# Patient Record
Sex: FEMALE | Race: WHITE | NOT HISPANIC OR LATINO | Employment: OTHER | URBAN - METROPOLITAN AREA
[De-identification: names, ages, dates, MRNs, and addresses within clinical notes are randomized per-mention and may not be internally consistent; named-entity substitution may affect disease eponyms.]

---

## 2019-04-10 ENCOUNTER — TELEPHONE (OUTPATIENT)
Dept: SCHEDULING | Facility: CLINIC | Age: 74
End: 2019-04-10

## 2019-04-17 ENCOUNTER — TELEPHONE (OUTPATIENT)
Dept: CARDIOLOGY | Facility: CLINIC | Age: 74
End: 2019-04-17

## 2019-04-17 NOTE — TELEPHONE ENCOUNTER
Left message on both phone numbers to change time for appt on Friday 4/19 from 1pm to 8:30am per . Thank you

## 2019-04-19 ENCOUNTER — OFFICE VISIT (OUTPATIENT)
Dept: CARDIOLOGY | Facility: CLINIC | Age: 74
End: 2019-04-19
Payer: MEDICARE

## 2019-04-19 ENCOUNTER — TELEPHONE (OUTPATIENT)
Dept: SCHEDULING | Facility: CLINIC | Age: 74
End: 2019-04-19

## 2019-04-19 ENCOUNTER — TELEPHONE (OUTPATIENT)
Dept: TRANSFER UNIT | Age: 74
End: 2019-04-19

## 2019-04-19 VITALS
HEIGHT: 67 IN | RESPIRATION RATE: 15 BRPM | SYSTOLIC BLOOD PRESSURE: 130 MMHG | BODY MASS INDEX: 24.48 KG/M2 | DIASTOLIC BLOOD PRESSURE: 80 MMHG | WEIGHT: 156 LBS | HEART RATE: 88 BPM

## 2019-04-19 DIAGNOSIS — E78.00 HYPERCHOLESTEROLEMIA: ICD-10-CM

## 2019-04-19 DIAGNOSIS — R07.89 CHEST TIGHTNESS: Primary | ICD-10-CM

## 2019-04-19 DIAGNOSIS — I73.00 RAYNAUD'S DISEASE WITHOUT GANGRENE: ICD-10-CM

## 2019-04-19 DIAGNOSIS — R03.0 ELEVATED BLOOD PRESSURE READING WITHOUT DIAGNOSIS OF HYPERTENSION: ICD-10-CM

## 2019-04-19 DIAGNOSIS — Z96.641 HISTORY OF HIP REPLACEMENT, TOTAL, RIGHT: ICD-10-CM

## 2019-04-19 DIAGNOSIS — Z86.79 HISTORY OF RHEUMATIC FEVER AS A CHILD: ICD-10-CM

## 2019-04-19 DIAGNOSIS — I71.40 ABDOMINAL AORTIC ANEURYSM (AAA) WITHOUT RUPTURE (CMS/HCC): ICD-10-CM

## 2019-04-19 PROBLEM — H40.9 GLAUCOMA: Status: ACTIVE | Noted: 2019-04-19

## 2019-04-19 PROBLEM — K21.9 GERD (GASTROESOPHAGEAL REFLUX DISEASE): Status: ACTIVE | Noted: 2019-04-19

## 2019-04-19 PROBLEM — G90.50 RSD (REFLEX SYMPATHETIC DYSTROPHY): Status: ACTIVE | Noted: 2019-04-19

## 2019-04-19 PROBLEM — M15.9 OSTEOARTHRITIS OF MULTIPLE JOINTS: Status: ACTIVE | Noted: 2019-04-19

## 2019-04-19 PROBLEM — M48.062 PSEUDOCLAUDICATION SYNDROME: Status: ACTIVE | Noted: 2019-04-19

## 2019-04-19 PROBLEM — E78.5 HYPERLIPIDEMIA: Status: ACTIVE | Noted: 2019-04-19

## 2019-04-19 PROBLEM — I00 RHEUMATIC FEVER: Status: ACTIVE | Noted: 2019-04-19

## 2019-04-19 PROCEDURE — 93000 ELECTROCARDIOGRAM COMPLETE: CPT | Performed by: INTERNAL MEDICINE

## 2019-04-19 PROCEDURE — 99205 OFFICE O/P NEW HI 60 MIN: CPT | Performed by: INTERNAL MEDICINE

## 2019-04-19 RX ORDER — PANTOPRAZOLE SODIUM 40 MG/1
40 TABLET, DELAYED RELEASE ORAL DAILY
COMMUNITY
End: 2023-07-14 | Stop reason: HOSPADM

## 2019-04-19 RX ORDER — NAPROXEN SODIUM 220 MG
440 TABLET ORAL DAILY PRN
COMMUNITY
End: 2019-05-03 | Stop reason: HOSPADM

## 2019-04-19 RX ORDER — AMOXICILLIN 500 MG/1
2000 TABLET, FILM COATED ORAL
Status: ON HOLD | COMMUNITY
Start: 2017-10-16 | End: 2019-05-03 | Stop reason: ENTERED-IN-ERROR

## 2019-04-19 RX ORDER — NITROGLYCERIN 0.4 MG/1
0.4 TABLET SUBLINGUAL EVERY 5 MIN PRN
Qty: 25 TABLET | Refills: 4 | Status: SHIPPED | OUTPATIENT
Start: 2019-04-19 | End: 2023-07-14 | Stop reason: HOSPADM

## 2019-04-19 RX ORDER — METOPROLOL SUCCINATE 50 MG/1
50 TABLET, EXTENDED RELEASE ORAL DAILY
Qty: 90 TABLET | Refills: 3 | Status: ON HOLD | OUTPATIENT
Start: 2019-04-19 | End: 2019-05-03

## 2019-04-19 RX ORDER — ROSUVASTATIN CALCIUM 5 MG/1
5 TABLET, COATED ORAL DAILY
Qty: 90 TABLET | Refills: 4 | Status: ON HOLD | OUTPATIENT
Start: 2019-04-19 | End: 2019-05-03

## 2019-04-19 RX ORDER — ASPIRIN 81 MG/1
81 TABLET ORAL DAILY
Qty: 90 TABLET | Refills: 5 | COMMUNITY
Start: 2019-04-19 | End: 2020-07-10 | Stop reason: ALTCHOICE

## 2019-04-19 ASSESSMENT — ENCOUNTER SYMPTOMS
ADENOPATHY: 1
NUMBNESS: 1
EYE DISCHARGE: 0
NERVOUS/ANXIOUS: 1
EYE PAIN: 0
FLANK PAIN: 0
FEVER: 0
NECK PAIN: 1
BACK PAIN: 1
COUGH: 1
POLYPHAGIA: 0
FACIAL ASYMMETRY: 0
ARTHRALGIAS: 1
FATIGUE: 1
POLYDIPSIA: 0
WEAKNESS: 0
MYALGIAS: 1
APNEA: 0
TROUBLE SWALLOWING: 1
SPEECH DIFFICULTY: 0
LIGHT-HEADEDNESS: 0
DIZZINESS: 1
ABDOMINAL PAIN: 0
PALPITATIONS: 1
ABDOMINAL DISTENTION: 0
BLOOD IN STOOL: 0
SEIZURES: 1
CHILLS: 0
EYE ITCHING: 1
BRUISES/BLEEDS EASILY: 1
SLEEP DISTURBANCE: 1
DIFFICULTY URINATING: 0
WOUND: 0
SHORTNESS OF BREATH: 1
COLOR CHANGE: 0
ANAL BLEEDING: 0
HEMATURIA: 0
WHEEZING: 0

## 2019-04-19 NOTE — LETTER
Tomasz Courtney MD, [facility]    Director, [facility]  [facility]    The [employer] Chair in Innovative Cardiology    Clinical Associate Professor of Medicine  [facility]  [facility]  [facility] [address]  [address]  [place] [address]    TEL  [phone]  FAX: [phone]  EMAIL: [email or web address]         4/19/2019    Anitra Way MD  [address] [place] [address]      Jennifer Sams  1945    Dear Dr. Way,      I saw Jennifer Sams in my office today on 4/19/2019 to provide an opinion as to the assessment and management of exertional chest tightness and shortness of breath.      She is a 74 y.o. woman with a history of osteoarthritis of her hips status post multiple surgical procedures echo experiencing intractable hip pain and a history of hypercholesterolemia with a high HDL cholesterol and multiple drug intolerances.    A few months ago she began to notice the development of intermittent tightness across her upper chest and shortness of breath when climbing stairs, which is currently her most demanding physical activity.  The discomfort shortness of breath improved with resting for a few minutes.  At times she is capable of climbing of stairs without symptoms and then other times with just shortness of breath.  The symptoms have not increased in frequency or intensity since they began.  They are not associated with nausea, vomiting, diaphoresis and do not radiate.    Her most recent lipid profile (3/9/19) revealed the following:  Cholesterol 258, triglyceride 127, HDL 76, , non-.  She is taking red yeast rice but is not on a prescribed statin due to concerns about side effects.  She had developed GI side effects from atorvastatin and other statins but does not recall having been placed on rosuvastatin  at any time.  Her side effects are not associated with myalgias or muscle cramping but rather GI in nature.    She was found to have a mildly dilated abdominal aorta (2.4 x 2.5 cm) noted incidentally when MRI studies were performed to reevaluate her hips.    She is in chronic pain due to her hips which impairs her ability to sleep at night.  She reports being chronically fatigued.    There is no history of stroke or claudication.  She reports a childhood history of rheumatic fever but has never been told that there has been a significant heart murmur.    Social History   Substance Use Topics   • Smoking status: Never Smoker   • Smokeless tobacco: Never Used   • Alcohol use Yes      Comment: VERY RARE        Past Medical History:   Diagnosis Date   • AAA (abdominal aortic aneurysm) (CMS/HCC) ([facility])    • Arthritis    • Elevated blood pressure reading without diagnosis of hypertension 4/19/2019   • GERD (gastroesophageal reflux disease) 4/19/2019   • Glaucoma 4/19/2019   • Heart murmur    • Hiatal hernia    • History of hip replacement, total, right 4/19/2019    Right hip 9/ 2016 and revision 2017    • History of rheumatic fever as a child 4/19/2019   • Hypercholesterolemia 4/19/2019   • Osteoarthritis of multiple joints 4/19/2019   • Osteoporosis    • Raynaud's disease 4/19/2019   • RSD (reflex sympathetic dystrophy) 4/19/2019    Of left foot       Past Surgical History:   Procedure Laterality Date   • CHOLECYSTECTOMY OPEN     • DILATION AND CURETTAGE OF UTERUS     • EYE SURGERY      RIGHT CATARACT   • FOOT SURGERY Left    • JOINT REPLACEMENT Right 09/2016    total hip   • REVISION TOTAL HIP ARTHROPLASTY Right 2017   • TONSILLECTOMY            Family History   Problem Relation Age of Onset   • Congenital heart disease Mother         noted at autopsy   • Heart attack Paternal Grandfather        ALLERGIES:  Allergies   Allergen Reactions   • Atorvastatin      Nausea and can'trecall   • Codeine      Nausea and Vomiting    • Dilaudid [Hydromorphone]      Nausea & vomiting   • Oxycodone      Nausea & vomiting, dizziness   • Trichophyton Mentagrophytes Allergenic Extract      Other reaction(s): Sneezing    • Morphine Sulfate Nausea Only and GI intolerance         Current Outpatient Prescriptions:   •  amoxicillin (AMOXIL) 500 mg tablet, Take 2,000 mg by mouth. TAKE 4 CAPSULES BY MOUTH 1 HOUR PRIOR TO DENTAL PROCEDURE, Disp: , Rfl:   •  cetirizine HCl/pseudoephedrine (ALLERGY COMPLETE-D ORAL), Take 2 tablets by mouth daily., Disp: , Rfl:   •  multivitamin tablet, Take 1 tablet by mouth daily., Disp: , Rfl:   •  naproxen sodium (ALEVE) 220 mg tablet, Take 440 mg by mouth daily as needed for moderate pain.  , Disp: , Rfl:   •  pantoprazole (PROTONIX) 40 mg EC tablet, Take 40 mg by mouth daily., Disp: , Rfl:   •  RED YEAST RICE ORAL, Take 600 mg by mouth daily., Disp: , Rfl:     Review of Systems   Constitutional: Positive for fatigue (exhausted from lackof sleep with pain). Negative for chills and fever.        Weight increased 30 lbs over past 2  years   HENT: Positive for nosebleeds (with dry air - severe), postnasal drip, tinnitus (occasional) and trouble swallowing (only with beef- gets stuck). Negative for hearing loss.    Eyes: Positive for itching (with allergies). Negative for pain and discharge.        Uses glasses   Respiratory: Positive for cough (chronic, with allergies) and shortness of breath (accompanis chest discomfort - lasts longer than chest discomfort and resolves within minutes). Negative for apnea and wheezing.         Occasional snoring    Cardiovascular: Positive for chest pain (occ tightness iin center of chest with exertion such as steps, resolves with rest, no radiation but accompanied by SOB -started in last few months-), palpitations (occasional,fluttering and sometimes pounding/racing with exertion - lasts nomore than a minute) and leg swelling (swerlling as day progresses, resolves overnight).        No  "orthopnea or PND   Gastrointestinal: Negative for abdominal distention, abdominal pain, anal bleeding and blood in stool.   Endocrine: Negative for cold intolerance, heat intolerance, polydipsia, polyphagia and polyuria.   Genitourinary: Negative for difficulty urinating, flank pain and hematuria.        Nocturia X 0-1   Musculoskeletal: Positive for arthralgias (right hippain- severe with radiation down leg, used to, improve with Aleve but not now - severe --alsomultiple other joints), back pain (across lower back), myalgias (calves and instep,wrsening lately, can awaken her from sleep) and neck pain (occasional).   Skin: Negative for color change, pallor, rash and wound.   Allergic/Immunologic: Positive for environmental allergies and food allergies (with onions). Negative for immunocompromised state.   Neurological: Positive for dizziness (chronic, will just occur suddenly, can even occur seated), seizures (as a child) and numbness (right knee to ankleforpast 2 weeks). Negative for syncope, facial asymmetry, speech difficulty, weakness and light-headedness.   Hematological: Positive for adenopathy (occasionally in neck). Bruises/bleeds easily.   Psychiatric/Behavioral: Positive for sleep disturbance (from severe hippain). Negative for self-injury and suicidal ideas. The patient is nervous/anxious.        PHYSICAL EXAM  Vital Signs: BP  142/88 initially then 130/80  Pulse 88   Resp 15   Ht 1.702 m (5' 7\")   Wt 70.8 kg (156 lb)   BMI 24.43 kg/m²    General: well-developed, well-nourished, appears well, no acute distress  HEENT: sclera anicteric, conjunctiva pink, no xanthelasma, neck supple, no thyromegaly  Chest: clear to auscultation, symmetrical expansion, no scoliosis   Heart: Apical impulse normally situated, regular rhythm, normal S1, normal S2, no gallops, no murmur  JVP: normal jugular venous pressure, negative AJR  Vascular: carotid pulses: intact bilaterally, no bruit, peripheral pulses: intact " bilaterally  Abd: soft, non-tender, no hepatosplenomegaly, abdominal aorta not palpable  Ext: no edema, no clubbing, no cyanosis  Skin: warm, dry, no ecchymoses  Neuro: alert, oriented x 3, normal muscle strength  Psychiatric: normal affect, normal judgment, normal insight and normal memory      LABS:  Laboratory studies obtained on 3/9/19 revealed:  Cholesterol 258, triglyceride 127, HDL 76, , non-, comprehensive metabolic profile normal including BUN/creatinine = 18/0.81.   .    ECG:  The electrocardiogram obtained today 4/19/2019 revealed Sinus  Rhythm at a rate of 88.  Within normal limits.  When compared with the most recent ECG of 3/9/19 there was no significant change.      IMPRESSION & PLAN:  Exertional chest tightness and shortness of breath:  The symptoms are certainly consistent with angina pectoris.  She is not capable of exercising on a treadmill and I am not pleased with pharmacologic nuclear imaging studies to accurately define her ischemic burden.  I therefore have ordered a coronary CTA with Heart flow to assess her coronary arteries.  I initiated aspirin 81 mg daily, and began metoprolol XL 50 mg daily.  I provided her with a prescription for sublingual nitroglycerin.  Education was also provided about the symptoms that might be associated with the development of an acute coronary syndrome and the necessity of immediate medical attention should that occur.    Hypercholesterolemia:  Although her HDL cholesterol is high it does not exclude the presence of atherosclerotic CAD especially in the setting of atherosclerosis of her abdominal aorta.  I initiated rosuvastatin 5 mg a day and told her to discontinue red yeast rice.    History of childhood rheumatic fever:  No evidence on her examination to support aortic or mitral valve disease.  Does not require an echocardiogram unless coronary CTA does not reveal obstructive CAD.    The aforementioned changes were made in her regimen today.      Listed below you will find the regimen that she will be requested to continue:      Current Outpatient Prescriptions:   •  amoxicillin (AMOXIL) 500 mg tablet, Take 2,000 mg by mouth. TAKE 4 CAPSULES BY MOUTH 1 HOUR PRIOR TO DENTAL PROCEDURE, Disp: , Rfl:   •  cetirizine HCl/pseudoephedrine (ALLERGY COMPLETE-D ORAL), Take 2 tablets by mouth daily., Disp: , Rfl:   •  multivitamin tablet, Take 1 tablet by mouth daily., Disp: , Rfl:   •  naproxen sodium (ALEVE) 220 mg tablet, Take 440 mg by mouth daily as needed for moderate pain.  , Disp: , Rfl:   •  pantoprazole (PROTONIX) 40 mg EC tablet, Take 40 mg by mouth daily., Disp: , Rfl:   •  aspirin (ASPIR-81) 81 mg enteric coated tablet, Take 1 tablet (81 mg total) by mouth daily., Disp: 90 tablet, Rfl: 5  •  metoprolol succinate XL (TOPROL-XL) 50 mg 24 hr tablet, Take 1 tablet (50 mg total) by mouth daily., Disp: 90 tablet, Rfl: 3  •  nitroglycerin (NITROSTAT) 0.4 mg SL tablet, Place 1 tablet (0.4 mg total) under the tongue every 5 (five) minutes as needed for chest pain., Disp: 25 tablet, Rfl: 4  •  rosuvastatin (CRESTOR) 5 mg tablet, Take 1 tablet (5 mg total) by mouth daily., Disp: 90 tablet, Rfl: 4      If there are no new interval cardiovascular problems, I will see her again in 2 weeks to assess her response to therapy and review her studies with her.    I sincerely appreciate the opportunity to participate in the care of your patients. Please do not hesitate to contact me if any questions or problems arise.     With best wishes and warmest personal regards.      Sincerely,      Tomasz Courtney MD, [facility]    This document was generated utilizing voice recognition technology. A reasonable attempt at proofreading has been made to minimize errors but please excuse any typographical errors which may be present. Please call with any questions.

## 2019-04-19 NOTE — PROGRESS NOTES
Subjective      Patient ID: Jennifer Sams is a 74 y.o. female.  1945      HPI    The following have been reviewed and updated as appropriate in this visit:  Tobacco  Allergies  Meds  Problems  Med Hx  Surg Hx  Fam Hx  Soc Hx        Review of Systems   Constitutional: Positive for fatigue (exhausted from lackof sleep with pain). Negative for chills and fever.        Weight increased 30 lbs over past 2  years   HENT: Positive for nosebleeds (with dry air - severe), postnasal drip, tinnitus (occasional) and trouble swallowing (only with beef- gets stuck). Negative for hearing loss.    Eyes: Positive for itching (with allergies). Negative for pain and discharge.        Uses glasses   Respiratory: Positive for cough (chronic, with allergies) and shortness of breath (accompanis chest discomfort - lasts longer than chest discomfort and resolves within minutes). Negative for apnea and wheezing.         Occasional snoring    Cardiovascular: Positive for chest pain (occ tightness iin center of chest with exertion such as steps, resolves with rest, no radiation but accompanied by SOB -started in last few months-), palpitations (occasional,fluttering and sometimes pounding/racing with exertion - lasts nomore than a minute) and leg swelling (swerlling as day progresses, resolves overnight).        No orthopnea or PND   Gastrointestinal: Negative for abdominal distention, abdominal pain, anal bleeding and blood in stool.   Endocrine: Negative for cold intolerance, heat intolerance, polydipsia, polyphagia and polyuria.   Genitourinary: Negative for difficulty urinating, flank pain and hematuria.        Nocturia X 0-1   Musculoskeletal: Positive for arthralgias (right hippain- severe with radiation down leg, used to, improve with Aleve but not now - severe --alsomultiple other joints), back pain (across lower back), myalgias (calves and instep,wrsening lately, can awaken her from sleep) and neck pain (occasional).  "  Skin: Negative for color change, pallor, rash and wound.   Allergic/Immunologic: Positive for environmental allergies and food allergies (with onions). Negative for immunocompromised state.   Neurological: Positive for dizziness (chronic, will just occur suddenly, can even occur seated), seizures (as a child) and numbness (right knee to ankleforpast 2 weeks). Negative for syncope, facial asymmetry, speech difficulty, weakness and light-headedness.   Hematological: Positive for adenopathy (occasionally in neck). Bruises/bleeds easily.   Psychiatric/Behavioral: Positive for sleep disturbance (from severe hippain). Negative for self-injury and suicidal ideas. The patient is nervous/anxious.        Objective     Vitals:    04/19/19 0813   Pulse: 88   Resp: 15   Weight: 70.8 kg (156 lb)   Height: 1.702 m (5' 7\")     Body mass index is 24.43 kg/m².    Physical Exam    Assessment/Plan   Diagnoses and all orders for this visit:    Chest tightness (Primary)  -     ECG 12 lead        "

## 2019-04-19 NOTE — PROGRESS NOTES
Tomasz Courtney MD, [facility]    Director, Clinical Cardiology-[facility] and  [facility]    The N[employer] in Innovative Cardiology    Clinical Associate Professor of Medicine  [facility]  [facility]  [facility]  [facility] [address]  [address]  [place] [address]    TEL  [phone]  FAX: [phone]  EMAIL: [email or web address]         4/19/2019    Anitra Way MD  [address] [place] [address]      Jennifer Sams  1945    Dear Dr. Way,      I saw Jennifer Sams in my office today on 4/19/2019 to provide an opinion as to the assessment and management of exertional chest tightness and shortness of breath.      She is a 74 y.o. woman with a history of osteoarthritis of her hips status post multiple surgical procedures echo experiencing intractable hip pain and a history of hypercholesterolemia with a high HDL cholesterol and multiple drug intolerances.    A few months ago she began to notice the development of intermittent tightness across her upper chest and shortness of breath when climbing stairs, which is currently her most demanding physical activity.  The discomfort shortness of breath improved with resting for a few minutes.  At times she is capable of climbing of stairs without symptoms and then other times with just shortness of breath.  The symptoms have not increased in frequency or intensity since they began.  They are not associated with nausea, vomiting, diaphoresis and do not radiate.    Her most recent lipid profile (3/9/19) revealed the following:  Cholesterol 258, triglyceride 127, HDL 76, , non-.  She is taking red yeast rice but is not on a prescribed statin due to concerns about side effects.  She had developed GI side effects from atorvastatin and other statins but does not recall having been placed on rosuvastatin  at any time.  Her side effects are not associated with myalgias or muscle cramping but rather GI in nature.    She was found to have a mildly dilated abdominal aorta (2.4 x 2.5 cm) noted incidentally when MRI studies were performed to reevaluate her hips.    She is in chronic pain due to her hips which impairs her ability to sleep at night.  She reports being chronically fatigued.    There is no history of stroke or claudication.  She reports a childhood history of rheumatic fever but has never been told that there has been a significant heart murmur.    Social History   Substance Use Topics   • Smoking status: Never Smoker   • Smokeless tobacco: Never Used   • Alcohol use Yes      Comment: VERY RARE        Past Medical History:   Diagnosis Date   • AAA (abdominal aortic aneurysm) (CMS/HCC) ([facility])    • Arthritis    • Elevated blood pressure reading without diagnosis of hypertension 4/19/2019   • GERD (gastroesophageal reflux disease) 4/19/2019   • Glaucoma 4/19/2019   • Heart murmur    • Hiatal hernia    • History of hip replacement, total, right 4/19/2019    Right hip 9/ 2016 and revision 2017    • History of rheumatic fever as a child 4/19/2019   • Hypercholesterolemia 4/19/2019   • Osteoarthritis of multiple joints 4/19/2019   • Osteoporosis    • Raynaud's disease 4/19/2019   • RSD (reflex sympathetic dystrophy) 4/19/2019    Of left foot       Past Surgical History:   Procedure Laterality Date   • CHOLECYSTECTOMY OPEN     • DILATION AND CURETTAGE OF UTERUS     • EYE SURGERY      RIGHT CATARACT   • FOOT SURGERY Left    • JOINT REPLACEMENT Right 09/2016    total hip   • REVISION TOTAL HIP ARTHROPLASTY Right 2017   • TONSILLECTOMY            Family History   Problem Relation Age of Onset   • Congenital heart disease Mother         noted at autopsy   • Heart attack Paternal Grandfather        ALLERGIES:  Allergies   Allergen Reactions   • Atorvastatin      Nausea and can'trecall   • Codeine      Nausea and Vomiting    • Dilaudid [Hydromorphone]      Nausea & vomiting   • Oxycodone      Nausea & vomiting, dizziness   • Trichophyton Mentagrophytes Allergenic Extract      Other reaction(s): Sneezing    • Morphine Sulfate Nausea Only and GI intolerance         Current Outpatient Prescriptions:   •  amoxicillin (AMOXIL) 500 mg tablet, Take 2,000 mg by mouth. TAKE 4 CAPSULES BY MOUTH 1 HOUR PRIOR TO DENTAL PROCEDURE, Disp: , Rfl:   •  cetirizine HCl/pseudoephedrine (ALLERGY COMPLETE-D ORAL), Take 2 tablets by mouth daily., Disp: , Rfl:   •  multivitamin tablet, Take 1 tablet by mouth daily., Disp: , Rfl:   •  naproxen sodium (ALEVE) 220 mg tablet, Take 440 mg by mouth daily as needed for moderate pain.  , Disp: , Rfl:   •  pantoprazole (PROTONIX) 40 mg EC tablet, Take 40 mg by mouth daily., Disp: , Rfl:   •  RED YEAST RICE ORAL, Take 600 mg by mouth daily., Disp: , Rfl:     Review of Systems   Constitutional: Positive for fatigue (exhausted from lackof sleep with pain). Negative for chills and fever.        Weight increased 30 lbs over past 2  years   HENT: Positive for nosebleeds (with dry air - severe), postnasal drip, tinnitus (occasional) and trouble swallowing (only with beef- gets stuck). Negative for hearing loss.    Eyes: Positive for itching (with allergies). Negative for pain and discharge.        Uses glasses   Respiratory: Positive for cough (chronic, with allergies) and shortness of breath (accompanis chest discomfort - lasts longer than chest discomfort and resolves within minutes). Negative for apnea and wheezing.         Occasional snoring    Cardiovascular: Positive for chest pain (occ tightness iin center of chest with exertion such as steps, resolves with rest, no radiation but accompanied by SOB -started in last few months-), palpitations (occasional,fluttering and sometimes pounding/racing with exertion - lasts nomore than a minute) and leg swelling (swerlling as day progresses, resolves overnight).        No  "orthopnea or PND   Gastrointestinal: Negative for abdominal distention, abdominal pain, anal bleeding and blood in stool.   Endocrine: Negative for cold intolerance, heat intolerance, polydipsia, polyphagia and polyuria.   Genitourinary: Negative for difficulty urinating, flank pain and hematuria.        Nocturia X 0-1   Musculoskeletal: Positive for arthralgias (right hippain- severe with radiation down leg, used to, improve with Aleve but not now - severe --alsomultiple other joints), back pain (across lower back), myalgias (calves and instep,wrsening lately, can awaken her from sleep) and neck pain (occasional).   Skin: Negative for color change, pallor, rash and wound.   Allergic/Immunologic: Positive for environmental allergies and food allergies (with onions). Negative for immunocompromised state.   Neurological: Positive for dizziness (chronic, will just occur suddenly, can even occur seated), seizures (as a child) and numbness (right knee to ankleforpast 2 weeks). Negative for syncope, facial asymmetry, speech difficulty, weakness and light-headedness.   Hematological: Positive for adenopathy (occasionally in neck). Bruises/bleeds easily.   Psychiatric/Behavioral: Positive for sleep disturbance (from severe hippain). Negative for self-injury and suicidal ideas. The patient is nervous/anxious.        PHYSICAL EXAM  Vital Signs: BP  142/88 initially then 130/80  Pulse 88   Resp 15   Ht 1.702 m (5' 7\")   Wt 70.8 kg (156 lb)   BMI 24.43 kg/m²    General: well-developed, well-nourished, appears well, no acute distress  HEENT: sclera anicteric, conjunctiva pink, no xanthelasma, neck supple, no thyromegaly  Chest: clear to auscultation, symmetrical expansion, no scoliosis   Heart: Apical impulse normally situated, regular rhythm, normal S1, normal S2, no gallops, no murmur  JVP: normal jugular venous pressure, negative AJR  Vascular: carotid pulses: intact bilaterally, no bruit, peripheral pulses: intact " bilaterally  Abd: soft, non-tender, no hepatosplenomegaly, abdominal aorta not palpable  Ext: no edema, no clubbing, no cyanosis  Skin: warm, dry, no ecchymoses  Neuro: alert, oriented x 3, normal muscle strength  Psychiatric: normal affect, normal judgment, normal insight and normal memory      LABS:  Laboratory studies obtained on 3/9/19 revealed:  Cholesterol 258, triglyceride 127, HDL 76, , non-, comprehensive metabolic profile normal including BUN/creatinine = 18/0.81.   .    ECG:  The electrocardiogram obtained today 4/19/2019 revealed Sinus  Rhythm at a rate of 88.  Within normal limits.  When compared with the most recent ECG of 3/9/19 there was no significant change.      IMPRESSION & PLAN:  Exertional chest tightness and shortness of breath:  The symptoms are certainly consistent with angina pectoris.  She is not capable of exercising on a treadmill and I am not pleased with pharmacologic nuclear imaging studies to accurately define her ischemic burden.  I therefore have ordered a coronary CTA with Heart flow to assess her coronary arteries.  I initiated aspirin 81 mg daily, and began metoprolol XL 50 mg daily.  I provided her with a prescription for sublingual nitroglycerin.  Education was also provided about the symptoms that might be associated with the development of an acute coronary syndrome and the necessity of immediate medical attention should that occur.    Hypercholesterolemia:  Although her HDL cholesterol is high it does not exclude the presence of atherosclerotic CAD especially in the setting of atherosclerosis of her abdominal aorta.  I initiated rosuvastatin 5 mg a day and told her to discontinue red yeast rice.    History of childhood rheumatic fever:  No evidence on her examination to support aortic or mitral valve disease.  Does not require an echocardiogram unless coronary CTA does not reveal obstructive CAD.    The aforementioned changes were made in her regimen today.      Listed below you will find the regimen that she will be requested to continue:      Current Outpatient Prescriptions:   •  amoxicillin (AMOXIL) 500 mg tablet, Take 2,000 mg by mouth. TAKE 4 CAPSULES BY MOUTH 1 HOUR PRIOR TO DENTAL PROCEDURE, Disp: , Rfl:   •  cetirizine HCl/pseudoephedrine (ALLERGY COMPLETE-D ORAL), Take 2 tablets by mouth daily., Disp: , Rfl:   •  multivitamin tablet, Take 1 tablet by mouth daily., Disp: , Rfl:   •  naproxen sodium (ALEVE) 220 mg tablet, Take 440 mg by mouth daily as needed for moderate pain.  , Disp: , Rfl:   •  pantoprazole (PROTONIX) 40 mg EC tablet, Take 40 mg by mouth daily., Disp: , Rfl:   •  aspirin (ASPIR-81) 81 mg enteric coated tablet, Take 1 tablet (81 mg total) by mouth daily., Disp: 90 tablet, Rfl: 5  •  metoprolol succinate XL (TOPROL-XL) 50 mg 24 hr tablet, Take 1 tablet (50 mg total) by mouth daily., Disp: 90 tablet, Rfl: 3  •  nitroglycerin (NITROSTAT) 0.4 mg SL tablet, Place 1 tablet (0.4 mg total) under the tongue every 5 (five) minutes as needed for chest pain., Disp: 25 tablet, Rfl: 4  •  rosuvastatin (CRESTOR) 5 mg tablet, Take 1 tablet (5 mg total) by mouth daily., Disp: 90 tablet, Rfl: 4      If there are no new interval cardiovascular problems, I will see her again in 2 weeks to assess her response to therapy and review her studies with her.    I sincerely appreciate the opportunity to participate in the care of your patients. Please do not hesitate to contact me if any questions or problems arise.     With best wishes and warmest personal regards.      Sincerely,      Tomasz Courtney MD, [facility]    This document was generated utilizing voice recognition technology. A reasonable attempt at proofreading has been made to minimize errors but please excuse any typographical errors which may be present. Please call with any questions.

## 2019-04-22 ENCOUNTER — TELEPHONE (OUTPATIENT)
Dept: SCHEDULING | Facility: CLINIC | Age: 74
End: 2019-04-22

## 2019-04-22 DIAGNOSIS — E78.5 HYPERLIPIDEMIA, UNSPECIFIED HYPERLIPIDEMIA TYPE: Primary | ICD-10-CM

## 2019-04-22 NOTE — TELEPHONE ENCOUNTER
Trina is calling back, she is still awaiting the lab work  Once ordered please route to [phone]

## 2019-04-22 NOTE — TELEPHONE ENCOUNTER
Pt scheduled for CTA on 4/19. 3/9 labs not sufficient for visit. Pt needs updated labs. Please contact Trina at [facility] at Ph:[phone], Fax: [phone]. Med will contact pt with once she speaks with us.

## 2019-04-26 ENCOUNTER — APPOINTMENT (EMERGENCY)
Dept: RADIOLOGY | Facility: HOSPITAL | Age: 74
DRG: 246 | End: 2019-04-26
Payer: MEDICARE

## 2019-04-26 ENCOUNTER — HOSPITAL ENCOUNTER (INPATIENT)
Facility: HOSPITAL | Age: 74
LOS: 7 days | Discharge: HOME | DRG: 246 | End: 2019-05-03
Attending: EMERGENCY MEDICINE | Admitting: HOSPITALIST
Payer: MEDICARE

## 2019-04-26 DIAGNOSIS — I21.4 NSTEMI (NON-ST ELEVATED MYOCARDIAL INFARCTION) (CMS/HCC): Primary | ICD-10-CM

## 2019-04-26 LAB
ALBUMIN SERPL-MCNC: 4.1 G/DL (ref 3.4–5)
ALP SERPL-CCNC: 79 IU/L (ref 35–126)
ALT SERPL-CCNC: 17 IU/L (ref 11–54)
ANION GAP SERPL CALC-SCNC: 10 MEQ/L (ref 3–15)
APTT PPP: 30 SEC (ref 23–35)
AST SERPL-CCNC: 24 IU/L (ref 15–41)
BASOPHILS # BLD: 0.05 K/UL (ref 0.01–0.1)
BASOPHILS NFR BLD: 0.8 %
BILIRUB SERPL-MCNC: 0.5 MG/DL (ref 0.3–1.2)
BUN SERPL-MCNC: 14 MG/DL (ref 8–20)
BUN SERPL-MCNC: 18 MG/DL (ref 7–25)
CALCIUM SERPL-MCNC: 9.4 MG/DL (ref 8.9–10.3)
CHLORIDE SERPL-SCNC: 105 MEQ/L (ref 98–109)
CO2 SERPL-SCNC: 23 MEQ/L (ref 22–32)
CREAT SERPL-MCNC: 0.75 MG/DL (ref 0.6–0.93)
CREAT SERPL-MCNC: 0.8 MG/DL
DIFFERENTIAL METHOD BLD: NORMAL
EOSINOPHIL # BLD: 0.09 K/UL (ref 0.04–0.36)
EOSINOPHIL NFR BLD: 1.5 %
ERYTHROCYTE [DISTWIDTH] IN BLOOD BY AUTOMATED COUNT: 13 % (ref 11.7–14.4)
GFR SERPL CREATININE-BSD FRML MDRD: >60 ML/MIN/1.73M*2
GLUCOSE SERPL-MCNC: 108 MG/DL (ref 70–99)
HCT VFR BLDCO AUTO: 43 %
HGB BLD-MCNC: 14.5 G/DL
IMM GRANULOCYTES # BLD AUTO: 0.03 K/UL (ref 0–0.08)
IMM GRANULOCYTES NFR BLD AUTO: 0.5 %
INR PPP: 1 INR
LYMPHOCYTES # BLD: 1.71 K/UL (ref 1.2–3.5)
LYMPHOCYTES NFR BLD: 27.8 %
MCH RBC QN AUTO: 30 PG (ref 28–33.2)
MCHC RBC AUTO-ENTMCNC: 33.7 G/DL (ref 32.2–35.5)
MCV RBC AUTO: 88.8 FL (ref 83–98)
MONOCYTES # BLD: 0.53 K/UL (ref 0.28–0.8)
MONOCYTES NFR BLD: 8.6 %
NEUTROPHILS # BLD: 3.74 K/UL (ref 1.7–7)
NEUTS SEG NFR BLD: 60.8 %
NRBC BLD-RTO: 0 %
PDW BLD AUTO: 8.9 FL (ref 9.4–12.3)
PLATELET # BLD AUTO: 223 K/UL
POTASSIUM SERPL-SCNC: 3.7 MEQ/L (ref 3.6–5.1)
PROT SERPL-MCNC: 6.8 G/DL (ref 6–8.2)
PROTHROMBIN TIME: 13.2 SEC (ref 12.2–14.5)
QUEST EGFR NON-AFR. AMERICAN: 79 ML/MIN/1.73M2
RBC # BLD AUTO: 4.84 M/UL (ref 3.93–5.22)
SODIUM SERPL-SCNC: 138 MEQ/L (ref 136–144)
TROPONIN I SERPL-MCNC: 0.12 NG/ML
WBC # BLD AUTO: 6.15 K/UL

## 2019-04-26 PROCEDURE — 21400000 HC ROOM AND CARE CCU/INTERMEDIATE

## 2019-04-26 PROCEDURE — 84484 ASSAY OF TROPONIN QUANT: CPT | Performed by: EMERGENCY MEDICINE

## 2019-04-26 PROCEDURE — 71046 X-RAY EXAM CHEST 2 VIEWS: CPT

## 2019-04-26 PROCEDURE — 80053 COMPREHEN METABOLIC PANEL: CPT

## 2019-04-26 PROCEDURE — 84484 ASSAY OF TROPONIN QUANT: CPT

## 2019-04-26 PROCEDURE — 63600000 HC DRUGS/DETAIL CODE: Performed by: EMERGENCY MEDICINE

## 2019-04-26 PROCEDURE — 85025 COMPLETE CBC W/AUTO DIFF WBC: CPT | Performed by: EMERGENCY MEDICINE

## 2019-04-26 PROCEDURE — 80053 COMPREHEN METABOLIC PANEL: CPT | Performed by: EMERGENCY MEDICINE

## 2019-04-26 PROCEDURE — 99223 1ST HOSP IP/OBS HIGH 75: CPT | Performed by: HOSPITALIST

## 2019-04-26 PROCEDURE — 93005 ELECTROCARDIOGRAM TRACING: CPT | Performed by: EMERGENCY MEDICINE

## 2019-04-26 PROCEDURE — 85025 COMPLETE CBC W/AUTO DIFF WBC: CPT

## 2019-04-26 PROCEDURE — 99285 EMERGENCY DEPT VISIT HI MDM: CPT | Mod: 25

## 2019-04-26 PROCEDURE — 85610 PROTHROMBIN TIME: CPT | Performed by: EMERGENCY MEDICINE

## 2019-04-26 PROCEDURE — 36415 COLL VENOUS BLD VENIPUNCTURE: CPT

## 2019-04-26 PROCEDURE — 63700000 HC SELF-ADMINISTRABLE DRUG: Performed by: EMERGENCY MEDICINE

## 2019-04-26 PROCEDURE — 85730 THROMBOPLASTIN TIME PARTIAL: CPT | Performed by: EMERGENCY MEDICINE

## 2019-04-26 RX ORDER — HEPARIN SODIUM 10000 [USP'U]/100ML
0-4000 INJECTION, SOLUTION INTRAVENOUS
Status: DISCONTINUED | OUTPATIENT
Start: 2019-04-26 | End: 2019-04-30

## 2019-04-26 RX ORDER — NAPROXEN SODIUM 220 MG/1
243 TABLET, FILM COATED ORAL ONCE
Status: COMPLETED | OUTPATIENT
Start: 2019-04-26 | End: 2019-04-26

## 2019-04-26 RX ADMIN — ASPIRIN 243 MG: 81 TABLET, CHEWABLE ORAL at 23:00

## 2019-04-26 RX ADMIN — HEPARIN SODIUM 1000 UNITS/HR: 10000 INJECTION, SOLUTION INTRAVENOUS at 23:01

## 2019-04-26 ASSESSMENT — ENCOUNTER SYMPTOMS
BACK PAIN: 0
WEAKNESS: 0
ABDOMINAL PAIN: 0
TROUBLE SWALLOWING: 0
ALTERED MENTAL STATUS: 0
NEAR-SYNCOPE: 0
ANOREXIA: 0
FEVER: 0
NUMBNESS: 0
HEADACHES: 0
LOWER EXTREMITY EDEMA: 0
SHORTNESS OF BREATH: 1
COUGH: 0
AICD PROBLEM: 0
FATIGUE: 0
PALPITATIONS: 0
VOMITING: 0
NAUSEA: 0
HEARTBURN: 0

## 2019-04-26 NOTE — Clinical Note
The right coronary artery was selectively engaged, injected and visualized. Multiple views of the injected vessel were taken.

## 2019-04-26 NOTE — Clinical Note
Closure device placed for the left femoral artery. Closure device used: Perclose. Hemostasis achieved.

## 2019-04-26 NOTE — Clinical Note
The right groin and left radial was clipped, marked  and prepped with ChloraPrep. The patient was draped in a sterile fashion after allowing for the recommended dry time.

## 2019-04-26 NOTE — Clinical Note
The left coronary artery was selectively engaged, injected and visualized. Multiple views of the injected vessel were taken.

## 2019-04-26 NOTE — Clinical Note
Stent deployed in the distal left anterior descending. Pressure = 14 ana. Inflation time =  3 seconds.

## 2019-04-26 NOTE — Clinical Note
Closure device placed for the left radial artery. Closure device used: radial compression system. Hemostasis achieved.

## 2019-04-26 NOTE — Clinical Note
The right groin and left brachial was clipped  and prepped with ChloraPrep. The patient was draped in a sterile fashion after allowing for the recommended dry time.

## 2019-04-26 NOTE — Clinical Note
Stent deployed in the mid left anterior descending. Pressure = 12 ana. Inflation time =  3 seconds.

## 2019-04-26 NOTE — Clinical Note
Stent deployed in the distal left anterior descending. Pressure = 12 ana. Inflation time =  4 seconds. Resolute Jason 2.0 mm x 30 mm

## 2019-04-26 NOTE — Clinical Note
Patient placed on procedure table in supine position with left arm extended. Positioning devices: arm board under arms.

## 2019-04-27 ENCOUNTER — APPOINTMENT (INPATIENT)
Dept: CARDIOLOGY | Facility: HOSPITAL | Age: 74
DRG: 246 | End: 2019-04-27
Attending: STUDENT IN AN ORGANIZED HEALTH CARE EDUCATION/TRAINING PROGRAM
Payer: MEDICARE

## 2019-04-27 LAB
ANION GAP SERPL CALC-SCNC: 10 MEQ/L (ref 3–15)
ANION GAP SERPL CALC-SCNC: 10 MEQ/L (ref 3–15)
AORTIC ROOT ANNULUS: 3 CM
APTT PPP: 129 SEC (ref 23–35)
APTT PPP: 132 SEC (ref 23–35)
APTT PPP: 85 SEC (ref 23–35)
ASCENDING AORTA: 2.9 CM
ATRIAL RATE: 79
BSA FOR ECHO PROCEDURE: 1.81 M2
BUN SERPL-MCNC: 12 MG/DL (ref 8–20)
BUN SERPL-MCNC: 9 MG/DL (ref 8–20)
CALCIUM SERPL-MCNC: 8.5 MG/DL (ref 8.9–10.3)
CALCIUM SERPL-MCNC: 8.7 MG/DL (ref 8.9–10.3)
CHLORIDE SERPL-SCNC: 107 MEQ/L (ref 98–109)
CHLORIDE SERPL-SCNC: 108 MEQ/L (ref 98–109)
CHOLEST SERPL-MCNC: 239 MG/DL
CO2 SERPL-SCNC: 20 MEQ/L (ref 22–32)
CO2 SERPL-SCNC: 21 MEQ/L (ref 22–32)
CREAT SERPL-MCNC: 0.7 MG/DL
CREAT SERPL-MCNC: 0.7 MG/DL
CUSP SEPARATION: 2 CM
DOP CALC LVOT STROKE VOLUME: 56.08 ML
E WAVE DECELERATION TIME: 173 MS
E/A RATIO: 0.6
E/E' RATIO: 13.1
E/LAT E' RATIO: 9.9
EDV (BP): 48.9 CM3
EF (A4C): 45.2 %
EF A2C: 46.1 %
EJECTION FRACTION: 45.6 %
ERYTHROCYTE [DISTWIDTH] IN BLOOD BY AUTOMATED COUNT: 12.9 % (ref 11.7–14.4)
ESV (BP): 26.6 CM3
FRACTIONAL SHORTENING: 20.2 %
GFR SERPL CREATININE-BSD FRML MDRD: >60 ML/MIN/1.73M*2
GFR SERPL CREATININE-BSD FRML MDRD: >60 ML/MIN/1.73M*2
GLUCOSE SERPL-MCNC: 101 MG/DL (ref 70–99)
GLUCOSE SERPL-MCNC: 106 MG/DL (ref 70–99)
HCT VFR BLDCO AUTO: 38.3 %
HCV AB SER QL: NONREACTIVE
HDLC SERPL-MCNC: 50 MG/DL
HDLC SERPL: 4.8 {RATIO}
HGB BLD-MCNC: 13 G/DL
INTERVENTRICULAR SEPTUM: 0.86 CM
LA ESV (BP): 23.8 CM3
LA ESV INDEX (A2C): 11.66 CM3/M2
LA ESV INDEX (BP): 13.15 CM3/M2
LAAS-AP2: 10.7 CM2
LAAS-AP4: 12.6 CM2
LALD A4C: 4.45 CM
LALD A4C: 4.65 CM
LAV-S: 21.1 CM3
LDLC SERPL CALC-MCNC: 170 MG/DL
LEFT ATRIUM VOLUME INDEX: 14.48 CM3/M2
LEFT ATRIUM VOLUME: 26.2 CM3
LEFT INTERNAL DIMENSION IN SYSTOLE: 3.99 CM (ref 2.51–3.8)
LEFT VENTRICLE DIASTOLIC VOLUME INDEX: 28.01 CM3/M2
LEFT VENTRICLE DIASTOLIC VOLUME: 50.7 CM3
LEFT VENTRICLE SYSTOLIC VOLUME INDEX: 15.36 CM3/M2
LEFT VENTRICLE SYSTOLIC VOLUME: 27.8 CM3
LEFT VENTRICULAR INTERNAL DIMENSION IN DIASTOLE: 5 CM (ref 4.24–5.88)
LEFT VENTRICULAR POSTERIOR WALL IN END DIASTOLE: 0.9 CM (ref 0.56–1.03)
LV DIASTOLIC VOLUME: 47.2 CM3
LV ESV (APICAL 2 CHAMBER): 25.4 CM3
LVAD-AP2: 21.9 CM2
LVAD-AP4: 22.8 CM2
LVAS-AP2: 15.3 CM2
LVAS-AP4: 16.3 CM2
LVEDVI(A2C): 26.08 CM3/M2
LVEDVI(BP): 27.02 CM3/M2
LVESVI(A2C): 14.03 CM3/M2
LVESVI(BP): 14.7 CM3/M2
LVLD-AP2: 8.16 CM
LVLD-AP4: 8.14 CM
LVLS-AP2: 7.73 CM
LVLS-AP4: 7.79 CM
LVOT 2D: 2.1 CM
LVOT A: 3.46 CM2
LVOT MG: 1 MMHG
LVOT MV: 0.55 M/S
LVOT PEAK VELOCITY: 0.75 M/S
LVOT PG: 2 MMHG
LVOT VTI: 16.2 CM
MCH RBC QN AUTO: 30 PG (ref 28–33.2)
MCHC RBC AUTO-ENTMCNC: 33.9 G/DL (ref 32.2–35.5)
MCV RBC AUTO: 88.5 FL (ref 83–98)
MV E'TISSUE VEL-LAT: 0.04 M/S
MV E'TISSUE VEL-MED: 0.03 M/S
MV PEAK A VEL: 0.78 M/S
MV PEAK E VEL: 0.44 M/S
MV VALVE AREA P 1/2 METHOD: 4.31 CM2
NONHDLC SERPL-MCNC: 189 MG/DL
P AXIS: 39
PDW BLD AUTO: 8.8 FL (ref 9.4–12.3)
PLATELET # BLD AUTO: 189 K/UL
POSTERIOR WALL: 0.88 CM
POTASSIUM SERPL-SCNC: 3.7 MEQ/L (ref 3.6–5.1)
POTASSIUM SERPL-SCNC: 6.1 MEQ/L (ref 3.6–5.1)
PR INTERVAL: 156
QRS DURATION: 76
QT INTERVAL: 424
QTC CALCULATION(BAZETT): 486
R AXIS: 15
RBC # BLD AUTO: 4.33 M/UL (ref 3.93–5.22)
RVOT VMAX: 0.63 M/S
RVOT VTI: 13.1 CM
SODIUM SERPL-SCNC: 137 MEQ/L (ref 136–144)
SODIUM SERPL-SCNC: 139 MEQ/L (ref 136–144)
T WAVE AXIS: 42
TR MAX PG: 23 MMHG
TRICUSPID VALVE PEAK REGURGITATION VELOCITY: 2.41 M/S
TRIGL SERPL-MCNC: 96 MG/DL (ref 30–149)
TROPONIN I SERPL-MCNC: 0.33 NG/ML
TROPONIN I SERPL-MCNC: 0.35 NG/ML
TROPONIN I SERPL-MCNC: 0.59 NG/ML
VENTRICULAR RATE: 79
WBC # BLD AUTO: 7.54 K/UL
Z-SCORE OF LEFT VENTRICULAR DIMENSION IN END DIASTOLE: 0.02
Z-SCORE OF LEFT VENTRICULAR DIMENSION IN END SYSTOLE: 2.03
Z-SCORE OF LEFT VENTRICULAR POSTERIOR WALL IN END DIASTOLE: 0.9

## 2019-04-27 PROCEDURE — 63700000 HC SELF-ADMINISTRABLE DRUG: Performed by: STUDENT IN AN ORGANIZED HEALTH CARE EDUCATION/TRAINING PROGRAM

## 2019-04-27 PROCEDURE — 85730 THROMBOPLASTIN TIME PARTIAL: CPT | Performed by: HOSPITALIST

## 2019-04-27 PROCEDURE — 84484 ASSAY OF TROPONIN QUANT: CPT | Performed by: STUDENT IN AN ORGANIZED HEALTH CARE EDUCATION/TRAINING PROGRAM

## 2019-04-27 PROCEDURE — 99233 SBSQ HOSP IP/OBS HIGH 50: CPT | Performed by: HOSPITALIST

## 2019-04-27 PROCEDURE — 85730 THROMBOPLASTIN TIME PARTIAL: CPT | Performed by: STUDENT IN AN ORGANIZED HEALTH CARE EDUCATION/TRAINING PROGRAM

## 2019-04-27 PROCEDURE — 93010 ELECTROCARDIOGRAM REPORT: CPT | Performed by: INTERNAL MEDICINE

## 2019-04-27 PROCEDURE — 85027 COMPLETE CBC AUTOMATED: CPT | Performed by: STUDENT IN AN ORGANIZED HEALTH CARE EDUCATION/TRAINING PROGRAM

## 2019-04-27 PROCEDURE — 93005 ELECTROCARDIOGRAM TRACING: CPT | Performed by: STUDENT IN AN ORGANIZED HEALTH CARE EDUCATION/TRAINING PROGRAM

## 2019-04-27 PROCEDURE — 80048 BASIC METABOLIC PNL TOTAL CA: CPT | Performed by: HOSPITALIST

## 2019-04-27 PROCEDURE — 21400000 HC ROOM AND CARE CCU/INTERMEDIATE

## 2019-04-27 PROCEDURE — 36415 COLL VENOUS BLD VENIPUNCTURE: CPT | Performed by: HOSPITALIST

## 2019-04-27 PROCEDURE — 63600000 HC DRUGS/DETAIL CODE: Performed by: EMERGENCY MEDICINE

## 2019-04-27 PROCEDURE — 93306 TTE W/DOPPLER COMPLETE: CPT

## 2019-04-27 PROCEDURE — 80048 BASIC METABOLIC PNL TOTAL CA: CPT | Performed by: STUDENT IN AN ORGANIZED HEALTH CARE EDUCATION/TRAINING PROGRAM

## 2019-04-27 PROCEDURE — 99222 1ST HOSP IP/OBS MODERATE 55: CPT | Performed by: INTERNAL MEDICINE

## 2019-04-27 PROCEDURE — 80061 LIPID PANEL: CPT | Performed by: STUDENT IN AN ORGANIZED HEALTH CARE EDUCATION/TRAINING PROGRAM

## 2019-04-27 PROCEDURE — 93306 TTE W/DOPPLER COMPLETE: CPT | Mod: 26 | Performed by: INTERNAL MEDICINE

## 2019-04-27 PROCEDURE — 86803 HEPATITIS C AB TEST: CPT | Performed by: HOSPITALIST

## 2019-04-27 RX ORDER — ASPIRIN 81 MG/1
81 TABLET ORAL DAILY
Status: DISCONTINUED | OUTPATIENT
Start: 2019-04-27 | End: 2019-05-03 | Stop reason: HOSPADM

## 2019-04-27 RX ORDER — DEXTROSE 50 % IN WATER (D50W) INTRAVENOUS SYRINGE
25 AS NEEDED
Status: DISCONTINUED | OUTPATIENT
Start: 2019-04-27 | End: 2019-05-03 | Stop reason: HOSPADM

## 2019-04-27 RX ORDER — ROSUVASTATIN CALCIUM 10 MG/1
10 TABLET, COATED ORAL DAILY
Status: DISCONTINUED | OUTPATIENT
Start: 2019-04-27 | End: 2019-04-29

## 2019-04-27 RX ORDER — DEXTROSE 40 %
15-30 GEL (GRAM) ORAL AS NEEDED
Status: DISCONTINUED | OUTPATIENT
Start: 2019-04-27 | End: 2019-05-03 | Stop reason: HOSPADM

## 2019-04-27 RX ORDER — METOPROLOL SUCCINATE 50 MG/1
50 TABLET, EXTENDED RELEASE ORAL DAILY
Status: DISCONTINUED | OUTPATIENT
Start: 2019-04-27 | End: 2019-05-02

## 2019-04-27 RX ORDER — NITROGLYCERIN 0.4 MG/1
0.4 TABLET SUBLINGUAL EVERY 5 MIN PRN
Status: DISCONTINUED | OUTPATIENT
Start: 2019-04-27 | End: 2019-05-03 | Stop reason: HOSPADM

## 2019-04-27 RX ORDER — POTASSIUM CHLORIDE 750 MG/1
40 TABLET, FILM COATED, EXTENDED RELEASE ORAL ONCE
Status: COMPLETED | OUTPATIENT
Start: 2019-04-27 | End: 2019-04-27

## 2019-04-27 RX ORDER — IBUPROFEN 200 MG
16-32 TABLET ORAL AS NEEDED
Status: DISCONTINUED | OUTPATIENT
Start: 2019-04-27 | End: 2019-05-03 | Stop reason: HOSPADM

## 2019-04-27 RX ORDER — PANTOPRAZOLE SODIUM 40 MG/1
40 TABLET, DELAYED RELEASE ORAL DAILY
Status: DISCONTINUED | OUTPATIENT
Start: 2019-04-27 | End: 2019-05-03 | Stop reason: HOSPADM

## 2019-04-27 RX ORDER — ATROPINE SULFATE 0.1 MG/ML
0.5 INJECTION INTRAVENOUS EVERY 5 MIN PRN
Status: DISCONTINUED | OUTPATIENT
Start: 2019-04-27 | End: 2019-05-03 | Stop reason: HOSPADM

## 2019-04-27 RX ADMIN — ROSUVASTATIN CALCIUM 10 MG: 10 TABLET, FILM COATED ORAL at 08:15

## 2019-04-27 RX ADMIN — METOPROLOL SUCCINATE 50 MG: 50 TABLET, EXTENDED RELEASE ORAL at 08:15

## 2019-04-27 RX ADMIN — ASPIRIN 81 MG: 81 TABLET, COATED ORAL at 08:15

## 2019-04-27 RX ADMIN — HEPARIN SODIUM 700 UNITS/HR: 10000 INJECTION, SOLUTION INTRAVENOUS at 18:54

## 2019-04-27 RX ADMIN — PANTOPRAZOLE SODIUM 40 MG: 40 TABLET, DELAYED RELEASE ORAL at 08:06

## 2019-04-27 RX ADMIN — POTASSIUM CHLORIDE 40 MEQ: 750 TABLET, FILM COATED, EXTENDED RELEASE ORAL at 11:05

## 2019-04-27 ASSESSMENT — COGNITIVE AND FUNCTIONAL STATUS - GENERAL
HELP NEEDED FOR PERSONAL GROOMING: 4 - NONE
MOVING TO AND FROM BED TO CHAIR: 4 - NONE
EATING MEALS: 4 - NONE
WALKING IN HOSPITAL ROOM: 4 - NONE
DO YOU HAVE SERIOUS DIFFICULTY WALKING OR CLIMBING STAIRS: AMBULATION DIFFICULTY, DEPENDENT
DRESSING REGULAR UPPER BODY CLOTHING: 4 - NONE
STANDING UP FROM CHAIR USING ARMS: 4 - NONE
CLIMB 3 TO 5 STEPS WITH RAILING: 4 - NONE
HELP NEEDED FOR BATHING: 4 - NONE
TOILETING: 4 - NONE
DRESSING REGULAR LOWER BODY CLOTHING: 4 - NONE

## 2019-04-27 NOTE — H&P
"     Internal Medicine  History & Physical        CHIEF COMPLAINT   Chest pain   HISTORY OF PRESENT ILLNESS      This is a 74 y.o. female with a past medical history of Glaucoma, Hiatal hernia, AAA, HLD and rheumatic fever as a child who presents with chest pain.    History was taken from patient bedside. Patient was recently seen by Dr. Courtney for progressive SOB over the past several months. She was prescribed Rosuvastatin, Metop succinate and Nitro. She was already taking Aspirin. Patient was planned to get CT angiogram coronary.    A few months ago she began to notice the development of intermittent tightness across her upper chest and shortness of breath when climbing stairs, which is currently her most demanding physical activity.  The discomfort shortness of breath improved with resting for a few minutes.  At times she is capable of climbing of stairs without symptoms and then other times with just shortness of breath.      Today at work patient develops chest pain that felt like \"an elephant is sitting on my chest\" with associated b/l tingling of both of her hands. No palpitations or diaphoresis. No abdominal pain, nausea or vomiting. Patient denies recent illness, fevers or chills. Denies h/o DM or HTN. No LE edema, PND or orthopnea. There is no history of stroke or claudication.  She reports a childhood history of rheumatic fever but has never been told that there has been a significant heart murmur.    She is in chronic pain due to her hips which impairs her ability to sleep at night.  She reports being chronically fatigued.     In regards to her FH patient states he mother \"went to sleep and never woke up when she was 66\". Patient is a never smoker/drinker. Does not use drugs. He only medications were Pantoprazole, Aspirin and Red yeast.     Of note: Her most recent lipid profile (3/9/19) revealed the following:  Cholesterol 258, triglyceride 127, HDL 76, , non-.  She is taking red yeast " "rice but is not on a prescribed statin due to concerns about side effects.  She had developed GI side effects from atorvastatin and other statins but does not recall having been placed on rosuvastatin at any time. Her side effects are not associated with myalgias or muscle cramping but rather GI in nature.    She was found to have a mildly dilated abdominal aorta (2.4 x 2.5 cm) noted incidentally when MRI studies were performed to reevaluate her hips.    Vitals  BP: 130/64  Temp: 36.6 °C (97.9 °F)  Temp Source: Oral  Heart Rate: 81  Resp: 18  SpO2: 98 %  Height: 170.2 cm (5' 7\")  Weight: 70.8 kg (156 lb)    ED Medication Administration from 04/26/2019 2017 to 04/27/2019 0039       Date/Time Order Dose Route Action Action by     04/26/2019 2300 aspirin chewable tablet 243 mg 243 mg oral Given NIKOLE Pimentel     04/26/2019 2302 heparin (porcine) bolus from bag 4,000 Units 4,000 Units intravenous Bolus from NIKOLE Leal     04/26/2019 2301 heparin infusion in D5W 100 units/mL 1,000 Units/hr intravenous New Bag NIKOLE Pimentel            PAST MEDICAL AND SURGICAL HISTORY      PMHx:  Past Medical History:   Diagnosis Date   • AAA (abdominal aortic aneurysm) (CMS/HCC) ([facility])    • Arthritis    • Elevated blood pressure reading without diagnosis of hypertension 4/19/2019   • GERD (gastroesophageal reflux disease) 4/19/2019   • Glaucoma 4/19/2019   • Heart murmur    • Hiatal hernia    • History of hip replacement, total, right 4/19/2019    Right hip 9/ 2016 and revision 2017    • History of rheumatic fever as a child 4/19/2019   • Hypercholesterolemia 4/19/2019   • Osteoarthritis of multiple joints 4/19/2019   • Osteoporosis    • Raynaud's disease 4/19/2019   • RSD (reflex sympathetic dystrophy) 4/19/2019    Of left foot       PSHx:  Past Surgical History:   Procedure Laterality Date   • CHOLECYSTECTOMY OPEN     • DILATION AND CURETTAGE OF UTERUS     • EYE SURGERY      RIGHT CATARACT   • FOOT SURGERY Left    • JOINT REPLACEMENT Right 09/2016 "    total hip   • REVISION TOTAL HIP ARTHROPLASTY Right 2017   • TONSILLECTOMY         PCP:   Anitra Way MD    MEDICATIONS      Prior to Admission medications    Medication Sig Start Date End Date Taking? Authorizing Provider   amoxicillin (AMOXIL) 500 mg tablet Take 2,000 mg by mouth. TAKE 4 CAPSULES BY MOUTH 1 HOUR PRIOR TO DENTAL PROCEDURE 10/16/17   Amarilis Lara MD   aspirin (ASPIR-81) 81 mg enteric coated tablet Take 1 tablet (81 mg total) by mouth daily. 4/19/19 10/16/19  Tomasz Courtney MD   cetirizine HCl/pseudoephedrine (ALLERGY COMPLETE-D ORAL) Take 2 tablets by mouth daily.    Amarilis Lara MD   metoprolol succinate XL (TOPROL-XL) 50 mg 24 hr tablet Take 1 tablet (50 mg total) by mouth daily. 4/19/19 4/18/20  Tomasz Courtney MD   multivitamin tablet Take 1 tablet by mouth daily.    Amarilis Lara MD   naproxen sodium (ALEVE) 220 mg tablet Take 440 mg by mouth daily as needed for moderate pain.      Amarilis Lara MD   nitroglycerin (NITROSTAT) 0.4 mg SL tablet Place 1 tablet (0.4 mg total) under the tongue every 5 (five) minutes as needed for chest pain. 4/19/19   Tomasz Courtney MD   pantoprazole (PROTONIX) 40 mg EC tablet Take 40 mg by mouth daily.    Amarilis Lara MD   rosuvastatin (CRESTOR) 5 mg tablet Take 1 tablet (5 mg total) by mouth daily. 4/19/19 10/16/19  Tomasz Courtney MD       Home medications were personally reviewed.    ALLERGIES      Atorvastatin; Codeine; Dilaudid [hydromorphone]; Oxycodone; and Morphine sulfate    FAMILY HISTORY      Family History   Problem Relation Age of Onset   • Congenital heart disease Mother         noted at autopsy   • Heart attack Paternal Grandfather        SOCIAL HISTORY      Social History     Social History   • Marital status: Single     Spouse name: N/A   • Number of children: N/A   • Years of education: N/A     Social History Main Topics   • Smoking status: Never Smoker   • Smokeless tobacco: Never  Used   • Alcohol use Yes      Comment: VERY RARE   • Drug use: Unknown   • Sexual activity: Defer     Other Topics Concern   • Not on file     Social History Narrative   • No narrative on file       REVIEW OF SYSTEMS      Review of Systems  As per HPI  PHYSICAL EXAMINATION      Temp:  [36.6 °C (97.9 °F)] 36.6 °C (97.9 °F)  Heart Rate:  [79-81] 79  Resp:  [16-18] 16  BP: (130-139)/(64-69) 139/69  Body mass index is 23.97 kg/m².    Physical Exam   Constitutional: She is oriented to person, place, and time. She appears well-developed and well-nourished.   HENT:   Head: Normocephalic and atraumatic.   Eyes: Pupils are equal, round, and reactive to light. EOM are normal.   Neck: Normal range of motion. Neck supple. No JVD present.   Cardiovascular: Normal rate, regular rhythm and normal heart sounds.    Pulmonary/Chest: Effort normal and breath sounds normal.   Abdominal: Soft. Bowel sounds are normal. She exhibits no distension. There is no tenderness.   Neurological: She is alert and oriented to person, place, and time.   Skin: Skin is warm and dry. Capillary refill takes less than 2 seconds.   Psychiatric: She has a normal mood and affect. Her behavior is normal.   Extremities: extremities normal, warm and well-perfused; no cyanosis, clubbing, or edema      LABS / IMAGING / EKG        Labs  Lab Results   Component Value Date    WBC 6.15 04/26/2019    HGB 14.5 04/26/2019    HCT 43.0 04/26/2019    MCV 88.8 04/26/2019     04/26/2019     Lab Results   Component Value Date    GLUCOSE 108 (H) 04/26/2019    CALCIUM 9.4 04/26/2019     04/26/2019    K 3.7 04/26/2019    CO2 23 04/26/2019     04/26/2019    BUN 14 04/26/2019    CREATININE 0.8 04/26/2019     Lab Results   Component Value Date    ALT 17 04/26/2019    AST 24 04/26/2019    ALKPHOS 79 04/26/2019    BILITOT 0.5 04/26/2019     Lab Results   Component Value Date    INR 1.0 04/26/2019       Lab Results   Component Value Date    TROPONINI 0.12 (H)  "04/26/2019     Imaging  CXR: No acute disease.     ECG/Telemetry  ECG: NSR, 79 bpm, normal axis, no conduction abn, no signs for acute ischemia or infract. QTc 486 msec    ASSESSMENT AND PLAN           * NSTEMI (non-ST elevated myocardial infarction) (CMS/HCC) ([facility])   Assessment & Plan    - P/w progressive sob and typical chest pain  - CXR wnl, ECG non ischemic  - Troponin 0.12  - Patient is HD stable and afebrile. Saturates well on RA. NAD.    Plan:  - Trend trop to peak  - Heparin gtt   - Continue Metop succinate, Statin and Aspirin  - F/u TTE  - Cardiology consulted, appreciate recs     GERD (gastroesophageal reflux disease)   Assessment & Plan    - Continue PPI     Hypercholesterolemia   Assessment & Plan    - Had been intolerant to many statins before due to GI intolerance and \"muscle problems\"    Plan:  - Will try Rosuva 10 mg qd. If tolrates will increase to 20 mg. If not will need PSCK-9 inhibitor.           VTE Assessment: [place]    Code Status: Full Code  Estimated discharge date: 4/28/2019     ATTENDING DOCUMENTATION  ALSO SEE ATTENDING ATTESTATION SECTION OF NOTE                                     "

## 2019-04-27 NOTE — ASSESSMENT & PLAN NOTE
"- Had been intolerant to many statins before due to GI intolerance and \"muscle problems\"    Plan:  - Will try Rosuva 10 mg qd. If tolrates will increase to 20 mg. If not will need PSCK-9 inhibitor.   "

## 2019-04-27 NOTE — CONSULTS
Cardiology Consult Note  Jennifer Sams is a 74 y.o. female with past medical history of infrarenal abdominal aortic ectasia (2.6x2.8 cm on CT from Feb 2019, HLD difficulty tolerating statins in the past, large hiatal hernia, GERD who is admitted with exertional chest pressure. Pressure started while she was at her [facility] party, coming on predictably with activity, radiating to both of her arms. It was relieved with rest. Pain recurred all afternoon and when patient arrived home, she did not feel well and came to the ED. She took 2 SL nitro at home which helped her pain. She denies any pain now while at rest.     Denies dyspnea, orthopnea, PND, LE edema, palptiations, light headedness or dizziness.       Pertinent radiology results reviewed. Pertinent lab results reviewed..       Past Medical History:   Diagnosis Date   • AAA (abdominal aortic aneurysm) (CMS/HCC) ([facility])    • Arthritis    • Elevated blood pressure reading without diagnosis of hypertension 4/19/2019   • GERD (gastroesophageal reflux disease) 4/19/2019   • Glaucoma 4/19/2019   • Heart murmur    • Hiatal hernia    • History of hip replacement, total, right 4/19/2019    Right hip 9/ 2016 and revision 2017    • History of rheumatic fever as a child 4/19/2019   • Hypercholesterolemia 4/19/2019   • Osteoarthritis of multiple joints 4/19/2019   • Osteoporosis    • Raynaud's disease 4/19/2019   • RSD (reflex sympathetic dystrophy) 4/19/2019    Of left foot          Past Surgical History:   Procedure Laterality Date   • CHOLECYSTECTOMY OPEN     • DILATION AND CURETTAGE OF UTERUS     • EYE SURGERY      RIGHT CATARACT   • FOOT SURGERY Left    • JOINT REPLACEMENT Right 09/2016    total hip   • REVISION TOTAL HIP ARTHROPLASTY Right 2017   • TONSILLECTOMY         Social History     Social History   • Marital status: Single     Spouse name: N/A   • Number of children: N/A   • Years of education: N/A     Occupational History   • Not on file.     Social History  "Main Topics   • Smoking status: Never Smoker   • Smokeless tobacco: Never Used   • Alcohol use Yes      Comment: VERY RARE   • Drug use: Unknown   • Sexual activity: Defer     Other Topics Concern   • Not on file     Social History Narrative   • No narrative on file       Family Medical History:     Family History   Problem Relation Age of Onset   • Congenital heart disease Mother         noted at autopsy   • Heart attack Paternal Grandfather        Allergies:   Atorvastatin; Codeine; Dilaudid [hydromorphone]; Oxycodone; and Morphine sulfate    Outpatient Medications  •  amoxicillin, Take 2,000 mg by mouth. TAKE 4 CAPSULES BY MOUTH 1 HOUR PRIOR TO DENTAL PROCEDURE  •  aspirin, Take 1 tablet (81 mg total) by mouth daily.  •  cetirizine HCl/pseudoephedrine (ALLERGY COMPLETE-D ORAL), Take 2 tablets by mouth daily.  •  metoprolol succinate XL, Take 1 tablet (50 mg total) by mouth daily.  •  multivitamin, Take 1 tablet by mouth daily.  •  naproxen sodium, Take 440 mg by mouth daily as needed for moderate pain.    •  nitroglycerin, Place 1 tablet (0.4 mg total) under the tongue every 5 (five) minutes as needed for chest pain.  •  pantoprazole, Take 40 mg by mouth daily.  •  rosuvastatin, Take 1 tablet (5 mg total) by mouth daily.    Current medications    aspirin 81 mg oral Daily   metoprolol succinate XL 50 mg oral Daily   pantoprazole 40 mg oral Daily   rosuvastatin 10 mg oral Daily       All other systems reviewed and are negative except as per HPI    Objective   BP (!) 165/77 (BP Location: Left upper arm, Patient Position: Lying)   Pulse 77   Temp 36.6 °C (97.8 °F)   Resp 16   Ht 1.702 m (5' 7\")   Wt 69.4 kg (153 lb 1 oz)   SpO2 96%   BMI 23.97 kg/m²   Telemetry:     General: Alert, cooperative, no distress, appears stated age  HEENT: Normocephalic, atraumatic, PERRL, conjunctiva/cornea clear  Neck: Supple, no JVD, no carotid bruit  Lungs: No respiratory distress, clear to auscultation bilaterally  Chest " wall: No tenderness to palpation  Cardiovascular: Regular rate and rhythm, normal S1 and S2, no murmurs, rubs or gallops    Peripheral pulses symmetric and intact  Abdomen: Soft, nontender, nondistended, normal bowel sounds in all four quadrants, no masses or organomegaly  Extremities: Normal, atraumatic, no cyanosis or edema  Musculoskeletal: No injury or deformity  Skin: Skin color, texture and turgor normal     Labs   CMP Results       04/27/19 04/27/19 04/26/19                    0615 0500 2110          137 138         K 3.7 6.1 (HH) 3.7         Cl 108 107 105         CO2 21 (L) 20 (L) 23         Glucose 101 (H) 106 (H) 108 (H)         BUN 9 12 14         Creatinine 0.7 0.7 0.8         Calcium 8.7 (L) 8.5 (L) 9.4         Anion Gap 10 10 10         AST - - 24         ALT - - 17         Albumin - - 4.1         EGFR &gt;60.0 &gt;60.0 &gt;60.0         Comment for K at 0500 on 04/27/19:    GROSS HEMOLYSIS, RESULT MAY BE INCREASED. RECOLLECT RECOMMENDED.    Comment for K at 2110 on 04/26/19:    Results obtained on plasma. Plasma Potassium values may be up to 0.4 mEQ/L less than serum values. The differences may be greater for patients with high platelet or white cell counts.        CBC Results       04/27/19 04/26/19 10/04/16                    0500 2110 0722         WBC 7.54 6.15 11.02 (H)         RBC 4.33 4.84 3.26 (L)         HGB 13.0 14.5 9.9 (L)         HCT 38.3 43.0 30.2 (L)         MCV 88.5 88.8 92.6         MCH 30.0 30.0 30.4         MCHC 33.9 33.7 32.8          223 156                       Imaging  CT report from Feb 2019 reviewed    TTE  No previous    ECG   SR wnl, QTc 486ms    Assessment/Plan    1. NSTEMI  - Troponin 0.12 -> 0.33  - She is currently asymptomatic on heparin ggt. Would like to keep her pain at bay this weekend and plan for cardiac catheterization Monday when there is full hospital staff support. If she has recurrent pain, however, may need to take urgently.   - Continue to  trend troponins to peak  - Continue heparin ggt, aspirin, Metoprolol  -Add Imdur 10 if her BP remains stable throughout the morning  - Keep NPO for 6 additional hours to ensure no recurrent pain and no need for urgent cath at this time; can eat after this.   - TTE ordered    2. Dyslipidemia  - , TG 96, HDL 50,   - Agree with Crestor as started by team, would like to increase dose to 20 and ultimately 40 if she tolerates 10mg. She had a history of myalgias due to hip issues and had some difficulty tolerating statins in the past, but this was some time ago. Denies any rhabdomyolysis or similar complications.   - Goal LDL < 70 in this patient     3. Infrarenal abdominal aortic ectasia   - Stable on recent CT scan performed in Feb 2019.         Geni Erickson MD  4/27/2019

## 2019-04-27 NOTE — ASSESSMENT & PLAN NOTE
- P/w progressive sob and typical chest pain  - CXR wnl, ECG non ischemic  - Troponin 0.12  - Patient is HD stable and afebrile. Saturates well on RA. NAD.    Plan:  - Trend trop to peak  - Heparin gtt   - Continue Metop succinate, Statin and Aspirin  - F/u TTE  - Cardiology consulted, appreciate recs

## 2019-04-27 NOTE — ASSESSMENT & PLAN NOTE
"- Had been intolerant to many statins before due to GI intolerance and \"muscle problems\"    Plan:  - tolerating rosuvastatin 20 mg.   "

## 2019-04-27 NOTE — PLAN OF CARE
Problem: Cardiac: ACS (Acute Coronary Syndrome) (Adult)  Intervention: Optimize Myocardial Oxygenation/Perfusion   04/27/19 0130   Activity   Activity activity adjusted per tolerance     Intervention: Prevent/Manage Thrombi/Emboli   04/27/19 0130   Minimize Embolism Risk   VTE Prevention/Management anticoagulant therapy maintained     Intervention: Support Psychosocial Response to Life-changing Event/Hospitalization   04/27/19 0130   Coping/Psychosocial Interventions   Supportive Measures active listening utilized

## 2019-04-27 NOTE — ED PROVIDER NOTES
HPI     Chief Complaint   Patient presents with   • Chest Pain       74-year-old female with past medical history hypertension, high cholesterol who presents with several weeks of exertional shortness of breath.  She saw a cardiologist for that earlier this week and is scheduled to have a CT coronary on Monday.  They have also prescribed her nitroglycerin.  Today she was doing some activity and she suddenly developed chest heaviness.  It radiated to both of her arms.  She said this is something she is never experienced before.  No nausea vomiting, mild associated shortness of breath.  No abdominal pain, no leg pain or swelling.        Chest Pain   Pain location:  Substernal area  Pain quality: pressure    Pain radiates to:  L arm and R arm  Pain severity:  Moderate  Onset quality:  Sudden  Timing:  Constant  Progression:  Resolved  Chronicity:  New  Relieved by:  Nitroglycerin  Worsened by:  Exertion  Associated symptoms: shortness of breath    Associated symptoms: no abdominal pain, no AICD problem, no altered mental status, no anorexia, no anxiety, no back pain, no cough, no dysphagia, no fatigue, no fever, no headache, no heartburn, no lower extremity edema, no nausea, no near-syncope, no numbness, no palpitations, no vomiting and no weakness    Risk factors: high cholesterol and hypertension         Patient History     Past Medical History:   Diagnosis Date   • AAA (abdominal aortic aneurysm) (CMS/[facility]) ([facility])    • Arthritis    • Elevated blood pressure reading without diagnosis of hypertension 4/19/2019   • GERD (gastroesophageal reflux disease) 4/19/2019   • Glaucoma 4/19/2019   • Heart murmur    • Hiatal hernia    • History of hip replacement, total, right 4/19/2019    Right hip 9/ 2016 and revision 2017    • History of rheumatic fever as a child 4/19/2019   • Hypercholesterolemia 4/19/2019   • Osteoarthritis of multiple joints 4/19/2019   • Osteoporosis    • Raynaud's disease 4/19/2019   • RSD (reflex  "sympathetic dystrophy) 4/19/2019    Of left foot       Past Surgical History:   Procedure Laterality Date   • CHOLECYSTECTOMY OPEN     • DILATION AND CURETTAGE OF UTERUS     • EYE SURGERY      RIGHT CATARACT   • FOOT SURGERY Left    • JOINT REPLACEMENT Right 09/2016    total hip   • REVISION TOTAL HIP ARTHROPLASTY Right 2017   • TONSILLECTOMY         Family History   Problem Relation Age of Onset   • Congenital heart disease Mother         noted at autopsy   • Heart attack Paternal Grandfather        Social History   Substance Use Topics   • Smoking status: Never Smoker   • Smokeless tobacco: Never Used   • Alcohol use Yes      Comment: VERY RARE       Systems Reviewed from Nursing Triage:          Review of Systems     Review of Systems   Constitutional: Negative for fatigue and fever.   HENT: Negative for trouble swallowing.    Respiratory: Positive for shortness of breath. Negative for cough.    Cardiovascular: Positive for chest pain. Negative for palpitations and near-syncope.   Gastrointestinal: Negative for abdominal pain, anorexia, heartburn, nausea and vomiting.   Musculoskeletal: Negative for back pain.   Neurological: Negative for weakness, numbness and headaches.   All other systems reviewed and are negative.       Physical Exam     ED Triage Vitals [04/26/19 2022]   Temp Heart Rate Resp BP SpO2   36.6 °C (97.9 °F) 81 18 130/64 98 %      Temp Source Heart Rate Source Patient Position BP Location FiO2 (%) (Set)   Oral -- Sitting Left upper arm --                     Patient Vitals for the past 24 hrs:   BP Temp Temp src Pulse Resp SpO2 Height Weight   04/26/19 2022 130/64 36.6 °C (97.9 °F) Oral 81 18 98 % - -   04/26/19 2019 - - - - - - 1.702 m (5' 7\") 70.8 kg (156 lb)           Physical Exam   Constitutional: She is oriented to person, place, and time. She appears well-developed and well-nourished.   HENT:   Head: Normocephalic and atraumatic.   Eyes: Pupils are equal, round, and reactive to light. EOM " are normal.   Neck: Normal range of motion. Neck supple.   Cardiovascular: Normal rate, regular rhythm, normal heart sounds and intact distal pulses.    Pulmonary/Chest: Effort normal and breath sounds normal.   Abdominal: Soft. Bowel sounds are normal.   Musculoskeletal: Normal range of motion.   Neurological: She is alert and oriented to person, place, and time.   Skin: Skin is warm and dry. Capillary refill takes less than 2 seconds.   Psychiatric: She has a normal mood and affect.            ECG 12 lead  Date/Time: 4/26/2019 10:23 PM  Performed by: ZOË LEWIS  Authorized by: ZOË LEWIS     ECG reviewed by ED Physician in the absence of a cardiologist: yes    Rate:     ECG rate:  79    ECG rate assessment: normal    Rhythm:     Rhythm: sinus rhythm    Ectopy:     Ectopy: none    QRS:     QRS axis:  Normal    QRS intervals:  Normal  Conduction:     Conduction: normal    ST segments:     ST segments:  Normal  T waves:     T waves: normal      Critical Care  Performed by: ZOË LEWIS  Authorized by: ZOË LEWIS     Critical care provider statement:     Critical care time (minutes):  20    Critical care time was exclusive of:  Separately billable procedures and treating other patients    Critical care was necessary to treat or prevent imminent or life-threatening deterioration of the following conditions:  Cardiac failure    Critical care was time spent personally by me on the following activities:  Development of treatment plan with patient or surrogate, discussions with consultants, evaluation of patient's response to treatment, obtaining history from patient or surrogate, ordering and review of laboratory studies, ordering and review of radiographic studies, pulse oximetry, re-evaluation of patient's condition and review of old charts        ED Course & MDM     Labs Reviewed   COMPREHENSIVE METABOLIC PANEL - Abnormal        Result Value    Sodium 138      Potassium 3.7      Chloride  105      CO2 23      BUN 14      Creatinine 0.8      Glucose 108 (*)     Calcium 9.4      AST (SGOT) 24      ALT (SGPT) 17      Alkaline Phosphatase 79      Total Protein 6.8      Albumin 4.1      Bilirubin, Total 0.5      eGFR >60.0      Anion Gap 10     TROPONIN I - Abnormal     Troponin I 0.12 (*)    CBC - Abnormal     WBC 6.15      RBC 4.84      Hemoglobin 14.5      Hematocrit 43.0      MCV 88.8      MCH 30.0      MCHC 33.7      RDW 13.0      Platelets 223      MPV 8.9 (*)    CBC AND DIFFERENTIAL    Narrative:     The following orders were created for panel order CBC and differential.  Procedure                               Abnormality         Status                     ---------                               -----------         ------                     CBC[[id number]]                           Abnormal            Final result               Diff Count[[id number]]                                        Final result                 Please view results for these tests on the individual orders.   DIFF COUNT    Differential Type Auto      nRBC 0.0      Immature Granulocytes 0.5      Neutrophils 60.8      Lymphocytes 27.8      Monocytes 8.6      Eosinophils 1.5      Basophils 0.8      Immature Granulocytes, Absolute 0.03      Neutrophils, Absolute 3.74      Lymphocytes, Absolute 1.71      Monocytes, Absolute 0.53      Eosinophils, Absolute 0.09      Basophils, Absolute 0.05         ECG 12 lead    (Results Pending)   X-RAY CHEST 2 VIEWS    (Results Pending)               MDM  Number of Diagnoses or Management Options  NSTEMI (non-ST elevated myocardial infarction) (CMS/[facility]) ([facility]):      Amount and/or Complexity of Data Reviewed  Clinical lab tests: reviewed             ED Course as of Apr 26 2325 Fri Apr 26, 2019   2243 Disccussed with cardiology fellow who recommends heparin drip, ASA, NTG for pain and NPO after midnight  [MARGIE]      ED Course User Index  [MARGIE] Kita Mondragon MD         Clinical Impressions as of  Apr 26 2325   NSTEMI (non-ST elevated myocardial infarction) (CMS/HCC) (HCC)        Kita Mondragon MD  04/26/19 0763

## 2019-04-27 NOTE — PROGRESS NOTES
Internal Medicine  Daily Progress Note       SUBJECTIVE   This is a 74 y.o. year-old female admitted on 2019 with NSTEMI (non-ST elevated myocardial infarction) (CMS/HCC) ([facility]) [I21.4].    Interval History: No complaints today. A little anxious ebcause she doesn't know what is going on. Chest heaviness completely resolved in the night. NO CP, palpitations, SOB, N/V, numbness/tingling     OBJECTIVE      Vital signs in last 24 hours:  Temp:  [36.6 °C (97.8 °F)-36.6 °C (97.9 °F)] 36.6 °C (97.8 °F)  Heart Rate:  [61-81] 61  Resp:  [16-18] 18  BP: (130-165)/(64-77) 131/66  Temp (24hrs), Av.6 °C (97.9 °F), Min:36.6 °C (97.8 °F), Max:36.6 °C (97.9 °F)    Intake/Output     None          PHYSICAL EXAMINATION      Physical Exam   Constitutional: She is oriented to person, place, and time. She appears well-developed and well-nourished. No distress.   HENT:   Head: Normocephalic and atraumatic.   Nose: Nose normal.   Eyes: Right eye exhibits no discharge. Left eye exhibits no discharge. No scleral icterus.   Neck: Neck supple.   Cardiovascular: Normal rate, regular rhythm, normal heart sounds and intact distal pulses.  Exam reveals no gallop and no friction rub.    No murmur heard.  Pulmonary/Chest: Effort normal and breath sounds normal. No respiratory distress. She has no wheezes. She has no rales. She exhibits no tenderness.   Abdominal: Soft. Bowel sounds are normal. She exhibits no distension. There is no tenderness.   Musculoskeletal: She exhibits no edema.   Neurological: She is alert and oriented to person, place, and time.   Skin: Skin is warm and dry. Capillary refill takes less than 2 seconds. She is not diaphoretic.   Psychiatric: She has a normal mood and affect. Her behavior is normal. Judgment and thought content normal.   Vitals reviewed.       LINES, CATHETERS, DRAINS, AIRWAYS, AND WOUNDS   Lines, Drains, Airways, Wounds:  Peripheral IV 19 Right Antecubital (Active)   Number of days: 1  "      Comments:      LABS / IMAGING / TELE      Labs    Results from last 7 days  Lab Units 04/27/19  0615  04/26/19  2110   SODIUM mEQ/L 139  < > 138   POTASSIUM mEQ/L 3.7  < > 3.7   CHLORIDE mEQ/L 108  < > 105   CO2 mEQ/L 21*  < > 23   BUN mg/dL 9  < > 14   CREATININE mg/dL 0.7  < > 0.8   CALCIUM mg/dL 8.7*  < > 9.4   ALBUMIN g/dL  --   --  4.1   BILIRUBIN TOTAL mg/dL  --   --  0.5   ALK PHOS IU/L  --   --  79   ALT IU/L  --   --  17   AST IU/L  --   --  24   GLUCOSE mg/dL 101*  < > 108*   < > = values in this interval not displayed.          Results from last 7 days  Lab Units 04/27/19  0500   WBC K/uL 7.54   HEMOGLOBIN g/dL 13.0   HEMATOCRIT % 38.3   PLATELETS K/uL 189     trop - 0.33 - repeat pending    Imaging  I have independently reviewed the pertinent imaging from the last 24 hrs.    ECG/Telemetry  I have independently reviewed the telemetry. No events for the last 24 hours.    ASSESSMENT AND PLAN      * NSTEMI (non-ST elevated myocardial infarction) (CMS/HCC) ([facility])   Assessment & Plan    P/w progressive sob and typical chest pain  ECG non ischemic  Troponin 0.12 --> 0.33    Plan:  - Trend trop to peak  - Heparin gtt   - Continue Metop succinate, Statin and Aspirin  - F/u TTE  - Cardiology consulted, appreciate recs - cath Monday     GERD (gastroesophageal reflux disease)   Assessment & Plan    - Continue PPI     Hypercholesterolemia   Assessment & Plan    - Had been intolerant to many statins before due to GI intolerance and \"muscle problems\"    Plan:  - Will try Rosuva 10 mg qd. If tolrates will increase to 20 mg.          VTE Assessment: [place]    Code Status: Full Code  Estimated discharge date: 4/28/2019     ATTENDING DOCUMENTATION  ALSO SEE ATTENDING ATTESTATION SECTION OF NOTE                          "

## 2019-04-27 NOTE — ASSESSMENT & PLAN NOTE
P/w progressive sob and typical chest pain  ECG non ischemic  Troponin peaked at 0.59  TTE and ECG showing LAD territory  [facility] with diffuse disease, LAD received FRANK as thought to be the culprit lesion. Not amenable to CABG given no touchdown sites.     Plan:  - Serial ECGs  - Heparin gtt off.   - Continue Metop succinate, Statin and Aspirin  - Will try 2.5 mg lisinopril prior to discharge.   - Cardiology consulted, appreciate recs.   - [facility] this am for staged FRANK to RCA x3. Given prolonged procedure will have to do Lcx today at 1 pm.

## 2019-04-27 NOTE — PLAN OF CARE
Problem: Patient Care Overview  Goal: Plan of Care Review  Outcome: Ongoing (interventions implemented as appropriate)   04/27/19 0814   Coping/Psychosocial   Plan Of Care Reviewed With patient   Plan of Care Review   Progress progress toward functional goals as expected       Problem: Cardiac: ACS (Acute Coronary Syndrome) (Adult)  Goal: Signs and Symptoms of Listed Potential Problems Will be Absent, Minimized or Managed (Cardiac: ACS)  Outcome: Ongoing (interventions implemented as appropriate)

## 2019-04-28 LAB
ANION GAP SERPL CALC-SCNC: 12 MEQ/L (ref 3–15)
APTT PPP: 79 SEC (ref 23–35)
APTT PPP: 81 SEC (ref 23–35)
ATRIAL RATE: 63
BUN SERPL-MCNC: 16 MG/DL (ref 8–20)
CALCIUM SERPL-MCNC: 9.4 MG/DL (ref 8.9–10.3)
CHLORIDE SERPL-SCNC: 104 MEQ/L (ref 98–109)
CO2 SERPL-SCNC: 22 MEQ/L (ref 22–32)
CREAT SERPL-MCNC: 1 MG/DL
GFR SERPL CREATININE-BSD FRML MDRD: 54.2 ML/MIN/1.73M*2
GLUCOSE SERPL-MCNC: 146 MG/DL (ref 70–99)
MAGNESIUM SERPL-MCNC: 2 MG/DL (ref 1.8–2.5)
P AXIS: 53
POTASSIUM SERPL-SCNC: 3.8 MEQ/L (ref 3.6–5.1)
PR INTERVAL: 176
QRS DURATION: 82
QT INTERVAL: 494
QTC CALCULATION(BAZETT): 505
R AXIS: 20
SODIUM SERPL-SCNC: 138 MEQ/L (ref 136–144)
T WAVE AXIS: 56
TROPONIN I SERPL-MCNC: 0.5 NG/ML
VENTRICULAR RATE: 63

## 2019-04-28 PROCEDURE — 93005 ELECTROCARDIOGRAM TRACING: CPT | Performed by: STUDENT IN AN ORGANIZED HEALTH CARE EDUCATION/TRAINING PROGRAM

## 2019-04-28 PROCEDURE — 84484 ASSAY OF TROPONIN QUANT: CPT | Performed by: STUDENT IN AN ORGANIZED HEALTH CARE EDUCATION/TRAINING PROGRAM

## 2019-04-28 PROCEDURE — 99233 SBSQ HOSP IP/OBS HIGH 50: CPT | Performed by: INTERNAL MEDICINE

## 2019-04-28 PROCEDURE — 85730 THROMBOPLASTIN TIME PARTIAL: CPT | Performed by: STUDENT IN AN ORGANIZED HEALTH CARE EDUCATION/TRAINING PROGRAM

## 2019-04-28 PROCEDURE — 80048 BASIC METABOLIC PNL TOTAL CA: CPT | Performed by: STUDENT IN AN ORGANIZED HEALTH CARE EDUCATION/TRAINING PROGRAM

## 2019-04-28 PROCEDURE — 63700000 HC SELF-ADMINISTRABLE DRUG: Performed by: STUDENT IN AN ORGANIZED HEALTH CARE EDUCATION/TRAINING PROGRAM

## 2019-04-28 PROCEDURE — 93010 ELECTROCARDIOGRAM REPORT: CPT | Performed by: INTERNAL MEDICINE

## 2019-04-28 PROCEDURE — 99233 SBSQ HOSP IP/OBS HIGH 50: CPT | Performed by: HOSPITALIST

## 2019-04-28 PROCEDURE — 36415 COLL VENOUS BLD VENIPUNCTURE: CPT | Performed by: STUDENT IN AN ORGANIZED HEALTH CARE EDUCATION/TRAINING PROGRAM

## 2019-04-28 PROCEDURE — 21400000 HC ROOM AND CARE CCU/INTERMEDIATE

## 2019-04-28 PROCEDURE — 83735 ASSAY OF MAGNESIUM: CPT | Performed by: STUDENT IN AN ORGANIZED HEALTH CARE EDUCATION/TRAINING PROGRAM

## 2019-04-28 RX ORDER — POTASSIUM CHLORIDE 750 MG/1
20 TABLET, FILM COATED, EXTENDED RELEASE ORAL ONCE
Status: COMPLETED | OUTPATIENT
Start: 2019-04-28 | End: 2019-04-28

## 2019-04-28 RX ADMIN — ASPIRIN 81 MG: 81 TABLET, COATED ORAL at 08:10

## 2019-04-28 RX ADMIN — POTASSIUM CHLORIDE 20 MEQ: 750 TABLET, FILM COATED, EXTENDED RELEASE ORAL at 12:07

## 2019-04-28 RX ADMIN — ROSUVASTATIN CALCIUM 10 MG: 10 TABLET, FILM COATED ORAL at 08:10

## 2019-04-28 RX ADMIN — PANTOPRAZOLE SODIUM 40 MG: 40 TABLET, DELAYED RELEASE ORAL at 08:10

## 2019-04-28 RX ADMIN — METOPROLOL SUCCINATE 50 MG: 50 TABLET, EXTENDED RELEASE ORAL at 08:10

## 2019-04-28 NOTE — PROGRESS NOTES
Internal Medicine  Daily Progress Note       SUBJECTIVE   This is a 74 y.o. year-old female admitted on 2019 with NSTEMI (non-ST elevated myocardial infarction) (CMS/HCC) ([facility]) [I21.4].    Interval History: No events in the night. Trops peaked. ECG this morning with lateral T wave inversions asx     OBJECTIVE      Vital signs in last 24 hours:  Temp:  [36.2 °C (97.1 °F)-36.9 °C (98.5 °F)] 36.2 °C (97.1 °F)  Heart Rate:  [56-74] 74  Resp:  [16-18] 18  BP: (110-120)/(52-63) 111/60  Temp (24hrs), Av.6 °C (97.9 °F), Min:36.2 °C (97.1 °F), Max:36.9 °C (98.5 °F)    Intake/Output     None          PHYSICAL EXAMINATION      Physical Exam   Constitutional: She is oriented to person, place, and time. She appears well-developed and well-nourished. No distress.   HENT:   Head: Normocephalic and atraumatic.   Nose: Nose normal.   Eyes: Right eye exhibits no discharge. Left eye exhibits no discharge. No scleral icterus.   Neck: Neck supple. No JVD present.   Cardiovascular: Normal rate, regular rhythm, normal heart sounds and intact distal pulses.  Exam reveals no gallop and no friction rub.    No murmur heard.  Pulmonary/Chest: Effort normal and breath sounds normal. No respiratory distress. She has no wheezes. She has no rales. She exhibits no tenderness.   Abdominal: Soft. Bowel sounds are normal.   Musculoskeletal: She exhibits no edema.   Neurological: She is alert and oriented to person, place, and time.   Skin: Skin is warm and dry. Capillary refill takes less than 2 seconds. She is not diaphoretic.   Psychiatric: She has a normal mood and affect. Her behavior is normal. Judgment and thought content normal.   Vitals reviewed.         LINES, CATHETERS, DRAINS, AIRWAYS, AND WOUNDS   Lines, Drains, Airways, Wounds:  Peripheral IV 19 Right Antecubital (Active)   Number of days: 2       Comments:      LABS / IMAGING / TELE      Labs    Results from last 7 days  Lab Units 19  0934  19  2121  "  SODIUM mEQ/L 138  < > 138   POTASSIUM mEQ/L 3.8  < > 3.7   CHLORIDE mEQ/L 104  < > 105   CO2 mEQ/L 22  < > 23   BUN mg/dL 16  < > 14   CREATININE mg/dL 1.0  < > 0.8   CALCIUM mg/dL 9.4  < > 9.4   ALBUMIN g/dL  --   --  4.1   BILIRUBIN TOTAL mg/dL  --   --  0.5   ALK PHOS IU/L  --   --  79   ALT IU/L  --   --  17   AST IU/L  --   --  24   GLUCOSE mg/dL 146*  < > 108*   < > = values in this interval not displayed.      Results from last 7 days  Lab Units 04/28/19  0934   MAGNESIUM mg/dL 2.0       Results from last 7 days  Lab Units 04/27/19  0500   WBC K/uL 7.54   HEMOGLOBIN g/dL 13.0   HEMATOCRIT % 38.3   PLATELETS K/uL 189         Imaging  I have independently reviewed the pertinent imaging from the last 24 hrs.    ECG/Telemetry  I have independently reviewed the ECG. Significant findings include anterolateral T wave inversions.    ASSESSMENT AND PLAN      * NSTEMI (non-ST elevated myocardial infarction) (CMS/HCC) (HCC)   Assessment & Plan    P/w progressive sob and typical chest pain  ECG non ischemic  Troponin peaked at 0.59  TTE and ECG showing LAD territory  For such minor trop spill, this is more consistent with takotsubo CM    Plan:  - Serial ECGs  - Heparin gtt   - Continue Metop succinate, Statin and Aspirin  - Cardiology consulted, appreciate recs - cath Monday     GERD (gastroesophageal reflux disease)   Assessment & Plan    - Continue PPI     Hypercholesterolemia   Assessment & Plan    - Had been intolerant to many statins before due to GI intolerance and \"muscle problems\"    Plan:  - tolerating rosuvastatin 10mg it seems - had some loose stools, will monitor          VTE Assessment: Padua    Code Status: Full Code  Estimated discharge date: 4/28/2019     ATTENDING DOCUMENTATION  ALSO SEE ATTENDING ATTESTATION SECTION OF NOTE                          "

## 2019-04-28 NOTE — PLAN OF CARE
Problem: Patient Care Overview  Goal: Plan of Care Review  Outcome: Ongoing (interventions implemented as appropriate)   04/28/19 0840   Coping/Psychosocial   Plan Of Care Reviewed With patient   Plan of Care Review   Progress progress toward functional goals as expected       Problem: Cardiac: ACS (Acute Coronary Syndrome) (Adult)  Goal: Signs and Symptoms of Listed Potential Problems Will be Absent, Minimized or Managed (Cardiac: ACS)  Outcome: Ongoing (interventions implemented as appropriate)

## 2019-04-28 NOTE — PROGRESS NOTES
"Cardiology Progress Note    ASSESSMENT AND PLAN   1. NSTEMI  Asymptomatic. No further cp since admission. Ambulating in hallway without CP.  - Troponin peak 0.53 now down trending. EKG today showed new T wave inversion in anterior leads compared to admission EKG without recurrent chest pain  - Echo moderately decreased LV function, WMA c/w  LAD infarct vs stress cardiomyopathy.   - Overall presentation is atypical for acute LAD infarct. I would expect her to be symptomatic and higher Trop level with this degree of WMA seen on echo if she is presenting with acute LAD infarct.. Likely stress cardiomyopathy.  - cath tomorrow , sooner if she developed cp.   - Continue heparin ggt, aspirin, Metoprolol       2. Dyslipidemia  - , TG 96, HDL 50,   - Agree with Crestor as started by team, would like to increase dose to 20 and ultimately 40 if she tolerates 10mg. She had a history of myalgias due to hip issues and had some difficulty tolerating statins in the past, but this was some time ago. Denies any rhabdomyolysis or similar complications.   - Goal LDL < 70 in this patient     3. Infrarenal abdominal aortic ectasia   - Stable on recent CT scan performed in Feb 2019.          SUBJECTIVE  No acute events overnight.    Patient is doing well. Pt has no complaints: no chest pain/tightness, shortness of breath, palpitations, LE swelling.    Meds:   aspirin 81 mg oral Daily   metoprolol succinate XL 50 mg oral Daily   pantoprazole 40 mg oral Daily   rosuvastatin 10 mg oral Daily       Review of Systems:  10 point review of systems completed and otherwise negative unless noted above in the HPI    Past medical history,active medical problems list and social history were all reviewed and updated as necessary today.    OBJECTIVE:   /60 (BP Location: Left upper arm, Patient Position: Lying)   Pulse 74   Temp 36.2 °C (97.1 °F) (Oral)   Resp 18   Ht 1.702 m (5' 7.01\")   Wt 69.4 kg (153 lb 1 oz)   SpO2 95%   " BMI 23.97 kg/m²   No intake or output data in the 24 hours ending 04/28/19 1237    Physical Exam   Constitutional: Well-developed and well-nourished. No distress.   HENT: Normocephalic and atraumatic. Moist mucous membranes.  Eyes: EOM are normal.    Neck: No JVD present. No carotid bruits.  Cardiovascular: Normal rate and regular rhythm. No murmur.  Pulmonary/Chest: Normal effort and air entry. No wheezing, rales, ronchi.  Abdominal: Normal bowel sounds. Soft, non tender.  Extremities: No lower extremity edema.  Neurological: Alert and oriented to person, place, and time.   Skin: Skin is warm and dry. No rash.  Psychiatric: Normal mood, affect and behavior.    Labs    CBC Results       04/27/19 04/26/19 10/04/16                    0500 2110 0722         WBC 7.54 6.15 11.02 (H)         RBC 4.33 4.84 3.26 (L)         HGB 13.0 14.5 9.9 (L)         HCT 38.3 43.0 30.2 (L)         MCV 88.5 88.8 92.6         MCH 30.0 30.0 30.4         MCHC 33.9 33.7 32.8          223 156                     CMP Results       04/28/19 04/27/19 04/27/19                    0934 0615 0500          139 137         K 3.8 3.7 6.1 (HH)         Cl 104 108 107         CO2 22 21 (L) 20 (L)         Glucose 146 (H) 101 (H) 106 (H)         BUN 16 9 12         Creatinine 1.0 0.7 0.7         Calcium 9.4 8.7 (L) 8.5 (L)         Anion Gap 12 10 10         EGFR 54.2 (L) &gt;60.0 &gt;60.0         Comment for K at 0500 on 04/27/19:    GROSS HEMOLYSIS, RESULT MAY BE INCREASED. RECOLLECT RECOMMENDED.          Results from last 7 days  Lab Units 04/28/19  0057 04/27/19  1913 04/27/19  1220   TROPONIN I ng/mL 0.50* 0.59* 0.35*       Cardiology results    TELEMETRY  SINUS RHYTHM    Cardiac Imaging   TRANSTHORACIC ECHO (TTE) COMPLETE 04/27/2019    Technically difficult study.    Normal left ventricular cavity size.  Normal wall thickness.  The mid and   apical left ventricular segments are akinetic/dyskinetic..  Ejection   fraction is moderately  depressed.  This is likely injury/stunning in the   LAD perfusion territory but cannot exclude stress-induced cardiomyopathy.    No obvious apical thrombus.  Impaired left ventricular relaxation.    Mild mitral valve sclerosis.  Mild left atrial dilatation.  Mild mitral   insufficiency.    Aortic valve sclerosis.   mild aortic insufficiency.    Normal right heart.         Imaging:  Transthoracic Echo (tte) Complete    Result Date: 4/27/2019  Technically difficult study. Normal left ventricular cavity size.  Normal wall thickness.  The mid and apical left ventricular segments are akinetic/dyskinetic..  Ejection fraction is moderately depressed.  This is likely injury/stunning in the LAD perfusion territory but cannot exclude stress-induced cardiomyopathy.  No obvious apical thrombus.  Impaired left ventricular relaxation. Mild mitral valve sclerosis.  Mild left atrial dilatation.  Mild mitral insufficiency. Aortic valve sclerosis.   mild aortic insufficiency. Normal right heart.         Noé Moncada MD  4/28/2019  12:37 PM

## 2019-04-29 LAB
ANION GAP SERPL CALC-SCNC: 11 MEQ/L (ref 3–15)
APTT PPP: 60 SEC (ref 23–35)
APTT PPP: 68 SEC (ref 23–35)
ATRIAL RATE: 51
ATRIAL RATE: 58
BUN SERPL-MCNC: 15 MG/DL (ref 8–20)
CALCIUM SERPL-MCNC: 9.4 MG/DL (ref 8.9–10.3)
CHLORIDE SERPL-SCNC: 105 MEQ/L (ref 98–109)
CO2 SERPL-SCNC: 21 MEQ/L (ref 22–32)
CREAT SERPL-MCNC: 0.8 MG/DL
GFR SERPL CREATININE-BSD FRML MDRD: >60 ML/MIN/1.73M*2
GLUCOSE SERPL-MCNC: 127 MG/DL (ref 70–99)
MAGNESIUM SERPL-MCNC: 2.1 MG/DL (ref 1.8–2.5)
P AXIS: 49
P AXIS: 51
POTASSIUM SERPL-SCNC: 4.4 MEQ/L (ref 3.6–5.1)
PR INTERVAL: 176
PR INTERVAL: 182
QRS DURATION: 72
QRS DURATION: 74
QT INTERVAL: 484
QT INTERVAL: 508
QTC CALCULATION(BAZETT): 468
QTC CALCULATION(BAZETT): 475
R AXIS: 14
R AXIS: 8
SODIUM SERPL-SCNC: 137 MEQ/L (ref 136–144)
T WAVE AXIS: 69
T WAVE AXIS: 86
VENTRICULAR RATE: 51
VENTRICULAR RATE: 58

## 2019-04-29 PROCEDURE — 21400000 HC ROOM AND CARE CCU/INTERMEDIATE

## 2019-04-29 PROCEDURE — 36415 COLL VENOUS BLD VENIPUNCTURE: CPT | Performed by: STUDENT IN AN ORGANIZED HEALTH CARE EDUCATION/TRAINING PROGRAM

## 2019-04-29 PROCEDURE — 25000000 HC PHARMACY GENERAL: Performed by: INTERNAL MEDICINE

## 2019-04-29 PROCEDURE — 27200000 HC STERILE SUPPLY: Performed by: INTERNAL MEDICINE

## 2019-04-29 PROCEDURE — 63700000 HC SELF-ADMINISTRABLE DRUG: Performed by: STUDENT IN AN ORGANIZED HEALTH CARE EDUCATION/TRAINING PROGRAM

## 2019-04-29 PROCEDURE — 99233 SBSQ HOSP IP/OBS HIGH 50: CPT | Performed by: HOSPITALIST

## 2019-04-29 PROCEDURE — C1769 GUIDE WIRE: HCPCS | Performed by: INTERNAL MEDICINE

## 2019-04-29 PROCEDURE — 63600105 HC IODINE BASED CONTRAST: Performed by: INTERNAL MEDICINE

## 2019-04-29 PROCEDURE — C1887 CATHETER, GUIDING: HCPCS | Performed by: INTERNAL MEDICINE

## 2019-04-29 PROCEDURE — B211YZZ FLUOROSCOPY OF MULTIPLE CORONARY ARTERIES USING OTHER CONTRAST: ICD-10-PCS | Performed by: INTERNAL MEDICINE

## 2019-04-29 PROCEDURE — 63700000 HC SELF-ADMINISTRABLE DRUG: Performed by: INTERNAL MEDICINE

## 2019-04-29 PROCEDURE — 63600000 HC DRUGS/DETAIL CODE

## 2019-04-29 PROCEDURE — 027036Z DILATION OF CORONARY ARTERY, ONE ARTERY WITH THREE DRUG-ELUTING INTRALUMINAL DEVICES, PERCUTANEOUS APPROACH: ICD-10-PCS | Performed by: INTERNAL MEDICINE

## 2019-04-29 PROCEDURE — 63600000 HC DRUGS/DETAIL CODE: Performed by: INTERNAL MEDICINE

## 2019-04-29 PROCEDURE — 83735 ASSAY OF MAGNESIUM: CPT | Performed by: STUDENT IN AN ORGANIZED HEALTH CARE EDUCATION/TRAINING PROGRAM

## 2019-04-29 PROCEDURE — 93458 L HRT ARTERY/VENTRICLE ANGIO: CPT | Mod: 26,XU | Performed by: INTERNAL MEDICINE

## 2019-04-29 PROCEDURE — 85730 THROMBOPLASTIN TIME PARTIAL: CPT | Performed by: STUDENT IN AN ORGANIZED HEALTH CARE EDUCATION/TRAINING PROGRAM

## 2019-04-29 PROCEDURE — C1894 INTRO/SHEATH, NON-LASER: HCPCS | Performed by: INTERNAL MEDICINE

## 2019-04-29 PROCEDURE — C1874 STENT, COATED/COV W/DEL SYS: HCPCS | Performed by: INTERNAL MEDICINE

## 2019-04-29 PROCEDURE — 99233 SBSQ HOSP IP/OBS HIGH 50: CPT | Mod: 25 | Performed by: INTERNAL MEDICINE

## 2019-04-29 PROCEDURE — 63600000 HC DRUGS/DETAIL CODE: Performed by: EMERGENCY MEDICINE

## 2019-04-29 PROCEDURE — 93458 L HRT ARTERY/VENTRICLE ANGIO: CPT | Mod: XU | Performed by: INTERNAL MEDICINE

## 2019-04-29 PROCEDURE — 99152 MOD SED SAME PHYS/QHP 5/>YRS: CPT | Performed by: INTERNAL MEDICINE

## 2019-04-29 PROCEDURE — C9600 PERC DRUG-EL COR STENT SING: HCPCS | Mod: LD | Performed by: INTERNAL MEDICINE

## 2019-04-29 PROCEDURE — 80048 BASIC METABOLIC PNL TOTAL CA: CPT | Performed by: STUDENT IN AN ORGANIZED HEALTH CARE EDUCATION/TRAINING PROGRAM

## 2019-04-29 PROCEDURE — 93010 ELECTROCARDIOGRAM REPORT: CPT | Performed by: INTERNAL MEDICINE

## 2019-04-29 PROCEDURE — 4A023N7 MEASUREMENT OF CARDIAC SAMPLING AND PRESSURE, LEFT HEART, PERCUTANEOUS APPROACH: ICD-10-PCS | Performed by: INTERNAL MEDICINE

## 2019-04-29 PROCEDURE — C1725 CATH, TRANSLUMIN NON-LASER: HCPCS | Performed by: INTERNAL MEDICINE

## 2019-04-29 PROCEDURE — 92928 PRQ TCAT PLMT NTRAC ST 1 LES: CPT | Mod: LD | Performed by: INTERNAL MEDICINE

## 2019-04-29 PROCEDURE — 99153 MOD SED SAME PHYS/QHP EA: CPT | Performed by: INTERNAL MEDICINE

## 2019-04-29 PROCEDURE — 93010 ELECTROCARDIOGRAM REPORT: CPT | Mod: 77 | Performed by: INTERNAL MEDICINE

## 2019-04-29 PROCEDURE — 85347 COAGULATION TIME ACTIVATED: CPT | Performed by: INTERNAL MEDICINE

## 2019-04-29 DEVICE — STENT RONYX25018UX RESOLUTE ONYX 2.50X18
Type: IMPLANTABLE DEVICE | Site: HEART | Status: FUNCTIONAL
Brand: RESOLUTE ONYX™

## 2019-04-29 DEVICE — STENT  RESOLUTE ONYX 2.00 X 30MM: Type: IMPLANTABLE DEVICE | Status: FUNCTIONAL

## 2019-04-29 DEVICE — STENT  RESOLUTE ONYX 2.00 X 26MM: Type: IMPLANTABLE DEVICE | Status: FUNCTIONAL

## 2019-04-29 RX ORDER — NICARDIPINE HCL-0.9% SOD CHLOR 1 MG/10 ML
SYRINGE (ML) INTRAVENOUS AS NEEDED
Status: DISCONTINUED | OUTPATIENT
Start: 2019-04-29 | End: 2019-04-29 | Stop reason: HOSPADM

## 2019-04-29 RX ORDER — FUROSEMIDE 10 MG/ML
INJECTION INTRAMUSCULAR; INTRAVENOUS AS NEEDED
Status: DISCONTINUED | OUTPATIENT
Start: 2019-04-29 | End: 2019-04-29 | Stop reason: HOSPADM

## 2019-04-29 RX ORDER — TRIMETHOBENZAMIDE HYDROCHLORIDE 300 MG/1
300 CAPSULE ORAL 3 TIMES DAILY
Status: DISCONTINUED | OUTPATIENT
Start: 2019-04-29 | End: 2019-04-30

## 2019-04-29 RX ORDER — ONDANSETRON 2 MG/ML
INJECTION INTRAMUSCULAR; INTRAVENOUS AS NEEDED
Status: DISCONTINUED | OUTPATIENT
Start: 2019-04-29 | End: 2019-04-29 | Stop reason: HOSPADM

## 2019-04-29 RX ORDER — ALUMINUM HYDROXIDE, MAGNESIUM HYDROXIDE, AND SIMETHICONE 1200; 120; 1200 MG/30ML; MG/30ML; MG/30ML
15 SUSPENSION ORAL EVERY 6 HOURS PRN
Status: DISCONTINUED | OUTPATIENT
Start: 2019-04-29 | End: 2019-05-03 | Stop reason: HOSPADM

## 2019-04-29 RX ORDER — NAPROXEN SODIUM 220 MG/1
243 TABLET, FILM COATED ORAL ONCE
Status: COMPLETED | OUTPATIENT
Start: 2019-04-29 | End: 2019-04-29

## 2019-04-29 RX ORDER — LIDOCAINE HYDROCHLORIDE 10 MG/ML
INJECTION, SOLUTION INFILTRATION; PERINEURAL AS NEEDED
Status: DISCONTINUED | OUTPATIENT
Start: 2019-04-29 | End: 2019-04-29 | Stop reason: HOSPADM

## 2019-04-29 RX ORDER — ROSUVASTATIN CALCIUM 20 MG/1
20 TABLET, COATED ORAL DAILY
Status: DISCONTINUED | OUTPATIENT
Start: 2019-04-30 | End: 2019-05-03 | Stop reason: HOSPADM

## 2019-04-29 RX ORDER — MIDAZOLAM HYDROCHLORIDE 2 MG/2ML
INJECTION, SOLUTION INTRAMUSCULAR; INTRAVENOUS AS NEEDED
Status: DISCONTINUED | OUTPATIENT
Start: 2019-04-29 | End: 2019-04-29 | Stop reason: HOSPADM

## 2019-04-29 RX ORDER — FUROSEMIDE 10 MG/ML
INJECTION INTRAMUSCULAR; INTRAVENOUS
Status: COMPLETED
Start: 2019-04-29 | End: 2019-04-29

## 2019-04-29 RX ORDER — IODIXANOL 320 MG/ML
INJECTION, SOLUTION INTRAVASCULAR AS NEEDED
Status: DISCONTINUED | OUTPATIENT
Start: 2019-04-29 | End: 2019-04-29 | Stop reason: HOSPADM

## 2019-04-29 RX ORDER — HEPARIN SODIUM 1000 [USP'U]/ML
INJECTION, SOLUTION INTRAVENOUS; SUBCUTANEOUS AS NEEDED
Status: DISCONTINUED | OUTPATIENT
Start: 2019-04-29 | End: 2019-04-29 | Stop reason: HOSPADM

## 2019-04-29 RX ORDER — FUROSEMIDE 40 MG/1
40 TABLET ORAL ONCE
Status: DISPENSED | OUTPATIENT
Start: 2019-04-29 | End: 2019-04-30

## 2019-04-29 RX ADMIN — FUROSEMIDE 40 MG: 10 INJECTION, SOLUTION INTRAMUSCULAR; INTRAVENOUS at 16:18

## 2019-04-29 RX ADMIN — METOPROLOL SUCCINATE 50 MG: 50 TABLET, EXTENDED RELEASE ORAL at 07:32

## 2019-04-29 RX ADMIN — Medication 243 MG: at 08:54

## 2019-04-29 RX ADMIN — PANTOPRAZOLE SODIUM 40 MG: 40 TABLET, DELAYED RELEASE ORAL at 07:33

## 2019-04-29 RX ADMIN — ALUMINUM HYDROXIDE, MAGNESIUM HYDROXIDE, AND SIMETHICONE 15 ML: 200; 200; 20 SUSPENSION ORAL at 03:16

## 2019-04-29 RX ADMIN — HEPARIN SODIUM 700 UNITS/HR: 10000 INJECTION, SOLUTION INTRAVENOUS at 16:19

## 2019-04-29 RX ADMIN — HEPARIN SODIUM 700 UNITS/HR: 10000 INJECTION, SOLUTION INTRAVENOUS at 02:29

## 2019-04-29 RX ADMIN — ROSUVASTATIN CALCIUM 10 MG: 10 TABLET, FILM COATED ORAL at 07:33

## 2019-04-29 RX ADMIN — ASPIRIN 81 MG: 81 TABLET, COATED ORAL at 07:33

## 2019-04-29 RX ADMIN — TRIMETHOBENZAMIDE HYDROCHLORIDE 300 MG: 300 CAPSULE ORAL at 11:33

## 2019-04-29 RX ADMIN — TICAGRELOR 90 MG: 90 TABLET ORAL at 22:43

## 2019-04-29 ASSESSMENT — NEW YORK HEART ASSOCIATION (NYHA) CLASSIFICATION: NYHA FUNCTIONAL CLASS: III

## 2019-04-29 NOTE — PROGRESS NOTES
Cardiology Progress Note    ASSESSMENT AND PLAN    1. NSTEMI  -Peak troponin 0 0.53.  Echo with moderate LV dysfunction with regional wall motion abnormality in the LAD distribution  -Cardiac cath today showed critical three-vessel coronary artery disease including 99% mid LAD stenosis and diffuse severe distal LAD atherosclerotic disease.  Poor touch down site for CABG.  -Therefore after discussing the options of CABG versus multivessel stenting, patient agreed to PCI.  -Successful stenting of the LAD mid to distal using drug-eluting stent.  -Patient will return to Cath Lab tomorrow for staged intervention of the right coronary artery and left circumflex artery.  -On aspirin.  Patient received loading dose of Brilinta.  Continue beta-blocker and statin.  -N.p.o. after midnight for staged intervention.    LV dysfunction  Echo showed moderately decreased LV systolic function.  -Continue beta-blocker.  -We will start ACE inhibitors after coronary intervention procedure tomorrow  -Patient's LVEDP was elevated at 30 in the Cath Lab.  Received Lasix 40 mg IV in the Cath Lab.     Dyslipidemia  - , TG 96, HDL 50,   -High-dose statin if she cannot tolerate.  - Goal LDL < 70 in this patient     3. Infrarenal abdominal aortic ectasia   - Stable on recent CT scan performed in Feb 2019.             SUBJECTIVE  No acute events overnight.    Patient is doing well. Pt has no complaints: no chest pain/tightness, shortness of breath, palpitations, LE swelling.    Meds:   aspirin 81 mg oral Daily   metoprolol succinate XL 50 mg oral Daily   pantoprazole 40 mg oral Daily   [START ON 4/30/2019] rosuvastatin 20 mg oral Daily   ticagrelor 90 mg oral BID   trimethobenzamide 300 mg oral TID       Review of Systems:  10 point review of systems completed and otherwise negative unless noted above in the HPI    Past medical history,active medical problems list and social history were all reviewed and updated as necessary  "today.    OBJECTIVE:   /63 (BP Location: Right upper arm, Patient Position: Lying)   Pulse (!) 58   Temp 36.5 °C (97.7 °F) (Oral)   Resp 18   Ht 1.702 m (5' 7.01\")   Wt 69.4 kg (153 lb 1 oz)   SpO2 98%   BMI 23.97 kg/m²     Intake/Output Summary (Last 24 hours) at 04/29/19 1500  Last data filed at 04/29/19 1009   Gross per 24 hour   Intake                0 ml   Output                5 ml   Net               -5 ml       Physical Exam   Constitutional: Well-developed and well-nourished. No distress.   HENT: Normocephalic and atraumatic. Moist mucous membranes.  Eyes: EOM are normal.    Neck: No JVD present. No carotid bruits.  Cardiovascular: Normal rate and regular rhythm. No murmur.  Pulmonary/Chest: Normal effort and air entry. No wheezing, rales, ronchi.  Abdominal: Normal bowel sounds. Soft, non tender.  Extremities: No lower extremity edema.  Neurological: Alert and oriented to person, place, and time.   Skin: Skin is warm and dry. No rash.  Psychiatric: Normal mood, affect and behavior.    Labs    CBC Results       04/27/19 04/26/19 10/04/16                    0500 2110 0722         WBC 7.54 6.15 11.02 (H)         RBC 4.33 4.84 3.26 (L)         HGB 13.0 14.5 9.9 (L)         HCT 38.3 43.0 30.2 (L)         MCV 88.5 88.8 92.6         MCH 30.0 30.0 30.4         MCHC 33.9 33.7 32.8          223 156                     CMP Results       04/29/19 04/28/19 04/27/19                    0650 0934 0615          138 139         K 4.4 3.8 3.7         Cl 105 104 108         CO2 21 (L) 22 21 (L)         Glucose 127 (H) 146 (H) 101 (H)         BUN 15 16 9         Creatinine 0.8 1.0 0.7         Calcium 9.4 9.4 8.7 (L)         Anion Gap 11 12 10         EGFR &gt;60.0 54.2 (L) &gt;60.0                       Results from last 7 days  Lab Units 04/28/19  0057 04/27/19  1913 04/27/19  1220   TROPONIN I ng/mL 0.50* 0.59* 0.35*       Cardiology results    TELEMETRY  Sinus rhythm    Cardiac Imaging "   TRANSTHORACIC ECHO (TTE) COMPLETE 04/27/2019    Narrative Technically difficult study.    Normal left ventricular cavity size.  Normal wall thickness.  The mid and   apical left ventricular segments are akinetic/dyskinetic..  Ejection   fraction is moderately depressed.  This is likely injury/stunning in the   LAD perfusion territory but cannot exclude stress-induced cardiomyopathy.    No obvious apical thrombus.  Impaired left ventricular relaxation.    Mild mitral valve sclerosis.  Mild left atrial dilatation.  Mild mitral   insufficiency.    Aortic valve sclerosis.   mild aortic insufficiency.    Normal right heart.       Noé Moncada MD  4/29/2019  3:00 PM

## 2019-04-29 NOTE — PROGRESS NOTES
Internal Medicine  Daily Progress Note       SUBJECTIVE   This is a 74 y.o. year-old female admitted on 2019 with NSTEMI (non-ST elevated myocardial infarction) (CMS/HCC) ([facility]) [I21.4].    Interval History: No events in the night. Having abd and chest discomfort this am, going to cath shortly. Patient reports pain more c/w heartburn that she has experienced previously. Will follow up cath results.      OBJECTIVE      Vital signs in last 24 hours:  Temp:  [36.2 °C (97.1 °F)-36.9 °C (98.5 °F)] 36.6 °C (97.9 °F)  Heart Rate:  [43-74] 60  Resp:  [14-18] 14  BP: (108-118)/(54-60) 118/58  Temp (24hrs), Av.7 °C (98 °F), Min:36.2 °C (97.1 °F), Max:36.9 °C (98.5 °F)    Intake/Output     None          PHYSICAL EXAMINATION      Physical Exam  Constitutional: She is oriented to person, place, and time. She appears well-developed and well-nourished. No distress.   HENT:   Head: Normocephalic and atraumatic.   Nose: Nose normal.   Eyes: Right eye exhibits no discharge. Left eye exhibits no discharge. No scleral icterus.   Neck: Neck supple. No JVD present.   Cardiovascular: Normal rate, regular rhythm, normal heart sounds and intact distal pulses.  Exam reveals no gallop and no friction rub.    No murmur heard.  Pulmonary/Chest: Effort normal and breath sounds normal. No respiratory distress. She has no wheezes. She has no rales. She exhibits no tenderness.   Abdominal: Soft. Bowel sounds are normal.   Musculoskeletal: She exhibits no edema.   Neurological: She is alert and oriented to person, place, and time.   Skin: Skin is warm and dry. Capillary refill takes less than 2 seconds. She is not diaphoretic.   Psychiatric: She has a normal mood and affect. Her behavior is normal. Judgment and thought content normal.   Vitals reviewed.     LINES, CATHETERS, DRAINS, AIRWAYS, AND WOUNDS   Lines, Drains, Airways, Wounds:  Peripheral IV 19 Right Antecubital (Active)   Number of days: 2       Comments:      LABS /  "IMAGING / TELE      Labs    Results from last 7 days  Lab Units 04/29/19  0650  04/26/19  2110   SODIUM mEQ/L 137  < > 138   POTASSIUM mEQ/L 4.4  < > 3.7   CHLORIDE mEQ/L 105  < > 105   CO2 mEQ/L 21*  < > 23   BUN mg/dL 15  < > 14   CREATININE mg/dL 0.8  < > 0.8   CALCIUM mg/dL 9.4  < > 9.4   ALBUMIN g/dL  --   --  4.1   BILIRUBIN TOTAL mg/dL  --   --  0.5   ALK PHOS IU/L  --   --  79   ALT IU/L  --   --  17   AST IU/L  --   --  24   GLUCOSE mg/dL 127*  < > 108*   < > = values in this interval not displayed.      Results from last 7 days  Lab Units 04/29/19  0650   MAGNESIUM mg/dL 2.1       Results from last 7 days  Lab Units 04/27/19  0500   WBC K/uL 7.54   HEMOGLOBIN g/dL 13.0   HEMATOCRIT % 38.3   PLATELETS K/uL 189         Imaging  I have independently reviewed the pertinent imaging from the last 24 hrs.    ECG/Telemetry  I have independently reviewed the ECG. Significant findings include anterolateral T wave inversions.    ASSESSMENT AND PLAN      * NSTEMI (non-ST elevated myocardial infarction) (CMS/HCC) (HCC)   Assessment & Plan    P/w progressive sob and typical chest pain  ECG non ischemic  Troponin peaked at 0.59  TTE and ECG showing LAD territory  For such minor trop spill, this is more consistent with takotsubo CM    Plan:  - Serial ECGs  - Heparin gtt   - Continue Metop succinate, Statin and Aspirin  - Cardiology consulted, appreciate recs.   - [facility] ths am, will follow up results.      GERD (gastroesophageal reflux disease)   Assessment & Plan    - Continue PPI     Hypercholesterolemia   Assessment & Plan    - Had been intolerant to many statins before due to GI intolerance and \"muscle problems\"    Plan:  - tolerating rosuvastatin 10mg it seems - had some loose stools, ok to uptitrate to 20 mg.           VTE Assessment: Padua    Code Status: Full Code  Estimated discharge date: 4/28/2019     ATTENDING DOCUMENTATION  ALSO SEE ATTENDING ATTESTATION SECTION OF NOTE                            "

## 2019-04-29 NOTE — PLAN OF CARE
Problem: Patient Care Overview  Goal: Plan of Care Review  Outcome: Ongoing (interventions implemented as appropriate)   04/29/19 0742   Coping/Psychosocial   Plan Of Care Reviewed With patient   Plan of Care Review   Progress progress towards functional goals is fair       Problem: Cardiac: ACS (Acute Coronary Syndrome) (Adult)  Goal: Signs and Symptoms of Listed Potential Problems Will be Absent, Minimized or Managed (Cardiac: ACS)  Outcome: Ongoing (interventions implemented as appropriate)

## 2019-04-29 NOTE — NURSING NOTE
Pt returned from cath lab. Pt assited into room. Pt feeling slightly dizzy and off balance. Strongly encouraged to use call bell for all assistance. Bed alarm on. Pts  at bedside. Pt's vitals stable. Continuing to monitor closely. Pt feeling nauseous. Dr. Caal pagejarret for anti-emetic order. Pt's TR band to be decreased starting at 12:15. Heparin gtt to be restarted two hours post TR band removal. Pt resting in bed and generally not feeling well. Emotional support given. Call bell in reach. Continuing to monitor.

## 2019-04-29 NOTE — PLAN OF CARE
Problem: Patient Care Overview  Goal: Individualization & Mutuality   04/28/19 0047   Individualization   Patient Specific Preferences prefers to be called Jennifer   Patient Specific Goals to be discharged   Patient Specific Interventions care clustered, awakenings minimized       Problem: Cardiac: ACS (Acute Coronary Syndrome) (Adult)  Goal: Signs and Symptoms of Listed Potential Problems Will be Absent, Minimized or Managed (Cardiac: ACS)  Outcome: Ongoing (interventions implemented as appropriate)   04/28/19 4202   Cardiac: ACS (Acute Coronary Syndrome)   Problems Assessed (Acute Coronary Syndrome) all   Problems Present (Acute Coronary Syndrome) none

## 2019-04-29 NOTE — PLAN OF CARE
Problem: Patient Care Overview  Goal: Discharge Needs Assessment  Outcome: Ongoing (interventions implemented as appropriate)  Discharge plan discussed at care progression rounds and with patient.  Patient in being treated for a NSTEMI, s/p cath lab.  CLEMENCIA Wednesday, home with no needs.  Patient is independent and lives in a 3 story house alone. PCP and Pharmacy verified. Patient states that her friend Dick will be able to transport at discharge. Will continue to follow for transition of care needs until discharge is completed.    04/29/19 1456   OR Needs Assessment   Concerns To Be Addressed no discharge needs identified   Readmission Within The Last 30 Days no previous admission in last 30 days   Provider Choice List(s) Given no   Anticipated Discharge Disposition home without services   Equipment Needed After Discharge none   Activity/Self Care ROS   Equipment Currently Used at Home none

## 2019-04-29 NOTE — NURSING NOTE
TR band fully removed without any sight of complication. Activity restrictions reinforced. Pt now feeling very very tired and wishing to sleep. Continuing to monitor closely. Remains on 2L but no current complaints of extreme SOB.

## 2019-04-29 NOTE — PRE-SEDATION DOCUMENTATION
Sedation Plan    ASA 3     Mallampati class: II.    Risks, benefits, and alternatives discussed with patient.

## 2019-04-29 NOTE — NURSING NOTE
Pt is having a hard time breathing. Subjective feeling of being unable to catch breath. Sats okay. 98%. Placed on 2L. Dr.Barry dennis.

## 2019-04-29 NOTE — PATIENT CARE CONFERENCE
Care Progression Rounds Note  Date: 4/29/2019  Time: 11:46 AM     Patient Name: Jennifer Sams     Medical Record Number: [id number]   YOB: 1945  Sex: Female      Room/Bed: 0164    Admitting Diagnosis: NSTEMI (non-ST elevated myocardial infarction) (CMS/HCC) ([facility]) [I21.4]   Admit Date/Time: 4/26/2019  9:57 PM    Primary Diagnosis: NSTEMI (non-ST elevated myocardial infarction) (CMS/HCC) ([facility])  Principal Problem: NSTEMI (non-ST elevated myocardial infarction) (CMS/HCC) ([facility])    GMLOS: 1.8  Anticipated Discharge Date: 5/1/2019    AM-PAC  Mobility Score: 24    Discharge Planning:  Concerns To Be Addressed: no discharge needs identified  Anticipated Discharge Disposition: home without services    Barriers to Discharge:  Barriers to Discharge: Medical issues not resolved    Participants:  physician, , social work/services, nursing, physical therapy

## 2019-04-29 NOTE — NURSING NOTE
Pt c/o 8 out 10 chest pain. Obtained EKG. Paged Dr. Ceja. Awaiting response. Will continue to monitor.

## 2019-04-29 NOTE — PRE-PROCEDURE NOTE
Cardiac Cath Lab Pre-procedure Note    - Patient was seen and examined at bedside.  - The patient's chart and all data was reviewed.  - The procedure, treatment alternatives, risks and benefits were explained with specific risks discussed.  - Patient was consented for cardiac cath procedure and possible PCI.  - Patient's case was found appropriate for dual antiplatelet therapy.    Patient's clinical presentation to the cardiac cath lab: NSTEMI.     Patient is at risk for bleeding.       Notable Non-Invasive Cardiac Testing  - Echocardiogram: intermediate risk (LVEF 35-49% not explained by a non-coronary cause)       Total anti-anginal medications: 1.     Patient is presenting today with NYHA class III acute systolic heart failure.

## 2019-04-30 LAB
ANION GAP SERPL CALC-SCNC: 14 MEQ/L (ref 3–15)
APTT PPP: 93 SEC (ref 23–35)
BUN SERPL-MCNC: 13 MG/DL (ref 8–20)
CALCIUM SERPL-MCNC: 9.7 MG/DL (ref 8.9–10.3)
CHLORIDE SERPL-SCNC: 99 MEQ/L (ref 98–109)
CO2 SERPL-SCNC: 22 MEQ/L (ref 22–32)
CREAT SERPL-MCNC: 1.1 MG/DL
ERYTHROCYTE [DISTWIDTH] IN BLOOD BY AUTOMATED COUNT: 13.2 % (ref 11.7–14.4)
GFR SERPL CREATININE-BSD FRML MDRD: 48.6 ML/MIN/1.73M*2
GLUCOSE SERPL-MCNC: 122 MG/DL (ref 70–99)
HCT VFR BLDCO AUTO: 47 %
HGB BLD-MCNC: 16.3 G/DL
MAGNESIUM SERPL-MCNC: 2.1 MG/DL (ref 1.8–2.5)
MCH RBC QN AUTO: 30.5 PG (ref 28–33.2)
MCHC RBC AUTO-ENTMCNC: 34.7 G/DL (ref 32.2–35.5)
MCV RBC AUTO: 87.9 FL (ref 83–98)
PDW BLD AUTO: 9.3 FL (ref 9.4–12.3)
PLATELET # BLD AUTO: 259 K/UL
POCT ACT-LR: 335 SEC (ref 113–149)
POCT TEST: ABNORMAL
POTASSIUM SERPL-SCNC: 3.8 MEQ/L (ref 3.6–5.1)
RBC # BLD AUTO: 5.35 M/UL (ref 3.93–5.22)
SODIUM SERPL-SCNC: 135 MEQ/L (ref 136–144)
WBC # BLD AUTO: 10.64 K/UL

## 2019-04-30 PROCEDURE — C1760 CLOSURE DEV, VASC: HCPCS | Performed by: INTERNAL MEDICINE

## 2019-04-30 PROCEDURE — C1725 CATH, TRANSLUMIN NON-LASER: HCPCS | Performed by: INTERNAL MEDICINE

## 2019-04-30 PROCEDURE — C9600 PERC DRUG-EL COR STENT SING: HCPCS | Mod: RC | Performed by: INTERNAL MEDICINE

## 2019-04-30 PROCEDURE — 85730 THROMBOPLASTIN TIME PARTIAL: CPT | Performed by: STUDENT IN AN ORGANIZED HEALTH CARE EDUCATION/TRAINING PROGRAM

## 2019-04-30 PROCEDURE — 99153 MOD SED SAME PHYS/QHP EA: CPT | Performed by: INTERNAL MEDICINE

## 2019-04-30 PROCEDURE — C1894 INTRO/SHEATH, NON-LASER: HCPCS | Performed by: INTERNAL MEDICINE

## 2019-04-30 PROCEDURE — 027037Z DILATION OF CORONARY ARTERY, ONE ARTERY WITH FOUR OR MORE DRUG-ELUTING INTRALUMINAL DEVICES, PERCUTANEOUS APPROACH: ICD-10-PCS | Performed by: INTERNAL MEDICINE

## 2019-04-30 PROCEDURE — 99233 SBSQ HOSP IP/OBS HIGH 50: CPT | Mod: 25 | Performed by: INTERNAL MEDICINE

## 2019-04-30 PROCEDURE — 21400000 HC ROOM AND CARE CCU/INTERMEDIATE

## 2019-04-30 PROCEDURE — C1874 STENT, COATED/COV W/DEL SYS: HCPCS | Performed by: INTERNAL MEDICINE

## 2019-04-30 PROCEDURE — 83735 ASSAY OF MAGNESIUM: CPT | Performed by: STUDENT IN AN ORGANIZED HEALTH CARE EDUCATION/TRAINING PROGRAM

## 2019-04-30 PROCEDURE — 63600000 HC DRUGS/DETAIL CODE: Performed by: INTERNAL MEDICINE

## 2019-04-30 PROCEDURE — 63700000 HC SELF-ADMINISTRABLE DRUG: Performed by: STUDENT IN AN ORGANIZED HEALTH CARE EDUCATION/TRAINING PROGRAM

## 2019-04-30 PROCEDURE — 99152 MOD SED SAME PHYS/QHP 5/>YRS: CPT | Performed by: INTERNAL MEDICINE

## 2019-04-30 PROCEDURE — 63700000 HC SELF-ADMINISTRABLE DRUG: Performed by: INTERNAL MEDICINE

## 2019-04-30 PROCEDURE — 99232 SBSQ HOSP IP/OBS MODERATE 35: CPT | Performed by: HOSPITALIST

## 2019-04-30 PROCEDURE — 36415 COLL VENOUS BLD VENIPUNCTURE: CPT | Performed by: STUDENT IN AN ORGANIZED HEALTH CARE EDUCATION/TRAINING PROGRAM

## 2019-04-30 PROCEDURE — C1887 CATHETER, GUIDING: HCPCS | Performed by: INTERNAL MEDICINE

## 2019-04-30 PROCEDURE — 92928 PRQ TCAT PLMT NTRAC ST 1 LES: CPT | Mod: RC | Performed by: INTERNAL MEDICINE

## 2019-04-30 PROCEDURE — 85347 COAGULATION TIME ACTIVATED: CPT | Performed by: INTERNAL MEDICINE

## 2019-04-30 PROCEDURE — 85027 COMPLETE CBC AUTOMATED: CPT | Performed by: STUDENT IN AN ORGANIZED HEALTH CARE EDUCATION/TRAINING PROGRAM

## 2019-04-30 PROCEDURE — 63600105 HC IODINE BASED CONTRAST: Mod: JW | Performed by: INTERNAL MEDICINE

## 2019-04-30 PROCEDURE — B211YZZ FLUOROSCOPY OF MULTIPLE CORONARY ARTERIES USING OTHER CONTRAST: ICD-10-PCS | Performed by: INTERNAL MEDICINE

## 2019-04-30 PROCEDURE — 25000000 HC PHARMACY GENERAL: Performed by: INTERNAL MEDICINE

## 2019-04-30 PROCEDURE — 82310 ASSAY OF CALCIUM: CPT | Performed by: STUDENT IN AN ORGANIZED HEALTH CARE EDUCATION/TRAINING PROGRAM

## 2019-04-30 PROCEDURE — 27200000 HC STERILE SUPPLY: Performed by: INTERNAL MEDICINE

## 2019-04-30 PROCEDURE — C1769 GUIDE WIRE: HCPCS | Performed by: INTERNAL MEDICINE

## 2019-04-30 DEVICE — STENT  RESOLUTE ONYX 2.00 X 15MM: Type: IMPLANTABLE DEVICE | Status: FUNCTIONAL

## 2019-04-30 DEVICE — PERCLOSE PROGLIDE™ SUTURE-MEDIATED CLOSURE SYSTEM
Type: IMPLANTABLE DEVICE | Site: GROIN | Status: FUNCTIONAL
Brand: PERCLOSE PROGLIDE™

## 2019-04-30 DEVICE — STENT RONYX30015UX RESOLUTE ONYX 3.00X15
Type: IMPLANTABLE DEVICE | Site: CORONARY | Status: FUNCTIONAL
Brand: RESOLUTE ONYX™

## 2019-04-30 DEVICE — STENT  RESOLUTE ONYX 2.00 X 22MM: Type: IMPLANTABLE DEVICE | Site: CORONARY | Status: FUNCTIONAL

## 2019-04-30 RX ORDER — ONDANSETRON 2 MG/ML
INJECTION INTRAMUSCULAR; INTRAVENOUS AS NEEDED
Status: DISCONTINUED | OUTPATIENT
Start: 2019-04-30 | End: 2019-04-30 | Stop reason: HOSPADM

## 2019-04-30 RX ORDER — ACETAMINOPHEN 325 MG/1
650 TABLET ORAL ONCE AS NEEDED
Status: COMPLETED | OUTPATIENT
Start: 2019-04-30 | End: 2019-05-02

## 2019-04-30 RX ORDER — MIDAZOLAM HYDROCHLORIDE 2 MG/2ML
INJECTION, SOLUTION INTRAMUSCULAR; INTRAVENOUS AS NEEDED
Status: DISCONTINUED | OUTPATIENT
Start: 2019-04-30 | End: 2019-04-30 | Stop reason: HOSPADM

## 2019-04-30 RX ORDER — HEPARIN SODIUM 1000 [USP'U]/ML
INJECTION, SOLUTION INTRAVENOUS; SUBCUTANEOUS AS NEEDED
Status: DISCONTINUED | OUTPATIENT
Start: 2019-04-30 | End: 2019-04-30 | Stop reason: HOSPADM

## 2019-04-30 RX ORDER — LIDOCAINE HYDROCHLORIDE 10 MG/ML
INJECTION, SOLUTION INFILTRATION; PERINEURAL AS NEEDED
Status: DISCONTINUED | OUTPATIENT
Start: 2019-04-30 | End: 2019-04-30 | Stop reason: HOSPADM

## 2019-04-30 RX ORDER — BENZONATATE 100 MG/1
100 CAPSULE ORAL 3 TIMES DAILY PRN
Status: DISCONTINUED | OUTPATIENT
Start: 2019-04-30 | End: 2019-05-03 | Stop reason: HOSPADM

## 2019-04-30 RX ORDER — TRIMETHOBENZAMIDE HYDROCHLORIDE 300 MG/1
300 CAPSULE ORAL 3 TIMES DAILY PRN
Status: DISCONTINUED | OUTPATIENT
Start: 2019-04-30 | End: 2019-05-03 | Stop reason: HOSPADM

## 2019-04-30 RX ORDER — IODIXANOL 320 MG/ML
INJECTION, SOLUTION INTRAVASCULAR AS NEEDED
Status: DISCONTINUED | OUTPATIENT
Start: 2019-04-30 | End: 2019-04-30 | Stop reason: HOSPADM

## 2019-04-30 RX ORDER — OXYMETAZOLINE HCL 0.05 %
2 SPRAY, NON-AEROSOL (ML) NASAL 2 TIMES DAILY PRN
Status: DISCONTINUED | OUTPATIENT
Start: 2019-04-30 | End: 2019-05-03 | Stop reason: HOSPADM

## 2019-04-30 RX ADMIN — ASPIRIN 81 MG: 81 TABLET, COATED ORAL at 08:24

## 2019-04-30 RX ADMIN — PANTOPRAZOLE SODIUM 40 MG: 40 TABLET, DELAYED RELEASE ORAL at 08:26

## 2019-04-30 RX ADMIN — TICAGRELOR 90 MG: 90 TABLET ORAL at 20:20

## 2019-04-30 RX ADMIN — ROSUVASTATIN CALCIUM 20 MG: 20 TABLET, FILM COATED ORAL at 17:48

## 2019-04-30 RX ADMIN — TICAGRELOR 90 MG: 90 TABLET ORAL at 08:24

## 2019-04-30 RX ADMIN — METOPROLOL SUCCINATE 50 MG: 50 TABLET, EXTENDED RELEASE ORAL at 08:23

## 2019-04-30 RX ADMIN — OXYMETAZOLINE HCL 2 SPRAY: 0.05 SPRAY NASAL at 17:45

## 2019-04-30 NOTE — POST-PROCEDURE NOTE
Left groin site clean dry and intact.  Patient readied for transfer to [facility].  Report faxed

## 2019-04-30 NOTE — PROGRESS NOTES
Internal Medicine  Daily Progress Note       SUBJECTIVE   This is a 74 y.o. year-old female admitted on 2019 with NSTEMI (non-ST elevated myocardial infarction) (CMS/HCC) ([facility]) [I21.4].    Interval History: No events in the night. Having abd and chest discomfort this am, going to cath shortly. Will follow up cath results. Likely discharge tomorrow.      OBJECTIVE      Vital signs in last 24 hours:  Temp:  [36.5 °C (97.7 °F)-37 °C (98.6 °F)] 36.6 °C (97.9 °F)  Heart Rate:  [55-92] 74  Resp:  [12-30] 22  BP: ()/(56-72) 118/72  Temp (24hrs), Av.7 °C (98 °F), Min:36.5 °C (97.7 °F), Max:37 °C (98.6 °F)    Intake/Output     None          PHYSICAL EXAMINATION      Physical Exam  Constitutional: She is oriented to person, place, and time. She appears well-developed and well-nourished. No distress.   HENT:   Head: Normocephalic and atraumatic.   Nose: Nose normal.   Eyes: Right eye exhibits no discharge. Left eye exhibits no discharge. No scleral icterus.   Neck: Neck supple. No JVD present.   Cardiovascular: Normal rate, regular rhythm, normal heart sounds and intact distal pulses.  Exam reveals no gallop and no friction rub.    No murmur heard.  Pulmonary/Chest: Effort normal and breath sounds normal. No respiratory distress. She has no wheezes. She has no rales. She exhibits no tenderness.   Abdominal: Soft. Bowel sounds are normal.   Musculoskeletal: She exhibits no edema.   Neurological: She is alert and oriented to person, place, and time.   Skin: Skin is warm and dry. Capillary refill takes less than 2 seconds. She is not diaphoretic.   Psychiatric: She has a normal mood and affect. Her behavior is normal. Judgment and thought content normal.   Vitals reviewed.     LINES, CATHETERS, DRAINS, AIRWAYS, AND WOUNDS   Lines, Drains, Airways, Wounds:  Peripheral IV 19 Right Antecubital (Active)   Number of days: 2       Comments:      LABS / IMAGING / TELE      Labs    Results from last 7 days  Lab  "Units 04/30/19  0816  04/26/19  2110   SODIUM mEQ/L 135*  < > 138   POTASSIUM mEQ/L 3.8  < > 3.7   CHLORIDE mEQ/L 99  < > 105   CO2 mEQ/L 22  < > 23   BUN mg/dL 13  < > 14   CREATININE mg/dL 1.1  < > 0.8   CALCIUM mg/dL 9.7  < > 9.4   ALBUMIN g/dL  --   --  4.1   BILIRUBIN TOTAL mg/dL  --   --  0.5   ALK PHOS IU/L  --   --  79   ALT IU/L  --   --  17   AST IU/L  --   --  24   GLUCOSE mg/dL 122*  < > 108*   < > = values in this interval not displayed.      Results from last 7 days  Lab Units 04/30/19  0816   MAGNESIUM mg/dL 2.1       Results from last 7 days  Lab Units 04/30/19  0816   WBC K/uL 10.64*   HEMOGLOBIN g/dL 16.3*   HEMATOCRIT % 47.0*   PLATELETS K/uL 259         Imaging  I have independently reviewed the pertinent imaging from the last 24 hrs.    ECG/Telemetry  I have independently reviewed the ECG. Significant findings include anterolateral T wave inversions.    ASSESSMENT AND PLAN      * NSTEMI (non-ST elevated myocardial infarction) (CMS/HCC) ([facility])   Assessment & Plan    P/w progressive sob and typical chest pain  ECG non ischemic  Troponin peaked at 0.59  TTE and ECG showing LAD territory  [facility] with diffuse disease, LAD received FRANK as thought to be the culprit lesion. Not amenable to CABG given no touchdown sites.     Plan:  - Serial ECGs  - Heparin gtt, discontinue after cath.   - Continue Metop succinate, Statin and Aspirin  - Cardiology consulted, appreciate recs.   - [facility] this am for staged FRAKN to RCA and LCx.      GERD (gastroesophageal reflux disease)   Assessment & Plan    - Continue PPI     Hypercholesterolemia   Assessment & Plan    - Had been intolerant to many statins before due to GI intolerance and \"muscle problems\"    Plan:  - tolerating rosuvastatin 20 mg.           VTE Assessment: Padua    Code Status: Full Code  Estimated discharge date: 5/1/2019     ATTENDING DOCUMENTATION  ALSO SEE ATTENDING ATTESTATION SECTION OF NOTE                              "

## 2019-04-30 NOTE — NURSING NOTE
Pt OOB to chair.  L groin site CDI with no swelling or hematoma noted.  Pt's nose bleeding worsening.  Dr santosh vela aware.  Afrin ordered and ice pack applied to bridge of nose.  Call bell in reach will continue to monitor

## 2019-04-30 NOTE — DISCHARGE SUMMARY
Internal Medicine  Inpatient Discharge Summary        BRIEF OVERVIEW   Admitting Provider: Laura Mercer DO  Attending Provider: Nahed Solitario MD Attending phys phone: [phone]    PCP: Anitra Way [phone]    Admission Date: 4/26/2019  Discharge Date: 5/3/2019     DISCHARGE DIAGNOSES      Primary Discharge Diagnosis  NSTEMI (non-ST elevated myocardial infarction) (CMS/HCC) ([facility])    Secondary Discharge Diagnoses  Active Hospital Problems    Diagnosis Date Noted   • NSTEMI (non-ST elevated myocardial infarction) (CMS/HCC) ([facility]) 04/26/2019     Priority: High   • Hypercholesterolemia 04/19/2019   • GERD (gastroesophageal reflux disease) 04/19/2019      Resolved Hospital Problems    Diagnosis Date Noted Date Resolved   No resolved problems to display.       Active Problem List on Day of Discharge  Patient Active Problem List   Diagnosis   • Hypercholesterolemia   • Pseudoclaudication syndrome   • History of rheumatic fever as a child   • Glaucoma   • RSD (reflex sympathetic dystrophy)   • Raynaud's disease   • Osteoarthritis of multiple joints   • History of hip replacement, total, right   • GERD (gastroesophageal reflux disease)   • Elevated blood pressure reading without diagnosis of hypertension   • Abdominal aortic aneurysm (AAA) without rupture (CMS/[facility])   • NSTEMI (non-ST elevated myocardial infarction) (CMS/HCC) ([facility])     SUMMARY OF HOSPITALIZATION      Presenting Problem/History of Present Illness  NSTEMI (non-ST elevated myocardial infarction) (CMS/[facility]) ([facility])    This is a 74 y.o. female with a past medical history of Glaucoma, Hiatal hernia, AAA, HLD and rheumatic fever as a child who presents with chest pain.     History was taken from patient bedside. Patient was recently seen by Dr. Courtney for progressive SOB over the past several months. She was prescribed Rosuvastatin, Metop succinate and Nitro. She was already taking Aspirin. Patient was planned to get CT angiogram coronary  however she presented with chest pressure that improved with nitro.     Hospital Course    In the emergency department the patient was hemodynamically stable.  Patient had no further evidence of chest pain and no shortness of breath at rest.  EKG was performed and revealed normal sinus rhythm without any evidence of ischemic changes.  Troponin in the emergency room was 0.12.  She was evaluated by the cardiology team for suspected an STEMI.  She was started on aspirin load and continued with aspirin.  She was also started on statin and metoprolol succinate.  Echocardiogram was performed and revealed normal left ventricular cavity size and normal wall thickness.  The mid and apical left ventricular segments were akinetic.  Ejection fraction was moderately depressed at 45%.  It was thought that this is most likely secondary to the LAD perfusion territory although stress-induced cardiomyopathy could not be excluded.  The patient went cardiac catheterization on 4/29/2019.  The patient had significant triple-vessel disease with 95 to 99% mid LAD stenosis and a long 80% mid to distal LAD stenosis.  Additionally 90% proximal, 90% mid and 90-95% distal RCA lesions were noted.  A 95 to 99% proximal to mid OM 2 stenosis was noted.  Elevated LVEDP pressures at 23 mmHg were seen.  The patient was given 40 mg of IV Lasix to assist with mild respiratory distress that was encountered during catheterization.  2 mid and distal LAD stents were placed during this procedure.  Staging drug-eluting stent procedure was performed again on 4/30/2019.  3 drug-eluting stents were placed at that time.  A 3rd staging drug-eluting stent procedure was performed on 5/2/2019 in which 1 drug-eluting stents were placed within the OM2.  The patient tolerated all of the procedures very well without any complication.  Kidney function remained stable after the procedures and she was able to be discharged the following day on aspirin and Brilinta.  The  patient will continue with Brilinta for 1 month after the procedure and then will be converted to Plavix as an outpatient and Dr. Moncada's office.  Additionally the patient will continue on high intensity statin as well as metoprolol succinate.  Kidney function was stable enough after the procedure to begin lisinopril 2.5 mg at the time of discharge.  She will need repeat echocardiogram 3 months after discharge.    Exam on Day of Discharge  Physical Exam  Constitutional: She is oriented to person, place, and time. She appears well-developed and well-nourished. No distress.   HENT:   Head: Normocephalic and atraumatic.   Nose: Nose normal.   Eyes: Right eye exhibits no discharge. Left eye exhibits no discharge. No scleral icterus.   Neck: Neck supple. No JVD present.   Cardiovascular: Normal rate, regular rhythm, normal heart sounds and intact distal pulses.  Exam reveals no gallop and no friction rub.    No murmur heard.  Pulmonary/Chest: Effort normal and breath sounds normal. No respiratory distress. She has no wheezes. She has no rales. She exhibits no tenderness.   Abdominal: Soft. Bowel sounds are normal.   Musculoskeletal: She exhibits no edema.   Neurological: She is alert and oriented to person, place, and time.   Skin: Skin is warm and dry. Capillary refill takes less than 2 seconds. She is not diaphoretic.   Psychiatric: She has a normal mood and affect. Her behavior is normal. Judgment and thought content normal.   Vitals reviewed.    Consults During Admission  IP CONSULT TO CARDIOLOGY  IP CONSULT TO CARDIAC REHAB  IP CONSULT TO NUTRITION SERVICES    DISCHARGE MEDICATIONS   New, changed, or stopped medications from this admission:       Medication List      START taking these medications    lisinopril 2.5 mg tablet  Commonly known as:  PRINIVIL  Take 1 tablet (2.5 mg total) by mouth daily.  Dose:  2.5 mg     ticagrelor 90 mg tablet  Commonly known as:  BRILINTA  Take 1 tablet (90 mg total) by mouth 2 (two)  times a day.  Dose:  90 mg        CHANGE how you take these medications    metoprolol succinate XL 25 mg 24 hr tablet  Commonly known as:  TOPROL-XL  Take 1 tablet (25 mg total) by mouth daily.  Dose:  25 mg  What changed:  · medication strength  · how much to take     rosuvastatin 20 mg tablet  Commonly known as:  CRESTOR  Take 1 tablet (20 mg total) by mouth daily.  Dose:  20 mg  What changed:  · medication strength  · how much to take        CONTINUE taking these medications    aspirin 81 mg enteric coated tablet  Commonly known as:  ASPIR-81  Take 1 tablet (81 mg total) by mouth daily.  Dose:  81 mg     multivitamin tablet  Take 1 tablet by mouth daily.  Dose:  1 tablet     nitroglycerin 0.4 mg SL tablet  Commonly known as:  NITROSTAT  Place 1 tablet (0.4 mg total) under the tongue every 5 (five) minutes as needed for chest pain.  Dose:  0.4 mg     pantoprazole 40 mg EC tablet  Commonly known as:  PROTONIX  Take 40 mg by mouth daily.  Dose:  40 mg        STOP taking these medications    ALEVE 220 mg tablet  Generic drug:  naproxen sodium     ALLERGY COMPLETE-D ORAL            Non-Medication orders at discharge:   Instructions for after discharge     Call provider for:  difficulty breathing, headache or visual disturbances       Call provider for:  extreme fatigue       Call provider for:  persistent dizziness or light-headedness       Call provider for:  persistent nausea or vomiting       Call provider for:  rash       Call provider for:  redness, tenderness, or signs of infection (pain, swelling, redness, odor or green/yellow discharge around incision site)       Call provider for:  severe uncontrolled pain       Call provider for:  temperature >100.4       Diet       Diet Type:  Cardiac (2gm Sodium/Low Fat)    Fluid restriction       Follow-up with Provider:       Instructions for follow-up:  Please follow up with your cardiologist within 2 weeks of discharge.    Follow-up with Provider:       Instructions  for follow-up:  Please follow up with Dr. Moncada who placed your stents within 1 month of discharge to convert your brilinta to plavix.    Follow-up with primary physician (PCP)       Instructions for follow-up:  Please follow up with your PCP within 2 weeks of your discharge.    Post-Discharge Activity: Normal activity as tolerated.       Normal activity as tolerated.                PROCEDURES / LABS / IMAGING      Operative Procedures  None    Other Procedures  [facility] (4/29/2019)  [facility] (4/30/2019)  [facility] (5/2/2019)    Pertinent Labs    Results from last 7 days  Lab Units 05/03/19  0908 05/02/19  0814 05/01/19  1408  04/26/19  2110   SODIUM mEQ/L 138 137 135*  < > 138   POTASSIUM mEQ/L 4.1 4.2 3.7  < > 3.7   CHLORIDE mEQ/L 104 104 100  < > 105   CO2 mEQ/L 21* 22 22  < > 23   BUN mg/dL 11 11 15  < > 14   CREATININE mg/dL 0.8 1.0 1.0  < > 0.8   CALCIUM mg/dL 8.9 9.2 9.4  < > 9.4   ALBUMIN g/dL  --   --   --   --  4.1   BILIRUBIN TOTAL mg/dL  --   --   --   --  0.5   ALK PHOS IU/L  --   --   --   --  79   ALT IU/L  --   --   --   --  17   AST IU/L  --   --   --   --  24   GLUCOSE mg/dL 103* 109* 163*  < > 108*   < > = values in this interval not displayed.    Results from last 7 days  Lab Units 05/03/19 0908 05/02/19  0814 05/01/19  1408  04/26/19  2110   WBC K/uL 7.21 7.90 9.10  < > 6.15   HEMOGLOBIN g/dL 13.8 14.3 14.7  < > 14.5   HEMATOCRIT % 40.5 41.8 43.2  < > 43.0   PLATELETS K/uL 232 229 223  < > 223   DIFF TYPE   --   --   --   --  Auto   NRBC %  --   --   --   --  0.0   IMM GRANULOCYTES %  --   --   --   --  0.5   NEUTROPHILS %  --   --   --   --  60.8   LYMPHOCYTES %  --   --   --   --  27.8   MONOCYTES %  --   --   --   --  8.6   EOSINOPHILS %  --   --   --   --  1.5   BASOPHILS %  --   --   --   --  0.8   IMM GRANUCOCYTES ABS K/uL  --   --   --   --  0.03   MONO ABS AUTO K/uL  --   --   --   --  0.53   EOS ABS AUTO K/uL  --   --   --   --  0.09   BASO ABS AUTO K/uL  --   --   --   --  0.05   < > = values in this  interval not displayed.    Troponin I Results       04/28/19 04/27/19 04/27/19                    0057 1913 1220         Troponin I 0.50 (HH) 0.59 (HH) 0.35 (H)         Comment for Troponin I at 0057 on 04/28/19:  Consistent with previous results      Comment for Troponin I at 1220 on 04/27/19:  Result requires test to be repeated on new specimen 4-6 hours after the original.          Pertinent Imaging  X-ray Chest 2 Views    Result Date: 4/27/2019  IMPRESSION: No evidence of acute pulmonary disease..  Small hiatal hernia.       OUTPATIENT  FOLLOW-UP / REFERRALS / PENDING TESTS      Outpatient Follow-Up Appointments  Encounter Information     You do not currently have any appointments scheduled.          Referrals  No orders of the defined types were placed in this encounter.      Test Results Pending at Discharge  Unresulted Labs          Important Issues to Address in Follow-Up  Follow up with cards for risk factor modification/ post stent follow up.   Brilinta to plavix after discharge.   DISCHARGE DISPOSITION      Disposition: Home     Code Status At Discharge: Full Code    Physician Order for Life-Sustaining Treatment Document Status      No documents found                 ATTENDING DOCUMENTATION  ALSO SEE ATTENDING ATTESTATION SECTION OF NOTE

## 2019-04-30 NOTE — PRE-PROCEDURE NOTE
Cardiac Cath Lab Pre-procedure Note    - Patient was seen and examined at bedside.  - The patient's chart and all data was reviewed.  - The procedure, treatment alternatives, risks and benefits were explained with specific risks discussed.  - Patient was consented for cardiac cath procedure and possible PCI.  - Patient's case was found appropriate for dual antiplatelet therapy.    Patient's clinical presentation to the cardiac cath lab: staged PCI.     Patient is at risk for acute myocardial infarction.   Patient appears to be managing well.

## 2019-04-30 NOTE — PROGRESS NOTES
Discharge plan discussed at care progression rounds and with patient.  Brilinta was priced for co-pay and it is $400.00. Coupon was give for the free trial for 30 days. Patient does have [employer] medicare part D, unable to use the co-pay card, physicians are aware. Patient assistance program ted supplied.. CLEMENCIA planned for tomorrow.  Will continue to follow for transition of care needs until discharge is completed

## 2019-04-30 NOTE — PLAN OF CARE
Problem: Cardiac: ACS (Acute Coronary Syndrome) (Adult)  Goal: Signs and Symptoms of Listed Potential Problems Will be Absent, Minimized or Managed (Cardiac: ACS)  Outcome: Ongoing (interventions implemented as appropriate)   04/30/19 0028   Cardiac: ACS (Acute Coronary Syndrome)   Problems Assessed (Acute Coronary Syndrome) all   Problems Present (Acute Coronary Syndrome) none

## 2019-04-30 NOTE — NURSING NOTE
Pt assessed in bed. Aaox3, denies  Pain. HR on mon 76 nsr, vss. Offers no complaints. Heparin gtt at 700 units per nomogram. Bed alarm on call bell in reach will cont to mon.

## 2019-04-30 NOTE — PROGRESS NOTES
Cardiology Progress Note    ASSESSMENT AND PLAN     NSTEMI  -Peak troponin 0 0.53.  Echo with moderate LV dysfunction with regional wall motion abnormality in the LAD distribution  -Cardiac cath on 4/29/19 showed critical three-vessel coronary artery disease including 99% mid LAD stenosis and diffuse severe distal LAD atherosclerotic disease.  Poor touch down site for CABG.  -Therefore, after discussing the options of CABG versus multivessel stenting, patient agreed to PCI.  -Successful stenting of the LAD mid to distal using drug-eluting stent on 4/29/19  -Successful complex PCI of the right coronary artery today on April 30 with excellent angiographic result.  -Patient will return to Cath Lab on May 2 for the final staged intervention of the left circumflex artery.  -Continue aspirin and Brilinta for now.  Brilinta can be changed to Plavix after a month if there is issue with insurance coverage.  -continue beta-blocker and statin.     LV dysfunction  Echo showed moderately decreased LV systolic function.  -Continue beta-blocker.  -We will start ACE inhibitors before discharge after completion of staged intervention.  -Patient's LVEDP was elevated at 30 in the Cath Lab on April 29.   -Monitor volume status, may need diuresis if clinically warranted.     Dyslipidemia  - , TG 96, HDL 50,   -High-dose statin if she can tolerate.  - Goal LDL < 70 in this patient     3. Infrarenal abdominal aortic ectasia   - Stable on recent CT scan performed in Feb 2019.        SUBJECTIVE  No acute events overnight.    Patient is doing well. Pt has no complaints: no chest pain/tightness, shortness of breath, palpitations, LE swelling.    Meds:   aspirin 81 mg oral Daily   metoprolol succinate XL 50 mg oral Daily   pantoprazole 40 mg oral Daily   rosuvastatin 20 mg oral Daily   ticagrelor 90 mg oral BID       Review of Systems:  10 point review of systems completed and otherwise negative unless noted above in the  "HPI    Past medical history,active medical problems list and social history were all reviewed and updated as necessary today.    OBJECTIVE:   /74 (BP Location: Right upper arm, Patient Position: Lying)   Pulse 64   Temp 36.3 °C (97.4 °F) (Oral)   Resp 18   Ht 1.702 m (5' 7.01\")   Wt 66.1 kg (145 lb 11.2 oz)   SpO2 97%   BMI 22.81 kg/m²     Intake/Output Summary (Last 24 hours) at 04/30/19 1625  Last data filed at 04/30/19 1151   Gross per 24 hour   Intake                0 ml   Output               10 ml   Net              -10 ml       Physical Exam   Constitutional: Well-developed and well-nourished. No distress.   HENT: Normocephalic and atraumatic. Moist mucous membranes.  Eyes: EOM are normal.    Neck: No JVD present. No carotid bruits.  Cardiovascular: Normal rate and regular rhythm. No murmur.  Pulmonary/Chest: Normal effort and air entry. No wheezing, rales, ronchi.  Abdominal: Normal bowel sounds. Soft, non tender.  Extremities: No lower extremity edema.  Neurological: Alert and oriented to person, place, and time.   Skin: Skin is warm and dry. No rash.  Psychiatric: Normal mood, affect and behavior.    Labs    CBC Results       04/30/19 04/27/19 04/26/19                    0816 0500 2110         WBC 10.64 (H) 7.54 6.15         RBC 5.35 (H) 4.33 4.84         HGB 16.3 (H) 13.0 14.5         HCT 47.0 (H) 38.3 43.0         MCV 87.9 88.5 88.8         MCH 30.5 30.0 30.0         MCHC 34.7 33.9 33.7          189 223         Comment for HGB at 0816 on 04/30/19:  ALL RESULTS HAVE BEEN CHECKED        CMP Results       04/30/19 04/29/19 04/28/19                    0816 0650 0934          (L) 137 138         K 3.8 4.4 3.8         Cl 99 105 104         CO2 22 21 (L) 22         Glucose 122 (H) 127 (H) 146 (H)         BUN 13 15 16         Creatinine 1.1 0.8 1.0         Calcium 9.7 9.4 9.4         Anion Gap 14 11 12         EGFR 48.6 (L) &gt;60.0 54.2 (L)                       Results from last 7 " days  Lab Units 04/28/19  0057 04/27/19  1913 04/27/19  1220   TROPONIN I ng/mL 0.50* 0.59* 0.35*       Cardiology results    TELEMETRY  Sinus rhythm    Cardiac Imaging   TRANSTHORACIC ECHO (TTE) COMPLETE 04/27/2019    Technically difficult study.    Normal left ventricular cavity size.  Normal wall thickness.  The mid and   apical left ventricular segments are akinetic/dyskinetic..  Ejection   fraction is moderately depressed.  This is likely injury/stunning in the   LAD perfusion territory but cannot exclude stress-induced cardiomyopathy.    No obvious apical thrombus.  Impaired left ventricular relaxation.    Mild mitral valve sclerosis.  Mild left atrial dilatation.  Mild mitral   insufficiency.    Aortic valve sclerosis.   mild aortic insufficiency.    Normal right heart.       Noé Moncada MD  4/30/2019  4:25 PM

## 2019-04-30 NOTE — NURSING NOTE
Pt resting in bed with heparin gtt infusing at 850units/ hr.  Initial assessment complete.  L radiat dressing CDI witth positive pulses distal to site.   meds administered per order.  VSS. Transport at bedside. To take pt via stretcher to cath lab.  Will await return.

## 2019-04-30 NOTE — PROGRESS NOTES
Called to see pt secondary to hypotension and dizziness after getting up. IVFS NS given. [facility]SYmptoms resolved. Dr Antwan castelan.

## 2019-04-30 NOTE — NURSING NOTE
Pt returned from cath lab in no acute distress.  heparin gtt discontinued per order.  L groin site CDI with + pulses distal to site.  Pt aware to keep left leg straight.    at bedside.  Call bell in reach will continue to monitor.

## 2019-05-01 LAB
ANION GAP SERPL CALC-SCNC: 13 MEQ/L (ref 3–15)
BUN SERPL-MCNC: 15 MG/DL (ref 8–20)
CALCIUM SERPL-MCNC: 9.4 MG/DL (ref 8.9–10.3)
CHLORIDE SERPL-SCNC: 100 MEQ/L (ref 98–109)
CO2 SERPL-SCNC: 22 MEQ/L (ref 22–32)
CREAT SERPL-MCNC: 1 MG/DL
ERYTHROCYTE [DISTWIDTH] IN BLOOD BY AUTOMATED COUNT: 13 % (ref 11.7–14.4)
GFR SERPL CREATININE-BSD FRML MDRD: 54.2 ML/MIN/1.73M*2
GLUCOSE SERPL-MCNC: 163 MG/DL (ref 70–99)
HCT VFR BLDCO AUTO: 43.2 %
HGB BLD-MCNC: 14.7 G/DL
MAGNESIUM SERPL-MCNC: 2.1 MG/DL (ref 1.8–2.5)
MCH RBC QN AUTO: 30.1 PG (ref 28–33.2)
MCHC RBC AUTO-ENTMCNC: 34 G/DL (ref 32.2–35.5)
MCV RBC AUTO: 88.3 FL (ref 83–98)
PDW BLD AUTO: 9.1 FL (ref 9.4–12.3)
PLATELET # BLD AUTO: 223 K/UL
POTASSIUM SERPL-SCNC: 3.7 MEQ/L (ref 3.6–5.1)
RBC # BLD AUTO: 4.89 M/UL (ref 3.93–5.22)
SODIUM SERPL-SCNC: 135 MEQ/L (ref 136–144)
WBC # BLD AUTO: 9.1 K/UL

## 2019-05-01 PROCEDURE — 63700000 HC SELF-ADMINISTRABLE DRUG: Performed by: STUDENT IN AN ORGANIZED HEALTH CARE EDUCATION/TRAINING PROGRAM

## 2019-05-01 PROCEDURE — 99232 SBSQ HOSP IP/OBS MODERATE 35: CPT | Performed by: HOSPITALIST

## 2019-05-01 PROCEDURE — 85027 COMPLETE CBC AUTOMATED: CPT | Performed by: STUDENT IN AN ORGANIZED HEALTH CARE EDUCATION/TRAINING PROGRAM

## 2019-05-01 PROCEDURE — 21400000 HC ROOM AND CARE CCU/INTERMEDIATE

## 2019-05-01 PROCEDURE — 80048 BASIC METABOLIC PNL TOTAL CA: CPT | Performed by: STUDENT IN AN ORGANIZED HEALTH CARE EDUCATION/TRAINING PROGRAM

## 2019-05-01 PROCEDURE — 83735 ASSAY OF MAGNESIUM: CPT | Performed by: STUDENT IN AN ORGANIZED HEALTH CARE EDUCATION/TRAINING PROGRAM

## 2019-05-01 PROCEDURE — 99232 SBSQ HOSP IP/OBS MODERATE 35: CPT | Performed by: INTERNAL MEDICINE

## 2019-05-01 PROCEDURE — 36415 COLL VENOUS BLD VENIPUNCTURE: CPT | Performed by: STUDENT IN AN ORGANIZED HEALTH CARE EDUCATION/TRAINING PROGRAM

## 2019-05-01 PROCEDURE — 63700000 HC SELF-ADMINISTRABLE DRUG: Performed by: INTERNAL MEDICINE

## 2019-05-01 RX ORDER — POTASSIUM CHLORIDE 750 MG/1
40 TABLET, FILM COATED, EXTENDED RELEASE ORAL ONCE
Status: COMPLETED | OUTPATIENT
Start: 2019-05-01 | End: 2019-05-01

## 2019-05-01 RX ORDER — POTASSIUM CHLORIDE 1.5 G/1.58G
40 POWDER, FOR SOLUTION ORAL ONCE
Status: ACTIVE | OUTPATIENT
Start: 2019-05-01 | End: 2019-05-02

## 2019-05-01 RX ADMIN — ASPIRIN 81 MG: 81 TABLET, COATED ORAL at 09:56

## 2019-05-01 RX ADMIN — PANTOPRAZOLE SODIUM 40 MG: 40 TABLET, DELAYED RELEASE ORAL at 09:54

## 2019-05-01 RX ADMIN — METOPROLOL SUCCINATE 50 MG: 50 TABLET, EXTENDED RELEASE ORAL at 09:56

## 2019-05-01 RX ADMIN — TICAGRELOR 90 MG: 90 TABLET ORAL at 09:54

## 2019-05-01 RX ADMIN — POTASSIUM CHLORIDE 40 MEQ: 750 TABLET, FILM COATED, EXTENDED RELEASE ORAL at 20:23

## 2019-05-01 RX ADMIN — ROSUVASTATIN CALCIUM 20 MG: 20 TABLET, FILM COATED ORAL at 19:20

## 2019-05-01 RX ADMIN — TICAGRELOR 90 MG: 90 TABLET ORAL at 19:20

## 2019-05-01 NOTE — PLAN OF CARE
Problem: Patient Care Overview  Goal: Individualization & Mutuality   04/28/19 0047   Individualization   Patient Specific Preferences prefers to be called Jennifer   Patient Specific Goals to be discharged   Patient Specific Interventions care clustered, awakenings minimized       Problem: Cardiac: ACS (Acute Coronary Syndrome) (Adult)  Goal: Signs and Symptoms of Listed Potential Problems Will be Absent, Minimized or Managed (Cardiac: ACS)  Outcome: Ongoing (interventions implemented as appropriate)   04/30/19 0028   Cardiac: ACS (Acute Coronary Syndrome)   Problems Assessed (Acute Coronary Syndrome) all   Problems Present (Acute Coronary Syndrome) none

## 2019-05-01 NOTE — PATIENT CARE CONFERENCE
Care Progression Rounds Note  Date: 5/1/2019  Time: 12:48 PM     Patient Name: Jennifer Sams     Medical Record Number: [id number]   YOB: 1945  Sex: Female      Room/Bed: 0164    Admitting Diagnosis: NSTEMI (non-ST elevated myocardial infarction) (CMS/HCC) ([facility]) [I21.4]   Admit Date/Time: 4/26/2019  9:57 PM    Primary Diagnosis: NSTEMI (non-ST elevated myocardial infarction) (CMS/HCC) ([facility])  Principal Problem: NSTEMI (non-ST elevated myocardial infarction) (CMS/HCC) ([facility])    GMLOS: 2.2  Anticipated Discharge Date: 5/3/2019    AM-PAC  Mobility Score: 24    Discharge Planning:  Concerns To Be Addressed: no discharge needs identified  Anticipated Discharge Disposition: home without services    Barriers to Discharge:  Barriers to Discharge: Medical issues not resolved, Test pending    Participants:  physical therapy, nursing, , physician, social work/services

## 2019-05-01 NOTE — PROGRESS NOTES
Internal Medicine  Daily Progress Note       SUBJECTIVE   This is a 74 y.o. year-old female admitted on 2019 with NSTEMI (non-ST elevated myocardial infarction) (CMS/HCC) ([facility]) [I21.4].    Interval History: No events in the night. Having no further chest or abd discomfort.  Unfortunately given prolonged cath time yesterday will have to have LHC on Thursday to complete Lcx stenting. Likely discharge Friday. Patient understands the plan.      OBJECTIVE      Vital signs in last 24 hours:  Temp:  [36.3 °C (97.4 °F)-36.7 °C (98.1 °F)] 36.7 °C (98 °F)  Heart Rate:  [64-83] 77  Resp:  [14-22] 16  BP: ()/(57-81) 96/57  Temp (24hrs), Av.5 °C (97.7 °F), Min:36.3 °C (97.4 °F), Max:36.7 °C (98.1 °F)    Intake/Output     None          PHYSICAL EXAMINATION      Physical Exam  Constitutional: She is oriented to person, place, and time. She appears well-developed and well-nourished. No distress.   HENT:   Head: Normocephalic and atraumatic.   Nose: Nose normal.   Eyes: Right eye exhibits no discharge. Left eye exhibits no discharge. No scleral icterus.   Neck: Neck supple. No JVD present.   Cardiovascular: Normal rate, regular rhythm, normal heart sounds and intact distal pulses.  Exam reveals no gallop and no friction rub.    No murmur heard.  Pulmonary/Chest: Effort normal and breath sounds normal. No respiratory distress. She has no wheezes. She has no rales. She exhibits no tenderness.   Abdominal: Soft. Bowel sounds are normal.   Musculoskeletal: She exhibits no edema.   Neurological: She is alert and oriented to person, place, and time.   Skin: Skin is warm and dry. Capillary refill takes less than 2 seconds. She is not diaphoretic.   Psychiatric: She has a normal mood and affect. Her behavior is normal. Judgment and thought content normal.   Vitals reviewed.     LINES, CATHETERS, DRAINS, AIRWAYS, AND WOUNDS   Lines, Drains, Airways, Wounds:  Peripheral IV 19 Right Antecubital (Active)   Number of  "days: 2       Comments:      LABS / IMAGING / TELE      Labs    Results from last 7 days  Lab Units 04/30/19  0816  04/26/19  2110   SODIUM mEQ/L 135*  < > 138   POTASSIUM mEQ/L 3.8  < > 3.7   CHLORIDE mEQ/L 99  < > 105   CO2 mEQ/L 22  < > 23   BUN mg/dL 13  < > 14   CREATININE mg/dL 1.1  < > 0.8   CALCIUM mg/dL 9.7  < > 9.4   ALBUMIN g/dL  --   --  4.1   BILIRUBIN TOTAL mg/dL  --   --  0.5   ALK PHOS IU/L  --   --  79   ALT IU/L  --   --  17   AST IU/L  --   --  24   GLUCOSE mg/dL 122*  < > 108*   < > = values in this interval not displayed.      Results from last 7 days  Lab Units 04/30/19  0816   MAGNESIUM mg/dL 2.1       Results from last 7 days  Lab Units 04/30/19  0816   WBC K/uL 10.64*   HEMOGLOBIN g/dL 16.3*   HEMATOCRIT % 47.0*   PLATELETS K/uL 259         Imaging  I have independently reviewed the pertinent imaging from the last 24 hrs.    ECG/Telemetry  I have independently reviewed the ECG. Significant findings include anterolateral T wave inversions.    ASSESSMENT AND PLAN      * NSTEMI (non-ST elevated myocardial infarction) (CMS/HCC) ([facility])   Assessment & Plan    P/w progressive sob and typical chest pain  ECG non ischemic  Troponin peaked at 0.59  TTE and ECG showing LAD territory  [facility] with diffuse disease, LAD received FRANK as thought to be the culprit lesion. Not amenable to CABG given no touchdown sites.     Plan:  - Serial ECGs  - Heparin gtt off.   - Continue Metop succinate, Statin and Aspirin  - Will try 2.5 mg lisinopril prior to discharge.   - Cardiology consulted, appreciate recs.   - [facility] this am for staged FRANK to RCA x3. Given prolonged procedure will have to do Lcx on Thursday.      GERD (gastroesophageal reflux disease)   Assessment & Plan    - Continue PPI     Hypercholesterolemia   Assessment & Plan    - Had been intolerant to many statins before due to GI intolerance and \"muscle problems\"    Plan:  - tolerating rosuvastatin 20 mg.           VTE Assessment: Padua    Code Status: Full " Code  Estimated discharge date: 5/1/2019     ATTENDING DOCUMENTATION  ALSO SEE ATTENDING ATTESTATION SECTION OF NOTE

## 2019-05-01 NOTE — NURSING NOTE
Assessed pt. Pt reports 10/10 sharp head pain when coughing. Pt breath sounds clear bilaterally. Pt has intermittent nonproductive cough. Paged Dr. Ceja.  Pt L groin site ecchymotic with + pulses distal to site. Will continue to monitor.

## 2019-05-01 NOTE — NURSING NOTE
Pt resting in bed and offers no complaints.   Nutrition options discussed with patient.  Call bell in reach. Will continue to monitor.

## 2019-05-01 NOTE — PROGRESS NOTES
Per RN, pt did not drink potassium packet, ordered 40 meq in pill form.    Loretta Ceja MD  Internal Medicine PGY-1  [id number]

## 2019-05-01 NOTE — PROGRESS NOTES
Cardiology Progress Note    ASSESSMENT AND PLAN     NSTEMI  -Peak troponin 0 0.53.  Echo with moderate LV dysfunction with regional wall motion abnormality in the LAD distribution  -Cardiac cath on 4/29/19 showed critical three-vessel coronary artery disease including 99% mid LAD stenosis and diffuse severe distal LAD atherosclerotic disease.  Poor touch down site for CABG.  -Successful stenting of the LAD mid to distal using drug-eluting stent on 4/29/19  -Successful complex PCI of the right coronary artery today on April 30 with excellent angiographic result.  -Patient will return to Cath Lab on May 2 for the final staged intervention of the left circumflex artery.  -Continue aspirin and Brilinta for now.  Brilinta can be changed to Plavix after a month if there is issue with insurance coverage.  -continue beta-blocker and statin.     LV dysfunction  Echo showed moderately decreased LV systolic function.  -Continue beta-blocker.  -We will start ACE inhibitors before discharge after completion of staged intervention.  -Patient's LVEDP was elevated at 30 in the Cath Lab on April 29.   -Monitor volume status.      Dyslipidemia  - , TG 96, HDL 50,   -High-dose statin if she can tolerate.  - Goal LDL < 70 in this patient     3. Infrarenal abdominal aortic ectasia   - Stable on recent CT scan performed in Feb 2019.      SUBJECTIVE  No acute events overnight.    Patient is doing well. Pt has no complaints: no chest pain/tightness, shortness of breath, palpitations, LE swelling.    Meds:   aspirin 81 mg oral Daily   metoprolol succinate XL 50 mg oral Daily   pantoprazole 40 mg oral Daily   rosuvastatin 20 mg oral Daily   ticagrelor 90 mg oral BID       Review of Systems:  10 point review of systems completed and otherwise negative unless noted above in the HPI    Past medical history,active medical problems list and social history were all reviewed and updated as necessary today.    OBJECTIVE:   /65  "(Patient Position: Lying)   Pulse 83   Temp 36.7 °C (98.1 °F) (Oral)   Resp 16   Ht 1.702 m (5' 7.01\")   Wt 66.1 kg (145 lb 11.2 oz)   SpO2 92%   BMI 22.81 kg/m²     Intake/Output Summary (Last 24 hours) at 05/01/19 0738  Last data filed at 04/30/19 1151   Gross per 24 hour   Intake                0 ml   Output               10 ml   Net              -10 ml       Physical Exam   Constitutional: Well-developed and well-nourished. No distress.   HENT: Normocephalic and atraumatic. Moist mucous membranes.  Eyes: EOM are normal.    Neck: No JVD present. No carotid bruits.  Cardiovascular: Normal rate and regular rhythm. No murmur.  Pulmonary/Chest: Normal effort and air entry. No wheezing, rales, ronchi.  Abdominal: Normal bowel sounds. Soft, non tender.  Extremities: No lower extremity edema.  Neurological: Alert and oriented to person, place, and time.   Skin: Skin is warm and dry. No rash.  Psychiatric: Normal mood, affect and behavior.    Labs    CBC Results       04/30/19 04/27/19 04/26/19                    0816 0500 2110         WBC 10.64 (H) 7.54 6.15         RBC 5.35 (H) 4.33 4.84         HGB 16.3 (H) 13.0 14.5         HCT 47.0 (H) 38.3 43.0         MCV 87.9 88.5 88.8         MCH 30.5 30.0 30.0         MCHC 34.7 33.9 33.7          189 223         Comment for HGB at 0816 on 04/30/19:  ALL RESULTS HAVE BEEN CHECKED        CMP Results       04/30/19 04/29/19 04/28/19                    0816 0650 0934          (L) 137 138         K 3.8 4.4 3.8         Cl 99 105 104         CO2 22 21 (L) 22         Glucose 122 (H) 127 (H) 146 (H)         BUN 13 15 16         Creatinine 1.1 0.8 1.0         Calcium 9.7 9.4 9.4         Anion Gap 14 11 12         EGFR 48.6 (L) &gt;60.0 54.2 (L)                       Results from last 7 days  Lab Units 04/28/19  0057 04/27/19  1913 04/27/19  1220   TROPONIN I ng/mL 0.50* 0.59* 0.35*       Cardiology results    TELEMETRY  Sinus rhythm    Cardiac Imaging   TRANSTHORACIC " ECHO (TTE) COMPLETE 04/27/2019    Technically difficult study.    Normal left ventricular cavity size.  Normal wall thickness.  The mid and   apical left ventricular segments are akinetic/dyskinetic..  Ejection   fraction is moderately depressed.  This is likely injury/stunning in the   LAD perfusion territory but cannot exclude stress-induced cardiomyopathy.    No obvious apical thrombus.  Impaired left ventricular relaxation.    Mild mitral valve sclerosis.  Mild left atrial dilatation.  Mild mitral   insufficiency.    Aortic valve sclerosis.   mild aortic insufficiency.    Normal right heart.         Imaging:  Cardiac Catheterization    Result Date: 5/1/2019  STAGED INTERVENTION: 1. Successful PCI of the proximal RCA lesion with a Resolute Tornado 3.0 x 15mm FRANK, post-dilated with a 3.5 x 12 mm non-compliant balloon to 20 ana, with no residual stenosis and ITALO 3 flow. 2. Successful PCI of the mid RCA lesion with a Resolute Tornado 3.0 x 15mm FRANK, deployed at 16 ana, with no residual stenosis and ITALO 3 flow. 3. Successful PCI of the distal RCA and proximal PDA lesion with over-lapping drug-eluting stents (Resolute Tornado 2.00 x 22mm FRANK and Resolute Tornado 2.00 x 15mm FRANK), post-dilated with a 2.5 x 15 mm non-compliant balloon to 18 ana, with no residual stenosis and ITALO 3 flow. RECOMMENDATIONS:  1. DAPT with aspirin and ticagrelor. 2. Continue with aggressive risk factor modification and medical therapy. 3. Return for PCI to OM 2.          Noé Moncada MD  5/1/2019  7:38 AM

## 2019-05-02 LAB
ANION GAP SERPL CALC-SCNC: 11 MEQ/L (ref 3–15)
BUN SERPL-MCNC: 11 MG/DL (ref 8–20)
CALCIUM SERPL-MCNC: 9.2 MG/DL (ref 8.9–10.3)
CHLORIDE SERPL-SCNC: 104 MEQ/L (ref 98–109)
CO2 SERPL-SCNC: 22 MEQ/L (ref 22–32)
CREAT SERPL-MCNC: 1 MG/DL
ERYTHROCYTE [DISTWIDTH] IN BLOOD BY AUTOMATED COUNT: 12.9 % (ref 11.7–14.4)
GFR SERPL CREATININE-BSD FRML MDRD: 54.2 ML/MIN/1.73M*2
GLUCOSE SERPL-MCNC: 109 MG/DL (ref 70–99)
HCT VFR BLDCO AUTO: 41.8 %
HGB BLD-MCNC: 14.3 G/DL
MAGNESIUM SERPL-MCNC: 2.1 MG/DL (ref 1.8–2.5)
MCH RBC QN AUTO: 30.2 PG (ref 28–33.2)
MCHC RBC AUTO-ENTMCNC: 34.2 G/DL (ref 32.2–35.5)
MCV RBC AUTO: 88.4 FL (ref 83–98)
PDW BLD AUTO: 9.1 FL (ref 9.4–12.3)
PLATELET # BLD AUTO: 229 K/UL
POCT ACT-LR: 397 SEC (ref 113–149)
POCT ACT-LR: 68 SEC (ref 113–149)
POCT TEST: ABNORMAL
POCT TEST: ABNORMAL
POTASSIUM SERPL-SCNC: 4.2 MEQ/L (ref 3.6–5.1)
RBC # BLD AUTO: 4.73 M/UL (ref 3.93–5.22)
SODIUM SERPL-SCNC: 137 MEQ/L (ref 136–144)
WBC # BLD AUTO: 7.9 K/UL

## 2019-05-02 PROCEDURE — C1894 INTRO/SHEATH, NON-LASER: HCPCS | Performed by: INTERNAL MEDICINE

## 2019-05-02 PROCEDURE — 21400000 HC ROOM AND CARE CCU/INTERMEDIATE

## 2019-05-02 PROCEDURE — 63700000 HC SELF-ADMINISTRABLE DRUG: Performed by: STUDENT IN AN ORGANIZED HEALTH CARE EDUCATION/TRAINING PROGRAM

## 2019-05-02 PROCEDURE — C1887 CATHETER, GUIDING: HCPCS | Performed by: INTERNAL MEDICINE

## 2019-05-02 PROCEDURE — 83735 ASSAY OF MAGNESIUM: CPT | Performed by: STUDENT IN AN ORGANIZED HEALTH CARE EDUCATION/TRAINING PROGRAM

## 2019-05-02 PROCEDURE — C9600 PERC DRUG-EL COR STENT SING: HCPCS | Mod: LC | Performed by: INTERNAL MEDICINE

## 2019-05-02 PROCEDURE — 85027 COMPLETE CBC AUTOMATED: CPT | Performed by: STUDENT IN AN ORGANIZED HEALTH CARE EDUCATION/TRAINING PROGRAM

## 2019-05-02 PROCEDURE — C1769 GUIDE WIRE: HCPCS | Performed by: INTERNAL MEDICINE

## 2019-05-02 PROCEDURE — 63600105 HC IODINE BASED CONTRAST: Mod: JW | Performed by: INTERNAL MEDICINE

## 2019-05-02 PROCEDURE — 99233 SBSQ HOSP IP/OBS HIGH 50: CPT | Mod: 25 | Performed by: INTERNAL MEDICINE

## 2019-05-02 PROCEDURE — C1874 STENT, COATED/COV W/DEL SYS: HCPCS | Performed by: INTERNAL MEDICINE

## 2019-05-02 PROCEDURE — 63700000 HC SELF-ADMINISTRABLE DRUG: Performed by: INTERNAL MEDICINE

## 2019-05-02 PROCEDURE — 92928 PRQ TCAT PLMT NTRAC ST 1 LES: CPT | Mod: LC | Performed by: INTERNAL MEDICINE

## 2019-05-02 PROCEDURE — 99232 SBSQ HOSP IP/OBS MODERATE 35: CPT | Performed by: HOSPITALIST

## 2019-05-02 PROCEDURE — 99152 MOD SED SAME PHYS/QHP 5/>YRS: CPT | Performed by: INTERNAL MEDICINE

## 2019-05-02 PROCEDURE — 63600000 HC DRUGS/DETAIL CODE: Performed by: INTERNAL MEDICINE

## 2019-05-02 PROCEDURE — C1725 CATH, TRANSLUMIN NON-LASER: HCPCS | Performed by: INTERNAL MEDICINE

## 2019-05-02 PROCEDURE — 027034Z DILATION OF CORONARY ARTERY, ONE ARTERY WITH DRUG-ELUTING INTRALUMINAL DEVICE, PERCUTANEOUS APPROACH: ICD-10-PCS | Performed by: INTERNAL MEDICINE

## 2019-05-02 PROCEDURE — 99153 MOD SED SAME PHYS/QHP EA: CPT | Performed by: INTERNAL MEDICINE

## 2019-05-02 PROCEDURE — 36415 COLL VENOUS BLD VENIPUNCTURE: CPT | Performed by: STUDENT IN AN ORGANIZED HEALTH CARE EDUCATION/TRAINING PROGRAM

## 2019-05-02 PROCEDURE — 25000000 HC PHARMACY GENERAL: Performed by: INTERNAL MEDICINE

## 2019-05-02 PROCEDURE — B211YZZ FLUOROSCOPY OF MULTIPLE CORONARY ARTERIES USING OTHER CONTRAST: ICD-10-PCS | Performed by: INTERNAL MEDICINE

## 2019-05-02 PROCEDURE — 27200000 HC STERILE SUPPLY: Performed by: INTERNAL MEDICINE

## 2019-05-02 PROCEDURE — 80048 BASIC METABOLIC PNL TOTAL CA: CPT | Performed by: STUDENT IN AN ORGANIZED HEALTH CARE EDUCATION/TRAINING PROGRAM

## 2019-05-02 DEVICE — STENT  RESOLUTE ONYX 2.00 X 30MM: Type: IMPLANTABLE DEVICE | Site: CORONARY | Status: FUNCTIONAL

## 2019-05-02 RX ORDER — METOPROLOL SUCCINATE 25 MG/1
25 TABLET, EXTENDED RELEASE ORAL DAILY
Status: DISCONTINUED | OUTPATIENT
Start: 2019-05-03 | End: 2019-05-03 | Stop reason: HOSPADM

## 2019-05-02 RX ORDER — LIDOCAINE HYDROCHLORIDE 10 MG/ML
INJECTION, SOLUTION INFILTRATION; PERINEURAL AS NEEDED
Status: DISCONTINUED | OUTPATIENT
Start: 2019-05-02 | End: 2019-05-02 | Stop reason: HOSPADM

## 2019-05-02 RX ORDER — IODIXANOL 320 MG/ML
INJECTION, SOLUTION INTRAVASCULAR AS NEEDED
Status: DISCONTINUED | OUTPATIENT
Start: 2019-05-02 | End: 2019-05-02 | Stop reason: HOSPADM

## 2019-05-02 RX ORDER — MIDAZOLAM HYDROCHLORIDE 2 MG/2ML
INJECTION, SOLUTION INTRAMUSCULAR; INTRAVENOUS AS NEEDED
Status: DISCONTINUED | OUTPATIENT
Start: 2019-05-02 | End: 2019-05-02 | Stop reason: HOSPADM

## 2019-05-02 RX ORDER — HEPARIN SODIUM 1000 [USP'U]/ML
INJECTION, SOLUTION INTRAVENOUS; SUBCUTANEOUS AS NEEDED
Status: DISCONTINUED | OUTPATIENT
Start: 2019-05-02 | End: 2019-05-02 | Stop reason: HOSPADM

## 2019-05-02 RX ADMIN — ASPIRIN 81 MG: 81 TABLET, COATED ORAL at 09:14

## 2019-05-02 RX ADMIN — ACETAMINOPHEN 325 MG: 325 TABLET ORAL at 18:32

## 2019-05-02 RX ADMIN — METOPROLOL SUCCINATE 50 MG: 50 TABLET, EXTENDED RELEASE ORAL at 09:14

## 2019-05-02 RX ADMIN — TICAGRELOR 90 MG: 90 TABLET ORAL at 09:14

## 2019-05-02 RX ADMIN — TICAGRELOR 90 MG: 90 TABLET ORAL at 20:27

## 2019-05-02 RX ADMIN — PANTOPRAZOLE SODIUM 40 MG: 40 TABLET, DELAYED RELEASE ORAL at 09:14

## 2019-05-02 NOTE — PROGRESS NOTES
"Cardiology Progress Note    ASSESSMENT AND PLAN    NSTEMI  -Cardiac cath on 4/29/19 showed critical three-vessel coronary artery disease including 99% mid LAD stenosis and diffuse severe distal LAD atherosclerotic disease.  Poor touch down site for CABG.  -Successful stenting of the LAD mid to distal using drug-eluting stent on 4/29/19  -Successful complex PCI of the right coronary artery on April 30 with excellent angiographic result.  -s/p successful PCI of Lcx today.  -Continue aspirin and Brilinta for now.  Brilinta can be changed to Plavix after a month if there is issue with insurance coverage.  -continue beta-blocker and statin.     LV dysfunction  Echo showed moderately decreased LV systolic function.  -Continue beta-blocker.  -We will start ACE inhibitors tomorrow.        Dyslipidemia  - , TG 96, HDL 50,   -High-dose statin if she can tolerate.  - Goal LDL < 70 in this patient     3. Infrarenal abdominal aortic ectasia   - Stable on recent CT scan performed in Feb 2019.        SUBJECTIVE  No acute events overnight.    Patient is doing well. Pt has no complaints: no chest pain/tightness, shortness of breath, palpitations, LE swelling.    Meds:   [MAR Hold] aspirin 81 mg oral Daily   [START ON 5/3/2019] metoprolol succinate XL 25 mg oral Daily   [MAR Hold] pantoprazole 40 mg oral Daily   [MAR Hold] rosuvastatin 20 mg oral Daily   [MAR Hold] ticagrelor 90 mg oral BID       Review of Systems:  10 point review of systems completed and otherwise negative unless noted above in the HPI    Past medical history,active medical problems list and social history were all reviewed and updated as necessary today.    OBJECTIVE:   BP (!) 117/55   Pulse 80   Temp 36.7 °C (98.1 °F) (Oral)   Resp (!) 26   Ht 1.702 m (5' 7.01\")   Wt 66.1 kg (145 lb 11.2 oz)   SpO2 95%   BMI 22.81 kg/m²     Intake/Output Summary (Last 24 hours) at 05/02/19 1502  Last data filed at 05/02/19 1443   Gross per 24 hour   Intake     "            0 ml   Output               10 ml   Net              -10 ml       Physical Exam   Constitutional: Well-developed and well-nourished. No distress.   HENT: Normocephalic and atraumatic. Moist mucous membranes.  Eyes: EOM are normal.    Neck: No JVD present. No carotid bruits.  Cardiovascular: Normal rate and regular rhythm. No murmur.  Pulmonary/Chest: Normal effort and air entry. No wheezing, rales, ronchi.  Abdominal: Normal bowel sounds. Soft, non tender.  Extremities: No lower extremity edema.  Neurological: Alert and oriented to person, place, and time.   Skin: Skin is warm and dry. No rash.  Psychiatric: Normal mood, affect and behavior.    Labs    CBC Results       05/02/19 05/01/19 04/30/19                    0814 1408 0816         WBC 7.90 9.10 10.64 (H)         RBC 4.73 4.89 5.35 (H)         HGB 14.3 14.7 16.3 (H)         HCT 41.8 43.2 47.0 (H)         MCV 88.4 88.3 87.9         MCH 30.2 30.1 30.5         MCHC 34.2 34.0 34.7          223 259         Comment for HGB at 0816 on 04/30/19:  ALL RESULTS HAVE BEEN CHECKED        CMP Results       05/02/19 05/01/19 04/30/19                    0814 1408 0816          135 (L) 135 (L)         K 4.2 3.7 3.8         Cl 104 100 99         CO2 22 22 22         Glucose 109 (H) 163 (H) 122 (H)         BUN 11 15 13         Creatinine 1.0 1.0 1.1         Calcium 9.2 9.4 9.7         Anion Gap 11 13 14         EGFR 54.2 (L) 54.2 (L) 48.6 (L)                       Results from last 7 days  Lab Units 04/28/19  0057 04/27/19  1913 04/27/19  1220   TROPONIN I ng/mL 0.50* 0.59* 0.35*       Cardiology results    TELEMETRY  Sinus rhythm    Cardiac Imaging   TRANSTHORACIC ECHO (TTE) COMPLETE 04/27/2019    Technically difficult study.    Normal left ventricular cavity size.  Normal wall thickness.  The mid and   apical left ventricular segments are akinetic/dyskinetic..  Ejection   fraction is moderately depressed.  This is likely injury/stunning in the   LAD  perfusion territory but cannot exclude stress-induced cardiomyopathy.    No obvious apical thrombus.  Impaired left ventricular relaxation.    Mild mitral valve sclerosis.  Mild left atrial dilatation.  Mild mitral   insufficiency.    Aortic valve sclerosis.   mild aortic insufficiency.    Normal right heart.         Noé Moncada MD  5/2/2019  3:02 PM

## 2019-05-02 NOTE — NURSING NOTE
Arrived in patient's room to begin to remove radial band. Upon inspection, pt's L arm was very swollen, with increased tenderness and bruising. Pt stated that she had good feeling in her hand, and while discolored, pt had +2 radial pulse and was warm to the touch. Cardiology called and they came to assess. Radial dressing was removed, no bleeding occurred. Pressure was held on the swollen site by cardiology and the swelling decreased. Will continue to assess for further bleeding or s/s of hematoma.

## 2019-05-02 NOTE — PRE-PROCEDURE NOTE
Cardiac Cath Lab Pre-procedure Note    - Patient was seen and examined at bedside.  - The patient's chart and all data was reviewed.  - The procedure, treatment alternatives, risks and benefits were explained with specific risks discussed.  - Patient was consented for cardiac cath procedure and possible PCI.  - Patient's case was found appropriate for dual antiplatelet therapy.    Patient's clinical presentation to the cardiac cath lab: staged PCI.     Patient is at risk for acute myocardial infarction.   Patient appears to be managing well.         Total anti-anginal medications: 1.

## 2019-05-02 NOTE — PROGRESS NOTES
Internal Medicine  Daily Progress Note       SUBJECTIVE   This is a 74 y.o. year-old female admitted on 2019 with NSTEMI (non-ST elevated myocardial infarction) (CMS/HCC) ([facility]) [I21.4].    Interval History: No events in the night. Having no further chest or abd discomfort. Answered numerous questions for the patient this am to her full satisfaction. Plan for Lcx stents at 1 pm. Potential discharge tomorrow afternoon.      OBJECTIVE      Vital signs in last 24 hours:  Temp:  [36.4 °C (97.6 °F)-37.1 °C (98.7 °F)] 36.4 °C (97.6 °F)  Heart Rate:  [72-85] 78  Resp:  [16] 16  BP: ()/(56-72) 97/60  Temp (24hrs), Av.8 °C (98.3 °F), Min:36.4 °C (97.6 °F), Max:37.1 °C (98.7 °F)    Intake/Output     None          PHYSICAL EXAMINATION      Physical Exam  Constitutional: She is oriented to person, place, and time. She appears well-developed and well-nourished. No distress.   HENT:   Head: Normocephalic and atraumatic.   Nose: Nose normal.   Eyes: Right eye exhibits no discharge. Left eye exhibits no discharge. No scleral icterus.   Neck: Neck supple. No JVD present.   Cardiovascular: Normal rate, regular rhythm, normal heart sounds and intact distal pulses.  Exam reveals no gallop and no friction rub.    No murmur heard.  Pulmonary/Chest: Effort normal and breath sounds normal. No respiratory distress. She has no wheezes. She has no rales. She exhibits no tenderness.   Abdominal: Soft. Bowel sounds are normal.   Musculoskeletal: She exhibits no edema.   Neurological: She is alert and oriented to person, place, and time.   Skin: Skin is warm and dry. Capillary refill takes less than 2 seconds. She is not diaphoretic.   Psychiatric: She has a normal mood and affect. Her behavior is normal. Judgment and thought content normal.   Vitals reviewed.     LINES, CATHETERS, DRAINS, AIRWAYS, AND WOUNDS   Lines, Drains, Airways, Wounds:  Peripheral IV 19 Right Antecubital (Active)   Number of days: 2  "      Comments:      LABS / IMAGING / TELE      Labs    Results from last 7 days  Lab Units 05/02/19  0814  04/26/19  2110   SODIUM mEQ/L 137  < > 138   POTASSIUM mEQ/L 4.2  < > 3.7   CHLORIDE mEQ/L 104  < > 105   CO2 mEQ/L 22  < > 23   BUN mg/dL 11  < > 14   CREATININE mg/dL 1.0  < > 0.8   CALCIUM mg/dL 9.2  < > 9.4   ALBUMIN g/dL  --   --  4.1   BILIRUBIN TOTAL mg/dL  --   --  0.5   ALK PHOS IU/L  --   --  79   ALT IU/L  --   --  17   AST IU/L  --   --  24   GLUCOSE mg/dL 109*  < > 108*   < > = values in this interval not displayed.      Results from last 7 days  Lab Units 05/02/19  0814   MAGNESIUM mg/dL 2.1       Results from last 7 days  Lab Units 05/02/19  0814   WBC K/uL 7.90   HEMOGLOBIN g/dL 14.3   HEMATOCRIT % 41.8   PLATELETS K/uL 229         Imaging  I have independently reviewed the pertinent imaging from the last 24 hrs.    ECG/Telemetry  I have independently reviewed the ECG. Significant findings include anterolateral T wave inversions.    ASSESSMENT AND PLAN      * NSTEMI (non-ST elevated myocardial infarction) (CMS/HCC) ([facility])   Assessment & Plan    P/w progressive sob and typical chest pain  ECG non ischemic  Troponin peaked at 0.59  TTE and ECG showing LAD territory  [facility] with diffuse disease, LAD received FRANK as thought to be the culprit lesion. Not amenable to CABG given no touchdown sites.     Plan:  - Serial ECGs  - Heparin gtt off.   - Continue Metop succinate, Statin and Aspirin  - Will try 2.5 mg lisinopril prior to discharge.   - Cardiology consulted, appreciate recs.   - [facility] this am for staged FRANK to RCA x3. Given prolonged procedure will have to do Lcx today at 1 pm.      GERD (gastroesophageal reflux disease)   Assessment & Plan    - Continue PPI     Hypercholesterolemia   Assessment & Plan    - Had been intolerant to many statins before due to GI intolerance and \"muscle problems\"    Plan:  - tolerating rosuvastatin 20 mg.           VTE Assessment: Padua    Code Status: Full " Code  Estimated discharge date: 5/3/2019     ATTENDING DOCUMENTATION  ALSO SEE ATTENDING ATTESTATION SECTION OF NOTE

## 2019-05-02 NOTE — PATIENT CARE CONFERENCE
Care Progression Rounds Note  Date: 5/2/2019  Time: 11:35 AM     Patient Name: Jennifer Sams     Medical Record Number: [id number]   YOB: 1945  Sex: Female      Room/Bed: 0164    Admitting Diagnosis: NSTEMI (non-ST elevated myocardial infarction) (CMS/HCC) ([facility]) [I21.4]   Admit Date/Time: 4/26/2019  9:57 PM    Primary Diagnosis: NSTEMI (non-ST elevated myocardial infarction) (CMS/HCC) ([facility])  Principal Problem: NSTEMI (non-ST elevated myocardial infarction) (CMS/HCC) ([facility])    GMLOS: 2.2  Anticipated Discharge Date: 5/3/2019    AM-PAC  Mobility Score: 24    Discharge Planning:  Concerns To Be Addressed: no discharge needs identified  Anticipated Discharge Disposition: home without services    Barriers to Discharge:  Barriers to Discharge: Medical issues not resolved    Participants:  physical therapy, nursing, , physician, social work/services

## 2019-05-03 VITALS
SYSTOLIC BLOOD PRESSURE: 109 MMHG | HEIGHT: 67 IN | TEMPERATURE: 97.8 F | OXYGEN SATURATION: 96 % | HEART RATE: 76 BPM | BODY MASS INDEX: 21.75 KG/M2 | DIASTOLIC BLOOD PRESSURE: 73 MMHG | RESPIRATION RATE: 18 BRPM | WEIGHT: 138.6 LBS

## 2019-05-03 LAB
ANION GAP SERPL CALC-SCNC: 13 MEQ/L (ref 3–15)
BUN SERPL-MCNC: 11 MG/DL (ref 8–20)
CALCIUM SERPL-MCNC: 8.9 MG/DL (ref 8.9–10.3)
CHLORIDE SERPL-SCNC: 104 MEQ/L (ref 98–109)
CO2 SERPL-SCNC: 21 MEQ/L (ref 22–32)
CREAT SERPL-MCNC: 0.8 MG/DL
ERYTHROCYTE [DISTWIDTH] IN BLOOD BY AUTOMATED COUNT: 13 % (ref 11.7–14.4)
GFR SERPL CREATININE-BSD FRML MDRD: >60 ML/MIN/1.73M*2
GLUCOSE SERPL-MCNC: 103 MG/DL (ref 70–99)
HCT VFR BLDCO AUTO: 40.5 %
HGB BLD-MCNC: 13.8 G/DL
MAGNESIUM SERPL-MCNC: 2.1 MG/DL (ref 1.8–2.5)
MCH RBC QN AUTO: 30.5 PG (ref 28–33.2)
MCHC RBC AUTO-ENTMCNC: 34.1 G/DL (ref 32.2–35.5)
MCV RBC AUTO: 89.6 FL (ref 83–98)
PDW BLD AUTO: 9.5 FL (ref 9.4–12.3)
PLATELET # BLD AUTO: 232 K/UL
POTASSIUM SERPL-SCNC: 4.1 MEQ/L (ref 3.6–5.1)
RBC # BLD AUTO: 4.52 M/UL (ref 3.93–5.22)
SODIUM SERPL-SCNC: 138 MEQ/L (ref 136–144)
WBC # BLD AUTO: 7.21 K/UL

## 2019-05-03 PROCEDURE — 200200 PR NO CHARGE: Performed by: HOSPITALIST

## 2019-05-03 PROCEDURE — 63700000 HC SELF-ADMINISTRABLE DRUG: Performed by: INTERNAL MEDICINE

## 2019-05-03 PROCEDURE — 63700000 HC SELF-ADMINISTRABLE DRUG: Performed by: STUDENT IN AN ORGANIZED HEALTH CARE EDUCATION/TRAINING PROGRAM

## 2019-05-03 PROCEDURE — 85027 COMPLETE CBC AUTOMATED: CPT | Performed by: STUDENT IN AN ORGANIZED HEALTH CARE EDUCATION/TRAINING PROGRAM

## 2019-05-03 PROCEDURE — 36415 COLL VENOUS BLD VENIPUNCTURE: CPT | Performed by: STUDENT IN AN ORGANIZED HEALTH CARE EDUCATION/TRAINING PROGRAM

## 2019-05-03 PROCEDURE — 83735 ASSAY OF MAGNESIUM: CPT | Performed by: STUDENT IN AN ORGANIZED HEALTH CARE EDUCATION/TRAINING PROGRAM

## 2019-05-03 PROCEDURE — 99232 SBSQ HOSP IP/OBS MODERATE 35: CPT | Performed by: INTERNAL MEDICINE

## 2019-05-03 PROCEDURE — 80048 BASIC METABOLIC PNL TOTAL CA: CPT | Performed by: STUDENT IN AN ORGANIZED HEALTH CARE EDUCATION/TRAINING PROGRAM

## 2019-05-03 RX ORDER — LISINOPRIL 2.5 MG/1
2.5 TABLET ORAL DAILY
Status: DISCONTINUED | OUTPATIENT
Start: 2019-05-03 | End: 2019-05-03 | Stop reason: HOSPADM

## 2019-05-03 RX ORDER — ROSUVASTATIN CALCIUM 20 MG/1
20 TABLET, COATED ORAL DAILY
Qty: 30 TABLET | Refills: 0 | Status: SHIPPED | OUTPATIENT
Start: 2019-05-03 | End: 2019-05-09 | Stop reason: SDUPTHER

## 2019-05-03 RX ORDER — METOPROLOL SUCCINATE 25 MG/1
25 TABLET, EXTENDED RELEASE ORAL DAILY
Qty: 30 TABLET | Refills: 0 | Status: SHIPPED | OUTPATIENT
Start: 2019-05-03 | End: 2019-05-22 | Stop reason: SINTOL

## 2019-05-03 RX ORDER — LISINOPRIL 2.5 MG/1
2.5 TABLET ORAL DAILY
Qty: 30 TABLET | Refills: 0 | Status: SHIPPED | OUTPATIENT
Start: 2019-05-03 | End: 2019-05-08 | Stop reason: SDUPTHER

## 2019-05-03 RX ADMIN — METOPROLOL SUCCINATE 25 MG: 25 TABLET, EXTENDED RELEASE ORAL at 08:25

## 2019-05-03 RX ADMIN — ROSUVASTATIN CALCIUM 20 MG: 20 TABLET, FILM COATED ORAL at 08:25

## 2019-05-03 RX ADMIN — PANTOPRAZOLE SODIUM 40 MG: 40 TABLET, DELAYED RELEASE ORAL at 08:25

## 2019-05-03 RX ADMIN — TICAGRELOR 90 MG: 90 TABLET ORAL at 08:26

## 2019-05-03 RX ADMIN — ASPIRIN 81 MG: 81 TABLET, COATED ORAL at 08:25

## 2019-05-03 NOTE — PROGRESS NOTES
Cardiology Progress Note    ASSESSMENT AND PLAN     NSTEMI  -Cardiac cath on 4/29/19 showed critical three-vessel coronary artery disease including 99% mid LAD stenosis and diffuse severe distal LAD atherosclerotic disease.  Poor touch down site for CABG.  -Successful stenting of the LAD mid to distal using drug-eluting stent on 4/29/19  -Successful complex PCI of the right coronary artery on April 30 with excellent angiographic result.  -s/p successful PCI of Lcx on 5/2/19.  Ecchymosis and mild hematoma of the left forearm following  second radial procedure on May 2, 2019.  Radial pulse is intact with no compromise to distal blood flow.  -Continue aspirin and Brilinta for now.  Brilinta can be changed to Plavix after a month if there is issue with insurance coverage.  -continue beta-blocker and statin.   -Follow-up with Dr. Courtney in 2 weeks.    LV dysfunction  Echo showed moderately decreased LV systolic function.  -Continue beta-blocker.  - Lisinopril 2.5 mg started today. Will uptitrate in outpatient setting as tolerated by blood pressure.         Dyslipidemia  - , TG 96, HDL 50,   -High-dose statin if she can tolerate.  - Goal LDL < 70 in this patient     3. Infrarenal abdominal aortic ectasia   - Stable on recent CT scan performed in Feb 2019.         SUBJECTIVE  No acute events overnight.    Patient is doing well. Pt has no complaints: no chest pain/tightness, shortness of breath, palpitations, LE swelling.    Meds:   aspirin 81 mg oral Daily   lisinopril 2.5 mg oral Daily   metoprolol succinate XL 25 mg oral Daily   pantoprazole 40 mg oral Daily   rosuvastatin 20 mg oral Daily   ticagrelor 90 mg oral BID       Review of Systems:  10 point review of systems completed and otherwise negative unless noted above in the HPI    Past medical history,active medical problems list and social history were all reviewed and updated as necessary today.    OBJECTIVE:   /73 (BP Location: Right upper arm,  "Patient Position: Lying)   Pulse 76   Temp 36.6 °C (97.8 °F) (Oral)   Resp 18   Ht 1.702 m (5' 7.01\")   Wt 62.9 kg (138 lb 9.6 oz)   SpO2 96%   BMI 21.70 kg/m²     Intake/Output Summary (Last 24 hours) at 05/03/19 1030  Last data filed at 05/02/19 1600   Gross per 24 hour   Intake              360 ml   Output               10 ml   Net              350 ml       Physical Exam   Constitutional: Well-developed and well-nourished. No distress.   HENT: Normocephalic and atraumatic. Moist mucous membranes.  Eyes: EOM are normal.    Neck: No JVD present. No carotid bruits.  Cardiovascular: Normal rate and regular rhythm. No murmur.  Pulmonary/Chest: Normal effort and air entry. No wheezing, rales, ronchi.  Abdominal: Normal bowel sounds. Soft, non tender.  Extremities: No lower extremity edema.  Neurological: Alert and oriented to person, place, and time.   Skin: Skin is warm and dry. No rash.  Psychiatric: Normal mood, affect and behavior.    Labs    CBC Results       05/03/19 05/02/19 05/01/19                    0908 0814 1408         WBC 7.21 7.90 9.10         RBC 4.52 4.73 4.89         HGB 13.8 14.3 14.7         HCT 40.5 41.8 43.2         MCV 89.6 88.4 88.3         MCH 30.5 30.2 30.1         MCHC 34.1 34.2 34.0          229 223                     CMP Results       05/02/19 05/01/19 04/30/19                    0814 1408 0816          135 (L) 135 (L)         K 4.2 3.7 3.8         Cl 104 100 99         CO2 22 22 22         Glucose 109 (H) 163 (H) 122 (H)         BUN 11 15 13         Creatinine 1.0 1.0 1.1         Calcium 9.2 9.4 9.7         Anion Gap 11 13 14         EGFR 54.2 (L) 54.2 (L) 48.6 (L)                       Results from last 7 days  Lab Units 04/28/19  0057 04/27/19  1913 04/27/19  1220   TROPONIN I ng/mL 0.50* 0.59* 0.35*       Cardiology results    TELEMETRY  Sinus rhythm    Cardiac Imaging   TRANSTHORACIC ECHO (TTE) COMPLETE 04/27/2019    Technically difficult study.    Normal left " ventricular cavity size.  Normal wall thickness.  The mid and   apical left ventricular segments are akinetic/dyskinetic..  Ejection   fraction is moderately depressed.  This is likely injury/stunning in the   LAD perfusion territory but cannot exclude stress-induced cardiomyopathy.    No obvious apical thrombus.  Impaired left ventricular relaxation.    Mild mitral valve sclerosis.  Mild left atrial dilatation.  Mild mitral   insufficiency.    Aortic valve sclerosis.   mild aortic insufficiency.    Normal right heart.       Noé Moncada MD  5/3/2019  10:30 AM

## 2019-05-03 NOTE — NURSING NOTE
Patient received in bed awake and alert. Assessed, Monitor HR 73 NSR. Patient denied CP/SOB/Palpitations or lightheadedness. Discharged instructions reviewed with patient inclusive of medication names, purpose, route, doses and administration times and she expressed understanding. About 11:10am approximately, patient discharged to home with all personal items accompanied by spouse.

## 2019-05-03 NOTE — DISCHARGE INSTRUCTIONS
Ms. Sams,     You came into the hospital because you were experiencing chest discomfort that was responsive to nitroglycerin.  When you arrived at the hospital we were concerned that this may be related to a heart attack.  We found proteins in your blood that were consistent with a heart attack so you were admitted for further evaluation.  A heart catheterization was performed for further evaluation and revealed significant disease in all of the vessels of the heart.  You required placement of 6 drug-eluting stents to help open your arteries and increase the blood flow to the heart muscles.  You tolerated this procedure very well.  We have changed some of your medications around to best improve your outcome in the setting of cardiovascular disease. Please review these medications prior to discharge. You will need to followed-up very closely with Dr. Courtney and Dr. Moncada after being discharged from the hospital. Please schedule appointments with them for within 2 weeks of discharge. You must absolutely take aspirin and Brilinta as prescribed every day.  You cannot miss these medications as there is a high probability that you could develop blockages within the stents that could cause a massive heart attack. Doctor Antwan will change you from the expensive Brilinta blood thinner to a much cheaper similar medication called Plavix after 1 month when your Brilinta coupon expires. Please ensure this happens at your follow up appointments.     Please make sure to have a repeat echocardiogram 3 months after discharge to re-evaluate your heart function after receiving the stents and being on your medications.     It was a pleasure taking care of you during your hospitalization at [facility].  We wish you the best health going forward.

## 2019-05-06 LAB
POCT ACT-LR: 330 SEC (ref 113–149)
POCT TEST: ABNORMAL

## 2019-05-08 ENCOUNTER — TELEPHONE (OUTPATIENT)
Dept: CARDIOLOGY | Facility: CLINIC | Age: 74
End: 2019-05-08

## 2019-05-08 RX ORDER — LISINOPRIL 2.5 MG/1
2.5 TABLET ORAL DAILY
Qty: 30 TABLET | Refills: 0
Start: 2019-05-08 | End: 2019-05-09 | Stop reason: SINTOL

## 2019-05-08 NOTE — TELEPHONE ENCOUNTER
Spoke to pt. She has been having GI upset as well as diarrhea at times and SOB.  She believes it is due to all these new medications she is on and her extreme sensitivity to medications.  Has not missed any doses of her meds.  Saw her PCP who did an abdominal ultrasound and was reportedly negative.  Was given dicyclomine and it does help a little bit with her discomfort.    The shortness of breath is not her big concern though it is most likely due to Brilinta.  She does not have any swelling or orthopnea and her weight has not decreased and in fact has actually decreased.  Will see if the shortness of breath persists and if it does will then switch her to Effient.    She has also developed a dry cough recently.  Is on lisinopril 2.5 mg daily so have asked her to hold that medication.    She also noted that she has been taking metoprolol succinate 50 mg daily instead of the 25 she was discharged on.  She had declined to 25 mg tablets because Dr. Courtney had placed on the prescription already though she did not realize the dose had been decreased.  Advised her to cut the 50 mg in half and take only 25 mg of the metoprolol succinate as ordered at discharge.    Told her she can take an extra dose of metoprolol succinate this evening and take the med twice a day.    Also concerned because of the bruising on her left arm, which was initially just at her wrist, now extends to above her elbow and into her hand. Her left hand may be very slightly cooler than her right hand but both hands are cool. She has no significant tenderness at the left wrist puncture site and no drainage or decrease in ROM.    Pt will be seen tomorrow ar 12:40 PM, per Javi Anderson, please place pt on Dr Courtney's schedule for appt on 5/9 at 12:40 PM. Pt is aware of appt. Thank you.

## 2019-05-08 NOTE — TELEPHONE ENCOUNTER
Dr. Courtney and Dr. Moncada patient.    Patient was recently hospitalized with a NSTEMI and underwent stenting. She was started on a number of new medications including ASA, Brilinta, Crestor, Metoprolol and Lisinopril. She has always been sensitive to medications and since beginning the above medications she has developed nausea, stomach pain and SOB. She saw her PCP on Monday and reported the above symptoms. An abdominal ultrasound was performed and according to the patient was unremarkable. Her PCP prescribed Bentyl which she started on Monday, no improvement in her symptoms yet. I told her that her SOB was likely related to her Brilinta. I told her that sometimes the SOB will improve after taking Brilinta for awhile. We also discussed the importance of DAPT at this time.    Patient can be reached at [phone] to discuss your thoughts/recommendations, thanks.

## 2019-05-09 ENCOUNTER — OFFICE VISIT (OUTPATIENT)
Dept: CARDIOLOGY | Facility: CLINIC | Age: 74
End: 2019-05-09
Payer: MEDICARE

## 2019-05-09 VITALS
HEIGHT: 67 IN | BODY MASS INDEX: 23.39 KG/M2 | HEART RATE: 72 BPM | SYSTOLIC BLOOD PRESSURE: 105 MMHG | WEIGHT: 149 LBS | DIASTOLIC BLOOD PRESSURE: 60 MMHG | RESPIRATION RATE: 15 BRPM

## 2019-05-09 DIAGNOSIS — I21.4 NSTEMI (NON-ST ELEVATED MYOCARDIAL INFARCTION) (CMS/HCC): ICD-10-CM

## 2019-05-09 DIAGNOSIS — E78.00 HYPERCHOLESTEREMIA: ICD-10-CM

## 2019-05-09 DIAGNOSIS — I25.5 ISCHEMIC CARDIOMYOPATHY: Primary | ICD-10-CM

## 2019-05-09 DIAGNOSIS — Z95.5 STATUS POST INSERTION OF DRUG ELUTING CORONARY ARTERY STENT: ICD-10-CM

## 2019-05-09 DIAGNOSIS — Z95.5 S/P DRUG ELUTING CORONARY STENT PLACEMENT: ICD-10-CM

## 2019-05-09 PROCEDURE — 93000 ELECTROCARDIOGRAM COMPLETE: CPT | Performed by: INTERNAL MEDICINE

## 2019-05-09 PROCEDURE — 99215 OFFICE O/P EST HI 40 MIN: CPT | Performed by: INTERNAL MEDICINE

## 2019-05-09 RX ORDER — PRASUGREL 10 MG/1
10 TABLET, FILM COATED ORAL DAILY
Qty: 90 TABLET | Refills: 4 | Status: SHIPPED | OUTPATIENT
Start: 2019-05-09 | End: 2020-07-10 | Stop reason: SDUPTHER

## 2019-05-09 RX ORDER — ROSUVASTATIN CALCIUM 20 MG/1
20 TABLET, COATED ORAL DAILY
Qty: 90 TABLET | Refills: 4 | Status: SHIPPED | OUTPATIENT
Start: 2019-05-09 | End: 2020-07-20

## 2019-05-09 RX ORDER — DICYCLOMINE HYDROCHLORIDE 10 MG/1
10 CAPSULE ORAL 4 TIMES DAILY PRN
Qty: 360 CAPSULE | Refills: 1 | COMMUNITY
Start: 2019-05-09 | End: 2019-11-12 | Stop reason: ALTCHOICE

## 2019-05-09 NOTE — PROGRESS NOTES
Tomasz Courtney MD, [facility]    Director, Clinical Cardiology-[facility] and  [facility]    The [employer] Chair in Innovative Cardiology    Clinical Associate Professor of Medicine  [facility]  [facility]  [address]  [address]  [place] [address]    TEL  [phone]  FAX: [phone]  EMAIL: [email or web address]   May 9, 2019    Anitra Way MD  [address] [place] [address]    Noé Moncada MD  Inteventional Cardiology  [facility] [place]      Jennifer Sams  1945      Dear Colleagues,      I saw Jennifer Sams in my office today on May 9, 2019.    She is a 74 y.o. female with a history of exertional angina, hypercholesterolemia with a high HDL cholesterol, and osteoarthritis of her hips who presents today for her posthospitalization follow-up visit after the identification triple-vessel coronary artery disease and multiple percutaneous coronary interventions.    She was hospitalized at [facility] on 4/26/19 after experiencing recurrent unprovoked retrosternal chest discomfort which was relieved by sublingual nitroglycerin prescribed on her last visit.    Her initial electrocardiogram was normal but new T wave inversions in the anterior leads developed and her troponins were elevated at 0.12 on presentation peaking at 0.59 after 24 hours consistent with a non-ST segment elevation myocardial infarction.  Heparin was initiated and rosuvastatin was increased to 20 mg a day.    Echocardiography (4/27/19), which I personally reviewed, was a technically difficult study but revealed akinesis/dyskinesis in the mid and apical segments with a moderately depressed ejection fraction.    Coronary angiography (4/29/19), which I personally reviewed, revealed three-vessel coronary disease with subtotal  obstruction of the mid LAD and a long 80% mid to distal LAD stenosis, multiple subtotal stenoses of her right coronary artery to its proximal mid and distal portions, and a subtotal stenosis in the proximal to mid circumflex marginal.  The left ventricular end-diastolic pressure was elevated as well 23 mmHg.    Her LAD stenoses were reconciled at the time of her diagnostic catheterization with plans to bring her back to the catheterization laboratory to reconcile her other stenoses.    On 4/30/19 all 3 of her right coronary stenoses were reconciled with 4 drug-eluting stents.    She returned to the catheterization laboratory on 5/2/19 and underwent stenting of her left circumflex stenosis.    Dual antiplatelet therapy with aspirin and ticagrelor was prescribed.  She learned that ticagrelor records nearly $500 a month and she wishes to switch to an alternative option.    She developed a cough prior to the initiation of enalapril which was discontinued yesterday.  It has been added to her regimen due to her impaired LV function noted on the echocardiogram performed prior to her revascularization.    She also has been experiencing abdominal distress and episodes of diarrhea interspersed with constipation for which you ordered an ultrasound which was negative.  You prescribed dicyclomine 10 mg 3 times a day which has been helpful in improving her symptoms.    She also noted an enlarging ecchymosis of her left forearm from her radial artery access.    She has noted that she remains short of breath going upstairs but has had no recurrence of the tightness in her chest which triggered her evaluation to see me and her hospitalization.    She has lost 7 pounds since her last visit.    She has been taking her rosuvastatin 20 mg a day regularly without side effects such as myalgias or significant muscle cramping.     ALLERGIES:  Allergies   Allergen Reactions   • Ace Inhibitors Other (see comments)     cough   • Atorvastatin   "    Nausea and can'trecall   • Codeine      Nausea and Vomiting   • Dilaudid [Hydromorphone]      Nausea & vomiting   • Oxycodone      Nausea & vomiting, dizziness   • Morphine Sulfate Nausea Only and GI intolerance        I have reviewed the problem list, allergies, medications and social history, and they are up to date in the electronic record as of the current encounter.     ROS:  The remainder of the review of systems is otherwise negative in detail for significant complaints.    No changes in her medications have been made since the last visit.    PHYSICAL EXAM:  VS: /60   Pulse 72   Resp 15   Ht 1.702 m (5' 7\")   Wt 67.6 kg (149 lb)   BMI 23.34 kg/m²   General: well-developed, well-nourished, appears well, no acute distress  HEENT: sclera anicteric, conjunctiva pink, no xanthelasma, neck supple, no thyromegaly  Chest: clear to auscultation, symmetrical expansion, no scoliosis   Heart: Apical impulse normally situated, regular rhythm, normal S1, normal S2, no gallops, no murmur  JVP: normal jugular venous pressure, negative AJR  Vascular: carotid pulses: intact bilaterally, no bruit, peripheral pulses: intact bilaterally  Abd: soft, non-tender, no hepatosplenomegaly, abdominal aorta not palpable  Ext: Ecchymosis left forearm, radial artery access site normal, normal radial pulse, small ecchymosis left femoral artery access site, no edema, no clubbing, no cyanosis  Skin: warm, dry, no ecchymoses  Neuro: alert, oriented x 3, normal muscle strength  Psychiatric: normal affect, normal judgment, normal insight and normal memory      LABS:     Ref. Range 5/3/2019 09:08   Sodium Latest Ref Range: 136 - 144 mEQ/L 138   Potassium Latest Ref Range: 3.6 - 5.1 mEQ/L 4.1   Chloride Latest Ref Range: 98 - 109 mEQ/L 104   CO2 Latest Ref Range: 22 - 32 mEQ/L 21 (L)   BUN Latest Ref Range: 8 - 20 mg/dL 11   Creatinine Latest Ref Range: 0.6 - 1.1 mg/dL 0.8   Glucose Latest Ref Range: 70 - 99 mg/dL 103 (H)   Calcium " Latest Ref Range: 8.9 - 10.3 mg/dL 8.9   eGFR Latest Ref Range: >=60.0 mL/min/1.73m*2 >60.0   Anion Gap Latest Ref Range: 3 - 15 mEQ/L 13   Magnesium Latest Ref Range: 1.8 - 2.5 mg/dL 2.1   WBC Latest Ref Range: 3.80 - 10.50 K/uL 7.21   RBC Latest Ref Range: 3.93 - 5.22 M/uL 4.52   Hemoglobin Latest Ref Range: 11.8 - 15.7 g/dL 13.8   Hematocrit Latest Ref Range: 35.0 - 45.0 % 40.5   MCV Latest Ref Range: 83.0 - 98.0 fL 89.6   MCH Latest Ref Range: 28.0 - 33.2 pg 30.5   MCHC Latest Ref Range: 32.2 - 35.5 g/dL 34.1   RDW Latest Ref Range: 11.7 - 14.4 % 13.0   Platelets Latest Ref Range: 150 - 369 K/uL 232   MPV Latest Ref Range: 9.4 - 12.3 fL 9.5           ECG:  The electrocardiogram obtained today on 5/9/19 revealed Sinus  Rhythm at a rate of 72.  Extensive T-abnormality  - Anterior/lateral and inferior ischemia.  When compared with the most recent ECG of 4/19/19 t extensive T wave abnormalities now present.    IMPRESSION & PLAN:  Non-STEMI 4/26/19; three-vessel CAD; status post FRANK x2 LAD 4/27/19; DESx3 RCA 4/29/19; FRANK x1 LCX OM2:  She reports no recurrence of her anginal symptoms.  She still experiences exertional dyspnea which may be a consequence of her deconditioning and her elevated LVEDP noted at the time of her catheterization.  I explained the importance of continuing dual antiplatelet therapy for at least a year.  I will replace Brilinta with prasagrel after her Brilinta prescription is completed.  She will continue low-dose aspirin.  I discussed the importance of a healthy diet.  She responded that she is always eaten a certain way and does not wish to change her eating habits.  I have also prescribed a cardiac rehabilitation consultation for her.  She will be told where such a facility is near her home.  She has been using chlorpheniramine and pseudoephedrine for allergic rhinitis.  She was told to avoid pseudephedrine due to its adverse cardiovascular impact.    Ischemic cardiomyopathy:  Reduced LVEF on  preintervention echocardiogram.  Her blood pressure currently precludes the addition of an ARB to replace her low-dose ACE inhibitor which has been discontinued due to ACE inhibitor cough.  This has been added to her list of drug intolerances.  I will continue low-dose metoprolol XL and reassess her LV function on the day of her return visit in a month.     Hypercholesterolemia:  She will continue 20 mg of rosuvastatin daily.     Abdominal pain and diarrhea interspersed with constipation:  I believe the symptoms are functional in nature due to the stress associated with her recent hospitalization.  I recommended she continue dicyclomine for the near term.    The aforementioned changes were made in her regimen today.  Listed below you will find the regimen that she will be requested to continue:      Current Outpatient Prescriptions:   •  aspirin (ASPIR-81) 81 mg enteric coated tablet, Take 1 tablet (81 mg total) by mouth daily., Disp: 90 tablet, Rfl: 5  •  metoprolol succinate XL (TOPROL-XL) 25 mg 24 hr tablet, Take 1 tablet (25 mg total) by mouth daily., Disp: 30 tablet, Rfl: 0  •  multivitamin tablet, Take 1 tablet by mouth daily., Disp: , Rfl:   •  nitroglycerin (NITROSTAT) 0.4 mg SL tablet, Place 1 tablet (0.4 mg total) under the tongue every 5 (five) minutes as needed for chest pain., Disp: 25 tablet, Rfl: 4  •  pantoprazole (PROTONIX) 40 mg EC tablet, Take 40 mg by mouth daily., Disp: , Rfl:   •  rosuvastatin (CRESTOR) 20 mg tablet, Take 1 tablet (20 mg total) by mouth daily., Disp: 90 tablet, Rfl: 4  •  ticagrelor (BRILINTA) 90 mg tablet, Take 1 tablet (90 mg total) by mouth 2 (two) times a day. After completing replace with prasugrel 10 mg daily, Disp: 60 tablet, Rfl: 0  •  dicyclomine (BENTYL) 10 mg capsule, Take 1 capsule (10 mg total) by mouth 4 (four) times a day (before meals and nightly)., Disp: 360 capsule, Rfl: 1  •  prasugrel (EFFIENT) 10 mg tablet, Take 1 tablet (10 mg total) by mouth daily., Disp:  90 tablet, Rfl: 4    If there are no new interval cardiovascular problems, I will see her again in 1 month.    I sincerely appreciate the opportunity to participate in the care of your patients. Please do not hesitate to contact me if any questions or problems arise.     With best wishes and warmest personal regards.      Sincerely,    Tomasz Courtney MD, [facility]    This document was generated utilizing voice recognition technology. A reasonable attempt at proofreading has been made to minimize errors but please excuse any typographical errors which may be present. Please call with any questions.    >40 minutes of encounter time spent with patient.  Greater than 50% of the visit time spent discussing the following topics: test results, management, risk reduction, and risk/benefits of care.

## 2019-05-09 NOTE — LETTER
Tomasz Courtney MD, [facility]    Director, Clinical Cardiology-[facility] and  [facility]    The Neno Canela [employer] Chair in Innovative Cardiology    Clinical Associate Professor of Medicine  [facility]  [facility]  [facility]  [address]  [address]  [place] [address]    TEL  [phone]  FAX: [phone]  EMAIL: [email or web address]   May 9, 2019    Anitra Way MD  [address] [place] [address]    Noé Moncada MD  Inteventional Cardiology  [facility]      Jennifer Sams  1945      Dear Colleagues,      I saw Jennifer Sams in my office today on May 9, 2019.    She is a 74 y.o. female with a history of exertional angina, hypercholesterolemia with a high HDL cholesterol, and osteoarthritis of her hips who presents today for her posthospitalization follow-up visit after the identification triple-vessel coronary artery disease and multiple percutaneous coronary interventions.    She was hospitalized at [facility] on 4/26/19 after experiencing recurrent unprovoked retrosternal chest discomfort which was relieved by sublingual nitroglycerin prescribed on her last visit.    Her initial electrocardiogram was normal but new T wave inversions in the anterior leads developed and her troponins were elevated at 0.12 on presentation peaking at 0.59 after 24 hours consistent with a non-ST segment elevation myocardial infarction.  Heparin was initiated and rosuvastatin was increased to 20 mg a day.    Echocardiography (4/27/19), which I personally reviewed, was a technically difficult study but revealed akinesis/dyskinesis in the mid and apical segments with a moderately depressed ejection fraction.    Coronary angiography (4/29/19), which I personally reviewed, revealed three-vessel coronary disease with subtotal  obstruction of the mid LAD and a long 80% mid to distal LAD stenosis, multiple subtotal stenoses of her right coronary artery to its proximal mid and distal portions, and a subtotal stenosis in the proximal to mid circumflex marginal.  The left ventricular end-diastolic pressure was elevated as well 23 mmHg.    Her LAD stenoses were reconciled at the time of her diagnostic catheterization with plans to bring her back to the catheterization laboratory to reconcile her other stenoses.    On 4/30/19 all 3 of her right coronary stenoses were reconciled with 4 drug-eluting stents.    She returned to the catheterization laboratory on 5/2/19 and underwent stenting of her left circumflex stenosis.    Dual antiplatelet therapy with aspirin and ticagrelor was prescribed.  She learned that ticagrelor records nearly $500 a month and she wishes to switch to an alternative option.    She developed a cough prior to the initiation of enalapril which was discontinued yesterday.  It has been added to her regimen due to her impaired LV function noted on the echocardiogram performed prior to her revascularization.    She also has been experiencing abdominal distress and episodes of diarrhea interspersed with constipation for which you ordered an ultrasound which was negative.  You prescribed dicyclomine 10 mg 3 times a day which has been helpful in improving her symptoms.    She also noted an enlarging ecchymosis of her left forearm from her radial artery access.    She has noted that she remains short of breath going upstairs but has had no recurrence of the tightness in her chest which triggered her evaluation to see me and her hospitalization.    She has lost 7 pounds since her last visit.    She has been taking her rosuvastatin 20 mg a day regularly without side effects such as myalgias or significant muscle cramping.     ALLERGIES:  Allergies   Allergen Reactions   • Ace Inhibitors Other (see comments)     cough   • Atorvastatin   "    Nausea and can'trecall   • Codeine      Nausea and Vomiting   • Dilaudid [Hydromorphone]      Nausea & vomiting   • Oxycodone      Nausea & vomiting, dizziness   • Morphine Sulfate Nausea Only and GI intolerance        I have reviewed the problem list, allergies, medications and social history, and they are up to date in the electronic record as of the current encounter.     ROS:  The remainder of the review of systems is otherwise negative in detail for significant complaints.    No changes in her medications have been made since the last visit.    PHYSICAL EXAM:  VS: /60   Pulse 72   Resp 15   Ht 1.702 m (5' 7\")   Wt 67.6 kg (149 lb)   BMI 23.34 kg/m²   General: well-developed, well-nourished, appears well, no acute distress  HEENT: sclera anicteric, conjunctiva pink, no xanthelasma, neck supple, no thyromegaly  Chest: clear to auscultation, symmetrical expansion, no scoliosis   Heart: Apical impulse normally situated, regular rhythm, normal S1, normal S2, no gallops, no murmur  JVP: normal jugular venous pressure, negative AJR  Vascular: carotid pulses: intact bilaterally, no bruit, peripheral pulses: intact bilaterally  Abd: soft, non-tender, no hepatosplenomegaly, abdominal aorta not palpable  Ext: Ecchymosis left forearm, radial artery access site normal, normal radial pulse, small ecchymosis left femoral artery access site, no edema, no clubbing, no cyanosis  Skin: warm, dry, no ecchymoses  Neuro: alert, oriented x 3, normal muscle strength  Psychiatric: normal affect, normal judgment, normal insight and normal memory      LABS:     Ref. Range 5/3/2019 09:08   Sodium Latest Ref Range: 136 - 144 mEQ/L 138   Potassium Latest Ref Range: 3.6 - 5.1 mEQ/L 4.1   Chloride Latest Ref Range: 98 - 109 mEQ/L 104   CO2 Latest Ref Range: 22 - 32 mEQ/L 21 (L)   BUN Latest Ref Range: 8 - 20 mg/dL 11   Creatinine Latest Ref Range: 0.6 - 1.1 mg/dL 0.8   Glucose Latest Ref Range: 70 - 99 mg/dL 103 (H)   Calcium " Latest Ref Range: 8.9 - 10.3 mg/dL 8.9   eGFR Latest Ref Range: >=60.0 mL/min/1.73m*2 >60.0   Anion Gap Latest Ref Range: 3 - 15 mEQ/L 13   Magnesium Latest Ref Range: 1.8 - 2.5 mg/dL 2.1   WBC Latest Ref Range: 3.80 - 10.50 K/uL 7.21   RBC Latest Ref Range: 3.93 - 5.22 M/uL 4.52   Hemoglobin Latest Ref Range: 11.8 - 15.7 g/dL 13.8   Hematocrit Latest Ref Range: 35.0 - 45.0 % 40.5   MCV Latest Ref Range: 83.0 - 98.0 fL 89.6   MCH Latest Ref Range: 28.0 - 33.2 pg 30.5   MCHC Latest Ref Range: 32.2 - 35.5 g/dL 34.1   RDW Latest Ref Range: 11.7 - 14.4 % 13.0   Platelets Latest Ref Range: 150 - 369 K/uL 232   MPV Latest Ref Range: 9.4 - 12.3 fL 9.5           ECG:  The electrocardiogram obtained today on 5/9/19 revealed Sinus  Rhythm at a rate of 72.  Extensive T-abnormality  - Anterior/lateral and inferior ischemia.  When compared with the most recent ECG of 4/19/19 t extensive T wave abnormalities now present.    IMPRESSION & PLAN:  Non-STEMI 4/26/19; three-vessel CAD; status post FRANK x2 LAD 4/27/19; DESx3 RCA 4/29/19; FRANK x1 LCX OM2:  She reports no recurrence of her anginal symptoms.  She still experiences exertional dyspnea which may be a consequence of her deconditioning and her elevated LVEDP noted at the time of her catheterization.  I explained the importance of continuing dual antiplatelet therapy for at least a year.  I will replace Brilinta with prasagrel after her Brilinta prescription is completed.  She will continue low-dose aspirin.  I discussed the importance of a healthy diet.  She responded that she is always eaten a certain way and does not wish to change her eating habits.  I have also prescribed a cardiac rehabilitation consultation for her.  She will be told where such a facility is near her home.  She has been using chlorpheniramine and pseudoephedrine for allergic rhinitis.  She was told to avoid pseudephedrine due to its adverse cardiovascular impact.    Ischemic cardiomyopathy:  Reduced LVEF on  preintervention echocardiogram.  Her blood pressure currently precludes the addition of an ARB to replace her low-dose ACE inhibitor which has been discontinued due to ACE inhibitor cough.  This has been added to her list of drug intolerances.  I will continue low-dose metoprolol XL and reassess her LV function on the day of her return visit in a month.     Hypercholesterolemia:  She will continue 20 mg of rosuvastatin daily.     Abdominal pain and diarrhea interspersed with constipation:  I believe the symptoms are functional in nature due to the stress associated with her recent hospitalization.  I recommended she continue dicyclomine for the near term.    The aforementioned changes were made in her regimen today.  Listed below you will find the regimen that she will be requested to continue:      Current Outpatient Prescriptions:   •  aspirin (ASPIR-81) 81 mg enteric coated tablet, Take 1 tablet (81 mg total) by mouth daily., Disp: 90 tablet, Rfl: 5  •  metoprolol succinate XL (TOPROL-XL) 25 mg 24 hr tablet, Take 1 tablet (25 mg total) by mouth daily., Disp: 30 tablet, Rfl: 0  •  multivitamin tablet, Take 1 tablet by mouth daily., Disp: , Rfl:   •  nitroglycerin (NITROSTAT) 0.4 mg SL tablet, Place 1 tablet (0.4 mg total) under the tongue every 5 (five) minutes as needed for chest pain., Disp: 25 tablet, Rfl: 4  •  pantoprazole (PROTONIX) 40 mg EC tablet, Take 40 mg by mouth daily., Disp: , Rfl:   •  rosuvastatin (CRESTOR) 20 mg tablet, Take 1 tablet (20 mg total) by mouth daily., Disp: 90 tablet, Rfl: 4  •  ticagrelor (BRILINTA) 90 mg tablet, Take 1 tablet (90 mg total) by mouth 2 (two) times a day. After completing replace with prasugrel 10 mg daily, Disp: 60 tablet, Rfl: 0  •  dicyclomine (BENTYL) 10 mg capsule, Take 1 capsule (10 mg total) by mouth 4 (four) times a day (before meals and nightly)., Disp: 360 capsule, Rfl: 1  •  prasugrel (EFFIENT) 10 mg tablet, Take 1 tablet (10 mg total) by mouth daily., Disp:  90 tablet, Rfl: 4    If there are no new interval cardiovascular problems, I will see her again in 1 month.    I sincerely appreciate the opportunity to participate in the care of your patients. Please do not hesitate to contact me if any questions or problems arise.     With best wishes and warmest personal regards.      Sincerely,    Tomasz Courtney MD, [facility]    This document was generated utilizing voice recognition technology. A reasonable attempt at proofreading has been made to minimize errors but please excuse any typographical errors which may be present. Please call with any questions.    >40 minutes of encounter time spent with patient.  Greater than 50% of the visit time spent discussing the following topics: test results, management, risk reduction, and risk/benefits of care.

## 2019-05-20 ENCOUNTER — TRANSCRIBE ORDERS (OUTPATIENT)
Dept: CARDIAC REHAB | Facility: HOSPITAL | Age: 74
End: 2019-05-20

## 2019-05-22 ENCOUNTER — TELEPHONE (OUTPATIENT)
Dept: SCHEDULING | Facility: CLINIC | Age: 74
End: 2019-05-22

## 2019-05-22 DIAGNOSIS — Z79.02 LONG TERM (CURRENT) USE OF ANTITHROMBOTICS/ANTIPLATELETS: ICD-10-CM

## 2019-05-22 DIAGNOSIS — E78.00 HYPERCHOLESTEREMIA: ICD-10-CM

## 2019-05-22 DIAGNOSIS — R06.09 EXERTIONAL DYSPNEA: Primary | ICD-10-CM

## 2019-05-22 DIAGNOSIS — Z79.82 LONG TERM (CURRENT) USE OF ASPIRIN: ICD-10-CM

## 2019-05-22 DIAGNOSIS — R05.9 COUGHING: ICD-10-CM

## 2019-05-22 DIAGNOSIS — R79.9 ABNORMAL BLOOD CHEMISTRY: ICD-10-CM

## 2019-05-22 DIAGNOSIS — Z95.5 S/P DRUG ELUTING CORONARY STENT PLACEMENT: ICD-10-CM

## 2019-05-22 DIAGNOSIS — Z79.899 ENCOUNTER FOR LONG-TERM (CURRENT) USE OF HIGH-RISK MEDICATION: ICD-10-CM

## 2019-05-22 DIAGNOSIS — R11.10 VOMITING, INTRACTABILITY OF VOMITING NOT SPECIFIED, PRESENCE OF NAUSEA NOT SPECIFIED, UNSPECIFIED VOMITING TYPE: ICD-10-CM

## 2019-05-22 DIAGNOSIS — R03.0 ELEVATED BLOOD-PRESSURE READING WITHOUT DIAGNOSIS OF HYPERTENSION: ICD-10-CM

## 2019-05-22 NOTE — TELEPHONE ENCOUNTER
Spoke to patient about 4 PM.  She reports that she is still coughing and it is getting worse.  The coughing is so bad that she is choking and just about throwing up from the coughing.  There is no blood.      Stopping the lisinopril made absolutely no difference on her cough.  She is also short of breath with any exertion and has to stop when she climbs stairs.  No edema or orthopnea.    She has also noted some spontaneous bruising and is all black and blue on the Brilinta and Effient.  Would normally just have her switch to the Effient and there was 60 mg load and then 10 mg daily since the medication for antiplatelet therapy is being changed.  She has also been having nosebleeds since she was hospitalized.  There are small clots that come out when she blows her nose.  However given the spontaneous bruising and the worsening cough need to assess with the chest x-ray and a CBC with differential before recommending making the change.  We will also order a BNP and a CMP given patient's multiple complaints.    She denies any abdominal pain or discomfort and her appetite is good.  There is no noted bleeding.    Reviewed with Dr. Courtney and will have her stop her metoprolol succinate 25 mg daily (taking one half of a 50 mg tablet) in case it is making her bronchospastic and causing her cough.    Lab and radiology orders are in the system and can be pulled up at outpatient registration at [place] tomorrow.

## 2019-05-22 NOTE — TELEPHONE ENCOUNTER
Pt would like to discuss chronic coughing with Alicia, pt states that the coughing is getting worse to the point she vomits. Pt can be reached at [phone]

## 2019-05-23 ENCOUNTER — TELEPHONE (OUTPATIENT)
Dept: SCHEDULING | Facility: CLINIC | Age: 74
End: 2019-05-23

## 2019-05-23 ENCOUNTER — HOSPITAL ENCOUNTER (OUTPATIENT)
Dept: RADIOLOGY | Age: 74
Discharge: HOME | End: 2019-05-23
Attending: NURSE PRACTITIONER
Payer: MEDICARE

## 2019-05-23 ENCOUNTER — APPOINTMENT (OUTPATIENT)
Dept: LAB | Age: 74
End: 2019-05-23
Attending: NURSE PRACTITIONER
Payer: MEDICARE

## 2019-05-23 DIAGNOSIS — R05.9 COUGHING: ICD-10-CM

## 2019-05-23 DIAGNOSIS — Z95.5 STATUS POST INSERTION OF DRUG ELUTING CORONARY ARTERY STENT: ICD-10-CM

## 2019-05-23 DIAGNOSIS — Z79.82 LONG TERM (CURRENT) USE OF ASPIRIN: ICD-10-CM

## 2019-05-23 DIAGNOSIS — R11.10 VOMITING, INTRACTABILITY OF VOMITING NOT SPECIFIED, PRESENCE OF NAUSEA NOT SPECIFIED, UNSPECIFIED VOMITING TYPE: ICD-10-CM

## 2019-05-23 DIAGNOSIS — Z79.02 LONG TERM (CURRENT) USE OF ANTITHROMBOTICS/ANTIPLATELETS: ICD-10-CM

## 2019-05-23 DIAGNOSIS — R23.3 SPONTANEOUS BRUISING: ICD-10-CM

## 2019-05-23 DIAGNOSIS — R06.09 EXERTIONAL DYSPNEA: ICD-10-CM

## 2019-05-23 DIAGNOSIS — Z95.5 S/P DRUG ELUTING CORONARY STENT PLACEMENT: ICD-10-CM

## 2019-05-23 DIAGNOSIS — Z79.02 ENCOUNTER FOR LONG-TERM USE OF ANTIPLATELETS/ANTITHROMBOTICS: ICD-10-CM

## 2019-05-23 DIAGNOSIS — R03.0 ELEVATED BLOOD-PRESSURE READING WITHOUT DIAGNOSIS OF HYPERTENSION: ICD-10-CM

## 2019-05-23 DIAGNOSIS — Z79.899 ENCOUNTER FOR LONG-TERM (CURRENT) USE OF HIGH-RISK MEDICATION: ICD-10-CM

## 2019-05-23 DIAGNOSIS — I25.5 ISCHEMIC CARDIOMYOPATHY: ICD-10-CM

## 2019-05-23 DIAGNOSIS — R06.09 EXERTIONAL DYSPNEA: Primary | ICD-10-CM

## 2019-05-23 DIAGNOSIS — E78.00 HYPERCHOLESTEREMIA: ICD-10-CM

## 2019-05-23 DIAGNOSIS — R79.9 ABNORMAL BLOOD CHEMISTRY: ICD-10-CM

## 2019-05-23 LAB
ALBUMIN SERPL-MCNC: 3.8 G/DL (ref 3.4–5)
ALP SERPL-CCNC: 71 IU/L (ref 35–126)
ALT SERPL-CCNC: 16 IU/L (ref 11–54)
ANION GAP SERPL CALC-SCNC: 8 MEQ/L (ref 3–15)
AST SERPL-CCNC: 23 IU/L (ref 15–41)
BASOPHILS # BLD: 0.05 K/UL (ref 0.01–0.1)
BASOPHILS NFR BLD: 0.8 %
BILIRUB SERPL-MCNC: 0.8 MG/DL (ref 0.3–1.2)
BNP SERPL-MCNC: 86 PG/ML
BUN SERPL-MCNC: 7 MG/DL (ref 8–20)
CALCIUM SERPL-MCNC: 9.3 MG/DL (ref 8.9–10.3)
CHLORIDE SERPL-SCNC: 109 MEQ/L (ref 98–109)
CO2 SERPL-SCNC: 25 MEQ/L (ref 22–32)
CREAT SERPL-MCNC: 0.8 MG/DL
DIFFERENTIAL METHOD BLD: NORMAL
EOSINOPHIL # BLD: 0.16 K/UL (ref 0.04–0.36)
EOSINOPHIL NFR BLD: 2.5 %
ERYTHROCYTE [DISTWIDTH] IN BLOOD BY AUTOMATED COUNT: 13.3 % (ref 11.7–14.4)
GFR SERPL CREATININE-BSD FRML MDRD: >60 ML/MIN/1.73M*2
GLUCOSE SERPL-MCNC: 102 MG/DL (ref 70–99)
HCT VFR BLDCO AUTO: 41.3 %
HGB BLD-MCNC: 13.7 G/DL
IMM GRANULOCYTES # BLD AUTO: 0.02 K/UL (ref 0–0.08)
IMM GRANULOCYTES NFR BLD AUTO: 0.3 %
LYMPHOCYTES # BLD: 1.33 K/UL (ref 1.2–3.5)
LYMPHOCYTES NFR BLD: 21 %
MCH RBC QN AUTO: 30.6 PG (ref 28–33.2)
MCHC RBC AUTO-ENTMCNC: 33.2 G/DL (ref 32.2–35.5)
MCV RBC AUTO: 92.4 FL (ref 83–98)
MONOCYTES # BLD: 0.58 K/UL (ref 0.28–0.8)
MONOCYTES NFR BLD: 9.2 %
NEUTROPHILS # BLD: 4.19 K/UL (ref 1.7–7)
NEUTS SEG NFR BLD: 66.2 %
NRBC BLD-RTO: 0 %
PDW BLD AUTO: 9 FL (ref 9.4–12.3)
PLATELET # BLD AUTO: 224 K/UL
POTASSIUM SERPL-SCNC: 3.8 MEQ/L (ref 3.6–5.1)
PROT SERPL-MCNC: 6.3 G/DL (ref 6–8.2)
RBC # BLD AUTO: 4.47 M/UL (ref 3.93–5.22)
SODIUM SERPL-SCNC: 142 MEQ/L (ref 136–144)
WBC # BLD AUTO: 6.33 K/UL

## 2019-05-23 PROCEDURE — 80061 LIPID PANEL: CPT | Performed by: NURSE PRACTITIONER

## 2019-05-23 PROCEDURE — 71046 X-RAY EXAM CHEST 2 VIEWS: CPT

## 2019-05-23 PROCEDURE — 83880 ASSAY OF NATRIURETIC PEPTIDE: CPT

## 2019-05-23 PROCEDURE — 85025 COMPLETE CBC W/AUTO DIFF WBC: CPT

## 2019-05-23 PROCEDURE — 36415 COLL VENOUS BLD VENIPUNCTURE: CPT

## 2019-05-23 PROCEDURE — 80053 COMPREHEN METABOLIC PANEL: CPT

## 2019-05-23 NOTE — TELEPHONE ENCOUNTER
Chest xray and labs from today are fine. Spoke to pt and informed her of results. Her chest xray is normal except for a small hiatal hernia. Her labs are fine with normal CBC, BMP WNL except for a mildly elevated fasting glucose of 102.    She reports that she does still have the coughing though maybe not as bad. Has not done much at present to see if the cough and SOB worsen. States the cough is not due to her allergies, because she has not coughed with them before when she did not take her Sudafed, which is the only thing that has worked for her in the past.    She can change from Brilinta to Effient but she wants to wait for another day to see how she does off the metoprolol succinate

## 2019-05-23 NOTE — TELEPHONE ENCOUNTER
Rec'd a call from Chastity who is calling to inform that the office does not perform stat labs and requesting to have a new order for a CMP and also a CBC without it stating stat.      Please send electronically (pts chart)     Any questions, contact Chastity at

## 2019-05-23 NOTE — TELEPHONE ENCOUNTER
Spoke to patient at 8:32 AM and she is going for studies this a.m.  Was coughing last night but it was just getting up to this a.m. so the nausea if she was still coughing.

## 2019-05-23 NOTE — TELEPHONE ENCOUNTER
Called and spoke to Chastity in Registration. Explained that I had been in with patients so I could not send the Rx. The CBC was drawn but not processed - will put in an ordere for a regular CBC with differential, which will be sent to [facility] so results will not be available until this evening.

## 2019-05-23 NOTE — TELEPHONE ENCOUNTER
Chastity calling again to follow up on status of order. Pt's blood has already been drawn. Order is needed for processing only.     Chastity can be reached at [phone]

## 2019-05-24 LAB
CHOLEST SERPL-MCNC: 142 MG/DL
HDLC SERPL-MCNC: 57 MG/DL
HDLC SERPL: 2.5 {RATIO}
LDLC SERPL CALC-MCNC: 64 MG/DL
NONHDLC SERPL-MCNC: 85 MG/DL
TRIGL SERPL-MCNC: 105 MG/DL (ref 30–149)

## 2019-05-24 NOTE — TELEPHONE ENCOUNTER
Call received from the patient. She told me that Alicia DON asked her to call the office again today with an update on her cough. Her cough remains an issue. I asked her if it was better, worse or the same as yesterday and she told me may be slightly better.     She asked whether her lipid panel results were available and I told her that it appeared that this still needed to be collected according to the lab work section of her chart. She told me that she had her lipid panel done with her recent lab work and that Alicia was aware of that.    Call back # [phone].

## 2019-05-25 NOTE — TELEPHONE ENCOUNTER
Spoke to pt. She is still very SOB, sumit with her cat being sick. Her chest xray and labs were fine. Will switch to prasugrel tomorrow with a loading dose of 60 mg (12 hours after last Brilinta dose)then will start 10 mg daily starting on Sunday, 5/25/2019.    Added lipid panel on to her labs so they will be run and results available on Tuesday too her.

## 2019-05-25 NOTE — TELEPHONE ENCOUNTER
Called and added on lipid panel which will be done since there is enough blood from yesterday's phlebotomy when I called specimen processing.  New order placed in the chart for this lipid panel.

## 2019-06-05 ENCOUNTER — TELEPHONE (OUTPATIENT)
Dept: SCHEDULING | Facility: CLINIC | Age: 74
End: 2019-06-05

## 2019-06-05 NOTE — TELEPHONE ENCOUNTER
Pt would like to speak to Alicia re: labs. Pt would like to go over lipid panel. Pt can be reached at [phone].

## 2019-06-05 NOTE — TELEPHONE ENCOUNTER
Called and spoke to patient.  She questioned if she was supposed to have another lipid panel done.  I reviewed with her that she had it done 2 weeks ago and did not need to have it repeated.    Reports that her shortness of breath has improved with changing from Brilinta to Effient.  However her cough persists.  Questionably related to allergies? She is no longer on lisinopril.

## 2019-06-06 NOTE — TELEPHONE ENCOUNTER
Left voicemail message for patient that Dr. Courtney recommends that she check with her internist concerning the ongoing cough.  She had a normal BNP and her x-ray was fine and Dr Courtney does not believe it is due to her meds, especially as she is off the lisinopril also.    She has an appointment on Tuesday with Dr. Courtney.

## 2019-06-06 NOTE — TELEPHONE ENCOUNTER
She should speak to her primary care physician in regard to her cough.  It is not related to the medication that she is receiving.

## 2019-06-11 ENCOUNTER — HOSPITAL ENCOUNTER (OUTPATIENT)
Dept: CARDIOLOGY | Facility: HOSPITAL | Age: 74
Discharge: HOME | End: 2019-06-11
Attending: INTERNAL MEDICINE
Payer: MEDICARE

## 2019-06-11 ENCOUNTER — OFFICE VISIT (OUTPATIENT)
Dept: CARDIOLOGY | Facility: CLINIC | Age: 74
End: 2019-06-11
Payer: MEDICARE

## 2019-06-11 VITALS
BODY MASS INDEX: 23.39 KG/M2 | WEIGHT: 149 LBS | DIASTOLIC BLOOD PRESSURE: 78 MMHG | SYSTOLIC BLOOD PRESSURE: 137 MMHG | HEIGHT: 67 IN

## 2019-06-11 VITALS
RESPIRATION RATE: 15 BRPM | BODY MASS INDEX: 22.44 KG/M2 | SYSTOLIC BLOOD PRESSURE: 140 MMHG | DIASTOLIC BLOOD PRESSURE: 80 MMHG | WEIGHT: 143 LBS | HEIGHT: 67 IN | HEART RATE: 72 BPM

## 2019-06-11 DIAGNOSIS — R03.0 ELEVATED BLOOD PRESSURE READING WITHOUT DIAGNOSIS OF HYPERTENSION: ICD-10-CM

## 2019-06-11 DIAGNOSIS — Z95.5 STATUS POST INSERTION OF DRUG ELUTING CORONARY ARTERY STENT: ICD-10-CM

## 2019-06-11 DIAGNOSIS — I25.5 ISCHEMIC CARDIOMYOPATHY: Primary | ICD-10-CM

## 2019-06-11 DIAGNOSIS — I73.00 RAYNAUD'S DISEASE WITHOUT GANGRENE: ICD-10-CM

## 2019-06-11 DIAGNOSIS — I21.4 NSTEMI (NON-ST ELEVATED MYOCARDIAL INFARCTION) (CMS/HCC): ICD-10-CM

## 2019-06-11 DIAGNOSIS — I25.5 ISCHEMIC CARDIOMYOPATHY: ICD-10-CM

## 2019-06-11 DIAGNOSIS — I71.40 ABDOMINAL AORTIC ANEURYSM (AAA) WITHOUT RUPTURE (CMS/HCC): ICD-10-CM

## 2019-06-11 DIAGNOSIS — E78.00 HYPERCHOLESTEROLEMIA: ICD-10-CM

## 2019-06-11 LAB
AORTIC ROOT ANNULUS - M-MODE: 2.88 CM
AORTIC VALVE AREA: 1.08 SQUARE CENTIMETERS PER SQUARE METER
AV PEAK GRADIENT: 5.22 MMHG
AV PEAK VELOCITY-S: 1.14 METERS PER SECOND
BSA FOR ECHO PROCEDURE: 1.79 M2
CUSP SEPARATION: 1.8 CM
DOP CALC LVOT STROKE VOLUME: 54.6 ML
DOP CALC RVOT VTI: 17.3 CM
E WAVE DECELERATION TIME: 298.36 MS
E/A RATIO: 0.65
E/E' RATIO: 7.31
E/LAT E' RATIO: 5.57
EDV (BP): 43.12 MILLILITER
EF (A4C): 60.18 PERCENT
EF A2C: 73.5 PERCENT
EJECTION FRACTION: 66 PERCENT
ESV (BP): 14.66 MILLILITER
INTERVENTRICULAR SEPTUM: 1 CM
LA ESV (BP): 29.55 MILLILITER
LAAS-AP2: 12.3 SQUARE CENTIMETER
LAAS-AP4: 13.39 SQUARE CENTIMETER
LAD 2D: 3.64 CM
LAV-S: 27.6 MILLILITER
LEFT ATRIUM VOLUME: 29.5 MILLILITER
LEFT INTERNAL DIMENSION IN SYSTOLE: 2.72 CM (ref 2.44–3.69)
LEFT VENTRICLE DIASTOLIC VOLUME: 44.2 MILLILITER
LEFT VENTRICLE SYSTOLIC VOLUME: 17.6 MILLILITER
LEFT VENTRICULAR INTERNAL DIMENSION IN DIASTOLE: 4.55 CM (ref 4.12–5.71)
LEFT VENTRICULAR MASS: 155.1 GRAM
LEFT VENTRICULAR POSTERIOR WALL IN END DIASTOLE: 0.99 CM (ref 0.54–1)
LV DIASTOLIC VOLUME: 43.4 MILLILITER
LV ESV (APICAL 2 CHAMBER): 11.5 MILLILITER
LVCI: 0.92 LITERS PER MINUTE PER SQUARE METER
LVCO: 1.65 LITERS PER MINUTE
LVOT 2D: 1.96 CM
LVOT A: 3 SQUARE CENTIMETER
LVOT MG: 1.22 MMHG
LVOT MV: 0.52 METERS PER SECOND
LVOT PEAK VELOCITY: 0.73 METERS PER SECOND
LVOT PG: 2.15 MMHG
LVOT VTI: 18.11 CM
MV E'TISSUE VEL-LAT: 0.07 METERS PER SECOND
MV E'TISSUE VEL-MED: 0.05 METERS PER SECOND
MV PEAK A VEL: 0.59 METERS PER SECOND
MV PEAK E VEL: 0.38 METERS PER SECOND
MV VALVE AREA P 1/2 METHOD: 2.54 SQUARE CENTIMETER
POSTERIOR WALL: 0.99 CM
RVOT VMAX: 71.52 M/S
RVOT VTI: 17.3 CM
SEPTAL TISSUE DOPPLER FREE WALL LATE DIA VELOCITY (APICAL 4 CHAMBER VIEW): 8.88 M/S
Z-SCORE OF LEFT VENTRICULAR DIMENSION IN END DIASTOLE: -0.64
Z-SCORE OF LEFT VENTRICULAR DIMENSION IN END SYSTOLE: -0.78
Z-SCORE OF LEFT VENTRICULAR POSTERIOR WALL IN END DIASTOLE: 1.55

## 2019-06-11 PROCEDURE — 93000 ELECTROCARDIOGRAM COMPLETE: CPT | Performed by: INTERNAL MEDICINE

## 2019-06-11 PROCEDURE — 93306 TTE W/DOPPLER COMPLETE: CPT | Mod: 26 | Performed by: INTERNAL MEDICINE

## 2019-06-11 PROCEDURE — 93306 TTE W/DOPPLER COMPLETE: CPT

## 2019-06-11 PROCEDURE — 99214 OFFICE O/P EST MOD 30 MIN: CPT | Performed by: INTERNAL MEDICINE

## 2019-06-11 ASSESSMENT — ENCOUNTER SYMPTOMS
BRUISES/BLEEDS EASILY: 1
ANAL BLEEDING: 0
WHEEZING: 0
SHORTNESS OF BREATH: 1
TROUBLE SWALLOWING: 0
FATIGUE: 1
SPEECH DIFFICULTY: 0
SLEEP DISTURBANCE: 1
HEMATURIA: 0
APNEA: 0
ABDOMINAL DISTENTION: 0
LIGHT-HEADEDNESS: 0
FEVER: 0
FACIAL ASYMMETRY: 0
BLOOD IN STOOL: 0
ARTHRALGIAS: 1
COUGH: 1
CHILLS: 0
ABDOMINAL PAIN: 0
DIZZINESS: 0
WEAKNESS: 0
PALPITATIONS: 0

## 2019-06-11 NOTE — PROGRESS NOTES
"Subjective      Patient ID: Jennifer Sams is a 74 y.o. female.  1945      HPI    The following have been reviewed and updated as appropriate in this visit:  Tobacco  Allergies  Meds  Med Hx  Surg Hx  Fam Hx  Soc Hx      Review of Systems   Constitutional: Positive for fatigue (with lack of sleep). Negative for chills and fever.        Weight decreased 6 lbs since her last visit   HENT: Negative for nosebleeds and trouble swallowing.    Respiratory: Positive for cough and shortness of breath (improved significantly with changing Brilinta to Effient). Negative for apnea and wheezing.         No snoring   Cardiovascular: Negative for chest pain, palpitations and leg swelling.        No orthopnea or PND   Gastrointestinal: Negative for abdominal distention, abdominal pain, anal bleeding and blood in stool.   Genitourinary: Negative for hematuria.   Musculoskeletal: Positive for arthralgias (right hip pain - keeps her awake at night).   Neurological: Negative for dizziness, syncope, facial asymmetry, speech difficulty, weakness and light-headedness.   Hematological: Bruises/bleeds easily.   Psychiatric/Behavioral: Positive for sleep disturbance (with cat ill).       Objective     Vitals:    06/11/19 1447   Resp: 15   Weight: 64.9 kg (143 lb)   Height: 1.702 m (5' 7\")     Body mass index is 22.4 kg/m².    Physical Exam    Assessment/Plan   Diagnoses and all orders for this visit:    Ischemic cardiomyopathy (Primary)  -     ECG 12 lead        "

## 2019-06-11 NOTE — LETTER
Tomasz Courtney MD, [facility]    Director, Clinical Cardiology-[facility] and  Main [facility]    The Neno Canela III Chair in Innovative Cardiology    Clinical Associate Professor of Medicine  The [facility] of  [facility] HEART [facility]  [facility]  Der[address] Level  [address]  [place] [address]    TEL  [phone]  FAX: [phone]  EMAIL: [email or web address]   June 11, 2019    Anitra Way MD  [address] [place] [address]    Noé Moncada MD  Inteventional Cardiology  [facility]      Jennifer J Latrell  1945      Dear Colleagues,      I saw Jennifer J Sams in my office today on June 11, 2019.    She is a 74 y.o. female with a history of exertional angina, non-STEMI 4/26/19, three-vessel CAD; status post FARNK x 2 LAD 4/27/19 + FRANK x 4 RCA 4/29/19 + FRANK x 1 LCX OM2 5/2/19, hypercholesterolemia with a high HDL cholesterol, and osteoarthritis of her hips who presents today for her 1 month follow-up visit.      She continues to experience a cough I would like to begin pseudoephedrine-containing preparation for her allergies.  I have told her to not do so in light of her coronary disease.     She denies chest pain or chest pressure or shortness of breath.    She is quite upset today dealing with the likely fatal illness of her newly adopted kitten.     Transthoracic echocardiography performed today on 6/11/19, which I personally reviewed, revealed:  Technically very difficult study.  Mild concentric left ventricular hypertrophy.  Normal cavity size.  Ejection fraction 55 to 60%.  Impaired left ventricular relaxation.  Mitral valve sclerosis.  Normal left atrium.  Aortic valve and root sclerosis with trace aortic insufficiency.  Normal right heart.  Thickened pericardium.  Compared to the study 4/27/2019, systolic function has normalized.    Her GI symptoms  "have resolved.    Her shortness of breath has resolved with replacement of Brilinta with Effient.    She continues to experience easy bruisability on her dual antiplatelet therapy but has had no melena, hematuria, or rectal bleeding.    She has lost 6 pounds since her last visit.    She has been taking her rosuvastatin 20 mg a day regularly without side effects such as myalgias or significant muscle cramping.     ALLERGIES:  Allergies   Allergen Reactions   • Ace Inhibitors Other (see comments)     cough   • Atorvastatin      Nausea and can'trecall   • Brilinta [Ticagrelor]      SOB   • Codeine      Nausea and Vomiting   • Dilaudid [Hydromorphone]      Nausea & vomiting   • Oxycodone      Nausea & vomiting, dizziness   • Morphine Sulfate Nausea Only and GI intolerance        I have reviewed the problem list, allergies, medications and social history, and they are up to date in the electronic record as of the current encounter.     ROS:  The remainder of the review of systems is otherwise negative in detail for significant complaints.    No changes in her medications have been made since the last visit.    PHYSICAL EXAM:  VS: /80   Pulse 72   Resp 15   Ht 1.702 m (5' 7\")   Wt 64.9 kg (143 lb)   BMI 22.40 kg/m²   General: well-developed, well-nourished, appears well, no acute distress  HEENT: sclera anicteric, conjunctiva pink, no xanthelasma, neck supple, no thyromegaly  Chest: clear to auscultation, symmetrical expansion, no scoliosis   Heart: Apical impulse normally situated, regular rhythm, normal S1, normal S2, no gallops, no murmur  JVP: normal jugular venous pressure, negative AJR  Vascular: carotid pulses: intact bilaterally, no bruit, peripheral pulses: intact bilaterally  Abd: soft, non-tender, no hepatosplenomegaly, abdominal aorta not palpable  Ext: Ecchymosis left forearm, radial artery access site normal, normal radial pulse, small ecchymosis left femoral artery access site, no edema, no " clubbing, no cyanosis  Skin: warm, dry, no ecchymoses  Neuro: alert, oriented x 3, normal muscle strength  Psychiatric: normal affect, normal judgment, normal insight and normal memory      LABS:     Ref. Range 5/23/2019 09:53   Sodium Latest Ref Range: 136 - 144 mEQ/L 142   Potassium Latest Ref Range: 3.6 - 5.1 mEQ/L 3.8   Chloride Latest Ref Range: 98 - 109 mEQ/L 109   CO2 Latest Ref Range: 22 - 32 mEQ/L 25   BUN Latest Ref Range: 8 - 20 mg/dL 7 (L)   Creatinine Latest Ref Range: 0.6 - 1.1 mg/dL 0.8   Glucose Latest Ref Range: 70 - 99 mg/dL 102 (H)   Calcium Latest Ref Range: 8.9 - 10.3 mg/dL 9.3   AST (SGOT) Latest Ref Range: 15 - 41 IU/L 23   ALT (SGPT) Latest Ref Range: 11 - 54 IU/L 16   Alkaline Phosphatase Latest Ref Range: 35 - 126 IU/L 71   Total Protein Latest Ref Range: 6.0 - 8.2 g/dL 6.3   Albumin Latest Ref Range: 3.4 - 5.0 g/dL 3.8   Bilirubin, Total Latest Ref Range: 0.3 - 1.2 mg/dL 0.8   eGFR Latest Ref Range: >=60.0 mL/min/1.73m*2 >60.0   Anion Gap Latest Ref Range: 3 - 15 mEQ/L 8   Cholesterol Latest Ref Range: <=200 mg/dL 142   Triglycerides Latest Ref Range: 30 - 149 mg/dL 105   HDL Latest Ref Range: >=55 mg/dL 57   LDL Calculated Latest Ref Range: <=100 mg/dL 64   Non-HDL, Calculated Latest Units: mg/dL 85   RISK Latest Ref Range: <=5.0  2.5   BNP Latest Ref Range: <=100 pg/mL 86       ECG:  The electrocardiogram obtained today 6/11/19 revealed Sinus  Rhythm at a rate of 72. PRWP V1-V2. Diffuse ST-T wave abnormalities improved in comparison to the previous ECG of 5/9/19.     IMPRESSION & PLAN:  Non-STEMI 4/26/19; three-vessel CAD; status post FRANK x2 LAD 4/27/19; DESx3 RCA 4/29/19; FRANK x1 LCX OM2:  She reports no recurrence of her anginal symptoms.  Exertional dyspnea has improved with the discontinuation of Brilinta and its replacement with Effient. She will continue low-dose aspirin.    History of ischemic cardiomyopathy; resolved post revascularization (LVEF =50 to 55% 6/19):  Improvement in  her LVEF suggests significant myocardial hibernation has resolved with revascularization.     Hypercholesterolemia:  She continues to tolerate her lipid lowering medications without significant side effects or hepatotoxicity. Her lipids are at goal. Her liver function studies and CPK are normal.  I will continue her present lipid lowering regimen and reassess her lab studies in 6 months.       No changes were made in her regimen today.  Listed below you will find the regimen that she will be requested to continue:      Current Outpatient Prescriptions:   •  aspirin (ASPIR-81) 81 mg enteric coated tablet, Take 1 tablet (81 mg total) by mouth daily., Disp: 90 tablet, Rfl: 5  •  dicyclomine (BENTYL) 10 mg capsule, Take 10 mg by mouth 4 (four) times a day as needed.  , Disp: 360 capsule, Rfl: 1  •  nitroglycerin (NITROSTAT) 0.4 mg SL tablet, Place 1 tablet (0.4 mg total) under the tongue every 5 (five) minutes as needed for chest pain., Disp: 25 tablet, Rfl: 4  •  pantoprazole (PROTONIX) 40 mg EC tablet, Take 40 mg by mouth daily., Disp: , Rfl:   •  prasugrel (EFFIENT) 10 mg tablet, Take 1 tablet (10 mg total) by mouth daily., Disp: 90 tablet, Rfl: 4  •  rosuvastatin (CRESTOR) 20 mg tablet, Take 1 tablet (20 mg total) by mouth daily., Disp: 90 tablet, Rfl: 4    If there are no new interval cardiovascular problems, I will see her again in 3 months.    I sincerely appreciate the opportunity to participate in the care of your patients. Please do not hesitate to contact me if any questions or problems arise.     With best wishes and warmest personal regards.      Sincerely,    Tomasz Courtney MD, [facility]    This document was generated utilizing voice recognition technology. A reasonable attempt at proofreading has been made to minimize errors but please excuse any typographical errors which may be present. Please call with any questions.

## 2019-06-11 NOTE — PROGRESS NOTES
Tomasz Courtney MD, [facility]    Director, Clinical Cardiology-[facility] and  Main [facility]    The Neno Canela III Chair in Innovative Cardiology    Clinical Associate Professor of Medicine  The [facility] of  [facility] HEART [facility]  [facility]  Der[address] Level  [address]  [place] [address]    TEL  [phone]  FAX: [phone]  EMAIL: [email or web address]   June 11, 2019    Anitra Way MD  [address] [place] [address]    Noé Moncada MD  Inteventional Cardiology  [facility]      Jennifer J Latrell  1945      Dear Colleagues,      I saw Jennifer J Sams in my office today on June 11, 2019.    She is a 74 y.o. female with a history of exertional angina, non-STEMI 4/26/19, three-vessel CAD; status post FRANK x 2 LAD 4/27/19 + FRANK x 4 RCA 4/29/19 + FRANK x 1 LCX OM2 5/2/19, hypercholesterolemia with a high HDL cholesterol, and osteoarthritis of her hips who presents today for her 1 month follow-up visit.      She continues to experience a cough I would like to begin pseudoephedrine-containing preparation for her allergies.  I have told her to not do so in light of her coronary disease.     She denies chest pain or chest pressure or shortness of breath.    She is quite upset today dealing with the likely fatal illness of her newly adopted kitten.     Transthoracic echocardiography performed today on 6/11/19, which I personally reviewed, revealed:  Technically very difficult study.  Mild concentric left ventricular hypertrophy.  Normal cavity size.  Ejection fraction 55 to 60%.  Impaired left ventricular relaxation.  Mitral valve sclerosis.  Normal left atrium.  Aortic valve and root sclerosis with trace aortic insufficiency.  Normal right heart.  Thickened pericardium.  Compared to the study 4/27/2019, systolic function has normalized.    Her GI symptoms  "have resolved.    Her shortness of breath has resolved with replacement of Brilinta with Effient.    She continues to experience easy bruisability on her dual antiplatelet therapy but has had no melena, hematuria, or rectal bleeding.    She has lost 6 pounds since her last visit.    She has been taking her rosuvastatin 20 mg a day regularly without side effects such as myalgias or significant muscle cramping.     ALLERGIES:  Allergies   Allergen Reactions   • Ace Inhibitors Other (see comments)     cough   • Atorvastatin      Nausea and can'trecall   • Brilinta [Ticagrelor]      SOB   • Codeine      Nausea and Vomiting   • Dilaudid [Hydromorphone]      Nausea & vomiting   • Oxycodone      Nausea & vomiting, dizziness   • Morphine Sulfate Nausea Only and GI intolerance        I have reviewed the problem list, allergies, medications and social history, and they are up to date in the electronic record as of the current encounter.     ROS:  The remainder of the review of systems is otherwise negative in detail for significant complaints.    No changes in her medications have been made since the last visit.    PHYSICAL EXAM:  VS: /80   Pulse 72   Resp 15   Ht 1.702 m (5' 7\")   Wt 64.9 kg (143 lb)   BMI 22.40 kg/m²   General: well-developed, well-nourished, appears well, no acute distress  HEENT: sclera anicteric, conjunctiva pink, no xanthelasma, neck supple, no thyromegaly  Chest: clear to auscultation, symmetrical expansion, no scoliosis   Heart: Apical impulse normally situated, regular rhythm, normal S1, normal S2, no gallops, no murmur  JVP: normal jugular venous pressure, negative AJR  Vascular: carotid pulses: intact bilaterally, no bruit, peripheral pulses: intact bilaterally  Abd: soft, non-tender, no hepatosplenomegaly, abdominal aorta not palpable  Ext: Ecchymosis left forearm, radial artery access site normal, normal radial pulse, small ecchymosis left femoral artery access site, no edema, no " clubbing, no cyanosis  Skin: warm, dry, no ecchymoses  Neuro: alert, oriented x 3, normal muscle strength  Psychiatric: normal affect, normal judgment, normal insight and normal memory      LABS:     Ref. Range 5/23/2019 09:53   Sodium Latest Ref Range: 136 - 144 mEQ/L 142   Potassium Latest Ref Range: 3.6 - 5.1 mEQ/L 3.8   Chloride Latest Ref Range: 98 - 109 mEQ/L 109   CO2 Latest Ref Range: 22 - 32 mEQ/L 25   BUN Latest Ref Range: 8 - 20 mg/dL 7 (L)   Creatinine Latest Ref Range: 0.6 - 1.1 mg/dL 0.8   Glucose Latest Ref Range: 70 - 99 mg/dL 102 (H)   Calcium Latest Ref Range: 8.9 - 10.3 mg/dL 9.3   AST (SGOT) Latest Ref Range: 15 - 41 IU/L 23   ALT (SGPT) Latest Ref Range: 11 - 54 IU/L 16   Alkaline Phosphatase Latest Ref Range: 35 - 126 IU/L 71   Total Protein Latest Ref Range: 6.0 - 8.2 g/dL 6.3   Albumin Latest Ref Range: 3.4 - 5.0 g/dL 3.8   Bilirubin, Total Latest Ref Range: 0.3 - 1.2 mg/dL 0.8   eGFR Latest Ref Range: >=60.0 mL/min/1.73m*2 >60.0   Anion Gap Latest Ref Range: 3 - 15 mEQ/L 8   Cholesterol Latest Ref Range: <=200 mg/dL 142   Triglycerides Latest Ref Range: 30 - 149 mg/dL 105   HDL Latest Ref Range: >=55 mg/dL 57   LDL Calculated Latest Ref Range: <=100 mg/dL 64   Non-HDL, Calculated Latest Units: mg/dL 85   RISK Latest Ref Range: <=5.0  2.5   BNP Latest Ref Range: <=100 pg/mL 86       ECG:  The electrocardiogram obtained today 6/11/19 revealed Sinus  Rhythm at a rate of 72. PRWP V1-V2. Diffuse ST-T wave abnormalities improved in comparison to the previous ECG of 5/9/19.     IMPRESSION & PLAN:  Non-STEMI 4/26/19; three-vessel CAD; status post FRANK x2 LAD 4/27/19; DESx3 RCA 4/29/19; FRANK x1 LCX OM2:  She reports no recurrence of her anginal symptoms.  Exertional dyspnea has improved with the discontinuation of Brilinta and its replacement with Effient. She will continue low-dose aspirin.    History of ischemic cardiomyopathy; resolved post revascularization (LVEF =50 to 55% 6/19):  Improvement in  her LVEF suggests significant myocardial hibernation has resolved with revascularization.     Hypercholesterolemia:  She continues to tolerate her lipid lowering medications without significant side effects or hepatotoxicity. Her lipids are at goal. Her liver function studies and CPK are normal.  I will continue her present lipid lowering regimen and reassess her lab studies in 6 months.       No changes were made in her regimen today.  Listed below you will find the regimen that she will be requested to continue:      Current Outpatient Prescriptions:   •  aspirin (ASPIR-81) 81 mg enteric coated tablet, Take 1 tablet (81 mg total) by mouth daily., Disp: 90 tablet, Rfl: 5  •  dicyclomine (BENTYL) 10 mg capsule, Take 10 mg by mouth 4 (four) times a day as needed.  , Disp: 360 capsule, Rfl: 1  •  nitroglycerin (NITROSTAT) 0.4 mg SL tablet, Place 1 tablet (0.4 mg total) under the tongue every 5 (five) minutes as needed for chest pain., Disp: 25 tablet, Rfl: 4  •  pantoprazole (PROTONIX) 40 mg EC tablet, Take 40 mg by mouth daily., Disp: , Rfl:   •  prasugrel (EFFIENT) 10 mg tablet, Take 1 tablet (10 mg total) by mouth daily., Disp: 90 tablet, Rfl: 4  •  rosuvastatin (CRESTOR) 20 mg tablet, Take 1 tablet (20 mg total) by mouth daily., Disp: 90 tablet, Rfl: 4    If there are no new interval cardiovascular problems, I will see her again in 3 months.    I sincerely appreciate the opportunity to participate in the care of your patients. Please do not hesitate to contact me if any questions or problems arise.     With best wishes and warmest personal regards.      Sincerely,    Tomasz Courtney MD, [facility]    This document was generated utilizing voice recognition technology. A reasonable attempt at proofreading has been made to minimize errors but please excuse any typographical errors which may be present. Please call with any questions.

## 2019-09-10 ENCOUNTER — OFFICE VISIT (OUTPATIENT)
Dept: CARDIOLOGY | Facility: CLINIC | Age: 74
End: 2019-09-10
Payer: MEDICARE

## 2019-09-10 VITALS
BODY MASS INDEX: 22.6 KG/M2 | HEART RATE: 67 BPM | HEIGHT: 67 IN | DIASTOLIC BLOOD PRESSURE: 70 MMHG | RESPIRATION RATE: 15 BRPM | WEIGHT: 144 LBS | SYSTOLIC BLOOD PRESSURE: 138 MMHG

## 2019-09-10 DIAGNOSIS — R03.0 ELEVATED BLOOD PRESSURE READING WITHOUT DIAGNOSIS OF HYPERTENSION: ICD-10-CM

## 2019-09-10 DIAGNOSIS — I71.40 ABDOMINAL AORTIC ANEURYSM (AAA) WITHOUT RUPTURE (CMS/HCC): ICD-10-CM

## 2019-09-10 DIAGNOSIS — I21.4 NSTEMI (NON-ST ELEVATED MYOCARDIAL INFARCTION) (CMS/HCC): ICD-10-CM

## 2019-09-10 DIAGNOSIS — E78.00 HYPERCHOLESTEROLEMIA: ICD-10-CM

## 2019-09-10 DIAGNOSIS — Z95.5 STATUS POST INSERTION OF DRUG ELUTING CORONARY ARTERY STENT: ICD-10-CM

## 2019-09-10 DIAGNOSIS — I25.5 ISCHEMIC CARDIOMYOPATHY: Primary | ICD-10-CM

## 2019-09-10 DIAGNOSIS — I73.00 RAYNAUD'S DISEASE WITHOUT GANGRENE: ICD-10-CM

## 2019-09-10 PROCEDURE — 99214 OFFICE O/P EST MOD 30 MIN: CPT | Performed by: INTERNAL MEDICINE

## 2019-09-10 PROCEDURE — 93000 ELECTROCARDIOGRAM COMPLETE: CPT | Performed by: INTERNAL MEDICINE

## 2019-09-10 NOTE — LETTER
Tomasz Courtney MD, [facility]    Director, Clinical Cardiology-[facility] and  Main [facility]    The Neno Canela III Chair in Innovative Cardiology    Clinical Associate Professor of Medicine  The [facility] of  [facility] HEART [facility]  [facility]  [address]  [address]  [place] [address]    TEL  [phone]  FAX: [phone]  EMAIL: [email or web address]   September 10, 2019    Anitra Way MD  [address] [place] [address]    Noé Moncada MD  Inteventional Cardiology  [facility]      Jennifer Sams  1945      Dear Colleagues,      I saw Jennifer Sams in my office today on September 10, 2019.    She is a 74 y.o. female with a history of exertional angina, non-STEMI 4/26/19, three-vessel CAD; status post FRANK x 2 LAD 4/27/19 + FRANK x 4 RCA 4/29/19 + FRANK x 1 LCX OM2 5/2/19, hypercholesterolemia with a high HDL cholesterol, and osteoarthritis of her hips who presents today for her 1 month follow-up visit.    She reports no recurrence of the chest discomfort which she had experienced when she presented with her acute coronary syndrome.    She developed sciatica and is been limited by pain in her right buttocks.  Apparently a pain specialist is willing to inject her buttocks while on dual antiplatelet therapy.  I told her that she could not discontinue prasagrel for at least 6 months after her most recent coronary intervention.    She still is troubled by episodic mild epistaxis which she attributes to her allergies.  She understands need to avoid pseudoephedrine containing medications.    She continues to experience easy bruisability on her dual antiplatelet therapy but has had no melena, hematuria, or rectal bleeding.    She has gained 1 pound since her last visit.    She has been taking her rosuvastatin 20 mg a day regularly without side  "effects such as myalgias or significant muscle cramping.     ALLERGIES:  Allergies   Allergen Reactions   • Ace Inhibitors Other (see comments)     cough   • Atorvastatin      Nausea and can'trecall   • Brilinta [Ticagrelor]      SOB   • Codeine      Nausea and Vomiting   • Dilaudid [Hydromorphone]      Nausea & vomiting   • Oxycodone      Nausea & vomiting, dizziness   • Morphine Sulfate Nausea Only and GI intolerance        I have reviewed the problem list, allergies, medications and social history, and they are up to date in the electronic record as of the current encounter.     ROS:  The remainder of the review of systems is otherwise negative in detail for significant complaints.    No changes in her medications have been made since the last visit.    PHYSICAL EXAM:  VS: /70   Pulse 67   Resp 15   Ht 1.702 m (5' 7\")   Wt 65.3 kg (144 lb)   BMI 22.55 kg/m²   General: well-developed, well-nourished, appears well, no acute distress  HEENT: sclera anicteric, conjunctiva pink, no xanthelasma, neck supple, no thyromegaly  Chest: clear to auscultation, symmetrical expansion, no scoliosis   Heart: Apical impulse normally situated, regular rhythm, normal S1, normal S2, no gallops, no murmur  JVP: normal jugular venous pressure, negative AJR  Vascular: carotid pulses: intact bilaterally, no bruit, peripheral pulses: intact bilaterally  Abd: soft, non-tender, no hepatosplenomegaly, abdominal aorta not palpable  Ext: Small ecchymosis right shin, no edema, no clubbing, no cyanosis  Skin: warm, dry, no ecchymoses  Neuro: alert, oriented x 3, normal muscle strength  Psychiatric: normal affect, normal judgment, normal insight and normal memory      LABS:     Ref. Range 5/23/2019 09:53   Sodium Latest Ref Range: 136 - 144 mEQ/L 142   Potassium Latest Ref Range: 3.6 - 5.1 mEQ/L 3.8   Chloride Latest Ref Range: 98 - 109 mEQ/L 109   CO2 Latest Ref Range: 22 - 32 mEQ/L 25   BUN Latest Ref Range: 8 - 20 mg/dL 7 (L) "   Creatinine Latest Ref Range: 0.6 - 1.1 mg/dL 0.8   Glucose Latest Ref Range: 70 - 99 mg/dL 102 (H)   Calcium Latest Ref Range: 8.9 - 10.3 mg/dL 9.3   AST (SGOT) Latest Ref Range: 15 - 41 IU/L 23   ALT (SGPT) Latest Ref Range: 11 - 54 IU/L 16   Alkaline Phosphatase Latest Ref Range: 35 - 126 IU/L 71   Total Protein Latest Ref Range: 6.0 - 8.2 g/dL 6.3   Albumin Latest Ref Range: 3.4 - 5.0 g/dL 3.8   Bilirubin, Total Latest Ref Range: 0.3 - 1.2 mg/dL 0.8   eGFR Latest Ref Range: >=60.0 mL/min/1.73m*2 >60.0   Anion Gap Latest Ref Range: 3 - 15 mEQ/L 8   Cholesterol Latest Ref Range: <=200 mg/dL 142   Triglycerides Latest Ref Range: 30 - 149 mg/dL 105   HDL Latest Ref Range: >=55 mg/dL 57   LDL Calculated Latest Ref Range: <=100 mg/dL 64   Non-HDL, Calculated Latest Units: mg/dL 85   RISK Latest Ref Range: <=5.0  2.5   BNP Latest Ref Range: <=100 pg/mL 86       ECG:  The electrocardiogram obtained today 9/10/19 revealed Sinus  Rhythm at a rate of 67. Within normal limits. When compared with the most recent ECG of 6/11/19 there was no significant change.    IMPRESSION & PLAN:  Non-STEMI 4/26/19; three-vessel CAD; status post FRANK x2 LAD 4/27/19; DESx3 RCA 4/29/19; FRANK x1 LCX OM2:  She reports no recurrence of her anginal symptoms.  Tolerating dual antiplatelet therapy with anticipated easy bruisability.  As long as she continues dual antiplatelet therapy she will be permitted to undergo a steroid injection of her buttocks    History of ischemic cardiomyopathy; resolved post revascularization (LVEF =50 to 55% 6/19):  Improvement in her LVEF confirmed that significant myocardial hibernation has resolved with revascularization.     Hypercholesterolemia:  She continues to tolerate her lipid lowering medications without significant side effects.  Follow-up labs to be obtained prior to her return visit.    No changes were made in her regimen today.  Listed below you will find the regimen that she will be requested to  continue:      Current Outpatient Prescriptions:   •  aspirin (ASPIR-81) 81 mg enteric coated tablet, Take 1 tablet (81 mg total) by mouth daily., Disp: 90 tablet, Rfl: 5  •  nitroglycerin (NITROSTAT) 0.4 mg SL tablet, Place 1 tablet (0.4 mg total) under the tongue every 5 (five) minutes as needed for chest pain., Disp: 25 tablet, Rfl: 4  •  pantoprazole (PROTONIX) 40 mg EC tablet, Take 40 mg by mouth daily., Disp: , Rfl:   •  prasugrel (EFFIENT) 10 mg tablet, Take 1 tablet (10 mg total) by mouth daily., Disp: 90 tablet, Rfl: 4  •  dicyclomine (BENTYL) 10 mg capsule, Take 10 mg by mouth 4 (four) times a day as needed.  , Disp: 360 capsule, Rfl: 1  •  rosuvastatin (CRESTOR) 20 mg tablet, Take 1 tablet (20 mg total) by mouth daily., Disp: 90 tablet, Rfl: 4    If there are no new interval cardiovascular problems, I will see her again in 4 months.    I sincerely appreciate the opportunity to participate in the care of your patients. Please do not hesitate to contact me if any questions or problems arise.     With best wishes and warmest personal regards.      Sincerely,    Tomasz Courtney MD, [facility]    This document was generated utilizing voice recognition technology. A reasonable attempt at proofreading has been made to minimize errors but please excuse any typographical errors which may be present. Please call with any questions.

## 2019-09-10 NOTE — PROGRESS NOTES
Tomasz Courtney MD, [facility]    Director, Clinical Cardiology-[facility] and  Main [facility]    The N[employer] Chair in Innovative Cardiology    Clinical Associate Professor of Medicine  The General acute hospital of  [facility] HEART [facility]  [facility]  [facility]  [address]  [place] [address]    TEL  [phone]  FAX: [phone]  EMAIL: [email or web address]   September 10, 2019    Anitra Way MD  [address] [place] [address]    Noé Moncada MD  Inteventional Cardiology  [facility]      Jennifer Sams  1945      Dear Colleagues,      I saw Jennifer Sams in my office today on September 10, 2019.    She is a 74 y.o. female with a history of exertional angina, non-STEMI 4/26/19, three-vessel CAD; status post FRANK x 2 LAD 4/27/19 + FRANK x 4 RCA 4/29/19 + FRANK x 1 LCX OM2 5/2/19, hypercholesterolemia with a high HDL cholesterol, and osteoarthritis of her hips who presents today for her 1 month follow-up visit.    She reports no recurrence of the chest discomfort which she had experienced when she presented with her acute coronary syndrome.    She developed sciatica and is been limited by pain in her right buttocks.  Apparently a pain specialist is willing to inject her buttocks while on dual antiplatelet therapy.  I told her that she could not discontinue prasagrel for at least 6 months after her most recent coronary intervention.    She still is troubled by episodic mild epistaxis which she attributes to her allergies.  She understands need to avoid pseudoephedrine containing medications.    She continues to experience easy bruisability on her dual antiplatelet therapy but has had no melena, hematuria, or rectal bleeding.    She has gained 1 pound since her last visit.    She has been taking her rosuvastatin 20 mg a day regularly without side  "effects such as myalgias or significant muscle cramping.     ALLERGIES:  Allergies   Allergen Reactions   • Ace Inhibitors Other (see comments)     cough   • Atorvastatin      Nausea and can'trecall   • Brilinta [Ticagrelor]      SOB   • Codeine      Nausea and Vomiting   • Dilaudid [Hydromorphone]      Nausea & vomiting   • Oxycodone      Nausea & vomiting, dizziness   • Morphine Sulfate Nausea Only and GI intolerance        I have reviewed the problem list, allergies, medications and social history, and they are up to date in the electronic record as of the current encounter.     ROS:  The remainder of the review of systems is otherwise negative in detail for significant complaints.    No changes in her medications have been made since the last visit.    PHYSICAL EXAM:  VS: /70   Pulse 67   Resp 15   Ht 1.702 m (5' 7\")   Wt 65.3 kg (144 lb)   BMI 22.55 kg/m²   General: well-developed, well-nourished, appears well, no acute distress  HEENT: sclera anicteric, conjunctiva pink, no xanthelasma, neck supple, no thyromegaly  Chest: clear to auscultation, symmetrical expansion, no scoliosis   Heart: Apical impulse normally situated, regular rhythm, normal S1, normal S2, no gallops, no murmur  JVP: normal jugular venous pressure, negative AJR  Vascular: carotid pulses: intact bilaterally, no bruit, peripheral pulses: intact bilaterally  Abd: soft, non-tender, no hepatosplenomegaly, abdominal aorta not palpable  Ext: Small ecchymosis right shin, no edema, no clubbing, no cyanosis  Skin: warm, dry, no ecchymoses  Neuro: alert, oriented x 3, normal muscle strength  Psychiatric: normal affect, normal judgment, normal insight and normal memory      LABS:     Ref. Range 5/23/2019 09:53   Sodium Latest Ref Range: 136 - 144 mEQ/L 142   Potassium Latest Ref Range: 3.6 - 5.1 mEQ/L 3.8   Chloride Latest Ref Range: 98 - 109 mEQ/L 109   CO2 Latest Ref Range: 22 - 32 mEQ/L 25   BUN Latest Ref Range: 8 - 20 mg/dL 7 (L) "   Creatinine Latest Ref Range: 0.6 - 1.1 mg/dL 0.8   Glucose Latest Ref Range: 70 - 99 mg/dL 102 (H)   Calcium Latest Ref Range: 8.9 - 10.3 mg/dL 9.3   AST (SGOT) Latest Ref Range: 15 - 41 IU/L 23   ALT (SGPT) Latest Ref Range: 11 - 54 IU/L 16   Alkaline Phosphatase Latest Ref Range: 35 - 126 IU/L 71   Total Protein Latest Ref Range: 6.0 - 8.2 g/dL 6.3   Albumin Latest Ref Range: 3.4 - 5.0 g/dL 3.8   Bilirubin, Total Latest Ref Range: 0.3 - 1.2 mg/dL 0.8   eGFR Latest Ref Range: >=60.0 mL/min/1.73m*2 >60.0   Anion Gap Latest Ref Range: 3 - 15 mEQ/L 8   Cholesterol Latest Ref Range: <=200 mg/dL 142   Triglycerides Latest Ref Range: 30 - 149 mg/dL 105   HDL Latest Ref Range: >=55 mg/dL 57   LDL Calculated Latest Ref Range: <=100 mg/dL 64   Non-HDL, Calculated Latest Units: mg/dL 85   RISK Latest Ref Range: <=5.0  2.5   BNP Latest Ref Range: <=100 pg/mL 86       ECG:  The electrocardiogram obtained today 9/10/19 revealed Sinus  Rhythm at a rate of 67. Within normal limits. When compared with the most recent ECG of 6/11/19 there was no significant change.    IMPRESSION & PLAN:  Non-STEMI 4/26/19; three-vessel CAD; status post FRANK x2 LAD 4/27/19; DESx3 RCA 4/29/19; FRANK x1 LCX OM2:  She reports no recurrence of her anginal symptoms.  Tolerating dual antiplatelet therapy with anticipated easy bruisability.  As long as she continues dual antiplatelet therapy she will be permitted to undergo a steroid injection of her buttocks    History of ischemic cardiomyopathy; resolved post revascularization (LVEF =50 to 55% 6/19):  Improvement in her LVEF confirmed that significant myocardial hibernation has resolved with revascularization.     Hypercholesterolemia:  She continues to tolerate her lipid lowering medications without significant side effects.  Follow-up labs to be obtained prior to her return visit.    No changes were made in her regimen today.  Listed below you will find the regimen that she will be requested to  continue:      Current Outpatient Prescriptions:   •  aspirin (ASPIR-81) 81 mg enteric coated tablet, Take 1 tablet (81 mg total) by mouth daily., Disp: 90 tablet, Rfl: 5  •  nitroglycerin (NITROSTAT) 0.4 mg SL tablet, Place 1 tablet (0.4 mg total) under the tongue every 5 (five) minutes as needed for chest pain., Disp: 25 tablet, Rfl: 4  •  pantoprazole (PROTONIX) 40 mg EC tablet, Take 40 mg by mouth daily., Disp: , Rfl:   •  prasugrel (EFFIENT) 10 mg tablet, Take 1 tablet (10 mg total) by mouth daily., Disp: 90 tablet, Rfl: 4  •  dicyclomine (BENTYL) 10 mg capsule, Take 10 mg by mouth 4 (four) times a day as needed.  , Disp: 360 capsule, Rfl: 1  •  rosuvastatin (CRESTOR) 20 mg tablet, Take 1 tablet (20 mg total) by mouth daily., Disp: 90 tablet, Rfl: 4    If there are no new interval cardiovascular problems, I will see her again in 4 months.    I sincerely appreciate the opportunity to participate in the care of your patients. Please do not hesitate to contact me if any questions or problems arise.     With best wishes and warmest personal regards.      Sincerely,    Tomasz Courtney MD, [facility]    This document was generated utilizing voice recognition technology. A reasonable attempt at proofreading has been made to minimize errors but please excuse any typographical errors which may be present. Please call with any questions.

## 2019-11-12 ENCOUNTER — APPOINTMENT (EMERGENCY)
Dept: RADIOLOGY | Facility: HOSPITAL | Age: 74
DRG: 244 | End: 2019-11-12
Payer: MEDICARE

## 2019-11-12 ENCOUNTER — HOSPITAL ENCOUNTER (INPATIENT)
Facility: HOSPITAL | Age: 74
LOS: 5 days | Discharge: HOME | DRG: 244 | End: 2019-11-19
Attending: EMERGENCY MEDICINE | Admitting: INTERNAL MEDICINE
Payer: MEDICARE

## 2019-11-12 DIAGNOSIS — R07.9 CHEST PAIN, UNSPECIFIED TYPE: ICD-10-CM

## 2019-11-12 DIAGNOSIS — I44.30 AV BLOCK: Primary | ICD-10-CM

## 2019-11-12 DIAGNOSIS — I44.1 MOBITZ TYPE 2 SECOND DEGREE ATRIOVENTRICULAR BLOCK: ICD-10-CM

## 2019-11-12 LAB
ALBUMIN SERPL-MCNC: 3.9 G/DL (ref 3.4–5)
ALP SERPL-CCNC: 68 IU/L (ref 35–126)
ALT SERPL-CCNC: 21 IU/L (ref 11–54)
ANION GAP SERPL CALC-SCNC: 9 MEQ/L (ref 3–15)
AST SERPL-CCNC: 31 IU/L (ref 15–41)
BASOPHILS # BLD: 0.04 K/UL (ref 0.01–0.1)
BASOPHILS NFR BLD: 0.6 %
BILIRUB SERPL-MCNC: 0.7 MG/DL (ref 0.3–1.2)
BNP SERPL-MCNC: 57 PG/ML
BUN SERPL-MCNC: 14 MG/DL (ref 8–20)
CALCIUM SERPL-MCNC: 9.3 MG/DL (ref 8.9–10.3)
CHLORIDE SERPL-SCNC: 107 MEQ/L (ref 98–109)
CO2 SERPL-SCNC: 24 MEQ/L (ref 22–32)
CREAT SERPL-MCNC: 0.9 MG/DL
DIFFERENTIAL METHOD BLD: NORMAL
EOSINOPHIL # BLD: 0.07 K/UL (ref 0.04–0.36)
EOSINOPHIL NFR BLD: 1.1 %
ERYTHROCYTE [DISTWIDTH] IN BLOOD BY AUTOMATED COUNT: 13.6 % (ref 11.7–14.4)
GFR SERPL CREATININE-BSD FRML MDRD: >60 ML/MIN/1.73M*2
GLUCOSE SERPL-MCNC: 107 MG/DL (ref 70–99)
HCT VFR BLDCO AUTO: 42.1 %
HGB BLD-MCNC: 14 G/DL (ref 11.8–15.7)
IMM GRANULOCYTES # BLD AUTO: 0.03 K/UL (ref 0–0.08)
IMM GRANULOCYTES NFR BLD AUTO: 0.5 %
LYMPHOCYTES # BLD: 1.74 K/UL (ref 1.2–3.5)
LYMPHOCYTES NFR BLD: 28.1 %
MCH RBC QN AUTO: 29.9 PG (ref 28–33.2)
MCHC RBC AUTO-ENTMCNC: 33.3 G/DL (ref 32.2–35.5)
MCV RBC AUTO: 89.8 FL (ref 83–98)
MONOCYTES # BLD: 0.56 K/UL (ref 0.28–0.8)
MONOCYTES NFR BLD: 9 %
NEUTROPHILS # BLD: 3.75 K/UL (ref 1.7–7)
NEUTS SEG NFR BLD: 60.7 %
NRBC BLD-RTO: 0 %
PDW BLD AUTO: 9 FL (ref 9.4–12.3)
PLATELET # BLD AUTO: 212 K/UL
POTASSIUM SERPL-SCNC: 3.8 MEQ/L (ref 3.6–5.1)
PROT SERPL-MCNC: 6.6 G/DL (ref 6–8.2)
RBC # BLD AUTO: 4.69 M/UL (ref 3.93–5.22)
SODIUM SERPL-SCNC: 140 MEQ/L (ref 136–144)
TROPONIN I SERPL-MCNC: <0.03 NG/ML
WBC # BLD AUTO: 6.19 K/UL

## 2019-11-12 PROCEDURE — 71046 X-RAY EXAM CHEST 2 VIEWS: CPT

## 2019-11-12 PROCEDURE — 85025 COMPLETE CBC W/AUTO DIFF WBC: CPT | Performed by: EMERGENCY MEDICINE

## 2019-11-12 PROCEDURE — 93005 ELECTROCARDIOGRAM TRACING: CPT | Performed by: EMERGENCY MEDICINE

## 2019-11-12 PROCEDURE — 99285 EMERGENCY DEPT VISIT HI MDM: CPT | Mod: 25

## 2019-11-12 PROCEDURE — 82247 BILIRUBIN TOTAL: CPT | Performed by: EMERGENCY MEDICINE

## 2019-11-12 PROCEDURE — 84484 ASSAY OF TROPONIN QUANT: CPT | Performed by: EMERGENCY MEDICINE

## 2019-11-12 PROCEDURE — 83880 ASSAY OF NATRIURETIC PEPTIDE: CPT | Performed by: EMERGENCY MEDICINE

## 2019-11-12 PROCEDURE — 80061 LIPID PANEL: CPT | Performed by: HOSPITALIST

## 2019-11-12 PROCEDURE — 36415 COLL VENOUS BLD VENIPUNCTURE: CPT | Performed by: EMERGENCY MEDICINE

## 2019-11-12 PROCEDURE — 93005 ELECTROCARDIOGRAM TRACING: CPT

## 2019-11-12 RX ORDER — CHLORPHENIRAMINE MALEATE 4 MG
4 TABLET ORAL EVERY 6 HOURS PRN
COMMUNITY
End: 2019-11-26 | Stop reason: ALTCHOICE

## 2019-11-12 ASSESSMENT — ENCOUNTER SYMPTOMS
BACK PAIN: 0
VOMITING: 0
ALTERED MENTAL STATUS: 0
FATIGUE: 0
PALPITATIONS: 0
NAUSEA: 0
ABDOMINAL PAIN: 0
SYNCOPE: 0
LOWER EXTREMITY EDEMA: 0
COUGH: 1
WEAKNESS: 0
CLAUDICATION: 0
SHORTNESS OF BREATH: 1
DIAPHORESIS: 1
PND: 0
DIZZINESS: 1

## 2019-11-12 NOTE — Clinical Note
The bilateral chest was clipped  and prepped with ChloraPrep. The patient was draped in a sterile fashion after allowing for the recommended dry time.

## 2019-11-12 NOTE — Clinical Note
The Pacemaker [place] Xt Dr Mri device was inserted. The leads were placed into the connector and visually verified to be in correct position.

## 2019-11-13 ENCOUNTER — APPOINTMENT (OUTPATIENT)
Dept: CARDIOLOGY | Facility: HOSPITAL | Age: 74
Setting detail: OBSERVATION
DRG: 244 | End: 2019-11-13
Attending: INTERNAL MEDICINE
Payer: MEDICARE

## 2019-11-13 PROBLEM — I25.119 CORONARY ARTERY DISEASE INVOLVING NATIVE CORONARY ARTERY OF NATIVE HEART WITH ANGINA PECTORIS (CMS/HCC): Status: ACTIVE | Noted: 2019-11-13

## 2019-11-13 LAB
ATRIAL RATE: 58
ATRIAL RATE: 73
CHOLEST SERPL-MCNC: 159 MG/DL
HDLC SERPL-MCNC: 66 MG/DL
HDLC SERPL: 2.4 {RATIO}
LDLC SERPL CALC-MCNC: 79 MG/DL
NONHDLC SERPL-MCNC: 93 MG/DL
P AXIS: 64
P AXIS: 66
PR INTERVAL: 150
PR INTERVAL: 192
QRS DURATION: 80
QRS DURATION: 84
QT INTERVAL: 414
QT INTERVAL: 464
QTC CALCULATION(BAZETT): 455
QTC CALCULATION(BAZETT): 456
R AXIS: 42
R AXIS: 63
STRESS ANGINA INDEX: 0
STRESS BASELINE BP: NORMAL MMHG
STRESS BASELINE HR: 97 BPM
STRESS ECHO POST RECOVERY HR: 75 BPM
STRESS PERCENT HR: 44 %
STRESS POST EXERCISE DUR MIN: 2 MIN
STRESS POST PEAK BP: NORMAL MMHG
STRESS POST PEAK HR: 64 BPM
STRESS TARGET HR: 124 BPM
T WAVE AXIS: 57
T WAVE AXIS: 65
TRIGL SERPL-MCNC: 71 MG/DL (ref 30–149)
TROPONIN I SERPL-MCNC: <0.03 NG/ML
VENTRICULAR RATE: 58
VENTRICULAR RATE: 73

## 2019-11-13 PROCEDURE — 93005 ELECTROCARDIOGRAM TRACING: CPT | Performed by: HOSPITALIST

## 2019-11-13 PROCEDURE — G0378 HOSPITAL OBSERVATION PER HR: HCPCS

## 2019-11-13 PROCEDURE — 93010 ELECTROCARDIOGRAM REPORT: CPT | Performed by: INTERNAL MEDICINE

## 2019-11-13 PROCEDURE — 93010 ELECTROCARDIOGRAM REPORT: CPT | Mod: 76 | Performed by: INTERNAL MEDICINE

## 2019-11-13 PROCEDURE — 99220 PR INITIAL OBSERVATION CARE/DAY 70 MINUTES: CPT | Performed by: HOSPITALIST

## 2019-11-13 PROCEDURE — 200200 PR NO CHARGE: Performed by: HOSPITALIST

## 2019-11-13 PROCEDURE — A9502 TC99M TETROFOSMIN: HCPCS | Performed by: INTERNAL MEDICINE

## 2019-11-13 PROCEDURE — A9502 TC99M TETROFOSMIN: HCPCS | Performed by: HOSPITALIST

## 2019-11-13 PROCEDURE — 99215 OFFICE O/P EST HI 40 MIN: CPT | Performed by: INTERNAL MEDICINE

## 2019-11-13 PROCEDURE — 84484 ASSAY OF TROPONIN QUANT: CPT | Performed by: HOSPITALIST

## 2019-11-13 PROCEDURE — 78452 HT MUSCLE IMAGE SPECT MULT: CPT

## 2019-11-13 PROCEDURE — 36415 COLL VENOUS BLD VENIPUNCTURE: CPT | Performed by: HOSPITALIST

## 2019-11-13 PROCEDURE — 63700000 HC SELF-ADMINISTRABLE DRUG: Performed by: HOSPITALIST

## 2019-11-13 RX ORDER — ENOXAPARIN SODIUM 100 MG/ML
40 INJECTION SUBCUTANEOUS
Status: DISCONTINUED | OUTPATIENT
Start: 2019-11-13 | End: 2019-11-14

## 2019-11-13 RX ORDER — SENNOSIDES 8.6 MG/1
1 TABLET ORAL 2 TIMES DAILY PRN
Status: DISCONTINUED | OUTPATIENT
Start: 2019-11-13 | End: 2019-11-19 | Stop reason: HOSPADM

## 2019-11-13 RX ORDER — ACETAMINOPHEN 325 MG/1
650 TABLET ORAL EVERY 4 HOURS PRN
Status: DISCONTINUED | OUTPATIENT
Start: 2019-11-13 | End: 2019-11-19 | Stop reason: HOSPADM

## 2019-11-13 RX ORDER — DEXTROSE 50 % IN WATER (D50W) INTRAVENOUS SYRINGE
25 AS NEEDED
Status: DISCONTINUED | OUTPATIENT
Start: 2019-11-13 | End: 2019-11-16

## 2019-11-13 RX ORDER — ASPIRIN 81 MG/1
81 TABLET ORAL DAILY
Status: DISCONTINUED | OUTPATIENT
Start: 2019-11-13 | End: 2019-11-19 | Stop reason: HOSPADM

## 2019-11-13 RX ORDER — ROSUVASTATIN CALCIUM 20 MG/1
20 TABLET, COATED ORAL DAILY
Status: DISCONTINUED | OUTPATIENT
Start: 2019-11-13 | End: 2019-11-19 | Stop reason: HOSPADM

## 2019-11-13 RX ORDER — ALUMINUM HYDROXIDE, MAGNESIUM HYDROXIDE, AND SIMETHICONE 1200; 120; 1200 MG/30ML; MG/30ML; MG/30ML
30 SUSPENSION ORAL EVERY 4 HOURS PRN
Status: DISCONTINUED | OUTPATIENT
Start: 2019-11-13 | End: 2019-11-19 | Stop reason: HOSPADM

## 2019-11-13 RX ORDER — POTASSIUM CHLORIDE 14.9 MG/ML
20 INJECTION INTRAVENOUS AS NEEDED
Status: DISCONTINUED | OUTPATIENT
Start: 2019-11-13 | End: 2019-11-19 | Stop reason: HOSPADM

## 2019-11-13 RX ORDER — ONDANSETRON HYDROCHLORIDE 2 MG/ML
4 INJECTION, SOLUTION INTRAVENOUS EVERY 6 HOURS PRN
Status: DISCONTINUED | OUTPATIENT
Start: 2019-11-13 | End: 2019-11-19 | Stop reason: HOSPADM

## 2019-11-13 RX ORDER — PRASUGREL 10 MG/1
10 TABLET, FILM COATED ORAL DAILY
Status: DISCONTINUED | OUTPATIENT
Start: 2019-11-13 | End: 2019-11-19 | Stop reason: HOSPADM

## 2019-11-13 RX ORDER — PANTOPRAZOLE SODIUM 40 MG/1
40 TABLET, DELAYED RELEASE ORAL DAILY
Status: DISCONTINUED | OUTPATIENT
Start: 2019-11-13 | End: 2019-11-19 | Stop reason: HOSPADM

## 2019-11-13 RX ORDER — DEXTROSE 40 %
15-30 GEL (GRAM) ORAL AS NEEDED
Status: DISCONTINUED | OUTPATIENT
Start: 2019-11-13 | End: 2019-11-16

## 2019-11-13 RX ORDER — IBUPROFEN 200 MG
16-32 TABLET ORAL AS NEEDED
Status: DISCONTINUED | OUTPATIENT
Start: 2019-11-13 | End: 2019-11-16

## 2019-11-13 RX ADMIN — ASPIRIN 81 MG: 81 TABLET, COATED ORAL at 09:25

## 2019-11-13 RX ADMIN — PRASUGREL 10 MG: 10 TABLET, FILM COATED ORAL at 09:26

## 2019-11-13 RX ADMIN — TETROFOSMIN 30.1 MILLICURIE: 1.38 INJECTION, POWDER, LYOPHILIZED, FOR SOLUTION INTRAVENOUS at 14:45

## 2019-11-13 RX ADMIN — ROSUVASTATIN CALCIUM 20 MG: 20 TABLET, FILM COATED ORAL at 09:26

## 2019-11-13 RX ADMIN — TETROFOSMIN 11 MILLICURIE: 1.38 INJECTION, POWDER, LYOPHILIZED, FOR SOLUTION INTRAVENOUS at 13:35

## 2019-11-13 RX ADMIN — PANTOPRAZOLE SODIUM 40 MG: 40 TABLET, DELAYED RELEASE ORAL at 09:25

## 2019-11-13 ASSESSMENT — COGNITIVE AND FUNCTIONAL STATUS - GENERAL
TOILETING: 4 - NONE
DRESSING REGULAR LOWER BODY CLOTHING: 4 - NONE
WALKING IN HOSPITAL ROOM: 4 - NONE
DRESSING REGULAR UPPER BODY CLOTHING: 4 - NONE
HELP NEEDED FOR BATHING: 4 - NONE
CLIMB 3 TO 5 STEPS WITH RAILING: 4 - NONE
HELP NEEDED FOR PERSONAL GROOMING: 4 - NONE
STANDING UP FROM CHAIR USING ARMS: 4 - NONE
MOVING TO AND FROM BED TO CHAIR: 4 - NONE
EATING MEALS: 4 - NONE
DO YOU HAVE SERIOUS DIFFICULTY WALKING OR CLIMBING STAIRS: OTHER (SEE COMMENTS)

## 2019-11-13 NOTE — ED PROVIDER NOTES
HPI     Chief Complaint   Patient presents with   • Chest Pain         History provided by:  Patient  Chest Pain   Pain location:  Substernal area  Pain quality: pressure    Pain radiates to:  Does not radiate  Pain severity:  Moderate  Onset quality:  Gradual  Timing:  Constant  Progression:  Resolved  Chronicity:  New  Context: movement    Relieved by:  Rest and nitroglycerin  Worsened by:  Nothing  Associated symptoms: cough (chronically), diaphoresis (with chest discomfort), dizziness (after taking nitro) and shortness of breath (with exertion)    Associated symptoms: no abdominal pain, no altered mental status, no back pain, no claudication, no fatigue, no lower extremity edema, no nausea, no palpitations, no PND, no syncope, no vomiting and no weakness    Risk factors: coronary artery disease         Patient History     Past Medical History:   Diagnosis Date   • AAA (abdominal aortic aneurysm) (CMS/[facility])    • Arthritis    • Elevated blood pressure reading without diagnosis of hypertension 4/19/2019   • GERD (gastroesophageal reflux disease) 4/19/2019   • Glaucoma 4/19/2019   • Heart murmur    • Hiatal hernia    • History of hip replacement, total, right 4/19/2019    Right hip 9/ 2016 and revision 2017    • History of rheumatic fever as a child 4/19/2019   • Hypercholesterolemia 4/19/2019   • Ischemic cardiomyopathy 5/9/2019   • Osteoarthritis of multiple joints 4/19/2019   • Osteoporosis    • Raynaud's disease 4/19/2019   • RSD (reflex sympathetic dystrophy) 4/19/2019    Of left foot   • Status post insertion of drug eluting coronary artery stent 5/9/2019       Past Surgical History:   Procedure Laterality Date   • CHOLECYSTECTOMY OPEN     • CORONARY ANGIOPLASTY WITH STENT PLACEMENT  04/29/2019    of LAD   • CORONARY ANGIOPLASTY WITH STENT PLACEMENT  04/30/2019    of RCA   • DILATION AND CURETTAGE OF UTERUS     • EYE SURGERY      RIGHT CATARACT   • FOOT SURGERY Left    • JOINT REPLACEMENT Right 09/2016    total  hip   • REVISION TOTAL HIP ARTHROPLASTY Right 2017   • TONSILLECTOMY         Family History   Problem Relation Age of Onset   • Congenital heart disease Biological Mother         noted at autopsy   • Heart attack Paternal Grandfather        Social History     Tobacco Use   • Smoking status: Never Smoker   • Smokeless tobacco: Never Used   Substance Use Topics   • Alcohol use: Yes     Comment: VERY RARE   • Drug use: No       Systems Reviewed from Nursing Triage:          Review of Systems     Review of Systems   Constitutional: Positive for diaphoresis (with chest discomfort). Negative for fatigue.   Respiratory: Positive for cough (chronically) and shortness of breath (with exertion).    Cardiovascular: Positive for chest pain. Negative for palpitations, claudication, syncope and PND.   Gastrointestinal: Negative for abdominal pain, nausea and vomiting.   Musculoskeletal: Negative for back pain.   Neurological: Positive for dizziness (after taking nitro). Negative for weakness.   All other systems reviewed and are negative.       Physical Exam     ED Triage Vitals   Temp Heart Rate Resp BP SpO2   11/12/19 2205 11/12/19 2139 11/12/19 2205 11/12/19 2205 11/12/19 2139   36.8 °C (98.3 °F) 79 18 (!) 141/70 100 %      Temp Source Heart Rate Source Patient Position BP Location FiO2 (%) (Set)   11/12/19 2205 11/12/19 2205 11/12/19 2205 11/12/19 2205 --   Oral Monitor Sitting Right upper arm                      Patient Vitals for the past 24 hrs:   BP Temp Temp src Pulse Resp SpO2   11/12/19 2205 (!) 141/70 36.8 °C (98.3 °F) Oral 72 18 98 %   11/12/19 2139 -- -- -- 79 -- 100 %                                       Physical Exam   Constitutional: She appears well-developed and well-nourished. No distress.   HENT:   Head: Normocephalic and atraumatic.   Eyes: Conjunctivae are normal.   Neck: Neck supple.   Cardiovascular: Normal rate and regular rhythm.   No murmur heard.  Pulmonary/Chest: Effort normal and breath sounds  normal. No respiratory distress.   Abdominal: Soft. There is no tenderness.   Musculoskeletal: She exhibits no edema.   Neurological: She is alert.   Skin: Skin is warm and dry.   Psychiatric: She has a normal mood and affect.   Nursing note and vitals reviewed.           Procedures    ED Course & MDM     Labs Reviewed   CBC AND DIFFERENTIAL    Narrative:     The following orders were created for panel order CBC and differential.  Procedure                               Abnormality         Status                     ---------                               -----------         ------                     CBC[[id number]]                                                                         Diff Count[[id number]]                                                                    Please view results for these tests on the individual orders.   COMPREHENSIVE METABOLIC PANEL   TROPONIN I   CBC   DIFF COUNT       ECG 12 lead         X-RAY CHEST 2 VIEWS    (Results Pending)               [facility]         ED Course as of Nov 13 0653 Tue Nov 12, 2019   2242 Patient presents for evaluation of chest discomfort onset this evening while she was grocery shopping.  She describes a substernal pressure with associated diaphoresis.  When patient got home from the grocery store she lie down which improved her pain.  She then took a nitroglycerin and the pain resolved but she became very dizzy.  Patient with significant cardiac history requiring multiple stents placed in April.  At that time patient had similar chest pressure that radiated down both of her arms.  Patient did not have radiation today.  Patient reports medication compliance.  She endorses chronic shortness of breath that is also unchanged.  Will check basic blood, x-ray and reevaluate.  Of note EKG shows a normal sinus rhythm at 73 bpm with a normal axis normal ST segments and normal T waves.    [KR]   2350 Initial troponin negative.  Will admit to hospitalist service for  further evaluation and serial troponins.    [KR]      ED Course User Index  [KR] Krystina Pena MD         Clinical Impressions as of Nov 13 0653   Chest pain, unspecified type        Krystina Pena MD  11/13/19 0638

## 2019-11-13 NOTE — PLAN OF CARE
Problem: Adult Inpatient Plan of Care  Goal: Plan of Care Review  Outcome: Progressing  Flowsheets (Taken 11/13/2019 0414)  Progress: no change  Plan of Care Reviewed With: patient  Outcome Summary: pt. oriented to room and procedures. pt. resting in bed comfortably with call bell within reach. .all needs met at this time.  Goal: Patient-Specific Goal (Individualization)  Outcome: Progressing  Goal: Absence of Hospital-Acquired Illness or Injury  Outcome: Progressing  Goal: Optimal Comfort and Wellbeing  Outcome: Progressing  Goal: Readiness for Transition of Care  Outcome: Progressing  Goal: Rounds/Family Conference  Outcome: Progressing

## 2019-11-13 NOTE — PATIENT CARE CONFERENCE
Care Progression Rounds Note  Date: 11/13/2019  Time: 9:18 AM     Patient Name: Jennifer Sams     Medical Record Number: [id number]   YOB: 1945  Sex: Female      Room/Bed: 0227    Admitting Diagnosis: Chest pain [R07.9]  Chest pain, unspecified type [R07.9]   Admit Date/Time: 11/12/2019 10:17 PM    Primary Diagnosis: Chest pain  Principal Problem: Chest pain    GMLOS: pending  Anticipated Discharge Date: 11/13/2019    AM-PAC  Mobility Score: 24    Discharge Planning:       Barriers to Discharge:  Barriers to Discharge: Medical issues not resolved, Consultant recommendations pending    Participants:  advanced practice provider, social work/services, , nursing, occupational therapy, physical therapy, physician

## 2019-11-13 NOTE — CONSULTS
CARDIOLOGY CONSULT NOTE      Reason for consult:Chest pain  Referred by: Nahed Solitario MD   Cardiologist: Dr. Sherwin PENALOZA    Jennifer Sams is a 74 y.o. female who was admitted for Chest pain [R07.9]  Chest pain, unspecified type [R07.9].     Patient has a PMHx significant for non-STEMI 4/26/19, three-vessel CAD; status post FRANK x 2 LAD 4/27/19 + FRANK x 4 RCA 4/29/19 + FRANK x 1 LCX OM2 5/2/19, hypercholesterolemia with a high HDL cholesterol, and osteoarthritis of her hips who presented for evaluation of exertional left sided chest pressure.   Patient has been doing well since her PCI in April. Yesterday she was in a shopping center/walking when she started experiencing left sided chest pressure. No associated diaphoresis or dyspnea. She went back home and took her nitroglycerin. She cannot really tell whether or not this helped. She called a friend who brought her to [facility].    At the time of my exam patient had no chest pain.. She was switched from tiicagrelor to prasugrel because of a persistent cough. Her lisinopril and metoprolol were also discontinued.    Allergies: Ace inhibitors; Atorvastatin; Brilinta [ticagrelor]; Codeine; Dilaudid [hydromorphone]; Oxycodone; and Morphine sulfate    Home medications  •  aspirin, Take 1 tablet (81 mg total) by mouth daily.  •  chlorpheniramine, Take 4 mg by mouth every 6 (six) hours as needed for allergies.  •  nitroglycerin, Place 1 tablet (0.4 mg total) under the tongue every 5 (five) minutes as needed for chest pain.  •  pantoprazole, Take 40 mg by mouth daily.  •  prasugrel, Take 1 tablet (10 mg total) by mouth daily.  •  rosuvastatin, Take 1 tablet (20 mg total) by mouth daily.    Inpatient medications  •  acetaminophen, 650 mg, oral, q4h PRN  •  alum-mag hydroxide-simeth, 30 mL, oral, q4h PRN  •  aspirin, 81 mg, oral, Daily  •  glucose, 16-32 g of dextrose, oral, PRN **OR** dextrose, 15-30 g of dextrose, oral, PRN **OR** glucagon, 1 mg, intramuscular,  PRN **OR** dextrose in water, 25 mL, intravenous, PRN  •  enoxaparin, 40 mg, subcutaneous, Daily (6p)  •  ondansetron, 4 mg, intravenous, q6h PRN  •  pantoprazole, 40 mg, oral, Daily  •  potassium chloride, 20 mEq, intravenous, PRN  •  potassium chloride, 40 mEq, intravenous, PRN  •  prasugrel, 10 mg, oral, Daily  •  rosuvastatin, 20 mg, oral, Daily  •  senna, 1 tablet, oral, 2x daily PRN    History  Medical History:   Past Medical History:   Diagnosis Date   • AAA (abdominal aortic aneurysm) (CMS/[facility])    • Arthritis    • Elevated blood pressure reading without diagnosis of hypertension 4/19/2019   • GERD (gastroesophageal reflux disease) 4/19/2019   • Glaucoma 4/19/2019   • Heart murmur    • Hiatal hernia    • History of hip replacement, total, right 4/19/2019    Right hip 9/ 2016 and revision 2017    • History of rheumatic fever as a child 4/19/2019   • Hypercholesterolemia 4/19/2019   • Ischemic cardiomyopathy 5/9/2019   • Osteoarthritis of multiple joints 4/19/2019   • Osteoporosis    • Raynaud's disease 4/19/2019   • RSD (reflex sympathetic dystrophy) 4/19/2019    Of left foot   • Status post insertion of drug eluting coronary artery stent 5/9/2019       Surgical History:   Past Surgical History:   Procedure Laterality Date   • CHOLECYSTECTOMY OPEN     • CORONARY ANGIOPLASTY WITH STENT PLACEMENT  04/29/2019    of LAD   • CORONARY ANGIOPLASTY WITH STENT PLACEMENT  04/30/2019    of RCA   • DILATION AND CURETTAGE OF UTERUS     • EYE SURGERY      RIGHT CATARACT   • FOOT SURGERY Left    • JOINT REPLACEMENT Right 09/2016    total hip   • REVISION TOTAL HIP ARTHROPLASTY Right 2017   • TONSILLECTOMY         Social History:   Social History     Socioeconomic History   • Marital status: Single     Spouse name: None   • Number of children: None   • Years of education: None   • Highest education level: None   Occupational History   • None   Social Needs   • Financial resource strain: None   • Food insecurity:     Worry:  "None     Inability: None   • Transportation needs:     Medical: None     Non-medical: None   Tobacco Use   • Smoking status: Never Smoker   • Smokeless tobacco: Never Used   Substance and Sexual Activity   • Alcohol use: Yes     Comment: VERY RARE   • Drug use: No   • Sexual activity: Defer   Lifestyle   • Physical activity:     Days per week: None     Minutes per session: None   • Stress: None   Relationships   • Social connections:     Talks on phone: None     Gets together: None     Attends Jainism service: None     Active member of club or organization: None     Attends meetings of clubs or organizations: None     Relationship status: None   • Intimate partner violence:     Fear of current or ex partner: None     Emotionally abused: None     Physically abused: None     Forced sexual activity: None   Other Topics Concern   • None   Social History Narrative   • None       Family History:   Family History   Problem Relation Age of Onset   • Congenital heart disease Biological Mother         noted at autopsy   • Heart attack Paternal Grandfather          Review of Systems : Otherwise negative.      OBJECTIVE  Visit Vitals  BP (!) 153/75 (BP Location: Right upper arm, Patient Position: Lying)   Pulse 68   Temp 36.6 °C (97.8 °F) (Oral)   Resp 18   Ht 1.702 m (5' 7\")   Wt 63.5 kg (140 lb)   SpO2 95%   BMI 21.93 kg/m²       Physical Exam   Constitutional: Well-developed and well-nourished. No distress.   HENT: Normocephalic and atraumatic. Moist mucous membranes.  Eyes: EOM are normal. Pupils are equal, round, and reactive to light.   Neck: No JVD present. No carotid bruits.  Cardiovascular: Normal rate and regular rhythm. No murmur, rub or gallop. Intact and symmetrical distal pulses.  Pulmonary/Chest: Normal effort and air entry. No wheezing, rales, ronchi.  Abdominal: Normal bowel sounds. Soft, non tender, no distension. No rebound or guarding.   Musculoskeletal: No lower extremity edema or deformity. "   Neurological: Alert and oriented to person, place, and time.   Skin: Skin is warm and dry. No rash.  Psychiatric: Normal mood, affect and behavior.    Labs  Results from last 7 days   Lab Units 11/12/19  2246   SODIUM mEQ/L 140   POTASSIUM mEQ/L 3.8   CHLORIDE mEQ/L 107   CO2 mEQ/L 24   BUN mg/dL 14   CREATININE mg/dL 0.9   CALCIUM mg/dL 9.3   ALBUMIN g/dL 3.9   BILIRUBIN TOTAL mg/dL 0.7   ALK PHOS IU/L 68   ALT IU/L 21   AST IU/L 31   GLUCOSE mg/dL 107*       Results from last 7 days   Lab Units 11/13/19  0452 11/12/19  2246   TROPONIN I ng/mL <0.03 <0.03       Results from last 7 days   Lab Units 11/12/19  2246   WBC K/uL 6.19   HEMOGLOBIN g/dL 14.0   HEMATOCRIT % 42.1   PLATELETS K/uL 212         Cardiology results  ECG :  11/13/2019 - Normal sinus rhythm. Normal ECG.  11/12/2019: Normal sinus rhythm. Normal ECG  Telemetry :   TTE: 6/11/2019  Technically very difficult study.  Mild concentric left ventricular hypertrophy.  Normal cavity size.  Ejection fraction 55 to 60%.  Impaired left ventricular relaxation.  Mitral valve sclerosis.  Normal left atrium.  Aortic valve and root sclerosis with trace aortic insufficiency.  Normal right heart.  Thickened pericardium.  Compared to the study 4/27/2019, systolic function has normalized.    Catheterization: 4/29/2019  FINDINGS:  1. 95-99% mid LAD stenosis and a long 80% mid to distal LAD stenosis.   2. 90% proximal, 90% mid and 90-95% distal RCA lesions.   3. 95-99% proximal to mid OM 2 stenosis.   4. Elevated LVEDP (23 mmHg).     INTERVENTION:  1. Successful PCI of the mid LAD lesion with a  Resolute [place] 2.50 X 18mm FRANK, deployed at 12 ana, with no residual stenosis and ITALO 3 flow.  2. Successful PCI of the mid to distal LAD with over-lapping drug-eluting stents (Resolute Jason 2.0 x 26 and  Resolute [place] 2.0 mm x 30 mm FRANK), post-dilated with the stent balloon up to 14 ana, with no residual stenosis and ITALO 3 flow.     RECOMMENDATIONS:    1. DAPT with aspirin and  ticagrelor.   2. Return for PCI to the RCA and OM lesions.   3. Continue with aggressive risk factor modification and medical therapy    Cath 4/30/2019  STAGED INTERVENTION:  1. Successful PCI of the proximal RCA lesion with a Resolute [place] 3.0 x 15mm FRANK, post-dilated with a 3.5 x 12 mm non-compliant balloon to 20 ana, with no residual stenosis and ITALO 3 flow.  2. Successful PCI of the mid RCA lesion with a Resolute Jason 3.0 x 15mm FRANK, deployed at 16 ana, with no residual stenosis and ITALO 3 flow.  3. Successful PCI of the distal RCA and proximal PDA lesion with over-lapping drug-eluting stents (Resolute [place] 2.00 x 22mm FRANK and Resolute [place] 2.00 x 15mm FRANK), post-dilated with a 2.5 x 15 mm non-compliant balloon to 18 ana, with no residual stenosis and ITALO 3 flow.     RECOMMENDATIONS:    1. DAPT with aspirin and ticagrelor.   2. Continue with aggressive risk factor modification and medical therapy.   3. Return for PCI to OM 2    Cath 5/2/2019  STAGED INTERVENTION:  Successful PCI of the OM lesion with a Resolute [place] 2.00 x 30mm FRANK, post-dilated with an 2.5 x 20 mm non-compliant balloon to 16 ana, with no residual stenosis and ITALO 3 flow.     RECOMMENDATIONS:    1. DAPT with aspirin and ticagrelor.   2. Continue with aggressive risk factor modification and medical therapy.          ASSESSMENT AND PLAN  74 y.o. female, caridology being consulted for being consulted for: Chest pain  * Chest pain  Assessment & Plan  - Patient presents with chest pain. She has significant coronary artery disease but is currently chest pain free.  - She is compliant with her anti-platelets at home.  - It is reasonable to pursue inpatient exercise treadmill testing with MPI.  - She has hip pain but can walk on a treadmill.  - C/w DAPT and high dose statin.  - Consider re-initiation of low dose beta blockers - metoprolol succinate 25mg XL.  - Start anti-anginal ranolazine 500/500.  - Will discuss case with attending  shortly.      Barry Mendosa MD  11/13/2019  10:58 AM

## 2019-11-13 NOTE — PLAN OF CARE
Problem: Adult Inpatient Plan of Care  Goal: Plan of Care Review  Outcome: Progressing  Flowsheets (Taken 11/13/2019 4687)  Progress: no change  Plan of Care Reviewed With: patient  Outcome Summary: Pt unable to complete stress test; pt safety maintained throughout shift

## 2019-11-13 NOTE — ASSESSMENT & PLAN NOTE
- Patient presents with chest pain. She has significant coronary artery disease but is currently chest pain free.  - She is compliant with her anti-platelets at home.  - It is reasonable to pursue inpatient exercise treadmill testing with [facility].  - She has hip pain but can walk on a treadmill.  - C/w DAPT and high dose statin.  - Consider re-initiation of low dose beta blockers - metoprolol succinate 25mg XL.  - Start anti-anginal ranolazine 500/500.  - Will discuss case with attending shortly.

## 2019-11-13 NOTE — ASSESSMENT & PLAN NOTE
- trop negative x 2   - bnp 57; cxr nad   - continue dapt, statin  - exercise stress test terminated due to hypotension, bradycardia and shortness of breath; perfusion was normal at the workload achieved but the test is nondiagnostic due to poor exercise tolerance and inability to achieve target heart rate  - s/p cath with patent stents in the proximal and mid RCA, mid and distal LAD and proximal LCx into large OM1; 50% ostial LCx stenosis, iFR negative (1.0); normal left cardiac filling pressure, LVEDP 1 mmHg  - patient went into 2:1 AV block during cath 11/16  - ep c/s appreciated   - cards c/s appreciated   - PPM placed 11/18  - patient will need to follow up with EP in one week after discharge

## 2019-11-13 NOTE — PROGRESS NOTES
Hospital Medicine Service -  Daily Progress Note       SUBJECTIVE     For full H&P, please see note completed by my colleague earlier today. This is an addendum.       Interval History: Patient seen and evaluated this morning. She denied any further episodes of chest pain or pressure. She went for an exercise stress test with MPI and became hypotensive during exam, yielding the test as inconclusive.      OBJECTIVE      Vital signs in last 24 hours:  Temp:  [36.6 °C (97.8 °F)-36.9 °C (98.5 °F)] 36.6 °C (97.8 °F)  Heart Rate:  [60-97] 64  Resp:  [18-19] 18  BP: ()/(60-80) 80/60  No intake or output data in the 24 hours ending 11/13/19 1626    PHYSICAL EXAMINATION      Physical Exam   Constitutional: She is oriented to person, place, and time. She appears well-nourished. No distress.   HENT:   Head: Normocephalic and atraumatic.   Mouth/Throat: Oropharynx is clear and moist.   Eyes: Pupils are equal, round, and reactive to light. Conjunctivae and EOM are normal.   Neck: Normal range of motion. Neck supple.   Cardiovascular: Normal rate, regular rhythm and normal heart sounds.   Pulmonary/Chest: Effort normal and breath sounds normal. No respiratory distress.   Abdominal: Soft. Bowel sounds are normal. She exhibits no distension.   Musculoskeletal: Normal range of motion. She exhibits no edema.   Neurological: She is alert and oriented to person, place, and time.   Skin: Skin is warm and dry. No erythema.   Psychiatric: She has a normal mood and affect. Her behavior is normal. Thought content normal.   Nursing note and vitals reviewed.     LABS / IMAGING / TELE      Labs  Results from last 7 days   Lab Units 11/12/19  2246   SODIUM mEQ/L 140   POTASSIUM mEQ/L 3.8   CHLORIDE mEQ/L 107   CO2 mEQ/L 24   BUN mg/dL 14   CREATININE mg/dL 0.9   GLUCOSE mg/dL 107*   CALCIUM mg/dL 9.3     Results from last 7 days   Lab Units 11/12/19  2246   WBC K/uL 6.19   HEMOGLOBIN g/dL 14.0   HEMATOCRIT % 42.1   PLATELETS K/uL 212      Results from last 7 days   Lab Units 11/13/19  0452 11/12/19  2246   TROPONIN I ng/mL <0.03 <0.03         Imaging  Chest Xray 11/13/19  IMPRESSION:  No active disease.    ECG/Telemetry  I have independently reviewed the telemetry. No events for the last 24 hours.    ASSESSMENT AND PLAN      * Chest pain  Assessment & Plan  - trop negative x 2   - cards consulted, recs apprec   - BNP 57   - continue telemetry monitoring   - cont DAPT, statin  - ddx includes cards vs. GI vs. msk  - exercise stress test ordered, although inconclusive, as patient became hypotensive during testing   - will follow up cards final recs     Coronary artery disease involving native coronary artery of native heart with angina pectoris (CMS/HCC)  Assessment & Plan  - c/b NSTEMI 4/26/19, three-vessel CAD; status post FRANK x 2 LAD 4/27/19 + FRANK x 4 RCA 4/29/19 + FRANK x 1 LCX OM2 5/2/19 with resolved ischemic cardiomyopathy  - cont DAPT, statin  - cards consulted as patient is known to Dr. Courtney, recs apprec      Elevated blood pressure reading without diagnosis of hypertension  Assessment & Plan  - may be due to anxiety; trend    GERD (gastroesophageal reflux disease)  Assessment & Plan  - cont PPI    Hypercholesterolemia  Assessment & Plan  - cont statin       VTE Assessment: Padua VTE Score: 1  VTE Prophylaxis Plan: ambulation  Code Status: Full Code  Estimated Discharge Date: 11/14/2019  Disposition Planning: Home pending cardiac work up      ARIAN Melvin  11/13/2019

## 2019-11-13 NOTE — H&P
"   Hospital Medicine Service -  History & Physical        CHIEF COMPLAINT   \"I had this pressure in my chest.\"     HISTORY OF PRESENT ILLNESS      Jennifer Sams is a 73 y/o F PMH CAD c/b NSTEMI 4/26/19, three-vessel CAD; status post FRANK x 2 LAD 4/27/19 + FRANK x 4 RCA 4/29/19 + FRANK x 1 LCX OM2 5/2/19, hypercholesterolemia with a high HDL cholesterol, resolved ischemic cardiomyopathy, hiatal hernia c/b GERD, and osteoarthritis of her hips p/w chest pressure. Pt. was not feeling well on Monday but did not think much of it. On Tuesday, she was running errands, and at her second stop, while grocery shopping, she developed substernal chest pressure, nonradiated, without dyspnea or nausea but with diaphoresis. She is unable to quantify it other than \"moderate\". She drove a few blocks home and lay down and took a nitroglycerine - this did not help the pain, but when she stood up, she was severely lightheaded. She lay back down and the feeling passed after a few minutes but she was \"spooked\" and very anxious so presented to the ED. Pt. states this was similar to the pain she had during her NSTEMI in April though was less severe, without associated dyspnea, and lacked radiation down her arm. Pt. has been having severe allergic rhinitis with sinus congestion for the past 2 days and took chlorpheniramine with phenylephrine on Tuesday morning.    PAST MEDICAL AND SURGICAL HISTORY      Past Medical History:   Diagnosis Date   • AAA (abdominal aortic aneurysm) (CMS/[facility])    • Arthritis    • Elevated blood pressure reading without diagnosis of hypertension 4/19/2019   • GERD (gastroesophageal reflux disease) 4/19/2019   • Glaucoma 4/19/2019   • Heart murmur    • Hiatal hernia    • History of hip replacement, total, right 4/19/2019    Right hip 9/ 2016 and revision 2017    • History of rheumatic fever as a child 4/19/2019   • Hypercholesterolemia 4/19/2019   • Ischemic cardiomyopathy 5/9/2019   • Osteoarthritis of multiple joints " 4/19/2019   • Osteoporosis    • Raynaud's disease 4/19/2019   • RSD (reflex sympathetic dystrophy) 4/19/2019    Of left foot   • Status post insertion of drug eluting coronary artery stent 5/9/2019       Past Surgical History:   Procedure Laterality Date   • CHOLECYSTECTOMY OPEN     • CORONARY ANGIOPLASTY WITH STENT PLACEMENT  04/29/2019    of LAD   • CORONARY ANGIOPLASTY WITH STENT PLACEMENT  04/30/2019    of RCA   • DILATION AND CURETTAGE OF UTERUS     • EYE SURGERY      RIGHT CATARACT   • FOOT SURGERY Left    • JOINT REPLACEMENT Right 09/2016    total hip   • REVISION TOTAL HIP ARTHROPLASTY Right 2017   • TONSILLECTOMY         PCP: Anitra Way MD    MEDICATIONS      Prior to Admission medications    Medication Sig Start Date End Date Taking? Authorizing Provider   aspirin (ASPIR-81) 81 mg enteric coated tablet Take 1 tablet (81 mg total) by mouth daily. 4/19/19 11/12/19 Yes Tomasz Courtney MD   chlorpheniramine (CHLOR-TRIMETON) 4 mg tablet Take 4 mg by mouth every 6 (six) hours as needed for allergies.   Yes Provider, MD Amarilis   nitroglycerin (NITROSTAT) 0.4 mg SL tablet Place 1 tablet (0.4 mg total) under the tongue every 5 (five) minutes as needed for chest pain. 4/19/19  Yes Tomasz Courtney MD   pantoprazole (PROTONIX) 40 mg EC tablet Take 40 mg by mouth daily.   Yes Provider, MD Amarilis   prasugrel (EFFIENT) 10 mg tablet Take 1 tablet (10 mg total) by mouth daily. 5/9/19 11/12/19 Yes Tomasz Courtney MD   rosuvastatin (CRESTOR) 20 mg tablet Take 1 tablet (20 mg total) by mouth daily. 5/9/19 11/12/19 Yes Tomasz Courtney MD       ALLERGIES      Ace inhibitors; Atorvastatin; Brilinta [ticagrelor]; Codeine; Dilaudid [hydromorphone]; Oxycodone; and Morphine sulfate    FAMILY HISTORY      Family History   Problem Relation Age of Onset   • Congenital heart disease Biological Mother         noted at autopsy   • Heart attack Paternal Grandfather        SOCIAL HISTORY      Social History      Socioeconomic History   • Marital status: Single     Spouse name: None   • Number of children: None   • Years of education: None   • Highest education level: None   Occupational History   • None   Social Needs   • Financial resource strain: None   • Food insecurity:     Worry: None     Inability: None   • Transportation needs:     Medical: None     Non-medical: None   Tobacco Use   • Smoking status: Never Smoker   • Smokeless tobacco: Never Used   Substance and Sexual Activity   • Alcohol use: Yes     Comment: VERY RARE   • Drug use: No   • Sexual activity: Defer   Lifestyle   • Physical activity:     Days per week: None     Minutes per session: None   • Stress: None   Relationships   • Social connections:     Talks on phone: None     Gets together: None     Attends Religion service: None     Active member of club or organization: None     Attends meetings of clubs or organizations: None     Relationship status: None   • Intimate partner violence:     Fear of current or ex partner: None     Emotionally abused: None     Physically abused: None     Forced sexual activity: None   Other Topics Concern   • None   Social History Narrative   • None       REVIEW OF SYSTEMS      All other systems reviewed and negative except as noted in HPI    PHYSICAL EXAMINATION      Temp:  [36.8 °C (98.3 °F)] 36.8 °C (98.3 °F)  Heart Rate:  [65-79] 68  Resp:  [18-19] 19  BP: (135-160)/(70-79) 147/71  There is no height or weight on file to calculate BMI.    Physical Exam  Gen: WDWN female lying in bed, NAD   Vitals: 98.3, 70, 18, 135/74, 99% on RA   HEENT: NCAT, PERRL, EOMI, shotty anterior cervical lymphadenopathy  CV: RRR, +S1, +S2, no m/r/g  Pulm: CTA b/l   Abd: soft, NT, ND, +bs  Ext: wwp, no c/c/e, 2+ DP pulses   Neuro: AAOx3, nonfocal  Psych: pleasant, cooperative   Derm: no rashes   : no dove, no CVA tenderness      LABS / IMAGING / EKG        Labs  glucose 108, Cr 0.8, trop < 0.03, CBC wnl, BNP 57    Imaging  CXR:  NAD    ECG/Telemetry  NSR, no ST or T wave abnormalities markedly improved from prior    ASSESSMENT AND PLAN           * Chest pain  Assessment & Plan  - given risk factors, will trend trop and EKG and consult cardiology  - cardiac monitoring  - cont DAPT, statin  - ddx includes cards vs. GI vs. msk    Coronary artery disease involving native coronary artery of native heart with angina pectoris (CMS/[facility])  Assessment & Plan  - c/b NSTEMI 4/26/19, three-vessel CAD; status post FRANK x 2 LAD 4/27/19 + FRANK x 4 RCA 4/29/19 + FRANK x 1 LCX OM2 5/2/19 with resolved ischemic cardiomyopathy  - cont DAPT, statin  - Dr. Courtney cardiology consultation      Elevated blood pressure reading without diagnosis of hypertension  Assessment & Plan  - may be due to anxiety; trend    GERD (gastroesophageal reflux disease)  Assessment & Plan  - cont PPI    Hypercholesterolemia  Assessment & Plan  - cont statin         VTE Assessment: Padua    VTE Prophylaxis Plan: Lovenox  Code Status: Full Code  Estimated Discharge Date: 11/13/2019  Disposition Planning: likely home tomorrow     Chanel Bourne MD  11/13/2019

## 2019-11-13 NOTE — ASSESSMENT & PLAN NOTE
- STEMI 4/26/19, three-vessel CAD; status post FRANK x 2 LAD 4/27/19 + FRANK x 4 RCA 4/29/19 + FRANK x 1 LCX OM2 5/2/19 with resolved ischemic cardiomyopathy  - s/p cath with patent stents in the proximal and mid RCA, mid and distal LAD and proximal LCx into large OM1; 50% ostial LCx stenosis, iFR negative (1.0); normal left cardiac filling pressure, LVEDP 1 mmHg  - continue dapt, statin  - cards c/s appreciated

## 2019-11-13 NOTE — PLAN OF CARE
Problem: Adult Inpatient Plan of Care  Goal: Readiness for Transition of Care  Intervention: Mutually Develop Transition Plan  Flowsheets (Taken 11/13/2019 6600)  Anticipated Discharge Disposition: home without services  Equipment Needed After Discharge: none  Equipment Currently Used at Home: none  Anticipated Changes Related to Illness: none  Current Discharge Risk: chronically ill  Readmission Within the Last 30 Days: no previous admission in last 30 days  Patient/Family Anticipated Services at Transition: none  Patient/Family Anticipates Transition to: home  Transportation Anticipated: family or friend will provide  Concerns to be Addressed: no discharge needs identified  Offered/Gave Vendor List: yes     Per medical rounds, pt potentially stable for d/c today to home pending cardiology recommendations.  Pt lives with friend at home where she states she is independent.  Denies use of DME, hx of HC.  Has been to SNF at [facility] and [facility].  Verified pcp/pharm/emerg contact with pt.  Friend to provide d/c transport.  No d/c needs identified at this time.  Care coordination remains available.

## 2019-11-13 NOTE — PROGRESS NOTES
Patient did not tolerate exercise stress test, became hypotensive. Test was stopped and study nondiagnostic. Will need to discuss with cardiology what additional work-up should be done.

## 2019-11-14 PROCEDURE — 21400000 HC ROOM AND CARE CCU/INTERMEDIATE

## 2019-11-14 PROCEDURE — G0378 HOSPITAL OBSERVATION PER HR: HCPCS

## 2019-11-14 PROCEDURE — 99233 SBSQ HOSP IP/OBS HIGH 50: CPT | Performed by: INTERNAL MEDICINE

## 2019-11-14 PROCEDURE — 99231 SBSQ HOSP IP/OBS SF/LOW 25: CPT | Performed by: INTERNAL MEDICINE

## 2019-11-14 PROCEDURE — 63700000 HC SELF-ADMINISTRABLE DRUG: Performed by: HOSPITALIST

## 2019-11-14 RX ADMIN — ROSUVASTATIN CALCIUM 20 MG: 20 TABLET, FILM COATED ORAL at 09:09

## 2019-11-14 RX ADMIN — ASPIRIN 81 MG: 81 TABLET, COATED ORAL at 09:09

## 2019-11-14 RX ADMIN — PRASUGREL 10 MG: 10 TABLET, FILM COATED ORAL at 09:10

## 2019-11-14 RX ADMIN — PANTOPRAZOLE SODIUM 40 MG: 40 TABLET, DELAYED RELEASE ORAL at 09:09

## 2019-11-14 NOTE — PLAN OF CARE
Problem: Adult Inpatient Plan of Care  Goal: Plan of Care Review  Flowsheets (Taken 11/14/2019 0435)  Progress: improving  Plan of Care Reviewed With: patient  Outcome Summary: pt had no c/o chest pain during this shift.

## 2019-11-14 NOTE — PROGRESS NOTES
Hospital Medicine Service -  Daily Progress Note       SUBJECTIVE   Interval History: Patient denies CP or  this morning. Opted out of enoxaparin injections. Awaiting cardiac cath with Dr. Moncada tomorrow.      OBJECTIVE      Vital signs in last 24 hours:  Temp:  [36.5 °C (97.7 °F)-36.8 °C (98.2 °F)] 36.5 °C (97.7 °F)  Heart Rate:  [62-97] 71  Resp:  [] 16  BP: ()/(60-94) 140/67  No intake or output data in the 24 hours ending 19 1012    PHYSICAL EXAMINATION      Physical Exam   Constitutional: She is oriented to person, place, and time. She appears well-developed and well-nourished.   HENT:   Head: Normocephalic and atraumatic.   Mouth/Throat: Oropharynx is clear and moist.   Eyes: Pupils are equal, round, and reactive to light. Conjunctivae and EOM are normal.   Neck: Normal range of motion. Neck supple.   Cardiovascular: Normal rate, regular rhythm and normal heart sounds.   Pulmonary/Chest: Effort normal and breath sounds normal. No respiratory distress.   Abdominal: Soft. Bowel sounds are normal.   Musculoskeletal: Normal range of motion. She exhibits no edema.   Neurological: She is alert and oriented to person, place, and time.   Skin: Skin is warm and dry.   Psychiatric: She has a normal mood and affect. Her behavior is normal. Judgment and thought content normal.   Nursing note and vitals reviewed.     LABS / IMAGING / TELE      Labs  Results from last 7 days   Lab Units 19  2246   SODIUM mEQ/L 140   POTASSIUM mEQ/L 3.8   CHLORIDE mEQ/L 107   CO2 mEQ/L 24   BUN mg/dL 14   CREATININE mg/dL 0.9   GLUCOSE mg/dL 107*   CALCIUM mg/dL 9.3     Results from last 7 days   Lab Units 19  2246   WBC K/uL 6.19   HEMOGLOBIN g/dL 14.0   HEMATOCRIT % 42.1   PLATELETS K/uL 212         Imaging  I have independently reviewed the pertinent imaging from the last 24 hrs.    ECG/Telemetry  I have independently reviewed the telemetry. Significant findings include sinus bradycardia .    ASSESSMENT  AND PLAN      * Chest pain  Assessment & Plan  - trop negative x 2   - cards consulted, recs apprec   - BNP 57   - continue telemetry monitoring   - cont DAPT, statin  - ddx includes cards vs. GI vs. msk  - exercise stress test ordered, although inconclusive, as patient became hypotensive during testing   - cardiac catheterization scheduled with Dr. Moncada Friday 11/15- as per cards, patient cannot be discharged home until cath completed   - cardiac diet for now, NPO at midnight     Coronary artery disease involving native coronary artery of native heart with angina pectoris (CMS/[facility])  Assessment & Plan  - c/b NSTEMI 4/26/19, three-vessel CAD; status post FRANK x 2 LAD 4/27/19 + FRANK x 4 RCA 4/29/19 + FRANK x 1 LCX OM2 5/2/19 with resolved ischemic cardiomyopathy  - cont DAPT, statin  - cards consulted as patient is known to Dr. Courtney, recs apprec  - plan for cardiac cath tomorrow 11/15 with Dr. Moncada     Elevated blood pressure reading without diagnosis of hypertension  Assessment & Plan  - BP stable this AM  - continue to trend     GERD (gastroesophageal reflux disease)  Assessment & Plan  - cont PPI    Hypercholesterolemia  Assessment & Plan  - cont statin         VTE Assessment: Padua VTE Score: 1  VTE Prophylaxis Plan: Ambulation  Code Status: Full Code  Estimated Discharge Date: 11/15/2019  Disposition Planning: home pending cardiac cath tomorrow      ARIAN Melvin  11/14/2019

## 2019-11-14 NOTE — PROGRESS NOTES
Cardiology Daily Progress Note    Subjective/Objective:  No chest pain overnight.  Has agreed to undergo diagnostic catheterization only wants to have this performed by Dr. Moncada    Review of systems: No headaches, no visual disturbances, all other systems reviewed and are noncontributory    Physical exam: Well-developed well-nourished elderly female  Vital signs stable  Lungs clear  Cardiac regular rate, apical S4, soft basal systolic murmur  Abdomen: Soft  Extremities unremarkable  Neuro: No focal motor or sensory deficits mood and affect are appropriate patient is very anxious    Laboratory data: All pertinent lab studies were personally reviewed and discussed with the patient    Assessment/Plan   1.  Recurrent chest pain in a patient with extensive coronary artery disease and multivessel PCI.  I agree the patient needs to undergo diagnostic catheterization.  I have spoken directly to Dr. Moncada who is happy to perform this procedure but cannot do this until Friday, November 15.  I do not feel it safe for the patient to be discharged to home prior to her catheterization.  Patient will need to remain in the hospital pending results of catheterization.  The patient's present medical regimen should be continued.  The risks and benefits of catheterization were discussed in detail with the patient yesterday and again today         Expected Discharge Date:  11/14/2019

## 2019-11-14 NOTE — UM PHYSICIAN REVIEW NOTE
Upgrade to inpatient for expected 2 night hospitalization for chest pain and hypotension during stress test.

## 2019-11-15 LAB
ANION GAP SERPL CALC-SCNC: 10 MEQ/L (ref 3–15)
BUN SERPL-MCNC: 14 MG/DL (ref 8–20)
CALCIUM SERPL-MCNC: 9.3 MG/DL (ref 8.9–10.3)
CHLORIDE SERPL-SCNC: 108 MEQ/L (ref 98–109)
CO2 SERPL-SCNC: 22 MEQ/L (ref 22–32)
CREAT SERPL-MCNC: 1 MG/DL
GFR SERPL CREATININE-BSD FRML MDRD: 54.2 ML/MIN/1.73M*2
GLUCOSE SERPL-MCNC: 106 MG/DL (ref 70–99)
POTASSIUM SERPL-SCNC: 4 MEQ/L (ref 3.6–5.1)
SODIUM SERPL-SCNC: 140 MEQ/L (ref 136–144)

## 2019-11-15 PROCEDURE — 99232 SBSQ HOSP IP/OBS MODERATE 35: CPT | Performed by: INTERNAL MEDICINE

## 2019-11-15 PROCEDURE — 63600000 HC DRUGS/DETAIL CODE: Performed by: INTERNAL MEDICINE

## 2019-11-15 PROCEDURE — 84443 ASSAY THYROID STIM HORMONE: CPT | Performed by: HOSPITALIST

## 2019-11-15 PROCEDURE — 27200000 HC STERILE SUPPLY: Performed by: INTERNAL MEDICINE

## 2019-11-15 PROCEDURE — 93458 L HRT ARTERY/VENTRICLE ANGIO: CPT | Mod: 26 | Performed by: INTERNAL MEDICINE

## 2019-11-15 PROCEDURE — 99152 MOD SED SAME PHYS/QHP 5/>YRS: CPT | Performed by: INTERNAL MEDICINE

## 2019-11-15 PROCEDURE — 63700000 HC SELF-ADMINISTRABLE DRUG: Performed by: HOSPITALIST

## 2019-11-15 PROCEDURE — C1769 GUIDE WIRE: HCPCS | Performed by: INTERNAL MEDICINE

## 2019-11-15 PROCEDURE — 99153 MOD SED SAME PHYS/QHP EA: CPT | Performed by: INTERNAL MEDICINE

## 2019-11-15 PROCEDURE — 93571 IV DOP VEL&/PRESS C FLO 1ST: CPT | Performed by: INTERNAL MEDICINE

## 2019-11-15 PROCEDURE — 63600105 HC IODINE BASED CONTRAST: Mod: JW | Performed by: INTERNAL MEDICINE

## 2019-11-15 PROCEDURE — C1894 INTRO/SHEATH, NON-LASER: HCPCS | Performed by: INTERNAL MEDICINE

## 2019-11-15 PROCEDURE — 99223 1ST HOSP IP/OBS HIGH 75: CPT | Performed by: INTERNAL MEDICINE

## 2019-11-15 PROCEDURE — 80048 BASIC METABOLIC PNL TOTAL CA: CPT | Performed by: NURSE PRACTITIONER

## 2019-11-15 PROCEDURE — B211YZZ FLUOROSCOPY OF MULTIPLE CORONARY ARTERIES USING OTHER CONTRAST: ICD-10-PCS | Performed by: INTERNAL MEDICINE

## 2019-11-15 PROCEDURE — 4A033BC MEASUREMENT OF ARTERIAL PRESSURE, CORONARY, PERCUTANEOUS APPROACH: ICD-10-PCS | Performed by: INTERNAL MEDICINE

## 2019-11-15 PROCEDURE — C1887 CATHETER, GUIDING: HCPCS | Performed by: INTERNAL MEDICINE

## 2019-11-15 PROCEDURE — 93454 CORONARY ARTERY ANGIO S&I: CPT | Performed by: INTERNAL MEDICINE

## 2019-11-15 PROCEDURE — 25000000 HC PHARMACY GENERAL: Performed by: INTERNAL MEDICINE

## 2019-11-15 PROCEDURE — 99232 SBSQ HOSP IP/OBS MODERATE 35: CPT | Mod: 25 | Performed by: INTERNAL MEDICINE

## 2019-11-15 PROCEDURE — 36415 COLL VENOUS BLD VENIPUNCTURE: CPT | Performed by: NURSE PRACTITIONER

## 2019-11-15 PROCEDURE — 21400000 HC ROOM AND CARE CCU/INTERMEDIATE

## 2019-11-15 RX ORDER — HEPARIN SODIUM 1000 [USP'U]/ML
INJECTION, SOLUTION INTRAVENOUS; SUBCUTANEOUS AS NEEDED
Status: DISCONTINUED | OUTPATIENT
Start: 2019-11-15 | End: 2019-11-15 | Stop reason: HOSPADM

## 2019-11-15 RX ORDER — IODIXANOL 320 MG/ML
INJECTION, SOLUTION INTRAVASCULAR AS NEEDED
Status: DISCONTINUED | OUTPATIENT
Start: 2019-11-15 | End: 2019-11-15 | Stop reason: HOSPADM

## 2019-11-15 RX ORDER — LIDOCAINE HYDROCHLORIDE 10 MG/ML
INJECTION, SOLUTION INFILTRATION; PERINEURAL AS NEEDED
Status: DISCONTINUED | OUTPATIENT
Start: 2019-11-15 | End: 2019-11-15 | Stop reason: HOSPADM

## 2019-11-15 RX ORDER — MIDAZOLAM HYDROCHLORIDE 2 MG/2ML
INJECTION, SOLUTION INTRAMUSCULAR; INTRAVENOUS AS NEEDED
Status: DISCONTINUED | OUTPATIENT
Start: 2019-11-15 | End: 2019-11-15 | Stop reason: HOSPADM

## 2019-11-15 RX ADMIN — ROSUVASTATIN CALCIUM 20 MG: 20 TABLET, FILM COATED ORAL at 08:15

## 2019-11-15 RX ADMIN — SALINE NASAL SPRAY 2 SPRAY: 1.5 SOLUTION NASAL at 23:14

## 2019-11-15 RX ADMIN — PRASUGREL 10 MG: 10 TABLET, FILM COATED ORAL at 08:15

## 2019-11-15 RX ADMIN — ASPIRIN 81 MG: 81 TABLET, COATED ORAL at 08:15

## 2019-11-15 RX ADMIN — PANTOPRAZOLE SODIUM 40 MG: 40 TABLET, DELAYED RELEASE ORAL at 08:15

## 2019-11-15 ASSESSMENT — ENCOUNTER SYMPTOMS: ALLERGIC REACTION: 1

## 2019-11-15 NOTE — CONSULTS
ELECTROPHYSIOLOGY CONSULT NOTE      Reason for consult: 2:1 AV Block  Referred by: Juli Thrasher MD      SUBJECTIVE    Jennifer Sams is a 74 y.o. female who was admitted for Chest pain [R07.9]  Chest pain, unspecified type [R07.9]  Chest pain, unspecified type [R07.9].     74F with pmh CAD s/p multiple stents, HLD. Pt presented with CP.  Patient had an STEMI and PCI in April of this year.  Patient was doing well for several months.  For the past couple days patient has been having some chest pressure with exertion.  Patient took nitro which did not really help.  Patient came to the hospital for further evaluation.  Patient was admitted.  Patient had a nuclear exercise stress 2 days ago.  After being on the treadmill for just 2 minutes patient became hypotension to the 80s systolic and bradycardic to the 50s.  Patient had left heart catheterization today which showed patent stents in the RCA, LAD, and OM1, and an IFR negative lesion in the circumflex.  Patient today with noted on telemetry to be in 2-1 AV block.  Patient had been in this rhythm yesterday as well but it was not noticed.  Patient denies any history of syncope or lightheadedness.  Patient does note dyspnea on exertion, however.  EP consulted for further recommendations.      Allergies: Ace inhibitors; Atorvastatin; Brilinta [ticagrelor]; Codeine; Dilaudid [hydromorphone]; Onion; Oxycodone; and Morphine sulfate    Home medications  •  aspirin, Take 1 tablet (81 mg total) by mouth daily.  •  chlorpheniramine, Take 4 mg by mouth every 6 (six) hours as needed for allergies.  •  nitroglycerin, Place 1 tablet (0.4 mg total) under the tongue every 5 (five) minutes as needed for chest pain.  •  pantoprazole, Take 40 mg by mouth daily.  •  prasugrel, Take 1 tablet (10 mg total) by mouth daily.  •  rosuvastatin, Take 1 tablet (20 mg total) by mouth daily.    Inpatient medications  •  acetaminophen, 650 mg, oral, q4h PRN  •  alum-mag hydroxide-simeth, 30  mL, oral, q4h PRN  •  aspirin, 81 mg, oral, Daily  •  glucose, 16-32 g of dextrose, oral, PRN **OR** dextrose, 15-30 g of dextrose, oral, PRN **OR** glucagon, 1 mg, intramuscular, PRN **OR** dextrose in water, 25 mL, intravenous, PRN  •  ondansetron, 4 mg, intravenous, q6h PRN  •  pantoprazole, 40 mg, oral, Daily  •  potassium chloride, 20 mEq, intravenous, PRN  •  potassium chloride, 40 mEq, intravenous, PRN  •  prasugrel, 10 mg, oral, Daily  •  rosuvastatin, 20 mg, oral, Daily  •  senna, 1 tablet, oral, 2x daily PRN    History  Medical History:   Past Medical History:   Diagnosis Date   • AAA (abdominal aortic aneurysm) (CMS/[facility])    • Arthritis    • Elevated blood pressure reading without diagnosis of hypertension 4/19/2019   • GERD (gastroesophageal reflux disease) 4/19/2019   • Glaucoma 4/19/2019   • Heart murmur    • Hiatal hernia    • History of hip replacement, total, right 4/19/2019    Right hip 9/ 2016 and revision 2017    • History of rheumatic fever as a child 4/19/2019   • Hypercholesterolemia 4/19/2019   • Ischemic cardiomyopathy 5/9/2019   • Osteoarthritis of multiple joints 4/19/2019   • Osteoporosis    • Raynaud's disease 4/19/2019   • RSD (reflex sympathetic dystrophy) 4/19/2019    Of left foot   • Status post insertion of drug eluting coronary artery stent 5/9/2019       Surgical History:   Past Surgical History:   Procedure Laterality Date   • CHOLECYSTECTOMY OPEN     • CORONARY ANGIOPLASTY WITH STENT PLACEMENT  04/29/2019    of LAD   • CORONARY ANGIOPLASTY WITH STENT PLACEMENT  04/30/2019    of RCA   • DILATION AND CURETTAGE OF UTERUS     • EYE SURGERY      RIGHT CATARACT   • FOOT SURGERY Left    • JOINT REPLACEMENT Right 09/2016    total hip   • REVISION TOTAL HIP ARTHROPLASTY Right 2017   • TONSILLECTOMY         Social History:   Social History     Socioeconomic History   • Marital status: Single     Spouse name: None   • Number of children: None   • Years of education: None   • Highest  "education level: None   Occupational History   • None   Social Needs   • Financial resource strain: None   • Food insecurity:     Worry: None     Inability: None   • Transportation needs:     Medical: None     Non-medical: None   Tobacco Use   • Smoking status: Never Smoker   • Smokeless tobacco: Never Used   Substance and Sexual Activity   • Alcohol use: Yes     Comment: VERY RARE   • Drug use: No   • Sexual activity: Defer   Lifestyle   • Physical activity:     Days per week: None     Minutes per session: None   • Stress: None   Relationships   • Social connections:     Talks on phone: None     Gets together: None     Attends Jain service: None     Active member of club or organization: None     Attends meetings of clubs or organizations: None     Relationship status: None   • Intimate partner violence:     Fear of current or ex partner: None     Emotionally abused: None     Physically abused: None     Forced sexual activity: None   Other Topics Concern   • None   Social History Narrative   • None       Family History:   Family History   Problem Relation Age of Onset   • Congenital heart disease Biological Mother         noted at autopsy   • Heart attack Paternal Grandfather          Review of Systems : Otherwise negative.      OBJECTIVE  Visit Vitals  /72 (BP Location: Right upper arm, Patient Position: Lying)   Pulse 75   Temp 36.6 °C (97.8 °F) (Oral)   Resp 18   Ht 1.702 m (5' 7\")   Wt 63.5 kg (140 lb)   SpO2 97%   BMI 21.93 kg/m²       Physical Exam   Constitutional: Well-developed and well-nourished. No distress.   HENT: Normocephalic and atraumatic. Moist mucous membranes.  Eyes: EOM are normal. Pupils are equal, round, and reactive to light.   Neck: No JVD present. No carotid bruits.  Cardiovascular: Normal rate and regular rhythm. No murmur, rub or gallop. Intact and symmetrical distal pulses.  Pulmonary/Chest: Normal effort and air entry. No wheezing, rales, ronchi.  Abdominal: Normal bowel " sounds. Soft, non tender, no distension. No rebound or guarding.   Musculoskeletal: No lower extremity edema or deformity.   Neurological: Alert and oriented to person, place, and time.   Skin: Skin is warm and dry. No rash.  Psychiatric: Normal mood, affect and behavior.    Labs  Results from last 7 days   Lab Units 11/15/19  0847 11/12/19  2246   SODIUM mEQ/L 140 140   POTASSIUM mEQ/L 4.0 3.8   CHLORIDE mEQ/L 108 107   CO2 mEQ/L 22 24   BUN mg/dL 14 14   CREATININE mg/dL 1.0 0.9   CALCIUM mg/dL 9.3 9.3   ALBUMIN g/dL  --  3.9   BILIRUBIN TOTAL mg/dL  --  0.7   ALK PHOS IU/L  --  68   ALT IU/L  --  21   AST IU/L  --  31   GLUCOSE mg/dL 106* 107*       Results from last 7 days   Lab Units 11/13/19  0452 11/12/19  2246   TROPONIN I ng/mL <0.03 <0.03       Results from last 7 days   Lab Units 11/12/19  2246   WBC K/uL 6.19   HEMOGLOBIN g/dL 14.0   HEMATOCRIT % 42.1   PLATELETS K/uL 212         Cardiology results  ECG : Normal sinus rhythm   Telemetry : Sinus rhythm with intermittent 2-1 AV block.  2-1 AV block noted when patient walks.  Had patient walk down the tobias and heart rate dropped from 80s to 50s and patient went into 2-1 AV block on telemetry.  TTE:    6/11/19    Technically very difficult study.  Mild concentric left ventricular hypertrophy.  Normal cavity size.  Ejection fraction 55 to 60%.  Impaired left ventricular relaxation.  Mitral valve sclerosis.  Normal left atrium.  Aortic valve and root sclerosis with trace aortic insufficiency.  Normal right heart.  Thickened pericardium.  Compared to the study 4/27/2019, systolic function has normalized.    Catheterization:     Nuclear Exercise Stress 11/13/18    1. Exercise technetium tetrofosmin was terminated at 2 minutes due to hypotension (BP dropped from 110/80 to 80/60 mmHg) and bradycardia (HR dropped from 97 to 57 bpm). Patient was markedly short of breath during exercise.  Perfusion was normal at the workload achieved but the test is nondiagnostic due  to poor exercise tolerance and inability to achieve target heart rate.    2. ECG is non-diagnostic due to not achieving the target heart rate.  3. Gated imaging reveals normal wall motion with hyperdynamic left ventricular systolic function.  4. The results were discussed with the consulting cardiology fellow.       ASSESSMENT AND PLAN  74 y.o. female, EP being consulted for being consulted for: 2:1 AV block    2:1 AV Block  - Brought on by exercise.  Associated with shortness of breath.  No history of syncope.  Patient is hemodynamically stable.  - We will plan on placing permanent pacemaker on Monday 11/18/19.   - Hold AV mata blocking agents for now.    Delmar Gallegos MD  11/15/2019  5:39 PM

## 2019-11-15 NOTE — PROGRESS NOTES
Cardiology Daily Progress Note    Subjective/Objective:  No pain.  No shortness of breath.  The patient is eagerly awaiting diagnostic catheterization    Review of systems: No headaches, no visual disturbances, all other systems reviewed and were noncontributory    Physical exam: Well-developed well-nourished female in no acute distress  Vital signs stable  Lungs clear  Cardiac regular rate tones are of fair quality  Abdomen soft no masses  Extremities unremarkable    Laboratory data: All pertinent lab studies were personally reviewed and discussed with patient    Assessment/Plan   1.  Recurrent chest pain in patient with known extensive CAD status post multiple interventions.  The risks and benefits of catheterization were again reviewed with the patient.  Catheterization will be performed today by Dr.Amid Moncada.  Further therapy will be based on results.         Expected Discharge Date:  11/15/2019       right calf ankle pain

## 2019-11-15 NOTE — PLAN OF CARE
Problem: Adult Inpatient Plan of Care  Goal: Plan of Care Review  Outcome: Progressing  Flowsheets (Taken 11/15/2019 1729)  Progress: improving  Plan of Care Reviewed With: patient  Outcome Summary: pt. will remain free from falls and report no c/o of pain during this shift.  Goal: Patient-Specific Goal (Individualization)  Outcome: Progressing  Goal: Absence of Hospital-Acquired Illness or Injury  Outcome: Progressing  Goal: Optimal Comfort and Wellbeing  Outcome: Progressing  Goal: Readiness for Transition of Care  Outcome: Progressing  Goal: Rounds/Family Conference  Outcome: Progressing     Problem: Chest Pain  Goal: Resolution of Chest Pain Symptoms  Outcome: Progressing

## 2019-11-15 NOTE — PLAN OF CARE
Problem: Adult Inpatient Plan of Care  Goal: Plan of Care Review  Outcome: Progressing  Flowsheets  Taken 11/14/2019 0435 by Jackie Panchal, RN  Progress: improving  Taken 11/15/2019 0309 by Jodi Wilson RN  Plan of Care Reviewed With: patient  Outcome Summary: Pt sleeping, npo for cardiac cath in the am, call light within reach.  Goal: Patient-Specific Goal (Individualization)  Outcome: Progressing  Goal: Absence of Hospital-Acquired Illness or Injury  Outcome: Progressing  Goal: Optimal Comfort and Wellbeing  Outcome: Progressing  Goal: Readiness for Transition of Care  Outcome: Progressing

## 2019-11-15 NOTE — PROGRESS NOTES
Hospital Medicine Service -  Daily Progress Note       SUBJECTIVE   Interval History: Patient denies CP or SOB. Cath today. 2:1 Av block during cath. Awaiting EP consult.      OBJECTIVE      Vital signs in last 24 hours:  Temp:  [36.3 °C (97.4 °F)-36.7 °C (98 °F)] 36.6 °C (97.8 °F)  Heart Rate:  [45-75] 75  Resp:  [18-23] 18  BP: (116-158)/() 132/72    Intake/Output Summary (Last 24 hours) at 11/15/2019 1749  Last data filed at 11/15/2019 1538  Gross per 24 hour   Intake --   Output 5 ml   Net -5 ml       PHYSICAL EXAMINATION      Physical Exam   Constitutional: She is oriented to person, place, and time. She appears well-nourished. No distress.   HENT:   Head: Normocephalic and atraumatic.   Mouth/Throat: Oropharynx is clear and moist.   Eyes: Pupils are equal, round, and reactive to light. Conjunctivae and EOM are normal.   Neck: Normal range of motion. Neck supple.   Cardiovascular: Normal rate, regular rhythm and normal heart sounds.   Pulmonary/Chest: Effort normal and breath sounds normal. No respiratory distress.   Abdominal: Soft. Bowel sounds are normal.   Musculoskeletal: Normal range of motion. She exhibits no edema.   Neurological: She is alert and oriented to person, place, and time.   Skin: Skin is warm and dry.   Psychiatric: Her behavior is normal. Judgment and thought content normal.   Nursing note and vitals reviewed.     LABS / IMAGING / TELE      Labs  Results from last 7 days   Lab Units 11/15/19  0847   SODIUM mEQ/L 140   POTASSIUM mEQ/L 4.0   CHLORIDE mEQ/L 108   CO2 mEQ/L 22   BUN mg/dL 14   CREATININE mg/dL 1.0   GLUCOSE mg/dL 106*   CALCIUM mg/dL 9.3     Results from last 7 days   Lab Units 11/12/19  2246   WBC K/uL 6.19   HEMOGLOBIN g/dL 14.0   HEMATOCRIT % 42.1   PLATELETS K/uL 212         Imaging  I have independently reviewed the pertinent imaging from the last 24 hrs.    ECG/Telemetry  I have independently reviewed the telemetry. No events for the last 24 hours.    ASSESSMENT  AND PLAN      * Chest pain  Assessment & Plan  - trop negative x 2   - cards consulted, recs apprec   - BNP 57   - no events noted on telemetry monitoring   - cont DAPT, statin  - exercise stress test ordered, although inconclusive, as patient became hypotensive during testing   - cardiac catheterization completed today which showed patent stents and 50% ostial LCx stenosis  - patient went into 2:1 AV block   - EP consulted     Coronary artery disease involving native coronary artery of native heart with angina pectoris (CMS/[facility])  Assessment & Plan  - c/b NSTEMI 4/26/19, three-vessel CAD; status post FRANK x 2 LAD 4/27/19 + FRANK x 4 RCA 4/29/19 + FRANK x 1 LCX OM2 5/2/19 with resolved ischemic cardiomyopathy  - cont DAPT, statin  - cards consulted as patient is known to Dr. Courtney, recs apprec  - cardiac cath shows patent stents and 50% ostial LCx stenosis  - continue outpatient cardiology follow up    Elevated blood pressure reading without diagnosis of hypertension  Assessment & Plan  - BP has remained stable     GERD (gastroesophageal reflux disease)  Assessment & Plan  - cont PPI    Hypercholesterolemia  Assessment & Plan  - cont statin         VTE Assessment: Padua VTE Score: 1  VTE Prophylaxis Plan: ambulation  Code Status: Full Code  Estimated Discharge Date: 11/15/2019  Disposition Planning: home pending EP consult     ARIAN Melvin  11/15/2019

## 2019-11-15 NOTE — PLAN OF CARE
Problem: Adult Inpatient Plan of Care  Goal: Plan of Care Review  Outcome: Progressing  Flowsheets (Taken 11/15/2019 0950)  Progress: improving   Per medical rounds, pt for heart cath today.  Likely to d/c after procedure.  No d/c needs identified at this time.

## 2019-11-15 NOTE — DISCHARGE SUMMARY
Hospital Medicine Service -  Inpatient Discharge Summary        BRIEF OVERVIEW   Admitting Provider: Juli Thrasher MD  Attending Provider: Darrel Pisano MD Attending phys phone: [phone]    PCP: Anitra Way [phone]    Admission Date: 11/12/2019  Discharge Date: 11/19/2019     DISCHARGE DIAGNOSES      Primary Discharge Diagnosis  Chest pain    Secondary Discharge Diagnoses  Active Hospital Problems    Diagnosis Date Noted   • Chest pain 11/12/2019     Priority: High   • Mobitz type 2 second degree atrioventricular block 11/16/2019   • Coronary artery disease involving native coronary artery of native heart with angina pectoris (CMS/[facility]) 11/13/2019   • AV block 11/12/2019   • Hypercholesterolemia 04/19/2019   • GERD (gastroesophageal reflux disease) 04/19/2019   • Elevated blood pressure reading without diagnosis of hypertension 04/19/2019      Resolved Hospital Problems   No resolved problems to display.       Problem List on Day of Discharge  * Chest pain  Assessment & Plan  - trop negative x 2   - bnp 57; cxr nad   - continue dapt, statin  - exercise stress test terminated due to hypotension, bradycardia and shortness of breath; perfusion was normal at the workload achieved but the test is nondiagnostic due to poor exercise tolerance and inability to achieve target heart rate  - s/p cath with patent stents in the proximal and mid RCA, mid and distal LAD and proximal LCx into large OM1; 50% ostial LCx stenosis, iFR negative (1.0); normal left cardiac filling pressure, LVEDP 1 mmHg  - patient went into 2:1 AV block during cath 11/16  - ep c/s appreciated   - cards c/s appreciated   - PPM placed 11/18  - patient will need to follow up with EP in one week after discharge       Mobitz type 2 second degree atrioventricular block  Assessment & Plan  - PPM placed 11/18  - avoid av mata agents   - ep c/s appreciated  - follow up with EP in one week after discharge     Coronary artery disease  "involving native coronary artery of native heart with angina pectoris (CMS/[facility])  Assessment & Plan  - STEMI 4/26/19, three-vessel CAD; status post FRANK x 2 LAD 4/27/19 + FRANK x 4 RCA 4/29/19 + FRANK x 1 LCX OM2 5/2/19 with resolved ischemic cardiomyopathy  - s/p cath with patent stents in the proximal and mid RCA, mid and distal LAD and proximal LCx into large OM1; 50% ostial LCx stenosis, iFR negative (1.0); normal left cardiac filling pressure, LVEDP 1 mmHg  - continue dapt, statin  - cards c/s appreciated     Elevated blood pressure reading without diagnosis of hypertension  Assessment & Plan  - has remained stable       GERD (gastroesophageal reflux disease)  Assessment & Plan  - continue ppi     Hypercholesterolemia  Assessment & Plan  - continue statin   - chol 159, tg 71, hdl 66, ldl 79    SUMMARY OF HOSPITALIZATION      Presenting Problem/History of Present Illness  Chest pain, unspecified type    This is a 74 y.o. year-old female admitted on 11/12/2019 with Chest pain [R07.9]  Chest pain, unspecified type [R07.9]  Chest pain, unspecified type [R07.9].    Jennifer Sams is a 74 year old female with a history of triple vessel CAD c/b NSTEMI 4/26/19 s/p FRANK x 2 LAD 4/27/19 + FRANK x 4 RCA 4/29/19 + FRANK x 1 LCX OM2 5/2/19, hypercholesterolemia with a high HDL cholesterol, resolved ischemic cardiomyopathy, hiatal hernia c/b GERD, and osteoarthritis of her hips who presented with chest pressure. She was running errands, and at her second stop, while grocery shopping, she developed substernal chest pressure, nonradiated, without dyspnea or nausea but with diaphoresis. She drove a few blocks home and lay down and took a nitroglycerine - this did not help the pain, but when she stood up, she was severely lightheaded. She lay back down and the feeling passed after a few minutes but she was \"spooked\" and very anxious so presented to the ED.    Hospital Course    # Chest pain  Troponin negative x 2. EKG unchanged from " prior. No significant events noted on telemetry. The patient was evaluated by cardiology. A exercise stress test was attempted on 11/13 but inconclusive as patient had a drop in her BP to 80/60 a few minutes into the test. She underwent cardiac catheterization on 11/15 which showed patent stents in the proximal and mid RCA, mid and distal LAD and OM1 and 50% ostial LCx stenosis.     # Type 2 Mobitz AV block  She had a 2:1 AV block during the catheterization. She was noted to have this during ambulation as well and this apeared to be causing some of her symptoms. EP consulted and a PPM was placed on 11/18 without complication.     The patient denied any further chest pain or pressure. She has remained hemodynamically stable. She will continue to follow up with cardiology as outpatient. She will see EP in one week post discharge for wound check.     Exam on Day of Discharge  Physical Exam   Constitutional: She is oriented to person, place, and time. She appears well-developed and well-nourished.   HENT:   Head: Normocephalic and atraumatic.   Mouth/Throat: Oropharynx is clear and moist.   Eyes: Pupils are equal, round, and reactive to light. Conjunctivae and EOM are normal.   Neck: Normal range of motion. Neck supple.   Cardiovascular: Normal rate, regular rhythm and normal heart sounds.   Pulmonary/Chest: Effort normal and breath sounds normal. No respiratory distress.   Abdominal: Soft. Bowel sounds are normal. She exhibits no distension.   Musculoskeletal: Normal range of motion. She exhibits no edema.   Neurological: She is alert and oriented to person, place, and time.   Skin: Skin is warm and dry.   Psychiatric: Her behavior is normal. Judgment and thought content normal.   Nursing note and vitals reviewed.      Consults During Admission  IP CONSULT TO CARDIOLOGY  IP CONSULT TO ELECTROPHYSIOLOGY  IP CONSULT TO CARDIOLOGY    DISCHARGE MEDICATIONS     PENDING ORDERS (720h ago, onward)         Ordered      meperidine (DEMEROL) injection 12.5 mg  Every 10 min PRN,   Discontinued:  --,   Status:  Canceled      Signed and Held     fentaNYL (PF) (SUBLIMAZE) injection 50 mcg  Every 15 min PRN,   Discontinued:  --,   Status:  Canceled      Signed and Held               Medication List      CONTINUE taking these medications    aspirin 81 mg enteric coated tablet  Commonly known as:  ASPIR-81  Take 1 tablet (81 mg total) by mouth daily.  Dose:  81 mg     chlorpheniramine 4 mg tablet  Commonly known as:  CHLOR-TRIMETON  Take 4 mg by mouth every 6 (six) hours as needed for allergies.  Dose:  4 mg     nitroglycerin 0.4 mg SL tablet  Commonly known as:  NITROSTAT  Place 1 tablet (0.4 mg total) under the tongue every 5 (five) minutes as needed for chest pain.  Dose:  0.4 mg     pantoprazole 40 mg EC tablet  Commonly known as:  PROTONIX  Take 40 mg by mouth daily.  Dose:  40 mg     prasugrel 10 mg tablet  Commonly known as:  EFFIENT  Take 1 tablet (10 mg total) by mouth daily.  Dose:  10 mg     rosuvastatin 20 mg tablet  Commonly known as:  CRESTOR  Take 1 tablet (20 mg total) by mouth daily.  Dose:  20 mg            Instructions for after discharge     Follow-up with primary physician (PCP)      Within one week of discharge    Follow-up with provider      Teena Hernández MD   [phone]    [address] [facility] [address]  [place] [id number]                PROCEDURES / LABS / IMAGING      Operative Procedures  none    Other Procedures  Exercise stress inconclusive 11/13    Cardiac catheterization 11/15: patent stents and 50% ostial LCx stenosis    Pertinent Labs  Results from last 7 days   Lab Units 11/19/19  0404 11/18/19  0531 11/16/19  1843   SODIUM mEQ/L 139 143 139   POTASSIUM mEQ/L 4.6 4.4 3.8   CHLORIDE mEQ/L 111* 112* 107   CO2 mEQ/L 21* 22 22   BUN mg/dL 12 12 11   CREATININE mg/dL 0.9 0.8 1.0   GLUCOSE mg/dL 99 101* 147*   CALCIUM mg/dL 8.5* 9.3 9.6     Results from last 7 days   Lab Units  11/19/19  0404 11/18/19  0531 11/16/19  1843   WBC K/uL 6.23 5.88 7.24   HEMOGLOBIN g/dL 11.7* 13.0 15.3   HEMATOCRIT % 35.8 39.2 46.3*   PLATELETS K/uL 166 194 256     Results from last 7 days   Lab Units 11/13/19  0452 11/12/19  2246   TROPONIN I ng/mL <0.03 <0.03         Pertinent Imaging  X-ray Chest 2 Views    Result Date: 11/13/2019  IMPRESSION: No active disease.     X-ray Chest 1 View    Result Date: 11/18/2019  IMPRESSION: Left chest wall pacemaker.  No pneumothorax.      OUTPATIENT  FOLLOW-UP / REFERRALS / PENDING TESTS        Outpatient Follow-Up Appointments            In 1 month Tomasz Courtney MD [facility] General Cardiology at [facility]          Referrals  No orders of the defined types were placed in this encounter.      Test Results Pending at Discharge  Unresulted Labs (From admission, onward)    None          Important Issues to Address in Follow-Up  Outpatient cardiology follow up     DISCHARGE DISPOSITION      Disposition: Home     Code Status At Discharge: Full Code    Physician Order for Life-Sustaining Treatment Document Status      No documents found

## 2019-11-15 NOTE — PRE-PROCEDURE NOTE
Cardiac Cath Lab Pre-procedure Note    - Patient was seen and examined at bedside.  - The patient's chart and all data was reviewed.  - The procedure, treatment alternatives, risks and benefits were explained with specific risks discussed.  - Patient was consented for cardiac cath procedure and possible PCI.  - Patient's case was found appropriate for dual antiplatelet therapy.    Patient's clinical presentation to the cardiac cath lab: unstable angina.     Patient is at risk for stroke due to extensive atherosclerotic vascular disease; acute myocardial infarction; vascular complications due to the presence of severe peripheral arterial disease in the iliac/femoral vessels increasing the risk of hematoma, retroperitoneal bleeding, pseudo-aneurysms and arteriovenous fistula formation; bleeding; renal failure potentially requiring dialysis due to the presence of pre-procedural abnormal renal function; allergic reaction including anaphylaxis due to known history of allergy to contrast agents containing iodine and emergency cardiac surgery.   Patient appears to be managing well.         Total anti-anginal medications: none.         Pre-sedation assessment  ASA 2   Mallampati class: III - soft palate, base of uvula visible.

## 2019-11-16 PROBLEM — I44.1 MOBITZ TYPE 2 SECOND DEGREE ATRIOVENTRICULAR BLOCK: Status: ACTIVE | Noted: 2019-11-16

## 2019-11-16 LAB
ALBUMIN SERPL-MCNC: 4.4 G/DL (ref 3.4–5)
ALP SERPL-CCNC: 80 IU/L (ref 35–126)
ALT SERPL-CCNC: 18 IU/L (ref 11–54)
ANION GAP SERPL CALC-SCNC: 10 MEQ/L (ref 3–15)
AST SERPL-CCNC: 25 IU/L (ref 15–41)
BILIRUB SERPL-MCNC: 0.8 MG/DL (ref 0.3–1.2)
BUN SERPL-MCNC: 11 MG/DL (ref 8–20)
CALCIUM SERPL-MCNC: 9.6 MG/DL (ref 8.9–10.3)
CHLORIDE SERPL-SCNC: 107 MEQ/L (ref 98–109)
CO2 SERPL-SCNC: 22 MEQ/L (ref 22–32)
CREAT SERPL-MCNC: 1 MG/DL
ERYTHROCYTE [DISTWIDTH] IN BLOOD BY AUTOMATED COUNT: 13.7 % (ref 11.7–14.4)
GFR SERPL CREATININE-BSD FRML MDRD: 54.2 ML/MIN/1.73M*2
GLUCOSE SERPL-MCNC: 147 MG/DL (ref 70–99)
HCT VFR BLDCO AUTO: 46.3 %
HGB BLD-MCNC: 15.3 G/DL (ref 11.8–15.7)
MAGNESIUM SERPL-MCNC: 2.2 MG/DL (ref 1.8–2.5)
MCH RBC QN AUTO: 29.9 PG (ref 28–33.2)
MCHC RBC AUTO-ENTMCNC: 33 G/DL (ref 32.2–35.5)
MCV RBC AUTO: 90.4 FL (ref 83–98)
PDW BLD AUTO: 8.9 FL (ref 9.4–12.3)
PLATELET # BLD AUTO: 256 K/UL
POTASSIUM SERPL-SCNC: 3.8 MEQ/L (ref 3.6–5.1)
PROT SERPL-MCNC: 7.3 G/DL (ref 6–8.2)
RBC # BLD AUTO: 5.12 M/UL (ref 3.93–5.22)
SODIUM SERPL-SCNC: 139 MEQ/L (ref 136–144)
TSH SERPL DL<=0.05 MIU/L-ACNC: 2.75 MIU/L (ref 0.34–5.6)
WBC # BLD AUTO: 7.24 K/UL

## 2019-11-16 PROCEDURE — 63700000 HC SELF-ADMINISTRABLE DRUG: Performed by: HOSPITALIST

## 2019-11-16 PROCEDURE — 80053 COMPREHEN METABOLIC PANEL: CPT | Performed by: HOSPITALIST

## 2019-11-16 PROCEDURE — 99233 SBSQ HOSP IP/OBS HIGH 50: CPT | Performed by: HOSPITALIST

## 2019-11-16 PROCEDURE — 99233 SBSQ HOSP IP/OBS HIGH 50: CPT | Performed by: INTERNAL MEDICINE

## 2019-11-16 PROCEDURE — 99232 SBSQ HOSP IP/OBS MODERATE 35: CPT | Performed by: INTERNAL MEDICINE

## 2019-11-16 PROCEDURE — 21400000 HC ROOM AND CARE CCU/INTERMEDIATE

## 2019-11-16 PROCEDURE — 83735 ASSAY OF MAGNESIUM: CPT | Performed by: HOSPITALIST

## 2019-11-16 PROCEDURE — 85027 COMPLETE CBC AUTOMATED: CPT | Performed by: HOSPITALIST

## 2019-11-16 PROCEDURE — 36415 COLL VENOUS BLD VENIPUNCTURE: CPT | Performed by: HOSPITALIST

## 2019-11-16 RX ADMIN — ROSUVASTATIN CALCIUM 20 MG: 20 TABLET, FILM COATED ORAL at 08:51

## 2019-11-16 RX ADMIN — PANTOPRAZOLE SODIUM 40 MG: 40 TABLET, DELAYED RELEASE ORAL at 08:51

## 2019-11-16 RX ADMIN — PRASUGREL 10 MG: 10 TABLET, FILM COATED ORAL at 08:50

## 2019-11-16 RX ADMIN — ASPIRIN 81 MG: 81 TABLET, COATED ORAL at 08:51

## 2019-11-16 NOTE — PROGRESS NOTES
"Cardiology Daily Progress Note    Subjective/Objective:  Feels some vague awareness in the left side of her chest.  The patient is concerned this may be related to her \"nerves\"    Review of systems: 12 point review of systems was noncontributory    Physical exam: Elderly female in no acute distress  Left radial cath site clean and dry  Vital signs stable  Cardiac regular rate abdomen bowel sounds present    Extremities unremarkable    Cardiac cath performed yesterday showed no significant in-stent issues.  Patient was noted to have 2-1 block and will require permanent pacemaker  2-1 AV block which is intermittent.  Assessment/Plan    1.  High degree block.  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  Patient was advised to remain in the hospital until dual-chamber pacer implantation could be performed.  The risks and benefits were discussed in detail with the patient    2.  Coronary artery disease.  Repeat catheterization yesterday showed no critical lesions.    3.  Dyslipidemia.  Continue present medical regimen.    Patient is aware she needs to remain in the hospital until pacer implantation         Expected Discharge Date:  11/15/2019      "

## 2019-11-16 NOTE — ASSESSMENT & PLAN NOTE
- PPM placed 11/18  - avoid av mata agents   - ep c/s appreciated  - follow up with EP in one week after discharge

## 2019-11-16 NOTE — PROGRESS NOTES
Electrophysiology -  Daily Progress Note         SUBJECTIVE  Symptomatic 2:1 AV block with ambulation, otherwise feels well, no significant pauses or higher garde AV block.    Inpatient medications:  •  acetaminophen, 650 mg, oral, q4h PRN  •  alum-mag hydroxide-simeth, 30 mL, oral, q4h PRN  •  aspirin, 81 mg, oral, Daily  •  glucose, 16-32 g of dextrose, oral, PRN **OR** dextrose, 15-30 g of dextrose, oral, PRN **OR** glucagon, 1 mg, intramuscular, PRN **OR** dextrose in water, 25 mL, intravenous, PRN  •  ondansetron, 4 mg, intravenous, q6h PRN  •  pantoprazole, 40 mg, oral, Daily  •  potassium chloride, 20 mEq, intravenous, PRN  •  potassium chloride, 40 mEq, intravenous, PRN  •  prasugrel, 10 mg, oral, Daily  •  rosuvastatin, 20 mg, oral, Daily  •  senna, 1 tablet, oral, 2x daily PRN  •  sodium chloride, 2 spray, Each Nostril, q2h PRN    OBJECTIVE  Vital signs in last 24 hours:  Temp:  [36.6 °C (97.8 °F)-37.1 °C (98.7 °F)] 37.1 °C (98.7 °F)  Heart Rate:  [45-82] 71  Resp:  [16-23] 16  BP: (128-136)/() 128/68    PHYSICAL EXAM    Physical Exam   Constitutional: She is oriented to person, place, and time. She appears well-developed and well-nourished.   HENT:   Head: Normocephalic and atraumatic.   Neck: No JVD present.   Cardiovascular: Normal rate and regular rhythm.   No murmur heard.  Pulmonary/Chest: Breath sounds normal.   Abdominal: Soft. Bowel sounds are normal. There is no tenderness.   Musculoskeletal: She exhibits no edema.   Neurological: She is alert and oriented to person, place, and time.   Skin: Skin is warm and dry.   Psychiatric: She has a normal mood and affect.       Laboratory results:  Results from last 7 days   Lab Units 11/15/19  0847 11/12/19  2246   SODIUM mEQ/L 140 140   POTASSIUM mEQ/L 4.0 3.8   CHLORIDE mEQ/L 108 107   CO2 mEQ/L 22 24   BUN mg/dL 14 14   CREATININE mg/dL 1.0 0.9   CALCIUM mg/dL 9.3 9.3   ALBUMIN g/dL  --  3.9   BILIRUBIN TOTAL mg/dL  --  0.7   ALK PHOS IU/L  --   68   ALT IU/L  --  21   AST IU/L  --  31   GLUCOSE mg/dL 106* 107*     Results from last 7 days   Lab Units 11/12/19  2246   WBC K/uL 6.19   HEMOGLOBIN g/dL 14.0   HEMATOCRIT % 42.1   PLATELETS K/uL 212               Imaging  Cardiac Imaging   TRANSTHORACIC ECHO (TTE) COMPLETE 06/11/2019    Narrative Technically very difficult study.  Mild concentric left ventricular hypertrophy.  Normal cavity size.    Ejection fraction 55 to 60%.  Impaired left ventricular relaxation.  Mitral valve sclerosis.  Normal left atrium.  Aortic valve and root sclerosis with trace aortic insufficiency.  Normal right heart.  Thickened pericardium.  Compared to the study 4/27/2019, systolic function has normalized.       ECG/Telemetry  Sinus with paroxysmal 2:1 AV block    ASSESSMENT AND PLAN  Mobitz type 2 second degree atrioventricular block  Assessment & Plan  Symptomatic 2:1 AV block consistent with Mobitz 2 block.     Plan pacer 11/18. Discussed procedure and risks. Higher risk of bleeding complications with dual antiplatelet therapy, but risk of stopping given extensive CAD and stents warrants continuation.                 Jermaine Carolina MD  11/16/2019  10:46 AM

## 2019-11-16 NOTE — PLAN OF CARE
Problem: Adult Inpatient Plan of Care  Goal: Plan of Care Review  Outcome: Progressing  Flowsheets (Taken 11/16/2019 0509)  Progress: improving  Plan of Care Reviewed With: patient  Outcome Summary: Patient experienced epistaxis for multiple hours before resolving at 0000. Patient ordered saline nose spray to help with dryness, kept at patient bedside. Removed radial band slowly to aovid excessive bleeding due to history. Patient did not experience any complications after removal. Patient slept comfortably throughout shift. Ambulated independently in hallway.

## 2019-11-16 NOTE — ASSESSMENT & PLAN NOTE
Symptomatic 2:1 AV block consistent with Mobitz 2 block.     Plan pacer 11/18. Discussed procedure and risks. Higher risk of bleeding complications with dual antiplatelet therapy, but risk of stopping given extensive CAD and stents warrants continuation.

## 2019-11-16 NOTE — PLAN OF CARE
Problem: Adult Inpatient Plan of Care  Goal: Plan of Care Review  Outcome: Progressing  Flowsheets (Taken 11/16/2019 1216)  Progress: improving  Plan of Care Reviewed With: patient  Outcome Summary: pt denies pains at this time.     Problem: Adult Inpatient Plan of Care  Goal: Patient-Specific Goal (Individualization)  Outcome: Progressing  Flowsheets (Taken 11/16/2019 1216)  Patient-Specific Goals (Include Timeframe): pt will have less anxiety by end of shift  Individualized Care Needs:  will provide emotional support to pt  Anxieties, Fears or Concerns: pt anxious waiting for pacemaker monday     Problem: Adult Inpatient Plan of Care  Goal: Absence of Hospital-Acquired Illness or Injury  Outcome: Progressing     Problem: Adult Inpatient Plan of Care  Goal: Optimal Comfort and Wellbeing  Outcome: Progressing

## 2019-11-16 NOTE — PROGRESS NOTES
.   Hospital Medicine Service -  Daily Progress Note       SUBJECTIVE   Interval History: pt seen and examined at 0935 on 11/16/19; pt currently offers no c/o - denies cp, sob, abd pain, n/v, palpitations; awaiting ppm on Monday      OBJECTIVE      Vital signs in last 24 hours:  Temp:  [36.7 °C (98 °F)-37.1 °C (98.7 °F)] 37 °C (98.6 °F)  Heart Rate:  [67-82] 72  Resp:  [16-18] 18  BP: (128-132)/(68-76) 132/76  No intake or output data in the 24 hours ending 11/16/19 1717    PHYSICAL EXAMINATION      Physical Exam   Constitutional: She is oriented to person, place, and time. She appears well-developed and well-nourished.   HENT:   Head: Normocephalic and atraumatic.   Eyes: Pupils are equal, round, and reactive to light. EOM are normal.   Neck: Normal range of motion. Neck supple.   Cardiovascular: Normal rate, regular rhythm and normal heart sounds.   Pulmonary/Chest: Effort normal and breath sounds normal. No respiratory distress. She has no wheezes. She has no rales.   Abdominal: Soft. Bowel sounds are normal. She exhibits no distension. There is no tenderness. There is no guarding.   Musculoskeletal: She exhibits no edema or deformity.   Neurological: She is alert and oriented to person, place, and time.   Skin: Skin is warm and dry. No rash noted.   Psychiatric: She has a normal mood and affect.      LINES, CATHETERS, DRAINS, AIRWAYS, AND WOUNDS   Lines, Drains, Airways, Wounds:  Peripheral IV 11/15/19 Right Antecubital (Active)   Number of days: 1       Catheterization Site - Arterial Left Radial 6 Fr. (Active)   Number of days: 1       Comments:      LABS / IMAGING / TELE      Labs  .  Results from last 7 days   Lab Units 11/12/19  2246   WBC K/uL 6.19   HEMOGLOBIN g/dL 14.0   HEMATOCRIT % 42.1   PLATELETS K/uL 212     .  Results from last 7 days   Lab Units 11/15/19  0847 11/12/19  2246   SODIUM mEQ/L 140 140   POTASSIUM mEQ/L 4.0 3.8   CHLORIDE mEQ/L 108 107   CO2 mEQ/L 22 24   BUN mg/dL 14 14   CREATININE  mg/dL 1.0 0.9   CALCIUM mg/dL 9.3 9.3   ALBUMIN g/dL  --  3.9   BILIRUBIN TOTAL mg/dL  --  0.7   ALK PHOS IU/L  --  68   ALT IU/L  --  21   AST IU/L  --  31   GLUCOSE mg/dL 106* 107*       Imaging  I have independently reviewed the pertinent imaging from the last 24 hrs.    ECG/Telemetry  I have independently reviewed the telemetry. Significant findings include 2:1 avb.    ASSESSMENT AND PLAN      Mobitz type 2 second degree atrioventricular block  Assessment & Plan  - current plan is for ppm placement on Monday   - continue to monitor on tele   - avoid av mata agents   - ep c/s appreciated    * Chest pain  Overview  ECHO 6/19:  Mild concentric left ventricular hypertrophy.  Normal cavity size.  Ejection fraction 55 to 60%.  Impaired left ventricular relaxation.  Mitral valve sclerosis. Normal left atrium. Aortic valve and root sclerosis with trace aortic insufficiency. Normal right heart. Thickened pericardium.      Assessment & Plan  - trop negative x 2   - bnp 57; cxr nad   - continue dapt, statin  - exercise stress test terminated due to hypotension, bradycardia and shortness of breath; perfusion was normal at the workload achieved but the test is nondiagnostic due to poor exercise tolerance and inability to achieve target heart rate  - s/p cath with patent stents in the proximal and mid RCA, mid and distal LAD and proximal LCx into large OM1; 50% ostial LCx stenosis, iFR negative (1.0); normal left cardiac filling pressure, LVEDP 1 mmHg  - patient went into 2:1 AV block for which she will need a ppm   - EP c/s appreciated   - cards c/s appreciated     Coronary artery disease involving native coronary artery of native heart with angina pectoris (CMS/[facility])  Assessment & Plan  - STEMI 4/26/19, three-vessel CAD; status post FRANK x 2 LAD 4/27/19 + FRANK x 4 RCA 4/29/19 + FRANK x 1 LCX OM2 5/2/19 with resolved ischemic cardiomyopathy  - continue dapt, statin  - s/p cath with patent stents in the proximal and mid RCA, mid  and distal LAD and proximal LCx into large OM1; 50% ostial LCx stenosis, iFR negative (1.0); normal left cardiac filling pressure, LVEDP 1 mmHg  - cards c/s appreciated     Elevated blood pressure reading without diagnosis of hypertension  Assessment & Plan  - continue to monitor and manage expectantly     Hypercholesterolemia  Assessment & Plan  - continue statin   - chol 159, tg 71, hdl 66, ldl 79    GERD (gastroesophageal reflux disease)  Assessment & Plan  - continue ppi        VTE Assessment: [place] VTE Score: 1  VTE Prophylaxis Plan: ambulation, refusing dvt ppx   Code Status: Full Code  Estimated Discharge Date: 11/19/2019     Ghislaine Andujar,   11/16/2019

## 2019-11-17 PROCEDURE — 21400000 HC ROOM AND CARE CCU/INTERMEDIATE

## 2019-11-17 PROCEDURE — 99233 SBSQ HOSP IP/OBS HIGH 50: CPT | Performed by: HOSPITALIST

## 2019-11-17 PROCEDURE — 99232 SBSQ HOSP IP/OBS MODERATE 35: CPT | Performed by: INTERNAL MEDICINE

## 2019-11-17 PROCEDURE — 63700000 HC SELF-ADMINISTRABLE DRUG: Performed by: HOSPITALIST

## 2019-11-17 RX ADMIN — PANTOPRAZOLE SODIUM 40 MG: 40 TABLET, DELAYED RELEASE ORAL at 10:03

## 2019-11-17 RX ADMIN — ASPIRIN 81 MG: 81 TABLET, COATED ORAL at 10:03

## 2019-11-17 RX ADMIN — PRASUGREL 10 MG: 10 TABLET, FILM COATED ORAL at 10:03

## 2019-11-17 RX ADMIN — ROSUVASTATIN CALCIUM 20 MG: 20 TABLET, FILM COATED ORAL at 10:03

## 2019-11-17 NOTE — PROGRESS NOTES
Cardiology Daily Progress Note    Subjective/Objective:  No angina.  Rhythm has been stable    Review of systems: No headaches no all other systems reviewed and were noncontributory    Physical exam: Elderly female in no acute distress  Vital signs stable  Lungs clear  Cardiac regular rate  Abdomen soft  Extremities unremarkable  Left radial cath site clean and dry  Neuro no focal deficits    Laboratory data.  All pertinent lab studies personally reviewed.    Assessment/Plan   * Chest pain  Overview  ECHO 6/19:  Mild concentric left ventricular hypertrophy.  Normal cavity size.  Ejection fraction 55 to 60%.  Impaired left ventricular relaxation.  Mitral valve sclerosis. Normal left atrium. Aortic valve and root sclerosis with trace aortic insufficiency. Normal right heart. Thickened pericardium.      High degree heart block.  For permanent pacer implantation tomorrow.  Keep n.p.o. except meds after midnight    3.  Coronary artery disease.  Cardiac catheterization November 15 showed no new obstructions or progression of disease    4 dyslipidemia.  Continue present statin regimen.           Expected Discharge Date:  11/19/2019

## 2019-11-17 NOTE — PROGRESS NOTES
.   Hospital Medicine Service -  Daily Progress Note       SUBJECTIVE   Interval History: pt seen and examined at 0915 on 11/17/19; pt resting comfortably in bed, offers no specific c/o this am      OBJECTIVE      Vital signs in last 24 hours:  Temp:  [36.6 °C (97.8 °F)-36.7 °C (98.1 °F)] 36.7 °C (98.1 °F)  Heart Rate:  [72-82] 82  Resp:  [16-18] 18  BP: (113-137)/(58-82) 132/82  No intake or output data in the 24 hours ending 11/17/19 1639    PHYSICAL EXAMINATION      Physical Exam   Constitutional: She is oriented to person, place, and time. She appears well-developed and well-nourished.   HENT:   Head: Normocephalic and atraumatic.   Eyes: Pupils are equal, round, and reactive to light. EOM are normal.   Neck: Normal range of motion. Neck supple.   Cardiovascular: Normal rate, regular rhythm and normal heart sounds.   Pulmonary/Chest: Effort normal and breath sounds normal. No respiratory distress. She has no wheezes. She has no rales.   Abdominal: Soft. Bowel sounds are normal. She exhibits no distension. There is no tenderness. There is no guarding.   Musculoskeletal: She exhibits no edema or deformity.   Neurological: She is alert and oriented to person, place, and time.   Skin: Skin is warm and dry. No rash noted.   Psychiatric: She has a normal mood and affect.    LINES, CATHETERS, DRAINS, AIRWAYS, AND WOUNDS   Lines, Drains, Airways, Wounds:  Peripheral IV 11/15/19 Right Antecubital (Active)   Number of days: 2       Catheterization Site - Arterial Left Radial 6 Fr. (Active)   Number of days: 2       Comments:      LABS / IMAGING / TELE      Labs  .  Results from last 7 days   Lab Units 11/16/19  1843 11/12/19  2246   WBC K/uL 7.24 6.19   HEMOGLOBIN g/dL 15.3 14.0   HEMATOCRIT % 46.3* 42.1   PLATELETS K/uL 256 212     .  Results from last 7 days   Lab Units 11/16/19  1843 11/15/19  0847 11/12/19  2246   SODIUM mEQ/L 139 140 140   POTASSIUM mEQ/L 3.8 4.0 3.8   CHLORIDE mEQ/L 107 108 107   CO2 mEQ/L 22 22 24    BUN mg/dL 11 14 14   CREATININE mg/dL 1.0 1.0 0.9   CALCIUM mg/dL 9.6 9.3 9.3   ALBUMIN g/dL 4.4  --  3.9   BILIRUBIN TOTAL mg/dL 0.8  --  0.7   ALK PHOS IU/L 80  --  68   ALT IU/L 18  --  21   AST IU/L 25  --  31   GLUCOSE mg/dL 147* 106* 107*         Imaging  I have independently reviewed the pertinent imaging from the last 24 hrs.    ECG/Telemetry  I have independently reviewed the telemetry. Significant findings include 2:1 avb.    ASSESSMENT AND PLAN      Mobitz type 2 second degree atrioventricular block  Assessment & Plan  - current plan is for ppm placement on Monday   - continue to monitor on tele   - avoid av mata agents   - ep c/s appreciated    * Chest pain  Assessment & Plan  - trop negative x 2   - bnp 57; cxr nad   - continue dapt, statin  - exercise stress test terminated due to hypotension, bradycardia and shortness of breath; perfusion was normal at the workload achieved but the test is nondiagnostic due to poor exercise tolerance and inability to achieve target heart rate  - s/p cath with patent stents in the proximal and mid RCA, mid and distal LAD and proximal LCx into large OM1; 50% ostial LCx stenosis, iFR negative (1.0); normal left cardiac filling pressure, LVEDP 1 mmHg  - patient went into 2:1 AV block for which she will need a ppm   - ep c/s appreciated   - cards c/s appreciated     Coronary artery disease involving native coronary artery of native heart with angina pectoris (CMS/HCC)  Assessment & Plan  - STEMI 4/26/19, three-vessel CAD; status post FRANK x 2 LAD 4/27/19 + FRANK x 4 RCA 4/29/19 + FRANK x 1 LCX OM2 5/2/19 with resolved ischemic cardiomyopathy  - s/p cath with patent stents in the proximal and mid RCA, mid and distal LAD and proximal LCx into large OM1; 50% ostial LCx stenosis, iFR negative (1.0); normal left cardiac filling pressure, LVEDP 1 mmHg  - continue dapt, statin  - cards c/s appreciated     Elevated blood pressure reading without diagnosis of hypertension  Assessment  & Plan  - continue to monitor and manage expectantly     Hypercholesterolemia  Assessment & Plan  - continue statin   - chol 159, tg 71, hdl 66, ldl 79    GERD (gastroesophageal reflux disease)  Assessment & Plan  - continue ppi        VTE Assessment: Padua VTE Score: 1  VTE Prophylaxis Plan: ambulation; refusing chemical ppx   Code Status: Full Code  Estimated Discharge Date: 11/19/2019     Ghislaine Andujar, DO  11/17/2019

## 2019-11-17 NOTE — PLAN OF CARE
Problem: Adult Inpatient Plan of Care  Goal: Plan of Care Review  Outcome: Progressing  Flowsheets (Taken 11/17/2019 0425)  Progress: improving  Plan of Care Reviewed With: patient  Outcome Summary: pt denies pain throughut this shift. VSS.

## 2019-11-17 NOTE — PLAN OF CARE
Problem: Adult Inpatient Plan of Care  Goal: Plan of Care Review  Outcome: Progressing  Flowsheets (Taken 11/17/2019 9195)  Progress: improving  Plan of Care Reviewed With: patient; spouse  Outcome Summary: pt denies pains this shift     Problem: Adult Inpatient Plan of Care  Goal: Patient-Specific Goal (Individualization)  Outcome: Progressing  Flowsheets (Taken 11/17/2019 3079)  Patient-Specific Goals (Include Timeframe): pt will have less anxiety by end of shift  Individualized Care Needs: rn will provide emotional support  Anxieties, Fears or Concerns: pt with needle anxiety     Problem: Adult Inpatient Plan of Care  Goal: Absence of Hospital-Acquired Illness or Injury  Outcome: Progressing     Problem: Adult Inpatient Plan of Care  Goal: Optimal Comfort and Wellbeing  Outcome: Progressing     Problem: Adult Inpatient Plan of Care  Goal: Readiness for Transition of Care  Outcome: Progressing

## 2019-11-18 ENCOUNTER — APPOINTMENT (INPATIENT)
Dept: RADIOLOGY | Facility: HOSPITAL | Age: 74
DRG: 244 | End: 2019-11-18
Attending: INTERNAL MEDICINE
Payer: MEDICARE

## 2019-11-18 ENCOUNTER — ANESTHESIA (INPATIENT)
Dept: CARDIOLOGY | Facility: HOSPITAL | Age: 74
DRG: 244 | End: 2019-11-18
Payer: MEDICARE

## 2019-11-18 ENCOUNTER — ANESTHESIA EVENT (INPATIENT)
Dept: CARDIOLOGY | Facility: HOSPITAL | Age: 74
DRG: 244 | End: 2019-11-18
Payer: MEDICARE

## 2019-11-18 PROBLEM — I44.30 AV BLOCK: Status: ACTIVE | Noted: 2019-11-12

## 2019-11-18 LAB
ANION GAP SERPL CALC-SCNC: 9 MEQ/L (ref 3–15)
BUN SERPL-MCNC: 12 MG/DL (ref 8–20)
CALCIUM SERPL-MCNC: 9.3 MG/DL (ref 8.9–10.3)
CHLORIDE SERPL-SCNC: 112 MEQ/L (ref 98–109)
CO2 SERPL-SCNC: 22 MEQ/L (ref 22–32)
CREAT SERPL-MCNC: 0.8 MG/DL
ERYTHROCYTE [DISTWIDTH] IN BLOOD BY AUTOMATED COUNT: 13.6 % (ref 11.7–14.4)
GFR SERPL CREATININE-BSD FRML MDRD: >60 ML/MIN/1.73M*2
GLUCOSE SERPL-MCNC: 101 MG/DL (ref 70–99)
HCT VFR BLDCO AUTO: 39.2 %
HGB BLD-MCNC: 13 G/DL (ref 11.8–15.7)
INR PPP: 1 INR
MAGNESIUM SERPL-MCNC: 2 MG/DL (ref 1.8–2.5)
MCH RBC QN AUTO: 29.7 PG (ref 28–33.2)
MCHC RBC AUTO-ENTMCNC: 33.2 G/DL (ref 32.2–35.5)
MCV RBC AUTO: 89.5 FL (ref 83–98)
PDW BLD AUTO: 8.9 FL (ref 9.4–12.3)
PLATELET # BLD AUTO: 194 K/UL
POTASSIUM SERPL-SCNC: 4.4 MEQ/L (ref 3.6–5.1)
PROTHROMBIN TIME: 13.3 SEC (ref 12.2–14.5)
RBC # BLD AUTO: 4.38 M/UL (ref 3.93–5.22)
SODIUM SERPL-SCNC: 143 MEQ/L (ref 136–144)
WBC # BLD AUTO: 5.88 K/UL

## 2019-11-18 PROCEDURE — 63600000 HC DRUGS/DETAIL CODE: Performed by: HOSPITALIST

## 2019-11-18 PROCEDURE — 0JH606Z INSERTION OF PACEMAKER, DUAL CHAMBER INTO CHEST SUBCUTANEOUS TISSUE AND FASCIA, OPEN APPROACH: ICD-10-PCS | Performed by: INTERNAL MEDICINE

## 2019-11-18 PROCEDURE — 25000000 HC PHARMACY GENERAL: Performed by: INTERNAL MEDICINE

## 2019-11-18 PROCEDURE — 25000000 HC PHARMACY GENERAL: Performed by: NURSE ANESTHETIST, CERTIFIED REGISTERED

## 2019-11-18 PROCEDURE — 21400000 HC ROOM AND CARE CCU/INTERMEDIATE

## 2019-11-18 PROCEDURE — 85610 PROTHROMBIN TIME: CPT | Performed by: HOSPITALIST

## 2019-11-18 PROCEDURE — C1898 LEAD, PMKR, OTHER THAN TRANS: HCPCS | Performed by: INTERNAL MEDICINE

## 2019-11-18 PROCEDURE — 85027 COMPLETE CBC AUTOMATED: CPT | Performed by: HOSPITALIST

## 2019-11-18 PROCEDURE — 93005 ELECTROCARDIOGRAM TRACING: CPT | Performed by: HOSPITALIST

## 2019-11-18 PROCEDURE — 33208 INSRT HEART PM ATRIAL & VENT: CPT | Performed by: INTERNAL MEDICINE

## 2019-11-18 PROCEDURE — C1894 INTRO/SHEATH, NON-LASER: HCPCS | Performed by: INTERNAL MEDICINE

## 2019-11-18 PROCEDURE — 02H63JZ INSERTION OF PACEMAKER LEAD INTO RIGHT ATRIUM, PERCUTANEOUS APPROACH: ICD-10-PCS | Performed by: INTERNAL MEDICINE

## 2019-11-18 PROCEDURE — 71000001 HC PACU PHASE 1 INITIAL 30MIN: Performed by: INTERNAL MEDICINE

## 2019-11-18 PROCEDURE — 36415 COLL VENOUS BLD VENIPUNCTURE: CPT | Performed by: HOSPITALIST

## 2019-11-18 PROCEDURE — 71045 X-RAY EXAM CHEST 1 VIEW: CPT

## 2019-11-18 PROCEDURE — 25800000 HC PHARMACY IV SOLUTIONS: Performed by: NURSE ANESTHETIST, CERTIFIED REGISTERED

## 2019-11-18 PROCEDURE — C1785 PMKR, DUAL, RATE-RESP: HCPCS | Performed by: INTERNAL MEDICINE

## 2019-11-18 PROCEDURE — 02HK3JZ INSERTION OF PACEMAKER LEAD INTO RIGHT VENTRICLE, PERCUTANEOUS APPROACH: ICD-10-PCS | Performed by: INTERNAL MEDICINE

## 2019-11-18 PROCEDURE — 99232 SBSQ HOSP IP/OBS MODERATE 35: CPT | Performed by: HOSPITALIST

## 2019-11-18 PROCEDURE — 37000002 HC ANESTHESIA MAC: Performed by: INTERNAL MEDICINE

## 2019-11-18 PROCEDURE — 80048 BASIC METABOLIC PNL TOTAL CA: CPT | Performed by: HOSPITALIST

## 2019-11-18 PROCEDURE — 33208 INSRT HEART PM ATRIAL & VENT: CPT | Mod: KX | Performed by: INTERNAL MEDICINE

## 2019-11-18 PROCEDURE — 63700000 HC SELF-ADMINISTRABLE DRUG: Performed by: HOSPITALIST

## 2019-11-18 PROCEDURE — 83735 ASSAY OF MAGNESIUM: CPT | Performed by: HOSPITALIST

## 2019-11-18 PROCEDURE — 63600000 HC DRUGS/DETAIL CODE: Performed by: NURSE ANESTHETIST, CERTIFIED REGISTERED

## 2019-11-18 PROCEDURE — 63600000 HC DRUGS/DETAIL CODE: Performed by: PHYSICIAN ASSISTANT

## 2019-11-18 PROCEDURE — C1769 GUIDE WIRE: HCPCS | Performed by: INTERNAL MEDICINE

## 2019-11-18 PROCEDURE — 93005 ELECTROCARDIOGRAM TRACING: CPT | Performed by: INTERNAL MEDICINE

## 2019-11-18 DEVICE — IPG W1DR01 AZURE XT DR MRI WL USA
Type: IMPLANTABLE DEVICE | Site: CHEST | Status: FUNCTIONAL
Brand: AZURE™ XT DR MRI SURESCAN™

## 2019-11-18 DEVICE — LEAD 5076-52 MRI US RCMCRD
Type: IMPLANTABLE DEVICE | Site: HEART | Status: FUNCTIONAL
Brand: CAPSUREFIX NOVUS MRI™ SURESCAN®

## 2019-11-18 DEVICE — LEAD 5076-45 MRI US RCMCRD
Type: IMPLANTABLE DEVICE | Site: HEART | Status: FUNCTIONAL
Brand: CAPSUREFIX NOVUS MRI™ SURESCAN®

## 2019-11-18 RX ORDER — SODIUM CHLORIDE 9 MG/ML
INJECTION, SOLUTION INTRAVENOUS CONTINUOUS PRN
Status: DISCONTINUED | OUTPATIENT
Start: 2019-11-18 | End: 2019-11-18 | Stop reason: SURG

## 2019-11-18 RX ORDER — FENTANYL CITRATE 50 UG/ML
50 INJECTION, SOLUTION INTRAMUSCULAR; INTRAVENOUS
Status: CANCELLED | OUTPATIENT
Start: 2019-11-18

## 2019-11-18 RX ORDER — ONDANSETRON HYDROCHLORIDE 2 MG/ML
4 INJECTION, SOLUTION INTRAVENOUS
Status: ACTIVE | OUTPATIENT
Start: 2019-11-18 | End: 2019-11-19

## 2019-11-18 RX ORDER — LIDOCAINE HYDROCHLORIDE 10 MG/ML
INJECTION, SOLUTION INFILTRATION; PERINEURAL AS NEEDED
Status: DISCONTINUED | OUTPATIENT
Start: 2019-11-18 | End: 2019-11-18 | Stop reason: HOSPADM

## 2019-11-18 RX ORDER — MIDAZOLAM HYDROCHLORIDE 2 MG/2ML
INJECTION, SOLUTION INTRAMUSCULAR; INTRAVENOUS AS NEEDED
Status: DISCONTINUED | OUTPATIENT
Start: 2019-11-18 | End: 2019-11-18 | Stop reason: SURG

## 2019-11-18 RX ORDER — LIDOCAINE HYDROCHLORIDE 10 MG/ML
INJECTION, SOLUTION EPIDURAL; INFILTRATION; INTRACAUDAL; PERINEURAL AS NEEDED
Status: DISCONTINUED | OUTPATIENT
Start: 2019-11-18 | End: 2019-11-18 | Stop reason: SURG

## 2019-11-18 RX ORDER — MEPERIDINE HYDROCHLORIDE 50 MG/ML
12.5 INJECTION INTRAMUSCULAR; INTRAVENOUS; SUBCUTANEOUS EVERY 10 MIN PRN
Status: CANCELLED | OUTPATIENT
Start: 2019-11-18

## 2019-11-18 RX ORDER — PROPOFOL 10 MG/ML
INJECTION, EMULSION INTRAVENOUS CONTINUOUS PRN
Status: DISCONTINUED | OUTPATIENT
Start: 2019-11-18 | End: 2019-11-18 | Stop reason: SURG

## 2019-11-18 RX ADMIN — LIDOCAINE HYDROCHLORIDE 5 ML: 10 INJECTION, SOLUTION EPIDURAL; INFILTRATION; INTRACAUDAL; PERINEURAL at 15:58

## 2019-11-18 RX ADMIN — ASPIRIN 81 MG: 81 TABLET, COATED ORAL at 08:59

## 2019-11-18 RX ADMIN — VANCOMYCIN HYDROCHLORIDE 1 G: 1 INJECTION, POWDER, LYOPHILIZED, FOR SOLUTION INTRAVENOUS at 14:32

## 2019-11-18 RX ADMIN — MIDAZOLAM HYDROCHLORIDE 2 MG: 1 INJECTION, SOLUTION INTRAMUSCULAR; INTRAVENOUS at 15:55

## 2019-11-18 RX ADMIN — PANTOPRAZOLE SODIUM 40 MG: 40 TABLET, DELAYED RELEASE ORAL at 08:59

## 2019-11-18 RX ADMIN — ACETAMINOPHEN 650 MG: 325 TABLET, FILM COATED ORAL at 23:05

## 2019-11-18 RX ADMIN — ROSUVASTATIN CALCIUM 20 MG: 20 TABLET, FILM COATED ORAL at 08:59

## 2019-11-18 RX ADMIN — SODIUM CHLORIDE: 9 INJECTION, SOLUTION INTRAVENOUS at 15:50

## 2019-11-18 RX ADMIN — PROPOFOL 80 MCG/KG/MIN: 10 INJECTION, EMULSION INTRAVENOUS at 15:58

## 2019-11-18 RX ADMIN — ONDANSETRON 4 MG: 2 INJECTION INTRAMUSCULAR; INTRAVENOUS at 17:30

## 2019-11-18 ASSESSMENT — ENCOUNTER SYMPTOMS: DYSRHYTHMIAS: 1

## 2019-11-18 NOTE — PLAN OF CARE
Problem: Adult Inpatient Plan of Care  Goal: Plan of Care Review  Outcome: Progressing  Flowsheets (Taken 11/18/2019 3646)  Outcome Summary: patient with no complaints, to cath lab. TOMMY Tomlinson sent with patient

## 2019-11-18 NOTE — ANESTHESIA POSTPROCEDURE EVALUATION
Patient: Jennifer Sams    Procedure Summary     Date:  11/18/19 Room / Location:  [facility] EP LAB 5 / [facility] CATH/EP    Anesthesia Start:  1550 Anesthesia Stop:  1744    Procedure:  PPM - dual chamber new (N/A ) Diagnosis:       AV block      (2:1 AV block)    Provider:  Jermaine Carolina MD Responsible Provider:  Chidi Douglas MD    Anesthesia Type:  MAC ASA Status:  3          Anesthesia Type: MAC  PACU Vitals     No data found in the last 10 encounters.            Anesthesia Post Evaluation    Pain management: adequate  Patient location during evaluation: PACU  Patient participation: complete - patient participated  Level of consciousness: awake and alert  Cardiovascular status: acceptable  Airway Patency: adequate  Respiratory status: acceptable, spontaneous ventilation and nasal cannula  Hydration status: acceptable  Anesthetic complications: no

## 2019-11-18 NOTE — NURSING NOTE
Patient with no complaints  Assessment complete  meds given   prasugrel held verbal from MD Carolina

## 2019-11-18 NOTE — PROGRESS NOTES
Hospital Medicine Service -  Daily Progress Note       SUBJECTIVE   Interval History: Patient scheduled for PPM today. No reports of CP or SOB.      OBJECTIVE      Vital signs in last 24 hours:  Temp:  [36.5 °C (97.7 °F)-37 °C (98.6 °F)] 36.7 °C (98.1 °F)  Heart Rate:  [65-82] 71  Resp:  [16-18] 16  BP: (126-145)/(60-72) 145/68    Intake/Output Summary (Last 24 hours) at 11/18/2019 1635  Last data filed at 11/18/2019 1613  Gross per 24 hour   Intake 100 ml   Output --   Net 100 ml       PHYSICAL EXAMINATION      Physical Exam   Constitutional: She is oriented to person, place, and time. She appears well-nourished. No distress.   HENT:   Head: Normocephalic and atraumatic.   Mouth/Throat: Oropharynx is clear and moist.   Eyes: Pupils are equal, round, and reactive to light. Conjunctivae and EOM are normal.   Neck: Normal range of motion. Neck supple.   Cardiovascular: Normal rate, regular rhythm and normal heart sounds.   Pulmonary/Chest: Effort normal and breath sounds normal.   Abdominal: Soft. Bowel sounds are normal.   Musculoskeletal: Normal range of motion. She exhibits no edema.   Neurological: She is alert and oriented to person, place, and time.   Psychiatric: Her behavior is normal. Judgment and thought content normal.   Nursing note and vitals reviewed.     LABS / IMAGING / TELE      Labs  Results from last 7 days   Lab Units 11/18/19  0531   SODIUM mEQ/L 143   POTASSIUM mEQ/L 4.4   CHLORIDE mEQ/L 112*   CO2 mEQ/L 22   BUN mg/dL 12   CREATININE mg/dL 0.8   GLUCOSE mg/dL 101*   CALCIUM mg/dL 9.3     Results from last 7 days   Lab Units 11/18/19  0531   WBC K/uL 5.88   HEMOGLOBIN g/dL 13.0   HEMATOCRIT % 39.2   PLATELETS K/uL 194         Imaging  I have independently reviewed the pertinent imaging from the last 24 hrs.    ECG/Telemetry  I have independently reviewed the telemetry. Significant findings include bradycardia .    ASSESSMENT AND PLAN      * Chest pain  Assessment & Plan  - trop negative x 2    - bnp 57; cxr nad   - continue dapt, statin  - exercise stress test terminated due to hypotension, bradycardia and shortness of breath; perfusion was normal at the workload achieved but the test is nondiagnostic due to poor exercise tolerance and inability to achieve target heart rate  - s/p cath with patent stents in the proximal and mid RCA, mid and distal LAD and proximal LCx into large OM1; 50% ostial LCx stenosis, iFR negative (1.0); normal left cardiac filling pressure, LVEDP 1 mmHg  - patient went into 2:1 AV block during cath   - ep c/s appreciated   - cards c/s appreciated   - PPM currently being placed, will monitor overnight and hopefully discharge tomorrow     Mobitz type 2 second degree atrioventricular block  Assessment & Plan  - PPM today   - continue to monitor on tele   - avoid av mata agents   - ep c/s appreciated    Coronary artery disease involving native coronary artery of native heart with angina pectoris (CMS/HCC)  Assessment & Plan  - STEMI 4/26/19, three-vessel CAD; status post FRANK x 2 LAD 4/27/19 + FRANK x 4 RCA 4/29/19 + FRANK x 1 LCX OM2 5/2/19 with resolved ischemic cardiomyopathy  - s/p cath with patent stents in the proximal and mid RCA, mid and distal LAD and proximal LCx into large OM1; 50% ostial LCx stenosis, iFR negative (1.0); normal left cardiac filling pressure, LVEDP 1 mmHg  - continue dapt, statin  - cards c/s appreciated     Elevated blood pressure reading without diagnosis of hypertension  Assessment & Plan  - continue to monitor and manage expectantly     GERD (gastroesophageal reflux disease)  Assessment & Plan  - continue ppi     Hypercholesterolemia  Assessment & Plan  - continue statin   - chol 159, tg 71, hdl 66, ldl 79         VTE Assessment: Padua VTE Score: 1  VTE Prophylaxis Plan: ambulation, refusing chemical ppx   Code Status: Full Code  Estimated Discharge Date: 11/19/2019  Disposition Planning: Home      ARIAN Melvin  11/18/2019

## 2019-11-18 NOTE — ANESTHESIA PREPROCEDURE EVALUATION
Relevant Problems   CARDIOVASCULAR   (+) AV block   (+) Coronary artery disease involving native coronary artery of native heart with angina pectoris (CMS/HCC)   (+) Ischemic cardiomyopathy   (+) Mobitz type 2 second degree atrioventricular block   (+) NSTEMI (non-ST elevated myocardial infarction) (CMS/HCC)   (+) Status post insertion of drug eluting coronary artery stent      GASTROINTESTINAL   (+) GERD (gastroesophageal reflux disease)      MUSCULOSKELETAL   (+) Osteoarthritis of multiple joints      NEUROLOGY   (+) History of rheumatic fever as a child       Anesthesia ROS/MED HX      Pulmonary - neg  Neuro/Psych - neg  Cardiovascular   CAD   Angina   dyslipidemia   hypertension  Dysrhythmias   MI (NSTEMI)   cardiomyopathy or myocarditis   Echocardiogram reviewed and ECG reviewed  GI/Hepatic   Hiatal hernia   GERD  Musculoskeletal   Osteoarthritis  Renal Disease- neg  Endo/Other- neg  Body Habitus: Normal  ROS/MED HX Comments:    Anesthesia History: Aware during endoscopy   Neurology/Psychology: Glaucoma   Cardiology: Pseudoclaudication Syndrome  Rheumatic Fever (childhood)  Raynauds Disease  AAA     Patient Active Problem List   Diagnosis   • Hypercholesterolemia   • Pseudoclaudication syndrome   • History of rheumatic fever as a child   • Glaucoma   • RSD (reflex sympathetic dystrophy)   • Raynaud's disease   • Osteoarthritis of multiple joints   • History of hip replacement, total, right   • GERD (gastroesophageal reflux disease)   • Elevated blood pressure reading without diagnosis of hypertension   • Abdominal aortic aneurysm (AAA) without rupture (CMS/HCC)   • NSTEMI (non-ST elevated myocardial infarction) (CMS/HCC)   • Ischemic cardiomyopathy   • Status post insertion of drug eluting coronary artery stent   • Chest pain   • Coronary artery disease involving native coronary artery of native heart with angina pectoris (CMS/HCC)   • Mobitz type 2 second degree atrioventricular block   • AV block       Past  Surgical History:   Procedure Laterality Date   • CHOLECYSTECTOMY OPEN     • CORONARY ANGIOPLASTY WITH STENT PLACEMENT  04/29/2019    of LAD   • CORONARY ANGIOPLASTY WITH STENT PLACEMENT  04/30/2019    of RCA   • DILATION AND CURETTAGE OF UTERUS     • EYE SURGERY      RIGHT CATARACT   • FOOT SURGERY Left    • JOINT REPLACEMENT Right 09/2016    total hip   • REVISION TOTAL HIP ARTHROPLASTY Right 2017   • TONSILLECTOMY         Physical Exam    Airway   Mallampati: II   TM distance: >3 FB   Neck ROM: full  Cardiovascular - normal   Rhythm: regular   Rate: normalPulmonary - normal   clear to auscultation  Dental - normal        Anesthesia Plan    Plan: MAC    Technique: MAC     Lines and Monitors: PIV     Airway: natural airway / supplemental oxygen   ASA 3  Blood Products:   Use of Blood Products Discussed: No   Anesthetic plan and risks discussed with: patient  Postop Plan:   Patient Disposition: inpatient floor planned admission   Pain Management: IV analgesics

## 2019-11-18 NOTE — PLAN OF CARE
Problem: Adult Inpatient Plan of Care  Goal: Plan of Care Review  Outcome: Progressing  Flowsheets (Taken 11/18/2019 0009)  Progress: improving  Plan of Care Reviewed With: patient  Outcome Summary: patient not complaining of any chest pain will continue to monitor

## 2019-11-19 ENCOUNTER — TELEPHONE (OUTPATIENT)
Dept: CARDIOLOGY | Facility: CLINIC | Age: 74
End: 2019-11-19

## 2019-11-19 VITALS
RESPIRATION RATE: 16 BRPM | HEIGHT: 67 IN | SYSTOLIC BLOOD PRESSURE: 147 MMHG | HEART RATE: 65 BPM | DIASTOLIC BLOOD PRESSURE: 66 MMHG | BODY MASS INDEX: 21.97 KG/M2 | WEIGHT: 140 LBS | TEMPERATURE: 98.1 F | OXYGEN SATURATION: 98 %

## 2019-11-19 LAB
ANION GAP SERPL CALC-SCNC: 7 MEQ/L (ref 3–15)
ATRIAL RATE: 67
ATRIAL RATE: 68
ATRIAL RATE: 86
BASOPHILS # BLD: 0.03 K/UL (ref 0.01–0.1)
BASOPHILS NFR BLD: 0.5 %
BUN SERPL-MCNC: 12 MG/DL (ref 8–20)
CALCIUM SERPL-MCNC: 8.5 MG/DL (ref 8.9–10.3)
CHLORIDE SERPL-SCNC: 111 MEQ/L (ref 98–109)
CO2 SERPL-SCNC: 21 MEQ/L (ref 22–32)
CREAT SERPL-MCNC: 0.9 MG/DL
DIFFERENTIAL METHOD BLD: NORMAL
EOSINOPHIL # BLD: 0.12 K/UL (ref 0.04–0.36)
EOSINOPHIL NFR BLD: 1.9 %
ERYTHROCYTE [DISTWIDTH] IN BLOOD BY AUTOMATED COUNT: 13.6 % (ref 11.7–14.4)
GFR SERPL CREATININE-BSD FRML MDRD: >60 ML/MIN/1.73M*2
GLUCOSE SERPL-MCNC: 99 MG/DL (ref 70–99)
HCT VFR BLDCO AUTO: 35.8 %
HGB BLD-MCNC: 11.7 G/DL (ref 11.8–15.7)
IMM GRANULOCYTES # BLD AUTO: 0.03 K/UL (ref 0–0.08)
IMM GRANULOCYTES NFR BLD AUTO: 0.5 %
LYMPHOCYTES # BLD: 1.7 K/UL (ref 1.2–3.5)
LYMPHOCYTES NFR BLD: 27.3 %
MCH RBC QN AUTO: 29.8 PG (ref 28–33.2)
MCHC RBC AUTO-ENTMCNC: 32.7 G/DL (ref 32.2–35.5)
MCV RBC AUTO: 91.3 FL (ref 83–98)
MONOCYTES # BLD: 0.7 K/UL (ref 0.28–0.8)
MONOCYTES NFR BLD: 11.2 %
NEUTROPHILS # BLD: 3.65 K/UL (ref 1.7–7)
NEUTS SEG NFR BLD: 58.6 %
NRBC BLD-RTO: 0 %
P AXIS: -1
P AXIS: 46
P AXIS: 55
PDW BLD AUTO: 9.2 FL (ref 9.4–12.3)
PLATELET # BLD AUTO: 166 K/UL
POTASSIUM SERPL-SCNC: 4.6 MEQ/L (ref 3.6–5.1)
PR INTERVAL: 168
PR INTERVAL: 172
PR INTERVAL: 172
QRS DURATION: 124
QRS DURATION: 82
QRS DURATION: 86
QT INTERVAL: 408
QT INTERVAL: 438
QT INTERVAL: 462
QTC CALCULATION(BAZETT): 431
QTC CALCULATION(BAZETT): 465
QTC CALCULATION(BAZETT): 552
R AXIS: -42
R AXIS: 19
R AXIS: 43
RBC # BLD AUTO: 3.92 M/UL (ref 3.93–5.22)
SODIUM SERPL-SCNC: 139 MEQ/L (ref 136–144)
T WAVE AXIS: 14
T WAVE AXIS: 54
T WAVE AXIS: 83
VENTRICULAR RATE: 67
VENTRICULAR RATE: 68
VENTRICULAR RATE: 86
WBC # BLD AUTO: 6.23 K/UL

## 2019-11-19 PROCEDURE — 63600000 HC DRUGS/DETAIL CODE: Performed by: NURSE PRACTITIONER

## 2019-11-19 PROCEDURE — 93010 ELECTROCARDIOGRAM REPORT: CPT | Mod: 76 | Performed by: INTERNAL MEDICINE

## 2019-11-19 PROCEDURE — 99238 HOSP IP/OBS DSCHRG MGMT 30/<: CPT | Performed by: HOSPITALIST

## 2019-11-19 PROCEDURE — 63700000 HC SELF-ADMINISTRABLE DRUG: Performed by: HOSPITALIST

## 2019-11-19 PROCEDURE — 93005 ELECTROCARDIOGRAM TRACING: CPT | Performed by: HOSPITALIST

## 2019-11-19 PROCEDURE — 36415 COLL VENOUS BLD VENIPUNCTURE: CPT | Performed by: NURSE PRACTITIONER

## 2019-11-19 PROCEDURE — 85025 COMPLETE CBC W/AUTO DIFF WBC: CPT | Performed by: HOSPITALIST

## 2019-11-19 PROCEDURE — 80048 BASIC METABOLIC PNL TOTAL CA: CPT | Performed by: NURSE PRACTITIONER

## 2019-11-19 RX ADMIN — ASPIRIN 81 MG: 81 TABLET, COATED ORAL at 08:16

## 2019-11-19 RX ADMIN — ROSUVASTATIN CALCIUM 20 MG: 20 TABLET, FILM COATED ORAL at 08:16

## 2019-11-19 RX ADMIN — PANTOPRAZOLE SODIUM 40 MG: 40 TABLET, DELAYED RELEASE ORAL at 08:16

## 2019-11-19 RX ADMIN — VANCOMYCIN HYDROCHLORIDE 1 G: 1 INJECTION, POWDER, LYOPHILIZED, FOR SOLUTION INTRAVENOUS at 02:52

## 2019-11-19 RX ADMIN — PRASUGREL 10 MG: 10 TABLET, FILM COATED ORAL at 08:16

## 2019-11-19 NOTE — TELEPHONE ENCOUNTER
MsNatalie Latrell is enrolled with [employer] for remote monitoring. Monitor will be mailed to pt.

## 2019-11-19 NOTE — PROGRESS NOTES
Post op EP Wound Check:     Post op chest xray no pneumothorax  WBC wnl. Tele shows NSR 60-70 with brief ventricular paced beats at 100 bpm correlating with OOB episode.Reviewed tele strips with Dr. Obregon who deemed Sinus tach with pacing briefly tracking.   Explained to patient that pacemaker is functioning as it should.   Left prepectoral wound site with moderate ecchymosis to left of incision and toward axillary area. Old steri strips stained with brown blood so reapplied clean strips. No hematoma, scant bloody ooze noted during steri strip reapplication. Wound closing without sign of dehiscense. No active bleeding present. Taking Tylenol for pain.   Reviewed discharge instructions. Arranged one week f/u with  EP office. Enrolled in remote monitoring of pacemaker. Discussed with ARIAN.     Patient concerned about tele event as her alarm went off this morning so I did device check.   Mdt dual chamber in DDD mode    Battery initializing  Atrial paced 5.1%  RV paced 12.2 %  Atrial lead impedance 665 ohms  RV lead impedance 608 ohms  Atrial threshold 0.375V@0.4ms  RV threshold 0.375V@0.4ms   P wave 2.3MV  R wave 9.9 mV  No events on logbook review  AT/AF 0%  VT 0

## 2019-11-19 NOTE — NURSING NOTE
Tele box removed, IV access removed, d/c instructions given and pt verbalized understanding . Pt walked off the unit escorted by her

## 2019-11-19 NOTE — PLAN OF CARE
Problem: Adult Inpatient Plan of Care  Goal: Plan of Care Review  Outcome: Progressing  Flowsheets (Taken 11/19/2019 0402)  Progress: improving  Plan of Care Reviewed With: patient  Outcome Summary: pts having mild discomfort where pacemaker was placed will continue to assess

## 2019-11-19 NOTE — DISCHARGE INSTRUCTIONS
Ms. Sams,     You were treated for chest discomfort. You were evaluated by the cardiology team. You underwent a stress test last week but unfortunately it was inconclusive as your blood pressure dropped during the test. A cardiac catheterization was completed on Friday 11/16, but your went into an abnormal heart rhythm called 2nd degree AV block. You were evaluated by the EP team and a pacemaker was placed on Monday 11/18. You tolerated the procedure well.     You will need to follow up with EP cardiology in one week after discharge. You can call [phone] to schedule an appointment.     We also recommend that you follow up with your primary care doctor within a week of discharge to discuss your recent hospitalization.      Thank you for choosing [facility]! It has been a pleasure caring for you!    ,,,,,,,,,,,,,,,,,,,,,,,,,,,,  PACEMAKER DISCHARGE INSTRUCTIONS  * Returning to work, driving, and other daily activities should be discussed with your doctor before discharge from the hospital. Generally, driving is safe after 72 hours unless you are taking narcotics for pain relief.    * Notify your doctor immediately if you notice any of the following: fever, chills, warmth, redness, swelling, or drainage at your incision.    There are some restrictions for your arm on the same side as your device:  * Do not reach behind your back or lift your arm above shoulder level for six weeks. After this period, it is usually safe to resume normal physical activities.    * Avoid activities that involve exertional or vigorous movements of the affected arm, such as golf or tennis, until you are cleared at your 6 week follow up visit.    * Avoid lifting any objects greater than ten pounds for six weeks.    * Some pacemaker is MRI compatible. Please discuss this further with your electrophysiologist before scheduling an MRI.    * House-hold electronics (microwaves, cellular phones) are safe for use.    * Place the temporary  identification card in your wallet at time of discharge and replace it with your permanent card once it is mailed to your home in four to eight weeks. Make sure you inform healthcare providers that you have an implanted device, particularly if surgical procedures are scheduled.    * Please read your device 's information booklet for future reference. Write down any questions you may have and bring a list with you to your follow-up appointment.    * You were provided instructions for use of your home monitoring system. This was also demonstrated for you. Please review the information and connect the monitor at your bedside, if applicable. Perform a manual transmission tomorrow morning, as described in the instructions.     * Please keep all appointments to have your device checked. You will be scheduled for a follow-up appointment to assess your incision in 1-2 weeks. You will also be scheduled to have an office visit for device check in 4-6 weeks. After that, your office device interrogations will usually be every 6-12 months.    Wound Care:  * You can remove your dressing and arm sling the morning after the procedure.    * Keep your incision dry for 48 hours. When showering after that, keep the incision out of a direct stream of water for one week, and use soap and water only. Do not scrub the incision. Instead, pat it dry. Do not apply any creams, lotions, or powders to your incision.    * If your incision has steri-strips, do not remove them. They will fall off on their own. You may clip the ends with scissors if they curl up.    * You should avoid submerging the incision under water such as in a hot tub, bath tub, swimming pool, or ocean until your incision is completely healed in 6 weeks.    Things to consider with your pacemaker:  * Any large magnets including industrial equipment, induction furnaces, welding, or large electrical motors.    * Security systems at airports do not interfere with your  device. However, your device is made of metal and will therefore set off metal detectors. Have your device identification card ready for presentation to the . They will search the rest of your body using a handheld wand.    * Some medical procedures use cautery, diathermy, ultrasound therapy, and radiation therapy which may interfere with your device. Notify the performing physician that you have a pacemaker.    * Do not push on the area around your device or twist the device under the skin.

## 2019-11-21 LAB
ATRIAL RATE: 0
QRS DURATION: 0
QT INTERVAL: 0
QTC CALCULATION(BAZETT): 0
R AXIS: 0
T WAVE AXIS: 0
VENTRICULAR RATE: 0

## 2019-11-22 ENCOUNTER — TELEPHONE (OUTPATIENT)
Dept: SCHEDULING | Facility: CLINIC | Age: 74
End: 2019-11-22

## 2019-11-22 NOTE — TELEPHONE ENCOUNTER
No abnormal rhythms. Pacemaker functioning normally with no loss of capture, impedances are normal and sensing is adequate.

## 2019-11-22 NOTE — TELEPHONE ENCOUNTER
"Spoke to pt. She had an interrogation done and there are no arrhythmias noted and there were no alerts reported. The pacemaker appears to be functioning normally.    Reviewed her sxs and she had undergone cardiac cath so not likely to be angina. She does not have any sxs of fluid overload as she denies weight gain, swelling or orthopnea. She is drinking plenty of fluids so sates she is not dehydrated.    Re-reviewed with EP and her HR does increase to 120 bpm at times so she might need the rate responsiveness adjusted but that can be done at her follow-up appt on Tuesday.     Discussed again with Dr Courtney and the onl y other thing he can think of is a possible perforation with the pacemaker placement.    Called and spoke to pt for the 3rd time. She states her sxs have worsened since she has been home. Just went up the stairs and was SOB (she sounded SOB). I told her that she should go to the ER to be evaluated to make sure she does not have a perforation as an echo can be done in the ER and she would not have to be admitted if there was no issue.. She kept on stating \"it's the weekend and hat are they going to do? I'll have to stay .\" I again reiterated that she would not have to stay if there was not an issue. A cardiology consult would be placed and her pacemaker can be interrogated. She stated she would coinsider going to the ER in the AM. I told her that if her sxs continue to worse, even a little bit, she needed to go to the ER ASAP.She again stated that she would go if she is worse but would rather wait until her pacer is checked on Tuesday.   I told her \"you don't want to join Michael\" - her boss of 22 years who just  and had his  today. She stated no but would gp to ER if her sxs worsened.  "

## 2019-11-22 NOTE — TELEPHONE ENCOUNTER
Dr. Courtney patient.  Recent pacemaker implant with Dr. Carolina.    Mrs. Sams contacted the office and asked to be seen in the office now by Dr. Courtney.    Patient hospitalized 11/12/19-11/19/19 for chest pain. Her cardiac enzymes were negative. She had a stress test which was terminated due to hypotension, bradycardia and SOB. Perfusion normal at level of exercise achieved but nondiagnostic due to poor exercise tolerance. She had a cardiac catheterization which revealed patent stents.  Patient developed Mobitz type 2 second degree heart block and had a permanent pacemaker placed.    At present she feels SOB after walking 5 steps, has intermittent dizziness, nausea and heart racing sensation. I recommended ER as she described such significant symptoms but she does not want to consider this. Her hospital CXR revealed NAD and her BNP was 57. Since hospital discharge her BP readings have been 120s-130s/60s-80s with HR range from 67-92.    She currently has a device check on 11/26/19 and an appointment with Dr. Courtney on 1/6/19.    Routing to EP/Interventional provider pools and device team. I asked Mrs. Sams to send a remote transmission for your review.  Patient call back # [phone]. Thanks.

## 2019-11-26 ENCOUNTER — OFFICE VISIT (OUTPATIENT)
Dept: CARDIOLOGY | Facility: CLINIC | Age: 74
End: 2019-11-26
Payer: MEDICARE

## 2019-11-26 VITALS
SYSTOLIC BLOOD PRESSURE: 128 MMHG | WEIGHT: 143.5 LBS | DIASTOLIC BLOOD PRESSURE: 80 MMHG | HEIGHT: 67 IN | BODY MASS INDEX: 22.52 KG/M2 | RESPIRATION RATE: 16 BRPM

## 2019-11-26 DIAGNOSIS — R06.09 DYSPNEA ON EXERTION: Primary | ICD-10-CM

## 2019-11-26 DIAGNOSIS — I31.39 PERICARDIAL EFFUSION: ICD-10-CM

## 2019-11-26 DIAGNOSIS — I25.5 ISCHEMIC CARDIOMYOPATHY: ICD-10-CM

## 2019-11-26 DIAGNOSIS — R07.1 CHEST PAIN ON BREATHING: ICD-10-CM

## 2019-11-26 PROCEDURE — 93000 ELECTROCARDIOGRAM COMPLETE: CPT | Performed by: NURSE PRACTITIONER

## 2019-11-26 PROCEDURE — 99024 POSTOP FOLLOW-UP VISIT: CPT | Performed by: NURSE PRACTITIONER

## 2019-11-26 NOTE — PROGRESS NOTES
Reason for visit: Follow-up Medtronic dual-chamber pacemaker implanted by Dr. Carolina on 11/18/2019 for 2:1 AV block.     HPI   Jennifer Sams is a 74 y.o. female with a history of triple vessel CAD, NSTEMI 4/26/19,  s/p FRANK x 2 LAD 4/27/19 + FRANK x 4 RCA 4/29/19 + FRANK x 1 LCX OM2 5/2/19, hypercholesterolemia, resolved ischemic cardiomyopathy, hiatal hernia, GERD, and osteoarthritis of her hips  who underwent a dual-chamber pacemaker implant for 2:1 AV block on 11/18/2019 by Dr. Carolina.  Patient comes to the office today for follow-up of her pacemaker.  Patient complaints that she has not been feeling well since after the implant.  He has been experiencing severe palpitation, nausea, dizziness, shortness of breath with exertion.  She reports that her palpitation triggers the cough and she turns really pale.  Patient reports that her symptoms are aggravated with exertion and resolved with rest.      Past Medical History:   Diagnosis Date   • AAA (abdominal aortic aneurysm) (CMS/[facility])    • Arthritis    • Elevated blood pressure reading without diagnosis of hypertension 4/19/2019   • GERD (gastroesophageal reflux disease) 4/19/2019   • Glaucoma 4/19/2019   • Heart murmur    • Hiatal hernia    • History of hip replacement, total, right 4/19/2019    Right hip 9/ 2016 and revision 2017    • History of rheumatic fever as a child 4/19/2019   • Hypercholesterolemia 4/19/2019   • Ischemic cardiomyopathy 5/9/2019   • Osteoarthritis of multiple joints 4/19/2019   • Osteoporosis    • Raynaud's disease 4/19/2019   • RSD (reflex sympathetic dystrophy) 4/19/2019    Of left foot   • Status post insertion of drug eluting coronary artery stent 5/9/2019     Past Surgical History:   Procedure Laterality Date   • CHOLECYSTECTOMY OPEN     • CORONARY ANGIOPLASTY WITH STENT PLACEMENT  04/29/2019    of LAD   • CORONARY ANGIOPLASTY WITH STENT PLACEMENT  04/30/2019    of RCA   • DILATION AND CURETTAGE OF UTERUS     • EYE SURGERY      RIGHT  CATARACT   • FOOT SURGERY Left    • JOINT REPLACEMENT Right 09/2016    total hip   • REVISION TOTAL HIP ARTHROPLASTY Right 2017   • TONSILLECTOMY       Social History     Social History Narrative   • Not on file     Family History   Problem Relation Age of Onset   • Congenital heart disease Biological Mother         noted at autopsy   • Heart attack Paternal Grandfather      Ace inhibitors; Atorvastatin; Brilinta [ticagrelor]; Codeine; Dilaudid [hydromorphone]; Onion; Oxycodone; and Morphine sulfate  Current Outpatient Medications   Medication Sig Dispense Refill   • aspirin (ASPIR-81) 81 mg enteric coated tablet Take 1 tablet (81 mg total) by mouth daily. 90 tablet 5   • chlorpheniramine (CHLOR-TRIMETON) 4 mg tablet Take 4 mg by mouth every 6 (six) hours as needed for allergies.     • nitroglycerin (NITROSTAT) 0.4 mg SL tablet Place 1 tablet (0.4 mg total) under the tongue every 5 (five) minutes as needed for chest pain. 25 tablet 4   • pantoprazole (PROTONIX) 40 mg EC tablet Take 40 mg by mouth daily.     • prasugrel (EFFIENT) 10 mg tablet Take 1 tablet (10 mg total) by mouth daily. 90 tablet 4   • rosuvastatin (CRESTOR) 20 mg tablet Take 1 tablet (20 mg total) by mouth daily. 90 tablet 4     No current facility-administered medications for this visit.      11/18/2019 Dual-chamber Medtronic pacemaker implant by Dr. Carolina for paroxysmal Mobitz second-degree AV block    11/15/2019 Cardiac  Catheterization-     FINDINGS:  1.  Patent stents in the proximal and mid RCA, mid and distal LAD and proximal LCx into large OM1.     2.  50% ostial LCx stenosis, iFR negative (1.0)     3.  Normal left cardiac filling pressure, LVEDP 1 mmHg        RECOMMENDATIONS:    Optimal medical therapy and aggressive risk factor modification.     11/13/2019 exercise stress with mild perfusion SPECT  Interpretation Summary     1. Exercise technetium tetrofosmin was terminated at 2 minutes due to hypotension (BP dropped from 110/80 to 80/60  mmHg) and bradycardia (HR dropped from 97 to 57 bpm). Patient was markedly short of breath during exercise.  Perfusion was normal at the workload achieved but the test is nondiagnostic due to poor exercise tolerance and inability to achieve target heart rate.    2. ECG is non-diagnostic due to not achieving the target heart rate.  3. Gated imaging reveals normal wall motion with hyperdynamic left ventricular systolic function.  4. The results were discussed with the consulting cardiology fellow.         6/11/2019 Transthoracic Echo    Interpretation Summary     Technically very difficult study.  Mild concentric left ventricular hypertrophy.  Normal cavity size.  Ejection fraction 55 to 60%.  Impaired left ventricular relaxation.  Mitral valve sclerosis.  Normal left atrium.  Aortic valve and root sclerosis with trace aortic insufficiency.  Normal right heart.  Thickened pericardium.  Compared to the study 4/27/2019, systolic function has normalized.     5/2/19  STAGED INTERVENTION:  Successful PCI of the OM lesion with a Resolute Jason 2.00 x 30mm FRANK, post-dilated with an 2.5 x 20 mm non-compliant balloon to 16 ana, with no residual stenosis and ITALO 3 flow.  4/30/2019 STAGED INTERVENTION:  1. Successful PCI of the proximal RCA lesion with a Resolute [place] 3.0 x 15mm RFANK, post-dilated with a 3.5 x 12 mm non-compliant balloon to 20 ana, with no residual stenosis and ITALO 3 flow.  2. Successful PCI of the mid RCA lesion with a Resolute Jason 3.0 x 15mm FRANK, deployed at 16 ana, with no residual stenosis and ITALO 3 flow.  3. Successful PCI of the distal RCA and proximal PDA lesion with over-lapping drug-eluting stents (Resolute [place] 2.00 x 22mm FRANK and Resolute Jason 2.00 x 15mm FRANK), post-dilated with a 2.5 x 15 mm non-compliant balloon to 18 ana, with no residual stenosis and ITALO 3 flow.    4/29/2019 Left Heart Cath with PCI    FINDINGS:  1. 95-99% mid LAD stenosis and a long 80% mid to distal LAD stenosis.   2. 90%  proximal, 90% mid and 90-95% distal RCA lesions.   3. 95-99% proximal to mid OM 2 stenosis.   4. Elevated LVEDP (23 mmHg).     INTERVENTION:  1. Successful PCI of the mid LAD lesion with a  Resolute [place] 2.50 X 18mm FRANK, deployed at 12 ana, with no residual stenosis and ITALO 3 flow.  2. Successful PCI of the mid to distal LAD with over-lapping drug-eluting stents (Resolute [place] 2.0 x 26 and  Resolute Jason 2.0 mm x 30 mm FRANK), post-dilated with the stent balloon up to 14 ana, with no residual stenosis and ITALO 3 flow.     Objective    Wt Readings from Last 3 Encounters:   11/26/19 65.1 kg (143 lb 8 oz)   11/18/19 63.5 kg (140 lb)   09/10/19 65.3 kg (144 lb)     Temp Readings from Last 3 Encounters:   11/19/19 36.7 °C (98.1 °F) (Oral)   05/03/19 36.6 °C (97.8 °F) (Oral)     BP Readings from Last 3 Encounters:   11/26/19 128/80   11/19/19 (!) 147/66   09/10/19 138/70     Pulse Readings from Last 3 Encounters:   11/19/19 65   09/10/19 67   06/11/19 72     Cardiac Imaging   TRANSTHORACIC ECHO (TTE) COMPLETE 06/11/2019    Narrative Technically very difficult study.  Mild concentric left ventricular hypertrophy.  Normal cavity size.    Ejection fraction 55 to 60%.  Impaired left ventricular relaxation.  Mitral valve sclerosis.  Normal left atrium.  Aortic valve and root sclerosis with trace aortic insufficiency.  Normal right heart.  Thickened pericardium.  Compared to the study 4/27/2019, systolic function has normalized.   Review of system:  Pertinent ROS as per HPI , all other systems negative    Physical Exam   Constitutional: She is oriented to person, place, and time. She appears well-developed and well-nourished.   HENT:   Head: Normocephalic and atraumatic.   Eyes: Conjunctivae are normal.   Neck: Normal range of motion. Neck supple.   Cardiovascular: Normal rate and regular rhythm.   Pulmonary/Chest: Effort normal and breath sounds normal.   Abdominal: Soft. Bowel sounds are normal.   Musculoskeletal: Normal range  of motion. She exhibits no edema.   Neurological: She is alert and oriented to person, place, and time. She has normal reflexes.   Skin: Skin is warm and dry.   Psychiatric: She has a normal mood and affect. Her behavior is normal. Judgment and thought content normal.       Lab Results   Component Value Date    WBC 6.23 2019    HGB 11.7 (L) 2019     2019    CHOL 159 2019    TRIG 71 2019    HDL 66 2019    ALT 18 2019    AST 25 2019     2019    K 4.6 2019    CREATININE 0.9 2019    TSH 2.75 11/15/2019    INR 1.0 2019      ECG   I have personally reviewed the EKG. It shows sinus rhythm with ventricular rate at 62 bpm.    Device interrogation:  Device: Dual Chamber Pacemaker     Model : Emay Softcom DR XT DR MRI W1 DR 01, serial #[id number].     Atrial pacing electrode is a Medtronic, model #5076, the serial #[id number].     Right ventricular pacing electrode is a Medtronic, model #5076, the serial #[id number]    Iaknpiinq66/18/2019 Battery: 13.4 years    Underlying rhythm: sinus rhythm  Presenting rhythm: A sensed V paced  Pacemaker dependent:No      P wave:   1.5 mV;        Threshold: 0.75 V at  0.4 ms            Impedance: 532hms  RV wave: 12.1 mV;      Threshold: 0.75 V at  0.4 ms            Impedance: 513 ohms    Atrial tachyarrhythmia: 0  Ventricular tachyarrhythmia: 0    Atrial pacin.7 %;   Ventricular pacin.7%      Settings  Mode: DDD  Lower Rate Limit: 60 bpm  Upper Tracking Rate: 140 bpm    VT Monitor : >150 bpmbpm  AT/AF >150 bpm    Summary:  Normally functioning dual-chamber pacemaker. The paced AV interval was decreased from 260 ms to 180 ms and sensed AV interval was changed from 260ms to 160 ms.  AT /AF detection monitor rate was set at 350 ms (171 bpm) and VT detection monitor was set at 400 ms (150 bpm). With the revised setting patient ambulated in the hallway without any discomfort.  Post Op wound Check   Left  Pectoral Pacer site- Surgical incision C/D/I , margins have minimal erythema and are well approximated with steri-strips, no dehiscence, no drainage. No signs of hematoma or seroma formation.      Assessment   Mobitz type 2 second degree atrioventricular block  Assessment & Plan  Symptomatic 2:1 AV block consistent with Mobitz 2 block. She underwent implantation of Medtronic dual-chamber pacemaker on 11/18/2019.  Since the pacemaker implant patient has not been feeling well. She complained of palpitation, shortness of breath, nausea and dizziness with minimal exertion.  Her symptoms resolved with decreasing the AV interval in the settings. The paced AV interval was decreased from 260 ms to 180 ms and sensed AV interval was changed from 260ms to 160 ms.  AT /AF detection monitor rate was set at 350 ms (171 bpm) and VT detection monitor was set at 400 ms (150 bpm). With the revised setting patient ambulated in the hallway without any discomfort.  She checks her pacemaker using the remote monitoring system and she should return to the office for follow-up in 2 to 3 weeks. Ms. Sams is enrolled with [employer] and [employer] for remote monitoring. Case discussed with Rosey Iraheta  11/26/2019

## 2019-11-29 ENCOUNTER — HOSPITAL ENCOUNTER (OUTPATIENT)
Dept: CARDIOLOGY | Facility: CLINIC | Age: 74
Discharge: HOME | End: 2019-11-29
Attending: INTERNAL MEDICINE
Payer: MEDICARE

## 2019-11-29 VITALS — WEIGHT: 143 LBS | HEIGHT: 67 IN | BODY MASS INDEX: 22.44 KG/M2

## 2019-11-29 DIAGNOSIS — I31.39 PERICARDIAL EFFUSION: ICD-10-CM

## 2019-11-29 DIAGNOSIS — R06.09 DYSPNEA ON EXERTION: ICD-10-CM

## 2019-11-29 DIAGNOSIS — R07.1 CHEST PAIN ON BREATHING: ICD-10-CM

## 2019-11-29 LAB
BSA FOR ECHO PROCEDURE: 1.75 M2
E WAVE DECELERATION TIME: 190 MS
E/A RATIO: 0.9
FRACTIONAL SHORTENING: 27.8 %
INTERVENTRICULAR SEPTUM: 0.78 CM
LEFT INTERNAL DIMENSION IN SYSTOLE: 3.01 CM (ref 2.44–3.69)
LEFT VENTRICULAR INTERNAL DIMENSION IN DIASTOLE: 4.17 CM (ref 4.12–5.71)
LEFT VENTRICULAR POSTERIOR WALL IN END DIASTOLE: 0.84 CM (ref 0.54–1)
MV PEAK A VEL: 0.6 M/S
MV PEAK E VEL: 0.52 M/S
POSTERIOR WALL: 0.84 CM
Z-SCORE OF LEFT VENTRICULAR DIMENSION IN END DIASTOLE: -1.51
Z-SCORE OF LEFT VENTRICULAR DIMENSION IN END SYSTOLE: 0.03
Z-SCORE OF LEFT VENTRICULAR POSTERIOR WALL IN END DIASTOLE: 0.7

## 2019-11-29 PROCEDURE — 93308 TTE F-UP OR LMTD: CPT | Performed by: INTERNAL MEDICINE

## 2019-12-09 PROBLEM — Z95.0 PACEMAKER: Status: ACTIVE | Noted: 2019-12-09

## 2019-12-09 NOTE — PROGRESS NOTES
Electrophysiology  Outpatient Consult Note         HPI   Jennifer Sams is a 74 y.o. female who is seen today status post dual-chamber pacemaker on November 18, 2019 indicated for 2:1 AV block.  She returned to the office for follow-up of her pacemaker with complaints of continued dyspnea. Minor adjustments were made to optimize AV timing, which resulted in mild improvement. She continues however with not feeling well, experiencing continued dyspnea and shortness of breath with exertion.  An echocardiogram shows no evidence of pericardial effusion.     Past Medical History:   Diagnosis Date   • AAA (abdominal aortic aneurysm) (CMS/HCC)    • Arthritis    • Elevated blood pressure reading without diagnosis of hypertension 4/19/2019   • GERD (gastroesophageal reflux disease) 4/19/2019   • Glaucoma 4/19/2019   • Heart murmur    • Hiatal hernia    • History of hip replacement, total, right 4/19/2019    Right hip 9/ 2016 and revision 2017    • History of rheumatic fever as a child 4/19/2019   • Hypercholesterolemia 4/19/2019   • Ischemic cardiomyopathy 5/9/2019   • Osteoarthritis of multiple joints 4/19/2019   • Osteoporosis    • Pacemaker 12/9/2019   • Raynaud's disease 4/19/2019   • RSD (reflex sympathetic dystrophy) 4/19/2019    Of left foot   • SOB (shortness of breath) 12/10/2019   • Status post insertion of drug eluting coronary artery stent 5/9/2019       Past Surgical History:   Procedure Laterality Date   • CHOLECYSTECTOMY OPEN     • CORONARY ANGIOPLASTY WITH STENT PLACEMENT  04/29/2019    of LAD   • CORONARY ANGIOPLASTY WITH STENT PLACEMENT  04/30/2019    of RCA   • DILATION AND CURETTAGE OF UTERUS     • EYE SURGERY      RIGHT CATARACT   • FOOT SURGERY Left    • JOINT REPLACEMENT Right 09/2016    total hip   • REVISION TOTAL HIP ARTHROPLASTY Right 2017   • TONSILLECTOMY         Allergies: Ace inhibitors; Atorvastatin; Brilinta [ticagrelor]; Codeine; Dilaudid [hydromorphone]; Onion; Oxycodone; and Morphine  sulfate    Current Outpatient Medications   Medication Sig Dispense Refill   • aspirin (ASPIR-81) 81 mg enteric coated tablet Take 1 tablet (81 mg total) by mouth daily. 90 tablet 5   • nitroglycerin (NITROSTAT) 0.4 mg SL tablet Place 1 tablet (0.4 mg total) under the tongue every 5 (five) minutes as needed for chest pain. 25 tablet 4   • pantoprazole (PROTONIX) 40 mg EC tablet Take 40 mg by mouth daily.     • prasugrel (EFFIENT) 10 mg tablet Take 1 tablet (10 mg total) by mouth daily. 90 tablet 4   • rosuvastatin (CRESTOR) 20 mg tablet Take 1 tablet (20 mg total) by mouth daily. 90 tablet 4     No current facility-administered medications for this visit.        Social History     Tobacco Use   • Smoking status: Never Smoker   • Smokeless tobacco: Never Used   Substance Use Topics   • Alcohol use: Yes     Comment: VERY RARE   • Drug use: No       Family History   Problem Relation Age of Onset   • Congenital heart disease Biological Mother         noted at autopsy   • Heart attack Paternal Grandfather        Review of Systems   Constitution: Positive for malaise/fatigue.   HENT: Positive for congestion.    Eyes: Negative.    Cardiovascular: Positive for dyspnea on exertion and palpitations.   Respiratory: Positive for cough and shortness of breath.    Endocrine: Negative.    Hematologic/Lymphatic: Negative for bleeding problem.   Skin: Negative.    Musculoskeletal: Negative.    Gastrointestinal: Negative.    Genitourinary: Negative.    Neurological: Positive for weakness.   Psychiatric/Behavioral: Negative.        Objective     Vitals:    12/10/19 0958   BP: 128/76   Pulse: 68   Resp: 16       Physical Exam   Constitutional: She is oriented to person, place, and time. She appears well-developed and well-nourished.   HENT:   Head: Normocephalic and atraumatic.   Eyes: Conjunctivae are normal.   Neck: Normal range of motion. Neck supple.   Cardiovascular: Normal rate, regular rhythm and normal heart sounds.   No murmur  heard.  Pulmonary/Chest: Effort normal and breath sounds normal.   Abdominal: Soft. Bowel sounds are normal.   Musculoskeletal: Normal range of motion. She exhibits no edema.   Neurological: She is alert and oriented to person, place, and time. She has normal reflexes.   Skin: Skin is warm and dry.   Psychiatric: She has a normal mood and affect. Her behavior is normal. Judgment and thought content normal.        Labs   Lab Results   Component Value Date    WBC 6.23 11/19/2019    HGB 11.7 (L) 11/19/2019    HCT 35.8 11/19/2019     11/19/2019    CHOL 159 11/12/2019    TRIG 71 11/12/2019    HDL 66 11/12/2019    ALT 18 11/16/2019    AST 25 11/16/2019     11/19/2019    K 4.6 11/19/2019     (H) 11/19/2019    CREATININE 0.9 11/19/2019    BUN 12 11/19/2019    CO2 21 (L) 11/19/2019    TSH 2.75 11/15/2019    INR 1.0 11/18/2019     BP Readings from Last 3 Encounters:   12/10/19 128/76   11/26/19 128/80   11/19/19 (!) 147/66     PACEMAKER IMPLANTATION- 11/18/2019  Implanted Hardware:   The pacemaker generator is a [employer], model W1 DR 01, serial #[id number].   The atrial pacing electrode is a Medtronic, model #5076, the serial #[id number].   The right ventricular pacing electrode is a Medtronic, model #5076, the serial #[id number].     Intraoperative Testing:   Right atrium: intrinsic amplitude 2.2 mV, impedance 1025 ohms, pacing threshold 0.8 V @0.5 msec.   Right ventricle: intrinsic amplitude 9.6 mV, impedance 864 ohms, pacing threshold 0.4 V @0.5 msec.    CARDIAC CATH- 11/15/2019  Patent stents in the proximal and mid RCA, mid and distal LAD and proximal LCx into large OM1.  50% ostial LCx stenosis, iFR negative (1.0)   Normal left cardiac filling pressure, LVEDP 1 mmHg    CV NM EXERCISE STRESS 11/13/2019  Exercise technetium tetrofosmin was terminated at 2 minutes due to hypotension (BP dropped from 110/80 to 80/60 mmHg) and bradycardia (HR dropped from 97 to 57 bpm). Patient was markedly short of  breath during exercise. Perfusion was normal at the workload achieved but the test is nondiagnostic due to poor exercise tolerance and inability to achieve target heart rate.    ECG is non-diagnostic due to not achieving the target heart rate.  Gated imaging reveals normal wall motion with hyperdynamic left ventricular systolic function.  The results were discussed with the consulting cardiology fellow.     ECHOCARDIOGRAM 11/29/2019  Technically limited study no gross chamber enlargement  Imaging done just to exclude pericardial effusion Limited echo study  No regional wall motion abnormality preserved ejection fraction 55 to 60%  Aortic sclerosis  Mitral tricuspid valves appear normal  Prominent anterior fat pad no evidence of pericardial effusion or tamponade    ECG Atrial sense and ventricular fusing    Assessment/Plan   Problem List Items Addressed This Visit        Nervous    Coronary artery disease involving native coronary artery of native heart with angina pectoris (CMS/[facility])     CARDIAC CATH- 11/15/2019  Patent stents in the proximal and mid RCA, mid and distal LAD and proximal LCx into large OM1.  50% ostial LCx stenosis, iFR negative (1.0)   Normal left cardiac filling pressure, LVEDP 1 mmHg         Relevant Orders    ECG 12 LEAD-OFFICE PERFORMED (Completed)       Respiratory    SOB (shortness of breath)     Continued SOB mainly with exertion, in particular steps. Increase her,exercise, most likely deconditioning. Encouraged to begin daily mild exrecise. She believes it may be related to her allergies. She has had to stop her allergy meds since April. She will discuss with Dr Courtney at her January visit resuming some of her medications. At that time can also discuss changing Effient to Plavix. Although less common than Brilinta, Effient may also cause some dyspnea.             Circulatory    Mobitz type 2 second degree atrioventricular block - Primary       Symptomatic 2:1 AV block consistent with Mobitz  2 block. She underwent implantation of Medtronic dual-chamber pacemaker on 11/18/2019.  Since the pacemaker implant she complained of palpitation, shortness of breath, nausea and dizziness with minimal exertion.  At her first follow up in the office pacemaker settings were adjusted. Her symptoms resolved with decreasing the AV interval in the settings. The paced AV interval was decreased from 260 ms to 180 ms and sensed AV interval was changed from 260ms to 160 ms. With the revised setting patient ambulated in the hallway without any discomfort. Since then she feels slight improvement in her SOB.          Relevant Orders    ECG 12 LEAD-OFFICE PERFORMED (Completed)       Other    Pacemaker     Medtronic Rach XT DR DDD  bpm. RV 0.25volts @ 0.4ms. Atrial threshold 0.75V @ 0.4ms. Atrial pacing 14.7%. Ventricular pacing 29.7%. Minor adjustments made to the AV delays.          Relevant Orders    ECG 12 LEAD-OFFICE PERFORMED (Completed)          Jermaine Carolina MD  12/16/2019

## 2019-12-09 NOTE — ASSESSMENT & PLAN NOTE
Symptomatic 2:1 AV block consistent with Mobitz 2 block. She underwent implantation of Medtronic dual-chamber pacemaker on 11/18/2019.  Since the pacemaker implant she complained of palpitation, shortness of breath, nausea and dizziness with minimal exertion.  At her first follow up in the office pacemaker settings were adjusted. Her symptoms resolved with decreasing the AV interval in the settings. The paced AV interval was decreased from 260 ms to 180 ms and sensed AV interval was changed from 260ms to 160 ms. With the revised setting patient ambulated in the hallway without any discomfort. Since then she feels slight improvement in her SOB.

## 2019-12-09 NOTE — ASSESSMENT & PLAN NOTE
CARDIAC CATH- 11/15/2019  Patent stents in the proximal and mid RCA, mid and distal LAD and proximal LCx into large OM1.  50% ostial LCx stenosis, iFR negative (1.0)   Normal left cardiac filling pressure, LVEDP 1 mmHg

## 2019-12-10 ENCOUNTER — OFFICE VISIT (OUTPATIENT)
Dept: CARDIOLOGY | Facility: CLINIC | Age: 74
End: 2019-12-10
Payer: MEDICARE

## 2019-12-10 VITALS
DIASTOLIC BLOOD PRESSURE: 76 MMHG | HEIGHT: 67 IN | HEART RATE: 68 BPM | SYSTOLIC BLOOD PRESSURE: 128 MMHG | RESPIRATION RATE: 16 BRPM | WEIGHT: 141.31 LBS | BODY MASS INDEX: 22.18 KG/M2

## 2019-12-10 DIAGNOSIS — R06.02 SOB (SHORTNESS OF BREATH): Primary | ICD-10-CM

## 2019-12-10 DIAGNOSIS — Z95.0 PACEMAKER: ICD-10-CM

## 2019-12-10 DIAGNOSIS — I44.1 MOBITZ TYPE 2 SECOND DEGREE ATRIOVENTRICULAR BLOCK: ICD-10-CM

## 2019-12-10 DIAGNOSIS — I25.119 CORONARY ARTERY DISEASE INVOLVING NATIVE CORONARY ARTERY OF NATIVE HEART WITH ANGINA PECTORIS (CMS/HCC): ICD-10-CM

## 2019-12-10 PROCEDURE — 93000 ELECTROCARDIOGRAM COMPLETE: CPT | Performed by: INTERNAL MEDICINE

## 2019-12-10 PROCEDURE — 99213 OFFICE O/P EST LOW 20 MIN: CPT | Mod: 24 | Performed by: INTERNAL MEDICINE

## 2019-12-10 ASSESSMENT — ENCOUNTER SYMPTOMS
ENDOCRINE NEGATIVE: 1
PALPITATIONS: 1
WEAKNESS: 1
SHORTNESS OF BREATH: 1
EYES NEGATIVE: 1
MUSCULOSKELETAL NEGATIVE: 1
GASTROINTESTINAL NEGATIVE: 1
DYSPNEA ON EXERTION: 1
COUGH: 1
PSYCHIATRIC NEGATIVE: 1

## 2019-12-10 NOTE — ASSESSMENT & PLAN NOTE
ECHOCARDIOGRAM 11/29/2019  Technically limited study no gross chamber enlargement  Imaging done just to exclude pericardial effusion Limited echo study  No regional wall motion abnormality preserved ejection fraction 55 to 60%  Aortic sclerosis  Mitral tricuspid valves appear normal  Prominent anterior fat pad no evidence of pericardial effusion or tamponade

## 2019-12-10 NOTE — ASSESSMENT & PLAN NOTE
Continued SOB mainly with exertion, in particular steps. Increase her,exercise, most likely deconditioning. Encouraged to begin daily mild exrecise. She believes it may be related to her allergies. She has had to stop her allergy meds since April. She will discuss with Dr Courtney at her January visit resuming some of her medications. At that time can also discuss changing Effient to Plavix. Although less common than Brilinta, Effient may also cause some dyspnea.

## 2019-12-10 NOTE — LETTER
December 16, 2019     Anitra Way MD  [address] [facility]  [place] [address]    Patient: Jennifer Sams  YOB: 1945  Date of Visit: 12/10/2019      Dear Dr. Way:    Thank you for referring Jennifer Sams to me for evaluation. Below are my notes for this consultation.    If you have questions, please do not hesitate to call me. I look forward to following your patient along with you.         Sincerely,        Jermaine Carolina MD        CC: MD Noé Marc MD Rothman, Steven A, MD  12/16/2019  8:17 AM  Signed       Electrophysiology  Outpatient Consult Note         HPI   Jennifer Sams is a 74 y.o. female who is seen today status post dual-chamber pacemaker on November 18, 2019 indicated for 2:1 AV block.  She returned to the office for follow-up of her pacemaker  with complaints of continued dyspnea. Minor adjustments were made to optimize AV timing, which resulted in mild improvement. She continues however with not feeling well, experiencing continued dyspnea and shortness of breath with exertion.   An echocardiogram shows no evidence of pericardial effusion.     Past Medical History:   Diagnosis Date   • AAA (abdominal aortic aneurysm) (CMS/[facility])    • Arthritis    • Elevated blood pressure reading without diagnosis of hypertension 4/19/2019   • GERD (gastroesophageal reflux disease) 4/19/2019   • Glaucoma 4/19/2019   • Heart murmur    • Hiatal hernia    • History of hip replacement, total, right 4/19/2019    Right hip 9/ 2016 and revision 2017    • History of rheumatic fever as a child 4/19/2019   • Hypercholesterolemia 4/19/2019   • Ischemic cardiomyopathy 5/9/2019   • Osteoarthritis of multiple joints 4/19/2019   • Osteoporosis    • Pacemaker 12/9/2019   • Raynaud's disease 4/19/2019   • RSD (reflex sympathetic dystrophy) 4/19/2019    Of left foot   • SOB (shortness of breath) 12/10/2019   • Status post insertion of drug eluting coronary artery stent 5/9/2019       Past  Surgical History:   Procedure Laterality Date   • CHOLECYSTECTOMY OPEN     • CORONARY ANGIOPLASTY WITH STENT PLACEMENT  04/29/2019    of LAD   • CORONARY ANGIOPLASTY WITH STENT PLACEMENT  04/30/2019    of RCA   • DILATION AND CURETTAGE OF UTERUS     • EYE SURGERY      RIGHT CATARACT   • FOOT SURGERY Left    • JOINT REPLACEMENT Right 09/2016    total hip   • REVISION TOTAL HIP ARTHROPLASTY Right 2017   • TONSILLECTOMY         Allergies: Ace inhibitors; Atorvastatin; Brilinta [ticagrelor]; Codeine; Dilaudid [hydromorphone]; Onion; Oxycodone; and Morphine sulfate    Current Outpatient Medications   Medication Sig Dispense Refill   • aspirin (ASPIR-81) 81 mg enteric coated tablet Take 1 tablet (81 mg total) by mouth daily. 90 tablet 5   • nitroglycerin (NITROSTAT) 0.4 mg SL tablet Place 1 tablet (0.4 mg total) under the tongue every 5 (five) minutes as needed for chest pain. 25 tablet 4   • pantoprazole (PROTONIX) 40 mg EC tablet Take 40 mg by mouth daily.     • prasugrel (EFFIENT) 10 mg tablet Take 1 tablet (10 mg total) by mouth daily. 90 tablet 4   • rosuvastatin (CRESTOR) 20 mg tablet Take 1 tablet (20 mg total) by mouth daily. 90 tablet 4     No current facility-administered medications for this visit.        Social History     Tobacco Use   • Smoking status: Never Smoker   • Smokeless tobacco: Never Used   Substance Use Topics   • Alcohol use: Yes     Comment: VERY RARE   • Drug use: No       Family History   Problem Relation Age of Onset   • Congenital heart disease Biological Mother         noted at autopsy   • Heart attack Paternal Grandfather        Review of Systems   Constitution: Positive for malaise/fatigue.   HENT: Positive for congestion.    Eyes: Negative.    Cardiovascular: Positive for dyspnea on exertion and palpitations.   Respiratory: Positive for cough and shortness of breath.    Endocrine: Negative.    Hematologic/Lymphatic: Negative for bleeding problem.   Skin: Negative.    Musculoskeletal:  Negative.    Gastrointestinal: Negative.    Genitourinary: Negative.    Neurological: Positive for weakness.   Psychiatric/Behavioral: Negative.        Objective     Vitals:    12/10/19 0958   BP: 128/76   Pulse: 68   Resp: 16       Physical Exam   Constitutional: She is oriented to person, place, and time. She appears well-developed and well-nourished.   HENT:   Head: Normocephalic and atraumatic.   Eyes: Conjunctivae are normal.   Neck: Normal range of motion. Neck supple.   Cardiovascular: Normal rate, regular rhythm and normal heart sounds.   No murmur heard.  Pulmonary/Chest: Effort normal and breath sounds normal.   Abdominal: Soft. Bowel sounds are normal.   Musculoskeletal: Normal range of motion. She exhibits no edema.   Neurological: She is alert and oriented to person, place, and time. She has normal reflexes.   Skin: Skin is warm and dry.   Psychiatric: She has a normal mood and affect. Her behavior is normal. Judgment and thought content normal.        Labs   Lab Results   Component Value Date    WBC 6.23 11/19/2019    HGB 11.7 (L) 11/19/2019    HCT 35.8 11/19/2019     11/19/2019    CHOL 159 11/12/2019    TRIG 71 11/12/2019    HDL 66 11/12/2019    ALT 18 11/16/2019    AST 25 11/16/2019     11/19/2019    K 4.6 11/19/2019     (H) 11/19/2019    CREATININE 0.9 11/19/2019    BUN 12 11/19/2019    CO2 21 (L) 11/19/2019    TSH 2.75 11/15/2019    INR 1.0 11/18/2019     BP Readings from Last 3 Encounters:   12/10/19 128/76   11/26/19 128/80   11/19/19 (!) 147/66     PACEMAKER IMPLANTATION- 11/18/2019  Implanted Hardware:   The pacemaker generator is a MIGSIF, model W1 DR 01, serial #[id number].   The atrial pacing electrode is a Medtronic, model #5076, the serial #[id number].   The right ventricular pacing electrode is a Medtronic, model #5076, the serial #[id number].     Intraoperative Testing:   Right atrium: intrinsic amplitude 2.2 mV, impedance 1025 ohms, pacing threshold 0.8 V @0.5  msec.   Right ventricle: intrinsic amplitude 9.6 mV, impedance 864 ohms, pacing threshold 0.4 V @0.5 msec.    CARDIAC CATH- 11/15/2019  Patent stents in the proximal and mid RCA, mid and distal LAD and proximal LCx into large OM1.  50% ostial LCx stenosis, iFR negative (1.0)   Normal left cardiac filling pressure, LVEDP 1 mmHg    CV NM EXERCISE STRESS 11/13/2019  Exercise technetium tetrofosmin was terminated at 2 minutes due to hypotension (BP dropped from 110/80 to 80/60 mmHg) and bradycardia (HR dropped from 97 to 57 bpm). Patient was markedly short of breath during exercise. Perfusion was normal at the workload achieved but the test is nondiagnostic due to poor exercise tolerance and inability to achieve target heart rate.    ECG is non-diagnostic due to not achieving the target heart rate.  Gated imaging reveals normal wall motion with hyperdynamic left ventricular systolic function.  The results were discussed with the consulting cardiology fellow.     ECHOCARDIOGRAM 11/29/2019  Technically limited study no gross chamber enlargement  Imaging done just to exclude pericardial effusion Limited echo study  No regional wall motion abnormality preserved ejection fraction 55 to 60%  Aortic sclerosis  Mitral tricuspid valves appear normal  Prominent anterior fat pad no evidence of pericardial effusion or tamponade    ECG Atrial sense and ventricular fusing    Assessment/Plan   Problem List Items Addressed This Visit        Nervous    Coronary artery disease involving native coronary artery of native heart with angina pectoris (CMS/HCC)     CARDIAC CATH- 11/15/2019  Patent stents in the proximal and mid RCA, mid and distal LAD and proximal LCx into large OM1.  50% ostial LCx stenosis, iFR negative (1.0)   Normal left cardiac filling pressure, LVEDP 1 mmHg         Relevant Orders    ECG 12 LEAD-OFFICE PERFORMED (Completed)       Respiratory    SOB (shortness of breath)     Continued SOB mainly with exertion, in  particular steps. Increase her,exercise, most likely deconditioning. Encouraged to begin daily mild exrecise. She believes it may be related to her allergies. She has had to stop her allergy meds since April. She will discuss with Dr Courtney at her January visit resuming some of her medications. At that time can also discuss changing Effient to Plavix. Although less common than Brilinta, Effient may also cause some dyspnea.             Circulatory    Mobitz type 2 second degree atrioventricular block - Primary       Symptomatic 2:1 AV block consistent with Mobitz 2 block. She underwent implantation of Medtronic dual-chamber pacemaker on 11/18/2019.  Since the pacemaker implant she complained of palpitation, shortness of breath, nausea and dizziness with minimal exertion.   At her first follow up in the office pacemaker settings were adjusted. Her symptoms resolved with decreasing the AV interval in the settings. The paced AV interval was decreased from 260 ms to 180 ms and sensed AV interval was changed from 260ms to 160 ms. With the revised setting patient ambulated in the hallway without any discomfort. Since then she feels slight improvement in her SOB.          Relevant Orders    ECG 12 LEAD-OFFICE PERFORMED (Completed)       Other    Pacemaker     Medtronic [place] XT DR DDD  bpm. RV 0.25volts @ 0.4ms. Atrial threshold 0.75V @ 0.4ms. Atrial pacing 14.7%. Ventricular pacing 29.7%. Minor adjustments made to the AV delays.          Relevant Orders    ECG 12 LEAD-OFFICE PERFORMED (Completed)          Jermaine Carolina MD  12/16/2019

## 2019-12-10 NOTE — ASSESSMENT & PLAN NOTE
Medtronic [place] XT DR DDD  bpm. RV 0.25volts @ 0.4ms. Atrial threshold 0.75V @ 0.4ms. Atrial pacing 14.7%. Ventricular pacing 29.7%. Minor adjustments made to the AV delays.

## 2019-12-31 LAB
ALBUMIN SERPL-MCNC: 4.2 G/DL (ref 3.6–5.1)
ALBUMIN/GLOB SERPL: 1.8 (CALC) (ref 1–2.5)
ALP SERPL-CCNC: 85 U/L (ref 33–130)
ALT SERPL-CCNC: 10 U/L (ref 6–29)
AST SERPL-CCNC: 19 U/L (ref 10–35)
BILIRUB SERPL-MCNC: 0.6 MG/DL (ref 0.2–1.2)
BUN SERPL-MCNC: 13 MG/DL (ref 7–25)
BUN/CREAT SERPL: ABNORMAL (CALC) (ref 6–22)
CALCIUM SERPL-MCNC: 9.4 MG/DL (ref 8.6–10.4)
CHLORIDE SERPL-SCNC: 108 MMOL/L (ref 98–110)
CHOLEST SERPL-MCNC: 161 MG/DL
CHOLEST/HDLC SERPL: 2.4 (CALC)
CK SERPL-CCNC: 32 U/L (ref 29–143)
CO2 SERPL-SCNC: 23 MMOL/L (ref 20–32)
CREAT SERPL-MCNC: 0.93 MG/DL (ref 0.6–0.93)
GLOBULIN SER CALC-MCNC: 2.4 G/DL (CALC) (ref 1.9–3.7)
GLUCOSE SERPL-MCNC: 110 MG/DL (ref 65–99)
HDLC SERPL-MCNC: 68 MG/DL
LDLC SERPL CALC-MCNC: 71 MG/DL (CALC)
NONHDLC SERPL-MCNC: 93 MG/DL (CALC)
POTASSIUM SERPL-SCNC: 3.8 MMOL/L (ref 3.5–5.3)
PROT SERPL-MCNC: 6.6 G/DL (ref 6.1–8.1)
QUEST EGFR NON-AFR. AMERICAN: 61 ML/MIN/1.73M2
SODIUM SERPL-SCNC: 142 MMOL/L (ref 135–146)
TRIGL SERPL-MCNC: 138 MG/DL

## 2020-01-06 ENCOUNTER — OFFICE VISIT (OUTPATIENT)
Dept: CARDIOLOGY | Facility: CLINIC | Age: 75
End: 2020-01-06
Payer: MEDICARE

## 2020-01-06 VITALS
RESPIRATION RATE: 15 BRPM | HEIGHT: 67 IN | WEIGHT: 143 LBS | HEART RATE: 68 BPM | BODY MASS INDEX: 22.44 KG/M2 | DIASTOLIC BLOOD PRESSURE: 74 MMHG | SYSTOLIC BLOOD PRESSURE: 122 MMHG

## 2020-01-06 DIAGNOSIS — I21.4 NSTEMI (NON-ST ELEVATED MYOCARDIAL INFARCTION) (CMS/HCC): ICD-10-CM

## 2020-01-06 DIAGNOSIS — I25.119 CORONARY ARTERY DISEASE INVOLVING NATIVE CORONARY ARTERY OF NATIVE HEART WITH ANGINA PECTORIS (CMS/HCC): ICD-10-CM

## 2020-01-06 DIAGNOSIS — I25.5 ISCHEMIC CARDIOMYOPATHY: Primary | ICD-10-CM

## 2020-01-06 DIAGNOSIS — Z95.0 PACEMAKER: ICD-10-CM

## 2020-01-06 DIAGNOSIS — I44.1 MOBITZ TYPE 2 SECOND DEGREE ATRIOVENTRICULAR BLOCK: ICD-10-CM

## 2020-01-06 DIAGNOSIS — Z95.5 STATUS POST INSERTION OF DRUG ELUTING CORONARY ARTERY STENT: ICD-10-CM

## 2020-01-06 DIAGNOSIS — R06.02 SOB (SHORTNESS OF BREATH): ICD-10-CM

## 2020-01-06 DIAGNOSIS — E78.00 HYPERCHOLESTEROLEMIA: ICD-10-CM

## 2020-01-06 PROBLEM — R04.0 EPISTAXIS: Status: ACTIVE | Noted: 2020-01-06

## 2020-01-06 PROCEDURE — 93000 ELECTROCARDIOGRAM COMPLETE: CPT | Performed by: INTERNAL MEDICINE

## 2020-01-06 PROCEDURE — 99214 OFFICE O/P EST MOD 30 MIN: CPT | Performed by: INTERNAL MEDICINE

## 2020-01-06 ASSESSMENT — ENCOUNTER SYMPTOMS
HEMATURIA: 0
BRUISES/BLEEDS EASILY: 0
WHEEZING: 0
FATIGUE: 1
ABDOMINAL PAIN: 0
LIGHT-HEADEDNESS: 0
FACIAL ASYMMETRY: 0
ABDOMINAL DISTENTION: 0
ARTHRALGIAS: 1
ANAL BLEEDING: 0
SPEECH DIFFICULTY: 0
COUGH: 1
FEVER: 0
CHILLS: 0
APNEA: 0
BLOOD IN STOOL: 0
PALPITATIONS: 0
MYALGIAS: 0
SHORTNESS OF BREATH: 1
DIZZINESS: 0

## 2020-01-06 NOTE — LETTER
Tomasz Courtney MD, [facility]    Director, Clinical Cardiology-[facility] and  [facility]    The Neno [employer]ela [employer] Chair in Innovative Cardiology    Clinical Associate Professor of Medicine  [facility]  [facility]  [facility]  [address]  [place] [address]    TEL  [phone]  FAX: [phone]  EMAIL: [email or web address]   January 6, 2020      Anitra Way MD  [address] [place] [address]      Noé Moncada MD  Inteventional Cardiology  [facility]    Jermaine Carolina MD  Electrophysiology  [facility] ``D`` Section  [facility]        Jennifer EUGENE Latrell  1945      Dear Colleagues,      I saw Jennifer EUGENE Latrell in my office today on January 6, 2020.    She is a 74 y.o. female with a history of exertional angina, non-STEMI 4/26/19, three-vessel CAD; status post FRANK x 2 LAD 4/27/19 + FRANK x 4 RCA 4/29/19 + FRANK x 1 LCX OM2 5/2/19, hypercholesterolemia with a high HDL cholesterol, 2:1 AV block status post pacemaker implantation (Medtronic [place] XT DR DDD11/18/19), and osteoarthritis of her hips who presents today for her 4 month follow-up visit.    As you recall, she was hospitalized at [facility] from 11/12 through 11/19/19 after presenting with chest pressure and exertional dyspnea. Cardiac biomarkers were negative.  An exercise stress test was attempted on 11/13/19 but was terminated prematurely due to a fall in her blood pressure to 80/60 a few minutes into the study.  Myocardial perfusion imaging was normal on that limited study  She subsequently underwent coronary angiography which revealed patency of the stents in her proximal and mid right coronary artery, mid and distal LAD, and proximal left circumflex into large OM1.  There was a 50% ostial left  circumflex stenosis which was iFR negative (1.0).  Her LVEDP of 1 was consistent with hypovolemia.  She developed 2:1 AV block during telemetric monitoring while ambulating prompting the recommendation that a pacemaker be implanted.  That procedure was performed by Dr. Carolina on 11/18/19.  She had undergone an echocardiogram on 11/29/19 which revealed no evidence of a pericardial effusion.  Her LV systolic function was normal with an LV ejection fraction of 55 to 60%.    She continued to report dyspnea and shortness of breath with exertion when she was seen in follow-up by Dr. Carolina on 12/10/19.  Adjustments were made in her pacemaker on 2 occasions to optimize AV timing which has resulted in improvement in her exercise tolerance.  She is more compromised by pain in her right hip which has been replaced on 2 occasions than she is my breathlessness    Her exercise had consisted of walking for 30 minutes in a mall but has curtailed it since she developed bronchitis and her right hip pain has flared.    Her beta-blocker has been discontinued.    She still is troubled by episodic mild epistaxis which she attributes to her allergies.  She understands need to avoid pseudoephedrine containing medications.    She continues to experience easy bruisability on her dual antiplatelet therapy but has had no melena, hematuria, or rectal bleeding.    She has lost 1 pound since her last visit.    She has been taking her rosuvastatin 20 mg a day regularly without side effects such as myalgias or significant muscle cramping.     ALLERGIES:  Allergies   Allergen Reactions   • Ace Inhibitors Other (see comments)     cough   • Atorvastatin      Nausea and can'trecall   • Brilinta [Ticagrelor]      SOB   • Codeine      Nausea and Vomiting   • Dilaudid [Hydromorphone]      Nausea & vomiting   • Onion      Severe abdominal pain   • Oxycodone      Nausea & vomiting, dizziness   • Morphine Sulfate Nausea Only and GI intolerance        I  "have reviewed the problem list, allergies, medications and social history, and they are up to date in the electronic record as of the current encounter.     ROS: She reports a persistent cough subsequent to her bronchitis as well as due to her allergies.  She also has disordered sleep which may be a consequence of her emotional state following the death of her former employer with whom she was quite close.  The remainder of the review of systems is otherwise negative in detail for significant complaints.    The aforementioned in her medications have been made since the last visit.    PHYSICAL EXAM:  Visit Vitals  /74 (BP Location: Right upper arm, Patient Position: Sitting)   Pulse 68   Resp 15   Ht 1.702 m (5' 7\")   Wt 64.9 kg (143 lb)   BMI 22.40 kg/m²     General: well-developed, well-nourished, appears well, no acute distress  HEENT: sclera anicteric, conjunctiva pink, no xanthelasma, neck supple, no thyromegaly  Chest: Well-healed pacemaker generator incision left upper chest wall, clear to auscultation, symmetrical expansion, no scoliosis   Heart: Apical impulse normally situated, regular rhythm, normal S1, normal S2, no gallops, no murmur  JVP: normal jugular venous pressure, negative AJR  Vascular: carotid pulses: intact bilaterally, no bruit, peripheral pulses: intact bilaterally  Abd: soft, non-tender, no hepatosplenomegaly, abdominal aorta not palpable  Ext: Small ecchymosis right shin, no edema, no clubbing, no cyanosis  Skin: warm, dry, no ecchymoses  Neuro: alert, oriented x 3, normal muscle strength  Psychiatric: normal affect, normal judgment, normal insight and normal memory      LABS:     Ref. Range 12/30/2019 12:59   Sodium Latest Ref Range: 135 - 146 mmol/L 142   Potassium Latest Ref Range: 3.5 - 5.3 mmol/L 3.8   Chloride Latest Ref Range: 98 - 110 mmol/L 108   CO2 Latest Ref Range: 20 - 32 mmol/L 23   BUN Latest Ref Range: 7 - 25 mg/dL 13   Creatinine Latest Ref Range: 0.60 - 0.93 mg/dL " 0.93   Glucose Latest Ref Range: 65 - 99 mg/dL 110 (H)   Calcium Latest Ref Range: 8.6 - 10.4 mg/dL 9.4   AST (SGOT) Latest Ref Range: 10 - 35 U/L 19   ALT (SGPT) Latest Ref Range: 6 - 29 U/L 10   Alkaline Phosphatase Latest Ref Range: 33 - 130 U/L 85   Total Protein Latest Ref Range: 6.1 - 8.1 g/dL 6.6   Albumin Latest Ref Range: 3.6 - 5.1 g/dL 4.2   Bilirubin, Total Latest Ref Range: 0.2 - 1.2 mg/dL 0.6   eGFR Latest Ref Range: > OR = 60 mL/min/1.73m2 61   Bun/Creatinine Ratio Latest Ref Range: 6 - 22 (calc) NOT APPLICABLE   Cholesterol Latest Ref Range: <200 mg/dL 161   Triglycerides Latest Ref Range: <150 mg/dL 138   HDL Latest Ref Range: >50 mg/dL 68   LDL Calculated Latest Units: mg/dL (calc) 71   Non-HDL, Calculated Latest Ref Range: <130 mg/dL (calc) 93   Chol/Hdlc Ratio Latest Ref Range: <5.0 (calc) 2.4   ALBUMIN/GLOBULIN RATIO Latest Ref Range: 1.0 - 2.5 (calc) 1.8   Creatine Kinase, Total Latest Ref Range: 29 - 143 U/L 32   Globulin Latest Ref Range: 1.9 - 3.7 g/dL (calc) 2.4       ECG:      IMPRESSION & PLAN:  Non-STEMI 4/26/19; three-vessel CAD; status post FRANK x2 LAD 4/27/19; DESx3 RCA 4/29/19; FRANK x1 LCX OM2; patent stents and no new lesions 11/19:  She reports no recurrence of her anginal symptoms.  Tolerating dual antiplatelet therapy with anticipated easy bruisability.  She will continue dual antiplatelet therapy at least through May 1, 2020.    2:1 AV block; status post dual-chamber pacemaker 11/18/19:  This was the likely cause of her exertional dyspnea which seems to have improved with adjustment in her pacing parameters.  Her LVEDP was 1 at the time of her catheterization excluding elevated filling pressures as a contributing cause.    History of ischemic cardiomyopathy; resolved post revascularization (LVEF =50 to 55% 6/19):  Improvement in her LVEF confirmed that significant myocardial hibernation has resolved with revascularization.     Hypercholesterolemia:  She continues to tolerate her  lipid lowering medications without significant side effects or hepatotoxicity. Her lipids are at goal. Her liver function studies and CPK are normal.  I will continue her present lipid lowering regimen and reassess her lab studies in 6 months.    No changes were made in her regimen today.  Listed below you will find the regimen that she will be requested to continue:      Current Outpatient Medications:   •  aspirin (ASPIR-81) 81 mg enteric coated tablet, Take 1 tablet (81 mg total) by mouth daily., Disp: 90 tablet, Rfl: 5  •  nitroglycerin (NITROSTAT) 0.4 mg SL tablet, Place 1 tablet (0.4 mg total) under the tongue every 5 (five) minutes as needed for chest pain., Disp: 25 tablet, Rfl: 4  •  pantoprazole (PROTONIX) 40 mg EC tablet, Take 40 mg by mouth daily., Disp: , Rfl:   •  prasugrel (EFFIENT) 10 mg tablet, Take 1 tablet (10 mg total) by mouth daily., Disp: 90 tablet, Rfl: 4  •  rosuvastatin (CRESTOR) 20 mg tablet, Take 1 tablet (20 mg total) by mouth daily., Disp: 90 tablet, Rfl: 4    If there are no new interval cardiovascular problems, I will see her again in 6 months.    I sincerely appreciate the opportunity to participate in the care of your patients. Please do not hesitate to contact me if any questions or problems arise.     With best wishes and warmest personal regards.      Sincerely,    Tomasz Courtney MD, [facility]    This document was generated utilizing voice recognition technology. A reasonable attempt at proofreading has been made to minimize errors but please excuse any typographical errors which may be present. Please call with any questions.

## 2020-01-06 NOTE — PROGRESS NOTES
"Subjective      Patient ID: Jennifer Sams is a 74 y.o. female.  1945      Her exercise consists of walking for 30 minutes in a mall but had to stop it when she developed bronchitis and right hip pain.      The following have been reviewed and updated as appropriate in this visit:  Tobacco  Allergies  Meds  Surg Hx  Fam Hx  Soc Hx      Review of Systems   Constitutional: Positive for fatigue (from difficulty sleeping). Negative for chills and fever.        Weight decreased 1 lb since her last visit   HENT: Positive for nosebleeds (can last 6-8 hours at a time, mostlyfrom right side but occasionally from left nares).    Respiratory: Positive for cough (all the time, since off allergy meds with MI) and shortness of breath (with climbing steps, resolves after a few minutes, has improved with most recent adjustment in pacemaker). Negative for apnea and wheezing.         Told of occasional snoring   Cardiovascular: Negative for chest pain, palpitations and leg swelling.        No orthopnea or PND   Gastrointestinal: Negative for abdominal distention, abdominal pain, anal bleeding and blood in stool.   Genitourinary: Negative for hematuria.   Musculoskeletal: Positive for arthralgias (right hip pain with radiation across lower back, occurs with walking and resolves with rest). Negative for myalgias.   Neurological: Negative for dizziness, syncope, facial asymmetry, speech difficulty and light-headedness.   Hematological: Does not bruise/bleed easily.       Objective     Vitals:    01/06/20 1248   Pulse: 68   Resp: 15   Weight: 64.9 kg (143 lb)   Height: 1.702 m (5' 7\")     Body mass index is 22.4 kg/m².    Physical Exam    Assessment/Plan   Diagnoses and all orders for this visit:    Ischemic cardiomyopathy (Primary)  -     ECG 12 lead        "

## 2020-01-06 NOTE — PROGRESS NOTES
Tomasz Courtney MD, [facility]    Director, Clinical Cardiology-[facility] and  [facility]    The Neno Canela III Chair in Innovative Cardiology    Clinical Associate Professor of Medicine  [facility]  [facility]  [facility]  [facility]  [address]  [place] [address]    TEL  [phone]  FAX: [phone]  EMAIL: [email or web address]   January 6, 2020      Anitra Way MD  [address] [place] [address]      Noé Moncada MD  Inteventional Cardiology  [facility]    Jermaine Carolina MD  Electrophysiology  [facility], ``D`` Section  [facility]        Jennifer EUGENE Latrell  1945      Dear Colleagues,      I saw Jennifer EUGENE Latrell in my office today on January 6, 2020.    She is a 74 y.o. 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    As you recall, she was hospitalized at [facility] from 11/12 through 11/19/19 after presenting with chest pressure and exertional dyspnea. Cardiac biomarkers were negative.  An exercise stress test was attempted on 11/13/19 but was terminated prematurely due to a fall in her blood pressure to 80/60 a few minutes into the study.  Myocardial perfusion imaging was normal on that limited study  She subsequently underwent coronary angiography which revealed patency of the stents in her proximal and mid right coronary artery, mid and distal LAD, and proximal left circumflex into large OM1.  There was a 50% ostial left  circumflex stenosis which was iFR negative (1.0).  Her LVEDP of 1 was consistent with hypovolemia.  She developed 2:1 AV block during telemetric monitoring while ambulating prompting the recommendation that a pacemaker be implanted.  That procedure was performed by Dr. Carolina on 11/18/19.  She had undergone an echocardiogram on 11/29/19 which revealed no evidence of a pericardial effusion.  Her LV systolic function was normal with an LV ejection fraction of 55 to 60%.    She continued to report dyspnea and shortness of breath with exertion when she was seen in follow-up by Dr. Carolina on 12/10/19.  Adjustments were made in her pacemaker on 2 occasions to optimize AV timing which has resulted in improvement in her exercise tolerance.  She is more compromised by pain in her right hip which has been replaced on 2 occasions than she is my breathlessness    Her exercise had consisted of walking for 30 minutes in a mall but has curtailed it since she developed bronchitis and her right hip pain has flared.    Her beta-blocker has been discontinued.    She still is troubled by episodic mild epistaxis which she attributes to her allergies.  She understands need to avoid pseudoephedrine containing medications.    She continues to experience easy bruisability on her dual antiplatelet therapy but has had no melena, hematuria, or rectal bleeding.    She has lost 1 pound since her last visit.    She has been taking her rosuvastatin 20 mg a day regularly without side effects such as myalgias or significant muscle cramping.     ALLERGIES:  Allergies   Allergen Reactions   • Ace Inhibitors Other (see comments)     cough   • Atorvastatin      Nausea and can'trecall   • Brilinta [Ticagrelor]      SOB   • Codeine      Nausea and Vomiting   • Dilaudid [Hydromorphone]      Nausea & vomiting   • Onion      Severe abdominal pain   • Oxycodone      Nausea & vomiting, dizziness   • Morphine Sulfate Nausea Only and GI intolerance        I  "have reviewed the problem list, allergies, medications and social history, and they are up to date in the electronic record as of the current encounter.     ROS: She reports a persistent cough subsequent to her bronchitis as well as due to her allergies.  She also has disordered sleep which may be a consequence of her emotional state following the death of her former employer with whom she was quite close.  The remainder of the review of systems is otherwise negative in detail for significant complaints.    The aforementioned in her medications have been made since the last visit.    PHYSICAL EXAM:  Visit Vitals  /74 (BP Location: Right upper arm, Patient Position: Sitting)   Pulse 68   Resp 15   Ht 1.702 m (5' 7\")   Wt 64.9 kg (143 lb)   BMI 22.40 kg/m²     General: well-developed, well-nourished, appears well, no acute distress  HEENT: sclera anicteric, conjunctiva pink, no xanthelasma, neck supple, no thyromegaly  Chest: Well-healed pacemaker generator incision left upper chest wall, clear to auscultation, symmetrical expansion, no scoliosis   Heart: Apical impulse normally situated, regular rhythm, normal S1, normal S2, no gallops, no murmur  JVP: normal jugular venous pressure, negative AJR  Vascular: carotid pulses: intact bilaterally, no bruit, peripheral pulses: intact bilaterally  Abd: soft, non-tender, no hepatosplenomegaly, abdominal aorta not palpable  Ext: Small ecchymosis right shin, no edema, no clubbing, no cyanosis  Skin: warm, dry, no ecchymoses  Neuro: alert, oriented x 3, normal muscle strength  Psychiatric: normal affect, normal judgment, normal insight and normal memory      LABS:     Ref. Range 12/30/2019 12:59   Sodium Latest Ref Range: 135 - 146 mmol/L 142   Potassium Latest Ref Range: 3.5 - 5.3 mmol/L 3.8   Chloride Latest Ref Range: 98 - 110 mmol/L 108   CO2 Latest Ref Range: 20 - 32 mmol/L 23   BUN Latest Ref Range: 7 - 25 mg/dL 13   Creatinine Latest Ref Range: 0.60 - 0.93 mg/dL " 0.93   Glucose Latest Ref Range: 65 - 99 mg/dL 110 (H)   Calcium Latest Ref Range: 8.6 - 10.4 mg/dL 9.4   AST (SGOT) Latest Ref Range: 10 - 35 U/L 19   ALT (SGPT) Latest Ref Range: 6 - 29 U/L 10   Alkaline Phosphatase Latest Ref Range: 33 - 130 U/L 85   Total Protein Latest Ref Range: 6.1 - 8.1 g/dL 6.6   Albumin Latest Ref Range: 3.6 - 5.1 g/dL 4.2   Bilirubin, Total Latest Ref Range: 0.2 - 1.2 mg/dL 0.6   eGFR Latest Ref Range: > OR = 60 mL/min/1.73m2 61   Bun/Creatinine Ratio Latest Ref Range: 6 - 22 (calc) NOT APPLICABLE   Cholesterol Latest Ref Range: <200 mg/dL 161   Triglycerides Latest Ref Range: <150 mg/dL 138   HDL Latest Ref Range: >50 mg/dL 68   LDL Calculated Latest Units: mg/dL (calc) 71   Non-HDL, Calculated Latest Ref Range: <130 mg/dL (calc) 93   Chol/Hdlc Ratio Latest Ref Range: <5.0 (calc) 2.4   ALBUMIN/GLOBULIN RATIO Latest Ref Range: 1.0 - 2.5 (calc) 1.8   Creatine Kinase, Total Latest Ref Range: 29 - 143 U/L 32   Globulin Latest Ref Range: 1.9 - 3.7 g/dL (calc) 2.4       ECG:      IMPRESSION & PLAN:  Non-STEMI 4/26/19; three-vessel CAD; status post FRANK x2 LAD 4/27/19; DESx3 RCA 4/29/19; FRANK x1 LCX OM2; patent stents and no new lesions 11/19:  She reports no recurrence of her anginal symptoms.  Tolerating dual antiplatelet therapy with anticipated easy bruisability.  She will continue dual antiplatelet therapy at least through May 1, 2020.    2:1 AV block; status post dual-chamber pacemaker 11/18/19:  This was the likely cause of her exertional dyspnea which seems to have improved with adjustment in her pacing parameters.  Her LVEDP was 1 at the time of her catheterization excluding elevated filling pressures as a contributing cause.    History of ischemic cardiomyopathy; resolved post revascularization (LVEF =50 to 55% 6/19):  Improvement in her LVEF confirmed that significant myocardial hibernation has resolved with revascularization.     Hypercholesterolemia:  She continues to tolerate her  lipid lowering medications without significant side effects or hepatotoxicity. Her lipids are at goal. Her liver function studies and CPK are normal.  I will continue her present lipid lowering regimen and reassess her lab studies in 6 months.    No changes were made in her regimen today.  Listed below you will find the regimen that she will be requested to continue:      Current Outpatient Medications:   •  aspirin (ASPIR-81) 81 mg enteric coated tablet, Take 1 tablet (81 mg total) by mouth daily., Disp: 90 tablet, Rfl: 5  •  nitroglycerin (NITROSTAT) 0.4 mg SL tablet, Place 1 tablet (0.4 mg total) under the tongue every 5 (five) minutes as needed for chest pain., Disp: 25 tablet, Rfl: 4  •  pantoprazole (PROTONIX) 40 mg EC tablet, Take 40 mg by mouth daily., Disp: , Rfl:   •  prasugrel (EFFIENT) 10 mg tablet, Take 1 tablet (10 mg total) by mouth daily., Disp: 90 tablet, Rfl: 4  •  rosuvastatin (CRESTOR) 20 mg tablet, Take 1 tablet (20 mg total) by mouth daily., Disp: 90 tablet, Rfl: 4    If there are no new interval cardiovascular problems, I will see her again in 6 months.    I sincerely appreciate the opportunity to participate in the care of your patients. Please do not hesitate to contact me if any questions or problems arise.     With best wishes and warmest personal regards.      Sincerely,    Tomasz Courtney MD, [facility]    This document was generated utilizing voice recognition technology. A reasonable attempt at proofreading has been made to minimize errors but please excuse any typographical errors which may be present. Please call with any questions.

## 2020-01-17 ENCOUNTER — HOSPITAL ENCOUNTER (EMERGENCY)
Facility: HOSPITAL | Age: 75
Discharge: HOME | End: 2020-01-17
Attending: EMERGENCY MEDICINE
Payer: MEDICARE

## 2020-01-17 ENCOUNTER — APPOINTMENT (EMERGENCY)
Dept: RADIOLOGY | Facility: HOSPITAL | Age: 75
End: 2020-01-17
Payer: MEDICARE

## 2020-01-17 VITALS
DIASTOLIC BLOOD PRESSURE: 75 MMHG | OXYGEN SATURATION: 99 % | TEMPERATURE: 97.9 F | HEART RATE: 70 BPM | BODY MASS INDEX: 22.44 KG/M2 | WEIGHT: 143 LBS | SYSTOLIC BLOOD PRESSURE: 146 MMHG | RESPIRATION RATE: 18 BRPM | HEIGHT: 67 IN

## 2020-01-17 DIAGNOSIS — R05.9 COUGH: Primary | ICD-10-CM

## 2020-01-17 LAB
ALBUMIN SERPL-MCNC: 4.4 G/DL (ref 3.4–5)
ALP SERPL-CCNC: 75 IU/L (ref 35–126)
ALT SERPL-CCNC: 18 IU/L (ref 11–54)
ANION GAP SERPL CALC-SCNC: 13 MEQ/L (ref 3–15)
AST SERPL-CCNC: 29 IU/L (ref 15–41)
BASOPHILS # BLD: 0.03 K/UL (ref 0.01–0.1)
BASOPHILS NFR BLD: 0.5 %
BILIRUB SERPL-MCNC: 0.6 MG/DL (ref 0.3–1.2)
BNP SERPL-MCNC: 48 PG/ML
BUN SERPL-MCNC: 15 MG/DL (ref 8–20)
CALCIUM SERPL-MCNC: 9.2 MG/DL (ref 8.9–10.3)
CHLORIDE SERPL-SCNC: 106 MEQ/L (ref 98–109)
CO2 SERPL-SCNC: 21 MEQ/L (ref 22–32)
CREAT SERPL-MCNC: 0.9 MG/DL
DIFFERENTIAL METHOD BLD: ABNORMAL
EOSINOPHIL # BLD: 0.12 K/UL (ref 0.04–0.36)
EOSINOPHIL NFR BLD: 1.8 %
ERYTHROCYTE [DISTWIDTH] IN BLOOD BY AUTOMATED COUNT: 12.6 % (ref 11.7–14.4)
GFR SERPL CREATININE-BSD FRML MDRD: >60 ML/MIN/1.73M*2
GLUCOSE SERPL-MCNC: 105 MG/DL (ref 70–99)
HCT VFR BLDCO AUTO: 42.4 %
HGB BLD-MCNC: 13.3 G/DL (ref 11.8–15.7)
IMM GRANULOCYTES # BLD AUTO: 0.01 K/UL (ref 0–0.08)
IMM GRANULOCYTES NFR BLD AUTO: 0.2 %
LYMPHOCYTES # BLD: 1.61 K/UL (ref 1.2–3.5)
LYMPHOCYTES NFR BLD: 24.4 %
MAGNESIUM SERPL-MCNC: 2.2 MG/DL (ref 1.8–2.5)
MCH RBC QN AUTO: 28.1 PG (ref 28–33.2)
MCHC RBC AUTO-ENTMCNC: 31.4 G/DL (ref 32.2–35.5)
MCV RBC AUTO: 89.6 FL (ref 83–98)
MONOCYTES # BLD: 0.54 K/UL (ref 0.28–0.8)
MONOCYTES NFR BLD: 8.2 %
NEUTROPHILS # BLD: 4.29 K/UL (ref 1.7–7)
NEUTS SEG NFR BLD: 64.9 %
NRBC BLD-RTO: 0 %
PDW BLD AUTO: 8.8 FL (ref 9.4–12.3)
PLATELET # BLD AUTO: 220 K/UL
POTASSIUM SERPL-SCNC: 3.9 MEQ/L (ref 3.6–5.1)
PROT SERPL-MCNC: 7.1 G/DL (ref 6–8.2)
RBC # BLD AUTO: 4.73 M/UL (ref 3.93–5.22)
SODIUM SERPL-SCNC: 140 MEQ/L (ref 136–144)
TROPONIN I SERPL-MCNC: <0.02 NG/ML
TSH SERPL DL<=0.05 MIU/L-ACNC: 1.86 MIU/L (ref 0.34–5.6)
WBC # BLD AUTO: 6.6 K/UL

## 2020-01-17 PROCEDURE — 36415 COLL VENOUS BLD VENIPUNCTURE: CPT | Performed by: PHYSICIAN ASSISTANT

## 2020-01-17 PROCEDURE — 93005 ELECTROCARDIOGRAM TRACING: CPT | Performed by: PHYSICIAN ASSISTANT

## 2020-01-17 PROCEDURE — 84443 ASSAY THYROID STIM HORMONE: CPT | Performed by: PHYSICIAN ASSISTANT

## 2020-01-17 PROCEDURE — 80053 COMPREHEN METABOLIC PANEL: CPT | Performed by: PHYSICIAN ASSISTANT

## 2020-01-17 PROCEDURE — 85025 COMPLETE CBC W/AUTO DIFF WBC: CPT | Performed by: PHYSICIAN ASSISTANT

## 2020-01-17 PROCEDURE — 83735 ASSAY OF MAGNESIUM: CPT | Performed by: PHYSICIAN ASSISTANT

## 2020-01-17 PROCEDURE — 99283 EMERGENCY DEPT VISIT LOW MDM: CPT | Mod: 25

## 2020-01-17 PROCEDURE — 71046 X-RAY EXAM CHEST 2 VIEWS: CPT

## 2020-01-17 PROCEDURE — 84484 ASSAY OF TROPONIN QUANT: CPT | Performed by: PHYSICIAN ASSISTANT

## 2020-01-17 PROCEDURE — 83880 ASSAY OF NATRIURETIC PEPTIDE: CPT | Performed by: PHYSICIAN ASSISTANT

## 2020-01-17 RX ORDER — PROMETHAZINE HYDROCHLORIDE AND DEXTROMETHORPHAN HYDROBROMIDE 6.25; 15 MG/5ML; MG/5ML
5 SYRUP ORAL 4 TIMES DAILY PRN
Qty: 118 ML | Refills: 0 | Status: SHIPPED | OUTPATIENT
Start: 2020-01-17 | End: 2020-01-27

## 2020-01-17 RX ORDER — BENZONATATE 100 MG/1
100 CAPSULE ORAL
Qty: 21 CAPSULE | Refills: 0 | Status: SHIPPED | OUTPATIENT
Start: 2020-01-17 | End: 2020-03-10

## 2020-01-17 RX ORDER — PROMETHAZINE HYDROCHLORIDE AND CODEINE PHOSPHATE 6.25; 1 MG/5ML; MG/5ML
5 SOLUTION ORAL ONCE
Status: DISCONTINUED | OUTPATIENT
Start: 2020-01-17 | End: 2020-01-17 | Stop reason: HOSPADM

## 2020-01-17 ASSESSMENT — ENCOUNTER SYMPTOMS
VOMITING: 0
LIGHT-HEADEDNESS: 0
PALPITATIONS: 1
NAUSEA: 0
COUGH: 1
DIZZINESS: 0
SHORTNESS OF BREATH: 1

## 2020-01-17 NOTE — ED PROVIDER NOTES
HPI     Chief Complaint   Patient presents with   • Palpitations     76 y/o female w/ pmh AAA, on 81mg ASA, effient, presents to the ED c/o palpitations and cough onset yesterday. Pt states that she recently had a pacemaker placed, and has required 2 adjustments of pacer settings since implantation. Pt denies prior symptoms of palpitations.  States that her palpitations/fluttering sensation in her chest makes her feel as if she needs to cough. pt states that she recently had a cold. Pt deniesSOB. Pt states that she did not get a flu shot. Pt denies leg swelling, calf pain, CP, vomiting, dizziness.    Cardio: Agueda,       History provided by:  Patient   used: No    Palpitations   Associated symptoms: cough and shortness of breath    Associated symptoms: no chest pain, no dizziness, no nausea and no vomiting         Patient History     Past Medical History:   Diagnosis Date   • AAA (abdominal aortic aneurysm) (CMS/[facility])    • Arthritis    • Elevated blood pressure reading without diagnosis of hypertension 4/19/2019   • Epistaxis 1/6/2020   • GERD (gastroesophageal reflux disease) 4/19/2019   • Glaucoma 4/19/2019   • Heart murmur    • Hiatal hernia    • History of hip replacement, total, right 4/19/2019    Right hip 9/ 2016 and revision 2017    • History of rheumatic fever as a child 4/19/2019   • Hypercholesterolemia 4/19/2019   • Ischemic cardiomyopathy 5/9/2019   • Osteoarthritis of multiple joints 4/19/2019   • Osteoporosis    • Pacemaker 12/9/2019   • Raynaud's disease 4/19/2019   • RSD (reflex sympathetic dystrophy) 4/19/2019    Of left foot   • SOB (shortness of breath) 12/10/2019   • Status post insertion of drug eluting coronary artery stent 5/9/2019       Past Surgical History:   Procedure Laterality Date   • CHOLECYSTECTOMY OPEN     • CORONARY ANGIOPLASTY WITH STENT PLACEMENT  04/29/2019    of LAD   • CORONARY ANGIOPLASTY WITH STENT PLACEMENT  04/30/2019    of RCA   • DILATION AND  "CURETTAGE OF UTERUS     • EYE SURGERY      RIGHT CATARACT   • FOOT SURGERY Left    • JOINT REPLACEMENT Right 09/2016    total hip   • REVISION TOTAL HIP ARTHROPLASTY Right 2017   • TONSILLECTOMY         Family History   Problem Relation Age of Onset   • Congenital heart disease Biological Mother         noted at autopsy   • Heart attack Paternal Grandfather        Social History     Tobacco Use   • Smoking status: Never Smoker   • Smokeless tobacco: Never Used   Substance Use Topics   • Alcohol use: Not Currently   • Drug use: No       Systems Reviewed from Nursing Triage:          Review of Systems     Review of Systems   Respiratory: Positive for cough and shortness of breath.    Cardiovascular: Positive for palpitations. Negative for chest pain and leg swelling.   Gastrointestinal: Negative for nausea and vomiting.   Neurological: Negative for dizziness and light-headedness.        Physical Exam     ED Triage Vitals [01/17/20 1819]   Temp Heart Rate Resp BP SpO2   36.6 °C (97.9 °F) 75 18 (!) 171/76 97 %      Temp src Heart Rate Source Patient Position BP Location FiO2 (%) (Set)   -- -- Sitting Left upper arm --                     Patient Vitals for the past 24 hrs:   BP Temp Pulse Resp SpO2 Height Weight   01/17/20 1819 (!) 171/76 36.6 °C (97.9 °F) 75 18 97 % 1.702 m (5' 7\") 64.9 kg (143 lb)                                       Physical Exam   Constitutional: She is oriented to person, place, and time. She appears well-developed. No distress.   HENT:   Head: Atraumatic.   Eyes: Conjunctivae are normal.   Neck: Normal range of motion.   Cardiovascular: Normal rate, regular rhythm, normal heart sounds and intact distal pulses.   No murmur heard.  Pulmonary/Chest: Effort normal. No respiratory distress. She has rhonchi in the right lower field and the left lower field.   Pacemaker left chest. Scar well healing.   Abdominal: She exhibits no distension.   Musculoskeletal: Normal range of motion.        Right ankle: " She exhibits no swelling.        Left ankle: She exhibits no swelling.        Right lower leg: She exhibits no swelling.        Left lower leg: She exhibits no swelling.   Postoperative wounds to B/L big toes well healing.   Neurological: She is alert and oriented to person, place, and time.   Skin: Skin is warm and dry.   Psychiatric: She has a normal mood and affect. Her behavior is normal.   Nursing note and vitals reviewed.           ECG 12 lead  Date/Time: 1/17/2020 7:00 PM  Performed by: Kita Serna PA C  Authorized by: Kita Serna PA C     ECG reviewed by ED Physician in the absence of a cardiologist: yes    Interpretation:     Interpretation: abnormal    Rate:     ECG rate:  71    ECG rate assessment: normal    Rhythm:     Rhythm: paced    Pacing:     Capture:  Complete    Type of pacing:  AV        ED Course & MDM     Labs Reviewed - No data to display    No orders to display               MDM         ED Course as of Jan 18 0328   Fri Jan 17, 2020   1852 Impression: palpitations, cough, dyspnea  Plan: labs, CXR, EKG       [KL]   1906 Troponin I: <0.02 [KL]   2008 TSH: 1.86 [KL]   2008 Magnesium: 2.2 [KL]   2008 X-RAY CHEST 2 VIEWS [KL]   2019 Pt declining  Flu/RSV    [KL]   2029 Pt declining promethazine with codeine     [KL]   Sat Jan 18, 2020   0327 Patient given prescriptions for Tessalon Perles and Robitussin as she is refusing codeine and unable to take Sudafed products.  Advised to follow-up promptly with her primary care provider and cardiology.  ED return precautions advised.  Vital signs stable, nontoxic, no acute distress    [KL]      ED Course User Index  [KL] Kita Serna PA C         Clinical Impressions as of Jan 18 0328   Cough        By signing my name below, IAbdoulaye, attest that this documentation has been prepared under the direction and in the presence of TONA Serna PA-C.    1/17/2020 6:22 PM    Kita MURPHY, personally performed the services  described in this documentation, as documented by the scribe in my presence, and it is both accurate and complete.  1/17/2020 7:09 PM         Abdoulaye Winslow  01/17/20 8814       Kita Serna PA C  01/18/20 4778

## 2020-01-18 LAB
ATRIAL RATE: 71
P AXIS: 42
PR INTERVAL: 156
QRS DURATION: 76
QT INTERVAL: 406
QTC CALCULATION(BAZETT): 441
R AXIS: 28
T WAVE AXIS: 2
VENTRICULAR RATE: 71

## 2020-01-18 NOTE — ED ATTESTATION NOTE
I have personally seen and examined the patient.  I reviewed and agree with physician assistant / nurse practitioner’s assessment and plan of care, with the following exceptions: None  My examination, assessment, and plan of care of Jennifer Sams is as follows:    The patient is a 75-year-old female with past medical history of pacemaker, hiatal hernia, ischemic cardiomyopathy, AAA, RSD, who presented to the emergency department for evaluation of cough.  The patient reports she has had intermittent episodes of palpitations that leads to the patient having a nonproductive cough.  The patient states his symptoms began yesterday.  The patient has cough with deep breath.  The patient denies fever, chills, nausea, vomiting, diarrhea, chest pain, abdominal pain, lower extremity pain, edema, or rash.    Physical exam: Vital signs reviewed.  General: Patient appears comfortable sitting up in bed, smiles.  HEENT: NCAT, EOMI, MMM.  Neck: Supple, nontender, full range of motion, no JVD.  Chest: Mild bilateral rhonchi at bases.  No wheezing.  No increased work of breathing.  No retractions.  No respiratory distress.  Heart: Regular rate and rhythm with occasional ectopy.  No murmur.  Abdomen: Soft, nontender, nondistended.  Extremities: No cyanosis, clubbing, edema, or calf tenderness.  Skin: Warm and dry, no rash.    Plan: Check labs, chest x-ray, EKG.    Labs Reviewed   CBC AND DIFF - Abnormal       Result Value    WBC 6.60      RBC 4.73      Hemoglobin 13.3      Hematocrit 42.4      MCV 89.6      MCH 28.1      MCHC 31.4 (*)     RDW 12.6      Platelets 220      MPV 8.8 (*)     Differential Type Auto      nRBC 0.0      Immature Granulocytes 0.2      Neutrophils 64.9      Lymphocytes 24.4      Monocytes 8.2      Eosinophils 1.8      Basophils 0.5      Immature Granulocytes, Absolute 0.01      Neutrophils, Absolute 4.29      Lymphocytes, Absolute 1.61      Monocytes, Absolute 0.54      Eosinophils, Absolute 0.12       Basophils, Absolute 0.03     COMPREHENSIVE METABOLIC PANEL - Abnormal    Sodium 140      Potassium 3.9      Chloride 106      CO2 21 (*)     BUN 15      Creatinine 0.9      Glucose 105 (*)     Calcium 9.2      AST (SGOT) 29      ALT (SGPT) 18      Alkaline Phosphatase 75      Total Protein 7.1      Albumin 4.4      Bilirubin, Total 0.6      eGFR >60.0      Anion Gap 13     TROPONIN I - Normal    Troponin I <0.02     MAGNESIUM - Normal    Magnesium 2.2     TSH 3RD GENERATION W/REFLEX FT4 - Normal    TSH 1.86     B-TYPE NATRIURETIC PEPTIDE - Normal    BNP 48     RSV AND INFLUENZA A/B NUCLEIC ACIDS, PCR   RAINBOW DRAW PANEL    Narrative:     The following orders were created for panel order [facility] Draw Panel.  Procedure                               Abnormality         Status                     ---------                               -----------         ------                     RAINBOW RED[[id number]]                                      In process                 RAINBOW LAVENDER[[id number]]                                 In process                 RAINBOW LT BLUE[[id number]]                                  In process                 RAINBOW GOLD[[id number]]                                     In process                   Please view results for these tests on the individual orders.   RAINBOW RED   RAINBOW LAVENDER   RAINBOW LT BLUE   RAINBOW GOLD           Impression: Acute palpitations.  Acute cough. Bronchitis          I was physically present for the key/critical portions of the following procedures: None       Aston Calzada MD  01/17/20 [id number]

## 2020-01-20 ENCOUNTER — TELEPHONE (OUTPATIENT)
Dept: CARDIOLOGY | Facility: CLINIC | Age: 75
End: 2020-01-20

## 2020-01-20 NOTE — TELEPHONE ENCOUNTER
Patient of Dr Carolina- Patient calling. States she has been coughing and having fluttering in her chest since Thursday 1/19/20. She went to [facility] Friday 1/20/20. States she was given tessalon pearls and cough medicine,promethazine-DM. States she was discharged Friday evening but her pacemaker was unable to be checked.      She would like to be seen today to have pacemaker checked. States she has an ted on her phone to send in transmissions for pacemaker. She was unable to do yesterday.. No bp hr readings available. States continue to get fluttering in her chest and soughing and sob. No chest pain or edema. No med changes other than tessalon pearles and cough syrup. Takes aspirin 81mg and effient    She would like to be seen today to have her pacemaker Checked    HX Pacemaker 11/18 2 adjustments since,  Sob, mobitz type 2 cad     Last Dr Carolina OV 12/10/19 Next OV 3/10/20    Please contact at  thank you

## 2020-01-20 NOTE — TELEPHONE ENCOUNTER
"Dr. Carolina,   Patient feels palps/fluttering, worse with moving around. She has a Mdt dc pacer.   She was seen in [place] [facility] Friday night for this symptom and discharged with script for cough syrup. Chest xray, labs ekg ok. I had her download today. Transmission report is benign. In NSR, no arrhythmias.  She had her AV delays adjusted twice in Nov and Dec due to symptoms such as sob, palps  She feels the heart flutters are causing coughing. She feels her sinus problems are contributing to coughing as well.  She would like to come in for \"pacemaker adjustment\" and prefers to see you. You are booked so I put her on for NP appt tomorrow. Patient is agreeable to this plan. Any further rec? ty julieta, wayne  .   Ralph [place],  Spoke to Sharlene GÓMEZ to add to [facility] schedule 1130am on Tuesday 1/21/20  "

## 2020-01-20 NOTE — TELEPHONE ENCOUNTER
I assisted Ms. Sams with sending in a manual [employer] transmission. The report was given to Naila Forrester for review.

## 2020-01-21 ENCOUNTER — OFFICE VISIT (OUTPATIENT)
Dept: CARDIOLOGY | Facility: CLINIC | Age: 75
End: 2020-01-21
Payer: MEDICARE

## 2020-01-21 VITALS
HEART RATE: 64 BPM | DIASTOLIC BLOOD PRESSURE: 78 MMHG | WEIGHT: 142 LBS | SYSTOLIC BLOOD PRESSURE: 122 MMHG | RESPIRATION RATE: 16 BRPM | HEIGHT: 67 IN | BODY MASS INDEX: 22.29 KG/M2

## 2020-01-21 DIAGNOSIS — I25.5 ISCHEMIC CARDIOMYOPATHY: Primary | ICD-10-CM

## 2020-01-21 DIAGNOSIS — I44.1 MOBITZ TYPE 2 SECOND DEGREE ATRIOVENTRICULAR BLOCK: ICD-10-CM

## 2020-01-21 DIAGNOSIS — I44.30 AV BLOCK: ICD-10-CM

## 2020-01-21 PROCEDURE — 93000 ELECTROCARDIOGRAM COMPLETE: CPT | Performed by: NURSE PRACTITIONER

## 2020-01-21 PROCEDURE — 99215 OFFICE O/P EST HI 40 MIN: CPT | Mod: 25 | Performed by: NURSE PRACTITIONER

## 2020-01-21 NOTE — PROGRESS NOTES
Reason for visit: Follow-up after recent visit to EDwith a complaint of palpitation and cough.  Patient states that she experience fluttering sensation in her chest with exertion which makes her cough.      NEVILLE Sams is a 75 y.o. female with a history of triple vessel CAD, NSTEMI 4/26/19,  s/p FRANK x 2 LAD 4/27/19 + FRANK x 4 RCA 4/29/19 + FRANK x 1 LCX OM2 5/2/19, hypercholesterolemia, resolved ischemic cardiomyopathy, hiatal hernia, GERD, and osteoarthritis of her hips  who underwent a dual-chamber pacemaker implant for 2:1 AV block on 11/18/2019 by Dr. Carolina.    Since the pacemaker implant patient has not been feeling well. She complained of palpitation, shortness of breath, nausea and dizziness with minimal exertion.  Her symptoms resolved with decreasing the AV interval in the settings. The paced AV interval was decreased from 260 ms to 180 ms and sensed AV interval was changed from 260ms to 160 ms. Her symptoms resolved with decreasing the AV interval in the settings. An echocardiogram shows no evidence of pericardial effusion.     Patient reports that she suffered from URI during holidays which was treated with antibiotic and she was doing fine until last week when she developed the fluttering sensation in her chest with exertion which is making her cough.  She was evaluated in ED on 1/17/2019.  Diagnostic testing were all WNL. She was prescribed Tessalon Perles and promethazine for cough and was instructed to follow-up with cardiology and PCP.   Patient is here for follow-up evaluation.  She still presents with the same complaint.  She had not started taking her cough medicine yet as the pharmacist warned her of possible interaction.      Past Medical History:   Diagnosis Date   • AAA (abdominal aortic aneurysm) (CMS/[facility])    • Arthritis    • Elevated blood pressure reading without diagnosis of hypertension 4/19/2019   • Epistaxis 1/6/2020   • GERD (gastroesophageal reflux disease) 4/19/2019   •  Glaucoma 4/19/2019   • Heart murmur    • Hiatal hernia    • History of hip replacement, total, right 4/19/2019    Right hip 9/ 2016 and revision 2017    • History of rheumatic fever as a child 4/19/2019   • Hypercholesterolemia 4/19/2019   • Ischemic cardiomyopathy 5/9/2019   • Osteoarthritis of multiple joints 4/19/2019   • Osteoporosis    • Pacemaker 12/9/2019   • Raynaud's disease 4/19/2019   • RSD (reflex sympathetic dystrophy) 4/19/2019    Of left foot   • SOB (shortness of breath) 12/10/2019   • Status post insertion of drug eluting coronary artery stent 5/9/2019     Past Surgical History:   Procedure Laterality Date   • CHOLECYSTECTOMY OPEN     • CORONARY ANGIOPLASTY WITH STENT PLACEMENT  04/29/2019    of LAD   • CORONARY ANGIOPLASTY WITH STENT PLACEMENT  04/30/2019    of RCA   • DILATION AND CURETTAGE OF UTERUS     • EYE SURGERY      RIGHT CATARACT   • FOOT SURGERY Left    • JOINT REPLACEMENT Right 09/2016    total hip   • REVISION TOTAL HIP ARTHROPLASTY Right 2017   • TONSILLECTOMY       Social History     Social History Narrative   • Not on file     Family History   Problem Relation Age of Onset   • Congenital heart disease Biological Mother         noted at autopsy   • Heart attack Paternal Grandfather      Ace inhibitors; Atorvastatin; Brilinta [ticagrelor]; Codeine; Dilaudid [hydromorphone]; Onion; Oxycodone; and Morphine sulfate     Current Outpatient Medications   Medication Sig Dispense Refill   • aspirin (ASPIR-81) 81 mg enteric coated tablet Take 1 tablet (81 mg total) by mouth daily. 90 tablet 5   • benzonatate (TESSALON) 100 mg capsule Take 1 capsule (100 mg total) by mouth every 8 (eight) hours for 7 days. 21 capsule 0   • nitroglycerin (NITROSTAT) 0.4 mg SL tablet Place 1 tablet (0.4 mg total) under the tongue every 5 (five) minutes as needed for chest pain. 25 tablet 4   • pantoprazole (PROTONIX) 40 mg EC tablet Take 40 mg by mouth daily.     • prasugrel (EFFIENT) 10 mg tablet Take 1 tablet  (10 mg total) by mouth daily. 90 tablet 4   • promethazine-DM (PROMETHAZINE-DM) 6.25-15 mg/5 mL syrup Take 5 mL by mouth 4 (four) times a day as needed for cough for up to 10 days. 118 mL 0   • rosuvastatin (CRESTOR) 20 mg tablet Take 1 tablet (20 mg total) by mouth daily. 90 tablet 4     No current facility-administered medications for this visit.      11/18/2019 Dual-chamber Medtronic pacemaker implant by Dr. Carolina for paroxysmal Mobitz second-degree AV block    11/15/2019 Cardiac  Catheterization-     FINDINGS:  1.  Patent stents in the proximal and mid RCA, mid and distal LAD and proximal LCx into large OM1.     2.  50% ostial LCx stenosis, iFR negative (1.0)     3.  Normal left cardiac filling pressure, LVEDP 1 mmHg        RECOMMENDATIONS:    Optimal medical therapy and aggressive risk factor modification.     11/13/2019 exercise stress with mild perfusion SPECT  Interpretation Summary     1. Exercise technetium tetrofosmin was terminated at 2 minutes due to hypotension (BP dropped from 110/80 to 80/60 mmHg) and bradycardia (HR dropped from 97 to 57 bpm). Patient was markedly short of breath during exercise.  Perfusion was normal at the workload achieved but the test is nondiagnostic due to poor exercise tolerance and inability to achieve target heart rate.    2. ECG is non-diagnostic due to not achieving the target heart rate.  3. Gated imaging reveals normal wall motion with hyperdynamic left ventricular systolic function.  4. The results were discussed with the consulting cardiology fellow.     6/11/2019 Transthoracic Echo    Interpretation Summary   Technically very difficult study.  Mild concentric left ventricular hypertrophy.  Normal cavity size.  Ejection fraction 55 to 60%.  Impaired left ventricular relaxation.  Mitral valve sclerosis.  Normal left atrium.  Aortic valve and root sclerosis with trace aortic insufficiency.  Normal right heart.  Thickened pericardium.  Compared to the study 4/27/2019,  systolic function has normalized.     5/2/19  STAGED INTERVENTION:  Successful PCI of the OM lesion with a Resolute Isabella 2.00 x 30mm FRANK, post-dilated with an 2.5 x 20 mm non-compliant balloon to 16 ana, with no residual stenosis and ITALO 3 flow.  4/30/2019 STAGED INTERVENTION:  1. Successful PCI of the proximal RCA lesion with a Resolute Isabella 3.0 x 15mm FRANK, post-dilated with a 3.5 x 12 mm non-compliant balloon to 20 aan, with no residual stenosis and ITALO 3 flow.  2. Successful PCI of the mid RCA lesion with a Resolute Jason 3.0 x 15mm FRANK, deployed at 16 ana, with no residual stenosis and ITALO 3 flow.  3. Successful PCI of the distal RCA and proximal PDA lesion with over-lapping drug-eluting stents (Resolute Jason 2.00 x 22mm FRANK and Resolute Isabella 2.00 x 15mm FRANK), post-dilated with a 2.5 x 15 mm non-compliant balloon to 18 ana, with no residual stenosis and ITALO 3 flow.    4/29/2019 Left Heart Cath with PCI    FINDINGS:  1. 95-99% mid LAD stenosis and a long 80% mid to distal LAD stenosis.   2. 90% proximal, 90% mid and 90-95% distal RCA lesions.   3. 95-99% proximal to mid OM 2 stenosis.   4. Elevated LVEDP (23 mmHg).     INTERVENTION:  1. Successful PCI of the mid LAD lesion with a  Resolute Jason 2.50 X 18mm FRANK, deployed at 12 ana, with no residual stenosis and ITALO 3 flow.  2. Successful PCI of the mid to distal LAD with over-lapping drug-eluting stents (Resolute Jason 2.0 x 26 and  Resolute Jason 2.0 mm x 30 mm FRANK), post-dilated with the stent balloon up to 14 ana, with no residual stenosis and ITAOL 3 flow.     Objective    Wt Readings from Last 3 Encounters:   01/17/20 64.9 kg (143 lb)   01/06/20 64.9 kg (143 lb)   12/10/19 64.1 kg (141 lb 5 oz)     Temp Readings from Last 3 Encounters:   01/17/20 36.6 °C (97.9 °F)   11/19/19 36.7 °C (98.1 °F) (Oral)   05/03/19 36.6 °C (97.8 °F) (Oral)     BP Readings from Last 3 Encounters:   01/17/20 (!) 146/75   01/06/20 122/74   12/10/19 128/76     Pulse Readings from  Last 3 Encounters:   01/17/20 70   01/06/20 68   12/10/19 68     Cardiac Imaging   TRANSTHORACIC ECHO (TTE) COMPLETE 06/11/2019    Narrative Technically very difficult study.  Mild concentric left ventricular hypertrophy.  Normal cavity size.    Ejection fraction 55 to 60%.  Impaired left ventricular relaxation.  Mitral valve sclerosis.  Normal left atrium.  Aortic valve and root sclerosis with trace aortic insufficiency.  Normal right heart.  Thickened pericardium.  Compared to the study 4/27/2019, systolic function has normalized.   Review of system:  Pertinent ROS as per HPI , all other systems negative.    Physical  Examination :    General Appearance:  Alert, no distress   Head:  Normocephalic, without obvious abnormality, atraumatic   Eyes:  Conjunctiva/corneas clear, EOM's intact   Neck: No thyroid enlargement. No JVD. No bruits    Lungs:   Clear to auscultation bilaterally, respirations unlabored, no rales, no wheezing   Heart:  Regular rhythm, S1 and S2 normal, no murmur, rub or gallop   Abdomen:   Soft, non-tender, no masses, no organomegaly   Vascular: Pulses 2+ and symmetric all extremities, no carotid bruit or jugular vein distention   Musculoskeletal:  Skin: No injury or deformity  Skin color, texture, turgor normal, no rashes or lesions, no cyanosis or edema   Extremities: Extremities normal, atraumatic, pulses normal   Behavior/Emotional: Appropriate, cooperative     Lab Results   Component Value Date    WBC 6.60 01/17/2020    HGB 13.3 01/17/2020     01/17/2020    CHOL 161 12/30/2019    TRIG 138 12/30/2019    HDL 68 12/30/2019    ALT 18 01/17/2020    AST 29 01/17/2020     01/17/2020    K 3.9 01/17/2020    CREATININE 0.9 01/17/2020    TSH 1.86 01/17/2020    INR 1.0 11/18/2019      ECG   I have personally reviewed the EKG dated 1/21/2020. It shows sinus rhythm with ventricular rate at 67 bpm.    Device interrogation:  Device: Dual Chamber Pacemaker     Indication:paroxysmal Mobitz 2 AV  block.    Model : Medtronic  Citronelle XT DR MRI W1 DR 01, serial #[id number].     Atrial pacing electrode is a Medtronic, model #5076, the serial #[id number].     Right ventricular pacing electrode is a Medtronic, model #5076, the serial #[id number]    Woiwypanm32/18/2019 Battery: 11.9 years    Underlying rhythm: sinus rhythm  Presenting rhythm: A sensed V paced  Pacemaker dependent:No      P wave:   2.9mV;        Threshold: 0.625 V at  0.4 ms            Impedance: 646 hms  RV wave: 14.8 mV;      Threshold: 0.5 V at  0.4 ms            Impedance: 532 ohms    Atrial tachyarrhythmia: 0  Ventricular tachyarrhythmia: 0    Atrial pacin.7 %;   Ventricular pacin%      Settings  Mode: DDD  Lower Rate Limit: 60 bpm  Upper Tracking Rate: 150 bpm    VT Monitor : >154 bpm  AT/AF >171 bpm    Summary:  Normally functioning dual-chamber pacemaker. I did not make any changes to her setting in permanent pacing.     Assessment   Mobitz type 2 second degree atrioventricular block  Assessment & Plan  Symptomatic 2:1 AV block consistent with Mobitz 2 block. She underwent implantation of Medtronic dual-chamber pacemaker on 2019.  Since the pacemaker implant patient has not been feeling well. She complained of palpitation, shortness of breath, nausea and dizziness with minimal exertion.  Her symptoms resolved with decreasing the AV interval in the settings. However patient developed fluttering in her chest and cough last week. Her device was interrogated and found that her device is functioning normally, no arrhythmias noted. She was made to ambulate in the hallway for a few minutes and her heart rate was checked.  Her heart rate slightly variated as a normal response to exertion. I believe her symptoms are most likely post viral cough related to recent URI. Patient was evaluated by Dr. Carolina. As no arrhythmia was detected on device interrogation, she was instructed to take the cough medicine and follow-up with her primary  care physician. Will follow.     Rosey Sosa  1/21/2020

## 2020-02-10 ENCOUNTER — TELEPHONE (OUTPATIENT)
Dept: CARDIOLOGY | Facility: CLINIC | Age: 75
End: 2020-02-10

## 2020-03-06 ASSESSMENT — ENCOUNTER SYMPTOMS
ENDOCRINE NEGATIVE: 1
MUSCULOSKELETAL NEGATIVE: 1
PSYCHIATRIC NEGATIVE: 1
GASTROINTESTINAL NEGATIVE: 1
EYES NEGATIVE: 1

## 2020-03-06 NOTE — PROGRESS NOTES
Electrophysiology  Outpatient Note         HPI   Jennifer Sams is a 75 y.o. female who is seen today status post dual-chamber pacemaker on November 18, 2019 indicated for 2:1 AV block.  She initially still had symptoms of dyspnea, but these were improved with optimization of her AV delays. At this time she is doing much better, but still has dyspnea with rapidly climbing stairs.     Past Medical History:   Diagnosis Date   • AAA (abdominal aortic aneurysm) (CMS/[facility])    • Arthritis    • Elevated blood pressure reading without diagnosis of hypertension 4/19/2019   • Epistaxis 1/6/2020   • GERD (gastroesophageal reflux disease) 4/19/2019   • Glaucoma 4/19/2019   • Heart murmur    • Hiatal hernia    • History of hip replacement, total, right 4/19/2019    Right hip 9/ 2016 and revision 2017    • History of rheumatic fever as a child 4/19/2019   • Hypercholesterolemia 4/19/2019   • Ischemic cardiomyopathy 5/9/2019   • Osteoarthritis of multiple joints 4/19/2019   • Osteoporosis    • Pacemaker 12/9/2019   • Raynaud's disease 4/19/2019   • RSD (reflex sympathetic dystrophy) 4/19/2019    Of left foot   • SOB (shortness of breath) 12/10/2019   • Status post insertion of drug eluting coronary artery stent 5/9/2019       Past Surgical History:   Procedure Laterality Date   • CHOLECYSTECTOMY OPEN     • CORONARY ANGIOPLASTY WITH STENT PLACEMENT  04/29/2019    of LAD   • CORONARY ANGIOPLASTY WITH STENT PLACEMENT  04/30/2019    of RCA   • DILATION AND CURETTAGE OF UTERUS     • EYE SURGERY      RIGHT CATARACT   • FOOT SURGERY Left    • JOINT REPLACEMENT Right 09/2016    total hip   • REVISION TOTAL HIP ARTHROPLASTY Right 2017   • TONSILLECTOMY         Allergies: Ace inhibitors; Atorvastatin; Brilinta [ticagrelor]; Codeine; Dilaudid [hydromorphone]; Onion; Oxycodone; and Morphine sulfate    Current Outpatient Medications   Medication Sig Dispense Refill   • aspirin (ASPIR-81) 81 mg enteric coated tablet Take 1 tablet (81 mg  total) by mouth daily. 90 tablet 5   • nitroglycerin (NITROSTAT) 0.4 mg SL tablet Place 1 tablet (0.4 mg total) under the tongue every 5 (five) minutes as needed for chest pain. 25 tablet 4   • pantoprazole (PROTONIX) 40 mg EC tablet Take 40 mg by mouth daily.     • prasugrel (EFFIENT) 10 mg tablet Take 1 tablet (10 mg total) by mouth daily. 90 tablet 4   • rosuvastatin (CRESTOR) 20 mg tablet Take 1 tablet (20 mg total) by mouth daily. 90 tablet 4     No current facility-administered medications for this visit.        Social History     Tobacco Use   • Smoking status: Never Smoker   • Smokeless tobacco: Never Used   Substance Use Topics   • Alcohol use: Not Currently   • Drug use: No       Family History   Problem Relation Age of Onset   • Congenital heart disease Biological Mother         noted at autopsy   • Heart attack Paternal Grandfather        Review of Systems   Constitution: Negative for malaise/fatigue.   HENT: Negative for congestion.    Eyes: Negative.    Cardiovascular: Negative for dyspnea on exertion and palpitations.   Respiratory: Negative for cough and shortness of breath.    Endocrine: Negative.    Hematologic/Lymphatic: Negative for bleeding problem.   Skin: Negative.    Musculoskeletal: Negative.    Gastrointestinal: Negative.    Genitourinary: Negative.    Neurological: Negative for weakness.   Psychiatric/Behavioral: Negative.        Objective     Vitals:    03/10/20 1314   BP: 120/72   Pulse: 69   Resp: 16       Physical Exam   Constitutional: She is oriented to person, place, and time. She appears well-developed and well-nourished.   HENT:   Head: Normocephalic and atraumatic.   Eyes: Conjunctivae are normal.   Neck: Normal range of motion. Neck supple.   Cardiovascular: Normal rate, regular rhythm and normal heart sounds.   No murmur heard.  Pulmonary/Chest: Effort normal and breath sounds normal.   Abdominal: Soft. Bowel sounds are normal.   Musculoskeletal: Normal range of motion. She  exhibits no edema.   Neurological: She is alert and oriented to person, place, and time. She has normal reflexes.   Skin: Skin is warm and dry.   Psychiatric: She has a normal mood and affect. Her behavior is normal. Judgment and thought content normal.        Labs   Lab Results   Component Value Date    WBC 6.60 01/17/2020    HGB 13.3 01/17/2020    HCT 42.4 01/17/2020     01/17/2020    CHOL 161 12/30/2019    TRIG 138 12/30/2019    HDL 68 12/30/2019    ALT 18 01/17/2020    AST 29 01/17/2020     01/17/2020    K 3.9 01/17/2020     01/17/2020    CREATININE 0.9 01/17/2020    BUN 15 01/17/2020    CO2 21 (L) 01/17/2020    TSH 1.86 01/17/2020    INR 1.0 11/18/2019     BP Readings from Last 3 Encounters:   03/10/20 120/72   01/21/20 122/78   01/17/20 (!) 146/75     PACEMAKER IMPLANTATION- 11/18/2019  Implanted Hardware:   The pacemaker generator is a [employer], model W1 DR 01, serial #[id number].   The atrial pacing electrode is a Medtronic, model #5076, the serial #[id number].   The right ventricular pacing electrode is a Medtronic, model #5076, the serial #[id number].     Intraoperative Testing:   Right atrium: intrinsic amplitude 2.2 mV, impedance 1025 ohms, pacing threshold 0.8 V @0.5 msec.   Right ventricle: intrinsic amplitude 9.6 mV, impedance 864 ohms, pacing threshold 0.4 V @0.5 msec.    CARDIAC CATH- 11/15/2019  Patent stents in the proximal and mid RCA, mid and distal LAD and proximal LCx into large OM1.  50% ostial LCx stenosis, iFR negative (1.0)   Normal left cardiac filling pressure, LVEDP 1 mmHg    CV NM EXERCISE STRESS 11/13/2019  Exercise technetium tetrofosmin was terminated at 2 minutes due to hypotension (BP dropped from 110/80 to 80/60 mmHg) and bradycardia (HR dropped from 97 to 57 bpm). Patient was markedly short of breath during exercise. Perfusion was normal at the workload achieved but the test is nondiagnostic due to poor exercise tolerance and inability to achieve target  heart rate.    ECG is non-diagnostic due to not achieving the target heart rate.  Gated imaging reveals normal wall motion with hyperdynamic left ventricular systolic function.  The results were discussed with the consulting cardiology fellow.     ECHOCARDIOGRAM 11/29/2019  Technically limited study no gross chamber enlargement  Imaging done just to exclude pericardial effusion Limited echo study  No regional wall motion abnormality preserved ejection fraction 55 to 60%  Aortic sclerosis  Mitral tricuspid valves appear normal  Prominent anterior fat pad no evidence of pericardial effusion or tamponade    ECG Atrial sense, ventricular pace/fusion    Assessment/Plan   Problem List Items Addressed This Visit        Respiratory    SOB (shortness of breath)     Still with some mild dyspnea. Suspect she has a significant component of deconditioning and have advised her to continue exercising. She has minimal symptoms with normal activities.            Circulatory    Abdominal aortic aneurysm (AAA) without rupture (CMS/[facility])     2.9 cm- 2018         Relevant Orders    ECG 12 LEAD-OFFICE PERFORMED (Completed)    NSTEMI (non-ST elevated myocardial infarction) (CMS/HCC) - Primary     CARDIAC CATH- 11/15/2019  Patent stents in the proximal and mid RCA, mid and distal LAD and proximal LCx into large OM1.  50% ostial LCx stenosis, iFR negative (1.0)   Normal left cardiac filling pressure, LVEDP 1 mmHg         Relevant Orders    ECG 12 LEAD-OFFICE PERFORMED (Completed)    Ischemic cardiomyopathy     Systolic function has  normalized. See echo above         Mobitz type 2 second degree atrioventricular block     Symptomatic 2:1 AV block consistent with Mobitz 2 block. She underwent implantation of Medtronic dual-chamber pacemaker on 11/18/2019.  Her symptoms of SOB post implant resolved with decreasing the AV interval in the settings. The paced AV interval was decreased from 260 ms to 180 ms and sensed AV interval was changed from  260ms to 160 ms.  Since then she feels slight improvement in her SOB.             Endocrine/Metabolic    Hypercholesterolemia     On Crestor, managed by another physician.          Relevant Orders    ECG 12 LEAD-OFFICE PERFORMED (Completed)       Other    Pacemaker     Medtronic Rach XT DR SUMNER.  Remaining battery longevity is 12.0 years.  DDD  bpm.  Paced  ms, sensed  ms.  Upper sense 130 bpm.  RV 0.5volts @ 0.4ms, R wave 13.0 mV and lead impedance 512 ohms. Atrial threshold 0.75V @ 0.4ms, P wave 2.1 mV and lead impedance 533 ohms.. Atrial pacing 30 %. Ventricular pacing 86 %.  No changes were made in the program parameters of the device.         Relevant Orders    ECG 12 LEAD-OFFICE PERFORMED (Completed)      Other Visit Diagnoses     AV block        Relevant Orders    ECG 12 LEAD-OFFICE PERFORMED (Completed)          Jermaine Carolina MD  3/18/2020

## 2020-03-10 ENCOUNTER — OFFICE VISIT (OUTPATIENT)
Dept: CARDIOLOGY | Facility: CLINIC | Age: 75
End: 2020-03-10
Payer: MEDICARE

## 2020-03-10 VITALS
BODY MASS INDEX: 22.46 KG/M2 | SYSTOLIC BLOOD PRESSURE: 120 MMHG | RESPIRATION RATE: 16 BRPM | HEART RATE: 69 BPM | DIASTOLIC BLOOD PRESSURE: 72 MMHG | HEIGHT: 67 IN | WEIGHT: 143.1 LBS

## 2020-03-10 DIAGNOSIS — I44.30 AV BLOCK: ICD-10-CM

## 2020-03-10 DIAGNOSIS — I21.4 NSTEMI (NON-ST ELEVATED MYOCARDIAL INFARCTION) (CMS/HCC): Primary | ICD-10-CM

## 2020-03-10 DIAGNOSIS — I25.5 ISCHEMIC CARDIOMYOPATHY: ICD-10-CM

## 2020-03-10 DIAGNOSIS — Z95.0 PACEMAKER: ICD-10-CM

## 2020-03-10 DIAGNOSIS — I71.40 ABDOMINAL AORTIC ANEURYSM (AAA) WITHOUT RUPTURE (CMS/HCC): ICD-10-CM

## 2020-03-10 DIAGNOSIS — I44.1 MOBITZ TYPE 2 SECOND DEGREE ATRIOVENTRICULAR BLOCK: ICD-10-CM

## 2020-03-10 DIAGNOSIS — E78.00 HYPERCHOLESTEROLEMIA: ICD-10-CM

## 2020-03-10 DIAGNOSIS — R06.02 SOB (SHORTNESS OF BREATH): ICD-10-CM

## 2020-03-10 PROCEDURE — 93000 ELECTROCARDIOGRAM COMPLETE: CPT | Performed by: INTERNAL MEDICINE

## 2020-03-10 PROCEDURE — 99214 OFFICE O/P EST MOD 30 MIN: CPT | Performed by: INTERNAL MEDICINE

## 2020-03-10 NOTE — LETTER
March 18, 2020     Anitra Way MD  [address] [facility]  [place] [address]    Patient: Jennifer Sams  YOB: 1945  Date of Visit: 3/10/2020      Dear Dr. Way:    Thank you for referring Jennifer Sams to me for evaluation. Below are my notes for this consultation.    If you have questions, please do not hesitate to call me. I look forward to following your patient along with you.         Sincerely,        Jermaine Carolina MD        CC: No Recipients  Jermaien Carolina MD  3/18/2020  9:57 AM  Signed       Electrophysiology  Outpatient Note         HPI   Jennifer Sams is a 75 y.o. female who is seen today status post dual-chamber pacemaker on November 18, 2019 indicated for 2:1 AV block.  She initially still had symptoms of dyspnea, but these were improved with optimization of her AV delays. At this time she is doing much better, but still has dyspnea with rapidly climbing stairs.     Past Medical History:   Diagnosis Date   • AAA (abdominal aortic aneurysm) (CMS/[facility])    • Arthritis    • Elevated blood pressure reading without diagnosis of hypertension 4/19/2019   • Epistaxis 1/6/2020   • GERD (gastroesophageal reflux disease) 4/19/2019   • Glaucoma 4/19/2019   • Heart murmur    • Hiatal hernia    • History of hip replacement, total, right 4/19/2019    Right hip 9/ 2016 and revision 2017    • History of rheumatic fever as a child 4/19/2019   • Hypercholesterolemia 4/19/2019   • Ischemic cardiomyopathy 5/9/2019   • Osteoarthritis of multiple joints 4/19/2019   • Osteoporosis    • Pacemaker 12/9/2019   • Raynaud's disease 4/19/2019   • RSD (reflex sympathetic dystrophy) 4/19/2019    Of left foot   • SOB (shortness of breath) 12/10/2019   • Status post insertion of drug eluting coronary artery stent 5/9/2019       Past Surgical History:   Procedure Laterality Date   • CHOLECYSTECTOMY OPEN     • CORONARY ANGIOPLASTY WITH STENT PLACEMENT  04/29/2019    of LAD   • CORONARY ANGIOPLASTY WITH STENT  PLACEMENT  04/30/2019    of RCA   • DILATION AND CURETTAGE OF UTERUS     • EYE SURGERY      RIGHT CATARACT   • FOOT SURGERY Left    • JOINT REPLACEMENT Right 09/2016    total hip   • REVISION TOTAL HIP ARTHROPLASTY Right 2017   • TONSILLECTOMY         Allergies: Ace inhibitors; Atorvastatin; Brilinta [ticagrelor]; Codeine; Dilaudid [hydromorphone]; Onion; Oxycodone; and Morphine sulfate    Current Outpatient Medications   Medication Sig Dispense Refill   • aspirin (ASPIR-81) 81 mg enteric coated tablet Take 1 tablet (81 mg total) by mouth daily. 90 tablet 5   • nitroglycerin (NITROSTAT) 0.4 mg SL tablet Place 1 tablet (0.4 mg total) under the tongue every 5 (five) minutes as needed for chest pain. 25 tablet 4   • pantoprazole (PROTONIX) 40 mg EC tablet Take 40 mg by mouth daily.     • prasugrel (EFFIENT) 10 mg tablet Take 1 tablet (10 mg total) by mouth daily. 90 tablet 4   • rosuvastatin (CRESTOR) 20 mg tablet Take 1 tablet (20 mg total) by mouth daily. 90 tablet 4     No current facility-administered medications for this visit.        Social History     Tobacco Use   • Smoking status: Never Smoker   • Smokeless tobacco: Never Used   Substance Use Topics   • Alcohol use: Not Currently   • Drug use: No       Family History   Problem Relation Age of Onset   • Congenital heart disease Biological Mother         noted at autopsy   • Heart attack Paternal Grandfather        Review of Systems   Constitution: Negative for malaise/fatigue.   HENT: Negative for congestion.    Eyes: Negative.    Cardiovascular: Negative for dyspnea on exertion and palpitations.   Respiratory: Negative for cough and shortness of breath.    Endocrine: Negative.    Hematologic/Lymphatic: Negative for bleeding problem.   Skin: Negative.    Musculoskeletal: Negative.    Gastrointestinal: Negative.    Genitourinary: Negative.    Neurological: Negative for weakness.   Psychiatric/Behavioral: Negative.        Objective     Vitals:    03/10/20 1314    BP: 120/72   Pulse: 69   Resp: 16       Physical Exam   Constitutional: She is oriented to person, place, and time. She appears well-developed and well-nourished.   HENT:   Head: Normocephalic and atraumatic.   Eyes: Conjunctivae are normal.   Neck: Normal range of motion. Neck supple.   Cardiovascular: Normal rate, regular rhythm and normal heart sounds.   No murmur heard.  Pulmonary/Chest: Effort normal and breath sounds normal.   Abdominal: Soft. Bowel sounds are normal.   Musculoskeletal: Normal range of motion. She exhibits no edema.   Neurological: She is alert and oriented to person, place, and time. She has normal reflexes.   Skin: Skin is warm and dry.   Psychiatric: She has a normal mood and affect. Her behavior is normal. Judgment and thought content normal.        Labs   Lab Results   Component Value Date    WBC 6.60 01/17/2020    HGB 13.3 01/17/2020    HCT 42.4 01/17/2020     01/17/2020    CHOL 161 12/30/2019    TRIG 138 12/30/2019    HDL 68 12/30/2019    ALT 18 01/17/2020    AST 29 01/17/2020     01/17/2020    K 3.9 01/17/2020     01/17/2020    CREATININE 0.9 01/17/2020    BUN 15 01/17/2020    CO2 21 (L) 01/17/2020    TSH 1.86 01/17/2020    INR 1.0 11/18/2019     BP Readings from Last 3 Encounters:   03/10/20 120/72   01/21/20 122/78   01/17/20 (!) 146/75     PACEMAKER IMPLANTATION- 11/18/2019  Implanted Hardware:   The pacemaker generator is a Medtronic, model W1 DR 01, serial #[id number].   The atrial pacing electrode is a Medtronic, model #5076, the serial #[id number].   The right ventricular pacing electrode is a Medtronic, model #5076, the serial #[id number].     Intraoperative Testing:   Right atrium: intrinsic amplitude 2.2 mV, impedance 1025 ohms, pacing threshold 0.8 V @0.5 msec.   Right ventricle: intrinsic amplitude 9.6 mV, impedance 864 ohms, pacing threshold 0.4 V @0.5 msec.    CARDIAC CATH- 11/15/2019  Patent stents in the proximal and mid RCA, mid and distal LAD and  proximal LCx into large OM1.  50% ostial LCx stenosis, iFR negative (1.0)   Normal left cardiac filling pressure, LVEDP 1 mmHg    CV NM EXERCISE STRESS 11/13/2019  Exercise technetium tetrofosmin was terminated at 2 minutes due to hypotension (BP dropped from 110/80 to 80/60 mmHg) and bradycardia (HR dropped from 97 to 57 bpm). Patient was markedly short of breath during exercise. Perfusion was normal at the workload achieved but the test is nondiagnostic due to poor exercise tolerance and inability to achieve target heart rate.    ECG is non-diagnostic due to not achieving the target heart rate.  Gated imaging reveals normal wall motion with hyperdynamic left ventricular systolic function.  The results were discussed with the consulting cardiology fellow.     ECHOCARDIOGRAM 11/29/2019  Technically limited study no gross chamber enlargement  Imaging done just to exclude pericardial effusion Limited echo study  No regional wall motion abnormality preserved ejection fraction 55 to 60%  Aortic sclerosis  Mitral tricuspid valves appear normal  Prominent anterior fat pad no evidence of pericardial effusion or tamponade    ECG Atrial sense, ventricular pace/fusion    Assessment/Plan   Problem List Items Addressed This Visit        Respiratory    SOB (shortness of breath)     Still with some mild dyspnea. Suspect she has a significant component of deconditioning and have advised her to continue exercising. She has minimal symptoms with normal activities.            Circulatory    Abdominal aortic aneurysm (AAA) without rupture (CMS/HCC)     2.9 cm- 2018         Relevant Orders    ECG 12 LEAD-OFFICE PERFORMED (Completed)    NSTEMI (non-ST elevated myocardial infarction) (CMS/HCC) - Primary     CARDIAC CATH- 11/15/2019  Patent stents in the proximal and mid RCA, mid and distal LAD and proximal LCx into large OM1.  50% ostial LCx stenosis, iFR negative (1.0)   Normal left cardiac filling pressure, LVEDP 1 mmHg          Relevant Orders    ECG 12 LEAD-OFFICE PERFORMED (Completed)    Ischemic cardiomyopathy     Systolic function has  normalized. See echo above         Mobitz type 2 second degree atrioventricular block     Symptomatic 2:1 AV block consistent with Mobitz 2 block. She underwent implantation of Medtronic dual-chamber pacemaker on 11/18/2019.  Her symptoms of SOB post implant resolved with decreasing the AV interval in the settings. The paced AV interval was decreased from 260 ms to 180 ms and sensed AV interval was changed from 260ms to 160 ms.  Since then she feels slight improvement in her SOB.             Endocrine/Metabolic    Hypercholesterolemia     On Crestor, managed by another physician.          Relevant Orders    ECG 12 LEAD-OFFICE PERFORMED (Completed)       Other    Pacemaker     Medtronic Rach XT DR BURAK.  Remaining battery longevity is 12.0 years.  DDD  bpm.  Paced  ms, sensed  ms.  Upper sense 130 bpm.  RV 0.5volts @ 0.4ms, R wave 13.0 mV and lead impedance 512 ohms. Atrial threshold 0.75V @ 0.4ms, P wave 2.1 mV and lead impedance 533 ohms.. Atrial pacing 30 %. Ventricular pacing 86 %.  No changes were made in the program parameters of the device.         Relevant Orders    ECG 12 LEAD-OFFICE PERFORMED (Completed)      Other Visit Diagnoses     AV block        Relevant Orders    ECG 12 LEAD-OFFICE PERFORMED (Completed)          Jermaine Carolina MD  3/18/2020

## 2020-03-11 NOTE — ASSESSMENT & PLAN NOTE
Symptomatic 2:1 AV block consistent with Mobitz 2 block. She underwent implantation of Medtronic dual-chamber pacemaker on 11/18/2019.  Her symptoms of SOB post implant resolved with decreasing the AV interval in the settings. The paced AV interval was decreased from 260 ms to 180 ms and sensed AV interval was changed from 260ms to 160 ms.  Since then she feels slight improvement in her SOB.

## 2020-03-11 NOTE — ASSESSMENT & PLAN NOTE
Gertrude Stratford XT DR MRI.  Remaining battery longevity is 12.0 years.  DDD  bpm.  Paced  ms, sensed  ms.  Upper sense 130 bpm.  RV 0.5volts @ 0.4ms, R wave 13.0 mV and lead impedance 512 ohms. Atrial threshold 0.75V @ 0.4ms, P wave 2.1 mV and lead impedance 533 ohms.. Atrial pacing 30 %. Ventricular pacing 86 %.  No changes were made in the program parameters of the device.

## 2020-03-16 ASSESSMENT — ENCOUNTER SYMPTOMS
PALPITATIONS: 0
COUGH: 0
DYSPNEA ON EXERTION: 0
SHORTNESS OF BREATH: 0
WEAKNESS: 0

## 2020-03-18 NOTE — ASSESSMENT & PLAN NOTE
Still with some mild dyspnea. Suspect she has a significant component of deconditioning and have advised her to continue exercising. She has minimal symptoms with normal activities.

## 2020-07-03 LAB
ALBUMIN SERPL-MCNC: 4.2 G/DL (ref 3.6–5.1)
ALBUMIN/GLOB SERPL: 1.6 (CALC) (ref 1–2.5)
ALP SERPL-CCNC: 87 U/L (ref 37–153)
ALT SERPL-CCNC: 13 U/L (ref 6–29)
AST SERPL-CCNC: 23 U/L (ref 10–35)
BILIRUB SERPL-MCNC: 0.6 MG/DL (ref 0.2–1.2)
BUN SERPL-MCNC: 12 MG/DL (ref 7–25)
BUN/CREAT SERPL: ABNORMAL (CALC) (ref 6–22)
CALCIUM SERPL-MCNC: 9.2 MG/DL (ref 8.6–10.4)
CHLORIDE SERPL-SCNC: 108 MMOL/L (ref 98–110)
CHOLEST SERPL-MCNC: 162 MG/DL
CHOLEST/HDLC SERPL: 2.2 (CALC)
CK SERPL-CCNC: 35 U/L (ref 29–143)
CO2 SERPL-SCNC: 26 MMOL/L (ref 20–32)
CREAT SERPL-MCNC: 0.85 MG/DL (ref 0.6–0.93)
GLOBULIN SER CALC-MCNC: 2.6 G/DL (CALC) (ref 1.9–3.7)
GLUCOSE SERPL-MCNC: 106 MG/DL (ref 65–99)
HDLC SERPL-MCNC: 74 MG/DL
LDLC SERPL CALC-MCNC: 70 MG/DL (CALC)
NONHDLC SERPL-MCNC: 88 MG/DL (CALC)
POTASSIUM SERPL-SCNC: 4.3 MMOL/L (ref 3.5–5.3)
PROT SERPL-MCNC: 6.8 G/DL (ref 6.1–8.1)
QUEST EGFR NON-AFR. AMERICAN: 67 ML/MIN/1.73M2
SODIUM SERPL-SCNC: 141 MMOL/L (ref 135–146)
TRIGL SERPL-MCNC: 95 MG/DL

## 2020-07-09 ENCOUNTER — TELEPHONE (OUTPATIENT)
Dept: CARDIOLOGY | Facility: CLINIC | Age: 75
End: 2020-07-09

## 2020-07-09 NOTE — PROGRESS NOTES
Tomasz Courtney MD, [facility]    Director, Clinical Cardiology-[facility] and  [facility]    The Neno Canela III Chair in Innovative Cardiology    Clinical Associate Professor of Medicine  [facility]  [facility]  [facility]  [facility]  [address] [facility] [facility]  [place] [address]    TEL  [phone]  FAX: [phone]  EMAIL: [email or web address]   July 10, 2020      Anitra Way MD  [address] [place] [address]      Noé Moncada MD  Inteventional Cardiology  [facility]    Jermaine Carolina MD  Electrophysiology  [facility], ``D`` Section  [facility]        Jennifer Sams  1945      Dear Colleagues,      I saw Jennifer Sams in my office today on July 10, 2020.    She is a 75 y.o. 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    She still experiences exertional dyspnea which has never really resolved after her coronary interventions.  It occurs when climbing stairs especially when carrying heavy objects going up stairs.  It has not occurred at rest.  There was some improvement in her exertional dyspnea when pacemaker adjustments were made in the past.    She has an appointment scheduled to see a pulmonologist at [facility] to see if her exertional dyspnea may be related to her lungs although she has never been a smoker.    She saw Dr. Carolina on 3/10/2020 who interrogated her pacemaker and found it to be functioning normally.  He was pacing atrially 30% of the time  "and ventricularly 86% of the time.     She continues to experience substantial bruisability on her dual antiplatelet therapy but has had no melena, hematuria, or rectal bleeding.  She has completed a course of dual antiplatelet therapy.    She has lost about 5 pounds since her last visit.    She has been taking her rosuvastatin 20 mg a day regularly without side effects such as myalgias or significant muscle cramping.     ALLERGIES:  Allergies   Allergen Reactions   • Ace Inhibitors Other (see comments)     cough   • Atorvastatin      Nausea and can'trecall   • Brilinta [Ticagrelor]      SOB   • Codeine      Nausea and Vomiting   • Dilaudid [Hydromorphone]      Nausea & vomiting   • Morphine Sulfate Nausea Only and GI intolerance   • Onion      Severe abdominal pain   • Oxycodone      Nausea & vomiting, dizziness        I have reviewed the problem list, allergies, medications and social history, and they are up to date in the electronic record as of the current encounter.     ROS: The remainder of the review of systems is otherwise negative in detail for significant complaints.    No changes in her medications have been made since the last visit.    PHYSICAL EXAM:  Visit Vitals  /78 (BP Location: Left upper arm, Patient Position: Sitting)   Pulse 69   Resp 16   Ht 1.702 m (5' 7\")   Wt 62.6 kg (138 lb)   BMI 21.61 kg/m²     General: well-developed, well-nourished, appears well, no acute distress  HEENT: sclera anicteric, conjunctiva pink, no xanthelasma, neck supple, no thyromegaly  Chest: Well-healed pacemaker generator incision left upper chest wall, clear to auscultation, symmetrical expansion, no scoliosis   Heart: Apical impulse normally situated, regular rhythm, normal S1, normal S2, no gallops, no murmur  JVP: normal jugular venous pressure, negative AJR  Vascular: carotid pulses: intact bilaterally, no bruit, peripheral pulses: intact bilaterally  Abd: soft, non-tender, no hepatosplenomegaly, abdominal " aorta not palpable  Ext: Small ecchymosis right shin, no edema, no clubbing, no cyanosis  Skin: warm, dry, no ecchymoses  Neuro: alert, oriented x 3, normal muscle strength  Psychiatric: normal affect, normal judgment, normal insight and normal memory      LABS:   Ref. Range 7/2/2020 12:08   Sodium Latest Ref Range: 135 - 146 mmol/L 141   Potassium Latest Ref Range: 3.5 - 5.3 mmol/L 4.3   Chloride Latest Ref Range: 98 - 110 mmol/L 108   CO2 Latest Ref Range: 20 - 32 mmol/L 26   BUN Latest Ref Range: 7 - 25 mg/dL 12   Creatinine Latest Ref Range: 0.60 - 0.93 mg/dL 0.85   Glucose Latest Ref Range: 65 - 99 mg/dL 106 (H)   Calcium Latest Ref Range: 8.6 - 10.4 mg/dL 9.2   AST (SGOT) Latest Ref Range: 10 - 35 U/L 23   ALT (SGPT) Latest Ref Range: 6 - 29 U/L 13   Alkaline Phosphatase Latest Ref Range: 37 - 153 U/L 87   Total Protein Latest Ref Range: 6.1 - 8.1 g/dL 6.8   Albumin Latest Ref Range: 3.6 - 5.1 g/dL 4.2   Bilirubin, Total Latest Ref Range: 0.2 - 1.2 mg/dL 0.6   eGFR Latest Ref Range: > OR = 60 mL/min/1.73m2 67   Bun/Creatinine Ratio Latest Ref Range: 6 - 22 (calc) NOT APPLICABLE   Globulin Latest Ref Range: 1.9 - 3.7 g/dL (calc) 2.6   Cholesterol Latest Ref Range: <200 mg/dL 162   Triglycerides Latest Ref Range: <150 mg/dL 95   HDL Latest Ref Range: > OR = 50 mg/dL 74   LDL Calculated Latest Units: mg/dL (calc) 70   Non-HDL, Calculated Latest Ref Range: <130 mg/dL (calc) 88   Chol/Hdlc Ratio Latest Ref Range: <5.0 (calc) 2.2   ALBUMIN/GLOBULIN RATIO Latest Ref Range: 1.0 - 2.5 (calc) 1.6   Creatine Kinase, Total Latest Ref Range: 29 - 143 U/L 35        ECG:      IMPRESSION & PLAN:  Non-STEMI 4/26/19; three-vessel CAD; status post FRANK x2 LAD 4/27/19; DESx3 RCA 4/29/19; FRANK x1 LCX OM2; patent stents and no new lesions 11/19:  She reports no recurrence of her anginal symptoms.  I discontinued low-dose aspirin and asked her to continue prasagrel because I believe that aspirin is more likely to produce  gastrointestinal bleeding.  If for easy bruisability continues to be problematic prasagrel can be discontinued and replaced by low-dose aspirin.    2:1 AV block; status post dual-chamber pacemaker 11/18/19:  This has been the likely cause of her worsening exertional dyspnea in the past which seems to have improved with adjustment in her pacing parameters.  Her LVEDP was 1 at the time of her catheterization excluding elevated filling pressures as a contributing cause.    Exertional dyspnea:  I told her that if no obvious explanation is found for her dyspnea perhaps another coronary angiogram should be performed and if that does not reveal restenosis or new lesions perhaps a cardiopulmonary exercise test.    History of ischemic cardiomyopathy; resolved post revascularization (LVEF =50 to 55% 6/19):  Improvement in her LVEF confirmed that significant myocardial hibernation has resolved with revascularization.     Hypercholesterolemia:  She continues to tolerate her lipid lowering medications without significant side effects or hepatotoxicity. Her lipids are at goal. Her liver function studies and CPK are normal.  I will continue her present lipid lowering regimen and reassess her lab studies in 6 months.    The aforementioned changes were made in her regimen today.  Listed below you will find the regimen that she will be requested to continue:      Current Outpatient Medications:   •  nitroglycerin (NITROSTAT) 0.4 mg SL tablet, Place 1 tablet (0.4 mg total) under the tongue every 5 (five) minutes as needed for chest pain., Disp: 25 tablet, Rfl: 4  •  pantoprazole (PROTONIX) 40 mg EC tablet, Take 40 mg by mouth daily., Disp: , Rfl:   •  prasugreL (EFFIENT) 10 mg tablet, Take 1 tablet (10 mg total) by mouth daily., Disp: 90 tablet, Rfl: 4  •  rosuvastatin (CRESTOR) 20 mg tablet, Take 1 tablet (20 mg total) by mouth daily., Disp: 90 tablet, Rfl: 4    I will be retiring at the end of 2020. I have suggested that she  continue her cardiologic care with Dr. Moncada.    I have sincerely appreciated the opportunity to participate in the care of your patients. Please do not hesitate to contact me if any questions or problems arise until my senior care in mid December 2020.          With best wishes and warmest personal regards.      Sincerely,    Tomasz Courtney MD, [facility]    This document was generated utilizing voice recognition technology. A reasonable attempt at proofreading has been made to minimize errors but please excuse any typographical errors which may be present. Please call with any questions.

## 2020-07-09 NOTE — TELEPHONE ENCOUNTER
Message left for Jennifer Sams  to confirm 7.10.2020 in office visit. Asked Jennifer Sams to please call office to confirm appointment, verify demographics and complete travel screening.

## 2020-07-10 ENCOUNTER — OFFICE VISIT (OUTPATIENT)
Dept: CARDIOLOGY | Facility: CLINIC | Age: 75
End: 2020-07-10
Payer: MEDICARE

## 2020-07-10 VITALS
WEIGHT: 138 LBS | HEART RATE: 69 BPM | SYSTOLIC BLOOD PRESSURE: 130 MMHG | DIASTOLIC BLOOD PRESSURE: 78 MMHG | BODY MASS INDEX: 21.66 KG/M2 | RESPIRATION RATE: 16 BRPM | HEIGHT: 67 IN

## 2020-07-10 DIAGNOSIS — I44.1 MOBITZ TYPE 2 SECOND DEGREE ATRIOVENTRICULAR BLOCK: Chronic | ICD-10-CM

## 2020-07-10 DIAGNOSIS — I73.00 RAYNAUD'S DISEASE WITHOUT GANGRENE: Chronic | ICD-10-CM

## 2020-07-10 DIAGNOSIS — E78.00 HYPERCHOLESTEROLEMIA: Chronic | ICD-10-CM

## 2020-07-10 DIAGNOSIS — R06.09 DYSPNEA ON EXERTION: Chronic | ICD-10-CM

## 2020-07-10 DIAGNOSIS — I25.119 CORONARY ARTERY DISEASE INVOLVING NATIVE CORONARY ARTERY OF NATIVE HEART WITH ANGINA PECTORIS (CMS/HCC): Primary | Chronic | ICD-10-CM

## 2020-07-10 DIAGNOSIS — Z95.5 STATUS POST INSERTION OF DRUG ELUTING CORONARY ARTERY STENT: Chronic | ICD-10-CM

## 2020-07-10 DIAGNOSIS — I71.40 ABDOMINAL AORTIC ANEURYSM (AAA) WITHOUT RUPTURE (CMS/HCC): Chronic | ICD-10-CM

## 2020-07-10 PROCEDURE — 99214 OFFICE O/P EST MOD 30 MIN: CPT | Performed by: INTERNAL MEDICINE

## 2020-07-10 PROCEDURE — 93000 ELECTROCARDIOGRAM COMPLETE: CPT | Performed by: INTERNAL MEDICINE

## 2020-07-10 RX ORDER — PRASUGREL 10 MG/1
10 TABLET, FILM COATED ORAL DAILY
Qty: 90 TABLET | Refills: 4 | Status: SHIPPED | OUTPATIENT
Start: 2020-07-10 | End: 2020-07-20

## 2020-07-10 ASSESSMENT — ENCOUNTER SYMPTOMS
MYALGIAS: 0
PALPITATIONS: 1
BRUISES/BLEEDS EASILY: 1
ABDOMINAL PAIN: 0
DIZZINESS: 0
SHORTNESS OF BREATH: 1
COUGH: 1
FACIAL ASYMMETRY: 0
SPEECH DIFFICULTY: 0
BLOOD IN STOOL: 0
ANAL BLEEDING: 0
LIGHT-HEADEDNESS: 1

## 2020-07-10 NOTE — PROGRESS NOTES
"Subjective      Patient ID: Jennifer Sams is a 75 y.o. female.  1945      HPI    The following have been reviewed and updated as appropriate in this visit:  Tobacco  Allergies  Meds  Problems  Med Hx  Surg Hx  Fam Hx  Soc Hx        Review of Systems   HENT: Positive for nosebleeds (daily).    Respiratory: Positive for cough (with allergies) and shortness of breath (with climbing stairs or working outside - worsening).    Cardiovascular: Positive for palpitations (with climbing more than 1 flight of stairs, resolves within 5-10 min). Negative for leg swelling.        No orthopnea or PND   Gastrointestinal: Negative for abdominal pain, anal bleeding and blood in stool.   Musculoskeletal: Negative for myalgias.   Neurological: Positive for light-headedness (with bending over, resolves immediately with standing up). Negative for dizziness, syncope, facial asymmetry and speech difficulty.   Hematological: Bruises/bleeds easily.       Objective     Vitals:    07/10/20 1111   BP: 130/78   BP Location: Left upper arm   Patient Position: Sitting   Pulse: 69   Resp: 16   Weight: 62.6 kg (138 lb)   Height: 1.702 m (5' 7\")     Body mass index is 21.61 kg/m².    Physical Exam    Assessment/Plan   Diagnoses and all orders for this visit:    Coronary artery disease involving native coronary artery of native heart with angina pectoris (CMS/HCC) (Primary)  -     ECG 12 LEAD-OFFICE PERFORMED    Status post insertion of drug eluting coronary artery stent    Hypercholesterolemia    Mobitz type 2 second degree atrioventricular block    Dyspnea on exertion    Raynaud's disease without gangrene    Abdominal aortic aneurysm (AAA) without rupture (CMS/HCC)        "

## 2020-07-10 NOTE — LETTER
Tomasz Courtney MD, [facility]    Director, Clinical Cardiology-[facility] and  [facility]    The Neno Canela III Chair in Innovative Cardiology    Clinical Associate Professor of Medicine  [facility]  [facility]  The [facility] \[facility] [place] [facility]\""ilion  [facility] Level  [address]  [place] [address]    TEL  [phone]  FAX: [phone]  EMAIL: [email or web address]   July 10, 2020      Anitra Way MD  [address] [place] [address]      Noé Moncada MD  Inteventional Cardiology  [facility]    Jermaine Carloina MD  Electrophysiology  [facility], ``D`` Section  [facility]        Jennifer Sams  1945      Dear Colleagues,      I saw Jennifer Sams in my office today on July 10, 2020.    She is a 75 y.o. 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    She still experiences exertional dyspnea which has never really resolved after her coronary interventions.  It occurs when climbing stairs especially when carrying heavy objects going up stairs.  It has not occurred at rest.  There was some improvement in her exertional dyspnea when pacemaker adjustments were made in the past.    She has an appointment scheduled to see a pulmonologist at [facility] to see if her exertional dyspnea may be related to her lungs although she has never been a smoker.    She saw Dr. Carolina on 3/10/2020 who interrogated her pacemaker and found it to be functioning normally.  He was pacing atrially 30% of the time  "and ventricularly 86% of the time.     She continues to experience substantial bruisability on her dual antiplatelet therapy but has had no melena, hematuria, or rectal bleeding.  She has completed a course of dual antiplatelet therapy.    She has lost about 5 pounds since her last visit.    She has been taking her rosuvastatin 20 mg a day regularly without side effects such as myalgias or significant muscle cramping.     ALLERGIES:  Allergies   Allergen Reactions   • Ace Inhibitors Other (see comments)     cough   • Atorvastatin      Nausea and can'trecall   • Brilinta [Ticagrelor]      SOB   • Codeine      Nausea and Vomiting   • Dilaudid [Hydromorphone]      Nausea & vomiting   • Morphine Sulfate Nausea Only and GI intolerance   • Onion      Severe abdominal pain   • Oxycodone      Nausea & vomiting, dizziness        I have reviewed the problem list, allergies, medications and social history, and they are up to date in the electronic record as of the current encounter.     ROS: The remainder of the review of systems is otherwise negative in detail for significant complaints.    No changes in her medications have been made since the last visit.    PHYSICAL EXAM:  Visit Vitals  /78 (BP Location: Left upper arm, Patient Position: Sitting)   Pulse 69   Resp 16   Ht 1.702 m (5' 7\")   Wt 62.6 kg (138 lb)   BMI 21.61 kg/m²     General: well-developed, well-nourished, appears well, no acute distress  HEENT: sclera anicteric, conjunctiva pink, no xanthelasma, neck supple, no thyromegaly  Chest: Well-healed pacemaker generator incision left upper chest wall, clear to auscultation, symmetrical expansion, no scoliosis   Heart: Apical impulse normally situated, regular rhythm, normal S1, normal S2, no gallops, no murmur  JVP: normal jugular venous pressure, negative AJR  Vascular: carotid pulses: intact bilaterally, no bruit, peripheral pulses: intact bilaterally  Abd: soft, non-tender, no hepatosplenomegaly, abdominal " aorta not palpable  Ext: Small ecchymosis right shin, no edema, no clubbing, no cyanosis  Skin: warm, dry, no ecchymoses  Neuro: alert, oriented x 3, normal muscle strength  Psychiatric: normal affect, normal judgment, normal insight and normal memory      LABS:   Ref. Range 7/2/2020 12:08   Sodium Latest Ref Range: 135 - 146 mmol/L 141   Potassium Latest Ref Range: 3.5 - 5.3 mmol/L 4.3   Chloride Latest Ref Range: 98 - 110 mmol/L 108   CO2 Latest Ref Range: 20 - 32 mmol/L 26   BUN Latest Ref Range: 7 - 25 mg/dL 12   Creatinine Latest Ref Range: 0.60 - 0.93 mg/dL 0.85   Glucose Latest Ref Range: 65 - 99 mg/dL 106 (H)   Calcium Latest Ref Range: 8.6 - 10.4 mg/dL 9.2   AST (SGOT) Latest Ref Range: 10 - 35 U/L 23   ALT (SGPT) Latest Ref Range: 6 - 29 U/L 13   Alkaline Phosphatase Latest Ref Range: 37 - 153 U/L 87   Total Protein Latest Ref Range: 6.1 - 8.1 g/dL 6.8   Albumin Latest Ref Range: 3.6 - 5.1 g/dL 4.2   Bilirubin, Total Latest Ref Range: 0.2 - 1.2 mg/dL 0.6   eGFR Latest Ref Range: > OR = 60 mL/min/1.73m2 67   Bun/Creatinine Ratio Latest Ref Range: 6 - 22 (calc) NOT APPLICABLE   Globulin Latest Ref Range: 1.9 - 3.7 g/dL (calc) 2.6   Cholesterol Latest Ref Range: <200 mg/dL 162   Triglycerides Latest Ref Range: <150 mg/dL 95   HDL Latest Ref Range: > OR = 50 mg/dL 74   LDL Calculated Latest Units: mg/dL (calc) 70   Non-HDL, Calculated Latest Ref Range: <130 mg/dL (calc) 88   Chol/Hdlc Ratio Latest Ref Range: <5.0 (calc) 2.2   ALBUMIN/GLOBULIN RATIO Latest Ref Range: 1.0 - 2.5 (calc) 1.6   Creatine Kinase, Total Latest Ref Range: 29 - 143 U/L 35        ECG:      IMPRESSION & PLAN:  Non-STEMI 4/26/19; three-vessel CAD; status post FRANK x2 LAD 4/27/19; DESx3 RCA 4/29/19; FRANK x1 LCX OM2; patent stents and no new lesions 11/19:  She reports no recurrence of her anginal symptoms.  I discontinued low-dose aspirin and asked her to continue prasagrel because I believe that aspirin is more likely to produce  gastrointestinal bleeding.  If for easy bruisability continues to be problematic prasagrel can be discontinued and replaced by low-dose aspirin.    2:1 AV block; status post dual-chamber pacemaker 11/18/19:  This has been the likely cause of her worsening exertional dyspnea in the past which seems to have improved with adjustment in her pacing parameters.  Her LVEDP was 1 at the time of her catheterization excluding elevated filling pressures as a contributing cause.    Exertional dyspnea:  I told her that if no obvious explanation is found for her dyspnea perhaps another coronary angiogram should be performed and if that does not reveal restenosis or new lesions perhaps a cardiopulmonary exercise test.    History of ischemic cardiomyopathy; resolved post revascularization (LVEF =50 to 55% 6/19):  Improvement in her LVEF confirmed that significant myocardial hibernation has resolved with revascularization.     Hypercholesterolemia:  She continues to tolerate her lipid lowering medications without significant side effects or hepatotoxicity. Her lipids are at goal. Her liver function studies and CPK are normal.  I will continue her present lipid lowering regimen and reassess her lab studies in 6 months.    The aforementioned changes were made in her regimen today.  Listed below you will find the regimen that she will be requested to continue:      Current Outpatient Medications:   •  nitroglycerin (NITROSTAT) 0.4 mg SL tablet, Place 1 tablet (0.4 mg total) under the tongue every 5 (five) minutes as needed for chest pain., Disp: 25 tablet, Rfl: 4  •  pantoprazole (PROTONIX) 40 mg EC tablet, Take 40 mg by mouth daily., Disp: , Rfl:   •  prasugreL (EFFIENT) 10 mg tablet, Take 1 tablet (10 mg total) by mouth daily., Disp: 90 tablet, Rfl: 4  •  rosuvastatin (CRESTOR) 20 mg tablet, Take 1 tablet (20 mg total) by mouth daily., Disp: 90 tablet, Rfl: 4    I will be retiring at the end of 2020. I have suggested that she  continue her cardiologic care with Dr. Moncada.    I have sincerely appreciated the opportunity to participate in the care of your patients. Please do not hesitate to contact me if any questions or problems arise until my intermediate in mid December 2020.          With best wishes and warmest personal regards.      Sincerely,    Tomasz Courtney MD, [facility]    This document was generated utilizing voice recognition technology. A reasonable attempt at proofreading has been made to minimize errors but please excuse any typographical errors which may be present. Please call with any questions.

## 2020-07-30 ENCOUNTER — TELEPHONE (OUTPATIENT)
Dept: SCHEDULING | Facility: CLINIC | Age: 75
End: 2020-07-30

## 2020-07-30 NOTE — TELEPHONE ENCOUNTER
Pt is requesting to speak with Alicia. Pt states she saw a Lung Doctor yesterday Dr Rosales with [facility] . Pt states she has a question regarding the medication that Dr Rosales wants to  Prescribe her. Pt can be reached at [phone]

## 2020-07-30 NOTE — TELEPHONE ENCOUNTER
I spoke to the patient at length.  She saw the pulmonologist yesterday at [facility] and I reviewed his note.  She has mild asthma and he advised Singulair.  She read the package insert and went online and she is very nervous about taking Singulair.  I told her that she can certainly try it with her medications to see if it helps.  She does not really know what she is going to do and she'll think about it.

## 2020-09-10 ASSESSMENT — ENCOUNTER SYMPTOMS
GASTROINTESTINAL NEGATIVE: 1
WEAKNESS: 0
DYSPNEA ON EXERTION: 0
EYES NEGATIVE: 1
SHORTNESS OF BREATH: 0
PSYCHIATRIC NEGATIVE: 1
PALPITATIONS: 0
MUSCULOSKELETAL NEGATIVE: 1
COUGH: 0
ENDOCRINE NEGATIVE: 1

## 2020-09-10 NOTE — ASSESSMENT & PLAN NOTE
She recently saw Dr Courtney who recommended follow up with pulmonary at [facility].  She was told that she may have mild asthma, and was prescribed Singulair.  She has not started this as she is concerned about potential cardiac side effects.  She also does not feel that she truly has mild asthma.  She may seek a second opinion.    I did tell the patient that I would not have any cardiac concerns regarding the addition of Singulair at this time.  It certainly is worthwhile to perhaps give this a trial to see if there is any improvement.

## 2020-09-10 NOTE — PROGRESS NOTES
Electrophysiology  Outpatient Note         HPI   Jennifer Sams is a 75 y.o. female who is seen today status post dual-chamber pacemaker on November 18, 2019 indicated for 2:1 AV block.  She initially still had symptoms of dyspnea, but these were improved in the past with with optimization of her AV delays.  Her symptoms of dyspnea continue, at this point do not appear to be arrhythmia related.    Past Medical History:   Diagnosis Date   • AAA (abdominal aortic aneurysm) (CMS/HCC)    • Arthritis    • Elevated blood pressure reading without diagnosis of hypertension 4/19/2019   • Epistaxis 1/6/2020   • GERD (gastroesophageal reflux disease) 4/19/2019   • Glaucoma 4/19/2019   • Heart murmur    • Hiatal hernia    • History of hip replacement, total, right 4/19/2019    Right hip 9/ 2016 and revision 2017    • History of rheumatic fever as a child 4/19/2019   • Hypercholesterolemia 4/19/2019   • Ischemic cardiomyopathy 5/9/2019   • Osteoarthritis of multiple joints 4/19/2019   • Osteoporosis    • Pacemaker 12/9/2019   • Raynaud's disease 4/19/2019   • RSD (reflex sympathetic dystrophy) 4/19/2019    Of left foot   • SOB (shortness of breath) 12/10/2019   • Status post insertion of drug eluting coronary artery stent 5/9/2019       Past Surgical History:   Procedure Laterality Date   • CHOLECYSTECTOMY OPEN     • CORONARY ANGIOPLASTY WITH STENT PLACEMENT  04/29/2019    of LAD   • CORONARY ANGIOPLASTY WITH STENT PLACEMENT  04/30/2019    of RCA   • DILATION AND CURETTAGE OF UTERUS     • EYE SURGERY      RIGHT CATARACT   • FOOT SURGERY Left    • JOINT REPLACEMENT Right 09/2016    total hip   • REVISION TOTAL HIP ARTHROPLASTY Right 2017   • TONSILLECTOMY         Allergies: Ace inhibitors, Atorvastatin, Brilinta [ticagrelor], Codeine, Dilaudid [hydromorphone], Morphine sulfate, Onion, and Oxycodone    Current Outpatient Medications   Medication Sig Dispense Refill   • nitroglycerin (NITROSTAT) 0.4 mg SL tablet Place 1  tablet (0.4 mg total) under the tongue every 5 (five) minutes as needed for chest pain. 25 tablet 4   • pantoprazole (PROTONIX) 40 mg EC tablet Take 40 mg by mouth daily.     • prasugreL (EFFIENT) 10 mg tablet Take 1 tablet (10 mg total) by mouth once daily. 90 tablet 3   • rosuvastatin (CRESTOR) 20 mg tablet Take 1 tablet (20 mg total) by mouth once daily. 90 tablet 3     No current facility-administered medications for this visit.        Social History     Tobacco Use   • Smoking status: Never Smoker   • Smokeless tobacco: Never Used   Substance Use Topics   • Alcohol use: Not Currently   • Drug use: No       Family History   Problem Relation Age of Onset   • Congenital heart disease Biological Mother         noted at autopsy   • Heart attack Paternal Grandfather        Review of Systems   Constitution: Negative for malaise/fatigue.   HENT: Negative for congestion.    Eyes: Negative.    Cardiovascular: Negative for dyspnea on exertion and palpitations.   Respiratory: Negative for cough and shortness of breath.    Endocrine: Negative.    Hematologic/Lymphatic: Negative for bleeding problem.   Skin: Negative.    Musculoskeletal: Negative.    Gastrointestinal: Negative.    Genitourinary: Negative.    Neurological: Negative for weakness.   Psychiatric/Behavioral: Negative.        Objective     Vitals:    09/15/20 1336   BP: 128/76   Pulse: 71   Resp: 18       Physical Exam   Constitutional: She is oriented to person, place, and time. She appears well-developed and well-nourished.   HENT:   Head: Normocephalic and atraumatic.   Eyes: Conjunctivae are normal.   Neck: Normal range of motion. Neck supple.   Cardiovascular: Normal rate, regular rhythm and normal heart sounds.   No murmur heard.  Pulmonary/Chest: Effort normal and breath sounds normal.   Abdominal: Soft. Bowel sounds are normal.   Musculoskeletal: Normal range of motion. She exhibits no edema.   Neurological: She is alert and oriented to person, place, and  time. She has normal reflexes.   Skin: Skin is warm and dry.   Psychiatric: She has a normal mood and affect. Her behavior is normal. Judgment and thought content normal.        Labs   Lab Results   Component Value Date    WBC 6.60 01/17/2020    HGB 13.3 01/17/2020    HCT 42.4 01/17/2020     01/17/2020    CHOL 162 07/02/2020    TRIG 95 07/02/2020    HDL 74 07/02/2020    ALT 13 07/02/2020    AST 23 07/02/2020     07/02/2020    K 4.3 07/02/2020     07/02/2020    CREATININE 0.85 07/02/2020    BUN 12 07/02/2020    CO2 26 07/02/2020    TSH 1.86 01/17/2020    INR 1.0 11/18/2019     BP Readings from Last 3 Encounters:   09/15/20 128/76   07/10/20 130/78   03/10/20 120/72     PACEMAKER IMPLANTATION- 11/18/2019  Implanted Hardware:   The pacemaker generator is a GeneriMed, model W1 DR 01, serial #[id number].   The atrial pacing electrode is a Medtronic, model #5076, the serial #[id number].   The right ventricular pacing electrode is a Medtronic, model #5076, the serial #[id number].     Intraoperative Testing:   Right atrium: intrinsic amplitude 2.2 mV, impedance 1025 ohms, pacing threshold 0.8 V @0.5 msec.   Right ventricle: intrinsic amplitude 9.6 mV, impedance 864 ohms, pacing threshold 0.4 V @0.5 msec.    CARDIAC CATH- 11/15/2019  Patent stents in the proximal and mid RCA, mid and distal LAD and proximal LCx into large OM1.  50% ostial LCx stenosis, iFR negative (1.0)   Normal left cardiac filling pressure, LVEDP 1 mmHg    CV NM EXERCISE STRESS 11/13/2019  Exercise technetium tetrofosmin was terminated at 2 minutes due to hypotension (BP dropped from 110/80 to 80/60 mmHg) and bradycardia (HR dropped from 97 to 57 bpm). Patient was markedly short of breath during exercise. Perfusion was normal at the workload achieved but the test is nondiagnostic due to poor exercise tolerance and inability to achieve target heart rate.    ECG is non-diagnostic due to not achieving the target heart rate.  Gated imaging  reveals normal wall motion with hyperdynamic left ventricular systolic function.  The results were discussed with the consulting cardiology fellow.     ECHOCARDIOGRAM 11/29/2019  Technically limited study no gross chamber enlargement  Imaging done just to exclude pericardial effusion Limited echo study  No regional wall motion abnormality preserved ejection fraction 55 to 60%  Aortic sclerosis  Mitral tricuspid valves appear normal  Prominent anterior fat pad no evidence of pericardial effusion or tamponade    ECG Atrial sense, ventricular pace/fusion    Assessment/Plan   Problem List Items Addressed This Visit        Respiratory    SOB (shortness of breath) - Primary     She recently saw Dr Courtney who recommended follow up with pulmonary at [place].  She was told that she may have mild asthma, and was prescribed Singulair.  She has not started this as she is concerned about potential cardiac side effects.  She also does not feel that she truly has mild asthma.  She may seek a second opinion.    I did tell the patient that I would not have any cardiac concerns regarding the addition of Singulair at this time.  It certainly is worthwhile to perhaps give this a trial to see if there is any improvement.         Relevant Orders    ECG 12 LEAD-OFFICE PERFORMED (Completed)       Circulatory    Mobitz type 2 second degree atrioventricular block     Patient has a history of Mobitz 2 AV block.  This was noted with symptoms of dyspnea on exertion when she developed AV block with increased heart rates.  She is 96% ventricular paced.         Relevant Orders    ECG 12 LEAD-OFFICE PERFORMED (Completed)       Other    Status post insertion of drug eluting coronary artery stent     4/29/19: FRANK x2 LAD; 4/30/19 FRANK x4 RCA; 5/2/19 FRANK left circumflex         Relevant Orders    ECG 12 LEAD-OFFICE PERFORMED (Completed)    Pacemaker     The patient's Medtronic dual-chamber pacemaker was interrogated.  Device is programmed to a DDD mode at   bpm.    By longevity is estimated 12 years.  Atrial pacing threshold 0.5 V at 0.4 ms with a P wave amplitude 2 mV and impedance 494 ohms.  RV threshold 0.875 V at 0.4 ms with an R wave amplitude of 12 mV and impedance 456 ohms.  There have been no significant tacky arrhythmias.         Relevant Orders    ECG 12 LEAD-OFFICE PERFORMED (Completed)          Jermaine Carolina MD  9/26/2020

## 2020-09-15 ENCOUNTER — OFFICE VISIT (OUTPATIENT)
Dept: CARDIOLOGY | Facility: CLINIC | Age: 75
End: 2020-09-15
Payer: MEDICARE

## 2020-09-15 VITALS
SYSTOLIC BLOOD PRESSURE: 128 MMHG | HEART RATE: 71 BPM | WEIGHT: 135 LBS | BODY MASS INDEX: 21.19 KG/M2 | DIASTOLIC BLOOD PRESSURE: 76 MMHG | RESPIRATION RATE: 18 BRPM | HEIGHT: 67 IN

## 2020-09-15 DIAGNOSIS — R06.02 SOB (SHORTNESS OF BREATH): Primary | ICD-10-CM

## 2020-09-15 DIAGNOSIS — I44.1 MOBITZ TYPE 2 SECOND DEGREE ATRIOVENTRICULAR BLOCK: ICD-10-CM

## 2020-09-15 DIAGNOSIS — Z95.0 PACEMAKER: ICD-10-CM

## 2020-09-15 DIAGNOSIS — Z95.5 STATUS POST INSERTION OF DRUG ELUTING CORONARY ARTERY STENT: ICD-10-CM

## 2020-09-15 PROCEDURE — 99214 OFFICE O/P EST MOD 30 MIN: CPT | Performed by: INTERNAL MEDICINE

## 2020-09-15 PROCEDURE — 93000 ELECTROCARDIOGRAM COMPLETE: CPT | Performed by: INTERNAL MEDICINE

## 2020-09-26 NOTE — ASSESSMENT & PLAN NOTE
The patient's Medtronic dual-chamber pacemaker was interrogated.  Device is programmed to a DDD mode at  bpm.    By longevity is estimated 12 years.  Atrial pacing threshold 0.5 V at 0.4 ms with a P wave amplitude 2 mV and impedance 494 ohms.  RV threshold 0.875 V at 0.4 ms with an R wave amplitude of 12 mV and impedance 456 ohms.  There have been no significant tacky arrhythmias.

## 2020-09-26 NOTE — ASSESSMENT & PLAN NOTE
Patient has a history of Mobitz 2 AV block.  This was noted with symptoms of dyspnea on exertion when she developed AV block with increased heart rates.  She is 96% ventricular paced.

## 2020-12-22 ENCOUNTER — TRANSCRIBE ORDERS (OUTPATIENT)
Dept: SCHEDULING | Age: 75
End: 2020-12-22

## 2020-12-22 DIAGNOSIS — R06.00 DYSPNEA, UNSPECIFIED: Primary | ICD-10-CM

## 2020-12-24 ENCOUNTER — HOSPITAL ENCOUNTER (OUTPATIENT)
Dept: PULMONOLOGY | Facility: HOSPITAL | Age: 75
Discharge: HOME | End: 2020-12-24
Attending: INTERNAL MEDICINE
Payer: MEDICARE

## 2020-12-24 DIAGNOSIS — R06.00 DYSPNEA, UNSPECIFIED TYPE: ICD-10-CM

## 2020-12-24 PROCEDURE — 94726 PLETHYSMOGRAPHY LUNG VOLUMES: CPT

## 2020-12-24 PROCEDURE — 63700000 HC SELF-ADMINISTRABLE DRUG: Performed by: INTERNAL MEDICINE

## 2020-12-24 PROCEDURE — 94729 DIFFUSING CAPACITY: CPT

## 2020-12-24 PROCEDURE — 94060 EVALUATION OF WHEEZING: CPT

## 2020-12-24 RX ORDER — ALBUTEROL SULFATE 90 UG/1
4 INHALANT RESPIRATORY (INHALATION) ONCE
Status: COMPLETED | OUTPATIENT
Start: 2020-12-24 | End: 2020-12-24

## 2020-12-24 RX ADMIN — ALBUTEROL SULFATE 4 PUFF: 108 INHALANT RESPIRATORY (INHALATION) at 09:29

## 2021-01-13 ENCOUNTER — OFFICE VISIT (OUTPATIENT)
Dept: CARDIOLOGY | Facility: CLINIC | Age: 76
End: 2021-01-13
Payer: MEDICARE

## 2021-01-13 VITALS
WEIGHT: 143 LBS | HEIGHT: 67 IN | DIASTOLIC BLOOD PRESSURE: 62 MMHG | SYSTOLIC BLOOD PRESSURE: 126 MMHG | RESPIRATION RATE: 16 BRPM | HEART RATE: 67 BPM | OXYGEN SATURATION: 97 % | BODY MASS INDEX: 22.44 KG/M2

## 2021-01-13 DIAGNOSIS — E78.00 HYPERCHOLESTEROLEMIA: ICD-10-CM

## 2021-01-13 DIAGNOSIS — Z95.0 PACEMAKER: ICD-10-CM

## 2021-01-13 DIAGNOSIS — I25.5 ISCHEMIC CARDIOMYOPATHY: ICD-10-CM

## 2021-01-13 DIAGNOSIS — I25.119 CORONARY ARTERY DISEASE INVOLVING NATIVE CORONARY ARTERY OF NATIVE HEART WITH ANGINA PECTORIS (CMS/HCC): Primary | ICD-10-CM

## 2021-01-13 DIAGNOSIS — I25.10 CORONARY ARTERY DISEASE INVOLVING NATIVE HEART, ANGINA PRESENCE UNSPECIFIED, UNSPECIFIED VESSEL OR LESION TYPE: ICD-10-CM

## 2021-01-13 DIAGNOSIS — R06.02 SOB (SHORTNESS OF BREATH): ICD-10-CM

## 2021-01-13 PROCEDURE — 93000 ELECTROCARDIOGRAM COMPLETE: CPT | Performed by: INTERNAL MEDICINE

## 2021-01-13 PROCEDURE — 99214 OFFICE O/P EST MOD 30 MIN: CPT | Performed by: INTERNAL MEDICINE

## 2021-01-13 NOTE — LETTER
Rach Dc MD  [address]  [address]  [facility] [address]    Patient: Jennifer Sams  YOB: 1945  Date of Visit: 1/13/2021      Dear Dr. Dc:    Thank you for referring Jennifer Sams to me for evaluation. Below are my notes for this consultation.    If you have questions, please do not hesitate to call me. I look forward to following your patient along with you.         Sincerely,        Noé Moncada MD        CC: MD Antwan Albert, Noé CHATTERJEE MD  1/25/2021  8:57 AM  Signed    Cardiology Consult/New Patient       Noé Moncada MD  FAC, [facility], MRCP()  D.Card ([facility])  Interventional Cardiology         HPI      Mrs. Sams was seen in the office today for cardiac evaluation.  This is a pleasant 76-year-old lady was under care of Dr. Percy Courtney in the past.  Since his snf she transferred her cardiac care to me.  She has known coronary artery disease and I did multivessel coronary artery stenting in April 2019.  As far as cardiac symptoms she denies any chest pain.  She has exertional shortness of breath of unclear etiology.  Last cardiac catheterization in November 2019 showed patent stent with no new lesions.  Echocardiogram at the same time showed preserved LV systolic function with no regional wall motion abnormality.  Dr. Courtney discussed the options of repeat cardiac catheterization if no obvious etiology of her shortness of breath is identified.  She also saw Dr. Nayely Tidwell for pulmonary evaluation and underwent lung function test.  I do not have the test result and she has a follow-up appointment with Dr. Tidwell later this month.  She has seasonal allergy, cough and shortness of breath which improves with Sudafed.  She is not taking Sudafed regularly due to its known adverse cardiac side effect.  Although she denies any issues with Sudafed.          Medical History: Past medical history significant for coronary disease, status post FRANK x2  LAD 4/27/19; DESx3 RCA 4/29/19; FRANK x1 LCX OM2; patent stents and no new lesions 11/19: Hypertension, dyslipidemia, ischemic cardiomyopathy resolved, AAA (abdominal aortic aneurysm), Epistaxis (1/6/2020), GERD (gastroesophageal reflux disease) Glaucoma  Hiatal hernia, History of hip replacement, total, right , History of rheumatic fever as a child ,Osteoarthritis of multiple joints , Osteoporosis, Pacemaker, Raynaud's disease, RSD (reflex sympathetic dystrophy)      Surgical History:  has a past surgical history that includes Tonsillectomy; Eye surgery; Joint replacement (Right, 09/2016); Revision total hip arthroplasty (Right, 2017); Dilation and curettage of uterus; Foot surgery (Left); Cholecystectomy open;    Social History: reports that she has never smoked. She has never used smokeless tobacco. She reports previous alcohol use. She reports that she does not use drugs.     Family History: No pertinent family history    I am here a       Current Outpatient Medications   Medication Sig   • nitroglycerin (NITROSTAT) 0.4 mg SL tablet Place 1 tablet (0.4 mg total) under the tongue every 5 (five) minutes as needed for chest pain.   • pantoprazole (PROTONIX) 40 mg EC tablet Take 40 mg by mouth daily.   • prasugreL (EFFIENT) 10 mg tablet Take 1 tablet (10 mg total) by mouth once daily.   • rosuvastatin (CRESTOR) 20 mg tablet Take 1 tablet (20 mg total) by mouth once daily.     No current facility-administered medications for this visit.        Allergies:  Ace inhibitors, Atorvastatin, Brilinta [ticagrelor], Codeine, Dilaudid [hydromorphone], Morphine sulfate, Onion, and Oxycodone           Review of Systems  Constitution: Negative for chills, fever, weight gain and weight loss.   HENT: Negative for congestion, hearing loss and nosebleeds.    Eyes: Negative for visual disturbance.   Cardiovascular: Negative for chest pain, orthopnea, PND, palpitation, presyncope, syncope, lower extremity swelling, syncope.  Positive for  "exertional shortness of breath  Respiratory: Negative for cough and positive for shortness of breath.    Hematologic/Lymphatic: Does not bruise/bleed easily.   Skin: Negative for rash.   Musculoskeletal: Negative for muscle weakness and myalgias.   Gastrointestinal: Negative for abdominal pain, nausea, vomiting, anorexia, hematemesis, hematochezia, melena.   Genitourinary: Negative for dysuria, frequency and nocturia.   Neurological: Negative for dizziness, focal weakness, headaches, light-headedness, numbness and paresthesias.          Objective   Vitals:    01/13/21 1255   BP: 126/62   Pulse: 67   Resp: 16   SpO2: 97%   Weight: 64.9 kg (143 lb)   Height: 1.702 m (5' 7\")     Physical Exam  Constitutional: No acute distress.   HENT: Normocephalic and atraumatic. Moist mucous membranes.  Eyes: EOM are normal.   Neck: No JVD present.  Cardiovascular: Normal rate and regular rhythm.  Pulmonary/Chest: Normal effort and air entry. No wheezing, rales, ronchi.  Abdominal: Normal bowel sounds. Soft, non tender.   Musculoskeletal: No bony deformity.  Extremities: No edema.   Neurological: Alert and oriented to person, place, and time.   Skin: Skin is warm and dry. No rash.  Psychiatric: Normal mood, affect and behavior.           EKG    Labs   Lab Results   Component Value Date    WBC 6.60 01/17/2020    HGB 13.3 01/17/2020    HCT 42.4 01/17/2020     01/17/2020    CHOL 162 07/02/2020    TRIG 95 07/02/2020    HDL 74 07/02/2020    LDLCALC 70 07/02/2020    NONHDLCALC 93 11/12/2019    ALT 13 07/02/2020    AST 23 07/02/2020     07/02/2020    K 4.3 07/02/2020     07/02/2020    CREATININE 0.85 07/02/2020    BUN 12 07/02/2020    CO2 26 07/02/2020    TSH 1.86 01/17/2020    INR 1.0 11/18/2019     EKG  Sinus rhythm  Within normal limits    Cardiac Imaging   TRANSTHORACIC ECHO (TTE) LIMITED 11/29/2019    Narrative Technically limited study no gross chamber enlargement  Imaging done just to exclude pericardial effusion " Limited echo study  No regional wall motion abnormality preserved ejection fraction 55 to 60%  Aortic sclerosis  Mitral tricuspid valves appear normal  Prominent anterior fat pad no evidence of pericardial effusion or   tamponade            Assessment/Plan     Non-STEMI 4/26/19; three-vessel CAD; status post FRANK x2 LAD 4/27/19; DESx3 RCA 4/29/19; FRANK x1 LCX OM2; patent stents and no new lesions 11/19:  She reports no recurrence of her anginal symptoms.    She is only on prasagrel as antiplatelet therapy.  Aspirin is discontinued by Dr. Courtney due to recurrent epistaxis.  Continue on a statin.  Risk factors modification is discussed at length     2:1 AV block; status post dual-chamber pacemaker 11/18/19:  This has been the likely cause of her worsening exertional dyspnea in the past which seems to have improved with adjustment in her pacing parameters.  Her LVEDP was 1 at the time of her catheterization excluding elevated filling pressures as a contributing cause.     Exertional dyspnea:  Unclear etiology.  I will schedule her for regadenoson nuclear stress test for ischemic assessment.  This is to rule out shortness of breath as anginal equivalent.  Her last cardiac catheterization in November 2019 showed patent stent with no new hemodynamically significant disease.  LVEDP was also low.  I told her that if no obvious explanation is found for her dyspnea perhaps another coronary angiogram should be performed and if that does not reveal restenosis or new lesions perhaps a cardiopulmonary exercise test.  She also has an appointment with Dr. Nayely Tidwell for pulmonary evaluation.     History of ischemic cardiomyopathy; resolved post revascularization (LVEF =50 to 55% 6/19):  Repeat echo in November 2019 showed ejection fraction of 55 to 60%.     Hypercholesterolemia:  She continues to tolerate her lipid lowering medications without significant side effects or hepatotoxicity. Her lipids are at goal. Her liver function  studies and CPK are normal.  I will continue her present lipid lowering regimen and reassess her lab studies in 6 months     Amid TEE Moncada MD    This document was generated utilizing voice recognition technology. A reasonable attempt at proofreading has been made to minimize errors but please excuse any typographical errors which may be present. Please call with any questions.

## 2021-01-13 NOTE — PROGRESS NOTES
Noé Moncada MD  FACC, FSCAI, MRCP([place])  D.Card ([facility])  Interventional Cardiology       Reason for visit : Follow up  HPI   Jennifer Sams is a 76 y.o. female who presents to the office for cardiovascular follow up.           Problem list:  Coronary artery disease involving native coronary artery of native heart with angina pectoris      Status post insertion of drug eluting coronary artery stent     Hypercholesterolemia     Mobitz type 2 second degree atrioventricular block     Dyspnea on exertion     Raynaud's disease without gangrene     Abdominal aortic aneurysm (AAA) without rupture           Current Outpatient Medications   Medication Sig   • nitroglycerin (NITROSTAT) 0.4 mg SL tablet Place 1 tablet (0.4 mg total) under the tongue every 5 (five) minutes as needed for chest pain.   • pantoprazole (PROTONIX) 40 mg EC tablet Take 40 mg by mouth daily.   • prasugreL (EFFIENT) 10 mg tablet Take 1 tablet (10 mg total) by mouth once daily.   • rosuvastatin (CRESTOR) 20 mg tablet Take 1 tablet (20 mg total) by mouth once daily.     No current facility-administered medications for this visit.         Allergies:  Ace inhibitors, Atorvastatin, Brilinta [ticagrelor], Codeine, Dilaudid [hydromorphone], Morphine sulfate, Onion, and Oxycodone       Review of Systems  Constitution: Negative for chills, fever, weight gain and weight loss.   HENT: Negative for congestion, hearing loss and nosebleeds.    Eyes: Negative for visual disturbance.   Cardiovascular: Negative for chest pain, dyspnea on exertion, orthopnea, PND, palpitation, presyncope, syncope, claudication.    Respiratory: Negative for cough and shortness of breath.    Hematologic/Lymphatic: Does not bruise/bleed easily.   Skin: Negative for rash.   Musculoskeletal: Negative for muscle weakness and myalgias.   Gastrointestinal: Negative for abdominal pain, nausea, vomiting, anorexia, hematemesis, hematochezia, melena.   Genitourinary: Negative for  dysuria, frequency and nocturia.   Neurological: Negative for dizziness, focal weakness, headaches, light-headedness, numbness and paresthesias.        Objective   There were no vitals filed for this visit.  Physical Exam  Constitutional: No acute distress.   HENT: Normocephalic and atraumatic. Moist mucous membranes.  Eyes: EOM are normal.   Neck: No JVD present.  Cardiovascular: Normal rate and regular rhythm.   Pulmonary/Chest: Normal effort and air entry. No wheezing, rales, ronchi.  Abdominal: Normal bowel sounds. Soft, non tender.   Musculoskeletal: No bony deformity.  Extremities: No edema.   Neurological: Alert and oriented to person, place, and time.   Skin: Skin is warm and dry. No rash.  Psychiatric: Normal mood, affect and behavior.        Labs:   Lab Results   Component Value Date    WBC 6.60 01/17/2020    HGB 13.3 01/17/2020    HCT 42.4 01/17/2020     01/17/2020    CHOL 162 07/02/2020    TRIG 95 07/02/2020    HDL 74 07/02/2020    LDLCALC 70 07/02/2020    NONHDLCALC 93 11/12/2019    ALT 13 07/02/2020    AST 23 07/02/2020     07/02/2020    K 4.3 07/02/2020     07/02/2020    CREATININE 0.85 07/02/2020    BUN 12 07/02/2020    CO2 26 07/02/2020    TSH 1.86 01/17/2020    INR 1.0 11/18/2019       ECG :    Cardiac Imaging   TRANSTHORACIC ECHO (TTE) LIMITED 11/29/2019    Narrative Technically limited study no gross chamber enlargement  Imaging done just to exclude pericardial effusion Limited echo study  No regional wall motion abnormality preserved ejection fraction 55 to 60%  Aortic sclerosis  Mitral tricuspid valves appear normal  Prominent anterior fat pad no evidence of pericardial effusion or   tamponade       STRESS TEST :       Assessment/Plan      Non-STEMI 4/26/19; three-vessel CAD; status post FRANK x2 LAD 4/27/19; DESx3 RCA 4/29/19; FRANK x1 LCX OM2; patent stents and no new lesions 11/19:  She reports no recurrence of her anginal symptoms.  I discontinued low-dose aspirin and asked  her to continue prasagrel because I believe that aspirin is more likely to produce gastrointestinal bleeding.  If for easy bruisability continues to be problematic prasagrel can be discontinued and replaced by low-dose aspirin.     2:1 AV block; status post dual-chamber pacemaker 11/18/19:  This has been the likely cause of her worsening exertional dyspnea in the past which seems to have improved with adjustment in her pacing parameters.  Her LVEDP was 1 at the time of her catheterization excluding elevated filling pressures as a contributing cause.     Exertional dyspnea:  I told her that if no obvious explanation is found for her dyspnea perhaps another coronary angiogram should be performed and if that does not reveal restenosis or new lesions perhaps a cardiopulmonary exercise test.     History of ischemic cardiomyopathy; resolved post revascularization (LVEF =50 to 55% 6/19):  Improvement in her LVEF confirmed that significant myocardial hibernation has resolved with revascularization.     Hypercholesterolemia:  She continues to tolerate her lipid lowering medications without significant side effects or hepatotoxicity. Her lipids are at goal. Her liver function studies and CPK are normal.  I will continue her present lipid lowering regimen and reassess her lab studies in 6 months.    Follow up in     I thank you for allowing me to participate in the care of your patient.  If I can furnish you with any further details, please do not hesitate to contact me.     Amid TEE Moncada MD      This document was generated utilizing voice recognition technology. A reasonable attempt at proofreading has been made to minimize errors but please excuse any typographical errors which may be present. Please call with any questions.

## 2021-01-13 NOTE — PROGRESS NOTES
Cardiology Consult/New Patient       Noé Moncada MD  FACC, FSCAI, MRCP([place])  D.Card ([facility])  Interventional Cardiology         HPI      Mrs. Sams was seen in the office today for cardiac evaluation.  This is a pleasant 76-year-old lady was under care of Dr. Percy Courtney in the past.  Since his alf she transferred her cardiac care to me.  She has known coronary artery disease and I did multivessel coronary artery stenting in April 2019.  As far as cardiac symptoms she denies any chest pain.  She has exertional shortness of breath of unclear etiology.  Last cardiac catheterization in November 2019 showed patent stent with no new lesions.  Echocardiogram at the same time showed preserved LV systolic function with no regional wall motion abnormality.  Dr. Courtney discussed the options of repeat cardiac catheterization if no obvious etiology of her shortness of breath is identified.  She also saw Dr. Nayely Tidwell for pulmonary evaluation and underwent lung function test.  I do not have the test result and she has a follow-up appointment with Dr. Tidwell later this month.  She has seasonal allergy, cough and shortness of breath which improves with Sudafed.  She is not taking Sudafed regularly due to its known adverse cardiac side effect.  Although she denies any issues with Sudafed.          Medical History: Past medical history significant for coronary disease, status post FRANK x2 LAD 4/27/19; DESx3 RCA 4/29/19; FRANK x1 LCX OM2; patent stents and no new lesions 11/19: Hypertension, dyslipidemia, ischemic cardiomyopathy resolved, AAA (abdominal aortic aneurysm), Epistaxis (1/6/2020), GERD (gastroesophageal reflux disease) Glaucoma  Hiatal hernia, History of hip replacement, total, right , History of rheumatic fever as a child ,Osteoarthritis of multiple joints , Osteoporosis, Pacemaker, Raynaud's disease, RSD (reflex sympathetic dystrophy)      Surgical History:  has a past surgical history that includes  Tonsillectomy; Eye surgery; Joint replacement (Right, 09/2016); Revision total hip arthroplasty (Right, 2017); Dilation and curettage of uterus; Foot surgery (Left); Cholecystectomy open;    Social History: reports that she has never smoked. She has never used smokeless tobacco. She reports previous alcohol use. She reports that she does not use drugs.     Family History: No pertinent family history    I am here a       Current Outpatient Medications   Medication Sig   • nitroglycerin (NITROSTAT) 0.4 mg SL tablet Place 1 tablet (0.4 mg total) under the tongue every 5 (five) minutes as needed for chest pain.   • pantoprazole (PROTONIX) 40 mg EC tablet Take 40 mg by mouth daily.   • prasugreL (EFFIENT) 10 mg tablet Take 1 tablet (10 mg total) by mouth once daily.   • rosuvastatin (CRESTOR) 20 mg tablet Take 1 tablet (20 mg total) by mouth once daily.     No current facility-administered medications for this visit.        Allergies:  Ace inhibitors, Atorvastatin, Brilinta [ticagrelor], Codeine, Dilaudid [hydromorphone], Morphine sulfate, Onion, and Oxycodone           Review of Systems  Constitution: Negative for chills, fever, weight gain and weight loss.   HENT: Negative for congestion, hearing loss and nosebleeds.    Eyes: Negative for visual disturbance.   Cardiovascular: Negative for chest pain, orthopnea, PND, palpitation, presyncope, syncope, lower extremity swelling, syncope.  Positive for exertional shortness of breath  Respiratory: Negative for cough and positive for shortness of breath.    Hematologic/Lymphatic: Does not bruise/bleed easily.   Skin: Negative for rash.   Musculoskeletal: Negative for muscle weakness and myalgias.   Gastrointestinal: Negative for abdominal pain, nausea, vomiting, anorexia, hematemesis, hematochezia, melena.   Genitourinary: Negative for dysuria, frequency and nocturia.   Neurological: Negative for dizziness, focal weakness, headaches, light-headedness, numbness and  "paresthesias.          Objective   Vitals:    01/13/21 1255   BP: 126/62   Pulse: 67   Resp: 16   SpO2: 97%   Weight: 64.9 kg (143 lb)   Height: 1.702 m (5' 7\")     Physical Exam  Constitutional: No acute distress.   HENT: Normocephalic and atraumatic. Moist mucous membranes.  Eyes: EOM are normal.   Neck: No JVD present.  Cardiovascular: Normal rate and regular rhythm.  Pulmonary/Chest: Normal effort and air entry. No wheezing, rales, ronchi.  Abdominal: Normal bowel sounds. Soft, non tender.   Musculoskeletal: No bony deformity.  Extremities: No edema.   Neurological: Alert and oriented to person, place, and time.   Skin: Skin is warm and dry. No rash.  Psychiatric: Normal mood, affect and behavior.           EKG    Labs   Lab Results   Component Value Date    WBC 6.60 01/17/2020    HGB 13.3 01/17/2020    HCT 42.4 01/17/2020     01/17/2020    CHOL 162 07/02/2020    TRIG 95 07/02/2020    HDL 74 07/02/2020    LDLCALC 70 07/02/2020    NONHDLCALC 93 11/12/2019    ALT 13 07/02/2020    AST 23 07/02/2020     07/02/2020    K 4.3 07/02/2020     07/02/2020    CREATININE 0.85 07/02/2020    BUN 12 07/02/2020    CO2 26 07/02/2020    TSH 1.86 01/17/2020    INR 1.0 11/18/2019     EKG  Sinus rhythm  Within normal limits    Cardiac Imaging   TRANSTHORACIC ECHO (TTE) LIMITED 11/29/2019    Narrative Technically limited study no gross chamber enlargement  Imaging done just to exclude pericardial effusion Limited echo study  No regional wall motion abnormality preserved ejection fraction 55 to 60%  Aortic sclerosis  Mitral tricuspid valves appear normal  Prominent anterior fat pad no evidence of pericardial effusion or   tamponade            Assessment/Plan     Non-STEMI 4/26/19; three-vessel CAD; status post FRANK x2 LAD 4/27/19; DESx3 RCA 4/29/19; FRANK x1 LCX OM2; patent stents and no new lesions 11/19:  She reports no recurrence of her anginal symptoms.    She is only on prasagrel as antiplatelet therapy.  Aspirin " is discontinued by Dr. Courtney due to recurrent epistaxis.  Continue on a statin.  Risk factors modification is discussed at length     2:1 AV block; status post dual-chamber pacemaker 11/18/19:  This has been the likely cause of her worsening exertional dyspnea in the past which seems to have improved with adjustment in her pacing parameters.  Her LVEDP was 1 at the time of her catheterization excluding elevated filling pressures as a contributing cause.     Exertional dyspnea:  Unclear etiology.  I will schedule her for regadenoson nuclear stress test for ischemic assessment.  This is to rule out shortness of breath as anginal equivalent.  Her last cardiac catheterization in November 2019 showed patent stent with no new hemodynamically significant disease.  LVEDP was also low.  I told her that if no obvious explanation is found for her dyspnea perhaps another coronary angiogram should be performed and if that does not reveal restenosis or new lesions perhaps a cardiopulmonary exercise test.  She also has an appointment with Dr. Nayely Tidwell for pulmonary evaluation.     History of ischemic cardiomyopathy; resolved post revascularization (LVEF =50 to 55% 6/19):  Repeat echo in November 2019 showed ejection fraction of 55 to 60%.     Hypercholesterolemia:  She continues to tolerate her lipid lowering medications without significant side effects or hepatotoxicity. Her lipids are at goal. Her liver function studies and CPK are normal.  I will continue her present lipid lowering regimen and reassess her lab studies in 6 months     Amid TEE Moncada MD    This document was generated utilizing voice recognition technology. A reasonable attempt at proofreading has been made to minimize errors but please excuse any typographical errors which may be present. Please call with any questions.

## 2021-01-25 ENCOUNTER — TELEPHONE (OUTPATIENT)
Dept: SCHEDULING | Facility: CLINIC | Age: 76
End: 2021-01-25

## 2021-01-25 DIAGNOSIS — I21.4 NSTEMI (NON-ST ELEVATED MYOCARDIAL INFARCTION) (CMS/HCC): ICD-10-CM

## 2021-01-25 DIAGNOSIS — R07.1 CHEST PAIN ON BREATHING: ICD-10-CM

## 2021-01-25 DIAGNOSIS — I25.119 CORONARY ARTERY DISEASE INVOLVING NATIVE CORONARY ARTERY OF NATIVE HEART WITH ANGINA PECTORIS (CMS/HCC): ICD-10-CM

## 2021-01-25 DIAGNOSIS — Z11.59 SCREENING FOR VIRAL DISEASE: ICD-10-CM

## 2021-01-25 DIAGNOSIS — R06.09 DOE (DYSPNEA ON EXERTION): Primary | ICD-10-CM

## 2021-01-25 NOTE — TELEPHONE ENCOUNTER
I spoke to the patient as well as Dr. Moncada.  I told her that she cannot swab her own nose for the Covid test.  I told her that she does need to wear a mask for catheterization.  She wants to proceed with catheterization.    Melanie can you please cancel the patient stress test per Dr. Moncada as she will be proceeding with catheterization.    Anastasiia or Pretty can you call the patient and set her up for a left and a right heart catheterization.  She has lab slips for a CBC and a BMP.  I will order a Covid test.

## 2021-01-25 NOTE — TELEPHONE ENCOUNTER
Dr Moncada patcalled stating Dr Moncada wanted a cath performed and she was not sure. She has changed her mind.  She thinks she was told a COVID test needs to be performed however she has serious sinus problems and will have other problems. Can she put swab up her own nose?   Does she need to have mask on during the cath? Please call her at [phone] to advise. She does not want to cx studies ordered until she gets questions answered.

## 2021-01-26 PROBLEM — R07.1 CHEST PAIN ON BREATHING: Status: ACTIVE | Noted: 2021-01-26

## 2021-01-26 PROBLEM — R06.09 DOE (DYSPNEA ON EXERTION): Status: ACTIVE | Noted: 2021-01-26

## 2021-02-01 ENCOUNTER — TELEPHONE (OUTPATIENT)
Dept: CARDIOLOGY | Facility: CLINIC | Age: 76
End: 2021-02-01

## 2021-02-01 NOTE — TELEPHONE ENCOUNTER
Patient of Dr Moncada- Patient calling. She is unable to get a ride in weather to get preadmission testing covid testing today due to weather. She is due for Cardiac Cath  2/5/21  Thank you

## 2021-02-01 NOTE — TELEPHONE ENCOUNTER
Jennifer Cordon called and needs to cancel and reschedule her testing for today as she does not have a ride here.  Please call her at [phone].  Thank you

## 2021-02-08 ENCOUNTER — APPOINTMENT (OUTPATIENT)
Dept: LAB | Facility: HOSPITAL | Age: 76
End: 2021-02-08
Attending: INTERNAL MEDICINE
Payer: MEDICARE

## 2021-02-08 DIAGNOSIS — I25.119 CORONARY ARTERY DISEASE INVOLVING NATIVE CORONARY ARTERY OF NATIVE HEART WITH ANGINA PECTORIS (CMS/HCC): ICD-10-CM

## 2021-02-08 DIAGNOSIS — Z11.59 SCREENING FOR VIRAL DISEASE: ICD-10-CM

## 2021-02-08 LAB
ALBUMIN SERPL-MCNC: 3.6 G/DL (ref 3.4–5)
ALP SERPL-CCNC: 73 IU/L (ref 35–126)
ALT SERPL-CCNC: 15 IU/L (ref 11–54)
ANION GAP SERPL CALC-SCNC: 8 MEQ/L (ref 3–15)
AST SERPL-CCNC: 24 IU/L (ref 15–41)
BASOPHILS # BLD: 0.04 K/UL (ref 0.01–0.1)
BASOPHILS NFR BLD: 0.6 %
BILIRUB DIRECT SERPL-MCNC: 0.1 MG/DL
BILIRUB SERPL-MCNC: 0.7 MG/DL (ref 0.3–1.2)
BUN SERPL-MCNC: 14 MG/DL (ref 8–20)
CALCIUM SERPL-MCNC: 9.4 MG/DL (ref 8.9–10.3)
CHLORIDE SERPL-SCNC: 108 MEQ/L (ref 98–109)
CHOLEST SERPL-MCNC: 162 MG/DL
CO2 SERPL-SCNC: 26 MEQ/L (ref 22–32)
CREAT SERPL-MCNC: 1 MG/DL (ref 0.6–1.1)
DIFFERENTIAL METHOD BLD: ABNORMAL
EOSINOPHIL # BLD: 0.12 K/UL (ref 0.04–0.36)
EOSINOPHIL NFR BLD: 1.9 %
ERYTHROCYTE [DISTWIDTH] IN BLOOD BY AUTOMATED COUNT: 14.1 % (ref 11.7–14.4)
GFR SERPL CREATININE-BSD FRML MDRD: 53.9 ML/MIN/1.73M*2
GLUCOSE SERPL-MCNC: 95 MG/DL (ref 70–99)
HCT VFR BLDCO AUTO: 42.5 % (ref 35–45)
HDLC SERPL-MCNC: 68 MG/DL
HDLC SERPL: 2.4 {RATIO}
HGB BLD-MCNC: 13.4 G/DL (ref 11.8–15.7)
IMM GRANULOCYTES # BLD AUTO: 0.01 K/UL (ref 0–0.08)
IMM GRANULOCYTES NFR BLD AUTO: 0.2 %
LDLC SERPL CALC-MCNC: 59 MG/DL
LYMPHOCYTES # BLD: 1.69 K/UL (ref 1.2–3.5)
LYMPHOCYTES NFR BLD: 27 %
MCH RBC QN AUTO: 27.7 PG (ref 28–33.2)
MCHC RBC AUTO-ENTMCNC: 31.5 G/DL (ref 32.2–35.5)
MCV RBC AUTO: 87.8 FL (ref 83–98)
MONOCYTES # BLD: 0.67 K/UL (ref 0.28–0.8)
MONOCYTES NFR BLD: 10.7 %
NEUTROPHILS # BLD: 3.74 K/UL (ref 1.7–7)
NEUTS SEG NFR BLD: 59.6 %
NONHDLC SERPL-MCNC: 94 MG/DL
NRBC BLD-RTO: 0 %
PDW BLD AUTO: 9.1 FL (ref 9.4–12.3)
PLATELET # BLD AUTO: 197 K/UL (ref 150–369)
POTASSIUM SERPL-SCNC: 3.9 MEQ/L (ref 3.6–5.1)
PROT SERPL-MCNC: 5.9 G/DL (ref 6–8.2)
RBC # BLD AUTO: 4.84 M/UL (ref 3.93–5.22)
SODIUM SERPL-SCNC: 142 MEQ/L (ref 136–144)
TRIGL SERPL-MCNC: 177 MG/DL (ref 30–149)
WBC # BLD AUTO: 6.27 K/UL (ref 3.8–10.5)

## 2021-02-08 PROCEDURE — 85025 COMPLETE CBC W/AUTO DIFF WBC: CPT

## 2021-02-08 PROCEDURE — 36415 COLL VENOUS BLD VENIPUNCTURE: CPT

## 2021-02-08 PROCEDURE — C9803 HOPD COVID-19 SPEC COLLECT: HCPCS

## 2021-02-08 PROCEDURE — U0003 INFECTIOUS AGENT DETECTION BY NUCLEIC ACID (DNA OR RNA); SEVERE ACUTE RESPIRATORY SYNDROME CORONAVIRUS 2 (SARS-COV-2) (CORONAVIRUS DISEASE [COVID-19]), AMPLIFIED PROBE TECHNIQUE, MAKING USE OF HIGH THROUGHPUT TECHNOLOGIES AS DESCRIBED BY CMS-2020-01-R: HCPCS

## 2021-02-08 PROCEDURE — 80061 LIPID PANEL: CPT

## 2021-02-08 PROCEDURE — 82248 BILIRUBIN DIRECT: CPT

## 2021-02-09 LAB — SARS-COV-2 RNA RESP QL NAA+PROBE: NOT DETECTED

## 2021-02-12 ENCOUNTER — HOSPITAL ENCOUNTER (OUTPATIENT)
Facility: HOSPITAL | Age: 76
Setting detail: HOSPITAL OUTPATIENT SURGERY
Discharge: HOME | End: 2021-02-12
Attending: INTERNAL MEDICINE | Admitting: INTERNAL MEDICINE
Payer: MEDICARE

## 2021-02-12 VITALS
WEIGHT: 138 LBS | DIASTOLIC BLOOD PRESSURE: 70 MMHG | RESPIRATION RATE: 15 BRPM | OXYGEN SATURATION: 98 % | HEART RATE: 70 BPM | SYSTOLIC BLOOD PRESSURE: 132 MMHG | BODY MASS INDEX: 21.66 KG/M2 | TEMPERATURE: 98.3 F | HEIGHT: 67 IN

## 2021-02-12 DIAGNOSIS — I21.4 NSTEMI (NON-ST ELEVATED MYOCARDIAL INFARCTION) (CMS/HCC): ICD-10-CM

## 2021-02-12 DIAGNOSIS — I25.119 CORONARY ARTERY DISEASE INVOLVING NATIVE CORONARY ARTERY OF NATIVE HEART WITH ANGINA PECTORIS (CMS/HCC): ICD-10-CM

## 2021-02-12 DIAGNOSIS — R07.1 CHEST PAIN ON BREATHING: ICD-10-CM

## 2021-02-12 DIAGNOSIS — R06.09 DOE (DYSPNEA ON EXERTION): ICD-10-CM

## 2021-02-12 LAB
POCT ACT-LR: >400 SEC (ref 113–149)
POCT OXYHGB: 74.1 % (ref 93–98)
POCT TEST: ABNORMAL
POCT TEST: ABNORMAL

## 2021-02-12 PROCEDURE — 63600000 HC DRUGS/DETAIL CODE: Performed by: INTERNAL MEDICINE

## 2021-02-12 PROCEDURE — 85347 COAGULATION TIME ACTIVATED: CPT | Performed by: INTERNAL MEDICINE

## 2021-02-12 PROCEDURE — 71000011 HC PACU PHASE 1 EA ADDL MIN: Performed by: INTERNAL MEDICINE

## 2021-02-12 PROCEDURE — 4A023N6 MEASUREMENT OF CARDIAC SAMPLING AND PRESSURE, RIGHT HEART, PERCUTANEOUS APPROACH: ICD-10-PCS | Performed by: INTERNAL MEDICINE

## 2021-02-12 PROCEDURE — B2111ZZ FLUOROSCOPY OF MULTIPLE CORONARY ARTERIES USING LOW OSMOLAR CONTRAST: ICD-10-PCS | Performed by: INTERNAL MEDICINE

## 2021-02-12 PROCEDURE — C1894 INTRO/SHEATH, NON-LASER: HCPCS | Performed by: INTERNAL MEDICINE

## 2021-02-12 PROCEDURE — 71000001 HC PACU PHASE 1 INITIAL 30MIN: Performed by: INTERNAL MEDICINE

## 2021-02-12 PROCEDURE — 99219 PR INITIAL OBSERVATION CARE/DAY 50 MINUTES: CPT | Mod: 25 | Performed by: INTERNAL MEDICINE

## 2021-02-12 PROCEDURE — 99152 MOD SED SAME PHYS/QHP 5/>YRS: CPT | Performed by: INTERNAL MEDICINE

## 2021-02-12 PROCEDURE — 93460 R&L HRT ART/VENTRICLE ANGIO: CPT | Performed by: INTERNAL MEDICINE

## 2021-02-12 PROCEDURE — 27200000 HC STERILE SUPPLY: Performed by: INTERNAL MEDICINE

## 2021-02-12 PROCEDURE — 93571 IV DOP VEL&/PRESS C FLO 1ST: CPT | Mod: 52 | Performed by: INTERNAL MEDICINE

## 2021-02-12 PROCEDURE — 25000000 HC PHARMACY GENERAL: Performed by: INTERNAL MEDICINE

## 2021-02-12 PROCEDURE — 93456 R HRT CORONARY ARTERY ANGIO: CPT | Performed by: INTERNAL MEDICINE

## 2021-02-12 PROCEDURE — 93456 R HRT CORONARY ARTERY ANGIO: CPT | Mod: 26 | Performed by: INTERNAL MEDICINE

## 2021-02-12 PROCEDURE — 63600105 HC IODINE BASED CONTRAST: Performed by: INTERNAL MEDICINE

## 2021-02-12 PROCEDURE — C1769 GUIDE WIRE: HCPCS | Performed by: INTERNAL MEDICINE

## 2021-02-12 PROCEDURE — 99153 MOD SED SAME PHYS/QHP EA: CPT | Performed by: INTERNAL MEDICINE

## 2021-02-12 PROCEDURE — 4A0335C MEASUREMENT OF ARTERIAL FLOW, CORONARY, PERCUTANEOUS APPROACH: ICD-10-PCS | Performed by: INTERNAL MEDICINE

## 2021-02-12 PROCEDURE — 63700000 HC SELF-ADMINISTRABLE DRUG: Performed by: INTERNAL MEDICINE

## 2021-02-12 PROCEDURE — C1887 CATHETER, GUIDING: HCPCS | Performed by: INTERNAL MEDICINE

## 2021-02-12 PROCEDURE — 4A033BC MEASUREMENT OF ARTERIAL PRESSURE, CORONARY, PERCUTANEOUS APPROACH: ICD-10-PCS | Performed by: INTERNAL MEDICINE

## 2021-02-12 RX ORDER — PRASUGREL 10 MG/1
10 TABLET, FILM COATED ORAL ONCE
Status: COMPLETED | OUTPATIENT
Start: 2021-02-12 | End: 2021-02-12

## 2021-02-12 RX ORDER — HEPARIN SODIUM 1000 [USP'U]/ML
INJECTION, SOLUTION INTRAVENOUS; SUBCUTANEOUS AS NEEDED
Status: DISCONTINUED | OUTPATIENT
Start: 2021-02-12 | End: 2021-02-12 | Stop reason: HOSPADM

## 2021-02-12 RX ORDER — IODIXANOL 320 MG/ML
INJECTION, SOLUTION INTRAVASCULAR AS NEEDED
Status: DISCONTINUED | OUTPATIENT
Start: 2021-02-12 | End: 2021-02-12 | Stop reason: HOSPADM

## 2021-02-12 RX ORDER — LIDOCAINE HYDROCHLORIDE 10 MG/ML
INJECTION, SOLUTION INFILTRATION; PERINEURAL AS NEEDED
Status: DISCONTINUED | OUTPATIENT
Start: 2021-02-12 | End: 2021-02-12 | Stop reason: HOSPADM

## 2021-02-12 RX ORDER — MIDAZOLAM HYDROCHLORIDE 2 MG/2ML
INJECTION, SOLUTION INTRAMUSCULAR; INTRAVENOUS AS NEEDED
Status: DISCONTINUED | OUTPATIENT
Start: 2021-02-12 | End: 2021-02-12 | Stop reason: HOSPADM

## 2021-02-12 RX ORDER — NICARDIPINE HCL-0.9% SOD CHLOR 1 MG/10 ML
SYRINGE (ML) INTRAVENOUS AS NEEDED
Status: DISCONTINUED | OUTPATIENT
Start: 2021-02-12 | End: 2021-02-12 | Stop reason: HOSPADM

## 2021-02-12 RX ORDER — ASPIRIN 325 MG
325 TABLET ORAL ONCE
Status: COMPLETED | OUTPATIENT
Start: 2021-02-12 | End: 2021-02-12

## 2021-02-12 RX ADMIN — ASPIRIN 325 MG: 325 TABLET ORAL at 09:44

## 2021-02-12 RX ADMIN — PRASUGREL HYDROCHLORIDE 10 MG: 10 TABLET, FILM COATED ORAL at 15:45

## 2021-02-12 NOTE — NURSING NOTE
Pt left radial site c/d/i no bleeding or hematoma. Discharge instructions reviewed with patient, IV dc'd. Transported to lobby via wheelchair.

## 2021-02-12 NOTE — NURSING NOTE
Radial band removed, site cleaned, gauze and tegaderm applied. Arm board applied. Education provided to patient on care of wrist at home.

## 2021-02-12 NOTE — NURSING NOTE
2cc of air removed from vasc band, site began to bleed, 2cc of air returned to band, bleeding stopped, site cleaned, will continue to monitor.

## 2021-02-12 NOTE — NURSING NOTE
1cc of air removed from vasc band, radial site bled immediately, air returned to band. Ariana Moncada and Maurice notified of repeated attempts to deflate band, resulting in site bleeding each time. Will continue to monitor

## 2021-02-12 NOTE — DISCHARGE INSTRUCTIONS
Activity Restrictions  - No driving for 24 hours due to sedation received   - On day of discharge, limit your activities  - No heavy lifting ( greater than 5 lbs) for the next 5 days    Post-Procedure Instructions  - Remove dressing next day. Keep site clean and dry. Avoid ointments, lotions, or powders at site for one week. Call if unusual pain, chest pain, fever greater than 101.5, or drainage from site

## 2021-02-12 NOTE — Clinical Note
"A \"Sylvester-Gurwinder\" swan catheter was introduced into the venous sheath and serially advanced through the right heart chambers"

## 2021-02-12 NOTE — Clinical Note
The right groin and left radial was site clipped, marked  and prepped with ChloraPrep. The patient was draped in a sterile fashion after allowing for the recommended dry time.

## 2021-02-12 NOTE — PRE-PROCEDURE NOTE
Cardiac Cath Lab Pre-procedure Note    - Patient was seen and examined at bedside.  - The patient's chart and all data was reviewed.  - The procedure, treatment alternatives, risks and benefits were explained with specific risks discussed.  - Patient was consented for cardiac cath procedure and possible PCI.                          Pre-sedation assessment  ASA 3   Mallampati class: III - soft palate, base of uvula visible.

## 2021-02-12 NOTE — NURSING NOTE
Dr. Richards back to bedside to assess left radial site/band. 1cc of air removed. Discussed with Dr. Richards that patient did not take effient dose this morning. Per Dr. Richards verbal order, 10mg Effient given to patient.

## 2021-02-15 NOTE — H&P
H&P from 1/13/2021 is reviewed.  Patient is seen and examined.  No significant interval change.  Patient is agreeable to proceed with right and left heart catheterization to evaluate her shortness of breath.    Noé Moncada MD    HPI     Mrs. Sams was seen in the office today for cardiac evaluation.  This is a pleasant 76-year-old lady was under care of Dr. Percy Courtney in the past.  Since his correction she transferred her cardiac care to me.  She has known coronary artery disease and I did multivessel coronary artery stenting in April 2019.  As far as cardiac symptoms she denies any chest pain.  She has exertional shortness of breath of unclear etiology.  Last cardiac catheterization in November 2019 showed patent stent with no new lesions.  Echocardiogram at the same time showed preserved LV systolic function with no regional wall motion abnormality.  Dr. Courtney discussed the options of repeat cardiac catheterization if no obvious etiology of her shortness of breath is identified.  She also saw Dr. Nayely Tidwell for pulmonary evaluation and underwent lung function test.  I do not have the test result and she has a follow-up appointment with Dr. Tidwell later this month.  She has seasonal allergy, cough and shortness of breath which improves with Sudafed.  She is not taking Sudafed regularly due to its known adverse cardiac side effect.  Although she denies any issues with Sudafed.            Medical History: Past medical history significant for coronary disease, status post FRANK x2 LAD 4/27/19; DESx3 RCA 4/29/19; FRANK x1 LCX OM2; patent stents and no new lesions 11/19: Hypertension, dyslipidemia, ischemic cardiomyopathy resolved, AAA (abdominal aortic aneurysm), Epistaxis (1/6/2020), GERD (gastroesophageal reflux disease) Glaucoma  Hiatal hernia, History of hip replacement, total, right , History of rheumatic fever as a child ,Osteoarthritis of multiple joints , Osteoporosis, Pacemaker, Raynaud's disease, RSD  (reflex sympathetic dystrophy)      Surgical History:  has a past surgical history that includes Tonsillectomy; Eye surgery; Joint replacement (Right, 09/2016); Revision total hip arthroplasty (Right, 2017); Dilation and curettage of uterus; Foot surgery (Left); Cholecystectomy open;     Social History: reports that she has never smoked. She has never used smokeless tobacco. She reports previous alcohol use. She reports that she does not use drugs.      Family History: No pertinent family history     I am here a         Current Medications        Current Outpatient Medications   Medication Sig   • nitroglycerin (NITROSTAT) 0.4 mg SL tablet Place 1 tablet (0.4 mg total) under the tongue every 5 (five) minutes as needed for chest pain.   • pantoprazole (PROTONIX) 40 mg EC tablet Take 40 mg by mouth daily.   • prasugreL (EFFIENT) 10 mg tablet Take 1 tablet (10 mg total) by mouth once daily.   • rosuvastatin (CRESTOR) 20 mg tablet Take 1 tablet (20 mg total) by mouth once daily.      No current facility-administered medications for this visit.            Allergies:  Ace inhibitors, Atorvastatin, Brilinta [ticagrelor], Codeine, Dilaudid [hydromorphone], Morphine sulfate, Onion, and Oxycodone               Review of Systems  Constitution: Negative for chills, fever, weight gain and weight loss.   HENT: Negative for congestion, hearing loss and nosebleeds.    Eyes: Negative for visual disturbance.   Cardiovascular: Negative for chest pain, orthopnea, PND, palpitation, presyncope, syncope, lower extremity swelling, syncope.  Positive for exertional shortness of breath  Respiratory: Negative for cough and positive for shortness of breath.    Hematologic/Lymphatic: Does not bruise/bleed easily.   Skin: Negative for rash.   Musculoskeletal: Negative for muscle weakness and myalgias.   Gastrointestinal: Negative for abdominal pain, nausea, vomiting, anorexia, hematemesis, hematochezia, melena.   Genitourinary: Negative for  "dysuria, frequency and nocturia.   Neurological: Negative for dizziness, focal weakness, headaches, light-headedness, numbness and paresthesias.                Objective      Vitals       Vitals:     01/13/21 1255   BP: 126/62   Pulse: 67   Resp: 16   SpO2: 97%   Weight: 64.9 kg (143 lb)   Height: 1.702 m (5' 7\")        Physical Exam  Constitutional: No acute distress.   HENT: Normocephalic and atraumatic. Moist mucous membranes.  Eyes: EOM are normal.   Neck: No JVD present.  Cardiovascular: Normal rate and regular rhythm.  Pulmonary/Chest: Normal effort and air entry. No wheezing, rales, ronchi.  Abdominal: Normal bowel sounds. Soft, non tender.   Musculoskeletal: No bony deformity.  Extremities: No edema.   Neurological: Alert and oriented to person, place, and time.   Skin: Skin is warm and dry. No rash.  Psychiatric: Normal mood, affect and behavior.             EKG     Labs         Lab Results   Component Value Date     WBC 6.60 01/17/2020     HGB 13.3 01/17/2020     HCT 42.4 01/17/2020      01/17/2020     CHOL 162 07/02/2020     TRIG 95 07/02/2020     HDL 74 07/02/2020     LDLCALC 70 07/02/2020     NONHDLCALC 93 11/12/2019     ALT 13 07/02/2020     AST 23 07/02/2020      07/02/2020     K 4.3 07/02/2020      07/02/2020     CREATININE 0.85 07/02/2020     BUN 12 07/02/2020     CO2 26 07/02/2020     TSH 1.86 01/17/2020     INR 1.0 11/18/2019      EKG  Sinus rhythm  Within normal limits          Cardiac Imaging   TRANSTHORACIC ECHO (TTE) LIMITED 11/29/2019     Narrative Technically limited study no gross chamber enlargement  Imaging done just to exclude pericardial effusion Limited echo study  No regional wall motion abnormality preserved ejection fraction 55 to 60%  Aortic sclerosis  Mitral tricuspid valves appear normal  Prominent anterior fat pad no evidence of pericardial effusion or   tamponade                   Assessment/Plan         Non-STEMI 4/26/19; three-vessel CAD; status post FRANK x2 " LAD 4/27/19; DESx3 RCA 4/29/19; FRANK x1 LCX OM2; patent stents and no new lesions 11/19:  She reports no recurrence of her anginal symptoms.    She is only on prasagrel as antiplatelet therapy.  Aspirin is discontinued by Dr. Courtney due to recurrent epistaxis.  Continue on a statin.  Risk factors modification is discussed at length     2:1 AV block; status post dual-chamber pacemaker 11/18/19:  This has been the likely cause of her worsening exertional dyspnea in the past which seems to have improved with adjustment in her pacing parameters.  Her LVEDP was 1 at the time of her catheterization excluding elevated filling pressures as a contributing cause.     Exertional dyspnea:  Unclear etiology.  I will schedule her for regadenoson nuclear stress test for ischemic assessment.  This is to rule out shortness of breath as anginal equivalent.  Her last cardiac catheterization in November 2019 showed patent stent with no new hemodynamically significant disease.  LVEDP was also low.  I told her that if no obvious explanation is found for her dyspnea perhaps another coronary angiogram should be performed and if that does not reveal restenosis or new lesions perhaps a cardiopulmonary exercise test.  She also has an appointment with Dr. Nayely Tidwell for pulmonary evaluation.     History of ischemic cardiomyopathy; resolved post revascularization (LVEF =50 to 55% 6/19):  Repeat echo in November 2019 showed ejection fraction of 55 to 60%.     Hypercholesterolemia:  She continues to tolerate her lipid lowering medications without significant side effects or hepatotoxicity. Her lipids are at goal. Her liver function studies and CPK are normal.  I will continue her present lipid lowering regimen and reassess her lab studies in 6 months     Amid TEE Moncada MD

## 2021-02-17 ENCOUNTER — HOSPITAL ENCOUNTER (OUTPATIENT)
Dept: CARDIOLOGY | Facility: HOSPITAL | Age: 76
Discharge: HOME | End: 2021-02-17
Attending: INTERNAL MEDICINE
Payer: MEDICARE

## 2021-02-17 VITALS
HEART RATE: 63 BPM | HEIGHT: 67 IN | SYSTOLIC BLOOD PRESSURE: 140 MMHG | BODY MASS INDEX: 21.61 KG/M2 | DIASTOLIC BLOOD PRESSURE: 65 MMHG

## 2021-02-17 DIAGNOSIS — R06.02 SOB (SHORTNESS OF BREATH): ICD-10-CM

## 2021-02-17 LAB
AORTIC SINUS VALSALVA: 3 CM
AORTIC VALVE AREA: 1.33 SQUARE CENTIMETERS PER SQUARE METER
AORTIC VALVE AT: 49.73 MS
AORTIC VALVE MEAN VELOCITY: 0.99 M/S
AORTIC VALVE VELOCITY TIME INTEGRAL: 25.92 CM
ASCENDING AORTA: 3 CM
AV MEAN GRADIENT: 3 MMHG
AV PEAK GRADIENT: 6.92 MMHG
AV PEAK VELOCITY-S: 1.1 M/S
AV VALVE AREA: 2.59 CM2
BSA FOR ECHO PROCEDURE: 1.73 M2
DOP CALC LVOT STROKE VOLUME: 60.41 ML
DOP CALC RVOT VTI: 13.23 CM
E WAVE DECELERATION TIME: 320.75 MS
E/A RATIO: 0.75
ECHO EF ESTIMATED: 45 %
EDV (BP): 66.62 ML
EF (A4C): 48.25 %
EF A2C: 57.6 %
EJECTION FRACTION: 51.65 %
EST RIGHT VENT SYSTOLIC PRESSURE BY TRICUSPID REGURGITATION JET: 25 MMHG
ESTIMATED PASP: 25 MMHG
ESV (BP): 32.21 ML
INTERVENTRICULAR SEPTUM: 1.1 CM
LAAS-AP4: 10.49 CM2
LAD 2D: 2.5 CM
LEFT ATRIUM VOLUME INDEX: 20.23 ML/M2
LEFT ATRIUM VOLUME: 35 ML
LEFT INTERNAL DIMENSION IN SYSTOLE: 2.8 CM
LEFT VENTRICLE DIASTOLIC VOLUME INDEX: 37.98 ML/M2
LEFT VENTRICLE DIASTOLIC VOLUME: 65.7 ML
LEFT VENTRICLE SYSTOLIC VOLUME INDEX: 19.65 ML/M2
LEFT VENTRICLE SYSTOLIC VOLUME: 34 ML
LEFT VENTRICULAR INTERNAL DIMENSION IN DIASTOLE: 4.2 CM
LV DIASTOLIC VOLUME: 68.4 ML
LV ESV (APICAL 2 CHAMBER): 29 ML
LVCI: 1.17 LITERS PER MINUTE PER SQUARE METER
LVCO: 2.01 LITERS PER MINUTE
LVEDVI(A2C): 39.54 ML/M2
LVEDVI(BP): 38.51 ML/M2
LVESVI(A2C): 16.76 ML/M2
LVESVI(BP): 18.62 ML/M2
LVOT 2D: 1.9 CM
LVOT A: 3.16 CM2
LVOT MG: 2.17 MMHG
LVOT MV: 0.7 M/S
LVOT PEAK VELOCITY: 0.96 M/S
LVOT VTI: 21.32 CM
MV PEAK A VEL: 0.6 M/S
MV PEAK E VEL: 0.45 M/S
MV VALVE AREA P 1/2 METHOD: 2.37 CM2
POSTERIOR WALL: 1.1 CM
PV PEAK D VEL: 0.31 M/S
PV PEAK S VEL: 0.45 M/S
RAP: 3 MMHG
RVOT VMAX: 0.55 M/S
RVOT VTI: 13.2 CM
TR MAX PG: 22.2 MMHG
TRICUSPID VALVE PEAK REGURGITATION VELOCITY: 2.4 M/S

## 2021-02-17 PROCEDURE — 93306 TTE W/DOPPLER COMPLETE: CPT | Mod: 26 | Performed by: INTERNAL MEDICINE

## 2021-02-17 PROCEDURE — 93306 TTE W/DOPPLER COMPLETE: CPT

## 2021-02-24 ENCOUNTER — OFFICE VISIT (OUTPATIENT)
Dept: CARDIOLOGY | Facility: CLINIC | Age: 76
End: 2021-02-24
Payer: MEDICARE

## 2021-02-24 ENCOUNTER — TELEPHONE (OUTPATIENT)
Dept: CARDIOLOGY | Facility: CLINIC | Age: 76
End: 2021-02-24

## 2021-02-24 VITALS
WEIGHT: 140.4 LBS | RESPIRATION RATE: 16 BRPM | DIASTOLIC BLOOD PRESSURE: 80 MMHG | OXYGEN SATURATION: 99 % | SYSTOLIC BLOOD PRESSURE: 130 MMHG | HEIGHT: 67 IN | HEART RATE: 71 BPM | BODY MASS INDEX: 22.03 KG/M2

## 2021-02-24 DIAGNOSIS — Z95.0 STATUS POST PLACEMENT OF CARDIAC PACEMAKER: ICD-10-CM

## 2021-02-24 DIAGNOSIS — R06.09 DOE (DYSPNEA ON EXERTION): ICD-10-CM

## 2021-02-24 DIAGNOSIS — I25.5 ISCHEMIC CARDIOMYOPATHY: ICD-10-CM

## 2021-02-24 DIAGNOSIS — I25.10 CORONARY ARTERY DISEASE INVOLVING NATIVE CORONARY ARTERY OF NATIVE HEART WITHOUT ANGINA PECTORIS: Primary | ICD-10-CM

## 2021-02-24 DIAGNOSIS — E78.00 HYPERCHOLESTEROLEMIA: ICD-10-CM

## 2021-02-24 PROCEDURE — 93000 ELECTROCARDIOGRAM COMPLETE: CPT | Performed by: INTERNAL MEDICINE

## 2021-02-24 PROCEDURE — 99215 OFFICE O/P EST HI 40 MIN: CPT | Performed by: INTERNAL MEDICINE

## 2021-02-24 NOTE — TELEPHONE ENCOUNTER
SD- pt stopped by the desk after appt with Dr. Moncada. Pt states per Dr. Moncada she needs an appt ASAP.

## 2021-02-25 NOTE — TELEPHONE ENCOUNTER
Dr Michelle self. Aramis was to receive a call today with an appointment with  Dr Salter. Patient states Dr Moncada sent her down after his appnt yesterday. Please call aramis at [phone] with an appnt status. ty

## 2021-02-25 NOTE — TELEPHONE ENCOUNTER
Yahaira: Let her know I have been off this week and am otherwise booked until July so will take a few days to find a place to squeeze her in.

## 2021-02-25 NOTE — TELEPHONE ENCOUNTER
SD: Looked in Dr. Moncada's note and did not see mention of immediate need for appt but may be missing something. Please advise if we can squeeze her in somewhere. TY

## 2021-02-25 NOTE — TELEPHONE ENCOUNTER
SD- called and s/w pt, she is aware you are booked until July and that we need a few days to find a spot for her on your schedule.

## 2021-02-26 ENCOUNTER — IMMUNIZATION (OUTPATIENT)
Dept: IMMUNIZATION | Facility: CLINIC | Age: 76
End: 2021-02-26
Attending: FAMILY MEDICINE

## 2021-02-26 RX ORDER — PANTOPRAZOLE SODIUM 40 MG/1
40 TABLET, DELAYED RELEASE ORAL
OUTPATIENT
Start: 2021-02-26

## 2021-03-18 ENCOUNTER — OFFICE VISIT (OUTPATIENT)
Dept: CARDIOLOGY | Facility: CLINIC | Age: 76
End: 2021-03-18
Payer: MEDICARE

## 2021-03-18 ENCOUNTER — TELEPHONE (OUTPATIENT)
Dept: CARDIOLOGY | Facility: CLINIC | Age: 76
End: 2021-03-18

## 2021-03-18 VITALS
SYSTOLIC BLOOD PRESSURE: 130 MMHG | OXYGEN SATURATION: 98 % | DIASTOLIC BLOOD PRESSURE: 60 MMHG | HEART RATE: 75 BPM | BODY MASS INDEX: 22.54 KG/M2 | HEIGHT: 67 IN | WEIGHT: 143.6 LBS

## 2021-03-18 DIAGNOSIS — I50.42 CHRONIC COMBINED SYSTOLIC AND DIASTOLIC CHF (CONGESTIVE HEART FAILURE) (CMS/HCC): ICD-10-CM

## 2021-03-18 DIAGNOSIS — I45.9 HEART BLOCK: ICD-10-CM

## 2021-03-18 DIAGNOSIS — I73.9 PVD (PERIPHERAL VASCULAR DISEASE) (CMS/HCC): ICD-10-CM

## 2021-03-18 DIAGNOSIS — R06.09 DYSPNEA ON EXERTION: Primary | ICD-10-CM

## 2021-03-18 DIAGNOSIS — I71.40 ABDOMINAL AORTIC ANEURYSM (AAA) WITHOUT RUPTURE (CMS/HCC): ICD-10-CM

## 2021-03-18 DIAGNOSIS — I25.10 CORONARY ARTERY DISEASE INVOLVING NATIVE CORONARY ARTERY OF NATIVE HEART WITHOUT ANGINA PECTORIS: ICD-10-CM

## 2021-03-18 DIAGNOSIS — E78.5 DYSLIPIDEMIA: ICD-10-CM

## 2021-03-18 PROCEDURE — 93000 ELECTROCARDIOGRAM COMPLETE: CPT | Performed by: INTERNAL MEDICINE

## 2021-03-18 PROCEDURE — 99205 OFFICE O/P NEW HI 60 MIN: CPT | Performed by: INTERNAL MEDICINE

## 2021-03-18 NOTE — TELEPHONE ENCOUNTER
Can someone check with [employer] to see if this pt can use a TENS unit on her back and shoulders. She is pretty much pacer dependent.   
Ralph Black,    I spoke with [facility] and they do not recommend that Ms. Sams use the TENS unit. I called Ms Sams to let her know.    
Thank you Alyssia  
Detail Level: Zone
Patient notes she had blepharoplasty done by Dr. Samaniego and has an area of extra skin around her left lateral canthus. \\n- Discussed treatment with Botox to crow’s feet and eyebrows to try and lift the skin around this area.\\n- Patient to consider and will return to clinic for Botox after vacation if she would like Botox.
spouse

## 2021-03-18 NOTE — PROGRESS NOTES
Cardiology Note          HPI   Jennifer Sams is a 76 y.o. female who presents for a second opinion on her exertional dyspnea.      She is a 77yo with a history of a mild ischemic cardiomyopathy with a very recent reassuring left and right heart cath, heart block with pacemaker placed 2019 and CAD who has had significant dyspnea on exertion. She was evaluated at the [facility], then here by Dr. Tidwell from a pulmonary standpoint but a pulmonary cause was not found and PFTs are normal. Dr. Carolina previously made pacemaker setting adjustments with regards to AV delay which helped somewhat initially but not completely. She previously followed with Dr. Courtney who retired, and now Dr. Moncada who did a left and right heart cath last month and found low-normal filling pressures and no residual severe CAD. She has a hard time pinning down how long she has been affected, but says generally since her heart attack in April 2019 at her halfway party, and significantly worse the last 3 months. Since her heart attack she has not taken psuedophed on advice of her doctors which she feels makes a big difference with her allergies. She also stopped exercising 1 year ago with COVID-19. She had been walking her cat, working her way up to 1 mile but not lately. In general she is dyspnic going up a flight of stairs. Her  estimates she gets short of breath walking 100-200 feet but she is ok with household distances. She is dyspnic with making her bed or vaccuming. This is a marked change from 2 years ago. She asks if she has Reynauld's disease as she and her  notices her toes often turn purple and at times associated with pain, at least in her big toes.         Past Medical History:   Diagnosis Date   • AAA (abdominal aortic aneurysm) (CMS/[facility])    • Arthritis    • Elevated blood pressure reading without diagnosis of hypertension 4/19/2019   • Epistaxis 1/6/2020   • GERD (gastroesophageal reflux disease)  2019   • Glaucoma 2019   • Heart murmur    • Hiatal hernia    • History of hip replacement, total, right 2019    Right hip 2016 and revision     • History of rheumatic fever as a child 2019   • Hypercholesterolemia 2019   • Ischemic cardiomyopathy 2019   • Osteoarthritis of multiple joints 2019   • Osteoporosis    • Pacemaker 2019   • Raynaud's disease 2019   • RSD (reflex sympathetic dystrophy) 2019    Of left foot   • SOB (shortness of breath) 12/10/2019   • Status post insertion of drug eluting coronary artery stent 2019     Past Surgical History:   Procedure Laterality Date   • CHOLECYSTECTOMY OPEN     • CORONARY ANGIOPLASTY WITH STENT PLACEMENT  2019    of LAD   • CORONARY ANGIOPLASTY WITH STENT PLACEMENT  2019    of RCA   • DILATION AND CURETTAGE OF UTERUS     • EYE SURGERY      RIGHT CATARACT   • FOOT SURGERY Left    • JOINT REPLACEMENT Right 2016    total hip   • REVISION TOTAL HIP ARTHROPLASTY Right 2017   • TONSILLECTOMY         SOCIAL HISTORY   reports that she has never smoked. She has never used smokeless tobacco. She reports previous alcohol use. She reports that she does not use drugs.  Family Status   Relation Name Status   • Bio Mother   at age 66   • Bio Father   at age 77        pulmonary fibrosis   • MGM     • MGF     • PGM   at age 90        natural causes   • PGF          ALLERGIES  Ace inhibitors, Atorvastatin, Brilinta [ticagrelor], Codeine, Dilaudid [hydromorphone], Morphine sulfate, Onion, and Oxycodone      Outpatient Encounter Medications as of 3/18/2021:   •  nitroglycerin (NITROSTAT) 0.4 mg SL tablet, Place 1 tablet (0.4 mg total) under the tongue every 5 (five) minutes as needed for chest pain.  •  pantoprazole (PROTONIX) 40 mg EC tablet, Take 40 mg by mouth daily.  •  prasugreL (EFFIENT) 10 mg tablet, Take 1 tablet (10 mg total) by mouth once daily.  •   "rosuvastatin (CRESTOR) 20 mg tablet, Take 1 tablet (20 mg total) by mouth once daily.    ROS   As per HPI. Insomnia. Slight weight loss despite a good appetite. Also acid reflux, arthritis of right hp, myalgias, back pain. Otherwise all systems were reviewed and are negative.    Objective   Visit Vitals  /60   Pulse 75   Ht 1.702 m (5' 7\")   Wt 65.1 kg (143 lb 9.6 oz)   SpO2 98%   BMI 22.49 kg/m²       Wt Readings from Last 3 Encounters:   03/18/21 65.1 kg (143 lb 9.6 oz)   02/24/21 63.7 kg (140 lb 6.4 oz)   02/12/21 62.6 kg (138 lb)       Physical Exam   Constitutional: She is oriented to person, place, and time. She appears well-developed.   HENT:   Head: Normocephalic.   Eyes: No scleral icterus.   Neck: No JVD present.   Cardiovascular: Normal rate, regular rhythm and normal heart sounds.   2+ right pedal pulse, 1+ left pedal pulse  Purple discoloration of toes, brisk capillary refill   Pulmonary/Chest: Breath sounds normal.   Musculoskeletal:         General: No edema.   Neurological: She is alert and oriented to person, place, and time.   Skin: Skin is warm and dry.   Psychiatric: She has a normal mood and affect. Judgment normal.   Vitals reviewed.      Lab Results   Component Value Date    GLUCOSE 95 02/08/2021    CALCIUM 9.4 02/08/2021     02/08/2021    K 3.9 02/08/2021    CO2 26 02/08/2021     02/08/2021    BUN 14 02/08/2021    CREATININE 1.0 02/08/2021     Lab Results   Component Value Date    ALT 15 02/08/2021    AST 24 02/08/2021    ALKPHOS 73 02/08/2021    BILITOT 0.7 02/08/2021     Lab Results   Component Value Date    WBC 6.27 02/08/2021    HGB 13.4 02/08/2021    HCT 42.5 02/08/2021    MCV 87.8 02/08/2021     02/08/2021     Lab Results   Component Value Date    TSH 1.86 01/17/2020     Lab Results   Component Value Date    CHOL 162 02/08/2021    CHOL 162 07/02/2020    CHOL 161 12/30/2019     Lab Results   Component Value Date    HDL 68 02/08/2021    HDL 74 07/02/2020    HDL 68 " 12/30/2019     Lab Results   Component Value Date    LDLCALC 59 02/08/2021    LDLCALC 70 07/02/2020    LDLCALC 71 12/30/2019     Lab Results   Component Value Date    TRIG 177 (H) 02/08/2021    TRIG 95 07/02/2020    TRIG 138 12/30/2019     No results found for: CHOLHDL  No results found for: HGBA1C     EKG    Performed on 03/18/21 and personally reviewed:  Sinus at 75bpm   Normal tracing    Imaging    Echo 11/29/19:  Technically limited study no gross chamber enlargement  Imaging done just to exclude pericardial effusion Limited echo study  No regional wall motion abnormality preserved ejection fraction 55 to 60%  Aortic sclerosis  Mitral tricuspid valves appear normal  Prominent anterior fat pad no evidence of pericardial effusion or tamponade    CXR 1/17/2020:  No active disease.    PFTs 12/24/20:        LHC/RHC 2/12/21:  1. Normal right and left heart filling pressures (RA 3, PA 20/5 with mean of 11 mmHg, and PCW 6 mm Hg).  2. Patent prior stents in the mid and distal LAD.  3. 50% ostial Cx stenosis, not functionally significant by iFR (0.95). Patent proximal Cx stent.  4. Patent mid and distal RCA stents.    Echo 2/17/21:  · Left Ventricle: Normal ventricle size. Normal wall thickness. Mildly decreased systolic function. Estimated EF 45- 50%. No regional wall motion abnormalities. Grade I diastolic dysfunction. Diastolic inflow pattern consistent with impaired relaxation. Normal left atrial pressure.  · Aortic Valve: Tricuspid valve. Sclerotic leaflets.  · Sinuses of Valsalva normal-sized. Ascending aorta normal-sized.  · Mild mitral annular calcification.  · Left Atrium: Normal-sized atrium. Normal doppler flow of pulmonary veins.  · Right Ventricle: Normal ventricle size. Low normal systolic function. Pacer wire present. Normal wall thickness.  · Right Atrium: Normal-sized atrium. Pacer/ICD lead present.  · Tricuspid Valve: Normal structure. Mild regurgitation. Estimated RVSP = 25 mmHg. The regurgitation jet  is central. Normal hepatic vein systolic flow.  · IVC/SVC: Normal-sized IVC. IVC collapses >50% during inspiration. Normal hepatic vein flow.  · Pericardium: Normal structure.           ASSESSMENT AND PLAN    1. Dyspnea on exertion.  Not a clear singular diagnosis.  Probably multifactorial.  She has been much less active the last year with COVID-19 and certainly deconditioning plays a part.  Her PFTs are normal as well as her pulmonary exam.  She had a thorough cardiac work-up.  I reviewed her recent echocardiogram and actually find it to be more normal than the read.  I estimate her ejection fraction to be 55%.  Her left heart catheterization showed no new severe disease.  Her right heart catheterization actually showed low filling pressures and low pulmonary pressures.  With possible Raynaud's it is important to rule out pulmonary hypertension which was done.  She does describe symptoms that seem out of proportion to just deconditioning.  I therefore recommended a cardiopulmonary stress test to help us know if there is a cardiac or pulmonary limitation exercise as well as to assess how impaired she has.  She believes that despite her hip osteoarthritis she will be able to do the test.  I also wonder if this could possibly be a side effect of Effient though not very likely.  I reviewed old records and it appears Dr. Courtney chose to go with Effient monotherapy over aspirin when she was over a year out from stenting.  By records he felt it would offer a better GI bleeding profile.  Also reasonable choice with 8 stents.  Nonetheless she has excessive nosebleeds and to see if this is an issue I recommended she switch over to aspirin for 2 weeks as an experiment.  I have asked her to overlap Effient and aspirin for a few days.  If she is not improved after 2 weeks she will switch back to Effient.  If she is improved off Effient we could then consider switching aspirin over to Plavix.  We discussed that regardless of  the cause of her degree of impairment, deconditioning is certainly playing a part and that her exercise and respiratory tolerance should improve with regular exercise and thus advised her to start a walking program.  She will go back to walking her cat.    2. Chronic systolic and diastolic CHF  Ejection fraction read is 45 to 50% on recent echocardiogram, though I would estimate to be 55%.  She is very resistant to adding new medications as Dr. Alba suggested.  Nonetheless after personally reviewing the echocardiogram I think her LV function is actually normal and therefore am not pushing for addition of medications.  No extra volume on exam and a right heart cath showed low filling pressures, so diuretics are not indicated.    3. CAD.  non-STEMI 4/26/19, three-vessel CAD; status post FRANK x 2 LAD 4/27/19 + FRANK x 4 RCA 4/29/19 + FRANK x 1 LCX OM2 5/2/19  She will continue antiplatelet therapy as above.  She also will continue statin therapy.  Follows with Dr. Moncada.    4. Dyslipidemia  She should continue with rosuvastatin therapy.    5. Discoloration of toes, concern for peripheral vascular disease  She describes what could be Raynaud's of her feet but does not get it in her hands.  She does have reduced pulses in her left foot.  I therefore have ordered ankle-brachial index and ultrasound to look at her arterial circulation.  If concerning findings then Dr. Moncada could consider angiography.    6.  Abdominal aortic aneurysm.  2.9 cm on imaging in 2018.  This is now [facility] at [facility].  She believes there has been no significant progression on recent imaging.    7.  Mobitz 2 AV block status post pacemaker 2019.  Dyspnea previously improved somewhat with AV optimization.  She follows with Dr. Carolina.  She inquires if her tens unit can be used with her pacemaker and I will find out from Dr. Carolina on her behalf.          I spent 66 minutes on this date of service performing the following activities:  obtaining history, performing examination, entering orders, documenting, preparing for visit, obtaining / reviewing records, providing counseling and education, independently reviewing study/studies, communicating results and coordinating care. Extensive review of [facility] from [facility]. Personally reviewed echocardiogram images and cath waveforms as well as labs. Ordered studies above.    Thank you for allowing me to participate in the care of this patient.  Please do not hesitate to contact me with any questions or concerns.    Sincerely,  Jermaine Salter MD  3/18/2021

## 2021-03-18 NOTE — LETTER
March 18, 2021     Rach Dc MD  [address] [facility]  [address]  [facility] [address]    Patient: Jennifer Sams  YOB: 1945  Date of Visit: 3/18/2021      Dear Dr. Dc:    Thank you for referring Jennifer Sams to me for evaluation. Below are my notes for this consultation.    If you have questions, please do not hesitate to call me. I look forward to following your patient along with you.         Sincerely,        Jermaine Salter MD        CC: MD Noé Marc MD Steven A Rothman, MD Domsky, Steven M, MD  3/18/2021  9:32 PM  Sign when Signing Visit     Cardiology Note          HPI   Jennifer Sams is a 76 y.o. female who presents for a second opinion on her exertional dyspnea.      She is a 77yo with a history of a mild ischemic cardiomyopathy with a very recent reassuring left and right heart cath, heart block with pacemaker placed 2019 and CAD who has had significant dyspnea on exertion. She was evaluated at the [facility], then here by Dr. Tidwell from a pulmonary standpoint but a pulmonary cause was not found and PFTs are normal. Dr. Carolina previously made pacemaker setting adjustments with regards to AV delay which helped somewhat initially but not completely. She previously followed with Dr. Courtney who retired, and now Dr. Moncada who did a left and right heart cath last month and found low-normal filling pressures and no residual severe CAD. She has a hard time pinning down how long she has been affected, but says generally since her heart attack in April 2019 at her assisted party, and significantly worse the last 3 months. Since her heart attack she has not taken psuedophed on advice of her doctors which she feels makes a big difference with her allergies. She also stopped exercising 1 year ago with COVID-19. She had been walking her cat, working her way up to 1 mile but not lately. In general she is dyspnic going up a flight of stairs. Her   estimates she gets short of breath walking 100-200 feet but she is ok with household distances. She is dyspnic with making her bed or vaccuming. This is a marked change from 2 years ago. She asks if she has Reynauld's disease as she and her  notices her toes often turn purple and at times associated with pain, at least in her big toes.         Past Medical History:   Diagnosis Date   • AAA (abdominal aortic aneurysm) (CMS/[facility])    • Arthritis    • Elevated blood pressure reading without diagnosis of hypertension 2019   • Epistaxis 2020   • GERD (gastroesophageal reflux disease) 2019   • Glaucoma 2019   • Heart murmur    • Hiatal hernia    • History of hip replacement, total, right 2019    Right hip 2016 and revision     • History of rheumatic fever as a child 2019   • Hypercholesterolemia 2019   • Ischemic cardiomyopathy 2019   • Osteoarthritis of multiple joints 2019   • Osteoporosis    • Pacemaker 2019   • Raynaud's disease 2019   • RSD (reflex sympathetic dystrophy) 2019    Of left foot   • SOB (shortness of breath) 12/10/2019   • Status post insertion of drug eluting coronary artery stent 2019     Past Surgical History:   Procedure Laterality Date   • CHOLECYSTECTOMY OPEN     • CORONARY ANGIOPLASTY WITH STENT PLACEMENT  2019    of LAD   • CORONARY ANGIOPLASTY WITH STENT PLACEMENT  2019    of RCA   • DILATION AND CURETTAGE OF UTERUS     • EYE SURGERY      RIGHT CATARACT   • FOOT SURGERY Left    • JOINT REPLACEMENT Right 2016    total hip   • REVISION TOTAL HIP ARTHROPLASTY Right    • TONSILLECTOMY         SOCIAL HISTORY   reports that she has never smoked. She has never used smokeless tobacco. She reports previous alcohol use. She reports that she does not use drugs.  Family Status   Relation Name Status   • Bio Mother   at age 66   • Bio Father   at age 77        pulmonary fibrosis   • SIL   "   • MGF     • PGM   at age 90        natural causes   • PGF          ALLERGIES  Ace inhibitors, Atorvastatin, Brilinta [ticagrelor], Codeine, Dilaudid [hydromorphone], Morphine sulfate, Onion, and Oxycodone      Outpatient Encounter Medications as of 3/18/2021:   •  nitroglycerin (NITROSTAT) 0.4 mg SL tablet, Place 1 tablet (0.4 mg total) under the tongue every 5 (five) minutes as needed for chest pain.  •  pantoprazole (PROTONIX) 40 mg EC tablet, Take 40 mg by mouth daily.  •  prasugreL (EFFIENT) 10 mg tablet, Take 1 tablet (10 mg total) by mouth once daily.  •  rosuvastatin (CRESTOR) 20 mg tablet, Take 1 tablet (20 mg total) by mouth once daily.    ROS   As per HPI. Insomnia. Slight weight loss despite a good appetite. Also acid reflux, arthritis of right hp, myalgias, back pain. Otherwise all systems were reviewed and are negative.    Objective   Visit Vitals  /60   Pulse 75   Ht 1.702 m (5' 7\")   Wt 65.1 kg (143 lb 9.6 oz)   SpO2 98%   BMI 22.49 kg/m²       Wt Readings from Last 3 Encounters:   21 65.1 kg (143 lb 9.6 oz)   21 63.7 kg (140 lb 6.4 oz)   21 62.6 kg (138 lb)       Physical Exam   Constitutional: She is oriented to person, place, and time. She appears well-developed.   HENT:   Head: Normocephalic.   Eyes: No scleral icterus.   Neck: No JVD present.   Cardiovascular: Normal rate, regular rhythm and normal heart sounds.   2+ right pedal pulse, 1+ left pedal pulse  Purple discoloration of toes, brisk capillary refill   Pulmonary/Chest: Breath sounds normal.   Musculoskeletal:         General: No edema.   Neurological: She is alert and oriented to person, place, and time.   Skin: Skin is warm and dry.   Psychiatric: She has a normal mood and affect. Judgment normal.   Vitals reviewed.      Lab Results   Component Value Date    GLUCOSE 95 2021    CALCIUM 9.4 2021     2021    K 3.9 2021    CO2 26 2021     " 02/08/2021    BUN 14 02/08/2021    CREATININE 1.0 02/08/2021     Lab Results   Component Value Date    ALT 15 02/08/2021    AST 24 02/08/2021    ALKPHOS 73 02/08/2021    BILITOT 0.7 02/08/2021     Lab Results   Component Value Date    WBC 6.27 02/08/2021    HGB 13.4 02/08/2021    HCT 42.5 02/08/2021    MCV 87.8 02/08/2021     02/08/2021     Lab Results   Component Value Date    TSH 1.86 01/17/2020     Lab Results   Component Value Date    CHOL 162 02/08/2021    CHOL 162 07/02/2020    CHOL 161 12/30/2019     Lab Results   Component Value Date    HDL 68 02/08/2021    HDL 74 07/02/2020    HDL 68 12/30/2019     Lab Results   Component Value Date    LDLCALC 59 02/08/2021    LDLCALC 70 07/02/2020    LDLCALC 71 12/30/2019     Lab Results   Component Value Date    TRIG 177 (H) 02/08/2021    TRIG 95 07/02/2020    TRIG 138 12/30/2019     No results found for: CHOLHDL  No results found for: HGBA1C     EKG    Performed on 03/18/21 and personally reviewed:  Sinus at 75bpm   Normal tracing    Imaging    Echo 11/29/19:  Technically limited study no gross chamber enlargement  Imaging done just to exclude pericardial effusion Limited echo study  No regional wall motion abnormality preserved ejection fraction 55 to 60%  Aortic sclerosis  Mitral tricuspid valves appear normal  Prominent anterior fat pad no evidence of pericardial effusion or tamponade    CXR 1/17/2020:  No active disease.    PFTs 12/24/20:        LHC/RHC 2/12/21:  1. Normal right and left heart filling pressures (RA 3, PA 20/5 with mean of 11 mmHg, and PCW 6 mm Hg).  2. Patent prior stents in the mid and distal LAD.  3. 50% ostial Cx stenosis, not functionally significant by iFR (0.95). Patent proximal Cx stent.  4. Patent mid and distal RCA stents.    Echo 2/17/21:  · Left Ventricle: Normal ventricle size. Normal wall thickness. Mildly decreased systolic function. Estimated EF 45- 50%. No regional wall motion abnormalities. Grade I diastolic dysfunction.  Diastolic inflow pattern consistent with impaired relaxation. Normal left atrial pressure.  · Aortic Valve: Tricuspid valve. Sclerotic leaflets.  · Sinuses of Valsalva normal-sized. Ascending aorta normal-sized.  · Mild mitral annular calcification.  · Left Atrium: Normal-sized atrium. Normal doppler flow of pulmonary veins.  · Right Ventricle: Normal ventricle size. Low normal systolic function. Pacer wire present. Normal wall thickness.  · Right Atrium: Normal-sized atrium. Pacer/ICD lead present.  · Tricuspid Valve: Normal structure. Mild regurgitation. Estimated RVSP = 25 mmHg. The regurgitation jet is central. Normal hepatic vein systolic flow.  · IVC/SVC: Normal-sized IVC. IVC collapses >50% during inspiration. Normal hepatic vein flow.  · Pericardium: Normal structure.           ASSESSMENT AND PLAN    1. Dyspnea on exertion.  Not a clear singular diagnosis.  Probably multifactorial.  She has been much less active the last year with COVID-19 and certainly deconditioning plays a part.  Her PFTs are normal as well as her pulmonary exam.  She had a thorough cardiac work-up.  I reviewed her recent echocardiogram and actually find it to be more normal than the read.  I estimate her ejection fraction to be 55%.  Her left heart catheterization showed no new severe disease.  Her right heart catheterization actually showed low filling pressures and low pulmonary pressures.  With possible Raynaud's it is important to rule out pulmonary hypertension which was done.  She does describe symptoms that seem out of proportion to just deconditioning.  I therefore recommended a cardiopulmonary stress test to help us know if there is a cardiac or pulmonary limitation exercise as well as to assess how impaired she has.  She believes that despite her hip osteoarthritis she will be able to do the test.  I also wonder if this could possibly be a side effect of Effient though not very likely.  I reviewed old records and it appears   [facility] chose to go with Effient monotherapy over aspirin when she was over a year out from stenting.  By records he felt it would offer a better GI bleeding profile.  Also reasonable choice with 8 stents.  Nonetheless she has excessive nosebleeds and to see if this is an issue I recommended she switch over to aspirin for 2 weeks as an experiment.  I have asked her to overlap Effient and aspirin for a few days.  If she is not improved after 2 weeks she will switch back to Effient.  If she is improved off Effient we could then consider switching aspirin over to Plavix.  We discussed that regardless of the cause of her degree of impairment, deconditioning is certainly playing a part and that her exercise and respiratory tolerance should improve with regular exercise and thus advised her to start a walking program.  She will go back to walking her cat.    2. Chronic systolic and diastolic CHF  Ejection fraction read is 45 to 50% on recent echocardiogram, though I would estimate to be 55%.  She is very resistant to adding new medications as Dr. Alba suggested.  Nonetheless after personally reviewing the echocardiogram I think her LV function is actually normal and therefore am not pushing for addition of medications.  No extra volume on exam and a right heart cath showed low filling pressures, so diuretics are not indicated.    3. CAD.  non-STEMI 4/26/19, three-vessel CAD; status post FRANK x 2 LAD 4/27/19 + FRANK x 4 RCA 4/29/19 + FRANK x 1 LCX OM2 5/2/19  She will continue antiplatelet therapy as above.  She also will continue statin therapy.  Follows with Dr. Moncada.    4. Dyslipidemia  She should continue with rosuvastatin therapy.    5. Discoloration of toes, concern for peripheral vascular disease  She describes what could be Raynaud's of her feet but does not get it in her hands.  She does have reduced pulses in her left foot.  I therefore have ordered ankle-brachial index and ultrasound to look at her arterial  circulation.  If concerning findings then Dr. Moncada could consider angiography.    6.  Abdominal aortic aneurysm.  2.9 cm on imaging in 2018.  This is now followed at [facility].  She believes there has been no significant progression on recent imaging.    7.  Mobitz 2 AV block status post pacemaker 2019.  Dyspnea previously improved somewhat with AV optimization.  She follows with Dr. Carolina.  She inquires if her tens unit can be used with her pacemaker and I will find out from Dr. Carolina on her behalf.          I spent 66 minutes on this date of service performing the following activities: obtaining history, performing examination, entering orders, documenting, preparing for visit, obtaining / reviewing records, providing counseling and education, independently reviewing study/studies, communicating results and coordinating care. Extensive review of records from [facility]. Personally reviewed echocardiogram images and cath waveforms as well as labs. Ordered studies above.    Thank you for allowing me to participate in the care of this patient.  Please do not hesitate to contact me with any questions or concerns.    Sincerely,  Jermaine Salter MD  3/18/2021

## 2021-03-18 NOTE — PATIENT INSTRUCTIONS
1. Cardiopulmonary stress test to evaluate shortness of breath    2. HERNY/US of legs to look at circulation    3. Try 2 weeks of switching Efient to aspirin for 2 weeks to see if it relieved your shortness of breath. Overlap by taking both for 2 weeks.     4. Start a regular walking program (with the cat!).

## 2021-03-19 ENCOUNTER — TELEPHONE (OUTPATIENT)
Dept: CARDIOLOGY | Facility: CLINIC | Age: 76
End: 2021-03-19

## 2021-03-19 NOTE — TELEPHONE ENCOUNTER
SD- 1st available for cardiopulmonary test is 5/7. Pt states that is too far out and requested a sooner date. IS 5/7 too far out or is it ok to schedule pt in May.

## 2021-03-19 NOTE — TELEPHONE ENCOUNTER
I spoke to the patient. Pt is scheduled on 3/24for [facility].   Explained to the patient that we are waiting for a response from Dr Salter about whether it is ok for her to wait until  May to have Cardiopulmonary stress test done.    Pt will wait for a return call.

## 2021-03-19 NOTE — TELEPHONE ENCOUNTER
Ruth-we don;t have a choice with cardiopulmonary stress testing because of COVID they can only do 1 per week to change out tubing.

## 2021-03-22 ENCOUNTER — TELEPHONE (OUTPATIENT)
Dept: CARDIOLOGY | Facility: HOSPITAL | Age: 76
End: 2021-03-22

## 2021-03-24 ENCOUNTER — HOSPITAL ENCOUNTER (OUTPATIENT)
Dept: CARDIOLOGY | Facility: HOSPITAL | Age: 76
Discharge: HOME | End: 2021-03-24
Attending: INTERNAL MEDICINE
Payer: MEDICARE

## 2021-03-24 DIAGNOSIS — I73.9 PVD (PERIPHERAL VASCULAR DISEASE) (CMS/HCC): ICD-10-CM

## 2021-03-24 LAB
IMMEDIATE ARM BP: 152 MMHG
IMMEDIATE LEFT ABI: 1.25
IMMEDIATE LEFT TIBIAL: 190 MMHG
IMMEDIATE RIGHT ABI: 1.11
IMMEDIATE RIGHT TIBIAL: 168 MMHG
LEFT 1ST TOE INDEX: 0.38
LEFT 1ST TOE: 55 MMHG
LEFT ABI: 1.27
LEFT ARM BP: 144 MMHG
LEFT DORSALIS PEDIS INDEX: 1.27
LEFT DORSALIS PEDIS: 183 MMHG
LEFT LOW THIGH INDEX: 1.4
LEFT LOW THIGH: 202 MMHG
LEFT POST TIBIAL INDEX: 1.25
LEFT POSTERIOR TIBIAL: 180 MMHG
LEFT PROXIMAL CALF INDEX: 1.23
LEFT PROXIMAL CALF: 177 MMHG
LEFT TBI: 0.38
RIGHT 1ST TOE INDEX: 0.32
RIGHT 1ST TOE: 46 MMHG
RIGHT ABI: 1.16
RIGHT ARM BP: 139 MMHG
RIGHT DORSALIS PEDIS INDEX: 1.11
RIGHT DORSALIS PEDIS: 160 MMHG
RIGHT LOW THIGH INDEX: 1.33
RIGHT LOW THIGH: 191 MMHG
RIGHT POST TIBIAL INDEX: 1.16
RIGHT POSTERIOR TIBIAL: 167 MMHG
RIGHT PROXIMAL CALF INDEX: 1.08
RIGHT PROXIMAL CALF: 155 MMHG
RIGHT TBI: 0.32
TREADMILL TIME: 3 MIN

## 2021-03-24 PROCEDURE — 93923 UPR/LXTR ART STDY 3+ LVLS: CPT

## 2021-03-24 PROCEDURE — 93923 UPR/LXTR ART STDY 3+ LVLS: CPT | Mod: 26 | Performed by: INTERNAL MEDICINE

## 2021-03-25 ENCOUNTER — TELEPHONE (OUTPATIENT)
Dept: SCHEDULING | Facility: CLINIC | Age: 76
End: 2021-03-25

## 2021-03-25 NOTE — TELEPHONE ENCOUNTER
Circulation poor in her toes, but otherwise normal  Nothing to do for it other than keep her feet warm    Thanks G

## 2021-03-25 NOTE — TELEPHONE ENCOUNTER
Called and notified the pt. Recommended keeping her feet warm. She was concerned w/ gangrene / amputation in the future. Recommended she f/u w/ her vascular MD (Dr. Sun [phone]). She reports he didn't address her feet, just the [place] of the     SD: Pt would like a recommendation for a [facility] vascular MD. TY

## 2021-03-25 NOTE — TELEPHONE ENCOUNTER
P)t called stating her test results are in her chart but she is having trouble understanding them.    Pt would like a call to discuss at [phone]

## 2021-04-02 ENCOUNTER — TELEPHONE (OUTPATIENT)
Dept: SCHEDULING | Facility: CLINIC | Age: 76
End: 2021-04-02

## 2021-04-02 ENCOUNTER — TELEPHONE (OUTPATIENT)
Dept: CARDIOLOGY | Facility: CLINIC | Age: 76
End: 2021-04-02

## 2021-04-02 RX ORDER — ASPIRIN 81 MG/1
81 TABLET ORAL DAILY
Qty: 90 TABLET | Refills: 1
Start: 2021-04-02 | End: 2023-07-01 | Stop reason: HOSPADM

## 2021-04-02 NOTE — TELEPHONE ENCOUNTER
"SD--she is \"like a new woman\" She no longer has any dyspnea doing two sets of stairs now, walking her cat for two blocks, and dusting her home. She has not tried vacumming yet    She is also able to sleep through the night now after 2 years of being on this.    She is very very grateful and happy for you.    I let her know you would call her back sometime Monday with your decision on sticking with just aspirin or trying plavix instead.      Thanks!  "

## 2021-04-02 NOTE — TELEPHONE ENCOUNTER
Pt calling to speak with Dr. Salter about blood thinners EFFIENT 10mg; pt has been off of this med for 2wks     Pt can be reached at [phone]

## 2021-04-03 ENCOUNTER — IMMUNIZATION (OUTPATIENT)
Dept: IMMUNIZATION | Facility: CLINIC | Age: 76
End: 2021-04-03
Attending: FAMILY MEDICINE

## 2021-04-05 RX ORDER — CLOPIDOGREL BISULFATE 75 MG/1
75 TABLET ORAL DAILY
Qty: 90 TABLET | Refills: 3 | Status: SHIPPED | OUTPATIENT
Start: 2021-04-05 | End: 2022-02-21

## 2021-04-15 ENCOUNTER — OFFICE VISIT (OUTPATIENT)
Dept: VASCULAR SURGERY | Facility: CLINIC | Age: 76
End: 2021-04-15
Payer: MEDICARE

## 2021-04-15 VITALS — DIASTOLIC BLOOD PRESSURE: 78 MMHG | SYSTOLIC BLOOD PRESSURE: 133 MMHG | OXYGEN SATURATION: 94 % | HEART RATE: 69 BPM

## 2021-04-15 DIAGNOSIS — I71.40 ABDOMINAL AORTIC ANEURYSM (AAA) WITHOUT RUPTURE (CMS/HCC): Primary | ICD-10-CM

## 2021-04-15 DIAGNOSIS — I73.00 RAYNAUD'S DISEASE WITHOUT GANGRENE: ICD-10-CM

## 2021-04-15 PROCEDURE — 99204 OFFICE O/P NEW MOD 45 MIN: CPT | Performed by: SURGERY

## 2021-04-15 NOTE — ASSESSMENT & PLAN NOTE
76-year-old female with Raynaud's disease, we discussed trial of verapamil however she is adamant against taking any medications.  Recommend follow-up on an as-needed basis in this regards.

## 2021-04-15 NOTE — LETTER
April 15, 2021     Sabrina Caruso MD  [address]  [address]  [place] [address]    Patient: Jennifer Sams  YOB: 1945  Date of Visit: 4/15/2021      Dear Dr. Caruso:    Thank you for referring Jennifer Sams to me for evaluation. Below are my notes for this consultation.    If you have questions, please do not hesitate to call me. I look forward to following your patient along with you.         Sincerely,        Eliu García MD FACS        CC: MD Teena Wilcox MD Steven A Rothman, MD [place], Sebastien A, PA C  4/15/2021  3:18 PM  Sign when Signing Visit       [facility]  [facility]   [address]  [place], [address]  (P)[phone] (F) [phone]       DETAILS OF VISIT   Visit Date: 4/15/2021  Peripheral Vascular Disease (NP: Vascular consult for reduced blood circulation)      Jennifer Sams presents to the office today for evaluation of her toes.  She is a 76-year-old female with past medical history including coronary artery disease and 8 coronary stents who has purple toes which can become painful at night, however improves with walking.  She does take aspirin and Plavix, denies any calf or thigh pain with ambulation.  No ulcerations or rest pain.  She does not have family history of heart disease or aneurysmal disease.  She is a non-smoker and nondiabetic.  She is quite adamant about not taking any unnecessary medications.  Her symptoms are not worsened by leg elevation or by the winter weather.  She does have a small abdominal aortic aneurysm which has been monitored for a number of years now.    MEDICATIONS     •  aspirin  •  clopidogreL  •  nitroglycerin  •  pantoprazole  •  rosuvastatin    PAST MEDICAL AND SURGICAL HISTORY     Past Medical History:   Diagnosis Date   • AAA (abdominal aortic aneurysm) (CMS/[facility])    • Arthritis    • Elevated blood pressure reading without diagnosis of hypertension 4/19/2019    • Epistaxis 1/6/2020   • GERD (gastroesophageal reflux disease) 4/19/2019   • Glaucoma 4/19/2019   • Heart murmur    • Hiatal hernia    • History of hip replacement, total, right 4/19/2019    Right hip 9/ 2016 and revision 2017    • History of rheumatic fever as a child 4/19/2019   • Hypercholesterolemia 4/19/2019   • Ischemic cardiomyopathy 5/9/2019   • Osteoarthritis of multiple joints 4/19/2019   • Osteoporosis    • Pacemaker 12/9/2019   • Raynaud's disease 4/19/2019   • RSD (reflex sympathetic dystrophy) 4/19/2019    Of left foot   • SOB (shortness of breath) 12/10/2019   • Status post insertion of drug eluting coronary artery stent 5/9/2019     Past Surgical History:   Procedure Laterality Date   • CHOLECYSTECTOMY OPEN     • CORONARY ANGIOPLASTY WITH STENT PLACEMENT  04/29/2019    of LAD   • CORONARY ANGIOPLASTY WITH STENT PLACEMENT  04/30/2019    of RCA   • DILATION AND CURETTAGE OF UTERUS     • EYE SURGERY      RIGHT CATARACT   • FOOT SURGERY Left    • JOINT REPLACEMENT Right 09/2016    total hip   • REVISION TOTAL HIP ARTHROPLASTY Right 2017   • TONSILLECTOMY       Family History   Problem Relation Age of Onset   • Congenital heart disease Biological Mother         noted at autopsy   • Pulmonary fibrosis Biological Father    • Heart attack Paternal Grandfather        ALLERGIES     Ace inhibitors, Atorvastatin, Brilinta [ticagrelor], Codeine, Dilaudid [hydromorphone], Morphine sulfate, Onion, and Oxycodone    SOCIAL/TOBACCO HISTORY     Social History     Socioeconomic History   • Marital status: Single     Spouse name: None   • Number of children: None   • Years of education: None   • Highest education level: None   Occupational History   • None   Tobacco Use   • Smoking status: Never Smoker   • Smokeless tobacco: Never Used   Vaping Use   • Vaping Use: Never used   Substance and Sexual Activity   • Alcohol use: Not Currently   • Drug use: No   • Sexual activity: Defer   Other Topics Concern   • None    Social History Narrative   • None     Social Determinants of Health     Financial Resource Strain:    • Difficulty of Paying Living Expenses:    Food Insecurity:    • Worried About Running Out of Food in the Last Year:    • Ran Out of Food in the Last Year:    Transportation Needs:    • Lack of Transportation (Medical):    • Lack of Transportation (Non-Medical):    Physical Activity:    • Days of Exercise per Week:    • Minutes of Exercise per Session:    Stress:    • Feeling of Stress :    Social Connections:    • Frequency of Communication with Friends and Family:    • Frequency of Social Gatherings with Friends and Family:    • Attends Shinto Services:    • Active Member of Clubs or Organizations:    • Attends Club or Organization Meetings:    • Marital Status:    Intimate Partner Violence:    • Fear of Current or Ex-Partner:    • Emotionally Abused:    • Physically Abused:    • Sexually Abused:      Social History     Tobacco Use   Smoking Status Never Smoker   Smokeless Tobacco Never Used       REVIEW OF SYSTEMS     Constitutional: No fever, no chills, and no recent weight change. No headache.  HEENT: No neck pain, no neck stiffness, and no lump or swelling in the neck. no hoarseness, no dysphagia.   Cardiovascular: No chest pain or discomfort, no palpitations.  Respiratory: No wheezing. No shortness of breath, no cough, no dyspnea.  Gastrointestinal: Appetite without significant change. No dysphagia, no active abdominal pain, and no melena. No bright red blood per rectum.  Genitourinary: No hematuria, No genital lesion.  No obvious bladder changes.   Endocrine: No polydipsia and no excessive sweating.  Hematologic: No easy bleeding and no tendency for easy bruising.   Integumentary: No obvious masses, No pruritus. No skin lesions and no rash  Musculoskeletal: No new muscle tenderness.  Neurological: No new motor disturbances, or sensory disturbances.   Psychological: Appropriate.    PHYSICAL EXAM      Vitals:   Visit Vitals  /78   Pulse 69   SpO2 94%       Physical Exam   Constitutional well-developed well-nourished thin female in no acute distress is alert and oriented x3  Is atraumatic and normocephalic  Neck is supple without any JVD the trachea is midline there is no carotid bruits appreciated  Heart with regular rate and rhythm without any clicks rubs or murmurs  Lungs good auscultation bilaterally with any wheezes rales or rhonchi  Abdomen is soft and nontender with positive bowel so  Extremities her feet are cool to touch bilaterally with purple toes on both sides, but there is brisk capillary refill bilaterally she has easily palpable DP and PT pulses bilaterally easily palpable popliteal and femoral pulses bilaterally there is no ulcerations on her feet bilaterally  IMAGING     PVR was reviewed and reveals normal resting and exercise ankle-brachial index.    ASSESSMENT/PLAN     Problem List Items Addressed This Visit        Circulatory    Raynaud's disease    Current Assessment & Plan     76-year-old female with Raynaud's disease, we discussed trial of verapamil however she is adamant against taking any medications.  Recommend follow-up on an as-needed basis in this regards.         Abdominal aortic aneurysm (AAA) without rupture (CMS/HCC) - Primary    Overview     2.9 cm- 2018         Current Assessment & Plan     Small 2.9 cm infrarenal abdominal arctic aneurysm, recommend routine surveillance on an annual basis.         Relevant Orders    Ultrasound abdominal aorta            RICHMOND Estes  4/15/2021    Thank you very much for allowing us to participate in the care of your patient.  I will keep you informed ofher care.  Please not hesitate to call or email if there are any questions.  I can be reached at [phone](mobile) or [email or web address].  Sincerely.     Davis García       This document was generated utilizing voice recognition technology. An attempt at proofreading has  been made to minimize errors but topographical errors may be present.

## 2021-04-15 NOTE — PROGRESS NOTES
[facility]  [facility]   [address]  [place], [address]  (P)[phone] (F) [phone]       DETAILS OF VISIT   Visit Date: 4/15/2021  Peripheral Vascular Disease (NP: Vascular consult for reduced blood circulation)      Jennifer Sams presents to the office today for evaluation of her toes.  She is a 76-year-old female with past medical history including coronary artery disease and 8 coronary stents who has purple toes which can become painful at night, however improves with walking.  She does take aspirin and Plavix, denies any calf or thigh pain with ambulation.  No ulcerations or rest pain.  She does not have family history of heart disease or aneurysmal disease.  She is a non-smoker and nondiabetic.  She is quite adamant about not taking any unnecessary medications.  Her symptoms are not worsened by leg elevation or by the winter weather.  She does have a small abdominal aortic aneurysm which has been monitored for a number of years now.    MEDICATIONS     •  aspirin  •  clopidogreL  •  nitroglycerin  •  pantoprazole  •  rosuvastatin    PAST MEDICAL AND SURGICAL HISTORY     Past Medical History:   Diagnosis Date   • AAA (abdominal aortic aneurysm) (CMS/[facility])    • Arthritis    • Elevated blood pressure reading without diagnosis of hypertension 4/19/2019   • Epistaxis 1/6/2020   • GERD (gastroesophageal reflux disease) 4/19/2019   • Glaucoma 4/19/2019   • Heart murmur    • Hiatal hernia    • History of hip replacement, total, right 4/19/2019    Right hip 9/ 2016 and revision 2017    • History of rheumatic fever as a child 4/19/2019   • Hypercholesterolemia 4/19/2019   • Ischemic cardiomyopathy 5/9/2019   • Osteoarthritis of multiple joints 4/19/2019   • Osteoporosis    • Pacemaker 12/9/2019   • Raynaud's disease 4/19/2019   • RSD (reflex sympathetic dystrophy) 4/19/2019    Of left foot   • SOB (shortness of breath) 12/10/2019   • Status post insertion  of drug eluting coronary artery stent 5/9/2019     Past Surgical History:   Procedure Laterality Date   • CHOLECYSTECTOMY OPEN     • CORONARY ANGIOPLASTY WITH STENT PLACEMENT  04/29/2019    of LAD   • CORONARY ANGIOPLASTY WITH STENT PLACEMENT  04/30/2019    of RCA   • DILATION AND CURETTAGE OF UTERUS     • EYE SURGERY      RIGHT CATARACT   • FOOT SURGERY Left    • JOINT REPLACEMENT Right 09/2016    total hip   • REVISION TOTAL HIP ARTHROPLASTY Right 2017   • TONSILLECTOMY       Family History   Problem Relation Age of Onset   • Congenital heart disease Biological Mother         noted at autopsy   • Pulmonary fibrosis Biological Father    • Heart attack Paternal Grandfather        ALLERGIES     Ace inhibitors, Atorvastatin, Brilinta [ticagrelor], Codeine, Dilaudid [hydromorphone], Morphine sulfate, Onion, and Oxycodone    SOCIAL/TOBACCO HISTORY     Social History     Socioeconomic History   • Marital status: Single     Spouse name: None   • Number of children: None   • Years of education: None   • Highest education level: None   Occupational History   • None   Tobacco Use   • Smoking status: Never Smoker   • Smokeless tobacco: Never Used   Vaping Use   • Vaping Use: Never used   Substance and Sexual Activity   • Alcohol use: Not Currently   • Drug use: No   • Sexual activity: Defer   Other Topics Concern   • None   Social History Narrative   • None     Social Determinants of Health     Financial Resource Strain:    • Difficulty of Paying Living Expenses:    Food Insecurity:    • Worried About Running Out of Food in the Last Year:    • Ran Out of Food in the Last Year:    Transportation Needs:    • Lack of Transportation (Medical):    • Lack of Transportation (Non-Medical):    Physical Activity:    • Days of Exercise per Week:    • Minutes of Exercise per Session:    Stress:    • Feeling of Stress :    Social Connections:    • Frequency of Communication with Friends and Family:    • Frequency of Social Gatherings with  Friends and Family:    • Attends Orthodoxy Services:    • Active Member of Clubs or Organizations:    • Attends Club or Organization Meetings:    • Marital Status:    Intimate Partner Violence:    • Fear of Current or Ex-Partner:    • Emotionally Abused:    • Physically Abused:    • Sexually Abused:      Social History     Tobacco Use   Smoking Status Never Smoker   Smokeless Tobacco Never Used       REVIEW OF SYSTEMS     Constitutional: No fever, no chills, and no recent weight change. No headache.  HEENT: No neck pain, no neck stiffness, and no lump or swelling in the neck. no hoarseness, no dysphagia.   Cardiovascular: No chest pain or discomfort, no palpitations.  Respiratory: No wheezing. No shortness of breath, no cough, no dyspnea.  Gastrointestinal: Appetite without significant change. No dysphagia, no active abdominal pain, and no melena. No bright red blood per rectum.  Genitourinary: No hematuria, No genital lesion.  No obvious bladder changes.   Endocrine: No polydipsia and no excessive sweating.  Hematologic: No easy bleeding and no tendency for easy bruising.   Integumentary: No obvious masses, No pruritus. No skin lesions and no rash  Musculoskeletal: No new muscle tenderness.  Neurological: No new motor disturbances, or sensory disturbances.   Psychological: Appropriate.    PHYSICAL EXAM     Vitals:   Visit Vitals  /78   Pulse 69   SpO2 94%       Physical Exam   Constitutional well-developed well-nourished thin female in no acute distress is alert and oriented x3  Is atraumatic and normocephalic  Neck is supple without any JVD the trachea is midline there is no carotid bruits appreciated  Heart with regular rate and rhythm without any clicks rubs or murmurs  Lungs good auscultation bilaterally with any wheezes rales or rhonchi  Abdomen is soft and nontender with positive bowel so  Extremities her feet are cool to touch bilaterally with purple toes on both sides, but there is brisk capillary  refill bilaterally she has easily palpable DP and PT pulses bilaterally easily palpable popliteal and femoral pulses bilaterally there is no ulcerations on her feet bilaterally  IMAGING     PVR was reviewed and reveals normal resting and exercise ankle-brachial index.    ASSESSMENT/PLAN     Problem List Items Addressed This Visit        Circulatory    Raynaud's disease    Current Assessment & Plan     76-year-old female with Raynaud's disease, we discussed trial of verapamil however she is adamant against taking any medications.  Recommend follow-up on an as-needed basis in this regards.         Abdominal aortic aneurysm (AAA) without rupture (CMS/HCC) - Primary    Overview     2.9 cm- 2018         Current Assessment & Plan     Small 2.9 cm infrarenal abdominal arctic aneurysm, recommend routine surveillance on an annual basis.         Relevant Orders    Ultrasound abdominal aorta            RICHMOND Estes  4/15/2021    Thank you very much for allowing us to participate in the care of your patient.  I will keep you informed ofher care.  Please not hesitate to call or email if there are any questions.  I can be reached at [phone](mobile) or [email or web address].  Sincerely.     Davis García       This document was generated utilizing voice recognition technology. An attempt at proofreading has been made to minimize errors but topographical errors may be present.

## 2021-04-15 NOTE — ASSESSMENT & PLAN NOTE
Small 2.9 cm infrarenal abdominal arctic aneurysm, recommend routine surveillance on an annual basis.

## 2021-05-18 ENCOUNTER — TELEPHONE (OUTPATIENT)
Dept: CARDIOLOGY | Facility: HOSPITAL | Age: 76
End: 2021-05-18

## 2021-05-18 RX ORDER — AZELASTINE 1 MG/ML
1 SPRAY, METERED NASAL DAILY
COMMUNITY
Start: 2020-11-20 | End: 2021-05-19 | Stop reason: ALTCHOICE

## 2021-05-18 RX ORDER — MONTELUKAST SODIUM 10 MG/1
10 TABLET ORAL DAILY
COMMUNITY
Start: 2020-12-14 | End: 2021-05-19 | Stop reason: ALTCHOICE

## 2021-05-18 NOTE — PROGRESS NOTES
Noé Moncada MD  FAC, [facility] [place], [place]  D.Card ([facility])  Interventional Cardiology       Reason for visit : Follow up  HPI   Jennifer Sams is a 76 y.o. female who presents to the office for cardiovascular follow up.  She was formally a patient of Dr. Tomasz Courtney.  She was last seen by our office in February.  She is accompanied to today's visit by her .  She did have an evaluation by Dr. Rakan Salter who recommended a cardiopulmonary stress test.  He also switched her Effient to Plavix, she notes decreased shortness of breath since this switch.  She denies cardiac complaints of chest pain at rest with exertion, shortness of breath, orthopnea, PND, palpitations or lightheadedness.  Her biggest complaint is back pain.  She states she was treated for a UTI and underwent an ultrasound of her kidneys to rule out kidney stones.  On that study it was noted she has cysts on her kidneys.  Her primary care doctor recommended physical therapy for her back discomfort.  However, patient feels like they have not come to the root of the problem.  We defer her back to her primary care doctor for her back discomfort.           Problem List:  · Coronary artery disease, status post  PCI of the left anterior descending artery, right coronary and left circumflex artery in April 2019.  Most recent cardiac catheterization in February 2021 showed patent stents.  Ostial left circumflex artery intermediate lesion is IFR negative.    · Ischemic cardiomyopathy.  EF 45 - 50% in February 2021  · Hypertension  · Dyslipidemia  · 2:1 AV block; status post dual-chamber pacemaker 11/18/19:  · AAA (abdominal aortic aneurysm)  · Epistaxis  · GERD (gastroesophageal reflux disease)   · Glaucoma   ·    · Hiatal hernia,   · History of hip replacement.Osteoarthritis of multiple joints  · Osteoporosis  · History of rheumatic fever as a child ,  · Raynaud's disease   · RSD (reflex sympathetic dystrophy)            Current  "Outpatient Medications   Medication Sig   • aspirin (ASPIR-81) 81 mg enteric coated tablet Take 1 tablet (81 mg total) by mouth daily.   • clopidogreL (PLAVIX) 75 mg tablet Take 1 tablet (75 mg total) by mouth daily.   • nitroglycerin (NITROSTAT) 0.4 mg SL tablet Place 1 tablet (0.4 mg total) under the tongue every 5 (five) minutes as needed for chest pain.   • pantoprazole (PROTONIX) 40 mg EC tablet Take 40 mg by mouth daily.   • rosuvastatin (CRESTOR) 20 mg tablet Take 1 tablet (20 mg total) by mouth once daily.          Allergies:  Ace inhibitors, Atorvastatin, Brilinta [ticagrelor], Codeine, Dilaudid [hydromorphone], Morphine sulfate, Onion, and Oxycodone       Review of Systems  Constitution: Negative for chills, fever, weight gain and weight loss.  Appetite is good.  Sleeps well.  HENT: Negative for congestion, hearing loss and nosebleeds.    Eyes: Negative for visual disturbance.   Cardiovascular: Negative for chest pain, dyspnea on exertion, orthopnea, PND, palpitation, presyncope, syncope, claudication.    Respiratory: Negative for cough and shortness of breath.    Hematologic/Lymphatic: Does not bruise/bleed easily.   Skin: Negative for rash.   Musculoskeletal: Negative for muscle weakness and myalgias.  Positive back discomfort.  Gastrointestinal: Negative for abdominal pain, nausea, vomiting, anorexia, hematemesis, hematochezia, melena.   Genitourinary: Negative for dysuria, frequency and nocturia.   Neurological: Negative for dizziness, focal weakness, headaches, light-headedness, numbness and paresthesias.        Objective   Vitals:    05/19/21 1253   BP: 130/72   Pulse: 63   Resp: 16   SpO2: 98%   Weight: 64.4 kg (142 lb)   Height: 1.702 m (5' 7\")       Wt Readings from Last 3 Encounters:   05/19/21 64.4 kg (142 lb)   03/18/21 65.1 kg (143 lb 9.6 oz)   02/24/21 63.7 kg (140 lb 6.4 oz)     Physical Exam  Constitutional: No acute distress.  Well-developed and well-nourished.  HENT: Normocephalic and " atraumatic. Moist mucous membranes.  Eyes: EOM are normal.   Neck: No JVD present.  No carotid bruit.  Cardiovascular: Normal rate and regular rhythm.  No murmur, gallop or rub.  Pulmonary/Chest: Normal effort and air entry. No wheezing, rales, ronchi.  Abdominal: Normal bowel sounds. Soft, non tender.   Musculoskeletal: No bony deformity.  Extremities: No edema.   Neurological: Alert and oriented to person, place, and time.   Skin: Skin is warm and dry. No rash.  Psychiatric: Normal mood, affect and behavior.        Labs:   Lab Results   Component Value Date    WBC 6.27 02/08/2021    HGB 13.4 02/08/2021    HCT 42.5 02/08/2021     02/08/2021    CHOL 162 02/08/2021    TRIG 177 (H) 02/08/2021    HDL 68 02/08/2021    LDLCALC 59 02/08/2021    NONHDLCALC 94 02/08/2021    ALT 15 02/08/2021    AST 24 02/08/2021     02/08/2021    K 3.9 02/08/2021     02/08/2021    CREATININE 1.0 02/08/2021    BUN 14 02/08/2021    CO2 26 02/08/2021    TSH 1.86 01/17/2020    INR 1.0 11/18/2019       ECG :  Sinus rhythm at 63 bpm.        Cardiac Imaging    TRANSTHORACIC ECHO (TTE) COMPLETE 02/17/2021    Interpretation Summary  · Left Ventricle: Normal ventricle size. Normal wall thickness. Mildly decreased systolic function. Estimated EF 45- 50%. No regional wall motion abnormalities. Grade I diastolic dysfunction. Diastolic inflow pattern consistent with impaired relaxation. Normal left atrial pressure.  · Aortic Valve: Tricuspid valve. Sclerotic leaflets.  · Sinuses of Valsalva normal-sized. Ascending aorta normal-sized.  · Mild mitral annular calcification.  · Left Atrium: Normal-sized atrium. Normal doppler flow of pulmonary veins.  · Right Ventricle: Normal ventricle size. Low normal systolic function. Pacer wire present. Normal wall thickness.  · Right Atrium: Normal-sized atrium. Pacer/ICD lead present.  · Tricuspid Valve: Normal structure. Mild regurgitation. Estimated RVSP = 25 mmHg. The regurgitation jet is  central. Normal hepatic vein systolic flow.  · IVC/SVC: Normal-sized IVC. IVC collapses >50% during inspiration. Normal hepatic vein flow.  · Pericardium: Normal structure.    PVR Interpretation Summary    1. Bilateral normal resting and postexercise ankle brachial indices (right: rest 1.16 and post-exercise 1.11)(left: rest 1.27 and post-exercise 1.25).  2. Normal segmental pressures and pulse volume recordings throughout.   3. Markedly abnormal toe brachial indices bilaterally-suggestive of small vessel disease       Study Findings and Details    Study Details Non-invasive testing of the lower extremities including Doppler, pulse volume recordings and segmental limb pressures.   Vascular Findings    PVR Resting Resting Study Conclusions:       Right resting HENRY of 1.16.        Left resting HENRY of 1.27.   Exercise Study Exercise Findings:       Exercise performed: treadmill test (see table)       Symptoms during exercise: Pt marched in place for 3 mins with no complaints and no need to stop.                               Post Exercise Conclusions:            The right HENRY taken immediately post exercise of 1.11.        The left HENRY taken immediately post exercise of 1.25.            Assessment/Plan   Coronary artery disease.   Coronary artery disease, status post  PCI of the left anterior descending artery, right coronary and left circumflex artery in April 2019.  Most recent cardiac catheterization in February 2021 showed patent stents.  Ostial left circumflex artery intermediate lesion is IFR negative.  Her recent cardiac catheterization is reassuring.  Shortness of breath is unlikely due to anginal equivalent.  She was seen by Dr. Jermaine Salter for shortness of breath. He switched her Effient to Plavix.  Since that switch she feels like her dyspnea on exertion has lessened.  He also recommended a cardiopulmonary stress test which she has not completed as of yet.  She remains free of anginal symptoms.  Aspirin  was previously discontinued by Dr. Courtney due to recurrent epistaxis.  I advised her to continue on all other cardiac medications.    Ischemic cardiomyopathy  She has history of ischemic cardiomyopathy; resolved post revascularization (LVEF 55-60% in November 2019). Repeat echocardiogram in February 2021 showed EF is reduced to 45 - 50%.  Her recent right heart catheterization showed normal right and left-sided filling pressure.  She has intolerance to ACE inhibitors in the past.  I discussed the options of starting on Entresto. She should also be on beta-blocker.  She is reluctant to start any medications in anticipation of side effects.     Dyspnea on exertion  Likely multifactorial.  Recent right and left heart catheterization is reassuring as outlined above. Her filling pressures by right heart cath and by echocardiogram are low normal to normal.  Therefore, diuretic is not indicated.  Echo showed mildly decreased LV systolic function, likely from ischemic cardiomyopathy.  She would benefit from ACEI/ARB/ARNI. She is reluctant to consider any medication.  A cardiopulmonary stress test is pending.  Follow-up with Gaetano after cardiopulmonary stress test. She was seen by Dr. Carolina in the past and pacemaker settings were adjusted, with some improvement of her shortness of breath, but was not completely resolved.  Dr. Carolina feels her shortness of breath is not due to arrhythmia or pacemaker related issues.    She was seen by pulmonologist at [facility] in the past, mild asthma was entertained and Singulair was recommended.  She did not take the above due to potential side effect.  She was then seen by Dr. Nayely Tidwell at [facility].  Apparently, pulmonary function tests are normal.    She also has severe back pain, I believe effort intolerance is playing a role in her symptoms.  She is willing to consider a PM&R evaluation.     2:1 AV block; status post dual-chamber pacemaker  11/18/19:  Follows with Dr. Guzman Carolina.      Dyslipidemia  Recent lipid profile is acceptable.  Continue current dose of rosuvastatin.       Follow up in 6 months.  She is aware to contact her office immediately with any change in symptoms.    I thank you for allowing me to participate in the care of your patient.  If I can furnish you with any further details, please do not hesitate to contact me.    I, Blanca Goodman, am scribing for, and in the presence of, Noé Moncada MD.    I, Noé Moncada MD, personally performed the services described in this documentation as scribed by Blanca Goodman in my presence, and it is both accurate and complete.    Noé Moncada MD    I spent 40 minutes on this date of service performing the following activities: obtaining history, performing examination, entering orders, documenting, preparing for visit, obtaining / reviewing records, providing counseling and education, communicating results and coordinating care.

## 2021-05-18 NOTE — TELEPHONE ENCOUNTER
Spoke to patient about CPST on Friday 5/21.  Patient states she feels better after her medication change under Dr. Salter's supervision.  She is questioning if she needs the test, since she feels better.  Patient will discuss this 5/19 at her office visit with cardiology, then decide if she will have 5/21 CPST.

## 2021-05-19 ENCOUNTER — OFFICE VISIT (OUTPATIENT)
Dept: CARDIOLOGY | Facility: CLINIC | Age: 76
End: 2021-05-19
Payer: MEDICARE

## 2021-05-19 VITALS
RESPIRATION RATE: 16 BRPM | BODY MASS INDEX: 22.29 KG/M2 | WEIGHT: 142 LBS | OXYGEN SATURATION: 98 % | SYSTOLIC BLOOD PRESSURE: 130 MMHG | HEART RATE: 63 BPM | HEIGHT: 67 IN | DIASTOLIC BLOOD PRESSURE: 72 MMHG

## 2021-05-19 DIAGNOSIS — Z95.0 PACEMAKER: ICD-10-CM

## 2021-05-19 DIAGNOSIS — I25.119 CORONARY ARTERY DISEASE INVOLVING NATIVE CORONARY ARTERY OF NATIVE HEART WITH ANGINA PECTORIS (CMS/HCC): ICD-10-CM

## 2021-05-19 DIAGNOSIS — R06.02 SOB (SHORTNESS OF BREATH): ICD-10-CM

## 2021-05-19 DIAGNOSIS — I25.5 ISCHEMIC CARDIOMYOPATHY: ICD-10-CM

## 2021-05-19 DIAGNOSIS — I25.10 CORONARY ARTERY DISEASE INVOLVING NATIVE CORONARY ARTERY OF NATIVE HEART WITHOUT ANGINA PECTORIS: Primary | ICD-10-CM

## 2021-05-19 DIAGNOSIS — E78.00 HYPERCHOLESTEROLEMIA: ICD-10-CM

## 2021-05-19 PROCEDURE — 93000 ELECTROCARDIOGRAM COMPLETE: CPT | Performed by: INTERNAL MEDICINE

## 2021-05-19 PROCEDURE — 99215 OFFICE O/P EST HI 40 MIN: CPT | Performed by: INTERNAL MEDICINE

## 2021-05-19 NOTE — LETTER
Sabrina Caruso MD  [address]  [address]  [place] [address]    Patient: Jennifer Sams  YOB: 1945  Date of Visit: 5/19/2021      Dear Dr. Caruso:    Thank you for referring Jennifer Sams to me for evaluation. Below are my notes for this consultation.    If you have questions, please do not hesitate to call me. I look forward to following your patient along with you.         Sincerely,        Noé Moncada MD        CC: No Recipients  Blanca Goodman CRNP  5/19/2021  2:37 PM  Signed       Noé Moncada MD  FACC, FSCAI, MRCP([place])  D.Card ([facility])  Interventional Cardiology       Reason for visit : Follow up  HPI   Jennifer Sams is a 76 y.o. female who presents to the office for cardiovascular follow up.  She was formally a patient of Dr. Tomasz Courtney.  She was last seen by our office in February.  She is accompanied to today's visit by her .  She did have an evaluation by Dr. Rakan Salter who recommended a cardiopulmonary stress test.  He also switched her Effient to Plavix, she notes decreased shortness of breath since this switch.  She denies cardiac complaints of chest pain at rest with exertion, shortness of breath, orthopnea, PND, palpitations or lightheadedness.  Her biggest complaint is back pain.  She states she was treated for a UTI and underwent an ultrasound of her kidneys to rule out kidney stones.  On that study it was noted she has cysts on her kidneys.  Her primary care doctor recommended physical therapy for her back discomfort.  However, patient feels like they have not come to the root of the problem.  We defer her back to her primary care doctor for her back discomfort.           Problem List:  · Coronary artery disease, status post  PCI of the left anterior descending artery, right coronary and left circumflex artery in April 2019.  Most recent cardiac catheterization in February 2021 showed patent stents.  Ostial left circumflex artery intermediate  lesion is IFR negative.    · Ischemic cardiomyopathy.  EF 45 - 50% in February 2021  · Hypertension  · Dyslipidemia  · 2:1 AV block; status post dual-chamber pacemaker 11/18/19:  · AAA (abdominal aortic aneurysm)  · Epistaxis  · GERD (gastroesophageal reflux disease)   · Glaucoma   ·    · Hiatal hernia,   · History of hip replacement.Osteoarthritis of multiple joints  · Osteoporosis  · History of rheumatic fever as a child ,  · Raynaud's disease   · RSD (reflex sympathetic dystrophy)            Current Outpatient Medications   Medication Sig   • aspirin (ASPIR-81) 81 mg enteric coated tablet Take 1 tablet (81 mg total) by mouth daily.   • clopidogreL (PLAVIX) 75 mg tablet Take 1 tablet (75 mg total) by mouth daily.   • nitroglycerin (NITROSTAT) 0.4 mg SL tablet Place 1 tablet (0.4 mg total) under the tongue every 5 (five) minutes as needed for chest pain.   • pantoprazole (PROTONIX) 40 mg EC tablet Take 40 mg by mouth daily.   • rosuvastatin (CRESTOR) 20 mg tablet Take 1 tablet (20 mg total) by mouth once daily.          Allergies:  Ace inhibitors, Atorvastatin, Brilinta [ticagrelor], Codeine, Dilaudid [hydromorphone], Morphine sulfate, Onion, and Oxycodone       Review of Systems  Constitution: Negative for chills, fever, weight gain and weight loss.  Appetite is good.  Sleeps well.  HENT: Negative for congestion, hearing loss and nosebleeds.    Eyes: Negative for visual disturbance.   Cardiovascular: Negative for chest pain, dyspnea on exertion, orthopnea, PND, palpitation, presyncope, syncope, claudication.    Respiratory: Negative for cough and shortness of breath.    Hematologic/Lymphatic: Does not bruise/bleed easily.   Skin: Negative for rash.   Musculoskeletal: Negative for muscle weakness and myalgias.  Positive back discomfort.  Gastrointestinal: Negative for abdominal pain, nausea, vomiting, anorexia, hematemesis, hematochezia, melena.   Genitourinary: Negative for dysuria, frequency and nocturia.  "  Neurological: Negative for dizziness, focal weakness, headaches, light-headedness, numbness and paresthesias.        Objective   Vitals:    05/19/21 1253   BP: 130/72   Pulse: 63   Resp: 16   SpO2: 98%   Weight: 64.4 kg (142 lb)   Height: 1.702 m (5' 7\")       Wt Readings from Last 3 Encounters:   05/19/21 64.4 kg (142 lb)   03/18/21 65.1 kg (143 lb 9.6 oz)   02/24/21 63.7 kg (140 lb 6.4 oz)     Physical Exam  Constitutional: No acute distress.  Well-developed and well-nourished.  HENT: Normocephalic and atraumatic. Moist mucous membranes.  Eyes: EOM are normal.   Neck: No JVD present.  No carotid bruit.  Cardiovascular: Normal rate and regular rhythm.  No murmur, gallop or rub.  Pulmonary/Chest: Normal effort and air entry. No wheezing, rales, ronchi.  Abdominal: Normal bowel sounds. Soft, non tender.   Musculoskeletal: No bony deformity.  Extremities: No edema.   Neurological: Alert and oriented to person, place, and time.   Skin: Skin is warm and dry. No rash.  Psychiatric: Normal mood, affect and behavior.        Labs:   Lab Results   Component Value Date    WBC 6.27 02/08/2021    HGB 13.4 02/08/2021    HCT 42.5 02/08/2021     02/08/2021    CHOL 162 02/08/2021    TRIG 177 (H) 02/08/2021    HDL 68 02/08/2021    LDLCALC 59 02/08/2021    NONHDLCALC 94 02/08/2021    ALT 15 02/08/2021    AST 24 02/08/2021     02/08/2021    K 3.9 02/08/2021     02/08/2021    CREATININE 1.0 02/08/2021    BUN 14 02/08/2021    CO2 26 02/08/2021    TSH 1.86 01/17/2020    INR 1.0 11/18/2019       ECG :  Sinus rhythm at 63 bpm.        Cardiac Imaging    TRANSTHORACIC ECHO (TTE) COMPLETE 02/17/2021    Interpretation Summary  · Left Ventricle: Normal ventricle size. Normal wall thickness. Mildly decreased systolic function. Estimated EF 45- 50%. No regional wall motion abnormalities. Grade I diastolic dysfunction. Diastolic inflow pattern consistent with impaired relaxation. Normal left atrial pressure.  · Aortic Valve: " Tricuspid valve. Sclerotic leaflets.  · Sinuses of Valsalva normal-sized. Ascending aorta normal-sized.  · Mild mitral annular calcification.  · Left Atrium: Normal-sized atrium. Normal doppler flow of pulmonary veins.  · Right Ventricle: Normal ventricle size. Low normal systolic function. Pacer wire present. Normal wall thickness.  · Right Atrium: Normal-sized atrium. Pacer/ICD lead present.  · Tricuspid Valve: Normal structure. Mild regurgitation. Estimated RVSP = 25 mmHg. The regurgitation jet is central. Normal hepatic vein systolic flow.  · IVC/SVC: Normal-sized IVC. IVC collapses >50% during inspiration. Normal hepatic vein flow.  · Pericardium: Normal structure.    PVR Interpretation Summary    1. Bilateral normal resting and postexercise ankle brachial indices (right: rest 1.16 and post-exercise 1.11)(left: rest 1.27 and post-exercise 1.25).  2. Normal segmental pressures and pulse volume recordings throughout.   3. Markedly abnormal toe brachial indices bilaterally-suggestive of small vessel disease       Study Findings and Details    Study Details Non-invasive testing of the lower extremities including Doppler, pulse volume recordings and segmental limb pressures.   Vascular Findings    PVR Resting Resting Study Conclusions:       Right resting HENRY of 1.16.        Left resting HENRY of 1.27.   Exercise Study Exercise Findings:       Exercise performed: treadmill test (see table)       Symptoms during exercise: Pt marched in place for 3 mins with no complaints and no need to stop.                               Post Exercise Conclusions:            The right HENRY taken immediately post exercise of 1.11.        The left HENRY taken immediately post exercise of 1.25.            Assessment/Plan   Coronary artery disease.   Coronary artery disease, status post  PCI of the left anterior descending artery, right coronary and left circumflex artery in April 2019.  Most recent cardiac catheterization in February 2021  showed patent stents.  Ostial left circumflex artery intermediate lesion is IFR negative.  Her recent cardiac catheterization is reassuring.  Shortness of breath is unlikely due to anginal equivalent.  She was seen by Dr. Jermaine Salter for shortness of breath. He switched her Effient to Plavix.  Since that switch she feels like her dyspnea on exertion has lessened.  He also recommended a cardiopulmonary stress test which she has not completed as of yet.  She remains free of anginal symptoms.  Aspirin was previously discontinued by Dr. Courtney due to recurrent epistaxis.  I advised her to continue on all other cardiac medications.    Ischemic cardiomyopathy  She has history of ischemic cardiomyopathy; resolved post revascularization (LVEF 55-60% in November 2019). Repeat echocardiogram in February 2021 showed EF is reduced to 45 - 50%.  Her recent right heart catheterization showed normal right and left-sided filling pressure.  She has intolerance to ACE inhibitors in the past.  I discussed the options of starting on Entresto. She should also be on beta-blocker.  She is reluctant to start any medications in anticipation of side effects.     Dyspnea on exertion  Likely multifactorial.  Recent right and left heart catheterization is reassuring as outlined above. Her filling pressures by right heart cath and by echocardiogram are low normal to normal.  Therefore, diuretic is not indicated.  Echo showed mildly decreased LV systolic function, likely from ischemic cardiomyopathy.  She would benefit from ACEI/ARB/ARNI. She is reluctant to consider any medication.  A cardiopulmonary stress test is pending.  Follow-up with Gaetano after cardiopulmonary stress test. She was seen by Dr. Carolina in the past and pacemaker settings were adjusted, with some improvement of her shortness of breath, but was not completely resolved.  Dr. Carolina feels her shortness of breath is not due to arrhythmia or pacemaker related issues.    She  was seen by pulmonologist at [facility] in the past, mild asthma was entertained and Singulair was recommended.  She did not take the above due to potential side effect.  She was then seen by Dr. Nayely Tidwell at [facility].  Apparently, pulmonary function tests are normal.    She also has severe back pain, I believe effort intolerance is playing a role in her symptoms.  She is willing to consider a PM&R evaluation.     2:1 AV block; status post dual-chamber pacemaker 11/18/19:  Follows with Dr. Guzman Carolina.      Dyslipidemia  Recent lipid profile is acceptable.  Continue current dose of rosuvastatin.       Follow up in 6 months.  She is aware to contact her office immediately with any change in symptoms.    I thank you for allowing me to participate in the care of your patient.  If I can furnish you with any further details, please do not hesitate to contact me.    I, Blanca Goodman, am scribing for, and in the presence of, Noé Moncada MD.    I, Noé Moncada MD, personally performed the services described in this documentation as scribed by Blanca Goodman in my presence, and it is both accurate and complete.    Noé Moncada MD    I spent 40 minutes on this date of service performing the following activities: obtaining history, performing examination, entering orders, documenting, preparing for visit, obtaining / reviewing records, providing counseling and education, communicating results and coordinating care.

## 2021-05-20 ENCOUNTER — TELEPHONE (OUTPATIENT)
Dept: CARDIOLOGY | Facility: HOSPITAL | Age: 76
End: 2021-05-20

## 2021-05-20 NOTE — TELEPHONE ENCOUNTER
Spoke with Dr. Salter and patient. CPST will need to be rescheduled for 5/21 due to patients back pain. Seeing Dr. Tariq on 6/2.

## 2021-05-20 NOTE — TELEPHONE ENCOUNTER
Called and left message to confirm CPST for 5/21 at 1pm. Patient asked to please call back if unable to make appt.

## 2021-05-21 ENCOUNTER — TELEPHONE (OUTPATIENT)
Dept: CARDIOLOGY | Facility: CLINIC | Age: 76
End: 2021-05-21

## 2021-07-12 ENCOUNTER — TRANSCRIBE ORDERS (OUTPATIENT)
Dept: SCHEDULING | Age: 76
End: 2021-07-12

## 2021-07-12 DIAGNOSIS — R10.2 PELVIC AND PERINEAL PAIN: ICD-10-CM

## 2021-07-12 DIAGNOSIS — M25.562 PAIN IN LEFT KNEE: ICD-10-CM

## 2021-07-12 DIAGNOSIS — M54.50 LOW BACK PAIN: Primary | ICD-10-CM

## 2021-07-12 DIAGNOSIS — M25.561 PAIN IN RIGHT KNEE: ICD-10-CM

## 2021-07-15 ENCOUNTER — HOSPITAL ENCOUNTER (OUTPATIENT)
Dept: RADIOLOGY | Facility: HOSPITAL | Age: 76
Discharge: HOME | End: 2021-07-15
Attending: STUDENT IN AN ORGANIZED HEALTH CARE EDUCATION/TRAINING PROGRAM
Payer: MEDICARE

## 2021-07-15 DIAGNOSIS — M54.50 LOW BACK PAIN: ICD-10-CM

## 2021-07-15 DIAGNOSIS — M25.561 PAIN IN RIGHT KNEE: ICD-10-CM

## 2021-07-15 DIAGNOSIS — M25.562 PAIN IN LEFT KNEE: ICD-10-CM

## 2021-07-15 PROCEDURE — 73502 X-RAY EXAM HIP UNI 2-3 VIEWS: CPT | Mod: LT

## 2021-07-15 PROCEDURE — 73564 X-RAY EXAM KNEE 4 OR MORE: CPT | Mod: 50

## 2021-07-21 ENCOUNTER — TELEPHONE (OUTPATIENT)
Dept: CARDIOLOGY | Facility: HOSPITAL | Age: 76
End: 2021-07-21

## 2021-07-21 NOTE — TELEPHONE ENCOUNTER
Spoke with patient with reminder and instructions for CPST scheduled for 7/23.  Questions answered at this time.

## 2021-07-23 ENCOUNTER — TELEPHONE (OUTPATIENT)
Dept: CARDIOLOGY | Facility: CLINIC | Age: 76
End: 2021-07-23

## 2021-07-23 ENCOUNTER — HOSPITAL ENCOUNTER (OUTPATIENT)
Dept: CARDIOLOGY | Facility: HOSPITAL | Age: 76
Discharge: HOME | End: 2021-07-23
Attending: INTERNAL MEDICINE
Payer: MEDICARE

## 2021-07-23 VITALS
SYSTOLIC BLOOD PRESSURE: 138 MMHG | WEIGHT: 139 LBS | DIASTOLIC BLOOD PRESSURE: 80 MMHG | HEIGHT: 67 IN | HEART RATE: 68 BPM | OXYGEN SATURATION: 96 % | BODY MASS INDEX: 21.82 KG/M2

## 2021-07-23 DIAGNOSIS — R06.09 DYSPNEA ON EXERTION: ICD-10-CM

## 2021-07-23 PROCEDURE — G1004 CDSM NDSC: HCPCS

## 2021-07-23 PROCEDURE — 94621 CARDIOPULM EXERCISE TESTING: CPT | Mod: 26 | Performed by: INTERNAL MEDICINE

## 2021-07-23 PROCEDURE — G1004 CDSM NDSC: HCPCS | Performed by: INTERNAL MEDICINE

## 2021-07-23 NOTE — TELEPHONE ENCOUNTER
Ruth-since she just finished the test, no results yet. Please let her know I will call her early next week, probably Monday evening.

## 2021-07-23 NOTE — TELEPHONE ENCOUNTER
SD- pt stopped by the  after test today, she wants to go over the results and experiencing joint pain, Physical Therapy isnt doing much. Pt is requesting a phone and would like to schedule an appt sooner than later.

## 2021-07-27 DIAGNOSIS — M25.50 ARTHRALGIA, UNSPECIFIED JOINT: ICD-10-CM

## 2021-07-27 DIAGNOSIS — M79.10 MYALGIA: Primary | ICD-10-CM

## 2021-07-27 LAB
STRESS BASELINE BP: NORMAL MMHG
STRESS BASELINE HR: 73 BPM
STRESS O2 SAT REST: 95 %
STRESS PERCENT HR: 82 %
STRESS POST ESTIMATED WORKLOAD: 7 METS
STRESS POST EXERCISE DUR MIN: 11 MIN
STRESS POST EXERCISE DUR SEC: 33 SEC
STRESS POST PEAK BP: NORMAL MMHG
STRESS POST PEAK HR: 118 BPM
STRESS TARGET HR: 122 BPM

## 2021-07-27 NOTE — TELEPHONE ENCOUNTER
She also describes progressive short term memory loss over the last 1-2 years that she is very aware of and now friends are gently pointing out to her. Now terrible with names, sometimes forgetting how to get places she did not previously have trouble with. Of note lifestyle changed abruptly 2 years ago with her heart attack where she went from working full time to retired and much less active. With progressive memory loss, I suggest referral to neurology and have given her a name for which she will call his office to schedule.

## 2021-07-27 NOTE — PROGRESS NOTES
Ruth-please mail her the [facility] lab slips I just put in for.    Referral to neurology for progressive memory loss that she is very aware of and friends around her have noticed.

## 2021-07-27 NOTE — TELEPHONE ENCOUNTER
Reviewed with her. We discussed not a pulmonary limitation. We discussed it showed below average for age, which she was disappointed about. Low anaerobic threashold-discussed deconditioning. She stopped her regular walks over a month ago for long-standing heat intolerance. She describes diffuse joint pains, wondered if statin. We discussed statins would be muscle, not joint. Will check CK. Can do statin holiday. Rheum screening labs and rheumatology referral considered.

## 2021-07-27 NOTE — TELEPHONE ENCOUNTER
Pt is following up on call from Dr. Salter to discuss result. Pt also would like to talk about her medications. Pt can be reach at [phone]

## 2021-08-05 LAB
ANA PAT SER IF-IMP: ABNORMAL
ANA PAT SER IF-IMP: ABNORMAL
ANA SER QL IF: POSITIVE
ANA TITR SER IF: ABNORMAL TITER
ANA TITR SER IF: ABNORMAL TITER
CK SERPL-CCNC: 40 U/L (ref 29–143)
RHEUMATOID FACT SERPL-ACNC: <14 IU/ML

## 2021-08-18 ENCOUNTER — TELEPHONE (OUTPATIENT)
Dept: SCHEDULING | Facility: CLINIC | Age: 76
End: 2021-08-18

## 2021-08-18 NOTE — TELEPHONE ENCOUNTER
Test Results     Name of caller: Jennifer Sams     Relationship to patient: self    Name of patient: Jennifer Sams    Name of physician: Jermaine Salter MD    Type of test: Blood work    Best contact number: [phone]

## 2021-08-27 RX ORDER — ROSUVASTATIN CALCIUM 20 MG/1
20 TABLET, COATED ORAL
Qty: 90 TABLET | Refills: 3 | Status: SHIPPED | OUTPATIENT
Start: 2021-08-27 | End: 2022-05-23

## 2021-08-27 NOTE — TELEPHONE ENCOUNTER
Medicine Refill Request    Last Office Visit: Visit date not found  Last Telemedicine Visit: Visit date not found    Next Office Visit: Visit date not found  Next Telemedicine Visit: Visit date not found     rosuvastatin (CRESTOR) 20 mg tablet  Pt will like 90 days supply and refill    Current Outpatient Medications:   •  aspirin (ASPIR-81) 81 mg enteric coated tablet, Take 1 tablet (81 mg total) by mouth daily., Disp: 90 tablet, Rfl: 1  •  clopidogreL (PLAVIX) 75 mg tablet, Take 1 tablet (75 mg total) by mouth daily., Disp: 90 tablet, Rfl: 3  •  nitroglycerin (NITROSTAT) 0.4 mg SL tablet, Place 1 tablet (0.4 mg total) under the tongue every 5 (five) minutes as needed for chest pain., Disp: 25 tablet, Rfl: 4  •  pantoprazole (PROTONIX) 40 mg EC tablet, Take 40 mg by mouth daily., Disp: , Rfl:   •  rosuvastatin (CRESTOR) 20 mg tablet, Take 1 tablet (20 mg total) by mouth once daily., Disp: 90 tablet, Rfl: 3      BP Readings from Last 3 Encounters:   07/23/21 138/80   05/19/21 130/72   04/15/21 133/78       Recent Lab results:  Lab Results   Component Value Date    CHOL 162 02/08/2021   ,   Lab Results   Component Value Date    HDL 68 02/08/2021   ,   Lab Results   Component Value Date    LDLCALC 59 02/08/2021   ,   Lab Results   Component Value Date    TRIG 177 (H) 02/08/2021        Lab Results   Component Value Date    GLUCOSE 95 02/08/2021   , No results found for: HGBA1C      Lab Results   Component Value Date    CREATININE 1.0 02/08/2021       Lab Results   Component Value Date    TSH 1.86 01/17/2020

## 2021-09-14 ENCOUNTER — OFFICE VISIT (OUTPATIENT)
Dept: CARDIOLOGY | Facility: CLINIC | Age: 76
End: 2021-09-14
Payer: MEDICARE

## 2021-09-14 VITALS
HEART RATE: 72 BPM | HEIGHT: 67 IN | DIASTOLIC BLOOD PRESSURE: 72 MMHG | WEIGHT: 140 LBS | RESPIRATION RATE: 16 BRPM | BODY MASS INDEX: 21.97 KG/M2 | SYSTOLIC BLOOD PRESSURE: 112 MMHG

## 2021-09-14 DIAGNOSIS — Z95.0 PACEMAKER: ICD-10-CM

## 2021-09-14 DIAGNOSIS — R06.02 SOB (SHORTNESS OF BREATH): Primary | ICD-10-CM

## 2021-09-14 DIAGNOSIS — I44.1 MOBITZ TYPE 2 SECOND DEGREE ATRIOVENTRICULAR BLOCK: ICD-10-CM

## 2021-09-14 PROCEDURE — 99214 OFFICE O/P EST MOD 30 MIN: CPT | Performed by: INTERNAL MEDICINE

## 2021-09-14 PROCEDURE — 93000 ELECTROCARDIOGRAM COMPLETE: CPT | Performed by: INTERNAL MEDICINE

## 2021-09-14 ASSESSMENT — ENCOUNTER SYMPTOMS
BACK PAIN: 1
ENDOCRINE NEGATIVE: 1
WEAKNESS: 0
SHORTNESS OF BREATH: 0
EYES NEGATIVE: 1
DYSPNEA ON EXERTION: 1
MUSCULOSKELETAL NEGATIVE: 1
DYSPNEA ON EXERTION: 0
PSYCHIATRIC NEGATIVE: 1
GASTROINTESTINAL NEGATIVE: 1
COUGH: 0
PALPITATIONS: 0

## 2021-09-14 NOTE — ASSESSMENT & PLAN NOTE
Patient has a history of Mobitz 2 AV block.  This was noted with symptoms of dyspnea on exertion when she developed AV block with increased heart rates.  She is 90% ventricular paced.

## 2021-09-14 NOTE — ASSESSMENT & PLAN NOTE
The patient's Medtronic dual-chamber pacemaker was interrogated.  Device is programmed to a DDD mode at  bpm.    By longevity is estimated 10.9 years.  Atrial pacing threshold 0.5 V at 0.4 ms with a P wave amplitude 2.4 mV and impedance 494 ohms.  RV threshold 1V at 0.4 ms with an R wave amplitude of 11.3  mV and impedance 475 ohms.  There have been no significant tacky arrhythmias.    AP 34%,  90% since last interrogation 9/2020. HR histograms are appropriate, but may have some benefit with rate response activated given MARIEE. Her underlying rhythm is sinus today. <1% AF/AT burden. Mode changed to DDDR

## 2021-09-14 NOTE — PROGRESS NOTES
Electrophysiology  Outpatient Note         HPI   Jennifer Sams is a 76 y.o. female who is seen today status post dual-chamber pacemaker on November 18, 2019 indicated for 2:1 AV block.  She initially still had symptoms of dyspnea, but these were improved in the past with with optimization of her AV delays.  Her symptoms of dyspnea continue, at this point do not appear to be arrhythmia related.    Past Medical History:   Diagnosis Date   • AAA (abdominal aortic aneurysm) (CMS/HCC)    • Arthritis    • Elevated blood pressure reading without diagnosis of hypertension 4/19/2019   • Epistaxis 1/6/2020   • GERD (gastroesophageal reflux disease) 4/19/2019   • Glaucoma 4/19/2019   • Heart murmur    • Hiatal hernia    • History of hip replacement, total, right 4/19/2019    Right hip 9/ 2016 and revision 2017    • History of rheumatic fever as a child 4/19/2019   • Hypercholesterolemia 4/19/2019   • Ischemic cardiomyopathy 5/9/2019   • Osteoarthritis of multiple joints 4/19/2019   • Osteoporosis    • Pacemaker 12/9/2019   • Raynaud's disease 4/19/2019   • RSD (reflex sympathetic dystrophy) 4/19/2019    Of left foot   • SOB (shortness of breath) 12/10/2019   • Status post insertion of drug eluting coronary artery stent 5/9/2019       Past Surgical History:   Procedure Laterality Date   • CHOLECYSTECTOMY OPEN     • CORONARY ANGIOPLASTY WITH STENT PLACEMENT  04/29/2019    of LAD   • CORONARY ANGIOPLASTY WITH STENT PLACEMENT  04/30/2019    of RCA   • DILATION AND CURETTAGE OF UTERUS     • EYE SURGERY      RIGHT CATARACT   • FOOT SURGERY Left    • JOINT REPLACEMENT Right 09/2016    total hip   • REVISION TOTAL HIP ARTHROPLASTY Right 2017   • TONSILLECTOMY         Allergies: Ace inhibitors, Atorvastatin, Brilinta [ticagrelor], Codeine, Dilaudid [hydromorphone], Morphine sulfate, Onion, and Oxycodone    Current Outpatient Medications   Medication Sig Dispense Refill   • aspirin (ASPIR-81) 81 mg enteric coated tablet Take 1  tablet (81 mg total) by mouth daily. 90 tablet 1   • clopidogreL (PLAVIX) 75 mg tablet Take 1 tablet (75 mg total) by mouth daily. 90 tablet 3   • nitroglycerin (NITROSTAT) 0.4 mg SL tablet Place 1 tablet (0.4 mg total) under the tongue every 5 (five) minutes as needed for chest pain. 25 tablet 4   • pantoprazole (PROTONIX) 40 mg EC tablet Take 40 mg by mouth daily.     • rosuvastatin (CRESTOR) 20 mg tablet Take 1 tablet (20 mg total) by mouth once daily. 90 tablet 3     No current facility-administered medications for this visit.       Social History     Tobacco Use   • Smoking status: Never Smoker   • Smokeless tobacco: Never Used   Vaping Use   • Vaping Use: Never used   Substance Use Topics   • Alcohol use: Not Currently   • Drug use: No       Family History   Problem Relation Age of Onset   • Congenital heart disease Biological Mother         noted at autopsy   • Pulmonary fibrosis Biological Father    • Heart attack Paternal Grandfather        Review of Systems   Constitutional: Negative for malaise/fatigue.   HENT: Negative for congestion.    Eyes: Negative.    Cardiovascular: Negative for dyspnea on exertion and palpitations.   Respiratory: Negative for cough and shortness of breath.    Endocrine: Negative.    Hematologic/Lymphatic: Negative for bleeding problem.   Skin: Negative.    Musculoskeletal: Negative.    Gastrointestinal: Negative.    Genitourinary: Negative.    Neurological: Negative for weakness.   Psychiatric/Behavioral: Negative.        Objective     Vitals:    09/14/21 1329   BP: 112/72   Pulse: 72   Resp: 16       Physical Exam  Constitutional:       Appearance: She is well-developed.   HENT:      Head: Normocephalic and atraumatic.   Eyes:      Conjunctiva/sclera: Conjunctivae normal.   Cardiovascular:      Rate and Rhythm: Normal rate and regular rhythm.      Heart sounds: Normal heart sounds. No murmur heard.     Pulmonary:      Effort: Pulmonary effort is normal.      Breath sounds: Normal  breath sounds.   Abdominal:      General: Bowel sounds are normal.      Palpations: Abdomen is soft.   Musculoskeletal:         General: Normal range of motion.      Cervical back: Normal range of motion and neck supple.   Skin:     General: Skin is warm and dry.   Neurological:      Mental Status: She is alert and oriented to person, place, and time.      Deep Tendon Reflexes: Reflexes are normal and symmetric.   Psychiatric:         Behavior: Behavior normal.         Thought Content: Thought content normal.         Judgment: Judgment normal.          Labs   Lab Results   Component Value Date    WBC 6.27 02/08/2021    HGB 13.4 02/08/2021    HCT 42.5 02/08/2021     02/08/2021    CHOL 162 02/08/2021    TRIG 177 (H) 02/08/2021    HDL 68 02/08/2021    ALT 15 02/08/2021    AST 24 02/08/2021     02/08/2021    K 3.9 02/08/2021     02/08/2021    CREATININE 1.0 02/08/2021    BUN 14 02/08/2021    CO2 26 02/08/2021    TSH 1.86 01/17/2020    INR 1.0 11/18/2019     BP Readings from Last 3 Encounters:   09/14/21 112/72   07/23/21 138/80   05/19/21 130/72     PACEMAKER IMPLANTATION- 11/18/2019  Implanted Hardware:   The pacemaker generator is a Medtronic, model W1 DR 01, serial #[id number].   The atrial pacing electrode is a Medtronic, model #5076, the serial #[id number].   The right ventricular pacing electrode is a Medtronic, model #5076, the serial #[id number].     Intraoperative Testing:   Right atrium: intrinsic amplitude 2.2 mV, impedance 1025 ohms, pacing threshold 0.8 V @0.5 msec.   Right ventricle: intrinsic amplitude 9.6 mV, impedance 864 ohms, pacing threshold 0.4 V @0.5 msec.    CARDIAC CATH- 11/15/2019  Patent stents in the proximal and mid RCA, mid and distal LAD and proximal LCx into large OM1.  50% ostial LCx stenosis, iFR negative (1.0)   Normal left cardiac filling pressure, LVEDP 1 mmHg    CV NM EXERCISE STRESS 11/13/2019  Exercise technetium tetrofosmin was terminated at 2 minutes due to  hypotension (BP dropped from 110/80 to 80/60 mmHg) and bradycardia (HR dropped from 97 to 57 bpm). Patient was markedly short of breath during exercise. Perfusion was normal at the workload achieved but the test is nondiagnostic due to poor exercise tolerance and inability to achieve target heart rate.    ECG is non-diagnostic due to not achieving the target heart rate.  Gated imaging reveals normal wall motion with hyperdynamic left ventricular systolic function.  The results were discussed with the consulting cardiology fellow.     ECHOCARDIOGRAM 11/29/2019  Technically limited study no gross chamber enlargement  Imaging done just to exclude pericardial effusion Limited echo study  No regional wall motion abnormality preserved ejection fraction 55 to 60%  Aortic sclerosis  Mitral tricuspid valves appear normal  Prominent anterior fat pad no evidence of pericardial effusion or tamponade    ECG Atrial sense, ventricular pace/fusion    Assessment/Plan   Problem List Items Addressed This Visit        Other    SOB (shortness of breath) - Primary    Relevant Orders    ECG 12 LEAD-OFFICE PERFORMED (Completed)          Sofia Al RN  9/14/2021

## 2021-09-14 NOTE — PROGRESS NOTES
Electrophysiology  Outpatient Note         HPI   Jennifer Sams is a 76 y.o. female who is seen today or routine device check and follow up. She is status post dual-chamber pacemaker on November 18, 2019 indicated for 2:1 AV block.  She initially still had symptoms of dyspnea, but these were improved in the past with with optimization of her AV delays.     She continues to complain of dyspnea with exertion, but only in hot weather. When the weather is cool, she is able to walk 1 mile. She had a CP stress test 7/23/2021 which indicated her dyspnea was likely a function of deconditioning. She is currently being worked up for a rheumatologic disorder after a positive JUVENTINO titer of 1:320.     She will sometimes get lightheaded with bending over but otherwise denies syncope, CP, palpitations, orthopnea.       Past Medical History:   Diagnosis Date   • AAA (abdominal aortic aneurysm) (CMS/[facility])    • Arthritis    • Elevated blood pressure reading without diagnosis of hypertension 4/19/2019   • Epistaxis 1/6/2020   • GERD (gastroesophageal reflux disease) 4/19/2019   • Glaucoma 4/19/2019   • Heart murmur    • Hiatal hernia    • History of hip replacement, total, right 4/19/2019    Right hip 9/ 2016 and revision 2017    • History of rheumatic fever as a child 4/19/2019   • Hypercholesterolemia 4/19/2019   • Ischemic cardiomyopathy 5/9/2019   • Osteoarthritis of multiple joints 4/19/2019   • Osteoporosis    • Pacemaker 12/9/2019   • Raynaud's disease 4/19/2019   • RSD (reflex sympathetic dystrophy) 4/19/2019    Of left foot   • SOB (shortness of breath) 12/10/2019   • Status post insertion of drug eluting coronary artery stent 5/9/2019       Past Surgical History:   Procedure Laterality Date   • CHOLECYSTECTOMY OPEN     • CORONARY ANGIOPLASTY WITH STENT PLACEMENT  04/29/2019    of LAD   • CORONARY ANGIOPLASTY WITH STENT PLACEMENT  04/30/2019    of RCA   • DILATION AND CURETTAGE OF UTERUS     • EYE SURGERY      RIGHT  CATARACT   • FOOT SURGERY Left    • JOINT REPLACEMENT Right 09/2016    total hip   • REVISION TOTAL HIP ARTHROPLASTY Right 2017   • TONSILLECTOMY         Allergies: Ace inhibitors, Atorvastatin, Brilinta [ticagrelor], Codeine, Dilaudid [hydromorphone], Morphine sulfate, Onion, and Oxycodone    Current Outpatient Medications   Medication Sig Dispense Refill   • aspirin (ASPIR-81) 81 mg enteric coated tablet Take 1 tablet (81 mg total) by mouth daily. 90 tablet 1   • clopidogreL (PLAVIX) 75 mg tablet Take 1 tablet (75 mg total) by mouth daily. 90 tablet 3   • nitroglycerin (NITROSTAT) 0.4 mg SL tablet Place 1 tablet (0.4 mg total) under the tongue every 5 (five) minutes as needed for chest pain. 25 tablet 4   • pantoprazole (PROTONIX) 40 mg EC tablet Take 40 mg by mouth daily.     • rosuvastatin (CRESTOR) 20 mg tablet Take 1 tablet (20 mg total) by mouth once daily. 90 tablet 3     No current facility-administered medications for this visit.       Social History     Tobacco Use   • Smoking status: Never Smoker   • Smokeless tobacco: Never Used   Vaping Use   • Vaping Use: Never used   Substance Use Topics   • Alcohol use: Not Currently   • Drug use: No       Family History   Problem Relation Age of Onset   • Congenital heart disease Biological Mother         noted at autopsy   • Pulmonary fibrosis Biological Father    • Heart attack Paternal Grandfather        Review of Systems   Constitutional: Negative for malaise/fatigue.   HENT: Negative for congestion.    Eyes: Negative.    Cardiovascular: Positive for dyspnea on exertion. Negative for palpitations.   Respiratory: Negative for cough.    Endocrine: Positive for heat intolerance.   Hematologic/Lymphatic: Negative for bleeding problem.   Skin: Negative.    Musculoskeletal: Positive for back pain.   Gastrointestinal: Negative.    Genitourinary: Negative.    Neurological: Negative for weakness.   Psychiatric/Behavioral: Negative.        Objective     Vitals:     09/14/21 1329   BP: 112/72   Pulse: 72   Resp: 16       Physical Exam  Constitutional:       Appearance: She is well-developed.   HENT:      Head: Normocephalic and atraumatic.   Eyes:      Conjunctiva/sclera: Conjunctivae normal.   Cardiovascular:      Rate and Rhythm: Normal rate and regular rhythm.      Heart sounds: Normal heart sounds. No murmur heard.     Pulmonary:      Effort: Pulmonary effort is normal.      Breath sounds: Normal breath sounds.   Abdominal:      General: Bowel sounds are normal.      Palpations: Abdomen is soft.   Musculoskeletal:         General: Normal range of motion.      Cervical back: Normal range of motion and neck supple.   Skin:     General: Skin is warm and dry.   Neurological:      Mental Status: She is alert and oriented to person, place, and time.      Deep Tendon Reflexes: Reflexes are normal and symmetric.   Psychiatric:         Behavior: Behavior normal.         Thought Content: Thought content normal.         Judgment: Judgment normal.          Labs   Lab Results   Component Value Date    WBC 6.27 02/08/2021    HGB 13.4 02/08/2021    HCT 42.5 02/08/2021     02/08/2021    CHOL 162 02/08/2021    TRIG 177 (H) 02/08/2021    HDL 68 02/08/2021    ALT 15 02/08/2021    AST 24 02/08/2021     02/08/2021    K 3.9 02/08/2021     02/08/2021    CREATININE 1.0 02/08/2021    BUN 14 02/08/2021    CO2 26 02/08/2021    TSH 1.86 01/17/2020    INR 1.0 11/18/2019     BP Readings from Last 3 Encounters:   09/14/21 112/72   07/23/21 138/80   05/19/21 130/72     PACEMAKER IMPLANTATION- 11/18/2019  Implanted Hardware:   The pacemaker generator is a Medtronic, model W1 DR 01, serial #[id number].   The atrial pacing electrode is a Medtronic, model #5076, the serial #[id number].   The right ventricular pacing electrode is a Medtronic, model #5076, the serial #[id number].     Intraoperative Testing:   Right atrium: intrinsic amplitude 2.2 mV, impedance 1025 ohms, pacing threshold 0.8  V @0.5 msec.   Right ventricle: intrinsic amplitude 9.6 mV, impedance 864 ohms, pacing threshold 0.4 V @0.5 msec.    CARDIAC CATH- 2/12/2021  FINDINGS   1. Normal right and left heart filling pressures (RA 3, PA 20/5 with mean of 11 mmHg, and PCW 6 mm Hg).  2. Patent prior stents in the mid and distal LAD.  3. 50% ostial Cx stenosis, not functionally significant by iFR (0.95). Patent proximal Cx stent.  4. Patent mid and distal RCA stents.      CV NM EXERCISE STRESS 11/13/2019  Exercise technetium tetrofosmin was terminated at 2 minutes due to hypotension (BP dropped from 110/80 to 80/60 mmHg) and bradycardia (HR dropped from 97 to 57 bpm). Patient was markedly short of breath during exercise. Perfusion was normal at the workload achieved but the test is nondiagnostic due to poor exercise tolerance and inability to achieve target heart rate.    ECG is non-diagnostic due to not achieving the target heart rate.  Gated imaging reveals normal wall motion with hyperdynamic left ventricular systolic function.  The results were discussed with the consulting cardiology fellow.    TTE 2/17/2021  · Left Ventricle: Normal ventricle size. Normal wall thickness. Mildly decreased systolic function. Estimated EF 45- 50%. No regional wall motion abnormalities. Grade I diastolic dysfunction. Diastolic inflow pattern consistent with impaired relaxation. Normal left atrial pressure.  · Aortic Valve: Tricuspid valve. Sclerotic leaflets.  · Sinuses of Valsalva normal-sized. Ascending aorta normal-sized.  · Mild mitral annular calcification.  · Left Atrium: Normal-sized atrium. Normal doppler flow of pulmonary veins.  · Right Ventricle: Normal ventricle size. Low normal systolic function. Pacer wire present. Normal wall thickness.  · Right Atrium: Normal-sized atrium. Pacer/ICD lead present.  · Tricuspid Valve: Normal structure. Mild regurgitation. Estimated RVSP = 25 mmHg. The regurgitation jet is central. Normal hepatic vein systolic  flow.  · IVC/SVC: Normal-sized IVC. IVC collapses >50% during inspiration. Normal hepatic vein flow.  · Pericardium: Normal structure.    Cardiopulmonary Stress test 7/23/2021  CONCLUSIONS:  1. By respiratory exchange ratio, the test was near maximal thus peak VO2 achieved may slightly underestimate her true exercise capacity.  2. Peak VO2 achieved was 14.7 cc/kg/min which is only 62% of predicted.  In combination with a low anaerobic threshold there is likely an element of deconditioning.  3. Her breathing reserve and spirometry suggest a cardiac rather than pulmonary limitation to exercise.  4. Suggest a regular exercise regimen to potentially increase functional capacity.     ECG: sinus rhythm        Assessment/Plan   Problem List Items Addressed This Visit        Respiratory    SOB (shortness of breath) - Primary    Relevant Orders    ECG 12 LEAD-OFFICE PERFORMED (Completed)       Circulatory    Mobitz type 2 second degree atrioventricular block     Patient has a history of Mobitz 2 AV block.  This was noted with symptoms of dyspnea on exertion when she developed AV block with increased heart rates.  She is 90% ventricular paced.            Other    Pacemaker     The patient's Medtronic dual-chamber pacemaker was interrogated.  Device is programmed to a DDD mode at  bpm.    By longevity is estimated 10.9 years.  Atrial pacing threshold 0.5 V at 0.4 ms with a P wave amplitude 2.4 mV and impedance 494 ohms.  RV threshold 1V at 0.4 ms with an R wave amplitude of 11.3  mV and impedance 475 ohms.  There have been no significant tacky arrhythmias.    AP 34%,  90% since last interrogation 9/2020. HR histograms are appropriate, but may have some benefit with rate response activated given MARIEE. Her underlying rhythm is sinus today. <1% AF/AT burden. Mode changed to DDDR               Trav Sen MD  9/14/2021

## 2021-09-14 NOTE — ASSESSMENT & PLAN NOTE
Cardiopulmonary stress test 7/23/2021 demonstrated etiology is likely due to deconditioning. Her MARIEE is mostly with hot weather, whereas when it is cool she can walk 1 mile. There could be a rheumatologic component given her positive JUVENTINO. She is currently being worked up by rheumatology.

## 2021-09-24 ENCOUNTER — TELEPHONE (OUTPATIENT)
Dept: CARDIOLOGY | Facility: CLINIC | Age: 76
End: 2021-09-24

## 2021-09-24 NOTE — TELEPHONE ENCOUNTER
Patient seen by Dr Carolina on 9/14. He adjusted her ppm. She has not felt well since.   She has worse SOB with stairs and ambulation and gets fluttering episodes even at rest . She also has a new cough. C/o chest discomfort all day yesterday like a gas bubble in her chest around the left breast. She woke up today and the discomfort was gone. TNo swelling, dizziness.  She is not anticoagulated.   She would not be able to come til Monday.  No vital signs.     [phone]

## 2021-09-24 NOTE — TELEPHONE ENCOUNTER
Spoke to patient. She will come to the office on Monday @ 11:30 am to see Taya Suggs- please add patient to Arnol's schedule @ 11:30 for device check. Thanks

## 2021-09-27 ENCOUNTER — OFFICE VISIT (OUTPATIENT)
Dept: CARDIOLOGY | Facility: CLINIC | Age: 76
End: 2021-09-27
Payer: MEDICARE

## 2021-09-27 VITALS
WEIGHT: 140 LBS | DIASTOLIC BLOOD PRESSURE: 80 MMHG | RESPIRATION RATE: 16 BRPM | SYSTOLIC BLOOD PRESSURE: 124 MMHG | HEIGHT: 67 IN | BODY MASS INDEX: 21.97 KG/M2 | HEART RATE: 76 BPM

## 2021-09-27 DIAGNOSIS — I44.1 MOBITZ TYPE 2 SECOND DEGREE ATRIOVENTRICULAR BLOCK: Primary | ICD-10-CM

## 2021-09-27 DIAGNOSIS — I25.5 ISCHEMIC CARDIOMYOPATHY: ICD-10-CM

## 2021-09-27 DIAGNOSIS — I25.119 CORONARY ARTERY DISEASE INVOLVING NATIVE CORONARY ARTERY OF NATIVE HEART WITH ANGINA PECTORIS (CMS/HCC): ICD-10-CM

## 2021-09-27 DIAGNOSIS — R06.09 DOE (DYSPNEA ON EXERTION): ICD-10-CM

## 2021-09-27 PROCEDURE — 99213 OFFICE O/P EST LOW 20 MIN: CPT | Performed by: NURSE PRACTITIONER

## 2021-09-27 PROCEDURE — 93000 ELECTROCARDIOGRAM COMPLETE: CPT | Performed by: NURSE PRACTITIONER

## 2021-09-27 ASSESSMENT — ENCOUNTER SYMPTOMS
ALLERGIC/IMMUNOLOGIC NEGATIVE: 1
GASTROINTESTINAL NEGATIVE: 1
PSYCHIATRIC NEGATIVE: 1
PALPITATIONS: 0
RESPIRATORY NEGATIVE: 1
SHORTNESS OF BREATH: 0
MUSCULOSKELETAL NEGATIVE: 1
CARDIOVASCULAR NEGATIVE: 1
CONSTITUTIONAL NEGATIVE: 1
EYES NEGATIVE: 1
HEMATOLOGIC/LYMPHATIC NEGATIVE: 1
COUGH: 0
NEUROLOGICAL NEGATIVE: 1
ENDOCRINE NEGATIVE: 1

## 2021-09-27 NOTE — ASSESSMENT & PLAN NOTE
The patient tells me she has MARIEE that is much worse since having her pacemaker adjusted by Dr. Carolina in the office approximately 2 weeks ago.  Her cardiopulmonary stress test on 7/23/2021 demonstrated etiology is likely due to deconditioning.  She states she cannot tolerate the hot weather and humidity.  There could be a rheumatologic component given her positive JUVENTINO and is currently being worked up by rheumatology.  She states that since her pacemaker was adjusted she is worse.

## 2021-09-27 NOTE — ASSESSMENT & PLAN NOTE
The patient has second-degree heart block that is controlled in the setting of a normally functioning pacemaker.

## 2021-09-27 NOTE — ASSESSMENT & PLAN NOTE
The patient has a normally functioning Medtronic dual-chamber pacemaker.  She is approximately 11 years remaining on battery.  Her pacemaker mode is DDDR 60 to 150 bpm.  She has no atrial or ventricular arrhythmias noted today.  Her atrial lead has an impedance of 513 ohms, threshold of 0.5 V at 0.4 ms, P wave of 2.1 mV.  Her RV lead has an impedance of 494 ohms, threshold 0.75 V at 0.4 ms, R wave 12.6 mV.  Today I programmed the patient to DDD in place of DDDR since she was having increased heart palpitations and felt completely uncomfortable since having this turned on at her last office visit approximately 2 weeks ago.  The patient should follow-up with Dr. Carolina as previously scheduled.  She should also follow-up with Dr. Clayton as previously scheduled.

## 2021-09-27 NOTE — ASSESSMENT & PLAN NOTE
Patient with a history of coronary artery disease requiring stents to her LAD, RCA, and circumflex..  The patient should continue with aspirin 81 mg Plavix 75 mg and statin.  Her primary cardiologist is Dr. Noé Moncada.

## 2021-09-27 NOTE — PROGRESS NOTES
Electrophysiology       Reason for visit: Follow-up  Chief Complaint   Patient presents with   • Device Check      HPI   Jennifer Sams is a 76 y.o. female who comes to the office today for follow-up of her Medtronic dual-chamber pacemaker.  The patient was recently seen in the office by Dr. Carolina on 9/14/2021.  She states that since that office appointment she has felt a racing heart rate, worsening shortness of breath, and cough at times.  During that appointment her rate response mode was turned on her pacemaker.  She states she wants her pacemaker reprogrammed back to the way it was before.  She denies lightheadedness, syncope, or chest pain.  She has had a prior heart attack and states the chest pressure and palpitations she has felt are dissimilar from that.  She is accompanied by her  today.      Past Medical History:   Diagnosis Date   • AAA (abdominal aortic aneurysm) (CMS/[facility])    • Arthritis    • Elevated blood pressure reading without diagnosis of hypertension 4/19/2019   • Epistaxis 1/6/2020   • GERD (gastroesophageal reflux disease) 4/19/2019   • Glaucoma 4/19/2019   • Heart murmur    • Hiatal hernia    • History of hip replacement, total, right 4/19/2019    Right hip 9/ 2016 and revision 2017    • History of rheumatic fever as a child 4/19/2019   • Hypercholesterolemia 4/19/2019   • Ischemic cardiomyopathy 5/9/2019   • Osteoarthritis of multiple joints 4/19/2019   • Osteoporosis    • Pacemaker 12/9/2019   • Raynaud's disease 4/19/2019   • RSD (reflex sympathetic dystrophy) 4/19/2019    Of left foot   • SOB (shortness of breath) 12/10/2019   • Status post insertion of drug eluting coronary artery stent 5/9/2019     Past Surgical History:   Procedure Laterality Date   • CHOLECYSTECTOMY OPEN     • CORONARY ANGIOPLASTY WITH STENT PLACEMENT  04/29/2019    of LAD   • CORONARY ANGIOPLASTY WITH STENT PLACEMENT  04/30/2019    of RCA   • DILATION AND CURETTAGE OF UTERUS     • EYE SURGERY       RIGHT CATARACT   • FOOT SURGERY Left    • JOINT REPLACEMENT Right 09/2016    total hip   • REVISION TOTAL HIP ARTHROPLASTY Right 2017   • TONSILLECTOMY       Allergies:  Ace inhibitors, Atorvastatin, Brilinta [ticagrelor], Codeine, Dilaudid [hydromorphone], Morphine sulfate, Onion, and Oxycodone    Current Outpatient Medications on File Prior to Visit   Medication Sig Dispense Refill   • aspirin (ASPIR-81) 81 mg enteric coated tablet Take 1 tablet (81 mg total) by mouth daily. 90 tablet 1   • clopidogreL (PLAVIX) 75 mg tablet Take 1 tablet (75 mg total) by mouth daily. 90 tablet 3   • nitroglycerin (NITROSTAT) 0.4 mg SL tablet Place 1 tablet (0.4 mg total) under the tongue every 5 (five) minutes as needed for chest pain. 25 tablet 4   • pantoprazole (PROTONIX) 40 mg EC tablet Take 40 mg by mouth daily.     • rosuvastatin (CRESTOR) 20 mg tablet Take 1 tablet (20 mg total) by mouth once daily. 90 tablet 3     No current facility-administered medications on file prior to visit.     Social History     Socioeconomic History   • Marital status: Single     Spouse name: None   • Number of children: None   • Years of education: None   • Highest education level: None   Occupational History   • None   Tobacco Use   • Smoking status: Never Smoker   • Smokeless tobacco: Never Used   Vaping Use   • Vaping Use: Never used   Substance and Sexual Activity   • Alcohol use: Not Currently   • Drug use: No   • Sexual activity: Defer   Other Topics Concern   • None   Social History Narrative   • None     Social Determinants of Health     Financial Resource Strain:    • Difficulty of Paying Living Expenses: Not on file   Food Insecurity:    • Worried About Running Out of Food in the Last Year: Not on file   • Ran Out of Food in the Last Year: Not on file   Transportation Needs:    • Lack of Transportation (Medical): Not on file   • Lack of Transportation (Non-Medical): Not on file   Physical Activity:    • Days of Exercise per Week: Not  on file   • Minutes of Exercise per Session: Not on file   Stress:    • Feeling of Stress : Not on file   Social Connections:    • Frequency of Communication with Friends and Family: Not on file   • Frequency of Social Gatherings with Friends and Family: Not on file   • Attends Christian Services: Not on file   • Active Member of Clubs or Organizations: Not on file   • Attends Club or Organization Meetings: Not on file   • Marital Status: Not on file   Intimate Partner Violence:    • Fear of Current or Ex-Partner: Not on file   • Emotionally Abused: Not on file   • Physically Abused: Not on file   • Sexually Abused: Not on file   Housing Stability:    • Unable to Pay for Housing in the Last Year: Not on file   • Number of Places Lived in the Last Year: Not on file   • Unstable Housing in the Last Year: Not on file     Family History   Problem Relation Age of Onset   • Congenital heart disease Biological Mother         noted at autopsy   • Pulmonary fibrosis Biological Father    • Heart attack Paternal Grandfather      Review of Systems   Constitutional: Negative.    HENT: Negative.    Eyes: Negative.    Respiratory: Negative.  Negative for cough and shortness of breath.    Cardiovascular: Negative.  Negative for chest pain, palpitations and leg swelling.   Gastrointestinal: Negative.    Endocrine: Negative.    Genitourinary: Negative.    Musculoskeletal: Negative.    Skin: Negative.    Allergic/Immunologic: Negative.    Neurological: Negative.    Hematological: Negative.    Psychiatric/Behavioral: Negative.      Vitals:    09/27/21 1135   BP: 124/80   Pulse: 76   Resp: 16     Wt Readings from Last 3 Encounters:   09/27/21 63.5 kg (140 lb)   09/14/21 63.5 kg (140 lb)   07/23/21 63 kg (139 lb)     Physical Exam  Vitals and nursing note reviewed.   Constitutional:       Appearance: She is well-developed.   HENT:      Head: Normocephalic and atraumatic.   Eyes:      Conjunctiva/sclera: Conjunctivae normal.      Pupils:  Pupils are equal, round, and reactive to light.   Cardiovascular:      Rate and Rhythm: Normal rate and regular rhythm.      Pulses: Normal pulses and intact distal pulses.      Heart sounds: Normal heart sounds.   Pulmonary:      Effort: Pulmonary effort is normal.      Breath sounds: Normal breath sounds.   Abdominal:      General: Bowel sounds are normal.      Palpations: Abdomen is soft.   Musculoskeletal:         General: Normal range of motion.      Cervical back: Normal range of motion and neck supple.   Skin:     General: Skin is warm and dry.   Neurological:      Mental Status: She is alert and oriented to person, place, and time.   Psychiatric:         Behavior: Behavior normal.         Thought Content: Thought content normal.         Judgment: Judgment normal.        Lab Results   Component Value Date    WBC 6.27 02/08/2021    HGB 13.4 02/08/2021    HCT 42.5 02/08/2021     02/08/2021    CHOL 162 02/08/2021    TRIG 177 (H) 02/08/2021    HDL 68 02/08/2021    LDLCALC 59 02/08/2021    ALT 15 02/08/2021    AST 24 02/08/2021     02/08/2021    K 3.9 02/08/2021    CREATININE 1.0 02/08/2021    BUN 14 02/08/2021    TSH 1.86 01/17/2020    INR 1.0 11/18/2019       Cardiac Imaging    TRANSTHORACIC ECHO (TTE) COMPLETE 02/17/2021    Interpretation Summary  · Left Ventricle: Normal ventricle size. Normal wall thickness. Mildly decreased systolic function. Estimated EF 45- 50%. No regional wall motion abnormalities. Grade I diastolic dysfunction. Diastolic inflow pattern consistent with impaired relaxation. Normal left atrial pressure.  · Aortic Valve: Tricuspid valve. Sclerotic leaflets.  · Sinuses of Valsalva normal-sized. Ascending aorta normal-sized.  · Mild mitral annular calcification.  · Left Atrium: Normal-sized atrium. Normal doppler flow of pulmonary veins.  · Right Ventricle: Normal ventricle size. Low normal systolic function. Pacer wire present. Normal wall thickness.  · Right Atrium:  Normal-sized atrium. Pacer/ICD lead present.  · Tricuspid Valve: Normal structure. Mild regurgitation. Estimated RVSP = 25 mmHg. The regurgitation jet is central. Normal hepatic vein systolic flow.  · IVC/SVC: Normal-sized IVC. IVC collapses >50% during inspiration. Normal hepatic vein flow.  · Pericardium: Normal structure.    Most recent stress test results:    CV CARDIOPULMONARY STRESS TEST 07/27/2021 (Final) 7/27/2021    Interpretation Summary  CARDIOPULMONARY GAS EXCHANGE:  1. The test was near-maximal as defined by a respiratory exchange ratio of 1.10(normal >1.10).  2. The peak VO2 was 14.7 cc/kg/min which is 62% predicted (normal >85%).  3. The anaerobic threshold was 39% predicted (normal >40%).  4. The peak minute ventilation was 43L/min yielding a normal breathing reserve of 52% (normal >30%).  5. The VE/VCO2 slope was 43(normal <34).  6. Resting spirometry showed an FVC of 98%, FEV1 of 99% consistent with normal spirometry.    CONCLUSIONS:  1. By respiratory exchange ratio, the test was near maximal thus peak VO2 achieved may slightly underestimate her true exercise capacity.  2. Peak VO2 achieved was 14.7 cc/kg/min which is only 62% of predicted.  In combination with a low anaerobic threshold there is likely an element of deconditioning.  3. Her breathing reserve and spirometry suggest a cardiac rather than pulmonary limitation to exercise.  4. Suggest a regular exercise regimen to potentially increase functional capacity.      Jermaine Salter MD  7/27/2021    Most recent cardiac cath results:    RIGHT & LEFT HEART CATH WITH CORONARY ANGIOGRAPHY, FFR - INITIAL VESSEL 02/13/2021 (Final) 2/13/2021    Conclusion  FINDINGS  1. Normal right and left heart filling pressures (RA 3, PA 20/5 with mean of 11 mmHg, and PCW 6 mm Hg).  2. Patent prior stents in the mid and distal LAD.  3. 50% ostial Cx stenosis, not functionally significant by iFR (0.95). Patent proximal Cx stent.  4. Patent mid and distal RCA  stents.    RECOMMENDATIONS:  Optimal medical therapy and risk factor modification.      ECG: Normal sinus rhythm with ventricular pacing    Other Cardiac Studies: None     Coronary artery disease involving native coronary artery of native heart with angina pectoris (CMS/HCC)  Patient with a history of coronary artery disease requiring stents to her LAD, RCA, and circumflex..  The patient should continue with aspirin 81 mg Plavix 75 mg and statin.  Her primary cardiologist is Dr. Noé Moncada.      MARIEE (dyspnea on exertion)  The patient tells me she has MARIEE that is much worse since having her pacemaker adjusted by Dr. Carolina in the office approximately 2 weeks ago.  Her cardiopulmonary stress test on 7/23/2021 demonstrated etiology is likely due to deconditioning.  She states she cannot tolerate the hot weather and humidity.  There could be a rheumatologic component given her positive JUVENTINO and is currently being worked up by rheumatology.  She states that since her pacemaker was adjusted she is worse.    Mobitz type 2 second degree atrioventricular block  The patient has second-degree heart block that is controlled in the setting of a normally functioning pacemaker.    Pacemaker  The patient has a normally functioning Medtronic dual-chamber pacemaker.  She is approximately 11 years remaining on battery.  Her pacemaker mode is DDDR 60 to 150 bpm.  She has no atrial or ventricular arrhythmias noted today.  Her atrial lead has an impedance of 513 ohms, threshold of 0.5 V at 0.4 ms, P wave of 2.1 mV.  Her RV lead has an impedance of 494 ohms, threshold 0.75 V at 0.4 ms, R wave 12.6 mV.  Today I programmed the patient to DDD in place of DDDR since she was having increased heart palpitations and felt completely uncomfortable since having this turned on at her last office visit approximately 2 weeks ago.  The patient should follow-up with Dr. Carolina as previously scheduled.  She should also follow-up with Dr. Clayton as  previously scheduled.      No orders of the defined types were placed in this encounter.    There are no discontinued medications.    This letter was generated using speech recognition software. Please excuse any typographical errors.       ARIAN Florez  9/27/2021

## 2021-09-27 NOTE — PATIENT INSTRUCTIONS
No medication changes today.  Rate response taken off pacemaker.  Follow up with Dr. Carolina and Dr. Moncada as scheduled.

## 2021-10-20 DIAGNOSIS — I71.40 ABDOMINAL AORTIC ANEURYSM (AAA) WITHOUT RUPTURE (CMS/HCC): Primary | ICD-10-CM

## 2021-10-21 ENCOUNTER — HOSPITAL ENCOUNTER (OUTPATIENT)
Dept: CARDIOLOGY | Facility: CLINIC | Age: 76
Discharge: HOME | End: 2021-10-21
Attending: SURGERY
Payer: MEDICARE

## 2021-10-21 ENCOUNTER — OFFICE VISIT (OUTPATIENT)
Dept: VASCULAR SURGERY | Facility: CLINIC | Age: 76
End: 2021-10-21
Payer: MEDICARE

## 2021-10-21 VITALS — OXYGEN SATURATION: 96 % | DIASTOLIC BLOOD PRESSURE: 50 MMHG | HEART RATE: 74 BPM | SYSTOLIC BLOOD PRESSURE: 148 MMHG

## 2021-10-21 VITALS
WEIGHT: 140 LBS | DIASTOLIC BLOOD PRESSURE: 80 MMHG | BODY MASS INDEX: 21.97 KG/M2 | SYSTOLIC BLOOD PRESSURE: 124 MMHG | HEIGHT: 67 IN

## 2021-10-21 DIAGNOSIS — I71.40 ABDOMINAL AORTIC ANEURYSM (AAA) WITHOUT RUPTURE (CMS/HCC): ICD-10-CM

## 2021-10-21 DIAGNOSIS — I71.40 ABDOMINAL AORTIC ANEURYSM (AAA) WITHOUT RUPTURE (CMS/HCC): Primary | ICD-10-CM

## 2021-10-21 LAB
ABDOMINAL DIST AORTA AP: 2.03 CM
ABDOMINAL DIST AORTA TRANS: 1.92 CM
ABDOMINAL DIST AORTA VEL: 49.8 CM/S
ABDOMINAL LT COM ILIAC AP: 1.09 CM
ABDOMINAL LT COM ILIAC TRANS: 1.12 CM
ABDOMINAL LT COM ILIAC VEL: 75.2 CM/S
ABDOMINAL MID AORTA AP: 2.73 CM
ABDOMINAL MID AORTA TRANS: 3.06 CM
ABDOMINAL MID AORTA VEL: 59.4 CM/S
ABDOMINAL PROX AORTA AP: 1.75 CM
ABDOMINAL PROX AORTA TRANS: 1.92 CM
ABDOMINAL PROX AORTA VEL: 49.6 CM/S
ABDOMINAL RT COM ILIAC AP: 0.91 CM
ABDOMINAL RT COM ILIAC TRANS: 1 CM
ABDOMINAL RT COM ILIAC VEL: 63.5 CM/S
BSA FOR ECHO PROCEDURE: 1.73 M2
LEFT COMMON ILIAC RATIO: 1.51
MID AORTA SAG AP: 3.14 CM
RIGHT COMMON ILIAC RATIO: 1.28

## 2021-10-21 PROCEDURE — 93978 VASCULAR STUDY: CPT | Performed by: SURGERY

## 2021-10-21 PROCEDURE — 99214 OFFICE O/P EST MOD 30 MIN: CPT | Performed by: SURGERY

## 2021-10-22 NOTE — PROGRESS NOTES
[facility]  [facility]   [address]  [place], [address]  (P)[phone] (F) [phone]       DETAILS OF VISIT   Visit Date: 10/22/2021  No chief complaint on file.      Jennifer Sams presents to the office today for evaluation of her AAA.  She was seen a year ago for evaluation in regards to her peripheral vascular disease and no known 2.9 cm ectatic AAA.  At her last visit I recommended she follow-up in 1 year for some reason she is here early.  She has no complaints and is feeling well.  She does have some back pain which is unchanged from her prior visit, she is seen PMNR.    MEDICATIONS     •  aspirin  •  clopidogreL  •  nitroglycerin  •  pantoprazole  •  rosuvastatin    PAST MEDICAL AND SURGICAL HISTORY     Past Medical History:   Diagnosis Date   • AAA (abdominal aortic aneurysm) (CMS/[facility])    • Arthritis    • Elevated blood pressure reading without diagnosis of hypertension 4/19/2019   • Epistaxis 1/6/2020   • GERD (gastroesophageal reflux disease) 4/19/2019   • Glaucoma 4/19/2019   • Heart murmur    • Hiatal hernia    • History of hip replacement, total, right 4/19/2019    Right hip 9/ 2016 and revision 2017    • History of rheumatic fever as a child 4/19/2019   • Hypercholesterolemia 4/19/2019   • Ischemic cardiomyopathy 5/9/2019   • Osteoarthritis of multiple joints 4/19/2019   • Osteoporosis    • Pacemaker 12/9/2019   • Raynaud's disease 4/19/2019   • RSD (reflex sympathetic dystrophy) 4/19/2019    Of left foot   • SOB (shortness of breath) 12/10/2019   • Status post insertion of drug eluting coronary artery stent 5/9/2019     Past Surgical History:   Procedure Laterality Date   • CHOLECYSTECTOMY OPEN     • CORONARY ANGIOPLASTY WITH STENT PLACEMENT  04/29/2019    of LAD   • CORONARY ANGIOPLASTY WITH STENT PLACEMENT  04/30/2019    of RCA   • DILATION AND CURETTAGE OF UTERUS     • EYE SURGERY      RIGHT CATARACT   • FOOT SURGERY Left    • JOINT  REPLACEMENT Right 09/2016    total hip   • REVISION TOTAL HIP ARTHROPLASTY Right 2017   • TONSILLECTOMY       Family History   Problem Relation Age of Onset   • Congenital heart disease Biological Mother         noted at autopsy   • Pulmonary fibrosis Biological Father    • Heart attack Paternal Grandfather        ALLERGIES     Ace inhibitors, Atorvastatin, Brilinta [ticagrelor], Codeine, Dilaudid [hydromorphone], Morphine sulfate, Onion, and Oxycodone    SOCIAL/TOBACCO HISTORY     Social History     Socioeconomic History   • Marital status: Single     Spouse name: None   • Number of children: None   • Years of education: None   • Highest education level: None   Occupational History   • None   Tobacco Use   • Smoking status: Never Smoker   • Smokeless tobacco: Never Used   Vaping Use   • Vaping Use: Never used   Substance and Sexual Activity   • Alcohol use: Not Currently   • Drug use: No   • Sexual activity: Defer   Other Topics Concern   • None   Social History Narrative   • None     Social Determinants of Health     Financial Resource Strain:    • Difficulty of Paying Living Expenses: Not on file   Food Insecurity:    • Worried About Running Out of Food in the Last Year: Not on file   • Ran Out of Food in the Last Year: Not on file   Transportation Needs:    • Lack of Transportation (Medical): Not on file   • Lack of Transportation (Non-Medical): Not on file   Physical Activity:    • Days of Exercise per Week: Not on file   • Minutes of Exercise per Session: Not on file   Stress:    • Feeling of Stress : Not on file   Social Connections:    • Frequency of Communication with Friends and Family: Not on file   • Frequency of Social Gatherings with Friends and Family: Not on file   • Attends Confucianism Services: Not on file   • Active Member of Clubs or Organizations: Not on file   • Attends Club or Organization Meetings: Not on file   • Marital Status: Not on file   Intimate Partner Violence:    • Fear of Current or  Ex-Partner: Not on file   • Emotionally Abused: Not on file   • Physically Abused: Not on file   • Sexually Abused: Not on file   Housing Stability:    • Unable to Pay for Housing in the Last Year: Not on file   • Number of Places Lived in the Last Year: Not on file   • Unstable Housing in the Last Year: Not on file     Social History     Tobacco Use   Smoking Status Never Smoker   Smokeless Tobacco Never Used       REVIEW OF SYSTEMS     Constitutional: No fever, no chills, and no recent weight change. No headache.  HEENT: No neck pain, no neck stiffness, and no lump or swelling in the neck. no hoarseness, no dysphagia.   Cardiovascular: No chest pain or discomfort, no palpitations.  Respiratory: No wheezing. No shortness of breath, no cough, no dyspnea.  Gastrointestinal: Appetite without significant change. No dysphagia, no active abdominal pain, and no melena. No bright red blood per rectum.  Genitourinary: No hematuria, No genital lesion.  No obvious bladder changes.   Endocrine: No polydipsia and no excessive sweating.  Hematologic: No easy bleeding and no tendency for easy bruising.   Integumentary: No obvious masses, No pruritus. No skin lesions and no rash  Musculoskeletal: No new muscle tenderness.  Neurological: No new motor disturbances, or sensory disturbances.   Psychological: Appropriate.    PHYSICAL EXAM     Vitals:   Visit Vitals  BP (!) 148/50   Pulse 74   SpO2 96%       Physical Exam   Constitutional well-developed well-nourished thin female in no acute distress is alert and oriented x3  Is atraumatic and normocephalic  Neck is supple without any JVD the trachea is midline there is no carotid bruits appreciated  Heart with regular rate and rhythm without any clicks rubs or murmurs  Lungs good auscultation bilaterally with any wheezes rales or rhonchi  Abdomen is soft and nontender with positive bowel so  Extremities her feet are cool to touch bilaterally with purple toes on both sides, but there is  brisk capillary refill bilaterally she has easily palpable DP and PT pulses bilaterally easily palpable popliteal and femoral pulses bilaterally there is no ulcerations on her feet bilaterally  IMAGING     Abdominal arctic duplex was reviewed reveals a 3.0 cm AAA which is unchanged from her prior study.    ASSESSMENT/PLAN     Problem List Items Addressed This Visit        Circulatory    Abdominal aortic aneurysm (AAA) without rupture (CMS/HCC) - Primary    Overview     2.9 cm- 2018         Current Assessment & Plan     76-year-old female with a small AAA, recommend follow-up in 1 year.         Relevant Orders    Ultrasound abdominal aorta            RICHMOND Estes  10/22/2021    Thank you very much for allowing us to participate in the care of your patient.  I will keep you informed ofher care.  Please not hesitate to call or email if there are any questions.  I can be reached at [phone](mobile) or [email or web address].  Sincerely.     Davis García       This document was generated utilizing voice recognition technology. An attempt at proofreading has been made to minimize errors but topographical errors may be present.

## 2021-11-17 ENCOUNTER — OFFICE VISIT (OUTPATIENT)
Dept: CARDIOLOGY | Facility: CLINIC | Age: 76
End: 2021-11-17
Payer: MEDICARE

## 2021-11-17 VITALS
WEIGHT: 142 LBS | SYSTOLIC BLOOD PRESSURE: 120 MMHG | OXYGEN SATURATION: 97 % | HEART RATE: 66 BPM | DIASTOLIC BLOOD PRESSURE: 72 MMHG | HEIGHT: 67 IN | BODY MASS INDEX: 22.29 KG/M2 | RESPIRATION RATE: 16 BRPM

## 2021-11-17 DIAGNOSIS — I25.5 ISCHEMIC CARDIOMYOPATHY: ICD-10-CM

## 2021-11-17 DIAGNOSIS — I25.119 CORONARY ARTERY DISEASE INVOLVING NATIVE CORONARY ARTERY OF NATIVE HEART WITH ANGINA PECTORIS (CMS/HCC): Primary | ICD-10-CM

## 2021-11-17 DIAGNOSIS — E78.00 HYPERCHOLESTEROLEMIA: ICD-10-CM

## 2021-11-17 DIAGNOSIS — I25.10 CORONARY ARTERY DISEASE INVOLVING NATIVE CORONARY ARTERY OF NATIVE HEART WITHOUT ANGINA PECTORIS: ICD-10-CM

## 2021-11-17 PROCEDURE — 99215 OFFICE O/P EST HI 40 MIN: CPT | Performed by: INTERNAL MEDICINE

## 2021-11-17 PROCEDURE — 93000 ELECTROCARDIOGRAM COMPLETE: CPT | Performed by: INTERNAL MEDICINE

## 2021-11-17 NOTE — PROGRESS NOTES
Noé Moncada MD  FAC, FSCAI, MRCP([place])  D.Card ([facility])  Interventional Cardiology       Reason for visit : Follow up  HPI   Jennifer Sams is a 76 y.o. female who presents to the office for cardiovascular follow up.  She denies any active cardiac symptoms including chest pain, shortness of breath, orthopnea, PND, palpitation or presyncopal symptoms. 2 weeks ago while she was in the kitchen suddenly lost vision in both eyes for seconds followed by blurring of vision for short period of time. She went to [facility] for evaluation. A CTA scan of head and neck did not show any acute pathology. She does have multifocal atherosclerosis present along the aortic arch and origin of the great vessels which are patent. Right internal carotid artery showed 25 to 30% stenosis but no hemodynamically significant disease in both carotid arteries. Posterior communicating arteries are reported as hypoplastic. No acute intracranial hemorrhage or CT evidence of acute transcortical infarction. She was instructed to see an ophthalmologist and neurologist. She was seen by both subspecialty, according to her etiology still remains unrevealing. She has had no further recurrence of visual disturbances. She remains on dual antiplatelet therapy and statin.         Problem list:  · Coronary artery disease, status post  PCI of the left and to descending artery, right coronary and left circumflex artery in April 2019.  Most recent cardiac catheterization in February 2021 showed patent stents.  Ostial left circumflex artery intermediate lesion is IFR negative.    · Ischemic cardiomyopathy.  EF 45 - 50% in February 2021  · Hypertension  · Dyslipidemia  · 2:1 AV block; status post dual-chamber pacemaker 11/18/19:  · AAA (abdominal aortic aneurysm)  · Epistaxis  · GERD (gastroesophageal reflux disease)   · Glaucoma    · Hiatal hernia,   · History of hip replacement.Osteoarthritis of multiple  joints  · Osteoporosis  · History of rheumatic fever as a child ,  · Raynaud's disease   · RSD (reflex sympathetic dystrophy)   · History of sudden transient visual loss in October 2021          Current Outpatient Medications   Medication Sig   • aspirin (ASPIR-81) 81 mg enteric coated tablet Take 1 tablet (81 mg total) by mouth daily.   • clopidogreL (PLAVIX) 75 mg tablet Take 1 tablet (75 mg total) by mouth daily.   • nitroglycerin (NITROSTAT) 0.4 mg SL tablet Place 1 tablet (0.4 mg total) under the tongue every 5 (five) minutes as needed for chest pain.   • pantoprazole (PROTONIX) 40 mg EC tablet Take 40 mg by mouth daily.   • rosuvastatin (CRESTOR) 20 mg tablet Take 1 tablet (20 mg total) by mouth once daily.     No current facility-administered medications for this visit.        Allergies:  Ace inhibitors, Atorvastatin, Brilinta [ticagrelor], Codeine, Dilaudid [hydromorphone], Morphine sulfate, Onion, and Oxycodone       Review of Systems  Constitution: Negative for chills, fever, weight gain and weight loss.   HENT: Negative for congestion, hearing loss and nosebleeds.    Eyes: Negative for visual disturbance.   Cardiovascular: Negative for chest pain, dyspnea on exertion, orthopnea, PND, palpitation, presyncope, syncope, claudication.    Respiratory: Negative for cough and shortness of breath.    Hematologic/Lymphatic: Does not bruise/bleed easily.   Skin: Negative for rash.   Musculoskeletal: Negative for muscle weakness and myalgias.   Gastrointestinal: Negative for abdominal pain, nausea, vomiting, anorexia, hematemesis, hematochezia, melena.   Genitourinary: Negative for dysuria, frequency and nocturia.   Neurological: Negative for dizziness, focal weakness, headaches, light-headedness, numbness and paresthesias. History of transient sudden loss of vision in October 2021       Objective   Vitals:    11/17/21 1418   BP: 120/72   Pulse: 66   Resp: 16   SpO2: 97%   Weight: 64.4 kg (142 lb)   Height: 1.702 m  "(5' 7\")     Physical Exam  Constitutional: No acute distress.   HENT: Normocephalic and atraumatic. Moist mucous membranes.  Eyes: EOM are normal.   Neck: No JVD present.  Cardiovascular: Normal rate and regular rhythm.   Pulmonary/Chest: Normal effort and air entry. No wheezing, rales, ronchi.  Abdominal: Normal bowel sounds. Soft, non tender.   Musculoskeletal: No bony deformity.  Extremities: No edema.   Neurological: Alert and oriented to person, place, and time.   Skin: Skin is warm and dry. No rash.  Psychiatric: Normal mood, affect and behavior.        Labs:   Lab Results   Component Value Date    WBC 6.27 02/08/2021    HGB 13.4 02/08/2021    HCT 42.5 02/08/2021     02/08/2021    CHOL 162 02/08/2021    TRIG 177 (H) 02/08/2021    HDL 68 02/08/2021    LDLCALC 59 02/08/2021    NONHDLCALC 94 02/08/2021    ALT 15 02/08/2021    AST 24 02/08/2021     02/08/2021    K 3.9 02/08/2021     02/08/2021    CREATININE 1.0 02/08/2021    BUN 14 02/08/2021    CO2 26 02/08/2021    TSH 1.86 01/17/2020    INR 1.0 11/18/2019       ECG :  Ventricular paced rhythm    Cardiac Imaging   TRANSTHORACIC ECHO (TTE) COMPLETE 02/17/2021    Narrative · Left Ventricle: Normal ventricle size. Normal wall thickness. Mildly   decreased systolic function. Estimated EF 45- 50%. No regional wall motion   abnormalities. Grade I diastolic dysfunction. Diastolic inflow pattern   consistent with impaired relaxation. Normal left atrial pressure.  · Aortic Valve: Tricuspid valve. Sclerotic leaflets.  · Sinuses of Valsalva normal-sized. Ascending aorta normal-sized.  · Mild mitral annular calcification.  · Left Atrium: Normal-sized atrium. Normal doppler flow of pulmonary   veins.  · Right Ventricle: Normal ventricle size. Low normal systolic function.   Pacer wire present. Normal wall thickness.  · Right Atrium: Normal-sized atrium. Pacer/ICD lead present.  · Tricuspid Valve: Normal structure. Mild regurgitation. Estimated RVSP =   25 " mmHg. The regurgitation jet is central. Normal hepatic vein systolic   flow.  · IVC/SVC: Normal-sized IVC. IVC collapses >50% during inspiration. Normal   hepatic vein flow.  · Pericardium: Normal structure.        Right and left heart catheterization: 2/12/2021    FINDINGS   1. Normal right and left heart filling pressures (RA 3, PA 20/5 with mean of 11 mmHg, and PCW 6 mm Hg).  2. Patent prior stents in the mid and distal LAD.  3. 50% ostial Cx stenosis, not functionally significant by iFR (0.95). Patent proximal Cx stent.  4. Patent mid and distal RCA stents.        Assessment/Plan     Coronary artery disease.  Non-STEMI 4/26/19; status post PCI of LAD, left circumflex and right coronary artery in April 2019.    Cardiac catheterization in November 2019 and February 2021 showed patent stent with no new lesions.  She is doing well with no recurrence of anginal symptoms. I recommend to continue on aspirin, Plavix and a statin. She is not keen to take any other medications including beta-blocker and ACE inhibitors.    Ischemic cardiomyopathy  Echocardiogram in February 2021 showed EF is  45 - 50%.  Her recent right heart catheterization in February 2021 showed normal right and left-sided filling pressure.  I recommended to reinitiate anti-remodeling agent.  She has intolerance to ACE inhibitors in the past.  I discussed the options of starting on Entresto and beta-blocker.  She is concerned about side effects and opted not to go on GDMT.    Recent transient visual loss.  She had one episode of bilateral vision loss for seconds. Unclear etiology. She was evaluated in [facility]. A CTA showed atherosclerotic disease but patency of the lumen is maintained in the arch of the aorta and is major branches.. There was also no acute intracranial abnormalities. Unlikely embolic, bilateral vision loss would be an extreme coincidence from a single source. She remains on aspirin, Plavix and moderate intensity  statin. Her last lipid profile was satisfactory. Last echocardiogram in February 2021 showed EF of 45 to 50% with no significant abnormalities. She was seen by an ophthalmologist and also by neurology Dr. Peraza. Etiology remains unrevealing.    2:1 AV block; status post dual-chamber pacemaker 11/18/19:  Follows with Dr. Guzman Carolina.     Dyslipidemia  Last blood work shows satisfactory lipid profile.   Continue current dose of rosuvastatin.     Follow up in 6 months.    I thank you for allowing me to participate in the care of your patient.  If I can furnish you with any further details, please do not hesitate to contact me.     Amid TEE Moncada MD     I spent 45 minutes on this date of service performing the following activities: obtaining history, performing examination, entering orders, documenting, preparing for visit, obtaining / reviewing records, providing counseling and education including regarding diet and lifestyle changes to address cardiovascular risk, independently reviewing study/studies, communicating results, and coordinating care

## 2021-11-17 NOTE — LETTER
Sabrina Caruso MD  [address]  [address]  [facility] [address]    Patient: Jennifer Sams  YOB: 1945  Date of Visit: 11/17/2021      Dear Dr. Caruso:    Thank you for referring Jennifer Sams to me for evaluation. Below are my notes for this consultation.    If you have questions, please do not hesitate to call me. I look forward to following your patient along with you.         Sincerely,        Noé Moncada MD        CC: No Recipients         Noé Moncada MD  FACC, FSCAI, MRCP()  D.Card ([facility])  Interventional Cardiology       Reason for visit : Follow up  HPI   Jennifer Sams is a 76 y.o. female who presents to the office for cardiovascular follow up.  She denies any active cardiac symptoms including chest pain, shortness of breath, orthopnea, PND, palpitation or presyncopal symptoms. 2 weeks ago while she was in the kitchen suddenly lost vision in both eyes for seconds followed by blurring of vision for short period of time. She went to [facility] for evaluation. A CTA scan of head and neck did not show any acute pathology. She does have multifocal atherosclerosis present along the aortic arch and origin of the great vessels which are patent. Right internal carotid artery showed 25 to 30% stenosis but no hemodynamically significant disease in both carotid arteries. Posterior communicating arteries are reported as hypoplastic. No acute intracranial hemorrhage or CT evidence of acute transcortical infarction. She was instructed to see an ophthalmologist and neurologist. She was seen by both subspecialty, according to her etiology still remains unrevealing. She has had no further recurrence of visual disturbances. She remains on dual antiplatelet therapy and statin.         Problem list:  · Coronary artery disease, status post  PCI of the left and to descending artery, right coronary and left circumflex artery in April 2019.  Most recent cardiac catheterization  in February 2021 showed patent stents.  Ostial left circumflex artery intermediate lesion is IFR negative.    · Ischemic cardiomyopathy.  EF 45 - 50% in February 2021  · Hypertension  · Dyslipidemia  · 2:1 AV block; status post dual-chamber pacemaker 11/18/19:  · AAA (abdominal aortic aneurysm)  · Epistaxis  · GERD (gastroesophageal reflux disease)   · Glaucoma    · Hiatal hernia,   · History of hip replacement.Osteoarthritis of multiple joints  · Osteoporosis  · History of rheumatic fever as a child ,  · Raynaud's disease   · RSD (reflex sympathetic dystrophy)   · History of sudden transient visual loss in October 2021          Current Outpatient Medications   Medication Sig   • aspirin (ASPIR-81) 81 mg enteric coated tablet Take 1 tablet (81 mg total) by mouth daily.   • clopidogreL (PLAVIX) 75 mg tablet Take 1 tablet (75 mg total) by mouth daily.   • nitroglycerin (NITROSTAT) 0.4 mg SL tablet Place 1 tablet (0.4 mg total) under the tongue every 5 (five) minutes as needed for chest pain.   • pantoprazole (PROTONIX) 40 mg EC tablet Take 40 mg by mouth daily.   • rosuvastatin (CRESTOR) 20 mg tablet Take 1 tablet (20 mg total) by mouth once daily.     No current facility-administered medications for this visit.        Allergies:  Ace inhibitors, Atorvastatin, Brilinta [ticagrelor], Codeine, Dilaudid [hydromorphone], Morphine sulfate, Onion, and Oxycodone       Review of Systems  Constitution: Negative for chills, fever, weight gain and weight loss.   HENT: Negative for congestion, hearing loss and nosebleeds.    Eyes: Negative for visual disturbance.   Cardiovascular: Negative for chest pain, dyspnea on exertion, orthopnea, PND, palpitation, presyncope, syncope, claudication.    Respiratory: Negative for cough and shortness of breath.    Hematologic/Lymphatic: Does not bruise/bleed easily.   Skin: Negative for rash.   Musculoskeletal: Negative for muscle weakness and myalgias.   Gastrointestinal: Negative for  "abdominal pain, nausea, vomiting, anorexia, hematemesis, hematochezia, melena.   Genitourinary: Negative for dysuria, frequency and nocturia.   Neurological: Negative for dizziness, focal weakness, headaches, light-headedness, numbness and paresthesias. History of transient sudden loss of vision in October 2021       Objective   Vitals:    11/17/21 1418   BP: 120/72   Pulse: 66   Resp: 16   SpO2: 97%   Weight: 64.4 kg (142 lb)   Height: 1.702 m (5' 7\")     Physical Exam  Constitutional: No acute distress.   HENT: Normocephalic and atraumatic. Moist mucous membranes.  Eyes: EOM are normal.   Neck: No JVD present.  Cardiovascular: Normal rate and regular rhythm.   Pulmonary/Chest: Normal effort and air entry. No wheezing, rales, ronchi.  Abdominal: Normal bowel sounds. Soft, non tender.   Musculoskeletal: No bony deformity.  Extremities: No edema.   Neurological: Alert and oriented to person, place, and time.   Skin: Skin is warm and dry. No rash.  Psychiatric: Normal mood, affect and behavior.        Labs:   Lab Results   Component Value Date    WBC 6.27 02/08/2021    HGB 13.4 02/08/2021    HCT 42.5 02/08/2021     02/08/2021    CHOL 162 02/08/2021    TRIG 177 (H) 02/08/2021    HDL 68 02/08/2021    LDLCALC 59 02/08/2021    NONHDLCALC 94 02/08/2021    ALT 15 02/08/2021    AST 24 02/08/2021     02/08/2021    K 3.9 02/08/2021     02/08/2021    CREATININE 1.0 02/08/2021    BUN 14 02/08/2021    CO2 26 02/08/2021    TSH 1.86 01/17/2020    INR 1.0 11/18/2019       ECG :  Ventricular paced rhythm    Cardiac Imaging   TRANSTHORACIC ECHO (TTE) COMPLETE 02/17/2021    Narrative · Left Ventricle: Normal ventricle size. Normal wall thickness. Mildly   decreased systolic function. Estimated EF 45- 50%. No regional wall motion   abnormalities. Grade I diastolic dysfunction. Diastolic inflow pattern   consistent with impaired relaxation. Normal left atrial pressure.  · Aortic Valve: Tricuspid valve. Sclerotic " leaflets.  · Sinuses of Valsalva normal-sized. Ascending aorta normal-sized.  · Mild mitral annular calcification.  · Left Atrium: Normal-sized atrium. Normal doppler flow of pulmonary   veins.  · Right Ventricle: Normal ventricle size. Low normal systolic function.   Pacer wire present. Normal wall thickness.  · Right Atrium: Normal-sized atrium. Pacer/ICD lead present.  · Tricuspid Valve: Normal structure. Mild regurgitation. Estimated RVSP =   25 mmHg. The regurgitation jet is central. Normal hepatic vein systolic   flow.  · IVC/SVC: Normal-sized IVC. IVC collapses >50% during inspiration. Normal   hepatic vein flow.  · Pericardium: Normal structure.        Right and left heart catheterization: 2/12/2021    FINDINGS   1. Normal right and left heart filling pressures (RA 3, PA 20/5 with mean of 11 mmHg, and PCW 6 mm Hg).  2. Patent prior stents in the mid and distal LAD.  3. 50% ostial Cx stenosis, not functionally significant by iFR (0.95). Patent proximal Cx stent.  4. Patent mid and distal RCA stents.        Assessment/Plan     Coronary artery disease.  Non-STEMI 4/26/19; status post PCI of LAD, left circumflex and right coronary artery in April 2019.    Cardiac catheterization in November 2019 and February 2021 showed patent stent with no new lesions.  She is doing well with no recurrence of anginal symptoms. I recommend to continue on aspirin, Plavix and a statin. She is not keen to take any other medications including beta-blocker and ACE inhibitors.    Ischemic cardiomyopathy  Echocardiogram in February 2021 showed EF is  45 - 50%.  Her recent right heart catheterization in February 2021 showed normal right and left-sided filling pressure.  I recommended to reinitiate anti-remodeling agent.  She has intolerance to ACE inhibitors in the past.  I discussed the options of starting on Entresto and beta-blocker.  She is concerned about side effects and opted not to go on GDMT.    Recent transient visual  loss.  She had one episode of bilateral vision loss for seconds. Unclear etiology. She was evaluated in [facility]. A CTA showed atherosclerotic disease but patency of the lumen is maintained in the arch of the aorta and is major branches.. There was also no acute intracranial abnormalities. Unlikely embolic, bilateral vision loss would be an extreme coincidence from a single source. She remains on aspirin, Plavix and moderate intensity statin. Her last lipid profile was satisfactory. Last echocardiogram in February 2021 showed EF of 45 to 50% with no significant abnormalities. She was seen by an ophthalmologist and also by neurology Dr. Peraza. Etiology remains unrevealing.    2:1 AV block; status post dual-chamber pacemaker 11/18/19:  Follows with Dr. Guzman Carolina.     Dyslipidemia  Last blood work shows satisfactory lipid profile.   Continue current dose of rosuvastatin.     Follow up in 6 months.    I thank you for allowing me to participate in the care of your patient.  If I can furnish you with any further details, please do not hesitate to contact me.     Amid TEE Moncada MD     I spent 45 minutes on this date of service performing the following activities: obtaining history, performing examination, entering orders, documenting, preparing for visit, obtaining / reviewing records, providing counseling and education including regarding diet and lifestyle changes to address cardiovascular risk, independently reviewing study/studies, communicating results, and coordinating care

## 2021-12-03 ENCOUNTER — IMMUNIZATION (OUTPATIENT)
Dept: IMMUNIZATION | Facility: CLINIC | Age: 76
End: 2021-12-03
Payer: MEDICARE

## 2021-12-03 PROCEDURE — 0064A SARS-COV-2 VACCINE BOOSTER MODERNA: CPT | Performed by: FAMILY MEDICINE

## 2021-12-03 PROCEDURE — 91306 SARS-COV-2 VACCINE BOOSTER MODERNA: CPT | Performed by: FAMILY MEDICINE

## 2021-12-11 LAB
ALBUMIN SERPL-MCNC: 4.1 G/DL (ref 3.6–5.1)
ALBUMIN/GLOB SERPL: 1.9 (CALC) (ref 1–2.5)
ALP SERPL-CCNC: 72 U/L (ref 37–153)
ALT SERPL-CCNC: 9 U/L (ref 6–29)
AST SERPL-CCNC: 21 U/L (ref 10–35)
BASOPHILS # BLD AUTO: 30 CELLS/UL (ref 0–200)
BASOPHILS NFR BLD AUTO: 0.5 %
BILIRUB DIRECT SERPL-MCNC: 0.1 MG/DL
BILIRUB INDIRECT SERPL-MCNC: 0.6 MG/DL (CALC) (ref 0.2–1.2)
BILIRUB SERPL-MCNC: 0.7 MG/DL (ref 0.2–1.2)
BUN SERPL-MCNC: 16 MG/DL (ref 7–25)
BUN/CREAT SERPL: NORMAL (CALC) (ref 6–22)
CALCIUM SERPL-MCNC: 9.1 MG/DL (ref 8.6–10.4)
CHLORIDE SERPL-SCNC: 107 MMOL/L (ref 98–110)
CHOLEST SERPL-MCNC: 161 MG/DL
CHOLEST/HDLC SERPL: 2.4 (CALC)
CO2 SERPL-SCNC: 27 MMOL/L (ref 20–32)
CREAT SERPL-MCNC: 0.84 MG/DL (ref 0.6–0.93)
EOSINOPHIL # BLD AUTO: 148 CELLS/UL (ref 15–500)
EOSINOPHIL NFR BLD AUTO: 2.5 %
ERYTHROCYTE [DISTWIDTH] IN BLOOD BY AUTOMATED COUNT: 12.8 % (ref 11–15)
GLOBULIN SER CALC-MCNC: 2.2 G/DL (CALC) (ref 1.9–3.7)
GLUCOSE SERPL-MCNC: 87 MG/DL (ref 65–99)
HCT VFR BLD AUTO: 40.6 % (ref 35–45)
HDLC SERPL-MCNC: 67 MG/DL
HGB BLD-MCNC: 13.6 G/DL (ref 11.7–15.5)
LDLC SERPL CALC-MCNC: 76 MG/DL (CALC)
LYMPHOCYTES # BLD AUTO: 1109 CELLS/UL (ref 850–3900)
LYMPHOCYTES NFR BLD AUTO: 18.8 %
MCH RBC QN AUTO: 29.5 PG (ref 27–33)
MCHC RBC AUTO-ENTMCNC: 33.5 G/DL (ref 32–36)
MCV RBC AUTO: 88.1 FL (ref 80–100)
MONOCYTES # BLD AUTO: 537 CELLS/UL (ref 200–950)
MONOCYTES NFR BLD AUTO: 9.1 %
NEUTROPHILS # BLD AUTO: 4077 CELLS/UL (ref 1500–7800)
NEUTROPHILS NFR BLD AUTO: 69.1 %
NONHDLC SERPL-MCNC: 94 MG/DL (CALC)
PLATELET # BLD AUTO: 211 THOUSAND/UL (ref 140–400)
PMV BLD REES-ECKER: 9.3 FL (ref 7.5–12.5)
POTASSIUM SERPL-SCNC: 3.9 MMOL/L (ref 3.5–5.3)
PROT SERPL-MCNC: 6.3 G/DL (ref 6.1–8.1)
QUEST EGFR AFRICAN AMERICAN: 78 ML/MIN/1.73M2
QUEST EGFR NON-AFR. AMERICAN: 68 ML/MIN/1.73M2
RBC # BLD AUTO: 4.61 MILLION/UL (ref 3.8–5.1)
SODIUM SERPL-SCNC: 140 MMOL/L (ref 135–146)
TRIGL SERPL-MCNC: 93 MG/DL
WBC # BLD AUTO: 5.9 THOUSAND/UL (ref 3.8–10.8)

## 2021-12-13 ENCOUNTER — OFFICE VISIT (OUTPATIENT)
Dept: FAMILY MEDICINE | Facility: CLINIC | Age: 76
End: 2021-12-13
Payer: MEDICARE

## 2021-12-13 VITALS
SYSTOLIC BLOOD PRESSURE: 128 MMHG | HEART RATE: 65 BPM | OXYGEN SATURATION: 99 % | BODY MASS INDEX: 22.29 KG/M2 | TEMPERATURE: 98 F | RESPIRATION RATE: 16 BRPM | WEIGHT: 142 LBS | DIASTOLIC BLOOD PRESSURE: 78 MMHG | HEIGHT: 67 IN

## 2021-12-13 DIAGNOSIS — J30.2 SEASONAL ALLERGIES: ICD-10-CM

## 2021-12-13 DIAGNOSIS — I25.119 CORONARY ARTERY DISEASE INVOLVING NATIVE CORONARY ARTERY OF NATIVE HEART WITH ANGINA PECTORIS (CMS/HCC): Primary | ICD-10-CM

## 2021-12-13 DIAGNOSIS — I71.40 ABDOMINAL AORTIC ANEURYSM (AAA) WITHOUT RUPTURE (CMS/HCC): ICD-10-CM

## 2021-12-13 PROCEDURE — 99204 OFFICE O/P NEW MOD 45 MIN: CPT | Performed by: FAMILY MEDICINE

## 2021-12-13 NOTE — PROGRESS NOTES
"      Subjective      Patient ID: Jennifer Sams is a 76 y.o. female.  1945      Patient reports office to establish care and for follow-up of multiple chronic medical conditions.    Patient reports she is relatively healthy up until you have a right hip replacement [facility].  Patient then experienced an MI in 2019 which required 8 coronary stents to be placed.  Patient had pacemaker placed in October 2019 as well.  Patient follows with cardiology, Dr. Moncada.  Patient is on dual antiplatelet therapy and rosuvastatin.  Patient has declined further treatment in the form of beta-blocker or Entresto as she wants to be on as few medications as possible.    Patient is on PPI for history of hiatal hernia/reflux which is controlled on medication.    Patient also gets recurrent sinusitis especially in the wintertime that occurs with epistaxis.  Patient feels that over the past year this has been less frequent due to recent masking during Covid pandemic.      The following have been reviewed and updated as appropriate in this visit:   Tobacco  Allergies  Meds  Problems  Med Hx  Surg Hx  Fam Hx  Soc   Hx      Review of Systems   All other systems reviewed and are negative.      Objective     Vitals:    12/13/21 1534   BP: 128/78   BP Location: Right upper arm   Patient Position: Sitting   Pulse: 65   Resp: 16   Temp: 36.7 °C (98 °F)   TempSrc: Oral   SpO2: 99%   Weight: 64.4 kg (142 lb)   Height: 1.702 m (5' 7\")     Body mass index is 22.24 kg/m².    Physical Exam  Vitals and nursing note reviewed.   Constitutional:       Appearance: She is well-developed.   Eyes:      Conjunctiva/sclera: Conjunctivae normal.   Cardiovascular:      Rate and Rhythm: Normal rate and regular rhythm.   Pulmonary:      Effort: Pulmonary effort is normal.      Breath sounds: Normal breath sounds.   Musculoskeletal:         General: No swelling.   Skin:     General: Skin is warm and dry.   Neurological:      General: No " focal deficit present.      Mental Status: She is alert. Mental status is at baseline.         Assessment/Plan   Diagnoses and all orders for this visit:    Coronary artery disease involving native coronary artery of native heart with angina pectoris (CMS/HCC) (Primary)  Assessment & Plan:  Stable.  Patient on dual antiplatelet therapy.  Patient is on rosuvastatin 20 mg daily.  Follows with cardiology.      Abdominal aortic aneurysm (AAA) without rupture (CMS/HCC)  Assessment & Plan:  Patient diagnosis small AAA that will be followed yearly by vascular surgery.      Seasonal allergies  Assessment & Plan:  Patient reports that typical antihistamines do not work well for her so she does not take them.  Recommended not to take pseudoephedrine due to coronary disease.      Recent blood work reviewed with patient.

## 2021-12-13 NOTE — PATIENT INSTRUCTIONS
Patient Education     Abdominal Aortic Aneurysm    An aneurysm is a bulge in a blood vessel that carries blood away from the heart (artery). It happens when blood pushes against a weak or damaged place on the wall of the blood vessel. An abdominal aortic aneurysm happens in the main blood vessel that carries blood away from the heart (aorta).  Most aneurysms do not cause problems, but some do cause problems. If an aneurysm grows, it can burst or tear. This causes bleeding inside you. It is an emergency. It can be life-threatening.  What are the causes?  The exact cause of this condition is not known.  What increases the risk?  The following factors may make you more likely to develop this condition:  · Being male and 60 years of age or older.  · Being of [place]  descent.  · Using nicotine or tobacco now or in the past.  · Having a family history of aneurysms.  · Having any of these problems:  ? Hardening of blood vessels that carry blood away from the heart.  ? Irritation and swelling of the walls of blood vessels that carry blood away from the heart.  ? Certain genetic problems.  ? Being very overweight.  ? An infection in the wall of your aorta.  ? High cholesterol.  ? High blood pressure (hypertension).  What are the signs or symptoms?  Symptoms depend on the size of your aneurysm and how fast it is growing. Most aneurysms grow slowly and do not cause symptoms. If symptoms happen, you may:  · Have very bad pain in your belly (abdomen), side, or low back.  · Feel full after eating only a little food.  · Feel a throbbing lump in your belly.  · Have these problems with your feet or toes:  ? Pain.  ? Skin turning blue.  ? Sores.  · Have trouble pooping (constipation).  · Have trouble peeing (urinating).  If your aneurysm bursts, you may:  · Feel sudden, very bad pain in the belly, side, or back.  · Feel like you may vomit.  · Vomit.  · Feel light-headed.  · Faint.  How is this treated?  Treatment for this  condition depends on:  · The size of your aneurysm.  · How fast it is growing.  · Your age.  · Your risk of having the aneurysm burst.  If your aneurysm is smaller than 2 inches (5 cm), your doctor may:  · Check it often to see if it is growing. You may have an imaging test (ultrasound) to check it every 3-6 months, every year, or every few years.  · Give you medicines for:  ? High blood pressure.  ? Pain.  ? Infection.  If your aneurysm is larger than 2 inches (5 cm), you may need surgery to fix it.  Follow these instructions at home:  Eating and drinking    · Eat a heart-healthy diet. Eat a lot of:  ? Fresh fruits and vegetables.  ? Whole grains.  ? Low-fat (lean) protein.  ? Low-fat dairy products.  · Avoid foods that are high in saturated fat and cholesterol. These foods include red meat and some dairy products.  Lifestyle         · Do not use any products that contain nicotine or tobacco, such as cigarettes, e-cigarettes, and chewing tobacco. If you need help quitting, ask your doctor.  · Stay active and get exercise. Ask your doctor how often to exercise and what types of exercise are safe for you.  · Keep a healthy weight.  Alcohol use  · Do not drink alcohol if:  ? Your doctor tells you not to drink.  ? You are pregnant, may be pregnant, or are planning to become pregnant.  · If you drink alcohol:  ? Limit how much you use to:  § 0-1 drink a day for women.  § 0-2 drinks a day for men.  ? Be aware of how much alcohol is in your drink. In the [place] one drink equals one 12 oz bottle of beer (355 mL), one 5 oz glass of wine (148 mL), or one 1½ oz glass of hard liquor (44 mL).  General instructions  · Take over-the-counter and prescription medicines only as told by your doctor.  · Keep your blood pressure in a normal range. Check it at regular times. Ask your doctor what level it should be.  · Have regular checks of your levels of blood sugar (glucose) and cholesterol. Follow steps to keep these levels near  normal.  · Avoid heavy lifting and activities that take a lot of effort. Ask what activities are safe for you.  · If you can, learn your family's health history.  · Keep all follow-up visits as told by your doctor. This is important.  Contact a doctor if:  · Your belly, side, or back hurts.  · Your belly throbs.  · You have a fever.  Get help right away if:  · You have sudden, bad pain in your belly, side, or back.  · You feel like you may vomit or you vomit.  · You feel light-headed or you faint.  · Your heart beats fast when you stand.  · You have sweaty skin that is cold to the touch (clammy).  · You are short of breath.  · You have trouble pooping.  · You have trouble peeing.  These symptoms may be an emergency. Do not wait to see if the symptoms will go away. Get medical help right away. Call your local emergency services (911 in the [place] Do not drive yourself to the hospital.  Summary  · An aneurysm is a bulge in one of the blood vessels that carry blood away from the heart (artery). An abdominal aortic aneurysm happens in the main blood vessel that carries blood away from the heart (aorta).  · This condition can cause bleeding inside the body. It can be life-threatening.  · Risk can rise if you are male, age 60 or older, and of North  descent. Risk can also rise from nicotine or tobacco use or having aneurysms in the family.  · Get help right away if you have symptoms of a burst aneurysm.  This information is not intended to replace advice given to you by your health care provider. Make sure you discuss any questions you have with your health care provider.  Document Revised: 10/02/2020 Document Reviewed: 10/02/2020  Elsevier Patient Education © 2021 [employer]     Patient Education     Abdominal Aortic Aneurysm    An aneurysm is a bulge in a blood vessel that carries blood away from the heart (artery). It happens when blood pushes against a weak or damaged place on the wall of the blood vessel.  An abdominal aortic aneurysm happens in the main blood vessel that carries blood away from the heart (aorta).  Most aneurysms do not cause problems, but some do cause problems. If an aneurysm grows, it can burst or tear. This causes bleeding inside you. It is an emergency. It can be life-threatening.  What are the causes?  The exact cause of this condition is not known.  What increases the risk?  The following factors may make you more likely to develop this condition:  · Being male and 60 years of age or older.  · Being of North  descent.  · Using nicotine or tobacco now or in the past.  · Having a family history of aneurysms.  · Having any of these problems:  ? Hardening of blood vessels that carry blood away from the heart.  ? Irritation and swelling of the walls of blood vessels that carry blood away from the heart.  ? Certain genetic problems.  ? Being very overweight.  ? An infection in the wall of your aorta.  ? High cholesterol.  ? High blood pressure (hypertension).  What are the signs or symptoms?  Symptoms depend on the size of your aneurysm and how fast it is growing. Most aneurysms grow slowly and do not cause symptoms. If symptoms happen, you may:  · Have very bad pain in your belly (abdomen), side, or low back.  · Feel full after eating only a little food.  · Feel a throbbing lump in your belly.  · Have these problems with your feet or toes:  ? Pain.  ? Skin turning blue.  ? Sores.  · Have trouble pooping (constipation).  · Have trouble peeing (urinating).  If your aneurysm bursts, you may:  · Feel sudden, very bad pain in the belly, side, or back.  · Feel like you may vomit.  · Vomit.  · Feel light-headed.  · Faint.  How is this treated?  Treatment for this condition depends on:  · The size of your aneurysm.  · How fast it is growing.  · Your age.  · Your risk of having the aneurysm burst.  If your aneurysm is smaller than 2 inches (5 cm), your doctor may:  · Check it often to see if it is  growing. You may have an imaging test (ultrasound) to check it every 3-6 months, every year, or every few years.  · Give you medicines for:  ? High blood pressure.  ? Pain.  ? Infection.  If your aneurysm is larger than 2 inches (5 cm), you may need surgery to fix it.  Follow these instructions at home:  Eating and drinking    · Eat a heart-healthy diet. Eat a lot of:  ? Fresh fruits and vegetables.  ? Whole grains.  ? Low-fat (lean) protein.  ? Low-fat dairy products.  · Avoid foods that are high in saturated fat and cholesterol. These foods include red meat and some dairy products.  Lifestyle         · Do not use any products that contain nicotine or tobacco, such as cigarettes, e-cigarettes, and chewing tobacco. If you need help quitting, ask your doctor.  · Stay active and get exercise. Ask your doctor how often to exercise and what types of exercise are safe for you.  · Keep a healthy weight.  Alcohol use  · Do not drink alcohol if:  ? Your doctor tells you not to drink.  ? You are pregnant, may be pregnant, or are planning to become pregnant.  · If you drink alcohol:  ? Limit how much you use to:  § 0-1 drink a day for women.  § 0-2 drinks a day for men.  ? Be aware of how much alcohol is in your drink. In the [place] one drink equals one 12 oz bottle of beer (355 mL), one 5 oz glass of wine (148 mL), or one 1½ oz glass of hard liquor (44 mL).  General instructions  · Take over-the-counter and prescription medicines only as told by your doctor.  · Keep your blood pressure in a normal range. Check it at regular times. Ask your doctor what level it should be.  · Have regular checks of your levels of blood sugar (glucose) and cholesterol. Follow steps to keep these levels near normal.  · Avoid heavy lifting and activities that take a lot of effort. Ask what activities are safe for you.  · If you can, learn your family's health history.  · Keep all follow-up visits as told by your doctor. This is important.  Contact  a doctor if:  · Your belly, side, or back hurts.  · Your belly throbs.  · You have a fever.  Get help right away if:  · You have sudden, bad pain in your belly, side, or back.  · You feel like you may vomit or you vomit.  · You feel light-headed or you faint.  · Your heart beats fast when you stand.  · You have sweaty skin that is cold to the touch (clammy).  · You are short of breath.  · You have trouble pooping.  · You have trouble peeing.  These symptoms may be an emergency. Do not wait to see if the symptoms will go away. Get medical help right away. Call your local emergency services (911 in the [place] Do not drive yourself to the hospital.  Summary  · An aneurysm is a bulge in one of the blood vessels that carry blood away from the heart (artery). An abdominal aortic aneurysm happens in the main blood vessel that carries blood away from the heart (aorta).  · This condition can cause bleeding inside the body. It can be life-threatening.  · Risk can rise if you are male, age 60 or older, and of [place]  descent. Risk can also rise from nicotine or tobacco use or having aneurysms in the family.  · Get help right away if you have symptoms of a burst aneurysm.  This information is not intended to replace advice given to you by your health care provider. Make sure you discuss any questions you have with your health care provider.  Document Revised: 10/02/2020 Document Reviewed: 10/02/2020  Elsevier Patient Education © 2021 [employer]

## 2021-12-14 NOTE — ASSESSMENT & PLAN NOTE
Stable.  Patient on dual antiplatelet therapy.  Patient is on rosuvastatin 20 mg daily.  Follows with cardiology.

## 2021-12-14 NOTE — ASSESSMENT & PLAN NOTE
Patient reports that typical antihistamines do not work well for her so she does not take them.  Recommended not to take pseudoephedrine due to coronary disease.      Recent blood work reviewed with patient.

## 2022-01-18 NOTE — PROGRESS NOTES
Noé Moncada MD  FAC, FSCAI, MRCP([place])  D.Card ([facility])  Interventional Cardiology       Reason for visit : Follow up  HPI   Jennifer Sams is a 77 y.o. female who presents to the office for cardiovascular follow up.  She is accompanied to today's visit by her .  She was last seen in November.  She has basically been bedbound since December 31 with bronchitis and sinusitis.  She did see her primary care doctor and was treated with amoxicillin.  She does feel increased dyspnea on exertion with her deconditioning.  As well she has a persistent nonproductive cough.  She denies chest pain at rest or with exertion, shortness of breath, orthopnea, PND, palpitations or lightheadedness.  She did receive 3 doses of the Moderna COVID-19 vaccine.  She does not believe in the flu vaccine.           Problem List:  · Coronary artery disease, status post  PCI of the left and to descending artery, right coronary and left circumflex artery in April 2019.  Most recent cardiac catheterization in February 2021 showed patent stents.  Ostial left circumflex artery intermediate lesion is IFR negative.    · Ischemic cardiomyopathy.  EF 45 - 50% in February 2021  · Hypertension  · Dyslipidemia  · 2:1 AV block; status post dual-chamber pacemaker 11/18/19:  · AAA (abdominal aortic aneurysm)  · Epistaxis  · GERD (gastroesophageal reflux disease)   · Glaucoma    · Hiatal hernia,   · History of hip replacement.Osteoarthritis of multiple joints  · Osteoporosis  · History of rheumatic fever as a child ,  · Raynaud's disease   · RSD (reflex sympathetic dystrophy)   · History of sudden transient visual loss in October 2021           Current Outpatient Medications   Medication Sig   • aspirin (ASPIR-81) 81 mg enteric coated tablet Take 1 tablet (81 mg total) by mouth daily.   • clopidogreL (PLAVIX) 75 mg tablet Take 1 tablet (75 mg total) by mouth daily.   • nitroglycerin (NITROSTAT) 0.4 mg SL tablet Place 1 tablet (0.4 mg  "total) under the tongue every 5 (five) minutes as needed for chest pain.   • pantoprazole (PROTONIX) 40 mg EC tablet Take 40 mg by mouth daily.   • rosuvastatin (CRESTOR) 20 mg tablet Take 1 tablet (20 mg total) by mouth once daily.          Allergies:  Ace inhibitors, Atorvastatin, Brilinta [ticagrelor], Codeine, Dilaudid [hydromorphone], Morphine sulfate, Onion, and Oxycodone       Review of Systems  Constitution: Negative for chills, fever,positive weight loss.  Increased fatigue.  HENT: Negative for congestion, hearing loss and nosebleeds.    Eyes: Negative for visual disturbance.   Cardiovascular: Negative for chest pain, no orthopnea, PND, palpitation, presyncope, syncope, claudication.    Respiratory: Negative for cough and shortness of breath.  Positive nonproductive cough.    Hematologic/Lymphatic: Does not bruise/bleed easily.   Skin: Negative for rash.   Musculoskeletal: Negative for muscle weakness and myalgias.  Positive joint stiffness.  Gastrointestinal: Negative for abdominal pain, nausea, vomiting, anorexia, hematemesis, hematochezia, melena.   Genitourinary: Negative for dysuria, frequency and nocturia.   Neurological: Negative for dizziness, focal weakness, headaches, light-headedness, numbness and paresthesias.        Objective   Vitals:    01/19/22 1253   BP: 118/70   BP Location: Left upper arm   Patient Position: Sitting   Pulse: 65   Resp: 18   SpO2: 98%   Weight: 63 kg (139 lb)   Height: 1.702 m (5' 7\")     Wt Readings from Last 3 Encounters:   01/19/22 63 kg (139 lb)   12/13/21 64.4 kg (142 lb)   11/17/21 64.4 kg (142 lb)     Physical Exam  Constitutional: No acute distress.  Well-developed and well-nourished.  HENT: Normocephalic and atraumatic. Moist mucous membranes.  Eyes: EOM are normal.   Neck: No JVD present.  No carotid bruit.  Cardiovascular: Normal rate and regular rhythm.  No murmur, gallop or rub.  Pulmonary/Chest: Normal effort and air entry. No wheezing, rales, " bernadette.  Abdominal: Normal bowel sounds. Soft, non tender.   Musculoskeletal: No bony deformity.  Extremities: No edema.   Neurological: Alert and oriented to person, place, and time.   Skin: Skin is warm and dry. No rash.  Psychiatric: Normal mood, affect and behavior.        Labs:   Lab Results   Component Value Date    WBC 5.9 12/10/2021    HGB 13.6 12/10/2021        HCT 40.6 12/10/2021     12/10/2021    CHOL 161 12/10/2021    TRIG 93 12/10/2021    HDL 67 12/10/2021    LDLCALC 76 12/10/2021    NONHDLCALC 94 02/08/2021    ALT 9 12/10/2021    AST 21 12/10/2021     12/10/2021    K 3.9 12/10/2021     12/10/2021    CREATININE 0.84 12/10/2021    BUN 16 12/10/2021    CO2 27 12/10/2021    TSH 1.86 01/17/2020    INR 1.0 11/18/2019       ECG :  Ventricular paced rhythm            Cardiac Imaging    TRANSTHORACIC ECHO (TTE) COMPLETE 02/17/2021    Interpretation Summary  · Left Ventricle: Normal ventricle size. Normal wall thickness. Mildly decreased systolic function. Estimated EF 45- 50%. No regional wall motion abnormalities. Grade I diastolic dysfunction. Diastolic inflow pattern consistent with impaired relaxation. Normal left atrial pressure.  · Aortic Valve: Tricuspid valve. Sclerotic leaflets.  · Sinuses of Valsalva normal-sized. Ascending aorta normal-sized.  · Mild mitral annular calcification.  · Left Atrium: Normal-sized atrium. Normal doppler flow of pulmonary veins.  · Right Ventricle: Normal ventricle size. Low normal systolic function. Pacer wire present. Normal wall thickness.  · Right Atrium: Normal-sized atrium. Pacer/ICD lead present.  · Tricuspid Valve: Normal structure. Mild regurgitation. Estimated RVSP = 25 mmHg. The regurgitation jet is central. Normal hepatic vein systolic flow.  · IVC/SVC: Normal-sized IVC. IVC collapses >50% during inspiration. Normal hepatic vein flow.  · Pericardium: Normal structure.             Assessment/Plan   Coronary artery disease.   He has a history of  a non-STEMI in April 2019, status post PCI of the LAD, left for circumflex and right coronary artery in April 2019.  Previous cardiac catheterization in November 2019 and February 2021 showed patent stents with no new lesions.  She remains free of anginal symptoms.  She does have dyspnea on exertion related to her ongoing bronchitis.  She also notes some dizziness with position changes. We recommend continued medical management and aggressive risk factor modifications.  She is allergic to ACE inhibitors.  She is not interested in beta-blocker or Entresto therapy.    Ischemic cardiomyopathy  Echocardiogram in February 2021 that showed EF 45 to 50%.  During that time she underwent a right heart catheterization with normal right and left-sided filling pressures.  Again, beta-blocker and Entresto therapy has been offered, but declined related to fear of side effects.  We would like to repeat an echocardiogram prior to her next scheduled appointment.    Recent transient visual loss.  He has not had any recent episodes.. She was seen by an ophthalmologist and also by neurology Dr. Peraza. Etiology remains unrevealing.     2:1 AV block; status post dual-chamber pacemaker 11/18/19:  Follows with Dr. Guzman Carolina.      Dyslipidemia  Last blood work shows satisfactory lipid profile.   Continue current dose of rosuvastatin.      Follow up in 3 months.  She is aware to contact the office immediately with any change in symptoms.    I thank you for allowing me to participate in the care of your patient.  If I can furnish you with any further details, please do not hesitate to contact me.    I, Blanca Goodman, am scribing for, and in the presence of, Noé Moncada MD.    I, Noé Moncada MD, personally performed the services described in this documentation as scribed by Blanca Goodman in my presence, and it is both accurate and complete.    Noé Moncada MD    This letter was generated using speech recognition software. Please  excuse any typographical errors.

## 2022-01-19 ENCOUNTER — OFFICE VISIT (OUTPATIENT)
Dept: CARDIOLOGY | Facility: CLINIC | Age: 77
End: 2022-01-19
Payer: MEDICARE

## 2022-01-19 VITALS
WEIGHT: 139 LBS | RESPIRATION RATE: 18 BRPM | BODY MASS INDEX: 21.82 KG/M2 | SYSTOLIC BLOOD PRESSURE: 118 MMHG | DIASTOLIC BLOOD PRESSURE: 70 MMHG | HEIGHT: 67 IN | OXYGEN SATURATION: 98 % | HEART RATE: 65 BPM

## 2022-01-19 DIAGNOSIS — R06.09 DOE (DYSPNEA ON EXERTION): ICD-10-CM

## 2022-01-19 DIAGNOSIS — I25.5 ISCHEMIC CARDIOMYOPATHY: ICD-10-CM

## 2022-01-19 DIAGNOSIS — E78.00 HYPERCHOLESTEROLEMIA: ICD-10-CM

## 2022-01-19 DIAGNOSIS — I25.119 CORONARY ARTERY DISEASE INVOLVING NATIVE CORONARY ARTERY OF NATIVE HEART WITH ANGINA PECTORIS (CMS/HCC): Primary | ICD-10-CM

## 2022-01-19 PROCEDURE — 99214 OFFICE O/P EST MOD 30 MIN: CPT | Performed by: INTERNAL MEDICINE

## 2022-01-19 PROCEDURE — 93000 ELECTROCARDIOGRAM COMPLETE: CPT | Performed by: INTERNAL MEDICINE

## 2022-01-19 NOTE — PATIENT INSTRUCTIONS
Patient Education     Heart-Healthy Eating Plan  Heart-healthy meal planning includes:  · Eating less unhealthy fats.  · Eating more healthy fats.  · Making other changes in your diet.  Talk with your doctor or a diet specialist (dietitian) to create an eating plan that is right for you.  What is my plan?  Your doctor may recommend an eating plan that includes:  · Total fat: ______% or less of total calories a day.  · Saturated fat: ______% or less of total calories a day.  · Cholesterol: less than _________mg a day.  What are tips for following this plan?  Cooking  Avoid frying your food. Try to bake, boil, grill, or broil it instead. You can also reduce fat by:  · Removing the skin from poultry.  · Removing all visible fats from meats.  · Steaming vegetables in water or broth.  Meal planning    · At meals, divide your plate into four equal parts:  ? Fill one-half of your plate with vegetables and green salads.  ? Fill one-fourth of your plate with whole grains.  ? Fill one-fourth of your plate with lean protein foods.  · Eat 4-5 servings of vegetables per day. A serving of vegetables is:  ? 1 cup of raw or cooked vegetables.  ? 2 cups of raw leafy greens.  · Eat 4-5 servings of fruit per day. A serving of fruit is:  ? 1 medium whole fruit.  ? ¼ cup of dried fruit.  ? ½ cup of fresh, frozen, or canned fruit.  ? ½ cup of 100% fruit juice.  · Eat more foods that have soluble fiber. These are apples, broccoli, carrots, beans, peas, and barley. Try to get 20-30 g of fiber per day.  · Eat 4-5 servings of nuts, legumes, and seeds per week:  ? 1 serving of dried beans or legumes equals ½ cup after being cooked.  ? 1 serving of nuts is ¼ cup.  ? 1 serving of seeds equals 1 tablespoon.  General information  · Eat more home-cooked food. Eat less restaurant, buffet, and fast food.  · Limit or avoid alcohol.  · Limit foods that are high in starch and sugar.  · Avoid fried foods.  · Lose weight if you are overweight.  · Keep  track of how much salt (sodium) you eat. This is important if you have high blood pressure. Ask your doctor to tell you more about this.  · Try to add vegetarian meals each week.  Fats  · Choose healthy fats. These include olive oil and canola oil, flaxseeds, walnuts, almonds, and seeds.  · Eat more omega-3 fats. These include salmon, mackerel, sardines, tuna, flaxseed oil, and ground flaxseeds. Try to eat fish at least 2 times each week.  · Check food labels. Avoid foods with trans fats or high amounts of saturated fat.  · Limit saturated fats.  ? These are often found in animal products, such as meats, butter, and cream.  ? These are also found in plant foods, such as palm oil, palm kernel oil, and coconut oil.  · Avoid foods with partially hydrogenated oils in them. These have trans fats. Examples are stick margarine, some tub margarines, cookies, crackers, and other baked goods.  What foods can I eat?  Fruits  All fresh, canned (in natural juice), or frozen fruits.  Vegetables  Fresh or frozen vegetables (raw, steamed, roasted, or grilled). Green salads.  Grains  Most grains. Choose whole wheat and whole grains most of the time. Rice and pasta, including brown rice and pastas made with whole wheat.  Meats and other proteins  Lean, well-trimmed beef, veal, pork, and lamb. Chicken and turkey without skin. All fish and shellfish. Wild duck, rabbit, pheasant, and venison. Egg whites or low-cholesterol egg substitutes. Dried beans, peas, lentils, and tofu. Seeds and most nuts.  Dairy  Low-fat or nonfat cheeses, including ricotta and mozzarella. Skim or 1% milk that is liquid, powdered, or evaporated. Buttermilk that is made with low-fat milk. Nonfat or low-fat yogurt.  Fats and oils  Non-hydrogenated (trans-free) margarines. Vegetable oils, including soybean, sesame, sunflower, olive, peanut, safflower, corn, canola, and cottonseed. Salad dressings or mayonnaise made with a vegetable oil.  Beverages  Mineral water.  Coffee and tea. Diet carbonated beverages.  Sweets and desserts  Sherbet, gelatin, and fruit ice. Small amounts of dark chocolate.  Limit all sweets and desserts.  Seasonings and condiments  All seasonings and condiments.  The items listed above may not be a complete list of foods and drinks you can eat. Contact a dietitian for more options.  What foods should I avoid?  Fruits  Canned fruit in heavy syrup. Fruit in cream or butter sauce. Fried fruit. Limit coconut.  Vegetables  Vegetables cooked in cheese, cream, or butter sauce. Fried vegetables.  Grains  Breads that are made with saturated or trans fats, oils, or whole milk. Croissants. Sweet rolls. Donuts. High-fat crackers, such as cheese crackers.  Meats and other proteins  Fatty meats, such as hot dogs, ribs, sausage, fay, rib-eye roast or steak. High-fat deli meats, such as salami and bologna. Caviar. Domestic duck and goose. Organ meats, such as liver.  Dairy  Cream, sour cream, cream cheese, and creamed cottage cheese. Whole-milk cheeses. Whole or 2% milk that is liquid, evaporated, or condensed. Whole buttermilk. Cream sauce or high-fat cheese sauce. Yogurt that is made from whole milk.  Fats and oils  Meat fat, or shortening. Cocoa butter, hydrogenated oils, palm oil, coconut oil, palm kernel oil. Solid fats and shortenings, including fay fat, salt pork, lard, and butter. Nondairy cream substitutes. Salad dressings with cheese or sour cream.  Beverages  Regular sodas and juice drinks with added sugar.  Sweets and desserts  Frosting. Pudding. Cookies. Cakes. Pies. Milk chocolate or white chocolate. Buttered syrups. Full-fat ice cream or ice cream drinks.  The items listed above may not be a complete list of foods and drinks to avoid. Contact a dietitian for more information.  Summary  · Heart-healthy meal planning includes eating less unhealthy fats, eating more healthy fats, and making other changes in your diet.  · Eat a balanced diet. This includes  fruits and vegetables, low-fat or nonfat dairy, lean protein, nuts and legumes, whole grains, and heart-healthy oils and fats.  This information is not intended to replace advice given to you by your health care provider. Make sure you discuss any questions you have with your health care provider.  Document Revised: 02/21/2019 Document Reviewed: 01/25/2019  [employer] Patient Education © 2021 [employer]

## 2022-01-19 NOTE — LETTER
Eliu Negrete MD  [address] [address]    Patient: Jennifer Sams  YOB: 1945  Date of Visit: 1/19/2022      Dear Dr. Negrete:    Thank you for referring Jennifer Sams to me for evaluation. Below are my notes for this consultation.    If you have questions, please do not hesitate to call me. I look forward to following your patient along with you.         Sincerely,        Noé Moncada MD        CC: No Recipients  Blanca Goodman CRNP  1/19/2022  1:59 PM  Signed       Noé Moncada MD  FACC, FSCAI, MRCP([place])  D.Card ([facility])  Interventional Cardiology       Reason for visit : Follow up  HPI   Jennifer Sams is a 77 y.o. female who presents to the office for cardiovascular follow up.  She is accompanied to today's visit by her .  She was last seen in November.  She has basically been bedbound since December 31 with bronchitis and sinusitis.  She did see her primary care doctor and was treated with amoxicillin.  She does feel increased dyspnea on exertion with her deconditioning.  As well she has a persistent nonproductive cough.  She denies chest pain at rest or with exertion, shortness of breath, orthopnea, PND, palpitations or lightheadedness.  She did receive 3 doses of the Moderna COVID-19 vaccine.  She does not believe in the flu vaccine.           Problem List:  · Coronary artery disease, status post  PCI of the left and to descending artery, right coronary and left circumflex artery in April 2019.  Most recent cardiac catheterization in February 2021 showed patent stents.  Ostial left circumflex artery intermediate lesion is IFR negative.    · Ischemic cardiomyopathy.  EF 45 - 50% in February 2021  · Hypertension  · Dyslipidemia  · 2:1 AV block; status post dual-chamber pacemaker 11/18/19:  · AAA (abdominal aortic aneurysm)  · Epistaxis  · GERD (gastroesophageal reflux disease)   · Glaucoma    · Hiatal hernia,   · History of hip  replacement.Osteoarthritis of multiple joints  · Osteoporosis  · History of rheumatic fever as a child ,  · Raynaud's disease   · RSD (reflex sympathetic dystrophy)   · History of sudden transient visual loss in October 2021           Current Outpatient Medications   Medication Sig   • aspirin (ASPIR-81) 81 mg enteric coated tablet Take 1 tablet (81 mg total) by mouth daily.   • clopidogreL (PLAVIX) 75 mg tablet Take 1 tablet (75 mg total) by mouth daily.   • nitroglycerin (NITROSTAT) 0.4 mg SL tablet Place 1 tablet (0.4 mg total) under the tongue every 5 (five) minutes as needed for chest pain.   • pantoprazole (PROTONIX) 40 mg EC tablet Take 40 mg by mouth daily.   • rosuvastatin (CRESTOR) 20 mg tablet Take 1 tablet (20 mg total) by mouth once daily.          Allergies:  Ace inhibitors, Atorvastatin, Brilinta [ticagrelor], Codeine, Dilaudid [hydromorphone], Morphine sulfate, Onion, and Oxycodone       Review of Systems  Constitution: Negative for chills, fever,positive weight loss.  Increased fatigue.  HENT: Negative for congestion, hearing loss and nosebleeds.    Eyes: Negative for visual disturbance.   Cardiovascular: Negative for chest pain, no orthopnea, PND, palpitation, presyncope, syncope, claudication.    Respiratory: Negative for cough and shortness of breath.  Positive nonproductive cough.    Hematologic/Lymphatic: Does not bruise/bleed easily.   Skin: Negative for rash.   Musculoskeletal: Negative for muscle weakness and myalgias.  Positive joint stiffness.  Gastrointestinal: Negative for abdominal pain, nausea, vomiting, anorexia, hematemesis, hematochezia, melena.   Genitourinary: Negative for dysuria, frequency and nocturia.   Neurological: Negative for dizziness, focal weakness, headaches, light-headedness, numbness and paresthesias.        Objective   Vitals:    01/19/22 1253   BP: 118/70   BP Location: Left upper arm   Patient Position: Sitting   Pulse: 65   Resp: 18   SpO2: 98%   Weight: 63 kg  "(139 lb)   Height: 1.702 m (5' 7\")     Wt Readings from Last 3 Encounters:   01/19/22 63 kg (139 lb)   12/13/21 64.4 kg (142 lb)   11/17/21 64.4 kg (142 lb)     Physical Exam  Constitutional: No acute distress.  Well-developed and well-nourished.  HENT: Normocephalic and atraumatic. Moist mucous membranes.  Eyes: EOM are normal.   Neck: No JVD present.  No carotid bruit.  Cardiovascular: Normal rate and regular rhythm.  No murmur, gallop or rub.  Pulmonary/Chest: Normal effort and air entry. No wheezing, rales, ronchi.  Abdominal: Normal bowel sounds. Soft, non tender.   Musculoskeletal: No bony deformity.  Extremities: No edema.   Neurological: Alert and oriented to person, place, and time.   Skin: Skin is warm and dry. No rash.  Psychiatric: Normal mood, affect and behavior.        Labs:   Lab Results   Component Value Date    WBC 5.9 12/10/2021    HGB 13.6 12/10/2021        HCT 40.6 12/10/2021     12/10/2021    CHOL 161 12/10/2021    TRIG 93 12/10/2021    HDL 67 12/10/2021    LDLCALC 76 12/10/2021    NONHDLCALC 94 02/08/2021    ALT 9 12/10/2021    AST 21 12/10/2021     12/10/2021    K 3.9 12/10/2021     12/10/2021    CREATININE 0.84 12/10/2021    BUN 16 12/10/2021    CO2 27 12/10/2021    TSH 1.86 01/17/2020    INR 1.0 11/18/2019       ECG :  Ventricular paced rhythm            Cardiac Imaging    TRANSTHORACIC ECHO (TTE) COMPLETE 02/17/2021    Interpretation Summary  · Left Ventricle: Normal ventricle size. Normal wall thickness. Mildly decreased systolic function. Estimated EF 45- 50%. No regional wall motion abnormalities. Grade I diastolic dysfunction. Diastolic inflow pattern consistent with impaired relaxation. Normal left atrial pressure.  · Aortic Valve: Tricuspid valve. Sclerotic leaflets.  · Sinuses of Valsalva normal-sized. Ascending aorta normal-sized.  · Mild mitral annular calcification.  · Left Atrium: Normal-sized atrium. Normal doppler flow of pulmonary veins.  · Right Ventricle: " Normal ventricle size. Low normal systolic function. Pacer wire present. Normal wall thickness.  · Right Atrium: Normal-sized atrium. Pacer/ICD lead present.  · Tricuspid Valve: Normal structure. Mild regurgitation. Estimated RVSP = 25 mmHg. The regurgitation jet is central. Normal hepatic vein systolic flow.  · IVC/SVC: Normal-sized IVC. IVC collapses >50% during inspiration. Normal hepatic vein flow.  · Pericardium: Normal structure.             Assessment/Plan   Coronary artery disease.   He has a history of a non-STEMI in April 2019, status post PCI of the LAD, left for circumflex and right coronary artery in April 2019.  Previous cardiac catheterization in November 2019 and February 2021 showed patent stents with no new lesions.  She remains free of anginal symptoms.  She does have dyspnea on exertion related to her ongoing bronchitis.  She also notes some dizziness with position changes. We recommend continued medical management and aggressive risk factor modifications.  She is allergic to ACE inhibitors.  She is not interested in beta-blocker or Entresto therapy.    Ischemic cardiomyopathy  Echocardiogram in February 2021 that showed EF 45 to 50%.  During that time she underwent a right heart catheterization with normal right and left-sided filling pressures.  Again, beta-blocker and Entresto therapy has been offered, but declined related to fear of side effects.  We would like to repeat an echocardiogram prior to her next scheduled appointment.    Recent transient visual loss.  He has not had any recent episodes.. She was seen by an ophthalmologist and also by neurology Dr. Peraza. Etiology remains unrevealing.     2:1 AV block; status post dual-chamber pacemaker 11/18/19:  Follows with Dr. Guzman Carolina.      Dyslipidemia  Last blood work shows satisfactory lipid profile.   Continue current dose of rosuvastatin.      Follow up in 3 months.  She is aware to contact the office immediately with any change in  symptoms.    I thank you for allowing me to participate in the care of your patient.  If I can furnish you with any further details, please do not hesitate to contact me.    I, Blanca Goodman, am scribing for, and in the presence of, Noé Moncada MD.    I, Noé Moncada MD, personally performed the services described in this documentation as scribed by Blanca Goodman in my presence, and it is both accurate and complete.    Noé Moncada MD    This letter was generated using speech recognition software. Please excuse any typographical errors.

## 2022-02-05 LAB
ALBUMIN SERPL-MCNC: 4.2 G/DL (ref 3.6–5.1)
ALBUMIN/GLOB SERPL: 1.9 (CALC) (ref 1–2.5)
ALP SERPL-CCNC: 72 U/L (ref 37–153)
ALT SERPL-CCNC: 13 U/L (ref 6–29)
AST SERPL-CCNC: 22 U/L (ref 10–35)
BILIRUB DIRECT SERPL-MCNC: 0.1 MG/DL
BILIRUB INDIRECT SERPL-MCNC: 0.5 MG/DL (CALC) (ref 0.2–1.2)
BILIRUB SERPL-MCNC: 0.6 MG/DL (ref 0.2–1.2)
BUN SERPL-MCNC: 14 MG/DL (ref 7–25)
BUN/CREAT SERPL: NORMAL (CALC) (ref 6–22)
CALCIUM SERPL-MCNC: 9.1 MG/DL (ref 8.6–10.4)
CHLORIDE SERPL-SCNC: 107 MMOL/L (ref 98–110)
CHOLEST SERPL-MCNC: 156 MG/DL
CHOLEST/HDLC SERPL: 2.2 (CALC)
CO2 SERPL-SCNC: 28 MMOL/L (ref 20–32)
CREAT SERPL-MCNC: 0.89 MG/DL (ref 0.6–0.93)
ERYTHROCYTE [DISTWIDTH] IN BLOOD BY AUTOMATED COUNT: 12.8 % (ref 11–15)
GLOBULIN SER CALC-MCNC: 2.2 G/DL (CALC) (ref 1.9–3.7)
GLUCOSE SERPL-MCNC: 92 MG/DL (ref 65–99)
HCT VFR BLD AUTO: 40.5 % (ref 35–45)
HDLC SERPL-MCNC: 71 MG/DL
HGB BLD-MCNC: 13 G/DL (ref 11.7–15.5)
LDLC SERPL CALC-MCNC: 66 MG/DL (CALC)
MCH RBC QN AUTO: 28.6 PG (ref 27–33)
MCHC RBC AUTO-ENTMCNC: 32.1 G/DL (ref 32–36)
MCV RBC AUTO: 89.2 FL (ref 80–100)
NONHDLC SERPL-MCNC: 85 MG/DL (CALC)
PLATELET # BLD AUTO: 195 THOUSAND/UL (ref 140–400)
PMV BLD REES-ECKER: 9.8 FL (ref 7.5–12.5)
POTASSIUM SERPL-SCNC: 4 MMOL/L (ref 3.5–5.3)
PROT SERPL-MCNC: 6.4 G/DL (ref 6.1–8.1)
QUEST EGFR AFRICAN AMERICAN: 72 ML/MIN/1.73M2
QUEST EGFR NON-AFR. AMERICAN: 63 ML/MIN/1.73M2
RBC # BLD AUTO: 4.54 MILLION/UL (ref 3.8–5.1)
SODIUM SERPL-SCNC: 140 MMOL/L (ref 135–146)
TRIGL SERPL-MCNC: 107 MG/DL
WBC # BLD AUTO: 5.8 THOUSAND/UL (ref 3.8–10.8)

## 2022-02-09 ENCOUNTER — HOSPITAL ENCOUNTER (OUTPATIENT)
Dept: CARDIOLOGY | Facility: HOSPITAL | Age: 77
Discharge: HOME | End: 2022-02-09
Attending: INTERNAL MEDICINE
Payer: MEDICARE

## 2022-02-09 VITALS
DIASTOLIC BLOOD PRESSURE: 67 MMHG | HEIGHT: 67 IN | SYSTOLIC BLOOD PRESSURE: 149 MMHG | BODY MASS INDEX: 21.8 KG/M2 | WEIGHT: 138.89 LBS | HEART RATE: 63 BPM

## 2022-02-09 DIAGNOSIS — I25.5 ISCHEMIC CARDIOMYOPATHY: ICD-10-CM

## 2022-02-09 PROCEDURE — 93306 TTE W/DOPPLER COMPLETE: CPT | Mod: 26 | Performed by: INTERNAL MEDICINE

## 2022-02-09 PROCEDURE — 93306 TTE W/DOPPLER COMPLETE: CPT

## 2022-02-10 LAB
AORTIC SINUS VALSALVA: 2.9 CM
AORTIC VALVE AREA: 1.8 SQUARE CENTIMETERS PER SQUARE METER
AORTIC VALVE VELOCITY TIME INTEGRAL: 14 CM
AV MEAN GRADIENT: 1 MMHG
AV PEAK GRADIENT: 2.15 MMHG
AV PEAK VELOCITY-S: 0.73 M/S
BSA FOR ECHO PROCEDURE: 1.73 M2
CUSP SEPARATION: 1.2 CM
DOP CALC LVOT STROKE VOLUME: 48.47 ML
DOP CALC RVOT VTI: 13.46 CM
E WAVE DECELERATION TIME: 385.87 MS
E/A RATIO: 0.61
ECHO EF ESTIMATED: 45 %
EJECTION FRACTION: 45 %
EST RIGHT VENT SYSTOLIC PRESSURE BY TRICUSPID REGURGITATION JET: 27 MMHG
ESTIMATED PASP: 27 MMHG
INTERVENTRICULAR SEPTUM: 1.3 CM
LAD 2D: 3.4 CM
LEFT ATRIUM VOLUME INDEX: 21.97 ML/M2
LEFT ATRIUM VOLUME: 38 ML
LEFT INTERNAL DIMENSION IN SYSTOLE: 2.9 CM (ref 2.41–3.65)
LEFT VENTRICULAR INTERNAL DIMENSION IN DIASTOLE: 4 CM (ref 4.07–5.65)
LVOT 2D: 2.1 CM
LVOT A: 3.28 CM2
LVOT MG: 1.07 MMHG
LVOT MV: 0.49 M/S
LVOT PEAK VELOCITY: 0.7 M/S
LVOT PG: 0.7 MMHG
LVOT STROKE VOLUME INDEX: 28 ML/M2
LVOT VTI: 14 CM
MV PEAK A VEL: 0.61 M/S
MV PEAK E VEL: 0.37 M/S
MV VALVE AREA P 1/2 METHOD: 1.97 CM2
POSTERIOR WALL: 1.3 CM
RAP: 8 MMHG
RVOT VMAX: 0.66 M/S
RVOT VTI: 1.35 CM
SINUS: 2.74 CM
TR MAX PG: 19 MMHG
TRICUSPID VALVE PEAK REGURGITATION VELOCITY: 2.2 M/S
Z-SCORE OF LEFT VENTRICULAR DIMENSION IN END DIASTOLE: -1.81
Z-SCORE OF LEFT VENTRICULAR DIMENSION IN END SYSTOLE: -0.18

## 2022-02-11 ENCOUNTER — TELEPHONE (OUTPATIENT)
Dept: SCHEDULING | Facility: CLINIC | Age: 77
End: 2022-02-11
Payer: MEDICARE

## 2022-02-11 NOTE — TELEPHONE ENCOUNTER
Jennifer called requesting to speak with Blanca, she received her echo results via the portal and she would like to discuss further   She can be reached @ [phone]

## 2022-02-14 ENCOUNTER — TELEPHONE (OUTPATIENT)
Dept: CARDIOLOGY | Facility: CLINIC | Age: 77
End: 2022-02-14
Payer: MEDICARE

## 2022-02-14 DIAGNOSIS — R93.1 ABNORMAL ECHOCARDIOGRAM: ICD-10-CM

## 2022-02-14 DIAGNOSIS — I25.10 CORONARY ARTERY DISEASE INVOLVING NATIVE CORONARY ARTERY OF NATIVE HEART, UNSPECIFIED WHETHER ANGINA PRESENT: Primary | ICD-10-CM

## 2022-02-14 NOTE — TELEPHONE ENCOUNTER
Spoke with the patient regarding result of recent echocardiogram.  Ejection fraction is 45%, no significant change.  There is wall motion abnormality noted in recent echocardiogram.  Patient has increasing dyspnea on exertion.  Therefore I recommend regadenoson nuclear stress test for further evaluation.  I will see the patient in the office after her nuclear stress test.  I recommended her to start on guideline directed medical therapy for HFrEF including beta-blocker and ACEI/ARB/ARN I.  Patient was reluctant to do that.  I will discuss with her once again once she returns to the office.  Thanks    AK

## 2022-02-14 NOTE — TELEPHONE ENCOUNTER
Pt  requesting to speak with Blanca, she received her echo results via the portal and she would like to discuss further   She can be reached @ [phone]

## 2022-02-14 NOTE — TELEPHONE ENCOUNTER
Spoke with Dr. Moncada to review the echo, he will get in touch with the patient regarding possible nuke stress.

## 2022-02-17 ENCOUNTER — OFFICE VISIT (OUTPATIENT)
Dept: FAMILY MEDICINE | Facility: CLINIC | Age: 77
End: 2022-02-17
Payer: MEDICARE

## 2022-02-17 VITALS
SYSTOLIC BLOOD PRESSURE: 110 MMHG | WEIGHT: 141 LBS | BODY MASS INDEX: 22.13 KG/M2 | DIASTOLIC BLOOD PRESSURE: 68 MMHG | OXYGEN SATURATION: 99 % | RESPIRATION RATE: 16 BRPM | TEMPERATURE: 97.8 F | HEART RATE: 89 BPM | HEIGHT: 67 IN

## 2022-02-17 DIAGNOSIS — G44.89 OTHER HEADACHE SYNDROME: ICD-10-CM

## 2022-02-17 DIAGNOSIS — I50.42 CHRONIC COMBINED SYSTOLIC AND DIASTOLIC CHF (CONGESTIVE HEART FAILURE) (CMS/HCC): ICD-10-CM

## 2022-02-17 DIAGNOSIS — G90.50 RSD (REFLEX SYMPATHETIC DYSTROPHY): Primary | ICD-10-CM

## 2022-02-17 DIAGNOSIS — I71.40 ABDOMINAL AORTIC ANEURYSM (AAA) WITHOUT RUPTURE (CMS/HCC): ICD-10-CM

## 2022-02-17 PROBLEM — I73.00 RAYNAUD'S PHENOMENON: Status: ACTIVE | Noted: 2021-04-16

## 2022-02-17 PROCEDURE — 99214 OFFICE O/P EST MOD 30 MIN: CPT | Performed by: FAMILY MEDICINE

## 2022-02-17 NOTE — PROGRESS NOTES
"      Subjective      Patient ID: Jennifer Sams is a 77 y.o. female.  1945      Patient reports office today for headache and bilateral foot pain.    Patient has been having headaches for several months and has even undergone evaluation with CTA of the head and neck due to an episode where she had sudden vision loss that improved over several hours.  Patient was diagnosed with a TIA and followed up with neurology.  Patient continues with headaches that persist throughout the day.  Typically posterior and radiate from her upper cervical area.    Patient also complains of discomfort in her bilateral feet.  Patient has been diagnosed with reflex sympathetic dystrophy in her feet but also follows with Dr. García for surveillance of AAA and peripheral vascular disease.  Patient has been diagnosed with Raynaud's and even had ABIs performed last year that were indicative of small vessel disease.  Patient continues on aspirin, Plavix, rosuvastatin.    Patient follows with Dr. Castillo, cardiology, for coronary artery disease and CHF.  Patient has refused treatment with beta-blocker or Entresto but has been compliant with her aspirin, Plavix, rosuvastatin.      The following have been reviewed and updated as appropriate in this visit:   Tobacco  Allergies  Meds  Problems  Med Hx  Surg Hx  Fam Hx  Soc   Hx      Review of Systems   All other systems reviewed and are negative.      Objective     Vitals:    02/17/22 0902   BP: 110/68   BP Location: Right upper arm   Patient Position: Sitting   Pulse: 89   Resp: 16   Temp: 36.6 °C (97.8 °F)   TempSrc: Oral   SpO2: 99%   Weight: 64 kg (141 lb)   Height: 1.702 m (5' 7\")     Body mass index is 22.08 kg/m².    Physical Exam  Vitals and nursing note reviewed.   Constitutional:       Appearance: She is well-developed.   Cardiovascular:      Rate and Rhythm: Normal rate.      Pulses:           Dorsalis pedis pulses are 2+ on the right side and 2+ on the left side.        " Posterior tibial pulses are 2+ on the right side and 2+ on the left side.   Pulmonary:      Effort: Pulmonary effort is normal.   Feet:      Comments: Left great toe with small excoriation, no ulceration present.    Distal bilateral feet cool to touch with purple discoloration at baseline.  Skin:     General: Skin is warm and dry.   Neurological:      General: No focal deficit present.      Mental Status: She is alert. Mental status is at baseline.         Assessment/Plan   Diagnoses and all orders for this visit:    RSD (reflex sympathetic dystrophy) (Primary)  Assessment & Plan:  Patient has followed up with pain management in the past, Dr. Aguilar.  Patient will follow up for pain in bilateral feet which is likely due to small vessel disease as indicated by vascular work-up.  Offered patient gabapentin for discomfort and patient declines.  Patient has used CBD oil on her feet with improvement and will discuss this further with pain management.      Chronic combined systolic and diastolic CHF (congestive heart failure) (CMS/[facility])  Assessment & Plan:  Following with cardiology, Dr. Castillo.  Patient is on aspirin Plavix and rosuvastatin.  Patient has discussed beta-blocker therapy as well as Entresto but patient has been resistant to start medications.      Abdominal aortic aneurysm (AAA) without rupture (CMS/[facility])  Assessment & Plan:  Follows with Dr. García yearly for surveillance.      Other headache syndrome  Assessment & Plan:  Patient patient with headaches  ongoing for several months.  Seem to be tension type headaches that occur throughout the day and are frequent.  Patient does follow with neurology but would like a second opinion.  Gave list of neurologists and patient will let us know if she needs a referral.

## 2022-02-17 NOTE — ASSESSMENT & PLAN NOTE
Patient patient with headaches  ongoing for several months.  Seem to be tension type headaches that occur throughout the day and are frequent.  Patient does follow with neurology but would like a second opinion.  Gave list of neurologists and patient will let us know if she needs a referral.

## 2022-02-17 NOTE — PATIENT INSTRUCTIONS
Patient Education     Peripheral Vascular Disease    Peripheral vascular disease (PVD) is a disease of the blood vessels. PVD may also be called peripheral artery disease (PAD) or poor circulation. In most cases, PVD narrows the blood vessels that carry blood from the heart to the rest of the body. This can reduce the supply of blood to the arms, legs, and internal organs, such as the stomach or kidneys. However, PVD most often affects a person's lower legs and feet. Without treatment, PVD tends to get worse.  PVD can lead to acute limb ischemia. This happens when an arm or leg cannot get enough blood all of a sudden. This is a medical emergency.  What are the causes?  Each type of PVD has different causes. The most common cause is a buildup of a fatty substance (plaque) inside your arteries. Small amounts of plaque can break off from the walls of the arteries. The plaque may get stuck in a smaller artery. This blocks blood flow and can cause acute limb ischemia.  Other common causes of PVD include:  · Blood clots that form inside the blood vessels.  · Injuries to blood vessels.  · Diseases that cause irritation and swelling (inflammation) of blood vessels.  · Diseases that cause blood vessel to tighten (spasms).  What increases the risk?  The following factors may make you more likely to develop this condition:  · A family history of PVD.  · Common medical conditions, including:  ? High cholesterol.  ? Diabetes.  ? High blood pressure (hypertension).  ? Heart disease.  ? Past problems with blood clots.  ? Past injury, such as burns or a broken bone.  · Other medical conditions, such as:  ? Buerger's disease. This is caused by swollen or irritated blood vessels in your hands and feet.  ? Some forms of arthritis.  ? Birth defects that affect the arteries in your legs.  ? Kidney disease.  · Using tobacco or nicotine products.  · Not getting enough exercise.  · Being very overweight (obese).  · Being 50 years old or  older.  What are the signs or symptoms?  This condition may cause different symptoms. Your symptoms depend on what part of your body is not getting enough blood. Some common signs and symptoms include:  · Cramps in your lower legs.  · Pain and weakness in your legs when you are active that goes away when you rest.  · Leg pain when at rest.  · Leg numbness, tingling, or weakness.  · Coldness in a leg or foot, especially when compared with the other leg or foot.  · Skin or hair changes. These can include:  ? Hair loss.  ? Shiny skin.  ? Pale or bluish skin.  ? Thick toenails.  · Being unable to get or keep an erection.  · Tiredness (fatigue).  People with PVD are more likely to get open wounds and sores on their toes, feet, or legs. The wounds or sores may take longer than normal to heal.  How is this treated?  Treatment for PVD depends on what caused it, how bad your symptoms are, and your age. Underlying causes need to be treated and controlled. These include long-term (chronic) conditions, such as diabetes, high cholesterol, and high blood pressure. Treatment may include:  · Lifestyle changes, such as:  ? Quitting tobacco use.  ? Getting regular exercise.  ? Having a diet low in fat and cholesterol.  ? Not drinking alcohol.  ? Taking medicines, such as:  § Blood thinners to prevent blood clots.  § Medicines to improve blood flow.  § Medicines to improve your blood cholesterol levels.  · Procedures, such as:  ? Angioplasty. This uses a filled balloon to open a blocked artery and improve blood flow.  ? Stent implant. This inserts a small mesh tube to keep a blocked artery open.  ? Peripheral bypass surgery. This reroutes blood flow around a blocked artery.  ? Surgery to take dead tissue off an infected wound on the affected limb.  ? Amputation. This is taking away the affected limb through surgery. It may be needed if acute limb ischemia occurs and other treatments have not helped.  Follow these instructions at  "home:  Medicines  · Take over-the-counter and prescription medicines only as told by your doctor.  · If you are taking blood thinners:  ? Talk with your doctor before you take any medicines that have aspirin, or NSAIDs, such as ibuprofen, in them.  ? Take your medicine exactly as told by your doctor. Take it at the same time each day.  ? Avoid activities that could hurt or bruise you. Follow instructions about how to prevent falls.  ? Wear a bracelet that says you are taking blood thinners. Or, carry a card that lists what medicines you take.  Lifestyle         · Do not use any products that contain nicotine or tobacco, such as cigarettes, e-cigarettes, and chewing tobacco. If you need help quitting, ask your doctor.  · Talk with your doctor about keeping a healthy weight. If needed, ask about losing weight.  · Eat a diet that is low in fat and cholesterol. If you need help, talk with your doctor.  · Do not drink alcohol.  · Get regular exercise. Ask your doctor about how to stay active.  General instructions  · Take good care of your feet. To do this:  ? Wear shoes that fit well and feel good.  ? Check your feet often for any cuts or sores.  · Keep all follow-up visits as told by your doctor. This is important.  Where to find more information  · Society for Vascular Surgery: vascular.org  · American Heart Association: heart.org  · National Heart, Lung, and Blood [place]: nhlbi.nih.gov  Contact a doctor if:  · You have cramps in your legs when you walk.  · You have leg pain when you rest.  · Your leg or foot feels cold.  · Your skin changes.  · You cannot get or keep an erection.  · You have cuts or sores on your feet that do not heal.  Get help right away if:  · You have sudden changes in the color and feeling of your arms or legs, such as:  ? Your arm or leg turns cold, numb, and blue.  ? Your arm or leg become red, warm, swollen, painful, or numb.  · You have any signs of a stroke. \"BE FAST\" is an easy way to " remember the main warning signs:  ? B - Balance. Signs are dizziness, sudden trouble walking, or loss of balance.  ? E - Eyes. Signs are trouble seeing or a change in how you see.  ? F - Face. Signs are sudden weakness or loss of feeling of the face, or the face or eyelid drooping on one side.  ? A - Arms. Signs are weakness or loss of feeling in an arm. This happens suddenly and usually on one side of the body.  ? S - Speech. Signs are sudden trouble speaking, slurred speech, or trouble understanding what people say.  ? T - Time. Time to call emergency services. Write down what time symptoms started.  · You have other signs of a stroke, such as:  ? A sudden, very bad headache with no known cause.  ? Feeling like you may vomit (nausea).  ? Vomiting.  ? A seizure.  · You have chest pain or trouble breathing.  These symptoms may be an emergency. Do not wait to see if the symptoms will go away. Get medical help right away. Call your local emergency services (911 in the [place] Do not drive yourself to the hospital.  Summary  · Peripheral vascular disease (PVD) is a disease of the blood vessels.  · In most cases, PVD narrows the blood vessels that carry blood from your heart to the rest of your body.  · PVD may cause different symptoms. Your symptoms depend on what part of your body is not getting enough blood.  · Treatment depends on what caused it, how bad your symptoms are, and your age.  This information is not intended to replace advice given to you by your health care provider. Make sure you discuss any questions you have with your health care provider.  Document Revised: 09/28/2020 Document Reviewed: 09/29/2020  Elsevier Patient Education © 2021 [employer] Inc.

## 2022-02-17 NOTE — ASSESSMENT & PLAN NOTE
Following with cardiology, Dr. Castillo.  Patient is on aspirin Plavix and rosuvastatin.  Patient has discussed beta-blocker therapy as well as Entresto but patient has been resistant to start medications.

## 2022-02-17 NOTE — ASSESSMENT & PLAN NOTE
Patient has followed up with pain management in the past, Dr. Aguilar.  Patient will follow up for pain in bilateral feet which is likely due to small vessel disease as indicated by vascular work-up.  Offered patient gabapentin for discomfort and patient declines.  Patient has used CBD oil on her feet with improvement and will discuss this further with pain management.

## 2022-02-21 RX ORDER — CLOPIDOGREL BISULFATE 75 MG/1
TABLET ORAL
Qty: 90 TABLET | Refills: 3 | Status: SHIPPED | OUTPATIENT
Start: 2022-02-21 | End: 2022-03-08 | Stop reason: SINTOL

## 2022-02-21 NOTE — TELEPHONE ENCOUNTER
A refill request was received from [facility] for Clopidogrel 75 mg daily.     Last OV w/ Dr. Salter was on 3/18/21 w/ instructions to return PRN. No upcoming appts scheduled.     Dr. Moncada follows pt's CAD.     Interventional team: Are you able to refill this pt's Plavix if appropriate pls? Pt will only be following up w/ Dr. Salter PRN now. TY!

## 2022-03-03 ENCOUNTER — TELEPHONE (OUTPATIENT)
Dept: SCHEDULING | Facility: CLINIC | Age: 77
End: 2022-03-03
Payer: MEDICARE

## 2022-03-03 NOTE — TELEPHONE ENCOUNTER
I called and spoke with patient, she has been dealing with awful nosebleeds for the last few months.  She has been cauterized by ENT a few times has been seen in the ER because the blood is so heavy.  She has thrown up clots because of it and is just very distressed about the bleeding.  I advised that she can stop her Plavix for up to 5 days, I will talk with Dr. Moncada for long-term plan.  She was cathed 2/2021, no interventions.  Last FRANK was 2019.  Will discuss with Dr. Moncada and call patient back.

## 2022-03-03 NOTE — TELEPHONE ENCOUNTER
Pt was in Er at [place] [facility] with massive nosebleeds.  Needs to speak with Dr bronson or Blanca about coming off blood thinner immediatley and stay on aspirin for 7 days  per ENT dr Dr Malick Lunsford at [place] [phone]    Pt can be reached at [phone]

## 2022-03-07 ENCOUNTER — TELEPHONE (OUTPATIENT)
Dept: CARDIOLOGY | Facility: CLINIC | Age: 77
End: 2022-03-07
Payer: MEDICARE

## 2022-03-07 NOTE — TELEPHONE ENCOUNTER
I called and spoke with patient, she has not continued bleeding.  She would like to discuss with Dr. Antwan Teran moving forward, does she need to continue long term?

## 2022-03-09 ENCOUNTER — TELEPHONE (OUTPATIENT)
Dept: CARDIOLOGY | Facility: HOSPITAL | Age: 77
End: 2022-03-09
Payer: MEDICARE

## 2022-03-11 ENCOUNTER — HOSPITAL ENCOUNTER (OUTPATIENT)
Dept: CARDIOLOGY | Facility: HOSPITAL | Age: 77
Setting detail: NUCLEAR MEDICINE
Discharge: HOME | End: 2022-03-11
Attending: INTERNAL MEDICINE
Payer: MEDICARE

## 2022-03-11 VITALS
WEIGHT: 138 LBS | HEIGHT: 67 IN | HEART RATE: 65 BPM | SYSTOLIC BLOOD PRESSURE: 148 MMHG | BODY MASS INDEX: 21.66 KG/M2 | OXYGEN SATURATION: 99 % | DIASTOLIC BLOOD PRESSURE: 70 MMHG

## 2022-03-11 VITALS — HEART RATE: 74 BPM | SYSTOLIC BLOOD PRESSURE: 137 MMHG | DIASTOLIC BLOOD PRESSURE: 66 MMHG

## 2022-03-11 DIAGNOSIS — I25.10 CORONARY ARTERY DISEASE INVOLVING NATIVE CORONARY ARTERY OF NATIVE HEART, UNSPECIFIED WHETHER ANGINA PRESENT: ICD-10-CM

## 2022-03-11 DIAGNOSIS — R93.1 ABNORMAL ECHOCARDIOGRAM: ICD-10-CM

## 2022-03-11 LAB
CV NM TETROFOSMIN REST DOSE: 9.6 MCI
CV NM TETROFOSMIN STRESS DOSE: 32.3 MCI
STRESS BASELINE BP: NORMAL MMHG
STRESS BASELINE HR: 61 BPM
STRESS O2 SAT REST: 99 %
STRESS PERCENT HR: 66 %
STRESS POST PEAK BP: NORMAL MMHG
STRESS POST PEAK HR: 94 BPM
STRESS TARGET HR: 122 BPM

## 2022-03-11 PROCEDURE — 93016 CV STRESS TEST SUPVJ ONLY: CPT | Performed by: INTERNAL MEDICINE

## 2022-03-11 PROCEDURE — 78452 HT MUSCLE IMAGE SPECT MULT: CPT

## 2022-03-11 PROCEDURE — A9502 TC99M TETROFOSMIN: HCPCS | Performed by: INTERNAL MEDICINE

## 2022-03-11 PROCEDURE — 78452 HT MUSCLE IMAGE SPECT MULT: CPT | Mod: 26 | Performed by: INTERNAL MEDICINE

## 2022-03-11 PROCEDURE — 93018 CV STRESS TEST I&R ONLY: CPT | Performed by: INTERNAL MEDICINE

## 2022-03-11 PROCEDURE — 63600000 HC DRUGS/DETAIL CODE: Performed by: INTERNAL MEDICINE

## 2022-03-11 RX ORDER — REGADENOSON 0.08 MG/ML
0.4 INJECTION, SOLUTION INTRAVENOUS ONCE
Status: COMPLETED | OUTPATIENT
Start: 2022-03-11 | End: 2022-03-11

## 2022-03-11 RX ADMIN — TETROFOSMIN 9.6 MILLICURIE: 1.38 INJECTION, POWDER, LYOPHILIZED, FOR SOLUTION INTRAVENOUS at 13:05

## 2022-03-11 RX ADMIN — REGADENOSON 0.4 MG: 0.08 INJECTION, SOLUTION INTRAVENOUS at 14:54

## 2022-03-11 RX ADMIN — TETROFOSMIN 32.3 MILLICURIE: 1.38 INJECTION, POWDER, LYOPHILIZED, FOR SOLUTION INTRAVENOUS at 14:55

## 2022-04-04 ENCOUNTER — TELEPHONE (OUTPATIENT)
Dept: CARDIOLOGY | Facility: CLINIC | Age: 77
End: 2022-04-04
Payer: MEDICARE

## 2022-04-04 NOTE — TELEPHONE ENCOUNTER
Thank you Devendra    I discussed with Dr Moncada. I called and spoke with patient, she will come in at 430 to be seen on Wednesday.

## 2022-04-04 NOTE — TELEPHONE ENCOUNTER
Pt of Dr. Araseli LARKIN on Wednesday     Calling to report that she has been having increased SOB and dizziness for last few weeks. Has been worsening. Had near syncopal episode on Wednesday while out to eat. Had blurry vision, unable to hear/understand dinner , lasted several minutes in the middle of dinner. She then needed assistance to bathroom where she had diarrhea. States she usual has episode of diarrhea after significant dizziness.     Reports increased MARIEE, she was walking 3 blocks daily prior to bronchitis in Jan, now does not walk alone and struggles with 1 block. Dizziness occurs randomly, but always with activity, most notably with bending. Resolves with rest.     Reports she is well hydrated drinks 4 or 5 20 oz bryson daily. No etoh. Rare caffeine, states a few sips of a coke helps dizziness.     BP usually 120/70s.     +HA daily up left side of neck and into head. Was suggested it is related to sleeping position.     Plavix stopped 3/3 d/t nosebleed.     Pt had recent stress test. Concerned lead placement interrupted her pacemaker.     Recent labs in [facility] [employer].     If possible to pull data from device last near syncope episode around 6:15-6:30 this past Wed.     Please advise if other work up needed prior to appointment.     Pt can be reached at [phone]

## 2022-04-04 NOTE — TELEPHONE ENCOUNTER
[employer] PM      Called and spoke to pt.  Walked her through a manual DL which was just received.      Review of data shows no AHR or VHR.      AF [place] is 0%.      Pt is waiting for a return call.          Clinical Status:            Current EGM:

## 2022-04-04 NOTE — TELEPHONE ENCOUNTER
"I called and spoke with patient.  She has not been feeling well for few weeks.  She has been feeling fluttering in her chest, and on and off dizzy spells with movement.  She went out to dinner on Wednesday, around 530.  She admits she was not feeling the greatest before going to dinner, but thought she could still enjoy it.  She was sitting down, and felt a change in her vision and noticed the lights started to look different.  She was sitting in a daniels, she leaned against the wall and elevated her legs on the bench seat.  She did not lose consciousness, but did have an episode of diarrhea after the near syncopal feeling had passed.  She did not go to the ER, \"I did not think I needed to.\"      She drinks approximately 80 to 100 ounces of water a day.  She has refused beta-blocker treatment in the past.  She was diagnosed in January with bronchitis, which lasted about 4 weeks.  She feels she has not felt the same since this bronchitis diagnosis.  I asked her how she followed up with her primary care about this, she states that she has seen him in office since that diagnosis, but is looking for a different PCP.    Patient follows with Dr. Carolina, last seen in September 2021.  We will forward to EP and device team, can you please look for any arrhythmias last Wednesday March 30th after 530.  "

## 2022-04-06 ENCOUNTER — OFFICE VISIT (OUTPATIENT)
Dept: CARDIOLOGY | Facility: CLINIC | Age: 77
End: 2022-04-06
Payer: MEDICARE

## 2022-04-06 VITALS
HEART RATE: 72 BPM | OXYGEN SATURATION: 98 % | HEIGHT: 67 IN | RESPIRATION RATE: 18 BRPM | SYSTOLIC BLOOD PRESSURE: 122 MMHG | WEIGHT: 141.4 LBS | DIASTOLIC BLOOD PRESSURE: 68 MMHG | BODY MASS INDEX: 22.19 KG/M2

## 2022-04-06 DIAGNOSIS — R06.09 DOE (DYSPNEA ON EXERTION): ICD-10-CM

## 2022-04-06 DIAGNOSIS — I25.10 CORONARY ARTERY DISEASE INVOLVING NATIVE CORONARY ARTERY OF NATIVE HEART WITHOUT ANGINA PECTORIS: ICD-10-CM

## 2022-04-06 DIAGNOSIS — Z95.0 PACEMAKER: ICD-10-CM

## 2022-04-06 DIAGNOSIS — I25.10 CORONARY ARTERY DISEASE INVOLVING NATIVE CORONARY ARTERY OF NATIVE HEART, UNSPECIFIED WHETHER ANGINA PRESENT: Primary | ICD-10-CM

## 2022-04-06 DIAGNOSIS — I25.5 ISCHEMIC CARDIOMYOPATHY: ICD-10-CM

## 2022-04-06 PROCEDURE — 93000 ELECTROCARDIOGRAM COMPLETE: CPT | Performed by: INTERNAL MEDICINE

## 2022-04-06 PROCEDURE — 99214 OFFICE O/P EST MOD 30 MIN: CPT | Performed by: INTERNAL MEDICINE

## 2022-04-06 NOTE — PROGRESS NOTES
Noé Moncada MD  [place] [facility] [facility]  D.Card ([facility])  Interventional Cardiology       Reason for visit : Follow up  HPI   Jennifer Sams is a 77 y.o. female who presents to the office for cardiovascular follow up.  She is accompanied to today's visit by her .   She continues to have exertional shortness of breath.  She had cough and productive sputum over the last couple of months and was treated with amoxicillin by one of her primary care physician.  Symptoms of cough and sputum improved but shortness of breath has not returned to baseline.  She denies any chest pain.  No orthopnea, PND, palpitation or syncopal symptoms.  She had 1 episode of lightheadedness in the recent past.  She was in the restaurant having her dinner and felt lightheaded so that she had to lay flat, this was followed by profuse diarrhea.  He did not have any associated chest pain, shortness of breath, palpitation or diaphoresis at that time.  However, she gets intermittent palpitation at times which is short-lived and not associated with presyncope.  She is going to schedule an appointment with Dr. Guzman Carolina for follow-up and pacemaker interrogation.  She had a regadenoson nuclear stress test on 11 March 2022 to which was negative for ischemia.  She is a schedule for cataract surgery later this month and needs cardiac clearance.         Problem List:  · Coronary artery disease, status post  PCI of the left anterior descending artery, right coronary and left circumflex artery in April 2019.  Cardiac catheterization in February 2021 showed patent stents.  Ostial left circumflex artery intermediate lesion is IFR negative.  Regadenoson nuclear stress test on 3/11/2022 was negative for ischemia  · Ischemic cardiomyopathy.  EF 40 to 45% in February 2022  · Hypertension  · Dyslipidemia  · 2:1 AV block; status post dual-chamber pacemaker 11/18/19:  · AAA (abdominal aortic aneurysm)  · Epistaxis  · GERD (gastroesophageal  reflux disease)   · Glaucoma    · Hiatal hernia,   · History of hip replacement.Osteoarthritis of multiple joints  · Osteoporosis  · History of rheumatic fever as a child ,  · Raynaud's disease   · RSD (reflex sympathetic dystrophy)        Current Outpatient Medications   Medication Sig   • aspirin (ASPIR-81) 81 mg enteric coated tablet Take 1 tablet (81 mg total) by mouth daily.   • nitroglycerin (NITROSTAT) 0.4 mg SL tablet Place 1 tablet (0.4 mg total) under the tongue every 5 (five) minutes as needed for chest pain.   • pantoprazole (PROTONIX) 40 mg EC tablet Take 40 mg by mouth daily.   • rosuvastatin (CRESTOR) 20 mg tablet Take 1 tablet (20 mg total) by mouth once daily.     Allergies:  Ace inhibitors, Atorvastatin, Brilinta [ticagrelor], Codeine, Dilaudid [hydromorphone], Morphine sulfate, Onion, and Oxycodone       Review of Systems  Constitution: Negative for chills, fever,.  HENT: Negative for congestion, hearing loss and nosebleeds.    Eyes: Negative for visual disturbance.   Cardiovascular: Negative for chest pain, no orthopnea, PND, palpitation, presyncope, syncope, claudication.  Positive for shortness of breath  Respiratory: Positive for cough and shortness of breath  Hematologic/Lymphatic: Does not bruise/bleed easily.   Skin: Negative for rash.   Musculoskeletal: Negative for muscle weakness and myalgias.  Positive joint stiffness.  Gastrointestinal: Negative for abdominal pain, nausea, vomiting, anorexia, hematemesis, hematochezia, melena.   Genitourinary: Negative for dysuria, frequency and nocturia.   Neurological: Negative for focal weakness, headaches, light-headedness, numbness and paresthesias.  Positive for dizziness       Objective   Vitals:    04/06/22 1626   BP: 122/68   Pulse: 72   Resp: 18   SpO2: 98%       Wt Readings from Last 3 Encounters:   01/19/22 63 kg (139 lb)   12/13/21 64.4 kg (142 lb)   11/17/21 64.4 kg (142 lb)     Physical Exam  Constitutional: No acute distress. .  HENT:  Normocephalic and atraumatic. Moist mucous membranes.  Eyes: EOM are normal.   Neck: No JVD present.  No carotid bruit.  Cardiovascular: Normal rate and regular rhythm.  No murmur, gallop or rub.  Pulmonary/Chest: Normal effort and air entry. No wheezing, rales, ronchi.  Abdominal: Normal bowel sounds. Soft, non tender.   Musculoskeletal: No bony deformity.  Extremities: No edema.   Neurological: Alert and oriented to person, place, and time.   Skin: Skin is warm and dry. No rash.  Psychiatric: Normal mood, affect and behavior.        Labs:   Lab Results   Component Value Date    WBC 5.9 12/10/2021    HGB 13.6 12/10/2021    HCT 40.6 12/10/2021     12/10/2021    CHOL 161 12/10/2021    TRIG 93 12/10/2021    HDL 67 12/10/2021    LDLCALC 76 12/10/2021    NONHDLCALC 94 02/08/2021    ALT 9 12/10/2021    AST 21 12/10/2021     12/10/2021    K 3.9 12/10/2021     12/10/2021    CREATININE 0.84 12/10/2021    BUN 16 12/10/2021    CO2 27 12/10/2021    TSH 1.86 01/17/2020    INR 1.0 11/18/2019       ECG :  Atrial triggered ventricular paced rhythm    Cardiac Imaging    TRANSTHORACIC ECHO: 2/9/2022  · Left Ventricle: Normal ventricle size. Mild concentric left ventricular hypertrophy. Mild-to-moderately decreased systolic function. Estimated EF 40- 45%. LV SVI low at 28 mL/m² per beat. Basal and mid inferolateral akinesis, mid and apical anteroseptal akinesisGrade I diastolic dysfunction. Diastolic inflow pattern consistent with impaired relaxation. Normal left atrial pressure.  · Tricuspid aortic valve. Sclerotic aortic valve leaflets.  · Aorta: Sinuses of Valsalva normal-sized.  · Normal leaflet structure of the mitral valve.  · Normal-sized LA. Doppler flow of pulmonary veins not obtained.  · Normal-sized RV. Mildly reduced RV systolic function. Pacemaker/ICD lead present in the RV. Normal RV wall thickness. Mid free wall akinetic.  · Normal-sized RA. Pacemaker wire present in the RA.  · Tricuspid Valve:  Normal structure. Trace regurgitation. Estimated RVSP = 27 mmHg.  · Pulmonic valve not well visualized.  · IVC collapses <50% during inspiration.  · Evidence of a prominent pericardial fat pad.     Limited study.  Tissue velocities not obtained.  Apical 2 chamber view not available.  Patient declined Definity.             Assessment/Plan   Coronary artery disease.   He has a history of a non-STEMI in April 2019, status post PCI of the LAD, left for circumflex and right coronary artery in April 2019.  Previous cardiac catheterization in November 2019 and February 2021 showed patent stents with no new lesions.  Most recent regadenoson stress test on 3/11/2022 was negative for ischemia.    She has no recurrence of anginal symptoms.  Her recent ischemic work-up is reassuring.  I I recommend continued medical management and aggressive risk factor modifications.  .    Ischemic cardiomyopathy  Echocardiogram in February 2022 that showed EF 40-45%.  She continues to have exertional shortness of breath.  She had a recent cough and productive sputum treated with antibiotic by one of her primary care physician.  She continues to have chronic cough.  It is unclear whether her shortness of breath is related to her ischemic cardiomyopathy. Right heart catheterization with normal right and left-sided filling pressures in February 2021 when evaluated for shortness of breath.   I discussed and recommended guideline directed medical therapy for HFrEF in multiple locations including during this office visit.  Beta-blocker and ARB/Entresto therapy has been offered, but declined related to fear of side effects.  She would make an appointment with Dr. Nayely Tidwell for her ongoing chronic cough with sputum and shortness of breath.  Apparently she has hiatal hernia and concern for aspiration.  For ischemic cardiomyopathy she will follow with Dr. Jermaine Salter.    Recent transient visual loss.  She has not had any recurrent episodes.. She  was seen by an ophthalmologist and also by neurology Dr. Peraza. Etiology remains unrevealing.     2:1 AV block; status post dual-chamber pacemaker 11/18/19:  Follows with Dr. Guzman Carolina.      Dyslipidemia  Last blood work shows satisfactory lipid profile.   Continue current dose of rosuvastatin.    Cardiac clearance  She is scheduled for cataract surgery later this month.  She is at acceptable cardiac risk for cataract surgery.    Follow up with Jermaine Salter.  Follow-up with me as needed.      I thank you for allowing me to participate in the care of your patient.  If I can furnish you with any further details, please do not hesitate to contact me.      Noé Moncada MD    This letter was generated using speech recognition software. Please excuse any typographical errors.

## 2022-04-06 NOTE — LETTER
Eliu Negrete MD  [address] [address]    Patient: Jennifer Sams  YOB: 1945  Date of Visit: 4/6/2022      Dear Dr. Negrete:    Thank you for referring Jennifer Sams to me for evaluation. Below are my notes for this consultation.    If you have questions, please do not hesitate to call me. I look forward to following your patient along with you.         Sincerely,        Noé Moncada MD        CC: MD Noé Gallagher MD  [facility], [facility] [place], [facility]  D.Card ([facility])  Interventional Cardiology       Reason for visit : Follow up  HPI   Jennifer Sams is a 77 y.o. female who presents to the office for cardiovascular follow up.  She is accompanied to today's visit by her .   She continues to have exertional shortness of breath.  She had cough and productive sputum over the last couple of months and was treated with amoxicillin by one of her primary care physician.  Symptoms of cough and sputum improved but shortness of breath has not returned to baseline.  She denies any chest pain.  No orthopnea, PND, palpitation or syncopal symptoms.  She had 1 episode of lightheadedness in the recent past.  She was in the restaurant having her dinner and felt lightheaded so that she had to lay flat, this was followed by profuse diarrhea.  He did not have any associated chest pain, shortness of breath, palpitation or diaphoresis at that time.  However, she gets intermittent palpitation at times which is short-lived and not associated with presyncope.  She is going to schedule an appointment with Dr. Guzman Carolina for follow-up and pacemaker interrogation.  She had a regadenoson nuclear stress test on 11 March 2022 to which was negative for ischemia.  She is a schedule for cataract surgery later this month and needs cardiac clearance.         Problem List:  · Coronary artery disease, status post  PCI of the left anterior descending artery, right  coronary and left circumflex artery in April 2019.  Cardiac catheterization in February 2021 showed patent stents.  Ostial left circumflex artery intermediate lesion is IFR negative.  Regadenoson nuclear stress test on 3/11/2022 was negative for ischemia  · Ischemic cardiomyopathy.  EF 40 to 45% in February 2022  · Hypertension  · Dyslipidemia  · 2:1 AV block; status post dual-chamber pacemaker 11/18/19:  · AAA (abdominal aortic aneurysm)  · Epistaxis  · GERD (gastroesophageal reflux disease)   · Glaucoma    · Hiatal hernia,   · History of hip replacement.Osteoarthritis of multiple joints  · Osteoporosis  · History of rheumatic fever as a child ,  · Raynaud's disease   · RSD (reflex sympathetic dystrophy)        Current Outpatient Medications   Medication Sig   • aspirin (ASPIR-81) 81 mg enteric coated tablet Take 1 tablet (81 mg total) by mouth daily.   • nitroglycerin (NITROSTAT) 0.4 mg SL tablet Place 1 tablet (0.4 mg total) under the tongue every 5 (five) minutes as needed for chest pain.   • pantoprazole (PROTONIX) 40 mg EC tablet Take 40 mg by mouth daily.   • rosuvastatin (CRESTOR) 20 mg tablet Take 1 tablet (20 mg total) by mouth once daily.     Allergies:  Ace inhibitors, Atorvastatin, Brilinta [ticagrelor], Codeine, Dilaudid [hydromorphone], Morphine sulfate, Onion, and Oxycodone       Review of Systems  Constitution: Negative for chills, fever,.  HENT: Negative for congestion, hearing loss and nosebleeds.    Eyes: Negative for visual disturbance.   Cardiovascular: Negative for chest pain, no orthopnea, PND, palpitation, presyncope, syncope, claudication.  Positive for shortness of breath  Respiratory: Positive for cough and shortness of breath  Hematologic/Lymphatic: Does not bruise/bleed easily.   Skin: Negative for rash.   Musculoskeletal: Negative for muscle weakness and myalgias.  Positive joint stiffness.  Gastrointestinal: Negative for abdominal pain, nausea, vomiting, anorexia, hematemesis,  hematochezia, melena.   Genitourinary: Negative for dysuria, frequency and nocturia.   Neurological: Negative for focal weakness, headaches, light-headedness, numbness and paresthesias.  Positive for dizziness       Objective   Vitals:    04/06/22 1626   BP: 122/68   Pulse: 72   Resp: 18   SpO2: 98%       Wt Readings from Last 3 Encounters:   01/19/22 63 kg (139 lb)   12/13/21 64.4 kg (142 lb)   11/17/21 64.4 kg (142 lb)     Physical Exam  Constitutional: No acute distress. .  HENT: Normocephalic and atraumatic. Moist mucous membranes.  Eyes: EOM are normal.   Neck: No JVD present.  No carotid bruit.  Cardiovascular: Normal rate and regular rhythm.  No murmur, gallop or rub.  Pulmonary/Chest: Normal effort and air entry. No wheezing, rales, ronchi.  Abdominal: Normal bowel sounds. Soft, non tender.   Musculoskeletal: No bony deformity.  Extremities: No edema.   Neurological: Alert and oriented to person, place, and time.   Skin: Skin is warm and dry. No rash.  Psychiatric: Normal mood, affect and behavior.        Labs:   Lab Results   Component Value Date    WBC 5.9 12/10/2021    HGB 13.6 12/10/2021    HCT 40.6 12/10/2021     12/10/2021    CHOL 161 12/10/2021    TRIG 93 12/10/2021    HDL 67 12/10/2021    LDLCALC 76 12/10/2021    NONHDLCALC 94 02/08/2021    ALT 9 12/10/2021    AST 21 12/10/2021     12/10/2021    K 3.9 12/10/2021     12/10/2021    CREATININE 0.84 12/10/2021    BUN 16 12/10/2021    CO2 27 12/10/2021    TSH 1.86 01/17/2020    INR 1.0 11/18/2019       ECG :  Atrial triggered ventricular paced rhythm    Cardiac Imaging    TRANSTHORACIC ECHO: 2/9/2022  · Left Ventricle: Normal ventricle size. Mild concentric left ventricular hypertrophy. Mild-to-moderately decreased systolic function. Estimated EF 40- 45%. LV SVI low at 28 mL/m² per beat. Basal and mid inferolateral akinesis, mid and apical anteroseptal akinesisGrade I diastolic dysfunction. Diastolic inflow pattern consistent with  impaired relaxation. Normal left atrial pressure.  · Tricuspid aortic valve. Sclerotic aortic valve leaflets.  · Aorta: Sinuses of Valsalva normal-sized.  · Normal leaflet structure of the mitral valve.  · Normal-sized LA. Doppler flow of pulmonary veins not obtained.  · Normal-sized RV. Mildly reduced RV systolic function. Pacemaker/ICD lead present in the RV. Normal RV wall thickness. Mid free wall akinetic.  · Normal-sized RA. Pacemaker wire present in the RA.  · Tricuspid Valve: Normal structure. Trace regurgitation. Estimated RVSP = 27 mmHg.  · Pulmonic valve not well visualized.  · IVC collapses <50% during inspiration.  · Evidence of a prominent pericardial fat pad.     Limited study.  Tissue velocities not obtained.  Apical 2 chamber view not available.  Patient declined Definity.             Assessment/Plan   Coronary artery disease.   He has a history of a non-STEMI in April 2019, status post PCI of the LAD, left for circumflex and right coronary artery in April 2019.  Previous cardiac catheterization in November 2019 and February 2021 showed patent stents with no new lesions.  Most recent regadenoson stress test on 3/11/2022 was negative for ischemia.    She has no recurrence of anginal symptoms.  Her recent ischemic work-up is reassuring.  I I recommend continued medical management and aggressive risk factor modifications.  .    Ischemic cardiomyopathy  Echocardiogram in February 2022 that showed EF 40-45%.  She continues to have exertional shortness of breath.  She had a recent cough and productive sputum treated with antibiotic by one of her primary care physician.  She continues to have chronic cough.  It is unclear whether her shortness of breath is related to her ischemic cardiomyopathy. Right heart catheterization with normal right and left-sided filling pressures in February 2021 when evaluated for shortness of breath.   I discussed and recommended guideline directed medical therapy for HFrEF in  multiple locations including during this office visit.  Beta-blocker and ARB/Entresto therapy has been offered, but declined related to fear of side effects.  She would make an appointment with Dr. Nayely Tidwell for her ongoing chronic cough with sputum and shortness of breath.  Apparently she has hiatal hernia and concern for aspiration.  For ischemic cardiomyopathy she will follow with Dr. Jermaine Salter.    Recent transient visual loss.  She has not had any recurrent episodes.. She was seen by an ophthalmologist and also by neurology Dr. Peraza. Etiology remains unrevealing.     2:1 AV block; status post dual-chamber pacemaker 11/18/19:  Follows with Dr. Guzman Carolina.      Dyslipidemia  Last blood work shows satisfactory lipid profile.   Continue current dose of rosuvastatin.    Cardiac clearance  She is scheduled for cataract surgery later this month.  She is at acceptable cardiac risk for cataract surgery.    Follow up with Jermaine Salter.  Follow-up with me as needed.      I thank you for allowing me to participate in the care of your patient.  If I can furnish you with any further details, please do not hesitate to contact me.      Noé Moncada MD    This letter was generated using speech recognition software. Please excuse any typographical errors.

## 2022-04-07 ENCOUNTER — TELEPHONE (OUTPATIENT)
Dept: CARDIOLOGY | Facility: CLINIC | Age: 77
End: 2022-04-07
Payer: MEDICARE

## 2022-04-07 NOTE — TELEPHONE ENCOUNTER
Pt called to schedule ov .    No opening until mid may with Dr Carolina.    Pt also needs an appt with dr. mobley .    No appts to offer for dr. mobley    Pt can be reached at [phone]

## 2022-04-07 NOTE — TELEPHONE ENCOUNTER
[facility] [place] call back and schedule ov appt with Dr. Craolina, he currently has slots at [facility] on 04-26-22 or pt can see NP at [facility].

## 2022-04-09 PROBLEM — I25.10 CORONARY ARTERY DISEASE INVOLVING NATIVE CORONARY ARTERY OF NATIVE HEART WITHOUT ANGINA PECTORIS: Status: RESOLVED | Noted: 2021-02-24 | Resolved: 2022-04-09

## 2022-04-09 PROBLEM — I25.10 CORONARY ARTERY DISEASE INVOLVING NATIVE CORONARY ARTERY OF NATIVE HEART WITHOUT ANGINA PECTORIS: Status: ACTIVE | Noted: 2022-04-09

## 2022-04-11 ENCOUNTER — TELEPHONE (OUTPATIENT)
Dept: CARDIOLOGY | Facility: CLINIC | Age: 77
End: 2022-04-11

## 2022-04-11 NOTE — TELEPHONE ENCOUNTER
I agree, she has not been seen in a long time. I did mention SD was off, Patients should always be told that--Eugenio Miller I know you tell patients this, so thank you.    Lets move her off and put her on waiting list and let her know we will get her in as soon as we can.

## 2022-04-11 NOTE — TELEPHONE ENCOUNTER
Angela can you move her to 4-21 at 4 pm. No need to call her.    If someone drops off that day we can move her up. All follow-ups that day, works out fine, thanks!

## 2022-04-12 ENCOUNTER — TELEPHONE (OUTPATIENT)
Dept: SCHEDULING | Facility: CLINIC | Age: 77
End: 2022-04-12
Payer: MEDICARE

## 2022-04-12 NOTE — TELEPHONE ENCOUNTER
Dominic, I spoke with Ruth at Dr. Perez's office.  Can you please fax Dr. Moncada's office note from 4-4.  It was sent to Dr. Perez directly.  Ruth requests a copy be faxed to the office as well at [phone].    Thank you. I spoke with patient, I let her know we faxed paperwork

## 2022-04-12 NOTE — TELEPHONE ENCOUNTER
Patient is calling to inquire about paperwork she left with Dr. Moncada to be completed  for upcoming surgery.  She says she has waited 7 months for the surgery and if they don't receive the surgical clearance today they will cancel - she says they need the paperwork completed she's unsure if they will accept a CCV letter.    Surgery date: Monday, 4/18  Surgery: cataract surgery L  Surgeon: Rakan Perez MD  Phone: [phone]  Fax: [phone]    Patient's OV was 4/6 with Dr. Moncada  - patient would like a call back at: [phone]

## 2022-04-13 ENCOUNTER — TELEPHONE (OUTPATIENT)
Dept: FAMILY MEDICINE | Facility: CLINIC | Age: 77
End: 2022-04-13
Payer: MEDICARE

## 2022-04-13 NOTE — TELEPHONE ENCOUNTER
"Calling to get pt's pre-op clearance, most recent labs and EKG. Pt's procedure is Monday 4/18    Stated the clearance form needs to specifically say \"Patient is cleared\" or it won't be accepted.    Fax to [phone]  "

## 2022-04-14 ENCOUNTER — TELEPHONE (OUTPATIENT)
Dept: SCHEDULING | Facility: CLINIC | Age: 77
End: 2022-04-14
Payer: MEDICARE

## 2022-04-14 NOTE — TELEPHONE ENCOUNTER
Medical Records Request     Name of caller: chad      Who’s requesting copy of medical records? [facility]    Records being requested: ekg [id number]    Fax number: [phone]    Best contact number:[phone]

## 2022-04-21 ENCOUNTER — OFFICE VISIT (OUTPATIENT)
Dept: CARDIOLOGY | Facility: CLINIC | Age: 77
End: 2022-04-21
Payer: MEDICARE

## 2022-04-21 VITALS
BODY MASS INDEX: 22.52 KG/M2 | OXYGEN SATURATION: 98 % | DIASTOLIC BLOOD PRESSURE: 72 MMHG | SYSTOLIC BLOOD PRESSURE: 118 MMHG | HEIGHT: 67 IN | HEART RATE: 66 BPM | WEIGHT: 143.5 LBS

## 2022-04-21 DIAGNOSIS — I25.10 CORONARY ARTERY DISEASE INVOLVING NATIVE HEART WITHOUT ANGINA PECTORIS, UNSPECIFIED VESSEL OR LESION TYPE: ICD-10-CM

## 2022-04-21 DIAGNOSIS — Z95.0 PACEMAKER: Primary | ICD-10-CM

## 2022-04-21 DIAGNOSIS — I50.42 CHRONIC COMBINED SYSTOLIC AND DIASTOLIC HEART FAILURE (CMS/HCC): ICD-10-CM

## 2022-04-21 DIAGNOSIS — I71.40 ABDOMINAL AORTIC ANEURYSM (AAA) WITHOUT RUPTURE (CMS/HCC): ICD-10-CM

## 2022-04-21 DIAGNOSIS — I44.1 MOBITZ TYPE 2 SECOND DEGREE ATRIOVENTRICULAR BLOCK: ICD-10-CM

## 2022-04-21 DIAGNOSIS — R06.09 DYSPNEA ON EXERTION: Primary | ICD-10-CM

## 2022-04-21 DIAGNOSIS — E78.2 MIXED HYPERLIPIDEMIA: ICD-10-CM

## 2022-04-21 DIAGNOSIS — R42 DIZZINESS: ICD-10-CM

## 2022-04-21 DIAGNOSIS — I25.10 CORONARY ARTERY DISEASE INVOLVING NATIVE CORONARY ARTERY OF NATIVE HEART WITHOUT ANGINA PECTORIS: ICD-10-CM

## 2022-04-21 PROCEDURE — 93000 ELECTROCARDIOGRAM COMPLETE: CPT | Performed by: INTERNAL MEDICINE

## 2022-04-21 PROCEDURE — 99213 OFFICE O/P EST LOW 20 MIN: CPT | Mod: 25

## 2022-04-21 PROCEDURE — 99215 OFFICE O/P EST HI 40 MIN: CPT | Performed by: INTERNAL MEDICINE

## 2022-04-21 RX ORDER — KETOROLAC TROMETHAMINE 5 MG/ML
1 SOLUTION OPHTHALMIC 4 TIMES DAILY
COMMUNITY
Start: 2022-04-05 | End: 2023-03-14

## 2022-04-21 RX ORDER — VALSARTAN 40 MG/1
40 TABLET ORAL DAILY
Qty: 90 TABLET | Refills: 3 | Status: SHIPPED | OUTPATIENT
Start: 2022-04-21 | End: 2023-03-14

## 2022-04-21 RX ORDER — PREDNISOLONE ACETATE 10 MG/ML
1 SUSPENSION/ DROPS OPHTHALMIC 4 TIMES DAILY
COMMUNITY
Start: 2022-04-05 | End: 2023-03-14

## 2022-04-21 ASSESSMENT — ENCOUNTER SYMPTOMS
PSYCHIATRIC NEGATIVE: 1
ENDOCRINE NEGATIVE: 1
GASTROINTESTINAL NEGATIVE: 1
EYES NEGATIVE: 1
NEUROLOGICAL NEGATIVE: 1
CARDIOVASCULAR NEGATIVE: 1
HEMATOLOGIC/LYMPHATIC NEGATIVE: 1
MUSCULOSKELETAL NEGATIVE: 1
ALLERGIC/IMMUNOLOGIC NEGATIVE: 1
CONSTITUTIONAL NEGATIVE: 1
RESPIRATORY NEGATIVE: 1

## 2022-04-21 NOTE — LETTER
April 21, 2022     Eliu Negrete MD  [address] [address]    Patient: Jennifer Sams  YOB: 1945  Date of Visit: 4/21/2022      Dear Dr. Negrete:    Thank you for referring Jennifer Sams to me for evaluation. Below are my notes for this consultation.    If you have questions, please do not hesitate to call me. I look forward to following your patient along with you.         Sincerely,        Jermaine Salter MD        CC: MD Jermaine Wilcox MD Susan A Gregory, MD Domsky, Steven M, MD  4/21/2022  6:31 PM  Sign when Signing Visit     Cardiology Note          HPI   Jennifer Sams is a 77 y.o. woman who presents for follow up for dilated cardiomyopathy, today, 4/21/2022.    As you know, she has a history of a mild ischemic cardiomyopathy with a very recent reassuring left and right heart cath, heart block with pacemaker placed 2019 and CAD who has had significant dyspnea on exertion. She was evaluated at the [facility], then here by Dr. Tidwell from a pulmonary standpoint but a pulmonary cause was not found and PFTs are normal. Dr. Carolina previously made pacemaker setting adjustments with regards to AV delay which helped somewhat initially but not completely. She previously followed with Dr. Courtney who retired, and now Dr. Moncada who did a left and right heart cath in February of 2021 found low-normal filling pressures and no residual severe CAD.     Since her last visit in March of 2021, she was feeling a bit better in terms of dyspnea up until she developed bronchitis in January of this year. She has regressed some and is now dyspneic with walking 1 flight of stairs. She had been doing better in terms of walking distances. She is trying to get back into a walking routine now that she is feeling a bit better however she has been very dizzy over the past couple of weeks. She gets dizzy when she turns her head. She had a period of total eye  sight loss back in February and was extensively worked up by opthalmology. She had an episode of syncope in a restaurant 2 weeks ago. She was sitting down eating and lost consciousness. She had an episode of diarrhea after the syncopal event. She denies chest pain, dyspnea at rest, orthopnea, PND, edema or abdominal bloating. She reports heart fluttering.    We had EP come down to interrogate her pacemaker in light of a syncopal episode, luckily she had no dysrhythmias.       Past Medical History:   Diagnosis Date   • AAA (abdominal aortic aneurysm) (CMS/Formerly [facility])    • Arthritis    • Elevated blood pressure reading without diagnosis of hypertension 4/19/2019   • Epistaxis 1/6/2020   • GERD (gastroesophageal reflux disease) 4/19/2019   • Glaucoma 4/19/2019   • Heart murmur    • Hiatal hernia    • History of hip replacement, total, right 4/19/2019    Right hip 9/ 2016 and revision 2017    • History of rheumatic fever as a child 4/19/2019   • Hypercholesterolemia 4/19/2019   • Ischemic cardiomyopathy 5/9/2019   • Kidney cysts    • Osteoarthritis of multiple joints 4/19/2019   • Osteoporosis    • Pacemaker 12/9/2019   • Raynaud's disease 4/19/2019   • RSD (reflex sympathetic dystrophy) 4/19/2019    Of left foot   • SOB (shortness of breath) 12/10/2019   • Status post insertion of drug eluting coronary artery stent 5/9/2019     Past Surgical History:   Procedure Laterality Date   • CHOLECYSTECTOMY OPEN     • CORONARY ANGIOPLASTY WITH STENT PLACEMENT  04/29/2019    of LAD   • CORONARY ANGIOPLASTY WITH STENT PLACEMENT  04/30/2019    of RCA   • DILATION AND CURETTAGE OF UTERUS     • EYE SURGERY      RIGHT CATARACT   • FOOT SURGERY Left    • JOINT REPLACEMENT Right 09/2016    total hip   • REVISION TOTAL HIP ARTHROPLASTY Right 2017   • TONSILLECTOMY         SOCIAL HISTORY  Social History     Tobacco Use   • Smoking status: Never Smoker   • Smokeless tobacco: Never Used   Vaping Use   • Vaping Use: Never used   Substance Use  "Topics   • Alcohol use: Not Currently   • Drug use: No     Family Status   Relation Name Status   • Bio Mother   at age 66   • Bio Father   at age 77        pulmonary fibrosis   • MGM     • MGF     • PGM   at age 90        natural causes   • PGF          ALLERGIES  Ace inhibitors, Atorvastatin, Brilinta [ticagrelor], Codeine, Dilaudid [hydromorphone], Morphine sulfate, Onion, and Oxycodone      Outpatient Encounter Medications as of 2022:   •  aspirin (ASPIR-81) 81 mg enteric coated tablet, Take 1 tablet (81 mg total) by mouth daily.  •  ketorolac (ACULAR) 0.5 % ophthalmic solution, Administer 1 drop into the left eye 4 (four) times a day.  •  nitroglycerin (NITROSTAT) 0.4 mg SL tablet, Place 1 tablet (0.4 mg total) under the tongue every 5 (five) minutes as needed for chest pain.  •  pantoprazole (PROTONIX) 40 mg EC tablet, Take 40 mg by mouth daily.  •  prednisoLONE acetate (PRED FORTE) 1 % ophthalmic suspension, Administer 1 drop into the left eye 4 (four) times a day.  •  rosuvastatin (CRESTOR) 20 mg tablet, Take 1 tablet (20 mg total) by mouth once daily.  •  valsartan (DIOVAN) 40 mg tablet, Take 1 tablet (40 mg total) by mouth daily. At bedtime    ROS   As per HPI.     Objective   Visit Vitals  /72   Pulse 66   Ht 1.702 m (5' 7\")   Wt 65.1 kg (143 lb 8 oz)   SpO2 98%   BMI 22.48 kg/m²   Not orthostatic, same BP with standing    Wt Readings from Last 3 Encounters:   22 65.1 kg (143 lb 8 oz)   22 64.1 kg (141 lb 6.4 oz)   22 62.6 kg (138 lb)       Physical Exam  Vitals reviewed.   Constitutional:       Appearance: She is well-developed.   HENT:      Head: Normocephalic.   Eyes:      General: No scleral icterus.  Neck:      Vascular: No JVD.   Cardiovascular:      Rate and Rhythm: Normal rate and regular rhythm.      Heart sounds: Normal heart sounds.   Pulmonary:      Breath sounds: Normal breath sounds.   Skin:     General: Skin is " warm and dry.   Neurological:      Mental Status: She is alert and oriented to person, place, and time.   Psychiatric:         Judgment: Judgment normal.         Lab Results   Component Value Date    GLUCOSE 92 02/04/2022    CALCIUM 9.1 02/04/2022     02/04/2022    K 4.0 02/04/2022    CO2 28 02/04/2022     02/04/2022    BUN 14 02/04/2022    CREATININE 0.89 02/04/2022     Lab Results   Component Value Date    ALT 13 02/04/2022    AST 22 02/04/2022    ALKPHOS 72 02/04/2022    BILITOT 0.6 02/04/2022     Lab Results   Component Value Date    WBC 5.8 02/04/2022    HGB 13.0 02/04/2022    HCT 40.5 02/04/2022    MCV 89.2 02/04/2022     02/04/2022     Lab Results   Component Value Date    TSH 1.86 01/17/2020     Lab Results   Component Value Date    CHOL 156 02/04/2022    CHOL 161 12/10/2021    CHOL 162 02/08/2021     Lab Results   Component Value Date    HDL 71 02/04/2022    HDL 67 12/10/2021    HDL 68 02/08/2021     Lab Results   Component Value Date    LDLCALC 66 02/04/2022    LDLCALC 76 12/10/2021    LDLCALC 59 02/08/2021     Lab Results   Component Value Date    TRIG 107 02/04/2022    TRIG 93 12/10/2021    TRIG 177 (H) 02/08/2021     No results found for: CHOLHDL  No results found for: HGBA1C     EKG    Performed on 4-21-22 and personally reviewed:  Sinus at 66 bpm   V-paced    Imaging    Echo 11/29/19:  Technically limited study no gross chamber enlargement  Imaging done just to exclude pericardial effusion Limited echo study  No regional wall motion abnormality preserved ejection fraction 55 to 60%  Aortic sclerosis  Mitral tricuspid valves appear normal  Prominent anterior fat pad no evidence of pericardial effusion or tamponade    CXR 1/17/2020:  No active disease.    PFTs 12/24/20:        LHC/RHC 2/12/21:  1. Normal right and left heart filling pressures (RA 3, PA 20/5 with mean of 11 mmHg, and PCW 6 mm Hg).  2. Patent prior stents in the mid and distal LAD.  3. 50% ostial Cx stenosis, not  functionally significant by iFR (0.95). Patent proximal Cx stent.  4. Patent mid and distal RCA stents.    Echo 2/17/21:  · Left Ventricle: Normal ventricle size. Normal wall thickness. Mildly decreased systolic function. Estimated EF 45- 50%. No regional wall motion abnormalities. Grade I diastolic dysfunction. Diastolic inflow pattern consistent with impaired relaxation. Normal left atrial pressure.  · Aortic Valve: Tricuspid valve. Sclerotic leaflets.  · Sinuses of Valsalva normal-sized. Ascending aorta normal-sized.  · Mild mitral annular calcification.  · Left Atrium: Normal-sized atrium. Normal doppler flow of pulmonary veins.  · Right Ventricle: Normal ventricle size. Low normal systolic function. Pacer wire present. Normal wall thickness.  · Right Atrium: Normal-sized atrium. Pacer/ICD lead present.  · Tricuspid Valve: Normal structure. Mild regurgitation. Estimated RVSP = 25 mmHg. The regurgitation jet is central. Normal hepatic vein systolic flow.  · IVC/SVC: Normal-sized IVC. IVC collapses >50% during inspiration. Normal hepatic vein flow.  · Pericardium: Normal structure.     CPT July 2021  CARDIOPULMONARY GAS EXCHANGE:  1. The test was near-maximal as defined by a respiratory exchange ratio of 1.10(normal >1.10).  2. The peak VO2 was 14.7 cc/kg/min which is 62% predicted (normal >85%).  3. The anaerobic threshold was 39% predicted (normal >40%).  4. The peak minute ventilation was 43L/min yielding a normal breathing reserve of 52% (normal >30%).  5. The VE/VCO2 slope was 43(normal <34).  6. Resting spirometry showed an FVC of 98%, FEV1 of 99% consistent with normal spirometry.     CONCLUSIONS:  1. By respiratory exchange ratio, the test was near maximal thus peak VO2 achieved may slightly underestimate her true exercise capacity.  2. Peak VO2 achieved was 14.7 cc/kg/min which is only 62% of predicted.  In combination with a low anaerobic threshold there is likely an element of deconditioning.  3. Her  breathing reserve and spirometry suggest a cardiac rather than pulmonary limitation to exercise.  4. Suggest a regular exercise regimen to potentially increase functional capacity.     TTE 2-9-22  · Left Ventricle: Normal ventricle size. Mild concentric left ventricular hypertrophy. Mild-to-moderately decreased systolic function. Estimated EF 40- 45%. LV SVI low at 28 mL/m² per beat. Basal and mid inferolateral akinesis, mid and apical anteroseptal akinesisGrade I diastolic dysfunction. Diastolic inflow pattern consistent with impaired relaxation. Normal left atrial pressure.  · Tricuspid aortic valve. Sclerotic aortic valve leaflets.  · Aorta: Sinuses of Valsalva normal-sized.  · Normal leaflet structure of the mitral valve.  · Normal-sized LA. Doppler flow of pulmonary veins not obtained.  · Normal-sized RV. Mildly reduced RV systolic function. Pacemaker/ICD lead present in the RV. Normal RV wall thickness. Mid free wall akinetic.  · Normal-sized RA. Pacemaker wire present in the RA.  · Tricuspid Valve: Normal structure. Trace regurgitation. Estimated RVSP = 27 mmHg.  · Pulmonic valve not well visualized.  · IVC collapses <50% during inspiration.  · Evidence of a prominent pericardial fat pad.    [employer] 3-11-22  1. This is a very technically difficult study.  Patient showed excessive motion during image acquisition, imaged with arms at sides, and diaphragmatic and GI interference.   2. Regadenoson technetium tetrofosmin shows a small, fixed defect in the apical to mid inferior wall, suggestive of tissue attenuation, although scar cannot be excluded. There is no myocardium at risk. U  3. EKG is non-diagnostic due to ventricular pacing.  4. Gated SPECT imaging was unable to be obtained.   5. No prior study for comparison. Prior nuclear imaging was non-diagnostic, exercise testing.       ASSESSMENT AND PLAN  1. Dyspnea on exertion  Not a clear singular diagnosis. Probably multifactorial.  She has been much less  active in 2020 with COVID-19 and certainly deconditioning plays a part.  This improved in 2021.  She feels her worsening is generally set off with bronchitis and a very delayed recovery.  Her PFTs are normal as well as her pulmonary exam.  She had a thorough cardiac work-up. Her left heart catheterization showed no new severe disease.  Her right heart catheterization actually showed low filling pressures and low pulmonary pressures. With possible Raynaud's it is important to rule out pulmonary hypertension which was done.  She does describe symptoms that seem out of proportion to just deconditioning, therefore at the last visit we ordered a cardiopulmonary stress test which confirmed deconditioning. We discussed that deconditioning is certainly playing a part and that her exercise and respiratory tolerance should improve with regular exercise and thus advised her to start a walking program.  She also has a progressing ischemic cardiomyopathy and has not yet been on medications despite Dr. Moncada's attempts to start goal-directed medical therapy.  Her echocardiogram from February showed her EF is down some from prior, now 40-45%. She has been resistant to adding new medications for her heart failure. She is even more resistant now with dizziness.    2. Chronic systolic and diastolic CHF, ACC Stage C, NYHA class 3  Her EF dropped a bit as outlined above this year. We discussed the importance of starting GDMT for HF. She is agreeable to Valsartan 40 mg daily at bedtime. We will start Toprol XL 25 mg at bedtime next visit if able. No extra volume on exam and a right heart cath showed low filling pressures, so diuretics are not indicated.  She is concerned about potential cost of Entresto and given the dizziness described I will avoid anything with a diuretic effect for now thus the decision to go with valsartan to start with.    3. CAD.  non-STEMI 4/26/19, three-vessel CAD; status post FRANK x 2 LAD 4/27/19 + FRANK x 4 RCA  4/29/19 + FRANK x 1 LCX OM2 5/2/19  She will continue aspirin. She also will continue statin therapy.      4. Dyslipidemia  She should continue with rosuvastatin therapy.    5. Discoloration of toes, concern for peripheral vascular disease  She describes what could be Raynaud's of her feet but does not get it in her hands.  She does have reduced pulses in her left foot.  I therefore have ordered ankle-brachial index and ultrasound to look at her arterial circulation. She was found to have small vessel disease.     6.  Abdominal aortic aneurysm.  2.9 cm on imaging in 2018.  This is being [facility] at [facility].      7.  Mobitz 2 AV block status post pacemaker 2019.  She follows with Dr. Carolina. No dysrhythmias found on interrogation today.    8. Dizziness  We will rule out carotid stenosis given sudden loss of sight, dizziness with turning head. She is not orthostatic today. If carotid ultrasound is unremarkable, she may benefit from a neurologic evaluation. She had an ENT evaluation which she reports no findings. We ordered a complete set of blood work-CBC, CMP, Mag, Thyroid function, and iron studies.          By signing my name below, I, Loretta Long, attest that this documentation has been prepared under the direction and in the presence of Jermaine Salter MD    Sincerely,  ARIAN Cadet  4/21/2022     I spent 45 minutes on this date of service performing the following activities: obtaining history, performing examination, entering orders, documenting, preparing for visit, obtaining / reviewing records, providing counseling and education, independently reviewing study/studies and communicating results.    Patient seen and examined.  I agree with the findings and recommendations of the scribed note above with my modifications. We reviewed notes from Dr. Carolina, Dr. Moncada, and Dr Perez. We reviewed CPT, echo and lexiscan results. We ordered a carotid ultrasound and valsartan 40 mg daily. We  ordered a set of labs-CBC, CMP, Iron studies, thyroid function, and Magnesium.  I will see her back in a few months or sooner should the need arise.  Please not hesitate to contact me with any questions or concerns.    Sincerely,    Jermaine Salter MD  4/21/2022

## 2022-04-21 NOTE — ASSESSMENT & PLAN NOTE
Patient with a history of coronary artery disease requiring stents to her LAD, RCA, and circumflex.  She is maintained on aspirin.  She had to stop Plavix due to significant nosebleeds.  She follows with Dr. Noé Moncada.

## 2022-04-21 NOTE — PROGRESS NOTES
Electrophysiology       Reason for visit: Follow-up   HPI   Jennifer Sams is a 77 y.o. female who comes to the office today for follow-up of her device and related arrhythmia issues.  She is status post dual-chamber pacemaker secondary to 2-1 AV block.  Her past medical history is significant for CAD requiring 8 stents including stents to her LAD, RCA, and circumflex and mild ischemic cardiomyopathy.    She comes to the visit today and reports that she had a syncopal episode on March 30 while she was out to dinner. She noted she had blurry vision that lasted a few minutes and then she needed assistance to the bathroom where she had diarrhea.  She is inquiring about any apneas on her device that could contribute to this.  She also saw Dr. Salter today to evaluate her chronic shortness of breath.     Today she denies chest pain, shortness of breath, palpitations, lightheadedness, dizziness, or syncope.      Past Medical History:   Diagnosis Date   • AAA (abdominal aortic aneurysm) (CMS/[facility])    • Arthritis    • Elevated blood pressure reading without diagnosis of hypertension 4/19/2019   • Epistaxis 1/6/2020   • GERD (gastroesophageal reflux disease) 4/19/2019   • Glaucoma 4/19/2019   • Heart murmur    • Hiatal hernia    • History of hip replacement, total, right 4/19/2019    Right hip 9/ 2016 and revision 2017    • History of rheumatic fever as a child 4/19/2019   • Hypercholesterolemia 4/19/2019   • Ischemic cardiomyopathy 5/9/2019   • Kidney cysts    • Osteoarthritis of multiple joints 4/19/2019   • Osteoporosis    • Pacemaker 12/9/2019   • Raynaud's disease 4/19/2019   • RSD (reflex sympathetic dystrophy) 4/19/2019    Of left foot   • SOB (shortness of breath) 12/10/2019   • Status post insertion of drug eluting coronary artery stent 5/9/2019     Past Surgical History:   Procedure Laterality Date   • CHOLECYSTECTOMY OPEN     • CORONARY ANGIOPLASTY WITH STENT PLACEMENT  04/29/2019    of LAD   • CORONARY  ANGIOPLASTY WITH STENT PLACEMENT  04/30/2019    of RCA   • DILATION AND CURETTAGE OF UTERUS     • EYE SURGERY      RIGHT CATARACT   • FOOT SURGERY Left    • JOINT REPLACEMENT Right 09/2016    total hip   • REVISION TOTAL HIP ARTHROPLASTY Right 2017   • TONSILLECTOMY       Allergies:  Ace inhibitors, Atorvastatin, Brilinta [ticagrelor], Codeine, Dilaudid [hydromorphone], Morphine sulfate, Onion, and Oxycodone    Current Outpatient Medications on File Prior to Visit   Medication Sig Dispense Refill   • aspirin (ASPIR-81) 81 mg enteric coated tablet Take 1 tablet (81 mg total) by mouth daily. 90 tablet 1   • ketorolac (ACULAR) 0.5 % ophthalmic solution Administer 1 drop into the left eye 4 (four) times a day.     • nitroglycerin (NITROSTAT) 0.4 mg SL tablet Place 1 tablet (0.4 mg total) under the tongue every 5 (five) minutes as needed for chest pain. 25 tablet 4   • pantoprazole (PROTONIX) 40 mg EC tablet Take 40 mg by mouth daily.     • prednisoLONE acetate (PRED FORTE) 1 % ophthalmic suspension Administer 1 drop into the left eye 4 (four) times a day.     • rosuvastatin (CRESTOR) 20 mg tablet Take 1 tablet (20 mg total) by mouth once daily. 90 tablet 3   • valsartan (DIOVAN) 40 mg tablet Take 1 tablet (40 mg total) by mouth daily. At bedtime 90 tablet 3     No current facility-administered medications on file prior to visit.     Social History     Socioeconomic History   • Marital status: Single   Tobacco Use   • Smoking status: Never Smoker   • Smokeless tobacco: Never Used   Vaping Use   • Vaping Use: Never used   Substance and Sexual Activity   • Alcohol use: Not Currently   • Drug use: No   • Sexual activity: Defer     Family History   Problem Relation Age of Onset   • Congenital heart disease Biological Mother         noted at autopsy   • Pulmonary fibrosis Biological Father    • Heart attack Paternal Grandfather      Review of Systems   Constitutional: Negative.    HENT: Negative.    Eyes: Negative.     Respiratory: Negative.    Cardiovascular: Negative.    Gastrointestinal: Negative.    Endocrine: Negative.    Genitourinary: Negative.    Musculoskeletal: Negative.    Skin: Negative.    Allergic/Immunologic: Negative.    Neurological: Negative.    Hematological: Negative.    Psychiatric/Behavioral: Negative.      There were no vitals filed for this visit.  Wt Readings from Last 3 Encounters:   04/21/22 65.1 kg (143 lb 8 oz)   04/06/22 64.1 kg (141 lb 6.4 oz)   03/11/22 62.6 kg (138 lb)     Physical Exam  Constitutional:       Appearance: Normal appearance. She is normal weight.   HENT:      Head: Normocephalic and atraumatic.   Cardiovascular:      Rate and Rhythm: Normal rate and regular rhythm.      Pulses: Normal pulses.      Heart sounds: Normal heart sounds.   Pulmonary:      Effort: Pulmonary effort is normal.      Breath sounds: Normal breath sounds.   Skin:     General: Skin is warm and dry.   Neurological:      Mental Status: She is alert and oriented to person, place, and time.        Lab Results   Component Value Date    WBC 5.8 02/04/2022    HGB 13.0 02/04/2022    HCT 40.5 02/04/2022     02/04/2022    CHOL 156 02/04/2022    TRIG 107 02/04/2022    HDL 71 02/04/2022    LDLCALC 66 02/04/2022    ALT 13 02/04/2022    AST 22 02/04/2022     02/04/2022    K 4.0 02/04/2022    CREATININE 0.89 02/04/2022    BUN 14 02/04/2022    TSH 1.86 01/17/2020    INR 1.0 11/18/2019       Cardiac Imaging    TRANSTHORACIC ECHO (TTE) COMPLETE 02/09/2022    Interpretation Summary  · Left Ventricle: Normal ventricle size. Mild concentric left ventricular hypertrophy. Mild-to-moderately decreased systolic function. Estimated EF 40- 45%. LV SVI low at 28 mL/m² per beat. Basal and mid inferolateral akinesis, mid and apical anteroseptal akinesisGrade I diastolic dysfunction. Diastolic inflow pattern consistent with impaired relaxation. Normal left atrial pressure.  · Tricuspid aortic valve. Sclerotic aortic valve  leaflets.  · Aorta: Sinuses of Valsalva normal-sized.  · Normal leaflet structure of the mitral valve.  · Normal-sized LA. Doppler flow of pulmonary veins not obtained.  · Normal-sized RV. Mildly reduced RV systolic function. Pacemaker/ICD lead present in the RV. Normal RV wall thickness. Mid free wall akinetic.  · Normal-sized RA. Pacemaker wire present in the RA.  · Tricuspid Valve: Normal structure. Trace regurgitation. Estimated RVSP = 27 mmHg.  · Pulmonic valve not well visualized.  · IVC collapses <50% during inspiration.  · Evidence of a prominent pericardial fat pad.    Limited study.  Tissue velocities not obtained.  Apical 2 chamber view not available.  Patient declined Definity.    Most recent stress test results:    CV NM STRESS REGADENOSON W OMA PERF SPECT MULTI 03/11/2022 (Final) 3/11/2022    Interpretation Summary  · Left ventricular perfusion probably normal.    1. This is a very technically difficult study.  Patient showed excessive motion during image acquisition, imaged with arms at sides, and diaphragmatic and GI interference.  2. Regadenoson technetium tetrofosmin shows a small, fixed defect in the apical to mid inferior wall, suggestive of tissue attenuation, although scar cannot be excluded. There is no myocardium at risk. U  3. EKG is non-diagnostic due to ventricular pacing.  4. Gated SPECT imaging was unable to be obtained.  5. No prior study for comparison. Prior nuclear imaging was non-diagnostic, exercise testing.        ECG: Sinus rhythm with ventricular pacing    Other Cardiac Studies: As above     Mobitz type 2 second degree atrioventricular block  She is asymptomatic in the setting of a normally functioning dual-chamber pacemaker.    Pacemaker  The patient has a normally functioning Medtronic dual-chamber pacemaker that was placed on 11/18/2019.  The device has an estimated battery life of 10 years until RRT.  It is set to mode of DDD with a lower rate of 60 and an upper tracking  rate of 150.  She atrial paces 35% of the time and ventricularly paces 98% of the time.  There were no events noted on interrogation.  The atrial lead has a measured P wave of 2.3 mV, a lead impedance of 532 ohms, and a pacing threshold of 0.5 V at 0.4 ms.  The ventricular lead has a lead impedance of 494 ohms and a pacing threshold of 0.75 V at 0.4 ms.  I did not make any changes to the permanent programming of this device.    There were no events noted on the device interrogation to suggest they were related to the syncopal episode on March 30th.    Coronary artery disease involving native coronary artery of native heart with angina pectoris (CMS/HCC)  Patient with a history of coronary artery disease requiring stents to her LAD, RCA, and circumflex.  She is maintained on aspirin.  She had to stop Plavix due to significant nosebleeds.  She follows with Dr. Noé Moncada.          No orders of the defined types were placed in this encounter.    There are no discontinued medications.    This letter was generated using speech recognition software. Please excuse any typographical errors.       ARIAN Cadet  4/21/2022

## 2022-04-21 NOTE — ASSESSMENT & PLAN NOTE
The patient has a normally functioning Medtronic dual-chamber pacemaker that was placed on 11/18/2019.  The device has an estimated battery life of 10 years until RRT.  It is set to mode of DDD with a lower rate of 60 and an upper tracking rate of 150.  She atrial paces 35% of the time and ventricularly paces 98% of the time.  There were no events noted on interrogation.  The atrial lead has a measured P wave of 2.3 mV, a lead impedance of 532 ohms, and a pacing threshold of 0.5 V at 0.4 ms.  The ventricular lead has a lead impedance of 494 ohms and a pacing threshold of 0.75 V at 0.4 ms.  I did not make any changes to the permanent programming of this device.    There were no events noted on the device interrogation to suggest they were related to the syncopal episode on March 30th.

## 2022-04-28 LAB
ALBUMIN SERPL-MCNC: 4.3 G/DL (ref 3.6–5.1)
ALBUMIN/GLOB SERPL: 1.9 (CALC) (ref 1–2.5)
ALP SERPL-CCNC: 75 U/L (ref 37–153)
ALT SERPL-CCNC: 12 U/L (ref 6–29)
AST SERPL-CCNC: 20 U/L (ref 10–35)
BILIRUB SERPL-MCNC: 0.7 MG/DL (ref 0.2–1.2)
BUN SERPL-MCNC: 15 MG/DL (ref 7–25)
BUN/CREAT SERPL: NORMAL (CALC) (ref 6–22)
CALCIUM SERPL-MCNC: 9.4 MG/DL (ref 8.6–10.4)
CHLORIDE SERPL-SCNC: 107 MMOL/L (ref 98–110)
CO2 SERPL-SCNC: 27 MMOL/L (ref 20–32)
CREAT SERPL-MCNC: 0.83 MG/DL (ref 0.6–0.93)
ERYTHROCYTE [DISTWIDTH] IN BLOOD BY AUTOMATED COUNT: 13.3 % (ref 11–15)
FERRITIN SERPL-MCNC: 13 NG/ML (ref 16–288)
GLOBULIN SER CALC-MCNC: 2.3 G/DL (CALC) (ref 1.9–3.7)
GLUCOSE SERPL-MCNC: 73 MG/DL (ref 65–139)
HCT VFR BLD AUTO: 39.6 % (ref 35–45)
HGB BLD-MCNC: 12.7 G/DL (ref 11.7–15.5)
IRON SATN MFR SERPL: 14 % (CALC) (ref 16–45)
IRON SERPL-MCNC: 65 MCG/DL (ref 45–160)
MAGNESIUM SERPL-MCNC: 2.2 MG/DL (ref 1.5–2.5)
MCH RBC QN AUTO: 26.7 PG (ref 27–33)
MCHC RBC AUTO-ENTMCNC: 32.1 G/DL (ref 32–36)
MCV RBC AUTO: 83.2 FL (ref 80–100)
PLATELET # BLD AUTO: 223 THOUSAND/UL (ref 140–400)
PMV BLD REES-ECKER: 9.8 FL (ref 7.5–12.5)
POTASSIUM SERPL-SCNC: 3.7 MMOL/L (ref 3.5–5.3)
PROT SERPL-MCNC: 6.6 G/DL (ref 6.1–8.1)
QUEST EGFR AFRICAN AMERICAN: 79 ML/MIN/1.73M2
QUEST EGFR NON-AFR. AMERICAN: 68 ML/MIN/1.73M2
RBC # BLD AUTO: 4.76 MILLION/UL (ref 3.8–5.1)
SODIUM SERPL-SCNC: 142 MMOL/L (ref 135–146)
T3FREE SERPL-MCNC: 3.3 PG/ML (ref 2.3–4.2)
T4 FREE SERPL-MCNC: 1.3 NG/DL (ref 0.8–1.8)
TIBC SERPL-MCNC: 466 MCG/DL (CALC) (ref 250–450)
TSH SERPL-ACNC: 2.94 MIU/L (ref 0.4–4.5)
WBC # BLD AUTO: 5.1 THOUSAND/UL (ref 3.8–10.8)

## 2022-05-10 ENCOUNTER — HOSPITAL ENCOUNTER (OUTPATIENT)
Dept: CARDIOLOGY | Facility: HOSPITAL | Age: 77
Discharge: HOME | End: 2022-05-10
Attending: NURSE PRACTITIONER
Payer: MEDICARE

## 2022-05-10 DIAGNOSIS — R42 DIZZINESS: ICD-10-CM

## 2022-05-10 DIAGNOSIS — I71.40 ABDOMINAL AORTIC ANEURYSM (AAA) WITHOUT RUPTURE (CMS/HCC): ICD-10-CM

## 2022-05-10 PROCEDURE — 93880 EXTRACRANIAL BILAT STUDY: CPT | Mod: 26 | Performed by: INTERNAL MEDICINE

## 2022-05-10 PROCEDURE — 93880 EXTRACRANIAL BILAT STUDY: CPT

## 2022-05-12 LAB
LEFT CCA DIST DIAS: 19.19 CM/S
LEFT CCA DIST SYS: 68.87 CM/S
LEFT CCA MID DIAS: 15.05 CM/S
LEFT CCA MID SYS: 74.52 CM/S
LEFT CCA PROX DIAS: 13.58 CM/S
LEFT CCA PROX SYS: 73.49 CM/S
LEFT ICA DIST DIAS: 19.71 CM/S
LEFT ICA DIST SYS: 62.44 CM/S
LEFT ICA MID DIAS: 19.11 CM/S
LEFT ICA MID SYS: 44.27 CM/S
LEFT ICA PROX DIAS: 21.73 CM/S
LEFT ICA PROX SYS: 59.27 CM/S
LEFT ICA/CCA SYS: 0.91
LT BULB EDV: 10.73 CM/S
LT BULB PSV: 46.57 CM/S
LT ECA PROX EDV: 14.3 CM/S
LT ECA PROX PSV: 91.08 CM/S
LT VERTEBRAL MID EDV: 15.81 CM/S
LT VERTEBRAL MID PSV: 51.56 CM/S
RIGHT CCA DIST DIAS: 11.74 CM/S
RIGHT CCA DIST SYS: 55.32 CM/S
RIGHT CCA MID DIAS: 15.81 CM/S
RIGHT CCA MID SYS: 74.52 CM/S
RIGHT CCA PROX DIAS: 13.55 CM/S
RIGHT CCA PROX SYS: 64.35 CM/S
RIGHT ECA SYS: 89.19 CM/S
RIGHT ICA DIST DIAS: 28.98 CM/S
RIGHT ICA DIST SYS: 85.81 CM/S
RIGHT ICA MID DIAS: 25.97 CM/S
RIGHT ICA MID SYS: 87.31 CM/S
RIGHT ICA PROX DIAS: 11.67 CM/S
RIGHT ICA PROX SYS: 37.45 CM/S
RIGHT ICA/CCA SYS: 1.58
RT BULB EDV: 7.34 CM/S
RT BULB PSV: 26.11 CM/S
RT ECA PROC EDV: 10.54 CM/S
RT VERTEBRAL MID EDV: 10.44 CM/S
RT VERTEBRAL MID PSV: 33.02 CM/S

## 2022-05-30 NOTE — ASSESSMENT & PLAN NOTE
She is asymptomatic in the setting of a normally functioning dual-chamber pacemaker.   I have reviewed and confirmed nurses' notes...

## 2022-08-12 ENCOUNTER — TELEPHONE (OUTPATIENT)
Dept: SCHEDULING | Facility: CLINIC | Age: 77
End: 2022-08-12
Payer: MEDICARE

## 2022-08-12 NOTE — TELEPHONE ENCOUNTER
Annia , friend called stating pt's   suddenly yesterday. She wants to rs  her appointment and accompany her as well.    Annia will be at [facility] on  and 10/5.     Can Dr Salter see Jennifer either day in the afternoon?   Please call Annia at [phone] (w) or

## 2022-08-23 NOTE — TELEPHONE ENCOUNTER
Just called pt and left a msg, told her that once we have a date and time I will call her back. Thank you

## 2022-08-26 NOTE — TELEPHONE ENCOUNTER
I see she has appt with Dr. Carolina 9/13 at 140pm, so I could see her 230. (Then use other open slot for NPV)

## 2022-09-13 ENCOUNTER — OFFICE VISIT (OUTPATIENT)
Dept: CARDIOLOGY | Facility: CLINIC | Age: 77
End: 2022-09-13
Payer: MEDICARE

## 2022-09-13 VITALS
OXYGEN SATURATION: 98 % | HEART RATE: 76 BPM | BODY MASS INDEX: 22.05 KG/M2 | DIASTOLIC BLOOD PRESSURE: 78 MMHG | SYSTOLIC BLOOD PRESSURE: 128 MMHG | WEIGHT: 140.5 LBS | HEIGHT: 67 IN

## 2022-09-13 VITALS
BODY MASS INDEX: 22.07 KG/M2 | SYSTOLIC BLOOD PRESSURE: 112 MMHG | WEIGHT: 140.6 LBS | HEIGHT: 67 IN | DIASTOLIC BLOOD PRESSURE: 70 MMHG | RESPIRATION RATE: 16 BRPM | HEART RATE: 77 BPM

## 2022-09-13 DIAGNOSIS — I44.1 MOBITZ TYPE 2 SECOND DEGREE ATRIOVENTRICULAR BLOCK: ICD-10-CM

## 2022-09-13 DIAGNOSIS — E78.2 MIXED HYPERLIPIDEMIA: ICD-10-CM

## 2022-09-13 DIAGNOSIS — Z95.0 PACEMAKER: ICD-10-CM

## 2022-09-13 DIAGNOSIS — I25.10 CORONARY ARTERY DISEASE INVOLVING NATIVE CORONARY ARTERY OF NATIVE HEART WITHOUT ANGINA PECTORIS: ICD-10-CM

## 2022-09-13 DIAGNOSIS — I50.42 CHRONIC COMBINED SYSTOLIC AND DIASTOLIC HEART FAILURE (CMS/HCC): Primary | ICD-10-CM

## 2022-09-13 DIAGNOSIS — I71.40 ABDOMINAL AORTIC ANEURYSM (AAA) WITHOUT RUPTURE (CMS/HCC): ICD-10-CM

## 2022-09-13 DIAGNOSIS — I25.119 CORONARY ARTERY DISEASE INVOLVING NATIVE CORONARY ARTERY OF NATIVE HEART WITH ANGINA PECTORIS (CMS/HCC): ICD-10-CM

## 2022-09-13 DIAGNOSIS — R42 DIZZINESS: ICD-10-CM

## 2022-09-13 DIAGNOSIS — I25.5 ISCHEMIC CARDIOMYOPATHY: ICD-10-CM

## 2022-09-13 DIAGNOSIS — R06.02 SOB (SHORTNESS OF BREATH): Primary | ICD-10-CM

## 2022-09-13 PROCEDURE — 99214 OFFICE O/P EST MOD 30 MIN: CPT | Performed by: INTERNAL MEDICINE

## 2022-09-13 PROCEDURE — 93000 ELECTROCARDIOGRAM COMPLETE: CPT | Performed by: INTERNAL MEDICINE

## 2022-09-13 RX ORDER — CYCLOSPORINE 0.5 MG/ML
1 EMULSION OPHTHALMIC 2 TIMES DAILY
COMMUNITY
End: 2023-07-14 | Stop reason: HOSPADM

## 2022-09-13 RX ORDER — AA/PROT/LYSINE/METHIO/VIT C/B6 50-12.5 MG
10 TABLET ORAL DAILY
Qty: 90 CAPSULE | Refills: 3 | Status: SHIPPED | OUTPATIENT
Start: 2022-09-13 | End: 2022-09-13

## 2022-09-13 RX ORDER — AMOXICILLIN 500 MG/1
2000 CAPSULE ORAL AS NEEDED
Qty: 12 CAPSULE | Refills: 3 | Status: SHIPPED | OUTPATIENT
Start: 2022-09-13 | End: 2023-07-01 | Stop reason: HOSPADM

## 2022-09-13 RX ORDER — UBIDECARENONE 100 MG
100 CAPSULE ORAL DAILY
Qty: 90 CAPSULE | Refills: 3 | Status: SHIPPED | OUTPATIENT
Start: 2022-09-13 | End: 2023-03-14

## 2022-09-13 ASSESSMENT — ENCOUNTER SYMPTOMS
EYES NEGATIVE: 1
NEUROLOGICAL NEGATIVE: 1
RESPIRATORY NEGATIVE: 1
ENDOCRINE NEGATIVE: 1
MUSCULOSKELETAL NEGATIVE: 1
ALLERGIC/IMMUNOLOGIC NEGATIVE: 1
CARDIOVASCULAR NEGATIVE: 1
HEMATOLOGIC/LYMPHATIC NEGATIVE: 1
GASTROINTESTINAL NEGATIVE: 1
CONSTITUTIONAL NEGATIVE: 1
PSYCHIATRIC NEGATIVE: 1

## 2022-09-13 NOTE — PROGRESS NOTES
Electrophysiology       Reason for visit: Follow-up   HPI   Jennifer Sams is a 77 y.o. female who comes to the office today for follow-up of her device and related arrhythmia issues.      She is status post dual-chamber pacemaker secondary to 2-1 AV block.  Her past medical history is significant for CAD requiring 8 stents including stents to her LAD, RCA, and circumflex and mild ischemic cardiomyopathy.    Today she presents visit and has no complaints.  She denies chest pain, shortness of breath, palpitations, lightheadedness, dizziness, or syncope.  Unfortunately, her significant other 40 years suddenly passed away.       Past Medical History:   Diagnosis Date    AAA (abdominal aortic aneurysm) (CMS/[facility])     Arthritis     Elevated blood pressure reading without diagnosis of hypertension 4/19/2019    Epistaxis 1/6/2020    GERD (gastroesophageal reflux disease) 4/19/2019    Glaucoma 4/19/2019    Heart murmur     Hiatal hernia     History of hip replacement, total, right 4/19/2019    Right hip 9/ 2016 and revision 2017     History of rheumatic fever as a child 4/19/2019    Hypercholesterolemia 4/19/2019    Ischemic cardiomyopathy 5/9/2019    Kidney cysts     Osteoarthritis of multiple joints 4/19/2019    Osteoporosis     Pacemaker 12/9/2019    Raynaud's disease 4/19/2019    RSD (reflex sympathetic dystrophy) 4/19/2019    Of left foot    SOB (shortness of breath) 12/10/2019    Status post insertion of drug eluting coronary artery stent 5/9/2019     Past Surgical History:   Procedure Laterality Date    CHOLECYSTECTOMY OPEN      CORONARY ANGIOPLASTY WITH STENT PLACEMENT  04/29/2019    of LAD    CORONARY ANGIOPLASTY WITH STENT PLACEMENT  04/30/2019    of RCA    DILATION AND CURETTAGE OF UTERUS      EYE SURGERY      RIGHT CATARACT    FOOT SURGERY Left     JOINT REPLACEMENT Right 09/2016    total hip    REVISION TOTAL HIP ARTHROPLASTY Right 2017    TONSILLECTOMY       Allergies:  Ace  inhibitors, Atorvastatin, Brilinta [ticagrelor], Codeine, Dilaudid [hydromorphone], Morphine sulfate, Onion, and Oxycodone    Current Outpatient Medications on File Prior to Visit   Medication Sig Dispense Refill    aspirin (ASPIR-81) 81 mg enteric coated tablet Take 1 tablet (81 mg total) by mouth daily. 90 tablet 1    nitroglycerin (NITROSTAT) 0.4 mg SL tablet Place 1 tablet (0.4 mg total) under the tongue every 5 (five) minutes as needed for chest pain. 25 tablet 4    pantoprazole (PROTONIX) 40 mg EC tablet Take 40 mg by mouth daily.      rosuvastatin (CRESTOR) 20 mg tablet TAKE 1 TABLET BY MOUTH EVERY DAY 90 tablet 3    ketorolac (ACULAR) 0.5 % ophthalmic solution Administer 1 drop into the left eye 4 (four) times a day.      prednisoLONE acetate (PRED FORTE) 1 % ophthalmic suspension Administer 1 drop into the left eye 4 (four) times a day.      valsartan (DIOVAN) 40 mg tablet Take 1 tablet (40 mg total) by mouth daily. At bedtime (Patient not taking: Reported on 9/13/2022) 90 tablet 3     No current facility-administered medications on file prior to visit.     Social History     Socioeconomic History    Marital status: Single     Spouse name: None    Number of children: None    Years of education: None    Highest education level: None   Tobacco Use    Smoking status: Never Smoker    Smokeless tobacco: Never Used   Vaping Use    Vaping Use: Never used   Substance and Sexual Activity    Alcohol use: Not Currently    Drug use: No    Sexual activity: Defer     Family History   Problem Relation Age of Onset    Congenital heart disease Biological Mother         noted at autopsy    Pulmonary fibrosis Biological Father     Heart attack Paternal Grandfather      Review of Systems   Constitutional: Negative.    HENT: Negative.    Eyes: Negative.    Respiratory: Negative.    Cardiovascular: Negative.    Gastrointestinal: Negative.    Endocrine: Negative.    Genitourinary: Negative.    Musculoskeletal:  Negative.    Skin: Negative.    Allergic/Immunologic: Negative.    Neurological: Negative.    Hematological: Negative.    Psychiatric/Behavioral: Negative.      Vitals:    09/13/22 1344   BP: 112/70   Pulse: 77   Resp: 16     Wt Readings from Last 3 Encounters:   09/13/22 63.7 kg (140 lb 8 oz)   09/13/22 63.8 kg (140 lb 9.6 oz)   04/21/22 65.1 kg (143 lb 8 oz)     Physical Exam  Constitutional:       Appearance: Normal appearance. She is normal weight.   HENT:      Head: Normocephalic and atraumatic.   Cardiovascular:      Rate and Rhythm: Normal rate and regular rhythm.      Pulses: Normal pulses.      Heart sounds: Normal heart sounds.   Pulmonary:      Effort: Pulmonary effort is normal.      Breath sounds: Normal breath sounds.   Skin:     General: Skin is warm and dry.   Neurological:      Mental Status: She is alert and oriented to person, place, and time.        Lab Results   Component Value Date    WBC 5.1 04/27/2022    HGB 12.7 04/27/2022    HCT 39.6 04/27/2022     04/27/2022    CHOL 156 02/04/2022    TRIG 107 02/04/2022    HDL 71 02/04/2022    LDLCALC 66 02/04/2022    ALT 12 04/27/2022    AST 20 04/27/2022     04/27/2022    K 3.7 04/27/2022    CREATININE 0.83 04/27/2022    BUN 15 04/27/2022    TSH 2.94 04/27/2022    INR 1.0 11/18/2019       Cardiac Imaging    TRANSTHORACIC ECHO (TTE) COMPLETE 02/09/2022    Interpretation Summary  · Left Ventricle: Normal ventricle size. Mild concentric left ventricular hypertrophy. Mild-to-moderately decreased systolic function. Estimated EF 40- 45%. LV SVI low at 28 mL/m² per beat. Basal and mid inferolateral akinesis, mid and apical anteroseptal akinesisGrade I diastolic dysfunction. Diastolic inflow pattern consistent with impaired relaxation. Normal left atrial pressure.  · Tricuspid aortic valve. Sclerotic aortic valve leaflets.  · Aorta: Sinuses of Valsalva normal-sized.  · Normal leaflet structure of the mitral valve.  · Normal-sized LA. Doppler flow of  pulmonary veins not obtained.  · Normal-sized RV. Mildly reduced RV systolic function. Pacemaker/ICD lead present in the RV. Normal RV wall thickness. Mid free wall akinetic.  · Normal-sized RA. Pacemaker wire present in the RA.  · Tricuspid Valve: Normal structure. Trace regurgitation. Estimated RVSP = 27 mmHg.  · Pulmonic valve not well visualized.  · IVC collapses <50% during inspiration.  · Evidence of a prominent pericardial fat pad.    Limited study.  Tissue velocities not obtained.  Apical 2 chamber view not available.  Patient declined Definity.    Most recent stress test results:    CV NM STRESS REGADENOSON W OMA PERF SPECT MULTI 03/11/2022 (Final) 3/11/2022    Interpretation Summary  · Left ventricular perfusion probably normal.    1. This is a very technically difficult study.  Patient showed excessive motion during image acquisition, imaged with arms at sides, and diaphragmatic and GI interference.  2. Regadenoson technetium tetrofosmin shows a small, fixed defect in the apical to mid inferior wall, suggestive of tissue attenuation, although scar cannot be excluded. There is no myocardium at risk. U  3. EKG is non-diagnostic due to ventricular pacing.  4. Gated SPECT imaging was unable to be obtained.  5. No prior study for comparison. Prior nuclear imaging was non-diagnostic, exercise testing.        ECG: Sinus rhythm with ventricular pacing    Other Cardiac Studies: As above     Mobitz type 2 second degree atrioventricular block  She has a normally functioning dual-chamber pacemaker.    Ischemic cardiomyopathy  Her most recent echocardiogram in February 2022 demonstrated an EF of 40 to 45%.  She is euvolemic on exam today. She follows closely with Dr. Salter.     Pacemaker  She is a normally functioning Medtronic dual-chamber pacemaker implanted on 11/18/2019.  Battery life estimated at 9 years until RRT.  There were no events on interrogation today.  There is atrial pacing 40% of the time and V pacing  100% of the time.  Mode is DDD with a lower rate of 60 upper tracking rate of 150.  The atrial lead measures a P wave of 3.1 mV, pacing threshold of 0.5 V at 0.4 ms, and a lead impedance of 494 ohms.  The RV lead has a measured R wave of 11.1 mV, pacing threshold of 1.0 V at 0.4 ms, and a lead impedance of 456 ohms.  There were no changes made to the permanent programming of this device.    Coronary artery disease involving native coronary artery of native heart with angina pectoris (CMS/[facility])  She has a history of coronary artery disease with non-STEMI in April 2019 and is status post drug-eluting stent x2 to the LAD, drug-eluting stent x4 to the RCA, drug-eluting stent x1 to the left circumflex.  She is maintained on aspirin and statin therapy.  Plavix had to be stopped in the setting of severe nosebleeds.  She has no symptoms of angina.          Jermaine Carolina MD  9/21/2022

## 2022-09-13 NOTE — ASSESSMENT & PLAN NOTE
She is a normally functioning Medtronic dual-chamber pacemaker implanted on 11/18/2019.  Battery life estimated at 9 years until RRT.  There were no events on interrogation today.  There is atrial pacing 40% of the time and V pacing 100% of the time.  Mode is DDD with a lower rate of 60 upper tracking rate of 150.  The atrial lead measures a P wave of 3.1 mV, pacing threshold of 0.5 V at 0.4 ms, and a lead impedance of 494 ohms.  The RV lead has a measured R wave of 11.1 mV, pacing threshold of 1.0 V at 0.4 ms, and a lead impedance of 456 ohms.  There were no changes made to the permanent programming of this device.

## 2022-09-13 NOTE — LETTER
September 13, 2022     Sabrina Caruso MD  [address] [facility] [address]    Patient: Jennifer Sams  YOB: 1945  Date of Visit: 9/13/2022      Dear Dr. Caruso:    Thank you for referring Jennifer Sams to me for evaluation. Below are my notes for this consultation.    If you have questions, please do not hesitate to call me. I look forward to following your patient along with you.         Sincerely,        Jermaine Salter MD        CC: MD Nayely Allen MD Domsky, Steven M, MD  9/13/2022  7:03 PM  Sign when Signing Visit     Cardiology Note          HPI   Jennifer Sams is a 77 y.o. woman who presents for follow up for dilated cardiomyopathy, today, 9/13/2022.    As you know, she has a history of a mild ischemic cardiomyopathy with a very recent reassuring left and right heart cath, heart block with pacemaker placed 2019 and CAD who has had significant dyspnea on exertion. She was evaluated at the [facility], then here by Dr. Tidwell from a pulmonary standpoint but a pulmonary cause was not found and PFTs are normal. Dr. Carolina previously made pacemaker setting adjustments with regards to AV delay which helped somewhat initially but not completely. She previously followed with Dr. Courtney who retired, and now Dr. Moncada who did a left and right heart cath in February of 2021 found low-normal filling pressures and no residual severe CAD.     Since her last visit in April, she unfortunately has been having eye pain and blurry vision since her eye surgery. She continues with dizziness on occasion. She denies chest pain, dyspnea at rest, orthopnea, PND, edema or abdominal bloating. She has chronic back pain. No further syncopal episodes.      Past Medical History:   Diagnosis Date    AAA (abdominal aortic aneurysm) (CMS/[facility])     Arthritis     Elevated blood pressure reading without diagnosis of hypertension 4/19/2019    Epistaxis 1/6/2020    GERD  (gastroesophageal reflux disease) 2019    Glaucoma 2019    Heart murmur     Hiatal hernia     History of hip replacement, total, right 2019    Right hip 2016 and revision 2017     History of rheumatic fever as a child 2019    Hypercholesterolemia 2019    Ischemic cardiomyopathy 2019    Kidney cysts     Osteoarthritis of multiple joints 2019    Osteoporosis     Pacemaker 2019    Raynaud's disease 2019    RSD (reflex sympathetic dystrophy) 2019    Of left foot    SOB (shortness of breath) 12/10/2019    Status post insertion of drug eluting coronary artery stent 2019     Past Surgical History:   Procedure Laterality Date    CHOLECYSTECTOMY OPEN      CORONARY ANGIOPLASTY WITH STENT PLACEMENT  2019    of LAD    CORONARY ANGIOPLASTY WITH STENT PLACEMENT  2019    of RCA    DILATION AND CURETTAGE OF UTERUS      EYE SURGERY      RIGHT CATARACT    FOOT SURGERY Left     JOINT REPLACEMENT Right 2016    total hip    REVISION TOTAL HIP ARTHROPLASTY Right 2017    TONSILLECTOMY         SOCIAL HISTORY  Social History     Tobacco Use    Smoking status: Never Smoker    Smokeless tobacco: Never Used   Vaping Use    Vaping Use: Never used   Substance Use Topics    Alcohol use: Not Currently    Drug use: No     Family Status   Relation Name Status    Bio Mother   at age 66    Bio Father   at age 77        pulmonary fibrosis    MGM      MGF      PGM   at age 90        natural causes    PGF          ALLERGIES  Ace inhibitors, Atorvastatin, Brilinta [ticagrelor], Codeine, Dilaudid [hydromorphone], Morphine sulfate, Onion, and Oxycodone      Outpatient Encounter Medications as of 2022:     amoxicillin (AMOXIL) 500 mg capsule, Take 4 capsules (2,000 mg total) by mouth as needed (one hour prior to dental cleaning).    aspirin (ASPIR-81) 81 mg enteric coated tablet, Take 1 tablet (81 mg  "total) by mouth daily.    coenzyme Q10 (COQ10) 100 mg capsule, Take 1 capsule (100 mg total) by mouth daily.    cycloSPORINE (RESTASIS) 0.05 % ophthalmic emulsion, Administer 1 drop into both eyes 2 (two) times a day.    nitroglycerin (NITROSTAT) 0.4 mg SL tablet, Place 1 tablet (0.4 mg total) under the tongue every 5 (five) minutes as needed for chest pain.    pantoprazole (PROTONIX) 40 mg EC tablet, Take 40 mg by mouth daily.    rosuvastatin (CRESTOR) 20 mg tablet, TAKE 1 TABLET BY MOUTH EVERY DAY    ketorolac (ACULAR) 0.5 % ophthalmic solution, Administer 1 drop into the left eye 4 (four) times a day.    prednisoLONE acetate (PRED FORTE) 1 % ophthalmic suspension, Administer 1 drop into the left eye 4 (four) times a day.    valsartan (DIOVAN) 40 mg tablet, Take 1 tablet (40 mg total) by mouth daily. At bedtime (Patient not taking: Reported on 9/13/2022)    [DISCONTINUED] coenzyme Q10 (COQ10) 10 mg capsule, Take 1 capsule (10 mg total) by mouth daily.    ROS   As per HPI and otherwise negative.    Objective   Visit Vitals  /78   Pulse 76   Ht 1.702 m (5' 7\")   Wt 63.7 kg (140 lb 8 oz)   SpO2 98%   BMI 22.01 kg/m²       Wt Readings from Last 3 Encounters:   09/13/22 63.7 kg (140 lb 8 oz)   09/13/22 63.8 kg (140 lb 9.6 oz)   04/21/22 65.1 kg (143 lb 8 oz)       Physical Exam  Vitals reviewed.   Constitutional:       Appearance: She is well-developed.   HENT:      Head: Normocephalic.   Eyes:      General: No scleral icterus.  Neck:      Vascular: No JVD.   Cardiovascular:      Rate and Rhythm: Normal rate and regular rhythm.      Heart sounds: Normal heart sounds.   Pulmonary:      Breath sounds: Normal breath sounds.   Skin:     General: Skin is warm and dry.   Neurological:      Mental Status: She is alert and oriented to person, place, and time.   Psychiatric:         Judgment: Judgment normal.         Lab Results   Component Value Date    GLUCOSE 73 04/27/2022    CALCIUM 9.4 04/27/2022     " 04/27/2022    K 3.7 04/27/2022    CO2 27 04/27/2022     04/27/2022    BUN 15 04/27/2022    CREATININE 0.83 04/27/2022     Lab Results   Component Value Date    ALT 12 04/27/2022    AST 20 04/27/2022    ALKPHOS 75 04/27/2022    BILITOT 0.7 04/27/2022     Lab Results   Component Value Date    WBC 5.1 04/27/2022    HGB 12.7 04/27/2022    HCT 39.6 04/27/2022    MCV 83.2 04/27/2022     04/27/2022     Lab Results   Component Value Date    TSH 2.94 04/27/2022     Lab Results   Component Value Date    CHOL 156 02/04/2022    CHOL 161 12/10/2021    CHOL 162 02/08/2021     Lab Results   Component Value Date    HDL 71 02/04/2022    HDL 67 12/10/2021    HDL 68 02/08/2021     Lab Results   Component Value Date    LDLCALC 66 02/04/2022    LDLCALC 76 12/10/2021    LDLCALC 59 02/08/2021     Lab Results   Component Value Date    TRIG 107 02/04/2022    TRIG 93 12/10/2021    TRIG 177 (H) 02/08/2021     No results found for: CHOLHDL  No results found for: HGBA1C     EKG    Performed on 9/13/2022 and personally reviewed:  Sinus at 77 bpm   V-paced    Imaging    Echo 11/29/19:  Technically limited study no gross chamber enlargement  Imaging done just to exclude pericardial effusion Limited echo study  No regional wall motion abnormality preserved ejection fraction 55 to 60%  Aortic sclerosis  Mitral tricuspid valves appear normal  Prominent anterior fat pad no evidence of pericardial effusion or tamponade    CXR 1/17/2020:  No active disease.    PFTs 12/24/20:        LHC/RHC 2/12/21:  1. Normal right and left heart filling pressures (RA 3, PA 20/5 with mean of 11 mmHg, and PCW 6 mm Hg).  2. Patent prior stents in the mid and distal LAD.  3. 50% ostial Cx stenosis, not functionally significant by iFR (0.95). Patent proximal Cx stent.  4. Patent mid and distal RCA stents.    Echo 2/17/21:  · Left Ventricle: Normal ventricle size. Normal wall thickness. Mildly decreased systolic function. Estimated EF 45- 50%. No regional wall  motion abnormalities. Grade I diastolic dysfunction. Diastolic inflow pattern consistent with impaired relaxation. Normal left atrial pressure.  · Aortic Valve: Tricuspid valve. Sclerotic leaflets.  · Sinuses of Valsalva normal-sized. Ascending aorta normal-sized.  · Mild mitral annular calcification.  · Left Atrium: Normal-sized atrium. Normal doppler flow of pulmonary veins.  · Right Ventricle: Normal ventricle size. Low normal systolic function. Pacer wire present. Normal wall thickness.  · Right Atrium: Normal-sized atrium. Pacer/ICD lead present.  · Tricuspid Valve: Normal structure. Mild regurgitation. Estimated RVSP = 25 mmHg. The regurgitation jet is central. Normal hepatic vein systolic flow.  · IVC/SVC: Normal-sized IVC. IVC collapses >50% during inspiration. Normal hepatic vein flow.  · Pericardium: Normal structure.     CPT July 2021  CARDIOPULMONARY GAS EXCHANGE:  1. The test was near-maximal as defined by a respiratory exchange ratio of 1.10(normal >1.10).  2. The peak VO2 was 14.7 cc/kg/min which is 62% predicted (normal >85%).  3. The anaerobic threshold was 39% predicted (normal >40%).  4. The peak minute ventilation was 43L/min yielding a normal breathing reserve of 52% (normal >30%).  5. The VE/VCO2 slope was 43(normal <34).  6. Resting spirometry showed an FVC of 98%, FEV1 of 99% consistent with normal spirometry.     CONCLUSIONS:  1. By respiratory exchange ratio, the test was near maximal thus peak VO2 achieved may slightly underestimate her true exercise capacity.  2. Peak VO2 achieved was 14.7 cc/kg/min which is only 62% of predicted.  In combination with a low anaerobic threshold there is likely an element of deconditioning.  3. Her breathing reserve and spirometry suggest a cardiac rather than pulmonary limitation to exercise.  4. Suggest a regular exercise regimen to potentially increase functional capacity.     TTE 2-9-22  · Left Ventricle: Normal ventricle size. Mild concentric left  ventricular hypertrophy. Mild-to-moderately decreased systolic function. Estimated EF 40- 45%. LV SVI low at 28 mL/m² per beat. Basal and mid inferolateral akinesis, mid and apical anteroseptal akinesisGrade I diastolic dysfunction. Diastolic inflow pattern consistent with impaired relaxation. Normal left atrial pressure.  · Tricuspid aortic valve. Sclerotic aortic valve leaflets.  · Aorta: Sinuses of Valsalva normal-sized.  · Normal leaflet structure of the mitral valve.  · Normal-sized LA. Doppler flow of pulmonary veins not obtained.  · Normal-sized RV. Mildly reduced RV systolic function. Pacemaker/ICD lead present in the RV. Normal RV wall thickness. Mid free wall akinetic.  · Normal-sized RA. Pacemaker wire present in the RA.  · Tricuspid Valve: Normal structure. Trace regurgitation. Estimated RVSP = 27 mmHg.  · Pulmonic valve not well visualized.  · IVC collapses <50% during inspiration.  · Evidence of a prominent pericardial fat pad.    [place] 3-11-22  1. This is a very technically difficult study.  Patient showed excessive motion during image acquisition, imaged with arms at sides, and diaphragmatic and GI interference.   2. Regadenoson technetium tetrofosmin shows a small, fixed defect in the apical to mid inferior wall, suggestive of tissue attenuation, although scar cannot be excluded. There is no myocardium at risk.   3. EKG is non-diagnostic due to ventricular pacing.  4. Gated SPECT imaging was unable to be obtained.   5. No prior study for comparison. Prior nuclear imaging was non-diagnostic, exercise testing.       ASSESSMENT AND PLAN    1. Chronic systolic and diastolic CHF, ACC Stage C, NYHA class 3  No extra volume on exam and a right heart cath showed low filling pressures, so diuretics are not indicated.  She is concerned about potential cost of Entresto and given the dizziness described I will avoid anything with a diuretic effect for now thus the decision to go with valsartan to start with  as discussed at her last visit. She held of on starting this with dizziness concerns. Her blood pressure has been stable. We will recheck an echocardiogram at next visit.    2. CAD.  non-STEMI 4/26/19, three-vessel CAD; status post FRANK x 2 LAD 4/27/19 + FRANK x 4 RCA 4/29/19 + FRANK x 1 LCX OM2 5/2/19  She will continue aspirin. She also will continue statin therapy.  Dr. Moncada attempted to keep her on dual antiplatelet therapy given her numerous stents but ultimately could not be tolerated with incessant nosebleeds.  Thankfully this is resolved with switching to monotherapy.    3. Dyslipidemia  She should continue with rosuvastatin therapy.    4. Discoloration of toes, concern for peripheral vascular disease  She describes what could be Raynaud's of her feet but does not get it in her hands.  She does have reduced pulses in her left foot.  I therefore have ordered ankle-brachial index and ultrasound to look at her arterial circulation. She was found to have small vessel disease.     5.  Abdominal aortic aneurysm.  2.9 cm on imaging in 2018.  [facility].      6.  Mobitz 2 AV block status post pacemaker 2019.  She follows with Dr. Carolina. No dysrhythmias found on interrogation today.    7. Dizziness  Likely related to her visual issues with blurry vision and eye pain. Carotid ultrasound without significant stenosis. Blood work has been unremarkable.          By signing my name below, I, Loretta Long, attest that this documentation has been prepared under the direction and in the presence of Jermaine Salter MD    Sincerely,  ARIAN Cadet  9/13/2022     Patient seen and examined.  I agree with the findings and recommendations of the scribed note above with my modifications. We reviewed notes ophthalmology and EP. We reviewed last labs. We ordered an echocardiogram to be done at next visit. We prescribed CO-Q-10 with her statin therapy.  Please not hesitate to contact me with  any questions or concerns.    Sincerely,    Jermaine Salter MD  9/13/2022

## 2022-09-13 NOTE — ASSESSMENT & PLAN NOTE
Her most recent echocardiogram in February 2022 demonstrated an EF of 40 to 45%.  She is euvolemic on exam today. She follows closely with Dr. Salter.

## 2022-09-13 NOTE — PROGRESS NOTES
Cardiology Note          HPI   Jennifer Sams is a 77 y.o. woman who presents for follow up for dilated cardiomyopathy, today, 9/13/2022.    As you know, she has a history of a mild ischemic cardiomyopathy with a very recent reassuring left and right heart cath, heart block with pacemaker placed 2019 and CAD who has had significant dyspnea on exertion. She was evaluated at the [facility], then here by Dr. Tidwell from a pulmonary standpoint but a pulmonary cause was not found and PFTs are normal. Dr. Carolina previously made pacemaker setting adjustments with regards to AV delay which helped somewhat initially but not completely. She previously followed with Dr. Courtney who retired, and now Dr. Moncada who did a left and right heart cath in February of 2021 found low-normal filling pressures and no residual severe CAD.     Since her last visit in April, she unfortunately has been having eye pain and blurry vision since her eye surgery. She continues with dizziness on occasion. She denies chest pain, dyspnea at rest, orthopnea, PND, edema or abdominal bloating. She has chronic back pain. No further syncopal episodes.      Past Medical History:   Diagnosis Date    AAA (abdominal aortic aneurysm) (CMS/[facility])     Arthritis     Elevated blood pressure reading without diagnosis of hypertension 4/19/2019    Epistaxis 1/6/2020    GERD (gastroesophageal reflux disease) 4/19/2019    Glaucoma 4/19/2019    Heart murmur     Hiatal hernia     History of hip replacement, total, right 4/19/2019    Right hip 9/ 2016 and revision 2017     History of rheumatic fever as a child 4/19/2019    Hypercholesterolemia 4/19/2019    Ischemic cardiomyopathy 5/9/2019    Kidney cysts     Osteoarthritis of multiple joints 4/19/2019    Osteoporosis     Pacemaker 12/9/2019    Raynaud's disease 4/19/2019    RSD (reflex sympathetic dystrophy) 4/19/2019    Of left foot    SOB (shortness of breath) 12/10/2019    Status post  insertion of drug eluting coronary artery stent 2019     Past Surgical History:   Procedure Laterality Date    CHOLECYSTECTOMY OPEN      CORONARY ANGIOPLASTY WITH STENT PLACEMENT  2019    of LAD    CORONARY ANGIOPLASTY WITH STENT PLACEMENT  2019    of RCA    DILATION AND CURETTAGE OF UTERUS      EYE SURGERY      RIGHT CATARACT    FOOT SURGERY Left     JOINT REPLACEMENT Right 2016    total hip    REVISION TOTAL HIP ARTHROPLASTY Right 2017    TONSILLECTOMY         SOCIAL HISTORY  Social History     Tobacco Use    Smoking status: Never Smoker    Smokeless tobacco: Never Used   Vaping Use    Vaping Use: Never used   Substance Use Topics    Alcohol use: Not Currently    Drug use: No     Family Status   Relation Name Status    Bio Mother   at age 66    Bio Father   at age 77        pulmonary fibrosis    MGM      MGF      PGM   at age 90        natural causes    PGF          ALLERGIES  Ace inhibitors, Atorvastatin, Brilinta [ticagrelor], Codeine, Dilaudid [hydromorphone], Morphine sulfate, Onion, and Oxycodone      Outpatient Encounter Medications as of 2022:     amoxicillin (AMOXIL) 500 mg capsule, Take 4 capsules (2,000 mg total) by mouth as needed (one hour prior to dental cleaning).    aspirin (ASPIR-81) 81 mg enteric coated tablet, Take 1 tablet (81 mg total) by mouth daily.    coenzyme Q10 (COQ10) 100 mg capsule, Take 1 capsule (100 mg total) by mouth daily.    cycloSPORINE (RESTASIS) 0.05 % ophthalmic emulsion, Administer 1 drop into both eyes 2 (two) times a day.    nitroglycerin (NITROSTAT) 0.4 mg SL tablet, Place 1 tablet (0.4 mg total) under the tongue every 5 (five) minutes as needed for chest pain.    pantoprazole (PROTONIX) 40 mg EC tablet, Take 40 mg by mouth daily.    rosuvastatin (CRESTOR) 20 mg tablet, TAKE 1 TABLET BY MOUTH EVERY DAY    ketorolac (ACULAR) 0.5 % ophthalmic solution, Administer 1 drop into  "the left eye 4 (four) times a day.    prednisoLONE acetate (PRED FORTE) 1 % ophthalmic suspension, Administer 1 drop into the left eye 4 (four) times a day.    valsartan (DIOVAN) 40 mg tablet, Take 1 tablet (40 mg total) by mouth daily. At bedtime (Patient not taking: Reported on 9/13/2022)    [DISCONTINUED] coenzyme Q10 (COQ10) 10 mg capsule, Take 1 capsule (10 mg total) by mouth daily.    ROS   As per HPI and otherwise negative.    Objective   Visit Vitals  /78   Pulse 76   Ht 1.702 m (5' 7\")   Wt 63.7 kg (140 lb 8 oz)   SpO2 98%   BMI 22.01 kg/m²       Wt Readings from Last 3 Encounters:   09/13/22 63.7 kg (140 lb 8 oz)   09/13/22 63.8 kg (140 lb 9.6 oz)   04/21/22 65.1 kg (143 lb 8 oz)       Physical Exam  Vitals reviewed.   Constitutional:       Appearance: She is well-developed.   HENT:      Head: Normocephalic.   Eyes:      General: No scleral icterus.  Neck:      Vascular: No JVD.   Cardiovascular:      Rate and Rhythm: Normal rate and regular rhythm.      Heart sounds: Normal heart sounds.   Pulmonary:      Breath sounds: Normal breath sounds.   Skin:     General: Skin is warm and dry.   Neurological:      Mental Status: She is alert and oriented to person, place, and time.   Psychiatric:         Judgment: Judgment normal.         Lab Results   Component Value Date    GLUCOSE 73 04/27/2022    CALCIUM 9.4 04/27/2022     04/27/2022    K 3.7 04/27/2022    CO2 27 04/27/2022     04/27/2022    BUN 15 04/27/2022    CREATININE 0.83 04/27/2022     Lab Results   Component Value Date    ALT 12 04/27/2022    AST 20 04/27/2022    ALKPHOS 75 04/27/2022    BILITOT 0.7 04/27/2022     Lab Results   Component Value Date    WBC 5.1 04/27/2022    HGB 12.7 04/27/2022    HCT 39.6 04/27/2022    MCV 83.2 04/27/2022     04/27/2022     Lab Results   Component Value Date    TSH 2.94 04/27/2022     Lab Results   Component Value Date    CHOL 156 02/04/2022    CHOL 161 12/10/2021    CHOL 162 02/08/2021 "     Lab Results   Component Value Date    HDL 71 02/04/2022    HDL 67 12/10/2021    HDL 68 02/08/2021     Lab Results   Component Value Date    LDLCALC 66 02/04/2022    LDLCALC 76 12/10/2021    LDLCALC 59 02/08/2021     Lab Results   Component Value Date    TRIG 107 02/04/2022    TRIG 93 12/10/2021    TRIG 177 (H) 02/08/2021     No results found for: CHOLHDL  No results found for: HGBA1C     EKG    Performed on 9/13/2022 and personally reviewed:  Sinus at 77 bpm   V-paced    Imaging    Echo 11/29/19:  Technically limited study no gross chamber enlargement  Imaging done just to exclude pericardial effusion Limited echo study  No regional wall motion abnormality preserved ejection fraction 55 to 60%  Aortic sclerosis  Mitral tricuspid valves appear normal  Prominent anterior fat pad no evidence of pericardial effusion or tamponade    CXR 1/17/2020:  No active disease.    PFTs 12/24/20:        LHC/RHC 2/12/21:  1. Normal right and left heart filling pressures (RA 3, PA 20/5 with mean of 11 mmHg, and PCW 6 mm Hg).  2. Patent prior stents in the mid and distal LAD.  3. 50% ostial Cx stenosis, not functionally significant by iFR (0.95). Patent proximal Cx stent.  4. Patent mid and distal RCA stents.    Echo 2/17/21:  · Left Ventricle: Normal ventricle size. Normal wall thickness. Mildly decreased systolic function. Estimated EF 45- 50%. No regional wall motion abnormalities. Grade I diastolic dysfunction. Diastolic inflow pattern consistent with impaired relaxation. Normal left atrial pressure.  · Aortic Valve: Tricuspid valve. Sclerotic leaflets.  · Sinuses of Valsalva normal-sized. Ascending aorta normal-sized.  · Mild mitral annular calcification.  · Left Atrium: Normal-sized atrium. Normal doppler flow of pulmonary veins.  · Right Ventricle: Normal ventricle size. Low normal systolic function. Pacer wire present. Normal wall thickness.  · Right Atrium: Normal-sized atrium. Pacer/ICD lead present.  · Tricuspid Valve:  Normal structure. Mild regurgitation. Estimated RVSP = 25 mmHg. The regurgitation jet is central. Normal hepatic vein systolic flow.  · IVC/SVC: Normal-sized IVC. IVC collapses >50% during inspiration. Normal hepatic vein flow.  · Pericardium: Normal structure.     CPT July 2021  CARDIOPULMONARY GAS EXCHANGE:  1. The test was near-maximal as defined by a respiratory exchange ratio of 1.10(normal >1.10).  2. The peak VO2 was 14.7 cc/kg/min which is 62% predicted (normal >85%).  3. The anaerobic threshold was 39% predicted (normal >40%).  4. The peak minute ventilation was 43L/min yielding a normal breathing reserve of 52% (normal >30%).  5. The VE/VCO2 slope was 43(normal <34).  6. Resting spirometry showed an FVC of 98%, FEV1 of 99% consistent with normal spirometry.     CONCLUSIONS:  1. By respiratory exchange ratio, the test was near maximal thus peak VO2 achieved may slightly underestimate her true exercise capacity.  2. Peak VO2 achieved was 14.7 cc/kg/min which is only 62% of predicted.  In combination with a low anaerobic threshold there is likely an element of deconditioning.  3. Her breathing reserve and spirometry suggest a cardiac rather than pulmonary limitation to exercise.  4. Suggest a regular exercise regimen to potentially increase functional capacity.     TTE 2-9-22  · Left Ventricle: Normal ventricle size. Mild concentric left ventricular hypertrophy. Mild-to-moderately decreased systolic function. Estimated EF 40- 45%. LV SVI low at 28 mL/m² per beat. Basal and mid inferolateral akinesis, mid and apical anteroseptal akinesisGrade I diastolic dysfunction. Diastolic inflow pattern consistent with impaired relaxation. Normal left atrial pressure.  · Tricuspid aortic valve. Sclerotic aortic valve leaflets.  · Aorta: Sinuses of Valsalva normal-sized.  · Normal leaflet structure of the mitral valve.  · Normal-sized LA. Doppler flow of pulmonary veins not obtained.  · Normal-sized RV. Mildly reduced RV  systolic function. Pacemaker/ICD lead present in the RV. Normal RV wall thickness. Mid free wall akinetic.  · Normal-sized RA. Pacemaker wire present in the RA.  · Tricuspid Valve: Normal structure. Trace regurgitation. Estimated RVSP = 27 mmHg.  · Pulmonic valve not well visualized.  · IVC collapses <50% during inspiration.  · Evidence of a prominent pericardial fat pad.    [employer] 3-11-22  1. This is a very technically difficult study.  Patient showed excessive motion during image acquisition, imaged with arms at sides, and diaphragmatic and GI interference.   2. Regadenoson technetium tetrofosmin shows a small, fixed defect in the apical to mid inferior wall, suggestive of tissue attenuation, although scar cannot be excluded. There is no myocardium at risk.   3. EKG is non-diagnostic due to ventricular pacing.  4. Gated SPECT imaging was unable to be obtained.   5. No prior study for comparison. Prior nuclear imaging was non-diagnostic, exercise testing.       ASSESSMENT AND PLAN    1. Chronic systolic and diastolic CHF, ACC Stage C, NYHA class 3  No extra volume on exam and a right heart cath showed low filling pressures, so diuretics are not indicated.  She is concerned about potential cost of Entresto and given the dizziness described I will avoid anything with a diuretic effect for now thus the decision to go with valsartan to start with as discussed at her last visit. She held of on starting this with dizziness concerns. Her blood pressure has been stable. We will recheck an echocardiogram at next visit.    2. CAD.  non-STEMI 4/26/19, three-vessel CAD; status post FRANK x 2 LAD 4/27/19 + FRANK x 4 RCA 4/29/19 + FRANK x 1 LCX OM2 5/2/19  She will continue aspirin. She also will continue statin therapy.  Dr. Moncada attempted to keep her on dual antiplatelet therapy given her numerous stents but ultimately could not be tolerated with incessant nosebleeds.  Thankfully this is resolved with switching to  monotherapy.    3. Dyslipidemia  She should continue with rosuvastatin therapy.    4. Discoloration of toes, concern for peripheral vascular disease  She describes what could be Raynaud's of her feet but does not get it in her hands.  She does have reduced pulses in her left foot.  I therefore have ordered ankle-brachial index and ultrasound to look at her arterial circulation. She was found to have small vessel disease.     5.  Abdominal aortic aneurysm.  2.9 cm on imaging in 2018.  This is being [facility] at [facility].      6.  Mobitz 2 AV block status post pacemaker 2019.  She follows with Dr. Carolina. No dysrhythmias found on interrogation today.    7. Dizziness  Likely related to her visual issues with blurry vision and eye pain. Carotid ultrasound without significant stenosis. Blood work has been unremarkable.          By signing my name below, I, Loretta Long, attest that this documentation has been prepared under the direction and in the presence of Jermaine Salter MD    Sincerely,  ARIAN Cadet  9/13/2022     Patient seen and examined.  I agree with the findings and recommendations of the scribed note above with my modifications. We reviewed notes ophthalmology and EP. We reviewed last labs. We ordered an echocardiogram to be done at next visit. We prescribed CO-Q-10 with her statin therapy.  Please not hesitate to contact me with any questions or concerns.    Sincerely,    Jermaine Salter MD  9/13/2022

## 2022-09-13 NOTE — ASSESSMENT & PLAN NOTE
She has a history of coronary artery disease with non-STEMI in April 2019 and is status post drug-eluting stent x2 to the LAD, drug-eluting stent x4 to the RCA, drug-eluting stent x1 to the left circumflex.  She is maintained on aspirin and statin therapy.  Plavix had to be stopped in the setting of severe nosebleeds.  She has no symptoms of angina.

## 2022-10-18 ENCOUNTER — HOSPITAL ENCOUNTER (OUTPATIENT)
Dept: CARDIOLOGY | Facility: CLINIC | Age: 77
Discharge: HOME | End: 2022-10-18
Attending: SURGERY
Payer: MEDICARE

## 2022-10-18 ENCOUNTER — OFFICE VISIT (OUTPATIENT)
Dept: VASCULAR SURGERY | Facility: CLINIC | Age: 77
End: 2022-10-18
Payer: MEDICARE

## 2022-10-18 VITALS
BODY MASS INDEX: 21.97 KG/M2 | SYSTOLIC BLOOD PRESSURE: 128 MMHG | WEIGHT: 140 LBS | HEIGHT: 67 IN | DIASTOLIC BLOOD PRESSURE: 78 MMHG

## 2022-10-18 VITALS — OXYGEN SATURATION: 97 % | DIASTOLIC BLOOD PRESSURE: 66 MMHG | HEART RATE: 60 BPM | SYSTOLIC BLOOD PRESSURE: 136 MMHG

## 2022-10-18 DIAGNOSIS — I71.40 ABDOMINAL AORTIC ANEURYSM (AAA) WITHOUT RUPTURE (CMS/HCC): ICD-10-CM

## 2022-10-18 DIAGNOSIS — I71.43 INFRARENAL ABDOMINAL AORTIC ANEURYSM (AAA) WITHOUT RUPTURE (CMS/HCC): Primary | ICD-10-CM

## 2022-10-18 LAB
ABDOMINAL DIST AORTA AP: 2.01 CM
ABDOMINAL DIST AORTA TRANS: 2.01 CM
ABDOMINAL DIST AORTA VEL: 32.9 CM/S
ABDOMINAL LT COM ILIAC AP: 1.11 CM
ABDOMINAL LT COM ILIAC TRANS: 1.17 CM
ABDOMINAL LT COM ILIAC VEL: 96.3 CM/S
ABDOMINAL MID AORTA AP: 2.64 CM
ABDOMINAL MID AORTA TRANS: 2.34 CM
ABDOMINAL MID AORTA VEL: 64.2 CM/S
ABDOMINAL PROX AORTA AP: 2.78 CM
ABDOMINAL PROX AORTA TRANS: 2.75 CM
ABDOMINAL PROX AORTA VEL: 53.5 CM/S
ABDOMINAL PROX/MID AORTA AP: 2.08 CM
ABDOMINAL PROX/MID AORTA PSV: 44.2 CM/S
ABDOMINAL PROX/MID AORTA TRANS: 2.03 CM
ABDOMINAL RT COM ILIAC AP: 1.13 CM
ABDOMINAL RT COM ILIAC TRANS: 1.14 CM
ABDOMINAL RT COM ILIAC VEL: 63.7 CM/S
BSA FOR ECHO PROCEDURE: 1.73 M2
LEFT COMMON ILIAC RATIO: 2.93
LT COM ILIAC DISTAL PSV: 84.3 CM/S
RIGHT COMMON ILIAC RATIO: 1.94
RT COM ILIAC DISTAL PSV: 78.6 CM/S

## 2022-10-18 PROCEDURE — 99214 OFFICE O/P EST MOD 30 MIN: CPT | Performed by: SURGERY

## 2022-10-18 PROCEDURE — 93978 VASCULAR STUDY: CPT | Performed by: SURGERY

## 2022-10-18 NOTE — PROGRESS NOTES
[facility]   [address] [place] [address] [place] [id number]  P: [phone]     F: [phone]       Vascular Surgery Follow up Visit    Subjective     Patient is a 77 y.o. female who has a past history of known small infrarenal abdominal aortic aneurysm.  Jennifer Sams was last seen on 10/21/2021.  Jennifer Sams presents at this time for follow-up evaluation.  Since her last visit to this office she has lost her partner.  She continues to be depressed with regards to this.  She is also been very wary of [employer] and [employer] violence.  She also complaining of numbness tingling and discomfort in her feet.    Review of Systems  Systemic: No fever, no chills, and no recent weight change. No headache.  Neck: No neck pain, no neck stiffness, and no lump or swelling in the neck.  Otolaryngeal: no hoarseness, no dysphagia.   Cardiovascular: No chest pain or discomfort, no palpitations.  Respiratory: No wheezing.  Gastrointestinal: Appetite without significant change. No dysphagia, no active abdominal pain, and no melena. No bright red blood per rectum.  Genitourinary: No hematuria, No genital lesion.  No obvious bladder changes.   Endocrine: No polydipsia and no excessive sweating.  Hematologic: No easy bleeding and no tendency for easy bruising.   Integumentary: No obvious masses  Musculoskeletal: Severe lower back discomforts.  Pain numbness and discoloration in both feet  Neurological: No new motor disturbances, or sensory disturbances.   Psychological: Appropriate.  Skin: No pruritus. No skin lesions and no rash  Blueness in the toes  Objective     Medical History:   Past Medical History:   Diagnosis Date    AAA (abdominal aortic aneurysm)     Arthritis     Elevated blood pressure reading without diagnosis of hypertension 4/19/2019    Epistaxis 1/6/2020    GERD (gastroesophageal reflux disease) 4/19/2019    Glaucoma 4/19/2019    Heart murmur     Hiatal hernia     History of hip  replacement, total, right 4/19/2019    Right hip 9/ 2016 and revision 2017     History of rheumatic fever as a child 4/19/2019    Hypercholesterolemia 4/19/2019    Ischemic cardiomyopathy 5/9/2019    Kidney cysts     Osteoarthritis of multiple joints 4/19/2019    Osteoporosis     Pacemaker 12/9/2019    Raynaud's disease 4/19/2019    RSD (reflex sympathetic dystrophy) 4/19/2019    Of left foot    SOB (shortness of breath) 12/10/2019    Status post insertion of drug eluting coronary artery stent 5/9/2019       Surgical History:   Past Surgical History:   Procedure Laterality Date    CHOLECYSTECTOMY OPEN      CORONARY ANGIOPLASTY WITH STENT PLACEMENT  04/29/2019    of LAD    CORONARY ANGIOPLASTY WITH STENT PLACEMENT  04/30/2019    of RCA    DILATION AND CURETTAGE OF UTERUS      EYE SURGERY      RIGHT CATARACT    FOOT SURGERY Left     JOINT REPLACEMENT Right 09/2016    total hip    REVISION TOTAL HIP ARTHROPLASTY Right 2017    TONSILLECTOMY         Social History:   Social History     Social History Narrative    Not on file       Family History:   Family History   Problem Relation Age of Onset    Congenital heart disease Biological Mother         noted at autopsy    Pulmonary fibrosis Biological Father     Heart attack Paternal Grandfather        Allergies: Ace inhibitors, Atorvastatin, Brilinta [ticagrelor], Codeine, Dilaudid [hydromorphone], Morphine sulfate, Onion, and Oxycodone    Current Medications:    amoxicillin    aspirin    coenzyme Q10    cycloSPORINE    ketorolac    nitroglycerin    pantoprazole    prednisoLONE acetate    rosuvastatin    valsartan    Vital Signs:  10/18/2022    Physical Exam     General appearance: alert, appears stated age and cooperative  Head: normocephalic, without obvious abnormality, atraumatic  Neck: supple, symmetrical, trachea midline.  There is no JVD.  Carotid arteries have good upstroke without carotid bruits.  There is no  lymphadenopathy.  Heart: Regular and rhythmic without murmur gallop.  Lungs: clear to auscultation bilaterally  Chest wall: no tenderness  Back: symmetric, no curvature. ROM normal. No CVA tenderness.  Complaining of pain with any mobilization  Abdomen: soft, non-tender; bowel sounds normal; no masses, no organomegaly.  No hernias.  No intra-abdominal bruits.  Extremities: extremities warm, distal cyanosis bilaterally, clubbing, or edema  Pulses: 2+ and symmetric.  Triphasic Doppler waveforms left posterior tibial and right dorsalis pedis  Skin: Skin color, texture, turgor normal. No rashes or lesions  Neurologic: Grossly normal    Labs/Imaging     Labs  No new labs.    Imaging  10/18/2022: Aortic duplex scan:  Stable 3.6 cm infrarenal abdominal aortic aneurysm      Assessment and Plan       Problem List Items Addressed This Visit        Circulatory    Abdominal aortic aneurysm (AAA) without rupture - Primary    Overview     2.9 cm- 2018         Current Assessment & Plan     77-year-old female with stable 3.6 cm infrarenal abdominal aortic aneurysm.    Her greatest complaint is her low back issues.  Recommendation: Follow-up evaluation of aneurysm in 2 years.  She is given information for evaluation by Dr. Mauro García MD, FACS  Chief, Division of Vascular Surgery  [facility]      Thank you very much for allowing us to participate in the care of your patient.  I will keep you informed of Jennifer Sams's care.  Please not hesitate to call or email if there are any questions.  I can be reached at [phone](mobile) or [email or web address].    Sincerely.    Davis García

## 2022-10-18 NOTE — LETTER
October 18, 2022     Sabrina Caruso MD  [address] [facility] [place] [address]    Patient: Jennifer Sams  YOB: 1945  Date of Visit: 10/18/2022      Dear Dr. Caruso:    Thank you for referring Jennifer Sams to me for evaluation. Below are my notes for this consultation.    If you have questions, please do not hesitate to call me. I look forward to following your patient along with you.         Sincerely,        Eliu García MD FACS        CC: MD Jermaine Wilcox MD Penny Vanderbeek, CRNP Stephen E Orlin, MD Susan A Gregory, MD Scott A Rushton, MD Uribe, Alexander, MD FACS  10/18/2022 10:40 AM  Sign when Signing Visit     [facility]   [address] [place] [address]  [place] [id number]  P: [phone]     F: [phone]       Vascular Surgery Follow up Visit    Subjective     Patient is a 77 y.o. female who has a past history of known small infrarenal abdominal aortic aneurysm.  Jennifer Sams was last seen on 10/21/2021.  Jennifer Sams presents at this time for follow-up evaluation.  Since her last visit to this office she has lost her partner.  She continues to be depressed with regards to this.  She is also been very wary of [place] violence.  She also complaining of numbness tingling and discomfort in her feet.    Review of Systems  Systemic: No fever, no chills, and no recent weight change. No headache.  Neck: No neck pain, no neck stiffness, and no lump or swelling in the neck.  Otolaryngeal: no hoarseness, no dysphagia.   Cardiovascular: No chest pain or discomfort, no palpitations.  Respiratory: No wheezing.  Gastrointestinal: Appetite without significant change. No dysphagia, no active abdominal pain, and no melena. No bright red blood per rectum.  Genitourinary: No hematuria, No genital lesion.  No obvious bladder changes.   Endocrine: No polydipsia and no excessive sweating.  Hematologic: No easy bleeding and no tendency for easy  bruising.   Integumentary: No obvious masses  Musculoskeletal: Severe lower back discomforts.  Pain numbness and discoloration in both feet  Neurological: No new motor disturbances, or sensory disturbances.   Psychological: Appropriate.  Skin: No pruritus. No skin lesions and no rash  Blueness in the toes  Objective     Medical History:   Past Medical History:   Diagnosis Date    AAA (abdominal aortic aneurysm)     Arthritis     Elevated blood pressure reading without diagnosis of hypertension 4/19/2019    Epistaxis 1/6/2020    GERD (gastroesophageal reflux disease) 4/19/2019    Glaucoma 4/19/2019    Heart murmur     Hiatal hernia     History of hip replacement, total, right 4/19/2019    Right hip 9/ 2016 and revision 2017     History of rheumatic fever as a child 4/19/2019    Hypercholesterolemia 4/19/2019    Ischemic cardiomyopathy 5/9/2019    Kidney cysts     Osteoarthritis of multiple joints 4/19/2019    Osteoporosis     Pacemaker 12/9/2019    Raynaud's disease 4/19/2019    RSD (reflex sympathetic dystrophy) 4/19/2019    Of left foot    SOB (shortness of breath) 12/10/2019    Status post insertion of drug eluting coronary artery stent 5/9/2019       Surgical History:   Past Surgical History:   Procedure Laterality Date    CHOLECYSTECTOMY OPEN      CORONARY ANGIOPLASTY WITH STENT PLACEMENT  04/29/2019    of LAD    CORONARY ANGIOPLASTY WITH STENT PLACEMENT  04/30/2019    of RCA    DILATION AND CURETTAGE OF UTERUS      EYE SURGERY      RIGHT CATARACT    FOOT SURGERY Left     JOINT REPLACEMENT Right 09/2016    total hip    REVISION TOTAL HIP ARTHROPLASTY Right 2017    TONSILLECTOMY         Social History:   Social History     Social History Narrative    Not on file       Family History:   Family History   Problem Relation Age of Onset    Congenital heart disease Biological Mother         noted at autopsy    Pulmonary fibrosis Biological Father     Heart attack Paternal Grandfather         Allergies: Ace inhibitors, Atorvastatin, Brilinta [ticagrelor], Codeine, Dilaudid [hydromorphone], Morphine sulfate, Onion, and Oxycodone    Current Medications:    amoxicillin    aspirin    coenzyme Q10    cycloSPORINE    ketorolac    nitroglycerin    pantoprazole    prednisoLONE acetate    rosuvastatin    valsartan    Vital Signs:  10/18/2022    Physical Exam     General appearance: alert, appears stated age and cooperative  Head: normocephalic, without obvious abnormality, atraumatic  Neck: supple, symmetrical, trachea midline.  There is no JVD.  Carotid arteries have good upstroke without carotid bruits.  There is no lymphadenopathy.  Heart: Regular and rhythmic without murmur gallop.  Lungs: clear to auscultation bilaterally  Chest wall: no tenderness  Back: symmetric, no curvature. ROM normal. No CVA tenderness.  Complaining of pain with any mobilization  Abdomen: soft, non-tender; bowel sounds normal; no masses, no organomegaly.  No hernias.  No intra-abdominal bruits.  Extremities: extremities warm, distal cyanosis bilaterally, clubbing, or edema  Pulses: 2+ and symmetric.  Triphasic Doppler waveforms left posterior tibial and right dorsalis pedis  Skin: Skin color, texture, turgor normal. No rashes or lesions  Neurologic: Grossly normal    Labs/Imaging     Labs  No new labs.    Imaging  10/18/2022: Aortic duplex scan:  Stable 3.6 cm infrarenal abdominal aortic aneurysm      Assessment and Plan       Problem List Items Addressed This Visit        Circulatory    Abdominal aortic aneurysm (AAA) without rupture - Primary    Overview     2.9 cm- 2018         Current Assessment & Plan     77-year-old female with stable 3.6 cm infrarenal abdominal aortic aneurysm.    Her greatest complaint is her low back issues.  Recommendation: Follow-up evaluation of aneurysm in 2 years.  She is given information for evaluation by Dr. Mauro García MD, FACS  Chief, Division of  Vascular Surgery  [facility]      Thank you very much for allowing us to participate in the care of your patient.  I will keep you informed of Jennifer Sams's care.  Please not hesitate to call or email if there are any questions.  I can be reached at [phone](mobile) or [email or web address].    Sincerely.    Davis García

## 2022-10-18 NOTE — ASSESSMENT & PLAN NOTE
77-year-old female with stable 3.6 cm infrarenal abdominal aortic aneurysm.    Her greatest complaint is her low back issues.  Recommendation: Follow-up evaluation of aneurysm in 2 years.  She is given information for evaluation by Dr. Mauro Levine

## 2022-10-20 ENCOUNTER — OFFICE VISIT (OUTPATIENT)
Dept: ORTHOPEDICS | Facility: CLINIC | Age: 77
End: 2022-10-20
Payer: MEDICARE

## 2022-10-20 ENCOUNTER — HOSPITAL ENCOUNTER (OUTPATIENT)
Dept: RADIOLOGY | Facility: HOSPITAL | Age: 77
Discharge: HOME | End: 2022-10-20
Attending: ORTHOPAEDIC SURGERY
Payer: MEDICARE

## 2022-10-20 VITALS — BODY MASS INDEX: 21.97 KG/M2 | HEIGHT: 67 IN | WEIGHT: 140 LBS

## 2022-10-20 DIAGNOSIS — M54.16 LUMBAR RADICULOPATHY: ICD-10-CM

## 2022-10-20 DIAGNOSIS — M51.369 LUMBAR DEGENERATIVE DISC DISEASE: Primary | ICD-10-CM

## 2022-10-20 DIAGNOSIS — M54.16 LUMBAR RADICULOPATHY: Primary | ICD-10-CM

## 2022-10-20 DIAGNOSIS — M47.816 LUMBAR FACET ARTHROPATHY: ICD-10-CM

## 2022-10-20 PROCEDURE — 99204 OFFICE O/P NEW MOD 45 MIN: CPT | Performed by: ORTHOPAEDIC SURGERY

## 2022-10-20 PROCEDURE — 72110 X-RAY EXAM L-2 SPINE 4/>VWS: CPT

## 2022-10-20 NOTE — PROGRESS NOTES
ORTHOSPINE H&P  Admitting Diagnosis:  No admission diagnoses are documented for this encounter.      CHIEF COMPLAINT : 1.  Low back pain    HPI :     Patient is a 77 y.o. female who presents with reports diffuse pain in the low back fairly constant.  As result of her response to pain she has become markedly deconditioned and inactive.    Currently denies any radicular pain has no neuropathic symptoms bowel or bladder complaints weakness or bilaterality.    Reports a aneurysm that is reportedly stable and requires no operative intervention.     Medical History:   Past Medical History:   Diagnosis Date    AAA (abdominal aortic aneurysm)     Arthritis     Elevated blood pressure reading without diagnosis of hypertension 4/19/2019    Epistaxis 1/6/2020    GERD (gastroesophageal reflux disease) 4/19/2019    Glaucoma 4/19/2019    Heart murmur     Hiatal hernia     History of hip replacement, total, right 4/19/2019    Right hip 9/ 2016 and revision 2017     History of rheumatic fever as a child 4/19/2019    Hypercholesterolemia 4/19/2019    Ischemic cardiomyopathy 5/9/2019    Kidney cysts     Osteoarthritis of multiple joints 4/19/2019    Osteoporosis     Pacemaker 12/9/2019    Raynaud's disease 4/19/2019    RSD (reflex sympathetic dystrophy) 4/19/2019    Of left foot    SOB (shortness of breath) 12/10/2019    Status post insertion of drug eluting coronary artery stent 5/9/2019       Surgical History:   Past Surgical History:   Procedure Laterality Date    CHOLECYSTECTOMY OPEN      CORONARY ANGIOPLASTY WITH STENT PLACEMENT  04/29/2019    of LAD    CORONARY ANGIOPLASTY WITH STENT PLACEMENT  04/30/2019    of RCA    DILATION AND CURETTAGE OF UTERUS      EYE SURGERY      RIGHT CATARACT    FOOT SURGERY Left     JOINT REPLACEMENT Right 09/2016    total hip    REVISION TOTAL HIP ARTHROPLASTY Right 2017    TONSILLECTOMY         Social History:   Social History     Social History Narrative    Not on  file       Family History:   Family History   Problem Relation Age of Onset    Congenital heart disease Biological Mother         noted at autopsy    Pulmonary fibrosis Biological Father     Heart attack Paternal Grandfather        Allergies: Ace inhibitors, Atorvastatin, Brilinta [ticagrelor], Codeine, Dilaudid [hydromorphone], Morphine sulfate, Onion, and Oxycodone       Medication List          Accurate as of October 20, 2022  2:53 PM. If you have any questions, ask your nurse or doctor.            CONTINUE taking these medications    amoxicillin 500 mg capsule  Commonly known as: AMOXIL  Take 4 capsules (2,000 mg total) by mouth as needed (one hour prior to dental cleaning).  Dose: 2,000 mg     aspirin 81 mg enteric coated tablet  Commonly known as: ASPIR-81  Take 1 tablet (81 mg total) by mouth daily.  Dose: 81 mg     coenzyme Q10 100 mg capsule  Commonly known as: COQ10  Take 1 capsule (100 mg total) by mouth daily.  Dose: 100 mg     cycloSPORINE 0.05 % ophthalmic emulsion  Commonly known as: RESTASIS  Administer 1 drop into both eyes 2 (two) times a day.  Dose: 1 drop     ketorolac 0.5 % ophthalmic solution  Commonly known as: ACULAR  Administer 1 drop into the left eye 4 (four) times a day.  Dose: 1 drop     nitroglycerin 0.4 mg SL tablet  Commonly known as: NITROSTAT  Place 1 tablet (0.4 mg total) under the tongue every 5 (five) minutes as needed for chest pain.  Dose: 0.4 mg     pantoprazole 40 mg EC tablet  Commonly known as: PROTONIX  Take 40 mg by mouth daily.  Dose: 40 mg     prednisoLONE acetate 1 % ophthalmic suspension  Commonly known as: PRED FORTE  Administer 1 drop into the left eye 4 (four) times a day.  Dose: 1 drop     rosuvastatin 20 mg tablet  Commonly known as: CRESTOR  TAKE 1 TABLET BY MOUTH EVERY DAY     valsartan 40 mg tablet  Commonly known as: DIOVAN  Take 1 tablet (40 mg total) by mouth daily. At bedtime  Dose: 40 mg          Review of Systems  All other systems reviewed and  "negative except as noted in the HPI.    Objective       Physical Exam :   Visit Vitals  Ht 1.702 m (5' 7\")   Wt 63.5 kg (140 lb)   BMI 21.93 kg/m²     Gait is nonantalgic no assistive ice penalize for ambulation.  Neurologic exam is nonfocal      Imaging : Personal review CT scan lumbar spine notes lumbar spondylosis with lumbar disc disease and lumbar facet arthropathy.  Diffuse osteopenia is noted with poor cortical margins along the vertebral bodies consistent with likely significant osteoporosis.    Multilevel lumbar facet arthropathy is noted no evidence of critical canal narrowing is appreciated.    CT scan report reviewed    IMPRESSION : 1.  Lumbar spondylosis with axial pain complaints    PLAN : Treatment options reviewed at this point I discussed that she is not a good candidate for spine surgery.  In fact, I have advised against spinal surgical intervention and have discussed conditioning, lower extremity strengthening, core strengthening exercises.    There is no indication based on her clinical complaints or imaging findings to suggest or support her surgical candidacy        Mauro Levine MD  10/20/2022  2:53 PM  "

## 2022-12-13 ENCOUNTER — TRANSCRIBE ORDERS (OUTPATIENT)
Dept: SCHEDULING | Age: 77
End: 2022-12-13

## 2022-12-13 DIAGNOSIS — R10.9 UNSPECIFIED ABDOMINAL PAIN: Primary | ICD-10-CM

## 2022-12-20 ENCOUNTER — TRANSCRIBE ORDERS (OUTPATIENT)
Dept: RADIOLOGY | Age: 77
End: 2022-12-20

## 2022-12-20 ENCOUNTER — HOSPITAL ENCOUNTER (OUTPATIENT)
Dept: RADIOLOGY | Age: 77
Discharge: HOME | End: 2022-12-20
Attending: UROLOGY
Payer: MEDICARE

## 2022-12-20 DIAGNOSIS — R10.9 UNSPECIFIED ABDOMINAL PAIN: ICD-10-CM

## 2022-12-20 DIAGNOSIS — R10.9 UNSPECIFIED ABDOMINAL PAIN: Primary | ICD-10-CM

## 2022-12-20 PROCEDURE — 74176 CT ABD & PELVIS W/O CONTRAST: CPT

## 2023-03-13 ASSESSMENT — ENCOUNTER SYMPTOMS
PSYCHIATRIC NEGATIVE: 1
CONSTITUTIONAL NEGATIVE: 1
GASTROINTESTINAL NEGATIVE: 1
NEUROLOGICAL NEGATIVE: 1
RESPIRATORY NEGATIVE: 1
ENDOCRINE NEGATIVE: 1
CARDIOVASCULAR NEGATIVE: 1
ALLERGIC/IMMUNOLOGIC NEGATIVE: 1
HEMATOLOGIC/LYMPHATIC NEGATIVE: 1
EYES NEGATIVE: 1

## 2023-03-13 NOTE — PROGRESS NOTES
Electrophysiology       Reason for visit: Follow-up   HPI   Jennifer Sams is a 78 y.o. female who comes to the office today for follow-up of her device and related arrhythmia issues.      She is status post dual-chamber pacemaker secondary to 2-1 AV block.  Her past medical history is significant for CAD requiring 8 stents including stents to her LAD, RCA, and circumflex and mild ischemic cardiomyopathy.    She has been feeling well and denies chest pain, shortness of breath, palpitations, lightness, dizziness, or syncope.  She reports ongoing worsening back pain and is interested in undergoing an MRI.      Past Medical History:   Diagnosis Date   • AAA (abdominal aortic aneurysm)    • Arthritis    • Elevated blood pressure reading without diagnosis of hypertension 4/19/2019   • Epistaxis 1/6/2020   • GERD (gastroesophageal reflux disease) 4/19/2019   • Glaucoma 4/19/2019   • Heart murmur    • Hiatal hernia    • History of hip replacement, total, right 4/19/2019    Right hip 9/ 2016 and revision 2017    • History of rheumatic fever as a child 4/19/2019   • Hypercholesterolemia 4/19/2019   • Ischemic cardiomyopathy 5/9/2019   • Kidney cysts    • Osteoarthritis of multiple joints 4/19/2019   • Osteoporosis    • Pacemaker 12/9/2019   • Raynaud's disease 4/19/2019   • RSD (reflex sympathetic dystrophy) 4/19/2019    Of left foot   • SOB (shortness of breath) 12/10/2019   • Status post insertion of drug eluting coronary artery stent 5/9/2019     Past Surgical History:   Procedure Laterality Date   • CHOLECYSTECTOMY OPEN     • CORONARY ANGIOPLASTY WITH STENT PLACEMENT  04/29/2019    of LAD   • CORONARY ANGIOPLASTY WITH STENT PLACEMENT  04/30/2019    of RCA   • DILATION AND CURETTAGE OF UTERUS     • EYE SURGERY      RIGHT CATARACT   • FOOT SURGERY Left    • JOINT REPLACEMENT Right 09/2016    total hip   • REVISION TOTAL HIP ARTHROPLASTY Right 2017   • TONSILLECTOMY       Allergies:  Ace inhibitors, Atorvastatin,  Brilinta [ticagrelor], Codeine, Dilaudid [hydromorphone], Morphine sulfate, Onion, and Oxycodone    Current Outpatient Medications on File Prior to Visit   Medication Sig Dispense Refill   • amoxicillin (AMOXIL) 500 mg capsule Take 4 capsules (2,000 mg total) by mouth as needed (one hour prior to dental cleaning). 12 capsule 3   • aspirin (ASPIR-81) 81 mg enteric coated tablet Take 1 tablet (81 mg total) by mouth daily. 90 tablet 1   • cycloSPORINE (RESTASIS) 0.05 % ophthalmic emulsion Administer 1 drop into both eyes 2 (two) times a day.     • nitroglycerin (NITROSTAT) 0.4 mg SL tablet Place 1 tablet (0.4 mg total) under the tongue every 5 (five) minutes as needed for chest pain. 25 tablet 4   • pantoprazole (PROTONIX) 40 mg EC tablet Take 40 mg by mouth daily.     • rosuvastatin (CRESTOR) 20 mg tablet TAKE 1 TABLET BY MOUTH EVERY DAY 90 tablet 3     No current facility-administered medications on file prior to visit.     Social History     Socioeconomic History   • Marital status: Single     Spouse name: None   • Number of children: None   • Years of education: None   • Highest education level: None   Tobacco Use   • Smoking status: Never   • Smokeless tobacco: Never   Vaping Use   • Vaping status: Never Used   Substance and Sexual Activity   • Alcohol use: Not Currently   • Drug use: No   • Sexual activity: Defer     Family History   Problem Relation Age of Onset   • Congenital heart disease Biological Mother         noted at autopsy   • Pulmonary fibrosis Biological Father    • Heart attack Paternal Grandfather      Review of Systems   Constitutional: Negative.    HENT: Negative.    Eyes: Negative.    Respiratory: Negative.    Cardiovascular: Negative.    Gastrointestinal: Negative.    Endocrine: Negative.    Genitourinary: Negative.    Musculoskeletal: Positive for back pain.   Skin: Negative.    Allergic/Immunologic: Negative.    Neurological: Negative.    Hematological: Negative.    Psychiatric/Behavioral:  Negative.      Vitals:    03/14/23 1351   BP: 130/82   Pulse: 66   Resp: 16     Wt Readings from Last 3 Encounters:   03/28/23 63.5 kg (140 lb)   03/28/23 64.2 kg (141 lb 8 oz)   03/14/23 63.5 kg (140 lb)     Physical Exam  Constitutional:       Appearance: Normal appearance. She is normal weight.   HENT:      Head: Normocephalic and atraumatic.   Cardiovascular:      Rate and Rhythm: Normal rate and regular rhythm.      Pulses: Normal pulses.      Heart sounds: Normal heart sounds.   Pulmonary:      Effort: Pulmonary effort is normal.      Breath sounds: Normal breath sounds.   Skin:     General: Skin is warm and dry.   Neurological:      Mental Status: She is alert and oriented to person, place, and time.        Lab Results   Component Value Date    WBC 5.1 04/27/2022    HGB 12.7 04/27/2022    HCT 39.6 04/27/2022     04/27/2022    CHOL 163 03/31/2023    TRIG 87 03/31/2023    HDL 80 03/31/2023    LDLCALC 66 03/31/2023    ALT 14 03/31/2023    AST 24 03/31/2023     03/31/2023    K 4.0 03/31/2023    CREATININE 0.83 03/31/2023    BUN 17 03/31/2023    TSH 2.94 04/27/2022    INR 1.0 11/18/2019       Cardiac Imaging    TRANSTHORACIC ECHO (TTE) COMPLETE 02/09/2022    Interpretation Summary  · Left Ventricle: Normal ventricle size. Mild concentric left ventricular hypertrophy. Mild-to-moderately decreased systolic function. Estimated EF 40- 45%. LV SVI low at 28 mL/m² per beat. Basal and mid inferolateral akinesis, mid and apical anteroseptal akinesisGrade I diastolic dysfunction. Diastolic inflow pattern consistent with impaired relaxation. Normal left atrial pressure.  · Tricuspid aortic valve. Sclerotic aortic valve leaflets.  · Aorta: Sinuses of Valsalva normal-sized.  · Normal leaflet structure of the mitral valve.  · Normal-sized LA. Doppler flow of pulmonary veins not obtained.  · Normal-sized RV. Mildly reduced RV systolic function. Pacemaker/ICD lead present in the RV. Normal RV wall thickness. Mid  free wall akinetic.  · Normal-sized RA. Pacemaker wire present in the RA.  · Tricuspid Valve: Normal structure. Trace regurgitation. Estimated RVSP = 27 mmHg.  · Pulmonic valve not well visualized.  · IVC collapses <50% during inspiration.  · Evidence of a prominent pericardial fat pad.    Limited study.  Tissue velocities not obtained.  Apical 2 chamber view not available.  Patient declined Definity.            ECG: Sinus rhythm with ventricular pacing    Other Cardiac Studies: As above     Ischemic cardiomyopathy  Her most recent echocardiogram in February 2022 demonstrated an EF of 40 to 45%.  She is euvolemic on exam today. She follows closely with Dr. Salter.     Pacemaker  Medtronic Rach XT DR MRI [id number] implanted on 11/18/2019.  Battery longevity estimated at 9.3 years until RRT.  Mode is DDD .  There is atrial pacing 39% of the time and V pacing 100% of the time there were no events on interrogation.  Atrial lead has a measured P wave of 2.1 mV, pacing threshold of 0.5 V at 0.4 ms, and a lead impedance of 551 ohms.  The RV lead is measured R wave of 11.1 mV, pacing threshold of 1.0 V 0.4 ms, and a lead impedance of 456 ohms.    Her device is MRI compatible.  If necessary, she can undergo an MRI.  There were no changes made to the permanent programming of her device.    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    Coronary artery disease involving native coronary artery of native heart with angina pectoris (CMS/HCC)  She has a history of coronary artery disease with non-STEMI in April 2019 and is status post drug-eluting stent x2 to the LAD, drug-eluting stent x4 to the RCA, drug-eluting stent x1 to the left circumflex.  She is maintained on aspirin and statin therapy.  Plavix had to be stopped in the setting of severe nosebleeds.  She has no symptoms of angina.          Jermaine Carolina MD  03/14/2023

## 2023-03-14 ENCOUNTER — OFFICE VISIT (OUTPATIENT)
Dept: CARDIOLOGY | Facility: CLINIC | Age: 78
End: 2023-03-14
Payer: MEDICARE

## 2023-03-14 VITALS
DIASTOLIC BLOOD PRESSURE: 82 MMHG | HEIGHT: 67 IN | RESPIRATION RATE: 16 BRPM | BODY MASS INDEX: 21.97 KG/M2 | SYSTOLIC BLOOD PRESSURE: 130 MMHG | HEART RATE: 66 BPM | WEIGHT: 140 LBS

## 2023-03-14 DIAGNOSIS — I25.119 CORONARY ARTERY DISEASE INVOLVING NATIVE CORONARY ARTERY OF NATIVE HEART WITH ANGINA PECTORIS (CMS/HCC): ICD-10-CM

## 2023-03-14 DIAGNOSIS — I25.5 ISCHEMIC CARDIOMYOPATHY: ICD-10-CM

## 2023-03-14 DIAGNOSIS — Z95.0 PACEMAKER: ICD-10-CM

## 2023-03-14 DIAGNOSIS — I44.1 MOBITZ TYPE 2 SECOND DEGREE ATRIOVENTRICULAR BLOCK: ICD-10-CM

## 2023-03-14 DIAGNOSIS — I25.10 CORONARY ARTERY DISEASE INVOLVING NATIVE CORONARY ARTERY OF NATIVE HEART WITHOUT ANGINA PECTORIS: Primary | ICD-10-CM

## 2023-03-14 PROCEDURE — 93000 ELECTROCARDIOGRAM COMPLETE: CPT | Performed by: INTERNAL MEDICINE

## 2023-03-14 PROCEDURE — 99214 OFFICE O/P EST MOD 30 MIN: CPT | Performed by: INTERNAL MEDICINE

## 2023-03-14 ASSESSMENT — ENCOUNTER SYMPTOMS: BACK PAIN: 1

## 2023-03-14 NOTE — ASSESSMENT & PLAN NOTE
Medtronic Roxton XT DR MRI [id number] implanted on 11/18/2019.  Battery longevity estimated at 9.3 years until RRT.  Mode is DDD .  There is atrial pacing 39% of the time and V pacing 100% of the time there were no events on interrogation.  Atrial lead has a measured P wave of 2.1 mV, pacing threshold of 0.5 V at 0.4 ms, and a lead impedance of 551 ohms.  The RV lead is measured R wave of 11.1 mV, pacing threshold of 1.0 V 0.4 ms, and a lead impedance of 456 ohms.    Her device is MRI compatible.  If necessary, she can undergo an MRI.  There were no changes made to the permanent programming of her device.

## 2023-03-24 ENCOUNTER — TELEPHONE (OUTPATIENT)
Dept: CARDIOLOGY | Facility: HOSPITAL | Age: 78
End: 2023-03-24
Payer: MEDICARE

## 2023-03-28 ENCOUNTER — HOSPITAL ENCOUNTER (OUTPATIENT)
Dept: CARDIOLOGY | Facility: HOSPITAL | Age: 78
Discharge: HOME | End: 2023-03-28
Attending: INTERNAL MEDICINE
Payer: MEDICARE

## 2023-03-28 ENCOUNTER — OFFICE VISIT (OUTPATIENT)
Dept: CARDIOLOGY | Facility: CLINIC | Age: 78
End: 2023-03-28
Payer: MEDICARE

## 2023-03-28 VITALS
HEIGHT: 67 IN | BODY MASS INDEX: 21.97 KG/M2 | DIASTOLIC BLOOD PRESSURE: 77 MMHG | SYSTOLIC BLOOD PRESSURE: 174 MMHG | WEIGHT: 140 LBS

## 2023-03-28 VITALS
SYSTOLIC BLOOD PRESSURE: 124 MMHG | HEIGHT: 67 IN | HEART RATE: 63 BPM | OXYGEN SATURATION: 99 % | DIASTOLIC BLOOD PRESSURE: 78 MMHG | BODY MASS INDEX: 22.21 KG/M2 | WEIGHT: 141.5 LBS

## 2023-03-28 DIAGNOSIS — I25.10 CORONARY ARTERY DISEASE INVOLVING NATIVE CORONARY ARTERY OF NATIVE HEART WITHOUT ANGINA PECTORIS: ICD-10-CM

## 2023-03-28 DIAGNOSIS — E78.2 MIXED HYPERLIPIDEMIA: ICD-10-CM

## 2023-03-28 DIAGNOSIS — I50.42 CHRONIC COMBINED SYSTOLIC AND DIASTOLIC HEART FAILURE (CMS/HCC): ICD-10-CM

## 2023-03-28 DIAGNOSIS — I44.1 MOBITZ TYPE 2 SECOND DEGREE ATRIOVENTRICULAR BLOCK: ICD-10-CM

## 2023-03-28 DIAGNOSIS — I50.42 CHRONIC COMBINED SYSTOLIC AND DIASTOLIC CHF (CONGESTIVE HEART FAILURE) (CMS/HCC): Primary | ICD-10-CM

## 2023-03-28 DIAGNOSIS — R73.03 PREDIABETES: ICD-10-CM

## 2023-03-28 DIAGNOSIS — I71.43 INFRARENAL ABDOMINAL AORTIC ANEURYSM (AAA) WITHOUT RUPTURE (CMS/HCC): ICD-10-CM

## 2023-03-28 LAB
AV PEAK GRADIENT: 9 MMHG
AV PEAK VELOCITY-S: 1.47 M/S
AV VALVE AREA: 1.18 CM2
BSA FOR ECHO PROCEDURE: 1.73 M2
DOP CALC LVOT STROKE VOLUME: 49.01 CM3
E WAVE DECELERATION TIME: 250 MS
E/A RATIO: 0.6
EDV (BP): 82 CM3
EF (A4C): 43.9 %
EF A2C: 59 %
EJECTION FRACTION: 50.2 %
EST RIGHT VENT SYSTOLIC PRESSURE BY TRICUSPID REGURGITATION JET: 26 MMHG
ESV (BP): 40.8 CM3
HEART RATE: 60 BPM
LAAS-AP4: 13.1 CM2
LAD 2D: 4.4 CM
LALD A4C: 5.22 CM
LEFT ATRIUM VOLUME INDEX: 15.14 CM3/M2
LEFT ATRIUM VOLUME: 26.2 CM3
LEFT VENTRICLE DIASTOLIC VOLUME INDEX: 49.25 CM3/M2
LEFT VENTRICLE DIASTOLIC VOLUME: 85.2 CM3
LEFT VENTRICLE SYSTOLIC VOLUME INDEX: 27.57 CM3/M2
LEFT VENTRICLE SYSTOLIC VOLUME: 47.7 CM3
LV DIASTOLIC VOLUME: 75.9 CM3
LV ESV (APICAL 2 CHAMBER): 31.1 CM3
LVAD-AP2: 29.4 CM2
LVAD-AP4: 30.6 CM2
LVAS-AP2: 16.6 CM2
LVAS-AP4: 22.2 CM2
LVEDVI(A2C): 43.87 CM3/M2
LVEDVI(BP): 47.4 CM3/M2
LVESVI(A2C): 17.98 CM3/M2
LVESVI(BP): 23.58 CM3/M2
LVLD-AP2: 9.12 CM
LVLD-AP4: 8.72 CM
LVLS-AP2: 7.09 CM
LVLS-AP4: 8.21 CM
LVOT 2D: 2.2 CM
LVOT A: 3.8 CM2
LVOT MG: 1 MMHG
LVOT MV: 0.42 M/S
LVOT PEAK VELOCITY: 0.58 M/S
LVOT PG: 1 MMHG
LVOT STROKE VOLUME INDEX: 28.33 ML/M2
LVOT VTI: 12.9 CM
MLH CV ECHO AVA INDEX VELOCITY RATIO: 0.7
MV PEAK A VEL: 0.74 M/S
MV PEAK E VEL: 0.44 M/S
RAP: 3 MMHG
TR MAX PG: 23.04 MMHG
TRICUSPID VALVE PEAK REGURGITATION VELOCITY: 2.4 M/S

## 2023-03-28 PROCEDURE — 99214 OFFICE O/P EST MOD 30 MIN: CPT | Performed by: INTERNAL MEDICINE

## 2023-03-28 PROCEDURE — 93306 TTE W/DOPPLER COMPLETE: CPT | Mod: 26 | Performed by: INTERNAL MEDICINE

## 2023-03-28 PROCEDURE — 93306 TTE W/DOPPLER COMPLETE: CPT

## 2023-03-28 NOTE — PROGRESS NOTES
Cardiology Note     5/3/2023 ADDENDUM: Preoperative Cardiovascular Risk Assessment for Lumbar Facet Block with MAC    Attention: Dr. Doshi    Acceptable cardiovascular risk for above mentioned procedure. She should not get propofol with her cardiomyopathy. She should continue on asiprin if able given CAD. She should continue on statin therapy throughout perioperative course. She does not require further cardiac testing.    Any questions or concerns, please contact our office.    Sincerely,     ARIAN Cadet        ----------------------------------------------------------------------------------------------------------------           HPI   Jennifer Sams is a 78 y.o. woman who presents for follow up for dilated cardiomyopathy, today, 3/28/2023.    As you know, she has a history of a mild ischemic cardiomyopathy with a very recent reassuring left and right heart cath, heart block with pacemaker placed 2019 and CAD who has had significant dyspnea on exertion. She was evaluated at the [facility], then here by Dr. Tidwell from a pulmonary standpoint but a pulmonary cause was not found and PFTs are normal. Dr. Carolina previously made pacemaker setting adjustments with regards to AV delay which helped somewhat initially but not completely. She previously followed with Dr. Courtney who retired, and now Dr. Moncada who did a left and right heart cath in February of 2021 found low-normal filling pressures and no residual severe CAD.     Since her last visit in September she reports reasonable cardiovascular stability.  However, she has been having a lot of back pain.  This limits her a lot more than her breathing.  She feels she gets short of breath slightly easier with walking but believes it secondary to back pain.  She also has a pulling sensation on the left side of her chest when she leans in certain directions on her bed but not otherwise with exertion.  She admits to a terrible diet and has no plans  to change that. She denies exertional chest pain, dyspnea at rest, orthopnea, PND, edema or abdominal bloating. She has chronic back pain. No further syncopal episodes.      Past Medical History:   Diagnosis Date   • AAA (abdominal aortic aneurysm)    • Arthritis    • Elevated blood pressure reading without diagnosis of hypertension 2019   • Epistaxis 2020   • GERD (gastroesophageal reflux disease) 2019   • Glaucoma 2019   • Heart murmur    • Hiatal hernia    • History of hip replacement, total, right 2019    Right hip 2016 and revision     • History of rheumatic fever as a child 2019   • Hypercholesterolemia 2019   • Ischemic cardiomyopathy 2019   • Kidney cysts    • Osteoarthritis of multiple joints 2019   • Osteoporosis    • Pacemaker 2019   • Raynaud's disease 2019   • RSD (reflex sympathetic dystrophy) 2019    Of left foot   • SOB (shortness of breath) 12/10/2019   • Status post insertion of drug eluting coronary artery stent 2019     Past Surgical History:   Procedure Laterality Date   • CHOLECYSTECTOMY OPEN     • CORONARY ANGIOPLASTY WITH STENT PLACEMENT  2019    of LAD   • CORONARY ANGIOPLASTY WITH STENT PLACEMENT  2019    of RCA   • DILATION AND CURETTAGE OF UTERUS     • EYE SURGERY      RIGHT CATARACT   • FOOT SURGERY Left    • JOINT REPLACEMENT Right 2016    total hip   • REVISION TOTAL HIP ARTHROPLASTY Right 2017   • TONSILLECTOMY         SOCIAL HISTORY  Social History     Tobacco Use   • Smoking status: Never   • Smokeless tobacco: Never   Vaping Use   • Vaping status: Never Used   Substance Use Topics   • Alcohol use: Not Currently   • Drug use: No     Family Status   Relation Name Status   • Bio Mother   at age 66   • Bio Father   at age 77        pulmonary fibrosis   • MGM     • MGF     • PGM   at age 90        natural causes   • PGF          ALLERGIES  Ace inhibitors,  "Atorvastatin, Brilinta [ticagrelor], Codeine, Dilaudid [hydromorphone], Morphine sulfate, Onion, and Oxycodone      Outpatient Encounter Medications as of 3/28/2023:   •  amoxicillin (AMOXIL) 500 mg capsule, Take 4 capsules (2,000 mg total) by mouth as needed (one hour prior to dental cleaning).  •  aspirin (ASPIR-81) 81 mg enteric coated tablet, Take 1 tablet (81 mg total) by mouth daily.  •  nitroglycerin (NITROSTAT) 0.4 mg SL tablet, Place 1 tablet (0.4 mg total) under the tongue every 5 (five) minutes as needed for chest pain.  •  pantoprazole (PROTONIX) 40 mg EC tablet, Take 40 mg by mouth daily.  •  rosuvastatin (CRESTOR) 20 mg tablet, TAKE 1 TABLET BY MOUTH EVERY DAY  •  cycloSPORINE (RESTASIS) 0.05 % ophthalmic emulsion, Administer 1 drop into both eyes 2 (two) times a day.    ROS   As per HPI and otherwise negative.  Additionally, cough, memory loss, arthritis and back pain.  Otherwise all relevant systems are reviewed and are negative.    Objective   Visit Vitals  /78   Pulse 63   Ht 1.702 m (5' 7\")   Wt 64.2 kg (141 lb 8 oz)   SpO2 99%   BMI 22.16 kg/m²       Wt Readings from Last 3 Encounters:   03/28/23 64.2 kg (141 lb 8 oz)   03/28/23 63.5 kg (140 lb)   03/14/23 63.5 kg (140 lb)       Physical Exam  Vitals reviewed.   Constitutional:       Appearance: She is well-developed.   HENT:      Head: Normocephalic.   Eyes:      General: No scleral icterus.  Neck:      Vascular: No JVD.   Cardiovascular:      Rate and Rhythm: Normal rate and regular rhythm.      Heart sounds: Normal heart sounds.   Pulmonary:      Breath sounds: Normal breath sounds.   Skin:     General: Skin is warm and dry.   Neurological:      Mental Status: She is alert and oriented to person, place, and time.   Psychiatric:         Judgment: Judgment normal.         Lab Results   Component Value Date    GLUCOSE 73 04/27/2022    CALCIUM 9.4 04/27/2022     04/27/2022    K 3.7 04/27/2022    CO2 27 04/27/2022     04/27/2022 "    BUN 15 04/27/2022    CREATININE 0.83 04/27/2022     Lab Results   Component Value Date    ALT 12 04/27/2022    AST 20 04/27/2022    ALKPHOS 75 04/27/2022    BILITOT 0.7 04/27/2022     Lab Results   Component Value Date    WBC 5.1 04/27/2022    HGB 12.7 04/27/2022    HCT 39.6 04/27/2022    MCV 83.2 04/27/2022     04/27/2022     Lab Results   Component Value Date    TSH 2.94 04/27/2022     Lab Results   Component Value Date    CHOL 156 02/04/2022    CHOL 161 12/10/2021    CHOL 162 02/08/2021     Lab Results   Component Value Date    HDL 71 02/04/2022    HDL 67 12/10/2021    HDL 68 02/08/2021     Lab Results   Component Value Date    LDLCALC 66 02/04/2022    LDLCALC 76 12/10/2021    LDLCALC 59 02/08/2021     Lab Results   Component Value Date    TRIG 107 02/04/2022    TRIG 93 12/10/2021    TRIG 177 (H) 02/08/2021     No results found for: CHOLHDL  No results found for: HGBA1C     EKG    Performed on 3/28/2023 and personally reviewed:  Sinus at 77 bpm   V-paced    Imaging    Echo 11/29/19:  Technically limited study no gross chamber enlargement  Imaging done just to exclude pericardial effusion Limited echo study  No regional wall motion abnormality preserved ejection fraction 55 to 60%  Aortic sclerosis  Mitral tricuspid valves appear normal  Prominent anterior fat pad no evidence of pericardial effusion or tamponade    CXR 1/17/2020:  No active disease.    PFTs 12/24/20:        LHC/RHC 2/12/21:  1. Normal right and left heart filling pressures (RA 3, PA 20/5 with mean of 11 mmHg, and PCW 6 mm Hg).  2. Patent prior stents in the mid and distal LAD.  3. 50% ostial Cx stenosis, not functionally significant by iFR (0.95). Patent proximal Cx stent.  4. Patent mid and distal RCA stents.    Echo 2/17/21:  · Left Ventricle: Normal ventricle size. Normal wall thickness. Mildly decreased systolic function. Estimated EF 45- 50%. No regional wall motion abnormalities. Grade I diastolic dysfunction. Diastolic inflow  pattern consistent with impaired relaxation. Normal left atrial pressure.  · Aortic Valve: Tricuspid valve. Sclerotic leaflets.  · Sinuses of Valsalva normal-sized. Ascending aorta normal-sized.  · Mild mitral annular calcification.  · Left Atrium: Normal-sized atrium. Normal doppler flow of pulmonary veins.  · Right Ventricle: Normal ventricle size. Low normal systolic function. Pacer wire present. Normal wall thickness.  · Right Atrium: Normal-sized atrium. Pacer/ICD lead present.  · Tricuspid Valve: Normal structure. Mild regurgitation. Estimated RVSP = 25 mmHg. The regurgitation jet is central. Normal hepatic vein systolic flow.  · IVC/SVC: Normal-sized IVC. IVC collapses >50% during inspiration. Normal hepatic vein flow.  · Pericardium: Normal structure.     CPT July 2021  CARDIOPULMONARY GAS EXCHANGE:  1. The test was near-maximal as defined by a respiratory exchange ratio of 1.10(normal >1.10).  2. The peak VO2 was 14.7 cc/kg/min which is 62% predicted (normal >85%).  3. The anaerobic threshold was 39% predicted (normal >40%).  4. The peak minute ventilation was 43L/min yielding a normal breathing reserve of 52% (normal >30%).  5. The VE/VCO2 slope was 43(normal <34).  6. Resting spirometry showed an FVC of 98%, FEV1 of 99% consistent with normal spirometry.     CONCLUSIONS:  1. By respiratory exchange ratio, the test was near maximal thus peak VO2 achieved may slightly underestimate her true exercise capacity.  2. Peak VO2 achieved was 14.7 cc/kg/min which is only 62% of predicted.  In combination with a low anaerobic threshold there is likely an element of deconditioning.  3. Her breathing reserve and spirometry suggest a cardiac rather than pulmonary limitation to exercise.  4. Suggest a regular exercise regimen to potentially increase functional capacity.     TTE 2-9-22  · Left Ventricle: Normal ventricle size. Mild concentric left ventricular hypertrophy. Mild-to-moderately decreased systolic function.  Estimated EF 40- 45%. LV SVI low at 28 mL/m² per beat. Basal and mid inferolateral akinesis, mid and apical anteroseptal akinesisGrade I diastolic dysfunction. Diastolic inflow pattern consistent with impaired relaxation. Normal left atrial pressure.  · Tricuspid aortic valve. Sclerotic aortic valve leaflets.  · Aorta: Sinuses of Valsalva normal-sized.  · Normal leaflet structure of the mitral valve.  · Normal-sized LA. Doppler flow of pulmonary veins not obtained.  · Normal-sized RV. Mildly reduced RV systolic function. Pacemaker/ICD lead present in the RV. Normal RV wall thickness. Mid free wall akinetic.  · Normal-sized RA. Pacemaker wire present in the RA.  · Tricuspid Valve: Normal structure. Trace regurgitation. Estimated RVSP = 27 mmHg.  · Pulmonic valve not well visualized.  · IVC collapses <50% during inspiration.  · Evidence of a prominent pericardial fat pad.    Timeline Labs / TLL 3-11-22  1. This is a very technically difficult study.  Patient showed excessive motion during image acquisition, imaged with arms at sides, and diaphragmatic and GI interference.   2. Regadenoson technetium tetrofosmin shows a small, fixed defect in the apical to mid inferior wall, suggestive of tissue attenuation, although scar cannot be excluded. There is no myocardium at risk.   3. EKG is non-diagnostic due to ventricular pacing.  4. Gated SPECT imaging was unable to be obtained.   5. No prior study for comparison. Prior nuclear imaging was non-diagnostic, exercise testing.     Echo 9/13/2022:  •  Left Ventricle: Normal ventricle size. Normal wall thickness. Mildly decreased systolic function. Estimated EF 45-50%. Basal and mid inferolateral akinesis, mid and apical anteroseptal/septal akinesis.  •  Aortic Valve: Valve not well seen but likely trileaflet.  Sclerotic leaflets. No regurgitation. No stenosis.  •  Aorta: Aortic root normal.  •  Right Ventricle: Ventricle not well visualized but probably normal size and at most mildly  dilated. Catheter present. Normal systolic function.  •  Tricuspid Valve: Mild regurgitation. Estimated RVSP = 26 mmHg.  •  Pericardium: Small anterior pericardial effusion  •  Left Atrium: Normal sized atrium.  •  Right Atrium: Normal sized atrium.  •  Mitral Valve: No regurgitation.  •  Pulmonic Valve: Valve not well visualized. No regurgitation.  •  IVC/SVC: Normal sized inferior vena cava. Inferior vena cava collapses >50% during inspiration.  •  Compared with February 2022, no significant change.  •  I personally reviewed results with her in the office today.     Abdominal US 10/18/2022:  • Aortic ectasia noted, measuring about 2.8 cm in maximal diameter    ASSESSMENT AND PLAN    1. Chronic systolic and diastolic CHF, ACC Stage C, NYHA class 3  No extra volume on exam and a right heart cath showed low filling pressures, so diuretics are not indicated.  She is concerned about potential cost of Entresto and given the dizziness described I will avoid anything with a diuretic effect for now thus the decision to go with valsartan to start. She held of on starting this with dizziness concerns. Her blood pressure has been stable.  Her echocardiogram is stable today.    2. CAD.  non-STEMI 4/26/19, three-vessel CAD; status post FRANK x 2 LAD 4/27/19 + FRANK x 4 RCA 4/29/19 + FRANK x 1 LCX OM2 5/2/19  She will continue aspirin. She also will continue statin therapy.  Dr. Moncada attempted to keep her on dual antiplatelet therapy given her numerous stents but ultimately could not be tolerated with incessant nosebleeds.  Thankfully this is resolved with switching to monotherapy.    3. Dyslipidemia  She should continue with rosuvastatin therapy.  She admits to a terrible diet as well as tells me she is not going to change that.  Fortunately her lipids are well controlled on rosuvastatin.    4. Discoloration of toes, concern for peripheral vascular disease  She describes what could be Raynaud's of her feet but does not get it in her  hands.  She does have reduced pulses in her left foot.  I therefore have ordered ankle-brachial index and ultrasound to look at her arterial circulation. She was found to have small vessel disease.     5.  Abdominal aortic aneurysm.  2.9 cm on imaging in 2018.  A follow-up ultrasound in the fall showed no significant growth.    6.  Mobitz 2 AV block status post pacemaker 2019.  She follows with Dr. Carolina. No dysrhythmias found on interrogation today.    7. Dizziness  Likely related to her visual issues with blurry vision and eye pain. Carotid ultrasound without significant stenosis. Blood work has been unremarkable.     Thank you for allowing me to participate in the care of this patient.  I reviewed interim records from her primary physician as well as electrophysiologist.  I reviewed her abdominal ultrasound.  I ordered labs.  We discussed her echocardiogram.  She declines HFrEF medical therapy for now.  I will plan to see her back in 6 months.  Please not hesitate to contact me with any questions or concerns.     Sincerely,    Jermaine Salter MD  3/28/2023

## 2023-03-28 NOTE — LETTER
March 28, 2023     Sabrina Caruso MD  [address] [facility] [address]    Patient: Jennifer Sams  YOB: 1945  Date of Visit: 3/28/2023      Dear Dr. Caruso:    Thank you for referring Jennifer Sams to me for evaluation. Below are my notes for this consultation.    If you have questions, please do not hesitate to call me. I look forward to following your patient along with you.         Sincerely,        Jermaine Salter MD        CC: MD Nayely Allen MD Domsky, Steven M, MD  3/28/2023  4:40 PM  Sign when Signing Visit     Cardiology Note          HPI   Jennifer Sams is a 78 y.o. woman who presents for follow up for dilated cardiomyopathy, today, 3/28/2023.    As you know, she has a history of a mild ischemic cardiomyopathy with a very recent reassuring left and right heart cath, heart block with pacemaker placed 2019 and CAD who has had significant dyspnea on exertion. She was evaluated at the [facility], then here by Dr. Tidwell from a pulmonary standpoint but a pulmonary cause was not found and PFTs are normal. Dr. Carolina previously made pacemaker setting adjustments with regards to AV delay which helped somewhat initially but not completely. She previously followed with Dr. Courtney who retired, and now Dr. Moncada who did a left and right heart cath in February of 2021 found low-normal filling pressures and no residual severe CAD.     Since her last visit in September she reports reasonable cardiovascular stability.  However, she has been having a lot of back pain.  This limits her a lot more than her breathing.  She feels she gets short of breath slightly easier with walking but believes it secondary to back pain.  She also has a pulling sensation on the left side of her chest when she leans in certain directions on her bed but not otherwise with exertion.  She admits to a terrible diet and has no plans to change that. She denies exertional chest pain,  dyspnea at rest, orthopnea, PND, edema or abdominal bloating. She has chronic back pain. No further syncopal episodes.      Past Medical History:   Diagnosis Date   • AAA (abdominal aortic aneurysm)    • Arthritis    • Elevated blood pressure reading without diagnosis of hypertension 2019   • Epistaxis 2020   • GERD (gastroesophageal reflux disease) 2019   • Glaucoma 2019   • Heart murmur    • Hiatal hernia    • History of hip replacement, total, right 2019    Right hip 2016 and revision 2017    • History of rheumatic fever as a child 2019   • Hypercholesterolemia 2019   • Ischemic cardiomyopathy 2019   • Kidney cysts    • Osteoarthritis of multiple joints 2019   • Osteoporosis    • Pacemaker 2019   • Raynaud's disease 2019   • RSD (reflex sympathetic dystrophy) 2019    Of left foot   • SOB (shortness of breath) 12/10/2019   • Status post insertion of drug eluting coronary artery stent 2019     Past Surgical History:   Procedure Laterality Date   • CHOLECYSTECTOMY OPEN     • CORONARY ANGIOPLASTY WITH STENT PLACEMENT  2019    of LAD   • CORONARY ANGIOPLASTY WITH STENT PLACEMENT  2019    of RCA   • DILATION AND CURETTAGE OF UTERUS     • EYE SURGERY      RIGHT CATARACT   • FOOT SURGERY Left    • JOINT REPLACEMENT Right 2016    total hip   • REVISION TOTAL HIP ARTHROPLASTY Right 2017   • TONSILLECTOMY         SOCIAL HISTORY  Social History     Tobacco Use   • Smoking status: Never   • Smokeless tobacco: Never   Vaping Use   • Vaping status: Never Used   Substance Use Topics   • Alcohol use: Not Currently   • Drug use: No     Family Status   Relation Name Status   • Bio Mother   at age 66   • Bio Father   at age 77        pulmonary fibrosis   • MGM     • MGF     • PGM   at age 90        natural causes   • PGF          ALLERGIES  Ace inhibitors, Atorvastatin, Brilinta [ticagrelor], Codeine,  "Dilaudid [hydromorphone], Morphine sulfate, Onion, and Oxycodone      Outpatient Encounter Medications as of 3/28/2023:   •  amoxicillin (AMOXIL) 500 mg capsule, Take 4 capsules (2,000 mg total) by mouth as needed (one hour prior to dental cleaning).  •  aspirin (ASPIR-81) 81 mg enteric coated tablet, Take 1 tablet (81 mg total) by mouth daily.  •  nitroglycerin (NITROSTAT) 0.4 mg SL tablet, Place 1 tablet (0.4 mg total) under the tongue every 5 (five) minutes as needed for chest pain.  •  pantoprazole (PROTONIX) 40 mg EC tablet, Take 40 mg by mouth daily.  •  rosuvastatin (CRESTOR) 20 mg tablet, TAKE 1 TABLET BY MOUTH EVERY DAY  •  cycloSPORINE (RESTASIS) 0.05 % ophthalmic emulsion, Administer 1 drop into both eyes 2 (two) times a day.    ROS   As per HPI and otherwise negative.  Additionally, cough, memory loss, arthritis and back pain.  Otherwise all relevant systems are reviewed and are negative.    Objective   Visit Vitals  /78   Pulse 63   Ht 1.702 m (5' 7\")   Wt 64.2 kg (141 lb 8 oz)   SpO2 99%   BMI 22.16 kg/m²       Wt Readings from Last 3 Encounters:   03/28/23 64.2 kg (141 lb 8 oz)   03/28/23 63.5 kg (140 lb)   03/14/23 63.5 kg (140 lb)       Physical Exam  Vitals reviewed.   Constitutional:       Appearance: She is well-developed.   HENT:      Head: Normocephalic.   Eyes:      General: No scleral icterus.  Neck:      Vascular: No JVD.   Cardiovascular:      Rate and Rhythm: Normal rate and regular rhythm.      Heart sounds: Normal heart sounds.   Pulmonary:      Breath sounds: Normal breath sounds.   Skin:     General: Skin is warm and dry.   Neurological:      Mental Status: She is alert and oriented to person, place, and time.   Psychiatric:         Judgment: Judgment normal.         Lab Results   Component Value Date    GLUCOSE 73 04/27/2022    CALCIUM 9.4 04/27/2022     04/27/2022    K 3.7 04/27/2022    CO2 27 04/27/2022     04/27/2022    BUN 15 04/27/2022    CREATININE 0.83 " 04/27/2022     Lab Results   Component Value Date    ALT 12 04/27/2022    AST 20 04/27/2022    ALKPHOS 75 04/27/2022    BILITOT 0.7 04/27/2022     Lab Results   Component Value Date    WBC 5.1 04/27/2022    HGB 12.7 04/27/2022    HCT 39.6 04/27/2022    MCV 83.2 04/27/2022     04/27/2022     Lab Results   Component Value Date    TSH 2.94 04/27/2022     Lab Results   Component Value Date    CHOL 156 02/04/2022    CHOL 161 12/10/2021    CHOL 162 02/08/2021     Lab Results   Component Value Date    HDL 71 02/04/2022    HDL 67 12/10/2021    HDL 68 02/08/2021     Lab Results   Component Value Date    LDLCALC 66 02/04/2022    LDLCALC 76 12/10/2021    LDLCALC 59 02/08/2021     Lab Results   Component Value Date    TRIG 107 02/04/2022    TRIG 93 12/10/2021    TRIG 177 (H) 02/08/2021     No results found for: CHOLHDL  No results found for: HGBA1C     EKG    Performed on 3/28/2023 and personally reviewed:  Sinus at 77 bpm   V-paced    Imaging    Echo 11/29/19:  Technically limited study no gross chamber enlargement  Imaging done just to exclude pericardial effusion Limited echo study  No regional wall motion abnormality preserved ejection fraction 55 to 60%  Aortic sclerosis  Mitral tricuspid valves appear normal  Prominent anterior fat pad no evidence of pericardial effusion or tamponade    CXR 1/17/2020:  No active disease.    PFTs 12/24/20:        LHC/RHC 2/12/21:  1. Normal right and left heart filling pressures (RA 3, PA 20/5 with mean of 11 mmHg, and PCW 6 mm Hg).  2. Patent prior stents in the mid and distal LAD.  3. 50% ostial Cx stenosis, not functionally significant by iFR (0.95). Patent proximal Cx stent.  4. Patent mid and distal RCA stents.    Echo 2/17/21:  Left Ventricle: Normal ventricle size. Normal wall thickness. Mildly decreased systolic function. Estimated EF 45- 50%. No regional wall motion abnormalities. Grade I diastolic dysfunction. Diastolic inflow pattern consistent with impaired relaxation.  Normal left atrial pressure.  Aortic Valve: Tricuspid valve. Sclerotic leaflets.  Sinuses of Valsalva normal-sized. Ascending aorta normal-sized.  Mild mitral annular calcification.  Left Atrium: Normal-sized atrium. Normal doppler flow of pulmonary veins.  Right Ventricle: Normal ventricle size. Low normal systolic function. Pacer wire present. Normal wall thickness.  Right Atrium: Normal-sized atrium. Pacer/ICD lead present.  Tricuspid Valve: Normal structure. Mild regurgitation. Estimated RVSP = 25 mmHg. The regurgitation jet is central. Normal hepatic vein systolic flow.  IVC/SVC: Normal-sized IVC. IVC collapses >50% during inspiration. Normal hepatic vein flow.  Pericardium: Normal structure.     CPT July 2021  CARDIOPULMONARY GAS EXCHANGE:  The test was near-maximal as defined by a respiratory exchange ratio of 1.10(normal >1.10).  The peak VO2 was 14.7 cc/kg/min which is 62% predicted (normal >85%).  The anaerobic threshold was 39% predicted (normal >40%).  The peak minute ventilation was 43L/min yielding a normal breathing reserve of 52% (normal >30%).  The VE/VCO2 slope was 43(normal <34).  Resting spirometry showed an FVC of 98%, FEV1 of 99% consistent with normal spirometry.     CONCLUSIONS:  By respiratory exchange ratio, the test was near maximal thus peak VO2 achieved may slightly underestimate her true exercise capacity.  Peak VO2 achieved was 14.7 cc/kg/min which is only 62% of predicted.  In combination with a low anaerobic threshold there is likely an element of deconditioning.  Her breathing reserve and spirometry suggest a cardiac rather than pulmonary limitation to exercise.  Suggest a regular exercise regimen to potentially increase functional capacity.     TTE 2-9-22  Left Ventricle: Normal ventricle size. Mild concentric left ventricular hypertrophy. Mild-to-moderately decreased systolic function. Estimated EF 40- 45%. LV SVI low at 28 mL/m² per beat. Basal and mid inferolateral akinesis, mid  and apical anteroseptal akinesisGrade I diastolic dysfunction. Diastolic inflow pattern consistent with impaired relaxation. Normal left atrial pressure.  Tricuspid aortic valve. Sclerotic aortic valve leaflets.  Aorta: Sinuses of Valsalva normal-sized.  Normal leaflet structure of the mitral valve.  Normal-sized LA. Doppler flow of pulmonary veins not obtained.  Normal-sized RV. Mildly reduced RV systolic function. Pacemaker/ICD lead present in the RV. Normal RV wall thickness. Mid free wall akinetic.  Normal-sized RA. Pacemaker wire present in the RA.  Tricuspid Valve: Normal structure. Trace regurgitation. Estimated RVSP = 27 mmHg.  Pulmonic valve not well visualized.  IVC collapses <50% during inspiration.  Evidence of a prominent pericardial fat pad.    Qwikwire 3-11-22  1. This is a very technically difficult study.  Patient showed excessive motion during image acquisition, imaged with arms at sides, and diaphragmatic and GI interference.   2. Regadenoson technetium tetrofosmin shows a small, fixed defect in the apical to mid inferior wall, suggestive of tissue attenuation, although scar cannot be excluded. There is no myocardium at risk.   3. EKG is non-diagnostic due to ventricular pacing.  4. Gated SPECT imaging was unable to be obtained.   5. No prior study for comparison. Prior nuclear imaging was non-diagnostic, exercise testing.     Echo 9/13/2022:  •  Left Ventricle: Normal ventricle size. Normal wall thickness. Mildly decreased systolic function. Estimated EF 45-50%. Basal and mid inferolateral akinesis, mid and apical anteroseptal/septal akinesis.  •  Aortic Valve: Valve not well seen but likely trileaflet.  Sclerotic leaflets. No regurgitation. No stenosis.  •  Aorta: Aortic root normal.  •  Right Ventricle: Ventricle not well visualized but probably normal size and at most mildly dilated. Catheter present. Normal systolic function.  •  Tricuspid Valve: Mild regurgitation. Estimated RVSP = 26  mmHg.  •  Pericardium: Small anterior pericardial effusion  •  Left Atrium: Normal sized atrium.  •  Right Atrium: Normal sized atrium.  •  Mitral Valve: No regurgitation.  •  Pulmonic Valve: Valve not well visualized. No regurgitation.  •  IVC/SVC: Normal sized inferior vena cava. Inferior vena cava collapses >50% during inspiration.  •  Compared with February 2022, no significant change.  •  I personally reviewed results with her in the office today.     Abdominal US 10/18/2022:  Aortic ectasia noted, measuring about 2.8 cm in maximal diameter    ASSESSMENT AND PLAN    1. Chronic systolic and diastolic CHF, ACC Stage C, NYHA class 3  No extra volume on exam and a right heart cath showed low filling pressures, so diuretics are not indicated.  She is concerned about potential cost of Entresto and given the dizziness described I will avoid anything with a diuretic effect for now thus the decision to go with valsartan to start. She held of on starting this with dizziness concerns. Her blood pressure has been stable.  Her echocardiogram is stable today.    2. CAD.  non-STEMI 4/26/19, three-vessel CAD; status post FRANK x 2 LAD 4/27/19 + FRANK x 4 RCA 4/29/19 + FRANK x 1 LCX OM2 5/2/19  She will continue aspirin. She also will continue statin therapy.  Dr. Moncada attempted to keep her on dual antiplatelet therapy given her numerous stents but ultimately could not be tolerated with incessant nosebleeds.  Thankfully this is resolved with switching to monotherapy.    3. Dyslipidemia  She should continue with rosuvastatin therapy.  She admits to a terrible diet as well as tells me she is not going to change that.  Fortunately her lipids are well controlled on rosuvastatin.    4. Discoloration of toes, concern for peripheral vascular disease  She describes what could be Raynaud's of her feet but does not get it in her hands.  She does have reduced pulses in her left foot.  I therefore have ordered ankle-brachial index and ultrasound  to look at her arterial circulation. She was found to have small vessel disease.     5.  Abdominal aortic aneurysm.  2.9 cm on imaging in 2018.  A follow-up ultrasound in the fall showed no significant growth.    6.  Mobitz 2 AV block status post pacemaker 2019.  She follows with Dr. Carolina. No dysrhythmias found on interrogation today.    7. Dizziness  Likely related to her visual issues with blurry vision and eye pain. Carotid ultrasound without significant stenosis. Blood work has been unremarkable.     Thank you for allowing me to participate in the care of this patient.  I reviewed interim records from her primary physician as well as electrophysiologist.  I reviewed her abdominal ultrasound.  I ordered labs.  We discussed her echocardiogram.  She declines HFrEF medical therapy for now.  I will plan to see her back in 6 months.  Please not hesitate to contact me with any questions or concerns.     Sincerely,    Jermaine Salter MD  3/28/2023

## 2023-04-01 LAB
ALBUMIN SERPL-MCNC: 4.1 G/DL (ref 3.6–5.1)
ALBUMIN/GLOB SERPL: 1.7 (CALC) (ref 1–2.5)
ALP SERPL-CCNC: 73 U/L (ref 37–153)
ALT SERPL-CCNC: 14 U/L (ref 6–29)
AST SERPL-CCNC: 24 U/L (ref 10–35)
BILIRUB SERPL-MCNC: 0.7 MG/DL (ref 0.2–1.2)
BUN SERPL-MCNC: 17 MG/DL (ref 7–25)
BUN/CREAT SERPL: NORMAL (CALC) (ref 6–22)
CALCIUM SERPL-MCNC: 9.2 MG/DL (ref 8.6–10.4)
CHLORIDE SERPL-SCNC: 108 MMOL/L (ref 98–110)
CHOLEST SERPL-MCNC: 163 MG/DL
CHOLEST/HDLC SERPL: 2 (CALC)
CO2 SERPL-SCNC: 27 MMOL/L (ref 20–32)
CREAT SERPL-MCNC: 0.83 MG/DL (ref 0.6–1)
EGFRCR SERPLBLD CKD-EPI 2021: 72 ML/MIN/1.73M2
GLOBULIN SER CALC-MCNC: 2.4 G/DL (CALC) (ref 1.9–3.7)
GLUCOSE SERPL-MCNC: 97 MG/DL (ref 65–99)
HBA1C MFR BLD: 5.5 % OF TOTAL HGB
HDLC SERPL-MCNC: 80 MG/DL
LDLC SERPL CALC-MCNC: 66 MG/DL (CALC)
MAGNESIUM SERPL-MCNC: 2.2 MG/DL (ref 1.5–2.5)
NONHDLC SERPL-MCNC: 83 MG/DL (CALC)
POTASSIUM SERPL-SCNC: 4 MMOL/L (ref 3.5–5.3)
PROT SERPL-MCNC: 6.5 G/DL (ref 6.1–8.1)
SODIUM SERPL-SCNC: 142 MMOL/L (ref 135–146)
TRIGL SERPL-MCNC: 87 MG/DL

## 2023-05-01 ENCOUNTER — TELEPHONE (OUTPATIENT)
Dept: SCHEDULING | Facility: CLINIC | Age: 78
End: 2023-05-01
Payer: MEDICARE

## 2023-05-01 NOTE — TELEPHONE ENCOUNTER
Please see below, I called tony and asked for a date for surgery, she said that they cannot schedule surgery without a clearance.

## 2023-05-01 NOTE — TELEPHONE ENCOUNTER
Nyla - Dr. Doshi's Office called states she sent a clearance requests and states requests was sent back stating date of the surgery is needed, Nyla states she is unable to schedule the surgery due to pt needing clearance first.     Nyla can be reached at [phone]

## 2023-06-26 ENCOUNTER — APPOINTMENT (EMERGENCY)
Dept: RADIOLOGY | Facility: HOSPITAL | Age: 78
DRG: 064 | End: 2023-06-26
Payer: MEDICARE

## 2023-06-26 ENCOUNTER — APPOINTMENT (EMERGENCY)
Dept: RADIOLOGY | Facility: HOSPITAL | Age: 78
DRG: 064 | End: 2023-06-26
Attending: INTERNAL MEDICINE
Payer: MEDICARE

## 2023-06-26 ENCOUNTER — APPOINTMENT (INPATIENT)
Dept: RADIOLOGY | Facility: HOSPITAL | Age: 78
DRG: 064 | End: 2023-06-26
Attending: HOSPITALIST
Payer: MEDICARE

## 2023-06-26 ENCOUNTER — HOSPITAL ENCOUNTER (INPATIENT)
Facility: HOSPITAL | Age: 78
LOS: 5 days | DRG: 064 | End: 2023-07-01
Attending: EMERGENCY MEDICINE | Admitting: HOSPITALIST
Payer: MEDICARE

## 2023-06-26 DIAGNOSIS — I63.9 CEREBROVASCULAR ACCIDENT (CVA), UNSPECIFIED MECHANISM (CMS/HCC): Primary | ICD-10-CM

## 2023-06-26 DIAGNOSIS — I25.119 CORONARY ARTERY DISEASE INVOLVING NATIVE CORONARY ARTERY OF NATIVE HEART WITH ANGINA PECTORIS (CMS/HCC): ICD-10-CM

## 2023-06-26 DIAGNOSIS — I21.4 NSTEMI (NON-ST ELEVATED MYOCARDIAL INFARCTION) (CMS/HCC): ICD-10-CM

## 2023-06-26 PROBLEM — I10 ELEVATED BLOOD PRESSURE READING WITH DIAGNOSIS OF HYPERTENSION: Status: ACTIVE | Noted: 2023-06-26

## 2023-06-26 LAB
ALBUMIN SERPL-MCNC: 4.6 G/DL (ref 3.4–5)
ALP SERPL-CCNC: 68 IU/L (ref 35–126)
ALT SERPL-CCNC: 16 IU/L (ref 11–54)
ANION GAP SERPL CALC-SCNC: 10 MEQ/L (ref 3–15)
APTT PPP: 25 SEC (ref 23–35)
AST SERPL-CCNC: 28 IU/L (ref 15–41)
BACTERIA URNS QL MICRO: ABNORMAL /HPF
BASOPHILS # BLD: 0.02 K/UL (ref 0.01–0.1)
BASOPHILS NFR BLD: 0.2 %
BILIRUB SERPL-MCNC: 1.6 MG/DL (ref 0.3–1.2)
BILIRUB UR QL STRIP.AUTO: NEGATIVE MG/DL
BUN SERPL-MCNC: 14 MG/DL (ref 8–20)
CALCIUM SERPL-MCNC: 9.7 MG/DL (ref 8.9–10.3)
CHLORIDE SERPL-SCNC: 111 MEQ/L (ref 98–109)
CHOLEST SERPL-MCNC: 178 MG/DL
CLARITY UR REFRACT.AUTO: CLEAR
CO2 SERPL-SCNC: 21 MEQ/L (ref 22–32)
COLOR UR AUTO: YELLOW
CREAT SERPL-MCNC: 0.8 MG/DL (ref 0.6–1.1)
DIFFERENTIAL METHOD BLD: ABNORMAL
EOSINOPHIL # BLD: 0.02 K/UL (ref 0.04–0.36)
EOSINOPHIL NFR BLD: 0.2 %
ERYTHROCYTE [DISTWIDTH] IN BLOOD BY AUTOMATED COUNT: 12.9 % (ref 11.7–14.4)
GFR SERPL CREATININE-BSD FRML MDRD: >60 ML/MIN/1.73M*2
GLUCOSE SERPL-MCNC: 117 MG/DL (ref 70–99)
GLUCOSE UR STRIP.AUTO-MCNC: NEGATIVE MG/DL
HCT VFR BLDCO AUTO: 47.3 % (ref 35–45)
HDLC SERPL-MCNC: 71 MG/DL
HDLC SERPL: 2.5 {RATIO}
HGB BLD-MCNC: 15.8 G/DL (ref 11.8–15.7)
HGB UR QL STRIP.AUTO: NEGATIVE
HYALINE CASTS #/AREA URNS LPF: ABNORMAL /LPF
IMM GRANULOCYTES # BLD AUTO: 0.03 K/UL (ref 0–0.08)
IMM GRANULOCYTES NFR BLD AUTO: 0.3 %
INR PPP: 1
KETONES UR STRIP.AUTO-MCNC: 2 MG/DL
LDLC SERPL CALC-MCNC: 88 MG/DL
LEUKOCYTE ESTERASE UR QL STRIP.AUTO: 1
LYMPHOCYTES # BLD: 1.56 K/UL (ref 1.2–3.5)
LYMPHOCYTES NFR BLD: 16.5 %
MAGNESIUM SERPL-MCNC: 2.1 MG/DL (ref 1.8–2.5)
MCH RBC QN AUTO: 30.6 PG (ref 28–33.2)
MCHC RBC AUTO-ENTMCNC: 33.4 G/DL (ref 32.2–35.5)
MCV RBC AUTO: 91.7 FL (ref 83–98)
MONOCYTES # BLD: 0.79 K/UL (ref 0.28–0.8)
MONOCYTES NFR BLD: 8.4 %
MUCOUS THREADS URNS QL MICRO: ABNORMAL /LPF
NEUTROPHILS # BLD: 7.02 K/UL (ref 1.7–7)
NEUTS SEG NFR BLD: 74.4 %
NITRITE UR QL STRIP.AUTO: NEGATIVE
NONHDLC SERPL-MCNC: 107 MG/DL
NRBC BLD-RTO: 0 %
PDW BLD AUTO: 8.7 FL (ref 9.4–12.3)
PH UR STRIP.AUTO: 6 [PH]
PLATELET # BLD AUTO: 177 K/UL (ref 150–369)
POTASSIUM SERPL-SCNC: 4.1 MEQ/L (ref 3.6–5.1)
PROT SERPL-MCNC: 7.9 G/DL (ref 6–8.2)
PROT UR QL STRIP.AUTO: 1
PROTHROMBIN TIME: 13.3 SEC (ref 12.2–14.5)
RBC # BLD AUTO: 5.16 M/UL (ref 3.93–5.22)
RBC #/AREA URNS HPF: ABNORMAL /HPF
SODIUM SERPL-SCNC: 142 MEQ/L (ref 136–144)
SP GR UR REFRACT.AUTO: >1.035
SQUAMOUS URNS QL MICRO: ABNORMAL /HPF
TRIGL SERPL-MCNC: 93 MG/DL (ref 30–149)
TROPONIN I SERPL HS-MCNC: 6.9 PG/ML
UROBILINOGEN UR STRIP-ACNC: 0.2 EU/DL
WBC # BLD AUTO: 9.44 K/UL (ref 3.8–10.5)
WBC #/AREA URNS HPF: ABNORMAL /HPF

## 2023-06-26 PROCEDURE — 4A03X5D MEASUREMENT OF ARTERIAL FLOW, INTRACRANIAL, EXTERNAL APPROACH: ICD-10-PCS | Performed by: RADIOLOGY

## 2023-06-26 PROCEDURE — 99285 EMERGENCY DEPT VISIT HI MDM: CPT | Mod: 25

## 2023-06-26 PROCEDURE — 63600000 HC DRUGS/DETAIL CODE: Mod: JZ | Performed by: INTERNAL MEDICINE

## 2023-06-26 PROCEDURE — 81001 URINALYSIS AUTO W/SCOPE: CPT

## 2023-06-26 PROCEDURE — G1004 CDSM NDSC: HCPCS

## 2023-06-26 PROCEDURE — 63700000 HC SELF-ADMINISTRABLE DRUG

## 2023-06-26 PROCEDURE — 93005 ELECTROCARDIOGRAM TRACING: CPT | Performed by: EMERGENCY MEDICINE

## 2023-06-26 PROCEDURE — 63600000 HC DRUGS/DETAIL CODE: Mod: JZ | Performed by: HOSPITALIST

## 2023-06-26 PROCEDURE — 71045 X-RAY EXAM CHEST 1 VIEW: CPT

## 2023-06-26 PROCEDURE — 70498 CT ANGIOGRAPHY NECK: CPT | Mod: ME

## 2023-06-26 PROCEDURE — 83036 HEMOGLOBIN GLYCOSYLATED A1C: CPT | Performed by: INTERNAL MEDICINE

## 2023-06-26 PROCEDURE — 70496 CT ANGIOGRAPHY HEAD: CPT | Mod: ME

## 2023-06-26 PROCEDURE — 85025 COMPLETE CBC W/AUTO DIFF WBC: CPT | Performed by: EMERGENCY MEDICINE

## 2023-06-26 PROCEDURE — 70450 CT HEAD/BRAIN W/O DYE: CPT | Mod: MG

## 2023-06-26 PROCEDURE — 85730 THROMBOPLASTIN TIME PARTIAL: CPT

## 2023-06-26 PROCEDURE — 96374 THER/PROPH/DIAG INJ IV PUSH: CPT | Mod: 59

## 2023-06-26 PROCEDURE — 25000000 HC PHARMACY GENERAL: Performed by: EMERGENCY MEDICINE

## 2023-06-26 PROCEDURE — 99291 CRITICAL CARE FIRST HOUR: CPT | Performed by: INTERNAL MEDICINE

## 2023-06-26 PROCEDURE — 99223 1ST HOSP IP/OBS HIGH 75: CPT | Performed by: HOSPITALIST

## 2023-06-26 PROCEDURE — 84484 ASSAY OF TROPONIN QUANT: CPT | Performed by: EMERGENCY MEDICINE

## 2023-06-26 PROCEDURE — 36415 COLL VENOUS BLD VENIPUNCTURE: CPT | Performed by: EMERGENCY MEDICINE

## 2023-06-26 PROCEDURE — 85610 PROTHROMBIN TIME: CPT

## 2023-06-26 PROCEDURE — 63600105 HC IODINE BASED CONTRAST: Mod: JZ | Performed by: INTERNAL MEDICINE

## 2023-06-26 PROCEDURE — 20600000 HC ROOM AND CARE INTERMEDIATE/TELEMETRY

## 2023-06-26 PROCEDURE — 80061 LIPID PANEL: CPT | Performed by: INTERNAL MEDICINE

## 2023-06-26 PROCEDURE — 80053 COMPREHEN METABOLIC PANEL: CPT | Performed by: EMERGENCY MEDICINE

## 2023-06-26 PROCEDURE — 83735 ASSAY OF MAGNESIUM: CPT | Performed by: EMERGENCY MEDICINE

## 2023-06-26 PROCEDURE — 25800000 HC PHARMACY IV SOLUTIONS: Performed by: HOSPITALIST

## 2023-06-26 RX ORDER — SENNOSIDES 8.6 MG/1
1 TABLET ORAL 2 TIMES DAILY PRN
Status: DISCONTINUED | OUTPATIENT
Start: 2023-06-26 | End: 2023-07-01 | Stop reason: HOSPADM

## 2023-06-26 RX ORDER — LORAZEPAM 2 MG/ML
1 INJECTION INTRAMUSCULAR ONCE
Status: COMPLETED | OUTPATIENT
Start: 2023-06-26 | End: 2023-06-26

## 2023-06-26 RX ORDER — ASPIRIN 300 MG/1
300 SUPPOSITORY RECTAL ONCE
Status: COMPLETED | OUTPATIENT
Start: 2023-06-26 | End: 2023-06-26

## 2023-06-26 RX ORDER — POTASSIUM CHLORIDE 14.9 MG/ML
20 INJECTION INTRAVENOUS AS NEEDED
Status: DISCONTINUED | OUTPATIENT
Start: 2023-06-26 | End: 2023-07-01 | Stop reason: HOSPADM

## 2023-06-26 RX ORDER — DEXTROSE 40 %
15-30 GEL (GRAM) ORAL AS NEEDED
Status: DISCONTINUED | OUTPATIENT
Start: 2023-06-26 | End: 2023-06-26

## 2023-06-26 RX ORDER — POTASSIUM CHLORIDE 750 MG/1
40 TABLET, EXTENDED RELEASE ORAL AS NEEDED
Status: DISCONTINUED | OUTPATIENT
Start: 2023-06-26 | End: 2023-07-01 | Stop reason: HOSPADM

## 2023-06-26 RX ORDER — ACETAMINOPHEN 325 MG/1
650 TABLET ORAL EVERY 6 HOURS PRN
Status: DISCONTINUED | OUTPATIENT
Start: 2023-06-26 | End: 2023-07-01 | Stop reason: HOSPADM

## 2023-06-26 RX ORDER — ONDANSETRON HYDROCHLORIDE 2 MG/ML
4 INJECTION, SOLUTION INTRAVENOUS EVERY 8 HOURS PRN
Status: DISCONTINUED | OUTPATIENT
Start: 2023-06-26 | End: 2023-06-26

## 2023-06-26 RX ORDER — DEXTROSE 50 % IN WATER (D50W) INTRAVENOUS SYRINGE
25 AS NEEDED
Status: DISCONTINUED | OUTPATIENT
Start: 2023-06-26 | End: 2023-06-26

## 2023-06-26 RX ORDER — ASPIRIN 81 MG/1
81 TABLET ORAL DAILY
Status: DISCONTINUED | OUTPATIENT
Start: 2023-06-26 | End: 2023-06-26

## 2023-06-26 RX ORDER — ASPIRIN 81 MG/1
81 TABLET ORAL DAILY
Status: DISCONTINUED | OUTPATIENT
Start: 2023-06-27 | End: 2023-06-30

## 2023-06-26 RX ORDER — ASPIRIN 300 MG/1
SUPPOSITORY RECTAL
Status: COMPLETED
Start: 2023-06-26 | End: 2023-06-26

## 2023-06-26 RX ORDER — SODIUM CHLORIDE 9 MG/ML
INJECTION, SOLUTION INTRAVENOUS CONTINUOUS
Status: DISCONTINUED | OUTPATIENT
Start: 2023-06-26 | End: 2023-06-30

## 2023-06-26 RX ORDER — DEXTROSE 50 % IN WATER (D50W) INTRAVENOUS SYRINGE
25 AS NEEDED
Status: DISCONTINUED | OUTPATIENT
Start: 2023-06-26 | End: 2023-07-01 | Stop reason: HOSPADM

## 2023-06-26 RX ORDER — DEXTROSE 40 %
15-30 GEL (GRAM) ORAL AS NEEDED
Status: DISCONTINUED | OUTPATIENT
Start: 2023-06-26 | End: 2023-07-01 | Stop reason: HOSPADM

## 2023-06-26 RX ORDER — LABETALOL HCL 20 MG/4 ML
10 SYRINGE (ML) INTRAVENOUS ONCE
Status: COMPLETED | OUTPATIENT
Start: 2023-06-26 | End: 2023-06-26

## 2023-06-26 RX ORDER — ONDANSETRON HYDROCHLORIDE 2 MG/ML
4 INJECTION, SOLUTION INTRAVENOUS EVERY 8 HOURS PRN
Status: DISCONTINUED | OUTPATIENT
Start: 2023-06-26 | End: 2023-07-01 | Stop reason: HOSPADM

## 2023-06-26 RX ORDER — POTASSIUM CHLORIDE 750 MG/1
20 TABLET, EXTENDED RELEASE ORAL AS NEEDED
Status: DISCONTINUED | OUTPATIENT
Start: 2023-06-26 | End: 2023-07-01 | Stop reason: HOSPADM

## 2023-06-26 RX ORDER — SENNOSIDES 8.6 MG/1
1 TABLET ORAL 2 TIMES DAILY PRN
Status: DISCONTINUED | OUTPATIENT
Start: 2023-06-26 | End: 2023-06-26

## 2023-06-26 RX ORDER — HYDRALAZINE HYDROCHLORIDE 20 MG/ML
10 INJECTION INTRAMUSCULAR; INTRAVENOUS EVERY 4 HOURS PRN
Status: DISCONTINUED | OUTPATIENT
Start: 2023-06-26 | End: 2023-07-01 | Stop reason: HOSPADM

## 2023-06-26 RX ORDER — IBUPROFEN 200 MG
16-32 TABLET ORAL AS NEEDED
Status: DISCONTINUED | OUTPATIENT
Start: 2023-06-26 | End: 2023-07-01 | Stop reason: HOSPADM

## 2023-06-26 RX ORDER — IBUPROFEN 200 MG
16-32 TABLET ORAL AS NEEDED
Status: DISCONTINUED | OUTPATIENT
Start: 2023-06-26 | End: 2023-06-26

## 2023-06-26 RX ORDER — LORAZEPAM 2 MG/ML
1 INJECTION INTRAMUSCULAR
Status: DISCONTINUED | OUTPATIENT
Start: 2023-06-26 | End: 2023-07-01 | Stop reason: HOSPADM

## 2023-06-26 RX ADMIN — LORAZEPAM 1 MG: 2 INJECTION INTRAMUSCULAR; INTRAVENOUS at 15:41

## 2023-06-26 RX ADMIN — ASPIRIN 300 MG: 300 SUPPOSITORY RECTAL at 17:06

## 2023-06-26 RX ADMIN — SODIUM CHLORIDE: 9 INJECTION, SOLUTION INTRAVENOUS at 17:09

## 2023-06-26 RX ADMIN — LABETALOL HYDROCHLORIDE 10 MG: 5 INJECTION, SOLUTION INTRAVENOUS at 17:06

## 2023-06-26 RX ADMIN — LORAZEPAM 1 MG: 2 INJECTION INTRAMUSCULAR; INTRAVENOUS at 15:35

## 2023-06-26 RX ADMIN — IOHEXOL 130 ML: 350 INJECTION, SOLUTION INTRAVENOUS at 15:42

## 2023-06-26 RX ADMIN — HYDRALAZINE HYDROCHLORIDE 10 MG: 20 INJECTION INTRAMUSCULAR; INTRAVENOUS at 19:22

## 2023-06-26 ASSESSMENT — ENCOUNTER SYMPTOMS
FACIAL ASYMMETRY: 1
WEAKNESS: 1
HEADACHES: 0
SHORTNESS OF BREATH: 0
VOMITING: 0
CONFUSION: 1

## 2023-06-26 ASSESSMENT — COGNITIVE AND FUNCTIONAL STATUS - GENERAL
MOVING TO AND FROM BED TO CHAIR: 3 - A LITTLE
STANDING UP FROM CHAIR USING ARMS: 3 - A LITTLE
WALKING IN HOSPITAL ROOM: 3 - A LITTLE
CLIMB 3 TO 5 STEPS WITH RAILING: 3 - A LITTLE

## 2023-06-26 NOTE — ASSESSMENT & PLAN NOTE
Monitor on telemetry  N.p.o. except medication  Trend troponin  Repeat ECG  Received 325 mg ASA, will continue 81 mg daily  Cardiology consult

## 2023-06-26 NOTE — ED PROVIDER NOTES
Emergency Medicine Note  HPI   HISTORY OF PRESENT ILLNESS     Patient is a 78-year-old female with past medical history of aortic aneurysm, arthritis, epistaxis, GERD, glaucoma, heart murmur, hiatal hernia, hypercholesteremia, ischemic cardiomyopathy, kidney cyst, arthritis, osteoporosis, Raynaud's disease, and reflex sympathetic dystrophy who arrives to the emergency department for right-sided facial weakness and right upper extremity weakness.  Last known well was Saturday morning.  Patient brought in by EMS.  Per EMS patient is frequently checked on by a friend who states she has not seen or heard from patient since Saturday morning.  Per EMS and friend patient was not acting appropriate on arrival.  Per EMS patient was running around the house and had difficulty following directions.  Patient is without on exam.  On exam patient has right upper extremity weakness but full motor to bilateral lower extremities.  Patient has a positive right facial droop.  Patient states she can understand this but is unable to communicate however patient was not able to identify images via pointing to objects during NIH scale exam.  History and physical limited due to patient's condition and nonverbal status.        History provided by:  EMS personnel  History limited by:  Patient nonverbal, mental status change and acuity of condition  Stroke  Presenting symptoms: confusion and weakness (Right upper extremity weakness)    Presenting symptoms: no headaches    Associated symptoms: no chest pain and no vomiting          Patient History   PAST HISTORY     Reviewed from Nursing Triage:       Past Medical History:   Diagnosis Date   • AAA (abdominal aortic aneurysm)    • Arthritis    • Elevated blood pressure reading without diagnosis of hypertension 4/19/2019   • Epistaxis 1/6/2020   • GERD (gastroesophageal reflux disease) 4/19/2019   • Glaucoma 4/19/2019   • Heart murmur    • Hiatal hernia    • History of hip replacement, total,  right 4/19/2019    Right hip 9/ 2016 and revision 2017    • History of rheumatic fever as a child 4/19/2019   • Hypercholesterolemia 4/19/2019   • Ischemic cardiomyopathy 5/9/2019   • Kidney cysts    • Osteoarthritis of multiple joints 4/19/2019   • Osteoporosis    • Pacemaker 12/9/2019   • Raynaud's disease 4/19/2019   • RSD (reflex sympathetic dystrophy) 4/19/2019    Of left foot   • SOB (shortness of breath) 12/10/2019   • Status post insertion of drug eluting coronary artery stent 5/9/2019       Past Surgical History:   Procedure Laterality Date   • CHOLECYSTECTOMY OPEN     • CORONARY ANGIOPLASTY WITH STENT PLACEMENT  04/29/2019    of LAD   • CORONARY ANGIOPLASTY WITH STENT PLACEMENT  04/30/2019    of RCA   • DILATION AND CURETTAGE OF UTERUS     • EYE SURGERY      RIGHT CATARACT   • FOOT SURGERY Left    • JOINT REPLACEMENT Right 09/2016    total hip   • REVISION TOTAL HIP ARTHROPLASTY Right 2017   • TONSILLECTOMY         Family History   Problem Relation Age of Onset   • Congenital heart disease Biological Mother         noted at autopsy   • Pulmonary fibrosis Biological Father    • Heart attack Paternal Grandfather        Social History     Tobacco Use   • Smoking status: Never   • Smokeless tobacco: Never   Vaping Use   • Vaping Use: Never used   Substance Use Topics   • Alcohol use: Not Currently   • Drug use: No         Review of Systems   REVIEW OF SYSTEMS     Review of Systems   Respiratory: Negative for shortness of breath.    Cardiovascular: Negative for chest pain.   Gastrointestinal: Negative for vomiting.   Musculoskeletal: Negative for gait problem.   Neurological: Positive for facial asymmetry (Right-sided facial weakness) and weakness (Right upper extremity weakness). Negative for headaches.   Psychiatric/Behavioral: Positive for confusion.         VITALS     ED Vitals    Date/Time Temp Pulse Resp BP SpO2 [facility]   06/26/23 1519 36.8 °C (98.3 °F) 85 18 177/98 95 % ESH        Pulse Ox %: 95 % (06/26/23  1529)  Pulse Ox Interpretation: Normal (06/26/23 1529)  Heart Rate: 85 (06/26/23 1529)  Rhythm Strip Interpretation: Normal Sinus Rhythm (06/26/23 1529)     Physical Exam   PHYSICAL EXAM     Physical Exam  HENT:      Head: Normocephalic and atraumatic.   Cardiovascular:      Rate and Rhythm: Normal rate and regular rhythm.      Pulses: Normal pulses.      Heart sounds: Normal heart sounds.   Pulmonary:      Effort: Pulmonary effort is normal.      Breath sounds: Normal breath sounds.   Musculoskeletal:      Cervical back: Normal range of motion and neck supple.   Skin:     General: Skin is warm and dry.   Neurological:      Mental Status: She is alert.      Cranial Nerves: Dysarthria and facial asymmetry (Right-sided facial weakness) present.      Motor: Weakness (Right-sided facial and right upper extremity weakness) present.      Gait: Gait normal.           PROCEDURES     Procedures     DATA     Results     None          Imaging Results    None         ECG 12 lead    (Results Pending)       Scoring tools                                  ED Course & MDM   MDM / ED COURSE / CLINICAL IMPRESSION / DISPO     Medical Decision Making  Impression: Patient is alert and anxious on exam.  Patient presents to the ED for right-sided facial weakness and right upper extremity weakness.  Last known well was Saturday morning.  Patient brought in by EMS.  Per EMS patient is frequently checked on by a friend who states she has not seen or heard from patient since Saturday morning.  Per EMS and friend patient was not acting appropriate on arrival.  Per EMS patient was running around the house and had difficulty following directions.  Patient is without on exam.  On exam patient has right upper extremity weakness but full motor to bilateral lower extremities.  Patient has a positive right facial droop.  Patient states she can understand this but is unable to communicate however patient was not able to identify images via pointing to  objects during NIH scale exam.  History and physical limited due to patient's condition.  NIH scale 13.  PERRLA.  +2 radial and distal pulses.  Less than 2-second cap refill.  Lungs are clear in all fields.  Bowel sounds present all 4 quadrants.  Patient is afebrile on exam.  VSS with SPO2 of 95% on room air.  Troponin 6.9.  Magnesium 2.1.  Chloride 111, CO2 21, glucose 117, bilirubin 1.6.  Hemoglobin 15.8, hematocrit 47.3.  CT head IMPRESSION:  1. Findings highly concerning for an acute infarct in the posterior aspect of  the left MCA territory with an associated thrombosed vessel.  2. Chronic microangiopathy and involutional changes.  Pain: CBC, CMP, troponin, CXR, point-of-care glucose, PT/INR, UA, CT head, neuro consult, reassess  [place] [facility] [place]      Amount and/or Complexity of Data Reviewed  Labs: ordered. Decision-making details documented in ED Course.  Radiology: ordered.  ECG/medicine tests: ordered.      Risk  Prescription drug management.  Decision regarding hospitalization.          ED Course as of 06/26/23 1554   Mon Jun 26, 2023   1517 Patient evaluated for right-sided facial weakness and right upper extremity weakness.  Last known well was Saturday morning.  Patient brought in by EMS.  Per EMS patient is frequently checked on by a friend who states she has not seen or heard from patient since Saturday morning.  Per EMS and friend patient was not acting appropriate on arrival.  Per EMS patient was running around the house and had difficulty following directions.  Patient is without on exam.  On exam patient has right upper extremity weakness but full motor to bilateral lower extremities.  Patient has a positive right facial droop.  Patient states she can understand this but is unable to communicate however patient was not able to identify images via pointing to objects during NIH scale exam. [JG]   1539 Hemoglobin(!): 15.8 [JG]   1539 MPV(!): 8.7 [JG]   1540 Hematocrit(!): 47.3 [JG]   1553 Head  CT shows completed left MCA infarct with a small amount of hemorrhage.  Patient was seen by Dr. Aparicio.  He is recommending admission to the PCU.  [facility] was paged. [SS]   1080 Patient is not a tPA candidate as her last known well was 48 hours ago and she has some hemorrhage. [SS]      ED Course User Index  [JG] Devendra Washington CRNP  [SS] Concha Jordan MD     Clinical Impression      None               Devendra Washington CRNP  06/26/23 1208

## 2023-06-26 NOTE — ASSESSMENT & PLAN NOTE
Brain CT x3: Left MCA territory Ischemia. No Hemorrhage    Monitor on PCU  Aphasic and right-sided weakness  Neurochecks/NIH  No MRI brain, MRA head/neck ordered Due to PPM  Obtain ECHO  Trend troponin  Stabilizing BP  Check Lipid panel, A1c  Received 325 mg ASA, will continue 81 mg daily  Continue home regimen   IV Hydralazine SBP > 180  Neurology consult Dr. Aparicio saw her , appreciated.

## 2023-06-26 NOTE — ASSESSMENT & PLAN NOTE
hold home meds  Fall precautions  Aspiration precautions  Consult pt/ot  Consult case mgt  bp parameters for hypotension  dvt prophylaxis with  scd  Pt is full code  Spent 55 minutes assessing planning for patient care    6/27  Stable today

## 2023-06-26 NOTE — CONSULTS
Neurology Inpatient Consult    PATIENT NAME:  Jennifer Sams  MRN:  [id number]  DATE OF SERVICE:  6/26/2023    REASON FOR CONSULTATION/CHIEF COMPLAINT: Right-sided weakness    HISTORY OF PRESENT ILLNESS    78-year-old female with past medical history of ischemic cardiomyopathy, CAD, heart block s/p PPM 2019 brought to the hospital for right-sided weakness.    History obtained from chart review and EMS.  Patient unable to provide history due to aphasia.  Per EMS, patient lives alone and was last seen normal by her friend about 3 days ago.  No one heard from the patient for the last 2 to 3 days , so friend did a well check.  Patient appeared confused and combative so EMS was called.  Patient continued to be combative for EMS and needed a bearhug to be brought to the hospital.  On arrival, the patient continued to have right-sided weakness and aphasia.      REVIEW OF SYSTEMS:  Except as mentioned in the HPI, review of systems is essentially negative for the 10 major systems.     PAST MEDICAL HISTORY:    Past Medical History:   Diagnosis Date   • AAA (abdominal aortic aneurysm)    • Arthritis    • Elevated blood pressure reading without diagnosis of hypertension 4/19/2019   • Epistaxis 1/6/2020   • GERD (gastroesophageal reflux disease) 4/19/2019   • Glaucoma 4/19/2019   • Heart murmur    • Hiatal hernia    • History of hip replacement, total, right 4/19/2019    Right hip 9/ 2016 and revision 2017    • History of rheumatic fever as a child 4/19/2019   • Hypercholesterolemia 4/19/2019   • Ischemic cardiomyopathy 5/9/2019   • Kidney cysts    • Osteoarthritis of multiple joints 4/19/2019   • Osteoporosis    • Pacemaker 12/9/2019   • Raynaud's disease 4/19/2019   • RSD (reflex sympathetic dystrophy) 4/19/2019    Of left foot   • SOB (shortness of breath) 12/10/2019   • Status post insertion of drug eluting coronary artery stent 5/9/2019       PAST SURGICAL HISTORY:    Past Surgical History:   Procedure Laterality Date    • CHOLECYSTECTOMY OPEN     • CORONARY ANGIOPLASTY WITH STENT PLACEMENT  04/29/2019    of LAD   • CORONARY ANGIOPLASTY WITH STENT PLACEMENT  04/30/2019    of RCA   • DILATION AND CURETTAGE OF UTERUS     • EYE SURGERY      RIGHT CATARACT   • FOOT SURGERY Left    • JOINT REPLACEMENT Right 09/2016    total hip   • REVISION TOTAL HIP ARTHROPLASTY Right 2017   • TONSILLECTOMY         ALLERGIES:    Allergies   Allergen Reactions   • Ace Inhibitors Other (see comments)     cough   • Atorvastatin      Nausea and can'trecall   • Brilinta [Ticagrelor]      SOB   • Codeine      Nausea and Vomiting   • Dilaudid [Hydromorphone]      Nausea & vomiting   • Morphine Sulfate Nausea Only and GI intolerance   • Onion      Severe abdominal pain   • Oxycodone      Nausea & vomiting, dizziness       CURRENT MEDICATIONS:   FAMILY HISTORY:    Family History   Problem Relation Age of Onset   • Congenital heart disease Biological Mother         noted at autopsy   • Pulmonary fibrosis Biological Father    • Heart attack Paternal Grandfather        SOCIAL HISTORY:    Social History     Tobacco Use   • Smoking status: Never   • Smokeless tobacco: Never   Vaping Use   • Vaping Use: Never used   Substance Use Topics   • Alcohol use: Not Currently   • Drug use: No        PHYSICAL EXAMS:  Temp:  [36.8 °C (98.3 °F)] 36.8 °C (98.3 °F)  Heart Rate:  [85] 85  Resp:  [18] 18  BP: (177)/(98) 177/98        Neurological Examination  Patient is awake alert   Mute    Cranial nerves :  Pupils are equal, round, and reactive to light and accommodation bilaterally.    Eye movements are full without nystagmus.   Visual fields are full.    Facial sensation is intact bilaterally    Facial strength is right lower facial droop.           Motor:   Right upper greater than right lower extremity weakness  Left upper and lower extremity strength appears intact     Sensory exam:  Sensation appears to be intact to light touch B/L     Cerebellar:  Unable to assess reliably  due to aphasia    NIH Stroke Scale    Interval: Baseline  Time:   Person Administering Scale: Ney Echeverriaraman     1a  Level of consciousness: 0=alert; keenly responsive   1b. LOC questions:  2=Performs neither task correctly   1c. LOC commands: 2=Performs neither task correctly   2.  Best Gaze: 0=normal   3. Visual: 0=No visual loss   4. Facial Palsy: 2=Partial paralysis (total or near total paralysis of the lower face)   5a.  Motor left arm: 0=No drift, limb holds 90 (or 45) degrees for full 10 seconds   5b.  Motor right arm: 2=Some effort against gravity, limb cannot get to or maintain (if cured) 90 (or 45) degrees, drifts down to bed, but has some effort against gravity   6a. motor left le=No drift, limb holds 90 (or 45) degrees for full 10 seconds   6b  Motor right le=Drift, limb holds 90 (or 45) degrees but drifts down before full 10 seconds: does not hit bed   7. Limb Ataxia: 0=Absent   8.  Sensory: 0=Normal; no sensory loss   9. Best Language:  3=Mute, global aphasia; no usable speech or auditory comprehension   10. Dysarthria: 2=Severe; patient speech is so slurred as to be unintelligible in the absence of or our of proportion to any dysphagia, or is mute/anarthric   11. Extinction and Inattention: 0=No abnormality    Total:   14       PERTINENT DIAGNOSTIC TESTS:   Head CT without contrast  Currently left MCA ischemic stroke with mild hemorrhagic conversion      CT head and neck no obvious LVO on my review  I have personally reviewed the above imaging, procedure and lab studies.        ASSESSMENT:    78-year-old female with past medical history stated above brought to the hospital for right-sided weakness.    Unclear last known well so not a candidate for IV thrombolytics    CT head and neck and CT perfusion was done as some patients with large ischemic core benefited from mechanical intervention.  CT head and neck no obvious LVO on my review    Problem list  Acute left MCA ischemic stroke with  hemorrhagic conversion.      RECOMMENDATIONS:     Etiology of stroke unclear  Close neuro monitoring in PCU for at least 24 hours  Telemetry/stroke order set   Recommend to obtain follow-up head CT in about 6 to 8 hours.  Check LDL, hemoglobin A1c, transthoracic echo,MRI brain noncontrast.  Holding antiplatelets until imaging stability of the hemorrhagic conversion  Goal blood pressure normotension as last known well unclear and already has hemorrhagic conversion.  High blood pressure would worsen hemorrhagic conversion.  Goal LDL < 70 and normoglycemia  DVT prophylaxis SCDs only for now  PT/OT/SLP eval and treat    Will follow up      The case was discussed with Concah Bennett MD.     Discussed with Neuro IR team and Neuroradiology teams.    I have personally seen this patient, reviewed the history and all the available data, performed the physical examination as documented above, and formulated the plan of care. Also discussed with patient about the side effects of the medications. All questions the patient and her/his family have are answered.    Total critical care time: Approximately 54 minutes  Due to a high probability of clinically significant, life threatening deterioration, the patient required my highest level of preparedness to intervene emergently and I personally spent this critical care time directly and personally managing the patient. This critical care time included obtaining a history; examining the patient; pulse oximetry; ordering and review of studies; arranging urgent treatment with development of a management plan; evaluation of patient's response to treatment; frequent reassessment; and, discussions with other providers.    Thank you for allowing me to participate in the care of your patient.  If you have any further questions, please do not hesitate to contact me.    This encounter was completed utilizing the Direct Speech Voice Recognition Software. Grammatical errors, random word  insertions, pronoun errors, and incomplete sentences are occasional consequences of the system due to software limitation, ambient noise, and hardware issues. These may be missed by proof reading prior to affixing electronic signature. Any questions or concerns about the content, text, or information contained within the body of this dictation should be directly addressed to the physician for clarification. If you have any questions or concerns please do not hesitate to call me directly.       Ney Aparicio MD

## 2023-06-27 ENCOUNTER — BMR PREADMISSION ASSESSMENT (INPATIENT)
Dept: ADMISSIONS | Facility: REHABILITATION | Age: 78
DRG: 057 | End: 2023-06-27
Payer: MEDICARE

## 2023-06-27 ENCOUNTER — APPOINTMENT (INPATIENT)
Dept: CARDIOLOGY | Facility: HOSPITAL | Age: 78
DRG: 064 | End: 2023-06-27
Attending: HOSPITALIST
Payer: MEDICARE

## 2023-06-27 ENCOUNTER — APPOINTMENT (INPATIENT)
Dept: RADIOLOGY | Facility: HOSPITAL | Age: 78
DRG: 064 | End: 2023-06-27
Attending: HOSPITALIST
Payer: MEDICARE

## 2023-06-27 LAB
ANION GAP SERPL CALC-SCNC: 7 MEQ/L (ref 3–15)
AORTIC ROOT ANNULUS: 3 CM
AORTIC VALVE MEAN VELOCITY: 1.29 M/S
AORTIC VALVE VELOCITY TIME INTEGRAL: 40.9 CM
ATRIAL RATE: 91
AV MEAN GRADIENT: 6.66 MMHG
AV PEAK GRADIENT: 12.67 MMHG
AV PEAK VELOCITY-S: 1.78 M/S
AV VALVE AREA INDEX: 0.66
AV VALVE AREA: 1.07 CM2
AV VELOCITY RATIO: 0.33
AVA (VTI): 1.14 CM2
BSA FOR ECHO PROCEDURE: 1.74 M2
BUN SERPL-MCNC: 13 MG/DL (ref 8–20)
CALCIUM SERPL-MCNC: 9 MG/DL (ref 8.9–10.3)
CHLORIDE SERPL-SCNC: 111 MEQ/L (ref 98–109)
CO2 SERPL-SCNC: 23 MEQ/L (ref 22–32)
CREAT SERPL-MCNC: 0.8 MG/DL (ref 0.6–1.1)
DOP CALC LVOT STROKE VOLUME: 46.73 CM3
E WAVE DECELERATION TIME: 71 MS
E/A RATIO: 0.5
E/E' RATIO: 12.5
E/LAT E' RATIO: 5.9
EDV (BP): 96 CM3
EF (A4C): 41.8 %
EF A2C: 43.8 %
EJECTION FRACTION: 42.6 %
ERYTHROCYTE [DISTWIDTH] IN BLOOD BY AUTOMATED COUNT: 13.1 % (ref 11.7–14.4)
EST. AVERAGE GLUCOSE BLD GHB EST-MCNC: 108 MG/DL
ESV (BP): 55.1 CM3
GFR SERPL CREATININE-BSD FRML MDRD: >60 ML/MIN/1.73M*2
GLUCOSE SERPL-MCNC: 107 MG/DL (ref 70–99)
HBA1C MFR BLD: 5.4 %
HCT VFR BLDCO AUTO: 43 % (ref 35–45)
HEART RATE: 80 BPM
HGB BLD-MCNC: 14.3 G/DL (ref 11.8–15.7)
INR PPP: 1.1
LA ESV (BP): 24.8 CM3
LA ESV INDEX (A2C): 16.61 CM3/M2
LA ESV INDEX (BP): 14.25 CM3/M2
LAAS-AP2: 13.1 CM2
LAAS-AP4: 11.6 CM2
LALD A4C: 4.73 CM
LALD A4C: 4.87 CM
LAV-S: 28.9 CM3
LEFT ATRIUM VOLUME INDEX: 12.07 CM3/M2
LEFT ATRIUM VOLUME: 21 CM3
LEFT VENTRICLE DIASTOLIC VOLUME INDEX: 59.77 CM3/M2
LEFT VENTRICLE DIASTOLIC VOLUME: 104 CM3
LEFT VENTRICLE SYSTOLIC VOLUME INDEX: 34.77 CM3/M2
LEFT VENTRICLE SYSTOLIC VOLUME: 60.5 CM3
LV DIASTOLIC VOLUME: 86.8 CM3
LV ESV (APICAL 2 CHAMBER): 48.8 CM3
LVAD-AP2: 30.3 CM2
LVAD-AP4: 33.5 CM2
LVAS-AP2: 21.8 CM2
LVAS-AP4: 24.9 CM2
LVEDVI(A2C): 49.89 CM3/M2
LVEDVI(BP): 55.17 CM3/M2
LVESVI(A2C): 28.05 CM3/M2
LVESVI(BP): 31.67 CM3/M2
LVLD-AP2: 8.8 CM
LVLD-AP4: 8.58 CM
LVLS-AP2: 8.03 CM
LVLS-AP4: 8.39 CM
LVOT 2D: 2.1 CM
LVOT A: 3.46 CM2
LVOT MG: 1 MMHG
LVOT MV: 0.53 M/S
LVOT PEAK VELOCITY: 0.7 M/S
LVOT PG: 2 MMHG
LVOT STROKE VOLUME INDEX: 26.86 ML/M2
LVOT VTI: 13.5 CM
MCH RBC QN AUTO: 31.2 PG (ref 28–33.2)
MCHC RBC AUTO-ENTMCNC: 33.3 G/DL (ref 32.2–35.5)
MCV RBC AUTO: 93.9 FL (ref 83–98)
MLH CV ECHO AVA INDEX VELOCITY RATIO: 0.6
MV E'TISSUE VEL-LAT: 0.06 M/S
MV E'TISSUE VEL-MED: 0.03 M/S
MV PEAK A VEL: 0.81 M/S
MV PEAK E VEL: 0.38 M/S
P AXIS: 109
PDW BLD AUTO: 9 FL (ref 9.4–12.3)
PLATELET # BLD AUTO: 163 K/UL (ref 150–369)
POTASSIUM SERPL-SCNC: 3.4 MEQ/L (ref 3.6–5.1)
PR INTERVAL: 210
PROTHROMBIN TIME: 14.2 SEC (ref 12.2–14.5)
QRS DURATION: 148
QT INTERVAL: 420
QTC CALCULATION(BAZETT): 516
R AXIS: 228
RBC # BLD AUTO: 4.58 M/UL (ref 3.93–5.22)
SODIUM SERPL-SCNC: 141 MEQ/L (ref 136–144)
T WAVE AXIS: 69
TAPSE: 1.84 CM
TR MAX PG: 22.85 MMHG
TRICUSPID VALVE PEAK REGURGITATION VELOCITY: 2.39 M/S
VENTRICULAR RATE: 91
WBC # BLD AUTO: 8.34 K/UL (ref 3.8–10.5)

## 2023-06-27 PROCEDURE — 97167 OT EVAL HIGH COMPLEX 60 MIN: CPT | Mod: GO

## 2023-06-27 PROCEDURE — 63600000 HC DRUGS/DETAIL CODE: Mod: JZ | Performed by: HOSPITALIST

## 2023-06-27 PROCEDURE — 36415 COLL VENOUS BLD VENIPUNCTURE: CPT | Performed by: HOSPITALIST

## 2023-06-27 PROCEDURE — 25800000 HC PHARMACY IV SOLUTIONS: Performed by: HOSPITALIST

## 2023-06-27 PROCEDURE — 25500000 HC DRUGS/INCIDENT RAD: Mod: JZ | Performed by: HOSPITALIST

## 2023-06-27 PROCEDURE — 93306 TTE W/DOPPLER COMPLETE: CPT

## 2023-06-27 PROCEDURE — 99233 SBSQ HOSP IP/OBS HIGH 50: CPT | Performed by: HOSPITALIST

## 2023-06-27 PROCEDURE — 80048 BASIC METABOLIC PNL TOTAL CA: CPT | Performed by: HOSPITALIST

## 2023-06-27 PROCEDURE — 63700000 HC SELF-ADMINISTRABLE DRUG: Performed by: HOSPITALIST

## 2023-06-27 PROCEDURE — 93306 TTE W/DOPPLER COMPLETE: CPT | Mod: 26 | Performed by: INTERNAL MEDICINE

## 2023-06-27 PROCEDURE — 99223 1ST HOSP IP/OBS HIGH 75: CPT | Performed by: INTERNAL MEDICINE

## 2023-06-27 PROCEDURE — 85027 COMPLETE CBC AUTOMATED: CPT | Performed by: HOSPITALIST

## 2023-06-27 PROCEDURE — 92610 EVALUATE SWALLOWING FUNCTION: CPT | Mod: GN

## 2023-06-27 PROCEDURE — G1004 CDSM NDSC: HCPCS

## 2023-06-27 PROCEDURE — 25000000 HC PHARMACY GENERAL: Performed by: HOSPITALIST

## 2023-06-27 PROCEDURE — 97163 PT EVAL HIGH COMPLEX 45 MIN: CPT | Mod: GP

## 2023-06-27 PROCEDURE — 99233 SBSQ HOSP IP/OBS HIGH 50: CPT | Performed by: INTERNAL MEDICINE

## 2023-06-27 PROCEDURE — 20600000 HC ROOM AND CARE INTERMEDIATE/TELEMETRY

## 2023-06-27 PROCEDURE — 85610 PROTHROMBIN TIME: CPT | Performed by: HOSPITALIST

## 2023-06-27 RX ORDER — ENOXAPARIN SODIUM 100 MG/ML
40 INJECTION SUBCUTANEOUS
Status: DISCONTINUED | OUTPATIENT
Start: 2023-06-27 | End: 2023-06-28

## 2023-06-27 RX ORDER — ASPIRIN 300 MG/1
300 SUPPOSITORY RECTAL DAILY
Status: DISCONTINUED | OUTPATIENT
Start: 2023-06-27 | End: 2023-06-28

## 2023-06-27 RX ADMIN — HYDRALAZINE HYDROCHLORIDE 10 MG: 20 INJECTION INTRAMUSCULAR; INTRAVENOUS at 21:44

## 2023-06-27 RX ADMIN — ENOXAPARIN SODIUM 40 MG: 40 INJECTION SUBCUTANEOUS at 18:10

## 2023-06-27 RX ADMIN — SODIUM CHLORIDE: 9 INJECTION, SOLUTION INTRAVENOUS at 08:59

## 2023-06-27 RX ADMIN — HYDRALAZINE HYDROCHLORIDE 10 MG: 20 INJECTION INTRAMUSCULAR; INTRAVENOUS at 16:25

## 2023-06-27 RX ADMIN — SODIUM CHLORIDE: 9 INJECTION INTRAMUSCULAR; INTRAVENOUS; SUBCUTANEOUS at 09:49

## 2023-06-27 RX ADMIN — SODIUM CHLORIDE: 9 INJECTION, SOLUTION INTRAVENOUS at 21:19

## 2023-06-27 RX ADMIN — ASPIRIN 300 MG: 300 SUPPOSITORY RECTAL at 09:20

## 2023-06-27 RX ADMIN — HYDRALAZINE HYDROCHLORIDE 10 MG: 20 INJECTION INTRAMUSCULAR; INTRAVENOUS at 09:37

## 2023-06-27 ASSESSMENT — COGNITIVE AND FUNCTIONAL STATUS - GENERAL
STANDING UP FROM CHAIR USING ARMS: 2 - A LOT
AFFECT: FLAT/BLUNTED AFFECT
MOVING TO AND FROM BED TO CHAIR: 3 - A LITTLE
CLIMB 3 TO 5 STEPS WITH RAILING: 2 - A LOT
TOILETING: 1 - TOTAL
FOLLOWS FAMILIAR CONVERSATION: 1 - UNABLE
STANDING UP FROM CHAIR USING ARMS: 3 - A LITTLE
HELP NEEDED FOR BATHING: 1 - TOTAL
DRESSING REGULAR LOWER BODY CLOTHING: 1 - TOTAL
MOVING TO AND FROM BED TO CHAIR: 3 - A LITTLE
AFFECT: FLAT/BLUNTED AFFECT
HELP NEEDED FOR PERSONAL GROOMING: 2 - A LOT
REMEMBERING 5 ERRANDS WITH NO LIST: 1 - UNABLE
AFFECT: FLAT/BLUNTED AFFECT
WALKING IN HOSPITAL ROOM: 2 - A LOT
CLIMB 3 TO 5 STEPS WITH RAILING: 2 - A LOT
UNDERSTANDING 10 TO 15 MIN SPEECH: 1 - UNABLE
REMEMBERING WHERE THINGS ARE: 1 - UNABLE
REMEMBERING TO TAKE MEDICATION: 1 - UNABLE
DRESSING REGULAR UPPER BODY CLOTHING: 2 - A LOT
EATING MEALS: 1 - TOTAL
WALKING IN HOSPITAL ROOM: 3 - A LITTLE
STANDING UP FROM CHAIR USING ARMS: 3 - A LITTLE
TAKING CARE OF COMPLICATED TASKS: 1 - UNABLE
WALKING IN HOSPITAL ROOM: 3 - A LITTLE
MOVING TO AND FROM BED TO CHAIR: 3 - A LITTLE
CLIMB 3 TO 5 STEPS WITH RAILING: 3 - A LITTLE

## 2023-06-27 NOTE — PROGRESS NOTES
Hospital Medicine Service -  Daily Progress Note       SUBJECTIVE     Interval History: No acute events overnight. Awake, Aphasia, Following, Understanding. One of her friend a t bedside.   Right facial droop, R. Hemiparesis  NPO, waiting for SLP assesment  Monitor: Paced rhythm   IVF  V/S stable.  transferring to Tele bed     OBJECTIVE        Vital signs in last 24 hours:  Temp:  [36.7 °C (98 °F)-36.9 °C (98.5 °F)] 36.9 °C (98.5 °F)  Heart Rate:  [66-96] 72  Resp:  [16-20] 18  BP: (111-190)/(61-98) 142/63  I/O last 3 completed shifts:  In: 1028 [I.V.:1028]  Out: 425 [Urine:425]    PHYSICAL EXAMINATION        GEN: well-developed and well-nourished; not in acute distress  HEENT: normocephalic; atraumatic  NECK: no JVD; no bruits  CARDIO: regular rate and rhythm; no murmurs or rubs  RESP: clear to auscultation bilaterally; no rales, rhonchi, or wheezes  ABD: soft, non-distended, non-tender, normal bowel sounds  EXT: no cyanosis, clubbing, or edema  SKIN: clean, dry, warm, and intact  MUSCULOSKELETAL: no injury or deformity  NEURO: alert and Aphasia, unable to answer, following, Right facial droop, Right side Motor 4/5, unable to walk.  BEHAVIOR/EMOTIONAL: appropriate; cooperative     LABS / IMAGING / TELE        Labs  Lab Results   Component Value Date    WBC 8.34 06/27/2023    HGB 14.3 06/27/2023    HCT 43.0 06/27/2023    MCV 93.9 06/27/2023     06/27/2023     Lab Results   Component Value Date    GLUCOSE 107 (H) 06/27/2023    CALCIUM 9.0 06/27/2023     06/27/2023    K 3.4 (L) 06/27/2023    CO2 23 06/27/2023     (H) 06/27/2023    BUN 13 06/27/2023    CREATININE 0.8 06/27/2023     Lab Results   Component Value Date    INR 1.1 06/27/2023    INR 1.0 06/26/2023    INR 1.0 11/18/2019       Imaging  CT HEAD WITHOUT IV CONTRAST    Result Date: 6/27/2023  IMPRESSION: Acute/subacute left MCA distribution infarct with 3 mm midline shift.. No definite hemorrhage COMMENT: Technique: Computed tomography of  the brain was performed utilizing contiguous 2.5 mm transaxial sections without intravenous contrast administration. CT DOSE:  One or more dose reduction techniques (e.g. automated exposure control, adjustment of the mA and/or kV according to patient size, use of iterative reconstruction technique) utilized for this examination. Comparison studies: Head CT dated June 26, 2023. Postsurgical change: None. Brain parenchyma: There is a large area of poor gray-white matter differentiation in the left frontal parietal lobe and subinsular region with 3 mm of midline shift.. No definite hemorrhage. There is intracranial atherosclerotic disease. White matter changes: Normal for age Ventricles, cisterns, and sulci: Normal in size and configuration. Sella and cerebellar tonsils: Normal in appearance. Calvarium and extra cranial soft tissues: Normal. Paranasal sinuses: Clear bilaterally. Mastoid air cell: Normal Orbits: Normal     CT HEAD WITHOUT IV CONTRAST    Result Date: 6/26/2023  IMPRESSION: Evolving left MCA territory infarct.  Small hyperdense foci within the infarct may reflect contrast staining from interval angiography versus punctate hemorrhage.  Short interval follow-up recommended.    CT HEAD STROKE ALERT WITHOUT IV CONTRAST    Result Date: 6/26/2023  IMPRESSION: 1. Findings highly concerning for an acute infarct in the posterior aspect of the left MCA territory with an associated thrombosed vessel. 2. Chronic microangiopathy and involutional changes. Finding:    Stroke alert   Acuity: Critical  Status:  CLOSED Critical read back was performed and results were read back by ARIAN Washington on 6/26/2023 at 3:39 PM.     CT ANGIOGRAPHY HEAD/NECK WITH AND WITHOUT IV CONTRAST    Result Date: 6/26/2023  IMPRESSION: 1. Atherosclerosis at the carotid bifurcations with mild luminal narrowing measuring approximately 25% on the right and 20% on the left using NASCET criteria. Patent cervical carotid and vertebral arteries. 2.  Termination of flow in an M3 branch of the left middle cerebral artery. 3. Intracranial atherosclerosis with significant narrowing involving the left V4 segment after the takeoff of the posterior inferior cerebellar artery. Mild irregularity of the basilar artery. 4. Completed infarct in the posterior aspect of the left MCA territory. No evidence of ischemic penumbra. rCBF <30% : 0 cc TMax > 6s: 2 cc Mismatch volume: 2 cc Mismatch Ratio: NA 5. Additional chronic and incidental findings, as above. In order to accelerate response time, other vascular pathology including occlusions of more distal arteries are not completely evaluated on this examination. Finding:    Stroke alert   Acuity: Critical  Status:  CLOSED Critical read back was performed and results were read back by Dr. Jordan, on 6/26/2023 at 4:03 PM. Critical read back was also performed by Dr. Aparicio and the results were critically read back at 4:07 PM.     CT BRAIN PERFUSION WITH IV CONTRAST    Result Date: 6/26/2023  IMPRESSION: 1. Atherosclerosis at the carotid bifurcations with mild luminal narrowing measuring approximately 25% on the right and 20% on the left using NASCET criteria. Patent cervical carotid and vertebral arteries. 2. Termination of flow in an M3 branch of the left middle cerebral artery. 3. Intracranial atherosclerosis with significant narrowing involving the left V4 segment after the takeoff of the posterior inferior cerebellar artery. Mild irregularity of the basilar artery. 4. Completed infarct in the posterior aspect of the left MCA territory. No evidence of ischemic penumbra. rCBF <30% : 0 cc TMax > 6s: 2 cc Mismatch volume: 2 cc Mismatch Ratio: NA 5. Additional chronic and incidental findings, as above. In order to accelerate response time, other vascular pathology including occlusions of more distal arteries are not completely evaluated on this examination. Finding:    Stroke alert   Acuity: Critical  Status:  CLOSED Critical  read back was performed and results were read back by Dr. Jordan, on 6/26/2023 at 4:03 PM. Critical read back was also performed by Dr. Aparicio and the results were critically read back at 4:07 PM.     X-RAY CHEST 1 VIEW    Result Date: 6/26/2023  IMPRESSION: No active disease in the chest.       ECG/Telemetry  I have independently reviewed the telemetry. No events for the last 24 hours.    ASSESSMENT AND PLAN      Elevated blood pressure reading with diagnosis of hypertension  Assessment & Plan  hold home meds  Fall precautions  Aspiration precautions  Consult pt/ot  Consult case mgt  bp parameters for hypotension  dvt prophylaxis with  scd  Pt is full code  Spent 55 minutes assessing planning for patient care    6/27  Stable today     Cardiac pacemaker  Assessment & Plan  Normal Function on monitor    Ischemic cardiomyopathy  Assessment & Plan  Monitor on telemetry  N.p.o. except medication  Trend troponin  Repeat ECG  Received 325 mg ASA, will continue 81 mg daily  Cardiology consult    * Cerebrovascular accident (CVA), unspecified mechanism (CMS/HCC)  Assessment & Plan  Brain CT x3: Left MCA territory Ischemia. No Hemorrhage    Monitor on PCU  Aphasic and right-sided weakness  Neurochecks/NIH  No MRI brain, MRA head/neck ordered Due to PPM  Obtain ECHO  Trend troponin  Stabilizing BP  Check Lipid panel, A1c  Received 325 mg ASA, will continue 81 mg daily  Continue home regimen   IV Hydralazine SBP > 180  Neurology consult Dr. Aparicio saw her , appreciated.           VTE Assessment: [place]    Code Status: Full Code  Estimated discharge date: 6/29/2023     Richard Pandya MD  6/27/2023  10:47 AM

## 2023-06-27 NOTE — ED ATTESTATION NOTE
I have personally seen and examined Jennifer Sams.  I was involved in the care and medical decision making for this patient.    I reviewed and agree with physician assistant / nurse practitioner’s assessment and plan of care; any exceptions are documented below.      My focused history, examination, assessment and plan of care of Jennifer Sams is as follows:    Brief History:  HPI  78-year-old female who lives at home alone was brought in by EMS with aphasia and right-sided weakness.  Patient was last seen by her friends on Saturday.  They have been unable to reach her since then.  They found her today aphasic with right upper extremity weakness.  The medics reported the patient was able to ambulate without difficulties and was trying to run away from them.  Patient is not able to provide any history due to her aphasia.    Focused Physical Exam:  Physical Exam  Patient is awake and alert.  Patient has a right facial droop.  She has weakness to her right upper extremity.  She is having difficulty following commands.    Assessment / Plan / MDM:  [facility]  Stroke alert was activated.  Patient is not a tPA candidate as her last known well was 48 hours.  Dr. Aparicio evaluated the patient at the bedside.  Head CT shows a completed left MCA infarct.  Patient is not a neuro intervention candidate.  Patient was admitted to the PCU.  Critical care time spent taking care of the patient was 50 minutes.               Concha Jordan MD  06/26/23 2023

## 2023-06-27 NOTE — PLAN OF CARE
Problem: Adult Inpatient Plan of Care  Goal: Plan of Care Review  Outcome: Not progressing  Flowsheets (Taken 6/27/2023 [id number])  Progress: no change  Outcome Evaluation: Speech evaluation was completed.  Plan of Care Reviewed With: patient

## 2023-06-27 NOTE — PROGRESS NOTES
NEUROLOGY PROGRESS NOTE    Subjective     Interval History:    Patient continues to have aphasia        Current Facility-Administered Medications   Medication Dose Route Frequency Provider Last Rate Last Admin   • acetaminophen (TYLENOL) tablet 650 mg  650 mg oral q6h PRN Kelby Martinez MD       • [Provider Managed Hold] aspirin enteric coated tablet 81 mg  81 mg oral Daily Kelby Martinez MD       • aspirin suppository 300 mg  300 mg rectal Daily Richard Pandya MD   300 mg at 06/27/23 0920   • glucose chewable tablet 16-32 g of dextrose  16-32 g of dextrose oral PRN Kelby Martinez MD        Or   • dextrose 40 % oral gel 15-30 g of dextrose  15-30 g of dextrose oral PRN Kelby Martinez MD        Or   • glucagon (GLUCAGEN) injection 1 mg  1 mg intramuscular PRN Kelby Martinez MD        Or   • dextrose 50 % in water (D50) injection 12.5 g  25 mL intravenous PRN Kelby Martinez MD       • enoxaparin (LOVENOX) syringe 40 mg  40 mg subcutaneous Daily (6p) Richard Pandya MD       • hydrALAZINE (APRESOLINE) injection 10 mg  10 mg intravenous q4h PRN Kelby Martinez MD   10 mg at 06/26/23 1922   • LORazepam (ATIVAN) injection 1 mg  1 mg intravenous Once in imaging Kelby Martinez MD       • magnesium sulfate IVPB 2g in 50 mL NSS/D5W/SWFI  2 g intravenous PRN Kelby Martinez MD       • ondansetron (ZOFRAN) injection 4 mg  4 mg intravenous q8h PRN Kelby Martinez MD       • potassium chloride (KLOR-CON M) ER tablet (particles/crystals) 20 mEq  20 mEq oral PRN Kelby Martinez MD       • potassium chloride (KLOR-CON M) ER tablet (particles/crystals) 40 mEq  40 mEq oral PRN Kelby Martinez MD       • potassium chloride 20 mEq in 100 mL IVPB  (premix)  20 mEq intravenous PRN Kelby Martinez MD       • potassium chloride 20 mEq in 100 mL IVPB  (premix)  20 mEq intravenous PRN Kelby Martinez MD        And   • potassium chloride 20 mEq in 100 mL IVPB  (premix)  20 mEq intravenous PRN Kelby Martinez MD        • senna (SENOKOT) tablet 1 tablet  1 tablet oral 2x daily PRN Kelby Martinez MD       • sodium chloride 0.9 % infusion   intravenous Continuous Kelby Martinez MD 80 mL/hr at 06/27/23 0859 New Bag at 06/27/23 0859       Ace inhibitors, Atorvastatin, Brilinta [ticagrelor], Codeine, Dilaudid [hydromorphone], Morphine sulfate, Onion, and Oxycodone       Objective     Vital signs in last 24 hours:  Temp:  [36.7 °C (98 °F)-36.9 °C (98.5 °F)] 36.9 °C (98.5 °F)  Heart Rate:  [66-96] 80  Resp:  [16-20] 16  BP: (111-190)/(61-98) 148/69    Physical Exam:    General appearance: Lying in the bed and appears comfortable  Eyes: anicteric sclerae, moist conjunctivae; no lid-lag;   HENT: Atraumatic; oropharynx clear with moist mucous membranes and no mucosal ulcerations; normal hard and soft palate  Neck: Trachea midline; , supple.  Lungs: CTA, with normal respiratory effort and no intercostal retractions  CV: RRR, no MRGs  Abdomen: Soft, non-tender; no masses or HSM  Extremities: No peripheral edema. Pulses are intact  Skin: no rash, ulcers           Neurological Examination  Awake and alert  Right lowe upper and lower facial  Severe aphasia: Comprehension appears to be intact but unable to form words.  Unable to repeat, unable to name  Right upper extremity: 1/5 at the wrist extension, 2/5 elbow extension able to hold antigravity for 2 to 3 seconds but pronator drift  Right lower extremity 3/5    Left upper and lower extremities 5/5    LABS:  I have reviewed the patient's lab results, and noted pertinent findings. I have reviewed the labs as noted below:    LDL 88  HbA1c 5.4        IMAGING:  I have personally reviewed the images for the studies listed below.     CTA head and neck 6/26/2023  IMPRESSION:  1. Atherosclerosis at the carotid bifurcations with mild luminal narrowing  measuring approximately 25% on the right and 20% on the left using NASCET  criteria. Patent cervical carotid and vertebral arteries.  2.  Termination of flow in an M3 branch of the left middle cerebral artery.  3. Intracranial atherosclerosis with significant narrowing involving the left V4  segment after the takeoff of the posterior inferior cerebellar artery. Mild  irregularity of the basilar artery.  4. Completed infarct in the posterior aspect of the left MCA territory. No  evidence of ischemic penumbra.  rCBF <30% : 0 cc  TMax > 6s: 2 cc  Mismatch volume: 2 cc  Mismatch Ratio: NA  5. Additional chronic and incidental findings, as above.     CT head w/o contrast 6/27/2023  IMPRESSION: Acute/subacute left MCA distribution infarct with 3 mm midline  shift.. No definite hemorrhage       OTHER RESULTS:  TTE   IMPRESSION:  1. Atherosclerosis at the carotid bifurcations with mild luminal narrowing  measuring approximately 25% on the right and 20% on the left using NASCET  criteria. Patent cervical carotid and vertebral arteries.  2. Termination of flow in an M3 branch of the left middle cerebral artery.  3. Intracranial atherosclerosis with significant narrowing involving the left V4  segment after the takeoff of the posterior inferior cerebellar artery. Mild  irregularity of the basilar artery.  4. Completed infarct in the posterior aspect of the left MCA territory. No  evidence of ischemic penumbra.  rCBF <30% : 0 cc  TMax > 6s: 2 cc  Mismatch volume: 2 cc  Mismatch Ratio: NA          ASSESSMENT AND PLAN:    78-year-old female with past medical history stated above brought to the hospital for right-sided weakness.     Unclear last known well so not a candidate for IV thrombolytics.CT head and neck and CT perfusion was done as some patients with large ischemic core benefited from mechanical intervention.  CT head and neck no obvious LVO on my review     Problem list  Acute left MCA ischemic stroke with hemorrhagic conversion.  Mass effect 3 mm left-to-right midline shift.      6/27/2023:  Even though the head CT shows radiological evidence of mass  effect, the patient at bedside appears clinically stable without any worsening of examination.  So we will hold off hypertonic saline.     RECOMMENDATIONS:      Etiology of stroke unclear  Telemetry/stroke order set  Transthoracic echo results pending  If TTE is unremarkable, patient will need outpatient implantable cardiac monitor  Goal blood pressure normotension.  Goal LDL < 70 and normoglycemia  DVT prophylaxis SCDs only for now  PT/OT/SLP eval and treat  Continue close neuro monitoring in PCU.  If exam worsens stat head and contact neurology stat.    We will follow-up    D/W Dr. Mumtaz MURPHY have personally seen this patient, reviewed the history and all the available data, performed the physical examination as documented above, and formulated the plan of care. Also discussed with patient about the side effects of the medications. All questions the patient and her/his family have are answered.    Thank you for allowing me to participate in the care of your patient.  If you have any further questions, please do not hesitate to contact me.    This encounter was completed utilizing the Direct Speech Voice Recognition Software. Grammatical errors, random word insertions, pronoun errors, and incomplete sentences are occasional consequences of the system due to software limitation, ambient noise, and hardware issues. These may be missed by proof reading prior to affixing electronic signature. Any questions or concerns about the content, text, or information contained within the body of this dictation should be directly addressed to the physician for clarification. If you have any questions or concerns please do not hesitate to call me directly.       Ney Aparicio MD  Neurology

## 2023-06-27 NOTE — PLAN OF CARE
Problem: Adult Inpatient Plan of Care  Goal: Plan of Care Review  Outcome: Progressing  Flowsheets (Taken 6/27/2023 2516)  Outcome Evaluation: OT evaluation completed. Patient currently CS bed mobility, Mod A transfers and steps at bedside, Dep grooming and dressing. Recommend acute inpatient rehab at d/c. OT to follow.  Plan of Care Reviewed With: patient

## 2023-06-27 NOTE — PLAN OF CARE
Problem: Adult Inpatient Plan of Care  Goal: Plan of Care Review  Outcome: Progressing  Flowsheets (Taken 6/27/2023 1645)  Progress: improving  Outcome Evaluation:   PT evaluation complete   Pt required Supervision for bed mobility and ModA for transfers, and ambulation with hand held assit   D/C rec Acute Inpatient Rehab  Plan of Care Reviewed With:   patient   family  Goal: Patient-Specific Goal (Individualized)  Outcome: Progressing  Flowsheets (Taken 6/27/2023 1645)  Patient/Family-Specific Goals (Include Timeframe): None stated     Problem: Mobility Impairment  Goal: Optimal Mobility  Outcome: Progressing

## 2023-06-27 NOTE — PROGRESS NOTES
Patient:  Jennifer Sams  Location:  [facility]  MRN:  [id number]  Today's date:  6/27/2023    SLP Diagnosis  Profound expressive and receptive aphasia. Suspect severe oral dysphagia and suspected pharyngeal dysphagia based on limited assessment.    Swallowing Recommendations  Diet Consistency NPO     Medication Administration non-oral route   Supervision Level     Feeding Recommendations     Posture Recommendations     Swallowing Strategies     Instrumental Assessment Recommendations  .     Comments Frequent oral care needed.     Summary/Handoff  Speech evaluation was completed. Pt was seen at bedside awake. Pt presents with profound expressive and receptive aphasia. Pt largely non-verbal, moaning observed 1x. Pt unable to follow commands or answer simple yes/no questions. Attempted trials of ice chips. Pt with absent acceptance/oral opening. Pt turning head away to trials. Limited awareness to thermal stimulation. At this time, recommend STRICT NPO. Ongoing assessment needed to improve functional communication and further assess swallow function and safety.    Active Diet Orders (From admission, onward)     Start     Ordered    06/26/23 1653  NPO Diet  Diet effective now        Comments: If patient passes dysphagia screen, then order diet.   Question Answer Comment   NPO except: Ice chips    NPO except: Sips with meds        06/26/23 1652                Jennifer is a 78 y.o. female admitted on 6/26/2023 with NSTEMI (non-ST elevated myocardial infarction) (CMS/HCC) [I21.4]  Coronary artery disease involving native coronary artery of native heart with angina pectoris (CMS/HCC) [I25.119]  Cerebrovascular accident (CVA), unspecified mechanism (CMS/HCC) [I63.9]. Principal problem is Cerebrovascular accident (CVA), unspecified mechanism (CMS/HCC).    Past Medical History  Jennifer has a past medical history of AAA (abdominal aortic aneurysm), Arthritis, Elevated blood pressure reading  without diagnosis of hypertension (4/19/2019), Epistaxis (1/6/2020), GERD (gastroesophageal reflux disease) (4/19/2019), Glaucoma (4/19/2019), Heart murmur, Hiatal hernia, History of hip replacement, total, right (4/19/2019), History of rheumatic fever as a child (4/19/2019), Hypercholesterolemia (4/19/2019), Ischemic cardiomyopathy (5/9/2019), Kidney cysts, Osteoarthritis of multiple joints (4/19/2019), Osteoporosis, Pacemaker (12/9/2019), Raynaud's disease (4/19/2019), RSD (reflex sympathetic dystrophy) (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    History of Present Illness   78 y.o. female with a past medical history of AAA GERD CAD with a pacemaker. Lives at alone asked patient does still his own ADLs.  Patient has no family members except for friends who are basically are power of .  According to friend patient has not been in contact for the past 2 to 3 days and decided to visit patient.  Patient confused combative standing but on a leaning to the right side.  Patient needed to have EMS arrived as patient became combative needed to be bearhug throughout the trip.  Patient here in the ED was aphasic with right-sided weakness patient's blood pressure was 190/84 temperature was 98.3 BUN was 14 glucose 117 hemoglobin was 15.8    CT brain perfusion: 1. Atherosclerosis at the carotid bifurcations with mild luminal narrowing measuring approximately 25% on the right and 20% on the left using NASCET criteria. Patent cervical carotid and vertebral arteries. 2. Termination of flow in an M3 branch of the left middle cerebral artery. 3. Intracranial atherosclerosis with significant narrowing involving the left V4 segment after the takeoff of the posterior inferior cerebellar artery. Mild irregularity of the basilar artery. 4. Completed infarct in the posterior aspect of the left MCA territory. No evidence of ischemic penumbra.  rCBF <30% : 0 cc TMax > 6s: 2 cc Mismatch volume: 2 cc Mismatch Ratio: NA 5. Additional chronic and incidental findings, as above.    CT head (6/26): Evolving left MCA territory infarct.  Small hyperdense foci within the infarct may reflect contrast staining from interval angiography versus punctate hemorrhage.  Short interval follow-up recommended.    CT head (6/27): Acute/subacute left MCA distribution infarct with 3 mm midline shift.. No definite hemorrhage      SLP Pain    Date/Time Pain Type Acceptable Pain Level CNPI: Nonverbal Movement CNPI: Nonverbal Rest CNPI: Facial Movement CNPI: Facial Rest CNPI: Bracing Movement CNPI: Bracing Rest CNPI: Restlessness Movement CNPI: Restlessness Rest CNPI: Rubbing Movement CNPI: Rubbing Rest CNPI: Vocal Movement [facility]   06/27/23 0915 Pain Assessment 0 0 0 0 0 0 0 0 0 0 0 0 MEB            Prior Level of Function    Flowsheet Row Most Recent Value   Communication understands/communicates without difficulty   Swallowing swallows foods/liquids without difficulty   Baseline Diet/Method of Nutritional Intake thin liquids, regular   Assistive Device Currently Used at Home No known           SLP Evaluation and Treatment - 06/27/23 0940        SLP Time Calculation    Start Time 0913     Stop Time 0921     Time Calculation (min) 8 min        General Information    Document Type Initial Evaluation     Mode of Treatment individual therapy;speech language pathology     Position at Start of Session upright;in bed     Status at Start of Session no issues or concerns identified by nurse prior to session     General Observations of Patient Pt was seen at bedside awake. Non-verbal and not following commands.        Precautions/Limitations/Impairments    Existing Precautions/Restrictions fall;NPO;aspiration     Limitations/Impairments swallowing;safety/cognitive        Cognition/Psychosocial    Affect/Mental Status flat/blunted affect     Orientation Status unable/difficult to assess      Follows Commands does not follow one-step commands     Comment, Cognition Pt non-verbal and not following commands.        Dentition (Oral Motor)    Dentition (Oral Motor) adequate dentition        Facial Symmetry (Oral Motor)    Facial Symmetry (Oral Motor) right side impairment     Right Side Facial Asymmetry minimal impairment;moderate impairment        Lip Function (Oral Motor)    Lip Range of Motion (Oral Motor) unable/difficult to assess        Tongue Function (Oral Motor)    Tongue ROM (Oral Motor) unable/difficult to assess        Jaw Function (Oral Motor)    Jaw Function (Oral Motor) unable/difficult to assess        Cough/Swallow/Gag Reflex (Oral Motor)    Volitional Throat Clear/Cough (Oral Motor) unable/difficult to assess     Volitional Swallow (Oral Motor) unable to initiate        Vocal Quality/Secretion Management (Oral Motor)    Vocal Quality (Oral Motor) unable/difficult to assess        GRBAS    Grade 0-->none     Roughness 0-->none     Breathiness 0-->none     Asthenia 0-->none     Strain 0-->none        Motor Speech    Speech Intelligibility (Motor Speech) unable/difficult to assess        Auditory Comprehension    Follows Commands (Auditory Comprehension) unable/difficult to assess     Yes/No Questions (Auditory Comprehension) unable/difficult to assess     Object Identification (Auditory Comprehension) unable/difficult to assess     Comment, Assessment (Auditory Comprehension) Pt unable to follow commands or answer simple yes/no questions        Verbal Expression    Automatic Speech (Verbal Expression) unable/difficult to assess     Confrontational Naming (Verbal Expression) unable/difficult to assess     Comment, Assessment (Verbal Expression) Pt largely non-verbal        Functional Communication Measures    FCM: Spoken Language, Comprehension 1-->Level 1     FCM: Spoken Language, Expression 1-->Level 1     FCM: Swallowing 1-->Level 1        General Swallowing Observations    Current  Diet/Method of Nutritional Intake NPO     Signs/Symptoms of Aspiration (Current Diet) none     Respiratory Support (General Swallowing Observations) none        Food and Liquid Trials (NIS)    Patient Positioning HOB elevated (specify degrees)     Oral Intake/Feeding Performance oral trials administered by therapist     Liquid Consistencies Evaluated thin liquids     Thin Liquids impaired;use of teaspoon     Comment, Thin Liquids Attempted ice chips     Oral Preparatory Phase of Swallow severe impairment;mouth opening ROM decreased     Comment, Oral Phase Attempted ice chip trials. Pt with absent acceptance. Absent oral opening. Oral defensive behaviors demonstrated (pt turning head away).        Swallowing Recommendations    Diet Consistency Recommendations NPO     Medication Administration non-oral route     Comment, Swallowing Recommendations Frequent oral care needed.        AM-PAC™ - Cognition (Current Function)    Following/understanding a 10-15 minute speech or presentation? 1 - Unable     Understanding familiar people during ordinary conversations? 1 - Unable     Remembering to take medications at the appropriate time? 1 - Unable     Remembering where things were placed or put away? 1 - Unable     Remembering a list of 3 or 4 errands without writing it down? 1 - Unable     Taking care of complicated tasks? 1 - Unable     AM-PAC™ Cognition Score 6        SLP Goals    Swallowing Goal Selection pharyngeal phase of swallow, SLP goal 1        Session Outcome    Position at End of Session upright;in bed     Status at End of Session bed alarm on        Plan    Rehab Potential fair, will monitor progress closely     Therapy Frequency 5 times/wk     Planned Therapy Interventions dysphagia therapy;speech therapy;language therapy                        Education Documentation  Treatment Plan, taught by Edda Joseph CCC-SLP at 6/27/2023  9:55 AM.  Learner: Patient  Readiness: Nonacceptance  Method:  Explanation  Response: No Evidence of Learning  Comment: Re: aspiration risk and precautions    TIA (Transient Ischemic Attack), taught by Edda Joseph CCC-SLP at 6/27/2023  9:55 AM.  Learner: Patient  Readiness: Nonacceptance  Method: Explanation  Response: No Evidence of Learning  Comment: Re: aspiration risk and precautions    Signs/Symptoms, taught by Edda Joseph CCC-SLP at 6/27/2023  9:55 AM.  Learner: Patient  Readiness: Nonacceptance  Method: Explanation  Response: No Evidence of Learning  Comment: Re: aspiration risk and precautions    Description, taught by Edda Joseph CCC-SLP at 6/27/2023  9:55 AM.  Learner: Patient  Readiness: Nonacceptance  Method: Explanation  Response: No Evidence of Learning  Comment: Re: aspiration risk and precautions    Causes, taught by Edda Joseph CCC-SLP at 6/27/2023  9:55 AM.  Learner: Patient  Readiness: Nonacceptance  Method: Explanation  Response: No Evidence of Learning  Comment: Re: aspiration risk and precautions          SLP Goals    Flowsheet Row Most Recent Value   Pharyngeal Swallow Goal 1    Activity safely tolerate, thin liquids, pureed (PU4) at 06/27/2023 0940   [place] independently at 06/27/2023 0940   Time Frame by discharge at 06/27/2023 0940   Progress/Outcome goal ongoing at 06/27/2023 0940

## 2023-06-27 NOTE — PLAN OF CARE
Problem: Adult Inpatient Plan of Care  Goal: Plan of Care Review  Outcome: Progressing  Goal: Patient-Specific Goal (Individualized)  Outcome: Progressing  Goal: Absence of Hospital-Acquired Illness or Injury  Outcome: Progressing  Goal: Optimal Comfort and Wellbeing  Outcome: Progressing  Goal: Readiness for Transition of Care  Outcome: Progressing     Problem: Adult Inpatient Plan of Care  Goal: Patient-Specific Goal (Individualized)  Outcome: Progressing     Problem: Adult Inpatient Plan of Care  Goal: Plan of Care Review  Outcome: Progressing     Problem: Adult Inpatient Plan of Care  Goal: Plan of Care Review  Outcome: Progressing

## 2023-06-27 NOTE — PROGRESS NOTES
Occupational Therapy -  Initial Evaluation     Patient: Jennifer Sams  Location: [facility] Care Unit 3230  MRN: [id number]  Today's date: 6/27/2023    HISTORY OF PRESENT ILLNESS     Jennifer is a 78 y.o. female admitted on 6/26/2023 with NSTEMI (non-ST elevated myocardial infarction) (CMS/[facility]) [I21.4]  Coronary artery disease involving native coronary artery of native heart with angina pectoris (CMS/[facility]) [I25.119]  Cerebrovascular accident (CVA), unspecified mechanism (CMS/HCC) [I63.9]. Principal problem is Cerebrovascular accident (CVA), unspecified mechanism (CMS/HCC).    Past Medical History  Jennifer has a past medical history of AAA (abdominal aortic aneurysm), Arthritis, Elevated blood pressure reading without diagnosis of hypertension (4/19/2019), Epistaxis (1/6/2020), GERD (gastroesophageal reflux disease) (4/19/2019), Glaucoma (4/19/2019), Heart murmur, Hiatal hernia, History of hip replacement, total, right (4/19/2019), History of rheumatic fever as a child (4/19/2019), Hypercholesterolemia (4/19/2019), Ischemic cardiomyopathy (5/9/2019), Kidney cysts, Osteoarthritis of multiple joints (4/19/2019), Osteoporosis, Pacemaker (12/9/2019), Raynaud's disease (4/19/2019), RSD (reflex sympathetic dystrophy) (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    History of Present Illness   78 y.o. female with a past medical history of AAA GERD CAD with a pacemaker. Lives at alone asked patient does still his own ADLs.  Patient has no family members except for friends who are basically are power of .  According to friend patient has not been in contact for the past 2 to 3 days and decided to visit patient.  Patient confused combative standing but on a leaning to the right side.  Patient needed to have EMS arrived as patient became combative needed to be bearhug throughout the trip.  Patient here in the ED  was aphasic with right-sided weakness patient's blood pressure was 190/84 temperature was 98.3 BUN was 14 glucose 117 hemoglobin was 15.8    CT brain perfusion: 1. Atherosclerosis at the carotid bifurcations with mild luminal narrowing measuring approximately 25% on the right and 20% on the left using NASCET criteria. Patent cervical carotid and vertebral arteries. 2. Termination of flow in an M3 branch of the left middle cerebral artery. 3. Intracranial atherosclerosis with significant narrowing involving the left V4 segment after the takeoff of the posterior inferior cerebellar artery. Mild irregularity of the basilar artery. 4. Completed infarct in the posterior aspect of the left MCA territory. No evidence of ischemic penumbra. rCBF <30% : 0 cc TMax > 6s: 2 cc Mismatch volume: 2 cc Mismatch Ratio: NA 5. Additional chronic and incidental findings, as above.    CT head (6/26): Evolving left MCA territory infarct.  Small hyperdense foci within the infarct may reflect contrast staining from interval angiography versus punctate hemorrhage.  Short interval follow-up recommended.    CT head (6/27): Acute/subacute left MCA distribution infarct with 3 mm midline shift.. No definite hemorrhage    PRIOR LEVEL OF FUNCTION AND LIVING ENVIRONMENT     Prior Level of Function    Flowsheet Row Most Recent Value   Ambulation independent   Transferring independent   Toileting independent   Bathing independent   Dressing independent   Eating independent   IADLs independent   Communication understands/communicates without difficulty   Swallowing swallows foods/liquids without difficulty   Baseline Diet/Method of Nutritional Intake thin liquids, regular   Assistive Device Currently Used at Home No known   Prior Level of Function Comment PLOF provided by patient's friend, Samara, due to patient with severe aphasia   Assistive Device/Animal Currently Used at Home none        Prior Living Environment    Flowsheet Row Most Recent Value    People in Home alone   Current Living Arrangements home   Living Environment Comment Per patient's friend, Samara, patient lives alone in a 2SH        Occupational Profile    Flowsheet Row Most Recent Value   Successful Occupations Indep baseline   Performance Patterns Has a cat, Loan   Environmental Supports and Barriers Friends nearby, lives alone        VITALS AND PAIN     OT Vitals    Date/Time Pulse SpO2 BP MAP BP Location BP Method Pt Position [facility]   06/27/23 1405 85 95 % 141/63 90 mmHg Right upper arm Automatic Lying KJB   06/27/23 1420 89 96 % 144/68 98 mmHg Right upper arm Automatic Sitting KJB      OT Pain    Date/Time Pain Type CNPI: Nonverbal Movement CNPI: Nonverbal Rest CNPI: Facial Movement CNPI: Facial Rest CNPI: Bracing Movement CNPI: Bracing Rest CNPI: Restlessness Movement CNPI: Restlessness Rest CNPI: Rubbing Movement CNPI: Rubbing Rest CNPI: Vocal Movement CNPI: Vocal Rest CNPI: Score [facility]   06/27/23 1405 Pain Assessment 0 0 0 0 0 0 0 0 0 0 0 0 0 KJB   06/27/23 1420 Post Activity 0 0 0 0 0 0 0 0 0 0 0 0 0 KJB        Objective   OBJECTIVE     Start time:  1403  End time:  1423  Session Length: 20 min  Mode of Treatment: occupational therapy, co-treatment (patient tolerance, OT assessing ADLs and cognition)    General Observations  Patient received supine, in bed. She was agreeable to therapy, no issues or concerns identified by nurse prior to session.      Precautions: fall, NPO, aspiration       Limitations/Impairments: swallowing, safety/cognitive      OT Eval and Treat - 06/27/23 1403        Cognition    Orientation Status unable/difficult to assess;oriented to;person     Affect/Mental Status flat/blunted affect     Follows Commands follows one-step commands;50-74% accuracy;physical/tactile prompts required;repetition of directions required     Cognitive Function attention deficit;executive function deficit;safety deficit     Attention Deficit moderate deficit;focused/sustained attention      Executive Function Deficit severe deficit;insight/awareness of deficits;judgment;organization/sequencing;problem-solving/reasoning;self-monitoring/self-correction     Safety Deficit severe deficit;awareness of need for assistance;insight into deficits/self-awareness;judgment;problem-solving;impulsivity        Sensory Assessment    Sensory Assessment unable/difficult to assess        Upper Extremity Assessment    UE Assessment ROM and Strength WFL except     General Observations Formal assessment limited by cognition and command following. Visually b/l shoulders and elbows at least 3/5. L wrist/hand at least 3/5        Upper Extremity Range of Motion    Wrist, Right (ROM) 0/5, no active movement observed     Hand, Right (ROM) 0/5, no active movement observed        Motor Skills    Coordination severe impairment;right;upper extremity;fine motor deficit        Bed Mobility    Bed Mobility Activities supine to sit     [place] close supervision     Safety/Cues verbal cues;impulsivity     Assistive Device head of bed elevated     Comment Patient inpulsively attempting to sit EOB without regard for lines. cues for safety and waiting for therapists instructions        Mobility Belt    Mobility Belt Used for All Out of Bed Activity no     Reason Mobility Belt Not Used patient refused        Sit/Stand Transfer    Surface edge of bed;chair with arm rests     [place] moderate assist (50-74% patient effort);1 person assist;additional person to manage equipment     Safety/Cues verbal cues;impulsivity;technique     Assistive Device none     Transfer Comments HHA        Functional Mobility    Distance few steps at bedside     Functional Mobility [place] moderate assist (50-74% patient effort);1 person assist;additional person to manage equipment     Safety/Cues verbal cues;technique;impulsivity     Assistive Device none     Functional Mobility Comments HHA        Lower Body Dressing    Tasks don;socks      [place] dependent (less than 25% patient effort)     Position edge of bed sitting     Adaptive Equipment none        Grooming    Tasks brushes/davis hair     [place] dependent (less than 25% patient effort)     Position supported sitting     Adaptive Equipment none     Comment Patient unable to utilize brush for hair despite cues. Patient waving brush in air at OT and PT.        Balance    Static Sitting Balance WFL     Dynamic Sitting Balance mild impairment     Sit to Stand Dynamic Balance mild impairment     Static Standing Balance mild impairment;supported     Dynamic Standing Balance mild impairment;supported     Comment, Balance HHA        Impairments/Safety Issues    Impairments Affecting Function balance;cognition;coordination;strength;motor control     Cognitive Impairments, Safety/Performance awareness, need for assistance;insight into deficits/self-awareness;judgment;problem-solving/reasoning;impulsivity                               Education Documentation  Rehabilitation Therapy, taught by Kita Cochran OT at 6/27/2023  4:42 PM.  Learner: Patient  Readiness: Acceptance  Method: Explanation, Demonstration  Response: Needs Reinforcement  Comment: Role of OT, POC, bed mobility, functional transfers, safety with mobility, BADL, call bell use, falls prevention, d/c recommendations        Session Outcome  Patient reclined, in chair, leg(s) elevated at end of session, chair alarm on, personal items in reach, call light in reach, all needs met. Nursing notified about patient's performance, patient's position, and patient's response to therapy/activity.    AM-PAC™ - ADL (Current Function)     Putting on/taking off regular lower body clothing 1 - Total   Bathing 1 - Total   Toileting 1 - Total   Putting on/taking off regular upper body clothing 2 - A Lot   Help for taking care of personal grooming 2 - A Lot   Eating meals 1 - Total (NPO)   AM-PAC™ ADL Score 8      ASSESSMENT AND PLAN      Progress Summary  OT evaluation completed for 79 yo adm with acute CVA. At baseline, patient lives alone and is independent with ADLs, IADLs, and mobility. Patient presents with functional impairments due to deficits in cognition, balance, coordination, strength, motor control, severe aphasia. Patient currently CS bed mobility, Mod A transfers and steps at bedside, Dep grooming and dressing. Patient will benefit from continued OT services in order to return to [facility]. Recommend acute inpatient rehab at d/c. OT to follow.    Patient/Family Therapy Goal Statement: unable to state due to aphasia    OT Plan    Flowsheet Row Most Recent Value   Rehab Potential good, to achieve stated therapy goals at 06/27/2023 1403   Therapy Frequency 5 times/wk at 06/27/2023 1403   Planned Therapy Interventions BADL retraining, functional balance retraining, occupation/activity based interventions, patient/caregiver education/training, neuromuscular control/coordination retraining, transfer/mobility retraining, ROM/therapeutic exercise, strengthening exercise at 06/27/2023 1403          OT Discharge Recommendations    Flowsheet Row Most Recent Value   OT Recommended Discharge Disposition acute rehab/Inpatient Rehab Facility at 06/27/2023 1403   Anticipated Equipment Needs At Discharge (OT) other (see comments)  [TBD next LOC] at 06/27/2023 1403               OT Goals    Flowsheet Row Most Recent Value   Bed Mobility Goal 1    Activity/Assistive Device bed mobility activities, all at 06/27/2023 1403   [place] modified independence at 06/27/2023 1403   Time Frame by discharge at 06/27/2023 1403   Progress/Outcome goal ongoing at 06/27/2023 1403   Transfer Goal 1    Activity/Assistive Device sit-to-stand/stand-to-sit, bed-to-chair/chair-to-bed, toilet at 06/27/2023 1403   [place] minimum assist (75% or more patient effort) at 06/27/2023 1403   Time Frame by discharge at 06/27/2023 1403   Progress/Outcome goal ongoing at  06/27/2023 1403   Dressing Goal 1    Activity/Adaptive Equipment dressing skills, all at 06/27/2023 1403   [place] minimum assist (75% or more patient effort) at 06/27/2023 1403   Time Frame by discharge at 06/27/2023 1403   Progress/Outcome goal ongoing at 06/27/2023 1403   Toileting Goal 1    Activity/Assistive Device toileting skills, all at 06/27/2023 1403   [place] minimum assist (75% or more patient effort) at 06/27/2023 1403   Time Frame by discharge at 06/27/2023 1403   Progress/Outcome goal ongoing at 06/27/2023 1403   Grooming Goal 1    Activity/Assistive Device grooming skills, all at 06/27/2023 1403   [place] minimum assist (75% or more patient effort) at 06/27/2023 1403   Time Frame by discharge at 06/27/2023 1403   Progress/Outcome goal ongoing at 06/27/2023 1403

## 2023-06-27 NOTE — PROGRESS NOTES
Physical Therapy -  Initial Evaluation     Patient: Jennifer Sams  Location: [facility] Care Unit 3230  MRN: [id number]  Today's date: 6/27/2023    HISTORY OF PRESENT ILLNESS     Jennifer is a 78 y.o. female admitted on 6/26/2023 with NSTEMI (non-ST elevated myocardial infarction) (CMS/[facility]) [I21.4]  Coronary artery disease involving native coronary artery of native heart with angina pectoris (CMS/[facility]) [I25.119]  Cerebrovascular accident (CVA), unspecified mechanism (CMS/HCC) [I63.9]. Principal problem is Cerebrovascular accident (CVA), unspecified mechanism (CMS/HCC).    Past Medical History  Jennifer has a past medical history of AAA (abdominal aortic aneurysm), Arthritis, Elevated blood pressure reading without diagnosis of hypertension (4/19/2019), Epistaxis (1/6/2020), GERD (gastroesophageal reflux disease) (4/19/2019), Glaucoma (4/19/2019), Heart murmur, Hiatal hernia, History of hip replacement, total, right (4/19/2019), History of rheumatic fever as a child (4/19/2019), Hypercholesterolemia (4/19/2019), Ischemic cardiomyopathy (5/9/2019), Kidney cysts, Osteoarthritis of multiple joints (4/19/2019), Osteoporosis, Pacemaker (12/9/2019), Raynaud's disease (4/19/2019), RSD (reflex sympathetic dystrophy) (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    History of Present Illness   78 y.o. female with a past medical history of AAA GERD CAD with a pacemaker. Lives at alone asked patient does still his own ADLs.  Patient has no family members except for friends who are basically are power of .  According to friend patient has not been in contact for the past 2 to 3 days and decided to visit patient.  Patient confused combative standing but on a leaning to the right side.  Patient needed to have EMS arrived as patient became combative needed to be bearhug throughout the trip.  Patient here in the ED was  aphasic with right-sided weakness patient's blood pressure was 190/84 temperature was 98.3 BUN was 14 glucose 117 hemoglobin was 15.8    CT brain perfusion: 1. Atherosclerosis at the carotid bifurcations with mild luminal narrowing measuring approximately 25% on the right and 20% on the left using NASCET criteria. Patent cervical carotid and vertebral arteries. 2. Termination of flow in an M3 branch of the left middle cerebral artery. 3. Intracranial atherosclerosis with significant narrowing involving the left V4 segment after the takeoff of the posterior inferior cerebellar artery. Mild irregularity of the basilar artery. 4. Completed infarct in the posterior aspect of the left MCA territory. No evidence of ischemic penumbra. rCBF <30% : 0 cc TMax > 6s: 2 cc Mismatch volume: 2 cc Mismatch Ratio: NA 5. Additional chronic and incidental findings, as above.    CT head (6/26): Evolving left MCA territory infarct.  Small hyperdense foci within the infarct may reflect contrast staining from interval angiography versus punctate hemorrhage.  Short interval follow-up recommended.    CT head (6/27): Acute/subacute left MCA distribution infarct with 3 mm midline shift.. No definite hemorrhage    PRIOR LEVEL OF FUNCTION AND LIVING ENVIRONMENT     Prior Level of Function    Flowsheet Row Most Recent Value   Ambulation independent   Transferring independent   Toileting independent   Bathing independent   Dressing independent   Eating independent   IADLs independent   Communication understands/communicates without difficulty   Swallowing swallows foods/liquids without difficulty   Baseline Diet/Method of Nutritional Intake thin liquids, regular   Assistive Device Currently Used at Home No known   Prior Level of Function Comment PLOF provided by patient's friend, Samara, due to patient with severe aphasia   Assistive Device/Animal Currently Used at Home none        Prior Living Environment    Flowsheet Row Most Recent Value    People in Home alone   Current Living Arrangements home   Living Environment Comment Per patient's friend, Samara, patient lives alone in a 2SH        VITALS AND PAIN     PT Vitals    Date/Time Pulse SpO2 BP MAP BP Location BP Method Pt Position [facility]   06/27/23 1405 85 95 % 141/63 90 mmHg Right upper arm Automatic Lying KJB   06/27/23 1420 89 96 % 144/68 98 mmHg Right upper arm Automatic Sitting KJB      PT Pain    Date/Time Pain Type CNPI: Nonverbal Movement CNPI: Nonverbal Rest CNPI: Facial Movement CNPI: Facial Rest CNPI: Bracing Movement CNPI: Bracing Rest CNPI: Restlessness Movement CNPI: Restlessness Rest CNPI: Rubbing Movement CNPI: Rubbing Rest CNPI: Vocal Movement CNPI: Vocal Rest CNPI: Score [facility]   06/27/23 1405 Pain Assessment 0 0 0 0 0 0 0 0 0 0 0 0 0 KJB   06/27/23 1420 Post Activity 0 0 0 0 0 0 0 0 0 0 0 0 0 KJB        Objective   OBJECTIVE     Start time:  1404  End time:  1424  Session Length: 20 min  Mode of Treatment: co-treatment, physical therapy (PT assessed balance and gait; OT assessed ADLs. Pt with poor activity tolerance)    General Observations  Patient received supine, in bed. She was agreeable to therapy, no issues or concerns identified by nurse prior to session. +wall monitoring, +IV, NAD    Precautions: fall, NPO, aspiration       Limitations/Impairments: swallowing, safety/cognitive   Services  Do You Speak a Language Other Than English at Home?: no      PT Eval and Treat - 06/27/23 1405        Cognition    Orientation Status unable/difficult to assess;oriented to;person     Affect/Mental Status flat/blunted affect     Follows Commands follows one-step commands;50-74% accuracy;verbal cues/prompting required     Cognitive Function attention deficit;executive function deficit;safety deficit     Executive Function Deficit severe deficit;judgment;problem-solving/reasoning;organization/sequencing;insight/awareness of deficits     Comment, Cognition Pt non verbal and limited  following commands-often with tactile cuing/physical assistance        Vision Assessment/Intervention    Vision Assessment unable/difficult to assess        Sensory Assessment    Sensory Assessment unable/difficult to assess        Lower Extremity Assessment    LE Assessment ROM and Strength WFL        Bed Mobility    Bed Mobility Activities left;supine to sit     [place] supervision     Safety/Cues verbal cues;sequencing;technique;impulsivity     Assistive Device head of bed elevated     Comment Pt impulsively attempting to sit at EOB with poor safety awareness or awareness for IV lines.        Mobility Belt    Mobility Belt Used for All Out of Bed Activity no     Reason Mobility Belt Not Used patient refused        Sit/Stand Transfer    Surface edge of bed;chair with arm rests     [place] moderate assist (50-74% patient effort);additional person to manage equipment     Safety/Cues verbal cues;impulsivity     Assistive Device none     Transfer Comments HHA        Gait Training    [place], Gait moderate assist (50-74% patient effort)     Safety/Cues increased time to complete;impulsivity;sequencing;technique     Distance in Feet 4 feet     Pattern step-through     Deviations/Abnormal Patterns base of support, wide;weight shifting decreased;stride length decreased     Comment (Gait/Stairs) poor command following        Stairs Training    Comment TBA as tolerated        Balance    Static Sitting Balance WFL     Dynamic Sitting Balance mild impairment;sitting, edge of bed     Sit to Stand Dynamic Balance mild impairment     Static Standing Balance mild impairment     Dynamic Standing Balance mild impairment     Comment, Balance HHA        Impairments/Safety Issues    Impairments Affecting Function balance;cognition;coordination;endurance/activity tolerance;range of motion (ROM);postural/trunk control;pain;strength     Cognitive Impairments, Safety/Performance impulsivity;insight into  deficits/self-awareness     Functional Endurance Fair-                               Education Documentation  Treatment Plan, taught by Lidia Ramos, PT at 6/27/2023  4:45 PM.  Learner: Patient  Readiness: Acceptance  Method: Explanation  Response: Needs Reinforcement  Comment: Pt was educated regarding the role of PT in acute care, energy conservation, self-awareness, and POC        Session Outcome  Patient reclined, in chair, leg(s) elevated at end of session, chair alarm on, all needs met, call light in reach, personal items in reach. Nursing notified about change in vital signs, patient's performance, patient's position, and patient's response to therapy/activity.    AM-PAC™ - Mobility (Current Function)     Turning form your back to your side while in flat bed without using bedrails 3 - A Little   Moving from lying on your back to sitting on the side of a flat bed without using bedrails 3 - A Little   Moving to and from a bed to a chair 3 - A Little   Standing up from a chair using your arms 2 - A Lot   To walk in a hospital room 2 - A Lot   Climbing 3-5 steps with a railing 2 - A Lot   AM-PAC™ Mobility Score 15      ASSESSMENT AND PLAN     Progress Summary  [facility] 15/24 Pt presents with requiring supervision for bed mobility, and ModA for transfers, and short steps for ambulation. Pt continues to be limited in functional mobility 2/2  decreased generalized strength, impaired standing dynamic balance, decreased activity tolerance indicating need for skilled PT in Acute inpatient Rehab.    Patient/Family Therapy Goals Statement: none stated    PT Plan    Flowsheet Row Most Recent Value   Rehab Potential good, to achieve stated therapy goals at 06/27/2023 1405   Therapy Frequency 5 times/wk at 06/27/2023 1405   Planned Therapy Interventions balance training, neuromuscular re-education, bed mobility training, ROM (range of motion), stair training, transfer training, patient/family education, gait training, home  exercise program, postural re-education, strengthening at 06/27/2023 1405          PT Discharge Recommendations    Flowsheet Row Most Recent Value   PT Recommended Discharge Disposition acute rehab/Inpatient Rehab Facility at 06/27/2023 1405   Anticipated Equipment Needs at Discharge (PT) none  [TBD] at 06/27/2023 1405   Patient/Family Therapy Goals Statement none stated at 06/27/2023 1405               PT Goals    Flowsheet Row Most Recent Value   Bed Mobility Goal 1    Activity/Assistive Device bed mobility activities, all at 06/27/2023 1405   [place] modified independence at 06/27/2023 1405   Time Frame by discharge at 06/27/2023 1405   Progress/Outcome goal ongoing at 06/27/2023 1405   Transfer Goal 1    Activity/Assistive Device all transfers at 06/27/2023 1405   [place] modified independence at 06/27/2023 1405   Time Frame by discharge at 06/27/2023 1405   Progress/Outcome goal ongoing at 06/27/2023 1405   Gait Training Goal 1    Activity/Assistive Device gait (walking locomotion) at 06/27/2023 1405   [place] modified independence at 06/27/2023 1405   Distance 125 at 06/27/2023 1405   Time Frame by discharge at 06/27/2023 1405   Progress/Outcome goal ongoing at 06/27/2023 1405   Stairs Goal 1    Activity/Assistive Device stairs, all skills at 06/27/2023 1405   [place] modified independence at 06/27/2023 1405   Number of Stairs 10 at 06/27/2023 1405   Time Frame by discharge at 06/27/2023 1405   Progress/Outcome goal ongoing at 06/27/2023 1405

## 2023-06-27 NOTE — ASSESSMENT & PLAN NOTE
non-STEMI 4/26/19, three-vessel CAD; status post FRANK x 2 LAD 4/27/19 + FRANK x 4 RCA 4/29/19 + FRANK x 1 LCX OM2 5/2/19    Continue ASA and Statin.  Plavix was discontinued due to epistaxis.

## 2023-06-27 NOTE — CONSULTS
Cardiology Consult Note          Reason for consult: Acute Stroke     Subjective     Jennifer Sams is a 78 y.o. female with PMHx of Ischemic Cardiomyopathy, Heart block s/p Pacemaker ([employer]tronic 11/18/2019), CAD, NSTEMI (4/2019) with FRANK X 2 to LAD, FRANK X 4 to RCA and FRANK X1 to left circumflex (on ASA only, plavix stopped due to epistaxis), AAA, GERD who was admitted for altered mental status. Patient lives alone and was found by her friend to be confused, combative and standing leaning to the right. In ED, She was noted to be aphasic with right sided weakness and hypertensive. CT of head showed acute/subacyte Infract in the left MCA with 3mm midline shift, no definite hemorrhage on repeat CT scan. Initial CT scan with possible punctate hemorrhage.   Patient is awake and alert and was nodding intermittently to questions but not the best historian given her acute CVA.     Medical History:   Past Medical History:   Diagnosis Date   • AAA (abdominal aortic aneurysm)    • Arthritis    • Elevated blood pressure reading without diagnosis of hypertension 4/19/2019   • Epistaxis 1/6/2020   • GERD (gastroesophageal reflux disease) 4/19/2019   • Glaucoma 4/19/2019   • Heart murmur    • Hiatal hernia    • History of hip replacement, total, right 4/19/2019    Right hip 9/ 2016 and revision 2017    • History of rheumatic fever as a child 4/19/2019   • Hypercholesterolemia 4/19/2019   • Ischemic cardiomyopathy 5/9/2019   • Kidney cysts    • Osteoarthritis of multiple joints 4/19/2019   • Osteoporosis    • Pacemaker 12/9/2019   • Raynaud's disease 4/19/2019   • RSD (reflex sympathetic dystrophy) 4/19/2019    Of left foot   • SOB (shortness of breath) 12/10/2019   • Status post insertion of drug eluting coronary artery stent 5/9/2019       Surgical History:   Past Surgical History:   Procedure Laterality Date   • CHOLECYSTECTOMY OPEN     • CORONARY ANGIOPLASTY WITH STENT PLACEMENT  04/29/2019     of LAD   • CORONARY ANGIOPLASTY WITH STENT PLACEMENT  04/30/2019    of RCA   • DILATION AND CURETTAGE OF UTERUS     • EYE SURGERY      RIGHT CATARACT   • FOOT SURGERY Left    • JOINT REPLACEMENT Right 09/2016    total hip   • REVISION TOTAL HIP ARTHROPLASTY Right 2017   • TONSILLECTOMY         Social History:   Social History     Social History Narrative   • Not on file      Social History     Tobacco Use   Smoking Status Never   Smokeless Tobacco Never       Family History:.  Family History   Problem Relation Age of Onset   • Congenital heart disease Biological Mother         noted at autopsy   • Pulmonary fibrosis Biological Father    • Heart attack Paternal Grandfather          Allergies: .  Allergies   Allergen Reactions   • Ace Inhibitors Other (see comments)     cough   • Atorvastatin      Nausea and can'trecall   • Brilinta [Ticagrelor]      SOB   • Codeine      Nausea and Vomiting   • Dilaudid [Hydromorphone]      Nausea & vomiting   • Morphine Sulfate Nausea Only and GI intolerance   • Onion      Severe abdominal pain   • Oxycodone      Nausea & vomiting, dizziness         Current Facility-Administered Medications   Medication Dose Route Frequency Provider Last Rate Last Admin   • acetaminophen (TYLENOL) tablet 650 mg  650 mg oral q6h PRN Kelby Martinez MD       • [Provider Managed Hold] aspirin enteric coated tablet 81 mg  81 mg oral Daily Kelby Martinez MD       • aspirin suppository 300 mg  300 mg rectal Daily Richard Pandya MD   300 mg at 06/27/23 0920   • glucose chewable tablet 16-32 g of dextrose  16-32 g of dextrose oral PRN Kelby Martinez MD        Or   • dextrose 40 % oral gel 15-30 g of dextrose  15-30 g of dextrose oral PRN Kelby Martinez MD        Or   • glucagon (GLUCAGEN) injection 1 mg  1 mg intramuscular PRN Kelby Martinez MD        Or   • dextrose 50 % in water (D50) injection 12.5 g  25 mL intravenous PRN Kelby Martinez MD       • enoxaparin (LOVENOX) syringe 40 mg  40 mg  "subcutaneous Daily (6p) Richard Pandya MD       • hydrALAZINE (APRESOLINE) injection 10 mg  10 mg intravenous q4h PRN Kelby Martinez MD   10 mg at 06/27/23 0937   • LORazepam (ATIVAN) injection 1 mg  1 mg intravenous Once in imaging Kelby Martinez MD       • magnesium sulfate IVPB 2g in 50 mL NSS/D5W/SWFI  2 g intravenous PRN Kelby Martinez MD       • ondansetron (ZOFRAN) injection 4 mg  4 mg intravenous q8h PRN Kelby Martinez MD       • potassium chloride (KLOR-CON M) ER tablet (particles/crystals) 20 mEq  20 mEq oral PRN Kelby Martinez MD       • potassium chloride (KLOR-CON M) ER tablet (particles/crystals) 40 mEq  40 mEq oral PRN Kelby Martinez MD       • potassium chloride 20 mEq in 100 mL IVPB  (premix)  20 mEq intravenous PRN Kelby Martinez MD       • potassium chloride 20 mEq in 100 mL IVPB  (premix)  20 mEq intravenous PRN Kelby Martinez MD        And   • potassium chloride 20 mEq in 100 mL IVPB  (premix)  20 mEq intravenous PRN Kelby Martinez MD       • senna (SENOKOT) tablet 1 tablet  1 tablet oral 2x daily PRN Kelby Martinez MD       • sodium chloride 0.9 % infusion   intravenous Continuous Kelby Martinez MD 80 mL/hr at 06/27/23 0859 New Bag at 06/27/23 0859         Review of Systems   Reason unable to perform ROS: Patient with expressive and receptive aphasia.         Objective       Visit Vitals  BP (!) 142/63 (BP Location: Right upper arm, Patient Position: Lying)   Pulse 72   Temp 36.9 °C (98.5 °F)   Resp 18   Ht 1.702 m (5' 7\")   Wt 64 kg (141 lb)   SpO2 96%   BMI 22.08 kg/m²       Physical Exam  Constitutional:       Appearance: Normal appearance.      Comments: She was nodding to questions but inappropriately at times.    HENT:      Head: Normocephalic and atraumatic.   Cardiovascular:      Rate and Rhythm: Normal rate and regular rhythm.      Pulses: Normal pulses.      Heart sounds: Normal heart sounds.   Pulmonary:      Effort: Pulmonary effort is normal.      " Breath sounds: Normal breath sounds.   Abdominal:      Palpations: Abdomen is soft.   Musculoskeletal:      Right lower leg: No edema.      Left lower leg: No edema.   Skin:     General: Skin is warm and dry.   Neurological:      Mental Status: She is alert.      Comments: She has right sided facial droop with right sided weakness. She has expressive and receptive aphasia.    Psychiatric:      Comments: Not agitated.           Labs   Lab Results   Component Value Date    WBC 8.34 06/27/2023    HGB 14.3 06/27/2023    HCT 43.0 06/27/2023     06/27/2023    CHOL 178 06/26/2023    TRIG 93 06/26/2023    HDL 71 06/26/2023    LDLCALC 88 06/26/2023    ALT 16 06/26/2023    AST 28 06/26/2023     06/27/2023    K 3.4 (L) 06/27/2023     (H) 06/27/2023    CREATININE 0.8 06/27/2023    BUN 13 06/27/2023    CO2 23 06/27/2023    TSH 2.94 04/27/2022    INR 1.1 06/27/2023    GLUCOSE 107 (H) 06/27/2023    HGBA1C 5.4 06/26/2023       Imaging  CT head (6/27/2023)    IMPRESSION: Acute/subacute left MCA distribution infarct with 3 mm midline  shift.. No definite hemorrhage     Cardiac Imaging    TRANSTHORACIC ECHO (TTE) COMPLETE 03/28/2023    Interpretation Summary  •  Left Ventricle: Normal ventricle size. Normal wall thickness. Mildly decreased systolic function. Estimated EF 45-50%. Basal and mid inferolateral akinesis, mid and apical anteroseptal/septal akinesis.  •  Aortic Valve: Valve not well seen but likely trileaflet.  Sclerotic leaflets. No regurgitation. No stenosis.  •  Aorta: Aortic root normal.  •  Right Ventricle: Ventricle not well visualized but probably normal size and at most mildly dilated. Catheter present. Normal systolic function.  •  Tricuspid Valve: Mild regurgitation. Estimated RVSP = 26 mmHg.  •  Pericardium: Small anterior pericardial effusion  •  Left Atrium: Normal sized atrium.  •  Right Atrium: Normal sized atrium.  •  Mitral Valve: No regurgitation.  •  Pulmonic Valve: Valve not well  visualized. No regurgitation.  •  IVC/SVC: Normal sized inferior vena cava. Inferior vena cava collapses >50% during inspiration.  •  Compared with February 2022, no significant change.  •  I personally reviewed results with her in the office today.    [facility] 2/12/21:  1. Normal right and left heart filling pressures (RA 3, PA 20/5 with mean of 11 mmHg, and PCW 6 mm Hg).  2. Patent prior stents in the mid and distal LAD.  3. 50% ostial Cx stenosis, not functionally significant by iFR (0.95). Patent proximal Cx stent.  4. Patent mid and distal RCA stents.       ECG   6/26/2023- Ventricular paced with prolonged AV conduction     Echo (6/27/2023)- Pending     Telemetry  V paced with underlying sinus rhythm     Assessment & Plan    Cardiac pacemaker  Assessment & Plan  Hx of heart block. S/p medtronic pacemaker.   V paced on telemetry   Follows with Dr. Carolina     No changes to management     Coronary artery disease involving native coronary artery of native heart with angina pectoris (CMS/[facility])  Assessment & Plan  non-STEMI 4/26/19, three-vessel CAD; status post FRANK x 2 LAD 4/27/19 + FRANK x 4 RCA 4/29/19 + FRANK x 1 LCX OM2 5/2/19    Continue ASA and Statin.  Plavix was discontinued due to epistaxis.     Ischemic cardiomyopathy  Assessment & Plan  EF 45-50%, follows with Dr. Salter. In past recommended Entresto ( was cost prohibitive) and then Valsartan but not started due to concerns of dizziness.     Reassess EF on repeat echocardiogram. Once outside the permissive hypertensive window and NPO status, would start Valsartan       * Cerebrovascular accident (CVA), unspecified mechanism (CMS/[facility])  Assessment & Plan  Patient with acute CVA in Left MCA territory.  There was a concern for possible bleed on initial CT scan but repeat with no hemorrhage but 3mm midline shift    Cont ASA and Statin.  Management per Neurology   No evidence of atrial fibrillation on telemetry, monitor  echocardiogram          This patient note has  been dictated using speech recognition software. Inadvertent speech recognition errors should be disregarded. Please do not hesitate to call my office for clarifications.    ARIAN Rodas  6/27/2023  11:21 AM

## 2023-06-27 NOTE — ASSESSMENT & PLAN NOTE
Echo 6/27/23 stable EF 45-50%, follows with Dr. Salter. In past recommended Entresto ( was cost prohibitive) and then Valsartan but not started due to concerns of dizziness.      Once outside the permissive hypertensive window and NPO status, would start Valsartan

## 2023-06-27 NOTE — ASSESSMENT & PLAN NOTE
Hx of heart block. S/p medtronic pacemaker.   V paced on telemetry   Follows with Dr. Carolina     No changes to management

## 2023-06-27 NOTE — H&P
Hospital Medicine Service -  History & Physical        CHIEF COMPLAINT     Chief Complaint   Patient presents with   • Stroke        HISTORY OF PRESENT ILLNESS      78 y.o. female with a past medical history of AAA GERD CAD with a pacemaker. Lives at alone asked patient does still his own ADLs.  Patient has no family members except for friends who are basically are power of .  According to friend patient has not been in contact for the past 2 to 3 days and decided to visit patient.  Patient confused combative standing but on a leaning to the right side.  Patient needed to have EMS arrived as patient became combative needed to be bearhug throughout the trip.  Patient here in the ED was aphasic with right-sided weakness patient's blood pressure was 190/84 temperature was 98.3 BUN was 14 glucose 117 hemoglobin was 15.8 CT of the head showed an acute infarct in the posterior aspect of the left seen MCA with mild bleeding via CTA angiogram of the brain.  ED physician contacted neurology and recommends patient to be admitted to PCU with a repeat CT in the head for further evaluation treatment    PAST MEDICAL AND SURGICAL HISTORY      Past Medical History:   Diagnosis Date   • AAA (abdominal aortic aneurysm)    • Arthritis    • Elevated blood pressure reading without diagnosis of hypertension 4/19/2019   • Epistaxis 1/6/2020   • GERD (gastroesophageal reflux disease) 4/19/2019   • Glaucoma 4/19/2019   • Heart murmur    • Hiatal hernia    • History of hip replacement, total, right 4/19/2019    Right hip 9/ 2016 and revision 2017    • History of rheumatic fever as a child 4/19/2019   • Hypercholesterolemia 4/19/2019   • Ischemic cardiomyopathy 5/9/2019   • Kidney cysts    • Osteoarthritis of multiple joints 4/19/2019   • Osteoporosis    • Pacemaker 12/9/2019   • Raynaud's disease 4/19/2019   • RSD (reflex sympathetic dystrophy) 4/19/2019    Of left foot   • SOB (shortness of breath) 12/10/2019   • Status post  insertion of drug eluting coronary artery stent 5/9/2019       Past Surgical History:   Procedure Laterality Date   • CHOLECYSTECTOMY OPEN     • CORONARY ANGIOPLASTY WITH STENT PLACEMENT  04/29/2019    of LAD   • CORONARY ANGIOPLASTY WITH STENT PLACEMENT  04/30/2019    of RCA   • DILATION AND CURETTAGE OF UTERUS     • EYE SURGERY      RIGHT CATARACT   • FOOT SURGERY Left    • JOINT REPLACEMENT Right 09/2016    total hip   • REVISION TOTAL HIP ARTHROPLASTY Right 2017   • TONSILLECTOMY         MEDICATIONS      Prior to Admission medications    Medication Sig Start Date End Date Taking? Authorizing Provider   amoxicillin (AMOXIL) 500 mg capsule Take 4 capsules (2,000 mg total) by mouth as needed (one hour prior to dental cleaning). 9/13/22   Loretta Long CRNP   aspirin (ASPIR-81) 81 mg enteric coated tablet Take 1 tablet (81 mg total) by mouth daily. 4/2/21   Loretta Long CRNP   cycloSPORINE (RESTASIS) 0.05 % ophthalmic emulsion Administer 1 drop into both eyes 2 (two) times a day.    Provider, MD Amarilis   nitroglycerin (NITROSTAT) 0.4 mg SL tablet Place 1 tablet (0.4 mg total) under the tongue every 5 (five) minutes as needed for chest pain. 4/19/19   Tomasz Courtney MD   pantoprazole (PROTONIX) 40 mg EC tablet Take 40 mg by mouth daily.    Provider, MD Amarilis   rosuvastatin (CRESTOR) 20 mg tablet TAKE 1 TABLET BY MOUTH EVERY DAY 6/14/23   Jermaine Salter MD       ALLERGIES      Ace inhibitors, Atorvastatin, Brilinta [ticagrelor], Codeine, Dilaudid [hydromorphone], Morphine sulfate, Onion, and Oxycodone    FAMILY HISTORY      Family History   Problem Relation Age of Onset   • Congenital heart disease Biological Mother         noted at autopsy   • Pulmonary fibrosis Biological Father    • Heart attack Paternal Grandfather        SOCIAL HISTORY      Social History     Socioeconomic History   • Marital status: Single   Tobacco Use   • Smoking status: Never   • Smokeless tobacco: Never   Vaping Use    • Vaping Use: Never used   Substance and Sexual Activity   • Alcohol use: Not Currently   • Drug use: No   • Sexual activity: Defer       REVIEW OF SYSTEMS        Review of Systems  Unable to obtain patient is aphasic agitated crying        PHYSICAL EXAMINATION      Temp:  [36.8 °C (98.3 °F)] 36.8 °C (98.3 °F)  Heart Rate:  [76-90] 76  Resp:  [18-20] 18  BP: (170-190)/(84-98) 170/96  There is no height or weight on file to calculate BMI.    General exam : appears age stated, well nourished, not in distress  Head: atraumatic, normocephalic  Eyes : PERRLA, EOMI, no pallor, no icterus left facial droop  ENT: no lesions, oropharynx pink, mucous membranes moist   Neck: supple, no Lymph nodes, no Thyromegaly, no JVD   CVS : normal rate, normal rhythm, S1 and S2 heard, 2/6 SE murmurs, rubs or gallops  Resp:normal accessory muscle usage, clear to auscultation Bilaterally  Abdomen : soft, Nt, BS +, no organomegaly   Extremities : no edema, no cyanosis   MSK: no DJD, no joint swellings, no joint tenderness   Skin: intact, warm, no rash  Neuro: AAO x1, unable to obtain as patient was tearful combative agitated      LABS / IMAGING / EKG        Labs  CBC Results       06/26/23 04/27/22 02/04/22     1524 1322 1149    WBC 9.44 5.1 5.8    RBC 5.16 4.76 4.54    HGB 15.8 12.7 13.0    HCT 47.3 39.6 40.5    MCV 91.7 83.2 89.2    MCH 30.6 26.7 28.6    MCHC 33.4 32.1 32.1     223 195        CMP Results       06/26/23 03/31/23 04/27/22     1524 1150 1322     142 142    K 4.1 4.0 3.7    Cl 111 108 107    CO2 21 27 27    Glucose 117 97 73    BUN 14 17 15    Creatinine 0.8 0.83 0.83    Calcium 9.7 9.2 9.4    Anion Gap 10 -- --    AST 28 24 20    ALT 16 14 12    Albumin 4.6 4.1 4.3    EGFR >60.0 72 68       79         Comment for K at 1524 on 06/26/23: Results obtained on plasma. Plasma Potassium values may be up to 0.4 mEQ/L less than serum values. The differences may be greater for patients with high platelet or white cell  counts.    Comment for Glucose at 1150 on 03/31/23:               Fasting reference interval         Comment for Glucose at 1322 on 04/27/22:           Non-fasting reference interval         Comment for Creatinine at 1322 on 04/27/22: For patients >49 years of age, the reference limit  for Creatinine is approximately 13% higher for people  identified as -American.         Comment for EGFR at 1150 on 03/31/23: The eGFR is based on the CKD-EPI 2021 equation. To calculate   the new eGFR from a previous Creatinine or Cystatin C  result, go to https://www.kidney.org/professionals/  kdoqi/gfr%5Fcalculator            Troponin I Results       06/26/23 01/17/20 11/13/19     1524 1836 0452    Troponin I -- <0.02 <0.03    HS Troponin I 6.9 -- --        Microbiology Results     ** No results found for the last 720 hours. **        UA Results       06/26/23     1936    Color Yellow    Clarity Clear    Glucose Negative    Bilirubin Negative    Ketones +2    Sp Grav >1.035    Blood Negative    Ph 6.0    Protein +1    Urobilinogen 0.2    Nitrite Negative    Leuk Est +1    WBC 0 TO 3    RBC 0 TO 4    Bacteria None Seen         Comment for Ketones at 1936 on 06/26/23: Free sulfhydryl drugs such as Mesna, Capoten, and Acetylcysteine (Mucomyst) may cause false positive ketonuria.    Comment for Blood at 1936 on 06/26/23: The sensitivity of the occult blood test is equivalent to approximately 4 intact RBC/HPF.          Imaging  ECG 12 lead         X-RAY CHEST 1 VIEW   Final Result   IMPRESSION:   No active disease in the chest.         CT ANGIOGRAPHY HEAD/NECK WITH AND WITHOUT IV CONTRAST   Final Result   IMPRESSION:   1. Atherosclerosis at the carotid bifurcations with mild luminal narrowing   measuring approximately 25% on the right and 20% on the left using NASCET   criteria. Patent cervical carotid and vertebral arteries.   2. Termination of flow in an M3 branch of the left middle cerebral artery.   3. Intracranial  atherosclerosis with significant narrowing involving the left V4   segment after the takeoff of the posterior inferior cerebellar artery. Mild   irregularity of the basilar artery.   4. Completed infarct in the posterior aspect of the left MCA territory. No   evidence of ischemic penumbra.   rCBF <30% : 0 cc   TMax > 6s: 2 cc   Mismatch volume: 2 cc   Mismatch Ratio: NA   5. Additional chronic and incidental findings, as above.      In order to accelerate response time, other vascular pathology including   occlusions of more distal arteries are not completely evaluated on this   examination.         Finding:    Stroke alert   Acuity: Critical  Status:  CLOSED      Critical read back was performed and results were read back by Dr. Jordan, on   6/26/2023 at 4:03 PM.      Critical read back was also performed by Dr. Aparicio and the results were   critically read back at 4:07 PM.            CT BRAIN PERFUSION WITH IV CONTRAST   Final Result   IMPRESSION:   1. Atherosclerosis at the carotid bifurcations with mild luminal narrowing   measuring approximately 25% on the right and 20% on the left using NASCET   criteria. Patent cervical carotid and vertebral arteries.   2. Termination of flow in an M3 branch of the left middle cerebral artery.   3. Intracranial atherosclerosis with significant narrowing involving the left V4   segment after the takeoff of the posterior inferior cerebellar artery. Mild   irregularity of the basilar artery.   4. Completed infarct in the posterior aspect of the left MCA territory. No   evidence of ischemic penumbra.   rCBF <30% : 0 cc   TMax > 6s: 2 cc   Mismatch volume: 2 cc   Mismatch Ratio: NA   5. Additional chronic and incidental findings, as above.      In order to accelerate response time, other vascular pathology including   occlusions of more distal arteries are not completely evaluated on this   examination.         Finding:    Stroke alert   Acuity: Critical  Status:  CLOSED       Critical read back was performed and results were read back by Dr. Jordan, on   6/26/2023 at 4:03 PM.      Critical read back was also performed by Dr. Aparicio and the results were   critically read back at 4:07 PM.            CT HEAD STROKE ALERT WITHOUT IV CONTRAST   Final Result   IMPRESSION:   1. Findings highly concerning for an acute infarct in the posterior aspect of   the left MCA territory with an associated thrombosed vessel.   2. Chronic microangiopathy and involutional changes.         Finding:    Stroke alert   Acuity: Critical  Status:  CLOSED      Critical read back was performed and results were read back by ARIAN Washington, on   6/26/2023 at 3:39 PM.         Transthoracic echo (TTE) complete    (Results Pending)   CT HEAD WITHOUT IV CONTRAST    (Results Pending)         ECG/Telemetry  reviewed by me    ASSESSMENT AND PLAN           * Cerebrovascular accident (CVA), unspecified mechanism (CMS/HCC)  Assessment & Plan  Monitor on PCU  Aphasic and right-sided weakness  Neurochecks/NIH  Obtain MRI brain, MRA head/neck  Obtain ECHO  Trend troponin  Check Lipid panel, A1c  Received 325 mg ASA, will continue 81 mg daily  Continue home regimen   IV Hydralazine SBP > 180  Neurology consult    Ischemic cardiomyopathy  Assessment & Plan  Monitor on telemetry  N.p.o. except medication  Trend troponin  Repeat ECG  Received 325 mg ASA, will continue 81 mg daily  Cardiology consult    Elevated blood pressure reading with diagnosis of hypertension  Assessment & Plan  hold home meds  Fall precautions  Aspiration precautions  Consult pt/ot  Consult case mgt  bp parameters for hypotension  dvt prophylaxis with  scd  Pt is full code  Spent 55 minutes assessing planning for patient care        VTE Assessment: Padua    VTE Prophylaxis Plan:   Code Status: Full Code       Kelby Martinez MD  6/26/2023

## 2023-06-27 NOTE — ASSESSMENT & PLAN NOTE
Patient with acute CVA in Left MCA territory.  There was a concern for possible bleed on initial CT scan but repeat with no hemorrhage but 3mm midline shift    -Management per Neurology   -No evidence of atrial fibrillation on telemetry, monitor  -ECHO 6/27/23: LVEF 43%, localized apical aneurysm containing thrombus, possible mild AS, mild MR, normal RV, PASP 34.   -Discontinue heparin infusion.  -Start eliquis 5 mg BID.  -Discontinue aspirin.  -Continue statin therapy.

## 2023-06-27 NOTE — HOSPITAL COURSE
Jennifer is a 78 y.o. female admitted on 6/26/2023 with NSTEMI (non-ST elevated myocardial infarction) (CMS/HCC) [I21.4]  Coronary artery disease involving native coronary artery of native heart with angina pectoris (CMS/HCC) [I25.119]  Cerebrovascular accident (CVA), unspecified mechanism (CMS/HCC) [I63.9]. Principal problem is Cerebrovascular accident (CVA), unspecified mechanism (CMS/HCC).    Past Medical History  Jennifer has a past medical history of AAA (abdominal aortic aneurysm), Arthritis, Elevated blood pressure reading without diagnosis of hypertension (4/19/2019), Epistaxis (1/6/2020), GERD (gastroesophageal reflux disease) (4/19/2019), Glaucoma (4/19/2019), Heart murmur, Hiatal hernia, History of hip replacement, total, right (4/19/2019), History of rheumatic fever as a child (4/19/2019), Hypercholesterolemia (4/19/2019), Ischemic cardiomyopathy (5/9/2019), Kidney cysts, Osteoarthritis of multiple joints (4/19/2019), Osteoporosis, Pacemaker (12/9/2019), Raynaud's disease (4/19/2019), RSD (reflex sympathetic dystrophy) (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    History of Present Illness   78 y.o. female with a past medical history of AAA GERD CAD with a pacemaker. Lives at alone asked patient does still his own ADLs.  Patient has no family members except for friends who are basically are power of .  According to friend patient has not been in contact for the past 2 to 3 days and decided to visit patient.  Patient confused combative standing but on a leaning to the right side.  Patient needed to have EMS arrived as patient became combative needed to be bearhug throughout the trip.  Patient here in the ED was aphasic with right-sided weakness patient's blood pressure was 190/84 temperature was 98.3 BUN was 14 glucose 117 hemoglobin was 15.8    CT brain perfusion: 1. Atherosclerosis at the carotid bifurcations  with mild luminal narrowing measuring approximately 25% on the right and 20% on the left using NASCET criteria. Patent cervical carotid and vertebral arteries. 2. Termination of flow in an M3 branch of the left middle cerebral artery. 3. Intracranial atherosclerosis with significant narrowing involving the left V4 segment after the takeoff of the posterior inferior cerebellar artery. Mild irregularity of the basilar artery. 4. Completed infarct in the posterior aspect of the left MCA territory. No evidence of ischemic penumbra. rCBF <30% : 0 cc TMax > 6s: 2 cc Mismatch volume: 2 cc Mismatch Ratio: NA 5. Additional chronic and incidental findings, as above.    CT head (6/26): Evolving left MCA territory infarct.  Small hyperdense foci within the infarct may reflect contrast staining from interval angiography versus punctate hemorrhage.  Short interval follow-up recommended.    CT head (6/27): Acute/subacute left MCA distribution infarct with 3 mm midline shift.. No definite hemorrhage

## 2023-06-28 PROBLEM — I51.3 LEFT VENTRICULAR APICAL THROMBUS: Status: ACTIVE | Noted: 2023-06-28

## 2023-06-28 LAB
ANION GAP SERPL CALC-SCNC: 11 MEQ/L (ref 3–15)
APTT PPP: 33 SEC (ref 23–35)
BASOPHILS # BLD: 0.03 K/UL (ref 0.01–0.1)
BASOPHILS NFR BLD: 0.4 %
BUN SERPL-MCNC: 12 MG/DL (ref 7–25)
CALCIUM SERPL-MCNC: 8.8 MG/DL (ref 8.6–10.3)
CHLORIDE SERPL-SCNC: 112 MEQ/L (ref 98–107)
CO2 SERPL-SCNC: 19 MEQ/L (ref 21–31)
CREAT SERPL-MCNC: 0.7 MG/DL (ref 0.6–1.2)
DIFFERENTIAL METHOD BLD: ABNORMAL
EOSINOPHIL # BLD: 0.07 K/UL (ref 0.04–0.36)
EOSINOPHIL NFR BLD: 0.9 %
ERYTHROCYTE [DISTWIDTH] IN BLOOD BY AUTOMATED COUNT: 13.2 % (ref 11.7–14.4)
GFR SERPL CREATININE-BSD FRML MDRD: >60 ML/MIN/1.73M*2
GLUCOSE SERPL-MCNC: 104 MG/DL (ref 70–99)
HCT VFR BLDCO AUTO: 42.5 % (ref 35–45)
HGB BLD-MCNC: 13.9 G/DL (ref 11.8–15.7)
IMM GRANULOCYTES # BLD AUTO: 0.03 K/UL (ref 0–0.08)
IMM GRANULOCYTES NFR BLD AUTO: 0.4 %
INR PPP: 1.1
LYMPHOCYTES # BLD: 1.12 K/UL (ref 1.2–3.5)
LYMPHOCYTES NFR BLD: 14.7 %
MAGNESIUM SERPL-MCNC: 1.9 MG/DL (ref 1.9–2.7)
MCH RBC QN AUTO: 30.8 PG (ref 28–33.2)
MCHC RBC AUTO-ENTMCNC: 32.7 G/DL (ref 32.2–35.5)
MCV RBC AUTO: 94 FL (ref 83–98)
MONOCYTES # BLD: 0.93 K/UL (ref 0.28–0.8)
MONOCYTES NFR BLD: 12.2 %
NEUTROPHILS # BLD: 5.46 K/UL (ref 1.7–7)
NEUTS SEG NFR BLD: 71.4 %
NRBC BLD-RTO: 0 %
PDW BLD AUTO: 8.9 FL (ref 9.4–12.3)
PLATELET # BLD AUTO: 164 K/UL (ref 150–369)
POTASSIUM SERPL-SCNC: 3.5 MEQ/L (ref 3.5–5.1)
PROTHROMBIN TIME: 14.5 SEC (ref 12.2–14.5)
RBC # BLD AUTO: 4.52 M/UL (ref 3.93–5.22)
SODIUM SERPL-SCNC: 142 MEQ/L (ref 136–145)
WBC # BLD AUTO: 7.64 K/UL (ref 3.8–10.5)

## 2023-06-28 PROCEDURE — 97535 SELF CARE MNGMENT TRAINING: CPT | Mod: GO

## 2023-06-28 PROCEDURE — 80048 BASIC METABOLIC PNL TOTAL CA: CPT | Performed by: HOSPITALIST

## 2023-06-28 PROCEDURE — 36415 COLL VENOUS BLD VENIPUNCTURE: CPT | Performed by: HOSPITALIST

## 2023-06-28 PROCEDURE — 63700000 HC SELF-ADMINISTRABLE DRUG: Performed by: HOSPITALIST

## 2023-06-28 PROCEDURE — 99233 SBSQ HOSP IP/OBS HIGH 50: CPT | Performed by: INTERNAL MEDICINE

## 2023-06-28 PROCEDURE — 63600000 HC DRUGS/DETAIL CODE: Mod: JZ | Performed by: HOSPITALIST

## 2023-06-28 PROCEDURE — 83735 ASSAY OF MAGNESIUM: CPT | Performed by: HOSPITALIST

## 2023-06-28 PROCEDURE — 85025 COMPLETE CBC W/AUTO DIFF WBC: CPT | Performed by: HOSPITALIST

## 2023-06-28 PROCEDURE — 97116 GAIT TRAINING THERAPY: CPT | Mod: GP

## 2023-06-28 PROCEDURE — 85730 THROMBOPLASTIN TIME PARTIAL: CPT | Performed by: HOSPITALIST

## 2023-06-28 PROCEDURE — 85610 PROTHROMBIN TIME: CPT | Performed by: HOSPITALIST

## 2023-06-28 PROCEDURE — 99232 SBSQ HOSP IP/OBS MODERATE 35: CPT | Performed by: INTERNAL MEDICINE

## 2023-06-28 PROCEDURE — 92523 SPEECH SOUND LANG COMPREHEN: CPT | Mod: GN

## 2023-06-28 PROCEDURE — 20600000 HC ROOM AND CARE INTERMEDIATE/TELEMETRY

## 2023-06-28 PROCEDURE — 25800000 HC PHARMACY IV SOLUTIONS: Performed by: HOSPITALIST

## 2023-06-28 PROCEDURE — 99233 SBSQ HOSP IP/OBS HIGH 50: CPT | Performed by: HOSPITALIST

## 2023-06-28 RX ORDER — HEPARIN SODIUM 10000 [USP'U]/100ML
100-4000 INJECTION, SOLUTION INTRAVENOUS
Status: DISCONTINUED | OUTPATIENT
Start: 2023-06-28 | End: 2023-06-30

## 2023-06-28 RX ORDER — CYCLOSPORINE 0.5 MG/ML
1 EMULSION OPHTHALMIC EVERY 12 HOURS
Status: DISCONTINUED | OUTPATIENT
Start: 2023-06-28 | End: 2023-07-01 | Stop reason: HOSPADM

## 2023-06-28 RX ORDER — ROSUVASTATIN CALCIUM 20 MG/1
20 TABLET, COATED ORAL NIGHTLY
Status: DISCONTINUED | OUTPATIENT
Start: 2023-06-28 | End: 2023-07-01 | Stop reason: HOSPADM

## 2023-06-28 RX ORDER — PANTOPRAZOLE SODIUM 40 MG/1
40 TABLET, DELAYED RELEASE ORAL DAILY
Status: DISCONTINUED | OUTPATIENT
Start: 2023-06-28 | End: 2023-07-01 | Stop reason: HOSPADM

## 2023-06-28 RX ADMIN — PANTOPRAZOLE SODIUM 40 MG: 40 TABLET, DELAYED RELEASE ORAL at 14:31

## 2023-06-28 RX ADMIN — SODIUM CHLORIDE: 9 INJECTION, SOLUTION INTRAVENOUS at 08:20

## 2023-06-28 RX ADMIN — CYCLOSPORINE 1 DROP: 0.5 EMULSION OPHTHALMIC at 21:32

## 2023-06-28 RX ADMIN — ROSUVASTATIN CALCIUM 20 MG: 20 TABLET, FILM COATED ORAL at 21:32

## 2023-06-28 RX ADMIN — ASPIRIN 81 MG: 81 TABLET, COATED ORAL at 14:33

## 2023-06-28 RX ADMIN — HYDRALAZINE HYDROCHLORIDE 10 MG: 20 INJECTION INTRAMUSCULAR; INTRAVENOUS at 22:49

## 2023-06-28 RX ADMIN — HEPARIN SODIUM AND DEXTROSE 750 UNITS/HR: 10000; 5 INJECTION INTRAVENOUS at 18:26

## 2023-06-28 ASSESSMENT — COGNITIVE AND FUNCTIONAL STATUS - GENERAL
WALKING IN HOSPITAL ROOM: 2 - A LOT
HELP NEEDED FOR PERSONAL GROOMING: 2 - A LOT
AFFECT: ANXIOUS
REMEMBERING 5 ERRANDS WITH NO LIST: 1 - UNABLE
TAKING CARE OF COMPLICATED TASKS: 1 - UNABLE
DRESSING REGULAR UPPER BODY CLOTHING: 2 - A LOT
MOVING TO AND FROM BED TO CHAIR: 3 - A LITTLE
EATING MEALS: 2 - A LOT
DRESSING REGULAR LOWER BODY CLOTHING: 2 - A LOT
FOLLOWS FAMILIAR CONVERSATION: 2 - A LOT
TOILETING: 3 - A LITTLE
REMEMBERING TO TAKE MEDICATION: 1 - UNABLE
AFFECT: ANXIOUS
REMEMBERING WHERE THINGS ARE: 2 - A LOT
UNDERSTANDING 10 TO 15 MIN SPEECH: 1 - UNABLE
HELP NEEDED FOR BATHING: 2 - A LOT
STANDING UP FROM CHAIR USING ARMS: 2 - A LOT
CLIMB 3 TO 5 STEPS WITH RAILING: 2 - A LOT
AFFECT: ANXIOUS

## 2023-06-28 NOTE — ASSESSMENT & PLAN NOTE
Per TTE report on 6/19  Waiting for Dr. Aparicio's recommendation: AC    6/21  Heparin drip started on 6/20

## 2023-06-28 NOTE — ASSESSMENT & PLAN NOTE
Monitor on telemetry  N.p.o. except medication  Trend troponin  Repeat ECG  Received 325 mg ASA, will continue 81 mg daily  Dr. Lorenzo's consult appreciated.  TTE: EF 43%, Left ventricular apical thrombus  On daily ASA 81 mg  Hep Drip to NOAC

## 2023-06-28 NOTE — PLAN OF CARE
Care Coordination Admission Assessment Note    General Information:  Readmission Within the last 30 days: no previous admission in last 30 days  Does patient have a :    Patient-Specific Goals (include timeframe): patient is unable to state - aphasic    Living Arrangements:  Arrived From: home  Current Living Arrangements: home  People in Home: alone  Home Accessibility: stairs to enter home (Group)  Living Arrangement Comments: patient lives alone in a 2sh home with one step    Housing Stability and Financial Resources (SDOH):  In the last 12 months, was there a time when you were not able to pay the mortgage or rent on time?: No  In the last 12 months, how many places have you lived?:    In the last 12 months, was there a time when you did not have a steady place to sleep or slept in a shelter (including now)?: No  How hard is it for you to pay for the very basics like food, housing, medical care, and heating?: Not hard at all    Functional Status Prior to Admission:   Assistive Device/Animal Currently Used at Home: walker, front-wheeled  Functional Status Comments: needs assistance  IADL Comments: needs assistance     Supports and Services:  Current Outpatient/Agency/Support Group: none  Type of Current Home Care Services:    History of home care episode or rehab stay:      Discharge Needs Assessment:   Concerns to be Addressed: discharge planning  Current Discharge Risk:    Anticipated Changes Related to Illness: inability to care for someone else, inability to care for self    Patient/Family Anticipated Discharge Plan:  Patient/Family Anticipates Transition To:    Patient/Family Anticipated Services at Transition:      Connection to Community       Patient Choice:   Offered/Gave Vendor List: no  Patient's Choice of Community Agency(s):         Anticipated Discharge Plan:  Met with patient. Provided education and contact information for Care Coordination services.: yes  Anticipated Discharge  Disposition: acute rehab/Inpatient Rehab Facility     Transportation Needs (SDOH):  Transportation Concerns: none  Transportation Anticipated: car, drives self, family or friend will provide  Is Out of Hospital DNR needed at discharge?: no    In the past 12 months, has lack of transportation kept you from medical appointments or from getting medications?: No  In the past 12 months, has lack of transportation kept you from meetings, work, or from getting things needed for daily living?: No    Concerns - comments: PM&R following and diet has been ordered.

## 2023-06-28 NOTE — ASSESSMENT & PLAN NOTE
Monitor on telemetry  N.p.o. except medication  Trend troponin  Repeat ECG  Received 325 mg ASA, will continue 81 mg daily  Dr. Lorenzo's consult appreciated.  TTE: EF 43%, Left ventricular apical thrombus  On daily ASA 81 mg

## 2023-06-28 NOTE — ASSESSMENT & PLAN NOTE
Brain CT x3: Left MCA territory Ischemia. No Hemorrhage    Monitor on PCU  Aphasic and right-sided weakness  Neurochecks/NIH  No MRI brain, MRA head/neck ordered Due to PPM  Obtain ECHO  Trend troponin  Stabilizing BP  Check Lipid panel, A1c  Received 325 mg ASA, will continue 81 mg daily  Continue home regimen   IV Hydralazine SBP > 180  Neurology consult Dr. Aparicio saw her , appreciated.    6/28  Neuro Status stable.  SLP recommended Puree Diet with mild Thick  On Daily ASA 81 mg  TTE reported Left ventricular apical thrombus   D/w Dr. Aparicio regarding the AC Tx     PT/OT evaluation  PM&R for [facility]

## 2023-06-28 NOTE — ASSESSMENT & PLAN NOTE
hold home meds  Fall precautions  Aspiration precautions  Consult pt/ot  Consult case mgt  bp parameters for hypotension  dvt prophylaxis with  scd  Pt is full code  Spent 55 minutes assessing planning for patient care    6/27~  Stable today

## 2023-06-28 NOTE — PLAN OF CARE
Problem: Mobility Impairment  Goal: Optimal Mobility  Outcome: Progressing     Problem: Adult Inpatient Plan of Care  Goal: Plan of Care Review  Outcome: Progressing    Pt OOB twice this am with alarm sounding.  Pt exhibits expressive and receptive aphasia although follows some commands.  Virtual Meir ordered - report given to Areli  with goal of safety and to maintain IV access.  Will direct stroke teaching to both pt and family when they arrive today

## 2023-06-28 NOTE — PROGRESS NOTES
Patient:  Jennifer Sams  Location:  [facility] 3A 3031  MRN:  [id number]  Today's date:  6/28/2023    SLP Diagnosis  Severe expressive>receptive language deficits; apraxia; dysarthia; oral/pharyngeal dysphagia    Swallowing Recommendations  Diet Consistency pureed (PU4), mildly thick liquids (MT2)     Medication Administration crushed with pureed   Supervision Level 1:1 supervision needed   Feeding Recommendations allow patient to feed self if maintaining safety, allow extra time for meals, single cup sips only, small bites/sips, stop meal if showing signs of aspiration or fatigue (e.g., coughing, wet voice)   Posture Recommendations fully upright in chair/chair mode of bed   Swallowing Strategies alternate food and liquid intake   Instrumental Assessment Recommendations reassess via non-instrumental clinical swallow evaluation.     Comments Frequent oral care needed.     Summary/Handoff  Pt seen for follow up. Alert and cooperative for session. Appeared frustrated due to communication deficits. With encouragement, occ tried to express needs verbally. Non-fluent, anomic. Unable to imitate. Singing with no real words but good melodic line. Suspect verbal and ideo-motor apraxia impacting communication. Poor ability to follow one step commands even with verbal and tactile cues; supsect apraxia impacts performance. Responded to simple yes/no questions with 75% accy. Mild-mod dysarthria. Demo mod oral stage deficits chino by reduced lip seal, R sided leakage with no awareness indicating reduced sensation, reduced oral control with supsected posterior bolus loss with liquids, reduced mastication and reduced ability to lateralize and transfer solids. Suspect mild-mod pharyngeal deficits chino by delayed and dyscoordianted swallow impacted by impulsive intake and oral stage deficits leading to cough after swallow with SB6 solids and thin liquids. Rec puree diet and mildly thick liquids at this time. Hopeful for  diet advance over the next few sessions if pt able to incorportate strategies. May benefit from further objective swallowing assessment if deficits persist.    Active Diet Orders (From admission, onward)     Start     Ordered    06/28/23 1300  Adult Diet Pureed PU4; Mildly Thick MT2; Cardiac (Low Sodium/Low Fat); RD/LDN may adjust order  Diet effective now        Question Answer Comment   Diet Texture Pureed PU4    Fluid Consistency: Mildly Thick MT2    Other Restriction(s): Cardiac (Low Sodium/Low Fat)    Delegation of Authority. Diet orders written by PA/CRLyssa may not be adjusted by RD/LDNs. RD/LDN may adjust order        06/28/23 1259                Jennifer is a 78 y.o. female admitted on 6/26/2023 with NSTEMI (non-ST elevated myocardial infarction) (CMS/formerly [facility]) [I21.4]  Coronary artery disease involving native coronary artery of native heart with angina pectoris (CMS/HCC) [I25.119]  Cerebrovascular accident (CVA), unspecified mechanism (CMS/HCC) [I63.9]. Principal problem is Cerebrovascular accident (CVA), unspecified mechanism (CMS/HCC).    Past Medical History  Jennifer has a past medical history of AAA (abdominal aortic aneurysm), Arthritis, Elevated blood pressure reading without diagnosis of hypertension (4/19/2019), Epistaxis (1/6/2020), GERD (gastroesophageal reflux disease) (4/19/2019), Glaucoma (4/19/2019), Heart murmur, Hiatal hernia, History of hip replacement, total, right (4/19/2019), History of rheumatic fever as a child (4/19/2019), Hypercholesterolemia (4/19/2019), Ischemic cardiomyopathy (5/9/2019), Kidney cysts, Osteoarthritis of multiple joints (4/19/2019), Osteoporosis, Pacemaker (12/9/2019), Raynaud's disease (4/19/2019), RSD (reflex sympathetic dystrophy) (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    History of Present Illness   78 y.o. female with a past medical history of AAA GERD CAD with a pacemaker.  Lives at alone asked patient does still his own ADLs.  Patient has no family members except for friends who are basically are power of .  According to friend patient has not been in contact for the past 2 to 3 days and decided to visit patient.  Patient confused combative standing but on a leaning to the right side.  Patient needed to have EMS arrived as patient became combative needed to be bearhug throughout the trip.  Patient here in the ED was aphasic with right-sided weakness patient's blood pressure was 190/84 temperature was 98.3 BUN was 14 glucose 117 hemoglobin was 15.8    CT brain perfusion: 1. Atherosclerosis at the carotid bifurcations with mild luminal narrowing measuring approximately 25% on the right and 20% on the left using NASCET criteria. Patent cervical carotid and vertebral arteries. 2. Termination of flow in an M3 branch of the left middle cerebral artery. 3. Intracranial atherosclerosis with significant narrowing involving the left V4 segment after the takeoff of the posterior inferior cerebellar artery. Mild irregularity of the basilar artery. 4. Completed infarct in the posterior aspect of the left MCA territory. No evidence of ischemic penumbra. rCBF <30% : 0 cc TMax > 6s: 2 cc Mismatch volume: 2 cc Mismatch Ratio: NA 5. Additional chronic and incidental findings, as above.    CT head (6/26): Evolving left MCA territory infarct.  Small hyperdense foci within the infarct may reflect contrast staining from interval angiography versus punctate hemorrhage.  Short interval follow-up recommended.    CT head (6/27): Acute/subacute left MCA distribution infarct with 3 mm midline shift.. No definite hemorrhage      SLP Vitals    Date/Time Pulse O2 Therapy Who   06/28/23 1200 75 None (Room air) MAD      SLP Pain    Date/Time Pain Type CNPI: Nonverbal Movement CNPI: Nonverbal Rest CNPI: Facial Movement CNPI: Facial Rest CNPI: Bracing Movement CNPI: Bracing Rest CNPI: Restlessness Movement  CNPI: Restlessness Rest CNPI: Rubbing Movement CNPI: Rubbing Rest CNPI: Vocal Movement CNPI: Vocal Rest CNPI: Score Who   06/28/23 1138 Pain Assessment 0 0 0 0 0 0 0 0 0 0 0 0 0 ASC   06/28/23 1200 -- 0 0 0 0 0 0 0 0 0 0 0 0 0 MAD          Prior Living Environment    Flowsheet Row Most Recent Value   People in Home alone   Current Living Arrangements home   Home Accessibility stairs to enter home (Group)   Living Environment Comment Per patient's friend, Samara, patient lives alone in a 2SH        Prior Level of Function    Flowsheet Row Most Recent Value   Ambulation independent   Transferring independent   Toileting independent   Bathing independent   Dressing independent   Eating independent   IADLs independent   Communication understands/communicates without difficulty   Swallowing swallows foods/liquids without difficulty   Baseline Diet/Method of Nutritional Intake thin liquids, regular   Assistive Device Currently Used at Home No known   Prior Level of Function Comment PLOF provided by patient's friend, Samara, due to patient with severe aphasia   Assistive Device/Animal Currently Used at Home walker, front-wheeled           SLP Evaluation and Treatment - 06/28/23 1138        SLP Time Calculation    Start Time 1138     Stop Time 1203     Time Calculation (min) 25 min        General Information    Document Type Daily Treatment/Progress Note     Mode of Treatment speech language pathology     Position at Start of Session supine;in bed     Status at Start of Session agreeable to therapy     General Observations of Patient Virtual Meir in room        Precautions/Limitations/Impairments    Existing Precautions/Restrictions fall;NPO;aspiration;other (see comments)   aphasia    Limitations/Impairments safety/cognitive;swallowing        Cognition/Psychosocial    Affect/Mental Status anxious     Orientation Status unable/difficult to assess     Follows Commands follows one-step commands;25-49% accuracy        Lip  Function (Oral Motor)    Lip Range of Motion (Oral Motor) protrusion impairment;retraction impairment     Protrusion, Lip Range of Motion right side     Retraction, Lip Range of Motion right side        Tongue Function (Oral Motor)    Tongue ROM (Oral Motor) lateralization is impaired     Lateralization, Tongue ROM Impairment (Oral Motor) right side        Jaw Function (Oral Motor)    Jaw Function (Oral Motor) WNL        Cough/Swallow/Gag Reflex (Oral Motor)    Soft Palate/Velum (Oral Motor) unable/difficult to assess     Volitional Throat Clear/Cough (Oral Motor) reduced strength     Volitional Swallow (Oral Motor) mildly delayed        Vocal Quality/Secretion Management (Oral Motor)    Vocal Quality (Oral Motor) WFL     Secretion Management (Oral Motor) WNL        GRBAS    Grade 0-->none     Roughness 0-->none     Breathiness 0-->none     Asthenia 0-->none     Strain 0-->none        Motor Speech    Speech Intelligibility (Motor Speech) word level;phrase/sentence level     Word Level, Speech Intelligibility (Motor Speech) impaired;moderate impairment     Phrase/Sentence Level, Speech Intelligibility (Motor Speech) impaired;moderate impairment     Articulation (Motor Speech) imprecise articulation        Auditory Comprehension    Follows Commands (Auditory Comprehension) 1-step command     1 Step, Follows Commands (Auditory Comprehension) 25-49% accuracy     Yes/No Questions (Auditory Comprehension) biographical/personal questions     Biographical/Personal Questions (Auditory Comprehension) 75-90% accuracy        Verbal Expression    Comment, Assessment (Verbal Expression) Non-fluent, anomic; can't imitate; no real words with singing but good melodic line; frustrated     Comment, Intervention (Verbal Expression) Ideo-motor apraxia        Functional Communication Measures    FCM: Spoken Language, Comprehension 4-->Level 4     FCM: Spoken Language, Expression 2-->Level 2     FCM: Swallowing 4-->Level 4        General  Swallowing Observations    Current Diet/Method of Nutritional Intake NPO     Signs/Symptoms of Aspiration (Current Diet) none     Respiratory Support (General Swallowing Observations) none        Food and Liquid Trials (NIS)    Patient Positioning HOB elevated (specify degrees)     Oral Intake/Feeding Performance oral trials administered by therapist;set-up of food/liquid needed     Liquid Consistencies Evaluated thin liquids;mildly thick liquids (MT2)     Thin Liquids patient-controlled amounts;single cup sips;multiple consecutive cup sips     Mildly Thick Liquids (MT2) patient-controlled amounts;multiple consecutive cup sips;single cup sips;sips from cup     Food Consistencies Evaluated pureed (PU4);soft and bite-sized (SB6)     Pureed (PU4) use of teaspoon/fork     Soft and Bite-Sized (SB6) patient controlled amounts;cued single bites/sips     Oral Preparatory Phase of Swallow moderate impairment;mouth closure around utensil/cup decreased;lip seal impaired;bolus cohesion decreased;loss of bolus/oral leakage, right side;mastication, ineffective;oral retention, right lateral sulci;suspect posterior bolus leak     Oral Phase of Swallow moderate impairment;absent anterior-posterior transit;delayed anterior-posterior transit;oral residue, incomplete swallow;suspect posterior bolus leak     Pharyngeal Phase of Swallow delayed swallow reflex initiation;coughing after swallow;uncoordinated swallow     Esophageal Phase of Swallow no clinical symptoms        Swallowing Recommendations    Diet Consistency Recommendations pureed (PU4);mildly thick liquids (MT2)     Medication Administration crushed with pureed     Supervision Level for Intake 1:1 supervision needed     Feeding/Delivery Recommendations allow patient to feed self if maintaining safety;allow extra time for meals;single cup sips only;small bites/sips;stop meal if showing signs of aspiration or fatigue (e.g., coughing, wet voice)     Posture Recommendations fully  upright in chair/chair mode of bed     Swallowing Strategies alternate food and liquid intake     Instrumental Assessment Recommendations reassess via non-instrumental clinical swallow evaluation        Swallowing Intervention    Dysphagia/Swallowing Interventions monitor tolerance of;current diet without evidence of aspiration;advanced diet/liquid texture trials;caregiver training;compensatory swallowing strategies;oral sensory stimulation program;pharyngeal therapeutic exercise program        Coping    Observed Emotional State anxious;cooperative     Trust Relationship/Rapport care explained;reassurance provided        AM-PAC™ - Cognition (Current Function)    Following/understanding a 10-15 minute speech or presentation? 1 - Unable     Understanding familiar people during ordinary conversations? 2 - A lot     Remembering to take medications at the appropriate time? 1 - Unable     Remembering where things were placed or put away? 2 - A lot     Remembering a list of 3 or 4 errands without writing it down? 1 - Unable     Taking care of complicated tasks? 1 - Unable     AM-PAC™ Cognition Score 8        SLP Goals    Swallowing Goal Selection oral nutrition/hydration, SLP Goal 1        Auditory Comprehension Goal 1    Auditory Comprehension Goal 1 Pt will follow 1 step commands with >60% accy with min cues     Time Frame short-term goal (STG);by discharge        Session Outcome    Position at End of Session in bed;reclined     Status at End of Session bed alarm on;all needs met;call light in reach;personal items in reach     Nursing Notified patient's performance        Plan    Rehab Potential good, to achieve stated therapy goals     Therapy Frequency 5 times/wk     Planned Therapy Interventions dysphagia therapy;instrumental swallow assessment;language therapy;patient/family education;speech therapy               SLP Discharge Recommendations    Flowsheet Row Most Recent Value   SLP Recommended Discharge Disposition  acute rehab/Inpatient Rehab Facility at 06/28/2023 1138                    SLP Goals    Flowsheet Row Most Recent Value   Communication Goal 1    Communication Goal 1 Pt will imitate sounds and words with max multi-modal cues >50% of the time at 06/28/2023 1138   Time Frame short-term goal (STG), by discharge at 06/28/2023 1138   Auditory Comprehension Goal 1    Auditory Comprehension Goal 1 Pt will follow 1 step commands with >60% accy with min cues at 06/28/2023 1138   Time Frame short-term goal (STG), by discharge at 06/28/2023 1138   Oral Nutrition/Hydration Goal 1    Activity effective/safe, management of texture/viscosity, with caregiver assist at 06/28/2023 1138   Time Frame short-term goal (STG), by discharge at 06/28/2023 1138   Pharyngeal Swallow Goal 1    Activity safely tolerate, thin liquids, pureed (PU4) at 06/27/2023 0940   [place] independently at 06/27/2023 0940   Time Frame by discharge at 06/27/2023 0940   Progress/Outcome goal ongoing at 06/28/2023 1138

## 2023-06-28 NOTE — PROGRESS NOTES
Occupational Therapy -  Daily Treatment/Progress Note     Patient: Jennifer Sams  Location: [address] [address]  MRN: [id number]  Today's date: 6/28/2023    HISTORY OF PRESENT ILLNESS     Jennifer is a 78 y.o. female admitted on 6/26/2023 with NSTEMI (non-ST elevated myocardial infarction) (CMS/[facility]) [I21.4]  Coronary artery disease involving native coronary artery of native heart with angina pectoris (CMS/HCC) [I25.119]  Cerebrovascular accident (CVA), unspecified mechanism (CMS/HCC) [I63.9]. Principal problem is Cerebrovascular accident (CVA), unspecified mechanism (CMS/HCC).    Past Medical History  Jennifer has a past medical history of AAA (abdominal aortic aneurysm), Arthritis, Elevated blood pressure reading without diagnosis of hypertension (4/19/2019), Epistaxis (1/6/2020), GERD (gastroesophageal reflux disease) (4/19/2019), Glaucoma (4/19/2019), Heart murmur, Hiatal hernia, History of hip replacement, total, right (4/19/2019), History of rheumatic fever as a child (4/19/2019), Hypercholesterolemia (4/19/2019), Ischemic cardiomyopathy (5/9/2019), Kidney cysts, Osteoarthritis of multiple joints (4/19/2019), Osteoporosis, Pacemaker (12/9/2019), Raynaud's disease (4/19/2019), RSD (reflex sympathetic dystrophy) (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    History of Present Illness   78 y.o. female with a past medical history of AAA GERD CAD with a pacemaker. Lives at alone asked patient does still his own ADLs.  Patient has no family members except for friends who are basically are power of .  According to friend patient has not been in contact for the past 2 to 3 days and decided to visit patient.  Patient confused combative standing but on a leaning to the right side.  Patient needed to have EMS arrived as patient became combative needed to be bearhug throughout the trip.  Patient here in the ED was  aphasic with right-sided weakness patient's blood pressure was 190/84 temperature was 98.3 BUN was 14 glucose 117 hemoglobin was 15.8    CT brain perfusion: 1. Atherosclerosis at the carotid bifurcations with mild luminal narrowing measuring approximately 25% on the right and 20% on the left using NASCET criteria. Patent cervical carotid and vertebral arteries. 2. Termination of flow in an M3 branch of the left middle cerebral artery. 3. Intracranial atherosclerosis with significant narrowing involving the left V4 segment after the takeoff of the posterior inferior cerebellar artery. Mild irregularity of the basilar artery. 4. Completed infarct in the posterior aspect of the left MCA territory. No evidence of ischemic penumbra. rCBF <30% : 0 cc TMax > 6s: 2 cc Mismatch volume: 2 cc Mismatch Ratio: NA 5. Additional chronic and incidental findings, as above.    CT head (6/26): Evolving left MCA territory infarct.  Small hyperdense foci within the infarct may reflect contrast staining from interval angiography versus punctate hemorrhage.  Short interval follow-up recommended.    CT head (6/27): Acute/subacute left MCA distribution infarct with 3 mm midline shift.. No definite hemorrhage    PRIOR LEVEL OF FUNCTION AND LIVING ENVIRONMENT     Prior Level of Function    Flowsheet Row Most Recent Value   Ambulation independent   Transferring independent   Toileting independent   Bathing independent   Dressing independent   Eating independent   IADLs independent   Communication understands/communicates without difficulty   Swallowing swallows foods/liquids without difficulty   Baseline Diet/Method of Nutritional Intake thin liquids, regular   Past History of Dysphagia No known   Prior Level of Function Comment PLOF provided by patient's friend, Samara, due to patient with severe aphasia   Assistive Device Currently Used at Home walker, front-wheeled        Prior Living Environment    Flowsheet Row Most Recent Value   People  in Home alone   Current Living Arrangements home   Home Accessibility stairs to enter home (Group)   Living Environment Comment Per patient's friend, Samara, patient lives alone in a 2SH        Occupational Profile    Flowsheet Row Most Recent Value   Successful Occupations Indep baseline   Performance Patterns Has a cat, Loan   Environmental Supports and Barriers Friends nearby, lives alone        VITALS AND PAIN     OT Vitals    Date/Time Pulse HR Source SpO2 Pt Activity O2 Therapy BP BP Location BP Method Pt Position [facility]   06/28/23 1520 86 Monitor 97 % At rest None (Room air) 140/81 Left upper arm Automatic Lying CMU      OT Pain    Date/Time Pain Type CNPI: Nonverbal Movement CNPI: Nonverbal Rest CNPI: Facial Movement CNPI: Facial Rest CNPI: Bracing Movement CNPI: Bracing Rest CNPI: Restlessness Movement CNPI: Restlessness Rest CNPI: Rubbing Movement CNPI: Rubbing Rest CNPI: Vocal Movement CNPI: Vocal Rest CNPI: Score [facility]   06/28/23 1520 Pain Assessment 0 0 0 0 0 0 0 0 0 0 0 0 0 CMU        Objective   OBJECTIVE     Start time:  1513  End time:  1528  Session Length: 15 min  Mode of Treatment: occupational therapy, co-treatment (OT assessed ADLs, PT assessed mobility)    General Observations  Patient received supine, in bed. She was agreeable to therapy, no issues or concerns identified by nurse prior to session. virtual shivani in place, visitors present throughout session, impulsively attempting to stand to use bathroom    Precautions: fall, NPO, aspiration, other (see comments)       Limitations/Impairments: safety/cognitive, swallowing (aphasia)   Services  Do You Speak a Language Other Than English at Home?: no      OT Eval and Treat - 06/28/23 1513        Cognition    Orientation Status unable/difficult to assess   ID confirmed on hospital bracelet    Affect/Mental Status anxious     Follows Commands follows one-step commands;0-24% accuracy     Cognitive Function attention deficit;executive  function deficit;safety deficit     Attention Deficit moderate deficit;concentration;focused/sustained attention     Executive Function Deficit severe deficit;impulse control;insight/awareness of deficits;judgment;problem-solving/reasoning;planning/decision-making;organization/sequencing;self-monitoring/self-correction     Safety Deficit severe deficit;ability to follow commands;awareness of need for assistance;impulsivity;insight into deficits/self-awareness;judgment;problem-solving;safety precautions awareness;safety precautions follow-through/compliance     Comment, Cognition Pt with limited command following, demonstrates frustation with therapist assisting with mobility/tasks        Bed Mobility    Bed Mobility Activities left;supine to sit;sit to supine     [place] close supervision     Safety/Cues verbal cues;impulsivity     Assistive Device head of bed elevated     Comment impulsively standing from bed to use bathroom upon arrival        Mobility Belt    Mobility Belt Used for All Out of Bed Activity no     Reason Mobility Belt Not Used other (see comments)     Reason Mobility Belt Not Used impulsive throughout session        Sit/Stand Transfer    Surface edge of bed     [place] moderate assist (50-74% patient effort)     Safety/Cues moderate;verbal cues;impulsivity     Assistive Device none     Transfer Comments HHA        Toilet Transfer    Transfer Technique sit/stand     [place] minimum assist (75% or more patient effort)     Safety/Cues moderate;verbal cues;hand placement;impulsivity;technique     Assistive Device grab bars/safety frame        Functional Mobility    Distance in room/bathroom;hallway     Functional Mobility [place] moderate assist (50-74% patient effort)     Safety/Cues verbal cues;technique;impulsivity     Assistive Device none     Functional Mobility Comments HHA, assistance varied from Min-Mod A with ambulation, increased assistance required for turning, see PT  note for further assessment        Lower Body Dressing    Tasks don;socks     [place] moderate assist (50-74% patient effort)     Safety/Cues verbal cues;impulsivity;problem-solving     Position unsupported standing     Adaptive Equipment none     Comment impulsively attempting to lift leg to adjust socks, requiring Mod A for stability/safety/balance        Grooming    Tasks brushes/[place] hair;washes, rinses and dries hands     [place] minimum assist (75% or more patient effort)     Safety/Cues verbal cues;impulsivity     Position unsupported standing;sitting up in bed     Setup Assistance obtain supplies     Adaptive Equipment none     Comment able to properly use brush this session compared to previous with [place] Min A for balance and safety at sink for handing hygiene        Toileting    [place] minimum assist (75% or more patient effort)     Safety/Cues impulsivity;termination     Position unsupported sitting     Adaptive Equipment none     Comment cues to terminate tasks        Balance    Static Sitting Balance WFL     Dynamic Sitting Balance mild impairment     Sit to Stand Dynamic Balance mild impairment     Static Standing Balance mild impairment     Dynamic Standing Balance moderate impairment     Comment, Balance HHA        Impairments/Safety Issues    Impairments Affecting Function balance;cognition;coordination;endurance/activity tolerance;pain;strength     Cognitive Impairments, Safety/Performance impulsivity;insight into deficits/self-awareness;judgment;awareness, need for assistance;problem-solving/reasoning;attention;safety precaution awareness;safety precaution follow-through;sequencing abilities                                 Session Outcome  Patient upright, in bed (visitors present in room) at end of session, bed alarm on, all needs met, call light in reach, personal items in reach. Nursing notified about patient's performance and patient's position.    AM-PAC™ - ADL (Current  Function)     Putting on/taking off regular lower body clothing 2 - A Lot   Bathing 2 - A Lot   Toileting 3 - A Little   Putting on/taking off regular upper body clothing 2 - A Lot   Help for taking care of personal grooming 2 - A Lot   Eating meals 2 - A Lot   AM-PAC™ ADL Score 13      ASSESSMENT AND PLAN     Progress Summary    OT treat complete, ADL AM-PAC score of 13. Patient demonstrates close supervision supine<>sit, Mod A sit<>stand, Mod A ambulation, Mod A LB dressing, Min A grooming tasks, Min A toilet tasks. Session limited by decreased cognition, impulsivity, decreased insight into deficits/need for assistance, balance, aphasia. Patient demonstrates progress towards goals. Patient will continue to benefit from skilled OT services to maximize independence with self care and functional mobility. Recommend acute/inpatient rehab once medically stable.      Patient/Family Therapy Goal Statement: unable to express    OT Plan    Flowsheet Row Most Recent Value   Rehab Potential good, to achieve stated therapy goals at 06/27/2023 1403   Therapy Frequency 5 times/wk at 06/27/2023 1403   Planned Therapy Interventions BADL retraining, functional balance retraining, occupation/activity based interventions, patient/caregiver education/training, neuromuscular control/coordination retraining, transfer/mobility retraining, ROM/therapeutic exercise, strengthening exercise at 06/27/2023 1403          OT Discharge Recommendations    Flowsheet Row Most Recent Value   OT Recommended Discharge Disposition acute rehab/Inpatient Rehab Facility at 06/27/2023 1403   Anticipated Equipment Needs At Discharge (OT) other (see comments)  [TBD next LOC] at 06/27/2023 1403               OT Goals    Flowsheet Row Most Recent Value   Bed Mobility Goal 1    Activity/Assistive Device bed mobility activities, all at 06/27/2023 1403   [place] modified independence at 06/27/2023 1403   Time Frame by discharge at 06/27/2023 1403    Progress/Outcome goal ongoing at 06/27/2023 1403   Transfer Goal 1    Activity/Assistive Device sit-to-stand/stand-to-sit, bed-to-chair/chair-to-bed, toilet at 06/27/2023 1403   [place] minimum assist (75% or more patient effort) at 06/27/2023 1403   Time Frame by discharge at 06/27/2023 1403   Progress/Outcome goal ongoing at 06/27/2023 1403   Dressing Goal 1    Activity/Adaptive Equipment dressing skills, all at 06/27/2023 1403   [place] minimum assist (75% or more patient effort) at 06/27/2023 1403   Time Frame by discharge at 06/27/2023 1403   Progress/Outcome goal ongoing at 06/27/2023 1403   Toileting Goal 1    Activity/Assistive Device toileting skills, all at 06/27/2023 1403   [place] minimum assist (75% or more patient effort) at 06/27/2023 1403   Time Frame by discharge at 06/27/2023 1403   Progress/Outcome goal ongoing at 06/27/2023 1403   Grooming Goal 1    Activity/Assistive Device grooming skills, all at 06/27/2023 1403   [place] minimum assist (75% or more patient effort) at 06/27/2023 1403   Time Frame by discharge at 06/27/2023 1403   Progress/Outcome goal ongoing at 06/27/2023 1403

## 2023-06-28 NOTE — PROGRESS NOTES
Physical Therapy -  Daily Treatment/Progress Note     Patient: Jennifer Sams  Location: [address] [address]  MRN: [id number]  Today's date: 6/28/2023    HISTORY OF PRESENT ILLNESS     Jennifer is a 78 y.o. female admitted on 6/26/2023 with NSTEMI (non-ST elevated myocardial infarction) (CMS/[facility]) [I21.4]  Coronary artery disease involving native coronary artery of native heart with angina pectoris (CMS/HCC) [I25.119]  Cerebrovascular accident (CVA), unspecified mechanism (CMS/HCC) [I63.9]. Principal problem is Cerebrovascular accident (CVA), unspecified mechanism (CMS/HCC).    Past Medical History  Jennifer has a past medical history of AAA (abdominal aortic aneurysm), Arthritis, Elevated blood pressure reading without diagnosis of hypertension (4/19/2019), Epistaxis (1/6/2020), GERD (gastroesophageal reflux disease) (4/19/2019), Glaucoma (4/19/2019), Heart murmur, Hiatal hernia, History of hip replacement, total, right (4/19/2019), History of rheumatic fever as a child (4/19/2019), Hypercholesterolemia (4/19/2019), Ischemic cardiomyopathy (5/9/2019), Kidney cysts, Osteoarthritis of multiple joints (4/19/2019), Osteoporosis, Pacemaker (12/9/2019), Raynaud's disease (4/19/2019), RSD (reflex sympathetic dystrophy) (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    History of Present Illness   78 y.o. female with a past medical history of AAA GERD CAD with a pacemaker. Lives at alone asked patient does still his own ADLs.  Patient has no family members except for friends who are basically are power of .  According to friend patient has not been in contact for the past 2 to 3 days and decided to visit patient.  Patient confused combative standing but on a leaning to the right side.  Patient needed to have EMS arrived as patient became combative needed to be bearhug throughout the trip.  Patient here in the ED was aphasic  with right-sided weakness patient's blood pressure was 190/84 temperature was 98.3 BUN was 14 glucose 117 hemoglobin was 15.8    CT brain perfusion: 1. Atherosclerosis at the carotid bifurcations with mild luminal narrowing measuring approximately 25% on the right and 20% on the left using NASCET criteria. Patent cervical carotid and vertebral arteries. 2. Termination of flow in an M3 branch of the left middle cerebral artery. 3. Intracranial atherosclerosis with significant narrowing involving the left V4 segment after the takeoff of the posterior inferior cerebellar artery. Mild irregularity of the basilar artery. 4. Completed infarct in the posterior aspect of the left MCA territory. No evidence of ischemic penumbra. rCBF <30% : 0 cc TMax > 6s: 2 cc Mismatch volume: 2 cc Mismatch Ratio: NA 5. Additional chronic and incidental findings, as above.    CT head (6/26): Evolving left MCA territory infarct.  Small hyperdense foci within the infarct may reflect contrast staining from interval angiography versus punctate hemorrhage.  Short interval follow-up recommended.    CT head (6/27): Acute/subacute left MCA distribution infarct with 3 mm midline shift.. No definite hemorrhage    PRIOR LEVEL OF FUNCTION AND LIVING ENVIRONMENT     Prior Level of Function    Flowsheet Row Most Recent Value   Ambulation independent   Transferring independent   Toileting independent   Bathing independent   Dressing independent   Eating independent   IADLs independent   Communication understands/communicates without difficulty   Swallowing swallows foods/liquids without difficulty   Baseline Diet/Method of Nutritional Intake thin liquids, regular   Past History of Dysphagia No known   Prior Level of Function Comment PLOF provided by patient's friend, Samara, due to patient with severe aphasia   Assistive Device Currently Used at Home walker, front-wheeled        Prior Living Environment    Flowsheet Row Most Recent Value   People in Home  alone   Current Living Arrangements home   Home Accessibility stairs to enter home (Group)   Living Environment Comment Per patient's friend, Samara, patient lives alone in a 2SH        VITALS AND PAIN     PT Vitals    Date/Time Pulse HR Source SpO2 Pt Activity O2 Therapy BP BP Location BP Method Pt Position [facility]   06/28/23 1520 86 Monitor 97 % At rest None (Room air) 140/81 Left upper arm Automatic Lying CMU      PT Pain    Date/Time Pain Type CNPI: Nonverbal Movement CNPI: Nonverbal Rest CNPI: Facial Movement CNPI: Facial Rest CNPI: Bracing Movement CNPI: Bracing Rest CNPI: Restlessness Movement CNPI: Restlessness Rest CNPI: Rubbing Movement CNPI: Rubbing Rest CNPI: Vocal Movement CNPI: Vocal Rest CNPI: Score [facility]   06/28/23 1520 Pain Assessment 0 0 0 0 0 0 0 0 0 0 0 0 0 CMU        Objective   OBJECTIVE     Start time:  1514  End time:  1525  Session Length: 11 min  Mode of Treatment: co-treatment, physical therapy (pt impulsive, per rounds pt priority for auth for tomorrow)    General Observations  Patient received supine, in bed. She was agreeable to therapy, no issues or concerns identified by nurse prior to session. +virtual shivani, +friends/family total 3 present in room, received with bed alarm triggered 2/2 wanting to go to bathroom, NAD    Precautions: fall, NPO, aspiration (impulsive/unsafe)       Limitations/Impairments: safety/cognitive, swallowing   Services  Do You Speak a Language Other Than English at Home?: no      PT Eval and Treat - 06/28/23 1514        Cognition    Orientation Status unable/difficult to assess   ID confirmed on bracelet on ankle    Affect/Mental Status anxious     Follows Commands follows one-step commands;0-24% accuracy     Cognitive Function attention deficit;executive function deficit;safety deficit     Attention Deficit moderate deficit;concentration;focused/sustained attention        Bed Mobility    Bed Mobility Activities left;supine to sit;sit to supine      [place] supervision     Safety/Cues verbal cues;impulsivity     Assistive Device head of bed elevated        Mobility Belt    Mobility Belt Used for All Out of Bed Activity no     Reason Mobility Belt Not Used other (see comments)     Reason Mobility Belt Not Used Impulsive t/o session        Sit/Stand Transfer    Surface edge of bed     [place] moderate assist (50-74% patient effort)     Safety/Cues moderate;impulsivity     Assistive Device none     Transfer Comments Hand held assist        Gait Training    [place], Gait moderate assist (50-74% patient effort)     Safety/Cues increased time to complete;impulsivity;sequencing;technique     Assistive Device none     Distance in Feet 31 feet     Pattern step-through;step-to     Deviations/Abnormal Patterns base of support, narrow;argenis decreased;festinating/shuffling;ataxic     Comment (Gait/Stairs) impulsive t/o session, with variable assistance Min/Mod; increased assistance during change in direction and intermittently dragging her RLE neglect>decreased hip flexion        Stairs Training    Comment TBA as tolerated        Balance    Static Sitting Balance WFL     Dynamic Sitting Balance mild impairment     Sit to Stand Dynamic Balance mild impairment;supported     Static Standing Balance mild impairment;supported     Dynamic Standing Balance mild impairment;supported     Comment, Balance HHA        Impairments/Safety Issues    Impairments Affecting Function balance;cognition;coordination;endurance/activity tolerance;motor planning;strength     Cognitive Impairments, Safety/Performance impulsivity;insight into deficits/self-awareness;judgment;sequencing abilities;safety precaution awareness     Functional Endurance Fair-                         Session Outcome  Patient upright, in bed at end of session, bed alarm on, all needs met, call light in reach, personal items in reach (visitors present). Nursing notified about patient's position, patient's  performance, and patient's response to therapy/activity.    AM-PAC™ - Mobility (Current Function)     Turning form your back to your side while in flat bed without using bedrails 3 - A Little   Moving from lying on your back to sitting on the side of a flat bed without using bedrails 3 - A Little   Moving to and from a bed to a chair 3 - A Little   Standing up from a chair using your arms 2 - A Lot   To walk in a hospital room 2 - A Lot   Climbing 3-5 steps with a railing 2 - A Lot   AM-PAC™ Mobility Score 15      ASSESSMENT AND PLAN     Progress Summary  [facility] 15/24 Pt presents with requiring supervision for bed mobility, and ModA for transfers, and for ambulation. Pt continues to be limited in functional mobility 2/2 decreased generalized strength Rt>Lt, impaired standing dynamic balance, decreased activity tolerance indicating need for skilled PT in Acute inpatient Rehab.    Patient/Family Therapy Goals Statement: none stated    PT Plan    Flowsheet Row Most Recent Value   Rehab Potential good, to achieve stated therapy goals at 06/27/2023 1405   Therapy Frequency 5 times/wk at 06/27/2023 1405   Planned Therapy Interventions balance training, neuromuscular re-education, bed mobility training, ROM (range of motion), stair training, transfer training, patient/family education, gait training, home exercise program, postural re-education, strengthening at 06/27/2023 1405          PT Discharge Recommendations    Flowsheet Row Most Recent Value   PT Recommended Discharge Disposition acute rehab/Inpatient Rehab Facility at 06/28/2023 1514   Anticipated Equipment Needs at Discharge (PT) none  [TBD] at 06/28/2023 1514               PT Goals    Flowsheet Row Most Recent Value   Bed Mobility Goal 1    Activity/Assistive Device bed mobility activities, all at 06/27/2023 1405   [place] modified independence at 06/27/2023 1405   Time Frame by discharge at 06/27/2023 1405   Progress/Outcome goal ongoing at 06/27/2023 1405    Transfer Goal 1    Activity/Assistive Device all transfers at 06/27/2023 1405   [place] modified independence at 06/27/2023 1405   Time Frame by discharge at 06/27/2023 1405   Progress/Outcome goal ongoing at 06/27/2023 1405   Gait Training Goal 1    Activity/Assistive Device gait (walking locomotion) at 06/27/2023 1405   [place] modified independence at 06/27/2023 1405   Distance 125 at 06/27/2023 1405   Time Frame by discharge at 06/27/2023 1405   Progress/Outcome goal ongoing at 06/27/2023 1405   Stairs Goal 1    Activity/Assistive Device stairs, all skills at 06/27/2023 1405   [place] modified independence at 06/27/2023 1405   Number of Stairs 10 at 06/27/2023 1405   Time Frame by discharge at 06/27/2023 1405   Progress/Outcome goal ongoing at 06/27/2023 1405

## 2023-06-28 NOTE — ASSESSMENT & PLAN NOTE
Brain CT x3: Left MCA territory Ischemia. No Hemorrhage    Monitor on PCU  Aphasic and right-sided weakness  Neurochecks/NIH  No MRI brain, MRA head/neck ordered Due to PPM  Obtain ECHO  Trend troponin  Stabilizing BP  Check Lipid panel, A1c  Received 325 mg ASA, will continue 81 mg daily  Continue home regimen   IV Hydralazine SBP > 180  Neurology consult Dr. Aparicio saw her , appreciated.    6/28  Neuro Status stable.  SLP recommended Puree Diet with mild Thick  On Daily ASA 81 mg  TTE reported Left ventricular apical thrombus   D/w Dr. Aparicio regarding the AC Tx     PT/OT evaluation  PM&R for [facility]    6/21  The hep Drip started after d/w Dr. Aparicio.  So far tolerated well.  May changing to oral AC when OK with Neurology service  To [facility] after hospital care

## 2023-06-28 NOTE — PROGRESS NOTES
Cardiology Daily Progress Note       Subjective    Jennifer Sams is a 78 y.o. female.    Overview:  Jennifer Sams is a 78 y.o. female with PMHx of Ischemic Cardiomyopathy, Heart block s/p Pacemaker (Medtronic 11/18/2019), CAD, NSTEMI (4/2019) with FRANK X 2 to LAD, FRANK X 4 to RCA and FRANK X1 to left circumflex (on ASA only, plavix stopped due to epistaxis), AAA, GERD who was admitted for altered mental status.     Patient lives alone and was found by her friend to be confused, combative and standing leaning to the right. In ED, She was noted to be aphasic with right sided weakness and hypertensive. CT of head showed acute/subacyte Infract in the left MCA with 3mm midline shift, no definite hemorrhage on repeat CT scan. Initial CT scan with possible punctate hemorrhage.   Patient is awake and alert and was nodding intermittently to questions but not the best historian given her acute CVA.     Interval history:  Pt resting in bed. She has expressive and receptive aphasia. ECHO 6/27/23: LVEF 43%, localized apical aneurysm containing thrombus, possible mild AS, mild MR, normal RV, PASP 34.         Ace inhibitors, Atorvastatin, Brilinta [ticagrelor], Codeine, Dilaudid [hydromorphone], Morphine sulfate, Onion, and Oxycodone  Current Facility-Administered Medications   Medication Dose Route Frequency Provider Last Rate Last Admin   • acetaminophen (TYLENOL) tablet 650 mg  650 mg oral q6h PRN Kelby Martinez MD       • aspirin enteric coated tablet 81 mg  81 mg oral Daily Kelby Martinez MD       • cycloSPORINE (RESTASIS) 0.05 % ophthalmic emulsion 1 drop  1 drop Both Eyes q12h [facility] Richard Pandya MD       • glucose chewable tablet 16-32 g of dextrose  16-32 g of dextrose oral PRN Kelby Martinez MD        Or   • dextrose 40 % oral gel 15-30 g of dextrose  15-30 g of dextrose oral PRN Kelby Martinez MD        Or   • glucagon (GLUCAGEN) injection 1 mg  1 mg intramuscular PRN Kelby Martinez MD        Or   •  "dextrose 50 % in water (D50) injection 12.5 g  25 mL intravenous PRN Kelby Martinez MD       • enoxaparin (LOVENOX) syringe 40 mg  40 mg subcutaneous Daily (6p) Richard Pandya MD   40 mg at 06/27/23 1810   • hydrALAZINE (APRESOLINE) injection 10 mg  10 mg intravenous q4h PRN Kelby Martinez MD   10 mg at 06/27/23 2144   • LORazepam (ATIVAN) injection 1 mg  1 mg intravenous Once in imaging Kelby Martinez MD       • magnesium sulfate IVPB 2g in 50 mL NSS/D5W/SWFI  2 g intravenous PRN Kelby Martinez MD       • ondansetron (ZOFRAN) injection 4 mg  4 mg intravenous q8h PRN Kelby Martinez MD       • pantoprazole (PROTONIX) tablet,delayed release (DR/EC) 40 mg  40 mg oral Daily Richard Pandya MD       • potassium chloride (KLOR-CON M) ER tablet (particles/crystals) 20 mEq  20 mEq oral PRN Kelby Martinez MD       • potassium chloride (KLOR-CON M) ER tablet (particles/crystals) 40 mEq  40 mEq oral PRN Kelby Martinez MD       • potassium chloride 20 mEq in 100 mL IVPB  (premix)  20 mEq intravenous PRN Kelby Martinez MD       • potassium chloride 20 mEq in 100 mL IVPB  (premix)  20 mEq intravenous PRN Kelby Martinez MD        And   • potassium chloride 20 mEq in 100 mL IVPB  (premix)  20 mEq intravenous PRN Kelby Martinez MD       • rosuvastatin (CRESTOR) tablet 20 mg  20 mg oral Nightly Richard Pandya MD       • senna (SENOKOT) tablet 1 tablet  1 tablet oral 2x daily PRN Kelby Martinez MD       • sodium chloride 0.9 % infusion   intravenous Continuous Kelby Martinez MD 80 mL/hr at 06/28/23 0820 New Bag at 06/28/23 0820       Objective   Visit Vitals  BP (!) 113/58 (BP Location: Left upper arm, Patient Position: Lying)   Pulse 75   Temp 37.1 °C (98.8 °F) (Axillary)   Resp 18   Ht 1.702 m (5' 7\")   Wt 63.8 kg (140 lb 9.6 oz)   SpO2 94%   BMI 22.02 kg/m²         Intake/Output Summary (Last 24 hours) at 6/28/2023 1345  Last data filed at 6/28/2023 0820  Gross per 24 hour   Intake 1626.67 ml   Output " 150 ml   Net 1476.67 ml       Physical Exam  Constitutional:       General: She is not in acute distress.     Appearance: She is well-developed. She is not diaphoretic.   HENT:      Head: Normocephalic.   Pulmonary:      Effort: No respiratory distress.      Breath sounds: Normal breath sounds. No wheezing.   Chest:      Chest wall: No tenderness.   Abdominal:      General: There is no distension.      Palpations: Abdomen is soft.      Tenderness: There is no abdominal tenderness.   Musculoskeletal:         General: Normal range of motion.      Cervical back: Normal range of motion.   Skin:     General: Skin is warm and dry.   Neurological:      Mental Status: She is alert.      Comments: Expressive and receptive aphasia         Labs   Lab Results   Component Value Date    WBC 7.64 06/28/2023    HGB 13.9 06/28/2023    HCT 42.5 06/28/2023     06/28/2023    CHOL 178 06/26/2023    TRIG 93 06/26/2023    HDL 71 06/26/2023    LDLCALC 88 06/26/2023    ALT 16 06/26/2023    AST 28 06/26/2023     06/28/2023    K 3.5 06/28/2023     (H) 06/28/2023    CREATININE 0.7 06/28/2023    BUN 12 06/28/2023    CO2 19 (L) 06/28/2023    TSH 2.94 04/27/2022    INR 1.1 06/27/2023    GLUCOSE 104 (H) 06/28/2023    HGBA1C 5.4 06/26/2023    TROPONINI <0.02 01/17/2020       Imaging  I have personally reviewed all imaging.    Cardiac Imaging    TRANSTHORACIC ECHO (TTE) COMPLETE 06/27/2023    Interpretation Summary  Left ventricle with normal dimensions and regional contraction abnormalities consistent with CAD in the LAD distribution: Localized apical infarction with aneurysm formation.  Paradoxical septal movement is consistent with ventricular pacing.  There is moderate left ventricular systolic and mild diastolic dysfunction.  LVEF 43%  Left ventricular apical thrombus  Left atrial pressure 14 mmHg  There is a visual suggestion of mild aortic stenosis. (Doppler assessment of the aortic valve is suboptimal)  Mild mitral  regurgitation  Normal right ventricle dimensions and systolic function: TAPSE 1.84 cm  Mild tricuspid regurgitation  PASP 34 mmHg  Normal left atrial volume index  No pericardial effusion or vegetations  AS-V pacing  Technically difficult study: No parasternal imaging obtained  This study was performed with Definity contrast to optimize evaluation of regional and global left ventricular function  No significant change from 3/28/2023 except for current demonstration of left ventricular apical thrombus      Telemetry  V-paced 80s    Assessment & Plan    * Cerebrovascular accident (CVA), unspecified mechanism (CMS/[facility])  Assessment & Plan  Patient with acute CVA in Left MCA territory.  There was a concern for possible bleed on initial CT scan but repeat with no hemorrhage but 3mm midline shift    -Cont ASA and Statin.  -Management per Neurology   -No evidence of atrial fibrillation on telemetry, monitor  -ECHO 6/27/23: LVEF 43%, localized apical aneurysm containing thrombus, possible mild AS, mild MR, normal RV, PASP 34.   -Recommend eliquis 5 mg BID for apical aneurysm thrombus, but would delay initiation in setting of acute stroke. Appreciate neurology recommendations.     Cardiac pacemaker  Assessment & Plan  Hx of heart block. S/p medtronic pacemaker.   V paced on telemetry   Follows with Dr. Carolina     No changes to management     Coronary artery disease involving native coronary artery of native heart with angina pectoris (CMS/[facility])  Assessment & Plan  non-STEMI 4/26/19, three-vessel CAD; status post FRANK x 2 LAD 4/27/19 + FRANK x 4 RCA 4/29/19 + FRANK x 1 LCX OM2 5/2/19    Continue ASA and Statin.  Plavix was discontinued due to epistaxis.     Ischemic cardiomyopathy  Assessment & Plan  Echo 6/27/23 stable EF 45-50%, follows with Dr. Salter. In past recommended Entresto ( was cost prohibitive) and then Valsartan but not started due to concerns of dizziness.      Once outside the permissive hypertensive window and NPO  status, would start LesrtARIAN Ventura  6/28/2023  1:45 PM     This patient note has been dictated using speech recognition software. Inadvertent speech recognition errors should be disregarded. Please do not hesitate to call my office for clarifications.

## 2023-06-28 NOTE — PROGRESS NOTES
Hospital Medicine Service -  Daily Progress Note       SUBJECTIVE     Interval History: No acute events overnight. Awake, still aphasia, Following, Understanding.   Less Right facial droop, R. Hemiparesis improved.   PO diet ordered per SLP's recommendation: puree + Mild thick.  Monitor: Paced rhythm   IVF  V/S stable.     OBJECTIVE        Vital signs in last 24 hours:  Temp:  [36.7 °C (98 °F)-37.1 °C (98.8 °F)] 37.1 °C (98.8 °F)  Heart Rate:  [] 75  Resp:  [18] 18  BP: (106-177)/(51-90) 113/58  I/O last 3 completed shifts:  In: 2948 [I.V.:2948]  Out: 775 [Urine:775]    PHYSICAL EXAMINATION        GEN: well-developed and well-nourished; not in acute distress  HEENT: normocephalic; atraumatic  NECK: no JVD; no bruits  CARDIO: regular rate and rhythm; no murmurs or rubs  RESP: clear to auscultation bilaterally; no rales, rhonchi, or wheezes  ABD: soft, non-distended, non-tender, normal bowel sounds  EXT: no cyanosis, clubbing, or edema  SKIN: clean, dry, warm, and intact  MUSCULOSKELETAL: no injury or deformity  NEURO: alert and Aphasia, unable to answer, following, Right facial droop, Right side Motor 4~5/5, unable to walk.  BEHAVIOR/EMOTIONAL: appropriate; cooperative     LABS / IMAGING / TELE        Labs  Lab Results   Component Value Date    WBC 7.64 06/28/2023    HGB 13.9 06/28/2023    HCT 42.5 06/28/2023    MCV 94.0 06/28/2023     06/28/2023     Lab Results   Component Value Date    GLUCOSE 104 (H) 06/28/2023    CALCIUM 8.8 06/28/2023     06/28/2023    K 3.5 06/28/2023    CO2 19 (L) 06/28/2023     (H) 06/28/2023    BUN 12 06/28/2023    CREATININE 0.7 06/28/2023     Lab Results   Component Value Date    INR 1.1 06/27/2023    INR 1.0 06/26/2023    INR 1.0 11/18/2019       Imaging  Transthoracic echo (TTE) complete    Addendum Date: 6/27/2023    Left ventricle with normal dimensions and regional contraction abnormalities consistent with CAD in the LAD distribution: Localized apical  infarction with aneurysm formation.  Paradoxical septal movement is consistent with ventricular pacing.  There is moderate left ventricular systolic and mild diastolic dysfunction.  LVEF 43% Left ventricular apical thrombus Left atrial pressure 14 mmHg There is a visual suggestion of mild aortic stenosis. (Doppler assessment of the aortic valve is suboptimal) Mild mitral regurgitation Normal right ventricle dimensions and systolic function: TAPSE 1.84 cm Mild tricuspid regurgitation PASP 34 mmHg Normal left atrial volume index No pericardial effusion or vegetations AS-V pacing Technically difficult study: No parasternal imaging obtained This study was performed with Definity contrast to optimize evaluation of regional and global left ventricular function No significant change from 3/28/2023 except for current demonstration of left ventricular apical thrombus    CT HEAD WITHOUT IV CONTRAST    Result Date: 6/27/2023  IMPRESSION: Acute/subacute left MCA distribution infarct with 3 mm midline shift.. No definite hemorrhage COMMENT: Technique: Computed tomography of the brain was performed utilizing contiguous 2.5 mm transaxial sections without intravenous contrast administration. CT DOSE:  One or more dose reduction techniques (e.g. automated exposure control, adjustment of the mA and/or kV according to patient size, use of iterative reconstruction technique) utilized for this examination. Comparison studies: Head CT dated June 26, 2023. Postsurgical change: None. Brain parenchyma: There is a large area of poor gray-white matter differentiation in the left frontal parietal lobe and subinsular region with 3 mm of midline shift.. No definite hemorrhage. There is intracranial atherosclerotic disease. White matter changes: Normal for age Ventricles, cisterns, and sulci: Normal in size and configuration. Sella and cerebellar tonsils: Normal in appearance. Calvarium and extra cranial soft tissues: Normal. Paranasal sinuses:  Clear bilaterally. Mastoid air cell: Normal Orbits: Normal     CT HEAD WITHOUT IV CONTRAST    Result Date: 6/26/2023  IMPRESSION: Evolving left MCA territory infarct.  Small hyperdense foci within the infarct may reflect contrast staining from interval angiography versus punctate hemorrhage.  Short interval follow-up recommended.    CT HEAD STROKE ALERT WITHOUT IV CONTRAST    Result Date: 6/26/2023  IMPRESSION: 1. Findings highly concerning for an acute infarct in the posterior aspect of the left MCA territory with an associated thrombosed vessel. 2. Chronic microangiopathy and involutional changes. Finding:    Stroke alert   Acuity: Critical  Status:  CLOSED Critical read back was performed and results were read back by ARIAN Washington, on 6/26/2023 at 3:39 PM.     CT ANGIOGRAPHY HEAD/NECK WITH AND WITHOUT IV CONTRAST    Result Date: 6/26/2023  IMPRESSION: 1. Atherosclerosis at the carotid bifurcations with mild luminal narrowing measuring approximately 25% on the right and 20% on the left using NASCET criteria. Patent cervical carotid and vertebral arteries. 2. Termination of flow in an M3 branch of the left middle cerebral artery. 3. Intracranial atherosclerosis with significant narrowing involving the left V4 segment after the takeoff of the posterior inferior cerebellar artery. Mild irregularity of the basilar artery. 4. Completed infarct in the posterior aspect of the left MCA territory. No evidence of ischemic penumbra. rCBF <30% : 0 cc TMax > 6s: 2 cc Mismatch volume: 2 cc Mismatch Ratio: NA 5. Additional chronic and incidental findings, as above. In order to accelerate response time, other vascular pathology including occlusions of more distal arteries are not completely evaluated on this examination. Finding:    Stroke alert   Acuity: Critical  Status:  CLOSED Critical read back was performed and results were read back by Dr. Jordan, on 6/26/2023 at 4:03 PM. Critical read back was also performed by Dr. Aparicio  and the results were critically read back at 4:07 PM.     CT BRAIN PERFUSION WITH IV CONTRAST    Result Date: 6/26/2023  IMPRESSION: 1. Atherosclerosis at the carotid bifurcations with mild luminal narrowing measuring approximately 25% on the right and 20% on the left using NASCET criteria. Patent cervical carotid and vertebral arteries. 2. Termination of flow in an M3 branch of the left middle cerebral artery. 3. Intracranial atherosclerosis with significant narrowing involving the left V4 segment after the takeoff of the posterior inferior cerebellar artery. Mild irregularity of the basilar artery. 4. Completed infarct in the posterior aspect of the left MCA territory. No evidence of ischemic penumbra. rCBF <30% : 0 cc TMax > 6s: 2 cc Mismatch volume: 2 cc Mismatch Ratio: NA 5. Additional chronic and incidental findings, as above. In order to accelerate response time, other vascular pathology including occlusions of more distal arteries are not completely evaluated on this examination. Finding:    Stroke alert   Acuity: Critical  Status:  CLOSED Critical read back was performed and results were read back by Dr. Jordan, on 6/26/2023 at 4:03 PM. Critical read back was also performed by Dr. Aparicio and the results were critically read back at 4:07 PM.     X-RAY CHEST 1 VIEW    Result Date: 6/26/2023  IMPRESSION: No active disease in the chest.       ECG/Telemetry  I have independently reviewed the telemetry. No events for the last 24 hours.    ASSESSMENT AND PLAN      Left ventricular apical thrombus  Assessment & Plan  Per TTE report on 6/19  Waiting for Dr. Aparicio's recommendation: AC    Elevated blood pressure reading with diagnosis of hypertension  Assessment & Plan  hold home meds  Fall precautions  Aspiration precautions  Consult pt/ot  Consult case mgt  bp parameters for hypotension  dvt prophylaxis with  scd  Pt is full code  Spent 55 minutes assessing planning for patient care    6/27~  Stable today      Cardiac pacemaker  Assessment & Plan  Normal Function on monitor    Ischemic cardiomyopathy  Assessment & Plan  Monitor on telemetry  N.p.o. except medication  Trend troponin  Repeat ECG  Received 325 mg ASA, will continue 81 mg daily  Dr. Lorenzo's consult appreciated.  TTE: EF 43%, Left ventricular apical thrombus  On daily ASA 81 mg    * Cerebrovascular accident (CVA), unspecified mechanism (CMS/HCC)  Assessment & Plan  Brain CT x3: Left MCA territory Ischemia. No Hemorrhage    Monitor on PCU  Aphasic and right-sided weakness  Neurochecks/NIH  No MRI brain, MRA head/neck ordered Due to PPM  Obtain ECHO  Trend troponin  Stabilizing BP  Check Lipid panel, A1c  Received 325 mg ASA, will continue 81 mg daily  Continue home regimen   IV Hydralazine SBP > 180  Neurology consult Dr. Aparicio saw her , appreciated.    6/28  Neuro Status stable.  SLP recommended Puree Diet with mild Thick  On Daily ASA 81 mg  TTE reported Left ventricular apical thrombus   D/w Dr. Aparicio regarding the AC Tx     PT/OT evaluation  PM&R for [facility]         VTE Assessment: [place]    Code Status: Full Code  Estimated discharge date: 6/29/2023     Richard Pandya MD  6/28/2023  1:30 PM

## 2023-06-28 NOTE — PROGRESS NOTES
NEUROLOGY PROGRESS NOTE    Subjective     Interval History:    Patient continues to have aphasia  POA at bedside        Current Facility-Administered Medications   Medication Dose Route Frequency Provider Last Rate Last Admin   • acetaminophen (TYLENOL) tablet 650 mg  650 mg oral q6h PRN Kelby Martinez MD       • aspirin enteric coated tablet 81 mg  81 mg oral Daily Kelby Martinez MD   81 mg at 06/28/23 1433   • cycloSPORINE (RESTASIS) 0.05 % ophthalmic emulsion 1 drop  1 drop Both Eyes q12h [facility] Richard Pandya MD       • glucose chewable tablet 16-32 g of dextrose  16-32 g of dextrose oral PRN Kelby Martinez MD        Or   • dextrose 40 % oral gel 15-30 g of dextrose  15-30 g of dextrose oral PRN Kelby Martinez MD        Or   • glucagon (GLUCAGEN) injection 1 mg  1 mg intramuscular PRN Kelby Martinez MD        Or   • dextrose 50 % in water (D50) injection 12.5 g  25 mL intravenous PRN Kelby Martinez MD       • heparin infusion in D5W 100 units/mL  100-4,000 Units/hr intravenous Titrated Richard Pandya MD       • hydrALAZINE (APRESOLINE) injection 10 mg  10 mg intravenous q4h PRN Kelby Martinez MD   10 mg at 06/27/23 2144   • LORazepam (ATIVAN) injection 1 mg  1 mg intravenous Once in imaging Kelby Martinez MD       • magnesium sulfate IVPB 2g in 50 mL NSS/D5W/SWFI  2 g intravenous PRN Kelby Martinez MD       • ondansetron (ZOFRAN) injection 4 mg  4 mg intravenous q8h PRN Kelby Martinez MD       • pantoprazole (PROTONIX) tablet,delayed release (DR/EC) 40 mg  40 mg oral Daily Richard Pandya MD   40 mg at 06/28/23 1431   • potassium chloride (KLOR-CON M) ER tablet (particles/crystals) 20 mEq  20 mEq oral PRN Kelby Martinez MD       • potassium chloride (KLOR-CON M) ER tablet (particles/crystals) 40 mEq  40 mEq oral PRN Kelby Martinez MD       • potassium chloride 20 mEq in 100 mL IVPB  (premix)  20 mEq intravenous PRN Kelby Martinez MD       • potassium chloride 20 mEq in 100 mL IVPB   (premix)  20 mEq intravenous PRN Kelby Martinez MD        And   • potassium chloride 20 mEq in 100 mL IVPB  (premix)  20 mEq intravenous PRN Kelby Martinez MD       • rosuvastatin (CRESTOR) tablet 20 mg  20 mg oral Nightly Richard Pandya MD       • senna (SENOKOT) tablet 1 tablet  1 tablet oral 2x daily PRN Kelby Martinez MD       • sodium chloride 0.9 % infusion   intravenous Continuous Kelby Martinez MD 80 mL/hr at 06/28/23 0820 New Bag at 06/28/23 0820       Ace inhibitors, Atorvastatin, Brilinta [ticagrelor], Codeine, Dilaudid [hydromorphone], Morphine sulfate, Onion, and Oxycodone       Objective     Vital signs in last 24 hours:  Temp:  [36.7 °C (98 °F)-37.1 °C (98.8 °F)] 37.1 °C (98.8 °F)  Heart Rate:  [] 75  Resp:  [18] 18  BP: (106-177)/(51-90) 113/58    Physical Exam:    General appearance: Lying in the bed and appears comfortable  Eyes: anicteric sclerae, moist conjunctivae; no lid-lag;   HENT: Atraumatic; oropharynx clear with moist mucous membranes and no mucosal ulcerations; normal hard and soft palate  Neck: Trachea midline; , supple.  Lungs: CTA, with normal respiratory effort and no intercostal retractions  CV: RRR, no MRGs  Abdomen: Soft, non-tender; no masses or HSM  Extremities: No peripheral edema. Pulses are intact  Skin: no rash, ulcers           Neurological Examination  Awake and alert  Right lowe upper and lower facial  Severe aphasia: Comprehension appears to be intact but unable to form words.  Unable to repeat, unable to name  Right upper extremity: 1/5 at the wrist extension, 2/5 elbow extension able to hold antigravity for 2 to 3 seconds but pronator drift  Right lower extremity 3/5    Left upper and lower extremities 5/5    LABS:  I have reviewed the patient's lab results, and noted pertinent findings. I have reviewed the labs as noted below:    LDL 88  HbA1c 5.4        IMAGING:  I have personally reviewed the images for the studies listed below.     CTA head and neck  6/26/2023  IMPRESSION:  1. Atherosclerosis at the carotid bifurcations with mild luminal narrowing  measuring approximately 25% on the right and 20% on the left using NASCET  criteria. Patent cervical carotid and vertebral arteries.  2. Termination of flow in an M3 branch of the left middle cerebral artery.  3. Intracranial atherosclerosis with significant narrowing involving the left V4  segment after the takeoff of the posterior inferior cerebellar artery. Mild  irregularity of the basilar artery.  4. Completed infarct in the posterior aspect of the left MCA territory. No  evidence of ischemic penumbra.  rCBF <30% : 0 cc  TMax > 6s: 2 cc  Mismatch volume: 2 cc  Mismatch Ratio: NA  5. Additional chronic and incidental findings, as above.     CT head w/o contrast 6/27/2023  IMPRESSION: Acute/subacute left MCA distribution infarct with 3 mm midline  shift.. No definite hemorrhage       OTHER RESULTS:  TTE   • Left ventricle with normal dimensions and regional contraction abnormalities consistent with CAD in the LAD distribution: Localized apical infarction with aneurysm formation.  Paradoxical septal movement is consistent with ventricular pacing.  There is moderate left ventricular systolic and mild diastolic dysfunction.  LVEF 43%  • Left ventricular apical thrombus  • Left atrial pressure 14 mmHg  • There is a visual suggestion of mild aortic stenosis. (Doppler assessment of the aortic valve is suboptimal)  • Mild mitral regurgitation  • Normal right ventricle dimensions and systolic function: TAPSE 1.84 cm  • Mild tricuspid regurgitation  • PASP 34 mmHg  • Normal left atrial volume index  • No pericardial effusion or vegetations  • AS-V pacing  • Technically difficult study: No parasternal imaging obtained  • This study was performed with Definity contrast to optimize evaluation of regional and global left ventricular function  • No significant change from 3/28/2023 except for current demonstration of left  ventricular apical thrombus            ASSESSMENT AND PLAN:    78-year-old female with past medical history stated above brought to the hospital for right-sided weakness.     Unclear last known well so not a candidate for IV thrombolytics.CT head and neck and CT perfusion was done as some patients with large ischemic core benefited from mechanical intervention.  CT head and neck no obvious LVO on my review     Problem list  Acute left MCA ischemic stroke with hemorrhagic conversion.  Mass effect 3 mm left-to-right midline shift.  Left ventricular apical thrombus    6/27/2023:  Even though the head CT shows radiological evidence of mass effect, the patient at bedside appears clinically stable without any worsening of examination.  So we will hold off hypertonic saline.    6/28/2023  Transthoracic echo showed left ventricular apical thrombus.  Discussed with POA at bedside about risks and benefits of heparin.  Decision was made to start heparin.      RECOMMENDATIONS:      Etiology of stroke  cardioembolic  Telemetry/stroke order set  Start heparin drip without a bolus.  Close neuro monitoring.  Stat head CT if there is exam change.  Goal blood pressure normotension.  Goal LDL < 70 and normoglycemia  DVT prophylaxis SCDs only for now  PT/OT/SLP eval and treat   We will follow-up    D/W Dr. Mumtaz MURPHY have personally seen this patient, reviewed the history and all the available data, performed the physical examination as documented above, and formulated the plan of care. Also discussed with patient about the side effects of the medications. All questions the patient and her/his family have are answered.    Total time spent 52 minutes on this encounter which includes face-to-face with the patient, independent review of the test, obtaining history, performing medical examination, and care coordination.  More than half of the time was spent on counseling and coordination of care.    Thank you for allowing me  to participate in the care of your patient.  If you have any further questions, please do not hesitate to contact me.    This encounter was completed utilizing the Direct Speech Voice Recognition Software. Grammatical errors, random word insertions, pronoun errors, and incomplete sentences are occasional consequences of the system due to software limitation, ambient noise, and hardware issues. These may be missed by proof reading prior to affixing electronic signature. Any questions or concerns about the content, text, or information contained within the body of this dictation should be directly addressed to the physician for clarification. If you have any questions or concerns please do not hesitate to call me directly.       Ney Aparicio MD  Neurology

## 2023-06-28 NOTE — PROGRESS NOTES
PMR    Chart reviewed.  78-year-old female status post CVA, right hemiparesis, aphasia and dysphagia.  PT and OT eval was completed and requires moderate assistance for mobility.  Seen by speech currently NPO.  Awaiting outcome of speech assessment for appropriate diet  Anticipate will need video swallow.  We will continue to monitor  Full consult to follow

## 2023-06-29 LAB
APTT PPP: 137 SEC (ref 23–35)
APTT PPP: 31 SEC (ref 23–35)
APTT PPP: 63 SEC (ref 23–35)
APTT PPP: 70 SEC (ref 23–35)
ERYTHROCYTE [DISTWIDTH] IN BLOOD BY AUTOMATED COUNT: 13 % (ref 11.7–14.4)
HCT VFR BLDCO AUTO: 36.8 % (ref 35–45)
HGB BLD-MCNC: 12.4 G/DL (ref 11.8–15.7)
MCH RBC QN AUTO: 31.2 PG (ref 28–33.2)
MCHC RBC AUTO-ENTMCNC: 33.7 G/DL (ref 32.2–35.5)
MCV RBC AUTO: 92.7 FL (ref 83–98)
PDW BLD AUTO: 9 FL (ref 9.4–12.3)
PLATELET # BLD AUTO: 142 K/UL (ref 150–369)
RBC # BLD AUTO: 3.97 M/UL (ref 3.93–5.22)
TROPONIN I SERPL HS-MCNC: 8.2 PG/ML
WBC # BLD AUTO: 5.36 K/UL (ref 3.8–10.5)

## 2023-06-29 PROCEDURE — 36415 COLL VENOUS BLD VENIPUNCTURE: CPT | Performed by: INTERNAL MEDICINE

## 2023-06-29 PROCEDURE — 92526 ORAL FUNCTION THERAPY: CPT | Mod: GN

## 2023-06-29 PROCEDURE — 85730 THROMBOPLASTIN TIME PARTIAL: CPT | Performed by: INTERNAL MEDICINE

## 2023-06-29 PROCEDURE — 20600000 HC ROOM AND CARE INTERMEDIATE/TELEMETRY

## 2023-06-29 PROCEDURE — 84484 ASSAY OF TROPONIN QUANT: CPT | Performed by: EMERGENCY MEDICINE

## 2023-06-29 PROCEDURE — 99232 SBSQ HOSP IP/OBS MODERATE 35: CPT | Performed by: INTERNAL MEDICINE

## 2023-06-29 PROCEDURE — 63600000 HC DRUGS/DETAIL CODE: Mod: JZ | Performed by: INTERNAL MEDICINE

## 2023-06-29 PROCEDURE — 85730 THROMBOPLASTIN TIME PARTIAL: CPT | Performed by: HOSPITALIST

## 2023-06-29 PROCEDURE — 63700000 HC SELF-ADMINISTRABLE DRUG: Performed by: HOSPITALIST

## 2023-06-29 PROCEDURE — 85027 COMPLETE CBC AUTOMATED: CPT | Performed by: HOSPITALIST

## 2023-06-29 PROCEDURE — 99233 SBSQ HOSP IP/OBS HIGH 50: CPT | Performed by: HOSPITALIST

## 2023-06-29 RX ORDER — LORAZEPAM 2 MG/ML
0.5 INJECTION INTRAMUSCULAR ONCE
Status: COMPLETED | OUTPATIENT
Start: 2023-06-29 | End: 2023-06-29

## 2023-06-29 RX ADMIN — PANTOPRAZOLE SODIUM 40 MG: 40 TABLET, DELAYED RELEASE ORAL at 08:26

## 2023-06-29 RX ADMIN — SODIUM CHLORIDE: 9 INJECTION, SOLUTION INTRAVENOUS at 19:59

## 2023-06-29 RX ADMIN — ROSUVASTATIN CALCIUM 20 MG: 20 TABLET, FILM COATED ORAL at 20:46

## 2023-06-29 RX ADMIN — LORAZEPAM 0.5 MG: 2 INJECTION INTRAMUSCULAR; INTRAVENOUS at 00:10

## 2023-06-29 RX ADMIN — ASPIRIN 81 MG: 81 TABLET, COATED ORAL at 08:26

## 2023-06-29 RX ADMIN — CYCLOSPORINE 1 DROP: 0.5 EMULSION OPHTHALMIC at 20:46

## 2023-06-29 ASSESSMENT — COGNITIVE AND FUNCTIONAL STATUS - GENERAL
STANDING UP FROM CHAIR USING ARMS: 2 - A LOT
REMEMBERING 5 ERRANDS WITH NO LIST: 1 - UNABLE
WALKING IN HOSPITAL ROOM: 2 - A LOT
MOVING TO AND FROM BED TO CHAIR: 3 - A LITTLE
MOVING TO AND FROM BED TO CHAIR: 3 - A LITTLE
CLIMB 3 TO 5 STEPS WITH RAILING: 2 - A LOT
FOLLOWS FAMILIAR CONVERSATION: 2 - A LOT
WALKING IN HOSPITAL ROOM: 2 - A LOT
REMEMBERING WHERE THINGS ARE: 2 - A LOT
CLIMB 3 TO 5 STEPS WITH RAILING: 2 - A LOT
STANDING UP FROM CHAIR USING ARMS: 2 - A LOT
UNDERSTANDING 10 TO 15 MIN SPEECH: 1 - UNABLE
AFFECT: ANXIOUS
REMEMBERING TO TAKE MEDICATION: 1 - UNABLE
TAKING CARE OF COMPLICATED TASKS: 1 - UNABLE

## 2023-06-29 NOTE — CONSULTS
Physical Medicine and Rehabilitation Consult Note    Subjective     Jennifer Sams is a 78 y.o. female who was admitted for NSTEMI (non-ST elevated myocardial infarction) (CMS/formerly [facility]) [I21.4]  Coronary artery disease involving native coronary artery of native heart with angina pectoris (CMS/formerly [facility]) [I25.119]  Cerebrovascular accident (CVA), unspecified mechanism (CMS/formerly [facility]) [I63.9]. Patient was referred by Dr. Donald for evaluate rehab needs. Patient is a 78-year-old female with a history of AAA, glaucoma, hyperlipidemia, CAD admitted 6/26 when found to be confused and combative.  Upon presentation to the ED she was aphasic with right-sided weakness.  She was hypertensive.  CT of the head showed an acute infarct in the left MCA region with mild bleeding.  TTE showed an EF of 43%.  Bubble study was negative.  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  She is currently on a heparin drip.  Neurology is following to determine initiation of DOAC  Speech is clear for puréed and mildly thick liquid diet.  That should note patient is essentially nonverbal and I am unable to obtain any history directly from her.  She does not appear to be in apparent distress.  She was seen by PT and OT needing moderate assistance for mobility.  Pertinent radiology results reviewed. Pertinent lab results reviewed..    Medical History:   Past Medical History:   Diagnosis Date   • AAA (abdominal aortic aneurysm)    • Arthritis    • Elevated blood pressure reading without diagnosis of hypertension 4/19/2019   • Epistaxis 1/6/2020   • GERD (gastroesophageal reflux disease) 4/19/2019   • Glaucoma 4/19/2019   • Heart murmur    • Hiatal hernia    • History of hip replacement, total, right 4/19/2019    Right hip 9/ 2016 and revision 2017    • History of rheumatic fever as a child 4/19/2019   • Hypercholesterolemia 4/19/2019   • Ischemic cardiomyopathy 5/9/2019   • Kidney cysts    • Osteoarthritis of multiple joints 4/19/2019   •  Osteoporosis    • Pacemaker 12/9/2019   • Raynaud's disease 4/19/2019   • RSD (reflex sympathetic dystrophy) 4/19/2019    Of left foot   • SOB (shortness of breath) 12/10/2019   • Status post insertion of drug eluting coronary artery stent 5/9/2019       Surgical History:   Past Surgical History:   Procedure Laterality Date   • CHOLECYSTECTOMY OPEN     • CORONARY ANGIOPLASTY WITH STENT PLACEMENT  04/29/2019    of LAD   • CORONARY ANGIOPLASTY WITH STENT PLACEMENT  04/30/2019    of RCA   • DILATION AND CURETTAGE OF UTERUS     • EYE SURGERY      RIGHT CATARACT   • FOOT SURGERY Left    • JOINT REPLACEMENT Right 09/2016    total hip   • REVISION TOTAL HIP ARTHROPLASTY Right 2017   • TONSILLECTOMY         Social History:   Social History   She was living alone in a two-story home apparently was completely independent.  Social History Narrative   • Not on file     Lives with:    Prior Function Level: Prior Level of Function  Ambulation: independent  Transferring: independent  Toileting: independent  Bathing: independent  Dressing: independent  Eating: independent  IADLs: independent  Communication: understands/communicates without difficulty  Swallowing: swallows foods/liquids without difficulty  Baseline Diet/Method of Nutritional Intake: thin liquids, regular  Past History of Dysphagia: No known  Prior Level of Function Comment: PLOF provided by patient's friend, Samara, due to patient with severe aphasia  Family History:   Family History   Problem Relation Age of Onset   • Congenital heart disease Biological Mother         noted at autopsy   • Pulmonary fibrosis Biological Father    • Heart attack Paternal Grandfather      History also provided by:   Allergies: Ace inhibitors, Atorvastatin, Brilinta [ticagrelor], Codeine, Dilaudid [hydromorphone], Morphine sulfate, Onion, and Oxycodone    Current Inpatient Medications   Medication Dose Route Frequency Provider Last Rate Last Admin   • acetaminophen (TYLENOL) tablet 650  mg  650 mg oral q6h PRN Kelby Martinez MD       • aspirin enteric coated tablet 81 mg  81 mg oral Daily Kelby Martinez MD   81 mg at 06/29/23 0826   • cycloSPORINE (RESTASIS) 0.05 % ophthalmic emulsion 1 drop  1 drop Both Eyes q12h [facility] Richard Pandya MD   1 drop at 06/28/23 2132   • glucose chewable tablet 16-32 g of dextrose  16-32 g of dextrose oral PRN Kelby Martinez MD        Or   • dextrose 40 % oral gel 15-30 g of dextrose  15-30 g of dextrose oral PRN Kelby Martinez MD        Or   • glucagon (GLUCAGEN) injection 1 mg  1 mg intramuscular PRN Kelby Martinez MD        Or   • dextrose 50 % in water (D50) injection 12.5 g  25 mL intravenous PRN Kelby Martinez MD       • heparin infusion in D5W 100 units/mL  100-4,000 Units/hr intravenous Titrated Richard Pandya MD   Stopped at 06/29/23 0723   • hydrALAZINE (APRESOLINE) injection 10 mg  10 mg intravenous q4h PRN Kelby Matrinez MD   10 mg at 06/28/23 2249   • LORazepam (ATIVAN) injection 1 mg  1 mg intravenous Once in imaging Kelby Martinez MD       • magnesium sulfate IVPB 2g in 50 mL NSS/D5W/SWFI  2 g intravenous PRN Kelby Martinez MD       • ondansetron (ZOFRAN) injection 4 mg  4 mg intravenous q8h PRN Kelby Martinez MD       • pantoprazole (PROTONIX) tablet,delayed release (DR/EC) 40 mg  40 mg oral Daily Richard Pandya MD   40 mg at 06/29/23 0826   • potassium chloride (KLOR-CON M) ER tablet (particles/crystals) 20 mEq  20 mEq oral PRN Kelby Martinez MD       • potassium chloride (KLOR-CON M) ER tablet (particles/crystals) 40 mEq  40 mEq oral PRN Kelby Martinez MD       • potassium chloride 20 mEq in 100 mL IVPB  (premix)  20 mEq intravenous PRN Kelby Martinez MD       • potassium chloride 20 mEq in 100 mL IVPB  (premix)  20 mEq intravenous PRN Kelby Martinez MD        And   • potassium chloride 20 mEq in 100 mL IVPB  (premix)  20 mEq intravenous PRN Kelby Martinez MD       • rosuvastatin (CRESTOR) tablet 20 mg  20 mg oral  "Nightly Richard Pandya MD   20 mg at 06/28/23 2132   • senna (SENOKOT) tablet 1 tablet  1 tablet oral 2x daily PRN Kelby Martinez MD       • sodium chloride 0.9 % infusion   intravenous Continuous Kelby Martinez MD 80 mL/hr at 06/28/23 0820 New Bag at 06/28/23 0820        Medication List      ASK your doctor about these medications    amoxicillin 500 mg capsule  Commonly known as: AMOXIL  Take 4 capsules (2,000 mg total) by mouth as needed (one hour prior to dental cleaning).  Dose: 2,000 mg     aspirin 81 mg enteric coated tablet  Commonly known as: ASPIR-81  Take 1 tablet (81 mg total) by mouth daily.  Dose: 81 mg     cycloSPORINE 0.05 % ophthalmic emulsion  Commonly known as: RESTASIS  Administer 1 drop into both eyes 2 (two) times a day.  Dose: 1 drop     nitroglycerin 0.4 mg SL tablet  Commonly known as: NITROSTAT  Place 1 tablet (0.4 mg total) under the tongue every 5 (five) minutes as needed for chest pain.  Dose: 0.4 mg     pantoprazole 40 mg EC tablet  Commonly known as: PROTONIX  Take 40 mg by mouth daily.  Dose: 40 mg     rosuvastatin 20 mg tablet  Commonly known as: CRESTOR  TAKE 1 TABLET BY MOUTH EVERY DAY          Review of Systems  Review of systems not obtained due to patient factors.    Objective   Labs  I have reviewed the patient's labs.  Current labs are within normal limits.    Imaging  I have reviewed the Imaging from the last 24 hrs.    Telemetry:   I have independently reviewed the patient's telemtry. All telemetry are within normal limits.    Physical Exam  Visit Vitals  /61 (BP Location: Right upper arm, Patient Position: Lying)   Pulse 72   Temp 36.7 °C (98.1 °F) (Oral)   Resp 18   Ht 1.702 m (5' 7\")   Wt 63.8 kg (140 lb 9.6 oz)   SpO2 96%   BMI 22.02 kg/m²     General appearance: well-developed, well-nourished and alert  Eyes: negative findings: conjunctivae and sclerae normal and pupils equal, round, reactive to light and accomodation  Neck: supple, no carotid bruit and no " adenopathy  Lungs: clear to auscultation bilaterally  Heart: regular rate and rhythm, S1, S2 normal, no murmur, click, rub or gallop  Abdomen: soft, non-tender, bowel sounds normal and no masses/no organomegaly  Extremities: extremities normal, warm and well-perfused; no cyanosis, clubbing, or edema  Neurologic: Motor Exam: Expressive and receptive aphasia.  Right facial droop.  Synergistic movement of the right upper extremity.  She has isolated movements in the right lower extremity.  There is right Babinski reflex elicited.        Assessment   78 y.o. female being consulted for evaluate rehab needs  Plan of care was discussed with Case management           Plan     Ms. Sams is a 78-year-old female with an ADL and ambulatory dysfunction speech and swallow dysfunction status post left MCA CVA with right hemiparesis, aphasia and dysphagia.  Apparently was completely independent prior to this event.  Given her current functional deficits and high degree of medical complexity recommendation once stable is referral to acute level inpatient rehabilitation for comprehensive rehab program with 3 hours daily physical, occupational and speech therapies with medical management and oversight by physiatry.  Goals are to work on improvement in functional mobility, neuromuscular reeducation, assess ADLs and need for adaptive equipment, assess speech and swallow function and upgrade diet, caregiver training and education and establishing a safe discharge plan.  Her estimated length of stay is 21 days.  Case management assisting with discharge planning.

## 2023-06-29 NOTE — PROGRESS NOTES
NEUROLOGY PROGRESS NOTE    Subjective     Interval History:    Patient continues to have aphasia  No acute events overnight        Current Facility-Administered Medications   Medication Dose Route Frequency Provider Last Rate Last Admin   • acetaminophen (TYLENOL) tablet 650 mg  650 mg oral q6h PRN Kelby Martinez MD       • aspirin enteric coated tablet 81 mg  81 mg oral Daily Kelby Martinez MD   81 mg at 06/29/23 0826   • cycloSPORINE (RESTASIS) 0.05 % ophthalmic emulsion 1 drop  1 drop Both Eyes q12h ROBERTO Richard Pandya MD   1 drop at 06/28/23 2132   • glucose chewable tablet 16-32 g of dextrose  16-32 g of dextrose oral PRN Kelby Martinez MD        Or   • dextrose 40 % oral gel 15-30 g of dextrose  15-30 g of dextrose oral PRN Kelby Martinez MD        Or   • glucagon (GLUCAGEN) injection 1 mg  1 mg intramuscular PRN Kelby Martinez MD        Or   • dextrose 50 % in water (D50) injection 12.5 g  25 mL intravenous PRN Kelby Martinez MD       • heparin infusion in D5W 100 units/mL  100-4,000 Units/hr intravenous Titrated Richard Pandya MD   Stopped at 06/29/23 0723   • hydrALAZINE (APRESOLINE) injection 10 mg  10 mg intravenous q4h PRN Kelby Martinez MD   10 mg at 06/28/23 2249   • LORazepam (ATIVAN) injection 1 mg  1 mg intravenous Once in imaging Kelby Martinez MD       • magnesium sulfate IVPB 2g in 50 mL NSS/D5W/SWFI  2 g intravenous PRN Kelby Martinez MD       • ondansetron (ZOFRAN) injection 4 mg  4 mg intravenous q8h PRN Kelby Martinez MD       • pantoprazole (PROTONIX) tablet,delayed release (DR/EC) 40 mg  40 mg oral Daily Richard Pandya MD   40 mg at 06/29/23 0826   • potassium chloride (KLOR-CON M) ER tablet (particles/crystals) 20 mEq  20 mEq oral PRN Kelby Martinez MD       • potassium chloride (KLOR-CON M) ER tablet (particles/crystals) 40 mEq  40 mEq oral PRN Kelby Martinez MD       • potassium chloride 20 mEq in 100 mL IVPB  (premix)  20 mEq intravenous PRN Kelby Martinez  MD WAYNE       • potassium chloride 20 mEq in 100 mL IVPB  (premix)  20 mEq intravenous PRN Kelby Martinez MD        And   • potassium chloride 20 mEq in 100 mL IVPB  (premix)  20 mEq intravenous PRN Kelby Martinez MD       • rosuvastatin (CRESTOR) tablet 20 mg  20 mg oral Nightly Richard Pandya MD   20 mg at 06/28/23 2132   • senna (SENOKOT) tablet 1 tablet  1 tablet oral 2x daily PRN Kelby Martinez MD       • sodium chloride 0.9 % infusion   intravenous Continuous Kelby Martinez MD 80 mL/hr at 06/28/23 0820 New Bag at 06/28/23 0820       Ace inhibitors, Atorvastatin, Brilinta [ticagrelor], Codeine, Dilaudid [hydromorphone], Morphine sulfate, Onion, and Oxycodone       Objective     Vital signs in last 24 hours:  Temp:  [36.7 °C (98 °F)-37.1 °C (98.8 °F)] 36.7 °C (98.1 °F)  Heart Rate:  [69-86] 69  Resp:  [18] 18  BP: (101-171)/(54-81) 131/61    Physical Exam:    General appearance: Lying in the bed and appears comfortable  Eyes: anicteric sclerae, moist conjunctivae; no lid-lag;   HENT: Atraumatic; oropharynx clear with moist mucous membranes and no mucosal ulcerations; normal hard and soft palate  Neck: Trachea midline; , supple.  Lungs: CTA, with normal respiratory effort and no intercostal retractions  CV: RRR, no MRGs  Abdomen: Soft, non-tender; no masses or HSM  Extremities: No peripheral edema. Pulses are intact  Skin: no rash, ulcers           Neurological Examination  Awake and alert  Right lowe upper and lower facial  Severe aphasia: Comprehension appears to be intact but unable to form words.  Unable to repeat, unable to name  Right upper extremity: 1/5 at the wrist extension, 2/5 elbow extension able to hold antigravity for 2 to 3 seconds but pronator drift  Right lower extremity 3/5    Left upper and lower extremities 5/5    LABS:  I have reviewed the patient's lab results, and noted pertinent findings. I have reviewed the labs as noted below:    LDL 88  HbA1c 5.4        IMAGING:  I have  personally reviewed the images for the studies listed below.     CTA head and neck 6/26/2023  IMPRESSION:  1. Atherosclerosis at the carotid bifurcations with mild luminal narrowing  measuring approximately 25% on the right and 20% on the left using NASCET  criteria. Patent cervical carotid and vertebral arteries.  2. Termination of flow in an M3 branch of the left middle cerebral artery.  3. Intracranial atherosclerosis with significant narrowing involving the left V4  segment after the takeoff of the posterior inferior cerebellar artery. Mild  irregularity of the basilar artery.  4. Completed infarct in the posterior aspect of the left MCA territory. No  evidence of ischemic penumbra.  rCBF <30% : 0 cc  TMax > 6s: 2 cc  Mismatch volume: 2 cc  Mismatch Ratio: NA  5. Additional chronic and incidental findings, as above.     CT head w/o contrast 6/27/2023  IMPRESSION: Acute/subacute left MCA distribution infarct with 3 mm midline  shift.. No definite hemorrhage       OTHER RESULTS:  TTE 6/27/2023  • Left ventricle with normal dimensions and regional contraction abnormalities consistent with CAD in the LAD distribution: Localized apical infarction with aneurysm formation.  Paradoxical septal movement is consistent with ventricular pacing.  There is moderate left ventricular systolic and mild diastolic dysfunction.  LVEF 43%  • Left ventricular apical thrombus  • Left atrial pressure 14 mmHg  • There is a visual suggestion of mild aortic stenosis. (Doppler assessment of the aortic valve is suboptimal)  • Mild mitral regurgitation  • Normal right ventricle dimensions and systolic function: TAPSE 1.84 cm  • Mild tricuspid regurgitation  • PASP 34 mmHg  • Normal left atrial volume index  • No pericardial effusion or vegetations  • AS-V pacing  • Technically difficult study: No parasternal imaging obtained  • This study was performed with Definity contrast to optimize evaluation of regional and global left ventricular  function  • No significant change from 3/28/2023 except for current demonstration of left ventricular apical thrombus            ASSESSMENT AND PLAN:    78-year-old female with past medical history stated above brought to the hospital for right-sided weakness.     Unclear last known well so not a candidate for IV thrombolytics.CT head and neck and CT perfusion was done as some patients with large ischemic core benefited from mechanical intervention.  CT head and neck no obvious LVO on my review     Problem list  Acute left MCA ischemic stroke with hemorrhagic conversion.  Mass effect 3 mm left-to-right midline shift.  Left ventricular apical thrombus    6/27/2023:  Even though the head CT shows radiological evidence of mass effect, the patient at bedside appears clinically stable without any worsening of examination.  So we will hold off hypertonic saline.    6/28/2023  Transthoracic echo showed left ventricular apical thrombus.  Discussed with POA at bedside about risks and benefits of heparin.  Decision was made to start heparin.      RECOMMENDATIONS:      Etiology of stroke  cardioembolic  Telemetry/stroke order set  Continue heparin drip with Neuro protocol.   Close neuro monitoring.  Stat head CT if there is exam change.  Goal blood pressure normotension.  Goal LDL < 70 and normoglycemia  DVT prophylaxis SCDs only for now  PT/OT/SLP eval and treat   We will follow-up    D/W Dr. Mumtaz MURPHY have personally seen this patient, reviewed the history and all the available data, performed the physical examination as documented above, and formulated the plan of care. Also discussed with patient about the side effects of the medications. All questions the patient and her/his family have are answered.    Total time spent 36 minutes on this encounter which includes face-to-face with the patient, independent review of the test, obtaining history, performing medical examination, and care coordination.  More  than half of the time was spent on counseling and coordination of care.    Thank you for allowing me to participate in the care of your patient.  If you have any further questions, please do not hesitate to contact me.    This encounter was completed utilizing the Direct Speech Voice Recognition Software. Grammatical errors, random word insertions, pronoun errors, and incomplete sentences are occasional consequences of the system due to software limitation, ambient noise, and hardware issues. These may be missed by proof reading prior to affixing electronic signature. Any questions or concerns about the content, text, or information contained within the body of this dictation should be directly addressed to the physician for clarification. If you have any questions or concerns please do not hesitate to call me directly.       Ney Aparicio MD  Neurology

## 2023-06-29 NOTE — PROGRESS NOTES
"Patient:  Jennifer Sams  Location:  [facility] 3A 3031  MRN:  [id number]  Today's date:  6/29/2023    SLP Diagnosis  Severe expressive>receptive language deficits; apraxia; dysarthia; oral/pharyngeal dysphagia    Swallowing Recommendations  Diet Consistency pureed (PU4), soft and bite-sized (SB6)     Medication Administration crushed with pureed   Supervision Level 1:1 supervision needed   Feeding Recommendations allow patient to feed self if maintaining safety, allow extra time for meals, single cup sips only, small bites/sips, stop meal if showing signs of aspiration or fatigue (e.g., coughing, wet voice)   Posture Recommendations fully upright in chair/chair mode of bed   Swallowing Strategies alternate food and liquid intake   Instrumental Assessment Recommendations reassess via non-instrumental clinical swallow evaluation.     Comments Frequent oral care needed.     Summary/Handoff  Pt seen for follow up. Awake and alert. Nurse reports limited intake with meals and appears not to enjoy modified diet; no s/s distress. Friend Arnol at bedside reported pt is a picky eater. Pt alert and cooperative. Required cues to maintain attention to task once friend came to visit. Using gestures to comm, but not always clear. Non-verbal unless cued. Able to imitate vowels 3/5; imitated \"hi\" but no other words even with max cues. Singing with preserved melodic line but no words (humming, not jargon). Improved dysarthria noted today. Required cues for simple commands. Bio yes/no questions 100%; simple yes/no (non-bio) 60%. Jennifer puree and mildly thick liquids well, with no s/s distress. No anterior leak with mildly thick today despite impulsive intake. Thin trials with mod R sided anterior leak but slightly improved awareness of same, although unable to eliminate. Despite max cues (verbal, visual, tactile), cont with rapid impulsive intake leading to supsected posterior bolus loss with cough after swallow suspicious for " aspiration. Tx trials soft/bite sized solids presented. Pt needed max encouragement to accept trials due to indicated dislike of testing material. With small amounts accepted, she demo functional mastication and oral clearance, with no overt s/s distress. Appears appropriate to advance to soft/bite sized solids and to cont mildly thick liquids. May benefit from objective assessment of swallowing fxn if continues with s/s distress with thin liquids. D/w pt, friend, nursing; messaged  HMS with recommendations. Will follow.    Active Diet Orders (From admission, onward)     Start     Ordered    06/29/23 1114  Adult Diet Soft and Bite Sized SB6; Mildly Thick MT2; Cardiac (Low Sodium/Low Fat); RD/LDN may adjust order  Diet effective now        Question Answer Comment   Diet Texture Soft and Bite Sized SB6    Fluid Consistency: Mildly Thick MT2    Other Restriction(s): Cardiac (Low Sodium/Low Fat)    Delegation of Authority. Diet orders written by PA/CRLyssa may not be adjusted by RD/LDNs. RD/LDN may adjust order        06/29/23 1113                Jennifer is a 78 y.o. female admitted on 6/26/2023 with NSTEMI (non-ST elevated myocardial infarction) (CMS/HCC) [I21.4]  Coronary artery disease involving native coronary artery of native heart with angina pectoris (CMS/HCC) [I25.119]  Cerebrovascular accident (CVA), unspecified mechanism (CMS/HCC) [I63.9]. Principal problem is Cerebrovascular accident (CVA), unspecified mechanism (CMS/HCC).    Past Medical History  Jennifer has a past medical history of AAA (abdominal aortic aneurysm), Arthritis, Elevated blood pressure reading without diagnosis of hypertension (4/19/2019), Epistaxis (1/6/2020), GERD (gastroesophageal reflux disease) (4/19/2019), Glaucoma (4/19/2019), Heart murmur, Hiatal hernia, History of hip replacement, total, right (4/19/2019), History of rheumatic fever as a child (4/19/2019), Hypercholesterolemia (4/19/2019), Ischemic cardiomyopathy (5/9/2019), Kidney  cysts, Osteoarthritis of multiple joints (4/19/2019), Osteoporosis, Pacemaker (12/9/2019), Raynaud's disease (4/19/2019), RSD (reflex sympathetic dystrophy) (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    History of Present Illness   78 y.o. female with a past medical history of AAA GERD CAD with a pacemaker. Lives at alone asked patient does still his own ADLs.  Patient has no family members except for friends who are basically are power of .  According to friend patient has not been in contact for the past 2 to 3 days and decided to visit patient.  Patient confused combative standing but on a leaning to the right side.  Patient needed to have EMS arrived as patient became combative needed to be bearhug throughout the trip.  Patient here in the ED was aphasic with right-sided weakness patient's blood pressure was 190/84 temperature was 98.3 BUN was 14 glucose 117 hemoglobin was 15.8    CT brain perfusion: 1. Atherosclerosis at the carotid bifurcations with mild luminal narrowing measuring approximately 25% on the right and 20% on the left using NASCET criteria. Patent cervical carotid and vertebral arteries. 2. Termination of flow in an M3 branch of the left middle cerebral artery. 3. Intracranial atherosclerosis with significant narrowing involving the left V4 segment after the takeoff of the posterior inferior cerebellar artery. Mild irregularity of the basilar artery. 4. Completed infarct in the posterior aspect of the left MCA territory. No evidence of ischemic penumbra. rCBF <30% : 0 cc TMax > 6s: 2 cc Mismatch volume: 2 cc Mismatch Ratio: NA 5. Additional chronic and incidental findings, as above.    CT head (6/26): Evolving left MCA territory infarct.  Small hyperdense foci within the infarct may reflect contrast staining from interval angiography versus punctate hemorrhage.  Short interval follow-up  recommended.    CT head (6/27): Acute/subacute left MCA distribution infarct with 3 mm midline shift.. No definite hemorrhage      SLP Pain    Date/Time Pain Type Rating: Rest Rating: Activity [facility]   06/29/23 1046 Pain Assessment 0 - no pain 0 - no pain ASC          Prior Living Environment    Flowsheet Row Most Recent Value   People in Home alone   Current Living Arrangements home   Home Accessibility stairs to enter home (Group)   Living Environment Comment Per patient's friend, Samara, patient lives alone in a 2SH        Prior Level of Function    Flowsheet Row Most Recent Value   Ambulation independent   Transferring independent   Toileting independent   Bathing independent   Dressing independent   Eating independent   IADLs independent   Communication understands/communicates without difficulty   Swallowing swallows foods/liquids without difficulty   Baseline Diet/Method of Nutritional Intake thin liquids, regular   Past History of Dysphagia No known   Prior Level of Function Comment PLOF provided by patient's friend, Samara, due to patient with severe aphasia   Assistive Device Currently Used at Home walker, front-wheeled           SLP Evaluation and Treatment - 06/29/23 1046        SLP Time Calculation    Start Time 1046     Stop Time 1114     Time Calculation (min) 28 min        General Information    Document Type Daily Treatment/Progress Note     Mode of Treatment speech language pathology     Position at Start of Session reclined;in bed     Status at Start of Session agreeable to therapy     General Observations of Patient virtual shivani        Precautions/Limitations/Impairments    Existing Precautions/Restrictions aspiration;fall;modified diet     Limitations/Impairments safety/cognitive;swallowing        Cognition/Psychosocial    Affect/Mental Status anxious     Orientation Status unable/difficult to assess     Follows Commands follows one-step commands;physical/tactile prompts required;verbal  cues/prompting required;25-49% accuracy     Cognitive Function attention deficit     Attention Deficit moderate deficit;focused/sustained attention;other (see comments)   R inattention    Executive Function Deficit moderate deficit;severe deficit;impulse control;information processing;insight/awareness of deficits;self-monitoring/self-correction     Communication Enhancement Strategies call light answered in person        Vocal Quality/Secretion Management (Oral Motor)    Vocal Quality (Oral Motor) WFL     Secretion Management (Oral Motor) WNL        GRBAS    Grade 0-->none     Roughness 0-->none     Breathiness 0-->none     Asthenia 0-->none     Strain 0-->none        Motor Speech    Speech Intelligibility (Motor Speech) word level;phrase/sentence level     Word Level, Speech Intelligibility (Motor Speech) impaired;minimal impairment     Phrase/Sentence Level, Speech Intelligibility (Motor Speech) impaired;minimal impairment     Articulation (Motor Speech) imprecise articulation     Comment, Motor Speech Intervention Verbal and oral apraxia        Auditory Comprehension    Follows Commands (Auditory Comprehension) 1-step command     1 Step, Follows Commands (Auditory Comprehension) 25-49% accuracy;achieved with cues and model     Yes/No Questions (Auditory Comprehension) biographical/personal questions     Biographical/Personal Questions (Auditory Comprehension) 75-90% accuracy        Verbal Expression    Comment, Assessment (Verbal Expression) Non-fluent, anomic; can't imitate; no real words with singing but good melodic line; frustrated     Comment, Intervention (Verbal Expression) Verbal, oral, and ideo-motor apraxia        Functional Communication Measures    FCM: Spoken Language, Comprehension 4-->Level 4     FCM: Spoken Language, Expression 2-->Level 2     FCM: Swallowing 4-->Level 4        General Swallowing Observations    Current Diet/Method of Nutritional Intake mildly thick liquids (MT2);pureed (PU4)      Signs/Symptoms of Aspiration (Current Diet) cough     Respiratory Support (General Swallowing Observations) none        Food and Liquid Trials (NIS)    Patient Positioning HOB elevated (specify degrees)     Oral Intake/Feeding Performance set-up of food/liquid needed     Liquid Consistencies Evaluated thin liquids;mildly thick liquids (MT2)     Thin Liquids patient-controlled amounts;multiple consecutive cup sips;single cup sips;straw sips;sips from cup     Mildly Thick Liquids (MT2) patient-controlled amounts;multiple consecutive cup sips;single cup sips;straw sips;sips from cup     Food Consistencies Evaluated pureed (PU4);soft and bite-sized (SB6)     Pureed (PU4) WFL     Soft and Bite-Sized (SB6) patient controlled amounts     Oral Preparatory Phase of Swallow mild impairment;moderate impairment;mouth closure around utensil/cup decreased;lip seal impaired;bolus cohesion decreased;loss of bolus/oral leakage, right side;mastication, ineffective;oral retention, right lateral sulci;suspect posterior bolus leak     Oral Phase of Swallow mild impairment;moderate impairment;absent anterior-posterior transit;delayed anterior-posterior transit;oral residue, incomplete swallow;suspect posterior bolus leak     Pharyngeal Phase of Swallow delayed swallow reflex initiation;coughing after swallow;uncoordinated swallow     Esophageal Phase of Swallow no clinical symptoms        Swallowing Recommendations    Diet Consistency Recommendations pureed (PU4);soft and bite-sized (SB6)        Swallowing Intervention    Dysphagia/Swallowing Interventions monitor tolerance of;current diet without evidence of aspiration;advanced diet/liquid texture trials;caregiver training;compensatory swallowing strategies        Coping    Observed Emotional State calm;cooperative     Trust Relationship/Rapport care explained;reassurance provided     Family/Support Persons other (see comments)   friend Arnol at bedside       AM-PAC™ - Cognition  (Current Function)    Following/understanding a 10-15 minute speech or presentation? 1 - Unable     Understanding familiar people during ordinary conversations? 2 - A lot     Remembering to take medications at the appropriate time? 1 - Unable     Remembering where things were placed or put away? 2 - A lot     Remembering a list of 3 or 4 errands without writing it down? 1 - Unable     Taking care of complicated tasks? 1 - Unable     AM-PAC™ Cognition Score 8        Auditory Comprehension Goal 1    Progress/Outcome goal ongoing        Session Outcome    Position at End of Session upright;in bed     Status at End of Session bed alarm on;all needs met;call light in reach;personal items in reach     Nursing Notified patient's performance        Plan    Rehab Potential good, to achieve stated therapy goals     Therapy Frequency 5 times/wk     Planned Therapy Interventions dysphagia therapy;instrumental swallow assessment;patient/family education;language therapy               SLP Discharge Recommendations    Flowsheet Row Most Recent Value   SLP Recommended Discharge Disposition acute rehab/Inpatient Rehab Facility at 06/29/2023 1046        Involvement in Care  Family/Support Persons: other (see comments) (friend Arnol at bedside)           SLP Goals    Flowsheet Row Most Recent Value   Communication Goal 1    Communication Goal 1 Pt will imitate sounds and words with max multi-modal cues >50% of the time at 06/28/2023 1138   Time Frame short-term goal (STG), by discharge at 06/28/2023 1138   Progress/Outcome goal ongoing at 06/29/2023 1046   Auditory Comprehension Goal 1    Auditory Comprehension Goal 1 Pt will follow 1 step commands with >60% accy with min cues at 06/28/2023 1138   Time Frame short-term goal (STG), by discharge at 06/28/2023 1138   Progress/Outcome goal ongoing at 06/29/2023 1046   Oral Nutrition/Hydration Goal 1    Activity effective/safe, management of texture/viscosity, with caregiver assist at  06/28/2023 1138   Time Frame short-term goal (STG), by discharge at 06/28/2023 1138   Progress/Outcome goal ongoing at 06/29/2023 1046   Pharyngeal Swallow Goal 1    Activity safely tolerate, thin liquids, pureed (PU4) at 06/27/2023 0940   [place] independently at 06/27/2023 0940   Time Frame by discharge at 06/27/2023 0940   Progress/Outcome goal ongoing at 06/29/2023 1046

## 2023-06-29 NOTE — PLAN OF CARE
Plan of Care Review  Plan of Care Reviewed With: patient  Progress: improving  Outcome Evaluation: NIH 13. PTT was elevated, heparin was paused. Pt refused to allow us to draw blood, but eventually allowed it. Heparin was restarted. No c/o pain. Plan for d/c to [facility] tomorrow.

## 2023-06-29 NOTE — PROGRESS NOTES
Hospital Medicine Service -  Daily Progress Note       SUBJECTIVE     Interval History: No acute events overnight. Awake, still aphasia, Following, Understanding.   Less Right facial droop, R. Hemiparesis improved.   PO diet ordered per SLP's recommendation: Soft + Mild thick.  Hep Drip started yesterday  Monitor: Paced rhythm   IVF  V/S stable.    Going to [facility] after hospital care     OBJECTIVE        Vital signs in last 24 hours:  Temp:  [36.7 °C (98 °F)-37.1 °C (98.8 °F)] 36.9 °C (98.4 °F)  Heart Rate:  [67-86] 67  Resp:  [18] 18  BP: (101-171)/(54-81) 141/67  I/O last 3 completed shifts:  In: 1146.7 [I.V.:1146.7]  Out: -     PHYSICAL EXAMINATION        GEN: well-developed and well-nourished; not in acute distress  HEENT: normocephalic; atraumatic  NECK: no JVD; no bruits  CARDIO: regular rate and rhythm; no murmurs or rubs  RESP: clear to auscultation bilaterally; no rales, rhonchi, or wheezes  ABD: soft, non-distended, non-tender, normal bowel sounds  EXT: no cyanosis, clubbing, or edema  SKIN: clean, dry, warm, and intact  MUSCULOSKELETAL: no injury or deformity  NEURO: alert and Aphasia, unable to answer, following, Right facial droop, Right side Motor 4~5/5, unable to walk.  BEHAVIOR/EMOTIONAL: appropriate; cooperative     LABS / IMAGING / TELE        Labs  Lab Results   Component Value Date    WBC 5.36 06/29/2023    HGB 12.4 06/29/2023    HCT 36.8 06/29/2023    MCV 92.7 06/29/2023     (L) 06/29/2023     Lab Results   Component Value Date    GLUCOSE 104 (H) 06/28/2023    CALCIUM 8.8 06/28/2023     06/28/2023    K 3.5 06/28/2023    CO2 19 (L) 06/28/2023     (H) 06/28/2023    BUN 12 06/28/2023    CREATININE 0.7 06/28/2023     Lab Results   Component Value Date    INR 1.1 06/28/2023    INR 1.1 06/27/2023    INR 1.0 06/26/2023       Imaging  Transthoracic echo (TTE) complete    Addendum Date: 6/27/2023    Left ventricle with normal dimensions and regional contraction abnormalities  consistent with CAD in the LAD distribution: Localized apical infarction with aneurysm formation.  Paradoxical septal movement is consistent with ventricular pacing.  There is moderate left ventricular systolic and mild diastolic dysfunction.  LVEF 43% Left ventricular apical thrombus Left atrial pressure 14 mmHg There is a visual suggestion of mild aortic stenosis. (Doppler assessment of the aortic valve is suboptimal) Mild mitral regurgitation Normal right ventricle dimensions and systolic function: TAPSE 1.84 cm Mild tricuspid regurgitation PASP 34 mmHg Normal left atrial volume index No pericardial effusion or vegetations AS-V pacing Technically difficult study: No parasternal imaging obtained This study was performed with Definity contrast to optimize evaluation of regional and global left ventricular function No significant change from 3/28/2023 except for current demonstration of left ventricular apical thrombus    CT HEAD WITHOUT IV CONTRAST    Result Date: 6/27/2023  IMPRESSION: Acute/subacute left MCA distribution infarct with 3 mm midline shift.. No definite hemorrhage COMMENT: Technique: Computed tomography of the brain was performed utilizing contiguous 2.5 mm transaxial sections without intravenous contrast administration. CT DOSE:  One or more dose reduction techniques (e.g. automated exposure control, adjustment of the mA and/or kV according to patient size, use of iterative reconstruction technique) utilized for this examination. Comparison studies: Head CT dated June 26, 2023. Postsurgical change: None. Brain parenchyma: There is a large area of poor gray-white matter differentiation in the left frontal parietal lobe and subinsular region with 3 mm of midline shift.. No definite hemorrhage. There is intracranial atherosclerotic disease. White matter changes: Normal for age Ventricles, cisterns, and sulci: Normal in size and configuration. Sella and cerebellar tonsils: Normal in appearance.  Calvarium and extra cranial soft tissues: Normal. Paranasal sinuses: Clear bilaterally. Mastoid air cell: Normal Orbits: Normal     CT HEAD WITHOUT IV CONTRAST    Result Date: 6/26/2023  IMPRESSION: Evolving left MCA territory infarct.  Small hyperdense foci within the infarct may reflect contrast staining from interval angiography versus punctate hemorrhage.  Short interval follow-up recommended.    CT HEAD STROKE ALERT WITHOUT IV CONTRAST    Result Date: 6/26/2023  IMPRESSION: 1. Findings highly concerning for an acute infarct in the posterior aspect of the left MCA territory with an associated thrombosed vessel. 2. Chronic microangiopathy and involutional changes. Finding:    Stroke alert   Acuity: Critical  Status:  CLOSED Critical read back was performed and results were read back by ARIAN Washington on 6/26/2023 at 3:39 PM.     CT ANGIOGRAPHY HEAD/NECK WITH AND WITHOUT IV CONTRAST    Result Date: 6/26/2023  IMPRESSION: 1. Atherosclerosis at the carotid bifurcations with mild luminal narrowing measuring approximately 25% on the right and 20% on the left using NASCET criteria. Patent cervical carotid and vertebral arteries. 2. Termination of flow in an M3 branch of the left middle cerebral artery. 3. Intracranial atherosclerosis with significant narrowing involving the left V4 segment after the takeoff of the posterior inferior cerebellar artery. Mild irregularity of the basilar artery. 4. Completed infarct in the posterior aspect of the left MCA territory. No evidence of ischemic penumbra. rCBF <30% : 0 cc TMax > 6s: 2 cc Mismatch volume: 2 cc Mismatch Ratio: NA 5. Additional chronic and incidental findings, as above. In order to accelerate response time, other vascular pathology including occlusions of more distal arteries are not completely evaluated on this examination. Finding:    Stroke alert   Acuity: Critical  Status:  CLOSED Critical read back was performed and results were read back by Dr. Jordan, on  6/26/2023 at 4:03 PM. Critical read back was also performed by Dr. Aparicio and the results were critically read back at 4:07 PM.     CT BRAIN PERFUSION WITH IV CONTRAST    Result Date: 6/26/2023  IMPRESSION: 1. Atherosclerosis at the carotid bifurcations with mild luminal narrowing measuring approximately 25% on the right and 20% on the left using NASCET criteria. Patent cervical carotid and vertebral arteries. 2. Termination of flow in an M3 branch of the left middle cerebral artery. 3. Intracranial atherosclerosis with significant narrowing involving the left V4 segment after the takeoff of the posterior inferior cerebellar artery. Mild irregularity of the basilar artery. 4. Completed infarct in the posterior aspect of the left MCA territory. No evidence of ischemic penumbra. rCBF <30% : 0 cc TMax > 6s: 2 cc Mismatch volume: 2 cc Mismatch Ratio: NA 5. Additional chronic and incidental findings, as above. In order to accelerate response time, other vascular pathology including occlusions of more distal arteries are not completely evaluated on this examination. Finding:    Stroke alert   Acuity: Critical  Status:  CLOSED Critical read back was performed and results were read back by Dr. Jordan, on 6/26/2023 at 4:03 PM. Critical read back was also performed by Dr. Aparicio and the results were critically read back at 4:07 PM.     X-RAY CHEST 1 VIEW    Result Date: 6/26/2023  IMPRESSION: No active disease in the chest.       ECG/Telemetry  I have independently reviewed the telemetry. No events for the last 24 hours.    ASSESSMENT AND PLAN      Left ventricular apical thrombus  Assessment & Plan  Per TTE report on 6/19  Waiting for Dr. pAaricio's recommendation: AC    6/21  Heparin drip started on 6/20    Elevated blood pressure reading with diagnosis of hypertension  Assessment & Plan  hold home meds  Fall precautions  Aspiration precautions  Consult pt/ot  Consult case mgt  bp parameters for hypotension  dvt  prophylaxis with  scd  Pt is full code  Spent 55 minutes assessing planning for patient care    6/27~  Stable today     Cardiac pacemaker  Assessment & Plan  Normal Function on monitor    Ischemic cardiomyopathy  Assessment & Plan  Monitor on telemetry  N.p.o. except medication  Trend troponin  Repeat ECG  Received 325 mg ASA, will continue 81 mg daily  Dr. Lorenzo's consult appreciated.  TTE: EF 43%, Left ventricular apical thrombus  On daily ASA 81 mg  Hep Drip to NOAC     * Cerebrovascular accident (CVA), unspecified mechanism (CMS/HCC)  Assessment & Plan  Brain CT x3: Left MCA territory Ischemia. No Hemorrhage    Monitor on PCU  Aphasic and right-sided weakness  Neurochecks/NIH  No MRI brain, MRA head/neck ordered Due to PPM  Obtain ECHO  Trend troponin  Stabilizing BP  Check Lipid panel, A1c  Received 325 mg ASA, will continue 81 mg daily  Continue home regimen   IV Hydralazine SBP > 180  Neurology consult Dr. Aparicio saw her , appreciated.    6/28  Neuro Status stable.  SLP recommended Puree Diet with mild Thick  On Daily ASA 81 mg  TTE reported Left ventricular apical thrombus   D/w Dr. Aparicio regarding the AC Tx     PT/OT evaluation  PM&R for [facility]    6/21  The hep Drip started after d/w Dr. Aparicio.  So far tolerated well.  May changing to oral AC when OK with Neurology service  To [facility] after hospital care    Encephalopathy 2/2 CVA       VTE Assessment: Padua    Code Status: Full Code  Estimated discharge date: 7/3/2023     Richard Pandya MD  6/29/2023  11:21 AM         Encephalopathy 2/2 CVA

## 2023-06-29 NOTE — PROGRESS NOTES
Patient: Jennifer Sams  Location: [facility] 3A 3031  MRN:  [id number]  Today's date:  6/29/2023    Attempted to see patient for therapy. Unable due to medical hold (left ventricular apicla thrombus found on TTE, heparin IV ordered 6/28 at 1445. PT deferred for 24 hours to allow appropriate anticoagulation prior to further mobilization w/ therapy. Will continue to follow.).

## 2023-06-29 NOTE — PLAN OF CARE
Plan of Care Review  Plan of Care Reviewed With: patient  Progress: improving  Outcome Evaluation: Patient alert, NIH-13 and unchanged from previous shift. VSS, PRN medication given per BP parameters. Heparin gtt running per orders, PTT ordered and drawn per protocol. Virtual shivani in place, bed alarm on and call bell within reach.

## 2023-06-30 LAB
APTT PPP: 113 SEC (ref 23–35)
APTT PPP: 115 SEC (ref 23–35)
ERYTHROCYTE [DISTWIDTH] IN BLOOD BY AUTOMATED COUNT: 13 % (ref 11.7–14.4)
HCT VFR BLDCO AUTO: 34.3 % (ref 35–45)
HGB BLD-MCNC: 11.3 G/DL (ref 11.8–15.7)
MCH RBC QN AUTO: 30.8 PG (ref 28–33.2)
MCHC RBC AUTO-ENTMCNC: 32.9 G/DL (ref 32.2–35.5)
MCV RBC AUTO: 93.5 FL (ref 83–98)
PDW BLD AUTO: 9.2 FL (ref 9.4–12.3)
PLATELET # BLD AUTO: 127 K/UL (ref 150–369)
RBC # BLD AUTO: 3.67 M/UL (ref 3.93–5.22)
WBC # BLD AUTO: 4.88 K/UL (ref 3.8–10.5)

## 2023-06-30 PROCEDURE — 36415 COLL VENOUS BLD VENIPUNCTURE: CPT | Performed by: HOSPITALIST

## 2023-06-30 PROCEDURE — 99232 SBSQ HOSP IP/OBS MODERATE 35: CPT | Performed by: INTERNAL MEDICINE

## 2023-06-30 PROCEDURE — 85730 THROMBOPLASTIN TIME PARTIAL: CPT | Performed by: HOSPITALIST

## 2023-06-30 PROCEDURE — 99239 HOSP IP/OBS DSCHRG MGMT >30: CPT | Performed by: HOSPITALIST

## 2023-06-30 PROCEDURE — 97530 THERAPEUTIC ACTIVITIES: CPT | Mod: GP,CQ

## 2023-06-30 PROCEDURE — 25800000 HC PHARMACY IV SOLUTIONS: Performed by: HOSPITALIST

## 2023-06-30 PROCEDURE — 20600000 HC ROOM AND CARE INTERMEDIATE/TELEMETRY

## 2023-06-30 PROCEDURE — 63600000 HC DRUGS/DETAIL CODE: Mod: JZ | Performed by: HOSPITALIST

## 2023-06-30 PROCEDURE — 63700000 HC SELF-ADMINISTRABLE DRUG: Performed by: HOSPITALIST

## 2023-06-30 PROCEDURE — 85027 COMPLETE CBC AUTOMATED: CPT | Performed by: HOSPITALIST

## 2023-06-30 PROCEDURE — 63700000 HC SELF-ADMINISTRABLE DRUG: Performed by: NURSE PRACTITIONER

## 2023-06-30 PROCEDURE — 97535 SELF CARE MNGMENT TRAINING: CPT | Mod: GO

## 2023-06-30 RX ORDER — ACETAMINOPHEN 325 MG/1
650 TABLET ORAL EVERY 6 HOURS PRN
Start: 2023-06-30 | End: 2023-07-14 | Stop reason: HOSPADM

## 2023-06-30 RX ORDER — SENNOSIDES 8.6 MG/1
1 TABLET ORAL 2 TIMES DAILY PRN
Start: 2023-06-30 | End: 2023-07-14 | Stop reason: HOSPADM

## 2023-06-30 RX ADMIN — APIXABAN 5 MG: 5 TABLET, FILM COATED ORAL at 17:59

## 2023-06-30 RX ADMIN — ASPIRIN 81 MG: 81 TABLET, COATED ORAL at 09:11

## 2023-06-30 RX ADMIN — SODIUM CHLORIDE: 9 INJECTION, SOLUTION INTRAVENOUS at 09:28

## 2023-06-30 RX ADMIN — PANTOPRAZOLE SODIUM 40 MG: 40 TABLET, DELAYED RELEASE ORAL at 09:11

## 2023-06-30 RX ADMIN — HEPARIN SODIUM AND DEXTROSE 700 UNITS/HR: 10000; 5 INJECTION INTRAVENOUS at 04:03

## 2023-06-30 RX ADMIN — ROSUVASTATIN CALCIUM 20 MG: 20 TABLET, FILM COATED ORAL at 20:02

## 2023-06-30 RX ADMIN — CYCLOSPORINE 1 DROP: 0.5 EMULSION OPHTHALMIC at 20:02

## 2023-06-30 ASSESSMENT — COGNITIVE AND FUNCTIONAL STATUS - GENERAL
MOVING TO AND FROM BED TO CHAIR: 3 - A LITTLE
WALKING IN HOSPITAL ROOM: 3 - A LITTLE
CLIMB 3 TO 5 STEPS WITH RAILING: 3 - A LITTLE
STANDING UP FROM CHAIR USING ARMS: 3 - A LITTLE
TOILETING: 3 - A LITTLE
STANDING UP FROM CHAIR USING ARMS: 3 - A LITTLE
EATING MEALS: 3 - A LITTLE
STANDING UP FROM CHAIR USING ARMS: 3 - A LITTLE
DRESSING REGULAR UPPER BODY CLOTHING: 3 - A LITTLE
WALKING IN HOSPITAL ROOM: 3 - A LITTLE
HELP NEEDED FOR BATHING: 2 - A LOT
WALKING IN HOSPITAL ROOM: 3 - A LITTLE
MOVING TO AND FROM BED TO CHAIR: 3 - A LITTLE
CLIMB 3 TO 5 STEPS WITH RAILING: 3 - A LITTLE
AFFECT: ANXIOUS
DRESSING REGULAR LOWER BODY CLOTHING: 2 - A LOT
CLIMB 3 TO 5 STEPS WITH RAILING: 2 - A LOT
HELP NEEDED FOR PERSONAL GROOMING: 2 - A LOT
MOVING TO AND FROM BED TO CHAIR: 3 - A LITTLE

## 2023-06-30 NOTE — PROGRESS NOTES
Physical Therapy -  Daily Treatment/Progress Note     Patient: Jennifer Sams  Location: [address]  MRN: [id number]  Today's date: 6/30/2023    HISTORY OF PRESENT ILLNESS     Jennifer is a 78 y.o. female admitted on 6/26/2023 with NSTEMI (non-ST elevated myocardial infarction) (CMS/[facility]) [I21.4]  Coronary artery disease involving native coronary artery of native heart with angina pectoris (CMS/HCC) [I25.119]  Cerebrovascular accident (CVA), unspecified mechanism (CMS/HCC) [I63.9]. Principal problem is Cerebrovascular accident (CVA), unspecified mechanism (CMS/HCC).    Past Medical History  Jennifer has a past medical history of AAA (abdominal aortic aneurysm), Arthritis, Elevated blood pressure reading without diagnosis of hypertension (4/19/2019), Epistaxis (1/6/2020), GERD (gastroesophageal reflux disease) (4/19/2019), Glaucoma (4/19/2019), Heart murmur, Hiatal hernia, History of hip replacement, total, right (4/19/2019), History of rheumatic fever as a child (4/19/2019), Hypercholesterolemia (4/19/2019), Ischemic cardiomyopathy (5/9/2019), Kidney cysts, Osteoarthritis of multiple joints (4/19/2019), Osteoporosis, Pacemaker (12/9/2019), Raynaud's disease (4/19/2019), RSD (reflex sympathetic dystrophy) (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    History of Present Illness   78 y.o. female with a past medical history of AAA GERD CAD with a pacemaker. Lives at alone asked patient does still his own ADLs.  Patient has no family members except for friends who are basically are power of .  According to friend patient has not been in contact for the past 2 to 3 days and decided to visit patient.  Patient confused combative standing but on a leaning to the right side.  Patient needed to have EMS arrived as patient became combative needed to be bearhug throughout the trip.  Patient here in the ED was aphasic  with right-sided weakness patient's blood pressure was 190/84 temperature was 98.3 BUN was 14 glucose 117 hemoglobin was 15.8    CT brain perfusion: 1. Atherosclerosis at the carotid bifurcations with mild luminal narrowing measuring approximately 25% on the right and 20% on the left using NASCET criteria. Patent cervical carotid and vertebral arteries. 2. Termination of flow in an M3 branch of the left middle cerebral artery. 3. Intracranial atherosclerosis with significant narrowing involving the left V4 segment after the takeoff of the posterior inferior cerebellar artery. Mild irregularity of the basilar artery. 4. Completed infarct in the posterior aspect of the left MCA territory. No evidence of ischemic penumbra. rCBF <30% : 0 cc TMax > 6s: 2 cc Mismatch volume: 2 cc Mismatch Ratio: NA 5. Additional chronic and incidental findings, as above.    CT head (6/26): Evolving left MCA territory infarct.  Small hyperdense foci within the infarct may reflect contrast staining from interval angiography versus punctate hemorrhage.  Short interval follow-up recommended.    CT head (6/27): Acute/subacute left MCA distribution infarct with 3 mm midline shift.. No definite hemorrhage    PRIOR LEVEL OF FUNCTION AND LIVING ENVIRONMENT     Prior Level of Function    Flowsheet Row Most Recent Value   Ambulation independent   Transferring independent   Toileting independent   Bathing independent   Dressing independent   Eating independent   IADLs independent   Communication understands/communicates without difficulty   Swallowing swallows foods/liquids without difficulty   Baseline Diet/Method of Nutritional Intake thin liquids, regular   Past History of Dysphagia No known   Prior Level of Function Comment PLOF provided by patient's friend, Samara, due to patient with severe aphasia   Assistive Device Currently Used at Home walker, front-wheeled        Prior Living Environment    Flowsheet Row Most Recent Value   People in Home  alone   Current Living Arrangements home   Home Accessibility stairs to enter home (Group)   Living Environment Comment Per patient's friend, Samara, patient lives alone in a 2SH        VITALS AND PAIN     PT Vitals    Date/Time Pulse SpO2 Pt Activity O2 Therapy BP BP Location BP Method Pt Position [facility]   06/30/23 0900 60 -- -- -- 101/50 112/53 -- Automatic -- YUDELKA   06/30/23 0903 60 93 % At rest None (Room air) 101/50 Left upper arm Automatic Sitting KK   06/30/23 0921 56 -- -- -- 112/53 Left upper arm Automatic Lying KK      PT Pain    Date/Time Pain Type Rating: Rest Rating: Activity [facility]   06/30/23 0900 Pain Assessment 0 0 YUDELKA   06/30/23 0903 Pain Assessment 0 0 KK        Objective   OBJECTIVE     Start time:  0900  End time:  0924  Session Length: 24 min  Mode of Treatment: co-treatment, physical therapy (For Auth)    General Observations  Patient received in bed, supine. She was agreeable to therapy, no issues or concerns identified by nurse prior to session.      Precautions: aspiration, fall, modified diet       Limitations/Impairments: safety/cognitive, swallowing      PT Eval and Treat - 06/30/23 0900        Cognition    Orientation Status other (see comments)   Aphasic not able to speak at this point in the day    Follows Commands follows one-step commands   inconsistant can be redirected       Bed Mobility    Bed Mobility Activities left     Basile minimum assist (75% or more patient effort);1 person assist     Safety/Cues maximal;impulsivity     Assistive Device head of bed elevated   30'    Comment sitting up long sitting in bed but can be redirected.        Mobility Belt    Mobility Belt Used for All Out of Bed Activity no     Reason Mobility Belt Not Used patient refused        Sit/Stand Transfer    Surface edge of bed     Basile minimum assist (75% or more patient effort)     Safety/Cues moderate     Transfer Comments standing then advancing gait when asked to stay standing still.  redirected with 2 more attempts to follow cueing.        Gait Training    [place]askell, Gait minimum assist (75% or more patient effort);1 person assist     Distance in Feet 44 feet     Pattern step-through     Comment (Gait/Stairs) Impulsive, Latteral sway min A for support and balance.        Stairs Training    Safety/Cues moderate        Balance    Static Sitting Balance moderate impairment     Dynamic Sitting Balance moderate impairment     Sit to Stand Dynamic Balance moderate impairment     Static Standing Balance moderate impairment     Dynamic Standing Balance moderate impairment     Comment, Balance Implsive        Impairments/Safety Issues    Impairments Affecting Function balance;cognition;coordination;strength;postural/trunk control     Cognitive Impairments, Safety/Performance attention                               Education Documentation  Signs/Symptoms, taught by Juna Miranda PTA at 6/30/2023  9:36 AM.  Learner: Patient  Readiness: Acceptance  Method: Explanation  Response: Verbalizes Understanding  Comment: Transfers GAit, following 1 step cueing        Session Outcome  Patient in bed, reclined (HOB 30') at end of session, call light in reach, personal items in reach, bed alarm on. Nursing notified about patient's response to therapy/activity, patient's performance, and patient's position.    AM-PAC™ - Mobility (Current Function)     Turning form your back to your side while in flat bed without using bedrails 3 - A Little   Moving from lying on your back to sitting on the side of a flat bed without using bedrails 3 - A Little   Moving to and from a bed to a chair 3 - A Little   Standing up from a chair using your arms 3 - A Little   To walk in a hospital room 3 - A Little   Climbing 3-5 steps with a railing 3 - A Little   AM-PAC™ Mobility Score 18      ASSESSMENT AND PLAN     Progress Summary  [facility] 18 Pt. impulsive safety and balance issues Mod amount of recueing to redirect. Min A all mobiity.  Will benefit from cont, PT    Patient/Family Therapy Goals Statement: none stated    PT Plan    Flowsheet Row Most Recent Value   Rehab Potential good, to achieve stated therapy goals at 06/30/2023 0900   Therapy Frequency 5 times/wk at 06/30/2023 0900   Planned Therapy Interventions bed mobility training, gait training, ROM (range of motion), stair training, strengthening, postural re-education, balance training at 06/30/2023 0900          PT Discharge Recommendations    Flowsheet Row Most Recent Value   PT Recommended Discharge Disposition acute rehab/Inpatient Rehab Facility at 06/28/2023 1514   Anticipated Equipment Needs at Discharge (PT) none  [TBD] at 06/28/2023 1514               PT Goals    Flowsheet Row Most Recent Value   Bed Mobility Goal 1    Activity/Assistive Device bed mobility activities, all at 06/27/2023 1405   [place] modified independence at 06/27/2023 1405   Time Frame by discharge at 06/27/2023 1405   Progress/Outcome goal ongoing at 06/27/2023 1405   Transfer Goal 1    Activity/Assistive Device all transfers at 06/27/2023 1405   [place] modified independence at 06/27/2023 1405   Time Frame by discharge at 06/27/2023 1405   Progress/Outcome goal ongoing at 06/27/2023 1405   Gait Training Goal 1    Activity/Assistive Device gait (walking locomotion) at 06/27/2023 1405   [place] modified independence at 06/27/2023 1405   Distance 125 at 06/27/2023 1405   Time Frame by discharge at 06/27/2023 1405   Progress/Outcome goal ongoing at 06/27/2023 1405   Stairs Goal 1    Activity/Assistive Device stairs, all skills at 06/27/2023 1405   [place] modified independence at 06/27/2023 1405   Number of Stairs 10 at 06/27/2023 1405   Time Frame by discharge at 06/27/2023 1405   Progress/Outcome goal ongoing at 06/27/2023 1405

## 2023-06-30 NOTE — HOSPITAL COURSE
78 y.o. female, I PTA with PMH of AAA, glaucoma, hyperlipidemia, CAD admitted 6/26 when found to be confused and combative. Upon presentation to the ED she was aphasic with right-sided weakness.  She was hypertensive.  CT of the head showed an acute infarct in the left MCA region with mild bleeding. TTE showed an EF of 43%. Bubble study was negative. 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     Cerebrovascular accident (CVA), unspecified mechanism (CMS/[facility])  Assessment & Plan  Patient with acute CVA in Left MCA territory.  There was a concern for possible bleed on initial CT scan but repeat with no hemorrhage but 3mm midline shift     -Management per Neurology   -No evidence of atrial fibrillation on telemetry, monitor  -ECHO 6/27/23: LVEF 43%, localized apical aneurysm containing thrombus, possible mild AS, mild MR, normal RV, PASP 34.   -Discontinue heparin infusion.  -Start eliquis 5 mg BID.  -Discontinue aspirin.  -Continue statin therapy.     Cardiac pacemaker  Assessment & Plan  Hx of heart block. S/p medtronic pacemaker.   V paced on telemetry   Follows with Dr. Carolina      No changes to management      Coronary artery disease involving native coronary artery of native heart with angina pectoris (CMS/[facility])  Assessment & Plan  non-STEMI 4/26/19, three-vessel CAD; status post FRANK x 2 LAD 4/27/19 + FRANK x 4 RCA 4/29/19 + FRANK x 1 LCX OM2 5/2/19     Continue ASA and Statin.  Plavix was discontinued due to epistaxis.      Ischemic cardiomyopathy  Assessment & Plan  Echo 6/27/23 stable EF 45-50%, follows with Dr. Salter. In past recommended Entresto ( was cost prohibitive) and then Valsartan but not started due to concerns of dizziness.       Once outside the permissive hypertensive window and NPO status, would start Valsartan     Pt is AA&O to person, knows she's in hospital. Difficult to assess d/t exp and rec aphasia. Pt uses gestures, head nods for yes/no. Per SLP for bio/personal  questions, pt 75-90% accurate. 1 step commands 25-49% accuracy. Will require virtual shivani monitoring. Tolerating pureed with mildly thick liquids diet. Heparin drip stopped, starting Eliquis today. Call in to friend Samara regarding dispo, await call back. Participating in therapies with rec's for acute rehab- see note below.        Seen by PM&R:  ADL and ambulatory dysfunction speech and swallow dysfunction status post left MCA CVA with right hemiparesis, aphasia and dysphagia.  Apparently was completely independent prior to this event.  Given her current functional deficits and high degree of medical complexity recommendation once stable is referral to acute level inpatient rehabilitation for comprehensive rehab program with 3 hours daily physical, occupational and speech therapies with medical management and oversight by physiatry.  Goals are to work on improvement in functional mobility, neuromuscular reeducation, assess ADLs and need for adaptive equipment, assess speech and swallow function and upgrade diet, caregiver training and education and establishing a safe discharge plan.  Her estimated length of stay is 21 days.  Case management assisting with discharge planning.

## 2023-06-30 NOTE — BMR PREADMISSION NOTE
[facility]  Preadmission Assessment    Patient Name:  Jennifer Sams  YOB: 1945    Referral Date:  06/27/23  Evaluation Date:  06/30/23  Referring Facility Admission Date: 06/26/23  Referring Facility: [facility]   Referring Provider:  Dr. Pandya    Reason for Referral: Jennifer Sams is a 78 y.o. female whose primary indication for inpatient rehabilitation is Stroke.     Pertinent History of Current Functional Problem:  78 y.o. female, I PTA with PMH of AAA, glaucoma, hyperlipidemia, CAD admitted 6/26 when found to be confused and combative. Upon presentation to the ED she was aphasic with right-sided weakness.  She was hypertensive.  CT of the head showed an acute infarct in the left MCA region with mild bleeding. TTE showed an EF of 43%. Bubble study was negative. 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     Cerebrovascular accident (CVA), unspecified mechanism (CMS/[facility])  Assessment & Plan  Patient with acute CVA in Left MCA territory.  There was a concern for possible bleed on initial CT scan but repeat with no hemorrhage but 3mm midline shift     -Management per Neurology   -No evidence of atrial fibrillation on telemetry, monitor  -ECHO 6/27/23: LVEF 43%, localized apical aneurysm containing thrombus, possible mild AS, mild MR, normal RV, PASP 34.   -Discontinue heparin infusion.  -Start eliquis 5 mg BID.  -Discontinue aspirin.  -Continue statin therapy.     Cardiac pacemaker  Assessment & Plan  Hx of heart block. S/p medtronic pacemaker.   V paced on telemetry   Follows with Dr. Carolina      No changes to management      Coronary artery disease involving native coronary artery of native heart with angina pectoris (CMS/[facility])  Assessment & Plan  non-STEMI 4/26/19, three-vessel CAD; status post FRANK x 2 LAD 4/27/19 + FRANK x 4 RCA 4/29/19 + FRANK x 1 LCX OM2 5/2/19     Continue ASA and Statin.  Plavix was discontinued due to epistaxis.      Ischemic  cardiomyopathy  Assessment & Plan  Echo 6/27/23 stable EF 45-50%, follows with Dr. Salter. In past recommended Entresto ( was cost prohibitive) and then Valsartan but not started due to concerns of dizziness.       Once outside the permissive hypertensive window and NPO status, would start Valsartan     Pt is AA&O to person, knows she's in hospital. Difficult to assess d/t exp and rec aphasia. Pt uses gestures, head nods for yes/no. Per SLP for bio/personal questions, pt 75-90% accurate. 1 step commands 25-49% accuracy. Will require virtual shivani monitoring. Tolerating pureed with mildly thick liquids diet. Heparin drip stopped, starting Eliquis today. Call in to friend Samara regarding dispo, await call back. Participating in therapies with rec's for acute rehab- see note below.        Seen by PM&R:  ADL and ambulatory dysfunction speech and swallow dysfunction status post left MCA CVA with right hemiparesis, aphasia and dysphagia.  Apparently was completely independent prior to this event.  Given her current functional deficits and high degree of medical complexity recommendation once stable is referral to acute level inpatient rehabilitation for comprehensive rehab program with 3 hours daily physical, occupational and speech therapies with medical management and oversight by physiatry.  Goals are to work on improvement in functional mobility, neuromuscular reeducation, assess ADLs and need for adaptive equipment, assess speech and swallow function and upgrade diet, caregiver training and education and establishing a safe discharge plan.  Her estimated length of stay is 21 days.  Case management assisting with discharge planning.      Current DVT Prophylaxis:  Risk for DVT is high.  Current DVT Prophylaxis: apixaban (Eliquis)  Current DVT Prophylaxis Dose/Frequency/Route: Eliquis 5mg PO BID    Active Medical Conditions:  Patient Active Problem List   Diagnosis   • Hypercholesterolemia   • Pseudoclaudication  syndrome   • History of rheumatic fever as a child   • Glaucoma   • RSD (reflex sympathetic dystrophy)   • Raynaud's disease   • Osteoarthritis of multiple joints   • History of hip replacement, total, right   • GERD (gastroesophageal reflux disease)   • Elevated blood pressure reading without diagnosis of hypertension   • Abdominal aortic aneurysm (AAA) without rupture (CMS/[facility])   • NSTEMI (non-ST elevated myocardial infarction) (CMS/[facility])   • Ischemic cardiomyopathy   • Status post insertion of drug eluting coronary artery stent   • Chest pain   • Coronary artery disease involving native coronary artery of native heart with angina pectoris (CMS/[facility])   • Mobitz type 2 second degree atrioventricular block   • Cardiac pacemaker   • SOB (shortness of breath)   • Epistaxis   • Dyspnea on exertion   • Chest pain on breathing   • Seasonal allergies   • Raynaud's phenomenon   • Chronic combined systolic and diastolic heart failure (CMS/[facility])   • Other headache syndrome   • Coronary artery disease involving native heart without angina pectoris   • Mixed hyperlipidemia   • Dizziness   • Lumbar degenerative disc disease   • Lumbar facet arthropathy   • Cerebrovascular accident (CVA), unspecified mechanism (CMS/[facility])   • Elevated blood pressure reading with diagnosis of hypertension   • Left ventricular apical thrombus       Active Medications:  Facility-Administered Medications Ordered in Other Visits   Medication Dose Route Frequency Provider Last Rate Last Admin   • acetaminophen (TYLENOL) tablet 650 mg  650 mg oral q6h PRN Kelby Martinez MD       • apixaban (ELIQUIS) tablet 5 mg  5 mg oral BID Juliette Bonilla CRNP       • cycloSPORINE (RESTASIS) 0.05 % ophthalmic emulsion 1 drop  1 drop Both Eyes q12h [facility] Richard Pandya MD   1 drop at 06/29/23 2046   • glucose chewable tablet 16-32 g of dextrose  16-32 g of dextrose oral PRN Kelby Martinez MD        Or   • dextrose 40 % oral gel 15-30 g of dextrose  15-30 g of  dextrose oral PRN Kelby Martinez MD        Or   • glucagon (GLUCAGEN) injection 1 mg  1 mg intramuscular PRN Kelby Martinez MD        Or   • dextrose 50 % in water (D50) injection 12.5 g  25 mL intravenous PRN Kelby Martinez MD       • hydrALAZINE (APRESOLINE) injection 10 mg  10 mg intravenous q4h PRN Kelby Martinez MD   10 mg at 06/28/23 2249   • LORazepam (ATIVAN) injection 1 mg  1 mg intravenous Once in imaging Kelby Martinez MD       • magnesium sulfate IVPB 2g in 50 mL NSS/D5W/SWFI  2 g intravenous PRN Kelby Martinez MD       • ondansetron (ZOFRAN) injection 4 mg  4 mg intravenous q8h PRN Kelby Martinez MD       • pantoprazole (PROTONIX) tablet,delayed release (DR/EC) 40 mg  40 mg oral Daily Richard Pandya MD   40 mg at 06/30/23 0911   • potassium chloride (KLOR-CON M) ER tablet (particles/crystals) 20 mEq  20 mEq oral PRN Kelby Martinez MD       • potassium chloride (KLOR-CON M) ER tablet (particles/crystals) 40 mEq  40 mEq oral PRN Kelby Martinez MD       • potassium chloride 20 mEq in 100 mL IVPB  (premix)  20 mEq intravenous PRN Kelby Martinez MD       • potassium chloride 20 mEq in 100 mL IVPB  (premix)  20 mEq intravenous PRN Kelby Martinez MD        And   • potassium chloride 20 mEq in 100 mL IVPB  (premix)  20 mEq intravenous PRN Kelby Martinez MD       • rosuvastatin (CRESTOR) tablet 20 mg  20 mg oral Nightly Richard Pandya MD   20 mg at 06/29/23 2046   • senna (SENOKOT) tablet 1 tablet  1 tablet oral 2x daily PRN Kelby Martinez MD       • sodium chloride 0.9 % infusion   intravenous Continuous Kelby Martinez MD 80 mL/hr at 06/30/23 0928 New Bag at 06/30/23 0928   No medication comments found.    HISTORY:    Past Medical History:   Diagnosis Date   • AAA (abdominal aortic aneurysm)    • Arthritis    • Elevated blood pressure reading without diagnosis of hypertension 4/19/2019   • Epistaxis 1/6/2020   • GERD (gastroesophageal reflux disease) 4/19/2019   • Glaucoma  4/19/2019   • Heart murmur    • Hiatal hernia    • History of hip replacement, total, right 4/19/2019    Right hip 9/ 2016 and revision 2017    • History of rheumatic fever as a child 4/19/2019   • Hypercholesterolemia 4/19/2019   • Ischemic cardiomyopathy 5/9/2019   • Kidney cysts    • Osteoarthritis of multiple joints 4/19/2019   • Osteoporosis    • Pacemaker 12/9/2019   • Raynaud's disease 4/19/2019   • RSD (reflex sympathetic dystrophy) 4/19/2019    Of left foot   • SOB (shortness of breath) 12/10/2019   • Status post insertion of drug eluting coronary artery stent 5/9/2019     Past Surgical History:   Procedure Laterality Date   • CHOLECYSTECTOMY OPEN     • CORONARY ANGIOPLASTY WITH STENT PLACEMENT  04/29/2019    of LAD   • CORONARY ANGIOPLASTY WITH STENT PLACEMENT  04/30/2019    of RCA   • DILATION AND CURETTAGE OF UTERUS     • EYE SURGERY      RIGHT CATARACT   • FOOT SURGERY Left    • JOINT REPLACEMENT Right 09/2016    total hip   • REVISION TOTAL HIP ARTHROPLASTY Right 2017   • TONSILLECTOMY       Tobacco Use as of 6/27/2023     Smoking Status Smoking Start Date Quit Date Smoking Frequency    Never -- --     Smokeless Status Smokeless Type Smokeless Quit Date    Never -- --    Source    --            Alcohol Use as of 6/27/2023     Alcohol Use Drinks/Week Alcohol/Week Comments Source    Not Currently   -- -- Provider            Drug Use as of 6/27/2023     Drug Use Types Frequency Comments Source    No -- -- -- Provider            Sexual Activity as of 6/27/2023     Sexually Active Birth Control Partners Comments Source    Defer -- -- -- Provider            Socioeconomic as of 6/27/2023     Marital Status Spouse Name Number of Children Years Education Education Level Preferred Language Ethnicity Race Source    Single -- -- -- -- English Not , /a, or [place] origin White Provider        Allergies  Allergies   Allergen Reactions   • Ace Inhibitors Other (see comments)     cough   • Atorvastatin       Nausea and can'trecall   • Brilinta [Ticagrelor]      SOB   • Codeine      Nausea and Vomiting   • Dilaudid [Hydromorphone]      Nausea & vomiting   • Morphine Sulfate Nausea Only and GI intolerance   • Onion      Severe abdominal pain   • Oxycodone      Nausea & vomiting, dizziness       Prior Level of Function    Flowsheet Row Most Recent Value   Ambulation independent   Transferring independent   Toileting independent   Bathing independent   Dressing independent   Eating independent   IADLs independent   Communication understands/communicates without difficulty   Swallowing swallows foods/liquids without difficulty   Baseline Diet/Method of Nutritional Intake no diet restrictions   Past History of Dysphagia No   Prior Level of Function Comment I PTA   Assistive Device Currently Used at Home walker, front-wheeled        Prior Living Environment    Flowsheet Row Most Recent Value   People in Home alone   Current Living Arrangements home   Living Environment Comment 2 SH, 1 DARÍO          TEST RESULTS:  Chemistry (Up to last 3 results from the past 720 hours)      06/26 1524 06/27 0624 06/28 0606    Sodium       142            141            142         Potassium       4.1  Comment: Results obtained on plasma. Plasma Potassium values may be up to 0.4 mEQ/L less than serum values. The differences may be greater for patients with high platelet or white cell counts.            3.4            3.5         BUN       14            13            12         Creatinine       0.8            0.8            0.7         Glucose       117            107            104         CO2       21            23            19         Chloride       111            111            112           Hepatic (Up to last 3 results from the past 720 hours)      06/26 1524    ALT (SGPT)       16         AST (SGOT)       28         Alkaline Phosphatase       68         Bilirubin, Total       1.6           Metabolic (Up to last 3 results from the past  720 hours)      06/26 1524    Hemoglobin A1C       5.4         Cholesterol       178         Triglycerides       93         HDL       71  Comment: 2001 NCEP GUIDELINES<40 mg/dL Low>=60 mg/dL High         LDL Calculated       88  Comment: ATP III GUIDELINESOptimal: <100 mg/dLNear/Above Optimal: 100-129 mg/dLBorderline High: 130-159 mg/dLHigh: 160-189 mg/dLVery High: >190 mg/dL           Hematologic (Up to last 3 results from the past 720 hours)      06/28 1748 06/29 0626 06/30 0235    WBC       --            5.36            4.88         Hemoglobin       --            12.4            11.3         Hematocrit       --            36.8            34.3         Platelets       --            142            127         Protime       14.5            --            --         INR       1.1  Comment: INR has no defined significance when PT is within Reference Range.            --            --           06/26 1525 06/27 0624 06/28 0606    WBC       --            --            7.64         Hemoglobin       --            --            13.9         Hematocrit       --            --            42.5         Platelets       --            --            164         Protime       13.3            14.2            --         INR       1.0  Comment: INR has no defined significance when PT is within Reference Range.            1.1  Comment: INR has no defined significance when PT is within Reference Range.            --           Other (Up to last 3 results from the past 720 hours)    None             ASSESSMENT:  Vitals:  Temp:  [36.7 °C (98 °F)-37.2 °C (98.9 °F)] 36.7 °C (98.1 °F)  Heart Rate:  [56-83] 63  Resp:  [18] 18  BP: (101-174)/(50-76) 133/60    Lines/Drains/Airways:       Risk for Clinical Complications:  Constipation: Moderate  Falls: Moderate  Infection: Moderate    Precautions:  Existing Precautions/Restrictions: aspiration, fall    Current Diet:   Diet: pureed, mlidly thick/nectar thick liquids    Current Functional Status:    Current Function     Row Name 06/28/23 1138       Cognition    Orientation Status unable/difficult to assess    Affect/Mental Status anxious    Follows Commands follows one-step commands;25-49% accuracy    Row Name 06/28/23 1513       Cognition    Orientation Status unable/difficult to assess  ID confirmed on hospital bracelet    Affect/Mental Status anxious    Follows Commands follows one-step commands;0-24% accuracy    Cognitive Function attention deficit;executive function deficit;safety deficit    Attention Deficit moderate deficit;concentration;focused/sustained attention    Executive Function Deficit severe deficit;impulse control;insight/awareness of deficits;judgment;problem-solving/reasoning;planning/decision-making;organization/sequencing;self-monitoring/self-correction    Safety Deficit severe deficit;ability to follow commands;awareness of need for assistance;impulsivity;insight into deficits/self-awareness;judgment;problem-solving;safety precautions awareness;safety precautions follow-through/compliance    Comment, Cognition Pt with limited command following, demonstrates frustation with therapist assisting with mobility/tasks       Bed Mobility    Bed Mobility Activities left;supine to sit;sit to supine    [place] close supervision    Safety/Cues verbal cues;impulsivity    Assistive Device head of bed elevated    Comment impulsively standing from bed to use bathroom upon arrival       Sit/Stand Transfer    Surface edge of bed    [place] moderate assist (50-74% patient effort)    Safety/Cues moderate;verbal cues;impulsivity    Assistive Device none    Transfer Comments HHA       Toilet Transfer    Transfer Technique sit/stand    [place] minimum assist (75% or more patient effort)    Safety/Cues moderate;verbal cues;hand placement;impulsivity;technique    Assistive Device grab bars/safety frame       Lower Body Dressing    Tasks don;socks    [place] moderate assist (50-74% patient  effort)    Safety/Cues verbal cues;impulsivity;problem-solving    Position unsupported standing    Adaptive Equipment none    Comment impulsively attempting to lift leg to adjust socks, requiring Mod A for stability/safety/balance       Grooming    Tasks brushes/davis hair;washes, rinses and dries hands    [place] minimum assist (75% or more patient effort)    Safety/Cues verbal cues;impulsivity    Position unsupported standing;sitting up in bed    Setup Assistance obtain supplies    Adaptive Equipment none    Comment able to properly use brush this session compared to previous with Kayden MCLEAN A for balance and safety at sink for handing hygiene       Toileting    [place] minimum assist (75% or more patient effort)    Safety/Cues impulsivity;termination    Position unsupported sitting    Adaptive Equipment none    Comment cues to terminate tasks       Balance    Static Sitting Balance WFL    Dynamic Sitting Balance mild impairment    Sit to Stand Dynamic Balance mild impairment    Static Standing Balance mild impairment    Dynamic Standing Balance moderate impairment    Comment, Balance HHA       Impairments/Safety Issues    Impairments Affecting Function balance;cognition;coordination;endurance/activity tolerance;pain;strength    Cognitive Impairments, Safety/Performance impulsivity;insight into deficits/self-awareness;judgment;awareness, need for assistance;problem-solving/reasoning;attention;safety precaution awareness;safety precaution follow-through;sequencing abilities    Row Name 06/28/23 1514       Cognition    Orientation Status unable/difficult to assess  ID confirmed on bracelet on ankle    Affect/Mental Status anxious    Follows Commands follows one-step commands;0-24% accuracy    Cognitive Function attention deficit;executive function deficit;safety deficit    Attention Deficit moderate deficit;concentration;focused/sustained attention       Motor Skills    Functional Endurance Fair-       Bed  Mobility    Bed Mobility Activities left;supine to sit;sit to supine    [place] supervision    Safety/Cues verbal cues;impulsivity    Assistive Device head of bed elevated       Sit/Stand Transfer    Surface edge of bed    [place] moderate assist (50-74% patient effort)    Safety/Cues moderate;impulsivity    Assistive Device none    Transfer Comments Hand held assist       Gait Training    [place], Gait moderate assist (50-74% patient effort)    Safety/Cues increased time to complete;impulsivity;sequencing;technique    Assistive Device none    Distance in Feet 31 feet    Pattern step-through;step-to    Deviations/Abnormal Patterns base of support, narrow;argenis decreased;festinating/shuffling;ataxic    Comment (Gait/Stairs) impulsive t/o session, with variable assistance Min/Mod; increased assistance during change in direction and intermittently dragging her RLE neglect>decreased hip flexion       Stairs Training    Comment TBA as tolerated       Balance    Static Sitting Balance WFL    Dynamic Sitting Balance mild impairment    Sit to Stand Dynamic Balance mild impairment;supported    Static Standing Balance mild impairment;supported    Dynamic Standing Balance mild impairment;supported    Comment, Balance HHA       Impairments/Safety Issues    Impairments Affecting Function balance;cognition;coordination;endurance/activity tolerance;motor planning;strength    Cognitive Impairments, Safety/Performance impulsivity;insight into deficits/self-awareness;judgment;sequencing abilities;safety precaution awareness    Row Name 06/29/23 0800       Bathing    Bathing/Skin Care patient refused  non verbal    Row Name 06/29/23 1046       Cognition    Orientation Status unable/difficult to assess    Affect/Mental Status anxious    Follows Commands follows one-step commands;physical/tactile prompts required;verbal cues/prompting required;25-49% accuracy    Cognitive Function attention deficit    Attention Deficit  moderate deficit;focused/sustained attention;other (see comments)  R inattention    Executive Function Deficit moderate deficit;severe deficit;impulse control;information processing;insight/awareness of deficits;self-monitoring/self-correction    Row Name 06/29/23 2000       Bathing    Bathing/Skin Care linen changed    Row Name 06/30/23 0900       Cognition    Orientation Status other (see comments)  Aphasic not able to speak at this point in the day    Follows Commands follows one-step commands  inconsistant can be redirected       Bed Mobility    Bed Mobility Activities left    [place] minimum assist (75% or more patient effort);1 person assist    Safety/Cues maximal;impulsivity    Assistive Device head of bed elevated  30'    Comment sitting up long sitting in bed but can be redirected.       Sit/Stand Transfer    Surface edge of bed    [place] minimum assist (75% or more patient effort)    Safety/Cues moderate    Transfer Comments standing then advancing gait when asked to stay standing still. redirected with 2 more attempts to follow cueing.       Gait Training    [place], Gait minimum assist (75% or more patient effort);1 person assist    Distance in Feet 44 feet    Pattern step-through    Comment (Gait/Stairs) Impulsive, [place] sway min A for support and balance.       Stairs Training    Safety/Cues moderate       Balance    Static Sitting Balance moderate impairment    Dynamic Sitting Balance moderate impairment    Sit to Stand Dynamic Balance moderate impairment    Static Standing Balance moderate impairment    Dynamic Standing Balance moderate impairment    Comment, Balance Implsive       Impairments/Safety Issues    Impairments Affecting Function balance;cognition;coordination;strength;postural/trunk control    Cognitive Impairments, Safety/Performance attention    Row Name 06/30/23 0901       Cognition    Orientation Status unable/difficult to assess  aphasia present, responds to name     Affect/Mental Status anxious    Behavioral Issues overwhelmed easily    Follows Commands follows one-step commands;50-74% accuracy;delayed response/completion;increased processing time needed;initiation impaired;repetition of directions required;verbal cues/prompting required    Cognitive Function attention deficit;executive function deficit;safety deficit    Attention Deficit moderate deficit;focused/sustained attention;perseverates on recent task;distractible in noisy environment;alternating attention;concentration    Executive Function Deficit severe deficit;judgment;planning/decision-making;self-monitoring/self-correction;impulse control;organization/sequencing    Safety Deficit severe deficit;ability to follow commands;awareness of need for assistance;impulsivity;insight into deficits/self-awareness;judgment;problem-solving    Comment, Cognition frustrated easily       Vision Assessment/Intervention    Vision Assessment --  decreaed attention to R       Upper Extremity Assessment    General Observations L UE ROM and strength WFL       Right Shoulder    Shoulder, Right (ROM) WFL    Shoulder, Right (Strength) WFL       Right Elbow    Elbow, Right (ROM) WFL    Elbow, Right (Strength) WFL       Right Wrist    Wrist, Right (ROM) 0    Wrist, Right (Strength) 0       Right Hand    Hand, Right (ROM) 0    Hand, Right (Strength) 0       Motor Skills    Coordination fine motor deficit;right;severe impairment       Bed Mobility    Bed Mobility Activities left;supine to sit;sit to supine    [place] minimum assist (75% or more patient effort)    Safety/Cues maximal;impulsivity;sequencing    Assistive Device bed rails;head of bed elevated       Sit/Stand Transfer    Surface edge of bed    [place] minimum assist (75% or more patient effort)    Safety/Cues maximal;impulsivity;verbal cues    Assistive Device none       Toilet Transfer    Transfer Technique sit/stand    [place] minimum assist (75% or more  patient effort)    Safety/Cues impulsivity;maximal    Assistive Device none       Upper Body Dressing    Tasks doff;don;hospital gown    Start moderate assist (50-74% patient effort)    Safety/Cues moderate;sequencing;attention;compensatory techniques    Position supine       Lower Body Dressing    Tasks don;socks    Start minimum assist (75% or more patient effort)    Safety/Cues moderate;attention    Position supine       Grooming    Tasks washes, rinses and dries hands    Start moderate assist (50-74% patient effort)    Safety/Cues maximal;attention    Position sink side;unsupported standing    Comment hands soiled, assist to ensure clean       Toileting    Tasks adjust/manage clothing;perform bladder hygiene;perform bowel hygiene    Start minimum assist (75% or more patient effort)    Safety/Cues malaika/one-handed technique;impulsivity;compensatory techniques;problem-solving;sequencing    Position supported sitting;unsupported standing       Balance    Static Sitting Balance WFL;unsupported;sitting, edge of bed    Dynamic Sitting Balance mild impairment;unsupported;sitting, edge of bed    Sit to Stand Dynamic Balance mild impairment;supported    Static Standing Balance mild impairment;supported    Dynamic Standing Balance mild impairment;supported    Balance Interventions occupation based/functional task  grooming in stance    Comment, Balance no AD       Impairments/Safety Issues    Impairments Affecting Function balance;cognition;coordination;endurance/activity tolerance;grasp;visual/perceptual    Cognitive Impairments, Safety/Performance attention;awareness, need for assistance;impulsivity;insight into deficits/self-awareness;judgment;problem-solving/reasoning;safety precaution awareness;sequencing abilities    Safety Issues Affecting Function ability to follow commands;awareness of need for assistance;impulsivity;insight into deficits/self-awareness;judgment;problem-solving;safety  precaution awareness;sequencing abilities    Row Name 06/30/23 1000       Bathing    Bathing/Skin Care linen changed;bath, partial                Support System:  Designated Primary Caregiver: Friend Samara [phone]  Availability, Primary Caregiver: available part-time, coverage needed  Caregiver Engagement: advocates for the patient      Patient/Family Goals:  Patient's Goals For Discharge: return to all previous roles/activities      Educational Background:      RECOMMENDATIONS / PLAN:  Special Needs:  Is an  Needed/Used?: N  Spiritual, Cultural Beliefs, Shinto Practices, Values that Affect Care: no  DNR is current code status at referring facility?: No    Plan:  Identified Referral Needs: speech language pathology, physical therapy, occupational therapy, medical consultative services, neuropsychologist, dysphagia management  OT Frequency: 5-7 times per week  OT Intensity: 1 hour  PT Frequency: 5-7 times per week  PT Intensity: 1 hour  SLP Frequency: 5-7 times per week  SLP Intensity: 1 hour    Therapy Intensity: Requires, can tolerate and will benefit from 3 hours of therapy at least 5 days per week  Projected Length of Stay (days): 21 days  Patient is willing to participate in rehab program: yes    Impairments to be addressed: cognitive function, communication, mobility, motor dysfunction, safety, self-care, other (see comments) (swallow)  Medical Necessity Admission Criteria: abnormal labs, orthostasis/unstable blood pressure, active cardiac conditions    Expected Level of Function at Discharge:  Self-Care: Setup or clean-up assistance  Transfers: Setup or clean-up assistance  Locomotion: Setup or clean-up assistance  Communication: Setup or clean-up assistance  Social Cognition: Independent      Post-Discharge Needs:  Concerns to be Addressed: care coordination/care conferences, caregiver training, discharge planning  Anticipated Discharge Disposition: home with assistance, home with home  health  Type of Home Care Services: home PT, home OT, home SLP, nursing  Equipment Needed After Discharge: other (see comments) (TBD)  Current Discharge Risk: lives alone, physical impairment, other (see comments) (aphasia)

## 2023-06-30 NOTE — PROGRESS NOTES
Cardiology Daily Progress Note       Subjective    Jennifer Sams is a 78 y.o. female.    Overview:  Jennifer Sams is a 78 y.o. female with PMHx of Ischemic Cardiomyopathy, Heart block s/p Pacemaker (Medtronic 11/18/2019), CAD, NSTEMI (4/2019) with FRANK X 2 to LAD, FRANK X 4 to RCA and FRANK X1 to left circumflex (on ASA only, plavix stopped due to epistaxis), AAA, GERD who was admitted for altered mental status.     Patient lives alone and was found by her friend to be confused, combative and standing leaning to the right. In ED, She was noted to be aphasic with right sided weakness and hypertensive. CT of head showed acute/subacyte Infract in the left MCA with 3mm midline shift, no definite hemorrhage on repeat CT scan. Initial CT scan with possible punctate hemorrhage.   Patient is awake and alert and was nodding intermittently to questions but not the best historian given her acute CVA.     Interval history:  Pt resting in bed. She has expressive and receptive aphasia. Currently on heparin infusion for left apical thrombus. Plan to transition to oral eliquis today and transfer to rehab.      ECHO 6/27/23: LVEF 43%, localized apical aneurysm containing thrombus, possible mild AS, mild MR, normal RV, PASP 34.         Ace inhibitors, Atorvastatin, Brilinta [ticagrelor], Codeine, Dilaudid [hydromorphone], Morphine sulfate, Onion, and Oxycodone  Current Facility-Administered Medications   Medication Dose Route Frequency Provider Last Rate Last Admin   • acetaminophen (TYLENOL) tablet 650 mg  650 mg oral q6h PRN Kelby Martinez MD       • apixaban (ELIQUIS) tablet 5 mg  5 mg oral BID Juliette Bonilla CRNP       • cycloSPORINE (RESTASIS) 0.05 % ophthalmic emulsion 1 drop  1 drop Both Eyes q12h [facility] Richard Pandya MD   1 drop at 06/29/23 2046   • glucose chewable tablet 16-32 g of dextrose  16-32 g of dextrose oral PRN Kelby Martinez MD        Or   • dextrose 40 % oral gel 15-30 g of dextrose  15-30 g of  "dextrose oral PRN Kelby Martinez MD        Or   • glucagon (GLUCAGEN) injection 1 mg  1 mg intramuscular PRN Kelby Martinez MD        Or   • dextrose 50 % in water (D50) injection 12.5 g  25 mL intravenous PRN Kelby Martinez MD       • hydrALAZINE (APRESOLINE) injection 10 mg  10 mg intravenous q4h PRN Kelby Martinez MD   10 mg at 06/28/23 2249   • LORazepam (ATIVAN) injection 1 mg  1 mg intravenous Once in imaging Kelby Martinez MD       • magnesium sulfate IVPB 2g in 50 mL NSS/D5W/SWFI  2 g intravenous PRN Kelby Martinez MD       • ondansetron (ZOFRAN) injection 4 mg  4 mg intravenous q8h PRN Kelby Martinez MD       • pantoprazole (PROTONIX) tablet,delayed release (DR/EC) 40 mg  40 mg oral Daily Richard Pandya MD   40 mg at 06/30/23 0911   • potassium chloride (KLOR-CON M) ER tablet (particles/crystals) 20 mEq  20 mEq oral PRN Kelby Martinez MD       • potassium chloride (KLOR-CON M) ER tablet (particles/crystals) 40 mEq  40 mEq oral PRN Kelby Martinez MD       • potassium chloride 20 mEq in 100 mL IVPB  (premix)  20 mEq intravenous PRN Kelby Martinez MD       • potassium chloride 20 mEq in 100 mL IVPB  (premix)  20 mEq intravenous PRN Kelby Martinez MD        And   • potassium chloride 20 mEq in 100 mL IVPB  (premix)  20 mEq intravenous PRN Kelby Martinez MD       • rosuvastatin (CRESTOR) tablet 20 mg  20 mg oral Nightly Richard Pandya MD   20 mg at 06/29/23 2046   • senna (SENOKOT) tablet 1 tablet  1 tablet oral 2x daily PRN Kelby Martinez MD       • sodium chloride 0.9 % infusion   intravenous Continuous Kelby Martinez MD 80 mL/hr at 06/30/23 0928 New Bag at 06/30/23 0928       Objective   Visit Vitals  BP (!) 112/53 (BP Location: Left upper arm, Patient Position: Lying)   Pulse (!) 56   Temp 37.2 °C (98.9 °F) (Oral)   Resp 18   Ht 1.702 m (5' 7\")   Wt 64 kg (141 lb)   SpO2 93%   BMI 22.08 kg/m²       No intake or output data in the 24 hours ending 06/30/23 1345    Physical " Exam  Constitutional:       General: She is not in acute distress.     Appearance: She is well-developed. She is not diaphoretic.   HENT:      Head: Normocephalic.   Pulmonary:      Effort: No respiratory distress.      Breath sounds: Normal breath sounds. No wheezing.   Chest:      Chest wall: No tenderness.   Abdominal:      General: There is no distension.      Palpations: Abdomen is soft.      Tenderness: There is no abdominal tenderness.   Musculoskeletal:         General: Normal range of motion.      Cervical back: Normal range of motion.   Skin:     General: Skin is warm and dry.   Neurological:      Mental Status: She is alert.      Comments: Expressive and receptive aphasia         Labs   Lab Results   Component Value Date    WBC 4.88 06/30/2023    HGB 11.3 (L) 06/30/2023    HCT 34.3 (L) 06/30/2023     (L) 06/30/2023    CHOL 178 06/26/2023    TRIG 93 06/26/2023    HDL 71 06/26/2023    LDLCALC 88 06/26/2023    ALT 16 06/26/2023    AST 28 06/26/2023     06/28/2023    K 3.5 06/28/2023     (H) 06/28/2023    CREATININE 0.7 06/28/2023    BUN 12 06/28/2023    CO2 19 (L) 06/28/2023    TSH 2.94 04/27/2022    INR 1.1 06/28/2023    GLUCOSE 104 (H) 06/28/2023    HGBA1C 5.4 06/26/2023    TROPONINI <0.02 01/17/2020       Imaging  I have personally reviewed all imaging.    Cardiac Imaging    TRANSTHORACIC ECHO (TTE) COMPLETE 06/27/2023    Interpretation Summary  Left ventricle with normal dimensions and regional contraction abnormalities consistent with CAD in the LAD distribution: Localized apical infarction with aneurysm formation.  Paradoxical septal movement is consistent with ventricular pacing.  There is moderate left ventricular systolic and mild diastolic dysfunction.  LVEF 43%  Left ventricular apical thrombus  Left atrial pressure 14 mmHg  There is a visual suggestion of mild aortic stenosis. (Doppler assessment of the aortic valve is suboptimal)  Mild mitral regurgitation  Normal right  ventricle dimensions and systolic function: TAPSE 1.84 cm  Mild tricuspid regurgitation  PASP 34 mmHg  Normal left atrial volume index  No pericardial effusion or vegetations  AS-V pacing  Technically difficult study: No parasternal imaging obtained  This study was performed with Definity contrast to optimize evaluation of regional and global left ventricular function  No significant change from 3/28/2023 except for current demonstration of left ventricular apical thrombus      Telemetry  V-paced 60s    Assessment & Plan    * Cerebrovascular accident (CVA), unspecified mechanism (CMS/[facility])  Assessment & Plan  Patient with acute CVA in Left MCA territory.  There was a concern for possible bleed on initial CT scan but repeat with no hemorrhage but 3mm midline shift    -Management per Neurology   -No evidence of atrial fibrillation on telemetry, monitor  -ECHO 6/27/23: LVEF 43%, localized apical aneurysm containing thrombus, possible mild AS, mild MR, normal RV, PASP 34.   -Discontinue heparin infusion.  -Start eliquis 5 mg BID.  -Discontinue aspirin.  -Continue statin therapy.    Cardiac pacemaker  Assessment & Plan  Hx of heart block. S/p medtronic pacemaker.   V paced on telemetry   Follows with Dr. Carolina     No changes to management     Coronary artery disease involving native coronary artery of native heart with angina pectoris (CMS/[facility])  Assessment & Plan  non-STEMI 4/26/19, three-vessel CAD; status post FRANK x 2 LAD 4/27/19 + FRANK x 4 RCA 4/29/19 + FRANK x 1 LCX OM2 5/2/19    Continue ASA and Statin.  Plavix was discontinued due to epistaxis.     Ischemic cardiomyopathy  Assessment & Plan  Echo 6/27/23 stable EF 45-50%, follows with Dr. Salter. In past recommended Entresto ( was cost prohibitive) and then Valsartan but not started due to concerns of dizziness.      Once outside the permissive hypertensive window and NPO status, would start Valsartan            ARIAN Pabon  6/30/2023  1:45 PM      This patient note has been dictated using speech recognition software. Inadvertent speech recognition errors should be disregarded. Please do not hesitate to call my office for clarifications.

## 2023-06-30 NOTE — DISCHARGE SUMMARY
Hospital Medicine Service -  Inpatient Discharge Summary        BRIEF OVERVIEW   Admitting Provider: Kelby Martinez MD  Attending Provider: Richard Pandya MD Attending phys phone: [phone]    PCP: Chris Wang [phone]    Admission Date: 6/26/2023  Discharge Date: 7/1/2023     DISCHARGE DIAGNOSES      Primary Discharge Diagnosis  Cerebrovascular accident (CVA), unspecified mechanism (CMS/HCC)    Secondary Discharge Diagnoses  Active Hospital Problems    Diagnosis Date Noted   • Left ventricular apical thrombus 06/28/2023   • Cerebrovascular accident (CVA), unspecified mechanism (CMS/HCC) 06/26/2023   • Elevated blood pressure reading with diagnosis of hypertension 06/26/2023   • Cardiac pacemaker 12/09/2019   • Coronary artery disease involving native coronary artery of native heart with angina pectoris (CMS/HCC) 11/13/2019   • Ischemic cardiomyopathy 05/09/2019      Resolved Hospital Problems   No resolved problems to display.     SUMMARY OF HOSPITALIZATION      Presenting Problem/History of Present Illness  NSTEMI (non-ST elevated myocardial infarction) (CMS/HCC) [I21.4]  Coronary artery disease involving native coronary artery of native heart with angina pectoris (CMS/HCC) [I25.119]  Cerebrovascular accident (CVA), unspecified mechanism (CMS/HCC) [I63.9]    This is a 78 y.o. year-old female admitted on 6/26/2023 with NSTEMI (non-ST elevated myocardial infarction) (CMS/HCC) [I21.4]  Coronary artery disease involving native coronary artery of native heart with angina pectoris (CMS/HCC) [I25.119]  Cerebrovascular accident (CVA), unspecified mechanism (CMS/HCC) [I63.9].      Hospital Course  Patient was evaluated by neurologist Dr. Aparicio and the cardiologist Dr. Lorenzo.  Since with the pacer patient only received head CT and CTA study.  The last CT reported no hemorrhage.  Patient started with aspirin and heparin drip.  Patient's acute CVA in left MCA territory with right hemiparesis has been  evaluated and followed by PT OT.  Her dysphagia also improved and speech recommended soft diet with mild thick liquid.  Since the echocardiogram on June 27, 2023 reported left ventricle EF 43% and a localized apical aneurysm containing thrombus which most likely caused acute CVA.  Patient started with anticoagulation initially heparin drip and today changed to Eliquis.  No need for aspirin.  Patient's Crestor continued.  Patient will be transferred to [facility] for rehabilitation when bed is available.    Patient should be followed by cardiologist Dr. Lorenzo for her cardiac function and anticoagulation treatment.    Dc time: 34 mins    Exam on Day of Discharge  Physical Exam    Consults During Admission  IP CONSULT TO NEUROLOGY  IP CONSULT TO CASE MANAGEMENT  IP CONSULT TO CASE MANAGEMENT  IP CONSULT TO NEUROLOGY  IP CONSULT TO CARDIOLOGY  IP CONSULT TO PHYSICAL MEDICINE REHAB    PROCEDURES / LABS / IMAGING      Operative Procedures    Pertinent Labs  Lab Results   Component Value Date    GLUCOSE 104 (H) 06/28/2023    CALCIUM 8.8 06/28/2023     06/28/2023    K 3.5 06/28/2023    CO2 19 (L) 06/28/2023     (H) 06/28/2023    BUN 12 06/28/2023    CREATININE 0.7 06/28/2023     Lab Results   Component Value Date    WBC 4.88 06/30/2023    HGB 11.3 (L) 06/30/2023    HCT 34.3 (L) 06/30/2023    MCV 93.5 06/30/2023     (L) 06/30/2023       Pertinent Imaging  Transthoracic echo (TTE) complete    Addendum Date: 6/27/2023    Left ventricle with normal dimensions and regional contraction abnormalities consistent with CAD in the LAD distribution: Localized apical infarction with aneurysm formation.  Paradoxical septal movement is consistent with ventricular pacing.  There is moderate left ventricular systolic and mild diastolic dysfunction.  LVEF 43% Left ventricular apical thrombus Left atrial pressure 14 mmHg There is a visual suggestion of mild aortic stenosis. (Doppler assessment of the aortic  valve is suboptimal) Mild mitral regurgitation Normal right ventricle dimensions and systolic function: TAPSE 1.84 cm Mild tricuspid regurgitation PASP 34 mmHg Normal left atrial volume index No pericardial effusion or vegetations AS-V pacing Technically difficult study: No parasternal imaging obtained This study was performed with Definity contrast to optimize evaluation of regional and global left ventricular function No significant change from 3/28/2023 except for current demonstration of left ventricular apical thrombus    CT HEAD WITHOUT IV CONTRAST    Result Date: 6/27/2023  IMPRESSION: Acute/subacute left MCA distribution infarct with 3 mm midline shift.. No definite hemorrhage COMMENT: Technique: Computed tomography of the brain was performed utilizing contiguous 2.5 mm transaxial sections without intravenous contrast administration. CT DOSE:  One or more dose reduction techniques (e.g. automated exposure control, adjustment of the mA and/or kV according to patient size, use of iterative reconstruction technique) utilized for this examination. Comparison studies: Head CT dated June 26, 2023. Postsurgical change: None. Brain parenchyma: There is a large area of poor gray-white matter differentiation in the left frontal parietal lobe and subinsular region with 3 mm of midline shift.. No definite hemorrhage. There is intracranial atherosclerotic disease. White matter changes: Normal for age Ventricles, cisterns, and sulci: Normal in size and configuration. Sella and cerebellar tonsils: Normal in appearance. Calvarium and extra cranial soft tissues: Normal. Paranasal sinuses: Clear bilaterally. Mastoid air cell: Normal Orbits: Normal     CT HEAD WITHOUT IV CONTRAST    Result Date: 6/26/2023  IMPRESSION: Evolving left MCA territory infarct.  Small hyperdense foci within the infarct may reflect contrast staining from interval angiography versus punctate hemorrhage.  Short interval follow-up recommended.    CT HEAD  STROKE ALERT WITHOUT IV CONTRAST    Result Date: 6/26/2023  IMPRESSION: 1. Findings highly concerning for an acute infarct in the posterior aspect of the left MCA territory with an associated thrombosed vessel. 2. Chronic microangiopathy and involutional changes. Finding:    Stroke alert   Acuity: Critical  Status:  CLOSED Critical read back was performed and results were read back by ARIAN Washington on 6/26/2023 at 3:39 PM.     CT ANGIOGRAPHY HEAD/NECK WITH AND WITHOUT IV CONTRAST    Result Date: 6/26/2023  IMPRESSION: 1. Atherosclerosis at the carotid bifurcations with mild luminal narrowing measuring approximately 25% on the right and 20% on the left using NASCET criteria. Patent cervical carotid and vertebral arteries. 2. Termination of flow in an M3 branch of the left middle cerebral artery. 3. Intracranial atherosclerosis with significant narrowing involving the left V4 segment after the takeoff of the posterior inferior cerebellar artery. Mild irregularity of the basilar artery. 4. Completed infarct in the posterior aspect of the left MCA territory. No evidence of ischemic penumbra. rCBF <30% : 0 cc TMax > 6s: 2 cc Mismatch volume: 2 cc Mismatch Ratio: NA 5. Additional chronic and incidental findings, as above. In order to accelerate response time, other vascular pathology including occlusions of more distal arteries are not completely evaluated on this examination. Finding:    Stroke alert   Acuity: Critical  Status:  CLOSED Critical read back was performed and results were read back by Dr. Jordan, on 6/26/2023 at 4:03 PM. Critical read back was also performed by Dr. Aparicio and the results were critically read back at 4:07 PM.     CT BRAIN PERFUSION WITH IV CONTRAST    Result Date: 6/26/2023  IMPRESSION: 1. Atherosclerosis at the carotid bifurcations with mild luminal narrowing measuring approximately 25% on the right and 20% on the left using NASCET criteria. Patent cervical carotid and vertebral arteries. 2.  Termination of flow in an M3 branch of the left middle cerebral artery. 3. Intracranial atherosclerosis with significant narrowing involving the left V4 segment after the takeoff of the posterior inferior cerebellar artery. Mild irregularity of the basilar artery. 4. Completed infarct in the posterior aspect of the left MCA territory. No evidence of ischemic penumbra. rCBF <30% : 0 cc TMax > 6s: 2 cc Mismatch volume: 2 cc Mismatch Ratio: NA 5. Additional chronic and incidental findings, as above. In order to accelerate response time, other vascular pathology including occlusions of more distal arteries are not completely evaluated on this examination. Finding:    Stroke alert   Acuity: Critical  Status:  CLOSED Critical read back was performed and results were read back by Dr. Jordan, on 6/26/2023 at 4:03 PM. Critical read back was also performed by Dr. Aparicio and the results were critically read back at 4:07 PM.     X-RAY CHEST 1 VIEW    Result Date: 6/26/2023  IMPRESSION: No active disease in the chest.       OUTPATIENT  FOLLOW-UP / REFERRALS / PENDING TESTS        Outpatient Follow-Up Appointments            In 2 months Jermaine Carolina MD [facility] Electrophys[facility] at [facility]    In 2 months Jermaine Salter MD [facility] at [facility]          Referrals  No orders of the defined types were placed in this encounter.      Test Results Pending at Discharge  Unresulted Labs (From admission, onward)     Start     Ordered    06/30/23 1720  PTT  Once        Question:  Release to patient  Answer:  Immediate    06/30/23 1123    06/29/23 0600  CBC  Daily      Question:  Release to patient  Answer:  Immediate   Start Status   07/01/23 0600 Scheduled   07/02/23 0600 Scheduled   07/03/23 0600 Scheduled   07/04/23 0600 Scheduled   07/05/23 0600 Scheduled       06/28/23 1355    06/26/23 1512  [facility] Draw Panel  STAT        Question Answer Comment   Red  Top 1 Label    Gold Top 1 Label    Light Blue 1 Label    Lavender Top No Labels    Pink Top No Labels    Yellow - Urine Tall No Labels    Urine Culture Tube No Labels    Blood Culture No Labels        06/26/23 1512                Important Issues to Address in Follow-Up      DISCHARGE DISPOSITION      Disposition: Acute Care Facility - [facility]    Code Status At Discharge: Full Code    Physician Order for Life-Sustaining Treatment Document Status      No documents found

## 2023-06-30 NOTE — PLAN OF CARE
Plan of Care Review  Plan of Care Reviewed With: patient  Progress: no change  Outcome Evaluation: Patient aphasic but attempting to make her needs known. Denies pain, VSS. NIH 14, right facial droop; right hand weakness. Ambulates to the bathroom with assist. Heparin gtt stopped and will recieve Eliquis this evening. Plan for D/C to [facility] on 7/1/23 @1000  Problem: Adult Inpatient Plan of Care  Goal: Plan of Care Review  Outcome: Not progressing  Flowsheets (Taken 6/30/2023 1705)  Progress: no change  Outcome Evaluation:   Patient aphasic but attempting to make her needs known. Denies pain, VSS. NIH 14, right facial droop   right hand weakness. Ambulates to the bathroom with assist. Heparin gtt stopped and will recieve Eliquis this evening. Plan for D/C to [facility] on 7/1/23 @1000  Plan of Care Reviewed With: patient

## 2023-06-30 NOTE — PROGRESS NOTES
Occupational Therapy -  Daily Treatment/Progress Note     Patient: Jennifer Sams  Location: [address]  MRN: [id number]  Today's date: 6/30/2023    HISTORY OF PRESENT ILLNESS     Jennifer is a 78 y.o. female admitted on 6/26/2023 with NSTEMI (non-ST elevated myocardial infarction) (CMS/[facility]) [I21.4]  Coronary artery disease involving native coronary artery of native heart with angina pectoris (CMS/HCC) [I25.119]  Cerebrovascular accident (CVA), unspecified mechanism (CMS/HCC) [I63.9]. Principal problem is Cerebrovascular accident (CVA), unspecified mechanism (CMS/HCC).    Past Medical History  Jennifer has a past medical history of AAA (abdominal aortic aneurysm), Arthritis, Elevated blood pressure reading without diagnosis of hypertension (4/19/2019), Epistaxis (1/6/2020), GERD (gastroesophageal reflux disease) (4/19/2019), Glaucoma (4/19/2019), Heart murmur, Hiatal hernia, History of hip replacement, total, right (4/19/2019), History of rheumatic fever as a child (4/19/2019), Hypercholesterolemia (4/19/2019), Ischemic cardiomyopathy (5/9/2019), Kidney cysts, Osteoarthritis of multiple joints (4/19/2019), Osteoporosis, Pacemaker (12/9/2019), Raynaud's disease (4/19/2019), RSD (reflex sympathetic dystrophy) (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    History of Present Illness   78 y.o. female with a past medical history of AAA GERD CAD with a pacemaker. Lives at alone asked patient does still his own ADLs.  Patient has no family members except for friends who are basically are power of .  According to friend patient has not been in contact for the past 2 to 3 days and decided to visit patient.  Patient confused combative standing but on a leaning to the right side.  Patient needed to have EMS arrived as patient became combative needed to be bearhug throughout the trip.  Patient here in the ED was  aphasic with right-sided weakness patient's blood pressure was 190/84 temperature was 98.3 BUN was 14 glucose 117 hemoglobin was 15.8    CT brain perfusion: 1. Atherosclerosis at the carotid bifurcations with mild luminal narrowing measuring approximately 25% on the right and 20% on the left using NASCET criteria. Patent cervical carotid and vertebral arteries. 2. Termination of flow in an M3 branch of the left middle cerebral artery. 3. Intracranial atherosclerosis with significant narrowing involving the left V4 segment after the takeoff of the posterior inferior cerebellar artery. Mild irregularity of the basilar artery. 4. Completed infarct in the posterior aspect of the left MCA territory. No evidence of ischemic penumbra. rCBF <30% : 0 cc TMax > 6s: 2 cc Mismatch volume: 2 cc Mismatch Ratio: NA 5. Additional chronic and incidental findings, as above.    CT head (6/26): Evolving left MCA territory infarct.  Small hyperdense foci within the infarct may reflect contrast staining from interval angiography versus punctate hemorrhage.  Short interval follow-up recommended.    CT head (6/27): Acute/subacute left MCA distribution infarct with 3 mm midline shift.. No definite hemorrhage    PRIOR LEVEL OF FUNCTION AND LIVING ENVIRONMENT     Prior Level of Function    Flowsheet Row Most Recent Value   Ambulation independent   Transferring independent   Toileting independent   Bathing independent   Dressing independent   Eating independent   IADLs independent   Communication understands/communicates without difficulty   Swallowing swallows foods/liquids without difficulty   Baseline Diet/Method of Nutritional Intake thin liquids, regular   Past History of Dysphagia No known   Prior Level of Function Comment PLOF provided by patient's friend, Samara, due to patient with severe aphasia   Assistive Device Currently Used at Home walker, front-wheeled        Prior Living Environment    Flowsheet Row Most Recent Value   People  in Home alone   Current Living Arrangements home   Home Accessibility stairs to enter home (Group)   Living Environment Comment Per patient's friend, Samara, patient lives alone in a 2SH        Occupational Profile    Flowsheet Row Most Recent Value   Successful Occupations Indep baseline   Performance Patterns Has a cat, Loan   Environmental Supports and Barriers Friends nearby, lives alone        VITALS AND PAIN     OT Vitals    Date/Time Pulse SpO2 Pt Activity O2 Therapy BP BP Location BP Method Pt Position [facility]   06/30/23 0903 60 93 % At rest None (Room air) 101/50 Left upper arm Automatic Sitting KK   06/30/23 0921 56 -- -- -- 112/53 Left upper arm Automatic Lying KK      OT Pain    Date/Time Pain Type Rating: Rest Rating: Activity [facility]   06/30/23 0903 Pain Assessment 0 0 KK        Objective   OBJECTIVE     Start time:  0901  End time:  0925  Session Length: 24 min  Mode of Treatment: co-treatment, occupational therapy (cognition/safety/priority     ADLS)    General Observations  Patient received supine, in bed. She was no issues or concerns identified by nurse prior to session, agreeable to therapy. virtual shivani    Precautions: aspiration, fall, modified diet              OT Eval and Treat - 06/30/23 0901        Cognition    Orientation Status unable/difficult to assess   aphasia present, responds to name    Affect/Mental Status anxious     Behavioral Issues overwhelmed easily     Follows Commands follows one-step commands;50-74% accuracy;delayed response/completion;increased processing time needed;initiation impaired;repetition of directions required;verbal cues/prompting required     Cognitive Function attention deficit;executive function deficit;safety deficit     Attention Deficit moderate deficit;focused/sustained attention;perseverates on recent task;distractible in noisy environment;alternating attention;concentration     Executive Function Deficit severe  deficit;judgment;planning/decision-making;self-monitoring/self-correction;impulse control;organization/sequencing     Safety Deficit severe deficit;ability to follow commands;awareness of need for assistance;impulsivity;insight into deficits/self-awareness;judgment;problem-solving     Comment, Cognition frustrated easily        Vision Assessment/Intervention    Vision Assessment --   decreaed attention to R       Upper Extremity Assessment    General Observations L UE ROM and strength WFL        Upper Extremity Range of Motion    Shoulder, Right (ROM) WFL     Elbow, Right (ROM) WFL     Wrist, Right (ROM) 0     Hand, Right (ROM) 0        Upper Extremity Strength    Shoulder, Right (Strength) WFL     Elbow, Right (Strength) WFL     Wrist, Right (Strength) 0     Hand, Right (Strength) 0        Motor Skills    Coordination fine motor deficit;right;severe impairment        Bed Mobility    Bed Mobility Activities left;supine to sit;sit to supine     [place] minimum assist (75% or more patient effort)     Safety/Cues maximal;impulsivity;sequencing     Assistive Device bed rails;head of bed elevated        Mobility Belt    Mobility Belt Used for All Out of Bed Activity no     Reason Mobility Belt Not Used patient refused     Reason Mobility Belt Not Used Pt agitated by belt, removed        Sit/Stand Transfer    Surface edge of bed     [place] minimum assist (75% or more patient effort)     Safety/Cues maximal;impulsivity;verbal cues     Assistive Device none        Toilet Transfer    Transfer Technique sit/stand     [place] minimum assist (75% or more patient effort)     Safety/Cues impulsivity;maximal     Assistive Device none        Upper Body Dressing    Tasks doff;don;hospital gown     [place] moderate assist (50-74% patient effort)     Safety/Cues moderate;sequencing;attention;compensatory techniques     Position supine        Lower Body Dressing    Tasks don;socks     [place] minimum assist  (75% or more patient effort)     Safety/Cues moderate;attention     Position supine        Grooming    Tasks washes, rinses and dries hands     [place] moderate assist (50-74% patient effort)     Safety/Cues maximal;attention     Position sink side;unsupported standing     Comment hands soiled, assist to ensure clean        Toileting    Tasks adjust/manage clothing;perform bladder hygiene;perform bowel hygiene     [place] minimum assist (75% or more patient effort)     Safety/Cues malaika/one-handed technique;impulsivity;compensatory techniques;problem-solving;sequencing     Position supported sitting;unsupported standing        Balance    Static Sitting Balance WFL;unsupported;sitting, edge of bed     Dynamic Sitting Balance mild impairment;unsupported;sitting, edge of bed     Sit to Stand Dynamic Balance mild impairment;supported     Static Standing Balance mild impairment;supported     Dynamic Standing Balance mild impairment;supported     Balance Interventions occupation based/functional task   grooming in stance    Comment, Balance no AD        Impairments/Safety Issues    Impairments Affecting Function balance;cognition;coordination;endurance/activity tolerance;grasp;visual/perceptual     Cognitive Impairments, Safety/Performance attention;awareness, need for assistance;impulsivity;insight into deficits/self-awareness;judgment;problem-solving/reasoning;safety precaution awareness;sequencing abilities     Safety Issues Affecting Function ability to follow commands;awareness of need for assistance;impulsivity;insight into deficits/self-awareness;judgment;problem-solving;safety precaution awareness;sequencing abilities                               Education Documentation  Rehabilitation Therapy, taught by Lydia Mcdowell OT at 6/30/2023  9:37 AM.  Learner: Patient  Readiness: Acceptance  Method: Explanation  Response: Needs Reinforcement  Comment: falls pervention, safety, safe and effective ADLs, call  courtney use        Session Outcome  Patient supine, in bed at end of session, bed alarm on, all needs met, call light in reach, personal items in reach (RN and virtual shivani present). Nursing notified about patient's performance, patient's position, and patient's response to therapy/activity.    AM-PAC™ - ADL (Current Function)     Putting on/taking off regular lower body clothing 2 - A Lot   Bathing 2 - A Lot   Toileting 3 - A Little   Putting on/taking off regular upper body clothing 3 - A Little   Help for taking care of personal grooming 2 - A Lot   Eating meals 3 - A Little   AM-PAC™ ADL Score 15      ASSESSMENT AND PLAN     Progress Summary  [facility] 15, MIN A functional transfers with no AD, MIN/MOD A ADLs, cognitive deficits present with Pt higly impulsive, R UE with distal weakness, decreased attention to R, ADLs not at baseline, Rec continued OT and acute rehab    Patient/Family Therapy Goal Statement: unable to express      OT Plan    Flowsheet Row Most Recent Value   Rehab Potential good, to achieve stated therapy goals at 06/27/2023 1403   Therapy Frequency 4 times/wk at 06/30/2023 0901   Planned Therapy Interventions BADL retraining, functional balance retraining, occupation/activity based interventions, patient/caregiver education/training, neuromuscular control/coordination retraining, transfer/mobility retraining, ROM/therapeutic exercise, strengthening exercise at 06/30/2023 0901          OT Discharge Recommendations    Flowsheet Row Most Recent Value   OT Recommended Discharge Disposition acute rehab/Inpatient Rehab Facility at 06/30/2023 0901   Anticipated Equipment Needs At Discharge (OT) none at 06/30/2023 0901               OT Goals    Flowsheet Row Most Recent Value   Bed Mobility Goal 1    Activity/Assistive Device bed mobility activities, all at 06/27/2023 1403   [place] modified independence at 06/27/2023 1403   Time Frame by discharge at 06/27/2023 1403   Progress/Outcome goal ongoing at  06/27/2023 1403   Transfer Goal 1    Activity/Assistive Device sit-to-stand/stand-to-sit, bed-to-chair/chair-to-bed, toilet at 06/27/2023 1403   [place] minimum assist (75% or more patient effort) at 06/27/2023 1403   Time Frame by discharge at 06/27/2023 1403   Progress/Outcome goal ongoing at 06/27/2023 1403   Dressing Goal 1    Activity/Adaptive Equipment dressing skills, all at 06/27/2023 1403   [place] minimum assist (75% or more patient effort) at 06/27/2023 1403   Time Frame by discharge at 06/27/2023 1403   Progress/Outcome goal ongoing at 06/27/2023 1403   Toileting Goal 1    Activity/Assistive Device toileting skills, all at 06/27/2023 1403   [place] minimum assist (75% or more patient effort) at 06/27/2023 1403   Time Frame by discharge at 06/27/2023 1403   Progress/Outcome goal ongoing at 06/27/2023 1403   Grooming Goal 1    Activity/Assistive Device grooming skills, all at 06/27/2023 1403   [place] minimum assist (75% or more patient effort) at 06/27/2023 1403   Time Frame by discharge at 06/27/2023 1403   Progress/Outcome goal ongoing at 06/27/2023 1403

## 2023-06-30 NOTE — PLAN OF CARE
Care Coordination Discharge Plan Note     Discharge Needs Assessment  Concerns to be Addressed: discharge planning  Current Discharge Risk:      Anticipated Discharge Plan  Anticipated Discharge Disposition: acute rehab/Inpatient Rehab Facility       Patient Choice  Offered/Gave Vendor List: no  Patient's Choice of Community Agency(s):           Discharge Barriers   Barriers to Discharge:    ---------------------------------------------------------------------------------------------------------------------    Interdisciplinary Discharge Plan Review:  Participants:     Concerns Comments: PM&R following and diet has been ordered.    Discharge Plan:   Disposition/Destination: Acute Care Facility - [facility] Hospital (Acute Care Facility)  Discharge Facility:   Destination - Admitted Since 6/26/2023     Service Provider Selected Services Address Phone Fax Patient Preferred Last Updated    [facility] [address] [place] [address] [phone] -- Tanya Ortiz RN 6/30/2023 1438       Internal Comment last updated by Tanya Ortiz, RN 6/30/2023 1438    Call main number [phone]                   Community Resources:      Discharge Transportation:  Is Out of Hospital DNR needed at Discharge: no  Does patient need discharge transport? Yes  Discharge Transportation Vendor: other (see comment)  Type of Transportation: basic life support  What day is the transport expected?: 07/01/23  What time is the transport expected?: 1000    [facility] was unable to accept patient today, patient's transportation was scheduled too late and also just transitioning off hep gtt.  Patient's POA has been updated.  RNCC will continue to follow and support dc. Trip [id number]

## 2023-06-30 NOTE — PLAN OF CARE
Problem: Adult Inpatient Plan of Care  Goal: Plan of Care Review  Outcome: Progressing  Flowsheets (Taken 6/30/2023 6001)  Progress: no change  Outcome Evaluation: PT aphasic attempting to voice needs unable to verbalize, using gestures, denied any pain continue on heparin drip, PTT q 6 hrs, NIH 14 plan for discharge to [facility], safety maintained, call light within reach.  Plan of Care Reviewed With: patient

## 2023-06-30 NOTE — PLAN OF CARE
Care Coordination Discharge Plan Summary    Admission Assessment Summary    General Information  Readmission Within the last 30 days: no previous admission in last 30 days  Does patient have a :    Patient-Specific Goals (include timeframe): patient is unable to state - aphasic    Living Arrangements  Arrived From: home  Current Living Arrangements: home  People in Home: alone  Home Accessibility: stairs to enter home (Group)  Living Arrangement Comments: patient lives alone in a 2sh home with one step    Social Determinants of Health - Screenings  Housing Stability  In the last 12 months, was there a time when you were not able to pay the mortgage or rent on time?: No  In the last 12 months, was there a time when you did not have a steady place to sleep or slept in a shelter (including now)?: No  Financial Resource Strain  How hard is it for you to pay for the very basics like food, housing, medical care, and heating?: Not hard at all  Transportation Needs  In the past 12 months, has lack of transportation kept you from medical appointments or from getting medications?: No  In the past 12 months, has lack of transportation kept you from meetings, work, or from getting things needed for daily living?: No    Functional Status Prior to Admission  Assistive Device/Animal Currently Used at Home: walker, front-wheeled  Functional Status Comments: needs assistance  IADL Comments: needs assistance    Discharge Needs Assessment    Concerns to be Addressed: discharge planning  Current Discharge Risk:    Anticipated Changes Related to Illness: inability to care for self, inability to care for someone else    Discharge Plan Summary    Patient Choice  Offered/Gave Vendor List: no       Discharge Plan:  Disposition/Destination: Acute Care Facility - [facility] / Hospital (Acute Care Facility)       Connection to Community     Community Resources:      Discharge Transportation:  Is Out of Hospital DNR needed at  Discharge: no  Does patient need discharge transport? Yes       Per Dr. Pandya, patient is medically stable for d/c today to [facility].  Patient will be transitioned to Eliquis and hep gtt will be d/c'd. .  RNCC notified Layne at [facility].          Add:12:22 RNCC called POSHENA Samara  And reviewed dispo plan for today.  RNCC explained IMM letter and Samara is in agreement by verbally signing  the IMM letter.  Family also requested that they tell th patient that she is discharged today, they are afraid that the patient will be upset.  RNCC did attempt to tell the patient prior to the conversation with the POA/Thelma, however, she was sound asleep.  RNCC will continue to follow and support dc.     Add:13:45 Patient's d/c has been cancelled.  Pt's POA has been updated on new dispo plan.  RNCC will continue to follow and support dc.

## 2023-07-01 ENCOUNTER — HOSPITAL ENCOUNTER (INPATIENT)
Facility: REHABILITATION | Age: 78
LOS: 13 days | Discharge: HOME | DRG: 057 | End: 2023-07-14
Attending: PHYSICAL MEDICINE & REHABILITATION | Admitting: PHYSICAL MEDICINE & REHABILITATION
Payer: MEDICARE

## 2023-07-01 VITALS
RESPIRATION RATE: 16 BRPM | OXYGEN SATURATION: 93 % | SYSTOLIC BLOOD PRESSURE: 134 MMHG | HEIGHT: 67 IN | BODY MASS INDEX: 22.13 KG/M2 | WEIGHT: 141 LBS | DIASTOLIC BLOOD PRESSURE: 60 MMHG | HEART RATE: 60 BPM | TEMPERATURE: 98.2 F

## 2023-07-01 DIAGNOSIS — I63.512 ACUTE ISCHEMIC LEFT MCA STROKE (CMS/HCC): Primary | ICD-10-CM

## 2023-07-01 DIAGNOSIS — F43.23 ADJUSTMENT DISORDER WITH MIXED ANXIETY AND DEPRESSED MOOD: ICD-10-CM

## 2023-07-01 PROBLEM — M21.331 RIGHT WRIST DROP: Status: ACTIVE | Noted: 2023-07-01

## 2023-07-01 PROBLEM — Z09 FOLLOW-UP EXAM: Status: ACTIVE | Noted: 2023-07-01

## 2023-07-01 LAB
ERYTHROCYTE [DISTWIDTH] IN BLOOD BY AUTOMATED COUNT: 12.7 % (ref 11.7–14.4)
HCT VFR BLDCO AUTO: 33.4 % (ref 35–45)
HGB BLD-MCNC: 11.2 G/DL (ref 11.8–15.7)
MCH RBC QN AUTO: 30.9 PG (ref 28–33.2)
MCHC RBC AUTO-ENTMCNC: 33.5 G/DL (ref 32.2–35.5)
MCV RBC AUTO: 92.3 FL (ref 83–98)
PDW BLD AUTO: 8.8 FL (ref 9.4–12.3)
PLATELET # BLD AUTO: 125 K/UL (ref 150–369)
RBC # BLD AUTO: 3.62 M/UL (ref 3.93–5.22)
WBC # BLD AUTO: 4.74 K/UL (ref 3.8–10.5)

## 2023-07-01 PROCEDURE — 63700000 HC SELF-ADMINISTRABLE DRUG: Performed by: HOSPITALIST

## 2023-07-01 PROCEDURE — 12800000 HC ROOM AND CARE SEMIPRIVATE REHAB

## 2023-07-01 PROCEDURE — 63700000 HC SELF-ADMINISTRABLE DRUG: Performed by: NURSE PRACTITIONER

## 2023-07-01 PROCEDURE — 85027 COMPLETE CBC AUTOMATED: CPT | Performed by: HOSPITALIST

## 2023-07-01 PROCEDURE — 63700000 HC SELF-ADMINISTRABLE DRUG: Performed by: STUDENT IN AN ORGANIZED HEALTH CARE EDUCATION/TRAINING PROGRAM

## 2023-07-01 PROCEDURE — 36415 COLL VENOUS BLD VENIPUNCTURE: CPT | Performed by: HOSPITALIST

## 2023-07-01 RX ORDER — SENNOSIDES 8.6 MG/1
1 TABLET ORAL 2 TIMES DAILY PRN
Status: DISCONTINUED | OUTPATIENT
Start: 2023-07-01 | End: 2023-07-14 | Stop reason: HOSPADM

## 2023-07-01 RX ORDER — ROSUVASTATIN CALCIUM 20 MG/1
20 TABLET, COATED ORAL NIGHTLY
Status: DISCONTINUED | OUTPATIENT
Start: 2023-07-01 | End: 2023-07-14 | Stop reason: HOSPADM

## 2023-07-01 RX ORDER — ACETAMINOPHEN 325 MG/1
650 TABLET ORAL EVERY 6 HOURS PRN
Status: DISCONTINUED | OUTPATIENT
Start: 2023-07-01 | End: 2023-07-14 | Stop reason: HOSPADM

## 2023-07-01 RX ORDER — PANTOPRAZOLE SODIUM 40 MG/1
40 TABLET, DELAYED RELEASE ORAL DAILY
Status: DISCONTINUED | OUTPATIENT
Start: 2023-07-02 | End: 2023-07-06

## 2023-07-01 RX ORDER — CYCLOSPORINE 0.5 MG/ML
1 EMULSION OPHTHALMIC EVERY 12 HOURS
Status: DISCONTINUED | OUTPATIENT
Start: 2023-07-01 | End: 2023-07-14 | Stop reason: HOSPADM

## 2023-07-01 RX ORDER — NITROGLYCERIN 0.4 MG/1
0.4 TABLET SUBLINGUAL EVERY 5 MIN PRN
Status: DISCONTINUED | OUTPATIENT
Start: 2023-07-01 | End: 2023-07-03

## 2023-07-01 RX ORDER — IBUPROFEN/PSEUDOEPHEDRINE HCL 200MG-30MG
3 TABLET ORAL NIGHTLY
Status: DISCONTINUED | OUTPATIENT
Start: 2023-07-01 | End: 2023-07-14 | Stop reason: HOSPADM

## 2023-07-01 RX ADMIN — Medication 3 MG: at 20:41

## 2023-07-01 RX ADMIN — PANTOPRAZOLE SODIUM 40 MG: 40 TABLET, DELAYED RELEASE ORAL at 10:13

## 2023-07-01 RX ADMIN — APIXABAN 5 MG: 5 TABLET, FILM COATED ORAL at 20:41

## 2023-07-01 RX ADMIN — CYCLOSPORINE 1 DROP: 0.5 EMULSION OPHTHALMIC at 20:44

## 2023-07-01 RX ADMIN — ROSUVASTATIN CALCIUM 20 MG: 20 TABLET, FILM COATED ORAL at 20:41

## 2023-07-01 RX ADMIN — CYCLOSPORINE 1 DROP: 0.5 EMULSION OPHTHALMIC at 10:13

## 2023-07-01 RX ADMIN — TRAZODONE HYDROCHLORIDE 25 MG: 50 TABLET, FILM COATED ORAL at 20:46

## 2023-07-01 RX ADMIN — APIXABAN 5 MG: 5 TABLET, FILM COATED ORAL at 10:13

## 2023-07-01 NOTE — PLAN OF CARE
Problem: Adult Inpatient Plan of Care  Goal: Plan of Care Review  Outcome: Progressing  Flowsheets (Taken 7/1/2023 0319)  Progress: no change  Outcome Evaluation: PT scheduled for discharge today to acute rehab, denied any pain fluid and heparin drips discontinue, NIH 14, started on eliquis, safety maintained, vss, call light within reach.  Plan of Care Reviewed With: patient

## 2023-07-01 NOTE — NURSING NOTE
D/c plan reviewed with patient and friend at bedside.  VSS.  Transfer to [facility] today for therapy.  Report attempted this morning; will reattempt.

## 2023-07-01 NOTE — NURSING NOTE
Nursing report given to Shwetha RAMIREZ at [facility].  All questions answered.

## 2023-07-01 NOTE — ASSESSMENT & PLAN NOTE
[address] [facility]  ROUTINE H AND PS    Name:  Joanne John  MR#:  [id number]  :  1947  Account #:  [de-identified]  Date of Adm:  10/02/2017      PRIMARY CARE DOCTOR: Does not have one. CHIEF COMPLAINT: Left-sided weakness. HISTORY OF PRESENT ILLNESS: This is a 80-year-old   female with no significant medical problems, comes to the emergency  room with left-sided weakness. The patient says she woke up this  morning and was trying to go downstairs and felt weak on her left side  and she could not walk well, so she came back to bed and called the  family. The patient's family came in. They noticed some facial droop  and also slurred speech, which lasted for 2-4 hours and then started  resolving. When I saw the patient, her symptoms were completely  resolved. She states she feels completely back to normal. Her speech  was back and does not have any facial droop or weakness either. The  patient denies any headache, blurring of vision. Denies any weakness  or numbness over the body now. Denies any chest pain, chest  tightness. The patient does admit that she takes aspirin every day and  has been taking regularly. She denies any previous strokes or denies  any chest pain or chest tightness. PAST MEDICAL HISTORY: None other than some neck pain. PAST SURGICAL HISTORY: Hysterectomy. FAMILY HISTORY: Father  of cancer and also had heart attack. No history of strokes in the family. SOCIAL HISTORY: Denies any smoking, alcohol or illicit drug use. ALLERGIES: NO KNOWN DRUG ALLERGIES. HOME MEDICATIONS: The patient takes aspirin 81 mg 2 tablets daily. PHYSICAL EXAMINATION  GENERAL: The patient is moderately built, moderately nourished,   female who is alert, awake, oriented x3, pleasant and  communicative, not in acute distress at this moment.   VITAL SIGNS: Temperature 98.6, pulse around 70, blood pressure  when I saw the patient was 160/66, respiratory rate 20, Outreach attempt was made to schedule a Medicare Wellness Visit. This was the first attempt. Contact was made, MWV appointment scheduled.Patient had a visit on 09/12/2022   The patient is a 78-year-old  female with a history of AAA, heart block s/p cardiac pacemaker, glaucoma, hyperlipidemia, coronary artery disease, NSTEMI, who presented to [facility] on June 26 after she has not been seen by her friends for a few days.   In the emergency department she was aphasic with right-sided weakness and hypertensive to the 190 systolic.  CT scan of the head revealed an acute infarct in left MCA with mild bleeding.  Echo showed an EF of 43% and a bubble study was negative.  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  Later this was transitioned to apixaban.        -Full program PT/OT/SLP/rehabilitation RN/psychology. Consult internal medicine for comorbidities.  -Continue apixaban  -Continue statin  -Sleep - melatonin and trazodone  -Safety - one-to-one              saturating 97%  on room air. HEENT: PERRLA, EOMI. Oral mucosa moist. No nasal discharge. Throat clear. NECK: Supple without JVD, trachea midline, no thyromegaly or carotid  bruit heard. LYMPHATICS: No supraclavicular, cervical lymph nodes seen. RESPIRATORY: Bilateral clear on auscultation. No wheezing, no  rhonchi heard. CARDIOVASCULAR: S1, S2 heard, regular rate and rhythm. No  murmurs heard. ABDOMEN: Bowel sounds present, soft, nontender, nondistended. NEUROLOGIC: The patient alert, awake, oriented x3. Speech clear. No facial droop noted. Motor strength 5/5 both upper and lower  extremities. Cranial nerves 2 through 12 grossly intact. EXTREMITIES: No clubbing, no cyanosis, no pedal edema. SKIN: No rashes. LABORATORY DATA: WBC 12.0, hemoglobin 13.3, hematocrit 40.9,  platelets are 456. INR 1.0. Sodium 141, potassium 4.0, chloride 108,  bicarbonate 25, glucose 122, BUN 15, creatinine 0.71, total bilirubin  0.4, ALT 25, AST 20. CT head is no acute intracranial hemorrhage or  mass effect or midline shift. Two lesions of lower noted. Chest x-ray, no acute cardiopulmonary process. EKG: Normal sinus rhythm, no previous EKG to compare. ASSESSMENT AND PLAN: This is a 79-year-old  female  who presents to the emergency room with left-sided weakness which  has resolved now. This patient will be admitted to the hospital under  observation with the below impression. 1. Transient ischemic attack, rule out cerebrovascular accident. We will  get MRI brain and also MRA neck. Further plan according to the MRIs. Discussed with Neurology, Dr. Terri Mao, recommends to continue  aspirin alone for now and if the patient's MRI, further plan of adding  Plavix. Will also start her on statins and a lipid profile will be obtained. 2. Possible new onset hypertension versus permissive hypertension. Will keep her on labetalol p.r.n.  3. Advanced directives. The patient wants FULL CODE.     The patient alert, awake, oriented x3, along with her daughter and son  at the bedside. I discussed with all of them about the hospital  admission and planned care in the hospital. All of them completely  understood and agreed with the plan. I also answered all their  questions and concerns at the bedside appropriately.         Ana Mcintyre MD    BT / DC  D:  10/02/2017   17:59  T:  10/02/2017   18:50  Job #:  [id number]

## 2023-07-01 NOTE — PLAN OF CARE
Plan of Care Review  Plan of Care Reviewed With: patient  Progress: improving  Outcome Evaluation: Admit to [facility], AA to self, global aphasia, difficult to redirect, initially resistant to gait belt and getting in bed, took 20 minutes with assistance from transport personel to assist Sharlene to bed for assessment. Attempting to get OOB to use BR, throwing legs over side rails with this nurse and POA present, unable to redirect. Unable to orient to call bell, poor appetite, refusing all but PO fluids and small sips of tomoto soup, numerous foods attempted. 1:1 initiated at 3pm, Sharlene's anxiety has decreased slightly s/p transfer but remains impulsive and frustrated with communication efforts. Continent of bladder, asist x 2 for safety, hannah care provided.

## 2023-07-01 NOTE — H&P
Physical Medicine and Rehabilitation History and Physical       Admitting Diagnosis: Acute ischemic left MCA stroke (CMS/[facility]) [I63.512]    HPI     Jennifer Sams is a 78 y.o. female whose primary indication for inpatient rehabilitation is: Stroke:  01.2  Right Body Involvement (Left Brain).     History limited by aphasia.  Collateral provided by friend at bedside.    The patient is a 78-year-old  female with a history of AAA, heart block s/p cardiac pacemaker, glaucoma, hyperlipidemia, coronary artery disease, NSTEMI, who presented to [facility] on June 26 after she has not been seen by her friends for a few days.  Patient lives alone and when she was found by EMS she was confused and combative.  In the emergency department she was aphasic with right-sided weakness and hypertensive to the 190 systolic.  CT scan of the head revealed an acute infarct in left MCA with mild bleeding.  Echo showed an EF of 43% and a bubble study was negative.  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  Later this was transitioned to apixaban.  She was continued on statin for secondary prevention.  She required virtual shivani while in the hospital but this was not effective.  She was evaluated by speech therapy and cleared for a soft and bite-size diet with moderately thick liquids.    The patient lives alone with no immediate family and her 2 friends are her power of .      Past Medical History:   Diagnosis Date   • AAA (abdominal aortic aneurysm)    • Arthritis    • Elevated blood pressure reading without diagnosis of hypertension 4/19/2019   • Epistaxis 1/6/2020   • GERD (gastroesophageal reflux disease) 4/19/2019   • Glaucoma 4/19/2019   • Heart murmur    • Hiatal hernia    • History of hip replacement, total, right 4/19/2019    Right hip 9/ 2016 and revision 2017    • History of rheumatic fever as a child 4/19/2019   •  Hypercholesterolemia 4/19/2019   • Ischemic cardiomyopathy 5/9/2019   • Kidney cysts    • Osteoarthritis of multiple joints 4/19/2019   • Osteoporosis    • Pacemaker 12/9/2019   • Raynaud's disease 4/19/2019   • RSD (reflex sympathetic dystrophy) 4/19/2019    Of left foot   • SOB (shortness of breath) 12/10/2019   • Status post insertion of drug eluting coronary artery stent 5/9/2019     Past Surgical History:   Procedure Laterality Date   • CHOLECYSTECTOMY OPEN     • CORONARY ANGIOPLASTY WITH STENT PLACEMENT  04/29/2019    of LAD   • CORONARY ANGIOPLASTY WITH STENT PLACEMENT  04/30/2019    of RCA   • DILATION AND CURETTAGE OF UTERUS     • EYE SURGERY      RIGHT CATARACT   • FOOT SURGERY Left    • JOINT REPLACEMENT Right 09/2016    total hip   • REVISION TOTAL HIP ARTHROPLASTY Right 2017   • TONSILLECTOMY         Social History     Socioeconomic History   • Marital status: Single   Tobacco Use   • Smoking status: Never   • Smokeless tobacco: Never   Vaping Use   • Vaping Use: Never used   Substance and Sexual Activity   • Alcohol use: Not Currently   • Drug use: No   • Sexual activity: Defer     Social Determinants of Health     Financial Resource Strain: Low Risk  (6/28/2023)    Overall Financial Resource Strain (CARDIA)    • Difficulty of Paying Living Expenses: Not hard at all   Food Insecurity: No Food Insecurity (6/27/2023)    Hunger Vital Sign    • Worried About Running Out of Food in the Last Year: Never true    • Ran Out of Food in the Last Year: Never true   Transportation Needs: No Transportation Needs (6/28/2023)    PRAPARE - Transportation    • Lack of Transportation (Medical): No    • Lack of Transportation (Non-Medical): No   Housing Stability: Unknown (6/28/2023)    Housing Stability Vital Sign    • Unable to Pay for Housing in the Last Year: No    • Unstable Housing in the Last Year: No       Prior Living Arrangements: People in Home: alone  Current Living Arrangements: home  Home Accessibility:  stairs to enter home (Group)  Living Environment Comment: 2 SH, 1 DARÍO      Prior Function Level: Ambulation: independent  Transferring: independent  Toileting: independent  Bathing: independent  Dressing: independent  Eating: independent  IADLs: independent  Communication: understands/communicates without difficulty  Swallowing: swallows foods/liquids without difficulty  Baseline Diet/Method of Nutritional Intake: no diet restrictions  Past History of Dysphagia: No  Assistive Device/Animal Currently Used at Home: walker, front-wheeled  Prior Level of Function Comment: I PTA      Family History   Problem Relation Age of Onset   • Congenital heart disease Biological Mother         noted at autopsy   • Pulmonary fibrosis Biological Father    • Heart attack Paternal Grandfather        Allergies: Ace inhibitors, Atorvastatin, Brilinta [ticagrelor], Codeine, Dilaudid [hydromorphone], Morphine sulfate, Onion, and Oxycodone    • apixaban  5 mg oral BID   • cycloSPORINE  1 drop Both Eyes q12h ROBERTO   • melatonin  3 mg oral Nightly   • [START ON 7/2/2023] pantoprazole  40 mg oral Daily   • rosuvastatin  20 mg oral Nightly   • trazodone  25 mg oral Nightly       •  acetaminophen  •  nitroglycerin  •  senna      Review of Systems  Review of systems not obtained due to patient factors.  Aphasia    Objective   Vital Signs for the last 24 hours:  Temp:  [36.6 °C (97.9 °F)-36.9 °C (98.4 °F)] 36.6 °C (97.9 °F)  Heart Rate:  [59-93] 60  Resp:  [16-20] 16  BP: (129-163)/(57-71) 138/61    Physical Exam  General Appearance:    Alert, no distress, appears stated age   Head:   Atraumatic normocephalic   Eyes:    PERRL, conjunctiva/corneas clear      Neck:   Trachea midline    Lungs:    Clear bilaterally   Chest wall:    No tenderness or deformity   Heart:    Regular rate and rhythm, S1 and S2 normal   Abdomen:     Soft, non-tender, bowel sounds active      Extremities:    Musculoskeletal:  No peripheral edema    Wrist drop on the right      Skin:  Ecchymosis to right upper extremity   Neurologic:                            Behavior/  Emotional:  Exam limited by aphasia  Speech: Severe receptive and expressive aphasia.  Intermittently follows one-step commands.  Cannot follow two-step or embedded commands.  Unable to name a pen.  Cannot state her name.  Unable to repeat.  Unable to produce language with melodic intonation  MMT: Full strength in the bilateral lower extremities and left upper extremity.  Grossly 4 out of 5 strength at the shoulder and elbow flexors and extensors on the right.  Wrist extension, grasp, and finger extension all less than antigravity  Cranial Nerves: Full cranial nerve testing limited by aphasia.  Right facial droop         Impulsive     Labs  Results from last 7 days   Lab Units 06/28/23  0606 06/27/23  0624 06/26/23  1524   SODIUM mEQ/L 142 141 142   POTASSIUM mEQ/L 3.5 3.4* 4.1   CHLORIDE mEQ/L 112* 111* 111*   CO2 mEQ/L 19* 23 21*   BUN mg/dL 12 13 14   CREATININE mg/dL 0.7 0.8 0.8   CALCIUM mg/dL 8.8 9.0 9.7   ALBUMIN g/dL  --   --  4.6   BILIRUBIN TOTAL mg/dL  --   --  1.6*   ALK PHOS IU/L  --   --  68   ALT IU/L  --   --  16   AST IU/L  --   --  28   GLUCOSE mg/dL 104* 107* 117*       Results from last 7 days   Lab Units 07/01/23  0357 06/30/23  0235 06/29/23  0626   WBC K/uL 4.74 4.88 5.36   HEMOGLOBIN g/dL 11.2* 11.3* 12.4   HEMATOCRIT % 33.4* 34.3* 36.8   PLATELETS K/uL 125* 127* 142*         Imaging  CT Head stroke alert 6/26/23      IMPRESSION:  1. Findings highly concerning for an acute infarct in the posterior aspect of  the left MCA territory with an associated thrombosed vessel.  2. Chronic microangiopathy and involutional changes.    CT Head 6/27/23  IMPRESSION: Acute/subacute left MCA distribution infarct with 3 mm midline  shift.. No definite hemorrhage      Assessment/Plan     Plan of care was discussed with patient and One of her POA at bedside  Patient is being admitted to [facility] for comprehensive  inpatient rehabilitation to address functional deficits in mobility, transfers, ADL's and safety.   Patient will receive the following services: physical therapy, occupational therapy, speech therapy and nursing.  Physiatry oversight & Internal Medicine coverage will be provided for the following comorbidities: Comorbid Conditions in addition to those listed above heart block, glaucoma.  I have reviewed the pre-admission screening and there are no relevant changes.  Expected length of stay: 21 day(s)      Code Status: Full Code    Right wrist drop  Assessment & Plan  Splinting, contracture prevention    Follow-up exam  Assessment & Plan  PCP  Cardiology Dr. Lorenzo for her cardiac function and anticoagulation treatment  Neurology     Left ventricular apical thrombus  Assessment & Plan  Continue apixaban    Coronary artery disease involving native heart without angina pectoris  Assessment & Plan  Continue apixaban, PRN nitroglycerin    Glaucoma  Assessment & Plan  Continue eye drops    * Acute ischemic left MCA stroke (CMS/HCC)  Assessment & Plan  The patient is a 78-year-old  female with a history of AAA, heart block s/p cardiac pacemaker, glaucoma, hyperlipidemia, coronary artery disease, NSTEMI, who presented to [facility] on June 26 after she has not been seen by her friends for a few days.  Patient lives alone and when she was found by EMS she was confused and combative.  In the emergency department she was aphasic with right-sided weakness and hypertensive to the 190 systolic.  CT scan of the head revealed an acute infarct in left MCA with mild bleeding.  Echo showed an EF of 43% and a bubble study was negative.  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  Later this was transitioned to apixaban.  She was continued on statin for secondary prevention.  She required virtual shivani while in the hospital but this was not  effective.  She was evaluated by speech therapy and cleared for a soft and bite-size diet with moderately thick liquids.    The patient lives alone with no immediate family and her 2 friends are her power of .  They do not have the advance directive documents with them.    Physical and occupational therapies ordered for increase strength endurance and mobility, assess ADLs and need for adaptive equipment, family training.  SLP for cognition  Internal medicine consult to manage medical comorbidities.  24-hour rehab nursing for skin care, bowel and bladder management, functional mobility and education.  Case management organized team conferences and assist with discharge planning.  Precautions: Falls    Continue apixaban  Continue statin    Sleep - melatonin and trazodone  Safety - one-to-one continuous observation, low bed, lapbelt.  Wean restraints as able    Disposition -anticipate patient will need 24/7 supervision for safe discharge    Need POA to bring in advance directives regarding code status - full code for now            Ulises Chowdhury MD  7/1/2023    Post Admission Physician Evaluation    Jennifer Sams is admitted to [facility] for comprehensive inpatient rehabilitation for Stroke with functional deficits in cognitive function; communication; mobility; motor dysfunction; safety; self-care; other (see comments) (swallow). Patient is receiving the following services: speech language pathology; physical therapy; occupational therapy; medical consultative services; neuropsychologist; dysphagia management.    Active medical management is required for   Patient Active Problem List   Diagnosis   • Hypercholesterolemia   • Pseudoclaudication syndrome   • History of rheumatic fever as a child   • Glaucoma   • RSD (reflex sympathetic dystrophy)   • Raynaud's disease   • Osteoarthritis of multiple joints   • History of hip replacement, total, right   • GERD (gastroesophageal reflux  disease)   • Elevated blood pressure reading without diagnosis of hypertension   • Abdominal aortic aneurysm (AAA) without rupture (CMS/[facility])   • NSTEMI (non-ST elevated myocardial infarction) (CMS/[facility])   • Ischemic cardiomyopathy   • Status post insertion of drug eluting coronary artery stent   • Chest pain   • Coronary artery disease involving native coronary artery of native heart with angina pectoris (CMS/[facility])   • Mobitz type 2 second degree atrioventricular block   • Cardiac pacemaker   • SOB (shortness of breath)   • Epistaxis   • Dyspnea on exertion   • Chest pain on breathing   • Seasonal allergies   • Raynaud's phenomenon   • Chronic combined systolic and diastolic heart failure (CMS/[facility])   • Other headache syndrome   • Coronary artery disease involving native heart without angina pectoris   • Mixed hyperlipidemia   • Dizziness   • Lumbar degenerative disc disease   • Lumbar facet arthropathy   • Cerebrovascular accident (CVA), unspecified mechanism (CMS/[facility])   • Elevated blood pressure reading with diagnosis of hypertension   • Left ventricular apical thrombus   • Acute ischemic left MCA stroke (CMS/[facility])   • Follow-up exam   • Right wrist drop       Premorbid Function  Ambulation: independent  Transferring: independent  Toileting: independent  Bathing: independent  Dressing: independent  Eating: independent  IADLs: independent  Communication: understands/communicates without difficulty  Swallowing: swallows foods/liquids without difficulty  Baseline Diet/Method of Nutritional Intake: no diet restrictions  Past History of Dysphagia: No  Assistive Device/Animal Currently Used at Home: walker, front-wheeled  Prior Level of Function Comment: I PTA      Current Function  Mobility  Gait  Gerry, Gait: minimum assist (75% or more patient effort); 1 person assist  Assistive Device: none  Comment (Gait/Stairs): Impulsive, Latteral sway min A for support and balance.    Stairs  Comment: TBA as  tolerated    Wheelchair     Transfers  Bed Mobility  Bed Mobility Activities: left; supine to sit; sit to supine  Safety/Cues: maximal; impulsivity; sequencing  Assistive Device: bed rails; head of bed elevated  Comment: sitting up long sitting in bed but can be redirected.      Toilet Transfer  Transfer Technique: sit/stand  [place]: minimum assist (75% or more patient effort)  Safety/Cues: impulsivity; maximal  Assistive Device: none        Self Care  Bathing     Upper Body Dressing  Tasks: doff; don; hospital gown  [place]: moderate assist (50-74% patient effort)  Safety/Cues: moderate; sequencing; attention; compensatory techniques    Lower Body Dressing  Tasks: don; socks  [place]: minimum assist (75% or more patient effort)  Safety/Cues: moderate; attention  Adaptive Equipment: none  Comment: impulsively attempting to lift leg to adjust socks, requiring Mod A for stability/safety/balance    Grooming  Tasks: washes, rinses and dries hands  [place]: moderate assist (50-74% patient effort)  Safety/Cues: maximal; attention  Setup Assistance: obtain supplies  Adaptive Equipment: none  Comment: hands soiled, assist to ensure clean    Toileting  Tasks: adjust/manage clothing; perform bladder hygiene; perform bowel hygiene  [place]: minimum assist (75% or more patient effort)  Safety/Cues: [place]/one-handed technique; impulsivity; compensatory techniques; problem-solving; sequencing  Adaptive Equipment: none  Comment: cues to terminate tasks    Self-Feeding     Cognition  Affect/Mental Status: anxious  Behavioral Issues: overwhelmed easily  Orientation Status: unable/difficult to assess (aphasia present, responds to name)  Follows Commands: follows one-step commands; 50-74% accuracy; delayed response/completion; increased processing time needed; initiation impaired; repetition of directions required; verbal cues/prompting required  Cognitive Function: attention deficit; executive function  deficit; safety deficit  Attention Deficit: moderate deficit; focused/sustained attention; perseverates on recent task; distractible in noisy environment; alternating attention; concentration  Executive Function Deficit: severe deficit; judgment; planning/decision-making; self-monitoring/self-correction; impulse control; organization/sequencing  Safety Deficit: severe deficit; ability to follow commands; awareness of need for assistance; impulsivity; insight into deficits/self-awareness; judgment; problem-solving  Comment, Cognition: frustrated easily    Communication  Speech Intelligibility (Motor Speech): word level; phrase/sentence level  Word Level, Speech Intelligibility (Motor Speech): impaired; minimal impairment  Phrase/Sentence Level, Speech Intelligibility (Motor Speech): impaired; minimal impairment  Articulation (Motor Speech): imprecise articulation  Comment, Motor Speech Intervention: Verbal and oral apraxia  Follows Commands (Auditory Comprehension): 1-step command  Yes/No Questions (Auditory Comprehension): biographical/personal questions  Biographical/Personal Questions (Auditory Comprehension): 75-90% accuracy  Comment, Assessment (Auditory Comprehension): Pt unable to follow commands or answer simple yes/no questions      Risk for Complications  Constipation: Moderate  Falls: Moderate  Infection: Moderate      Expected Level of Function  Self-Care: Setup or clean-up assistance  Transfers: Setup or clean-up assistance  Locomotion: Setup or clean-up assistance  Communication: Setup or clean-up assistance  Social Cognition: Independent      Anticipated Discharge Plan  home with assistance;home with home health    I have reviewed the pre-admission screening and there are no relevant changes.    Expected length of stay: 21 days

## 2023-07-01 NOTE — PROGRESS NOTES
Room# 206W      Nutrition Note    Clinical Course: Patient is a 78 y.o. female who was admitted on 7/1/2023 with a diagnosis of Acute ischemic left MCA stroke (CMS/HCC) [I63.512].     Nutrition Interventions/ Recommendations:   1. Continue with SB6, MT2; Cardiac diet order as tolerated/textures per SLP  2. Provided with SB6 diet alternate menu for more choices       - pt refused lunch meal; RD ordered different meal choices that are picked with pt from the alternate menu  3. Recommend ONS if </50% PO (Boost pudding is out of stock, pt doesn't like Gelatein; no liquid ONS due to MT2 consistency)  4. Has onion allergy; preferences are eggs, beef, cranberry, tomato juices, sweets, chocolate flavored puddings  5. Please update PO intake% TID  and update weekly weights on nsg flowsheet  6. Monitor weight changes, skin, PO intake%, labs/lytes (replete prn)    Nutrition Risk Level 2    Past Medical History:   Diagnosis Date   • AAA (abdominal aortic aneurysm)    • Arthritis    • Elevated blood pressure reading without diagnosis of hypertension 4/19/2019   • Epistaxis 1/6/2020   • GERD (gastroesophageal reflux disease) 4/19/2019   • Glaucoma 4/19/2019   • Heart murmur    • Hiatal hernia    • History of hip replacement, total, right 4/19/2019    Right hip 9/ 2016 and revision 2017    • History of rheumatic fever as a child 4/19/2019   • Hypercholesterolemia 4/19/2019   • Ischemic cardiomyopathy 5/9/2019   • Kidney cysts    • Osteoarthritis of multiple joints 4/19/2019   • Osteoporosis    • Pacemaker 12/9/2019   • Raynaud's disease 4/19/2019   • RSD (reflex sympathetic dystrophy) 4/19/2019    Of left foot   • SOB (shortness of breath) 12/10/2019   • Status post insertion of drug eluting coronary artery stent 5/9/2019     Past Surgical History:   Procedure Laterality Date   • CHOLECYSTECTOMY OPEN     • CORONARY ANGIOPLASTY WITH STENT PLACEMENT  04/29/2019    of LAD   • CORONARY ANGIOPLASTY WITH STENT PLACEMENT  04/30/2019     of RCA   • DILATION AND CURETTAGE OF UTERUS     • EYE SURGERY      RIGHT CATARACT   • FOOT SURGERY Left    • JOINT REPLACEMENT Right 09/2016    total hip   • REVISION TOTAL HIP ARTHROPLASTY Right 2017   • TONSILLECTOMY              Dietary Orders   (From admission, onward)             Start     Ordered    07/01/23 1102  Adult Diet Soft and Bite Sized SB6; Mildly Thick MT2; Cardiac (Low Sodium/Low Fat); RD/LDN may adjust order  Diet effective now        References:    IDDSI Diet reference   Question Answer Comment   Diet Texture Soft and Bite Sized SB6    Fluid Consistency: Mildly Thick MT2    Other Restriction(s): Cardiac (Low Sodium/Low Fat)    Delegation of Authority. Diet orders written by PA/CRNPs may not be adjusted by RD/LDNs. RD/LDN may adjust order        07/01/23 1101                Reason for Assessment  Reason For Assessment: nurse/nurse practitioner consult (MST)  Diagnosis:  (stroke)    [facility] Nutrition Screen Tool  Has patient lost weight without trying?: 2-->Unsure  Has patient been eating poorly due to decreased appetite?: 0-->No  [facility] Nutrition Screen Score: 2    Nutrition/Diet History  Typical Food/Fluid Intake: PTA: 2 meals; cooks own meals  Diet Prior to Admission: regular  Food Preferences: alelrgy to onions, doesnt like yogurt; loves chocolate, tamia pudding, cranberry, tomato juice; like eggs and beef  Meal/Snack Patterns: will be served 3 meals here  Supplemental Drinks/Foods/Additives: none  Vitamin/Mineral/Herbal Supplements: none  Food Allergies: other (see comments) (onion allergy)  Factors Affecting Nutritional Intake:  (stroke s/s; dyspagia, textured diet)    Physical Findings  Overall Physical Appearance:  (normally nourished)  Gastrointestinal:  (WDL)  Skin:  (catherization site)    RETS18 Physical Appearance  Overall Physical Appearance:  (normally nourished)  Gastrointestinal:  (WDL)  Skin:  (catherization site)    Nutrition Order  Nutrition Order: meets nutritional  "requirements  Nutrition Order Comments: SB6, MT2, Cardiac    Anthropometrics  Height: 172.7 cm (5' 8\")    Wt Readings from Last 3 Encounters:   07/01/23 63.6 kg (140 lb 5 oz)   07/01/23 64 kg (141 lb)   03/28/23 63.5 kg (140 lb)       Weights (last 7 days)     Date/Time Weight    07/01/23 1102 63.6 kg (140 lb 5 oz)        Current Weight  Weight Method: Bed scale  Weight: 63.6 kg (140 lb 5 oz)    Ideal Body Weight (IBW)  Ideal Body Weight (IBW) (kg): 64.15  % Ideal Body Weight: 99.22    Usual Body Weight (UBW)  Usual Body Weight: 63.5 kg (140 lb)    Body Mass Index (BMI)  BMI (Calculated): 21.3  BMI Assessment: BMI 18.5-24.9: normal  Nutritional Status/Malnutrition: Does not meet criteria for malnutrition    Labs/Procedures/Meds  Lab Results Reviewed: reviewed  Lab Results Comments: 7/1: Cl 112H, Glucose 104H, RBC 3.62L, Hgb 11.2L, Hct 33.4L    CMP Results       06/28/23 06/27/23 06/26/23     0606 0624 1524     141 142    K 3.5 3.4 4.1    Cl 112 111 111    CO2 19 23 21    Glucose 104 107 117    BUN 12 13 14    Creatinine 0.7 0.8 0.8    Calcium 8.8 9.0 9.7    Anion Gap 11 7 10    AST -- -- 28    ALT -- -- 16    Albumin -- -- 4.6    EGFR >60.0 >60.0 >60.0         Comment for K at 1524 on 06/26/23: Results obtained on plasma. Plasma Potassium values may be up to 0.4 mEQ/L less than serum values. The differences may be greater for patients with high platelet or white cell counts.        Lab Results   Component Value Date    ALT 16 06/26/2023    AST 28 06/26/2023    ALKPHOS 68 06/26/2023    BILITOT 1.6 (H) 06/26/2023     No results found for: [id number]  Lab Results   Component Value Date    CALCIUM 8.8 06/28/2023     Lab Results   Component Value Date    WBC 4.74 07/01/2023    HGB 11.2 (L) 07/01/2023    HCT 33.4 (L) 07/01/2023    MCV 92.3 07/01/2023     (L) 07/01/2023     No results found for: IRON, TIBC, FERRITIN  Lab Results   Component Value Date    CHOL 178 06/26/2023    CHOL 163 03/31/2023    CHOL 156 " 02/04/2022     Lab Results   Component Value Date    HDL 71 06/26/2023    HDL 80 03/31/2023    HDL 71 02/04/2022     Lab Results   Component Value Date    LDLCALC 88 06/26/2023    LDLCALC 66 03/31/2023    LDLCALC 66 02/04/2022     Lab Results   Component Value Date    TRIG 93 06/26/2023    TRIG 87 03/31/2023    TRIG 107 02/04/2022     No results found for: CHOLHDL  Glucose Results       06/26/23     1524    HBG A1C 5.4    EST AVG GLUCOSE 108         Comment for EST AVG GLUCOSE at 1524 on 06/26/23: Estimate of average glucose concentration continuously over 24 hours for previous 2 to 3 months(Per ADA Recommendation).            • apixaban  5 mg oral BID   • cycloSPORINE  1 drop Both Eyes q12h ROBERTO   • [START ON 7/2/2023] pantoprazole  40 mg oral Daily   • rosuvastatin  20 mg oral Nightly       Diagnostic Tests/Procedures  Diagnostic Test/Procedure Reviewed: reviewed  Diagnostic Test/Procedures Comments: echocardiogram on June 27, 2023 reported left ventricle EF 43% and a localized apical aneurysm containing thrombus which most likely caused acute CVA    Medications  Pertinent Medications Reviewed: reviewed  Pertinent Medications Comments: Eliquis, Protonix, Crestor         Calorie Requirements  Estimated kCal Needs: Actual Body Weight  Estimated Calorie Need Method: kcal/kg  Calorie/kg Recommended: 25-30  Calorie Recommendations: 9015-4112      [place]-St. Jeor Equation  RMR ([place]-St. Jeor Equation): 1164.95       Protein Requirements  Recommended Dosing Weight (Estimated Protein Needs): Actual Body Weight  Est Protein Requirement Amount (gms/kg): 1.2-->1.2 gm protein (1.1 -1.2)  Protein Recommendations: 70-76    Fluid Requirements  Fluid Recommendation (mL): 25-30ml  Recommended Fluid Needs Dosing Weight: Actual Body Weight  Fluid Requirements (mL/day): 4344-8928  Aleksandr-Alayna Method (over 20 kg): 2772.9      PES  Statement: PES Statement  Nutrition Diagnosis: Swallowing Difficulty  Related To:: stroke  As  "Evidenced By:: dysphagia(s/s of stroke); textured diet EC7, MT2 diet order  Nutritional Needs Met?: Other (comment) (monitoring)       Clinical Comments:   Pt is visited for MST. Just arrived at [facility]. Transferred from [facility]. Was admitted with altered mental status. Lives alone. Has  dysphagia/stroke complications. Per RN: \"pt is unable to answer, global aphasia.\"  Has a friend at bedside. Nods to questions, not good historian. RN and RD was in the room and pt refused to eat her lunch. RD offered other choices from the SB6 alternate menu and pt seemed to agree to some choices. RD let the kitchen know, they will send pt a new tray. PO is not known yet, ONS can be introduced again considering texture in diet/thickness in liquids. Boost pudding is out of stock, pt doesn't like Gelatein; no liquid ONS due to MT2 consistency. Has onion allergy; preferences are eggs, beef, cranberry, tomato juices, sweets, chocolate flavored puddings. Came in weighing 140 lbs. Reported UBW is 140 lbs. Pt's wt has been stable with  140 lbs +/- since 2019. No visible wasting.       Goals:   1. To consume and tolerate >/75% of meals  2. Labs/lytes remain WDL      Monitor and Evaluation: Weight changes, skin, PO intake%, labs/lytes (replete prn)      Discussed with: Patient, friend of Pt, RN        Date: 07/01/23  Signature: Kenia Newman RD  "

## 2023-07-01 NOTE — PLAN OF CARE
Care Coordination Discharge Plan Note     Discharge Needs Assessment  Concerns to be Addressed: discharge planning  Current Discharge Risk:      Anticipated Discharge Plan  Anticipated Discharge Disposition: acute rehab/Inpatient Rehab Facility       Patient Choice  Offered/Gave Vendor List: no  Patient's Choice of Community Agency(s):           Discharge Barriers   Barriers to Discharge:    ---------------------------------------------------------------------------------------------------------------------    Interdisciplinary Discharge Plan Review:  Participants:     Concerns Comments:      Discharge Plan:   Disposition/Destination: Acute Care Facility - [facility] Hospital (Acute Care Facility)  Discharge Facility:   Destination - Admitted Since 6/26/2023     Service Provider Selected Services Address Phone Fax Patient Preferred Last Updated    [facility] [address] [place] [facility] [place] [address] [phone] -- Tanya Ortiz RN 6/30/2023 1438       Internal Comment last updated by Tanya Ortiz RN 6/30/2023 1438    Call main number [phone]                   Community Resources:      Discharge Transportation:  Is Out of Hospital DNR needed at Discharge: no  Does patient need discharge transport? Yes  Discharge Transportation Vendor: other (see comment)  Type of Transportation: basic life support  What day is the transport expected?: 07/01/23  What time is the transport expected?: 1000       SW notified RN of plan for d/c today at 10am.  SW notified admissions at [facility] of confirmed discharge and transport time of 10am.

## 2023-07-01 NOTE — CONSULTS
Internal Medicine Consult Note      Patient interviewed and examined  Attestation Notes: Face to face encounter completed      Jennifer Sams is a 78 y.o. female who was admitted for CVA   [facility].    Patient was referred by Dr. Haywood For Medical Evaluation and Management    Outside records reviewed. Pertinent radiology results reviewed. Pertinent lab results reviewed.      HPI  Ms Sams is a 78-year-old female with ischemic cardiomyopathy, heart block s/p PPM, CAD, AAA, hypertension, GERD who was admitted to [facility] 6/26 with altered mental status.  She was noted to be aphasic with right-sided weakness  Initial CT scan concerning for acute infarct in the posterior aspect of the left MCA territory with associated thrombosed vessel  Follow-up CT head revealed acute/subacute infarct in the left MCA with 3 mm midline shift, no definite hemorrhage  She was started on aspirin and heparin drip  Noted dysphagia, speech therapy recommends soft and bite-size diet with mildly thick liquids  Echocardiogram 6/27 reported LVEF 43%, localized apical aneurysm containing thrombus which most likely caused her acute CVA.  She was transitioned to Eliquis, her aspirin was discontinued  She continues on Crestor for dyslipidemia  She is now referred to [facility] for acute inpatient rehabilitation    Subjective   Ms Sams is awake, alert, in no acute distress  Seated in wheelchair, with her belongings on her lap, friend is at her bedside  Per her friend, she has been indicating that she does not want to be here, does not feel she needs to be here, wants to go home  Ms. Sams is aphasic and unable to meaningfully provide history, is inconsistently responding to questions  She does have apparent right facial weakness, right hemiparesis  She has no indication of pain or discomfort    Medications were reviewed with Ms. Sams, she was provided copy of list    Review of Systems  All other systems reviewed and  negative except as noted in the HPI.    Medical History:   Past Medical History:   Diagnosis Date   • AAA (abdominal aortic aneurysm)    • Arthritis    • Elevated blood pressure reading without diagnosis of hypertension 4/19/2019   • Epistaxis 1/6/2020   • GERD (gastroesophageal reflux disease) 4/19/2019   • Glaucoma 4/19/2019   • Heart murmur    • Hiatal hernia    • History of hip replacement, total, right 4/19/2019    Right hip 9/ 2016 and revision 2017    • History of rheumatic fever as a child 4/19/2019   • Hypercholesterolemia 4/19/2019   • Ischemic cardiomyopathy 5/9/2019   • Kidney cysts    • Osteoarthritis of multiple joints 4/19/2019   • Osteoporosis    • Pacemaker 12/9/2019   • Raynaud's disease 4/19/2019   • RSD (reflex sympathetic dystrophy) 4/19/2019    Of left foot   • SOB (shortness of breath) 12/10/2019   • Status post insertion of drug eluting coronary artery stent 5/9/2019       Surgical History:   Past Surgical History:   Procedure Laterality Date   • CHOLECYSTECTOMY OPEN     • CORONARY ANGIOPLASTY WITH STENT PLACEMENT  04/29/2019    of LAD   • CORONARY ANGIOPLASTY WITH STENT PLACEMENT  04/30/2019    of RCA   • DILATION AND CURETTAGE OF UTERUS     • EYE SURGERY      RIGHT CATARACT   • FOOT SURGERY Left    • JOINT REPLACEMENT Right 09/2016    total hip   • REVISION TOTAL HIP ARTHROPLASTY Right 2017   • TONSILLECTOMY         Allergies: Ace inhibitors, Atorvastatin, Brilinta [ticagrelor], Codeine, Dilaudid [hydromorphone], Morphine sulfate, Onion, and Oxycodone    Current Medications:  No current facility-administered medications for this encounter.       Social History:   Social History     Socioeconomic History   • Marital status: Single   Tobacco Use   • Smoking status: Never   • Smokeless tobacco: Never   Vaping Use   • Vaping Use: Never used   Substance and Sexual Activity   • Alcohol use: Not Currently   • Drug use: No   • Sexual activity: Defer     Social Determinants of Health     Financial  Resource Strain: Low Risk  (6/28/2023)    Overall Financial Resource Strain (CARDIA)    • Difficulty of Paying Living Expenses: Not hard at all   Food Insecurity: No Food Insecurity (6/27/2023)    Hunger Vital Sign    • Worried About Running Out of Food in the Last Year: Never true    • Ran Out of Food in the Last Year: Never true   Transportation Needs: No Transportation Needs (6/28/2023)    PRAPARE - Transportation    • Lack of Transportation (Medical): No    • Lack of Transportation (Non-Medical): No   Housing Stability: Unknown (6/28/2023)    Housing Stability Vital Sign    • Unable to Pay for Housing in the Last Year: No    • Unstable Housing in the Last Year: No       Family History:   Family History   Problem Relation Age of Onset   • Congenital heart disease Biological Mother         noted at autopsy   • Pulmonary fibrosis Biological Father    • Heart attack Paternal Grandfather        Objective     Vital signs in last 24 hours:  Temp:  [36.7 °C (98.1 °F)-36.9 °C (98.4 °F)] 36.8 °C (98.2 °F)  Heart Rate:  [59-93] 60  Resp:  [16-20] 16  BP: (129-163)/(57-71) 134/60    Physical Exam  Vitals and nursing note reviewed.   Constitutional:       General: She is not in acute distress.     Appearance: She is not ill-appearing.   HENT:      Head: Normocephalic and atraumatic.      Right Ear: External ear normal.      Left Ear: External ear normal.      Nose: Nose normal.      Mouth/Throat:      Mouth: Mucous membranes are moist.      Pharynx: Oropharynx is clear.   Eyes:      General: No scleral icterus.     Extraocular Movements: Extraocular movements intact.      Conjunctiva/sclera: Conjunctivae normal.      Pupils: Pupils are equal, round, and reactive to light.   Cardiovascular:      Rate and Rhythm: Normal rate and regular rhythm.   Pulmonary:      Effort: Pulmonary effort is normal. No respiratory distress.      Breath sounds: Normal breath sounds. No wheezing or rhonchi.   Abdominal:      General: Bowel sounds  are normal.      Palpations: Abdomen is soft.   Musculoskeletal:      Cervical back: Normal range of motion.      Right lower leg: No edema.      Left lower leg: No edema.   Skin:     General: Skin is warm and dry.   Neurological:      Mental Status: She is alert.      Comments: Aphasic, right facial weakness, right hemiparesis   Psychiatric:         Mood and Affect: Mood normal.         Labs   Lab results reviewed, discussed with patient  Lab Results   Component Value Date    WBC 4.74 07/01/2023    HGB 11.2 (L) 07/01/2023    HCT 33.4 (L) 07/01/2023     (L) 07/01/2023     06/28/2023    K 3.5 06/28/2023     (H) 06/28/2023    CREATININE 0.7 06/28/2023    BUN 12 06/28/2023    CO2 19 (L) 06/28/2023    MG 1.9 06/28/2023    CHOL 178 06/26/2023    TRIG 93 06/26/2023    HDL 71 06/26/2023    ALT 16 06/26/2023    AST 28 06/26/2023    TSH 2.94 04/27/2022    INR 1.1 06/28/2023    HGBA1C 5.4 06/26/2023         Imaging    Chest x-ray 6/26:  Impression:  No active disease in the chest    CT head stroke alert 6/26:  IMPRESSION:   1. Findings highly concerning for an acute infarct in the posterior aspect of the left MCA territory with an associated thrombosed vessel.   2. Chronic microangiopathy and involutional changes.    CTA head/neck 6/26, CT brain perfusion:  IMPRESSION:   1. Atherosclerosis at the carotid bifurcations with mild luminal narrowing measuring approximately 25% on the right and 20% on the left using NASCET criteria. Patent cervical carotid and vertebral arteries.   2. Termination of flow in an M3 branch of the left middle cerebral artery.   3. Intracranial atherosclerosis with significant narrowing involving the left V4 segment after the takeoff of the posterior inferior cerebellar artery. Mild irregularity of the basilar artery.   4. Completed infarct in the posterior aspect of the left MCA territory. No evidence of ischemic penumbra. rCBF <30% : 0 cc TMax > 6s: 2 cc Mismatch volume: 2 cc  Mismatch Ratio: NA   5. Additional chronic and incidental findings, as above. In order to accelerate response time, other vascular pathology including occlusions of more distal arteries are not completely evaluated on this examination.    CT head 6/26:  IMPRESSION:   Evolving left MCA territory infarct.  Small hyperdense foci within the infarct may reflect contrast staining from interval angiography versus punctate hemorrhage.  Short interval follow-up recommended.    CT head 6/27:  IMPRESSION:   Acute/subacute left MCA distribution infarct with 3 mm midline shift.. No definite hemorrhage      ECG/Telemetry    Transthoracic echo 6/27:  Left ventricle with normal dimensions and regional contraction abnormalities consistent with CAD in the LAD distribution: Localized apical infarction with aneurysm formation.  Paradoxical septal movement is consistent with ventricular pacing.  There is moderate left ventricular systolic and mild diastolic dysfunction.  LVEF 43% Left ventricular apical thrombus Left atrial pressure 14 mmHg There is a visual suggestion of mild aortic stenosis. (Doppler assessment of the aortic valve is suboptimal) Mild mitral regurgitation Normal right ventricle dimensions and systolic function: TAPSE 1.84 cm Mild tricuspid regurgitation PASP 34 mmHg Normal left atrial volume index No pericardial effusion or vegetations AS-V pacing Technically difficult study: No parasternal imaging obtained This study was performed with Definity contrast to optimize evaluation of regional and global left ventricular function No significant change from 3/28/2023 except for current demonstration of left ventricular apical thrombus        Assessment/Plan   78 y.o. female being consulted for medical evaluation and management    Patient Active Problem List    Diagnosis Date Noted   • Left ventricular apical thrombus 06/28/2023   • Cerebrovascular accident (CVA), unspecified mechanism (CMS/HCC) 06/26/2023   • Elevated blood  pressure reading with diagnosis of hypertension 06/26/2023   • Lumbar degenerative disc disease 10/20/2022   • Lumbar facet arthropathy 10/20/2022   • Mixed hyperlipidemia 04/21/2022   • Dizziness 04/21/2022   • Coronary artery disease involving native heart without angina pectoris 04/09/2022   • Chronic combined systolic and diastolic heart failure (CMS/[facility]) 02/17/2022   • Other headache syndrome 02/17/2022   • Seasonal allergies 12/13/2021   • Raynaud's phenomenon 04/16/2021   • Dyspnea on exertion 01/26/2021   • Chest pain on breathing 01/26/2021   • Epistaxis 01/06/2020   • SOB (shortness of breath) 12/10/2019   • Cardiac pacemaker 12/09/2019   • Mobitz type 2 second degree atrioventricular block 11/16/2019   • Coronary artery disease involving native coronary artery of native heart with angina pectoris (CMS/[facility]) 11/13/2019   • Chest pain 11/12/2019   • Ischemic cardiomyopathy 05/09/2019   • Status post insertion of drug eluting coronary artery stent 05/09/2019   • NSTEMI (non-ST elevated myocardial infarction) (CMS/[facility]) 04/26/2019   • Hypercholesterolemia 04/19/2019   • Pseudoclaudication syndrome 04/19/2019   • History of rheumatic fever as a child 04/19/2019   • Glaucoma 04/19/2019   • RSD (reflex sympathetic dystrophy) 04/19/2019   • Raynaud's disease 04/19/2019   • Osteoarthritis of multiple joints 04/19/2019   • History of hip replacement, total, right 04/19/2019   • GERD (gastroesophageal reflux disease) 04/19/2019   • Elevated blood pressure reading without diagnosis of hypertension 04/19/2019   • Abdominal aortic aneurysm (AAA) without rupture (CMS/[facility]) 04/19/2019       Neuro:  Left MCA CVA  -Initial CT with concern for possible bleed, repeat scan without hemorrhage but 3 mm midline shift  -Continue Eliquis, statin, blood pressure management for secondary prevention  -Consult neurology    Cardiovascular:  Echo 6/27 showing LVEF 43%, localized apical aneurysm containing thrombus, possible mild AAS, mild  "MR, normal RV, PASP 34  -Has been started on Eliquis, aspirin discontinued  Dysrhythmia, history of heart block, s/p [employer] pacemaker  -Follows with Dr. Carolina as outpatient  CAD  -NSTEMI 4/26/19, 3vCAD, s/p FRANK x2 LAD, FRANK x4 RCA, FRANK x1LCx OM2  Ischemic cardiomyopathy  -Previously recommended Entresto, but was cost prohibitive; valsartan also recommended but not started due to concern for dizziness    Pulmonary:  -Increased risk of aspiration due to dysphagia  -Maintain aspiration precautions  -Encourage use of incentive spirometry for prevention of atelectasis    Endocrine:  Dyslipidemia  -Continue Crestor, low-fat diet    Renal:  BUN/Cr 12/0.7 with GFR> 60  -Continue to monitor renal function, volume status    GI:  Dysphagia  -Soft and bite sized, mildly thick diet level  -Consult speech therapy  -Maintain aspiration precautions  KELLI/GI prophylaxis  -continue PPI therapy  Constipation  -Bowel program available as needed    :  Increased risk of urinary retention, UTI due to CVA  -Monitor for symptom complaint    Heme:  Anemia, normochromic, normocytic with normal RDW  -Monitor trend hemoglobin on apixaban  Thrombocytopenia  -Continue to monitor trend platelet count    F/E/N:  Soft and bite-size, mildly thick, cardiac diet  -Monitor electrolytes, hydration, nutritional status  -Increased risk of electrolyte abnormalities, dehydration due to altered diet  -Consult nutrition    Prophylaxis:  -Apixaban DVT prophylaxis  -PPI GI prophylaxis  -Spirometry for atelectasis  -Maintain fall, cardiac, aspiration precautions    Disposition  -Expected length of stay 2 weeks    I have queried the state Prescription Drug Monitoring Program [PDMP] and found \"No patient matching the search criteria submitted was identified\"    Adela Eugene MD  7/1/2023    "

## 2023-07-01 NOTE — PLAN OF CARE
Nutrition Note     Clinical Course: Patient is a 78 y.o. female who was admitted on 7/1/2023 with a diagnosis of Acute ischemic left MCA stroke (CMS/HCC) [I63.512].      Nutrition Interventions/ Recommendations:   1. Continue with SB6, MT2; Cardiac diet order as tolerated/textures per SLP  2. Provided with SB6 diet alternate menu for more choices       - pt refused lunch meal; RD ordered different meal choices that are picked with pt from the alternate menu  3. Recommend ONS if </50% PO (Boost pudding is out of stock, pt doesn't like Gelatein; no liquid ONS due to MT2 consistency)  4. Has onion allergy; preferences are eggs, beef, cranberry, tomato juices, sweets, chocolate flavored puddings  5. Please update PO intake% TID  and update weekly weights on nsg flowsheet  6. Monitor weight changes, skin, PO intake%, labs/lytes (replete prn)     Nutrition Risk Level 2

## 2023-07-01 NOTE — ASSESSMENT & PLAN NOTE
Dispo: 7/14 home with assist, home with HC before transition to OP      PCP  Cardiology Dr. Lorenzo for her cardiac function and anticoagulation treatment  Neurology   PCP

## 2023-07-02 ENCOUNTER — APPOINTMENT (INPATIENT)
Dept: PHYSICAL THERAPY | Facility: REHABILITATION | Age: 78
DRG: 057 | End: 2023-07-02
Payer: MEDICARE

## 2023-07-02 ENCOUNTER — APPOINTMENT (INPATIENT)
Dept: OCCUPATIONAL THERAPY | Facility: REHABILITATION | Age: 78
DRG: 057 | End: 2023-07-02
Payer: MEDICARE

## 2023-07-02 ENCOUNTER — APPOINTMENT (INPATIENT)
Dept: SPEECH THERAPY | Facility: REHABILITATION | Age: 78
DRG: 057 | End: 2023-07-02
Payer: MEDICARE

## 2023-07-02 LAB
ALBUMIN SERPL-MCNC: 3.4 G/DL (ref 3.5–5.7)
ALP SERPL-CCNC: 51 IU/L (ref 34–104)
ALT SERPL-CCNC: 9 IU/L (ref 7–52)
ANION GAP SERPL CALC-SCNC: 8 MEQ/L (ref 3–15)
AST SERPL-CCNC: 22 IU/L (ref 13–39)
BILIRUB SERPL-MCNC: 0.8 MG/DL (ref 0.3–1)
BUN SERPL-MCNC: 6 MG/DL (ref 7–25)
CALCIUM SERPL-MCNC: 8.1 MG/DL (ref 8.6–10.3)
CHLORIDE SERPL-SCNC: 109 MEQ/L (ref 98–107)
CO2 SERPL-SCNC: 24 MEQ/L (ref 21–31)
CREAT SERPL-MCNC: 0.6 MG/DL (ref 0.6–1.2)
ERYTHROCYTE [DISTWIDTH] IN BLOOD BY AUTOMATED COUNT: 12.8 % (ref 11.7–14.4)
FOLATE SERPL-MCNC: >20 NG/ML
GFR SERPL CREATININE-BSD FRML MDRD: >60 ML/MIN/1.73M*2
GLUCOSE SERPL-MCNC: 87 MG/DL (ref 70–99)
HCT VFR BLDCO AUTO: 34.6 % (ref 35–45)
HGB BLD-MCNC: 11.4 G/DL (ref 11.8–15.7)
MAGNESIUM SERPL-MCNC: 1.8 MG/DL (ref 1.9–2.7)
MCH RBC QN AUTO: 30.3 PG (ref 28–33.2)
MCHC RBC AUTO-ENTMCNC: 32.9 G/DL (ref 32.2–35.5)
MCV RBC AUTO: 92 FL (ref 83–98)
PDW BLD AUTO: 9.3 FL (ref 9.4–12.3)
PLATELET # BLD AUTO: 140 K/UL (ref 150–369)
POTASSIUM SERPL-SCNC: 3 MEQ/L (ref 3.5–5.1)
PROT SERPL-MCNC: 5.1 G/DL (ref 6.4–8.9)
RBC # BLD AUTO: 3.76 M/UL (ref 3.93–5.22)
SODIUM SERPL-SCNC: 141 MEQ/L (ref 136–145)
VIT B12 SERPL-MCNC: 182 PG/ML (ref 180–914)
WBC # BLD AUTO: 4.83 K/UL (ref 3.8–10.5)

## 2023-07-02 PROCEDURE — 97535 SELF CARE MNGMENT TRAINING: CPT | Mod: GO

## 2023-07-02 PROCEDURE — 80053 COMPREHEN METABOLIC PANEL: CPT | Performed by: HOSPITALIST

## 2023-07-02 PROCEDURE — 36415 COLL VENOUS BLD VENIPUNCTURE: CPT | Performed by: HOSPITALIST

## 2023-07-02 PROCEDURE — 85027 COMPLETE CBC AUTOMATED: CPT | Performed by: HOSPITALIST

## 2023-07-02 PROCEDURE — 63700000 HC SELF-ADMINISTRABLE DRUG: Performed by: HOSPITALIST

## 2023-07-02 PROCEDURE — 97167 OT EVAL HIGH COMPLEX 60 MIN: CPT | Mod: GO

## 2023-07-02 PROCEDURE — 63700000 HC SELF-ADMINISTRABLE DRUG: Performed by: STUDENT IN AN ORGANIZED HEALTH CARE EDUCATION/TRAINING PROGRAM

## 2023-07-02 PROCEDURE — 92523 SPEECH SOUND LANG COMPREHEN: CPT | Mod: GN

## 2023-07-02 PROCEDURE — 82607 VITAMIN B-12: CPT | Performed by: HOSPITALIST

## 2023-07-02 PROCEDURE — 83735 ASSAY OF MAGNESIUM: CPT | Performed by: HOSPITALIST

## 2023-07-02 PROCEDURE — 97163 PT EVAL HIGH COMPLEX 45 MIN: CPT | Mod: GP

## 2023-07-02 PROCEDURE — 12800000 HC ROOM AND CARE SEMIPRIVATE REHAB

## 2023-07-02 PROCEDURE — 82746 ASSAY OF FOLIC ACID SERUM: CPT | Performed by: HOSPITALIST

## 2023-07-02 RX ORDER — POTASSIUM CHLORIDE 750 MG/1
40 TABLET, EXTENDED RELEASE ORAL 2 TIMES DAILY
Status: COMPLETED | OUTPATIENT
Start: 2023-07-02 | End: 2023-07-02

## 2023-07-02 RX ADMIN — PANTOPRAZOLE SODIUM 40 MG: 40 TABLET, DELAYED RELEASE ORAL at 08:44

## 2023-07-02 RX ADMIN — APIXABAN 5 MG: 5 TABLET, FILM COATED ORAL at 20:40

## 2023-07-02 RX ADMIN — CYCLOSPORINE 1 DROP: 0.5 EMULSION OPHTHALMIC at 20:40

## 2023-07-02 RX ADMIN — TRAZODONE HYDROCHLORIDE 25 MG: 50 TABLET, FILM COATED ORAL at 20:40

## 2023-07-02 RX ADMIN — POTASSIUM CHLORIDE 40 MEQ: 750 TABLET, EXTENDED RELEASE ORAL at 08:45

## 2023-07-02 RX ADMIN — CYCLOSPORINE 1 DROP: 0.5 EMULSION OPHTHALMIC at 08:44

## 2023-07-02 RX ADMIN — POTASSIUM CHLORIDE 40 MEQ: 750 TABLET, EXTENDED RELEASE ORAL at 20:40

## 2023-07-02 RX ADMIN — ROSUVASTATIN CALCIUM 20 MG: 20 TABLET, FILM COATED ORAL at 20:40

## 2023-07-02 RX ADMIN — Medication 3 MG: at 20:40

## 2023-07-02 RX ADMIN — APIXABAN 5 MG: 5 TABLET, FILM COATED ORAL at 08:44

## 2023-07-02 NOTE — PLAN OF CARE
Plan of Care Review  Plan of Care Reviewed With: patient  Progress: improving  Outcome Evaluation: Patient is awake alert and aphasic. Patient denies pain/discomfort. Patient tolerated  medications without difficulty. Patient slept well during the night. 1:1 at  bedside, safety precautions maintained.

## 2023-07-02 NOTE — PLAN OF CARE
Problem: Rehabilitation (IRF) Plan of Care  Goal: Plan of Care Review  Flowsheets (Taken 7/2/2023 1253)  Outcome Evaluation: PT IE completed  Goal: Patient-Specific Goal (Individualized)  Flowsheets (Taken 7/2/2023 1253)  Patient/Family-Specific Goals (Include Timeframe): Pt unable to state current goals at this time  Individualized Care Needs: Pt loves animals, particularly cats, and would benefit from additional therapies incuding rec, art, music, pet groun, hort center etc.

## 2023-07-02 NOTE — PLAN OF CARE
Problem: Rehabilitation (IRF) Plan of Care  Goal: Plan of Care Review  7/2/2023 1515 by Daniela Corrales OT  Outcome: Progressing  7/2/2023 1515 by Daniela Corrales OT  Outcome: Progressing  Flowsheets (Taken 7/2/2023 1515)  Progress: improving  Plan of Care Reviewed With: patient  Outcome Evaluation: OT eval completed, POC established.  Goal: Patient-Specific Goal (Individualized)  Outcome: Progressing  Flowsheets (Taken 7/2/2023 1515)  Patient/Family-Specific Goals (Include Timeframe): Pt unable to state current goals at this time.

## 2023-07-02 NOTE — HOSPITAL COURSE
History of Present Illness  Jennifer is a 78 y.o. female admitted on 7/1/2023 with Acute ischemic left MCA stroke (CMS/HCC) [I63.512]. Principal problem is Acute ischemic left MCA stroke (CMS/HCC).    The patient is a 78-year-old  female with a history of AAA, heart block s/p cardiac pacemaker, glaucoma, hyperlipidemia, coronary artery disease, NSTEMI, who presented to [facility] on June 26 after she has not been seen by her friends for a few days.  Patient lives alone and when she was found by EMS she was confused and combative.  In the emergency department she was aphasic with right-sided weakness and hypertensive to the 190 systolic.  CT scan of the head revealed an acute infarct in left MCA with mild bleeding.  Echo showed an EF of 43% and a bubble study was negative.  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  Later this was transitioned to apixaban.  She was continued on statin for secondary prevention.  She was evaluated by speech therapy and cleared for a soft and bite-size diet with moderately thick liquids.    7/2/2023:  Diet SB6 with mildly thick liquids (MT2)  7/6/2023: Upgraded to EC7 with thin liquids    Past Medical History  Jennifer has a past medical history of AAA (abdominal aortic aneurysm), Arthritis, Elevated blood pressure reading without diagnosis of hypertension (4/19/2019), Epistaxis (1/6/2020), GERD (gastroesophageal reflux disease) (4/19/2019), Glaucoma (4/19/2019), Heart murmur, Hiatal hernia, History of hip replacement, total, right (4/19/2019), History of rheumatic fever as a child (4/19/2019), Hypercholesterolemia (4/19/2019), Ischemic cardiomyopathy (5/9/2019), Kidney cysts, Osteoarthritis of multiple joints (4/19/2019), Osteoporosis, Pacemaker (12/9/2019), Raynaud's disease (4/19/2019), RSD (reflex sympathetic dystrophy) (4/19/2019), SOB (shortness of breath) (12/10/2019), and Status  post insertion of drug eluting coronary artery stent (5/9/2019).

## 2023-07-02 NOTE — PLAN OF CARE
Problem: Rehabilitation (IRF) Plan of Care  Goal: Plan of Care Review  Outcome: Progressing  Flowsheets (Taken 7/2/2023 1548)  Plan of Care Reviewed With: patient  Outcome Evaluation:   Pt presents with severe expressive and receptive aphasia and apraxia characterized by anomia, non fluent speech, unable to repeat single words   difficulty following 1 step commands   responding to y/n questions consistently   Pt with very limited intelligible verbal output   Pt's overall receptive language is better than expressive langauge.  [place] [employer] score 18/67 (below cut score 52).  Pt with mild-mod oropharyngeal dysphagia  Diet SB6 with MT2     Problem: Rehabilitation (IRF) Plan of Care  Goal: Patient-Specific Goal (Individualized)  Flowsheets (Taken 7/2/2023 8295)  Patient/Family-Specific Goals (Include Timeframe): Pt unable to state current goals at this time due to language deficits

## 2023-07-02 NOTE — PROGRESS NOTES
Patient: Jennifer Sams  Location: [facility] [address]  MRN: [id number]  Today's date: 7/2/2023    History of Present Illness  Jennifer is a 78 y.o. female admitted on 7/1/2023 with Acute ischemic left MCA stroke (CMS/HCC) [I63.512]. Principal problem is Acute ischemic left MCA stroke (CMS/HCC).    The patient is a 78-year-old  female with a history of AAA, heart block s/p cardiac pacemaker, glaucoma, hyperlipidemia, coronary artery disease, NSTEMI, who presented to [facility] on June 26 after she has not been seen by her friends for a few days.  Patient lives alone and when she was found by EMS she was confused and combative.  In the emergency department she was aphasic with right-sided weakness and hypertensive to the 190 systolic.  CT scan of the head revealed an acute infarct in left MCA with mild bleeding.  Echo showed an EF of 43% and a bubble study was negative.  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  Later this was transitioned to apixaban.  She was continued on statin for secondary prevention.  She was evaluated by speech therapy and cleared for a soft and bite-size diet with moderately thick liquids.    7/2/2023:  Diet SB6 with mildly thick liquids (MT2)    Past Medical History  Jennifer has a past medical history of AAA (abdominal aortic aneurysm), Arthritis, Elevated blood pressure reading without diagnosis of hypertension (4/19/2019), Epistaxis (1/6/2020), GERD (gastroesophageal reflux disease) (4/19/2019), Glaucoma (4/19/2019), Heart murmur, Hiatal hernia, History of hip replacement, total, right (4/19/2019), History of rheumatic fever as a child (4/19/2019), Hypercholesterolemia (4/19/2019), Ischemic cardiomyopathy (5/9/2019), Kidney cysts, Osteoarthritis of multiple joints (4/19/2019), Osteoporosis, Pacemaker (12/9/2019), Raynaud's disease (4/19/2019), 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        SLP Pain    Date/Time Pain Type Rating: Rest Rating: Activity [facility]   07/02/23 1001 Pain Assessment 0 - no pain 0 - no pain AFL   07/02/23 1056 Pain Reassessment 0 - no pain 0 - no pain AFL          Prior Living Environment    Flowsheet Row Most Recent Value   People in Home alone   Current Living Arrangements home   Home Accessibility stairs to enter home (Group), stairs within home (Group)   Living Environment Comment Per chart review, pt lives alone in a 2SH with 1STE   Number of Stairs, Main Entrance 1   Number of Stairs, Within Home, Primary 12  [Full fight to second floor]          Prior Level of Function    Flowsheet Row Most Recent Value   Dominant Hand --  [Unable to determine 2/2 cognitive/communication deficits]   Ambulation independent   Transferring independent   Toileting independent   Bathing independent   Dressing independent   Eating independent   IADLs independent   Communication understands/communicates without difficulty   Swallowing swallows foods/liquids without difficulty   Baseline Diet/Method of Nutritional Intake no diet restrictions   Past History of Dysphagia no   Prior Level of Function Comment Per chart review: PMH SB6,  MT2,  No VFSS in EMR.  Pt seen by SLP at [place].   Assistive Device Currently Used at Home --  [Per chart review pt has RW at home - unclear if pt was using AD and pt unable to state PLOF at this time]           formerly [facility] SLP Evaluation and Treatment - 07/02/23 1001        SLP Time Calculation    Start Time 1000     Stop Time 1100     Time Calculation (min) 60 min        Session Details    Document Type Initial Evaluation     Mode of Treatment individual therapy;speech language pathology        General Information    Patient Profile Reviewed yes     General Observations of Patient Pt recieved from 1:1 upright in  wheelchair.     Existing Precautions/Restrictions fall;aspiration;modified diet;restraints     Limitations/Impairments safety/cognitive;swallowing        Cognition/Psychosocial    Behavioral Issues overwhelmed easily     Orientation Status unable/difficult to assess;other (see comments)   unable to assess due to language deficits    Attention Deficit minimal deficit     Comment, Attention Pt attended to 60 min session in quiet environment with mild redirection.     Safety Deficit impulsivity        Orientation Log    Comment unable to complete due to severity of language deficits        Hearing Assessment    Hearing Status WFL        Dentition (Oral Motor)    Dentition (Oral Motor) adequate dentition        Facial Symmetry (Oral Motor)    Facial Symmetry (Oral Motor) right side impairment     Right Side Facial Asymmetry minimal impairment;moderate impairment        Lip Function (Oral Motor)    Protrusion, Lip Range of Motion right side;minimal impairment     Retraction, Lip Range of Motion right side;minimal impairment;moderate impairment        Tongue Function (Oral Motor)    Lateralization, Tongue ROM Impairment (Oral Motor) right side;minimal impairment     Tongue Coordination/Speed (Oral Motor) reduced rate        Jaw Function (Oral Motor)    Jaw Function (Oral Motor) WNL        Cough/Swallow/Gag Reflex (Oral Motor)    Soft Palate/Velum (Oral Motor) unable/difficult to assess     Volitional Throat Clear/Cough (Oral Motor) unable to produce     Volitional Swallow (Oral Motor) unable to initiate        Vocal Quality/Secretion Management (Oral Motor)    Vocal Quality (Oral Motor) WFL     Secretion Management (Oral Motor) WNL     Comment, Vocal Quality/Secretion Management (Oral Motor) noted inconsistent anterior leakage with thins; ice chips        GRBAS    Grade 0-->none     Roughness 0-->none     Breathiness 0-->none     Asthenia 0-->none     Strain 0-->none        Motor Speech    Speech Intelligibility (Motor  Speech) word level     Word Level, Speech Intelligibility (Motor Speech) minimal impairment     Articulation (Motor Speech) imprecise articulation     Comment, Motor Speech Intervention Verbal and oral apaxia noted        Auditory Comprehension    Follows Commands (Auditory Comprehension) 1-step command     1 Step, Follows Commands (Auditory Comprehension) 25-49% accuracy     Yes/No Questions (Auditory Comprehension) biographical/personal questions     Biographical/Personal Questions (Auditory Comprehension) 75-90% accuracy     Object Identification (Auditory Comprehension) field of 2     Field of 2, Object Identification (Auditory Comprehension) impaired;0-24% accuracy     Picture Identification (Auditory Comprehension) field of 2 pictures     Field of 2, Picture Identification (Auditory Comprehension) impaired     Comment, Assessment (Auditory Comprehension) [facility]: Aud comprehension 16/24 (simple y/n 8/12; simple commands 2/4, id pictures by description 6/6; id object function 0/2)        Verbal Expression    Automatic Speech (Verbal Expression) counting     Counting, Automatic Speech (Verbal Expression) impaired;unsuccessful with cues     Confrontational Naming (Verbal Expression) objects     Objects, Confrontational Naming (Verbal Expression) impaired;unsuccessful with cues     Responsive Naming (Verbal Expression) sentence completion     Sentence Completion, Responsive Naming (Verbal Expression) impaired;unsuccessful with cues     Word Finding Skills (Verbal Expression) unable/difficult to assess     Repetition Skills (Verbal Expression) words     Words, Repetition Skills (Verbal Expression) impaired;unsuccessful with cues     Conversational Speech (Verbal Expression) connected speech     Connected Speech, Conversational (Verbal Expression) severe impairment     Comment, Assessment (Verbal Expression) Pt non fluent with noted anomia; unable to repeat words; however pt can hum melodic lines ie  happy birthday;  Apraxia noted.[employer] Verbal expression 0/19        Reading Comprehension    Comment, Assessment (Reading Comprehension) [employer] Reading subtests:0/4 (object ot word matching/single word reading)        Written Language    Comment, Assessment (Written Language) [facility] Writing subtest 0/4 (drawing completion, simple copying, first name).  Pt was however able ot trace simple shapes 3/3; grossly trace first name; trace a single letter accurately        Communication Enhancement    Supportive Environment (Communication Strategies) minimize frustration;calm/relaxed environment        General Swallowing Observations    Current Diet/Method of Nutritional Intake mildly thick liquids (MT2);thin liquids;pureed (PU4);soft and bite-sized (SB6)     Signs/Symptoms of Aspiration (Current Diet) cough     Respiratory Support (General Swallowing Observations) none        Food and Liquid Trials (NIS)    Patient Positioning upright in wheelchair     Oral Intake/Feeding Performance set-up of food/liquid needed;control for amount/rate of intake needed     Liquid Consistencies Evaluated mildly thick liquids (MT2);thin liquids     Thin Liquids impaired;multiple consecutive cup sips;single cup sips;other (see comments)     Comment, Thin Liquids ice chip x 2; cup sips with cues for small sip     Mildly Thick Liquids (MT2) patient-controlled amounts     Comment, Mildly Thick Liquids (MT2) no overt s/s of aspiration x 4 sips; cued for small sips however pt can be impulsive at times and take larger sips     Pureed (PU4) WFL     Soft and Bite-Sized (SB6) cued single bites/sips     Oral Preparatory Phase of Swallow mild impairment;mouth closure around utensil/cup decreased;lip seal impaired;bolus cohesion decreased;loss of bolus/oral leakage, right side     Oral Phase of Swallow mild impairment     Comment, Oral Phase Attempted small piece of olman cracker with peanut butter; licked peanut butter off  cracker and refused to eat cracker; canned peaches in bite sized amounts with liquid drained from spoon; slow yet effective mastication of solid; anterior oral loss x 1     Pharyngeal Phase of Swallow coughing after swallow     Comment, Pharyngeal Phase on overt s/s of aspiration with MT2, puree and SB6; noted delayed cough following sip of thin liquid.     Esophageal Phase of Swallow other (see comments)   pt with history of GERD    Comment Pt requires cues for small bites and sips; pt with limited PO intake per EMR; Encourage pt with SB6 and alternate choices.        Swallowing Recommendations    Diet Consistency Recommendations soft and bite-sized (SB6);mildly thick liquids (MT2)     Medication Administration crushed with pureed     Supervision Level for Intake 1:1 supervision needed     Feeding/Delivery Recommendations allow patient to feed self if maintaining safety;check mouth frequently for oral residue/pocketing;eliminate all distractions;single cup sips only;small bites/sips;stop meal if showing signs of aspiration or fatigue (e.g., coughing, wet voice)     Posture Recommendations fully upright in chair/chair mode of bed     Swallowing Strategies alternate food and liquid intake     Instrumental Assessment Recommendations --   Discuss with primary need for FEES in coming 1-2 weeks    Comment, Swallowing Recommendations Frequent oral care;        Functional Communication Measures    FCM: Reading 2-->Level 2     FCM: Spoken Language, Comprehension 4-->Level 4     FCM: Spoken Language, Expression 2-->Level 2     FCM: Swallowing 4-->Level 4     FCM: Writing 2-->Level 2        IRF SLP Goals    Exercise Goal Selection oral exercise, SLP goal 1;oral exercise, SLP goal 2        Patient/Family Goals (SLP)    Patient's Goals For Discharge other (see comments)   pt unable to state goals due to language deficits       Therapy Assessment/Plan (SLP)    Functional Level at Time of Evaluation (SLP) Severe  expressive>receptive aphasia; apraxia, mild-mod oral-pharyngeal dysphagia     SLP Diagnosis Pt presents with 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     Swallowing Diagnosis mild;moderate;oropharyngeal phase dysphagia     Dysphagia Characteristics delayed coughing observed with thin liquids; mild anterior loss with both solids and liquids; mildly prolonged mastication of solids.     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  [employer] score 18/67 (below cut score 52).  Pt with mild-mod oropharyngeal dysphagia        Daily Progress Summary (SLP)    Daily Outcome Statement Pt presents with 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  [employer] score 18/67 (below cut score 52).  Pt with  mild-mod oropharyngeal dysphagia     Symptoms Noted During/After Treatment none                 Education Documentation  Treatment Plan, taught by Jemima Miramontes CCC-SLP at 7/2/2023  3:47 PM.  Learner: Patient  Readiness: Acceptance  Method: Explanation  Response: Needs Reinforcement  Comment: Discussed speech therapy and frequency as well as language and swallowing goals with patient    Diet Modification, taught by Jemima Miramontes CCC-SLP at 7/2/2023  3:46 PM.  Learner: Patient  Readiness: Acceptance  Method: Explanation  Response: Needs Reinforcement  Comment: Discussed MT2 SB6 diet level with patient.  Explained reason why diet level is modified.    Diet Modification, taught by Jemima Miramontes CCC-SLP at 7/2/2023  3:45 PM.  Learner: Patient  Readiness: Eager  Method: Explanation  Response: Needs Reinforcement  Comment: Discussed current diet level with patient SB6 and MT2.  Discussed reasons for modified diet;          IRF SLP Goals    Flowsheet Row Most Recent Value   Auditory Comprehension Goal 1    Auditory Comprehension Goal 1 pt will complete basic Aud comprehension tasks (simple yes/no 75%),  1 step commands 50% with max cues,  match word heard to picture f/o 2 50% max cues at 07/02/2023 1001   Time Frame short-term goal (STG), 1 week at 07/02/2023 1001   Auditory Comprehension Goal 2    Auditory Comprehension Goal 2 Pt will understand mod-complex level y/n questions 90% min cues,  2 step commands 75% min-mod cues,  at 07/02/2023 1001   Time Frame long-term goal (LTG), 4 weeks at 07/02/2023 1001   Verbal Expression Goal 1    Verbal Expression Goal 1 Pt will complete basic VE tasks (name. automatics, simple opposites, naming) with 20% accuracy and max cues. at 07/02/2023 1001   Time Frame short-term goal (STG), 1 week at 07/02/2023 1001   Verbal Expression Goal 2    Verbal Expression Goal 2 Pt will complete Verbal expression tasks (basic-mod) with 50-75% accuracy and min-mod cues.  Pt  will imitate vowels 50% max cues,  at 07/02/2023 1001   Time Frame long-term goal (LTG), 4 weeks at 07/02/2023 1001   Reading Comprehension Goal 1    Reading Comprehension Goal 1 Pt will match word to picture field of 2 with 50% accuracy and mod-max cues,  Pt will read single functional words with 20% accuracy max cues at 07/02/2023 1001   Time Frame short-term goal (STG), 1 week at 07/02/2023 1001   Reading Comprehension Goal 2    Reading Comprehension Goal 2 Pt will read simple sentences and comprehend simple sentences with 75% accuracy min-mod cues at 07/02/2023 1001   Time Frame long-term goal (LTG) at 07/02/2023 1001   Written Expression Goal 1    Written Expression Goal 1 Pt will copy first name, shapes, letters and 3-4 letter words with 80% min cues,  pt will copy letters, name shapes with 50% accuracy and mod cues. at 07/02/2023 1001   Time Frame short-term goal (STG), 1 week at 07/02/2023 1001   Written Expression Goal 2    Written Expression Goal 2 Pt will write first and last name,  single words 90% accuracy at 07/02/2023 1001   Oral Nutrition/Hydration Goal 1    Activity effective/safe, use of swallowing techniques, management of texture/viscosity, other (see comments)  [SB6,  MT2.  Trials of thins and various textures by speech with min s/s of aspiration] at 07/02/2023 1001   Time Frame short-term goal (STG), 14 days or less at 07/02/2023 1001   Oral Nutrition/Hydration Goal 2    Activity effective/safe, oral nutrition/hydration, use of swallowing techniques, management of texture/viscosity, other (see comments)  [regular and thins] at 07/02/2023 1001   Time Frame long-term goal (LTG), 4 weeks at 07/02/2023 1001   Oral Motor Exercise Goal 1    Activity perform oral motor strengthening exercises, facial/cheek, lip, tongue, strength/ROM, mastication/bolus manipulation, 5-10 times per session, other (see comments)  [use mirror/visual model] at 07/02/2023 1001   Ware with 1:1 supervision/constant  cues at 07/02/2023 1001   Time Frame short-term goal (STG), 14 days or less at 07/02/2023 1001   Oral Motor Exercise Goal 2    Activity perform oral motor strengthening exercises, facial/cheek, lip, tongue, strength/ROM, mastication/bolus manipulation, other (see comments)  [2-3 times a day sets of 10-15] at 07/02/2023 1001   [place] with minimal cues (75-90% accuracy) at 07/02/2023 1001   Time Frame long-term goal (LTG), 4 weeks at 07/02/2023 1001

## 2023-07-02 NOTE — PROGRESS NOTES
Patient: Jennifer Sams  Location: [facility] [address]  MRN: [id number]  Today's date: 7/2/2023    History of Present Illness  Jennifer is a 78 y.o. female admitted on 7/1/2023 with Acute ischemic left MCA stroke (CMS/HCC) [I63.512]. Principal problem is Acute ischemic left MCA stroke (CMS/HCC).    The patient is a 78-year-old  female with a history of AAA, heart block s/p cardiac pacemaker, glaucoma, hyperlipidemia, coronary artery disease, NSTEMI, who presented to [facility] on June 26 after she has not been seen by her friends for a few days.  Patient lives alone and when she was found by EMS she was confused and combative.  In the emergency department she was aphasic with right-sided weakness and hypertensive to the 190 systolic.  CT scan of the head revealed an acute infarct in left MCA with mild bleeding.  Echo showed an EF of 43% and a bubble study was negative.  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  Later this was transitioned to apixaban.  She was continued on statin for secondary prevention.  She was evaluated by speech therapy and cleared for a soft and bite-size diet with moderately thick liquids.    Past Medical History  Jennifer has a past medical history of AAA (abdominal aortic aneurysm), Arthritis, Elevated blood pressure reading without diagnosis of hypertension (4/19/2019), Epistaxis (1/6/2020), GERD (gastroesophageal reflux disease) (4/19/2019), Glaucoma (4/19/2019), Heart murmur, Hiatal hernia, History of hip replacement, total, right (4/19/2019), History of rheumatic fever as a child (4/19/2019), Hypercholesterolemia (4/19/2019), Ischemic cardiomyopathy (5/9/2019), Kidney cysts, Osteoarthritis of multiple joints (4/19/2019), Osteoporosis, Pacemaker (12/9/2019), Raynaud's disease (4/19/2019), RSD (reflex sympathetic dystrophy) (4/19/2019), SOB (shortness of breath)  (12/10/2019), and Status post insertion of drug eluting coronary artery stent (5/9/2019).      OT Vitals    Date/Time Pulse HR Source BP BP Location BP Method Pt Position [facility]   07/02/23 0737 60 Monitor 170/71 Left upper arm Automatic Lying [place]   07/02/23 0740 -- -- 147/70 Left upper arm Manual Sitting [place]   07/02/23 0828 61 Monitor 155/70 Left upper arm Manual Sitting [place]      OT Pain    Date/Time Pain Type Rating: Rest Rating: Activity [facility]   07/02/23 0737 Pain Assessment 0 - no pain 0 - no pain [place]   07/02/23 0828 Pain Reassessment 0 - no pain 0 - no pain AK          Prior Living Environment    Flowsheet Row Most Recent Value   People in Home alone   Current Living Arrangements home   Home Accessibility stairs to enter home (Group), stairs within home (Group)   Living Environment Comment Per chart review, pt lives alone in a 2SH with 1STE   Number of Stairs, Main Entrance 1   Number of Stairs, Within Home, Primary 12  [Full fight to second floor]          Prior Level of Function    Flowsheet Row Most Recent Value   Dominant Hand --  [Unable to determine 2/2 cognitive/communication deficits]   Ambulation independent   Transferring independent   Toileting independent   Bathing independent   Dressing independent   Eating independent   IADLs independent   Communication understands/communicates without difficulty   Swallowing swallows foods/liquids without difficulty   Baseline Diet/Method of Nutritional Intake no diet restrictions   Past History of Dysphagia no   Prior Level of Function Comment Per chart review: PMH SB6,  MT2,  No VFSS in EMR.  Pt seen by SLP at [place].   Assistive Device Currently Used at Home --  [Per chart review pt has RW at home - unclear if pt was using AD and pt unable to state PLOF at this time]          Occupational Profile    Flowsheet Row Most Recent Value   Occupational History/Life Experiences Will need to clarify PLOF/driving status. Pt unable to state 2/2 cognitive/communication deficits.  "  Environmental Supports and Barriers Per chart review, pt lives alone but has supportive friends.   Patient Goals Pt unable to state goal 2/2 communication deficits.           IRF OT Evaluation and Treatment - 07/02/23 0711        OT Time Calculation    Start Time 0730     Stop Time 0815     Time Calculation (min) 45 min        Session Details    Document Type Initial Evaluation     Mode of Treatment occupational therapy;individual therapy        General Information    General Observations of Patient Pt received lying supine in bed, asleep. Direct handoff pre/post session to 1:1.     Existing Precautions/Restrictions fall;aspiration;modified diet;restraints   mildly thick/soft & bite. Lap belt, 1:1       Mobility Belt    Mobility Belt Used for All Out of Bed Activity yes        Prior Level of Function    IADLs independent        Living Environment    Primary Care Provided by self        Cognition/Psychosocial    Safety Deficit impulsivity     Comment, Cognition Pt verbalizing \"hi\" and occasional \"yes\" t/o OT eval. Communication significantly impaired 2/2 global aphasia. Pt able to follow one step commands ~50% of the time. Pt demo some understanding 2/2 making appropriate facial expressions and nonverbal gestures t/o.        Vision Assessment/Intervention    Vision Assessment --   Pt c glasses in pt room. Unsure of need for distance/reading/full-time. Vision screen to be completed as able 2/2 communication/command follow deficits.    Isabel Bergego Scale (CBS) Right Neglect        Isabel Bergego Scale (CBS): Right Neglect    Grooming 2 - moderate neglect     Dressing 1 - mild neglect     Mouth Cleaning 3 - severe neglect     Limb Knowledge 1 - mild neglect     Gaze Orientation 1 - mild neglect     Auditory Attention 1 - mild neglect     Adjusted Score 15        Sensory Assessment    Left UE Sensory Assessment unable/difficult to assess   difficult to assess 2/2 cognitive/communication deficits    Right UE " Sensory Assessment unable/difficult to assess   difficult to assess 2/2 cognitive/communication deficits       Range of Motion (ROM)    Left Upper Extremity (ROM) left UE ROM is WFL     Right Upper Extremity (ROM) right UE ROM is WFL except;wrist;hand     Wrist, Right (ROM) 0     Hand, Right (ROM) 0     Comment: Range of Motion R UE ROM WFL proximally and impaired distally - no AROM at wrist/hand/digits        Strength (Manual Muscle Testing)    Left Upper Extremity Strength left UE strength is WFL   Appears to be WFL - difficulty to formally assess 2/2 cognitive/communication deficits.    Right Upper Extremity Strength right UE strength is WFL except;wrist;hand   Appears to be WFL proximally. Impaired distally at wrist/hand. No AROM at wrist/hand/digits. Difficulty to formally assess 2/2 cognitive/communication deficits.    Wrist, Right (Strength) 0     Hand, Right (Strength) 0        Bed Mobility    Comment OT: Supine to sit EOB c Cl S.        Sit to Stand Transfer    [place], Sit to Stand Transfer touching/steadying assist     Safety/Cues impulsivity     Assistive Device none     Comment From EOB, w/c and DME surfaces.        Stand to Sit Transfer    [place], Stand to Sit Transfer touching/steadying assist     Safety/Cues impulsivity     Assistive Device none     Comment To EOB, w/c and DME surfaces.        Stand Pivot Transfer    [place], Stand Pivot/Stand Step Transfer touching/steadying assist     Safety/Cues impulsivity;technique     Assistive Device none     Comment Via amb approach s AD c Steadying A 2/2 unsteadiness and impulsivity/decreased safety awareness.        Toilet Transfer    Transfer Technique stand pivot     [place] touching/steadying assist     Safety/Cues impulsivity;technique;hand placement     Assistive Device grab bars/safety frame     Comment Via amb approach to accessible height toilet c Steadying A and use of grab bars.        Shower Transfer    Comment Unsafe to  complete without OT intervention, however likely Steadying A s AD via amb approach onto shower chair in barrier free shower stall.        Wheelchair Mobility/Management    Comment, Wheelchair Mobility OT ordered R UE padded lap tray for w/c arm rest to support R UE and increase R UE awareness/attention.        Impairments/Safety Issues    Cognitive Impairments, Safety/Performance impulsivity;insight into deficits/self-awareness;problem-solving/reasoning     Comment, Safety Issues/Impairments OT: Short HHM completed within pt room to/from bathroom c Steadying A/Min A s AD.        Balance    Comment, Balance OT: Steadying A/Min A for supported/unsupported standing balance within context of ADL.        Motor Skills    Coordination right;upper extremity;severe impairment;fine motor deficit        Bathing    Shower Provided? Yes     Tasks chest;right arm;abdomen;front perineal area;buttocks;left upper leg;right upper leg;left lower leg, including foot;right lower leg, including foot     [place] minimum assist (75% or more patient effort)     Safety/Cues maximal;verbal cues;tactile cues;impulsivity;compensatory techniques;attention;malaika/one-handed technique;sequencing     Position unsupported sitting;unsupported standing     Setup Assistance adaptive equipment setup;adjust water temperature/flow;obtain supplies;open containers     Adaptive Equipment shower chair;grab bar/tub rail;hand-held shower spray hose     Comment Pt required Max multimodal cues to follow command to sit on shower chair during shower to optimize safety. Pt completed shower while seated on shower chair and standing c Steadying A/Min A for balance. Assist required to bathe/dry L UE. Max VCs for impulsivity, safety awraeness and sequencing.        Upper Body Dressing    Tasks don;pull over garment     [place] minimum assist (75% or more patient effort)     Safety/Cues impulsivity;malaika/one-handed technique;attention;sequencing     Position edge  of bed sitting     Adaptive Equipment none     Comment Completed while seated EOB, Min A overall - assist c threading R UE.        Lower Body Dressing    Tasks don;pants/bottoms;underwear;socks     [place] minimum assist (75% or more patient effort)     Safety/Cues impulsivity;malaika/one-handed technique;sequencing     Position edge of bed sitting;unsupported standing     Adaptive Equipment none     [place], Footwear minimum assist (75% or more patient effort)     Comment Pt able to thread B LE into underwear/pants while seated EOB. Pt then stands to manage over hips c Steadying A/Min A for balance and Min A to assist minimally managing up on R side. Pt able to doff socks on own via cross-legged technique. Pt able to don L sock, assist required to don R sock. Min A overall footwear.        Grooming    Tasks washes, rinses and dries face;brushes/[place]is hair     [place] touching/steadying assist     Safety/Cues impulsivity;attention     Position supported standing;unsupported standing     Setup Assistance obtain supplies     Adaptive Equipment none     Comment Pt stood at sink to wash face and comb hair c Steadying A. Unsafe to complete oral hygiene prior to SLP recommendation 2/2 modified diet.        Toileting    Tasks adjust/manage clothing;perform bladder hygiene     [place] minimum assist (75% or more patient effort)     Safety/Cues impulsivity;sequencing;malaika/one-handed technique     Position unsupported sitting;unsupported standing     Adaptive Equipment none     Comment Continent of urine while seated on toilet. Steadying A/Min A for balance while in stance for clothing management.        Therapy Assessment/Plan (OT)    Functional Level at Time of Evaluation (OT) ~Steadying A amb level transfers s AD and Min A for ADL completion     Rehab Potential/Prognosis (OT) good, to achieve stated therapy goals     Frequency of Treatment (OT) 5-7 times per week;60-90 minutes per day     Estimated  Duration of Therapy (OT) 2 weeks     Planned Therapy Interventions adaptive equipment training;BADL retraining;cognitive retraining;functional balance retraining;IADL retraining;neuromuscular control/coordination retraining;manual therapy/joint mobilization;occupation/activity based interventions;orthotic fabrication/fitting/training;passive ROM/stretching;patient/caregiver education/training;ROM/therapeutic exercise;strengthening exercise;transfer/mobility retraining;visual perception retraining     Comment, Therapy Assessment/Plan (OT) Pt is a 77 y/o female admitted s/p L MCA CVA. Per chart review, PTA pt was previously I with all ADL/IADLs and was living alone in a 2SH. Per chart review, pt does not have any immediate family but has supportive friends. Will need further clarification on PLOF, driving status and home setup. Upon OT eval, pt completes ADLs c ~Min A and amb level transfers c ~Steadying A s AD. Pt is limited by cognitive and communication deficits (global aphasia). Pt presents c decreased safety awareness, impulsivity, distractibility, decreased command following and R inattention. Pt c no active movement at R wrist/hand/digitis, however appears WFL proximally at elbow/shoulder. Recommend inpt OT services to maximize functional independence for safe d/c.                      Education Documentation  Orientation to Care Setting, Routine, taught by Daniela Corrales OT at 7/2/2023  3:16 PM.  Learner: Patient  Readiness: Acceptance  Method: Explanation  Response: Needs Reinforcement  Comment: Pt edu on purpose of OT, inpt rehab setting, daily therapy schedule and call bell use.    Admission, Transition of Care, taught by Daniela Corrales OT at 7/2/2023  3:16 PM.  Learner: Patient  Readiness: Acceptance  Method: Explanation  Response: Needs Reinforcement  Comment: Pt edu on purpose of OT, inpt rehab setting, daily therapy schedule and call bell use.          IRF OT Goals    Flowsheet Row Most Recent  Value   Transfer Goal 1    Activity/Assistive Device toilet at 07/02/2023 0711   [place] --  [Cl S] at 07/02/2023 0711   Time Frame short-term goal (STG), 5 - 7 days at 07/02/2023 0711   Transfer Goal 2    Activity/Assistive Device toilet at 07/02/2023 0711   [place] supervision required at 07/02/2023 0711   Time Frame long-term goal (LTG), 14 days or less at 07/02/2023 0711   Transfer Goal 3    Activity/Assistive Device shower at 07/02/2023 0711   [place] --  [Cl S] at 07/02/2023 0711   Time Frame short-term goal (STG), 5 - 7 days at 07/02/2023 0711   Transfer Goal 4    Activity/Assistive Device shower at 07/02/2023 0711   [place] supervision required at 07/02/2023 0711   Time Frame long-term goal (LTG), 14 days or less at 07/02/2023 0711   Bathing Goal 1    [place] --  [Steadying A] at 07/02/2023 0711   Time Frame short-term goal (STG), 5 - 7 days at 07/02/2023 0711   Bathing Goal 2    [place] supervision required at 07/02/2023 0711   Time Frame long-term goal (LTG), 14 days or less at 07/02/2023 0711   UB Dressing Goal 1    [place] --  [Steadying A] at 07/02/2023 0711   Time Frame short-term goal (STG), 5 - 7 days at 07/02/2023 0711   UB Dressing Goal 2    [place] set-up required at 07/02/2023 0711   Time Frame long-term goal (LTG), 14 days or less at 07/02/2023 0711   LB Dressing Goal 1    [place] --  [Steadying A] at 07/02/2023 0711   Time Frame short-term goal (STG), 5 - 7 days at 07/02/2023 0711   LB Dressing Goal 2    [place] supervision required at 07/02/2023 0711   Time Frame long-term goal (LTG), 14 days or less at 07/02/2023 0711   Grooming Goal 1    [place] --  [TBA] at 07/02/2023 0711   Grooming Goal 2    [place] set-up required at 07/02/2023 0711   Time Frame long-term goal (LTG), 14 days or less at 07/02/2023 0711   Toileting Goal 1    [place] --  [Steadying A] at 07/02/2023 0711   Time Frame short-term goal (STG), 5 - 7 days at  07/02/2023 0711   Toileting Goal 2    [place] supervision required at 07/02/2023 0711   Time Frame long-term goal (LTG), 14 days or less at 07/02/2023 0711

## 2023-07-02 NOTE — PROGRESS NOTES
Patient: Jennifer Sams  Location: [facility] [address]  MRN: [id number]  Today's date: 7/2/2023    History of Present Illness  Jennifer is a 78 y.o. female admitted on 7/1/2023 with Acute ischemic left MCA stroke (CMS/HCC) [I63.512]. Principal problem is Acute ischemic left MCA stroke (CMS/HCC).    The patient is a 78-year-old  female with a history of AAA, heart block s/p cardiac pacemaker, glaucoma, hyperlipidemia, coronary artery disease, NSTEMI, who presented to [facility] on June 26 after she has not been seen by her friends for a few days.  Patient lives alone and when she was found by EMS she was confused and combative.  In the emergency department she was aphasic with right-sided weakness and hypertensive to the 190 systolic.  CT scan of the head revealed an acute infarct in left MCA with mild bleeding.  Echo showed an EF of 43% and a bubble study was negative.  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  Later this was transitioned to apixaban.  She was continued on statin for secondary prevention.  She was evaluated by speech therapy and cleared for a soft and bite-size diet with moderately thick liquids.    Past Medical History  Jennifer has a past medical history of AAA (abdominal aortic aneurysm), Arthritis, Elevated blood pressure reading without diagnosis of hypertension (4/19/2019), Epistaxis (1/6/2020), GERD (gastroesophageal reflux disease) (4/19/2019), Glaucoma (4/19/2019), Heart murmur, Hiatal hernia, History of hip replacement, total, right (4/19/2019), History of rheumatic fever as a child (4/19/2019), Hypercholesterolemia (4/19/2019), Ischemic cardiomyopathy (5/9/2019), Kidney cysts, Osteoarthritis of multiple joints (4/19/2019), Osteoporosis, Pacemaker (12/9/2019), Raynaud's disease (4/19/2019), RSD (reflex sympathetic dystrophy) (4/19/2019), SOB (shortness of breath)  (12/10/2019), and Status post insertion of drug eluting coronary artery stent (5/9/2019).      OT Vitals    Date/Time Pulse HR Source BP BP Location BP Method Pt Position [facility]   07/02/23 0828 61 Monitor 155/70 Left upper arm Manual Sitting [place]      OT Pain    Date/Time Pain Type Rating: Rest Rating: Activity [facility]   07/02/23 0828 Pain Reassessment 0 - no pain 0 - no pain AK          Prior Living Environment    Flowsheet Row Most Recent Value   People in Home alone   Current Living Arrangements home   Home Accessibility stairs to enter home (Group), stairs within home (Group)   Living Environment Comment Per chart review, pt lives alone in a 2SH with 1STE   Number of Stairs, Main Entrance 1   Number of Stairs, Within Home, Primary 12  [Full fight to second floor]          Prior Level of Function    Flowsheet Row Most Recent Value   Dominant Hand --  [Unable to determine 2/2 cognitive/communication deficits]   Ambulation independent   Transferring independent   Toileting independent   Bathing independent   Dressing independent   Eating independent   IADLs independent   Communication understands/communicates without difficulty   Swallowing swallows foods/liquids without difficulty   Baseline Diet/Method of Nutritional Intake no diet restrictions   Past History of Dysphagia no   Prior Level of Function Comment Per chart review: PMH SB6,  MT2,  No VFSS in EMR.  Pt seen by SLP at [place].   Assistive Device Currently Used at Home --  [Per chart review pt has RW at home - unclear if pt was using AD and pt unable to state PLOF at this time]          Occupational Profile    Flowsheet Row Most Recent Value   Occupational History/Life Experiences Will need to clarify PLOF/driving status. Pt unable to state 2/2 cognitive/communication deficits.   Environmental Supports and Barriers Per chart review, pt lives alone but has supportive friends.   Patient Goals Pt unable to state goal 2/2 communication deficits.           IRF OT  Evaluation and Treatment - 07/02/23 0815        OT Time Calculation    Start Time 0815     Stop Time 0830     Time Calculation (min) 15 min        Session Details    Document Type Daily Treatment/Progress Note     Mode of Treatment occupational therapy;individual therapy        General Information    General Observations of Patient OT intervention/DME introduction necessary for safe shower transfer.        Mobility Belt    Mobility Belt Used for All Out of Bed Activity yes        Shower Transfer    Transfer Technique stand pivot     [place] touching/steadying assist     Safety/Cues impulsivity;technique;hand placement     Assistive Device shower chair;grab bars/tub rail     Comment Via amb approach c Steadying A s AD onto shower chair in barrier free shower stall.        Daily Progress Summary (OT)    Daily Outcome Statement OT intervention/DME introduction necessary for safe shower transfer.                           IRF OT Goals    Flowsheet Row Most Recent Value   Transfer Goal 1    Activity/Assistive Device toilet at 07/02/2023 0711   [place] --  [Cl S] at 07/02/2023 0711   Time Frame short-term goal (STG), 5 - 7 days at 07/02/2023 0711   Transfer Goal 2    Activity/Assistive Device toilet at 07/02/2023 0711   [place] supervision required at 07/02/2023 0711   Time Frame long-term goal (LTG), 14 days or less at 07/02/2023 0711   Transfer Goal 3    Activity/Assistive Device shower at 07/02/2023 0711   [place] --  [Cl S] at 07/02/2023 0711   Time Frame short-term goal (STG), 5 - 7 days at 07/02/2023 0711   Transfer Goal 4    Activity/Assistive Device shower at 07/02/2023 0711   [place] supervision required at 07/02/2023 0711   Time Frame long-term goal (LTG), 14 days or less at 07/02/2023 0711   Bathing Goal 1    [place] --  [Steadying A] at 07/02/2023 0711   Time Frame short-term goal (STG), 5 - 7 days at 07/02/2023 0711   Bathing Goal 2    [place] supervision required at  07/02/2023 0711   Time Frame long-term goal (LTG), 14 days or less at 07/02/2023 0711   UB Dressing Goal 1    [place] --  [Steadying A] at 07/02/2023 0711   Time Frame short-term goal (STG), 5 - 7 days at 07/02/2023 0711   UB Dressing Goal 2    [place] set-up required at 07/02/2023 0711   Time Frame long-term goal (LTG), 14 days or less at 07/02/2023 0711   LB Dressing Goal 1    [place] --  [Steadying A] at 07/02/2023 0711   Time Frame short-term goal (STG), 5 - 7 days at 07/02/2023 0711   LB Dressing Goal 2    [place] supervision required at 07/02/2023 0711   Time Frame long-term goal (LTG), 14 days or less at 07/02/2023 0711   Grooming Goal 1    [place] --  [TBA] at 07/02/2023 0711   Grooming Goal 2    [place] set-up required at 07/02/2023 0711   Time Frame long-term goal (LTG), 14 days or less at 07/02/2023 0711   Toileting Goal 1    [place] --  [Steadying A] at 07/02/2023 0711   Time Frame short-term goal (STG), 5 - 7 days at 07/02/2023 0711   Toileting Goal 2    [place] supervision required at 07/02/2023 0711   Time Frame long-term goal (LTG), 14 days or less at 07/02/2023 0711

## 2023-07-02 NOTE — PLAN OF CARE
Plan of Care Review  Plan of Care Reviewed With: patient  Progress: improving  Outcome Evaluation: Alert, aphasic and impulsive particularly around transfers. Mood has improved from admission with a decrease in general frustration. Poor appetite, numerous food choices offered, minimal success with beef options, and sweet potato. Continent of bowel and bladder, denies discomfort, and participating in therapy as scheduled. Shower provided by OT this AM.

## 2023-07-02 NOTE — PROGRESS NOTES
Patient: Jennifer Sams  Location: [facility] [address]  MRN: [id number]  Today's date: 7/2/2023    History of Present Illness  Jennifer is a 78 y.o. female admitted on 7/1/2023 with Acute ischemic left MCA stroke (CMS/HCC) [I63.512]. Principal problem is Acute ischemic left MCA stroke (CMS/HCC).    The patient is a 78-year-old  female with a history of AAA, heart block s/p cardiac pacemaker, glaucoma, hyperlipidemia, coronary artery disease, NSTEMI, who presented to [facility] on June 26 after she has not been seen by her friends for a few days.  Patient lives alone and when she was found by EMS she was confused and combative.  In the emergency department she was aphasic with right-sided weakness and hypertensive to the 190 systolic.  CT scan of the head revealed an acute infarct in left MCA with mild bleeding.  Echo showed an EF of 43% and a bubble study was negative.  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  Later this was transitioned to apixaban.  She was continued on statin for secondary prevention.  She was evaluated by speech therapy and cleared for a soft and bite-size diet with moderately thick liquids.    Past Medical History  Jennifer has a past medical history of AAA (abdominal aortic aneurysm), Arthritis, Elevated blood pressure reading without diagnosis of hypertension (4/19/2019), Epistaxis (1/6/2020), GERD (gastroesophageal reflux disease) (4/19/2019), Glaucoma (4/19/2019), Heart murmur, Hiatal hernia, History of hip replacement, total, right (4/19/2019), History of rheumatic fever as a child (4/19/2019), Hypercholesterolemia (4/19/2019), Ischemic cardiomyopathy (5/9/2019), Kidney cysts, Osteoarthritis of multiple joints (4/19/2019), Osteoporosis, Pacemaker (12/9/2019), Raynaud's disease (4/19/2019), RSD (reflex sympathetic dystrophy) (4/19/2019), SOB (shortness of breath)  (12/10/2019), and Status post insertion of drug eluting coronary artery stent (5/9/2019).      PT Vitals    Date/Time Pulse HR Source BP BP Location BP Method Pt [facility]   07/02/23 1121 66 Left Radial 148/78 Left upper arm Manual Sitting [facility]   07/02/23 1158 -- -- 154/74 Left upper arm Manual Sitting [facility]      PT Pain    Date/Time Pain Type Rating: Rest Rating: [facility]   07/02/23 1121 Pain Assessment 0 - no pain -- [facility]   07/02/23 1158 Pain Assessment 0 - no pain 0 - no pain [facility]          Prior Living Environment    Flowsheet Row Most Recent Value   People in Home alone   Current Living Arrangements home   Home Accessibility stairs to enter home (Group), stairs within home (Group)   Living Environment Comment Per chart review, pt lives alone in a 2SH with 1STE   Number of Stairs, Main Entrance 1   Number of Stairs, Within Home, Primary 12  [Full fight to second floor]          Prior Level of Function    Flowsheet Row Most Recent Value   Ambulation independent   Transferring independent   Toileting independent   Bathing independent   Dressing independent   Eating independent   IADLs independent   Communication understands/communicates without difficulty   Swallowing swallows foods/liquids without difficulty   Baseline Diet/Method of Nutritional Intake no diet restrictions   Past History of Dysphagia no   Prior Level of Function Comment Per chart review: PMH SB6,  MT2,  No VFSS on file   Assistive Device Currently Used at Home --  [Per chart review pt has RW at home - unclear if pt was using AD and pt unable to state PLOF at this time]           IRF PT Evaluation and Treatment - 07/02/23 0830        PT Time Calculation    Start Time 1100     Stop Time 1200     Time Calculation (min) 60 min        Session Details    Document Type Initial Evaluation     Mode of Treatment physical therapy;individual therapy        General Information    Patient Profile Reviewed yes     General Observations of Patient Pt received seated  "upright in w/c in room - direct hand off from 1:1     Existing Precautions/Restrictions fall;aspiration;modified diet;restraints   Mildly thick/soft + bite sized; Lap belt; 1:1       Mobility Belt    Mobility Belt Used for All Out of Bed Activity yes        Prior Level of Function    IADLs independent        Living Environment    Primary Care Provided by self        Cognition/Psychosocial    Orientation Status unable/difficult to assess   Pt only using \"yes/no\" as her responses with variable accuracy. Difficult to direct during activity but able to demonstrate some understanding when PT talking about animals, while reading signs around the hallways, or via facial expressions       Sensory Assessment (Somatosensory)    Left LE Sensory Assessment unable/difficult to assess   Unable to formally assess due to cognition and communication deficits    Right LE Sensory Assessment unable/difficult to assess   Unable to formally assess due to cognition and communication deficits       Range of Motion (ROM)    Left Lower Extremity (ROM) left LE ROM is WFL     Right Lower Extremity (ROM) right LE ROM is WFL        Strength (Manual Muscle Testing)    Left Lower Extremity Strength left LE strength is WFL   Appears WFL in a functional context - unable to formally assess due to cognition and communication deficits    Right Lower Extremity Strength right LE strength is WFL   Appears WFL in a functional context - unable to formally assess due to cognition and communication deficits       Bed Mobility    [place], Roll Left close supervision     [place], Roll Right close supervision     [place], Supine to Sit close supervision     [place], Sit to Supine close supervision     Comment Performed in pt's hospital bed; CSup due to impulsivity and decreased safety awareness        Transfers    Transfers stand pivot transfer     Comment Mobilty belt donned t/o duration of session        Bed to Chair Transfer    " [place], Bed to Chair touching/steadying assist     Safety/Cues impulsivity;technique     Assistive Device --   none    Comment SPT between EOB and w/c; steadying assist for balance        Chair to Bed Transfer    [place], Chair to Bed touching/steadying assist     Safety/Cues impulsivity;technique     Assistive Device none     Comment SPT between w/c and EOB; steadying assist for balance        Sit to Stand Transfer    [place], Sit to Stand Transfer touching/steadying assist     Safety/Cues impulsivity     Assistive Device none     Comment From w/c and EOB; steadying assist due to unsteadiness, impulsivity, and decreased safety awareness        Stand to Sit Transfer    [place], Stand to Sit Transfer touching/steadying assist     Safety/Cues impulsivity;increased time to complete     Assistive Device none     Comment To w/c; steadying assist due to unsteadiness and decreased safety awareness; pt frequently not wanting to return to chair and requires increaed time- and cueing to return to a seated position        Stand Pivot Transfer    [place], Stand Pivot/Stand Step Transfer touching/steadying assist     Safety/Cues impulsivity;technique     Assistive Device none     Comment Via ambulatory approach; steadying assist due to unsteadiness, impulsivity, and decreased safety awareness        Car Transfer    [facility] [place] unable to assess     Comment Unable to assess due to environmental limitations - due to impaired safey awareness, distractability, impulsivity, and difficulty following commands, pt stayed on unit t/o session        Gait Training    [place], Gait touching/steadying assist     Safety/Cues impulsivity;technique     Assistive Device none     Distance in Feet 200 feet     Pattern step-through     Deviations/Abnormal Patterns base of support, narrow;gait speed decreased;step length decreased;bilateral deviations     Bilateral Gait Deviations --   Trunk sway; intermittent  "variability in foot placement at IC    [place], Picking Up Object touching/steadying assist     Comment (Gait/Stairs) 2x200' without AD; PT steadying assist via gait belt, holding pt's hand, or hooking arms together; mild lateral trunk sway and variability in foot placement B/L. Overall unsteadiness noted; impulsive at times with decreased safety awareness; cueing and guidance to assist with environmental navigation. Pt ambulating around hallways with frequent stops to look at/explore the paintings, signs, wave to staff etc.        Curb Negotiation    [place] touching/steadying assist     Assistive Device none     Curb Height 6 inches     Comment Up/down 6+2\" curb without AD; TA demo to assist with pt completing task        Rough/Uneven Surface Gait Skills    [place] touching/steadying assist     Assistive Device none     Distance in Feet 10 feet     Comment Up/down 5' indoor ramp; TA demo to assist with pt completing task        Stairs Training    [place], Stairs dependent (less than 25% patient effort)     Safety/Cues impulsivity;technique     Assistive Device railing     Handrail Location (Stairs) left side (ascending);left side (descending)     Number of Stairs 16     Stair Height 7 inches     Ascending Stairs Technique step-over-step     Descending Stairs Technique step-over-step     Comment Navigation of 16 7\" stairs with U/L HR assist; steadying assist x1 with VCs for safety; impuslivity noted + 2nd person to help manage entering/leaving stairs due to distractability        Wheelchair Mobility/Management    Method of Locomotion bipedal (lower extremity) propulsion;malaika propulsion, left sided     Wheelchair Type manual, lightweight     [place], Forward Propulsion moderate assist (50-74% patient effort)     [place], Steering Activities moderate assist (50-74% patient effort)     [place], Turning Activities moderate assist (50-74% patient effort)     Safety/Cues technique   " demo    Distance Propelled in Feet 15 feet     Comment, Wheelchair Mobility Pt attempting to self propel w/c with BLE and LUE - chair height too high to propel with BLE; PT ModA while pt attempting to self-propel. Jorge-height chair order to be placed to allow pt to self-propel w/c with 1:1 to assist with high activity level, affect, and mood        Impairments/Safety Issues    Cognitive Impairments, Safety/Performance impulsivity;insight into deficits/self-awareness;problem-solving/reasoning     Functional Endurance Good        Balance    Static Sitting Balance WFL;unsupported;sitting, edge of bed     Dynamic Sitting Balance WFL;unsupported;sitting, edge of bed     Static Standing Balance mild impairment;unsupported;standing     Dynamic Standing Balance mild impairment;unsupported;standing     Comment, Balance No AD use; overall mild unsteadiness noted in stance. Pt self-initiating ball toss - TA assist to toss ball back/forth, PT steadying assist        Motor Skills    Coordination other (see comments)   Unable to formally assess due to impaired cognition and command following; mild RLE impairement noted in a functional context    Muscle Tone WNL;bilateral;lower extremity(s)        Postural Deviations    Postural Deviations head and neck     Head and Neck left rotation;forward head   Not rigid - likely due to decreased R attention       Gait/Walking Locomotion Goal 1    Distance 250 feet        Gait/Walking Locomotion Goal 2    Distance 500 feet        Wheelchair Locomotion Goal 1    Assistive Device manual, lightweight     Distance 50 feet        Wheelchair Locomotion Goal 2    Assistive Device manual, lightweight     Distance 100 feet        Patient/Family Goals    Patient's Goals For Discharge other (see comments)   Pt unable to state goals at this time       Therapy Assessment/Plan (PT)    Rehab Potential/Prognosis (PT) good, to achieve stated therapy goals     Frequency of Treatment (PT) 5-7 times per  "week;60-90 minutes per day     Estimated Duration of Therapy (PT) 2 weeks     Problem List (PT) problems related to;balance;cognition;coordination;postural control;strength;communication;inability to direct their own care     Activity Limitations Related to Problem List unable to ambulate safely;unable to transfer safely     Planned Therapy Interventions balance training;bed mobility training;gait training;home exercise program;neuromuscular re-education;patient/family education;stair training;strengthening;stretching;transfer training;wheelchair management/propulsion training     Comment, Therapy Assessment/Plan (PT) \"Sharlene\" is a 78 y.o F with PMHx significant for AAA, heart block s/p cardiac pacemaker, glaucoma, HLD, CAD, and NSTEMI who presents with L MCA ischemic stroke. Pt sent to [facility] ED after not being seen by her friends for a few days - pt found by EMS to be confused and combative within her home. Upon arrival to ED pt aphasic with R sided weakness - CT scan revealed acute infarct in L MCA with mild bleeding.  Echo showed EF of 43% Etiology of stroke was a localized apical aneurysm containing thrombus. Per chart review, pt was (I) PTA and lives alone but has supportive friends. Pt now [facility] to [facility] and upon PT IE, she requires CSup for bed mobility, steadying assist for STS transfers and SPTs, steadying assist for ambulation up to 200' without an AD, and steadying assist for elevations. Second person intermittently utilized to assist with environmental navigation and demo of tasks due to decreased safety awareness, impulsivity, and distractability with limited command following. She is limited by cognition and communication at this time and is only able to follow one-step commands intermittently. Pt primarily responds with \"yes/no\" but intermittenly demonstrates understanding via facial expressions and/or pointing. R sided neglect noted during mobility, see OT eval for further details, and PT " cueing/guidance utilized to assist wtih navigation. Full impairment testing (MMT, sensation/propioception, etc) unable to be assessed 2/2 cognition and communication deficits. Pt is limited by impaired standing balance, decreased safety awareness, poor insight, impulsivity and coordination. Pt would benefit from IP rehab to address above deficits and improve functional mobility with anticipated need for assistance and supervision post d/c due to cognitive deficits. Lap belt donned as ordered at end of session and direct hand off to 1:1 completed        Daily Progress Summary (PT)    Recommendations (PT) Jorge-height chair ordered - assess fit/seating, and provide instruction on how to self-propel                      Education Documentation  Mobility, taught by Ashleigh Rdz, PT at 7/2/2023 12:55 PM.  Learner: Patient  Readiness: Acceptance  Method: Explanation, Demonstration  Response: Needs Reinforcement  Comment: Pt educated on role of PT, 3-hour therapy rull, safety with mobiltiy, and falls prevention          IRF PT Goals    Flowsheet Row Most Recent Value   Bed Mobility Goal 1    Activity/Assistive Device bed mobility activities, all at 07/02/2023 0830   [place] supervision required at 07/02/2023 0830   Time Frame short-term goal (STG), 5 - 7 days at 07/02/2023 0830   Bed Mobility Goal 2    Activity/Assistive Device bed mobility activities, all at 07/02/2023 0830   [place] modified independence at 07/02/2023 0830   Time Frame long-term goal (LTG), 14 days or less at 07/02/2023 0830   Transfer Goal 1    Activity/Assistive Device sit-to-stand/stand-to-sit, bed-to-chair/chair-to-bed, stand pivot at 07/02/2023 0830   [place] supervision required at 07/02/2023 0830   Time Frame short-term goal (STG), 5 - 7 days at 07/02/2023 0830   Transfer Goal 2    Activity/Assistive Device sit-to-stand/stand-to-sit, bed-to-chair/chair-to-bed, stand pivot at 07/02/2023 0830   [place] modified independence at  07/02/2023 0830   Time Frame long-term goal (LTG), 14 days or less at 07/02/2023 0830   Gait/Walking Locomotion Goal 1    Activity/Assistive Device gait (walking locomotion) at 07/02/2023 0830   Distance 250 feet at 07/02/2023 0830   [place] supervision required at 07/02/2023 0830   Time Frame short-term goal (STG), 5 - 7 days at 07/02/2023 0830   Gait/Walking Locomotion Goal 2    Activity/Assistive Device gait (walking locomotion) at 07/02/2023 0830   Distance 500 feet at 07/02/2023 0830   [place] supervision required at 07/02/2023 0830   Time Frame long-term goal (LTG), 14 days or less at 07/02/2023 0830   Wheelchair Locomotion Goal 1    Activity wheelchair mobility skills, all at 07/02/2023 0830   Assistive Device manual, lightweight at 07/02/2023 0830   Distance 50 feet at 07/02/2023 0830   [place] minimum assist (75% or more patient effort) at 07/02/2023 0830   Time Frame short-term goal (STG), 5 - 7 days at 07/02/2023 0830   Wheelchair Locomotion Goal 2    Activity wheelchair mobility skills, all at 07/02/2023 0830   Assistive Device manual, lightweight at 07/02/2023 0830   Distance 100 feet at 07/02/2023 0830   [place] supervision required at 07/02/2023 0830   Time Frame long-term goal (LTG), 14 days or less at 07/02/2023 0830   Stairs Goal 1    Activity/Assistive Device stairs, all skills at 07/02/2023 0830   Number of Stairs 16 at 07/02/2023 0830   [place] supervision required at 07/02/2023 0830   Time Frame short-term goal (STG), 5 - 7 days at 07/02/2023 0830   Stairs Goal 2    Activity/Assistive Device stairs, all skills at 07/02/2023 0830   Number of Stairs 24 at 07/02/2023 0830   [place] supervision required at 07/02/2023 0830   Time Frame long-term goal (LTG), 14 days or less at 07/02/2023 0830

## 2023-07-03 ENCOUNTER — APPOINTMENT (INPATIENT)
Dept: SPEECH THERAPY | Facility: REHABILITATION | Age: 78
DRG: 057 | End: 2023-07-03
Payer: MEDICARE

## 2023-07-03 ENCOUNTER — APPOINTMENT (INPATIENT)
Dept: OCCUPATIONAL THERAPY | Facility: REHABILITATION | Age: 78
DRG: 057 | End: 2023-07-03
Payer: MEDICARE

## 2023-07-03 ENCOUNTER — APPOINTMENT (INPATIENT)
Dept: PHYSICAL THERAPY | Facility: REHABILITATION | Age: 78
DRG: 057 | End: 2023-07-03
Payer: MEDICARE

## 2023-07-03 ENCOUNTER — APPOINTMENT (OUTPATIENT)
Dept: PSYCHOLOGY | Facility: CLINIC | Age: 78
End: 2023-07-03
Payer: MEDICARE

## 2023-07-03 LAB
ANION GAP SERPL CALC-SCNC: 7 MEQ/L (ref 3–15)
BUN SERPL-MCNC: 6 MG/DL (ref 7–25)
CALCIUM SERPL-MCNC: 8.4 MG/DL (ref 8.6–10.3)
CHLORIDE SERPL-SCNC: 108 MEQ/L (ref 98–107)
CO2 SERPL-SCNC: 25 MEQ/L (ref 21–31)
CREAT SERPL-MCNC: 0.6 MG/DL (ref 0.6–1.2)
GFR SERPL CREATININE-BSD FRML MDRD: >60 ML/MIN/1.73M*2
GLUCOSE SERPL-MCNC: 93 MG/DL (ref 70–99)
MAGNESIUM SERPL-MCNC: 1.9 MG/DL (ref 1.9–2.7)
POTASSIUM SERPL-SCNC: 3.2 MEQ/L (ref 3.5–5.1)
SODIUM SERPL-SCNC: 140 MEQ/L (ref 136–145)

## 2023-07-03 PROCEDURE — 83735 ASSAY OF MAGNESIUM: CPT | Performed by: HOSPITALIST

## 2023-07-03 PROCEDURE — 97530 THERAPEUTIC ACTIVITIES: CPT | Mod: GO

## 2023-07-03 PROCEDURE — 97530 THERAPEUTIC ACTIVITIES: CPT | Mod: GP,59

## 2023-07-03 PROCEDURE — 12800000 HC ROOM AND CARE SEMIPRIVATE REHAB

## 2023-07-03 PROCEDURE — 63700000 HC SELF-ADMINISTRABLE DRUG: Performed by: HOSPITALIST

## 2023-07-03 PROCEDURE — 90791 PSYCH DIAGNOSTIC EVALUATION: CPT

## 2023-07-03 PROCEDURE — 63700000 HC SELF-ADMINISTRABLE DRUG: Performed by: STUDENT IN AN ORGANIZED HEALTH CARE EDUCATION/TRAINING PROGRAM

## 2023-07-03 PROCEDURE — 92526 ORAL FUNCTION THERAPY: CPT | Mod: GN

## 2023-07-03 PROCEDURE — 97116 GAIT TRAINING THERAPY: CPT | Mod: GP

## 2023-07-03 PROCEDURE — 36415 COLL VENOUS BLD VENIPUNCTURE: CPT | Performed by: HOSPITALIST

## 2023-07-03 PROCEDURE — 92507 TX SP LANG VOICE COMM INDIV: CPT | Mod: GN

## 2023-07-03 PROCEDURE — 97535 SELF CARE MNGMENT TRAINING: CPT | Mod: GO

## 2023-07-03 PROCEDURE — 80048 BASIC METABOLIC PNL TOTAL CA: CPT | Performed by: HOSPITALIST

## 2023-07-03 RX ORDER — POTASSIUM CHLORIDE 750 MG/1
40 TABLET, EXTENDED RELEASE ORAL 2 TIMES DAILY
Status: DISCONTINUED | OUTPATIENT
Start: 2023-07-03 | End: 2023-07-04

## 2023-07-03 RX ORDER — POTASSIUM CHLORIDE 750 MG/1
40 TABLET, EXTENDED RELEASE ORAL 2 TIMES DAILY
Status: DISCONTINUED | OUTPATIENT
Start: 2023-07-03 | End: 2023-07-03

## 2023-07-03 RX ORDER — LANOLIN ALCOHOL/MO/W.PET/CERES
400 CREAM (GRAM) TOPICAL 2 TIMES DAILY
Status: DISCONTINUED | OUTPATIENT
Start: 2023-07-03 | End: 2023-07-10

## 2023-07-03 RX ADMIN — PANTOPRAZOLE SODIUM 40 MG: 40 TABLET, DELAYED RELEASE ORAL at 09:32

## 2023-07-03 RX ADMIN — TRAZODONE HYDROCHLORIDE 25 MG: 50 TABLET, FILM COATED ORAL at 21:02

## 2023-07-03 RX ADMIN — APIXABAN 5 MG: 5 TABLET, FILM COATED ORAL at 21:02

## 2023-07-03 RX ADMIN — CYCLOSPORINE 1 DROP: 0.5 EMULSION OPHTHALMIC at 21:02

## 2023-07-03 RX ADMIN — ROSUVASTATIN CALCIUM 20 MG: 20 TABLET, FILM COATED ORAL at 21:02

## 2023-07-03 RX ADMIN — APIXABAN 5 MG: 5 TABLET, FILM COATED ORAL at 09:32

## 2023-07-03 RX ADMIN — CYCLOSPORINE 1 DROP: 0.5 EMULSION OPHTHALMIC at 09:33

## 2023-07-03 RX ADMIN — Medication 3 MG: at 21:02

## 2023-07-03 RX ADMIN — POTASSIUM CHLORIDE 40 MEQ: 750 TABLET, EXTENDED RELEASE ORAL at 21:10

## 2023-07-03 RX ADMIN — Medication 400 MG: at 21:10

## 2023-07-03 SDOH — ECONOMIC STABILITY: FOOD INSECURITY: WITHIN THE PAST 12 MONTHS, YOU WORRIED THAT YOUR FOOD WOULD RUN OUT BEFORE YOU GOT THE MONEY TO BUY MORE.: NEVER TRUE

## 2023-07-03 SDOH — ECONOMIC STABILITY: HOUSING INSECURITY
IN THE LAST 12 MONTHS, WAS THERE A TIME WHEN YOU DID NOT HAVE A STEADY PLACE TO SLEEP OR SLEPT IN A SHELTER (INCLUDING NOW)?: NO

## 2023-07-03 SDOH — ECONOMIC STABILITY: FOOD INSECURITY: HOW HARD IS IT FOR YOU TO PAY FOR THE VERY BASICS LIKE FOOD, HOUSING, MEDICAL CARE, AND HEATING?: NOT HARD AT ALL

## 2023-07-03 SDOH — ECONOMIC STABILITY: HOUSING INSECURITY: IN THE LAST 12 MONTHS, WAS THERE A TIME WHEN YOU WERE NOT ABLE TO PAY THE MORTGAGE OR RENT ON TIME?: NO

## 2023-07-03 SDOH — ECONOMIC STABILITY: FOOD INSECURITY: WITHIN THE PAST 12 MONTHS, THE FOOD YOU BOUGHT JUST DIDN'T LAST AND YOU DIDN'T HAVE MONEY TO GET MORE.: NEVER TRUE

## 2023-07-03 SDOH — ECONOMIC STABILITY: TRANSPORTATION INSECURITY: IN THE PAST 12 MONTHS, HAS LACK OF TRANSPORTATION KEPT YOU FROM MEDICAL APPOINTMENTS OR FROM GETTING MEDICATIONS?: NO

## 2023-07-03 SDOH — ECONOMIC STABILITY: HOUSING INSECURITY: IN THE LAST 12 MONTHS, HOW MANY PLACES HAVE YOU LIVED?: 1

## 2023-07-03 ASSESSMENT — COGNITIVE AND FUNCTIONAL STATUS - GENERAL
PSYCHOMOTOR FUNCTIONING: WNL
APPEARANCE: WELL GROOMED
THOUGHT_PROCESS: UNABLE TO ASSESS
ATTENTION: UNABLE TO ASSESS
PERCEPTUAL FUNCTION: VISUAL IMPAIRMENT
ORIENTATION: ORIENTED TO PERSON
RECENT MEMORY: UNABLE TO ASSESS
SLEEP_WAKE_CYCLE: DECREASED
AFFECT: FULL RANGE;TEARFUL
SPEECH: APHASIC
CONCENTRATION: UNABLE TO ASSESS
IMPULSE CONTROL: IMPAIRED, MODERATELY
LIBIDO: NON-CONTRIBUTORY
INSIGHT: IMPAIRED, MODERATELY
THOUGHT_CONTENT: UNABLE TO ASSESS
APPETITE: DECREASED
EYE_CONTACT: WNL
REMOTE MEMORY: UNABLE TO ASSESS
AROUSAL LEVEL: ALERT
EST. PREMORBID INTELLIGENCE: AVERAGE
MOOD: DEPRESSED;ANXIOUS;MOTIVATED

## 2023-07-03 ASSESSMENT — ACTIVITIES OF DAILY LIVING (ADL): LACK_OF_TRANSPORTATION: NO

## 2023-07-03 ASSESSMENT — BALANCE ASSESSMENTS: TOTAL SCORE: 33

## 2023-07-03 NOTE — PROGRESS NOTES
Patient: Jennifer Sams  Location: [facility] [address]  MRN: [id number]  Today's date: 7/3/2023    History of Present Illness  Jennifer is a 78 y.o. female admitted on 7/1/2023 with Acute ischemic left MCA stroke (CMS/HCC) [I63.512]. Principal problem is Acute ischemic left MCA stroke (CMS/HCC).    The patient is a 78-year-old  female with a history of AAA, heart block s/p cardiac pacemaker, glaucoma, hyperlipidemia, coronary artery disease, NSTEMI, who presented to [facility] on June 26 after she has not been seen by her friends for a few days.  Patient lives alone and when she was found by EMS she was confused and combative.  In the emergency department she was aphasic with right-sided weakness and hypertensive to the 190 systolic.  CT scan of the head revealed an acute infarct in left MCA with mild bleeding.  Echo showed an EF of 43% and a bubble study was negative.  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  Later this was transitioned to apixaban.  She was continued on statin for secondary prevention.  She was evaluated by speech therapy and cleared for a soft and bite-size diet with moderately thick liquids.    7/2/2023:  Diet SB6 with mildly thick liquids (MT2)    Past Medical History  Jennifer has a past medical history of AAA (abdominal aortic aneurysm), Arthritis, Elevated blood pressure reading without diagnosis of hypertension (4/19/2019), Epistaxis (1/6/2020), GERD (gastroesophageal reflux disease) (4/19/2019), Glaucoma (4/19/2019), Heart murmur, Hiatal hernia, History of hip replacement, total, right (4/19/2019), History of rheumatic fever as a child (4/19/2019), Hypercholesterolemia (4/19/2019), Ischemic cardiomyopathy (5/9/2019), Kidney cysts, Osteoarthritis of multiple joints (4/19/2019), Osteoporosis, Pacemaker (12/9/2019), Raynaud's disease (4/19/2019), 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         07/03/23 1005 07/03/23 1058   Pain/Comfort/Sleep   Pain Charting Type Pain Assessment Pain Reassessment   Preferred Pain Scale word (verbal rating pain scale) word (verbal rating pain scale)   Word Pain Rating: Rest 0 - no pain 0 - no pain   Word Pain Rating: Activity 0 - no pain 0 - no pain   Vitals   Heart Rate 64  --    Heart Rate Source Left Radial  --    BP (!) 147/75  --    BP Location Left upper arm  --    BP Method Manual  --    Patient Position Sitting  --          Prior Living Environment    Flowsheet Row Most Recent Value   People in Home alone   Current Living Arrangements home   Home Accessibility --  [2 SH with 1 DARÍO]   Living Environment Comment Per chart review, pt lives alone in a 2SH with 1STE   Number of Stairs, Main Entrance 1   Number of Stairs, Within Home, Primary 12  [Full fight to second floor]          Prior Level of Function    Flowsheet Row Most Recent Value   Dominant Hand --  [Unable to determine 2/2 cognitive/communication deficits]   Ambulation independent   Transferring independent   Toileting independent   Bathing independent   Dressing independent   Eating independent   IADLs independent   Communication understands/communicates without difficulty   Swallowing swallows foods/liquids without difficulty   Baseline Diet/Method of Nutritional Intake no diet restrictions   Past History of Dysphagia no   Prior Level of Function Comment Per chart review: PMH SB6,  MT2,  No VFSS in EMR.  Pt seen by SLP at [place].   Assistive Device Currently Used at Home --  [Per chart review pt has RW at home - unclear if pt was using AD and pt unable to state PLOF at this time]          Occupational Profile    Flowsheet Row Most Recent Value   Occupational History/Life Experiences Will need to clarify PLOF/driving status. Pt unable to state 2/2  cognitive/communication deficits.   Environmental Supports and Barriers Per chart review, pt lives alone but has supportive friends.   Patient Goals Pt unable to state goal 2/2 communication deficits.             07/03/23 1009   OT Time Calculation   Start Time 1000   Stop Time 1100   Time Calculation (min) 60 min   Session Details   Document Type Daily Treatment/Progress Note   Mode of Treatment occupational therapy;individual therapy   General Information   General Observations of Patient Pt received sitting upright in w/c. Direct handoff pre session from 1:1 and post session to PT.   Mobility Belt   Mobility Belt Used for All Out of Bed Activity no   Reason Mobility Belt Not Used patient refused   Cognition/Psychosocial   Comment, Cognitive Interventions Pt participated in the following non-linguistic cogntiive tasks: (1) Pt tasked with matching 12 large shapes to their matching location on the shape board. Pt able to complete c 100% accuracy c increased time, Noted pt utilizing trial and error to find correct location for majority of shapes. (2) Task then upgraded to matching a deck of cards to a visual card display. Pt required increased time matching 15 cards within time constraints of session - however matching 15/15 c 100% accuracy. Min VCs initially during task for R attention. Noted pt demo logical scanning t/o, scanning up/down each row from L > R.   Strength (Manual Muscle Testing)   Shoulder, Right (Strength) 4-/5   Elbow, Right (Strength) 4-/5   Wrist, Right (Strength) 0/5   Hand, Right (Strength) 0/5   Sit to Stand Transfer   [place], Sit to Stand Transfer touching/steadying assist   Safety/Cues impulsivity   Assistive Device none   Comment From w/c.   Stand to Sit Transfer   [place], Stand to Sit Transfer touching/steadying assist   Safety/Cues impulsivity   Assistive Device none   Comment To w/c.   Stand Pivot Transfer   [place], Stand Pivot/Stand Step Transfer touching/steadying  assist   Safety/Cues impulsivity;technique   Assistive Device none   Comment Via amb approach.   Toilet Transfer   Transfer Technique stand pivot   [place] touching/steadying assist   Safety/Cues impulsivity   Assistive Device grab bars/safety frame   Comment Via amb approach onto accessible height toilet.   Impairments/Safety Issues   Comment, Safety Issues/Impairments OT: Medium distance HHM completed within therapy gym and from therapy gym <> pt room c Steadying A s AD.   Balance   Comment, Balance Unsupported standing on blue airex foam during unilateral UE table top cognitive tasks c Steadying A for balance.   Grooming   Tasks washes, rinses and dries hands   [place] touching/steadying assist   Safety/Cues impulsivity;attention;sequencing   Position supported standing;unsupported standing   Comment Pt stood at sink to wash hands after toileting c Steadying A for balance. Multimodal cues to utilize soap, locate paper towels/soap and sequence task.   Toileting   Tasks adjust/manage clothing;perform bowel hygiene   [place] minimum assist (75% or more patient effort)   Safety/Cues impulsivity;sequencing   Position unsupported sitting;unsupported standing   Adaptive Equipment none   Comment Continent of small BM and urine while seated on toilet. Steayding A for balance during clothing management and Min A to manage clothing up on R side.   Daily Progress Summary (OT)   Daily Outcome Statement Pt tolerated session well c focus on dynamic balance, functional transfers/mobility and non-linguistic cognition. Continue OT POC.               IRF OT Goals    Flowsheet Row Most Recent Value   Transfer Goal 1    Activity/Assistive Device toilet at 07/02/2023 0711   [place] --  [Cl S] at 07/02/2023 0711   Time Frame short-term goal (STG), 5 - 7 days at 07/02/2023 0711   Transfer Goal 2    Activity/Assistive Device toilet at 07/02/2023 0711   [place] supervision required at 07/02/2023 0711   Time Frame  long-term goal (LTG), 14 days or less at 07/02/2023 0711   Transfer Goal 3    Activity/Assistive Device shower at 07/02/2023 0711   [place] --  [Cl S] at 07/02/2023 0711   Time Frame short-term goal (STG), 5 - 7 days at 07/02/2023 0711   Transfer Goal 4    Activity/Assistive Device shower at 07/02/2023 0711   [place] supervision required at 07/02/2023 0711   Time Frame long-term goal (LTG), 14 days or less at 07/02/2023 0711   Bathing Goal 1    [place] --  [Steadying A] at 07/02/2023 0711   Time Frame short-term goal (STG), 5 - 7 days at 07/02/2023 0711   Bathing Goal 2    [place] supervision required at 07/02/2023 0711   Time Frame long-term goal (LTG), 14 days or less at 07/02/2023 0711   UB Dressing Goal 1    [place] --  [Steadying A] at 07/02/2023 0711   Time Frame short-term goal (STG), 5 - 7 days at 07/02/2023 0711   UB Dressing Goal 2    [place] set-up required at 07/02/2023 0711   Time Frame long-term goal (LTG), 14 days or less at 07/02/2023 0711   LB Dressing Goal 1    [place] --  [Steadying A] at 07/02/2023 0711   Time Frame short-term goal (STG), 5 - 7 days at 07/02/2023 0711   LB Dressing Goal 2    [place] supervision required at 07/02/2023 0711   Time Frame long-term goal (LTG), 14 days or less at 07/02/2023 0711   Grooming Goal 1    [place] --  [TBA] at 07/02/2023 0711   Grooming Goal 2    [place] set-up required at 07/02/2023 0711   Time Frame long-term goal (LTG), 14 days or less at 07/02/2023 0711   Toileting Goal 1    [place] --  [Steadying A] at 07/02/2023 0711   Time Frame short-term goal (STG), 5 - 7 days at 07/02/2023 0711   Toileting Goal 2    [place] supervision required at 07/02/2023 0711   Time Frame long-term goal (LTG), 14 days or less at 07/02/2023 0711

## 2023-07-03 NOTE — PLAN OF CARE
Problem: Rehabilitation (IRF) Plan of Care  Goal: Plan of Care Review  Outcome: Progressing  Flowsheets (Taken 7/3/2023 9755)  Progress: improving  Plan of Care Reviewed With: patient  Outcome Evaluation: alert, aphasic, cont bowel and bladder, ted poor, 1:1 maintained for safety floor bed, lap belt, some resistance using gait belt and being in w/c, amb to br   Plan of Care Review  Plan of Care Reviewed With: patient  Progress: improving  Outcome Evaluation: alert, aphasic, cont bowel and bladder, ted poor, 1:1 maintained for safety floor bed, lap belt, some resistance using gait belt and being in w/c, amb to br

## 2023-07-03 NOTE — PROGRESS NOTES
Patient: Jennifer Sams  Location: [facility] [address]  MRN: [id number]  Today's date: 7/3/2023    History of Present Illness  Jennifer is a 78 y.o. female admitted on 7/1/2023 with Acute ischemic left MCA stroke (CMS/HCC) [I63.512]. Principal problem is Acute ischemic left MCA stroke (CMS/HCC).    The patient is a 78-year-old  female with a history of AAA, heart block s/p cardiac pacemaker, glaucoma, hyperlipidemia, coronary artery disease, NSTEMI, who presented to [facility] on June 26 after she has not been seen by her friends for a few days.  Patient lives alone and when she was found by EMS she was confused and combative.  In the emergency department she was aphasic with right-sided weakness and hypertensive to the 190 systolic.  CT scan of the head revealed an acute infarct in left MCA with mild bleeding.  Echo showed an EF of 43% and a bubble study was negative.  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  Later this was transitioned to apixaban.  She was continued on statin for secondary prevention.  She was evaluated by speech therapy and cleared for a soft and bite-size diet with moderately thick liquids.    7/2/2023:  Diet SB6 with mildly thick liquids (MT2)    Past Medical History  Jennifer has a past medical history of AAA (abdominal aortic aneurysm), Arthritis, Elevated blood pressure reading without diagnosis of hypertension (4/19/2019), Epistaxis (1/6/2020), GERD (gastroesophageal reflux disease) (4/19/2019), Glaucoma (4/19/2019), Heart murmur, Hiatal hernia, History of hip replacement, total, right (4/19/2019), History of rheumatic fever as a child (4/19/2019), Hypercholesterolemia (4/19/2019), Ischemic cardiomyopathy (5/9/2019), Kidney cysts, Osteoarthritis of multiple joints (4/19/2019), Osteoporosis, Pacemaker (12/9/2019), Raynaud's disease (4/19/2019), 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        OT Vitals    Date/Time Pulse HR Source BP BP Location BP Method Pt Position [facility]   07/03/23 1402 72 Monitor 136/65 Left upper arm Automatic Sitting KM      OT Pain    Date/Time Pain Type Rating: Rest Rating: Activity [facility]   07/03/23 1402 Pain Assessment 0-->no hurt 0-->no hurt KM   07/03/23 1428 Pain Reassessment 0-->no hurt 0-->no hurt KM          Prior Living Environment    Flowsheet Row Most Recent Value   People in Home alone   Current Living Arrangements home   Home Accessibility --  [2 SH with 1 DARÍO]   Living Environment Comment Per chart review, pt lives alone in a 2SH with 1STE   Number of Stairs, Main Entrance 1   Number of Stairs, Within Home, Primary 12  [Full fight to second floor]          Prior Level of Function    Flowsheet Row Most Recent Value   Dominant Hand --  [Unable to determine 2/2 cognitive/communication deficits]   Ambulation independent   Transferring independent   Toileting independent   Bathing independent   Dressing independent   Eating independent   IADLs independent   Communication understands/communicates without difficulty   Swallowing swallows foods/liquids without difficulty   Baseline Diet/Method of Nutritional Intake no diet restrictions   Past History of Dysphagia no   Prior Level of Function Comment Per chart review: PMH SB6,  MT2,  No VFSS in EMR.  Pt seen by SLP at [place].   Assistive Device Currently Used at Home --  [Per chart review pt has RW at home - unclear if pt was using AD and pt unable to state PLOF at this time]          Occupational Profile    Flowsheet Row Most Recent Value   Occupational History/Life Experiences Will need to clarify PLOF/driving status. Pt unable to state 2/2 cognitive/communication deficits.   Environmental Supports and Barriers Per chart review, pt lives alone but has supportive friends.    Patient Goals Pt unable to state goal 2/2 communication deficits.           IRF OT Evaluation and Treatment - 07/03/23 1402        OT Time Calculation    Start Time 1400     Stop Time 1430     Time Calculation (min) 30 min        Session Details    Document Type Daily Treatment/Progress Note     Mode of Treatment occupational therapy;individual therapy        General Information    General Observations of Patient Pt received for session in room direct hand off from 1:1. Pt c small cut on R posterior forearm, observed c potential bleeding but c further assessment appears cut to be old/no active bleeding. RN assessed, pt declines band aid. Direct hand off to 1:1 at end of session.        Mobility Belt    Mobility Belt Used for All Out of Bed Activity no     Reason Mobility Belt Not Used patient refused        Cognition/Psychosocial    Comment, Cognitive Interventions Non-linguistic cognition- Pt completing large DLM pegboard c L UE c random color placement of pegs in organized manner x5 in each column/row c equal spacing. Then, progressed to attempted trial sort by color. Initially max verbal & visual cues needed c placement of appropriate color in pt’s hand for first of x5 colors. Pt able to carryover c self-correction of errors.        Vision Assessment/Intervention    Impact of Vision Impairment on Function Noted R inattention during tasks        Sit to Stand Transfer    [place], Sit to Stand Transfer close supervision     Safety/Cues impulsivity     Assistive Device none     Comment EOB and w/c to stance        Stand to Sit Transfer    [place], Stand to Sit Transfer close supervision     Safety/Cues impulsivity     Assistive Device none     Comment stance to w/c        Stand Pivot Transfer    [place], Stand Pivot/Stand Step Transfer touching/steadying assist     Safety/Cues impulsivity     Assistive Device none     Comment via ambulatory approach from EOB, to/from w/c        Impairments/Safety  Issues    Comment, Safety Issues/Impairments OT: HHM s AD in pt's room & throughout gym/hospital hallway environment. Steadying A-Cl S for safety.        Balance    Comment, Balance Supported/unsupported standing at table x6 min (x2 trials) while engaging in tabletop activities. Steadying A-Cl S for safety.        Motor Skills    Comment, Motor Skills Facilitation of R UE palmar weight bearing on tabletop while pt in stance & engaging in L UE reach task for item placement outside LJ anteriorly.        Upper Extremity (Therapeutic Exercise)    Reps and Sets x10-15 reps     Comment Seated in w/c, R PROM wrist flex/ext        Hand (Therapeutic Exercise)    Reps and Sets x10-15 reps     Comment Seated in w/c, R PROM digit flex/ext        Daily Progress Summary (OT)    Daily Outcome Statement Session focused on standing balance/tolerance, R UE NMRE, and non-linguistic cognition. Steadying A-Cl S required for balance in stance & functional mobility s AD, no LOB. Initiated attempted R UE palmar weight bearing during tabletop task c L UE reach. Direct hand off to 1:1 at end of session c lap belt in place as ordered, though 1:1 removing to retrieve pt's standing daily weight. Continue OT to address functional deficits.                           IRF OT Goals    Flowsheet Row Most Recent Value   Transfer Goal 1    Activity/Assistive Device toilet at 07/02/2023 0711   [place] --  [Cl S] at 07/02/2023 0711   Time Frame short-term goal (STG), 5 - 7 days at 07/02/2023 0711   Transfer Goal 2    Activity/Assistive Device toilet at 07/02/2023 0711   [place] supervision required at 07/02/2023 0711   Time Frame long-term goal (LTG), 14 days or less at 07/02/2023 0711   Transfer Goal 3    Activity/Assistive Device shower at 07/02/2023 0711   [place] --  [Cl S] at 07/02/2023 0711   Time Frame short-term goal (STG), 5 - 7 days at 07/02/2023 0711   Transfer Goal 4    Activity/Assistive Device shower at 07/02/2023 0711    [place] supervision required at 07/02/2023 0711   Time Frame long-term goal (LTG), 14 days or less at 07/02/2023 0711   Bathing Goal 1    [place] --  [Steadying A] at 07/02/2023 0711   Time Frame short-term goal (STG), 5 - 7 days at 07/02/2023 0711   Bathing Goal 2    [place] supervision required at 07/02/2023 0711   Time Frame long-term goal (LTG), 14 days or less at 07/02/2023 0711   UB Dressing Goal 1    [place] --  [Steadying A] at 07/02/2023 0711   Time Frame short-term goal (STG), 5 - 7 days at 07/02/2023 0711   UB Dressing Goal 2    [place] set-up required at 07/02/2023 0711   Time Frame long-term goal (LTG), 14 days or less at 07/02/2023 0711   LB Dressing Goal 1    [place] --  [Steadying A] at 07/02/2023 0711   Time Frame short-term goal (STG), 5 - 7 days at 07/02/2023 0711   LB Dressing Goal 2    [place] supervision required at 07/02/2023 0711   Time Frame long-term goal (LTG), 14 days or less at 07/02/2023 0711   Grooming Goal 1    [place] --  [TBA] at 07/02/2023 0711   Grooming Goal 2    [place] set-up required at 07/02/2023 0711   Time Frame long-term goal (LTG), 14 days or less at 07/02/2023 0711   Toileting Goal 1    [place] --  [Steadying A] at 07/02/2023 0711   Time Frame short-term goal (STG), 5 - 7 days at 07/02/2023 0711   Toileting Goal 2    [place] supervision required at 07/02/2023 0711   Time Frame long-term goal (LTG), 14 days or less at 07/02/2023 0711

## 2023-07-03 NOTE — PROGRESS NOTES
Patient: Jennifer Sams  Location: [facility] [address]  MRN: [id number]  Today's date: 7/3/2023    History of Present Illness  Jennifer is a 78 y.o. female admitted on 7/1/2023 with Acute ischemic left MCA stroke (CMS/HCC) [I63.512]. Principal problem is Acute ischemic left MCA stroke (CMS/HCC).    The patient is a 78-year-old  female with a history of AAA, heart block s/p cardiac pacemaker, glaucoma, hyperlipidemia, coronary artery disease, NSTEMI, who presented to [facility] on June 26 after she has not been seen by her friends for a few days.  Patient lives alone and when she was found by EMS she was confused and combative.  In the emergency department she was aphasic with right-sided weakness and hypertensive to the 190 systolic.  CT scan of the head revealed an acute infarct in left MCA with mild bleeding.  Echo showed an EF of 43% and a bubble study was negative.  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  Later this was transitioned to apixaban.  She was continued on statin for secondary prevention.  She was evaluated by speech therapy and cleared for a soft and bite-size diet with moderately thick liquids.    7/2/2023:  Diet SB6 with mildly thick liquids (MT2)    Past Medical History  Jennifer has a past medical history of AAA (abdominal aortic aneurysm), Arthritis, Elevated blood pressure reading without diagnosis of hypertension (4/19/2019), Epistaxis (1/6/2020), GERD (gastroesophageal reflux disease) (4/19/2019), Glaucoma (4/19/2019), Heart murmur, Hiatal hernia, History of hip replacement, total, right (4/19/2019), History of rheumatic fever as a child (4/19/2019), Hypercholesterolemia (4/19/2019), Ischemic cardiomyopathy (5/9/2019), Kidney cysts, Osteoarthritis of multiple joints (4/19/2019), Osteoporosis, Pacemaker (12/9/2019), Raynaud's disease (4/19/2019), 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        SLP Pain    Date/Time Pain Type Rating: Rest Who   07/03/23 1305 Pain Assessment 0 - no pain CWB   07/03/23 1355 Pain Reassessment 0 - no pain CWB          Prior Living Environment    Flowsheet Row Most Recent Value   People in Home alone   Current Living Arrangements home   Home Accessibility --  [2 SH with 1 DARÍO]   Living Environment Comment Per chart review, pt lives alone in a 2SH with 1STE   Number of Stairs, Main Entrance 1   Number of Stairs, Within Home, Primary 12  [Full fight to second floor]          Prior Level of Function    Flowsheet Row Most Recent Value   Dominant Hand --  [Unable to determine 2/2 cognitive/communication deficits]   Ambulation independent   Transferring independent   Toileting independent   Bathing independent   Dressing independent   Eating independent   IADLs independent   Communication understands/communicates without difficulty   Swallowing swallows foods/liquids without difficulty   Baseline Diet/Method of Nutritional Intake no diet restrictions   Past History of Dysphagia no   Prior Level of Function Comment Per chart review: PMH SB6,  MT2,  No VFSS in EMR.  Pt seen by SLP at [place].   Assistive Device Currently Used at Home --  [Per chart review pt has RW at home - unclear if pt was using AD and pt unable to state PLOF at this time]           IRF SLP Evaluation and Treatment - 07/03/23 1305        SLP Time Calculation    Start Time 1300     Stop Time 1400     Time Calculation (min) 60 min        Session Details    Document Type Daily Treatment/Progress Note     Mode of Treatment speech language pathology;individual therapy        General Information    Patient Profile Reviewed yes     General Observations of Patient pt received and returned to 1:1 at beginning and end of session        Cognition/Psychosocial    Comment,  Attention Pt restless and mildly agitated in beginning of session, almost hitting 1:1. 1:1 reported that pt easily redirected through most of day but becomes agitated when being told to do something she does not want to do (ex. wearing hospital socks versus her own)  Pt calmed down with low demands and once rapport established, pt attended throughout with min-mod cues. Visual distractibility noted with pt frequently touching therapist earrings, refolding washcloth etc.        Motor Speech    Comment, Motor Speech Intervention Using bathroom mirror for feedback, pt imitated different mouth positions/OMEs and imitated vowels x 5/5 and functional CV words (no, yeah, me, hi, bye) x 5/5 with overall mod cues.        Auditory Comprehension    Comment, Intervention (Auditory Comprehension) bio yes/no ?s for paired opposites was relaible x 100% with slow, structured presentation; followed simple motor commands x 0/5 I, 3/5 with model, 5/5 following [place] assist. Did not benefit from written word directions. Followed commands in context of toothbrusheing routine x @75%        Verbal Expression    Comment, Intervention (Verbal Expression) pt using gestures effectively to indicate basic needs (ex. to use bathroom, to get therpist to hand her a towel, to show therapist that cup was dirty). Occasional appropriate verbalizations of yes/no in context. Unable to name objects with max cues;        Food and Liquid Trials (NIS)    Patient Positioning upright in wheelchair     Oral Intake/Feeding Performance set-up of food/liquid needed     Thin Liquids single cup sips;multiple consecutive cup sips;patient-controlled amounts     Comment, Thin Liquids 4oz apple juice; 2 oz water     Food Consistencies Evaluated easy to chew (EC7);regular     Easy to Chew (EC7) patient controlled amounts     Comment, Easy to Chew (EC7) cookie x 3 bites     Regular patient controlled amounts     Comment, Regular pizza x 1 bite     Oral Preparatory Phase of  Swallow moderate impairment;mastication, excessive;mastication, slow but effective;oral retention/residue, bilateral;loss of bolus/oral leakage, left side     Oral Phase of Swallow moderate impairment;delayed anterior-posterior transit;oral residue, incomplete swallow;suspect posterior bolus leak     Comment, Oral Phase mild oral leakage with thins; overall incoordinated bolus retrieval; prolonged oral prep and effective posterior transfer of cookie; uncoordinated bolus formation and ineffective posterior transfer of pizza; unable to clear with multiple attemtps and liquid wash; needed to expectorate     Pharyngeal Phase of Swallow uncoordinated swallow     Comment, Pharyngeal Phase Pt with delayed mild cough following sips of thins x 2 (less than 10% of aggressive trials); Pt with cough x 1 during oral prep of pizza; suspect mild sxs of aspiration related to posterior lingual leakage related to incoordination resulting from oral apraxia     Comment 1:1 reports pt ate only one bite of oatmeal at breakfast and refused all solids at lunch; Pt's friend reports pt likes pizza, stromboli, chicken; will continue to monitor intake; nutrtion consult recommended/ Pt assessed for use of regular versus suction toothbrush; pt required cues for motor planning to use toothbrush appropriately; rinsed and spit with water [place] and effectively; no sxs of aspiration; cleared for regular toothbrush use        Daily Progress Summary (SLP)    Daily Outcome Statement Pt with prolonged oral prep and decreased oral clearing of EC7 and Regular trials today; Pt with incoordinated bolus retrieval and decreased control for small sip size but minimal overt sxs of aspiration with thins today; Will continue to monitor in tx; Pt with improving imiation of simple vowels and CV words; pt with improving use of gestures to communicate basic needs; Cont POC                      IRF SLP Goals    Flowsheet Row Most Recent Value   Auditory Comprehension  Goal 1    Auditory Comprehension Goal 1 pt will complete basic Aud comprehension tasks (simple yes/no 75%),  1 step commands 50% with max cues,  match word heard to picture f/o 2 50% max cues at 07/02/2023 1001   Time Frame short-term goal (STG), 1 week at 07/02/2023 1001   Auditory Comprehension Goal 2    Auditory Comprehension Goal 2 Pt will understand mod-complex level y/n questions 90% min cues,  2 step commands 75% min-mod cues,  at 07/02/2023 1001   Time Frame long-term goal (LTG), 4 weeks at 07/02/2023 1001   Verbal Expression Goal 1    Verbal Expression Goal 1 Pt will complete basic VE tasks (name. automatics, simple opposites, naming) with 20% accuracy and max cues. at 07/02/2023 1001   Time Frame short-term goal (STG), 1 week at 07/02/2023 1001   Verbal Expression Goal 2    Verbal Expression Goal 2 Pt will complete Verbal expression tasks (basic-mod) with 50-75% accuracy and min-mod cues.  Pt will imitate vowels 50% max cues,  at 07/02/2023 1001   Time Frame long-term goal (LTG), 4 weeks at 07/02/2023 1001   Reading Comprehension Goal 1    Reading Comprehension Goal 1 Pt will match word to picture field of 2 with 50% accuracy and mod-max cues,  Pt will read single functional words with 20% accuracy max cues at 07/02/2023 1001   Time Frame short-term goal (STG), 1 week at 07/02/2023 1001   Reading Comprehension Goal 2    Reading Comprehension Goal 2 Pt will read simple sentences and comprehend simple sentences with 75% accuracy min-mod cues at 07/02/2023 1001   Time Frame long-term goal (LTG) at 07/02/2023 1001   Written Expression Goal 1    Written Expression Goal 1 Pt will copy first name, shapes, letters and 3-4 letter words with 80% min cues,  pt will copy letters, name shapes with 50% accuracy and mod cues. at 07/02/2023 1001   Time Frame short-term goal (STG), 1 week at 07/02/2023 1001   Written Expression Goal 2    Written Expression Goal 2 Pt will write first and last name,  single words 90%  accuracy at 07/02/2023 1001   Oral Nutrition/Hydration Goal 1    Activity effective/safe, use of swallowing techniques, management of texture/viscosity, other (see comments)  [SB6,  MT2.  Trials of thins and various textures by speech with min s/s of aspiration] at 07/02/2023 1001   Time Frame short-term goal (STG), 14 days or less at 07/02/2023 1001   Oral Nutrition/Hydration Goal 2    Activity effective/safe, oral nutrition/hydration, use of swallowing techniques, management of texture/viscosity, other (see comments)  [regular and thins] at 07/02/2023 1001   Time Frame long-term goal (LTG), 4 weeks at 07/02/2023 1001   Oral Motor Exercise Goal 1    Activity perform oral motor strengthening exercises, facial/cheek, lip, tongue, strength/ROM, mastication/bolus manipulation, 5-10 times per session, other (see comments)  [use mirror/visual model] at 07/02/2023 1001   [place] with 1:1 supervision/constant cues at 07/02/2023 1001   Time Frame short-term goal (STG), 14 days or less at 07/02/2023 1001   Oral Motor Exercise Goal 2    Activity perform oral motor strengthening exercises, facial/cheek, lip, tongue, strength/ROM, mastication/bolus manipulation, other (see comments)  [2-3 times a day sets of 10-15] at 07/02/2023 1001   [place] with minimal cues (75-90% accuracy) at 07/02/2023 1001   Time Frame long-term goal (LTG), 4 weeks at 07/02/2023 1001

## 2023-07-03 NOTE — PROGRESS NOTES
Patient: Jennifer Sams  Location: [facility] [address]  MRN: [id number]  Today's date: 7/3/2023    History of Present Illness  Jennifer is a 78 y.o. female admitted on 7/1/2023 with Acute ischemic left MCA stroke (CMS/HCC) [I63.512]. Principal problem is Acute ischemic left MCA stroke (CMS/HCC).    The patient is a 78-year-old  female with a history of AAA, heart block s/p cardiac pacemaker, glaucoma, hyperlipidemia, coronary artery disease, NSTEMI, who presented to [facility] on June 26 after she has not been seen by her friends for a few days.  Patient lives alone and when she was found by EMS she was confused and combative.  In the emergency department she was aphasic with right-sided weakness and hypertensive to the 190 systolic.  CT scan of the head revealed an acute infarct in left MCA with mild bleeding.  Echo showed an EF of 43% and a bubble study was negative.  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  Later this was transitioned to apixaban.  She was continued on statin for secondary prevention.  She was evaluated by speech therapy and cleared for a soft and bite-size diet with moderately thick liquids.    7/2/2023:  Diet SB6 with mildly thick liquids (MT2)    Past Medical History  Jennifer has a past medical history of AAA (abdominal aortic aneurysm), Arthritis, Elevated blood pressure reading without diagnosis of hypertension (4/19/2019), Epistaxis (1/6/2020), GERD (gastroesophageal reflux disease) (4/19/2019), Glaucoma (4/19/2019), Heart murmur, Hiatal hernia, History of hip replacement, total, right (4/19/2019), History of rheumatic fever as a child (4/19/2019), Hypercholesterolemia (4/19/2019), Ischemic cardiomyopathy (5/9/2019), Kidney cysts, Osteoarthritis of multiple joints (4/19/2019), Osteoporosis, Pacemaker (12/9/2019), Raynaud's disease (4/19/2019), 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        PT Vitals    Date/Time Pulse BP BP Location BP Method Pt Position [facility]   07/03/23 1106 68 124/61 Left upper arm Automatic Sitting LBR      PT Pain    Date/Time Pain Type Rating: Rest Rating: Activity [facility]   07/03/23 1106 Pain Assessment 0 - no pain 0 - no pain LBR   07/03/23 1134 Post Activity 0 - no pain 0 - no pain LBR          Prior Living Environment    Flowsheet Row Most Recent Value   People in Home alone   Current Living Arrangements home   Home Accessibility stairs to enter home (Group), stairs within home (Group)   Living Environment Comment Per chart review, pt lives alone in a 2SH with 1STE   Number of Stairs, Main Entrance 1   Number of Stairs, Within Home, Primary 12  [Full fight to second floor]          Prior Level of Function    Flowsheet Row Most Recent Value   Dominant Hand --  [Unable to determine 2/2 cognitive/communication deficits]   Ambulation independent   Transferring independent   Toileting independent   Bathing independent   Dressing independent   Eating independent   IADLs independent   Communication understands/communicates without difficulty   Swallowing swallows foods/liquids without difficulty   Baseline Diet/Method of Nutritional Intake no diet restrictions   Past History of Dysphagia no   Prior Level of Function Comment Per chart review: PMH SB6,  MT2,  No VFSS in EMR.  Pt seen by SLP at [place].   Assistive Device Currently Used at Home --  [Per chart review pt has RW at home - unclear if pt was using AD and pt unable to state PLOF at this time]           IRF PT Evaluation and Treatment - 07/03/23 1115        PT Time Calculation    Start Time 1100     Stop Time 1200     Time Calculation (min) 60 min        Session Details    Document Type Daily Treatment/Progress Note     Mode of Treatment physical therapy;individual therapy         Mobility Belt    Reason Mobility Belt Not Used patient refused        Sit to Stand Transfer    [place], Sit to Stand Transfer close supervision     Safety/Cues impulsivity     Assistive Device none     Comment from w/c, mat        Stand to Sit Transfer    [place], Stand to Sit Transfer close supervision     Safety/Cues impulsivity     Assistive Device none     Comment to w/c, mat        Stand Pivot Transfer    [place], Stand Pivot/Stand Step Transfer touching/steadying assist     Safety/Cues impulsivity     Assistive Device none     Comment amb approach        Gait Training    [place], Gait touching/steadying assist     Safety/Cues impulsivity     Assistive Device none     Distance in Feet 300 feet     Pattern step-through     Deviations/Abnormal Patterns base of support, narrow;scissoring   variable lateral sway    Bilateral Gait Deviations heel strike decreased     Advanced Gait Activity sloped surfaces     Comment (Gait/Stairs) 2 gait trials as noted above: 300'x2 with steadying assist for balance.  Decreased R visual scanning noted.        Curb Negotiation    [place] touching/steadying assist     Assistive Device none     Curb Height 6 inches     Comment assist for balance        Sloped Surface Gait Skills    [place] touching/steadying assist     Assistive Device none     Distance in Feet 5 feet     Comment indoor ramp; assist for balance        Stairs Training    [place], Stairs touching/steadying assist     Safety/Cues impulsivity     Assistive Device railing     Handrail Location (Stairs) left side (ascending);left side (descending)     Number of Stairs 16     Stair Height 7 inches     Ascending Stairs Technique step-over-step     Descending Stairs Technique step-over-step     Comment steadying assist for balance        Balance    Balance Test Results Gutierrez Balance Scale        Gutierrez Balance Scale    Sitting to Standing Able to stand independently using hands     Standing  Unsupported Able to stand 2 minutes with supervision     Sitting with Back Unsupported but Feet Supported on Floor or on a Stool Able to sit safely and securely for 2 minutes     Standing to Sitting Controls descent by using hands     Transfers Able to transfer with verbal cueing and/or supervision     Standing Unsupported with Eyes Closed Able to stand 10 seconds with supervision     Standing Unsupported with Feet Together Able to place feet together independently but unable to hold for 30 seconds     Reach Forward with Outstretched Arm While Standing Can reach forward 5 cm (2 inches)      Object from Floor from a Standing Position Able to  slipper but needs supervision     Turning to Look Behind Over Left and Right Shoulders While Standing Turns sideways only but maintains balance     Turn 360 Degrees Needs close supervision or verbal cueing     Place Alternate Foot on Step or Stool While Standing Unsupported Able to complete 4 steps without aid with supervision     Standing Unsupported One Foot in Front Able to take small step independently and hold 30 seconds     Standing on One Leg Tries to lift leg unable to hold 3 seconds but remains standing independently     Mcgrath Balance Score 33        Daily Progress Summary (PT)    Daily Outcome Statement Pt refused use of mobility belt.  MCGRATH completed with MAX visual demonstration and occasional tactile cueing required.  Pt scored a 33/56 which places pt at increased risk of falls.  Pt is impulsive requiring manual cueing for navigation and safety t/o session.  Pt returned to room and positioned in bed (per pt request), 1:1 bedside.                           IRF PT Goals    Flowsheet Row Most Recent Value   Bed Mobility Goal 1    Activity/Assistive Device bed mobility activities, all at 07/02/2023 0830   [place] supervision required at 07/02/2023 0830   Time Frame short-term goal (STG), 5 - 7 days at 07/02/2023 0830   Bed Mobility Goal 2     Activity/Assistive Device bed mobility activities, all at 07/02/2023 0830   [place] modified independence at 07/02/2023 0830   Time Frame long-term goal (LTG), 14 days or less at 07/02/2023 0830   Transfer Goal 1    Activity/Assistive Device sit-to-stand/stand-to-sit, bed-to-chair/chair-to-bed, stand pivot at 07/02/2023 0830   [place] supervision required at 07/02/2023 0830   Time Frame short-term goal (STG), 5 - 7 days at 07/02/2023 0830   Transfer Goal 2    Activity/Assistive Device sit-to-stand/stand-to-sit, bed-to-chair/chair-to-bed, stand pivot at 07/02/2023 0830   [place] modified independence at 07/02/2023 0830   Time Frame long-term goal (LTG), 14 days or less at 07/02/2023 0830   Gait/Walking Locomotion Goal 1    Activity/Assistive Device gait (walking locomotion) at 07/02/2023 0830   Distance 250 feet at 07/02/2023 0830   [place] supervision required at 07/02/2023 0830   Time Frame short-term goal (STG), 5 - 7 days at 07/02/2023 0830   Gait/Walking Locomotion Goal 2    Activity/Assistive Device gait (walking locomotion) at 07/02/2023 0830   Distance 500 feet at 07/02/2023 0830   [place] supervision required at 07/02/2023 0830   Time Frame long-term goal (LTG), 14 days or less at 07/02/2023 0830   Wheelchair Locomotion Goal 1    Activity wheelchair mobility skills, all at 07/02/2023 0830   Assistive Device manual, lightweight at 07/02/2023 0830   Distance 50 feet at 07/02/2023 0830   [place] minimum assist (75% or more patient effort) at 07/02/2023 0830   Time Frame short-term goal (STG), 5 - 7 days at 07/02/2023 0830   Wheelchair Locomotion Goal 2    Activity wheelchair mobility skills, all at 07/02/2023 0830   Assistive Device manual, lightweight at 07/02/2023 0830   Distance 100 feet at 07/02/2023 0830   [place] supervision required at 07/02/2023 0830   Time Frame long-term goal (LTG), 14 days or less at 07/02/2023 0830   Stairs Goal 1    Activity/Assistive Device  stairs, all skills at 07/02/2023 0830   Number of Stairs 16 at 07/02/2023 0830   [place] supervision required at 07/02/2023 0830   Time Frame short-term goal (STG), 5 - 7 days at 07/02/2023 0830   Stairs Goal 2    Activity/Assistive Device stairs, all skills at 07/02/2023 0830   Number of Stairs 24 at 07/02/2023 0830   [place] supervision required at 07/02/2023 0830   Time Frame long-term goal (LTG), 14 days or less at 07/02/2023 0830

## 2023-07-03 NOTE — PLAN OF CARE
Plan of Care Review  Plan of Care Reviewed With: patient  Progress: improving  Outcome Evaluation: Pt is sleeping in floor bed wih 1:1 ATC. Cont of B&B. Barrier cream to sacrum.  Denied pain/discomfort. Pt has global aphasia. She appears to understand but is very particular about the way she likes things especially taking her meds. Had to redirect and coax her to take her Potassium pills. She likes her privacy and does not like alot of stimulation around her. Likes her pills 1 at a time on a spoon with mildly thickened juice like iced tea. Moves around in floor bed during the night setting of alarm but has not been impulsive in bed tonight. Aspiration and Fall precautions maintained. Call bell within reach.

## 2023-07-03 NOTE — PROGRESS NOTES
[facility] Internal Medicine Progress Note          Patient was seen and examined.     Ms Sams is a 78-year-old female with ischemic cardiomyopathy, heart block s/p PPM, CAD, AAA, hypertension, GERD who was admitted to [facility] 6/26 with altered mental status.  She was noted to be aphasic with right-sided weakness  Initial CT scan concerning for acute infarct in the posterior aspect of the left MCA territory with associated thrombosed vessel  Follow-up CT head revealed acute/subacute infarct in the left MCA with 3 mm midline shift, no definite hemorrhage  She was started on aspirin and heparin drip  Noted dysphagia, speech therapy recommends soft and bite-size diet with mildly thick liquids  Echocardiogram 6/27 reported LVEF 43%, localized apical aneurysm containing thrombus which most likely caused her acute CVA.  She was transitioned to Eliquis, her aspirin was discontinued  She continues on Crestor for dyslipidemia  She is now referred to [facility] for acute inpatient rehabilitation      Subjective      Patient without nursing issues overnight.  Alert this morning.  Expressive aphasia.  Appears comfortable.  Blood pressures are well controlled.  Potassium continues to trend low- replete.  Patient remains interactive with therapies and continues to progress.    Objective     Vital signs in last 24 hours:  Temp:  [36.8 °C (98.2 °F)-36.9 °C (98.5 °F)] 36.8 °C (98.2 °F)  Heart Rate:  [60-72] 72  Resp:  [16-18] 18  BP: (124-172)/(60-75) 136/65     Allergies   Allergen Reactions   • Ace Inhibitors Other (see comments)     cough   • Atorvastatin      Nausea and can'trecall   • Brilinta [Ticagrelor]      SOB   • Codeine      Nausea and Vomiting   • Dilaudid [Hydromorphone]      Nausea & vomiting   • Morphine Sulfate Nausea Only and GI intolerance   • Onion      Severe abdominal pain   • Oxycodone      Nausea & vomiting, dizziness     Medications:      • apixaban  5 mg oral BID   •  cycloSPORINE  1 drop Both Eyes q12h ROBERTO   • melatonin  3 mg oral Nightly   • pantoprazole  40 mg oral Daily   • rosuvastatin  20 mg oral Nightly   • trazodone  25 mg oral Nightly         Objective      Full Code    Physical Exam  HEENT: PERRLA, EOMI, sclera anicteric, mucous membranes moist  Neck: Supple, JVP less than 5 cm  Heart: Regular rhythm, no murmurs, rubs or gallops  Lungs: Clear to auscultation  Abdomen: Normal bowel sounds, soft, nontender, no rebound or guarding  Extremities: No clubbing, cyanosis or edema  Neuro: Patient alert, expressive aphasia, following simple commands with right-sided weakness    Lab Results   Component Value Date    GLUCOSE 93 07/03/2023    CALCIUM 8.4 (L) 07/03/2023     07/03/2023    K 3.2 (L) 07/03/2023    CO2 25 07/03/2023     (H) 07/03/2023    BUN 6 (L) 07/03/2023    CREATININE 0.6 07/03/2023       Lab Results   Component Value Date    WBC 4.83 07/02/2023    HGB 11.4 (L) 07/02/2023    HCT 34.6 (L) 07/02/2023    MCV 92.0 07/02/2023     (L) 07/02/2023       Chest x-ray 6/26:  Impression:  No active disease in the chest     CT head stroke alert 6/26:  IMPRESSION:   1. Findings highly concerning for an acute infarct in the posterior aspect of the left MCA territory with an associated thrombosed vessel.   2. Chronic microangiopathy and involutional changes.     CTA head/neck 6/26, CT brain perfusion:  IMPRESSION:   1. Atherosclerosis at the carotid bifurcations with mild luminal narrowing measuring approximately 25% on the right and 20% on the left using NASCET criteria. Patent cervical carotid and vertebral arteries.   2. Termination of flow in an M3 branch of the left middle cerebral artery.   3. Intracranial atherosclerosis with significant narrowing involving the left V4 segment after the takeoff of the posterior inferior cerebellar artery. Mild irregularity of the basilar artery.   4. Completed infarct in the posterior aspect of the left MCA territory. No  evidence of ischemic penumbra. rCBF <30% : 0 cc TMax > 6s: 2 cc Mismatch volume: 2 cc Mismatch Ratio: NA   5. Additional chronic and incidental findings, as above. In order to accelerate response time, other vascular pathology including occlusions of more distal arteries are not completely evaluated on this examination.     CT head 6/26:  IMPRESSION:   Evolving left MCA territory infarct.  Small hyperdense foci within the infarct may reflect contrast staining from interval angiography versus punctate hemorrhage.  Short interval follow-up recommended.     CT head 6/27:  IMPRESSION:   Acute/subacute left MCA distribution infarct with 3 mm midline shift.. No definite hemorrhage    Transthoracic echo 6/27:  Left ventricle with normal dimensions and regional contraction abnormalities consistent with CAD in the LAD distribution: Localized apical infarction with aneurysm formation.  Paradoxical septal movement is consistent with ventricular pacing.  There is moderate left ventricular systolic and mild diastolic dysfunction.  LVEF 43% Left ventricular apical thrombus Left atrial pressure 14 mmHg There is a visual suggestion of mild aortic stenosis. (Doppler assessment of the aortic valve is suboptimal) Mild mitral regurgitation Normal right ventricle dimensions and systolic function: TAPSE 1.84 cm Mild tricuspid regurgitation PASP 34 mmHg Normal left atrial volume index No pericardial effusion or vegetations AS-V pacing Technically difficult study: No parasternal imaging obtained This study was performed with Definity contrast to optimize evaluation of regional and global left ventricular function No significant change from 3/28/2023 except for current demonstration of left ventricular apical thrombus    Assessment     Neuro:  Left MCA CVA  -Initial CT with concern for possible bleed, repeat scan without hemorrhage but 3 mm midline shift  -Continue Eliquis, statin, blood pressure management for secondary  prevention  -Consult neurology     Cardiovascular:  Echo 6/27 showing LVEF 43%, localized apical aneurysm containing thrombus, possible mild AAS, mild MR, normal RV, PASP 34  -Has been started on Eliquis, aspirin discontinued  Dysrhythmia, history of heart block, s/p [employer] pacemaker  -Follows with Dr. Carolina as outpatient  CAD  -NSTEMI 4/26/19, 3vCAD, s/p FRANK x2 LAD, FRANK x4 RCA, FRANK x1LCx OM2  Ischemic cardiomyopathy  -Previously recommended Entresto, but was cost prohibitive; valsartan also recommended but not started due to concern for dizziness     Pulmonary:  -Increased risk of aspiration due to dysphagia  -Maintain aspiration precautions  -Encourage use of incentive spirometry for prevention of atelectasis     Endocrine:  Dyslipidemia  -Continue Crestor, low-fat diet     Renal:  BUN/Cr 12/0.7 with GFR> 60  -Continue to monitor renal function, volume status     GI:  Dysphagia  -Soft and bite sized, mildly thick diet level  -Consult speech therapy  -Maintain aspiration precautions  KELLI/GI prophylaxis  -continue PPI therapy  Constipation  -Bowel program available as needed     :  Increased risk of urinary retention, UTI due to CVA  -Monitor for symptom complaint     Heme:  Anemia, normochromic, normocytic with normal RDW  -Monitor trend hemoglobin on apixaban  Thrombocytopenia  -Continue to monitor trend platelet count     F/E/N:  Soft and bite-size, mildly thick, cardiac diet  -Monitor electrolytes, hydration, nutritional status  -Increased risk of electrolyte abnormalities, dehydration due to altered diet  -Consult nutrition     Prophylaxis:  -Apixaban DVT prophylaxis  -PPI GI prophylaxis  -Spirometry for atelectasis  -Maintain fall, cardiac, aspiration precautions        Darrel Ramey MD  7/3/2023

## 2023-07-03 NOTE — PROGRESS NOTES
07/03/23 1200   General Information   Preferred Language eng   Referring Facility Type acute care facility   Advance Directives (for Healthcare)   Does patient have advance directive? Yes   Patient has Advance Directive Advance Directive in EMR   Living Environment   Current Living Arrangements home   Home Accessibility   (2 SH with 1 DARÍO)   People in Home alone   Primary Care Provided by self   Living Environment   Able to Return to Prior Arrangements yes   Employment/   Employment Status retired   Current or Previous Occupation   (customer service)   Discharge Needs Assessment   Concerns to be Addressed care coordination/care conferences;caregiver training;discharge planning   Patient/Family Anticipates Transition to home;home with help/services   Patient/Family Anticipated Services at Transition home health care   Transportation Anticipated family or friend will provide   Anticipated Discharge Disposition home with home health   Current Discharge Risk lives alone   Discharge Planning   Type of Home Care Services home OT;home PT;home SLP;nursing   Discharge Transportation   Does the patient need discharge transport arranged? No   Plan   Plan Pt was ind at home prior to this event.  She was driving.  She has very supportive friends who have POA.  CM following for d/c planning and will coordinate services as needed upon d/c.   Patient/Family in Agreement with Plan yes   Team Conference   Next Team Conference Date 07/05/23

## 2023-07-03 NOTE — PROGRESS NOTES
Inpatient Psychology Initial Intake    Duration:  60 minutes    Jennifer Sams, : 1945, a 78 y.o. female, for initial evaluation visit to discuss Adjustment to Disability and Cognitive/Language Dysfunction.    HPI: Jennifer is a 78 y.o. female admitted on 2023 with Acute ischemic left MCA stroke (CMS/HCC) [I63.512]. Principal problem is Acute ischemic left MCA stroke (CMS/HCC).     The patient is a 78-year-old  female with a history of AAA, heart block s/p cardiac pacemaker, glaucoma, hyperlipidemia, coronary artery disease, NSTEMI, who presented to [facility] on  after she has not been seen by her friends for a few days.  Patient lives alone and when she was found by EMS she was confused and combative.  In the emergency department she was aphasic with right-sided weakness and hypertensive to the 190 systolic.  CT scan of the head revealed an acute infarct in left MCA with mild bleeding.  Echo showed an EF of 43% and a bubble study was negative.  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  Later this was transitioned to apixaban.  She was continued on statin for secondary prevention.  She was evaluated by speech therapy and cleared for a soft and bite-size diet with moderately thick liquids.     2023:  Diet SB6 with mildly thick liquids (MT2)     Past Medical History  Jennifer has a past medical history of AAA (abdominal aortic aneurysm), Arthritis, Elevated blood pressure reading without diagnosis of hypertension (2019), Epistaxis (2020), GERD (gastroesophageal reflux disease) (2019), Glaucoma (2019), Heart murmur, Hiatal hernia, History of hip replacement, total, right (2019), History of rheumatic fever as a child (2019), Hypercholesterolemia (2019), Ischemic cardiomyopathy (2019), Kidney cysts, Osteoarthritis of multiple joints (2019), Osteoporosis,  Pacemaker (12/9/2019), Raynaud's disease (4/19/2019), RSD (reflex sympathetic dystrophy) (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       Past Medical History:   Diagnosis Date   • AAA (abdominal aortic aneurysm) (CMS/[facility])    • Arthritis    • Elevated blood pressure reading without diagnosis of hypertension 4/19/2019   • Epistaxis 1/6/2020   • GERD (gastroesophageal reflux disease) 4/19/2019   • Glaucoma 4/19/2019   • Heart murmur    • Hiatal hernia    • History of hip replacement, total, right 4/19/2019    Right hip 9/ 2016 and revision 2017    • History of rheumatic fever as a child 4/19/2019   • Hypercholesterolemia 4/19/2019   • Ischemic cardiomyopathy 5/9/2019   • Kidney cysts    • Osteoarthritis of multiple joints 4/19/2019   • Osteoporosis    • Pacemaker 12/9/2019   • Raynaud's disease 4/19/2019   • RSD (reflex sympathetic dystrophy) 4/19/2019    Of left foot   • SOB (shortness of breath) 12/10/2019   • Status post insertion of drug eluting coronary artery stent 5/9/2019     Past Surgical History:   Procedure Laterality Date   • CHOLECYSTECTOMY OPEN     • CORONARY ANGIOPLASTY WITH STENT PLACEMENT  04/29/2019    of LAD   • CORONARY ANGIOPLASTY WITH STENT PLACEMENT  04/30/2019    of RCA   • DILATION AND CURETTAGE OF UTERUS     • EYE SURGERY      RIGHT CATARACT   • FOOT SURGERY Left    • JOINT REPLACEMENT Right 09/2016    total hip   • REVISION TOTAL HIP ARTHROPLASTY Right 2017   • TONSILLECTOMY       Family History   Problem Relation Age of Onset   • Congenital heart disease Biological Mother         noted at autopsy   • Pulmonary fibrosis Biological Father    • Heart attack Paternal Grandfather      Social History     Socioeconomic History   • Marital status: Single     Spouse name: None   • Number of children: None   • Years of education: None   • Highest education level: None   Tobacco Use   • Smoking status: Never   •  Smokeless tobacco: Never   Vaping Use   • Vaping Use: Never used   Substance and Sexual Activity   • Alcohol use: Not Currently   • Drug use: No   • Sexual activity: Defer     Social Determinants of Health     Financial Resource Strain: Low Risk  (7/3/2023)    Overall Financial Resource Strain (CARDIA)    • Difficulty of Paying Living Expenses: Not hard at all   Food Insecurity: No Food Insecurity (7/3/2023)    Hunger Vital Sign    • Worried About Running Out of Food in the Last Year: Never true    • Ran Out of Food in the Last Year: Never true   Transportation Needs: No Transportation Needs (7/3/2023)    PRAPARE - Transportation    • Lack of Transportation (Medical): No    • Lack of Transportation (Non-Medical): No   Housing Stability: Low Risk  (7/3/2023)    Housing Stability Vital Sign    • Unable to Pay for Housing in the Last Year: No    • Number of Places Lived in the Last Year: 1    • Unstable Housing in the Last Year: No       Current Legal Status:       Number of Arrests:      Previous Mental Health History:   Previous Mental Health Treatment:  N/A  Previous Suicidal Behavior:  N/A  Previous Self-Injurious Behavior:  N/A  Previous Homicidal Behavior:  N/A  Previous Substance Abuse Treatment: N/A    Current Facility-Administered Medications   Medication Dose Route Frequency Provider Last Rate Last Admin   • acetaminophen (TYLENOL) tablet 650 mg  650 mg oral q6h PRN Adlea Eugene MD       • apixaban (ELIQUIS) tablet 5 mg  5 mg oral BID Adela Eugene MD   5 mg at 07/03/23 0932   • cycloSPORINE (RESTASIS) 0.05 % ophthalmic emulsion 1 drop  1 drop Both Eyes q12h ROBERTO Adela Eugene MD   1 drop at 07/03/23 0933   • magnesium oxide (MAG-OX) tablet 400 mg  400 mg oral BID Darrel Ramey MD       • melatonin ODT 3 mg  3 mg oral Nightly Ulises Chowdhury MD   3 mg at 07/02/23 2040   • pantoprazole (PROTONIX) tablet,delayed release (DR/EC) 40 mg  40 mg oral Daily Adela Eugene MD   40 mg at 07/03/23 0932   •  potassium chloride (KLOR-CON M) ER tablet (particles/crystals) 40 mEq  40 mEq oral BID Darrel Ramey MD       • rosuvastatin (CRESTOR) tablet 20 mg  20 mg oral Nightly Adela Eugene MD   20 mg at 07/02/23 2040   • senna (SENOKOT) tablet 1 tablet  1 tablet oral 2x daily PRN Adela Eugene MD       • trazodone (DESYREL) pre-halved tablet 25 mg  25 mg oral Nightly Ulises Chowdhury MD   25 mg at 07/02/23 2040       Current Evaluation:   Mental Status Exam:  Arousal Level: Alert  Appearance: Well Groomed  Speech: Aphasic  Psychomotor Functioning: WNL  Eye Contact: WNL  Est. Premorbid Intelligence: Average  Orientation: Oriented to person  Attention: Unable to Assess  Concentration: Unable to Assess  Recent Memory: Unable to assess  Remote Memory: Unable to Assess  Thought Content: Unable to Assess  Thought Process: Unable to Assess  Insight: Impaired, moderately  Perceptual Function: Visual impairment  Sleeping: Decreased  Appetite: Decreased  Libido: Non-Contributory  Affect: Full Range, Tearful  Mood: Depressed, Anxious, Motivated    Assessments done this visit:     [place] Suicide Severity Rating Scale:  Not indicated       [place] Suicide Severity Rating Scale  1. Within the past month, have you wished you were dead or wished you could go to sleep and not wake up?: No (unable to answer, gloal aphasia)  2. Within the past month, have you actually had any thoughts of killing yourself?: No (unable to answer, gloal aphasia)  6. Have you ever done anything, started to do anything, or prepared to do anything to end your life?: No (unable to answer, gloal aphasia)    Safe-T Assessment:  Not indicated        Comments:     Risk Assessment:   Suicidal Ideation: Suicide Risk Assessment Not Applicable for this patient  Self Injurious Behavior:  Not Present  Irritability:  Not Present  Homicidal Behavior: Not Present  Estimate of Risk:  None  If risk identified have suicide precautions been implemented? No     Goals  Addressed                 This Visit's Progress    • Increase adjustment to rehabilitation facility.       • Maximize adjustment and acceptance of limitations       • Reduce anxiety/depression             Interventions  Acceptance & Adjustment  Anxiety Reduction Techniques  Communication Skills development  Insight Development  Monitoring of Symptoms  Psychoeducation    Psychoeducation provided on:  CVA and Recovery and Depression Anxiety     Recommendations:      Individual Therapy  30 minutesweekly      Visit Diagnosis:     1. Adjustment disorder with mixed anxiety and depressed mood        Diagnostic Impression:   Weekly Outcome Statement: Pt is alert; presents with non-fluent global aphasia evidenced by reduced verbal output, inconsistent yes/no response, intermittent 1 step command following, anomia, and reduced repetition.  Temporal and spatial orientation as well as autobio information was difficult to obtain secondary to receptive and expressive aphasia.  Insight, judgment, and safety are deemed reduced.  Affect was WNR; pt denies distress or pain. She endorsed feelings of depression and anxiety related to her stroke (yes head nod on 2 separate occasions).  Pt endorses decreased sleep and appetite via thumbs up/down choices. Pt did not appear overwhelmed or impulsive during today's evaluation. She responds positively to calm and relaxed environment with encouragement and validation of feelings.  Will continue to establish functional communication system, conduct serial cognitive ass't, and monitor mood and behavioral sequelae during her inpatient admission.        LEEANN Ronquillo.MAUREEN @ 4:19 PM

## 2023-07-03 NOTE — PLAN OF CARE
Individualized Plan of Care    Jennifer Sams is admitted to [facility] for comprehensive inpatient rehabilitation for Stroke with functional deficits in cognitive function; communication; mobility; motor dysfunction; safety; self-care; other (see comments) (swallow). Patient is receiving the following services: speech language pathology; physical therapy; occupational therapy; medical consultative services; neuropsychologist; dysphagia management.    Frequency of Treatment (OT): 5-7 times per week, 60-90 minutes per day    Frequency of Treatment (PT): 5-7 times per week, 60-90 minutes per day    Frequency of Treatment (SLP): 5-7 times per week, 60 minutes per day      Active medical management is required for  Patient Active Problem List   Diagnosis   • Hypercholesterolemia   • Pseudoclaudication syndrome   • History of rheumatic fever as a child   • Glaucoma   • RSD (reflex sympathetic dystrophy)   • Raynaud's disease   • Osteoarthritis of multiple joints   • History of hip replacement, total, right   • GERD (gastroesophageal reflux disease)   • Elevated blood pressure reading without diagnosis of hypertension   • Abdominal aortic aneurysm (AAA) without rupture (CMS/HCC)   • NSTEMI (non-ST elevated myocardial infarction) (CMS/HCC)   • Ischemic cardiomyopathy   • Status post insertion of drug eluting coronary artery stent   • Chest pain   • Coronary artery disease involving native coronary artery of native heart with angina pectoris (CMS/HCC)   • Mobitz type 2 second degree atrioventricular block   • Cardiac pacemaker   • SOB (shortness of breath)   • Epistaxis   • Dyspnea on exertion   • Chest pain on breathing   • Seasonal allergies   • Raynaud's phenomenon   • Chronic combined systolic and diastolic heart failure (CMS/HCC)   • Other headache syndrome   • Coronary artery disease involving native heart without angina pectoris   • Mixed hyperlipidemia   • Dizziness   • Lumbar degenerative  disc disease   • Lumbar facet arthropathy   • Cerebrovascular accident (CVA), unspecified mechanism (CMS/HCC)   • Elevated blood pressure reading with diagnosis of hypertension   • Left ventricular apical thrombus   • Acute ischemic left MCA stroke (CMS/HCC)   • Follow-up exam   • Right wrist drop       Risk for Complications  Constipation: Moderate  Falls: Moderate  Infection: Moderate      The patient's medical prognosis is fair to achieve the stated goals below.    Expected Level of Function  Self-Care: Setup or clean-up assistance  Transfers: Setup or clean-up assistance  Locomotion: Setup or clean-up assistance  Communication: Setup or clean-up assistance  Social Cognition: Independent      Goals  IRF PT Goals    Flowsheet Row Most Recent Value   Bed Mobility Goal 1    Activity/Assistive Device bed mobility activities, all at 07/02/2023 0830   [place] supervision required at 07/02/2023 0830   Time Frame short-term goal (STG), 5 - 7 days at 07/02/2023 0830   Bed Mobility Goal 2    Activity/Assistive Device bed mobility activities, all at 07/02/2023 0830   [place] modified independence at 07/02/2023 0830   Time Frame long-term goal (LTG), 14 days or less at 07/02/2023 0830   Transfer Goal 1    Activity/Assistive Device sit-to-stand/stand-to-sit, bed-to-chair/chair-to-bed, stand pivot at 07/02/2023 0830   [place] supervision required at 07/02/2023 0830   Time Frame short-term goal (STG), 5 - 7 days at 07/02/2023 0830   Transfer Goal 2    Activity/Assistive Device sit-to-stand/stand-to-sit, bed-to-chair/chair-to-bed, stand pivot at 07/02/2023 0830   [place] modified independence at 07/02/2023 0830   Time Frame long-term goal (LTG), 14 days or less at 07/02/2023 0830   Gait/Walking Locomotion Goal 1    Activity/Assistive Device gait (walking locomotion) at 07/02/2023 0830   Distance 250 feet at 07/02/2023 0830   [place] supervision required at 07/02/2023 0830   Time Frame short-term goal  (STG), 5 - 7 days at 07/02/2023 0830   Gait/Walking Locomotion Goal 2    Activity/Assistive Device gait (walking locomotion) at 07/02/2023 0830   Distance 500 feet at 07/02/2023 0830   [place] supervision required at 07/02/2023 0830   Time Frame long-term goal (LTG), 14 days or less at 07/02/2023 0830   Wheelchair Locomotion Goal 1    Activity wheelchair mobility skills, all at 07/02/2023 0830   Assistive Device manual, lightweight at 07/02/2023 0830   Distance 50 feet at 07/02/2023 0830   [place] minimum assist (75% or more patient effort) at 07/02/2023 0830   Time Frame short-term goal (STG), 5 - 7 days at 07/02/2023 0830   Wheelchair Locomotion Goal 2    Activity wheelchair mobility skills, all at 07/02/2023 0830   Assistive Device manual, lightweight at 07/02/2023 0830   Distance 100 feet at 07/02/2023 0830   [place] supervision required at 07/02/2023 0830   Time Frame long-term goal (LTG), 14 days or less at 07/02/2023 0830   Stairs Goal 1    Activity/Assistive Device stairs, all skills at 07/02/2023 0830   Number of Stairs 16 at 07/02/2023 0830   [place] supervision required at 07/02/2023 0830   Time Frame short-term goal (STG), 5 - 7 days at 07/02/2023 0830   Stairs Goal 2    Activity/Assistive Device stairs, all skills at 07/02/2023 0830   Number of Stairs 24 at 07/02/2023 0830   [place] supervision required at 07/02/2023 0830   Time Frame long-term goal (LTG), 14 days or less at 07/02/2023 0830        IRF OT Goals    Flowsheet Row Most Recent Value   Transfer Goal 1    Activity/Assistive Device toilet at 07/02/2023 0711   [place] --  [Cl S] at 07/02/2023 0711   Time Frame short-term goal (STG), 5 - 7 days at 07/02/2023 0711   Transfer Goal 2    Activity/Assistive Device toilet at 07/02/2023 0711   [place] supervision required at 07/02/2023 0711   Time Frame long-term goal (LTG), 14 days or less at 07/02/2023 0711   Transfer Goal 3    Activity/Assistive Device shower at  07/02/2023 0711   [place] --  [Cl S] at 07/02/2023 0711   Time Frame short-term goal (STG), 5 - 7 days at 07/02/2023 0711   Transfer Goal 4    Activity/Assistive Device shower at 07/02/2023 0711   [place] supervision required at 07/02/2023 0711   Time Frame long-term goal (LTG), 14 days or less at 07/02/2023 0711   Bathing Goal 1    [place] --  [Steadying A] at 07/02/2023 0711   Time Frame short-term goal (STG), 5 - 7 days at 07/02/2023 0711   Bathing Goal 2    [place] supervision required at 07/02/2023 0711   Time Frame long-term goal (LTG), 14 days or less at 07/02/2023 0711   UB Dressing Goal 1    [place] --  [Steadying A] at 07/02/2023 0711   Time Frame short-term goal (STG), 5 - 7 days at 07/02/2023 0711   UB Dressing Goal 2    [place] set-up required at 07/02/2023 0711   Time Frame long-term goal (LTG), 14 days or less at 07/02/2023 0711   LB Dressing Goal 1    [place] --  [Steadying A] at 07/02/2023 0711   Time Frame short-term goal (STG), 5 - 7 days at 07/02/2023 0711   LB Dressing Goal 2    [place] supervision required at 07/02/2023 0711   Time Frame long-term goal (LTG), 14 days or less at 07/02/2023 0711   Grooming Goal 1    [place] --  [TBA] at 07/02/2023 0711   Grooming Goal 2    [place] set-up required at 07/02/2023 0711   Time Frame long-term goal (LTG), 14 days or less at 07/02/2023 0711   Toileting Goal 1    [place] --  [Steadying A] at 07/02/2023 0711   Time Frame short-term goal (STG), 5 - 7 days at 07/02/2023 0711   Toileting Goal 2    [place] supervision required at 07/02/2023 0711   Time Frame long-term goal (LTG), 14 days or less at 07/02/2023 0711        IRF SLP Goals    Flowsheet Row Most Recent Value   Auditory Comprehension Goal 1    Auditory Comprehension Goal 1 pt will complete basic Aud comprehension tasks (simple yes/no 75%),  1 step commands 50% with max cues,  match word heard to picture f/o 2 50% max cues at 07/02/2023 1001    Time Frame short-term goal (STG), 1 week at 07/02/2023 1001   Auditory Comprehension Goal 2    Auditory Comprehension Goal 2 Pt will understand mod-complex level y/n questions 90% min cues,  2 step commands 75% min-mod cues,  at 07/02/2023 1001   Time Frame long-term goal (LTG), 4 weeks at 07/02/2023 1001   Verbal Expression Goal 1    Verbal Expression Goal 1 Pt will complete basic VE tasks (name. automatics, simple opposites, naming) with 20% accuracy and max cues. at 07/02/2023 1001   Time Frame short-term goal (STG), 1 week at 07/02/2023 1001   Verbal Expression Goal 2    Verbal Expression Goal 2 Pt will complete Verbal expression tasks (basic-mod) with 50-75% accuracy and min-mod cues.  Pt will imitate vowels 50% max cues,  at 07/02/2023 1001   Time Frame long-term goal (LTG), 4 weeks at 07/02/2023 1001   Reading Comprehension Goal 1    Reading Comprehension Goal 1 Pt will match word to picture field of 2 with 50% accuracy and mod-max cues,  Pt will read single functional words with 20% accuracy max cues at 07/02/2023 1001   Time Frame short-term goal (STG), 1 week at 07/02/2023 1001   Reading Comprehension Goal 2    Reading Comprehension Goal 2 Pt will read simple sentences and comprehend simple sentences with 75% accuracy min-mod cues at 07/02/2023 1001   Time Frame long-term goal (LTG) at 07/02/2023 1001   Written Expression Goal 1    Written Expression Goal 1 Pt will copy first name, shapes, letters and 3-4 letter words with 80% min cues,  pt will copy letters, name shapes with 50% accuracy and mod cues. at 07/02/2023 1001   Time Frame short-term goal (STG), 1 week at 07/02/2023 1001   Written Expression Goal 2    Written Expression Goal 2 Pt will write first and last name,  single words 90% accuracy at 07/02/2023 1001   Oral Nutrition/Hydration Goal 1    Activity effective/safe, use of swallowing techniques, management of texture/viscosity, other (see comments)  [SB6,  MT2.  Trials of thins and various  textures by speech with min s/s of aspiration] at 07/02/2023 1001   Time Frame short-term goal (STG), 14 days or less at 07/02/2023 1001   Oral Nutrition/Hydration Goal 2    Activity effective/safe, oral nutrition/hydration, use of swallowing techniques, management of texture/viscosity, other (see comments)  [regular and thins] at 07/02/2023 1001   Time Frame long-term goal (LTG), 4 weeks at 07/02/2023 1001   Oral Motor Exercise Goal 1    Activity perform oral motor strengthening exercises, facial/cheek, lip, tongue, strength/ROM, mastication/bolus manipulation, 5-10 times per session, other (see comments)  [use mirror/visual model] at 07/02/2023 1001   [place] with 1:1 supervision/constant cues at 07/02/2023 1001   Time Frame short-term goal (STG), 14 days or less at 07/02/2023 1001   Oral Motor Exercise Goal 2    Activity perform oral motor strengthening exercises, facial/cheek, lip, tongue, strength/ROM, mastication/bolus manipulation, other (see comments)  [2-3 times a day sets of 10-15] at 07/02/2023 1001   [place] with minimal cues (75-90% accuracy) at 07/02/2023 1001   Time Frame long-term goal (LTG), 4 weeks at 07/02/2023 1001          Anticipated Discharge Plan  home with assistance;home with home health    Expected length of stay: 21 days

## 2023-07-03 NOTE — PROGRESS NOTES
Physical Medicine and Rehabilitation Progress Note          Patient was seen and examined.   Attestation Notes: Face to face encounter completed    Subjective     No acute events overnight.  Patient seen and examined this morning in room.  History limited by cog/communication impairment.  She appears comfortable.    SLP noting severe expressive more than receptive aphasia, apraxia, mild to moderate oropharyngeal dysphagia. Doing well from mobility standpoint, touching/steadying assist for transfers, touching/standing assist for gait 200 feet    Review of Systems:  Negative except as per HPI    Objective     Vital signs in last 24 hours:  Temp:  [36.8 °C (98.2 °F)-36.9 °C (98.5 °F)] 36.8 °C (98.2 °F)  Heart Rate:  [60-66] 64  Resp:  [16-18] 18  BP: (138-172)/(60-78) 147/75    Physical Exam      General Appearance:        Alert, cooperative, no distress   Head:    Normocephalic,atraumatic   Eyes:    Conjunctiva clear      Lungs:     Clear to auscultation bilaterally, respirations unlabored   Heart:    Regular rate and rhythm, no murmur   Abdomen:     Soft, non-tender, bowel sounds active   Extremities:     Extremities normal, atraumatic, no cyanosis or edema           Skin:   No rashes or lesions   Neurologic:          Behavior/  Emotional:   Awake, alert, aphasic, verbal output limited to incoherent sounds.  She is able to follow simple commands with increased time.  Yes/no seems reliable for basic questions-able to correctly choose first name and location from field of 2.  0/5 with wrist extension, , finger abduction on right.      Appropriate, cooperative                  Results from last 7 days   Lab Units 07/02/23  0603 07/01/23  0357 06/30/23  0235 06/29/23  0626 06/28/23  0606 06/27/23  0624 06/26/23  1524   WBC K/uL 4.83 4.74 4.88 5.36 7.64 8.34 9.44   RBC M/uL 3.76* 3.62* 3.67* 3.97 4.52 4.58 5.16   HEMOGLOBIN g/dL 11.4* 11.2* 11.3* 12.4 13.9 14.3 15.8*   HEMATOCRIT % 34.6* 33.4* 34.3* 36.8 42.5 43.0  47.3*   PLATELETS K/uL 140* 125* 127* 142* 164 163 177   MCV fL 92.0 92.3 93.5 92.7 94.0 93.9 91.7   RDW % 12.8 12.7 13.0 13.0 13.2 13.1 12.9   EOS ABS AUTO K/uL  --   --   --   --  0.07  --  0.02*       Results from last 7 days   Lab Units 07/03/23  0542 07/02/23  0603 06/28/23  0606 06/27/23  0624 06/26/23  1524   SODIUM mEQ/L 140 141 142 141 142   POTASSIUM mEQ/L 3.2* 3.0* 3.5 3.4* 4.1   CHLORIDE mEQ/L 108* 109* 112* 111* 111*   CO2 mEQ/L 25 24 19* 23 21*   BUN mg/dL 6* 6* 12 13 14   CREATININE mg/dL 0.6 0.6 0.7 0.8 0.8   EGFR mL/min/1.73m*2 >60.0 >60.0 >60.0 >60.0 >60.0   ANION GAP mEQ/L 7 8 11 7 10       Current Facility-Administered Medications   Medication Dose Route Frequency   • acetaminophen  650 mg oral q6h PRN   • apixaban  5 mg oral BID   • cycloSPORINE  1 drop Both Eyes q12h ROBERTO   • melatonin  3 mg oral Nightly   • pantoprazole  40 mg oral Daily   • rosuvastatin  20 mg oral Nightly   • senna  1 tablet oral 2x daily PRN   • trazodone  25 mg oral Nightly       Plan of care was discussed with patient                    * Acute ischemic left MCA stroke (CMS/[facility])  Assessment & Plan  The patient is a 78-year-old  female with a history of AAA, heart block s/p cardiac pacemaker, glaucoma, hyperlipidemia, coronary artery disease, NSTEMI, who presented to [facility] on June 26 after she has not been seen by her friends for a few days.   In the emergency department she was aphasic with right-sided weakness and hypertensive to the 190 systolic.  CT scan of the head revealed an acute infarct in left MCA with mild bleeding.  Echo showed an EF of 43% and a bubble study was negative.  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  Later this was transitioned to apixaban.        -Full program PT/OT/SLP/rehabilitation RN/psychology. Consult internal medicine for comorbidities.  -Continue apixaban  -Continue statin  -Sleep -  melatonin and trazodone  -Safety - one-to-one continuous observation, low bed, lapbelt.  Wean restraints as able    Disposition -anticipate patient will need 24/7 supervision for safe discharge          Right wrist drop  Assessment & Plan  Splinting, contracture prevention    Left ventricular apical thrombus  Assessment & Plan  Continue apixaban    Coronary artery disease involving native heart without angina pectoris  Assessment & Plan  Continue apixaban    Glaucoma  Assessment & Plan  Continue eye drops    Follow-up exam  Assessment & Plan  PCP  Cardiology Dr. Lorenzo for her cardiac function and anticoagulation treatment  Neurology

## 2023-07-04 ENCOUNTER — APPOINTMENT (INPATIENT)
Dept: OTHER | Facility: REHABILITATION | Age: 78
DRG: 057 | End: 2023-07-04
Payer: MEDICARE

## 2023-07-04 ENCOUNTER — APPOINTMENT (INPATIENT)
Dept: SPEECH THERAPY | Facility: REHABILITATION | Age: 78
DRG: 057 | End: 2023-07-04
Payer: MEDICARE

## 2023-07-04 ENCOUNTER — APPOINTMENT (INPATIENT)
Dept: OCCUPATIONAL THERAPY | Facility: REHABILITATION | Age: 78
DRG: 057 | End: 2023-07-04
Payer: MEDICARE

## 2023-07-04 LAB
ANION GAP SERPL CALC-SCNC: 8 MEQ/L (ref 3–15)
BUN SERPL-MCNC: 7 MG/DL (ref 7–25)
CALCIUM SERPL-MCNC: 8.3 MG/DL (ref 8.6–10.3)
CHLORIDE SERPL-SCNC: 107 MEQ/L (ref 98–107)
CO2 SERPL-SCNC: 26 MEQ/L (ref 21–31)
CREAT SERPL-MCNC: 0.6 MG/DL (ref 0.6–1.2)
GFR SERPL CREATININE-BSD FRML MDRD: >60 ML/MIN/1.73M*2
GLUCOSE SERPL-MCNC: 90 MG/DL (ref 70–99)
POTASSIUM SERPL-SCNC: 3.2 MEQ/L (ref 3.5–5.1)
SODIUM SERPL-SCNC: 141 MEQ/L (ref 136–145)

## 2023-07-04 PROCEDURE — 63700000 HC SELF-ADMINISTRABLE DRUG: Performed by: STUDENT IN AN ORGANIZED HEALTH CARE EDUCATION/TRAINING PROGRAM

## 2023-07-04 PROCEDURE — 92526 ORAL FUNCTION THERAPY: CPT | Mod: GN

## 2023-07-04 PROCEDURE — 12800000 HC ROOM AND CARE SEMIPRIVATE REHAB

## 2023-07-04 PROCEDURE — 36415 COLL VENOUS BLD VENIPUNCTURE: CPT | Performed by: HOSPITALIST

## 2023-07-04 PROCEDURE — 63700000 HC SELF-ADMINISTRABLE DRUG: Performed by: HOSPITALIST

## 2023-07-04 PROCEDURE — 97530 THERAPEUTIC ACTIVITIES: CPT | Mod: GO,59

## 2023-07-04 PROCEDURE — 80048 BASIC METABOLIC PNL TOTAL CA: CPT | Performed by: HOSPITALIST

## 2023-07-04 RX ORDER — POTASSIUM CHLORIDE 750 MG/1
40 TABLET, EXTENDED RELEASE ORAL 3 TIMES DAILY
Status: DISCONTINUED | OUTPATIENT
Start: 2023-07-05 | End: 2023-07-10

## 2023-07-04 RX ADMIN — Medication 3 MG: at 20:08

## 2023-07-04 RX ADMIN — POTASSIUM CHLORIDE 40 MEQ: 750 TABLET, EXTENDED RELEASE ORAL at 20:11

## 2023-07-04 RX ADMIN — CYCLOSPORINE 1 DROP: 0.5 EMULSION OPHTHALMIC at 08:43

## 2023-07-04 RX ADMIN — APIXABAN 5 MG: 5 TABLET, FILM COATED ORAL at 08:43

## 2023-07-04 RX ADMIN — APIXABAN 5 MG: 5 TABLET, FILM COATED ORAL at 20:08

## 2023-07-04 RX ADMIN — Medication 400 MG: at 08:43

## 2023-07-04 RX ADMIN — ROSUVASTATIN CALCIUM 20 MG: 20 TABLET, FILM COATED ORAL at 20:11

## 2023-07-04 RX ADMIN — CYCLOSPORINE 1 DROP: 0.5 EMULSION OPHTHALMIC at 20:08

## 2023-07-04 RX ADMIN — Medication 400 MG: at 20:11

## 2023-07-04 RX ADMIN — TRAZODONE HYDROCHLORIDE 25 MG: 50 TABLET, FILM COATED ORAL at 20:08

## 2023-07-04 RX ADMIN — ACETAMINOPHEN 650 MG: 325 TABLET, FILM COATED ORAL at 20:10

## 2023-07-04 RX ADMIN — PANTOPRAZOLE SODIUM 40 MG: 40 TABLET, DELAYED RELEASE ORAL at 08:43

## 2023-07-04 NOTE — PROGRESS NOTES
Patient: Jennifer Sams  Location: [facility] [address]  MRN: [id number]  Today's date: 7/4/2023    History of Present Illness  Jennifer is a 78 y.o. female admitted on 7/1/2023 with Acute ischemic left MCA stroke (CMS/HCC) [I63.512]. Principal problem is Acute ischemic left MCA stroke (CMS/HCC).    The patient is a 78-year-old  female with a history of AAA, heart block s/p cardiac pacemaker, glaucoma, hyperlipidemia, coronary artery disease, NSTEMI, who presented to [facility] on June 26 after she has not been seen by her friends for a few days.  Patient lives alone and when she was found by EMS she was confused and combative.  In the emergency department she was aphasic with right-sided weakness and hypertensive to the 190 systolic.  CT scan of the head revealed an acute infarct in left MCA with mild bleeding.  Echo showed an EF of 43% and a bubble study was negative.  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  Later this was transitioned to apixaban.  She was continued on statin for secondary prevention.  She was evaluated by speech therapy and cleared for a soft and bite-size diet with moderately thick liquids.    7/2/2023:  Diet SB6 with mildly thick liquids (MT2)    Past Medical History  Jennifer has a past medical history of AAA (abdominal aortic aneurysm), Arthritis, Elevated blood pressure reading without diagnosis of hypertension (4/19/2019), Epistaxis (1/6/2020), GERD (gastroesophageal reflux disease) (4/19/2019), Glaucoma (4/19/2019), Heart murmur, Hiatal hernia, History of hip replacement, total, right (4/19/2019), History of rheumatic fever as a child (4/19/2019), Hypercholesterolemia (4/19/2019), Ischemic cardiomyopathy (5/9/2019), Kidney cysts, Osteoarthritis of multiple joints (4/19/2019), Osteoporosis, Pacemaker (12/9/2019), Raynaud's disease (4/19/2019), 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        SLP Pain    Date/Time Pain Type Rating: Rest Who   07/04/23 1135 Pain Assessment 0 - no pain CWB   07/04/23 1155 Pain Reassessment 0 - no pain CWB          Prior Living Environment    Flowsheet Row Most Recent Value   People in Home alone   Current Living Arrangements home   Home Accessibility --  [2 SH with 1 DARÍO]   Living Environment Comment Per chart review, pt lives alone in a 2SH with 1STE   Number of Stairs, Main Entrance 1   Number of Stairs, Within Home, Primary 12  [Full fight to second floor]          Prior Level of Function    Flowsheet Row Most Recent Value   Dominant Hand --  [Unable to determine 2/2 cognitive/communication deficits]   Ambulation independent   Transferring independent   Toileting independent   Bathing independent   Dressing independent   Eating independent   IADLs independent   Communication understands/communicates without difficulty   Swallowing swallows foods/liquids without difficulty   Baseline Diet/Method of Nutritional Intake no diet restrictions   Past History of Dysphagia no   Prior Level of Function Comment Per chart review: PMH SB6,  MT2,  No VFSS in EMR.  Pt seen by SLP at [place].   Assistive Device Currently Used at Home --  [Per chart review pt has RW at home - unclear if pt was using AD and pt unable to state PLOF at this time]           07/04/23 1135   SLP Time Calculation   Start Time 1130   Stop Time 1200   Time Calculation (min) 30 min   Session Details   Document Type Daily Treatment/Progress Note   Mode of Treatment speech language pathology;individual therapy   General Information   Patient Profile Reviewed yes   General Observations of Patient pt with flatter affect compared with yesterday; just waking up from nap   Food and Liquid Trials (NIS)   Patient Positioning upright in wheelchair   Oral Intake/Feeding  "Performance oral trials administered by therapist;slow/difficult self-feeding;physical limitations;cognitive limitations   Thin Liquids patient-controlled amounts;multiple consecutive cup sips;sips from cup   Comment, Thin Liquids 2 oz of apple juice; pt with impulsive rate control but appeared to be taking only small sip sizes   Food Consistencies Evaluated pureed (PU4);soft and bite-sized (SB6);easy to chew (EC7);regular   Comment, Pureed (PU4) refused mashed potatoes appearing to gag when presented; difficulty using spoon to scoop \"magic cup\" ice cream; when this was placed on spoon for patient, pt then able to place spoon in mouth;   Comment, Soft and Bite-Sized (SB6) incoordinated bolus retrieval; pt sucked on cubed meat an then refused   Comment, Easy to Chew (EC7) grilled cheese; pt sucked on handheld sandwich and non-reponsive to cues to \"bite\"; when provided with bite sized piece of sandwich, pt orally held and sucked on bolus and then spit it out   Comment, Regular pt refused bites of fresh fruit   Comment, Oral Phase decreased motor planning for bolus retrieval and oral prep/chewing of limited trials of SBS6 and EC7 items; pt spitting these items out;   Pharyngeal Phase of Swallow uncoordinated swallow   Comment, Pharyngeal Phase 1 delayed cough following sips of thin liquids (sxs of aspiration less than 10%)   Comment 1:1 reports pt ate nothing at breakfast; today, pt ate only 3/4 serving of magic cup; Nursing and MDs notified to consider order for calorie counts   Daily Progress Summary (SLP)   Daily Outcome Statement Pt with poor coordination for mastication/clearing of SBS6, EC7 trials today and continues with poor appetite/low intake. Recommend close monitoring of nutrition/hydration and 1:1 Assist to help pt use utensils to eat. Pt does best with pureed items and these should be included on all meal trays in addition to SBS6. Pt with minimal sxs of aspiration with aggressive trials of thins in tx " despite rapid/incoordinated self feeding with these; If pt continues to exhibit minimal sxs of aspriaton with thins in upcoming sessions, recommend liquid advanxement to this level per primary SLP; Recommend to facilitate increased oral awareness/coordination via activities such as toothbrushing; lollipop exploration and continued trials of high preference advanced textured foods with mirror feedback for improved masticiation.            IRF SLP Goals    Flowsheet Row Most Recent Value   Auditory Comprehension Goal 1    Auditory Comprehension Goal 1 pt will complete basic Aud comprehension tasks (simple yes/no 75%),  1 step commands 50% with max cues,  match word heard to picture f/o 2 50% max cues at 07/02/2023 1001   Time Frame short-term goal (STG), 1 week at 07/02/2023 1001   Progress/Outcome goal ongoing at 07/04/2023 1510   Auditory Comprehension Goal 2    Auditory Comprehension Goal 2 Pt will understand mod-complex level y/n questions 90% min cues,  2 step commands 75% min-mod cues,  at 07/02/2023 1001   Time Frame long-term goal (LTG), 4 weeks at 07/02/2023 1001   Progress/Outcome goal ongoing at 07/04/2023 1510   Verbal Expression Goal 1    Verbal Expression Goal 1 Pt will complete basic VE tasks (name. automatics, simple opposites, naming) with 20% accuracy and max cues. at 07/02/2023 1001   Time Frame short-term goal (STG), 1 week at 07/02/2023 1001   Progress/Outcome goal ongoing at 07/04/2023 1510   Verbal Expression Goal 2    Verbal Expression Goal 2 Pt will complete Verbal expression tasks (basic-mod) with 50-75% accuracy and min-mod cues.  Pt will imitate vowels 50% max cues,  at 07/02/2023 1001   Time Frame long-term goal (LTG), 4 weeks at 07/02/2023 1001   Progress/Outcome goal ongoing at 07/04/2023 1510   Motor Speech/Voice Goal 1    Motor Speech/Voice Goal 1 Repetition of vowels, CV/VC and CVC targets with max cues and models for 60% acc. at 07/04/2023 1510   Time Frame short-term goal (STG), 2  weeks at 07/04/2023 1510   Progress/Outcome goal ongoing at 07/04/2023 1510   Motor Speech/Voice Goal 2    Motor Speech/Voice Goal 2 Repetition of vowels, CV/VC and CVC targets with min cues and models for 90% acc. at 07/04/2023 1510   Time Frame long-term goal (LTG), 4 weeks at 07/04/2023 1510   Progress/Outcome goal ongoing at 07/04/2023 1510   Reading Comprehension Goal 1    Reading Comprehension Goal 1 Pt will match word to picture field of 2 with 50% accuracy and mod-max cues,  Pt will read single functional words with 20% accuracy max cues at 07/02/2023 1001   Time Frame short-term goal (STG), 1 week at 07/02/2023 1001   Progress/Outcome goal no longer appropriate at 07/04/2023 1510   Reading Comprehension Goal 2    Reading Comprehension Goal 2 Pt will read simple sentences and comprehend simple sentences with 75% accuracy min-mod cues at 07/02/2023 1001   Time Frame long-term goal (LTG) at 07/02/2023 1001   Progress/Outcome goal no longer appropriate at 07/04/2023 1510   Written Expression Goal 1    Written Expression Goal 1 Pt will copy first name, shapes, letters and 3-4 letter words with 80% min cues,  pt will copy letters, name shapes with 50% accuracy and mod cues. at 07/02/2023 1001   Time Frame short-term goal (STG), 1 week at 07/02/2023 1001   Progress/Outcome goal no longer appropriate at 07/04/2023 1510   Written Expression Goal 2    Written Expression Goal 2 Pt will write first and last name,  single words 90% accuracy at 07/02/2023 1001   Progress/Outcome goal no longer appropriate at 07/04/2023 1510   Oral Nutrition/Hydration Goal 1    Activity effective/safe, use of swallowing techniques, management of texture/viscosity, other (see comments)  [SB6,  MT2.  Trials of thins and various textures by speech with min s/s of aspiration] at 07/02/2023 1001   Time Frame short-term goal (STG), 14 days or less at 07/02/2023 1001   Progress/Outcome goal ongoing at 07/04/2023 1510   Oral Nutrition/Hydration  Goal 2    Activity effective/safe, oral nutrition/hydration, use of swallowing techniques, management of texture/viscosity, other (see comments)  [regular and thins] at 07/02/2023 1001   Time Frame long-term goal (LTG), 4 weeks at 07/02/2023 1001   Progress/Outcome goal ongoing at 07/04/2023 1510   Oral Motor Exercise Goal 1    Activity perform oral motor strengthening exercises, facial/cheek, lip, tongue, strength/ROM, mastication/bolus manipulation, 5-10 times per session, other (see comments)  [use mirror/visual model] at 07/02/2023 1001   [place] with 1:1 supervision/constant cues at 07/02/2023 1001   Time Frame short-term goal (STG), 14 days or less at 07/02/2023 1001   Progress/Outcome goal ongoing at 07/04/2023 1510   Oral Motor Exercise Goal 2    Activity perform oral motor strengthening exercises, facial/cheek, lip, tongue, strength/ROM, mastication/bolus manipulation, other (see comments)  [2-3 times a day sets of 10-15] at 07/02/2023 1001   [place] with minimal cues (75-90% accuracy) at 07/02/2023 1001   Time Frame long-term goal (LTG), 4 weeks at 07/02/2023 1001   Progress/Outcome goal ongoing at 07/04/2023 1510

## 2023-07-04 NOTE — PLAN OF CARE
Plan of Care Review  Plan of Care Reviewed With: patient  Progress: improving  Outcome Evaluation: Alert and able to make needs known. Denies discomfort, impulsive with transfers and occasionally unsteady, cooperative with gait belt and took several naps inbetween therapy. Patient indicated that she didn't sleep well last night D/T frequent inturruptions from 1:1. Patient's affect improved after getting rest, minimal intake for breakfast and lunch, consumed abt 25% of dinner.

## 2023-07-04 NOTE — PLAN OF CARE
Pt refused to take both Potassium and Magnesium. Pt shook head no adamantly and make a gagging motion. Pt took all her other pills without difficulty. I broke them in half, offered to put them in pudding or applesauce, but pt still refused. I crushed the meds and pt took a small bit from the spoon and refused to take the rest.

## 2023-07-04 NOTE — PLAN OF CARE
Problem: Rehabilitation (IRF) Plan of Care  Goal: Plan of Care Review  Flowsheets (Taken 7/4/2023 3519)  Plan of Care Reviewed With: patient  Outcome Evaluation: Plan of care established for music therapy services. Therapist will continue to see pt 1-2x a week for 30-60min sessions.

## 2023-07-04 NOTE — PROGRESS NOTES
CREATIVE ARTS SESSION - MUSIC THERAPY        Patient:  Jennifer Sams  Location:  [facility] [address]  MRN:  [id number]  Today's date:  7/4/2023      Admitting Diagnosis: Acute ischemic left MCA stroke (CMS/HCC) [I63.512]       Session Details:   Session Type: Initial   Co-treatment: No  Reason for Referral:  Non-verbal    Start time: 900    End time:  930   Total Time:  30'       General Observations of Patient:   Start: Pt received seated in wheelchair; patient was agreeable to therapy  End: Pt seated in wheelchair at end of session; all needs met      OBJECTIVE     Session Goals:   Improve mood  Improve quality of life  Increase stress reduction / relaxation   Provide patient education regarding music therapy services     Affect/Mood:      [x] Calm  [x] Anxious  [] Sad   [] Hopeful  [] Hopeless  [] Tearful   [] Bright  [] Agitated  [] Other (comment):        Music Preferences / Music Experiences:  50-60s, rock, disco, classical motown. Ain't no mountain high enough, Sheldon Alejo    Interventions provided during session:   Live music listening, Music-assisted relaxation , Presentation of familiar music, Verbal processing      Patient Response to Interventions:   Physical response:  Nodded head, Smiled        ASSESSMENT AND PLAN       Progress Summary:   Therapist met pt in room with 1:1 staff. Therap[facility] with [facility]1 staff member present for support. Pt expressed interest in music therapy. Therapist asked questions to process use of music therapy in pt’s treatment and ongoing care. Pt is receiving treatment for stroke. Pt reported music preferences and pointing with a visual aid and some music background playing guitar. Pt tried to sing and hum at times. Pt and MT set goals for music listening to improve quality of life, mood, relaxation and reduce stress. Pt was agreeable to music listening for a time moving to the beat at times but  ended the session early due to discomfort.  Pt was brought back to room by 1:1 staff.      Follow Up:  Plan of care established for music therapy services. Therapist will continue to see pt 1-2x a week for 30-60min sessions.    Kristi Nettles M.A., MT-BC

## 2023-07-04 NOTE — PROGRESS NOTES
Patient: Jennifer Sams  Location: [facility] [address]  MRN: [id number]  Today's date: 7/4/2023    History of Present Illness  Jennifer is a 78 y.o. female admitted on 7/1/2023 with Acute ischemic left MCA stroke (CMS/HCC) [I63.512]. Principal problem is Acute ischemic left MCA stroke (CMS/HCC).    The patient is a 78-year-old  female with a history of AAA, heart block s/p cardiac pacemaker, glaucoma, hyperlipidemia, coronary artery disease, NSTEMI, who presented to [facility] on June 26 after she has not been seen by her friends for a few days.  Patient lives alone and when she was found by EMS she was confused and combative.  In the emergency department she was aphasic with right-sided weakness and hypertensive to the 190 systolic.  CT scan of the head revealed an acute infarct in left MCA with mild bleeding.  Echo showed an EF of 43% and a bubble study was negative.  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  Later this was transitioned to apixaban.  She was continued on statin for secondary prevention.  She was evaluated by speech therapy and cleared for a soft and bite-size diet with moderately thick liquids.    7/2/2023:  Diet SB6 with mildly thick liquids (MT2)    Past Medical History  Jennifer has a past medical history of AAA (abdominal aortic aneurysm), Arthritis, Elevated blood pressure reading without diagnosis of hypertension (4/19/2019), Epistaxis (1/6/2020), GERD (gastroesophageal reflux disease) (4/19/2019), Glaucoma (4/19/2019), Heart murmur, Hiatal hernia, History of hip replacement, total, right (4/19/2019), History of rheumatic fever as a child (4/19/2019), Hypercholesterolemia (4/19/2019), Ischemic cardiomyopathy (5/9/2019), Kidney cysts, Osteoarthritis of multiple joints (4/19/2019), Osteoporosis, Pacemaker (12/9/2019), Raynaud's disease (4/19/2019), 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        OT Vitals    Date/Time Pulse HR Source BP BP Location BP Method Pt Position [facility]   07/04/23 1305 71 Monitor 132/63 Left upper arm Automatic Sitting DT      OT Pain    Date/Time Pain Type Rating: Rest Rating: Activity [facility]   07/04/23 1305 Pain Assessment 0-->no hurt 0-->no hurt DT   07/04/23 1328 Post Activity 0-->no hurt 0-->no hurt DT          Prior Living Environment    Flowsheet Row Most Recent Value   People in Home alone   Current Living Arrangements home   Home Accessibility --  [2 SH with 1 DARÍO]   Living Environment Comment Per chart review, pt lives alone in a 2SH with 1STE   Number of Stairs, Main Entrance 1   Number of Stairs, Within Home, Primary 12  [Full fight to second floor]          Prior Level of Function    Flowsheet Row Most Recent Value   Dominant Hand --  [Unable to determine 2/2 cognitive/communication deficits]   Ambulation independent   Transferring independent   Toileting independent   Bathing independent   Dressing independent   Eating independent   IADLs independent   Communication understands/communicates without difficulty   Swallowing swallows foods/liquids without difficulty   Baseline Diet/Method of Nutritional Intake no diet restrictions   Past History of Dysphagia no   Prior Level of Function Comment Per chart review: PMH SB6,  MT2,  No VFSS in EMR.  Pt seen by SLP at [place].   Assistive Device Currently Used at Home --  [Per chart review pt has RW at home - unclear if pt was using AD and pt unable to state PLOF at this time]          Occupational Profile    Flowsheet Row Most Recent Value   Occupational History/Life Experiences Will need to clarify PLOF/driving status. Pt unable to state 2/2 cognitive/communication deficits.   Environmental Supports and Barriers Per chart review, pt lives alone but has supportive friends.    Patient Goals Pt unable to state goal 2/2 communication deficits.           IRF OT Evaluation and Treatment - 07/04/23 1305        OT Time Calculation    Start Time 1300     Stop Time 1330     Time Calculation (min) 30 min        Session Details    Document Type Daily Treatment/Progress Note     Mode of Treatment individual therapy;occupational therapy        Mobility Belt    Mobility Belt Used for All Out of Bed Activity no     Reason Mobility Belt Not Used patient refused        Cognition/Psychosocial    Comment, Cognition pt expressing through gentures difficultiies with minimial movement of R wrist/hand, therapist provided support. Difficulties attending to AAROM exercises due to language impairments. Noted pt dancing in her chair to music playing in the gym.        Vision Assessment/Intervention    Impact of Vision Impairment on Function while ambulating, pt required MAX cues to faciliate R gaze/turn R while ambulatign on unit.        Prism Adaptation (Neglect)    Prism Goggle Base Direction with 20 Prism Diopter Bilaterally Base Right     Achieved 90 Targeted Motor Movements? no     Reasons --   pt refusing- shaking head no/waving hand no at therapist       Sit to Stand Transfer    [place], Sit to Stand Transfer touching/steadying assist     Safety/Cues impulsivity     Assistive Device none        Stand to Sit Transfer    [place], Stand to Sit Transfer touching/steadying assist     Safety/Cues impulsivity     Assistive Device none        Stand Pivot Transfer    [place], Stand Pivot/Stand Step Transfer touching/steadying assist     Safety/Cues impulsivity     Assistive Device none     Comment ambulatory approach to and from w/c and standard chair        Impairments/Safety Issues    Comment, Safety Issues/Impairments steadying assist ambulating s AD on unit- required MAX verbal/tactile cues to intiaite R turns.        Grooming    Tasks washes, rinses and dries hands     [place]  touching/steadying assist     Safety/Cues impulsivity     Position unsupported standing;sink side     Adaptive Equipment none     Comment [place] to initiate washing R hand with L        Daily Progress Summary (OT)    Daily Outcome Statement Attempted to initaited prism adaptation- however pt declined. Briana focused on HHM and improving R attention. Recommending R hand orthotic for night time wear. Lap belt donned and handoff to 1:1 at end of session.                           IRF OT Goals    Flowsheet Row Most Recent Value   Transfer Goal 1    Activity/Assistive Device toilet at 07/02/2023 0711   [place] --  [Cl S] at 07/02/2023 0711   Time Frame short-term goal (STG), 5 - 7 days at 07/02/2023 0711   Transfer Goal 2    Activity/Assistive Device toilet at 07/02/2023 0711   [place] supervision required at 07/02/2023 0711   Time Frame long-term goal (LTG), 14 days or less at 07/02/2023 0711   Transfer Goal 3    Activity/Assistive Device shower at 07/02/2023 0711   [place] --  [Cl S] at 07/02/2023 0711   Time Frame short-term goal (STG), 5 - 7 days at 07/02/2023 0711   Transfer Goal 4    Activity/Assistive Device shower at 07/02/2023 0711   [place] supervision required at 07/02/2023 0711   Time Frame long-term goal (LTG), 14 days or less at 07/02/2023 0711   Bathing Goal 1    [place] --  [Steadying A] at 07/02/2023 0711   Time Frame short-term goal (STG), 5 - 7 days at 07/02/2023 0711   Bathing Goal 2    [place] supervision required at 07/02/2023 0711   Time Frame long-term goal (LTG), 14 days or less at 07/02/2023 0711   UB Dressing Goal 1    [place] --  [Steadying A] at 07/02/2023 0711   Time Frame short-term goal (STG), 5 - 7 days at 07/02/2023 0711   UB Dressing Goal 2    [place] set-up required at 07/02/2023 0711   Time Frame long-term goal (LTG), 14 days or less at 07/02/2023 0711   LB Dressing Goal 1    [place] --  [Steadying A] at 07/02/2023 0711   Time Frame short-term  goal (STG), 5 - 7 days at 07/02/2023 0711   LB Dressing Goal 2    [place] supervision required at 07/02/2023 0711   Time Frame long-term goal (LTG), 14 days or less at 07/02/2023 0711   Grooming Goal 1    [place] --  [TBA] at 07/02/2023 0711   Grooming Goal 2    [place] set-up required at 07/02/2023 0711   Time Frame long-term goal (LTG), 14 days or less at 07/02/2023 0711   Toileting Goal 1    [place] --  [Steadying A] at 07/02/2023 0711   Time Frame short-term goal (STG), 5 - 7 days at 07/02/2023 0711   Toileting Goal 2    [place] supervision required at 07/02/2023 0711   Time Frame long-term goal (LTG), 14 days or less at 07/02/2023 0711         [Follow-Up] : a follow-up visit  [Spouse] : spouse [FreeTextEntry3] : DDRTx [FreeTextEntry5] : 3/9/2021

## 2023-07-04 NOTE — NURSING NOTE
Pt refusing to allow staff to lower floor bed to the ground. Nursing supervisor notified and saw patient but she continued to refuse bed to be lowered to the floor and refused to change to our traditional bed. 1:1 sitter instructed to sit within arms reach of pt to maintain safety.

## 2023-07-04 NOTE — PLAN OF CARE
Plan of Care Review  Plan of Care Reviewed With: patient  Progress: improving  Outcome Evaluation: Alert but has severe expressive aphagia. Pt refused meds for evening RN and refused to allow 1:1 to lower her bed to the floor overnight and resists wearing her gait belt. BMP drawn this AM to recheck K and Mg. Continent of bowel and bladder.

## 2023-07-05 ENCOUNTER — APPOINTMENT (INPATIENT)
Dept: PHYSICAL THERAPY | Facility: REHABILITATION | Age: 78
DRG: 057 | End: 2023-07-05
Payer: MEDICARE

## 2023-07-05 ENCOUNTER — APPOINTMENT (INPATIENT)
Dept: OCCUPATIONAL THERAPY | Facility: REHABILITATION | Age: 78
DRG: 057 | End: 2023-07-05
Payer: MEDICARE

## 2023-07-05 ENCOUNTER — APPOINTMENT (INPATIENT)
Dept: SPEECH THERAPY | Facility: REHABILITATION | Age: 78
DRG: 057 | End: 2023-07-05
Payer: MEDICARE

## 2023-07-05 PROCEDURE — 63700000 HC SELF-ADMINISTRABLE DRUG: Performed by: HOSPITALIST

## 2023-07-05 PROCEDURE — 63700000 HC SELF-ADMINISTRABLE DRUG: Performed by: STUDENT IN AN ORGANIZED HEALTH CARE EDUCATION/TRAINING PROGRAM

## 2023-07-05 PROCEDURE — 97116 GAIT TRAINING THERAPY: CPT | Mod: GP

## 2023-07-05 PROCEDURE — 92507 TX SP LANG VOICE COMM INDIV: CPT | Mod: GN

## 2023-07-05 PROCEDURE — 12800000 HC ROOM AND CARE SEMIPRIVATE REHAB

## 2023-07-05 PROCEDURE — 92526 ORAL FUNCTION THERAPY: CPT | Mod: GN

## 2023-07-05 PROCEDURE — 97535 SELF CARE MNGMENT TRAINING: CPT | Mod: GO

## 2023-07-05 PROCEDURE — 97530 THERAPEUTIC ACTIVITIES: CPT | Mod: GP,59

## 2023-07-05 RX ADMIN — PANTOPRAZOLE SODIUM 40 MG: 40 TABLET, DELAYED RELEASE ORAL at 09:03

## 2023-07-05 RX ADMIN — POTASSIUM CHLORIDE 40 MEQ: 750 TABLET, EXTENDED RELEASE ORAL at 20:06

## 2023-07-05 RX ADMIN — Medication 3 MG: at 20:06

## 2023-07-05 RX ADMIN — POTASSIUM CHLORIDE 40 MEQ: 750 TABLET, EXTENDED RELEASE ORAL at 09:03

## 2023-07-05 RX ADMIN — ROSUVASTATIN CALCIUM 20 MG: 20 TABLET, FILM COATED ORAL at 20:06

## 2023-07-05 RX ADMIN — Medication 400 MG: at 09:03

## 2023-07-05 RX ADMIN — POTASSIUM CHLORIDE 40 MEQ: 750 TABLET, EXTENDED RELEASE ORAL at 16:07

## 2023-07-05 RX ADMIN — APIXABAN 5 MG: 5 TABLET, FILM COATED ORAL at 20:06

## 2023-07-05 RX ADMIN — CYCLOSPORINE 1 DROP: 0.5 EMULSION OPHTHALMIC at 20:06

## 2023-07-05 RX ADMIN — APIXABAN 5 MG: 5 TABLET, FILM COATED ORAL at 09:03

## 2023-07-05 RX ADMIN — CYCLOSPORINE 1 DROP: 0.5 EMULSION OPHTHALMIC at 09:03

## 2023-07-05 RX ADMIN — Medication 400 MG: at 20:06

## 2023-07-05 RX ADMIN — TRAZODONE HYDROCHLORIDE 25 MG: 50 TABLET, FILM COATED ORAL at 20:06

## 2023-07-05 NOTE — PATIENT CARE CONFERENCE
Inpatient Rehabilitation Team Conference Note  Date: 7/5/2023  Time: 9:18 AM       Patient Name: Jennifer Sams     Medical Record Number: [id number]   YOB: 1945  Sex: Female      Room/Bed: 206/206W  Payor Info: Payor: MEDICARE / Plan: MEDICARE PART A & B / Product Type: Medicare /     Admitting Diagnosis: Acute ischemic left MCA stroke (CMS/[facility]) [I63.512]   Admit Date/Time: 7/1/2023 10:47 AM    Principal Problem: Acute ischemic left MCA stroke (CMS/[facility])    Patient Active Problem List    Diagnosis Date Noted   • Acute ischemic left MCA stroke (CMS/[facility]) 07/01/2023   • Follow-up exam 07/01/2023   • Right wrist drop 07/01/2023   • Left ventricular apical thrombus 06/28/2023   • Cerebrovascular accident (CVA), unspecified mechanism (CMS/[facility]) 06/26/2023   • Elevated blood pressure reading with diagnosis of hypertension 06/26/2023   • Lumbar degenerative disc disease 10/20/2022   • Lumbar facet arthropathy 10/20/2022   • Mixed hyperlipidemia 04/21/2022   • Dizziness 04/21/2022   • Coronary artery disease involving native heart without angina pectoris 04/09/2022   • Chronic combined systolic and diastolic heart failure (CMS/[facility]) 02/17/2022   • Other headache syndrome 02/17/2022   • Seasonal allergies 12/13/2021   • Raynaud's phenomenon 04/16/2021   • Dyspnea on exertion 01/26/2021   • Chest pain on breathing 01/26/2021   • Epistaxis 01/06/2020   • SOB (shortness of breath) 12/10/2019   • Cardiac pacemaker 12/09/2019   • Mobitz type 2 second degree atrioventricular block 11/16/2019   • Coronary artery disease involving native coronary artery of native heart with angina pectoris (CMS/[facility]) 11/13/2019   • Chest pain 11/12/2019   • Ischemic cardiomyopathy 05/09/2019   • Status post insertion of drug eluting coronary artery stent 05/09/2019   • NSTEMI (non-ST elevated myocardial infarction) (CMS/[facility]) 04/26/2019   • Hypercholesterolemia 04/19/2019   • Pseudoclaudication syndrome 04/19/2019   • History of  rheumatic fever as a child 04/19/2019   • Glaucoma 04/19/2019   • RSD (reflex sympathetic dystrophy) 04/19/2019   • Raynaud's disease 04/19/2019   • Osteoarthritis of multiple joints 04/19/2019   • History of hip replacement, total, right 04/19/2019   • GERD (gastroesophageal reflux disease) 04/19/2019   • Elevated blood pressure reading without diagnosis of hypertension 04/19/2019   • Abdominal aortic aneurysm (AAA) without rupture (CMS/[facility]) 04/19/2019       Premorbid Status:  Dominant Hand: -- (Unable to determine 2/2 cognitive/communication deficits)  Ambulation: independent  Transferring: independent  Toileting: independent  Bathing: independent  Dressing: independent  Eating: independent  IADLs: independent  Communication: understands/communicates without difficulty  Swallowing: swallows foods/liquids without difficulty  Baseline Diet/Method of Nutritional Intake: no diet restrictions  Past History of Dysphagia: no  Assistive Device/Animal Currently Used at Home: -- (Per chart review pt has RW at home - unclear if pt was using AD and pt unable to state PLOF at this time)  Prior Level of Function Comment: Per chart review: PMH SB6; MT2; No VFSS in EMR.  Pt seen by SLP at [place].      Current Functional Status:  Mobility  Gait  [place], Gait: touching/steadying assist  Assistive Device: none  Comment (Gait/Stairs): 2 gait trials as noted above: 300'x2 with steadying assist for balance.  Decreased R visual scanning noted.    Stairs  [place], Stairs: touching/steadying assist  Assistive Device: railing  Handrail Location (Stairs): left side (ascending); left side (descending)  Number of Stairs: 16  Stair Height: 7 inches  Comment: steadying assist for balance    Wheelchair  Method of Locomotion: bipedal (lower extremity) propulsion; [place] propulsion, left sided  [place], Forward Propulsion: moderate assist (50-74% patient effort)  [place], Steering Activities: moderate assist (50-74% patient  effort)  [place], Turning Activities: moderate assist (50-74% patient effort)  Distance Propelled in Feet: 15 feet  Comment, Wheelchair Mobility: Pt attempting to self propel w/c with BLE and LUE - chair height too high to propel with BLE; PT ModA while pt attempting to self-propel. [place]-height chair order to be placed to allow pt to self-propel w/c with 1:1 to assist with high activity level, affect, and mood    Transfers  Bed Mobility  Bed Mobility Activities: left; supine to sit; sit to supine  [place], Roll Left: close supervision  [place], Roll Right: close supervision  [place], Supine to Sit: close supervision  [place], Sit to Supine: close supervision  Safety/Cues: maximal; impulsivity; sequencing  Assistive Device: bed rails; head of bed elevated  Comment: Performed in pt's hospital bed; CSup due to impulsivity and decreased safety awareness    Bed to Chair Transfer  [place], Bed to Chair: touching/steadying assist  Safety/Cues: impulsivity; technique  Assistive Device: -- (none)  Comment: SPT between EOB and w/c; steadying assist for balance    Chair to Bed Transfer  [place], Chair to Bed: touching/steadying assist  Safety/Cues: impulsivity; technique  Assistive Device: none  Comment: SPT between w/c and EOB; steadying assist for balance    Sit to Stand Transfer  [place], Sit to Stand Transfer: touching/steadying assist  Safety/Cues: impulsivity  Assistive Device: none  Comment: From EOB and DME surfaces.    Stand to Sit Transfer  [place], Stand to Sit Transfer: touching/steadying assist  Safety/Cues: impulsivity  Assistive Device: none  Comment: To EOB and DME surfaces.    Stand Pivot Transfer  [place], Stand Pivot/Stand Step Transfer: touching/steadying assist  Safety/Cues: impulsivity  Assistive Device: none  Comment: Via amb approach.    Car Transfer  [place]: unable to assess  Comment: Unable to assess due to environmental limitations - due to  impaired safey awareness, distractability, impulsivity, and difficulty following commands, pt stayed on unit t/o session      Toilet Transfer  Transfer Technique: stand pivot  [place]: touching/steadying assist  Safety/Cues: impulsivity  Assistive Device: grab bars/safety frame  Comment: Via amb approach.    Shower Transfer  Transfer Technique: stand pivot  [place]: touching/steadying assist  Safety/Cues: impulsivity; technique  Assistive Device: shower chair; grab bars/tub rail  Comment: Via amb approach.      Self Care  Bathing  Tasks: chest; right arm; abdomen; front perineal area; buttocks; left upper leg; right upper leg; left lower leg, including foot; right lower leg, including foot  [place]: minimum assist (75% or more patient effort)  Safety/Cues: maximal; verbal cues; tactile cues; impulsivity; compensatory techniques; attention; [place]/one-handed technique; sequencing  Setup Assistance: adaptive equipment setup; adjust water temperature/flow; obtain supplies; open containers  Adaptive Equipment: shower chair; grab bar/tub rail; hand-held shower spray hose  Comment: Pt required Max multimodal cues to follow command to sit on shower chair during shower to optimize safety. Pt completed shower while seated on shower chair and standing c Steadying A/Min A for balance. Assist required to bathe/dry L UE. Max VCs for impulsivity, safety awraeness and sequencing.    Upper Body Dressing  Tasks: don; pull over garment  [place]: touching/steadying assist  Safety/Cues: impulsivity  Adaptive Equipment: none  Comment: Completed while seated EOB, Min A overall - assist c threading R UE.    Lower Body Dressing  Tasks: don; pants/bottoms; underwear; socks  [place]: touching/steadying assist  Safety/Cues: impulsivity  Adaptive Equipment: none  Comment: Pt able to thread B LE into underwear/pants while seated EOB. Pt then stands to manage over hips c Steadying A/Min A for balance and Min A to assist  minimally managing up on R side. Pt able to doff socks on own via cross-legged technique. Pt able to don L sock, assist required to don R sock. Min A overall footwear.    Grooming  Tasks: washes, rinses and dries hands  [place]: minimum assist (75% or more patient effort)  Safety/Cues: impulsivity  Setup Assistance: obtain supplies  Adaptive Equipment: none  Comment: [place] to initiate washing R hand with L    Toileting  Tasks: adjust/manage clothing; perform bowel hygiene  [place]: touching/steadying assist  Safety/Cues: impulsivity  Adaptive Equipment: none  Comment: Continent of small BM and urine while seated on toilet. [place] A for balance during clothing management and Min A to manage clothing up on R side.    Self-Feeding     Cognition  Affect/Mental Status: anxious  Behavioral Issues: overwhelmed easily  Orientation Status: unable/difficult to assess; other (see comments) (unable to assess due to language deficits)  Follows Commands: follows one-step commands; 50-74% accuracy; delayed response/completion; increased processing time needed; initiation impaired; repetition of directions required; verbal cues/prompting required  Cognitive Function: attention deficit; executive function deficit; safety deficit  Attention Deficit: minimal deficit  Comment, Attention: Pt restless and mildly agitated in beginning of session, almost hitting 1:1. 1:1 reported that pt easily redirected through most of day but becomes agitated when being told to do something she does not want to do (ex. wearing hospital socks versus her own)  Pt calmed down with low demands and once rapport established, pt attended throughout with min-mod cues. Visual distractibility noted with pt frequently touching therapist earrings, refolding washcloth etc.  Executive Function Deficit: severe deficit; judgment; planning/decision-making; self-monitoring/self-correction; impulse control; organization/sequencing  Safety Deficit:  impulsivity  Comment, Cognition: pt expressing through gentures difficultiies with minimial movement of R wrist/hand, therapist provided support. Difficulties attending to AAROM exercises due to language impairments. Noted pt dancing in her chair to music playing in the gym.  Comment: unable to complete due to severity of language deficits    Communication  Speech Intelligibility (Motor Speech): word level  Word Level, Speech Intelligibility (Motor Speech): minimal impairment  Phrase/Sentence Level, Speech Intelligibility (Motor Speech): impaired; minimal impairment  Articulation (Motor Speech): imprecise articulation  Comment, Motor Speech Intervention: Using bathroom mirror for feedback, pt imitated different mouth positions/OMEs and imitated vowels x 5/5 and functional CV words (no, yeah, me, hi, bye) x 5/5 with overall mod cues.  Follows Commands (Auditory Comprehension): 1-step command  Yes/No Questions (Auditory Comprehension): biographical/personal questions  Biographical/Personal Questions (Auditory Comprehension): 75-90% accuracy  Comment, Assessment (Auditory Comprehension): Lynn Cesars: Aud comprehension 16/24 (simple y/n 8/12; simple commands 2/4, id pictures by description 6/6; id object function 0/2)      Frequency of Treatment (OT): 5-7 times per week, 60-90 minutes per day  Frequency of Treatment (PT): 5-7 times per week, 60-90 minutes per day  Frequency of Treatment (SLP): 5-7 times per week, 60 minutes per day       Weekly Outcome Summaries:  Weekly Progress Summary (OT)  Progress Toward Functional Goals (OT): progressing toward functional goals as expected  Weekly Outcome Statement: Plan to fabricate R resitng hand splint tomorrow.  Barriers to Overall Progress (OT): Pt is limited by communication/cognitive deficits, impulsivity/decreased safety awareness, balance deficits and R UE distal weakness/R wrist drop and limited social support.  Recommendations: Continue IP OT 5-7 days/week for  60-90 min/day.    Weekly Progress Summary (SLP)  Progress Toward Functional Goals (SLP): progressing toward functional goals as expected  Weekly Outcome Statement: Pt with aphasia (expressive > receptive) and severe apraxia.  Pt benefits from social cues and context for receptive language tasks.  Pt able to produce vowels and common CV, VC targets (Hi, bye, oh, etc) with max cues and mirror feedback. Pt with poor object recognition (brushing cheek with toothbrush) and task recognition (especially in meals such as bolus retrieval for biting versus sucking).  Pt on SB6 and MT2 but requires MAX encouragement to eat and max verbal cues for oral phase sequences (retrieval, chewing, oral clearance) even for food preferences (pizza).  Aggressively targeting thins with pt demo’ing delayed mild cough following sips of thins in less than 10% of aggressive trials. Continuing to target apraxia, dysphagia, and language.  Barriers to Overall Progress (SLP): severity of aphasia/apraxia, poor object and task recognition, resistance to hands on assistance, poor PO intake  Impairments Continuing to Limit Function: impaired cognition; impaired communication; impaired motor control; impaired swallowing; impaired safety awareness        Problem Resolution:  Team Plan to Resolve Problems: One to one for impulsivity.  Meds crushed.  Tylenol for back pain.  Responds well to a calm environment.  Team Weekly Outcome Statement: Medically stable.  Poor appetite.  One to one.  Needs encouragement to take meds.  Cont b/b.  PT says cog/comm and safety are very impaired.  MCGRATH 33.  OT says safety is very impaired.  R resting hand splint.  Frustrated with communication.  ST says expressive deficits are greater than receptive.  Hoping to advance to thin liquids.  Psych says reduced verbal output.  Inc yes/no.  Denies distress or pain.         Expected Level of Function  Self-Care: Supervision or touching assistance  Sphincter Control:  Independent  Transfers: Supervision or touching assistance  Locomotion: Supervision or touching assistance  Communication: Partial/moderate assistance  Social Cognition: Partial/moderate assistance      Goals/Support System:  Patient/Family Goals  Patient's Goals For Discharge: other (see comments) (pt unable to state goals due to language deficits)  Family/Support System  Caregiver Engagement: supports patient in making care decisions, takes an active role with patient  Family/Support System Care: support provided, self-care encouraged    IRF PT Goals    Flowsheet Row Most Recent Value   Bed Mobility Goal 1    Activity/Assistive Device bed mobility activities, all at 07/02/2023 0830   [place] supervision required at 07/02/2023 0830   Time Frame 1 week, short-term goal (STG) at 07/05/2023 0835   Strategies/Barriers not reassessed at 07/05/2023 0835   Progress/Outcome goal ongoing at 07/05/2023 0835   Bed Mobility Goal 2    Activity/Assistive Device bed mobility activities, all at 07/02/2023 0830   [place] modified independence at 07/02/2023 0830   Time Frame 2 weeks, long-term goal (LTG) at 07/05/2023 0835   Progress/Outcome goal ongoing at 07/05/2023 0835   Transfer Goal 1    Activity/Assistive Device sit-to-stand/stand-to-sit, bed-to-chair/chair-to-bed, stand pivot at 07/02/2023 0830   [place] supervision required at 07/02/2023 0830   Time Frame 1 week, short-term goal (STG) at 07/05/2023 0835   Strategies/Barriers steadying assist for safety at 07/05/2023 0835   Progress/Outcome goal ongoing at 07/05/2023 0835   Transfer Goal 2    Activity/Assistive Device sit-to-stand/stand-to-sit, bed-to-chair/chair-to-bed, stand pivot at 07/02/2023 0830   [place] modified independence at 07/02/2023 0830   Time Frame 2 weeks, long-term goal (LTG) at 07/05/2023 0835   Progress/Outcome goal ongoing at 07/05/2023 0835   Gait/Walking Locomotion Goal 1    Activity/Assistive Device gait (walking locomotion) at  07/02/2023 0830   Distance 250 feet at 07/02/2023 0830   [place] supervision required at 07/02/2023 0830   Time Frame 1 week, short-term goal (STG) at 07/05/2023 0835   Strategies/Barriers touching assist at 07/05/2023 0835   Progress/Outcome goal ongoing at 07/05/2023 0835   Gait/Walking Locomotion Goal 2    Activity/Assistive Device gait (walking locomotion) at 07/02/2023 0830   Distance 500 feet at 07/02/2023 0830   [place] supervision required at 07/02/2023 0830   Time Frame 2 weeks, long-term goal (LTG) at 07/05/2023 0835   Progress/Outcome goal ongoing at 07/05/2023 0835   Wheelchair Locomotion Goal 1    Activity wheelchair mobility skills, all at 07/02/2023 0830   Assistive Device manual, lightweight at 07/02/2023 0830   Distance 50 feet at 07/02/2023 0830   [place] minimum assist (75% or more patient effort) at 07/02/2023 0830   Time Frame 1 week, short-term goal (STG) at 07/05/2023 0835   Strategies/Barriers lower w/c ordered at 07/05/2023 0835   Progress/Outcome goal no longer appropriate at 07/05/2023 0835   Wheelchair Locomotion Goal 2    Activity wheelchair mobility skills, all at 07/02/2023 0830   Assistive Device manual, lightweight at 07/02/2023 0830   Distance 200 feet at 07/05/2023 0835   [place] modified independence at 07/05/2023 0835   Time Frame 2 weeks, long-term goal (LTG) at 07/05/2023 0835   Progress/Outcome goal revised this date at 07/05/2023 0835   Stairs Goal 1    Activity/Assistive Device stairs, all skills at 07/02/2023 0830   Number of Stairs 16 at 07/02/2023 0830   [place] supervision required at 07/02/2023 0830   Time Frame 1 week, short-term goal (STG) at 07/05/2023 0835   Strategies/Barriers touching assist at 07/05/2023 0835   Progress/Outcome goal ongoing at 07/05/2023 0835   Stairs Goal 2    Activity/Assistive Device stairs, all skills at 07/02/2023 0830   Number of Stairs 24 at 07/02/2023 0830   [place] supervision required at 07/02/2023 0830    Time Frame 2 weeks, long-term goal (LTG) at 07/05/2023 0835   Progress/Outcome goal ongoing at 07/05/2023 0835        IRF OT Goals    Flowsheet Row Most Recent Value   Transfer Goal 1    Activity/Assistive Device toilet at 07/02/2023 0711   [place] --  [Cl S] at 07/02/2023 0711   Time Frame short-term goal (STG), 5 - 7 days at 07/05/2023 0702   Progress/Outcome goal ongoing, goal revised this date at 07/05/2023 0702   Transfer Goal 2    Activity/Assistive Device toilet at 07/02/2023 0711   [place] supervision required at 07/02/2023 0711   Time Frame long-term goal (LTG), 14 days or less at 07/02/2023 0711   Progress/Outcome goal ongoing at 07/05/2023 0702   Transfer Goal 3    Activity/Assistive Device shower at 07/02/2023 0711   [place] --  [Cl S] at 07/02/2023 0711   Time Frame short-term goal (STG), 5 - 7 days at 07/02/2023 0711   Progress/Outcome goal ongoing, goal revised this date at 07/05/2023 0702   Transfer Goal 4    Activity/Assistive Device shower at 07/02/2023 0711   [place] supervision required at 07/02/2023 0711   Time Frame long-term goal (LTG), 14 days or less at 07/02/2023 0711   Progress/Outcome goal ongoing at 07/05/2023 0702   Bathing Goal 1    [place] --  [Steadying A] at 07/02/2023 0711   Time Frame short-term goal (STG), 5 - 7 days at 07/05/2023 0702   Progress/Outcome goal ongoing, goal revised this date at 07/05/2023 0702   Bathing Goal 2    [place] supervision required at 07/02/2023 0711   Time Frame long-term goal (LTG), 14 days or less at 07/02/2023 0711   Progress/Outcome goal ongoing at 07/05/2023 0702   UB Dressing Goal 1    [place] --  [Cl S] at 07/05/2023 0702   Time Frame short-term goal (STG), 5 - 7 days at 07/05/2023 0702   UB Dressing Goal 2    [place] set-up required at 07/02/2023 0711   Time Frame long-term goal (LTG), 14 days or less at 07/02/2023 0711   Progress/Outcome goal ongoing at 07/05/2023 0702   LB Dressing Goal 1     [place] --  [Cl S] at 07/05/2023 0702   Time Frame short-term goal (STG), 5 - 7 days at 07/05/2023 0702   LB Dressing Goal 2    [place] supervision required at 07/02/2023 0711   Time Frame long-term goal (LTG), 14 days or less at 07/02/2023 0711   Progress/Outcome goal ongoing at 07/05/2023 0702   Grooming Goal 1    [place] --  [Steadying A] at 07/05/2023 0702   Time Frame short-term goal (STG), 5 - 7 days at 07/05/2023 0702   Progress/Outcome goal revised this date at 07/05/2023 0702   Grooming Goal 2    [place] set-up required at 07/02/2023 0711   Time Frame long-term goal (LTG), 14 days or less at 07/02/2023 0711   Progress/Outcome goal ongoing at 07/05/2023 0702   Toileting Goal 1    [place] --  [Cl S] at 07/05/2023 0702   Time Frame short-term goal (STG), 5 - 7 days at 07/05/2023 0702   Progress/Outcome goal met, goal revised this date at 07/05/2023 0702   Toileting Goal 2    [place] supervision required at 07/02/2023 0711   Time Frame long-term goal (LTG), 14 days or less at 07/02/2023 0711   Progress/Outcome goal ongoing at 07/05/2023 0702        IRF SLP Goals    Flowsheet Row Most Recent Value   Auditory Comprehension Goal 1    Auditory Comprehension Goal 1 pt will complete basic Aud comprehension tasks (simple yes/no 75%),  1 step commands 50% with max cues,  match word heard to picture f/o 2 50% max cues at 07/02/2023 1001   Time Frame short-term goal (STG), 1 week at 07/02/2023 1001   Progress/Outcome goal ongoing at 07/04/2023 1510   Auditory Comprehension Goal 2    Auditory Comprehension Goal 2 Pt will understand mod-complex level y/n questions 90% min cues,  2 step commands 75% min-mod cues,  at 07/02/2023 1001   Time Frame long-term goal (LTG), 4 weeks at 07/02/2023 1001   Progress/Outcome goal ongoing at 07/04/2023 1510   Verbal Expression Goal 1    Verbal Expression Goal 1 Pt will complete basic VE tasks (name. automatics, simple opposites, naming) with 20% accuracy  and max cues. at 07/02/2023 1001   Time Frame short-term goal (STG), 1 week at 07/02/2023 1001   Progress/Outcome goal ongoing at 07/04/2023 1510   Verbal Expression Goal 2    Verbal Expression Goal 2 Pt will complete Verbal expression tasks (basic-mod) with 50-75% accuracy and min-mod cues.  Pt will imitate vowels 50% max cues,  at 07/02/2023 1001   Time Frame long-term goal (LTG), 4 weeks at 07/02/2023 1001   Progress/Outcome goal ongoing at 07/04/2023 1510   Motor Speech/Voice Goal 1    Motor Speech/Voice Goal 1 Repetition of vowels, CV/VC and CVC targets with max cues and models for 60% acc. at 07/04/2023 1510   Time Frame short-term goal (STG), 2 weeks at 07/04/2023 1510   Progress/Outcome goal ongoing at 07/04/2023 1510   Motor Speech/Voice Goal 2    Motor Speech/Voice Goal 2 Repetition of vowels, CV/VC and CVC targets with min cues and models for 90% acc. at 07/04/2023 1510   Time Frame long-term goal (LTG), 4 weeks at 07/04/2023 1510   Progress/Outcome goal ongoing at 07/04/2023 1510   Reading Comprehension Goal 1    Reading Comprehension Goal 1 Pt will match word to picture field of 2 with 50% accuracy and mod-max cues,  Pt will read single functional words with 20% accuracy max cues at 07/02/2023 1001   Time Frame short-term goal (STG), 1 week at 07/02/2023 1001   Progress/Outcome goal no longer appropriate at 07/04/2023 1510   Reading Comprehension Goal 2    Reading Comprehension Goal 2 Pt will read simple sentences and comprehend simple sentences with 75% accuracy min-mod cues at 07/02/2023 1001   Time Frame long-term goal (LTG) at 07/02/2023 1001   Progress/Outcome goal no longer appropriate at 07/04/2023 1510   Written Expression Goal 1    Written Expression Goal 1 Pt will copy first name, shapes, letters and 3-4 letter words with 80% min cues,  pt will copy letters, name shapes with 50% accuracy and mod cues. at 07/02/2023 1001   Time Frame short-term goal (STG), 1 week at 07/02/2023 1003    Progress/Outcome goal no longer appropriate at 07/04/2023 1510   Written Expression Goal 2    Written Expression Goal 2 Pt will write first and last name,  single words 90% accuracy at 07/02/2023 1001   Progress/Outcome goal no longer appropriate at 07/04/2023 1510   Oral Nutrition/Hydration Goal 1    Activity effective/safe, use of swallowing techniques, management of texture/viscosity, other (see comments)  [SB6,  MT2.  Trials of thins and various textures by speech with min s/s of aspiration] at 07/02/2023 1001   Time Frame short-term goal (STG), 14 days or less at 07/02/2023 1001   Progress/Outcome goal ongoing at 07/04/2023 1510   Oral Nutrition/Hydration Goal 2    Activity effective/safe, oral nutrition/hydration, use of swallowing techniques, management of texture/viscosity, other (see comments)  [regular and thins] at 07/02/2023 1001   Time Frame long-term goal (LTG), 4 weeks at 07/02/2023 1001   Progress/Outcome goal ongoing at 07/04/2023 1510   Oral Motor Exercise Goal 1    Activity perform oral motor strengthening exercises, facial/cheek, lip, tongue, strength/ROM, mastication/bolus manipulation, 5-10 times per session, other (see comments)  [use mirror/visual model] at 07/02/2023 1001   [place] with 1:1 supervision/constant cues at 07/02/2023 1001   Time Frame short-term goal (STG), 14 days or less at 07/02/2023 1001   Progress/Outcome goal ongoing at 07/04/2023 1510   Oral Motor Exercise Goal 2    Activity perform oral motor strengthening exercises, facial/cheek, lip, tongue, strength/ROM, mastication/bolus manipulation, other (see comments)  [2-3 times a day sets of 10-15] at 07/02/2023 1001   [place] with minimal cues (75-90% accuracy) at 07/02/2023 1001   Time Frame long-term goal (LTG), 4 weeks at 07/02/2023 1001   Progress/Outcome goal ongoing at 07/04/2023 1510          Discharge Planning:  Anticipated Discharge Disposition: home with home health  Type of Home Care Services: home  SLP;home OT;nursing;home PT  Equipment/Device Needs at Discharge  Assistive Device/Animal Currently Used at Home:  (Per chart review pt has RW at home - unclear if pt was using AD and pt unable to state PLOF at this time)  Discharge Planning  Does the patient need discharge transport arranged?: No    Anticipated Discharge Date: 7/14/2023    Needs Identified:       Team Members Present:     Rehab Attending Present:  Liv Haywood MD    Care Coordinator Present:  Naila Verma LSW    Nurse Present:  Erica Belcher RN    OT Present:  Daniela Corrales OT    PT Present:  Melba Hill PT    SLP Present:  Isabel Orantes CF-SLP    Psychologist Present:  Madyson Chanel PSY.D        Next Team Conference Date: 07/11/23

## 2023-07-05 NOTE — PROGRESS NOTES
Patient: Jennifer Sams  Location: [facility] [address]  MRN: [id number]  Today's date: 7/5/2023    History of Present Illness  Jnenifer is a 78 y.o. female admitted on 7/1/2023 with Acute ischemic left MCA stroke (CMS/HCC) [I63.512]. Principal problem is Acute ischemic left MCA stroke (CMS/HCC).    The patient is a 78-year-old  female with a history of AAA, heart block s/p cardiac pacemaker, glaucoma, hyperlipidemia, coronary artery disease, NSTEMI, who presented to [facility] on June 26 after she has not been seen by her friends for a few days.  Patient lives alone and when she was found by EMS she was confused and combative.  In the emergency department she was aphasic with right-sided weakness and hypertensive to the 190 systolic.  CT scan of the head revealed an acute infarct in left MCA with mild bleeding.  Echo showed an EF of 43% and a bubble study was negative.  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  Later this was transitioned to apixaban.  She was continued on statin for secondary prevention.  She was evaluated by speech therapy and cleared for a soft and bite-size diet with moderately thick liquids.    7/2/2023:  Diet SB6 with mildly thick liquids (MT2)    Past Medical History  Jennifer has a past medical history of AAA (abdominal aortic aneurysm), Arthritis, Elevated blood pressure reading without diagnosis of hypertension (4/19/2019), Epistaxis (1/6/2020), GERD (gastroesophageal reflux disease) (4/19/2019), Glaucoma (4/19/2019), Heart murmur, Hiatal hernia, History of hip replacement, total, right (4/19/2019), History of rheumatic fever as a child (4/19/2019), Hypercholesterolemia (4/19/2019), Ischemic cardiomyopathy (5/9/2019), Kidney cysts, Osteoarthritis of multiple joints (4/19/2019), Osteoporosis, Pacemaker (12/9/2019), Raynaud's disease (4/19/2019), 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        PT Vitals    Date/Time Pulse BP BP Location BP Method Pt Position [facility]   07/05/23 1404 62 155/70 Left upper arm Automatic Sitting [facility]   07/05/23 1443 90 133/65 Left upper arm Automatic Sitting LBR      PT Pain    Date/Time Pain Type Rating: Rest Rating: Activity [facility]   07/05/23 1404 Pain Assessment 0 - no pain 0 - no pain LBR   07/05/23 1443 Post Activity 0 - no pain 0 - no pain LBR          Prior Living Environment    Flowsheet Row Most Recent Value   People in Home alone   Current Living Arrangements home   Home Accessibility --  [2 SH with 1 DARÍO]   Living Environment Comment Per chart review, pt lives alone in a 2SH with 1STE   Number of Stairs, Main Entrance 1   Number of Stairs, Within Home, Primary 12  [Full fight to second floor]          Prior Level of Function    Flowsheet Row Most Recent Value   Dominant Hand --  [Unable to determine 2/2 cognitive/communication deficits]   Ambulation independent   Transferring independent   Toileting independent   Bathing independent   Dressing independent   Eating independent   IADLs independent   Communication understands/communicates without difficulty   Swallowing swallows foods/liquids without difficulty   Baseline Diet/Method of Nutritional Intake no diet restrictions   Past History of Dysphagia no   Prior Level of Function Comment Per chart review: PMH SB6,  MT2,  No VFSS in EMR.  Pt seen by SLP at [place].   Assistive Device Currently Used at Home --  [Per chart review pt has RW at home - unclear if pt was using AD and pt unable to state PLOF at this time]           IRF PT Evaluation and Treatment - 07/05/23 1406        PT Time Calculation    Start Time 1400     Stop Time 1500     Time Calculation (min) 60 min        Session Details    Document Type Daily Treatment/Progress Note     Mode of Treatment physical  therapy;individual therapy        Mobility Belt    Mobility Belt Used for All Out of Bed Activity yes        Sit to Stand Transfer    [place], Sit to Stand Transfer close supervision     Safety/Cues impulsivity     Assistive Device none     Comment from w/c        Stand to Sit Transfer    [place], Stand to Sit Transfer close supervision     Safety/Cues impulsivity     Assistive Device none     Comment to w/c        Stand Pivot Transfer    [place], Stand Pivot/Stand Step Transfer close supervision     Safety/Cues impulsivity     Assistive Device none     Comment amb approach        Car Transfer    Transfer Technique stand pivot     [place] close supervision     Safety/Cues technique     Assistive Device none     Comment amb approach        Gait Training    [place], Gait touching/steadying assist     Safety/Cues impulsivity     Assistive Device none     Distance in Feet 300 feet     Pattern step-through     Deviations/Abnormal Patterns base of support, narrow     Bilateral Gait Deviations heel strike decreased     Comment (Gait/Stairs) Pt mostly cl S - but requires intermittent steadying assist for navigation and safety.  Pt tolerated >500'        Stairs Training    [place], Stairs touching/steadying assist     Safety/Cues impulsivity     Assistive Device railing     Handrail Location (Stairs) left side (ascending);left side (descending)     Number of Stairs 16     Stair Height 7 inches     Ascending Stairs Technique step-over-step     Descending Stairs Technique step-over-step     Comment steadying assist for balance.        Wheelchair Mobility/Management    Method of Locomotion bipedal (lower extremity) propulsion     Wheelchair Type manual, lightweight     [place], Forward Propulsion close supervision     [place], Steering Activities close supervision     [place], Turning Activities close supervision     Safety/Cues technique     Distance Propelled in Feet 50 feet      Comment, Wheelchair Mobility Pt repositioned into a hemiheight w/c and propulsion initiated as above with max cueing for safety.        Balance    Static Standing Balance mild impairment;unsupported     Dynamic Standing Balance mild impairment;unsupported     Comment, Balance Pt stood on blue foam with reaching and horseshoe toss: steadying assist for balance.  Picking horseshoes off the floor with steadying assist for balance.        Daily Progress Summary (PT)    Daily Outcome Statement Pt tolerated >25 mins continuous standing and ambulation without issue.  Pt engaged and interactive navigating the gift shop and looking at artwork. Improving receptive language noted.  All restraints in place as ordered at end of session.  Pt directly handed off to 1:1 at end of session.                           IRF PT Goals    Flowsheet Row Most Recent Value   Bed Mobility Goal 1    Activity/Assistive Device bed mobility activities, all at 07/02/2023 0830   [place] supervision required at 07/02/2023 0830   Time Frame 1 week, short-term goal (STG) at 07/05/2023 0835   Strategies/Barriers not reassessed at 07/05/2023 0835   Progress/Outcome goal ongoing at 07/05/2023 0835   Bed Mobility Goal 2    Activity/Assistive Device bed mobility activities, all at 07/02/2023 0830   [place] modified independence at 07/02/2023 0830   Time Frame 2 weeks, long-term goal (LTG) at 07/05/2023 0835   Progress/Outcome goal ongoing at 07/05/2023 0835   Transfer Goal 1    Activity/Assistive Device sit-to-stand/stand-to-sit, bed-to-chair/chair-to-bed, stand pivot at 07/02/2023 0830   [place] supervision required at 07/02/2023 0830   Time Frame 1 week, short-term goal (STG) at 07/05/2023 0835   Strategies/Barriers steadying assist for safety at 07/05/2023 0835   Progress/Outcome goal ongoing at 07/05/2023 0835   Transfer Goal 2    Activity/Assistive Device sit-to-stand/stand-to-sit, bed-to-chair/chair-to-bed, stand pivot at 07/02/2023 0830    [place] modified independence at 07/02/2023 0830   Time Frame 2 weeks, long-term goal (LTG) at 07/05/2023 0835   Progress/Outcome goal ongoing at 07/05/2023 0835   Gait/Walking Locomotion Goal 1    Activity/Assistive Device gait (walking locomotion) at 07/02/2023 0830   Distance 250 feet at 07/02/2023 0830   [place] supervision required at 07/02/2023 0830   Time Frame 1 week, short-term goal (STG) at 07/05/2023 0835   Strategies/Barriers touching assist at 07/05/2023 0835   Progress/Outcome goal ongoing at 07/05/2023 0835   Gait/Walking Locomotion Goal 2    Activity/Assistive Device gait (walking locomotion) at 07/02/2023 0830   Distance 500 feet at 07/02/2023 0830   [place] supervision required at 07/02/2023 0830   Time Frame 2 weeks, long-term goal (LTG) at 07/05/2023 0835   Progress/Outcome goal ongoing at 07/05/2023 0835   Wheelchair Locomotion Goal 1    Activity wheelchair mobility skills, all at 07/02/2023 0830   Assistive Device manual, lightweight at 07/02/2023 0830   Distance 50 feet at 07/02/2023 0830   [place] minimum assist (75% or more patient effort) at 07/02/2023 0830   Time Frame 1 week, short-term goal (STG) at 07/05/2023 0835   Strategies/Barriers lower w/c ordered at 07/05/2023 0835   Progress/Outcome goal no longer appropriate at 07/05/2023 0835   Wheelchair Locomotion Goal 2    Activity wheelchair mobility skills, all at 07/02/2023 0830   Assistive Device manual, lightweight at 07/02/2023 0830   Distance 200 feet at 07/05/2023 0835   [place] modified independence at 07/05/2023 0835   Time Frame 2 weeks, long-term goal (LTG) at 07/05/2023 0835   Progress/Outcome goal revised this date at 07/05/2023 0835   Stairs Goal 1    Activity/Assistive Device stairs, all skills at 07/02/2023 0830   Number of Stairs 16 at 07/02/2023 0830   [place] supervision required at 07/02/2023 0830   Time Frame 1 week, short-term goal (STG) at 07/05/2023 0835   Strategies/Barriers touching  assist at 07/05/2023 0835   Progress/Outcome goal ongoing at 07/05/2023 0835   Stairs Goal 2    Activity/Assistive Device stairs, all skills at 07/02/2023 0830   Number of Stairs 24 at 07/02/2023 0830   [place] supervision required at 07/02/2023 0830   Time Frame 2 weeks, long-term goal (LTG) at 07/05/2023 0835   Progress/Outcome goal ongoing at 07/05/2023 0835

## 2023-07-05 NOTE — PLAN OF CARE
CM update pt, POA and friend following the team meeting.  Discussed with POA/friend that she will need 24 hour supervision upon d/c.  She is aware of [facility] 7/14.  They are currently considering AL or having a friend care for her at home.  Her POA, remigio, is scheduled for FTR on 7/13 so that she can plan and be aware of her care needs.  Following for d/c planning.

## 2023-07-05 NOTE — PROGRESS NOTES
Patient: Jennifer Sams  Location: [facility] [address]  MRN: [id number]  Today's date: 7/5/2023    History of Present Illness  Jennifer is a 78 y.o. female admitted on 7/1/2023 with Acute ischemic left MCA stroke (CMS/HCC) [I63.512]. Principal problem is Acute ischemic left MCA stroke (CMS/HCC).    The patient is a 78-year-old  female with a history of AAA, heart block s/p cardiac pacemaker, glaucoma, hyperlipidemia, coronary artery disease, NSTEMI, who presented to [facility] on June 26 after she has not been seen by her friends for a few days.  Patient lives alone and when she was found by EMS she was confused and combative.  In the emergency department she was aphasic with right-sided weakness and hypertensive to the 190 systolic.  CT scan of the head revealed an acute infarct in left MCA with mild bleeding.  Echo showed an EF of 43% and a bubble study was negative.  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  Later this was transitioned to apixaban.  She was continued on statin for secondary prevention.  She was evaluated by speech therapy and cleared for a soft and bite-size diet with moderately thick liquids.    7/2/2023:  Diet SB6 with mildly thick liquids (MT2)    Past Medical History  Jennifer has a past medical history of AAA (abdominal aortic aneurysm), Arthritis, Elevated blood pressure reading without diagnosis of hypertension (4/19/2019), Epistaxis (1/6/2020), GERD (gastroesophageal reflux disease) (4/19/2019), Glaucoma (4/19/2019), Heart murmur, Hiatal hernia, History of hip replacement, total, right (4/19/2019), History of rheumatic fever as a child (4/19/2019), Hypercholesterolemia (4/19/2019), Ischemic cardiomyopathy (5/9/2019), Kidney cysts, Osteoarthritis of multiple joints (4/19/2019), Osteoporosis, Pacemaker (12/9/2019), Raynaud's disease (4/19/2019), 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        OT Vitals    Date/Time Pulse HR Source BP BP Location BP Method Pt Position [facility]   07/05/23 0702 60 Left Radial 155/85 Left upper arm Manual Sitting [place]      OT Pain    Date/Time Pain Type Rating: Rest Rating: Activity [facility]   07/05/23 0702 Pain Assessment 0 - no pain 0 - no pain [place]   07/05/23 0758 Pain Reassessment 0 - no pain 0 - no pain AK          Prior Living Environment    Flowsheet Row Most Recent Value   People in Home alone   Current Living Arrangements home   Home Accessibility --  [2 SH with 1 DARÍO]   Living Environment Comment Per chart review, pt lives alone in a 2SH with 1STE   Number of Stairs, Main Entrance 1   Number of Stairs, Within Home, Primary 12  [Full fight to second floor]          Prior Level of Function    Flowsheet Row Most Recent Value   Dominant Hand --  [Unable to determine 2/2 cognitive/communication deficits]   Ambulation independent   Transferring independent   Toileting independent   Bathing independent   Dressing independent   Eating independent   IADLs independent   Communication understands/communicates without difficulty   Swallowing swallows foods/liquids without difficulty   Baseline Diet/Method of Nutritional Intake no diet restrictions   Past History of Dysphagia no   Prior Level of Function Comment Per chart review: PMH SB6,  MT2,  No VFSS in EMR.  Pt seen by SLP at [place].   Assistive Device Currently Used at Home --  [Per chart review pt has RW at home - unclear if pt was using AD and pt unable to state PLOF at this time]          Occupational Profile    Flowsheet Row Most Recent Value   Occupational History/Life Experiences Will need to clarify PLOF/driving status. Pt unable to state 2/2 cognitive/communication deficits.   Environmental Supports and Barriers Per chart review, pt lives alone but has supportive friends.    Patient Goals Pt unable to state goal 2/2 communication deficits.           IRF OT Evaluation and Treatment - 07/05/23 0702        OT Time Calculation    Start Time 0700     Stop Time 0800     Time Calculation (min) 60 min        Session Details    Document Type Daily Treatment/Progress Note     Mode of Treatment occupational therapy;individual therapy        General Information    General Observations of Patient OT Weekly Update - Direct handoff pre/post session to 1:1.        Mobility Belt    Mobility Belt Used for All Out of Bed Activity yes        Cognition/Psychosocial    Comment, Cognition Pt demo increased frustration via gestures and facial expressions c difficulty communicating 2/2 communication impairments.        Bed Mobility    Comment OT: Supine to sit EOB c Cl S.        Sit to Stand Transfer    [place], Sit to Stand Transfer touching/steadying assist     Safety/Cues impulsivity     Assistive Device none     Comment From EOB and DME surfaces.        Stand to Sit Transfer    [place], Stand to Sit Transfer touching/steadying assist     Safety/Cues impulsivity     Assistive Device none     Comment To EOB and DME surfaces.        Stand Pivot Transfer    [place], Stand Pivot/Stand Step Transfer touching/steadying assist     Safety/Cues impulsivity     Assistive Device none     Comment Via amb approach.        Toilet Transfer    Transfer Technique stand pivot     [place] touching/steadying assist     Safety/Cues impulsivity     Assistive Device grab bars/safety frame     Comment Via amb approach.        Shower Transfer    Transfer Technique stand pivot     [place] touching/steadying assist     Safety/Cues impulsivity;technique     Assistive Device shower chair;grab bars/tub rail     Comment Via amb approach onto shower chair in barrier free shower stall.        Impairments/Safety Issues    Comment, Safety Issues/Impairments OT: Short HHM completed within pt room to/from bathroom c  Steadying A s AD.        Bathing    Shower Provided? Yes     [place] minimum assist (75% or more patient effort)     Safety/Cues impulsivity;attention;malaika/one-handed technique;sequencing;compensatory techniques     Position unsupported sitting;unsupported standing     Setup Assistance adaptive equipment setup;adjust water temperature/flow;obtain supplies;open containers     Adaptive Equipment shower chair;grab bar/tub rail;hand-held shower spray hose     Comment Noted pt required less cues to remain seated on shower chair in comparison to last shower, however still requires cues for safety and to sit on shower chair t/o 2/2 impulsivity and decreased safety awareness. Assist to bathe/dry LUE. Steadying A for balance while in stance.        Upper Body Dressing    Tasks don;pull over garment     [place] touching/steadying assist     Safety/Cues impulsivity     Position edge of bed sitting     Adaptive Equipment none     Comment Pt completed amb level clothing retrieval prior to dressing c Steadying A s AD. Then sat EOB to don shirt c Touching A to adjust on R side.        Lower Body Dressing    Tasks don;pants/bottoms;underwear;socks     [place] touching/steadying assist     Safety/Cues impulsivity     Position edge of bed sitting;unsupported standing     Adaptive Equipment none     Comment Pt completed amb level clothing retrieval prior to dressing c Steadying A s AD. Pt able to thread B LE into underwear/pants while seated EOB, then stands to manage over hips c Steadying A to adjust minimally on R side.        Grooming    Tasks oral care (brushing teeth, cleaning dentures)     [place] minimum assist (75% or more patient effort)     Safety/Cues impulsivity;sequencing;attention     Position supported standing;unsupported standing     Setup Assistance obtain supplies;open containers     Adaptive Equipment none     [place], Oral Hygiene minimum assist (75% or more patient effort)     Comment Pt  stood at sink to complete grooming tasks c Steadying A/Cl S. Max multimodal cues to motor plan/sequence task of brushing teeth. Attempting to brush teeth with finger and inproperly using grooming tools 2/2 apraxia. OT also providing Min A to incorporate R UE into task as stabilizer and place on counter for feedback during grooming.        Toileting    Tasks adjust/manage clothing;perform bladder hygiene     [place] touching/steadying assist     Safety/Cues impulsivity     Position unsupported sitting;unsupported standing     Adaptive Equipment none     Comment Continent of bladder while seated on toilet. Steadying A while in stance for clothing management.        Weekly Progress Summary (OT)    Progress Toward Functional Goals (OT) progressing toward functional goals as expected     Weekly Outcome Statement Plan to fabricate R resitng hand splint tomorrow.     Barriers to Overall Progress (OT) Pt is limited by communication/cognitive deficits, impulsivity/decreased safety awareness, balance deficits and R UE distal weakness/R wrist drop and limited social support.     Recommendations Continue IP OT 5-7 days/week for 60-90 min/day.                           IRF OT Goals    Flowsheet Row Most Recent Value   Transfer Goal 1    Activity/Assistive Device toilet at 07/02/2023 0711   [place] --  [Cl S] at 07/02/2023 0711   Time Frame short-term goal (STG), 5 - 7 days at 07/05/2023 0702   Progress/Outcome goal ongoing, goal revised this date at 07/05/2023 0702   Transfer Goal 2    Activity/Assistive Device toilet at 07/02/2023 0711   [place] supervision required at 07/02/2023 0711   Time Frame long-term goal (LTG), 14 days or less at 07/02/2023 0711   Progress/Outcome goal ongoing at 07/05/2023 0702   Transfer Goal 3    Activity/Assistive Device shower at 07/02/2023 0711   [place] --  [Cl S] at 07/02/2023 0711   Time Frame short-term goal (STG), 5 - 7 days at 07/02/2023 0711   Progress/Outcome goal  ongoing, goal revised this date at 07/05/2023 0702   Transfer Goal 4    Activity/Assistive Device shower at 07/02/2023 0711   [place] supervision required at 07/02/2023 0711   Time Frame long-term goal (LTG), 14 days or less at 07/02/2023 0711   Progress/Outcome goal ongoing at 07/05/2023 0702   Bathing Goal 1    [place] --  [Steadying A] at 07/02/2023 0711   Time Frame short-term goal (STG), 5 - 7 days at 07/05/2023 0702   Progress/Outcome goal ongoing, goal revised this date at 07/05/2023 0702   Bathing Goal 2    [place] supervision required at 07/02/2023 0711   Time Frame long-term goal (LTG), 14 days or less at 07/02/2023 0711   Progress/Outcome goal ongoing at 07/05/2023 0702   UB Dressing Goal 1    [place] --  [Cl S] at 07/05/2023 0702   Time Frame short-term goal (STG), 5 - 7 days at 07/05/2023 0702   UB Dressing Goal 2    [place] set-up required at 07/02/2023 0711   Time Frame long-term goal (LTG), 14 days or less at 07/02/2023 0711   Progress/Outcome goal ongoing at 07/05/2023 0702   LB Dressing Goal 1    [place] --  [Cl S] at 07/05/2023 0702   Time Frame short-term goal (STG), 5 - 7 days at 07/05/2023 0702   LB Dressing Goal 2    [place] supervision required at 07/02/2023 0711   Time Frame long-term goal (LTG), 14 days or less at 07/02/2023 0711   Progress/Outcome goal ongoing at 07/05/2023 0702   Grooming Goal 1    [place] --  [Steadying A] at 07/05/2023 0702   Time Frame short-term goal (STG), 5 - 7 days at 07/05/2023 0702   Progress/Outcome goal revised this date at 07/05/2023 0702   Grooming Goal 2    [place] set-up required at 07/02/2023 0711   Time Frame long-term goal (LTG), 14 days or less at 07/02/2023 0711   Progress/Outcome goal ongoing at 07/05/2023 0702   Toileting Goal 1    [place] --  [Cl S] at 07/05/2023 0702   Time Frame short-term goal (STG), 5 - 7 days at 07/05/2023 0702   Progress/Outcome goal met, goal revised this date at 07/05/2023  0702   Toileting Goal 2    [place] supervision required at 07/02/2023 0711   Time Frame long-term goal (LTG), 14 days or less at 07/02/2023 0711   Progress/Outcome goal ongoing at 07/05/2023 0702

## 2023-07-05 NOTE — PROGRESS NOTES
Patient: Jennifer Sams  Location: [facility] [address]  MRN: [id number]  Today's date: 7/5/2023    History of Present Illness  Jennifer is a 78 y.o. female admitted on 7/1/2023 with Acute ischemic left MCA stroke (CMS/HCC) [I63.512]. Principal problem is Acute ischemic left MCA stroke (CMS/HCC).    The patient is a 78-year-old  female with a history of AAA, heart block s/p cardiac pacemaker, glaucoma, hyperlipidemia, coronary artery disease, NSTEMI, who presented to [facility] on June 26 after she has not been seen by her friends for a few days.  Patient lives alone and when she was found by EMS she was confused and combative.  In the emergency department she was aphasic with right-sided weakness and hypertensive to the 190 systolic.  CT scan of the head revealed an acute infarct in left MCA with mild bleeding.  Echo showed an EF of 43% and a bubble study was negative.  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  Later this was transitioned to apixaban.  She was continued on statin for secondary prevention.  She was evaluated by speech therapy and cleared for a soft and bite-size diet with moderately thick liquids.    7/2/2023:  Diet SB6 with mildly thick liquids (MT2)    Past Medical History  Jennifer has a past medical history of AAA (abdominal aortic aneurysm), Arthritis, Elevated blood pressure reading without diagnosis of hypertension (4/19/2019), Epistaxis (1/6/2020), GERD (gastroesophageal reflux disease) (4/19/2019), Glaucoma (4/19/2019), Heart murmur, Hiatal hernia, History of hip replacement, total, right (4/19/2019), History of rheumatic fever as a child (4/19/2019), Hypercholesterolemia (4/19/2019), Ischemic cardiomyopathy (5/9/2019), Kidney cysts, Osteoarthritis of multiple joints (4/19/2019), Osteoporosis, Pacemaker (12/9/2019), Raynaud's disease (4/19/2019), 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        PT Vitals    Date/Time Pulse BP BP Location BP Method Pt Position [facility]   07/05/23 1036 65 148/73 Left upper arm Automatic Sitting [facility]      PT Pain    Date/Time Pain Type Rating: Rest Rating: Activity [facility]   07/05/23 1036 Pain Assessment 0 - no pain 0 - no pain [facility]   07/05/23 1055 Post Activity 0 - no pain 0 - no pain LBR          Prior Living Environment    Flowsheet Row Most Recent Value   People in Home alone   Current Living Arrangements home   Home Accessibility --  [2 SH with 1 DARÍO]   Living Environment Comment Per chart review, pt lives alone in a 2SH with 1STE   Number of Stairs, Main Entrance 1   Number of Stairs, Within Home, Primary 12  [Full fight to second floor]          Prior Level of Function    Flowsheet Row Most Recent Value   Dominant Hand --  [Unable to determine 2/2 cognitive/communication deficits]   Ambulation independent   Transferring independent   Toileting independent   Bathing independent   Dressing independent   Eating independent   IADLs independent   Communication understands/communicates without difficulty   Swallowing swallows foods/liquids without difficulty   Baseline Diet/Method of Nutritional Intake no diet restrictions   Past History of Dysphagia no   Prior Level of Function Comment Per chart review: PMH SB6,  MT2,  No VFSS in EMR.  Pt seen by SLP at [place].   Assistive Device Currently Used at Home --  [Per chart review pt has RW at home - unclear if pt was using AD and pt unable to state PLOF at this time]           IRF PT Evaluation and Treatment - 07/05/23 1036        PT Time Calculation    Start Time 1030     Stop Time 1100     Time Calculation (min) 30 min        Session Details    Document Type Daily Treatment/Progress Note     Mode of Treatment physical therapy;individual therapy        Mobility Belt    Mobility Belt  Used for All Out of Bed Activity yes        Sit to Stand Transfer    [place], Sit to Stand Transfer close supervision     Safety/Cues impulsivity     Assistive Device none     Comment from w/c        Stand to Sit Transfer    [place], Stand to Sit Transfer close supervision     Safety/Cues impulsivity     Assistive Device none     Comment to w/c        Stand Pivot Transfer    [place], Stand Pivot/Stand Step Transfer touching/steadying assist     Safety/Cues impulsivity     Assistive Device none     Comment amb approach; steadying assist for balance.        Gait Training    [place], Gait touching/steadying assist     Safety/Cues impulsivity     Assistive Device none     Distance in Feet 300 feet     Pattern step-through     Deviations/Abnormal Patterns base of support, narrow;gait speed decreased     Bilateral Gait Deviations heel strike decreased     Comment (Gait/Stairs) 2 gait trials as noted above: 300'x1, 200'x1 with steadying assist for balance/safety.        Balance    Static Standing Balance mild impairment;unsupported     Dynamic Standing Balance mild impairment;unsupported     Comment, Balance Standing on blue foam with beach ball taps: steadying assist for balance x 20 reps.  Standing ball kicks x 20 reps B: steadying assist for balance.        Daily Progress Summary (PT)    Daily Outcome Statement Pt with improved cooperation with the gait belt today.  Pt participated in dynamic balance tasks as noted above.  Pt required max manual cueing to initiate each activity, but once started, pt with good sustained performance. All restraints in place as ordered at end of session.  Pt directly handed off to 1:1.                           IRF PT Goals    Flowsheet Row Most Recent Value   Bed Mobility Goal 1    Activity/Assistive Device bed mobility activities, all at 07/02/2023 0830   [place] supervision required at 07/02/2023 0830   Time Frame 1 week, short-term goal (STG) at 07/05/2023 0835    Strategies/Barriers not reassessed at 07/05/2023 0835   Progress/Outcome goal ongoing at 07/05/2023 0835   Bed Mobility Goal 2    Activity/Assistive Device bed mobility activities, all at 07/02/2023 0830   [place] modified independence at 07/02/2023 0830   Time Frame 2 weeks, long-term goal (LTG) at 07/05/2023 0835   Progress/Outcome goal ongoing at 07/05/2023 0835   Transfer Goal 1    Activity/Assistive Device sit-to-stand/stand-to-sit, bed-to-chair/chair-to-bed, stand pivot at 07/02/2023 0830   [place] supervision required at 07/02/2023 0830   Time Frame 1 week, short-term goal (STG) at 07/05/2023 0835   Strategies/Barriers steadying assist for safety at 07/05/2023 0835   Progress/Outcome goal ongoing at 07/05/2023 0835   Transfer Goal 2    Activity/Assistive Device sit-to-stand/stand-to-sit, bed-to-chair/chair-to-bed, stand pivot at 07/02/2023 0830   [place] modified independence at 07/02/2023 0830   Time Frame 2 weeks, long-term goal (LTG) at 07/05/2023 0835   Progress/Outcome goal ongoing at 07/05/2023 0835   Gait/Walking Locomotion Goal 1    Activity/Assistive Device gait (walking locomotion) at 07/02/2023 0830   Distance 250 feet at 07/02/2023 0830   [place] supervision required at 07/02/2023 0830   Time Frame 1 week, short-term goal (STG) at 07/05/2023 0835   Strategies/Barriers touching assist at 07/05/2023 0835   Progress/Outcome goal ongoing at 07/05/2023 0835   Gait/Walking Locomotion Goal 2    Activity/Assistive Device gait (walking locomotion) at 07/02/2023 0830   Distance 500 feet at 07/02/2023 0830   [place] supervision required at 07/02/2023 0830   Time Frame 2 weeks, long-term goal (LTG) at 07/05/2023 0835   Progress/Outcome goal ongoing at 07/05/2023 0835   Wheelchair Locomotion Goal 1    Activity wheelchair mobility skills, all at 07/02/2023 0830   Assistive Device manual, lightweight at 07/02/2023 0830   Distance 50 feet at 07/02/2023 0830   [place] minimum assist  (75% or more patient effort) at 07/02/2023 0830   Time Frame 1 week, short-term goal (STG) at 07/05/2023 0835   Strategies/Barriers lower w/c ordered at 07/05/2023 0835   Progress/Outcome goal no longer appropriate at 07/05/2023 0835   Wheelchair Locomotion Goal 2    Activity wheelchair mobility skills, all at 07/02/2023 0830   Assistive Device manual, lightweight at 07/02/2023 0830   Distance 200 feet at 07/05/2023 0835   [place] modified independence at 07/05/2023 0835   Time Frame 2 weeks, long-term goal (LTG) at 07/05/2023 0835   Progress/Outcome goal revised this date at 07/05/2023 0835   Stairs Goal 1    Activity/Assistive Device stairs, all skills at 07/02/2023 0830   Number of Stairs 16 at 07/02/2023 0830   [place] supervision required at 07/02/2023 0830   Time Frame 1 week, short-term goal (STG) at 07/05/2023 0835   Strategies/Barriers touching assist at 07/05/2023 0835   Progress/Outcome goal ongoing at 07/05/2023 0835   Stairs Goal 2    Activity/Assistive Device stairs, all skills at 07/02/2023 0830   Number of Stairs 24 at 07/02/2023 0830   [place] supervision required at 07/02/2023 0830   Time Frame 2 weeks, long-term goal (LTG) at 07/05/2023 0835   Progress/Outcome goal ongoing at 07/05/2023 0835

## 2023-07-05 NOTE — PROGRESS NOTES
Physical Medicine and Rehabilitation Progress Note          Patient was seen and examined.   Attestation Notes: Face to face encounter completed    Subjective     No acute events overnight.  Patient seen and examined this morning in room.  History limited by cog/communication impairment.      Poor po intake per nursing.  Patient seems to gesture that she does not like the food here        Review of Systems:  Negative except as per HPI    Objective     Vital signs in last 24 hours:  Temp:  [36.8 °C (98.3 °F)] 36.8 °C (98.3 °F)  Heart Rate:  [60-80] 60  Resp:  [16] 16  BP: (132-155)/(63-85) 155/85    Physical Exam      General Appearance:        Alert, cooperative, no distress   Head:    Normocephalic,atraumatic   Eyes:    Conjunctiva clear      Lungs:     Clear to auscultation bilaterally, respirations unlabored   Heart:    Regular rate and rhythm, no murmur   Abdomen:     Soft, non-tender, bowel sounds active   Extremities:     Extremities normal, atraumatic, no cyanosis or edema           Skin:   No rashes or lesions   Neurologic:          Behavior/  Emotional:   Awake, alert, aphasic, verbal output limited to incoherent sounds.  She is able to follow simple commands with increased time.  Yes/no seems reliable for basic questions-able to correctly choose first name, location and month from field of 2.  0/5 with wrist extension, , finger abduction on right.      Appropriate, cooperative                  Results from last 7 days   Lab Units 07/02/23  0603 07/01/23  0357 06/30/23  0235 06/29/23  0626   WBC K/uL 4.83 4.74 4.88 5.36   RBC M/uL 3.76* 3.62* 3.67* 3.97   HEMOGLOBIN g/dL 11.4* 11.2* 11.3* 12.4   HEMATOCRIT % 34.6* 33.4* 34.3* 36.8   PLATELETS K/uL 140* 125* 127* 142*   MCV fL 92.0 92.3 93.5 92.7   RDW % 12.8 12.7 13.0 13.0       Results from last 7 days   Lab Units 07/04/23  0519 07/03/23  0542 07/02/23  0603   SODIUM mEQ/L 141 140 141   POTASSIUM mEQ/L 3.2* 3.2* 3.0*   CHLORIDE mEQ/L 107 108* 109*    CO2 mEQ/L 26 25 24   BUN mg/dL 7 6* 6*   CREATININE mg/dL 0.6 0.6 0.6   EGFR mL/min/1.73m*2 >60.0 >60.0 >60.0   ANION GAP mEQ/L 8 7 8       Current Facility-Administered Medications   Medication Dose Route Frequency   • acetaminophen  650 mg oral q6h PRN   • apixaban  5 mg oral BID   • cycloSPORINE  1 drop Both Eyes q12h ROBERTO   • magnesium oxide  400 mg oral BID   • melatonin  3 mg oral Nightly   • pantoprazole  40 mg oral Daily   • potassium chloride  40 mEq oral TID   • rosuvastatin  20 mg oral Nightly   • senna  1 tablet oral 2x daily PRN   • trazodone  25 mg oral Nightly       Plan of care was discussed with patient                    * Acute ischemic left MCA stroke (CMS/HCC)  Assessment & Plan  The patient is a 78-year-old  female with a history of AAA, heart block s/p cardiac pacemaker, glaucoma, hyperlipidemia, coronary artery disease, NSTEMI, who presented to [facility] on June 26 after she has not been seen by her friends for a few days.   In the emergency department she was aphasic with right-sided weakness and hypertensive to the 190 systolic.  CT scan of the head revealed an acute infarct in left MCA with mild bleeding.  Echo showed an EF of 43% and a bubble study was negative.  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  Later this was transitioned to apixaban.        -Full program PT/OT/SLP/rehabilitation RN/psychology. Consult internal medicine for comorbidities.  -Continue apixaban  -Continue statin  -Sleep - melatonin and trazodone  -Safety - one-to-one continuous observation, low bed, lapbelt.  Wean restraints as able    Disposition -anticipate patient will need 24/7 supervision for safe discharge          Right wrist drop  Assessment & Plan  Splinting, contracture prevention    Left ventricular apical thrombus  Assessment & Plan  Continue apixaban    Coronary artery disease involving native heart  without angina pectoris  Assessment & Plan  Continue apixaban    Glaucoma  Assessment & Plan  Continue eye drops    Follow-up exam  Assessment & Plan  PCP  Cardiology Dr. Lorenzo for her cardiac function and anticoagulation treatment  Neurology

## 2023-07-05 NOTE — PLAN OF CARE
Plan of Care Review  Plan of Care Reviewed With: patient  Progress: improving  Outcome Evaluation: Patient is alert very aphasic and anxious but able to follow simple commands. PRN tylenol adminstered for backache with effctiveness. Around a clock 1:1 for safety. Continent of urine, ambulate to bathroom with min. assistance. Medications adminstered with puddings. Encouraged fluids. Slept well.

## 2023-07-05 NOTE — PLAN OF CARE
Problem: Rehabilitation (IRF) Plan of Care  Goal: Plan of Care Review  Outcome: Progressing  Flowsheets (Taken 7/5/2023 5537)  Progress: improving  Plan of Care Reviewed With: patient  Outcome Evaluation: Pt continent of bladder. Continues to have 1:1 at bedside. Has a very poor appetite and didnt eat breakfast or lunch. Many options were offered but pt didnt like the sound of any. Medications crushed in pudding. Doesnt always like taking her medication, but is eventually agreeable. Call bell within reach at all times.   Plan of Care Review  Plan of Care Reviewed With: patient  Progress: improving  Outcome Evaluation: Pt continent of bladder. Continues to have 1:1 at bedside. Has a very poor appetite and didnt eat breakfast or lunch. Many options were offered but pt didnt like the sound of any. Medications crushed in pudding. Doesnt always like taking her medication, but is eventually agreeable. Call bell within reach at all times.

## 2023-07-05 NOTE — PROGRESS NOTES
Patient: Jennifer Sams  Location: [facility] [address]  MRN: [id number]  Today's date: 7/5/2023    History of Present Illness  Jennifer is a 78 y.o. female admitted on 7/1/2023 with Acute ischemic left MCA stroke (CMS/HCC) [I63.512]. Principal problem is Acute ischemic left MCA stroke (CMS/HCC).    The patient is a 78-year-old  female with a history of AAA, heart block s/p cardiac pacemaker, glaucoma, hyperlipidemia, coronary artery disease, NSTEMI, who presented to [facility] on June 26 after she has not been seen by her friends for a few days.  Patient lives alone and when she was found by EMS she was confused and combative.  In the emergency department she was aphasic with right-sided weakness and hypertensive to the 190 systolic.  CT scan of the head revealed an acute infarct in left MCA with mild bleeding.  Echo showed an EF of 43% and a bubble study was negative.  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  Later this was transitioned to apixaban.  She was continued on statin for secondary prevention.  She was evaluated by speech therapy and cleared for a soft and bite-size diet with moderately thick liquids.    7/2/2023:  Diet SB6 with mildly thick liquids (MT2)    Past Medical History  Jennifer has a past medical history of AAA (abdominal aortic aneurysm), Arthritis, Elevated blood pressure reading without diagnosis of hypertension (4/19/2019), Epistaxis (1/6/2020), GERD (gastroesophageal reflux disease) (4/19/2019), Glaucoma (4/19/2019), Heart murmur, Hiatal hernia, History of hip replacement, total, right (4/19/2019), History of rheumatic fever as a child (4/19/2019), Hypercholesterolemia (4/19/2019), Ischemic cardiomyopathy (5/9/2019), Kidney cysts, Osteoarthritis of multiple joints (4/19/2019), Osteoporosis, Pacemaker (12/9/2019), Raynaud's disease (4/19/2019), 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        SLP Pain    Date/Time Pain Type Rating: Rest Rating: Activity [facility]   07/05/23 1133 Pain Assessment 0 - no pain 0 - no pain CMP   07/05/23 1238 Pain Reassessment 0 - no pain 0 - no pain CMP          Prior Living Environment    Flowsheet Row Most Recent Value   People in Home alone   Current Living Arrangements home   Home Accessibility --  [2 SH with 1 DARÍO]   Living Environment Comment Per chart review, pt lives alone in a 2SH with 1STE   Number of Stairs, Main Entrance 1   Number of Stairs, Within Home, Primary 12  [Full fight to second floor]          Prior Level of Function    Flowsheet Row Most Recent Value   Dominant Hand --  [Unable to determine 2/2 cognitive/communication deficits]   Ambulation independent   Transferring independent   Toileting independent   Bathing independent   Dressing independent   Eating independent   IADLs independent   Communication understands/communicates without difficulty   Swallowing swallows foods/liquids without difficulty   Baseline Diet/Method of Nutritional Intake no diet restrictions   Past History of Dysphagia no   Prior Level of Function Comment Per chart review: PMH SB6,  MT2,  No VFSS in EMR.  Pt seen by SLP at [place].   Assistive Device Currently Used at Home --  [Per chart review pt has RW at home - unclear if pt was using AD and pt unable to state PLOF at this time]           IRF SLP Evaluation and Treatment - 07/05/23 1133        SLP Time Calculation    Start Time 1130     Stop Time 1240     Time Calculation (min) 70 min        Session Details    Document Type Daily Treatment/Progress Note     Mode of Treatment speech language pathology;individual therapy        General Information    General Observations of Patient Pt received in her room, upright in w/c. Direct handoff from/to 1:1 at beginning and end of  "session. Pleasant and cooperative.        Cognition/Psychosocial    Comment, Cognition Pt perseverative on raising the heighter of her bed and finding missing clothes from closet during session. Demonstrated frustration d/t expressive deficits by banging her fist against w/c and tray table throughout.        Motor Speech    Comment, Motor Speech Intervention Using mirror for visual biofeedback, pt completed OMEs to target R-sided facial weakness as follows: x5 labial retraction, x5 labial protrusion, x5 puffing up cheeks with air for 3 second hold. Pt repeated single syllable CV words:  \"bye\": 2/10 trials (improved in fxl context of SLP walking away), \"hi\": 4/10 trials, \"yes\": 5/5 trials, \"no\": 5/5 trials, \"Sharlene\": approximated on 1/5 trials given max cues. Inconsistent vowel productions noted throughout.        Auditory Comprehension    Auditory Comprehension Intervention comprehension of questions/sentences     Comment, Intervention (Auditory Comprehension) Pt answered bio y/n questions with 80% acc. given slow presentation of each question. Did not benefit from single written words to aid comprehension. Given single pictures of food chocies, pt provided y/n responses to indicate preferred items to improve overall intake.        Verbal Expression    Verbal Expression Intervention word retrieval activities     Comment, Intervention (Verbal Expression) Pt utilized verbal y/n with head shake/nod to answer questions throughout session. Automatics: in attempt to count from 1-5 in unison with SLP, pt pserverating on \"yes\" across all trials. Pt with excellent IND use of gestures to aid communicative effectiveness (e.g., pointing to the bed and moving hand in upward direction for raising bed).        Food and Liquid Trials (NIS)    Comment Pt refused all food items on lunch tray and options provided for other items (RN made aware). Per RN and PCT, pt did not consume any items on breakfast tray this date. Pt consumed 4 oz " of ice water from cup edge during session with single delayed cough observed. Unable to observe pt with thins in context of a meal.        Daily Progress Summary (SLP)    Daily Outcome Statement Pt refused all food items presented on lunch tray; consumed cup of water with single cough. Difficulty with automatic speech tasks and benefited from max cues for repetition of CV words. Benefits from use of gestures to improve communciation.ST to continue targeting communication and swallowing goals per POC.                      IRF SLP Goals    Flowsheet Row Most Recent Value   Auditory Comprehension Goal 1    Auditory Comprehension Goal 1 pt will complete basic Aud comprehension tasks (simple yes/no 75%),  1 step commands 50% with max cues,  match word heard to picture f/o 2 50% max cues at 07/02/2023 1001   Time Frame short-term goal (STG), 1 week at 07/02/2023 1001   Progress/Outcome goal ongoing at 07/04/2023 1510   Auditory Comprehension Goal 2    Auditory Comprehension Goal 2 Pt will understand mod-complex level y/n questions 90% min cues,  2 step commands 75% min-mod cues,  at 07/02/2023 1001   Time Frame long-term goal (LTG), 4 weeks at 07/02/2023 1001   Progress/Outcome goal ongoing at 07/04/2023 1510   Verbal Expression Goal 1    Verbal Expression Goal 1 Pt will complete basic VE tasks (name. automatics, simple opposites, naming) with 20% accuracy and max cues. at 07/02/2023 1001   Time Frame short-term goal (STG), 1 week at 07/02/2023 1001   Progress/Outcome goal ongoing at 07/04/2023 1510   Verbal Expression Goal 2    Verbal Expression Goal 2 Pt will complete Verbal expression tasks (basic-mod) with 50-75% accuracy and min-mod cues.  Pt will imitate vowels 50% max cues,  at 07/02/2023 1001   Time Frame long-term goal (LTG), 4 weeks at 07/02/2023 1001   Progress/Outcome goal ongoing at 07/04/2023 1510   Motor Speech/Voice Goal 1    Motor Speech/Voice Goal 1 Repetition of vowels, CV/VC and CVC targets with max  cues and models for 60% acc. at 07/04/2023 1510   Time Frame short-term goal (STG), 2 weeks at 07/04/2023 1510   Progress/Outcome goal ongoing at 07/04/2023 1510   Motor Speech/Voice Goal 2    Motor Speech/Voice Goal 2 Repetition of vowels, CV/VC and CVC targets with min cues and models for 90% acc. at 07/04/2023 1510   Time Frame long-term goal (LTG), 4 weeks at 07/04/2023 1510   Progress/Outcome goal ongoing at 07/04/2023 1510   Reading Comprehension Goal 1    Reading Comprehension Goal 1 Pt will match word to picture field of 2 with 50% accuracy and mod-max cues,  Pt will read single functional words with 20% accuracy max cues at 07/02/2023 1001   Time Frame short-term goal (STG), 1 week at 07/02/2023 1001   Progress/Outcome goal no longer appropriate at 07/04/2023 1510   Reading Comprehension Goal 2    Reading Comprehension Goal 2 Pt will read simple sentences and comprehend simple sentences with 75% accuracy min-mod cues at 07/02/2023 1001   Time Frame long-term goal (LTG) at 07/02/2023 1001   Progress/Outcome goal no longer appropriate at 07/04/2023 1510   Written Expression Goal 1    Written Expression Goal 1 Pt will copy first name, shapes, letters and 3-4 letter words with 80% min cues,  pt will copy letters, name shapes with 50% accuracy and mod cues. at 07/02/2023 1001   Time Frame short-term goal (STG), 1 week at 07/02/2023 1001   Progress/Outcome goal no longer appropriate at 07/04/2023 1510   Written Expression Goal 2    Written Expression Goal 2 Pt will write first and last name,  single words 90% accuracy at 07/02/2023 1001   Progress/Outcome goal no longer appropriate at 07/04/2023 1510   Oral Nutrition/Hydration Goal 1    Activity effective/safe, use of swallowing techniques, management of texture/viscosity, other (see comments)  [SB6,  MT2.  Trials of thins and various textures by speech with min s/s of aspiration] at 07/02/2023 1001   Time Frame short-term goal (STG), 14 days or less at  07/02/2023 1001   Progress/Outcome goal ongoing at 07/04/2023 1510   Oral Nutrition/Hydration Goal 2    Activity effective/safe, oral nutrition/hydration, use of swallowing techniques, management of texture/viscosity, other (see comments)  [regular and thins] at 07/02/2023 1001   Time Frame long-term goal (LTG), 4 weeks at 07/02/2023 1001   Progress/Outcome goal ongoing at 07/04/2023 1510   Oral Motor Exercise Goal 1    Activity perform oral motor strengthening exercises, facial/cheek, lip, tongue, strength/ROM, mastication/bolus manipulation, 5-10 times per session, other (see comments)  [use mirror/visual model] at 07/02/2023 1001   [place] with 1:1 supervision/constant cues at 07/02/2023 1001   Time Frame short-term goal (STG), 14 days or less at 07/02/2023 1001   Progress/Outcome goal ongoing at 07/04/2023 1510   Oral Motor Exercise Goal 2    Activity perform oral motor strengthening exercises, facial/cheek, lip, tongue, strength/ROM, mastication/bolus manipulation, other (see comments)  [2-3 times a day sets of 10-15] at 07/02/2023 1001   [place] with minimal cues (75-90% accuracy) at 07/02/2023 1001   Time Frame long-term goal (LTG), 4 weeks at 07/02/2023 1001   Progress/Outcome goal ongoing at 07/04/2023 1510

## 2023-07-06 ENCOUNTER — APPOINTMENT (INPATIENT)
Dept: SPEECH THERAPY | Facility: REHABILITATION | Age: 78
DRG: 057 | End: 2023-07-06
Payer: MEDICARE

## 2023-07-06 ENCOUNTER — APPOINTMENT (INPATIENT)
Dept: OTHER | Facility: REHABILITATION | Age: 78
DRG: 057 | End: 2023-07-06
Payer: MEDICARE

## 2023-07-06 ENCOUNTER — APPOINTMENT (INPATIENT)
Dept: OCCUPATIONAL THERAPY | Facility: REHABILITATION | Age: 78
DRG: 057 | End: 2023-07-06
Payer: MEDICARE

## 2023-07-06 ENCOUNTER — TELEPHONE (OUTPATIENT)
Dept: NEUROLOGY | Facility: CLINIC | Age: 78
End: 2023-07-06
Payer: MEDICARE

## 2023-07-06 ENCOUNTER — APPOINTMENT (INPATIENT)
Dept: PHYSICAL THERAPY | Facility: REHABILITATION | Age: 78
DRG: 057 | End: 2023-07-06
Payer: MEDICARE

## 2023-07-06 LAB
ANION GAP SERPL CALC-SCNC: 6 MEQ/L (ref 3–15)
BUN SERPL-MCNC: 7 MG/DL (ref 7–25)
CALCIUM SERPL-MCNC: 9.4 MG/DL (ref 8.6–10.3)
CHLORIDE SERPL-SCNC: 107 MEQ/L (ref 98–107)
CO2 SERPL-SCNC: 28 MEQ/L (ref 21–31)
CREAT SERPL-MCNC: 0.8 MG/DL (ref 0.6–1.2)
GFR SERPL CREATININE-BSD FRML MDRD: >60 ML/MIN/1.73M*2
GLUCOSE SERPL-MCNC: 105 MG/DL (ref 70–99)
POTASSIUM SERPL-SCNC: 4.5 MEQ/L (ref 3.5–5.1)
SODIUM SERPL-SCNC: 141 MEQ/L (ref 136–145)

## 2023-07-06 PROCEDURE — 80048 BASIC METABOLIC PNL TOTAL CA: CPT | Performed by: HOSPITALIST

## 2023-07-06 PROCEDURE — 97535 SELF CARE MNGMENT TRAINING: CPT | Mod: GO

## 2023-07-06 PROCEDURE — 92526 ORAL FUNCTION THERAPY: CPT | Mod: GN

## 2023-07-06 PROCEDURE — 97799 UNLISTED PHYSCL MED/REHAB PX: CPT

## 2023-07-06 PROCEDURE — 63700000 HC SELF-ADMINISTRABLE DRUG: Performed by: HOSPITALIST

## 2023-07-06 PROCEDURE — 97530 THERAPEUTIC ACTIVITIES: CPT | Mod: GP,59

## 2023-07-06 PROCEDURE — 12800000 HC ROOM AND CARE SEMIPRIVATE REHAB

## 2023-07-06 PROCEDURE — 63700000 HC SELF-ADMINISTRABLE DRUG: Performed by: STUDENT IN AN ORGANIZED HEALTH CARE EDUCATION/TRAINING PROGRAM

## 2023-07-06 PROCEDURE — 92507 TX SP LANG VOICE COMM INDIV: CPT | Mod: GN

## 2023-07-06 PROCEDURE — 97530 THERAPEUTIC ACTIVITIES: CPT | Mod: GO,59

## 2023-07-06 PROCEDURE — 36415 COLL VENOUS BLD VENIPUNCTURE: CPT | Performed by: HOSPITALIST

## 2023-07-06 RX ADMIN — Medication 3 MG: at 20:14

## 2023-07-06 RX ADMIN — POTASSIUM CHLORIDE 40 MEQ: 750 TABLET, EXTENDED RELEASE ORAL at 08:13

## 2023-07-06 RX ADMIN — CYCLOSPORINE 1 DROP: 0.5 EMULSION OPHTHALMIC at 08:10

## 2023-07-06 RX ADMIN — Medication 400 MG: at 08:09

## 2023-07-06 RX ADMIN — APIXABAN 5 MG: 5 TABLET, FILM COATED ORAL at 20:14

## 2023-07-06 RX ADMIN — Medication 400 MG: at 20:14

## 2023-07-06 RX ADMIN — TRAZODONE HYDROCHLORIDE 25 MG: 50 TABLET, FILM COATED ORAL at 20:14

## 2023-07-06 RX ADMIN — ROSUVASTATIN CALCIUM 20 MG: 20 TABLET, FILM COATED ORAL at 20:14

## 2023-07-06 RX ADMIN — LANSOPRAZOLE 30 MG: KIT at 08:49

## 2023-07-06 RX ADMIN — CYCLOSPORINE 1 DROP: 0.5 EMULSION OPHTHALMIC at 20:14

## 2023-07-06 RX ADMIN — APIXABAN 5 MG: 5 TABLET, FILM COATED ORAL at 08:14

## 2023-07-06 NOTE — PLAN OF CARE
Problem: Rehabilitation (IRF) Plan of Care  Goal: Plan of Care Review  Outcome: Progressing  Flowsheets (Taken 7/6/2023 1431)  Progress: improving  Plan of Care Reviewed With: patient  Outcome Evaluation: Pt continent of bladder. Refusing to eat breakfast. Ate half of her grilled cheese sandwich with speech at lunch. Refused labs this morning for night shift. Had a hard time giving her her meds, but she finally took them after education. She was very tearful and upset throughout the day. Floor bed and lap belt in place with 1:1 at bedside with call bell within reach at all times.   Plan of Care Review  Plan of Care Reviewed With: patient  Progress: improving  Outcome Evaluation: Pt continent of bladder. Refusing to eat breakfast. Ate half of her grilled cheese sandwich with speech at lunch. Refused labs this morning for night shift. Had a hard time giving her her meds, but she finally took them after education. She was very tearful and upset throughout the day. Floor bed and lap belt in place with 1:1 at bedside with call bell within reach at all times.

## 2023-07-06 NOTE — PROGRESS NOTES
Physical Medicine and Rehabilitation Progress Note          Patient was seen and examined.   Attestation Notes: Face to face encounter completed    Subjective     No acute events overnight.  Patient seen and examined this morning in room.  History limited by cog/communication impairment.      Refused labs yesterday and this AM.    Review of Systems:  Negative except as per HPI    Objective     Vital signs in last 24 hours:  Temp:  [36.6 °C (97.9 °F)] 36.6 °C (97.9 °F)  Heart Rate:  [59-90] 59  Resp:  [16-17] 16  BP: (133-168)/(65-85) 154/85    Physical Exam      General Appearance:        Alert, cooperative, no distress   Head:    Normocephalic,atraumatic   Eyes:    Conjunctiva clear      Lungs:     Clear to auscultation bilaterally, respirations unlabored   Heart:    Regular rate and rhythm, no murmur   Abdomen:     Soft, non-tender, bowel sounds active   Extremities:     Extremities normal, atraumatic, no cyanosis or edema           Skin:   No rashes or lesions   Neurologic:          Behavior/  Emotional:   Awake, alert, aphasic, verbal output limited to incoherent sounds.  She is able to follow simple commands with increased time.  Yes/no seems reliable for basic questions-able to correctly choose first name, type of location and month from field of 2.  0/5 with wrist extension, , finger abduction on right.      Appropriate, cooperative                  Results from last 7 days   Lab Units 07/02/23  0603 07/01/23  0357 06/30/23  0235   WBC K/uL 4.83 4.74 4.88   RBC M/uL 3.76* 3.62* 3.67*   HEMOGLOBIN g/dL 11.4* 11.2* 11.3*   HEMATOCRIT % 34.6* 33.4* 34.3*   PLATELETS K/uL 140* 125* 127*   MCV fL 92.0 92.3 93.5   RDW % 12.8 12.7 13.0       Results from last 7 days   Lab Units 07/04/23  0519 07/03/23  0542 07/02/23  0603   SODIUM mEQ/L 141 140 141   POTASSIUM mEQ/L 3.2* 3.2* 3.0*   CHLORIDE mEQ/L 107 108* 109*   CO2 mEQ/L 26 25 24   BUN mg/dL 7 6* 6*   CREATININE mg/dL 0.6 0.6 0.6   EGFR mL/min/1.73m*2 >60.0  >60.0 >60.0   ANION GAP mEQ/L 8 7 8       Current Facility-Administered Medications   Medication Dose Route Frequency   • acetaminophen  650 mg oral q6h PRN   • apixaban  5 mg oral BID   • cycloSPORINE  1 drop Both Eyes q12h ROBERTO   • lansoprazole  30 mg oral Daily before breakfast   • magnesium oxide  400 mg oral BID   • melatonin  3 mg oral Nightly   • potassium chloride  40 mEq oral TID   • rosuvastatin  20 mg oral Nightly   • senna  1 tablet oral 2x daily PRN   • trazodone  25 mg oral Nightly       Plan of care was discussed with patient                    * Acute ischemic left MCA stroke (CMS/HCC)  Assessment & Plan  The patient is a 78-year-old  female with a history of AAA, heart block s/p cardiac pacemaker, glaucoma, hyperlipidemia, coronary artery disease, NSTEMI, who presented to [facility] on June 26 after she has not been seen by her friends for a few days.   In the emergency department she was aphasic with right-sided weakness and hypertensive to the 190 systolic.  CT scan of the head revealed an acute infarct in left MCA with mild bleeding.  Echo showed an EF of 43% and a bubble study was negative.  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  Later this was transitioned to apixaban.        -Full program PT/OT/SLP/rehabilitation RN/psychology. Consult internal medicine for comorbidities.  -Continue apixaban  -Continue statin  -Sleep - melatonin and trazodone  -Safety - one-to-one continuous observation, low bed, lapbelt.  Wean restraints as able    Disposition -anticipate patient will need 24/7 supervision for safe discharge          Right wrist drop  Assessment & Plan  Splinting, contracture prevention    Left ventricular apical thrombus  Assessment & Plan  Continue apixaban    Coronary artery disease involving native heart without angina pectoris  Assessment & Plan  Continue apixaban    Glaucoma  Assessment  & Plan  Continue eye drops    Follow-up exam  Assessment & Plan  Dispo: 7/14 will need at leas supervision at time of DC from IRF      PCP  Cardiology Dr. Lorenzo for her cardiac function and anticoagulation treatment  Neurology

## 2023-07-06 NOTE — PROGRESS NOTES
Patient: Jennifer Sams  Location: [facility] [address]  MRN: [id number]  Today's date: 7/6/2023    History of Present Illness  Jennifer is a 78 y.o. female admitted on 7/1/2023 with Acute ischemic left MCA stroke (CMS/HCC) [I63.512]. Principal problem is Acute ischemic left MCA stroke (CMS/HCC).    The patient is a 78-year-old  female with a history of AAA, heart block s/p cardiac pacemaker, glaucoma, hyperlipidemia, coronary artery disease, NSTEMI, who presented to [facility] on June 26 after she has not been seen by her friends for a few days.  Patient lives alone and when she was found by EMS she was confused and combative.  In the emergency department she was aphasic with right-sided weakness and hypertensive to the 190 systolic.  CT scan of the head revealed an acute infarct in left MCA with mild bleeding.  Echo showed an EF of 43% and a bubble study was negative.  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  Later this was transitioned to apixaban.  She was continued on statin for secondary prevention.  She was evaluated by speech therapy and cleared for a soft and bite-size diet with moderately thick liquids.    7/2/2023:  Diet SB6 with mildly thick liquids (MT2)    Past Medical History  Jennifer has a past medical history of AAA (abdominal aortic aneurysm), Arthritis, Elevated blood pressure reading without diagnosis of hypertension (4/19/2019), Epistaxis (1/6/2020), GERD (gastroesophageal reflux disease) (4/19/2019), Glaucoma (4/19/2019), Heart murmur, Hiatal hernia, History of hip replacement, total, right (4/19/2019), History of rheumatic fever as a child (4/19/2019), Hypercholesterolemia (4/19/2019), Ischemic cardiomyopathy (5/9/2019), Kidney cysts, Osteoarthritis of multiple joints (4/19/2019), Osteoporosis, Pacemaker (12/9/2019), Raynaud's disease (4/19/2019), 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        SLP Pain    Date/Time Pain Type Rating: Rest Rating: Activity [facility]   07/06/23 0901 Pain Assessment 0 - no pain 0 - no pain CMP   07/06/23 0928 Pain Reassessment 0 - no pain 0 - no pain CMP          Prior Living Environment    Flowsheet Row Most Recent Value   People in Home alone   Current Living Arrangements home   Home Accessibility --  [2 SH with 1 DARÍO]   Living Environment Comment Per chart review, pt lives alone in a 2SH with 1STE   Number of Stairs, Main Entrance 1   Number of Stairs, Within Home, Primary 12  [Full fight to second floor]          Prior Level of Function    Flowsheet Row Most Recent Value   Dominant Hand --  [Unable to determine 2/2 cognitive/communication deficits]   Ambulation independent   Transferring independent   Toileting independent   Bathing independent   Dressing independent   Eating independent   IADLs independent   Communication understands/communicates without difficulty   Swallowing swallows foods/liquids without difficulty   Baseline Diet/Method of Nutritional Intake no diet restrictions   Past History of Dysphagia no   Prior Level of Function Comment Per chart review: PMH SB6,  MT2,  No VFSS in EMR.  Pt seen by SLP at [place].   Assistive Device Currently Used at Home --  [Per chart review pt has RW at home - unclear if pt was using AD and pt unable to state PLOF at this time]           IRF SLP Evaluation and Treatment - 07/06/23 0901        SLP Time Calculation    Start Time 0900     Stop Time 0930     Time Calculation (min) 30 min        Session Details    Document Type Daily Treatment/Progress Note     Mode of Treatment speech language pathology;individual therapy        General Information    General Observations of Patient Pt received in [facility], upright in w/c. Handoff from/to 1:1 pre/post session. Cooperative  "throughout.        Cognition/Psychosocial    Comment, Cognition Pt labile throughout, crying at the beginning of session. Supportive listening provided throughout. Pt expressing frustration at hospitalization, discomfort in w/c, and dislike for food.        Motor Speech    Comment, Motor Speech Intervention Given max verbal/visual cues, pt appropximated \"hi\" with productions of \"I\" on 8/10 productions. Pt able to produce \"h\" given max cues for production. Perseverations on \"yes\" observed, pt aware of error and became increasingly frustrated. Pt unable to achieve articulatory placement for \"b\" for productions of \"bye\", produced \"I\" on 5/5 trials.        Auditory Comprehension    Auditory Comprehension Intervention comprehension of single words     Comment, Intervention (Auditory Comprehension) Pt completed object identification task given FO2 (objects from LARK box) with 10/10 acc. I'ly. Prior to receiving prompt, pt immediately grabbed/touched items presented on the L-side but then corrected following prompt.        Verbal Expression    Verbal Expression Intervention word retrieval activities     Comment, Intervention (Verbal Expression) Spontaneous productions consisted of single words \"yes, no, me you, I\". Pt hummed \"Happy Birthday\" I'ly with 100% acc. Pt attempted to sing along to \"My Girl\" and \"Sugar Pie, [place]\" on 4 occasions. Each time, pt did not produce the words to the song but produced \"yes\" with correct intonation. Pt utilized gestures to communicate needs with SLP throughout session (e.g., bathroom needs).        Food and Liquid Trials (NIS)    Comment Pt consumed 4 oz of ice water from cup edge. No anterior bolus loss observed, no overt s/sx of aspiration. Will attempt to see pt with thins in context of a meal at lunch. If pt further refuses thins during meal, will initiate FWP.        Daily Progress Summary (SLP)    Daily Outcome Statement Pt labile this date, expressing frustration with " hospitalization. Completed object identification in FO2 with 100% acc. I'ly. Utilized gestures to aid communication; pt consistently producing 5 single words. Mod-max difficulty with repetition of CV words. Consumed thin liquids without s/sx of aspiration. ST to continue targeting communication and swallowing goals.                      IRF SLP Goals    Flowsheet Row Most Recent Value   Auditory Comprehension Goal 1    Auditory Comprehension Goal 1 pt will complete basic Aud comprehension tasks (simple yes/no 75%),  1 step commands 50% with max cues,  match word heard to picture f/o 2 50% max cues at 07/02/2023 1001   Time Frame short-term goal (STG), 1 week at 07/02/2023 1001   Progress/Outcome goal ongoing at 07/04/2023 1510   Auditory Comprehension Goal 2    Auditory Comprehension Goal 2 Pt will understand mod-complex level y/n questions 90% min cues,  2 step commands 75% min-mod cues,  at 07/02/2023 1001   Time Frame long-term goal (LTG), 4 weeks at 07/02/2023 1001   Progress/Outcome goal ongoing at 07/04/2023 1510   Verbal Expression Goal 1    Verbal Expression Goal 1 Pt will complete basic VE tasks (name. automatics, simple opposites, naming) with 20% accuracy and max cues. at 07/02/2023 1001   Time Frame short-term goal (STG), 1 week at 07/02/2023 1001   Progress/Outcome goal ongoing at 07/04/2023 1510   Verbal Expression Goal 2    Verbal Expression Goal 2 Pt will complete Verbal expression tasks (basic-mod) with 50-75% accuracy and min-mod cues.  Pt will imitate vowels 50% max cues,  at 07/02/2023 1001   Time Frame long-term goal (LTG), 4 weeks at 07/02/2023 1001   Progress/Outcome goal ongoing at 07/04/2023 1510   Motor Speech/Voice Goal 1    Motor Speech/Voice Goal 1 Repetition of vowels, CV/VC and CVC targets with max cues and models for 60% acc. at 07/04/2023 1510   Time Frame short-term goal (STG), 2 weeks at 07/04/2023 1510   Progress/Outcome goal ongoing at 07/04/2023 1510   Motor Speech/Voice Goal 2     Motor Speech/Voice Goal 2 Repetition of vowels, CV/VC and CVC targets with min cues and models for 90% acc. at 07/04/2023 1510   Time Frame long-term goal (LTG), 4 weeks at 07/04/2023 1510   Progress/Outcome goal ongoing at 07/04/2023 1510   Reading Comprehension Goal 1    Reading Comprehension Goal 1 Pt will match word to picture field of 2 with 50% accuracy and mod-max cues,  Pt will read single functional words with 20% accuracy max cues at 07/02/2023 1001   Time Frame short-term goal (STG), 1 week at 07/02/2023 1001   Progress/Outcome goal no longer appropriate at 07/04/2023 1510   Reading Comprehension Goal 2    Reading Comprehension Goal 2 Pt will read simple sentences and comprehend simple sentences with 75% accuracy min-mod cues at 07/02/2023 1001   Time Frame long-term goal (LTG) at 07/02/2023 1001   Progress/Outcome goal no longer appropriate at 07/04/2023 1510   Written Expression Goal 1    Written Expression Goal 1 Pt will copy first name, shapes, letters and 3-4 letter words with 80% min cues,  pt will copy letters, name shapes with 50% accuracy and mod cues. at 07/02/2023 1001   Time Frame short-term goal (STG), 1 week at 07/02/2023 1001   Progress/Outcome goal no longer appropriate at 07/04/2023 1510   Written Expression Goal 2    Written Expression Goal 2 Pt will write first and last name,  single words 90% accuracy at 07/02/2023 1001   Progress/Outcome goal no longer appropriate at 07/04/2023 1510   Oral Nutrition/Hydration Goal 1    Activity effective/safe, use of swallowing techniques, management of texture/viscosity, other (see comments)  [SB6,  MT2.  Trials of thins and various textures by speech with min s/s of aspiration] at 07/02/2023 1001   Time Frame short-term goal (STG), 14 days or less at 07/02/2023 1001   Progress/Outcome goal ongoing at 07/04/2023 1510   Oral Nutrition/Hydration Goal 2    Activity effective/safe, oral nutrition/hydration, use of swallowing techniques, management of  texture/viscosity, other (see comments)  [regular and thins] at 07/02/2023 1001   Time Frame long-term goal (LTG), 4 weeks at 07/02/2023 1001   Progress/Outcome goal ongoing at 07/04/2023 1510   Oral Motor Exercise Goal 1    Activity perform oral motor strengthening exercises, facial/cheek, lip, tongue, strength/ROM, mastication/bolus manipulation, 5-10 times per session, other (see comments)  [use mirror/visual model] at 07/02/2023 1001   [place] with 1:1 supervision/constant cues at 07/02/2023 1001   Time Frame short-term goal (STG), 14 days or less at 07/02/2023 1001   Progress/Outcome goal ongoing at 07/04/2023 1510   Oral Motor Exercise Goal 2    Activity perform oral motor strengthening exercises, facial/cheek, lip, tongue, strength/ROM, mastication/bolus manipulation, other (see comments)  [2-3 times a day sets of 10-15] at 07/02/2023 1001   [place] with minimal cues (75-90% accuracy) at 07/02/2023 1001   Time Frame long-term goal (LTG), 4 weeks at 07/02/2023 1001   Progress/Outcome goal ongoing at 07/04/2023 1510

## 2023-07-06 NOTE — PROGRESS NOTES
Patient: Jennifer Sams  Location: [facility] [address]  MRN: [id number]  Today's date: 7/6/2023    Hospital Course  History of Present Illness  Jennifer is a 78 y.o. female admitted on 7/1/2023 with Acute ischemic left MCA stroke (CMS/HCC) [I63.512]. Principal problem is Acute ischemic left MCA stroke (CMS/HCC).    The patient is a 78-year-old  female with a history of AAA, heart block s/p cardiac pacemaker, glaucoma, hyperlipidemia, coronary artery disease, NSTEMI, who presented to [facility] on June 26 after she has not been seen by her friends for a few days.  Patient lives alone and when she was found by EMS she was confused and combative.  In the emergency department she was aphasic with right-sided weakness and hypertensive to the 190 systolic.  CT scan of the head revealed an acute infarct in left MCA with mild bleeding.  Echo showed an EF of 43% and a bubble study was negative.  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  Later this was transitioned to apixaban.  She was continued on statin for secondary prevention.  She was evaluated by speech therapy and cleared for a soft and bite-size diet with moderately thick liquids.    7/2/2023:  Diet SB6 with mildly thick liquids (MT2)    Past Medical History  Jennifer has a past medical history of AAA (abdominal aortic aneurysm), Arthritis, Elevated blood pressure reading without diagnosis of hypertension (4/19/2019), Epistaxis (1/6/2020), GERD (gastroesophageal reflux disease) (4/19/2019), Glaucoma (4/19/2019), Heart murmur, Hiatal hernia, History of hip replacement, total, right (4/19/2019), History of rheumatic fever as a child (4/19/2019), Hypercholesterolemia (4/19/2019), Ischemic cardiomyopathy (5/9/2019), Kidney cysts, Osteoarthritis of multiple joints (4/19/2019), Osteoporosis, Pacemaker (12/9/2019), Raynaud's disease (4/19/2019), RSD  (reflex sympathetic dystrophy) (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            Prior Living Environment    Flowsheet Row Most Recent Value   People in Home alone   Current Living Arrangements home   Home Accessibility --  [2 SH with 1 DARÍO]   Living Environment Comment Per chart review, pt lives alone in a 2SH with 1STE   Number of Stairs, Main Entrance 1   Number of Stairs, Within Home, Primary 12  [Full fight to second floor]          Prior Level of Function    Flowsheet Row Most Recent Value   Dominant Hand --  [Unable to determine 2/2 cognitive/communication deficits]   Ambulation independent   Transferring independent   Toileting independent   Bathing independent   Dressing independent   Eating independent   IADLs independent   Communication understands/communicates without difficulty   Swallowing swallows foods/liquids without difficulty   Baseline Diet/Method of Nutritional Intake no diet restrictions   Past History of Dysphagia no   Prior Level of Function Comment Per chart review: PMH SB6,  MT2,  No VFSS in EMR.  Pt seen by SLP at [place].   Assistive Device Currently Used at Home --  [Per chart review pt has RW at home - unclear if pt was using AD and pt unable to state PLOF at this time]           Recreational Therapy Evaluation and Treatment - 07/06/23 0931        Session Details    Document Type Initial Evaluation     Mode of Treatment individual therapy;recreational therapy        Rec Time Calculation    Start Time 0930     Stop Time 1030     Time Calculation (min) 60 min        General Information    Patient/Family/Caregiver Comments/Observations direct hand off from ST     General Observations of Patient received in room;agreeable to tx        Lifestyle/Recreational History/Interest    Current Living Situation alone        Recreational History and Interests    How Important is Recreation to You? high importance      Satisfied With Leisure Lifestyle? yes     Participate Regularly in Leisure Activity? yes     Are You a Social Person? other (see comments)     Do You Prefer To Be Alone? other (see comments)   sometimes    Do You Like New Experiences? yes     Current Hobbies/Interests arts/crafts;interaction with pets;care of plants;exercise/fitness;gardening;music;outdoor activities;group/social activities;social media/computer activities;spectator events;television/movies/videos   has a 3 yo cat,vegetable/flower gardening    Art/Crafts other (see comments)   art appreciation    Exercise/Fitness walking/running   stretching    Music singing;listening to music   Oldies,[employer] Tunes    Outdoor Activities picnics/cookouts     Group/Social Activities dining out;shopping;parties/seasonal programs     Spectator Events movies at [employer]       Coping/Community Reintegration    Observed Emotional State calm        Coping Strategies    Trust Relationship/Rapport care explained;choices provided;emotional support provided;empathic listening provided;questions answered;questions encouraged;thoughts/feelings acknowledged;reassurance provided     Quality of Life Promotion (Coping Strategies) balance between activity/rest encouraged;community involvement promoted;expression of thoughts about present/future encouraged;independence in all possible areas promoted;social interaction/involvement encouraged;wellness behaviors promoted     Comment Pt identifies c being c her cat as form of relaxation        Community Reintegration (Rec Therapy)    Facilitators integration into community life     Barriers type of disability     Recreational Activities movies;outdoor activities;shopping;restaurants;visiting friends/family        Recreational Therapy Assessment/Plan    Patient/Family Goal (Rec Therapy) Pt would like to be bale to get out again     Recreational Therapy Goals/Objectives improve;balance;adjustment to  disability/illness;cognitive skills;communication skills;coping through leisure/recreation interventions;coping skills/strategies;fine motor skills;gross motor skills;functional leisure skills;leisure participation;observable affect;physical skills;problem-solving;relaxation response;social skill and interaction     Orientation to Recreational Therapy patient;received explanation of evening recreation programs;completed therapeutic recreation assessment     Recreational Therapy Plan community reintegration;community activities;independent leisure participation;structured leisure participation;leisure exploration        Rec Therapy Clinical Impression    Patient's Goals for Discharge take care of myself at home;return to all previous roles/activities     Therapy Plan Review (Rec Therapy) Recreational Therapy plan for care discussed with patient;patient feedback incorporated in Recreational Therapy plan for care;patient voices agreement with Recreational Therapy plan for care     Functional Limitations in Following Categories self-care;home management;community/leisure     Rehab Potential/Prognosis good, to achieve stated therapy goals     Frequency of Treatment (Rec Therapy) 30 minutes per session;60-90 minutes per session;3-5 times per week     Activity Limitations Related to Problem List functional mobility not performed adequately or safely for community activity     Daily Outcome Statement RT evaluation completed                      Recreational Therapy Goals    Flowsheet Row Most Recent Value   Community Goal 2    Activity: Community Participate in community reintegration c min A at 07/06/2023 0931   [place] minimum assist (75% or more patient effort) at 07/06/2023 0931   Time Frame long-term goal (LTG), 14 days or less at 07/06/2023 0931   Leisure Goal 1    Activity: Leisure Participate in RT tasks c focus on amb c min A 150' on even surfaces at 07/06/2023 0931   [place] minimum assist (75% or more  patient effort) at 07/06/2023 0931   Time Frame short-term goal (STG), 3 - 5 days at 07/06/2023 0931   Leisure Goal 2    Activity: Leisure Participate in RT tasks c focus on amb c min A 150' on uneven surfaces at 07/06/2023 0931   [place] minimum assist (75% or more patient effort) at 07/06/2023 0931   Time Frame long-term goal (LTG), 14 days or less at 07/06/2023 0931   Additional Goal 1    Activity: Additional Goal Participate in RT tasks c focus on integration of RUE into BUE task at 07/06/2023 0931   [place] supervision required at 07/06/2023 0931   Time Frame short-term goal (STG), 3 - 5 days at 07/06/2023 0931   Additional Goal 2    Activity: Additional Goal Participate in RT tasks c focus on GMC of RUE at 07/06/2023 0931   [place] supervision required at 07/06/2023 0931   Time Frame long-term goal (LTG), 14 days or less at 07/06/2023 0931

## 2023-07-06 NOTE — PLAN OF CARE
Plan of Care Review  Plan of Care Reviewed With: patient  Progress: improving  Outcome Evaluation: Patient is alert, anxious, continue on 1:1 sitter for safety. Denies any discomfort.Continent of urine, ambulate to bathroom with min. assistance. Patient remains has poor intake, consumed pudding with encouragement,fluid encouraged. Slept well w/o any distress.

## 2023-07-06 NOTE — PROGRESS NOTES
Patient: Jennifer Sams  Location: [facility] [address]  MRN: [id number]  Today's date: 7/6/2023    History of Present Illness  Jennifer is a 78 y.o. female admitted on 7/1/2023 with Acute ischemic left MCA stroke (CMS/HCC) [I63.512]. Principal problem is Acute ischemic left MCA stroke (CMS/HCC).    The patient is a 78-year-old  female with a history of AAA, heart block s/p cardiac pacemaker, glaucoma, hyperlipidemia, coronary artery disease, NSTEMI, who presented to [facility] on June 26 after she has not been seen by her friends for a few days.  Patient lives alone and when she was found by EMS she was confused and combative.  In the emergency department she was aphasic with right-sided weakness and hypertensive to the 190 systolic.  CT scan of the head revealed an acute infarct in left MCA with mild bleeding.  Echo showed an EF of 43% and a bubble study was negative.  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  Later this was transitioned to apixaban.  She was continued on statin for secondary prevention.  She was evaluated by speech therapy and cleared for a soft and bite-size diet with moderately thick liquids.    7/2/2023:  Diet SB6 with mildly thick liquids (MT2)  7/6/2023: Upgraded to EC7 with thin liquids    Past Medical History  Jennifer has a past medical history of AAA (abdominal aortic aneurysm), Arthritis, Elevated blood pressure reading without diagnosis of hypertension (4/19/2019), Epistaxis (1/6/2020), GERD (gastroesophageal reflux disease) (4/19/2019), Glaucoma (4/19/2019), Heart murmur, Hiatal hernia, History of hip replacement, total, right (4/19/2019), History of rheumatic fever as a child (4/19/2019), Hypercholesterolemia (4/19/2019), Ischemic cardiomyopathy (5/9/2019), Kidney cysts, Osteoarthritis of multiple joints (4/19/2019), Osteoporosis, Pacemaker (12/9/2019),  Raynaud's disease (4/19/2019), RSD (reflex sympathetic dystrophy) (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        SLP Pain    Date/Time Pain Type Rating: Rest Rating: Activity [facility]   07/06/23 1202 Pain Assessment 0 - no pain 0 - no pain CMP   07/06/23 1228 Pain Reassessment 0 - no pain 0 - no pain CMP          Prior Living Environment    Flowsheet Row Most Recent Value   People in Home alone   Current Living Arrangements home   Home Accessibility --  [2 SH with 1 DARÍO]   Living Environment Comment Per chart review, pt lives alone in a 2SH with 1STE   Number of Stairs, Main Entrance 1   Number of Stairs, Within Home, Primary 12  [Full fight to second floor]          Prior Level of Function    Flowsheet Row Most Recent Value   Dominant Hand --  [Unable to determine 2/2 cognitive/communication deficits]   Ambulation independent   Transferring independent   Toileting independent   Bathing independent   Dressing independent   Eating independent   IADLs independent   Communication understands/communicates without difficulty   Swallowing swallows foods/liquids without difficulty   Baseline Diet/Method of Nutritional Intake no diet restrictions   Past History of Dysphagia no   Prior Level of Function Comment Per chart review: PMH SB6,  MT2,  No VFSS in EMR.  Pt seen by SLP at [place].   Assistive Device Currently Used at Home --  [Per chart review pt has RW at home - unclear if pt was using AD and pt unable to state PLOF at this time]           IRF SLP Evaluation and Treatment - 07/06/23 1202        SLP Time Calculation    Start Time 1200     Stop Time 1230     Time Calculation (min) 30 min        Session Details    Document Type Daily Treatment/Progress Note     Mode of Treatment speech language pathology;individual therapy        General Information    Patient/Family/Caregiver Comments/Observations Pt's friend Annia present for family  training     General Observations of Patient Pt received in handoff from OT at beginning of session and handed off to 1:1 at the end. Cooperative throughout.        Food and Liquid Trials (NIS)    Patient Positioning upright in wheelchair     Oral Intake/Feeding Performance set-up of food/liquid needed     Liquid Consistencies Evaluated thin liquids     Thin Liquids patient-controlled amounts;single cup sips     Comment, Thin Liquids 4 oz ice water in context of meal     Food Consistencies Evaluated easy to chew (EC7)     Easy to Chew (EC7) use of teaspoon/fork     Comment, Easy to Chew (EC7) grilled cheese (cut up by SLP), french fries     Oral Preparatory Phase of Swallow mild impairment;mastication, slow but effective;oral retention, right lateral sulci     Oral Phase of Swallow oral residue, incomplete swallow     Comment, Oral Phase Pt with improved acceptance and mastication of EC7 items. Pt with mildly prolonged mastication and mild R-sided oral residue. When made aware of residue, pt utilized finger sweep to clear.     Pharyngeal Phase of Swallow no clinical symptoms     Comment, Pharyngeal Phase No overt s/sx of aspiration throughout meal     Esophageal Phase of Swallow no clinical symptoms     Comment Pt observed with EC7/thin trial lunch. Pt consumed thin liquids in context of a meal with no s/sx of aspiration. I'ly took small sips of liquids with no anterior bolus loss. Given small bites of EC7 solids, pt managed with mildly prolonged mastication. Mild amounts of oral residue visualized in R-buccal cavity without pt awareness. Given cue, pt cleared residue with finger sweep and then liquid wash. Recommended continued 1:1 supervision during meals for safety with advanced solids.        Swallowing Recommendations    Diet Consistency Recommendations easy to chew (EC7);thin liquids     Medication Administration crushed with pureed     Supervision Level for Intake 1:1 supervision needed     Feeding/Delivery  Recommendations allow patient to feed self if maintaining safety;check mouth frequently for oral residue/pocketing;stop meal if showing signs of aspiration or fatigue (e.g., coughing, wet voice);small bites/sips     Posture Recommendations fully upright in chair/chair mode of bed     Swallowing Strategies alternate food and liquid intake;other (see comments)   R lingual sweep       Daily Progress Summary (SLP)    Daily Outcome Statement Pt observed with EC7/thin trial lunch. Pt consumed thin liquids in context of a meal with no s/sx of aspiration. I'ly took small sips of liquids with no anterior bolus loss. Given small bites of EC7 solids, pt managed with mildly prolonged mastication. Mild amounts of oral residue visualized in R-buccal cavity without pt awareness, cleared given single cue. Recommend upgrade to EC7/thins with continued 1:1 supervision during meals and plan for diet tolerance monitoring.ST to continue targeting communication and swallowing goals.                 Education Documentation  Diet Modification, taught by Isabel Orantes, CF-SLP at 7/6/2023  1:04 PM.  Learner: Patient  Readiness: Acceptance  Method: Explanation  Response: Needs Reinforcement  Comment: Education on diet upgrade to EC7/thins          IRF SLP Goals    Flowsheet Row Most Recent Value   Auditory Comprehension Goal 1    Auditory Comprehension Goal 1 pt will complete basic Aud comprehension tasks (simple yes/no 75%),  1 step commands 50% with max cues,  match word heard to picture f/o 2 50% max cues at 07/02/2023 1001   Time Frame short-term goal (STG), 1 week at 07/02/2023 1001   Progress/Outcome goal ongoing at 07/04/2023 1510   Auditory Comprehension Goal 2    Auditory Comprehension Goal 2 Pt will understand mod-complex level y/n questions 90% min cues,  2 step commands 75% min-mod cues,  at 07/02/2023 1001   Time Frame long-term goal (LTG), 4 weeks at 07/02/2023 1001   Progress/Outcome goal ongoing at 07/04/2023 1510    Verbal Expression Goal 1    Verbal Expression Goal 1 Pt will complete basic VE tasks (name. automatics, simple opposites, naming) with 20% accuracy and max cues. at 07/02/2023 1001   Time Frame short-term goal (STG), 1 week at 07/02/2023 1001   Progress/Outcome goal ongoing at 07/04/2023 1510   Verbal Expression Goal 2    Verbal Expression Goal 2 Pt will complete Verbal expression tasks (basic-mod) with 50-75% accuracy and min-mod cues.  Pt will imitate vowels 50% max cues,  at 07/02/2023 1001   Time Frame long-term goal (LTG), 4 weeks at 07/02/2023 1001   Progress/Outcome goal ongoing at 07/04/2023 1510   Motor Speech/Voice Goal 1    Motor Speech/Voice Goal 1 Repetition of vowels, CV/VC and CVC targets with max cues and models for 60% acc. at 07/04/2023 1510   Time Frame short-term goal (STG), 2 weeks at 07/04/2023 1510   Progress/Outcome goal ongoing at 07/04/2023 1510   Motor Speech/Voice Goal 2    Motor Speech/Voice Goal 2 Repetition of vowels, CV/VC and CVC targets with min cues and models for 90% acc. at 07/04/2023 1510   Time Frame long-term goal (LTG), 4 weeks at 07/04/2023 1510   Progress/Outcome goal ongoing at 07/04/2023 1510   Reading Comprehension Goal 1    Reading Comprehension Goal 1 Pt will match word to picture field of 2 with 50% accuracy and mod-max cues,  Pt will read single functional words with 20% accuracy max cues at 07/02/2023 1001   Time Frame short-term goal (STG), 1 week at 07/02/2023 1001   Progress/Outcome goal no longer appropriate at 07/04/2023 1510   Reading Comprehension Goal 2    Reading Comprehension Goal 2 Pt will read simple sentences and comprehend simple sentences with 75% accuracy min-mod cues at 07/02/2023 1001   Time Frame long-term goal (LTG) at 07/02/2023 1001   Progress/Outcome goal no longer appropriate at 07/04/2023 1510   Written Expression Goal 1    Written Expression Goal 1 Pt will copy first name, shapes, letters and 3-4 letter words with 80% min cues,  pt will  copy letters, name shapes with 50% accuracy and mod cues. at 07/02/2023 1001   Time Frame short-term goal (STG), 1 week at 07/02/2023 1001   Progress/Outcome goal no longer appropriate at 07/04/2023 1510   Written Expression Goal 2    Written Expression Goal 2 Pt will write first and last name,  single words 90% accuracy at 07/02/2023 1001   Progress/Outcome goal no longer appropriate at 07/04/2023 1510   Oral Nutrition/Hydration Goal 1    Activity effective/safe, use of swallowing techniques, management of texture/viscosity, other (see comments)  [SB6,  MT2.  Trials of thins and various textures by speech with min s/s of aspiration] at 07/02/2023 1001   Time Frame short-term goal (STG), 14 days or less at 07/02/2023 1001   Progress/Outcome goal ongoing at 07/04/2023 1510   Oral Nutrition/Hydration Goal 2    Activity effective/safe, oral nutrition/hydration, use of swallowing techniques, management of texture/viscosity, other (see comments)  [regular and thins] at 07/02/2023 1001   Time Frame long-term goal (LTG), 4 weeks at 07/02/2023 1001   Progress/Outcome goal ongoing at 07/04/2023 1510   Oral Motor Exercise Goal 1    Activity perform oral motor strengthening exercises, facial/cheek, lip, tongue, strength/ROM, mastication/bolus manipulation, 5-10 times per session, other (see comments)  [use mirror/visual model] at 07/02/2023 1001   Prentice with 1:1 supervision/constant cues at 07/02/2023 1001   Time Frame short-term goal (STG), 14 days or less at 07/02/2023 1001   Progress/Outcome goal ongoing at 07/04/2023 1510   Oral Motor Exercise Goal 2    Activity perform oral motor strengthening exercises, facial/cheek, lip, tongue, strength/ROM, mastication/bolus manipulation, other (see comments)  [2-3 times a day sets of 10-15] at 07/02/2023 1001   Prentice with minimal cues (75-90% accuracy) at 07/02/2023 1001   Time Frame long-term goal (LTG), 4 weeks at 07/02/2023 1001   Progress/Outcome goal ongoing at  07/04/2023 1510

## 2023-07-06 NOTE — PROGRESS NOTES
Patient: Jennifer Sams  Location: [facility] [address]  MRN: [id number]  Today's date: 7/6/2023    History of Present Illness  Jennifer is a 78 y.o. female admitted on 7/1/2023 with Acute ischemic left MCA stroke (CMS/HCC) [I63.512]. Principal problem is Acute ischemic left MCA stroke (CMS/HCC).    The patient is a 78-year-old  female with a history of AAA, heart block s/p cardiac pacemaker, glaucoma, hyperlipidemia, coronary artery disease, NSTEMI, who presented to [facility] on June 26 after she has not been seen by her friends for a few days.  Patient lives alone and when she was found by EMS she was confused and combative.  In the emergency department she was aphasic with right-sided weakness and hypertensive to the 190 systolic.  CT scan of the head revealed an acute infarct in left MCA with mild bleeding.  Echo showed an EF of 43% and a bubble study was negative.  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  Later this was transitioned to apixaban.  She was continued on statin for secondary prevention.  She was evaluated by speech therapy and cleared for a soft and bite-size diet with moderately thick liquids.    7/2/2023:  Diet SB6 with mildly thick liquids (MT2)    Past Medical History  Jennifer has a past medical history of AAA (abdominal aortic aneurysm), Arthritis, Elevated blood pressure reading without diagnosis of hypertension (4/19/2019), Epistaxis (1/6/2020), GERD (gastroesophageal reflux disease) (4/19/2019), Glaucoma (4/19/2019), Heart murmur, Hiatal hernia, History of hip replacement, total, right (4/19/2019), History of rheumatic fever as a child (4/19/2019), Hypercholesterolemia (4/19/2019), Ischemic cardiomyopathy (5/9/2019), Kidney cysts, Osteoarthritis of multiple joints (4/19/2019), Osteoporosis, Pacemaker (12/9/2019), Raynaud's disease (4/19/2019), 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        OT Vitals    Date/Time Pulse HR Source BP BP Location BP Method Pt Position [facility]   07/06/23 0702 59 Left Radial 154/85 Left upper arm Manual Sitting AK      OT Pain    Date/Time Pain Type Rating: Rest Rating: Activity [facility]   07/06/23 0702 Pain Assessment 0 - no pain 0 - no pain AK   07/06/23 0727 Pain Reassessment 0 - no pain 0 - no pain AK          Prior Living Environment    Flowsheet Row Most Recent Value   People in Home alone   Current Living Arrangements home   Home Accessibility --  [2 SH with 1 DARÍO]   Living Environment Comment Per chart review, pt lives alone in a 2SH with 1STE   Number of Stairs, Main Entrance 1   Number of Stairs, Within Home, Primary 12  [Full fight to second floor]          Prior Level of Function    Flowsheet Row Most Recent Value   Dominant Hand --  [Unable to determine 2/2 cognitive/communication deficits]   Ambulation independent   Transferring independent   Toileting independent   Bathing independent   Dressing independent   Eating independent   IADLs independent   Communication understands/communicates without difficulty   Swallowing swallows foods/liquids without difficulty   Baseline Diet/Method of Nutritional Intake no diet restrictions   Past History of Dysphagia no   Prior Level of Function Comment Per chart review: PMH SB6,  MT2,  No VFSS in EMR.  Pt seen by SLP at [place].   Assistive Device Currently Used at Home --  [Per chart review pt has RW at home - unclear if pt was using AD and pt unable to state PLOF at this time]          Occupational Profile    Flowsheet Row Most Recent Value   Occupational History/Life Experiences Will need to clarify PLOF/driving status. Pt unable to state 2/2 cognitive/communication deficits.   Environmental Supports and Barriers Per chart review, pt lives alone but has supportive friends.    Patient Goals Pt unable to state goal 2/2 communication deficits.           [facility] OT Evaluation and Treatment - 07/06/23 0702        OT Time Calculation    Start Time 0700     Stop Time 0730     Time Calculation (min) 30 min        Session Details    Document Type Daily Treatment/Progress Note     Mode of Treatment occupational therapy;individual therapy        General Information    General Observations of Patient Pt received lying supine in bed, direct handoff pre/post session to 1:1.        Mobility Belt    Mobility Belt Used for All Out of Bed Activity no     Reason Mobility Belt Not Used patient refused        Bed Mobility    Comment OT: Supine to sit EOB c Cl S.        Sit to Stand Transfer    [place], Sit to Stand Transfer close supervision     Safety/Cues impulsivity     Assistive Device none     Comment From EOB c Cl S for balance and decreased safety awareness.        Stand to Sit Transfer    [place], Stand to Sit Transfer close supervision     Safety/Cues impulsivity     Assistive Device none     Comment To EOB c Cl S for balance and decreased safety awareness.        Stand Pivot Transfer    [place], Stand Pivot/Stand Step Transfer close supervision     Safety/Cues impulsivity     Assistive Device none     Comment Via amb approach c Cl S for balance and decreased safety awareness.        Impairments/Safety Issues    Comment, Safety Issues/Impairments OT: Short HHM within pt room to/from bathroom c Steadying A s AD.        Grooming    Tasks oral care (brushing teeth, cleaning dentures);washes, rinses and dries face;washes, rinses and dries hands     [place] minimum assist (75% or more patient effort)     Safety/Cues impulsivity;sequencing;attention;initiation     Position supported standing;unsupported standing     Setup Assistance obtain supplies;open containers     Adaptive Equipment none     [place], Oral Hygiene minimum assist (75% or more patient effort)     Comment Pt stood  at sink to complete grooming tasks c Cl S for balaance. [place] assist to initiate washing R hand, however pt then resistant to [place] assist and able to wash B/L hands on own. Min A to incoroporate R UE into grooming tasks and place on anterior counter for feedback during grooming. MAX multimodal cues for sequencing/attention/initiation of grooming tasks 2/2 apraxia. Increased cues for R attention.        Daily Progress Summary (OT)    Daily Outcome Statement Pt tolerated session well c focus on short HHM within pt room and grooming tasks standing at sink. Pt continues to express frustration c communication deficits. Continue OT POC.                           IRF OT Goals    Flowsheet Row Most Recent Value   Transfer Goal 1    Activity/Assistive Device toilet at 07/02/2023 0711   Bard --  [Cl S] at 07/02/2023 0711   Time Frame short-term goal (STG), 5 - 7 days at 07/05/2023 0702   Progress/Outcome goal ongoing, goal revised this date at 07/05/2023 0702   Transfer Goal 2    Activity/Assistive Device toilet at 07/02/2023 0711   Bard supervision required at 07/02/2023 0711   Time Frame long-term goal (LTG), 14 days or less at 07/02/2023 0711   Progress/Outcome goal ongoing at 07/05/2023 0702   Transfer Goal 3    Activity/Assistive Device shower at 07/02/2023 0711   Bard --  [Cl S] at 07/02/2023 0711   Time Frame short-term goal (STG), 5 - 7 days at 07/02/2023 0711   Progress/Outcome goal ongoing, goal revised this date at 07/05/2023 0702   Transfer Goal 4    Activity/Assistive Device shower at 07/02/2023 0711   Bard supervision required at 07/02/2023 0711   Time Frame long-term goal (LTG), 14 days or less at 07/02/2023 0711   Progress/Outcome goal ongoing at 07/05/2023 0702   Bathing Goal 1    Bard --  [Steadying A] at 07/02/2023 0711   Time Frame short-term goal (STG), 5 - 7 days at 07/05/2023 0702   Progress/Outcome goal ongoing, goal revised this date at 07/05/2023 0702   Bathing  Goal 2    [place] supervision required at 07/02/2023 0711   Time Frame long-term goal (LTG), 14 days or less at 07/02/2023 0711   Progress/Outcome goal ongoing at 07/05/2023 0702   UB Dressing Goal 1    Archer --  [Cl S] at 07/05/2023 0702   Time Frame short-term goal (STG), 5 - 7 days at 07/05/2023 0702   UB Dressing Goal 2    Archer set-up required at 07/02/2023 0711   Time Frame long-term goal (LTG), 14 days or less at 07/02/2023 0711   Progress/Outcome goal ongoing at 07/05/2023 0702   LB Dressing Goal 1    Archer --  [Cl S] at 07/05/2023 0702   Time Frame short-term goal (STG), 5 - 7 days at 07/05/2023 0702   LB Dressing Goal 2    Archer supervision required at 07/02/2023 0711   Time Frame long-term goal (LTG), 14 days or less at 07/02/2023 0711   Progress/Outcome goal ongoing at 07/05/2023 0702   Grooming Goal 1    Archer --  [Steadying A] at 07/05/2023 0702   Time Frame short-term goal (STG), 5 - 7 days at 07/05/2023 0702   Progress/Outcome goal revised this date at 07/05/2023 0702   Grooming Goal 2    Archer set-up required at 07/02/2023 0711   Time Frame long-term goal (LTG), 14 days or less at 07/02/2023 0711   Progress/Outcome goal ongoing at 07/05/2023 0702   Toileting Goal 1    [place]cher --  [Cl S] at 07/05/2023 0702   Time Frame short-term goal (STG), 5 - 7 days at 07/05/2023 0702   Progress/Outcome goal met, goal revised this date at 07/05/2023 0702   Toileting Goal 2    [place] supervision required at 07/02/2023 0711   Time Frame long-term goal (LTG), 14 days or less at 07/02/2023 0711   Progress/Outcome goal ongoing at 07/05/2023 0702

## 2023-07-06 NOTE — PROGRESS NOTES
CREATIVE ARTS SESSION - MUSIC THERAPY        Patient:  Jennifer Sams  Location:  [facility] [address]  MRN:  [id number]  Today's date:  7/6/2023      Admitting Diagnosis: Acute ischemic left MCA stroke (CMS/HCC) [I63.512]       Session Details:   Session Type: Individual  Co-treatment: No  Reason for Referral:  Non-verbal    Start time: 1030    End time:  1100   Total Time:  30'       General Observations of Patient:   Start: [facility] in [facility]; patient was agreeable to therapy  End: Pt supine in bed at end of session; all needs met and direct hand-off to 1:1      OBJECTIVE     Session Goals:   Develop/ enhance overall coping skills  Improve mood  Improve quality of life  Increase stress reduction / relaxation       Pain (Adria):      Pre - Activity Post - Activity  Intervention Provided    0 0  Verbal processing     Affect/Mood:      [x] Calm  [] Anxious  [] Sad   [] Hopeful  [] Hopeless  [] Tearful   [] Bright  [] Agitated  [] Other (comment):        Music Preferences / Music Experiences:  Guitar led meditation      Interventions provided during session:   Live music listening, Music-assisted relaxation , Music-assisted breathing exercises, Verbal processing      Patient Response to Interventions:   Physical response:  Closed eyes, Nodded head        ASSESSMENT AND PLAN       Progress Summary:   Pt was met in room direct hand off from [facility]S. Pt was laying in bed and gestured with hands a sleeping motion. Pt was agreeable to live guitar music for relaxation. Pt was guided through a breathing exercise and fell asleep as evidenced by snoring, and closed eyes. Pt awoke when 1:1 returned to room to prepare pt for next session. Pt had relaxed calm affect and mood and expressed thankfulness for session using hand gestures and smiling.      Follow Up:  Therapist will continue to see pt 1-2x a week for 30-60min sessions.       Kristi Nettles M.A., MT-BC

## 2023-07-06 NOTE — PROGRESS NOTES
Patient: Jennifer Sams  Location: [facility] [address]  MRN: [id number]  Today's date: 7/6/2023    History of Present Illness  Jennifer is a 78 y.o. female admitted on 7/1/2023 with Acute ischemic left MCA stroke (CMS/HCC) [I63.512]. Principal problem is Acute ischemic left MCA stroke (CMS/HCC).    The patient is a 78-year-old  female with a history of AAA, heart block s/p cardiac pacemaker, glaucoma, hyperlipidemia, coronary artery disease, NSTEMI, who presented to [facility] on June 26 after she has not been seen by her friends for a few days.  Patient lives alone and when she was found by EMS she was confused and combative.  In the emergency department she was aphasic with right-sided weakness and hypertensive to the 190 systolic.  CT scan of the head revealed an acute infarct in left MCA with mild bleeding.  Echo showed an EF of 43% and a bubble study was negative.  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  Later this was transitioned to apixaban.  She was continued on statin for secondary prevention.  She was evaluated by speech therapy and cleared for a soft and bite-size diet with moderately thick liquids.    7/2/2023:  Diet SB6 with mildly thick liquids (MT2)  7/6/2023: Upgraded to EC7 with thin liquids    Past Medical History  Jennifer has a past medical history of AAA (abdominal aortic aneurysm), Arthritis, Elevated blood pressure reading without diagnosis of hypertension (4/19/2019), Epistaxis (1/6/2020), GERD (gastroesophageal reflux disease) (4/19/2019), Glaucoma (4/19/2019), Heart murmur, Hiatal hernia, History of hip replacement, total, right (4/19/2019), History of rheumatic fever as a child (4/19/2019), Hypercholesterolemia (4/19/2019), Ischemic cardiomyopathy (5/9/2019), Kidney cysts, Osteoarthritis of multiple joints (4/19/2019), Osteoporosis, Pacemaker (12/9/2019),  Raynaud's disease (4/19/2019), RSD (reflex sympathetic dystrophy) (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        PT Pain    Date/Time Pain Type Rating: Rest Rating: Activity [facility]   07/06/23 1313 Pain Assessment 0 - no pain 0 - no pain LBR   07/06/23 1342 Post Activity 0 - no pain 0 - no pain LBR          Prior Living Environment    Flowsheet Row Most Recent Value   People in Home alone   Current Living Arrangements home   Home Accessibility --  [2 SH with 1 DARÍO]   Living Environment Comment Per chart review, pt lives alone in a 2SH with 1STE   Number of Stairs, Main Entrance 1   Number of Stairs, Within Home, Primary 12  [Full fight to second floor]          Prior Level of Function    Flowsheet Row Most Recent Value   Dominant Hand --  [Unable to determine 2/2 cognitive/communication deficits]   Ambulation independent   Transferring independent   Toileting independent   Bathing independent   Dressing independent   Eating independent   IADLs independent   Communication understands/communicates without difficulty   Swallowing swallows foods/liquids without difficulty   Baseline Diet/Method of Nutritional Intake no diet restrictions   Past History of Dysphagia no   Prior Level of Function Comment Per chart review: PMH SB6,  MT2,  No VFSS in EMR.  Pt seen by SLP at [facility].   Assistive Device Currently Used at Home --  [Per chart review pt has RW at home - unclear if pt was using AD and pt unable to state PLOF at this time]           IRF PT Evaluation and Treatment - 07/06/23 1342        PT Time Calculation    Start Time 1300     Stop Time 1400     Time Calculation (min) 60 min        Session Details    Document Type Daily Treatment/Progress Note     Mode of Treatment physical therapy;individual therapy        Mobility Belt    Reason Mobility Belt Not Used patient refused        Sit to Stand Transfer    [place], Sit to Stand  Transfer close supervision     Safety/Cues impulsivity     Assistive Device none     Comment from mat, bed        Stand to Sit Transfer    [place], Stand to Sit Transfer close supervision     Safety/Cues impulsivity     Assistive Device none     Comment mat, bed        Stand Pivot Transfer    [place], Stand Pivot/Stand Step Transfer close supervision     Safety/Cues impulsivity     Assistive Device none     Comment amb approach        Gait Training    [place], Gait close supervision     Safety/Cues impulsivity     Assistive Device none     Distance in Feet 300 feet     Pattern step-through     Deviations/Abnormal Patterns base of support, narrow;gait speed decreased     Bilateral Gait Deviations heel strike decreased     Comment (Gait/Stairs) 2 gait trials as noted above: cl S for safety, see assessment        Balance    Static Standing Balance mild impairment;unsupported     Dynamic Standing Balance mild impairment;unsupported     Comment, Balance Standing on blue foam: at anterior table, steadying assist for balance.  Matching word/picture with <25% accuracy (pt would trial multiple puzzle pieces prior to finding the right one).        Daily Progress Summary (PT)    Daily Outcome Statement Pt received standing in her room with 1:1 present.  Pt extremely upset and perservative on getting her point across.  With max gesuring and increased time, pt was able to indicate that the floor bed and w/c are upsetting to her.  Pt's care team was informed and white board was updated to allow pt to ambulate around the unit with cl S for safety.  Pt with marked improvement in affect and overall mood once issue was adequately discussed.  Pt directly handed off to 1:1 at end of session.                           IRF PT Goals    Flowsheet Row Most Recent Value   Bed Mobility Goal 1    Activity/Assistive Device bed mobility activities, all at 07/02/2023 0830   [place] supervision required at 07/02/2023 0830    Time Frame 1 week, short-term goal (STG) at 07/05/2023 0835   Strategies/Barriers not reassessed at 07/05/2023 0835   Progress/Outcome goal ongoing at 07/05/2023 0835   Bed Mobility Goal 2    Activity/Assistive Device bed mobility activities, all at 07/02/2023 0830   [place] modified independence at 07/02/2023 0830   Time Frame 2 weeks, long-term goal (LTG) at 07/05/2023 0835   Progress/Outcome goal ongoing at 07/05/2023 0835   Transfer Goal 1    Activity/Assistive Device sit-to-stand/stand-to-sit, bed-to-chair/chair-to-bed, stand pivot at 07/02/2023 0830   [place] supervision required at 07/02/2023 0830   Time Frame 1 week, short-term goal (STG) at 07/05/2023 0835   Strategies/Barriers steadying assist for safety at 07/05/2023 0835   Progress/Outcome goal ongoing at 07/05/2023 0835   Transfer Goal 2    Activity/Assistive Device sit-to-stand/stand-to-sit, bed-to-chair/chair-to-bed, stand pivot at 07/02/2023 0830   [place] modified independence at 07/02/2023 0830   Time Frame 2 weeks, long-term goal (LTG) at 07/05/2023 0835   Progress/Outcome goal ongoing at 07/05/2023 0835   Gait/Walking Locomotion Goal 1    Activity/Assistive Device gait (walking locomotion) at 07/02/2023 0830   Distance 250 feet at 07/02/2023 0830   [place] supervision required at 07/02/2023 0830   Time Frame 1 week, short-term goal (STG) at 07/05/2023 0835   Strategies/Barriers touching assist at 07/05/2023 0835   Progress/Outcome goal ongoing at 07/05/2023 0835   Gait/Walking Locomotion Goal 2    Activity/Assistive Device gait (walking locomotion) at 07/02/2023 0830   Distance 500 feet at 07/02/2023 0830   [place] supervision required at 07/02/2023 0830   Time Frame 2 weeks, long-term goal (LTG) at 07/05/2023 0835   Progress/Outcome goal ongoing at 07/05/2023 0835   Wheelchair Locomotion Goal 1    Activity wheelchair mobility skills, all at 07/02/2023 0830   Assistive Device manual, lightweight at 07/02/2023 0830    Distance 50 feet at 07/02/2023 0830   [place] minimum assist (75% or more patient effort) at 07/02/2023 0830   Time Frame 1 week, short-term goal (STG) at 07/05/2023 0835   Strategies/Barriers lower w/c ordered at 07/05/2023 0835   Progress/Outcome goal no longer appropriate at 07/05/2023 0835   Wheelchair Locomotion Goal 2    Activity wheelchair mobility skills, all at 07/02/2023 0830   Assistive Device manual, lightweight at 07/02/2023 0830   Distance 200 feet at 07/05/2023 0835   [place] modified independence at 07/05/2023 0835   Time Frame 2 weeks, long-term goal (LTG) at 07/05/2023 0835   Progress/Outcome goal revised this date at 07/05/2023 0835   Stairs Goal 1    Activity/Assistive Device stairs, all skills at 07/02/2023 0830   Number of Stairs 16 at 07/02/2023 0830   [place] supervision required at 07/02/2023 0830   Time Frame 1 week, short-term goal (STG) at 07/05/2023 0835   Strategies/Barriers touching assist at 07/05/2023 0835   Progress/Outcome goal ongoing at 07/05/2023 0835   Stairs Goal 2    Activity/Assistive Device stairs, all skills at 07/02/2023 0830   Number of Stairs 24 at 07/02/2023 0830   [place] supervision required at 07/02/2023 0830   Time Frame 2 weeks, long-term goal (LTG) at 07/05/2023 0835   Progress/Outcome goal ongoing at 07/05/2023 0835

## 2023-07-06 NOTE — PLAN OF CARE
Problem: Rehabilitation (IRF) Plan of Care  Goal: Plan of Care Review  Outcome: Progressing  Flowsheets (Taken 7/6/2023 1010)  Plan of Care Reviewed With: patient  Outcome Evaluation: RT evaluation completed

## 2023-07-06 NOTE — PROGRESS NOTES
Patient: Jennifer Sams  Location: [facility] [address]  MRN: [id number]  Today's date: 7/6/2023    History of Present Illness  Jennifer is a 78 y.o. female admitted on 7/1/2023 with Acute ischemic left MCA stroke (CMS/HCC) [I63.512]. Principal problem is Acute ischemic left MCA stroke (CMS/HCC).    The patient is a 78-year-old  female with a history of AAA, heart block s/p cardiac pacemaker, glaucoma, hyperlipidemia, coronary artery disease, NSTEMI, who presented to [facility] on June 26 after she has not been seen by her friends for a few days.  Patient lives alone and when she was found by EMS she was confused and combative.  In the emergency department she was aphasic with right-sided weakness and hypertensive to the 190 systolic.  CT scan of the head revealed an acute infarct in left MCA with mild bleeding.  Echo showed an EF of 43% and a bubble study was negative.  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  Later this was transitioned to apixaban.  She was continued on statin for secondary prevention.  She was evaluated by speech therapy and cleared for a soft and bite-size diet with moderately thick liquids.    7/2/2023:  Diet SB6 with mildly thick liquids (MT2)  7/6/2023: Upgraded to EC7 with thin liquids    Past Medical History  Jennifer has a past medical history of AAA (abdominal aortic aneurysm), Arthritis, Elevated blood pressure reading without diagnosis of hypertension (4/19/2019), Epistaxis (1/6/2020), GERD (gastroesophageal reflux disease) (4/19/2019), Glaucoma (4/19/2019), Heart murmur, Hiatal hernia, History of hip replacement, total, right (4/19/2019), History of rheumatic fever as a child (4/19/2019), Hypercholesterolemia (4/19/2019), Ischemic cardiomyopathy (5/9/2019), Kidney cysts, Osteoarthritis of multiple joints (4/19/2019), Osteoporosis, Pacemaker (12/9/2019),  Raynaud's disease (4/19/2019), RSD (reflex sympathetic dystrophy) (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        OT Pain    Date/Time Pain Type Rating: Rest Rating: Activity [facility]   07/06/23 1105 Pain Assessment 0 - no pain 0 - no pain AK   07/06/23 1158 Pain Reassessment 0 - no pain 0 - no pain AK          Prior Living Environment    Flowsheet Row Most Recent Value   People in Home alone   Current Living Arrangements home   Home Accessibility --  [2 SH with 1 DARÍO]   Living Environment Comment Per chart review, pt lives alone in a 2SH with 1STE   Number of Stairs, Main Entrance 1   Number of Stairs, Within Home, Primary 12  [Full fight to second floor]          Prior Level of Function    Flowsheet Row Most Recent Value   Dominant Hand --  [Unable to determine 2/2 cognitive/communication deficits]   Ambulation independent   Transferring independent   Toileting independent   Bathing independent   Dressing independent   Eating independent   IADLs independent   Communication understands/communicates without difficulty   Swallowing swallows foods/liquids without difficulty   Baseline Diet/Method of Nutritional Intake no diet restrictions   Past History of Dysphagia no   Prior Level of Function Comment Per chart review: PMH SB6,  MT2,  No VFSS in EMR.  Pt seen by SLP at [place].   Assistive Device Currently Used at Home --  [Per chart review pt has RW at home - unclear if pt was using AD and pt unable to state PLOF at this time]          Occupational Profile    Flowsheet Row Most Recent Value   Occupational History/Life Experiences Will need to clarify PLOF/driving status. Pt unable to state 2/2 cognitive/communication deficits.   Environmental Supports and Barriers Per chart review, pt lives alone but has supportive friends.   Patient Goals Pt unable to state goal 2/2 communication deficits.           IRF OT Evaluation and Treatment -  07/06/23 1104        OT Time Calculation    Start Time 1100     Stop Time 1200     Time Calculation (min) 60 min        Session Details    Document Type Daily Treatment/Progress Note     Mode of Treatment occupational therapy;individual therapy        General Information    General Observations of Patient Pt received lying supine in bed, direct handoff pre/post session to 1:1.        Mobility Belt    Mobility Belt Used for All Out of Bed Activity no     Reason Mobility Belt Not Used patient refused        Orthotic Management    Orthosis Location wrist hand orthosis        Wrist/Hand Orthosis Management    Type (Wrist/Hand Orthosis) right;custom fabricated;resting wrist/hand functional position orthosis;wrist orthosis, cock-up     Position (Wrist/Hand Orthosis) wrist position, slight extension;MCP joint in flexion, 65 degrees;DIP joints in full extension;PIP joints in full extension     Fabrication Comment (Wrist/Hand Orthosis) EZ form     Wearing Schedule (Wrist/Hand Orthosis) --   Will trial for tolerance prior to initiating overnight wearing schedule    Skin Assessment (Wrist/Hand Orthosis) (1) R WHO modified - forearm piece shortened per pt preference 2/2 inreased agitation with WHO in attempts to increase pt wearing tolerance. Was unable to add straps 2/2 time constraints of session - will need further fabrication. (2) Pt also issued a R pre-velia wrist cock-up splint this session to support R wrist.        Cognition/Psychosocial    Comment, Cognition Pt emotionally labile and frustrated t/o session. Pt initially refusing transfer OOB 2/2 increased fatigue, however c encouragement, pt agreeable to participate in therapy. Pt then expressing frustration (via gestures) c w/c. Pt resisitive and frustrated c R WHO, expressing that she did not want it. With increased encouragement, pt agreeable and OT began fabrication process. During fabrication, pt began to walk out of splinting room 2/2 increased agitation, depsite  OT providing therapuetic listening and encouragement t/o. MD present during session to further educate pt on R WHO and pt agreed on trialing orthosis that did not go far up her forearm and only supported wrist/hand. OT modifying resting hand splint for pt and providing alternative (R pre-velia wrist-cock up splint) to trial. At end of session, pt visited with therapy dog, which initially pt was happy/excited while petting dog and and then became tearful after.        Sit to Stand Transfer    [place], Sit to Stand Transfer close supervision     Safety/Cues impulsivity     Assistive Device none     Comment From EOB and chair with arms.        Stand to Sit Transfer    [place], Stand to Sit Transfer close supervision     Safety/Cues impulsivity     Assistive Device none     Comment To EOB and chair with arms.        Stand Pivot Transfer    [place], Stand Pivot/Stand Step Transfer close supervision     Safety/Cues impulsivity     Assistive Device none     Comment Via amb approach c Cl S for balance and decreased safety awareness.        Impairments/Safety Issues    Comment, Safety Issues/Impairments OT: Longer distance HHM completed from pt room <> splinting room via elevator c Steadying A/Cl S s AD.        Lower Body Dressing    [place], Footwear close supervision     Comment Pt donned bilateral slip on shoes while seated EOB c Cl S.        Daily Progress Summary (OT)    Daily Outcome Statement Pt c increased frustration about R WHO, OT modified resting hand splint and issued a trial R wrist cock-up splint. Nursing edu on q2 skin checks and wear schedule. Continue OT POC.                           IRF OT Goals    Flowsheet Row Most Recent Value   Transfer Goal 1    Activity/Assistive Device toilet at 07/02/2023 0711   [place] --  [Cl S] at 07/02/2023 0711   Time Frame short-term goal (STG), 5 - 7 days at 07/05/2023 0702   Progress/Outcome goal ongoing, goal revised this date at 07/05/2023 0702    Transfer Goal 2    Activity/Assistive Device toilet at 07/02/2023 0711   [place] supervision required at 07/02/2023 0711   Time Frame long-term goal (LTG), 14 days or less at 07/02/2023 0711   Progress/Outcome goal ongoing at 07/05/2023 0702   Transfer Goal 3    Activity/Assistive Device shower at 07/02/2023 0711   [place] --  [Cl S] at 07/02/2023 0711   Time Frame short-term goal (STG), 5 - 7 days at 07/02/2023 0711   Progress/Outcome goal ongoing, goal revised this date at 07/05/2023 0702   Transfer Goal 4    Activity/Assistive Device shower at 07/02/2023 0711   [place] supervision required at 07/02/2023 0711   Time Frame long-term goal (LTG), 14 days or less at 07/02/2023 0711   Progress/Outcome goal ongoing at 07/05/2023 0702   Bathing Goal 1    [place] --  [Steadying A] at 07/02/2023 0711   Time Frame short-term goal (STG), 5 - 7 days at 07/05/2023 0702   Progress/Outcome goal ongoing, goal revised this date at 07/05/2023 0702   Bathing Goal 2    [place] supervision required at 07/02/2023 0711   Time Frame long-term goal (LTG), 14 days or less at 07/02/2023 0711   Progress/Outcome goal ongoing at 07/05/2023 0702   UB Dressing Goal 1    [place] --  [Cl S] at 07/05/2023 0702   Time Frame short-term goal (STG), 5 - 7 days at 07/05/2023 0702   UB Dressing Goal 2    [place] set-up required at 07/02/2023 0711   Time Frame long-term goal (LTG), 14 days or less at 07/02/2023 0711   Progress/Outcome goal ongoing at 07/05/2023 0702   LB Dressing Goal 1    [place] --  [Cl S] at 07/05/2023 0702   Time Frame short-term goal (STG), 5 - 7 days at 07/05/2023 0702   LB Dressing Goal 2    [place] supervision required at 07/02/2023 0711   Time Frame long-term goal (LTG), 14 days or less at 07/02/2023 0711   Progress/Outcome goal ongoing at 07/05/2023 0702   Grooming Goal 1    [place] --  [Steadying A] at 07/05/2023 0702   Time Frame short-term goal (STG), 5 - 7 days at  07/05/2023 0702   Progress/Outcome goal revised this date at 07/05/2023 0702   Grooming Goal 2    Hand set-up required at 07/02/2023 0711   Time Frame long-term goal (LTG), 14 days or less at 07/02/2023 0711   Progress/Outcome goal ongoing at 07/05/2023 0702   Toileting Goal 1    Hand --  [Cl S] at 07/05/2023 0702   Time Frame short-term goal (STG), 5 - 7 days at 07/05/2023 0702   Progress/Outcome goal met, goal revised this date at 07/05/2023 0702   Toileting Goal 2    Hand supervision required at 07/02/2023 0711   Time Frame long-term goal (LTG), 14 days or less at 07/02/2023 0711   Progress/Outcome goal ongoing at 07/05/2023 0702

## 2023-07-06 NOTE — PROGRESS NOTES
Patient: Jennifer Sams  Location: [facility] [address]  MRN: [id number]  Today's date: 7/6/2023    Hospital Course  History of Present Illness  Jennifer is a 78 y.o. female admitted on 7/1/2023 with Acute ischemic left MCA stroke (CMS/HCC) [I63.512]. Principal problem is Acute ischemic left MCA stroke (CMS/HCC).    The patient is a 78-year-old  female with a history of AAA, heart block s/p cardiac pacemaker, glaucoma, hyperlipidemia, coronary artery disease, NSTEMI, who presented to [facility] on June 26 after she has not been seen by her friends for a few days.  Patient lives alone and when she was found by EMS she was confused and combative.  In the emergency department she was aphasic with right-sided weakness and hypertensive to the 190 systolic.  CT scan of the head revealed an acute infarct in left MCA with mild bleeding.  Echo showed an EF of 43% and a bubble study was negative.  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  Later this was transitioned to apixaban.  She was continued on statin for secondary prevention.  She was evaluated by speech therapy and cleared for a soft and bite-size diet with moderately thick liquids.    7/2/2023:  Diet SB6 with mildly thick liquids (MT2)    Past Medical History  Jennifer has a past medical history of AAA (abdominal aortic aneurysm), Arthritis, Elevated blood pressure reading without diagnosis of hypertension (4/19/2019), Epistaxis (1/6/2020), GERD (gastroesophageal reflux disease) (4/19/2019), Glaucoma (4/19/2019), Heart murmur, Hiatal hernia, History of hip replacement, total, right (4/19/2019), History of rheumatic fever as a child (4/19/2019), Hypercholesterolemia (4/19/2019), Ischemic cardiomyopathy (5/9/2019), Kidney cysts, Osteoarthritis of multiple joints (4/19/2019), Osteoporosis, Pacemaker (12/9/2019), Raynaud's disease (4/19/2019), RSD  (reflex sympathetic dystrophy) (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            Prior Living Environment    Flowsheet Row Most Recent Value   People in Home alone   Current Living Arrangements home   Home Accessibility --  [2 SH with 1 DARÍO]   Living Environment Comment Per chart review, pt lives alone in a 2SH with 1STE   Number of Stairs, Main Entrance 1   Number of Stairs, Within Home, Primary 12  [Full fight to second floor]          Prior Level of Function    Flowsheet Row Most Recent Value   Dominant Hand --  [Unable to determine 2/2 cognitive/communication deficits]   Ambulation independent   Transferring independent   Toileting independent   Bathing independent   Dressing independent   Eating independent   IADLs independent   Communication understands/communicates without difficulty   Swallowing swallows foods/liquids without difficulty   Baseline Diet/Method of Nutritional Intake no diet restrictions   Past History of Dysphagia no   Prior Level of Function Comment Per chart review: PMH SB6,  MT2,  No VFSS in EMR.  Pt seen by SLP at [facility].   Assistive Device Currently Used at Home --  [Per chart review pt has RW at home - unclear if pt was using AD and pt unable to state PLOF at this time]           Recreational Therapy Evaluation and Treatment - 07/06/23 0931        Session Details    Document Type Initial Evaluation     Mode of Treatment individual therapy;recreational therapy        Rec Time Calculation    Start Time 0930     Stop Time 1030     Time Calculation (min) 60 min        General Information    Patient/Family/Caregiver Comments/Observations direct hand off from ST     General Observations of Patient received in room;agreeable to tx        Lifestyle/Recreational History/Interest    Current Living Situation alone        Recreational History and Interests    How Important is Recreation to You? high importance      Satisfied With Leisure Lifestyle? yes     Participate Regularly in Leisure Activity? yes     Are You a Social Person? other (see comments)     Do You Prefer To Be Alone? other (see comments)   sometimes    Do You Like New Experiences? yes     Current Hobbies/Interests arts/crafts;interaction with pets;care of plants;exercise/fitness;gardening;music;outdoor activities;group/social activities;social media/computer activities;spectator events;television/movies/videos   has a 5 yo cat,vegetable/flower gardening    Art/Crafts other (see comments)   art appreciation    Exercise/Fitness walking/running   stretching    Music singing;listening to music   Oldies,[employer]unes    Outdoor Activities picnics/cookouts     Group/Social Activities dining out;shopping;parties/seasonal programs     Spectator Events movies at [employer]       Coping/Community Reintegration    Observed Emotional State calm        Coping Strategies    Trust Relationship/Rapport care explained;choices provided;emotional support provided;empathic listening provided;questions answered;questions encouraged;thoughts/feelings acknowledged;reassurance provided     Quality of Life Promotion (Coping Strategies) balance between activity/rest encouraged;community involvement promoted;expression of thoughts about present/future encouraged;independence in all possible areas promoted;social interaction/involvement encouraged;wellness behaviors promoted     Comment Pt identifies c being c her cat as form of relaxation        Community Reintegration (Rec Therapy)    Facilitators integration into community life     Barriers type of disability     Recreational Activities movies;outdoor activities;shopping;restaurants;visiting friends/family        Recreational Therapy Assessment/Plan    Patient/Family Goal (Rec Therapy) Pt would like to be bale to get out again     Recreational Therapy Goals/Objectives improve;balance;adjustment to  disability/illness;cognitive skills;communication skills;coping through leisure/recreation interventions;coping skills/strategies;fine motor skills;gross motor skills;functional leisure skills;leisure participation;observable affect;physical skills;problem-solving;relaxation response;social skill and interaction     Orientation to Recreational Therapy patient;received explanation of evening recreation programs;completed therapeutic recreation assessment     Recreational Therapy Plan community reintegration;community activities;independent leisure participation;structured leisure participation;leisure exploration        Rec Therapy Clinical Impression    Patient's Goals for Discharge take care of myself at home;return to all previous roles/activities     Therapy Plan Review (Rec Therapy) Recreational Therapy plan for care discussed with patient;patient feedback incorporated in Recreational Therapy plan for care;patient voices agreement with Recreational Therapy plan for care     Functional Limitations in Following Categories self-care;home management;community/leisure     Rehab Potential/Prognosis good, to achieve stated therapy goals     Frequency of Treatment (Rec Therapy) 30 minutes per session;60-90 minutes per session;3-5 times per week     Activity Limitations Related to Problem List functional mobility not performed adequately or safely for community activity     Daily Outcome Statement RT evaluation completed                      Recreational Therapy Goals    Flowsheet Row Most Recent Value   Community Goal 2    Activity: Community Participate in community reintegration c min A at 07/06/2023 0931   [place] minimum assist (75% or more patient effort) at 07/06/2023 0931   Time Frame long-term goal (LTG), 14 days or less at 07/06/2023 0931   Leisure Goal 1    Activity: Leisure Participate in RT tasks c focus on amb c min A 150' on even surfaces at 07/06/2023 0931   [place] minimum assist (75% or more  patient effort) at 07/06/2023 0931   Time Frame short-term goal (STG), 3 - 5 days at 07/06/2023 0931   Leisure Goal 2    Activity: Leisure Participate in RT tasks c focus on amb c min A 150' on uneven surfaces at 07/06/2023 0931   [place] minimum assist (75% or more patient effort) at 07/06/2023 0931   Time Frame long-term goal (LTG), 14 days or less at 07/06/2023 0931   Additional Goal 1    Activity: Additional Goal Participate in RT tasks c focus on integration of RUE into BUE task at 07/06/2023 0931   [place] supervision required at 07/06/2023 0931   Time Frame short-term goal (STG), 3 - 5 days at 07/06/2023 0931   Additional Goal 2    Activity: Additional Goal Participate in RT tasks c focus on GMC of RUE at 07/06/2023 0931   [place] supervision required at 07/06/2023 0931   Time Frame long-term goal (LTG), 14 days or less at 07/06/2023 0931

## 2023-07-06 NOTE — TELEPHONE ENCOUNTER
Left voice mail to schedule a [facility] with Dr. Aparicio.    Please transfer patient to [facility] [phone].

## 2023-07-07 ENCOUNTER — APPOINTMENT (INPATIENT)
Dept: PHYSICAL THERAPY | Facility: REHABILITATION | Age: 78
DRG: 057 | End: 2023-07-07
Payer: MEDICARE

## 2023-07-07 ENCOUNTER — APPOINTMENT (INPATIENT)
Dept: OCCUPATIONAL THERAPY | Facility: REHABILITATION | Age: 78
DRG: 057 | End: 2023-07-07
Payer: MEDICARE

## 2023-07-07 ENCOUNTER — APPOINTMENT (INPATIENT)
Dept: SPEECH THERAPY | Facility: REHABILITATION | Age: 78
DRG: 057 | End: 2023-07-07
Payer: MEDICARE

## 2023-07-07 LAB
ANION GAP SERPL CALC-SCNC: 9 MEQ/L (ref 3–15)
BUN SERPL-MCNC: 9 MG/DL (ref 7–25)
CALCIUM SERPL-MCNC: 9.4 MG/DL (ref 8.6–10.3)
CHLORIDE SERPL-SCNC: 104 MEQ/L (ref 98–107)
CO2 SERPL-SCNC: 26 MEQ/L (ref 21–31)
CREAT SERPL-MCNC: 0.7 MG/DL (ref 0.6–1.2)
GFR SERPL CREATININE-BSD FRML MDRD: >60 ML/MIN/1.73M*2
GLUCOSE SERPL-MCNC: 90 MG/DL (ref 70–99)
POTASSIUM SERPL-SCNC: 4.4 MEQ/L (ref 3.5–5.1)
SODIUM SERPL-SCNC: 139 MEQ/L (ref 136–145)

## 2023-07-07 PROCEDURE — 63700000 HC SELF-ADMINISTRABLE DRUG: Performed by: HOSPITALIST

## 2023-07-07 PROCEDURE — 97110 THERAPEUTIC EXERCISES: CPT | Mod: GO,CO,59

## 2023-07-07 PROCEDURE — 92526 ORAL FUNCTION THERAPY: CPT | Mod: GN

## 2023-07-07 PROCEDURE — 92507 TX SP LANG VOICE COMM INDIV: CPT | Mod: GN

## 2023-07-07 PROCEDURE — 97530 THERAPEUTIC ACTIVITIES: CPT | Mod: GP,59

## 2023-07-07 PROCEDURE — 80048 BASIC METABOLIC PNL TOTAL CA: CPT | Performed by: PHYSICAL MEDICINE & REHABILITATION

## 2023-07-07 PROCEDURE — 97110 THERAPEUTIC EXERCISES: CPT | Mod: GP,59

## 2023-07-07 PROCEDURE — 36415 COLL VENOUS BLD VENIPUNCTURE: CPT | Performed by: PHYSICAL MEDICINE & REHABILITATION

## 2023-07-07 PROCEDURE — 12800000 HC ROOM AND CARE SEMIPRIVATE REHAB

## 2023-07-07 PROCEDURE — 97530 THERAPEUTIC ACTIVITIES: CPT | Mod: GO,CO,59

## 2023-07-07 PROCEDURE — 97116 GAIT TRAINING THERAPY: CPT | Mod: GP

## 2023-07-07 PROCEDURE — 63700000 HC SELF-ADMINISTRABLE DRUG: Performed by: STUDENT IN AN ORGANIZED HEALTH CARE EDUCATION/TRAINING PROGRAM

## 2023-07-07 RX ADMIN — CYCLOSPORINE 1 DROP: 0.5 EMULSION OPHTHALMIC at 09:26

## 2023-07-07 RX ADMIN — ROSUVASTATIN CALCIUM 20 MG: 20 TABLET, FILM COATED ORAL at 20:57

## 2023-07-07 RX ADMIN — TRAZODONE HYDROCHLORIDE 25 MG: 50 TABLET, FILM COATED ORAL at 20:57

## 2023-07-07 RX ADMIN — Medication 3 MG: at 20:57

## 2023-07-07 RX ADMIN — APIXABAN 5 MG: 5 TABLET, FILM COATED ORAL at 20:57

## 2023-07-07 RX ADMIN — APIXABAN 5 MG: 5 TABLET, FILM COATED ORAL at 09:19

## 2023-07-07 RX ADMIN — Medication 400 MG: at 09:21

## 2023-07-07 RX ADMIN — CYCLOSPORINE 1 DROP: 0.5 EMULSION OPHTHALMIC at 20:57

## 2023-07-07 NOTE — PLAN OF CARE
Plan of Care Review  Plan of Care Reviewed With: patient  Progress: improving  Outcome Evaluation: Pt is AOX2 w/expressive aphasia; pt is continent of bowel and bladder; pt has poor eating habits does not like hospital food;  pt is 1:1 for safety due to impulsiveness; pt is currently in bed w/1:1 at bedside.

## 2023-07-07 NOTE — PROGRESS NOTES
Patient: Jennifer Sams  Location: [facility] [address]  MRN: [id number]  Today's date: 7/7/2023    History of Present Illness  Jennifer is a 78 y.o. female admitted on 7/1/2023 with Acute ischemic left MCA stroke (CMS/HCC) [I63.512]. Principal problem is Acute ischemic left MCA stroke (CMS/HCC).    The patient is a 78-year-old  female with a history of AAA, heart block s/p cardiac pacemaker, glaucoma, hyperlipidemia, coronary artery disease, NSTEMI, who presented to [facility] on June 26 after she has not been seen by her friends for a few days.  Patient lives alone and when she was found by EMS she was confused and combative.  In the emergency department she was aphasic with right-sided weakness and hypertensive to the 190 systolic.  CT scan of the head revealed an acute infarct in left MCA with mild bleeding.  Echo showed an EF of 43% and a bubble study was negative.  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  Later this was transitioned to apixaban.  She was continued on statin for secondary prevention.  She was evaluated by speech therapy and cleared for a soft and bite-size diet with moderately thick liquids.    7/2/2023:  Diet SB6 with mildly thick liquids (MT2)  7/6/2023: Upgraded to EC7 with thin liquids    Past Medical History  Jennifer has a past medical history of AAA (abdominal aortic aneurysm), Arthritis, Elevated blood pressure reading without diagnosis of hypertension (4/19/2019), Epistaxis (1/6/2020), GERD (gastroesophageal reflux disease) (4/19/2019), Glaucoma (4/19/2019), Heart murmur, Hiatal hernia, History of hip replacement, total, right (4/19/2019), History of rheumatic fever as a child (4/19/2019), Hypercholesterolemia (4/19/2019), Ischemic cardiomyopathy (5/9/2019), Kidney cysts, Osteoarthritis of multiple joints (4/19/2019), Osteoporosis, Pacemaker (12/9/2019),  Raynaud's disease (4/19/2019), RSD (reflex sympathetic dystrophy) (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        SLP Pain    Date/Time Pain Type Rating: Rest Rating: Activity [facility]   07/07/23 0833 Pain Assessment 0 - no pain 0 - no pain CMP   07/07/23 0929 Pain Reassessment 0 - no pain 0 - no pain CMP          Prior Living Environment    Flowsheet Row Most Recent Value   People in Home alone   Current Living Arrangements home   Home Accessibility --  [2 SH with 1 DARÍO]   Living Environment Comment Per chart review, pt lives alone in a 2SH with 1STE   Number of Stairs, Main Entrance 1   Number of Stairs, Within Home, Primary 12  [Full fight to second floor]          Prior Level of Function    Flowsheet Row Most Recent Value   Dominant Hand --  [Unable to determine 2/2 cognitive/communication deficits]   Ambulation independent   Transferring independent   Toileting independent   Bathing independent   Dressing independent   Eating independent   IADLs independent   Communication understands/communicates without difficulty   Swallowing swallows foods/liquids without difficulty   Baseline Diet/Method of Nutritional Intake no diet restrictions   Past History of Dysphagia no   Prior Level of Function Comment Per chart review: PMH SB6,  MT2,  No VFSS in EMR.  Pt seen by SLP at [facility].   Assistive Device Currently Used at Home --  [Per chart review pt has RW at home - unclear if pt was using AD and pt unable to state PLOF at this time]           IRF SLP Evaluation and Treatment - 07/07/23 0833        SLP Time Calculation    Start Time 0830     Stop Time 0930     Time Calculation (min) 60 min        Session Details    Document Type Daily Treatment/Progress Note     Mode of Treatment speech language pathology;individual therapy        General Information    General Observations of Patient Pt received in her room, asleep in bed. Handoff from/to  1:1 at beginning and end of session.        Cognition/Psychosocial    Comment, Cognition Pt noted to be impulsive during toileting this morning and resistive to assistance from SLP.        Auditory Comprehension    Auditory Comprehension Intervention comprehension of single words;following directions     Comment, Intervention (Auditory Comprehension) In context of toileting and brushing teeth, pt followed 1 step directions with 75% acc. on initial attempt, improving to 100% given repetition and min visual cues. Began trialing low tech AAC board with pt this date. Given FO6 pictures, pt identified symbols given single words from SLP with 6/6 acc. Given 4x4 symbol board (with written words), pt matched symbols (e.g., TV, pillow, tissues), to objects in room with 14/14 acc. I'ly. Pt indicated she wanted to continue auditory comprehension task. Using Snapverse therapy ted, pt identified pictures in a FO6 given verbal prompt with 25/25 acc. given x2 repetition of prompt as pt became slightly distracted by images of animals in the field.        Verbal Expression    Verbal Expression Intervention word retrieval activities     Comment, Intervention (Verbal Expression) Pt relied on nonverbal communication via gestures to communicate for majority of session despite encouragement for repetition of single words from SLP. Pt utilized gestures to indicates dislike of items on food tray. Pt able to communicate mildly advanced ideas (e.g,, not eating the food because she does not like it, wanting iced tea to be poured into a cup) given mild-mod assistance from SLP.        Food and Liquid Trials (NIS)    Patient Positioning upright in wheelchair     Oral Intake/Feeding Performance set-up of food/liquid needed     Liquid Consistencies Evaluated thin liquids     Thin Liquids patient-controlled amounts;single cup sips     Comment, Thin Liquids iced tea     Food Consistencies Evaluated easy to chew (EC7)     Easy to Chew (EC7) use of  teaspoon/fork     Comment, Easy to Chew (EC7) Vietnamese toast     Oral Preparatory Phase of Swallow mastication, slow but effective     Comment, Oral Phase Pt with minimal intake of solids during meal, no oral residue visualized in oral cavity following bites were approximately 1-2cm in size.     Pharyngeal Phase of Swallow no clinical symptoms     Comment, Pharyngeal Phase No overt s/sx of aspiration with single sips of thins and minimal bites of EC7 Vietnamese toast     Esophageal Phase of Swallow no clinical symptoms     Comment Pt with minimal intake of EC7 Vietnamese toast during breakfast. Prolonged mastication. Pt is now aware of possibility of pocketing and expressed some anxiety regarding pocketing with solid items. Discussed use of lingual sweep and liquid wash to clear residue. No s/sx of aspiration with thin liquids.        Daily Progress Summary (SLP)    Daily Outcome Statement Good participation. Session focus on aud. comprehension, diet tolerance, and verbal expression. Pt demonstrated great use of a 4x4 communication board with 100% acc. identifying symbols. Pt relied on gestures for expressive communication this session. No s/sx of aspiration with thin liquids; minimal intake of EC7 solids with breakfast. ST to continue targeting communication and swallowing goals per POC.                      IRF SLP Goals    Flowsheet Row Most Recent Value   Auditory Comprehension Goal 1    Auditory Comprehension Goal 1 pt will complete basic Aud comprehension tasks (simple yes/no 75%),  1 step commands 50% with max cues,  match word heard to picture f/o 2 50% max cues at 07/02/2023 1001   Time Frame short-term goal (STG), 1 week at 07/02/2023 1001   Progress/Outcome goal ongoing at 07/04/2023 1510   Auditory Comprehension Goal 2    Auditory Comprehension Goal 2 Pt will understand mod-complex level y/n questions 90% min cues,  2 step commands 75% min-mod cues,  at 07/02/2023 1001   Time Frame long-term goal (LTG), 4 weeks at  07/02/2023 1001   Progress/Outcome goal ongoing at 07/04/2023 1510   Verbal Expression Goal 1    Verbal Expression Goal 1 Pt will complete basic VE tasks (name. automatics, simple opposites, naming) with 20% accuracy and max cues. at 07/02/2023 1001   Time Frame short-term goal (STG), 1 week at 07/02/2023 1001   Progress/Outcome goal ongoing at 07/04/2023 1510   Verbal Expression Goal 2    Verbal Expression Goal 2 Pt will complete Verbal expression tasks (basic-mod) with 50-75% accuracy and min-mod cues.  Pt will imitate vowels 50% max cues,  at 07/02/2023 1001   Time Frame long-term goal (LTG), 4 weeks at 07/02/2023 1001   Progress/Outcome goal ongoing at 07/04/2023 1510   Motor Speech/Voice Goal 1    Motor Speech/Voice Goal 1 Repetition of vowels, CV/VC and CVC targets with max cues and models for 60% acc. at 07/04/2023 1510   Time Frame short-term goal (STG), 2 weeks at 07/04/2023 1510   Progress/Outcome goal ongoing at 07/04/2023 1510   Motor Speech/Voice Goal 2    Motor Speech/Voice Goal 2 Repetition of vowels, CV/VC and CVC targets with min cues and models for 90% acc. at 07/04/2023 1510   Time Frame long-term goal (LTG), 4 weeks at 07/04/2023 1510   Progress/Outcome goal ongoing at 07/04/2023 1510   Reading Comprehension Goal 1    Reading Comprehension Goal 1 Pt will match word to picture field of 2 with 50% accuracy and mod-max cues,  Pt will read single functional words with 20% accuracy max cues at 07/02/2023 1001   Time Frame short-term goal (STG), 1 week at 07/02/2023 1001   Progress/Outcome goal no longer appropriate at 07/04/2023 1510   Reading Comprehension Goal 2    Reading Comprehension Goal 2 Pt will read simple sentences and comprehend simple sentences with 75% accuracy min-mod cues at 07/02/2023 1001   Time Frame long-term goal (LTG) at 07/02/2023 1001   Progress/Outcome goal no longer appropriate at 07/04/2023 1510   Written Expression Goal 1    Written Expression Goal 1 Pt will copy first name,  shapes, letters and 3-4 letter words with 80% min cues,  pt will copy letters, name shapes with 50% accuracy and mod cues. at 07/02/2023 1001   Time Frame short-term goal (STG), 1 week at 07/02/2023 1001   Progress/Outcome goal no longer appropriate at 07/04/2023 1510   Written Expression Goal 2    Written Expression Goal 2 Pt will write first and last name,  single words 90% accuracy at 07/02/2023 1001   Progress/Outcome goal no longer appropriate at 07/04/2023 1510   Oral Nutrition/Hydration Goal 1    Activity effective/safe, use of swallowing techniques, management of texture/viscosity, other (see comments)  [SB6,  MT2.  Trials of thins and various textures by speech with min s/s of aspiration] at 07/02/2023 1001   Time Frame short-term goal (STG), 14 days or less at 07/02/2023 1001   Progress/Outcome goal ongoing at 07/04/2023 1510   Oral Nutrition/Hydration Goal 2    Activity effective/safe, oral nutrition/hydration, use of swallowing techniques, management of texture/viscosity, other (see comments)  [regular and thins] at 07/02/2023 1001   Time Frame long-term goal (LTG), 4 weeks at 07/02/2023 1001   Progress/Outcome goal ongoing at 07/04/2023 1510   Oral Motor Exercise Goal 1    Activity perform oral motor strengthening exercises, facial/cheek, lip, tongue, strength/ROM, mastication/bolus manipulation, 5-10 times per session, other (see comments)  [use mirror/visual model] at 07/02/2023 1001   [place] with 1:1 supervision/constant cues at 07/02/2023 1001   Time Frame short-term goal (STG), 14 days or less at 07/02/2023 1001   Progress/Outcome goal ongoing at 07/04/2023 1510   Oral Motor Exercise Goal 2    Activity perform oral motor strengthening exercises, facial/cheek, lip, tongue, strength/ROM, mastication/bolus manipulation, other (see comments)  [2-3 times a day sets of 10-15] at 07/02/2023 1001   [place] with minimal cues (75-90% accuracy) at 07/02/2023 1001   Time Frame long-term goal (LTG),  4 weeks at 07/02/2023 1001   Progress/Outcome goal ongoing at 07/04/2023 9995

## 2023-07-07 NOTE — PLAN OF CARE
"  Problem: Oral Intake Inadequate  Goal: Improved Oral Intake  Intervention: Promote and Optimize Oral Intake  Flowsheets (Taken 7/7/2023 1330)  Oral Nutrition Promotion: other (see comments)   Nutrition Interventions/ Recommendations:   1. Diet level per ST, (EC7, thin)  2.Chatted MD to discontinue \"cardiac\" diet restriction as well as consider calorie count.     3. Some Food preferences noted from discussion with friend, Thelma   4. Will change oral supplement and check acceptance (carnation inst breakfast, van fresh milk shake BID)  5. Monitor PO, weight, labs, skin  "

## 2023-07-07 NOTE — PROGRESS NOTES
Physical Medicine and Rehabilitation Progress Note          Patient was seen and examined.   Attestation Notes: Face to face encounter completed    Subjective     No acute events overnight.  Patient seen and examined this morning in room.  History limited by cog/communication impairment.      Review of Systems:  Negative except as per HPI    Objective     Vital signs in last 24 hours:  Heart Rate:  [69-89] 89  BP: (126-139)/(58-77) 139/77    Physical Exam      General Appearance:        Alert, cooperative, no distress   Head:    Normocephalic,atraumatic   Eyes:    Conjunctiva clear      Lungs:     Clear to auscultation bilaterally, respirations unlabored   Heart:    Regular rate and rhythm, no murmur   Abdomen:     Soft, non-tender, bowel sounds active   Extremities:     Extremities normal, atraumatic, no cyanosis or edema           Skin:   No rashes or lesions   Neurologic:          Behavior/  Emotional:   Awake, alert, aphasic, verbal output limited to incoherent sounds.  She is able to follow simple commands with increased time.  0/5 with wrist extension, , finger abduction on right.      Appropriate, cooperative                  Results from last 7 days   Lab Units 07/02/23  0603 07/01/23  0357   WBC K/uL 4.83 4.74   RBC M/uL 3.76* 3.62*   HEMOGLOBIN g/dL 11.4* 11.2*   HEMATOCRIT % 34.6* 33.4*   PLATELETS K/uL 140* 125*   MCV fL 92.0 92.3   RDW % 12.8 12.7       Results from last 7 days   Lab Units 07/07/23  1205 07/06/23  1453 07/04/23  0519 07/03/23  0542 07/02/23  0603   SODIUM mEQ/L 139 141 141 140 141   POTASSIUM mEQ/L 4.4 4.5 3.2* 3.2* 3.0*   CHLORIDE mEQ/L 104 107 107 108* 109*   CO2 mEQ/L 26 28 26 25 24   BUN mg/dL 9 7 7 6* 6*   CREATININE mg/dL 0.7 0.8 0.6 0.6 0.6   EGFR mL/min/1.73m*2 >60.0 >60.0 >60.0 >60.0 >60.0   ANION GAP mEQ/L 9 6 8 7 8       Current Facility-Administered Medications   Medication Dose Route Frequency   • acetaminophen  650 mg oral q6h PRN   • apixaban  5 mg oral BID   •  cycloSPORINE  1 drop Both Eyes q12h ROBERTO   • lansoprazole  30 mg oral Daily before breakfast   • [Provider Managed Hold] magnesium oxide  400 mg oral BID   • melatonin  3 mg oral Nightly   • [Provider Managed Hold] potassium chloride  40 mEq oral TID   • rosuvastatin  20 mg oral Nightly   • senna  1 tablet oral 2x daily PRN   • trazodone  25 mg oral Nightly       Plan of care was discussed with patient                    * Acute ischemic left MCA stroke (CMS/HCC)  Assessment & Plan  The patient is a 78-year-old  female with a history of AAA, heart block s/p cardiac pacemaker, glaucoma, hyperlipidemia, coronary artery disease, NSTEMI, who presented to [facility] on June 26 after she has not been seen by her friends for a few days.   In the emergency department she was aphasic with right-sided weakness and hypertensive to the 190 systolic.  CT scan of the head revealed an acute infarct in left MCA with mild bleeding.  Echo showed an EF of 43% and a bubble study was negative.  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  Later this was transitioned to apixaban.        -Full program PT/OT/SLP/rehabilitation RN/psychology. Consult internal medicine for comorbidities.  -Continue apixaban  -Continue statin  -Sleep - melatonin and trazodone  -Safety - one-to-one continuous observation, low bed, lapbelt.  Wean restraints as able    Disposition -anticipate patient will need 24/7 supervision for safe discharge          Right wrist drop  Assessment & Plan  Splinting, contracture prevention    Left ventricular apical thrombus  Assessment & Plan  Continue apixaban    Coronary artery disease involving native heart without angina pectoris  Assessment & Plan  Continue apixaban    Glaucoma  Assessment & Plan  Continue eye drops    Follow-up exam  Assessment & Plan  Dispo: 7/14 will need at [facility] supervision at time of DC from  IRF      PCP  Cardiology Dr. Lorenzo for her cardiac function and anticoagulation treatment  Neurology

## 2023-07-07 NOTE — PLAN OF CARE
Plan of Care Review  Plan of Care Reviewed With: patient  Progress: improving  Outcome Evaluation: Refused am lab draw.

## 2023-07-07 NOTE — PROGRESS NOTES
Patient: Jennifer Sams  Location: [facility] [address]  MRN: [id number]  Today's date: 7/7/2023    History of Present Illness  Jennifer is a 78 y.o. female admitted on 7/1/2023 with Acute ischemic left MCA stroke (CMS/HCC) [I63.512]. Principal problem is Acute ischemic left MCA stroke (CMS/HCC).    The patient is a 78-year-old  female with a history of AAA, heart block s/p cardiac pacemaker, glaucoma, hyperlipidemia, coronary artery disease, NSTEMI, who presented to [facility] on June 26 after she has not been seen by her friends for a few days.  Patient lives alone and when she was found by EMS she was confused and combative.  In the emergency department she was aphasic with right-sided weakness and hypertensive to the 190 systolic.  CT scan of the head revealed an acute infarct in left MCA with mild bleeding.  Echo showed an EF of 43% and a bubble study was negative.  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  Later this was transitioned to apixaban.  She was continued on statin for secondary prevention.  She was evaluated by speech therapy and cleared for a soft and bite-size diet with moderately thick liquids.    7/2/2023:  Diet SB6 with mildly thick liquids (MT2)  7/6/2023: Upgraded to EC7 with thin liquids    Past Medical History  Jennifer has a past medical history of AAA (abdominal aortic aneurysm), Arthritis, Elevated blood pressure reading without diagnosis of hypertension (4/19/2019), Epistaxis (1/6/2020), GERD (gastroesophageal reflux disease) (4/19/2019), Glaucoma (4/19/2019), Heart murmur, Hiatal hernia, History of hip replacement, total, right (4/19/2019), History of rheumatic fever as a child (4/19/2019), Hypercholesterolemia (4/19/2019), Ischemic cardiomyopathy (5/9/2019), Kidney cysts, Osteoarthritis of multiple joints (4/19/2019), Osteoporosis, Pacemaker (12/9/2019),  Raynaud's disease (4/19/2019), RSD (reflex sympathetic dystrophy) (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        OT Vitals    Date/Time Pulse HR Source BP BP Location BP Method Pt Position [facility]   07/07/23 1115 72 Monitor 126/58 Left upper arm Automatic Lying [place]      OT Pain    Date/Time Pain Type Rating: Rest Rating: Activity [facility]   07/07/23 1115 Pain Assessment 0 - no pain -- [place]   07/07/23 1155 Pain Reassessment -- 0 - no pain [place]          Prior Living Environment    Flowsheet Row Most Recent Value   People in Home alone   Current Living Arrangements home   Home Accessibility --  [2 SH with 1 DARÍO]   Living Environment Comment Per chart review, pt lives alone in a 2SH with 1STE   Number of Stairs, Main Entrance 1   Number of Stairs, Within Home, Primary 12  [Full fight to second floor]          Prior Level of Function    Flowsheet Row Most Recent Value   Dominant Hand --  [Unable to determine 2/2 cognitive/communication deficits]   Ambulation independent   Transferring independent   Toileting independent   Bathing independent   Dressing independent   Eating independent   IADLs independent   Communication understands/communicates without difficulty   Swallowing swallows foods/liquids without difficulty   Baseline Diet/Method of Nutritional Intake no diet restrictions   Past History of Dysphagia no   Prior Level of Function Comment Per chart review: PMH SB6,  MT2,  No VFSS in EMR.  Pt seen by SLP at [place].   Assistive Device Currently Used at Home --  [Per chart review pt has RW at home - unclear if pt was using AD and pt unable to state PLOF at this time]          Occupational Profile    Flowsheet Row Most Recent Value   Occupational History/Life Experiences Will need to clarify PLOF/driving status. Pt unable to state 2/2 cognitive/communication deficits.   Environmental Supports and Barriers Per chart review, pt lives alone but has  supportive friends.   Patient Goals Pt unable to state goal 2/2 communication deficits.           IRF OT Evaluation and Treatment - 07/07/23 1114        OT Time Calculation    Start Time 1100     Stop Time 1200     Time Calculation (min) 60 min        Session Details    Document Type Daily Treatment/Progress Note     Mode of Treatment occupational therapy;individual therapy        General Information    General Observations of Patient pleasant and cooperative, rec'd in bed reporting upset stomach. Agreed to session at bed level to remain clost to bathroom.        Mobility Belt    Mobility Belt Used for All Out of Bed Activity no     Reason Mobility Belt Not Used patient refused     Reason Mobility Belt Not Used Pt choosing to remain in bed for duration of session d/t feelings of nausea.        Wrist/Hand Orthosis Management    Type (Wrist/Hand Orthosis) right     Wearing Schedule (Wrist/Hand Orthosis) --   R cockup splint donned throughout session, removed for gentle ROM only       Cognition/Psychosocial    Comment, Cognition Pt expressing self via hand gestures and yes/no verbilizations. Improved carryover with therapist asking yes/no questions. Pt reports nausea this day and declines therapy in any location outside of room to remain close to bathroom.     Cognitive Interventions/Strategies reasoning/problem-solving interventions     Comment, Cognitive Interventions 1) pt initially expressing excitement to participate in puzzle matching of animals with printed puzzle layered underneath however pt declines participation after matching 4-5 pieces independently in extended time. Therapist down grading task to use only limited pieces at boarder of puzzle only. Pt matches an additional x2 pieces with min VC, then self terminating task. 2) Pt engaged in destination card sorting task making x3 piles for places she has visited, places she would like to visit and places she would not care to visit. Pt initially sorting  based on 3 piles and signals places she has been to by firt hugging the picture card. Pt later begins making multiples of piles ending up with x5 undetermined piles then pt combining to make x2 piles towards end of act. Pt able to express additional details about trip via hand gestures however builds with frustration quickly when therapist unable to determine what pt is attempting to communicate.        Upper Extremity (Therapeutic Exercise)    Exercise Position/Type seated;resistive exercises     General Exercise left     Range of Motion Exercises elbow flexion/extension;shoulder external/internal rotation;shoulder flexion/extension     Reps and Sets 2x15     Comment Using pink theraband pt completes lat pulls and unilateral chest press with therapist holding opposite end of band. unable to grasp at R hand        Hand (Therapeutic Exercise)    Exercise Position/Type seated;PROM (passive range of motion)     General Exercise right     Reps and Sets 2x10     Comment seated at EOB therapist completes gentle stretch at R wrist with R cockup splint removed during stretch and re-donned following        Daily Progress Summary (OT)    Daily Outcome Statement Pt tolerates session seated at EOB, declining any OOB therapy this day d/t nausea. Pt expresses concerns at start of session wanting to be close to the bathroom followed by gestural cues at stomach and vomiting. Pt engaged in seated table top tasks followed by BUE strengthening/stretching. Handoff to 1:1 pre and post therapy session. Cont OT POC                           IRF OT Goals    Flowsheet Row Most Recent Value   Transfer Goal 1    Activity/Assistive Device toilet at 07/02/2023 0711   [place] --  [Cl S] at 07/02/2023 0711   Time Frame short-term goal (STG), 5 - 7 days at 07/05/2023 0702   Progress/Outcome goal ongoing, goal revised this date at 07/05/2023 0702   Transfer Goal 2    Activity/Assistive Device toilet at 07/02/2023 0711   [place]  supervision required at 07/02/2023 0711   Time Frame long-term goal (LTG), 14 days or less at 07/02/2023 0711   Progress/Outcome goal ongoing at 07/05/2023 0702   Transfer Goal 3    Activity/Assistive Device shower at 07/02/2023 0711   [place] --  [Cl S] at 07/02/2023 0711   Time Frame short-term goal (STG), 5 - 7 days at 07/02/2023 0711   Progress/Outcome goal ongoing, goal revised this date at 07/05/2023 0702   Transfer Goal 4    Activity/Assistive Device shower at 07/02/2023 0711   [place] supervision required at 07/02/2023 0711   Time Frame long-term goal (LTG), 14 days or less at 07/02/2023 0711   Progress/Outcome goal ongoing at 07/05/2023 0702   Bathing Goal 1    [place] --  [Steadying A] at 07/02/2023 0711   Time Frame short-term goal (STG), 5 - 7 days at 07/05/2023 0702   Progress/Outcome goal ongoing, goal revised this date at 07/05/2023 0702   Bathing Goal 2    [place] supervision required at 07/02/2023 0711   Time Frame long-term goal (LTG), 14 days or less at 07/02/2023 0711   Progress/Outcome goal ongoing at 07/05/2023 0702   UB Dressing Goal 1    [place] --  [Cl S] at 07/05/2023 0702   Time Frame short-term goal (STG), 5 - 7 days at 07/05/2023 0702   UB Dressing Goal 2    [place] set-up required at 07/02/2023 0711   Time Frame long-term goal (LTG), 14 days or less at 07/02/2023 0711   Progress/Outcome goal ongoing at 07/05/2023 0702   LB Dressing Goal 1    [place] --  [Cl S] at 07/05/2023 0702   Time Frame short-term goal (STG), 5 - 7 days at 07/05/2023 0702   LB Dressing Goal 2    [place] supervision required at 07/02/2023 0711   Time Frame long-term goal (LTG), 14 days or less at 07/02/2023 0711   Progress/Outcome goal ongoing at 07/05/2023 0702   Grooming Goal 1    [place] --  [Steadying A] at 07/05/2023 0702   Time Frame short-term goal (STG), 5 - 7 days at 07/05/2023 0702   Progress/Outcome goal revised this date at 07/05/2023 0702   Grooming Goal 2     [place] set-up required at 07/02/2023 0711   Time Frame long-term goal (LTG), 14 days or less at 07/02/2023 0711   Progress/Outcome goal ongoing at 07/05/2023 0702   Toileting Goal 1    [place] --  [Cl S] at 07/05/2023 0702   Time Frame short-term goal (STG), 5 - 7 days at 07/05/2023 0702   Progress/Outcome goal met, goal revised this date at 07/05/2023 0702   Toileting Goal 2    [place] supervision required at 07/02/2023 0711   Time Frame long-term goal (LTG), 14 days or less at 07/02/2023 0711   Progress/Outcome goal ongoing at 07/05/2023 0702

## 2023-07-07 NOTE — PROGRESS NOTES
Patient: Jennifer Sams  Location: [facility] [address]  MRN: [id number]  Today's date: 7/7/2023    History of Present Illness  Jennifer is a 78 y.o. female admitted on 7/1/2023 with Acute ischemic left MCA stroke (CMS/HCC) [I63.512]. Principal problem is Acute ischemic left MCA stroke (CMS/HCC).    The patient is a 78-year-old  female with a history of AAA, heart block s/p cardiac pacemaker, glaucoma, hyperlipidemia, coronary artery disease, NSTEMI, who presented to [facility] on June 26 after she has not been seen by her friends for a few days.  Patient lives alone and when she was found by EMS she was confused and combative.  In the emergency department she was aphasic with right-sided weakness and hypertensive to the 190 systolic.  CT scan of the head revealed an acute infarct in left MCA with mild bleeding.  Echo showed an EF of 43% and a bubble study was negative.  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  Later this was transitioned to apixaban.  She was continued on statin for secondary prevention.  She was evaluated by speech therapy and cleared for a soft and bite-size diet with moderately thick liquids.    7/2/2023:  Diet SB6 with mildly thick liquids (MT2)  7/6/2023: Upgraded to EC7 with thin liquids    Past Medical History  Jennifer has a past medical history of AAA (abdominal aortic aneurysm), Arthritis, Elevated blood pressure reading without diagnosis of hypertension (4/19/2019), Epistaxis (1/6/2020), GERD (gastroesophageal reflux disease) (4/19/2019), Glaucoma (4/19/2019), Heart murmur, Hiatal hernia, History of hip replacement, total, right (4/19/2019), History of rheumatic fever as a child (4/19/2019), Hypercholesterolemia (4/19/2019), Ischemic cardiomyopathy (5/9/2019), Kidney cysts, Osteoarthritis of multiple joints (4/19/2019), Osteoporosis, Pacemaker (12/9/2019),  Raynaud's disease (4/19/2019), RSD (reflex sympathetic dystrophy) (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        PT Vitals    Date/Time Pulse BP BP Location BP Method Pt Position [facility]   07/07/23 1429 82 130/64 Right upper arm Automatic Sitting LBR   07/07/23 1446 89 139/77 Right upper arm Automatic Sitting LBR      PT Pain    Date/Time Pain Type Rating: Rest Rating: Activity [facility]   07/07/23 1429 Pain Assessment 0 - no pain 0 - no pain LBR   07/07/23 1455 Post Activity 0 - no pain 0 - no pain LBR          Prior Living Environment    Flowsheet Row Most Recent Value   People in Home alone   Current Living Arrangements home   Home Accessibility --  [2 SH with 1 DARÍO]   Living Environment Comment Per chart review, pt lives alone in a 2SH with 1STE   Number of Stairs, Main Entrance 1   Number of Stairs, Within Home, Primary 12  [Full fight to second floor]          Prior Level of Function    Flowsheet Row Most Recent Value   Dominant Hand --  [Unable to determine 2/2 cognitive/communication deficits]   Ambulation independent   Transferring independent   Toileting independent   Bathing independent   Dressing independent   Eating independent   IADLs independent   Communication understands/communicates without difficulty   Swallowing swallows foods/liquids without difficulty   Baseline Diet/Method of Nutritional Intake no diet restrictions   Past History of Dysphagia no   Prior Level of Function Comment Per chart review: PMH SB6,  MT2,  No VFSS in EMR.  Pt seen by SLP at [facility].   Assistive Device Currently Used at Home --  [Per chart review pt has RW at home - unclear if pt was using AD and pt unable to state PLOF at this time]           IRF PT Evaluation and Treatment - 07/07/23 1429        PT Time Calculation    Start Time 1400     Stop Time 1500     Time Calculation (min) 60 min        Session Details    Document Type Daily  Treatment/Progress Note     Mode of Treatment physical therapy;individual therapy        Mobility Belt    Mobility Belt Used for All Out of Bed Activity yes        Bed Mobility    [place], Supine to Sit supervision     [place], Sit to Supine supervision        Sit to Stand Transfer    [place], Sit to Stand Transfer close supervision     Safety/Cues impulsivity     Assistive Device none     Comment from bed, mat        Stand to Sit Transfer    [place], Stand to Sit Transfer close supervision     Safety/Cues impulsivity     Assistive Device none     Comment to bed, mat        Stand Pivot Transfer    [place], Stand Pivot/Stand Step Transfer close supervision     Safety/Cues impulsivity     Assistive Device none     Comment amb approach        Toilet Transfer    Transfer Technique stand pivot     [place] close supervision     Safety/Cues impulsivity     Assistive Device grab bars/safety frame     Comment amb approach        Gait Training    [place], Gait close supervision     Safety/Cues impulsivity     Assistive Device none     Distance in Feet 300 feet     Pattern step-through     Deviations/Abnormal Patterns base of support, narrow;gait speed decreased;step length decreased;weight shifting decreased     Bilateral Gait Deviations heel strike decreased     Advanced Gait Activity step over obstacle     [place], Picking Up Object touching/steadying assist   picking hurdles off the floor with steadying assist for balance/safety    Comment (Gait/Stairs) 3 gait trials as noted above: >=300'x3 with cl S for safety (ambulating around the 3rd floor)        Curb Negotiation    [place] touching/steadying assist     Assistive Device none     Curb Height 6 inches     Comment assist for balance        Sloped Surface Gait Skills    [place] touching/steadying assist     Assistive Device none     Distance in Feet 5 feet     Comment indoor ramp; assist for balance        Step Over  Obstacle    [place] touching/steadying assist     Assistive Device none     Comment forward fabiola negotiation: 6+6 hurdles performed 2x via a reciprical stepping pattern with steadying assist for balance.        Stairs Training    [place], Stairs close supervision     Safety/Cues impulsivity     Assistive Device railing     Handrail Location (Stairs) left side (ascending);left side (descending)     Number of Stairs 24     Stair Height 7 inches     Ascending Stairs Technique step-over-step     Descending Stairs Technique step-over-step     Comment min cueing for safety        Balance    Static Standing Balance mild impairment;unsupported     Dynamic Standing Balance mild impairment;unsupported        Daily Progress Summary (PT)    Daily Outcome Statement Pt received in bed, agreeable to session with min cueing re: POC and importance of mobility training.  Pt is able to make her needs known via yes/no's and gesturing.  She can be perservative and hard to redirect at times, but was cooperative with mobility training as noted above.  Pt directly handed off to 1:1 at end of session.                           IRF PT Goals    Flowsheet Row Most Recent Value   Bed Mobility Goal 1    Activity/Assistive Device bed mobility activities, all at 07/02/2023 0830   [place] supervision required at 07/02/2023 0830   Time Frame 1 week, short-term goal (STG) at 07/05/2023 0835   Strategies/Barriers not reassessed at 07/05/2023 0835   Progress/Outcome goal ongoing at 07/05/2023 0835   Bed Mobility Goal 2    Activity/Assistive Device bed mobility activities, all at 07/02/2023 0830   [place] modified independence at 07/02/2023 0830   Time Frame 2 weeks, long-term goal (LTG) at 07/05/2023 0835   Progress/Outcome goal ongoing at 07/05/2023 0835   Transfer Goal 1    Activity/Assistive Device sit-to-stand/stand-to-sit, bed-to-chair/chair-to-bed, stand pivot at 07/02/2023 0830   [place] supervision required at  07/02/2023 0830   Time Frame 1 week, short-term goal (STG) at 07/05/2023 0835   Strategies/Barriers steadying assist for safety at 07/05/2023 0835   Progress/Outcome goal ongoing at 07/05/2023 0835   Transfer Goal 2    Activity/Assistive Device sit-to-stand/stand-to-sit, bed-to-chair/chair-to-bed, stand pivot at 07/02/2023 0830   [place] modified independence at 07/02/2023 0830   Time Frame 2 weeks, long-term goal (LTG) at 07/05/2023 0835   Progress/Outcome goal ongoing at 07/05/2023 0835   Gait/Walking Locomotion Goal 1    Activity/Assistive Device gait (walking locomotion) at 07/02/2023 0830   Distance 250 feet at 07/02/2023 0830   [place] supervision required at 07/02/2023 0830   Time Frame 1 week, short-term goal (STG) at 07/05/2023 0835   Strategies/Barriers touching assist at 07/05/2023 0835   Progress/Outcome goal ongoing at 07/05/2023 0835   Gait/Walking Locomotion Goal 2    Activity/Assistive Device gait (walking locomotion) at 07/02/2023 0830   Distance 500 feet at 07/02/2023 0830   [place] supervision required at 07/02/2023 0830   Time Frame 2 weeks, long-term goal (LTG) at 07/05/2023 0835   Progress/Outcome goal ongoing at 07/05/2023 0835   Wheelchair Locomotion Goal 1    Activity wheelchair mobility skills, all at 07/02/2023 0830   Assistive Device manual, lightweight at 07/02/2023 0830   Distance 50 feet at 07/02/2023 0830   [place] minimum assist (75% or more patient effort) at 07/02/2023 0830   Time Frame 1 week, short-term goal (STG) at 07/05/2023 0835   Strategies/Barriers lower w/c ordered at 07/05/2023 0835   Progress/Outcome goal no longer appropriate at 07/05/2023 0835   Wheelchair Locomotion Goal 2    Activity wheelchair mobility skills, all at 07/02/2023 0830   Assistive Device manual, lightweight at 07/02/2023 0830   Distance 200 feet at 07/05/2023 0835   [place] modified independence at 07/05/2023 0835   Time Frame 2 weeks, long-term goal (LTG) at 07/05/2023 0835    Progress/Outcome goal revised this date at 07/05/2023 0835   Stairs Goal 1    Activity/Assistive Device stairs, all skills at 07/02/2023 0830   Number of Stairs 16 at 07/02/2023 0830   [place] supervision required at 07/02/2023 0830   Time Frame 1 week, short-term goal (STG) at 07/05/2023 0835   Strategies/Barriers touching assist at 07/05/2023 0835   Progress/Outcome goal ongoing at 07/05/2023 0835   Stairs Goal 2    Activity/Assistive Device stairs, all skills at 07/02/2023 0830   Number of Stairs 24 at 07/02/2023 0830   [place] supervision required at 07/02/2023 0830   Time Frame 2 weeks, long-term goal (LTG) at 07/05/2023 0835   Progress/Outcome goal ongoing at 07/05/2023 0835

## 2023-07-07 NOTE — PROGRESS NOTES
Patient: Jennifer Sams  Location: [facility] [address]  MRN: [id number]  Today's date: 7/7/2023    History of Present Illness  Jennifer is a 78 y.o. female admitted on 7/1/2023 with Acute ischemic left MCA stroke (CMS/HCC) [I63.512]. Principal problem is Acute ischemic left MCA stroke (CMS/HCC).    The patient is a 78-year-old  female with a history of AAA, heart block s/p cardiac pacemaker, glaucoma, hyperlipidemia, coronary artery disease, NSTEMI, who presented to [facility] on June 26 after she has not been seen by her friends for a few days.  Patient lives alone and when she was found by EMS she was confused and combative.  In the emergency department she was aphasic with right-sided weakness and hypertensive to the 190 systolic.  CT scan of the head revealed an acute infarct in left MCA with mild bleeding.  Echo showed an EF of 43% and a bubble study was negative.  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  Later this was transitioned to apixaban.  She was continued on statin for secondary prevention.  She was evaluated by speech therapy and cleared for a soft and bite-size diet with moderately thick liquids.    7/2/2023:  Diet SB6 with mildly thick liquids (MT2)  7/6/2023: Upgraded to EC7 with thin liquids    Past Medical History  Jennifer has a past medical history of AAA (abdominal aortic aneurysm), Arthritis, Elevated blood pressure reading without diagnosis of hypertension (4/19/2019), Epistaxis (1/6/2020), GERD (gastroesophageal reflux disease) (4/19/2019), Glaucoma (4/19/2019), Heart murmur, Hiatal hernia, History of hip replacement, total, right (4/19/2019), History of rheumatic fever as a child (4/19/2019), Hypercholesterolemia (4/19/2019), Ischemic cardiomyopathy (5/9/2019), Kidney cysts, Osteoarthritis of multiple joints (4/19/2019), Osteoporosis, Pacemaker (12/9/2019),  Raynaud's disease (4/19/2019), RSD (reflex sympathetic dystrophy) (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        PT Vitals    Date/Time Pulse BP BP Location BP Method Pt Position [facility]   07/07/23 1209 69 130/60 Right upper arm Automatic Sitting LBR      PT Pain    Date/Time Pain Type Rating: Rest Rating: Activity [facility]   07/07/23 1209 Pain Assessment 0 - no pain 0 - no pain LBR   07/07/23 1229 Post Activity 0 - no pain 0 - no pain LBR          Prior Living Environment    Flowsheet Row Most Recent Value   People in Home alone   Current Living Arrangements home   Home Accessibility --  [2 SH with 1 DARÍO]   Living Environment Comment Per chart review, pt lives alone in a 2SH with 1STE   Number of Stairs, Main Entrance 1   Number of Stairs, Within Home, Primary 12  [Full fight to second floor]          Prior Level of Function    Flowsheet Row Most Recent Value   Dominant Hand --  [Unable to determine 2/2 cognitive/communication deficits]   Ambulation independent   Transferring independent   Toileting independent   Bathing independent   Dressing independent   Eating independent   IADLs independent   Communication understands/communicates without difficulty   Swallowing swallows foods/liquids without difficulty   Baseline Diet/Method of Nutritional Intake no diet restrictions   Past History of Dysphagia no   Prior Level of Function Comment Per chart review: PMH SB6,  MT2,  No VFSS in EMR.  Pt seen by SLP at [place].   Assistive Device Currently Used at Home --  [Per chart review pt has RW at home - unclear if pt was using AD and pt unable to state PLOF at this time]           IRF PT Evaluation and Treatment - 07/07/23 1210        PT Time Calculation    Start Time 1200     Stop Time 1230     Time Calculation (min) 30 min        Session Details    Document Type Daily Treatment/Progress Note     Mode of Treatment physical therapy;individual  therapy        Mobility Belt    Mobility Belt Used for All Out of Bed Activity no     Reason Mobility Belt Not Used patient refused        Bed Mobility    [place], Supine to Sit supervision     [place], Sit to Supine supervision        Sit to Stand Transfer    [place], Sit to Stand Transfer close supervision     Safety/Cues impulsivity     Assistive Device none     Comment from bed        Stand to Sit Transfer    [place], Stand to Sit Transfer close supervision     Safety/Cues impulsivity     Assistive Device none     Comment to bed        Stand Pivot Transfer    [place], Stand Pivot/Stand Step Transfer close supervision     Safety/Cues impulsivity     Assistive Device none     Comment amb approach        Gait Training    [place], Gait close supervision     Safety/Cues impulsivity     Assistive Device none     Distance in Feet 300 feet     Pattern step-through     Deviations/Abnormal Patterns base of support, narrow;gait speed decreased;step length decreased     Bilateral Gait Deviations heel strike decreased     Comment (Gait/Stairs) 1 gait trial as noted above: 300'x1 with cl S for balance/safety.  Pt frequently likes to link arms while ambulating.        Lower Extremity (Therapeutic Exercise)    Exercise Position/Type standing     General Exercise bilateral;marching while standing;heel raises;hip aBduction;partial squats     Reps and Sets x 10 reps B     Comment steadying assist for balance without UE support        Daily Progress Summary (PT)    Daily Outcome Statement Pt received in bed, initially refusing session (indicating stomach discomfort and fatigue).  With max encouragement, pt agreeable to session as above.  Pt directly handed off to 1:1 at end of session.                           IRF PT Goals    Flowsheet Row Most Recent Value   Bed Mobility Goal 1    Activity/Assistive Device bed mobility activities, all at 07/02/2023 0830   [place] supervision required at  07/02/2023 0830   Time Frame 1 week, short-term goal (STG) at 07/05/2023 0835   Strategies/Barriers not reassessed at 07/05/2023 0835   Progress/Outcome goal ongoing at 07/05/2023 0835   Bed Mobility Goal 2    Activity/Assistive Device bed mobility activities, all at 07/02/2023 0830   [place] modified independence at 07/02/2023 0830   Time Frame 2 weeks, long-term goal (LTG) at 07/05/2023 0835   Progress/Outcome goal ongoing at 07/05/2023 0835   Transfer Goal 1    Activity/Assistive Device sit-to-stand/stand-to-sit, bed-to-chair/chair-to-bed, stand pivot at 07/02/2023 0830   [place] supervision required at 07/02/2023 0830   Time Frame 1 week, short-term goal (STG) at 07/05/2023 0835   Strategies/Barriers steadying assist for safety at 07/05/2023 0835   Progress/Outcome goal ongoing at 07/05/2023 0835   Transfer Goal 2    Activity/Assistive Device sit-to-stand/stand-to-sit, bed-to-chair/chair-to-bed, stand pivot at 07/02/2023 0830   [place] modified independence at 07/02/2023 0830   Time Frame 2 weeks, long-term goal (LTG) at 07/05/2023 0835   Progress/Outcome goal ongoing at 07/05/2023 0835   Gait/Walking Locomotion Goal 1    Activity/Assistive Device gait (walking locomotion) at 07/02/2023 0830   Distance 250 feet at 07/02/2023 0830   [place] supervision required at 07/02/2023 0830   Time Frame 1 week, short-term goal (STG) at 07/05/2023 0835   Strategies/Barriers touching assist at 07/05/2023 0835   Progress/Outcome goal ongoing at 07/05/2023 0835   Gait/Walking Locomotion Goal 2    Activity/Assistive Device gait (walking locomotion) at 07/02/2023 0830   Distance 500 feet at 07/02/2023 0830   [place] supervision required at 07/02/2023 0830   Time Frame 2 weeks, long-term goal (LTG) at 07/05/2023 0835   Progress/Outcome goal ongoing at 07/05/2023 0835   Wheelchair Locomotion Goal 1    Activity wheelchair mobility skills, all at 07/02/2023 0830   Assistive Device manual, lightweight at  07/02/2023 0830   Distance 50 feet at 07/02/2023 0830   [place] minimum assist (75% or more patient effort) at 07/02/2023 0830   Time Frame 1 week, short-term goal (STG) at 07/05/2023 0835   Strategies/Barriers lower w/c ordered at 07/05/2023 0835   Progress/Outcome goal no longer appropriate at 07/05/2023 0835   Wheelchair Locomotion Goal 2    Activity wheelchair mobility skills, all at 07/02/2023 0830   Assistive Device manual, lightweight at 07/02/2023 0830   Distance 200 feet at 07/05/2023 0835   [place] modified independence at 07/05/2023 0835   Time Frame 2 weeks, long-term goal (LTG) at 07/05/2023 0835   Progress/Outcome goal revised this date at 07/05/2023 0835   Stairs Goal 1    Activity/Assistive Device stairs, all skills at 07/02/2023 0830   Number of Stairs 16 at 07/02/2023 0830   [place] supervision required at 07/02/2023 0830   Time Frame 1 week, short-term goal (STG) at 07/05/2023 0835   Strategies/Barriers touching assist at 07/05/2023 0835   Progress/Outcome goal ongoing at 07/05/2023 0835   Stairs Goal 2    Activity/Assistive Device stairs, all skills at 07/02/2023 0830   Number of Stairs 24 at 07/02/2023 0830   [place] supervision required at 07/02/2023 0830   Time Frame 2 weeks, long-term goal (LTG) at 07/05/2023 0835   Progress/Outcome goal ongoing at 07/05/2023 0835

## 2023-07-07 NOTE — PLAN OF CARE
Plan of Care Review  Plan of Care Reviewed With: patient  Progress: improving  Outcome Evaluation: 7/6 2100 Took all evening meds after much encouragement,taking sips of water infrequently,one to one at bedside,pt impulsive and gets easily frustrated when unable to make needs known,support  provided ,side rails up and call bell in reach. 0100 asleep on rounds.

## 2023-07-07 NOTE — PROGRESS NOTES
"Nutrition Note         Clinical Course: Patient is a 78 y.o. female who was admitted on 7/1/2023 with a diagnosis of Acute ischemic left MCA stroke (CMS/HCC) [I63.512].     Nutrition Interventions/ Recommendations:   1. Diet level per ST, (EC7, thin)  2.Chatted MD to discontinue \"cardiac\" diet restriction as well as consider calorie count.     3. Some Food preferences noted from discussion with friend, Thelma   4. Will change oral supplement and check acceptance (carnation inst breakfast, van fresh milk shake BID)  5. Monitor PO, weight, labs, skin    Nutrition risk level 2-3    Past Medical History:   Diagnosis Date   • AAA (abdominal aortic aneurysm) (CMS/[facility])    • Arthritis    • Elevated blood pressure reading without diagnosis of hypertension 4/19/2019   • Epistaxis 1/6/2020   • GERD (gastroesophageal reflux disease) 4/19/2019   • Glaucoma 4/19/2019   • Heart murmur    • Hiatal hernia    • History of hip replacement, total, right 4/19/2019    Right hip 9/ 2016 and revision 2017    • History of rheumatic fever as a child 4/19/2019   • Hypercholesterolemia 4/19/2019   • Ischemic cardiomyopathy 5/9/2019   • Kidney cysts    • Osteoarthritis of multiple joints 4/19/2019   • Osteoporosis    • Pacemaker 12/9/2019   • Raynaud's disease 4/19/2019   • RSD (reflex sympathetic dystrophy) 4/19/2019    Of left foot   • SOB (shortness of breath) 12/10/2019   • Status post insertion of drug eluting coronary artery stent 5/9/2019     Past Surgical History:   Procedure Laterality Date   • CHOLECYSTECTOMY OPEN     • CORONARY ANGIOPLASTY WITH STENT PLACEMENT  04/29/2019    of LAD   • CORONARY ANGIOPLASTY WITH STENT PLACEMENT  04/30/2019    of RCA   • DILATION AND CURETTAGE OF UTERUS     • EYE SURGERY      RIGHT CATARACT   • FOOT SURGERY Left    • JOINT REPLACEMENT Right 09/2016    total hip   • REVISION TOTAL HIP ARTHROPLASTY Right 2017   • TONSILLECTOMY              Dietary Orders   (From admission, onward)             Start     " "Ordered    07/07/23 1044  Adult Diet Easy to Chew EC7; Thin Liquids; RD/LDN may adjust order  Diet effective now        References:    IDDSI Diet reference   Question Answer Comment   Diet Texture Easy to Chew EC7    Fluid Consistency: Thin Liquids    Delegation of Authority. Diet orders written by PA/CRNPs may not be adjusted by RD/LDNs. RD/LDN may adjust order        07/07/23 1043                Reason for Assessment  Reason For Assessment: per organizational policy  Diagnosis:  (stroke)    [facility] Nutrition Screen Tool  Has patient lost weight without trying?: 2-->Unsure  Has patient been eating poorly due to decreased appetite?: 0-->No  MST Nutrition Screen Score: 2    Nutrition/Diet History  Typical Food/Fluid Intake: PTA: 2 meals; cooks own meals  Diet Prior to Admission: regular  Intake (%):  (0-25%)  Food Preferences: pt aphasic, RD spoke with friend  Meal/Snack Patterns: will be served 3 meals here  Supplemental Drinks/Foods/Additives: try carnation inst breakfast, fresh milk shake lunch and dinner (dislikes magic cups)  Vitamin/Mineral/Herbal Supplements: magox and KCL on hold  Food Allergies:  (onion allergy)  Factors Affecting Nutritional Intake: impaired cognitive status/motor control, difficulty/impaired swallowing (altered mood, frustration)    Physical Findings  Overall Physical Appearance:  (normally nourished)  Gastrointestinal:  (WDL)  Last Bowel Movement: 07/07/23  Skin:  (rash perineum)    Nutrition Order  Nutrition Order: meets nutritional requirements  Nutrition Order Comments: EC7 thin (request cardiac diet restriction be removed)    Anthropometrics  Height: 172.7 cm (5' 8\")    Wt Readings from Last 3 Encounters:   07/07/23 60.8 kg (134 lb)   07/01/23 64 kg (141 lb)   03/28/23 63.5 kg (140 lb)       Weights (last 7 days)     Date/Time Weight    07/07/23 0600 60.8 kg (134 lb)    07/06/23 0600 61.1 kg (134 lb 9.6 oz)    07/05/23 0600 61.3 kg (135 lb 3.2 oz)    07/03/23 1200 61.2 kg (135 lb)    " 07/01/23 1102 63.6 kg (140 lb 5 oz)               Current Weight  Weight Method: Bed scale  Weight: 60.8 kg (134 lb)    Ideal Body Weight (IBW)  Ideal Body Weight (IBW) (kg): 64.15  % Ideal Body Weight: 99.22    Usual Body Weight (UBW)  Usual Body Weight: 63.5 kg (140 lb)  Weight Loss:  (6# X 1 week (if accurate))    Body Mass Index (BMI)  BMI (Calculated): 20.4  BMI Assessment: BMI less than 22: patient > 65 years old  Nutritional Status/Malnutrition: Does not meet criteria for malnutrition       Labs/Procedures/Meds  Lab Results Reviewed: reviewed  Lab Results Comments: 7/1: Cl 112H, Glucose 104H, RBC 3.62L, Hgb 11.2L, Hct 33.4L    CMP Results       07/06/23 07/04/23 07/03/23     1453 0519 0542     141 140    K 4.5 3.2 3.2    Cl 107 107 108    CO2 28 26 25    Glucose 105 90 93    BUN 7 7 6    Creatinine 0.8 0.6 0.6    Calcium 9.4 8.3 8.4    Anion Gap 6 8 7    EGFR >60.0 >60.0 >60.0        Lab Results   Component Value Date    ALT 9 07/02/2023    AST 22 07/02/2023    ALKPHOS 51 07/02/2023    BILITOT 0.8 07/02/2023     Lab Results   Component Value Date    ZDRTOEDD81 182 07/02/2023     Lab Results   Component Value Date    CALCIUM 9.4 07/06/2023     Lab Results   Component Value Date    WBC 4.83 07/02/2023    HGB 11.4 (L) 07/02/2023    HCT 34.6 (L) 07/02/2023    MCV 92.0 07/02/2023     (L) 07/02/2023     No results found for: IRON, TIBC, FERRITIN  Lab Results   Component Value Date    CHOL 178 06/26/2023    CHOL 163 03/31/2023    CHOL 156 02/04/2022     Lab Results   Component Value Date    HDL 71 06/26/2023    HDL 80 03/31/2023    HDL 71 02/04/2022     Lab Results   Component Value Date    LDLCALC 88 06/26/2023    LDLCALC 66 03/31/2023    LDLCALC 66 02/04/2022     Lab Results   Component Value Date    TRIG 93 06/26/2023    TRIG 87 03/31/2023    TRIG 107 02/04/2022     No results found for: CHOLHDL  Glucose Results       06/26/23     1524    HBG A1C 5.4    EST AVG GLUCOSE 108         Comment for EST AVG  GLUCOSE at 1524 on 06/26/23: Estimate of average glucose concentration continuously over 24 hours for previous 2 to 3 months(Per ADA Recommendation).            • apixaban  5 mg oral BID   • cycloSPORINE  1 drop Both Eyes q12h ROBERTO   • lansoprazole  30 mg oral Daily before breakfast   • [Provider Managed Hold] magnesium oxide  400 mg oral BID   • melatonin  3 mg oral Nightly   • [Provider Managed Hold] potassium chloride  40 mEq oral TID   • rosuvastatin  20 mg oral Nightly   • trazodone  25 mg oral Nightly           Diagnostic Tests/Procedures  Diagnostic Test/Procedure Reviewed: reviewed  Diagnostic Test/Procedures Comments: echocardiogram on June 27, 2023 reported left ventricle EF 43% and a localized apical aneurysm containing thrombus which most likely caused acute CVA    Medications  Pertinent Medications Reviewed: reviewed  Pertinent Medications Comments: prevacid, crestor         Calorie Requirements  Estimated kCal Needs: Actual Body Weight  Estimated Calorie Need Method: kcal/kg  Calorie/kg Recommended: 25-30  Calorie Recommendations: 4412-1845    Protein Requirements  Recommended Dosing Weight (Estimated Protein Needs): Actual Body Weight  Est Protein Requirement Amount (gms/kg): 1.2-->1.2 gm protein (1.1 -1.2)  Protein Recommendations: 70-76    Fluid Requirements  Fluid Recommendation (mL): 25-30ml  Recommended Fluid Needs Dosing Weight: Actual Body Weight  Fluid Requirements (mL/day): 0345-5242    PES  Statement: PES Statement  Nutrition Diagnosis: Inadequate Protein-Energy Intake  Related To:: Decreased ability to consume sufficient energy  As Evidenced By:: PO 0-25%, weight loss  Nutritional Needs Met?: No                                   Clinical Comments:     Pt seen at bedside, aphasic, communication limited, pt frustrated about trying to  Do menu.  RD contacted pt friend Thelma and discussed food preferences. Also Thelma states  Friends can bring in some soft foods that pt may like.   (might eat,  "grilled cheese, mac and cheese, cheeseburger, chicken salad)  Likes (but not on diet level, cheese steaks, cold cereal,) does not like vegetables, yogurt, gelatin.  Refused the magic cups.  If weight correct, 6# down from admission/at risk for malnutrition with poor intake and  Weight loss.  Chatted MD to discontinue \"cardiac\" diet restriction as well as consider calorie count.       Goals:  Take PO to meet > 75% nutrition needs  Prevent further weight loss (< 134#)        Date: 07/07/23  Signature: LETA Sousa    "

## 2023-07-08 ENCOUNTER — APPOINTMENT (INPATIENT)
Dept: PHYSICAL THERAPY | Facility: REHABILITATION | Age: 78
DRG: 057 | End: 2023-07-08
Payer: MEDICARE

## 2023-07-08 ENCOUNTER — APPOINTMENT (INPATIENT)
Dept: SPEECH THERAPY | Facility: REHABILITATION | Age: 78
DRG: 057 | End: 2023-07-08
Payer: MEDICARE

## 2023-07-08 PROCEDURE — 63700000 HC SELF-ADMINISTRABLE DRUG: Performed by: STUDENT IN AN ORGANIZED HEALTH CARE EDUCATION/TRAINING PROGRAM

## 2023-07-08 PROCEDURE — 97530 THERAPEUTIC ACTIVITIES: CPT | Mod: GP,59

## 2023-07-08 PROCEDURE — 97112 NEUROMUSCULAR REEDUCATION: CPT | Mod: GP,59

## 2023-07-08 PROCEDURE — 12800000 HC ROOM AND CARE SEMIPRIVATE REHAB

## 2023-07-08 PROCEDURE — 63700000 HC SELF-ADMINISTRABLE DRUG: Performed by: HOSPITALIST

## 2023-07-08 PROCEDURE — 92507 TX SP LANG VOICE COMM INDIV: CPT | Mod: GN

## 2023-07-08 RX ADMIN — APIXABAN 5 MG: 5 TABLET, FILM COATED ORAL at 08:37

## 2023-07-08 RX ADMIN — APIXABAN 5 MG: 5 TABLET, FILM COATED ORAL at 20:13

## 2023-07-08 RX ADMIN — LANSOPRAZOLE 30 MG: KIT at 08:39

## 2023-07-08 RX ADMIN — CYCLOSPORINE 1 DROP: 0.5 EMULSION OPHTHALMIC at 08:38

## 2023-07-08 RX ADMIN — CYCLOSPORINE 1 DROP: 0.5 EMULSION OPHTHALMIC at 20:13

## 2023-07-08 RX ADMIN — TRAZODONE HYDROCHLORIDE 25 MG: 50 TABLET, FILM COATED ORAL at 20:12

## 2023-07-08 RX ADMIN — Medication 3 MG: at 20:13

## 2023-07-08 RX ADMIN — ROSUVASTATIN CALCIUM 20 MG: 20 TABLET, FILM COATED ORAL at 20:13

## 2023-07-08 NOTE — PLAN OF CARE
Problem: Rehabilitation (IRF) Plan of Care  Goal: Plan of Care Review  Outcome: Progressing  Flowsheets (Taken 7/8/2023 1418)  Progress: improving  Plan of Care Reviewed With: patient  Outcome Evaluation: Pt continent of bladder. Shower completed, pt refused to wash her hair. 1:1 at bedside. Pt continues to not want to eat meals. Ate about 5% of breakfast and lunch. Pt took medications with no issues. Call bell within reach at all times.   Plan of Care Review  Plan of Care Reviewed With: patient  Progress: improving  Outcome Evaluation: Pt continent of bladder. Shower completed, pt refused to wash her hair. 1:1 at bedside. Pt continues to not want to eat meals. Ate about 5% of breakfast and lunch. Pt took medications with no issues. Call bell within reach at all times.

## 2023-07-08 NOTE — PROGRESS NOTES
Patient: Jennifer Sams  Location: [facility] [address]  MRN: [id number]  Today's date: 7/8/2023    History of Present Illness  Jennifer is a 78 y.o. female admitted on 7/1/2023 with Acute ischemic left MCA stroke (CMS/HCC) [I63.512]. Principal problem is Acute ischemic left MCA stroke (CMS/HCC).    The patient is a 78-year-old  female with a history of AAA, heart block s/p cardiac pacemaker, glaucoma, hyperlipidemia, coronary artery disease, NSTEMI, who presented to [facility] on June 26 after she has not been seen by her friends for a few days.  Patient lives alone and when she was found by EMS she was confused and combative.  In the emergency department she was aphasic with right-sided weakness and hypertensive to the 190 systolic.  CT scan of the head revealed an acute infarct in left MCA with mild bleeding.  Echo showed an EF of 43% and a bubble study was negative.  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  Later this was transitioned to apixaban.  She was continued on statin for secondary prevention.  She was evaluated by speech therapy and cleared for a soft and bite-size diet with moderately thick liquids.    7/2/2023:  Diet SB6 with mildly thick liquids (MT2)  7/6/2023: Upgraded to EC7 with thin liquids    Past Medical History  Jennifer has a past medical history of AAA (abdominal aortic aneurysm), Arthritis, Elevated blood pressure reading without diagnosis of hypertension (4/19/2019), Epistaxis (1/6/2020), GERD (gastroesophageal reflux disease) (4/19/2019), Glaucoma (4/19/2019), Heart murmur, Hiatal hernia, History of hip replacement, total, right (4/19/2019), History of rheumatic fever as a child (4/19/2019), Hypercholesterolemia (4/19/2019), Ischemic cardiomyopathy (5/9/2019), Kidney cysts, Osteoarthritis of multiple joints (4/19/2019), Osteoporosis, Pacemaker (12/9/2019),  Raynaud's disease (4/19/2019), RSD (reflex sympathetic dystrophy) (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        PT Vitals    Date/Time Pulse HR Source SpO2 Pt Activity O2 Therapy BP BP Location BP Method Pt Position Observations [facility]   07/08/23 1300 68 Monitor 96 % At rest None (Room air) 118/65 Left upper arm Automatic Sitting Pre PT: no pain or discomfort AMZ   07/08/23 1330 72 Monitor 97 % Walking None (Room air) 120/70 Left upper arm Automatic Sitting after activity Post PT: visible fatigue noted AMZ      PT Pain    Date/Time Pain Type Rating: Rest Rating: Activity Rating: Rest Rating: Activity [facility]   07/08/23 1300 Pain Assessment 0 0 0 - no pain 0 - no pain AMZ   07/08/23 1330 Pain Assessment 0 0 0 - no pain 0 - no pain AMZ          Prior Living Environment    Flowsheet Row Most Recent Value   People in Home alone   Current Living Arrangements home   Home Accessibility --  [2 SH with 1 DARÍO]   Living Environment Comment Per chart review, pt lives alone in a 2SH with 1STE   Number of Stairs, Main Entrance 1   Number of Stairs, Within Home, Primary 12  [Full fight to second floor]          Prior Level of Function    Flowsheet Row Most Recent Value   Dominant Hand --  [Unable to determine 2/2 cognitive/communication deficits]   Ambulation independent   Transferring independent   Toileting independent   Bathing independent   Dressing independent   Eating independent   IADLs independent   Communication understands/communicates without difficulty   Swallowing swallows foods/liquids without difficulty   Baseline Diet/Method of Nutritional Intake no diet restrictions   Past History of Dysphagia no   Prior Level of Function Comment Per chart review: PMH SB6,  MT2,  No VFSS in EMR.  Pt seen by SLP at [place].   Assistive Device Currently Used at Home --  [Per chart review pt has RW at home - unclear if pt was using AD and pt unable to  state PLOF at this time]           IRF PT Evaluation and Treatment - 07/08/23 1300        PT Time Calculation    Start Time 1300     Stop Time 1330     Time Calculation (min) 30 min        Session Details    Document Type Daily Treatment/Progress Note     Mode of Treatment individual therapy;physical therapy        General Information    General Observations of Patient Pt received directly from 1:1 seated on mat in gym. Pt pleasant, cooperative, motivated to participate.        Mobility Belt    Mobility Belt Used for All Out of Bed Activity no     Reason Mobility Belt Not Used patient refused        Sit to Stand Transfer    Faxon, Sit to Stand Transfer close supervision     Safety/Cues impulsivity     Assistive Device none     Comment from mat        Stand to Sit Transfer    Faxon, Stand to Sit Transfer close supervision     Safety/Cues impulsivity     Assistive Device none     Comment to mat        Stand Pivot Transfer    Faxon, Stand Pivot/Stand Step Transfer close supervision     Safety/Cues impulsivity     Assistive Device none     Comment via ambulatory SPTs        Gait Training    Faxon, Gait close supervision     Safety/Cues impulsivity     Assistive Device none     Distance in Feet 300 feet   x2 trials, 100'x1    Pattern step-through     Deviations/Abnormal Patterns base of support, narrow;argenis decreased;gait speed decreased;step length decreased;stride length decreased;weight shifting decreased     Bilateral Gait Deviations heel strike decreased     [place]axon, Picking Up Object touching/steadying assist   picking hurdles off the floor with steadying assist for balance/safety    Comment (Gait/Stairs) CS amb 300'x2 trials, 100'x1 without AD over level surfaces; inconsistent varied gait speeds, dec bilateral reciprocal arm swing, narrowed LJ, dec bilateral step length        Step Over Obstacle    Faxon touching/steadying assist     Assistive Device none     Comment  "steadying assistance at pelvis to perform fwd ambulation via reciprocal pattern over 6 hurdles x6 trials, +impulsivity but no LOB        Balance    Comment, Balance steadying assistance at pelvis for upright posture/balance to perform alternating unilateral LE dynamic tap ups onto 6\" block in static standing without BUE support, 2x10 reps each, increased retropulsion noted but no LOB        Daily Progress Summary (PT)    Symptoms Noted During/After Treatment fatigue     Progress Toward Functional Goals (PT) progressing toward functional goals as expected     Daily Outcome Statement Pt presents to PT session in no apparent pain or distress. Vitals stable t/o session. Pt following directional commands accurately for way finding activities during amb trials 50% of the time with min prompting and increased time/effort. Pt with no overt episodes of LOB this session. Con't with PT POC and progress as tolerated with emphasis on dynamic balance, coordination, community reintegration tasks to maximize overall functional mobility to decrease falls risk prior to DC. Pt left with 1:1 in direct handoff.     Barriers to Overall Progress (PT) dec cog, aphasia, dec dynamic balance     Recommendations (PT) NMRE, way finding activities, coordination, endurance                      Education Documentation  Positioning, taught by Layne Bueno, PT at 7/8/2023  3:10 PM.  Learner: Patient  Readiness: Acceptance  Method: Explanation, Demonstration  Response: Verbalizes Understanding, Demonstrated Understanding, Needs Reinforcement  Comment: reorientation, pacing, fall precautions, energy conservation techniques    Mobility, taught by Layne Bueno, PT at 7/8/2023  3:10 PM.  Learner: Patient  Readiness: Acceptance  Method: Explanation, Demonstration  Response: Verbalizes Understanding, Demonstrated Understanding, Needs Reinforcement  Comment: reorientation, pacing, fall precautions, energy conservation techniques          IRF PT " Goals    Flowsheet Row Most Recent Value   Bed Mobility Goal 1    Activity/Assistive Device bed mobility activities, all at 07/02/2023 0830   [place] supervision required at 07/02/2023 0830   Time Frame 1 week, short-term goal (STG) at 07/05/2023 0835   Strategies/Barriers not reassessed at 07/05/2023 0835   Progress/Outcome goal ongoing at 07/05/2023 0835   Bed Mobility Goal 2    Activity/Assistive Device bed mobility activities, all at 07/02/2023 0830   [place] modified independence at 07/02/2023 0830   Time Frame 2 weeks, long-term goal (LTG) at 07/05/2023 0835   Progress/Outcome goal ongoing at 07/05/2023 0835   Transfer Goal 1    Activity/Assistive Device sit-to-stand/stand-to-sit, bed-to-chair/chair-to-bed, stand pivot at 07/02/2023 0830   [place] supervision required at 07/02/2023 0830   Time Frame 1 week, short-term goal (STG) at 07/05/2023 0835   Strategies/Barriers steadying assist for safety at 07/05/2023 0835   Progress/Outcome goal ongoing at 07/05/2023 0835   Transfer Goal 2    Activity/Assistive Device sit-to-stand/stand-to-sit, bed-to-chair/chair-to-bed, stand pivot at 07/02/2023 0830   [place] modified independence at 07/02/2023 0830   Time Frame 2 weeks, long-term goal (LTG) at 07/05/2023 0835   Progress/Outcome goal ongoing at 07/05/2023 0835   Gait/Walking Locomotion Goal 1    Activity/Assistive Device gait (walking locomotion) at 07/02/2023 0830   Distance 250 feet at 07/02/2023 0830   [place] supervision required at 07/02/2023 0830   Time Frame 1 week, short-term goal (STG) at 07/05/2023 0835   Strategies/Barriers touching assist at 07/05/2023 0835   Progress/Outcome goal ongoing at 07/05/2023 0835   Gait/Walking Locomotion Goal 2    Activity/Assistive Device gait (walking locomotion) at 07/02/2023 0830   Distance 500 feet at 07/02/2023 0830   [place] supervision required at 07/02/2023 0830   Time Frame 2 weeks, long-term goal (LTG) at 07/05/2023 0862    Progress/Outcome goal ongoing at 07/05/2023 0835   Wheelchair Locomotion Goal 1    Activity wheelchair mobility skills, all at 07/02/2023 0830   Assistive Device manual, lightweight at 07/02/2023 0830   Distance 50 feet at 07/02/2023 0830   [place] minimum assist (75% or more patient effort) at 07/02/2023 0830   Time Frame 1 week, short-term goal (STG) at 07/05/2023 0835   Strategies/Barriers lower w/c ordered at 07/05/2023 0835   Progress/Outcome goal no longer appropriate at 07/05/2023 0835   Wheelchair Locomotion Goal 2    Activity wheelchair mobility skills, all at 07/02/2023 0830   Assistive Device manual, lightweight at 07/02/2023 0830   Distance 200 feet at 07/05/2023 0835   [place] modified independence at 07/05/2023 0835   Time Frame 2 weeks, long-term goal (LTG) at 07/05/2023 0835   Progress/Outcome goal revised this date at 07/05/2023 0835   Stairs Goal 1    Activity/Assistive Device stairs, all skills at 07/02/2023 0830   Number of Stairs 16 at 07/02/2023 0830   [place] supervision required at 07/02/2023 0830   Time Frame 1 week, short-term goal (STG) at 07/05/2023 0835   Strategies/Barriers touching assist at 07/05/2023 0835   Progress/Outcome goal ongoing at 07/05/2023 0835   Stairs Goal 2    Activity/Assistive Device stairs, all skills at 07/02/2023 0830   Number of Stairs 24 at 07/02/2023 0830   [place] supervision required at 07/02/2023 0830   Time Frame 2 weeks, long-term goal (LTG) at 07/05/2023 0835   Progress/Outcome goal ongoing at 07/05/2023 0835

## 2023-07-08 NOTE — PROGRESS NOTES
Patient: Jennifer Sams  Location: [facility] [address]  MRN: [id number]  Today's date: 7/8/2023    History of Present Illness  Jennifer is a 78 y.o. female admitted on 7/1/2023 with Acute ischemic left MCA stroke (CMS/HCC) [I63.512]. Principal problem is Acute ischemic left MCA stroke (CMS/HCC).    The patient is a 78-year-old  female with a history of AAA, heart block s/p cardiac pacemaker, glaucoma, hyperlipidemia, coronary artery disease, NSTEMI, who presented to [facility] on June 26 after she has not been seen by her friends for a few days.  Patient lives alone and when she was found by EMS she was confused and combative.  In the emergency department she was aphasic with right-sided weakness and hypertensive to the 190 systolic.  CT scan of the head revealed an acute infarct in left MCA with mild bleeding.  Echo showed an EF of 43% and a bubble study was negative.  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  Later this was transitioned to apixaban.  She was continued on statin for secondary prevention.  She was evaluated by speech therapy and cleared for a soft and bite-size diet with moderately thick liquids.    7/2/2023:  Diet SB6 with mildly thick liquids (MT2)  7/6/2023: Upgraded to EC7 with thin liquids    Past Medical History  Jennifer has a past medical history of AAA (abdominal aortic aneurysm), Arthritis, Elevated blood pressure reading without diagnosis of hypertension (4/19/2019), Epistaxis (1/6/2020), GERD (gastroesophageal reflux disease) (4/19/2019), Glaucoma (4/19/2019), Heart murmur, Hiatal hernia, History of hip replacement, total, right (4/19/2019), History of rheumatic fever as a child (4/19/2019), Hypercholesterolemia (4/19/2019), Ischemic cardiomyopathy (5/9/2019), Kidney cysts, Osteoarthritis of multiple joints (4/19/2019), Osteoporosis, Pacemaker (12/9/2019),  Raynaud's disease (4/19/2019), RSD (reflex sympathetic dystrophy) (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        SLP Vitals    Date/Time Pulse HR Source SpO2 Pt Activity O2 Therapy BP BP Location BP Method Pt Position Observations [facility]   07/08/23 1330 72 Monitor 97 % Walking None (Room air) 120/70 Left upper arm Automatic Sitting after activity Post PT: visible fatigue noted AMZ      SLP Pain    Date/Time Pain Type Rating: Rest Rating: Activity Rating: Rest Rating: Activity [facility]   07/08/23 1330 Pain Assessment 0 0 0 - no pain 0 - no pain AMZ          Prior Living Environment    Flowsheet Row Most Recent Value   People in Home alone   Current Living Arrangements home   Home Accessibility --  [2 SH with 1 DARÍO]   Living Environment Comment Per chart review, pt lives alone in a 2SH with 1STE   Number of Stairs, Main Entrance 1   Number of Stairs, Within Home, Primary 12  [Full fight to second floor]          Prior Level of Function    Flowsheet Row Most Recent Value   Dominant Hand --  [Unable to determine 2/2 cognitive/communication deficits]   Ambulation independent   Transferring independent   Toileting independent   Bathing independent   Dressing independent   Eating independent   IADLs independent   Communication understands/communicates without difficulty   Swallowing swallows foods/liquids without difficulty   Baseline Diet/Method of Nutritional Intake no diet restrictions   Past History of Dysphagia no   Prior Level of Function Comment Per chart review: [facility] SB6,  MT2,  No VFSS in EMR.  Pt seen by SLP at [place] [facility].   Assistive Device Currently Used at Home --  [Per chart review pt has RW at home - unclear if pt was using AD and pt unable to state PLOF at this time]           IRF SLP Evaluation and Treatment - 07/08/23 1335        SLP Time Calculation    Start Time 1330     Stop Time 1400     Time Calculation (min) 30 min         "Session Details    Document Type Daily Treatment/Progress Note     Mode of Treatment speech language pathology;individual therapy        General Information    Patient Profile Reviewed yes     General Observations of Patient Pt received in her room [facility] 1:1 present the session. Pt sat up in her bed and refused to keep her side rails up. Through gesture, pt expressed that she was very tired and that she did not want participate in therapy. With short breaks and change of tasks, pt participated with mod prompts and cues. Pt gestured throughout the session vs. using oral speech.        Auditory Comprehension    Auditory Comprehension Intervention simple;following directions;comprehension of questions/sentences;comprehension of single words     Comment, Intervention (Auditory Comprehension) Following 1 step directions: 2/5 correct (wave to me,  your glasses) with mod reps and cues; 3/5 were perseverations from previous directions. Pt cont to shake her head and said \"no\" throughout the task. Answering simple/paired bio y/n ?s: 2/8 correct with max reps and gestural cue. A/C for pointing to pictures of common objects with f=4: 6/8 correct with 1-2 reps and min cues. Pt needed instructions to wait before pointing to hear all of the information. Atempted to use a 6 picture paper communication board but pt kept speaking and pointing to the same picture.Pt continued to shake her head and reject the task. She covered her ears, pulled her bed covers up and rejected the task. Oral reading of concrete object picture labels: 6/10 correct.        Verbal Expression    Verbal Expression Intervention automatic speech;word retrieval activities;word level     Comment, Intervention (Verbal Expression) Attempted serial speech tasks and pt refused and covered her mouth. Simple opposites: same response. social speech sentence completions: pt refused. Perseverative, empty speech produced throughout the session.        Daily Progress " Summary (SLP)    Daily Outcome Statement Pt cont to demonstrate difficulty/refusal to attempt verbal expression task and uses gestures instead. R/C for simple words continues to exceed A/C. Cont witih POC of primary SLP.                      IRF SLP Goals    Flowsheet Row Most Recent Value   Auditory Comprehension Goal 1    Auditory Comprehension Goal 1 pt will complete basic Aud comprehension tasks (simple yes/no 75%),  1 step commands 50% with max cues,  match word heard to picture f/o 2 50% max cues at 07/02/2023 1001   Time Frame short-term goal (STG), 1 week at 07/02/2023 1001   Progress/Outcome goal ongoing at 07/04/2023 1510   Auditory Comprehension Goal 2    Auditory Comprehension Goal 2 Pt will understand mod-complex level y/n questions 90% min cues,  2 step commands 75% min-mod cues,  at 07/02/2023 1001   Time Frame long-term goal (LTG), 4 weeks at 07/02/2023 1001   Progress/Outcome goal ongoing at 07/04/2023 1510   Verbal Expression Goal 1    Verbal Expression Goal 1 Pt will complete basic VE tasks (name. automatics, simple opposites, naming) with 20% accuracy and max cues. at 07/02/2023 1001   Time Frame short-term goal (STG), 1 week at 07/02/2023 1001   Progress/Outcome goal ongoing at 07/04/2023 1510   Verbal Expression Goal 2    Verbal Expression Goal 2 Pt will complete Verbal expression tasks (basic-mod) with 50-75% accuracy and min-mod cues.  Pt will imitate vowels 50% max cues,  at 07/02/2023 1001   Time Frame long-term goal (LTG), 4 weeks at 07/02/2023 1001   Progress/Outcome goal ongoing at 07/04/2023 1510   Motor Speech/Voice Goal 1    Motor Speech/Voice Goal 1 Repetition of vowels, CV/VC and CVC targets with max cues and models for 60% acc. at 07/04/2023 1510   Time Frame short-term goal (STG), 2 weeks at 07/04/2023 1510   Progress/Outcome goal ongoing at 07/04/2023 1510   Motor Speech/Voice Goal 2    Motor Speech/Voice Goal 2 Repetition of vowels, CV/VC and CVC targets with min cues and models  for 90% acc. at 07/04/2023 1510   Time Frame long-term goal (LTG), 4 weeks at 07/04/2023 1510   Progress/Outcome goal ongoing at 07/04/2023 1510   Reading Comprehension Goal 1    Reading Comprehension Goal 1 Pt will match word to picture field of 2 with 50% accuracy and mod-max cues,  Pt will read single functional words with 20% accuracy max cues at 07/02/2023 1001   Time Frame short-term goal (STG), 1 week at 07/02/2023 1001   Progress/Outcome goal no longer appropriate at 07/04/2023 1510   Reading Comprehension Goal 2    Reading Comprehension Goal 2 Pt will read simple sentences and comprehend simple sentences with 75% accuracy min-mod cues at 07/02/2023 1001   Time Frame long-term goal (LTG) at 07/02/2023 1001   Progress/Outcome goal no longer appropriate at 07/04/2023 1510   Written Expression Goal 1    Written Expression Goal 1 Pt will copy first name, shapes, letters and 3-4 letter words with 80% min cues,  pt will copy letters, name shapes with 50% accuracy and mod cues. at 07/02/2023 1001   Time Frame short-term goal (STG), 1 week at 07/02/2023 1001   Progress/Outcome goal no longer appropriate at 07/04/2023 1510   Written Expression Goal 2    Written Expression Goal 2 Pt will write first and last name,  single words 90% accuracy at 07/02/2023 1001   Progress/Outcome goal no longer appropriate at 07/04/2023 1510   Oral Nutrition/Hydration Goal 1    Activity effective/safe, use of swallowing techniques, management of texture/viscosity, other (see comments)  [SB6,  MT2.  Trials of thins and various textures by speech with min s/s of aspiration] at 07/02/2023 1001   Time Frame short-term goal (STG), 14 days or less at 07/02/2023 1001   Progress/Outcome goal ongoing at 07/04/2023 1510   Oral Nutrition/Hydration Goal 2    Activity effective/safe, oral nutrition/hydration, use of swallowing techniques, management of texture/viscosity, other (see comments)  [regular and thins] at 07/02/2023 1001   Time Frame  long-term goal (LTG), 4 weeks at 07/02/2023 1001   Progress/Outcome goal ongoing at 07/04/2023 1510   Oral Motor Exercise Goal 1    Activity perform oral motor strengthening exercises, facial/cheek, lip, tongue, strength/ROM, mastication/bolus manipulation, 5-10 times per session, other (see comments)  [use mirror/visual model] at 07/02/2023 1001   [place] with 1:1 supervision/constant cues at 07/02/2023 1001   Time Frame short-term goal (STG), 14 days or less at 07/02/2023 1001   Progress/Outcome goal ongoing at 07/04/2023 1510   Oral Motor Exercise Goal 2    Activity perform oral motor strengthening exercises, facial/cheek, lip, tongue, strength/ROM, mastication/bolus manipulation, other (see comments)  [2-3 times a day sets of 10-15] at 07/02/2023 1001   [place] with minimal cues (75-90% accuracy) at 07/02/2023 1001   Time Frame long-term goal (LTG), 4 weeks at 07/02/2023 1001   Progress/Outcome goal ongoing at 07/04/2023 1510

## 2023-07-08 NOTE — PROGRESS NOTES
Patient: Jennifer Sams  Location: [facility] [address]  MRN: [id number]  Today's date: 7/8/2023    History of Present Illness  Jennifer is a 78 y.o. female admitted on 7/1/2023 with Acute ischemic left MCA stroke (CMS/HCC) [I63.512]. Principal problem is Acute ischemic left MCA stroke (CMS/HCC).    The patient is a 78-year-old  female with a history of AAA, heart block s/p cardiac pacemaker, glaucoma, hyperlipidemia, coronary artery disease, NSTEMI, who presented to [facility] on June 26 after she has not been seen by her friends for a few days.  Patient lives alone and when she was found by EMS she was confused and combative.  In the emergency department she was aphasic with right-sided weakness and hypertensive to the 190 systolic.  CT scan of the head revealed an acute infarct in left MCA with mild bleeding.  Echo showed an EF of 43% and a bubble study was negative.  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  Later this was transitioned to apixaban.  She was continued on statin for secondary prevention.  She was evaluated by speech therapy and cleared for a soft and bite-size diet with moderately thick liquids.    7/2/2023:  Diet SB6 with mildly thick liquids (MT2)  7/6/2023: Upgraded to EC7 with thin liquids    Past Medical History  Jennifer has a past medical history of AAA (abdominal aortic aneurysm), Arthritis, Elevated blood pressure reading without diagnosis of hypertension (4/19/2019), Epistaxis (1/6/2020), GERD (gastroesophageal reflux disease) (4/19/2019), Glaucoma (4/19/2019), Heart murmur, Hiatal hernia, History of hip replacement, total, right (4/19/2019), History of rheumatic fever as a child (4/19/2019), Hypercholesterolemia (4/19/2019), Ischemic cardiomyopathy (5/9/2019), Kidney cysts, Osteoarthritis of multiple joints (4/19/2019), Osteoporosis, Pacemaker (12/9/2019),  Raynaud's disease (4/19/2019), RSD (reflex sympathetic dystrophy) (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        SLP Pain    Date/Time Rating: Rest Who   07/08/23 0940 0 KM   07/08/23 0958 0 KM          Prior Living Environment    Flowsheet Row Most Recent Value   People in Home alone   Current Living Arrangements home   Home Accessibility --  [2 SH with 1 DARÍO]   Living Environment Comment Per chart review, pt lives alone in a 2SH with 1STE   Number of Stairs, Main Entrance 1   Number of Stairs, Within Home, Primary 12  [Full fight to second floor]          Prior Level of Function    Flowsheet Row Most Recent Value   Dominant Hand --  [Unable to determine 2/2 cognitive/communication deficits]   Ambulation independent   Transferring independent   Toileting independent   Bathing independent   Dressing independent   Eating independent   IADLs independent   Communication understands/communicates without difficulty   Swallowing swallows foods/liquids without difficulty   Baseline Diet/Method of Nutritional Intake no diet restrictions   Past History of Dysphagia no   Prior Level of Function Comment Per chart review: PMH SB6,  MT2,  No VFSS in EMR.  Pt seen by SLP at [place].   Assistive Device Currently Used at Home --  [Per chart review pt has RW at home - unclear if pt was using AD and pt unable to state PLOF at this time]           IRF SLP Evaluation and Treatment - 07/08/23 0942        SLP Time Calculation    Start Time 0930     Stop Time 1000     Time Calculation (min) 30 min        Session Details    Document Type Daily Treatment/Progress Note     Mode of Treatment individual therapy;speech language pathology        General Information    Patient Profile Reviewed yes     General Observations of Patient pleasant and cooperative, pt seated in bed, agreed to therapy if in bed, not her w/c        Auditory Comprehension    Follows  "Commands (Auditory Comprehension) 1-step command     1 Step, Follows Commands (Auditory Comprehension) 25-49% accuracy     Auditory Comprehension Intervention comprehension of single words;following directions;simple     Comment, Intervention (Auditory Comprehension) Following single commands-3/8 [place]. Increased to 5/8 with mod cues. PT did want to use low tech AAC board. F2- pic cards, 5/10 accuracy,Indly, increased to 8/10 with mod cues.        Verbal Expression    Comment, Intervention (Verbal Expression) Pt uses nonverbal communication via gestures to communicate for majority of session despite encouragement for repetiton of single words. Pt pointed to pic of w/c on communication board and shook her head \"no\" and waved her arms. No sponataneous verbal approximations.        Daily Progress Summary (SLP)    Daily Outcome Statement Good participation with use of non-verbal communication.Session focused on basic functional auditory comprehension and verbal expression. Continue to target use of communication board for simple comprehension and verbal expression, attention, and speech approximation per POC primary SLP.     Symptoms Noted During/After Treatment none                      IRF SLP Goals    Flowsheet Row Most Recent Value   Auditory Comprehension Goal 1    Auditory Comprehension Goal 1 pt will complete basic Aud comprehension tasks (simple yes/no 75%),  1 step commands 50% with max cues,  match word heard to picture f/o 2 50% max cues at 07/02/2023 1001   Time Frame short-term goal (STG), 1 week at 07/02/2023 1001   Progress/Outcome goal ongoing at 07/04/2023 1510   Auditory Comprehension Goal 2    Auditory Comprehension Goal 2 Pt will understand mod-complex level y/n questions 90% min cues,  2 step commands 75% min-mod cues,  at 07/02/2023 1001   Time Frame long-term goal (LTG), 4 weeks at 07/02/2023 1001   Progress/Outcome goal ongoing at 07/04/2023 1510   Verbal Expression Goal 1    Verbal Expression " Goal 1 Pt will complete basic VE tasks (name. automatics, simple opposites, naming) with 20% accuracy and max cues. at 07/02/2023 1001   Time Frame short-term goal (STG), 1 week at 07/02/2023 1001   Progress/Outcome goal ongoing at 07/04/2023 1510   Verbal Expression Goal 2    Verbal Expression Goal 2 Pt will complete Verbal expression tasks (basic-mod) with 50-75% accuracy and min-mod cues.  Pt will imitate vowels 50% max cues,  at 07/02/2023 1001   Time Frame long-term goal (LTG), 4 weeks at 07/02/2023 1001   Progress/Outcome goal ongoing at 07/04/2023 1510   Motor Speech/Voice Goal 1    Motor Speech/Voice Goal 1 Repetition of vowels, CV/VC and CVC targets with max cues and models for 60% acc. at 07/04/2023 1510   Time Frame short-term goal (STG), 2 weeks at 07/04/2023 1510   Progress/Outcome goal ongoing at 07/04/2023 1510   Motor Speech/Voice Goal 2    Motor Speech/Voice Goal 2 Repetition of vowels, CV/VC and CVC targets with min cues and models for 90% acc. at 07/04/2023 1510   Time Frame long-term goal (LTG), 4 weeks at 07/04/2023 1510   Progress/Outcome goal ongoing at 07/04/2023 1510   Reading Comprehension Goal 1    Reading Comprehension Goal 1 Pt will match word to picture field of 2 with 50% accuracy and mod-max cues,  Pt will read single functional words with 20% accuracy max cues at 07/02/2023 1001   Time Frame short-term goal (STG), 1 week at 07/02/2023 1001   Progress/Outcome goal no longer appropriate at 07/04/2023 1510   Reading Comprehension Goal 2    Reading Comprehension Goal 2 Pt will read simple sentences and comprehend simple sentences with 75% accuracy min-mod cues at 07/02/2023 1001   Time Frame long-term goal (LTG) at 07/02/2023 1001   Progress/Outcome goal no longer appropriate at 07/04/2023 1510   Written Expression Goal 1    Written Expression Goal 1 Pt will copy first name, shapes, letters and 3-4 letter words with 80% min cues,  pt will copy letters, name shapes with 50% accuracy and  mod cues. at 07/02/2023 1001   Time Frame short-term goal (STG), 1 week at 07/02/2023 1001   Progress/Outcome goal no longer appropriate at 07/04/2023 1510   Written Expression Goal 2    Written Expression Goal 2 Pt will write first and last name,  single words 90% accuracy at 07/02/2023 1001   Progress/Outcome goal no longer appropriate at 07/04/2023 1510   Oral Nutrition/Hydration Goal 1    Activity effective/safe, use of swallowing techniques, management of texture/viscosity, other (see comments)  [SB6,  MT2.  Trials of thins and various textures by speech with min s/s of aspiration] at 07/02/2023 1001   Time Frame short-term goal (STG), 14 days or less at 07/02/2023 1001   Progress/Outcome goal ongoing at 07/04/2023 1510   Oral Nutrition/Hydration Goal 2    Activity effective/safe, oral nutrition/hydration, use of swallowing techniques, management of texture/viscosity, other (see comments)  [regular and thins] at 07/02/2023 1001   Time Frame long-term goal (LTG), 4 weeks at 07/02/2023 1001   Progress/Outcome goal ongoing at 07/04/2023 1510   Oral Motor Exercise Goal 1    Activity perform oral motor strengthening exercises, facial/cheek, lip, tongue, strength/ROM, mastication/bolus manipulation, 5-10 times per session, other (see comments)  [use mirror/visual model] at 07/02/2023 1001   [place] with 1:1 supervision/constant cues at 07/02/2023 1001   Time Frame short-term goal (STG), 14 days or less at 07/02/2023 1001   Progress/Outcome goal ongoing at 07/04/2023 1510   Oral Motor Exercise Goal 2    Activity perform oral motor strengthening exercises, facial/cheek, lip, tongue, strength/ROM, mastication/bolus manipulation, other (see comments)  [2-3 times a day sets of 10-15] at 07/02/2023 1001   [place] with minimal cues (75-90% accuracy) at 07/02/2023 1001   Time Frame long-term goal (LTG), 4 weeks at 07/02/2023 1001   Progress/Outcome goal ongoing at 07/04/2023 1510

## 2023-07-08 NOTE — PLAN OF CARE
Problem: Rehabilitation (IRF) Plan of Care  Goal: Plan of Care Review  Flowsheets (Taken 7/8/2023 0217)  Progress: improving  Plan of Care Reviewed With: patient  Outcome Evaluation: Pt with expressive aphasia, bladder continent, ambulated with gait belt, denied pain, sleeping well, 1:1 with patient.   Plan of Care Review  Plan of Care Reviewed With: patient  Progress: improving  Outcome Evaluation: Pt with expressive aphasia, bladder continent, ambulated with gait belt, denied pain, sleeping well, 1:1 with patient.

## 2023-07-09 ENCOUNTER — APPOINTMENT (INPATIENT)
Dept: OCCUPATIONAL THERAPY | Facility: REHABILITATION | Age: 78
DRG: 057 | End: 2023-07-09
Payer: MEDICARE

## 2023-07-09 ENCOUNTER — APPOINTMENT (INPATIENT)
Dept: OTHER | Facility: REHABILITATION | Age: 78
DRG: 057 | End: 2023-07-09
Payer: MEDICARE

## 2023-07-09 ENCOUNTER — APPOINTMENT (INPATIENT)
Dept: SPEECH THERAPY | Facility: REHABILITATION | Age: 78
DRG: 057 | End: 2023-07-09
Payer: MEDICARE

## 2023-07-09 PROCEDURE — 63700000 HC SELF-ADMINISTRABLE DRUG: Performed by: HOSPITALIST

## 2023-07-09 PROCEDURE — 97129 THER IVNTJ 1ST 15 MIN: CPT | Mod: GO

## 2023-07-09 PROCEDURE — 12800000 HC ROOM AND CARE SEMIPRIVATE REHAB

## 2023-07-09 PROCEDURE — 97530 THERAPEUTIC ACTIVITIES: CPT

## 2023-07-09 PROCEDURE — 63700000 HC SELF-ADMINISTRABLE DRUG: Performed by: STUDENT IN AN ORGANIZED HEALTH CARE EDUCATION/TRAINING PROGRAM

## 2023-07-09 PROCEDURE — 97535 SELF CARE MNGMENT TRAINING: CPT | Mod: GO

## 2023-07-09 PROCEDURE — 92507 TX SP LANG VOICE COMM INDIV: CPT | Mod: GN

## 2023-07-09 RX ADMIN — LANSOPRAZOLE 30 MG: KIT at 08:43

## 2023-07-09 RX ADMIN — ROSUVASTATIN CALCIUM 20 MG: 20 TABLET, FILM COATED ORAL at 21:27

## 2023-07-09 RX ADMIN — CYCLOSPORINE 1 DROP: 0.5 EMULSION OPHTHALMIC at 21:27

## 2023-07-09 RX ADMIN — APIXABAN 5 MG: 5 TABLET, FILM COATED ORAL at 21:27

## 2023-07-09 RX ADMIN — CYCLOSPORINE 1 DROP: 0.5 EMULSION OPHTHALMIC at 08:43

## 2023-07-09 RX ADMIN — Medication 3 MG: at 21:27

## 2023-07-09 RX ADMIN — TRAZODONE HYDROCHLORIDE 25 MG: 50 TABLET, FILM COATED ORAL at 21:27

## 2023-07-09 RX ADMIN — APIXABAN 5 MG: 5 TABLET, FILM COATED ORAL at 08:43

## 2023-07-09 NOTE — PROGRESS NOTES
Patient: Jennifer Sams  Location: [facility] [address]  MRN: [id number]  Today's date: 7/9/2023    History of Present Illness  Jennifer is a 78 y.o. female admitted on 7/1/2023 with Acute ischemic left MCA stroke (CMS/HCC) [I63.512]. Principal problem is Acute ischemic left MCA stroke (CMS/HCC).    The patient is a 78-year-old  female with a history of AAA, heart block s/p cardiac pacemaker, glaucoma, hyperlipidemia, coronary artery disease, NSTEMI, who presented to [facility] on June 26 after she has not been seen by her friends for a few days.  Patient lives alone and when she was found by EMS she was confused and combative.  In the emergency department she was aphasic with right-sided weakness and hypertensive to the 190 systolic.  CT scan of the head revealed an acute infarct in left MCA with mild bleeding.  Echo showed an EF of 43% and a bubble study was negative.  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  Later this was transitioned to apixaban.  She was continued on statin for secondary prevention.  She was evaluated by speech therapy and cleared for a soft and bite-size diet with moderately thick liquids.    7/2/2023:  Diet SB6 with mildly thick liquids (MT2)  7/6/2023: Upgraded to EC7 with thin liquids    Past Medical History  Jennifer has a past medical history of AAA (abdominal aortic aneurysm), Arthritis, Elevated blood pressure reading without diagnosis of hypertension (4/19/2019), Epistaxis (1/6/2020), GERD (gastroesophageal reflux disease) (4/19/2019), Glaucoma (4/19/2019), Heart murmur, Hiatal hernia, History of hip replacement, total, right (4/19/2019), History of rheumatic fever as a child (4/19/2019), Hypercholesterolemia (4/19/2019), Ischemic cardiomyopathy (5/9/2019), Kidney cysts, Osteoarthritis of multiple joints (4/19/2019), Osteoporosis, Pacemaker (12/9/2019),  Raynaud's disease (4/19/2019), RSD (reflex sympathetic dystrophy) (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        OT Vitals    Date/Time Pulse SpO2 O2 Therapy BP BP Location BP Method Pt Position [facility]   07/09/23 0903 75 94 % None (Room air) 130/73 Left upper arm Automatic Lying LM      OT Pain    Date/Time Pain Type CNPI: Nonverbal Movement CNPI: Nonverbal Rest CNPI: Facial Movement CNPI: Facial Rest CNPI: Bracing Movement CNPI: Bracing Rest CNPI: Restlessness Movement CNPI: Restlessness Rest CNPI: Rubbing Movement CNPI: Rubbing Rest CNPI: Vocal Movement CNPI: Vocal Rest CNPI: Score [facility]   07/09/23 0903 Pain Assessment 0 0 0 0 0 0 0 0 0 0 0 0 0 LM   07/09/23 0927 Pain Reassessment 0 0 0 0 0 0 0 0 0 0 0 0 0 LM          Prior Living Environment    Flowsheet Row Most Recent Value   People in Home alone   Current Living Arrangements home   Home Accessibility --  [2 SH with 1 DARÍO]   Living Environment Comment Per chart review, pt lives alone in a 2SH with 1STE   Number of Stairs, Main Entrance 1   Number of Stairs, Within Home, Primary 12  [Full fight to second floor]          Prior Level of Function    Flowsheet Row Most Recent Value   Dominant Hand --  [Unable to determine 2/2 cognitive/communication deficits]   Ambulation independent   Transferring independent   Toileting independent   Bathing independent   Dressing independent   Eating independent   IADLs independent   Communication understands/communicates without difficulty   Swallowing swallows foods/liquids without difficulty   Baseline Diet/Method of Nutritional Intake no diet restrictions   Past History of Dysphagia no   Prior Level of Function Comment Per chart review: PMH SB6,  MT2,  No VFSS in EMR.  Pt seen by SLP at [place].   Assistive Device Currently Used at Home --  [Per chart review pt has RW at home - unclear if pt was using AD and pt unable to state PLOF at this time]  "         Occupational Profile    Flowsheet Row Most Recent Value   Occupational History/Life Experiences Will need to clarify PLOF/driving status. Pt unable to state 2/2 cognitive/communication deficits.   Environmental Supports and Barriers Per chart review, pt lives alone but has supportive friends.   Patient Goals Pt unable to state goal 2/2 communication deficits.           IRF OT Evaluation and Treatment - 07/09/23 0850        OT Time Calculation    Start Time 0900     Stop Time 0930     Time Calculation (min) 30 min        Session Details    Document Type Daily Treatment/Progress Note     Mode of Treatment occupational therapy;individual therapy        General Information    General Observations of Patient Pt received sitting in wc in room with 1:1 present.        Mobility Belt    Mobility Belt Used for All Out of Bed Activity no     Reason Mobility Belt Not Used patient refused        Wrist/Hand Orthosis Management    Type (Wrist/Hand Orthosis) right     Fabrication Comment (Wrist/Hand Orthosis) R cockup splint donned throughout session, removed for gentle ROM only.        Cognition/Psychosocial    Comment, Cognition Towards end of session, pt expressing herself through hand gesteres and body language. Pt able to respond \"yes\" and \"no\" accurately. Pt with positive affect when observing pictures of OT's dog and cats. Pt able to show 4 fingers when reporting how many dogs she had (clarified by 1:1 sitter). Pt held phone to chest to \"hug\" animal.        Transfers    Transfers stand pivot transfer        Sit to Stand Transfer    [place], Sit to Stand Transfer close supervision     Safety/Cues impulsivity     Assistive Device none     Comment From EOB>stance.        Stand to Sit Transfer    [place], Stand to Sit Transfer close supervision     Safety/Cues impulsivity     Assistive Device none     Comment from stance>bed.        Stand Pivot Transfer    [place], Stand Pivot/Stand Step Transfer close " supervision     Safety/Cues impulsivity     Assistive Device none     Comment via amb approach with Cl S for safety to and from bed.        Balance    Comment, Balance After noting to pt dirty tissues piled up on bed and sliding table and presenting pt with trash can, pt able to retrieve trash, reaching outside LJ with Cl S-steadying A for safety and throw in trash can. Min cues for pt to sequence and recall where trash can was  after demonstrating difficulty with sequencing/STM.        Hand (Therapeutic Exercise)    Exercise Position/Type seated;PROM (passive range of motion)     General Exercise right     Reps and Sets 2x10     Comment seated at EOB, OT completes gentle ROM/stretch at R wrist/digits with R cockup splint removed then redonned following.        Grooming    Tasks oral care (brushing teeth, cleaning dentures)     Safety/Cues problem-solving;sequencing;termination;visual/perceptual;malaika/one-handed technique;initiation     Position supported standing     Setup Assistance obtain supplies     Adaptive Equipment none     [place], Oral Hygiene minimum assist (75% or more patient effort)     Comment Pt stood at sink to complete oral care with Cl S for safety, no AD and [place] assist to initiate using malaika technique when opening toothpaste and using toothbrush. Pt however resistant and insisted on using L hand to complete. Max multimodal cues required for sequencing, R side attention, intiation/termination of oral care (pt attempted to squirt toothpaste in mouth directly, rinse mouth before brushing, etc.)        Daily Progress Summary (OT)    Daily Outcome Statement Pt calm and fairly coopertive during session, focusing on cognition, R wrist mobility, grooming tasks, balance, and item retrieval. Pt continues to demonstrate apraxia with grooming tasks, benefitting from multimodal cues and [place] to facilitate RUE involvement. Pt showed positive affect and mood when therapist used therapuetic use of self and  showed pictures of cats/puppy. Pt used gestures and intermittently correct verbal responses to express her love for her cat. Pt with mild-mod fustration with ongoing aphasia, but encouragement provided. 1:1 sitter remained present for session. Continue OT per POC.                           IRF OT Goals    Flowsheet Row Most Recent Value   Transfer Goal 1    Activity/Assistive Device toilet at 07/02/2023 0711   [place] --  [Cl S] at 07/02/2023 0711   Time Frame short-term goal (STG), 5 - 7 days at 07/05/2023 0702   Progress/Outcome goal ongoing, goal revised this date at 07/05/2023 0702   Transfer Goal 2    Activity/Assistive Device toilet at 07/02/2023 0711   [place] supervision required at 07/02/2023 0711   Time Frame long-term goal (LTG), 14 days or less at 07/02/2023 0711   Progress/Outcome goal ongoing at 07/05/2023 0702   Transfer Goal 3    Activity/Assistive Device shower at 07/02/2023 0711   [place] --  [Cl S] at 07/02/2023 0711   Time Frame short-term goal (STG), 5 - 7 days at 07/02/2023 0711   Progress/Outcome goal ongoing, goal revised this date at 07/05/2023 0702   Transfer Goal 4    Activity/Assistive Device shower at 07/02/2023 0711   [place] supervision required at 07/02/2023 0711   Time Frame long-term goal (LTG), 14 days or less at 07/02/2023 0711   Progress/Outcome goal ongoing at 07/05/2023 0702   Bathing Goal 1    [place] --  [Steadying A] at 07/02/2023 0711   Time Frame short-term goal (STG), 5 - 7 days at 07/05/2023 0702   Progress/Outcome goal ongoing, goal revised this date at 07/05/2023 0702   Bathing Goal 2    [place] supervision required at 07/02/2023 0711   Time Frame long-term goal (LTG), 14 days or less at 07/02/2023 0711   Progress/Outcome goal ongoing at 07/05/2023 0702   UB Dressing Goal 1    [place] --  [Cl S] at 07/05/2023 0702   Time Frame short-term goal (STG), 5 - 7 days at 07/05/2023 0702   UB Dressing Goal 2    [place] set-up required at  07/02/2023 0711   Time Frame long-term goal (LTG), 14 days or less at 07/02/2023 0711   Progress/Outcome goal ongoing at 07/05/2023 0702   LB Dressing Goal 1    [place] --  [Cl S] at 07/05/2023 0702   Time Frame short-term goal (STG), 5 - 7 days at 07/05/2023 0702   LB Dressing Goal 2    [place] supervision required at 07/02/2023 0711   Time Frame long-term goal (LTG), 14 days or less at 07/02/2023 0711   Progress/Outcome goal ongoing at 07/05/2023 0702   Grooming Goal 1    [place] --  [Steadying A] at 07/05/2023 0702   Time Frame short-term goal (STG), 5 - 7 days at 07/05/2023 0702   Progress/Outcome goal revised this date at 07/05/2023 0702   Grooming Goal 2    [place] set-up required at 07/02/2023 0711   Time Frame long-term goal (LTG), 14 days or less at 07/02/2023 0711   Progress/Outcome goal ongoing at 07/05/2023 0702   Toileting Goal 1    [place] --  [Cl S] at 07/05/2023 0702   Time Frame short-term goal (STG), 5 - 7 days at 07/05/2023 0702   Progress/Outcome goal met, goal revised this date at 07/05/2023 0702   Toileting Goal 2    [place] supervision required at 07/02/2023 0711   Time Frame long-term goal (LTG), 14 days or less at 07/02/2023 0711   Progress/Outcome goal ongoing at 07/05/2023 0702

## 2023-07-09 NOTE — PROGRESS NOTES
Patient: Jennifer Sams  Location: [facility] [address]  MRN: [id number]  Today's date: 7/9/2023    Hospital Course  History of Present Illness  Jennifer is a 78 y.o. female admitted on 7/1/2023 with Acute ischemic left MCA stroke (CMS/HCC) [I63.512]. Principal problem is Acute ischemic left MCA stroke (CMS/HCC).    The patient is a 78-year-old  female with a history of AAA, heart block s/p cardiac pacemaker, glaucoma, hyperlipidemia, coronary artery disease, NSTEMI, who presented to [facility] on June 26 after she has not been seen by her friends for a few days.  Patient lives alone and when she was found by EMS she was confused and combative.  In the emergency department she was aphasic with right-sided weakness and hypertensive to the 190 systolic.  CT scan of the head revealed an acute infarct in left MCA with mild bleeding.  Echo showed an EF of 43% and a bubble study was negative.  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  Later this was transitioned to apixaban.  She was continued on statin for secondary prevention.  She was evaluated by speech therapy and cleared for a soft and bite-size diet with moderately thick liquids.    7/2/2023:  Diet SB6 with mildly thick liquids (MT2)  7/6/2023: Upgraded to EC7 with thin liquids    Past Medical History  Jennifre has a past medical history of AAA (abdominal aortic aneurysm), Arthritis, Elevated blood pressure reading without diagnosis of hypertension (4/19/2019), Epistaxis (1/6/2020), GERD (gastroesophageal reflux disease) (4/19/2019), Glaucoma (4/19/2019), Heart murmur, Hiatal hernia, History of hip replacement, total, right (4/19/2019), History of rheumatic fever as a child (4/19/2019), Hypercholesterolemia (4/19/2019), Ischemic cardiomyopathy (5/9/2019), Kidney cysts, Osteoarthritis of multiple joints (4/19/2019), Osteoporosis, Pacemaker  (12/9/2019), Raynaud's disease (4/19/2019), RSD (reflex sympathetic dystrophy) (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            Prior Living Environment    Flowsheet Row Most Recent Value   People in Home alone   Current Living Arrangements home   Home Accessibility --  [2 SH with 1 DARÍO]   Living Environment Comment Per chart review, pt lives alone in a 2SH with 1STE   Number of Stairs, Main Entrance 1   Number of Stairs, Within Home, Primary 12  [Full fight to second floor]          Prior Level of Function    Flowsheet Row Most Recent Value   Dominant Hand --  [Unable to determine 2/2 cognitive/communication deficits]   Ambulation independent   Transferring independent   Toileting independent   Bathing independent   Dressing independent   Eating independent   IADLs independent   Communication understands/communicates without difficulty   Swallowing swallows foods/liquids without difficulty   Baseline Diet/Method of Nutritional Intake no diet restrictions   Past History of Dysphagia no   Prior Level of Function Comment Per chart review: PMH SB6,  MT2,  No VFSS in EMR.  Pt seen by SLP at [place].   Assistive Device Currently Used at Home --  [Per chart review pt has RW at home - unclear if pt was using AD and pt unable to state PLOF at this time]           Recreational Therapy Evaluation and Treatment - 07/09/23 1101        Session Details    Document Type Daily Treatment/Progress Note     Mode of Treatment individual therapy;recreational therapy        Rec Time Calculation    Start Time 1100     Stop Time 1200     Time Calculation (min) 60 min        General Information    General Observations of Patient received in room;agreeable to tx        Coping/Community Reintegration    Observed Emotional State cooperative        Coping Strategies    Trust Relationship/Rapport care explained;choices provided;questions  answered;thoughts/feelings acknowledged;reassurance provided     Quality of Life Promotion (Coping Strategies) balance between activity/rest encouraged;independence in all possible areas promoted;social interaction/involvement encouraged     Comment Pt seen for session c focus on promoting sense of self+autonomy        Recreational Therapy Participation    Recreational Therapy Participation exercise/fitness;music     Exercise/Fitness walking/running   walk    Music listening to music;singing     Comment, Recreation Participation Pt amb c HHA on unit greater than 150'. Pt then listened to music. Pt provided c verbal choices of artist interests. Pt attempting to sing some words of familiar songs. Pt c improved observbale affect        Rec Therapy Clinical Impression    Daily Outcome Statement Pt seen for 60 RT session c focus on promoting sense of self+autonomy,amb and communication                      Recreational Therapy Goals    Flowsheet Row Most Recent Value   Community Goal 2    Activity: Community Participate in community reintegration c min A at 07/06/2023 0931   [place] minimum assist (75% or more patient effort) at 07/06/2023 0931   Time Frame long-term goal (LTG), 14 days or less at 07/06/2023 0931   Leisure Goal 1    Activity: Leisure Participate in RT tasks c focus on amb c min A 150' on even surfaces at 07/06/2023 0931   [place] minimum assist (75% or more patient effort) at 07/06/2023 0931   Time Frame short-term goal (STG), 3 - 5 days at 07/06/2023 0931   Leisure Goal 2    Activity: Leisure Participate in RT tasks c focus on amb c min A 150' on uneven surfaces at 07/06/2023 0931   [place] minimum assist (75% or more patient effort) at 07/06/2023 0931   Time Frame long-term goal (LTG), 14 days or less at 07/06/2023 0931   Additional Goal 1    Activity: Additional Goal Participate in RT tasks c focus on integration of RUE into BUE task at 07/06/2023 0931   [place] supervision  required at 07/06/2023 0931   Time Frame short-term goal (STG), 3 - 5 days at 07/06/2023 0931   Additional Goal 2    Activity: Additional Goal Participate in RT tasks c focus on GMC of RUE at 07/06/2023 0931   [place] supervision required at 07/06/2023 0931   Time Frame long-term goal (LTG), 14 days or less at 07/06/2023 0931

## 2023-07-09 NOTE — PROGRESS NOTES
Patient: Jennifer Sams  Location: [facility] [address]  MRN: [id number]  Today's date: 7/9/2023    History of Present Illness  Jennifer is a 78 y.o. female admitted on 7/1/2023 with Acute ischemic left MCA stroke (CMS/HCC) [I63.512]. Principal problem is Acute ischemic left MCA stroke (CMS/HCC).    The patient is a 78-year-old  female with a history of AAA, heart block s/p cardiac pacemaker, glaucoma, hyperlipidemia, coronary artery disease, NSTEMI, who presented to [facility] on June 26 after she has not been seen by her friends for a few days.  Patient lives alone and when she was found by EMS she was confused and combative.  In the emergency department she was aphasic with right-sided weakness and hypertensive to the 190 systolic.  CT scan of the head revealed an acute infarct in left MCA with mild bleeding.  Echo showed an EF of 43% and a bubble study was negative.  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  Later this was transitioned to apixaban.  She was continued on statin for secondary prevention.  She was evaluated by speech therapy and cleared for a soft and bite-size diet with moderately thick liquids.    7/2/2023:  Diet SB6 with mildly thick liquids (MT2)  7/6/2023: Upgraded to EC7 with thin liquids    Past Medical History  Jennifer has a past medical history of AAA (abdominal aortic aneurysm), Arthritis, Elevated blood pressure reading without diagnosis of hypertension (4/19/2019), Epistaxis (1/6/2020), GERD (gastroesophageal reflux disease) (4/19/2019), Glaucoma (4/19/2019), Heart murmur, Hiatal hernia, History of hip replacement, total, right (4/19/2019), History of rheumatic fever as a child (4/19/2019), Hypercholesterolemia (4/19/2019), Ischemic cardiomyopathy (5/9/2019), Kidney cysts, Osteoarthritis of multiple joints (4/19/2019), Osteoporosis, Pacemaker (12/9/2019),  Raynaud's disease (4/19/2019), RSD (reflex sympathetic dystrophy) (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        SLP Pain    Date/Time Pain Type Rating: Rest [facility]   07/09/23 1333 Pain Assessment 0 - no pain JLW   07/09/23 1359 Pain Reassessment 0 - no pain JLW          Prior Living Environment    Flowsheet Row Most Recent Value   People in Home alone   Current Living Arrangements home   Home Accessibility --  [2 SH with 1 DARÍO]   Living Environment Comment Per chart review, pt lives alone in a 2SH with 1STE   Number of Stairs, Main Entrance 1   Number of Stairs, Within Home, Primary 12  [Full fight to second floor]          Prior Level of Function    Flowsheet Row Most Recent Value   Dominant Hand --  [Unable to determine 2/2 cognitive/communication deficits]   Ambulation independent   Transferring independent   Toileting independent   Bathing independent   Dressing independent   Eating independent   IADLs independent   Communication understands/communicates without difficulty   Swallowing swallows foods/liquids without difficulty   Baseline Diet/Method of Nutritional Intake no diet restrictions   Past History of Dysphagia no   Prior Level of Function Comment Per chart review: PMH SB6,  MT2,  No VFSS in EMR.  Pt seen by SLP at [place].   Assistive Device Currently Used at Home --  [Per chart review pt has RW at home - unclear if pt was using AD and pt unable to state PLOF at this time]           IRF SLP Evaluation and Treatment - 07/09/23 1333        SLP Time Calculation    Start Time 1330     Stop Time 1400     Time Calculation (min) 30 min        Session Details    Document Type Daily Treatment/Progress Note     Mode of Treatment speech language pathology;individual therapy        General Information    General Observations of Patient received in bed, but amenable to going to ST office with prompting        Verbal Expression     "Comment, Intervention (Verbal Expression) Pt with several appropriate verbalizations:  Asked \"where?\" when ST came in to take her to therapy, asked \"who?\" when asked if she may have any visitors today. She commented \"nice\" when looking at ST's rings.  Targeted verbal expression via music.  My girl:  Pt produced approximations of Day and Say.  Accurately produced  \"way\" and \"my\" in unison ST and given particular cues for initial phoneme.   Do Re Mi--Pt produced approximaton of \"deer\" (ear).  Over time, pt became visibly frustrated and began to shut down.  Endorsed that \"yes\" she was feel sad.        Daily Progress Summary (SLP)    Daily Outcome Statement Pt with excellent use of gestural communication to augment verbal communication.  Pt required initial prompting to participate, but put forth good efffort once she agreed to session.  Endorses feeling \"down\" when asked and suspect mood may be reducing pt's frustration tolerance and is potentionally hindering her progress.  Cont POC per primary SLP.                     IRF SLP Goals    Flowsheet Row Most Recent Value   Auditory Comprehension Goal 1    Auditory Comprehension Goal 1 pt will complete basic Aud comprehension tasks (simple yes/no 75%),  1 step commands 50% with max cues,  match word heard to picture f/o 2 50% max cues at 07/02/2023 1001   Time Frame short-term goal (STG), 1 week at 07/02/2023 1001   Progress/Outcome goal ongoing at 07/04/2023 1510   Auditory Comprehension Goal 2    Auditory Comprehension Goal 2 Pt will understand mod-complex level y/n questions 90% min cues,  2 step commands 75% min-mod cues,  at 07/02/2023 1001   Time Frame long-term goal (LTG), 4 weeks at 07/02/2023 1001   Progress/Outcome goal ongoing at 07/04/2023 1510   Verbal Expression Goal 1    Verbal Expression Goal 1 Pt will complete basic VE tasks (name. automatics, simple opposites, naming) with 20% accuracy and max cues. at 07/02/2023 1001   Time Frame short-term goal (STG), 1 " week at 07/02/2023 1001   Progress/Outcome goal ongoing at 07/04/2023 1510   Verbal Expression Goal 2    Verbal Expression Goal 2 Pt will complete Verbal expression tasks (basic-mod) with 50-75% accuracy and min-mod cues.  Pt will imitate vowels 50% max cues,  at 07/02/2023 1001   Time Frame long-term goal (LTG), 4 weeks at 07/02/2023 1001   Progress/Outcome goal ongoing at 07/04/2023 1510   Motor Speech/Voice Goal 1    Motor Speech/Voice Goal 1 Repetition of vowels, CV/VC and CVC targets with max cues and models for 60% acc. at 07/04/2023 1510   Time Frame short-term goal (STG), 2 weeks at 07/04/2023 1510   Progress/Outcome goal ongoing at 07/04/2023 1510   Motor Speech/Voice Goal 2    Motor Speech/Voice Goal 2 Repetition of vowels, CV/VC and CVC targets with min cues and models for 90% acc. at 07/04/2023 1510   Time Frame long-term goal (LTG), 4 weeks at 07/04/2023 1510   Progress/Outcome goal ongoing at 07/04/2023 1510   Reading Comprehension Goal 1    Reading Comprehension Goal 1 Pt will match word to picture field of 2 with 50% accuracy and mod-max cues,  Pt will read single functional words with 20% accuracy max cues at 07/02/2023 1001   Time Frame short-term goal (STG), 1 week at 07/02/2023 1001   Progress/Outcome goal no longer appropriate at 07/04/2023 1510   Reading Comprehension Goal 2    Reading Comprehension Goal 2 Pt will read simple sentences and comprehend simple sentences with 75% accuracy min-mod cues at 07/02/2023 1001   Time Frame long-term goal (LTG) at 07/02/2023 1001   Progress/Outcome goal no longer appropriate at 07/04/2023 1510   Written Expression Goal 1    Written Expression Goal 1 Pt will copy first name, shapes, letters and 3-4 letter words with 80% min cues,  pt will copy letters, name shapes with 50% accuracy and mod cues. at 07/02/2023 1001   Time Frame short-term goal (STG), 1 week at 07/02/2023 1001   Progress/Outcome goal no longer appropriate at 07/04/2023 1510   Written  Expression Goal 2    Written Expression Goal 2 Pt will write first and last name,  single words 90% accuracy at 07/02/2023 1001   Progress/Outcome goal no longer appropriate at 07/04/2023 1510   Oral Nutrition/Hydration Goal 1    Activity effective/safe, use of swallowing techniques, management of texture/viscosity, other (see comments)  [SB6,  MT2.  Trials of thins and various textures by speech with min s/s of aspiration] at 07/02/2023 1001   Time Frame short-term goal (STG), 14 days or less at 07/02/2023 1001   Progress/Outcome goal ongoing at 07/04/2023 1510   Oral Nutrition/Hydration Goal 2    Activity effective/safe, oral nutrition/hydration, use of swallowing techniques, management of texture/viscosity, other (see comments)  [regular and thins] at 07/02/2023 1001   Time Frame long-term goal (LTG), 4 weeks at 07/02/2023 1001   Progress/Outcome goal ongoing at 07/04/2023 1510   Oral Motor Exercise Goal 1    Activity perform oral motor strengthening exercises, facial/cheek, lip, tongue, strength/ROM, mastication/bolus manipulation, 5-10 times per session, other (see comments)  [use mirror/visual model] at 07/02/2023 1001   [place] with 1:1 supervision/constant cues at 07/02/2023 1001   Time Frame short-term goal (STG), 14 days or less at 07/02/2023 1001   Progress/Outcome goal ongoing at 07/04/2023 1510   Oral Motor Exercise Goal 2    Activity perform oral motor strengthening exercises, facial/cheek, lip, tongue, strength/ROM, mastication/bolus manipulation, other (see comments)  [2-3 times a day sets of 10-15] at 07/02/2023 1001   [place] with minimal cues (75-90% accuracy) at 07/02/2023 1001   Time Frame long-term goal (LTG), 4 weeks at 07/02/2023 1001   Progress/Outcome goal ongoing at 07/04/2023 1510

## 2023-07-09 NOTE — PLAN OF CARE
"Plan of Care Review  Plan of Care Reviewed With: patient  Progress: improving  Outcome Evaluation: Pt alert and awake, pleasant. 1:1 Sitter around the clock. Pt ambulating in room and around unit with therapy and/or staff. Poor appetite for breakfast and lunch. Moderate po fluid intake with encouragement. Discussed food options with  as he was inquiring about bringing in favorite food options from home or pts favorite restaurant. Encouraged him to clear food with nursing staff or speech prior to offering it to his wife as she is on an \"Easy to chew\" diet. Continent of urine.  "

## 2023-07-09 NOTE — PLAN OF CARE
Plan of Care Review  Plan of Care Reviewed With: patient  Progress: improving  Outcome Evaluation: Pt alert but irritable. Continent of bladder. Continue with 1:1 sitter. All fall and safety precaution maintained.

## 2023-07-10 ENCOUNTER — APPOINTMENT (INPATIENT)
Dept: SPEECH THERAPY | Facility: REHABILITATION | Age: 78
DRG: 057 | End: 2023-07-10
Payer: MEDICARE

## 2023-07-10 ENCOUNTER — APPOINTMENT (INPATIENT)
Dept: OCCUPATIONAL THERAPY | Facility: REHABILITATION | Age: 78
DRG: 057 | End: 2023-07-10
Payer: MEDICARE

## 2023-07-10 ENCOUNTER — APPOINTMENT (INPATIENT)
Dept: PHYSICAL THERAPY | Facility: REHABILITATION | Age: 78
DRG: 057 | End: 2023-07-10
Payer: MEDICARE

## 2023-07-10 ENCOUNTER — APPOINTMENT (OUTPATIENT)
Dept: PSYCHOLOGY | Facility: CLINIC | Age: 78
End: 2023-07-10
Payer: MEDICARE

## 2023-07-10 LAB
ANION GAP SERPL CALC-SCNC: 8 MEQ/L (ref 3–15)
BASOPHILS # BLD: 0.03 K/UL (ref 0.01–0.1)
BASOPHILS NFR BLD: 0.6 %
BUN SERPL-MCNC: 11 MG/DL (ref 7–25)
CALCIUM SERPL-MCNC: 8.9 MG/DL (ref 8.6–10.3)
CHLORIDE SERPL-SCNC: 105 MEQ/L (ref 98–107)
CO2 SERPL-SCNC: 27 MEQ/L (ref 21–31)
CREAT SERPL-MCNC: 0.8 MG/DL (ref 0.6–1.2)
DIFFERENTIAL METHOD BLD: NORMAL
EOSINOPHIL # BLD: 0.11 K/UL (ref 0.04–0.36)
EOSINOPHIL NFR BLD: 2.1 %
ERYTHROCYTE [DISTWIDTH] IN BLOOD BY AUTOMATED COUNT: 13.2 % (ref 11.7–14.4)
GFR SERPL CREATININE-BSD FRML MDRD: >60 ML/MIN/1.73M*2
GLUCOSE SERPL-MCNC: 90 MG/DL (ref 70–99)
HCT VFR BLDCO AUTO: 45 % (ref 35–45)
HGB BLD-MCNC: 15 G/DL (ref 11.8–15.7)
IMM GRANULOCYTES # BLD AUTO: 0.01 K/UL (ref 0–0.08)
IMM GRANULOCYTES NFR BLD AUTO: 0.2 %
LYMPHOCYTES # BLD: 1.28 K/UL (ref 1.2–3.5)
LYMPHOCYTES NFR BLD: 25 %
MCH RBC QN AUTO: 31.3 PG (ref 28–33.2)
MCHC RBC AUTO-ENTMCNC: 33.3 G/DL (ref 32.2–35.5)
MCV RBC AUTO: 93.8 FL (ref 83–98)
MONOCYTES # BLD: 0.59 K/UL (ref 0.28–0.8)
MONOCYTES NFR BLD: 11.5 %
NEUTROPHILS # BLD: 3.1 K/UL (ref 1.7–7)
NEUTS SEG NFR BLD: 60.6 %
NRBC BLD-RTO: 0 %
PDW BLD AUTO: 9.4 FL (ref 9.4–12.3)
PLATELET # BLD AUTO: 204 K/UL (ref 150–369)
POTASSIUM SERPL-SCNC: 4.2 MEQ/L (ref 3.5–5.1)
RBC # BLD AUTO: 4.8 M/UL (ref 3.93–5.22)
SODIUM SERPL-SCNC: 140 MEQ/L (ref 136–145)
WBC # BLD AUTO: 5.12 K/UL (ref 3.8–10.5)

## 2023-07-10 PROCEDURE — 97116 GAIT TRAINING THERAPY: CPT | Mod: GP

## 2023-07-10 PROCEDURE — 36415 COLL VENOUS BLD VENIPUNCTURE: CPT | Performed by: PHYSICAL MEDICINE & REHABILITATION

## 2023-07-10 PROCEDURE — 97110 THERAPEUTIC EXERCISES: CPT | Mod: GO,59

## 2023-07-10 PROCEDURE — 63700000 HC SELF-ADMINISTRABLE DRUG: Performed by: STUDENT IN AN ORGANIZED HEALTH CARE EDUCATION/TRAINING PROGRAM

## 2023-07-10 PROCEDURE — 80048 BASIC METABOLIC PNL TOTAL CA: CPT | Performed by: PHYSICAL MEDICINE & REHABILITATION

## 2023-07-10 PROCEDURE — 97530 THERAPEUTIC ACTIVITIES: CPT | Mod: GP,59

## 2023-07-10 PROCEDURE — 12800000 HC ROOM AND CARE SEMIPRIVATE REHAB

## 2023-07-10 PROCEDURE — 63700000 HC SELF-ADMINISTRABLE DRUG: Performed by: HOSPITALIST

## 2023-07-10 PROCEDURE — 90832 PSYTX W PT 30 MINUTES: CPT

## 2023-07-10 PROCEDURE — 92507 TX SP LANG VOICE COMM INDIV: CPT | Mod: GN

## 2023-07-10 PROCEDURE — 97530 THERAPEUTIC ACTIVITIES: CPT | Mod: GO,59

## 2023-07-10 PROCEDURE — 97110 THERAPEUTIC EXERCISES: CPT | Mod: GP,59

## 2023-07-10 PROCEDURE — 85025 COMPLETE CBC W/AUTO DIFF WBC: CPT | Performed by: PHYSICAL MEDICINE & REHABILITATION

## 2023-07-10 RX ADMIN — APIXABAN 5 MG: 5 TABLET, FILM COATED ORAL at 09:35

## 2023-07-10 RX ADMIN — Medication 3 MG: at 20:24

## 2023-07-10 RX ADMIN — CYCLOSPORINE 1 DROP: 0.5 EMULSION OPHTHALMIC at 20:23

## 2023-07-10 RX ADMIN — ROSUVASTATIN CALCIUM 20 MG: 20 TABLET, FILM COATED ORAL at 20:24

## 2023-07-10 RX ADMIN — TRAZODONE HYDROCHLORIDE 25 MG: 50 TABLET, FILM COATED ORAL at 20:24

## 2023-07-10 RX ADMIN — CYCLOSPORINE 1 DROP: 0.5 EMULSION OPHTHALMIC at 09:35

## 2023-07-10 RX ADMIN — APIXABAN 5 MG: 5 TABLET, FILM COATED ORAL at 20:24

## 2023-07-10 ASSESSMENT — COGNITIVE AND FUNCTIONAL STATUS - GENERAL
REMOTE MEMORY: UNABLE TO ASSESS
AROUSAL LEVEL: ALERT
SPEECH: APHASIC
LIBIDO: NON-CONTRIBUTORY
AFFECT: FULL RANGE
THOUGHT_PROCESS: UNABLE TO ASSESS
PERCEPTUAL FUNCTION: VISUAL IMPAIRMENT
EST. PREMORBID INTELLIGENCE: AVERAGE
MOOD: DEPRESSED;ANXIOUS;MOTIVATED
SLEEP_WAKE_CYCLE: DECREASED
THOUGHT_CONTENT: UNABLE TO ASSESS
INSIGHT: IMPAIRED, MODERATELY
APPETITE: DECREASED
EYE_CONTACT: WNL
PSYCHOMOTOR FUNCTIONING: WNL
IMPULSE CONTROL: IMPAIRED, MODERATELY
RECENT MEMORY: UNABLE TO ASSESS
APPEARANCE: WELL GROOMED
ORIENTATION: ORIENTED TO PERSON
ATTENTION: UNABLE TO ASSESS
CONCENTRATION: UNABLE TO ASSESS

## 2023-07-10 NOTE — PROGRESS NOTES
Patient: Jennifer Sams  Location: [facility] [address]  MRN: [id number]  Today's date: 7/10/2023    History of Present Illness  Jennifer is a 78 y.o. female admitted on 7/1/2023 with Acute ischemic left MCA stroke (CMS/HCC) [I63.512]. Principal problem is Acute ischemic left MCA stroke (CMS/HCC).    The patient is a 78-year-old  female with a history of AAA, heart block s/p cardiac pacemaker, glaucoma, hyperlipidemia, coronary artery disease, NSTEMI, who presented to [facility] on June 26 after she has not been seen by her friends for a few days.  Patient lives alone and when she was found by EMS she was confused and combative.  In the emergency department she was aphasic with right-sided weakness and hypertensive to the 190 systolic.  CT scan of the head revealed an acute infarct in left MCA with mild bleeding.  Echo showed an EF of 43% and a bubble study was negative.  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  Later this was transitioned to apixaban.  She was continued on statin for secondary prevention.  She was evaluated by speech therapy and cleared for a soft and bite-size diet with moderately thick liquids.    7/2/2023:  Diet SB6 with mildly thick liquids (MT2)  7/6/2023: Upgraded to EC7 with thin liquids    Past Medical History  Jennifer has a past medical history of AAA (abdominal aortic aneurysm), Arthritis, Elevated blood pressure reading without diagnosis of hypertension (4/19/2019), Epistaxis (1/6/2020), GERD (gastroesophageal reflux disease) (4/19/2019), Glaucoma (4/19/2019), Heart murmur, Hiatal hernia, History of hip replacement, total, right (4/19/2019), History of rheumatic fever as a child (4/19/2019), Hypercholesterolemia (4/19/2019), Ischemic cardiomyopathy (5/9/2019), Kidney cysts, Osteoarthritis of multiple joints (4/19/2019), Osteoporosis, Pacemaker (12/9/2019),  Raynaud's disease (4/19/2019), RSD (reflex sympathetic dystrophy) (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        OT Vitals    Date/Time Pulse HR Source BP BP Location BP Method Pt Position [facility]   07/10/23 1105 80 Left Radial 140/80 Left upper arm Manual Lying AK      OT Pain    Date/Time Pain Type Rating: Rest Rating: Activity [facility]   07/10/23 1105 Pain Assessment 0 - no pain 0 - no pain [place]   07/10/23 1155 Pain Reassessment 0 - no pain 0 - no pain AK          Prior Living Environment    Flowsheet Row Most Recent Value   People in Home alone   Current Living Arrangements home   Home Accessibility --  [2 SH with 1 DARÍO]   Living Environment Comment Per chart review, pt lives alone in a 2SH with 1STE   Number of Stairs, Main Entrance 1   Number of Stairs, Within Home, Primary 12  [Full fight to second floor]          Prior Level of Function    Flowsheet Row Most Recent Value   Dominant Hand --  [Unable to determine 2/2 cognitive/communication deficits]   Ambulation independent   Transferring independent   Toileting independent   Bathing independent   Dressing independent   Eating independent   IADLs independent   Communication understands/communicates without difficulty   Swallowing swallows foods/liquids without difficulty   Baseline Diet/Method of Nutritional Intake no diet restrictions   Past History of Dysphagia no   Prior Level of Function Comment Per chart review: PMH SB6,  MT2,  No VFSS in EMR.  Pt seen by SLP at [place].   Assistive Device Currently Used at Home --  [Per chart review pt has RW at home - unclear if pt was using AD and pt unable to state PLOF at this time]          Occupational Profile    Flowsheet Row Most Recent Value   Occupational History/Life Experiences Will need to clarify PLOF/driving status. Pt unable to state 2/2 cognitive/communication deficits.   Environmental Supports and Barriers Per chart review, pt  lives alone but has supportive friends.   Patient Goals Pt unable to state goal 2/2 communication deficits.           IRF OT Evaluation and Treatment - 07/10/23 1104        OT Time Calculation    Start Time 1100     Stop Time 1200     Time Calculation (min) 60 min        Session Details    Document Type Daily Treatment/Progress Note     Mode of Treatment occupational therapy;individual therapy        General Information    General Observations of Patient Pt received lying supine in bed, awake. Direct handoff pre/post session to 1:1.        Mobility Belt    Mobility Belt Used for All Out of Bed Activity no     Reason Mobility Belt Not Used patient refused        Wrist/Hand Orthosis Management    Fabrication Comment (Wrist/Hand Orthosis) R pre-velia wrist cock-up slint donned t/o.        Cognition/Psychosocial    Comment, Cognition Pt c increased frustration and overall decreased motivation/participation in therapy this date. Pt requiring Max enouragement to transfer OOB and participate, eventually agreeable to amb within unit hallways to get orange juice. Pt emotionally labile t/o, tearful 2/2 increased frustration and then more cheeful when saying hello to other staff members in the hallway. OT providing therapeutic listening and empathetic use of self t/o.        Bed Mobility    Comment OT: Supine <> sit EOB c S.        Sit to Stand Transfer    [place], Sit to Stand Transfer close supervision     Safety/Cues impulsivity     Assistive Device none     Comment From EOB and chair with arms.        Stand to Sit Transfer    [place], Stand to Sit Transfer close supervision     Safety/Cues impulsivity     Assistive Device none     Comment To EOB and chair with arms.        Stand Pivot Transfer    [place], Stand Pivot/Stand Step Transfer close supervision     Safety/Cues impulsivity     Assistive Device none     Comment Via amb approach c Cl S for balance and decreased safety awareness.         Impairments/Safety Issues    Comment, Safety Issues/Impairments OT: Medium HHD completed within unit hallways c Steadying A/Cl S s AD.        Self-Feeding    [place] set up;supervision     Position unsupported sitting     Setup Assistance prepare tray/open items     Adaptive Equipment none     Comment Pt drank 75% of an orange juice during session. OT encouraging fluids/intake t/o. Pt able to bring cup to mouth c L UE without spillage. Pt required assist to open container 2/2 R UE decreaed distal ROM.        Daily Progress Summary (OT)    Daily Outcome Statement Pt c decreased tolerance to therapy this date in comparision to previous sessions. Pt presenting c increased frustration and tearfulness at start of session. With increased encouragement, pt willing to participate in medium distance HHM/functional transfers. At end of session, pt returned to lying in bed with direct handoff to 1:1. Continue OT POC.                           IRF OT Goals    Flowsheet Row Most Recent Value   Transfer Goal 1    Activity/Assistive Device toilet at 07/02/2023 0711   [place] --  [Cl S] at 07/02/2023 0711   Time Frame short-term goal (STG), 5 - 7 days at 07/05/2023 0702   Progress/Outcome goal ongoing, goal revised this date at 07/05/2023 0702   Transfer Goal 2    Activity/Assistive Device toilet at 07/02/2023 0711   [place] supervision required at 07/02/2023 0711   Time Frame long-term goal (LTG), 14 days or less at 07/02/2023 0711   Progress/Outcome goal ongoing at 07/05/2023 0702   Transfer Goal 3    Activity/Assistive Device shower at 07/02/2023 0711   [place] --  [Cl S] at 07/02/2023 0711   Time Frame short-term goal (STG), 5 - 7 days at 07/02/2023 0711   Progress/Outcome goal ongoing, goal revised this date at 07/05/2023 0702   Transfer Goal 4    Activity/Assistive Device shower at 07/02/2023 0711   [place] supervision required at 07/02/2023 0711   Time Frame long-term goal (LTG), 14 days or less at  07/02/2023 0711   Progress/Outcome goal ongoing at 07/05/2023 0702   Bathing Goal 1    [place] --  [Steadying A] at 07/02/2023 0711   Time Frame short-term goal (STG), 5 - 7 days at 07/05/2023 0702   Progress/Outcome goal ongoing, goal revised this date at 07/05/2023 0702   Bathing Goal 2    [place] supervision required at 07/02/2023 0711   Time Frame long-term goal (LTG), 14 days or less at 07/02/2023 0711   Progress/Outcome goal ongoing at 07/05/2023 0702   UB Dressing Goal 1    [place] --  [Cl S] at 07/05/2023 0702   Time Frame short-term goal (STG), 5 - 7 days at 07/05/2023 0702   UB Dressing Goal 2    [place] set-up required at 07/02/2023 0711   Time Frame long-term goal (LTG), 14 days or less at 07/02/2023 0711   Progress/Outcome goal ongoing at 07/05/2023 0702   LB Dressing Goal 1    [place] --  [Cl S] at 07/05/2023 0702   Time Frame short-term goal (STG), 5 - 7 days at 07/05/2023 0702   LB Dressing Goal 2    [place] supervision required at 07/02/2023 0711   Time Frame long-term goal (LTG), 14 days or less at 07/02/2023 0711   Progress/Outcome goal ongoing at 07/05/2023 0702   Grooming Goal 1    [place] --  [Steadying A] at 07/05/2023 0702   Time Frame short-term goal (STG), 5 - 7 days at 07/05/2023 0702   Progress/Outcome goal revised this date at 07/05/2023 0702   Grooming Goal 2    [place] set-up required at 07/02/2023 0711   Time Frame long-term goal (LTG), 14 days or less at 07/02/2023 0711   Progress/Outcome goal ongoing at 07/05/2023 0702   Toileting Goal 1    [place] --  [Cl S] at 07/05/2023 0702   Time Frame short-term goal (STG), 5 - 7 days at 07/05/2023 0702   Progress/Outcome goal met, goal revised this date at 07/05/2023 0702   Toileting Goal 2    [place] supervision required at 07/02/2023 0711   Time Frame long-term goal (LTG), 14 days or less at 07/02/2023 0711   Progress/Outcome goal ongoing at 07/05/2023 0702

## 2023-07-10 NOTE — PROGRESS NOTES
Patient: Jennifer Sams  Location: [facility] [address]  MRN: [id number]  Today's date: 7/10/2023    History of Present Illness  Jennifer is a 78 y.o. female admitted on 7/1/2023 with Acute ischemic left MCA stroke (CMS/HCC) [I63.512]. Principal problem is Acute ischemic left MCA stroke (CMS/HCC).    The patient is a 78-year-old  female with a history of AAA, heart block s/p cardiac pacemaker, glaucoma, hyperlipidemia, coronary artery disease, NSTEMI, who presented to [facility] on June 26 after she has not been seen by her friends for a few days.  Patient lives alone and when she was found by EMS she was confused and combative.  In the emergency department she was aphasic with right-sided weakness and hypertensive to the 190 systolic.  CT scan of the head revealed an acute infarct in left MCA with mild bleeding.  Echo showed an EF of 43% and a bubble study was negative.  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  Later this was transitioned to apixaban.  She was continued on statin for secondary prevention.  She was evaluated by speech therapy and cleared for a soft and bite-size diet with moderately thick liquids.    7/2/2023:  Diet SB6 with mildly thick liquids (MT2)  7/6/2023: Upgraded to EC7 with thin liquids    Past Medical History  Jennifer has a past medical history of AAA (abdominal aortic aneurysm), Arthritis, Elevated blood pressure reading without diagnosis of hypertension (4/19/2019), Epistaxis (1/6/2020), GERD (gastroesophageal reflux disease) (4/19/2019), Glaucoma (4/19/2019), Heart murmur, Hiatal hernia, History of hip replacement, total, right (4/19/2019), History of rheumatic fever as a child (4/19/2019), Hypercholesterolemia (4/19/2019), Ischemic cardiomyopathy (5/9/2019), Kidney cysts, Osteoarthritis of multiple joints (4/19/2019), Osteoporosis, Pacemaker (12/9/2019),  Raynaud's disease (4/19/2019), RSD (reflex sympathetic dystrophy) (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        PT Vitals    Date/Time Pulse HR Source BP BP Location BP Method Pt Position [facility]   07/10/23 1408 69 Left Radial 114/74 Left upper arm Manual Sitting BS   07/10/23 1436 -- -- 136/82 Left upper arm Manual Lying BS   07/10/23 1454 87 Left Radial 138/82 Left upper arm Manual Lying BS      PT Pain    Date/Time Pain Type Rating: Rest Rating: Activity [facility]   07/10/23 1408 Pain Assessment 0 - no pain --    07/10/23 1454 Pain Reassessment 0 - no pain 0 - no pain BS          Prior Living Environment    Flowsheet Row Most Recent Value   People in Home alone   Current Living Arrangements home   Home Accessibility --  [2 SH with 1 DARÍO]   Living Environment Comment Per chart review, pt lives alone in a 2SH with 1STE   Number of Stairs, Main Entrance 1   Number of Stairs, Within Home, Primary 12  [Full fight to second floor]          Prior Level of Function    Flowsheet Row Most Recent Value   Dominant Hand --  [Unable to determine 2/2 cognitive/communication deficits]   Ambulation independent   Transferring independent   Toileting independent   Bathing independent   Dressing independent   Eating independent   IADLs independent   Communication understands/communicates without difficulty   Swallowing swallows foods/liquids without difficulty   Baseline Diet/Method of Nutritional Intake no diet restrictions   Past History of Dysphagia no   Prior Level of Function Comment Per chart review: PMH SB6,  MT2,  No VFSS in EMR.  Pt seen by SLP at [place].   Assistive Device Currently Used at Home --  [Per chart review pt has RW at home - unclear if pt was using AD and pt unable to state PLOF at this time]           IRF PT Evaluation and Treatment - 07/10/23 1410        PT Time Calculation    Start Time 1400     Stop Time 1500     Time  Calculation (min) 60 min        Session Details    Document Type Daily Treatment/Progress Note     Mode of Treatment physical therapy;individual therapy        General Information    General Observations of Patient pt received in bed with 1:1 at bedside, pt appears fatigued but agreeable to OOB tx        Mobility Belt    Mobility Belt Used for All Out of Bed Activity no     Reason Mobility Belt Not Used patient refused        Wrist/Hand Orthosis Management    Fabrication Comment (Wrist/Hand Orthosis) R pre-velia wrist cock-up slint donned t/o.        Bed Mobility    [place], Supine to Sit supervision     [place], Sit to Supine supervision     Comment in hospital bed, multiple times throughout session        Sit to Stand Transfer    [place], Sit to Stand Transfer close supervision     Safety/Cues impulsivity     Assistive Device none     Comment from hospital bed and EOM        Stand to Sit Transfer    [place], Stand to Sit Transfer close supervision     Safety/Cues impulsivity     Assistive Device none     Comment to wc/mat        Stand Pivot Transfer    [place], Stand Pivot/Stand Step Transfer close supervision     Safety/Cues impulsivity     Assistive Device none     Comment via amb approach        Gait Training    [place], Gait close supervision     Safety/Cues impulsivity     Assistive Device none     Distance in Feet 500 feet     Pattern step-through     Deviations/Abnormal Patterns gait speed decreased;step length decreased;argenis decreased     Comment (Gait/Stairs) varying gait speeds and pt easily distracted by staff or looking around unit while ambulation but no LOB. Pt at times interlocking UEs with PT however pt not providing any assistance for balance. Attempted second walk post rest break however declining after feeling tired after walking to doorway in room.        Curb Negotiation    [place] touching/steadying assist     Assistive Device none     Curb Height 6 inches   "   Comment up 2\" + 6\" curb step, cl S-steadying A at pelvis for balance with excessive postural sway noted. Pt deferred going down post steps due to fatigue        Rough/Uneven Surface Gait Skills    [place] touching/steadying assist     Assistive Device none     Distance in Feet 5 feet     Comment down 5' indoor ramp with steadying A at pelvis for balance. Pt deferred going up post steps due to increased fatigue        Stairs Training    [place], Stairs close supervision     Safety/Cues impulsivity     Assistive Device railing     Handrail Location (Stairs) left side (ascending);left side (descending)     Number of Stairs 16     Stair Height 7 inches     Ascending Stairs Technique step-over-step     Descending Stairs Technique step-over-step     Comment 2x8 steps only due to increased fatigue        Balance    Comment, Balance pt attempts to don shoes in standing with mild LOB to right corrected with steadying A and pt holding onto PT's arm. Pt at times bending forward while standing in hallways at cl S level        Lower Extremity (Therapeutic Exercise)    Comment attempted structured LE/core exercises while on mat however pt fatigued quickly and unable to complete >5 reps of SLR and bridging        Aerobic Exercise    Type recumbent elliptical      Time Performed 5     Comment nustep BLEs only, multiple self selected rest breaks. Avg SPM 65        Therapeutic Interventions    Comment, Therapeutic Intervention therapeutic supine rest break in bed provided during session for fatigue management, x 10mins        Daily Progress Summary (PT)    Daily Outcome Statement Session limited by fatigue. Pt able to complete mobility at mostly cl S level with steadying A for curb step only. Pt benefited from supine rest breaks throughout session, BP stable throughout. Direct handoff to 1:1 at end of session                           IRF PT Goals    Flowsheet Row Most Recent Value   Bed Mobility Goal 1  "   Activity/Assistive Device bed mobility activities, all at 07/02/2023 0830   [place] supervision required at 07/02/2023 0830   Time Frame 1 week, short-term goal (STG) at 07/05/2023 0835   Strategies/Barriers not reassessed at 07/05/2023 0835   Progress/Outcome goal ongoing at 07/05/2023 0835   Bed Mobility Goal 2    Activity/Assistive Device bed mobility activities, all at 07/02/2023 0830   [place] modified independence at 07/02/2023 0830   Time Frame 2 weeks, long-term goal (LTG) at 07/05/2023 0835   Progress/Outcome goal ongoing at 07/05/2023 0835   Transfer Goal 1    Activity/Assistive Device sit-to-stand/stand-to-sit, bed-to-chair/chair-to-bed, stand pivot at 07/02/2023 0830   [place] supervision required at 07/02/2023 0830   Time Frame 1 week, short-term goal (STG) at 07/05/2023 0835   Strategies/Barriers steadying assist for safety at 07/05/2023 0835   Progress/Outcome goal ongoing at 07/05/2023 0835   Transfer Goal 2    Activity/Assistive Device sit-to-stand/stand-to-sit, bed-to-chair/chair-to-bed, stand pivot at 07/02/2023 0830   [place] modified independence at 07/02/2023 0830   Time Frame 2 weeks, long-term goal (LTG) at 07/05/2023 0835   Progress/Outcome goal ongoing at 07/05/2023 0835   Gait/Walking Locomotion Goal 1    Activity/Assistive Device gait (walking locomotion) at 07/02/2023 0830   Distance 250 feet at 07/02/2023 0830   [place] supervision required at 07/02/2023 0830   Time Frame 1 week, short-term goal (STG) at 07/05/2023 0835   Strategies/Barriers touching assist at 07/05/2023 0835   Progress/Outcome goal ongoing at 07/05/2023 0835   Gait/Walking Locomotion Goal 2    Activity/Assistive Device gait (walking locomotion) at 07/02/2023 0830   Distance 500 feet at 07/02/2023 0830   [place] supervision required at 07/02/2023 0830   Time Frame 2 weeks, long-term goal (LTG) at 07/05/2023 0835   Progress/Outcome goal ongoing at 07/05/2023 0835   Wheelchair Locomotion  Goal 1    Activity wheelchair mobility skills, all at 07/02/2023 0830   Assistive Device manual, lightweight at 07/02/2023 0830   Distance 50 feet at 07/02/2023 0830   [place] minimum assist (75% or more patient effort) at 07/02/2023 0830   Time Frame 1 week, short-term goal (STG) at 07/05/2023 0835   Strategies/Barriers lower w/c ordered at 07/05/2023 0835   Progress/Outcome goal no longer appropriate at 07/05/2023 0835   Wheelchair Locomotion Goal 2    Activity wheelchair mobility skills, all at 07/02/2023 0830   Assistive Device manual, lightweight at 07/02/2023 0830   Distance 200 feet at 07/05/2023 0835   [place] modified independence at 07/05/2023 0835   Time Frame 2 weeks, long-term goal (LTG) at 07/05/2023 0835   Progress/Outcome goal revised this date at 07/05/2023 0835   Stairs Goal 1    Activity/Assistive Device stairs, all skills at 07/02/2023 0830   Number of Stairs 16 at 07/02/2023 0830   [place] supervision required at 07/02/2023 0830   Time Frame 1 week, short-term goal (STG) at 07/05/2023 0835   Strategies/Barriers touching assist at 07/05/2023 0835   Progress/Outcome goal ongoing at 07/05/2023 0835   Stairs Goal 2    Activity/Assistive Device stairs, all skills at 07/02/2023 0830   Number of Stairs 24 at 07/02/2023 0830   [place] supervision required at 07/02/2023 0830   Time Frame 2 weeks, long-term goal (LTG) at 07/05/2023 0835   Progress/Outcome goal ongoing at 07/05/2023 0835

## 2023-07-10 NOTE — PROGRESS NOTES
Patient: Jennifer Sams  Location: [facility] [address]  MRN: [id number]  Today's date: 7/10/2023    History of Present Illness  Jennifer is a 78 y.o. female admitted on 7/1/2023 with Acute ischemic left MCA stroke (CMS/HCC) [I63.512]. Principal problem is Acute ischemic left MCA stroke (CMS/HCC).    The patient is a 78-year-old  female with a history of AAA, heart block s/p cardiac pacemaker, glaucoma, hyperlipidemia, coronary artery disease, NSTEMI, who presented to [facility] on June 26 after she has not been seen by her friends for a few days.  Patient lives alone and when she was found by EMS she was confused and combative.  In the emergency department she was aphasic with right-sided weakness and hypertensive to the 190 systolic.  CT scan of the head revealed an acute infarct in left MCA with mild bleeding.  Echo showed an EF of 43% and a bubble study was negative.  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  Later this was transitioned to apixaban.  She was continued on statin for secondary prevention.  She was evaluated by speech therapy and cleared for a soft and bite-size diet with moderately thick liquids.    7/2/2023:  Diet SB6 with mildly thick liquids (MT2)  7/6/2023: Upgraded to EC7 with thin liquids    Past Medical History  Jennifer has a past medical history of AAA (abdominal aortic aneurysm), Arthritis, Elevated blood pressure reading without diagnosis of hypertension (4/19/2019), Epistaxis (1/6/2020), GERD (gastroesophageal reflux disease) (4/19/2019), Glaucoma (4/19/2019), Heart murmur, Hiatal hernia, History of hip replacement, total, right (4/19/2019), History of rheumatic fever as a child (4/19/2019), Hypercholesterolemia (4/19/2019), Ischemic cardiomyopathy (5/9/2019), Kidney cysts, Osteoarthritis of multiple joints (4/19/2019), Osteoporosis, Pacemaker (12/9/2019),  Raynaud's disease (4/19/2019), RSD (reflex sympathetic dystrophy) (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        OT Vitals    Date/Time Pulse HR Source BP BP Location BP Method Pt Position [facility]   07/10/23 1105 80 Left Radial 140/80 Left upper arm Manual Lying [place]      OT Pain    Date/Time Pain Type Rating: Rest Rating: Activity [facility]   07/10/23 1105 Pain Assessment 0 - no pain 0 - no pain [place]   07/10/23 1155 Pain Reassessment 0 - no pain 0 - no pain AK          Prior Living Environment    Flowsheet Row Most Recent Value   People in Home alone   Current Living Arrangements home   Home Accessibility --  [2 SH with 1 DARÍO]   Living Environment Comment Per chart review, pt lives alone in a 2SH with 1STE   Number of Stairs, Main Entrance 1   Number of Stairs, Within Home, Primary 12  [Full fight to second floor]          Prior Level of Function    Flowsheet Row Most Recent Value   Dominant Hand --  [Unable to determine 2/2 cognitive/communication deficits]   Ambulation independent   Transferring independent   Toileting independent   Bathing independent   Dressing independent   Eating independent   IADLs independent   Communication understands/communicates without difficulty   Swallowing swallows foods/liquids without difficulty   Baseline Diet/Method of Nutritional Intake no diet restrictions   Past History of Dysphagia no   Prior Level of Function Comment Per chart review: PMH SB6,  MT2,  No VFSS in EMR.  Pt seen by SLP at [place].   Assistive Device Currently Used at Home --  [Per chart review pt has RW at home - unclear if pt was using AD and pt unable to state PLOF at this time]          Occupational Profile    Flowsheet Row Most Recent Value   Occupational History/Life Experiences Will need to clarify PLOF/driving status. Pt unable to state 2/2 cognitive/communication deficits.   Environmental Supports and Barriers Per chart review, pt  lives alone but has supportive friends.   Patient Goals Pt unable to state goal 2/2 communication deficits.           IRF OT Evaluation and Treatment - 07/10/23 1104        OT Time Calculation    Start Time 1100     Stop Time 1200     Time Calculation (min) 60 min        Session Details    Document Type Daily Treatment/Progress Note     Mode of Treatment occupational therapy;individual therapy        General Information    General Observations of Patient Pt received lying supine in bed, awake. Direct handoff pre/post session to 1:1.        Mobility Belt    Mobility Belt Used for All Out of Bed Activity no     Reason Mobility Belt Not Used patient refused        Wrist/Hand Orthosis Management    Fabrication Comment (Wrist/Hand Orthosis) R pre-velia wrist cock-up slint donned t/o.        Cognition/Psychosocial    Comment, Cognition Pt c increased frustration and overall decreased motivation/participation in therapy this date. Pt requiring Max enouragement to transfer OOB and participate, eventually agreeable to amb within unit hallways to get orange juice. Pt emotionally labile t/o, tearful 2/2 increased frustration and then more cheeful when saying hello to other staff members in the hallway. OT providing therapeutic listening and empathetic use of self t/o.        Bed Mobility    Comment OT: Supine <> sit EOB c S.        Sit to Stand Transfer    [place], Sit to Stand Transfer close supervision     Safety/Cues impulsivity     Assistive Device none     Comment From EOB and chair with arms.        Stand to Sit Transfer    [place], Stand to Sit Transfer close supervision     Safety/Cues impulsivity     Assistive Device none     Comment To EOB and chair with arms.        Stand Pivot Transfer    [place], Stand Pivot/Stand Step Transfer close supervision     Safety/Cues impulsivity     Assistive Device none     Comment Via amb approach c Cl S for balance and decreased safety awareness.         Impairments/Safety Issues    Comment, Safety Issues/Impairments OT: Medium HHD completed within unit hallways c Steadying A/Cl S s AD.        Upper Extremity (Therapeutic Exercise)    Exercise Position/Type seated;PROM (passive range of motion)     General Exercise right     Range of Motion Exercises wrist flexion/extension     Reps and Sets 2x10     Comment [place] ROM completed while seated EOB.        Hand (Therapeutic Exercise)    Exercise Position/Type seated;PROM (passive range of motion)     General Exercise right     Reps and Sets 2x10        Self-Feeding    [place] set up;supervision     Position unsupported sitting     Setup Assistance prepare tray/open items     Adaptive Equipment none     Comment Pt drank 75% of an orange juice during session. OT encouraging fluids/intake t/o. Pt able to bring cup to mouth c L UE without spillage. Pt required assist to open container 2/2 R UE decreaed distal ROM.        Daily Progress Summary (OT)    Daily Outcome Statement Pt c decreased tolerance to therapy this date in comparision to previous sessions. Pt presenting c increased frustration and tearfulness at start of session. With increased encouragement, pt willing to participate in medium distance HHM/functional transfers. At end of session, pt returned to lying in bed with direct handoff to 1:1. Continue OT POC.                           IRF OT Goals    Flowsheet Row Most Recent Value   Transfer Goal 1    Activity/Assistive Device toilet at 07/02/2023 0711   [place] --  [Cl S] at 07/02/2023 0711   Time Frame short-term goal (STG), 5 - 7 days at 07/05/2023 0702   Progress/Outcome goal ongoing, goal revised this date at 07/05/2023 0702   Transfer Goal 2    Activity/Assistive Device toilet at 07/02/2023 0711   [place] supervision required at 07/02/2023 0711   Time Frame long-term goal (LTG), 14 days or less at 07/02/2023 0711   Progress/Outcome goal ongoing at 07/05/2023 0702   Transfer Goal 3     Activity/Assistive Device shower at 07/02/2023 0711   [place] --  [Cl S] at 07/02/2023 0711   Time Frame short-term goal (STG), 5 - 7 days at 07/02/2023 0711   Progress/Outcome goal ongoing, goal revised this date at 07/05/2023 0702   Transfer Goal 4    Activity/Assistive Device shower at 07/02/2023 0711   [place] supervision required at 07/02/2023 0711   Time Frame long-term goal (LTG), 14 days or less at 07/02/2023 0711   Progress/Outcome goal ongoing at 07/05/2023 0702   Bathing Goal 1    [place] --  [Steadying A] at 07/02/2023 0711   Time Frame short-term goal (STG), 5 - 7 days at 07/05/2023 0702   Progress/Outcome goal ongoing, goal revised this date at 07/05/2023 0702   Bathing Goal 2    [place] supervision required at 07/02/2023 0711   Time Frame long-term goal (LTG), 14 days or less at 07/02/2023 0711   Progress/Outcome goal ongoing at 07/05/2023 0702   UB Dressing Goal 1    [place] --  [Cl S] at 07/05/2023 0702   Time Frame short-term goal (STG), 5 - 7 days at 07/05/2023 0702   UB Dressing Goal 2    [place] set-up required at 07/02/2023 0711   Time Frame long-term goal (LTG), 14 days or less at 07/02/2023 0711   Progress/Outcome goal ongoing at 07/05/2023 0702   LB Dressing Goal 1    [place] --  [Cl S] at 07/05/2023 0702   Time Frame short-term goal (STG), 5 - 7 days at 07/05/2023 0702   LB Dressing Goal 2    [place] supervision required at 07/02/2023 0711   Time Frame long-term goal (LTG), 14 days or less at 07/02/2023 0711   Progress/Outcome goal ongoing at 07/05/2023 0702   Grooming Goal 1    [place] --  [Steadying A] at 07/05/2023 0702   Time Frame short-term goal (STG), 5 - 7 days at 07/05/2023 0702   Progress/Outcome goal revised this date at 07/05/2023 0702   Grooming Goal 2    [place] set-up required at 07/02/2023 0711   Time Frame long-term goal (LTG), 14 days or less at 07/02/2023 0711   Progress/Outcome goal ongoing at 07/05/2023 0702    Toileting Goal 1    [place] --  [Cl S] at 07/05/2023 0702   Time Frame short-term goal (STG), 5 - 7 days at 07/05/2023 0702   Progress/Outcome goal met, goal revised this date at 07/05/2023 0702   Toileting Goal 2    [place] supervision required at 07/02/2023 0711   Time Frame long-term goal (LTG), 14 days or less at 07/02/2023 0711   Progress/Outcome goal ongoing at 07/05/2023 0702

## 2023-07-10 NOTE — PROGRESS NOTES
Patient: Jennifer Sams  Location: [facility] [address]  MRN: [id number]  Today's date: 7/10/2023    History of Present Illness  Jennifer is a 78 y.o. female admitted on 7/1/2023 with Acute ischemic left MCA stroke (CMS/HCC) [I63.512]. Principal problem is Acute ischemic left MCA stroke (CMS/HCC).    The patient is a 78-year-old  female with a history of AAA, heart block s/p cardiac pacemaker, glaucoma, hyperlipidemia, coronary artery disease, NSTEMI, who presented to [facility] on June 26 after she has not been seen by her friends for a few days.  Patient lives alone and when she was found by EMS she was confused and combative.  In the emergency department she was aphasic with right-sided weakness and hypertensive to the 190 systolic.  CT scan of the head revealed an acute infarct in left MCA with mild bleeding.  Echo showed an EF of 43% and a bubble study was negative.  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  Later this was transitioned to apixaban.  She was continued on statin for secondary prevention.  She was evaluated by speech therapy and cleared for a soft and bite-size diet with moderately thick liquids.    7/2/2023:  Diet SB6 with mildly thick liquids (MT2)  7/6/2023: Upgraded to EC7 with thin liquids    Past Medical History  Jennifer has a past medical history of AAA (abdominal aortic aneurysm), Arthritis, Elevated blood pressure reading without diagnosis of hypertension (4/19/2019), Epistaxis (1/6/2020), GERD (gastroesophageal reflux disease) (4/19/2019), Glaucoma (4/19/2019), Heart murmur, Hiatal hernia, History of hip replacement, total, right (4/19/2019), History of rheumatic fever as a child (4/19/2019), Hypercholesterolemia (4/19/2019), Ischemic cardiomyopathy (5/9/2019), Kidney cysts, Osteoarthritis of multiple joints (4/19/2019), Osteoporosis, Pacemaker (12/9/2019),  Raynaud's disease (4/19/2019), RSD (reflex sympathetic dystrophy) (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           07/10/23 0732 07/10/23 0748   Pain/Comfort/Sleep   Pain Charting Type Pain Assessment Pain Reassessment   Preferred Pain Scale word (verbal rating pain scale) word (verbal rating pain scale)   Word Pain Rating: Rest 0 - no pain 0 - no pain   Word Pain Rating: Activity 0 - no pain 0 - no pain   Vitals   Heart Rate 60  --    Heart Rate Source Left Radial  --    /70  --    BP Location Left upper arm  --    BP Method Manual  --    Patient Position Lying  --        Prior Living Environment    Flowsheet Row Most Recent Value   People in Home alone   Current Living Arrangements home   Home Accessibility --  [2 SH with 1 DARÍO]   Living Environment Comment Per chart review, pt lives alone in a 2SH with 1STE   Number of Stairs, Main Entrance 1   Number of Stairs, Within Home, Primary 12  [Full fight to second floor]          Prior Level of Function    Flowsheet Row Most Recent Value   Dominant Hand --  [Unable to determine 2/2 cognitive/communication deficits]   Ambulation independent   Transferring independent   Toileting independent   Bathing independent   Dressing independent   Eating independent   IADLs independent   Communication understands/communicates without difficulty   Swallowing swallows foods/liquids without difficulty   Baseline Diet/Method of Nutritional Intake no diet restrictions   Past History of Dysphagia no   Prior Level of Function Comment Per chart review: PMH SB6,  MT2,  No VFSS in EMR.  Pt seen by SLP at [place].   Assistive Device Currently Used at Home --  [Per chart review pt has RW at home - unclear if pt was using AD and pt unable to state PLOF at this time]          Occupational Profile    Flowsheet Row Most Recent Value   Occupational History/Life Experiences Will need to clarify  "PLOF/driving status. Pt unable to state 2/2 cognitive/communication deficits.   Environmental Supports and Barriers Per chart review, pt lives alone but has supportive friends.   Patient Goals Pt unable to state goal 2/2 communication deficits.               07/10/23 0732   OT Time Calculation   Start Time 0730   Stop Time 0750   Time Calculation (min) 20 min   Session Details   Document Type Daily Treatment/Progress Note   Mode of Treatment occupational therapy;individual therapy   General Information   General Observations of Patient Pt received lying supine in bed, asleep. Direct handoff pre/post session to 1:1.   Mobility Belt   Mobility Belt Used for All Out of Bed Activity no   Reason Mobility Belt Not Used Pt did not complete any transfers OOB this session.   Wrist/Hand Orthosis Management   Fabrication Comment (Wrist/Hand Orthosis) Pt received c R wrist cock up splint donned, OT doffing 2/2 only to wear t/o day and pt choosing to remain sleeping in bed. Skin unremarkable.   Daily Progress Summary (OT)   Daily Outcome Statement Pt initially scheduled for a shower/ADL this AM. Pt ultimately refusing participation in session, adamantly stating \"no\" and gesturing that she wants to continue to sleep. OT present for 20 min to assess vitals, doff R wrist cock-up splint and increased time required 2/2 communication deficits. OT spent increased time providing various therapeutic options and encouragement, however pt refsuing all options. Therapist rearranged schedule to attempt  to see pt later this day. Continue OT POC.               IRF OT Goals    Flowsheet Row Most Recent Value   Transfer Goal 1    Activity/Assistive Device toilet at 07/02/2023 0711   [place] --  [Cl S] at 07/02/2023 0711   Time Frame short-term goal (STG), 5 - 7 days at 07/05/2023 0702   Progress/Outcome goal ongoing, goal revised this date at 07/05/2023 0702   Transfer Goal 2    Activity/Assistive Device toilet at 07/02/2023 0711 "   [place] supervision required at 07/02/2023 0711   Time Frame long-term goal (LTG), 14 days or less at 07/02/2023 0711   Progress/Outcome goal ongoing at 07/05/2023 0702   Transfer Goal 3    Activity/Assistive Device shower at 07/02/2023 0711   [place] --  [Cl S] at 07/02/2023 0711   Time Frame short-term goal (STG), 5 - 7 days at 07/02/2023 0711   Progress/Outcome goal ongoing, goal revised this date at 07/05/2023 0702   Transfer Goal 4    Activity/Assistive Device shower at 07/02/2023 0711   [place] supervision required at 07/02/2023 0711   Time Frame long-term goal (LTG), 14 days or less at 07/02/2023 0711   Progress/Outcome goal ongoing at 07/05/2023 0702   Bathing Goal 1    [place] --  [Steadying A] at 07/02/2023 0711   Time Frame short-term goal (STG), 5 - 7 days at 07/05/2023 0702   Progress/Outcome goal ongoing, goal revised this date at 07/05/2023 0702   Bathing Goal 2    [place] supervision required at 07/02/2023 0711   Time Frame long-term goal (LTG), 14 days or less at 07/02/2023 0711   Progress/Outcome goal ongoing at 07/05/2023 0702   UB Dressing Goal 1    [place] --  [Cl S] at 07/05/2023 0702   Time Frame short-term goal (STG), 5 - 7 days at 07/05/2023 0702   UB Dressing Goal 2    [place] set-up required at 07/02/2023 0711   Time Frame long-term goal (LTG), 14 days or less at 07/02/2023 0711   Progress/Outcome goal ongoing at 07/05/2023 0702   LB Dressing Goal 1    [place] --  [Cl S] at 07/05/2023 0702   Time Frame short-term goal (STG), 5 - 7 days at 07/05/2023 0702   LB Dressing Goal 2    [place] supervision required at 07/02/2023 0711   Time Frame long-term goal (LTG), 14 days or less at 07/02/2023 0711   Progress/Outcome goal ongoing at 07/05/2023 0702   Grooming Goal 1    [place] --  [Steadying A] at 07/05/2023 0702   Time Frame short-term goal (STG), 5 - 7 days at 07/05/2023 0702   Progress/Outcome goal revised this date at 07/05/2023 0702    Grooming Goal 2    [place] set-up required at 07/02/2023 0711   Time Frame long-term goal (LTG), 14 days or less at 07/02/2023 0711   Progress/Outcome goal ongoing at 07/05/2023 0702   Toileting Goal 1    [place] --  [Cl S] at 07/05/2023 0702   Time Frame short-term goal (STG), 5 - 7 days at 07/05/2023 0702   Progress/Outcome goal met, goal revised this date at 07/05/2023 0702   Toileting Goal 2    [place] supervision required at 07/02/2023 0711   Time Frame long-term goal (LTG), 14 days or less at 07/02/2023 0711   Progress/Outcome goal ongoing at 07/05/2023 0702

## 2023-07-10 NOTE — PROGRESS NOTES
Inpatient Psychology Progress Note    Duration: 30 minutes  Jennifer Sams : 1945, a 78 y.o. female, for follow up visit.  Reason:  She has been experiencing adjustment to stroke and cognitive/language dysfunction.    HPI: No chief complaint on file.      Current Evaluation:     Mental Status Evaluation:  Arousal Level: Alert  Appearance: Well Groomed  Speech: Aphasic  Psychomotor Functioning: WNL  Eye Contact: WNL  Est. Premorbid Intelligence: Average  Orientation: Oriented to person  Attention: Unable to Assess  Concentration: Unable to Assess  Recent Memory: Unable to assess  Remote Memory: Unable to Assess  Thought Content: Unable to Assess  Thought Process: Unable to Assess  Insight: Impaired, moderately  Perceptual Function: Visual impairment  Sleeping: Decreased  Appetite: Decreased  Libido: Non-Contributory  Affect: Full Range  Mood: Depressed, Anxious, Motivated    Comments:      Additional Assessments done this visit:  Agitated Behavior Scale Total: 16  [place] Suicide Severity Rating Scale:  Done today        [place] Suicide Severity Rating Scale  1. Within the past month, have you wished you were dead or wished you could go to sleep and not wake up?: No (unable to answer, gloal aphasia)  2. Within the past month, have you actually had any thoughts of killing yourself?: No (unable to answer, gloal aphasia)  6. Have you ever done anything, started to do anything, or prepared to do anything to end your life?: No (unable to answer, gloal aphasia)    Safe-T Assessment:  Not indicated        Session Summary:   See progress summary below.      Goals Addressed                 This Visit's Progress    • Increase adjustment to rehabilitation facility.   On track    • Maximize adjustment and acceptance of limitations   On track    • Reduce anxiety/depression   On track          Interventions  Acceptance & Adjustment  Anxiety Reduction Techniques  Communication Skills development  Insight  Development  Monitoring of Symptoms  Psychoeducation    Psychoeducation provided on:  CVA and Recovery and Depression Anxiety     Recommendations:      Individual Therapy  30 minutesweekly      Visit Diagnosis:     1. Acute ischemic left MCA stroke (CMS/HCC)    2. Adjustment disorder with mixed anxiety and depressed mood        Diagnostic Impression:   Weekly Progress Summary: progressing toward goals slower than expected  Weekly Outcome Statement: Pt is alert; continues to present with non-fluent global aphasia characterized by inconsistent yes/no responses and 1 step command following, anomia, and receptive and expressive language deficits.  Temporal and spatial orientation as well as autobio information remains difficult to obtain secondary to aphasia.  Insight, judgment, and safety are deemed impaired.  Affect was WNR; pt denies distress or pain. She endorsed feelings of depression and anxiety related to her stroke (thumbs up).  Appetite remains decreased as well as interest in food intake.  Attempted to review pt's daily food choices prior to the stroke.  Pt denies SI or passive/active death wish.  She endorsed feeling tired and per 1:1, she slept in this morning but participated in her therapies.  Reinforced importance of participating in her rehab to optimize her recovery.  Pt responded positively to support and encouragement.  Continue to monitor mood, affective functioning, and language/communication.    Psychological condition is generally showing no change       Yaneth Reyes PSY.D @ 2:41 PM

## 2023-07-10 NOTE — PROGRESS NOTES
Patient: Jennifer Sams  Location: [facility] [address]  MRN: [id number]  Today's date: 7/10/2023    History of Present Illness  Jennifer is a 78 y.o. female admitted on 7/1/2023 with Acute ischemic left MCA stroke (CMS/HCC) [I63.512]. Principal problem is Acute ischemic left MCA stroke (CMS/HCC).    The patient is a 78-year-old  female with a history of AAA, heart block s/p cardiac pacemaker, glaucoma, hyperlipidemia, coronary artery disease, NSTEMI, who presented to [facility] on June 26 after she has not been seen by her friends for a few days.  Patient lives alone and when she was found by EMS she was confused and combative.  In the emergency department she was aphasic with right-sided weakness and hypertensive to the 190 systolic.  CT scan of the head revealed an acute infarct in left MCA with mild bleeding.  Echo showed an EF of 43% and a bubble study was negative.  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  Later this was transitioned to apixaban.  She was continued on statin for secondary prevention.  She was evaluated by speech therapy and cleared for a soft and bite-size diet with moderately thick liquids.    7/2/2023:  Diet SB6 with mildly thick liquids (MT2)  7/6/2023: Upgraded to EC7 with thin liquids    Past Medical History  Jennifer has a past medical history of AAA (abdominal aortic aneurysm), Arthritis, Elevated blood pressure reading without diagnosis of hypertension (4/19/2019), Epistaxis (1/6/2020), GERD (gastroesophageal reflux disease) (4/19/2019), Glaucoma (4/19/2019), Heart murmur, Hiatal hernia, History of hip replacement, total, right (4/19/2019), History of rheumatic fever as a child (4/19/2019), Hypercholesterolemia (4/19/2019), Ischemic cardiomyopathy (5/9/2019), Kidney cysts, Osteoarthritis of multiple joints (4/19/2019), Osteoporosis, Pacemaker (12/9/2019),  Raynaud's disease (4/19/2019), RSD (reflex sympathetic dystrophy) (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        SLP Pain    Date/Time Pain Type CNPI: Nonverbal Movement CNPI: Nonverbal Rest CNPI: Facial Movement CNPI: Facial Rest CNPI: Bracing Movement CNPI: Bracing Rest CNPI: Restlessness Movement CNPI: Restlessness Rest CNPI: Rubbing Movement CNPI: Rubbing Rest CNPI: Vocal Movement CNPI: Vocal Rest CNPI: Score Who   07/10/23 1333 Pain Assessment 0 0 0 0 0 0 0 0 0 0 0 0 0 CARLA   07/10/23 1358 Pain Reassessment 0 0 0 0 0 0 0 0 0 0 0 0 0 CARLA          Prior Living Environment    Flowsheet Row Most Recent Value   People in Home alone   Current Living Arrangements home   Home Accessibility --  [2 SH with 1 DARÍO]   Living Environment Comment Per chart review, pt lives alone in a 2SH with 1STE   Number of Stairs, Main Entrance 1   Number of Stairs, Within Home, Primary 12  [Full fight to second floor]          Prior Level of Function    Flowsheet Row Most Recent Value   Dominant Hand --  [Unable to determine 2/2 cognitive/communication deficits]   Ambulation independent   Transferring independent   Toileting independent   Bathing independent   Dressing independent   Eating independent   IADLs independent   Communication understands/communicates without difficulty   Swallowing swallows foods/liquids without difficulty   Baseline Diet/Method of Nutritional Intake no diet restrictions   Past History of Dysphagia no   Prior Level of Function Comment Per chart review: PMH SB6,  MT2,  No VFSS in EMR.  Pt seen by SLP at [facility].   Assistive Device Currently Used at Home --  [Per chart review pt has RW at home - unclear if pt was using AD and pt unable to state PLOF at this time]           IRF SLP Evaluation and Treatment - 07/10/23 1333        SLP Time Calculation    Start Time 1330     Stop Time 1400     Time Calculation (min) 30 min         "Session Details    Document Type Daily Treatment/Progress Note     Mode of Treatment individual therapy;speech language pathology        General Information    General Observations of Patient pt seen in room, 1:1 handoff with sitter at the beginning and end of the session        Auditory Comprehension    Comment, Intervention (Auditory Comprehension) Pt traced shapes and symbols (i.e., flower, heart, sun, [place], square, guerline, peace sign, smiley face, letters of her name), though did not utilize crayon for copying independently.        Verbal Expression    Comment, Intervention (Verbal Expression) Pt waving hello and providing thumbs up for communication. Pt with use of facial expression, gesture, and verbalizing \"aw\" when looking at personal pictures of this SLP. There was a significant communication breakdown x1 during the session, with pt consistently holding up four fingers. Unable to determine context of the number four despite use of multi-modalities. Attempted to obtain personal fact of what music pt liked with use of pictures/written output. Pt pointed to the band ABBA, though difficult to confirm if this was a favorite of the pt's. Frequent use of \"I\" noted.        Daily Progress Summary (SLP)    Daily Outcome Statement Session focused on language. Pt utilized gesture and facial expression for primary means of communication with this novel communication partner. There was x1 significant communication breakdown which was unable to be alleviated. Pt traced various shapes/symbols. Continue POC towards primary SLP goals.                      IRF SLP Goals    Flowsheet Row Most Recent Value   Auditory Comprehension Goal 1    Auditory Comprehension Goal 1 pt will complete basic Aud comprehension tasks (simple yes/no 75%),  1 step commands 50% with max cues,  match word heard to picture f/o 2 50% max cues at 07/02/2023 1001   Time Frame short-term goal (STG), 1 week at 07/02/2023 1001   Progress/Outcome goal " ongoing at 07/04/2023 1510   Auditory Comprehension Goal 2    Auditory Comprehension Goal 2 Pt will understand mod-complex level y/n questions 90% min cues,  2 step commands 75% min-mod cues,  at 07/02/2023 1001   Time Frame long-term goal (LTG), 4 weeks at 07/02/2023 1001   Progress/Outcome goal ongoing at 07/04/2023 1510   Verbal Expression Goal 1    Verbal Expression Goal 1 Pt will complete basic VE tasks (name. automatics, simple opposites, naming) with 20% accuracy and max cues. at 07/02/2023 1001   Time Frame short-term goal (STG), 1 week at 07/02/2023 1001   Progress/Outcome goal ongoing at 07/04/2023 1510   Verbal Expression Goal 2    Verbal Expression Goal 2 Pt will complete Verbal expression tasks (basic-mod) with 50-75% accuracy and min-mod cues.  Pt will imitate vowels 50% max cues,  at 07/02/2023 1001   Time Frame long-term goal (LTG), 4 weeks at 07/02/2023 1001   Progress/Outcome goal ongoing at 07/04/2023 1510   Motor Speech/Voice Goal 1    Motor Speech/Voice Goal 1 Repetition of vowels, CV/VC and CVC targets with max cues and models for 60% acc. at 07/04/2023 1510   Time Frame short-term goal (STG), 2 weeks at 07/04/2023 1510   Progress/Outcome goal ongoing at 07/04/2023 1510   Motor Speech/Voice Goal 2    Motor Speech/Voice Goal 2 Repetition of vowels, CV/VC and CVC targets with min cues and models for 90% acc. at 07/04/2023 1510   Time Frame long-term goal (LTG), 4 weeks at 07/04/2023 1510   Progress/Outcome goal ongoing at 07/04/2023 1510   Reading Comprehension Goal 1    Reading Comprehension Goal 1 Pt will match word to picture field of 2 with 50% accuracy and mod-max cues,  Pt will read single functional words with 20% accuracy max cues at 07/02/2023 1001   Time Frame short-term goal (STG), 1 week at 07/02/2023 1001   Progress/Outcome goal no longer appropriate at 07/04/2023 1510   Reading Comprehension Goal 2    Reading Comprehension Goal 2 Pt will read simple sentences and comprehend simple  sentences with 75% accuracy min-mod cues at 07/02/2023 1001   Time Frame long-term goal (LTG) at 07/02/2023 1001   Progress/Outcome goal no longer appropriate at 07/04/2023 1510   Written Expression Goal 1    Written Expression Goal 1 Pt will copy first name, shapes, letters and 3-4 letter words with 80% min cues,  pt will copy letters, name shapes with 50% accuracy and mod cues. at 07/02/2023 1001   Time Frame short-term goal (STG), 1 week at 07/02/2023 1001   Progress/Outcome goal no longer appropriate at 07/04/2023 1510   Written Expression Goal 2    Written Expression Goal 2 Pt will write first and last name,  single words 90% accuracy at 07/02/2023 1001   Progress/Outcome goal no longer appropriate at 07/04/2023 1510   Oral Nutrition/Hydration Goal 1    Activity effective/safe, use of swallowing techniques, management of texture/viscosity, other (see comments)  [SB6,  MT2.  Trials of thins and various textures by speech with min s/s of aspiration] at 07/02/2023 1001   Time Frame short-term goal (STG), 14 days or less at 07/02/2023 1001   Progress/Outcome goal ongoing at 07/04/2023 1510   Oral Nutrition/Hydration Goal 2    Activity effective/safe, oral nutrition/hydration, use of swallowing techniques, management of texture/viscosity, other (see comments)  [regular and thins] at 07/02/2023 1001   Time Frame long-term goal (LTG), 4 weeks at 07/02/2023 1001   Progress/Outcome goal ongoing at 07/04/2023 1510   Oral Motor Exercise Goal 1    Activity perform oral motor strengthening exercises, facial/cheek, lip, tongue, strength/ROM, mastication/bolus manipulation, 5-10 times per session, other (see comments)  [use mirror/visual model] at 07/02/2023 1001   [place] with 1:1 supervision/constant cues at 07/02/2023 1001   Time Frame short-term goal (STG), 14 days or less at 07/02/2023 1001   Progress/Outcome goal ongoing at 07/04/2023 1510   Oral Motor Exercise Goal 2    Activity perform oral motor strengthening  exercises, facial/cheek, lip, tongue, strength/ROM, mastication/bolus manipulation, other (see comments)  [2-3 times a day sets of 10-15] at 07/02/2023 1001   [place] with minimal cues (75-90% accuracy) at 07/02/2023 1001   Time Frame long-term goal (LTG), 4 weeks at 07/02/2023 1001   Progress/Outcome goal ongoing at 07/04/2023 8350

## 2023-07-10 NOTE — PLAN OF CARE
Plan of Care Review  Plan of Care Reviewed With: patient  Progress: improving  Outcome Evaluation: Pt is AOX2 w/expressive aphasia; pt is 1:1 for safety; pt is continent of bowel and bladder; pt denied any pain; pt is able to make her nees known; pt is currently in bed w/alarm in place & call bell w/in reach.

## 2023-07-10 NOTE — PLAN OF CARE
Plan of Care Review  Plan of Care Reviewed With: patient  Progress: improving  Outcome Evaluation: Patient is alert, expressive aphasia. 1:1 sitter maintained around the clock. Patient reports no pain. Sleeping well throughout the night. Fall and safety precautios maintained.

## 2023-07-10 NOTE — PROGRESS NOTES
Patient: Jennifer Sams  Location: [facility] [address]  MRN: [id number]  Today's date: 7/10/2023    History of Present Illness  Jennifer is a 78 y.o. female admitted on 7/1/2023 with Acute ischemic left MCA stroke (CMS/HCC) [I63.512]. Principal problem is Acute ischemic left MCA stroke (CMS/HCC).    The patient is a 78-year-old  female with a history of AAA, heart block s/p cardiac pacemaker, glaucoma, hyperlipidemia, coronary artery disease, NSTEMI, who presented to [facility] on June 26 after she has not been seen by her friends for a few days.  Patient lives alone and when she was found by EMS she was confused and combative.  In the emergency department she was aphasic with right-sided weakness and hypertensive to the 190 systolic.  CT scan of the head revealed an acute infarct in left MCA with mild bleeding.  Echo showed an EF of 43% and a bubble study was negative.  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  Later this was transitioned to apixaban.  She was continued on statin for secondary prevention.  She was evaluated by speech therapy and cleared for a soft and bite-size diet with moderately thick liquids.    7/2/2023:  Diet SB6 with mildly thick liquids (MT2)  7/6/2023: Upgraded to EC7 with thin liquids    Past Medical History  Jennifer has a past medical history of AAA (abdominal aortic aneurysm), Arthritis, Elevated blood pressure reading without diagnosis of hypertension (4/19/2019), Epistaxis (1/6/2020), GERD (gastroesophageal reflux disease) (4/19/2019), Glaucoma (4/19/2019), Heart murmur, Hiatal hernia, History of hip replacement, total, right (4/19/2019), History of rheumatic fever as a child (4/19/2019), Hypercholesterolemia (4/19/2019), Ischemic cardiomyopathy (5/9/2019), Kidney cysts, Osteoarthritis of multiple joints (4/19/2019), Osteoporosis, Pacemaker (12/9/2019),  Raynaud's disease (4/19/2019), RSD (reflex sympathetic dystrophy) (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        SLP Pain    Date/Time Pain Type Rating: Rest Rating: Activity [facility]   07/10/23 0931 Pain Assessment 0 - no pain 0 - no pain AFL   07/10/23 0959 Pain Reassessment 0 - no pain 0 - no pain AFL          Prior Living Environment    Flowsheet Row Most Recent Value   People in Home alone   Current Living Arrangements home   Home Accessibility --  [2 SH with 1 DARÍO]   Living Environment Comment Per chart review, pt lives alone in a 2SH with 1STE   Number of Stairs, Main Entrance 1   Number of Stairs, Within Home, Primary 12  [Full fight to second floor]          Prior Level of Function    Flowsheet Row Most Recent Value   Dominant Hand --  [Unable to determine 2/2 cognitive/communication deficits]   Ambulation independent   Transferring independent   Toileting independent   Bathing independent   Dressing independent   Eating independent   IADLs independent   Communication understands/communicates without difficulty   Swallowing swallows foods/liquids without difficulty   Baseline Diet/Method of Nutritional Intake no diet restrictions   Past History of Dysphagia no   Prior Level of Function Comment Per chart review: PMH SB6,  MT2,  No VFSS in EMR.  Pt seen by SLP at [place].   Assistive Device Currently Used at Home --  [Per chart review pt has RW at home - unclear if pt was using AD and pt unable to state PLOF at this time]           IRF SLP Evaluation and Treatment - 07/10/23 0931        SLP Time Calculation    Start Time 0930     Stop Time 1000     Time Calculation (min) 30 min        Session Details    Document Type Daily Treatment/Progress Note     Mode of Treatment individual therapy;speech language pathology        General Information    General Observations of Patient Pt received in room. Handoff from 1:1; nursing in room  "administering meds.  Denies pain.  Using gestures at times to communivate.        Cognition/Psychosocial    Comment, Attention Pt initially resistant to taking meds with nursing; but was able to be complete task.  Pt became frustrated when therapist did not understand her and shut down.  Pt provided with a few minute break; pt put on her socks [place] during that time.        Orientation Log    Comment unable to compelte due to severity of lanaguage deficits.        Motor Speech    Comment, Motor Speech Intervention Pt refused any motor speech tasks despite max cues.  Pt frequently verbalizing and gesturing no.        Auditory Comprehension    Comment, Intervention (Auditory Comprehension) Pt rejected task y/n task.  Pt met with MD during session; pt listened to MD and appeared to have a general understanding on upcoming discharge; gestured with hands she wanted to leave hospital.        Verbal Expression    Comment, Intervention (Verbal Expression) speaking with     Pt attempting to use gestures.  Pt gesturing to go home; pt frequently responding \"no\" during attempts of structured and unstructured speech tasks; Asked if pt was down about her speech, she shook her head \"yes\".  Discussed need to participate in ST more.        Swallowing Intervention    Comment, Swallowing Interventions Pt consumed 4 oz of Orange juice; required cues for smaller sips.        Daily Progress Summary (SLP)    Daily Outcome Statement Pt continues to use gestural communication to augment verbal communication.  Pt easily frustrated with lack of communication and then will \"shut down\".  Pt verbalized \"thank you\" with close approx x 1; uses gestures frequently; LImited participation on structured tasks. COntinue with POC.                     IRF SLP Goals    Flowsheet Row Most Recent Value   Auditory Comprehension Goal 1    Auditory Comprehension Goal 1 pt will complete basic Aud comprehension tasks (simple yes/no 75%),  1 step commands 50% " with max cues,  match word heard to picture f/o 2 50% max cues at 07/02/2023 1001   Time Frame short-term goal (STG), 1 week at 07/02/2023 1001   Progress/Outcome goal ongoing at 07/04/2023 1510   Auditory Comprehension Goal 2    Auditory Comprehension Goal 2 Pt will understand mod-complex level y/n questions 90% min cues,  2 step commands 75% min-mod cues,  at 07/02/2023 1001   Time Frame long-term goal (LTG), 4 weeks at 07/02/2023 1001   Progress/Outcome goal ongoing at 07/04/2023 1510   Verbal Expression Goal 1    Verbal Expression Goal 1 Pt will complete basic VE tasks (name. automatics, simple opposites, naming) with 20% accuracy and max cues. at 07/02/2023 1001   Time Frame short-term goal (STG), 1 week at 07/02/2023 1001   Progress/Outcome goal ongoing at 07/04/2023 1510   Verbal Expression Goal 2    Verbal Expression Goal 2 Pt will complete Verbal expression tasks (basic-mod) with 50-75% accuracy and min-mod cues.  Pt will imitate vowels 50% max cues,  at 07/02/2023 1001   Time Frame long-term goal (LTG), 4 weeks at 07/02/2023 1001   Progress/Outcome goal ongoing at 07/04/2023 1510   Motor Speech/Voice Goal 1    Motor Speech/Voice Goal 1 Repetition of vowels, CV/VC and CVC targets with max cues and models for 60% acc. at 07/04/2023 1510   Time Frame short-term goal (STG), 2 weeks at 07/04/2023 1510   Progress/Outcome goal ongoing at 07/04/2023 1510   Motor Speech/Voice Goal 2    Motor Speech/Voice Goal 2 Repetition of vowels, CV/VC and CVC targets with min cues and models for 90% acc. at 07/04/2023 1510   Time Frame long-term goal (LTG), 4 weeks at 07/04/2023 1510   Progress/Outcome goal ongoing at 07/04/2023 1510   Reading Comprehension Goal 1    Reading Comprehension Goal 1 Pt will match word to picture field of 2 with 50% accuracy and mod-max cues,  Pt will read single functional words with 20% accuracy max cues at 07/02/2023 1001   Time Frame short-term goal (STG), 1 week at 07/02/2023 1001    Progress/Outcome goal no longer appropriate at 07/04/2023 1510   Reading Comprehension Goal 2    Reading Comprehension Goal 2 Pt will read simple sentences and comprehend simple sentences with 75% accuracy min-mod cues at 07/02/2023 1001   Time Frame long-term goal (LTG) at 07/02/2023 1001   Progress/Outcome goal no longer appropriate at 07/04/2023 1510   Written Expression Goal 1    Written Expression Goal 1 Pt will copy first name, shapes, letters and 3-4 letter words with 80% min cues,  pt will copy letters, name shapes with 50% accuracy and mod cues. at 07/02/2023 1001   Time Frame short-term goal (STG), 1 week at 07/02/2023 1001   Progress/Outcome goal no longer appropriate at 07/04/2023 1510   Written Expression Goal 2    Written Expression Goal 2 Pt will write first and last name,  single words 90% accuracy at 07/02/2023 1001   Progress/Outcome goal no longer appropriate at 07/04/2023 1510   Oral Nutrition/Hydration Goal 1    Activity effective/safe, use of swallowing techniques, management of texture/viscosity, other (see comments)  [SB6,  MT2.  Trials of thins and various textures by speech with min s/s of aspiration] at 07/02/2023 1001   Time Frame short-term goal (STG), 14 days or less at 07/02/2023 1001   Progress/Outcome goal ongoing at 07/04/2023 1510   Oral Nutrition/Hydration Goal 2    Activity effective/safe, oral nutrition/hydration, use of swallowing techniques, management of texture/viscosity, other (see comments)  [regular and thins] at 07/02/2023 1001   Time Frame long-term goal (LTG), 4 weeks at 07/02/2023 1001   Progress/Outcome goal ongoing at 07/04/2023 1510   Oral Motor Exercise Goal 1    Activity perform oral motor strengthening exercises, facial/cheek, lip, tongue, strength/ROM, mastication/bolus manipulation, 5-10 times per session, other (see comments)  [use mirror/visual model] at 07/02/2023 1001   [place] with 1:1 supervision/constant cues at 07/02/2023 1001   Time Frame  short-term goal (STG), 14 days or less at 07/02/2023 1001   Progress/Outcome goal ongoing at 07/04/2023 1510   Oral Motor Exercise Goal 2    Activity perform oral motor strengthening exercises, facial/cheek, lip, tongue, strength/ROM, mastication/bolus manipulation, other (see comments)  [2-3 times a day sets of 10-15] at 07/02/2023 1001   [place] with minimal cues (75-90% accuracy) at 07/02/2023 1001   Time Frame long-term goal (LTG), 4 weeks at 07/02/2023 1001   Progress/Outcome goal ongoing at 07/04/2023 1510

## 2023-07-10 NOTE — PROGRESS NOTES
Physical Medicine and Rehabilitation Progress Note          Patient was seen and examined.   Attestation Notes: Face to face encounter completed    Subjective     No acute events overnight.  Patient seen and examined this morning in room.  History limited by cog/communication impairment.      Review of Systems:  Negative except as per HPI    Objective     Vital signs in last 24 hours:  Temp:  [36.4 °C (97.6 °F)-36.7 °C (98.1 °F)] 36.4 °C (97.6 °F)  Heart Rate:  [60-80] 80  Resp:  [16] 16  BP: (115-140)/(62-80) 140/80    Physical Exam      General Appearance:        Alert, cooperative, no distress   Head:    Normocephalic,atraumatic   Eyes:    Conjunctiva clear      Lungs:     Clear to auscultation bilaterally, respirations unlabored   Heart:    Regular rate and rhythm, no murmur   Abdomen:     Soft, non-tender, bowel sounds active   Extremities:     Extremities normal, atraumatic, no cyanosis or edema           Skin:   No rashes or lesions   Neurologic:          Behavior/  Emotional:   Awake, alert, aphasic, verbal output limited to incoherent sounds.  She is able to follow simple commands with increased time.  0/5 with wrist extension, , finger abduction on right.      Appropriate, cooperative                  Results from last 7 days   Lab Units 07/10/23  0514   WBC K/uL 5.12   RBC M/uL 4.80   HEMOGLOBIN g/dL 15.0   HEMATOCRIT % 45.0   PLATELETS K/uL 204   MCV fL 93.8   RDW % 13.2   EOS ABS AUTO K/uL 0.11       Results from last 7 days   Lab Units 07/10/23  0514 07/07/23  1205 07/06/23  1453 07/04/23  0519   SODIUM mEQ/L 140 139 141 141   POTASSIUM mEQ/L 4.2 4.4 4.5 3.2*   CHLORIDE mEQ/L 105 104 107 107   CO2 mEQ/L 27 26 28 26   BUN mg/dL 11 9 7 7   CREATININE mg/dL 0.8 0.7 0.8 0.6   EGFR mL/min/1.73m*2 >60.0 >60.0 >60.0 >60.0   ANION GAP mEQ/L 8 9 6 8       Current Facility-Administered Medications   Medication Dose Route Frequency   • acetaminophen  650 mg oral q6h PRN   • apixaban  5 mg oral BID   •  cycloSPORINE  1 drop Both Eyes q12h ROBERTO   • lansoprazole  30 mg oral Daily before breakfast   • melatonin  3 mg oral Nightly   • rosuvastatin  20 mg oral Nightly   • senna  1 tablet oral 2x daily PRN   • trazodone  25 mg oral Nightly       Plan of care was discussed with patient                    * Acute ischemic left MCA stroke (CMS/HCC)  Assessment & Plan  The patient is a 78-year-old  female with a history of AAA, heart block s/p cardiac pacemaker, glaucoma, hyperlipidemia, coronary artery disease, NSTEMI, who presented to [facility] on June 26 after she has not been seen by her friends for a few days.   In the emergency department she was aphasic with right-sided weakness and hypertensive to the 190 systolic.  CT scan of the head revealed an acute infarct in left MCA with mild bleeding.  Echo showed an EF of 43% and a bubble study was negative.  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  Later this was transitioned to apixaban.        -Full program PT/OT/SLP/rehabilitation RN/psychology. Consult internal medicine for comorbidities.  -Continue apixaban  -Continue statin  -Sleep - melatonin and trazodone  -Safety - one-to-one continuous observation, low bed, lapbelt.  Wean restraints as able    Disposition -anticipate patient will need 24/7 supervision for safe discharge          Right wrist drop  Assessment & Plan  Splinting, contracture prevention    Left ventricular apical thrombus  Assessment & Plan  Continue apixaban    Coronary artery disease involving native heart without angina pectoris  Assessment & Plan  Continue apixaban    Glaucoma  Assessment & Plan  Continue eye drops    Follow-up exam  Assessment & Plan  Dispo: 7/14 will need at [facility] supervision at time of DC from IRF      PCP  Cardiology Dr. Lorenzo for her cardiac function and anticoagulation treatment  Neurology

## 2023-07-11 ENCOUNTER — APPOINTMENT (INPATIENT)
Dept: OCCUPATIONAL THERAPY | Facility: REHABILITATION | Age: 78
DRG: 057 | End: 2023-07-11
Payer: MEDICARE

## 2023-07-11 ENCOUNTER — APPOINTMENT (INPATIENT)
Dept: SPEECH THERAPY | Facility: REHABILITATION | Age: 78
DRG: 057 | End: 2023-07-11
Payer: MEDICARE

## 2023-07-11 ENCOUNTER — APPOINTMENT (INPATIENT)
Dept: PHYSICAL THERAPY | Facility: REHABILITATION | Age: 78
DRG: 057 | End: 2023-07-11
Payer: MEDICARE

## 2023-07-11 PROCEDURE — 97112 NEUROMUSCULAR REEDUCATION: CPT | Mod: GO,59

## 2023-07-11 PROCEDURE — 97530 THERAPEUTIC ACTIVITIES: CPT | Mod: GO

## 2023-07-11 PROCEDURE — 97129 THER IVNTJ 1ST 15 MIN: CPT | Mod: GO

## 2023-07-11 PROCEDURE — 92507 TX SP LANG VOICE COMM INDIV: CPT | Mod: GN

## 2023-07-11 PROCEDURE — 63700000 HC SELF-ADMINISTRABLE DRUG: Performed by: HOSPITALIST

## 2023-07-11 PROCEDURE — 97116 GAIT TRAINING THERAPY: CPT | Mod: GP

## 2023-07-11 PROCEDURE — 12800000 HC ROOM AND CARE SEMIPRIVATE REHAB

## 2023-07-11 PROCEDURE — 97535 SELF CARE MNGMENT TRAINING: CPT | Mod: GO

## 2023-07-11 PROCEDURE — 97530 THERAPEUTIC ACTIVITIES: CPT | Mod: GP,59

## 2023-07-11 PROCEDURE — 92526 ORAL FUNCTION THERAPY: CPT | Mod: GN

## 2023-07-11 PROCEDURE — 63700000 HC SELF-ADMINISTRABLE DRUG: Performed by: STUDENT IN AN ORGANIZED HEALTH CARE EDUCATION/TRAINING PROGRAM

## 2023-07-11 RX ADMIN — Medication 3 MG: at 20:19

## 2023-07-11 RX ADMIN — CYCLOSPORINE 1 DROP: 0.5 EMULSION OPHTHALMIC at 09:36

## 2023-07-11 RX ADMIN — ROSUVASTATIN CALCIUM 20 MG: 20 TABLET, FILM COATED ORAL at 20:20

## 2023-07-11 RX ADMIN — APIXABAN 5 MG: 5 TABLET, FILM COATED ORAL at 09:36

## 2023-07-11 RX ADMIN — APIXABAN 5 MG: 5 TABLET, FILM COATED ORAL at 20:19

## 2023-07-11 RX ADMIN — CYCLOSPORINE 1 DROP: 0.5 EMULSION OPHTHALMIC at 20:19

## 2023-07-11 RX ADMIN — TRAZODONE HYDROCHLORIDE 25 MG: 50 TABLET, FILM COATED ORAL at 20:19

## 2023-07-11 ASSESSMENT — BALANCE ASSESSMENTS: TOTAL SCORE: 37

## 2023-07-11 NOTE — PROGRESS NOTES
Patient: Jennifer Sams  Location: [facility] [address]  MRN: [id number]  Today's date: 7/11/2023    History of Present Illness  Jennifer is a 78 y.o. female admitted on 7/1/2023 with Acute ischemic left MCA stroke (CMS/HCC) [I63.512]. Principal problem is Acute ischemic left MCA stroke (CMS/HCC).    The patient is a 78-year-old  female with a history of AAA, heart block s/p cardiac pacemaker, glaucoma, hyperlipidemia, coronary artery disease, NSTEMI, who presented to [facility] on June 26 after she has not been seen by her friends for a few days.  Patient lives alone and when she was found by EMS she was confused and combative.  In the emergency department she was aphasic with right-sided weakness and hypertensive to the 190 systolic.  CT scan of the head revealed an acute infarct in left MCA with mild bleeding.  Echo showed an EF of 43% and a bubble study was negative.  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  Later this was transitioned to apixaban.  She was continued on statin for secondary prevention.  She was evaluated by speech therapy and cleared for a soft and bite-size diet with moderately thick liquids.    7/2/2023:  Diet SB6 with mildly thick liquids (MT2)  7/6/2023: Upgraded to EC7 with thin liquids    Past Medical History  Jennifer has a past medical history of AAA (abdominal aortic aneurysm), Arthritis, Elevated blood pressure reading without diagnosis of hypertension (4/19/2019), Epistaxis (1/6/2020), GERD (gastroesophageal reflux disease) (4/19/2019), Glaucoma (4/19/2019), Heart murmur, Hiatal hernia, History of hip replacement, total, right (4/19/2019), History of rheumatic fever as a child (4/19/2019), Hypercholesterolemia (4/19/2019), Ischemic cardiomyopathy (5/9/2019), Kidney cysts, Osteoarthritis of multiple joints (4/19/2019), Osteoporosis, Pacemaker (12/9/2019),  Raynaud's disease (4/19/2019), RSD (reflex sympathetic dystrophy) (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        SLP Pain    Date/Time Pain Type Rating: Rest Rating: Activity [facility]   07/11/23 1334 Pain Assessment 0 - no pain 0 - no pain CMP   07/11/23 1358 Pain Reassessment 0 - no pain 0 - no pain CMP          Prior Living Environment    Flowsheet Row Most Recent Value   People in Home alone   Current Living Arrangements home   Home Accessibility --  [2 SH with 1 DARÍO]   Living Environment Comment Per chart review, pt lives alone in a 2SH with 1STE   Number of Stairs, Main Entrance 1   Number of Stairs, Within Home, Primary 12  [Full fight to second floor]          Prior Level of Function    Flowsheet Row Most Recent Value   Dominant Hand --  [Unable to determine 2/2 cognitive/communication deficits]   Ambulation independent   Transferring independent   Toileting independent   Bathing independent   Dressing independent   Eating independent   IADLs independent   Communication understands/communicates without difficulty   Swallowing swallows foods/liquids without difficulty   Baseline Diet/Method of Nutritional Intake no diet restrictions   Past History of Dysphagia no   Prior Level of Function Comment Per chart review: PMH SB6,  MT2,  No VFSS in EMR.  Pt seen by SLP at [place].   Assistive Device Currently Used at Home --  [Per chart review pt has RW at home - unclear if pt was using AD and pt unable to state PLOF at this time]             07/11/23 1334   SLP Time Calculation   Start Time 1330   Stop Time 1400   Time Calculation (min) 30 min   Session Details   Document Type Daily Treatment/Progress Note   Mode of Treatment speech language pathology;individual therapy   General Information   General Observations of Patient Pt received in her room, friend Annia was leaving. Handoff from/to 1:1 at beginning and end of session.   Motor  "Speech   Motor Speech Intervention articulation/speech intelligibility   Comment, Motor Speech Intervention Pt repeated single phonemes /a/, /i/, /u/, /m/ with 80% acc. given max cues and clinician models. Pt frustrated with inconsistency of vowel production and difficulty with articulatory placement. Pt produced single words throughout session (e.g., I, you, me, always, no, yes).   Auditory Comprehension   Auditory Comprehension Intervention comprehension of questions/sentences   Comment, Intervention (Auditory Comprehension) Pt engaged in conversation regarding preferred hobbies and food items. SLP provided visual stimuli along with verbal prompt (e.g., do you like The Beatles?\"). Following presentation of visual stimuli, pt changed response on approximately 25% of trials.   Reading Comprehension   Reading Comprehension Intervention oral reading skills   Comment, Intervention (Reading Comprehension) Given FO2 words (single syllable), pt identified correct words given verbal prompt on 3/3 occasions. Given FO3, breakdown observed with task and pt identified correct word on 1/4 occasions. Pt visibly frustrated with task.   Therapy Assessment/Plan (SLP)   Frequency of Treatment (SLP) 5-7 times per week   Intensity of Treatment (SLP) 60 minutes per day   Daily Progress Summary (SLP)   Daily Outcome Statement Fair participation. Session focus on language skills. Pt with difficulty matching spoken words to written words in FO3. Required max cues and repetition for imitation of single phonemes. Pt continues to become frustrated with communication breakdowns. ST to continue targeting communication and swallowing goals per POC.          IRF SLP Goals    Flowsheet Row Most Recent Value   Auditory Comprehension Goal 1    Auditory Comprehension Goal 1 pt will complete basic Aud comprehension tasks (simple yes/no 75%),  1 step commands 50% with max cues,  match word heard to picture f/o 2 50% max cues at 07/02/2023 1001   Time " Frame short-term goal (STG), 1 week at 07/02/2023 1001   Progress/Outcome goal met at 07/11/2023 0813   Auditory Comprehension Goal 2    Auditory Comprehension Goal 2 Pt will understand mod-complex level y/n questions 90% min cues,  2 step commands 75% min-mod cues,  at 07/02/2023 1001   Time Frame long-term goal (LTG), 4 weeks at 07/02/2023 1001   Progress/Outcome goal ongoing at 07/11/2023 0813   Verbal Expression Goal 1    Verbal Expression Goal 1 Pt will complete basic VE tasks (name. automatics, simple opposites, naming) with 20% accuracy and max cues. at 07/02/2023 1001   Time Frame short-term goal (STG), 1 week at 07/02/2023 1001   Progress/Outcome progress slower than expected at 07/11/2023 0813   Verbal Expression Goal 2    Verbal Expression Goal 2 Pt will complete Verbal expression tasks (basic-mod) with 50-75% accuracy and min-mod cues.  Pt will imitate vowels 50% max cues,  at 07/02/2023 1001   Time Frame long-term goal (LTG), 4 weeks at 07/02/2023 1001   Progress/Outcome goal ongoing at 07/11/2023 0813   Motor Speech/Voice Goal 1    Motor Speech/Voice Goal 1 Repetition of vowels, CV/VC and CVC targets with max cues and models for 60% acc. at 07/04/2023 1510   Time Frame short-term goal (STG), 2 weeks at 07/04/2023 1510   Progress/Outcome progress slower than expected at 07/11/2023 0813   Motor Speech/Voice Goal 2    Motor Speech/Voice Goal 2 Repetition of vowels, CV/VC and CVC targets with min cues and models for 90% acc. at 07/04/2023 1510   Time Frame long-term goal (LTG), 4 weeks at 07/04/2023 1510   Progress/Outcome goal ongoing at 07/11/2023 0813   Reading Comprehension Goal 1    Reading Comprehension Goal 1 Pt will match word to picture field of 2 with 50% accuracy and mod-max cues,  Pt will read single functional words with 20% accuracy max cues at 07/02/2023 1001   Time Frame short-term goal (STG), 1 week at 07/02/2023 1001   Progress/Outcome goal no longer appropriate at 07/04/2023 1510    Reading Comprehension Goal 2    Reading Comprehension Goal 2 Pt will read simple sentences and comprehend simple sentences with 75% accuracy min-mod cues at 07/02/2023 1001   Time Frame long-term goal (LTG) at 07/02/2023 1001   Progress/Outcome goal no longer appropriate at 07/04/2023 1510   Written Expression Goal 1    Written Expression Goal 1 Pt will copy first name, shapes, letters and 3-4 letter words with 80% min cues,  pt will copy letters, name shapes with 50% accuracy and mod cues. at 07/02/2023 1001   Time Frame short-term goal (STG), 1 week at 07/02/2023 1001   Progress/Outcome goal no longer appropriate at 07/04/2023 1510   Written Expression Goal 2    Written Expression Goal 2 Pt will write first and last name,  single words 90% accuracy at 07/02/2023 1001   Progress/Outcome goal no longer appropriate at 07/04/2023 1510   Oral Nutrition/Hydration Goal 1    Activity effective/safe, use of swallowing techniques, management of texture/viscosity, other (see comments)  [SB6,  MT2.  Trials of thins and various textures by speech with min s/s of aspiration] at 07/02/2023 1001   Time Frame short-term goal (STG), 14 days or less at 07/02/2023 1001   Progress/Outcome goal met at 07/11/2023 0813   Oral Nutrition/Hydration Goal 2    Activity effective/safe, oral nutrition/hydration, use of swallowing techniques, management of texture/viscosity, other (see comments)  [regular and thins] at 07/02/2023 1001   Time Frame long-term goal (LTG), 4 weeks at 07/02/2023 1001   Progress/Outcome continuing progress toward goal at 07/11/2023 0813   Oral Motor Exercise Goal 1    Activity perform oral motor strengthening exercises, facial/cheek, lip, tongue, strength/ROM, mastication/bolus manipulation, 5-10 times per session, other (see comments)  [use mirror/visual model] at 07/02/2023 1001   [place] with 1:1 supervision/constant cues at 07/02/2023 1001   Time Frame short-term goal (STG), 14 days or less at 07/02/2023  1001   Progress/Outcome goal met at 07/11/2023 0813   Oral Motor Exercise Goal 2    Activity perform oral motor strengthening exercises, facial/cheek, lip, tongue, strength/ROM, mastication/bolus manipulation, other (see comments)  [2-3 times a day sets of 10-15] at 07/02/2023 1001   [place] with minimal cues (75-90% accuracy) at 07/02/2023 1001   Time Frame long-term goal (LTG), 4 weeks at 07/02/2023 1001   Progress/Outcome goal ongoing at 07/11/2023 0813

## 2023-07-11 NOTE — PROGRESS NOTES
Patient: Jennifer Sams  Location: [facility] [address]  MRN: [id number]  Today's date: 7/11/2023    History of Present Illness  Jennifer is a 78 y.o. female admitted on 7/1/2023 with Acute ischemic left MCA stroke (CMS/HCC) [I63.512]. Principal problem is Acute ischemic left MCA stroke (CMS/HCC).    The patient is a 78-year-old  female with a history of AAA, heart block s/p cardiac pacemaker, glaucoma, hyperlipidemia, coronary artery disease, NSTEMI, who presented to [facility] on June 26 after she has not been seen by her friends for a few days.  Patient lives alone and when she was found by EMS she was confused and combative.  In the emergency department she was aphasic with right-sided weakness and hypertensive to the 190 systolic.  CT scan of the head revealed an acute infarct in left MCA with mild bleeding.  Echo showed an EF of 43% and a bubble study was negative.  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  Later this was transitioned to apixaban.  She was continued on statin for secondary prevention.  She was evaluated by speech therapy and cleared for a soft and bite-size diet with moderately thick liquids.    7/2/2023:  Diet SB6 with mildly thick liquids (MT2)  7/6/2023: Upgraded to EC7 with thin liquids    Past Medical History  Jennifer has a past medical history of AAA (abdominal aortic aneurysm), Arthritis, Elevated blood pressure reading without diagnosis of hypertension (4/19/2019), Epistaxis (1/6/2020), GERD (gastroesophageal reflux disease) (4/19/2019), Glaucoma (4/19/2019), Heart murmur, Hiatal hernia, History of hip replacement, total, right (4/19/2019), History of rheumatic fever as a child (4/19/2019), Hypercholesterolemia (4/19/2019), Ischemic cardiomyopathy (5/9/2019), Kidney cysts, Osteoarthritis of multiple joints (4/19/2019), Osteoporosis, Pacemaker (12/9/2019),  Raynaud's disease (4/19/2019), RSD (reflex sympathetic dystrophy) (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        PT Vitals    Date/Time Pulse BP BP Location BP Method Pt Position [facility]   07/11/23 1407 96 111/76 Left upper arm Automatic Sitting LBR   07/11/23 1420 103 110/57 Left upper arm Automatic Sitting LBR      PT Pain    Date/Time Pain Type Rating: Rest Rating: Activity [facility]   07/11/23 1407 Pain Assessment 0 - no pain 0 - no pain LBR   07/11/23 1420 Post Activity 0 - no pain 0 - no pain LBR          Prior Living Environment    Flowsheet Row Most Recent Value   People in Home alone   Current Living Arrangements home   Home Accessibility --  [2 SH with 1 DARÍO]   Living Environment Comment Per chart review, pt lives alone in a 2SH with 1STE   Number of Stairs, Main Entrance 1   Number of Stairs, Within Home, Primary 12  [Full fight to second floor]          Prior Level of Function    Flowsheet Row Most Recent Value   Dominant Hand --  [Unable to determine 2/2 cognitive/communication deficits]   Ambulation independent   Transferring independent   Toileting independent   Bathing independent   Dressing independent   Eating independent   IADLs independent   Communication understands/communicates without difficulty   Swallowing swallows foods/liquids without difficulty   Baseline Diet/Method of Nutritional Intake no diet restrictions   Past History of Dysphagia no   Prior Level of Function Comment Per chart review: PMH SB6,  MT2,  No VFSS in EMR.  Pt seen by SLP at [place].   Assistive Device Currently Used at Home --  [Per chart review pt has RW at home - unclear if pt was using AD and pt unable to state PLOF at this time]           IRF PT Evaluation and Treatment - 07/11/23 1407        PT Time Calculation    Start Time 1400     Stop Time 1430     Time Calculation (min) 30 min        Session Details    Document Type Daily  "Treatment/Progress Note     Mode of Treatment physical therapy;individual therapy        Mobility Belt    Mobility Belt Used for All Out of Bed Activity no     Reason Mobility Belt Not Used patient refused        Sit to Stand Transfer    [place], Sit to Stand Transfer supervision     Safety/Cues impulsivity     Assistive Device none     Comment from mat        Stand to Sit Transfer    [place], Stand to Sit Transfer supervision     Safety/Cues impulsivity     Assistive Device none     Comment to mat        Stand Pivot Transfer    [place], Stand Pivot/Stand Step Transfer close supervision     Safety/Cues impulsivity     Assistive Device none     Comment amb approach        Gait Training    [place], Gait close supervision     Safety/Cues impulsivity     Assistive Device none     Distance in Feet 300 feet     Pattern step-through     Deviations/Abnormal Patterns gait speed decreased;scissoring     Bilateral Gait Deviations heel strike decreased     Comment (Gait/Stairs) 1 gait trial as noted above: cl S for safety        Curb Negotiation    [place] close supervision     Assistive Device none     Curb Height 6 inches     Comment min cueing for safety        Sloped Surface Gait Skills    [place] close supervision     Assistive Device none     Distance in Feet 5 feet     Comment indoor ramp        Stairs Training    [place], Stairs close supervision     Safety/Cues impulsivity     Assistive Device railing     Handrail Location (Stairs) left side (ascending);left side (descending)     Number of Stairs 16     Stair Height 7 inches     Ascending Stairs Technique step-over-step     Descending Stairs Technique step-over-step        Balance    Static Standing Balance unsupported;mild impairment     Dynamic Standing Balance mild impairment;unsupported     Comment, Balance Standing on blue foam: steadying assist for balance with ball toss.  Standing on blue foam: toe taps to an 8\" block x 10 reps " B: steadying assist for balance with LHHA.        Daily Progress Summary (PT)    Daily Outcome Statement Pt continued to require encouragement for participation in her session, but was more cooperative with balance and mobility tasks as noted above.  Pt directly handed off to 1:1 at end of session.                           IRF PT Goals    Flowsheet Row Most Recent Value   Bed Mobility Goal 1    Activity/Assistive Device bed mobility activities, all at 07/02/2023 0830   [place] modified independence at 07/11/2023 0827   Time Frame 1 week, short-term goal (STG) at 07/11/2023 0827   Strategies/Barriers not reassessed at 07/05/2023 0835   Progress/Outcome goal revised this date at 07/11/2023 0827   Bed Mobility Goal 2    Activity/Assistive Device bed mobility activities, all at 07/02/2023 0830   [place] modified independence at 07/02/2023 0830   Time Frame 1 week, short-term goal (STG) at 07/11/2023 0827   Strategies/Barriers STG=LTG at 07/11/2023 0827   Progress/Outcome goal ongoing at 07/11/2023 0827   Transfer Goal 1    Activity/Assistive Device sit-to-stand/stand-to-sit, bed-to-chair/chair-to-bed, stand pivot at 07/02/2023 0830   [place] supervision required at 07/02/2023 0830   Time Frame 3 - 5 days, short-term goal (STG) at 07/11/2023 0827   Strategies/Barriers steadying assist for safety at 07/05/2023 0835   Progress/Outcome goal ongoing at 07/11/2023 0827   Transfer Goal 2    Activity/Assistive Device sit-to-stand/stand-to-sit, bed-to-chair/chair-to-bed, stand pivot at 07/02/2023 0830   [place] modified independence at 07/02/2023 0830   Time Frame 1 week, long-term goal (LTG) at 07/11/2023 0827   Progress/Outcome goal ongoing at 07/11/2023 0827   Gait/Walking Locomotion Goal 1    Activity/Assistive Device gait (walking locomotion) at 07/02/2023 0830   Distance 250 feet at 07/02/2023 0830   [place] supervision required at 07/02/2023 0830   Time Frame 3 - 5 days, short-term goal (STG) at  07/11/2023 0827   Strategies/Barriers cl S at 07/11/2023 0827   Progress/Outcome goal ongoing at 07/11/2023 0827   Gait/Walking Locomotion Goal 2    Activity/Assistive Device gait (walking locomotion) at 07/02/2023 0830   Distance 500 feet at 07/02/2023 0830   [place] supervision required at 07/02/2023 0830   Time Frame 1 week, long-term goal (LTG) at 07/11/2023 0827   Progress/Outcome goal ongoing at 07/11/2023 0827   Wheelchair Locomotion Goal 1    Activity wheelchair mobility skills, all at 07/02/2023 0830   Assistive Device manual, lightweight at 07/02/2023 0830   Distance 50 feet at 07/02/2023 0830   [place] minimum assist (75% or more patient effort) at 07/02/2023 0830   Time Frame 1 week, short-term goal (STG) at 07/05/2023 0835   Strategies/Barriers pt refuses to sit in w/c at 07/11/2023 0827   Progress/Outcome goal no longer appropriate at 07/11/2023 0827   Wheelchair Locomotion Goal 2    Activity wheelchair mobility skills, all at 07/02/2023 0830   Assistive Device manual, lightweight at 07/02/2023 0830   Distance 200 feet at 07/05/2023 0835   [place] modified independence at 07/05/2023 0835   Time Frame 2 weeks, long-term goal (LTG) at 07/05/2023 0835   Progress/Outcome goal no longer appropriate at 07/11/2023 0827   Stairs Goal 1    Activity/Assistive Device stairs, all skills at 07/02/2023 0830   Number of Stairs 16 at 07/02/2023 0830   [place] supervision required at 07/02/2023 0830   Time Frame 3 - 5 days, short-term goal (STG) at 07/11/2023 0827   Strategies/Barriers cl S at 07/11/2023 0827   Progress/Outcome goal ongoing at 07/11/2023 0827   Stairs Goal 2    Activity/Assistive Device stairs, all skills at 07/02/2023 0830   Number of Stairs 24 at 07/02/2023 0830   [place] supervision required at 07/02/2023 0830   Time Frame 1 week, long-term goal (LTG) at 07/11/2023 0827   Progress/Outcome goal ongoing at 07/11/2023 0827

## 2023-07-11 NOTE — PROGRESS NOTES
CREATIVE ARTS SESSION - MUSIC THERAPY        Patient:  Jennifer Sams  Location:  [facility] [address]  MRN:  [id number]  Today's date:  7/11/2023      Admitting Diagnosis: Acute ischemic left MCA stroke (CMS/HCC) [I63.512]       Session Details:   Session Type: Individual  Co-treatment: No  Reason for Referral:  Non-verbal    Start time: 1300    End time:  1305   Total Time:  5'       General Observations of Patient:   Start: Pt received supine in bed; patient declined services.  End: Pt supine in bed at end of session; all needs met and 1:1 staff present and guest present      OBJECTIVE     Session Goals:   Support the processing of thoughts and feelings     Affect/Mood:      [x] Calm  [] Anxious  [] Sad   [] Hopeful  [] Hopeless  [] Tearful   [x] Bright  [] Agitated  [] Other (comment):        Music Preferences / Music Experiences:  N/A      Interventions provided during session:   Verbal processing      Patient Response to Interventions:   Physical response:  Nodded head, Smiled        ASSESSMENT AND PLAN       Progress Summary:   Pt was met in room and was provided choice of receiving services.  Pt declined making hand gestures of gratitude. Pt was provided summary of services and was left with 1:1 staff and guest.      Follow Up:  Plan of care discontinued for music therapy services. Therapist will no longer see pt as they are discharging this week. Therapist believes they would be a good candidate for music therapy in the future.       Kristi Nettles M.A., MT-BC

## 2023-07-11 NOTE — PATIENT CARE CONFERENCE
Inpatient Rehabilitation Team Conference Note  Date: 7/11/2023  Time: 9:14 AM       Patient Name: Jennifer Sams     Medical Record Number: [id number]   YOB: 1945  Sex: Female      Room/Bed: 206/206W  Payor Info: Payor: MEDICARE / Plan: MEDICARE PART A & B / Product Type: Medicare /     Admitting Diagnosis: Acute ischemic left MCA stroke (CMS/[facility]) [I63.512]   Admit Date/Time: 7/1/2023 10:47 AM    Principal Problem: Acute ischemic left MCA stroke (CMS/[facility])    Patient Active Problem List    Diagnosis Date Noted   • Acute ischemic left MCA stroke (CMS/[facility]) 07/01/2023   • Follow-up exam 07/01/2023   • Right wrist drop 07/01/2023   • Left ventricular apical thrombus 06/28/2023   • Cerebrovascular accident (CVA), unspecified mechanism (CMS/[facility]) 06/26/2023   • Elevated blood pressure reading with diagnosis of hypertension 06/26/2023   • Lumbar degenerative disc disease 10/20/2022   • Lumbar facet arthropathy 10/20/2022   • Mixed hyperlipidemia 04/21/2022   • Dizziness 04/21/2022   • Coronary artery disease involving native heart without angina pectoris 04/09/2022   • Chronic combined systolic and diastolic heart failure (CMS/[facility]) 02/17/2022   • Other headache syndrome 02/17/2022   • Seasonal allergies 12/13/2021   • Raynaud's phenomenon 04/16/2021   • Dyspnea on exertion 01/26/2021   • Chest pain on breathing 01/26/2021   • Epistaxis 01/06/2020   • SOB (shortness of breath) 12/10/2019   • Cardiac pacemaker 12/09/2019   • Mobitz type 2 second degree atrioventricular block 11/16/2019   • Coronary artery disease involving native coronary artery of native heart with angina pectoris (CMS/[facility]) 11/13/2019   • Chest pain 11/12/2019   • Ischemic cardiomyopathy 05/09/2019   • Status post insertion of drug eluting coronary artery stent 05/09/2019   • NSTEMI (non-ST elevated myocardial infarction) (CMS/[facility]) 04/26/2019   • Hypercholesterolemia 04/19/2019   • Pseudoclaudication syndrome 04/19/2019   • History of  rheumatic fever as a child 04/19/2019   • Glaucoma 04/19/2019   • RSD (reflex sympathetic dystrophy) 04/19/2019   • Raynaud's disease 04/19/2019   • Osteoarthritis of multiple joints 04/19/2019   • History of hip replacement, total, right 04/19/2019   • GERD (gastroesophageal reflux disease) 04/19/2019   • Elevated blood pressure reading without diagnosis of hypertension 04/19/2019   • Abdominal aortic aneurysm (AAA) without rupture (CMS/[facility]) 04/19/2019       Premorbid Status:  Dominant Hand: -- (Unable to determine 2/2 cognitive/communication deficits)  Ambulation: independent  Transferring: independent  Toileting: independent  Bathing: independent  Dressing: independent  Eating: independent  IADLs: independent  Communication: understands/communicates without difficulty  Swallowing: swallows foods/liquids without difficulty  Baseline Diet/Method of Nutritional Intake: no diet restrictions  Past History of Dysphagia: no  Assistive Device/Animal Currently Used at Home: -- (Per chart review pt has RW at home - unclear if pt was using AD and pt unable to state PLOF at this time)  Prior Level of Function Comment: Per chart review: PMH SB6; MT2; No VFSS in EMR.  Pt seen by SLP at [facility].      Current Functional Status:  Mobility  Gait  [place], Gait: close supervision  Assistive Device: none  Comment (Gait/Stairs): varying gait speeds and pt easily distracted by staff or looking around unit while ambulation but no LOB. Pt at times interlocking UEs with PT however pt not providing any assistance for balance. Attempted second walk post rest break however declining after feeling tired after walking to doorway in room.    Stairs  [place], Stairs: close supervision  Assistive Device: railing  Handrail Location (Stairs): left side (ascending); left side (descending)  Number of Stairs: 16  Stair Height: 7 inches  Comment: 2x8 steps only due to increased fatigue    Wheelchair  Method of Locomotion: bipedal (lower  extremity) propulsion  [place], Forward Propulsion: close supervision  [place], Steering Activities: close supervision  [place], Turning Activities: close supervision  Distance Propelled in Feet: 50 feet  Comment, Wheelchair Mobility: Pt repositioned into a hemiheight w/c and propulsion initiated as above with max cueing for safety.    Transfers  Bed Mobility  Bed Mobility Activities: left; supine to sit; sit to supine  [place], Roll Left: close supervision  [place], Roll Right: close supervision  [place], Supine to Sit: supervision  [place], Sit to Supine: supervision  Safety/Cues: maximal; impulsivity; sequencing  Assistive Device: bed rails; head of bed elevated  Comment: in hospital bed, multiple times throughout session    Bed to Chair Transfer  [place], Bed to Chair: touching/steadying assist  Safety/Cues: impulsivity; technique  Assistive Device: -- (none)  Comment: SPT between EOB and w/c; steadying assist for balance    Chair to Bed Transfer  [place], Chair to Bed: touching/steadying assist  Safety/Cues: impulsivity; technique  Assistive Device: none  Comment: SPT between w/c and EOB; steadying assist for balance    Sit to Stand Transfer  [place], Sit to Stand Transfer: close supervision  Safety/Cues: impulsivity  Assistive Device: none  Comment: from hospital bed and EOM    Stand to Sit Transfer  [place], Stand to Sit Transfer: close supervision  Safety/Cues: impulsivity  Assistive Device: none  Comment: to wc/mat    Stand Pivot Transfer  [place], Stand Pivot/Stand Step Transfer: close supervision  Safety/Cues: impulsivity  Assistive Device: none  Comment: via amb approach    Car Transfer  Transfer Technique: stand pivot  [place]: close supervision  Safety/Cues: technique  Assistive Device: none  Comment: amb approach      Toilet Transfer  Transfer Technique: stand pivot  [place]: close supervision  Safety/Cues: impulsivity  Assistive  Device: grab bars/safety frame  Comment: amb approach    Shower Transfer  Transfer Technique: stand pivot  [place]: touching/steadying assist  Safety/Cues: impulsivity; technique  Assistive Device: shower chair; grab bars/tub rail  Comment: Via amb approach onto shower chair in barrier free shower stall.      Self Care  Bathing  Tasks: chest; right arm; abdomen; front perineal area; buttocks; left upper leg; right upper leg; left lower leg, including foot; right lower leg, including foot  [place]: minimum assist (75% or more patient effort)  Safety/Cues: impulsivity; attention; malaika/one-handed technique; sequencing; compensatory techniques  Setup Assistance: adaptive equipment setup; adjust water temperature/flow; obtain supplies; open containers  Adaptive Equipment: shower chair; grab bar/tub rail; hand-held shower spray hose  Comment: Noted pt required less cues to remain seated on shower chair in comparison to last shower, however still requires cues for safety and to sit on shower chair t/o 2/2 impulsivity and decreased safety awareness. Assist to bathe/dry LUE. Steadying A for balance while in stance.    Upper Body Dressing  Tasks: don; pull over garment  [place]: touching/steadying assist  Safety/Cues: impulsivity  Adaptive Equipment: none  Comment: Pt completed amb level clothing retrieval prior to dressing c Steadying A s AD. Then sat EOB to don shirt c Touching A to adjust on R side.    Lower Body Dressing  Tasks: don; pants/bottoms; underwear; socks  [place]: touching/steadying assist  Safety/Cues: impulsivity  Adaptive Equipment: none  Comment: Pt donned bilateral slip on shoes while seated EOB c Cl S.    Grooming  Tasks: oral care (brushing teeth, cleaning dentures)  [place]: minimum assist (75% or more patient effort)  Safety/Cues: problem-solving; sequencing; termination; visual/perceptual; malaika/one-handed technique; initiation  Setup Assistance: obtain supplies  Adaptive  Equipment: none  Comment: Pt stood at sink to complete oral care with Cl S for safety, no AD and [place] assist to initiate using malaika technique when opening toothpaste and using toothbrush. Pt however resistant and insisted on using L hand to complete. Max multimodal cues required for sequencing, R side attention, intiation/termination of oral care (pt attempted to squirt toothpaste in mouth directly, rinse mouth before brushing, etc.)    Toileting  Tasks: adjust/manage clothing; perform bladder hygiene  [place]: touching/steadying assist  Safety/Cues: impulsivity  Adaptive Equipment: none  Comment: Continent of bladder while seated on toilet. Steadying A while in stance for clothing management.    Self-Feeding  [place]: set up; supervision  Setup Assistance: prepare tray/open items  Adaptive Equipment: none  Comment: Pt drank 75% of an orange juice during session. OT encouraging fluids/intake t/o. Pt able to bring cup to mouth c L UE without spillage. Pt required assist to open container 2/2 R UE decreaed distal ROM.    Cognition  Affect/Mental Status: anxious  Behavioral Issues: overwhelmed easily  Orientation Status: unable/difficult to assess; other (see comments) (unable to assess due to language deficits)  Follows Commands: follows one-step commands; 50-74% accuracy; delayed response/completion; increased processing time needed; initiation impaired; repetition of directions required; verbal cues/prompting required  Cognitive Function: attention deficit; executive function deficit; safety deficit  Attention Deficit: minimal deficit  Comment, Attention: Pt initially resistant to taking meds with nursing; but was able to be complete task.  Pt became frustrated when therapist did not understand her and shut down.  Pt provided with a few minute break; pt put on her socks [place] during that time.  Executive Function Deficit: severe deficit; judgment; planning/decision-making; self-monitoring/self-correction;  impulse control; organization/sequencing  Safety Deficit: impulsivity  Comment, Cognition: Pt c increased frustration and overall decreased motivation/participation in therapy this date. Pt requiring Max enouragement to transfer OOB and participate, eventually agreeable to amb within unit hallways to get orange juice. Pt emotionally labile t/o, tearful 2/2 increased frustration and then more cheeful when saying hello to other staff members in the hallway. OT providing therapeutic listening and empathetic use of self t/o.  Comment: unable to compelte due to severity of lanaguage deficits.    Communication  Speech Intelligibility (Motor Speech): word level  Word Level, Speech Intelligibility (Motor Speech): minimal impairment  Phrase/Sentence Level, Speech Intelligibility (Motor Speech): impaired; minimal impairment  Articulation (Motor Speech): imprecise articulation  Comment, Motor Speech Intervention: Pt refused any motor speech tasks despite max cues.  Pt frequently verbalizing and gesturing no.  Follows Commands (Auditory Comprehension): 1-step command  Yes/No Questions (Auditory Comprehension): biographical/personal questions  Biographical/Personal Questions (Auditory Comprehension): 75-90% accuracy  Comment, Assessment (Auditory Comprehension): Lynn Cesars: Aud comprehension 16/24 (simple y/n 8/12; simple commands 2/4, id pictures by description 6/6; id object function 0/2)      OT Frequency: 5-7 times per week  OT Intensity: 1 hour  PT Frequency: 5-7 times per week  PT Intensity: 1 hour  SLP Frequency: 5-7 times per week  SLP Intensity: 1 hour         Weekly Outcome Summaries:  Weekly Progress Summary (PT)  Progress Toward Functional Goals (PT): progressing toward functional goals as expected  Weekly Outcome Statement: Pt is making slow steady progress in therapy currently functioning at a cl S/steadying level with all her upright mobility without an AD.  Pt's MCGRATH was assessed at 33/56 which places pt  at increased risk of falls.  Additionally, she is limited by impaired cognition/communication with poor safety awareness and impaired judgment, with a 1:1 required ATC. Pt demonstrates a flat affect and frequently requires encouragement for participation, with pt endorsing increased fatigue on 7/10.   Anticipate the need for S at d/c due to her impaired cogntion.  Education with pt's POA scheduled for 7/13.    Weekly Progress Summary (SLP)  Weekly Outcome Statement: ST focused on motor speech, language, and dysphagia impairments. Verbal output limited by severity of apraxia. Pt with improved comprehension of y/n questions and word/picture matching. Upgraded to EC7/thins diet, required min cues for swallowing strategies to clear residue. Limited by poor intake.  Barriers to Overall Progress (SLP): severity of aphasia/apraxia, poor safety awareness, poor PO intake  Impairments Continuing to Limit Function: impaired cognition; impaired communication; impaired safety awareness; impaired swallowing  Recommendations (SLP): Pt will benefit from a course of skilled ST to improve cognitive-communication to a level of partial mod.    Weekly Outcome Summary (Interdisciplinary)  Weekly Progress Summary: progressing toward goals slower than expected  Weekly Outcome Statement: Pt is alert; continues to present with non-fluent global aphasia characterized by inconsistent yes/no responses and 1 step command following, anomia, and receptive and expressive language deficits.  Temporal and spatial orientation as well as autobio information remains difficult to obtain secondary to aphasia.  Insight, judgment, and safety are deemed impaired.  Affect was WNR; pt denies distress or pain. She endorsed feelings of depression and anxiety related to her stroke (thumbs up).  Appetite remains decreased as well as interest in food intake.  Attempted to review pt's daily food choices prior to the stroke.  Pt denies SI or passive/active death  wish.  She endorsed feeling tired and per 1:1, she slept in this morning but participated in her therapies.  Reinforced importance of participating in her rehab to optimize her recovery.  Pt responded positively to support and encouragement.  Continue to monitor mood, affective functioning, and language/communication.        Problem Resolution:  Team Plan to Resolve Problems: ST spoke to family about food items they could bring in for her to encourage appetite.  PT/OT providing encouragement and support.  Psych providing support and encouragement for therapy participation.  Team Weekly Outcome Statement: Medically she is stable.  Limited by frustration and restlessness.  Nursing says agitated at times.  Poor appetite.  PT says mood and safety are barriers.  OT says mood declining and hard to encourage.  ST says barriers are severity of apraxia and aphasia.  Psych says consulted for mood difficulty.         Expected Level of Function  Self-Care: Supervision or touching assistance  Sphincter Control: Independent  Transfers: Supervision or touching assistance  Locomotion: Supervision or touching assistance  Communication: Partial/moderate assistance  Social Cognition: Partial/moderate assistance      Goals/Support System:  Patient/Family Goals  Patient's Goals For Discharge: other (see comments) (pt unable to state goals due to language deficits)  Family/Support System  Caregiver Engagement: advocates for the patient  Family/Support System Care: support provided, self-care encouraged    IRF PT Goals    Flowsheet Row Most Recent Value   Bed Mobility Goal 1    Activity/Assistive Device bed mobility activities, all at 07/02/2023 0830   [place] modified independence at 07/11/2023 0827   Time Frame 1 week, short-term goal (STG) at 07/11/2023 0827   Strategies/Barriers not reassessed at 07/05/2023 0835   Progress/Outcome goal revised this date at 07/11/2023 0827   Bed Mobility Goal 2    Activity/Assistive Device bed  mobility activities, all at 07/02/2023 0830   [place] modified independence at 07/02/2023 0830   Time Frame 1 week, short-term goal (STG) at 07/11/2023 0827   Strategies/Barriers STG=LTG at 07/11/2023 0827   Progress/Outcome goal ongoing at 07/11/2023 0827   Transfer Goal 1    Activity/Assistive Device sit-to-stand/stand-to-sit, bed-to-chair/chair-to-bed, stand pivot at 07/02/2023 0830   [place] supervision required at 07/02/2023 0830   Time Frame 3 - 5 days, short-term goal (STG) at 07/11/2023 0827   Strategies/Barriers steadying assist for safety at 07/05/2023 0835   Progress/Outcome goal ongoing at 07/11/2023 0827   Transfer Goal 2    Activity/Assistive Device sit-to-stand/stand-to-sit, bed-to-chair/chair-to-bed, stand pivot at 07/02/2023 0830   [place] modified independence at 07/02/2023 0830   Time Frame 1 week, long-term goal (LTG) at 07/11/2023 0827   Progress/Outcome goal ongoing at 07/11/2023 0827   Gait/Walking Locomotion Goal 1    Activity/Assistive Device gait (walking locomotion) at 07/02/2023 0830   Distance 250 feet at 07/02/2023 0830   [place] supervision required at 07/02/2023 0830   Time Frame 3 - 5 days, short-term goal (STG) at 07/11/2023 0827   Strategies/Barriers cl S at 07/11/2023 0827   Progress/Outcome goal ongoing at 07/11/2023 0827   Gait/Walking Locomotion Goal 2    Activity/Assistive Device gait (walking locomotion) at 07/02/2023 0830   Distance 500 feet at 07/02/2023 0830   [place] supervision required at 07/02/2023 0830   Time Frame 1 week, long-term goal (LTG) at 07/11/2023 0827   Progress/Outcome goal ongoing at 07/11/2023 0827   Wheelchair Locomotion Goal 1    Activity wheelchair mobility skills, all at 07/02/2023 0830   Assistive Device manual, lightweight at 07/02/2023 0830   Distance 50 feet at 07/02/2023 0830   [place] minimum assist (75% or more patient effort) at 07/02/2023 0830   Time Frame 1 week, short-term goal (STG) at 07/05/2023 0835    Strategies/Barriers pt refuses to sit in w/c at 07/11/2023 0827   Progress/Outcome goal no longer appropriate at 07/11/2023 0827   Wheelchair Locomotion Goal 2    Activity wheelchair mobility skills, all at 07/02/2023 0830   Assistive Device manual, lightweight at 07/02/2023 0830   Distance 200 feet at 07/05/2023 0835   [place] modified independence at 07/05/2023 0835   Time Frame 2 weeks, long-term goal (LTG) at 07/05/2023 0835   Progress/Outcome goal no longer appropriate at 07/11/2023 0827   Stairs Goal 1    Activity/Assistive Device stairs, all skills at 07/02/2023 0830   Number of Stairs 16 at 07/02/2023 0830   [place] supervision required at 07/02/2023 0830   Time Frame 3 - 5 days, short-term goal (STG) at 07/11/2023 0827   Strategies/Barriers cl S at 07/11/2023 0827   Progress/Outcome goal ongoing at 07/11/2023 0827   Stairs Goal 2    Activity/Assistive Device stairs, all skills at 07/02/2023 0830   Number of Stairs 24 at 07/02/2023 0830   [place] supervision required at 07/02/2023 0830   Time Frame 1 week, long-term goal (LTG) at 07/11/2023 0827   Progress/Outcome goal ongoing at 07/11/2023 0827        IRF OT Goals    Flowsheet Row Most Recent Value   Transfer Goal 1    Activity/Assistive Device toilet at 07/02/2023 0711   [place] --  [Cl S] at 07/02/2023 0711   Time Frame short-term goal (STG), 5 - 7 days at 07/05/2023 0702   Progress/Outcome goal ongoing, goal revised this date at 07/05/2023 0702   Transfer Goal 2    Activity/Assistive Device toilet at 07/02/2023 0711   [place] supervision required at 07/02/2023 0711   Time Frame long-term goal (LTG), 14 days or less at 07/02/2023 0711   Progress/Outcome goal ongoing at 07/05/2023 0702   Transfer Goal 3    Activity/Assistive Device shower at 07/02/2023 0711   [place] --  [Cl S] at 07/02/2023 0711   Time Frame short-term goal (STG), 5 - 7 days at 07/02/2023 0711   Progress/Outcome goal ongoing, goal revised this date at  07/05/2023 0702   Transfer Goal 4    Activity/Assistive Device shower at 07/02/2023 0711   [place] supervision required at 07/02/2023 0711   Time Frame long-term goal (LTG), 14 days or less at 07/02/2023 0711   Progress/Outcome goal ongoing at 07/05/2023 0702   Bathing Goal 1    [place] --  [Steadying A] at 07/02/2023 0711   Time Frame short-term goal (STG), 5 - 7 days at 07/05/2023 0702   Progress/Outcome goal ongoing, goal revised this date at 07/05/2023 0702   Bathing Goal 2    [place] supervision required at 07/02/2023 0711   Time Frame long-term goal (LTG), 14 days or less at 07/02/2023 0711   Progress/Outcome goal ongoing at 07/05/2023 0702   UB Dressing Goal 1    [place] --  [Cl S] at 07/05/2023 0702   Time Frame short-term goal (STG), 5 - 7 days at 07/05/2023 0702   UB Dressing Goal 2    [place] set-up required at 07/02/2023 0711   Time Frame long-term goal (LTG), 14 days or less at 07/02/2023 0711   Progress/Outcome goal ongoing at 07/05/2023 0702   LB Dressing Goal 1    [place] --  [Cl S] at 07/05/2023 0702   Time Frame short-term goal (STG), 5 - 7 days at 07/05/2023 0702   LB Dressing Goal 2    [place] supervision required at 07/02/2023 0711   Time Frame long-term goal (LTG), 14 days or less at 07/02/2023 0711   Progress/Outcome goal ongoing at 07/05/2023 0702   Grooming Goal 1    [place] --  [Steadying A] at 07/05/2023 0702   Time Frame short-term goal (STG), 5 - 7 days at 07/05/2023 0702   Progress/Outcome goal revised this date at 07/05/2023 0702   Grooming Goal 2    [place] set-up required at 07/02/2023 0711   Time Frame long-term goal (LTG), 14 days or less at 07/02/2023 0711   Progress/Outcome goal ongoing at 07/05/2023 0702   Toileting Goal 1    [place] --  [Cl S] at 07/05/2023 0702   Time Frame short-term goal (STG), 5 - 7 days at 07/05/2023 0702   Progress/Outcome goal met, goal revised this date at 07/05/2023 0702   Toileting Goal 2     [place] supervision required at 07/02/2023 0711   Time Frame long-term goal (LTG), 14 days or less at 07/02/2023 0711   Progress/Outcome goal ongoing at 07/05/2023 0702        IRF SLP Goals    Flowsheet Row Most Recent Value   Auditory Comprehension Goal 1    Auditory Comprehension Goal 1 pt will complete basic Aud comprehension tasks (simple yes/no 75%),  1 step commands 50% with max cues,  match word heard to picture f/o 2 50% max cues at 07/02/2023 1001   Time Frame short-term goal (STG), 1 week at 07/02/2023 1001   Progress/Outcome goal met at 07/11/2023 0813   Auditory Comprehension Goal 2    Auditory Comprehension Goal 2 Pt will understand mod-complex level y/n questions 90% min cues,  2 step commands 75% min-mod cues,  at 07/02/2023 1001   Time Frame long-term goal (LTG), 4 weeks at 07/02/2023 1001   Progress/Outcome goal ongoing at 07/11/2023 0813   Verbal Expression Goal 1    Verbal Expression Goal 1 Pt will complete basic VE tasks (name. automatics, simple opposites, naming) with 20% accuracy and max cues. at 07/02/2023 1001   Time Frame short-term goal (STG), 1 week at 07/02/2023 1001   Progress/Outcome progress slower than expected at 07/11/2023 0813   Verbal Expression Goal 2    Verbal Expression Goal 2 Pt will complete Verbal expression tasks (basic-mod) with 50-75% accuracy and min-mod cues.  Pt will imitate vowels 50% max cues,  at 07/02/2023 1001   Time Frame long-term goal (LTG), 4 weeks at 07/02/2023 1001   Progress/Outcome goal ongoing at 07/11/2023 0813   Motor Speech/Voice Goal 1    Motor Speech/Voice Goal 1 Repetition of vowels, CV/VC and CVC targets with max cues and models for 60% acc. at 07/04/2023 1510   Time Frame short-term goal (STG), 2 weeks at 07/04/2023 1510   Progress/Outcome progress slower than expected at 07/11/2023 0813   Motor Speech/Voice Goal 2    Motor Speech/Voice Goal 2 Repetition of vowels, CV/VC and CVC targets with min cues and models for 90% acc. at 07/04/2023  1510   Time Frame long-term goal (LTG), 4 weeks at 07/04/2023 1510   Progress/Outcome goal ongoing at 07/11/2023 0813   Reading Comprehension Goal 1    Reading Comprehension Goal 1 Pt will match word to picture field of 2 with 50% accuracy and mod-max cues,  Pt will read single functional words with 20% accuracy max cues at 07/02/2023 1001   Time Frame short-term goal (STG), 1 week at 07/02/2023 1001   Progress/Outcome goal no longer appropriate at 07/04/2023 1510   Reading Comprehension Goal 2    Reading Comprehension Goal 2 Pt will read simple sentences and comprehend simple sentences with 75% accuracy min-mod cues at 07/02/2023 1001   Time Frame long-term goal (LTG) at 07/02/2023 1001   Progress/Outcome goal no longer appropriate at 07/04/2023 1510   Written Expression Goal 1    Written Expression Goal 1 Pt will copy first name, shapes, letters and 3-4 letter words with 80% min cues,  pt will copy letters, name shapes with 50% accuracy and mod cues. at 07/02/2023 1001   Time Frame short-term goal (STG), 1 week at 07/02/2023 1001   Progress/Outcome goal no longer appropriate at 07/04/2023 1510   Written Expression Goal 2    Written Expression Goal 2 Pt will write first and last name,  single words 90% accuracy at 07/02/2023 1001   Progress/Outcome goal no longer appropriate at 07/04/2023 1510   Oral Nutrition/Hydration Goal 1    Activity effective/safe, use of swallowing techniques, management of texture/viscosity, other (see comments)  [SB6,  MT2.  Trials of thins and various textures by speech with min s/s of aspiration] at 07/02/2023 1001   Time Frame short-term goal (STG), 14 days or less at 07/02/2023 1001   Progress/Outcome goal met at 07/11/2023 0813   Oral Nutrition/Hydration Goal 2    Activity effective/safe, oral nutrition/hydration, use of swallowing techniques, management of texture/viscosity, other (see comments)  [regular and thins] at 07/02/2023 1001   Time Frame long-term goal (LTG), 4 weeks at  07/02/2023 1001   Progress/Outcome continuing progress toward goal at 07/11/2023 0813   Oral Motor Exercise Goal 1    Activity perform oral motor strengthening exercises, facial/cheek, lip, tongue, strength/ROM, mastication/bolus manipulation, 5-10 times per session, other (see comments)  [use mirror/visual model] at 07/02/2023 1001   Emmons with 1:1 supervision/constant cues at 07/02/2023 1001   Time Frame short-term goal (STG), 14 days or less at 07/02/2023 1001   Progress/Outcome goal met at 07/11/2023 0813   Oral Motor Exercise Goal 2    Activity perform oral motor strengthening exercises, facial/cheek, lip, tongue, strength/ROM, mastication/bolus manipulation, other (see comments)  [2-3 times a day sets of 10-15] at 07/02/2023 1001   Emmons with minimal cues (75-90% accuracy) at 07/02/2023 1001   Time Frame long-term goal (LTG), 4 weeks at 07/02/2023 1001   Progress/Outcome goal ongoing at 07/11/2023 0813          Discharge Planning:  Anticipated Discharge Disposition: home with home health, home with outpatient services  Type of Home Care Services: home OT;home SLP;home PT;nursing  Equipment/Device Needs at Discharge  Assistive Device/Animal Currently Used at Home:  (Per chart review pt has RW at home - unclear if pt was using AD and pt unable to state PLOF at this time)  Discharge Planning  Does the patient need discharge transport arranged?: No    Anticipated Discharge Date: 7/14/2023    Needs Identified:  Community Reintegration  Facilitators: integration into community life  Barriers: type of disability    Team Members Present:     Rehab Attending Present:  Liv Haywood MD    Care Coordinator Present:  Naila Verma LSW    Nurse Present:  Radha White RN    OT Present:  Daniela Corrales OT    PT Present:  Melba Hill PT    SLP Present:  Isabel Orantes CF-SLP    Psychologist Present:  Madyson Chanel PSY.D Next Team Conference Date: 07/18/23

## 2023-07-11 NOTE — PROGRESS NOTES
[facility] Internal Medicine Progress Note          Patient was seen and examined.     Ms Sams is a 78-year-old female with ischemic cardiomyopathy, heart block s/p PPM, CAD, AAA, hypertension, GERD who was admitted to [facility] 6/26 with altered mental status.  She was noted to be aphasic with right-sided weakness  Initial CT scan concerning for acute infarct in the posterior aspect of the left MCA territory with associated thrombosed vessel  Follow-up CT head revealed acute/subacute infarct in the left MCA with 3 mm midline shift, no definite hemorrhage  She was started on aspirin and heparin drip  Noted dysphagia, speech therapy recommends soft and bite-size diet with mildly thick liquids  Echocardiogram 6/27 reported LVEF 43%, localized apical aneurysm containing thrombus which most likely caused her acute CVA.  She was transitioned to Eliquis, her aspirin was discontinued  She continues on Crestor for dyslipidemia  She is now [facility] to [facility] for acute inpatient rehabilitation      Subjective      Patient without nursing issues overnight.  Able to make needs known generally with hand gestures.  Remains eager to return home.  Awaiting to mobilize friends for assistance.  Blood pressures are well controlled.  Labs trending stable.  Patient remains interactive with therapies and continues to progress.    Objective     Vital signs in last 24 hours:  Temp:  [36.4 °C (97.5 °F)-36.8 °C (98.3 °F)] 36.8 °C (98.3 °F)  Heart Rate:  [69-89] 82  Resp:  [16-17] 17  BP: (114-146)/(69-82) 146/72     Allergies   Allergen Reactions   • Ace Inhibitors Other (see comments)     cough   • Atorvastatin      Nausea and can'trecall   • Brilinta [Ticagrelor]      SOB   • Codeine      Nausea and Vomiting   • Dilaudid [Hydromorphone]      Nausea & vomiting   • Morphine Sulfate Nausea Only and GI intolerance   • Onion      Severe abdominal pain   • Oxycodone      Nausea & vomiting, dizziness      Medications:      • apixaban  5 mg oral BID   • cycloSPORINE  1 drop Both Eyes q12h ROBERTO   • lansoprazole  30 mg oral Daily before breakfast   • melatonin  3 mg oral Nightly   • rosuvastatin  20 mg oral Nightly   • trazodone  25 mg oral Nightly         Objective      Full Code    Physical Exam  HEENT: PERRLA, EOMI, sclera anicteric, mucous membranes moist  Neck: Supple, JVP less than 5 cm  Heart: Regular rhythm, no murmurs, rubs or gallops  Lungs: Clear to auscultation  Abdomen: Normal bowel sounds, soft, nontender, no rebound or guarding  Extremities: No clubbing, cyanosis or edema  Neuro: Patient alert, expressive aphasia, following simple commands with right-sided weakness    Lab Results   Component Value Date    GLUCOSE 90 07/10/2023    CALCIUM 8.9 07/10/2023     07/10/2023    K 4.2 07/10/2023    CO2 27 07/10/2023     07/10/2023    BUN 11 07/10/2023    CREATININE 0.8 07/10/2023       Lab Results   Component Value Date    WBC 5.12 07/10/2023    HGB 15.0 07/10/2023    HCT 45.0 07/10/2023    MCV 93.8 07/10/2023     07/10/2023       Chest x-ray 6/26:  Impression:  No active disease in the chest     CT head stroke alert 6/26:  IMPRESSION:   1. Findings highly concerning for an acute infarct in the posterior aspect of the left MCA territory with an associated thrombosed vessel.   2. Chronic microangiopathy and involutional changes.     CTA head/neck 6/26, CT brain perfusion:  IMPRESSION:   1. Atherosclerosis at the carotid bifurcations with mild luminal narrowing measuring approximately 25% on the right and 20% on the left using NASCET criteria. Patent cervical carotid and vertebral arteries.   2. Termination of flow in an M3 branch of the left middle cerebral artery.   3. Intracranial atherosclerosis with significant narrowing involving the left V4 segment after the takeoff of the posterior inferior cerebellar artery. Mild irregularity of the basilar artery.   4. Completed infarct in the  posterior aspect of the left MCA territory. No evidence of ischemic penumbra. rCBF <30% : 0 cc TMax > 6s: 2 cc Mismatch volume: 2 cc Mismatch Ratio: NA   5. Additional chronic and incidental findings, as above. In order to accelerate response time, other vascular pathology including occlusions of more distal arteries are not completely evaluated on this examination.     CT head 6/26:  IMPRESSION:   Evolving left MCA territory infarct.  Small hyperdense foci within the infarct may reflect contrast staining from interval angiography versus punctate hemorrhage.  Short interval follow-up recommended.     CT head 6/27:  IMPRESSION:   Acute/subacute left MCA distribution infarct with 3 mm midline shift.. No definite hemorrhage    Transthoracic echo 6/27:  Left ventricle with normal dimensions and regional contraction abnormalities consistent with CAD in the LAD distribution: Localized apical infarction with aneurysm formation.  Paradoxical septal movement is consistent with ventricular pacing.  There is moderate left ventricular systolic and mild diastolic dysfunction.  LVEF 43% Left ventricular apical thrombus Left atrial pressure 14 mmHg There is a visual suggestion of mild aortic stenosis. (Doppler assessment of the aortic valve is suboptimal) Mild mitral regurgitation Normal right ventricle dimensions and systolic function: TAPSE 1.84 cm Mild tricuspid regurgitation PASP 34 mmHg Normal left atrial volume index No pericardial effusion or vegetations AS-V pacing Technically difficult study: No parasternal imaging obtained This study was performed with Definity contrast to optimize evaluation of regional and global left ventricular function No significant change from 3/28/2023 except for current demonstration of left ventricular apical thrombus    Assessment     Neuro:  Left MCA CVA  -Initial CT with concern for possible bleed, repeat scan without hemorrhage but 3 mm midline shift  -Continue Eliquis, statin, blood  pressure management for secondary prevention  -Consult neurology     Cardiovascular:  Echo 6/27 showing LVEF 43%, localized apical aneurysm containing thrombus, possible mild AAS, mild MR, normal RV, PASP 34  -Has been started on Eliquis, aspirin discontinued  Dysrhythmia, history of heart block, s/p [employer] pacemaker  -Follows with Dr. Carolina as outpatient  CAD  -NSTEMI 4/26/19, 3vCAD, s/p FRANK x2 LAD, FRANK x4 RCA, FRANK x1LCx OM2  Ischemic cardiomyopathy  -Previously recommended Entresto, but was cost prohibitive; valsartan also recommended but not started due to concern for dizziness     Pulmonary:  -Increased risk of aspiration due to dysphagia  -Maintain aspiration precautions  -Encourage use of incentive spirometry for prevention of atelectasis     Endocrine:  Dyslipidemia  -Continue Crestor, low-fat diet     Renal:  BUN/Cr 12/0.7 with GFR> 60  -Continue to monitor renal function, volume status     GI:  Dysphagia  -Soft and bite sized, mildly thick diet level  -Consult speech therapy  -Maintain aspiration precautions  KELLI/GI prophylaxis  -continue PPI therapy  Constipation  -Bowel program available as needed     :  Increased risk of urinary retention, UTI due to CVA  -Monitor for symptom complaint     Heme:  Anemia, normochromic, normocytic with normal RDW  -Monitor trend hemoglobin on apixaban  Thrombocytopenia  -Continue to monitor trend platelet count     F/E/N:  Soft and bite-size, mildly thick, cardiac diet  -Monitor electrolytes, hydration, nutritional status  -Increased risk of electrolyte abnormalities, dehydration due to altered diet  -Consult nutrition     Prophylaxis:  -Apixaban DVT prophylaxis  -PPI GI prophylaxis  -Spirometry for atelectasis  -Maintain fall, cardiac, aspiration precautions        Darrel Ramey MD  7/11/2023

## 2023-07-11 NOTE — PLAN OF CARE
Problem: Rehabilitation ([facility]) Plan of Care  Goal: Plan of Care Review  Flowsheets (Taken 7/11/2023 1323)  Plan of Care Reviewed With: patient  Outcome Evaluation: Plan of care discontinued for music therapy services. Therapist will no longer see pt as they are discharging this week. Therapist believes they would be a good candidate for music therapy in the future.

## 2023-07-11 NOTE — PLAN OF CARE
Problem: Swallowing Impairment  Goal: Optimal Eating and Swallowing Without Aspiration  Outcome: Progressing     Problem: Communication Impairment  Goal: Effective Communication Skills  Outcome: Progressing

## 2023-07-11 NOTE — PROGRESS NOTES
Physical Medicine and Rehabilitation Progress Note          Patient was seen and examined.   Attestation Notes: Face to face encounter completed    Subjective     No acute events overnight.  Patient seen and examined this morning in room.  History limited by cog/communication impairment.      Close supervision for transfers, close supervision 500 ft, close supervision 16 stairs  Eats more when friends are here.    ABS scores within normal limits        Review of Systems:  Negative except as per HPI    Objective     Vital signs in last 24 hours:  Temp:  [36.4 °C (97.5 °F)-36.8 °C (98.3 °F)] 36.8 °C (98.3 °F)  Heart Rate:  [69-87] 80  Resp:  [16-17] 17  BP: (114-140)/(69-82) 131/69    Physical Exam      General Appearance:        Alert, cooperative, no distress   Head:    Normocephalic,atraumatic   Eyes:    Conjunctiva clear      Lungs:     Clear to auscultation bilaterally, respirations unlabored   Heart:    Regular rate and rhythm, no murmur   Abdomen:     Soft, non-tender, bowel sounds active   Extremities:     Extremities normal, atraumatic, no cyanosis or edema           Skin:   No rashes or lesions   Neurologic:          Behavior/  Emotional:   Awake, alert, aphasic, verbal output limited to incoherent sounds.  She is able to follow simple commands with increased time.  R wrist and hand weakness, wearing splint      Appropriate, cooperative                  Results from last 7 days   Lab Units 07/10/23  0514   WBC K/uL 5.12   RBC M/uL 4.80   HEMOGLOBIN g/dL 15.0   HEMATOCRIT % 45.0   PLATELETS K/uL 204   MCV fL 93.8   RDW % 13.2   EOS ABS AUTO K/uL 0.11       Results from last 7 days   Lab Units 07/10/23  0514 07/07/23  1205 07/06/23  1453   SODIUM mEQ/L 140 139 141   POTASSIUM mEQ/L 4.2 4.4 4.5   CHLORIDE mEQ/L 105 104 107   CO2 mEQ/L 27 26 28   BUN mg/dL 11 9 7   CREATININE mg/dL 0.8 0.7 0.8   EGFR mL/min/1.73m*2 >60.0 >60.0 >60.0   ANION GAP mEQ/L 8 9 6       Current Facility-Administered Medications    Medication Dose Route Frequency   • acetaminophen  650 mg oral q6h PRN   • apixaban  5 mg oral BID   • cycloSPORINE  1 drop Both Eyes q12h ROBERTO   • lansoprazole  30 mg oral Daily before breakfast   • melatonin  3 mg oral Nightly   • rosuvastatin  20 mg oral Nightly   • senna  1 tablet oral 2x daily PRN   • trazodone  25 mg oral Nightly       Plan of care was discussed with patient                    * Acute ischemic left MCA stroke (CMS/HCC)  Assessment & Plan  The patient is a 78-year-old  female with a history of AAA, heart block s/p cardiac pacemaker, glaucoma, hyperlipidemia, coronary artery disease, NSTEMI, who presented to [facility] on June 26 after she has not been seen by her friends for a few days.   In the emergency department she was aphasic with right-sided weakness and hypertensive to the 190 systolic.  CT scan of the head revealed an acute infarct in left MCA with mild bleeding.  Echo showed an EF of 43% and a bubble study was negative.  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  Later this was transitioned to apixaban.        -Full program PT/OT/SLP/rehabilitation RN/psychology. Consult internal medicine for comorbidities.  -Continue apixaban  -Continue statin  -Sleep - melatonin and trazodone  -Safety - one-to-one               Right wrist drop  Assessment & Plan  Splinting, contracture prevention    Left ventricular apical thrombus  Assessment & Plan  Continue apixaban    Coronary artery disease involving native heart without angina pectoris  Assessment & Plan  Continue apixaban    Glaucoma  Assessment & Plan  Continue eye drops    Follow-up exam  Assessment & Plan  Dispo: 7/14 home with assist, home with HC before transition to OP      PCP  Cardiology Dr. Lorenzo for her cardiac function and anticoagulation treatment  Neurology   PCP

## 2023-07-11 NOTE — PLAN OF CARE
Plan of Care Review  Plan of Care Reviewed With: patient  Progress: improving  Outcome Evaluation: Pt alert with expressive aphasia. Able to make basic needs known. Denies having pain. Pt remains continent. Ambulates to bathroom. 1:1 ATC for safety and impulsivity. Took meds this evening. Pt did become very agitated when asked to put socks on to ambulate to bathroom. Difficult to redirect. Pt did comply and now resting comfortably.   Yes

## 2023-07-11 NOTE — PROGRESS NOTES
Patient: Jennifer Sams  Location: [facility] [address]  MRN: [id number]  Today's date: 7/11/2023    History of Present Illness  Jennifer is a 78 y.o. female admitted on 7/1/2023 with Acute ischemic left MCA stroke (CMS/HCC) [I63.512]. Principal problem is Acute ischemic left MCA stroke (CMS/HCC).    The patient is a 78-year-old  female with a history of AAA, heart block s/p cardiac pacemaker, glaucoma, hyperlipidemia, coronary artery disease, NSTEMI, who presented to [facility] on June 26 after she has not been seen by her friends for a few days.  Patient lives alone and when she was found by EMS she was confused and combative.  In the emergency department she was aphasic with right-sided weakness and hypertensive to the 190 systolic.  CT scan of the head revealed an acute infarct in left MCA with mild bleeding.  Echo showed an EF of 43% and a bubble study was negative.  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  Later this was transitioned to apixaban.  She was continued on statin for secondary prevention.  She was evaluated by speech therapy and cleared for a soft and bite-size diet with moderately thick liquids.    7/2/2023:  Diet SB6 with mildly thick liquids (MT2)  7/6/2023: Upgraded to EC7 with thin liquids    Past Medical History  Jennifer has a past medical history of AAA (abdominal aortic aneurysm), Arthritis, Elevated blood pressure reading without diagnosis of hypertension (4/19/2019), Epistaxis (1/6/2020), GERD (gastroesophageal reflux disease) (4/19/2019), Glaucoma (4/19/2019), Heart murmur, Hiatal hernia, History of hip replacement, total, right (4/19/2019), History of rheumatic fever as a child (4/19/2019), Hypercholesterolemia (4/19/2019), Ischemic cardiomyopathy (5/9/2019), Kidney cysts, Osteoarthritis of multiple joints (4/19/2019), Osteoporosis, Pacemaker (12/9/2019),  Raynaud's disease (4/19/2019), RSD (reflex sympathetic dystrophy) (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        PT Vitals    Date/Time Pulse BP BP Location BP Method Pt Position [facility]   07/11/23 1145 82 146/72 Left upper arm Automatic Sitting LBR      PT Pain    Date/Time Pain Type Rating: Rest Rating: Activity [facility]   07/11/23 1109 Pain Assessment 0 - no pain 0 - no pain LBR   07/11/23 1145 Post Activity 0 - no pain 0 - no pain LBR          Prior Living Environment    Flowsheet Row Most Recent Value   People in Home alone   Current Living Arrangements home   Home Accessibility --  [2 SH with 1 DARÍO]   Living Environment Comment Per chart review, pt lives alone in a 2SH with 1STE   Number of Stairs, Main Entrance 1   Number of Stairs, Within Home, Primary 12  [Full fight to second floor]          Prior Level of Function    Flowsheet Row Most Recent Value   Dominant Hand --  [Unable to determine 2/2 cognitive/communication deficits]   Ambulation independent   Transferring independent   Toileting independent   Bathing independent   Dressing independent   Eating independent   IADLs independent   Communication understands/communicates without difficulty   Swallowing swallows foods/liquids without difficulty   Baseline Diet/Method of Nutritional Intake no diet restrictions   Past History of Dysphagia no   Prior Level of Function Comment Per chart review: PMH SB6,  MT2,  No VFSS in EMR.  Pt seen by SLP at [place].   Assistive Device Currently Used at Home --  [Per chart review pt has RW at home - unclear if pt was using AD and pt unable to state PLOF at this time]           IRF PT Evaluation and Treatment - 07/11/23 1111        PT Time Calculation    Start Time 1100     Stop Time 1200     Time Calculation (min) 60 min        Session Details    Document Type Daily Treatment/Progress Note     Mode of Treatment physical therapy;individual  therapy        Mobility Belt    Mobility Belt Used for All Out of Bed Activity yes        Bed Mobility    Pyrites, Roll Left modified independence     Pyrites, Roll Right modified independence     Pyrites, Supine to Sit modified independence     Pyrites, Sit to Supine modified independence        Sit to Stand Transfer    Pyrites, Sit to Stand Transfer close supervision     Safety/Cues impulsivity     Assistive Device none        Stand to Sit Transfer    [place]rites, Stand to Sit Transfer close supervision     Safety/Cues impulsivity     Assistive Device none        Stand Pivot Transfer    [place], Stand Pivot/Stand Step Transfer close supervision     Safety/Cues impulsivity     Assistive Device none     Comment amb approach        Gait Training    Pyrites, Gait close supervision     Safety/Cues impulsivity     Assistive Device none     Distance in Feet 300 feet     Pattern step-through     Deviations/Abnormal Patterns gait speed decreased     Bilateral Gait Deviations heel strike decreased     Pyrites, Picking Up Object close supervision     Comment (Gait/Stairs) 1 gait trial around the unit with cl S for safety.  Multiple additional short distance trials completed around the room only, with S for safety (pt refused to leave the room again due to indigestion pain and fatigue).        Gutierrez Balance Scale    Sitting to Standing Able to stand without using hands and stabilize independently     Standing Unsupported Able to stand safely for 2 minutes     Sitting with Back Unsupported but Feet Supported on Floor or on a Stool Able to sit safely and securely for 2 minutes     Standing to Sitting Sits safely with minimal use of hands     Transfers Able to transfer with verbal cueing and/or supervision     Standing Unsupported with Eyes Closed Able to stand 10 seconds safely     Standing Unsupported with Feet Together Needs help to attain position but able to stand 15 seconds feet  together     Reach Forward with Outstretched Arm While Standing Can reach forward 5 cm (2 inches)      Object from Floor from a Standing Position Able to  slipper but needs supervision     Turning to Look Behind Over Left and Right Shoulders While Standing Turns sideways only but maintains balance     Turn 360 Degrees Able to turn 360 degrees safely but slowly     Place Alternate Foot on Step or Stool While Standing Unsupported Able to complete 4 steps without aid with supervision     Standing Unsupported One Foot in Front Able to take small step independently and hold 30 seconds     Standing on One Leg Tries to lift leg unable to hold 3 seconds but remains standing independently     Mcgrath Balance Score 37        Lower Extremity (Therapeutic Exercise)    Exercise Position/Type standing     General Exercise heel raises     Reps and Sets x 10 reps     Comment cl S for balance.  performed without UE support        Therapy Assessment/Plan (PT)    Frequency of Treatment (PT) 5-7 times per week     Intensity of Treatment (PT) 60-90 minutes per day        Daily Progress Summary (PT)    Daily Outcome Statement MCGRATH reassessed with mild improvement in score.  Pt remains at increased risk of falls with MCGRATH now 37/56 (from 33/56 previously).  MCGRATH completed with max demonstration.  Pt c/o indigestion pain requiring max encouragement for participation.  Pt directly handed off to 1:1 at end of session.                           IRF PT Goals    Flowsheet Row Most Recent Value   Bed Mobility Goal 1    Activity/Assistive Device bed mobility activities, all at 07/02/2023 0830   [place] modified independence at 07/11/2023 0827   Time Frame 1 week, short-term goal (STG) at 07/11/2023 0827   Strategies/Barriers not reassessed at 07/05/2023 0835   Progress/Outcome goal revised this date at 07/11/2023 0827   Bed Mobility Goal 2    Activity/Assistive Device bed mobility activities, all at 07/02/2023 0830   [place]  modified independence at 07/02/2023 0830   Time Frame 1 week, short-term goal (STG) at 07/11/2023 0827   Strategies/Barriers STG=LTG at 07/11/2023 0827   Progress/Outcome goal ongoing at 07/11/2023 0827   Transfer Goal 1    Activity/Assistive Device sit-to-stand/stand-to-sit, bed-to-chair/chair-to-bed, stand pivot at 07/02/2023 0830   Cross supervision required at 07/02/2023 0830   Time Frame 3 - 5 days, short-term goal (STG) at 07/11/2023 0827   Strategies/Barriers steadying assist for safety at 07/05/2023 0835   Progress/Outcome goal ongoing at 07/11/2023 0827   Transfer Goal 2    Activity/Assistive Device sit-to-stand/stand-to-sit, bed-to-chair/chair-to-bed, stand pivot at 07/02/2023 0830   [place] modified independence at 07/02/2023 0830   Time Frame 1 week, long-term goal (LTG) at 07/11/2023 0827   Progress/Outcome goal ongoing at 07/11/2023 0827   Gait/Walking Locomotion Goal 1    Activity/Assistive Device gait (walking locomotion) at 07/02/2023 0830   Distance 250 feet at 07/02/2023 0830   Cross supervision required at 07/02/2023 0830   Time Frame 3 - 5 days, short-term goal (STG) at 07/11/2023 0827   Strategies/Barriers cl S at 07/11/2023 0827   Progress/Outcome goal ongoing at 07/11/2023 0827   Gait/Walking Locomotion Goal 2    Activity/Assistive Device gait (walking locomotion) at 07/02/2023 0830   Distance 500 feet at 07/02/2023 0830   Cross supervision required at 07/02/2023 0830   Time Frame 1 week, long-term goal (LTG) at 07/11/2023 0827   Progress/Outcome goal ongoing at 07/11/2023 0827   Wheelchair Locomotion Goal 1    Activity wheelchair mobility skills, all at 07/02/2023 0830   Assistive Device manual, lightweight at 07/02/2023 0830   Distance 50 feet at 07/02/2023 0830   Cross minimum assist (75% or more patient effort) at 07/02/2023 0830   Time Frame 1 week, short-term goal (STG) at 07/05/2023 0835   Strategies/Barriers pt refuses to sit in w/c at 07/11/2023 0809    Progress/Outcome goal no longer appropriate at 07/11/2023 0827   Wheelchair Locomotion Goal 2    Activity wheelchair mobility skills, all at 07/02/2023 0830   Assistive Device manual, lightweight at 07/02/2023 0830   Distance 200 feet at 07/05/2023 0835   [place] modified independence at 07/05/2023 0835   Time Frame 2 weeks, long-term goal (LTG) at 07/05/2023 0835   Progress/Outcome goal no longer appropriate at 07/11/2023 0827   Stairs Goal 1    Activity/Assistive Device stairs, all skills at 07/02/2023 0830   Number of Stairs 16 at 07/02/2023 0830   [place] supervision required at 07/02/2023 0830   Time Frame 3 - 5 days, short-term goal (STG) at 07/11/2023 0827   Strategies/Barriers cl S at 07/11/2023 0827   Progress/Outcome goal ongoing at 07/11/2023 0827   Stairs Goal 2    Activity/Assistive Device stairs, all skills at 07/02/2023 0830   Number of Stairs 24 at 07/02/2023 0830   [place] supervision required at 07/02/2023 0830   Time Frame 1 week, long-term goal (LTG) at 07/11/2023 0827   Progress/Outcome goal ongoing at 07/11/2023 0827

## 2023-07-11 NOTE — NURSING NOTE
Attempted to educate pt regarding the reason for gait belt use when ambulating to assist w/harm prevention and safety. Pt refused to put on; will continue to try and educate pt regarding the reason for gait belt use.

## 2023-07-11 NOTE — PLAN OF CARE
Plan of Care Review  Plan of Care Reviewed With: patient  Progress: no change  Outcome Evaluation: Pt is aphasic; pt is continent of bowel and bladder; last BM 7/7/23; MD's are aware; pt has poor appetite; pt is 1:1 for safety; pt is currenty in bed w/1:1 at bedside.

## 2023-07-11 NOTE — PROGRESS NOTES
Patient: Jennifer Sams  Location: [facility] [address]  MRN: [id number]  Today's date: 7/11/2023    History of Present Illness  Jennifer is a 78 y.o. female admitted on 7/1/2023 with Acute ischemic left MCA stroke (CMS/HCC) [I63.512]. Principal problem is Acute ischemic left MCA stroke (CMS/HCC).    The patient is a 78-year-old  female with a history of AAA, heart block s/p cardiac pacemaker, glaucoma, hyperlipidemia, coronary artery disease, NSTEMI, who presented to [facility] on June 26 after she has not been seen by her friends for a few days.  Patient lives alone and when she was found by EMS she was confused and combative.  In the emergency department she was aphasic with right-sided weakness and hypertensive to the 190 systolic.  CT scan of the head revealed an acute infarct in left MCA with mild bleeding.  Echo showed an EF of 43% and a bubble study was negative.  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  Later this was transitioned to apixaban.  She was continued on statin for secondary prevention.  She was evaluated by speech therapy and cleared for a soft and bite-size diet with moderately thick liquids.    7/2/2023:  Diet SB6 with mildly thick liquids (MT2)  7/6/2023: Upgraded to EC7 with thin liquids    Past Medical History  Jennifer has a past medical history of AAA (abdominal aortic aneurysm), Arthritis, Elevated blood pressure reading without diagnosis of hypertension (4/19/2019), Epistaxis (1/6/2020), GERD (gastroesophageal reflux disease) (4/19/2019), Glaucoma (4/19/2019), Heart murmur, Hiatal hernia, History of hip replacement, total, right (4/19/2019), History of rheumatic fever as a child (4/19/2019), Hypercholesterolemia (4/19/2019), Ischemic cardiomyopathy (5/9/2019), Kidney cysts, Osteoarthritis of multiple joints (4/19/2019), Osteoporosis, Pacemaker (12/9/2019),  Raynaud's disease (4/19/2019), RSD (reflex sympathetic dystrophy) (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        SLP Pain    Date/Time Pain Type Rating: Rest Rating: Activity [facility]   07/11/23 1202 Pain Assessment 0 - no pain 0 - no pain CMP   07/11/23 1227 Pain Reassessment 0 - no pain 0 - no pain CMP          Prior Living Environment    Flowsheet Row Most Recent Value   People in Home alone   Current Living Arrangements home   Home Accessibility --  [2 SH with 1 DARÍO]   Living Environment Comment Per chart review, pt lives alone in a 2SH with 1STE   Number of Stairs, Main Entrance 1   Number of Stairs, Within Home, Primary 12  [Full fight to second floor]          Prior Level of Function    Flowsheet Row Most Recent Value   Dominant Hand --  [Unable to determine 2/2 cognitive/communication deficits]   Ambulation independent   Transferring independent   Toileting independent   Bathing independent   Dressing independent   Eating independent   IADLs independent   Communication understands/communicates without difficulty   Swallowing swallows foods/liquids without difficulty   Baseline Diet/Method of Nutritional Intake no diet restrictions   Past History of Dysphagia no   Prior Level of Function Comment Per chart review: PMH SB6,  MT2,  No VFSS in EMR.  Pt seen by SLP at [place].   Assistive Device Currently Used at Home --  [Per chart review pt has RW at home - unclear if pt was using AD and pt unable to state PLOF at this time]           IRF SLP Evaluation and Treatment - 07/11/23 1202        SLP Time Calculation    Start Time 1200     Stop Time 1230     Time Calculation (min) 30 min        Session Details    Document Type Daily Treatment/Progress Note     Mode of Treatment speech language pathology;individual therapy        General Information    Patient/Family/Caregiver Comments/Observations Pt's friend Annia morocho     General  Observations of Patient Pt received in bed, direct handoff from 1:1 at the beginning and end of session.        Cognition/Psychosocial    Comment, Cognition Pt presenting with more depressed mood in comparison to last week. Decreased motivation to engage with SLP and pt's friend during session. Pt expressing frustration with situation/environment via gestures. Active listening provided throughout.        Auditory Comprehension    Comment, Intervention (Auditory Comprehension) Given social and y/n questions, pt often benefits from repetition of question d/t decreased attention. Pt engaged in conversation with SLP and her [place]. Pt more animated during conversation, demonstrated comprehension of different restaurants that pt's friend mentioned.        Verbal Expression    Comment, Intervention (Verbal Expression) Pt utilized gestures to communicate dislike of items provided on lunch tray and hesitation to consume preferred items brought in by pt's friend.        Food and Liquid Trials (NIS)    Patient Positioning other (see comments)   sitting at edge of bed    Oral Intake/Feeding Performance set-up of food/liquid needed     Liquid Consistencies Evaluated thin liquids     Thin Liquids patient-controlled amounts;sips from cup     Comment, Thin Liquids water, milkshake via spoon     Food Consistencies Evaluated regular     Regular use of teaspoon/fork     Comment, Regular single small bite of chicken nugget     Oral Preparatory Phase of Swallow mastication, slow but effective;oral retention, right lateral sulci     Oral Phase of Swallow oral residue, incomplete swallow     Comment, Oral Phase Improved oral control of liquids from cup edge; no anterior bolus loss observed with sips of water. Moderately prolonged mastication of very small bite of chicken, pt I'ly used R-sided finger sweep to clear residue. Benefited from liquid wash.     Pharyngeal Phase of Swallow no clinical symptoms     Esophageal  Phase of Swallow no clinical symptoms     Comment Despite max encouragement from SLP, pt's friend, and 1:1, pt with minimal intake of preferred food items brought in by pt's friend (e.g., [employer] a chicken, fries, milkshake). Required use of liquid wash and R sided finger sweep following very small bites of chicken. No overt s/sx of aspiration observed.        Daily Progress Summary (SLP)    Daily Outcome Statement Fair participation. Session focus on diet tolerance/advancement. Despite max encouragement pt with minimal intake of preferred food items brought in by pt's friend. Required use of liquid wash and R sided finger sweep following very small bites of chicken. No overt s/sx of aspiration observed. Utilized gestures and changes in pitch to communicate with SLP and friend. ST to continue targeting communication and swallowing goals per POC.                      IRF SLP Goals    Flowsheet Row Most Recent Value   Auditory Comprehension Goal 1    Auditory Comprehension Goal 1 pt will complete basic Aud comprehension tasks (simple yes/no 75%),  1 step commands 50% with max cues,  match word heard to picture f/o 2 50% max cues at 07/02/2023 1001   Time Frame short-term goal (STG), 1 week at 07/02/2023 1001   Progress/Outcome goal met at 07/11/2023 0813   Auditory Comprehension Goal 2    Auditory Comprehension Goal 2 Pt will understand mod-complex level y/n questions 90% min cues,  2 step commands 75% min-mod cues,  at 07/02/2023 1001   Time Frame long-term goal (LTG), 4 weeks at 07/02/2023 1001   Progress/Outcome goal ongoing at 07/11/2023 0813   Verbal Expression Goal 1    Verbal Expression Goal 1 Pt will complete basic VE tasks (name. automatics, simple opposites, naming) with 20% accuracy and max cues. at 07/02/2023 1001   Time Frame short-term goal (STG), 1 week at 07/02/2023 1001   Progress/Outcome progress slower than expected at 07/11/2023 0813   Verbal Expression Goal 2    Verbal Expression Goal 2 Pt will  complete Verbal expression tasks (basic-mod) with 50-75% accuracy and min-mod cues.  Pt will imitate vowels 50% max cues,  at 07/02/2023 1001   Time Frame long-term goal (LTG), 4 weeks at 07/02/2023 1001   Progress/Outcome goal ongoing at 07/11/2023 0813   Motor Speech/Voice Goal 1    Motor Speech/Voice Goal 1 Repetition of vowels, CV/VC and CVC targets with max cues and models for 60% acc. at 07/04/2023 1510   Time Frame short-term goal (STG), 2 weeks at 07/04/2023 1510   Progress/Outcome progress slower than expected at 07/11/2023 0813   Motor Speech/Voice Goal 2    Motor Speech/Voice Goal 2 Repetition of vowels, CV/VC and CVC targets with min cues and models for 90% acc. at 07/04/2023 1510   Time Frame long-term goal (LTG), 4 weeks at 07/04/2023 1510   Progress/Outcome goal ongoing at 07/11/2023 0813   Reading Comprehension Goal 1    Reading Comprehension Goal 1 Pt will match word to picture field of 2 with 50% accuracy and mod-max cues,  Pt will read single functional words with 20% accuracy max cues at 07/02/2023 1001   Time Frame short-term goal (STG), 1 week at 07/02/2023 1001   Progress/Outcome goal no longer appropriate at 07/04/2023 1510   Reading Comprehension Goal 2    Reading Comprehension Goal 2 Pt will read simple sentences and comprehend simple sentences with 75% accuracy min-mod cues at 07/02/2023 1001   Time Frame long-term goal (LTG) at 07/02/2023 1001   Progress/Outcome goal no longer appropriate at 07/04/2023 1510   Written Expression Goal 1    Written Expression Goal 1 Pt will copy first name, shapes, letters and 3-4 letter words with 80% min cues,  pt will copy letters, name shapes with 50% accuracy and mod cues. at 07/02/2023 1001   Time Frame short-term goal (STG), 1 week at 07/02/2023 1001   Progress/Outcome goal no longer appropriate at 07/04/2023 1510   Written Expression Goal 2    Written Expression Goal 2 Pt will write first and last name,  single words 90% accuracy at 07/02/2023 1001    Progress/Outcome goal no longer appropriate at 07/04/2023 1510   Oral Nutrition/Hydration Goal 1    Activity effective/safe, use of swallowing techniques, management of texture/viscosity, other (see comments)  [SB6,  MT2.  Trials of thins and various textures by speech with min s/s of aspiration] at 07/02/2023 1001   Time Frame short-term goal (STG), 14 days or less at 07/02/2023 1001   Progress/Outcome goal met at 07/11/2023 0813   Oral Nutrition/Hydration Goal 2    Activity effective/safe, oral nutrition/hydration, use of swallowing techniques, management of texture/viscosity, other (see comments)  [regular and thins] at 07/02/2023 1001   Time Frame long-term goal (LTG), 4 weeks at 07/02/2023 1001   Progress/Outcome continuing progress toward goal at 07/11/2023 0813   Oral Motor Exercise Goal 1    Activity perform oral motor strengthening exercises, facial/cheek, lip, tongue, strength/ROM, mastication/bolus manipulation, 5-10 times per session, other (see comments)  [use mirror/visual model] at 07/02/2023 1001   [place] with 1:1 supervision/constant cues at 07/02/2023 1001   Time Frame short-term goal (STG), 14 days or less at 07/02/2023 1001   Progress/Outcome goal met at 07/11/2023 0813   Oral Motor Exercise Goal 2    Activity perform oral motor strengthening exercises, facial/cheek, lip, tongue, strength/ROM, mastication/bolus manipulation, other (see comments)  [2-3 times a day sets of 10-15] at 07/02/2023 1001   [place] with minimal cues (75-90% accuracy) at 07/02/2023 1001   Time Frame long-term goal (LTG), 4 weeks at 07/02/2023 1001   Progress/Outcome goal ongoing at 07/11/2023 0813

## 2023-07-11 NOTE — PROGRESS NOTES
Patient: Jennifer Sams  Location: [facility] [address]  MRN: [id number]  Today's date: 7/11/2023    History of Present Illness  Jennifer is a 78 y.o. female admitted on 7/1/2023 with Acute ischemic left MCA stroke (CMS/HCC) [I63.512]. Principal problem is Acute ischemic left MCA stroke (CMS/HCC).    The patient is a 78-year-old  female with a history of AAA, heart block s/p cardiac pacemaker, glaucoma, hyperlipidemia, coronary artery disease, NSTEMI, who presented to [facility] on June 26 after she has not been seen by her friends for a few days.  Patient lives alone and when she was found by EMS she was confused and combative.  In the emergency department she was aphasic with right-sided weakness and hypertensive to the 190 systolic.  CT scan of the head revealed an acute infarct in left MCA with mild bleeding.  Echo showed an EF of 43% and a bubble study was negative.  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  Later this was transitioned to apixaban.  She was continued on statin for secondary prevention.  She was evaluated by speech therapy and cleared for a soft and bite-size diet with moderately thick liquids.    7/2/2023:  Diet SB6 with mildly thick liquids (MT2)  7/6/2023: Upgraded to EC7 with thin liquids    Past Medical History  Jennifer has a past medical history of AAA (abdominal aortic aneurysm), Arthritis, Elevated blood pressure reading without diagnosis of hypertension (4/19/2019), Epistaxis (1/6/2020), GERD (gastroesophageal reflux disease) (4/19/2019), Glaucoma (4/19/2019), Heart murmur, Hiatal hernia, History of hip replacement, total, right (4/19/2019), History of rheumatic fever as a child (4/19/2019), Hypercholesterolemia (4/19/2019), Ischemic cardiomyopathy (5/9/2019), Kidney cysts, Osteoarthritis of multiple joints (4/19/2019), Osteoporosis, Pacemaker (12/9/2019),  Raynaud's disease (4/19/2019), RSD (reflex sympathetic dystrophy) (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        OT Vitals    Date/Time Pulse HR Source BP BP Location BP Method Pt Position [facility]   07/11/23 1011 89 Monitor 140/75 Left upper arm Automatic Sitting [place]   07/11/23 1058 81 Monitor 140/71 Left upper arm Automatic Sitting AK      OT Pain    Date/Time Pain Type Rating: Rest Rating: Activity [facility]   07/11/23 1011 Pain Assessment 0 - no pain 0 - no pain AK   07/11/23 1058 Pain Reassessment 0 - no pain 0 - no pain AK          Prior Living Environment    Flowsheet Row Most Recent Value   People in Home alone   Current Living Arrangements home   Home Accessibility --  [2 SH with 1 DARÍO]   Living Environment Comment Per chart review, pt lives alone in a 2SH with 1STE   Number of Stairs, Main Entrance 1   Number of Stairs, Within Home, Primary 12  [Full fight to second floor]          Prior Level of Function    Flowsheet Row Most Recent Value   Dominant Hand --  [Unable to determine 2/2 cognitive/communication deficits]   Ambulation independent   Transferring independent   Toileting independent   Bathing independent   Dressing independent   Eating independent   IADLs independent   Communication understands/communicates without difficulty   Swallowing swallows foods/liquids without difficulty   Baseline Diet/Method of Nutritional Intake no diet restrictions   Past History of Dysphagia no   Prior Level of Function Comment Per chart review: PMH SB6,  MT2,  No VFSS in EMR.  Pt seen by SLP at [place].   Assistive Device Currently Used at Home --  [Per chart review pt has RW at home - unclear if pt was using AD and pt unable to state PLOF at this time]          Occupational Profile    Flowsheet Row Most Recent Value   Occupational History/Life Experiences Will need to clarify PLOF/driving status. Pt unable to state 2/2  cognitive/communication deficits.   Environmental Supports and Barriers Per chart review, pt lives alone but has supportive friends.   Patient Goals Pt unable to state goal 2/2 communication deficits.           IRF OT Evaluation and Treatment - 07/11/23 1010        OT Time Calculation    Start Time 1000     Stop Time 1100     Time Calculation (min) 60 min        Session Details    Document Type Daily Treatment/Progress Note     Mode of Treatment occupational therapy;individual therapy        General Information    General Observations of Patient Pt received sitting EOB, direct handoff pre/post session to 1:1.        Mobility Belt    Mobility Belt Used for All Out of Bed Activity no     Reason Mobility Belt Not Used patient refused        Wrist/Hand Orthosis Management    Fabrication Comment (Wrist/Hand Orthosis) R pre-velia wrist cock up splint donned t/o, doffed for ROM/NMRE and re-donned at end of session.        Cognition/Psychosocial    Comment, Cognition Pt participates in multimatrix cube activity, tasked with matching ~25 shapes to their matching locations on the visual card display. Pt able to complete c increased time and Min VCs for attention to detail. Noted that pt matched all shapes on L side of board prior to matching shapes on R side, however no VCs required to look towards R side.        Bed Mobility    Comment OT: Sit EOB to supine c S.        Sit to Stand Transfer    [place], Sit to Stand Transfer close supervision     Safety/Cues impulsivity     Assistive Device none     Comment From EOM and EOB.        Stand to Sit Transfer    [place], Stand to Sit Transfer close supervision     Safety/Cues impulsivity     Assistive Device none     Comment To EOM and EOB.        Stand Pivot Transfer    [place], Stand Pivot/Stand Step Transfer close supervision     Safety/Cues impulsivity     Assistive Device none     Comment Via amb approach c Cl S 2/2 mild instability and decreased safety awareness.         Toilet Transfer    Transfer Technique stand pivot     [place] close supervision     Safety/Cues impulsivity     Assistive Device grab bars/safety frame     Comment Via amb approach c Cl S 2/2 mild instability and decreased safety awareness.        Impairments/Safety Issues    Comment, Safety Issues/Impairments OT: Medium HHD completed within unit hallways/therapy gym c Cl S s AD.        Balance    Comment, Balance Sitting EOM during L UE dynamic reaching and cognitive task on anterior cut out table c Cl S/S.        Motor Skills    Motor Control/Coordination Interventions neuro-muscular re-education        Neuromuscular Re-education    Interventions weight bearing;vibration     Positioning sitting     Equipment Used vibration wand     Comment WEIGHT BEARING: R UE palmar WB EOM during L UE superior/lateral reach to place/remove x40 graded clothespins on vertical dowel. Pt standing from EOM to limit reaching, requiring multimodal cues to remain sitting 2/2 communication deficits. Additional R UE palmar WB completed on anterior cut out table during cognitive task. VIBRATION: Vibration wand applied at wrist extensors/flexors during R UE PROM. No active movement noted at wrist or digits.        Upper Extremity (Therapeutic Exercise)    Exercise Position/Type seated;PROM (passive range of motion)     General Exercise right     Range of Motion Exercises wrist flexion/extension     Reps and Sets 2x10     Comment Gentle ROM completed while seated EOM.        Hand (Therapeutic Exercise)    Exercise Position/Type seated;PROM (passive range of motion)     General Exercise right;finger flexion/extension;thumb opposition     Reps and Sets 2x10        Toileting    Tasks adjust/manage clothing;perform bladder hygiene     [place] close supervision     Safety/Cues impulsivity     Position unsupported sitting     Adaptive Equipment grab bar/safety frame     Comment Continent of bladder while seated on toilet. Cl S  while in stance to manage clothing.        Daily Progress Summary (OT)    Daily Outcome Statement Pt tolerated session well c focus on R UE PROM/NMRE, non-linguistic cognition, R attention and functional mobility/transfers. At end of session, pt gesturing to OT that she had indigestion. OT notifed nursing and obtained fresh water for pt. Continue OT POC.                           IRF OT Goals    Flowsheet Row Most Recent Value   Transfer Goal 1    Activity/Assistive Device toilet at 07/02/2023 0711   [place] --  [Cl S] at 07/02/2023 0711   Time Frame short-term goal (STG), 5 - 7 days at 07/05/2023 0702   Progress/Outcome goal ongoing, goal revised this date at 07/05/2023 0702   Transfer Goal 2    Activity/Assistive Device toilet at 07/02/2023 0711   [place] supervision required at 07/02/2023 0711   Time Frame long-term goal (LTG), 14 days or less at 07/02/2023 0711   Progress/Outcome goal ongoing at 07/05/2023 0702   Transfer Goal 3    Activity/Assistive Device shower at 07/02/2023 0711   [place] --  [Cl S] at 07/02/2023 0711   Time Frame short-term goal (STG), 5 - 7 days at 07/02/2023 0711   Progress/Outcome goal ongoing, goal revised this date at 07/05/2023 0702   Transfer Goal 4    Activity/Assistive Device shower at 07/02/2023 0711   [place] supervision required at 07/02/2023 0711   Time Frame long-term goal (LTG), 14 days or less at 07/02/2023 0711   Progress/Outcome goal ongoing at 07/05/2023 0702   Bathing Goal 1    [place] --  [Steadying A] at 07/02/2023 0711   Time Frame short-term goal (STG), 5 - 7 days at 07/05/2023 0702   Progress/Outcome goal ongoing, goal revised this date at 07/05/2023 0702   Bathing Goal 2    [place] supervision required at 07/02/2023 0711   Time Frame long-term goal (LTG), 14 days or less at 07/02/2023 0711   Progress/Outcome goal ongoing at 07/05/2023 0702   UB Dressing Goal 1    [place] --  [Cl S] at 07/05/2023 0702   Time Frame short-term goal  (STG), 5 - 7 days at 07/05/2023 0702   UB Dressing Goal 2    [place] set-up required at 07/02/2023 0711   Time Frame long-term goal (LTG), 14 days or less at 07/02/2023 0711   Progress/Outcome goal ongoing at 07/05/2023 0702   LB Dressing Goal 1    [place] --  [Cl S] at 07/05/2023 0702   Time Frame short-term goal (STG), 5 - 7 days at 07/05/2023 0702   LB Dressing Goal 2    [place] supervision required at 07/02/2023 0711   Time Frame long-term goal (LTG), 14 days or less at 07/02/2023 0711   Progress/Outcome goal ongoing at 07/05/2023 0702   Grooming Goal 1    [place] --  [Steadying A] at 07/05/2023 0702   Time Frame short-term goal (STG), 5 - 7 days at 07/05/2023 0702   Progress/Outcome goal revised this date at 07/05/2023 0702   Grooming Goal 2    [place] set-up required at 07/02/2023 0711   Time Frame long-term goal (LTG), 14 days or less at 07/02/2023 0711   Progress/Outcome goal ongoing at 07/05/2023 0702   Toileting Goal 1    [place] --  [Cl S] at 07/05/2023 0702   Time Frame short-term goal (STG), 5 - 7 days at 07/05/2023 0702   Progress/Outcome goal met, goal revised this date at 07/05/2023 0702   Toileting Goal 2    [place] supervision required at 07/02/2023 0711   Time Frame long-term goal (LTG), 14 days or less at 07/02/2023 0711   Progress/Outcome goal ongoing at 07/05/2023 0702

## 2023-07-12 ENCOUNTER — APPOINTMENT (INPATIENT)
Dept: OCCUPATIONAL THERAPY | Facility: REHABILITATION | Age: 78
DRG: 057 | End: 2023-07-12
Payer: MEDICARE

## 2023-07-12 ENCOUNTER — APPOINTMENT (INPATIENT)
Dept: SPEECH THERAPY | Facility: REHABILITATION | Age: 78
DRG: 057 | End: 2023-07-12
Payer: MEDICARE

## 2023-07-12 ENCOUNTER — APPOINTMENT (INPATIENT)
Dept: OTHER | Facility: REHABILITATION | Age: 78
DRG: 057 | End: 2023-07-12
Payer: MEDICARE

## 2023-07-12 ENCOUNTER — APPOINTMENT (INPATIENT)
Dept: PHYSICAL THERAPY | Facility: REHABILITATION | Age: 78
DRG: 057 | End: 2023-07-12
Payer: MEDICARE

## 2023-07-12 PROCEDURE — 97116 GAIT TRAINING THERAPY: CPT | Mod: GP

## 2023-07-12 PROCEDURE — 63700000 HC SELF-ADMINISTRABLE DRUG: Performed by: HOSPITALIST

## 2023-07-12 PROCEDURE — 97530 THERAPEUTIC ACTIVITIES: CPT | Mod: 59

## 2023-07-12 PROCEDURE — 97110 THERAPEUTIC EXERCISES: CPT | Mod: GO,59

## 2023-07-12 PROCEDURE — 12800000 HC ROOM AND CARE SEMIPRIVATE REHAB

## 2023-07-12 PROCEDURE — 97112 NEUROMUSCULAR REEDUCATION: CPT | Mod: GO,59

## 2023-07-12 PROCEDURE — 97530 THERAPEUTIC ACTIVITIES: CPT | Mod: GO,59

## 2023-07-12 PROCEDURE — 63700000 HC SELF-ADMINISTRABLE DRUG: Performed by: STUDENT IN AN ORGANIZED HEALTH CARE EDUCATION/TRAINING PROGRAM

## 2023-07-12 PROCEDURE — 97530 THERAPEUTIC ACTIVITIES: CPT | Mod: GP,59

## 2023-07-12 PROCEDURE — 92507 TX SP LANG VOICE COMM INDIV: CPT | Mod: GN

## 2023-07-12 RX ADMIN — APIXABAN 5 MG: 5 TABLET, FILM COATED ORAL at 07:59

## 2023-07-12 RX ADMIN — TRAZODONE HYDROCHLORIDE 25 MG: 50 TABLET, FILM COATED ORAL at 20:19

## 2023-07-12 RX ADMIN — ROSUVASTATIN CALCIUM 20 MG: 20 TABLET, FILM COATED ORAL at 20:21

## 2023-07-12 RX ADMIN — CYCLOSPORINE 1 DROP: 0.5 EMULSION OPHTHALMIC at 08:06

## 2023-07-12 RX ADMIN — APIXABAN 5 MG: 5 TABLET, FILM COATED ORAL at 20:19

## 2023-07-12 RX ADMIN — LANSOPRAZOLE 30 MG: KIT at 07:59

## 2023-07-12 RX ADMIN — CYCLOSPORINE 1 DROP: 0.5 EMULSION OPHTHALMIC at 20:18

## 2023-07-12 RX ADMIN — Medication 3 MG: at 20:19

## 2023-07-12 NOTE — PROGRESS NOTES
Patient: Jennifer Sams  Location: [facility] [address]  MRN: [id number]  Today's date: 7/12/2023    History of Present Illness  Jennifer is a 78 y.o. female admitted on 7/1/2023 with Acute ischemic left MCA stroke (CMS/HCC) [I63.512]. Principal problem is Acute ischemic left MCA stroke (CMS/HCC).    The patient is a 78-year-old  female with a history of AAA, heart block s/p cardiac pacemaker, glaucoma, hyperlipidemia, coronary artery disease, NSTEMI, who presented to [facility] on June 26 after she has not been seen by her friends for a few days.  Patient lives alone and when she was found by EMS she was confused and combative.  In the emergency department she was aphasic with right-sided weakness and hypertensive to the 190 systolic.  CT scan of the head revealed an acute infarct in left MCA with mild bleeding.  Echo showed an EF of 43% and a bubble study was negative.  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  Later this was transitioned to apixaban.  She was continued on statin for secondary prevention.  She was evaluated by speech therapy and cleared for a soft and bite-size diet with moderately thick liquids.    7/2/2023:  Diet SB6 with mildly thick liquids (MT2)  7/6/2023: Upgraded to EC7 with thin liquids    Past Medical History  Jennifer has a past medical history of AAA (abdominal aortic aneurysm), Arthritis, Elevated blood pressure reading without diagnosis of hypertension (4/19/2019), Epistaxis (1/6/2020), GERD (gastroesophageal reflux disease) (4/19/2019), Glaucoma (4/19/2019), Heart murmur, Hiatal hernia, History of hip replacement, total, right (4/19/2019), History of rheumatic fever as a child (4/19/2019), Hypercholesterolemia (4/19/2019), Ischemic cardiomyopathy (5/9/2019), Kidney cysts, Osteoarthritis of multiple joints (4/19/2019), Osteoporosis, Pacemaker (12/9/2019),  Raynaud's disease (4/19/2019), RSD (reflex sympathetic dystrophy) (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        OT Pain    Date/Time Pain Type Rating: Rest Rating: Activity [facility]   07/12/23 1403 Pain Assessment 0 - no pain 0 - no pain AK   07/12/23 1437 Pain Reassessment 0 - no pain 0 - no pain AK          Prior Living Environment    Flowsheet Row Most Recent Value   People in Home alone   Current Living Arrangements home   Home Accessibility --  [2 SH with 1 DARÍO]   Living Environment Comment Per chart review, pt lives alone in a 2SH with 1STE   Number of Stairs, Main Entrance 1   Number of Stairs, Within Home, Primary 12  [Full fight to second floor]          Prior Level of Function    Flowsheet Row Most Recent Value   Dominant Hand --  [Unable to determine 2/2 cognitive/communication deficits]   Ambulation independent   Transferring independent   Toileting independent   Bathing independent   Dressing independent   Eating independent   IADLs independent   Communication understands/communicates without difficulty   Swallowing swallows foods/liquids without difficulty   Baseline Diet/Method of Nutritional Intake no diet restrictions   Past History of Dysphagia no   Prior Level of Function Comment Per chart review: PMH SB6,  MT2,  No VFSS in EMR.  Pt seen by SLP at [place].   Assistive Device Currently Used at Home --  [Per chart review pt has RW at home - unclear if pt was using AD and pt unable to state PLOF at this time]          Occupational Profile    Flowsheet Row Most Recent Value   Occupational History/Life Experiences Will need to clarify PLOF/driving status. Pt unable to state 2/2 cognitive/communication deficits.   Environmental Supports and Barriers Per chart review, pt lives alone but has supportive friends.   Patient Goals Pt unable to state goal 2/2 communication deficits.           IRF OT Evaluation and Treatment -  07/12/23 1405        OT Time Calculation    Start Time 1400     Stop Time 1440     Time Calculation (min) 40 min        Session Details    Document Type Daily Treatment/Progress Note     Mode of Treatment occupational therapy;individual therapy        General Information    General Observations of Patient Pt received sitting up in bed. Direct handoff pre/post session to 1:1.        Mobility Belt    Mobility Belt Used for All Out of Bed Activity yes        Wrist/Hand Orthosis Management    Fabrication Comment (Wrist/Hand Orthosis) OT adding straps to modified R WHO and utilizing heat gun to smoothen out bump on palmar surface this session.        Sit to Stand Transfer    [place], Sit to Stand Transfer supervision     Safety/Cues impulsivity     Assistive Device none     Comment From chair without arms and bed.        Stand to Sit Transfer    [place], Stand to Sit Transfer supervision     Safety/Cues impulsivity     Assistive Device none     Comment To chair without arms.        Stand Pivot Transfer    [place], Stand Pivot/Stand Step Transfer supervision     Safety/Cues impulsivity     Assistive Device none     Comment Via amb approach c S for mild instability and decreased safety awareness.        Impairments/Safety Issues    Comment, Safety Issues/Impairments OT: Long distance HHM completed within hospital c Cl S s AD. Per pt preference, pt linking arms with OT, however OT providing no assistance for balance during ambulation.        Daily Progress Summary (OT)    Daily Outcome Statement Pt tolerated session fairly well. Pt continues to demonstrate increased frustration and slight agitation c R WHO. OT spent increased time this session providing education/encouragement, however pt still expressing frustration c R WHO. OT completing fabrication of R WHO c plan to defer wearing schedule and future modifications to HH or OP OT 2/2 upcoming discharge. Continue OT POC.                           IRF OT  Goals    Flowsheet Row Most Recent Value   Transfer Goal 1    Activity/Assistive Device toilet at 07/02/2023 0711   [place] supervision required at 07/11/2023 0730   Time Frame short-term goal (STG), 5 - 7 days at 07/11/2023 0730   Progress/Outcome goal met, goal revised this date at 07/11/2023 0730   Transfer Goal 2    Activity/Assistive Device toilet at 07/02/2023 0711   [place] supervision required at 07/02/2023 0711   Time Frame long-term goal (LTG), 14 days or less at 07/02/2023 0711   Progress/Outcome goal ongoing at 07/11/2023 0730   Transfer Goal 3    Activity/Assistive Device shower at 07/02/2023 0711   [place] --  [Cl S] at 07/02/2023 0711   Time Frame short-term goal (STG), 5 - 7 days at 07/11/2023 0730   Progress/Outcome goal ongoing at 07/11/2023 0730   Transfer Goal 4    Activity/Assistive Device shower at 07/02/2023 0711   [place] supervision required at 07/02/2023 0711   Time Frame long-term goal (LTG), 14 days or less at 07/02/2023 0711   Progress/Outcome goal ongoing at 07/11/2023 0730   Bathing Goal 1    [place] --  [Steadying A] at 07/02/2023 0711   Time Frame short-term goal (STG), 5 - 7 days at 07/11/2023 0730   Progress/Outcome goal ongoing at 07/11/2023 0730   Bathing Goal 2    [place] supervision required at 07/02/2023 0711   Time Frame long-term goal (LTG), 14 days or less at 07/02/2023 0711   Progress/Outcome goal ongoing at 07/11/2023 0730   UB Dressing Goal 1    [place] --  [Cl S] at 07/05/2023 0702   Time Frame short-term goal (STG), 5 - 7 days at 07/11/2023 0730   Progress/Outcome goal ongoing at 07/11/2023 0730   UB Dressing Goal 2    [place] set-up required at 07/02/2023 0711   Time Frame long-term goal (LTG), 14 days or less at 07/02/2023 0711   Progress/Outcome goal ongoing at 07/11/2023 0730   LB Dressing Goal 1    [place] --  [Cl S] at 07/05/2023 0702   Time Frame short-term goal (STG), 5 - 7 days at 07/11/2023 0730   Progress/Outcome  goal ongoing at 07/11/2023 0730   LB Dressing Goal 2    [place] supervision required at 07/02/2023 0711   Time Frame long-term goal (LTG), 14 days or less at 07/02/2023 0711   Progress/Outcome goal ongoing at 07/11/2023 0730   Grooming Goal 1    [place] supervision required at 07/11/2023 0730   Time Frame short-term goal (STG), 5 - 7 days at 07/11/2023 0730   Progress/Outcome goal met, goal revised this date at 07/11/2023 0730   Grooming Goal 2    [place] set-up required at 07/02/2023 0711   Time Frame long-term goal (LTG), 14 days or less at 07/02/2023 0711   Progress/Outcome goal ongoing at 07/11/2023 0730   Toileting Goal 1    [place] supervision required at 07/11/2023 0730   Time Frame short-term goal (STG), 5 - 7 days at 07/11/2023 0730   Progress/Outcome goal met, goal revised this date at 07/11/2023 0730   Toileting Goal 2    [place] supervision required at 07/02/2023 0711   Time Frame long-term goal (LTG), 14 days or less at 07/02/2023 0711   Progress/Outcome goal ongoing at 07/11/2023 0730

## 2023-07-12 NOTE — PLAN OF CARE
Plan of Care Review  Plan of Care Reviewed With: patient  Progress: improving  Outcome Evaluation: Assumed care of pt at 2300. Pt sleeping. 1:1 atc for impulsivity and safety. Pt remains continent and able to ambulate to the bathroom.

## 2023-07-12 NOTE — PROGRESS NOTES
07/12/23 1100   Discharge Needs Assessment   Patient/Family Anticipates Transition to home   Patient/Family Anticipated Services at Transition home health care;outpatient care   Transportation Anticipated family or friend will provide   Anticipated Changes Related to Illness inability to care for self   Anticipated Discharge Disposition home with home health;home with outpatient services   Offered/Gave Vendor List yes   Patient's Choice of Community Agency(s) [facility]   Current Discharge Risk cognitively impaired   Discharge Planning   Type of Home Care Services home OT;home PT;home SLP;nursing   Type of Outpatient Services occupational therapy;physical therapy;speech language pathology   Plan   Plan POA/friends Thelma will be staying with her upon d/c and then a friend from out of state will come to care for her.  She will have [facility] initially and will then go to [facility] for OP services at the [place] location.  This CM not here on 7/13 so flowsheet completed today.   Patient/Family in Agreement with Plan yes

## 2023-07-12 NOTE — PROGRESS NOTES
Patient: Jennifer Sams  Location: [facility] [address]  MRN: [id number]  Today's date: 7/12/2023    History of Present Illness  Jennifer is a 78 y.o. female admitted on 7/1/2023 with Acute ischemic left MCA stroke (CMS/HCC) [I63.512]. Principal problem is Acute ischemic left MCA stroke (CMS/HCC).    The patient is a 78-year-old  female with a history of AAA, heart block s/p cardiac pacemaker, glaucoma, hyperlipidemia, coronary artery disease, NSTEMI, who presented to [facility] on June 26 after she has not been seen by her friends for a few days.  Patient lives alone and when she was found by EMS she was confused and combative.  In the emergency department she was aphasic with right-sided weakness and hypertensive to the 190 systolic.  CT scan of the head revealed an acute infarct in left MCA with mild bleeding.  Echo showed an EF of 43% and a bubble study was negative.  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  Later this was transitioned to apixaban.  She was continued on statin for secondary prevention.  She was evaluated by speech therapy and cleared for a soft and bite-size diet with moderately thick liquids.    7/2/2023:  Diet SB6 with mildly thick liquids (MT2)  7/6/2023: Upgraded to EC7 with thin liquids    Past Medical History  Jennifer has a past medical history of AAA (abdominal aortic aneurysm), Arthritis, Elevated blood pressure reading without diagnosis of hypertension (4/19/2019), Epistaxis (1/6/2020), GERD (gastroesophageal reflux disease) (4/19/2019), Glaucoma (4/19/2019), Heart murmur, Hiatal hernia, History of hip replacement, total, right (4/19/2019), History of rheumatic fever as a child (4/19/2019), Hypercholesterolemia (4/19/2019), Ischemic cardiomyopathy (5/9/2019), Kidney cysts, Osteoarthritis of multiple joints (4/19/2019), Osteoporosis, Pacemaker (12/9/2019),  Raynaud's disease (4/19/2019), RSD (reflex sympathetic dystrophy) (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        SLP Pain    Date/Time Pain Type Rating: Rest Rating: Activity [facility]   07/12/23 0934 Pain Assessment 0 - no pain 0 - no pain CMP   07/12/23 0958 Pain Reassessment 0 - no pain 0 - no pain CMP          Prior Living Environment    Flowsheet Row Most Recent Value   People in Home alone   Current Living Arrangements home   Home Accessibility --  [2 SH with 1 DARÍO]   Living Environment Comment Per chart review, pt lives alone in a 2SH with 1STE   Number of Stairs, Main Entrance 1   Number of Stairs, Within Home, Primary 12  [Full fight to second floor]          Prior Level of Function    Flowsheet Row Most Recent Value   Dominant Hand --  [Unable to determine 2/2 cognitive/communication deficits]   Ambulation independent   Transferring independent   Toileting independent   Bathing independent   Dressing independent   Eating independent   IADLs independent   Communication understands/communicates without difficulty   Swallowing swallows foods/liquids without difficulty   Baseline Diet/Method of Nutritional Intake no diet restrictions   Past History of Dysphagia no   Prior Level of Function Comment Per chart review: PMH SB6,  MT2,  No VFSS in EMR.  Pt seen by SLP at [facility].   Assistive Device Currently Used at Home --  [Per chart review pt has RW at home - unclear if pt was using AD and pt unable to state PLOF at this time]           IRF SLP Evaluation and Treatment - 07/12/23 0935        SLP Time Calculation    Start Time 0930     Stop Time 1000     Time Calculation (min) 30 min        Session Details    Document Type Daily Treatment/Progress Note     Mode of Treatment speech language pathology;individual therapy        General Information    General Observations of Patient Pt received in Map Dining Room, walking with gait  "belt. Handoff from/to 1:1 at beginning and end of session.        Auditory Comprehension    Auditory Comprehension Intervention comprehension of single words;comprehension of questions/sentences     Comment, Intervention (Auditory Comprehension) Pt engaged in structured regarding her cat, Loan. Pt benefited from intermittent repetition of questions and use of gestures to improve comprehension. Did not benefit from single written words to aid in verbal expression. Pt identified object in FO3 given object function with 9/10 acc. I'ly improving to 10/10 given visual cue. Pt identifed 23/24 pictured objects of the LARK box in a fo4 given verbal function of object. Accuracy increased given paired verbal function and gesture.        Verbal Expression    Comment, Intervention (Verbal Expression) Communication breakdown occurred with pt frequently using a gesure of tapping a table several times down a line. Given 5 min of attempting to determine context of message, session ended and SLP and pt discussed returning to conversation at later time. Spontaneous output consisted of \"I am, no, yes, you, me, woah\").        Daily Progress Summary (SLP)    Daily Outcome Statement Good participation. Pt with great performance (9/10 and 23/24) on object and picture identification tasks given object function. Limited verbal output, pt perseverative on \"I and you\" with accompanying gestures. ST to continue targeting communication and swallowing goals per POC.                      IRF SLP Goals    Flowsheet Row Most Recent Value   Auditory Comprehension Goal 1    Auditory Comprehension Goal 1 pt will complete basic Aud comprehension tasks (simple yes/no 75%),  1 step commands 50% with max cues,  match word heard to picture f/o 2 50% max cues at 07/02/2023 1001   Time Frame short-term goal (STG), 1 week at 07/02/2023 1001   Progress/Outcome goal met at 07/11/2023 0813   Auditory Comprehension Goal 2    Auditory Comprehension Goal 2 Pt " will understand mod-complex level y/n questions 90% min cues,  2 step commands 75% min-mod cues,  at 07/02/2023 1001   Time Frame long-term goal (LTG), 4 weeks at 07/02/2023 1001   Progress/Outcome goal ongoing at 07/11/2023 0813   Verbal Expression Goal 1    Verbal Expression Goal 1 Pt will complete basic VE tasks (name. automatics, simple opposites, naming) with 20% accuracy and max cues. at 07/02/2023 1001   Time Frame short-term goal (STG), 1 week at 07/02/2023 1001   Progress/Outcome progress slower than expected at 07/11/2023 0813   Verbal Expression Goal 2    Verbal Expression Goal 2 Pt will complete Verbal expression tasks (basic-mod) with 50-75% accuracy and min-mod cues.  Pt will imitate vowels 50% max cues,  at 07/02/2023 1001   Time Frame long-term goal (LTG), 4 weeks at 07/02/2023 1001   Progress/Outcome goal ongoing at 07/11/2023 0813   Motor Speech/Voice Goal 1    Motor Speech/Voice Goal 1 Repetition of vowels, CV/VC and CVC targets with max cues and models for 60% acc. at 07/04/2023 1510   Time Frame short-term goal (STG), 2 weeks at 07/04/2023 1510   Progress/Outcome progress slower than expected at 07/11/2023 0813   Motor Speech/Voice Goal 2    Motor Speech/Voice Goal 2 Repetition of vowels, CV/VC and CVC targets with min cues and models for 90% acc. at 07/04/2023 1510   Time Frame long-term goal (LTG), 4 weeks at 07/04/2023 1510   Progress/Outcome goal ongoing at 07/11/2023 0813   Reading Comprehension Goal 1    Reading Comprehension Goal 1 Pt will match word to picture field of 2 with 50% accuracy and mod-max cues,  Pt will read single functional words with 20% accuracy max cues at 07/02/2023 1001   Time Frame short-term goal (STG), 1 week at 07/02/2023 1001   Progress/Outcome goal no longer appropriate at 07/04/2023 1510   Reading Comprehension Goal 2    Reading Comprehension Goal 2 Pt will read simple sentences and comprehend simple sentences with 75% accuracy min-mod cues at 07/02/2023 1001    Time Frame long-term goal (LTG) at 07/02/2023 1001   Progress/Outcome goal no longer appropriate at 07/04/2023 1510   Written Expression Goal 1    Written Expression Goal 1 Pt will copy first name, shapes, letters and 3-4 letter words with 80% min cues,  pt will copy letters, name shapes with 50% accuracy and mod cues. at 07/02/2023 1001   Time Frame short-term goal (STG), 1 week at 07/02/2023 1001   Progress/Outcome goal no longer appropriate at 07/04/2023 1510   Written Expression Goal 2    Written Expression Goal 2 Pt will write first and last name,  single words 90% accuracy at 07/02/2023 1001   Progress/Outcome goal no longer appropriate at 07/04/2023 1510   Oral Nutrition/Hydration Goal 1    Activity effective/safe, use of swallowing techniques, management of texture/viscosity, other (see comments)  [SB6,  MT2.  Trials of thins and various textures by speech with min s/s of aspiration] at 07/02/2023 1001   Time Frame short-term goal (STG), 14 days or less at 07/02/2023 1001   Progress/Outcome goal met at 07/11/2023 0813   Oral Nutrition/Hydration Goal 2    Activity effective/safe, oral nutrition/hydration, use of swallowing techniques, management of texture/viscosity, other (see comments)  [regular and thins] at 07/02/2023 1001   Time Frame long-term goal (LTG), 4 weeks at 07/02/2023 1001   Progress/Outcome continuing progress toward goal at 07/11/2023 0813   Oral Motor Exercise Goal 1    Activity perform oral motor strengthening exercises, facial/cheek, lip, tongue, strength/ROM, mastication/bolus manipulation, 5-10 times per session, other (see comments)  [use mirror/visual model] at 07/02/2023 1001   [place] with 1:1 supervision/constant cues at 07/02/2023 1001   Time Frame short-term goal (STG), 14 days or less at 07/02/2023 1001   Progress/Outcome goal met at 07/11/2023 0813   Oral Motor Exercise Goal 2    Activity perform oral motor strengthening exercises, facial/cheek, lip, tongue, strength/ROM,  mastication/bolus manipulation, other (see comments)  [2-3 times a day sets of 10-15] at 07/02/2023 1001   [place] with minimal cues (75-90% accuracy) at 07/02/2023 1001   Time Frame long-term goal (LTG), 4 weeks at 07/02/2023 1001   Progress/Outcome goal ongoing at 07/11/2023 0813

## 2023-07-12 NOTE — PLAN OF CARE
CM spoke to pt and POA following the team mtg.  Both are prepared for d/c to home on 7/14.  POA/Friend thelma will be staying with her initially upon d/c and then a friedn form out of state will come to stay.  Thelma is coming for FTR on 7/13.  [facility] to follow upon d/c.  A list of eligible providers was presented and reviewed with the patient and/or patient guardian/advocate in written and verbal form. The list delineates providers in the patient's desired geographic area who are participating in the Medicare program and/or providers contracted with the patient's primary insurance. The Medicare list and quality ratings were obtained from the Medicare.gov [medicare.gov] website. She will attend future appts at [facility] for OP care.  CM following for any further d/c planning needs.

## 2023-07-12 NOTE — PROGRESS NOTES
Patient: Jennifer Sams  Location: [facility] [address]  MRN: [id number]  Today's date: 7/12/2023    History of Present Illness  Jennifer is a 78 y.o. female admitted on 7/1/2023 with Acute ischemic left MCA stroke (CMS/HCC) [I63.512]. Principal problem is Acute ischemic left MCA stroke (CMS/HCC).    The patient is a 78-year-old  female with a history of AAA, heart block s/p cardiac pacemaker, glaucoma, hyperlipidemia, coronary artery disease, NSTEMI, who presented to [facility] on June 26 after she has not been seen by her friends for a few days.  Patient lives alone and when she was found by EMS she was confused and combative.  In the emergency department she was aphasic with right-sided weakness and hypertensive to the 190 systolic.  CT scan of the head revealed an acute infarct in left MCA with mild bleeding.  Echo showed an EF of 43% and a bubble study was negative.  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  Later this was transitioned to apixaban.  She was continued on statin for secondary prevention.  She was evaluated by speech therapy and cleared for a soft and bite-size diet with moderately thick liquids.    7/2/2023:  Diet SB6 with mildly thick liquids (MT2)  7/6/2023: Upgraded to EC7 with thin liquids    Past Medical History  Jennifer has a past medical history of AAA (abdominal aortic aneurysm), Arthritis, Elevated blood pressure reading without diagnosis of hypertension (4/19/2019), Epistaxis (1/6/2020), GERD (gastroesophageal reflux disease) (4/19/2019), Glaucoma (4/19/2019), Heart murmur, Hiatal hernia, History of hip replacement, total, right (4/19/2019), History of rheumatic fever as a child (4/19/2019), Hypercholesterolemia (4/19/2019), Ischemic cardiomyopathy (5/9/2019), Kidney cysts, Osteoarthritis of multiple joints (4/19/2019), Osteoporosis, Pacemaker (12/9/2019),  Raynaud's disease (4/19/2019), RSD (reflex sympathetic dystrophy) (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        OT Vitals    Date/Time Pulse HR Source BP BP Location BP Method Pt Position [facility]   07/12/23 1034 79 Monitor 144/65 Left upper arm Automatic Sitting [place]      OT Pain    Date/Time Pain Type Rating: Rest Rating: Activity [facility]   07/12/23 1034 Pain Assessment 0 - no pain 0 - no pain [place]   07/12/23 1128 Pain Reassessment 0 - no pain 0 - no pain AK          Prior Living Environment    Flowsheet Row Most Recent Value   People in Home alone   Current Living Arrangements home   Home Accessibility --  [2 SH with 1 DARÍO]   Living Environment Comment Per chart review, pt lives alone in a 2SH with 1STE   Number of Stairs, Main Entrance 1   Number of Stairs, Within Home, Primary 12  [Full fight to second floor]          Prior Level of Function    Flowsheet Row Most Recent Value   Dominant Hand --  [Unable to determine 2/2 cognitive/communication deficits]   Ambulation independent   Transferring independent   Toileting independent   Bathing independent   Dressing independent   Eating independent   IADLs independent   Communication understands/communicates without difficulty   Swallowing swallows foods/liquids without difficulty   Baseline Diet/Method of Nutritional Intake no diet restrictions   Past History of Dysphagia no   Prior Level of Function Comment Per chart review: PMH SB6,  MT2,  No VFSS in EMR.  Pt seen by SLP at [facility].   Assistive Device Currently Used at Home --  [Per chart review pt has RW at home - unclear if pt was using AD and pt unable to state PLOF at this time]          Occupational Profile    Flowsheet Row Most Recent Value   Occupational History/Life Experiences Will need to clarify PLOF/driving status. Pt unable to state 2/2 cognitive/communication deficits.   Environmental Supports and Barriers Per chart review, pt  lives alone but has supportive friends.   Patient Goals Pt unable to state goal 2/2 communication deficits.           IRF OT Evaluation and Treatment - 07/12/23 1034        OT Time Calculation    Start Time 1030     Stop Time 1130     Time Calculation (min) 60 min        Session Details    Document Type Daily Treatment/Progress Note     Mode of Treatment occupational therapy;individual therapy        General Information    General Observations of Patient Pt received walking to [employer] c 1:1. Direct handoff pre/post session to 1:1.        Mobility Belt    Mobility Belt Used for All Out of Bed Activity no     Reason Mobility Belt Not Used patient refused        Wrist/Hand Orthosis Management    Fabrication Comment (Wrist/Hand Orthosis) Pt received c pre-velia wrist cock up splint donned. OT doffed for R UE ROM. Remained doffed upon end of session for skin intergrity and 2/2 noted active wrist movement.        Bed Mobility    Comment OT: Sit EOB > supine c Mod I.        Sit to Stand Transfer    [place], Sit to Stand Transfer supervision     Safety/Cues impulsivity     Assistive Device none     Comment From EOM        Stand to Sit Transfer    [place], Stand to Sit Transfer supervision     Safety/Cues impulsivity     Assistive Device none     Comment To EOM and EOB.        Stand Pivot Transfer    [place], Stand Pivot/Stand Step Transfer close supervision     Safety/Cues impulsivity     Assistive Device none     Comment Via amb approach c Cl S 2/2 mild instability and decreased safety awareness.        Impairments/Safety Issues    Comment, Safety Issues/Impairments OT: Medium distance HHM completed within therapy gym/unit hallways c Cl S s AD.        Neuromuscular Re-education    Interventions vibration;pattern movements;closed chain;weight bearing     Positioning sitting     Equipment Used vibration wand     Comment (1) Vibration applied at wrist extensors. AAROM closed chain wrist flexion/extension  repeated mass reps c noted active wrist extension. (2) R UE palmar WB on anterior table while standing and EOM while sitting EOM while reaching superiorlaterally to L c L UE x 40 reps total. (3) Pattern movement towel slides shoulder flexion/extension on anterior malaika bench while seated EOM c L UE over  R UE to facilitate R UE WB, ROM and attention.Completed 3x10.        Upper Extremity (Therapeutic Exercise)    Exercise Position/Type seated;AAROM (active assistive range of motion)     General Exercise right     Range of Motion Exercises wrist flexion/extension     Comment Max tactile cues to isolate wrist movement and limit compensatory movements 2/2 communication/cognitive deficits. OT limiting compensatory movements at forearm and stabilizing pt's R UE on OT's leg. AAROM wrist flex/extension mass reps completed c active wrist extension noted.        Hand (Therapeutic Exercise)    Exercise Position/Type seated;PROM (passive range of motion)     General Exercise right;finger flexion/extension;thumb opposition     Comment Gentle PROM completed while seated EOM, no active movement noted.        Daily Progress Summary (OT)    Daily Outcome Statement Pt tolerated session well c focus on R UE NMRE/ROM and functional transfers/mobility. Noted pt demo active wrist extension this session. OT discontinuing R pre-velia wrist cock up splint 2/2 return of active movement in wrist and no noted active movement at digits. Continue OT POC.                           IRF OT Goals    Flowsheet Row Most Recent Value   Transfer Goal 1    Activity/Assistive Device toilet at 07/02/2023 0711   [place] supervision required at 07/11/2023 0730   Time Frame short-term goal (STG), 5 - 7 days at 07/11/2023 0730   Progress/Outcome goal met, goal revised this date at 07/11/2023 0730   Transfer Goal 2    Activity/Assistive Device toilet at 07/02/2023 0711   [place] supervision required at 07/02/2023 0711   Time Frame long-term goal (LTG),  14 days or less at 07/02/2023 0711   Progress/Outcome goal ongoing at 07/11/2023 0730   Transfer Goal 3    Activity/Assistive Device shower at 07/02/2023 0711   [place] --  [Cl S] at 07/02/2023 0711   Time Frame short-term goal (STG), 5 - 7 days at 07/11/2023 0730   Progress/Outcome goal ongoing at 07/11/2023 0730   Transfer Goal 4    Activity/Assistive Device shower at 07/02/2023 0711   [place] supervision required at 07/02/2023 0711   Time Frame long-term goal (LTG), 14 days or less at 07/02/2023 0711   Progress/Outcome goal ongoing at 07/11/2023 0730   Bathing Goal 1    [place] --  [Steadying A] at 07/02/2023 0711   Time Frame short-term goal (STG), 5 - 7 days at 07/11/2023 0730   Progress/Outcome goal ongoing at 07/11/2023 0730   Bathing Goal 2    [place] supervision required at 07/02/2023 0711   Time Frame long-term goal (LTG), 14 days or less at 07/02/2023 0711   Progress/Outcome goal ongoing at 07/11/2023 0730   UB Dressing Goal 1    [place] --  [Cl S] at 07/05/2023 0702   Time Frame short-term goal (STG), 5 - 7 days at 07/11/2023 0730   Progress/Outcome goal ongoing at 07/11/2023 0730   UB Dressing Goal 2    [place] set-up required at 07/02/2023 0711   Time Frame long-term goal (LTG), 14 days or less at 07/02/2023 0711   Progress/Outcome goal ongoing at 07/11/2023 0730   LB Dressing Goal 1    [place] --  [Cl S] at 07/05/2023 0702   Time Frame short-term goal (STG), 5 - 7 days at 07/11/2023 0730   Progress/Outcome goal ongoing at 07/11/2023 0730   LB Dressing Goal 2    [place] supervision required at 07/02/2023 0711   Time Frame long-term goal (LTG), 14 days or less at 07/02/2023 0711   Progress/Outcome goal ongoing at 07/11/2023 0730   Grooming Goal 1    [place] supervision required at 07/11/2023 0730   Time Frame short-term goal (STG), 5 - 7 days at 07/11/2023 0730   Progress/Outcome goal met, goal revised this date at 07/11/2023 0730   Grooming Goal 2     [place] set-up required at 07/02/2023 0711   Time Frame long-term goal (LTG), 14 days or less at 07/02/2023 0711   Progress/Outcome goal ongoing at 07/11/2023 0730   Toileting Goal 1    [place] supervision required at 07/11/2023 0730   Time Frame short-term goal (STG), 5 - 7 days at 07/11/2023 0730   Progress/Outcome goal met, goal revised this date at 07/11/2023 0730   Toileting Goal 2    [place] supervision required at 07/02/2023 0711   Time Frame long-term goal (LTG), 14 days or less at 07/02/2023 0711   Progress/Outcome goal ongoing at 07/11/2023 0730

## 2023-07-12 NOTE — PLAN OF CARE
Problem: Oral Intake Inadequate  Goal: Improved Oral Intake  Intervention: Promote and Optimize Oral Intake  Flowsheets (Taken 7/12/2023 1151)  Oral Nutrition Promotion: other (see comments)   Nutrition Interventions/ Recommendations:   1. Diet level per ST, (EC7, thin)  2. Some Food preferences noted from discussion with friend, Thelma (family /friends can bring in soft foods)  4. Will change oral supplement and check acceptance (carnation inst breakfast, van fresh milk shake BID)  5. Monitor PO, weight, labs, skin  6. Calorie count recommended 7/7  7. Add MVI/mineral     Nutritional Status/Malnutrition: Acute illness or Injury - Severe Malnutrition  Based on 8% weight loss X 11 days, PO intake < 50% for greater than 5 days.

## 2023-07-12 NOTE — PROGRESS NOTES
Patient: Jennifer Sams  Location: [facility] [address]  MRN: [id number]  Today's date: 7/12/2023    History of Present Illness  Jennifer is a 78 y.o. female admitted on 7/1/2023 with Acute ischemic left MCA stroke (CMS/HCC) [I63.512]. Principal problem is Acute ischemic left MCA stroke (CMS/HCC).    The patient is a 78-year-old  female with a history of AAA, heart block s/p cardiac pacemaker, glaucoma, hyperlipidemia, coronary artery disease, NSTEMI, who presented to [facility] on June 26 after she has not been seen by her friends for a few days.  Patient lives alone and when she was found by EMS she was confused and combative.  In the emergency department she was aphasic with right-sided weakness and hypertensive to the 190 systolic.  CT scan of the head revealed an acute infarct in left MCA with mild bleeding.  Echo showed an EF of 43% and a bubble study was negative.  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  Later this was transitioned to apixaban.  She was continued on statin for secondary prevention.  She was evaluated by speech therapy and cleared for a soft and bite-size diet with moderately thick liquids.    7/2/2023:  Diet SB6 with mildly thick liquids (MT2)  7/6/2023: Upgraded to EC7 with thin liquids    Past Medical History  Jennifer has a past medical history of AAA (abdominal aortic aneurysm), Arthritis, Elevated blood pressure reading without diagnosis of hypertension (4/19/2019), Epistaxis (1/6/2020), GERD (gastroesophageal reflux disease) (4/19/2019), Glaucoma (4/19/2019), Heart murmur, Hiatal hernia, History of hip replacement, total, right (4/19/2019), History of rheumatic fever as a child (4/19/2019), Hypercholesterolemia (4/19/2019), Ischemic cardiomyopathy (5/9/2019), Kidney cysts, Osteoarthritis of multiple joints (4/19/2019), Osteoporosis, Pacemaker (12/9/2019),  Raynaud's disease (4/19/2019), RSD (reflex sympathetic dystrophy) (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        SLP Pain    Date/Time Pain Type Rating: Rest Rating: Activity [facility]   07/12/23 1445 Pain Assessment 0 - no pain 0 - no pain CMP   07/12/23 1458 Pain Reassessment 0 - no pain 0 - no pain CMP          Prior Living Environment    Flowsheet Row Most Recent Value   People in Home alone   Current Living Arrangements home   Home Accessibility --  [2 SH with 1 DARÍO]   Living Environment Comment Per chart review, pt lives alone in a 2SH with 1STE   Number of Stairs, Main Entrance 1   Number of Stairs, Within Home, Primary 12  [Full fight to second floor]          Prior Level of Function    Flowsheet Row Most Recent Value   Dominant Hand --  [Unable to determine 2/2 cognitive/communication deficits]   Ambulation independent   Transferring independent   Toileting independent   Bathing independent   Dressing independent   Eating independent   IADLs independent   Communication understands/communicates without difficulty   Swallowing swallows foods/liquids without difficulty   Baseline Diet/Method of Nutritional Intake no diet restrictions   Past History of Dysphagia no   Prior Level of Function Comment Per chart review: PMH SB6,  MT2,  No VFSS in EMR.  Pt seen by SLP at [facility].   Assistive Device Currently Used at Home --  [Per chart review pt has RW at home - unclear if pt was using AD and pt unable to state PLOF at this time]           IRF SLP Evaluation and Treatment - 07/12/23 1443        SLP Time Calculation    Start Time 1440     Stop Time 1500     Time Calculation (min) 20 min        Session Details    Document Type Daily Treatment/Progress Note     Mode of Treatment speech language pathology;individual therapy        General Information    General Observations of Patient Pt received in direct handoff from 1:1 and OT, handoff  "to 1:1 at end of session. Pleasant and cooperative.        Verbal Expression    Verbal Expression Intervention word retrieval activities     Comment, Intervention (Verbal Expression) At the beginning of session, pt utilized intonation and gestures to communicate dislike of hand splint provided by OT. Pt conveying that she would not use the splint at night, but was slighly considering the option to wear it while watching television.Using elements of VICTORIA, pt tapped along with L hand to three beat tone while SLP sang \"how are you\". Pt hummed along to beat on 10 repetitions. In unison with SLP while tapping hand, pt produced \"how\" and \"you\" on 10/15 opportunities. Increased difficulty producing \"are\", pt perseverating on \"you\". SLP explained that producing \"how you\" can be functional in conversation context with friends, but pt was adamant that she was too frustrated over not producing \"are\" to use in conversation.        Written Language    Written Language Intervention word level     Comment, Intervention (Written Language) Given alphabet tiles, pt copied the spelling for 3 letter words with 5/5 acc. given increased time. Pt also copied the spelling for her full name given the correct letter tiles.        Daily Progress Summary (SLP)    Daily Outcome Statement Good participation. Session focus on langauge. Pt copied the spelling for 3 letter words using letter tiles with 100% acc. Improved imitation of single words using elements of melodic intonantion therapy this date. ST to continue targeting communication and swallowing goals per POC.                      IRF SLP Goals    Flowsheet Row Most Recent Value   Auditory Comprehension Goal 1    Auditory Comprehension Goal 1 pt will complete basic Aud comprehension tasks (simple yes/no 75%),  1 step commands 50% with max cues,  match word heard to picture f/o 2 50% max cues at 07/02/2023 1001   Time Frame short-term goal (STG), 1 week at 07/02/2023 1001   Progress/Outcome " goal met at 07/11/2023 0813   Auditory Comprehension Goal 2    Auditory Comprehension Goal 2 Pt will understand mod-complex level y/n questions 90% min cues,  2 step commands 75% min-mod cues,  at 07/02/2023 1001   Time Frame long-term goal (LTG), 4 weeks at 07/02/2023 1001   Progress/Outcome goal ongoing at 07/11/2023 0813   Verbal Expression Goal 1    Verbal Expression Goal 1 Pt will complete basic VE tasks (name. automatics, simple opposites, naming) with 20% accuracy and max cues. at 07/02/2023 1001   Time Frame short-term goal (STG), 1 week at 07/02/2023 1001   Progress/Outcome progress slower than expected at 07/11/2023 0813   Verbal Expression Goal 2    Verbal Expression Goal 2 Pt will complete Verbal expression tasks (basic-mod) with 50-75% accuracy and min-mod cues.  Pt will imitate vowels 50% max cues,  at 07/02/2023 1001   Time Frame long-term goal (LTG), 4 weeks at 07/02/2023 1001   Progress/Outcome goal ongoing at 07/11/2023 0813   Motor Speech/Voice Goal 1    Motor Speech/Voice Goal 1 Repetition of vowels, CV/VC and CVC targets with max cues and models for 60% acc. at 07/04/2023 1510   Time Frame short-term goal (STG), 2 weeks at 07/04/2023 1510   Progress/Outcome progress slower than expected at 07/11/2023 0813   Motor Speech/Voice Goal 2    Motor Speech/Voice Goal 2 Repetition of vowels, CV/VC and CVC targets with min cues and models for 90% acc. at 07/04/2023 1510   Time Frame long-term goal (LTG), 4 weeks at 07/04/2023 1510   Progress/Outcome goal ongoing at 07/11/2023 0813   Reading Comprehension Goal 1    Reading Comprehension Goal 1 Pt will match word to picture field of 2 with 50% accuracy and mod-max cues,  Pt will read single functional words with 20% accuracy max cues at 07/02/2023 1001   Time Frame short-term goal (STG), 1 week at 07/02/2023 1001   Progress/Outcome goal no longer appropriate at 07/04/2023 1510   Reading Comprehension Goal 2    Reading Comprehension Goal 2 Pt will read  simple sentences and comprehend simple sentences with 75% accuracy min-mod cues at 07/02/2023 1001   Time Frame long-term goal (LTG) at 07/02/2023 1001   Progress/Outcome goal no longer appropriate at 07/04/2023 1510   Written Expression Goal 1    Written Expression Goal 1 Pt will copy first name, shapes, letters and 3-4 letter words with 80% min cues,  pt will copy letters, name shapes with 50% accuracy and mod cues. at 07/02/2023 1001   Time Frame short-term goal (STG), 1 week at 07/02/2023 1001   Progress/Outcome goal no longer appropriate at 07/04/2023 1510   Written Expression Goal 2    Written Expression Goal 2 Pt will write first and last name,  single words 90% accuracy at 07/02/2023 1001   Progress/Outcome goal no longer appropriate at 07/04/2023 1510   Oral Nutrition/Hydration Goal 1    Activity effective/safe, use of swallowing techniques, management of texture/viscosity, other (see comments)  [SB6,  MT2.  Trials of thins and various textures by speech with min s/s of aspiration] at 07/02/2023 1001   Time Frame short-term goal (STG), 14 days or less at 07/02/2023 1001   Progress/Outcome goal met at 07/11/2023 0813   Oral Nutrition/Hydration Goal 2    Activity effective/safe, oral nutrition/hydration, use of swallowing techniques, management of texture/viscosity, other (see comments)  [regular and thins] at 07/02/2023 1001   Time Frame long-term goal (LTG), 4 weeks at 07/02/2023 1001   Progress/Outcome continuing progress toward goal at 07/11/2023 0813   Oral Motor Exercise Goal 1    Activity perform oral motor strengthening exercises, facial/cheek, lip, tongue, strength/ROM, mastication/bolus manipulation, 5-10 times per session, other (see comments)  [use mirror/visual model] at 07/02/2023 1001   [place] with 1:1 supervision/constant cues at 07/02/2023 1001   Time Frame short-term goal (STG), 14 days or less at 07/02/2023 1001   Progress/Outcome goal met at 07/11/2023 0813   Oral Motor Exercise Goal  2    Activity perform oral motor strengthening exercises, facial/cheek, lip, tongue, strength/ROM, mastication/bolus manipulation, other (see comments)  [2-3 times a day sets of 10-15] at 07/02/2023 1001   [place] with minimal cues (75-90% accuracy) at 07/02/2023 1001   Time Frame long-term goal (LTG), 4 weeks at 07/02/2023 1001   Progress/Outcome goal ongoing at 07/11/2023 0813

## 2023-07-12 NOTE — PROGRESS NOTES
[facility] Internal Medicine Progress Note          Patient was seen and examined.     Ms Sams is a 78-year-old female with ischemic cardiomyopathy, heart block s/p PPM, CAD, AAA, hypertension, GERD who was admitted to [facility] 6/26 with altered mental status.  She was noted to be aphasic with right-sided weakness  Initial CT scan concerning for acute infarct in the posterior aspect of the left MCA territory with associated thrombosed vessel  Follow-up CT head revealed acute/subacute infarct in the left MCA with 3 mm midline shift, no definite hemorrhage  She was started on aspirin and heparin drip  Noted dysphagia, speech therapy recommends soft and bite-size diet with mildly thick liquids  Echocardiogram 6/27 reported LVEF 43%, localized apical aneurysm containing thrombus which most likely caused her acute CVA.  She was transitioned to Eliquis, her aspirin was discontinued  She continues on Crestor for dyslipidemia  She is now referred to [facility] for acute inpatient rehabilitation      Subjective      Patient without nursing issues overnight.  Continues with one-to-one sitter for impulsivity and safety.  Patient comfortable this morning.  Continues with expressive aphasia.  Able to make needs known.  Blood pressures well controlled.  Patient remains interactive with therapies and continues to progress.    Objective     Vital signs in last 24 hours:  Temp:  [36.6 °C (97.8 °F)] 36.6 °C (97.8 °F)  Heart Rate:  [] 108  Resp:  [18] 18  BP: (122-144)/(60-91) 125/91     Allergies   Allergen Reactions   • Ace Inhibitors Other (see comments)     cough   • Atorvastatin      Nausea and can'trecall   • Brilinta [Ticagrelor]      SOB   • Codeine      Nausea and Vomiting   • Dilaudid [Hydromorphone]      Nausea & vomiting   • Morphine Sulfate Nausea Only and GI intolerance   • Onion      Severe abdominal pain   • Oxycodone      Nausea & vomiting, dizziness     Medications:      • apixaban   5 mg oral BID   • cycloSPORINE  1 drop Both Eyes q12h ROBERTO   • lansoprazole  30 mg oral Daily before breakfast   • melatonin  3 mg oral Nightly   • rosuvastatin  20 mg oral Nightly   • trazodone  25 mg oral Nightly         Objective      Full Code    Physical Exam  HEENT: PERRLA, EOMI, sclera anicteric, mucous membranes moist  Neck: Supple, JVP less than 5 cm  Heart: Regular rhythm, no murmurs, rubs or gallops  Lungs: Clear to auscultation  Abdomen: Normal bowel sounds, soft, nontender, no rebound or guarding  Extremities: No clubbing, cyanosis or edema  Neuro: Patient alert, expressive aphasia, following simple commands with right-sided weakness    Lab Results   Component Value Date    GLUCOSE 90 07/10/2023    CALCIUM 8.9 07/10/2023     07/10/2023    K 4.2 07/10/2023    CO2 27 07/10/2023     07/10/2023    BUN 11 07/10/2023    CREATININE 0.8 07/10/2023       Lab Results   Component Value Date    WBC 5.12 07/10/2023    HGB 15.0 07/10/2023    HCT 45.0 07/10/2023    MCV 93.8 07/10/2023     07/10/2023       Chest x-ray 6/26:  Impression:  No active disease in the chest     CT head stroke alert 6/26:  IMPRESSION:   1. Findings highly concerning for an acute infarct in the posterior aspect of the left MCA territory with an associated thrombosed vessel.   2. Chronic microangiopathy and involutional changes.     CTA head/neck 6/26, CT brain perfusion:  IMPRESSION:   1. Atherosclerosis at the carotid bifurcations with mild luminal narrowing measuring approximately 25% on the right and 20% on the left using NASCET criteria. Patent cervical carotid and vertebral arteries.   2. Termination of flow in an M3 branch of the left middle cerebral artery.   3. Intracranial atherosclerosis with significant narrowing involving the left V4 segment after the takeoff of the posterior inferior cerebellar artery. Mild irregularity of the basilar artery.   4. Completed infarct in the posterior aspect of the left MCA  territory. No evidence of ischemic penumbra. rCBF <30% : 0 cc TMax > 6s: 2 cc Mismatch volume: 2 cc Mismatch Ratio: NA   5. Additional chronic and incidental findings, as above. In order to accelerate response time, other vascular pathology including occlusions of more distal arteries are not completely evaluated on this examination.     CT head 6/26:  IMPRESSION:   Evolving left MCA territory infarct.  Small hyperdense foci within the infarct may reflect contrast staining from interval angiography versus punctate hemorrhage.  Short interval follow-up recommended.     CT head 6/27:  IMPRESSION:   Acute/subacute left MCA distribution infarct with 3 mm midline shift.. No definite hemorrhage    Transthoracic echo 6/27:  Left ventricle with normal dimensions and regional contraction abnormalities consistent with CAD in the LAD distribution: Localized apical infarction with aneurysm formation.  Paradoxical septal movement is consistent with ventricular pacing.  There is moderate left ventricular systolic and mild diastolic dysfunction.  LVEF 43% Left ventricular apical thrombus Left atrial pressure 14 mmHg There is a visual suggestion of mild aortic stenosis. (Doppler assessment of the aortic valve is suboptimal) Mild mitral regurgitation Normal right ventricle dimensions and systolic function: TAPSE 1.84 cm Mild tricuspid regurgitation PASP 34 mmHg Normal left atrial volume index No pericardial effusion or vegetations AS-V pacing Technically difficult study: No parasternal imaging obtained This study was performed with Definity contrast to optimize evaluation of regional and global left ventricular function No significant change from 3/28/2023 except for current demonstration of left ventricular apical thrombus    Assessment     Neuro:  Left MCA CVA  -Initial CT with concern for possible bleed, repeat scan without hemorrhage but 3 mm midline shift  -Continue Eliquis, statin, blood pressure management for secondary  prevention  -Consult neurology     Cardiovascular:  Echo 6/27 showing LVEF 43%, localized apical aneurysm containing thrombus, possible mild AAS, mild MR, normal RV, PASP 34  -Has been started on Eliquis, aspirin discontinued  Dysrhythmia, history of heart block, s/p [employer] pacemaker  -Follows with Dr. Carolina as outpatient  CAD  -NSTEMI 4/26/19, 3vCAD, s/p FRANK x2 LAD, FRANK x4 RCA, FRANK x1LCx OM2  Ischemic cardiomyopathy  -Previously recommended Entresto, but was cost prohibitive; valsartan also recommended but not started due to concern for dizziness     Pulmonary:  -Increased risk of aspiration due to dysphagia  -Maintain aspiration precautions  -Encourage use of incentive spirometry for prevention of atelectasis     Endocrine:  Dyslipidemia  -Continue Crestor, low-fat diet     Renal:  BUN/Cr 12/0.7 with GFR> 60  -Continue to monitor renal function, volume status     GI:  Dysphagia  -Soft and bite sized, mildly thick diet level  -Consult speech therapy  -Maintain aspiration precautions  KELLI/GI prophylaxis  -continue PPI therapy  Constipation  -Bowel program available as needed     :  Increased risk of urinary retention, UTI due to CVA  -Monitor for symptom complaint     Heme:  Anemia, normochromic, normocytic with normal RDW  -Monitor trend hemoglobin on apixaban  Thrombocytopenia  -Continue to monitor trend platelet count     F/E/N:  Soft and bite-size, mildly thick, cardiac diet  -Monitor electrolytes, hydration, nutritional status  -Increased risk of electrolyte abnormalities, dehydration due to altered diet  -Consult nutrition     Prophylaxis:  -Apixaban DVT prophylaxis  -PPI GI prophylaxis  -Spirometry for atelectasis  -Maintain fall, cardiac, aspiration precautions        Darrel Ramey MD  7/12/2023

## 2023-07-12 NOTE — PROGRESS NOTES
Nutrition Note         Clinical Course: Patient is a 78 y.o. female who was admitted on 7/1/2023 with a diagnosis of Acute ischemic left MCA stroke (CMS/[facility]) [I63.512].     Nutrition Interventions/ Recommendations:   1. Diet level per ST, (EC7, thin)  2. Some Food preferences noted from discussion with friend, Thelma (family /friends can bring in soft foods)  4. Will change oral supplement and check acceptance (carnation inst breakfast, van fresh milk shake BID)  5. Monitor PO, weight, labs, skin  6. Calorie count recommended 7/7  7. Add MVI/mineral    Nutritional Status/Malnutrition: Acute illness or Injury - Severe Malnutrition  Based on 8% weight loss X 11 days, PO intake < 50% for greater than 5 days.        Nutrition risk level 3    Past Medical History:   Diagnosis Date   • AAA (abdominal aortic aneurysm) (CMS/[facility])    • Arthritis    • Elevated blood pressure reading without diagnosis of hypertension 4/19/2019   • Epistaxis 1/6/2020   • GERD (gastroesophageal reflux disease) 4/19/2019   • Glaucoma 4/19/2019   • Heart murmur    • Hiatal hernia    • History of hip replacement, total, right 4/19/2019    Right hip 9/ 2016 and revision 2017    • History of rheumatic fever as a child 4/19/2019   • Hypercholesterolemia 4/19/2019   • Ischemic cardiomyopathy 5/9/2019   • Kidney cysts    • Osteoarthritis of multiple joints 4/19/2019   • Osteoporosis    • Pacemaker 12/9/2019   • Raynaud's disease 4/19/2019   • RSD (reflex sympathetic dystrophy) 4/19/2019    Of left foot   • SOB (shortness of breath) 12/10/2019   • Status post insertion of drug eluting coronary artery stent 5/9/2019     Past Surgical History:   Procedure Laterality Date   • CHOLECYSTECTOMY OPEN     • CORONARY ANGIOPLASTY WITH STENT PLACEMENT  04/29/2019    of LAD   • CORONARY ANGIOPLASTY WITH STENT PLACEMENT  04/30/2019    of RCA   • DILATION AND CURETTAGE OF UTERUS     • EYE SURGERY      RIGHT CATARACT   • FOOT SURGERY Left    • JOINT REPLACEMENT Right  "09/2016    total hip   • REVISION TOTAL HIP ARTHROPLASTY Right 2017   • TONSILLECTOMY              Dietary Orders   (From admission, onward)             Start     Ordered    07/07/23 1044  Adult Diet Easy to Chew EC7; Thin Liquids; RD/LDN may adjust order  Diet effective now        References:    IDDSI Diet reference   Question Answer Comment   Diet Texture Easy to Chew EC7    Fluid Consistency: Thin Liquids    Delegation of Authority. Diet orders written by PA/CRNPs may not be adjusted by RD/LDNs. RD/LDN may adjust order        07/07/23 1043                Reason for Assessment  Reason For Assessment: per organizational policy  Diagnosis:  (stroke)    [facility] Nutrition Screen Tool  Has patient lost weight without trying?: 2-->Unsure  Has patient been eating poorly due to decreased appetite?: 0-->No  [facility] Nutrition Screen Score: 2    Nutrition/Diet History  Typical Food/Fluid Intake: PTA: 2 meals; cooks own meals  Diet Prior to Admission: regular  Intake (%):  (0-25%)  Food Preferences: per friend Thelma  Meal/Snack Patterns: will be served 3 meals here  Supplemental Drinks/Foods/Additives: try carnation inst breakfast, van fresh shake BID  Vitamin/Mineral/Herbal Supplements: magox and KCL on hold  Food Allergies:  (onion allergy)  Factors Affecting Nutritional Intake: difficulty/impaired swallowing, impaired cognitive status/motor control    Physical Findings  Overall Physical Appearance: generalized wasting  Gastrointestinal:  (WDL)  Last Bowel Movement: 07/12/23  Skin:  (no pressure injury)    Nutrition Order  Nutrition Order: meets nutritional requirements  Nutrition Order Comments: EC7, thin    Anthropometrics  Height: 172.7 cm (5' 8\")    Wt Readings from Last 3 Encounters:   07/12/23 57.9 kg (127 lb 11.2 oz)   07/01/23 64 kg (141 lb)   03/28/23 63.5 kg (140 lb)       Weights (last 7 days)     Date/Time Weight    07/12/23 1036 57.9 kg (127 lb 11.2 oz)    07/11/23 1100 58.9 kg (129 lb 14.4 oz)    07/10/23 1626 59 kg " (130 lb)    07/09/23 0903 59 kg (130 lb 1.6 oz)    07/08/23 0600 59.1 kg (130 lb 6.4 oz)    07/07/23 0600 60.8 kg (134 lb)    07/06/23 0600 61.1 kg (134 lb 9.6 oz)    07/05/23 0600 61.3 kg (135 lb 3.2 oz)               Current Weight  Weight Method: Bed scale  Weight: 57.9 kg (127 lb 11.2 oz)    Ideal Body Weight (IBW)  Ideal Body Weight (IBW) (kg): 64.15  % Ideal Body Weight: 99.22    Usual Body Weight (UBW)  Usual Body Weight: 63.5 kg (140 lb)  Weight Loss: unintentional (12# loss since admission (11 days))  Time Frame: Other (comments) (8% weight loss since admit (if accurate) = severe weight loss)    Body Mass Index (BMI)  BMI (Calculated): 19.4  BMI Assessment: BMI less than 22: patient > 65 years old  Nutritional Status/Malnutrition: Acute illness or Injury - Severe Malnutrition  Based on 8% weight loss X 11 days, PO intake < 50% for greater than 5 days.        Labs/Procedures/Meds  Lab Results Reviewed: reviewed  Lab Results Comments: 7/1: Cl 112H, Glucose 104H, RBC 3.62L, Hgb 11.2L, Hct 33.4L    CMP Results       07/10/23 07/07/23 07/06/23     0514 1205 1453     139 141    K 4.2 4.4 4.5    Cl 105 104 107    CO2 27 26 28    Glucose 90 90 105    BUN 11 9 7    Creatinine 0.8 0.7 0.8    Calcium 8.9 9.4 9.4    Anion Gap 8 9 6    EGFR >60.0 >60.0 >60.0        Lab Results   Component Value Date    ALT 9 07/02/2023    AST 22 07/02/2023    ALKPHOS 51 07/02/2023    BILITOT 0.8 07/02/2023     Lab Results   Component Value Date    PXIHTMCS26 182 07/02/2023     Lab Results   Component Value Date    CALCIUM 8.9 07/10/2023     Lab Results   Component Value Date    WBC 5.12 07/10/2023    HGB 15.0 07/10/2023    HCT 45.0 07/10/2023    MCV 93.8 07/10/2023     07/10/2023     No results found for: IRON, TIBC, FERRITIN  Lab Results   Component Value Date    CHOL 178 06/26/2023    CHOL 163 03/31/2023    CHOL 156 02/04/2022     Lab Results   Component Value Date    HDL 71 06/26/2023    HDL 80 03/31/2023    HDL 71  02/04/2022     Lab Results   Component Value Date    LDLCALC 88 06/26/2023    LDLCALC 66 03/31/2023    LDLCALC 66 02/04/2022     Lab Results   Component Value Date    TRIG 93 06/26/2023    TRIG 87 03/31/2023    TRIG 107 02/04/2022     No results found for: CHOLHDL  Glucose Results       06/26/23     1524    HBG A1C 5.4    EST AVG GLUCOSE 108         Comment for EST AVG GLUCOSE at 1524 on 06/26/23: Estimate of average glucose concentration continuously over 24 hours for previous 2 to 3 months(Per ADA Recommendation).            • apixaban  5 mg oral BID   • cycloSPORINE  1 drop Both Eyes q12h ROBERTO   • lansoprazole  30 mg oral Daily before breakfast   • melatonin  3 mg oral Nightly   • rosuvastatin  20 mg oral Nightly   • trazodone  25 mg oral Nightly           Diagnostic Tests/Procedures  Diagnostic Test/Procedure Reviewed: reviewed  Diagnostic Test/Procedures Comments: echocardiogram on June 27, 2023 reported left ventricle EF 43% and a localized apical aneurysm containing thrombus which most likely caused acute CVA    Medications  Pertinent Medications Reviewed: reviewed  Pertinent Medications Comments: prevacid, crestor         Calorie Requirements  Estimated kCal Needs: Actual Body Weight  Estimated Calorie Need Method: kcal/kg  Calorie/kg Recommended: 25-30  Calorie Recommendations: [id number]      Protein Requirements  Recommended Dosing Weight (Estimated Protein Needs): Actual Body Weight  Est Protein Requirement Amount (gms/kg): 1.2-->1.2 gm protein (1.1 -1.2)  Protein Recommendations: 70-76    Fluid Requirements  Fluid Recommendation (mL): 25-30ml  Recommended Fluid Needs Dosing Weight: Actual Body Weight  Fluid Requirements (mL/day): [id number]      PES  Statement: PES Statement  Nutrition Diagnosis: Inadequate Protein-Energy Intake  Related To:: stroke  Related To:: Decreased ability to consume sufficient energy  As Evidenced By:: PO 0-25%, weight loss  Nutritional Needs Met?: No                                "    Clinical Comments:     Pt seen at bedside, remains aphasic, continues to \"push away\" meal tray.  RD contacted diet office about shakes that were not showing up on trays.  Discussed with tech, that fresh shake will be sent lunch and dinner as  Well as carnation inst breakfast at morning.  Now qualifies for malnutrition diagnosis.  Pt will be going home, friend, Thelma, aware of pt picky appetite and what pt likes.   Hopefully family and friends to cater to food preferences better at home.          Goals:  Take PO to meet > 75% nutrition needs        Date: 07/12/23  Signature: LETA Sousa    "

## 2023-07-12 NOTE — PLAN OF CARE
Problem: Rehabilitation (IRF) Plan of Care  Goal: Plan of Care Review  Outcome: Progressing  Flowsheets (Taken 7/12/2023 1619)  Progress: improving  Plan of Care Reviewed With: patient  Outcome Evaluation: Patient impulsive and agitated. Appetite is poor. 1:1 at the bedside. Continent of bladder this shift.

## 2023-07-12 NOTE — PROGRESS NOTES
Patient: Jennifer Sams  Location: [facility] [address]  MRN: [id number]  Today's date: 7/12/2023    History of Present Illness  Jennifer is a 78 y.o. female admitted on 7/1/2023 with Acute ischemic left MCA stroke (CMS/HCC) [I63.512]. Principal problem is Acute ischemic left MCA stroke (CMS/HCC).    The patient is a 78-year-old  female with a history of AAA, heart block s/p cardiac pacemaker, glaucoma, hyperlipidemia, coronary artery disease, NSTEMI, who presented to [facility] on June 26 after she has not been seen by her friends for a few days.  Patient lives alone and when she was found by EMS she was confused and combative.  In the emergency department she was aphasic with right-sided weakness and hypertensive to the 190 systolic.  CT scan of the head revealed an acute infarct in left MCA with mild bleeding.  Echo showed an EF of 43% and a bubble study was negative.  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  Later this was transitioned to apixaban.  She was continued on statin for secondary prevention.  She was evaluated by speech therapy and cleared for a soft and bite-size diet with moderately thick liquids.    7/2/2023:  Diet SB6 with mildly thick liquids (MT2)  7/6/2023: Upgraded to EC7 with thin liquids    Past Medical History  Jennifer has a past medical history of AAA (abdominal aortic aneurysm), Arthritis, Elevated blood pressure reading without diagnosis of hypertension (4/19/2019), Epistaxis (1/6/2020), GERD (gastroesophageal reflux disease) (4/19/2019), Glaucoma (4/19/2019), Heart murmur, Hiatal hernia, History of hip replacement, total, right (4/19/2019), History of rheumatic fever as a child (4/19/2019), Hypercholesterolemia (4/19/2019), Ischemic cardiomyopathy (5/9/2019), Kidney cysts, Osteoarthritis of multiple joints (4/19/2019), Osteoporosis, Pacemaker (12/9/2019),  Raynaud's disease (4/19/2019), RSD (reflex sympathetic dystrophy) (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        PT Vitals    Date/Time Pulse BP BP Location BP Method Pt Position [facility]   07/12/23 1307 81 128/60 Left upper arm Automatic Sitting [facility]   07/12/23 1339 108 125/91 Left upper arm Automatic Sitting [facility]      PT Pain    Date/Time Pain Type Rating: Rest Rating: Activity [facility]   07/12/23 1307 Pain Assessment 0 - no pain 0 - no pain [facility]   07/12/23 1339 Post Activity 0 - no pain 0 - no pain [facility]          Prior Living Environment    Flowsheet Row Most Recent Value   People in Home alone   Current Living Arrangements home   Home Accessibility --  [2 SH with 1 DARÍO]   Living Environment Comment Per chart review, pt lives alone in a 2SH with 1STE   Number of Stairs, Main Entrance 1   Number of Stairs, Within Home, Primary 12  [Full fight to second floor]          Prior Level of Function    Flowsheet Row Most Recent Value   Dominant Hand --  [Unable to determine 2/2 cognitive/communication deficits]   Ambulation independent   Transferring independent   Toileting independent   Bathing independent   Dressing independent   Eating independent   IADLs independent   Communication understands/communicates without difficulty   Swallowing swallows foods/liquids without difficulty   Baseline Diet/Method of Nutritional Intake no diet restrictions   Past History of Dysphagia no   Prior Level of Function Comment Per chart review: PMH SB6,  MT2,  No VFSS in EMR.  Pt seen by SLP at [place].   Assistive Device Currently Used at Home --  [Per chart review pt has RW at home - unclear if pt was using AD and pt unable to state PLOF at this time]           IRF PT Evaluation and Treatment - 07/12/23 1308        PT Time Calculation    Start Time 1300     Stop Time 1400     Time Calculation (min) 60 min        Session Details    Document Type Daily  Treatment/Progress Note     Mode of Treatment physical therapy;individual therapy        Mobility Belt    Mobility Belt Used for All Out of Bed Activity yes        Sit to Stand Transfer    [place], Sit to Stand Transfer supervision     Safety/Cues impulsivity     Assistive Device none     Comment from mat, bed        Stand to Sit Transfer    [place], Stand to Sit Transfer supervision     Safety/Cues impulsivity     Assistive Device none     Comment to mat, bed        Stand Pivot Transfer    [place], Stand Pivot/Stand Step Transfer supervision     Safety/Cues impulsivity     Assistive Device none     Comment amb approach        Car Transfer    Transfer Technique stand pivot     [place] supervision     Safety/Cues technique     Assistive Device none     Comment amb approach; min cueing for technique        Gait Training    [place], Gait supervision     Safety/Cues impulsivity     Assistive Device none     Distance in Feet 300 feet     Pattern step-through     Deviations/Abnormal Patterns gait speed decreased;step length decreased     Bilateral Gait Deviations heel strike decreased     Comment (Gait/Stairs) 3 gait trials as noted above: >=300'x3 with S for safety        Rough/Uneven Surface Gait Skills    [place] close supervision     Assistive Device none     Distance in Feet 200 feet     Comment outdoor terrain: cl S for safety        Stairs Training    [place], Stairs supervision     Safety/Cues technique     Assistive Device railing     Handrail Location (Stairs) left side (ascending);left side (descending)     Number of Stairs 12     Stair Height 7 inches     Ascending Stairs Technique step-over-step     Descending Stairs Technique step-over-step        Balance    Static Standing Balance mild impairment;unsupported     Dynamic Standing Balance mild impairment;unsupported     Comment, Balance Standing on blue foam at anterior table: S for balance, sorting cards according to suits  (with 100% accuracy).  Standing tolerance 5 mins.        Daily Progress Summary (PT)    Daily Outcome Statement Pt easily agreeable to session today.  Outdoor ambulation assessed as above.  Pt with improved safety with her functional mobility this session with no impulsivity noted.  Pt consistently S t/o.  Plan for family training with pt's POA tomorrow. Pt directly handed off to 1:1 at end of session.                           IRF PT Goals    Flowsheet Row Most Recent Value   Bed Mobility Goal 1    Activity/Assistive Device bed mobility activities, all at 07/02/2023 0830   [place] modified independence at 07/11/2023 0827   Time Frame 1 week, short-term goal (STG) at 07/11/2023 0827   Strategies/Barriers not reassessed at 07/05/2023 0835   Progress/Outcome goal revised this date at 07/11/2023 0827   Bed Mobility Goal 2    Activity/Assistive Device bed mobility activities, all at 07/02/2023 0830   [place] modified independence at 07/02/2023 0830   Time Frame 1 week, short-term goal (STG) at 07/11/2023 0827   Strategies/Barriers STG=LTG at 07/11/2023 0827   Progress/Outcome goal ongoing at 07/11/2023 0827   Transfer Goal 1    Activity/Assistive Device sit-to-stand/stand-to-sit, bed-to-chair/chair-to-bed, stand pivot at 07/02/2023 0830   [place] supervision required at 07/02/2023 0830   Time Frame 3 - 5 days, short-term goal (STG) at 07/11/2023 0827   Strategies/Barriers steadying assist for safety at 07/05/2023 0835   Progress/Outcome goal ongoing at 07/11/2023 0827   Transfer Goal 2    Activity/Assistive Device sit-to-stand/stand-to-sit, bed-to-chair/chair-to-bed, stand pivot at 07/02/2023 0830   [place] modified independence at 07/02/2023 0830   Time Frame 1 week, long-term goal (LTG) at 07/11/2023 0827   Progress/Outcome goal ongoing at 07/11/2023 0827   Gait/Walking Locomotion Goal 1    Activity/Assistive Device gait (walking locomotion) at 07/02/2023 0830   Distance 250 feet at 07/02/2023 0830    [place] supervision required at 07/02/2023 0830   Time Frame 3 - 5 days, short-term goal (STG) at 07/11/2023 0827   Strategies/Barriers cl S at 07/11/2023 0827   Progress/Outcome goal ongoing at 07/11/2023 0827   Gait/Walking Locomotion Goal 2    Activity/Assistive Device gait (walking locomotion) at 07/02/2023 0830   Distance 500 feet at 07/02/2023 0830   [place] supervision required at 07/02/2023 0830   Time Frame 1 week, long-term goal (LTG) at 07/11/2023 0827   Progress/Outcome goal ongoing at 07/11/2023 0827   Wheelchair Locomotion Goal 1    Activity wheelchair mobility skills, all at 07/02/2023 0830   Assistive Device manual, lightweight at 07/02/2023 0830   Distance 50 feet at 07/02/2023 0830   [place] minimum assist (75% or more patient effort) at 07/02/2023 0830   Time Frame 1 week, short-term goal (STG) at 07/05/2023 0835   Strategies/Barriers pt refuses to sit in w/c at 07/11/2023 0827   Progress/Outcome goal no longer appropriate at 07/11/2023 0827   Wheelchair Locomotion Goal 2    Activity wheelchair mobility skills, all at 07/02/2023 0830   Assistive Device manual, lightweight at 07/02/2023 0830   Distance 200 feet at 07/05/2023 0835   [place] modified independence at 07/05/2023 0835   Time Frame 2 weeks, long-term goal (LTG) at 07/05/2023 0835   Progress/Outcome goal no longer appropriate at 07/11/2023 0827   Stairs Goal 1    Activity/Assistive Device stairs, all skills at 07/02/2023 0830   Number of Stairs 16 at 07/02/2023 0830   [place] supervision required at 07/02/2023 0830   Time Frame 3 - 5 days, short-term goal (STG) at 07/11/2023 0827   Strategies/Barriers cl S at 07/11/2023 0827   Progress/Outcome goal ongoing at 07/11/2023 0827   Stairs Goal 2    Activity/Assistive Device stairs, all skills at 07/02/2023 0830   Number of Stairs 24 at 07/02/2023 0830   [place] supervision required at 07/02/2023 0830   Time Frame 1 week, long-term goal (LTG) at 07/11/2023 0827    Progress/Outcome goal ongoing at 07/11/2023 [id number]

## 2023-07-12 NOTE — PROGRESS NOTES
Physical Medicine and Rehabilitation Progress Note          Patient was seen and examined.   Attestation Notes: Face to face encounter completed    Subjective     No acute events overnight.  Patient seen and examined this morning in room.  History limited by cog/communication impairment.      Seen and examined this morning with OT.  More engaged and participatory in therapies.      Review of Systems:  Negative except as per HPI    Objective     Vital signs in last 24 hours:  Temp:  [36.6 °C (97.8 °F)-37.1 °C (98.7 °F)] 36.6 °C (97.8 °F)  Heart Rate:  [] 79  Resp:  [17-18] 18  BP: (110-160)/(57-76) 144/65    Physical Exam      General Appearance:        Alert, cooperative, no distress   Head:    Normocephalic,atraumatic   Eyes:    Conjunctiva clear      Lungs:     Clear to auscultation bilaterally, respirations unlabored   Heart:    Regular rate and rhythm, no murmur   Abdomen:     Soft, non-tender, bowel sounds active   Extremities:     Extremities normal, atraumatic, no cyanosis or edema           Skin:   No rashes or lesions   Neurologic:          Behavior/  Emotional:   Awake, alert, aphasic, verbal output limited to incoherent sounds.  She is able to follow simple commands with increased time.  R wrist and hand weakness, wearing splint      Appropriate, cooperative                  Results from last 7 days   Lab Units 07/10/23  0514   WBC K/uL 5.12   RBC M/uL 4.80   HEMOGLOBIN g/dL 15.0   HEMATOCRIT % 45.0   PLATELETS K/uL 204   MCV fL 93.8   RDW % 13.2   EOS ABS AUTO K/uL 0.11       Results from last 7 days   Lab Units 07/10/23  0514 07/07/23  1205 07/06/23  1453   SODIUM mEQ/L 140 139 141   POTASSIUM mEQ/L 4.2 4.4 4.5   CHLORIDE mEQ/L 105 104 107   CO2 mEQ/L 27 26 28   BUN mg/dL 11 9 7   CREATININE mg/dL 0.8 0.7 0.8   EGFR mL/min/1.73m*2 >60.0 >60.0 >60.0   ANION GAP mEQ/L 8 9 6       Current Facility-Administered Medications   Medication Dose Route Frequency   • acetaminophen  650 mg oral q6h PRN   •  apixaban  5 mg oral BID   • cycloSPORINE  1 drop Both Eyes q12h ROBERTO   • lansoprazole  30 mg oral Daily before breakfast   • melatonin  3 mg oral Nightly   • rosuvastatin  20 mg oral Nightly   • senna  1 tablet oral 2x daily PRN   • trazodone  25 mg oral Nightly       Plan of care was discussed with patient                    * Acute ischemic left MCA stroke (CMS/HCC)  Assessment & Plan  The patient is a 78-year-old  female with a history of AAA, heart block s/p cardiac pacemaker, glaucoma, hyperlipidemia, coronary artery disease, NSTEMI, who presented to [facility] on June 26 after she has not been seen by her friends for a few days.   In the emergency department she was aphasic with right-sided weakness and hypertensive to the 190 systolic.  CT scan of the head revealed an acute infarct in left MCA with mild bleeding.  Echo showed an EF of 43% and a bubble study was negative.  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  Later this was transitioned to apixaban.        -Full program PT/OT/SLP/rehabilitation RN/psychology. Consult internal medicine for comorbidities.  -Continue apixaban  -Continue statin  -Sleep - melatonin and trazodone  -Safety - one-to-one               Right wrist drop  Assessment & Plan  Splinting, contracture prevention    Left ventricular apical thrombus  Assessment & Plan  Continue apixaban    Coronary artery disease involving native heart without angina pectoris  Assessment & Plan  Continue apixaban    Glaucoma  Assessment & Plan  Continue eye drops    Follow-up exam  Assessment & Plan  Dispo: 7/14 home with assist, home with HC before transition to OP      PCP  Cardiology Dr. Lorenzo for her cardiac function and anticoagulation treatment  Neurology   PCP

## 2023-07-13 ENCOUNTER — APPOINTMENT (INPATIENT)
Dept: PHYSICAL THERAPY | Facility: REHABILITATION | Age: 78
DRG: 057 | End: 2023-07-13
Payer: MEDICARE

## 2023-07-13 ENCOUNTER — APPOINTMENT (INPATIENT)
Dept: SPEECH THERAPY | Facility: REHABILITATION | Age: 78
DRG: 057 | End: 2023-07-13
Payer: MEDICARE

## 2023-07-13 ENCOUNTER — APPOINTMENT (INPATIENT)
Dept: OCCUPATIONAL THERAPY | Facility: REHABILITATION | Age: 78
DRG: 057 | End: 2023-07-13
Payer: MEDICARE

## 2023-07-13 PROCEDURE — 97535 SELF CARE MNGMENT TRAINING: CPT | Mod: GO

## 2023-07-13 PROCEDURE — 97530 THERAPEUTIC ACTIVITIES: CPT | Mod: GO,59

## 2023-07-13 PROCEDURE — 63700000 HC SELF-ADMINISTRABLE DRUG: Performed by: HOSPITALIST

## 2023-07-13 PROCEDURE — 12800000 HC ROOM AND CARE SEMIPRIVATE REHAB

## 2023-07-13 PROCEDURE — 63700000 HC SELF-ADMINISTRABLE DRUG: Performed by: STUDENT IN AN ORGANIZED HEALTH CARE EDUCATION/TRAINING PROGRAM

## 2023-07-13 PROCEDURE — 92507 TX SP LANG VOICE COMM INDIV: CPT | Mod: GN

## 2023-07-13 PROCEDURE — 97530 THERAPEUTIC ACTIVITIES: CPT | Mod: GP,59

## 2023-07-13 PROCEDURE — 92526 ORAL FUNCTION THERAPY: CPT | Mod: GN

## 2023-07-13 RX ORDER — CYCLOSPORINE 0.5 MG/ML
1 EMULSION OPHTHALMIC EVERY 12 HOURS
Qty: 3 EACH | Refills: 0 | Status: SHIPPED | OUTPATIENT
Start: 2023-07-13 | End: 2024-02-12

## 2023-07-13 RX ORDER — TRAZODONE HYDROCHLORIDE 50 MG/1
25 TABLET ORAL NIGHTLY
Qty: 15 TABLET | Refills: 0 | Status: SHIPPED | OUTPATIENT
Start: 2023-07-13 | End: 2024-03-14 | Stop reason: ALTCHOICE

## 2023-07-13 RX ORDER — ROSUVASTATIN CALCIUM 20 MG/1
20 TABLET, COATED ORAL NIGHTLY
Qty: 30 TABLET | Refills: 0 | Status: SHIPPED | OUTPATIENT
Start: 2023-07-13 | End: 2023-09-15 | Stop reason: SDUPTHER

## 2023-07-13 RX ADMIN — CYCLOSPORINE 1 DROP: 0.5 EMULSION OPHTHALMIC at 20:08

## 2023-07-13 RX ADMIN — CYCLOSPORINE 1 DROP: 0.5 EMULSION OPHTHALMIC at 08:08

## 2023-07-13 RX ADMIN — ROSUVASTATIN CALCIUM 20 MG: 20 TABLET, FILM COATED ORAL at 20:09

## 2023-07-13 RX ADMIN — Medication 3 MG: at 20:08

## 2023-07-13 RX ADMIN — TRAZODONE HYDROCHLORIDE 25 MG: 50 TABLET, FILM COATED ORAL at 20:08

## 2023-07-13 RX ADMIN — APIXABAN 5 MG: 5 TABLET, FILM COATED ORAL at 08:12

## 2023-07-13 RX ADMIN — LANSOPRAZOLE 30 MG: KIT at 08:08

## 2023-07-13 RX ADMIN — APIXABAN 5 MG: 5 TABLET, FILM COATED ORAL at 20:14

## 2023-07-13 ASSESSMENT — PATIENT HEALTH QUESTIONNAIRE - PHQ9: SUM OF ALL RESPONSES TO PHQ9 QUESTIONS 1 & 2: 1

## 2023-07-13 NOTE — PROGRESS NOTES
Patient: Jennifer Sams  Location: [facility] [address]  MRN: [id number]  Today's date: 7/13/2023    History of Present Illness  Jennifer is a 78 y.o. female admitted on 7/1/2023 with Acute ischemic left MCA stroke (CMS/HCC) [I63.512]. Principal problem is Acute ischemic left MCA stroke (CMS/HCC).    The patient is a 78-year-old  female with a history of AAA, heart block s/p cardiac pacemaker, glaucoma, hyperlipidemia, coronary artery disease, NSTEMI, who presented to [facility] on June 26 after she has not been seen by her friends for a few days.  Patient lives alone and when she was found by EMS she was confused and combative.  In the emergency department she was aphasic with right-sided weakness and hypertensive to the 190 systolic.  CT scan of the head revealed an acute infarct in left MCA with mild bleeding.  Echo showed an EF of 43% and a bubble study was negative.  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  Later this was transitioned to apixaban.  She was continued on statin for secondary prevention.  She was evaluated by speech therapy and cleared for a soft and bite-size diet with moderately thick liquids.    7/2/2023:  Diet SB6 with mildly thick liquids (MT2)  7/6/2023: Upgraded to EC7 with thin liquids    Past Medical History  Jennifer has a past medical history of AAA (abdominal aortic aneurysm), Arthritis, Elevated blood pressure reading without diagnosis of hypertension (4/19/2019), Epistaxis (1/6/2020), GERD (gastroesophageal reflux disease) (4/19/2019), Glaucoma (4/19/2019), Heart murmur, Hiatal hernia, History of hip replacement, total, right (4/19/2019), History of rheumatic fever as a child (4/19/2019), Hypercholesterolemia (4/19/2019), Ischemic cardiomyopathy (5/9/2019), Kidney cysts, Osteoarthritis of multiple joints (4/19/2019), Osteoporosis, Pacemaker (12/9/2019),  Raynaud's disease (4/19/2019), RSD (reflex sympathetic dystrophy) (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        PT Vitals    Date/Time Pulse BP BP Location BP Method Pt Position [facility]   07/13/23 1342 85 112/71 Left upper arm Automatic Sitting LBR      PT Pain    Date/Time Pain Type Rating: Rest Rating: Activity [facility]   07/13/23 1342 Pain Assessment 0 - no pain 0 - no pain LBR   07/13/23 1355 Post Activity 0 - no pain 0 - no pain LBR          Prior Living Environment    Flowsheet Row Most Recent Value   People in Home alone   Current Living Arrangements home   Home Accessibility --  [2 SH with 1 DARÍO]   Living Environment Comment Per chart review, pt lives alone in a 2SH with 1STE   Number of Stairs, Main Entrance 1   Number of Stairs, Within Home, Primary 12  [Full fight to second floor]          Prior Level of Function    Flowsheet Row Most Recent Value   Dominant Hand --  [Unable to determine 2/2 cognitive/communication deficits]   Ambulation independent   Transferring independent   Toileting independent   Bathing independent   Dressing independent   Eating independent   IADLs independent   Communication understands/communicates without difficulty   Swallowing swallows foods/liquids without difficulty   Baseline Diet/Method of Nutritional Intake no diet restrictions   Past History of Dysphagia no   Prior Level of Function Comment Per chart review: PMH SB6,  MT2,  No VFSS in EMR.  Pt seen by SLP at [place].   Assistive Device Currently Used at Home --  [Per chart review pt has RW at home - unclear if pt was using AD and pt unable to state PLOF at this time]           IRF PT Evaluation and Treatment - 07/13/23 1343        PT Time Calculation    Start Time 1330     Stop Time 1400     Time Calculation (min) 30 min        Session Details    Document Type Discharge Evaluation     Mode of Treatment physical therapy;individual therapy         Mobility Belt    Mobility Belt Used for All Out of Bed Activity yes        Cognition/Psychosocial    Comment, Cognition expressive aphasia, apraxia, impulsivity.  Makes needs known via Y/N's and gesturing.        Sensory Assessment (Somatosensory)    Left LE Sensory Assessment unable/difficult to assess   due to pt's cognitive/communication deficits    Right LE Sensory Assessment unable/difficult to assess   due to pt's cognitive/communication deficits       Range of Motion (ROM)    Left Lower Extremity (ROM) left LE ROM is WFL     Right Lower Extremity (ROM) right LE ROM is WFL        Strength (Manual Muscle Testing)    Left Lower Extremity Strength left LE strength is WFL     Right Lower Extremity Strength right LE strength is WFL        Bed Mobility    [place], Roll Left modified independence     [place], Roll Right modified independence     [place], Supine to Sit modified independence     [place], Sit to Supine modified independence        Chair to Bed Transfer    [place], Chair to Bed supervision     Safety/Cues impulsivity     Assistive Device none     Comment SPT; S for safety due to impulsivity and impaired balance        Sit to Stand Transfer    [place], Sit to Stand Transfer modified independence     Safety/Cues impulsivity     Assistive Device none     Comment no LOB        Stand to Sit Transfer    [place], Stand to Sit Transfer modified independence     Safety/Cues impulsivity     Assistive Device none     Comment no LOB        Stand Pivot Transfer    [place], Stand Pivot/Stand Step Transfer supervision     Safety/Cues impulsivity     Assistive Device none     Comment amb approach, S due to impaired balance, safety        Gait Training    [place], Gait supervision     Safety/Cues impulsivity     Assistive Device none     Distance in Feet 300 feet     Pattern step-through     Deviations/Abnormal Patterns gait speed decreased;step length decreased      Bilateral Gait Deviations heel strike decreased     Comment (Gait/Stairs) 1 gait trial as noted above: S for safety due to impaired balance, impulsivity        Curb Negotiation    [place] close supervision     Assistive Device none     Curb Height 6 inches     Comment min cueing for safety        Sloped Surface Gait Skills    [place] close supervision     Assistive Device none     Distance in Feet 5 feet     Comment indoor ramp; min cueing for safety        Stairs Training    Comment see am note        Wheelchair Mobility/Management    Comment, Wheelchair Mobility w/c discharged.  not a goal.  pt does not sit in a w/c        Impairments/Safety Issues    Functional Endurance fair +        Balance    Static Sitting Balance WFL     Dynamic Sitting Balance WFL     Static Standing Balance WFL     Dynamic Standing Balance unsupported;mild impairment        Motor Skills    Coordination gross motor deficit;right;lower extremity;minimal impairment     Muscle Tone WNL;bilateral;lower extremity(s)        Postural Deviations    Postural Deviations head and neck;shoulder;upper back     Head and Neck forward head     Upper Back kyphosis        Lower Extremity (Therapeutic Exercise)    Comment Recommend f/u in homecare PT for HEP        Discharge Summary (PT)    Outcomes Achieved/Progress Made Upon Discharge (PT) goals partially achieved prior to discharge;progress was made toward goals     Transfer to Another Level of Care or Facility (PT) recommend continued therapy following discharge;recommend therapy via home health     Discharge Summary Statement (PT) Pt made slow steady progress in therapy progressing to a S  level with all her upright mobility without an AD.  Pt's MCGRATH was reassessed at 37/56 with pt remaining at increased risk of falls.  Additionally, the pt was limited by impaired cognition/communication with poor safety awareness, impaired judgment and +impulsivity. Pt required max encouragement for  participation and frequently endorsed increased fatigue.  Pt requires S at d/c due to her impaired cognition and decreased balance.  Education was completed with pt's POA who verbalized competency with pt's S needs.  Pt would benefit from ongoing homecare PT, with transition to outpatient as able, to maximize her functional I and safety.                           IRF PT Goals    Flowsheet Row Most Recent Value   Bed Mobility Goal 1    Activity/Assistive Device bed mobility activities, all at 07/02/2023 0830   [place] modified independence at 07/11/2023 0827   Time Frame 1 week, short-term goal (STG) at 07/11/2023 0827   Strategies/Barriers not reassessed at 07/05/2023 0835   Progress/Outcome goal met at 07/13/2023 1343   Bed Mobility Goal 2    Activity/Assistive Device bed mobility activities, all at 07/02/2023 0830   [place] modified independence at 07/02/2023 0830   Time Frame 1 week, short-term goal (STG) at 07/11/2023 0827   Strategies/Barriers STG=LTG at 07/11/2023 0827   Progress/Outcome goal met at 07/13/2023 1343   Transfer Goal 1    Activity/Assistive Device sit-to-stand/stand-to-sit, bed-to-chair/chair-to-bed, stand pivot at 07/02/2023 0830   [place] supervision required at 07/02/2023 0830   Time Frame 3 - 5 days, short-term goal (STG) at 07/11/2023 0827   Strategies/Barriers S for sit-stand,  mod I SPT at 07/13/2023 1343   Progress/Outcome goal partially met at 07/13/2023 1343   Transfer Goal 2    Activity/Assistive Device sit-to-stand/stand-to-sit, bed-to-chair/chair-to-bed, stand pivot at 07/02/2023 0830   [place] modified independence at 07/02/2023 0830   Time Frame 1 week, long-term goal (LTG) at 07/11/2023 0827   Strategies/Barriers S for safety at 07/13/2023 1343   Progress/Outcome goal not met at 07/13/2023 1343   Gait/Walking Locomotion Goal 1    Activity/Assistive Device gait (walking locomotion) at 07/02/2023 0830   Distance 250 feet at 07/02/2023 0830   [place]  supervision required at 07/02/2023 0830   Time Frame 3 - 5 days, short-term goal (STG) at 07/11/2023 0827   Strategies/Barriers cl S at 07/11/2023 0827   Progress/Outcome goal met at 07/13/2023 1343   Gait/Walking Locomotion Goal 2    Activity/Assistive Device gait (walking locomotion) at 07/02/2023 0830   Distance 500 feet at 07/02/2023 0830   [place] supervision required at 07/02/2023 0830   Time Frame 1 week, long-term goal (LTG) at 07/11/2023 0827   Progress/Outcome goal met at 07/13/2023 1343   Wheelchair Locomotion Goal 1    Activity wheelchair mobility skills, all at 07/02/2023 0830   Assistive Device manual, lightweight at 07/02/2023 0830   Distance 50 feet at 07/02/2023 0830   [place] minimum assist (75% or more patient effort) at 07/02/2023 0830   Time Frame 1 week, short-term goal (STG) at 07/05/2023 0835   Strategies/Barriers pt refuses to sit in w/c at 07/11/2023 0827   Progress/Outcome goal no longer appropriate at 07/13/2023 1343   Wheelchair Locomotion Goal 2    Activity wheelchair mobility skills, all at 07/02/2023 0830   Assistive Device manual, lightweight at 07/02/2023 0830   Distance 200 feet at 07/05/2023 0835   [place] modified independence at 07/05/2023 0835   Time Frame 2 weeks, long-term goal (LTG) at 07/05/2023 0835   Progress/Outcome goal no longer appropriate at 07/13/2023 1343   Stairs Goal 1    Activity/Assistive Device stairs, all skills at 07/02/2023 0830   Number of Stairs 16 at 07/02/2023 0830   [place] supervision required at 07/02/2023 0830   Time Frame 3 - 5 days, short-term goal (STG) at 07/11/2023 0827   Strategies/Barriers cl S at 07/11/2023 0827   Progress/Outcome goal met at 07/13/2023 1343   Stairs Goal 2    Activity/Assistive Device stairs, all skills at 07/02/2023 0830   Number of Stairs 24 at 07/02/2023 0830   [place] supervision required at 07/02/2023 0830   Time Frame 1 week, long-term goal (LTG) at 07/11/2023 0827   Strategies/Barriers 16  only at 07/13/2023 1343   Progress/Outcome goal not met at 07/13/2023 1349

## 2023-07-13 NOTE — PROGRESS NOTES
[facility] Internal Medicine Progress Note          Patient was seen and examined.     Ms Sams is a 78-year-old female with ischemic cardiomyopathy, heart block s/p PPM, CAD, AAA, hypertension, GERD who was admitted to [facility] 6/26 with altered mental status.  She was noted to be aphasic with right-sided weakness  Initial CT scan concerning for acute infarct in the posterior aspect of the left MCA territory with associated thrombosed vessel  Follow-up CT head revealed acute/subacute infarct in the left MCA with 3 mm midline shift, no definite hemorrhage  She was started on aspirin and heparin drip  Noted dysphagia, speech therapy recommends soft and bite-size diet with mildly thick liquids  Echocardiogram 6/27 reported LVEF 43%, localized apical aneurysm containing thrombus which most likely caused her acute CVA.  She was transitioned to Eliquis, her aspirin was discontinued  She continues on Crestor for dyslipidemia  She is now referred to [facility] for acute inpatient rehabilitation      Subjective      Patient without nursing issues overnight.  Slept well per nursing.  Remains animated and interactive with therapies.  Continues with expressive aphasia.  Patient's friend visiting today.  Patient remains eager to return home.  Blood pressures are well controlled.  Medications have been reconciled and electronically prescribed to [facility] pharmacy.  Patient remains interactive with therapies and continues to progress.   Will follow-up as outpatient.      Objective     Vital signs in last 24 hours:  Temp:  [36.6 °C (97.9 °F)-36.8 °C (98.2 °F)] 36.8 °C (98.2 °F)  Heart Rate:  [63-85] 68  Resp:  [16] 16  BP: (112-134)/(57-82) 112/57     Allergies   Allergen Reactions   • Ace Inhibitors Other (see comments)     cough   • Atorvastatin      Nausea and can'trecall   • Brilinta [Ticagrelor]      SOB   • Codeine      Nausea and Vomiting   • Dilaudid [Hydromorphone]      Nausea & vomiting   •  Morphine Sulfate Nausea Only and GI intolerance   • Onion      Severe abdominal pain   • Oxycodone      Nausea & vomiting, dizziness     Medications:      • apixaban  5 mg oral BID   • cycloSPORINE  1 drop Both Eyes q12h ROBERTO   • lansoprazole  30 mg oral Daily before breakfast   • melatonin  3 mg oral Nightly   • rosuvastatin  20 mg oral Nightly   • trazodone  25 mg oral Nightly         Objective      Full Code    Physical Exam  HEENT: PERRLA, EOMI, sclera anicteric, mucous membranes moist  Neck: Supple, JVP less than 5 cm  Heart: Regular rhythm, no murmurs, rubs or gallops  Lungs: Clear to auscultation  Abdomen: Normal bowel sounds, soft, nontender, no rebound or guarding  Extremities: No clubbing, cyanosis or edema  Neuro: Patient bright and alert, expressive aphasia, following commands with improved right-sided weakness    Lab Results   Component Value Date    GLUCOSE 90 07/10/2023    CALCIUM 8.9 07/10/2023     07/10/2023    K 4.2 07/10/2023    CO2 27 07/10/2023     07/10/2023    BUN 11 07/10/2023    CREATININE 0.8 07/10/2023       Lab Results   Component Value Date    WBC 5.12 07/10/2023    HGB 15.0 07/10/2023    HCT 45.0 07/10/2023    MCV 93.8 07/10/2023     07/10/2023       Chest x-ray 6/26:  Impression:  No active disease in the chest     CT head stroke alert 6/26:  IMPRESSION:   1. Findings highly concerning for an acute infarct in the posterior aspect of the left MCA territory with an associated thrombosed vessel.   2. Chronic microangiopathy and involutional changes.     CTA head/neck 6/26, CT brain perfusion:  IMPRESSION:   1. Atherosclerosis at the carotid bifurcations with mild luminal narrowing measuring approximately 25% on the right and 20% on the left using NASCET criteria. Patent cervical carotid and vertebral arteries.   2. Termination of flow in an M3 branch of the left middle cerebral artery.   3. Intracranial atherosclerosis with significant narrowing involving the left V4  segment after the takeoff of the posterior inferior cerebellar artery. Mild irregularity of the basilar artery.   4. Completed infarct in the posterior aspect of the left MCA territory. No evidence of ischemic penumbra. rCBF <30% : 0 cc TMax > 6s: 2 cc Mismatch volume: 2 cc Mismatch Ratio: NA   5. Additional chronic and incidental findings, as above. In order to accelerate response time, other vascular pathology including occlusions of more distal arteries are not completely evaluated on this examination.     CT head 6/26:  IMPRESSION:   Evolving left MCA territory infarct.  Small hyperdense foci within the infarct may reflect contrast staining from interval angiography versus punctate hemorrhage.  Short interval follow-up recommended.     CT head 6/27:  IMPRESSION:   Acute/subacute left MCA distribution infarct with 3 mm midline shift.. No definite hemorrhage    Transthoracic echo 6/27:  Left ventricle with normal dimensions and regional contraction abnormalities consistent with CAD in the LAD distribution: Localized apical infarction with aneurysm formation.  Paradoxical septal movement is consistent with ventricular pacing.  There is moderate left ventricular systolic and mild diastolic dysfunction.  LVEF 43% Left ventricular apical thrombus Left atrial pressure 14 mmHg There is a visual suggestion of mild aortic stenosis. (Doppler assessment of the aortic valve is suboptimal) Mild mitral regurgitation Normal right ventricle dimensions and systolic function: TAPSE 1.84 cm Mild tricuspid regurgitation PASP 34 mmHg Normal left atrial volume index No pericardial effusion or vegetations AS-V pacing Technically difficult study: No parasternal imaging obtained This study was performed with Definity contrast to optimize evaluation of regional and global left ventricular function No significant change from 3/28/2023 except for current demonstration of left ventricular apical thrombus    Assessment     Neuro:  Left MCA  CVA  -Initial CT with concern for possible bleed, repeat scan without hemorrhage but 3 mm midline shift  -Continue Eliquis, statin, blood pressure management for secondary prevention  -Consult neurology     Cardiovascular:  Echo 6/27 showing LVEF 43%, localized apical aneurysm containing thrombus, possible mild AAS, mild MR, normal RV, PASP 34  -Has been started on Eliquis, aspirin discontinued  Dysrhythmia, history of heart block, s/p [employer] pacemaker  -Follows with Dr. Carolina as outpatient  CAD  -NSTEMI 4/26/19, 3vCAD, s/p FRANK x2 LAD, FRANK x4 RCA, FRANK x1LCx OM2  Ischemic cardiomyopathy  -Previously recommended Entresto, but was cost prohibitive; valsartan also recommended but not started due to concern for dizziness     Pulmonary:  -Increased risk of aspiration due to dysphagia  -Maintain aspiration precautions  -Encourage use of incentive spirometry for prevention of atelectasis     Endocrine:  Dyslipidemia  -Continue Crestor, low-fat diet     Renal:  BUN/Cr 12/0.7 with GFR> 60  -Continue to monitor renal function, volume status     GI:  Dysphagia  -Advanced to regular diet    KELLI/GI prophylaxis  -continue PPI therapy  Constipation  -Bowel program available as needed     :  Increased risk of urinary retention, UTI due to CVA  -Monitor for symptom complaint     Heme:  Anemia, normochromic, normocytic with normal RDW  -Monitor trend hemoglobin on apixaban  Thrombocytopenia  -Continue to monitor trend platelet count     Prophylaxis:  -Apixaban DVT prophylaxis  -PPI GI prophylaxis  -Spirometry for atelectasis  -Maintain fall, cardiac, aspiration precautions        Darrel Ramey MD  7/13/2023

## 2023-07-13 NOTE — PROGRESS NOTES
Patient: Jennifer Sams  Location: [facility] [address]  MRN: [id number]  Today's date: 7/13/2023    History of Present Illness  Jennifer is a 78 y.o. female admitted on 7/1/2023 with Acute ischemic left MCA stroke (CMS/HCC) [I63.512]. Principal problem is Acute ischemic left MCA stroke (CMS/HCC).    The patient is a 78-year-old  female with a history of AAA, heart block s/p cardiac pacemaker, glaucoma, hyperlipidemia, coronary artery disease, NSTEMI, who presented to [facility] on June 26 after she has not been seen by her friends for a few days.  Patient lives alone and when she was found by EMS she was confused and combative.  In the emergency department she was aphasic with right-sided weakness and hypertensive to the 190 systolic.  CT scan of the head revealed an acute infarct in left MCA with mild bleeding.  Echo showed an EF of 43% and a bubble study was negative.  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  Later this was transitioned to apixaban.  She was continued on statin for secondary prevention.  She was evaluated by speech therapy and cleared for a soft and bite-size diet with moderately thick liquids.    7/2/2023:  Diet SB6 with mildly thick liquids (MT2)  7/6/2023: Upgraded to EC7 with thin liquids    Past Medical History  Jennifer has a past medical history of AAA (abdominal aortic aneurysm), Arthritis, Elevated blood pressure reading without diagnosis of hypertension (4/19/2019), Epistaxis (1/6/2020), GERD (gastroesophageal reflux disease) (4/19/2019), Glaucoma (4/19/2019), Heart murmur, Hiatal hernia, History of hip replacement, total, right (4/19/2019), History of rheumatic fever as a child (4/19/2019), Hypercholesterolemia (4/19/2019), Ischemic cardiomyopathy (5/9/2019), Kidney cysts, Osteoarthritis of multiple joints (4/19/2019), Osteoporosis, Pacemaker (12/9/2019),  Raynaud's disease (4/19/2019), RSD (reflex sympathetic dystrophy) (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        PT Vitals    Date/Time Pulse BP BP Location BP Method Pt Position [facility]   07/13/23 1042 70 127/64 Left upper arm Automatic Sitting LBR      PT Pain    Date/Time Pain Type Rating: Rest Rating: Activity [facility]   07/13/23 1042 Pain Assessment 0 - no pain 0 - no pain LBR   07/13/23 1055 Post Activity 0 - no pain 0 - no pain LBR          Prior Living Environment    Flowsheet Row Most Recent Value   People in Home alone   Current Living Arrangements home   Home Accessibility --  [2 SH with 1 DARÍO]   Living Environment Comment Per chart review, pt lives alone in a 2SH with 1STE   Number of Stairs, Main Entrance 1   Number of Stairs, Within Home, Primary 12  [Full fight to second floor]          Prior Level of Function    Flowsheet Row Most Recent Value   Dominant Hand --  [Unable to determine 2/2 cognitive/communication deficits]   Ambulation independent   Transferring independent   Toileting independent   Bathing independent   Dressing independent   Eating independent   IADLs independent   Communication understands/communicates without difficulty   Swallowing swallows foods/liquids without difficulty   Baseline Diet/Method of Nutritional Intake no diet restrictions   Past History of Dysphagia no   Prior Level of Function Comment Per chart review: PMH SB6,  MT2,  No VFSS in EMR.  Pt seen by SLP at [place].   Assistive Device Currently Used at Home --  [Per chart review pt has RW at home - unclear if pt was using AD and pt unable to state PLOF at this time]           IRF PT Evaluation and Treatment - 07/13/23 1043        PT Time Calculation    Start Time 1030     Stop Time 1100     Time Calculation (min) 30 min        Session Details    Document Type Daily Treatment/Progress Note     Mode of Treatment physical therapy;individual  therapy        General Information    Patient/Family/Caregiver Comments/Observations Pt's Friend/POA Thelma present for education        Mobility Belt    Mobility Belt Used for All Out of Bed Activity yes   instructed Thelma to use mobility belt upon d/c.       Sit to Stand Transfer    [place], Sit to Stand Transfer supervision     Safety/Cues impulsivity     Assistive Device none        Stand to Sit Transfer    [place], Stand to Sit Transfer supervision     Safety/Cues impulsivity     Assistive Device none        Stand Pivot Transfer    [place], Stand Pivot/Stand Step Transfer supervision     Safety/Cues impulsivity     Assistive Device none     Comment amb approach        Car Transfer    Comment see note dated 7/12        Gait Training    [place], Gait supervision     Safety/Cues impulsivity     Assistive Device none     Distance in Feet 300 feet     Pattern step-through     Deviations/Abnormal Patterns gait speed decreased     Bilateral Gait Deviations heel strike decreased     [place], Picking Up Object close supervision     Comment (Gait/Stairs) 1 gait trial as noted above with pt's friend observing.  S for safety.        Stairs Training    [place], Stairs supervision     Safety/Cues technique     Assistive Device railing     Handrail Location (Stairs) left side (descending);left side (ascending)     Number of Stairs 16     Stair Height 7 inches     Ascending Stairs Technique step-over-step     Descending Stairs Technique step-over-step     Comment pt's friend observed.        Wheelchair Mobility/Management    Comment, Wheelchair Mobility w/c discharged.        Daily Progress Summary (PT)    Daily Outcome Statement Pt's friend Thelma present for education.  Reviewed pt's current level of functioning.  Reinforced pt's need for cl S/S  for safety.  Discussed pt's increased falls risk and frequent impulsivity which impacts the pt's safe functional mobility.  Thelma was engaged t/o session and  verbalized understanding with pt's care needs.  No further education required.                      Education Documentation  Additional Training/Education, taught by Melba Hill, PT at 7/13/2023 12:16 PM.  Learner: Caregiver  Readiness: Acceptance  Method: Explanation  Response: Verbalizes Understanding  Comment: Pt's friend Thelma present for education. Thelma observed functionals as noted above.  Pt's friend with good understanding of pt's care needs, including the safety concerns.  Thelma without questions re: d/c to home tomorrow.          IRF PT Goals    Flowsheet Row Most Recent Value   Bed Mobility Goal 1    Activity/Assistive Device bed mobility activities, all at 07/02/2023 0830   [place] modified independence at 07/11/2023 0827   Time Frame 1 week, short-term goal (STG) at 07/11/2023 0827   Strategies/Barriers not reassessed at 07/05/2023 0835   Progress/Outcome goal revised this date at 07/11/2023 0827   Bed Mobility Goal 2    Activity/Assistive Device bed mobility activities, all at 07/02/2023 0830   [place] modified independence at 07/02/2023 0830   Time Frame 1 week, short-term goal (STG) at 07/11/2023 0827   Strategies/Barriers STG=LTG at 07/11/2023 0827   Progress/Outcome goal ongoing at 07/11/2023 0827   Transfer Goal 1    Activity/Assistive Device sit-to-stand/stand-to-sit, bed-to-chair/chair-to-bed, stand pivot at 07/02/2023 0830   [place] supervision required at 07/02/2023 0830   Time Frame 3 - 5 days, short-term goal (STG) at 07/11/2023 0827   Strategies/Barriers steadying assist for safety at 07/05/2023 0835   Progress/Outcome goal ongoing at 07/11/2023 0827   Transfer Goal 2    Activity/Assistive Device sit-to-stand/stand-to-sit, bed-to-chair/chair-to-bed, stand pivot at 07/02/2023 0830   [place] modified independence at 07/02/2023 0830   Time Frame 1 week, long-term goal (LTG) at 07/11/2023 0827   Progress/Outcome goal ongoing at 07/11/2023 0827   Gait/Walking Locomotion  Goal 1    Activity/Assistive Device gait (walking locomotion) at 07/02/2023 0830   Distance 250 feet at 07/02/2023 0830   [place] supervision required at 07/02/2023 0830   Time Frame 3 - 5 days, short-term goal (STG) at 07/11/2023 0827   Strategies/Barriers cl S at 07/11/2023 0827   Progress/Outcome goal ongoing at 07/11/2023 0827   Gait/Walking Locomotion Goal 2    Activity/Assistive Device gait (walking locomotion) at 07/02/2023 0830   Distance 500 feet at 07/02/2023 0830   [place] supervision required at 07/02/2023 0830   Time Frame 1 week, long-term goal (LTG) at 07/11/2023 0827   Progress/Outcome goal ongoing at 07/11/2023 0827   Wheelchair Locomotion Goal 1    Activity wheelchair mobility skills, all at 07/02/2023 0830   Assistive Device manual, lightweight at 07/02/2023 0830   Distance 50 feet at 07/02/2023 0830   [place] minimum assist (75% or more patient effort) at 07/02/2023 0830   Time Frame 1 week, short-term goal (STG) at 07/05/2023 0835   Strategies/Barriers pt refuses to sit in w/c at 07/11/2023 0827   Progress/Outcome goal no longer appropriate at 07/11/2023 0827   Wheelchair Locomotion Goal 2    Activity wheelchair mobility skills, all at 07/02/2023 0830   Assistive Device manual, lightweight at 07/02/2023 0830   Distance 200 feet at 07/05/2023 0835   [place] modified independence at 07/05/2023 0835   Time Frame 2 weeks, long-term goal (LTG) at 07/05/2023 0835   Progress/Outcome goal no longer appropriate at 07/11/2023 0827   Stairs Goal 1    Activity/Assistive Device stairs, all skills at 07/02/2023 0830   Number of Stairs 16 at 07/02/2023 0830   [place] supervision required at 07/02/2023 0830   Time Frame 3 - 5 days, short-term goal (STG) at 07/11/2023 0827   Strategies/Barriers cl S at 07/11/2023 0827   Progress/Outcome goal ongoing at 07/11/2023 0827   Stairs Goal 2    Activity/Assistive Device stairs, all skills at 07/02/2023 0830   Number of Stairs 24 at 07/02/2023  0830   [place] supervision required at 07/02/2023 0830   Time Frame 1 week, long-term goal (LTG) at 07/11/2023 0827   Progress/Outcome goal ongoing at 07/11/2023 0832

## 2023-07-13 NOTE — PROGRESS NOTES
Patient: Jennifer Sams  Location: [facility] [address]  MRN: [id number]  Today's date: 7/13/2023    History of Present Illness  Jennifer is a 78 y.o. female admitted on 7/1/2023 with Acute ischemic left MCA stroke (CMS/HCC) [I63.512]. Principal problem is Acute ischemic left MCA stroke (CMS/HCC).    The patient is a 78-year-old  female with a history of AAA, heart block s/p cardiac pacemaker, glaucoma, hyperlipidemia, coronary artery disease, NSTEMI, who presented to [facility] on June 26 after she has not been seen by her friends for a few days.  Patient lives alone and when she was found by EMS she was confused and combative.  In the emergency department she was aphasic with right-sided weakness and hypertensive to the 190 systolic.  CT scan of the head revealed an acute infarct in left MCA with mild bleeding.  Echo showed an EF of 43% and a bubble study was negative.  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  Later this was transitioned to apixaban.  She was continued on statin for secondary prevention.  She was evaluated by speech therapy and cleared for a soft and bite-size diet with moderately thick liquids.    7/2/2023:  Diet SB6 with mildly thick liquids (MT2)  7/6/2023: Upgraded to EC7 with thin liquids    Past Medical History  Jennifer has a past medical history of AAA (abdominal aortic aneurysm), Arthritis, Elevated blood pressure reading without diagnosis of hypertension (4/19/2019), Epistaxis (1/6/2020), GERD (gastroesophageal reflux disease) (4/19/2019), Glaucoma (4/19/2019), Heart murmur, Hiatal hernia, History of hip replacement, total, right (4/19/2019), History of rheumatic fever as a child (4/19/2019), Hypercholesterolemia (4/19/2019), Ischemic cardiomyopathy (5/9/2019), Kidney cysts, Osteoarthritis of multiple joints (4/19/2019), Osteoporosis, Pacemaker (12/9/2019),  Raynaud's disease (4/19/2019), RSD (reflex sympathetic dystrophy) (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        OT Vitals    Date/Time Pulse HR Source BP BP Location BP Method Pt Position [facility]   07/13/23 1158 74 Monitor 134/79 Left upper arm Automatic Lying [place]      OT Pain    Date/Time Pain Type Rating: Rest Rating: Activity [facility]   07/13/23 1131 Pain Assessment 0 - no pain 0 - no pain AK   07/13/23 1158 Pain Reassessment 0 - no pain 0 - no pain AK          Prior Living Environment    Flowsheet Row Most Recent Value   People in Home alone   Current Living Arrangements home   Home Accessibility --  [2 SH with 1 DARÍO]   Living Environment Comment Per chart review, pt lives alone in a 2SH with 1STE   Number of Stairs, Main Entrance 1   Number of Stairs, Within Home, Primary 12  [Full fight to second floor]          Prior Level of Function    Flowsheet Row Most Recent Value   Dominant Hand --  [Unable to determine 2/2 cognitive/communication deficits]   Ambulation independent   Transferring independent   Toileting independent   Bathing independent   Dressing independent   Eating independent   IADLs independent   Communication understands/communicates without difficulty   Swallowing swallows foods/liquids without difficulty   Baseline Diet/Method of Nutritional Intake no diet restrictions   Past History of Dysphagia no   Prior Level of Function Comment Per chart review: PMH SB6,  MT2,  No VFSS in EMR.  Pt seen by SLP at [place].   Assistive Device Currently Used at Home --  [Per chart review pt has RW at home - unclear if pt was using AD and pt unable to state PLOF at this time]          Occupational Profile    Flowsheet Row Most Recent Value   Occupational History/Life Experiences Will need to clarify PLOF/driving status. Pt unable to state 2/2 cognitive/communication deficits.   Environmental Supports and Barriers Per chart review, pt  lives alone but has supportive friends.   Patient Goals Pt unable to state goal 2/2 communication deficits.           IRF OT Evaluation and Treatment - 07/13/23 1131        OT Time Calculation    Start Time 1130     Stop Time 1200     Time Calculation (min) 30 min        Session Details    Document Type Daily Treatment/Progress Note     Mode of Treatment occupational therapy;individual therapy        General Information    Patient/Family/Caregiver Comments/Observations Pt's friend/Thelma BECERRIL, present for family training session.     General Observations of Patient Pt received sitting up in bed, pleasant and cooperative t/o.        Mobility Belt    Mobility Belt Used for All Out of Bed Activity no     Reason Mobility Belt Not Used patient refused        Wrist/Hand Orthosis Management    Skin Assessment (Wrist/Hand Orthosis) Pt's friend/Thelma BECERRIL, educated on R resting hand splint. OT providing friend with custom fabricated R WHO to take home but instructing not to wear at all without supervision/instruction from HH/OP therapy so they can complete wear trials, monitor skin and make adjustments as needed.        Caregiver Training    Caregiver(s) to be Trained other (see comments)     Caregiver(s) Participating Pt's friend/POA Thelma     Caregiver Training Plan safe ADL techniques;transfer training;home exercise program     Comment, Caregiver Training Plan Pt's friend/Thelma BECERRIL, present for family training session. Pt's friend educated on pt's current functional level, recommended assistance upon d/c, safe ADL techniques and curent deficits. OT recommending supervision at all times and during all ADL tasks/mobility 2/2 decreased safety awareness/impulsivity, cognitive deficits and balance deficits. Pt's friend understanding and reporting that she will be staying with pt for one week and then another friend is flying in from [place] to stay with her for at least a month. Pt's friend confirming that pt owns a shower chair  and has tub/shower combo. OT recommending utilizing current shower chair and to remain sitting during bathing. OT also recommending installing grab bars as able to increase safety of tub transfers. OT providing education on R UE ROM HEP and weight bearing exercises to continue at home. Edu provided on driving letter process, handout provided. Pt's friend with no further questions at this time.        Transfers    Transfers tub transfer        Sit to Stand Transfer    [place], Sit to Stand Transfer supervision     Safety/Cues impulsivity     Assistive Device none     Comment From EOB and shower chair.        Stand to Sit Transfer    [place], Stand to Sit Transfer supervision     Safety/Cues impulsivity     Assistive Device none     Comment To EOB and shower chair.        Stand Pivot Transfer    [place], Stand Pivot/Stand Step Transfer supervision     Safety/Cues impulsivity     Assistive Device none     Comment Via amb approach c S 2/2 mild instability and decreased safety awareness.        Tub Transfer    Transfer Technique step over, right entry     [place], Tub Transfer close supervision     Safety/Cues impulsivity;hand placement;technique     Assistive Device grab bars/tub rail;shower chair     Comment Completed in ILU to simulate home environment c Cl S.        Impairments/Safety Issues    Comment, Safety Issues/Impairments OT: Medium distance HHM completed c S s AD.        Daily Progress Summary (OT)    Daily Outcome Statement Pt's friend, Thelma, present for family training session. Pt's friend was engaged t/o and in agreement with all recommendations. Pt and pt's friend had no further questions at this time. No further training required. Goals resolved as pt is set to d/c tomorrow.                      Education Documentation  Resource Manual, taught by Daniela Corrales OT at 7/13/2023  3:18 PM.  Learner: Other, Patient  Readiness: Acceptance  Method: Explanation, Demonstration,  Handout  Response: Verbalizes Understanding, Demonstrated Understanding  Comment: Pt's friend/Thelma BECERRIL, present for family training session. Edu on pt's functional status, reccommended assistance upon d/c, safe ADL techniques, driving letter process, R WHO, safe mobility and DME reccomendations. Resource manual provided.    Safe ADL Techniques, taught by Daniela Corrales OT at 7/13/2023  3:18 PM.  Learner: Other, Patient  Readiness: Acceptance  Method: Explanation, Demonstration, Handout  Response: Verbalizes Understanding, Demonstrated Understanding  Comment: Pt's friend/Thelma BECERRIL, present for family training session. Edu on pt's functional status, reccommended assistance upon d/c, safe ADL techniques, driving letter process, R WHO, safe mobility and DME reccomendations. Resource manual provided.    Driving, taught by Daniela Corrales, PATRICIA at 7/13/2023  3:18 PM.  Learner: Other, Patient  Readiness: Acceptance  Method: Explanation, Demonstration, Handout  Response: Verbalizes Understanding, Demonstrated Understanding  Comment: Pt's friend/Thelma BECERRIL, present for family training session. Edu on pt's functional status, reccommended assistance upon d/c, safe ADL techniques, driving letter process, R WHO, safe mobility and DME reccomendations. Resource manual provided.          IRF OT Goals    Flowsheet Row Most Recent Value   Transfer Goal 1    Activity/Assistive Device toilet at 07/02/2023 0711   [place] supervision required at 07/11/2023 0730   Time Frame short-term goal (STG), 5 - 7 days at 07/11/2023 0730   Progress/Outcome goal met at 07/13/2023 1131   Transfer Goal 2    Activity/Assistive Device toilet at 07/02/2023 0711   [place] supervision required at 07/02/2023 0711   Time Frame long-term goal (LTG), 14 days or less at 07/02/2023 0711   Progress/Outcome goal met at 07/13/2023 1131   Transfer Goal 3    Activity/Assistive Device shower at 07/02/2023 0711   [place] --  [Cl S] at 07/02/2023  0711   Time Frame short-term goal (STG), 5 - 7 days at 07/11/2023 0730   Progress/Outcome goal met at 07/13/2023 1131   Transfer Goal 4    Activity/Assistive Device shower at 07/02/2023 0711   [place] supervision required at 07/02/2023 0711   Time Frame long-term goal (LTG), 14 days or less at 07/02/2023 0711   Progress/Outcome goal met at 07/13/2023 1131   Bathing Goal 1    [place] --  [Steadying A] at 07/02/2023 0711   Time Frame short-term goal (STG), 5 - 7 days at 07/11/2023 0730   Progress/Outcome goal met at 07/13/2023 1131   Bathing Goal 2    [place] supervision required at 07/02/2023 0711   Time Frame long-term goal (LTG), 14 days or less at 07/02/2023 0711   Progress/Outcome goal met at 07/13/2023 1131   UB Dressing Goal 1    [place] --  [Cl S] at 07/05/2023 0702   Time Frame short-term goal (STG), 5 - 7 days at 07/11/2023 0730   Progress/Outcome goal met at 07/13/2023 1131   UB Dressing Goal 2    [place] supervision required at 07/13/2023 0645   Time Frame long-term goal (LTG), 14 days or less at 07/02/2023 0711   Progress/Outcome goal met at 07/13/2023 1131   LB Dressing Goal 1    [place] --  [Cl S] at 07/05/2023 0702   Time Frame short-term goal (STG), 5 - 7 days at 07/11/2023 0730   Progress/Outcome goal met at 07/13/2023 1131   LB Dressing Goal 2    [place] supervision required at 07/02/2023 0711   Time Frame long-term goal (LTG), 14 days or less at 07/02/2023 0711   Progress/Outcome goal met at 07/13/2023 1131   Grooming Goal 1    [place] supervision required at 07/11/2023 0730   Time Frame short-term goal (STG), 5 - 7 days at 07/11/2023 0730   Progress/Outcome goal met at 07/13/2023 1131   Grooming Goal 2    [place] supervision required at 07/13/2023 0645   Time Frame long-term goal (LTG), 14 days or less at 07/02/2023 0711   Progress/Outcome goal met at 07/13/2023 1131   Toileting Goal 1    [place] supervision required at 07/11/2023 0730   Time Frame  short-term goal (STG), 5 - 7 days at 07/11/2023 0730   Progress/Outcome goal met at 07/13/2023 1131   Toileting Goal 2    [place] supervision required at 07/02/2023 0711   Time Frame long-term goal (LTG), 14 days or less at 07/02/2023 0711   Progress/Outcome goal met at 07/13/2023 1131

## 2023-07-13 NOTE — PROGRESS NOTES
Physical Medicine and Rehabilitation Progress Note          Patient was seen and examined.   Attestation Notes: Face to face encounter completed    Subjective     Interval History: No acute overnight events reported. Patient seen and examined. Patient seen ambulating in hallways with therapist and friend. Patients continues to have aphasia and limited communication but is excited about being discharged tomorrow. Patient engaged in and tolerating therapy. Medically stable for discharge tomorrow.     Medications e-prescribed to pharmacy.     Current functional status:   Bed mobility: mod I  Sit to stand: supervision   Gait: 300 feet with close supervision       Review of Systems:  Negative except as per HPI    Objective     Vital signs in last 24 hours:  Temp:  [36.6 °C (97.9 °F)] 36.6 °C (97.9 °F)  Heart Rate:  [] 74  Resp:  [16] 16  BP: (125-134)/(60-91) 134/79    Physical Exam      General Appearance:        Alert, cooperative, no distress   Head:    Normocephalic,atraumatic   Eyes:    Conjunctiva clear      Lungs:     Clear to auscultation bilaterally, respirations unlabored   Heart:    Regular rate and rhythm, no murmur   Abdomen:     Soft, non-tender, bowel sounds active   Extremities:     Extremities normal, atraumatic, no cyanosis or edema           Skin:   No rashes or lesions   Neurologic:    Behavior/  Emotional:   Awake, alert, aphasic. Verbal output limited to incoherent sounds. Able to follow simple commands. + splint      Appropriate, cooperative                  Results from last 7 days   Lab Units 07/10/23  0514   WBC K/uL 5.12   RBC M/uL 4.80   HEMOGLOBIN g/dL 15.0   HEMATOCRIT % 45.0   PLATELETS K/uL 204   MCV fL 93.8   RDW % 13.2   EOS ABS AUTO K/uL 0.11       Results from last 7 days   Lab Units 07/10/23  0514 07/07/23  1205 07/06/23  1453   SODIUM mEQ/L 140 139 141   POTASSIUM mEQ/L 4.2 4.4 4.5   CHLORIDE mEQ/L 105 104 107   CO2 mEQ/L 27 26 28   BUN mg/dL 11 9 7   CREATININE mg/dL 0.8  0.7 0.8   EGFR mL/min/1.73m*2 >60.0 >60.0 >60.0   ANION GAP mEQ/L 8 9 6       Current Facility-Administered Medications   Medication Dose Route Frequency   • acetaminophen  650 mg oral q6h PRN   • apixaban  5 mg oral BID   • cycloSPORINE  1 drop Both Eyes q12h ROBERTO   • lansoprazole  30 mg oral Daily before breakfast   • melatonin  3 mg oral Nightly   • rosuvastatin  20 mg oral Nightly   • senna  1 tablet oral 2x daily PRN   • trazodone  25 mg oral Nightly     Acute ischemic left MCA stroke (CMS/HCC)  The patient is a 78-year-old  female with a history of AAA, heart block s/p cardiac pacemaker, glaucoma, hyperlipidemia, coronary artery disease, NSTEMI, who presented to [facility] on June 26 after she has not been seen by her friends for a few days.   In the emergency department she was aphasic with right-sided weakness and hypertensive to the 190 systolic.  CT scan of the head revealed an acute infarct in left MCA with mild bleeding.  Echo showed an EF of 43% and a bubble study was negative.  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  Later this was transitioned to apixaban.        -Full program PT/OT/SLP/rehabilitation RN/psychology. Consult internal medicine for comorbidities.  -Continue apixaban  -Continue statin  -Sleep - melatonin and trazodone  -Safety - one-to-one               Follow-up exam  Dispo: 7/14 home with assist, home with HC before transition to OP      PCP  Cardiology Dr. Lorenzo for her cardiac function and anticoagulation treatment  Neurology   PCP    Coronary artery disease involving native heart without angina pectoris  Continue apixaban    Glaucoma  Continue eye drops    Left ventricular apical thrombus  Continue apixaban    Right wrist drop  Splinting, contracture prevention        Plan of care was discussed with patient

## 2023-07-13 NOTE — PROGRESS NOTES
Patient: Jennifer Sams  Location: [facility] [address]  MRN: [id number]  Today's date: 7/13/2023    History of Present Illness  Jennifer is a 78 y.o. female admitted on 7/1/2023 with Acute ischemic left MCA stroke (CMS/HCC) [I63.512]. Principal problem is Acute ischemic left MCA stroke (CMS/HCC).    The patient is a 78-year-old  female with a history of AAA, heart block s/p cardiac pacemaker, glaucoma, hyperlipidemia, coronary artery disease, NSTEMI, who presented to [facility] on June 26 after she has not been seen by her friends for a few days.  Patient lives alone and when she was found by EMS she was confused and combative.  In the emergency department she was aphasic with right-sided weakness and hypertensive to the 190 systolic.  CT scan of the head revealed an acute infarct in left MCA with mild bleeding.  Echo showed an EF of 43% and a bubble study was negative.  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  Later this was transitioned to apixaban.  She was continued on statin for secondary prevention.  She was evaluated by speech therapy and cleared for a soft and bite-size diet with moderately thick liquids.    7/2/2023:  Diet SB6 with mildly thick liquids (MT2)  7/6/2023: Upgraded to EC7 with thin liquids    Past Medical History  Jennifer has a past medical history of AAA (abdominal aortic aneurysm), Arthritis, Elevated blood pressure reading without diagnosis of hypertension (4/19/2019), Epistaxis (1/6/2020), GERD (gastroesophageal reflux disease) (4/19/2019), Glaucoma (4/19/2019), Heart murmur, Hiatal hernia, History of hip replacement, total, right (4/19/2019), History of rheumatic fever as a child (4/19/2019), Hypercholesterolemia (4/19/2019), Ischemic cardiomyopathy (5/9/2019), Kidney cysts, Osteoarthritis of multiple joints (4/19/2019), Osteoporosis, Pacemaker (12/9/2019),  Raynaud's disease (4/19/2019), RSD (reflex sympathetic dystrophy) (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        SLP Pain    Date/Time Pain Type Rating: Rest Rating: Activity [facility]   07/13/23 0833 Pain Assessment 0 - no pain 0 - no pain CMP   07/13/23 0927 Pain Reassessment 0 - no pain 0 - no pain CMP          Prior Living Environment    Flowsheet Row Most Recent Value   People in Home alone   Current Living Arrangements home   Home Accessibility --  [2 SH with 1 DARÍO]   Living Environment Comment Per chart review, pt lives alone in a 2SH with 1STE   Number of Stairs, Main Entrance 1   Number of Stairs, Within Home, Primary 12  [Full fight to second floor]          Prior Level of Function    Flowsheet Row Most Recent Value   Dominant Hand --  [Unable to determine 2/2 cognitive/communication deficits]   Ambulation independent   Transferring independent   Toileting independent   Bathing independent   Dressing independent   Eating independent   IADLs independent   Communication understands/communicates without difficulty   Swallowing swallows foods/liquids without difficulty   Baseline Diet/Method of Nutritional Intake no diet restrictions   Past History of Dysphagia no   Prior Level of Function Comment Per chart review: PMH SB6,  MT2,  No VFSS in EMR.  Pt seen by SLP at [place].   Assistive Device Currently Used at Home --  [Per chart review pt has RW at home - unclear if pt was using AD and pt unable to state PLOF at this time]           IRF SLP Evaluation and Treatment - 07/13/23 0833        SLP Time Calculation    Start Time 0830     Stop Time 0930     Time Calculation (min) 60 min        Session Details    Document Type Discharge Evaluation     Mode of Treatment speech language pathology;individual therapy        General Information    Patient/Family/Caregiver Comments/Observations Pt's friend Thelma present for second half of  "session for family training     General Observations of Patient Pt received in bed, handoff from 1:1 at the beginning and end of session. Pleasant and cooperative.        Caregiver Training    Caregiver(s) to be Trained other (see comments)   POA/friend, Thelma    Caregiver(s) Participating Pt's friend Thelma     Caregiver Training Plan communication compensatory strategies;compensatory swallowing strategies;dysphagia diet consistency     Comment, Caregiver Training Plan Pt's friend Thelma present for scheduled family training session this date. This SLP and Thelma had recently spoken on the phone regarding dysphagia diet. Pt currently tolerating EC7/thins diet with use of R sided finger sweep and consistent use of liquid wash. Handouts provided on modified diet and approved items. Discussed aphasia vs apraxia and impact on verbal expression. SLP and pt demonstrated use of Melodic Intonation Therapy to target verbal expression. Discussed compensatory strategies to aid auditory comprehension (simplification of language, use of visual cues/stimuli, increased processing time). Handouts provided on apraxia, aphasia, and communication tips for friends/loved ones. Thelma is aware of recommendation for 24/7 supervision in the home environment d/t impulsivity. Discussed introduction of copying task using letter tiles for 3 letter words and how to implement at home. Pt and friend aware of recommendation of continued therapy via home health with eventual transition to outpatient. All questions/concerns answered at this time.        Motor Speech    Motor Speech Intervention articulation/speech intelligibility     Comment, Motor Speech Intervention Pt repeated single phonemes /a/, /i/, /u/, /m/ with 90% acc. given clinician models. Pt with max difficulty with attempts to imitiate /p/ in isolation. Given cue to repeat \"pop\" in context of popping a balloon, pt produced \"op\" on 10/10 attempts. Continues with limited volitional articulatory " "placement d/t severity of apraxia.        Verbal Expression    Verbal Expression Intervention word retrieval activities     Comment, Intervention (Verbal Expression) Similar to previous session, SLP used elements of VICTORIA to target verbal expression. Pt tapped along with L hand to three beat tone while SLP sang \"how are you\". Pt hummed along to beat on 10 repetitions. In unison with SLP while tapping hand, pt produced \"how\" and \"you\" on 8/15 opportunities. Pt unable to produce \"how\" out of unison with SLP. Continued difficulty producing \"are\", made approximation on 2/15 trials.        Food and Liquid Trials (NIS)    Patient Positioning other (see comments)   edge of bed seating    Oral Intake/Feeding Performance set-up of food/liquid needed     Liquid Consistencies Evaluated thin liquids     Thin Liquids patient-controlled amounts;sips from cup     Comment, Thin Liquids water, orange juice     Food Consistencies Evaluated easy to chew (EC7)     Easy to Chew (EC7) use of teaspoon/fork     Comment, Easy to Chew (EC7) cheese omelette     Oral Preparatory Phase of Swallow mastication, slow but effective     Oral Phase of Swallow oral residue, incomplete swallow     Comment, Oral Phase Scattered residue with bites of cheese omelette, cleared with several liquid washes     Pharyngeal Phase of Swallow no clinical symptoms     Comment, Pharyngeal Phase No overt s/sx of aspiration with EC7/thins tray     Esophageal Phase of Swallow no clinical symptoms     Comment Pt continues to tolerate EC7/thins tray with min cues for safe swallowing strategies (R sided lingual sweep and liquid wash) to clear scattered residue. No overt s/sx of aspiration        Functional Communication Measures    FCM: Reading 2-->Level 2     FCM: Spoken Language, Comprehension 4-->Level 4     FCM: Spoken Language, Expression 2-->Level 2     FCM: Swallowing 6-->Level 6     FCM: Writing 2-->Level 2        Therapy Plan Review/Discharge Plan (SLP)    SLP " "Recommended Discharge Disposition home with home health;home with assistance        Discharge Summary (SLP)    Outcomes Achieved Upon Discharge (SLP) goals partially achieved prior to discharge     Discharge Summary Statement (SLP) Pt is a 78 y.o. female who receiving comprehensive rehabilitation services following stroke. Upon discharge, pt has made some progress towards goals relating to auditory comprehension, verbal expression, and motor speech skills. Pt continues with severe apraxia of speech and mod-severe aphasia with receptive skills > expressive skills. Pt continues with mild oral phase dysphagia characterized by disorganized mastication with residue in R-side buccal cavity and scattered on lingual surface. Pt tolerating EC7/thins diet with supervision during meals. Pt requires 24/7 SPV d/t impulsivity. Pt produces single words \"you, me, I, no, yes\" consistently, but generalizes productions for other meanings. Requires clinician models and mod-max cues for repetition of single phonemes. Pt has responded positively to implementation of Melodic Intonantion Therapy to target verbal expression. Auditory comprehension is a relative strength, but pt benefits from repetitions, increased processing time, and visual cues for mod level information. Pt will continue to benefit from skilled ST via home health upon d/c to the home.     Transfer to Another Level of Care or Facility (SLP) recommend therapy via home health                 Education Documentation  Communication, taught by Isabel Orantes CF-SLP at 7/13/2023  9:34 AM.  Learner: Designated Lay Caregiver, Patient  Readiness: Acceptance  Method: Explanation  Response: Verbalizes Understanding  Comment: Education on modified diet, compensatory communication strategies, and cueing techniques provided to pt and POA Thelma.    Diet Modification, taught by Isabel Orantes CF-SLP at 7/13/2023  9:34 AM.  Learner: Designated Lay Caregiver, " Patient  Readiness: Acceptance  Method: Explanation  Response: Verbalizes Understanding  Comment: Education on modified diet, compensatory communication strategies, and cueing techniques provided to pt and JONE Werner          IRF SLP Goals    Flowsheet Row Most Recent Value   Auditory Comprehension Goal 1    Auditory Comprehension Goal 1 pt will complete basic Aud comprehension tasks (simple yes/no 75%),  1 step commands 50% with max cues,  match word heard to picture f/o 2 50% max cues at 07/02/2023 1001   Time Frame short-term goal (STG), 1 week at 07/02/2023 1001   Progress/Outcome goal met at 07/11/2023 0813   Auditory Comprehension Goal 2    Auditory Comprehension Goal 2 Pt will understand mod-complex level y/n questions 90% min cues,  2 step commands 75% min-mod cues,  at 07/02/2023 1001   Time Frame long-term goal (LTG), 4 weeks at 07/02/2023 1001   Progress/Outcome goal met at 07/13/2023 0833   Verbal Expression Goal 1    Verbal Expression Goal 1 Pt will complete basic VE tasks (name. automatics, simple opposites, naming) with 20% accuracy and max cues. at 07/02/2023 1001   Time Frame short-term goal (STG), 1 week at 07/02/2023 1001   Progress/Outcome goal not met at 07/13/2023 0833   Verbal Expression Goal 2    Verbal Expression Goal 2 Pt will complete Verbal expression tasks (basic-mod) with 50-75% accuracy and min-mod cues.  Pt will imitate vowels 50% max cues,  at 07/02/2023 1001   Time Frame long-term goal (LTG), 4 weeks at 07/02/2023 1001   Progress/Outcome goal not met at 07/13/2023 0833   Motor Speech/Voice Goal 1    Motor Speech/Voice Goal 1 Repetition of vowels, CV/VC and CVC targets with max cues and models for 60% acc. at 07/04/2023 1510   Time Frame short-term goal (STG), 2 weeks at 07/04/2023 1510   Progress/Outcome goal met at 07/13/2023 0833   Motor Speech/Voice Goal 2    Motor Speech/Voice Goal 2 Repetition of vowels, CV/VC and CVC targets with min cues and models for 90% acc. at 07/04/2023  1510   Time Frame long-term goal (LTG), 4 weeks at 07/04/2023 1510   Progress/Outcome goal not met at 07/13/2023 0833   Reading Comprehension Goal 1    Reading Comprehension Goal 1 Pt will match word to picture field of 2 with 50% accuracy and mod-max cues,  Pt will read single functional words with 20% accuracy max cues at 07/02/2023 1001   Time Frame short-term goal (STG), 1 week at 07/02/2023 1001   Progress/Outcome goal no longer appropriate at 07/04/2023 1510   Reading Comprehension Goal 2    Reading Comprehension Goal 2 Pt will read simple sentences and comprehend simple sentences with 75% accuracy min-mod cues at 07/02/2023 1001   Time Frame long-term goal (LTG) at 07/02/2023 1001   Progress/Outcome goal no longer appropriate at 07/04/2023 1510   Written Expression Goal 1    Written Expression Goal 1 Pt will copy first name, shapes, letters and 3-4 letter words with 80% min cues,  pt will copy letters, name shapes with 50% accuracy and mod cues. at 07/02/2023 1001   Time Frame short-term goal (STG), 1 week at 07/02/2023 1001   Progress/Outcome goal no longer appropriate at 07/04/2023 1510   Written Expression Goal 2    Written Expression Goal 2 Pt will write first and last name,  single words 90% accuracy at 07/02/2023 1001   Progress/Outcome goal no longer appropriate at 07/04/2023 1510   Oral Nutrition/Hydration Goal 1    Activity effective/safe, use of swallowing techniques, management of texture/viscosity, other (see comments)  [SB6,  MT2.  Trials of thins and various textures by speech with min s/s of aspiration] at 07/02/2023 1001   Time Frame short-term goal (STG), 14 days or less at 07/02/2023 1001   Progress/Outcome goal met at 07/11/2023 0813   Oral Nutrition/Hydration Goal 2    Activity effective/safe, oral nutrition/hydration, use of swallowing techniques, management of texture/viscosity, other (see comments)  [regular and thins] at 07/02/2023 1001   Time Frame long-term goal (LTG), 4 weeks at  07/02/2023 1001   Progress/Outcome goal partially met at 07/13/2023 0833   Oral Motor Exercise Goal 1    Activity perform oral motor strengthening exercises, facial/cheek, lip, tongue, strength/ROM, mastication/bolus manipulation, 5-10 times per session, other (see comments)  [use mirror/visual model] at 07/02/2023 1001   [place] with 1:1 supervision/constant cues at 07/02/2023 1001   Time Frame short-term goal (STG), 14 days or less at 07/02/2023 1001   Progress/Outcome goal met at 07/11/2023 0813   Oral Motor Exercise Goal 2    Activity perform oral motor strengthening exercises, facial/cheek, lip, tongue, strength/ROM, mastication/bolus manipulation, other (see comments)  [2-3 times a day sets of 10-15] at 07/02/2023 1001   [place] with minimal cues (75-90% accuracy) at 07/02/2023 1001   Time Frame long-term goal (LTG), 4 weeks at 07/02/2023 1001   Progress/Outcome goal ongoing at 07/11/2023 0813

## 2023-07-13 NOTE — PLAN OF CARE
Problem: Rehabilitation (IRF) Plan of Care  Goal: Plan of Care Review  Outcome: Progressing  Flowsheets (Taken 7/13/2023 1949)  Progress: improving  Plan of Care Reviewed With: patient  Outcome Evaluation: Pt safe to d/c home tomorrow with S of pt's friend/POA.  No PT equipment required.  Recommend ongoing PT via outpatient services as able.

## 2023-07-13 NOTE — DISCHARGE INSTRUCTIONS
Follow ups:    - Cardiology Dr. Lorenzo for her cardiac function and anticoagulation treatment  - Neurology   - PCP    Do not drive until cleared by a physician.

## 2023-07-13 NOTE — DISCHARGE INSTR - ACTIVITY
"Occupational Therapy:    Toilet Transfers: Sharlene completes toilet transfers with close supervision.    Shower/Tub Transfers: Sharlene completes tub transfers with steadying assist/close supervision.     Upper Body Dressing: Sharlene completes upper body dressing with supervision while seated.    Lower Body Dressing: Sharlene completes lower body dressing with supervision while seated to thread legs into pants and standing to manage up over hips.    Bathing: Sharlene completes bathing with close supervision, recommend sitting on shower chair for bathing.    Toileting: Sharlene completes toileting with supervision.    Grooming: Sharlene completes grooming with supervision and requires assistance/cues to sequence brushing teeth.    Household Mobility/Household Activity: Sharlene completes household mobility with close supervision without an assistive device.    Driving: Driving is unsafe and not recommended at this time. Consult your physician for approval before resuming driving. Driving letter submitted.     Upper Extremity Management: Continue wrist and finger exercises per provided handouts/home exercise program. Do not wear right resting hand splint without supervision/instruction from home health or outpatient occupational therapist.     Entered by: EMERALD Fleming on: 7/13/2023      Physical Therapy: Sharlene should wear her mobility belt whenever she is on her feet for added safety.     Bed mobility: Sharlene can transition from lying down to sitting up without assistance     Transfers: Sit to stand and stand pivot transfers without an assistive device with supervision for safety     Ambulation: Sharlene can ambulate >300' without an assistive device with close supervision (within arm's reach) for safety     Elevations: Close supervision (within arms reach) to complete a full flight with 1 rail; close supervision ramp/6\" curb without an assistive device.  Sharlene frequently prefers to hold onto someone's arm to aid her stability on uneven " "terrain.      Entered by: Melba Hill PT on: 7/13/23       Additional:     Weight-Bearing Status: as tolerated bilateral lower extremities     Precautions: Sharlene's balance remains impaired.  Additionally, she can be impulsive at times with poor awareness of her surrounding.  Recommend someone be within arm's reach of her at all times     Durable Medical Equipment:No physical therapy equipment required    Home Exercise Program: Recommend follow up in homecare PT - complete OT wrist/finger exercises per handouts      SPEECH THERAPY:     Swallowing:  Please continue your Easy to Chew (Level 7) and Thin Liquids (Level 0) and take your medication whole in a spoonful of puree food (like applesauce) . Continue to use your swallowing strategies: sit as upright as possible, alternate food and liquid, decrease bite/drink size, and check mouth for residue after meals.     Communication:  You currently have aphasia and apraxia. You communicate the best when you use the following strategies: sound cues (such as \"ba\" for \"ball\"), gestures, simple directions, and yes/no questions. You also do better when you are given more time to think and speak or understand. Continue to encourage repetition of single vowels and phonemes and use of gestures to aid communication.    Entered by Isabel Orantes, CF-SLP on 7/13/2023              "

## 2023-07-13 NOTE — PLAN OF CARE
Problem: Rehabilitation (IRF) Plan of Care  Goal: Plan of Care Review  Outcome: Progressing  Flowsheets (Taken 7/13/2023 1282)  Progress: improving  Plan of Care Reviewed With: patient  Outcome Evaluation: Pt continent of bladder. 1:1 at bedside for impulsivity. Family education performed with friend Thelma. Still continues to have a poor appetite. Call bell within reach at all times.   Plan of Care Review  Plan of Care Reviewed With: patient  Progress: improving  Outcome Evaluation: Pt continent of bladder. 1:1 at bedside for impulsivity. Family education performed with friend Thelma. Still continues to have a poor appetite. Call bell within reach at all times.

## 2023-07-13 NOTE — PROGRESS NOTES
Patient: Jennifer Sams  Location: [facility] [address]  MRN: [id number]  Today's date: 7/13/2023    History of Present Illness  Jennifer is a 78 y.o. female admitted on 7/1/2023 with Acute ischemic left MCA stroke (CMS/HCC) [I63.512]. Principal problem is Acute ischemic left MCA stroke (CMS/HCC).    The patient is a 78-year-old  female with a history of AAA, heart block s/p cardiac pacemaker, glaucoma, hyperlipidemia, coronary artery disease, NSTEMI, who presented to [facility] on June 26 after she has not been seen by her friends for a few days.  Patient lives alone and when she was found by EMS she was confused and combative.  In the emergency department she was aphasic with right-sided weakness and hypertensive to the 190 systolic.  CT scan of the head revealed an acute infarct in left MCA with mild bleeding.  Echo showed an EF of 43% and a bubble study was negative.  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  Later this was transitioned to apixaban.  She was continued on statin for secondary prevention.  She was evaluated by speech therapy and cleared for a soft and bite-size diet with moderately thick liquids.    7/2/2023:  Diet SB6 with mildly thick liquids (MT2)  7/6/2023: Upgraded to EC7 with thin liquids    Past Medical History  Jennifer has a past medical history of AAA (abdominal aortic aneurysm), Arthritis, Elevated blood pressure reading without diagnosis of hypertension (4/19/2019), Epistaxis (1/6/2020), GERD (gastroesophageal reflux disease) (4/19/2019), Glaucoma (4/19/2019), Heart murmur, Hiatal hernia, History of hip replacement, total, right (4/19/2019), History of rheumatic fever as a child (4/19/2019), Hypercholesterolemia (4/19/2019), Ischemic cardiomyopathy (5/9/2019), Kidney cysts, Osteoarthritis of multiple joints (4/19/2019), Osteoporosis, Pacemaker (12/9/2019),  Raynaud's disease (4/19/2019), RSD (reflex sympathetic dystrophy) (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         07/13/23 0701 07/13/23 0757   Pain/Comfort/Sleep   Pain Charting Type Pain Assessment Pain Reassessment   Preferred Pain Scale word (verbal rating pain scale) word (verbal rating pain scale)   Word Pain Rating: Rest 0 - no pain 0 - no pain   Word Pain Rating: Activity 0 - no pain 0 - no pain   Vitals   Heart Rate  --  63   Heart Rate Source  --  Left Radial   BP  --  127/82   BP Location  --  Left upper arm   BP Method  --  Manual   Patient Position  --  Lying         Prior Living Environment    Flowsheet Row Most Recent Value   People in Home alone   Current Living Arrangements home   Home Accessibility --  [2 SH with 1 DARÍO]   Living Environment Comment Per chart review, pt lives alone in a 2SH with 1STE   Number of Stairs, Main Entrance 1   Number of Stairs, Within Home, Primary 12  [Full fight to second floor]          Prior Level of Function    Flowsheet Row Most Recent Value   Dominant Hand --  [Unable to determine 2/2 cognitive/communication deficits]   Ambulation independent   Transferring independent   Toileting independent   Bathing independent   Dressing independent   Eating independent   IADLs independent   Communication understands/communicates without difficulty   Swallowing swallows foods/liquids without difficulty   Baseline Diet/Method of Nutritional Intake no diet restrictions   Past History of Dysphagia no   Prior Level of Function Comment Per chart review: PMH SB6,  MT2,  No VFSS in EMR.  Pt seen by SLP at [place].   Assistive Device Currently Used at Home --  [Per chart review pt has RW at home - unclear if pt was using AD and pt unable to state PLOF at this time]          Occupational Profile    Flowsheet Row Most Recent Value   Occupational History/Life Experiences Will need to clarify  PLOF/driving status. Pt unable to state 2/2 cognitive/communication deficits.   Environmental Supports and Barriers Per chart review, pt lives alone but has supportive friends.   Patient Goals Pt unable to state goal 2/2 communication deficits.             07/13/23 0701   OT Time Calculation   Start Time 0700   Stop Time 0800   Time Calculation (min) 60 min   Session Details   Document Type Discharge Evaluation   Mode of Treatment occupational therapy;individual therapy   General Information   General Observations of Patient Pt received lyring supine in bed, asleep.   Mobility Belt   Mobility Belt Used for All Out of Bed Activity no   Reason Mobility Belt Not Used patient refused   Bed Mobility   Comment OT: Supine to sit EOB c Mod I.   Sit to Stand Transfer   [place], Sit to Stand Transfer supervision   Safety/Cues impulsivity   Assistive Device none   Comment From EOB and DME surfaces.   Stand to Sit Transfer   [place], Stand to Sit Transfer supervision   Safety/Cues impulsivity   Assistive Device none   Comment To EOB and DME surfaces.   Stand Pivot Transfer   [place], Stand Pivot/Stand Step Transfer supervision   Safety/Cues impulsivity   Assistive Device none   Comment Via amb approach c S 2/2 mild instability and decreased safety awareness.   Toilet Transfer   Transfer Technique stand pivot   [place] supervision   Safety/Cues impulsivity   Assistive Device none   Comment Via amb approach c S 2/2 mild instability and decreased safety awareness.   Shower Transfer   Transfer Technique stand pivot   [place] supervision   Safety/Cues impulsivity;technique   Assistive Device shower chair;grab bars/tub rail   Comment Via amb approach onto shower chair in barrier free shower stall c S 2/2 mild instability and decreased safety awareness.   Impairments/Safety Issues   Comment, Safety Issues/Impairments OT: Short HHM completed within pt room to/from bathroom c S s AD.   Upper Extremity  (Therapeutic Exercise)   Exercise Position/Type seated;AAROM (active assistive range of motion);AROM (active range of motion)   General Exercise right   Range of Motion Exercises wrist flexion/extension   Reps and Sets 2x10   Comment AROM/AAROM wrist flexion/extension completed c OT limiting compensatory movements at pt's forearm. Mass reps completed.   Hand (Therapeutic Exercise)   Exercise Position/Type seated;PROM (passive range of motion)   General Exercise right;finger flexion/extension   Reps and Sets 2x10   Comment Gentle PROM completed while seated EOM, no active movement noted.   Bathing   Shower Provided? Yes   Tasks chest;left arm;right arm;abdomen;front perineal area;buttocks;left upper leg;right upper leg;left lower leg, including foot;right lower leg, including foot   [place] close supervision   Safety/Cues impulsivity;sequencing;compensatory techniques;attention   Position unsupported sitting;unsupported standing   Setup Assistance adaptive equipment setup;adjust water temperature/flow;obtain supplies;open containers   Adaptive Equipment shower chair;grab bar/tub rail;hand-held shower spray hose   Comment Full wet shower completed whlie pt sat on shower chair for majority of shower. Noted significant decrease in cues required to remain seated for shower, however pt does still require Min VCs to remain sitting/safety awareness, sequencing and compensatory techniques. Pt with improved ability to bathe L UE on own. Cl S for balance while in stance to wash/dry anterior hannah area/buttocks.   Upper Body Dressing   Tasks doff;don;pull over garment   [place] supervision   Safety/Cues impulsivity;malaika/one-handed technique   Position edge of bed sitting   Adaptive Equipment none   Comment Pt donned overhead shirt while seated EOB c S. Min VCs for technique.   Lower Body Dressing   Tasks doff;don;pants/bottoms   [place] supervision   Safety/Cues impulsivity   Position edge of bed  sitting;unsupported standing   Adaptive Equipment none   [place], Footwear supervision   Comment Pt able to thread BLE into underwear/pants while seated and then stand to manage over hips c S. Min VCs to manage all the way up over R hip.   Grooming   Tasks oral care (brushing teeth, cleaning dentures)   [place] supervision   Safety/Cues problem-solving;sequencing   Position unsupported standing   Adaptive Equipment none   [place], Oral Hygiene supervision   Comment At best performance, pt able to complete c L UE only c S for VCs for sequencing and problem-solving 2/2 apraxia. However does require Min A to incorporate R UE.   Toileting   Tasks adjust/manage clothing;perform bladder hygiene   [place] supervision   Safety/Cues impulsivity   Position unsupported sitting;unsupported standing   Adaptive Equipment accessible height toilet   Comment Pt continent of bladder while seated on toilet. pt completes anterior hannah hygiene while seated and stands to manage clothing c S.   Discharge Summary (OT)   Outcomes Achieved Upon Discharge (OT) all goals met within established timeframes   Discharge Summary Statement (OT) Pt is a 77 y/o female admitted to [facility] on 7/1 s/p L MCA CVA. Pt has achieved all OT functional goals, currently completing ADLs and amb level transfers s AD c Cl S/S. Pt has made gains in balance, coordination and R distal ROM, c noted active wrist extension. Pt continues to be limited by cognitive/communication deficits, decreased safety awareness/impulsivity, apraxia, R inattention and distal R UE weakness. Pt c plan to d/c home with friend providing 24/7 supervision 2/2 decreased safety awareness and cognitive deficits. OT recommending a shower chair to complete bathing while seated, which pt's friend confirmed she already owns. Driving letter submitted and pt and pt’s friend educated on driving letter process. Pt fabricated a custom resting hand orthosis, however unable to trial wear  schedule 2/2 behavioral issues, defer wearing trials, skin monitoring and further orthosis adjustments to HH/OP therapy. Pt should not wear R WHO unless supervised by HH/OP therapy. Recommend continued OT services via HH and then transition to OP to continue to address above named deficits. Goals to be resolved after family training session later this date.   Transfer to Another Level of Care or Facility (OT) recommend continued therapy following discharge               IRF OT Goals    Flowsheet Row Most Recent Value   Transfer Goal 1    Activity/Assistive Device toilet at 07/02/2023 0711   [place] supervision required at 07/11/2023 0730   Time Frame short-term goal (STG), 5 - 7 days at 07/11/2023 0730   Progress/Outcome goal met, goal revised this date at 07/11/2023 0730   Transfer Goal 2    Activity/Assistive Device toilet at 07/02/2023 0711   [place] supervision required at 07/02/2023 0711   Time Frame long-term goal (LTG), 14 days or less at 07/02/2023 0711   Progress/Outcome goal ongoing at 07/11/2023 0730   Transfer Goal 3    Activity/Assistive Device shower at 07/02/2023 0711   [place] --  [Cl S] at 07/02/2023 0711   Time Frame short-term goal (STG), 5 - 7 days at 07/11/2023 0730   Progress/Outcome goal ongoing at 07/11/2023 0730   Transfer Goal 4    Activity/Assistive Device shower at 07/02/2023 0711   [place] supervision required at 07/02/2023 0711   Time Frame long-term goal (LTG), 14 days or less at 07/02/2023 0711   Progress/Outcome goal ongoing at 07/11/2023 0730   Bathing Goal 1    [place] --  [Steadying A] at 07/02/2023 0711   Time Frame short-term goal (STG), 5 - 7 days at 07/11/2023 0730   Progress/Outcome goal ongoing at 07/11/2023 0730   Bathing Goal 2    [place] supervision required at 07/02/2023 0711   Time Frame long-term goal (LTG), 14 days or less at 07/02/2023 0711   Progress/Outcome goal ongoing at 07/11/2023 0730   UB Dressing Goal 1    [place] --  [Cl S]  at 07/05/2023 0702   Time Frame short-term goal (STG), 5 - 7 days at 07/11/2023 0730   Progress/Outcome goal ongoing at 07/11/2023 0730   UB Dressing Goal 2    [place] supervision required at 07/13/2023 0645   Time Frame long-term goal (LTG), 14 days or less at 07/02/2023 0711   Progress/Outcome goal revised this date at 07/13/2023 0645   LB Dressing Goal 1    [place] --  [Cl S] at 07/05/2023 0702   Time Frame short-term goal (STG), 5 - 7 days at 07/11/2023 0730   Progress/Outcome goal ongoing at 07/11/2023 0730   LB Dressing Goal 2    [place] supervision required at 07/02/2023 0711   Time Frame long-term goal (LTG), 14 days or less at 07/02/2023 0711   Progress/Outcome goal ongoing at 07/11/2023 0730   Grooming Goal 1    [place] supervision required at 07/11/2023 0730   Time Frame short-term goal (STG), 5 - 7 days at 07/11/2023 0730   Progress/Outcome goal met, goal revised this date at 07/11/2023 0730   Grooming Goal 2    [place] set-up required at 07/02/2023 0711   Time Frame long-term goal (LTG), 14 days or less at 07/02/2023 0711   Progress/Outcome goal ongoing at 07/11/2023 0730   Toileting Goal 1    [place] supervision required at 07/11/2023 0730   Time Frame short-term goal (STG), 5 - 7 days at 07/11/2023 0730   Progress/Outcome goal met, goal revised this date at 07/11/2023 0730   Toileting Goal 2    [place] supervision required at 07/02/2023 0711   Time Frame long-term goal (LTG), 14 days or less at 07/02/2023 0711   Progress/Outcome goal ongoing at 07/11/2023 0730

## 2023-07-13 NOTE — PLAN OF CARE
Plan of Care Review  Plan of Care Reviewed With: patient  Progress: improving  Outcome Evaluation: Patient continue 1:1, cooperative. Denies any discomfort. Poor intake, emcouagd fluids and snacks. fall risk r/t unsteady gait. Continent of bowel and bladder. Slept well w/o any distress.

## 2023-07-14 VITALS
TEMPERATURE: 97.9 F | SYSTOLIC BLOOD PRESSURE: 116 MMHG | HEART RATE: 68 BPM | HEIGHT: 68 IN | WEIGHT: 127.5 LBS | OXYGEN SATURATION: 93 % | BODY MASS INDEX: 19.32 KG/M2 | DIASTOLIC BLOOD PRESSURE: 58 MMHG | RESPIRATION RATE: 18 BRPM

## 2023-07-14 PROCEDURE — 63700000 HC SELF-ADMINISTRABLE DRUG: Performed by: HOSPITALIST

## 2023-07-14 RX ADMIN — APIXABAN 5 MG: 5 TABLET, FILM COATED ORAL at 08:09

## 2023-07-14 RX ADMIN — CYCLOSPORINE 1 DROP: 0.5 EMULSION OPHTHALMIC at 08:08

## 2023-07-14 RX ADMIN — LANSOPRAZOLE 30 MG: KIT at 08:11

## 2023-07-14 NOTE — PLAN OF CARE
Plan of Care Review  Plan of Care Reviewed With: patient  Progress: improving  Outcome Evaluation: Patient alert, no s/s pain or discomfort observed, remains on 1:1 round a clock. Well tolerated medications with applesauce. Continent of bowel and bladder. No distress observed. Slept well throughout the night. Safety measures in place.

## 2023-07-14 NOTE — DISCHARGE SUMMARY
Rehab Discharge Summary  BRIEF OVERVIEW        Admitting Provider: Liv Haywood MD  Discharge Provider: Liv Haywood MD  Primary Care Physician at Discharge: Chris Wang [phone]     Admission Date: 7/1/2023     Discharge Date: 7/14/2023      Primary Discharge Diagnosis  Acute ischemic left MCA stroke (CMS/HCC)    Secondary Discharge Diagnosis  Glaucoma  AAA  Cardiac pacemaker   Right wrist drop    Discharge Disposition  Home   Code Status at Discharge: Full Code    Discharge Medications     Medication List      START taking these medications    lansoprazole 3 mg/mL suspension oral suspension  Commonly known as: PREVACID  Take 10 mL (30 mg total) by mouth daily before breakfast Indications: gastroesophageal reflux disease, heartburn, stress ulcer prevention.  Dose: 30 mg     traZODone 50 mg tablet  Commonly known as: DESYREL  Take 0.5 tablets (25 mg total) by mouth nightly.  Dose: 25 mg        CHANGE how you take these medications    cycloSPORINE 0.05 % ophthalmic emulsion  Commonly known as: RESTASIS  Administer 1 drop into both eyes every 12 (twelve) hours.  Dose: 1 drop  What changed: when to take this     rosuvastatin 20 mg tablet  Commonly known as: CRESTOR  Take 1 tablet (20 mg total) by mouth nightly.  Dose: 20 mg  What changed: when to take this        CONTINUE taking these medications    apixaban 5 mg tablet  Commonly known as: ELIQUIS  Take 1 tablet (5 mg total) by mouth 2 (two) times a day Indications: treatment to prevent blood clots in chronic atrial fibrillation.  Dose: 5 mg        STOP taking these medications    acetaminophen 325 mg tablet  Commonly known as: TYLENOL     nitroglycerin 0.4 mg SL tablet  Commonly known as: NITROSTAT     pantoprazole 40 mg EC tablet  Commonly known as: PROTONIX     senna 8.6 mg tablet  Commonly known as: SENOKOT            Active Issues Requiring Follow-up  Issue: N/a    Follow-up Appointments Arranged: No     Outpatient Follow-Up             In 1 month Ana Calderon, PT [facility] - Physical Therapy    In 1 month Barry Rosas OT [facility] - Occupational Therapy    In 1 month Magnolia Lundy, CCC-SLP [facility] - Speech Language Pathology    In 2 months Jermaine Carolina MD [facility] Electrophysiology at [facility]    In 2 months Jermaine Salter MD [facility] General Cardiology at [facility]            Test Results Pending at Discharge  Unresulted Labs (From admission, onward)     Start     Ordered    07/10/23 0600  Basic metabolic panel  Every Monday 6AM      Question:  Release to patient  Answer:  Immediate   Start Status   07/17/23 0600 Scheduled   07/24/23 0600 Scheduled   07/31/23 0600 Scheduled       07/06/23 1648    07/10/23 0600  CBC and Differential  Every Monday 6AM      Question:  Release to patient  Answer:  Immediate   Start Status   07/17/23 0600 Scheduled   07/24/23 0600 Scheduled   07/31/23 0600 Scheduled       07/06/23 1648               DETAILS OF HOSPITAL STAY   Presenting Problem/History of Present Illness  Acute ischemic left MCA stroke (CMS/HCC) [I63.512]  Per admission HPI, 'Jennifer Sams is a 78 y.o. female whose primary indication for inpatient rehabilitation is: Stroke:  01.2  Right Body Involvement (Left Brain).      History limited by aphasia.  Collateral provided by friend at bedside.     The patient is a 78-year-old  female with a history of AAA, heart block s/p cardiac pacemaker, glaucoma, hyperlipidemia, coronary artery disease, NSTEMI, who presented to [facility] on June 26 after she has not been seen by her friends for a few days.  Patient lives alone and when she was found by EMS she was confused and combative.  In the emergency department she was aphasic with right-sided weakness and hypertensive to the 190 systolic.  CT scan of the head revealed an acute infarct in left  MCA with mild bleeding.  Echo showed an EF of 43% and a bubble study was negative.  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  Later this was transitioned to apixaban.  She was continued on statin for secondary prevention.  She required virtual shivani while in the hospital but this was not effective.  She was evaluated by speech therapy and cleared for a soft and bite-size diet with moderately thick liquids.'    Hospital Course  The patient participated in a comprehensive rehabilitation program including physical therapy, occupational therapy, speech therapy, psychology, rehabilitation nursing, while under physiatric supervision and made good functional progress.    Functional status at discharge:   Bed mobility: mod I  Transfers: Mod I  Gait: supervision   Balance: within functional limits    Right wrist drop  Assessment & Plan  Splinting, contracture prevention    Follow-up exam  Assessment & Plan  Dispo: 7/14 home with assist, home with HC before transition to OP      PCP  Cardiology Dr. Lorenzo for her cardiac function and anticoagulation treatment  Neurology   PCP    Left ventricular apical thrombus  Assessment & Plan  Continue apixaban    Coronary artery disease involving native heart without angina pectoris  Assessment & Plan  Continue apixaban    Glaucoma  Assessment & Plan  Continue eye drops    * Acute ischemic left MCA stroke (CMS/HCC)  Assessment & Plan  The patient is a 78-year-old  female with a history of AAA, heart block s/p cardiac pacemaker, glaucoma, hyperlipidemia, coronary artery disease, NSTEMI, who presented to [facility] on June 26 after she has not been seen by her friends for a few days.   In the emergency department she was aphasic with right-sided weakness and hypertensive to the 190 systolic.  CT scan of the head revealed an acute infarct in left MCA with mild bleeding.  Echo showed an EF of  43% and a bubble study was negative.  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  Later this was transitioned to apixaban.        -Full program PT/OT/SLP/rehabilitation RN/psychology. Consult internal medicine for comorbidities.  -Continue apixaban  -Continue statin  -Sleep - melatonin and trazodone  -Safety - one-to-one                 The patient was discharged on 7/14/23. Jennifer will transition to outpatient therapies. Will need to follow up with PCP, Cardiology Dr. Lorenzo, neurology.      Day of Discharge    Physical Exam on day of discharge:  Physical Exam        General Appearance:          Alert, cooperative, no distress   Head:    Normocephalic,atraumatic   Eyes:    Conjunctiva clear      Lungs:     Clear to auscultation bilaterally, respirations unlabored   Heart:    Regular rate and rhythm, no murmur   Abdomen:     Soft, non-tender, bowel sounds active   Extremities:      Extremities normal, atraumatic, no cyanosis or edema             Skin:   No rashes or lesions   Neurologic:     Behavior/  Emotional:   Awake, alert, aphasic. Verbal output limited to incoherent sounds. Able to follow simple commands. + splint       Appropriate, cooperative         >30 minutes spent on discharge services.      Dayday Little MD  7/14/2023  10:53 AM

## 2023-07-14 NOTE — PLAN OF CARE
Problem: Rehabilitation (IRF) Plan of Care  Goal: Plan of Care Review  Outcome: Progressing  Flowsheets (Taken 7/14/2023 7525)  Progress: improving  Plan of Care Reviewed With: patient  Outcome Evaluation: Pt continent of bladder. 1:1 remains at bedside. All belongings packed for discharge to home with friend. Took her pills how in applesauce and still doesnt like the taste of the prevacid. Call bell within reach at all times.   Plan of Care Review  Plan of Care Reviewed With: patient  Progress: improving  Outcome Evaluation: Pt continent of bladder. 1:1 remains at bedside. All belongings packed for discharge to home with friend. Took her pills how in applesauce and still doesnt like the taste of the prevacid. Call bell within reach at all times.

## 2023-07-30 ENCOUNTER — HOSPITAL ENCOUNTER (EMERGENCY)
Facility: HOSPITAL | Age: 78
Discharge: HOME | End: 2023-07-30
Attending: EMERGENCY MEDICINE
Payer: MEDICARE

## 2023-07-30 ENCOUNTER — APPOINTMENT (EMERGENCY)
Dept: RADIOLOGY | Facility: HOSPITAL | Age: 78
End: 2023-07-30
Payer: MEDICARE

## 2023-07-30 VITALS
DIASTOLIC BLOOD PRESSURE: 59 MMHG | TEMPERATURE: 99 F | HEART RATE: 71 BPM | RESPIRATION RATE: 16 BRPM | OXYGEN SATURATION: 98 % | SYSTOLIC BLOOD PRESSURE: 121 MMHG

## 2023-07-30 DIAGNOSIS — R22.31 LOCALIZED SWELLING ON RIGHT HAND: Primary | ICD-10-CM

## 2023-07-30 PROCEDURE — 99283 EMERGENCY DEPT VISIT LOW MDM: CPT | Mod: 25

## 2023-07-30 PROCEDURE — 73130 X-RAY EXAM OF HAND: CPT | Mod: RT

## 2023-07-30 ASSESSMENT — ENCOUNTER SYMPTOMS: COLOR CHANGE: 0

## 2023-07-30 NOTE — ED PROVIDER NOTES
Emergency Medicine Note  HPI   HISTORY OF PRESENT ILLNESS     78-year-old female with past medical history of AAA, arthritis, hypertension, GERD, high cholesterol, ischemic cardiomyopathy, pacemaker, Raynaud's, RSD, CVA on Eliquis who presents with right hand swelling.  Patient had a stroke about a month ago with residual right-sided weakness.  Has worked with occupational therapy for the right arm.  Today she took a nap and when she woke up she noticed her right hand was heavier and more swollen than the left.  Denies any trauma.  Denies numbness, tingling, increasing weakness.            Patient History   PAST HISTORY     Reviewed from Nursing Triage:       Past Medical History:   Diagnosis Date   • AAA (abdominal aortic aneurysm) (CMS/[facility])    • Arthritis    • Elevated blood pressure reading without diagnosis of hypertension 4/19/2019   • Epistaxis 1/6/2020   • GERD (gastroesophageal reflux disease) 4/19/2019   • Glaucoma 4/19/2019   • Heart murmur    • Hiatal hernia    • History of hip replacement, total, right 4/19/2019    Right hip 9/ 2016 and revision 2017    • History of rheumatic fever as a child 4/19/2019   • Hypercholesterolemia 4/19/2019   • Ischemic cardiomyopathy 5/9/2019   • Kidney cysts    • Osteoarthritis of multiple joints 4/19/2019   • Osteoporosis    • Pacemaker 12/9/2019   • Raynaud's disease 4/19/2019   • RSD (reflex sympathetic dystrophy) 4/19/2019    Of left foot   • SOB (shortness of breath) 12/10/2019   • Status post insertion of drug eluting coronary artery stent 5/9/2019       Past Surgical History:   Procedure Laterality Date   • CHOLECYSTECTOMY OPEN     • CORONARY ANGIOPLASTY WITH STENT PLACEMENT  04/29/2019    of LAD   • CORONARY ANGIOPLASTY WITH STENT PLACEMENT  04/30/2019    of RCA   • DILATION AND CURETTAGE OF UTERUS     • EYE SURGERY      RIGHT CATARACT   • FOOT SURGERY Left    • JOINT REPLACEMENT Right 09/2016    total hip   • REVISION TOTAL HIP ARTHROPLASTY Right 2017   •  TONSILLECTOMY         Family History   Problem Relation Age of Onset   • Congenital heart disease Biological Mother         noted at autopsy   • Pulmonary fibrosis Biological Father    • Heart attack Paternal Grandfather        Social History     Tobacco Use   • Smoking status: Never   • Smokeless tobacco: Never   Vaping Use   • Vaping Use: Never used   Substance Use Topics   • Alcohol use: Not Currently   • Drug use: No         Review of Systems   REVIEW OF SYSTEMS     Review of Systems   Musculoskeletal:        Right hand swelling   Skin: Negative for color change.         VITALS     ED Vitals    Date/Time Temp Pulse Resp BP SpO2 [facility]   07/30/23 1834 37.2 °C (99 °F) 71 16 121/59 98 % W[facility]                       Physical Exam   PHYSICAL EXAM     Physical Exam  Vitals and nursing note reviewed.   Constitutional:       General: She is not in acute distress.  Eyes:      Conjunctiva/sclera: Conjunctivae normal.   Musculoskeletal:         General: Swelling present. No tenderness.      Comments: Fingers of the right hand are slightly more swollen than the left.  No tenderness palpation.  Normal range of motion.  Patient has some residual weakness on the right hand  compared to the left however has full range of motion actively.  Palpable radial pulse.   Neurological:      Mental Status: She is alert. Mental status is at baseline.   Psychiatric:         Behavior: Behavior normal.           PROCEDURES     Procedures     DATA     Results     None          Imaging Results          X-RAY HAND RIGHT 3+ VIEWS (Preliminary result)  Result time 07/30/23 19:24:36    Preliminary Interpretation    No obvious fracture or dislocation                              No orders to display       Scoring tools                                  ED Course & MDM   MDM / ED COURSE / CLINICAL IMPRESSION / DISPO     Medical Decision Making  Localized swelling on right hand: acute illness or injury  Amount and/or Complexity of Data  Reviewed  Radiology: ordered and independent interpretation performed.             Clinical Impression      Localized swelling on right hand     _________________     ED Disposition   Discharge                   Fina Coelho PA C  07/30/23 3053

## 2023-07-30 NOTE — DISCHARGE INSTRUCTIONS
Your x-ray shows no fracture and we are not concerned for infection   This is most likely dependent edema from positioning  Keep your arm elevated above your heart  Follow-up with your primary care doctor for further evaluation  Return to the ED at any time for worsening symptoms.

## 2023-07-31 NOTE — ED ATTESTATION NOTE
The patient was evaluated and managed by the physician assistant / nurse practitioner.  I agree.  I discussed the case with the PA who saw and evaluated the patient.       Aston Calzada MD  07/30/23 7117

## 2023-08-16 ENCOUNTER — HOSPITAL ENCOUNTER (OUTPATIENT)
Dept: PHYSICAL THERAPY | Age: 78
Setting detail: THERAPIES SERIES
Discharge: HOME | End: 2023-08-16
Attending: PHYSICAL MEDICINE & REHABILITATION
Payer: MEDICARE

## 2023-08-16 ENCOUNTER — HOSPITAL ENCOUNTER (OUTPATIENT)
Dept: OCCUPATIONAL THERAPY | Age: 78
Setting detail: THERAPIES SERIES
Discharge: HOME | End: 2023-08-16
Attending: PHYSICAL MEDICINE & REHABILITATION
Payer: MEDICARE

## 2023-08-16 ENCOUNTER — HOSPITAL ENCOUNTER (OUTPATIENT)
Dept: SPEECH THERAPY | Age: 78
Setting detail: THERAPIES SERIES
Discharge: HOME | End: 2023-08-16
Attending: PHYSICAL MEDICINE & REHABILITATION
Payer: MEDICARE

## 2023-08-16 DIAGNOSIS — I63.9 CEREBROVASCULAR ACCIDENT (CVA), UNSPECIFIED MECHANISM (CMS/HCC): ICD-10-CM

## 2023-08-16 DIAGNOSIS — I63.512 ACUTE ISCHEMIC LEFT MCA STROKE (CMS/HCC): ICD-10-CM

## 2023-08-16 DIAGNOSIS — I63.512 ACUTE ISCHEMIC LEFT MCA STROKE (CMS/HCC): Primary | ICD-10-CM

## 2023-08-16 DIAGNOSIS — R26.9 NEUROLOGIC GAIT DYSFUNCTION: Primary | ICD-10-CM

## 2023-08-16 PROCEDURE — 97163 PT EVAL HIGH COMPLEX 45 MIN: CPT | Mod: GP,KX

## 2023-08-16 PROCEDURE — 92523 SPEECH SOUND LANG COMPREHEN: CPT | Mod: GN

## 2023-08-16 PROCEDURE — 97112 NEUROMUSCULAR REEDUCATION: CPT | Mod: GO

## 2023-08-16 PROCEDURE — 97530 THERAPEUTIC ACTIVITIES: CPT | Mod: GP,KX

## 2023-08-16 PROCEDURE — 97166 OT EVAL MOD COMPLEX 45 MIN: CPT | Mod: GO

## 2023-08-16 RX ORDER — PANTOPRAZOLE SODIUM 40 MG/1
40 TABLET, DELAYED RELEASE ORAL DAILY
COMMUNITY
End: 2023-09-15 | Stop reason: SDUPTHER

## 2023-08-16 NOTE — PROGRESS NOTES
Physical Therapy Evaluation    [facility] OP Therapy Fax: [phone]    PT EVALUATION FOR OUTPATIENT THERAPY    Patient: Jennifer Sams    MRN: [id number]  : 1945 78 y.o.     Referring Physician: Liv Haywood, *  Date of Visit: 2023      Certification Dates:  23 through 23         Recommended Frequency & Duration:  2 times/week for up to 3 months     Diagnosis:   1. Neurologic gait dysfunction    2. Acute ischemic left MCA stroke (CMS/[facility])        Chief Complaints:   Chief Complaint   Patient presents with   • Other     See HPI       Precautions: limb restrictions, fall  Precautions additional comments: R body and spatial inattn,impulsivity/cognitive,  BLEEDING RISK-on elaquis; POA states no food restrictions    Past Medical History:   Past Medical History:   Diagnosis Date   • AAA (abdominal aortic aneurysm) (CMS/[facility])    • Arthritis    • Elevated blood pressure reading without diagnosis of hypertension 2019   • Epistaxis 2020   • GERD (gastroesophageal reflux disease) 2019   • Glaucoma 2019   • Heart murmur    • Hiatal hernia    • History of hip replacement, total, right 2019    Right hip 2016 and revision     • History of rheumatic fever as a child 2019   • Hypercholesterolemia 2019   • Ischemic cardiomyopathy 2019   • Kidney cysts    • MI (myocardial infarction) (CMS/HCC)    • Osteoarthritis of multiple joints 2019   • Osteoporosis    • Pacemaker 2019   • Raynaud's disease 2019   • RSD (reflex sympathetic dystrophy) 2019    Of left foot   • SOB (shortness of breath) 12/10/2019   • Status post insertion of drug eluting coronary artery stent 2019       Past Surgical History:   Past Surgical History:   Procedure Laterality Date   • CHOLECYSTECTOMY OPEN     • CORONARY ANGIOPLASTY WITH STENT PLACEMENT  2019    of LAD   • CORONARY ANGIOPLASTY WITH STENT PLACEMENT  2019    of RCA   • DILATION  AND CURETTAGE OF UTERUS     • EYE SURGERY      RIGHT CATARACT   • FOOT SURGERY Left    • JOINT REPLACEMENT Right 09/2016    total hip   • REVISION TOTAL HIP ARTHROPLASTY Right 2017   • TONSILLECTOMY           LEARNING ASSESSMENT    Assessment completed:  Yes    Learner name:  Jennifer    Learner: Patient    Learning Barriers:  Learning barriers: Visual and Cognitive    Preferred Language: English     Needed: No    Education Provided:   Method: Discussion and Demonstration  Readiness: acceptance  Response: Demonstrated understanding, Needs reinforcement and Verbalizes understanding      CO-LEARNER ASSESSMENT:    Completed: Yes:   Co-Learner name:  POA    Learner: Co-learner    Learning Barriers:  Learning barriers: No Barriers    Preferred Language: English     Needed: No    Education Provided:   Method:  Discussion and Demonstration  Readiness:   acceptance  Response:   Verbalizes understanding  Comments:           Welcome letter discussed: Yes Patient provided with Welcome Letter, which includes attendance policy. Provided education regarding cancellation and no-show policy. Education regarding the importance of participation and regular attendance to maximize goal attainment.         OBJECTIVE MEASUREMENTS/DATA:    Time In Session:  Start Time: 1313 (arrived late 2o poa just left work)  Stop Time: 1406  Time Calculation (min): 53 min   Assessment and Plan - 08/16/23 1723        Assessment    Plan of Care reviewed and patient/family in agreement Yes     Comments and POA     System Pathology/Pathophysiology Noted neuromuscular;musculoskeletal     Functional Limitations in Following Categories (PT Eval) self-care;home management;community/leisure     Rehab Potential/Prognosis good, to achieve stated therapy goals     Problem List decreased endurance;decreased strength;impaired cognition;impaired motor control;inability to direct their own care;impaired sensation;impaired coordination;impaired  balance;hemiparesis/hemiplegia;impaired communication;impaired postural control     Actions taken CM-no longer with 24/7 supervision and qualifies as a fall risk     Clinical Assessment Ms Sams presents to OPPT with gross motor coordination deficits RLE, UMN signs including hyperreflexia at R patella, no asymmetries in tone per MAS, reporting weakness and cold perception of sensation in RUE/RLE, decreased motor control RUE>RLE, symmetrical strength per MMT via demo cues 2o apraxia, unable to assess subjective surveys 2o aphasia, balance deficits qualifying pt as a fall risk per FGA.  Pt demonstrating cognitive deficits that affect pt's safety/insight/judgement c/w R body and spatial inattn and no longer has 24/7 S for safety and no medalert at home.  Pt would benefit from S for all mobiltiy/transfers and OPPT at 2x/wk xup to 90 d course of care (anticipating 8wks) to optimize functional potential and reduce fall risk.     Plan and Recommendations NV-start nustep for CV conditioning vs 6MWT, 10MWT, BBS     Planned Services CPT 72272 Gait training;CPT 72716 Manual therapy;CPT 30577 Neuromuscular Reeducation;CPT 92863 Orthotics training (Initial encounter);CPT 08274 Therapeutic activities;CPT 11005 Therapeutic exercises;CPT 70899 Electrical stimulation UNATTENDED;CPT 94270 Hot/Cold Packs therapy                General Information - 08/16/23 1316        Session Details    Document Type initial evaluation     Mode of Treatment individual therapy        General Information    Onset of Illness/Injury or Date of Surgery 07/10/23     Referring Physician Dr Haywood     History of present illness/functional impairment 78y.o RHD female was referred by PMR after DC home from rehab 7/14.  Pt was admitted to [facility] Monday 6/26/23 after she has not been seen by her friends for a few days.  POA states pt was clearly speaking with another friend on Saturday night.  No contact until Monday afternoon when a friend found pt  upstairs walking around, agitated, confused, combative per EMSwith RUE/RLE hemiplegia, expressive aphasia.  EMS transported to  with aphasic with right-sided weakness and hypertensive to the 190 systolic.   CT Head (+) acute infarct in left MCA infarct w/3mm midline shift with mild bleeding.  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  Pt was transferred to [facility] 7/1->7/14 then transitioned home with home care and now transitioning to OP services.  Upon DC from [facility], was DC home with 24/7 S via friend from [place] but person flew back home Wed 8/9, now with intermittent S via POA, looking into home health assistance for intermittent S.  Pt doesn't have a medalert system but POA looking into a phone with faces to help pt call for emergencies.  Pt's POA states home health care mgmt is helping POA with [employer] but pt is non amenable to having someone in home.     Limitations/Impairments safety/cognitive;insensate body part;sensory     Patient/Family/Caregiver Comments/Observations [facility] who also attended session to assist with history and communication 2o aphasia.     Existing Precautions/Restrictions limb restrictions;fall     Precautions comments R body and spatial inattn,impulsivity/cognitive,  BLEEDING RISK-on elaquis; POA states no food restrictions                Pain/Vitals - 08/16/23 1316        Pain Assessment    Currently in pain No/Denies        Pre Activity Vital Signs    Pulse 64     /64     BP Location Left upper arm     BP Method Manual     Patient Position Sitting                Falls/Food Screening - 08/16/23 1316        Initial Falls Assessment    One or more falls in the last year No        Food Insecurity    Within the past 12 months, you worried that your food would run out before you got the money to buy more. Never true     Within the past 12 months, the  food you bought just didn't last and you didn't have money to get more. Never true                PT - 08/16/23 1316        Physical Therapy    Physical Therapy Specialty Traditional Neuro PT        PT Plan    Frequency of treatment 2 times/week     PT Duration 3 months     PT Cert From 08/16/23     PT Cert To 11/14/23     Date PT POC was sent to provider 08/16/23     Signed PT Plan of Care received?  No                   Living Environment    Living Environment - 08/16/23 1316        Living Environment    People in Home alone   with a cat, loan    Living Arrangements house     Living Environment Comment 2SH, B/B 2nd flr-tub shower with shower chair and grabbars in shower, FF stairs to 2nd flr w/L ascending rail, no OK.  no DARÍO.     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  Driving.  Retired 5yr ago after MI on day of correction.  Loves her cat, Loan.  Enjoys going out to eat, visit with friends and neighbors.               DTR    Deep Tendon Reflexes - 08/16/23 1316        Deep Tendon Reflexes    Left Patellar Reflex 2-->average, normal     Right Patellar Reflex 3-->brisker than average               ROM     MMT     Transfers    Transfers - 08/16/23 1316        Bed Mobility    Bed Mobility Activities sit to supine;supine to sit;rolling     Comment pt refused rolling in bed 2o apprhension of R THR.  supine<->sit via log roll transitions mod I        Sit to Stand Transfer    [place], Sit to Stand Transfer modified independence        Stand to Sit Transfer    [place], Stand to Sit Transfer modified independence        Stand Pivot Transfer    [place], Stand Pivot/Stand Step Transfer modified independence     Safety/Cues impulsivity               Gait and Mobility     Neuromuscular/Motor    Neuromuscular/Motor - 08/16/23 1316        Motor Skills    Motor  Skills coordination;muscle tone     Comment: Coordination disdiadochokinesia BHARATH at ankles.  apraxia limiting ankle cirles and heel-shin     Comment: Muscle Tone MAS: 0 B/L add/HS/QS/PFors, no clonus               Outcome Measures    PT Outcome Measures - 08/16/23 1723        Objective Outcome Measures    FGA Score (out of 30 total) 18                  Goals     •  Mutually agreed upon pain goal (pt-stated)       Mutually agreed upon pain goal: to remain pain-free      •  Patient specific goal-PT       Patient specific goal (confirmed via POA) to return to optimal safety to be alone w/cat in 2SH, to get stronger, to get the right side to work      •  PT-LMCA infarct       Short Term Goals Time Frame Result Comment/Progress   Patient will be free of falls. 4wks     Patient will perform functional transfers independently.      Patient will perform stair negotiation safely w/rail 4wks     Pt will achieve 5xSTS within 15s with maintaining proper /neutral hip alignmt w/o R adduction. 4wks     Pt will negotiate FF stairs w/1 rail with S via reciprocal pattern, cues for safety. 4wks     Pt will demonstrate improved static standing balance via Gutierrez >/= 7 pts for MDC. 4wks     Pt will demonstrate improved dynamic balance and decreased risk for falls via FGA >/= 21/30. 4wks     Progress functional endurance via 2MWT to >/= TBD / MDC of 69ft, w/o LOB 4wks     Progress comfortable gait speed by 0.10m/s to achieve MDC. 4wks     Pt will be mod I with updated HEP 4wks     Pt will achieve WNL on SOT to demonstrate no balance system asymmetries. 4wks     Pt will achieve gain to 5/5 MMT BLE to improve ease of functional mobility. 4wks       Long Term Goals Time Frame Result Comment/Progress   Patient will be free of falls. 8-12wks     Patient will be independent for functional mobility w/in the community. 8-12wks     Patient will perform functional and floor transfers independently. 8-12wks     Pt will achieve 5xSTS within 11s with  maintaining proper /neutral hip alignmt w/o R adduction. 8-12wks     Pt will negotiate FF stairs w/1 rail with mod I via reciprocal pattern, cues for safety. 8-12wks     Pt will demonstrate improved static standing balance via Gutierrez >/= 52 to lessen fall risk. 8-12wks     Pt will demonstrate improved dynamic balance and decreased risk for falls via FGA >/= 25/30. 8-12wks     Progress functional endurance to be able to achieve age norm for 6MWT of 1---ft. 8-12wks     Progress comfortable gait speed to 1.--m/s to achieve age norm. 8-12wks     Pt will be mod I with updated HEP 8-12wks     Pt will achieve WNL on SOT to demonstrate no balance system asymmetries. 8-12wks     Pt will achieve gain to 5/5 MMT BLE to improve ease of functional mobility. 8-12wks                    TREATMENT PLAN:    Neuro Flowsheet    Diagnosis  Acute L MCA infarct    Precautions  BLEEDING RISK-on elaquis  FALLS  apraxia  R body/spatial inattn  impulsivity    PT Initial Evaluation  8/16/2023    Progress Note      POC Rx  2x/wk x90d    Ins Limitations      PT Neuro Exercises  Current Session Time   THER ACT   59764 y/n TOTAL TIME FOR SESSION 8-22 Minutes   Subjective OUTCOME MEASURES     ABC     PSFS     Objective OMs     impairmt based testing y 8/16 IE: see assessment- GMC, MAS, DTR, MMT - via demo cues for motor task perfomance   5xSTS     TUG     10MWT      6MWT     Neuro Oms:     miniBEST     mCTSIB / SOT     BBS     FGA y 8/16 IE: 18/30 =fall risk   VS, falls review, medication changes y    Patient Education  y IE: role of OPPT to address post-infarct deficits including inattn, impairment-based deficits, functional deficits, explanation of spasticity and purpose of extremity synergies, goal to isolate movt.      Attendance policy provided including recommendations for prescribed dosage of care to achieve pt goals to resume baseline function.  Pt verbalized understanding.   HEP LOG y NV   Functional Transfers  Bed Mobility   Roll Left Pt  declined   Roll Right Pt declined 2o fear of injury to R hip   Supine to Sit Mod I via log roll   Sit to Supine Mod I via log roll   Comment (Bed Mobility)    Transfers   Sit to Stand Transfer Mod I w/o AD   Assistive Device    Comment    Stand to Sit Transfer Mod I w/o AD   Assistive Device    Comment    Stand Pivot/Stand Step Transfer Mod I w/o AD   Assistive Device    Comment    Gait Training   Level of A, cues S 2o R inattn, decreased visual scanning of environmt, impulsivity   Assistive Device W/o AD   Distance in Feet 100   Pattern (Gait) No gross asymmetries, persistent downward gaze   Comment (Gait/Stairs)    Stairs Training   Level of A, cues CS    Assistive Device L rail to ascend/L rail to descend-pt unable to motor plan to utilize rail similar to home 2o apraxia   Handrail Location (Stairs)    Number of Stairs 4   Ascending Stairs Technique reciprocally   Descending Stairs Technique reciporocally   Comment    Mobility Belt   Mobility Belt Used for All Out of Bed Activity y      Floor Transfers     THER EX  79614  TOTAL TIME FOR SESSION Not performed   CARDIOVASCULAR     nustep     Stationary bike     treadmill          STRETCHING          STRENGTHENING     supine     sitting     standing               NEURO RE-ED  98843  TOTAL TIME FOR SESSION Not performed   Neuro [place]ms (provides tx in addition to IE)     Developmental posturing          BALANCE TRAINING      SEATED     STATIC STANDING balance               DYNAMIC STANDING balance               GAIT TRAINING  05445  TOTAL TIME FOR SESSION Not performed   Gait training     Curb negotiation     Stairs     MANUAL  11753  TOTAL TIME FOR SESSION Not performed   MODALITIES  CP/MHP 94012 / Estim 28154)  TOTAL TIME FOR SESSION Not performed           ASSESSMENT:    This 78 y.o. year old female presents to PT with above stated diagnosis. Physical Therapy evaluation reveals decreased endurance, decreased strength, impaired cognition, impaired motor control,  inability to direct their own care, impaired sensation, impaired coordination, impaired balance, hemiparesis/hemiplegia, impaired communication, impaired postural control resulting in self-care, home management, community/leisure limitations. Jennifer Sams will benefit from skilled PT services to address limitation, work towards rehab and patient goals and maximize PLOF of chosen ADLs.     Planned Services: The patient's treatment will include CPT 31714 Gait training, CPT 70241 Manual therapy, CPT 79923 Neuromuscular Reeducation, CPT 73130 Orthotics training (Initial encounter), CPT 76015 Therapeutic activities, CPT 41082 Therapeutic exercises, CPT 57758 Electrical stimulation UNATTENDED, CPT 83807 Hot/Cold Packs therapy, .     Ana Calderon, PT

## 2023-08-16 NOTE — OP PT TREATMENT LOG
Neuro Flowsheet    Diagnosis  Acute L MCA infarct    Precautions  BLEEDING RISK-on elaquis  FALLS  apraxia  R body/spatial inattn  impulsivity    PT Initial Evaluation  8/16/2023    Progress Note      POC Rx  2x/wk x90d    Ins Limitations      PT Neuro Exercises  Current Session Time   THER ACT   98721 y/n TOTAL TIME FOR SESSION 8-22 Minutes   Subjective OUTCOME MEASURES     ABC     PSFS     Objective OMs     impairmt based testing y 8/16 IE: see assessment- GMC, MAS, DTR, MMT - via demo cues for motor task perfomance   5xSTS     TUG     10MWT      6MWT     Neuro Oms:     miniBEST     mCTSIB / SOT     BBS     FGA y 8/16 IE: 18/30 =fall risk   VS, falls review, medication changes y    Patient Education  y IE: role of OPPT to address post-infarct deficits including inattn, impairment-based deficits, functional deficits, explanation of spasticity and purpose of extremity synergies, goal to isolate movt.      Attendance policy provided including recommendations for prescribed dosage of care to achieve pt goals to resume baseline function.  Pt verbalized understanding.   HEP LOG y NV   Functional Transfers  Bed Mobility   Roll Left Pt declined   Roll Right Pt declined 2o fear of injury to R hip   Supine to Sit Mod I via log roll   Sit to Supine Mod I via log roll   Comment (Bed Mobility)    Transfers   Sit to Stand Transfer Mod I w/o AD   Assistive Device    Comment    Stand to Sit Transfer Mod I w/o AD   Assistive Device    Comment    Stand Pivot/Stand Step Transfer Mod I w/o AD   Assistive Device    Comment    Gait Training   Level of A, cues S 2o R inattn, decreased visual scanning of environmt, impulsivity   Assistive Device W/o AD   Distance in Feet 100   Pattern (Gait) No gross asymmetries, persistent downward gaze   Comment (Gait/Stairs)    Stairs Training   Level of A, cues CS    Assistive Device L rail to ascend/L rail to descend-pt unable to motor plan to utilize rail similar to home 2o apraxia   Handrail  Location (Stairs)    Number of Stairs 4   Ascending Stairs Technique reciprocally   Descending Stairs Technique reciporocally   Comment    Mobility Belt   Mobility Belt Used for All Out of Bed Activity y      Floor Transfers     THER EX  82880  TOTAL TIME FOR SESSION Not performed   CARDIOVASCULAR     nustep     Stationary bike     treadmill          STRETCHING          STRENGTHENING     supine     sitting     standing               NEURO RE-ED  30749  TOTAL TIME FOR SESSION Not performed   Neuro Oms (provides tx in addition to IE)     Developmental posturing          BALANCE TRAINING      SEATED     STATIC STANDING balance               DYNAMIC STANDING balance               GAIT TRAINING  62677  TOTAL TIME FOR SESSION Not performed   Gait training     Curb negotiation     Stairs     MANUAL  05430  TOTAL TIME FOR SESSION Not performed   MODALITIES  CP/MHP 71412 / Estim 28424)  TOTAL TIME FOR SESSION Not performed

## 2023-08-16 NOTE — LETTER
Dear DR. Haywood,    Thank you for this referral. Please review the attached notes and plan of care for your approval.  Please contact our department with any questions.     Sincerely,     Magnolia Lundy, CCC-SLP  [address] [place] [address]     By co-signing this Plan of Care (POC) you agree to the following:  I have reviewed the the Plan of Care established by the therapist within this document and certify that the services are skilled and medically necessary. I have reviewed the plan and recommend that these services continue to meet the goals stated in this document.    PHYSICIAN SIGNATURE: __________________________________     DATE: ___________________  TIME: _____________           Speech Therapy Plan of Care 23   Effective from: 2023  Effective to: 2023    Plan ID: [id number]                Participants as of 2023      Name Type Comments Contact Info    Liv Haywood MD Referring Provider  [phone]    Magnolia Lundy CCC-SLP Speech Language Pathologist             Last Progress Notes Note       Author: Magnolia Lundy CCC-SLP Status: Signed Last edited: 2023  3:00 PM         Speech-Language Pathology Evaluation    [facility] Fax: [phone]    SLP EVALUATION FOR OUTPATIENT THERAPY    Patient: Jennifer Sams     MRN: [id number]  : 1945 78 y.o.    Referring Physician: Liv Haywood, *  Date of Visit: 2023    Certification Dates:  23 through 23    Recommended Frequency & Duration:  2 times/week for up to 3 months   PN: 2023    Diagnosis:   1. Acute ischemic left MCA stroke (CMS/HCC)    2. Cerebrovascular accident (CVA), unspecified mechanism (CMS/HCC)        Chief Complaints:   Chief Complaint   Patient presents with   • Speech Problem     Apraxia, Aphasia, Dysphagia, reading       Precautions:      Past Medical History:   Past Medical History:   Diagnosis Date   • AAA (abdominal aortic  aneurysm) (CMS/[facility])    • Arthritis    • Elevated blood pressure reading without diagnosis of hypertension 04/19/2019   • Epistaxis 01/06/2020   • GERD (gastroesophageal reflux disease) 04/19/2019   • Glaucoma 04/19/2019   • Heart murmur    • Hiatal hernia    • History of hip replacement, total, right 04/19/2019    Right hip 9/ 2016 and revision 2017    • History of rheumatic fever as a child 04/19/2019   • Hypercholesterolemia 04/19/2019   • Ischemic cardiomyopathy 05/09/2019   • Kidney cysts    • MI (myocardial infarction) (CMS/[facility])    • Osteoarthritis of multiple joints 04/19/2019   • Osteoporosis    • Pacemaker 12/09/2019   • Raynaud's disease 04/19/2019   • RSD (reflex sympathetic dystrophy) 04/19/2019    Of left foot   • SOB (shortness of breath) 12/10/2019   • Status post insertion of drug eluting coronary artery stent 05/09/2019       Past Surgical History:   Past Surgical History:   Procedure Laterality Date   • CHOLECYSTECTOMY OPEN     • CORONARY ANGIOPLASTY WITH STENT PLACEMENT  04/29/2019    of LAD   • CORONARY ANGIOPLASTY WITH STENT PLACEMENT  04/30/2019    of RCA   • DILATION AND CURETTAGE OF UTERUS     • EYE SURGERY      RIGHT CATARACT   • FOOT SURGERY Left    • JOINT REPLACEMENT Right 09/2016    total hip   • REVISION TOTAL HIP ARTHROPLASTY Right 2017   • TONSILLECTOMY         LEARNING ASSESSMENT    Assessment completed: Yes    Learner name:  Thelma    Learner: Other friend    Learning Barriers:  Learning barriers: No Barriers    Preferred Language: English     Needed: No    Education Provided:   Method: Discussion and Demonstration  Readiness: acceptance  Response: Verbalizes understanding      CO-LEARNER ASSESSMENT:    Completed: Yes:   Co-Learner name:  Jennifer Sams    Learner: Patient    Learning Barriers:  Learning barriers: Language    Preferred Language: English     Needed: No    Education Provided:   Method:  Discussion and Demonstration  Readiness:    acceptance  Response:   Needs reinforcement  Comments: Aphasia,Apraxia    Welcome letter discussed: Yes Patient provided with Welcome Letter, which includes attendance policy. Provided education regarding cancellation and no-show policy. Education regarding the importance of participation and regular attendance to maximize goal attainment.       OBJECTIVE MEASUREMENTS/DATA:    Session Time:  Start Time: 1500  Stop Time: 1600  Time Calculation (min): 60 min   Assessment - 08/16/23 2100          Assessment    Plan of Care reviewed and patient/family in agreement Yes     Comments Discussed plan of care for 3 months with monthly progress reports to target motor speech with consideration of reading and auditory stimuli     Rehab Potential/Prognosis (SLP) adequate, monitor progress closely     Problem List Impaired communication;Motor control impaired     Clinical Assessment Ms. Sams a 79 yo female presents [facility] for an evaluation of present levels of communication as a result of CVA on June 26, 2023.  Ms Sams exhibits severe communcation impairments characterized by severe apraxia of speech, audiitory comprehension, reading and writing deficits.  Auditory and reading comprehension will be further assessed at first follow up.  Ms Wells can be observed with limited intelligible speech noted with intact prosodic variation, islands of intelligible words and phrases, severely impaired imitative and self directed speech patterns.  Repetiton was limited to tense vowls and minimal bilabials.  Ms. Sams will benefit from speech therapy for motor speech production at the phoneme, syllable and simple word and phrase level with consistent stimuli for auditory comprehension and reading.     Plan and Recommendations Complete select auditory comprehension subtests and reading subtests.  Initiate bialbial and vowel production. Establish and practice bombardment of functional word, phrase list.     Planned Services  CPT 97822 Speech Lang Voice Comm and/or Auditory Proc (Treatment of speech, language, voice, communication and/or hearing processing disorder)                    General Information - 08/16/23 1505          Session Details    Document Type initial evaluation     Mode of Treatment individual therapy        General Information    Onset of Illness/Injury or Date of Surgery 06/26/23     Referring Physician Dr. Haywood     History of present illness/functional impairment Jennifer is a 78 y.o. female who presented to [facility] on June 26 after she has not been seen by her friends for a few days.  Patient lives alone and when she was found by EMS she was confused and combative.  In the emergency department she was aphasic with right-sided weakness and hypertensive to the 190 systolic.  CT scan of the head revealed an acute infarct in left MCA with mild bleeding.  Echo showed an EF of 43% and a bubble study was negative.  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 Pt transferred to  [facility] on 7/1/2023 Principal problem  Acute ischemic left MCA stroke (CMS/HCC). PMH AAA, heart block s/p cardiac pacemaker, glaucoma, hyperlipidemia, coronary artery disease, She was evaluated by speech therapy and cleared for a soft and bite-size diet with moderately thick liquids.  7/2/2023:  Diet SB6 with mildly thick liquids (MT2)  7/6/2023: Upgraded to EC7 with thin liquids.  Pt discharged home with 24 hr care and home health services recieving speech therapy for motor speech and aphasia.     Limitations/Impairments other (see comments)     Interventions communication.  Explore written, nonverbal and low tech AAC to aid in communication.     Patient/Family/Caregiver Comments/Observations Pt friend/ex-coworker present for therapy session to assist in history intake.                    Pain and Vitals - 08/16/23 1505          Pain Assessment    Currently in pain No/Denies                    SLP -  "08/16/23 1505          Speech Therapy    Speech Therapy Specialty Traditional Neuro Program ST        SLP Frequency and Duration    Frequency of treatment 2 times/week     Speech Duration 3 months     SLP Custom Frequency and Duration PN: 9/16/2023     SLP Cert From 08/16/23     SLP Cert To 11/14/23     Date SLP POC was sent to provider 08/16/23     Signed SLP Plan of Care received?  No                    Falls Assessment/Food Screening - 08/16/23 1505          Initial Falls Assessment    One or more falls in the last year No        Food Insecurity    Within the past 12 months, you worried that your food would run out before you got the money to buy more. Never true     Within the past 12 months, the food you bought just didn't last and you didn't have money to get more. Never true                     Living Environment    Living Environment - 08/16/23 1505          Living Environment    People in Home alone   [employer] [place]    Prior Level of Function - 08/16/23 1505          OTHER    Previous level of function Independent ADL/IADLs, driving, lived alone entire life, finances, retired  to [employer]                   Language Assessment     Language Assessment - 08/18/23 1142          Verbal Expression    Automatic Speech (Verbal Expression) unable/difficult to assess     Confrontational Naming (Verbal Expression) unable/difficult to assess     Repetition Skills (Verbal Expression) unable/difficult to assess;other (see comments)   see motor speech section for phoneme imitation    Narrative Speech (Verbal Expression) unable/difficult to assess     Conversational Speech (Verbal Expression) phrase level;word level;other (see comments)   small \"islands\" of words and phrases appear, but unreliable for effective commuication.    Comment, Assessment (Verbal Expression) small \"islands\" of words and phrases appear, but unreliable for effective commuication.     Verbal Expression Intervention " "automatic speech;confrontational naming;word level     Comment, Intervention (Verbal Expression) Pair motor speech tasks with word retrieval and reading.        Auditory Comprehension    Follows Commands (Auditory Comprehension) not tested;other (see comments)   TBA    Yes/No Questions (Auditory Comprehension) not tested   TBA    Paragraph Comprehension (Auditory Comprehension) not tested   TBA    Picture Identification (Auditory Comprehension) field of 2 pictures;field of 4 pictures     Comment, Assessment (Auditory Comprehension) BDAE: Basic word discrimination F2 and F4: 29/37, 20%tile.  Noted difficulty in areas of body parts, letter and digit identification.     Comment, Intervention (Auditory Comprehension) SLP to complete Assessment of Auditory comprehension at first follow up: Following commands and Complex Ideational material  with Y/N to sentences and paragraphs.                   Reading and Writing    Reading and Writing - 08/18/23 1142          Written Language    Comment, Assessment (Written Language) SLP to defer to OT evaluation and treatment. Per OT word level moderate impairment. Pt able to copy words with non-dominant (left) hand.  Pt spontaneously wrote name \"Sharlene\".  Attempt to write on X2 additional occasions noted with letter form and recognition, however word was unidentifiable. (VIKY Luque)        Reading Comprehension    Oral Reading Ability (Reading Comprehension) word level     Word Level, Oral Reading Ability (Reading Comprehension) minimal impairment;other (see comments)   Supervision to manage impulsivity.    Comprehension Level (Reading Comprehension) word level tasks     Word, Comprehension Level (Reading Comprehension) minimal impairment     Functional Reading Tasks (Reading Comprehension) not tested        Reading and Writing Assessment    Reading and Writing Comments: SLP to address reading to support motor speech home practice and cues.                   Motor Speech    Motor " "Speech - 08/18/23 1142          Motor Speech Characteristics    Speech Intelligibility words with less than 25% intelligibility        Motor Speech    Speech Intelligibility (Motor Speech) word level;phrase/sentence level     Word Level, Speech Intelligibility (Motor Speech) severe impairment;unable/difficult to assess     Phrase/Sentence Level, Speech Intelligibility (Motor Speech) severe impairment;unable/difficult to assess;other (see comments)   small \"islands\" of words and phrases appear, but unreliable for effective commuication.    Speech Fluency (Motor Speech) sound/syllable repetitions;unable/difficult to assess   Provided direct model with visual PROMPT cues pt was able to produce in isolation,/m,w/ and 5/5 tense vowels.  Pt was unable to produce /p, b, f, v/and 0/5 lax vowels.    Vocal Loudness (Motor Speech) WNL     Breath Support (Motor Speech) intact     Rate/Prosody (Motor Speech) rapid rate     Resonance (Motor Speech) WNL     Comment, Motor Speech Assessment Severe motor speech (Acquired Apraxia of Speech) noted with poor imitation at the single phoneme level with direct and mult modal modeling.  Pt noted with intelligible islands of speech appropriate to topic.  Examples of spontaneous speech this date. \"Oh Thank you, No,  What? nothing, when, where, Thelma, picky, shut up,yesterday\"     Comment, Motor Speech Intervention Therapy will target phonemes in isolation anterior to posterior, tense vowels, VC,CV, CVCV syllable combinations with supported written stimulus with consideration of reading. Auditory comprehension for following commands for motor speech to be further assessed.                   OM: FCM/Voice/Neuro    Outcome Measures - 08/16/23 2972          Functional Communication Measures    FCM: Motor Speech 2-->Level 2   Goal L4    FCM: Reading 3-->Level 3   Goal: maintain 3, indirect therapy, improve to 4.    FCM: Spoken Language, Comprehension --   TBD, suspect L2, goal, indirect therapy-4 "    FCM: Spoken Language, Expression 2-->Level 2                     TREATMENT PLAN:        BDAE  Eval: 8/18/2023      Aphasia Severity Rating Scale 1/5    CONVERSATIONAL and EXPOSITORY SPEECH       Simple Social Responses Raw Score:   Percentiles:    Complexity Index  Raw Score:    Ratio:   Percentiles:     Narrative Discourse  Short form Raw Score:   Ratio:   Percentiles:     AUDITORY COMPREHEN     Basic Word Discrimination    Commands  Standard Form     Complex Ideational Material  Standard Form             Raw Score: 29/37  Percentiles:20%     Raw Score:   Percentiles:      Raw Score:   Percentiles:                             ARTICULATION  Agility              ORAL EXPRESSION  Recitation, Yolis, Rhythm              Automatized Sequences  Standard Form   Recitation: 0  Percentiles: 30%tile  Yolis: 1  Percentiles: 30%tile  Rhythm: 1  Percentiles: 60%tile     Raw Score: 0/8  Percentiles: 0%tile       REPETITION  Words  Standard Form     Repetition of Nonsense words     Sentences  Standard Form    Raw Score: 0  Percentiles:0 %tile     Raw Score:  Percentiles:      Raw Score:  Percentiles:                          NAMING  Responsive Naming  Standard Form     [place] Naming Test  Standard Form    Raw Score:   Percentiles:      Raw Score:   Stim cue:   PC:   MC:   Percentiles:   Mean:                 READING  Match case and scripts    Number matching    Picture-Word Matching    Oral Word Reading    Oral Sentence Reading    Oral Sentence Comp    Sentence/Paragraph    Comprehension   Raw Score: 7  Percentiles: 40%tile  Raw Score:   Percentiles:   Raw Score: 10  Percentiles: 100%tile  Raw Score:   Percentiles:   Raw Score:   Percentiles:   Raw Score:   Percentiles:   Raw Score:   Percentiles:   Raw Score:   Percentiles:      WRITING  Form    Letter Choice    Motor Facility    Primer Words    Regular Phonics    Common Irregular Words    Written Picture Naming    Narrative Writing    Raw Score:   Percentiles:   Raw Score:    Percentiles:   Raw Score:   Percentiles:   Raw Score:   Percentiles:   Raw Score:   Percentiles:   Raw Score:   Percentiles:   Raw Score:   Percentiles:   Raw Score:   Percentiles:           IE: 2023   Rating Scale Profile of Speech Characteristics - scale   Articulatory Agility-phoneme and syllable level    Phrase Length: longest word runs    Grammatical Form: Variety and use of morphemes    Melodic Line (Prosody)    Paraphasia in Running Speech     Word Finding Relative to Fluency    Sentence Repetition    Auditory Comprehension tbd      Aphasia Severity Ratin/5-         GOALS:     Goals       • SLP Motor Goals         Time Frame  Result  Comment/Progress    Will improve FCM in the following:  MOTOR SPEECH  Current:  Goal:   LTG- 12 w     Demonstrate effective speech intelligibility to communicate at single word and functional phrase level with (min A) use of compensatory strategies with familiar listeners  LTG- 12 w     Pt will produce bilabials /p, b, m, w/, tip alveolars /t, d, n/ and tense vowels /a, e, I, o,u/ in isolation provided mod A, 80% of trials.  STG-4w        Pt will produce VC and CV syllables provided the above phonemes, Mod A, 80% of trials STG- 6-8 w        Pt will produce single and bisyllablic words consisting of CVCV, VCV and CVC, Mod A, 80% of presentations.  STG 8-10        Pt will produce simple automatics provided mod A:  1-10  A-G  Yolis and identifiable syllable combinations for MALLY  STG- 8-10       Pt will produce small set of functional words/phrases in unison or in imitation with SLP, then produce 3  successive attempts with 80% accuracy  STG 8-10        Auditory Comprehension and reading to be further assessed for determination of goals to support speech production                    FCM: MOTOR SPEECH:  L3:  The communication partner must assume primary responsibility for interpreting the communication exchange, however the individual is able to  produce short consonant-vowel combinations or automatic words intelligibly. With consistent MODERATE cueing, the individual can produce simple words and phrases intelligibly, although accuracy may vary.                   EVALUATION AND ASSESSMENT:     Assessment - 08/16/23 2100          Assessment    Plan of Care reviewed and patient/family in agreement Yes     Comments Discussed plan of care for 3 months with monthly progress reports to target motor speech with consideration of reading and auditory stimuli     Rehab Potential/Prognosis (SLP) adequate, monitor progress closely     Problem List Impaired communication;Motor control impaired     Clinical Assessment Ms. Sams a 77 yo female presents [facility] for an evaluation of present levels of communication as a result of CVA on June 26, 2023.  Ms Sams exhibits severe communcation impairments characterized by severe apraxia of speech, audiitory comprehension, reading and writing deficits.  Auditory and reading comprehension will be further assessed at first follow up.  Ms Wells can be observed with limited intelligible speech noted with intact prosodic variation, islands of intelligible words and phrases, severely impaired imitative and self directed speech patterns.  Repetiton was limited to tense vowls and minimal bilabials.  Ms. Sams will benefit from speech therapy for motor speech production at the phoneme, syllable and simple word and phrase level with consistent stimuli for auditory comprehension and reading.     Plan and Recommendations Complete select auditory comprehension subtests and reading subtests.  Initiate bialbial and vowel production. Establish and practice bombardment of functional word, phrase list.     Planned Services CPT 19385 Speech Lang Voice Comm and/or Auditory Proc (Treatment of speech, language, voice, communication and/or hearing processing disorder)                 Magnolia Lundy, CCC-SLP                      Current Participants as of 8/18/2023      Name Type Comments Contact Info    Liv Haywood MD Referring Provider  [phone]    Signature pending    aMgnolia Lundy CCC-SLP Speech Language Pathologist      Electronically signed by Magnolia Lundy CCC-SLP at 8/18/2023 0937 EDT

## 2023-08-17 NOTE — PROGRESS NOTES
Occupational Therapy Evaluation    [facility] [place] [facility] Fax: [phone]    OT EVALUATION FOR OUTPATIENT THERAPY    Patient: Jennifer Sams   MRN: [id number]  : 1945 78 y.o.    Referring Physician: Liv Haywood, *  Date of Visit: 2023    Certification Dates:  23 through 23    Recommended Frequency & Duration:  2 times/week for up to 3 months        Diagnosis:   1. Acute ischemic left MCA stroke (CMS/[facility])        History of Present Illness:  See below    Chief Complaints: No chief complaint on file.      Precautions:      Past Medical History:   Past Medical History:   Diagnosis Date   • AAA (abdominal aortic aneurysm) (CMS/[facility])    • Arthritis    • Elevated blood pressure reading without diagnosis of hypertension 2019   • Epistaxis 2020   • GERD (gastroesophageal reflux disease) 2019   • Glaucoma 2019   • Heart murmur    • Hiatal hernia    • History of hip replacement, total, right 2019    Right hip 2016 and revision     • History of rheumatic fever as a child 2019   • Hypercholesterolemia 2019   • Ischemic cardiomyopathy 2019   • Kidney cysts    • MI (myocardial infarction) (CMS/[facility])    • Osteoarthritis of multiple joints 2019   • Osteoporosis    • Pacemaker 2019   • Raynaud's disease 2019   • RSD (reflex sympathetic dystrophy) 2019    Of left foot   • SOB (shortness of breath) 12/10/2019   • Status post insertion of drug eluting coronary artery stent 2019       Past Surgical History:   Past Surgical History:   Procedure Laterality Date   • CHOLECYSTECTOMY OPEN     • CORONARY ANGIOPLASTY WITH STENT PLACEMENT  2019    of LAD   • CORONARY ANGIOPLASTY WITH STENT PLACEMENT  2019    of RCA   • DILATION AND CURETTAGE OF UTERUS     • EYE SURGERY      RIGHT CATARACT   • FOOT SURGERY Left    • JOINT REPLACEMENT Right 2016    total hip   • REVISION TOTAL HIP ARTHROPLASTY Right 2017   •  "TONSILLECTOMY         LEARNING ASSESSMENT    Assessment completed: Yes    Learner name:  Jennifer \"Sharlene\" Latrell     Learner: Patient    Learning Barriers:  Learning barriers: Reading and Cognitive    Preferred Language: English     Needed: No    Education Provided:   Method: Discussion, Handout and Demonstration  Readiness: acceptance and eager  Response: Demonstrated understanding and Verbalizes understanding      CO-LEARNER ASSESSMENT:    Completed: Yes:   Co-Learner name:  Thelma    Learner: Co-learner    Learning Barriers:  Learning barriers: No Barriers    Preferred Language: English     Needed: No    Education Provided:   Method:  Discussion, Handout and Demonstration  Readiness:   acceptance and eager  Response:   Demonstrated understanding and Verbalizes understanding  Comments: This is a friend of the patient who has been assisting in her care          Welcome letter discussed: Yes Patient provided with Welcome Letter, which includes attendance policy. Provided education regarding cancellation and no-show policy. Education regarding the importance of participation and regular attendance to maximize goal attainment.       OBJECTIVE MEASUREMENTS/DATA:    Time In Session:  Start Time: 1405  Stop Time: 1500  Time Calculation (min): 55 min   Assessment - 08/17/23 0927        Assessment    Plan of Care reviewed and patient/family in agreement Yes     Comments Patient and caregiver are in agreement     System Pathology/Pathophysiology Noted neuromuscular     Functional Limitations in Following Categories self-care;home management;community/leisure     Problem List: Occupational Therapy motor control impaired;sensation decreased;decreased flexibility;impaired swallowing;sensory feedback impaired;impaired cognition;inability to direct their own care;pain;ROM decreased;strength decreased     Rehab Potential/Prognosis: Occupational Therapy good, to achieve stated therapy goals     Clinical " Assessment Patient seen in OP OT for initial evaluation with referral from Dr. Haywood s/p CVA and arrives with friend Thelma who has been assisting in her care. Patient now exhibiting aphasia with limited ability to express herself and Thelma provided information regarding baseline functioning and current ADL/IADL performance. Patient now with right side neglect and high frustration with lack of functional use at RUE. She exhibits AROM WFL at bilateral shoulders and elbows, and able to perform movements with high accuracy when cued by therapist. ROM screening indicates deficits in right wrist ROM and limited right hand ROM, with discomfort at SF with flexion. Right hand fingers also appear enlarged compared to left hand, and Thelma notes previous bruising and possible injury at time of CVA. Patient demonstrates severe deficits in right hand dexterity per Box and Block test (4 blocks as compared to 30 at left hand) and unable to register reading on  strength assessment at right hand. Screening of sensation indicates partial deficit with deep pressure sensation at upper right arm, and complete loss of deep pressure sensation at right hand. Patient can benefit from twice weekly OT for 3 months to facilitate functional recovery with use of RUE including with dexterity, strength, and awareness, to promote increased engagement and performance with ADLs and IADLs.     Plan and Recommendations Incorporate rehabilitative and compensatory strategies for RUE sensory and motor recovery     Planned Services CPT 67050 Neuromuscular Reeducation;CPT 11115 Self-care/Home management training;CPT 46459 Therapeutic activities;CPT 31431 Electrical stimulation ATTENDED                General Information - 08/16/23 0781        Session Details    Document Type initial evaluation        General Information    Onset of Illness/Injury or Date of Surgery 06/26/23     Referring Physician Dr. Haywood     History of present  illness/functional impairment The patient is a 78-year-old  female with a history of AAA, heart block s/p cardiac pacemaker, glaucoma, hyperlipidemia, coronary artery disease, NSTEMI, who presented to [facility] on June 26 after she has not been seen by her friends for a few days.  Patient lives alone and when she was found by EMS she was confused and combative.  In the emergency department she was aphasic with right-sided weakness and hypertensive to the 190 systolic.  CT scan of the head revealed an acute infarct in left MCA with mild bleeding.  Echo showed an EF of 43% and a bubble study was negative.  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  Later this was transitioned to apixaban.  She was continued on statin for secondary prevention.  She required virtual shivani while in the hospital but this was not effective.  She was evaluated by speech therapy and cleared for a soft and bite-size diet with moderately thick liquids.'     Patient/Family/Caregiver Comments/Observations Patient arrives with friend Thelma who has been assisting in her care                Pain/Vitals - 08/16/23 1403        Pain Assessment    Currently in pain No/Denies                OT - 08/16/23 1458        OT Frequency and Duration    Frequency of treatment 2 times/week     OT Duration 3 months     OT Cert From 08/16/23     OT Cert To 11/14/23     Date OT POC was sent to provider 08/16/23                Falls/Food Screening - 08/17/23 0985        Initial Falls Assessment    One or more falls in the last year No        Food Insecurity    Within the past 12 months, you worried that your food would run out before you got the money to buy more. Never true     Within the past 12 months, the food you bought just didn't last and you didn't have money to get more. 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 Driving. Retired 5yr ago after MI on day of [facility]. Loves her cat, Loan. Enjoys going out to eat, visit with friends and neighbors.               Living Environment    Living Environment - 08/17/23 0927        Living Environment    People in Home alone   with a cat, loan    Living Environment Comment 2SH, B/B 2nd flr-tub shower with shower chair and grabbars in shower, FF stairs to 2nd flr w/L ascending rail, no MD.  no DARÍO.               Reading and Writing     Reading and Writing - 08/16/23 1403        Reading and Writing Assessment    Reading (comments) unable to read        Written Language    Written Expression (Written Language) word level     Word Level, Written Expression (Written Language) moderate impairment     Comment, Assessment (Written Language) Patient able to copy words with use of left hand but unable to write with right hand               Skin and Sensation    Skin and Sensation - 08/16/23 1403        OTHER    Skin Assessment/Comments partial deep pressure at upper right arm, no intact deep pressure at hand/wrist               Range of Motion     PROM/AROM - 08/16/23 1500        RIGHT: Upper Extremity AROM Assessment    Right hand AROM:  Unable to form full fist with 6.5 cm from MF to DPC               BADL/IADL    BADL/IADL - 08/16/23 1403        BADL Interventions Assessment    Upper Body Dressing Modified independence     Lower Body Dressing Modified independence     Bathing Minimum assist (75% patient effort)     Bathing comments Some difficulties with washing hair and bathing     Toileting Independent     Grooming comments Difficulty with brushing teeth     Eating Minimum assist (75% patient effort)     Eating comments diet is soft food only; requires assist with cutting into pieces               Work and School     Work and School Assessment - 08/17/23 0927        Work/School/Leisure Assessment    Job  Performance (comments) Retired     Leisure / Social Participation (comments) Previously enjoyed socialized and animal lover               Gross and Fine Motor     Gross and Fine Motor - 08/17/23 0927        Hand  Strength Testing    Left Hand, Setting 2 30 lbs     Right Hand, Setting 2 0 lbs               Outcome Measures:    Outcome Measures - 08/16/23 1403        Objective Outcome Measures    RIGHT hand: Box and Blocks Assessment 4     LEFT hand: Box and Blocks Assessment 50                 GOALS:     Goals     • OT Neuro/Deconditioning Goals        Short Term Goals Time Frame Result Comment/Progress   Assess gross motor control via Box and Block Assessment  4 weeks Ongoing     Assess fine motor control via 9 hole peg test 4 weeks Ongoing     Improved automatic fine motor use at right hand with routine tasks (e.g. eating, writing, brushing teeth) to 25% of baseline  4 weeks Ongoing     Explore adaptive visual strategies to encourage looking to right side 4 weeks Ongoing    Incorporate right hand in at least 2 self-care activities 4 weeks Ongoing    Carry over HEP at least 3 times per week  4 weeks Ongoing        Long Term Goals Time Frame Result Comment/Progress   Improve gross motor control as evidenced by at least 10 block improved at RUE with Box and Block Assessment for improved ability to perform ADLs and IADLs  12 weeks Ongoing     Patient will complete 9 hole peg test in under 3 minutes 12 weeks Ongoing    Decrease distance from Right MF to DPC to 2 cm for improved functional grasp at right hand 12 weeks Ongoing     Improved automatic fine motor use at right hand with routine tasks (e.g. eating, writing, brushing teeth) to 75% of baseline 12 weeks Ongoing                    TREATMENT PLAN:    OT NEURO FLOW SHEET     EXERCISES CURRENT SESSION TIME   Initial evaluation 8/16/23 40 minutes   NEURO RE-ED  CPT 67556 TOTAL TIME FOR SESSION   15 Minutes   AAROM/AROM and PROM Assessed     Spasticity management        Strengthening Asssessed      Strength       Gross Motor Control       Fine Motor Control  Assessed    Patient participated in L and R side handwriting activity    Patient participated in gross grasp with soft ball and bilateral transferring between hands     Sitting Jennifer/Balance       Standing Jennifer/Balance       WT Bearing       Graded motor imagery       THERE ACT  CPT 17516 TOTAL TIME FOR SESSION 0-7 Minutes   Pain, Vitals, Meds, Etc. Reviewed, issued attendance policy handout     Transfers       THERE EX  CPT 19751 TOTAL TIME FOR SESSION 0-7 Minutes   PROM       Activity Tolerance       SELF-CARE  CPT 03091 TOTAL TIME FOR SESSION  Minutes   Symptom Management       Pt Education/Safety          Transfer Training       ADL Training      IADL Training      MODALITIES  CPT 95541 TOTAL TIME FOR SESSION Not performed   Ice/Heat       Attended E-Stim       SPLINTING  CPT 83079 TOTAL TIME FOR SESSION Not performed   Fabrication/Check Out       Fit       Training                  This 78 y.o. year old female presents to OT with above stated diagnosis. Occupational Therapy evaluation reveals motor control impaired, sensation decreased, decreased flexibility, impaired swallowing, sensory feedback impaired, impaired cognition, inability to direct their own care, pain, ROM decreased, strength decreased resulting in self-care, home management, community/leisure limitations. Jennifer Sams will benefit from skilled OT services to address limitation, work towards rehab and patient goals and maximize PLOF of chosen ADLs.     Planned Services: The patient’s treatment will include CPT 98044 Neuromuscular Reeducation, CPT 99471 Self-care/Home management training, CPT 86669 Therapeutic activities, CPT 64737 Electrical stimulation ATTENDED, .    Barry Rosas, OT

## 2023-08-17 NOTE — OP OT TREATMENT LOG
OT NEURO FLOW SHEET     EXERCISES CURRENT SESSION TIME   Initial evaluation 8/16/23 40 minutes   NEURO RE-ED  CPT 64886 TOTAL TIME FOR SESSION   15 Minutes   AAROM/AROM and PROM Assessed     Spasticity management       Strengthening Asssessed      Strength       Gross Motor Control       Fine Motor Control  Assessed    Patient participated in L and R side handwriting activity    Patient participated in gross grasp with soft ball and bilateral transferring between hands     Sitting Jennifer/Balance       Standing Jennifer/Balance       WT Bearing       Graded motor imagery       THERE ACT  CPT 99689 TOTAL TIME FOR SESSION 0-7 Minutes   Pain, Vitals, Meds, Etc. Reviewed, issued attendance policy handout     Transfers       THERE EX  CPT 90772 TOTAL TIME FOR SESSION 0-7 Minutes   PROM       Activity Tolerance       SELF-CARE  CPT 92464 TOTAL TIME FOR SESSION  Minutes   Symptom Management       Pt Education/Safety          Transfer Training       ADL Training      IADL Training      MODALITIES  CPT 37359 TOTAL TIME FOR SESSION Not performed   Ice/Heat       Attended E-Stim       SPLINTING  CPT 07691 TOTAL TIME FOR SESSION Not performed   Fabrication/Check Out       Fit       Training

## 2023-08-18 NOTE — OP SLP TREATMENT LOG
BDAE  Eval: 8/18/2023      Aphasia Severity Rating Scale 1/5    CONVERSATIONAL and EXPOSITORY SPEECH       Simple Social Responses Raw Score:   Percentiles:    Complexity Index  Raw Score:    Ratio:   Percentiles:     Narrative Discourse  Short form Raw Score:   Ratio:   Percentiles:     AUDITORY COMPREHEN     Basic Word Discrimination    Commands  Standard Form     Complex Ideational Material  Standard Form             Raw Score: 29/37  Percentiles:20%     Raw Score:   Percentiles:      Raw Score:   Percentiles:                             ARTICULATION  Agility              ORAL EXPRESSION  Recitation, Yolis, Rhythm              Automatized Sequences  Standard Form   Recitation: 0  Percentiles: 30%tile  Yolis: 1  Percentiles: 30%tile  Rhythm: 1  Percentiles: 60%tile     Raw Score: 0/8  Percentiles: 0%tile       REPETITION  Words  Standard Form     Repetition of Nonsense words     Sentences  Standard Form    Raw Score: 0  Percentiles:0 %tile     Raw Score:  Percentiles:      Raw Score:  Percentiles:                          NAMING  Responsive Naming  Standard Form     [employer]prise Naming Test  Standard Form    Raw Score:   Percentiles:      Raw Score:   Stim cue:   PC:   MC:   Percentiles:   Mean:                 READING  Match case and scripts    Number matching    Picture-Word Matching    Oral Word Reading    Oral Sentence Reading    Oral Sentence Comp    Sentence/Paragraph    Comprehension   Raw Score: 7  Percentiles: 40%tile  Raw Score:   Percentiles:   Raw Score: 10  Percentiles: 100%tile  Raw Score:   Percentiles:   Raw Score:   Percentiles:   Raw Score:   Percentiles:   Raw Score:   Percentiles:   Raw Score:   Percentiles:      WRITING  Form    Letter Choice    Motor Facility    Primer Words    Regular Phonics    Common Irregular Words    Written Picture Naming    Narrative Writing    Raw Score:   Percentiles:   Raw Score:   Percentiles:   Raw Score:   Percentiles:   Raw Score:   Percentiles:   Raw Score:    Percentiles:   Raw Score:   Percentiles:   Raw Score:   Percentiles:   Raw Score:   Percentiles:           IE: 2023   Rating Scale Profile of Speech Characteristics 1-7 scale   Articulatory Agility-phoneme and syllable level    Phrase Length: longest word runs /   Grammatical Form: Variety and use of morphemes 7   Melodic Line (Prosody)    Paraphasia in Running Speech     Word Finding Relative to Fluency    Sentence Repetition    Auditory Comprehension tbd/      Aphasia Severity Ratin/5-

## 2023-08-19 NOTE — PROGRESS NOTES
Speech-Language Pathology Evaluation    \[facility]\"" OP Therapy Fax: [phone]    SLP EVALUATION FOR OUTPATIENT THERAPY    Patient: Jennifer Sams     MRN: [id number]  : 1945 78 y.o.    Referring Physician: Liv Haywood, *  Date of Visit: 2023    Certification Dates:  23 through 23    Recommended Frequency & Duration:  2 times/week for up to 3 months   PN: 2023    Diagnosis:   1. Acute ischemic left MCA stroke (CMS/HCC)    2. Cerebrovascular accident (CVA), unspecified mechanism (CMS/HCC)        Chief Complaints:   Chief Complaint   Patient presents with   • Speech Problem     Apraxia, Aphasia, Dysphagia, reading       Precautions:      Past Medical History:   Past Medical History:   Diagnosis Date   • AAA (abdominal aortic aneurysm) (CMS/HCC)    • Arthritis    • Elevated blood pressure reading without diagnosis of hypertension 2019   • Epistaxis 2020   • GERD (gastroesophageal reflux disease) 2019   • Glaucoma 2019   • Heart murmur    • Hiatal hernia    • History of hip replacement, total, right 2019    Right hip 2016 and revision     • History of rheumatic fever as a child 2019   • Hypercholesterolemia 2019   • Ischemic cardiomyopathy 2019   • Kidney cysts    • MI (myocardial infarction) (CMS/HCC)    • Osteoarthritis of multiple joints 2019   • Osteoporosis    • Pacemaker 2019   • Raynaud's disease 2019   • RSD (reflex sympathetic dystrophy) 2019    Of left foot   • SOB (shortness of breath) 12/10/2019   • Status post insertion of drug eluting coronary artery stent 2019       Past Surgical History:   Past Surgical History:   Procedure Laterality Date   • CHOLECYSTECTOMY OPEN     • CORONARY ANGIOPLASTY WITH STENT PLACEMENT  2019    of LAD   • CORONARY ANGIOPLASTY WITH STENT PLACEMENT  2019    of RCA   • DILATION AND CURETTAGE OF UTERUS     • EYE SURGERY      RIGHT CATARACT   • FOOT  SURGERY Left    • JOINT REPLACEMENT Right 09/2016    total hip   • REVISION TOTAL HIP ARTHROPLASTY Right 2017   • TONSILLECTOMY         LEARNING ASSESSMENT    Assessment completed: Yes    Learner name:  Thelma    Learner: Other friend    Learning Barriers:  Learning barriers: No Barriers    Preferred Language: English     Needed: No    Education Provided:   Method: Discussion and Demonstration  Readiness: acceptance  Response: Verbalizes understanding      CO-LEARNER ASSESSMENT:    Completed: Yes:   Co-Learner name:  Jennifer Sams    Learner: Patient    Learning Barriers:  Learning barriers: Language    Preferred Language: English     Needed: No    Education Provided:   Method:  Discussion and Demonstration  Readiness:   acceptance  Response:   Needs reinforcement  Comments: Aphasia,Apraxia    Welcome letter discussed: Yes Patient provided with Welcome Letter, which includes attendance policy. Provided education regarding cancellation and no-show policy. Education regarding the importance of participation and regular attendance to maximize goal attainment.       OBJECTIVE MEASUREMENTS/DATA:    Session Time:  Start Time: 1500  Stop Time: 1600  Time Calculation (min): 60 min   Assessment - 08/16/23 2100        Assessment    Plan of Care reviewed and patient/family in agreement Yes     Comments Discussed plan of care for 3 months with monthly progress reports to target motor speech with consideration of reading and auditory stimuli     Rehab Potential/Prognosis (SLP) adequate, monitor progress closely     Problem List Impaired communication;Motor control impaired     Clinical Assessment Ms. Sams a 79 yo female presents to [facility] for an evaluation of present levels of communication as a result of CVA on June 26, 2023.  Ms Sams exhibits severe communcation impairments characterized by severe apraxia of speech, audiitory comprehension, reading and writing deficits.  Auditory and  reading comprehension will be further assessed at first follow up.  Ms Wells can be observed with limited intelligible speech noted with intact prosodic variation, islands of intelligible words and phrases, severely impaired imitative and self directed speech patterns.  Repetiton was limited to tense vowls and minimal bilabials.  Ms. Sams will benefit from speech therapy for motor speech production at the phoneme, syllable and simple word and phrase level with consistent stimuli for auditory comprehension and reading.     Plan and Recommendations Complete select auditory comprehension subtests and reading subtests.  Initiate bialbial and vowel production. Establish and practice bombardment of functional word, phrase list.     [employer] CPT 87082 Speech Lang Voice Comm and/or Auditory Proc (Treatment of speech, language, voice, communication and/or hearing processing disorder)                General Information - 08/16/23 1505        Session Details    Document Type initial evaluation     Mode of Treatment individual therapy        General Information    Onset of Illness/Injury or Date of Surgery 06/26/23     Referring Physician Dr. Haywood     History of present illness/functional impairment Jennifer is a 78 y.o. female who presented to [facility] on June 26 after she has not been seen by her friends for a few days.  Patient lives alone and when she was found by EMS she was confused and combative.  In the emergency department she was aphasic with right-sided weakness and hypertensive to the 190 systolic.  CT scan of the head revealed an acute infarct in left MCA with mild bleeding.  Echo showed an EF of 43% and a bubble study was negative.  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 Pt transferred to  [facility] on 7/1/2023 Principal problem  Acute ischemic left MCA stroke (CMS/HCC). PMH AAA, heart block s/p cardiac pacemaker, glaucoma, hyperlipidemia, coronary artery  disease, She was evaluated by speech therapy and cleared for a soft and bite-size diet with moderately thick liquids.  7/2/2023:  Diet SB6 with mildly thick liquids (MT2)  7/6/2023: Upgraded to EC7 with thin liquids.  Pt discharged home with 24 hr care and home health services recieving speech therapy for motor speech and aphasia.     Limitations/Impairments other (see comments)     Interventions communication.  Explore written, nonverbal and low tech AAC to aid in communication.     Patient/Family/Caregiver Comments/Observations Pt friend/ex-coworker present for therapy session to assist in history intake.                Pain and Vitals - 08/16/23 1505        Pain Assessment    Currently in pain No/Denies                SLP - 08/16/23 1505        Speech Therapy    Speech Therapy Specialty Traditional Neuro Program ST        SLP Frequency and Duration    Frequency of treatment 2 times/week     Speech Duration 3 months     SLP Custom Frequency and Duration PN: 9/16/2023     SLP Cert From 08/16/23     SLP Cert To 11/14/23     Date SLP POC was sent to provider 08/16/23     Signed SLP Plan of Care received?  No                Falls Assessment/Food Screening - 08/16/23 1505        Initial Falls Assessment    One or more falls in the last year No        Food Insecurity    Within the past 12 months, you worried that your food would run out before you got the money to buy more. Never true     Within the past 12 months, the food you bought just didn't last and you didn't have money to get more. Never true                 Living Environment    Living Environment - 08/16/23 1505        Living Environment    People in Home alone   [employer] Loan              PLOF    Prior Level of Function - 08/16/23 1505        OTHER    Previous level of function Independent ADL/IADLs, driving, lived alone entire life, finances, retired  to [employer]               Language Assessment     Language Assessment - 08/18/23 1142         "Verbal Expression    Automatic Speech (Verbal Expression) unable/difficult to assess     Confrontational Naming (Verbal Expression) unable/difficult to assess     Repetition Skills (Verbal Expression) unable/difficult to assess;other (see comments)   see motor speech section for phoneme imitation    Narrative Speech (Verbal Expression) unable/difficult to assess     Conversational Speech (Verbal Expression) phrase level;word level;other (see comments)   small \"islands\" of words and phrases appear, but unreliable for effective commuication.    Comment, Assessment (Verbal Expression) small \"islands\" of words and phrases appear, but unreliable for effective commuication.     Verbal Expression Intervention automatic speech;confrontational naming;word level     Comment, Intervention (Verbal Expression) Pair motor speech tasks with word retrieval and reading.        Auditory Comprehension    Follows Commands (Auditory Comprehension) not tested;other (see comments)   TBA    Yes/No Questions (Auditory Comprehension) not tested   TBA    Paragraph Comprehension (Auditory Comprehension) not tested   TBA    Picture Identification (Auditory Comprehension) field of 2 pictures;field of 4 pictures     Comment, Assessment (Auditory Comprehension) BDAE: Basic word discrimination F2 and F4: 29/37, 20%tile.  Noted difficulty in areas of body parts, letter and digit identification.     Comment, Intervention (Auditory Comprehension) SLP to complete Assessment of Auditory comprehension at first follow up: Following commands and Complex Ideational material  with Y/N to sentences and paragraphs.               Reading and Writing    Reading and Writing - 08/18/23 1142        Written Language    Comment, Assessment (Written Language) SLP to defer to OT evaluation and treatment. Per OT word level moderate impairment. Pt able to copy words with non-dominant (left) hand.  Pt spontaneously wrote name \"Sharlene\".  Attempt to write on X2 additional " "occasions noted with letter form and recognition, however word was unidentifiable. (VIKY Luque)        Reading Comprehension    Oral Reading Ability (Reading Comprehension) word level     Word Level, Oral Reading Ability (Reading Comprehension) minimal impairment;other (see comments)   Supervision to manage impulsivity.    Comprehension Level (Reading Comprehension) word level tasks     Word, Comprehension Level (Reading Comprehension) minimal impairment     Functional Reading Tasks (Reading Comprehension) not tested        Reading and Writing Assessment    Reading and Writing Comments: SLP to address reading to support motor speech home practice and cues.               Motor Speech    Motor Speech - 08/18/23 1142        Motor Speech Characteristics    Speech Intelligibility words with less than 25% intelligibility        Motor Speech    Speech Intelligibility (Motor Speech) word level;phrase/sentence level     Word Level, Speech Intelligibility (Motor Speech) severe impairment;unable/difficult to assess     Phrase/Sentence Level, Speech Intelligibility (Motor Speech) severe impairment;unable/difficult to assess;other (see comments)   small \"islands\" of words and phrases appear, but unreliable for effective commuication.    Speech Fluency (Motor Speech) sound/syllable repetitions;unable/difficult to assess   Provided direct model with visual PROMPT cues pt was able to produce in isolation,/m,w/ and 5/5 tense vowels.  Pt was unable to produce /p, b, f, v/and 0/5 lax vowels.    Vocal Loudness (Motor Speech) WNL     Breath Support (Motor Speech) intact     Rate/Prosody (Motor Speech) rapid rate     Resonance (Motor Speech) WNL     Comment, Motor Speech Assessment Severe motor speech (Acquired Apraxia of Speech) noted with poor imitation at the single phoneme level with direct and mult modal modeling.  Pt noted with intelligible islands of speech appropriate to topic.  Examples of spontaneous speech this date. \"Oh Thank " "you, No,  What? nothing, when, where, Thelma, picky, shut up,yesterday\"     Comment, Motor Speech Intervention Therapy will target phonemes in isolation anterior to posterior, tense vowels, VC,CV, CVCV syllable combinations with supported written stimulus with consideration of reading. Auditory comprehension for following commands for motor speech to be further assessed.               OM: FCM/Voice/Neuro    Outcome Measures - 08/16/23 1742        Functional Communication Measures    FCM: Motor Speech 2-->Level 2   Goal L4    FCM: Reading 3-->Level 3   Goal: maintain 3, indirect therapy, improve to 4.    FCM: Spoken Language, Comprehension --   TBD, suspect L2, goal, indirect therapy-4    FCM: Spoken Language, Expression 2-->Level 2                 TREATMENT PLAN:        BDAE  Eval: 8/18/2023      Aphasia Severity Rating Scale 1/5    CONVERSATIONAL and EXPOSITORY SPEECH       Simple Social Responses Raw Score:   Percentiles:    Complexity Index  Raw Score:    Ratio:   Percentiles:     Narrative Discourse  Short form Raw Score:   Ratio:   Percentiles:     AUDITORY COMPREHEN     Basic Word Discrimination    Commands  Standard Form     Complex Ideational Material  Standard Form             Raw Score: 29/37  Percentiles:20%     Raw Score:   Percentiles:      Raw Score:   Percentiles:                             ARTICULATION  Agility              ORAL EXPRESSION  Recitation, Yolis, Rhythm              Automatized Sequences  Standard Form   Recitation: 0  Percentiles: 30%tile  Yolis: 1  Percentiles: 30%tile  Rhythm: 1  Percentiles: 60%tile     Raw Score: 0/8  Percentiles: 0%tile       REPETITION  Words  Standard Form     Repetition of Nonsense words     Sentences  Standard Form    Raw Score: 0  Percentiles:0 %tile     Raw Score:  Percentiles:      Raw Score:  Percentiles:                          NAMING  Responsive Naming  Standard Form     [place] Naming Test  Standard Form    Raw Score:   Percentiles:      Raw Score: "   Stim cue:   PC:   MC:   Percentiles:   Mean:                 READING  Match case and scripts    Number matching    Picture-Word Matching    Oral Word Reading    Oral Sentence Reading    Oral Sentence Comp    Sentence/Paragraph    Comprehension   Raw Score: 7  Percentiles: 40%tile  Raw Score:   Percentiles:   Raw Score: 10  Percentiles: 100%tile  Raw Score:   Percentiles:   Raw Score:   Percentiles:   Raw Score:   Percentiles:   Raw Score:   Percentiles:   Raw Score:   Percentiles:      WRITING  Form    Letter Choice    Motor Facility    Primer Words    Regular Phonics    Common Irregular Words    Written Picture Naming    Narrative Writing    Raw Score:   Percentiles:   Raw Score:   Percentiles:   Raw Score:   Percentiles:   Raw Score:   Percentiles:   Raw Score:   Percentiles:   Raw Score:   Percentiles:   Raw Score:   Percentiles:   Raw Score:   Percentiles:           IE: 2023   Rating Scale Profile of Speech Characteristics 1- scale   Articulatory Agility-phoneme and syllable level    Phrase Length: longest word runs    Grammatical Form: Variety and use of morphemes    Melodic Line (Prosody)    Paraphasia in Running Speech     Word Finding Relative to Fluency    Sentence Repetition    Auditory Comprehension tbd      Aphasia Severity Ratin/5-         GOALS:     Goals     • SLP Motor Goals         Time Frame  Result  Comment/Progress    Will improve FCM in the following:  MOTOR SPEECH  Current:  Goal:   LTG- 12 w     Demonstrate effective speech intelligibility to communicate at single word and functional phrase level with (min A) use of compensatory strategies with familiar listeners  LTG- 12 w     Pt will produce bilabials /p, b, m, w/, tip alveolars /t, d, n/ and tense vowels /a, e, I, o,u/ in isolation provided mod A, 80% of trials.  STG-4w        Pt will produce VC and CV syllables provided the above phonemes, Mod A, 80% of trials STG- 6-8 w        Pt will produce single and  bisyllablic words consisting of CVCV, VCV and CVC, Mod A, 80% of presentations.  STG 8-10        Pt will produce simple automatics provided mod A:  1-10  A-G  Yolis and identifiable syllable combinations for MALLY  STG- 8-10       Pt will produce small set of functional words/phrases in unison or in imitation with SLP, then produce 3  successive attempts with 80% accuracy  STG 8-10        Auditory Comprehension and reading to be further assessed for determination of goals to support speech production                    FCM: MOTOR SPEECH:  L3:  The communication partner must assume primary responsibility for interpreting the communication exchange, however the individual is able to produce short consonant-vowel combinations or automatic words intelligibly. With consistent MODERATE cueing, the individual can produce simple words and phrases intelligibly, although accuracy may vary.                 EVALUATION AND ASSESSMENT:     Assessment - 08/16/23 2100        Assessment    Plan of Care reviewed and patient/family in agreement Yes     Comments Discussed plan of care for 3 months with monthly progress reports to target motor speech with consideration of reading and auditory stimuli     Rehab Potential/Prognosis (SLP) adequate, monitor progress closely     Problem List Impaired communication;Motor control impaired     Clinical Assessment Ms. Sams a 77 yo female [facility] for an evaluation of present levels of communication as a result of CVA on June 26, 2023.  Ms Sams exhibits severe communcation impairments characterized by severe apraxia of speech, audiitory comprehension, reading and writing deficits.  Auditory and reading comprehension will be further assessed at first follow up.  Ms Wells can be observed with limited intelligible speech noted with intact prosodic variation, islands of intelligible words and phrases, severely impaired imitative and self directed speech patterns.  Paulina  was limited to tense vowls and minimal bilabials.  Ms. Sams will benefit from speech therapy for motor speech production at the phoneme, syllable and simple word and phrase level with consistent stimuli for auditory comprehension and reading.     Plan and Recommendations Complete select auditory comprehension subtests and reading subtests.  Initiate bialbial and vowel production. Establish and practice bombardment of functional word, phrase list.     [employer] CPT 17319 Speech Lang Voice Comm and/or Auditory Proc (Treatment of speech, language, voice, communication and/or hearing processing disorder)             Magnolia Lundy, CCC-SLP

## 2023-08-25 ENCOUNTER — HOSPITAL ENCOUNTER (OUTPATIENT)
Dept: SPEECH THERAPY | Age: 78
Setting detail: THERAPIES SERIES
Discharge: HOME | End: 2023-08-25
Attending: PHYSICAL MEDICINE & REHABILITATION
Payer: MEDICARE

## 2023-08-25 ENCOUNTER — HOSPITAL ENCOUNTER (OUTPATIENT)
Dept: PHYSICAL THERAPY | Age: 78
Setting detail: THERAPIES SERIES
Discharge: HOME | End: 2023-08-25
Attending: PHYSICAL MEDICINE & REHABILITATION
Payer: MEDICARE

## 2023-08-25 DIAGNOSIS — I63.512 ACUTE ISCHEMIC LEFT MCA STROKE (CMS/HCC): Primary | ICD-10-CM

## 2023-08-25 DIAGNOSIS — R26.9 NEUROLOGIC GAIT DYSFUNCTION: ICD-10-CM

## 2023-08-25 DIAGNOSIS — I63.9 CEREBROVASCULAR ACCIDENT (CVA), UNSPECIFIED MECHANISM (CMS/HCC): ICD-10-CM

## 2023-08-25 PROCEDURE — 97110 THERAPEUTIC EXERCISES: CPT | Mod: GP,KX,59

## 2023-08-25 PROCEDURE — 97530 THERAPEUTIC ACTIVITIES: CPT | Mod: GP,KX,59

## 2023-08-25 PROCEDURE — 92507 TX SP LANG VOICE COMM INDIV: CPT | Mod: GN,KX

## 2023-08-25 ASSESSMENT — BALANCE ASSESSMENTS: TOTAL SCORE: 50

## 2023-08-25 NOTE — OP SLP TREATMENT LOG
"MOTOR/PRODUCTION SLP FLOW SHEET    SKILL AREA CURRENT SESSION   SPEECH PRODUCTION (MOTOR)  CPT 20955    Pt will produce bilabials /p, b, m, w/, tip alveolars /t, d, n/ and tense vowels /a, e, I, o,u/ in isolation provided mod A, 80% of trials.  2023  p- unable  b max A,   m- mod A, frequently substitutes /n/   w- Min A,  /a, e, I, o,u/- min to mod A   Pt will produce VC and CV syllables provided the above phonemes, Mod A, 80% of trials 2023  /w/ + vowels: Mod to max A   Pt will produce single and bisyllablic words consisting of CVCV, VCV and CVC, Mod A, 80% of presentations.    Pt will produce simple automatics provided mod A:  1-10  A-G  Yolis and identifiable syllable combinations for MALLY    Pt will produce small set of functional words/phrases in unison or in imitation with SLP, then produce 3  successive attempts with 80% accuracy  2023  Established list of functional words, names. See below.  Pt stimulable for .  Approximations for remainder.   Noted with difficulty with velars, blends in particular.     Auditory Comprehension and reading to be further assessed for determination of goals to support speech production    GOAL: 2023  Given direct model, repetition and NV reinforcement, Pt will follow 1-step commands for motor speech production up to 1-2 syllable words, 80% of presentations.  2023  Auditory comp,severe impairment:  Aphasia Severity Ratin/5-   All Communication is through fragmentary expression; great need for inference, questioning, and guessing by the listener.  The range of information that can be exchanged is limited, and the listener carries the burden of communication.    Provided pictures/objects, and/or body parts, pt will demonstrate  understanding of \"point to___\" and \" ___\" 85% of trials.     Education/Strategy Training    Other 2023  POA reports suspect decline in cognition recently.  Encouraged to report to MD and encourage improving sleep " and nutrition routines.      Functional words and names: 8.25.2023  Dick Pace  Arnoljay Peterson      BDAE  Eval: 8/18/2023      Aphasia Severity Rating Scale 1/5    CONVERSATIONAL and EXPOSITORY SPEECH       Simple Social Responses Raw Score:   Percentiles:    Complexity Index  Raw Score:    Ratio:   Percentiles:     Narrative Discourse  Short form Raw Score:   Ratio:   Percentiles:     AUDITORY COMPREHEN     Basic Word Discrimination    Commands  Standard Form  8/25/2023     Complex Ideational Material  Short Form  8/25/2023             Raw Score: 29/37  Percentiles:20%     Raw Score: 3/15  Percentiles: 0-10%       Raw Score: 0/4  Percentiles: 0%                            ARTICULATION  Agility              ORAL EXPRESSION  Recitation, Yolis, Rhythm              Automatized Sequences  Standard Form   Recitation: 0  Percentiles: 30%tile  Yolis: 1  Percentiles: 30%tile  Rhythm: 1  Percentiles: 60%tile     Raw Score: 0/8  Percentiles: 0%tile       REPETITION  Words  Standard Form     Repetition of Nonsense words     Sentences  Standard Form    Raw Score: 0  Percentiles:0 %tile     Raw Score:  Percentiles:      Raw Score:  Percentiles:                          NAMING  Responsive Naming  Standard Form     [place] Naming Test  Standard Form    Raw Score:   Percentiles:      Raw Score:   Stim cue:   PC:   MC:   Percentiles:   Mean:                 READING  Match case and scripts    Number matching    Picture-Word Matching    Oral Word Reading    Oral Sentence Reading    Oral Sentence Comp    Sentence/Paragraph    Comprehension   Raw Score: 7  Percentiles: 40%tile  Raw Score:   Percentiles:   Raw Score: 10  Percentiles: 100%tile  Raw Score:   Percentiles:   Raw Score:   Percentiles:   Raw Score:   Percentiles:   Raw Score:   Percentiles:   Raw Score:   Percentiles:      WRITING  Form    Letter Choice    Motor Facility    Primer Words    Regular  Phonics    Common Irregular Words    Written Picture Naming    Narrative Writing    Raw Score:   Percentiles:   Raw Score:   Percentiles:   Raw Score:   Percentiles:   Raw Score:   Percentiles:   Raw Score:   Percentiles:   Raw Score:   Percentiles:   Raw Score:   Percentiles:   Raw Score:   Percentiles:           IE: 2023   Rating Scale Profile of Speech Characteristics 1-7 scale   Articulatory Agility-phoneme and syllable level    Phrase Length: longest word runs    Grammatical Form: Variety and use of morphemes    Melodic Line (Prosody)    Paraphasia in Running Speech     Word Finding Relative to Fluency    Sentence Repetition    Auditory Comprehension       Aphasia Severity Ratin/5-   All Communication is through fragmentary expression; great need for inference, questioning, and guessing by the listener.  The range of information that can be exchanged is limited, and the listener carries the burden of communication.

## 2023-08-25 NOTE — OP PT TREATMENT LOG
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    PT Initial Evaluation  8/16/2023    Progress Note      POC Rx  2x/wk x90d    Ins Limitations      PT Neuro Exercises  Current Session Time   THER ACT   52451 y/n TOTAL TIME FOR SESSION 38-52 Minutes   Subjective OUTCOME MEASURES     ABC     PSFS y 8/25: unable to quantify 2o expressive aphasia   Objective OMs     impairmt based testing  8/16 IE: see assessment- GMC, MAS, DTR, MMT - via demo cues for motor task perfomance   5xSTS y 8/25: 5x = 15.65s / 30s = 8 ((AGE NORM = 13)   TUG     10MWT y 8/25: 8.89s = 1.12m/s   6MWT y 8/25:   2MWT = 402ft  6MWT = 1193ft  AGE NORM = 1545ft  Reporting LBP at [id number]   Neuro Oms:     miniBEST     mCTSIB / SOT     BBS y 8/25: 50/56 =low fall risk - impaired tap ups / SLS/SR EC   FGA  8/16 IE: 18/30 =fall risk   VS, falls review, medication changes y    Patient and caregiver (POA -remigio)  Education  y IE: role of OPPT to address post-infarct deficits including inattn, impairment-based deficits, functional deficits, explanation of spasticity and purpose of extremity synergies, goal to isolate movt.    Attendance policy provided including recommendations for prescribed dosage of care to achieve pt goals to resume baseline function.  Pt verbalized understanding.  8/25 : ongoing reinforcement for need to participate in balance and strengthening exercises to resolve RLE weakness and soreness - pt adamantly declining.  Pt declining use of RUE on R rail- self-selected learned nonuse.  Discussed if lack of active performance in PT, may DC.  POA in agreement.   HEP LOG y NV   Functional Transfers  Bed Mobility   Roll Left Pt declined   Roll Right Pt declined 2o fear of injury to R hip   Supine to Sit Mod I via log roll  "  Sit to Supine Mod I via log roll   Comment (Bed Mobility)    Transfers   Sit to Stand Transfer Mod I w/o AD   Assistive Device    Comment    Stand to Sit Transfer Mod I w/o AD   Assistive Device    Comment Floor transfer: declined 2o R hip apprehension   Stand Pivot/Stand Step Transfer Mod I w/o AD   Assistive Device    Comment    Gait Training   Level of A, cues S 2o R inattn, decreased visual scanning of environmt, impulsivity   Assistive Device W/o AD   Distance in Feet 100   Pattern (Gait) No gross asymmetries, persistent downward gaze   Comment (Gait/Stairs)    Stairs Training   Level of A, cues CS    Assistive Device L rail to ascend/L rail to descend-pt unable to motor plan to utilize rail similar to home 2o apraxia   Handrail Location (Stairs)    Number of Stairs 4 - refuses to use R hand on R rail via handoverhand   Ascending Stairs Technique reciprocally   Descending Stairs Technique reciporocally   Comment    Mobility Belt   Mobility Belt Used for All Out of Bed Activity y      Floor Transfers     THER EX  21219  TOTAL TIME FOR SESSION 8-22 Minutes   CARDIOVASCULAR     nustep     Stationary bike     treadmill          STRETCHING          STRENGTHENING     supine     sitting     standing y STS x10  FSU 6\"->12\" with max multimodal cues (best with demo cues) for RLE focus-pt declining using RUE on rail             NEURO RE-ED  71919  TOTAL TIME FOR SESSION Not performed   Neuro Oms (provides tx in addition to IE)     Developmental posturing          BALANCE TRAINING      SEATED     STATIC STANDING balance               DYNAMIC STANDING balance               GAIT TRAINING  44156  TOTAL TIME FOR SESSION Not performed   Gait training     Curb negotiation     Stairs     MANUAL  17591  TOTAL TIME FOR SESSION Not performed   MODALITIES  CP/MHP 59559 / Estim [id number])  TOTAL TIME FOR SESSION Not performed       "

## 2023-08-25 NOTE — PROGRESS NOTES
Physical Therapy Visit    PT DAILY NOTE FOR OUTPATIENT THERAPY    Patient: Jennifer Sams MRN: [id number]  : 1945 78 y.o.  Referring Physician: Liv Haywood, Debra  Date of Visit: 2023    Certification Dates: 23 through 23    Diagnosis:   1. Acute ischemic left MCA stroke (CMS/HCC)    2. Neurologic gait dysfunction        Chief Complaints:       Precautions:   Existing Precautions/Restrictions: limb restrictions, fall  Precautions comments: R body and spatial inattn,impulsivity/cognitive,  BLEEDING RISK-on elaquis; POA states no food restrictions      TODAY'S VISIT    Time In Session:  Start Time: 1300  Stop Time: 1400  Time Calculation (min): 60 min   History/Vitals/Pain/Encounter Info - 23 1307        Injury History/Precautions/Daily Required Info    Document Type daily treatment     Primary Therapist Aan Calderon, PT, NCS     Onset of Illness/Injury or Date of Surgery 07/10/23     Referring Physician Dr Haywood     Existing Precautions/Restrictions limb restrictions;fall     Precautions comments R body and spatial inattn,impulsivity/cognitive,  BLEEDING RISK-on elaquis; POA states no food restrictions     History of present illness/functional impairment 78y.o RHD female was referred by PMR after DC home from rehab .  Pt was admitted to [facility] 23 after she has not been seen by her friends for a few days.  POA states pt was clearly speaking with another friend on Saturday night.  No contact until Monday afternoon when a friend found pt upstairs walking around, agitated, confused, combative per EMSwith RUE/RLE hemiplegia, expressive aphasia.  EMS transported to  with aphasic with right-sided weakness and hypertensive to the 190 systolic.   CT Head (+) acute infarct in left MCA infarct w/3mm midline shift with mild bleeding.  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   Pt was transferred to [facility] 7/1->7/14 then transitioned home with home care and now transitioning to OP services.  Upon DC from [facility], was DC home with 24/7 S via friend from [place] but person flew back home Wed 8/9, now with intermittent S via POA, looking into home health assistance for intermittent S.  Pt doesn't have a medalert system but POA looking into a phone with faces to help pt call for emergencies.  Pt's POA states home health care mgmt is helping POA with [employer] but pt is non amenable to having someone in home.     Patient/Family/Caregiver Comments/Observations pt arrived to session from [facility].  POA states pt performed well but pt voicing frustration via 'no'.     Patient reported fall since last visit No        Pain Assessment    Currently in pain No/Denies        Pre Activity Vital Signs    /72     BP Location Left upper arm     BP Method Manual     Patient Position Sitting                Daily Treatment Assessment and Plan - 08/25/23 1307        Daily Treatment Assessment and Plan    Progress toward goals Slower than expected     Daily Outcome Summary Pt remains non-receptive to attempts at variable functional challenges including floor transfers, RLE exercise to facilitate strengthening, STS with focus on improving eccentric control to sit.  Required review of FGA responses to reinfrorce why pt is a fall risk with dynamic mobility.     Plan and Recommendations nustep CV conditioning x10min, static and dynamic balance challenges - possible DC if pt remains self-limiting.  POA in agreement.                     OBJECTIVE DATA TAKEN TODAY:    None taken  Outcome Measures    PT Outcome Measures - 08/25/23 1317        Objective Outcome Measures    6 Minute Walk Test 1193ft (reporting onset of LBP at 3min 48s)     2 Minute Walk Test 402ft     10 Meter Walk Test 1.12 meters/sec     30 Second Sit to Stand Test 8 repetitions     Five Times to Sit to Stand (FTSTS) 15.65        Gutierrez Balance Scale     Sitting to Standing Able to stand without using hands and stabilize independently     Standing Unsupported Able to stand safely for 2 minutes     Sitting with Back Unsupported but Feet Supported on Floor or on a Stool Able to sit safely and securely for 2 minutes     Standing to Sitting Sits safely with minimal use of hands   R inattn noted    Transfers Able to transfer safely definite need of hands   R inattn, tendancy to use L arm    Standing Unsupported with Eyes Closed Able to stand 10 seconds safely     Standing Unsupported with Feet Together Able to place feet together independently and stand 1 minute safely     Reach Forward with Outstretched Arm While Standing Can reach forward confidently 25 cm (10 inches)      Object from Floor from a Standing Position Able to  slipper safely and easily     Turning to Look Behind Over Left and Right Shoulders While Standing Looks behind from both sides and weight shifts well     Turn 360 Degrees Able to turn 360 degrees safely in 4 seconds or less   mild delay to initate to the right    Place Alternate Foot on Step or Stool While Standing Unsupported Able to complete 4 steps without aid with supervision   2o impulsivity    Standing Unsupported One Foot in Front Able to place foot tandem independently and hold 30 seconds     Standing on One Leg Tries to lift leg unable to hold 3 seconds but remains standing independently     Gutierrez Balance Score 50                 Today's Treatment:    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    PT Initial Evaluation  8/16/2023    Progress Note      POC Rx  2x/wk x90d    Ins Limitations      PT Neuro Exercises  Current Session Time   THER ACT   56417 y/n TOTAL TIME FOR SESSION 38-52 Minutes    Subjective OUTCOME MEASURES     ABC     PSFS y 8/25: unable to quantify 2o expressive aphasia   Objective OMs     impairmt based testing  8/16 IE: see assessment- GMC, MAS, DTR, MMT - via demo cues for motor task perfomance   5xSTS y 8/25: 5x = 15.65s / 30s = 8 ((AGE NORM = 13)   TUG     10MWT y 8/25: 8.89s = 1.12m/s   6MWT y 8/25:   2MWT = 402ft  6MWT = 1193ft  AGE NORM = 1545ft  Reporting LBP at [id number]   Neuro Oms:     miniBEST     mCTSIB / SOT     BBS y 8/25: 50/56 =low fall risk - impaired tap ups / SLS/SR EC   FGA  8/16 IE: 18/30 =fall risk   VS, falls review, medication changes y    Patient and caregiver (POA -remigio)  Education  y IE: role of OPPT to address post-infarct deficits including inattn, impairment-based deficits, functional deficits, explanation of spasticity and purpose of extremity synergies, goal to isolate movt.    Attendance policy provided including recommendations for prescribed dosage of care to achieve pt goals to resume baseline function.  Pt verbalized understanding.  8/25 : ongoing reinforcement for need to participate in balance and strengthening exercises to resolve RLE weakness and soreness - pt adamantly declining.  Pt declining use of RUE on R rail- self-selected learned nonuse.  Discussed if lack of active performance in PT, may DC.  POA in agreement.   HEP LOG y NV   Functional Transfers  Bed Mobility   Roll Left Pt declined   Roll Right Pt declined 2o fear of injury to R hip   Supine to Sit Mod I via log roll   Sit to Supine Mod I via log roll   Comment (Bed Mobility)    Transfers   Sit to Stand Transfer Mod I w/o AD   Assistive Device    Comment    Stand to Sit Transfer Mod I w/o AD   Assistive Device    Comment Floor transfer: declined 2o R hip apprehension   Stand Pivot/Stand Step Transfer Mod I w/o AD   Assistive Device    Comment    Gait Training   Level of A, cues S 2o R inattn, decreased visual scanning of environmt, impulsivity   Assistive Device W/o AD   Distance in  "Feet 100   Pattern (Gait) No gross asymmetries, persistent downward gaze   Comment (Gait/Stairs)    Stairs Training   Level of A, cues CS    Assistive Device L rail to ascend/L rail to descend-pt unable to motor plan to utilize rail similar to home 2o apraxia   Handrail Location (Stairs)    Number of Stairs 4 - refuses to use R hand on R rail via handoverhand   Ascending Stairs Technique reciprocally   Descending Stairs Technique reciporocally   Comment    Mobility Belt   Mobility Belt Used for All Out of Bed Activity y      Floor Transfers     THER EX  29877  TOTAL TIME FOR SESSION 8-22 Minutes   CARDIOVASCULAR     nustep     Stationary bike     treadmill          STRETCHING          STRENGTHENING     supine     sitting     standing y STS x10  FSU 6\"->12\" with max multimodal cues (best with demo cues) for RLE focus-pt declining using RUE on rail             NEURO RE-ED  17728  TOTAL TIME FOR SESSION Not performed   Neuro Oms (provides tx in addition to IE)     Developmental posturing          BALANCE TRAINING      SEATED     STATIC STANDING balance               DYNAMIC STANDING balance               GAIT TRAINING  07511  TOTAL TIME FOR SESSION Not performed   Gait training     Curb negotiation     Stairs     MANUAL  01350  TOTAL TIME FOR SESSION Not performed   MODALITIES  CP/MHP 00312 / Estim [id number])  TOTAL TIME FOR SESSION Not performed                              "

## 2023-08-27 NOTE — PROGRESS NOTES
Speech-Language Pathology Visit      SLP DAILY NOTE FOR OUTPATIENT THERAPY    Patient: Jennifer Sams   MRN: [id number]  : 1945 78 y.o.  Referring Physician: Liv Haywood, Debra  Date of Visit: 2023      Certification Dates: 23 through 23    Diagnosis:   1. Acute ischemic left MCA stroke (CMS/HCC)    2. Cerebrovascular accident (CVA), unspecified mechanism (CMS/HCC)        Chief Complaints:  Speech    Precautions: communication, cognition, safety      TODAY'S VISIT:    Session Time:  Start Time: 1210  Stop Time: 1300  Time Calculation (min): 50 min   History/Vitals/Pain/Encounter Info - 23 1213        Injury History/Precautions/Daily Required Info    Primary Therapist Magnolia Lundy MS,CCC/SLP     Chief Complaint/Reason for Visit  Speech     Onset of Illness/Injury or Date of Surgery 23     Referring Physician Dr. Haywood     Limitations/Impairments other (see comments)     Document Type daily treatment     Patient/Family/Caregiver Comments/Observations Per Thelma BECERRIL, pt reportedly would like to say 'Dick', 'Loan'. Pt was stimulable this date.     Patient reported fall since last visit No        Pain Assessment    Currently in pain No/Denies                Daily Treatment Assessment and Plan - 23 1213        Daily Treatment Assessment and Plan    Progress toward goals Progressing     Daily Outcome Summary Pt established functional phrases and stimulable for production of approximately one-third of them.  Pt noted with difficulty with understanding of direct model and repeating stimulus.  Pt benefited from NV praise and reinforcement as well as functional speech targets. Results of completion of AC subtests suggests severe AC deficits.     Plan and Recommendations Continue with reinforcement through success of tense vowels and pairing with /w/. Continue to stimulate for remainder of bilabials in isolation. Review Functional phrases.  Establish body parts for  bombardment.  Automatics for bombardment.                  OBJECTIVE MEASUREMENTS TAKEN TODAY:    OM: FCM/Voice/Neuro    Outcome Measures - 23 1657        Functional Communication Measures    FCM: Spoken Language, Comprehension 2-->Level 2                 Today's Treatment:    MOTOR/PRODUCTION SLP FLOW SHEET    SKILL AREA CURRENT SESSION   SPEECH PRODUCTION (MOTOR)  CPT 52486    Pt will produce bilabials /p, b, m, w/, tip alveolars /t, d, n/ and tense vowels /a, e, I, o,u/ in isolation provided mod A, 80% of trials.  2023  p- unable  b max A,   m- mod A, frequently substitutes /n/   w- Min A,  /a, e, I, o,u/- min to mod A   Pt will produce VC and CV syllables provided the above phonemes, Mod A, 80% of trials 2023  /w/ + vowels: Mod to max A   Pt will produce single and bisyllablic words consisting of CVCV, VCV and CVC, Mod A, 80% of presentations.    Pt will produce simple automatics provided mod A:  1-10  A-G  Yolis and identifiable syllable combinations for MALLY    Pt will produce small set of functional words/phrases in unison or in imitation with SLP, then produce 3  successive attempts with 80% accuracy  2023  Established list of functional words, names. See below.  Pt stimulable for .  Approximations for remainder.   Noted with difficulty with velars, blends in particular.     Auditory Comprehension and reading to be further assessed for determination of goals to support speech production    GOAL: 2023  Given direct model, repetition and NV reinforcement, Pt will follow 1-step commands for motor speech production up to 1-2 syllable words, 80% of presentations.  2023  Auditory comp,severe impairment:  Aphasia Severity Ratin/5-   All Communication is through fragmentary expression; great need for inference, questioning, and guessing by the listener.  The range of information that can be exchanged is limited, and the listener carries the burden of communication.    Provided  "pictures/objects, and/or body parts, pt will demonstrate  understanding of \"point to___\" and \" ___\" 85% of trials.     Education/Strategy Training    Other 8/25/2023  POA reports suspect decline in cognition recently.  Encouraged to report to MD and encourage improving sleep and nutrition routines.      Functional words and names: 8.25.2023  Dick Montes  Annia  Water      BDAE  Eval: 8/18/2023      Aphasia Severity Rating Scale 1/5    CONVERSATIONAL and EXPOSITORY SPEECH       Simple Social Responses Raw Score:   Percentiles:    Complexity Index  Raw Score:    Ratio:   Percentiles:     Narrative Discourse  Short form Raw Score:   Ratio:   Percentiles:     AUDITORY COMPREHEN     Basic Word Discrimination    Commands  Standard Form  8/25/2023     Complex Ideational Material  Short Form  8/25/2023             Raw Score: 29/37  Percentiles:20%     Raw Score: 3/15  Percentiles: 0-10%       Raw Score: 0/4  Percentiles: 0%                            ARTICULATION  Agility              ORAL EXPRESSION  Recitation, Yolis, Rhythm              Automatized Sequences  Standard Form   Recitation: 0  Percentiles: 30%tile  Yolis: 1  Percentiles: 30%tile  Rhythm: 1  Percentiles: 60%tile     Raw Score: 0/8  Percentiles: 0%tile       REPETITION  Words  Standard Form     Repetition of Nonsense words     Sentences  Standard Form    Raw Score: 0  Percentiles:0 %tile     Raw Score:  Percentiles:      Raw Score:  Percentiles:                          NAMING  Responsive Naming  Standard Form     Dufur Naming Test  Standard Form    Raw Score:   Percentiles:      Raw Score:   Stim cue:   PC:   MC:   Percentiles:   Mean:                 READING  Match case and scripts    Number matching    Picture-Word Matching    Oral Word Reading    Oral Sentence Reading    Oral Sentence Comp    Sentence/Paragraph    Comprehension   Raw Score: 7  Percentiles: 40%tile  Raw " Score:   Percentiles:   Raw Score: 10  Percentiles: 100%tile  Raw Score:   Percentiles:   Raw Score:   Percentiles:   Raw Score:   Percentiles:   Raw Score:   Percentiles:   Raw Score:   Percentiles:      WRITING  Form    Letter Choice    Motor Facility    Primer Words    Regular Phonics    Common Irregular Words    Written Picture Naming    Narrative Writing    Raw Score:   Percentiles:   Raw Score:   Percentiles:   Raw Score:   Percentiles:   Raw Score:   Percentiles:   Raw Score:   Percentiles:   Raw Score:   Percentiles:   Raw Score:   Percentiles:   Raw Score:   Percentiles:           IE: 2023   Rating Scale Profile of Speech Characteristics 1-7 scale   Articulatory Agility-phoneme and syllable level    Phrase Length: longest word runs    Grammatical Form: Variety and use of morphemes    Melodic Line (Prosody)    Paraphasia in Running Speech     Word Finding Relative to Fluency    Sentence Repetition    Auditory Comprehension       Aphasia Severity Ratin/5-   All Communication is through fragmentary expression; great need for inference, questioning, and guessing by the listener.  The range of information that can be exchanged is limited, and the listener carries the burden of communication.

## 2023-08-28 ENCOUNTER — OFFICE VISIT (OUTPATIENT)
Dept: NEUROLOGY | Facility: CLINIC | Age: 78
End: 2023-08-28
Payer: MEDICARE

## 2023-08-28 VITALS
SYSTOLIC BLOOD PRESSURE: 112 MMHG | WEIGHT: 121 LBS | TEMPERATURE: 96 F | OXYGEN SATURATION: 96 % | BODY MASS INDEX: 18.34 KG/M2 | HEIGHT: 68 IN | RESPIRATION RATE: 16 BRPM | DIASTOLIC BLOOD PRESSURE: 71 MMHG | HEART RATE: 60 BPM

## 2023-08-28 DIAGNOSIS — I51.3 LEFT VENTRICULAR APICAL THROMBUS: Primary | ICD-10-CM

## 2023-08-28 PROBLEM — I63.512 ACUTE ISCHEMIC LEFT MCA STROKE (CMS/HCC): Status: RESOLVED | Noted: 2023-07-01 | Resolved: 2023-08-28

## 2023-08-28 PROBLEM — I63.9 CEREBROVASCULAR ACCIDENT (CVA), UNSPECIFIED MECHANISM (CMS/HCC): Status: RESOLVED | Noted: 2023-06-26 | Resolved: 2023-08-28

## 2023-08-28 PROBLEM — Z86.73 HISTORY OF CARDIOEMBOLIC STROKE: Status: ACTIVE | Noted: 2023-08-28

## 2023-08-28 PROCEDURE — G8752 SYS BP LESS 140: HCPCS | Performed by: INTERNAL MEDICINE

## 2023-08-28 PROCEDURE — G8754 DIAS BP LESS 90: HCPCS | Performed by: INTERNAL MEDICINE

## 2023-08-28 PROCEDURE — 99215 OFFICE O/P EST HI 40 MIN: CPT | Performed by: INTERNAL MEDICINE

## 2023-08-28 NOTE — PROGRESS NOTES
Neurology Outpatient Consult    PATIENT NAME:  Jennifer Sams  MRN:  [id number]  DATE OF SERVICE:  8/28/2023    REASON FOR CONSULTATION/CHIEF COMPLAINT: Stroke Follow up     HISTORY OF PRESENT ILLNESS: This is a 78 y.o. right-handed female with history of abdominal aortic aneurysm, recent left MCA ischemic stroke June 2023 comes to the outpatient Vascular Neurology clinic for a follow-up.    Patient is accompanied to the visit by her friend.      Stroke Hx:  Patient was seen in the hospital in June 2023 for acute right hemiparesis and speech difficulty.  The patient was last seen normal 1 to 2 days prior to arrival and was found on the floor.  Imaging revealed a large left MCA completed infarct with mass effect.  Patient did not require any surgical intervention.  Further work-up revealed apical thrombus and the patient was not on anticoagulation.    Clinic visit  8/28/2023  Continues to have moderate to severe aphasia but according to friend, has made significant improvement in motor strength.  Patient is able to ambulate without assistant  Continues to have right upper extremity weakness, mild right lower extremity weakness and aphasia.  Getting outpatient speech and physical therapy.      REVIEW OF SYSTEMS:  Except as mentioned in the HPI, review of systems is essentially negative for the 10 major systems.     PAST MEDICAL HISTORY:    Past Medical History:   Diagnosis Date   • AAA (abdominal aortic aneurysm) (CMS/[facility])    • Arthritis    • Elevated blood pressure reading without diagnosis of hypertension 04/19/2019   • Epistaxis 01/06/2020   • GERD (gastroesophageal reflux disease) 04/19/2019   • Glaucoma 04/19/2019   • Heart murmur    • Hiatal hernia    • History of hip replacement, total, right 04/19/2019    Right hip 9/ 2016 and revision 2017    • History of rheumatic fever as a child 04/19/2019   • Hypercholesterolemia 04/19/2019   • Ischemic cardiomyopathy 05/09/2019   • Kidney cysts    • MI (myocardial  infarction) (CMS/[facility])    • Osteoarthritis of multiple joints 04/19/2019   • Osteoporosis    • Pacemaker 12/09/2019   • Raynaud's disease 04/19/2019   • RSD (reflex sympathetic dystrophy) 04/19/2019    Of left foot   • SOB (shortness of breath) 12/10/2019   • Status post insertion of drug eluting coronary artery stent 05/09/2019   • Stroke (CMS/[facility])        PAST SURGICAL HISTORY:    Past Surgical History:   Procedure Laterality Date   • CHOLECYSTECTOMY OPEN     • CORONARY ANGIOPLASTY WITH STENT PLACEMENT  04/29/2019    of LAD   • CORONARY ANGIOPLASTY WITH STENT PLACEMENT  04/30/2019    of RCA   • DILATION AND CURETTAGE OF UTERUS     • EYE SURGERY      RIGHT CATARACT   • FOOT SURGERY Left    • JOINT REPLACEMENT Right 09/2016    total hip   • REVISION TOTAL HIP ARTHROPLASTY Right 2017   • TONSILLECTOMY         ALLERGIES:    Allergies   Allergen Reactions   • Ace Inhibitors Other (see comments)     cough   • Atorvastatin      Nausea and can'trecall   • Brilinta [Ticagrelor]      SOB   • Codeine      Nausea and Vomiting   • Dilaudid [Hydromorphone]      Nausea & vomiting   • Morphine Sulfate Nausea Only and GI intolerance   • Onion      Severe abdominal pain   • Oxycodone      Nausea & vomiting, dizziness       FAMILY HISTORY:    Family History   Problem Relation Age of Onset   • Congenital heart disease Biological Mother         noted at autopsy   • Pulmonary fibrosis Biological Father    • Heart attack Paternal Grandfather        SOCIAL HISTORY:    Social History     Tobacco Use   • Smoking status: Never   • Smokeless tobacco: Never   Vaping Use   • Vaping Use: Never used   Substance Use Topics   • Alcohol use: Not Currently   • Drug use: No        PHYSICAL EXAMS:  Temp:  [35.6 °C (96 °F)] 35.6 °C (96 °F)  Heart Rate:  [60] 60  Resp:  [16] 16  BP: (112)/(71) 112/71    General appearance: NAD, conversant  Eyes: anicteric sclerae, moist conjunctivae; no lid-lag;   HENT: Atraumatic; oropharynx clear with moist mucous  membranes and no mucosal ulcerations; normal hard and soft palate  Neck: Trachea midline; , supple.  Lungs: CTA, with normal respiratory effort and no intercostal retractions  CV: RRR, no MRGs    Neurological Examination  Patient is awake and alert  Severe aphasia: Frequent paraphasic errors and speech perseveration. unable to name  Moderate dysarthria    Cranial nerves :  Pupils are equal, round, and reactive to light and accommodation bilaterally.    Eye movements are full without nystagmus.   Visual fields are full.    Facial sensation is intact bilaterally    Facial strength right lower facial droop  The palate was midline.    Hearing is intact.     Shoulder shrug is normal.  The tongue is midline.       Motor:   Right upper extremity: Unable to extend at the wrist, hand  1/5.  Rest of right upper extremity 3/5  Right lower extremity 4-/5  Left upper and lower extremities 5/5    Sensory exam:  Sensation is intact to light touch on the upper and lower extremities.     Cerebellar:  Finger to Nose intact on the left side.  Unable to perform on the right side due to weakness    NIH Stroke Scale    Interval: 2023  Time:   Person Administering Scale: Ney Aparicio     1a  Level of consciousness: 0=alert; keenly responsive   1b. LOC questions:  2=Performs neither task correctly   1c. LOC commands: 0=Performs both tasks correctly   2.  Best Gaze: 0=normal   3. Visual: 0=No visual loss   4. Facial Palsy: 2=Partial paralysis (total or near total paralysis of the lower face)   5a.  Motor left arm: 0=No drift, limb holds 90 (or 45) degrees for full 10 seconds   5b.  Motor right arm: 2=Some effort against gravity, limb cannot get to or maintain (if cured) 90 (or 45) degrees, drifts down to bed, but has some effort against gravity   6a. motor left le=No drift, limb holds 90 (or 45) degrees for full 10 seconds   6b  Motor right le=Drift, limb holds 90 (or 45) degrees but drifts down before full 10 seconds:  does not hit bed   7. Limb Ataxia: 0=Absent   8.  Sensory: 0=Normal; no sensory loss   9. Best Language:  2=Moderate aphasia   10. Dysarthria: 1=Mild to moderate, patient slurs at least some words and at worst, can be understood with some difficulty   11. Extinction and Inattention: 0=No abnormality    Total:   10       PERTINENT DIAGNOSTIC TESTS:      IMAGING:  I have personally reviewed the images for the studies listed below.      CTA head and neck 6/26/2023  IMPRESSION:  1. Atherosclerosis at the carotid bifurcations with mild luminal narrowing  measuring approximately 25% on the right and 20% on the left using NASCET  criteria. Patent cervical carotid and vertebral arteries.  2. Termination of flow in an M3 branch of the left middle cerebral artery.  3. Intracranial atherosclerosis with significant narrowing involving the left V4  segment after the takeoff of the posterior inferior cerebellar artery. Mild  irregularity of the basilar artery.  4. Completed infarct in the posterior aspect of the left MCA territory. No  evidence of ischemic penumbra.  rCBF <30% : 0 cc  TMax > 6s: 2 cc  Mismatch volume: 2 cc  Mismatch Ratio: NA  5. Additional chronic and incidental findings, as above.     CT head w/o contrast 6/27/2023  IMPRESSION: Acute/subacute left MCA distribution infarct with 3 mm midline  shift.. No definite hemorrhage    LDL 88  HbA1c 5.4     I have personally reviewed the above imaging, procedure and lab studies.     OTHER RESULTS:  TTE 6/27/2023  • Left ventricle with normal dimensions and regional contraction abnormalities consistent with CAD in the LAD distribution: Localized apical infarction with aneurysm formation.  Paradoxical septal movement is consistent with ventricular pacing.  There is moderate left ventricular systolic and mild diastolic dysfunction.  LVEF 43%  • Left ventricular apical thrombus  • Left atrial pressure 14 mmHg  • There is a visual suggestion of mild aortic stenosis. (Doppler  assessment of the aortic valve is suboptimal)  • Mild mitral regurgitation  • Normal right ventricle dimensions and systolic function: TAPSE 1.84 cm  • Mild tricuspid regurgitation  • PASP 34 mmHg  • Normal left atrial volume index  • No pericardial effusion or vegetations  • AS-V pacing  • Technically difficult study: No parasternal imaging obtained  • This study was performed with Definity contrast to optimize evaluation of regional and global left ventricular function  • No significant change from 3/28/2023 except for current demonstration of left ventricular apical thrombus           ASSESSMENT:  78 y.o. right-handed female with history of abdominal aortic aneurysm, recent left MCA ischemic stroke June 2023 comes to the outpatient Vascular Neurology clinic for a follow-up.    Patient was recently admitted to the hospital for acute left MCA ischemic stroke.   Unclear last known well so not a candidate for IV thrombolytics.  Patient remained clinically stable and did not require any surgical intervention.  She was discharged on apixaban after transthoracic echo showed LV apical thrombus.    Problem list  Recent left MCA ischemic stroke with hemorrhagic conversion complicated by mass effect and  Left ventricular apical thrombus     NIHSS 10  mRS 2        RECOMMENDATIONS:      Etiology of stroke  cardioembolic  Continue apixaban 5 mg twice daily  Continue rosuvastatin 20 mg daily  Goal blood pressure normotension,Goal LDL < 70 and normoglycemia  continue outpatient PT/OT/SLP                       We had a lengthy discussion regarding the importance of identification of stroke symptoms, including weakness dysarthria, sensory loss, change in vision, language problems and other new acute neurological symptoms. We reviewed the importance of seeking immediate medical attention should these symptoms occur.     Follow up in 6 months       Total time spent 52 minutes on this encounter which includes face-to-face with the  patient, independent review of the test, obtaining history, performing medical examination, and care coordination.  More than half of the time was spent on counseling and coordination of care.       I have personally seen this patient, reviewed the history and all the available data, performed the physical examination as documented above, and formulated the plan of care. Also discussed with patient about the side effects of the medications. All questions the patient and her/his family have are answered.    Thank you for allowing me to participate in the care of your patient.  If you have any further questions, please do not hesitate to contact me.    This encounter was completed utilizing the Direct Speech Voice Recognition Software. Grammatical errors, random word insertions, pronoun errors, and incomplete sentences are occasional consequences of the system due to software limitation, ambient noise, and hardware issues. These may be missed by proof reading prior to affixing electronic signature. Any questions or concerns about the content, text, or information contained within the body of this dictation should be directly addressed to the physician for clarification. If you have any questions or concerns please do not hesitate to call me directly.       Ney Aparicio MD  Vascular Neurology

## 2023-08-29 ENCOUNTER — HOSPITAL ENCOUNTER (OUTPATIENT)
Dept: OCCUPATIONAL THERAPY | Age: 78
Setting detail: THERAPIES SERIES
Discharge: HOME | End: 2023-08-29
Attending: PHYSICAL MEDICINE & REHABILITATION
Payer: MEDICARE

## 2023-08-29 DIAGNOSIS — I63.512 ACUTE ISCHEMIC LEFT MCA STROKE (CMS/HCC): Primary | ICD-10-CM

## 2023-08-29 DIAGNOSIS — M62.81 MUSCLE WEAKNESS (GENERALIZED): ICD-10-CM

## 2023-08-29 PROCEDURE — 97112 NEUROMUSCULAR REEDUCATION: CPT | Mod: GO

## 2023-08-29 NOTE — OP OT TREATMENT LOG
OT NEURO FLOW SHEET     EXERCISES CURRENT SESSION TIME   Initial evaluation 8/16/23    NEURO RE-ED  CPT 87743 TOTAL TIME FOR SESSION   55 Minutes   AAROM/AROM and PROM      Spasticity management      Strengthening       Strength Performed right hand gross grasp of small balls and released to roll across table top x 20 repetitions with maximal cues for right hand use.      Gross Motor Control Dynavision, Mode B, LUE  -38/38, 1.56 sec  -47/47, 1.27 sec    Standing level wall balls x 15 repetitions x 3 trials. Then used bilateral hands to grasp flex bar and hit light weight ball using chest press movement x 8 repetitions before losing grasp with right hand x 3 trials.      Fine Motor Control Performed RUE reaching through 100 degrees to retrieve balls with 1 inch to 3 inch diameter and place in target area with 75% accuracy.     RUE reach to midline and grasp cards and then stack with 80% accuracy.     Patient attempting to verbalize discomfort in right hand SM. Observed triggering on SM. Discussed trigger finger and benefit of tear drop splint. Plan to fabricate as supplies become available.      Sensory re-education Provided deep pressure and stroking movements for edema management, intrinsic stretching, interosseous stretching and sensory re-education to right forearm and hand x 5 minutes.     Sitting Jennifer/Balance       Standing Jennifer/Balance       WT Bearing       Graded motor imagery       THERE ACT  CPT 81142 TOTAL TIME FOR SESSION 0-7 Minutes   Pain, Vitals, Meds, Etc. Reviewed     Transfers       THERE EX  CPT 13266 TOTAL TIME FOR SESSION 0-7 Minutes   PROM       Activity Tolerance       SELF-CARE  CPT 30596 TOTAL TIME FOR SESSION  Minutes   Symptom Management       Pt Education/Safety          Transfer Training       ADL Training      IADL Training      MODALITIES  CPT 57046 TOTAL TIME FOR SESSION Not performed   Ice/Heat       Attended E-Stim       SPLINTING  CPT 86313 TOTAL TIME FOR SESSION Not performed    Fabrication/Check Out       Fit       Training

## 2023-08-29 NOTE — PROGRESS NOTES
Occupational Therapy Visit    OT DAILY NOTE FOR OUTPATIENT THERAPY    Patient: Jennifer Sams   MRN: [id number]  : 1945 78 y.o.  Referring Physician: Liv Haywood, Debra  Date of Visit: 2023      Certification Dates: 23 through 23    Diagnosis:   1. Acute ischemic left MCA stroke (CMS/HCC)    2. Muscle weakness (generalized)        Chief Complaints:   Brain Injury    Precautions:       TODAY'S VISIT    Time In Session:  Start Time: 1805  Stop Time: 1900  Time Calculation (min): 55 min   History/Vitals/Pain/Encounter Info - 23 180        Injury History/Precautions/Daily Required Info    Document Type daily treatment     Chief Complaint/Reason for Visit  Brain Injury     Onset of Illness/Injury or Date of Surgery 23     Referring Physician Dr. Haywood     History of present illness/functional impairment The patient is a 78-year-old  female with a history of AAA, heart block s/p cardiac pacemaker, glaucoma, hyperlipidemia, coronary artery disease, NSTEMI, who presented to [facility] on  after she has not been seen by her friends for a few days.  Patient lives alone and when she was found by EMS she was confused and combative.  In the emergency department she was aphasic with right-sided weakness and hypertensive to the 190 systolic.  CT scan of the head revealed an acute infarct in left MCA with mild bleeding.  Echo showed an EF of 43% and a bubble study was negative.  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  Later this was transitioned to apixaban.  She was continued on statin for secondary prevention.  She required virtual shivani while in the hospital but this was not effective.  She was evaluated by speech therapy and cleared for a soft and bite-size diet with moderately thick liquids.'     Patient/Family/Caregiver Comments/Observations Patient arrives with  friend/JONE Renee.     Patient reported fall since last visit No        Pain Assessment    Currently in pain No/Denies                Daily Treatment Assessment and Plan - 08/29/23 1806        Daily Treatment Assessment and Plan    Progress toward goals Progressing     Daily Outcome Summary Patient arrives with JONE and Thelma kramer today for OT. Patient frequently attempting to communication but limited by aphasia. She appears frustrated and frequently trying to ask questions about sensory changes and pain at right hand SM. Noted trigger finger and would benefit from tear drop splinting. Educated on apple pickers for edema management and AROM. Session addressed right hand reaching and grasping, visual scanning to right side, object identification with attempts at naming verbally. Benefits from visual demonstration and clear exit criteria for participation.     Plan and Recommendations Incorporate rehabilitative and compensatory strategies for RUE sensory and motor recovery                     OBJECTIVE DATA TAKEN TODAY    None Taken    Today's Treatment:    OT NEURO FLOW SHEET     EXERCISES CURRENT SESSION TIME   Initial evaluation 8/16/23    NEURO RE-ED  CPT 70326 TOTAL TIME FOR SESSION   55 Minutes   AAROM/AROM and PROM      Spasticity management      Strengthening       Strength Performed right hand gross grasp of small balls and released to roll across table top x 20 repetitions with maximal cues for right hand use.      Gross Motor Control Dynavision, Mode B, LUE  -38/38, 1.56 sec  -47/47, 1.27 sec    Standing level wall balls x 15 repetitions x 3 trials. Then used bilateral hands to grasp flex bar and hit light weight ball using chest press movement x 8 repetitions before losing grasp with right hand x 3 trials.      Fine Motor Control Performed RUE reaching through 100 degrees to retrieve balls with 1 inch to 3 inch diameter and place in target area with 75% accuracy.     RUE reach to midline and grasp  cards and then stack with 80% accuracy.     Patient attempting to verbalize discomfort in right hand SM. Observed triggering on SM. Discussed trigger finger and benefit of tear drop splint. Plan to fabricate as supplies become available.      Sensory re-education Provided deep pressure and stroking movements for edema management, intrinsic stretching, interosseous stretching and sensory re-education to right forearm and hand x 5 minutes.     Sitting Jennifer/Balance       Standing Jennifer/Balance       WT Bearing       Graded motor imagery       THERE ACT  CPT 69774 TOTAL TIME FOR SESSION 0-7 Minutes   Pain, Vitals, Meds, Etc. Reviewed     Transfers       THERE EX  CPT 54718 TOTAL TIME FOR SESSION 0-7 Minutes   PROM       Activity Tolerance       SELF-CARE  CPT 35288 TOTAL TIME FOR SESSION  Minutes   Symptom Management       Pt Education/Safety          Transfer Training       ADL Training      IADL Training      MODALITIES  CPT 78528 TOTAL TIME FOR SESSION Not performed   Ice/Heat       Attended E-Stim       SPLINTING  CPT 53764 TOTAL TIME FOR SESSION Not performed   Fabrication/Check Out       Fit       Training

## 2023-09-06 ENCOUNTER — DOCUMENTATION (OUTPATIENT)
Dept: PHYSICAL THERAPY | Age: 78
End: 2023-09-06
Payer: MEDICARE

## 2023-09-06 ENCOUNTER — HOSPITAL ENCOUNTER (OUTPATIENT)
Dept: SPEECH THERAPY | Age: 78
Setting detail: THERAPIES SERIES
Discharge: HOME | End: 2023-09-06
Attending: PHYSICAL MEDICINE & REHABILITATION
Payer: MEDICARE

## 2023-09-06 DIAGNOSIS — I63.9 CEREBROVASCULAR ACCIDENT (CVA), UNSPECIFIED MECHANISM (CMS/HCC): ICD-10-CM

## 2023-09-06 DIAGNOSIS — I63.512 ACUTE ISCHEMIC LEFT MCA STROKE (CMS/HCC): Primary | ICD-10-CM

## 2023-09-06 PROCEDURE — 92507 TX SP LANG VOICE COMM INDIV: CPT | Mod: GN

## 2023-09-06 NOTE — PROGRESS NOTES
Physical Therapy Discharge      PT DISCHARGE NOTE FOR OUTPATIENT THERAPY    Patient: Jennifer Sams MRN: [id number]  : 1945 78 y.o.  Referring Physician: No ref. provider found  Date of Visit: 2023      Certification Dates: 23 through 23    Total Visit Count: 2    Chief Complaints:  Chief Complaint   Patient presents with    PT Discharge     Spoke with pt and POA in clinic lobby inquiring about no scheduled appts within 4wk timeframe w/ pt missing 4 weeks since last and next visit.  POA stating 'it's too much with PT/OT/SLP'.  PT in agreement that OT and SLP more of a priority and to obtain new Rx for PT when appropriate.  POA and pt in agreement for DC at present time.       Precautions:         TODAY'S VISIT:    Time In Session:            PT - 23 1443        Physical Therapy    Physical Therapy Specialty Traditional Neuro PT        PT Plan    Frequency of treatment 2 times/week     PT Duration 3 months     PT Cert From 23     PT Cert To 23     Date PT POC was sent to provider 23     Signed PT Plan of Care received?  Yes                   OBJECTIVE MEASUREMENTS/DATA:    None taken    ROM and MMT        2023    17:23   PT LE MMT   Right Hip Flexion (4+/5) good plus   Left Hip Flexion (5/5) normal   Right Hip ABD (2+/5) poor plus   Left Hip ABD (2+/5) poor plus   Right Hip ADD (2+/5) poor plus   Left Hip ADD (2+/5) poor plus   Right Knee Flexion (5/5) normal   Left Knee Flexion (5/5) normal   Right Knee Extension (5/5) normal   Left Knee Extension (5/5) normal   Right Ankle DF (5/5) normal   Left Ankle DF (5/5) normal   Right Ankle PF (5/5) normal   Left Ankle PF (5/5) normal     Outcome Measures        2023    17:23 2023    13:07 2023    13:17   PT OBJECTIVE Outcome Measures   6 Minute Walk Test   1193ft (reporting onset of LBP at 3min 48s)   2 Minute Walk Test   402ft   10 Meter Walk Test   1.12 meters/sec   Five Times Sit to Stand   15.65    30 Second Sit to Stand  8 repetitions 8 repetitions   Gutierrez Balance Score   50   FGA 18         Today's Treatment:    Education provided:  None/NA         Goals Addressed                 This Visit's Progress     COMPLETED: Patient specific goal-PT        Patient specific goal (confirmed via POA) to return to optimal safety to be alone w/cat in 2SH, to get stronger, to get the right side to work       COMPLETED: PT-LMCA infarct        3 therapies too much per POA at present time.  POA and pt in agreement for DC at present time.    Short Term Goals Time Frame Result Comment/Progress   Patient will be free of falls. 4wks     Patient will perform functional transfers independently.      Patient will perform stair negotiation safely w/rail 4wks     Pt will achieve 5xSTS within 15s with maintaining proper /neutral hip alignmt w/o R adduction. 4wks     Pt will negotiate FF stairs w/1 rail with S via reciprocal pattern, cues for safety. 4wks     Pt will demonstrate improved static standing balance via Gutierrez >/= 7 pts for MDC. 4wks     Pt will demonstrate improved dynamic balance and decreased risk for falls via FGA >/= 21/30. 4wks     Progress functional endurance via 2MWT to >/= TBD / MDC of 69ft, w/o LOB 4wks     Progress comfortable gait speed by 0.10m/s to achieve MDC. 4wks     Pt will be mod I with updated HEP 4wks     Pt will achieve WNL on SOT to demonstrate no balance system asymmetries. 4wks     Pt will achieve gain to 5/5 MMT BLE to improve ease of functional mobility. 4wks       Long Term Goals Time Frame Result Comment/Progress   Patient will be free of falls. 8-12wks     Patient will be independent for functional mobility w/in the community. 8-12wks     Patient will perform functional and floor transfers independently. 8-12wks     Pt will achieve 5xSTS within 11s with maintaining proper /neutral hip alignmt w/o R adduction. 8-12wks     Pt will negotiate FF stairs w/1 rail with mod I via reciprocal pattern,  cues for safety. 8-12wks     Pt will demonstrate improved static standing balance via Gutierrez >/= 52 to lessen fall risk. 8-12wks     Pt will demonstrate improved dynamic balance and decreased risk for falls via FGA >/= 25/30. 8-12wks     Progress functional endurance to be able to achieve age norm for 6MWT of 1---ft. 8-12wks     Progress comfortable gait speed to 1.--m/s to achieve age norm. 8-12wks     Pt will be mod I with updated HEP 8-12wks     Pt will achieve WNL on SOT to demonstrate no balance system asymmetries. 8-12wks     Pt will achieve gain to 5/5 MMT BLE to improve ease of functional mobility. 8-12wks                  Discharge information for [facility]:    Reason for D/C: Patient has not returned  Was care interrupted for medical reason?: No  Did the patient demonstrate increased independence: Yes  Has the patient returned to productive activity?: Yes  Skin integrity: Unable to assess

## 2023-09-06 NOTE — OP SLP TREATMENT LOG
MOTOR/PRODUCTION SLP FLOW SHEET    SKILL AREA CURRENT SESSION   SPEECH PRODUCTION (MOTOR)  CPT 41235    Pt will produce bilabials /p, b, m, w/, tip alveolars /t, d, n/ and tense vowels /a, e, I, o,u/ in isolation provided mod A, 80% of trials.  2023  I can't, wait, are you kidding me?, please, bye  /b/: isolation- with tactile cues  /m/:isolation- with tactile cues  /w/: isolation- with tactile cues.   /p/: isolation following auditory bombardment.   /n/: isolation: with placement cues.  Vowels: 100% with decreased rate and DM. Rapid 80%, 60%    2023  p- unable  b max A,   m- mod A, frequently substitutes /n/   w- Min A,  /a, e, I, o,u/- min to mod A   Pt will produce VC and CV syllables provided the above phonemes, Mod A, 80% of trials 2023  /w/ + vowels    2023  /w/ + vowels: Mod to max A   Pt will produce single and bisyllablic words consisting of CVCV, VCV and CVC, Mod A, 80% of presentations.    Pt will produce simple automatics provided mod A:  1-10  A-G  Yolis and identifiable syllable combinations for MALLY 2023  Approximated for 70% of 1-10   Pt will produce small set of functional words/phrases in unison or in imitation with SLP, then produce 3  successive attempts with 80% accuracy  2023  Produced 8/14 functional words producing all phonemes.     2023  Established list of functional words, names. See below.  Pt stimulable for 16.  Approximations for remainder.   Noted with difficulty with velars, blends in particular.     Auditory Comprehension and reading to be further assessed for determination of goals to support speech production    GOAL: 2023  Given direct model, repetition and NV reinforcement, Pt will follow 1-step commands for motor speech production up to 1-2 syllable words, 80% of presentations.  2023  Auditory comp,severe impairment:  Aphasia Severity Ratin/5-   All Communication is through fragmentary expression; great need for inference,  "questioning, and guessing by the listener.  The range of information that can be exchanged is limited, and the listener carries the burden of communication.    Provided pictures/objects, and/or body parts, pt will demonstrate  understanding of \"point to___\" and \" ___\" 85% of trials.  9/6/2023  Requires: direct model, repetition NV feedback to place tongue depressor in position for labial proprioception.    Education/Strategy Training    Other 8/25/2023  POA reports suspect decline in cognition recently.  Encouraged to report to MD and encourage improving sleep and nutrition routines.      Functional words and names: 8.25.2023  Dick Milner  Water      BDAE  Eval: 8/18/2023      Aphasia Severity Rating Scale 1/5    CONVERSATIONAL and EXPOSITORY SPEECH       Simple Social Responses Raw Score:   Percentiles:    Complexity Index  Raw Score:    Ratio:   Percentiles:     Narrative Discourse  Short form Raw Score:   Ratio:   Percentiles:     AUDITORY COMPREHEN     Basic Word Discrimination    Commands  Standard Form  8/25/2023     Complex Ideational Material  Short Form  8/25/2023             Raw Score: 29/37  Percentiles:20%     Raw Score: 3/15  Percentiles: 0-10%       Raw Score: 0/4  Percentiles: 0%                            ARTICULATION  Agility              ORAL EXPRESSION  Recitation, Yolis, Rhythm              Automatized Sequences  Standard Form   Recitation: 0  Percentiles: 30%tile  Yolis: 1  Percentiles: 30%tile  Rhythm: 1  Percentiles: 60%tile     Raw Score: 0/8  Percentiles: 0%tile       REPETITION  Words  Standard Form     Repetition of Nonsense words     Sentences  Standard Form    Raw Score: 0  Percentiles:0 %tile     Raw Score:  Percentiles:      Raw Score:  Percentiles:                          NAMING  Responsive Naming  Standard Form     Abernathy Naming Test  Standard Form    Raw Score:   Percentiles:      Raw Score: "   Stim cue:   PC:   MC:   Percentiles:   Mean:                 READING  Match case and scripts    Number matching    Picture-Word Matching    Oral Word Reading    Oral Sentence Reading    Oral Sentence Comp    Sentence/Paragraph    Comprehension   Raw Score: 7  Percentiles: 40%tile  Raw Score:   Percentiles:   Raw Score: 10  Percentiles: 100%tile  Raw Score:   Percentiles:   Raw Score:   Percentiles:   Raw Score:   Percentiles:   Raw Score:   Percentiles:   Raw Score:   Percentiles:      WRITING  Form    Letter Choice    Motor Facility    Primer Words    Regular Phonics    Common Irregular Words    Written Picture Naming    Narrative Writing    Raw Score:   Percentiles:   Raw Score:   Percentiles:   Raw Score:   Percentiles:   Raw Score:   Percentiles:   Raw Score:   Percentiles:   Raw Score:   Percentiles:   Raw Score:   Percentiles:   Raw Score:   Percentiles:           IE: 2023   Rating Scale Profile of Speech Characteristics 1-7 scale   Articulatory Agility-phoneme and syllable level 1/7   Phrase Length: longest word runs 2/7   Grammatical Form: Variety and use of morphemes 2/7   Melodic Line (Prosody) 1/   Paraphasia in Running Speech 1/7    Word Finding Relative to Fluency 1/7   Sentence Repetition 1/7   Auditory Comprehension /      Aphasia Severity Ratin/5-   All Communication is through fragmentary expression; great need for inference, questioning, and guessing by the listener.  The range of information that can be exchanged is limited, and the listener carries the burden of communication.

## 2023-09-06 NOTE — PROGRESS NOTES
"Speech-Language Pathology Visit      SLP DAILY NOTE FOR OUTPATIENT THERAPY    Patient: Jennifer Sams   MRN: [id number]  : 1945 78 y.o.  Referring Physician: Liv Haywood, Debra  Date of Visit: 2023      Certification Dates: 23 through 23    Diagnosis:   1. Acute ischemic left MCA stroke (CMS/HCC)    2. Cerebrovascular accident (CVA), unspecified mechanism (CMS/HCC)        Chief Complaints:  Speech    Precautions:      TODAY'S VISIT:    Session Time:  Start Time: 1500  Stop Time: 1600  Time Calculation (min): 60 min   History/Vitals/Pain/Encounter Info - 23 1503        Injury History/Precautions/Daily Required Info    Primary Therapist Magnolia Lundy MS,CCC/SLP     Chief Complaint/Reason for Visit  Speech     Onset of Illness/Injury or Date of Surgery 23     Referring Physician Dr. Haywood     Document Type daily treatment     Patient/Family/Caregiver Comments/Observations Thelma BECERRIL, reported the word 'basically\".     Patient reported fall since last visit No        Pain Assessment    Currently in pain No/Denies                Daily Treatment Assessment and Plan - 23 1503        Daily Treatment Assessment and Plan    Progress toward goals Progressing     Daily Outcome Summary Pt requires direct model, repetition, NV feedback, auditory bombardment to maximize productions.     Plan and Recommendations auditory bombardment of bilabials, bilabial proprioception.  Blend vowels as able, Functional words.  Automatics- 1-10                  OBJECTIVE MEASUREMENTS TAKEN TODAY:    None Taken    Today's Treatment:    MOTOR/PRODUCTION SLP FLOW SHEET    SKILL AREA CURRENT SESSION   SPEECH PRODUCTION (MOTOR)  CPT 45595    Pt will produce bilabials /p, b, m, w/, tip alveolars /t, d, n/ and tense vowels /a, e, I, o,u/ in isolation provided mod A, 80% of trials.  2023  I can't, wait, are you kidding me?, please, bye  /b/: isolation- with tactile cues  /m/:isolation- with " "tactile cues  /w/: isolation- with tactile cues.   /p/: isolation following auditory bombardment.   /n/: isolation: with placement cues.  Vowels: 100% with decreased rate and DM. Rapid 80%, 60%    2023  p- unable  b max A,   m- mod A, frequently substitutes /n/   w- Min A,  /a, e, I, o,u/- min to mod A   Pt will produce VC and CV syllables provided the above phonemes, Mod A, 80% of trials 2023  /w/ + vowels    2023  /w/ + vowels: Mod to max A   Pt will produce single and bisyllablic words consisting of CVCV, VCV and CVC, Mod A, 80% of presentations.    Pt will produce simple automatics provided mod A:  1-10  A-G  Yolis and identifiable syllable combinations for MALLY 2023  Approximated for 70% of 1-10   Pt will produce small set of functional words/phrases in unison or in imitation with SLP, then produce 3  successive attempts with 80% accuracy  2023  Produced 8/14 functional words producing all phonemes.     2023  Established list of functional words, names. See below.  Pt stimulable for .  Approximations for remainder.   Noted with difficulty with velars, blends in particular.     Auditory Comprehension and reading to be further assessed for determination of goals to support speech production    GOAL: 2023  Given direct model, repetition and NV reinforcement, Pt will follow 1-step commands for motor speech production up to 1-2 syllable words, 80% of presentations.  2023  Auditory comp,severe impairment:  Aphasia Severity Ratin/5-   All Communication is through fragmentary expression; great need for inference, questioning, and guessing by the listener.  The range of information that can be exchanged is limited, and the listener carries the burden of communication.    Provided pictures/objects, and/or body parts, pt will demonstrate  understanding of \"point to___\" and \" ___\" 85% of trials.  2023  Requires: direct model, repetition NV feedback to place tongue " depressor in position for labial proprioception.    Education/Strategy Training    Other 8/25/2023  POA reports suspect decline in cognition recently.  Encouraged to report to MD and encourage improving sleep and nutrition routines.      Functional words and names: 8.25.2023  Dick Chorafal  Echo  Ryan Peterson      BDAE  Eval: 8/18/2023      Aphasia Severity Rating Scale 1/5    CONVERSATIONAL and EXPOSITORY SPEECH       Simple Social Responses Raw Score:   Percentiles:    Complexity Index  Raw Score:    Ratio:   Percentiles:     Narrative Discourse  Short form Raw Score:   Ratio:   Percentiles:     AUDITORY COMPREHEN     Basic Word Discrimination    Commands  Standard Form  8/25/2023     Complex Ideational Material  Short Form  8/25/2023             Raw Score: 29/37  Percentiles:20%     Raw Score: 3/15  Percentiles: 0-10%       Raw Score: 0/4  Percentiles: 0%                            ARTICULATION  Agility              ORAL EXPRESSION  Recitation, Yolis, Rhythm              Automatized Sequences  Standard Form   Recitation: 0  Percentiles: 30%tile  Yolis: 1  Percentiles: 30%tile  Rhythm: 1  Percentiles: 60%tile     Raw Score: 0/8  Percentiles: 0%tile       REPETITION  Words  Standard Form     Repetition of Nonsense words     Sentences  Standard Form    Raw Score: 0  Percentiles:0 %tile     Raw Score:  Percentiles:      Raw Score:  Percentiles:                          NAMING  Responsive Naming  Standard Form     [place] Naming Test  Standard Form    Raw Score:   Percentiles:      Raw Score:   Stim cue:   PC:   MC:   Percentiles:   Mean:                 READING  Match case and scripts    Number matching    Picture-Word Matching    Oral Word Reading    Oral Sentence Reading    Oral Sentence Comp    Sentence/Paragraph    Comprehension   Raw Score: 7  Percentiles: 40%tile  Raw Score:   Percentiles:   Raw Score: 10  Percentiles: 100%tile  Raw Score:    Percentiles:   Raw Score:   Percentiles:   Raw Score:   Percentiles:   Raw Score:   Percentiles:   Raw Score:   Percentiles:      WRITING  Form    Letter Choice    Motor Facility    Primer Words    Regular Phonics    Common Irregular Words    Written Picture Naming    Narrative Writing    Raw Score:   Percentiles:   Raw Score:   Percentiles:   Raw Score:   Percentiles:   Raw Score:   Percentiles:   Raw Score:   Percentiles:   Raw Score:   Percentiles:   Raw Score:   Percentiles:   Raw Score:   Percentiles:           IE: 2023   Rating Scale Profile of Speech Characteristics 1-7 scale   Articulatory Agility-phoneme and syllable level    Phrase Length: longest word runs    Grammatical Form: Variety and use of morphemes 7   Melodic Line (Prosody)    Paraphasia in Running Speech     Word Finding Relative to Fluency    Sentence Repetition    Auditory Comprehension       Aphasia Severity Ratin/5-   All Communication is through fragmentary expression; great need for inference, questioning, and guessing by the listener.  The range of information that can be exchanged is limited, and the listener carries the burden of communication.

## 2023-09-07 ENCOUNTER — HOSPITAL ENCOUNTER (OUTPATIENT)
Dept: OCCUPATIONAL THERAPY | Age: 78
Setting detail: THERAPIES SERIES
Discharge: HOME | End: 2023-09-07
Attending: PHYSICAL MEDICINE & REHABILITATION
Payer: MEDICARE

## 2023-09-07 DIAGNOSIS — I63.512 ACUTE ISCHEMIC LEFT MCA STROKE (CMS/HCC): Primary | ICD-10-CM

## 2023-09-07 DIAGNOSIS — M62.81 MUSCLE WEAKNESS (GENERALIZED): ICD-10-CM

## 2023-09-07 PROCEDURE — 97530 THERAPEUTIC ACTIVITIES: CPT | Mod: GO

## 2023-09-07 PROCEDURE — 97112 NEUROMUSCULAR REEDUCATION: CPT | Mod: GO

## 2023-09-07 NOTE — OP OT TREATMENT LOG
OT NEURO FLOW SHEET     EXERCISES CURRENT SESSION TIME   Initial evaluation 8/16/23    NEURO RE-ED  CPT 89126 TOTAL TIME FOR SESSION   45 Minutes   AAROM/AROM and PROM Fist open/close in elevation for improved ROM at right hand and edema management     Spasticity management      Strengthening       Strength Patient performed PNF movements in standing with light ball and resistance ball; she required mod verbal and physical cueing     Gross Motor Control  BITS program for functional, goal-directed reach and tap with use of RUE x 3 rounds    Grasp and reach activity with soft balls  - Exercises graded up with bounce and toss to therapist      Fine Motor Control      Sensory re-education Provided deep pressure and massage at right hand for sensory re-educataion    Sitting Jennifer/Balance       Standing Jennifer/Balance       WT Bearing       Graded motor imagery       THERE ACT  CPT 05500 TOTAL TIME FOR SESSION  10 Minutes   Pain, Vitals, Meds, Etc. Vitals taken, pain assessed and monitored     Transfers       THERE EX  CPT 77235 TOTAL TIME FOR SESSION 0-7 Minutes   PROM       Activity Tolerance       SELF-CARE  CPT 03046 TOTAL TIME FOR SESSION  Minutes   Symptom Management       Pt Education/Safety          Transfer Training       ADL Training      IADL Training      MODALITIES  CPT 70249 TOTAL TIME FOR SESSION Not performed   Ice/Heat       Attended E-Stim       SPLINTING  CPT 40628 TOTAL TIME FOR SESSION Not performed   Fabrication/Check Out       Fit       Training

## 2023-09-07 NOTE — PROGRESS NOTES
Occupational Therapy Visit    OT DAILY NOTE FOR OUTPATIENT THERAPY    Patient: Jennifer Sams   MRN: [id number]  : 1945 78 y.o.  Referring Physician: Liv Haywood, Debra  Date of Visit: 2023      Certification Dates: 23 through 23    Diagnosis:   1. Acute ischemic left MCA stroke (CMS/HCC)    2. Muscle weakness (generalized)        Chief Complaints:   Brain Injury    Precautions:       TODAY'S VISIT    Time In Session:  Start Time: 1805  Stop Time: 1900  Time Calculation (min): 55 min   History/Vitals/Pain/Encounter Info - 23 180        Injury History/Precautions/Daily Required Info    Document Type daily treatment     Chief Complaint/Reason for Visit  Brain Injury     Onset of Illness/Injury or Date of Surgery 23     Referring Physician Dr. Haywood     History of present illness/functional impairment The patient is a 78-year-old  female with a history of AAA, heart block s/p cardiac pacemaker, glaucoma, hyperlipidemia, coronary artery disease, NSTEMI, who presented to [facility] on  after she has not been seen by her friends for a few days.  Patient lives alone and when she was found by EMS she was confused and combative.  In the emergency department she was aphasic with right-sided weakness and hypertensive to the 190 systolic.  CT scan of the head revealed an acute infarct in left MCA with mild bleeding.  Echo showed an EF of 43% and a bubble study was negative.  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  Later this was transitioned to apixaban.  She was continued on statin for secondary prevention.  She required virtual shivani while in the hospital but this was not effective.  She was evaluated by speech therapy and cleared for a soft and bite-size diet with moderately thick liquids.'     Patient/Family/Caregiver Comments/Observations Patient arrives with  caregiver Thelma, she reports plan to have assistance for 2 days per week next week     Patient reported fall since last visit No        Pain Assessment    Currently in pain No/Denies                Daily Treatment Assessment and Plan - 09/07/23 1917        Daily Treatment Assessment and Plan    Progress toward goals Progressing     Daily Outcome Summary Patient arrives to OP OT session with friend Thelma. Deloris Hendricks OTR/L participated in today's session with this writer. Patient demonstrates continued stiffiness and swelling in right hand limited AROM. She demonstrates triggering at SF with forceful gripping. Patient demonstrates adequate gross motor coordination to grasp rolling ball and reach to tap target on right side; she was challenged by return throw to writer but improved with practice, demonstrates improved motor planning with repetition. Patient unable to grasp light stylus pen and use functionally with BITS so she tapped visual targets with right hand fingers; she performed activity with fingers in flexion as she was unable to extend fingers to use functionally. Patient performed PNF movements with right hand only and both hands together while in standing. She was challenged by sustaining grasp on objects using right hand but improved with practice. Patient is progressing towards her goals at this time.     Plan and Recommendations Incorporate rehabilitative and compensatory strategies for RUE sensory and motor recovery                     OBJECTIVE DATA TAKEN TODAY    None Taken    Today's Treatment:    OT NEURO FLOW SHEET     EXERCISES CURRENT SESSION TIME   Initial evaluation 8/16/23    NEURO RE-ED  CPT 15603 TOTAL TIME FOR SESSION   45 Minutes   AAROM/AROM and PROM Fist open/close in elevation for improved ROM at right hand and edema management     Spasticity management      Strengthening       Strength Patient performed PNF movements in standing with light ball and resistance ball; she required  mod verbal and physical cueing     Gross Motor Control  BITS program for functional, goal-directed reach and tap with use of RUE x 3 rounds    Grasp and reach activity with soft balls  - Exercises graded up with bounce and toss to therapist      Fine Motor Control      Sensory re-education Provided deep pressure and massage at right hand for sensory re-educataion    Sitting Jennifer/Balance       Standing Jennifer/Balance       WT Bearing       Graded motor imagery       THERE ACT  CPT 02893 TOTAL TIME FOR SESSION  10 Minutes   Pain, Vitals, Meds, Etc. Vitals taken, pain assessed and monitored     Transfers       THERE EX  CPT 03837 TOTAL TIME FOR SESSION 0-7 Minutes   PROM       Activity Tolerance       SELF-CARE  CPT 66656 TOTAL TIME FOR SESSION  Minutes   Symptom Management       Pt Education/Safety          Transfer Training       ADL Training      IADL Training      MODALITIES  CPT 23009 TOTAL TIME FOR SESSION Not performed   Ice/Heat       Attended E-Stim       SPLINTING  CPT 35613 TOTAL TIME FOR SESSION Not performed   Fabrication/Check Out       Fit       Training

## 2023-09-12 ENCOUNTER — TELEPHONE (OUTPATIENT)
Dept: SCHEDULING | Facility: CLINIC | Age: 78
End: 2023-09-12
Payer: MEDICARE

## 2023-09-12 NOTE — TELEPHONE ENCOUNTER
Pts JONE Renee mistakenly cancelled appts for Dr Salter and Dr Carolina 09/15 and needs to r/s both appts together if possible.    Brigido can be reached at [phone]

## 2023-09-13 ASSESSMENT — ENCOUNTER SYMPTOMS
PSYCHIATRIC NEGATIVE: 1
CONSTITUTIONAL NEGATIVE: 1
ALLERGIC/IMMUNOLOGIC NEGATIVE: 1
GASTROINTESTINAL NEGATIVE: 1
HEMATOLOGIC/LYMPHATIC NEGATIVE: 1
EYES NEGATIVE: 1
CARDIOVASCULAR NEGATIVE: 1
ENDOCRINE NEGATIVE: 1
RESPIRATORY NEGATIVE: 1

## 2023-09-13 NOTE — PROGRESS NOTES
Electrophysiology       Reason for visit: Follow-up   HPI   Jennifer Sams is a 78 y.o. female who comes to the office today for follow-up of her device and related arrhythmia issues.  She is status post dual-chamber pacemaker secondary to 2-1 AV block.  Her past medical history is significant for CAD requiring 8 stents including stents to her LAD, RCA, and circumflex and mild ischemic cardiomyopathy.    Since her last visit she presented to [facility] on June 26 after she was found confused and combative.  In the emergency department she was aphasic with right-sided weakness and hypertensive to the 190 systolic.  CT scan of the head revealed an acute infarct in left MCA with mild bleeding.  Echo showed an EF of 43% and localized apical aneurysm containing thrombus. A bubble study was negative. Etiology of her stroke was thought to be due to the localized apical aneurysm containing thrombus and she was started on anticoagulation with heparin and was later transitioned to Eliquis. Her telemetry during the admission shows atrial sensing with ventricular pacing. No atrial fibrillation was seen on monitor or her pacemaker.    Since that time has been doing well from cardiac standpoint, but remains very frustrated with her neurologic symptoms.  She continues to have significant aphasia, but according to her friend this is markedly improved since her stroke.  Her friend also notes marked improvement in her right sided weakness.        Past Medical History:   Diagnosis Date    AAA (abdominal aortic aneurysm) (CMS/[facility])     Arthritis     Elevated blood pressure reading without diagnosis of hypertension 04/19/2019    Epistaxis 01/06/2020    GERD (gastroesophageal reflux disease) 04/19/2019    Glaucoma 04/19/2019    Heart murmur     Hiatal hernia     History of hip replacement, total, right 04/19/2019    Right hip 9/ 2016 and revision 2017     History of rheumatic fever as a child 04/19/2019     Hypercholesterolemia 04/19/2019    Ischemic cardiomyopathy 05/09/2019    Kidney cysts     MI (myocardial infarction) (CMS/[facility])     Osteoarthritis of multiple joints 04/19/2019    Osteoporosis     Pacemaker 12/09/2019    Raynaud's disease 04/19/2019    RSD (reflex sympathetic dystrophy) 04/19/2019    Of left foot    SOB (shortness of breath) 12/10/2019    Status post insertion of drug eluting coronary artery stent 05/09/2019    Stroke (CMS/[facility])      Past Surgical History:   Procedure Laterality Date    CHOLECYSTECTOMY OPEN      CORONARY ANGIOPLASTY WITH STENT PLACEMENT  04/29/2019    of LAD    CORONARY ANGIOPLASTY WITH STENT PLACEMENT  04/30/2019    of RCA    DILATION AND CURETTAGE OF UTERUS      EYE SURGERY      RIGHT CATARACT    FOOT SURGERY Left     JOINT REPLACEMENT Right 09/2016    total hip    REVISION TOTAL HIP ARTHROPLASTY Right 2017    TONSILLECTOMY       Allergies:  Ace inhibitors, Atorvastatin, Brilinta [ticagrelor], Codeine, Dilaudid [hydromorphone], Morphine sulfate, Onion, and Oxycodone    Current Outpatient Medications on File Prior to Visit   Medication Sig Dispense Refill    apixaban (ELIQUIS) 5 mg tablet Take 1 tablet (5 mg total) by mouth 2 (two) times a day Indications: treatment to prevent blood clots in chronic atrial fibrillation. 180 tablet 3    cycloSPORINE (RESTASIS) 0.05 % ophthalmic emulsion Administer 1 drop into both eyes every 12 (twelve) hours. 3 each 0    irbesartan (AVAPRO) 75 mg tablet Take 1 tablet (75 mg total) by mouth daily. 90 tablet 3    pantoprazole (PROTONIX) 40 mg EC tablet Take 1 tablet (40 mg total) by mouth daily. 90 tablet 3    rosuvastatin (CRESTOR) 20 mg tablet Take 1 tablet (20 mg total) by mouth nightly. 90 tablet 3    trazodone (DESYREL) 50 mg tablet Take 0.5 tablets (25 mg total) by mouth nightly. 15 tablet 0     No current facility-administered medications on file prior to visit.     Social History     Socioeconomic History    Marital  status: Single     Spouse name: None    Number of children: None    Years of education: None    Highest education level: None   Tobacco Use    Smoking status: Never    Smokeless tobacco: Never   Vaping Use    Vaping Use: Never used   Substance and Sexual Activity    Alcohol use: Not Currently    Drug use: No    Sexual activity: Not Currently     Social Determinants of Health     Financial Resource Strain: Low Risk  (7/3/2023)    Overall Financial Resource Strain (CARDIA)     Difficulty of Paying Living Expenses: Not hard at all   Food Insecurity: No Food Insecurity (9/14/2023)    Hunger Vital Sign     Worried About Running Out of Food in the Last Year: Never true     Ran Out of Food in the Last Year: Never true   Transportation Needs: No Transportation Needs (7/3/2023)    PRAPARE - Transportation     Lack of Transportation (Medical): No     Lack of Transportation (Non-Medical): No   Housing Stability: Low Risk  (7/3/2023)    Housing Stability Vital Sign     Unable to Pay for Housing in the Last Year: No     Number of Places Lived in the Last Year: 1     Unstable Housing in the Last Year: No     Family History   Problem Relation Age of Onset    Congenital heart disease Biological Mother         noted at autopsy    Pulmonary fibrosis Biological Father     Heart attack Paternal Grandfather      Review of Systems   Constitutional: Negative.    HENT: Negative.    Eyes: Negative.    Respiratory: Negative.    Cardiovascular: Negative.    Gastrointestinal: Negative.    Endocrine: Negative.    Genitourinary: Negative.    Skin: Negative.    Allergic/Immunologic: Negative.    Neurological: Positive for speech difficulty and weakness.   Hematological: Negative.    Psychiatric/Behavioral: Negative.      Vitals:    09/15/23 1318   BP: (!) 150/92   Pulse: 62   Resp: 16     Wt Readings from Last 3 Encounters:   09/15/23 55.3 kg (122 lb)   09/15/23 55.3 kg (122 lb)   08/28/23 54.9 kg (121 lb)     Physical  Exam  Constitutional:       Appearance: Normal appearance. She is normal weight.   HENT:      Head: Normocephalic and atraumatic.   Cardiovascular:      Rate and Rhythm: Normal rate and regular rhythm.      Pulses: Normal pulses.      Heart sounds: Normal heart sounds.   Pulmonary:      Effort: Pulmonary effort is normal.      Breath sounds: Normal breath sounds.   Skin:     General: Skin is warm and dry.   Neurological:      Mental Status: She is alert and oriented to person, place, and time.      Motor: Weakness present.      Comments: She has right-sided extremity weakness, and dysarthria/aphasia.        Lab Results   Component Value Date    WBC 5.12 07/10/2023    HGB 15.0 07/10/2023    HCT 45.0 07/10/2023     07/10/2023    CHOL 178 06/26/2023    TRIG 93 06/26/2023    HDL 71 06/26/2023    LDLCALC 88 06/26/2023    ALT 9 07/02/2023    AST 22 07/02/2023     07/10/2023    K 4.2 07/10/2023    CREATININE 0.8 07/10/2023    BUN 11 07/10/2023    TSH 2.94 04/27/2022    INR 1.1 06/28/2023       Cardiac Imaging    TRANSTHORACIC ECHO (TTE) COMPLETE 02/09/2022  · Left Ventricle: Normal ventricle size. Mild concentric left ventricular hypertrophy. Mild-to-moderately decreased systolic function. Estimated EF 40- 45%. LV SVI low at 28 mL/m² per beat. Basal and mid inferolateral akinesis, mid and apical anteroseptal akinesisGrade I diastolic dysfunction. Diastolic inflow pattern consistent with impaired relaxation. Normal left atrial pressure.  · Tricuspid aortic valve. Sclerotic aortic valve leaflets.  · Aorta: Sinuses of Valsalva normal-sized.  · Normal leaflet structure of the mitral valve.  · Normal-sized LA. Doppler flow of pulmonary veins not obtained.  · Normal-sized RV. Mildly reduced RV systolic function. Pacemaker/ICD lead present in the RV. Normal RV wall thickness. Mid free wall akinetic.  · Normal-sized RA. Pacemaker wire present in the RA.  · Tricuspid Valve: Normal structure. Trace regurgitation.  Estimated RVSP = 27 mmHg.  · Pulmonic valve not well visualized.  · IVC collapses <50% during inspiration.  · Evidence of a prominent pericardial fat pad.  Limited study.  Tissue velocities not obtained.  Apical 2 chamber view not available.  Patient declined Definity.    Echo 6/27/2023   Left ventricle with normal dimensions and regional contraction abnormalities consistent with CAD in the LAD distribution: Localized apical infarction with aneurysm formation.  Paradoxical septal movement is consistent with ventricular pacing.  There is moderate left ventricular systolic and mild diastolic dysfunction.  LVEF 43%   Left ventricular apical thrombus   Left atrial pressure 14 mmHg   There is a visual suggestion of mild aortic stenosis. (Doppler assessment of the aortic valve is suboptimal)   Mild mitral regurgitation   Normal right ventricle dimensions and systolic function: TAPSE 1.84 cm   Mild tricuspid regurgitation   PASP 34 mmHg   Normal left atrial volume index   No pericardial effusion or vegetations   AS-V pacing   Technically difficult study: No parasternal imaging obtained   This study was performed with Definity contrast to optimize evaluation of regional and global left ventricular function   No significant change from 3/28/2023 except for current demonstration of left ventricular apical thrombus          ECG sinus rhythm with ventricular pacing.     Cardiac pacemaker  Medtronic Graton XT DR MRI [id number] implanted on 11/18/2019.  Battery longevity estimated at 8.6 years until RRT.  Mode is DDD .  There is atrial pacing 44% of the time and V pacing 99.7% of the time there were no events on interrogation.  Atrial lead has a measured P wave of 3.1 mV, pacing threshold of 0.5 V at 0.4 ms, and a lead impedance of 627 ohms.  The RV lead is measured R wave of 9.6 mV, pacing threshold of 1.0 V 0.4 ms, and a lead impedance of 437 ohms.    Her device is MRI compatible.  If necessary, she can undergo  an MRI.  There were no changes made to the permanent programming of her device.    Ischemic cardiomyopathy  Her most recent echocardiogram demonstrated an EF of 40 to 45%; with apical aneurysm small thrombus.  She is euvolemic on exam today. She follows closely with Dr. Salter.     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    Coronary artery disease involving native coronary artery of native heart with angina pectoris (CMS/HCC)  She has a history of coronary artery disease with non-STEMI in April 2019 and is status post drug-eluting stent x2 to the LAD, drug-eluting stent x4 to the RCA, drug-eluting stent x1 to the left circumflex.  She is maintained on aspirin and statin therapy.  Plavix had to be stopped in the setting of severe nosebleeds.  She has no symptoms of angina.     Acute ischemic left MCA stroke (CMS/HCC)  She presented to [place] ED on 6/2 where she was found to be confused and aphasic with right sided weakness. CT revealed acute infarct in left MCA. Echo showed an EF of 43% and localized apical aneurysm containing thrombus. She was started on Eliquis 5mg BID.     With discussion of her friend, it appears her symptoms have improved remarkably over the past couple months.  Though she remains frustrated, she was encouraged to continue with her rehab and therapy.         Jermaine Carolina MD  09/15/2023

## 2023-09-14 ENCOUNTER — HOSPITAL ENCOUNTER (OUTPATIENT)
Dept: OCCUPATIONAL THERAPY | Age: 78
Setting detail: THERAPIES SERIES
Discharge: HOME | End: 2023-09-14
Attending: PHYSICAL MEDICINE & REHABILITATION
Payer: MEDICARE

## 2023-09-14 ENCOUNTER — HOSPITAL ENCOUNTER (OUTPATIENT)
Dept: SPEECH THERAPY | Age: 78
Setting detail: THERAPIES SERIES
Discharge: HOME | End: 2023-09-14
Attending: PHYSICAL MEDICINE & REHABILITATION
Payer: MEDICARE

## 2023-09-14 DIAGNOSIS — R27.9 LACK OF COORDINATION: ICD-10-CM

## 2023-09-14 DIAGNOSIS — I63.512 ACUTE ISCHEMIC LEFT MCA STROKE (CMS/HCC): Primary | ICD-10-CM

## 2023-09-14 DIAGNOSIS — I63.9 CEREBROVASCULAR ACCIDENT (CVA), UNSPECIFIED MECHANISM (CMS/HCC): Primary | ICD-10-CM

## 2023-09-14 DIAGNOSIS — G81.90: ICD-10-CM

## 2023-09-14 DIAGNOSIS — M62.81 MUSCLE WEAKNESS (GENERALIZED): ICD-10-CM

## 2023-09-14 PROCEDURE — 97530 THERAPEUTIC ACTIVITIES: CPT | Mod: GO,59

## 2023-09-14 PROCEDURE — 92507 TX SP LANG VOICE COMM INDIV: CPT | Mod: GN,KX

## 2023-09-14 PROCEDURE — 97112 NEUROMUSCULAR REEDUCATION: CPT | Mod: GO,59

## 2023-09-14 ASSESSMENT — 9 HOLE PEG TEST: TESTTIME_SECONDS: 24.55

## 2023-09-14 NOTE — OP OT TREATMENT LOG
OT NEURO FLOW SHEET    EXERCISES  Initial Evaluation  Progress Note 1 CURRENT SESSION  8/16/2023 9/14/2023 TIME   NEURO RE-ED  CPT 52623 TOTAL TIME FOR SESSION 8-22 Minutes   AAROM/AROM     Strengthening      Strength     Gross Motor Control Dynavision trials   -LUE: 36/37, 1.52 sec   -RUE: 28/29, 1.88 sec (used fisted hand)    Fine Motor Control     Sitting Jennifer/Balance     Standing Jennifer/Balance      Retraining Required total assist to locate letters on keyboard to type first name.     Engaged in visual process training using field of 4-5 letters to construct word given visual key with minimal assistance x 3.     Able to sort colored blocks in accordance with written color x 4 with 100% accuracy.     THERE ACT  CPT 24475 TOTAL TIME FOR SESSION 38-52 Minutes   Pain, Vitals, Meds, Etc. Pain monitored    Assessment Patient with 100% participation in progress note testing and completed the following objective measures: Dynavision, /pinch strength, nine hole peg, box and blocks. Please refer to attached note for objective and subjective findings.     HEP Right hand stretching    Functional Mobility     Transfers     THERE EX  CPT 03795 TOTAL TIME FOR SESSION 0-7 Minutes   PROM Educated on right hand stretching due to flexor tightness.     Activity Tolerance     SELF-CARE  CPT 54968 TOTAL TIME FOR SESSION Not performed   Pt Education/Safety     ADL Training     IADL Training     MODALITIES  CPT 63962 TOTAL TIME FOR SESSION Not performed   Ice/Heat     ATTENDED E-STIM  CPT 54705 TOTAL TIME FOR SESSION Not performed   Attended E-Stim     SPLINTING  CPT 09302 TOTAL TIME FOR SESSION Not performed   Fabrication/Check Out     Fit     Training     GROUP  CPT 89472 TOTAL TIME FOR SESSION Not performed             Normative Range for Female 75+ Years of Age       Right Hand   Left Hand      Initial Evaluation Progress Note 1 Norm Initial Evaluation Progress Note 1 Norm    Strength 0 lbs 0 lbs 42.6 lbs 30 lbs 30 lbs  37.6 lbs   Lateral Pinch N/T 2 lbs 12.6 lbs N/T 14 lbs 11.4 lbs   Three Jaw Yonatan N/T 2 lbs (assist for position) 12 lbs N/T 11 lbs 11.5 lbs   Tip Pinch N/T N/T 9.6 lbs N/T 9 lbs 9.3 lbs   Box and Block 4 blocks 22 blocks 65 blocks 50 blocks 56 blocks 63.6 blocks   Nine Hole Peg (71+ age) Unable to complete Unable to complete 22.49 seconds N/T 24.55 seconds 24.11 seconds

## 2023-09-14 NOTE — OP SLP TREATMENT LOG
MOTOR/PRODUCTION SLP FLOW SHEET    SKILL AREA CURRENT SESSION   SPEECH PRODUCTION (MOTOR)  CPT 09470    Pt will produce bilabials /p, b, m, w/, tip alveolars /t, d, n/ and tense vowels /a, e, I, o,u/ in isolation provided mod A, 80% of trials.  2023  I can't, wait, are you kidding me?, please, bye  /b/: isolation- with tactile cues  /m/:isolation- with tactile cues  /w/: isolation- with tactile cues.   /p/: isolation following auditory bombardment.   /n/: isolation: with placement cues.  Vowels: 100% with decreased rate and DM. Rapid 80%, 60%    2023  p- unable  b max A,   m- mod A, frequently substitutes /n/   w- Min A,  /a, e, I, o,u/- min to mod A   Pt will produce VC and CV syllables provided the above phonemes, Mod A, 80% of trials 2023  /w/ + vowels    2023  /w/ + vowels: Mod to max A   Pt will produce single and bisyllablic words consisting of CVCV, VCV and CVC, Mod A, 80% of presentations.    Pt will produce simple automatics provided mod A:  1-10  A-G  Yolis and identifiable syllable combinations for MALLY 2023  Approximated for 70% of 1-10   Pt will produce small set of functional words/phrases in unison or in imitation with SLP, then produce 3  successive attempts with 80% accuracy  2023  Produced 8/14 functional words producing all phonemes.     2023  Established list of functional words, names. See below.  Pt stimulable for 16.  Approximations for remainder.   Noted with difficulty with velars, blends in particular.     Auditory Comprehension and reading to be further assessed for determination of goals to support speech production    GOAL: 2023  Given direct model, repetition and NV reinforcement, Pt will follow 1-step commands for motor speech production up to 1-2 syllable words, 80% of presentations.  2023  Auditory comp,severe impairment:  Aphasia Severity Ratin/5-   All Communication is through fragmentary expression; great need for inference,  "questioning, and guessing by the listener.  The range of information that can be exchanged is limited, and the listener carries the burden of communication.    Provided pictures/objects, and/or body parts, pt will demonstrate  understanding of \"point to___\" and \" ___\" 85% of trials.  9/6/2023  Requires: direct model, repetition NV feedback to place tongue depressor in position for labial proprioception.    Education/Strategy Training    Other 8/25/2023  POA reports suspect decline in cognition recently.  Encouraged to report to MD and encourage improving sleep and nutrition routines.      Functional words and names: 8.25.2023  Dick Milner  Water    I'm thirsty  I'm fine  Arm hurts  Legs hurts  Call me  I'm tired  Feed Loan.      BDAE  Eval: 8/18/2023      Aphasia Severity Rating Scale 1/5    CONVERSATIONAL and EXPOSITORY SPEECH       Simple Social Responses Raw Score:   Percentiles:    Complexity Index  Raw Score:    Ratio:   Percentiles:     Narrative Discourse  Short form Raw Score:   Ratio:   Percentiles:     AUDITORY COMPREHEN     Basic Word Discrimination    Commands  Standard Form  8/25/2023     Complex Ideational Material  Short Form  8/25/2023             Raw Score: 29/37  Percentiles:20%     Raw Score: 3/15  Percentiles: 0-10%       Raw Score: 0/4  Percentiles: 0%                            ARTICULATION  Agility              ORAL EXPRESSION  Recitation, Yolis, Rhythm              Automatized Sequences  Standard Form   Recitation: 0  Percentiles: 30%tile  Yolis: 1  Percentiles: 30%tile  Rhythm: 1  Percentiles: 60%tile     Raw Score: 0/8  Percentiles: 0%tile       REPETITION  Words  Standard Form     Repetition of Nonsense words     Sentences  Standard Form    Raw Score: 0  Percentiles:0 %tile     Raw Score:  Percentiles:      Raw Score:  Percentiles:                          NAMING  Responsive Naming  Standard Form   "   [place] Naming Test  Standard Form    Raw Score:   Percentiles:      Raw Score:   Stim cue:   PC:   MC:   Percentiles:   Mean:                 READING  Match case and scripts    Number matching    Picture-Word Matching    Oral Word Reading    Oral Sentence Reading    Oral Sentence Comp    Sentence/Paragraph    Comprehension   Raw Score: 7  Percentiles: 40%tile  Raw Score:   Percentiles:   Raw Score: 10  Percentiles: 100%tile  Raw Score:   Percentiles:   Raw Score:   Percentiles:   Raw Score:   Percentiles:   Raw Score:   Percentiles:   Raw Score:   Percentiles:      WRITING  Form    Letter Choice    Motor Facility    Primer Words    Regular Phonics    Common Irregular Words    Written Picture Naming    Narrative Writing    Raw Score:   Percentiles:   Raw Score:   Percentiles:   Raw Score:   Percentiles:   Raw Score:   Percentiles:   Raw Score:   Percentiles:   Raw Score:   Percentiles:   Raw Score:   Percentiles:   Raw Score:   Percentiles:           IE: 2023   Rating Scale Profile of Speech Characteristics 1-7 scale   Articulatory Agility-phoneme and syllable level 1/7   Phrase Length: longest word runs 2/7   Grammatical Form: Variety and use of morphemes 2/7   Melodic Line ([place]) 1/7   Paraphasia in Running Speech 1/7    Word Finding Relative to Fluency 1/7   Sentence Repetition 1/7   Auditory Comprehension 1/7      Aphasia Severity Ratin/5-   All Communication is through fragmentary expression; great need for inference, questioning, and guessing by the listener.  The range of information that can be exchanged is limited, and the listener carries the burden of communication.

## 2023-09-14 NOTE — PROGRESS NOTES
Occupational Therapy Progress Note    OT PROGRESS NOTE FOR OUTPATIENT THERAPY    Patient: Jennifer Sams MRN: [id number]  : 1945 78 y.o.  Referring Physician: Liv Haywood, Debra  Date of Visit: 2023      Certification Dates:   23 through 23    Recommended Frequency & Duration:  2 times/week for up to 3 months        Diagnosis:   1. Acute ischemic left MCA stroke (CMS/HCC)    2. Muscle weakness (generalized)    3. Lack of coordination    4. Hemiplegia, dominant side (CMS/HCC)        Chief Complaints:  Chief Complaint   Patient presents with    Dec Coordination    Dec Strength    Visual Deficits    Decreased recreational/play activity     Decreased Community Integration    Speech Problem       Precautions:       TODAY'S VISIT:    Time In Session:  Start Time:   Stop Time:   Time Calculation (min): 60 min   General Information - 23        Session Details    Document Type progress note     Mode of Treatment individual therapy        General Information    Onset of Illness/Injury or Date of Surgery 23     Referring Physician Dr. Haywood     History of present illness/functional impairment The patient is a 78-year-old  female with a history of AAA, heart block s/p cardiac pacemaker, glaucoma, hyperlipidemia, coronary artery disease, NSTEMI, who presented to [facility] on  after she has not been seen by her friends for a few days.  Patient lives alone and when she was found by EMS she was confused and combative.  In the emergency department she was aphasic with right-sided weakness and hypertensive to the 190 systolic.  CT scan of the head revealed an acute infarct in left MCA with mild bleeding.  Echo showed an EF of 43% and a bubble study was negative.  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  Later this was transitioned to apixaban.  She was  "continued on statin for secondary prevention.  She required virtual shivani while in the hospital but this was not effective.  She was evaluated by speech therapy and cleared for a soft and bite-size diet with moderately thick liquids.'     Limitations/Impairments safety/cognitive     Patient/Family/Caregiver Comments/Observations Patient arrives with Thelma BECERRIL. Thelma notes that patient walked a lot on Monday and has been complaining about RLE since then.         Services    Do You Speak a Language Other Than English at Home? no                Daily Falls Screen - 09/14/23 1807        Daily Falls Assessment    Patient reported fall since last visit No                Pain/Vitals - 09/14/23 1807        Pain Assessment    Currently in pain No/Denies     Pain Side/Orientation right     Pain: Body location Leg   \"heavy\" sensation               OT - 09/14/23 1807        Occupational Therapy Encounter Type Details    Occupational Therapy Specialty Traditional Neuro Program OT        OT Frequency and Duration    Frequency of treatment 2 times/week     OT Duration 3 months     OT Cert From 08/16/23     OT Cert To 11/14/23     Date OT POC was sent to provider 08/16/23     Signed OT Plan of Care received?  Yes                Assessment - 09/14/23 1807        Assessment    Plan of Care reviewed and patient/family in agreement Yes     Comments Patient and POA in agreement with plan of care.     System Pathology/Pathophysiology Noted neuromuscular     Functional Limitations in Following Categories self-care;home management;community/leisure     Problem List: Occupational Therapy motor control impaired;sensation decreased;decreased flexibility;impaired swallowing;sensory feedback impaired;impaired cognition;inability to direct their own care;pain;ROM decreased;strength decreased     Rehab Potential/Prognosis: Occupational Therapy good, to achieve stated therapy goals     Clinical Assessment Patient participated in initial " Occupational Therapy evaluation on 8/16/2023 and has participated in 4 Occupational Therapy visits, including this session. She arrives with her POAThelma, who has been assisting with communication. Patient exhibits expressive and receptive aphasia, visual processing deficits, reading/writing with significant impairment, RUE hemiparesis, right SM trigger finger, sensory deficits, right hand with hypersensitivity, decreased functional use of RUE during ADLs and IADLs. She has made great gains toward OT treatment goals in therapy. She improved RUE box and blocks score from 4 to 22 blocks and is now able to use RUE to complete dynavision reaction speed trials indicating improved gross motor coordination. She is very near age-level norms with LUE nine hole peg assessment. She is now able to grasp small mosaic tiles and tennis ball sized objects and place in target area using RUE. Her visual processing of one word phrases has also improved. She is able to sort colored blocks given written words in field of 4 and able to organize written letters to spell her name with 100% accuracy. She was unable to following short 4 word phrases to perform motor actions. She also continues to be limited by RUE motor and sensory deficits and language impairments. Recommend continuation of skilled Occupational Therapy services.     Plan and Recommendations Incorporate rehabilitative and compensatory strategies for RUE sensory and motor recovery                 OBJECTIVE MEASUREMENTS/DATA:    Living Environment    Living Environment - 09/14/23 1807        Living Environment    People in Home alone   with a cat, daniel    Living Environment Comment 2SH, B/B 2nd flr-tub shower with shower chair and grabbars in shower, FF stairs to 2nd flr w/L ascending rail, no [place].  no DARÍO.               PLOF     Reading and Writing     Reading and Writing - 09/14/23 1807        Reading and Writing Assessment    Reading (comments) Able to place correct  "colored blocks on written word with 100% accuracy. Unable to read and perform 3-4 word phrases, such as, \"clap your hands\". Recognizes word \"CAT\" which is her favorite animal.               Skin and Sensation    Skin and Sensation - 09/14/23 1807        OTHER    Skin Assessment/Comments RUE impaired proprioception and light touch sensation. Somewhat hypersensitive to deep pressure through right hand               MMT     Manual Muscle Tests - 09/14/23 1807        RIGHT: Upper Extremity Manual Muscle Test Assessment    Shoulder Flexion gross movement (4+/5) good plus     Shoulder Extension gross movement (4+/5) good plus     Shoulder Adduction gross movement (4+/5) good plus     Shoulder Abduction gross movement (4+/5) good plus     Shoulder Internal Rotation gross movement (3+/5) fair plus     Shoulder External Rotation gross movement (4/5) good     Elbow Flex gross movement (4+/5) good plus     Elbow Extension gross movement (4+/5) good plus     Forearm Supination gross movement (4/5) good     Forearm Pronation gross movement (4/5) good     Wrist Flexion gross movement (3+/5) fair plus     Wrist Extension gross movement (3+/5) fair plus     Wrist Ulnar Deviation gross movement (2/5) poor     Wrist Radial Deviation gross movement (2/5) poor        LEFT: Upper Extremity Manual Muscle Test Assessment    Shoulder Flexion gross movement (4+/5) good plus     Shoulder Extension gross movement (4+/5) good plus     Shoulder Adduction gross movement (4+/5) good plus     Shoulder Abduction gross movement (4+/5) good plus     Shoulder Internal Rotation gross movement (4+/5) good plus     Shoulder External Rotation gross movement (4+/5) good plus     Elbow Flexion gross movement (4+/5) good plus     Elbow Extension gross movement (4+/5) good plus     Forearm Supination gross movement (4+/5) good plus     Forearm Pronation gross movement (4+/5) good plus     Wrist Flexion gross movement (4/5) good     Wrist Extension gross " movement (4/5) good     Wrist Ulnar Deviation gross movement (4/5) good     Wrist Radial Deviation gross movement (4/5) good               BADL/IADL    BADL/IADL - 09/14/23 1807        BADL Interventions Assessment    Upper Body Dressing Modified independence     Lower Body Dressing Modified independence     Bathing Minimum assist (75% patient effort)     Bathing comments Some difficulties with washing hair and bathing     Toileting Independent     Grooming comments Difficulty with brushing teeth     Eating Minimum assist (75% patient effort)     Eating comments diet is soft food only; requires assist with cutting into pieces        IADL/Home Interventions Assessment    Driving and community mobility Dependent (less than 25% patient effort)               Work and School     Work and School Assessment - 09/14/23 1807        Work/School/Leisure Assessment    Job Performance (comments) Retired     Leisure / Social Participation (comments) Previously enjoyed socialized and animal lover               Gross and Fine Motor     Gross and Fine Motor - 09/14/23 1807        Gross Motor Control Assessment    Comment, Gross Motor Coordination RUE able to perform gross grasp of tennis ball sized objects while viewing object due to sensory deficits. Difficulty with sustained grasp. Able to perform lateral pinch but unable to perform three jaw or tip pinch without assistance.        Motor Skills    Motor Skills coordination;neuro-muscular function     Coordination fine motor deficit;gross motor deficit;right;upper extremity;severe impairment;bimanual skills;dysdiadochokinesia;dysmetria;finger to nose;opposition;9 Hole Peg Test of Fine Motor Coordination Results     Motor Control/Coordination Interventions bilateral/bimanual training;fine motor manipulation/dexterity activities;functional task specific training;graded motor imagery/mirror therapy;gross motor coordination activities;motor pattern re-training;neuro-muscular  re-education;occupation/activity based treatment;repetitive task training;therapeutic exercise/ROM        Hand  Strength Testing    Left Hand, Setting 2 30 lbs     Right Hand, Setting 2 0 lbs     Left Hand: Tip (Pincer) Pinch Strength 9 lbs     Left Hand: Lateral (Key) Pinch Strength 14 lbs     Left Hand: Three Point (Yonatan) Pinch Strength 11 lbs     Right Hand: Lateral (Key) Pinch Strength 2 lbs     Right Hand: Three Point (Yonatan) Pinch Strength 2 lbs with assistance for hand positioning               Outcome Measures    Outcome Measures - 09/14/23 1807        Objective Outcome Measures    RIGHT hand: Box and Blocks Assessment 22     LEFT hand: Box and Blocks Assessment 56     9 Hole Peg Test - RIGHT HAND TIME --   Unable to complete after 45 seconds    9 Hole Peg Test - LEFT HAND TIME 24.55     9 Hole Peg Test - COMMENTS Able to use gross grasp to  5 small mosaic glass beads and place in target area in 38.85 seconds               Vision     Vision - 09/14/23 1807        Eye Teaming    Convergence Intact     Smooth Pursuits Intact        Visual Reaction Timing    Dynavision -LUE: 36/37, 1.52 sec  -RUE: 28/29, 1.88 sec (used fisted hand)     Dynavision Score Overall Impaired                 ROM and MMT        9/14/2023    18:07   OT UE MMT   Right Shoulder Flexion (4+/5) good plus   Left Shoulder Flexion (4+/5) good plus   Right Shoulder Extension (4+/5) good plus   Left Shoulder Extension (4+/5) good plus   Right Shoulder ADD (4+/5) good plus   Left Shoulder ADD (4+/5) good plus   Right Shoulder ABD (4+/5) good plus   Left Shoulder ABD (4+/5) good plus   Right Shoulder IR (3+/5) fair plus   Left Shoulder IR (4+/5) good plus   Right Shoulder ER (4/5) good   Left Shoulder ER (4+/5) good plus   Right Elbow Flexion (4+/5) good plus   Left Elbow Flexion (4+/5) good plus   Right Elbow Extension (4+/5) good plus   Left Elbow Extension (4+/5) good plus   Right Forearm Supination (4/5) good   Left Forearm  Supination (4+/5) good plus   Right Forearm Pronation (4/5) good   Left Forearm Pronation (4+/5) good plus   Right Wrist Flexion (3+/5) fair plus   Left Wrist Flexion (4/5) good   Right Wrist Extension (3+/5) fair plus   Left Wrist Extension (4/5) good   Right Wrist UD (2/5) poor   Left Wrist UD (4/5) good   Right Wrist RD (2/5) poor   Left Wrist RD (4/5) good     Outcome Measures        8/16/2023    14:03 9/14/2023    18:07   OT OBJECTIVE Outcome Measures   9 Hole Peg Test - Right Hand  --        Unable to complete after 45 seconds   9 Hole Peg Test - Left Hand  24.55   9 Hole Peg Test - Comments  Able to use gross grasp to  5 small mosaic glass beads and place in target area in 38.85 seconds   Right Hand Box and Blocks 4 22   Left Hand Box and Blocks 50 56   Dynavision - Impression  Impaired       Today's Treatment::    Education provided:  Yes: See treatment log for details of education provided      OT NEURO FLOW SHEET    EXERCISES  Initial Evaluation  Progress Note 1 CURRENT SESSION  8/16/2023 9/14/2023 TIME   NEURO RE-ED  CPT 86474 TOTAL TIME FOR SESSION 8-22 Minutes   AAROM/AROM     Strengthening      Strength     Gross Motor Control Dynavision trials   -LUE: 36/37, 1.52 sec   -RUE: 28/29, 1.88 sec (used fisted hand)    Fine Motor Control     Sitting Jennifer/Balance     Standing Jennifer/Balance      Retraining Required total assist to locate letters on keyboard to type first name.     Engaged in visual process training using field of 4-5 letters to construct word given visual key with minimal assistance x 3.     Able to sort colored blocks in accordance with written color x 4 with 100% accuracy.     THERE ACT  CPT 62418 TOTAL TIME FOR SESSION 38-52 Minutes   Pain, Vitals, Meds, Etc. Pain monitored    Assessment Patient with 100% participation in progress note testing and completed the following objective measures: Dynavision, /pinch strength, nine hole peg, box and blocks. Please refer to attached  note for objective and subjective findings.     HEP Right hand stretching    Functional Mobility     Transfers     THERE EX  CPT 07048 TOTAL TIME FOR SESSION 0-7 Minutes   PROM Educated on right hand stretching due to flexor tightness.     Activity Tolerance     SELF-CARE  CPT 16511 TOTAL TIME FOR SESSION Not performed   Pt Education/Safety     ADL Training     IADL Training     MODALITIES  CPT 79708 TOTAL TIME FOR SESSION Not performed   Ice/Heat     ATTENDED E-STIM  CPT 09167 TOTAL TIME FOR SESSION Not performed   Attended E-Stim     SPLINTING  CPT 81352 TOTAL TIME FOR SESSION Not performed   Fabrication/Check Out     Fit     Training     GROUP  CPT 17440 TOTAL TIME FOR SESSION Not performed             Normative Range for Female 75+ Years of Age       Right Hand   Left Hand      Initial Evaluation Progress Note 1 Norm Initial Evaluation Progress Note 1 Norm    Strength 0 lbs 0 lbs 42.6 lbs 30 lbs 30 lbs 37.6 lbs   Lateral Pinch N/T 2 lbs 12.6 lbs N/T 14 lbs 11.4 lbs   Three Jaw Yonatan N/T 2 lbs (assist for position) 12 lbs N/T 11 lbs 11.5 lbs   Tip Pinch N/T N/T 9.6 lbs N/T 9 lbs 9.3 lbs   Box and Block 4 blocks 22 blocks 65 blocks 50 blocks 56 blocks 63.6 blocks   Nine Hole Peg (71+ age) Unable to complete Unable to complete 22.49 seconds N/T 24.55 seconds 24.11 seconds            Goals Addressed                 This Visit's Progress     OT Neuro/Deconditioning Goals          Short Term Goals Time Frame Result Comment/Progress   Assess gross motor control via Box and Block Assessment  4 weeks Ongoing 9/14: R: 22; L: 56   Assess fine motor control via 9 hole peg test 4 weeks Ongoing  9/14: R: Unable; L: 24.55   Improved automatic fine motor use at right hand with routine tasks (e.g. eating, writing, brushing teeth) to 25% of baseline  4 weeks Ongoing     Explore adaptive visual strategies to encourage looking to right side 4 weeks Ongoing    Incorporate right hand in at least 2 self-care activities 4 weeks  Ongoing    Carry over HEP at least 3 times per week  4 weeks Ongoing        Long Term Goals Time Frame Result Comment/Progress   Improve gross motor control as evidenced by at least 10 block improved at RUE with Box and Block Assessment for improved ability to perform ADLs and IADLs  12 weeks Ongoing     Patient will complete 9 hole peg test in under 3 minutes 12 weeks Ongoing    Decrease distance from Right MF to DPC to 2 cm for improved functional grasp at right hand 12 weeks Ongoing     Improved automatic fine motor use at right hand with routine tasks (e.g. eating, writing, brushing teeth) to 75% of baseline 12 weeks Ongoing

## 2023-09-15 ENCOUNTER — OFFICE VISIT (OUTPATIENT)
Dept: CARDIOLOGY | Facility: CLINIC | Age: 78
End: 2023-09-15
Payer: MEDICARE

## 2023-09-15 VITALS
HEART RATE: 62 BPM | SYSTOLIC BLOOD PRESSURE: 150 MMHG | DIASTOLIC BLOOD PRESSURE: 92 MMHG | RESPIRATION RATE: 16 BRPM | WEIGHT: 122 LBS | HEIGHT: 68 IN | BODY MASS INDEX: 18.49 KG/M2

## 2023-09-15 VITALS
BODY MASS INDEX: 18.49 KG/M2 | HEIGHT: 68 IN | HEART RATE: 63 BPM | DIASTOLIC BLOOD PRESSURE: 60 MMHG | SYSTOLIC BLOOD PRESSURE: 130 MMHG | OXYGEN SATURATION: 99 % | WEIGHT: 122 LBS

## 2023-09-15 DIAGNOSIS — I25.119 CORONARY ARTERY DISEASE INVOLVING NATIVE CORONARY ARTERY OF NATIVE HEART WITH ANGINA PECTORIS (CMS/HCC): ICD-10-CM

## 2023-09-15 DIAGNOSIS — Z95.0 CARDIAC PACEMAKER: ICD-10-CM

## 2023-09-15 DIAGNOSIS — I63.512 ACUTE ISCHEMIC LEFT MCA STROKE (CMS/HCC): ICD-10-CM

## 2023-09-15 DIAGNOSIS — I50.42 CHRONIC COMBINED SYSTOLIC AND DIASTOLIC CHF (CONGESTIVE HEART FAILURE) (CMS/HCC): ICD-10-CM

## 2023-09-15 DIAGNOSIS — I63.9 CEREBROVASCULAR ACCIDENT (CVA), UNSPECIFIED MECHANISM (CMS/HCC): ICD-10-CM

## 2023-09-15 DIAGNOSIS — I25.5 ISCHEMIC CARDIOMYOPATHY: ICD-10-CM

## 2023-09-15 DIAGNOSIS — E78.5 DYSLIPIDEMIA: ICD-10-CM

## 2023-09-15 DIAGNOSIS — I51.3 LV (LEFT VENTRICULAR) MURAL THROMBUS: Primary | ICD-10-CM

## 2023-09-15 DIAGNOSIS — I25.10 CORONARY ARTERY DISEASE INVOLVING NATIVE CORONARY ARTERY OF NATIVE HEART WITHOUT ANGINA PECTORIS: Primary | ICD-10-CM

## 2023-09-15 DIAGNOSIS — I73.9 PVD (PERIPHERAL VASCULAR DISEASE) (CMS/HCC): ICD-10-CM

## 2023-09-15 DIAGNOSIS — I44.1 MOBITZ TYPE 2 SECOND DEGREE ATRIOVENTRICULAR BLOCK: ICD-10-CM

## 2023-09-15 DIAGNOSIS — I25.10 CORONARY ARTERY DISEASE INVOLVING NATIVE HEART WITHOUT ANGINA PECTORIS, UNSPECIFIED VESSEL OR LESION TYPE: ICD-10-CM

## 2023-09-15 PROCEDURE — 99214 OFFICE O/P EST MOD 30 MIN: CPT | Performed by: INTERNAL MEDICINE

## 2023-09-15 PROCEDURE — G8752 SYS BP LESS 140: HCPCS | Performed by: INTERNAL MEDICINE

## 2023-09-15 PROCEDURE — 93000 ELECTROCARDIOGRAM COMPLETE: CPT | Performed by: INTERNAL MEDICINE

## 2023-09-15 PROCEDURE — G8755 DIAS BP > OR = 90: HCPCS | Performed by: INTERNAL MEDICINE

## 2023-09-15 PROCEDURE — G8753 SYS BP > OR = 140: HCPCS | Performed by: INTERNAL MEDICINE

## 2023-09-15 PROCEDURE — G8754 DIAS BP LESS 90: HCPCS | Performed by: INTERNAL MEDICINE

## 2023-09-15 RX ORDER — PANTOPRAZOLE SODIUM 40 MG/1
40 TABLET, DELAYED RELEASE ORAL DAILY
Qty: 90 TABLET | Refills: 3 | Status: SHIPPED | OUTPATIENT
Start: 2023-09-15 | End: 2024-10-25 | Stop reason: SDUPTHER

## 2023-09-15 RX ORDER — IRBESARTAN 75 MG/1
75 TABLET ORAL DAILY
Qty: 90 TABLET | Refills: 3 | Status: SHIPPED | OUTPATIENT
Start: 2023-09-15 | End: 2024-08-12 | Stop reason: ENTERED-IN-ERROR

## 2023-09-15 RX ORDER — ROSUVASTATIN CALCIUM 20 MG/1
20 TABLET, COATED ORAL NIGHTLY
Qty: 90 TABLET | Refills: 3 | Status: SHIPPED | OUTPATIENT
Start: 2023-09-15 | End: 2024-06-18

## 2023-09-15 NOTE — ASSESSMENT & PLAN NOTE
[employer] [place] XT DR MRI [id number] implanted on 11/18/2019.  Battery longevity estimated at 8.6 years until RRT.  Mode is DDD .  There is atrial pacing 44% of the time and V pacing 99.7% of the time there were no events on interrogation.  Atrial lead has a measured P wave of 3.1 mV, pacing threshold of 0.5 V at 0.4 ms, and a lead impedance of 627 ohms.  The RV lead is measured R wave of 9.6 mV, pacing threshold of 1.0 V 0.4 ms, and a lead impedance of 437 ohms.    Her device is MRI compatible.  If necessary, she can undergo an MRI.  There were no changes made to the permanent programming of her device.

## 2023-09-15 NOTE — PATIENT INSTRUCTIONS
Echo with definity during day of visit in 6 months  Start Irbesartan 75mg daily  Need to continue Eliquis life long

## 2023-09-15 NOTE — PROGRESS NOTES
Cardiology Note     9/16/2023            HPI   Jennifer Sams is a 78 y.o. woman who presents for follow up for dilated cardiomyopathy, today, 9/15/2023.    As you know, she has a history of a mild ischemic cardiomyopathy with a very recent reassuring left and right heart cath, heart block with pacemaker placed 2019 and CAD who has had significant dyspnea on exertion. She was evaluated at the [facility], then here by Dr. Tidwell from a pulmonary standpoint but a pulmonary cause was not found and PFTs are normal. Dr. Carolina previously made pacemaker setting adjustments with regards to AV delay which helped somewhat initially but not completely. She previously followed with Dr. Courtney who retired, and now Dr. Moncada who did a left and right heart cath in February of 2021 found low-normal filling pressures and no residual severe CAD.     Since her last visit she unfortunately suffered a large stroke in late June. An echo was unchanged from prior except showed an apical thrombus. She is now on systemic anticoagulation. She had aphasia and right sided weakness but is now doing outpatient rehab and has made good progress. She still has difficulty expressing herself despite telling me she knows what she wants to say. She has not been eating very well and lost 20 pounds.  She admits to a terrible diet and has no plans to change that. She denies exertional chest pain, dyspnea at rest, orthopnea, PND, edema or abdominal bloating. She has chronic back pain. No further syncopal episodes.      Past Medical History:   Diagnosis Date    AAA (abdominal aortic aneurysm) (CMS/[facility])     Arthritis     Elevated blood pressure reading without diagnosis of hypertension 04/19/2019    Epistaxis 01/06/2020    GERD (gastroesophageal reflux disease) 04/19/2019    Glaucoma 04/19/2019    Heart murmur     Hiatal hernia     History of hip replacement, total, right 04/19/2019    Right hip 9/ 2016 and revision 2017     History of  rheumatic fever as a child 2019    Hypercholesterolemia 2019    Ischemic cardiomyopathy 2019    Kidney cysts     MI (myocardial infarction) (CMS/[facility])     Osteoarthritis of multiple joints 2019    Osteoporosis     Pacemaker 2019    Raynaud's disease 2019    RSD (reflex sympathetic dystrophy) 2019    Of left foot    SOB (shortness of breath) 12/10/2019    Status post insertion of drug eluting coronary artery stent 2019    Stroke (CMS/[facility])      Past Surgical History:   Procedure Laterality Date    CHOLECYSTECTOMY OPEN      CORONARY ANGIOPLASTY WITH STENT PLACEMENT  2019    of LAD    CORONARY ANGIOPLASTY WITH STENT PLACEMENT  2019    of RCA    DILATION AND CURETTAGE OF UTERUS      EYE SURGERY      RIGHT CATARACT    FOOT SURGERY Left     JOINT REPLACEMENT Right 2016    total hip    REVISION TOTAL HIP ARTHROPLASTY Right 2017    TONSILLECTOMY         SOCIAL HISTORY  Social History     Tobacco Use    Smoking status: Never    Smokeless tobacco: Never   Vaping Use    Vaping Use: Never used   Substance Use Topics    Alcohol use: Not Currently    Drug use: No     Family Status   Relation Name Status    Bio Mother   at age 66    Bio Father   at age 77        pulmonary fibrosis    MGM      MGF      PGM   at age 90        natural causes    PGF          ALLERGIES  Ace inhibitors, Atorvastatin, Brilinta [ticagrelor], Codeine, Dilaudid [hydromorphone], Morphine sulfate, Onion, and Oxycodone      Outpatient Encounter Medications as of 9/15/2023:     apixaban (ELIQUIS) 5 mg tablet, Take 1 tablet (5 mg total) by mouth 2 (two) times a day Indications: treatment to prevent blood clots in chronic atrial fibrillation.    cycloSPORINE (RESTASIS) 0.05 % ophthalmic emulsion, Administer 1 drop into both eyes every 12 (twelve) hours.    irbesartan (AVAPRO) 75 mg tablet, Take 1 tablet (75 mg total) by  "mouth daily.    pantoprazole (PROTONIX) 40 mg EC tablet, Take 1 tablet (40 mg total) by mouth daily.    rosuvastatin (CRESTOR) 20 mg tablet, Take 1 tablet (20 mg total) by mouth nightly.    trazodone (DESYREL) 50 mg tablet, Take 0.5 tablets (25 mg total) by mouth nightly.    [DISCONTINUED] apixaban (ELIQUIS) 5 mg tablet, Take 1 tablet (5 mg total) by mouth 2 (two) times a day Indications: treatment to prevent blood clots in chronic atrial fibrillation.    [DISCONTINUED] lansoprazole (PREVACID) 3 mg/mL suspension oral suspension, Take 10 mL (30 mg total) by mouth daily before breakfast Indications: gastroesophageal reflux disease, heartburn, stress ulcer prevention. (Patient taking differently: Take 30 mg by mouth daily before breakfast.)    [DISCONTINUED] pantoprazole (PROTONIX) 40 mg EC tablet, Take 40 mg by mouth daily.    [DISCONTINUED] rosuvastatin (CRESTOR) 20 mg tablet, Take 1 tablet (20 mg total) by mouth nightly.    ROS   As per HPI and otherwise negative.  Additionally, weight loss, memory loss, arthritis and back pain.  Otherwise all relevant systems are reviewed and are negative.    Objective   Visit Vitals  /60   Pulse 63   Ht 1.727 m (5' 8\")   Wt 55.3 kg (122 lb)   SpO2 99%   BMI 18.55 kg/m²       Wt Readings from Last 3 Encounters:   09/15/23 55.3 kg (122 lb)   09/15/23 55.3 kg (122 lb)   08/28/23 54.9 kg (121 lb)       Physical Exam  Vitals reviewed.   Constitutional:       Appearance: She is well-developed.   HENT:      Head: Normocephalic.   Eyes:      General: No scleral icterus.  Neck:      Vascular: No JVD.   Cardiovascular:      Rate and Rhythm: Normal rate and regular rhythm.      Heart sounds: Normal heart sounds.   Pulmonary:      Breath sounds: Normal breath sounds.   Skin:     General: Skin is warm and dry.   Neurological:      Mental Status: She is alert and oriented to person, place, and time.      Comments: Right sided weakness. Moderate expressive aphasia   Psychiatric:    "      Judgment: Judgment normal.         Lab Results   Component Value Date    GLUCOSE 90 07/10/2023    CALCIUM 8.9 07/10/2023     07/10/2023    K 4.2 07/10/2023    CO2 27 07/10/2023     07/10/2023    BUN 11 07/10/2023    CREATININE 0.8 07/10/2023     Lab Results   Component Value Date    ALT 9 07/02/2023    AST 22 07/02/2023    ALKPHOS 51 07/02/2023    BILITOT 0.8 07/02/2023     Lab Results   Component Value Date    WBC 5.12 07/10/2023    HGB 15.0 07/10/2023    HCT 45.0 07/10/2023    MCV 93.8 07/10/2023     07/10/2023     Lab Results   Component Value Date    TSH 2.94 04/27/2022     Lab Results   Component Value Date    CHOL 178 06/26/2023    CHOL 163 03/31/2023    CHOL 156 02/04/2022     Lab Results   Component Value Date    HDL 71 06/26/2023    HDL 80 03/31/2023    HDL 71 02/04/2022     Lab Results   Component Value Date    LDLCALC 88 06/26/2023    LDLCALC 66 03/31/2023    LDLCALC 66 02/04/2022     Lab Results   Component Value Date    TRIG 93 06/26/2023    TRIG 87 03/31/2023    TRIG 107 02/04/2022     No results found for: CHOLHDL  Lab Results   Component Value Date    HGBA1C 5.4 06/26/2023        EKG    Performed on 9/15/2023 and personally reviewed:  Sinus at 62 bpm   V-paced    Imaging    Echo 11/29/19:  Technically limited study no gross chamber enlargement  Imaging done just to exclude pericardial effusion Limited echo study  No regional wall motion abnormality preserved ejection fraction 55 to 60%  Aortic sclerosis  Mitral tricuspid valves appear normal  Prominent anterior fat pad no evidence of pericardial effusion or tamponade    CXR 1/17/2020:  No active disease.    PFTs 12/24/20:        LHC/RHC 2/12/21:  1. Normal right and left heart filling pressures (RA 3, PA 20/5 with mean of 11 mmHg, and PCW 6 mm Hg).  2. Patent prior stents in the mid and distal LAD.  3. 50% ostial Cx stenosis, not functionally significant by iFR (0.95). Patent proximal Cx stent.  4. Patent mid and distal RCA  stents.    Echo 2/17/21:  · Left Ventricle: Normal ventricle size. Normal wall thickness. Mildly decreased systolic function. Estimated EF 45- 50%. No regional wall motion abnormalities. Grade I diastolic dysfunction. Diastolic inflow pattern consistent with impaired relaxation. Normal left atrial pressure.  · Aortic Valve: Tricuspid valve. Sclerotic leaflets.  · Sinuses of Valsalva normal-sized. Ascending aorta normal-sized.  · Mild mitral annular calcification.  · Left Atrium: Normal-sized atrium. Normal doppler flow of pulmonary veins.  · Right Ventricle: Normal ventricle size. Low normal systolic function. Pacer wire present. Normal wall thickness.  · Right Atrium: Normal-sized atrium. Pacer/ICD lead present.  · Tricuspid Valve: Normal structure. Mild regurgitation. Estimated RVSP = 25 mmHg. The regurgitation jet is central. Normal hepatic vein systolic flow.  · IVC/SVC: Normal-sized IVC. IVC collapses >50% during inspiration. Normal hepatic vein flow.  · Pericardium: Normal structure.     CPT July 2021  CARDIOPULMONARY GAS EXCHANGE:  1. The test was near-maximal as defined by a respiratory exchange ratio of 1.10(normal >1.10).  2. The peak VO2 was 14.7 cc/kg/min which is 62% predicted (normal >85%).  3. The anaerobic threshold was 39% predicted (normal >40%).  4. The peak minute ventilation was 43L/min yielding a normal breathing reserve of 52% (normal >30%).  5. The VE/VCO2 slope was 43(normal <34).  6. Resting spirometry showed an FVC of 98%, FEV1 of 99% consistent with normal spirometry.     CONCLUSIONS:  1. By respiratory exchange ratio, the test was near maximal thus peak VO2 achieved may slightly underestimate her true exercise capacity.  2. Peak VO2 achieved was 14.7 cc/kg/min which is only 62% of predicted.  In combination with a low anaerobic threshold there is likely an element of deconditioning.  3. Her breathing reserve and spirometry suggest a cardiac rather than pulmonary limitation to  exercise.  4. Suggest a regular exercise regimen to potentially increase functional capacity.     TTE 2-9-22  · Left Ventricle: Normal ventricle size. Mild concentric left ventricular hypertrophy. Mild-to-moderately decreased systolic function. Estimated EF 40- 45%. LV SVI low at 28 mL/m² per beat. Basal and mid inferolateral akinesis, mid and apical anteroseptal akinesisGrade I diastolic dysfunction. Diastolic inflow pattern consistent with impaired relaxation. Normal left atrial pressure.  · Tricuspid aortic valve. Sclerotic aortic valve leaflets.  · Aorta: Sinuses of Valsalva normal-sized.  · Normal leaflet structure of the mitral valve.  · Normal-sized LA. Doppler flow of pulmonary veins not obtained.  · Normal-sized RV. Mildly reduced RV systolic function. Pacemaker/ICD lead present in the RV. Normal RV wall thickness. Mid free wall akinetic.  · Normal-sized RA. Pacemaker wire present in the RA.  · Tricuspid Valve: Normal structure. Trace regurgitation. Estimated RVSP = 27 mmHg.  · Pulmonic valve not well visualized.  · IVC collapses <50% during inspiration.  · Evidence of a prominent pericardial fat pad.    [employer] 3-11-22  1. This is a very technically difficult study.  Patient showed excessive motion during image acquisition, imaged with arms at sides, and diaphragmatic and GI interference.   2. Regadenoson technetium tetrofosmin shows a small, fixed defect in the apical to mid inferior wall, suggestive of tissue attenuation, although scar cannot be excluded. There is no myocardium at risk.   3. EKG is non-diagnostic due to ventricular pacing.  4. Gated SPECT imaging was unable to be obtained.   5. No prior study for comparison. Prior nuclear imaging was non-diagnostic, exercise testing.     Echo 9/13/2022:    Left Ventricle: Normal ventricle size. Normal wall thickness. Mildly decreased systolic function. Estimated EF 45-50%. Basal and mid inferolateral akinesis, mid and apical anteroseptal/septal  akinesis.    Aortic Valve: Valve not well seen but likely trileaflet.  Sclerotic leaflets. No regurgitation. No stenosis.    Aorta: Aortic root normal.    Right Ventricle: Ventricle not well visualized but probably normal size and at most mildly dilated. Catheter present. Normal systolic function.    Tricuspid Valve: Mild regurgitation. Estimated RVSP = 26 mmHg.    Pericardium: Small anterior pericardial effusion    Left Atrium: Normal sized atrium.    Right Atrium: Normal sized atrium.    Mitral Valve: No regurgitation.    Pulmonic Valve: Valve not well visualized. No regurgitation.    IVC/SVC: Normal sized inferior vena cava. Inferior vena cava collapses >50% during inspiration.    Compared with February 2022, no significant change.    I personally reviewed results with her in the office today.     Abdominal US 10/18/2022:   Aortic ectasia noted, measuring about 2.8 cm in maximal diameter    Echo 3/28/2023:    Left Ventricle: Normal ventricle size. Normal wall thickness. Mildly decreased systolic function. Estimated EF 45-50%. Basal and mid inferolateral akinesis, mid and apical anteroseptal/septal akinesis.    Aortic Valve: Valve not well seen but likely trileaflet.  Sclerotic leaflets. No regurgitation. No stenosis.    Aorta: Aortic root normal.    Right Ventricle: Ventricle not well visualized but probably normal size and at most mildly dilated. Catheter present. Normal systolic function.    Tricuspid Valve: Mild regurgitation. Estimated RVSP = 26 mmHg.    Pericardium: Small anterior pericardial effusion    Left Atrium: Normal sized atrium.    Right Atrium: Normal sized atrium.    Mitral Valve: No regurgitation.    Pulmonic Valve: Valve not well visualized. No regurgitation.    IVC/SVC: Normal sized inferior vena cava. Inferior vena cava collapses >50% during inspiration.    Compared with February 2022, no significant change.    I personally reviewed results with her in the office  today.    Echo 6/27/2023   Left ventricle with normal dimensions and regional contraction abnormalities consistent with CAD in the LAD distribution: Localized apical infarction with aneurysm formation.  Paradoxical septal movement is consistent with ventricular pacing.  There is moderate left ventricular systolic and mild diastolic dysfunction.  LVEF 43%   Left ventricular apical thrombus   Left atrial pressure 14 mmHg   There is a visual suggestion of mild aortic stenosis. (Doppler assessment of the aortic valve is suboptimal)   Mild mitral regurgitation   Normal right ventricle dimensions and systolic function: TAPSE 1.84 cm   Mild tricuspid regurgitation   PASP 34 mmHg   Normal left atrial volume index   No pericardial effusion or vegetations   AS-V pacing   Technically difficult study: No parasternal imaging obtained   This study was performed with Definity contrast to optimize evaluation of regional and global left ventricular function   No significant change from 3/28/2023 except for current demonstration of left ventricular apical thrombus      ASSESSMENT AND PLAN    1. Chronic systolic and diastolic CHF, ACC Stage C, NYHA class 3  No extra volume on exam and a right heart cath showed low filling pressures, so diuretics are not indicated.  She is concerned about potential cost of Entresto and given the dizziness described I will avoid anything with a diuretic effect for now thus the decision to go with valsartan to start. She held of on starting this with dizziness concerns but now willing, switched to Irbesartan due to cost. Her blood pressure has been stable.  Her echocardiogram is stable today.    2. CAD.  non-STEMI 4/26/19, three-vessel CAD; status post FRANK x 2 LAD 4/27/19 + FRANK x 4 RCA 4/29/19 + FRANK x 1 LCX OM2 5/2/19  She will continue aspirin. She also will continue statin therapy.  Dr. Moncada attempted to keep her on dual antiplatelet therapy given her numerous stents but ultimately could  not be tolerated with incessant nosebleeds.  Thankfully this is resolved with switching to monotherapy.    3. Dyslipidemia  She should continue with rosuvastatin therapy.  She admits to a terrible diet as well as tells me she is not going to change that.  Fortunately her lipids are well controlled on rosuvastatin.    4. Discoloration of toes, concern for peripheral vascular disease  She describes what could be Raynaud's of her feet but does not get it in her hands.  She does have reduced pulses in her left foot.  I therefore have ordered ankle-brachial index and ultrasound to look at her arterial circulation. She was found to have small vessel disease.     5. LV apical thrombus  Found when CVA occurred June 2023. Should stay on lifelong anticoagulation, continue Eliquis,.     6.  Abdominal aortic aneurysm.  2.9 cm on imaging in 2018.  A follow-up ultrasound in the fall showed no significant growth.    7.  Mobitz 2 AV block status post pacemaker 2019.  She follows with Dr. Carolina. No dysrhythmias found on interrogation today by her friend's report.    8. CVA  Found to have LV apical thrombus, so likely embolic. Should stay on Eliquis.     Thank you for allowing me to participate in the care of this patient.  I reviewed interim records from her primary physician as well as electrophysiologist.  I reviewed her labs, echo and hospitalization records.  I added Irbesartan.  I will plan to see her back in 6 months with an echocardiogram.  Please not hesitate to contact me with any questions or concerns.     Sincerely,    Jermaine Salter MD  9/15/2023

## 2023-09-15 NOTE — ASSESSMENT & PLAN NOTE
Her most recent echocardiogram demonstrated an EF of 40 to 45%; with apical aneurysm small thrombus.  She is euvolemic on exam today. She follows closely with Dr. Salter.

## 2023-09-15 NOTE — LETTER
September 16, 2023     Chris Wang MD  [address]  [address]  [facility] [address]    Patient: Jennifer Sams  YOB: 1945  Date of Visit: 9/15/2023      Dear Dr. Wang:    Thank you for referring Jennifer Sams to me for evaluation. Below are my notes for this consultation.    If you have questions, please do not hesitate to call me. I look forward to following your patient along with you.         Sincerely,        Jermaine Salter MD        CC: MD Jermaine Wilcox MD Domsky, Steven M, MD  9/16/2023 11:58 PM  Sign when Signing Visit     Cardiology Note     9/16/2023            HPI   Jennifer Sams is a 78 y.o. woman who presents for follow up for dilated cardiomyopathy, today, 9/15/2023.    As you know, she has a history of a mild ischemic cardiomyopathy with a very recent reassuring left and right heart cath, heart block with pacemaker placed 2019 and CAD who has had significant dyspnea on exertion. She was evaluated at the [facility], then here by Dr. Tidwell from a pulmonary standpoint but a pulmonary cause was not found and PFTs are normal. Dr. Carolina previously made pacemaker setting adjustments with regards to AV delay which helped somewhat initially but not completely. She previously followed with Dr. Courtney who retired, and now Dr. Moncada who did a left and right heart cath in February of 2021 found low-normal filling pressures and no residual severe CAD.     Since her last visit she unfortunately suffered a large stroke in late June. An echo was unchanged from prior except showed an apical thrombus. She is now on systemic anticoagulation. She had aphasia and right sided weakness but is now doing outpatient rehab and has made good progress. She still has difficulty expressing herself despite telling me she knows what she wants to say. She has not been eating very well and lost 20 pounds.  She admits to a terrible diet and has no plans to change that. She  denies exertional chest pain, dyspnea at rest, orthopnea, PND, edema or abdominal bloating. She has chronic back pain. No further syncopal episodes.      Past Medical History:   Diagnosis Date    AAA (abdominal aortic aneurysm) (CMS/[facility])     Arthritis     Elevated blood pressure reading without diagnosis of hypertension 2019    Epistaxis 2020    GERD (gastroesophageal reflux disease) 2019    Glaucoma 2019    Heart murmur     Hiatal hernia     History of hip replacement, total, right 2019    Right hip 2016 and revision 2017     History of rheumatic fever as a child 2019    Hypercholesterolemia 2019    Ischemic cardiomyopathy 2019    Kidney cysts     MI (myocardial infarction) (CMS/[facility])     Osteoarthritis of multiple joints 2019    Osteoporosis     Pacemaker 2019    Raynaud's disease 2019    RSD (reflex sympathetic dystrophy) 2019    Of left foot    SOB (shortness of breath) 12/10/2019    Status post insertion of drug eluting coronary artery stent 2019    Stroke (CMS/[facility])      Past Surgical History:   Procedure Laterality Date    CHOLECYSTECTOMY OPEN      CORONARY ANGIOPLASTY WITH STENT PLACEMENT  2019    of LAD    CORONARY ANGIOPLASTY WITH STENT PLACEMENT  2019    of RCA    DILATION AND CURETTAGE OF UTERUS      EYE SURGERY      RIGHT CATARACT    FOOT SURGERY Left     JOINT REPLACEMENT Right 2016    total hip    REVISION TOTAL HIP ARTHROPLASTY Right 2017    TONSILLECTOMY         SOCIAL HISTORY  Social History     Tobacco Use    Smoking status: Never    Smokeless tobacco: Never   Vaping Use    Vaping Use: Never used   Substance Use Topics    Alcohol use: Not Currently    Drug use: No     Family Status   Relation Name Status    Bio Mother   at age 66    Bio Father   at age 77        pulmonary fibrosis    MGM      MGF      PGM   at age 90         "natural causes    PGF          ALLERGIES  Ace inhibitors, Atorvastatin, Brilinta [ticagrelor], Codeine, Dilaudid [hydromorphone], Morphine sulfate, Onion, and Oxycodone      Outpatient Encounter Medications as of 9/15/2023:     apixaban (ELIQUIS) 5 mg tablet, Take 1 tablet (5 mg total) by mouth 2 (two) times a day Indications: treatment to prevent blood clots in chronic atrial fibrillation.    cycloSPORINE (RESTASIS) 0.05 % ophthalmic emulsion, Administer 1 drop into both eyes every 12 (twelve) hours.    irbesartan (AVAPRO) 75 mg tablet, Take 1 tablet (75 mg total) by mouth daily.    pantoprazole (PROTONIX) 40 mg EC tablet, Take 1 tablet (40 mg total) by mouth daily.    rosuvastatin (CRESTOR) 20 mg tablet, Take 1 tablet (20 mg total) by mouth nightly.    trazodone (DESYREL) 50 mg tablet, Take 0.5 tablets (25 mg total) by mouth nightly.    [DISCONTINUED] apixaban (ELIQUIS) 5 mg tablet, Take 1 tablet (5 mg total) by mouth 2 (two) times a day Indications: treatment to prevent blood clots in chronic atrial fibrillation.    [DISCONTINUED] lansoprazole (PREVACID) 3 mg/mL suspension oral suspension, Take 10 mL (30 mg total) by mouth daily before breakfast Indications: gastroesophageal reflux disease, heartburn, stress ulcer prevention. (Patient taking differently: Take 30 mg by mouth daily before breakfast.)    [DISCONTINUED] pantoprazole (PROTONIX) 40 mg EC tablet, Take 40 mg by mouth daily.    [DISCONTINUED] rosuvastatin (CRESTOR) 20 mg tablet, Take 1 tablet (20 mg total) by mouth nightly.    ROS   As per HPI and otherwise negative.  Additionally, weight loss, memory loss, arthritis and back pain.  Otherwise all relevant systems are reviewed and are negative.    Objective   Visit Vitals  /60   Pulse 63   Ht 1.727 m (5' 8\")   Wt 55.3 kg (122 lb)   SpO2 99%   BMI 18.55 kg/m²       Wt Readings from Last 3 Encounters:   09/15/23 55.3 kg (122 lb)   09/15/23 55.3 kg (122 lb)   23 54.9 kg (121 lb) "       Physical Exam  Vitals reviewed.   Constitutional:       Appearance: She is well-developed.   HENT:      Head: Normocephalic.   Eyes:      General: No scleral icterus.  Neck:      Vascular: No JVD.   Cardiovascular:      Rate and Rhythm: Normal rate and regular rhythm.      Heart sounds: Normal heart sounds.   Pulmonary:      Breath sounds: Normal breath sounds.   Skin:     General: Skin is warm and dry.   Neurological:      Mental Status: She is alert and oriented to person, place, and time.      Comments: Right sided weakness. Moderate expressive aphasia   Psychiatric:         Judgment: Judgment normal.         Lab Results   Component Value Date    GLUCOSE 90 07/10/2023    CALCIUM 8.9 07/10/2023     07/10/2023    K 4.2 07/10/2023    CO2 27 07/10/2023     07/10/2023    BUN 11 07/10/2023    CREATININE 0.8 07/10/2023     Lab Results   Component Value Date    ALT 9 07/02/2023    AST 22 07/02/2023    ALKPHOS 51 07/02/2023    BILITOT 0.8 07/02/2023     Lab Results   Component Value Date    WBC 5.12 07/10/2023    HGB 15.0 07/10/2023    HCT 45.0 07/10/2023    MCV 93.8 07/10/2023     07/10/2023     Lab Results   Component Value Date    TSH 2.94 04/27/2022     Lab Results   Component Value Date    CHOL 178 06/26/2023    CHOL 163 03/31/2023    CHOL 156 02/04/2022     Lab Results   Component Value Date    HDL 71 06/26/2023    HDL 80 03/31/2023    HDL 71 02/04/2022     Lab Results   Component Value Date    LDLCALC 88 06/26/2023    LDLCALC 66 03/31/2023    LDLCALC 66 02/04/2022     Lab Results   Component Value Date    TRIG 93 06/26/2023    TRIG 87 03/31/2023    TRIG 107 02/04/2022     No results found for: CHOLHDL  Lab Results   Component Value Date    HGBA1C 5.4 06/26/2023        EKG    Performed on 9/15/2023 and personally reviewed:  Sinus at 62 bpm   V-paced    Imaging    Echo 11/29/19:  Technically limited study no gross chamber enlargement  Imaging done just to exclude pericardial effusion Limited  echo study  No regional wall motion abnormality preserved ejection fraction 55 to 60%  Aortic sclerosis  Mitral tricuspid valves appear normal  Prominent anterior fat pad no evidence of pericardial effusion or tamponade    CXR 1/17/2020:  No active disease.    PFTs 12/24/20:        LHC/RHC 2/12/21:  1. Normal right and left heart filling pressures (RA 3, PA 20/5 with mean of 11 mmHg, and PCW 6 mm Hg).  2. Patent prior stents in the mid and distal LAD.  3. 50% ostial Cx stenosis, not functionally significant by iFR (0.95). Patent proximal Cx stent.  4. Patent mid and distal RCA stents.    Echo 2/17/21:  Left Ventricle: Normal ventricle size. Normal wall thickness. Mildly decreased systolic function. Estimated EF 45- 50%. No regional wall motion abnormalities. Grade I diastolic dysfunction. Diastolic inflow pattern consistent with impaired relaxation. Normal left atrial pressure.  Aortic Valve: Tricuspid valve. Sclerotic leaflets.  Sinuses of Valsalva normal-sized. Ascending aorta normal-sized.  Mild mitral annular calcification.  Left Atrium: Normal-sized atrium. Normal doppler flow of pulmonary veins.  Right Ventricle: Normal ventricle size. Low normal systolic function. Pacer wire present. Normal wall thickness.  Right Atrium: Normal-sized atrium. Pacer/ICD lead present.  Tricuspid Valve: Normal structure. Mild regurgitation. Estimated RVSP = 25 mmHg. The regurgitation jet is central. Normal hepatic vein systolic flow.  IVC/SVC: Normal-sized IVC. IVC collapses >50% during inspiration. Normal hepatic vein flow.  Pericardium: Normal structure.     CPT July 2021  CARDIOPULMONARY GAS EXCHANGE:  The test was near-maximal as defined by a respiratory exchange ratio of 1.10(normal >1.10).  The peak VO2 was 14.7 cc/kg/min which is 62% predicted (normal >85%).  The anaerobic threshold was 39% predicted (normal >40%).  The peak minute ventilation was 43L/min yielding a normal breathing reserve of 52% (normal >30%).  The  VE/VCO2 slope was 43(normal <34).  Resting spirometry showed an FVC of 98%, FEV1 of 99% consistent with normal spirometry.     CONCLUSIONS:  By respiratory exchange ratio, the test was near maximal thus peak VO2 achieved may slightly underestimate her true exercise capacity.  Peak VO2 achieved was 14.7 cc/kg/min which is only 62% of predicted.  In combination with a low anaerobic threshold there is likely an element of deconditioning.  Her breathing reserve and spirometry suggest a cardiac rather than pulmonary limitation to exercise.  Suggest a regular exercise regimen to potentially increase functional capacity.     TTE 2-9-22  Left Ventricle: Normal ventricle size. Mild concentric left ventricular hypertrophy. Mild-to-moderately decreased systolic function. Estimated EF 40- 45%. LV SVI low at 28 mL/m² per beat. Basal and mid inferolateral akinesis, mid and apical anteroseptal akinesisGrade I diastolic dysfunction. Diastolic inflow pattern consistent with impaired relaxation. Normal left atrial pressure.  Tricuspid aortic valve. Sclerotic aortic valve leaflets.  Aorta: Sinuses of Valsalva normal-sized.  Normal leaflet structure of the mitral valve.  Normal-sized LA. Doppler flow of pulmonary veins not obtained.  Normal-sized RV. Mildly reduced RV systolic function. Pacemaker/ICD lead present in the RV. Normal RV wall thickness. Mid free wall akinetic.  Normal-sized RA. Pacemaker wire present in the RA.  Tricuspid Valve: Normal structure. Trace regurgitation. Estimated RVSP = 27 mmHg.  Pulmonic valve not well visualized.  IVC collapses <50% during inspiration.  Evidence of a prominent pericardial fat pad.    [employer] 3-11-22  1. This is a very technically difficult study.  Patient showed excessive motion during image acquisition, imaged with arms at sides, and diaphragmatic and GI interference.   2. Regadenoson technetium tetrofosmin shows a small, fixed defect in the apical to mid inferior wall, suggestive of  tissue attenuation, although scar cannot be excluded. There is no myocardium at risk.   3. EKG is non-diagnostic due to ventricular pacing.  4. Gated SPECT imaging was unable to be obtained.   5. No prior study for comparison. Prior nuclear imaging was non-diagnostic, exercise testing.     Echo 9/13/2022:    Left Ventricle: Normal ventricle size. Normal wall thickness. Mildly decreased systolic function. Estimated EF 45-50%. Basal and mid inferolateral akinesis, mid and apical anteroseptal/septal akinesis.    Aortic Valve: Valve not well seen but likely trileaflet.  Sclerotic leaflets. No regurgitation. No stenosis.    Aorta: Aortic root normal.    Right Ventricle: Ventricle not well visualized but probably normal size and at most mildly dilated. Catheter present. Normal systolic function.    Tricuspid Valve: Mild regurgitation. Estimated RVSP = 26 mmHg.    Pericardium: Small anterior pericardial effusion    Left Atrium: Normal sized atrium.    Right Atrium: Normal sized atrium.    Mitral Valve: No regurgitation.    Pulmonic Valve: Valve not well visualized. No regurgitation.    IVC/SVC: Normal sized inferior vena cava. Inferior vena cava collapses >50% during inspiration.    Compared with February 2022, no significant change.    I personally reviewed results with her in the office today.     Abdominal US 10/18/2022:  Aortic ectasia noted, measuring about 2.8 cm in maximal diameter    Echo 3/28/2023:    Left Ventricle: Normal ventricle size. Normal wall thickness. Mildly decreased systolic function. Estimated EF 45-50%. Basal and mid inferolateral akinesis, mid and apical anteroseptal/septal akinesis.    Aortic Valve: Valve not well seen but likely trileaflet.  Sclerotic leaflets. No regurgitation. No stenosis.    Aorta: Aortic root normal.    Right Ventricle: Ventricle not well visualized but probably normal size and at most mildly dilated. Catheter present. Normal systolic function.     Tricuspid Valve: Mild regurgitation. Estimated RVSP = 26 mmHg.    Pericardium: Small anterior pericardial effusion    Left Atrium: Normal sized atrium.    Right Atrium: Normal sized atrium.    Mitral Valve: No regurgitation.    Pulmonic Valve: Valve not well visualized. No regurgitation.    IVC/SVC: Normal sized inferior vena cava. Inferior vena cava collapses >50% during inspiration.    Compared with February 2022, no significant change.    I personally reviewed results with her in the office today.    Echo 6/27/2023  Left ventricle with normal dimensions and regional contraction abnormalities consistent with CAD in the LAD distribution: Localized apical infarction with aneurysm formation.  Paradoxical septal movement is consistent with ventricular pacing.  There is moderate left ventricular systolic and mild diastolic dysfunction.  LVEF 43%  Left ventricular apical thrombus  Left atrial pressure 14 mmHg  There is a visual suggestion of mild aortic stenosis. (Doppler assessment of the aortic valve is suboptimal)  Mild mitral regurgitation  Normal right ventricle dimensions and systolic function: TAPSE 1.84 cm  Mild tricuspid regurgitation  PASP 34 mmHg  Normal left atrial volume index  No pericardial effusion or vegetations  AS-V pacing  Technically difficult study: No parasternal imaging obtained  This study was performed with Definity contrast to optimize evaluation of regional and global left ventricular function  No significant change from 3/28/2023 except for current demonstration of left ventricular apical thrombus      ASSESSMENT AND PLAN    1. Chronic systolic and diastolic CHF, ACC Stage C, NYHA class 3  No extra volume on exam and a right heart cath showed low filling pressures, so diuretics are not indicated.  She is concerned about potential cost of Entresto and given the dizziness described I will avoid anything with a diuretic effect for now thus the decision to go with valsartan to start. She  held of on starting this with dizziness concerns but now willing, switched to Irbesartan due to cost. Her blood pressure has been stable.  Her echocardiogram is stable today.    2. CAD.  non-STEMI 4/26/19, three-vessel CAD; status post FRANK x 2 LAD 4/27/19 + FRANK x 4 RCA 4/29/19 + FRANK x 1 LCX OM2 5/2/19  She will continue aspirin. She also will continue statin therapy.  Dr. Moncada attempted to keep her on dual antiplatelet therapy given her numerous stents but ultimately could not be tolerated with incessant nosebleeds.  Thankfully this is resolved with switching to monotherapy.    3. Dyslipidemia  She should continue with rosuvastatin therapy.  She admits to a terrible diet as well as tells me she is not going to change that.  Fortunately her lipids are well controlled on rosuvastatin.    4. Discoloration of toes, concern for peripheral vascular disease  She describes what could be Raynaud's of her feet but does not get it in her hands.  She does have reduced pulses in her left foot.  I therefore have ordered ankle-brachial index and ultrasound to look at her arterial circulation. She was found to have small vessel disease.     5. LV apical thrombus  Found when CVA occurred June 2023. Should stay on lifelong anticoagulation, continue Eliquis,.     6.  Abdominal aortic aneurysm.  2.9 cm on imaging in 2018.  A follow-up ultrasound in the fall showed no significant growth.    7.  Mobitz 2 AV block status post pacemaker 2019.  She follows with Dr. Carolina. No dysrhythmias found on interrogation today by her friend's report.    8. CVA  Found to have LV apical thrombus, so likely embolic. Should stay on Eliquis.     Thank you for allowing me to participate in the care of this patient.  I reviewed interim records from her primary physician as well as electrophysiologist.  I reviewed her labs, echo and hospitalization records.  I added Irbesartan.  I will plan to see her back in 6 months with an echocardiogram.  Please not  hesitate to contact me with any questions or concerns.     Sincerely,    Jermaine Salter MD  9/15/2023

## 2023-09-15 NOTE — ASSESSMENT & PLAN NOTE
She presented to [facility] on 6/2 where she was found to be confused and aphasic with right sided weakness. CT revealed acute infarct in left MCA. Echo showed an EF of 43% and localized apical aneurysm containing thrombus. She was started on Eliquis 5mg BID.     With discussion of her friend, it appears her symptoms have improved remarkably over the past couple months.  Though she remains frustrated, she was encouraged to continue with her rehab and therapy.

## 2023-09-15 NOTE — LETTER
September 16, 2023     Chris Wang MD  [address]  [address]  [facility] [address]    Patient: Jennifer Sams  YOB: 1945  Date of Visit: 9/15/2023      Dear Dr. Wang:    Thank you for referring Jennifer Sams to me for evaluation. Below are my notes for this consultation.    If you have questions, please do not hesitate to call me. I look forward to following your patient along with you.         Sincerely,        Jermaine Carolina MD        CC: No Recipients    Jermaine Carolina MD  9/16/2023 12:16 PM  Signed       Electrophysiology       Reason for visit: Follow-up   HPI   Jennifer Sams is a 78 y.o. female who comes to the office today for follow-up of her device and related arrhythmia issues.  She is status post dual-chamber pacemaker secondary to 2-1 AV block.  Her past medical history is significant for CAD requiring 8 stents including stents to her LAD, RCA, and circumflex and mild ischemic cardiomyopathy.    Since her last visit she presented to [facility] on June 26 after she was found confused and combative.  In the emergency department she was aphasic with right-sided weakness and hypertensive to the 190 systolic.  CT scan of the head revealed an acute infarct in left MCA with mild bleeding.  Echo showed an EF of 43% and localized apical aneurysm containing thrombus. A bubble study was negative. Etiology of her stroke was thought to be due to the localized apical aneurysm containing thrombus and she was started on anticoagulation with heparin and was later transitioned to Eliquis. Her telemetry during the admission shows atrial sensing with ventricular pacing. No atrial fibrillation was seen on monitor or her pacemaker.    Since that time has been doing well from cardiac standpoint, but remains very frustrated with her neurologic symptoms.  She continues to have significant aphasia, but according to her friend this is markedly improved since her stroke.  Her friend  also notes marked improvement in her right sided weakness.        Past Medical History:   Diagnosis Date    AAA (abdominal aortic aneurysm) (CMS/[facility])     Arthritis     Elevated blood pressure reading without diagnosis of hypertension 04/19/2019    Epistaxis 01/06/2020    GERD (gastroesophageal reflux disease) 04/19/2019    Glaucoma 04/19/2019    Heart murmur     Hiatal hernia     History of hip replacement, total, right 04/19/2019    Right hip 9/ 2016 and revision 2017     History of rheumatic fever as a child 04/19/2019    Hypercholesterolemia 04/19/2019    Ischemic cardiomyopathy 05/09/2019    Kidney cysts     MI (myocardial infarction) (CMS/[facility])     Osteoarthritis of multiple joints 04/19/2019    Osteoporosis     Pacemaker 12/09/2019    Raynaud's disease 04/19/2019    RSD (reflex sympathetic dystrophy) 04/19/2019    Of left foot    SOB (shortness of breath) 12/10/2019    Status post insertion of drug eluting coronary artery stent 05/09/2019    Stroke (CMS/[facility])      Past Surgical History:   Procedure Laterality Date    CHOLECYSTECTOMY OPEN      CORONARY ANGIOPLASTY WITH STENT PLACEMENT  04/29/2019    of LAD    CORONARY ANGIOPLASTY WITH STENT PLACEMENT  04/30/2019    of RCA    DILATION AND CURETTAGE OF UTERUS      EYE SURGERY      RIGHT CATARACT    FOOT SURGERY Left     JOINT REPLACEMENT Right 09/2016    total hip    REVISION TOTAL HIP ARTHROPLASTY Right 2017    TONSILLECTOMY       Allergies:  Ace inhibitors, Atorvastatin, Brilinta [ticagrelor], Codeine, Dilaudid [hydromorphone], Morphine sulfate, Onion, and Oxycodone    Current Outpatient Medications on File Prior to Visit   Medication Sig Dispense Refill    apixaban (ELIQUIS) 5 mg tablet Take 1 tablet (5 mg total) by mouth 2 (two) times a day Indications: treatment to prevent blood clots in chronic atrial fibrillation. 180 tablet 3    cycloSPORINE (RESTASIS) 0.05 % ophthalmic emulsion Administer 1 drop into both eyes every 12  (twelve) hours. 3 each 0    irbesartan (AVAPRO) 75 mg tablet Take 1 tablet (75 mg total) by mouth daily. 90 tablet 3    pantoprazole (PROTONIX) 40 mg EC tablet Take 1 tablet (40 mg total) by mouth daily. 90 tablet 3    rosuvastatin (CRESTOR) 20 mg tablet Take 1 tablet (20 mg total) by mouth nightly. 90 tablet 3    trazodone (DESYREL) 50 mg tablet Take 0.5 tablets (25 mg total) by mouth nightly. 15 tablet 0     No current facility-administered medications on file prior to visit.     Social History     Socioeconomic History    Marital status: Single     Spouse name: None    Number of children: None    Years of education: None    Highest education level: None   Tobacco Use    Smoking status: Never    Smokeless tobacco: Never   Vaping Use    Vaping Use: Never used   Substance and Sexual Activity    Alcohol use: Not Currently    Drug use: No    Sexual activity: Not Currently     Social Determinants of Health     Financial Resource Strain: Low Risk  (7/3/2023)    Overall Financial Resource Strain (CARDIA)     Difficulty of Paying Living Expenses: Not hard at all   Food Insecurity: No Food Insecurity (9/14/2023)    Hunger Vital Sign     Worried About Running Out of Food in the Last Year: Never true     Ran Out of Food in the Last Year: Never true   Transportation Needs: No Transportation Needs (7/3/2023)    PRAPARE - Transportation     Lack of Transportation (Medical): No     Lack of Transportation (Non-Medical): No   Housing Stability: Low Risk  (7/3/2023)    Housing Stability Vital Sign     Unable to Pay for Housing in the Last Year: No     Number of Places Lived in the Last Year: 1     Unstable Housing in the Last Year: No     Family History   Problem Relation Age of Onset    Congenital heart disease Biological Mother         noted at autopsy    Pulmonary fibrosis Biological Father     Heart attack Paternal Grandfather      Review of Systems   Constitutional: Negative.    HENT: Negative.     Eyes: Negative.    Respiratory: Negative.    Cardiovascular: Negative.    Gastrointestinal: Negative.    Endocrine: Negative.    Genitourinary: Negative.    Skin: Negative.    Allergic/Immunologic: Negative.    Neurological: Positive for speech difficulty and weakness.   Hematological: Negative.    Psychiatric/Behavioral: Negative.      Vitals:    09/15/23 1318   BP: (!) 150/92   Pulse: 62   Resp: 16     Wt Readings from Last 3 Encounters:   09/15/23 55.3 kg (122 lb)   09/15/23 55.3 kg (122 lb)   08/28/23 54.9 kg (121 lb)     Physical Exam  Constitutional:       Appearance: Normal appearance. She is normal weight.   HENT:      Head: Normocephalic and atraumatic.   Cardiovascular:      Rate and Rhythm: Normal rate and regular rhythm.      Pulses: Normal pulses.      Heart sounds: Normal heart sounds.   Pulmonary:      Effort: Pulmonary effort is normal.      Breath sounds: Normal breath sounds.   Skin:     General: Skin is warm and dry.   Neurological:      Mental Status: She is alert and oriented to person, place, and time.      Motor: Weakness present.      Comments: She has right-sided extremity weakness, and dysarthria/aphasia.        Lab Results   Component Value Date    WBC 5.12 07/10/2023    HGB 15.0 07/10/2023    HCT 45.0 07/10/2023     07/10/2023    CHOL 178 06/26/2023    TRIG 93 06/26/2023    HDL 71 06/26/2023    LDLCALC 88 06/26/2023    ALT 9 07/02/2023    AST 22 07/02/2023     07/10/2023    K 4.2 07/10/2023    CREATININE 0.8 07/10/2023    BUN 11 07/10/2023    TSH 2.94 04/27/2022    INR 1.1 06/28/2023       Cardiac Imaging    TRANSTHORACIC ECHO (TTE) COMPLETE 02/09/2022  · Left Ventricle: Normal ventricle size. Mild concentric left ventricular hypertrophy. Mild-to-moderately decreased systolic function. Estimated EF 40- 45%. LV SVI low at 28 mL/m² per beat. Basal and mid inferolateral akinesis, mid and apical anteroseptal akinesisGrade I diastolic dysfunction. Diastolic inflow pattern  consistent with impaired relaxation. Normal left atrial pressure.  · Tricuspid aortic valve. Sclerotic aortic valve leaflets.  · Aorta: Sinuses of Valsalva normal-sized.  · Normal leaflet structure of the mitral valve.  · Normal-sized LA. Doppler flow of pulmonary veins not obtained.  · Normal-sized RV. Mildly reduced RV systolic function. Pacemaker/ICD lead present in the RV. Normal RV wall thickness. Mid free wall akinetic.  · Normal-sized RA. Pacemaker wire present in the RA.  · Tricuspid Valve: Normal structure. Trace regurgitation. Estimated RVSP = 27 mmHg.  · Pulmonic valve not well visualized.  · IVC collapses <50% during inspiration.  · Evidence of a prominent pericardial fat pad.  Limited study.  Tissue velocities not obtained.  Apical 2 chamber view not available.  Patient declined Definity.    Echo 6/27/2023   Left ventricle with normal dimensions and regional contraction abnormalities consistent with CAD in the LAD distribution: Localized apical infarction with aneurysm formation.  Paradoxical septal movement is consistent with ventricular pacing.  There is moderate left ventricular systolic and mild diastolic dysfunction.  LVEF 43%   Left ventricular apical thrombus   Left atrial pressure 14 mmHg   There is a visual suggestion of mild aortic stenosis. (Doppler assessment of the aortic valve is suboptimal)   Mild mitral regurgitation   Normal right ventricle dimensions and systolic function: TAPSE 1.84 cm   Mild tricuspid regurgitation   PASP 34 mmHg   Normal left atrial volume index   No pericardial effusion or vegetations   AS-V pacing   Technically difficult study: No parasternal imaging obtained   This study was performed with Definity contrast to optimize evaluation of regional and global left ventricular function   No significant change from 3/28/2023 except for current demonstration of left ventricular apical thrombus          ECG sinus rhythm with ventricular pacing.     Cardiac  pacemaker  Medtronic [place] XT DR MRI [id number] implanted on 11/18/2019.  Battery longevity estimated at 8.6 years until RRT.  Mode is DDD .  There is atrial pacing 44% of the time and V pacing 99.7% of the time there were no events on interrogation.  Atrial lead has a measured P wave of 3.1 mV, pacing threshold of 0.5 V at 0.4 ms, and a lead impedance of 627 ohms.  The RV lead is measured R wave of 9.6 mV, pacing threshold of 1.0 V 0.4 ms, and a lead impedance of 437 ohms.    Her device is MRI compatible.  If necessary, she can undergo an MRI.  There were no changes made to the permanent programming of her device.    Ischemic cardiomyopathy  Her most recent echocardiogram demonstrated an EF of 40 to 45%; with apical aneurysm small thrombus.  She is euvolemic on exam today. She follows closely with Dr. Salter.     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    Coronary artery disease involving native coronary artery of native heart with angina pectoris (CMS/HCC)  She has a history of coronary artery disease with non-STEMI in April 2019 and is status post drug-eluting stent x2 to the LAD, drug-eluting stent x4 to the RCA, drug-eluting stent x1 to the left circumflex.  She is maintained on aspirin and statin therapy.  Plavix had to be stopped in the setting of severe nosebleeds.  She has no symptoms of angina.     Acute ischemic left MCA stroke (CMS/HCC)  She presented to [facility] ED on 6/2 where she was found to be confused and aphasic with right sided weakness. CT revealed acute infarct in left MCA. Echo showed an EF of 43% and localized apical aneurysm containing thrombus. She was started on Eliquis 5mg BID.     With discussion of her friend, it appears her symptoms have improved remarkably over the past couple months.  Though she remains frustrated, she was encouraged to continue with her rehab and therapy.         Jermaine Carolina MD  09/15/2023

## 2023-09-16 ASSESSMENT — ENCOUNTER SYMPTOMS
WEAKNESS: 1
SPEECH DIFFICULTY: 1

## 2023-09-18 ENCOUNTER — APPOINTMENT (EMERGENCY)
Dept: RADIOLOGY | Facility: HOSPITAL | Age: 78
End: 2023-09-18
Payer: MEDICARE

## 2023-09-18 ENCOUNTER — HOSPITAL ENCOUNTER (EMERGENCY)
Facility: HOSPITAL | Age: 78
Discharge: HOME | End: 2023-09-18
Attending: EMERGENCY MEDICINE | Admitting: EMERGENCY MEDICINE
Payer: MEDICARE

## 2023-09-18 VITALS — DIASTOLIC BLOOD PRESSURE: 65 MMHG | HEART RATE: 70 BPM | SYSTOLIC BLOOD PRESSURE: 143 MMHG | OXYGEN SATURATION: 96 %

## 2023-09-18 DIAGNOSIS — M79.89 SWELLING OF RIGHT HAND: Primary | ICD-10-CM

## 2023-09-18 PROCEDURE — 73130 X-RAY EXAM OF HAND: CPT | Mod: RT

## 2023-09-18 PROCEDURE — 99283 EMERGENCY DEPT VISIT LOW MDM: CPT

## 2023-09-18 NOTE — PROGRESS NOTES
Speech-Language Pathology Progress Note      SLP PROGRESS NOTE FOR OUTPATIENT THERAPY    Patient: Jennifer Sams MRN: [id number]  : 1945 78 y.o.  Referring Physician: Liv Haywood, Debra  Date of Visit: 2023      Certification Dates: 23 through 23    Recommended Frequency & Duration:  2 times/week for up to 3 months   PN: 2023    Diagnosis:   1. Cerebrovascular accident (CVA), unspecified mechanism (CMS/HCC)        Chief Complaints:  Chief Complaint   Patient presents with    Speech Problem     apraxia       Precautions:  Aphasia, acceptance       TODAY'S VISIT:    Session Time:  Start Time: 1715  Stop Time: 1800  Time Calculation (min): 45 min   General Information - 23        Session Details    Document Type progress note     Mode of Treatment individual therapy        General Information    Onset of Illness/Injury or Date of Surgery 23     Referring Physician Dr. Haywood     Patient/Family/Caregiver Comments/Observations Caregiver reported saying longer sentences                Daily Falls Screen - 23 1723        Daily Falls Assessment    Patient reported fall since last visit No                Pain and Vitals - 23        Pain Assessment    Currently in pain Yes     Preferred Pain Scale FACES (Field-Ware FACES Pain Rating Scale)     Pain Side/Orientation right     Pain: Body location Leg;Arm;Toe   on entire side of right side of body    Word Pain Rating: Activity 4 - moderate pain     Pain Rating (word): Post Activity 4 - moderate pain     FACES Pain Rating: Rest 4-->hurts little more        Pain Interventions    Intervention Will see doctor tomorrow.     Post Intervention Comments none                SLP - 23 172        Speech Therapy    Speech Therapy Specialty Traditional Neuro Program ST        SLP Frequency and Duration    Frequency of treatment 2 times/week     Speech Duration 3 months     SLP Custom Frequency and Duration  PN: 9/16/2023     SLP Cert From 08/16/23     SLP Cert To 11/14/23     Date SLP POC was sent to provider 08/16/23     Signed SLP Plan of Care received?  Yes                Assessment - 09/14/23 4877        Assessment    Plan of Care reviewed and patient/family in agreement Yes     Clinical Assessment Ms. Sams has attended speech therapy for 4 visits, accompanied by her POAThelma, who reports practice over the week of target practice items following weekly sessions. Ms. Sams is increasing rate of accuracy with bilabial phonemes in isolation, intial position of single and repeated syllables (CV, CVCV).  Bilabial carryover in functional words and phrases practiced daily is emerging.  Ms Sams's auditory comprehension and decreased insight to benefit of therapy impedes progress and will continue to be monitored and stimulated at tolerated levels.  Ms. Sams would benefit from continued motor speech therapy and aphasia therapy at a frequency of 2 times a week or as tolerated.     Plan and Recommendations auditory bombardment of bilabials, bilabial proprioception.  Blend vowels as able, Functional words.  Automatics- 1-10     Planned Services [facility] [id number] Speech Lang Voice Comm and/or Auditory Proc (Treatment of speech, language, voice, communication and/or hearing processing disorder)                 OBJECTIVE MEASUREMENTS/DATA:    None Taken    Outcome Measures        8/16/2023    17:42 8/25/2023    16:57   SLP Outcome Measures   FCM: Motor Speech 2-->Level 2       Goal L4    FCM: Reading 3-->Level 3       Goal: maintain 3, indirect therapy, improve to 4.    FCM: Spoken Language, Comprehension --        TBD, suspect L2, goal, indirect therapy-4 2-->Level 2   FCM: Spoken Language, Expression 2-->Level 2        Today's Treatment::     Education provided:  Yes: See treatment log for details of education provided  Methods: Discussion, Handout and Demonstration  Readiness: POA demonstrates understanding  Response: POA  demonstrates understanding.  Pt's language deficits impede awareness and understanding.     MOTOR/PRODUCTION SLP FLOW SHEET    SKILL AREA CURRENT SESSION   SPEECH PRODUCTION (MOTOR)  CPT 14881    Pt will produce bilabials /p, b, m, w/, tip alveolars /t, d, n/ and tense vowels /a, e, I, o,u/ in isolation provided mod A, 80% of trials.  9/6/2023  I can't, wait, are you kidding me?, please, bye  /b/: isolation- with tactile cues  /m/:isolation- with tactile cues  /w/: isolation- with tactile cues.   /p/: isolation following auditory bombardment.   /n/: isolation: with placement cues.  Vowels: 100% with decreased rate and DM. Rapid 80%, 60%    8/25/2023  p- unable  b max A,   m- mod A, frequently substitutes /n/   w- Min A,  /a, e, I, o,u/- min to mod A   Pt will produce VC and CV syllables provided the above phonemes, Mod A, 80% of trials 9/6/2023  /w/ + vowels    8/25/2023  /w/ + vowels: Mod to max A   Pt will produce single and bisyllablic words consisting of CVCV, VCV and CVC, Mod A, 80% of presentations.    Pt will produce simple automatics provided mod A:  1-10  A-G  Yolis and identifiable syllable combinations for MALLY 9/6/2023  Approximated for 70% of 1-10   Pt will produce small set of functional words/phrases in unison or in imitation with SLP, then produce 3  successive attempts with 80% accuracy  9/6/2023  Produced 8/14 functional words producing all phonemes.     8/25/2023  Established list of functional words, names. See below.  Pt stimulable for 5/16.  Approximations for remainder.   Noted with difficulty with velars, blends in particular.     Auditory Comprehension and reading to be further assessed for determination of goals to support speech production    GOAL: 8/25/2023  Given direct model, repetition and NV reinforcement, Pt will follow 1-step commands for motor speech production up to 1-2 syllable words, 80% of presentations.  8/25/2023  Auditory comp,severe impairment:  Aphasia Severity Rating:  "1/5-   All Communication is through fragmentary expression; great need for inference, questioning, and guessing by the listener.  The range of information that can be exchanged is limited, and the listener carries the burden of communication.    Provided pictures/objects, and/or body parts, pt will demonstrate  understanding of \"point to___\" and \" ___\" 85% of trials.  9/6/2023  Requires: direct model, repetition NV feedback to place tongue depressor in position for labial proprioception.    Education/Strategy Training    Other 8/25/2023  POA reports suspect decline in cognition recently.  Encouraged to report to MD and encourage improving sleep and nutrition routines.      Functional words and names: 8.25.2023  Dick Montes  Annia  Water    I'm thirsty  I'm fine  Arm hurts  Legs hurts  Call me  I'm tired  Feed Loan.      BDAE  Eval: 8/18/2023      Aphasia Severity Rating Scale 1/5    CONVERSATIONAL and EXPOSITORY SPEECH       Simple Social Responses Raw Score:   Percentiles:    Complexity Index  Raw Score:    Ratio:   Percentiles:     Narrative Discourse  Short form Raw Score:   Ratio:   Percentiles:     AUDITORY COMPREHEN     Basic Word Discrimination    Commands  Standard Form  8/25/2023     Complex Ideational Material  Short Form  8/25/2023             Raw Score: 29/37  Percentiles:20%     Raw Score: 3/15  Percentiles: 0-10%       Raw Score: 0/4  Percentiles: 0%                            ARTICULATION  Agility              ORAL EXPRESSION  Recitation, Yolis, Rhythm              Automatized Sequences  Standard Form   Recitation: 0  Percentiles: 30%tile  Yolis: 1  Percentiles: 30%tile  Rhythm: 1  Percentiles: 60%tile     Raw Score: 0/8  Percentiles: 0%tile       REPETITION  Words  Standard Form     Repetition of Nonsense words     Sentences  Standard Form    Raw Score: 0  Percentiles:0 %tile     Raw Score:  Percentiles:      Raw " Score:  Percentiles:                          NAMING  Responsive Naming  Standard Form     [place] Naming Test  Standard Form    Raw Score:   Percentiles:      Raw Score:   Stim cue:   PC:   MC:   Percentiles:   Mean:                 READING  Match case and scripts    Number matching    Picture-Word Matching    Oral Word Reading    Oral Sentence Reading    Oral Sentence Comp    Sentence/Paragraph    Comprehension   Raw Score: 7  Percentiles: 40%tile  Raw Score:   Percentiles:   Raw Score: 10  Percentiles: 100%tile  Raw Score:   Percentiles:   Raw Score:   Percentiles:   Raw Score:   Percentiles:   Raw Score:   Percentiles:   Raw Score:   Percentiles:      WRITING  Form    Letter Choice    Motor Facility    Primer Words    Regular Phonics    Common Irregular Words    Written Picture Naming    Narrative Writing    Raw Score:   Percentiles:   Raw Score:   Percentiles:   Raw Score:   Percentiles:   Raw Score:   Percentiles:   Raw Score:   Percentiles:   Raw Score:   Percentiles:   Raw Score:   Percentiles:   Raw Score:   Percentiles:           IE: 2023   Rating Scale Profile of Speech Characteristics 1-7 scale   Articulatory Agility-phoneme and syllable level 1/7   Phrase Length: longest word runs 2/7   Grammatical Form: Variety and use of morphemes 2/7   Melodic Line (Prosody) 1/7   Paraphasia in Running Speech 1/7    Word Finding Relative to Fluency 1/7   Sentence Repetition 1/7   Auditory Comprehension 1/7      Aphasia Severity Ratin/5-   All Communication is through fragmentary expression; great need for inference, questioning, and guessing by the listener.  The range of information that can be exchanged is limited, and the listener carries the burden of communication.             Goals Addressed                 This Visit's Progress     SLP Motor Goals           Time Frame  Result  Comment/Progress    Will improve FCM in the following:  MOTOR SPEECH  Current:  Goal:   LTG- 12 w     Demonstrate effective  "speech intelligibility to communicate at single word and functional phrase level with (min A) use of compensatory strategies with familiar listeners  LTG- 12 w     Pt will produce bilabials /p, b, m, w/, tip alveolars /t, d, n/ and tense vowels /a, e, I, o,u/ in isolation provided mod A, 80% of trials.  STG-4w   continue  PN: 9/14/2023  /b,m,w/ min cues  /p,n/ Mod cues  /t,d/ unable  Vowels(tense): min A   Pt will produce VC and CV syllables provided the above phonemes, Mod A, 80% of trials STG- 6-8 w   continue  PN: 9/14/2023  /w/+tense vowels-DM, Min A   Pt will produce single and bisyllablic words consisting of CVCV, VCV and CVC, Mod A, 80% of presentations.  STG 8-10   continue  PN: 9/14/2023  CVCV repeated, DM, Min A   Pt will produce simple automatics provided mod A:  1-10  A-G  Yolis and identifiable syllable combinations for MALLY  STG- 8-10  continue  PN: 9/14/2023  1-10, 70% mod A   Pt will produce small set of functional words/phrases in unison or in imitation with SLP, then produce 3  successive attempts with 80% accuracy  STG 8-10   continue  PN: 9/14/2023  57% provided DM   Auditory Comprehension and reading to be further assessed for determination of goals to support speech production    GOAL: 8/25/2023  Given direct model, repetition and NV reinforcement, Pt will follow 1-step commands for motor speech production up to 1-2 syllable words, 80% of presentations.                 4-6 w  MET     OAL: 8/25/2023  Provided pictures/objects, and/or body parts, pt will demonstrate  understanding of \"point to___\" and \" ___\" 85% of trials.   8 w         FCM: MOTOR SPEECH:  L3:  The communication partner must assume primary responsibility for interpreting the communication exchange, however the individual is able to produce short consonant-vowel combinations or automatic words intelligibly. With consistent MODERATE cueing, the individual can produce simple words and phrases intelligibly, although accuracy " may vary.       FCM: Spoken Language Comprehension  L2: With consistent, maximal cues, the individual is able to follow simple directions, respond to simple yes/no, questions in context, and respond to simple words or phrases related to personal needs.

## 2023-09-18 NOTE — ED ATTESTATION NOTE
The patient was evaluated and managed by the PA/NP/resident.  I have discussed the case with the PA/NP/resident and I have reviewed the patients x-ray imaging.  I am in agreement with the ED workup and treatment plan.  I will continue to be available for consultation as needed.     Godshall, Duane K, MD  09/18/23 3055

## 2023-09-18 NOTE — ED PROVIDER NOTES
Emergency Medicine Note  HPI   HISTORY OF PRESENT ILLNESS     78-year-old female with history of arthritis, Raynaud's, GERD, MI, AAA, CVA, hypercholesterolemia, CAD, presents the emergency department for swelling of the fingers on her right hand.  Patient has issues with aphasia, friend/POA is at bedside, assisting with the history.  Patient reportedly banged her right hand yesterday and noticed some swelling in her fingers today.  She came in to make sure that nothing was broken.  Patient denies any pain, but would not feel pain since having the stroke.  Patient does have a trigger finger of her right fifth finger at baseline.            Patient History   PAST HISTORY     Reviewed from Nursing Triage:       Past Medical History:   Diagnosis Date    AAA (abdominal aortic aneurysm) (CMS/[facility])     Arthritis     Elevated blood pressure reading without diagnosis of hypertension 04/19/2019    Epistaxis 01/06/2020    GERD (gastroesophageal reflux disease) 04/19/2019    Glaucoma 04/19/2019    Heart murmur     Hiatal hernia     History of hip replacement, total, right 04/19/2019    Right hip 9/ 2016 and revision 2017     History of rheumatic fever as a child 04/19/2019    Hypercholesterolemia 04/19/2019    Ischemic cardiomyopathy 05/09/2019    Kidney cysts     MI (myocardial infarction) (CMS/[facility])     Osteoarthritis of multiple joints 04/19/2019    Osteoporosis     Pacemaker 12/09/2019    Raynaud's disease 04/19/2019    RSD (reflex sympathetic dystrophy) 04/19/2019    Of left foot    SOB (shortness of breath) 12/10/2019    Status post insertion of drug eluting coronary artery stent 05/09/2019    Stroke (CMS/[facility])        Past Surgical History:   Procedure Laterality Date    CHOLECYSTECTOMY OPEN      CORONARY ANGIOPLASTY WITH STENT PLACEMENT  04/29/2019    of LAD    CORONARY ANGIOPLASTY WITH STENT PLACEMENT  04/30/2019    of RCA    DILATION AND CURETTAGE OF UTERUS      EYE SURGERY      RIGHT CATARACT     FOOT SURGERY Left     JOINT REPLACEMENT Right 09/2016    total hip    REVISION TOTAL HIP ARTHROPLASTY Right 2017    TONSILLECTOMY         Family History   Problem Relation Age of Onset    Congenital heart disease Biological Mother         noted at autopsy    Pulmonary fibrosis Biological Father     Heart attack Paternal Grandfather        Social History     Tobacco Use    Smoking status: Never    Smokeless tobacco: Never   Vaping Use    Vaping Use: Never used   Substance Use Topics    Alcohol use: Not Currently    Drug use: No         Review of Systems   REVIEW OF SYSTEMS     Review of Systems   Skin:        Swelling of fingers of right hand         VITALS     ED Vitals    Date/Time Temp Pulse Resp BP SpO2 [facility]   09/18/23 1652 -- 70 -- 143/65 96 % HC                       Physical Exam   PHYSICAL EXAM     Physical Exam  Vitals reviewed.   Constitutional:       General: She is not in acute distress.     Appearance: She is not ill-appearing, toxic-appearing or diaphoretic.   HENT:      Head: Normocephalic and atraumatic.   Pulmonary:      Effort: Pulmonary effort is normal. No respiratory distress.   Musculoskeletal:      Right hand: Swelling (Mild, first through third fingers) present. No deformity or lacerations. Normal range of motion. Decreased sensation (At baseline). Normal capillary refill. Normal pulse.      Comments: Full active range of motion of all fingers of the right hand  NV intact  Cap refill <2     Skin:     General: Skin is warm and dry.   Neurological:      Mental Status: She is alert.           PROCEDURES     Procedures     DATA     Results     None          Imaging Results          X-RAY HAND RIGHT 3+ VIEWS (In process)  Result time 09/18/23 18:41:27                No orders to display       Scoring tools                                  ED Course & MDM   MDM / ED COURSE / CLINICAL IMPRESSION / DISPO     Medical Decision Making  Swelling of right hand: acute illness or injury  Amount  and/or Complexity of Data Reviewed  Radiology: ordered and independent interpretation performed.          ED Course as of 09/18/23 1929   Mon Sep 18, 2023   1929 Patient seen for concern for fracture in the right hand.  X-ray negative.  Will discharge patient home.  Case d/w Dr Cha [MG]      ED Course User Index  [MG] Arely Amador PA C     Clinical Impression      None               Arely Amador PA C  09/18/23 1931

## 2023-09-21 ENCOUNTER — HOSPITAL ENCOUNTER (OUTPATIENT)
Dept: SPEECH THERAPY | Age: 78
Setting detail: THERAPIES SERIES
Discharge: HOME | End: 2023-09-21
Attending: PHYSICAL MEDICINE & REHABILITATION
Payer: MEDICARE

## 2023-09-21 ENCOUNTER — HOSPITAL ENCOUNTER (OUTPATIENT)
Dept: OCCUPATIONAL THERAPY | Age: 78
Setting detail: THERAPIES SERIES
Discharge: HOME | End: 2023-09-21
Attending: PHYSICAL MEDICINE & REHABILITATION
Payer: MEDICARE

## 2023-09-21 DIAGNOSIS — I63.9 CEREBROVASCULAR ACCIDENT (CVA), UNSPECIFIED MECHANISM (CMS/HCC): Primary | ICD-10-CM

## 2023-09-21 DIAGNOSIS — I63.512 ACUTE ISCHEMIC LEFT MCA STROKE (CMS/HCC): Primary | ICD-10-CM

## 2023-09-21 DIAGNOSIS — M62.81 MUSCLE WEAKNESS (GENERALIZED): ICD-10-CM

## 2023-09-21 DIAGNOSIS — R27.9 LACK OF COORDINATION: ICD-10-CM

## 2023-09-21 DIAGNOSIS — I63.512 ACUTE ISCHEMIC LEFT MCA STROKE (CMS/HCC): ICD-10-CM

## 2023-09-21 DIAGNOSIS — G81.90: ICD-10-CM

## 2023-09-21 PROCEDURE — 92507 TX SP LANG VOICE COMM INDIV: CPT | Mod: GN

## 2023-09-21 PROCEDURE — 97112 NEUROMUSCULAR REEDUCATION: CPT | Mod: GO,59

## 2023-09-21 PROCEDURE — 97530 THERAPEUTIC ACTIVITIES: CPT | Mod: GO,59

## 2023-09-21 NOTE — OP SLP TREATMENT LOG
MOTOR/PRODUCTION SLP FLOW SHEET    SKILL AREA CURRENT SESSION   SPEECH PRODUCTION (MOTOR)  CPT 66211    Pt will produce bilabials /p, b, m, w/, tip alveolars /t, d, n/ and tense vowels /a, e, I, o,u/ in isolation provided mod A, 80% of trials.  9/21/2023  /p/+++-+ +++++  /b/ ++--- ----+  /m/ +++++ +++++  /w/ ++--+ +++++    9/6/2023  I can't, wait, are you kidding me?, please, bye  /b/: isolation- with tactile cues  /m/:isolation- with tactile cues  /w/: isolation- with tactile cues.   /p/: isolation following auditory bombardment.   /n/: isolation: with placement cues.  Vowels: 100% with decreased rate and DM. Rapid 80%, 60%    8/25/2023  p- unable  b max A,   m- mod A, frequently substitutes /n/   w- Min A,  /a, e, I, o,u/- min to mod A   Pt will produce VC and CV syllables provided the above phonemes, Mod A, 80% of trials 9/21/023  Mat/map, mop/mop, poop/poop, beam/beam, boom, peep, pa/pom    9/6/2023  /w/ + vowels    8/25/2023  /w/ + vowels: Mod to max A   Pt will produce single and bisyllablic words consisting of CVCV, VCV and CVC, Mod A, 80% of presentations. 9/21/2023  CVCV  puppy   Pt will produce simple automatics provided mod A:  1-10  A-G  Yolis and identifiable syllable combinations for MALLY 9/21/2023  1-11, missed 5&6  1-10, missed 5&8  A-D,   A-E    9/6/2023  Approximated for 70% of 1-10   Pt will produce small set of functional words/phrases in unison or in imitation with SLP, then produce 3  successive attempts with 80% accuracy  9/6/2023  Produced 8/14 functional words producing all phonemes.     8/25/2023  Established list of functional words, names. See below.  Pt stimulable for 5/16.  Approximations for remainder.   Noted with difficulty with velars, blends in particular.     Auditory Comprehension and reading to be further assessed for determination of goals to support speech production    GOAL: 8/25/2023  Given direct model, repetition and NV reinforcement, Pt will follow 1-step commands for  "motor speech production up to 1-2 syllable words, 80% of presentations.  2023  Auditory comp,severe impairment:  Aphasia Severity Ratin/5-   All Communication is through fragmentary expression; great need for inference, questioning, and guessing by the listener.  The range of information that can be exchanged is limited, and the listener carries the burden of communication.    Provided pictures/objects, and/or body parts, pt will demonstrate  understanding of \"point to___\" and \" ___\" 85% of trials.  2023  Requires: direct model, repetition NV feedback to place tongue depressor in position for labial proprioception.    Education/Strategy Training  ++----++-+++++    h   entire, part of rest.     Other 2023  Pt was able to participate in conversation about Thelma's horses and pt was compliant with practicing target syllables with target phonemes throughout conversation and added to functional word list.  2023  POA reports suspect decline in cognition recently.  Encouraged to report to MD and encourage improving sleep and nutrition routines.      Functional words and names: 2023  Dick Milner  Water    I'm thirsty  I'm fine  Arm hurts  Legs hurts  Call me  I'm tired  Feed Loan.      BDAE  Eval: 2023      Aphasia Severity Rating Scale 1/5    CONVERSATIONAL and EXPOSITORY SPEECH       Simple Social Responses Raw Score:   Percentiles:    Complexity Index  Raw Score:    Ratio:   Percentiles:     Narrative Discourse  Short form Raw Score:   Ratio:   Percentiles:     AUDITORY COMPREHEN     Basic Word Discrimination    Commands  Standard Form  2023     Complex Ideational Material  Short Form  2023             Raw Score: 29/37  Percentiles:20%     Raw Score: 3/15  Percentiles: 0-10%       Raw Score: 0/4  Percentiles: 0%                            ARTICULATION  Agility              ORAL " EXPRESSION  Recitation, Yolis, Rhythm              Automatized Sequences  Standard Form   Recitation: 0  Percentiles: 30%tile  Yolis: 1  Percentiles: 30%tile  Rhythm: 1  Percentiles: 60%tile     Raw Score: 0/8  Percentiles: 0%tile       REPETITION  Words  Standard Form     Repetition of Nonsense words     Sentences  Standard Form    Raw Score: 0  Percentiles:0 %tile     Raw Score:  Percentiles:      Raw Score:  Percentiles:                          NAMING  Responsive Naming  Standard Form     Purling Naming Test  Standard Form    Raw Score:   Percentiles:      Raw Score:   Stim cue:   PC:   MC:   Percentiles:   Mean:                 READING  Match case and scripts    Number matching    Picture-Word Matching    Oral Word Reading    Oral Sentence Reading    Oral Sentence Comp    Sentence/Paragraph    Comprehension   Raw Score: 7  Percentiles: 40%tile  Raw Score:   Percentiles:   Raw Score: 10  Percentiles: 100%tile  Raw Score:   Percentiles:   Raw Score:   Percentiles:   Raw Score:   Percentiles:   Raw Score:   Percentiles:   Raw Score:   Percentiles:      WRITING  Form    Letter Choice    Motor Facility    Primer Words    Regular Phonics    Common Irregular Words    Written Picture Naming    Narrative Writing    Raw Score:   Percentiles:   Raw Score:   Percentiles:   Raw Score:   Percentiles:   Raw Score:   Percentiles:   Raw Score:   Percentiles:   Raw Score:   Percentiles:   Raw Score:   Percentiles:   Raw Score:   Percentiles:           IE: 2023   Rating Scale Profile of Speech Characteristics 1-7 scale   Articulatory Agility-phoneme and syllable level 1/   Phrase Length: longest word runs 2/7   Grammatical Form: Variety and use of morphemes 2/7   Melodic Line (Prosody)    Paraphasia in Running Speech 7    Word Finding Relative to Fluency 1/7   Sentence Repetition 1/7   Auditory Comprehension       Aphasia Severity Ratin/5-   All Communication is through fragmentary expression; great need for  inference, questioning, and guessing by the listener.  The range of information that can be exchanged is limited, and the listener carries the burden of communication.

## 2023-09-21 NOTE — PROGRESS NOTES
Occupational Therapy Visit    OT DAILY NOTE FOR OUTPATIENT THERAPY    Patient: Jennifer Sams   MRN: [id number]  : 1945 78 y.o.  Referring Physician: Liv Haywood, Debra  Date of Visit: 2023      Certification Dates: 23 through 23    Diagnosis:   1. Acute ischemic left MCA stroke (CMS/HCC)    2. Muscle weakness (generalized)    3. Lack of coordination    4. Hemiplegia, dominant side (CMS/HCC)        Chief Complaints:   Brain Injury    Precautions:       TODAY'S VISIT    Time In Session:  Start Time: 1501  Stop Time: 1600  Time Calculation (min): 59 min   History/Vitals/Pain/Encounter Info - 23 1503        Injury History/Precautions/Daily Required Info    Document Type daily treatment     Chief Complaint/Reason for Visit  Brain Injury     Onset of Illness/Injury or Date of Surgery 23     Referring Physician Dr. Haywood     History of present illness/functional impairment The patient is a 78-year-old  female with a history of AAA, heart block s/p cardiac pacemaker, glaucoma, hyperlipidemia, coronary artery disease, NSTEMI, who presented to [facility] on  after she has not been seen by her friends for a few days.  Patient lives alone and when she was found by EMS she was confused and combative.  In the emergency department she was aphasic with right-sided weakness and hypertensive to the 190 systolic.  CT scan of the head revealed an acute infarct in left MCA with mild bleeding.  Echo showed an EF of 43% and a bubble study was negative.  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  Later this was transitioned to apixaban.  She was continued on statin for secondary prevention.  She required virtual shivani while in the hospital but this was not effective.  She was evaluated by speech therapy and cleared for a soft and bite-size diet with moderately thick liquids.'      "Patient/Family/Caregiver Comments/Observations Patient arrives with JONE Renee. Patient went to ED due to hitting right hand and having increased pain. Xray negative.     Patient reported fall since last visit No        Pain Assessment    Currently in pain Other (see comments)   Unable to rate due to aphasia. Touches RUE and says \"hurt and cold\"        Services    Do You Speak a Language Other Than English at Home? no                Daily Treatment Assessment and Plan - 09/21/23 1623        Daily Treatment Assessment and Plan    Progress toward goals Progressing     Daily Outcome Summary Session focused on right hand edema management and sensory re-education. Patient with excellent tolerance for deep pressure to right hand today compared to previous sessions. Sensory modalities tried: deep pressure, compression garment, light touch, deep pressure, rough texture and vibration. Patient unable to feel all modalities.     Plan and Recommendations Incorporate rehabilitative and compensatory strategies for RUE sensory and motor recovery                     OBJECTIVE DATA TAKEN TODAY    Outcome Measures    Outcome Measures - 09/21/23 1629        Other Outcome Measures Used/Comments    Outcome measure used: Right hand edema     Outcome Measures General Comments Thumb 1st metacarpal: 6 cm; IF metacarpal: 5.5 cm; MF MC: 5.5 cm; RF: 5 cm; SM MC: 4.4 cm                 Today's Treatment:      OT NEURO FLOW SHEET    EXERCISES  Initial Evaluation  Progress Note 1 CURRENT SESSION  8/16/2023 9/14/2023 TIME   NEURO RE-ED  CPT 36634 TOTAL TIME FOR SESSION 38-52 Minutes   AAROM/AROM     Strengthening      Strength RUE SM trigger finger beginning to appear- tolerated manual massage x 3 minutes well.     Gross Motor Control RUE gross grasp of small vibration device for continuous sensory input: performed functional reaching through punch pattern x 30 repetitions. 1 drop.     Fine Motor Control Performed towel squeezes x 20 " with maximal assist for extension. Performed gross grasp and circular movements using 2 kg medicine ball with moderate assistance. Sustained grasp on small vibration tool x 40 seconds x 3 trials.     Sitting Jennifer/Balance     Standing Jennifer/Balance     Sensory re-education Soft tissue massage with deep pressure to all areas distal elbow RUE x 10 minutes. Patient tolerated deep pressure extremely well compared to previous trials. Applied deep pressure with rough texture with towel. Vibration applied throughout RUE. Patient unable to feel all modalities trialed.     Edema management to right hand with gentle retrograde massage to digits and palm.      Retraining     THERE ACT  CPT 26297 TOTAL TIME FOR SESSION 8-22 Minutes   Pain, Vitals, Meds, Etc. Pain monitored    Assessment Measured right hand digit circumferential size for baseline measure following edema management.     HEP Right hand stretching    9/21/2023:   -Educated on use of rice/bean sensory bin, massage, sensory re-education with deep pressure and textures. Issued tubigrip for RUE sensory re-education and advised to wear 1x per day. Continued to discuss edema management.     Functional Mobility     Transfers     THERE EX  CPT 43332 TOTAL TIME FOR SESSION 0-7 Minutes   PROM Educated on right hand stretching due to flexor tightness with prayer pose.     Activity Tolerance     SELF-CARE  CPT 26981 TOTAL TIME FOR SESSION Not performed   Pt Education/Safety     ADL Training     IADL Training     MODALITIES  CPT 11235 TOTAL TIME FOR SESSION Not performed   Ice/Heat     ATTENDED E-STIM  CPT 79771 TOTAL TIME FOR SESSION Not performed   Attended E-Stim     SPLINTING  CPT 03572 TOTAL TIME FOR SESSION Not performed   Fabrication/Check Out     Fit     Training     GROUP  CPT 54371 TOTAL TIME FOR SESSION Not performed             Normative Range for Female 75+ Years of Age       Right Hand   Left Hand      Initial Evaluation Progress Note 1 Norm Initial Evaluation  Progress Note 1 Norm    Strength 0 lbs 0 lbs 42.6 lbs 30 lbs 30 lbs 37.6 lbs   Lateral Pinch N/T 2 lbs 12.6 lbs N/T 14 lbs 11.4 lbs   Three Jaw Yonatan N/T 2 lbs (assist for position) 12 lbs N/T 11 lbs 11.5 lbs   Tip Pinch N/T N/T 9.6 lbs N/T 9 lbs 9.3 lbs   Box and Block 4 blocks 22 blocks 65 blocks 50 blocks 56 blocks 63.6 blocks   Nine Hole Peg (71+ age) Unable to complete Unable to complete 22.49 seconds N/T 24.55 seconds 24.11 seconds

## 2023-09-21 NOTE — OP OT TREATMENT LOG
OT NEURO FLOW SHEET    EXERCISES  Initial Evaluation  Progress Note 1 CURRENT SESSION  8/16/2023 9/14/2023 TIME   NEURO RE-ED  CPT 22061 TOTAL TIME FOR SESSION 38-52 Minutes   AAROM/AROM     Strengthening      Strength RUE SM trigger finger beginning to appear- tolerated manual massage x 3 minutes well.     Gross Motor Control RUE gross grasp of small vibration device for continuous sensory input: performed functional reaching through punch pattern x 30 repetitions. 1 drop.     Fine Motor Control Performed towel squeezes x 20 with maximal assist for extension. Performed gross grasp and circular movements using 2 kg medicine ball with moderate assistance. Sustained grasp on small vibration tool x 40 seconds x 3 trials.     Sitting Jennifer/Balance     Standing Jennifer/Balance     Sensory re-education Soft tissue massage with deep pressure to all areas distal elbow RUE x 10 minutes. Patient tolerated deep pressure extremely well compared to previous trials. Applied deep pressure with rough texture with towel. Vibration applied throughout RUE. Patient unable to feel all modalities trialed.     Edema management to right hand with gentle retrograde massage to digits and palm.      Retraining     THERE ACT  CPT 01318 TOTAL TIME FOR SESSION 8-22 Minutes   Pain, Vitals, Meds, Etc. Pain monitored    Assessment Measured right hand digit circumferential size for baseline measure following edema management.     HEP Right hand stretching    9/21/2023:   -Educated on use of rice/bean sensory bin, massage, sensory re-education with deep pressure and textures. Issued tubigrip for RUE sensory re-education and advised to wear 1x per day. Continued to discuss edema management.     Functional Mobility     Transfers     THERE EX  CPT 24934 TOTAL TIME FOR SESSION 0-7 Minutes   PROM Educated on right hand stretching due to flexor tightness with prayer pose.     Activity Tolerance     SELF-CARE  CPT 33202 TOTAL TIME FOR SESSION Not  performed   Pt Education/Safety     ADL Training     IADL Training     MODALITIES  CPT 45164 TOTAL TIME FOR SESSION Not performed   Ice/Heat     ATTENDED E-STIM  CPT 23306 TOTAL TIME FOR SESSION Not performed   Attended E-Stim     SPLINTING  CPT 97829 TOTAL TIME FOR SESSION Not performed   Fabrication/Check Out     Fit     Training     GROUP  CPT 87684 TOTAL TIME FOR SESSION Not performed             Normative Range for Female 75+ Years of Age       Right Hand   Left Hand      Initial Evaluation Progress Note 1 Norm Initial Evaluation Progress Note 1 Norm    Strength 0 lbs 0 lbs 42.6 lbs 30 lbs 30 lbs 37.6 lbs   Lateral Pinch N/T 2 lbs 12.6 lbs N/T 14 lbs 11.4 lbs   Three Jaw Yonatan N/T 2 lbs (assist for position) 12 lbs N/T 11 lbs 11.5 lbs   Tip Pinch N/T N/T 9.6 lbs N/T 9 lbs 9.3 lbs   Box and Block 4 blocks 22 blocks 65 blocks 50 blocks 56 blocks 63.6 blocks   Nine Hole Peg (71+ age) Unable to complete Unable to complete 22.49 seconds N/T 24.55 seconds 24.11 seconds

## 2023-09-21 NOTE — PROGRESS NOTES
Speech-Language Pathology Visit      SLP DAILY NOTE FOR OUTPATIENT THERAPY    Patient: Jennifer Sams   MRN: [id number]  : 1945 78 y.o.  Referring Physician: Liv Haywood, Debra  Date of Visit: 2023      Certification Dates: 23 through 23    Diagnosis:   1. Cerebrovascular accident (CVA), unspecified mechanism (CMS/HCC)    2. Acute ischemic left MCA stroke (CMS/HCC)        Chief Complaints:  Speech    Precautions:      TODAY'S VISIT:    Session Time:  Start Time: 1400  Stop Time: 1500  Time Calculation (min): 60 min   History/Vitals/Pain/Encounter Info - 23 1409        Injury History/Precautions/Daily Required Info    Primary Therapist Magnolia Lundy MS,CCC/SLP     Chief Complaint/Reason for Visit  Speech     Onset of Illness/Injury or Date of Surgery 23     Referring Physician Dr. Haywood     Document Type daily treatment     Patient/Family/Caregiver Comments/Observations Caregiver, Thelma present     Patient reported fall since last visit No        Pain Assessment    Currently in pain Yes     Preferred Pain Scale FACES (Field-Ware FACES Pain Rating Scale)     Word Pain Rating: Activity 2 - mild pain     Pain Rating (word): Post Activity 2 - mild pain        Pain Interventions    Intervention none     Post Intervention Comments none                Daily Treatment Assessment and Plan - 23 1409        Daily Treatment Assessment and Plan    Progress toward goals Progressing     Daily Outcome Summary Improved consistancy of bilabial productions.  Improved CV, CVC, CVCV production.  Pt tolerated imitation of target phoneme/syllables in conversation about animals.     Plan and Recommendations Therapist will make list of target words into flash cards for visual bombardment to pair visual recognition. Continue with tip alveolars isolation CV, CVC, CVCV and VCV                  OBJECTIVE MEASUREMENTS TAKEN TODAY:    None Taken    Today's Treatment:    MOTOR/PRODUCTION  SLP FLOW SHEET    SKILL AREA CURRENT SESSION   SPEECH PRODUCTION (MOTOR)  CPT 49053    Pt will produce bilabials /p, b, m, w/, tip alveolars /t, d, n/ and tense vowels /a, e, I, o,u/ in isolation provided mod A, 80% of trials.  9/21/2023  /p/+++-+ +++++  /b/ ++--- ----+  /m/ +++++ +++++  /w/ ++--+ +++++    9/6/2023  I can't, wait, are you kidding me?, please, bye  /b/: isolation- with tactile cues  /m/:isolation- with tactile cues  /w/: isolation- with tactile cues.   /p/: isolation following auditory bombardment.   /n/: isolation: with placement cues.  Vowels: 100% with decreased rate and DM. Rapid 80%, 60%    8/25/2023  p- unable  b max A,   m- mod A, frequently substitutes /n/   w- Min A,  /a, e, I, o,u/- min to mod A   Pt will produce VC and CV syllables provided the above phonemes, Mod A, 80% of trials 9/21/023  Mat/map, mop/mop, poop/poop, beam/beam, boom, peep, pa/pom    9/6/2023  /w/ + vowels    8/25/2023  /w/ + vowels: Mod to max A   Pt will produce single and bisyllablic words consisting of CVCV, VCV and CVC, Mod A, 80% of presentations. 9/21/2023  CVCV  puppy   Pt will produce simple automatics provided mod A:  1-10  A-G  Yolis and identifiable syllable combinations for MALLY 9/21/2023  1-11, missed 5&6  1-10, missed 5&8  A-D,   A-E    9/6/2023  Approximated for 70% of 1-10   Pt will produce small set of functional words/phrases in unison or in imitation with SLP, then produce 3  successive attempts with 80% accuracy  9/6/2023  Produced 8/14 functional words producing all phonemes.     8/25/2023  Established list of functional words, names. See below.  Pt stimulable for 5/16.  Approximations for remainder.   Noted with difficulty with velars, blends in particular.     Auditory Comprehension and reading to be further assessed for determination of goals to support speech production    GOAL: 8/25/2023  Given direct model, repetition and NV reinforcement, Pt will follow 1-step commands for motor speech  "production up to 1-2 syllable words, 80% of presentations.  2023  Auditory comp,severe impairment:  Aphasia Severity Ratin/5-   All Communication is through fragmentary expression; great need for inference, questioning, and guessing by the listener.  The range of information that can be exchanged is limited, and the listener carries the burden of communication.    Provided pictures/objects, and/or body parts, pt will demonstrate  understanding of \"point to___\" and \" ___\" 85% of trials.  2023  Requires: direct model, repetition NV feedback to place tongue depressor in position for labial proprioception.    Education/Strategy Training  ++----++-+++++    h   entire, part of rest.     Other 2023  Pt was able to participate in conversation about Thelma's horses and pt was compliant with practicing target syllables with target phonemes throughout conversation and added to functional word list.  2023  POA reports suspect decline in cognition recently.  Encouraged to report to MD and encourage improving sleep and nutrition routines.      Functional words and names: 2023  Dick Montes  Annia  Water    I'm thirsty  I'm fine  Arm hurts  Legs hurts  Call me  I'm tired  Feed Loan.      BDAE  Eval: 2023      Aphasia Severity Rating Scale 1/5    CONVERSATIONAL and EXPOSITORY SPEECH       Simple Social Responses Raw Score:   Percentiles:    Complexity Index  Raw Score:    Ratio:   Percentiles:     Narrative Discourse  Short form Raw Score:   Ratio:   Percentiles:     AUDITORY COMPREHEN     Basic Word Discrimination    Commands  Standard Form  2023     Complex Ideational Material  Short Form  2023             Raw Score: 29/37  Percentiles:20%     Raw Score: 3/15  Percentiles: 0-10%       Raw Score: 0/4  Percentiles: 0%                            ARTICULATION  Agility              ORAL EXPRESSION  Recitation, " Yolis, Rhythm              Automatized Sequences  Standard Form   Recitation: 0  Percentiles: 30%tile  Yolis: 1  Percentiles: 30%tile  Rhythm: 1  Percentiles: 60%tile     Raw Score: 0/8  Percentiles: 0%tile       REPETITION  Words  Standard Form     Repetition of Nonsense words     Sentences  Standard Form    Raw Score: 0  Percentiles:0 %tile     Raw Score:  Percentiles:      Raw Score:  Percentiles:                          NAMING  Responsive Naming  Standard Form     Upton Naming Test  Standard Form    Raw Score:   Percentiles:      Raw Score:   Stim cue:   PC:   MC:   Percentiles:   Mean:                 READING  Match case and scripts    Number matching    Picture-Word Matching    Oral Word Reading    Oral Sentence Reading    Oral Sentence Comp    Sentence/Paragraph    Comprehension   Raw Score: 7  Percentiles: 40%tile  Raw Score:   Percentiles:   Raw Score: 10  Percentiles: 100%tile  Raw Score:   Percentiles:   Raw Score:   Percentiles:   Raw Score:   Percentiles:   Raw Score:   Percentiles:   Raw Score:   Percentiles:      WRITING  Form    Letter Choice    Motor Facility    Primer Words    Regular Phonics    Common Irregular Words    Written Picture Naming    Narrative Writing    Raw Score:   Percentiles:   Raw Score:   Percentiles:   Raw Score:   Percentiles:   Raw Score:   Percentiles:   Raw Score:   Percentiles:   Raw Score:   Percentiles:   Raw Score:   Percentiles:   Raw Score:   Percentiles:           IE: 2023   Rating Scale Profile of Speech Characteristics 1-7 scale   Articulatory Agility-phoneme and syllable level 1/   Phrase Length: longest word runs 2/7   Grammatical Form: Variety and use of morphemes 2/7   Melodic Line (Prosody)    Paraphasia in Running Speech 17    Word Finding Relative to Fluency 1/7   Sentence Repetition 1/7   Auditory Comprehension       Aphasia Severity Ratin/5-   All Communication is through fragmentary expression; great need for inference, questioning,  and guessing by the listener.  The range of information that can be exchanged is limited, and the listener carries the burden of communication.

## 2023-09-22 ENCOUNTER — HOSPITAL ENCOUNTER (OUTPATIENT)
Dept: OCCUPATIONAL THERAPY | Age: 78
Setting detail: THERAPIES SERIES
Discharge: HOME | End: 2023-09-22
Attending: PHYSICAL MEDICINE & REHABILITATION
Payer: MEDICARE

## 2023-09-22 ENCOUNTER — HOSPITAL ENCOUNTER (OUTPATIENT)
Dept: SPEECH THERAPY | Age: 78
Setting detail: THERAPIES SERIES
Discharge: HOME | End: 2023-09-22
Attending: PHYSICAL MEDICINE & REHABILITATION
Payer: MEDICARE

## 2023-09-22 DIAGNOSIS — I63.512 ACUTE ISCHEMIC LEFT MCA STROKE (CMS/HCC): ICD-10-CM

## 2023-09-22 DIAGNOSIS — M62.81 MUSCLE WEAKNESS (GENERALIZED): ICD-10-CM

## 2023-09-22 DIAGNOSIS — I63.512 ACUTE ISCHEMIC LEFT MCA STROKE (CMS/HCC): Primary | ICD-10-CM

## 2023-09-22 PROCEDURE — 97530 THERAPEUTIC ACTIVITIES: CPT | Mod: GO,59

## 2023-09-22 PROCEDURE — 92507 TX SP LANG VOICE COMM INDIV: CPT | Mod: GN,KX

## 2023-09-22 PROCEDURE — 97112 NEUROMUSCULAR REEDUCATION: CPT | Mod: GO,59

## 2023-09-22 NOTE — OP OT TREATMENT LOG
OT NEURO FLOW SHEET    EXERCISES  Initial Evaluation  Progress Note 1 CURRENT SESSION  8/16/2023 9/14/2023 TIME   NEURO RE-ED  CPT 55357 TOTAL TIME FOR SESSION 38-52 Minutes   AAROM/AROM     Strengthening      Strength  squeezes with tan theraputty (soft)  Tip pinch on tan and pink theraputty (medium)  Patient tolerated well; therapist supported right SF to prevent pain from trigger finger    Gross Motor Control Capture and roll exercise with light therapy ball; patient with 50% use of RUE to receive ball and roll back, and required max verbal cueing    Towel passing between hands for bilateral coordination      Fine Motor Control Card game for increased fine motor control at right hand for flipping card; patient able to flip cards with right hand with mod to max verbal cueing    Sitting Jennifer/Balance     Standing Jennifer/Balance     Sensory re-education      Retraining     THERE ACT  CPT 91930 TOTAL TIME FOR SESSION 8-22 Minutes   Pain, Vitals, Meds, Etc. Vitals taken,Pain monitored    Assessment     HEP Right hand stretching    9/21/2023:   -Educated on use of rice/bean sensory bin, massage, sensory re-education with deep pressure and textures. Issued tubigrip for RUE sensory re-education and advised to wear 1x per day. Continued to discuss edema management.     Functional Mobility     Transfers     THERE EX  CPT 29870 TOTAL TIME FOR SESSION 0-7 Minutes   PROM     Activity Tolerance     SELF-CARE  CPT 87747 TOTAL TIME FOR SESSION Not performed   Pt Education/Safety     ADL Training     IADL Training     MODALITIES  CPT 12467 TOTAL TIME FOR SESSION Not performed   Ice/Heat     ATTENDED E-STIM  CPT 56181 TOTAL TIME FOR SESSION Not performed   Attended E-Stim     SPLINTING  CPT 04801 TOTAL TIME FOR SESSION Not performed   Fabrication/Check Out     Fit     Training     GROUP  CPT 52133 TOTAL TIME FOR SESSION Not performed             Normative Range for Female 75+ Years of Age       Right Hand   Left Hand       Initial Evaluation Progress Note 1 Norm Initial Evaluation Progress Note 1 Norm    Strength 0 lbs 0 lbs 42.6 lbs 30 lbs 30 lbs 37.6 lbs   Lateral Pinch N/T 2 lbs 12.6 lbs N/T 14 lbs 11.4 lbs   Three Jaw Yonatan N/T 2 lbs (assist for position) 12 lbs N/T 11 lbs 11.5 lbs   Tip Pinch N/T N/T 9.6 lbs N/T 9 lbs 9.3 lbs   Box and Block 4 blocks 22 blocks 65 blocks 50 blocks 56 blocks 63.6 blocks   Nine Hole Peg (71+ age) Unable to complete Unable to complete 22.49 seconds N/T 24.55 seconds 24.11 seconds

## 2023-09-22 NOTE — PROGRESS NOTES
Occupational Therapy Visit    OT DAILY NOTE FOR OUTPATIENT THERAPY    Patient: Jennifer Sams   MRN: [id number]  : 1945 78 y.o.  Referring Physician: Liv Haywood, Debra  Date of Visit: 2023      Certification Dates: 23 through 23    Diagnosis:   1. Acute ischemic left MCA stroke (CMS/HCC)    2. Muscle weakness (generalized)        Chief Complaints:   Brain Injury    Precautions:       TODAY'S VISIT    Time In Session:  Start Time: 1405  Stop Time: 1500  Time Calculation (min): 55 min   History/Vitals/Pain/Encounter Info - 23 1407        Injury History/Precautions/Daily Required Info    Document Type daily treatment     Chief Complaint/Reason for Visit  Brain Injury     Onset of Illness/Injury or Date of Surgery 23     Referring Physician Dr. Haywood     History of present illness/functional impairment The patient is a 78-year-old  female with a history of AAA, heart block s/p cardiac pacemaker, glaucoma, hyperlipidemia, coronary artery disease, NSTEMI, who presented to [facility] on  after she has not been seen by her friends for a few days.  Patient lives alone and when she was found by EMS she was confused and combative.  In the emergency department she was aphasic with right-sided weakness and hypertensive to the 190 systolic.  CT scan of the head revealed an acute infarct in left MCA with mild bleeding.  Echo showed an EF of 43% and a bubble study was negative.  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  Later this was transitioned to apixaban.  She was continued on statin for secondary prevention.  She required virtual shivani while in the hospital but this was not effective.  She was evaluated by speech therapy and cleared for a soft and bite-size diet with moderately thick liquids.'     Patient/Family/Caregiver Comments/Observations Patient arrives with  caregiver Thelma     Patient reported fall since last visit No        Pain Assessment    Currently in pain No/Denies        Pre Activity Vital Signs    /80     BP Location Left upper arm         Services    Do You Speak a Language Other Than English at Home? no                Daily Treatment Assessment and Plan - 09/22/23 1534        Daily Treatment Assessment and Plan    Progress toward goals Progressing     Daily Outcome Summary Patient seen in OP OT and reluctant to use RUE for activities today, requiring mod to max cueing. Patient demonstrates able to hold and flip playing cards with right hand, with 1 drop today. She demonstrates 50% incorporation of right hand with ball rolling exercise, requiring max cueing today. Patient able to pass towel to and from right hand with vision occluded (behind head and behind back), with 2-3 drops today. Patient demonstrates improving fine and gross motor control at right hand but still hesistant to use with routine tasks.     Plan and Recommendations Incorporate rehabilitative and compensatory strategies for RUE sensory and motor recovery                     OBJECTIVE DATA TAKEN TODAY    None Taken    Today's Treatment:      OT NEURO FLOW SHEET    EXERCISES  Initial Evaluation  Progress Note 1 CURRENT SESSION  8/16/2023 9/14/2023 TIME   NEURO RE-ED  CPT 98948 TOTAL TIME FOR SESSION 38-52 Minutes   AAROM/AROM     Strengthening      Strength  squeezes with tan theraputty (soft)  Tip pinch on tan and pink theraputty (medium)  Patient tolerated well; therapist supported right SF to prevent pain from trigger finger    Gross Motor Control Capture and roll exercise with light therapy ball; patient with 50% use of RUE to receive ball and roll back, and required max verbal cueing    Towel passing between hands for bilateral coordination      Fine Motor Control Card game for increased fine motor control at right hand for flipping card; patient able to flip cards  with right hand with mod to max verbal cueing    Sitting Jennifer/Balance     Standing Jennifer/Balance     Sensory re-education      Retraining     THERE ACT  CPT 09859 TOTAL TIME FOR SESSION 8-22 Minutes   Pain, Vitals, Meds, Etc. Vitals taken,Pain monitored    Assessment     HEP Right hand stretching    9/21/2023:   -Educated on use of rice/bean sensory bin, massage, sensory re-education with deep pressure and textures. Issued tubigrip for RUE sensory re-education and advised to wear 1x per day. Continued to discuss edema management.     Functional Mobility     Transfers     THERE EX  CPT 93588 TOTAL TIME FOR SESSION 0-7 Minutes   PROM     Activity Tolerance     SELF-CARE  CPT 50328 TOTAL TIME FOR SESSION Not performed   Pt Education/Safety     ADL Training     IADL Training     MODALITIES  CPT 94737 TOTAL TIME FOR SESSION Not performed   Ice/Heat     ATTENDED E-STIM  CPT 82529 TOTAL TIME FOR SESSION Not performed   Attended E-Stim     SPLINTING  CPT 46917 TOTAL TIME FOR SESSION Not performed   Fabrication/Check Out     Fit     Training     GROUP  CPT 04166 TOTAL TIME FOR SESSION Not performed             Normative Range for Female 75+ Years of Age       Right Hand   Left Hand      Initial Evaluation Progress Note 1 Norm Initial Evaluation Progress Note 1 Norm    Strength 0 lbs 0 lbs 42.6 lbs 30 lbs 30 lbs 37.6 lbs   Lateral Pinch N/T 2 lbs 12.6 lbs N/T 14 lbs 11.4 lbs   Three Jaw Yonatan N/T 2 lbs (assist for position) 12 lbs N/T 11 lbs 11.5 lbs   Tip Pinch N/T N/T 9.6 lbs N/T 9 lbs 9.3 lbs   Box and Block 4 blocks 22 blocks 65 blocks 50 blocks 56 blocks 63.6 blocks   Nine Hole Peg (71+ age) Unable to complete Unable to complete 22.49 seconds N/T 24.55 seconds 24.11 seconds

## 2023-09-22 NOTE — OP SLP TREATMENT LOG
MOTOR/PRODUCTION SLP FLOW SHEET    SKILL AREA CURRENT SESSION   SPEECH PRODUCTION (MOTOR)  CPT 37096    Pt will produce bilabials /p, b, m, w/, tip alveolars /t, d, n/ and tense vowels /a, e, I, o,u/ in isolation provided mod A, 80% of trials.      9/21/2023  /p/+++-+ +++++  /b/ ++--- ----+  /m/ +++++ +++++  /w/ ++--+ +++++    9/6/2023  I can't, wait, are you kidding me?, please, bye  /b/: isolation- with tactile cues  /m/:isolation- with tactile cues  /w/: isolation- with tactile cues.   /p/: isolation following auditory bombardment.   /n/: isolation: with placement cues.  Vowels: 100% with decreased rate and DM. Rapid 80%, 60%    8/25/2023  p- unable  b max A,   m- mod A, frequently substitutes /n/   w- Min A,  /a, e, I, o,u/- min to mod A   Pt will produce VC and CV syllables provided the above phonemes, Mod A, 80% of trials 9/21/023  Mat/map, mop/mop, poop/poop, beam/beam, boom, peep, pa/pom    9/6/2023  /w/ + vowels    8/25/2023  /w/ + vowels: Mod to max A   Pt will produce single and bisyllablic words consisting of CVCV, VCV and CVC, Mod A, 80% of presentations. 9/21/2023  CVCV  puppy   Pt will produce simple automatics provided mod A:  1-10  A-G  Yolis and identifiable syllable combinations for MALLY 9/21/2023  1-11, missed 5&6  1-10, missed 5&8  A-D,   A-E    9/6/2023  Approximated for 70% of 1-10   Pt will produce small set of functional words/phrases in unison or in imitation with SLP, then produce 3  successive attempts with 80% accuracy  9/22/23: Produced 9/14 functional words producing all phonemes. Frustrated throughout.    9/6/2023  Produced 8/14 functional words producing all phonemes.       8/25/2023  Established list of functional words, names. See below.  Pt stimulable for 5/16.  Approximations for remainder.   Noted with difficulty with velars, blends in particular.     Auditory Comprehension and reading to be further assessed for determination of goals to support speech production    GOAL:  "2023  Given direct model, repetition and NV reinforcement, Pt will follow 1-step commands for motor speech production up to 1-2 syllable words, 80% of presentations.  2023  Auditory comp,severe impairment:  Aphasia Severity Ratin/5-   All Communication is through fragmentary expression; great need for inference, questioning, and guessing by the listener.  The range of information that can be exchanged is limited, and the listener carries the burden of communication.    Provided pictures/objects, and/or body parts, pt will demonstrate  understanding of \"point to___\" and \" ___\" 85% of trials.  2023  Requires: direct model, repetition NV feedback to place tongue depressor in position for labial proprioception.     23 Pt was able to complete matching and ID words and pictures with 100% accuracy.    Education/Strategy Training  ++----++-+++++ 9/14   h   entire, part of rest.     Other 2023  Pt was able to participate in conversation about Thelma's horses and pt was compliant with practicing target syllables with target phonemes throughout conversation and added to functional word list.  2023  POA reports suspect decline in cognition recently.  Encouraged to report to MD and encourage improving sleep and nutrition routines.      Functional words and names: 2023  Dick Montes  Annia  Water    I'm thirsty  I'm fine  Arm hurts  Legs hurts  Call me  I'm tired  Feed Loan.      BDAE  Eval: 2023      Aphasia Severity Rating Scale 1/5    CONVERSATIONAL and EXPOSITORY SPEECH       Simple Social Responses Raw Score:   Percentiles:    Complexity Index  Raw Score:    Ratio:   Percentiles:     Narrative Discourse  Short form Raw Score:   Ratio:   Percentiles:     AUDITORY COMPREHEN     Basic Word Discrimination    Commands  Standard Form  2023     Complex Ideational Material  Short Form  2023           "   Raw Score: 29/37  Percentiles:20%     Raw Score: 3/15  Percentiles: 0-10%       Raw Score: 0/4  Percentiles: 0%                            ARTICULATION  Agility              ORAL EXPRESSION  Recitation, Yolis, Rhythm              Automatized Sequences  Standard Form   Recitation: 0  Percentiles: 30%tile  Yolis: 1  Percentiles: 30%tile  Rhythm: 1  Percentiles: 60%tile     Raw Score: 0/8  Percentiles: 0%tile       REPETITION  Words  Standard Form     Repetition of Nonsense words     Sentences  Standard Form    Raw Score: 0  Percentiles:0 %tile     Raw Score:  Percentiles:      Raw Score:  Percentiles:                          NAMING  Responsive Naming  Standard Form     Ellis Naming Test  Standard Form    Raw Score:   Percentiles:      Raw Score:   Stim cue:   PC:   MC:   Percentiles:   Mean:                 READING  Match case and scripts    Number matching    Picture-Word Matching    Oral Word Reading    Oral Sentence Reading    Oral Sentence Comp    Sentence/Paragraph    Comprehension   Raw Score: 7  Percentiles: 40%tile  Raw Score:   Percentiles:   Raw Score: 10  Percentiles: 100%tile  Raw Score:   Percentiles:   Raw Score:   Percentiles:   Raw Score:   Percentiles:   Raw Score:   Percentiles:   Raw Score:   Percentiles:      WRITING  Form    Letter Choice    Motor Facility    Primer Words    Regular Phonics    Common Irregular Words    Written Picture Naming    Narrative Writing    Raw Score:   Percentiles:   Raw Score:   Percentiles:   Raw Score:   Percentiles:   Raw Score:   Percentiles:   Raw Score:   Percentiles:   Raw Score:   Percentiles:   Raw Score:   Percentiles:   Raw Score:   Percentiles:           IE: 8/16/2023   Rating Scale Profile of Speech Characteristics 1-7 scale   Articulatory Agility-phoneme and syllable level 1/7   Phrase Length: longest word runs 2/7   Grammatical Form: Variety and use of morphemes 2/7   Melodic Line (Prosody) 1/7   Paraphasia in Running Speech 1/7    Word Finding  Relative to Fluency 1/7   Sentence Repetition /7   Auditory Comprehension /7      Aphasia Severity Ratin/5-   All Communication is through fragmentary expression; great need for inference, questioning, and guessing by the listener.  The range of information that can be exchanged is limited, and the listener carries the burden of communication.

## 2023-09-22 NOTE — PROGRESS NOTES
Speech-Language Pathology Visit      SLP DAILY NOTE FOR OUTPATIENT THERAPY    Patient: Jennifer Sams   MRN: [id number]  : 1945 78 y.o.  Referring Physician: Liv Haywood, Debra  Date of Visit: 2023      Certification Dates: 23 through 23    Diagnosis:   1. Acute ischemic left MCA stroke (CMS/HCC)        Chief Complaints:  Speech    Precautions:          TODAY'S VISIT:    Session Time:  Start Time: 1300  Stop Time: 1400  Time Calculation (min): 60 min   History/Vitals/Pain/Encounter Info - 23 1308        Injury History/Precautions/Daily Required Info    Primary Therapist Edda Joseph M.S.CC-SLP covering for Magnolia Lundy MS,CCC/SLP     Chief Complaint/Reason for Visit  Speech     Onset of Illness/Injury or Date of Surgery 23     Referring Physician Dr. Haywood     Limitations/Impairments safety/cognitive     Document Type daily treatment     Patient/Family/Caregiver Comments/Observations Caregiver Thelma present. Pt pleasant and cooperative throughout session.     Patient reported fall since last visit No        Pain Assessment    Currently in pain Yes     Preferred Pain Scale FACES (Field-Ware FACES Pain Rating Scale)     Pain Side/Orientation right     Pain: Body location Arm     Word Pain Rating: Activity 2 - mild pain        Pain Interventions    Intervention none     Post Intervention Comments none                Daily Treatment Assessment and Plan - 23 1308        Daily Treatment Assessment and Plan    Progress toward goals Progressing     Daily Outcome Summary Pt did well on receptive tasks.Improved CV, CVC, and CVCV productive. Frustrated throughout session, but good awareness. Appropriate spontanous speech. Pt may benefit from an AAC evaluation.     Plan and Recommendations Therapist will make list of target words into flash cards for visual bombardment to pair visual recognition. Continue with tip alveolars isolation CV, CVC, CVCV and VCV                   OBJECTIVE MEASUREMENTS TAKEN TODAY:    None Taken    Today's Treatment:    MOTOR/PRODUCTION SLP FLOW SHEET    SKILL AREA CURRENT SESSION   SPEECH PRODUCTION (MOTOR)  CPT 53531    Pt will produce bilabials /p, b, m, w/, tip alveolars /t, d, n/ and tense vowels /a, e, I, o,u/ in isolation provided mod A, 80% of trials.      9/21/2023  /p/+++-+ +++++  /b/ ++--- ----+  /m/ +++++ +++++  /w/ ++--+ +++++    9/6/2023  I can't, wait, are you kidding me?, please, bye  /b/: isolation- with tactile cues  /m/:isolation- with tactile cues  /w/: isolation- with tactile cues.   /p/: isolation following auditory bombardment.   /n/: isolation: with placement cues.  Vowels: 100% with decreased rate and DM. Rapid 80%, 60%    8/25/2023  p- unable  b max A,   m- mod A, frequently substitutes /n/   w- Min A,  /a, e, I, o,u/- min to mod A   Pt will produce VC and CV syllables provided the above phonemes, Mod A, 80% of trials 9/21/023  Mat/map, mop/mop, poop/poop, beam/beam, boom, peep, pa/pom    9/6/2023  /w/ + vowels    8/25/2023  /w/ + vowels: Mod to max A   Pt will produce single and bisyllablic words consisting of CVCV, VCV and CVC, Mod A, 80% of presentations. 9/21/2023  CVCV  puppy   Pt will produce simple automatics provided mod A:  1-10  A-G  Yolis and identifiable syllable combinations for MALLY 9/21/2023  1-11, missed 5&6  1-10, missed 5&8  A-D,   A-E    9/6/2023  Approximated for 70% of 1-10   Pt will produce small set of functional words/phrases in unison or in imitation with SLP, then produce 3  successive attempts with 80% accuracy  9/22/23: Produced 9/14 functional words producing all phonemes. Frustrated throughout.    9/6/2023  Produced 8/14 functional words producing all phonemes.       8/25/2023  Established list of functional words, names. See below.  Pt stimulable for 5/16.  Approximations for remainder.   Noted with difficulty with velars, blends in particular.     Auditory Comprehension and reading to be  "further assessed for determination of goals to support speech production    GOAL: 2023  Given direct model, repetition and NV reinforcement, Pt will follow 1-step commands for motor speech production up to 1-2 syllable words, 80% of presentations.  2023  Auditory comp,severe impairment:  Aphasia Severity Ratin/5-   All Communication is through fragmentary expression; great need for inference, questioning, and guessing by the listener.  The range of information that can be exchanged is limited, and the listener carries the burden of communication.    Provided pictures/objects, and/or body parts, pt will demonstrate  understanding of \"point to___\" and \" ___\" 85% of trials.  2023  Requires: direct model, repetition NV feedback to place tongue depressor in position for labial proprioception.     23 Pt was able to complete matching and ID words and pictures with 100% accuracy.    Education/Strategy Training  ++----++-+++++ 9/14   h   entire, part of rest.     Other 2023  Pt was able to participate in conversation about Thelma's horses and pt was compliant with practicing target syllables with target phonemes throughout conversation and added to functional word list.  2023  POA reports suspect decline in cognition recently.  Encouraged to report to MD and encourage improving sleep and nutrition routines.      Functional words and names: 2023  Dick Montes  Annia  Water    I'm thirsty  I'm fine  Arm hurts  Legs hurts  Call me  I'm tired  Feed Loan.      BDAE  Eval: 2023      Aphasia Severity Rating Scale 1/5    CONVERSATIONAL and EXPOSITORY SPEECH       Simple Social Responses Raw Score:   Percentiles:    Complexity Index  Raw Score:    Ratio:   Percentiles:     Narrative Discourse  Short form Raw Score:   Ratio:   Percentiles:     AUDITORY COMPREHEN     Basic Word Discrimination    Commands  Standard " Form  8/25/2023     Complex Ideational Material  Short Form  8/25/2023             Raw Score: 29/37  Percentiles:20%     Raw Score: 3/15  Percentiles: 0-10%       Raw Score: 0/4  Percentiles: 0%                            ARTICULATION  Agility              ORAL EXPRESSION  Recitation, Yolis, Rhythm              Automatized Sequences  Standard Form   Recitation: 0  Percentiles: 30%tile  Yolis: 1  Percentiles: 30%tile  Rhythm: 1  Percentiles: 60%tile     Raw Score: 0/8  Percentiles: 0%tile       REPETITION  Words  Standard Form     Repetition of Nonsense words     Sentences  Standard Form    Raw Score: 0  Percentiles:0 %tile     Raw Score:  Percentiles:      Raw Score:  Percentiles:                          NAMING  Responsive Naming  Standard Form     [place] Naming Test  Standard Form    Raw Score:   Percentiles:      Raw Score:   Stim cue:   PC:   MC:   Percentiles:   Mean:                 READING  Match case and scripts    Number matching    Picture-Word Matching    Oral Word Reading    Oral Sentence Reading    Oral Sentence Comp    Sentence/Paragraph    Comprehension   Raw Score: 7  Percentiles: 40%tile  Raw Score:   Percentiles:   Raw Score: 10  Percentiles: 100%tile  Raw Score:   Percentiles:   Raw Score:   Percentiles:   Raw Score:   Percentiles:   Raw Score:   Percentiles:   Raw Score:   Percentiles:      WRITING  Form    Letter Choice    Motor Facility    Primer Words    Regular Phonics    Common Irregular Words    Written Picture Naming    Narrative Writing    Raw Score:   Percentiles:   Raw Score:   Percentiles:   Raw Score:   Percentiles:   Raw Score:   Percentiles:   Raw Score:   Percentiles:   Raw Score:   Percentiles:   Raw Score:   Percentiles:   Raw Score:   Percentiles:           IE: 8/16/2023   Rating Scale Profile of Speech Characteristics 1-7 scale   Articulatory Agility-phoneme and syllable level 1/7   Phrase Length: longest word runs 2/7   Grammatical Form: Variety and use of morphemes 2/7    Melodic Line (Prosody)    Paraphasia in Running Speech     Word Finding Relative to Fluency    Sentence Repetition    Auditory Comprehension       Aphasia Severity Ratin/5-   All Communication is through fragmentary expression; great need for inference, questioning, and guessing by the listener.  The range of information that can be exchanged is limited, and the listener carries the burden of communication.

## 2023-09-26 ENCOUNTER — HOSPITAL ENCOUNTER (OUTPATIENT)
Dept: SPEECH THERAPY | Age: 78
Setting detail: THERAPIES SERIES
Discharge: HOME | End: 2023-09-26
Attending: PHYSICAL MEDICINE & REHABILITATION
Payer: MEDICARE

## 2023-09-26 ENCOUNTER — HOSPITAL ENCOUNTER (OUTPATIENT)
Dept: OCCUPATIONAL THERAPY | Age: 78
Setting detail: THERAPIES SERIES
Discharge: HOME | End: 2023-09-26
Attending: PHYSICAL MEDICINE & REHABILITATION
Payer: MEDICARE

## 2023-09-26 DIAGNOSIS — I63.512 ACUTE ISCHEMIC LEFT MCA STROKE (CMS/HCC): Primary | ICD-10-CM

## 2023-09-26 DIAGNOSIS — M62.81 MUSCLE WEAKNESS (GENERALIZED): ICD-10-CM

## 2023-09-26 DIAGNOSIS — I63.9 CEREBROVASCULAR ACCIDENT (CVA), UNSPECIFIED MECHANISM (CMS/HCC): ICD-10-CM

## 2023-09-26 PROCEDURE — 92507 TX SP LANG VOICE COMM INDIV: CPT | Mod: GN

## 2023-09-26 PROCEDURE — 97112 NEUROMUSCULAR REEDUCATION: CPT | Mod: GO,59

## 2023-09-26 PROCEDURE — 97530 THERAPEUTIC ACTIVITIES: CPT | Mod: GO,59

## 2023-09-26 NOTE — PROGRESS NOTES
Speech-Language Pathology Visit      SLP DAILY NOTE FOR OUTPATIENT THERAPY    Patient: Jennifer Sams   MRN: [id number]  : 1945 78 y.o.  Referring Physician: Liv Haywood, Debra  Date of Visit: 2023      Certification Dates: 23 through 23    Diagnosis:   1. Acute ischemic left MCA stroke (CMS/HCC)    2. Cerebrovascular accident (CVA), unspecified mechanism (CMS/HCC)        Chief Complaints:  apraxia    Precautions:        TODAY'S VISIT:    Session Time:  Start Time: 1500  Stop Time: 1600  Time Calculation (min): 60 min   History/Vitals/Pain/Encounter Info - 23 1502        Injury History/Precautions/Daily Required Info    Primary Therapist Magnolia Lundy MS,CCC/SLP     Chief Complaint/Reason for Visit  apraxia     Onset of Illness/Injury or Date of Surgery 23     Referring Physician Dr. Haywood     Limitations/Impairments safety/cognitive     Document Type daily treatment     Patient/Family/Caregiver Comments/Observations Pt expressed not feeling well due to not eating. Provided a soda and crackers prior to starting session     Patient reported fall since last visit No        Pain Assessment    Currently in pain No/Denies     Preferred Pain Scale FACES (Field-Ware FACES Pain Rating Scale)        Pain Interventions    Intervention --     Post Intervention Comments --                Daily Treatment Assessment and Plan - 23 1502        Daily Treatment Assessment and Plan    Progress toward goals Progressing     Daily Outcome Summary Pt motivated with use of iPad this date.  POA expressed interest in an AAC device.  Pt and POA watched short video on [employer], expressed interest in consideration. Pt trialed Tactus for AC, naming and apraxia.     Plan and Recommendations Therapist will make list of target words into flash cards for visual bombardment to pair visual recognition. Continue with tip alveolars isolation CV, CVC, CVCV and VCV. REturn to  trialling [employer].                  OBJECTIVE MEASUREMENTS TAKEN TODAY:    None Taken    Today's Treatment:    MOTOR/PRODUCTION SLP FLOW SHEET    SKILL AREA CURRENT SESSION   SPEECH PRODUCTION (MOTOR)  CPT 83516    Pt will produce bilabials /p, b, m, w/, tip alveolars /t, d, n/ and tense vowels /a, e, I, o,u/ in isolation provided mod A, 80% of trials.  9/26/2023  /p/+++-+ +++++  /b/ ++--- ----+  /m/ +++++ +++++  /w/ ++--+ +++++    9/21/2023  /p/+++-+ +++++  /b/ ++--- ----+  /m/ +++++ +++++  /w/ ++--+ +++++    9/6/2023  I can't, wait, are you kidding me?, please, bye  /b/: isolation- with tactile cues  /m/:isolation- with tactile cues  /w/: isolation- with tactile cues.   /p/: isolation following auditory bombardment.   /n/: isolation: with placement cues.  Vowels: 100% with decreased rate and DM. Rapid 80%, 60%    8/25/2023  p- unable  b max A,   m- mod A, frequently substitutes /n/   w- Min A,  /a, e, I, o,u/- min to mod A   Pt will produce VC and CV syllables provided the above phonemes, Mod A, 80% of trials 9/21/023  Mat/map, mop/mop, poop/poop, beam/beam, boom, peep, pa/pom    9/6/2023  /w/ + vowels    8/25/2023  /w/ + vowels: Mod to max A   Pt will produce single and bisyllablic words consisting of CVCV, VCV and CVC, Mod A, 80% of presentations. 9/21/2023  CVCV  puppy   Pt will produce simple automatics provided mod A:  1-10  A-G  Yolis and identifiable syllable combinations for MALLY 9/21/2023  1-11, missed 5&6  1-10, missed 5&8  A-D,   A-E    9/6/2023  Approximated for 70% of 1-10   Pt will produce small set of functional words/phrases in unison or in imitation with SLP, then produce 3  successive attempts with 80% accuracy  9/22/23: Produced 9/14 functional words producing all phonemes. Frustrated throughout.    9/6/2023  Produced 8/14 functional words producing all phonemes.       8/25/2023  Established list of functional words, names. See below.  Pt stimulable for 5/16.  Approximations for remainder.  "  Noted with difficulty with velars, blends in particular.     Auditory Comprehension and reading to be further assessed for determination of goals to support speech production    GOAL: 2023  Given direct model, repetition and NV reinforcement, Pt will follow 1-step commands for motor speech production up to 1-2 syllable words, 80% of presentations.  2023  F6 Aud comp moderate: 10/10  F5 hard: 8/10    Readin word phrases- F4 to picture: 6/10  Reading: phrase completion F2. Provided read word 8/10    Naming: Pt benefited from cumulative hints and approximated naming item provided Phrase completion with first phoneme.     2023  Auditory comp,severe impairment:  Aphasia Severity Ratin/5-   All Communication is through fragmentary expression; great need for inference, questioning, and guessing by the listener.  The range of information that can be exchanged is limited, and the listener carries the burden of communication.    Provided pictures/objects, and/or body parts, pt will demonstrate  understanding of \"point to___\" and \" ___\" 85% of trials.  2023  Requires: direct model, repetition NV feedback to place tongue depressor in position for labial proprioception.     23 Pt was able to complete matching and ID words and pictures with 100% accuracy.    Education/Strategy Training  ++----++-+++++ 9/14   h   entire, part of rest.     Other 2023  Pt was able to participate in conversation about Thelma's horses and pt was compliant with practicing target syllables with target phonemes throughout conversation and added to functional word list.  2023  POA reports suspect decline in cognition recently.  Encouraged to report to MD and encourage improving sleep and nutrition routines.      Functional words and names: 2023  Dick Milner  Water    I'm thirsty  I'm fine  Arm hurts  Legs hurts  Call me  I'm " tired  Feed Loan.

## 2023-09-26 NOTE — OP SLP TREATMENT LOG
MOTOR/PRODUCTION SLP FLOW SHEET    SKILL AREA CURRENT SESSION   SPEECH PRODUCTION (MOTOR)  CPT 84375    Pt will produce bilabials /p, b, m, w/, tip alveolars /t, d, n/ and tense vowels /a, e, I, o,u/ in isolation provided mod A, 80% of trials.  9/26/2023  /p/+++-+ +++++  /b/ ++--- ----+  /m/ +++++ +++++  /w/ ++--+ +++++    9/21/2023  /p/+++-+ +++++  /b/ ++--- ----+  /m/ +++++ +++++  /w/ ++--+ +++++    9/6/2023  I can't, wait, are you kidding me?, please, bye  /b/: isolation- with tactile cues  /m/:isolation- with tactile cues  /w/: isolation- with tactile cues.   /p/: isolation following auditory bombardment.   /n/: isolation: with placement cues.  Vowels: 100% with decreased rate and DM. Rapid 80%, 60%    8/25/2023  p- unable  b max A,   m- mod A, frequently substitutes /n/   w- Min A,  /a, e, I, o,u/- min to mod A   Pt will produce VC and CV syllables provided the above phonemes, Mod A, 80% of trials 9/21/023  Mat/map, mop/mop, poop/poop, beam/beam, boom, peep, pa/pom    9/6/2023  /w/ + vowels    8/25/2023  /w/ + vowels: Mod to max A   Pt will produce single and bisyllablic words consisting of CVCV, VCV and CVC, Mod A, 80% of presentations. 9/21/2023  CVCV  puppy   Pt will produce simple automatics provided mod A:  1-10  A-G  Yolis and identifiable syllable combinations for MALLY 9/21/2023  1-11, missed 5&6  1-10, missed 5&8  A-D,   A-E    9/6/2023  Approximated for 70% of 1-10   Pt will produce small set of functional words/phrases in unison or in imitation with SLP, then produce 3  successive attempts with 80% accuracy  9/22/23: Produced 9/14 functional words producing all phonemes. Frustrated throughout.    9/6/2023  Produced 8/14 functional words producing all phonemes.       8/25/2023  Established list of functional words, names. See below.  Pt stimulable for 5/16.  Approximations for remainder.   Noted with difficulty with velars, blends in particular.     Auditory Comprehension and reading to be further  "assessed for determination of goals to support speech production    GOAL: 2023  Given direct model, repetition and NV reinforcement, Pt will follow 1-step commands for motor speech production up to 1-2 syllable words, 80% of presentations.  2023  F6 Aud comp moderate: 10/10  F5 hard: 8/10    Readin word phrases- F4 to picture: 6/10  Reading: phrase completion F2. Provided read word 8/10    Naming: Pt benefited from cumulative hints and approximated naming item provided Phrase completion with first phoneme.     2023  Auditory comp,severe impairment:  Aphasia Severity Ratin/5-   All Communication is through fragmentary expression; great need for inference, questioning, and guessing by the listener.  The range of information that can be exchanged is limited, and the listener carries the burden of communication.    Provided pictures/objects, and/or body parts, pt will demonstrate  understanding of \"point to___\" and \" ___\" 85% of trials.  2023  Requires: direct model, repetition NV feedback to place tongue depressor in position for labial proprioception.     23 Pt was able to complete matching and ID words and pictures with 100% accuracy.    Education/Strategy Training  ++----++-+++++ 9/14   h   entire, part of rest.     Other 2023  Pt was able to participate in conversation about Thelma's horses and pt was compliant with practicing target syllables with target phonemes throughout conversation and added to functional word list.  2023  POA reports suspect decline in cognition recently.  Encouraged to report to MD and encourage improving sleep and nutrition routines.      Functional words and names: 2023  Dick Davis  [place]  Annia  Water    I'm thirsty  I'm fine  Arm hurts  Legs hurts  Call me  I'm tired  Feed Loan.      "

## 2023-09-27 NOTE — PROGRESS NOTES
Occupational Therapy Visit    OT DAILY NOTE FOR OUTPATIENT THERAPY    Patient: Jennifer Sams   MRN: [id number]  : 1945 78 y.o.  Referring Physician: Liv Haywood, Debra  Date of Visit: 2023      Certification Dates: 23 through 23    Diagnosis:   1. Acute ischemic left MCA stroke (CMS/HCC)    2. Muscle weakness (generalized)        Chief Complaints:   Brain Injury    Precautions:       TODAY'S VISIT    Time In Session:  Start Time: 1605  Stop Time: 1700  Time Calculation (min): 55 min   History/Vitals/Pain/Encounter Info - 23        Injury History/Precautions/Daily Required Info    Document Type daily treatment     Chief Complaint/Reason for Visit  Brain Injury     Onset of Illness/Injury or Date of Surgery 23     Referring Physician Dr. Haywood     History of present illness/functional impairment The patient is a 78-year-old  female with a history of AAA, heart block s/p cardiac pacemaker, glaucoma, hyperlipidemia, coronary artery disease, NSTEMI, who presented to [facility] on  after she has not been seen by her friends for a few days.  Patient lives alone and when she was found by EMS she was confused and combative.  In the emergency department she was aphasic with right-sided weakness and hypertensive to the 190 systolic.  CT scan of the head revealed an acute infarct in left MCA with mild bleeding.  Echo showed an EF of 43% and a bubble study was negative.  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  Later this was transitioned to apixaban.  She was continued on statin for secondary prevention.  She required virtual shivani while in the hospital but this was not effective.  She was evaluated by speech therapy and cleared for a soft and bite-size diet with moderately thick liquids.'     Patient/Family/Caregiver Comments/Observations Patient arrives today  w/o friend and caregiver Thelma     Patient reported fall since last visit No        Pain Assessment    Currently in pain No/Denies         Services    Do You Speak a Language Other Than English at Home? no                Daily Treatment Assessment and Plan - 09/26/23 2116        Daily Treatment Assessment and Plan    Progress toward goals Progressing     Daily Outcome Summary Patient was seen in OP OT and focus of today was right handed grasp. Patient challenged by coloring exercise today with frequent drops; patient appeared to benefit from physical assist to stabilize proximal movement as evidenced by improved control of pencil but became frustrated during activity and unable to continue. Patient challenged by grasp and release exercise into container and required frequent cueing to slow down and increase focus on movement at right hand. Patient able to wt bear through RUE today to press into green theraputty.     Plan and Recommendations Incorporate rehabilitative and compensatory strategies for RUE sensory and motor recovery; frustration management                     OBJECTIVE DATA TAKEN TODAY    None Taken    Today's Treatment:      OT NEURO FLOW SHEET    EXERCISES  Initial Evaluation  Progress Note 1 CURRENT SESSION  8/16/2023 9/14/2023 TIME   NEURO RE-ED  CPT 11974 TOTAL TIME FOR SESSION 38-52 Minutes   AAROM/AROM     Strengthening      Strength Wt bearing through RUE with palm press into green (firm) theraputty     Finger press thumb/IF/MF/RF into green theraputty    Gross Motor Control       Fine Motor Control Coloring exercise with use of red foam handle for modified tripod grasp at right hand; patient challenged by activity and required mod physical assist    Grasp/release with pencils into target container  - Incorporated everyday items  - Incorporated soft/small items with theraputty      Sitting Jennifer/Balance     Standing Jennifer/Balance     Sensory re-education Tactile stim with massage and  towel input; patient tolerated well     Retraining     THERE ACT  CPT 14770 TOTAL TIME FOR SESSION 8-22 Minutes   Pain, Vitals, Meds, Etc. Pain assessed, rest breaks provided for frustration management    Assessment     HEP Right hand stretching    9/21/2023:   -Educated on use of rice/bean sensory bin, massage, sensory re-education with deep pressure and textures. Issued tubigrip for RUE sensory re-education and advised to wear 1x per day. Continued to discuss edema management.     Functional Mobility     Transfers     THERE EX  CPT 31003 TOTAL TIME FOR SESSION 0-7 Minutes   PROM     Activity Tolerance     SELF-CARE  CPT 49141 TOTAL TIME FOR SESSION Not performed   Pt Education/Safety     ADL Training     IADL Training     MODALITIES  CPT 83119 TOTAL TIME FOR SESSION Not performed   Ice/Heat     ATTENDED E-STIM  CPT 08106 TOTAL TIME FOR SESSION Not performed   Attended E-Stim     SPLINTING  CPT 70377 TOTAL TIME FOR SESSION Not performed   Fabrication/Check Out     Fit     Training     GROUP  CPT 38416 TOTAL TIME FOR SESSION Not performed             Normative Range for Female 75+ Years of Age       Right Hand   Left Hand      Initial Evaluation Progress Note 1 Norm Initial Evaluation Progress Note 1 Norm    Strength 0 lbs 0 lbs 42.6 lbs 30 lbs 30 lbs 37.6 lbs   Lateral Pinch N/T 2 lbs 12.6 lbs N/T 14 lbs 11.4 lbs   Three Jaw Yonatan N/T 2 lbs (assist for position) 12 lbs N/T 11 lbs 11.5 lbs   Tip Pinch N/T N/T 9.6 lbs N/T 9 lbs 9.3 lbs   Box and Block 4 blocks 22 blocks 65 blocks 50 blocks 56 blocks 63.6 blocks   Nine Hole Peg (71+ age) Unable to complete Unable to complete 22.49 seconds N/T 24.55 seconds 24.11 seconds

## 2023-09-27 NOTE — OP OT TREATMENT LOG
OT NEURO FLOW SHEET    EXERCISES  Initial Evaluation  Progress Note 1 CURRENT SESSION  8/16/2023 9/14/2023 TIME   NEURO RE-ED  CPT 92745 TOTAL TIME FOR SESSION 38-52 Minutes   AAROM/AROM     Strengthening      Strength Wt bearing through RUE with palm press into green (firm) theraputty     Finger press thumb/IF/MF/RF into green theraputty    Gross Motor Control       Fine Motor Control Coloring exercise with use of red foam handle for modified tripod grasp at right hand; patient challenged by activity and required mod physical assist    Grasp/release with pencils into target container  - Incorporated everyday items  - Incorporated soft/small items with theraputty      Sitting Jennifer/Balance     Standing Jennifer/Balance     Sensory re-education Tactile stim with massage and towel input; patient tolerated well     Retraining     THERE ACT  CPT 47509 TOTAL TIME FOR SESSION 8-22 Minutes   Pain, Vitals, Meds, Etc. Pain assessed, rest breaks provided for frustration management    Assessment     HEP Right hand stretching    9/21/2023:   -Educated on use of rice/bean sensory bin, massage, sensory re-education with deep pressure and textures. Issued tubigrip for RUE sensory re-education and advised to wear 1x per day. Continued to discuss edema management.     Functional Mobility     Transfers     THERE EX  CPT 07253 TOTAL TIME FOR SESSION 0-7 Minutes   PROM     Activity Tolerance     SELF-CARE  CPT 64773 TOTAL TIME FOR SESSION Not performed   Pt Education/Safety     ADL Training     IADL Training     MODALITIES  CPT 64150 TOTAL TIME FOR SESSION Not performed   Ice/Heat     ATTENDED E-STIM  CPT 09270 TOTAL TIME FOR SESSION Not performed   Attended E-Stim     SPLINTING  CPT 33706 TOTAL TIME FOR SESSION Not performed   Fabrication/Check Out     Fit     Training     GROUP  CPT 88595 TOTAL TIME FOR SESSION Not performed             Normative Range for Female 75+ Years of Age       Right Hand   Left Hand      Initial Evaluation  Progress Note 1 Norm Initial Evaluation Progress Note 1 Norm    Strength 0 lbs 0 lbs 42.6 lbs 30 lbs 30 lbs 37.6 lbs   Lateral Pinch N/T 2 lbs 12.6 lbs N/T 14 lbs 11.4 lbs   Three Jaw Yonatan N/T 2 lbs (assist for position) 12 lbs N/T 11 lbs 11.5 lbs   Tip Pinch N/T N/T 9.6 lbs N/T 9 lbs 9.3 lbs   Box and Block 4 blocks 22 blocks 65 blocks 50 blocks 56 blocks 63.6 blocks   Nine Hole Peg (71+ age) Unable to complete Unable to complete 22.49 seconds N/T 24.55 seconds 24.11 seconds

## 2023-09-28 ENCOUNTER — HOSPITAL ENCOUNTER (OUTPATIENT)
Dept: SPEECH THERAPY | Age: 78
Setting detail: THERAPIES SERIES
Discharge: HOME | End: 2023-09-28
Attending: PHYSICAL MEDICINE & REHABILITATION
Payer: MEDICARE

## 2023-09-28 ENCOUNTER — HOSPITAL ENCOUNTER (OUTPATIENT)
Dept: OCCUPATIONAL THERAPY | Age: 78
Setting detail: THERAPIES SERIES
Discharge: HOME | End: 2023-09-28
Attending: PHYSICAL MEDICINE & REHABILITATION
Payer: MEDICARE

## 2023-09-28 DIAGNOSIS — I63.512 ACUTE ISCHEMIC LEFT MCA STROKE (CMS/HCC): Primary | ICD-10-CM

## 2023-09-28 DIAGNOSIS — I63.9 CEREBROVASCULAR ACCIDENT (CVA), UNSPECIFIED MECHANISM (CMS/HCC): ICD-10-CM

## 2023-09-28 DIAGNOSIS — M62.81 MUSCLE WEAKNESS (GENERALIZED): ICD-10-CM

## 2023-09-28 PROCEDURE — 92507 TX SP LANG VOICE COMM INDIV: CPT | Mod: GN

## 2023-09-28 PROCEDURE — 97112 NEUROMUSCULAR REEDUCATION: CPT | Mod: GO,59

## 2023-09-28 PROCEDURE — 97530 THERAPEUTIC ACTIVITIES: CPT | Mod: GO,59

## 2023-09-28 NOTE — PROGRESS NOTES
Occupational Therapy Visit    OT DAILY NOTE FOR OUTPATIENT THERAPY    Patient: Jennifer Sams   MRN: [id number]  : 1945 78 y.o.  Referring Physician: Liv Haywood, Debra  Date of Visit: 2023      Certification Dates: 23 through 23    Diagnosis:   1. Acute ischemic left MCA stroke (CMS/HCC)    2. Muscle weakness (generalized)        Chief Complaints:   Brain Injury    Precautions:       TODAY'S VISIT    Time In Session:  Start Time: 1605  Stop Time: 1700  Time Calculation (min): 55 min   History/Vitals/Pain/Encounter Info - 23 1611        Injury History/Precautions/Daily Required Info    Document Type daily treatment     Chief Complaint/Reason for Visit  Brain Injury     Onset of Illness/Injury or Date of Surgery 23     Referring Physician Dr. Haywood     History of present illness/functional impairment The patient is a 78-year-old  female with a history of AAA, heart block s/p cardiac pacemaker, glaucoma, hyperlipidemia, coronary artery disease, NSTEMI, who presented to [facility] on  after she has not been seen by her friends for a few days.  Patient lives alone and when she was found by EMS she was confused and combative.  In the emergency department she was aphasic with right-sided weakness and hypertensive to the 190 systolic.  CT scan of the head revealed an acute infarct in left MCA with mild bleeding.  Echo showed an EF of 43% and a bubble study was negative.  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  Later this was transitioned to apixaban.  She was continued on statin for secondary prevention.  She required virtual shivani while in the hospital but this was not effective.  She was evaluated by speech therapy and cleared for a soft and bite-size diet with moderately thick liquids.'     Patient/Family/Caregiver Comments/Observations Patient is tired today  secondary to poor sleep last night     Patient reported fall since last visit No        Pain Assessment    Currently in pain No/Denies        Pre Activity Vital Signs    /76     BP Location Left upper arm     BP Method Manual     Patient Position Sitting         Services    Do You Speak a Language Other Than English at Home? no                Daily Treatment Assessment and Plan - 09/28/23 1822        Daily Treatment Assessment and Plan    Progress toward goals Progressing     Daily Outcome Summary Patient was seen in OP OT and challenged by laterality exercise demonstrating difficulty with discriminating right vs left; patient cued visually and verbally today, with more success from verbal cueing but slow processing. Exercise for improved laterality added to HEP. Patient able to complete fine motor tasks with right hand only today including gross grasp/release, pinch, and sliding cards on table. She continued to become frustrate easily and relies on LUE even when prompted to only use right hand. Patient with frequent dropping of ball out of right hand with dual tasking and can benefit from further practice for improved automaticity.     Plan and Recommendations Incorporate rehabilitative and compensatory strategies for RUE sensory and motor recovery; frustration management                     OBJECTIVE DATA TAKEN TODAY    None Taken    Today's Treatment:      OT NEURO FLOW SHEET    EXERCISES  Initial Evaluation  Progress Note 1 CURRENT SESSION  8/16/2023 9/14/2023 TIME   NEURO RE-ED  CPT 97178 TOTAL TIME FOR SESSION 38-52 Minutes   AAROM/AROM     Strengthening      Strength     Gross Motor Control       Fine Motor Control BUE fine motor control with the following tasks:  - sort cards  - grasp/release blocks over barrier  - remove post-its from wall  - wipe up water on tabletop  Patient tolerated well    Right hand grasping exercise concurrent with left hand fine motor control exercise and  patient with frequent drops today      Sitting Jennifer/Balance     Standing Jennifer/Balance     Sensory re-education Tactile stim with massage and towel input; patient tolerated well     Retraining Laterality exercise with raising RUE/LUE or both from therapist prompting. Images on computer screen incorporated for additional repetitions and patient with difficulty discriminating L from R today    THERE ACT  CPT 28515 TOTAL TIME FOR SESSION 8-22 Minutes   Pain, Vitals, Meds, Etc. Vitals takenPain assessed, rest breaks provided for frustration management    Assessment     HEP 9/28/23: Laterality exercise added to HEP; patient and caregiver expressed good understanding    Right hand stretching    9/21/2023:   -Educated on use of rice/bean sensory bin, massage, sensory re-education with deep pressure and textures. Issued tubigrip for RUE sensory re-education and advised to wear 1x per day. Continued to discuss edema management.     Functional Mobility     Transfers     THERE EX  CPT 93344 TOTAL TIME FOR SESSION 0-7 Minutes   PROM     Activity Tolerance     SELF-CARE  CPT 42248 TOTAL TIME FOR SESSION Not performed   Pt Education/Safety     ADL Training     IADL Training     MODALITIES  CPT 86494 TOTAL TIME FOR SESSION Not performed   Ice/Heat     ATTENDED E-STIM  CPT 35145 TOTAL TIME FOR SESSION Not performed   Attended E-Stim     SPLINTING  CPT 35046 TOTAL TIME FOR SESSION Not performed   Fabrication/Check Out     Fit     Training     GROUP  CPT 79362 TOTAL TIME FOR SESSION Not performed             Normative Range for Female 75+ Years of Age       Right Hand   Left Hand      Initial Evaluation Progress Note 1 Norm Initial Evaluation Progress Note 1 Norm    Strength 0 lbs 0 lbs 42.6 lbs 30 lbs 30 lbs 37.6 lbs   Lateral Pinch N/T 2 lbs 12.6 lbs N/T 14 lbs 11.4 lbs   Three Jaw Yonatan N/T 2 lbs (assist for position) 12 lbs N/T 11 lbs 11.5 lbs   Tip Pinch N/T N/T 9.6 lbs N/T 9 lbs 9.3 lbs   Box and Block 4 blocks 22 blocks 65  blocks 50 blocks 56 blocks 63.6 blocks   Nine Hole Peg (71+ age) Unable to complete Unable to complete 22.49 seconds N/T 24.55 seconds 24.11 seconds                             English

## 2023-09-28 NOTE — PROGRESS NOTES
Speech-Language Pathology Visit      SLP DAILY NOTE FOR OUTPATIENT THERAPY    Patient: Jennifer Sams   MRN: [id number]  : 1945 78 y.o.  Referring Physician: Liv Haywood, Debra  Date of Visit: 2023      Certification Dates: 23 through 23    Diagnosis:   1. Acute ischemic left MCA stroke (CMS/HCC)    2. Cerebrovascular accident (CVA), unspecified mechanism (CMS/HCC)        Chief Complaints:  apraxia    Precautions:      TODAY'S VISIT:    Session Time:  Start Time: 1700  Stop Time: 1800  Time Calculation (min): 60 min   History/Vitals/Pain/Encounter Info - 23        Injury History/Precautions/Daily Required Info    Primary Therapist Magnolia Lundy MS,CCC/SLP     Chief Complaint/Reason for Visit  apraxia     Onset of Illness/Injury or Date of Surgery 23     Referring Physician Dr. Haywood     Limitations/Impairments safety/cognitive     Document Type daily treatment     Patient/Family/Caregiver Comments/Observations Pt conitnues to report feeling very tired.     Patient reported fall since last visit No        Pain Assessment    Currently in pain No/Denies                Daily Treatment Assessment and Plan - 23        Daily Treatment Assessment and Plan    Progress toward goals Progressing     Daily Outcome Summary Pt noted with increasing intelligible spontaneous words and phrases.  Confrontational naming improves with direct model for motor production of first phoneme.  Pt resists drill work for Single phonemes and syllables.     Plan and Recommendations Practice functional phrases with X3 successive attemtps with 80% accuracy. Return to naming heirarchy paired with motor production of initial phoneme.                  OBJECTIVE MEASUREMENTS TAKEN TODAY:    None Taken    Today's Treatment:    MOTOR/PRODUCTION SLP FLOW SHEET    SKILL AREA CURRENT SESSION   SPEECH PRODUCTION (MOTOR)  CPT 50325    Pt will produce bilabials /p, b, m, w/, tip alveolars  /t, d, n/ and tense vowels /a, e, I, o,u/ in isolation provided mod A, 80% of trials.  9/28/2023  /p/ (push)   /b/   /m/ 5/5  /w/ 5/5  /b/+vowels: Min A  /m/+vowels: 3/5, Max A for o and u  /w/ +vowels: 1/5    Pt noted with difficulty staying engaged with imitation, direct model work.      9/26/2023  /p/+++-+ +++++  /b/ ++--- ----+  /m/ +++++ +++++  /w/ ++--+ +++++  9/21/2023  /p/+++-+ +++++  /b/ ++--- ----+  /m/ +++++ +++++  /w/ ++--+ +++++    9/6/2023  I can't, wait, are you kidding me?, please, bye  /b/: isolation- with tactile cues  /m/:isolation- with tactile cues  /w/: isolation- with tactile cues.   /p/: isolation following auditory bombardment.   /n/: isolation: with placement cues.  Vowels: 100% with decreased rate and DM. Rapid 80%, 60%    8/25/2023  p- unable  b max A,   m- mod A, frequently substitutes /n/   w- Min A,  /a, e, I, o,u/- min to mod A   Pt will produce VC and CV syllables provided the above phonemes, Mod A, 80% of trials 9/21/023  Mat/map, mop/mop, poop/poop, beam/beam, boom, peep, pa/pom    9/6/2023  /w/ + vowels    8/25/2023  /w/ + vowels: Mod to max A   Pt will produce single and bisyllablic words consisting of CVCV, VCV and CVC, Mod A, 80% of presentations. 9/28/2023  CVC- Direct model and pictures 5/5 9/21/2023  CVCV  Puppy  CVC:    Pt will produce simple automatics provided mod A:  1-10  A-G  Yolis and identifiable syllable combinations for MALLY 9/21/2023  1-11, missed 5&6  1-10, missed 5&8  A-D,   A-E    9/6/2023  Approximated for 70% of 1-10   Pt will produce small set of functional words/phrases in unison or in imitation with SLP, then produce 3  successive attempts with 80% accuracy  9/22/23: Produced 9/14 functional words producing all phonemes. Frustrated throughout.    9/6/2023  Produced 8/14 functional words producing all phonemes.       8/25/2023  Established list of functional words, names. See below.  Pt stimulable for 5/16.  Approximations for remainder.   Noted with  "difficulty with velars, blends in particular.     Auditory Comprehension and reading to be further assessed for determination of goals to support speech production    GOAL: 2023  Given direct model, repetition and NV reinforcement, Pt will follow 1-step commands for motor speech production up to 1-2 syllable words, 80% of presentations.  2023  Pt demonstrated success with 1 syllable words when attending to direct model.  However, Aphasia- Aud Comp continues to be a barrier.     2023  F6 Aud comp moderate: 10/10  F5 hard: 8/10    Readin word phrases- F4 to picture: 6/10  Reading: phrase completion F2. Provided read word 8/10    Naming: Pt benefited from cumulative hints and approximated naming item provided Phrase completion with first phoneme.     2023  Auditory comp,severe impairment:  Aphasia Severity Ratin/5-   All Communication is through fragmentary expression; great need for inference, questioning, and guessing by the listener.  The range of information that can be exchanged is limited, and the listener carries the burden of communication.    Provided pictures/objects, and/or body parts, pt will demonstrate  understanding of \"point to___\" and \" ___\" 85% of trials.  2023  Requires: direct model, repetition NV feedback to place tongue depressor in position for labial proprioception.     23 Pt was able to complete matching and ID words and pictures with 100% accuracy.    Education/Strategy Training  ++----++-+++++ 9/14   h   entire, part of rest.     Other 2023  Pt was able to participate in conversation about Thelma's horses and pt was compliant with practicing target syllables with target phonemes throughout conversation and added to functional word list.  2023  POA reports suspect decline in cognition recently.  Encouraged to report to MD and encourage improving sleep and nutrition routines.      Functional words and names: " 8.25.2023  Dick Montes  Annia  Water    I'm thirsty  I'm fine  Arm hurts  Legs hurts  Call me  I'm tired  Feed Loan.

## 2023-09-28 NOTE — OP OT TREATMENT LOG
OT NEURO FLOW SHEET    EXERCISES  Initial Evaluation  Progress Note 1 CURRENT SESSION  8/16/2023 9/14/2023 TIME   NEURO RE-ED  CPT 54547 TOTAL TIME FOR SESSION 38-52 Minutes   AAROM/AROM     Strengthening      Strength     Gross Motor Control       Fine Motor Control BUE fine motor control with the following tasks:  - sort cards  - grasp/release blocks over barrier  - remove post-its from wall  - wipe up water on tabletop  Patient tolerated well    Right hand grasping exercise concurrent with left hand fine motor control exercise and patient with frequent drops today      Sitting Jennifer/Balance     Standing Jennifer/Balance     Sensory re-education Tactile stim with massage and towel input; patient tolerated well     Retraining Laterality exercise with raising RUE/LUE or both from therapist prompting. Images on computer screen incorporated for additional repetitions and patient with difficulty discriminating L from R today    THERE ACT  CPT 11117 TOTAL TIME FOR SESSION 8-22 Minutes   Pain, Vitals, Meds, Etc. Vitals takenPain assessed, rest breaks provided for frustration management    Assessment     HEP 9/28/23: Laterality exercise added to HEP; patient and caregiver expressed good understanding    Right hand stretching    9/21/2023:   -Educated on use of rice/bean sensory bin, massage, sensory re-education with deep pressure and textures. Issued tubigrip for RUE sensory re-education and advised to wear 1x per day. Continued to discuss edema management.     Functional Mobility     Transfers     THERE EX  CPT 51036 TOTAL TIME FOR SESSION 0-7 Minutes   PROM     Activity Tolerance     SELF-CARE  CPT 80774 TOTAL TIME FOR SESSION Not performed   Pt Education/Safety     ADL Training     IADL Training     MODALITIES  CPT 34803 TOTAL TIME FOR SESSION Not performed   Ice/Heat     ATTENDED E-STIM  CPT 58599 TOTAL TIME FOR SESSION Not performed   Attended E-Stim     SPLINTING  CPT 25319 TOTAL TIME FOR SESSION Not performed    Fabrication/Check Out     Fit     Training     GROUP  CPT 40108 TOTAL TIME FOR SESSION Not performed             Normative Range for Female 75+ Years of Age       Right Hand   Left Hand      Initial Evaluation Progress Note 1 Norm Initial Evaluation Progress Note 1 Norm    Strength 0 lbs 0 lbs 42.6 lbs 30 lbs 30 lbs 37.6 lbs   Lateral Pinch N/T 2 lbs 12.6 lbs N/T 14 lbs 11.4 lbs   Three Jaw Yonatan N/T 2 lbs (assist for position) 12 lbs N/T 11 lbs 11.5 lbs   Tip Pinch N/T N/T 9.6 lbs N/T 9 lbs 9.3 lbs   Box and Block 4 blocks 22 blocks 65 blocks 50 blocks 56 blocks 63.6 blocks   Nine Hole Peg (71+ age) Unable to complete Unable to complete 22.49 seconds N/T 24.55 seconds 24.11 seconds

## 2023-09-28 NOTE — OP SLP TREATMENT LOG
MOTOR/PRODUCTION SLP FLOW SHEET    SKILL AREA CURRENT SESSION   SPEECH PRODUCTION (MOTOR)  CPT 87947    Pt will produce bilabials /p, b, m, w/, tip alveolars /t, d, n/ and tense vowels /a, e, I, o,u/ in isolation provided mod A, 80% of trials.  9/28/2023  /p/ (push)   /b/   /m/ 5/5  /w/ 5/5  /b/+vowels: Min A  /m/+vowels: 3/5, Max A for o and u  /w/ +vowels: 1/5    Pt noted with difficulty staying engaged with imitation, direct model work.      9/26/2023  /p/+++-+ +++++  /b/ ++--- ----+  /m/ +++++ +++++  /w/ ++--+ +++++  9/21/2023  /p/+++-+ +++++  /b/ ++--- ----+  /m/ +++++ +++++  /w/ ++--+ +++++    9/6/2023  I can't, wait, are you kidding me?, please, bye  /b/: isolation- with tactile cues  /m/:isolation- with tactile cues  /w/: isolation- with tactile cues.   /p/: isolation following auditory bombardment.   /n/: isolation: with placement cues.  Vowels: 100% with decreased rate and DM. Rapid 80%, 60%    8/25/2023  p- unable  b max A,   m- mod A, frequently substitutes /n/   w- Min A,  /a, e, I, o,u/- min to mod A   Pt will produce VC and CV syllables provided the above phonemes, Mod A, 80% of trials 9/21/023  Mat/map, mop/mop, poop/poop, beam/beam, boom, peep, pa/pom    9/6/2023  /w/ + vowels    8/25/2023  /w/ + vowels: Mod to max A   Pt will produce single and bisyllablic words consisting of CVCV, VCV and CVC, Mod A, 80% of presentations. 9/28/2023  CVC- Direct model and pictures 5/5    9/21/2023  CVCV  Puppy  CVC:    Pt will produce simple automatics provided mod A:  1-10  A-G  Yolis and identifiable syllable combinations for MALLY 9/21/2023  1-11, missed 5&6  1-10, missed 5&8  A-D,   A-E    9/6/2023  Approximated for 70% of 1-10   Pt will produce small set of functional words/phrases in unison or in imitation with SLP, then produce 3  successive attempts with 80% accuracy  9/22/23: Produced 9/14 functional words producing all phonemes. Frustrated throughout.    9/6/2023  Produced 8/14 functional words producing  "all phonemes.       2023  Established list of functional words, names. See below.  Pt stimulable for .  Approximations for remainder.   Noted with difficulty with velars, blends in particular.     Auditory Comprehension and reading to be further assessed for determination of goals to support speech production    GOAL: 2023  Given direct model, repetition and NV reinforcement, Pt will follow 1-step commands for motor speech production up to 1-2 syllable words, 80% of presentations.  2023  Pt demonstrated success with 1 syllable words when attending to direct model.  However, Aphasia- Aud Comp continues to be a barrier.     2023  F6 Aud comp moderate: 10/10  F5 hard: 8/10    Readin word phrases- F4 to picture: 6/10  Reading: phrase completion F2. Provided read word 8/10    Naming: Pt benefited from cumulative hints and approximated naming item provided Phrase completion with first phoneme.     2023  Auditory comp,severe impairment:  Aphasia Severity Ratin/5-   All Communication is through fragmentary expression; great need for inference, questioning, and guessing by the listener.  The range of information that can be exchanged is limited, and the listener carries the burden of communication.    Provided pictures/objects, and/or body parts, pt will demonstrate  understanding of \"point to___\" and \" ___\" 85% of trials.  2023  Requires: direct model, repetition NV feedback to place tongue depressor in position for labial proprioception.     23 Pt was able to complete matching and ID words and pictures with 100% accuracy.    Education/Strategy Training  ++----++-+++++ 9/14   h   entire, part of rest.     Other 2023  Pt was able to participate in conversation about Thelma's horses and pt was compliant with practicing target syllables with target phonemes throughout conversation and added to functional word list.  2023  POA reports suspect decline in cognition " recently.  Encouraged to report to MD and encourage improving sleep and nutrition routines.      Functional words and names: 8.25.2023  Dick Milner  Water    I'm thirsty  I'm fine  Arm hurts  Legs hurts  Call me  I'm tired  [employer] Loan.

## 2023-10-03 ENCOUNTER — TELEPHONE (OUTPATIENT)
Dept: REGISTRATION | Facility: CLINIC | Age: 78
End: 2023-10-03
Payer: MEDICARE

## 2023-10-05 ENCOUNTER — HOSPITAL ENCOUNTER (OUTPATIENT)
Dept: OCCUPATIONAL THERAPY | Age: 78
Setting detail: THERAPIES SERIES
Discharge: HOME | End: 2023-10-05
Attending: PHYSICAL MEDICINE & REHABILITATION
Payer: MEDICARE

## 2023-10-05 ENCOUNTER — HOSPITAL ENCOUNTER (OUTPATIENT)
Dept: SPEECH THERAPY | Age: 78
Setting detail: THERAPIES SERIES
Discharge: HOME | End: 2023-10-05
Attending: PHYSICAL MEDICINE & REHABILITATION
Payer: MEDICARE

## 2023-10-05 DIAGNOSIS — M62.81 MUSCLE WEAKNESS (GENERALIZED): ICD-10-CM

## 2023-10-05 DIAGNOSIS — I63.9 CEREBROVASCULAR ACCIDENT (CVA), UNSPECIFIED MECHANISM (CMS/HCC): ICD-10-CM

## 2023-10-05 DIAGNOSIS — I63.512 ACUTE ISCHEMIC LEFT MCA STROKE (CMS/HCC): Primary | ICD-10-CM

## 2023-10-05 PROCEDURE — 97535 SELF CARE MNGMENT TRAINING: CPT | Mod: GO

## 2023-10-05 PROCEDURE — 92507 TX SP LANG VOICE COMM INDIV: CPT | Mod: GN

## 2023-10-05 PROCEDURE — 97530 THERAPEUTIC ACTIVITIES: CPT | Mod: GO,59

## 2023-10-05 PROCEDURE — 97760 ORTHOTIC MGMT&TRAING 1ST ENC: CPT | Mod: GO

## 2023-10-05 NOTE — OP OT TREATMENT LOG
OT NEURO FLOW SHEET    EXERCISES  Initial Evaluation  Progress Note 1 CURRENT SESSION  8/16/2023 9/14/2023 TIME   NEURO RE-ED  CPT 75957 TOTAL TIME FOR SESSION Not performed   AAROM/AROM     Strengthening      Strength     Gross Motor Control       Fine Motor Control     Sitting Jennifer/Balance     Standing Jennifer/Balance     Sensory re-education      Retraining     THERE ACT  CPT 22936 TOTAL TIME FOR SESSION 8-22 Minutes   Pain, Vitals, Meds, Etc. Vitals taken, pain assessed, rest breaks provided for frustration management    Assessment     HEP 9/28/23: Laterality exercise added to HEP; patient and caregiver expressed good understanding    Right hand stretching    9/21/2023:   -Educated on use of rice/bean sensory bin, massage, sensory re-education with deep pressure and textures. Issued tubigrip for RUE sensory re-education and advised to wear 1x per day. Continued to discuss edema management.     Functional Mobility     Transfers     THERE EX  CPT 10425 TOTAL TIME FOR SESSION 0-7 Minutes   PROM     Activity Tolerance     SELF-CARE  CPT 56140 TOTAL TIME FOR SESSION 8-22 Minutes   Pt Education/Safety     ADL Training     IADL Training Therapist facilitated practice with opening and applying bandaid x 2 trials with different 2 types of bandages    MODALITIES  CPT 32132 TOTAL TIME FOR SESSION Not performed   Ice/Heat     ATTENDED E-STIM  CPT 15301 TOTAL TIME FOR SESSION Not performed   Attended E-Stim     SPLINTING  CPT 08637 TOTAL TIME FOR SESSION 23-37 Minutes   Fabrication/Check Out Fabricated trigger finger MP blocking splint for right SF    Fit Fit tested today yet patient confused on donning/doffing    Training Training on hold due to time and will be done next session    GROUP  CPT 60545 TOTAL TIME FOR SESSION Not performed             Normative Range for Female 75+ Years of Age       Right Hand   Left Hand      Initial Evaluation Progress Note 1 Norm Initial Evaluation Progress Note 1 Norm    Strength 0  lbs 0 lbs 42.6 lbs 30 lbs 30 lbs 37.6 lbs   Lateral Pinch N/T 2 lbs 12.6 lbs N/T 14 lbs 11.4 lbs   Three Jaw Yonatan N/T 2 lbs (assist for position) 12 lbs N/T 11 lbs 11.5 lbs   Tip Pinch N/T N/T 9.6 lbs N/T 9 lbs 9.3 lbs   Box and Block 4 blocks 22 blocks 65 blocks 50 blocks 56 blocks 63.6 blocks   Nine Hole Peg (71+ age) Unable to complete Unable to complete 22.49 seconds N/T 24.55 seconds 24.11 seconds

## 2023-10-05 NOTE — PROGRESS NOTES
"Speech-Language Pathology Visit      SLP DAILY NOTE FOR OUTPATIENT THERAPY    Patient: Jennifer Sams   MRN: [id number]  : 1945 78 y.o.  Referring Physician: Liv Haywood, Debra  Date of Visit: 10/5/2023      Certification Dates: 23 through 23    Diagnosis:   1. Acute ischemic left MCA stroke (CMS/HCC)    2. Cerebrovascular accident (CVA), unspecified mechanism (CMS/HCC)        Chief Complaints:  apraxia    Precautions:        TODAY'S VISIT:    Session Time:  Start Time: 1508  Stop Time: 1600  Time Calculation (min): 52 min   History/Vitals/Pain/Encounter Info - 10/05/23 1509        Injury History/Precautions/Daily Required Info    Primary Therapist Magnolia Lundy MS,CCC/SLP     Chief Complaint/Reason for Visit  apraxia     Onset of Illness/Injury or Date of Surgery 23     Referring Physician Dr. Haywood     Limitations/Impairments safety/cognitive     Document Type daily treatment     Patient/Family/Caregiver Comments/Observations POA: Thelma, shared that pt has been depressed this week.  Difficulty getting pt to eat and pt stating \"I want to die\".  PRovided Thelma with flyer for [facility] at [place] to attend and listen to others with similar concerns.     Patient reported fall since last visit No        Pain Assessment    Currently in pain No/Denies                Daily Treatment Assessment and Plan - 10/05/23 1509        Daily Treatment Assessment and Plan    Progress toward goals Progressing     Daily Outcome Summary Pt continues to demonstrate difficulty with concept of imitation.  Pt would benefit if pt continued gaze on therapist's lip production/movements.     Plan and Recommendations Practice functional phrases with X3 successive attemtps with 80% accuracy. Return to naming heirarchy paired with motor production of initial phoneme.                  OBJECTIVE MEASUREMENTS TAKEN TODAY:    None Taken    Today's Treatment:    MOTOR/PRODUCTION SLP FLOW SHEET    SKILL " "AREA CURRENT SESSION   SPEECH PRODUCTION (MOTOR)  CPT 17373    Pt will produce bilabials /p, b, m, w/, tip alveolars /t, d, n/ and tense vowels /a, e, I, o,u/ in isolation provided mod A, 80% of trials.  10/5/2023  Difficulty with plosive, aspirated phonemes /p, t/ in particular this date.  Pt does have \"high frequency words\" from list that pt can say clearly and consistently with /p,t/.      Due to some resistance with drill work with isolated phoneme and syllable, pt practiced [employer] flash cards for CV, CVCV, CVC with target bilabials and tip alveolars.     9/28/2023  /p/ (push)   /b/   /m/ 5/5  /w/ 5/5  /b/+vowels: Min A  /m/+vowels: 3/5, Max A for o and u  /w/ +vowels: 1/5    Pt noted with difficulty staying engaged with imitation, direct model work.      9/26/2023  /p/+++-+ +++++  /b/ ++--- ----+  /m/ +++++ +++++  /w/ ++--+ +++++  9/21/2023  /p/+++-+ +++++  /b/ ++--- ----+  /m/ +++++ +++++  /w/ ++--+ +++++    9/6/2023  I can't, wait, are you kidding me?, please, bye  /b/: isolation- with tactile cues  /m/:isolation- with tactile cues  /w/: isolation- with tactile cues.   /p/: isolation following auditory bombardment.   /n/: isolation: with placement cues.  Vowels: 100% with decreased rate and DM. Rapid 80%, 60%    8/25/2023  p- unable  b max A,   m- mod A, frequently substitutes /n/   w- Min A,  /a, e, I, o,u/- min to mod A   Pt will produce VC and CV syllables provided the above phonemes, Mod A, 80% of trials 10/5/2023  Provided flashcard and visual cue No, baez, new, whoa, Mod A    9/21/023  Mat/map, mop/mop, poop/poop, beam/beam, boom, peep, pa/pom    9/6/2023  /w/ + vowels    8/25/2023  /w/ + vowels: Mod to max A   Pt will produce single and bisyllablic words consisting of CVCV, VCV and CVC, Mod A, 80% of presentations. 10/5/2023  peep, pom, map, mop, poop- Mod A  Max A: Bunny, noodle, bottle, puddle, money, meagan    9/28/2023  CVC- Direct model and pictures 5/5 9/21/2023  CVCV  Puppy  CVC:    Pt will " produce simple automatics provided mod A:  1-10  A-G  Yolis and identifiable syllable combinations for MALLY 10/5/2023  1-10 using apraxia ted.    2023  1-11, missed 5&6  1-10, missed 5&8  A-D,   A-E    2023  Approximated for 70% of 1-10   Pt will produce small set of functional words/phrases in unison or in imitation with SLP, then produce 3  successive attempts with 80% accuracy  23: Produced / functional words producing all phonemes. Frustrated throughout.    2023  Produced 8/ functional words producing all phonemes.       2023  Established list of functional words, names. See below.  Pt stimulable for .  Approximations for remainder.   Noted with difficulty with velars, blends in particular.     Auditory Comprehension and reading to be further assessed for determination of goals to support speech production    GOAL: 2023  Given direct model, repetition and NV reinforcement, Pt will follow 1-step commands for motor speech production up to 1-2 syllable words, 80% of presentations.  10/5/2023  Auditory discrimination F6: 7/10, 7/10   Auditory discrim of sentences: F4: 8/10,     Naming with cue alannah, pt repeats words.     2023  Pt demonstrated success with 1 syllable words when attending to direct model.  However, Aphasia- Aud Comp continues to be a barrier.     2023  F6 Aud comp moderate: 10/10  F5 hard: 8/10    Readin word phrases- F4 to picture: 6/10  Reading: phrase completion F2. Provided read word 8/10    Naming: Pt benefited from cumulative hints and approximated naming item provided Phrase completion with first phoneme.     2023  Auditory comp,severe impairment:  Aphasia Severity Ratin/5-   All Communication is through fragmentary expression; great need for inference, questioning, and guessing by the listener.  The range of information that can be exchanged is limited, and the listener carries the burden of communication.    Provided  "pictures/objects, and/or body parts, pt will demonstrate  understanding of \"point to___\" and \" ___\" 85% of trials.  9/6/2023  Requires: direct model, repetition NV feedback to place tongue depressor in position for labial proprioception.     9/22/23 Pt was able to complete matching and ID words and pictures with 100% accuracy.    Education/Strategy Training  ++----++-+++++ 9/14   h  2/7 entire, part of rest.     Other 9/21/2023  Pt was able to participate in conversation about Thelma's horses and pt was compliant with practicing target syllables with target phonemes throughout conversation and added to functional word list.    8/25/2023  POA reports suspect decline in cognition recently.  Encouraged to report to MD and encourage improving sleep and nutrition routines.      Functional words and names: 8.25.2023  Dick Milner  Water    I'm thirsty  I'm fine  Arm hurts  Legs hurts  Call me  I'm tired  Feed Loan.                                  "

## 2023-10-05 NOTE — OP SLP TREATMENT LOG
"MOTOR/PRODUCTION SLP FLOW SHEET    SKILL AREA CURRENT SESSION   SPEECH PRODUCTION (MOTOR)  CPT 57065    Pt will produce bilabials /p, b, m, w/, tip alveolars /t, d, n/ and tense vowels /a, e, I, o,u/ in isolation provided mod A, 80% of trials.  10/5/2023  Difficulty with plosive, aspirated phonemes /p, t/ in particular this date.  Pt does have \"high frequency words\" from list that pt can say clearly and consistently with /p,t/.      Due to some resistance with drill work with isolated phoneme and syllable, pt practiced [employer] flash cards for CV, CVCV, CVC with target bilabials and tip alveolars.     9/28/2023  /p/ (push)   /b/   /m/ 5/5  /w/ 5/5  /b/+vowels: Min A  /m/+vowels: 3/5, Max A for o and u  /w/ +vowels: 1/5    Pt noted with difficulty staying engaged with imitation, direct model work.      9/26/2023  /p/+++-+ +++++  /b/ ++--- ----+  /m/ +++++ +++++  /w/ ++--+ +++++  9/21/2023  /p/+++-+ +++++  /b/ ++--- ----+  /m/ +++++ +++++  /w/ ++--+ +++++    9/6/2023  I can't, wait, are you kidding me?, please, bye  /b/: isolation- with tactile cues  /m/:isolation- with tactile cues  /w/: isolation- with tactile cues.   /p/: isolation following auditory bombardment.   /n/: isolation: with placement cues.  Vowels: 100% with decreased rate and DM. Rapid 80%, 60%    8/25/2023  p- unable  b max A,   m- mod A, frequently substitutes /n/   w- Min A,  /a, e, I, o,u/- min to mod A   Pt will produce VC and CV syllables provided the above phonemes, Mod A, 80% of trials 10/5/2023  Provided flashcard and visual cue No, baez, new, whoa, Mod A    9/21/023  Mat/map, mop/mop, poop/poop, beam/beam, boom, peep, pa/pom    9/6/2023  /w/ + vowels    8/25/2023  /w/ + vowels: Mod to max A   Pt will produce single and bisyllablic words consisting of CVCV, VCV and CVC, Mod A, 80% of presentations. 10/5/2023  peep, pom, map, mop, poop- Mod A  Max A: Bunny, noodle, bottle, puddle, money, meagan    9/28/2023  CVC- Direct model and pictures " 2023  CVCV  Puppy  CVC:    Pt will produce simple automatics provided mod A:  1-10  A-G  Yolis and identifiable syllable combinations for MALLY 10/5/2023  1-10 using apraxia ted.    2023  1-11, missed 5&6  1-10, missed 5&8  A-D,   A-E    2023  Approximated for 70% of 1-10   Pt will produce small set of functional words/phrases in unison or in imitation with SLP, then produce 3  successive attempts with 80% accuracy  23: Produced / functional words producing all phonemes. Frustrated throughout.    2023  Produced 8/ functional words producing all phonemes.       2023  Established list of functional words, names. See below.  Pt stimulable for .  Approximations for remainder.   Noted with difficulty with velars, blends in particular.     Auditory Comprehension and reading to be further assessed for determination of goals to support speech production    GOAL: 2023  Given direct model, repetition and NV reinforcement, Pt will follow 1-step commands for motor speech production up to 1-2 syllable words, 80% of presentations.  10/5/2023  Auditory discrimination F6: 7/10, 7/10   Auditory discrim of sentences: F4: 8/10,     Naming with cue alannah, pt repeats words.     2023  Pt demonstrated success with 1 syllable words when attending to direct model.  However, Aphasia- Aud Comp continues to be a barrier.     2023  F6 Aud comp moderate: 10/10  F5 hard: 8/10    Readin word phrases- F4 to picture: 6/10  Reading: phrase completion F2. Provided read word 8/10    Naming: Pt benefited from cumulative hints and approximated naming item provided Phrase completion with first phoneme.     2023  Auditory comp,severe impairment:  Aphasia Severity Ratin/5-   All Communication is through fragmentary expression; great need for inference, questioning, and guessing by the listener.  The range of information that can be exchanged is limited, and the listener carries the  "burden of communication.    Provided pictures/objects, and/or body parts, pt will demonstrate  understanding of \"point to___\" and \" ___\" 85% of trials.  9/6/2023  Requires: direct model, repetition NV feedback to place tongue depressor in position for labial proprioception.     9/22/23 Pt was able to complete matching and ID words and pictures with 100% accuracy.    Education/Strategy Training  ++----++-+++++ 9/14   h  2/7 entire, part of rest.     Other 9/21/2023  Pt was able to participate in conversation about Thelma's horses and pt was compliant with practicing target syllables with target phonemes throughout conversation and added to functional word list.    8/25/2023  POA reports suspect decline in cognition recently.  Encouraged to report to MD and encourage improving sleep and nutrition routines.      Functional words and names: 8.25.2023  Dick Milner  Water    I'm thirsty  I'm fine  Arm hurts  Legs hurts  Call me  I'm tired  Feed Loan.        "

## 2023-10-05 NOTE — PROGRESS NOTES
Occupational Therapy Visit    OT DAILY NOTE FOR OUTPATIENT THERAPY    Patient: Jennifer Sams   MRN: [id number]  : 1945 78 y.o.  Referring Physician: Liv Haywood, Debra  Date of Visit: 10/5/2023      Certification Dates: 23 through 23    Diagnosis:   1. Acute ischemic left MCA stroke (CMS/HCC)    2. Muscle weakness (generalized)        Chief Complaints:   Brain Injury    Precautions:       TODAY'S VISIT    Time In Session:  Start Time: 1605  Stop Time: 1700  Time Calculation (min): 55 min   History/Vitals/Pain/Encounter Info - 10/05/23 1602        Injury History/Precautions/Daily Required Info    Document Type daily treatment     Chief Complaint/Reason for Visit  Brain Injury     Onset of Illness/Injury or Date of Surgery 23     Referring Physician Dr. Haywood     History of present illness/functional impairment The patient is a 78-year-old  female with a history of AAA, heart block s/p cardiac pacemaker, glaucoma, hyperlipidemia, coronary artery disease, NSTEMI, who presented to [facility] on  after she has not been seen by her friends for a few days.  Patient lives alone and when she was found by EMS she was confused and combative.  In the emergency department she was aphasic with right-sided weakness and hypertensive to the 190 systolic.  CT scan of the head revealed an acute infarct in left MCA with mild bleeding.  Echo showed an EF of 43% and a bubble study was negative.  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  Later this was transitioned to apixaban.  She was continued on statin for secondary prevention.  She required virtual shivani while in the hospital but this was not effective.  She was evaluated by speech therapy and cleared for a soft and bite-size diet with moderately thick liquids.'     Patient/Family/Caregiver Comments/Observations Patient arrives with  caregiver Thelma and reports recent difficulty with managing bandaid application     Patient reported fall since last visit No        Pain Assessment    Currently in pain No/Denies        Pre Activity Vital Signs    /80     BP Location Left upper arm     BP Method Manual     Patient Position Sitting         Services    Do You Speak a Language Other Than English at Home? no                Daily Treatment Assessment and Plan - 10/05/23 1924        Daily Treatment Assessment and Plan    Progress toward goals Progressing     Daily Outcome Summary Patient arrives to OT with caregiver Thelma and with complaint regarding difficulty putting on bandaid today. Explored strategies in the context of impaired functional use of RUE and identified strategies for preparing ahead of time and choosing type of bandaid that is easier to use; patient able to independently yahaira with setup assistance today. Therapist fabricated right SF MP blocking splint for painful triggering at right SF yet patient with difficulty donning today. Next session will focus on proper technique for donning/doffing and additional training to ensure safe and proper use prior to issuance.     Plan and Recommendations Incorporate rehabilitative and compensatory strategies for RUE sensory and motor recovery; frustration management                     OBJECTIVE DATA TAKEN TODAY    None Taken    Today's Treatment:      OT NEURO FLOW SHEET    EXERCISES  Initial Evaluation  Progress Note 1 CURRENT SESSION  8/16/2023 9/14/2023 TIME   NEURO RE-ED  CPT 35787 TOTAL TIME FOR SESSION Not performed   AAROM/AROM     Strengthening      Strength     Gross Motor Control       Fine Motor Control     Sitting Jennifer/Balance     Standing Jennifer/Balance     Sensory re-education      Retraining     THERE ACT  CPT 54740 TOTAL TIME FOR SESSION 8-22 Minutes   Pain, Vitals, Meds, Etc. Vitals taken, pain assessed, rest breaks provided for frustration management    Assessment      HEP 9/28/23: Laterality exercise added to HEP; patient and caregiver expressed good understanding    Right hand stretching    9/21/2023:   -Educated on use of rice/bean sensory bin, massage, sensory re-education with deep pressure and textures. Issued tubigrip for RUE sensory re-education and advised to wear 1x per day. Continued to discuss edema management.     Functional Mobility     Transfers     THERE EX  CPT 75328 TOTAL TIME FOR SESSION 0-7 Minutes   PROM     Activity Tolerance     SELF-CARE  CPT 98211 TOTAL TIME FOR SESSION 8-22 Minutes   Pt Education/Safety     ADL Training     IADL Training Therapist facilitated practice with opening and applying bandaid x 2 trials with different 2 types of bandages    MODALITIES  CPT 32072 TOTAL TIME FOR SESSION Not performed   Ice/Heat     ATTENDED E-STIM  CPT 94863 TOTAL TIME FOR SESSION Not performed   Attended E-Stim     SPLINTING  CPT 47640 TOTAL TIME FOR SESSION 23-37 Minutes   Fabrication/Check Out Fabricated trigger finger MP blocking splint for right SF    Fit Fit tested today yet patient confused on donning/doffing    Training Training on hold due to time and will be done next session    GROUP  CPT 59456 TOTAL TIME FOR SESSION Not performed             Normative Range for Female 75+ Years of Age       Right Hand   Left Hand      Initial Evaluation Progress Note 1 Norm Initial Evaluation Progress Note 1 Norm    Strength 0 lbs 0 lbs 42.6 lbs 30 lbs 30 lbs 37.6 lbs   Lateral Pinch N/T 2 lbs 12.6 lbs N/T 14 lbs 11.4 lbs   Three Jaw Yonatan N/T 2 lbs (assist for position) 12 lbs N/T 11 lbs 11.5 lbs   Tip Pinch N/T N/T 9.6 lbs N/T 9 lbs 9.3 lbs   Box and Block 4 blocks 22 blocks 65 blocks 50 blocks 56 blocks 63.6 blocks   Nine Hole Peg (71+ age) Unable to complete Unable to complete 22.49 seconds N/T 24.55 seconds 24.11 seconds

## 2023-10-10 ENCOUNTER — HOSPITAL ENCOUNTER (OUTPATIENT)
Dept: OCCUPATIONAL THERAPY | Age: 78
Setting detail: THERAPIES SERIES
Discharge: HOME | End: 2023-10-10
Attending: PHYSICAL MEDICINE & REHABILITATION
Payer: MEDICARE

## 2023-10-10 ENCOUNTER — HOSPITAL ENCOUNTER (OUTPATIENT)
Dept: SPEECH THERAPY | Age: 78
Setting detail: THERAPIES SERIES
Discharge: HOME | End: 2023-10-10
Attending: PHYSICAL MEDICINE & REHABILITATION
Payer: MEDICARE

## 2023-10-10 DIAGNOSIS — I63.512 ACUTE ISCHEMIC LEFT MCA STROKE (CMS/HCC): Primary | ICD-10-CM

## 2023-10-10 DIAGNOSIS — R27.9 LACK OF COORDINATION: ICD-10-CM

## 2023-10-10 DIAGNOSIS — I63.9 CEREBROVASCULAR ACCIDENT (CVA), UNSPECIFIED MECHANISM (CMS/HCC): ICD-10-CM

## 2023-10-10 PROCEDURE — 97112 NEUROMUSCULAR REEDUCATION: CPT | Mod: GO,59

## 2023-10-10 PROCEDURE — 97763 ORTHC/PROSTC MGMT SBSQ ENC: CPT | Mod: GO,59

## 2023-10-10 PROCEDURE — 92507 TX SP LANG VOICE COMM INDIV: CPT | Mod: GN

## 2023-10-10 PROCEDURE — 97530 THERAPEUTIC ACTIVITIES: CPT | Mod: GO,59

## 2023-10-10 NOTE — OP OT TREATMENT LOG
OT NEURO FLOW SHEET    EXERCISES  Initial Evaluation  Progress Note 1 CURRENT SESSION  8/16/2023 9/14/2023 TIME   NEURO RE-ED  CPT 33971 TOTAL TIME FOR SESSION 23-37 Minutes   AAROM/AROM     Strengthening Patient participated in wt bearing exercises with bilateral press into therapy ball; she required [place] assist to stabilize right hand on ball     Strength     Gross Motor Control Patient participated in ball rolling exercises for improved gross motor control at RUE and able to produce distal RUE movement to pass ball in multiple directions      Fine Motor Control     Sitting Jennifer/Balance     Standing Jennifer/Balance     Sensory re-education Stereognosis activity for right hand item recognition and sensory re-ed; patient challenged by activity and unable to identify any objects without vision     Retraining     THERE ACT  CPT 09855 TOTAL TIME FOR SESSION 8-22 Minutes   Pain, Vitals, Meds, Etc. Vitals taken, pain assessed, rest breaks provided for frustration management    Assessment     HEP 9/28/23: Laterality exercise added to HEP; patient and caregiver expressed good understanding    Right hand stretching    9/21/2023:   -Educated on use of rice/bean sensory bin, massage, sensory re-education with deep pressure and textures. Issued tubigrip for RUE sensory re-education and advised to wear 1x per day. Continued to discuss edema management.     Functional Mobility     Transfers     THERE EX  CPT 55491 TOTAL TIME FOR SESSION 0-7 Minutes   PROM     Activity Tolerance     SELF-CARE  CPT 65316 TOTAL TIME FOR SESSION 8-22 Minutes   Pt Education/Safety     ADL Training     IADL Training     MODALITIES  CPT 71141 TOTAL TIME FOR SESSION Not performed   Ice/Heat     ATTENDED E-STIM  CPT 75163 TOTAL TIME FOR SESSION Not performed   Attended E-Stim     SPLINTING  CPT 17972 TOTAL TIME FOR SESSION 8-22 Minutes   Fabrication/Check Out     Fit     Training Training provided on donning/doffing right SF MP blocking splint;  patient demonstrates able to yahaira and doff today. Instructions provided on recommended wear schedule, cleaning, and emphasis to monitor skin following use to ensure no irritation; patient caregiver Thelma expressed good understanding    GROUP  CPT 59139 TOTAL TIME FOR SESSION Not performed             Normative Range for Female 75+ Years of Age       Right Hand   Left Hand      Initial Evaluation Progress Note 1 Norm Initial Evaluation Progress Note 1 Norm    Strength 0 lbs 0 lbs 42.6 lbs 30 lbs 30 lbs 37.6 lbs   Lateral Pinch N/T 2 lbs 12.6 lbs N/T 14 lbs 11.4 lbs   Three Jaw Yonatan N/T 2 lbs (assist for position) 12 lbs N/T 11 lbs 11.5 lbs   Tip Pinch N/T N/T 9.6 lbs N/T 9 lbs 9.3 lbs   Box and Block 4 blocks 22 blocks 65 blocks 50 blocks 56 blocks 63.6 blocks   Nine Hole Peg (71+ age) Unable to complete Unable to complete 22.49 seconds N/T 24.55 seconds 24.11 seconds

## 2023-10-10 NOTE — OP SLP TREATMENT LOG
"MOTOR/PRODUCTION SLP FLOW SHEET    SKILL AREA CURRENT SESSION   SPEECH PRODUCTION (MOTOR)  CPT 17507    Pt will produce bilabials /p, b, m, w/, tip alveolars /t, d, n/ and tense vowels /a, e, I, o,u/ in isolation provided mod A, 80% of trials.  10/5/2023  Difficulty with plosive, aspirated phonemes /p, t/ in particular this date.  Pt does have \"high frequency words\" from list that pt can say clearly and consistently with /p,t/.      Due to some resistance with drill work with isolated phoneme and syllable, pt practiced BRIKA flash cards for CV, CVCV, CVC with target bilabials and tip alveolars.     9/28/2023  /p/ (push)   /b/   /m/ 5/5  /w/ 5/5  /b/+vowels: Min A  /m/+vowels: 3/5, Max A for o and u  /w/ +vowels: 1/5    Pt noted with difficulty staying engaged with imitation, direct model work.      9/26/2023  /p/+++-+ +++++  /b/ ++--- ----+  /m/ +++++ +++++  /w/ ++--+ +++++  9/21/2023  /p/+++-+ +++++  /b/ ++--- ----+  /m/ +++++ +++++  /w/ ++--+ +++++    9/6/2023  I can't, wait, are you kidding me?, please, bye  /b/: isolation- with tactile cues  /m/:isolation- with tactile cues  /w/: isolation- with tactile cues.   /p/: isolation following auditory bombardment.   /n/: isolation: with placement cues.  Vowels: 100% with decreased rate and DM. Rapid 80%, 60%    8/25/2023  p- unable  b max A,   m- mod A, frequently substitutes /n/   w- Min A,  /a, e, I, o,u/- min to mod A   Pt will produce VC and CV syllables provided the above phonemes, Mod A, 80% of trials 10/10/2023  Practiced w, p, b, m/ targets in single syllable words.  Pt benefits from mod  To max A to form first phoneme.      10/5/2023  Provided flashcard and visual cue No, baez, new, whoa, Mod A    9/21/023  Mat/map, mop/mop, poop/poop, beam/beam, boom, peep, pa/pom    9/6/2023  /w/ + vowels    8/25/2023  /w/ + vowels: Mod to max A   Pt will produce single and bisyllablic words consisting of CVCV, VCV and CVC, Mod A, 80% of presentations. 10/5/2023  peep, " "pom, map, mop, poop- Mod A  Max A: Bunny, noodle, bottle, puddle, money, meagan    2023  CVC- Direct model and pictures 2023  CVCV  Puppy  CVC:    Pt will produce simple automatics provided mod A:  1-10  A-G  Yolis and identifiable syllable combinations for MALLY 10/5/2023  1-10 using apraxia ted.    2023  1-11, missed 5&6  1-10, missed 5&8  A-D,   A-E    2023  Approximated for 70% of 1-10   Pt will produce small set of functional words/phrases in unison or in imitation with SLP, then produce 3  successive attempts with 80% accuracy  10/10/2023  Pt provided a key ring of 9 functional phrases to practice independently.      23: Produced 9/14 functional words producing all phonemes. Frustrated throughout.    2023  Produced 8/14 functional words producing all phonemes.     2023  Established list of functional words, names. See below.  Pt stimulable for .  Approximations for remainder.   Noted with difficulty with velars, blends in particular.     Auditory Comprehension and reading to be further assessed for determination of goals to support speech production    GOAL: 2023  Given direct model, repetition and NV reinforcement, Pt will follow 1-step commands for motor speech production up to 1-2 syllable words, 80% of presentations.  10/10/2023  Direct model: 'Point to': F6 with repetition.  'Give me', F4 with mod cues. \"Do what I do\" visual cues, direct model.  Pt is adverse to touch and or use of mirror.    10/5/2023  Auditory discrimination F6: 7/10, 7/10   Auditory discrim of sentences: F4: 8/10,     Naming with cue alannah, pt repeats words.     2023  Pt demonstrated success with 1 syllable words when attending to direct model.  However, Aphasia- Aud Comp continues to be a barrier.     2023  F6 Aud comp moderate: 10/10  F5 hard: 8/10    Readin word phrases- F4 to picture: 6/10  Reading: phrase completion F2. Provided read word 8/10    Naming: Pt benefited " "from cumulative hints and approximated naming item provided Phrase completion with first phoneme.     2023  Auditory comp,severe impairment:  Aphasia Severity Ratin/5-   All Communication is through fragmentary expression; great need for inference, questioning, and guessing by the listener.  The range of information that can be exchanged is limited, and the listener carries the burden of communication.    Provided pictures/objects, and/or body parts, pt will demonstrate  understanding of \"point to___\" and \" ___\" 85% of trials.  2023  Requires: direct model, repetition NV feedback to place tongue depressor in position for labial proprioception.     23 Pt was able to complete matching and ID words and pictures with 100% accuracy.    Education/Strategy Training  10/10/2023  Pt was able to match photo to written word in F4 this date.  Stating the words with Max A to produce first phoneme         Other 2023  Pt was able to participate in conversation about Thelma's horses and pt was compliant with practicing target syllables with target phonemes throughout conversation and added to functional word list.    2023  POA reports suspect decline in cognition recently.  Encouraged to report to MD and encourage improving sleep and nutrition routines.      Functional words and names: 2023  Dick Milner  Water    I'm thirsty  I'm fine  Arm hurts  Legs hurts  Call me  I'm tired  Feed Loan.        "

## 2023-10-10 NOTE — PROGRESS NOTES
Occupational Therapy Visit    OT DAILY NOTE FOR OUTPATIENT THERAPY    Patient: Jennifer Sams   MRN: [id number]  : 1945 78 y.o.  Referring Physician: Liv Haywood, Debra  Date of Visit: 10/10/2023      Certification Dates: 23 through 23    Diagnosis:   1. Acute ischemic left MCA stroke (CMS/HCC)    2. Lack of coordination        Chief Complaints:   Brain Injury    Precautions:       TODAY'S VISIT    Time In Session:  Start Time: 1600  Stop Time: 1655  Time Calculation (min): 55 min   History/Vitals/Pain/Encounter Info - 10/10/23 1600        Injury History/Precautions/Daily Required Info    Document Type daily treatment     Chief Complaint/Reason for Visit  Brain Injury     Onset of Illness/Injury or Date of Surgery 23     Referring Physician Dr. Haywood     History of present illness/functional impairment The patient is a 78-year-old  female with a history of AAA, heart block s/p cardiac pacemaker, glaucoma, hyperlipidemia, coronary artery disease, NSTEMI, who presented to [facility] on  after she has not been seen by her friends for a few days.  Patient lives alone and when she was found by EMS she was confused and combative.  In the emergency department she was aphasic with right-sided weakness and hypertensive to the 190 systolic.  CT scan of the head revealed an acute infarct in left MCA with mild bleeding.  Echo showed an EF of 43% and a bubble study was negative.  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  Later this was transitioned to apixaban.  She was continued on statin for secondary prevention.  She required virtual shivani while in the hospital but this was not effective.  She was evaluated by speech therapy and cleared for a soft and bite-size diet with moderately thick liquids.'     Patient/Family/Caregiver Comments/Observations Patient arrives with caregiver  Thelma     Patient reported fall since last visit No        Pain Assessment    Currently in pain No/Denies         Services    Do You Speak a Language Other Than English at Home? no                Daily Treatment Assessment and Plan - 10/10/23 1934        Daily Treatment Assessment and Plan    Progress toward goals Progressing     Daily Outcome Summary Patient seen in OP OT and first part of session focused on training with safe and proper use of custom MP blocking splint for right SF to address painful triggering. Patient able to yahaira and doff today independently and demonstrates effectiveness of splint as evidenced by prevention of triggering when patient performs finger flexion. Patient and caregiver instructed on wear schedule, cleaning recommendations, and skin care recommendation of checking for signs of irritation; caregiver expresses good understanding. Patient challenged today by stereognosis activity as she was unable to identify any items using right hand with vision occluded; activity was downgraded to grasp/release of everyday items with right hand and patient able to grasp spherical and narrow items today, with intermittent drops. Patient demonstrates adequate gross motor control at RUE with large ball rolling activity, and able to direct ball to multiple receivers. She continues to demonstrate frustrated affect that limits her participation in therapy exercises and requires frequent rest breaks to prevent frustration.     Plan and Recommendations Incorporate rehabilitative and compensatory strategies for RUE sensory and motor recovery; frustration management                     OBJECTIVE DATA TAKEN TODAY    None Taken    Today's Treatment:      OT NEURO FLOW SHEET    EXERCISES  Initial Evaluation  Progress Note 1 CURRENT SESSION  8/16/2023 9/14/2023 TIME   NEURO RE-ED  CPT 21105 TOTAL TIME FOR SESSION 23-37 Minutes   AAROM/AROM     Strengthening Patient participated in wt bearing exercises  with bilateral press into therapy ball; she required [place] assist to stabilize right hand on ball     Strength     Gross Motor Control Patient participated in ball rolling exercises for improved gross motor control at RUE and able to produce distal RUE movement to pass ball in multiple directions      Fine Motor Control     Sitting Jennifer/Balance     Standing Jennifer/Balance     Sensory re-education Stereognosis activity for right hand item recognition and sensory re-ed; patient challenged by activity and unable to identify any objects without vision     Retraining     THERE ACT  CPT 64740 TOTAL TIME FOR SESSION 8-22 Minutes   Pain, Vitals, Meds, Etc. Vitals taken, pain assessed, rest breaks provided for frustration management    Assessment     HEP 9/28/23: Laterality exercise added to HEP; patient and caregiver expressed good understanding    Right hand stretching    9/21/2023:   -Educated on use of rice/bean sensory bin, massage, sensory re-education with deep pressure and textures. Issued tubigrip for RUE sensory re-education and advised to wear 1x per day. Continued to discuss edema management.     Functional Mobility     Transfers     THERE EX  CPT 74610 TOTAL TIME FOR SESSION 0-7 Minutes   PROM     Activity Tolerance     SELF-CARE  CPT 51653 TOTAL TIME FOR SESSION 8-22 Minutes   Pt Education/Safety     ADL Training     IADL Training     MODALITIES  CPT 95018 TOTAL TIME FOR SESSION Not performed   Ice/Heat     ATTENDED E-STIM  CPT 81104 TOTAL TIME FOR SESSION Not performed   Attended E-Stim     SPLINTING  CPT 74957 TOTAL TIME FOR SESSION 8-22 Minutes   Fabrication/Check Out     Fit     Training Training provided on donning/doffing right SF MP blocking splint; patient demonstrates able to yahaira and doff today. Instructions provided on recommended wear schedule, cleaning, and emphasis to monitor skin following use to ensure no irritation; patient caregiver Thelma expressed good understanding    GROUP  CPT 23521 TOTAL  TIME FOR SESSION Not performed             Normative Range for Female 75+ Years of Age       Right Hand   Left Hand      Initial Evaluation Progress Note 1 Norm Initial Evaluation Progress Note 1 Norm    Strength 0 lbs 0 lbs 42.6 lbs 30 lbs 30 lbs 37.6 lbs   Lateral Pinch N/T 2 lbs 12.6 lbs N/T 14 lbs 11.4 lbs   Three Jaw Yonatan N/T 2 lbs (assist for position) 12 lbs N/T 11 lbs 11.5 lbs   Tip Pinch N/T N/T 9.6 lbs N/T 9 lbs 9.3 lbs   Box and Block 4 blocks 22 blocks 65 blocks 50 blocks 56 blocks 63.6 blocks   Nine Hole Peg (71+ age) Unable to complete Unable to complete 22.49 seconds N/T 24.55 seconds 24.11 seconds

## 2023-10-10 NOTE — PROGRESS NOTES
Speech-Language Pathology Visit      SLP DAILY NOTE FOR OUTPATIENT THERAPY    Patient: Jennifer Sams   MRN: [id number]  : 1945 78 y.o.  Referring Physician: Liv Haywood, Debra  Date of Visit: 10/10/2023      Certification Dates: 23 through 23    Diagnosis:   1. Acute ischemic left MCA stroke (CMS/HCC)    2. Cerebrovascular accident (CVA), unspecified mechanism (CMS/HCC)        Chief Complaints:  apraxia    Precautions:      TODAY'S VISIT:    Session Time:  Start Time: 1500  Stop Time: 1600  Time Calculation (min): 60 min   History/Vitals/Pain/Encounter Info - 10/10/23 1501        Injury History/Precautions/Daily Required Info    Primary Therapist Magnolia Lundy MS,CCC/SLP     Chief Complaint/Reason for Visit  apraxia     Onset of Illness/Injury or Date of Surgery 23     Referring Physician Dr. Haywood     Limitations/Impairments safety/cognitive     Document Type daily treatment     Patient/Family/Caregiver Comments/Observations Pt reported eyes were sore and dry.  Pt was irritated by this during the sessions.     Patient reported fall since last visit No        Pain Assessment    Currently in pain No/Denies        Pain Interventions    Intervention none     Post Intervention Comments none                Daily Treatment Assessment and Plan - 10/10/23 1501        Daily Treatment Assessment and Plan    Progress toward goals Progressing     Daily Outcome Summary Pt noted with ability to read word this date.  Used reading cues to address, associate first phoneme and engage in imitation.  Pt benfited from target picture held by face of therapist to encourage pt to look for visual cue.     Plan and Recommendations Practice selected functional words with X3 successive attemtps with 80% accuracy. Return to naming heirarchy paired with motor production of initial phoneme. USe reading home exercises to target phoneme bombardment.  Audtiory discrimination tasks.               "    OBJECTIVE MEASUREMENTS TAKEN TODAY:    None Taken    Today's Treatment:    MOTOR/PRODUCTION SLP FLOW SHEET    SKILL AREA CURRENT SESSION   SPEECH PRODUCTION (MOTOR)  CPT 98210    Pt will produce bilabials /p, b, m, w/, tip alveolars /t, d, n/ and tense vowels /a, e, I, o,u/ in isolation provided mod A, 80% of trials.  10/5/2023  Difficulty with plosive, aspirated phonemes /p, t/ in particular this date.  Pt does have \"high frequency words\" from list that pt can say clearly and consistently with /p,t/.      Due to some resistance with drill work with isolated phoneme and syllable, pt practiced Alycia flash cards for CV, CVCV, CVC with target bilabials and tip alveolars.     9/28/2023  /p/ (push)   /b/   /m/ 5/5  /w/ 5/5  /b/+vowels: Min A  /m/+vowels: 3/5, Max A for o and u  /w/ +vowels: 1/5    Pt noted with difficulty staying engaged with imitation, direct model work.      9/26/2023  /p/+++-+ +++++  /b/ ++--- ----+  /m/ +++++ +++++  /w/ ++--+ +++++  9/21/2023  /p/+++-+ +++++  /b/ ++--- ----+  /m/ +++++ +++++  /w/ ++--+ +++++    9/6/2023  I can't, wait, are you kidding me?, please, bye  /b/: isolation- with tactile cues  /m/:isolation- with tactile cues  /w/: isolation- with tactile cues.   /p/: isolation following auditory bombardment.   /n/: isolation: with placement cues.  Vowels: 100% with decreased rate and DM. Rapid 80%, 60%    8/25/2023  p- unable  b max A,   m- mod A, frequently substitutes /n/   w- Min A,  /a, e, I, o,u/- min to mod A   Pt will produce VC and CV syllables provided the above phonemes, Mod A, 80% of trials 10/10/2023  Practiced w, p, b, m/ targets in single syllable words.  Pt benefits from mod  To max A to form first phoneme.      10/5/2023  Provided flashcard and visual cue No, baez, new, whoa, Mod A    9/21/023  Mat/map, mop/mop, poop/poop, beam/beam, boom, peep, pa/pom    9/6/2023  /w/ + vowels    8/25/2023  /w/ + vowels: Mod to max A   Pt will produce single and bisyllablic words " "consisting of CVCV, VCV and CVC, Mod A, 80% of presentations. 10/5/2023  peep, pom, map, mop, poop- Mod A  Max A: Bunny, noodle, bottle, puddle, money, meagan    2023  CVC- Direct model and pictures 2023  CVCV  Puppy  CVC:    Pt will produce simple automatics provided mod A:  1-10  A-G  Yolis and identifiable syllable combinations for MALLY 10/5/2023  1-10 using apraxia ted.    2023  1-11, missed 5&6  1-10, missed 5&8  A-D,   A-E    2023  Approximated for 70% of -10   Pt will produce small set of functional words/phrases in unison or in imitation with SLP, then produce 3  successive attempts with 80% accuracy  10/10/2023  Pt provided a key ring of 9 functional phrases to practice independently.      23: Produced 9/14 functional words producing all phonemes. Frustrated throughout.    2023  Produced 8/14 functional words producing all phonemes.     2023  Established list of functional words, names. See below.  Pt stimulable for .  Approximations for remainder.   Noted with difficulty with velars, blends in particular.     Auditory Comprehension and reading to be further assessed for determination of goals to support speech production    GOAL: 2023  Given direct model, repetition and NV reinforcement, Pt will follow 1-step commands for motor speech production up to 1-2 syllable words, 80% of presentations.  10/10/2023  Direct model: 'Point to': F6 with repetition.  'Give me', F4 with mod cues. \"Do what I do\" visual cues, direct model.  Pt is adverse to touch and or use of mirror.    10/5/2023  Auditory discrimination F6: 7/10, 7/10   Auditory discrim of sentences: F4: 8/10,     Naming with cue alannah, pt repeats words.     2023  Pt demonstrated success with 1 syllable words when attending to direct model.  However, Aphasia- Aud Comp continues to be a barrier.     2023  F6 Aud comp moderate: 10/10  F5 hard: 8/10    Readin word phrases- F4 to picture: " "6/10  Reading: phrase completion F2. Provided read word 8/10    Naming: Pt benefited from cumulative hints and approximated naming item provided Phrase completion with first phoneme.     2023  Auditory comp,severe impairment:  Aphasia Severity Ratin/5-   All Communication is through fragmentary expression; great need for inference, questioning, and guessing by the listener.  The range of information that can be exchanged is limited, and the listener carries the burden of communication.    Provided pictures/objects, and/or body parts, pt will demonstrate  understanding of \"point to___\" and \" ___\" 85% of trials.  2023  Requires: direct model, repetition NV feedback to place tongue depressor in position for labial proprioception.     23 Pt was able to complete matching and ID words and pictures with 100% accuracy.    Education/Strategy Training  10/10/2023  Pt was able to match photo to written word in F4 this date.  Stating the words with Max A to produce first phoneme         Other 2023  Pt was able to participate in conversation about Thelma's horses and pt was compliant with practicing target syllables with target phonemes throughout conversation and added to functional word list.    2023  POA reports suspect decline in cognition recently.  Encouraged to report to MD and encourage improving sleep and nutrition routines.      Functional words and names: 2023  Dick Montes  Annia  Water    I'm thirsty  I'm fine  Arm hurts  Legs hurts  Call me  I'm tired  Feed Loan.                                  "

## 2023-10-12 ENCOUNTER — DOCUMENTATION (OUTPATIENT)
Dept: OCCUPATIONAL THERAPY | Age: 78
End: 2023-10-12
Payer: MEDICARE

## 2023-10-17 ENCOUNTER — HOSPITAL ENCOUNTER (OUTPATIENT)
Dept: OCCUPATIONAL THERAPY | Age: 78
Setting detail: THERAPIES SERIES
Discharge: HOME | End: 2023-10-17
Attending: PHYSICAL MEDICINE & REHABILITATION
Payer: MEDICARE

## 2023-10-17 ENCOUNTER — HOSPITAL ENCOUNTER (OUTPATIENT)
Dept: SPEECH THERAPY | Age: 78
Setting detail: THERAPIES SERIES
Discharge: HOME | End: 2023-10-17
Attending: PHYSICAL MEDICINE & REHABILITATION
Payer: MEDICARE

## 2023-10-17 DIAGNOSIS — I63.512 ACUTE ISCHEMIC LEFT MCA STROKE (CMS/HCC): Primary | ICD-10-CM

## 2023-10-17 DIAGNOSIS — R27.9 LACK OF COORDINATION: ICD-10-CM

## 2023-10-17 DIAGNOSIS — I63.9 CEREBROVASCULAR ACCIDENT (CVA), UNSPECIFIED MECHANISM (CMS/HCC): ICD-10-CM

## 2023-10-17 PROCEDURE — 97112 NEUROMUSCULAR REEDUCATION: CPT | Mod: GO,59

## 2023-10-17 PROCEDURE — 92507 TX SP LANG VOICE COMM INDIV: CPT | Mod: GN,KX

## 2023-10-17 PROCEDURE — 97530 THERAPEUTIC ACTIVITIES: CPT | Mod: GO,59

## 2023-10-17 NOTE — OP OT TREATMENT LOG
OT NEURO FLOW SHEET    EXERCISES  Initial Evaluation  Progress Note 1 CURRENT SESSION  8/16/2023 9/14/2023 TIME   NEURO RE-ED  CPT 46077 TOTAL TIME FOR SESSION 38-52 Minutes   AAROM/AROM     Strengthening      Strength     Gross Motor Control Gross reach and grasp exercise with variety of objects   - Graded up to include use placing object back onto stationary surface and moving surface      Fine Motor Control Tip pinch exercise for handling small game piece    Threading lace through large beads for right hand dexterity and bimanual coordination; patient mainly used LUE but able to stabilize lace with lateral pinch at right hand     Tracing activity on BITS program for improved fine motor control at right hand    Sitting Jennifer/Balance     Standing Jennifer/Balance     Sensory re-education Right hand massage for sensory re-ed     Retraining     THERE ACT  CPT 14672 TOTAL TIME FOR SESSION 8-22 Minutes   Pain, Vitals, Meds, Etc. Rest breaks provided for frustration management    Assessment     HEP Discussed carry over with use of SF splint    9/28/23: Laterality exercise added to HEP; patient and caregiver expressed good understanding    Right hand stretching    9/21/2023:   -Educated on use of rice/bean sensory bin, massage, sensory re-education with deep pressure and textures. Issued tubigrip for RUE sensory re-education and advised to wear 1x per day. Continued to discuss edema management.     Functional Mobility     Transfers     THERE EX  CPT 74316 TOTAL TIME FOR SESSION 0-7 Minutes   PROM     Activity Tolerance     SELF-CARE  CPT 91538 TOTAL TIME FOR SESSION 8-22 Minutes   Pt Education/Safety     ADL Training     IADL Training     MODALITIES  CPT 20023 TOTAL TIME FOR SESSION Not performed   Ice/Heat     ATTENDED E-STIM  CPT 90294 TOTAL TIME FOR SESSION Not performed   Attended E-Stim     SPLINTING  CPT 48400 TOTAL TIME FOR SESSION Not performed   Fabrication/Check Out     Fit     Training     GROUP  CPT 78622  TOTAL TIME FOR SESSION Not performed             Normative Range for Female 75+ Years of Age       Right Hand   Left Hand      Initial Evaluation Progress Note 1 Norm Initial Evaluation Progress Note 1 Norm    Strength 0 lbs 0 lbs 42.6 lbs 30 lbs 30 lbs 37.6 lbs   Lateral Pinch N/T 2 lbs 12.6 lbs N/T 14 lbs 11.4 lbs   Three Jaw Yonatan N/T 2 lbs (assist for position) 12 lbs N/T 11 lbs 11.5 lbs   Tip Pinch N/T N/T 9.6 lbs N/T 9 lbs 9.3 lbs   Box and Block 4 blocks 22 blocks 65 blocks 50 blocks 56 blocks 63.6 blocks   Nine Hole Peg (71+ age) Unable to complete Unable to complete 22.49 seconds N/T 24.55 seconds 24.11 seconds

## 2023-10-17 NOTE — OP SLP TREATMENT LOG
"MOTOR/PRODUCTION SLP FLOW SHEET    SKILL AREA CURRENT SESSION   SPEECH PRODUCTION (MOTOR)  CPT 34268    Pt will produce bilabials /p, b, m, w/, tip alveolars /t, d, n/ and tense vowels /a, e, I, o,u/ in isolation provided mod A, 80% of trials.  10/5/2023  Difficulty with plosive, aspirated phonemes /p, t/ in particular this date.  Pt does have \"high frequency words\" from list that pt can say clearly and consistently with /p,t/.      Due to some resistance with drill work with isolated phoneme and syllable, pt practiced [employer] flash cards for CV, CVCV, CVC with target bilabials and tip alveolars.     9/28/2023  /p/ (push)   /b/   /m/ 5/5  /w/ 5/5  /b/+vowels: Min A  /m/+vowels: 3/5, Max A for o and u  /w/ +vowels: 1/5    Pt noted with difficulty staying engaged with imitation, direct model work.      9/26/2023  /p/+++-+ +++++  /b/ ++--- ----+  /m/ +++++ +++++  /w/ ++--+ +++++  9/21/2023  /p/+++-+ +++++  /b/ ++--- ----+  /m/ +++++ +++++  /w/ ++--+ +++++    9/6/2023  I can't, wait, are you kidding me?, please, bye  /b/: isolation- with tactile cues  /m/:isolation- with tactile cues  /w/: isolation- with tactile cues.   /p/: isolation following auditory bombardment.   /n/: isolation: with placement cues.  Vowels: 100% with decreased rate and DM. Rapid 80%, 60%    8/25/2023  p- unable  b max A,   m- mod A, frequently substitutes /n/   w- Min A,  /a, e, I, o,u/- min to mod A   Pt will produce VC and CV syllables provided the above phonemes, Mod A, 80% of trials 10/10/2023  Practiced w, p, b, m/ targets in single syllable words.  Pt benefits from mod  To max A to form first phoneme.      10/5/2023  Provided flashcard and visual cue No, baez, new, whoa, Mod A    9/21/023  Mat/map, mop/mop, poop/poop, beam/beam, boom, peep, pa/pom    9/6/2023  /w/ + vowels    8/25/2023  /w/ + vowels: Mod to max A   Pt will produce single and bisyllablic words consisting of CVCV, VCV and CVC, Mod A, 80% of presentations. 10/5/2023  peep, " "pom, map, mop, poop- Mod A  Max A: Bunny, noodle, bottle, puddle, money, meagan    2023  CVC- Direct model and pictures 2023  CVCV  Puppy  CVC:    Pt will produce simple automatics provided mod A:  1-10  A-G  Yolis and identifiable syllable combinations for MALLY 10/5/2023  1-10 using apraxia ted.    2023  1-11, missed 5&6  1-10, missed 5&8  A-D,   A-E    2023  Approximated for 70% of 1-10   Pt will produce small set of functional words/phrases in unison or in imitation with SLP, then produce 3  successive attempts with 80% accuracy  10/10/2023  Pt provided a key ring of 9 functional phrases to practice independently.      23: Produced 9/14 functional words producing all phonemes. Frustrated throughout.    2023  Produced 8/14 functional words producing all phonemes.     2023  Established list of functional words, names. See below.  Pt stimulable for .  Approximations for remainder.   Noted with difficulty with velars, blends in particular.     Auditory Comprehension and reading to be further assessed for determination of goals to support speech production    GOAL: 2023  Given direct model, repetition and NV reinforcement, Pt will follow 1-step commands for motor speech production up to 1-2 syllable words, 80% of presentations.  10/10/2023  Direct model: 'Point to': F6 with repetition.  'Give me', F4 with mod cues. \"Do what I do\" visual cues, direct model.  Pt is adverse to touch and or use of mirror.    10/5/2023  Auditory discrimination F6: 7/10, 7/10   Auditory discrim of sentences: F4: 8/10,     Naming with cue alannah, pt repeats words.     2023  Pt demonstrated success with 1 syllable words when attending to direct model.  However, Aphasia- Aud Comp continues to be a barrier.     2023  F6 Aud comp moderate: 10/10  F5 hard: 8/10    Readin word phrases- F4 to picture: 6/10  Reading: phrase completion F2. Provided read word 8/10    Naming: Pt benefited " "from cumulative hints and approximated naming item provided Phrase completion with first phoneme.     2023  Auditory comp,severe impairment:  Aphasia Severity Ratin/5-   All Communication is through fragmentary expression; great need for inference, questioning, and guessing by the listener.  The range of information that can be exchanged is limited, and the listener carries the burden of communication.    Provided pictures/objects, and/or body parts, pt will demonstrate  understanding of \"point to___\" and \" ___\" 85% of trials.  2023  Requires: direct model, repetition NV feedback to place tongue depressor in position for labial proprioception.     23 Pt was able to complete matching and ID words and pictures with 100% accuracy.    Education/Strategy Training  10/10/2023  Pt was able to match photo to written word in F4 this date.  Stating the words with Max A to produce first phoneme         Other 2023  Pt was able to participate in conversation about Thelma's horses and pt was compliant with practicing target syllables with target phonemes throughout conversation and added to functional word list.    2023  POA reports suspect decline in cognition recently.  Encouraged to report to MD and encourage improving sleep and nutrition routines.      Functional words and names: 2023  Dick Milner  Water    I'm thirsty  I'm fine  Arm hurts  Legs hurts  Call me  I'm tired  Feed Loan.        "

## 2023-10-17 NOTE — OP SLP TREATMENT LOG
"MOTOR/PRODUCTION SLP FLOW SHEET    SKILL AREA CURRENT SESSION   SPEECH PRODUCTION (MOTOR)  CPT 71435    Pt will produce bilabials /p, b, m, w/, tip alveolars /t, d, n/ and tense vowels /a, e, I, o,u/ in isolation provided mod A, 80% of trials.  10/5/2023  Difficulty with plosive, aspirated phonemes /p, t/ in particular this date.  Pt does have \"high frequency words\" from list that pt can say clearly and consistently with /p,t/.      Due to some resistance with drill work with isolated phoneme and syllable, pt practiced [employer] flash cards for CV, CVCV, CVC with target bilabials and tip alveolars.     9/28/2023  /p/ (push)   /b/   /m/ 5/5  /w/ 5/5  /b/+vowels: Min A  /m/+vowels: 3/5, Max A for o and u  /w/ +vowels: 1/5    Pt noted with difficulty staying engaged with imitation, direct model work.      9/26/2023  /p/+++-+ +++++  /b/ ++--- ----+  /m/ +++++ +++++  /w/ ++--+ +++++  9/21/2023  /p/+++-+ +++++  /b/ ++--- ----+  /m/ +++++ +++++  /w/ ++--+ +++++    9/6/2023  I can't, wait, are you kidding me?, please, bye  /b/: isolation- with tactile cues  /m/:isolation- with tactile cues  /w/: isolation- with tactile cues.   /p/: isolation following auditory bombardment.   /n/: isolation: with placement cues.  Vowels: 100% with decreased rate and DM. Rapid 80%, 60%    8/25/2023  p- unable  b max A,   m- mod A, frequently substitutes /n/   w- Min A,  /a, e, I, o,u/- min to mod A   Pt will produce VC and CV syllables provided the above phonemes, Mod A, 80% of trials 10/10/2023  Practiced w, p, b, m/ targets in single syllable words.  Pt benefits from mod  To max A to form first phoneme.      10/5/2023  Provided flashcard and visual cue No, beaz, new, whoa, Mod A    9/21/023  Mat/map, mop/mop, poop/poop, beam/beam, boom, peep, pa/pom    9/6/2023  /w/ + vowels    8/25/2023  /w/ + vowels: Mod to max A   Pt will produce single and bisyllablic words consisting of CVCV, VCV and CVC, Mod A, 80% of presentations. 10/5/2023  peep, " "pom, map, mop, poop- Mod A  Max A: Bunny, noodle, bottle, puddle, money, meagan    2023  CVC- Direct model and pictures 2023  CVCV  Puppy  CVC:    Pt will produce simple automatics provided mod A:  1-10  A-G  Yolis and identifiable syllable combinations for MALLY 10/5/2023  1-10 using apraxia ted.    2023  1-11, missed 5&6  1-10, missed 5&8  A-D,   A-E    2023  Approximated for 70% of 1-10   Pt will produce small set of functional words/phrases in unison or in imitation with SLP, then produce 3  successive attempts with 80% accuracy  10/10/2023  Pt provided a key ring of 9 functional phrases to practice independently.      23: Produced 9/14 functional words producing all phonemes. Frustrated throughout.    2023  Produced 8/14 functional words producing all phonemes.     2023  Established list of functional words, names. See below.  Pt stimulable for .  Approximations for remainder.   Noted with difficulty with velars, blends in particular.     Auditory Comprehension and reading to be further assessed for determination of goals to support speech production    GOAL: 2023  Given direct model, repetition and NV reinforcement, Pt will follow 1-step commands for motor speech production up to 1-2 syllable words, 80% of presentations.  10/10/2023  Direct model: 'Point to': F6 with repetition.  'Give me', F4 with mod cues. \"Do what I do\" visual cues, direct model.  Pt is adverse to touch and or use of mirror.    10/5/2023  Auditory discrimination F6: 7/10, 7/10   Auditory discrim of sentences: F4: 8/10,     Naming with cue alannah, pt repeats words.     2023  Pt demonstrated success with 1 syllable words when attending to direct model.  However, Aphasia- Aud Comp continues to be a barrier.     2023  F6 Aud comp moderate: 10/10  F5 hard: 8/10    Readin word phrases- F4 to picture: 6/10  Reading: phrase completion F2. Provided read word 8/10    Naming: Pt benefited " "from cumulative hints and approximated naming item provided Phrase completion with first phoneme.     2023  Auditory comp,severe impairment:  Aphasia Severity Ratin/5-   All Communication is through fragmentary expression; great need for inference, questioning, and guessing by the listener.  The range of information that can be exchanged is limited, and the listener carries the burden of communication.    Provided pictures/objects, and/or body parts, pt will demonstrate  understanding of \"point to___\" and \" ___\" 85% of trials.  2023  Requires: direct model, repetition NV feedback to place tongue depressor in position for labial proprioception.     23 Pt was able to complete matching and ID words and pictures with 100% accuracy.    Education/Strategy Training  10/10/2023  Pt was able to match photo to written word in F4 this date.  Stating the words with Max A to produce first phoneme         Other 2023  Pt was able to participate in conversation about Thelma's horses and pt was compliant with practicing target syllables with target phonemes throughout conversation and added to functional word list.    2023  POA reports suspect decline in cognition recently.  Encouraged to report to MD and encourage improving sleep and nutrition routines.      Functional words and names: 2023  Dick+++    Loan +++ Frieda  Hi+++  Help+++ Brown+++  Bye+++  Arnol  Sharlene+++  Hungry  Pat+++  Ryan+++  Pasta  Annia  Water++-   I'm thirsty  I'm fine  Arm hurts  Legs hurts  Call me  I'm tired  Feed Loan.        "

## 2023-10-17 NOTE — PROGRESS NOTES
Occupational Therapy Visit    OT DAILY NOTE FOR OUTPATIENT THERAPY    Patient: Jennifer Sams   MRN: [id number]  : 1945 78 y.o.  Referring Physician: Liv Haywood, Debra  Date of Visit: 10/17/2023      Certification Dates: 23 through 23    Diagnosis:   1. Acute ischemic left MCA stroke (CMS/HCC)    2. Lack of coordination        Chief Complaints:   Brain Injury    Precautions:       TODAY'S VISIT    Time In Session:  Start Time: 1600  Stop Time: 1655  Time Calculation (min): 55 min   History/Vitals/Pain/Encounter Info - 10/17/23 1930        Injury History/Precautions/Daily Required Info    Document Type daily treatment     Chief Complaint/Reason for Visit  Brain Injury     Onset of Illness/Injury or Date of Surgery 23     Referring Physician Dr. Haywood     History of present illness/functional impairment The patient is a 78-year-old  female with a history of AAA, heart block s/p cardiac pacemaker, glaucoma, hyperlipidemia, coronary artery disease, NSTEMI, who presented to [facility] on  after she has not been seen by her friends for a few days.  Patient lives alone and when she was found by EMS she was confused and combative.  In the emergency department she was aphasic with right-sided weakness and hypertensive to the 190 systolic.  CT scan of the head revealed an acute infarct in left MCA with mild bleeding.  Echo showed an EF of 43% and a bubble study was negative.  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  Later this was transitioned to apixaban.  She was continued on statin for secondary prevention.  She required virtual shivani while in the hospital but this was not effective.  She was evaluated by speech therapy and cleared for a soft and bite-size diet with moderately thick liquids.'     Patient/Family/Caregiver Comments/Observations Patient arrives with caregiver  Thelma who reports inconsistent wear of right SF orthosis; she denies poor tolerance but does not appear motivated to wear consistently at this time.     Patient reported fall since last visit No        Pain Assessment    Currently in pain No/Denies         Services    Do You Speak a Language Other Than English at Home? no                Daily Treatment Assessment and Plan - 10/17/23 1930        Daily Treatment Assessment and Plan    Progress toward goals Progressing     Daily Outcome Summary Patient with improved engagement on task today as compared to previous session and improved coordianation at right hand with fine and gross motor tasks. Patient required min verbal and visual cueing today for grasp and release activity and able to progress to harder challenges including placing on moving target. Patient able to coordinate BUE together to bead full string today with improvement in performance seen with prolonged engagement on task. She demonstrates adequate motor control to complete tracing activity with right IF on BITS board with high accuracy (89.5%). She is progressing with functional use of RUE at this time.     Plan and Recommendations Incorporate rehabilitative and compensatory strategies for RUE sensory and motor recovery; frustration management                     OBJECTIVE DATA TAKEN TODAY    None Taken    Today's Treatment:      OT NEURO FLOW SHEET    EXERCISES  Initial Evaluation  Progress Note 1 CURRENT SESSION  8/16/2023 9/14/2023 TIME   NEURO RE-ED  CPT 91152 TOTAL TIME FOR SESSION 38-52 Minutes   AAROM/AROM     Strengthening      Strength     Gross Motor Control Gross reach and grasp exercise with variety of objects   - Graded up to include use placing object back onto stationary surface and moving surface      Fine Motor Control Tip pinch exercise for handling small game piece    Threading lace through large beads for right hand dexterity and bimanual coordination; patient mainly  used LUE but able to stabilize lace with lateral pinch at right hand     Tracing activity on BITS program for improved fine motor control at right hand    Sitting Jennifer/Balance     Standing Jennifer/Balance     Sensory re-education Right hand massage for sensory re-ed     Retraining     THERE ACT  CPT 65546 TOTAL TIME FOR SESSION 8-22 Minutes   Pain, Vitals, Meds, Etc. Rest breaks provided for frustration management    Assessment     HEP Discussed carry over with use of SF splint    9/28/23: Laterality exercise added to HEP; patient and caregiver expressed good understanding    Right hand stretching    9/21/2023:   -Educated on use of rice/bean sensory bin, massage, sensory re-education with deep pressure and textures. Issued tubigrip for RUE sensory re-education and advised to wear 1x per day. Continued to discuss edema management.     Functional Mobility     Transfers     THERE EX  CPT 09015 TOTAL TIME FOR SESSION 0-7 Minutes   PROM     Activity Tolerance     SELF-CARE  CPT 81584 TOTAL TIME FOR SESSION 8-22 Minutes   Pt Education/Safety     ADL Training     IADL Training     MODALITIES  CPT 53249 TOTAL TIME FOR SESSION Not performed   Ice/Heat     ATTENDED E-STIM  CPT 92107 TOTAL TIME FOR SESSION Not performed   Attended E-Stim     SPLINTING  CPT 23673 TOTAL TIME FOR SESSION Not performed   Fabrication/Check Out     Fit     Training     GROUP  CPT 14020 TOTAL TIME FOR SESSION Not performed             Normative Range for Female 75+ Years of Age       Right Hand   Left Hand      Initial Evaluation Progress Note 1 Norm Initial Evaluation Progress Note 1 Norm    Strength 0 lbs 0 lbs 42.6 lbs 30 lbs 30 lbs 37.6 lbs   Lateral Pinch N/T 2 lbs 12.6 lbs N/T 14 lbs 11.4 lbs   Three Jaw Yonatan N/T 2 lbs (assist for position) 12 lbs N/T 11 lbs 11.5 lbs   Tip Pinch N/T N/T 9.6 lbs N/T 9 lbs 9.3 lbs   Box and Block 4 blocks 22 blocks 65 blocks 50 blocks 56 blocks 63.6 blocks   Nine Hole Peg (71+ age) Unable to complete Unable to  complete 22.49 seconds N/T 24.55 seconds 24.11 seconds

## 2023-10-18 NOTE — OP SLP TREATMENT LOG
"MOTOR/PRODUCTION SLP FLOW SHEET    SKILL AREA CURRENT SESSION   SPEECH PRODUCTION (MOTOR)  CPT 12003    Pt will produce bilabials /p, b, m, w/, tip alveolars /t, d, n/ and tense vowels /a, e, I, o,u/ in isolation provided mod A, 80% of trials.  PN: 10/17/2023  Partially Met   Pt will produce VC and CV syllables provided the above phonemes, Mod A, 80% of trials PN: 10/17/2023  Partially Met   Pt will produce single and bisyllablic words consisting of CVCV, VCV and CVC, Mod A, 80% of presentations. PN: 10/17/2023  Partially Met   Pt will produce simple automatics provided mod A:  1-10  A-G  Yolis and identifiable syllable combinations for MALLY PN: 10/17/2023  Continue  10/5/2023  1-10 using apraxia ted.     9/21/2023  1-11, missed 5&6  1-10, missed 5&8  A-D,   A-E    9/6/2023  Approximated for 70% of 1-10   Pt will produce small set of functional words/phrases in unison or in imitation with SLP, then produce 3  successive attempts with 80% accuracy  PN: 10/17/2023  Continue    10/10/2023  Pt provided a key ring of 9 functional phrases to practice independently.      9/22/23: Produced 9/14 functional words producing all phonemes. Frustrated throughout.    9/6/2023  Produced 8/14 functional words producing all phonemes.     8/25/2023  Established list of functional words, names. See below.  Pt stimulable for 5/16.  Approximations for remainder.   Noted with difficulty with velars, blends in particular.     GOAL: 8/25/2023  Given direct model, repetition and NV reinforcement, Pt will follow 1-step commands for motor speech production up to 1-2 syllable words, 80% of presentations.     New: 10/17/2023  The pt will demonstrate understanding of 1 noun in simple and present and progressive sentences F4, 80% accy.   (Advanced language L1-2 Identify)  10/17/2023- MET/Revise   Provided pictures/objects, and/or body parts, pt will demonstrate  understanding of \"point to___\" 85% of trials.     Revise: Pictures, letters identified " by phoneme and letter.      PN: 10/17/2023    Pictures and objects, F6: 70%. F4: 80%   NEW:   Pt will answer Y/N questions to general knowledge questions 90% of presentations.  PN: 10/17/2023  New   Education/Strategy Training  10/10/2023  Pt was able to match photo to written word in F4 this date.  Stating the words with Max A to produce first phoneme     Other 9/21/2023  Pt was able to participate in conversation about Thelma's horses and pt was compliant with practicing target syllables with target phonemes throughout conversation and added to functional word list.    8/25/2023  POA reports suspect decline in cognition recently.  Encouraged to report to MD and encourage improving sleep and nutrition routines.      Functional words and names: 8.25.2023  Dick Milner  Water   I'm thirsty  I'm fine  Arm hurts  Legs hurts  Call me  I'm tired  Samuel Cates.

## 2023-10-20 NOTE — PROGRESS NOTES
Speech-Language Pathology Progress Note      SLP PROGRESS NOTE FOR OUTPATIENT THERAPY    Patient: Jennifer Sams MRN: [id number]  : 1945 78 y.o.  Referring Physician: Liv Haywood, Debra  Date of Visit: 10/17/2023      Certification Dates: 23 through 23    Recommended Frequency & Duration:  2 times/week for up to 3 months   DC/RE:2023    Diagnosis:   1. Acute ischemic left MCA stroke (CMS/HCC)    2. Cerebrovascular accident (CVA), unspecified mechanism (CMS/HCC)        Chief Complaints:  Chief Complaint   Patient presents with    Speech Problem     Receptive/Expressive Aphasia, Motor Speech Apraxia       Precautions:        TODAY'S VISIT:    Session Time:  Start Time: 1510  Stop Time: 1600  Time Calculation (min): 50 min   General Information - 10/17/23 1510        Session Details    Document Type progress note     Mode of Treatment individual therapy        General Information    Onset of Illness/Injury or Date of Surgery 23     Referring Physician Dr. Haywood     Limitations/Impairments safety/cognitive     Patient/Family/Caregiver Comments/Observations Pt continues to have difficulty exercising imitation for repeated motor speech production.                Daily Falls Screen - 10/17/23 1510        Daily Falls Assessment    Patient reported fall since last visit No                Pain and Vitals - 10/17/23 1510        Pain Assessment    Currently in pain No/Denies                SLP - 10/17/23 1510        Speech Therapy    Speech Therapy Specialty Traditional Neuro [facility]        SLP Frequency and Duration    Frequency of treatment 2 times/week     Speech Duration 3 months     SLP Custom Frequency and Duration DC/RE:2023     SLP Cert From 23     SLP Cert To 23     Date SLP POC was sent to provider 23     Signed SLP Plan of Care received?  Yes                Assessment - 10/17/23 6211        Assessment    Plan of Care reviewed and  patient/family in agreement Yes     Comments Addition of Language comprehension goals.     Clinical Assessment  has attended 11 sessions of speech and language therapy. Ms. Sams attends with her POA who verbalizes and demonstrates understandings of applied theories of speech and language rehabilitation.  POA and SLP recognize that although motor speech does continue to improve, present levels of Language comprehension impede maximal progression of motor speech.  Areas of improvement of motor speech include improved consistency of bilabial and tip-alveolar phonemes in initial position of syllables and 1-2 syllable words.  Syllables that lack meaning,often targeted for rapid/sequential articulated production, often result in frustration for the patient because the uncertainty of meaning or reasoning.  Due to impact of Auditory comprehension on progression of motor speech, additional goals have been developed for auditory comprehension.  Ms. Sams will benefit from continued speech and language therapy to target motor speech and auditory comprehension goals equally.     Plan and Recommendations Continue to add functional words, particularly with bilabials and tip alveolars to word bank.  Initiate ne AC goals.     Planned Services CPT 70522 Speech Lang Voice Comm and/or Auditory Proc (Treatment of speech, language, voice, communication and/or hearing processing disorder)                 OBJECTIVE MEASUREMENTS/DATA:    None Taken    Outcome Measures        8/16/2023    17:42 8/25/2023    16:57   SLP Outcome Measures   FCM: Motor Speech 2-->Level 2       Goal L4    FCM: Reading 3-->Level 3       Goal: maintain 3, indirect therapy, improve to 4.    FCM: Spoken Language, Comprehension --        TBD, suspect L2, goal, indirect therapy-4 2-->Level 2   FCM: Spoken Language, Expression 2-->Level 2        Today's Treatment::     Education provided:  Yes: See treatment log for details of education provided  Methods:  Discussion, Handout and Demonstration  Readiness: acceptance  Response: Needs reinforcement    MOTOR/PRODUCTION SLP FLOW SHEET    SKILL AREA CURRENT SESSION   SPEECH PRODUCTION (MOTOR)  CPT 43433    Pt will produce bilabials /p, b, m, w/, tip alveolars /t, d, n/ and tense vowels /a, e, I, o,u/ in isolation provided mod A, 80% of trials.  PN: 10/17/2023  Partially Met   Pt will produce VC and CV syllables provided the above phonemes, Mod A, 80% of trials PN: 10/17/2023  Partially Met   Pt will produce single and bisyllablic words consisting of CVCV, VCV and CVC, Mod A, 80% of presentations. PN: 10/17/2023  Partially Met   Pt will produce simple automatics provided mod A:  1-10  A-G  Yolis and identifiable syllable combinations for MALLY PN: 10/17/2023  Continue  10/5/2023  1-10 using apraxia ted.     9/21/2023  1-11, missed 5&6  1-10, missed 5&8  A-D,   A-E    9/6/2023  Approximated for 70% of 1-10   Pt will produce small set of functional words/phrases in unison or in imitation with SLP, then produce 3  successive attempts with 80% accuracy  PN: 10/17/2023  Continue    10/10/2023  Pt provided a key ring of 9 functional phrases to practice independently.      9/22/23: Produced 9/14 functional words producing all phonemes. Frustrated throughout.    9/6/2023  Produced 8/14 functional words producing all phonemes.     8/25/2023  Established list of functional words, names. See below.  Pt stimulable for 5/16.  Approximations for remainder.   Noted with difficulty with velars, blends in particular.     GOAL: 8/25/2023  Given direct model, repetition and NV reinforcement, Pt will follow 1-step commands for motor speech production up to 1-2 syllable words, 80% of presentations.     New: 10/17/2023  The pt will demonstrate understanding of 1 noun in simple and present and progressive sentences F4, 80% accy.   (Advanced language L1-2 Identify)  10/17/2023- MET/Revise   Provided pictures/objects, and/or body parts, pt will  "demonstrate  understanding of \"point to___\" 85% of trials.     Revise: Pictures, letters identified by phoneme and letter.      PN: 10/17/2023    Pictures and objects, F6: 70%. F4: 80%   NEW:   Pt will answer Y/N questions to general knowledge questions 90% of presentations.  PN: 10/17/2023  New   Education/Strategy Training  10/10/2023  Pt was able to match photo to written word in F4 this date.  Stating the words with Max A to produce first phoneme     Other 9/21/2023  Pt was able to participate in conversation about Thelma's horses and pt was compliant with practicing target syllables with target phonemes throughout conversation and added to functional word list.    8/25/2023  POA reports suspect decline in cognition recently.  Encouraged to report to MD and encourage improving sleep and nutrition routines.      Functional words and names: 8.25.2023  Dick Barnett  Pat  Ryan  Pasta  Annia  Water   I'm thirsty  I'm fine  Arm hurts  Legs hurts  Call me  I'm tired  Feed Loan.             Goals Addressed                 This Visit's Progress     SLP Motor Goals           Time Frame  Result  Comment/Progress    Will improve FCM in the following:  MOTOR SPEECH  Current:3  Goal:4-5  SPOKEN LANG COMP  Current:2  Goal: 2-3 LTG- 12 w     Demonstrate effective speech intelligibility to communicate at single word and functional phrase level with (min A) use of compensatory strategies with familiar listeners  LTG- 12 w     Pt will produce bilabials /p, b, m, w/, tip alveolars /t, d, n/ and tense vowels /a, e, I, o,u/ in isolation provided mod A, 80% of trials.  STG-4w   Partially Met  PN: 10/17/2023  Isolation.  Provided direct model with visual cues pt can produce, but inconsistent due to difficulty understanding need to imitate.    Pt will produce VC and CV syllables provided the above phonemes, Mod A, 80% of trials STG- 6-8 w   Partially Met  PN: 10/17/2023  Benefits from " "visual picture, direct model and visual cues.    Pt will produce single and bisyllablic words consisting of CVCV, VCV and CVC, Mod A, 80% of presentations.  STG 8-10   Partially Met  PN: 10/17/2023  Benefits from visual picture, direct model and visual cues.   Pt will produce simple automatics provided mod A:  1-10  A-G  Yolis and identifiable syllable combinations for MALLY  STG- 8-10  continue  PN: 10/17/2023  1-10, 70-82% mod A  A-Z: 19%      Pt will produce small set of functional words/phrases in unison or in imitation with SLP, then produce 3  successive attempts with 80% accuracy  STG 8-10   continue  PN: 10/17/2023  75% provided DM, fade cues.  Improving with reading from flash cards   Auditory Comprehension and reading to be further assessed for determination of goals to support speech production    GOAL: 8/25/2023  Given direct model, repetition and NV reinforcement, Pt will follow 1-step commands for motor speech production up to 1-2 syllable words, 80% of presentations.     The pt will demonstrate understanding of 1 noun in simple and present and progressive sentences F4, 80% accy.   (Advanced language L1-2 Identify)                 4-6 w              MET          MET            New  10/17/23              PN: 10/17/2023   GOAL: 8/25/2023  Provided pictures/objects, and/or body parts, pt will demonstrate  understanding of \"point to___\" and \" ___\" 85% of trials.     8 w        Continue          PN: 10/17/2023  Pictures and objects, F6:70%.   F4:80%   NEW:   Pt will answer Y/N questions to general knowledge questions 90% of presentations.  New: 10/17/2023 New      FCM: MOTOR SPEECH:  L3:  The communication partner must assume primary responsibility for interpreting the communication exchange, however the individual is able to produce short consonant-vowel combinations or automatic words intelligibly. With consistent MODERATE cueing, the individual can produce simple words and phrases intelligibly, " although accuracy may vary.       FCM: Spoken Language Comprehension  L2: With consistent, maximal cues, the individual is able to follow simple directions, respond to simple yes/no, questions in context, and respond to simple words or phrases related to personal needs.

## 2023-10-24 ENCOUNTER — HOSPITAL ENCOUNTER (OUTPATIENT)
Dept: SPEECH THERAPY | Age: 78
Setting detail: THERAPIES SERIES
Discharge: HOME | End: 2023-10-24
Attending: PHYSICAL MEDICINE & REHABILITATION
Payer: MEDICARE

## 2023-10-24 ENCOUNTER — HOSPITAL ENCOUNTER (OUTPATIENT)
Dept: OCCUPATIONAL THERAPY | Age: 78
Setting detail: THERAPIES SERIES
Discharge: HOME | End: 2023-10-24
Attending: PHYSICAL MEDICINE & REHABILITATION
Payer: MEDICARE

## 2023-10-24 DIAGNOSIS — I63.512 ACUTE ISCHEMIC LEFT MCA STROKE (CMS/HCC): Primary | ICD-10-CM

## 2023-10-24 DIAGNOSIS — I63.9 CEREBROVASCULAR ACCIDENT (CVA), UNSPECIFIED MECHANISM (CMS/HCC): Primary | ICD-10-CM

## 2023-10-24 DIAGNOSIS — R27.9 LACK OF COORDINATION: ICD-10-CM

## 2023-10-24 PROCEDURE — 97112 NEUROMUSCULAR REEDUCATION: CPT | Mod: GO,59

## 2023-10-24 PROCEDURE — 92507 TX SP LANG VOICE COMM INDIV: CPT | Mod: GN

## 2023-10-24 PROCEDURE — 97530 THERAPEUTIC ACTIVITIES: CPT | Mod: GO,59

## 2023-10-24 PROCEDURE — 97535 SELF CARE MNGMENT TRAINING: CPT | Mod: GO

## 2023-10-24 NOTE — PROGRESS NOTES
Speech-Language Pathology Visit      SLP DAILY NOTE FOR OUTPATIENT THERAPY    Patient: Jennifer Sams   MRN: [id number]  : 1945 78 y.o.  Referring Physician: Liv Haywood, Debra  Date of Visit: 10/24/2023      Certification Dates: 23 through 23    Diagnosis:   1. Cerebrovascular accident (CVA), unspecified mechanism (CMS/HCC)        Chief Complaints:  apraxia    Precautions:    apraxia      TODAY'S VISIT:    Session Time:  Start Time: 1310 (Pt arrived late)  Stop Time: 1410  Time Calculation (min): 60 min   History/Vitals/Pain/Encounter Info - 10/24/23 1414        Injury History/Precautions/Daily Required Info    Primary Therapist Magnolia Lundy MS,CCC/SLP     Chief Complaint/Reason for Visit  apraxia     Onset of Illness/Injury or Date of Surgery 23     Referring Physician Dr. Haywood     Limitations/Impairments safety/cognitive     History of present illness/functional impairment Jennifer is a 78 y.o. female who presented to [facility] on  after she has not been seen by her friends for a few days.  Patient lives alone and when she was found by EMS she was confused and combative.  In the emergency department she was aphasic with right-sided weakness and hypertensive to the 190 systolic.  CT scan of the head revealed an acute infarct in left MCA with mild bleeding.  Echo showed an EF of 43% and a bubble study was negative.  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 Pt transferred to  [facility] on 2023 Principal problem  Acute ischemic left MCA stroke (CMS/HCC). PMH AAA, heart block s/p cardiac pacemaker, glaucoma, hyperlipidemia, coronary artery disease, She was evaluated by speech therapy and cleared for a soft and bite-size diet with moderately thick liquids.  2023:  Diet SB6 with mildly thick liquids (MT2)  2023: Upgraded to EC7 with thin liquids.  Pt discharged home with 24 hr care and home health services recieving  speech therapy for motor speech and aphasia.     Document Type daily treatment     Patient/Family/Caregiver Comments/Observations Pt's sister Thelma present for and participated in session.     Patient reported fall since last visit No        Pain Assessment    Currently in pain No/Denies        Pain Interventions    Intervention None     Post Intervention Comments None                Daily Treatment Assessment and Plan - 10/24/23 1414        Daily Treatment Assessment and Plan    Progress toward goals Progressing     Daily Outcome Summary Automatics with model, unison, pacing. Pt able to read/match up to short phrases in a visual field of 5 with pictures. Imitation of functional words progressing, phrases very difficult. A ttempted melodic intonation-may be beneficial. Attempted mirror for motor production of target bilabial phomenes but pt refused today.     Plan and Recommendations Explore ar, visual feedback with mirror, target phoneme word lists.Continue to add functional words, particularly with bilabials and tip alveolars to word bank.  Initiate AAC goals.                  OBJECTIVE MEASUREMENTS TAKEN TODAY:    None Taken   EDUCATION:Pt and sister provided with education this session regarding apraxia/aphasia strategies and skill practice. Pt needs reinforcement in skill practice to carryover.      Today's Treatment:    MOTOR/PRODUCTION SLP FLOW SHEET    SKILL AREA CURRENT SESSION   SPEECH PRODUCTION (MOTOR)  CPT 86088    Pt will produce bilabials /p, b, m, w/, tip alveolars /t, d, n/ and tense vowels /a, e, I, o,u/ in isolation provided mod A, 80% of trials.  PN: 10/17/2023  Partially Met   Pt will produce VC and CV syllables provided the above phonemes, Mod A, 80% of trials PN: 10/17/2023  Partially Met   Pt will produce single and bisyllablic words consisting of CVCV, VCV and CVC, Mod A, 80% of presentations. PN: 10/17/2023  Partially Met   Pt will produce simple automatics provided mod  "A:  1-10  A-G  Ar and identifiable syllable combinations for MALLY 10/24/2023:  Automatics: in unison with SLP: 3/10 first attempt, increased rate. Provided visual cue and pacing with unison, up to 8/10 upon 1 attempt.  Ar: Pt able to imitate 2 beat intonation ar, max (A) to apply paired hand gesture. Paired phrase \"I'm fine\" able to match ar and produce close approximation - \"I'm ---ine\".   Pt able to \"hum\" Happy Birthday ar in full as \"Na-na-na-na\"    PN: 10/17/2023  Continue  10/5/2023  1-10 using apraxia ted.     9/21/2023  1-11, missed 5&6  1-10, missed 5&8  A-D,   A-E    9/6/2023  Approximated for 70% of 1-10   Pt will produce small set of functional words/phrases in unison or in imitation with SLP, then produce 3  successive attempts with 80% accuracy  10/24/2023:  Repeat functional word list:  13/25 able to produce close, recognizable approximations in context. Often missing or approximating initial syllable.    High transparency phrase completion: 4/10     PN: 10/17/2023  Continue    10/10/2023  Pt provided a key ring of 9 functional phrases to practice independently.      9/22/23: Produced 9/14 functional words producing all phonemes. Frustrated throughout.    9/6/2023  Produced 8/14 functional words producing all phonemes.     8/25/2023  Established list of functional words, names. See below.  Pt stimulable for 5/16.  Approximations for remainder.   Noted with difficulty with velars, blends in particular.     GOAL: 8/25/2023  Given direct model, repetition and NV reinforcement, Pt will follow 1-step commands for motor speech production up to 1-2 syllable words, 80% of presentations.     New: 10/17/2023  The pt will demonstrate understanding of 1 noun in simple and present and progressive sentences F4, 80% accy.   (Advanced language L1-2 Identify)  10/17/2023- MET/Revise   Provided pictures/objects, and/or body parts, pt will demonstrate  understanding of \"point to___\" 85% of trials. "     Revise: Pictures, letters identified by phoneme and letter.      10/24/2023:  Aud discrim VF: 4 pictures: Point to the...: 4/4, 4/4, 4/4: 100%  Reading single word matching: VF: 3: 100%, VF: 5: 100%  Match phrase description to pic VF: 5: 100%    PN: 10/17/2023    Pictures and objects, F6: 70%. F4: 80%   NEW:   Pt will answer Y/N questions to general knowledge questions 90% of presentations.  1024/2023: Auditory comprehension: general knowledge yes/no: Initially having difficulty comprehending task. After initial 3 items, pt able to recall 88% accuracy.    PN: 10/17/2023  New   Education/Strategy Training  10/10/2023  Pt was able to match photo to written word in F4 this date.  Stating the words with Max A to produce first phoneme     Other 9/21/2023  Pt was able to participate in conversation about Thelma's horses and pt was compliant with practicing target syllables with target phonemes throughout conversation and added to functional word list.    8/25/2023  POA reports suspect decline in cognition recently.  Encouraged to report to MD and encourage improving sleep and nutrition routines.      Functional words and names: 8.25.2023  Dick Milner  Water   I'm thirsty  I'm fine  Arm hurts  Legs hurts  Call me  I'm tired  Feed Loan.

## 2023-10-24 NOTE — OP SLP TREATMENT LOG
"MOTOR/PRODUCTION SLP FLOW SHEET    SKILL AREA CURRENT SESSION   SPEECH PRODUCTION (MOTOR)  CPT 41450    Pt will produce bilabials /p, b, m, w/, tip alveolars /t, d, n/ and tense vowels /a, e, I, o,u/ in isolation provided mod A, 80% of trials.  PN: 10/17/2023  Partially Met   Pt will produce VC and CV syllables provided the above phonemes, Mod A, 80% of trials PN: 10/17/2023  Partially Met   Pt will produce single and bisyllablic words consisting of CVCV, VCV and CVC, Mod A, 80% of presentations. PN: 10/17/2023  Partially Met   Pt will produce simple automatics provided mod A:  1-10  A-G  Ar and identifiable syllable combinations for MALLY 10/24/2023:  Automatics: in unison with SLP: 3/10 first attempt, increased rate. Provided visual cue and pacing with unison, up to 8/10 upon 1 attempt.  Ar: Pt able to imitate 2 beat intonation ar, max (A) to apply paired hand gesture. Paired phrase \"I'm fine\" able to match ar and produce close approximation - \"I'm ---ine\".   Pt able to \"hum\" Happy Birthday ar in full as \"Na-na-na-na\"    PN: 10/17/2023  Continue  10/5/2023  1-10 using apraxia ted.     9/21/2023  1-11, missed 5&6  1-10, missed 5&8  A-D,   A-E    9/6/2023  Approximated for 70% of 1-10   Pt will produce small set of functional words/phrases in unison or in imitation with SLP, then produce 3  successive attempts with 80% accuracy  10/24/2023:  Repeat functional word list:  13/25 able to produce close, recognizable approximations in context. Often missing or approximating initial syllable.    High transparency phrase completion: 4/10     PN: 10/17/2023  Continue    10/10/2023  Pt provided a key ring of 9 functional phrases to practice independently.      9/22/23: Produced 9/14 functional words producing all phonemes. Frustrated throughout.    9/6/2023  Produced 8/14 functional words producing all phonemes.     8/25/2023  Established list of functional words, names. See below.  Pt stimulable for 5/16.  " "Approximations for remainder.   Noted with difficulty with velars, blends in particular.     GOAL: 8/25/2023  Given direct model, repetition and NV reinforcement, Pt will follow 1-step commands for motor speech production up to 1-2 syllable words, 80% of presentations.     New: 10/17/2023  The pt will demonstrate understanding of 1 noun in simple and present and progressive sentences F4, 80% accy.   (Advanced language L1-2 Identify)  10/17/2023- MET/Revise   Provided pictures/objects, and/or body parts, pt will demonstrate  understanding of \"point to___\" 85% of trials.     Revise: Pictures, letters identified by phoneme and letter.      10/24/2023:  Aud discrim VF: 4 pictures: Point to the...: 4/4, 4/4, 4/4: 100%  Reading single word matching: VF: 3: 100%, VF: 5: 100%  Match phrase description to pic VF: 5: 100%    PN: 10/17/2023    Pictures and objects, F6: 70%. F4: 80%   NEW:   Pt will answer Y/N questions to general knowledge questions 90% of presentations.  1024/2023: Auditory comprehension: general knowledge yes/no: Initially having difficulty comprehending task. After initial 3 items, pt able to recall 88% accuracy.    PN: 10/17/2023  New   Education/Strategy Training  10/10/2023  Pt was able to match photo to written word in F4 this date.  Stating the words with Max A to produce first phoneme     Other 9/21/2023  Pt was able to participate in conversation about Thelma's horses and pt was compliant with practicing target syllables with target phonemes throughout conversation and added to functional word list.    8/25/2023  POA reports suspect decline in cognition recently.  Encouraged to report to MD and encourage improving sleep and nutrition routines.      Functional words and names: 8.25.2023  Dick Milner  Water   I'm thirsty  I'm fine  Arm hurts  Legs hurts  Call me  I'm tired  Feed Loan.        "

## 2023-10-25 NOTE — PROGRESS NOTES
Occupational Therapy Progress Note    OT PROGRESS NOTE FOR OUTPATIENT THERAPY    Patient: Jennifer Sams MRN: [id number]  : 1945 78 y.o.  Referring Physician: Liv Haywood, Debra  Date of Visit: 10/24/2023      Certification Dates:   23 through 23    Recommended Frequency & Duration:  2 times/week for up to 3 months        Diagnosis:   1. Acute ischemic left MCA stroke (CMS/HCC)    2. Lack of coordination        Chief Complaints:  No chief complaint on file.      Precautions:       TODAY'S VISIT:    Time In Session:  Start Time: 1410  Stop Time: 1505  Time Calculation (min): 55 min   General Information - 10/24/23 1412        Session Details    Document Type progress note        General Information    Onset of Illness/Injury or Date of Surgery 23     Referring Physician Dr. Haywood     History of present illness/functional impairment The patient is a 78-year-old  female with a history of AAA, heart block s/p cardiac pacemaker, glaucoma, hyperlipidemia, coronary artery disease, NSTEMI, who presented to [facility] on  after she has not been seen by her friends for a few days.  Patient lives alone and when she was found by EMS she was confused and combative.  In the emergency department she was aphasic with right-sided weakness and hypertensive to the 190 systolic.  CT scan of the head revealed an acute infarct in left MCA with mild bleeding.  Echo showed an EF of 43% and a bubble study was negative.  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  Later this was transitioned to apixaban.  She was continued on statin for secondary prevention.  She required virtual shivani while in the hospital but this was not effective.  She was evaluated by speech therapy and cleared for a soft and bite-size diet with moderately thick liquids.'     Patient/Family/Caregiver Comments/Observations  Patient presents today without caregiver Thelma         Services    Do You Speak a Language Other Than English at Home? no                  Pain/Vitals - 10/24/23 1412        Pain Assessment    Currently in pain No/Denies        Pre Activity Vital Signs    /80     BP Location Left upper arm     BP Method Manual     Patient Position Sitting                OT - 10/24/23 2201        Occupational Therapy Encounter Type Details    Occupational Therapy Specialty Traditional Neuro Program OT        OT Frequency and Duration    Frequency of treatment 2 times/week     OT Duration 3 months     OT Cert From 08/16/23     OT Cert To 11/14/23     Date OT POC was sent to provider 08/16/23     Signed OT Plan of Care received?  Yes                Assessment - 10/24/23 2215        Assessment    Plan of Care reviewed and patient/family in agreement Yes     System Pathology/Pathophysiology Noted neuromuscular     Functional Limitations in Following Categories self-care;home management;community/leisure     Problem List: Occupational Therapy motor control impaired;sensation decreased;decreased flexibility;impaired swallowing;sensory feedback impaired;impaired cognition;inability to direct their own care;pain;ROM decreased;strength decreased     Rehab Potential/Prognosis: Occupational Therapy good, to achieve stated therapy goals     Clinical Assessment Patient was seen in OP OT for improved functional use of RUE s/p CVA and progress evaluation was conducted today. Patient was seen for initial OT evaluation on 8/16/2023 and has participated in 12 Occupational Therapy sessions, including this visit. At initial evaluation she presented with deficits related to right hand range of motion, fine motor coordination at RUE, right hand dexterity, right hand  and pinch strength and performance with ADLs and IADLs. The following objective measures were administered today: Box and Block test has improved to 18 blocks (baseline: 4  blocks), distance from MF to DPC has improved to 1.5 cm (basline: 6.5 cm),  strength has improved to  4.3 lbs (baseline: 0 lbs),  and lateral pinch has improved to 3 lbs (baseline: 0 lbs). Subjectively patient indicates incorporating RUE with routine tasks including folding laundry and washing dishes. Discussion of current ADL and IADL performance limited due to aphasia and will be updated next session with caregiver Thelma. Per results of progress evaluation patient with improving dexterity and strength at right hand, and further gains are anticipated. Recommend continued participation in OP OT 2x/week for 3 months.     Plan and Recommendations Incorporate rehabilitative and compensatory strategies for RUE sensory and motor recovery; frustration management                 OBJECTIVE MEASUREMENTS/DATA:    Skin and Sensation    Skin and Sensation - 10/24/23 1418        OTHER    Skin Assessment/Comments RUE impaired deep pressure, proprioception and light touch sensation               Range of Motion     PROM/AROM - 10/24/23 1400        RIGHT: Upper Extremity AROM Assessment    Right hand AROM:  1.5 cm MF to DPC               Gross and Fine Motor     Gross and Fine Motor - 10/24/23 1418        Hand  Strength Testing    Left Hand, Setting 2 27/37/37; Average =33.3 lbs     Right Hand, Setting 2 4/5/4; 3.3 lb     Left Hand: Tip (Pincer) Pinch Strength 5     Left Hand: Lateral (Key) Pinch Strength 12.5 lb     Left Hand: Three Point (Yonatan) Pinch Strength 9     Right Hand: Tip (Pincer) Pinch Strength 0     Right Hand: Lateral (Key) Pinch Strength 3     Right Hand: Three Point (Yonatan) Pinch Strength 0               Outcome Measures    Outcome Measures - 10/24/23 1418        Objective Outcome Measures    RIGHT hand: Box and Blocks Assessment 18                 ROM and MMT        9/14/2023    18:07   OT UE MMT   Right Shoulder Flexion (4+/5) good plus   Left Shoulder Flexion (4+/5) good plus   Right Shoulder Extension  (4+/5) good plus   Left Shoulder Extension (4+/5) good plus   Right Shoulder ADD (4+/5) good plus   Left Shoulder ADD (4+/5) good plus   Right Shoulder ABD (4+/5) good plus   Left Shoulder ABD (4+/5) good plus   Right Shoulder IR (3+/5) fair plus   Left Shoulder IR (4+/5) good plus   Right Shoulder ER (4/5) good   Left Shoulder ER (4+/5) good plus   Right Elbow Flexion (4+/5) good plus   Left Elbow Flexion (4+/5) good plus   Right Elbow Extension (4+/5) good plus   Left Elbow Extension (4+/5) good plus   Right Forearm Supination (4/5) good   Left Forearm Supination (4+/5) good plus   Right Forearm Pronation (4/5) good   Left Forearm Pronation (4+/5) good plus   Right Wrist Flexion (3+/5) fair plus   Left Wrist Flexion (4/5) good   Right Wrist Extension (3+/5) fair plus   Left Wrist Extension (4/5) good   Right Wrist UD (2/5) poor   Left Wrist UD (4/5) good   Right Wrist RD (2/5) poor   Left Wrist RD (4/5) good     Outcome Measures        8/16/2023    14:03 9/14/2023    18:07 9/21/2023    16:29 10/24/2023    14:18   OT OBJECTIVE Outcome Measures   9 Hole Peg Test - Right Hand  --        Unable to complete after 45 seconds     9 Hole Peg Test - Left Hand  24.55     9 Hole Peg Test - Comments  Able to use gross grasp to  5 small mosaic glass beads and place in target area in 38.85 seconds     Right Hand Box and Blocks 4 22  18   Left Hand Box and Blocks 50 56     Dynavision - Impression  Impaired     Other Outcome Measure   Right hand edema        Today's Treatment::    Education provided:  Yes: See treatment log for details of education provided  Methods: Demonstration  Readiness: acceptance  Response: Demonstrated understanding      OT NEURO FLOW SHEET    EXERCISES  Initial Evaluation  Progress Note 1  Progress Note 2 CURRENT SESSION  8/16/2023  9/14/2023  10/23/23 TIME   NEURO RE-ED  CPT 89803 TOTAL TIME FOR SESSION 23-37 Minutes   AAROM/AROM     Strengthening      Strength Retested today for progress  evaluation    Gross Motor Control     Fine Motor Control Dexterity retested via Box and Blocks test    Patient participated in grasp and place activity with wooden pegs using tripod grasp    Sitting Jennifer/Balance     Standing Jennifer/Balance     Sensory re-education      Retraining     THERE ACT  CPT 10890 TOTAL TIME FOR SESSION 8-22 Minutes   Pain, Vitals, Meds, Etc. Vitals taken, Rest breaks provided for frustration management    Assessment     HEP Discussed carry over with use of SF splint    9/28/23: Laterality exercise added to HEP; patient and caregiver expressed good understanding    Right hand stretching    9/21/2023:   -Educated on use of rice/bean sensory bin, massage, sensory re-education with deep pressure and textures. Issued tubigrip for RUE sensory re-education and advised to wear 1x per day. Continued to discuss edema management.     Functional Mobility     Transfers     THERE EX  CPT 66134 TOTAL TIME FOR SESSION 0-7 Minutes   PROM     Activity Tolerance     SELF-CARE  CPT 75851 TOTAL TIME FOR SESSION 8-22 Minutes   Pt Education/Safety     ADL Training Simulated bathing activity with RUE reach to head area for hair care    IADL Training     MODALITIES  CPT 10616 TOTAL TIME FOR SESSION Not performed   Ice/Heat     ATTENDED E-STIM  CPT 82255 TOTAL TIME FOR SESSION Not performed   Attended E-Stim     SPLINTING  CPT 40820 TOTAL TIME FOR SESSION Not performed   Fabrication/Check Out     Fit     Training     GROUP  CPT 36216 TOTAL TIME FOR SESSION Not performed             Normative Range for Female 75+ Years of Age       Right Hand   Left Hand      Initial Evaluation Progress Note 1 Norm Initial Evaluation Progress Note 1 Norm    Strength 0 lbs 0 lbs 42.6 lbs 30 lbs 30 lbs 37.6 lbs   Lateral Pinch N/T 2 lbs 12.6 lbs N/T 14 lbs 11.4 lbs   Three Jaw Yonatan N/T 2 lbs (assist for position) 12 lbs N/T 11 lbs 11.5 lbs   Tip Pinch N/T N/T 9.6 lbs N/T 9 lbs 9.3 lbs   Box and Block 4 blocks 22 blocks 65 blocks 50  blocks 56 blocks 63.6 blocks   Nine Hole Peg (71+ age) Unable to complete Unable to complete 22.49 seconds N/T 24.55 seconds 24.11 seconds            Goals Addressed                 This Visit's Progress     OT Neuro/Deconditioning Goals          Short Term Goals Time Frame Result Comment/Progress   Assess gross motor control via Box and Block Assessment  4 weeks Met 9/14: R: 22; L: 56   Assess fine motor control via 9 hole peg test 4 weeks Met  9/14: R: Unable; L: 24.55   Improved automatic fine motor use at right hand with routine tasks (e.g. eating, writing, brushing teeth) to 25% of baseline  4 weeks Ongoing     Explore adaptive visual strategies to encourage looking to right side 4 weeks Ongoing    Incorporate right hand in at least 2 self-care activities 4 weeks Ongoing    Carry over HEP at least 3 times per week  4 weeks Ongoing        Long Term Goals Time Frame Result Comment/Progress   Improve gross motor control as evidenced by at least 10 block improved at RUE with Box and Block Assessment for improved ability to perform ADLs and IADLs  12 weeks Met     Patient will complete 9 hole peg test in under 3 minutes 12 weeks Ongoing    Decrease distance from Right MF to DPC to 2 cm for improved functional grasp at right hand 12 weeks Met     Improved automatic fine motor use at right hand with routine tasks (e.g. eating, writing, brushing teeth) to 75% of baseline 12 weeks Ongoing

## 2023-10-25 NOTE — OP OT TREATMENT LOG
OT NEURO FLOW SHEET    EXERCISES  Initial Evaluation  Progress Note 1  Progress Note 2 CURRENT SESSION  8/16/2023  9/14/2023  10/23/23 TIME   NEURO RE-ED  CPT 98929 TOTAL TIME FOR SESSION 23-37 Minutes   AAROM/AROM     Strengthening      Strength Retested today for progress evaluation    Gross Motor Control     Fine Motor Control Dexterity retested via Box and Blocks test    Patient participated in grasp and place activity with wooden pegs using tripod grasp    Sitting Jennifer/Balance     Standing Jennifer/Balance     Sensory re-education      Retraining     THERE ACT  CPT 96515 TOTAL TIME FOR SESSION 8-22 Minutes   Pain, Vitals, Meds, Etc. Vitals taken, Rest breaks provided for frustration management    Assessment     HEP Discussed carry over with use of SF splint    9/28/23: Laterality exercise added to HEP; patient and caregiver expressed good understanding    Right hand stretching    9/21/2023:   -Educated on use of rice/bean sensory bin, massage, sensory re-education with deep pressure and textures. Issued tubigrip for RUE sensory re-education and advised to wear 1x per day. Continued to discuss edema management.     Functional Mobility     Transfers     THERE EX  CPT 73592 TOTAL TIME FOR SESSION 0-7 Minutes   PROM     Activity Tolerance     SELF-CARE  CPT 74491 TOTAL TIME FOR SESSION 8-22 Minutes   Pt Education/Safety     ADL Training Simulated bathing activity with RUE reach to head area for hair care    IADL Training     MODALITIES  CPT 68645 TOTAL TIME FOR SESSION Not performed   Ice/Heat     ATTENDED E-STIM  CPT 13451 TOTAL TIME FOR SESSION Not performed   Attended E-Stim     SPLINTING  CPT 65112 TOTAL TIME FOR SESSION Not performed   Fabrication/Check Out     Fit     Training     GROUP  CPT 80598 TOTAL TIME FOR SESSION Not performed             Normative Range for Female 75+ Years of Age       Right Hand   Left Hand      Initial Evaluation Progress Note 1 Norm Initial Evaluation Progress Note 1 Norm     Strength 0 lbs 0 lbs 42.6 lbs 30 lbs 30 lbs 37.6 lbs   Lateral Pinch N/T 2 lbs 12.6 lbs N/T 14 lbs 11.4 lbs   Three Jaw Yonatan N/T 2 lbs (assist for position) 12 lbs N/T 11 lbs 11.5 lbs   Tip Pinch N/T N/T 9.6 lbs N/T 9 lbs 9.3 lbs   Box and Block 4 blocks 22 blocks 65 blocks 50 blocks 56 blocks 63.6 blocks   Nine Hole Peg (71+ age) Unable to complete Unable to complete 22.49 seconds N/T 24.55 seconds 24.11 seconds

## 2023-10-26 ENCOUNTER — HOSPITAL ENCOUNTER (OUTPATIENT)
Dept: SPEECH THERAPY | Age: 78
Setting detail: THERAPIES SERIES
Discharge: HOME | End: 2023-10-26
Attending: PHYSICAL MEDICINE & REHABILITATION
Payer: MEDICARE

## 2023-10-26 ENCOUNTER — HOSPITAL ENCOUNTER (OUTPATIENT)
Dept: OCCUPATIONAL THERAPY | Age: 78
Setting detail: THERAPIES SERIES
Discharge: HOME | End: 2023-10-26
Attending: PHYSICAL MEDICINE & REHABILITATION
Payer: MEDICARE

## 2023-10-26 DIAGNOSIS — I63.512 ACUTE ISCHEMIC LEFT MCA STROKE (CMS/HCC): ICD-10-CM

## 2023-10-26 DIAGNOSIS — I63.9 CEREBROVASCULAR ACCIDENT (CVA), UNSPECIFIED MECHANISM (CMS/HCC): Primary | ICD-10-CM

## 2023-10-26 DIAGNOSIS — I63.512 ACUTE ISCHEMIC LEFT MCA STROKE (CMS/HCC): Primary | ICD-10-CM

## 2023-10-26 DIAGNOSIS — R27.9 LACK OF COORDINATION: ICD-10-CM

## 2023-10-26 PROCEDURE — 97535 SELF CARE MNGMENT TRAINING: CPT | Mod: GO

## 2023-10-26 PROCEDURE — 97112 NEUROMUSCULAR REEDUCATION: CPT | Mod: GO,59

## 2023-10-26 PROCEDURE — 92507 TX SP LANG VOICE COMM INDIV: CPT | Mod: GN

## 2023-10-26 ASSESSMENT — 9 HOLE PEG TEST: TEST_RESULT: 48.6

## 2023-10-26 NOTE — OP SLP TREATMENT LOG
"MOTOR/PRODUCTION SLP FLOW SHEET    SKILL AREA CURRENT SESSION   SPEECH PRODUCTION (MOTOR)  CPT 93002    Pt will produce bilabials /p, b, m, w/, tip alveolars /t, d, n/ and tense vowels /a, e, I, o,u/ in isolation provided mod A, 80% of trials.  10/26/2023:  Attempted in isolation but more difficult than below. Pt able to press lips together with verbal cue and model and was able to produce a /p/ in insolation x 1.    PN: 10/17/2023  Partially Met   Pt will produce VC and CV syllables provided the above phonemes, Mod A, 80% of trials 10/26/2023:  /b/ IP CV and CVC: on 1st attempt after SLP model:   3/5 (60%) on 1st attempt, 5/5 with moderate prompting, visual/verbal cues  /p/ in IP CVC/CVCV: on 1st attempt after SLP model:   1/5 (20%), able to produce elsewhere x 1 with max cues (/p/ --> /b/). Coarticulation with vowel /o/ most successful with /p/.   /m/ in IP of CVC/CVCV: on 1st attempt after SLP model:  3/5 (60%), able to produce 5/5 with mod cues (\"press lips together\").    PN: 10/17/2023  Partially Met   Pt will produce single and bisyllablic words consisting of CVCV, VCV and CVC, Mod A, 80% of presentations. 10/26/2023:  Given picture with various \"things wrong\" (BCAT KPT- of note, pt pointed to all wrong things 4/4 (I))- pt able to spontaneously say: \"ilk\" for milk, \"break\" for broken plate, \"hot\" for hot pan, \"apple\" and \"fall\" for items in fridge.     Given action pictures (LARK): pt able to produce at least a single recognizable word spontaneously for approximately 50% of pictures. Words often marked by initial consonant deletion. Pt with emerging /s/ in initial position- \"salt\", \"sea\", \"sock\".    PN: 10/17/2023  Partially Met   Pt will produce simple automatics provided mod A:  1-10  A-G  Yolis and identifiable syllable combinations for MALLY 10/24/2023:  Automatics: in unison with SLP: 3/10 first attempt, increased rate. Provided visual cue and pacing with unison, up to 8/10 upon 1 attempt.  Yolis: Pt able " "to imitate 2 beat intonation ar, max (A) to apply paired hand gesture. Paired phrase \"I'm fine\" able to match ar and produce close approximation - \"I'm ---ine\".   Pt able to \"hum\" Happy Birthday ar in full as \"Na-na-na-na\"    PN: 10/17/2023  Continue  10/5/2023  1-10 using apraxia ted.     9/21/2023  1-11, missed 5&6  1-10, missed 5&8  A-D,   A-E    9/6/2023  Approximated for 70% of 1-10   Pt will produce small set of functional words/phrases in unison or in imitation with SLP, then produce 3  successive attempts with 80% accuracy  10/24/2023:  Repeat functional word list:  13/25 able to produce close, recognizable approximations in context. Often missing or approximating initial syllable.    High transparency phrase completion: 4/10     PN: 10/17/2023  Continue    10/10/2023  Pt provided a key ring of 9 functional phrases to practice independently.      9/22/23: Produced 9/14 functional words producing all phonemes. Frustrated throughout.    9/6/2023  Produced 8/14 functional words producing all phonemes.     8/25/2023  Established list of functional words, names. See below.  Pt stimulable for 5/16.  Approximations for remainder.   Noted with difficulty with velars, blends in particular.     The pt will demonstrate understanding of 1 noun in simple and present and progressive sentences F4, 80% accy.   (Advanced language L1-2 Identify)  10/17/2023- MET/Revise   Provided pictures/objects, and/or body parts, pt will demonstrate  understanding of \"point to___\" 85% of trials.     Revise: Pictures, letters identified by phoneme and letter.      10/24/2023:  Aud discrim VF: 4 pictures: Point to the...: 4/4, 4/4, 4/4: 100%  Reading single word matching: VF: 3: 100%, VF: 5: 100%  Match phrase description to pic VF: 5: 100%    PN: 10/17/2023    Pictures and objects, F6: 70%. F4: 80%   NEW:   Pt will answer Y/N questions to general knowledge questions 90% of presentations.  1024/2023: Auditory comprehension: " general knowledge yes/no: Initially having difficulty comprehending task. After initial 3 items, pt able to recall 88% accuracy.    PN: 10/17/2023  New   Education/Strategy Training  10/10/2023  Pt was able to match photo to written word in F4 this date.  Stating the words with Max A to produce first phoneme     Other 9/21/2023  Pt was able to participate in conversation about Thelma's horses and pt was compliant with practicing target syllables with target phonemes throughout conversation and added to functional word list.    8/25/2023  POA reports suspect decline in cognition recently.  Encouraged to report to MD and encourage improving sleep and nutrition routines.      Functional words and names: 8.25.2023  Dick Milner  Water   I'm thirsty  I'm fine  Arm hurts  Legs hurts  Call me  I'm tired  [employer].

## 2023-10-26 NOTE — OP OT TREATMENT LOG
OT NEURO FLOW SHEET    EXERCISES  Initial Evaluation  Progress Note 1  Progress Note 2 CURRENT SESSION  8/16/2023  9/14/2023  10/23/23 TIME   NEURO RE-ED  CPT 53960 TOTAL TIME FOR SESSION 23-37 Minutes   AAROM/AROM     Strengthening      Strength Right hand pinch with soft theraputty  - Issued soft theraputty for use at [facility]    Gross Motor Control     Fine Motor Control Wooden peg grasp, place and remove from pegboard x 2 reps  - Timed with removal from pegboard only today (48.6 sec)    Sitting Jennifer/Balance     Standing Jennifer/Balance     Sensory re-education      Retraining     THERE ACT  CPT 94122 TOTAL TIME FOR SESSION 0-7 Minutes   Pain, Vitals, Meds, Etc. Pain assessed, Rest breaks provided for frustration management    Assessment     HEP 10/26/23: Added soft theraputty for right hand pinch (lateral pinch, tip pinch, 3 jaw patti); patient expressed good understnading    Discussed carry over with use of SF splint    9/28/23: Laterality exercise added to HEP; patient and caregiver expressed good understanding    Right hand stretching    9/21/2023:   -Educated on use of rice/bean sensory bin, massage, sensory re-education with deep pressure and textures. Issued tubigrip for RUE sensory re-education and advised to wear 1x per day. Continued to discuss edema management.     Functional Mobility     Transfers     THERE EX  CPT 28992 TOTAL TIME FOR SESSION 0-7 Minutes   PROM     Activity Tolerance     SELF-CARE  CPT 21585 TOTAL TIME FOR SESSION 8-22 Minutes   Pt Education/Safety     ADL Training Updated self-care performance status today    IADL Training Updated IADL performance status, explored adaptive strategies for lifting/carrying with BUE    MODALITIES  CPT 65001 TOTAL TIME FOR SESSION Not performed   Ice/Heat     ATTENDED E-STIM  CPT 95295 TOTAL TIME FOR SESSION Not performed   Attended E-Stim     SPLINTING  CPT 85748 TOTAL TIME FOR SESSION Not performed   Fabrication/Check Out     Fit     Training      GROUP  CPT 55288 TOTAL TIME FOR SESSION Not performed             Normative Range for Female 75+ Years of Age       Right Hand   Left Hand      Initial Evaluation Progress Note 1 Norm Initial Evaluation Progress Note 1 Norm    Strength 0 lbs 0 lbs 42.6 lbs 30 lbs 30 lbs 37.6 lbs   Lateral Pinch N/T 2 lbs 12.6 lbs N/T 14 lbs 11.4 lbs   Three Jaw Yonatan N/T 2 lbs (assist for position) 12 lbs N/T 11 lbs 11.5 lbs   Tip Pinch N/T N/T 9.6 lbs N/T 9 lbs 9.3 lbs   Box and Block 4 blocks 22 blocks 65 blocks 50 blocks 56 blocks 63.6 blocks   Nine Hole Peg (71+ age) Unable to complete Unable to complete 22.49 seconds N/T 24.55 seconds 24.11 seconds

## 2023-10-26 NOTE — PROGRESS NOTES
Occupational Therapy Visit    OT DAILY NOTE FOR OUTPATIENT THERAPY    Patient: Jennifer Sams   MRN: [id number]  : 1945 78 y.o.  Referring Physician: Liv Haywood, Debra  Date of Visit: 10/26/2023      Certification Dates: 23 through 23    Diagnosis:   1. Acute ischemic left MCA stroke (CMS/HCC)    2. Lack of coordination        Chief Complaints:   Brain Injury    Precautions:       TODAY'S VISIT    Time In Session:  Start Time: 1400  Stop Time: 1455  Time Calculation (min): 55 min   History/Vitals/Pain/Encounter Info - 10/26/23 1531        Injury History/Precautions/Daily Required Info    Document Type progress note     Chief Complaint/Reason for Visit  Brain Injury     Onset of Illness/Injury or Date of Surgery 23     Referring Physician Dr. Haywood     History of present illness/functional impairment The patient is a 78-year-old  female with a history of AAA, heart block s/p cardiac pacemaker, glaucoma, hyperlipidemia, coronary artery disease, NSTEMI, who presented to [facility] on  after she has not been seen by her friends for a few days.  Patient lives alone and when she was found by EMS she was confused and combative.  In the emergency department she was aphasic with right-sided weakness and hypertensive to the 190 systolic.  CT scan of the head revealed an acute infarct in left MCA with mild bleeding.  Echo showed an EF of 43% and a bubble study was negative.  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  Later this was transitioned to apixaban.  She was continued on statin for secondary prevention.  She required virtual shivani while in the hospital but this was not effective.  She was evaluated by speech therapy and cleared for a soft and bite-size diet with moderately thick liquids.'     Patient/Family/Caregiver Comments/Observations Patient presents with caregiver  Thelma today; she reports able to tie her shoes today     Patient reported fall since last visit No        Pain Assessment    Currently in pain No/Denies         Services    Do You Speak a Language Other Than English at Home? no                Daily Treatment Assessment and Plan - 10/26/23 1531        Daily Treatment Assessment and Plan    Progress toward goals Progressing     Daily Outcome Summary Patient was seen in OP OT for improved functional use of RUE and accompanied by caregiver Thelma who assisted with updating ADL/IADL status today. Improvement reported with LE dressing as patient is now intermittently tying shoes, and UE dressing with use of RUE to pull down left sleeve. Patient reports reluctant to incorporate RUE with lifting and carrying due to freuqency of drops and participated in exercise today for bilateral UE carrying with FA supinated and items held close to body. Patient demosntrates ability to use of RUE to assist for carrying but unclear if she understands the benefit and importnance of incorporating RUE as she is able to use left hand only at this time. Patient removed all pegs from pegboard in 48.6 seconds today with several errors; this metric will be used to track progress with fine motor control as patient is not able to complete 9 hole peg test at this time.     Plan and Recommendations Incorporate rehabilitative and compensatory strategies for RUE sensory and motor recovery; frustration management                     OBJECTIVE DATA TAKEN TODAY    BADL/IADL    BADL/IADL - 10/26/23 1401        BADL Interventions Assessment    Upper Body Dressing Modified independence     Lower Body Dressing Modified independence     Lower body dressing comments Patient with intermittent success tying shoes, improving with sock donning. 25-30 min for complete lower body dressing.     Bathing Minimum assist (75% patient effort)     Bathing comments Some difficulties with washing hair and bathing      Toileting Independent     Grooming Moderate assist (50% patient effort)     Grooming comments Usually automatic toothbrush. Unable to use blowdryer and hairbrush. Grooming is not to satisfaction     Eating Minimum assist (75% patient effort)     Eating comments diet is soft food only; requires assist with cutting into pieces. Incorporating RUE minimally        IADL/Home Interventions Assessment    Home management/maintenance Minimum assist (75% patient effort)     Home management/maintenance comments Difficulty with hand washing dishes, using LUE for dusting, gets assistance with vacuuming due to heart condition.     Meal prep / clean up Maximum assist (25% patient effort)     Meal prep / clean up comments Able to reheat in microwave but not preparing food at this time, hot or cold               Outcome Measures    Outcome Measures - 10/26/23 1401        Objective Outcome Measures    RIGHT hand: Box and Blocks Assessment --     9 Hole Peg Test - RIGHT HAND TIME 48.6   Removing pegs only                Today's Treatment:      OT NEURO FLOW SHEET    EXERCISES  Initial Evaluation  Progress Note 1  Progress Note 2 CURRENT SESSION  8/16/2023  9/14/2023  10/23/23 TIME   NEURO RE-ED  CPT 60956 TOTAL TIME FOR SESSION 23-37 Minutes   AAROM/AROM     Strengthening      Strength Right hand pinch with soft theraputty  - Issued soft theraputty for use at [facility]    Gross Motor Control     Fine Motor Control Wooden peg grasp, place and remove from pegboard x 2 reps  - Timed with removal from pegboard only today (48.6 sec)    Sitting Jennifer/Balance     Standing Jennifer/Balance     Sensory re-education      Retraining     THERE ACT  CPT 68443 TOTAL TIME FOR SESSION 0-7 Minutes   Pain, Vitals, Meds, Etc. Pain assessed, Rest breaks provided for frustration management    Assessment     HEP 10/26/23: Added soft theraputty for right hand pinch (lateral pinch, tip pinch, 3 jaw patti); patient expressed good understnading    Discussed carry  over with use of SF splint    9/28/23: Laterality exercise added to HEP; patient and caregiver expressed good understanding    Right hand stretching    9/21/2023:   -Educated on use of rice/bean sensory bin, massage, sensory re-education with deep pressure and textures. Issued tubigrip for RUE sensory re-education and advised to wear 1x per day. Continued to discuss edema management.     Functional Mobility     Transfers     THERE EX  CPT 87501 TOTAL TIME FOR SESSION 0-7 Minutes   PROM     Activity Tolerance     SELF-CARE  CPT 33272 TOTAL TIME FOR SESSION 8-22 Minutes   Pt Education/Safety     ADL Training Updated self-care performance status today    IADL Training Updated IADL performance status, explored adaptive strategies for lifting/carrying with BUE    MODALITIES  CPT 97395 TOTAL TIME FOR SESSION Not performed   Ice/Heat     ATTENDED E-STIM  CPT 95780 TOTAL TIME FOR SESSION Not performed   Attended E-Stim     SPLINTING  CPT 06271 TOTAL TIME FOR SESSION Not performed   Fabrication/Check Out     Fit     Training     GROUP  CPT 67346 TOTAL TIME FOR SESSION Not performed             Normative Range for Female 75+ Years of Age       Right Hand   Left Hand      Initial Evaluation Progress Note 1 Norm Initial Evaluation Progress Note 1 Norm    Strength 0 lbs 0 lbs 42.6 lbs 30 lbs 30 lbs 37.6 lbs   Lateral Pinch N/T 2 lbs 12.6 lbs N/T 14 lbs 11.4 lbs   Three Jaw Yonatan N/T 2 lbs (assist for position) 12 lbs N/T 11 lbs 11.5 lbs   Tip Pinch N/T N/T 9.6 lbs N/T 9 lbs 9.3 lbs   Box and Block 4 blocks 22 blocks 65 blocks 50 blocks 56 blocks 63.6 blocks   Nine Hole Peg (71+ age) Unable to complete Unable to complete 22.49 seconds N/T 24.55 seconds 24.11 seconds

## 2023-10-26 NOTE — PROGRESS NOTES
Speech-Language Pathology Visit      SLP DAILY NOTE FOR OUTPATIENT THERAPY    Patient: Jennifer Sams   MRN: [id number]  : 1945 78 y.o.  Referring Physician: Liv Haywood, Debra  Date of Visit: 10/26/2023      Certification Dates: 23 through 23    Diagnosis:   1. Cerebrovascular accident (CVA), unspecified mechanism (CMS/HCC)    2. Acute ischemic left MCA stroke (CMS/HCC)        Chief Complaints:  apraxia    Precautions:  Precautions Comments: aphasia, apraxia, pacemaker       TODAY'S VISIT:    Session Time:  Start Time: 1300  Stop Time: 1400  Time Calculation (min): 60 min   History/Vitals/Pain/Encounter Info - 10/26/23 1416        Injury History/Precautions/Daily Required Info    Primary Therapist Magnolia Lundy MS,CCC/SLP     Chief Complaint/Reason for Visit  apraxia     Onset of Illness/Injury or Date of Surgery 23     Referring Physician Dr. Haywood     Precautions Comments aphasia, apraxia, pacemaker     Limitations/Impairments safety/cognitive     History of present illness/functional impairment Jennifer is a 78 y.o. female who presented to [facility] on  after she has not been seen by her friends for a few days.  Patient lives alone and when she was found by EMS she was confused and combative.  In the emergency department she was aphasic with right-sided weakness and hypertensive to the 190 systolic.  CT scan of the head revealed an acute infarct in left MCA with mild bleeding.  Echo showed an EF of 43% and a bubble study was negative.  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 Pt transferred to  [facility] on 2023 Principal problem  Acute ischemic left MCA stroke (CMS/HCC). PMH AAA, heart block s/p cardiac pacemaker, glaucoma, hyperlipidemia, coronary artery disease, She was evaluated by speech therapy and cleared for a soft and bite-size diet with moderately thick liquids.  2023:  Diet SB6 with mildly thick liquids  (MT2)  7/6/2023: Upgraded to EC7 with thin liquids.  Pt discharged home with 24 hr care and home health services recieving speech therapy for motor speech and aphasia.     Document Type daily treatment     Patient/Family/Caregiver Comments/Observations Pt's sister Thelma present for and participated in session.     Patient reported fall since last visit No        Pain Assessment    Currently in pain No/Denies        Pain Interventions    Intervention None     Post Intervention Comments None                Daily Treatment Assessment and Plan - 10/26/23 1416        Daily Treatment Assessment and Plan    Progress toward goals Progressing     Daily Outcome Summary Increased verbal productions in tasks of reduced structure today (talking about a picture, conversation). Improving ability to produce target bilabial phonemes with given cues in initial positions of words, also emerging /s/ sound in initial position.     Plan and Recommendations Continue to educate/offer visual feedback with mirror, target phoneme word lists with mutlimodal presentation (word/picture/verbal model).Continue to add functional words, particularly with bilabials and tip alveolars to word bank, possibly trial in phoneme groups and in random. Initiate AAC goals.                  OBJECTIVE MEASUREMENTS TAKEN TODAY:    None Taken     Pt and sister provided with education this session regarding apraxia and aphasia strategies and skill practice. Pt gestured understanding but needs reinforcement in skill practice due to significant language and motor speech impairments.      Today's Treatment:    MOTOR/PRODUCTION SLP FLOW SHEET    SKILL AREA CURRENT SESSION   SPEECH PRODUCTION (MOTOR)  CPT 20960    Pt will produce bilabials /p, b, m, w/, tip alveolars /t, d, n/ and tense vowels /a, e, I, o,u/ in isolation provided mod A, 80% of trials.  10/26/2023:  Attempted in isolation but more difficult than below. Pt able to press lips together with verbal cue and  "model and was able to produce a /p/ in insolation x 1.    PN: 10/17/2023  Partially Met   Pt will produce VC and CV syllables provided the above phonemes, Mod A, 80% of trials 10/26/2023:  /b/ IP CV and CVC: on 1st attempt after SLP model:   3/5 (60%) on 1st attempt, 5/5 with moderate prompting, visual/verbal cues  /p/ in IP CVC/CVCV: on 1st attempt after SLP model:   1/5 (20%), able to produce elsewhere x 1 with max cues (/p/ --> /b/). Coarticulation with vowel /o/ most successful with /p/.   /m/ in IP of CVC/CVCV: on 1st attempt after SLP model:  3/5 (60%), able to produce 5/5 with mod cues (\"press lips together\").    PN: 10/17/2023  Partially Met   Pt will produce single and bisyllablic words consisting of CVCV, VCV and CVC, Mod A, 80% of presentations. 10/26/2023:  Given picture with various \"things wrong\" (BCAT KPT- of note, pt pointed to all wrong things 4/4 (I))- pt able to spontaneously say: \"ilk\" for milk, \"break\" for broken plate, \"hot\" for hot pan, \"apple\" and \"fall\" for items in fridge.     Given action pictures (LARK): pt able to produce at least a single recognizable word spontaneously for approximately 50% of pictures. Words often marked by initial consonant deletion. Pt with emerging /s/ in initial position- \"salt\", \"sea\", \"sock\".    PN: 10/17/2023  Partially Met   Pt will produce simple automatics provided mod A:  1-10  A-G  Ar and identifiable syllable combinations for MALLY 10/24/2023:  Automatics: in unison with SLP: 3/10 first attempt, increased rate. Provided visual cue and pacing with unison, up to 8/10 upon 1 attempt.  Ar: Pt able to imitate 2 beat intonation ar, max (A) to apply paired hand gesture. Paired phrase \"I'm fine\" able to match ar and produce close approximation - \"I'm ---ine\".   Pt able to \"hum\" Happy Birthday ar in full as \"Na-na-na-na\"    PN: 10/17/2023  Continue  10/5/2023  1-10 using apraxia ted.     9/21/2023  1-11, missed 5&6  1-10, missed 5&8  A-D, " "  A-E    9/6/2023  Approximated for 70% of 1-10   Pt will produce small set of functional words/phrases in unison or in imitation with SLP, then produce 3  successive attempts with 80% accuracy  10/24/2023:  Repeat functional word list:  13/25 able to produce close, recognizable approximations in context. Often missing or approximating initial syllable.    High transparency phrase completion: 4/10     PN: 10/17/2023  Continue    10/10/2023  Pt provided a key ring of 9 functional phrases to practice independently.      9/22/23: Produced 9/14 functional words producing all phonemes. Frustrated throughout.    9/6/2023  Produced 8/14 functional words producing all phonemes.     8/25/2023  Established list of functional words, names. See below.  Pt stimulable for 5/16.  Approximations for remainder.   Noted with difficulty with velars, blends in particular.     The pt will demonstrate understanding of 1 noun in simple and present and progressive sentences F4, 80% accy.   (Advanced language L1-2 Identify)  10/17/2023- MET/Revise   Provided pictures/objects, and/or body parts, pt will demonstrate  understanding of \"point to___\" 85% of trials.     Revise: Pictures, letters identified by phoneme and letter.      10/24/2023:  Aud discrim VF: 4 pictures: Point to the...: 4/4, 4/4, 4/4: 100%  Reading single word matching: VF: 3: 100%, VF: 5: 100%  Match phrase description to pic VF: 5: 100%    PN: 10/17/2023    Pictures and objects, F6: 70%. F4: 80%   NEW:   Pt will answer Y/N questions to general knowledge questions 90% of presentations.  1024/2023: Auditory comprehension: general knowledge yes/no: Initially having difficulty comprehending task. After initial 3 items, pt able to recall 88% accuracy.    PN: 10/17/2023  New   Education/Strategy Training  10/10/2023  Pt was able to match photo to written word in F4 this date.  Stating the words with Max A to produce first phoneme     Other 9/21/2023  Pt was able to participate in " conversation about Thelma's horses and pt was compliant with practicing target syllables with target phonemes throughout conversation and added to functional word list.    8/25/2023  POA reports suspect decline in cognition recently.  Encouraged to report to MD and encourage improving sleep and nutrition routines.      Functional words and names: 8.25.2023  Dick Hongley  Pasta  Annia  Water   I'm thirsty  I'm fine  Arm hurts  Legs hurts  Call me  I'm tired  Feed Loan.

## 2023-11-02 ENCOUNTER — HOSPITAL ENCOUNTER (OUTPATIENT)
Dept: SPEECH THERAPY | Age: 78
Setting detail: THERAPIES SERIES
Discharge: HOME | End: 2023-11-02
Attending: PHYSICAL MEDICINE & REHABILITATION
Payer: MEDICARE

## 2023-11-02 ENCOUNTER — HOSPITAL ENCOUNTER (OUTPATIENT)
Dept: OCCUPATIONAL THERAPY | Age: 78
Setting detail: THERAPIES SERIES
Discharge: HOME | End: 2023-11-02
Attending: PHYSICAL MEDICINE & REHABILITATION
Payer: MEDICARE

## 2023-11-02 DIAGNOSIS — I63.512 ACUTE ISCHEMIC LEFT MCA STROKE (CMS/HCC): ICD-10-CM

## 2023-11-02 DIAGNOSIS — I63.512 ACUTE ISCHEMIC LEFT MCA STROKE (CMS/HCC): Primary | ICD-10-CM

## 2023-11-02 DIAGNOSIS — I63.9 CEREBROVASCULAR ACCIDENT (CVA), UNSPECIFIED MECHANISM (CMS/HCC): Primary | ICD-10-CM

## 2023-11-02 DIAGNOSIS — R27.9 LACK OF COORDINATION: ICD-10-CM

## 2023-11-02 PROCEDURE — 97530 THERAPEUTIC ACTIVITIES: CPT | Mod: GO,59

## 2023-11-02 PROCEDURE — 92507 TX SP LANG VOICE COMM INDIV: CPT | Mod: GN,KX

## 2023-11-02 PROCEDURE — 97112 NEUROMUSCULAR REEDUCATION: CPT | Mod: GO,59

## 2023-11-02 ASSESSMENT — 9 HOLE PEG TEST: TEST_RESULT: 30.88

## 2023-11-02 NOTE — PROGRESS NOTES
Occupational Therapy Visit    OT DAILY NOTE FOR OUTPATIENT THERAPY    Patient: Jennifer Sams   MRN: [id number]  : 1945 78 y.o.  Referring Physician: Liv Haywood, Debra  Date of Visit: 2023      Certification Dates: 23 through 23    Diagnosis:   1. Acute ischemic left MCA stroke (CMS/HCC)    2. Lack of coordination        Chief Complaints:   Brain Injury    Precautions:       TODAY'S VISIT    Time In Session:  Start Time: 1305  Stop Time: 1400  Time Calculation (min): 55 min   History/Vitals/Pain/Encounter Info - 23 1311        Injury History/Precautions/Daily Required Info    Document Type daily treatment     Chief Complaint/Reason for Visit  Brain Injury     Onset of Illness/Injury or Date of Surgery 23     Referring Physician Dr. Haywood     History of present illness/functional impairment The patient is a 78-year-old  female with a history of AAA, heart block s/p cardiac pacemaker, glaucoma, hyperlipidemia, coronary artery disease, NSTEMI, who presented to [facility] on  after she has not been seen by her friends for a few days.  Patient lives alone and when she was found by EMS she was confused and combative.  In the emergency department she was aphasic with right-sided weakness and hypertensive to the 190 systolic.  CT scan of the head revealed an acute infarct in left MCA with mild bleeding.  Echo showed an EF of 43% and a bubble study was negative.  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  Later this was transitioned to apixaban.  She was continued on statin for secondary prevention.  She required virtual shivani while in the hospital but this was not effective.  She was evaluated by speech therapy and cleared for a soft and bite-size diet with moderately thick liquids.'     Patient/Family/Caregiver Comments/Observations Per Thelma, patient incorporating  RUE with carrying items     Patient reported fall since last visit No        Pain Assessment    Currently in pain No/Denies         Services    Do You Speak a Language Other Than English at Home? no                Daily Treatment Assessment and Plan - 11/02/23 1645        Daily Treatment Assessment and Plan    Progress toward goals Progressing     Daily Outcome Summary Patient was seen in OP OT for improved functional use of RUE and per caregiver Thelma, patient has been using RUE as functional assist with carrying light objects since last session. Fine motor control with removing pegs from pegboard retested today and Sharlene with improved from 48.6 seconds on 10/26/23 to 30.88 seconds today, indicating improving fine motor control at right hand. Patient challenged by gross and fine motor use of LUE with eyes occluded but able to grasp and hold light ball x 3 reps, and pass golf ball between hands with eyes occluded over 50 times with min dropping. She continues to demonstrate irritability and frustration with pace of recovery but engagement in therapeutic activity is good at this time.     Plan and Recommendations Incorporate rehabilitative and compensatory strategies for RUE sensory and motor recovery; frustration management                     OBJECTIVE DATA TAKEN TODAY    None Taken    Today's Treatment:      OT NEURO FLOW SHEET    EXERCISES  Initial Evaluation  Progress Note 1  Progress Note 2 CURRENT SESSION  8/16/2023  9/14/2023  10/23/23 TIME   NEURO RE-ED  CPT 90742 TOTAL TIME FOR SESSION 38-52 Minutes   AAROM/AROM PROM right RF/MF/IF composite flexion for improved ROM at right hand    Strengthening      Strength RUE lateral pinch, IF/MF press into soft theraputty      Gross Motor Control     Fine Motor Control Wooden peg removal from pegboard re-timed today (30.88 sec)    Sitting Jennifer/Balance     Standing Jennifer/Balance     Sensory re-education Right hand grasp and hold with eyes occluded  (stereognosis); incorporated light pegs and different sized therapy balls  - Added bilateral coordination exercise with good tolerance     Retraining     THERE ACT  CPT 62818 TOTAL TIME FOR SESSION 8-22 Minutes   Pain, Vitals, Meds, Etc. Vitals taken, pain assessed/monitored, Rest breaks provided for frustration management    Assessment     HEP 11/2/23: Review pinch strengthening positions today for improved carry over    10/26/23: Added soft theraputty for right hand pinch (lateral pinch, tip pinch, 3 jaw patti); patient expressed good understnading    Discussed carry over with use of SF splint    9/28/23: Laterality exercise added to HEP; patient and caregiver expressed good understanding    Right hand stretching    9/21/2023:   -Educated on use of rice/bean sensory bin, massage, sensory re-education with deep pressure and textures. Issued tubigrip for RUE sensory re-education and advised to wear 1x per day. Continued to discuss edema management.     Functional Mobility     Transfers     THERE EX  CPT 11992 TOTAL TIME FOR SESSION 0-7 Minutes   PROM     Activity Tolerance     SELF-CARE  CPT 55644 TOTAL TIME FOR SESSION 0-7 Minutes   Pt Education/Safety     ADL Training Reviewed    IADL Training     MODALITIES  CPT 23878 TOTAL TIME FOR SESSION Not performed   Ice/Heat     ATTENDED E-STIM  CPT 99674 TOTAL TIME FOR SESSION Not performed   Attended E-Stim     SPLINTING  CPT 01413 TOTAL TIME FOR SESSION Not performed   Fabrication/Check Out     Fit     Training     GROUP  CPT 92180 TOTAL TIME FOR SESSION Not performed             Normative Range for Female 75+ Years of Age       Right Hand   Left Hand      Initial Evaluation Progress Note 1 Norm Initial Evaluation Progress Note 1 Norm    Strength 0 lbs 0 lbs 42.6 lbs 30 lbs 30 lbs 37.6 lbs   Lateral Pinch N/T 2 lbs 12.6 lbs N/T 14 lbs 11.4 lbs   Three Jaw Patti N/T 2 lbs (assist for position) 12 lbs N/T 11 lbs 11.5 lbs   Tip Pinch N/T N/T 9.6 lbs N/T 9 lbs 9.3 lbs    Box and Block 4 blocks 22 blocks 65 blocks 50 blocks 56 blocks 63.6 blocks   Nine Hole Peg (71+ age) Unable to complete Unable to complete 22.49 seconds N/T 24.55 seconds 24.11 seconds

## 2023-11-02 NOTE — OP OT TREATMENT LOG
OT NEURO FLOW SHEET    EXERCISES  Initial Evaluation  Progress Note 1  Progress Note 2 CURRENT SESSION  8/16/2023  9/14/2023  10/23/23 TIME   NEURO RE-ED  CPT 12665 TOTAL TIME FOR SESSION 38-52 Minutes   AAROM/AROM PROM right RF/MF/IF composite flexion for improved ROM at right hand    Strengthening      Strength RUE lateral pinch, IF/MF press into soft theraputty      Gross Motor Control     Fine Motor Control Wooden peg removal from pegboard re-timed today (30.88 sec)    Sitting Jennifer/Balance     Standing Jennifer/Balance     Sensory re-education Right hand grasp and hold with eyes occluded (stereognosis); incorporated light pegs and different sized therapy balls  - Added bilateral coordination exercise with good tolerance     Retraining     THERE ACT  CPT 85193 TOTAL TIME FOR SESSION 8-22 Minutes   Pain, Vitals, Meds, Etc. Vitals taken, pain assessed/monitored, Rest breaks provided for frustration management    Assessment     HEP 11/2/23: Review pinch strengthening positions today for improved carry over    10/26/23: Added soft theraputty for right hand pinch (lateral pinch, tip pinch, 3 jaw patti); patient expressed good understnading    Discussed carry over with use of SF splint    9/28/23: Laterality exercise added to HEP; patient and caregiver expressed good understanding    Right hand stretching    9/21/2023:   -Educated on use of rice/bean sensory bin, massage, sensory re-education with deep pressure and textures. Issued tubigrip for RUE sensory re-education and advised to wear 1x per day. Continued to discuss edema management.     Functional Mobility     Transfers     THERE EX  CPT 69320 TOTAL TIME FOR SESSION 0-7 Minutes   PROM     Activity Tolerance     SELF-CARE  CPT 87663 TOTAL TIME FOR SESSION 0-7 Minutes   Pt Education/Safety     ADL Training Reviewed    IADL Training     MODALITIES  CPT 58827 TOTAL TIME FOR SESSION Not performed   Ice/Heat     ATTENDED E-STIM  CPT 26250 TOTAL TIME FOR SESSION Not  performed   Attended E-Stim     SPLINTING  CPT 90492 TOTAL TIME FOR SESSION Not performed   Fabrication/Check Out     Fit     Training     GROUP  CPT 32646 TOTAL TIME FOR SESSION Not performed             Normative Range for Female 75+ Years of Age       Right Hand   Left Hand      Initial Evaluation Progress Note 1 Norm Initial Evaluation Progress Note 1 Norm    Strength 0 lbs 0 lbs 42.6 lbs 30 lbs 30 lbs 37.6 lbs   Lateral Pinch N/T 2 lbs 12.6 lbs N/T 14 lbs 11.4 lbs   Three Jaw Yonatan N/T 2 lbs (assist for position) 12 lbs N/T 11 lbs 11.5 lbs   Tip Pinch N/T N/T 9.6 lbs N/T 9 lbs 9.3 lbs   Box and Block 4 blocks 22 blocks 65 blocks 50 blocks 56 blocks 63.6 blocks   Nine Hole Peg (71+ age) Unable to complete Unable to complete 22.49 seconds N/T 24.55 seconds 24.11 seconds

## 2023-11-03 NOTE — PROGRESS NOTES
Speech-Language Pathology Visit      SLP DAILY NOTE FOR OUTPATIENT THERAPY    Patient: Jennifer Sams   MRN: [id number]  : 1945 78 y.o.  Referring Physician: Liv Haywood, Debra  Date of Visit: 2023      Certification Dates: 23 through 23    Diagnosis:   1. Cerebrovascular accident (CVA), unspecified mechanism (CMS/HCC)    2. Acute ischemic left MCA stroke (CMS/HCC)        Chief Complaints:  apraxia    Precautions:  Precautions Comments: aphasia, apraxia, pacemaker       TODAY'S VISIT:    Session Time:  Start Time: 1400  Stop Time: 1500  Time Calculation (min): 60 min   History/Vitals/Pain/Encounter Info - 23 1302        Injury History/Precautions/Daily Required Info    Primary Therapist Magnolia Lundy MS,CCC/SLP     Chief Complaint/Reason for Visit  apraxia     Onset of Illness/Injury or Date of Surgery 23     Referring Physician Dr. Haywood     Precautions Comments aphasia, apraxia, pacemaker     Limitations/Impairments safety/cognitive     Patient reported fall since last visit No        Pain Assessment    Currently in pain No/Denies        Pain Interventions    Intervention none     Post Intervention Comments none                Daily Treatment Assessment and Plan - 23 1302        Daily Treatment Assessment and Plan    Progress toward goals Progressing     Daily Outcome Summary Improved rate of photo ID in F4, improved skill ident present progressive.     Plan and Recommendations Continue with receptive goals and naming with fading cues for articulatory accuracy.  Proble for new goals                  OBJECTIVE MEASUREMENTS TAKEN TODAY:    None Taken    Today's Treatment:    MOTOR/PRODUCTION SLP FLOW SHEET    SKILL AREA CURRENT SESSION   SPEECH PRODUCTION (MOTOR)  CPT 75288    Pt will produce bilabials /p, b, m, w/, tip alveolars /t, d, n/ and tense vowels /a, e, I, o,u/ in isolation provided mod A, 80% of trials.     2023- MET 2023  Pt  "stimulable for /p,b,m,w,d,n/ and tense vowels in initial position and syllables. Pt continues to exhibit difficulty with /t/. Mod A.     10/26/2023:  Attempted in isolation but more difficult than below. Pt able to press lips together with verbal cue and model and was able to produce a /p/ in insolation x 1.    PN: 10/17/2023  Partially Met   Pt will produce VC and CV syllables provided the above phonemes, Mod A, 80% of trials 11/2/2023  See above    10/26/2023:  /b/ IP CV and CVC: on 1st attempt after SLP model:   3/5 (60%) on 1st attempt, 5/5 with moderate prompting, visual/verbal cues  /p/ in IP CVC/CVCV: on 1st attempt after SLP model:   1/5 (20%), able to produce elsewhere x 1 with max cues (/p/ --> /b/). Coarticulation with vowel /o/ most successful with /p/.   /m/ in IP of CVC/CVCV: on 1st attempt after SLP model:  3/5 (60%), able to produce 5/5 with mod cues (\"press lips together\").    PN: 10/17/2023  Partially Met   Pt will produce single and bisyllablic words consisting of CVCV, VCV and CVC, Mod A, 80% of presentations. 11/2/2023  Workbook for word retrieval.  p 10 Provided pt with mod A for initial position visual cues, pt is able to produce 1-2 syllable words.     10/26/2023:  Given picture with various \"things wrong\" (BCAT KPT- of note, pt pointed to all wrong things 4/4 (I))- pt able to spontaneously say: \"ilk\" for milk, \"break\" for broken plate, \"hot\" for hot pan, \"apple\" and \"fall\" for items in fridge.     Given action pictures (LARK): pt able to produce at least a single recognizable word spontaneously for approximately 50% of pictures. Words often marked by initial consonant deletion. Pt with emerging /s/ in initial position- \"salt\", \"sea\", \"sock\".    PN: 10/17/2023  Partially Met   Pt will produce simple automatics provided mod A:  1-10  A-G  Yolis and identifiable syllable combinations for MALLY 11/2/2023 1-10 (Min A)   Mon-Sat (unison)  January- December (min A)    10/24/2023:  Automatics: in " "unison with SLP: 3/10 first attempt, increased rate. Provided visual cue and pacing with unison, up to 8/10 upon 1 attempt.  Ar: Pt able to imitate 2 beat intonation ar, max (A) to apply paired hand gesture. Paired phrase \"I'm fine\" able to match ar and produce close approximation - \"I'm ---ine\".   Pt able to \"hum\" Happy Birthday ar in full as \"Na-na-na-na\"    PN: 10/17/2023  Continue  10/5/2023  1-10 using apraxia ted.     9/21/2023  1-11, missed 5&6  1-10, missed 5&8  A-D,   A-E    9/6/2023  Approximated for 70% of 1-10   Pt will produce small set of functional words/phrases in unison or in imitation with SLP, then produce 3  successive attempts with 80% accuracy  11/2/2023  Provided visual cues for first phoneme and direct model and/or reading cue, pt is improving with familiar words and phrases.    10/24/2023:  Repeat functional word list:  13/25 able to produce close, recognizable approximations in context. Often missing or approximating initial syllable.    High transparency phrase completion: 4/10     PN: 10/17/2023  Continue    10/10/2023  Pt provided a key ring of 9 functional phrases to practice independently.      9/22/23: Produced 9/14 functional words producing all phonemes. Frustrated throughout.    9/6/2023  Produced 8/14 functional words producing all phonemes.     8/25/2023  Established list of functional words, names. See below.  Pt stimulable for 5/16.  Approximations for remainder.   Noted with difficulty with velars, blends in particular.     The pt will demonstrate understanding of 1 noun in simple and present and progressive sentences F4, 80% accy.   (Advanced language L1-2 Identify)      11/2/2023  90% (I)   Provided pictures/objects, and/or body parts, pt will demonstrate  understanding of \"point to___\" 85% of trials.     Revise: Pictures, letters identified by phoneme and letter.      10/24/2023:  Aud discrim VF: 4 pictures: Point to the...: 4/4, 4/4, 4/4: 100%  Reading " single word matching: VF: 3: 100%, VF: 5: 100%  Match phrase description to pic VF: 5: 100%    PN: 10/17/2023    Pictures and objects, F6: 70%. F4: 80%   NEW:   Pt will answer Y/N questions to general knowledge questions 90% of presentations.  11/2/2023  11/15    1024/2023: Auditory comprehension: general knowledge yes/no: Initially having difficulty comprehending task. After initial 3 items, pt able to recall 88% accuracy.    PN: 10/17/2023  New   Education/Strategy Training  10/10/2023  Pt was able to match photo to written word in F4 this date.  Stating the words with Max A to produce first phoneme     Other 9/21/2023  Pt was able to participate in conversation about Thelma's horses and pt was compliant with practicing target syllables with target phonemes throughout conversation and added to functional word list.    8/25/2023  POA reports suspect decline in cognition recently.  Encouraged to report to MD and encourage improving sleep and nutrition routines.      Functional words and names: 8.25.2023  Dick Milner  Water   I'm thirsty  I'm fine  Arm hurts  Legs hurts  Call me  I'm tired  [employer].

## 2023-11-03 NOTE — OP SLP TREATMENT LOG
"MOTOR/PRODUCTION SLP FLOW SHEET    SKILL AREA CURRENT SESSION   SPEECH PRODUCTION (MOTOR)  CPT 83890    Pt will produce bilabials /p, b, m, w/, tip alveolars /t, d, n/ and tense vowels /a, e, I, o,u/ in isolation provided mod A, 80% of trials.     11/2/2023- MET 11/2/2023  Pt stimulable for /p,b,m,w,d,n/ and tense vowels in initial position and syllables. Pt continues to exhibit difficulty with /t/. Mod A.     10/26/2023:  Attempted in isolation but more difficult than below. Pt able to press lips together with verbal cue and model and was able to produce a /p/ in insolation x 1.    PN: 10/17/2023  Partially Met   Pt will produce VC and CV syllables provided the above phonemes, Mod A, 80% of trials 11/2/2023  See above    10/26/2023:  /b/ IP CV and CVC: on 1st attempt after SLP model:   3/5 (60%) on 1st attempt, 5/5 with moderate prompting, visual/verbal cues  /p/ in IP CVC/CVCV: on 1st attempt after SLP model:   1/5 (20%), able to produce elsewhere x 1 with max cues (/p/ --> /b/). Coarticulation with vowel /o/ most successful with /p/.   /m/ in IP of CVC/CVCV: on 1st attempt after SLP model:  3/5 (60%), able to produce 5/5 with mod cues (\"press lips together\").    PN: 10/17/2023  Partially Met   Pt will produce single and bisyllablic words consisting of CVCV, VCV and CVC, Mod A, 80% of presentations. 11/2/2023  Workbook for word retrieval.  p 10 Provided pt with mod A for initial position visual cues, pt is able to produce 1-2 syllable words.     10/26/2023:  Given picture with various \"things wrong\" (BCAT KPT- of note, pt pointed to all wrong things 4/4 (I))- pt able to spontaneously say: \"ilk\" for milk, \"break\" for broken plate, \"hot\" for hot pan, \"apple\" and \"fall\" for items in fridge.     Given action pictures (MECHELLE): pt able to produce at least a single recognizable word spontaneously for approximately 50% of pictures. Words often marked by initial consonant deletion. Pt with emerging /s/ in initial position- " "\"salt\", \"sea\", \"sock\".    PN: 10/17/2023  Partially Met   Pt will produce simple automatics provided mod A:  1-10  A-G  Ar and identifiable syllable combinations for MALLY 11/2/2023  1-10 (Min A)   Mon-Sat (unison)  January- December (min A)    10/24/2023:  Automatics: in unison with SLP: 3/10 first attempt, increased rate. Provided visual cue and pacing with unison, up to 8/10 upon 1 attempt.  Ar: Pt able to imitate 2 beat intonation ar, max (A) to apply paired hand gesture. Paired phrase \"I'm fine\" able to match ar and produce close approximation - \"I'm ---ine\".   Pt able to \"hum\" Happy Birthday ar in full as \"Na-na-na-na\"    PN: 10/17/2023  Continue  10/5/2023  1-10 using apraxia ted.     9/21/2023  1-11, missed 5&6  1-10, missed 5&8  A-D,   A-E    9/6/2023  Approximated for 70% of 1-10   Pt will produce small set of functional words/phrases in unison or in imitation with SLP, then produce 3  successive attempts with 80% accuracy  11/2/2023  Provided visual cues for first phoneme and direct model and/or reading cue, pt is improving with familiar words and phrases.    10/24/2023:  Repeat functional word list:  13/25 able to produce close, recognizable approximations in context. Often missing or approximating initial syllable.    High transparency phrase completion: 4/10     PN: 10/17/2023  Continue    10/10/2023  Pt provided a key ring of 9 functional phrases to practice independently.      9/22/23: Produced 9/14 functional words producing all phonemes. Frustrated throughout.    9/6/2023  Produced 8/14 functional words producing all phonemes.     8/25/2023  Established list of functional words, names. See below.  Pt stimulable for 5/16.  Approximations for remainder.   Noted with difficulty with velars, blends in particular.     The pt will demonstrate understanding of 1 noun in simple and present and progressive sentences F4, 80% accy.   (Advanced language L1-2 Identify)      11/2/2023  90% (I) " "  Provided pictures/objects, and/or body parts, pt will demonstrate  understanding of \"point to___\" 85% of trials.     Revise: Pictures, letters identified by phoneme and letter.      10/24/2023:  Aud discrim VF: 4 pictures: Point to the...: 4/4, 4/4, 4/4: 100%  Reading single word matching: VF: 3: 100%, VF: 5: 100%  Match phrase description to pic VF: 5: 100%    PN: 10/17/2023    Pictures and objects, F6: 70%. F4: 80%   NEW:   Pt will answer Y/N questions to general knowledge questions 90% of presentations.  11/2/2023  11/15    1024/2023: Auditory comprehension: general knowledge yes/no: Initially having difficulty comprehending task. After initial 3 items, pt able to recall 88% accuracy.    PN: 10/17/2023  New   Education/Strategy Training  10/10/2023  Pt was able to match photo to written word in F4 this date.  Stating the words with Max A to produce first phoneme     Other 9/21/2023  Pt was able to participate in conversation about Thelma's horses and pt was compliant with practicing target syllables with target phonemes throughout conversation and added to functional word list.    8/25/2023  POA reports suspect decline in cognition recently.  Encouraged to report to MD and encourage improving sleep and nutrition routines.      Functional words and names: 8.25.2023  Dick Montes  Annia  Water   I'm thirsty  I'm fine  Arm hurts  Legs hurts  Call me  I'm tired  [employer].        "

## 2023-11-03 NOTE — PROGRESS NOTES
Speech-Language Pathology Visit      SLP DAILY NOTE FOR OUTPATIENT THERAPY    Patient: Jennifer Sams   MRN: [id number]  : 1945 78 y.o.  Referring Physician: Liv Haywood, Debra  Date of Visit: 2023      Certification Dates: 23 through 23    Diagnosis:   1. Cerebrovascular accident (CVA), unspecified mechanism (CMS/HCC)    2. Acute ischemic left MCA stroke (CMS/HCC)        Chief Complaints:  apraxia    Precautions:  Precautions Comments: aphasia, apraxia, pacemaker       TODAY'S VISIT:    Session Time:  Start Time: 1400  Stop Time: 1500  Time Calculation (min): 60 min   History/Vitals/Pain/Encounter Info - 23 1302        Injury History/Precautions/Daily Required Info    Primary Therapist Magnolia Lundy MS,CCC/SLP     Chief Complaint/Reason for Visit  apraxia     Onset of Illness/Injury or Date of Surgery 23     Referring Physician Dr. Haywood     Precautions Comments aphasia, apraxia, pacemaker     Limitations/Impairments safety/cognitive     Patient reported fall since last visit No        Pain Assessment    Currently in pain No/Denies        Pain Interventions    Intervention none     Post Intervention Comments none                Daily Treatment Assessment and Plan - 23 1302        Daily Treatment Assessment and Plan    Progress toward goals Progressing     Daily Outcome Summary Improved rate of photo ID in F4, improved skill ident present progressive.     Plan and Recommendations Continue with receptive goals and naming with fading cues for articulatory accuracy.  Proble for new goals                  OBJECTIVE MEASUREMENTS TAKEN TODAY:    None Taken    Today's Treatment:    MOTOR/PRODUCTION SLP FLOW SHEET    SKILL AREA CURRENT SESSION   SPEECH PRODUCTION (MOTOR)  CPT 80360    Pt will produce bilabials /p, b, m, w/, tip alveolars /t, d, n/ and tense vowels /a, e, I, o,u/ in isolation provided mod A, 80% of trials.     2023- MET 2023  Pt  "stimulable for /p,b,m,w,d,n/ and tense vowels in initial position and syllables. Pt continues to exhibit difficulty with /t/. Mod A.     10/26/2023:  Attempted in isolation but more difficult than below. Pt able to press lips together with verbal cue and model and was able to produce a /p/ in insolation x 1.    PN: 10/17/2023  Partially Met   Pt will produce VC and CV syllables provided the above phonemes, Mod A, 80% of trials 11/2/2023  See above    10/26/2023:  /b/ IP CV and CVC: on 1st attempt after SLP model:   3/5 (60%) on 1st attempt, 5/5 with moderate prompting, visual/verbal cues  /p/ in IP CVC/CVCV: on 1st attempt after SLP model:   1/5 (20%), able to produce elsewhere x 1 with max cues (/p/ --> /b/). Coarticulation with vowel /o/ most successful with /p/.   /m/ in IP of CVC/CVCV: on 1st attempt after SLP model:  3/5 (60%), able to produce 5/5 with mod cues (\"press lips together\").    PN: 10/17/2023  Partially Met   Pt will produce single and bisyllablic words consisting of CVCV, VCV and CVC, Mod A, 80% of presentations. 11/2/2023  Workbook for word retrieval.  p 10 Provided pt with mod A for initial position visual cues, pt is able to produce 1-2 syllable words.     10/26/2023:  Given picture with various \"things wrong\" (BCAT KPT- of note, pt pointed to all wrong things 4/4 (I))- pt able to spontaneously say: \"ilk\" for milk, \"break\" for broken plate, \"hot\" for hot pan, \"apple\" and \"fall\" for items in fridge.     Given action pictures (LARK): pt able to produce at least a single recognizable word spontaneously for approximately 50% of pictures. Words often marked by initial consonant deletion. Pt with emerging /s/ in initial position- \"salt\", \"sea\", \"sock\".    PN: 10/17/2023  Partially Met   Pt will produce simple automatics provided mod A:  1-10  A-G  Yolis and identifiable syllable combinations for MALLY 11/2/2023 1-10 (Min A)   Mon-Sat (unison)  January- December (min A)    10/24/2023:  Automatics: in " "unison with SLP: 3/10 first attempt, increased rate. Provided visual cue and pacing with unison, up to 8/10 upon 1 attempt.  Ar: Pt able to imitate 2 beat intonation ar, max (A) to apply paired hand gesture. Paired phrase \"I'm fine\" able to match ar and produce close approximation - \"I'm ---ine\".   Pt able to \"hum\" Happy Birthday ar in full as \"Na-na-na-na\"    PN: 10/17/2023  Continue  10/5/2023  1-10 using apraxia ted.     9/21/2023  1-11, missed 5&6  1-10, missed 5&8  A-D,   A-E    9/6/2023  Approximated for 70% of 1-10   Pt will produce small set of functional words/phrases in unison or in imitation with SLP, then produce 3  successive attempts with 80% accuracy  11/2/2023  Provided visual cues for first phoneme and direct model and/or reading cue, pt is improving with familiar words and phrases.    10/24/2023:  Repeat functional word list:  13/25 able to produce close, recognizable approximations in context. Often missing or approximating initial syllable.    High transparency phrase completion: 4/10     PN: 10/17/2023  Continue    10/10/2023  Pt provided a key ring of 9 functional phrases to practice independently.      9/22/23: Produced 9/14 functional words producing all phonemes. Frustrated throughout.    9/6/2023  Produced 8/14 functional words producing all phonemes.     8/25/2023  Established list of functional words, names. See below.  Pt stimulable for 5/16.  Approximations for remainder.   Noted with difficulty with velars, blends in particular.     The pt will demonstrate understanding of 1 noun in simple and present and progressive sentences F4, 80% accy.   (Advanced language L1-2 Identify)      11/2/2023  90% (I)   Provided pictures/objects, and/or body parts, pt will demonstrate  understanding of \"point to___\" 85% of trials.     Revise: Pictures, letters identified by phoneme and letter.      10/24/2023:  Aud discrim VF: 4 pictures: Point to the...: 4/4, 4/4, 4/4: 100%  Reading " single word matching: VF: 3: 100%, VF: 5: 100%  Match phrase description to pic VF: 5: 100%    PN: 10/17/2023    Pictures and objects, F6: 70%. F4: 80%   NEW:   Pt will answer Y/N questions to general knowledge questions 90% of presentations.  11/2/2023  11/15    1024/2023: Auditory comprehension: general knowledge yes/no: Initially having difficulty comprehending task. After initial 3 items, pt able to recall 88% accuracy.    PN: 10/17/2023  New   Education/Strategy Training  10/10/2023  Pt was able to match photo to written word in F4 this date.  Stating the words with Max A to produce first phoneme     Other 9/21/2023  Pt was able to participate in conversation about [employer]lma's horses and pt was compliant with practicing target syllables with target phonemes throughout conversation and added to functional word list.    8/25/2023  POA reports suspect decline in cognition recently.  Encouraged to report to MD and encourage improving sleep and nutrition routines.      Functional words and names: 8.25.2023  Dick Milner  Water   I'm thirsty  I'm fine  Arm hurts  Legs hurts  Call me  I'm tired  [employer].

## 2023-11-07 ENCOUNTER — HOSPITAL ENCOUNTER (OUTPATIENT)
Dept: OCCUPATIONAL THERAPY | Age: 78
Setting detail: THERAPIES SERIES
Discharge: HOME | End: 2023-11-07
Attending: PHYSICAL MEDICINE & REHABILITATION
Payer: MEDICARE

## 2023-11-07 ENCOUNTER — HOSPITAL ENCOUNTER (OUTPATIENT)
Dept: SPEECH THERAPY | Age: 78
Setting detail: THERAPIES SERIES
Discharge: HOME | End: 2023-11-07
Attending: PHYSICAL MEDICINE & REHABILITATION
Payer: MEDICARE

## 2023-11-07 DIAGNOSIS — R27.9 LACK OF COORDINATION: ICD-10-CM

## 2023-11-07 DIAGNOSIS — I63.512 ACUTE ISCHEMIC LEFT MCA STROKE (CMS/HCC): Primary | ICD-10-CM

## 2023-11-07 DIAGNOSIS — I63.512 ACUTE ISCHEMIC LEFT MCA STROKE (CMS/HCC): ICD-10-CM

## 2023-11-07 DIAGNOSIS — I63.9 CEREBROVASCULAR ACCIDENT (CVA), UNSPECIFIED MECHANISM (CMS/HCC): Primary | ICD-10-CM

## 2023-11-07 PROCEDURE — 92507 TX SP LANG VOICE COMM INDIV: CPT | Mod: GN

## 2023-11-07 PROCEDURE — 97530 THERAPEUTIC ACTIVITIES: CPT | Mod: GO,KX,59

## 2023-11-07 PROCEDURE — 97535 SELF CARE MNGMENT TRAINING: CPT | Mod: GO,KX

## 2023-11-07 PROCEDURE — 97112 NEUROMUSCULAR REEDUCATION: CPT | Mod: GO,KX,59

## 2023-11-07 NOTE — OP SLP TREATMENT LOG
"MOTOR/PRODUCTION SLP FLOW SHEET    SKILL AREA CURRENT SESSION   SPEECH PRODUCTION (MOTOR)  CPT 97504    Pt will produce bilabials /p, b, m, w/, tip alveolars /t, d, n/ and tense vowels /a, e, I, o,u/ in isolation provided mod A, 80% of trials.     11/2/2023- MET 11/2/2023  Pt stimulable for /p,b,m,w,d,n/ and tense vowels in initial position and syllables. Pt continues to exhibit difficulty with /t/. Mod A.     10/26/2023:  Attempted in isolation but more difficult than below. Pt able to press lips together with verbal cue and model and was able to produce a /p/ in insolation x 1.    PN: 10/17/2023  Partially Met   Pt will produce VC and CV syllables provided the above phonemes, Mod A, 80% of trials 11/7/2023  Pt continues to have difficulty with /t,p, f, v, y/   11/2/2023  See above    10/26/2023:  /b/ IP CV and CVC: on 1st attempt after SLP model:   3/5 (60%) on 1st attempt, 5/5 with moderate prompting, visual/verbal cues  /p/ in IP CVC/CVCV: on 1st attempt after SLP model:   1/5 (20%), able to produce elsewhere x 1 with max cues (/p/ --> /b/). Coarticulation with vowel /o/ most successful with /p/.   /m/ in IP of CVC/CVCV: on 1st attempt after SLP model:  3/5 (60%), able to produce 5/5 with mod cues (\"press lips together\").    PN: 10/17/2023  Partially Met   Pt will produce single and bisyllablic words consisting of CVCV, VCV and CVC, Mod A, 80% of presentations. 11/2/2023  Workbook for word retrieval.  p 10 Provided pt with mod A for initial position visual cues, pt is able to produce 1-2 syllable words.     10/26/2023:  Given picture with various \"things wrong\" (BCAT KPT- of note, pt pointed to all wrong things 4/4 (I))- pt able to spontaneously say: \"ilk\" for milk, \"break\" for broken plate, \"hot\" for hot pan, \"apple\" and \"fall\" for items in fridge.     Given action pictures (MECHELLE): pt able to produce at least a single recognizable word spontaneously for approximately 50% of pictures. Words often marked by " "initial consonant deletion. Pt with emerging /s/ in initial position- \"salt\", \"sea\", \"sock\".    PN: 10/17/2023  Partially Met   Pt will produce simple automatics provided mod A:  1-10  A-G  Ar and identifiable syllable combinations for MALLY 11/7/2023  1-13 (s)  Mon-Sat (unison)  January- December (mod A) Unison and VC    11/2/2023  1-10 (Min A)   Mon-Sat (unison)  January- December (min A)    10/24/2023:  Automatics: in unison with SLP: 3/10 first attempt, increased rate. Provided visual cue and pacing with unison, up to 8/10 upon 1 attempt.  Ar: Pt able to imitate 2 beat intonation ar, max (A) to apply paired hand gesture. Paired phrase \"I'm fine\" able to match ar and produce close approximation - \"I'm ---ine\".   Pt able to \"hum\" Happy Birthday ar in full as \"Na-na-na-na\"    PN: 10/17/2023  Continue  10/5/2023  1-10 using apraxia ted.     9/21/2023  1-11, missed 5&6  1-10, missed 5&8  A-D,   A-E    9/6/2023  Approximated for 70% of 1-10   Pt will produce small set of functional words/phrases in unison or in imitation with SLP, then produce 3  successive attempts with 80% accuracy  11/2/2023  Provided visual cues for first phoneme and direct model and/or reading cue, pt is improving with familiar words and phrases.    10/24/2023:  Repeat functional word list:  13/25 able to produce close, recognizable approximations in context. Often missing or approximating initial syllable.    High transparency phrase completion: 4/10     PN: 10/17/2023  Continue    10/10/2023  Pt provided a key ring of 9 functional phrases to practice independently.      9/22/23: Produced 9/14 functional words producing all phonemes. Frustrated throughout.    9/6/2023  Produced 8/14 functional words producing all phonemes.     8/25/2023  Established list of functional words, names. See below.  Pt stimulable for 5/16.  Approximations for remainder.   Noted with difficulty with velars, blends in particular.     The pt will " "demonstrate understanding of 1 noun in simple and present and progressive sentences F4, 80% accy.   (Advanced language L1-2 Identify)   Noun, simple present- met  Noun, present progressive- practice  2 nouns, nonreversible)         11/7/2023  100% (I),     11/2/2023  90% (I)   Provided pictures/objects, and/or body parts, pt will demonstrate  understanding of \"point to___\" 85% of trials.     Revise: Pictures, letters identified by phoneme and letter.      11/7/2023  Point to letter: Field of letters- unable- became frustrated  Understand words you hear: 100%  Picture rhymes:50%     10/24/2023:  Aud discrim VF: 4 pictures: Point to the...: 4/4, 4/4, 4/4: 100%  Reading single word matching: VF: 3: 100%, VF: 5: 100%  Match phrase description to pic VF: 5: 100%    PN: 10/17/2023    Pictures and objects, F6: 70%. F4: 80%   NEW:   Pt will answer Y/N questions to general knowledge questions 90% of presentations.  11/7/2023 20/22- 91%  Y/N to picture features: 100%    11/2/2023  11/15    1024/2023: Auditory comprehension: general knowledge yes/no: Initially having difficulty comprehending task. After initial 3 items, pt able to recall 88% accuracy.    PN: 10/17/2023  New   Education/Strategy Training  10/10/2023  Pt was able to match photo to written word in F4 this date.  Stating the words with Max A to produce first phoneme     Other 9/21/2023  Pt was able to participate in conversation about Thelma's horses and pt was compliant with practicing target syllables with target phonemes throughout conversation and added to functional word list.    8/25/2023  POA reports suspect decline in cognition recently.  Encouraged to report to MD and encourage improving sleep and nutrition routines.      Functional words and names: 8.25.2023  Dick Hongley  Pasta  Annia  Water   I'm thirsty  I'm fine  Arm hurts  Legs hurts  Call me  I'm tired  Feed Loan.        "

## 2023-11-07 NOTE — OP OT TREATMENT LOG
OT NEURO FLOW SHEET    EXERCISES  Initial Evaluation  Progress Note 1  Progress Note 2 CURRENT SESSION  8/16/2023  9/14/2023  10/23/23 TIME   NEURO RE-ED  CPT 01827 TOTAL TIME FOR SESSION 8-22 Minutes   AAROM/AROM PROM right RF/MF/IF composite flexion for improved ROM at right hand    Strengthening      Strength       Gross Motor Control     Fine Motor Control     Sitting Jennifer/Balance     Standing Jennifer/Balance     Sensory re-education Right hand grasp and hold with eyes occluded (stereognosis); incorporated light and heavy objects and different sized therapy balls       Retraining     THERE ACT  CPT 43280 TOTAL TIME FOR SESSION 8-22 Minutes   Pain, Vitals, Meds, Etc. Vitals taken, pain assessed/monitored, Rest breaks provided for frustration management    Assessment     HEP 11/2/23: Review pinch strengthening positions today for improved carry over    10/26/23: Added soft theraputty for right hand pinch (lateral pinch, tip pinch, 3 jaw patti); patient expressed good understnading    Discussed carry over with use of SF splint    9/28/23: Laterality exercise added to HEP; patient and caregiver expressed good understanding    Right hand stretching    9/21/2023:   -Educated on use of rice/bean sensory bin, massage, sensory re-education with deep pressure and textures. Issued tubigrip for RUE sensory re-education and advised to wear 1x per day. Continued to discuss edema management.     Functional Mobility     Transfers     THERE EX  CPT 31680 TOTAL TIME FOR SESSION 0-7 Minutes   PROM     Activity Tolerance     SELF-CARE  CPT 10804 TOTAL TIME FOR SESSION 23-37 Minutes   Pt Education/Safety     ADL Training Therapist facilitated simulated dressing and bathing activities today for increased use of RUE with drying after shower and donning clothes. Patient requires mod verbal and physical cueing to perform as instructed today.    IADL Training     MODALITIES  CPT 93585 TOTAL TIME FOR SESSION Not performed   Ice/Heat      ATTENDED E-STIM  CPT 18047 TOTAL TIME FOR SESSION Not performed   Attended E-Stim     SPLINTING  CPT 74650 TOTAL TIME FOR SESSION Not performed   Fabrication/Check Out     Fit     Training     GROUP  CPT 58106 TOTAL TIME FOR SESSION Not performed             Normative Range for Female 75+ Years of Age       Right Hand   Left Hand      Initial Evaluation Progress Note 1 Norm Initial Evaluation Progress Note 1 Norm    Strength 0 lbs 0 lbs 42.6 lbs 30 lbs 30 lbs 37.6 lbs   Lateral Pinch N/T 2 lbs 12.6 lbs N/T 14 lbs 11.4 lbs   Three Jaw Yonatan N/T 2 lbs (assist for position) 12 lbs N/T 11 lbs 11.5 lbs   Tip Pinch N/T N/T 9.6 lbs N/T 9 lbs 9.3 lbs   Box and Block 4 blocks 22 blocks 65 blocks 50 blocks 56 blocks 63.6 blocks   Nine Hole Peg (71+ age) Unable to complete Unable to complete 22.49 seconds N/T 24.55 seconds 24.11 seconds

## 2023-11-07 NOTE — PROGRESS NOTES
Occupational Therapy Visit    OT DAILY NOTE FOR OUTPATIENT THERAPY    Patient: Jennifer Sams   MRN: [id number]  : 1945 78 y.o.  Referring Physician: Liv Haywood, Debra  Date of Visit: 2023      Certification Dates: 23 through 23    Diagnosis:   1. Acute ischemic left MCA stroke (CMS/HCC)    2. Lack of coordination        Chief Complaints:   Brain Injury    Precautions:       TODAY'S VISIT    Time In Session:  Start Time: 1405  Stop Time: 1500  Time Calculation (min): 55 min   History/Vitals/Pain/Encounter Info - 23 1719        Injury History/Precautions/Daily Required Info    Document Type daily treatment     Primary Therapist Barry Rosas OTR/CARYN     Chief Complaint/Reason for Visit  Brain Injury     Onset of Illness/Injury or Date of Surgery 23     Referring Physician Dr. Haywood     History of present illness/functional impairment The patient is a 78-year-old  female with a history of AAA, heart block s/p cardiac pacemaker, glaucoma, hyperlipidemia, coronary artery disease, NSTEMI, who presented to [facility] on  after she has not been seen by her friends for a few days.  Patient lives alone and when she was found by EMS she was confused and combative.  In the emergency department she was aphasic with right-sided weakness and hypertensive to the 190 systolic.  CT scan of the head revealed an acute infarct in left MCA with mild bleeding.  Echo showed an EF of 43% and a bubble study was negative.  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  Later this was transitioned to apixaban.  She was continued on statin for secondary prevention.  She required virtual shivani while in the hospital but this was not effective.  She was evaluated by speech therapy and cleared for a soft and bite-size diet with moderately thick liquids.'     Patient/Family/Caregiver  Comments/Observations Per caregiver Thelma, patient with use of right hand while washing hair last week     Patient reported fall since last visit No        Pain Assessment    Currently in pain No/Denies         Services    Do You Speak a Language Other Than English at Home? no                Daily Treatment Assessment and Plan - 11/07/23 1719        Daily Treatment Assessment and Plan    Progress toward goals Progressing     Daily Outcome Summary Patient seen in OP OT accompanied by caregiver who reports improved bilateral UE use with self care (i.e. bathing). Patient demonstrates improved ability to grasp and hold towel today for drying off after shower using both hands; she demonstrates ability to perform lateral pinch to yahaira pants and encouraged to carry over with dressing. Patient requires mod verbal and physical cueing today to perform ADL retraining strategies as instructed and with frustration due to aphasia. She demonstrates improving stereognosis at right hand with differentiated size of items with eyes occluded. Patient is progressing with functional use of RUE at this time.     Plan and Recommendations Incorporate rehabilitative and compensatory strategies for RUE sensory and motor recovery; frustration management                     OBJECTIVE DATA TAKEN TODAY    None Taken    Today's Treatment:      OT NEURO FLOW SHEET    EXERCISES  Initial Evaluation  Progress Note 1  Progress Note 2 CURRENT SESSION  8/16/2023  9/14/2023  10/23/23 TIME   NEURO RE-ED  CPT 43444 TOTAL TIME FOR SESSION 8-22 Minutes   AAROM/AROM PROM right RF/MF/IF composite flexion for improved ROM at right hand    Strengthening      Strength       Gross Motor Control     Fine Motor Control     Sitting Jennifer/Balance     Standing Jennifer/Balance     Sensory re-education Right hand grasp and hold with eyes occluded (stereognosis); incorporated light and heavy objects and different sized therapy balls       Retraining     THERE  ACT  CPT 11429 TOTAL TIME FOR SESSION 8-22 Minutes   Pain, Vitals, Meds, Etc. Vitals taken, pain assessed/monitored, Rest breaks provided for frustration management    Assessment     HEP 11/2/23: Review pinch strengthening positions today for improved carry over    10/26/23: Added soft theraputty for right hand pinch (lateral pinch, tip pinch, 3 jaw patti); patient expressed good understnading    Discussed carry over with use of SF splint    9/28/23: Laterality exercise added to HEP; patient and caregiver expressed good understanding    Right hand stretching    9/21/2023:   -Educated on use of rice/bean sensory bin, massage, sensory re-education with deep pressure and textures. Issued tubigrip for RUE sensory re-education and advised to wear 1x per day. Continued to discuss edema management.     Functional Mobility     Transfers     THERE EX  CPT 01191 TOTAL TIME FOR SESSION 0-7 Minutes   PROM     Activity Tolerance     SELF-CARE  CPT 21900 TOTAL TIME FOR SESSION 23-37 Minutes   Pt Education/Safety     ADL Training Therapist facilitated simulated dressing and bathing activities today for increased use of RUE with drying after shower and donning clothes. Patient requires mod verbal and physical cueing to perform as instructed today.    IADL Training     MODALITIES  CPT 90136 TOTAL TIME FOR SESSION Not performed   Ice/Heat     ATTENDED E-STIM  CPT 57782 TOTAL TIME FOR SESSION Not performed   Attended E-Stim     SPLINTING  CPT 83417 TOTAL TIME FOR SESSION Not performed   Fabrication/Check Out     Fit     Training     GROUP  CPT 35580 TOTAL TIME FOR SESSION Not performed             Normative Range for Female 75+ Years of Age       Right Hand   Left Hand      Initial Evaluation Progress Note 1 Norm Initial Evaluation Progress Note 1 Norm    Strength 0 lbs 0 lbs 42.6 lbs 30 lbs 30 lbs 37.6 lbs   Lateral Pinch N/T 2 lbs 12.6 lbs N/T 14 lbs 11.4 lbs   Three Jaw Patti N/T 2 lbs (assist for position) 12 lbs N/T 11 lbs  11.5 lbs   Tip Pinch N/T N/T 9.6 lbs N/T 9 lbs 9.3 lbs   Box and Block 4 blocks 22 blocks 65 blocks 50 blocks 56 blocks 63.6 blocks   Nine Hole Peg (71+ age) Unable to complete Unable to complete 22.49 seconds N/T 24.55 seconds 24.11 seconds

## 2023-11-07 NOTE — PROGRESS NOTES
Speech-Language Pathology Visit      SLP DAILY NOTE FOR OUTPATIENT THERAPY    Patient: Jennifer Sams   MRN: [id number]  : 1945 78 y.o.  Referring Physician: Liv Haywood, Debra  Date of Visit: 2023      Certification Dates: 23 through 23    Diagnosis:   1. Cerebrovascular accident (CVA), unspecified mechanism (CMS/HCC)    2. Acute ischemic left MCA stroke (CMS/HCC)        Chief Complaints:  apraxia    Precautions:  Precautions Comments: aphasia, apraxia, pacemaker       TODAY'S VISIT:    Session Time:  Start Time: 1300  Stop Time: 1400  Time Calculation (min): 60 min   History/Vitals/Pain/Encounter Info - 23 1306        Injury History/Precautions/Daily Required Info    Primary Therapist Magnolia Lundy MS,CCC/SLP     Chief Complaint/Reason for Visit  apraxia     Onset of Illness/Injury or Date of Surgery 23     Referring Physician Dr. Haywood     Precautions Comments aphasia, apraxia, pacemaker     Limitations/Impairments safety/cognitive     Document Type progress note     Patient reported fall since last visit No        Pain Assessment    Currently in pain No/Denies                Daily Treatment Assessment and Plan - 23 1306        Daily Treatment Assessment and Plan    Progress toward goals Progressing     Daily Outcome Summary Accuracy and speed in IDing word and noun simple-sentences in F4, (I).  Letters are very difficult.  Difficulty understanding rhyme for auditory discrimination practice.  Improving automatics with Visual cues and unison.     Plan and Recommendations Advanced language ted- present progressive, 2 nouns reversible. Automatics, spell wordon CT for letter Id.                  OBJECTIVE MEASUREMENTS TAKEN TODAY:    None Taken    Today's Treatment:    MOTOR/PRODUCTION SLP FLOW SHEET    SKILL AREA CURRENT SESSION   SPEECH PRODUCTION (MOTOR)  CPT 53833    Pt will produce bilabials /p, b, m, w/, tip alveolars /t, d, n/ and tense vowels /a,  "e, I, o,u/ in isolation provided mod A, 80% of trials.     11/2/2023- MET 11/2/2023  Pt stimulable for /p,b,m,w,d,n/ and tense vowels in initial position and syllables. Pt continues to exhibit difficulty with /t/. Mod A.     10/26/2023:  Attempted in isolation but more difficult than below. Pt able to press lips together with verbal cue and model and was able to produce a /p/ in insolation x 1.    PN: 10/17/2023  Partially Met   Pt will produce VC and CV syllables provided the above phonemes, Mod A, 80% of trials 11/7/2023  Pt continues to have difficulty with /t,p, f, v, y/   11/2/2023  See above    10/26/2023:  /b/ IP CV and CVC: on 1st attempt after SLP model:   3/5 (60%) on 1st attempt, 5/5 with moderate prompting, visual/verbal cues  /p/ in IP CVC/CVCV: on 1st attempt after SLP model:   1/5 (20%), able to produce elsewhere x 1 with max cues (/p/ --> /b/). Coarticulation with vowel /o/ most successful with /p/.   /m/ in IP of CVC/CVCV: on 1st attempt after SLP model:  3/5 (60%), able to produce 5/5 with mod cues (\"press lips together\").    PN: 10/17/2023  Partially Met   Pt will produce single and bisyllablic words consisting of CVCV, VCV and CVC, Mod A, 80% of presentations. 11/2/2023  Workbook for word retrieval.  p 10 Provided pt with mod A for initial position visual cues, pt is able to produce 1-2 syllable words.     10/26/2023:  Given picture with various \"things wrong\" (BCAT KPT- of note, pt pointed to all wrong things 4/4 (I))- pt able to spontaneously say: \"ilk\" for milk, \"break\" for broken plate, \"hot\" for hot pan, \"apple\" and \"fall\" for items in fridge.     Given action pictures (LARK): pt able to produce at least a single recognizable word spontaneously for approximately 50% of pictures. Words often marked by initial consonant deletion. Pt with emerging /s/ in initial position- \"salt\", \"sea\", \"sock\".    PN: 10/17/2023  Partially Met   Pt will produce simple automatics provided mod " "A:  1-10  A-G  Ar and identifiable syllable combinations for MALLY 11/7/2023  1-13 (s)  Mon-Sat (unison)  January- December (mod A) Unison and VC    11/2/2023  1-10 (Min A)   Mon-Sat (unison)  January- December (min A)    10/24/2023:  Automatics: in unison with SLP: 3/10 first attempt, increased rate. Provided visual cue and pacing with unison, up to 8/10 upon 1 attempt.  Ar: Pt able to imitate 2 beat intonation ar, max (A) to apply paired hand gesture. Paired phrase \"I'm fine\" able to match ar and produce close approximation - \"I'm ---ine\".   Pt able to \"hum\" Happy Birthday ar in full as \"Na-na-na-na\"    PN: 10/17/2023  Continue  10/5/2023  1-10 using apraxia ted.     9/21/2023  1-11, missed 5&6  1-10, missed 5&8  A-D,   A-E    9/6/2023  Approximated for 70% of 1-10   Pt will produce small set of functional words/phrases in unison or in imitation with SLP, then produce 3  successive attempts with 80% accuracy  11/2/2023  Provided visual cues for first phoneme and direct model and/or reading cue, pt is improving with familiar words and phrases.    10/24/2023:  Repeat functional word list:  13/25 able to produce close, recognizable approximations in context. Often missing or approximating initial syllable.    High transparency phrase completion: 4/10     PN: 10/17/2023  Continue    10/10/2023  Pt provided a key ring of 9 functional phrases to practice independently.      9/22/23: Produced 9/14 functional words producing all phonemes. Frustrated throughout.    9/6/2023  Produced 8/14 functional words producing all phonemes.     8/25/2023  Established list of functional words, names. See below.  Pt stimulable for 5/16.  Approximations for remainder.   Noted with difficulty with velars, blends in particular.     The pt will demonstrate understanding of 1 noun in simple and present and progressive sentences F4, 80% accy.   (Advanced language L1-2 Identify)   Noun, simple present- met  Noun, present " "progressive- practice  2 nouns, nonreversible)         11/7/2023  100% (I),     11/2/2023  90% (I)   Provided pictures/objects, and/or body parts, pt will demonstrate  understanding of \"point to___\" 85% of trials.     Revise: Pictures, letters identified by phoneme and letter.      11/7/2023  Point to letter: Field of letters- unable- became frustrated  Understand words you hear: 100%  Picture rhymes:50%     10/24/2023:  Aud discrim VF: 4 pictures: Point to the...: 4/4, 4/4, 4/4: 100%  Reading single word matching: VF: 3: 100%, VF: 5: 100%  Match phrase description to pic VF: 5: 100%    PN: 10/17/2023    Pictures and objects, F6: 70%. F4: 80%   NEW:   Pt will answer Y/N questions to general knowledge questions 90% of presentations.  11/7/2023 20/22- 91%  Y/N to picture features: 100%    11/2/2023  11/15    1024/2023: Auditory comprehension: general knowledge yes/no: Initially having difficulty comprehending task. After initial 3 items, pt able to recall 88% accuracy.    PN: 10/17/2023  New   Education/Strategy Training  10/10/2023  Pt was able to match photo to written word in F4 this date.  Stating the words with Max A to produce first phoneme     Other 9/21/2023  Pt was able to participate in conversation about Thelma's horses and pt was compliant with practicing target syllables with target phonemes throughout conversation and added to functional word list.    8/25/2023  POA reports suspect decline in cognition recently.  Encouraged to report to MD and encourage improving sleep and nutrition routines.      Functional words and names: 8.25.2023  Dick Davis  P[place]am  Annia  Water   I'm thirsty  I'm fine  Arm hurts  Legs hurts  Call me  I'm tired  Feed Loan.                                  "

## 2023-11-09 ENCOUNTER — HOSPITAL ENCOUNTER (OUTPATIENT)
Dept: OCCUPATIONAL THERAPY | Age: 78
Setting detail: THERAPIES SERIES
Discharge: HOME | End: 2023-11-09
Attending: PHYSICAL MEDICINE & REHABILITATION
Payer: MEDICARE

## 2023-11-09 ENCOUNTER — HOSPITAL ENCOUNTER (OUTPATIENT)
Dept: SPEECH THERAPY | Age: 78
Setting detail: THERAPIES SERIES
Discharge: HOME | End: 2023-11-09
Attending: PHYSICAL MEDICINE & REHABILITATION
Payer: MEDICARE

## 2023-11-09 DIAGNOSIS — R27.9 LACK OF COORDINATION: ICD-10-CM

## 2023-11-09 DIAGNOSIS — I63.512 ACUTE ISCHEMIC LEFT MCA STROKE (CMS/HCC): Primary | ICD-10-CM

## 2023-11-09 DIAGNOSIS — I63.9 CEREBROVASCULAR ACCIDENT (CVA), UNSPECIFIED MECHANISM (CMS/HCC): Primary | ICD-10-CM

## 2023-11-09 PROCEDURE — 97112 NEUROMUSCULAR REEDUCATION: CPT | Mod: GO,KX,59

## 2023-11-09 PROCEDURE — 92507 TX SP LANG VOICE COMM INDIV: CPT | Mod: GN,KX

## 2023-11-09 PROCEDURE — 97530 THERAPEUTIC ACTIVITIES: CPT | Mod: GO,KX,59

## 2023-11-09 PROCEDURE — 97535 SELF CARE MNGMENT TRAINING: CPT | Mod: GO,KX

## 2023-11-09 NOTE — OP SLP TREATMENT LOG
"MOTOR/PRODUCTION SLP FLOW SHEET    SKILL AREA CURRENT SESSION   SPEECH PRODUCTION (MOTOR)  CPT 67100    Pt will produce bilabials /p, b, m, w/, tip alveolars /t, d, n/ and tense vowels /a, e, I, o,u/ in isolation provided mod A, 80% of trials.     11/2/2023- MET 11/2/2023  Pt stimulable for /p,b,m,w,d,n/ and tense vowels in initial position and syllables. Pt continues to exhibit difficulty with /t/. Mod A.     10/26/2023:  Attempted in isolation but more difficult than below. Pt able to press lips together with verbal cue and model and was able to produce a /p/ in insolation x 1.    PN: 10/17/2023  Partially Met   Pt will produce VC and CV syllables provided the above phonemes, Mod A, 80% of trials 11/7/2023  Pt continues to have difficulty with /t,p, f, v, y/     11/2/2023  See above    10/26/2023:  /b/ IP CV and CVC: on 1st attempt after SLP model:   3/5 (60%) on 1st attempt, 5/5 with moderate prompting, visual/verbal cues  /p/ in IP CVC/CVCV: on 1st attempt after SLP model:   1/5 (20%), able to produce elsewhere x 1 with max cues (/p/ --> /b/). Coarticulation with vowel /o/ most successful with /p/.   /m/ in IP of CVC/CVCV: on 1st attempt after SLP model:  3/5 (60%), able to produce 5/5 with mod cues (\"press lips together\").    PN: 10/17/2023  Partially Met   Pt will produce single and bisyllablic words consisting of CVCV, VCV and CVC, Mod A, 80% of presentations. 11/9/2023  Naming Therapy- Naming practice on [employer] ted.  7/8 with hierarchy cues    11/2/2023  Workbook for word retrieval.  p 10 Provided pt with mod A for initial position visual cues, pt is able to produce 1-2 syllable words.     10/26/2023:  Given picture with various \"things wrong\" (BCAT KPT- of note, pt pointed to all wrong things 4/4 (I))- pt able to spontaneously say: \"ilk\" for milk, \"break\" for broken plate, \"hot\" for hot pan, \"apple\" and \"fall\" for items in fridge.     Given action pictures (MECHELLE): pt able to produce at least a " "single recognizable word spontaneously for approximately 50% of pictures. Words often marked by initial consonant deletion. Pt with emerging /s/ in initial position- \"salt\", \"sea\", \"sock\".    PN: 10/17/2023  Partially Met   Pt will produce simple automatics provided mod A:  1-10  A-G  Ar and identifiable syllable combinations for MALLY 11/9/2023  Practiced [facility] 1 p 11 Therapist says number/letter, pt matches and says it.  Sent home for practice.   Mon-Sun- Min A    11/7/2023  1-13 (s)  Mon-Sat (unison)  January- December (mod A) Unison and VC    11/2/2023  1-10 (Min A)   Mon-Sat (unison)  January- December (min A)    10/24/2023:  Automatics: in unison with SLP: 3/10 first attempt, increased rate. Provided visual cue and pacing with unison, up to 8/10 upon 1 attempt.  Ar: Pt able to imitate 2 beat intonation ar, max (A) to apply paired hand gesture. Paired phrase \"I'm fine\" able to match ar and produce close approximation - \"I'm ---ine\".   Pt able to \"hum\" Happy Birthday ar in full as \"Na-na-na-na\"    PN: 10/17/2023  Continue  10/5/2023  1-10 using apraxia ted.     9/21/2023  1-11, missed 5&6  1-10, missed 5&8  A-D,   A-E    9/6/2023  Approximated for 70% of 1-10   Pt will produce small set of functional words/phrases in unison or in imitation with SLP, then produce 3  successive attempts with 80% accuracy  11/2/2023  Provided visual cues for first phoneme and direct model and/or reading cue, pt is improving with familiar words and phrases.    10/24/2023:  Repeat functional word list:  13/25 able to produce close, recognizable approximations in context. Often missing or approximating initial syllable.    High transparency phrase completion: 4/10     PN: 10/17/2023  Continue    10/10/2023  Pt provided a key ring of 9 functional phrases to practice independently.      9/22/23: Produced 9/14 functional words producing all phonemes. Frustrated throughout.    9/6/2023  Produced 8/14 functional words " "producing all phonemes.     8/25/2023  Established list of functional words, names. See below.  Pt stimulable for 5/16.  Approximations for remainder.   Noted with difficulty with velars, blends in particular.     The pt will demonstrate understanding of 1 noun in simple and present and progressive sentences F4, 80% accy.   (Advanced language L1-2 Identify)   Noun, simple present- met  Noun, present progressive- practice  2 nouns, nonreversible)         11/9/2023  Present progressive: 100%  2 nouns [id number]%  NEXT 2 nouns reversible.     11/7/2023  100% (I),     11/2/2023  90% (I)   Provided pictures/objects, and/or body parts, pt will demonstrate  understanding of \"point to___\" 85% of trials.     Revise: Pictures, letters identified by phoneme and letter.      11/9/2023  Letters/numbers [facility] 1 p 11- matched/100%     11/7/2023  Point to letter: Field of letters- unable- became frustrated  Understand words you hear: 100%  Picture rhymes: 50%     10/24/2023:  Aud discrim VF: 4 pictures: Point to the...: 4/4, 4/4, 4/4: 100%  Reading single word matching: VF: 3: 100%, VF: 5: 100%  Match phrase description to pic VF: 5: 100%    PN: 10/17/2023    Pictures and objects, F6: 70%. F4: 80%   NEW:   Pt will answer Y/N questions to general knowledge questions 90% of presentations.  11/9/2023  15/15, 12/15    11/7/2023    20/22- 91%  Y/N to picture features: 100%    11/2/2023  11/15    1024/2023: Auditory comprehension: general knowledge yes/no: Initially having difficulty comprehending task. After initial 3 items, pt able to recall 88% accuracy.    PN: 10/17/2023  New   Education/Strategy Training  10/10/2023  Pt was able to match photo to written word in F4 this date.  Stating the words with Max A to produce first phoneme     Other 11/9/2023 9/21/2023  Pt was able to participate in conversation about Thelma's horses and pt was compliant with practicing target syllables with target phonemes throughout conversation and " added to functional word list.    8/25/2023  POA reports suspect decline in cognition recently.  Encouraged to report to MD and encourage improving sleep and nutrition routines.      Functional words and names: 8.25.2023  Dick Montes  Annia  Water   I'm thirsty  I'm fine  Arm hurts  Legs hurts  Call me  I'm tired  Feed Loan.

## 2023-11-09 NOTE — PROGRESS NOTES
Occupational Therapy Visit    OT DAILY NOTE FOR OUTPATIENT THERAPY    Patient: Jennifer Sams   MRN: [id number]  : 1945 78 y.o.  Referring Physician: Liv Haywood, Debra  Date of Visit: 2023      Certification Dates: 23 through 23    Diagnosis:   1. Acute ischemic left MCA stroke (CMS/HCC)    2. Lack of coordination        Chief Complaints:        Precautions:       TODAY'S VISIT    Time In Session:  Start Time: 1405  Stop Time: 1500  Time Calculation (min): 55 min   History/Vitals/Pain/Encounter Info - 23 1410        Injury History/Precautions/Daily Required Info    Document Type daily treatment     Patient/Family/Caregiver Comments/Observations Patient reports difficultu managing clothes hangers with clasps     Patient reported fall since last visit No        Pain Assessment    Currently in pain No/Denies        Pre Activity Vital Signs    /80     BP Location Left upper arm     BP Method Manual     Patient Position Sitting                Daily Treatment Assessment and Plan - 23 1544        Daily Treatment Assessment and Plan    Progress toward goals Progressing     Daily Outcome Summary Patient was seen in OP OT and therapist facilitated activity for removing clothes from hangar with clasps. Patient with difficulty with bilateral coordination and unable to use RUE for handling towel, while unfastening clasps with LUE; patient instructed to bring clothes in for future therapy session. Patient appears to benefit from repetition with removing pegs from board with verbal cueing to go slowly and extend fingers with pregrasp position. Patient improved with using mouse while vision focused on game, though frustration appears to increase during task.     Plan and Recommendations Re-eval next session                     OBJECTIVE DATA TAKEN TODAY    None Taken    Today's Treatment:      OT NEURO FLOW SHEET    EXERCISES  Initial Evaluation  Progress Note 1  Progress Note 2  CURRENT SESSION  8/16/2023  9/14/2023  10/23/23 TIME   NEURO RE-ED  CPT 72308 TOTAL TIME FOR SESSION 23-37 Minutes   AAROM/AROM     Strengthening      Strength       Gross Motor Control CPT visual-motor integration activity with use of right hand to control item in game using computer mouse    Fine Motor Control Tripod grasp and hold with right hand with removing pegs from pegboard, patient required mod verbal cueing for pregrasp pattern    Sitting Jennifer/Balance     Standing Jennifer/Balance     Sensory re-education      Retraining     THERE ACT  CPT 73380 TOTAL TIME FOR SESSION 8-22 Minutes   Pain, Vitals, Meds, Etc. Vitals taken, pain assessed/monitored, Rest breaks provided for frustration management    Assessment     HEP 11/2/23: Review pinch strengthening positions today for improved carry over    10/26/23: Added soft theraputty for right hand pinch (lateral pinch, tip pinch, 3 jaw patti); patient expressed good understnading    Discussed carry over with use of SF splint    9/28/23: Laterality exercise added to HEP; patient and caregiver expressed good understanding    Right hand stretching    9/21/2023:   -Educated on use of rice/bean sensory bin, massage, sensory re-education with deep pressure and textures. Issued tubigrip for RUE sensory re-education and advised to wear 1x per day. Continued to discuss edema management.     Functional Mobility     Transfers     THERE EX  CPT 32180 TOTAL TIME FOR SESSION 0-7 Minutes   PROM     Activity Tolerance     SELF-CARE  CPT 82922 TOTAL TIME FOR SESSION 8-22 Minutes   Pt Education/Safety     ADL Training     IADL Training Patient participated in simulated functional task of removing clothes from  today; she demonstrates difficulty with bilateral coordination and use of RUE without sustained focus    MODALITIES  CPT 71558 TOTAL TIME FOR SESSION Not performed   Ice/Heat     ATTENDED E-STIM  CPT 11325 TOTAL TIME FOR SESSION Not performed   Attended E-Stim      SPLINTING  CPT 61995 TOTAL TIME FOR SESSION Not performed   Fabrication/Check Out     Fit     Training     GROUP  CPT 91206 TOTAL TIME FOR SESSION Not performed             Normative Range for Female 75+ Years of Age       Right Hand   Left Hand      Initial Evaluation Progress Note 1 Norm Initial Evaluation Progress Note 1 Norm    Strength 0 lbs 0 lbs 42.6 lbs 30 lbs 30 lbs 37.6 lbs   Lateral Pinch N/T 2 lbs 12.6 lbs N/T 14 lbs 11.4 lbs   Three Jaw Yonatan N/T 2 lbs (assist for position) 12 lbs N/T 11 lbs 11.5 lbs   Tip Pinch N/T N/T 9.6 lbs N/T 9 lbs 9.3 lbs   Box and Block 4 blocks 22 blocks 65 blocks 50 blocks 56 blocks 63.6 blocks   Nine Hole Peg (71+ age) Unable to complete Unable to complete 22.49 seconds N/T 24.55 seconds 24.11 seconds

## 2023-11-09 NOTE — PROGRESS NOTES
Speech-Language Pathology Visit      SLP DAILY NOTE FOR OUTPATIENT THERAPY    Patient: Jennifer Sams   MRN: [id number]  : 1945 78 y.o.  Referring Physician: Liv Haywood, Debra  Date of Visit: 2023      Certification Dates: 23 through 23    Diagnosis:   1. Cerebrovascular accident (CVA), unspecified mechanism (CMS/HCC)        Chief Complaints:  apraxia    Precautions:  Precautions Comments: aphasia, apraxia, pacemaker       TODAY'S VISIT:    Session Time:  Start Time: 1307  Stop Time: 1400  Time Calculation (min): 53 min   History/Vitals/Pain/Encounter Info - 23 1309        Injury History/Precautions/Daily Required Info    Primary Therapist Magnolia Lundy MS,CCC/SLP     Chief Complaint/Reason for Visit  apraxia     Onset of Illness/Injury or Date of Surgery 23     Referring Physician Dr. Haywood     Precautions Comments aphasia, apraxia, pacemaker     Limitations/Impairments safety/cognitive     Document Type daily treatment     Patient reported fall since last visit No        Pain Assessment    Currently in pain No/Denies                Daily Treatment Assessment and Plan - 23 1309        Daily Treatment Assessment and Plan    Daily Outcome Summary Continues to progress with auditory comprehension of sentence identification and [facility]     Plan and Recommendations Re-Evaluation.  BDAE subtests from prior and [facility]                  OBJECTIVE MEASUREMENTS TAKEN TODAY:    None Taken    Today's Treatment:    MOTOR/PRODUCTION SLP FLOW SHEET    SKILL AREA CURRENT SESSION   SPEECH PRODUCTION (MOTOR)  CPT 64873    Pt will produce bilabials /p, b, m, w/, tip alveolars /t, d, n/ and tense vowels /a, e, I, o,u/ in isolation provided mod A, 80% of trials.     2023- MET 2023  Pt stimulable for /p,b,m,w,d,n/ and tense vowels in initial position and syllables. Pt continues to exhibit difficulty with /t/. Mod A.  "    10/26/2023:  Attempted in isolation but more difficult than below. Pt able to press lips together with verbal cue and model and was able to produce a /p/ in insolation x 1.    PN: 10/17/2023  Partially Met   Pt will produce VC and CV syllables provided the above phonemes, Mod A, 80% of trials 11/7/2023  Pt continues to have difficulty with /t,p, f, v, y/     11/2/2023  See above    10/26/2023:  /b/ IP CV and CVC: on 1st attempt after SLP model:   3/5 (60%) on 1st attempt, 5/5 with moderate prompting, visual/verbal cues  /p/ in IP CVC/CVCV: on 1st attempt after SLP model:   1/5 (20%), able to produce elsewhere x 1 with max cues (/p/ --> /b/). Coarticulation with vowel /o/ most successful with /p/.   /m/ in IP of CVC/CVCV: on 1st attempt after SLP model:  3/5 (60%), able to produce 5/5 with mod cues (\"press lips together\").    PN: 10/17/2023  Partially Met   Pt will produce single and bisyllablic words consisting of CVCV, VCV and CVC, Mod A, 80% of presentations. 11/9/2023  Naming Therapy- Naming practice on [employer] ted.  7/8 with hierarchy cues    11/2/2023  Workbook for word retrieval.  p 10 Provided pt with mod A for initial position visual cues, pt is able to produce 1-2 syllable words.     10/26/2023:  Given picture with various \"things wrong\" (BCAT KPT- of note, pt pointed to all wrong things 4/4 (I))- pt able to spontaneously say: \"ilk\" for milk, \"break\" for broken plate, \"hot\" for hot pan, \"apple\" and \"fall\" for items in fridge.     Given action pictures (LARK): pt able to produce at least a single recognizable word spontaneously for approximately 50% of pictures. Words often marked by initial consonant deletion. Pt with emerging /s/ in initial position- \"salt\", \"sea\", \"sock\".    PN: 10/17/2023  Partially Met   Pt will produce simple automatics provided mod A:  1-10  A-G  Yolis and identifiable syllable combinations for MALLY 11/9/2023  Practiced [facility] 1 p 11 Therapist says number/letter, pt matches " "and says it.  Sent home for practice.   Mon-Sun- Min A    11/7/2023  1-13 (s)  Mon-Sat (unison)  January- December (mod A) Unison and VC    11/2/2023  1-10 (Min A)   Mon-Sat (unison)  January- December (min A)    10/24/2023:  Automatics: in unison with SLP: 3/10 first attempt, increased rate. Provided visual cue and pacing with unison, up to 8/10 upon 1 attempt.  Ar: Pt able to imitate 2 beat intonation ar, max (A) to apply paired hand gesture. Paired phrase \"I'm fine\" able to match ar and produce close approximation - \"I'm ---ine\".   Pt able to \"hum\" Happy Birthday ar in full as \"Na-na-na-na\"    PN: 10/17/2023  Continue  10/5/2023  1-10 using apraxia ted.     9/21/2023  1-11, missed 5&6  1-10, missed 5&8  A-D,   A-E    9/6/2023  Approximated for 70% of 1-10   Pt will produce small set of functional words/phrases in unison or in imitation with SLP, then produce 3  successive attempts with 80% accuracy  11/2/2023  Provided visual cues for first phoneme and direct model and/or reading cue, pt is improving with familiar words and phrases.    10/24/2023:  Repeat functional word list:  13/25 able to produce close, recognizable approximations in context. Often missing or approximating initial syllable.    High transparency phrase completion: 4/10     PN: 10/17/2023  Continue    10/10/2023  Pt provided a key ring of 9 functional phrases to practice independently.      9/22/23: Produced 9/14 functional words producing all phonemes. Frustrated throughout.    9/6/2023  Produced 8/14 functional words producing all phonemes.     8/25/2023  Established list of functional words, names. See below.  Pt stimulable for 5/16.  Approximations for remainder.   Noted with difficulty with velars, blends in particular.     The pt will demonstrate understanding of 1 noun in simple and present and progressive sentences F4, 80% accy.   (Advanced language L1-2 Identify)   Noun, simple present- met  Noun, present progressive- " "practice  2 nouns, nonreversible)         11/9/2023  Present progressive: 100%  2 nouns [id number]%  NEXT 2 nouns reversible.     11/7/2023  100% (I),     11/2/2023  90% (I)   Provided pictures/objects, and/or body parts, pt will demonstrate  understanding of \"point to___\" 85% of trials.     Revise: Pictures, letters identified by phoneme and letter.      11/9/2023  Letters/numbers [facility] 1 p 11- matched/100%     11/7/2023  Point to letter: Field of letters- unable- became frustrated  Understand words you hear: 100%  Picture rhymes: 50%     10/24/2023:  Aud discrim VF: 4 pictures: Point to the...: 4/4, 4/4, 4/4: 100%  Reading single word matching: VF: 3: 100%, VF: 5: 100%  Match phrase description to pic VF: 5: 100%    PN: 10/17/2023    Pictures and objects, F6: 70%. F4: 80%   NEW:   Pt will answer Y/N questions to general knowledge questions 90% of presentations.  11/9/2023  15/15, 12/15    11/7/2023    20/22- 91%  Y/N to picture features: 100%    11/2/2023  11/15    1024/2023: Auditory comprehension: general knowledge yes/no: Initially having difficulty comprehending task. After initial 3 items, pt able to recall 88% accuracy.    PN: 10/17/2023  New   Education/Strategy Training  10/10/2023  Pt was able to match photo to written word in F4 this date.  Stating the words with Max A to produce first phoneme     Other 11/9/2023 9/21/2023  Pt was able to participate in conversation about Thelma's horses and pt was compliant with practicing target syllables with target phonemes throughout conversation and added to functional word list.    8/25/2023  POA reports suspect decline in cognition recently.  Encouraged to report to MD and encourage improving sleep and nutrition routines.      Functional words and names: 8.25.2023  Dick Davis  Pastsam  Annia  Water   I'm thirsty  I'm fine  Arm hurts  Legs hurts  Call me  I'm tired  Feed " Loan.

## 2023-11-09 NOTE — OP OT TREATMENT LOG
OT NEURO FLOW SHEET    EXERCISES  Initial Evaluation  Progress Note 1  Progress Note 2 CURRENT SESSION  8/16/2023  9/14/2023  10/23/23 TIME   NEURO RE-ED  CPT 49873 TOTAL TIME FOR SESSION 23-37 Minutes   AAROM/AROM     Strengthening      Strength       Gross Motor Control CPT visual-motor integration activity with use of right hand to control item in game using computer mouse    Fine Motor Control Tripod grasp and hold with right hand with removing pegs from pegboard, patient required mod verbal cueing for pregrasp pattern    Sitting Jennifer/Balance     Standing Jennifer/Balance     Sensory re-education      Retraining     THERE ACT  CPT 59222 TOTAL TIME FOR SESSION 8-22 Minutes   Pain, Vitals, Meds, Etc. Vitals taken, pain assessed/monitored, Rest breaks provided for frustration management    Assessment     HEP 11/2/23: Review pinch strengthening positions today for improved carry over    10/26/23: Added soft theraputty for right hand pinch (lateral pinch, tip pinch, 3 jaw patti); patient expressed good understnading    Discussed carry over with use of SF splint    9/28/23: Laterality exercise added to HEP; patient and caregiver expressed good understanding    Right hand stretching    9/21/2023:   -Educated on use of rice/bean sensory bin, massage, sensory re-education with deep pressure and textures. Issued tubigrip for RUE sensory re-education and advised to wear 1x per day. Continued to discuss edema management.     Functional Mobility     Transfers     THERE EX  CPT 17105 TOTAL TIME FOR SESSION 0-7 Minutes   PROM     Activity Tolerance     SELF-CARE  CPT 48420 TOTAL TIME FOR SESSION 8-22 Minutes   Pt Education/Safety     ADL Training     IADL Training Patient participated in simulated functional task of removing clothes from  today; she demonstrates difficulty with bilateral coordination and use of RUE without sustained focus    MODALITIES  CPT 81593 TOTAL TIME FOR SESSION Not performed   Ice/Heat      ATTENDED E-STIM  CPT 77771 TOTAL TIME FOR SESSION Not performed   Attended E-Stim     SPLINTING  CPT 03734 TOTAL TIME FOR SESSION Not performed   Fabrication/Check Out     Fit     Training     GROUP  CPT 17894 TOTAL TIME FOR SESSION Not performed             Normative Range for Female 75+ Years of Age       Right Hand   Left Hand      Initial Evaluation Progress Note 1 Norm Initial Evaluation Progress Note 1 Norm    Strength 0 lbs 0 lbs 42.6 lbs 30 lbs 30 lbs 37.6 lbs   Lateral Pinch N/T 2 lbs 12.6 lbs N/T 14 lbs 11.4 lbs   Three Jaw Yonatan N/T 2 lbs (assist for position) 12 lbs N/T 11 lbs 11.5 lbs   Tip Pinch N/T N/T 9.6 lbs N/T 9 lbs 9.3 lbs   Box and Block 4 blocks 22 blocks 65 blocks 50 blocks 56 blocks 63.6 blocks   Nine Hole Peg (71+ age) Unable to complete Unable to complete 22.49 seconds N/T 24.55 seconds 24.11 seconds

## 2023-11-14 ENCOUNTER — HOSPITAL ENCOUNTER (OUTPATIENT)
Dept: SPEECH THERAPY | Age: 78
Setting detail: THERAPIES SERIES
Discharge: HOME | End: 2023-11-14
Attending: PHYSICAL MEDICINE & REHABILITATION
Payer: MEDICARE

## 2023-11-14 ENCOUNTER — HOSPITAL ENCOUNTER (OUTPATIENT)
Dept: OCCUPATIONAL THERAPY | Age: 78
Setting detail: THERAPIES SERIES
Discharge: HOME | End: 2023-11-14
Attending: PHYSICAL MEDICINE & REHABILITATION
Payer: MEDICARE

## 2023-11-14 DIAGNOSIS — R27.9 LACK OF COORDINATION: Primary | ICD-10-CM

## 2023-11-14 DIAGNOSIS — I63.512 ACUTE ISCHEMIC LEFT MCA STROKE (CMS/HCC): ICD-10-CM

## 2023-11-14 DIAGNOSIS — I63.9 CEREBROVASCULAR ACCIDENT (CVA), UNSPECIFIED MECHANISM (CMS/HCC): Primary | ICD-10-CM

## 2023-11-14 PROCEDURE — 92507 TX SP LANG VOICE COMM INDIV: CPT | Mod: GN,KX

## 2023-11-14 PROCEDURE — 97535 SELF CARE MNGMENT TRAINING: CPT | Mod: GO,KX

## 2023-11-14 PROCEDURE — 97530 THERAPEUTIC ACTIVITIES: CPT | Mod: GO,KX,59

## 2023-11-14 ASSESSMENT — 9 HOLE PEG TEST
TESTTIME_SECONDS: 24.06
TEST_RESULT: 16.65

## 2023-11-14 NOTE — LETTER
"Dear DR. Haywood,    Thank you for this referral. Please review the attached notes and plan of care for your approval.  Please contact our department with any questions.     Sincerely,     Magnolia Lundy, CCC-SLP  [address]  [address]  [facility] [place] [facility] [address]    By co-signing this Plan of Care (POC) you agree to the following:  I have reviewed the the Plan of Care established by the therapist within this document and certify that the services are skilled and medically necessary. I have reviewed the plan and recommend that these services continue to meet the goals stated in this document.    PHYSICIAN SIGNATURE: __________________________________     DATE: ___________________  TIME: _____________           Speech Therapy Plan of Care 23   Effective from: 2023  Effective to: 2024    Plan ID: [id number]            Participants as of Finalize on 2023    Name Type Comments Contact Info    Liv Haywood MD Referring Provider  [phone]    Magnolia Lundy CCC-SLP Speech Language Pathologist      Chris Wang MD PCP - General  [phone]       Last Progress Notes Note     Author: Magnolia Lundy CCC-SLP Status: Signed Last edited: 2023  1:00 PM         Speech-Language Pathology Progress Note    [facility] Fax: [phone]    SLP RE-EVALUATION FOR OUTPATIENT THERAPY    Patient: Jennifer Sams   MRN: [id number]  : 1945 78 y.o.    Referring Physician: Liv Haywood, *  Date of Visit: 2023    New Certification Dates: 23 through 24    Recommended Frequency & Duration:  2 times/week for up to 3 months   PN: 2023    Diagnosis:   1. Cerebrovascular accident (CVA), unspecified mechanism (CMS/HCC)        Chief Complaints:   Chief Complaint   Patient presents with    Speech Problem     Language, speech. \"I can't say it\"       Precautions:   Precautions Comments: aphasia, apraxia, pacemaker    TODAY'S " VISIT:    Session Time:  Start Time: 1300  Stop Time: 1400  Time Calculation (min): 60 min   General Information - 11/14/23 1300        Session Details    Document Type re-evaluation     Mode of Treatment individual therapy        General Information    Onset of Illness/Injury or Date of Surgery 06/26/23     Referring Physician Dr. Haywood     History of present illness/functional impairment Jennifer is a 78 y.o. female who presented to [facility] on June 26 after she has not been seen by her friends for a few days.  Patient lives alone and when she was found by EMS she was confused and combative.  In the emergency department she was aphasic with right-sided weakness and hypertensive to the 190 systolic.  CT scan of the head revealed an acute infarct in left MCA with mild bleeding.  Echo showed an EF of 43% and a bubble study was negative.  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 Pt transferred to  [facility] on 7/1/2023 Principal problem  Acute ischemic left MCA stroke (CMS/HCC). PMH AAA, heart block s/p cardiac pacemaker, glaucoma, hyperlipidemia, coronary artery disease, She was evaluated by speech therapy and cleared for a soft and bite-size diet with moderately thick liquids.  7/2/2023:  Diet SB6 with mildly thick liquids (MT2)  7/6/2023: Upgraded to EC7 with thin liquids.  Pt discharged home with 24 hr care and home health services recieving speech therapy for motor speech and aphasia.     Precautions Comments aphasia, apraxia, pacemaker     Patient/Family/Caregiver Comments/Observations Pt attended independently this date.  Able to communicate that Thelma BECERRIL was out running errands while pt attended therapies.  Additionally communicated concerns in regard to poor sleep, conveyed feels tired but mind does not stop.  Pt expressed disinterest in mediations to aid in sleep.                Daily Falls Screen - 11/14/23 1016        Daily Falls Assessment    Patient reported  fall since last visit No                Pain and Vitals - 11/14/23 1015        Pain Assessment    Currently in pain No/Denies                SLP - 11/14/23 1300        Speech Therapy    Speech Therapy Specialty Traditional Neuro Program ST        SLP Frequency and Duration    Frequency of treatment 2 times/week     Speech Duration 3 months     SLP Custom Frequency and Duration PN: 12/14/2023     SLP Cert From 11/14/23     SLP Cert To 02/12/24     Date SLP POC was sent to provider 11/14/23     Signed SLP Plan of Care received?  No                Assessment - 11/14/23 1130        Assessment    Plan of Care reviewed and patient/family in agreement Yes     Comments Plan of care was recommended to pt to continue 2 times a week for 3 months.  Pt was in agreement.     Rehab Potential/Prognosis (SLP) adequate, monitor progress closely     Problem List Impaired communication     Clinical Assessment  has demonstrated progression in motor speech, expressive and receptive language.  Ms. Sams continues to present with a moderate receptive and expressive aphasia complicated by severe motor speech deficits.  It is recommended that Ms. Sams continue to attend speech and language therapy for further functional communication within home and community.     Plan and Recommendations Initiate revised goals.     [facility] [address] Speech Lang Voice Comm and/or Auditory Proc (Treatment of speech, language, voice, communication and/or hearing processing disorder)                 OBJECTIVE MEASUREMENTS/DATA:    OM: FCM/Voice/Neuro    Outcome Measures - 11/14/23 1015        Functional Communication Measures    FCM: Motor Speech 3-->Level 3   approaching 4    FCM: Spoken Language, Comprehension 4-->Level 4     FCM: Spoken Language, Expression 3-->Level 3   approaching 4                Outcome Measures        8/16/2023    17:42 8/25/2023    16:57 11/14/2023    10:15   SLP Outcome Measures   FCM: Motor Speech 2-->Level 2        Goal L4  3-->Level 3       approaching 4   FCM: Reading 3-->Level 3       Goal: maintain 3, indirect therapy, improve to 4.     FCM: Spoken Language, Comprehension --        TBD, suspect L2, goal, indirect therapy-4 2-->Level 2 4-->Level 4   FCM: Spoken Language, Expression 2-->Level 2  3-->Level 3       approaching 4       Today's Treatment:    BDAE  Eval: 8/18/2023     RE: 11/14/2023     Aphasia Severity Rating Scale 1/5  2/5   CONVERSATIONAL and EXPOSITORY SPEECH       Simple Social Responses Raw Score:   Percentiles:  Raw Score: 6/7  Percentiles: 50%tile   Complexity Index  Raw Score:    Ratio:   Percentiles:      Narrative Discourse  Short form Raw Score:   Ratio:   Percentiles:      AUDITORY COMPREHEN   Basic Word Discrimination     Raw Score: 29/37  Percentiles:20%     Raw Score: 33/37  Percentiles:40-50%   Commands  Standard Form  8/25/2023 Raw Score: 3/15  Percentiles: 0-10% Raw Score: 7/15  Percentiles: 10-20%     Complex Ideational Material  Short Form  8/25/2023    Raw Score: 0/4  Percentiles: 0%     Raw Score: 5/6  Percentiles: 70%      ARTICULATION  Agility                ORAL EXPRESSION  Recitation, Yolis, Rhythm                 Automatized Sequences  Standard Form    Recitation: 0  Percentiles: 30%tile  Yolis: 1  Percentiles: 30%tile  Rhythm: 1  Percentiles: 60%tile     Raw Score: 0/8  Percentiles: 0%tile     Recitation: 0  Percentiles: 30%tile  Yolis: 2  Percentiles: 100%tile  Rhythm: 1  Percentiles: 60%tile    Short Form  Raw Score: 2/4  Percentiles: 20%tile   REPETITION  Words  Standard Form     Repetition of Nonsense words     Sentences  Standard Form    Raw Score: 0  Percentiles:0 %tile     Raw Score:  Percentiles:      Raw Score:  Percentiles:     Raw Score: 3/10  Percentiles:10 %tile                     NAMING  Responsive Naming  Standard Form     [place] Naming Test  Standard Form    Raw Score:   Percentiles:      Raw Score:   Stim cue:   PC:   MC:   Percentiles:   Mean:   Short Form   Raw  Score: 0/5  Percentiles:10% tile            READING  Match case and scripts     Number matching     Picture-Word Matching     Oral Word Reading     Oral Sentence Reading     Oral Sentence Comp     Sentence/Paragraph     Comprehension    Raw Score: 7  Percentiles: 40%tile  Raw Score:   Percentiles:   Raw Score: 10  Percentiles: 100%tile  Raw Score:   Percentiles:   Raw Score:   Percentiles:   Raw Score:   Percentiles:   Raw Score:   Percentiles:   Raw Score:   Percentiles:     Raw Score: 8  Percentiles: 100%tile  Raw Score: 8/12  Percentiles: 10  Raw Score: 7  Percentiles: 20%tile    WRITING  Form     Letter Choice     Motor Facility     Primer Words     Regular Phonics     Common Irregular Words     Written Picture Naming     Narrative Writing    Raw Score:   Percentiles:   Raw Score:   Percentiles:   Raw Score:   Percentiles:   Raw Score:   Percentiles:   Raw Score:   Percentiles:   Raw Score:   Percentiles:   Raw Score:   Percentiles:   Raw Score:   Percentiles:           IE: 2023 RE: 2023   Rating Scale Profile of Speech Characteristics 1-7 scale 1-7 scale   Articulatory Agility-phoneme and syllable level  2-37   Phrase Length: longest word runs 7 37   Grammatical Form: Variety and use of morphemes  2/7   Melodic Line (Prosody)  6-7   Paraphasia in Running Speech   2/7   Word Finding Relative to Fluency  3/7   Sentence Repetition  1   Auditory Comprehension  37     Aphasia Severity Ratin/5-   Conversations about familiar subjects is possible with help from the listener.  There are frequent failures to convey the idea, but the patient shares the burden of communication.     Provided a direct model and visual cue  Sound Initial Medial Final    p + park      b -  way/bay     t -  lung/tongue     d + day     k -  bambi/kiss     g -  olf/golf     m -  eat/meat     n - durse/nurse     ng      f - mehreen/fam     v -  ses/vest     th-vl +  think     th-v -  leese/these     s + sew  "    z -  mina/zoo     sh +  shirt     ch +  chair     dg +  Bean     l -  Yuli/rollins     r/ir/er +  rake     w -  dzatch/watch     bl +  blow     br      dr      fr      gl      gr      kr      kw      nt      pl      pr      sl      sp      st      sw      tr                             Goals:     Goals Addressed                 This Visit's Progress     SLP Motor Goals           NEW /Revised RE=Evaluation: 2023   Goals Short-Long Time Frame Result Comment   Will improve FCM in the following:  MOTOR SPEECH  Current:3-4  Goal:4-5  SPOKEN LANG COMP  Current:4  Goal: 5  SPOKEN LANGUAGE EXP:  Current:3  Goal:4 LTG 12 w     Pt will improve Aphasia Severity Rating Scale from 2/5 to >/=3/5 LTG 12 w     MOTOR SPEECH:  Provided Auditory bombardment, picture stimulus, direct visual/aud model, pt will produce target words with /p,b,m,w,t,d,n,s,l/ in initial position on first trial, 80% of presentations.  STG 4-6 w     Fading cues from above goal: Pt will produce CVCV, VCV and CVC syllables 80% of presentations.  STG 6-8 w     SPOKEN LANG EXP  Given simple picture cue pt will produce simple sentences of 3-4 words with simplified grammatical form and approximated content words to convey meaning, Min A, 8% of presentations.  STG 6-8     Pt will produce simple automatics provided mod A:  1-20  A-J  MALLY   STG 6-8     Pt will produce simple social responses, Mod A:  Name     Address (parts) STG      SPOKEN LANG COMP  The pt will demonstrate understanding of 2 noun in simple and present and progressive reversible sentences F4, 80% accy.   (Advanced language L1-2 Identify)  STG      Provided body parts (on paper), single letters, digits in 10's/100's place value, pt will demonstrate  understanding of \"point to/touch/pick up___\" commands 85% of trials.  STG      FCM: MOTOR SPEECH:  L3:  The communication partner must assume primary responsibility for interpreting the communication exchange, however the individual is able to " produce short consonant-vowel combinations or automatic words intelligibly. With consistent MODERATE cueing, the individual can produce simple words and phrases intelligibly, although accuracy may vary.         FCM: Spoken Language Expression   L3: The communication partner must assume responsibility for structuring the communication exchange, and with consistent and moderate cueing, the individual can produce words and phrases that are appropriate and meaningful in context.     FCM: Spoken Language Comprehension  L4: The individual consistently responds accurately to simple yes/no questions and occasionally follows simple directions without cues.  Moderate contextual support is usually needed to understand complex sentences/messages.  The individual is able to understand limited conversations about routine daily activities with familiar communication partners.           Time Frame  Result  Comment/Progress    Will improve FCM in the following:  MOTOR SPEECH  Current:3  Goal:4-5  SPOKEN LANG COMP  Current:2  Goal: 2-3 LTG- 12 w Partially Met RE:: 11/14/2023  MOTOR SPEECH: 3, emerging to 4    SPOKEN LANG COMP:  4     Demonstrate effective speech intelligibility to communicate at single word and functional phrase level with (min A) use of compensatory strategies with familiar listeners  LTG- 12 w MET RE:: 11/14/2023  Aphasia Severity scale, 2/5     Pt will produce bilabials /p, b, m, w/, tip alveolars /t, d, n/ and tense vowels /a, e, I, o,u/ in isolation provided mod A, 80% of trials.  STG-4w   Partially Met  RE:: 11/14/2023  Isolation.  Provided direct model with visual cues pt can produce, but inconsistent due to difficulty understanding need to imitate.   Vowels: MET   Pt will produce VC and CV syllables provided the above phonemes, Mod A, 80% of trials STG- 6-8 w   Partially Met  RE:: 11/14/2023  Benefits from visual picture, direct model and visual cues.    Pt will produce single and bisyllablic words consisting  "of CVCV, VCV and CVC, Mod A, 80% of presentations.  STG 8-10   Partially Met  RE:: 2023  Benefits from visual picture, direct model and visual cues and initial phonemic cue for 88% accuracy.   Pt will produce simple automatics provided mod A:  1-10  A-G  Yolis and identifiable syllable combinations for MALLY  STG- 8-10  Partially Met, continue with goal  RE:: 2023  1-10, S--min A  A-/6 min A  Mon-Sun- Min A    Yolis- MET      Pt will produce small set of functional words/phrases in unison or in imitation with SLP, then produce 3  successive attempts with 80% accuracy  STG 8-10   Not Met, improving  RE:: 2023  75% provided DM, fade cues.  Improving with reading from flash cards. Pt has not been able to perform 3 productions.    Auditory Comprehension and reading to be further assessed for determination of goals to support speech production    GOAL: 2023  Given direct model, repetition and NV reinforcement, Pt will follow 1-step commands for motor speech production up to 1-2 syllable words, 80% of presentations.     The pt will demonstrate understanding of 1 noun in simple and present and progressive sentences F4, 80% accy.   (Advanced language L1-2 Identify)                 4-6 w              MET          MET            New  10/17/23              PN: 10/17/2023          RE: 2023  Present progressive: 100%  2 nouns [id number]%  NEXT 2 nouns reversible.    GOAL: 2023  Provided pictures/objects, and/or body parts, pt will demonstrate  understanding of \"point to___\" and \" ___\" 85% of trials.     8 w        Partially Met          RE:: 2023  Pictures and objects,    F4:100%  Body parts: 80%   NEW:   Pt will answer Y/N questions to general knowledge questions 90% of presentations.  New: 10/17/2023 Met RE:: 2023                 Magnolia Lundy, CCC-SLP         Current Participants as of 2023    Name Type Comments Contact Info    Liv Haywood, " MD Referring Provider  [phone]    Signature pending    Magnolia Lundy CCC-SLP Speech Language Pathologist      Signature pending    Chris Wang MD PCP - General  [phone]    Signature pending

## 2023-11-14 NOTE — Clinical Note
Dear DR. Haywood,    Thank you for this referral. Please review the attached notes and plan of care for your approval.  Please contact our department with any questions.     Sincerely,     Barry Rosas OT  [address]  [address]  [place] [address]  Fax  [phone]      By co-signing this Plan of Care (POC) you agree to the following:  I have reviewed the the Plan of Care established by the therapist within this document and certify that the services are skilled and medically necessary. I have reviewed the plan and recommend that these services continue to meet the goals stated in this document.    PHYSICIAN SIGNATURE: __________________________________     DATE: ___________________  TIME: _____________           Occupational Therapy Plan of Care 23   Effective from: 2023  Effective to: 2024    Plan ID: [id number]            Participants as of Finalize on 2024    Name Type Comments Contact Info    Barry Rosas OT Occupational Therapist      Chris Wang MD Referring Physician  [phone]       Last Progress Notes Note     Author: Barry Rosas OT Status: Signed Last edited: 2024  2:00 PM       Duplicate note for purposes of resending historical OT POC.    Occupational Therapy Progress Note    [facility] Fax: [phone]    OT RE-EVALUATION FOR OUTPATIENT THERAPY    Patient: Jennifer Sams   MRN: [id number]  : 1945 79 y.o.    Referring Physician: Liv Haywood, *  Date of Visit: 2023    New Certification Dates: 23 through 24    Recommended Frequency & Duration:  2 times/week for up to 3 months         Diagnosis:   1. Lack of coordination    2. Acute ischemic left MCA stroke (CMS/HCC)        Chief Complaints:  No chief complaint on file.      Precautions:        TODAY'S VISIT:    Time In Session:  Start Time: 1405  Stop Time: 1500  Time Calculation (min): 55 min   General Information - 23 1411           Session Details    Document Type daily treatment        General Information    Onset of Illness/Injury or Date of Surgery 06/26/23     Referring Physician Dr. Haywood     History of present illness/functional impairment The patient is a 78-year-old  female with a history of AAA, heart block s/p cardiac pacemaker, glaucoma, hyperlipidemia, coronary artery disease, NSTEMI, who presented to [facility] on June 26 after she has not been seen by her friends for a few days.  Patient lives alone and when she was found by EMS she was confused and combative.  In the emergency department she was aphasic with right-sided weakness and hypertensive to the 190 systolic.  CT scan of the head revealed an acute infarct in left MCA with mild bleeding.  Echo showed an EF of 43% and a bubble study was negative.  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  Later this was transitioned to apixaban.  She was continued on statin for secondary prevention.  She required virtual shivani while in the hospital but this was not effective.  She was evaluated by speech therapy and cleared for a soft and bite-size diet with moderately thick liquids.'     Patient/Family/Caregiver Comments/Observations Patient presents to OT session and is agreeable to re-eval         Services    Do You Speak a Language Other Than English at Home? no                      Pain/Vitals - 11/14/23 1419          Pain Assessment    Currently in pain No/Denies                    OT - 11/14/23 1419          Occupational Therapy Encounter Type Details    Occupational Therapy Specialty Traditional Neuro Program OT        OT Frequency and Duration    Frequency of treatment 2 times/week     OT Duration 3 months     OT Cert From 11/14/23     OT Cert To 02/12/24     Date OT POC was sent to provider 11/14/23     Signed OT Plan of Care received?  Yes                     Assessment - 11/14/23 5120          Assessment    Plan of Care reviewed and patient/family in agreement Yes     Comments Patient is in agreeement     System Pathology/Pathophysiology Noted neuromuscular     Functional Limitations in Following Categories self-care;home management;community/leisure     Problem List: Occupational Therapy coordination impaired;impaired cognition;decreased flexibility;sensation decreased     Rehab Potential/Prognosis: Occupational Therapy good, to achieve stated therapy goals     Clinical Assessment Patient seen in OP OT for re-evaluation and arrives unaccompanied today. Patient was seen for initial evaluation on 8/16/23 and presented with significant impairments in functional use of RUE including deficits in sensation, strength, fine and gross motor control. Patient has been seen for 17 OT visits including today. Objective metrics assessed today as follow: Box and Block score at left hand is 53 blocks today (IE: 30 blocks, norms: 63.6 blocks) and at right hand is 28 blocks (IE: 4, norms: 65); 9 hole peg test score at 24.06 sec (norms: 24.11 sec); right hand unable to complete test and modified to only include removing pegs, with improvement to 16.65 seconds todays as compared to 48.6 seconds at first measurement. AROM at right hand has improved per distance from MF to DPC, measured at 1.5 cm today as compared to 6.5 cm at IE. Right hand  strength has improved to 5.3 lbs as compared to 0 lbs at IE, and left hand  strength has improved to 36 lbs as compared to 30 lbs at IE. Subjectively patient reports improvement in independence at home with ADLs and with increase in bilateral coordination for performing routine task including tying her shoes, drying off after showering, and performing dressing. Patient continues to experience neuromuscular deficits at RUE impairing performance with ADL, IADL and leisure occupational performance at this time. Recommend exension of OT POC 2x/week  for 3 months and patient is agreeable to plan.     Plan and Recommendations Extend OT POC for improved RUE ROM, strength, coordination, and functional engagement in ADL/IADL/leisure     Planned Services CPT 81841 Neuromuscular Reeducation;CPT 58725 Self-care/Home management training;CPT 63258 Therapeutic activities;CPT 39175 Hot/Cold Packs therapy                     OBJECTIVE MEASUREMENTS/DATA:    BADL/IADL    BADL/IADL - 11/14/23 1420          BADL Interventions Assessment    Upper Body Dressing Modified independence     Lower Body Dressing Modified independence     Lower body dressing comments Intermittent success with tying shoes; taking 10 minutes to get dressing     Bathing Minimum assist (75% patient effort)     Bathing comments Patient reports attempting to wash her hair but limited by impaired by impaired sensation at right side of head     Toileting Independent     Grooming Minimum assist (75% patient effort)     Grooming comments Usually automatic toothbrush. Able to brush hair but difficulty with blowing hair     Eating Minimum assist (75% patient effort)     Eating comments diet is soft food only; requires assist with cutting into pieces. Incorporating RUE minimally        IADL/Home Interventions Assessment    Home management/maintenance Minimum assist (75% patient effort)     Home management/maintenance comments Difficulty with hand washing dishes, using LUE for dusting, gets assistance with vacuuming due to heart condition.     Meal prep / clean up Maximum assist (25% patient effort)     Meal prep / clean up comments Able to reheat in microwave but not preparing food at this time, hot or cold                   Gross and Fine Motor     Gross and Fine Motor - 11/14/23 1420          Hand  Strength Testing    Left Hand, Setting 2 34/36/38; Average = 36 lbs     Right Hand, Setting 2 6/5/5; Average = 5.3 lbs                   Outcome Measures    Outcome Measures - 11/14/23 1420          Objective  Outcome Measures    RIGHT hand: Box and Blocks Assessment 53     LEFT hand: Box and Blocks Assessment 28     9 Hole Peg Test - RIGHT HAND TIME 16.65   Removing only using lateral pinch    9 Hole Peg Test - LEFT HAND TIME 24.06        Other Outcome Measures Used/Comments    Outcome measure used: Right  to [facility] = 1.5 cm                     ROM and MMT          11/28/2023    16:29 1/4/2024    19:06 1/11/2024    19:18 1/18/2024    17:11 3/26/2024    14:00 4/23/2024    17:00   OT UE ROM Measurements   AROM: Right Shoulder Flexion     150 degrees 135 degrees   AROM: Right Shoulder ABD     135 degrees       unable to move through full sagittal pain 91 degrees       limited by pain   AROM: Right Shoulder IR     20 degrees       hard end feel and pain --        WFL   AROM: Right Shoulder ER     --        WFL    AROM: Right Elbow Flex/Ext     WFL    AROM: Right Wrist Flexion     60 degrees    AROM: Right Wrist Extension     55 degrees    AROM: Right Wrist UD     50 degrees    AROM: Right Wrist RD     20 degrees    OT Cervical/Lumbar/Other ROM Measurements   Other: Girth Measurement/Comments  Right IF: P1-5.9 cm, PIP- 6cm, P2- 5.3 cm, DIP- 5.1 cm // Left IF: P1-5.6 cm, PIP-5.5 cm, P2- 4.9 cm, DIP-4.9 cm // Right MCP [place]- 17.5 cm Right IF: P1-5.8 cm, PIP- 5.9cm, P2- 5.1 cm, DIP- 4.9 // Right MCP [place]- 17.5 cm Right IF: P1-5.9 cm, PIP- 5.9cm, P2- 5.2 cm, DIP- 4.9 // Right MCP [place]- 17.5 cm          .5 cm MF to DPC     OT UE MMT   Left Shoulder Flexion (4+/5) good plus        Left Shoulder Extension (4+/5) good plus        Left Shoulder ADD (4+/5) good plus        Left Shoulder ABD (4+/5) good plus        Left Shoulder IR (4+/5) good plus        Left Shoulder ER (4+/5) good plus        Left Elbow Flexion (4+/5) good plus        Left Elbow Extension (4+/5) good plus        Left Forearm Supination (4+/5) good plus        Left Forearm Pronation (4+/5) good plus        Left Wrist Flexion (4/5) good        Left Wrist Extension  (4/5) good        Left Wrist UD (4/5) good        Left Wrist RD (4/5) good          Outcome Measures          12/14/2023    17:12 1/18/2024    17:11 2/6/2024    16:21 3/26/2024    14:10 4/23/2024    17:12 4/30/2024    21:59   OT OBJECTIVE Outcome Measures   9 Hole Peg Test - Right Hand 180       3 pegs placed 180       14.82- removal only 180       Removal only 32.46   --        Unable to complete   9 Hole Peg Test - Left Hand  25.84       removing only       9 Hole Peg Test - Comments  Patient able to place 5 pegs in pegboard in 3 minutes at right hand Patient placed 3 pegs into board today in 3 minutes      Right Hand Box and Blocks 24 23 23 15 21    Left Hand Box and Blocks    48         Today's Treatment:      OT NEURO FLOW SHEET    EXERCISES  Initial Evaluation  Progress Note 1  Progress Note 2  Re-evaluation CURRENT SESSION  8/16/2023  9/14/2023  10/23/23  11/14/23 TIME   NEURO RE-ED  CPT 17956 TOTAL TIME FOR SESSION 0-7 Minutes   AAROM/AROM PROM and AROM composite flexion at fingers of right hand    AROM tip pinch IF to thumb for improved motor coordination    Strengthening      Strength       Gross Motor Control     Fine Motor Control     Sitting Jennifer/Balance     Standing Jennifer/Balance     Sensory re-education      Retraining     THERE ACT  CPT 41376 TOTAL TIME FOR SESSION 38-52 Minutes   Pain, Vitals, Meds, Etc. Vitals taken, pain assessed/monitored, Rest breaks provided for frustration management    Assessment Patient was 100% full participant in today's re-evaluation with objective metrics assessed as follow: Box and Block, 9 hole peg, and  strength    HEP 11/2/23: Review pinch strengthening positions today for improved carry over    10/26/23: Added soft theraputty for right hand pinch (lateral pinch, tip pinch, 3 jaw patti); patient expressed good understnading    Discussed carry over with use of SF splint    9/28/23: Laterality exercise added to HEP; patient and caregiver expressed good  understanding    Right hand stretching    9/21/2023:   -Educated on use of rice/bean sensory bin, massage, sensory re-education with deep pressure and textures. Issued tubigrip for RUE sensory re-education and advised to wear 1x per day. Continued to discuss edema management.     Functional Mobility     Transfers     THERE EX  CPT 68573 TOTAL TIME FOR SESSION 0-7 Minutes   PROM     Activity Tolerance     SELF-CARE  CPT 96406 TOTAL TIME FOR SESSION 8-22 Minutes   Pt Education/Safety     ADL Training     IADL Training Patient participated in dressing set-up task with  from home today; she demonstrates ability to use RUE as functional assist and remove clothing from  without assistance.     MODALITIES  CPT 76172 TOTAL TIME FOR SESSION Not performed   Ice/Heat     ATTENDED E-STIM  CPT 28726 TOTAL TIME FOR SESSION Not performed   Attended E-Stim     SPLINTING  CPT 63386 TOTAL TIME FOR SESSION Not performed   Fabrication/Check Out     Fit     Training     GROUP  CPT 18635 TOTAL TIME FOR SESSION Not performed             Normative Range for Female 75+ Years of Age       Right Hand   Left Hand      Initial Evaluation Progress Note 1 Norm Initial Evaluation Progress Note 1 Norm    Strength 0 lbs 0 lbs 42.6 lbs 30 lbs 30 lbs 37.6 lbs   Lateral Pinch N/T 2 lbs 12.6 lbs N/T 14 lbs 11.4 lbs   Three Jaw Yonatan N/T 2 lbs (assist for position) 12 lbs N/T 11 lbs 11.5 lbs   Tip Pinch N/T N/T 9.6 lbs N/T 9 lbs 9.3 lbs   Box and Block 4 blocks 22 blocks 65 blocks 50 blocks 56 blocks 63.6 blocks   Nine Hole Peg (71+ age) Unable to complete Unable to complete 22.49 seconds N/T 24.55 seconds 24.11 seconds       Goals:     Goals        OT Neuro/Deconditioning Goals        Short Term Goals Time Frame Result Comment/Progress   Assess gross motor control via Box and Block Assessment  4 weeks Met 9/14: R: 22; L: 56   Assess fine motor control via 9 hole peg test 4 weeks Met  9/14: R: Unable; L: 24.55   Improved automatic fine  motor use at right hand with routine tasks (e.g. eating, writing, brushing teeth) to 25% of baseline  4 weeks Ongoing     Explore adaptive visual strategies to encourage looking to right side 4 weeks Met    Incorporate right hand in at least 2 self-care activities 4 weeks Met    Carry over HEP at least 3 times per week  4 weeks Met     Score at least 32 on Box and block test 4 weeks Ongoing 2/7/24: 23 blocks  3/26/24: 15 blocks  4/23/24: 21 blocks   Increase right hand  strength to 8 lbs 4 weeks Met    Engage in bilateral fine motor coordination task to open food or self-care containers with supervision during 4/6 trials for increased independence with meal preparation.  4 weeks New goal    Complete at least 4 activities from [facility] modified constraint induced movement therapy activity log across 3 weeks with assistance as needed. 4 weeks Met    Engage in simulated or real life food cutting activity using bilateral hands with no more than minimal assistance x 2 trials.  4 weeks Met    Perform RUE scapular internal rotation through at least 45 degrees with no more than 1 point increase in pain in order to promote joint mobilization and reduce scapular adhesion.  4 weeks New goal RE 2/26/24: 20 degrees, hard end feel and pain   Complete right hand three jaw patti or tip pinch activity to retrieve 1x1 inch block and place in target area with short opponens splint donned or manual inhibition of excessive opposition x 10 repetitions across 2 trials for improved coordination 4 weeks New goal       Long Term Goals Time Frame Result Comment/Progress   Improve gross motor control as evidenced by at least 10 block improved at RUE with Box and Block Assessment for improved ability to perform ADLs and IADLs  12 weeks Met     Patient will complete 9 hole peg test in under 3 minutes 12 weeks Ongoing    Decrease distance from Right MF to DPC to 2 cm for improved functional grasp at right hand 12 weeks Met     Improved automatic  fine motor use at right hand with routine tasks (e.g. eating, writing, brushing teeth) to 75% of baseline 12 weeks Ongoing     Increase right hand  strength to at least 15 lbs 12 weeks Ongoing 3/26/24: 6 lbs  4/23/24: 11 lbs   Increase Box and Block score to at least 40 12 weeks Ongoing 2/7/24: 23 blocks  3/26/24: 15 blocks                      This 79 y.o. year old female presents to OT with above stated diagnosis. Occupational Therapy evaluation reveals coordination impaired, impaired cognition, decreased flexibility, sensation decreased resulting in self-care, home management, community/leisure limitations. Jennifer Sams will benefit from skilled OT services to address limitation, work towards rehab and patient goals and maximize PLOF of chosen ADLs.    Planned Services: The patients treatment will include CPT 90595 Neuromuscular Reeducation, CPT 57740 Self-care/Home management training, CPT 51169 Therapeutic activities, CPT 14236 Hot/Cold Packs therapy, .    Barry Rosas OT         Current Participants as of 5/1/2024    Name Type Comments Contact Info    Barry Rosas OT Occupational Therapist      Signature pending    Chris Wang MD Referring Physician  [phone]    Signature pending

## 2023-11-14 NOTE — LETTER
Dear DR. Haywood,    Thank you for this referral. Please review the attached notes and plan of care for your approval.  Please contact our department with any questions.     Sincerely,     Barry Rosas OT  [address]  [address]  [facility] [address]  Fax  [phone]      By co-signing this Plan of Care (POC) you agree to the following:  I have reviewed the the Plan of Care established by the therapist within this document and certify that the services are skilled and medically necessary. I have reviewed the plan and recommend that these services continue to meet the goals stated in this document.    PHYSICIAN SIGNATURE: __________________________________     DATE: ___________________  TIME: _____________      The following plan is in draft form.  Please refer to the current version for the most up-to-date information.            Occupational Therapy Plan of Care 23   Effective from: 2023  Effective to: 2024    Draft  Plan ID: [id number]           Participants as of 2024    Name Type Comments Contact Info    Barry Rosas OT Occupational Therapist      Ney Aparicio MD Referring Provider  [phone]      Last Progress Notes Note     Author: Barry Rosas OT Status: Signed Last edited: 2024  2:00 PM       Duplicate note for purposes of resending historical OT POC.    Occupational Therapy Progress Note    [facility] Fax: [phone]    OT RE-EVALUATION FOR OUTPATIENT THERAPY    Patient: Jennifer Sams   MRN: [id number]  : 1945 79 y.o.    Referring Physician: Liv Haywood, *  Date of Visit: 2023    New Certification Dates: 23 through 24    Recommended Frequency & Duration:  2 times/week for up to 3 months         Diagnosis:   1. Lack of coordination    2. Acute ischemic left MCA stroke (CMS/[facility])        Chief Complaints:  No chief complaint on file.      Precautions:        TODAY'S VISIT:    Time In  Session:  Start Time: 1405  Stop Time: 1500  Time Calculation (min): 55 min   General Information - 11/14/23 1419          Session Details    Document Type daily treatment        General Information    Onset of Illness/Injury or Date of Surgery 06/26/23     Referring Physician Dr. Haywood     History of present illness/functional impairment The patient is a 78-year-old  female with a history of AAA, heart block s/p cardiac pacemaker, glaucoma, hyperlipidemia, coronary artery disease, NSTEMI, who presented to [facility] on June 26 after she has not been seen by her friends for a few days.  Patient lives alone and when she was found by EMS she was confused and combative.  In the emergency department she was aphasic with right-sided weakness and hypertensive to the 190 systolic.  CT scan of the head revealed an acute infarct in left MCA with mild bleeding.  Echo showed an EF of 43% and a bubble study was negative.  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  Later this was transitioned to apixaban.  She was continued on statin for secondary prevention.  She required virtual shivani while in the hospital but this was not effective.  She was evaluated by speech therapy and cleared for a soft and bite-size diet with moderately thick liquids.'     Patient/Family/Caregiver Comments/Observations Patient presents to OT session and is agreeable to re-eval         Services    Do You Speak a Language Other Than English at Home? no                      Pain/Vitals - 11/14/23 1419          Pain Assessment    Currently in pain No/Denies                    OT - 11/14/23 1419          Occupational Therapy Encounter Type Details    Occupational Therapy Specialty Traditional Neuro Program OT        OT Frequency and Duration    Frequency of treatment 2 times/week     OT Duration 3 months     OT Cert From 11/14/23     OT Cert To  02/12/24     Date OT POC was sent to provider 11/14/23     Signed OT Plan of Care received?  Yes                    Assessment - 11/14/23 3932          Assessment    Plan of Care reviewed and patient/family in agreement Yes     Comments Patient is in agreeement     System Pathology/Pathophysiology Noted neuromuscular     Functional Limitations in Following Categories self-care;home management;community/leisure     Problem List: Occupational Therapy coordination impaired;impaired cognition;decreased flexibility;sensation decreased     Rehab Potential/Prognosis: Occupational Therapy good, to achieve stated therapy goals     Clinical Assessment Patient seen in OP OT for re-evaluation and arrives unaccompanied today. Patient was seen for initial evaluation on 8/16/23 and presented with significant impairments in functional use of RUE including deficits in sensation, strength, fine and gross motor control. Patient has been seen for 17 OT visits including today. Objective metrics assessed today as follow: Box and Block score at left hand is 53 blocks today (IE: 30 blocks, norms: 63.6 blocks) and at right hand is 28 blocks (IE: 4, norms: 65); 9 hole peg test score at 24.06 sec (norms: 24.11 sec); right hand unable to complete test and modified to only include removing pegs, with improvement to 16.65 seconds todays as compared to 48.6 seconds at first measurement. AROM at right hand has improved per distance from MF to DPC, measured at 1.5 cm today as compared to 6.5 cm at IE. Right hand  strength has improved to 5.3 lbs as compared to 0 lbs at IE, and left hand  strength has improved to 36 lbs as compared to 30 lbs at IE. Subjectively patient reports improvement in independence at home with ADLs and with increase in bilateral coordination for performing routine task including tying her shoes, drying off after showering, and performing dressing. Patient continues to experience neuromuscular deficits at RU impairing  performance with ADL, IADL and leisure occupational performance at this time. Recommend exension of OT POC 2x/week for 3 months and patient is agreeable to plan.     Plan and Recommendations Extend OT POC for improved RUE ROM, strength, coordination, and functional engagement in ADL/IADL/leisure     Planned Services CPT 46738 Neuromuscular Reeducation;CPT 52775 Self-care/Home management training;CPT 74817 Therapeutic activities;CPT 78929 Hot/Cold Packs therapy                     OBJECTIVE MEASUREMENTS/DATA:    BADL/IADL    BADL/IADL - 11/14/23 1420          BADL Interventions Assessment    Upper Body Dressing Modified independence     Lower Body Dressing Modified independence     Lower body dressing comments Intermittent success with tying shoes; taking 10 minutes to get dressing     Bathing Minimum assist (75% patient effort)     Bathing comments Patient reports attempting to wash her hair but limited by impaired by impaired sensation at right side of head     Toileting Independent     Grooming Minimum assist (75% patient effort)     Grooming comments Usually automatic toothbrush. Able to brush hair but difficulty with blowing hair     Eating Minimum assist (75% patient effort)     Eating comments diet is soft food only; requires assist with cutting into pieces. Incorporating RUE minimally        IADL/Home Interventions Assessment    Home management/maintenance Minimum assist (75% patient effort)     Home management/maintenance comments Difficulty with hand washing dishes, using LUE for dusting, gets assistance with vacuuming due to heart condition.     Meal prep / clean up Maximum assist (25% patient effort)     Meal prep / clean up comments Able to reheat in microwave but not preparing food at this time, hot or cold                   Gross and Fine Motor     Gross and Fine Motor - 11/14/23 1420          Hand  Strength Testing    Left Hand, Setting 2 34/36/38; Average = 36 lbs     Right Hand, Setting 2 6/5/5;  Average = 5.3 lbs                   Outcome Measures    Outcome Measures - 11/14/23 1420          Objective Outcome Measures    RIGHT hand: Box and Blocks Assessment 53     LEFT hand: Box and Blocks Assessment 28     9 Hole Peg Test - RIGHT HAND TIME 16.65   Removing only using lateral pinch    9 Hole Peg Test - LEFT HAND TIME 24.06        Other Outcome Measures Used/Comments    Outcome measure used: Right MF to DPC = 1.5 cm                     ROM and MMT          11/28/2023    16:29 1/4/2024    19:06 1/11/2024    19:18 1/18/2024    17:11 3/26/2024    14:00 4/23/2024    17:00   OT UE ROM Measurements   AROM: Right Shoulder Flexion     150 degrees 135 degrees   AROM: Right Shoulder ABD     135 degrees       unable to move through full sagittal pain 91 degrees       limited by pain   AROM: Right Shoulder IR     20 degrees       hard end feel and pain --        WFL   AROM: Right Shoulder ER     --        WFL    AROM: Right Elbow Flex/Ext     WFL    AROM: Right Wrist Flexion     60 degrees    AROM: Right Wrist Extension     55 degrees    AROM: Right Wrist UD     50 degrees    AROM: Right Wrist RD     20 degrees    OT Cervical/Lumbar/Other ROM Measurements   Other: Girth Measurement/Comments  Right IF: P1-5.9 cm, PIP- 6cm, P2- 5.3 cm, DIP- 5.1 cm // Left IF: P1-5.6 cm, PIP-5.5 cm, P2- 4.9 cm, DIP-4.9 cm // Right MCP [place]- 17.5 cm Right IF: P1-5.8 cm, PIP- 5.9cm, P2- 5.1 cm, DIP- 4.9 // Right MCP [place]- 17.5 cm Right IF: P1-5.9 cm, PIP- 5.9cm, P2- 5.2 cm, DIP- 4.9 // Right MCP [place]- 17.5 cm          .5 cm MF to DPC     OT UE MMT   Left Shoulder Flexion (4+/5) good plus        Left Shoulder Extension (4+/5) good plus        Left Shoulder ADD (4+/5) good plus        Left Shoulder ABD (4+/5) good plus        Left Shoulder IR (4+/5) good plus        Left Shoulder ER (4+/5) good plus        Left Elbow Flexion (4+/5) good plus        Left Elbow Extension (4+/5) good plus        Left Forearm Supination (4+/5) good plus         Left Forearm Pronation (4+/5) good plus        Left Wrist Flexion (4/5) good        Left Wrist Extension (4/5) good        Left Wrist UD (4/5) good        Left Wrist RD (4/5) good          Outcome Measures          12/14/2023    17:12 1/18/2024    17:11 2/6/2024    16:21 3/26/2024    14:10 4/23/2024    17:12 4/30/2024    21:59   OT OBJECTIVE Outcome Measures   9 Hole Peg Test - Right Hand 180       3 pegs placed 180       14.82- removal only 180       Removal only 32.46   --        Unable to complete   9 Hole Peg Test - Left Hand  25.84       removing only       9 Hole Peg Test - Comments  Patient able to place 5 pegs in pegboard in 3 minutes at right hand Patient placed 3 pegs into board today in 3 minutes      Right Hand Box and Blocks 24 23 23 15 21    Left Hand Box and Blocks    48         Today's Treatment:      OT NEURO FLOW SHEET    EXERCISES  Initial Evaluation  Progress Note 1  Progress Note 2  Re-evaluation CURRENT SESSION  8/16/2023  9/14/2023  10/23/23  11/14/23 TIME   NEURO RE-ED  CPT 56936 TOTAL TIME FOR SESSION 0-7 Minutes   AAROM/AROM PROM and AROM composite flexion at fingers of right hand    AROM tip pinch IF to thumb for improved motor coordination    Strengthening      Strength       Gross Motor Control     Fine Motor Control     Sitting Jennifer/Balance     Standing Jennifer/Balance     Sensory re-education      Retraining     THERE ACT  CPT 64944 TOTAL TIME FOR SESSION 38-52 Minutes   Pain, Vitals, Meds, Etc. Vitals taken, pain assessed/monitored, Rest breaks provided for frustration management    Assessment Patient was 100% full participant in today's re-evaluation with objective metrics assessed as follow: Box and Block, 9 hole peg, and  strength    HEP 11/2/23: Review pinch strengthening positions today for improved carry over    10/26/23: Added soft theraputty for right hand pinch (lateral pinch, tip pinch, 3 jaw patti); patient expressed good understnading    Discussed carry over with  use of SF splint    9/28/23: Laterality exercise added to HEP; patient and caregiver expressed good understanding    Right hand stretching    9/21/2023:   -Educated on use of rice/bean sensory bin, massage, sensory re-education with deep pressure and textures. Issued tubigrip for RUE sensory re-education and advised to wear 1x per day. Continued to discuss edema management.     Functional Mobility     Transfers     THERE EX  CPT 81752 TOTAL TIME FOR SESSION 0-7 Minutes   PROM     Activity Tolerance     SELF-CARE  CPT 29967 TOTAL TIME FOR SESSION 8-22 Minutes   Pt Education/Safety     ADL Training     IADL Training Patient participated in dressing set-up task with  from home today; she demonstrates ability to use RUE as functional assist and remove clothing from  without assistance.     MODALITIES  CPT 13750 TOTAL TIME FOR SESSION Not performed   Ice/Heat     ATTENDED E-STIM  CPT 87865 TOTAL TIME FOR SESSION Not performed   Attended E-Stim     SPLINTING  CPT 47046 TOTAL TIME FOR SESSION Not performed   Fabrication/Check Out     Fit     Training     GROUP  CPT 94371 TOTAL TIME FOR SESSION Not performed             Normative Range for Female 75+ Years of Age       Right Hand   Left Hand      Initial Evaluation Progress Note 1 Norm Initial Evaluation Progress Note 1 Norm    Strength 0 lbs 0 lbs 42.6 lbs 30 lbs 30 lbs 37.6 lbs   Lateral Pinch N/T 2 lbs 12.6 lbs N/T 14 lbs 11.4 lbs   Three Jaw Yonatan N/T 2 lbs (assist for position) 12 lbs N/T 11 lbs 11.5 lbs   Tip Pinch N/T N/T 9.6 lbs N/T 9 lbs 9.3 lbs   Box and Block 4 blocks 22 blocks 65 blocks 50 blocks 56 blocks 63.6 blocks   Nine Hole Peg (71+ age) Unable to complete Unable to complete 22.49 seconds N/T 24.55 seconds 24.11 seconds       Goals:     Goals        OT Neuro/Deconditioning Goals        Short Term Goals Time Frame Result Comment/Progress   Assess gross motor control via Box and Block Assessment  4 weeks Met 9/14: R: 22; L: 56   Assess fine  motor control via 9 hole peg test 4 weeks Met  9/14: R: Unable; L: 24.55   Improved automatic fine motor use at right hand with routine tasks (e.g. eating, writing, brushing teeth) to 25% of baseline  4 weeks Ongoing     Explore adaptive visual strategies to encourage looking to right side 4 weeks Met    Incorporate right hand in at least 2 self-care activities 4 weeks Met    Carry over HEP at least 3 times per week  4 weeks Met     Score at least 32 on Box and block test 4 weeks Ongoing 2/7/24: 23 blocks  3/26/24: 15 blocks  4/23/24: 21 blocks   Increase right hand  strength to 8 lbs 4 weeks Met    Engage in bilateral fine motor coordination task to open food or self-care containers with supervision during 4/6 trials for increased independence with meal preparation.  4 weeks New goal    Complete at least 4 activities from [facility] modified constraint induced movement therapy activity log across 3 weeks with assistance as needed. 4 weeks Met    Engage in simulated or real life food cutting activity using bilateral hands with no more than minimal assistance x 2 trials.  4 weeks Met    Perform RUE scapular internal rotation through at least 45 degrees with no more than 1 point increase in pain in order to promote joint mobilization and reduce scapular adhesion.  4 weeks New goal RE 2/26/24: 20 degrees, hard end feel and pain   Complete right hand three jaw patti or tip pinch activity to retrieve 1x1 inch block and place in target area with short opponens splint donned or manual inhibition of excessive opposition x 10 repetitions across 2 trials for improved coordination 4 weeks New goal       Long Term Goals Time Frame Result Comment/Progress   Improve gross motor control as evidenced by at least 10 block improved at RUE with Box and Block Assessment for improved ability to perform ADLs and IADLs  12 weeks Met     Patient will complete 9 hole peg test in under 3 minutes 12 weeks Ongoing    Decrease distance from Right  MF to DPC to 2 cm for improved functional grasp at right hand 12 weeks Met     Improved automatic fine motor use at right hand with routine tasks (e.g. eating, writing, brushing teeth) to 75% of baseline 12 weeks Ongoing     Increase right hand  strength to at least 15 lbs 12 weeks Ongoing 3/26/24: 6 lbs  4/23/24: 11 lbs   Increase Box and Block score to at least 40 12 weeks Ongoing 2/7/24: 23 blocks  3/26/24: 15 blocks                      This 79 y.o. year old female presents to OT with above stated diagnosis. Occupational Therapy evaluation reveals coordination impaired, impaired cognition, decreased flexibility, sensation decreased resulting in self-care, home management, community/leisure limitations. Jennifer Sams will benefit from skilled OT services to address limitation, work towards rehab and patient goals and maximize PLOF of chosen ADLs.    Planned Services: The patients treatment will include CPT 37021 Neuromuscular Reeducation, CPT 08228 Self-care/Home management training, CPT 57812 Therapeutic activities, CPT 31148 Hot/Cold Packs therapy, .    Barry Rosas, OT

## 2023-11-14 NOTE — LETTER
"Dear DR. Haywood,    Thank you for this referral. Please review the attached notes and plan of care for your approval.  Please contact our department with any questions.     Sincerely,     Magnolia Lundy, CCC-SLP  [address]  [address]  [place] [address]    By co-signing this Plan of Care (POC) you agree to the following:  I have reviewed the the Plan of Care established by the therapist within this document and certify that the services are skilled and medically necessary. I have reviewed the plan and recommend that these services continue to meet the goals stated in this document.    PHYSICIAN SIGNATURE: __________________________________     DATE: ___________________  TIME: _____________           Speech Therapy Plan of Care 23   Effective from: 2023  Effective to: 2024    Plan ID: [id number]                Participants as of Finalize on 2023      Name Type Comments Contact Info    Liv Haywood MD Referring Provider  [phone]    Magnolia Lundy CCC-SLP Speech Language Pathologist      Chris Wang MD PCP - General  [phone]           Last Progress Notes Note       Author: Magnolia Lundy CCC-SLP Status: Signed Last edited: 2023  1:00 PM         Speech-Language Pathology Progress Note    [facility] Fax: [phone]    SLP RE-EVALUATION FOR OUTPATIENT THERAPY    Patient: Jennifer Sams   MRN: [id number]  : 1945 78 y.o.    Referring Physician: Liv Haywood, *  Date of Visit: 2023    New Certification Dates: 23 through 24    Recommended Frequency & Duration:  2 times/week for up to 3 months   PN: 2023    Diagnosis:   1. Cerebrovascular accident (CVA), unspecified mechanism (CMS/HCC)        Chief Complaints:   Chief Complaint   Patient presents with    Speech Problem     Language, speech. \"I can't say it\"       Precautions:   Precautions Comments: aphasia, apraxia, pacemaker    TODAY'S " VISIT:    Session Time:  Start Time: 1300  Stop Time: 1400  Time Calculation (min): 60 min   General Information - 11/14/23 1300          Session Details    Document Type re-evaluation     Mode of Treatment individual therapy        General Information    Onset of Illness/Injury or Date of Surgery 06/26/23     Referring Physician Dr. Haywood     History of present illness/functional impairment Jennifer is a 78 y.o. female who presented to [facility] on June 26 after she has not been seen by her friends for a few days.  Patient lives alone and when she was found by EMS she was confused and combative.  In the emergency department she was aphasic with right-sided weakness and hypertensive to the 190 systolic.  CT scan of the head revealed an acute infarct in left MCA with mild bleeding.  Echo showed an EF of 43% and a bubble study was negative.  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 Pt transferred to  [facility] on 7/1/2023 Principal problem  Acute ischemic left MCA stroke (CMS/HCC). PMH AAA, heart block s/p cardiac pacemaker, glaucoma, hyperlipidemia, coronary artery disease, She was evaluated by speech therapy and cleared for a soft and bite-size diet with moderately thick liquids.  7/2/2023:  Diet SB6 with mildly thick liquids (MT2)  7/6/2023: Upgraded to EC7 with thin liquids.  Pt discharged home with 24 hr care and home health services recieving speech therapy for motor speech and aphasia.     Precautions Comments aphasia, apraxia, pacemaker     Patient/Family/Caregiver Comments/Observations Pt attended independently this date.  Able to communicate that Thelma BECERRIL was out running errands while pt attended therapies.  Additionally communicated concerns in regard to poor sleep, conveyed feels tired but mind does not stop.  Pt expressed disinterest in mediations to aid in sleep.                    Daily Falls Screen - 11/14/23 1016          Daily Falls Assessment    Patient  reported fall since last visit No                    Pain and Vitals - 11/14/23 1015          Pain Assessment    Currently in pain No/Denies                    SLP - 11/14/23 1300          Speech Therapy    Speech Therapy Specialty Traditional Neuro Program ST        SLP Frequency and Duration    Frequency of treatment 2 times/week     Speech Duration 3 months     SLP Custom Frequency and Duration PN: 12/14/2023     SLP Cert From 11/14/23     SLP Cert To 02/12/24     Date SLP POC was sent to provider 11/14/23     Signed SLP Plan of Care received?  No                    Assessment - 11/14/23 1130          Assessment    Plan of Care reviewed and patient/family in agreement Yes     Comments Plan of care was recommended to pt to continue 2 times a week for 3 months.  Pt was in agreement.     Rehab Potential/Prognosis (SLP) adequate, monitor progress closely     Problem List Impaired communication     Clinical Assessment  has demonstrated progression in motor speech, expressive and receptive language.  Ms. Sams continues to present with a moderate receptive and expressive aphasia complicated by severe motor speech deficits.  It is recommended that Ms. Sams continue to attend speech and language therapy for further functional communication within home and community.     Plan and Recommendations Initiate revised goals.     Planned Services CPT 63098 Speech Lang Voice Comm and/or Auditory Proc (Treatment of speech, language, voice, communication and/or hearing processing disorder)                     OBJECTIVE MEASUREMENTS/DATA:    OM: FCM/Voice/Neuro    Outcome Measures - 11/14/23 1015          Functional Communication Measures    FCM: Motor Speech 3-->Level 3   approaching 4    FCM: Spoken Language, Comprehension 4-->Level 4     FCM: Spoken Language, Expression 3-->Level 3   approaching 4                    Outcome Measures          8/16/2023    17:42 8/25/2023    16:57 11/14/2023    10:15   SLP Outcome Measures    FCM: Motor Speech 2-->Level 2       Goal L4  3-->Level 3       approaching 4   FCM: Reading 3-->Level 3       Goal: maintain 3, indirect therapy, improve to 4.     FCM: Spoken Language, Comprehension --        TBD, suspect L2, goal, indirect therapy-4 2-->Level 2 4-->Level 4   FCM: Spoken Language, Expression 2-->Level 2  3-->Level 3       approaching 4       Today's Treatment:    BDAE  Eval: 8/18/2023     RE: 11/14/2023     Aphasia Severity Rating Scale 1/5  2/5   CONVERSATIONAL and EXPOSITORY SPEECH       Simple Social Responses Raw Score:   Percentiles:  Raw Score: 6/7  Percentiles: 50%tile   Complexity Index  Raw Score:    Ratio:   Percentiles:      Narrative Discourse  Short form Raw Score:   Ratio:   Percentiles:      AUDITORY COMPREHEN   Basic Word Discrimination     Raw Score: 29/37  Percentiles:20%     Raw Score: 33/37  Percentiles:40-50%   Commands  Standard Form  8/25/2023 Raw Score: 3/15  Percentiles: 0-10% Raw Score: 7/15  Percentiles: 10-20%     Complex Ideational Material  Short Form  8/25/2023    Raw Score: 0/4  Percentiles: 0%     Raw Score: 5/6  Percentiles: 70%      ARTICULATION  Agility                ORAL EXPRESSION  Recitation, Yolis, Rhythm                 Automatized Sequences  Standard Form    Recitation: 0  Percentiles: 30%tile  Yolis: 1  Percentiles: 30%tile  Rhythm: 1  Percentiles: 60%tile     Raw Score: 0/8  Percentiles: 0%tile     Recitation: 0  Percentiles: 30%tile  Yolis: 2  Percentiles: 100%tile  Rhythm: 1  Percentiles: 60%tile    Short Form  Raw Score: 2/4  Percentiles: 20%tile   REPETITION  Words  Standard Form     Repetition of Nonsense words     Sentences  Standard Form    Raw Score: 0  Percentiles:0 %tile     Raw Score:  Percentiles:      Raw Score:  Percentiles:     Raw Score: 3/10  Percentiles:10 %tile                     NAMING  Responsive Naming  Standard Form     [place] Naming Test  Standard Form    Raw Score:   Percentiles:      Raw Score:   Stim cue:   PC:   MC:    Percentiles:   Mean:   Short Form   Raw Score: 0/5  Percentiles:10% tile            READING  Match case and scripts     Number matching     Picture-Word Matching     Oral Word Reading     Oral Sentence Reading     Oral Sentence Comp     Sentence/Paragraph     Comprehension    Raw Score: 7  Percentiles: 40%tile  Raw Score:   Percentiles:   Raw Score: 10  Percentiles: 100%tile  Raw Score:   Percentiles:   Raw Score:   Percentiles:   Raw Score:   Percentiles:   Raw Score:   Percentiles:   Raw Score:   Percentiles:     Raw Score: 8  Percentiles: 100%tile  Raw Score: 8/12  Percentiles: 10  Raw Score: 7  Percentiles: 20%tile    WRITING  Form     Letter Choice     Motor Facility     Primer Words     Regular Phonics     Common Irregular Words     Written Picture Naming     Narrative Writing    Raw Score:   Percentiles:   Raw Score:   Percentiles:   Raw Score:   Percentiles:   Raw Score:   Percentiles:   Raw Score:   Percentiles:   Raw Score:   Percentiles:   Raw Score:   Percentiles:   Raw Score:   Percentiles:           IE: 2023 RE: 2023   Rating Scale Profile of Speech Characteristics 1-7 scale 1-7 scale   Articulatory Agility-phoneme and syllable level  2-37   Phrase Length: longest word runs 7 37   Grammatical Form: Variety and use of morphemes  27   Melodic Line (Prosody)  6-7   Paraphasia in Running Speech   2/7   Word Finding Relative to Fluency  3/7   Sentence Repetition  17   Auditory Comprehension  3/7     Aphasia Severity Ratin/5-   Conversations about familiar subjects is possible with help from the listener.  There are frequent failures to convey the idea, but the patient shares the burden of communication.     Provided a direct model and visual cue  Sound Initial Medial Final    p + park      b -  way/bay     t -  lung/tongue     d + day     k -  bambi/kiss     g -  olf/golf     m -  eat/meat     n - durse/nurse     ng      f - mehreen/fam     v -  ses/vest     th-vl +   "think     th-v -  leese/these     s + sew     z -  mina/zoo     sh +  shirt     ch +  chair     dg +  Bean     l -  Yuli/rollins     r/ir/er +  rake     w -  dzatch/watch     bl +  blow     br      dr      fr      gl      gr      kr      kw      nt      pl      pr      sl      sp      st      sw      tr                             Goals:     Goals Addressed                   This Visit's Progress     SLP Motor Goals           NEW /Revised RE=Evaluation: 2023   Goals Short-Long Time Frame Result Comment   Will improve FCM in the following:  MOTOR SPEECH  Current:3-4  Goal:4-5  SPOKEN LANG COMP  Current:4  Goal: 5  SPOKEN LANGUAGE EXP:  Current:3  Goal:4 LTG 12 w     Pt will improve Aphasia Severity Rating Scale from 2/5 to >/=3/5 LTG 12 w     MOTOR SPEECH:  Provided Auditory bombardment, picture stimulus, direct visual/aud model, pt will produce target words with /p,b,m,w,t,d,n,s,l/ in initial position on first trial, 80% of presentations.  STG 4-6 w     Fading cues from above goal: Pt will produce CVCV, VCV and CVC syllables 80% of presentations.  STG 6-8 w     SPOKEN LANG EXP  Given simple picture cue pt will produce simple sentences of 3-4 words with simplified grammatical form and approximated content words to convey meaning, Min A, 8% of presentations.  STG 6-8     Pt will produce simple automatics provided mod A:  1-20  A-J  MALLY   STG 6-8     Pt will produce simple social responses, Mod A:  Name     Address (parts) STG      SPOKEN LANG COMP  The pt will demonstrate understanding of 2 noun in simple and present and progressive reversible sentences F4, 80% accy.   (Advanced language L1-2 Identify)  STG      Provided body parts (on paper), single letters, digits in 10's/100's place value, pt will demonstrate  understanding of \"point to/touch/pick up___\" commands 85% of trials.  STG      FCM: MOTOR SPEECH:  L3:  The communication partner must assume primary responsibility for interpreting the communication " exchange, however the individual is able to produce short consonant-vowel combinations or automatic words intelligibly. With consistent MODERATE cueing, the individual can produce simple words and phrases intelligibly, although accuracy may vary.         FCM: Spoken Language Expression   L3: The communication partner must assume responsibility for structuring the communication exchange, and with consistent and moderate cueing, the individual can produce words and phrases that are appropriate and meaningful in context.     FCM: Spoken Language Comprehension  L4: The individual consistently responds accurately to simple yes/no questions and occasionally follows simple directions without cues.  Moderate contextual support is usually needed to understand complex sentences/messages.  The individual is able to understand limited conversations about routine daily activities with familiar communication partners.           Time Frame  Result  Comment/Progress    Will improve FCM in the following:  MOTOR SPEECH  Current:3  Goal:4-5  SPOKEN LANG COMP  Current:2  Goal: 2-3 LTG- 12 w Partially Met RE:: 11/14/2023  MOTOR SPEECH: 3, emerging to 4    SPOKEN LANG COMP:  4     Demonstrate effective speech intelligibility to communicate at single word and functional phrase level with (min A) use of compensatory strategies with familiar listeners  LTG- 12 w MET RE:: 11/14/2023  Aphasia Severity scale, 2/5     Pt will produce bilabials /p, b, m, w/, tip alveolars /t, d, n/ and tense vowels /a, e, I, o,u/ in isolation provided mod A, 80% of trials.  STG-4w   Partially Met  RE:: 11/14/2023  Isolation.  Provided direct model with visual cues pt can produce, but inconsistent due to difficulty understanding need to imitate.   Vowels: MET   Pt will produce VC and CV syllables provided the above phonemes, Mod A, 80% of trials STG- 6-8 w   Partially Met  RE:: 11/14/2023  Benefits from visual picture, direct model and visual cues.    Pt will  "produce single and bisyllablic words consisting of CVCV, VCV and CVC, Mod A, 80% of presentations.  STG 8-10   Partially Met  RE:: 2023  Benefits from visual picture, direct model and visual cues and initial phonemic cue for 88% accuracy.   Pt will produce simple automatics provided mod A:  1-10  A-G  Yolis and identifiable syllable combinations for MALLY  STG- 8-10  Partially Met, continue with goal  RE:: 2023  1-10, S--min A  A-/6 min A  Mon-Sun- Min A    Yolis- MET      Pt will produce small set of functional words/phrases in unison or in imitation with SLP, then produce 3  successive attempts with 80% accuracy  STG 8-10   Not Met, improving  RE:: 2023  75% provided DM, fade cues.  Improving with reading from flash cards. Pt has not been able to perform 3 productions.    Auditory Comprehension and reading to be further assessed for determination of goals to support speech production    GOAL: 2023  Given direct model, repetition and NV reinforcement, Pt will follow 1-step commands for motor speech production up to 1-2 syllable words, 80% of presentations.     The pt will demonstrate understanding of 1 noun in simple and present and progressive sentences F4, 80% accy.   (Advanced language L1-2 Identify)                 4-6 w              MET          MET            New  10/17/23              PN: 10/17/2023          RE: 2023  Present progressive: 100%  2 nouns [id number]%  NEXT 2 nouns reversible.    GOAL: 2023  Provided pictures/objects, and/or body parts, pt will demonstrate  understanding of \"point to___\" and \" ___\" 85% of trials.     8 w        Partially Met          RE:: 2023  Pictures and objects,    F4:100%  Body parts: 80%   NEW:   Pt will answer Y/N questions to general knowledge questions 90% of presentations.  New: 10/17/2023 Met RE:: 2023                   Magnolia Lundy, CCC-SLP         Current Participants as of 2023      Name " Type Comments Contact Info    Liv Haywood MD Referring Provider  [phone]    Signature pending    Magnolia Lundy CCC-SLP Speech Language Pathologist      Signature pending    Chris Wang MD PCP - General  [phone]    Signature pending

## 2023-11-14 NOTE — OP SLP TREATMENT LOG
BDAE  Eval: 8/18/2023     RE: 11/14/2023     Aphasia Severity Rating Scale 1/5  2/5   CONVERSATIONAL and EXPOSITORY SPEECH       Simple Social Responses Raw Score:   Percentiles:  Raw Score: 6/7  Percentiles: 50%tile   Complexity Index  Raw Score:    Ratio:   Percentiles:      Narrative Discourse  Short form Raw Score:   Ratio:   Percentiles:      AUDITORY COMPREHEN   Basic Word Discrimination     Raw Score: 29/37  Percentiles:20%     Raw Score: 33/37  Percentiles:40-50%   Commands  Standard Form  8/25/2023 Raw Score: 3/15  Percentiles: 0-10% Raw Score: 7/15  Percentiles: 10-20%     Complex Ideational Material  Short Form  8/25/2023    Raw Score: 0/4  Percentiles: 0%     Raw Score: 5/6  Percentiles: 70%      ARTICULATION  Agility                ORAL EXPRESSION  Recitation, Yolis, Rhythm                 Automatized Sequences  Standard Form    Recitation: 0  Percentiles: 30%tile  Yolis: 1  Percentiles: 30%tile  Rhythm: 1  Percentiles: 60%tile     Raw Score: 0/8  Percentiles: 0%tile     Recitation: 0  Percentiles: 30%tile  Yolis: 2  Percentiles: 100%tile  Rhythm: 1  Percentiles: 60%tile    Short Form  Raw Score: 2/4  Percentiles: 20%tile   REPETITION  Words  Standard Form     Repetition of Nonsense words     Sentences  Standard Form    Raw Score: 0  Percentiles:0 %tile     Raw Score:  Percentiles:      Raw Score:  Percentiles:     Raw Score: 3/10  Percentiles:10 %tile                     NAMING  Responsive Naming  Standard Form     [place] Naming Test  Standard Form    Raw Score:   Percentiles:      Raw Score:   Stim cue:   PC:   MC:   Percentiles:   Mean:   Short Form   Raw Score: 0/5  Percentiles:10% tile            READING  Match case and scripts     Number matching     Picture-Word Matching     Oral Word Reading     Oral Sentence Reading     Oral Sentence Comp     Sentence/Paragraph     Comprehension    Raw Score: 7  Percentiles: 40%tile  Raw Score:   Percentiles:   Raw Score: 10  Percentiles: 100%tile  Raw  Score:   Percentiles:   Raw Score:   Percentiles:   Raw Score:   Percentiles:   Raw Score:   Percentiles:   Raw Score:   Percentiles:     Raw Score: 8  Percentiles: 100%tile  Raw Score: 8/12  Percentiles: 10  Raw Score: 7  Percentiles: 20%tile    WRITING  Form     Letter Choice     Motor Facility     Primer Words     Regular Phonics     Common Irregular Words     Written Picture Naming     Narrative Writing    Raw Score:   Percentiles:   Raw Score:   Percentiles:   Raw Score:   Percentiles:   Raw Score:   Percentiles:   Raw Score:   Percentiles:   Raw Score:   Percentiles:   Raw Score:   Percentiles:   Raw Score:   Percentiles:           IE: 2023 RE: 2023   Rating Scale Profile of Speech Characteristics 1-7 scale 1-7 scale   Articulatory Agility-phoneme and syllable level  2-3/7   Phrase Length: longest word runs 2/7 3/7   Grammatical Form: Variety and use of morphemes    Melodic Line (Prosody) -   Paraphasia in Running Speech    Word Finding Relative to Fluency 1/7 3/7   Sentence Repetition    Auditory Comprehension  3     Aphasia Severity Ratin/5-   Conversations about familiar subjects is possible with help from the listener.  There are frequent failures to convey the idea, but the patient shares the burden of communication.     Provided a direct model and visual cue  Sound Initial Medial Final    p + park      b -  way/bay     t -  lung/tongue     d + day     k -  bambi/kiss     g -  olf/golf     m -  eat/meat     n - durse/nurse     ng      f - mehreen/fam     v -  ses/vest     th-vl +  think     th-v -  leese/these     s + sew     z -  mina/zoo     sh +  shirt     ch +  chair     dg +  Bean     l -  Yuli/rollins     r/ir/er +  rake     w -  dzatch/watch     bl +  blow     br      dr      fr      gl      gr      kr      kw      nt      pl      pr      sl      sp      st      sw      tr

## 2023-11-14 NOTE — Clinical Note
Dear DR. Haywood,    Thank you for this referral. Please review the attached notes and plan of care for your approval.  Please contact our department with any questions.     Sincerely,     Barry Rosas OT  [address]  [address]  [place] [address]      By co-signing this Plan of Care (POC) you agree to the following:  I have reviewed the the Plan of Care established by the therapist within this document and certify that the services are skilled and medically necessary. I have reviewed the plan and recommend that these services continue to meet the goals stated in this document.    PHYSICIAN SIGNATURE: __________________________________     DATE: ___________________  TIME: _____________           Occupational Therapy Plan of Care 23   Effective from: 2023  Effective to: 2024    Action Required  Plan ID: [id number]           Participants as of 2024    Name Type Comments Contact Info    Barry Rosas OT Occupational Therapist      Liv Haywood MD Referring Provider  [phone]    Chris Wang MD PCP - General  [phone]      Last Progress Notes Note     Author: Barry Rosas OT Status: Signed Last edited: 2024  3:51 PM       Occupational Therapy Progress Note- Sending historic OT plan of care as physician information has been updated. Please review and sign.    [facility] Fax: [phone]    OT RE-EVALUATION FOR OUTPATIENT THERAPY    Patient: Jennifer Sams   MRN: [id number]  : 1945 79 y.o.    Referring Physician: Dr. Chris Wang  Date of Visit: 2023    New Certification Dates: 23 through 24    Recommended Frequency & Duration:  2 times/week for up to 3 months         Diagnosis:   1. Lack of coordination    2. Acute ischemic left MCA stroke (CMS/HCC)        Chief Complaints:  No chief complaint on file.      Precautions:        TODAY'S VISIT:    Time In Session:  Start Time: 1405  Stop Time: 1500  Time  Calculation (min): 55 min   General Information - 11/14/23 1419        Session Details    Document Type daily treatment        General Information    Onset of Illness/Injury or Date of Surgery 06/26/23     Referring Physician Dr. Haywood     History of present illness/functional impairment The patient is a 78-year-old  female with a history of AAA, heart block s/p cardiac pacemaker, glaucoma, hyperlipidemia, coronary artery disease, NSTEMI, who presented to [facility] on June 26 after she has not been seen by her friends for a few days.  Patient lives alone and when she was found by EMS she was confused and combative.  In the emergency department she was aphasic with right-sided weakness and hypertensive to the 190 systolic.  CT scan of the head revealed an acute infarct in left MCA with mild bleeding.  Echo showed an EF of 43% and a bubble study was negative.  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  Later this was transitioned to apixaban.  She was continued on statin for secondary prevention.  She required virtual shivani while in the hospital but this was not effective.  She was evaluated by speech therapy and cleared for a soft and bite-size diet with moderately thick liquids.'     Patient/Family/Caregiver Comments/Observations Patient presents to OT session and is agreeable to re-eval         Services    Do You Speak a Language Other Than English at Home? no                  Pain/Vitals - 11/14/23 1419        Pain Assessment    Currently in pain No/Denies                OT - 11/14/23 1419        Occupational Therapy Encounter Type Details    Occupational Therapy Specialty Traditional Neuro Program OT        OT Frequency and Duration    Frequency of treatment 2 times/week     OT Duration 3 months     OT Cert From 11/14/23     OT Cert To 02/12/24     Date OT POC was sent to provider 11/14/23      Signed OT Plan of Care received?  Yes                Assessment - 11/14/23 4224        Assessment    Plan of Care reviewed and patient/family in agreement Yes     Comments Patient is in agreeement     System Pathology/Pathophysiology Noted neuromuscular     Functional Limitations in Following Categories self-care;home management;community/leisure     Problem List: Occupational Therapy coordination impaired;impaired cognition;decreased flexibility;sensation decreased     Rehab Potential/Prognosis: Occupational Therapy good, to achieve stated therapy goals     Clinical Assessment Patient seen in OP OT for re-evaluation and arrives unaccompanied today. Patient was seen for initial evaluation on 8/16/23 and presented with significant impairments in functional use of RUE including deficits in sensation, strength, fine and gross motor control. Patient has been seen for 17 OT visits including today. Objective metrics assessed today as follow: Box and Block score at left hand is 53 blocks today (IE: 30 blocks, norms: 63.6 blocks) and at right hand is 28 blocks (IE: 4, norms: 65); 9 hole peg test score at 24.06 sec (norms: 24.11 sec); right hand unable to complete test and modified to only include removing pegs, with improvement to 16.65 seconds todays as compared to 48.6 seconds at first measurement. AROM at right hand has improved per distance from MF to DPC, measured at 1.5 cm today as compared to 6.5 cm at IE. Right hand  strength has improved to 5.3 lbs as compared to 0 lbs at IE, and left hand  strength has improved to 36 lbs as compared to 30 lbs at IE. Subjectively patient reports improvement in independence at home with ADLs and with increase in bilateral coordination for performing routine task including tying her shoes, drying off after showering, and performing dressing. Patient continues to experience neuromuscular deficits at RUE impairing performance with ADL, IADL and leisure occupational performance  at this time. Recommend exension of OT POC 2x/week for 3 months and patient is agreeable to plan.     Plan and Recommendations Extend OT POC for improved RUE ROM, strength, coordination, and functional engagement in ADL/IADL/leisure     Planned Services CPT 27166 Neuromuscular Reeducation;CPT 64437 Self-care/Home management training;CPT 13609 Therapeutic activities;CPT 39883 Hot/Cold Packs therapy                 OBJECTIVE MEASUREMENTS/DATA:    BADL/IADL    BADL/IADL - 11/14/23 1420        BADL Interventions Assessment    Upper Body Dressing Modified independence     Lower Body Dressing Modified independence     Lower body dressing comments Intermittent success with tying shoes; taking 10 minutes to get dressing     Bathing Minimum assist (75% patient effort)     Bathing comments Patient reports attempting to wash her hair but limited by impaired by impaired sensation at right side of head     Toileting Independent     Grooming Minimum assist (75% patient effort)     Grooming comments Usually automatic toothbrush. Able to brush hair but difficulty with blowing hair     Eating Minimum assist (75% patient effort)     Eating comments diet is soft food only; requires assist with cutting into pieces. Incorporating RUE minimally        IADL/Home Interventions Assessment    Home management/maintenance Minimum assist (75% patient effort)     Home management/maintenance comments Difficulty with hand washing dishes, using LUE for dusting, gets assistance with vacuuming due to heart condition.     Meal prep / clean up Maximum assist (25% patient effort)     Meal prep / clean up comments Able to reheat in microwave but not preparing food at this time, hot or cold               Gross and Fine Motor     Gross and Fine Motor - 11/14/23 1420        Hand  Strength Testing    Left Hand, Setting 2 34/36/38; Average = 36 lbs     Right Hand, Setting 2 6/5/5; Average = 5.3 lbs               Outcome Measures    Outcome Measures -  11/14/23 1420        Objective Outcome Measures    RIGHT hand: Box and Blocks Assessment 53     LEFT hand: Box and Blocks Assessment 28     9 Hole Peg Test - RIGHT HAND TIME 16.65   Removing only using lateral pinch    9 Hole Peg Test - LEFT HAND TIME 24.06        Other Outcome Measures Used/Comments    Outcome measure used: Right MF to DPC = 1.5 cm                 ROM and MMT        9/14/2023    18:07 11/28/2023    16:29 1/4/2024    19:06 1/11/2024    19:18 1/18/2024    17:11   OT Cervical/Lumbar/Other ROM Measurements   Other: Girth Measurement/Comments   Right IF: P1-5.9 cm, PIP- 6cm, P2- 5.3 cm, DIP- 5.1 cm // Left IF: P1-5.6 cm, PIP-5.5 cm, P2- 4.9 cm, DIP-4.9 cm // Right MCP [place]- 17.5 cm Right IF: P1-5.8 cm, PIP- 5.9cm, P2- 5.1 cm, DIP- 4.9 // Right MCP [place]- 17.5 cm Right IF: P1-5.9 cm, PIP- 5.9cm, P2- 5.2 cm, DIP- 4.9 // Right MCP [place]- 17.5 cm          .5 cm MF to DPC   OT UE MMT   Right Shoulder Flexion (4+/5) good plus       Left Shoulder Flexion (4+/5) good plus (4+/5) good plus      Right Shoulder Extension (4+/5) good plus       Left Shoulder Extension (4+/5) good plus (4+/5) good plus      Right Shoulder ADD (4+/5) good plus       Left Shoulder ADD (4+/5) good plus (4+/5) good plus      Right Shoulder ABD (4+/5) good plus       Left Shoulder ABD (4+/5) good plus (4+/5) good plus      Right Shoulder IR (3+/5) fair plus       Left Shoulder IR (4+/5) good plus (4+/5) good plus      Right Shoulder ER (4/5) good       Left Shoulder ER (4+/5) good plus (4+/5) good plus      Right Elbow Flexion (4+/5) good plus       Left Elbow Flexion (4+/5) good plus (4+/5) good plus      Right Elbow Extension (4+/5) good plus       Left Elbow Extension (4+/5) good plus (4+/5) good plus      Right Forearm Supination (4/5) good       Left Forearm Supination (4+/5) good plus (4+/5) good plus      Right Forearm Pronation (4/5) good       Left Forearm Pronation (4+/5) good plus (4+/5) good plus      Right Wrist Flexion  (3+/5) fair plus       Left Wrist Flexion (4/5) good (4/5) good      Right Wrist Extension (3+/5) fair plus       Left Wrist Extension (4/5) good (4/5) good      Right Wrist UD (2/5) poor       Left Wrist UD (4/5) good (4/5) good      Right Wrist RD (2/5) poor       Left Wrist RD (4/5) good (4/5) good        Outcome Measures        10/26/2023    14:01 11/2/2023    13:20 11/14/2023    14:20 12/14/2023    17:12 1/18/2024    17:11 2/6/2024    16:21   OT OBJECTIVE Outcome Measures   9 Hole Peg Test - Right Hand 48.6       Removing pegs only 30.88       peg removal only 16.65       Removing only using lateral pinch 180       3 pegs placed 180       14.82- removal only 180       Removal only 32.46   9 Hole Peg Test - Left Hand   24.06  25.84       removing only    9 Hole Peg Test - Comments     Patient able to place 5 pegs in pegboard in 3 minutes at right hand Patient placed 3 pegs into board today in 3 minutes   Right Hand Box and Blocks   53 24 23 23   Left Hand Box and Blocks   28      Other Outcome Measure   Right MF to DPC = 1.5 cm          Today's Treatment:      OT NEURO FLOW SHEET    EXERCISES  Initial Evaluation  Progress Note 1  Progress Note 2  Re-evaluation CURRENT SESSION  8/16/2023  9/14/2023  10/23/23  11/14/23 TIME   NEURO RE-ED  CPT 37304 TOTAL TIME FOR SESSION 0-7 Minutes   AAROM/AROM PROM and AROM composite flexion at fingers of right hand    AROM tip pinch IF to thumb for improved motor coordination    Strengthening      Strength       Gross Motor Control     Fine Motor Control     Sitting Jennifer/Balance     Standing Jennifer/Balance     Sensory re-education      Retraining     THERE ACT  CPT 26357 TOTAL TIME FOR SESSION 38-52 Minutes   Pain, Vitals, Meds, Etc. Vitals taken, pain assessed/monitored, Rest breaks provided for frustration management    Assessment Patient was 100% full participant in today's re-evaluation with objective metrics assessed as follow: Box and Block, 9 hole peg, and   strength    HEP 11/2/23: Review pinch strengthening positions today for improved carry over    10/26/23: Added soft theraputty for right hand pinch (lateral pinch, tip pinch, 3 jaw patti); patient expressed good understnading    Discussed carry over with use of SF splint    9/28/23: Laterality exercise added to HEP; patient and caregiver expressed good understanding    Right hand stretching    9/21/2023:   -Educated on use of rice/bean sensory bin, massage, sensory re-education with deep pressure and textures. Issued tubigrip for RUE sensory re-education and advised to wear 1x per day. Continued to discuss edema management.     Functional Mobility     Transfers     THERE EX  CPT 28551 TOTAL TIME FOR SESSION 0-7 Minutes   PROM     Activity Tolerance     SELF-CARE  CPT 68716 TOTAL TIME FOR SESSION 8-22 Minutes   Pt Education/Safety     ADL Training     IADL Training Patient participated in dressing set-up task with  from home today; she demonstrates ability to use RUE as functional assist and remove clothing from  without assistance.     MODALITIES  CPT 83600 TOTAL TIME FOR SESSION Not performed   Ice/Heat     ATTENDED E-STIM  CPT 14395 TOTAL TIME FOR SESSION Not performed   Attended E-Stim     SPLINTING  CPT 98339 TOTAL TIME FOR SESSION Not performed   Fabrication/Check Out     Fit     Training     GROUP  CPT 59591 TOTAL TIME FOR SESSION Not performed             Normative Range for Female 75+ Years of Age       Right Hand   Left Hand      Initial Evaluation Progress Note 1 Norm Initial Evaluation Progress Note 1 Norm    Strength 0 lbs 0 lbs 42.6 lbs 30 lbs 30 lbs 37.6 lbs   Lateral Pinch N/T 2 lbs 12.6 lbs N/T 14 lbs 11.4 lbs   Three Jaw Patti N/T 2 lbs (assist for position) 12 lbs N/T 11 lbs 11.5 lbs   Tip Pinch N/T N/T 9.6 lbs N/T 9 lbs 9.3 lbs   Box and Block 4 blocks 22 blocks 65 blocks 50 blocks 56 blocks 63.6 blocks   Nine Hole Peg (71+ age) Unable to complete Unable to complete 22.49 seconds  N/T 24.55 seconds 24.11 seconds       Goals:     Goals      OT Neuro/Deconditioning Goals        Short Term Goals Time Frame Result Comment/Progress   Assess gross motor control via Box and Block Assessment  4 weeks Met 9/14: R: 22; L: 56   Assess fine motor control via 9 hole peg test 4 weeks Met  9/14: R: Unable; L: 24.55   Improved automatic fine motor use at right hand with routine tasks (e.g. eating, writing, brushing teeth) to 25% of baseline  4 weeks Ongoing     Explore adaptive visual strategies to encourage looking to right side 4 weeks Met    Incorporate right hand in at least 2 self-care activities 4 weeks Met    Carry over HEP at least 3 times per week  4 weeks Met     Score at least 32 on Box and block test 4 weeks Ongoing 2/7/24: 23 blocks   Increase right hand  strength to 8 lbs 4 weeks Met       Long Term Goals Time Frame Result Comment/Progress   Improve gross motor control as evidenced by at least 10 block improved at RUE with Box and Block Assessment for improved ability to perform ADLs and IADLs  12 weeks Met     Patient will complete 9 hole peg test in under 3 minutes 12 weeks Ongoing    Decrease distance from Right MF to DPC to 2 cm for improved functional grasp at right hand 12 weeks Met     Improved automatic fine motor use at right hand with routine tasks (e.g. eating, writing, brushing teeth) to 75% of baseline 12 weeks Ongoing     Increase right hand  strength to at least 15 lbs 12 weeks Ongoing    Increase Box and Block score to at least 40 12 weeks Ongoing 2/7/24: 23 blocks                    This 79 y.o. year old female presents to OT with above stated diagnosis. Occupational Therapy evaluation reveals coordination impaired, impaired cognition, decreased flexibility, sensation decreased resulting in self-care, home management, community/leisure limitations. Jennifer Sams will benefit from skilled OT services to address limitation, work towards rehab and patient goals and  maximize PLOF of chosen ADLs.    Planned Services: The patients treatment will include CPT 47260 Neuromuscular Reeducation, CPT 72176 Self-care/Home management training, CPT 29717 Therapeutic activities, CPT 93163 Hot/Cold Packs therapy, .    Barry Rosas, OT

## 2023-11-14 NOTE — OP OT TREATMENT LOG
OT NEURO FLOW SHEET    EXERCISES  Initial Evaluation  Progress Note 1  Progress Note 2  Re-evaluation CURRENT SESSION  8/16/2023  9/14/2023  10/23/23  11/14/23 TIME   NEURO RE-ED  CPT 36551 TOTAL TIME FOR SESSION 0-7 Minutes   AAROM/AROM PROM and AROM composite flexion at fingers of right hand    AROM tip pinch IF to thumb for improved motor coordination    Strengthening      Strength       Gross Motor Control     Fine Motor Control     Sitting Jennifer/Balance     Standing Jennifer/Balance     Sensory re-education      Retraining     THERE ACT  CPT 97900 TOTAL TIME FOR SESSION 38-52 Minutes   Pain, Vitals, Meds, Etc. Vitals taken, pain assessed/monitored, Rest breaks provided for frustration management    Assessment Patient was 100% full participant in today's re-evaluation with objective metrics assessed as follow: Box and Block, 9 hole peg, and  strength    HEP 11/2/23: Review pinch strengthening positions today for improved carry over    10/26/23: Added soft theraputty for right hand pinch (lateral pinch, tip pinch, 3 jaw patti); patient expressed good understnading    Discussed carry over with use of SF splint    9/28/23: Laterality exercise added to HEP; patient and caregiver expressed good understanding    Right hand stretching    9/21/2023:   -Educated on use of rice/bean sensory bin, massage, sensory re-education with deep pressure and textures. Issued tubigrip for RUE sensory re-education and advised to wear 1x per day. Continued to discuss edema management.     Functional Mobility     Transfers     THERE EX  CPT 94511 TOTAL TIME FOR SESSION 0-7 Minutes   PROM     Activity Tolerance     SELF-CARE  CPT 31907 TOTAL TIME FOR SESSION 8-22 Minutes   Pt Education/Safety     ADL Training     IADL Training Patient participated in dressing set-up task with  from home today; she demonstrates ability to use RUE as functional assist and remove clothing from  without assistance.     MODALITIES  CPT 24642  TOTAL TIME FOR SESSION Not performed   Ice/Heat     ATTENDED E-STIM  CPT 00934 TOTAL TIME FOR SESSION Not performed   Attended E-Stim     SPLINTING  CPT 55341 TOTAL TIME FOR SESSION Not performed   Fabrication/Check Out     Fit     Training     GROUP  CPT 80522 TOTAL TIME FOR SESSION Not performed             Normative Range for Female 75+ Years of Age       Right Hand   Left Hand      Initial Evaluation Progress Note 1 Norm Initial Evaluation Progress Note 1 Norm    Strength 0 lbs 0 lbs 42.6 lbs 30 lbs 30 lbs 37.6 lbs   Lateral Pinch N/T 2 lbs 12.6 lbs N/T 14 lbs 11.4 lbs   Three Jaw Yonatan N/T 2 lbs (assist for position) 12 lbs N/T 11 lbs 11.5 lbs   Tip Pinch N/T N/T 9.6 lbs N/T 9 lbs 9.3 lbs   Box and Block 4 blocks 22 blocks 65 blocks 50 blocks 56 blocks 63.6 blocks   Nine Hole Peg (71+ age) Unable to complete Unable to complete 22.49 seconds N/T 24.55 seconds 24.11 seconds

## 2023-11-14 NOTE — Clinical Note
Dear DR. Haywood,    Thank you for this referral. Please review the attached notes and plan of care for your approval.  Please contact our department with any questions.     Sincerely,     Barry Rosas OT  [address]  [address]  [place] [address]  Fax  [phone]      By co-signing this Plan of Care (POC) you agree to the following:  I have reviewed the the Plan of Care established by the therapist within this document and certify that the services are skilled and medically necessary. I have reviewed the plan and recommend that these services continue to meet the goals stated in this document.    PHYSICIAN SIGNATURE: __________________________________     DATE: ___________________  TIME: _____________           Occupational Therapy Plan of Care 24   Effective from: 2024  Effective to: 3/5/2024    Plan ID: [id number]            Participants as of Finalize on 2024    Name Type Comments Contact Info    Barry Rosas OT Occupational Therapist      Chris Wang MD Referring Physician  [phone]       Last Progress Notes Note     Author: Barry Rosas OT Status: Signed Last edited: 2024  2:00 PM       Duplicate note for purposes of resending historical OT POC.    Occupational Therapy Progress Note    [facility] Fax: [phone]    OT RE-EVALUATION FOR OUTPATIENT THERAPY    Patient: Jennifer Sams   MRN: [id number]  : 1945 79 y.o.    Referring Physician: Liv Haywood, *  Date of Visit: 2023    New Certification Dates: 23 through 24    Recommended Frequency & Duration:  2 times/week for up to 3 months         Diagnosis:   1. Lack of coordination    2. Acute ischemic left MCA stroke (CMS/HCC)        Chief Complaints:  No chief complaint on file.      Precautions:        TODAY'S VISIT:    Time In Session:  Start Time: 1405  Stop Time: 1500  Time Calculation (min): 55 min   General Information - 23 1419          Session  Details    Document Type daily treatment        General Information    Onset of Illness/Injury or Date of Surgery 06/26/23     Referring Physician Dr. Haywood     History of present illness/functional impairment The patient is a 78-year-old  female with a history of AAA, heart block s/p cardiac pacemaker, glaucoma, hyperlipidemia, coronary artery disease, NSTEMI, who presented to [facility] on June 26 after she has not been seen by her friends for a few days.  Patient lives alone and when she was found by EMS she was confused and combative.  In the emergency department she was aphasic with right-sided weakness and hypertensive to the 190 systolic.  CT scan of the head revealed an acute infarct in left MCA with mild bleeding.  Echo showed an EF of 43% and a bubble study was negative.  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  Later this was transitioned to apixaban.  She was continued on statin for secondary prevention.  She required virtual shivani while in the hospital but this was not effective.  She was evaluated by speech therapy and cleared for a soft and bite-size diet with moderately thick liquids.'     Patient/Family/Caregiver Comments/Observations Patient presents to OT session and is agreeable to re-eval         Services    Do You Speak a Language Other Than English at Home? no                      Pain/Vitals - 11/14/23 1419          Pain Assessment    Currently in pain No/Denies                    OT - 11/14/23 1419          Occupational Therapy Encounter Type Details    Occupational Therapy Specialty Traditional Neuro Program OT        OT Frequency and Duration    Frequency of treatment 2 times/week     OT Duration 3 months     OT Cert From 11/14/23     OT Cert To 02/12/24     Date OT POC was sent to provider 11/14/23     Signed OT Plan of Care received?  Yes                    Assessment -  11/14/23 1416          Assessment    Plan of Care reviewed and patient/family in agreement Yes     Comments Patient is in agreeement     System Pathology/Pathophysiology Noted neuromuscular     Functional Limitations in Following Categories self-care;home management;community/leisure     Problem List: Occupational Therapy coordination impaired;impaired cognition;decreased flexibility;sensation decreased     Rehab Potential/Prognosis: Occupational Therapy good, to achieve stated therapy goals     Clinical Assessment Patient seen in OP OT for re-evaluation and arrives unaccompanied today. Patient was seen for initial evaluation on 8/16/23 and presented with significant impairments in functional use of RUE including deficits in sensation, strength, fine and gross motor control. Patient has been seen for 17 OT visits including today. Objective metrics assessed today as follow: Box and Block score at left hand is 53 blocks today (IE: 30 blocks, norms: 63.6 blocks) and at right hand is 28 blocks (IE: 4, norms: 65); 9 hole peg test score at 24.06 sec (norms: 24.11 sec); right hand unable to complete test and modified to only include removing pegs, with improvement to 16.65 seconds todays as compared to 48.6 seconds at first measurement. AROM at right hand has improved per distance from MF to DPC, measured at 1.5 cm today as compared to 6.5 cm at IE. Right hand  strength has improved to 5.3 lbs as compared to 0 lbs at IE, and left hand  strength has improved to 36 lbs as compared to 30 lbs at IE. Subjectively patient reports improvement in independence at home with ADLs and with increase in bilateral coordination for performing routine task including tying her shoes, drying off after showering, and performing dressing. Patient continues to experience neuromuscular deficits at RUE impairing performance with ADL, IADL and leisure occupational performance at this time. Recommend exension of OT POC 2x/week for 3 months  and patient is agreeable to plan.     Plan and Recommendations Extend OT POC for improved RUE ROM, strength, coordination, and functional engagement in ADL/IADL/leisure     Planned Services CPT 87860 Neuromuscular Reeducation;CPT 12253 Self-care/Home management training;CPT 18445 Therapeutic activities;CPT 13421 Hot/Cold Packs therapy                     OBJECTIVE MEASUREMENTS/DATA:    BADL/IADL    BADL/IADL - 11/14/23 1420          BADL Interventions Assessment    Upper Body Dressing Modified independence     Lower Body Dressing Modified independence     Lower body dressing comments Intermittent success with tying shoes; taking 10 minutes to get dressing     Bathing Minimum assist (75% patient effort)     Bathing comments Patient reports attempting to wash her hair but limited by impaired by impaired sensation at right side of head     Toileting Independent     Grooming Minimum assist (75% patient effort)     Grooming comments Usually automatic toothbrush. Able to brush hair but difficulty with blowing hair     Eating Minimum assist (75% patient effort)     Eating comments diet is soft food only; requires assist with cutting into pieces. Incorporating RUE minimally        IADL/Home Interventions Assessment    Home management/maintenance Minimum assist (75% patient effort)     Home management/maintenance comments Difficulty with hand washing dishes, using LUE for dusting, gets assistance with vacuuming due to heart condition.     Meal prep / clean up Maximum assist (25% patient effort)     Meal prep / clean up comments Able to reheat in microwave but not preparing food at this time, hot or cold                   Gross and Fine Motor     Gross and Fine Motor - 11/14/23 1420          Hand  Strength Testing    Left Hand, Setting 2 34/36/38; Average = 36 lbs     Right Hand, Setting 2 6/5/5; Average = 5.3 lbs                   Outcome Measures    Outcome Measures - 11/14/23 1420          Objective Outcome Measures     RIGHT hand: Box and Blocks Assessment 53     LEFT hand: Box and Blocks Assessment 28     9 Hole Peg Test - RIGHT HAND TIME 16.65   Removing only using lateral pinch    9 Hole Peg Test - LEFT HAND TIME 24.06        Other Outcome Measures Used/Comments    Outcome measure used: Right  to [facility] = 1.5 cm                     ROM and MMT          11/28/2023    16:29 1/4/2024    19:06 1/11/2024    19:18 1/18/2024    17:11 3/26/2024    14:00 4/23/2024    17:00   OT UE ROM Measurements   AROM: Right Shoulder Flexion     150 degrees 135 degrees   AROM: Right Shoulder ABD     135 degrees       unable to move through full sagittal pain 91 degrees       limited by pain   AROM: Right Shoulder IR     20 degrees       hard end feel and pain --        WFL   AROM: Right Shoulder ER     --        WFL    AROM: Right Elbow Flex/Ext     WFL    AROM: Right Wrist Flexion     60 degrees    AROM: Right Wrist Extension     55 degrees    AROM: Right Wrist UD     50 degrees    AROM: Right Wrist RD     20 degrees    OT Cervical/Lumbar/Other ROM Measurements   Other: Girth Measurement/Comments  Right IF: P1-5.9 cm, PIP- 6cm, P2- 5.3 cm, DIP- 5.1 cm // Left IF: P1-5.6 cm, PIP-5.5 cm, P2- 4.9 cm, DIP-4.9 cm // Right MCP [place]- 17.5 cm Right IF: P1-5.8 cm, PIP- 5.9cm, P2- 5.1 cm, DIP- 4.9 // Right MCP [place]- 17.5 cm Right IF: P1-5.9 cm, PIP- 5.9cm, P2- 5.2 cm, DIP- 4.9 // Right MCP [place]- 17.5 cm          .5 cm MF to DPC     OT UE MMT   Left Shoulder Flexion (4+/5) good plus        Left Shoulder Extension (4+/5) good plus        Left Shoulder ADD (4+/5) good plus        Left Shoulder ABD (4+/5) good plus        Left Shoulder IR (4+/5) good plus        Left Shoulder ER (4+/5) good plus        Left Elbow Flexion (4+/5) good plus        Left Elbow Extension (4+/5) good plus        Left Forearm Supination (4+/5) good plus        Left Forearm Pronation (4+/5) good plus        Left Wrist Flexion (4/5) good        Left Wrist Extension (4/5) good         Left Wrist UD (4/5) good        Left Wrist RD (4/5) good          Outcome Measures          12/14/2023    17:12 1/18/2024    17:11 2/6/2024    16:21 3/26/2024    14:10 4/23/2024    17:12 4/30/2024    21:59   OT OBJECTIVE Outcome Measures   9 Hole Peg Test - Right Hand 180       3 pegs placed 180       14.82- removal only 180       Removal only 32.46   --        Unable to complete   9 Hole Peg Test - Left Hand  25.84       removing only       9 Hole Peg Test - Comments  Patient able to place 5 pegs in pegboard in 3 minutes at right hand Patient placed 3 pegs into board today in 3 minutes      Right Hand Box and Blocks 24 23 23 15 21    Left Hand Box and Blocks    48         Today's Treatment:      OT NEURO FLOW SHEET    EXERCISES  Initial Evaluation  Progress Note 1  Progress Note 2  Re-evaluation CURRENT SESSION  8/16/2023  9/14/2023  10/23/23  11/14/23 TIME   NEURO RE-ED  CPT 33619 TOTAL TIME FOR SESSION 0-7 Minutes   AAROM/AROM PROM and AROM composite flexion at fingers of right hand    AROM tip pinch IF to thumb for improved motor coordination    Strengthening      Strength       Gross Motor Control     Fine Motor Control     Sitting Jennifer/Balance     Standing Jennifer/Balance     Sensory re-education      Retraining     THERE ACT  CPT 63344 TOTAL TIME FOR SESSION 38-52 Minutes   Pain, Vitals, Meds, Etc. Vitals taken, pain assessed/monitored, Rest breaks provided for frustration management    Assessment Patient was 100% full participant in today's re-evaluation with objective metrics assessed as follow: Box and Block, 9 hole peg, and  strength    HEP 11/2/23: Review pinch strengthening positions today for improved carry over    10/26/23: Added soft theraputty for right hand pinch (lateral pinch, tip pinch, 3 jaw patti); patient expressed good understnading    Discussed carry over with use of SF splint    9/28/23: Laterality exercise added to HEP; patient and caregiver expressed good understanding    Right  hand stretching    9/21/2023:   -Educated on use of rice/bean sensory bin, massage, sensory re-education with deep pressure and textures. Issued tubigrip for RUE sensory re-education and advised to wear 1x per day. Continued to discuss edema management.     Functional Mobility     Transfers     THERE EX  CPT 14647 TOTAL TIME FOR SESSION 0-7 Minutes   PROM     Activity Tolerance     SELF-CARE  CPT 22057 TOTAL TIME FOR SESSION 8-22 Minutes   Pt Education/Safety     ADL Training     IADL Training Patient participated in dressing set-up task with  from home today; she demonstrates ability to use RUE as functional assist and remove clothing from  without assistance.     MODALITIES  CPT 56270 TOTAL TIME FOR SESSION Not performed   Ice/Heat     ATTENDED E-STIM  CPT 75286 TOTAL TIME FOR SESSION Not performed   Attended E-Stim     SPLINTING  CPT 61648 TOTAL TIME FOR SESSION Not performed   Fabrication/Check Out     Fit     Training     GROUP  CPT 26661 TOTAL TIME FOR SESSION Not performed             Normative Range for Female 75+ Years of Age       Right Hand   Left Hand      Initial Evaluation Progress Note 1 Norm Initial Evaluation Progress Note 1 Norm    Strength 0 lbs 0 lbs 42.6 lbs 30 lbs 30 lbs 37.6 lbs   Lateral Pinch N/T 2 lbs 12.6 lbs N/T 14 lbs 11.4 lbs   Three Jaw Yonatan N/T 2 lbs (assist for position) 12 lbs N/T 11 lbs 11.5 lbs   Tip Pinch N/T N/T 9.6 lbs N/T 9 lbs 9.3 lbs   Box and Block 4 blocks 22 blocks 65 blocks 50 blocks 56 blocks 63.6 blocks   Nine Hole Peg (71+ age) Unable to complete Unable to complete 22.49 seconds N/T 24.55 seconds 24.11 seconds       Goals:     Goals        OT Neuro/Deconditioning Goals        Short Term Goals Time Frame Result Comment/Progress   Assess gross motor control via Box and Block Assessment  4 weeks Met 9/14: R: 22; L: 56   Assess fine motor control via 9 hole peg test 4 weeks Met  9/14: R: Unable; L: 24.55   Improved automatic fine motor use at right hand  with routine tasks (e.g. eating, writing, brushing teeth) to 25% of baseline  4 weeks Ongoing     Explore adaptive visual strategies to encourage looking to right side 4 weeks Met    Incorporate right hand in at least 2 self-care activities 4 weeks Met    Carry over HEP at least 3 times per week  4 weeks Met     Score at least 32 on Box and block test 4 weeks Ongoing 2/7/24: 23 blocks  3/26/24: 15 blocks  4/23/24: 21 blocks   Increase right hand  strength to 8 lbs 4 weeks Met    Engage in bilateral fine motor coordination task to open food or self-care containers with supervision during 4/6 trials for increased independence with meal preparation.  4 weeks New goal    Complete at least 4 activities from RUE modified constraint induced movement therapy activity log across 3 weeks with assistance as needed. 4 weeks Met    Engage in simulated or real life food cutting activity using bilateral hands with no more than minimal assistance x 2 trials.  4 weeks Met    Perform RUE scapular internal rotation through at least 45 degrees with no more than 1 point increase in pain in order to promote joint mobilization and reduce scapular adhesion.  4 weeks New goal RE 2/26/24: 20 degrees, hard end feel and pain   Complete right hand three jaw patti or tip pinch activity to retrieve 1x1 inch block and place in target area with short opponens splint donned or manual inhibition of excessive opposition x 10 repetitions across 2 trials for improved coordination 4 weeks New goal       Long Term Goals Time Frame Result Comment/Progress   Improve gross motor control as evidenced by at least 10 block improved at RUE with Box and Block Assessment for improved ability to perform ADLs and IADLs  12 weeks Met     Patient will complete 9 hole peg test in under 3 minutes 12 weeks Ongoing    Decrease distance from Right MF to DPC to 2 cm for improved functional grasp at right hand 12 weeks Met     Improved automatic fine motor use at right  hand with routine tasks (e.g. eating, writing, brushing teeth) to 75% of baseline 12 weeks Ongoing     Increase right hand  strength to at least 15 lbs 12 weeks Ongoing 3/26/24: 6 lbs  4/23/24: 11 lbs   Increase Box and Block score to at least 40 12 weeks Ongoing 2/7/24: 23 blocks  3/26/24: 15 blocks                      This 79 y.o. year old female presents to OT with above stated diagnosis. Occupational Therapy evaluation reveals coordination impaired, impaired cognition, decreased flexibility, sensation decreased resulting in self-care, home management, community/leisure limitations. Jennifer Sams will benefit from skilled OT services to address limitation, work towards rehab and patient goals and maximize PLOF of chosen ADLs.    Planned Services: The patients treatment will include CPT 08964 Neuromuscular Reeducation, CPT 39940 Self-care/Home management training, CPT 43966 Therapeutic activities, CPT 06997 Hot/Cold Packs therapy, .    Barry Rosas OT         Current Participants as of 5/1/2024    Name Type Comments Contact Info    Barry Rosas OT Occupational Therapist      Signature pending    Chris Wang MD Referring Physician  [phone]    Signature pending

## 2023-11-15 NOTE — PROGRESS NOTES
Occupational Therapy Progress Note    [facility] Fax: [phone]    OT RE-EVALUATION FOR OUTPATIENT THERAPY    Patient: Jennifer Sams   MRN: [id number]  : 1945 78 y.o.    Referring Physician: Liv Haywood, *  Date of Visit: 2023    New Certification Dates: 23 through 24    Recommended Frequency & Duration:  2 times/week for up to 3 months         Diagnosis:   1. Lack of coordination    2. Acute ischemic left MCA stroke (CMS/HCC)        Chief Complaints:  No chief complaint on file.      Precautions:        TODAY'S VISIT:    Time In Session:  Start Time: 1405  Stop Time: 1500  Time Calculation (min): 55 min   General Information - 23 1419        Session Details    Document Type daily treatment        General Information    Onset of Illness/Injury or Date of Surgery 23     Referring Physician Dr. Haywood     History of present illness/functional impairment The patient is a 78-year-old  female with a history of AAA, heart block s/p cardiac pacemaker, glaucoma, hyperlipidemia, coronary artery disease, NSTEMI, who presented to [facility] on  after she has not been seen by her friends for a few days.  Patient lives alone and when she was found by EMS she was confused and combative.  In the emergency department she was aphasic with right-sided weakness and hypertensive to the 190 systolic.  CT scan of the head revealed an acute infarct in left MCA with mild bleeding.  Echo showed an EF of 43% and a bubble study was negative.  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  Later this was transitioned to apixaban.  She was continued on statin for secondary prevention.  She required virtual shivani while in the hospital but this was not effective.  She was evaluated by speech therapy and cleared for a soft and bite-size diet with moderately thick liquids.'      Patient/Family/Caregiver Comments/Observations Patient presents to OT session and is agreeable to re-eval         Services    Do You Speak a Language Other Than English at Home? no                  Pain/Vitals - 11/14/23 1419        Pain Assessment    Currently in pain No/Denies                OT - 11/14/23 1419        Occupational Therapy Encounter Type Details    Occupational Therapy Specialty Traditional Neuro Program OT        OT Frequency and Duration    Frequency of treatment 2 times/week     OT Duration 3 months     OT Cert From 11/14/23     OT Cert To 02/12/24     Date OT POC was sent to provider 11/14/23     Signed OT Plan of Care received?  Yes                Assessment - 11/14/23 1419        Assessment    Plan of Care reviewed and patient/family in agreement Yes     Comments Patient is in agreeement     System Pathology/Pathophysiology Noted neuromuscular     Functional Limitations in Following Categories self-care;home management;community/leisure     Problem List: Occupational Therapy coordination impaired;impaired cognition;decreased flexibility;sensation decreased     Rehab Potential/Prognosis: Occupational Therapy good, to achieve stated therapy goals     Clinical Assessment Patient seen in OP OT for re-evaluation and arrives unaccompanied today. Patient was seen for initial evaluation on 8/16/23 and presented with significant impairments in functional use of RUE including deficits in sensation, strength, fine and gross motor control. Patient has been seen for 17 OT visits including today. Objective metrics assessed today as follow: Box and Block score at left hand is 53 blocks today (IE: 30 blocks, norms: 63.6 blocks) and at right hand is 28 blocks (IE: 4, norms: 65); 9 hole peg test score at 24.06 sec (norms: 24.11 sec); right hand unable to complete test and modified to only include removing pegs, with improvement to 16.65 seconds todays as compared to 48.6 seconds at first measurement.  AROM at right hand has improved per distance from MF to DPC, measured at 1.5 cm today as compared to 6.5 cm at IE. Right hand  strength has improved to 5.3 lbs as compared to 0 lbs at IE, and left hand  strength has improved to 36 lbs as compared to 30 lbs at IE. Subjectively patient reports improvement in independence at home with ADLs and with increase in bilateral coordination for performing routine task including tying her shoes, drying off after showering, and performing dressing. Patient continues to experience neuromuscular deficits at RUE impairing performance with ADL, IADL and leisure occupational performance at this time. Recommend exension of OT POC 2x/week for 3 months and patient is agreeable to plan.     Plan and Recommendations Extend OT POC for improved RUE ROM, strength, coordination, and functional engagement in ADL/IADL/leisure     Planned Services CPT 12146 Neuromuscular Reeducation;CPT 05042 Self-care/Home management training;CPT 35755 Therapeutic activities;CPT 66117 Hot/Cold Packs therapy                 OBJECTIVE MEASUREMENTS/DATA:    BADL/IADL    BADL/IADL - 11/14/23 1420        BADL Interventions Assessment    Upper Body Dressing Modified independence     Lower Body Dressing Modified independence     Lower body dressing comments Intermittent success with tying shoes; taking 10 minutes to get dressing     Bathing Minimum assist (75% patient effort)     Bathing comments Patient reports attempting to wash her hair but limited by impaired by impaired sensation at right side of head     Toileting Independent     Grooming Minimum assist (75% patient effort)     Grooming comments Usually automatic toothbrush. Able to brush hair but difficulty with blowing hair     Eating Minimum assist (75% patient effort)     Eating comments diet is soft food only; requires assist with cutting into pieces. Incorporating RUE minimally        IADL/Home Interventions Assessment    Home  management/maintenance Minimum assist (75% patient effort)     Home management/maintenance comments Difficulty with hand washing dishes, using LUE for dusting, gets assistance with vacuuming due to heart condition.     Meal prep / clean up Maximum assist (25% patient effort)     Meal prep / clean up comments Able to reheat in microwave but not preparing food at this time, hot or cold               Gross and Fine Motor     Gross and Fine Motor - 11/14/23 1420        Hand  Strength Testing    Left Hand, Setting 2 34/36/38; Average = 36 lbs     Right Hand, Setting 2 6/5/5; Average = 5.3 lbs               Outcome Measures    Outcome Measures - 11/14/23 1420        Objective Outcome Measures    RIGHT hand: Box and Blocks Assessment 53     LEFT hand: Box and Blocks Assessment 28     9 Hole Peg Test - RIGHT HAND TIME 16.65   Removing only using lateral pinch    9 Hole Peg Test - LEFT HAND TIME 24.06        Other Outcome Measures Used/Comments    Outcome measure used: Right MF to DPC = 1.5 cm                 ROM and MMT        9/14/2023    18:07   OT UE MMT   Right Shoulder Flexion (4+/5) good plus   Left Shoulder Flexion (4+/5) good plus   Right Shoulder Extension (4+/5) good plus   Left Shoulder Extension (4+/5) good plus   Right Shoulder ADD (4+/5) good plus   Left Shoulder ADD (4+/5) good plus   Right Shoulder ABD (4+/5) good plus   Left Shoulder ABD (4+/5) good plus   Right Shoulder IR (3+/5) fair plus   Left Shoulder IR (4+/5) good plus   Right Shoulder ER (4/5) good   Left Shoulder ER (4+/5) good plus   Right Elbow Flexion (4+/5) good plus   Left Elbow Flexion (4+/5) good plus   Right Elbow Extension (4+/5) good plus   Left Elbow Extension (4+/5) good plus   Right Forearm Supination (4/5) good   Left Forearm Supination (4+/5) good plus   Right Forearm Pronation (4/5) good   Left Forearm Pronation (4+/5) good plus   Right Wrist Flexion (3+/5) fair plus   Left Wrist Flexion (4/5) good   Right Wrist Extension (3+/5)  fair plus   Left Wrist Extension (4/5) good   Right Wrist UD (2/5) poor   Left Wrist UD (4/5) good   Right Wrist RD (2/5) poor   Left Wrist RD (4/5) good     Outcome Measures        9/14/2023    18:07 9/21/2023    16:29 10/24/2023    14:18 10/26/2023    14:01 11/2/2023    13:20 11/14/2023    14:20   OT OBJECTIVE Outcome Measures   9 Hole Peg Test - Right Hand --        Unable to complete after 45 seconds   48.6       Removing pegs only 30.88       peg removal only 16.65       Removing only using lateral pinch   9 Hole Peg Test - Left Hand 24.55     24.06   9 Hole Peg Test - Comments Able to use gross grasp to  5 small mosaic glass beads and place in target area in 38.85 seconds        Right Hand Box and Blocks 22  18   53   Left Hand Box and Blocks 56     28   D[employer] - Impression Impaired        Other Outcome Measure  Right hand edema    Right MF to DPC = 1.5 cm       Today's Treatment:      OT NEURO FLOW SHEET    EXERCISES  Initial Evaluation  Progress Note 1  Progress Note 2  Re-evaluation CURRENT SESSION  8/16/2023  9/14/2023  10/23/23  11/14/23 TIME   NEURO RE-ED  CPT 67370 TOTAL TIME FOR SESSION 0-7 Minutes   AAROM/AROM PROM and AROM composite flexion at fingers of right hand    AROM tip pinch IF to thumb for improved motor coordination    Strengthening      Strength       Gross Motor Control     Fine Motor Control     Sitting Jennifer/Balance     Standing Jennifer/Balance     Sensory re-education      Retraining     THERE ACT  CPT 32748 TOTAL TIME FOR SESSION 38-52 Minutes   Pain, Vitals, Meds, Etc. Vitals taken, pain assessed/monitored, Rest breaks provided for frustration management    Assessment Patient was 100% full participant in today's re-evaluation with objective metrics assessed as follow: Box and Block, 9 hole peg, and  strength    HEP 11/2/23: Review pinch strengthening positions today for improved carry over    10/26/23: Added soft theraputty for right hand pinch (lateral pinch, tip  pinch, 3 jaw patti); patient expressed good understnading    Discussed carry over with use of SF splint    9/28/23: Laterality exercise added to HEP; patient and caregiver expressed good understanding    Right hand stretching    9/21/2023:   -Educated on use of rice/bean sensory bin, massage, sensory re-education with deep pressure and textures. Issued tubigrip for RUE sensory re-education and advised to wear 1x per day. Continued to discuss edema management.     Functional Mobility     Transfers     THERE EX  CPT 94811 TOTAL TIME FOR SESSION 0-7 Minutes   PROM     Activity Tolerance     SELF-CARE  CPT 26770 TOTAL TIME FOR SESSION 8-22 Minutes   Pt Education/Safety     ADL Training     IADL Training Patient participated in dressing set-up task with  from home today; she demonstrates ability to use RUE as functional assist and remove clothing from  without assistance.     MODALITIES  CPT 15667 TOTAL TIME FOR SESSION Not performed   Ice/Heat     ATTENDED E-STIM  CPT 19942 TOTAL TIME FOR SESSION Not performed   Attended E-Stim     SPLINTING  CPT 62328 TOTAL TIME FOR SESSION Not performed   Fabrication/Check Out     Fit     Training     GROUP  CPT 13138 TOTAL TIME FOR SESSION Not performed             Normative Range for Female 75+ Years of Age       Right Hand   Left Hand      Initial Evaluation Progress Note 1 Norm Initial Evaluation Progress Note 1 Norm    Strength 0 lbs 0 lbs 42.6 lbs 30 lbs 30 lbs 37.6 lbs   Lateral Pinch N/T 2 lbs 12.6 lbs N/T 14 lbs 11.4 lbs   Three Jaw Patti N/T 2 lbs (assist for position) 12 lbs N/T 11 lbs 11.5 lbs   Tip Pinch N/T N/T 9.6 lbs N/T 9 lbs 9.3 lbs   Box and Block 4 blocks 22 blocks 65 blocks 50 blocks 56 blocks 63.6 blocks   Nine Hole Peg (71+ age) Unable to complete Unable to complete 22.49 seconds N/T 24.55 seconds 24.11 seconds       Goals:     Goals      OT Neuro/Deconditioning Goals        Short Term Goals Time Frame Result Comment/Progress   Assess gross  motor control via Box and Block Assessment  4 weeks Met 9/14: R: 22; L: 56   Assess fine motor control via 9 hole peg test 4 weeks Met  9/14: R: Unable; L: 24.55   Improved automatic fine motor use at right hand with routine tasks (e.g. eating, writing, brushing teeth) to 25% of baseline  4 weeks Ongoing     Explore adaptive visual strategies to encourage looking to right side 4 weeks Met    Incorporate right hand in at least 2 self-care activities 4 weeks Met    Carry over HEP at least 3 times per week  4 weeks Met     Score at least 32 on Box and block test 4 weeks New goal    Increase right hand  strength to 8 lbs 4 weeks New goal       Long Term Goals Time Frame Result Comment/Progress   Improve gross motor control as evidenced by at least 10 block improved at RUE with Box and Block Assessment for improved ability to perform ADLs and IADLs  12 weeks Met     Patient will complete 9 hole peg test in under 3 minutes 12 weeks Ongoing    Decrease distance from Right MF to DPC to 2 cm for improved functional grasp at right hand 12 weeks Met     Improved automatic fine motor use at right hand with routine tasks (e.g. eating, writing, brushing teeth) to 75% of baseline 12 weeks Ongoing     Increase right hand  strength to at least 15 lbs 12 weeks New goal    Increase Box and Block score to at least 40 12 weeks New goal                     This 78 y.o. year old female presents to OT with above stated diagnosis. Occupational Therapy evaluation reveals coordination impaired, impaired cognition, decreased flexibility, sensation decreased resulting in self-care, home management, community/leisure limitations. Jeninfer Sams will benefit from skilled OT services to address limitation, work towards rehab and patient goals and maximize PLOF of chosen ADLs.    Planned Services: The patients treatment will include CPT 72249 Neuromuscular Reeducation, CPT 59592 Self-care/Home management training, CPT 98981 Therapeutic  activities, CPT 62847 Hot/Cold Packs therapy, .    Barry Rosas, OT

## 2023-11-17 NOTE — PROGRESS NOTES
"Speech-Language Pathology Progress Note    [facility] Fax: [phone]    SLP RE-EVALUATION FOR OUTPATIENT THERAPY    Patient: Jennifer Sams   MRN: [id number]  : 1945 78 y.o.    Referring Physician: Liv Haywood, *  Date of Visit: 2023    New Certification Dates: 23 through 24    Recommended Frequency & Duration:  2 times/week for up to 3 months   PN: 2023    Diagnosis:   1. Cerebrovascular accident (CVA), unspecified mechanism (CMS/HCC)        Chief Complaints:   Chief Complaint   Patient presents with    Speech Problem     Language, speech. \"I can't say it\"       Precautions:   Precautions Comments: aphasia, apraxia, pacemaker    TODAY'S VISIT:    Session Time:  Start Time: 1300  Stop Time: 1400  Time Calculation (min): 60 min   General Information - 23 1300        Session Details    Document Type re-evaluation     Mode of Treatment individual therapy        General Information    Onset of Illness/Injury or Date of Surgery 23     Referring Physician Dr. Haywood     History of present illness/functional impairment Jennifer is a 78 y.o. female who presented to [facility] on  after she has not been seen by her friends for a few days.  Patient lives alone and when she was found by EMS she was confused and combative.  In the emergency department she was aphasic with right-sided weakness and hypertensive to the 190 systolic.  CT scan of the head revealed an acute infarct in left MCA with mild bleeding.  Echo showed an EF of 43% and a bubble study was negative.  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 Pt transferred to  [facility] on 2023 Principal problem  Acute ischemic left MCA stroke (CMS/HCC). PMH AAA, heart block s/p cardiac pacemaker, glaucoma, hyperlipidemia, coronary artery disease, She was evaluated by speech therapy and cleared for a soft and bite-size diet with moderately thick liquids.  " 7/2/2023:  Diet SB6 with mildly thick liquids (MT2)  7/6/2023: Upgraded to EC7 with thin liquids.  Pt discharged home with 24 hr care and home health services recieving speech therapy for motor speech and aphasia.     Precautions Comments aphasia, apraxia, pacemaker     Patient/Family/Caregiver Comments/Observations Pt attended independently this date.  Able to communicate that Thelma BECERRIL was out running errands while pt attended therapies.  Additionally communicated concerns in regard to poor sleep, conveyed feels tired but mind does not stop.  Pt expressed disinterest in mediations to aid in sleep.                Daily Falls Screen - 11/14/23 1016        Daily Falls Assessment    Patient reported fall since last visit No                Pain and Vitals - 11/14/23 1015        Pain Assessment    Currently in pain No/Denies                SLP - 11/14/23 1300        Speech Therapy    Speech Therapy Specialty Traditional Neuro Program [facility]        SLP Frequency and Duration    Frequency of treatment 2 times/week     Speech Duration 3 months     SLP Custom Frequency and Duration PN: 12/14/2023     SLP Cert From 11/14/23     SLP Cert To 02/12/24     Date SLP POC was sent to provider 11/14/23     Signed SLP Plan of Care received?  No                Assessment - 11/14/23 1130        Assessment    Plan of Care reviewed and patient/family in agreement Yes     Comments Plan of care was recommended to pt to continue 2 times a week for 3 months.  Pt was in agreement.     Rehab Potential/Prognosis (SLP) adequate, monitor progress closely     Problem List Impaired communication     Clinical Assessment  has demonstrated progression in motor speech, expressive and receptive language.  Ms. Sams continues to present with a moderate receptive and expressive aphasia complicated by severe motor speech deficits.  It is recommended that Ms. Sams continue to attend speech and language therapy for further functional communication  within home and community.     Plan and Recommendations Initiate revised goals.     Planned Services [facility] [address] Speech Lang Voice Comm and/or Auditory Proc (Treatment of speech, language, voice, communication and/or hearing processing disorder)                 OBJECTIVE MEASUREMENTS/DATA:    OM: FCM/Voice/Neuro    Outcome Measures - 11/14/23 1015        Functional Communication Measures    FCM: Motor Speech 3-->Level 3   approaching 4    FCM: Spoken Language, Comprehension 4-->Level 4     FCM: Spoken Language, Expression 3-->Level 3   approaching 4                Outcome Measures        8/16/2023    17:42 8/25/2023    16:57 11/14/2023    10:15   SLP Outcome Measures   FCM: Motor Speech 2-->Level 2       Goal L4  3-->Level 3       approaching 4   FCM: Reading 3-->Level 3       Goal: maintain 3, indirect therapy, improve to 4.     FCM: Spoken Language, Comprehension --        TBD, suspect L2, goal, indirect therapy-4 2-->Level 2 4-->Level 4   FCM: Spoken Language, Expression 2-->Level 2  3-->Level 3       approaching 4       Today's Treatment:    BDAE  Eval: 8/18/2023     RE: 11/14/2023     Aphasia Severity Rating Scale 1/5  2/5   CONVERSATIONAL and EXPOSITORY SPEECH       Simple Social Responses Raw Score:   Percentiles:  Raw Score: 6/7  Percentiles: 50%tile   Complexity Index  Raw Score:    Ratio:   Percentiles:      Narrative Discourse  Short form Raw Score:   Ratio:   Percentiles:      AUDITORY COMPREHEN   Basic Word Discrimination     Raw Score: 29/37  Percentiles:20%     Raw Score: 33/37  Percentiles:40-50%   Commands  Standard Form  8/25/2023 Raw Score: 3/15  Percentiles: 0-10% Raw Score: 7/15  Percentiles: 10-20%     Complex Ideational Material  Short Form  8/25/2023    Raw Score: 0/4  Percentiles: 0%     Raw Score: 5/6  Percentiles: 70%      ARTICULATION  Agility                ORAL EXPRESSION  Recitation, Yolis, Rhythm                 Automatized Sequences  Standard Form    Recitation: 0  Percentiles:  30%tile  Yolis: 1  Percentiles: 30%tile  Rhythm: 1  Percentiles: 60%tile     Raw Score: 0/8  Percentiles: 0%tile     Recitation: 0  Percentiles: 30%tile  Yolis: 2  Percentiles: 100%tile  Rhythm: 1  Percentiles: 60%tile    Short Form  Raw Score: 2/4  Percentiles: 20%tile   REPETITION  Words  Standard Form     Repetition of Nonsense words     Sentences  Standard Form    Raw Score: 0  Percentiles:0 %tile     Raw Score:  Percentiles:      Raw Score:  Percentiles:     Raw Score: 3/10  Percentiles:10 %tile                     NAMING  Responsive Naming  Standard Form     [place] Naming Test  Standard Form    Raw Score:   Percentiles:      Raw Score:   Stim cue:   PC:   MC:   Percentiles:   Mean:   Short Form   Raw Score: 0/5  Percentiles:10% tile            READING  Match case and scripts     Number matching     Picture-Word Matching     Oral Word Reading     Oral Sentence Reading     Oral Sentence Comp     Sentence/Paragraph     Comprehension    Raw Score: 7  Percentiles: 40%tile  Raw Score:   Percentiles:   Raw Score: 10  Percentiles: 100%tile  Raw Score:   Percentiles:   Raw Score:   Percentiles:   Raw Score:   Percentiles:   Raw Score:   Percentiles:   Raw Score:   Percentiles:     Raw Score: 8  Percentiles: 100%tile  Raw Score: 8/12  Percentiles: 10  Raw Score: 7  Percentiles: 20%tile    WRITING  Form     Letter Choice     Motor Facility     Primer Words     Regular Phonics     Common Irregular Words     Written Picture Naming     Narrative Writing    Raw Score:   Percentiles:   Raw Score:   Percentiles:   Raw Score:   Percentiles:   Raw Score:   Percentiles:   Raw Score:   Percentiles:   Raw Score:   Percentiles:   Raw Score:   Percentiles:   Raw Score:   Percentiles:           IE: 8/16/2023 RE: 11/14/2023   Rating Scale Profile of Speech Characteristics 1-7 scale 1-7 scale   Articulatory Agility-phoneme and syllable level 1/7 2-3/7   Phrase Length: longest word runs 2/7 3/7   Grammatical Form: Variety and use of  morphemes    Melodic Line (Prosody)  6-   Paraphasia in Running Speech    Word Finding Relative to Fluency 1/7 3/7   Sentence Repetition    Auditory Comprehension  3     Aphasia Severity Ratin/5-   Conversations about familiar subjects is possible with help from the listener.  There are frequent failures to convey the idea, but the patient shares the burden of communication.     Provided a direct model and visual cue  Sound Initial Medial Final    p + park      b -  way/bay     t -  lung/tongue     d + day     k -  bambi/kiss     g -  olf/golf     m -  eat/meat     n - durse/nurse     ng      f - mehreen/fam     v -  ses/vest     th-vl +  think     th-v -  leese/these     s + sew     z -  mina/zoo     sh +  shirt     ch +  chair     dg +  Bean     l -  Yuli/rollins     r/ir/er +  rake     w -  dzatch/watch     bl +  blow     br      dr      fr      gl      gr      kr      kw      nt      pl      pr      sl      sp      st      sw      tr                             Goals:     Goals Addressed                 This Visit's Progress     SLP Motor Goals           NEW /Revised RE=Evaluation: 2023   Goals Short-Long Time Frame Result Comment   Will improve FCM in the following:  MOTOR SPEECH  Current:3-4  Goal:4-5  SPOKEN LANG COMP  Current:4  Goal: 5  SPOKEN LANGUAGE EXP:  Current:3  Goal:4 LTG 12 w     Pt will improve Aphasia Severity Rating Scale from 2/5 to >/=3/5 LTG 12 w     MOTOR SPEECH:  Provided Auditory bombardment, picture stimulus, direct visual/aud model, pt will produce target words with /p,b,m,w,t,d,n,s,l/ in initial position on first trial, 80% of presentations.  STG 4-6 w     Fading cues from above goal: Pt will produce CVCV, VCV and CVC syllables 80% of presentations.  STG 6-8 w     SPOKEN LANG EXP  Given simple picture cue pt will produce simple sentences of 3-4 words with simplified grammatical form and approximated content words to convey meaning, Min A, 8% of  "presentations.  STG 6-8     Pt will produce simple automatics provided mod A:  1-20  A-J  MALLY   STG 6-8     Pt will produce simple social responses, Mod A:  Name     Address (parts) STG      SPOKEN LANG COMP  The pt will demonstrate understanding of 2 noun in simple and present and progressive reversible sentences F4, 80% accy.   (Advanced language L1-2 Identify)  STG      Provided body parts (on paper), single letters, digits in 10's/100's place value, pt will demonstrate  understanding of \"point to/touch/pick up___\" commands 85% of trials.  STG      FCM: MOTOR SPEECH:  L3:  The communication partner must assume primary responsibility for interpreting the communication exchange, however the individual is able to produce short consonant-vowel combinations or automatic words intelligibly. With consistent MODERATE cueing, the individual can produce simple words and phrases intelligibly, although accuracy may vary.         FCM: Spoken Language Expression   L3: The communication partner must assume responsibility for structuring the communication exchange, and with consistent and moderate cueing, the individual can produce words and phrases that are appropriate and meaningful in context.     FCM: Spoken Language Comprehension  L4: The individual consistently responds accurately to simple yes/no questions and occasionally follows simple directions without cues.  Moderate contextual support is usually needed to understand complex sentences/messages.  The individual is able to understand limited conversations about routine daily activities with familiar communication partners.           Time Frame  Result  Comment/Progress    Will improve FCM in the following:  MOTOR SPEECH  Current:3  Goal:4-5  SPOKEN LANG COMP  Current:2  Goal: 2-3 LTG- 12 w Partially Met RE:: 2023  MOTOR SPEECH: 3, emerging to 4    SPOKEN LANG COMP:  4     Demonstrate effective speech intelligibility to communicate at single word and functional " phrase level with (min A) use of compensatory strategies with familiar listeners  LTG- 12 w MET RE:: 2023  Aphasia Severity scale, 2/5     Pt will produce bilabials /p, b, m, w/, tip alveolars /t, d, n/ and tense vowels /a, e, I, o,u/ in isolation provided mod A, 80% of trials.  STG-4w   Partially Met  RE:: 2023  Isolation.  Provided direct model with visual cues pt can produce, but inconsistent due to difficulty understanding need to imitate.   Vowels: MET   Pt will produce VC and CV syllables provided the above phonemes, Mod A, 80% of trials STG- 6-8 w   Partially Met  RE:: 2023  Benefits from visual picture, direct model and visual cues.    Pt will produce single and bisyllablic words consisting of CVCV, VCV and CVC, Mod A, 80% of presentations.  STG 8-10   Partially Met  RE:: 2023  Benefits from visual picture, direct model and visual cues and initial phonemic cue for 88% accuracy.   Pt will produce simple automatics provided mod A:  1-10  A-G  Yolis and identifiable syllable combinations for MALLY  STG- 8-10  Partially Met, continue with goal  RE:: 2023  1-10, S--min A  A-/6 min A  Mon-Sun- Min A    Yolis- MET      Pt will produce small set of functional words/phrases in unison or in imitation with SLP, then produce 3  successive attempts with 80% accuracy  STG 8-10   Not Met, improving  RE:: 2023  75% provided DM, fade cues.  Improving with reading from flash cards. Pt has not been able to perform 3 productions.    Auditory Comprehension and reading to be further assessed for determination of goals to support speech production    GOAL: 2023  Given direct model, repetition and NV reinforcement, Pt will follow 1-step commands for motor speech production up to 1-2 syllable words, 80% of presentations.     The pt will demonstrate understanding of 1 noun in simple and present and progressive sentences F4, 80% accy.   (Advanced language L1-2 Identify)                 4-6  "w              MET          MET            New  10/17/23              PN: 10/17/2023          RE: 11/14/2023  Present progressive: 100%  2 nouns [id number]%  NEXT 2 nouns reversible.    GOAL: 8/25/2023  Provided pictures/objects, and/or body parts, pt will demonstrate  understanding of \"point to___\" and \" ___\" 85% of trials.     8 w        Partially Met          RE:: 11/14/2023  Pictures and objects,    F4:100%  Body parts: 80%   NEW:   Pt will answer Y/N questions to general knowledge questions 90% of presentations.  New: 10/17/2023 Met RE:: 11/14/2023                 Magnolia Lundy, CCC-SLP  "

## 2023-11-21 ENCOUNTER — HOSPITAL ENCOUNTER (OUTPATIENT)
Dept: OCCUPATIONAL THERAPY | Age: 78
Setting detail: THERAPIES SERIES
Discharge: HOME | End: 2023-11-21
Attending: PHYSICAL MEDICINE & REHABILITATION
Payer: MEDICARE

## 2023-11-21 DIAGNOSIS — R27.9 LACK OF COORDINATION: ICD-10-CM

## 2023-11-21 DIAGNOSIS — I63.512 ACUTE ISCHEMIC LEFT MCA STROKE (CMS/HCC): Primary | ICD-10-CM

## 2023-11-21 PROCEDURE — 97112 NEUROMUSCULAR REEDUCATION: CPT | Mod: GO,KX

## 2023-11-21 PROCEDURE — 97530 THERAPEUTIC ACTIVITIES: CPT | Mod: GO,KX

## 2023-11-21 NOTE — OP OT TREATMENT LOG
OT NEURO FLOW SHEET    EXERCISES  Initial Evaluation  Progress Note 1  Progress Note 2  Re-evaluation CURRENT SESSION  8/16/2023  9/14/2023  10/23/23  11/14/23 TIME   NEURO RE-ED  CPT 57686 TOTAL TIME FOR SESSION 38-52 Minutes   AAROM/AROM     AROM tip pinch IF to thumb for improved motor coordination    Strengthening      Strength       Gross Motor Control Patient performed dual tasking in PT gym with grasp and hold onto objects with right hand and visual scanning for colors in gym  - graded up to incorporated passing between hands    Fine Motor Control Tip pinch onto towel, 3 x 10 reps  - patient performed with eyes open and then with eyes occluded    Small peg removal from pegboard  - Patient performed with eyes open and then with eyes occluded    Small peg place into pegboard with assist from therapist   - Graded up to finger to palm translation      Sitting Jennifer/Balance     Standing Jennifer/Balance     Sensory re-education      Retraining     THERE ACT  CPT 78463 TOTAL TIME FOR SESSION 8-22 Minutes   Pain, Vitals, Meds, Etc. Vitals taken, pain assessed/monitored, Rest breaks provided for frustration management    Assessment     HEP 11/2/23: Review pinch strengthening positions today for improved carry over    10/26/23: Added soft theraputty for right hand pinch (lateral pinch, tip pinch, 3 jaw patti); patient expressed good understnading    Discussed carry over with use of SF splint    9/28/23: Laterality exercise added to HEP; patient and caregiver expressed good understanding    Right hand stretching    9/21/2023:   -Educated on use of rice/bean sensory bin, massage, sensory re-education with deep pressure and textures. Issued tubigrip for RUE sensory re-education and advised to wear 1x per day. Continued to discuss edema management.     Functional Mobility     Transfers     THERE EX  CPT 89178 TOTAL TIME FOR SESSION 0-7 Minutes   PROM     Activity Tolerance     SELF-CARE  CPT 41999 TOTAL TIME FOR SESSION  0-7 Minutes   Pt Education/Safety     ADL Training     IADL Training     MODALITIES  CPT 70573 TOTAL TIME FOR SESSION Not performed   Ice/Heat     ATTENDED E-STIM  CPT 29011 TOTAL TIME FOR SESSION Not performed   Attended E-Stim     SPLINTING  CPT 24875 TOTAL TIME FOR SESSION Not performed   Fabrication/Check Out     Fit     Training     GROUP  CPT 23318 TOTAL TIME FOR SESSION Not performed             Normative Range for Female 75+ Years of Age       Right Hand   Left Hand      Initial Evaluation Progress Note 1 Norm Initial Evaluation Progress Note 1 Norm    Strength 0 lbs 0 lbs 42.6 lbs 30 lbs 30 lbs 37.6 lbs   Lateral Pinch N/T 2 lbs 12.6 lbs N/T 14 lbs 11.4 lbs   Three Jaw Yonatan N/T 2 lbs (assist for position) 12 lbs N/T 11 lbs 11.5 lbs   Tip Pinch N/T N/T 9.6 lbs N/T 9 lbs 9.3 lbs   Box and Block 4 blocks 22 blocks 65 blocks 50 blocks 56 blocks 63.6 blocks   Nine Hole Peg (71+ age) Unable to complete Unable to complete 22.49 seconds N/T 24.55 seconds 24.11 seconds

## 2023-11-21 NOTE — PROGRESS NOTES
Occupational Therapy Visit    OT DAILY NOTE FOR OUTPATIENT THERAPY    Patient: Jennifer Sams   MRN: [id number]  : 1945 78 y.o.  Referring Physician: Liv Haywood, Debra  Date of Visit: 2023      Certification Dates: 23 through 24    Diagnosis:   1. Acute ischemic left MCA stroke (CMS/HCC)    2. Lack of coordination        Chief Complaints:   Brain Injury    Precautions:       TODAY'S VISIT    Time In Session:  Start Time: 1305  Stop Time: 1400  Time Calculation (min): 55 min   History/Vitals/Pain/Encounter Info - 23 1309        Injury History/Precautions/Daily Required Info    Document Type daily treatment     Primary Therapist Barry Rosas OTR/CARYN     Chief Complaint/Reason for Visit  Brain Injury     Onset of Illness/Injury or Date of Surgery 23     Referring Physician Dr. Haywood     History of present illness/functional impairment The patient is a 78-year-old  female with a history of AAA, heart block s/p cardiac pacemaker, glaucoma, hyperlipidemia, coronary artery disease, NSTEMI, who presented to [facility] on  after she has not been seen by her friends for a few days.  Patient lives alone and when she was found by EMS she was confused and combative.  In the emergency department she was aphasic with right-sided weakness and hypertensive to the 190 systolic.  CT scan of the head revealed an acute infarct in left MCA with mild bleeding.  Echo showed an EF of 43% and a bubble study was negative.  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  Later this was transitioned to apixaban.  She was continued on statin for secondary prevention.  She required virtual shivani while in the hospital but this was not effective.  She was evaluated by speech therapy and cleared for a soft and bite-size diet with moderately thick liquids.'     Patient/Family/Caregiver  Comments/Observations Patient arrived accompanied caregiver Thelma     Patient reported fall since last visit No        Pain Assessment    Currently in pain No/Denies        Pre Activity Vital Signs    /70     BP Location Left upper arm     BP Method Manual     Patient Position Sitting         Services    Do You Speak a Language Other Than English at Home? no                Daily Treatment Assessment and Plan - 11/21/23 1410        Daily Treatment Assessment and Plan    Progress toward goals Progressing     Daily Outcome Summary Patient seen in OP OT with focus on using right hand for fine and gross motor activities. Patient able to progress today to performing tasks with eyes occluded and to dual tasking, and she was successful with graded up challenges indicating improvement in automatic motor performance at right hand (without reliance on visual stim). Patient demonstrates 0 drops during dual tasking grasping activity in gym with walking and visual scanning; she required 1 cue to grasp with right hand at her side instead of cradling toward body, and able to make adjustment without error. Patient is progressing towards her OT goals at this time.     Plan and Recommendations Progress to fine motor coordination with ulnar stabilation with simultaneous tip pinch motor control                     OBJECTIVE DATA TAKEN TODAY    None Taken    Today's Treatment:      OT NEURO FLOW SHEET    EXERCISES  Initial Evaluation  Progress Note 1  Progress Note 2  Re-evaluation CURRENT SESSION  8/16/2023  9/14/2023  10/23/23  11/14/23 TIME   NEURO RE-ED  CPT 49050 TOTAL TIME FOR SESSION 38-52 Minutes   AAROM/AROM     AROM tip pinch IF to thumb for improved motor coordination    Strengthening      Strength       Gross Motor Control Patient performed dual tasking in PT gym with grasp and hold onto objects with right hand and visual scanning for colors in gym  - graded up to incorporated passing between hands    Fine  Motor Control Tip pinch onto towel, 3 x 10 reps  - patient performed with eyes open and then with eyes occluded    Small peg removal from pegboard  - Patient performed with eyes open and then with eyes occluded    Small peg place into pegboard with assist from therapist   - Graded up to finger to palm translation      Sitting Jennifer/Balance     Standing Jennifer/Balance     Sensory re-education      Retraining     THERE ACT  CPT 36898 TOTAL TIME FOR SESSION 8-22 Minutes   Pain, Vitals, Meds, Etc. Vitals taken, pain assessed/monitored, Rest breaks provided for frustration management    Assessment     HEP 11/2/23: Review pinch strengthening positions today for improved carry over    10/26/23: Added soft theraputty for right hand pinch (lateral pinch, tip pinch, 3 jaw patti); patient expressed good understnading    Discussed carry over with use of SF splint    9/28/23: Laterality exercise added to HEP; patient and caregiver expressed good understanding    Right hand stretching    9/21/2023:   -Educated on use of rice/bean sensory bin, massage, sensory re-education with deep pressure and textures. Issued tubigrip for RUE sensory re-education and advised to wear 1x per day. Continued to discuss edema management.     Functional Mobility     Transfers     THERE EX  CPT 17827 TOTAL TIME FOR SESSION 0-7 Minutes   PROM     Activity Tolerance     SELF-CARE  CPT 22187 TOTAL TIME FOR SESSION 0-7 Minutes   Pt Education/Safety     ADL Training     IADL Training     MODALITIES  CPT 19884 TOTAL TIME FOR SESSION Not performed   Ice/Heat     ATTENDED E-STIM  CPT 90612 TOTAL TIME FOR SESSION Not performed   Attended E-Stim     SPLINTING  CPT 48485 TOTAL TIME FOR SESSION Not performed   Fabrication/Check Out     Fit     Training     GROUP  CPT 10698 TOTAL TIME FOR SESSION Not performed             Normative Range for Female 75+ Years of Age       Right Hand   Left Hand      Initial Evaluation Progress Note 1 Norm Initial Evaluation Progress  Note 1 Norm    Strength 0 lbs 0 lbs 42.6 lbs 30 lbs 30 lbs 37.6 lbs   Lateral Pinch N/T 2 lbs 12.6 lbs N/T 14 lbs 11.4 lbs   Three Jaw Yonatan N/T 2 lbs (assist for position) 12 lbs N/T 11 lbs 11.5 lbs   Tip Pinch N/T N/T 9.6 lbs N/T 9 lbs 9.3 lbs   Box and Block 4 blocks 22 blocks 65 blocks 50 blocks 56 blocks 63.6 blocks   Nine Hole Peg (71+ age) Unable to complete Unable to complete 22.49 seconds N/T 24.55 seconds 24.11 seconds

## 2023-11-22 DIAGNOSIS — Z86.73 HISTORY OF ISCHEMIC LEFT MCA STROKE: Primary | ICD-10-CM

## 2023-11-28 ENCOUNTER — HOSPITAL ENCOUNTER (OUTPATIENT)
Dept: SPEECH THERAPY | Age: 78
Setting detail: THERAPIES SERIES
Discharge: HOME | End: 2023-11-28
Attending: PHYSICAL MEDICINE & REHABILITATION
Payer: MEDICARE

## 2023-11-28 ENCOUNTER — HOSPITAL ENCOUNTER (OUTPATIENT)
Dept: OCCUPATIONAL THERAPY | Age: 78
Setting detail: THERAPIES SERIES
Discharge: HOME | End: 2023-11-28
Attending: PHYSICAL MEDICINE & REHABILITATION
Payer: MEDICARE

## 2023-11-28 DIAGNOSIS — I63.512 ACUTE ISCHEMIC LEFT MCA STROKE (CMS/HCC): Primary | ICD-10-CM

## 2023-11-28 DIAGNOSIS — R27.9 LACK OF COORDINATION: ICD-10-CM

## 2023-11-28 DIAGNOSIS — I63.9 CEREBROVASCULAR ACCIDENT (CVA), UNSPECIFIED MECHANISM (CMS/HCC): Primary | ICD-10-CM

## 2023-11-28 PROCEDURE — 97112 NEUROMUSCULAR REEDUCATION: CPT | Mod: GO,59

## 2023-11-28 PROCEDURE — 97530 THERAPEUTIC ACTIVITIES: CPT | Mod: GO,59

## 2023-11-28 PROCEDURE — 92507 TX SP LANG VOICE COMM INDIV: CPT | Mod: GN,KX

## 2023-11-28 NOTE — OP SLP TREATMENT LOG
"LANGUAGE SLP FLOW SHEET    SKILL AREA CURRENT SESSION   SPEECH THERAPY: LANGUAGE  CPT 29643    MOTOR SPEECH:  Provided Auditory bombardment, picture stimulus, direct visual/aud model, pt will produce target words with /p,b,m,w,t,d,n,s,l/ in initial position on first trial, 80% of presentations.  2023  Auditory bombardment, picture, visual stimulus  /p,b/ CVC and CV words. 20 words with cues above.    Practiced letter names: a,c, k, t, p,  d, ,m, j, u      Fading cues from above goal: Pt will produce CVCV, VCV and CVC syllables 80% of presentations.  2023  VC    SPOKEN LANG EXP  Given simple picture cue pt will produce simple sentences of 3-4 words with simplified grammatical form and approximated content words to convey meaning, Min A, 8% of presentations.     Pt will produce simple automatics provided mod A:  1-20  A-J  MALLY    Pt will produce simple social responses, Mod A:  Name     Address (parts) 2023  Flash cards- Pt    SPOKEN LANG COMP  The pt will demonstrate understanding of 2 noun in simple and present and progressive reversible sentences F4, 80% accy.   (Advanced language L1-2 Identify) 2023  L1-4, 33%, 33%, 100%, 50%  L1-2: 80%, 100%   L1:    Provided body parts (on paper), single letters, digits in 10's/100's place value, pt will demonstrate  understanding of \"point to/touch/pick up___\" commands 85% of trials. 2023  Letter ID: F4 5/10   Education/Strategy Training    Other 2023  Pt was able to share that Thelma's dog .                      "

## 2023-11-28 NOTE — PROGRESS NOTES
Speech-Language Pathology Visit      SLP DAILY NOTE FOR OUTPATIENT THERAPY    Patient: Jennifer Sams   MRN: [id number]  : 1945 78 y.o.  Referring Physician: Liv Haywood, Debra  Date of Visit: 2023      Certification Dates: 23 through 24    Diagnosis:   1. Cerebrovascular accident (CVA), unspecified mechanism (CMS/HCC)        Chief Complaints:  apraxia    Precautions:  Precautions Comments: aphasia, apraxia, pacemaker       TODAY'S VISIT:    Session Time:  Start Time: 1500  Stop Time: 1600  Time Calculation (min): 60 min   History/Vitals/Pain/Encounter Info - 23 1506        Injury History/Precautions/Daily Required Info    Primary Therapist Magnolia Lundy MS,CCC/SLP     Chief Complaint/Reason for Visit  apraxia     Onset of Illness/Injury or Date of Surgery 23     Referring Physician Dr. Haywood     Precautions Comments aphasia, apraxia, pacemaker     Limitations/Impairments safety/cognitive     Document Type daily treatment     Patient reported fall since last visit No        Pain Assessment    Currently in pain No/Denies                Daily Treatment Assessment and Plan - 23 1506        Daily Treatment Assessment and Plan    Progress toward goals Progressing     Daily Outcome Summary Improved acceptance of visual cues resulting in increased accurate productions.     Plan and Recommendations Practice flashcards with personal information.  Auditory bombardment and practice for /p and b/, practice word production.  letter/number identification and naming.  continue practice with advanced language aud comp ted.                  OBJECTIVE MEASUREMENTS TAKEN TODAY:    None Taken    Today's Treatment:    LANGUAGE SLP FLOW SHEET    SKILL AREA CURRENT SESSION   SPEECH THERAPY: LANGUAGE  CPT 58390    MOTOR SPEECH:  Provided Auditory bombardment, picture stimulus, direct visual/aud model, pt will produce target words with /p,b,m,w,t,d,n,s,l/ in initial position on  "first trial, 80% of presentations.  2023  Auditory bombardment, picture, visual stimulus  /p,b/ CVC and CV words. 20 words with cues above.    Practiced letter names: a,c, k, t, p,  d, ,m, j, u      Fading cues from above goal: Pt will produce CVCV, VCV and CVC syllables 80% of presentations.  2023  VC    SPOKEN LANG EXP  Given simple picture cue pt will produce simple sentences of 3-4 words with simplified grammatical form and approximated content words to convey meaning, Min A, 8% of presentations.     Pt will produce simple automatics provided mod A:  1-20  A-J  MALLY    Pt will produce simple social responses, Mod A:  Name     Address (parts) 2023  Flash cards- Pt    SPOKEN LANG COMP  The pt will demonstrate understanding of 2 noun in simple and present and progressive reversible sentences F4, 80% accy.   (Advanced language L1-2 Identify) 2023  L1-4, 33%, 33%, 100%, 50%  L1-2: 80%, 100%   L1:    Provided body parts (on paper), single letters, digits in 10's/100's place value, pt will demonstrate  understanding of \"point to/touch/pick up___\" commands 85% of trials. 2023  Letter ID: F4 5/10   Education/Strategy Training    Other 2023  Pt was able to share that Thelma's dog .                                                "

## 2023-11-29 NOTE — PROGRESS NOTES
Occupational Therapy Visit    OT DAILY NOTE FOR OUTPATIENT THERAPY    Patient: Jennifer Sams   MRN: [id number]  : 1945 78 y.o.  Referring Physician: Liv Haywood, Debra  Date of Visit: 2023      Certification Dates: 23 through 24    Diagnosis:   1. Acute ischemic left MCA stroke (CMS/HCC)    2. Lack of coordination        Chief Complaints:   Brain Injury    Precautions:       TODAY'S VISIT    Time In Session:  Start Time: 1605  Stop Time: 1700  Time Calculation (min): 55 min   History/Vitals/Pain/Encounter Info - 23 1603        Injury History/Precautions/Daily Required Info    Document Type daily treatment     Primary Therapist Barry Rosas OTR/CARYN     Chief Complaint/Reason for Visit  Brain Injury     Onset of Illness/Injury or Date of Surgery 23     Referring Physician Dr. Haywood     History of present illness/functional impairment The patient is a 78-year-old  female with a history of AAA, heart block s/p cardiac pacemaker, glaucoma, hyperlipidemia, coronary artery disease, NSTEMI, who presented to [facility] on  after she has not been seen by her friends for a few days.  Patient lives alone and when she was found by EMS she was confused and combative.  In the emergency department she was aphasic with right-sided weakness and hypertensive to the 190 systolic.  CT scan of the head revealed an acute infarct in left MCA with mild bleeding.  Echo showed an EF of 43% and a bubble study was negative.  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  Later this was transitioned to apixaban.  She was continued on statin for secondary prevention.  She required virtual shivani while in the hospital but this was not effective.  She was evaluated by speech therapy and cleared for a soft and bite-size diet with moderately thick liquids.'     Patient/Family/Caregiver  Comments/Observations Patient arrives today without caregiver     Patient reported fall since last visit No        Pain Assessment    Currently in pain No/Denies        Pre Activity Vital Signs    /80     BP Location Left upper arm     BP Method Manual     Patient Position Sitting         Services    Do You Speak a Language Other Than English at Home? no                Daily Treatment Assessment and Plan - 11/28/23 2126        Daily Treatment Assessment and Plan    Progress toward goals Progressing     Daily Outcome Summary Patient was seen in OP OT with focus on right hand fine motor control and demonstrates improvement with concurrent ulnar stabilization and separate use of right IF to tap targets. Patient requires visual demonstration and frequent verbal prompting and with intermittent success performing dual fine motor tasks, with reduction in drops after several repetitions. She tolerated proximal strengthening and lateral pinch strengthening today. Patient is making progress with fine motor use of right hand at this time.     Plan and Recommendations Fine motor control, coordination, strengthening at RUE; explore opportunities for purposeful occupational engagement                     OBJECTIVE DATA TAKEN TODAY    None Taken    Today's Treatment:      OT NEURO FLOW SHEET    EXERCISES  Initial Evaluation  Progress Note 1  Progress Note 2  Re-evaluation CURRENT SESSION  8/16/2023  9/14/2023  10/23/23  11/14/23 TIME   NEURO RE-ED  CPT 04125 TOTAL TIME FOR SESSION 38-52 Minutes   AAROM/AROM PROM right hand composite flexion at RF/MF/IF    AROM tip pinch IF to thumb for improved motor coordination    Strengthening BUE elbow flexion (I.e. bicep curl) with 2 lb wrist cuffs, 3 x 10 reps    RUE isometric elbow extension, 3 x 10 reps     Strength Lateral pinch, 3 x 15 with pink theraputty      Gross Motor Control Patient performed dual tasking in PT gym with grasp and hold onto objects with right  hand and visual scanning for colors in gym  - graded up to incorporated passing between hands    Fine Motor Control Tip pinch and tripod grasp with hold onto large bead  - Graded up with targeted tapping using block  - Patient prompted to say color of bead with tapping    Ulnar stabilization of bead with concurrent IF extension for tapping    Sitting Jennifer/Balance     Standing Jennifer/Balance     Sensory re-education      Retraining     THERE ACT  CPT 09168'  TOTAL TIME FOR SESSION 8-22 Minutes   Pain, Vitals, Meds, Etc. Vitals taken, pain assessed/monitored, Rest breaks provided for frustration management    Assessment     HEP  11/2/23: Review pinch strengthening positions today for improved carry over    10/26/23: Added soft theraputty for right hand pinch (lateral pinch, tip pinch, 3 jaw patti); patient expressed good understnading    Discussed carry over with use of SF splint    9/28/23: Laterality exercise added to HEP; patient and caregiver expressed good understanding    Right hand stretching    9/21/2023:   -Educated on use of rice/bean sensory bin, massage, sensory re-education with deep pressure and textures. Issued tubigrip for RUE sensory re-education and advised to wear 1x per day. Continued to discuss edema management.     Functional Mobility     Transfers     THERE EX  CPT 57341 TOTAL TIME FOR SESSION 0-7 Minutes   PROM     Activity Tolerance     SELF-CARE  CPT 43026 TOTAL TIME FOR SESSION 0-7 Minutes   Pt Education/Safety     ADL Training     IADL Training     MODALITIES  CPT 03887 TOTAL TIME FOR SESSION Not performed   Ice/Heat     ATTENDED E-STIM  CPT 53021 TOTAL TIME FOR SESSION Not performed   Attended E-Stim     SPLINTING  CPT 90966 TOTAL TIME FOR SESSION Not performed   Fabrication/Check Out     Fit     Training     GROUP  CPT 87540 TOTAL TIME FOR SESSION Not performed             Normative Range for Female 75+ Years of Age       Right Hand   Left Hand      Initial Evaluation Progress Note 1 Norm  Initial Evaluation Progress Note 1 Norm    Strength 0 lbs 0 lbs 42.6 lbs 30 lbs 30 lbs 37.6 lbs   Lateral Pinch N/T 2 lbs 12.6 lbs N/T 14 lbs 11.4 lbs   Three Jaw Yonatan N/T 2 lbs (assist for position) 12 lbs N/T 11 lbs 11.5 lbs   Tip Pinch N/T N/T 9.6 lbs N/T 9 lbs 9.3 lbs   Box and Block 4 blocks 22 blocks 65 blocks 50 blocks 56 blocks 63.6 blocks   Nine Hole Peg (71+ age) Unable to complete Unable to complete 22.49 seconds N/T 24.55 seconds 24.11 seconds

## 2023-11-29 NOTE — OP OT TREATMENT LOG
OT NEURO FLOW SHEET    EXERCISES  Initial Evaluation  Progress Note 1  Progress Note 2  Re-evaluation CURRENT SESSION  8/16/2023  9/14/2023  10/23/23  11/14/23 TIME   NEURO RE-ED  CPT 32264 TOTAL TIME FOR SESSION 38-52 Minutes   AAROM/AROM PROM right hand composite flexion at RF/MF/IF    AROM tip pinch IF to thumb for improved motor coordination    Strengthening BUE elbow flexion (I.e. bicep curl) with 2 lb wrist cuffs, 3 x 10 reps    RUE isometric elbow extension, 3 x 10 reps     Strength Lateral pinch, 3 x 15 with pink theraputty      Gross Motor Control Patient performed dual tasking in PT gym with grasp and hold onto objects with right hand and visual scanning for colors in gym  - graded up to incorporated passing between hands    Fine Motor Control Tip pinch and tripod grasp with hold onto large bead  - Graded up with targeted tapping using block  - Patient prompted to say color of bead with tapping    Ulnar stabilization of bead with concurrent IF extension for tapping    Sitting Jennifer/Balance     Standing Jennifer/Balance     Sensory re-education      Retraining     THERE ACT  CPT 01178'  TOTAL TIME FOR SESSION 8-22 Minutes   Pain, Vitals, Meds, Etc. Vitals taken, pain assessed/monitored, Rest breaks provided for frustration management    Assessment     HEP  11/2/23: Review pinch strengthening positions today for improved carry over    10/26/23: Added soft theraputty for right hand pinch (lateral pinch, tip pinch, 3 jaw patti); patient expressed good understnading    Discussed carry over with use of SF splint    9/28/23: Laterality exercise added to HEP; patient and caregiver expressed good understanding    Right hand stretching    9/21/2023:   -Educated on use of rice/bean sensory bin, massage, sensory re-education with deep pressure and textures. Issued tubigrip for RUE sensory re-education and advised to wear 1x per day. Continued to discuss edema management.     Functional Mobility     Transfers      THERE EX  CPT 72486 TOTAL TIME FOR SESSION 0-7 Minutes   PROM     Activity Tolerance     SELF-CARE  CPT 84991 TOTAL TIME FOR SESSION 0-7 Minutes   Pt Education/Safety     ADL Training     IADL Training     MODALITIES  CPT 21861 TOTAL TIME FOR SESSION Not performed   Ice/Heat     ATTENDED E-STIM  CPT 07196 TOTAL TIME FOR SESSION Not performed   Attended E-Stim     SPLINTING  CPT 13377 TOTAL TIME FOR SESSION Not performed   Fabrication/Check Out     Fit     Training     GROUP  CPT 65136 TOTAL TIME FOR SESSION Not performed             Normative Range for Female 75+ Years of Age       Right Hand   Left Hand      Initial Evaluation Progress Note 1 Norm Initial Evaluation Progress Note 1 Norm    Strength 0 lbs 0 lbs 42.6 lbs 30 lbs 30 lbs 37.6 lbs   Lateral Pinch N/T 2 lbs 12.6 lbs N/T 14 lbs 11.4 lbs   Three Jaw Yonatan N/T 2 lbs (assist for position) 12 lbs N/T 11 lbs 11.5 lbs   Tip Pinch N/T N/T 9.6 lbs N/T 9 lbs 9.3 lbs   Box and Block 4 blocks 22 blocks 65 blocks 50 blocks 56 blocks 63.6 blocks   Nine Hole Peg (71+ age) Unable to complete Unable to complete 22.49 seconds N/T 24.55 seconds 24.11 seconds

## 2023-11-30 ENCOUNTER — HOSPITAL ENCOUNTER (OUTPATIENT)
Dept: SPEECH THERAPY | Age: 78
Setting detail: THERAPIES SERIES
Discharge: HOME | End: 2023-11-30
Attending: PHYSICAL MEDICINE & REHABILITATION
Payer: MEDICARE

## 2023-11-30 DIAGNOSIS — I63.9 CEREBROVASCULAR ACCIDENT (CVA), UNSPECIFIED MECHANISM (CMS/HCC): Primary | ICD-10-CM

## 2023-11-30 PROCEDURE — 92507 TX SP LANG VOICE COMM INDIV: CPT | Mod: GN

## 2023-12-01 NOTE — ADDENDUM NOTE
Encounter addended by: Magnolia Lundy, CCC-SLP on: 12/1/2023 1:31 PM   Actions taken: Care Teams modified

## 2023-12-05 ENCOUNTER — HOSPITAL ENCOUNTER (OUTPATIENT)
Dept: OCCUPATIONAL THERAPY | Age: 78
Setting detail: THERAPIES SERIES
Discharge: HOME | End: 2023-12-05
Attending: PHYSICAL MEDICINE & REHABILITATION
Payer: MEDICARE

## 2023-12-05 DIAGNOSIS — I63.512 ACUTE ISCHEMIC LEFT MCA STROKE (CMS/HCC): Primary | ICD-10-CM

## 2023-12-05 DIAGNOSIS — R27.9 LACK OF COORDINATION: ICD-10-CM

## 2023-12-05 PROCEDURE — 97112 NEUROMUSCULAR REEDUCATION: CPT | Mod: GO,KX

## 2023-12-05 NOTE — OP OT TREATMENT LOG
OT NEURO FLOW SHEET    EXERCISES  Initial Evaluation  Progress Note 1  Progress Note 2  Re-evaluation CURRENT SESSION  8/16/2023  9/14/2023  10/23/23  11/14/23 TIME   NEURO RE-ED  CPT 48693 TOTAL TIME FOR SESSION 38-52 Minutes   AAROM/AROM Right fist open/close in elevation    Right finger abduction/adduction on tabletop    Strengthening      Strength     Gross Motor Control Gross grasp with place and remove graded pegs into semicircle pegboard    Gross motor control with catch, capture, control using RUE  - Patient progressed from controlling against body to stabilizing with RUE only    Fine Motor Control     Sitting Jennifer/Balance     Standing Jennifer/Balance     Sensory re-education      Retraining     THERE ACT  CPT 97629'  TOTAL TIME FOR SESSION 0-7 Minutes   Pain, Vitals, Meds, Etc. pain assessed/monitored, Rest breaks provided for frustration management    Assessment     HEP  11/2/23: Review pinch strengthening positions today for improved carry over    10/26/23: Added soft theraputty for right hand pinch (lateral pinch, tip pinch, 3 jaw patti); patient expressed good understnading    Discussed carry over with use of SF splint    9/28/23: Laterality exercise added to HEP; patient and caregiver expressed good understanding    Right hand stretching    9/21/2023:   -Educated on use of rice/bean sensory bin, massage, sensory re-education with deep pressure and textures. Issued tubigrip for RUE sensory re-education and advised to wear 1x per day. Continued to discuss edema management.     Functional Mobility     Transfers     THERE EX  CPT 77469 TOTAL TIME FOR SESSION 0-7 Minutes   PROM     Activity Tolerance     SELF-CARE  CPT 29603 TOTAL TIME FOR SESSION 0-7 Minutes   Pt Education/Safety     ADL Training     IADL Training     MODALITIES  CPT 34293 TOTAL TIME FOR SESSION Not performed   Ice/Heat     ATTENDED E-STIM  CPT 01312 TOTAL TIME FOR SESSION Not performed   Attended E-Stim     SPLINTING  CPT 05699 TOTAL  TIME FOR SESSION Not performed   Fabrication/Check Out     Fit     Training     GROUP  CPT 15091 TOTAL TIME FOR SESSION Not performed             Normative Range for Female 75+ Years of Age       Right Hand   Left Hand      Initial Evaluation Progress Note 1 Norm Initial Evaluation Progress Note 1 Norm    Strength 0 lbs 0 lbs 42.6 lbs 30 lbs 30 lbs 37.6 lbs   Lateral Pinch N/T 2 lbs 12.6 lbs N/T 14 lbs 11.4 lbs   Three Jaw Yonatan N/T 2 lbs (assist for position) 12 lbs N/T 11 lbs 11.5 lbs   Tip Pinch N/T N/T 9.6 lbs N/T 9 lbs 9.3 lbs   Box and Block 4 blocks 22 blocks 65 blocks 50 blocks 56 blocks 63.6 blocks   Nine Hole Peg (71+ age) Unable to complete Unable to complete 22.49 seconds N/T 24.55 seconds 24.11 seconds

## 2023-12-05 NOTE — PROGRESS NOTES
Occupational Therapy Visit    OT DAILY NOTE FOR OUTPATIENT THERAPY    Patient: Jennifer Sams   MRN: [id number]  : 1945 78 y.o.  Referring Physician: Liv Haywood, Debra  Date of Visit: 2023      Certification Dates: 23 through 24    Diagnosis:   1. Acute ischemic left MCA stroke (CMS/HCC)    2. Lack of coordination        Chief Complaints:   Brain Injury    Precautions:       TODAY'S VISIT    Time In Session:  Start Time: 1710  Stop Time: 1758  Time Calculation (min): 48 min   History/Vitals/Pain/Encounter Info - 23 1612        Injury History/Precautions/Daily Required Info    Document Type daily treatment     Primary Therapist Barry Rosas OTR/CARYN     Chief Complaint/Reason for Visit  Brain Injury     Onset of Illness/Injury or Date of Surgery 23     Referring Physician Dr. Haywood     History of present illness/functional impairment The patient is a 78-year-old  female with a history of AAA, heart block s/p cardiac pacemaker, glaucoma, hyperlipidemia, coronary artery disease, NSTEMI, who presented to [facility] on  after she has not been seen by her friends for a few days.  Patient lives alone and when she was found by EMS she was confused and combative.  In the emergency department she was aphasic with right-sided weakness and hypertensive to the 190 systolic.  CT scan of the head revealed an acute infarct in left MCA with mild bleeding.  Echo showed an EF of 43% and a bubble study was negative.  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  Later this was transitioned to apixaban.  She was continued on statin for secondary prevention.  She required virtual shivani while in the hospital but this was not effective.  She was evaluated by speech therapy and cleared for a soft and bite-size diet with moderately thick liquids.'     Patient/Family/Caregiver  Comments/Observations Patient arrives and reports stiffness at right hand today     Patient reported fall since last visit No        Pain Assessment    Currently in pain No/Denies     Preferred Pain Scale number (Numeric Rating Pain Scale)         Services    Do You Speak a Language Other Than English at Home? no                Daily Treatment Assessment and Plan - 12/05/23 1703        Daily Treatment Assessment and Plan    Progress toward goals Progressing     Daily Outcome Summary Patient seen in OP OT with focus on fine and gross motor control using RUE. Patient challenged by graded pegboard with frequent drops but able to complete larger 2 pegs from place to remove; assistance required with thinnest peg, with intermittent difficulty performing pinch to grasp and place. Patient with initial difficulty performing gross motor control activity with therapy balls and demonstrates frequent dropping; she improved with repetition and able to progress to grasp and control of ball with RUE only (no stabilization against) body and with emergence of reflexive movements to prevent ball from falling.     Plan and Recommendations Fine motor control, coordination, strengthening at RUE; explore opportunities for purposeful occupational engagement                     OBJECTIVE DATA TAKEN TODAY    None Taken    Today's Treatment:      OT NEURO FLOW SHEET    EXERCISES  Initial Evaluation  Progress Note 1  Progress Note 2  Re-evaluation CURRENT SESSION  8/16/2023  9/14/2023  10/23/23  11/14/23 TIME   NEURO RE-ED  CPT 40501 TOTAL TIME FOR SESSION 38-52 Minutes   AAROM/AROM Right fist open/close in elevation    Right finger abduction/adduction on tabletop    Strengthening      Strength     Gross Motor Control Gross grasp with place and remove graded pegs into semicircle pegboard    Gross motor control with catch, capture, control using RUE  - Patient progressed from controlling against body to stabilizing with RUE only     Fine Motor Control     Sitting Jennifer/Balance     Standing Jennifer/Balance     Sensory re-education      Retraining     THERE ACT  CPT 72542'  TOTAL TIME FOR SESSION 0-7 Minutes   Pain, Vitals, Meds, Etc. pain assessed/monitored, Rest breaks provided for frustration management    Assessment     HEP  11/2/23: Review pinch strengthening positions today for improved carry over    10/26/23: Added soft theraputty for right hand pinch (lateral pinch, tip pinch, 3 jaw yonatan); patient expressed good understnading    Discussed carry over with use of SF splint    9/28/23: Laterality exercise added to HEP; patient and caregiver expressed good understanding    Right hand stretching    9/21/2023:   -Educated on use of rice/bean sensory bin, massage, sensory re-education with deep pressure and textures. Issued tubigrip for RUE sensory re-education and advised to wear 1x per day. Continued to discuss edema management.     Functional Mobility     Transfers     THERE EX  CPT 73755 TOTAL TIME FOR SESSION 0-7 Minutes   PROM     Activity Tolerance     SELF-CARE  CPT 13946 TOTAL TIME FOR SESSION 0-7 Minutes   Pt Education/Safety     ADL Training     IADL Training     MODALITIES  CPT 03111 TOTAL TIME FOR SESSION Not performed   Ice/Heat     ATTENDED E-STIM  CPT 17421 TOTAL TIME FOR SESSION Not performed   Attended E-Stim     SPLINTING  CPT 54236 TOTAL TIME FOR SESSION Not performed   Fabrication/Check Out     Fit     Training     GROUP  CPT 37291 TOTAL TIME FOR SESSION Not performed             Normative Range for Female 75+ Years of Age       Right Hand   Left Hand      Initial Evaluation Progress Note 1 Norm Initial Evaluation Progress Note 1 Norm    Strength 0 lbs 0 lbs 42.6 lbs 30 lbs 30 lbs 37.6 lbs   Lateral Pinch N/T 2 lbs 12.6 lbs N/T 14 lbs 11.4 lbs   Three Jaw Yonatan N/T 2 lbs (assist for position) 12 lbs N/T 11 lbs 11.5 lbs   Tip Pinch N/T N/T 9.6 lbs N/T 9 lbs 9.3 lbs   Box and Block 4 blocks 22 blocks 65 blocks 50 blocks 56  blocks 63.6 blocks   Nine Hole Peg (71+ age) Unable to complete Unable to complete 22.49 seconds N/T 24.55 seconds 24.11 seconds

## 2023-12-07 ENCOUNTER — HOSPITAL ENCOUNTER (OUTPATIENT)
Dept: OCCUPATIONAL THERAPY | Age: 78
Setting detail: THERAPIES SERIES
Discharge: HOME | End: 2023-12-07
Attending: PHYSICAL MEDICINE & REHABILITATION
Payer: MEDICARE

## 2023-12-07 ENCOUNTER — HOSPITAL ENCOUNTER (OUTPATIENT)
Dept: SPEECH THERAPY | Age: 78
Setting detail: THERAPIES SERIES
Discharge: HOME | End: 2023-12-07
Attending: PHYSICAL MEDICINE & REHABILITATION
Payer: MEDICARE

## 2023-12-07 DIAGNOSIS — I63.9 CEREBROVASCULAR ACCIDENT (CVA), UNSPECIFIED MECHANISM (CMS/HCC): Primary | ICD-10-CM

## 2023-12-07 DIAGNOSIS — R27.9 LACK OF COORDINATION: ICD-10-CM

## 2023-12-07 DIAGNOSIS — I63.512 ACUTE ISCHEMIC LEFT MCA STROKE (CMS/HCC): Primary | ICD-10-CM

## 2023-12-07 PROCEDURE — 92507 TX SP LANG VOICE COMM INDIV: CPT | Mod: GN,KX

## 2023-12-07 PROCEDURE — 97112 NEUROMUSCULAR REEDUCATION: CPT | Mod: GO,KX,59

## 2023-12-07 NOTE — OP OT TREATMENT LOG
OT NEURO FLOW SHEET    EXERCISES  Initial Evaluation  Progress Note 1  Progress Note 2  Re-evaluation CURRENT SESSION  8/16/2023  9/14/2023  10/23/23  11/14/23 TIME   NEURO RE-ED  CPT 79476 TOTAL TIME FOR SESSION 38-52 Minutes   AAROM/AROM   Right finger abduction/adduction on tabletop    Strengthening      Strength     Gross Motor Control Gross motor control with catch, capture, control using RUE  - Patient progressed from controlling against body to stabilizing with RUE only  -Graded up to L/R transfer and bilateral integration with vision occluded    Fine Motor Control Right hand tip pinch grasp with concurrent ulnar stabilization of item; patient with about 75% success today    Sitting Jennifer/Balance     Standing Jennifer/Balance     Sensory re-education     Graded motor imagery Mirror box activity with performing finger abduction/adduction with left hand and observing in mirror     Retraining     THERE ACT  CPT 04206'  TOTAL TIME FOR SESSION 0-7 Minutes   Pain, Vitals, Meds, Etc. pain assessed/monitored, Rest breaks provided for frustration management    Assessment     HEP  11/2/23: Review pinch strengthening positions today for improved carry over    10/26/23: Added soft theraputty for right hand pinch (lateral pinch, tip pinch, 3 jaw patti); patient expressed good understnading    Discussed carry over with use of SF splint    9/28/23: Laterality exercise added to HEP; patient and caregiver expressed good understanding    Right hand stretching    9/21/2023:   -Educated on use of rice/bean sensory bin, massage, sensory re-education with deep pressure and textures. Issued tubigrip for RUE sensory re-education and advised to wear 1x per day. Continued to discuss edema management.     Functional Mobility     Transfers     THERE EX  CPT 75888 TOTAL TIME FOR SESSION 0-7 Minutes   PROM     Activity Tolerance     SELF-CARE  CPT 86585 TOTAL TIME FOR SESSION 0-7 Minutes   Pt Education/Safety     ADL Training     IADL  Training     MODALITIES  CPT 72369 TOTAL TIME FOR SESSION Not performed   Ice/Heat     ATTENDED E-STIM  CPT 84278 TOTAL TIME FOR SESSION Not performed   Attended E-Stim     SPLINTING  CPT 30103 TOTAL TIME FOR SESSION Not performed   Fabrication/Check Out     Fit     Training     GROUP  CPT 21867 TOTAL TIME FOR SESSION Not performed             Normative Range for Female 75+ Years of Age       Right Hand   Left Hand      Initial Evaluation Progress Note 1 Norm Initial Evaluation Progress Note 1 Norm    Strength 0 lbs 0 lbs 42.6 lbs 30 lbs 30 lbs 37.6 lbs   Lateral Pinch N/T 2 lbs 12.6 lbs N/T 14 lbs 11.4 lbs   Three Jaw Yonatan N/T 2 lbs (assist for position) 12 lbs N/T 11 lbs 11.5 lbs   Tip Pinch N/T N/T 9.6 lbs N/T 9 lbs 9.3 lbs   Box and Block 4 blocks 22 blocks 65 blocks 50 blocks 56 blocks 63.6 blocks   Nine Hole Peg (71+ age) Unable to complete Unable to complete 22.49 seconds N/T 24.55 seconds 24.11 seconds

## 2023-12-07 NOTE — PROGRESS NOTES
Speech-Language Pathology Visit      SLP DAILY NOTE FOR OUTPATIENT THERAPY    Patient: Jennifer Sams   MRN: [id number]  : 1945 78 y.o.  Referring Physician: Liv Haywood, Debra  Date of Visit: 2023      Certification Dates: 23 through 24    Diagnosis:   1. Cerebrovascular accident (CVA), unspecified mechanism (CMS/HCC)        Chief Complaints:  apraxia    Precautions:  Precautions Comments: aphasia, apraxia, pacemaker       TODAY'S VISIT:    Session Time:  Start Time: 1200  Stop Time: 1300  Time Calculation (min): 60 min   History/Vitals/Pain/Encounter Info - 23 1202        Injury History/Precautions/Daily Required Info    Primary Therapist Magnolia Lundy MS,CCC/SLP     Chief Complaint/Reason for Visit  apraxia     Onset of Illness/Injury or Date of Surgery 23     Referring Physician Dr. Haywood     Precautions Comments aphasia, apraxia, pacemaker     Limitations/Impairments safety/cognitive     Document Type daily treatment     Patient reported fall since last visit No        Pain Assessment    Currently in pain No/Denies                Daily Treatment Assessment and Plan - 23 1202        Daily Treatment Assessment and Plan    Progress toward goals Progressing     Daily Outcome Summary Improved ability to say name, birthday and address.  Improved  aud comp this date and ability to use word approximations to communicate.  Improved attention to and processing to auditory comprehension and reading comprehension tasks.     Plan and Recommendations Continue with personal information with direct model.  Auditory bombardment and practice for /p,m,b/.  Continue with advanced language ted.                  OBJECTIVE MEASUREMENTS TAKEN TODAY:    None Taken    Today's Treatment:    LANGUAGE SLP FLOW SHEET    SKILL AREA CURRENT SESSION   SPEECH THERAPY: LANGUAGE  CPT 49677    MOTOR SPEECH:  Provided Auditory bombardment, picture stimulus, direct visual/aud model, pt  will produce target words with /p,b,m,w,t,d,n,s,l/ in initial position on first trial, 80% of presentations.  2023  Practiced target sounds in numbers and letters.   Min cues for /p, b, m, w, s/  Mod A for /t, d, n, l/    2023  Auditory bombardment, picture, visual stimulus  /p,b/ CVC and CV words. 20 words with cues above.  Pt benefited from anticipation of looking at therapist face.      2023  Auditory bombardment, picture, visual stimulus  /p,b/ CVC and CV words. 20 words with cues above.    Practiced letter names: a,c, k, t, p,  d, ,m, j, u      Fading cues from above goal: Pt will produce CVCV, VCV and CVC syllables 80% of presentations.  2023  Naming ted 18/20 word approximations provided clinician model.    2023  Practiced /M,b,p/ in CV and CVC single syllables. Pt continues to benefit from a prep/anticipatory set for initial phoneme placement prior to the direct model.    2023  VC    SPOKEN LANG EXP  Given simple picture cue pt will produce simple sentences of 3-4 words with simplified grammatical form and approximated content words to convey meaning, Min A, 8% of presentations.  2023  Pt visually unscrambled 3 words to describe a picture on Advanced Comprehension ted build- Noun- Simple Sentences.   Pt will produce simple automatics provided mod A:  1-20  A-J  MALLY    Apraxia ted  1-10:  1-5  MALLY: very difficult      Pt will produce simple social responses, Mod A:  Name     Address (parts) 2023  Flashcards:   Pt says full name,  address, Mod to max A    2023  Pt produced first and last name,  Cues to produce Month, date, and year of , and address.      2023  Flash cards- Pt    SPOKEN LANG COMP  The pt will demonstrate understanding of 2 noun in simple and present and progressive reversible sentences F4, 80% accy.   (Advanced language L1-2 Identify) 2023 Field of 2- next session increase to F4  L1: 10/10 (quick)   L2: 10/10  "(quick)   L3: 10/10   L4: 10/10   L1-4: 10/10     2023  L1: 7/10, 10/10  L1-2: 9/10  L1-3: 7/10    2023  L1-4, 33%, 33%, 100%, 50%  L1-2: 80%, 100%   L1: 90%    Provided body parts (on paper), single letters, digits in 10's/100's place value, pt will demonstrate  understanding of \"point to/touch/pick up___\" commands 85% of trials. 2023  Body parts F4: 9/10    2023  Letter ID: F4 7/10    2023  Letter ID: F4 5/10     Education/Strategy Training 2023  Pt expectation was discussed this date that pt is expected to look at therapist mouth and produce vocal attempt.     Other 2023  Pt was able to share that Thelma's dog .       Sharlene Sams    6-2-3 [facility].                                          "

## 2023-12-07 NOTE — OP SLP TREATMENT LOG
LANGUAGE SLP FLOW SHEET    SKILL AREA CURRENT SESSION   SPEECH THERAPY: LANGUAGE  CPT 70416    MOTOR SPEECH:  Provided Auditory bombardment, picture stimulus, direct visual/aud model, pt will produce target words with /p,b,m,w,t,d,n,s,l/ in initial position on first trial, 80% of presentations.  2023  Practiced target sounds in numbers and letters.   Min cues for /p, b, m, w, s/  Mod A for /t, d, n, l/    2023  Auditory bombardment, picture, visual stimulus  /p,b/ CVC and CV words. 20 words with cues above.  Pt benefited from anticipation of looking at therapist face.      2023  Auditory bombardment, picture, visual stimulus  /p,b/ CVC and CV words. 20 words with cues above.    Practiced letter names: a,c, k, t, p,  d, ,m, j, u      Fading cues from above goal: Pt will produce CVCV, VCV and CVC syllables 80% of presentations.  2023  Naming ted 18/ word approximations provided clinician model.    2023  Practiced /M,b,p/ in CV and CVC single syllables. Pt continues to benefit from a prep/anticipatory set for initial phoneme placement prior to the direct model.    2023  VC    SPOKEN LANG EXP  Given simple picture cue pt will produce simple sentences of 3-4 words with simplified grammatical form and approximated content words to convey meaning, Min A, 8% of presentations.  2023  Pt visually unscrambled 3 words to describe a picture on Advanced Comprehension ted build- Noun- Simple Sentences.   Pt will produce simple automatics provided mod A:  1-20  A-J  MALLY    Apraxia ted  1-10:  1-5  MALLY: very difficult      Pt will produce simple social responses, Mod A:  Name     Address (parts) 2023  Flashcards:   Pt says full name,  address, Mod to max A    2023  Pt produced first and last name,  Cues to produce Month, date, and year of , and address.      2023  Flash cards- Pt    SPOKEN LANG COMP  The pt will demonstrate understanding of 2 noun in simple  "and present and progressive reversible sentences F4, 80% accy.   (Advanced language L1-2 Identify) 2023 Field of 2- next session increase to F4  L1: 10/10 (quick)   L2: 10/10 (quick)   L3: 10/10   L4: 10/10   L1-4: 10/10     2023  L1: 7/10, 10/10  L1-2: 9/10  L1-3: 7/10    2023  L1-4, 33%, 33%, 100%, 50%  L1-2: 80%, 100%   L1: 90%    Provided body parts (on paper), single letters, digits in 10's/100's place value, pt will demonstrate  understanding of \"point to/touch/pick up___\" commands 85% of trials. 2023  Body parts F4: 9/10    2023  Letter ID: F4 7/10    2023  Letter ID: F4 5/10     Education/Strategy Training 2023  Pt expectation was discussed this date that pt is expected to look at therapist mouth and produce vocal attempt.     Other 2023  Pt was able to share that Thelma's dog .       Sharlene Sams    6-2-3 [facility].                "

## 2023-12-08 NOTE — PROGRESS NOTES
Occupational Therapy Visit    OT DAILY NOTE FOR OUTPATIENT THERAPY    Patient: Jennifer Sams   MRN: [id number]  : 1945 78 y.o.  Referring Physician: Liv Haywood, Debra  Date of Visit: 2023      Certification Dates: 23 through 24    Diagnosis:   1. Acute ischemic left MCA stroke (CMS/HCC)    2. Lack of coordination        Chief Complaints:   Brain Injury    Precautions:       TODAY'S VISIT    Time In Session:  Start Time: 1420 (Session time limited today due to fire alarm with evacuation from clinic)  Stop Time: 1500  Time Calculation (min): 40 min   History/Vitals/Pain/Encounter Info - 23 1308        Injury History/Precautions/Daily Required Info    Document Type daily treatment     Primary Therapist Barry Rosas, OTR/L     Chief Complaint/Reason for Visit  Brain Injury     Onset of Illness/Injury or Date of Surgery 23     Referring Physician Dr. Haywood     History of present illness/functional impairment The patient is a 78-year-old  female with a history of AAA, heart block s/p cardiac pacemaker, glaucoma, hyperlipidemia, coronary artery disease, NSTEMI, who presented to [facility] on  after she has not been seen by her friends for a few days.  Patient lives alone and when she was found by EMS she was confused and combative.  In the emergency department she was aphasic with right-sided weakness and hypertensive to the 190 systolic.  CT scan of the head revealed an acute infarct in left MCA with mild bleeding.  Echo showed an EF of 43% and a bubble study was negative.  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  Later this was transitioned to apixaban.  She was continued on statin for secondary prevention.  She required virtual shivani while in the hospital but this was not effective.  She was evaluated by speech therapy and cleared for a soft and bite-size  diet with moderately thick liquids.'     Patient/Family/Caregiver Comments/Observations Patient arrives w/o complaint     Patient reported fall since last visit No        Pain Assessment    Currently in pain No/Denies         Services    Do You Speak a Language Other Than English at Home? no                Daily Treatment Assessment and Plan - 12/07/23 1900        Daily Treatment Assessment and Plan    Progress toward goals Progressing     Daily Outcome Summary Patient seen in OT for shortened session due to fire alarm with evacuation during session. During session patient participated in fine motor grasp activity with right hand and demonstrates improvement with stabilizing item in ulnar palm and performing concurrent tip pinch, with about 75% accuracy today. Patient demonstrates lacking Right SF adduction with hand on tabletop and participated in mirror box activity for neuro re-ed with focus on regaining adduction at ulnar right hand. Patient tolerated gross motor activity today and with improving ability to grasp with right hand with eyes occluded.     Plan and Recommendations Fine motor control, coordination, strengthening at RUE; explore opportunities for purposeful occupational engagement                     OBJECTIVE DATA TAKEN TODAY    None Taken    Today's Treatment:      OT NEURO FLOW SHEET    EXERCISES  Initial Evaluation  Progress Note 1  Progress Note 2  Re-evaluation CURRENT SESSION  8/16/2023  9/14/2023  10/23/23  11/14/23 TIME   NEURO RE-ED  CPT 76868 TOTAL TIME FOR SESSION 38-52 Minutes   AAROM/AROM   Right finger abduction/adduction on tabletop    Strengthening      Strength     Gross Motor Control Gross motor control with catch, capture, control using RUE  - Patient progressed from controlling against body to stabilizing with RUE only  -Graded up to L/R transfer and bilateral integration with vision occluded    Fine Motor Control Right hand tip pinch grasp with concurrent ulnar  stabilization of item; patient with about 75% success today    Sitting Jennifer/Balance     Standing Jennifer/Balance     Sensory re-education     Graded motor imagery Mirror box activity with performing finger abduction/adduction with left hand and observing in mirror     Retraining     THERE ACT  CPT 17578'  TOTAL TIME FOR SESSION 0-7 Minutes   Pain, Vitals, Meds, Etc. pain assessed/monitored, Rest breaks provided for frustration management    Assessment     HEP  11/2/23: Review pinch strengthening positions today for improved carry over    10/26/23: Added soft theraputty for right hand pinch (lateral pinch, tip pinch, 3 jaw yonatan); patient expressed good understnading    Discussed carry over with use of SF splint    9/28/23: Laterality exercise added to HEP; patient and caregiver expressed good understanding    Right hand stretching    9/21/2023:   -Educated on use of rice/bean sensory bin, massage, sensory re-education with deep pressure and textures. Issued tubigrip for RUE sensory re-education and advised to wear 1x per day. Continued to discuss edema management.     Functional Mobility     Transfers     THERE EX  CPT 08745 TOTAL TIME FOR SESSION 0-7 Minutes   PROM     Activity Tolerance     SELF-CARE  CPT 01627 TOTAL TIME FOR SESSION 0-7 Minutes   Pt Education/Safety     ADL Training     IADL Training     MODALITIES  CPT 56595 TOTAL TIME FOR SESSION Not performed   Ice/Heat     ATTENDED E-STIM  CPT 32444 TOTAL TIME FOR SESSION Not performed   Attended E-Stim     SPLINTING  CPT 66647 TOTAL TIME FOR SESSION Not performed   Fabrication/Check Out     Fit     Training     GROUP  CPT 95809 TOTAL TIME FOR SESSION Not performed             Normative Range for Female 75+ Years of Age       Right Hand   Left Hand      Initial Evaluation Progress Note 1 Norm Initial Evaluation Progress Note 1 Norm    Strength 0 lbs 0 lbs 42.6 lbs 30 lbs 30 lbs 37.6 lbs   Lateral Pinch N/T 2 lbs 12.6 lbs N/T 14 lbs 11.4 lbs   Three Jaw Yonatan  N/T 2 lbs (assist for position) 12 lbs N/T 11 lbs 11.5 lbs   Tip Pinch N/T N/T 9.6 lbs N/T 9 lbs 9.3 lbs   Box and Block 4 blocks 22 blocks 65 blocks 50 blocks 56 blocks 63.6 blocks   Nine Hole Peg (71+ age) Unable to complete Unable to complete 22.49 seconds N/T 24.55 seconds 24.11 seconds

## 2023-12-14 ENCOUNTER — HOSPITAL ENCOUNTER (OUTPATIENT)
Dept: SPEECH THERAPY | Age: 78
Setting detail: THERAPIES SERIES
Discharge: HOME | End: 2023-12-14
Attending: PHYSICAL MEDICINE & REHABILITATION
Payer: MEDICARE

## 2023-12-14 ENCOUNTER — HOSPITAL ENCOUNTER (OUTPATIENT)
Dept: OCCUPATIONAL THERAPY | Age: 78
Setting detail: THERAPIES SERIES
Discharge: HOME | End: 2023-12-14
Attending: PHYSICAL MEDICINE & REHABILITATION
Payer: MEDICARE

## 2023-12-14 DIAGNOSIS — I63.9 CEREBROVASCULAR ACCIDENT (CVA), UNSPECIFIED MECHANISM (CMS/HCC): Primary | ICD-10-CM

## 2023-12-14 DIAGNOSIS — R27.9 LACK OF COORDINATION: ICD-10-CM

## 2023-12-14 DIAGNOSIS — I63.512 ACUTE ISCHEMIC LEFT MCA STROKE (CMS/HCC): Primary | ICD-10-CM

## 2023-12-14 PROCEDURE — 97112 NEUROMUSCULAR REEDUCATION: CPT | Mod: GO,KX,59

## 2023-12-14 PROCEDURE — 97530 THERAPEUTIC ACTIVITIES: CPT | Mod: GO,KX,59

## 2023-12-14 PROCEDURE — 92507 TX SP LANG VOICE COMM INDIV: CPT | Mod: GN,KX

## 2023-12-14 ASSESSMENT — 9 HOLE PEG TEST: TEST_RESULT: 180

## 2023-12-14 NOTE — PROGRESS NOTES
Speech-Language Pathology Progress Note      SLP PROGRESS NOTE FOR OUTPATIENT THERAPY    Patient: Jennifer Sams MRN: [id number]  : 1945 78 y.o.  Referring Physician: Liv Haywood, Debra  Date of Visit: 2023      Certification Dates: 23 through 24    Recommended Frequency & Duration:  2 times/week for up to 3 months   PN: 2023    Diagnosis:   1. Cerebrovascular accident (CVA), unspecified mechanism (CMS/HCC)        Chief Complaints:  Chief Complaint   Patient presents with   • Speech Problem     Aphasia.  I can't say everything I want to.        Precautions:   Precautions Comments: aphasia, apraxia, pacemaker    TODAY'S VISIT:    Session Time:  Start Time: 1600  Stop Time: 1700  Time Calculation (min): 60 min   General Information - 23 1607        Session Details    Document Type progress note     Mode of Treatment individual therapy        General Information    Onset of Illness/Injury or Date of Surgery 23     Referring Physician Dr. Wang     History of present illness/functional impairment Jennifer is a 78 y.o. female who presented to [facility] on  after she has not been seen by her friends for a few days.  Patient lives alone and when she was found by EMS she was confused and combative.  In the emergency department she was aphasic with right-sided weakness and hypertensive to the 190 systolic.  CT scan of the head revealed an acute infarct in left MCA with mild bleeding.  Echo showed an EF of 43% and a bubble study was negative.  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 Pt transferred to  [facility] on 2023 Principal problem  Acute ischemic left MCA stroke (CMS/HCC). PMH AAA, heart block s/p cardiac pacemaker, glaucoma, hyperlipidemia, coronary artery disease, She was evaluated by speech therapy and cleared for a soft and bite-size diet with moderately thick liquids.  2023:  Diet SB6 with mildly thick  liquids (MT2)  7/6/2023: Upgraded to EC7 with thin liquids.  Pt discharged home with 24 hr care and home health services recieving speech therapy for motor speech and aphasia.     Precautions Comments aphasia, apraxia, pacemaker     Limitations/Impairments safety/cognitive     Patient/Family/Caregiver Comments/Observations Pt reported losing hair and weight.  Pt reported encouraging Thelma to allow her to participate in therapy independently.                  Pain and Vitals - 12/14/23 1607        Pain Assessment    Currently in pain No/Denies                SLP - 12/14/23 1607        Speech Therapy    Speech Therapy Specialty Traditional Neuro Program ST        SLP Frequency and Duration    Frequency of treatment 2 times/week     Speech Duration 3 months     SLP Custom Frequency and Duration PN: 12/14/2023     SLP Cert From 11/14/23     SLP Cert To 02/12/24     Date SLP POC was sent to provider 11/14/23     Signed SLP Plan of Care received?  Yes                Assessment - 12/14/23 1709        Assessment    Plan of Care reviewed and patient/family in agreement Yes     Clinical Assessment Ms. Sams is making progress in goals directed toward motor speech, language comprehension and language expression.    can produce her name, birthday and segments of her address, as well as simple words and phrases as judged as intelligible, but notably with phoneme distortions and substitutions.  Ms. Sams is able to initiate communication using spoken language in simple, structured conversation in routine daily activities as well as understand communication in structured conversations with both familiar and unfamiliar communication partners.  Ms. Sams will continue to benefit from skilled speech and language therapy.     Plan and Recommendations Auditory bombardment and practice for /p,m,b/ CVCV and VCV and ,w,t,d,n,s,l in CVC .  Continue with advanced language ted.     Planned Services CPT 83480 Speech Lang Voice Comm  and/or Auditory Proc (Treatment of speech, language, voice, communication and/or hearing processing disorder)                 OBJECTIVE MEASUREMENTS/DATA:    None Taken    Outcome Measures        8/16/2023    17:42 8/25/2023    16:57 11/14/2023    10:15 12/14/2023    17:09   SLP Outcome Measures   FCM: Motor Speech 2-->Level 2       Goal L4  3-->Level 3       approaching 4 4-->Level 4   FCM: Reading 3-->Level 3       Goal: maintain 3, indirect therapy, improve to 4.      FCM: Spoken Language, Comprehension --        TBD, suspect L2, goal, indirect therapy-4 2-->Level 2 4-->Level 4 5-->Level 5   FCM: Spoken Language, Expression 2-->Level 2  3-->Level 3       approaching 4 4-->Level 4       Today's Treatment::     Education provided:  Yes: See treatment log for details of education provided  Readiness: acceptance  Response: Needs reinforcement and Verbalizes understanding    LANGUAGE SLP FLOW SHEET    SKILL AREA CURRENT SESSION   SPEECH THERAPY: LANGUAGE  CPT 99541    MOTOR SPEECH:  Provided Auditory bombardment, picture stimulus, direct visual/aud model, pt will produce target words with /p,b,m,w,t,d,n,s,l/ in initial position on first trial, 80% of presentations.  12/14/2023  CVC  /b/ 10/11  /p/ 10/10  /m/ 8/9 12/7/2023  Practiced target sounds in numbers and letters.   Min cues for /p, b, m, w, s/  Mod A for /t, d, n, l/    11/30/2023  Auditory bombardment, picture, visual stimulus  /p,b/ CVC and CV words. 20 words with cues above.  Pt benefited from anticipation of looking at therapist face.      11/28/2023  Auditory bombardment, picture, visual stimulus  /p,b/ CVC and CV words. 20 words with cues above.    Practiced letter names: a,c, k, t, p,  d, ,m, j, u      Fading cues from above goal: Pt will produce CVCV, VCV and CVC syllables 80% of presentations.  12/7/2023  Naming ted 18/20 word approximations provided clinician model.    11/30/2023  Practiced /M,b,p/ in CV and CVC single syllables. Pt continues to  "benefit from a prep/anticipatory set for initial phoneme placement prior to the direct model.    2023  VC    SPOKEN LANG EXP  Given simple picture cue pt will produce simple sentences of 3-4 words with simplified grammatical form and approximated content words to convey meaning, Min A, 8% of presentations.  2023  Pt visually unscrambled 3 words to describe a picture on Advanced Comprehension kristel build- Noun- Simple Sentences.   Pt will produce simple automatics provided mod A:  1-20  A-J  MALLY 2023  Apraxia Kristel:  1-10: Improving  MALLY: improving  A-Z: improving with use of words at end of ApraxiaApp       Apraxia kristel  1-10:  1-5  MALLY: very difficult      Pt will produce simple social responses, Mod A:  Name     Address (parts) 2023  Confrontational questions:pt produced-full name, (S),  Mod A     2023  Flashcards:   Pt says full name,  address, Mod to max A    2023  Pt produced first and last name,  Cues to produce Month, date, and year of , and address.      2023  Flash cards- Pt    SPOKEN LANG COMP  The pt will demonstrate understanding of 2 noun in simple and present and progressive reversible sentences F4, 80% accy.   (Advanced language L1-2 Identify) 2023  F4  L1-4: 8/10 (quick)  L5: 9/10 2 nouns, non-reversible passive  L6: 7/10 2 nouns reversible passive  L7: 9/10, 3 nouns non-reversible    2023 Field of 2- next session increase to F4  L1: 10/10 (quick)   L2: 10/10 (quick)   L3: 10/10   L4: 10/10   L1-4: 10/10     2023  L1: 7/10, 10/10  L1-2: 9/10  L1-3: 7/10    2023  L1-4, 33%, 33%, 100%, 50%  L1-2: 80%, 100%   L1: 90%    Provided body parts (on paper), single letters, digits in 10's/100's place value, pt will demonstrate  understanding of \"point to/touch/pick up___\" commands 85% of trials. 2023  Datalot Language Kristel  Body parts F6: 9/10  10 place value numbers , F10.      2023  Datalot Language Kristel  Body parts F4: " 9/10    2023  Letter ID: F4 7/10    2023  Letter ID: F4 5/10     Education/Strategy Training 2023  Pt expectation was discussed this date that pt is expected to look at therapist mouth and produce vocal attempt.     Other 2023  Pt reports losing weight because unable to cook and read cook books,  Not enjoying food people bring.    2023  Pt was able to share that Thelma's dog .       Sharlene Sams    6-2-3 [facility].       s able to understand communication in structured conversations with both familiar and unfamiliar communication partners.         Goals Addressed                 This Visit's Progress    • SLP Motor Goals           NEW /Revised RE=Evaluation: 2023   Goals Short-Long Time Frame Result Comment   Will improve FCM in the following:  MOTOR SPEECH  Current:3-4  Goal:4-5, revised to 5-6  SPOKEN LANG COMP  Current:4  Goal: 5, revised to 6  SPOKEN LANGUAGE EXP:  Current:3  Goal:4, revised to 5 LTG 12 w Met, revise to increase FCM PN: 2023  Motor Speech: 4  Spoken lang comp: 5  Spoken lang expression:4     Pt will improve Aphasia Severity Rating Scale from 2/5 to >/=3/5 LTG 12 w     MOTOR SPEECH:  Provided Auditory bombardment, picture stimulus, direct visual/aud model, pt will produce target words with /p,b,m,w,t,d,n,s,l/ in initial position on first trial, 80% of presentations.  STG 4-6 w Continue with /w,t,d,n,s,l PN: 2023  /p,b,m/ 89% or greater in CVC   Fading cues from above goal: Pt will produce CVCV, VCV and CVC syllables 80% of presentations.  STG 6-8 w Continue  Practice CVCV and VCV PN: 2023  Practice /p,b,m/ to     SPOKEN LANG EXP  Given simple picture cue pt will produce simple sentences of 3-4 words with simplified grammatical form and approximated content words to convey meaning, Min A, 8% of presentations.  STG 6-8 Continue PN:2023  Unscrambled 3 words to describe picture (S)   Pt will produce  "simple automatics provided mod A:  1-20  A-J  MALLY   STG 6-8 Continue PN: 2023  1-10: Improving 50%  MALLY: improving 50%  A-Z: improving with use of words at end of ApraxiaApp, provided direct model for each letter.73%     Pt will produce simple social responses, Mod A:  Name     Address (parts) STG 4-6 MET, revise to (S) PN: 2023  Full name- (S)  : Mod A  Address:Mod A   SPOKEN LANG COMP  The pt will demonstrate understanding of 2 noun in simple and present and progressive reversible sentences F4, 80% accy.   (Advanced language L1-2 Identify)  STG 4-6 w Partially met,  PN: 2023  70%, will continue with goal and practice skills for 3 nouns reversible passive.      Provided body parts (on paper), single letters, digits in 10's/100's place value, pt will demonstrate  understanding of \"point to/touch/pick up___\" commands 85% of trials.  STG  Partially met PN: 2023  Body parts F6: 9/10  10 place value numbers 7/7, F10.    FCM MOTOR SPEECH  L4: In simple structured conversation with familiar communication partners, the individual can produce simple words and phrases intelligibly.  The individual usually requires moderate cueing in order to produce simple sentences intelligibly, although accuracy may vary.         FCM: Spoken Language Expression   L4: The individual is successfully able to initiate communication using spoken language in simple, structured conversation in routine daily activities with familiar communication partners.  The individual usually requires moderate cueing, but is able to demonstrate use of simple sentences (ie, semantics, syntax, and morphology) and rarely uses complex sentences/messages.        FCM: Spoken Language Comprehension  L5: The individual is able to understand communication in structured conversations with both familiar and unfamiliar communication partners.  The individual occasionally requires minimal cueing to understand more complex sentences/messages.  " The individual occasionally initiates the use of compensatory strategies when encountering difficulty.                Time Frame  Result  Comment/Progress    Will improve FCM in the following:  MOTOR SPEECH  Current:3  Goal:4-5  SPOKEN LANG COMP  Current:2  Goal: 2-3 LTG- 12 w Partially Met RE:: 2023  MOTOR SPEECH: 3, emerging to 4    SPOKEN LANG COMP:  4     Demonstrate effective speech intelligibility to communicate at single word and functional phrase level with (min A) use of compensatory strategies with familiar listeners  LTG- 12 w MET RE:: 2023  Aphasia Severity scale, 2/5     Pt will produce bilabials /p, b, m, w/, tip alveolars /t, d, n/ and tense vowels /a, e, I, o,u/ in isolation provided mod A, 80% of trials.  STG-4w   Partially Met  RE:: 2023  Isolation.  Provided direct model with visual cues pt can produce, but inconsistent due to difficulty understanding need to imitate.   Vowels: MET   Pt will produce VC and CV syllables provided the above phonemes, Mod A, 80% of trials STG- 6-8 w   Partially Met  RE:: 2023  Benefits from visual picture, direct model and visual cues.    Pt will produce single and bisyllablic words consisting of CVCV, VCV and CVC, Mod A, 80% of presentations.  STG 8-10   Partially Met  RE:: 2023  Benefits from visual picture, direct model and visual cues and initial phonemic cue for 88% accuracy.   Pt will produce simple automatics provided mod A:  1-10  A-G  Yolis and identifiable syllable combinations for MALLY  STG- 8-10  Partially Met, continue with goal  RE:: 2023  1-10, S--min A  A-/6 min A  Mon-Sun- Min A    Yolis- MET      Pt will produce small set of functional words/phrases in unison or in imitation with SLP, then produce 3  successive attempts with 80% accuracy  STG 8-10   Not Met, improving  RE:: 2023  75% provided DM, fade cues.  Improving with reading from flash cards. Pt has not been able to perform 3 productions.   "  Auditory Comprehension and reading to be further assessed for determination of goals to support speech production    GOAL: 8/25/2023  Given direct model, repetition and NV reinforcement, Pt will follow 1-step commands for motor speech production up to 1-2 syllable words, 80% of presentations.     The pt will demonstrate understanding of 1 noun in simple and present and progressive sentences F4, 80% accy.   (Advanced language L1-2 Identify)                 4-6 w              MET          MET            New  10/17/23              PN: 10/17/2023          RE: 11/14/2023  Present progressive: 100%  2 nouns non-[id number]%  NEXT 2 nouns reversible.    GOAL: 8/25/2023  Provided pictures/objects, and/or body parts, pt will demonstrate  understanding of \"point to___\" and \" ___\" 85% of trials.     8 w        Partially Met          RE:: 11/14/2023  Pictures and objects,    F4:100%  Body parts: 80%   NEW:   Pt will answer Y/N questions to general knowledge questions 90% of presentations.  New: 10/17/2023 Met RE:: 11/14/2023                       "

## 2023-12-14 NOTE — OP SLP TREATMENT LOG
LANGUAGE SLP FLOW SHEET    SKILL AREA CURRENT SESSION   SPEECH THERAPY: LANGUAGE  CPT 58437    MOTOR SPEECH:  Provided Auditory bombardment, picture stimulus, direct visual/aud model, pt will produce target words with /p,b,m,w,t,d,n,s,l/ in initial position on first trial, 80% of presentations.  2023  CVC  /b/ 10/11  /p/ 10/10  /m/ 8/9    2023  Practiced target sounds in numbers and letters.   Min cues for /p, b, m, w, s/  Mod A for /t, d, n, l/    2023  Auditory bombardment, picture, visual stimulus  /p,b/ CVC and CV words. 20 words with cues above.  Pt benefited from anticipation of looking at therapist face.      2023  Auditory bombardment, picture, visual stimulus  /p,b/ CVC and CV words. 20 words with cues above.    Practiced letter names: a,c, k, t, p,  d, ,m, j, u      Fading cues from above goal: Pt will produce CVCV, VCV and CVC syllables 80% of presentations.  2023  Naming kristel 18/20 word approximations provided clinician model.    2023  Practiced /M,b,p/ in CV and CVC single syllables. Pt continues to benefit from a prep/anticipatory set for initial phoneme placement prior to the direct model.    2023  VC    SPOKEN LANG EXP  Given simple picture cue pt will produce simple sentences of 3-4 words with simplified grammatical form and approximated content words to convey meaning, Min A, 8% of presentations.  2023  Pt visually unscrambled 3 words to describe a picture on Advanced Comprehension kristel build- Noun- Simple Sentences.   Pt will produce simple automatics provided mod A:  1-20  A-J  MALLY 2023  Apraxia Kristel:  1-10: Improving  MALLY: improving  A-Z: improving with use of words at end of ApraxiaApp       Apraxia kristel  1-10:  1-5  MALLY: very difficult      Pt will produce simple social responses, Mod A:  Name     Address (parts) 2023  Confrontational questions:pt produced-full name, (S),  Mod A     2023  Flashcards:   Pt says full name,  " address, Mod to max A    2023  Pt produced first and last name,  Cues to produce Month, date, and year of , and address.      2023  Flash cards- Pt    SPOKEN LANG COMP  The pt will demonstrate understanding of 2 noun in simple and present and progressive reversible sentences F4, 80% accy.   (Advanced language L1-2 Identify) 2023  F4  L1-4: 8/10 (quick)  L5: 9/10 2 nouns, non-reversible passive  L6: 7/10 2 nouns reversible passive  L7: 9/10, 3 nouns non-reversible    2023 Field of 2- next session increase to F4  L1: 10/10 (quick)   L2: 10/10 (quick)   L3: 10/10   L4: 10/10   L1-4: 10/10     2023  L1: 7/10, 10/10  L1-2: 9/10  L1-3: 7/10    2023  L1-4, 33%, 33%, 100%, 50%  L1-2: 80%, 100%   L1: 90%    Provided body parts (on paper), single letters, digits in 10's/100's place value, pt will demonstrate  understanding of \"point to/touch/pick up___\" commands 85% of trials. 2023  [employer] Language Kristel  Body parts F6: 9/10  10 place value numbers , F10.      2023  Tactus Language Kristel  Body parts F4: 9/10    2023  Letter ID: F4 7/10    2023  Letter ID: F4 5/10     Education/Strategy Training 2023  Pt expectation was discussed this date that pt is expected to look at therapist mouth and produce vocal attempt.     Other 2023  Pt reports losing weight because unable to cook and read cook books,  Not enjoying food people bring.    2023  Pt was able to share that Thelma's dog .       Sharlene Sams    6-2-3 [facility].       s able to understand communication in structured conversations with both familiar and unfamiliar communication partners.    "

## 2023-12-14 NOTE — PROGRESS NOTES
Speech-Language Pathology Progress Note      SLP PROGRESS NOTE FOR OUTPATIENT THERAPY    Patient: Jennifer Sams MRN: [id number]  : 1945 78 y.o.  Referring Physician: Liv Haywood, Debra  Date of Visit: 2023      Certification Dates: 23 through 24    Recommended Frequency & Duration:  2 times/week for up to 3 months   PN: 2023    Diagnosis:   1. Cerebrovascular accident (CVA), unspecified mechanism (CMS/HCC)        Chief Complaints:  Chief Complaint   Patient presents with   • Speech Problem     Aphasia.  I can't say everything I want to.        Precautions:   Precautions Comments: aphasia, apraxia, pacemaker    TODAY'S VISIT:    Session Time:  Start Time: 1600  Stop Time: 1700  Time Calculation (min): 60 min   General Information - 23 1607        Session Details    Document Type progress note     Mode of Treatment individual therapy        General Information    Onset of Illness/Injury or Date of Surgery 23     Referring Physician Dr. Wang     History of present illness/functional impairment Jennifer is a 78 y.o. female who presented to [facility] on  after she has not been seen by her friends for a few days.  Patient lives alone and when she was found by EMS she was confused and combative.  In the emergency department she was aphasic with right-sided weakness and hypertensive to the 190 systolic.  CT scan of the head revealed an acute infarct in left MCA with mild bleeding.  Echo showed an EF of 43% and a bubble study was negative.  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 Pt transferred to  [facility] on 2023 Principal problem  Acute ischemic left MCA stroke (CMS/HCC). PMH AAA, heart block s/p cardiac pacemaker, glaucoma, hyperlipidemia, coronary artery disease, She was evaluated by speech therapy and cleared for a soft and bite-size diet with moderately thick liquids.  2023:  Diet SB6 with mildly thick  liquids (MT2)  7/6/2023: Upgraded to EC7 with thin liquids.  Pt discharged home with 24 hr care and home health services recieving speech therapy for motor speech and aphasia.     Precautions Comments aphasia, apraxia, pacemaker     Limitations/Impairments safety/cognitive     Patient/Family/Caregiver Comments/Observations Pt reported losing hair and weight.  Pt reported encouraging Thelma to allow her to participate in therapy independently.                  Pain and Vitals - 12/14/23 1607        Pain Assessment    Currently in pain No/Denies                SLP - 12/14/23 1607        Speech Therapy    Speech Therapy Specialty Traditional Neuro Program ST        SLP Frequency and Duration    Frequency of treatment 2 times/week     Speech Duration 3 months     SLP Custom Frequency and Duration PN: 12/14/2023     SLP Cert From 11/14/23     SLP Cert To 02/12/24     Date SLP POC was sent to provider 11/14/23     Signed SLP Plan of Care received?  Yes                Assessment - 12/14/23 1709        Assessment    Plan of Care reviewed and patient/family in agreement Yes     Clinical Assessment Ms. Sams is making progress in goals directed toward motor speech, language comprehension and language expression.    can produce her name, birthday and segments of her address, as well as simple words and phrases as judged as intelligible, but notably with phoneme distortions and substitutions.  Ms. Sams is able to initiate communication using spoken language in simple, structured conversation in routine daily activities as well as understand communication in structured conversations with both familiar and unfamiliar communication partners.  Ms. Sams will continue to benefit from skilled speech and language therapy.     Plan and Recommendations Auditory bombardment and practice for /p,m,b/ CVCV and VCV and ,w,t,d,n,s,l in CVC .  Continue with advanced language ted.     Planned Services CPT 70820 Speech Lang Voice Comm  and/or Auditory Proc (Treatment of speech, language, voice, communication and/or hearing processing disorder)                 OBJECTIVE MEASUREMENTS/DATA:    None Taken    Outcome Measures        8/16/2023    17:42 8/25/2023    16:57 11/14/2023    10:15   SLP Outcome Measures   FCM: Motor Speech 2-->Level 2       Goal L4  3-->Level 3       approaching 4   FCM: Reading 3-->Level 3       Goal: maintain 3, indirect therapy, improve to 4.     FCM: Spoken Language, Comprehension --        TBD, suspect L2, goal, indirect therapy-4 2-->Level 2 4-->Level 4   FCM: Spoken Language, Expression 2-->Level 2  3-->Level 3       approaching 4       Today's Treatment::     Education provided:  Yes: Methods: Discussion and Demonstration  Readiness: acceptance  Response: Needs reinforcement and Verbalizes understanding    LANGUAGE SLP FLOW SHEET    SKILL AREA CURRENT SESSION   SPEECH THERAPY: LANGUAGE  CPT 35175    MOTOR SPEECH:  Provided Auditory bombardment, picture stimulus, direct visual/aud model, pt will produce target words with /p,b,m,w,t,d,n,s,l/ in initial position on first trial, 80% of presentations.  12/14/2023  CVC  /b/ 10/11  /p/ 10/10  /m/ 8/9    12/7/2023  Practiced target sounds in numbers and letters.   Min cues for /p, b, m, w, s/  Mod A for /t, d, n, l/    11/30/2023  Auditory bombardment, picture, visual stimulus  /p,b/ CVC and CV words. 20 words with cues above.  Pt benefited from anticipation of looking at therapist face.      11/28/2023  Auditory bombardment, picture, visual stimulus  /p,b/ CVC and CV words. 20 words with cues above.    Practiced letter names: a,c, k, t, p,  d, ,m, j, u      Fading cues from above goal: Pt will produce CVCV, VCV and CVC syllables 80% of presentations.  12/7/2023  Naming ted 18/20 word approximations provided clinician model.    11/30/2023  Practiced /M,b,p/ in CV and CVC single syllables. Pt continues to benefit from a prep/anticipatory set for initial phoneme placement prior  "to the direct model.    2023  VC    SPOKEN LANG EXP  Given simple picture cue pt will produce simple sentences of 3-4 words with simplified grammatical form and approximated content words to convey meaning, Min A, 8% of presentations.  2023  Pt visually unscrambled 3 words to describe a picture on Advanced Comprehension kristel build- Noun- Simple Sentences.   Pt will produce simple automatics provided mod A:  1-20  A-J  MALLY 2023  Apraxia Kristel:  1-10: Improving  MALLY: improving  A-Z: improving with use of words at end of ApraxiaApp       Apraxia kristel  1-10:  1-5  MALLY: very difficult      Pt will produce simple social responses, Mod A:  Name     Address (parts) 2023  Confrontational questions:pt produced-full name, (S),  Mod A     2023  Flashcards:   Pt says full name,  address, Mod to max A    2023  Pt produced first and last name,  Cues to produce Month, date, and year of , and address.      2023  Flash cards- Pt    SPOKEN LANG COMP  The pt will demonstrate understanding of 2 noun in simple and present and progressive reversible sentences F4, 80% accy.   (Advanced language L1-2 Identify) 2023  F4  L1-4: 8/10 (quick)  L5: 9/10 2 nouns, non-reversible passive  L6: 7/10 2 nouns reversible passive  L7: 9/10, 3 nouns non-reversible    2023 Field of 2- next session increase to F4  L1: 10/10 (quick)   L2: 10/10 (quick)   L3: 10/10   L4: 10/10   L1-4: 10/10     2023  L1: 7/10, 10/10  L1-2: 9/10  L1-3: 7/10    2023  L1-4, 33%, 33%, 100%, 50%  L1-2: 80%, 100%   L1: 90%    Provided body parts (on paper), single letters, digits in 10's/100's place value, pt will demonstrate  understanding of \"point to/touch/pick up___\" commands 85% of trials. 2023  Tactus Language Kristel  Body parts F6: 9/10  10 place value numbers , F10.      2023  Transactis Language Kristel  Body parts F4: 9/10    2023  Letter ID: F4 7/10    2023  Letter ID: F4 " 5/10     Education/Strategy Training 2023  Pt expectation was discussed this date that pt is expected to look at therapist mouth and produce vocal attempt.     Other 2023  Pt reports losing weight because unable to cook and read cook books,  Not enjoying food people bring.    2023  Pt was able to share that Thelma's dog .       Sharlene Sams    6-2-3 [facility].       s able to understand communication in structured conversations with both familiar and unfamiliar communication partners.         Goals Addressed                 This Visit's Progress    • SLP Motor Goals           NEW /Revised RE=Evaluation: 2023   Goals Short-Long Time Frame Result Comment   Will improve FCM in the following:  MOTOR SPEECH  Current:3-4  Goal:4-5, revised to 5-6  SPOKEN LANG COMP  Current:4  Goal: 5, revised to 6  SPOKEN LANGUAGE EXP:  Current:3  Goal:4, revised to 5 LTG 12 w Met, revise to increase FCM PN: 2023  Motor Speech: 4  Spoken lang comp: 5  Spoken lang expression:4     Pt will improve Aphasia Severity Rating Scale from 2/5 to >/=3/5 LTG 12 w     MOTOR SPEECH:  Provided Auditory bombardment, picture stimulus, direct visual/aud model, pt will produce target words with /p,b,m,w,t,d,n,s,l/ in initial position on first trial, 80% of presentations.  STG 4-6 w Continue with /w,t,d,n,s,l PN: 2023  /p,b,m/ 89% or greater in CVC   Fading cues from above goal: Pt will produce CVCV, VCV and CVC syllables 80% of presentations.  STG 6-8 w Continue  Practice CVCV and VCV PN: 2023  Practice /p,b,m/ to     SPOKEN LANG EXP  Given simple picture cue pt will produce simple sentences of 3-4 words with simplified grammatical form and approximated content words to convey meaning, Min A, 8% of presentations.  STG 6-8 Continue PN:2023  Unscrambled 3 words to describe picture (S)   Pt will produce simple automatics provided mod A:  1-20  A-J  MALLY   STG 6-8 Continue  "PN: 2023  1-10: Improving 50%  MALLY: improving 50%  A-Z: improving with use of words at end of ApraxiaApp, provided direct model for each letter.73%     Pt will produce simple social responses, Mod A:  Name     Address (parts) STG 4-6 MET, revise to (S) PN: 2023  Full name- (S)  : Mod A  Address:Mod A   SPOKEN LANG COMP  The pt will demonstrate understanding of 2 noun in simple and present and progressive reversible sentences F4, 80% accy.   (Advanced language L1-2 Identify)  STG 4-6 w Partially met,  PN: 2023  70%, will continue with goal and practice skills for 3 nouns reversible passive.      Provided body parts (on paper), single letters, digits in 10's/100's place value, pt will demonstrate  understanding of \"point to/touch/pick up___\" commands 85% of trials.  STG  Partially met PN: 2023  Body parts F6: 9/10  10 place value numbers 7/7, F10.    FCM MOTOR SPEECH  L4: In simple structured conversation with familiar communication partners, the individual can produce simple words and phrases intelligibly.  The individual usually requires moderate cueing in order to produce simple sentences intelligibly, although accuracy may vary.         FCM: Spoken Language Expression   L4: The individual is successfully able to initiate communication using spoken language in simple, structured conversation in routine daily activities with familiar communication partners.  The individual usually requires moderate cueing, but is able to demonstrate use of simple sentences (ie, semantics, syntax, and morphology) and rarely uses complex sentences/messages.        FCM: Spoken Language Comprehension  L5: The individual is able to understand communication in structured conversations with both familiar and unfamiliar communication partners.  The individual occasionally requires minimal cueing to understand more complex sentences/messages.  The individual occasionally initiates the use of compensatory " strategies when encountering difficulty.                Time Frame  Result  Comment/Progress    Will improve FCM in the following:  MOTOR SPEECH  Current:3  Goal:4-5  SPOKEN LANG COMP  Current:2  Goal: 2-3 LTG- 12 w Partially Met RE:: 2023  MOTOR SPEECH: 3, emerging to 4    SPOKEN LANG COMP:  4     Demonstrate effective speech intelligibility to communicate at single word and functional phrase level with (min A) use of compensatory strategies with familiar listeners  LTG- 12 w MET RE:: 2023  Aphasia Severity scale, 2/5     Pt will produce bilabials /p, b, m, w/, tip alveolars /t, d, n/ and tense vowels /a, e, I, o,u/ in isolation provided mod A, 80% of trials.  STG-4w   Partially Met  RE:: 2023  Isolation.  Provided direct model with visual cues pt can produce, but inconsistent due to difficulty understanding need to imitate.   Vowels: MET   Pt will produce VC and CV syllables provided the above phonemes, Mod A, 80% of trials STG- 6-8 w   Partially Met  RE:: 2023  Benefits from visual picture, direct model and visual cues.    Pt will produce single and bisyllablic words consisting of CVCV, VCV and CVC, Mod A, 80% of presentations.  STG 8-10   Partially Met  RE:: 2023  Benefits from visual picture, direct model and visual cues and initial phonemic cue for 88% accuracy.   Pt will produce simple automatics provided mod A:  1-10  A-G  Yolis and identifiable syllable combinations for MALLY  STG- 8-10  Partially Met, continue with goal  RE:: 2023  1-10, S--min A  A-/6 min A  Mon-Sun- Min A    Yolis- MET      Pt will produce small set of functional words/phrases in unison or in imitation with SLP, then produce 3  successive attempts with 80% accuracy  STG 8-10   Not Met, improving  RE:: 2023  75% provided DM, fade cues.  Improving with reading from flash cards. Pt has not been able to perform 3 productions.    Auditory Comprehension and reading to be further assessed for  "determination of goals to support speech production    GOAL: 8/25/2023  Given direct model, repetition and NV reinforcement, Pt will follow 1-step commands for motor speech production up to 1-2 syllable words, 80% of presentations.     The pt will demonstrate understanding of 1 noun in simple and present and progressive sentences F4, 80% accy.   (Advanced language L1-2 Identify)                 4-6 w              MET          MET            New  10/17/23              PN: 10/17/2023          RE: 11/14/2023  Present progressive: 100%  2 nouns [id number]%  NEXT 2 nouns reversible.    GOAL: 8/25/2023  Provided pictures/objects, and/or body parts, pt will demonstrate  understanding of \"point to___\" and \" ___\" 85% of trials.     8 w        Partially Met          RE:: 11/14/2023  Pictures and objects,    F4:100%  Body parts: 80%   NEW:   Pt will answer Y/N questions to general knowledge questions 90% of presentations.  New: 10/17/2023 Met RE:: 11/14/2023                       "

## 2023-12-15 NOTE — OP OT TREATMENT LOG
OT NEURO FLOW SHEET    EXERCISES  Initial Evaluation  Progress Note 1  Progress Note 2  Re-evaluation CURRENT SESSION  8/16/2023  9/14/2023  10/23/23  11/14/23 TIME   NEURO RE-ED  CPT 68094 TOTAL TIME FOR SESSION 38-52 Minutes   AAROM/AROM     Strengthening      Strength Re-assessed for progress note    Gross Motor Control Re-assessed for progress note    Fine Motor Control Re-assessed for Progress note    Patient performed fine motor control exercise with towel for grasp and hold, and goal-directed coordination    Patient participated in hand tool use with tongs and weighted spoon to capture various tabletop items    Sitting Jennifer/Balance     Standing Jennifer/Balance     Sensory re-education     Graded motor imagery      Retraining     THERE ACT  CPT 77475'  TOTAL TIME FOR SESSION 8-22 Minutes   Pain, Vitals, Meds, Etc. Vitals taken, pain assessed/monitored, Rest breaks provided for frustration management    Assessment     HEP  12/15/23: Added towel grasp and fold exercise, right hand only; patient expressed good understanding.    11/2/23: Review pinch strengthening positions today for improved carry over    10/26/23: Added soft theraputty for right hand pinch (lateral pinch, tip pinch, 3 jaw patti); patient expressed good understnading    Discussed carry over with use of SF splint    9/28/23: Laterality exercise added to HEP; patient and caregiver expressed good understanding    Right hand stretching    9/21/2023:   -Educated on use of rice/bean sensory bin, massage, sensory re-education with deep pressure and textures. Issued tubigrip for RUE sensory re-education and advised to wear 1x per day. Continued to discuss edema management.     Functional Mobility     Transfers     THERE EX  CPT 35039 TOTAL TIME FOR SESSION 0-7 Minutes   PROM     Activity Tolerance     SELF-CARE  CPT 86991 TOTAL TIME FOR SESSION 0-7 Minutes   Pt Education/Safety     ADL Training     IADL Training     MODALITIES  CPT 06690 TOTAL TIME  FOR SESSION Not performed   Ice/Heat     ATTENDED E-STIM  CPT 60116 TOTAL TIME FOR SESSION Not performed   Attended E-Stim     SPLINTING  CPT 60950 TOTAL TIME FOR SESSION Not performed   Fabrication/Check Out     Fit     Training     GROUP  CPT 47071 TOTAL TIME FOR SESSION Not performed             Normative Range for Female 75+ Years of Age       Right Hand   Left Hand      Initial Evaluation Progress Note 1 Norm Initial Evaluation Progress Note 1 Norm    Strength 0 lbs 0 lbs 42.6 lbs 30 lbs 30 lbs 37.6 lbs   Lateral Pinch N/T 2 lbs 12.6 lbs N/T 14 lbs 11.4 lbs   Three Jaw Yonatan N/T 2 lbs (assist for position) 12 lbs N/T 11 lbs 11.5 lbs   Tip Pinch N/T N/T 9.6 lbs N/T 9 lbs 9.3 lbs   Box and Block 4 blocks 22 blocks 65 blocks 50 blocks 56 blocks 63.6 blocks   Nine Hole Peg (71+ age) Unable to complete Unable to complete 22.49 seconds N/T 24.55 seconds 24.11 seconds

## 2023-12-15 NOTE — PROGRESS NOTES
Occupational Therapy Progress Note    OT PROGRESS NOTE FOR OUTPATIENT THERAPY    Patient: Jennifer Sams MRN: [id number]  : 1945 78 y.o.  Referring Physician: Liv Haywood, Debra  Date of Visit: 2023      Certification Dates:   23 through 24    Recommended Frequency & Duration:  2 times/week for up to 3 months        Diagnosis:   1. Acute ischemic left MCA stroke (CMS/HCC)    2. Lack of coordination        Chief Complaints:  No chief complaint on file.      Precautions:       TODAY'S VISIT:    Time In Session:  Start Time: 1700  Stop Time: 1755  Time Calculation (min): 55 min   General Information - 23 1704        Session Details    Document Type progress note        General Information    Onset of Illness/Injury or Date of Surgery 23     Referring Physician Dr. Haywood     History of present illness/functional impairment The patient is a 78-year-old  female with a history of AAA, heart block s/p cardiac pacemaker, glaucoma, hyperlipidemia, coronary artery disease, NSTEMI, who presented to [facility] on  after she has not been seen by her friends for a few days.  Patient lives alone and when she was found by EMS she was confused and combative.  In the emergency department she was aphasic with right-sided weakness and hypertensive to the 190 systolic.  CT scan of the head revealed an acute infarct in left MCA with mild bleeding.  Echo showed an EF of 43% and a bubble study was negative.  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  Later this was transitioned to apixaban.  She was continued on statin for secondary prevention.  She required virtual shivani while in the hospital but this was not effective.  She was evaluated by speech therapy and cleared for a soft and bite-size diet with moderately thick liquids.'     Patient/Family/Caregiver Comments/Observations  Patient reports difficulty with eating especially with loose foods (e.g. pasta)         Services    Do You Speak a Language Other Than English at Home? no                  Pain/Vitals - 12/14/23 1704        Pain Assessment    Currently in pain No/Denies        Pre Activity Vital Signs    /78     BP Location Left upper arm     BP Method Manual     Patient Position Sitting                OT - 12/14/23 1704        Occupational Therapy Encounter Type Details    Occupational Therapy Specialty Traditional Neuro Program OT        OT Frequency and Duration    Frequency of treatment 2 times/week     OT Duration 3 months     OT Cert From 11/14/23     OT Cert To 02/12/24     Date OT POC was sent to provider 11/14/23     Signed OT Plan of Care received?  Yes                Assessment - 12/15/23 0724        Assessment    Plan of Care reviewed and patient/family in agreement Yes     System Pathology/Pathophysiology Noted neuromuscular     Functional Limitations in Following Categories self-care;home management;community/leisure     Problem List: Occupational Therapy coordination impaired;impaired cognition;decreased flexibility;sensation decreased     Rehab Potential/Prognosis: Occupational Therapy good, to achieve stated therapy goals     Clinical Assessment Patient was seen in OP OT for improved functional use of RUE s/p CVA and progress evaluation was conducted today. Patient was seen for initial OT evaluation on 8/16/2023 and re-evaluated on 11/14/23, she has participated in 22 Occupational Therapy sessions, including this visit. At initial evaluation she presented with deficits related to right hand range of motion, fine motor coordination at RUE, right hand dexterity, right hand  and pinch strength and performance with ADLs and IADLs. The following objective measures were administered today: Patient able to grasp and place 3 pegs in pegboard during allotted 3 minutes of 9 hole pegboard (baseline: 0  pegs) indicating improving fine motor coordination but with continued limitation at right hand, Box and Block test score is decreased at 24 blocks (re-eval: 28),  strength at right hand is 10 lbs average (re-eval: 6 lbs) and pinch strength measurements have all increased. Subjective discussion of ADL/IADL performance limited due to apasia however therapist observes increased functional use of RUE as with carrying coat today, indicating increased ability to performing basic dual tasking. Per results of progress evaluation patient with improving strength at right hand, and can benefit from increased participation in fine motor control tasks with HEP for improved dexterity; she indicates good understanding. Recommend continued participation in OP OT 2x/week for 3 months.     Plan and Recommendations Fine motor control, coordination, strengthening at RUE; explore opportunities for purposeful occupational engagement                 OBJECTIVE MEASUREMENTS/DATA:    Gross and Fine Motor     Gross and Fine Motor - 12/14/23 1712        Hand  Strength Testing    Right Hand, Setting 2 9/10/11; Average = 10 lbs     Left Hand: Lateral (Key) Pinch Strength --     Right Hand: Tip (Pincer) Pinch Strength 2 lbs     Right Hand: Lateral (Key) Pinch Strength 6 lbs     Right Hand: Three Point (Yonatan) Pinch Strength 5 lbs               Outcome Measures    Outcome Measures - 12/14/23 1712        Objective Outcome Measures    RIGHT hand: Box and Blocks Assessment 24     LEFT hand: Box and Blocks Assessment --     9 Hole Peg Test - RIGHT HAND TIME 180   3 pegs placed       Other Outcome Measures Used/Comments    Outcome Measures General Comments MF to DPC: 1.5 cm                 ROM and MMT        9/14/2023    18:07 11/28/2023    16:29   OT UE MMT   Right Shoulder Flexion (4+/5) good plus    Left Shoulder Flexion (4+/5) good plus (4+/5) good plus   Right Shoulder Extension (4+/5) good plus    Left Shoulder Extension (4+/5) good plus  (4+/5) good plus   Right Shoulder ADD (4+/5) good plus    Left Shoulder ADD (4+/5) good plus (4+/5) good plus   Right Shoulder ABD (4+/5) good plus    Left Shoulder ABD (4+/5) good plus (4+/5) good plus   Right Shoulder IR (3+/5) fair plus    Left Shoulder IR (4+/5) good plus (4+/5) good plus   Right Shoulder ER (4/5) good    Left Shoulder ER (4+/5) good plus (4+/5) good plus   Right Elbow Flexion (4+/5) good plus    Left Elbow Flexion (4+/5) good plus (4+/5) good plus   Right Elbow Extension (4+/5) good plus    Left Elbow Extension (4+/5) good plus (4+/5) good plus   Right Forearm Supination (4/5) good    Left Forearm Supination (4+/5) good plus (4+/5) good plus   Right Forearm Pronation (4/5) good    Left Forearm Pronation (4+/5) good plus (4+/5) good plus   Right Wrist Flexion (3+/5) fair plus    Left Wrist Flexion (4/5) good (4/5) good   Right Wrist Extension (3+/5) fair plus    Left Wrist Extension (4/5) good (4/5) good   Right Wrist UD (2/5) poor    Left Wrist UD (4/5) good (4/5) good   Right Wrist RD (2/5) poor    Left Wrist RD (4/5) good (4/5) good     Outcome Measures        9/21/2023    16:29 10/24/2023    14:18 10/26/2023    14:01 11/2/2023    13:20 11/14/2023    14:20 12/14/2023    17:12   OT OBJECTIVE Outcome Measures   9 Hole Peg Test - Right Hand   48.6       Removing pegs only 30.88       peg removal only 16.65       Removing only using lateral pinch 180       3 pegs placed   9 Hole Peg Test - Left Hand     24.06    Right Hand Box and Blocks  18   53 24   Left Hand Box and Blocks     28    Other Outcome Measure Right hand edema    Right MF to DPC = 1.5 cm        Today's Treatment::    Education provided:  Yes: See treatment log for details of education provided  Methods: Discussion and Demonstration  Readiness: acceptance and eager  Response: Demonstrated understanding      OT NEURO FLOW SHEET    EXERCISES  Initial Evaluation  Progress Note 1  Progress Note 2  Re-evaluation CURRENT  SESSION  8/16/2023  9/14/2023  10/23/23  11/14/23 TIME   NEURO RE-ED  CPT 98959 TOTAL TIME FOR SESSION 38-52 Minutes   AAROM/AROM     Strengthening      Strength Re-assessed for progress note    Gross Motor Control Re-assessed for progress note    Fine Motor Control Re-assessed for Progress note    Patient performed fine motor control exercise with towel for grasp and hold, and goal-directed coordination    Patient participated in hand tool use with tongs and weighted spoon to capture various tabletop items    Sitting Jennifer/Balance     Standing Jennifer/Balance     Sensory re-education     Graded motor imagery      Retraining     THERE ACT  CPT 25751'  TOTAL TIME FOR SESSION 8-22 Minutes   Pain, Vitals, Meds, Etc. Vitals taken, pain assessed/monitored, Rest breaks provided for frustration management    Assessment     HEP  12/15/23: Added towel grasp and fold exercise, right hand only; patient expressed good understanding.    11/2/23: Review pinch strengthening positions today for improved carry over    10/26/23: Added soft theraputty for right hand pinch (lateral pinch, tip pinch, 3 jaw patti); patient expressed good understnading    Discussed carry over with use of SF splint    9/28/23: Laterality exercise added to HEP; patient and caregiver expressed good understanding    Right hand stretching    9/21/2023:   -Educated on use of rice/bean sensory bin, massage, sensory re-education with deep pressure and textures. Issued tubigrip for RUE sensory re-education and advised to wear 1x per day. Continued to discuss edema management.     Functional Mobility     Transfers     THERE EX  CPT 23476 TOTAL TIME FOR SESSION 0-7 Minutes   PROM     Activity Tolerance     SELF-CARE  CPT 76696 TOTAL TIME FOR SESSION 0-7 Minutes   Pt Education/Safety     ADL Training     IADL Training     MODALITIES  CPT 20246 TOTAL TIME FOR SESSION Not performed   Ice/Heat     ATTENDED E-STIM  CPT 30000 TOTAL TIME FOR SESSION Not performed    Attended E-Stim     SPLINTING  CPT 99299 TOTAL TIME FOR SESSION Not performed   Fabrication/Check Out     Fit     Training     GROUP  CPT 25681 TOTAL TIME FOR SESSION Not performed             Normative Range for Female 75+ Years of Age       Right Hand   Left Hand      Initial Evaluation Progress Note 1 Norm Initial Evaluation Progress Note 1 Norm    Strength 0 lbs 0 lbs 42.6 lbs 30 lbs 30 lbs 37.6 lbs   Lateral Pinch N/T 2 lbs 12.6 lbs N/T 14 lbs 11.4 lbs   Three Jaw Yonatan N/T 2 lbs (assist for position) 12 lbs N/T 11 lbs 11.5 lbs   Tip Pinch N/T N/T 9.6 lbs N/T 9 lbs 9.3 lbs   Box and Block 4 blocks 22 blocks 65 blocks 50 blocks 56 blocks 63.6 blocks   Nine Hole Peg (71+ age) Unable to complete Unable to complete 22.49 seconds N/T 24.55 seconds 24.11 seconds            Goals Addressed                 This Visit's Progress    • OT Neuro/Deconditioning Goals          Short Term Goals Time Frame Result Comment/Progress   Assess gross motor control via Box and Block Assessment  4 weeks Met 9/14: R: 22; L: 56   Assess fine motor control via 9 hole peg test 4 weeks Met  9/14: R: Unable; L: 24.55   Improved automatic fine motor use at right hand with routine tasks (e.g. eating, writing, brushing teeth) to 25% of baseline  4 weeks Ongoing     Explore adaptive visual strategies to encourage looking to right side 4 weeks Met    Incorporate right hand in at least 2 self-care activities 4 weeks Met    Carry over HEP at least 3 times per week  4 weeks Met     Score at least 32 on Box and block test 4 weeks New goal    Increase right hand  strength to 8 lbs 4 weeks Met       Long Term Goals Time Frame Result Comment/Progress   Improve gross motor control as evidenced by at least 10 block improved at RUE with Box and Block Assessment for improved ability to perform ADLs and IADLs  12 weeks Met     Patient will complete 9 hole peg test in under 3 minutes 12 weeks Ongoing    Decrease distance from Right MF to DPC to 2  cm for improved functional grasp at right hand 12 weeks Met     Improved automatic fine motor use at right hand with routine tasks (e.g. eating, writing, brushing teeth) to 75% of baseline 12 weeks Ongoing     Increase right hand  strength to at least 15 lbs 12 weeks New goal    Increase Box and Block score to at least 40 12 weeks New goal

## 2023-12-19 ENCOUNTER — HOSPITAL ENCOUNTER (OUTPATIENT)
Dept: OCCUPATIONAL THERAPY | Age: 78
Setting detail: THERAPIES SERIES
Discharge: HOME | End: 2023-12-19
Attending: PHYSICAL MEDICINE & REHABILITATION
Payer: MEDICARE

## 2023-12-19 ENCOUNTER — HOSPITAL ENCOUNTER (OUTPATIENT)
Dept: SPEECH THERAPY | Age: 78
Setting detail: THERAPIES SERIES
Discharge: HOME | End: 2023-12-19
Attending: PHYSICAL MEDICINE & REHABILITATION
Payer: MEDICARE

## 2023-12-19 DIAGNOSIS — I63.512 ACUTE ISCHEMIC LEFT MCA STROKE (CMS/HCC): Primary | ICD-10-CM

## 2023-12-19 DIAGNOSIS — I63.9 CEREBROVASCULAR ACCIDENT (CVA), UNSPECIFIED MECHANISM (CMS/HCC): Primary | ICD-10-CM

## 2023-12-19 DIAGNOSIS — I63.512 ACUTE ISCHEMIC LEFT MCA STROKE (CMS/HCC): ICD-10-CM

## 2023-12-19 DIAGNOSIS — M62.81 MUSCLE WEAKNESS (GENERALIZED): ICD-10-CM

## 2023-12-19 DIAGNOSIS — R27.9 LACK OF COORDINATION: ICD-10-CM

## 2023-12-19 PROCEDURE — 97110 THERAPEUTIC EXERCISES: CPT | Mod: GO,KX,59

## 2023-12-19 PROCEDURE — 92507 TX SP LANG VOICE COMM INDIV: CPT | Mod: GN

## 2023-12-19 PROCEDURE — 97112 NEUROMUSCULAR REEDUCATION: CPT | Mod: GO,KX,59

## 2023-12-19 NOTE — PROGRESS NOTES
Speech-Language Pathology Visit      SLP DAILY NOTE FOR OUTPATIENT THERAPY    Patient: Jennifer Sams   MRN: [id number]  : 1945 78 y.o.  Referring Physician: Liv Haywood, Debra  Date of Visit: 2023      Certification Dates: 23 through 24    Diagnosis:   1. Cerebrovascular accident (CVA), unspecified mechanism (CMS/HCC)    2. Acute ischemic left MCA stroke (CMS/HCC)        Chief Complaints:  apraxia    Precautions:  Precautions Comments: aphasia, apraxia, pacemaker       TODAY'S VISIT:    Session Time:  Start Time: 1700  Stop Time: 1800  Time Calculation (min): 60 min   History/Vitals/Pain/Encounter Info - 23        Injury History/Precautions/Daily Required Info    Primary Therapist Magnolia Lundy MS,CCC/SLP     Chief Complaint/Reason for Visit  apraxia     Onset of Illness/Injury or Date of Surgery 23     Referring Physician Dr. Wang     Precautions Comments aphasia, apraxia, pacemaker     Limitations/Impairments safety/cognitive     Document Type daily treatment     Patient reported fall since last visit No        Pain Assessment    Currently in pain No/Denies                Daily Treatment Assessment and Plan - 23        Daily Treatment Assessment and Plan    Progress toward goals Progressing     Daily Outcome Summary Difficulty producing /d/ this date.  improved CVC with auditory bombardment.  Perseverations noted in alphabet b,c,d,e,g,     Plan and Recommendations Auditory bombardment and practice for /p,m,b/ CVCV and VCV and ,w,t,d,n,s,l in CVC .  Continue with advanced language ted. Practice reading apps when time permits.                  OBJECTIVE MEASUREMENTS TAKEN TODAY:    None Taken    Today's Treatment:    LANGUAGE SLP FLOW SHEET    SKILL AREA CURRENT SESSION   SPEECH THERAPY: LANGUAGE  CPT 96583    MOTOR SPEECH:  Provided Auditory bombardment, picture stimulus, direct visual/aud model, pt will produce target words with  /p,b,m,w,t,d,n,s,l/ in initial position on first trial, 80% of presentations.  2023  Aud bombardment CVC /d/  9/10 max A    2023  CVC  /b/ 10/11  /p/ 10/10  /m/ 2023  Practiced target sounds in numbers and letters.   Min cues for /p, b, m, w, s/  Mod A for /t, d, n, l/    2023  Auditory bombardment, picture, visual stimulus  /p,b/ CVC and CV words. 20 words with cues above.  Pt benefited from anticipation of looking at therapist face.      2023  Auditory bombardment, picture, visual stimulus  /p,b/ CVC and CV words. 20 words with cues above.    Practiced letter names: a,c, k, t, p,  d, ,m, j, u      Fading cues from above goal: Pt will produce CVCV, VCV and CVC syllables 80% of presentations.  2023  Naming kristel  word approximations provided clinician model.    2023  Practiced /M,b,p/ in CV and CVC single syllables. Pt continues to benefit from a prep/anticipatory set for initial phoneme placement prior to the direct model.    2023  VC    SPOKEN LANG EXP  Given simple picture cue pt will produce simple sentences of 3-4 words with simplified grammatical form and approximated content words to convey meaning, Min A, 8% of presentations.  2023  Pt visually unscrambled 3 words to describe a picture on Advanced Comprehension kristel build- Noun- Simple Sentences.   Pt will produce simple automatics provided mod A:  1-20  A-J  MALLY 2023  Apraxia Kristel  1-10:8/10  MALLY:  0, improved with therapist isolating and repeating   A-J: 7/10 Mod to max A.    2023  Apraxia Kristel:  1-10: Improving  MALLY: improving  A-Z: improving with use of words at end of ApraxiaApp       Apraxia kristel  1-10:  1-5  MALLY: very difficult      Pt will produce simple social responses, Mod A:  Name     Address (parts) 2023  Confrontational questions:pt produced-full name, (S),  Mod-Max A, Address Mod to Max A.     2023  Confrontational questions:pt  "produced-full name, (S),  Mod A     2023  Flashcards:   Pt says full name,  address, Mod to max A    2023  Pt produced first and last name,  Cues to produce Month, date, and year of , and address.      2023  Flash cards- Pt    SPOKEN LANG COMP  The pt will demonstrate understanding of 2 noun in simple and present and progressive reversible sentences F4, 80% accy.   (Advanced language L1-2 Identify) 2023  L 5-7: 8/10, L6-7:  2 nouns, non-reversible passive   2 nouns reversible passive  /3, 2/, 10  3 nouns non-reversible 2/3, 4/5    2023  F4  L1-4: 8/10 (quick)  L5: 9/10 2 nouns, non-reversible passive  L6: 7/10 2 nouns reversible passive  L7: 9/10, 3 nouns non-reversible    2023 Field of 2- next session increase to F4  L1: 10/10 (quick)   L2: 10/10 (quick)   L3: 10/10   L4: 10/10   L1-4: 10/10     2023  L1: 7/10, 10/10  L1-2: 9/10  L1-3: 7/10    2023  L1-4, 33%, 33%, 100%, 50%  L1-2: 80%, 100%   L1: 90%    Provided body parts (on paper), single letters, digits in 10's/100's place value, pt will demonstrate  understanding of \"point to/touch/pick up___\" commands 85% of trials. 2023  Tactus Language Kristel  Body parts F6: 9/10    2023  Tactus Language Kristel  Body parts F6: 9/10  10 place value numbers , F10.      2023  Tactus Language Kristel  Body parts F4: 9/10    2023  Letter ID: F4 7/10    2023  Letter ID: F4 5/10     Education/Strategy Training 2023  Pt expectation was discussed this date that pt is expected to look at therapist mouth and produce vocal attempt.     Other 2023  F4 Matching 2 word phrases to single picture: 8/10  Phrase completion: F2- 9/10    2023  Pt reports losing weight because unable to cook and read cook books,  Not enjoying food people bring.    2023  Pt was able to share that Thelma's dog .       Sharlene Sams    6-2-3 [facility] [place].       s able to " understand communication in structured conversations with both familiar and unfamiliar communication partners.

## 2023-12-19 NOTE — OP OT TREATMENT LOG
OT NEURO FLOW SHEET    EXERCISES  Initial Evaluation  Progress Note 1  Progress Note 2  Re-evaluation CURRENT SESSION  8/16/2023  9/14/2023  10/23/23  11/14/23 TIME   NEURO RE-ED  CPT 16198 TOTAL TIME FOR SESSION 38-52 Minutes   AAROM/AROM     Strengthening      Strength Right lateral pinch strengthening using light resistance theraputty x 20 repetitions with minimal cues for form.     Gross Motor Control RUE punching to target through all planes up to 120 degrees shoulder flexion with visual feedback required for accuracy x 20 repetitions.     Fine Motor Control Engaged in fine motor task using over top approach and three jaw patti to  and place medium wood pegs in board with 25% accuracy. Would benefit from rubber finger pads to promote .     Bilateral hand task to connect and disconnect medium pegs x 10 with right hand in primary role with moderate cues and 70% accuracy.     Sitting Jennifer/Balance     Standing Jennifer/Balance     Sensory re-education Right hand sensory re-education with deep pressure x 5 minutes in preparation for grasping activity.     Graded motor imagery      Retraining     THERE ACT  CPT 94979'  TOTAL TIME FOR SESSION 8-22 Minutes   Pain, Vitals, Meds, Etc. pain assessed/monitored,     Assessment     HEP  12/15/23: Added towel grasp and fold exercise, right hand only; patient expressed good understanding.    11/2/23: Review pinch strengthening positions today for improved carry over    10/26/23: Added soft theraputty for right hand pinch (lateral pinch, tip pinch, 3 jaw patti); patient expressed good understnading    Discussed carry over with use of SF splint    9/28/23: Laterality exercise added to HEP; patient and caregiver expressed good understanding    Right hand stretching    9/21/2023:   -Educated on use of rice/bean sensory bin, massage, sensory re-education with deep pressure and textures. Issued tubigrip for RUE sensory re-education and advised to wear 1x per day.  Continued to discuss edema management.     Functional Mobility     Transfers     THERE EX  CPT 85717 TOTAL TIME FOR SESSION 8-22 Minutes   PROM Right hand flexor and extensor prolonged stretching with gentle soft tissue mobilization x 5 minutes. Patient then engaged in composite full fist grasp with coban applied for prolonged stretch to promote IF flexion x 5 minutes of wear with minimal improvement appreciated.     Activity Tolerance     SELF-CARE  CPT 65254 TOTAL TIME FOR SESSION Not performed   Pt Education/Safety     ADL Training     IADL Training     MODALITIES  CPT 91029 TOTAL TIME FOR SESSION Not performed   Ice/Heat     ATTENDED E-STIM  CPT 50470 TOTAL TIME FOR SESSION Not performed   Attended E-Stim     SPLINTING  CPT 31879 TOTAL TIME FOR SESSION Not performed   Fabrication/Check Out     Fit     Training     GROUP  CPT 00653 TOTAL TIME FOR SESSION Not performed             Normative Range for Female 75+ Years of Age       Right Hand   Left Hand      Initial Evaluation Progress Note 1 Norm Initial Evaluation Progress Note 1 Norm    Strength 0 lbs 0 lbs 42.6 lbs 30 lbs 30 lbs 37.6 lbs   Lateral Pinch N/T 2 lbs 12.6 lbs N/T 14 lbs 11.4 lbs   Three Jaw Yonatan N/T 2 lbs (assist for position) 12 lbs N/T 11 lbs 11.5 lbs   Tip Pinch N/T N/T 9.6 lbs N/T 9 lbs 9.3 lbs   Box and Block 4 blocks 22 blocks 65 blocks 50 blocks 56 blocks 63.6 blocks   Nine Hole Peg (71+ age) Unable to complete Unable to complete 22.49 seconds N/T 24.55 seconds 24.11 seconds

## 2023-12-19 NOTE — PROGRESS NOTES
Occupational Therapy Visit    OT DAILY NOTE FOR OUTPATIENT THERAPY    Patient: Jennifer Sams   MRN: [id number]  : 1945 78 y.o.  Referring Physician: Liv Haywood, Debra  Date of Visit: 2023      Certification Dates: 23 through 24    Diagnosis:   1. Acute ischemic left MCA stroke (CMS/HCC)    2. Lack of coordination    3. Muscle weakness (generalized)        Chief Complaints:   Brain Injury    Precautions:       TODAY'S VISIT    Time In Session:  Start Time: 1602  Stop Time: 1700  Time Calculation (min): 58 min   History/Vitals/Pain/Encounter Info - 23 1601        Injury History/Precautions/Daily Required Info    Document Type daily treatment     Primary Therapist Barry Rosas OTR/CARYN     Chief Complaint/Reason for Visit  Brain Injury     Onset of Illness/Injury or Date of Surgery 23     Referring Physician Dr. Haywood     History of present illness/functional impairment The patient is a 78-year-old  female with a history of AAA, heart block s/p cardiac pacemaker, glaucoma, hyperlipidemia, coronary artery disease, NSTEMI, who presented to [facility] on  after she has not been seen by her friends for a few days.  Patient lives alone and when she was found by EMS she was confused and combative.  In the emergency department she was aphasic with right-sided weakness and hypertensive to the 190 systolic.  CT scan of the head revealed an acute infarct in left MCA with mild bleeding.  Echo showed an EF of 43% and a bubble study was negative.  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  Later this was transitioned to apixaban.  She was continued on statin for secondary prevention.  She required virtual shivani while in the hospital but this was not effective.  She was evaluated by speech therapy and cleared for a soft and bite-size diet with moderately thick liquids.'   "   Patient/Family/Caregiver Comments/Observations Patient denies pain and states she is \"just old\".     Patient reported fall since last visit No        Pain Assessment    Currently in pain No/Denies     Pain Side/Orientation right     Pain: Body location Hand   Index finger    Word Pain Rating: Activity 2 - mild pain     Pain Rating (word): Post Activity 0 - no pain     FACES Pain Rating: Rest 0-->no hurt        Pain Interventions    Intervention  Pain with pressure to index finger 1st phalange and MCP. Resolves with removal of pressure.     Post Intervention Comments No pain at conclusion        Pre Activity Vital Signs    /65     BP Location Right upper arm     BP Method Manual     Patient Position Sitting         Services    Do You Speak a Language Other Than English at Home? no                Daily Treatment Assessment and Plan - 12/19/23 1823        Daily Treatment Assessment and Plan    Progress toward goals Progressing     Daily Outcome Summary Patient with right hand redness at all MCP's and slight edema appreciated. Right IF full flexion is limited with minimal improvement after soft tissue mobilization and prolonged composite stretch. Fine motor coordination continues to be limited due to sensory changes, but progress is anticipated given rubber finger pads to promote friction with . Patient requires visual demonstration for all tasks due to persistent aphasia.     Plan and Recommendations Fine motor control, coordination, strengthening at RUE; explore opportunities for purposeful occupational engagement                     OBJECTIVE DATA TAKEN TODAY    None Taken    Today's Treatment:      OT NEURO FLOW SHEET    EXERCISES  Initial Evaluation  Progress Note 1  Progress Note 2  Re-evaluation CURRENT SESSION  8/16/2023  9/14/2023  10/23/23  11/14/23 TIME   NEURO RE-ED  CPT 13140 TOTAL TIME FOR SESSION 38-52 Minutes   AAROM/AROM     Strengthening      Strength Right lateral pinch " strengthening using light resistance theraputty x 20 repetitions with minimal cues for form.     Gross Motor Control RUE punching to target through all planes up to 120 degrees shoulder flexion with visual feedback required for accuracy x 20 repetitions.     Fine Motor Control Engaged in fine motor task using over top approach and three jaw patti to  and place medium wood pegs in board with 25% accuracy. Would benefit from rubber finger pads to promote .     Bilateral hand task to connect and disconnect medium pegs x 10 with right hand in primary role with moderate cues and 70% accuracy.     Sitting Jennifer/Balance     Standing Jennifer/Balance     Sensory re-education Right hand sensory re-education with deep pressure x 5 minutes in preparation for grasping activity.     Graded motor imagery      Retraining     THERE ACT  CPT 41848'  TOTAL TIME FOR SESSION 8-22 Minutes   Pain, Vitals, Meds, Etc. pain assessed/monitored,     Assessment     HEP  12/15/23: Added towel grasp and fold exercise, right hand only; patient expressed good understanding.    11/2/23: Review pinch strengthening positions today for improved carry over    10/26/23: Added soft theraputty for right hand pinch (lateral pinch, tip pinch, 3 jaw patti); patient expressed good understnading    Discussed carry over with use of SF splint    9/28/23: Laterality exercise added to HEP; patient and caregiver expressed good understanding    Right hand stretching    9/21/2023:   -Educated on use of rice/bean sensory bin, massage, sensory re-education with deep pressure and textures. Issued tubigrip for RUE sensory re-education and advised to wear 1x per day. Continued to discuss edema management.     Functional Mobility     Transfers     THERE EX  CPT 15324 TOTAL TIME FOR SESSION 8-22 Minutes   PROM Right hand flexor and extensor prolonged stretching with gentle soft tissue mobilization x 5 minutes. Patient then engaged in composite full fist grasp with  coban applied for prolonged stretch to promote IF flexion x 5 minutes of wear with minimal improvement appreciated.     Activity Tolerance     SELF-CARE  CPT 69278 TOTAL TIME FOR SESSION Not performed   Pt Education/Safety     ADL Training     IADL Training     MODALITIES  CPT 00162 TOTAL TIME FOR SESSION Not performed   Ice/Heat     ATTENDED E-STIM  CPT 41318 TOTAL TIME FOR SESSION Not performed   Attended E-Stim     SPLINTING  CPT 53228 TOTAL TIME FOR SESSION Not performed   Fabrication/Check Out     Fit     Training     GROUP  CPT 99303 TOTAL TIME FOR SESSION Not performed             Normative Range for Female 75+ Years of Age       Right Hand   Left Hand      Initial Evaluation Progress Note 1 Norm Initial Evaluation Progress Note 1 Norm    Strength 0 lbs 0 lbs 42.6 lbs 30 lbs 30 lbs 37.6 lbs   Lateral Pinch N/T 2 lbs 12.6 lbs N/T 14 lbs 11.4 lbs   Three Jaw Yonatan N/T 2 lbs (assist for position) 12 lbs N/T 11 lbs 11.5 lbs   Tip Pinch N/T N/T 9.6 lbs N/T 9 lbs 9.3 lbs   Box and Block 4 blocks 22 blocks 65 blocks 50 blocks 56 blocks 63.6 blocks   Nine Hole Peg (71+ age) Unable to complete Unable to complete 22.49 seconds N/T 24.55 seconds 24.11 seconds

## 2023-12-19 NOTE — OP SLP TREATMENT LOG
LANGUAGE SLP FLOW SHEET    SKILL AREA CURRENT SESSION   SPEECH THERAPY: LANGUAGE  CPT 63952    MOTOR SPEECH:  Provided Auditory bombardment, picture stimulus, direct visual/aud model, pt will produce target words with /p,b,m,w,t,d,n,s,l/ in initial position on first trial, 80% of presentations.  12/19/2023  Aud bombardment CVC /d/  9/10 max A    12/14/2023  CVC  /b/ 10/11  /p/ 10/10  /m/ 8/9 12/7/2023  Practiced target sounds in numbers and letters.   Min cues for /p, b, m, w, s/  Mod A for /t, d, n, l/    11/30/2023  Auditory bombardment, picture, visual stimulus  /p,b/ CVC and CV words. 20 words with cues above.  Pt benefited from anticipation of looking at therapist face.      11/28/2023  Auditory bombardment, picture, visual stimulus  /p,b/ CVC and CV words. 20 words with cues above.    Practiced letter names: a,c, k, t, p,  d, ,m, j, u      Fading cues from above goal: Pt will produce CVCV, VCV and CVC syllables 80% of presentations.  12/19/2023 12/7/2023  Naming kristel 18/20 word approximations provided clinician model.    11/30/2023  Practiced /M,b,p/ in CV and CVC single syllables. Pt continues to benefit from a prep/anticipatory set for initial phoneme placement prior to the direct model.    11/28/2023  VC    SPOKEN LANG EXP  Given simple picture cue pt will produce simple sentences of 3-4 words with simplified grammatical form and approximated content words to convey meaning, Min A, 8% of presentations.  12/7/2023  Pt visually unscrambled 3 words to describe a picture on Advanced Comprehension kristel build- Noun- Simple Sentences.   Pt will produce simple automatics provided mod A:  1-20  A-J  MALLY 12/19/2023  Apraxia Kristel  1-10:8/10  MALLY:  0/7, improved with therapist isolating and repeating   A-J: 7/10 Mod to max A.    12/14/2023  Apraxia Kristel:  1-10: Improving  MALLY: improving  A-Z: improving with use of words at end of ApraxiaApp    12/7/12023   Apraxia kristel  1-10:  1-5  MALLY: very difficult      Pt will  "produce simple social responses, Mod A:  Name     Address (parts) 2023  Confrontational questions:pt produced-full name, (S),  Mod-Max A, Address Mod to Max A.     2023  Confrontational questions:pt produced-full name, (S),  Mod A     2023  Flashcards:   Pt says full name,  address, Mod to max A    2023  Pt produced first and last name,  Cues to produce Month, date, and year of , and address.      2023  Flash cards- Pt    SPOKEN LANG COMP  The pt will demonstrate understanding of 2 noun in simple and present and progressive reversible sentences F4, 80% accy.   (Advanced language L1-2 Identify) 2023  L 5-7: 8/10, L6-7:  2 nouns, non-reversible passive /  2 nouns reversible passive  2/3, 2/, 10  3 nouns non-reversible 2/3, 4/5    2023  F4  L1-4: 8/10 (quick)  L5: 9/10 2 nouns, non-reversible passive  L6: 7/10 2 nouns reversible passive  L7: 9/10, 3 nouns non-reversible    2023 Field of 2- next session increase to F4  L1: 10/10 (quick)   L2: 10/10 (quick)   L3: 10/10   L4: 10/10   L1-4: 10/10     2023  L1: 7/10, 10/10  L1-2: 9/10  L1-3: 7/10    2023  L1-4, 33%, 33%, 100%, 50%  L1-2: 80%, 100%   L1: 90%    Provided body parts (on paper), single letters, digits in 10's/100's place value, pt will demonstrate  understanding of \"point to/touch/pick up___\" commands 85% of trials. 2023  [employer] Language Kristel  Body parts F6: 9/10    2023  [employer] Language Kristel  Body parts F6: 9/10  10 place value numbers , F10.      2023  [employer] Language Kristel  Body parts F4: 9/10    2023  Letter ID: F4 7/10    2023  Letter ID: F4 5/10     Education/Strategy Training 2023  Pt expectation was discussed this date that pt is expected to look at therapist mouth and produce vocal attempt.     Other 2023  F4 Matching 2 word phrases to single picture: 8/10  Phrase completion: F2- 9/10    2023  Pt reports losing weight because " unable to cook and read cook books,  Not enjoying food people bring.    2023  Pt was able to share that Thelma's dog .       Sharlene Sams    6-2-3 [facility].       s able to understand communication in structured conversations with both familiar and unfamiliar communication partners.

## 2023-12-28 ENCOUNTER — HOSPITAL ENCOUNTER (OUTPATIENT)
Dept: SPEECH THERAPY | Age: 78
Setting detail: THERAPIES SERIES
Discharge: HOME | End: 2023-12-28
Attending: PHYSICAL MEDICINE & REHABILITATION
Payer: MEDICARE

## 2023-12-28 ENCOUNTER — HOSPITAL ENCOUNTER (OUTPATIENT)
Dept: OCCUPATIONAL THERAPY | Age: 78
Setting detail: THERAPIES SERIES
Discharge: HOME | End: 2023-12-28
Attending: PHYSICAL MEDICINE & REHABILITATION
Payer: MEDICARE

## 2023-12-28 DIAGNOSIS — I63.9 CEREBROVASCULAR ACCIDENT (CVA), UNSPECIFIED MECHANISM (CMS/HCC): Primary | ICD-10-CM

## 2023-12-28 DIAGNOSIS — I63.512 ACUTE ISCHEMIC LEFT MCA STROKE (CMS/HCC): ICD-10-CM

## 2023-12-28 DIAGNOSIS — I63.512 ACUTE ISCHEMIC LEFT MCA STROKE (CMS/HCC): Primary | ICD-10-CM

## 2023-12-28 DIAGNOSIS — M62.81 MUSCLE WEAKNESS (GENERALIZED): ICD-10-CM

## 2023-12-28 DIAGNOSIS — R27.9 LACK OF COORDINATION: ICD-10-CM

## 2023-12-28 PROCEDURE — 92507 TX SP LANG VOICE COMM INDIV: CPT | Mod: GN

## 2023-12-28 PROCEDURE — 97535 SELF CARE MNGMENT TRAINING: CPT | Mod: GO,KX

## 2023-12-28 PROCEDURE — 97112 NEUROMUSCULAR REEDUCATION: CPT | Mod: GO,KX,59

## 2023-12-28 NOTE — OP SLP TREATMENT LOG
LANGUAGE SLP FLOW SHEET    SKILL AREA CURRENT SESSION   SPEECH THERAPY: LANGUAGE  CPT 51121    MOTOR SPEECH:  Provided Auditory bombardment, picture stimulus, direct visual/aud model, pt will produce target words with /p,b,m,w,t,d,n,s,l/ in initial position on first trial, 80% of presentations.  12/28/2023  CVC- repeated consonant   fading cues from auditory bombardment, direct model, picture cue  /b/- Mod A  /m/- Mod A  /p/- Mod A    12/19/2023  Aud bombardment CVC /d/  9/10 max A    12/14/2023  CVC  /b/ 10/11  /p/ 10/10  /m/ 8/9 12/7/2023  Practiced target sounds in numbers and letters.   Min cues for /p, b, m, w, s/  Mod A for /t, d, n, l/    11/30/2023  Auditory bombardment, picture, visual stimulus  /p,b/ CVC and CV words. 20 words with cues above.  Pt benefited from anticipation of looking at therapist face.      11/28/2023  Auditory bombardment, picture, visual stimulus  /p,b/ CVC and CV words. 20 words with cues above.    Practiced letter names: a,c, k, t, p,  d, ,m, j, u      Fading cues from above goal: Pt will produce CVCV, VCV and CVC syllables 80% of presentations.    12/7/2023  Naming ted 18/20 word approximations provided clinician model.    11/30/2023  Practiced /M,b,p/ in CV and CVC single syllables. Pt continues to benefit from a prep/anticipatory set for initial phoneme placement prior to the direct model.    11/28/2023  VC    SPOKEN LANG EXP  Given simple picture cue pt will produce simple sentences of 3-4 words with simplified grammatical form and approximated content words to convey meaning, Min A, 80% of presentations.    12/28/2023  2-5 words unscramble to photograph.  90%:  1 noun, simple sentence 2/2  1 pronoun, simple sentence 2/2  1 noun present progressive 1/2  1 pronoun, present progressive 2/2  2 nouns, non-reversible 2/2    12/7/2023  Pt visually unscrambled 3 words to describe a picture on Advanced Comprehension ted build- Noun- Simple Sentences.   Pt will produce simple  automatics provided mod A:  1-20  A-J  MALLY 2023  Unison: 1-10,   Fading unison: 1-10  Unison: 1-14   Unison: A-J, cues to look at therapist's lips   MALLY: one at a time with direct model    2023  Apraxia Kristel  1-10:8/10  MALLY:  0/, improved with therapist isolating and repeating   A-J: 7/10 Mod to max A.    2023  Apraxia Kristel:  1-10: Improving  MALLY: improving  A-Z: improving with use of words at end of ApraxiaApp       Apraxia kristel  1-10:  1-5  MALLY: very difficult      Pt will produce simple social responses, Mod A:  Name     Address (parts) 2023  Confrontational questions:pt produced-full name, (S),  Mod-Max A, Address Mod to Max A.     2023  Confrontational questions:pt produced-full name, (S),  Mod A     2023  Flashcards:   Pt says full name,  address, Mod to max A    2023  Pt produced first and last name,  Cues to produce Month, date, and year of , and address.      2023  Flash cards- Pt    SPOKEN LANG COMP  The pt will demonstrate understanding of 2 noun in simple and present and progressive reversible sentences F4, 80% accy.   (Advanced language L1-2 Identify) 2023  L6-7, F4 9/10  2 nouns, non-reversible passive 5/5  3 nouns non-reversible 4/5  L7-8, F4  3 nouns non-reversible 5/5  3 nouns reversible 5/5    2023  L 5-7: /10, L6-7:  2 nouns, non-reversible passive 4/4  2 nouns reversible passive  2/3, 2/5, /10  3 nouns non-reversible 2/3, 4/5    2023  F4  L1-4: 8/10 (quick)  L5: 9/10 2 nouns, non-reversible passive  L6: 7/10 2 nouns reversible passive  L7: /10, 3 nouns non-reversible    2023 Field of 2- next session increase to F4  L1: 10/10 (quick)   L2: 10/10 (quick)   L3: 10/10   L4: 10/10   L1-4: 10/10     2023  L1: 7/10, 10/10  L1-2: 9/10  L1-3: 7/10    2023  L1-4, 33%, 33%, 100%, 50%  L1-2: 80%, 100%   L1: 90%    Provided body parts (on paper), single letters, digits in 10's/100's place value, pt will  "demonstrate  understanding of \"point to/touch/pick up___\" commands 85% of trials. 2023  [employer] Language Kristel  Body parts F6: 9/10    2023  [employer] Language Kristel  Body parts F6: 9/10  10 place value numbers 7/, F10.      2023  [employer] Language Kristel  Body parts F4: 9/10    2023  Letter ID: F4 7/10    2023  Letter ID: F4 5/10     Education/Strategy Training 2023  Pt expectation was discussed this date that pt is expected to look at therapist mouth and produce vocal attempt.     Other 2023  Language kristel- reading phrase completion F4: 8/10    2023  F4 Matching 2 word phrases to single picture: 8/10  Phrase completion: F2- 9/10    2023  Pt reports losing weight because unable to cook and read cook books,  Not enjoying food people bring.    2023  Pt was able to share that Tehlma's dog .       Sharlene Sams    6-2-3 [facility].       "

## 2023-12-28 NOTE — PROGRESS NOTES
Speech-Language Pathology Visit      SLP DAILY NOTE FOR OUTPATIENT THERAPY    Patient: Jennifer Sams   MRN: [id number]  : 1945 78 y.o.  Referring Physician: Liv Haywood, Debra  Date of Visit: 2023      Certification Dates: 23 through 24    Diagnosis:   1. Cerebrovascular accident (CVA), unspecified mechanism (CMS/HCC)    2. Acute ischemic left MCA stroke (CMS/HCC)        Chief Complaints:  apraxia, aphasia    Precautions:  Precautions Comments: aphasia, apraxia, pacemaker       TODAY'S VISIT:    Session Time:  Start Time: 1700  Stop Time: 1800  Time Calculation (min): 60 min   History/Vitals/Pain/Encounter Info - 23 1603        Injury History/Precautions/Daily Required Info    Primary Therapist Magnolia Lundy MS,CCC/SLP     Chief Complaint/Reason for Visit  apraxia, aphasia     Onset of Illness/Injury or Date of Surgery 23     Referring Physician Dr. Wang     Precautions Comments aphasia, apraxia, pacemaker     Limitations/Impairments safety/cognitive     Document Type daily treatment     Patient reported fall since last visit No        Pain Assessment    Currently in pain No/Denies        Pain Interventions    Intervention none     Post Intervention Comments none                Daily Treatment Assessment and Plan - 23 1603        Daily Treatment Assessment and Plan    Progress toward goals Progressing     Daily Outcome Summary Improved production with fading cues of CVC with bilabials.  Pt continues to mostly benefit from visual motor cues.     Plan and Recommendations CVC repeated consonant /t, n/.  Kristel- conversation Therapy-1:1- settings- describe.                  OBJECTIVE MEASUREMENTS TAKEN TODAY:    None Taken    Today's Treatment:    LANGUAGE SLP FLOW SHEET    SKILL AREA CURRENT SESSION   SPEECH THERAPY: LANGUAGE  CPT 38248    MOTOR SPEECH:  Provided Auditory bombardment, picture stimulus, direct visual/aud model, pt will produce target words with  /p,b,m,w,t,d,n,s,l/ in initial position on first trial, 80% of presentations.  12/28/2023  CVC- repeated consonant   fading cues from auditory bombardment, direct model, picture cue  /b/- Mod A  /m/- Mod A  /p/- Mod A    12/19/2023  Aud bombardment CVC /d/  9/10 max A    12/14/2023  CVC  /b/ 10/11  /p/ 10/10  /m/ 8/9 12/7/2023  Practiced target sounds in numbers and letters.   Min cues for /p, b, m, w, s/  Mod A for /t, d, n, l/    11/30/2023  Auditory bombardment, picture, visual stimulus  /p,b/ CVC and CV words. 20 words with cues above.  Pt benefited from anticipation of looking at therapist face.      11/28/2023  Auditory bombardment, picture, visual stimulus  /p,b/ CVC and CV words. 20 words with cues above.    Practiced letter names: a,c, k, t, p,  d, ,m, j, u      Fading cues from above goal: Pt will produce CVCV, VCV and CVC syllables 80% of presentations.    12/7/2023  Naming ted 18/20 word approximations provided clinician model.    11/30/2023  Practiced /M,b,p/ in CV and CVC single syllables. Pt continues to benefit from a prep/anticipatory set for initial phoneme placement prior to the direct model.    11/28/2023  VC    SPOKEN LANG EXP  Given simple picture cue pt will produce simple sentences of 3-4 words with simplified grammatical form and approximated content words to convey meaning, Min A, 80% of presentations.    12/28/2023  2-5 words unscramble to photograph.  90%:  1 noun, simple sentence 2/2  1 pronoun, simple sentence 2/2  1 noun present progressive 1/2  1 pronoun, present progressive 2/2  2 nouns, non-reversible 2/2    12/7/2023  Pt visually unscrambled 3 words to describe a picture on Advanced Comprehension ted build- Noun- Simple Sentences.   Pt will produce simple automatics provided mod A:  1-20  A-J  MALLY 12/28/2023  Unison: 1-10,   Fading unison: 1-10  Unison: 1-14   Unison: A-J, cues to look at therapist's lips   MALLY: one at a time with direct model    12/19/2023  Apraxia  "Kristel  1-10:8/10  MALLY:  0, improved with therapist isolating and repeating   A-J: 7/10 Mod to max A.    2023  Apraxia Kristel:  1-10: Improving  MALLY: improving  A-Z: improving with use of words at end of ApraxiaApp       Apraxia kristel  1-10:  1-5  MALLY: very difficult      Pt will produce simple social responses, Mod A:  Name     Address (parts) 2023  Confrontational questions:pt produced-full name, (S),  Mod-Max A, Address Mod to Max A.     2023  Confrontational questions:pt produced-full name, (S),  Mod A     2023  Flashcards:   Pt says full name,  address, Mod to max A    2023  Pt produced first and last name,  Cues to produce Month, date, and year of , and address.      2023  Flash cards- Pt    SPOKEN LANG COMP  The pt will demonstrate understanding of 2 noun in simple and present and progressive reversible sentences F4, 80% accy.   (Advanced language L1-2 Identify) 2023  L6-7, F4 9/10  2 nouns, non-reversible passive 5/5  3 nouns non-reversible 4/5  L7-8, F4  3 nouns non-reversible 5/5  3 nouns reversible 5/5    2023  L 5-7: 8/10, L6-7:  2 nouns, non-reversible passive 4/4  2 nouns reversible passive  2/3, 2/5, 8/10  3 nouns non-reversible 2/3, 4/5    2023  F4  L1-4: 8/10 (quick)  L5: 9/10 2 nouns, non-reversible passive  L6: 7/10 2 nouns reversible passive  L7: 9/10, 3 nouns non-reversible    2023 Field of 2- next session increase to F4  L1: 10/10 (quick)   L2: 10/10 (quick)   L3: 10/10   L4: 10/10   L1-4: 10/10     2023  L1: 7/10, 10/10  L1-2: 9/10  L1-3: 7/10    2023  L1-4, 33%, 33%, 100%, 50%  L1-2: 80%, 100%   L1: 90%    Provided body parts (on paper), single letters, digits in 10's/100's place value, pt will demonstrate  understanding of \"point to/touch/pick up___\" commands 85% of trials. 2023  [employer] Language Kristel  Body parts F6: 9/10    2023  [employer] Language Kristel  Body parts F6: 9/10  10 place value numbers " , F10.      2023  [employer] Language Kristel  Body parts F4: 9/10    2023  Letter ID: F4 7/10    2023  Letter ID: F4 5/10     Education/Strategy Training 2023  Pt expectation was discussed this date that pt is expected to look at therapist mouth and produce vocal attempt.     Other 2023  Language kristel- reading phrase completion F4: 8/10    2023  F4 Matching 2 word phrases to single picture: 8/10  Phrase completion: F2- 9/10    2023  Pt reports losing weight because unable to cook and read cook books,  Not enjoying food people bring.    2023  Pt was able to share that Thelma's dog .       Sharlene Sams    6-2-3 [facility].

## 2023-12-29 NOTE — OP OT TREATMENT LOG
OT NEURO FLOW SHEET    EXERCISES  Initial Evaluation  Progress Note 1  Progress Note 2  Re-evaluation CURRENT SESSION  8/16/2023  9/14/2023  10/23/23  11/14/23 TIME   NEURO RE-ED  CPT 60823 TOTAL TIME FOR SESSION 38-52 Minutes   AAROM/AROM     Strengthening      Strength       Gross Motor Control Ball toss with capture and control incorporated for improved gross motor coordination including the following:  - BUE integration with eyes occluded  - soft ball toss to therapist  - right hand grasp and release with RUE in different position        Fine Motor Control Large bead grasp and place activity; patient progress to light wooden pegs with focus on tripod grasp at RUE    Sitting Jennifer/Balance     Standing Jennifer/Balance     Sensory re-education     Graded motor imagery      Retraining     THERE ACT  CPT 87595'  TOTAL TIME FOR SESSION 0-7 Minutes   Pain, Vitals, Meds, Etc. pain assessed/monitored rest breaks provided     Assessment     HEP  12/15/23: Added towel grasp and fold exercise, right hand only; patient expressed good understanding.    11/2/23: Review pinch strengthening positions today for improved carry over    10/26/23: Added soft theraputty for right hand pinch (lateral pinch, tip pinch, 3 jaw patti); patient expressed good understnading    Discussed carry over with use of SF splint    9/28/23: Laterality exercise added to HEP; patient and caregiver expressed good understanding    Right hand stretching    9/21/2023:   -Educated on use of rice/bean sensory bin, massage, sensory re-education with deep pressure and textures. Issued tubigrip for RUE sensory re-education and advised to wear 1x per day. Continued to discuss edema management.     Functional Mobility     Transfers     THERE EX  CPT 17202 TOTAL TIME FOR SESSION Not performed   PROM     Activity Tolerance     SELF-CARE  CPT 67310 TOTAL TIME FOR SESSION 8-22 Minutes   Pt Education/Safety     ADL Training     IADL Training One-handed towel folding  with right hand x 5 reps; patient required frequent cueing to avoid using LUE    MODALITIES  CPT 61705 TOTAL TIME FOR SESSION Not performed   Ice/Heat     ATTENDED E-STIM  CPT 88845 TOTAL TIME FOR SESSION Not performed   Attended E-Stim     SPLINTING  CPT 54400 TOTAL TIME FOR SESSION Not performed   Fabrication/Check Out     Fit     Training     GROUP  CPT 15999 TOTAL TIME FOR SESSION Not performed             Normative Range for Female 75+ Years of Age       Right Hand   Left Hand      Initial Evaluation Progress Note 1 Norm Initial Evaluation Progress Note 1 Norm    Strength 0 lbs 0 lbs 42.6 lbs 30 lbs 30 lbs 37.6 lbs   Lateral Pinch N/T 2 lbs 12.6 lbs N/T 14 lbs 11.4 lbs   Three Jaw Yonatan N/T 2 lbs (assist for position) 12 lbs N/T 11 lbs 11.5 lbs   Tip Pinch N/T N/T 9.6 lbs N/T 9 lbs 9.3 lbs   Box and Block 4 blocks 22 blocks 65 blocks 50 blocks 56 blocks 63.6 blocks   Nine Hole Peg (71+ age) Unable to complete Unable to complete 22.49 seconds N/T 24.55 seconds 24.11 seconds

## 2023-12-29 NOTE — PROGRESS NOTES
Occupational Therapy Visit    OT DAILY NOTE FOR OUTPATIENT THERAPY    Patient: Jennifer Sams   MRN: [id number]  : 1945 78 y.o.  Referring Physician: Liv Haywood, Debra  Date of Visit: 2023      Certification Dates: 23 through 24    Diagnosis:   1. Acute ischemic left MCA stroke (CMS/HCC)    2. Muscle weakness (generalized)    3. Lack of coordination        Chief Complaints:   Brain Injury    Precautions:       TODAY'S VISIT    Time In Session:  Start Time: 1703  Stop Time: 1758  Time Calculation (min): 55 min   History/Vitals/Pain/Encounter Info - 23        Injury History/Precautions/Daily Required Info    Document Type daily treatment     Primary Therapist Barry Rosas OTR/CARYN     Chief Complaint/Reason for Visit  Brain Injury     Onset of Illness/Injury or Date of Surgery 23     Referring Physician Dr. Haywood     History of present illness/functional impairment The patient is a 78-year-old  female with a history of AAA, heart block s/p cardiac pacemaker, glaucoma, hyperlipidemia, coronary artery disease, NSTEMI, who presented to [facility] on  after she has not been seen by her friends for a few days.  Patient lives alone and when she was found by EMS she was confused and combative.  In the emergency department she was aphasic with right-sided weakness and hypertensive to the 190 systolic.  CT scan of the head revealed an acute infarct in left MCA with mild bleeding.  Echo showed an EF of 43% and a bubble study was negative.  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  Later this was transitioned to apixaban.  She was continued on statin for secondary prevention.  She required virtual shivani while in the hospital but this was not effective.  She was evaluated by speech therapy and cleared for a soft and bite-size diet with moderately thick liquids.'      Patient reported fall since last visit No        Pain Assessment    Currently in pain No/Denies         Services    Do You Speak a Language Other Than English at Home? no                Daily Treatment Assessment and Plan - 12/29/23 1238        Daily Treatment Assessment and Plan    Progress toward goals Progressing     Daily Outcome Summary Patient able to progress with gross and fine motor coordination activities today. Patient demonstrates consistency with gross motor use of right hand and able to progress to eyes occluded with improvement appreciation over several repetitions. Patient challenged by fine motor tripod grasp at right hand and required frequent cueing for proper hand position with pre-grasp due to tendency to adduct right thumb leading to drops.     Plan and Recommendations Focus on pre-grasp with repeition across several objects and widths                     OBJECTIVE DATA TAKEN TODAY    None Taken    Today's Treatment:      OT NEURO FLOW SHEET    EXERCISES  Initial Evaluation  Progress Note 1  Progress Note 2  Re-evaluation CURRENT SESSION  8/16/2023  9/14/2023  10/23/23  11/14/23 TIME   NEURO RE-ED  CPT 09183 TOTAL TIME FOR SESSION 38-52 Minutes   AAROM/AROM     Strengthening      Strength       Gross Motor Control Ball toss with capture and control incorporated for improved gross motor coordination including the following:  - BUE integration with eyes occluded  - soft ball toss to therapist  - right hand grasp and release with RUE in different position        Fine Motor Control Large bead grasp and place activity; patient progress to light wooden pegs with focus on tripod grasp at RUE    Sitting Jennifer/Balance     Standing Jennifer/Balance     Sensory re-education     Graded motor imagery      Retraining     THERE ACT  CPT 39700'  TOTAL TIME FOR SESSION 0-7 Minutes   Pain, Vitals, Meds, Etc. pain assessed/monitored rest breaks provided     Assessment     HEP  12/15/23: Added towel  grasp and fold exercise, right hand only; patient expressed good understanding.    11/2/23: Review pinch strengthening positions today for improved carry over    10/26/23: Added soft theraputty for right hand pinch (lateral pinch, tip pinch, 3 jaw patti); patient expressed good understnading    Discussed carry over with use of SF splint    9/28/23: Laterality exercise added to HEP; patient and caregiver expressed good understanding    Right hand stretching    9/21/2023:   -Educated on use of rice/bean sensory bin, massage, sensory re-education with deep pressure and textures. Issued tubigrip for RUE sensory re-education and advised to wear 1x per day. Continued to discuss edema management.     Functional Mobility     Transfers     THERE EX  CPT 01599 TOTAL TIME FOR SESSION Not performed   PROM     Activity Tolerance     SELF-CARE  CPT 63406 TOTAL TIME FOR SESSION 8-22 Minutes   Pt Education/Safety     ADL Training     IADL Training One-handed towel folding with right hand x 5 reps; patient required frequent cueing to avoid using LUE    MODALITIES  CPT 46484 TOTAL TIME FOR SESSION Not performed   Ice/Heat     ATTENDED E-STIM  CPT 39842 TOTAL TIME FOR SESSION Not performed   Attended E-Stim     SPLINTING  CPT 80496 TOTAL TIME FOR SESSION Not performed   Fabrication/Check Out     Fit     Training     GROUP  CPT 18659 TOTAL TIME FOR SESSION Not performed             Normative Range for Female 75+ Years of Age       Right Hand   Left Hand      Initial Evaluation Progress Note 1 Norm Initial Evaluation Progress Note 1 Norm    Strength 0 lbs 0 lbs 42.6 lbs 30 lbs 30 lbs 37.6 lbs   Lateral Pinch N/T 2 lbs 12.6 lbs N/T 14 lbs 11.4 lbs   Three Jaw Patti N/T 2 lbs (assist for position) 12 lbs N/T 11 lbs 11.5 lbs   Tip Pinch N/T N/T 9.6 lbs N/T 9 lbs 9.3 lbs   Box and Block 4 blocks 22 blocks 65 blocks 50 blocks 56 blocks 63.6 blocks   Nine Hole Peg (71+ age) Unable to complete Unable to complete 22.49 seconds N/T 24.55  seconds 24.11 seconds

## 2024-01-02 ENCOUNTER — HOSPITAL ENCOUNTER (OUTPATIENT)
Dept: OCCUPATIONAL THERAPY | Age: 79
Setting detail: THERAPIES SERIES
Discharge: HOME | End: 2024-01-02
Attending: PHYSICAL MEDICINE & REHABILITATION
Payer: MEDICARE

## 2024-01-02 ENCOUNTER — HOSPITAL ENCOUNTER (OUTPATIENT)
Dept: SPEECH THERAPY | Age: 79
Setting detail: THERAPIES SERIES
Discharge: HOME | End: 2024-01-02
Attending: PHYSICAL MEDICINE & REHABILITATION
Payer: MEDICARE

## 2024-01-02 DIAGNOSIS — I63.9 CEREBROVASCULAR ACCIDENT (CVA), UNSPECIFIED MECHANISM (CMS/HCC): Primary | ICD-10-CM

## 2024-01-02 DIAGNOSIS — R27.9 LACK OF COORDINATION: ICD-10-CM

## 2024-01-02 DIAGNOSIS — M62.81 MUSCLE WEAKNESS (GENERALIZED): ICD-10-CM

## 2024-01-02 DIAGNOSIS — I63.512 ACUTE ISCHEMIC LEFT MCA STROKE (CMS/HCC): Primary | ICD-10-CM

## 2024-01-02 DIAGNOSIS — I63.512 ACUTE ISCHEMIC LEFT MCA STROKE (CMS/HCC): ICD-10-CM

## 2024-01-02 PROCEDURE — 97110 THERAPEUTIC EXERCISES: CPT | Mod: GO,59

## 2024-01-02 PROCEDURE — 97112 NEUROMUSCULAR REEDUCATION: CPT | Mod: GO,59

## 2024-01-02 PROCEDURE — 92507 TX SP LANG VOICE COMM INDIV: CPT | Mod: GN

## 2024-01-02 NOTE — PROGRESS NOTES
Speech-Language Pathology Visit      SLP DAILY NOTE FOR OUTPATIENT THERAPY    Patient: Jennifer Sams   MRN: [id number]  : 1945 78 y.o.  Referring Physician: Liv Haywood, Debra  Date of Visit: 2024      Certification Dates: 23 through 24    Diagnosis:   1. Cerebrovascular accident (CVA), unspecified mechanism (CMS/HCC)    2. Acute ischemic left MCA stroke (CMS/HCC)        Chief Complaints:  apraxia, aphasia    Precautions:  Precautions Comments: aphasia, apraxia, pacemaker       TODAY'S VISIT:    Session Time:  Start Time: 1500  Stop Time: 1600  Time Calculation (min): 60 min   History/Vitals/Pain/Encounter Info - 24 1506        Injury History/Precautions/Daily Required Info    Primary Therapist Magnolia Lundy MS,CCC/SLP     Chief Complaint/Reason for Visit  apraxia, aphasia     Onset of Illness/Injury or Date of Surgery 23     Referring Physician Dr. Wang     Precautions Comments aphasia, apraxia, pacemaker     Limitations/Impairments safety/cognitive     Document Type daily treatment     Patient/Family/Caregiver Comments/Observations Tooth broke in upper right quadrant. Plans to see dentist this week- office was closed.  REports friend Arnol who drove pt here is sick so she sat in the back seat with a mask on and feels car sick.     Patient reported fall since last visit No        Pain Assessment    Currently in pain Yes     Preferred Pain Scale number (Numeric Rating Pain Scale)     Pain Side/Orientation right     Pain: Body location --   Tooth    Pain Rating (0-10): Pre Activity 1     Pain Rating (0-10): Activity 1     Pain Rating (0-10): Post Activity 1        Pain Interventions    Intervention can't eat hot things.     Post Intervention Comments none                Daily Treatment Assessment and Plan - 24 1506        Daily Treatment Assessment and Plan    Progress toward goals Progressing     Daily Outcome Summary Pt arrived at therapy with frustration,  upset at environmental circumstances.  Used opportunity for conversational speech.  Pt was able to talk about everyday problems (x3) with some assistance via use of phrases, gestures with help from the listener, binary choices for direct model of word, for clarification, phrase expansion.     Plan and Recommendations CVC repeated consonant /t, n/.  [employer] kristel for spelling- fill in the blank for CVCV, CVC VCV words. Kristel- conversation Therapy-1:1- settings- describe                  OBJECTIVE MEASUREMENTS TAKEN TODAY:    None Taken    Today's Treatment:    LANGUAGE SLP FLOW SHEET    SKILL AREA CURRENT SESSION   SPEECH THERAPY: LANGUAGE  CPT 43840    MOTOR SPEECH:  Provided Auditory bombardment, picture stimulus, direct visual/aud model, pt will produce target words with /p,b,m,w,t,d,n,s,l/ in initial position on first trial, 80% of presentations.  12/28/2023  CVC- repeated consonant   fading cues from auditory bombardment, direct model, picture cue  /b/- Mod A  /m/- Mod A  /p/- Mod A    12/19/2023  Aud bombardment CVC /d/  9/10 max A    12/14/2023  CVC  /b/ 10/11  /p/ 10/10  /m/ 8/9 12/7/2023  Practiced target sounds in numbers and letters.   Min cues for /p, b, m, w, s/  Mod A for /t, d, n, l/    11/30/2023  Auditory bombardment, picture, visual stimulus  /p,b/ CVC and CV words. 20 words with cues above.  Pt benefited from anticipation of looking at therapist face.      11/28/2023  Auditory bombardment, picture, visual stimulus  /p,b/ CVC and CV words. 20 words with cues above.    Practiced letter names: a,c, k, t, p,  d, ,m, j, u      Fading cues from above goal: Pt will produce CVCV, VCV and CVC syllables 80% of presentations.  1/2/2024  Provided 3 letter words with letter fillins, improved naming of bodyparts and clothing items. ([employer] Language kristel- 3-4 letter words)    12/7/2023  Naming kristel 18/20 word approximations provided clinician model.    11/30/2023  Practiced /M,b,p/ in CV and CVC single syllables. Pt  continues to benefit from a prep/anticipatory set for initial phoneme placement prior to the direct model.    2023  VC    SPOK[employer] LANG EXP  Given simple picture cue pt will produce simple sentences of 3-4 words with simplified grammatical form and approximated content words to convey meaning, Min A, 80% of presentations.    2023  2-5 words unscramble to photograph.  90%:  1 noun, simple sentence 2/2  1 pronoun, simple sentence 2/2  1 noun present progressive 1/2  1 pronoun, present progressive 2/2  2 nouns, non-reversible 2/2    2023  Pt visually unscrambled 3 words to describe a picture on Advanced Comprehension kristel build- Noun- Simple Sentences.   Pt will produce simple automatics provided mod A:  1-20  A-J  MALLY 2023  Unison: 1-10,   Fading unison: 1-10  Unison: 1-14   Unison: A-J, cues to look at therapist's lips   MALLY: one at a time with direct model    2023  Apraxia Kristel  1-10:8/10  MALLY:  0/, improved with therapist isolating and repeating   A-J: 7/10 Mod to max A.    2023  Apraxia Kristel:  1-10: Improving  MALLY: improving  A-Z: improving with use of words at end of ApraxiaApp       Apraxia kristel  1-10:  1-5  MALLY: very difficult      Pt will produce simple social responses, Mod A:  Name     Address (parts) 2023  Confrontational questions:pt produced-full name, (S),  Mod-Max A, Address Mod to Max A.     2023  Confrontational questions:pt produced-full name, (S),  Mod A     2023  Flashcards:   Pt says full name,  address, Mod to max A    2023  Pt produced first and last name,  Cues to produce Month, date, and year of , and address.      2023  Flash cards- Pt    SP[employer]EN LANG COMP  The pt will demonstrate understanding of 2 noun in simple and present and progressive reversible sentences F4, 80% accy.   (Advanced language L1-2 Identify) 2023  L6-7, F4 9/10  2 nouns, non-reversible passive   3 nouns non-reversible  "4/  L7-8, F4  3 nouns non-reversible 5/5  3 nouns reversible 5/5     2023  L 5-7: 8/10, L6-7:  2 nouns, non-reversible passive 4/  2 nouns reversible passive  2/3, 2/5, 8/10  3 nouns non-reversible 2/3, 4/5    2023  F4  L1-4: 8/10 (quick)  L5: 9/10 2 nouns, non-reversible passive  L6: 7/10 2 nouns reversible passive  L7: 9/10, 3 nouns non-reversible    2023 Field of 2- next session increase to F4  L1: 10/10 (quick)   L2: 10/10 (quick)   L3: 10/10   L4: 10/10   L1-4: 10/10     2023  L1: 7/10, 10/10  L1-2: 9/10  L1-3: 7/10    2023  L1-4, 33%, 33%, 100%, 50%  L1-2: 80%, 100%   L1: 90%    Provided body parts (on paper), single letters, digits in 10's/100's place value, pt will demonstrate  understanding of \"point to/touch/pick up___\" commands 85% of trials. 2024  3-4 letter words focused around body parts and letters.  Pt able to add single phoneme and name word with phonemic errors.      Body parts: f6: 90% accy.    2023  [employer] Kristel  Body parts F6: 9/10    2023  [employer] Language Kristel  Body parts F6: 9/10  10 place value numbers 7/, F10.      2023  [employer] Language Kristel  Body parts F4: 9/10    2023  Letter ID: F4 7/10    2023  Letter ID: F4 5/10     Education/Strategy Training 2023  Pt expectation was discussed this date that pt is expected to look at therapist mouth and produce vocal attempt.     Other 2023  Language kristel- reading phrase completion F4: 8/10    2023  F4 Matching 2 word phrases to single picture: 8/10  Phrase completion: F2- 9/10    2023  Pt reports losing weight because unable to cook and read cook books,  Not enjoying food people bring.    2023  Pt was able to share that Thelma's dog .       Sharlene Sams    6-2-3 [facility].                                 "

## 2024-01-02 NOTE — PROGRESS NOTES
Occupational Therapy Visit    OT DAILY NOTE FOR OUTPATIENT THERAPY    Patient: Jennifer Sams   MRN: [id number]  : 1945 78 y.o.  Referring Physician: Liv Haywood, Debra  Date of Visit: 2024      Certification Dates: 23 through 24    Diagnosis:   1. Acute ischemic left MCA stroke (CMS/HCC)    2. Muscle weakness (generalized)    3. Lack of coordination        Chief Complaints:   Brain Injury    Precautions:       TODAY'S VISIT    Time In Session:  Start Time: 1602  Stop Time: 1655  Time Calculation (min): 53 min   History/Vitals/Pain/Encounter Info - 24 1701        Injury History/Precautions/Daily Required Info    Document Type daily treatment     Primary Therapist Barry Rosas OTR/CARYN     Chief Complaint/Reason for Visit  Brain Injury     Onset of Illness/Injury or Date of Surgery 23     Referring Physician Dr. Haywood     History of present illness/functional impairment The patient is a 78-year-old  female with a history of AAA, heart block s/p cardiac pacemaker, glaucoma, hyperlipidemia, coronary artery disease, NSTEMI, who presented to [facility] on  after she has not been seen by her friends for a few days.  Patient lives alone and when she was found by EMS she was confused and combative.  In the emergency department she was aphasic with right-sided weakness and hypertensive to the 190 systolic.  CT scan of the head revealed an acute infarct in left MCA with mild bleeding.  Echo showed an EF of 43% and a bubble study was negative.  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  Later this was transitioned to apixaban.  She was continued on statin for secondary prevention.  She required virtual shivani while in the hospital but this was not effective.  She was evaluated by speech therapy and cleared for a soft and bite-size diet with moderately thick liquids.'      Patient/Family/Caregiver Comments/Observations Patient arrives with handoff provided by treating speech therapist reporting patient with down mood today     Patient reported fall since last visit No        Pain Assessment    Currently in pain No/Denies         Services    Do You Speak a Language Other Than English at Home? no                Daily Treatment Assessment and Plan - 01/02/24 1802        Daily Treatment Assessment and Plan    Progress toward goals Progressing     Daily Outcome Summary Patient demonstrates functional pre-grasp and gross grasp with cylindrical and spherical objects today but more challenged with narrow object requiring tripod grasp for stabilizing in right hand. She demonstrates intermittent dropping with peg removal activity, worse with frustration and requiring verbal cueing to slow down. She was able to progress with gross motor control to [facility] with RUE only today.     Plan and Recommendations Focus on pre-grasp with repeition across several objects and widths                     OBJECTIVE DATA TAKEN TODAY    None Taken    Today's Treatment:      OT NEURO FLOW SHEET    EXERCISES  Initial Evaluation  Progress Note 1  Progress Note 2  Re-evaluation CURRENT SESSION  8/16/2023  9/14/2023  10/23/23  11/14/23 TIME   NEURO RE-ED  CPT 46582 TOTAL TIME FOR SESSION 38-52 Minutes   AAROM/AROM     Strengthening      Strength       Gross Motor Control Large ball catch and return  - graded up with self toss and hit, volley with right hand      Fine Motor Control Gross grasp with large cylinder, thin toothbrush, and soft ball, 3 sets of 15 each    Grasp and place with graded pegboard x 2 trials for improved item stabilization    Sitting Jennifer/Balance     Standing Jennifer/Balance     Sensory re-education     Graded motor imagery      Retraining     THERE ACT  CPT 33338'  TOTAL TIME FOR SESSION 0-7 Minutes   Pain, Vitals, Meds, Etc. pain assessed/monitored rest breaks  provided     Assessment     HEP  12/15/23: Added towel grasp and fold exercise, right hand only; patient expressed good understanding.    11/2/23: Review pinch strengthening positions today for improved carry over    10/26/23: Added soft theraputty for right hand pinch (lateral pinch, tip pinch, 3 jaw patti); patient expressed good understnading    Discussed carry over with use of SF splint    9/28/23: Laterality exercise added to HEP; patient and caregiver expressed good understanding    Right hand stretching    9/21/2023:   -Educated on use of rice/bean sensory bin, massage, sensory re-education with deep pressure and textures. Issued tubigrip for RUE sensory re-education and advised to wear 1x per day. Continued to discuss edema management.     Functional Mobility     Transfers     THERE EX  CPT 17152 TOTAL TIME FOR SESSION 0-7 Minutes   PROM Right hand PROM gentle composite flexion    Activity Tolerance     SELF-CARE  CPT 68041 TOTAL TIME FOR SESSION 0-7 Minutes   Pt Education/Safety     ADL Training Discussed right hand incorporation with ADLs including filing nails, grasping toothbrush for self-grooming    IADL Training     MODALITIES  CPT 92700 TOTAL TIME FOR SESSION Not performed   Ice/Heat     ATTENDED E-STIM  CPT 42148 TOTAL TIME FOR SESSION Not performed   Attended E-Stim     SPLINTING  CPT 59190 TOTAL TIME FOR SESSION Not performed   Fabrication/Check Out     Fit     Training     GROUP  CPT 48237 TOTAL TIME FOR SESSION Not performed             Normative Range for Female 75+ Years of Age       Right Hand   Left Hand      Initial Evaluation Progress Note 1 Norm Initial Evaluation Progress Note 1 Norm    Strength 0 lbs 0 lbs 42.6 lbs 30 lbs 30 lbs 37.6 lbs   Lateral Pinch N/T 2 lbs 12.6 lbs N/T 14 lbs 11.4 lbs   Three Jaw Patti N/T 2 lbs (assist for position) 12 lbs N/T 11 lbs 11.5 lbs   Tip Pinch N/T N/T 9.6 lbs N/T 9 lbs 9.3 lbs   Box and Block 4 blocks 22 blocks 65 blocks 50 blocks 56 blocks 63.6  blocks   Nine Hole Peg (71+ age) Unable to complete Unable to complete 22.49 seconds N/T 24.55 seconds 24.11 seconds

## 2024-01-02 NOTE — OP SLP TREATMENT LOG
LANGUAGE SLP FLOW SHEET    SKILL AREA CURRENT SESSION   SPEECH THERAPY: LANGUAGE  CPT 85706    MOTOR SPEECH:  Provided Auditory bombardment, picture stimulus, direct visual/aud model, pt will produce target words with /p,b,m,w,t,d,n,s,l/ in initial position on first trial, 80% of presentations.  12/28/2023  CVC- repeated consonant   fading cues from auditory bombardment, direct model, picture cue  /b/- Mod A  /m/- Mod A  /p/- Mod A    12/19/2023  Aud bombardment CVC /d/  9/10 max A    12/14/2023  CVC  /b/ 10/11  /p/ 10/10  /m/ 8/9 12/7/2023  Practiced target sounds in numbers and letters.   Min cues for /p, b, m, w, s/  Mod A for /t, d, n, l/    11/30/2023  Auditory bombardment, picture, visual stimulus  /p,b/ CVC and CV words. 20 words with cues above.  Pt benefited from anticipation of looking at therapist face.      11/28/2023  Auditory bombardment, picture, visual stimulus  /p,b/ CVC and CV words. 20 words with cues above.    Practiced letter names: a,c, k, t, p,  d, ,m, j, u      Fading cues from above goal: Pt will produce CVCV, VCV and CVC syllables 80% of presentations.  1/2/2024  Provided 3 letter words with letter fillins, improved naming of bodyparts and clothing items. ([employer] Language ted- 3-4 letter words)    12/7/2023  Naming etd 18/20 word approximations provided clinician model.    11/30/2023  Practiced /M,b,p/ in CV and CVC single syllables. Pt continues to benefit from a prep/anticipatory set for initial phoneme placement prior to the direct model.    11/28/2023  VC    SPOKEN LANG EXP  Given simple picture cue pt will produce simple sentences of 3-4 words with simplified grammatical form and approximated content words to convey meaning, Min A, 80% of presentations.    12/28/2023  2-5 words unscramble to photograph.  90%:  1 noun, simple sentence 2/2  1 pronoun, simple sentence 2/2  1 noun present progressive 1/2  1 pronoun, present progressive 2/2  2 nouns, non-reversible 2/2 12/7/2023  Pt  visually unscrambled 3 words to describe a picture on Advanced Comprehension kristel build- Noun- Simple Sentences.   Pt will produce simple automatics provided mod A:  1-20  A-J  MALLY 2023  Unison: 1-10,   Fading unison: 1-10  Unison: 1-   Unison: A-J, cues to look at therapist's lips   MALLY: one at a time with direct model    2023  Apraxia Kristel  1-10:8/10  MALLY:  0, improved with therapist isolating and repeating   A-J: 7/10 Mod to max A.    2023  Apraxia Kristel:  1-10: Improving  MALLY: improving  A-Z: improving with use of words at end of ApraxiaApp       Apraxia kristel  1-10:  1-5  MALLY: very difficult      Pt will produce simple social responses, Mod A:  Name     Address (parts) 2023  Confrontational questions:pt produced-full name, (S),  Mod-Max A, Address Mod to Max A.     2023  Confrontational questions:pt produced-full name, (S),  Mod A     2023  Flashcards:   Pt says full name,  address, Mod to max A    2023  Pt produced first and last name,  Cues to produce Month, date, and year of , and address.      2023  Flash cards- Pt    SPOKEN LANG COMP  The pt will demonstrate understanding of 2 noun in simple and present and progressive reversible sentences F4, 80% accy.   (Advanced language L1-2 Identify) 2023  L6-7, F4 9/10  2 nouns, non-reversible passive 5/5  3 nouns non-reversible 4/5  L7-8, F4  3 nouns non-reversible 5/5  3 nouns reversible 5/5     2023  L 5-7: 8/10, L6-7:  2 nouns, non-reversible passive 4/4  2 nouns reversible passive  2/3, 2/5, 8/10  3 nouns non-reversible 2/3, 4/5    2023  F4  L1-4: 8/10 (quick)  L5: 9/10 2 nouns, non-reversible passive  L6: 7/10 2 nouns reversible passive  L7: 9/10, 3 nouns non-reversible    2023 Field [place]- next session increase to F4  L1: 10/10 (quick)   L2: 10/10 (quick)   L3: 10/10   L4: 10/10   L1-4: 10/10     2023  L1: 7/10, 10/10  L1-2: 9/10  L1-3:  "7/10    2023  L1-4, 33%, 33%, 100%, 50%  L1-2: 80%, 100%   L1: 90%    Provided body parts (on paper), single letters, digits in 10's/100's place value, pt will demonstrate  understanding of \"point to/touch/pick up___\" commands 85% of trials. 2024  3-4 letter words focused around body parts and letters.  Pt able to add single phoneme and name word with phonemic errors.      Body parts: f6: 90% accy.    2023  [employer] Language Kristel  Body parts F6: 9/10    2023  [employer] Language Kristel  Body parts F6: 9/10  10 place value numbers 7, F10.      2023  [employer] Language Kristel  Body parts F4: 9/10    2023  Letter ID: F4 7/10    2023  Letter ID: F4 5/10     Education/Strategy Training 2023  Pt expectation was discussed this date that pt is expected to look at therapist mouth and produce vocal attempt.     Other 2023  Language kristel- reading phrase completion F4: 8/10    2023  F4 Matching 2 word phrases to single picture: 8/10  Phrase completion: F2- 9/10    2023  Pt reports losing weight because unable to cook and read cook books,  Not enjoying food people bring.    2023  Pt was able to share that Thelma's dog .       Sharlene Sams    6-2-3 [facility].       "

## 2024-01-02 NOTE — OP OT TREATMENT LOG
OT NEURO FLOW SHEET    EXERCISES  Initial Evaluation  Progress Note 1  Progress Note 2  Re-evaluation CURRENT SESSION  8/16/2023  9/14/2023  10/23/23  11/14/23 TIME   NEURO RE-ED  CPT 33703 TOTAL TIME FOR SESSION 38-52 Minutes   AAROM/AROM     Strengthening      Strength       Gross Motor Control Large ball catch and return  - graded up with self toss and hit, volley with right hand      Fine Motor Control Gross grasp with large cylinder, thin toothbrush, and soft ball, 3 sets of 15 each    Grasp and place with graded pegboard x 2 trials for improved item stabilization    Sitting Jennifer/Balance     Standing Jennifer/Balance     Sensory re-education     Graded motor imagery      Retraining     THERE ACT  CPT 41404'  TOTAL TIME FOR SESSION 0-7 Minutes   Pain, Vitals, Meds, Etc. pain assessed/monitored rest breaks provided     Assessment     HEP  12/15/23: Added towel grasp and fold exercise, right hand only; patient expressed good understanding.    11/2/23: Review pinch strengthening positions today for improved carry over    10/26/23: Added soft theraputty for right hand pinch (lateral pinch, tip pinch, 3 jaw patti); patient expressed good understnading    Discussed carry over with use of SF splint    9/28/23: Laterality exercise added to HEP; patient and caregiver expressed good understanding    Right hand stretching    9/21/2023:   -Educated on use of rice/bean sensory bin, massage, sensory re-education with deep pressure and textures. Issued tubigrip for RUE sensory re-education and advised to wear 1x per day. Continued to discuss edema management.     Functional Mobility     Transfers     THERE EX  CPT 48028 TOTAL TIME FOR SESSION 0-7 Minutes   PROM Right hand PROM gentle composite flexion    Activity Tolerance     SELF-CARE  CPT 79075 TOTAL TIME FOR SESSION 0-7 Minutes   Pt Education/Safety     ADL Training Discussed right hand incorporation with ADLs including filing nails, grasping toothbrush for self-grooming     IADL Training     MODALITIES  CPT 85110 TOTAL TIME FOR SESSION Not performed   Ice/Heat     ATTENDED E-STIM  CPT 33596 TOTAL TIME FOR SESSION Not performed   Attended E-Stim     SPLINTING  CPT 51713 TOTAL TIME FOR SESSION Not performed   Fabrication/Check Out     Fit     Training     GROUP  CPT 39395 TOTAL TIME FOR SESSION Not performed             Normative Range for Female 75+ Years of Age       Right Hand   Left Hand      Initial Evaluation Progress Note 1 Norm Initial Evaluation Progress Note 1 Norm    Strength 0 lbs 0 lbs 42.6 lbs 30 lbs 30 lbs 37.6 lbs   Lateral Pinch N/T 2 lbs 12.6 lbs N/T 14 lbs 11.4 lbs   Three Jaw Yonatan N/T 2 lbs (assist for position) 12 lbs N/T 11 lbs 11.5 lbs   Tip Pinch N/T N/T 9.6 lbs N/T 9 lbs 9.3 lbs   Box and Block 4 blocks 22 blocks 65 blocks 50 blocks 56 blocks 63.6 blocks   Nine Hole Peg (71+ age) Unable to complete Unable to complete 22.49 seconds N/T 24.55 seconds 24.11 seconds

## 2024-01-04 ENCOUNTER — HOSPITAL ENCOUNTER (OUTPATIENT)
Dept: OCCUPATIONAL THERAPY | Age: 79
Setting detail: THERAPIES SERIES
Discharge: HOME | End: 2024-01-04
Attending: PHYSICAL MEDICINE & REHABILITATION
Payer: MEDICARE

## 2024-01-04 ENCOUNTER — HOSPITAL ENCOUNTER (OUTPATIENT)
Dept: SPEECH THERAPY | Age: 79
Setting detail: THERAPIES SERIES
Discharge: HOME | End: 2024-01-04
Attending: PHYSICAL MEDICINE & REHABILITATION
Payer: MEDICARE

## 2024-01-04 DIAGNOSIS — M62.81 MUSCLE WEAKNESS (GENERALIZED): ICD-10-CM

## 2024-01-04 DIAGNOSIS — I63.9 CEREBROVASCULAR ACCIDENT (CVA), UNSPECIFIED MECHANISM (CMS/HCC): Primary | ICD-10-CM

## 2024-01-04 DIAGNOSIS — I63.512 ACUTE ISCHEMIC LEFT MCA STROKE (CMS/HCC): Primary | ICD-10-CM

## 2024-01-04 PROCEDURE — 92507 TX SP LANG VOICE COMM INDIV: CPT | Mod: GN

## 2024-01-04 PROCEDURE — 97112 NEUROMUSCULAR REEDUCATION: CPT | Mod: GO,59

## 2024-01-04 PROCEDURE — 97535 SELF CARE MNGMENT TRAINING: CPT | Mod: GO

## 2024-01-04 NOTE — PROGRESS NOTES
Speech-Language Pathology Visit      SLP DAILY NOTE FOR OUTPATIENT THERAPY    Patient: Jennifer Sams   MRN: [id number]  : 1945 78 y.o.  Referring Physician: Liv Haywood, Debra  Date of Visit: 2024      Certification Dates: 23 through 24    Diagnosis:   1. Cerebrovascular accident (CVA), unspecified mechanism (CMS/HCC)        Chief Complaints:  apraxia, aphasia    Precautions:  Precautions Comments: aphasia, apraxia, pacemaker       TODAY'S VISIT:    Session Time:  Start Time: 1700  Stop Time: 1800  Time Calculation (min): 60 min   History/Vitals/Pain/Encounter Info - 24 170        Injury History/Precautions/Daily Required Info    Primary Therapist Magnolia Lundy MS,CCC/SLP     Chief Complaint/Reason for Visit  apraxia, aphasia     Onset of Illness/Injury or Date of Surgery 23     Referring Physician Dr. Wang     Precautions Comments aphasia, apraxia, pacemaker     Limitations/Impairments safety/cognitive     Document Type daily treatment     Patient/Family/Caregiver Comments/Observations Pt reports seeing dentist.  Pt reports concern with right hand hurting- but did not report on pain scale.     Patient reported fall since last visit No        Pain Assessment    Currently in pain Yes     Preferred Pain Scale number (Numeric Rating Pain Scale)     Pain Side/Orientation right     FACES Pain Rating: Rest 2-->hurts little bit        Pain Interventions    Intervention not eating any better with the tooth     Post Intervention Comments none                Daily Treatment Assessment and Plan - 24        Daily Treatment Assessment and Plan    Progress toward goals Progressing     Daily Outcome Summary Pt utilized reading skills of single words and phrases to practice language syntax.     Plan and Recommendations CVC repeated consonant /t, n/.  Highfive kristel for spelling- fill in the blank for CVCV, CVC VCV words. Kristel- conversation Therapy-1:1- settings- describe                   OBJECTIVE MEASUREMENTS TAKEN TODAY:    None Taken    Today's Treatment:    LANGUAGE SLP FLOW SHEET    SKILL AREA CURRENT SESSION   SPEECH THERAPY: LANGUAGE  CPT 03731    MOTOR SPEECH:  Provided Auditory bombardment, picture stimulus, direct visual/aud model, pt will produce target words with /p,b,m,w,t,d,n,s,l/ in initial position on first trial, 80% of presentations.  12/28/2023  CVC- repeated consonant   fading cues from auditory bombardment, direct model, picture cue  /b/- Mod A  /m/- Mod A  /p/- Mod A    12/19/2023  Aud bombardment CVC /d/  9/10 max A    12/14/2023  CVC  /b/ 10/11  /p/ 10/10  /m/ 8/9 12/7/2023  Practiced target sounds in numbers and letters.   Min cues for /p, b, m, w, s/  Mod A for /t, d, n, l/    11/30/2023  Auditory bombardment, picture, visual stimulus  /p,b/ CVC and CV words. 20 words with cues above.  Pt benefited from anticipation of looking at therapist face.      11/28/2023  Auditory bombardment, picture, visual stimulus  /p,b/ CVC and CV words. 20 words with cues above.    Practiced letter names: a,c, k, t, p,  d, ,m, j, u      Fading cues from above goal: Pt will produce CVCV, VCV and CVC syllables 80% of presentations.  1/4/2024  Writing  3-5 letter words: letter fill in.  - become frustrated     Comprehension  Match oral word to written word F6: (body parts and clothes)- 66%  Match word to photograph F4: (animals, places, people) 9/10    Read word to photograph F4: 9/10     Reading  Read phrase F4 to photo: 9/10   Phrase completion: F4: 7/10         1/2/2024  Provided 3 letter words with letter fillins, improved naming of bodyparts and clothing items. ([employer] Language ted- 3-4 letter words)    12/7/2023  Naming ted 18/20 word approximations provided clinician model.    11/30/2023  Practiced /M,b,p/ in CV and CVC single syllables. Pt continues to benefit from a prep/anticipatory set for initial phoneme placement prior to the direct model.    11/28/2023  VC     SPOKEN LANG EXP  Given simple picture cue pt will produce simple sentences of 3-4 words with simplified grammatical form and approximated content words to convey meaning, Min A, 80% of presentations.    2024  Advanced Language (comp) build  Difficulty with L5-6, +1 extra word- 40%  L5: Nouns, non-reversible: 2/5, 40%  L6: 1 pronoun, 1 nouns, non reversible : 2/5, 40%-Additional 3 with hint    2023  2-5 words unscramble to photograph.  90%:  1 noun, simple sentence 2/2  1 pronoun, simple sentence 2/2  1 noun present progressive 1/2  1 pronoun, present progressive 2/2  2 nouns, non-reversible 2/2    2023  Pt visually unscrambled 3 words to describe a picture on Advanced Comprehension kristel build- Noun- Simple Sentences.   Pt will produce simple automatics provided mod A:  1-20  A-J  MALLY 2023  Unison: 1-10,   Fading unison: 1-10  Unison: 1-14   Unison: A-J, cues to look at therapist's lips   MALLY: one at a time with direct model    2023  Apraxia Kristel  1-10:8/10  MALLY:  0, improved with therapist isolating and repeating   A-J: 7/10 Mod to max A.    2023  Apraxia Kristel:  1-10: Improving  MALLY: improving  A-Z: improving with use of words at end of ApraxiaApp       Apraxia kristle  1-10:  1-5  MALLY: very difficult      Pt will produce simple social responses, Mod A:  Name     Address (parts) 2023  Confrontational questions:pt produced-full name, (S),  Mod-Max A, Address Mod to Max A.     2023  Confrontational questions:pt produced-full name, (S),  Mod A     2023  Flashcards:   Pt says full name,  address, Mod to max A    2023  Pt produced first and last name,  Cues to produce Month, date, and year of , and address.      2023  Flash cards- Pt    SPOKEN LANG COMP  The pt will demonstrate understanding of 2 noun in simple and present and progressive reversible sentences F4, 80% accy.   (Advanced language comprehension L1-2 Identify)  "    2023  L6-7, F4 9/10  2 nouns, non-reversible passive 5/5  3 nouns non-reversible 4/5  L7-8, F4  3 nouns non-reversible 5/5  3 nouns reversible 5/5     2023  L 5-7: 8/10, L6-7:  2 nouns, non-reversible passive 4/4  2 nouns reversible passive  2/3, 2/5, 8/10  3 nouns non-reversible 2/3, 4/5    2023  F4  L1-4: 8/10 (quick)  L5: 9/10 2 nouns, non-reversible passive  L6: 7/10 2 nouns reversible passive  L7: 9/10, 3 nouns non-reversible    2023 Field of 2- next session increase to F4  L1: 10/10 (quick)   L2: 10/10 (quick)   L3: 10/10   L4: 10/10   L1-4: 10/10     2023  L1: 7/10, 10/10  L1-2: 9/10  L1-3: 7/10    2023  L1-4, 33%, 33%, 100%, 50%  L1-2: 80%, 100%   L1: 90%    Provided body parts (on paper), single letters, digits in 10's/100's place value, pt will demonstrate  understanding of \"point to/touch/pick up___\" commands 85% of trials. 2024  3-4 letter words focused around body parts and letters.  Pt able to add single phoneme and name word with phonemic errors.      Body parts: f6: 90% accy.    2023  [employer] Language Kristel  Body parts F6: 9/10    2023  [employer] Language Kristel  Body parts F6: 9/10  10 place value numbers 7/, F10.      2023  [employer] Language Kristel  Body parts F4: 9/10    2023  Letter ID: F4 7/10    2023  Letter ID: F4 5/10     Education/Strategy Training 2023  Pt expectation was discussed this date that pt is expected to look at therapist mouth and produce vocal attempt.     Other 2023  Language kristel- reading phrase completion F4: 8/10    2023  F4 Matching 2 word phrases to single picture: 8/10  Phrase completion: F2- 9/10    2023  Pt reports losing weight because unable to cook and read cook books,  Not enjoying food people bring.    2023  Pt was able to share that Thelma's dog .       Judie Sams    6-2-3 [facility] [place].                                 "

## 2024-01-05 NOTE — PROGRESS NOTES
Occupational Therapy Visit    OT DAILY NOTE FOR OUTPATIENT THERAPY    Patient: Jennifer Sams   MRN: [id number]  : 1945 78 y.o.  Referring Physician: Liv Haywood, Debra  Date of Visit: 2024      Certification Dates: 23 through 24    Diagnosis:   1. Acute ischemic left MCA stroke (CMS/HCC)    2. Muscle weakness (generalized)        Chief Complaints:   Brain Injury    Precautions:       TODAY'S VISIT    Time In Session:  Start Time: 1600  Stop Time: 1655  Time Calculation (min): 55 min   History/Vitals/Pain/Encounter Info - 24 1603        Injury History/Precautions/Daily Required Info    Document Type daily treatment     Primary Therapist Barry Rosas OTR/CARYN     Chief Complaint/Reason for Visit  Brain Injury     Onset of Illness/Injury or Date of Surgery 23     Referring Physician Dr. Haywood     History of present illness/functional impairment The patient is a 78-year-old  female with a history of AAA, heart block s/p cardiac pacemaker, glaucoma, hyperlipidemia, coronary artery disease, NSTEMI, who presented to [facility] on  after she has not been seen by her friends for a few days.  Patient lives alone and when she was found by EMS she was confused and combative.  In the emergency department she was aphasic with right-sided weakness and hypertensive to the 190 systolic.  CT scan of the head revealed an acute infarct in left MCA with mild bleeding.  Echo showed an EF of 43% and a bubble study was negative.  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  Later this was transitioned to apixaban.  She was continued on statin for secondary prevention.  She required virtual shivani while in the hospital but this was not effective.  She was evaluated by speech therapy and cleared for a soft and bite-size diet with moderately thick liquids.'     Patient/Family/Caregiver  Comments/Observations Patient arrives without complaint     Patient reported fall since last visit No        Pain Assessment    Currently in pain No/Denies        Pre Activity Vital Signs    /68     BP Location Left upper arm     BP Method Manual     Patient Position Sitting         Services    Do You Speak a Language Other Than English at Home? no                Daily Treatment Assessment and Plan - 01/04/24 1901        Daily Treatment Assessment and Plan    Progress toward goals Progressing     Daily Outcome Summary Patient seen in OP for improved functional use of RUE and able to effectively incorporate RUE with ball toss and return today. Patient able to provide adequate sustained force into soft ball to stabilize against wall with RUE, demonstrating improved proprioception. She demonstrates improvement with use of right hand and arm to roll therapy ball on wall with control. Patient demonstrates enlargement at right hand MCP joints and notes increased tenderness today; therapist took measurements and encouraged caregiver Thelma to f/u with PCP for further medical examination. She expressed good understanding.     Plan and Recommendations Focus on pre-grasp with repeition across several objects and widths                     OBJECTIVE DATA TAKEN TODAY    Skin and Sensation    Skin and Sensation - 01/04/24 1906        Girth Measurements    Other: Girth Measurement/Comments Right IF: P1-5.9 cm, PIP- 6cm, P2- 5.3 cm, DIP- 5.1 cm // Left IF: P1-5.6 cm, PIP-5.5 cm, P2- 4.9 cm, DIP-4.9 cm // Right MCP Jamul- 17.5 cm                 Today's Treatment:      OT NEURO FLOW SHEET    EXERCISES  Initial Evaluation  Progress Note 1  Progress Note 2  Re-evaluation CURRENT SESSION  8/16/2023  9/14/2023  10/23/23  11/14/23 TIME   NEURO RE-ED  CPT 33154 TOTAL TIME FOR SESSION 23-37 Minutes   AAROM/AROM     Strengthening Gentle RUE push into therapy ball with 5 second hold    Sustained gentle push into therapy ball  with right hand while performing item placement with LUE     Strength       Gross Motor Control [place] ball toss with use of BUE for catching and return toss    Controlled ball rolling on wall in vertical orientation and circular orientation, RUE    Tabletop ball rolls with varied speed and directions      Fine Motor Control     Sitting Jennifer/Balance     Standing Jennifer/Balance     Sensory re-education     Graded motor imagery      Retraining     THERE ACT  CPT 77528'  TOTAL TIME FOR SESSION 0-7 Minutes   Pain, Vitals, Meds, Etc. Vitals taken, pain assessed/monitored rest breaks provided     Assessment     HEP  12/15/23: Added towel grasp and fold exercise, right hand only; patient expressed good understanding.    11/2/23: Review pinch strengthening positions today for improved carry over    10/26/23: Added soft theraputty for right hand pinch (lateral pinch, tip pinch, 3 jaw patti); patient expressed good understnading    Discussed carry over with use of SF splint    9/28/23: Laterality exercise added to HEP; patient and caregiver expressed good understanding    Right hand stretching    9/21/2023:   -Educated on use of rice/bean sensory bin, massage, sensory re-education with deep pressure and textures. Issued tubigrip for RUE sensory re-education and advised to wear 1x per day. Continued to discuss edema management.     Functional Mobility     Transfers     THERE EX  CPT 63834 TOTAL TIME FOR SESSION Not performed   PROM     Activity Tolerance     SELF-CARE  CPT 19204 TOTAL TIME FOR SESSION 8-22 Minutes   Pt Education/Safety     ADL Training Patient practiced with incorporating use of right hand with donning mask      IADL Training Patient participated in activity to reinforce use of right hand with routine tasks such as opening closing door, pushing elevator button, waving hello, activating sensor, etc.    MODALITIES  CPT 48496 TOTAL TIME FOR SESSION Not performed   Ice/Heat     ATTENDED E-STIM  CPT 77394 TOTAL  TIME FOR SESSION Not performed   Attended E-Stim     SPLINTING  CPT 53880 TOTAL TIME FOR SESSION Not performed   Fabrication/Check Out     Fit     Training     GROUP  CPT 42035 TOTAL TIME FOR SESSION Not performed             Normative Range for Female 75+ Years of Age       Right Hand   Left Hand      Initial Evaluation Progress Note 1 Norm Initial Evaluation Progress Note 1 Norm    Strength 0 lbs 0 lbs 42.6 lbs 30 lbs 30 lbs 37.6 lbs   Lateral Pinch N/T 2 lbs 12.6 lbs N/T 14 lbs 11.4 lbs   Three Jaw Yonatan N/T 2 lbs (assist for position) 12 lbs N/T 11 lbs 11.5 lbs   Tip Pinch N/T N/T 9.6 lbs N/T 9 lbs 9.3 lbs   Box and Block 4 blocks 22 blocks 65 blocks 50 blocks 56 blocks 63.6 blocks   Nine Hole Peg (71+ age) Unable to complete Unable to complete 22.49 seconds N/T 24.55 seconds 24.11 seconds

## 2024-01-05 NOTE — OP OT TREATMENT LOG
OT NEURO FLOW SHEET    EXERCISES  Initial Evaluation  Progress Note 1  Progress Note 2  Re-evaluation CURRENT SESSION  8/16/2023  9/14/2023  10/23/23  11/14/23 TIME   NEURO RE-ED  CPT 67160 TOTAL TIME FOR SESSION 23-37 Minutes   AAROM/AROM     Strengthening Gentle RUE push into therapy ball with 5 second hold    Sustained gentle push into therapy ball with right hand while performing item placement with LUE     Strength       Gross Motor Control [place] ball toss with use of BUE for catching and return toss    Controlled ball rolling on wall in vertical orientation and circular orientation, RUE    Tabletop ball rolls with varied speed and directions      Fine Motor Control     Sitting Jennifer/Balance     Standing Jennifer/Balance     Sensory re-education     Graded motor imagery      Retraining     THERE ACT  CPT 80675'  TOTAL TIME FOR SESSION 0-7 Minutes   Pain, Vitals, Meds, Etc. Vitals taken, pain assessed/monitored rest breaks provided     Assessment     HEP  12/15/23: Added towel grasp and fold exercise, right hand only; patient expressed good understanding.    11/2/23: Review pinch strengthening positions today for improved carry over    10/26/23: Added soft theraputty for right hand pinch (lateral pinch, tip pinch, 3 jaw patti); patient expressed good understnading    Discussed carry over with use of SF splint    9/28/23: Laterality exercise added to HEP; patient and caregiver expressed good understanding    Right hand stretching    9/21/2023:   -Educated on use of rice/bean sensory bin, massage, sensory re-education with deep pressure and textures. Issued tubigrip for RUE sensory re-education and advised to wear 1x per day. Continued to discuss edema management.     Functional Mobility     Transfers     THERE EX  CPT 78376 TOTAL TIME FOR SESSION Not performed   PROM     Activity Tolerance     SELF-CARE  CPT 05999 TOTAL TIME FOR SESSION 8-22 Minutes   Pt Education/Safety     ADL Training Patient practiced with  incorporating use of right hand with donning mask      IADL Training Patient participated in activity to reinforce use of right hand with routine tasks such as opening closing door, pushing elevator button, waving hello, activating sensor, etc.    MODALITIES  CPT 13966 TOTAL TIME FOR SESSION Not performed   Ice/Heat     ATTENDED E-STIM  CPT 06307 TOTAL TIME FOR SESSION Not performed   Attended E-Stim     SPLINTING  CPT 06666 TOTAL TIME FOR SESSION Not performed   Fabrication/Check Out     Fit     Training     GROUP  CPT 83810 TOTAL TIME FOR SESSION Not performed             Normative Range for Female 75+ Years of Age       Right Hand   Left Hand      Initial Evaluation Progress Note 1 Norm Initial Evaluation Progress Note 1 Norm    Strength 0 lbs 0 lbs 42.6 lbs 30 lbs 30 lbs 37.6 lbs   Lateral Pinch N/T 2 lbs 12.6 lbs N/T 14 lbs 11.4 lbs   Three Jaw Yonatan N/T 2 lbs (assist for position) 12 lbs N/T 11 lbs 11.5 lbs   Tip Pinch N/T N/T 9.6 lbs N/T 9 lbs 9.3 lbs   Box and Block 4 blocks 22 blocks 65 blocks 50 blocks 56 blocks 63.6 blocks   Nine Hole Peg (71+ age) Unable to complete Unable to complete 22.49 seconds N/T 24.55 seconds 24.11 seconds

## 2024-01-11 ENCOUNTER — HOSPITAL ENCOUNTER (OUTPATIENT)
Dept: SPEECH THERAPY | Age: 79
Setting detail: THERAPIES SERIES
Discharge: HOME | End: 2024-01-11
Attending: PHYSICAL MEDICINE & REHABILITATION
Payer: MEDICARE

## 2024-01-11 ENCOUNTER — HOSPITAL ENCOUNTER (OUTPATIENT)
Dept: OCCUPATIONAL THERAPY | Age: 79
Setting detail: THERAPIES SERIES
Discharge: HOME | End: 2024-01-11
Attending: PHYSICAL MEDICINE & REHABILITATION
Payer: MEDICARE

## 2024-01-11 DIAGNOSIS — R27.9 LACK OF COORDINATION: ICD-10-CM

## 2024-01-11 DIAGNOSIS — M62.81 MUSCLE WEAKNESS (GENERALIZED): ICD-10-CM

## 2024-01-11 DIAGNOSIS — I63.9 CEREBROVASCULAR ACCIDENT (CVA), UNSPECIFIED MECHANISM (CMS/HCC): Primary | ICD-10-CM

## 2024-01-11 DIAGNOSIS — I63.512 ACUTE ISCHEMIC LEFT MCA STROKE (CMS/HCC): Primary | ICD-10-CM

## 2024-01-11 PROCEDURE — 97530 THERAPEUTIC ACTIVITIES: CPT | Mod: GO,59

## 2024-01-11 PROCEDURE — 97112 NEUROMUSCULAR REEDUCATION: CPT | Mod: GO,59

## 2024-01-11 PROCEDURE — 92507 TX SP LANG VOICE COMM INDIV: CPT | Mod: GN

## 2024-01-11 PROCEDURE — 97535 SELF CARE MNGMENT TRAINING: CPT | Mod: GO

## 2024-01-11 NOTE — OP SLP TREATMENT LOG
LANGUAGE SLP FLOW SHEET    SKILL AREA CURRENT SESSION   SPEECH THERAPY: LANGUAGE  CPT 04139    MOTOR SPEECH:  Provided Auditory bombardment, picture stimulus, direct visual/aud model, pt will produce target words with /p,b,m,w,t,d,n,s,l/ in initial position on first trial, 80% of presentations.  1/11/2024  Direct model: with [id number].   /p/  6/9, 7/9  /b/ 6/9 12/28/2023  CVC- repeated consonant   fading cues from auditory bombardment, direct model, picture cue  /b/- Mod A  /m/- Mod A  /p/- Mod A    12/19/2023  Aud bombardment CVC /d/  9/10 max A    12/14/2023  CVC  /b/ 10/11  /p/ 10/10  /m/ 8/9 12/7/2023  Practiced target sounds in numbers and letters.   Min cues for /p, b, m, w, s/  Mod A for /t, d, n, l/    11/30/2023  Auditory bombardment, picture, visual stimulus  /p,b/ CVC and CV words. 20 words with cues above.  Pt benefited from anticipation of looking at therapist face.      11/28/2023  Auditory bombardment, picture, visual stimulus  /p,b/ CVC and CV words. 20 words with cues above.    Practiced letter names: a,c, k, t, p,  d, ,m, j, u      Fading cues from above goal: Pt will produce CVCV, VCV and CVC syllables 80% of presentations.  1/11/2023  /b/ - CVC, CVCV- direct model    1/4/2024  Writing  3-5 letter words: letter fill in.  - become frustrated     Comprehension  Match oral word to written word F6: (body parts and clothes)- 66%  Match word to photograph F4: (animals, places, people) 9/10    Read word to photograph F4: 9/10     Reading  Read phrase F4 to photo: 9/10   Phrase completion: F4: 7/10    1/2/2024  Provided 3 letter words with letter fillins, improved naming of bodyparts and clothing items. ([place] Language ted- 3-4 letter words)    12/7/2023  Naming ted 18/20 word approximations provided clinician model.    11/30/2023  Practiced /M,b,p/ in CV and CVC single syllables. Pt continues to benefit from a prep/anticipatory set for initial phoneme placement prior to the direct  "model.    2023  VC    SPOKEN LANG EXP  Given simple picture cue pt will produce simple sentences of 3-4 words with simplified grammatical form and approximated content words to convey meaning, Min A, 80% of presentations.    2024  Advanced Language (comp) build  Difficulty with L5-6, +1 extra word- 40%  L5: Nouns, non-reversible: 2/5, 40%  L6: 1 pronoun, 1 nouns, non reversible : 2/5, 40%-Additional 3 with hint    2023  2-5 words unscramble to photograph.  90%:  1 noun, simple sentence 2/2  1 pronoun, simple sentence 2/2  1 noun present progressive 1/2  1 pronoun, present progressive 2/2  2 nouns, non-reversible 2/2    2023  Pt visually unscrambled 3 words to describe a picture on Advanced Comprehension kristel build- Noun- Simple Sentences.   Pt will produce simple automatics provided mod A:  1-20  A-J  MALLY 2024  1-12 Mod I  ABC: A-C (I)-A-J, 70% direct model  MALLY:  with direct model.     2023  Unison: 1-10,   Fading unison: 1-10  Unison: 1-   Unison: A-J, cues to look at therapist's lips   MALLY: one at a time with direct model    2023  Apraxia Kristel  1-10:8/10  MALLY:  0, improved with therapist isolating and repeating   A-J: 7/10 Mod to max A.    2023  Apraxia Kristel:  1-10: Improving  MALLY: improving  A-Z: improving with use of words at end of ApraxiaApp       Apraxia kristel  1-10:  1-5  MALLY: very difficult      Pt will produce simple social responses, Mod A:  Name     Address (parts) 2024  Name: Mod I  : - (S)  Address: \"[facility]\"     2023  Confrontational questions:pt produced-full name, (S),  Mod-Max A, Address Mod to Max A.     2023  Confrontational questions:pt produced-full name, (S),  Mod A     2023  Flashcards:   Pt says full name,  address, Mod to max A    2023  Pt produced first and last name,  Cues to produce Month, date, and year of , and address.      2023  Flash cards- Pt    SPOKEN LANG " "COMP  The pt will demonstrate understanding of 2 noun in simple and present and progressive reversible sentences F4, 80% accy.   (Advanced language comprehension L1-2 Identify)     12/28/2023  L6-7, F4 9/10  2 nouns, non-reversible passive 5/5  3 nouns non-reversible 4/5  L7-8, F4  3 nouns non-reversible 5/5  3 nouns reversible 5/5     12/19/2023  L 5-7: 8/10, L6-7:  2 nouns, non-reversible passive 4/4  2 nouns reversible passive  2/3, 2/5, 8/10  3 nouns non-reversible 2/3, 4/5    12/14/2023  F4  L1-4: 8/10 (quick)  L5: 9/10 2 nouns, non-reversible passive  L6: 7/10 2 nouns reversible passive  L7: 9/10, 3 nouns non-reversible    12/7/2023 Field of 2- next session increase to F4  L1: 10/10 (quick)   L2: 10/10 (quick)   L3: 10/10   L4: 10/10   L1-4: 10/10     11/30/2023  L1: 7/10, 10/10  L1-2: 9/10  L1-3: 7/10    11/28/2023  L1-4, 33%, 33%, 100%, 50%  L1-2: 80%, 100%   L1: 90%    Provided body parts (on paper), single letters, digits in 10's/100's place value, pt will demonstrate  understanding of \"point to/touch/pick up___\" commands 85% of trials. 1/2/2024  3-4 letter words focused around body parts and letters.  Pt able to add single phoneme and name word with phonemic errors.      Body parts: f6: 90% accy.    12/19/2023  [employer]actus Language Kristel  Body parts F6: 9/10    12/14/2023  Tactus Language Kristel  Body parts F6: 9/10  10 place value numbers 7/7, F10.      12/7/2023  Tactus Language Kristel  Body parts F4: 9/10    11/30/2023  Letter ID: F4 7/10    11/28/2023  Letter ID: F4 5/10     Education/Strategy Training 11/30/2023  Pt expectation was discussed this date that pt is expected to look at therapist mouth and produce vocal attempt.     Other 12/28/2023  Language kristel- reading phrase completion F4: 8/10    12/19/2023  F4 Matching 2 word phrases to single picture: 8/10  Phrase completion: F2- 9/10    12/14/2023  Pt reports losing weight because unable to cook and read cook books,  Not enjoying food people bring.  "   2023  Pt was able to share that Thelma's dog .       Judie Sams    6-2-3 [facility].

## 2024-01-12 ENCOUNTER — TELEPHONE (OUTPATIENT)
Dept: REGISTRATION | Facility: CLINIC | Age: 79
End: 2024-01-12
Payer: MEDICARE

## 2024-01-12 NOTE — OP OT TREATMENT LOG
OT NEURO FLOW SHEET    EXERCISES  Initial Evaluation  Progress Note 1  Progress Note 2  Re-evaluation CURRENT SESSION  8/16/2023  9/14/2023  10/23/23  11/14/23 TIME   NEURO RE-ED  CPT 14736 TOTAL TIME FOR SESSION 8-22 Minutes   AAROM/AROM Gentle AROM right fist open/close for warm-up    Strengthening      Strength       Gross Motor Control Patient participated in gross motor balance activity with wt bearing through right arm to weight shift to right side     Fine Motor Control     Sitting Jennifer/Balance     Standing Jennifer/Balance     Sensory re-education     Graded motor imagery      Retraining     THERE ACT  CPT 76050'  TOTAL TIME FOR SESSION 8-22 Minutes   Pain, Vitals, Meds, Etc. pain assessed/monitored rest breaks provided     Measurements updated at Right IF and MCP [place]    Assessment     HEP  12/15/23: Added towel grasp and fold exercise, right hand only; patient expressed good understanding.    11/2/23: Review pinch strengthening positions today for improved carry over    10/26/23: Added soft theraputty for right hand pinch (lateral pinch, tip pinch, 3 jaw patti); patient expressed good understnading    Discussed carry over with use of SF splint    9/28/23: Laterality exercise added to HEP; patient and caregiver expressed good understanding    Right hand stretching    9/21/2023:   -Educated on use of rice/bean sensory bin, massage, sensory re-education with deep pressure and textures. Issued tubigrip for RUE sensory re-education and advised to wear 1x per day. Continued to discuss edema management.     Functional Mobility     Transfers     THERE EX  CPT 96540 TOTAL TIME FOR SESSION Not performed   PROM     Activity Tolerance     SELF-CARE  CPT 47734 TOTAL TIME FOR SESSION 23-37 Minutes   Pt Education/Safety     ADL Training Patient participate in grasp and release with lightweight cup using right hand  - Graded up today with stacking/unstacking cups and using right hand to rotate cups    Patient  participated in use of spoon at right hand for simulated feeding activity with spooning small pegs into cup    IADL Training     MODALITIES  CPT 53522 TOTAL TIME FOR SESSION Not performed   Ice/Heat     ATTENDED E-STIM  CPT 72351 TOTAL TIME FOR SESSION Not performed   Attended E-Stim     SPLINTING  CPT 60410 TOTAL TIME FOR SESSION Not performed   Fabrication/Check Out     Fit     Training     GROUP  CPT 34803 TOTAL TIME FOR SESSION Not performed             Normative Range for Female 75+ Years of Age       Right Hand   Left Hand      Initial Evaluation Progress Note 1 Norm Initial Evaluation Progress Note 1 Norm    Strength 0 lbs 0 lbs 42.6 lbs 30 lbs 30 lbs 37.6 lbs   Lateral Pinch N/T 2 lbs 12.6 lbs N/T 14 lbs 11.4 lbs   Three Jaw Yonatan N/T 2 lbs (assist for position) 12 lbs N/T 11 lbs 11.5 lbs   Tip Pinch N/T N/T 9.6 lbs N/T 9 lbs 9.3 lbs   Box and Block 4 blocks 22 blocks 65 blocks 50 blocks 56 blocks 63.6 blocks   Nine Hole Peg (71+ age) Unable to complete Unable to complete 22.49 seconds N/T 24.55 seconds 24.11 seconds

## 2024-01-12 NOTE — PROGRESS NOTES
Occupational Therapy Visit    OT DAILY NOTE FOR OUTPATIENT THERAPY    Patient: Jennifer Sams   MRN: [id number]  : 1945 79 y.o.  Referring Physician: Liv Haywood, Debra  Date of Visit: 2024      Certification Dates: 23 through 24    Diagnosis:   1. Acute ischemic left MCA stroke (CMS/HCC)    2. Muscle weakness (generalized)    3. Lack of coordination        Chief Complaints:   Brain Injury    Precautions:       TODAY'S VISIT    Time In Session:  Start Time: 1705  Stop Time: 1800  Time Calculation (min): 55 min   History/Vitals/Pain/Encounter Info - 24 1707        Injury History/Precautions/Daily Required Info    Document Type daily treatment     Primary Therapist Barry Rosas OTR/CARYN     Chief Complaint/Reason for Visit  Brain Injury     Onset of Illness/Injury or Date of Surgery 23     Referring Physician Dr. Haywood     History of present illness/functional impairment The patient is a 78-year-old  female with a history of AAA, heart block s/p cardiac pacemaker, glaucoma, hyperlipidemia, coronary artery disease, NSTEMI, who presented to  on  after she has not been seen by her friends for a few days.  Patient lives alone and when she was found by EMS she was confused and combative.  In the emergency department she was aphasic with right-sided weakness and hypertensive to the 190 systolic.  CT scan of the head revealed an acute infarct in left MCA with mild bleeding.  Echo showed an EF of 43% and a bubble study was negative.  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  Later this was transitioned to apixaban.  She was continued on statin for secondary prevention.  She required virtual shivani while in the hospital but this was not effective.  She was evaluated by speech therapy and cleared for a soft and bite-size diet with moderately thick liquids.'      Patient/Family/Caregiver Comments/Observations Patient presents with decreased swelling at right IF today     Patient reported fall since last visit No        Pain Assessment    Currently in pain No/Denies         Services    Do You Speak a Language Other Than English at Home? no                Daily Treatment Assessment and Plan - 01/11/24 1919        Daily Treatment Assessment and Plan    Progress toward goals Progressing     Daily Outcome Summary Patient presents today with decreased swelling at right IF per measurements taken cold and focus of session on incorporated right hand with ADL activity. Patient able to grasp and place lightweight cup with right hand and use BUE to stack and unstack cups today. She was challenged by rotating cup due to limitations with in-hand manipulation but able to modulate force to prevent knocking cup over today. Patient able to use spoon in right hand to  and transfer small pegs from tabletop to small cup, which she was unable to perform previous in session. She continues to demonstrates intermittent dropping but with improved motor control at right hand.     Plan and Recommendations Focus on pre-grasp with repeition across several objects and widths                     OBJECTIVE DATA TAKEN TODAY    Skin and Sensation    Skin and Sensation - 01/11/24 1918        Girth Measurements    Other: Girth Measurement/Comments Right IF: P1-5.8 cm, PIP- 5.9cm, P2- 5.1 cm, DIP- 4.9 // Right MCP [place]- 17.5 cm                 Today's Treatment:      OT NEURO FLOW SHEET    EXERCISES  Initial Evaluation  Progress Note 1  Progress Note 2  Re-evaluation CURRENT SESSION  8/16/2023  9/14/2023  10/23/23  11/14/23 TIME   NEURO RE-ED  CPT 39978 TOTAL TIME FOR SESSION 8-22 Minutes   AAROM/AROM Gentle AROM right fist open/close for warm-up    Strengthening      Strength       Gross Motor Control Patient participated in gross motor balance activity with wt bearing through right  arm to weight shift to right side     Fine Motor Control     Sitting Jennifer/Balance     Standing Jennifer/Balance     Sensory re-education     Graded motor imagery      Retraining     THERE ACT  CPT 82014'  TOTAL TIME FOR SESSION 8-22 Minutes   Pain, Vitals, Meds, Etc. pain assessed/monitored rest breaks provided     Measurements updated at Right IF and MCP [place]    Assessment     HEP  12/15/23: Added towel grasp and fold exercise, right hand only; patient expressed good understanding.    11/2/23: Review pinch strengthening positions today for improved carry over    10/26/23: Added soft theraputty for right hand pinch (lateral pinch, tip pinch, 3 jaw patti); patient expressed good understnading    Discussed carry over with use of SF splint    9/28/23: Laterality exercise added to HEP; patient and caregiver expressed good understanding    Right hand stretching    9/21/2023:   -Educated on use of rice/bean sensory bin, massage, sensory re-education with deep pressure and textures. Issued tubigrip for RUE sensory re-education and advised to wear 1x per day. Continued to discuss edema management.     Functional Mobility     Transfers     THERE EX  CPT 78888 TOTAL TIME FOR SESSION Not performed   PROM     Activity Tolerance     SELF-CARE  CPT 67698 TOTAL TIME FOR SESSION 23-37 Minutes   Pt Education/Safety     ADL Training Patient participate in grasp and release with lightweight cup using right hand  - Graded up today with stacking/unstacking cups and using right hand to rotate cups    Patient participated in use of spoon at right hand for simulated feeding activity with spooning small pegs into cup    IADL Training     MODALITIES  CPT 58659 TOTAL TIME FOR SESSION Not performed   Ice/Heat     ATTENDED E-STIM  CPT 89876 TOTAL TIME FOR SESSION Not performed   Attended E-Stim     SPLINTING  CPT 12835 TOTAL TIME FOR SESSION Not performed   Fabrication/Check Out     Fit     Training     GROUP  CPT 56569 TOTAL TIME FOR SESSION Not  performed             Normative Range for Female 75+ Years of Age       Right Hand   Left Hand      Initial Evaluation Progress Note 1 Norm Initial Evaluation Progress Note 1 Norm    Strength 0 lbs 0 lbs 42.6 lbs 30 lbs 30 lbs 37.6 lbs   Lateral Pinch N/T 2 lbs 12.6 lbs N/T 14 lbs 11.4 lbs   Three Jaw Yonatan N/T 2 lbs (assist for position) 12 lbs N/T 11 lbs 11.5 lbs   Tip Pinch N/T N/T 9.6 lbs N/T 9 lbs 9.3 lbs   Box and Block 4 blocks 22 blocks 65 blocks 50 blocks 56 blocks 63.6 blocks   Nine Hole Peg (71+ age) Unable to complete Unable to complete 22.49 seconds N/T 24.55 seconds 24.11 seconds

## 2024-01-14 NOTE — PROGRESS NOTES
Speech-Language Pathology Progress Note      SLP PROGRESS NOTE FOR OUTPATIENT THERAPY    Patient: Jennifer Sams MRN: [id number]  : 1945 79 y.o.  Referring Physician: Liv Haywood, Debra  Date of Visit: 2024      Certification Dates: 23 through 24    Recommended Frequency & Duration:  2 times/week for up to 3 months   PN: 2024    Diagnosis:   1. Cerebrovascular accident (CVA), unspecified mechanism (CMS/HCC)        Chief Complaints:  Chief Complaint   Patient presents with   • Cognition   • Speech Problem     Aphasia, Apraxia        Precautions:   Precautions Comments: aphasia, apraxia, pacemaker    TODAY'S VISIT:    Session Time:  Start Time: 1600  Stop Time: 1700  Time Calculation (min): 60 min   General Information - 24        Session Details    Document Type progress note     Mode of Treatment individual therapy        General Information    Onset of Illness/Injury or Date of Surgery 23     Referring Physician Dr. Wang     Precautions Comments aphasia, apraxia, pacemaker     Limitations/Impairments safety/cognitive     Patient/Family/Caregiver Comments/Observations Pt reports concerns with reduced opportunity for communication.                Daily Falls Screen - 24 1605        Daily Falls Assessment    Patient reported fall since last visit No                Pain and Vitals - 24 1605        Pain Assessment    Currently in pain No/Denies                SLP - 24        Speech Therapy    Speech Therapy Specialty Traditional [facility]        SLP Frequency and Duration    Frequency of treatment 2 times/week     Speech Duration 3 months     SLP Custom Frequency and Duration PN: 2024     SLP Cert From 23     SLP Cert To 24     Date SLP POC was sent to provider 23     Signed SLP Plan of Care received?  Yes                Assessment - 24        Assessment    Plan of Care reviewed and patient/family  in agreement Yes     Clinical Assessment Ms. Sams continues to make progress toward goals for motor speech, expressive and receptive language, presenting with moderate deficits in expressive language complicated by motor speech and mild to moderate deficits in auditory comprehension.  Ms. Latrell farris continue to benefit from speech therapy to improve articulatory precision, syntax and improve auditory comprehension.     Plan and Recommendations CVC and CVCV with tip alveolar.  Describe a picture.     [employer] CPT 02305 Speech Lang Voice Comm and/or Auditory Proc (Treatment of speech, language, voice, communication and/or hearing processing disorder)                 OBJECTIVE MEASUREMENTS/DATA:    None Taken    Outcome Measures        8/16/2023    17:42 8/25/2023    16:57 11/14/2023    10:15 12/14/2023    17:09   SLP Outcome Measures   FCM: Motor Speech 2-->Level 2       Goal L4  3-->Level 3       approaching 4 4-->Level 4   FCM: Reading 3-->Level 3       Goal: maintain 3, indirect therapy, improve to 4.      FCM: Spoken Language, Comprehension --        TBD, suspect L2, goal, indirect therapy-4 2-->Level 2 4-->Level 4 5-->Level 5   FCM: Spoken Language, Expression 2-->Level 2  3-->Level 3       approaching 4 4-->Level 4       Today's Treatment::     Education provided:  Yes: See treatment log for details of education provided  Methods: Discussion  Readiness: acceptance  Response: Needs reinforcement    LANGUAGE SLP FLOW SHEET    SKILL AREA CURRENT SESSION   SPEECH THERAPY: LANGUAGE  CPT 38677    MOTOR SPEECH:  Provided Auditory bombardment, picture stimulus, direct visual/aud model, pt will produce target words with /p,b,m,w,t,d,n,s,l/ in initial position on first trial, 80% of presentations.  1/11/2024  Direct model: with [id number].   /p/  6/9, 7/9  /b/ 6/9 12/28/2023  CVC- repeated consonant   fading cues from auditory bombardment, direct model, picture cue  /b/- Mod A  /m/- Mod A  /p/-  Mod A    12/19/2023  Aud bombardment CVC /d/  9/10 max A    12/14/2023  CVC  /b/ 10/11  /p/ 10/10  /m/ 8/9 12/7/2023  Practiced target sounds in numbers and letters.   Min cues for /p, b, m, w, s/  Mod A for /t, d, n, l/    11/30/2023  Auditory bombardment, picture, visual stimulus  /p,b/ CVC and CV words. 20 words with cues above.  Pt benefited from anticipation of looking at therapist face.      11/28/2023  Auditory bombardment, picture, visual stimulus  /p,b/ CVC and CV words. 20 words with cues above.    Practiced letter names: a,c, k, t, p,  d, ,m, j, u      Fading cues from above goal: Pt will produce CVCV, VCV and CVC syllables 80% of presentations.  1/11/2023  /b/ - CVC, CVCV- direct model    1/4/2024  Writing  3-5 letter words: letter fill in.  - become frustrated     Comprehension  Match oral word to written word F6: (body parts and clothes)- 66%  Match word to photograph F4: (animals, places, people) 9/10    Read word to photograph F4: 9/10     Reading  Read phrase F4 to photo: 9/10   Phrase completion: F4: 7/10    1/2/2024  Provided 3 letter words with letter fillins, improved naming of bodyparts and clothing items. (Vayable Language ted- 3-4 letter words)    12/7/2023  Naming ted 18/20 word approximations provided clinician model.    11/30/2023  Practiced /M,b,p/ in CV and CVC single syllables. Pt continues to benefit from a prep/anticipatory set for initial phoneme placement prior to the direct model.    11/28/2023  VC    SPOKEN LANG EXP  Given simple picture cue pt will produce simple sentences of 3-4 words with simplified grammatical form and approximated content words to convey meaning, Min A, 80% of presentations.    1/4/2024  Advanced Language (comp) build  Difficulty with L5-6, +1 extra word- 40%  L5: Nouns, non-reversible: 2/5, 40%  L6: 1 pronoun, 1 nouns, non reversible : 2/5, 40%-Additional 3 with hint    12/28/2023  2-5 words unscramble to photograph.  90%:  1 noun, simple sentence 2/2  1  "pronoun, simple sentence 2/2  1 noun present progressive 1/2  1 pronoun, present progressive 2/2  2 nouns, non-reversible 2/2    2023  Pt visually unscrambled 3 words to describe a picture on Advanced Comprehension kristel build- Noun- Simple Sentences.   Pt will produce simple automatics provided mod A:  1-20  A-J  MALLY 2024  1-12 Mod I  ABC: A-C (I)-A-J, 70% direct model  MALLY:  with direct model.     2023  Unison: 1-10,   Fading unison: 1-10  Unison: 1-   Unison: A-J, cues to look at therapist's lips   MALLY: one at a time with direct model    2023  Apraxia Kristel  1-10:8/10  MALLY:  , improved with therapist isolating and repeating   A-J: 7/10 Mod to max A.    2023  Apraxia Kristel:  1-10: Improving  MALLY: improving  A-Z: improving with use of words at end of ApraxiaApp       Apraxia kristel  1-10:  1-5  MALLY: very difficult      Pt will produce simple social responses, Mod A:  Name     Address (parts) 2024  Name: Mod I  : - (S)  Address: \"[facility]\"     2023  Confrontational questions:pt produced-full name, (S),  Mod-Max A, Address Mod to Max A.     2023  Confrontational questions:pt produced-full name, (S),  Mod A     2023  Flashcards:   Pt says full name,  address, Mod to max A    2023  Pt produced first and last name,  Cues to produce Month, date, and year of , and address.      2023  Flash cards- Pt    SPOKEN LANG COMP  The pt will demonstrate understanding of 2 noun in simple and present and progressive reversible sentences F4, 80% accy.   (Advanced language comprehension L1-2 Identify)     2023  L6-7, F4 9/10  2 nouns, non-reversible passive 5/5  3 nouns non-reversible 4/5  L7-8, F4  3 nouns non-reversible /  3 nouns reversible 2023  L 5-7: 8/10, L6-7:  2 nouns, non-reversible passive   2 nouns reversible passive  2/3, 2, 8/10  3 nouns non-reversible 2/3, 4/5    2023  F4  L1-4: 8/10 " "(quick)  L5: 9/10 2 nouns, non-reversible passive  L6: 7/10 2 nouns reversible passive  L7: 9/10, 3 nouns non-reversible    2023 Field of 2- next session increase to F4  L1: 10/10 (quick)   L2: 10/10 (quick)   L3: 10/10   L4: 10/10   L1-4: 10/10     2023  L1: 7/10, 10/10  L1-2: 9/10  L1-3: 7/10    2023  L1-4, 33%, 33%, 100%, 50%  L1-2: 80%, 100%   L1: 90%    Provided body parts (on paper), single letters, digits in 10's/100's place value, pt will demonstrate  understanding of \"point to/touch/pick up___\" commands 85% of trials. 2024  3-4 letter words focused around body parts and letters.  Pt able to add single phoneme and name word with phonemic errors.      Body parts: f6: 90% accy.    2023  [employer] Language Kristel  Body parts F6: 9/10    2023  [employer] Language Kristel  Body parts F6: 9/10  10 place value numbers 7/, F10.      2023  [employer] Language Kristel  Body parts F4: 9/10    2023  Letter ID: F4 7/10    2023  Letter ID: F4 5/10     Education/Strategy Training 2023  Pt expectation was discussed this date that pt is expected to look at therapist mouth and produce vocal attempt.     Other 2023  Language kristel- reading phrase completion F4: 8/10    2023  F4 Matching 2 word phrases to single picture: 8/10  Phrase completion: F2- 9/10    2023  Pt reports losing weight because unable to cook and read cook books,  Not enjoying food people bring.    2023  Pt was able to share that Thelma's dog .       Judie Sams    6-2-3 [facility].            Goals Addressed                 This Visit's Progress    • SLP Motor Goals           NEW /Revised RE=Evaluation: 2023   Goals Short-Long Time Frame Result Comment   Will improve FCM in the following:  MOTOR SPEECH  Current:3-4  Goal:4-5, revised to 5-6  SPOKEN LANG COMP  Current:4  Goal: 5, revised to 6  SPOKEN LANGUAGE EXP:  Current:3  Goal:4, revised to 5 LTG 12 w Met, " "revise to increase FCM PN: 2024  Motor Speech: 4  Spoken lang comp: 5  Spoken lang expression:4     Pt will improve Aphasia Severity Rating Scale from 2/5 to >/=3/5 LTG 12 w     MOTOR SPEECH:  Provided Auditory bombardment, picture stimulus, direct visual/aud model, pt will produce target words with /p,b,m,w,t,d,n,s,l/ in initial position on first trial, 80% of presentations.  STG 4-6 w Continue  PN: 2024     Fading cues from above goal: Pt will produce CVCV, VCV and CVC syllables 80% of presentations.  STG 6-8 w Continue   PN: 2023     SPOKEN LANG EXP  Given simple picture cue pt will produce simple sentences of 3-4 words with simplified grammatical form and approximated content words to convey meaning, Min A, 80% of presentations.  STG 6-8 Continue PN:2024  Unscrambled 2-5 words to describe picture 90%   Pt will produce simple automatics provided mod A:  1-20  A-J  MALLY   STG 6-8 Continue PN: 2024  1-12: Improving, Mod I  MALLY: improving 71%, DM  ABC: A-C (I)-A-J, 70% direct model     Pt will produce simple social responses, Mod A:(revised  (S)  Name    Address (parts) STG 4-6 MET, revise to (S) PN: 2024  Full name- (S)  : Month/date (S)  Address:Mod A   SPOKEN LANG COMP  The pt will demonstrate understanding of 2 noun in simple and present and progressive reversible sentences F4, 80% accy.   (Advanced language L1-2 Identify)  STG 4-6 w MET PN: 2024    Continue with 3 nouns.        Provided body parts (on paper), single letters, digits in 10's/100's place value, pt will demonstrate  understanding of \"point to/touch/pick up___\" commands 85% of trials.  STG  Partially met PN: 2024  Body parts F6: 9/10  10 place value numbers 7/7, F10.    FCM MOTOR SPEECH  L4: In simple structured conversation with familiar communication partners, the individual can produce simple words and phrases intelligibly.  The individual usually requires moderate cueing in order to produce simple " sentences intelligibly, although accuracy may vary.         FCM: Spoken Language Expression   L4: The individual is successfully able to initiate communication using spoken language in simple, structured conversation in routine daily activities with familiar communication partners.  The individual usually requires moderate cueing, but is able to demonstrate use of simple sentences (ie, semantics, syntax, and morphology) and rarely uses complex sentences/messages.        FCM: Spoken Language Comprehension  L5: The individual is able to understand communication in structured conversations with both familiar and unfamiliar communication partners.  The individual occasionally requires minimal cueing to understand more complex sentences/messages.  The individual occasionally initiates the use of compensatory strategies when encountering difficulty.                Time Frame  Result  Comment/Progress    Will improve FCM in the following:  MOTOR SPEECH  Current:3  Goal:4-5  SPOKEN LANG COMP  Current:2  Goal: 2-3 LTG- 12 w Partially Met RE:: 11/14/2023  MOTOR SPEECH: 3, emerging to 4    SPOKEN LANG COMP:  4     Demonstrate effective speech intelligibility to communicate at single word and functional phrase level with (min A) use of compensatory strategies with familiar listeners  LTG- 12 w MET RE:: 11/14/2023  Aphasia Severity scale, 2/5     Pt will produce bilabials /p, b, m, w/, tip alveolars /t, d, n/ and tense vowels /a, e, I, o,u/ in isolation provided mod A, 80% of trials.  STG-4w   Partially Met  RE:: 11/14/2023  Isolation.  Provided direct model with visual cues pt can produce, but inconsistent due to difficulty understanding need to imitate.   Vowels: MET   Pt will produce VC and CV syllables provided the above phonemes, Mod A, 80% of trials STG- 6-8 w   Partially Met  RE:: 11/14/2023  Benefits from visual picture, direct model and visual cues.    Pt will produce single and bisyllablic words consisting of CVCV, VCV  "and CVC, Mod A, 80% of presentations.  STG 8-10   Partially Met  RE:: 2023  Benefits from visual picture, direct model and visual cues and initial phonemic cue for 88% accuracy.   Pt will produce simple automatics provided mod A:  1-10  A-G  Yolis and identifiable syllable combinations for MALLY  STG- 8-10  Partially Met, continue with goal  RE:: 2023  1-10, S--min A  A-/6 min A  Mon-Sun- Min A    Yolis- MET      Pt will produce small set of functional words/phrases in unison or in imitation with SLP, then produce 3  successive attempts with 80% accuracy  STG 8-10   Not Met, improving  RE:: 2023  75% provided DM, fade cues.  Improving with reading from flash cards. Pt has not been able to perform 3 productions.    Auditory Comprehension and reading to be further assessed for determination of goals to support speech production    GOAL: 2023  Given direct model, repetition and NV reinforcement, Pt will follow 1-step commands for motor speech production up to 1-2 syllable words, 80% of presentations.     The pt will demonstrate understanding of 1 noun in simple and present and progressive sentences F4, 80% accy.   (Advanced language L1-2 Identify)                 4-6 w              MET          MET            New  10/17/23              PN: 10/17/2023          RE: 2023  Present progressive: 100%  2 nouns non-[id number]%  NEXT 2 nouns reversible.    GOAL: 2023  Provided pictures/objects, and/or body parts, pt will demonstrate  understanding of \"point to___\" and \" ___\" 85% of trials.     8 w        Partially Met          RE:: 2023  Pictures and objects,    F4:100%  Body parts: 80%   NEW:   Pt will answer Y/N questions to general knowledge questions 90% of presentations.  New: 10/17/2023 Met RE:: 2023                       "

## 2024-01-18 ENCOUNTER — HOSPITAL ENCOUNTER (OUTPATIENT)
Dept: OCCUPATIONAL THERAPY | Age: 79
Setting detail: THERAPIES SERIES
Discharge: HOME | End: 2024-01-18
Attending: PHYSICAL MEDICINE & REHABILITATION
Payer: MEDICARE

## 2024-01-18 ENCOUNTER — HOSPITAL ENCOUNTER (OUTPATIENT)
Dept: SPEECH THERAPY | Age: 79
Setting detail: THERAPIES SERIES
Discharge: HOME | End: 2024-01-18
Attending: PHYSICAL MEDICINE & REHABILITATION
Payer: MEDICARE

## 2024-01-18 DIAGNOSIS — I63.512 ACUTE ISCHEMIC LEFT MCA STROKE (CMS/HCC): ICD-10-CM

## 2024-01-18 DIAGNOSIS — M62.81 MUSCLE WEAKNESS (GENERALIZED): ICD-10-CM

## 2024-01-18 DIAGNOSIS — I63.9 CEREBROVASCULAR ACCIDENT (CVA), UNSPECIFIED MECHANISM (CMS/HCC): Primary | ICD-10-CM

## 2024-01-18 DIAGNOSIS — I63.512 ACUTE ISCHEMIC LEFT MCA STROKE (CMS/HCC): Primary | ICD-10-CM

## 2024-01-18 DIAGNOSIS — R27.9 LACK OF COORDINATION: ICD-10-CM

## 2024-01-18 PROCEDURE — 92507 TX SP LANG VOICE COMM INDIV: CPT | Mod: GN

## 2024-01-18 PROCEDURE — 97530 THERAPEUTIC ACTIVITIES: CPT | Mod: GO,59

## 2024-01-18 PROCEDURE — 97112 NEUROMUSCULAR REEDUCATION: CPT | Mod: GO,59

## 2024-01-18 ASSESSMENT — 9 HOLE PEG TEST
TEST_RESULT: 180
TESTTIME_SECONDS: 25.84

## 2024-01-18 NOTE — OP SLP TREATMENT LOG
"LANGUAGE SLP FLOW SHEET    SKILL AREA CURRENT SESSION   SPEECH THERAPY: LANGUAGE  CPT 29105    MOTOR SPEECH:  Provided Auditory bombardment, picture stimulus, direct visual/aud model, pt will produce target words with /p,b,m,w,t,d,n,s,l/ in initial position on first trial, 80% of presentations.  1/18/2024 (I) naming provided pictures and words.   /p /80%, provided a visual picture stimulus  /b/ 70% provided a visual picture stimulus    /t/ isolation- unable   /t/ CV: direct model, 50% of presentation limited to CV, sent home practice words.     1/11/2024  Direct model: with [id number].   /p/  6/9, 7/9  /b/ 6/9      Fading cues from above goal: Pt will produce CVCV, VCV and CVC syllables 80% of presentations.  1/11/2023  /b/ - CVC, CVCV- direct model       SPOKEN LANG EXP  Given simple picture cue pt will produce simple sentences of 3-4 words with simplified grammatical form and approximated content words to convey meaning, Min A, 80% of presentations.    1/18/2024  Verb ted: \"test\" direct model required, articulatory imprecision noted.    Aud comp F4 verb identification: 10/10  Reading comp: 6/10 (I),therapist read words 8/10.     1/4/2024  Advanced Language (comp) build  Difficulty with L5-6, +1 extra word- 40%  L5: Nouns, non-reversible: 2/5, 40%  L6: 1 pronoun, 1 nouns, non reversible : 2/5, 40%-Additional 3 with hint    12/28/2023  2-5 words unscramble to photograph.  90%:  1 noun, simple sentence 2/2  1 pronoun, simple sentence 2/2  1 noun present progressive 1/2  1 pronoun, present progressive 2/2  2 nouns, non-reversible 2/2    12/7/2023  Pt visually unscrambled 3 words to describe a picture on Advanced Comprehension ted build- Noun- Simple Sentences.   Pt will produce simple automatics provided mod A:  1-20  A-J  MALLY 1/18/2024  MALLY with slow direct model 7/7 1/11/2024  1-12 Mod I  ABC: A-C (I)-A-J, 70% direct model  MALLY: 5/7 with direct model.     12/28/2023  Unison: 1-10,   Fading unison: " "1-10  Unison:    Unison: A-J, cues to look at therapist's lips   MALLY: one at a time with direct model    2023  Apraxia Kristel  1-10:8/10  MALLY:  0, improved with therapist isolating and repeating   A-J: 7/10 Mod to max A.    2023  Apraxia Kristel:  1-10: Improving  MALLY: improving  A-Z: improving with use of words at end of ApraxiaApp       Apraxia kristel  1-10:  1-5  MALLY: very difficult      Pt will produce simple social responses, Mod A:  Name     Address (parts) 2024  Name: Mod I  : - (S)  Address: \"[address]\"        SPOKEN LANG COMP  The pt will demonstrate understanding of 2 noun in simple and present and progressive reversible sentences F4, 80% accy.   (Advanced language comprehension L1-2 Identify) 2023  L6-7, F4 9/10  2 nouns, non-reversible passive 5/5  3 nouns non-reversible 4/5  L7-8, F4  3 nouns non-reversible 5/5  3 nouns reversible 5/5        Provided body parts (on paper), single letters, digits in 10's/100's place value, pt will demonstrate  understanding of \"point to/touch/pick up___\" commands 85% of trials. 2024  3-4 letter words focused around body parts and letters.  Pt able to add single phoneme and name word with phonemic errors.      Body parts: f6: 90% accy.    2023  [employer] Language Kristel  Body parts F6: 9/10    2023  [employer] Language Kristel  Body parts F6: 9/10  10 place value numbers 7/, F10.      2023  [employer] Language Kristel  Body parts F4: 9/10    2023  Letter ID: F4 7/10    2023  Letter ID: F4 5/10     Education/Strategy Training 2023  Pt expectation was discussed this date that pt is expected to look at therapist mouth and produce vocal attempt.     Other 2023  Language kristel- reading phrase completion F4: 8/10    2023  F4 Matching 2 word phrases to single picture: 8/10  Phrase completion: F2- 9/10    2023  Pt reports losing weight because unable to cook and read cook books,  Not enjoying food " people bring.    2023  Pt was able to share that Thelma's dog .

## 2024-01-18 NOTE — PROGRESS NOTES
Speech-Language Pathology Visit      SLP DAILY NOTE FOR OUTPATIENT THERAPY    Patient: Jennifer Sams   MRN: [id number]  : 1945 79 y.o.  Referring Physician: Liv Haywood, Debra  Date of Visit: 2024      Certification Dates: 23 through 24    Diagnosis:   1. Cerebrovascular accident (CVA), unspecified mechanism (CMS/HCC)    2. Acute ischemic left MCA stroke (CMS/HCC)        Chief Complaints:  apraxia, aphasia    Precautions:      TODAY'S VISIT:    Session Time:  Start Time: 1600  Stop Time: 1700  Time Calculation (min): 60 min   History/Vitals/Pain/Encounter Info - 24 1602        Injury History/Precautions/Daily Required Info    Primary Therapist Magnolia Lundy MS,CCC/SLP     Chief Complaint/Reason for Visit  apraxia, aphasia     Onset of Illness/Injury or Date of Surgery 23     Referring Physician Dr. Wang     Limitations/Impairments safety/cognitive     Document Type daily treatment     Patient/Family/Caregiver Comments/Observations Therapist recommended singing along to music to support motor speech.  Pt states decreased opportunity.     Patient reported fall since last visit No        Pain Assessment    Currently in pain No/Denies                Daily Treatment Assessment and Plan - 24 1602        Daily Treatment Assessment and Plan    Progress toward goals Progressing     Daily Outcome Summary Pt difficulty with producing isolated bilabial with direct model.  Pt continues to express not understanding the purpose and does not engage fully.  This continues to be an obstacle in heirarchy therapy approach.     Plan and Recommendations initial /b,p, t, d/ bombardment, phoneme isolation production,  CV and CVC words.  Pt provided a green folder to initiate home exercise program.                  OBJECTIVE MEASUREMENTS TAKEN TODAY:    None Taken    Today's Treatment:    LANGUAGE SLP FLOW SHEET    SKILL AREA CURRENT SESSION   SPEECH THERAPY: LANGUAGE  CPT 81631   "  MOTOR SPEECH:  Provided Auditory bombardment, picture stimulus, direct visual/aud model, pt will produce target words with /p,b,m,w,t,d,n,s,l/ in initial position on first trial, 80% of presentations.  1/18/2024 (I) naming provided pictures and words.   /p /80%, provided a visual picture stimulus  /b/ 70% provided a visual picture stimulus    /t/ isolation- unable   /t/ CV: direct model, 50% of presentation limited to CV, sent home practice words.     1/11/2024  Direct model: with [id number].   /p/  6/9, 7/9  /b/ 6/9      Fading cues from above goal: Pt will produce CVCV, VCV and CVC syllables 80% of presentations.  1/11/2023  /b/ - CVC, CVCV- direct model       SPOKEN LANG EXP  Given simple picture cue pt will produce simple sentences of 3-4 words with simplified grammatical form and approximated content words to convey meaning, Min A, 80% of presentations.    1/18/2024  Verb ted: \"test\" direct model required, articulatory imprecision noted.    Aud comp F4 verb identification: 10/10  Reading comp: 6/10 (I),therapist read words 8/10.     1/4/2024  Advanced Language (comp) build  Difficulty with L5-6, +1 extra word- 40%  L5: Nouns, non-reversible: 2/5, 40%  L6: 1 pronoun, 1 nouns, non reversible : 2/5, 40%-Additional 3 with hint    12/28/2023  2-5 words unscramble to photograph.  90%:  1 noun, simple sentence 2/2  1 pronoun, simple sentence 2/2  1 noun present progressive 1/2  1 pronoun, present progressive 2/2  2 nouns, non-reversible 2/2    12/7/2023  Pt visually unscrambled 3 words to describe a picture on Advanced Comprehension ted build- Noun- Simple Sentences.   Pt will produce simple automatics provided mod A:  1-20  A-J  MALLY 1/18/2024  MALLY with slow direct model 7/7 1/11/2024  1-12 Mod I  ABC: A-C (I)-A-J, 70% direct model  MALLY: 5/7 with direct model.     12/28/2023  Unison: 1-10,   Fading unison: 1-10  Unison: 1-14   Unison: A-J, cues to look at therapist's lips   MALLY: one at a time with " "direct model    2023  Apraxia Kristel  1-10:8/10  MALLY:  0, improved with therapist isolating and repeating   A-J: 7/10 Mod to max A.    2023  Apraxia Kristel:  1-10: Improving  MALLY: improving  A-Z: improving with use of words at end of ApraxiaApp       Apraxia kristel  1-10:  1-5  MALLY: very difficult      Pt will produce simple social responses, Mod A:  Name     Address (parts) 2024  Name: Mod I  : - (S)  Address: \"[address]\"        SPOKEN LANG COMP  The pt will demonstrate understanding of 2 noun in simple and present and progressive reversible sentences F4, 80% accy.   (Advanced language comprehension L1-2 Identify) 2023  L6-7, F4 9/10  2 nouns, non-reversible passive 5/5  3 nouns non-reversible 4/5  L7-8, F4  3 nouns non-reversible 5/5  3 nouns reversible 5/5        Provided body parts (on paper), single letters, digits in 10's/100's place value, pt will demonstrate  understanding of \"point to/touch/pick up___\" commands 85% of trials. 2024  3-4 letter words focused around body parts and letters.  Pt able to add single phoneme and name word with phonemic errors.      Body parts: f6: 90% accy.    2023  Tactus Language Kristel  Body parts F6: 9/10    2023  Tactus Language Kristel  Body parts F6: 9/10  10 place value numbers 7/7, F10.      2023  Tactus Language Kristel  Body parts F4: 9/10    2023  Letter ID: F4 7/10    2023  Letter ID: F4 5/10     Education/Strategy Training 2023  Pt expectation was discussed this date that pt is expected to look at therapist mouth and produce vocal attempt.     Other 2023  Language kristel- reading phrase completion F4: 8/10    2023  F4 Matching 2 word phrases to single picture: 8/10  Phrase completion: F2- 9/10    2023  Pt reports losing weight because unable to cook and read cook books,  Not enjoying food people bring.    2023  Pt was able to share that Thelma's dog .   "

## 2024-01-19 NOTE — PROGRESS NOTES
Occupational Therapy Progress Note    OT PROGRESS NOTE FOR OUTPATIENT THERAPY    Patient: Jennifer Sams MRN: [id number]  : 1945 79 y.o.  Referring Physician: Liv Haywood, Debra  Date of Visit: 2024      Certification Dates:   23 through 24    Recommended Frequency & Duration:  2 times/week for up to 3 months        Diagnosis:   1. Acute ischemic left MCA stroke (CMS/HCC)    2. Muscle weakness (generalized)    3. Lack of coordination        Chief Complaints:  No chief complaint on file.      Precautions:       TODAY'S VISIT:    Time In Session:  Start Time: 1705  Stop Time: 1800  Time Calculation (min): 55 min   General Information - 24 1709        Session Details    Document Type progress note        General Information    Onset of Illness/Injury or Date of Surgery 23     Referring Physician Dr. Haywood     History of present illness/functional impairment The patient is a 78-year-old  female with a history of AAA, heart block s/p cardiac pacemaker, glaucoma, hyperlipidemia, coronary artery disease, NSTEMI, who presented to [facility] on  after she has not been seen by her friends for a few days.  Patient lives alone and when she was found by EMS she was confused and combative.  In the emergency department she was aphasic with right-sided weakness and hypertensive to the 190 systolic.  CT scan of the head revealed an acute infarct in left MCA with mild bleeding.  Echo showed an EF of 43% and a bubble study was negative.  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  Later this was transitioned to apixaban.  She was continued on statin for secondary prevention.  She required virtual shivani while in the hospital but this was not effective.  She was evaluated by speech therapy and cleared for a soft and bite-size diet with moderately thick liquids.'      Patient/Family/Caregiver Comments/Observations Patient reports stiffness at right hand especially at IF         Services    Do You Speak a Language Other Than English at Home? no                  Pain/Vitals - 01/18/24 1709        Pain Assessment    Currently in pain No/Denies                OT - 01/19/24 0827        Occupational Therapy Encounter Type Details    Occupational Therapy Specialty Traditional Neuro Program OT        OT Frequency and Duration    Frequency of treatment 2 times/week     OT Duration 3 months     OT Cert From 11/14/23     OT Cert To 02/12/24     Date OT POC was sent to provider 11/14/23     Signed OT Plan of Care received?  Yes                Assessment - 01/19/24 0829        Assessment    Plan of Care reviewed and patient/family in agreement Yes     System Pathology/Pathophysiology Noted neuromuscular     Functional Limitations in Following Categories self-care;home management;community/leisure     Problem List: Occupational Therapy coordination impaired;impaired cognition;decreased flexibility;sensation decreased     Rehab Potential/Prognosis: Occupational Therapy good, to achieve stated therapy goals     Clinical Assessment Patient was seen in OP OT for improved functional use of RUE s/p CVA and progress evaluation was conducted today. Patient was seen for initial OT evaluation on 8/16/2023 and re-evaluated on 11/14/23, she has participated in 28 Occupational Therapy sessions, including this visit. At initial evaluation she presented with deficits related to right hand range of motion, fine motor coordination at RUE, right hand dexterity, right hand  and pinch strength and performance with ADLs and IADLs. The following objective measures were administered today: Patient able to grasp and place 5 pegs in pegboard during allotted 3 minutes of 9 hole pegboard (baseline: 3 pegs) indicating improving fine motor coordination but with continued limitation at right hand, Box and  Block test score is decreased at 23 blocks (re-eval: 28),  strength at right hand is 5 lbs average (re-eval: 6 lbs). Measurements taken today appear to be impacted by stiffness and pain with use at right hand which has become an issue over the past few weeks; therapist will reach out to primary caregiver Thelma to facilitate following up with PCP. Subjective discussion of ADL/IADL performance limited due to apasia however patient reports I or mod I for all ADLs at this time, including increased independence with bathing and washing hair. She continues to rely on LUE for self-care tasks due to deficits at RUE and stiffness/pain at right hand. Recommend continued OT 2x/week for 3 months to facilitate improved functional use of RUE for ADL, IADL and leisure activities.     Plan and Recommendations Focus on pre-grasp with repeition across several objects and widths                 OBJECTIVE MEASUREMENTS/DATA:    Skin and Sensation    Skin and Sensation - 01/18/24 1711        Girth Measurements    Other: Girth Measurement/Comments Right IF: P1-5.9 cm, PIP- 5.9cm, P2- 5.2 cm, DIP- 4.9 // Right MCP [place]- 17.5 cm          .5 cm MF to DPC               Range of Motion      BADL/IADL    BADL/IADL - 01/18/24 1711        BADL Interventions Assessment    Upper Body Dressing Modified independence     Lower Body Dressing Modified independence     Lower body dressing comments Intermittent success with tying shoes; taking 10 minutes to get dressing     Bathing Modified independence     Bathing comments Using shower chair and grab bars, reliant on use of LUE     Toileting Independent     Grooming Modified independence     Grooming comments Usually automatic toothbrush. Able to brush hair but difficulty with blowing hair     Eating Modified independence     Eating comments Reliant on LUE        IADL/Home Interventions Assessment    Care of Others Independent     Care of Others comments Using LUE to clean litterbox, feed and water      Home management/maintenance Independent     Home management/maintenance comments Difficulty with hand washing dishes, using LUE for dusting     Meal prep / clean up Maximum assist (25% patient effort)     Meal prep / clean up comments Able to reheat in microwave but not preparing food at this time, hot or cold     Other (comments) Friends bring food               Gross and Fine Motor     Gross and Fine Motor - 01/18/24 1711        Hand  Strength Testing    Left Hand, Setting 2 30 lbs     Right Hand, Setting 2 5 lbs   limited due to pain              Outcome Measures    Outcome Measures - 01/18/24 1711        Objective Outcome Measures    RIGHT hand: Box and Blocks Assessment 23     9 Hole Peg Test - RIGHT HAND TIME 180   14.82- removal only    9 Hole Peg Test - LEFT HAND TIME 25.84   removing only    9 Hole Peg Test - COMMENTS Patient able to place 5 pegs in pegboard in 3 minutes at right hand                 ROM and MMT        9/14/2023    18:07 11/28/2023    16:29 1/4/2024    19:06 1/11/2024    19:18 1/18/2024    17:11   OT Cervical/Lumbar/Other ROM Measurements   Other: Girth Measurement/Comments   Right IF: P1-5.9 cm, PIP- 6cm, P2- 5.3 cm, DIP- 5.1 cm // Left IF: P1-5.6 cm, PIP-5.5 cm, P2- 4.9 cm, DIP-4.9 cm // Right MCP [place]- 17.5 cm Right IF: P1-5.8 cm, PIP- 5.9cm, P2- 5.1 cm, DIP- 4.9 // Right MCP [place]- 17.5 cm Right IF: P1-5.9 cm, PIP- 5.9cm, P2- 5.2 cm, DIP- 4.9 // Right MCP [place]- 17.5 cm          .5 cm MF to DPC   OT UE MMT   Right Shoulder Flexion (4+/5) good plus       Left Shoulder Flexion (4+/5) good plus (4+/5) good plus      Right Shoulder Extension (4+/5) good plus       Left Shoulder Extension (4+/5) good plus (4+/5) good plus      Right Shoulder ADD (4+/5) good plus       Left Shoulder ADD (4+/5) good plus (4+/5) good plus      Right Shoulder ABD (4+/5) good plus       Left Shoulder ABD (4+/5) good plus (4+/5) good plus      Right Shoulder IR (3+/5) fair plus       Left Shoulder IR  (4+/5) good plus (4+/5) good plus      Right Shoulder ER (4/5) good       Left Shoulder ER (4+/5) good plus (4+/5) good plus      Right Elbow Flexion (4+/5) good plus       Left Elbow Flexion (4+/5) good plus (4+/5) good plus      Right Elbow Extension (4+/5) good plus       Left Elbow Extension (4+/5) good plus (4+/5) good plus      Right Forearm Supination (4/5) good       Left Forearm Supination (4+/5) good plus (4+/5) good plus      Right Forearm Pronation (4/5) good       Left Forearm Pronation (4+/5) good plus (4+/5) good plus      Right Wrist Flexion (3+/5) fair plus       Left Wrist Flexion (4/5) good (4/5) good      Right Wrist Extension (3+/5) fair plus       Left Wrist Extension (4/5) good (4/5) good      Right Wrist UD (2/5) poor       Left Wrist UD (4/5) good (4/5) good      Right Wrist RD (2/5) poor       Left Wrist RD (4/5) good (4/5) good        Outcome Measures        10/24/2023    14:18 10/26/2023    14:01 11/2/2023    13:20 11/14/2023    14:20 12/14/2023    17:12 1/18/2024    17:11   OT OBJECTIVE Outcome Measures   9 Hole Peg Test - Right Hand  48.6       Removing pegs only 30.88       peg removal only 16.65       Removing only using lateral pinch 180       3 pegs placed 180       14.82- removal only   9 Hole Peg Test - Left Hand    24.06  25.84       removing only   9 Hole Peg Test - Comments      Patient able to place 5 pegs in pegboard in 3 minutes at right hand   Right Hand Box and Blocks 18   53 24 23   Left Hand Box and Blocks    28     Other Outcome Measure    Right MF to DPC = 1.5 cm         Today's Treatment::    Education provided:  Yes: See treatment log for details of education provided  Methods: Discussion  Readiness: acceptance and eager  Response: Demonstrated understanding      OT NEURO FLOW SHEET    EXERCISES  Initial Evaluation  Progress Note 1  Progress Note 2  Re-evaluation CURRENT SESSION  8/16/2023  9/14/2023  10/23/23  11/14/23 TIME   NEURO RE-ED  CPT 87623 TOTAL TIME FOR  SESSION 8-22 Minutes   AAROM/AROM Measured at right hand    Strengthening      Strength   Assessed for PN    Gross Motor Control Assessed for PN    Fine Motor Control Assessed for PN    Sitting Jennifer/Balance     Standing Jennifer/Balance     Sensory re-education     Graded motor imagery      Retraining     THERE ACT  CPT 87549'  TOTAL TIME FOR SESSION 38-52 Minutes   Pain, Vitals, Meds, Etc. Vitals taken, pain assessed/monitored rest breaks provided     Measurements updated at Right IF and MCP [place]    Assessment Patient was 100% full participant in OT progress evaluation today. See attached note for details.    HEP  12/15/23: Added towel grasp and fold exercise, right hand only; patient expressed good understanding.    11/2/23: Review pinch strengthening positions today for improved carry over    10/26/23: Added soft theraputty for right hand pinch (lateral pinch, tip pinch, 3 jaw patti); patient expressed good understnading    Discussed carry over with use of SF splint    9/28/23: Laterality exercise added to HEP; patient and caregiver expressed good understanding    Right hand stretching    9/21/2023:   -Educated on use of rice/bean sensory bin, massage, sensory re-education with deep pressure and textures. Issued tubigrip for RUE sensory re-education and advised to wear 1x per day. Continued to discuss edema management.     Functional Mobility     Transfers     THERE EX  CPT 27211 TOTAL TIME FOR SESSION Not performed   PROM     Activity Tolerance     SELF-CARE  CPT 47874 TOTAL TIME FOR SESSION 0-7 Minutes   Pt Education/Safety Discussed with patient and co-learner the important of seeing physician regarding onset of pain at right hand, limiting frequency of therapeutic exercise with HEP. Patient and co-learner expressed good understanding.    ADL Training     IADL Training     MODALITIES  CPT 70944 TOTAL TIME FOR SESSION Not performed   Ice/Heat     ATTENDED E-STIM  CPT 08357 TOTAL TIME FOR SESSION Not performed    Attended E-Stim     SPLINTING  CPT 91788 TOTAL TIME FOR SESSION Not performed   Fabrication/Check Out     Fit     Training     GROUP  CPT 38850 TOTAL TIME FOR SESSION Not performed             Normative Range for Female 75+ Years of Age       Right Hand   Left Hand      Initial Evaluation Progress Note 1 Norm Initial Evaluation Progress Note 1 Norm    Strength 0 lbs 0 lbs 42.6 lbs 30 lbs 30 lbs 37.6 lbs   Lateral Pinch N/T 2 lbs 12.6 lbs N/T 14 lbs 11.4 lbs   Three Jaw Yonatan N/T 2 lbs (assist for position) 12 lbs N/T 11 lbs 11.5 lbs   Tip Pinch N/T N/T 9.6 lbs N/T 9 lbs 9.3 lbs   Box and Block 4 blocks 22 blocks 65 blocks 50 blocks 56 blocks 63.6 blocks   Nine Hole Peg (71+ age) Unable to complete Unable to complete 22.49 seconds N/T 24.55 seconds 24.11 seconds            Goals Addressed                 This Visit's Progress    • OT Neuro/Deconditioning Goals          Short Term Goals Time Frame Result Comment/Progress   Assess gross motor control via Box and Block Assessment  4 weeks Met 9/14: R: 22; L: 56   Assess fine motor control via 9 hole peg test 4 weeks Met  9/14: R: Unable; L: 24.55   Improved automatic fine motor use at right hand with routine tasks (e.g. eating, writing, brushing teeth) to 25% of baseline  4 weeks Ongoing     Explore adaptive visual strategies to encourage looking to right side 4 weeks Met    Incorporate right hand in at least 2 self-care activities 4 weeks Met    Carry over HEP at least 3 times per week  4 weeks Met     Score at least 32 on Box and block test 4 weeks Ongoing    Increase right hand  strength to 8 lbs 4 weeks Met       Long Term Goals Time Frame Result Comment/Progress   Improve gross motor control as evidenced by at least 10 block improved at RUE with Box and Block Assessment for improved ability to perform ADLs and IADLs  12 weeks Met     Patient will complete 9 hole peg test in under 3 minutes 12 weeks Ongoing    Decrease distance from Right MF to DPC to 2 cm  for improved functional grasp at right hand 12 weeks Met     Improved automatic fine motor use at right hand with routine tasks (e.g. eating, writing, brushing teeth) to 75% of baseline 12 weeks Ongoing     Increase right hand  strength to at least 15 lbs 12 weeks Ongoing    Increase Box and Block score to at least 40 12 weeks Ongoing

## 2024-01-19 NOTE — OP OT TREATMENT LOG
OT NEURO FLOW SHEET    EXERCISES  Initial Evaluation  Progress Note 1  Progress Note 2  Re-evaluation CURRENT SESSION  8/16/2023  9/14/2023  10/23/23  11/14/23 TIME   NEURO RE-ED  CPT 54677 TOTAL TIME FOR SESSION 8-22 Minutes   AAROM/AROM Measured at right hand    Strengthening      Strength   Assessed for PN    Gross Motor Control Assessed for PN    Fine Motor Control Assessed for PN    Sitting Jennifer/Balance     Standing Jennifer/Balance     Sensory re-education     Graded motor imagery      Retraining     THERE ACT  CPT 07743'  TOTAL TIME FOR SESSION 38-52 Minutes   Pain, Vitals, Meds, Etc. Vitals taken, pain assessed/monitored rest breaks provided     Measurements updated at Right IF and MCP [place]    Assessment Patient was 100% full participant in OT progress evaluation today. See attached note for details.    HEP  12/15/23: Added towel grasp and fold exercise, right hand only; patient expressed good understanding.    11/2/23: Review pinch strengthening positions today for improved carry over    10/26/23: Added soft theraputty for right hand pinch (lateral pinch, tip pinch, 3 jaw patti); patient expressed good understnading    Discussed carry over with use of SF splint    9/28/23: Laterality exercise added to HEP; patient and caregiver expressed good understanding    Right hand stretching    9/21/2023:   -Educated on use of rice/bean sensory bin, massage, sensory re-education with deep pressure and textures. Issued tubigrip for RUE sensory re-education and advised to wear 1x per day. Continued to discuss edema management.     Functional Mobility     Transfers     THERE EX  CPT 65411 TOTAL TIME FOR SESSION Not performed   PROM     Activity Tolerance     SELF-CARE  CPT 18494 TOTAL TIME FOR SESSION 0-7 Minutes   Pt Education/Safety Discussed with patient and co-learner the important of seeing physician regarding onset of pain at right hand, limiting frequency of therapeutic exercise with HEP. Patient and  co-learner expressed good understanding.    ADL Training     IADL Training     MODALITIES  CPT 63533 TOTAL TIME FOR SESSION Not performed   Ice/Heat     ATTENDED E-STIM  CPT 32127 TOTAL TIME FOR SESSION Not performed   Attended E-Stim     SPLINTING  CPT 43656 TOTAL TIME FOR SESSION Not performed   Fabrication/Check Out     Fit     Training     GROUP  CPT 38833 TOTAL TIME FOR SESSION Not performed             Normative Range for Female 75+ Years of Age       Right Hand   Left Hand      Initial Evaluation Progress Note 1 Norm Initial Evaluation Progress Note 1 Norm    Strength 0 lbs 0 lbs 42.6 lbs 30 lbs 30 lbs 37.6 lbs   Lateral Pinch N/T 2 lbs 12.6 lbs N/T 14 lbs 11.4 lbs   Three Jaw Yonatan N/T 2 lbs (assist for position) 12 lbs N/T 11 lbs 11.5 lbs   Tip Pinch N/T N/T 9.6 lbs N/T 9 lbs 9.3 lbs   Box and Block 4 blocks 22 blocks 65 blocks 50 blocks 56 blocks 63.6 blocks   Nine Hole Peg (71+ age) Unable to complete Unable to complete 22.49 seconds N/T 24.55 seconds 24.11 seconds

## 2024-01-23 ENCOUNTER — HOSPITAL ENCOUNTER (OUTPATIENT)
Dept: SPEECH THERAPY | Age: 79
Setting detail: THERAPIES SERIES
Discharge: HOME | End: 2024-01-23
Attending: PHYSICAL MEDICINE & REHABILITATION
Payer: MEDICARE

## 2024-01-23 ENCOUNTER — HOSPITAL ENCOUNTER (OUTPATIENT)
Dept: OCCUPATIONAL THERAPY | Age: 79
Setting detail: THERAPIES SERIES
Discharge: HOME | End: 2024-01-23
Attending: PHYSICAL MEDICINE & REHABILITATION
Payer: MEDICARE

## 2024-01-23 DIAGNOSIS — I63.9 CEREBROVASCULAR ACCIDENT (CVA), UNSPECIFIED MECHANISM (CMS/HCC): Primary | ICD-10-CM

## 2024-01-23 DIAGNOSIS — R27.9 LACK OF COORDINATION: ICD-10-CM

## 2024-01-23 DIAGNOSIS — I63.512 ACUTE ISCHEMIC LEFT MCA STROKE (CMS/HCC): Primary | ICD-10-CM

## 2024-01-23 PROCEDURE — 97018 PARAFFIN BATH THERAPY: CPT | Mod: GO

## 2024-01-23 PROCEDURE — 97535 SELF CARE MNGMENT TRAINING: CPT | Mod: GO

## 2024-01-23 PROCEDURE — 97010 HOT OR COLD PACKS THERAPY: CPT | Mod: GO

## 2024-01-23 PROCEDURE — 97112 NEUROMUSCULAR REEDUCATION: CPT | Mod: GO,59

## 2024-01-23 PROCEDURE — 92507 TX SP LANG VOICE COMM INDIV: CPT | Mod: GN

## 2024-01-23 NOTE — OP SLP TREATMENT LOG
"LANGUAGE SLP FLOW SHEET    SKILL AREA CURRENT SESSION   SPEECH THERAPY: LANGUAGE  CPT 86454    MOTOR SPEECH:  Provided Auditory bombardment, picture stimulus, direct visual/aud model, pt will produce target words with /p,b,m,w,t,d,n,s,l/ in initial position on first trial, 80% of presentations.  1/23/2024  Bus, bird, bike, bear,bath, boat,   Pencil, pig, meagan, pickles, party, pony, poodle, pie, pants, parrot, peach, parrot, pizza,     Unable to produce /t, d/ in isolation.       1/18/2024 (I) naming provided pictures and words.   /p /80%, provided a visual picture stimulus  /b/ 70% provided a visual picture stimulus    /t/ isolation- unable   /t/ CV: direct model, 50% of presentation limited to CV, sent home practice words.     1/11/2024  Direct model: with [id number].   /p/  6/9, 7/9  /b/ 6/9      Fading cues from above goal: Pt will produce CVCV, VCV and CVC syllables 80% of presentations.  1/11/2023  /b/ - CVC, CVCV- direct model       SPOKEN LANG EXP  Given simple picture cue pt will produce simple sentences of 3-4 words with simplified grammatical form and approximated content words to convey meaning, Min A, 80% of presentations.    1/18/2024  Verb ted: \"test\" direct model required, articulatory imprecision noted.    Aud comp F4 verb identification: 10/10  Reading comp: 6/10 (I),therapist read words 8/10.     1/4/2024  Advanced Language (comp) build  Difficulty with L5-6, +1 extra word- 40%  L5: Nouns, non-reversible: 2/5, 40%  L6: 1 pronoun, 1 nouns, non reversible : 2/5, 40%-Additional 3 with hint    12/28/2023  2-5 words unscramble to photograph.  90%:  1 noun, simple sentence 2/2  1 pronoun, simple sentence 2/2  1 noun present progressive 1/2  1 pronoun, present progressive 2/2  2 nouns, non-reversible 2/2    12/7/2023  Pt visually unscrambled 3 words to describe a picture on Advanced Comprehension ted build- Noun- Simple Sentences.   Pt will produce simple automatics provided mod " "A:  1-20  A-J  MALLY 2024  MALLY with slow direct model 72024  1-12 Mod I  ABC: A-C (I)-A-J, 70% direct model  MALLY:  with direct model.     2023  Unison: 1-10,   Fading unison: 1-10  Unison: -   Unison: A-J, cues to look at therapist's lips   MALLY: one at a time with direct model    2023  Apraxia Kristel  1-10:8/10  MALLY:  0, improved with therapist isolating and repeating   A-J: 7/10 Mod to max A.    2023  Apraxia Kristel:  1-10: Improving  MALLY: improving  A-Z: improving with use of words at end of ApraxiaApp       Apraxia kristel  1-10:  1-5  MALLY: very difficult      Pt will produce simple social responses, Mod A:  Name     Address (parts) 2024  Name: Mod I  : - (S)  Address: \"[address]\"        SPOKEN LANG COMP  The pt will demonstrate understanding of 2 noun in simple and present and progressive reversible sentences F4, 80% accy.   (Advanced language comprehension L1-2 Identify)  Up to 7-8 2024  L7: F4- 3 nouns non reversible 100%  L8:  F4: 3 nouns reversible,60%, 90%    2023  L6-7, F4 /10  2 nouns, non-reversible passive 5/5  3 nouns non-reversible 4/5  L7-8, F4  3 nouns non-reversible 5/5  3 nouns reversible 5/5        Provided body parts (on paper), single letters, digits in 10's/100's place value, pt will demonstrate  understanding of \"point to/touch/pick up___\" commands 85% of trials. 2024  3-4 letter words focused around body parts and letters.  Pt able to add single phoneme and name word with phonemic errors.      Body parts: f6: 90% accy.    2023  Tactus Language Kristel  Body parts F6: 9/10    2023  Tactus Language Kristel  Body parts F6: 9/10  10 place value numbers , F10.      2023  Tactus Language Kristel  Body parts F4: 9/10    2023  Letter ID: F4 7/10    2023  Letter ID: F4 5/10     Education/Strategy Training 2023  Pt expectation was discussed this date that pt is expected to look at therapist mouth and " produce vocal attempt.     Other 2023  Language ted- reading phrase completion F4: 8/10    2023  F4 Matching 2 word phrases to single picture: 8/10  Phrase completion: F2- 9/10    2023  Pt reports losing weight because unable to cook and read cook books,  Not enjoying food people bring.    2023  Pt was able to share that Thelma's dog .

## 2024-01-23 NOTE — PROGRESS NOTES
Speech-Language Pathology Visit      SLP DAILY NOTE FOR OUTPATIENT THERAPY    Patient: Jennifer Sams   MRN: [id number]  : 1945 79 y.o.  Referring Physician: Liv Haywood, Debra  Date of Visit: 2024      Certification Dates: 23 through 24    Diagnosis:   1. Cerebrovascular accident (CVA), unspecified mechanism (CMS/HCC)        Chief Complaints:  apraxia, aphasia    Precautions:      TODAY'S VISIT:    Session Time:  Start Time: 1601  Stop Time: 1700  Time Calculation (min): 59 min   History/Vitals/Pain/Encounter Info - 24 1605        Injury History/Precautions/Daily Required Info    Primary Therapist Magnolia Lundy MS,CCC/SLP     Chief Complaint/Reason for Visit  apraxia, aphasia     Onset of Illness/Injury or Date of Surgery 23     Referring Physician Dr. Wang     Limitations/Impairments safety/cognitive     Document Type daily treatment     Patient reported fall since last visit No        Pain Assessment    Currently in pain No/Denies                Daily Treatment Assessment and Plan - 24 1605        Daily Treatment Assessment and Plan    Progress toward goals Progressing     Daily Outcome Summary Pt benefits when able to manage impulsivity to listen and watch for phoneme cues, and production.     Plan and Recommendations Advance language: comp- identify L8-9.  Continue with /p,/b, t, d/ consider /w/. Digit naming- repetition-single, double digit.                  OBJECTIVE MEASUREMENTS TAKEN TODAY:    None Taken    Today's Treatment:    LANGUAGE SLP FLOW SHEET    SKILL AREA CURRENT SESSION   SPEECH THERAPY: LANGUAGE  CPT 34591    MOTOR SPEECH:  Provided Auditory bombardment, picture stimulus, direct visual/aud model, pt will produce target words with /p,b,m,w,t,d,n,s,l/ in initial position on first trial, 80% of presentations.  2024  Bus, bird, bike, bear,bath, boat,   Pencil, pig, meagan, pickles, party, pony, poodle, pie, pants, parrot, peach, parrot,  "pizza,     Unable to produce /t, d/ in isolation.       1/18/2024 (I) naming provided pictures and words.   /p /80%, provided a visual picture stimulus  /b/ 70% provided a visual picture stimulus    /t/ isolation- unable   /t/ CV: direct model, 50% of presentation limited to CV, sent home practice words.     1/11/2024  Direct model: with [id number].   /p/  6/9, 7/9  /b/ 6/9      Fading cues from above goal: Pt will produce CVCV, VCV and CVC syllables 80% of presentations.  1/11/2023  /b/ - CVC, CVCV- direct model       SPOKEN LANG EXP  Given simple picture cue pt will produce simple sentences of 3-4 words with simplified grammatical form and approximated content words to convey meaning, Min A, 80% of presentations.    1/18/2024  Verb kristel: \"test\" direct model required, articulatory imprecision noted.    Aud comp F4 verb identification: 10/10  Reading comp: 6/10 (I),therapist read words 8/10.     1/4/2024  Advanced Language (comp) build  Difficulty with L5-6, +1 extra word- 40%  L5: Nouns, non-reversible: 2/5, 40%  L6: 1 pronoun, 1 nouns, non reversible : 2/5, 40%-Additional 3 with hint    12/28/2023  2-5 words unscramble to photograph.  90%:  1 noun, simple sentence 2/2  1 pronoun, simple sentence 2/2  1 noun present progressive 1/2  1 pronoun, present progressive 2/2  2 nouns, non-reversible 2/2    12/7/2023  Pt visually unscrambled 3 words to describe a picture on Advanced Comprehension kristel build- Noun- Simple Sentences.   Pt will produce simple automatics provided mod A:  1-20  A-J  MALLY 1/18/2024  MALLY with slow direct model 7/7 1/11/2024 1-12 Mod I  ABC: A-C (I)-A-J, 70% direct model  MALLY: 5/7 with direct model.     12/28/2023  Unison: 1-10,   Fading unison: 1-10  Unison: 1-14   Unison: A-J, cues to look at therapist's lips   MALLY: one at a time with direct model    12/19/2023  Apraxia Kristel  1-10:8/10  MALLY:  0/7, improved with therapist isolating and repeating   A-J: 7/10 Mod to max " "A.    2023  Apraxia Kristel:  1-10: Improving  MALLY: improving  A-Z: improving with use of words at end of ApraxiaApp       Apraxia kristel  1-10:  1-5  MALLY: very difficult      Pt will produce simple social responses, Mod A:  Name     Address (parts) 2024  Name: Mod I  : - (S)  Address: \"[address]\"        SPOKEN LANG COMP  The pt will demonstrate understanding of 2 noun in simple and present and progressive reversible sentences F4, 80% accy.   (Advanced language comprehension L1-2 Identify)  Up to 7-8 2024  L7: F4- 3 nouns non reversible 100%  L8:  F4: 3 nouns reversible,60%, 90%    2023  L6-7, F4 /10  2 nouns, non-reversible passive 5/5  3 nouns non-reversible 4/5  L7-8, F4  3 nouns non-reversible 5/5  3 nouns reversible 5/5        Provided body parts (on paper), single letters, digits in 10's/100's place value, pt will demonstrate  understanding of \"point to/touch/pick up___\" commands 85% of trials. 2024  3-4 letter words focused around body parts and letters.  Pt able to add single phoneme and name word with phonemic errors.      Body parts: f6: 90% accy.    2023  Tactus Language Kristel  Body parts F6: 9/10    2023  Tactus Language Kristel  Body parts F6: 9/10  10 place value numbers 7/7, F10.      2023  Tactus Language Kristel  Body parts F4: 9/10    2023  Letter ID: F4 7/10    2023  Letter ID: F4 5/10     Education/Strategy Training 2023  Pt expectation was discussed this date that pt is expected to look at therapist mouth and produce vocal attempt.     Other 2023  Language kristel- reading phrase completion F4: 8/10    2023  F4 Matching 2 word phrases to single picture: 8/10  Phrase completion: F2- 9/10    2023  Pt reports losing weight because unable to cook and read cook books,  Not enjoying food people bring.    2023  Pt was able to share that Thelma's dog .                                   "

## 2024-01-24 NOTE — PROGRESS NOTES
Occupational Therapy Visit    OT DAILY NOTE FOR OUTPATIENT THERAPY    Patient: Jennifer Sams   MRN: [id number]  : 1945 79 y.o.  Referring Physician: Liv Haywood, Debra  Date of Visit: 2024      Certification Dates: 23 through 24    Diagnosis:   1. Acute ischemic left MCA stroke (CMS/HCC)    2. Lack of coordination        Chief Complaints:   Brain Injury    Precautions:       TODAY'S VISIT    Time In Session:  Start Time: 1705  Stop Time: 1800  Time Calculation (min): 55 min   History/Vitals/Pain/Encounter Info - 24 1505        Injury History/Precautions/Daily Required Info    Document Type progress note     Primary Therapist Barry Rosas OTR/L     Chief Complaint/Reason for Visit  Brain Injury     Onset of Illness/Injury or Date of Surgery 23     Referring Physician Dr. Haywood     History of present illness/functional impairment The patient is a 78-year-old  female with a history of AAA, heart block s/p cardiac pacemaker, glaucoma, hyperlipidemia, coronary artery disease, NSTEMI, who presented to [facility] on  after she has not been seen by her friends for a few days.  Patient lives alone and when she was found by EMS she was confused and combative.  In the emergency department she was aphasic with right-sided weakness and hypertensive to the 190 systolic.  CT scan of the head revealed an acute infarct in left MCA with mild bleeding.  Echo showed an EF of 43% and a bubble study was negative.  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  Later this was transitioned to apixaban.  She was continued on statin for secondary prevention.  She required virtual shivani while in the hospital but this was not effective.  She was evaluated by speech therapy and cleared for a soft and bite-size diet with moderately thick liquids.'     Patient/Family/Caregiver  Comments/Observations Patient arrives accompanied by primary caregiver Thelma     Patient reported fall since last visit No        Pain Assessment    Currently in pain No/Denies         Services    Do You Speak a Language Other Than English at Home? no                Daily Treatment Assessment and Plan - 01/24/24 1158        Daily Treatment Assessment and Plan    Progress toward goals Progressing     Daily Outcome Summary Patient accompanied by caregiver Thelma who brings in adaptive glove with gentle heat and light vibration, discussion on safe and proper use during application of hot pack at right hand, and patient demonstrates good understanding. Constraint induced treatment utilized to increase right hand engagement in task-oriented activities and improvement was appreciated across repetitions including with turning door handle and operating TV remote with consistency. Patient continues to demonstrates hesitation with use of right hand and requires verbal and physical cueing at this time.     Plan and Recommendations Focus on pre-grasp with repeition across several objects and widths                     OBJECTIVE DATA TAKEN TODAY    None Taken    Today's Treatment:      OT NEURO FLOW SHEET    EXERCISES  Initial Evaluation  Progress Note 1  Progress Note 2  Re-evaluation CURRENT SESSION  8/16/2023  9/14/2023  10/23/23  11/14/23 TIME   NEURO RE-ED  CPT 32975 TOTAL TIME FOR SESSION 0-7 Minutes   AAROM/AROM AROM right hand composite flexion/extension in elevation    Strengthening      Strength       Gross Motor Control     Fine Motor Control     Sitting Jennifer/Balance     Standing Jennifer/Balance     Sensory re-education     Graded motor imagery      Retraining     THERE ACT  CPT 93473'  TOTAL TIME FOR SESSION 0-7 Minutes   Pain, Vitals, Meds, Etc. pain assessed/monitored      Assessment     HEP  12/15/23: Added towel grasp and fold exercise, right hand only; patient expressed good understanding.    11/2/23:  Review pinch strengthening positions today for improved carry over    10/26/23: Added soft theraputty for right hand pinch (lateral pinch, tip pinch, 3 jaw patti); patient expressed good understnading    Discussed carry over with use of SF splint    9/28/23: Laterality exercise added to HEP; patient and caregiver expressed good understanding    Right hand stretching    9/21/2023:   -Educated on use of rice/bean sensory bin, massage, sensory re-education with deep pressure and textures. Issued tubigrip for RUE sensory re-education and advised to wear 1x per day. Continued to discuss edema management.     Functional Mobility     Transfers     THERE EX  CPT 98383 TOTAL TIME FOR SESSION Not performed   PROM     Activity Tolerance     SELF-CARE  CPT 68681 TOTAL TIME FOR SESSION 38-52 Minutes   Pt Education/Safety Discussed safe and proper use of right hand glove for light heating and vibration. Patient able to yahaira independently and expressed good understanding of automatic shutoff for safe and proper use    ADL Training CI treatment focused on task-oriented activities including the following:  - Engaging right hand with sit to stand and stand to sit transfers  - Pushing in chair and pulling out chair with RUE  - Turning door handle and opening door, closing door with right hand  - Operating remote control with right hand    IADL Training     MODALITIES  CPT 06025 TOTAL TIME FOR SESSION 8-22 Minutes   Ice/Heat Hot pack wrapped in towel applied to right hand concurrent with discussion of adaptive glove for light heating and mild vibration    ATTENDED E-STIM  CPT 84906 TOTAL TIME FOR SESSION Not performed   Attended E-Stim     SPLINTING  CPT 22375 TOTAL TIME FOR SESSION Not performed   Fabrication/Check Out     Fit     Training     GROUP  CPT 73455 TOTAL TIME FOR SESSION Not performed             Normative Range for Female 75+ Years of Age       Right Hand   Left Hand      Initial Evaluation Progress Note 1 Norm Initial  Evaluation Progress Note 1 Norm    Strength 0 lbs 0 lbs 42.6 lbs 30 lbs 30 lbs 37.6 lbs   Lateral Pinch N/T 2 lbs 12.6 lbs N/T 14 lbs 11.4 lbs   Three Jaw Yonatan N/T 2 lbs (assist for position) 12 lbs N/T 11 lbs 11.5 lbs   Tip Pinch N/T N/T 9.6 lbs N/T 9 lbs 9.3 lbs   Box and Block 4 blocks 22 blocks 65 blocks 50 blocks 56 blocks 63.6 blocks   Nine Hole Peg (71+ age) Unable to complete Unable to complete 22.49 seconds N/T 24.55 seconds 24.11 seconds

## 2024-01-24 NOTE — OP OT TREATMENT LOG
OT NEURO FLOW SHEET    EXERCISES  Initial Evaluation  Progress Note 1  Progress Note 2  Re-evaluation CURRENT SESSION  8/16/2023  9/14/2023  10/23/23  11/14/23 TIME   NEURO RE-ED  CPT 75879 TOTAL TIME FOR SESSION 0-7 Minutes   AAROM/AROM AROM right hand composite flexion/extension in elevation    Strengthening      Strength       Gross Motor Control     Fine Motor Control     Sitting Jennifer/Balance     Standing Jennifer/Balance     Sensory re-education     Graded motor imagery      Retraining     THERE ACT  CPT 62503'  TOTAL TIME FOR SESSION 0-7 Minutes   Pain, Vitals, Meds, Etc. pain assessed/monitored      Assessment     HEP  12/15/23: Added towel grasp and fold exercise, right hand only; patient expressed good understanding.    11/2/23: Review pinch strengthening positions today for improved carry over    10/26/23: Added soft theraputty for right hand pinch (lateral pinch, tip pinch, 3 jaw patti); patient expressed good understnading    Discussed carry over with use of SF splint    9/28/23: Laterality exercise added to HEP; patient and caregiver expressed good understanding    Right hand stretching    9/21/2023:   -Educated on use of rice/bean sensory bin, massage, sensory re-education with deep pressure and textures. Issued tubigrip for RUE sensory re-education and advised to wear 1x per day. Continued to discuss edema management.     Functional Mobility     Transfers     THERE EX  CPT 86644 TOTAL TIME FOR SESSION Not performed   PROM     Activity Tolerance     SELF-CARE  CPT 21298 TOTAL TIME FOR SESSION 38-52 Minutes   Pt Education/Safety Discussed safe and proper use of right hand glove for light heating and vibration. Patient able to yahaira independently and expressed good understanding of automatic shutoff for safe and proper use    ADL Training CI treatment focused on task-oriented activities including the following:  - Engaging right hand with sit to stand and stand to sit transfers  - Pushing in chair and  pulling out chair with RUE  - Turning door handle and opening door, closing door with right hand  - Operating remote control with right hand    IADL Training     MODALITIES  CPT 57772 TOTAL TIME FOR SESSION 8-22 Minutes   Ice/Heat Hot pack wrapped in towel applied to right hand concurrent with discussion of adaptive glove for light heating and mild vibration    ATTENDED E-STIM  CPT 80169 TOTAL TIME FOR SESSION Not performed   Attended E-Stim     SPLINTING  CPT 85949 TOTAL TIME FOR SESSION Not performed   Fabrication/Check Out     Fit     Training     GROUP  CPT 89558 TOTAL TIME FOR SESSION Not performed             Normative Range for Female 75+ Years of Age       Right Hand   Left Hand      Initial Evaluation Progress Note 1 Norm Initial Evaluation Progress Note 1 Norm    Strength 0 lbs 0 lbs 42.6 lbs 30 lbs 30 lbs 37.6 lbs   Lateral Pinch N/T 2 lbs 12.6 lbs N/T 14 lbs 11.4 lbs   Three Jaw Yonatan N/T 2 lbs (assist for position) 12 lbs N/T 11 lbs 11.5 lbs   Tip Pinch N/T N/T 9.6 lbs N/T 9 lbs 9.3 lbs   Box and Block 4 blocks 22 blocks 65 blocks 50 blocks 56 blocks 63.6 blocks   Nine Hole Peg (71+ age) Unable to complete Unable to complete 22.49 seconds N/T 24.55 seconds 24.11 seconds

## 2024-01-25 ENCOUNTER — HOSPITAL ENCOUNTER (OUTPATIENT)
Dept: SPEECH THERAPY | Age: 79
Setting detail: THERAPIES SERIES
Discharge: HOME | End: 2024-01-25
Attending: PHYSICAL MEDICINE & REHABILITATION
Payer: MEDICARE

## 2024-01-25 ENCOUNTER — HOSPITAL ENCOUNTER (OUTPATIENT)
Dept: OCCUPATIONAL THERAPY | Age: 79
Setting detail: THERAPIES SERIES
Discharge: HOME | End: 2024-01-25
Attending: PHYSICAL MEDICINE & REHABILITATION
Payer: MEDICARE

## 2024-01-25 DIAGNOSIS — I63.9 CEREBROVASCULAR ACCIDENT (CVA), UNSPECIFIED MECHANISM (CMS/HCC): Primary | ICD-10-CM

## 2024-01-25 DIAGNOSIS — R27.9 LACK OF COORDINATION: ICD-10-CM

## 2024-01-25 DIAGNOSIS — I63.512 ACUTE ISCHEMIC LEFT MCA STROKE (CMS/HCC): Primary | ICD-10-CM

## 2024-01-25 PROCEDURE — 92507 TX SP LANG VOICE COMM INDIV: CPT | Mod: GN

## 2024-01-25 PROCEDURE — 97530 THERAPEUTIC ACTIVITIES: CPT | Mod: GO,59

## 2024-01-25 PROCEDURE — 97112 NEUROMUSCULAR REEDUCATION: CPT | Mod: GO,59

## 2024-01-25 RX ORDER — PNV NO.95/FERROUS FUM/FOLIC AC 28MG-0.8MG
1000 TABLET ORAL DAILY
COMMUNITY
End: 2024-08-12 | Stop reason: ENTERED-IN-ERROR

## 2024-01-25 NOTE — PROGRESS NOTES
Speech-Language Pathology Visit      SLP DAILY NOTE FOR OUTPATIENT THERAPY    Patient: Jennifer Sams   MRN: [id number]  : 1945 79 y.o.  Referring Physician: Liv Haywood, Debra  Date of Visit: 2024      Certification Dates: 23 through 24    Diagnosis:   1. Cerebrovascular accident (CVA), unspecified mechanism (CMS/HCC)        Chief Complaints:  apraxia, aphasia    Precautions:  Precautions Comments: aphasia, apraxia, pacemaker       TODAY'S VISIT:    Session Time:  Start Time: 1600  Stop Time: 1700  Time Calculation (min): 60 min   History/Vitals/Pain/Encounter Info - 24 1605        Injury History/Precautions/Daily Required Info    Primary Therapist Magnolia Lundy MS,CCC/SLP     Chief Complaint/Reason for Visit  apraxia, aphasia     Onset of Illness/Injury or Date of Surgery 23     Referring Physician Dr. Kathleen Agudelo Comments aphasia, apraxia, pacemaker     Limitations/Impairments safety/cognitive     Document Type daily treatment     Patient reported fall since last visit No        Pain Assessment    Currently in pain No/Denies                Daily Treatment Assessment and Plan - 24 1605        Daily Treatment Assessment and Plan    Progress toward goals Progressing     Daily Outcome Summary Pt auditory comp of body parts, digits and uppercase are improving.  Pt's speech production of automatics improved when engaged with therapist and/or ted verbal production.     Plan and Recommendations Continue for 90% with digit recognition, lowercase letter recognition, and advance comprehension -identify.                  OBJECTIVE MEASUREMENTS TAKEN TODAY:    None Taken    Today's Treatment:    LANGUAGE SLP FLOW SHEET    SKILL AREA CURRENT SESSION   SPEECH THERAPY: LANGUAGE  [facility] [id number]    MOTOR SPEECH:  Provided Auditory bombardment, picture stimulus, direct visual/aud model, pt will produce target words with /p,b,m,w,t,d,n,s,l/ in initial position on first  "trial, 80% of presentations.  1/23/2024  Bus, bird, bike, bear,bath, boat,   Pencil, pig, meagan, pickles, party, pony, poodle, pie, pants, parrot, peach, parrot, pizza,     Unable to produce /t, d/ in isolation.       1/18/2024 (I) naming provided pictures and words.   /p /80%, provided a visual picture stimulus  /b/ 70% provided a visual picture stimulus    /t/ isolation- unable   /t/ CV: direct model, 50% of presentation limited to CV, sent home practice words.     1/11/2024  Direct model: with [id number].   /p/  6/9, 7/9  /b/ 6/9      Fading cues from above goal: Pt will produce CVCV, VCV and CVC syllables 80% of presentations.  1/11/2023  /b/ - CVC, CVCV- direct model       SPOKEN LANG EXP  Given simple picture cue pt will produce simple sentences of 3-4 words with simplified grammatical form and approximated content words to convey meaning, Min A, 80% of presentations.    1/18/2024  Verb ted: \"test\" direct model required, articulatory imprecision noted.    Aud comp F4 verb identification: 10/10  Reading comp: 6/10 (I),therapist read words 8/10.     1/4/2024  Advanced Language (comp) build  Difficulty with L5-6, +1 extra word- 40%  L5: Nouns, non-reversible: 2/5, 40%  L6: 1 pronoun, 1 nouns, non reversible : 2/5, 40%-Additional 3 with hint    12/28/2023  2-5 words unscramble to photograph.  90%:  1 noun, simple sentence 2/2  1 pronoun, simple sentence 2/2  1 noun present progressive 1/2  1 pronoun, present progressive 2/2  2 nouns, non-reversible 2/2    12/7/2023  Pt visually unscrambled 3 words to describe a picture on Advanced Comprehension ted build- Noun- Simple Sentences.   Pt will produce simple automatics provided mod A:  1-20  A-J  MALLY 1/25/2024-continue  LEONEL BAL with apps - pt improved ability when looking at the ted or at clinician.     1/18/2024  MALLY with slow direct model 7/7 1/11/2024  1-12 Mod I  ABC: A-C (I)-A-J, 70% direct model  MALLY: 5/7 with direct model.     12/28/2023  Unison: " "-10,   Fading unison: 1-10  Unison:    Unison: A-J, cues to look at therapist's lips   AMLLY: one at a time with direct model    2023  Apraxia Kristel  1-10:8/10  MALLY:  0, improved with therapist isolating and repeating   A-J: 7/10 Mod to max A.    2023  Apraxia Kristel:  1-10: Improving  MALLY: improving  A-Z: improving with use of words at end of ApraxiaApp       Apraxia kristel  1-10:  1-5  MALLY: very difficult      Pt will produce simple social responses, Mod A:  Name     Address (parts) 2024- continue with address  Name: Mod I- took extra time due to being upset  : Month- Mod A, date mod A    2024  Name: Mod I  : - (S)  Address: \"[address]        SPOKEN LANG COMP  The pt will demonstrate understanding of 2 noun in simple and present and progressive reversible sentences F4, 80% accy.   (Advanced language comprehension L1-2 Identify)  Up to 7-8 2024- Met, increasing stimulus  L8:  F4: 3 nouns reversible, 90%  L9: 3 nouns, reversible, indirect object, 80%  L10: 3 nouns non-reversible , passive:     Increase to paragraphs.     2024  L7: F4- 3 nouns non reversible 100%  L8:  F4: 3 nouns reversible,60%, 90%    2023  L6-7, F4 9/10  2 nouns, non-reversible passive 5/  3 nouns non-reversible 4/  L7-8, F4  3 nouns non-reversible 5/5  3 nouns reversible 5/5      Provided body parts (on paper), single letters, digits in 10's/100's place value, pt will demonstrate  understanding of \"point to/touch/pick up___\" commands 85% of trials.    Number therapy kristel  Number flash cards.     2024- progressing  Body parts 4/5 Mod I, 5/5 (S)    Letter Id (uppercase) F4: 9/10  Letter ID (lowercase) F4: 5/10  Single digits F4: 8/10, 44/50- 88%     2024  3-4 letter words focused around body parts and letters.  Pt able to add single phoneme and name word with phonemic errors.      Body parts: f6: 90% accy.    2023  [employer] Language Kristel  Body parts F6: " 9/10    2023  [employer] Language Kristel  Body parts F6: 9/10  10 place value numbers 7/, F10.      2023  [employer] Language Kristel  Body parts F4: 9/10    2023  Letter ID: F4 7/10    2023  Letter ID: F4 5/10     Education/Strategy Training 2023  Pt expectation was discussed this date that pt is expected to look at therapist mouth and produce vocal attempt.     Other 2023  Language kristel- reading phrase completion F4: 8/10    2023  F4 Matching 2 word phrases to single picture: 8/10  Phrase completion: F2- 9/10    2023  Pt reports losing weight because unable to cook and read cook books,  Not enjoying food people bring.    2023  Pt was able to share that Thelma's dog .

## 2024-01-25 NOTE — OP SLP TREATMENT LOG
"LANGUAGE SLP FLOW SHEET    SKILL AREA CURRENT SESSION   SPEECH THERAPY: LANGUAGE  CPT 02296    MOTOR SPEECH:  Provided Auditory bombardment, picture stimulus, direct visual/aud model, pt will produce target words with /p,b,m,w,t,d,n,s,l/ in initial position on first trial, 80% of presentations.  1/23/2024  Bus, bird, bike, bear,bath, boat,   Pencil, pig, meagan, pickles, party, pony, poodle, pie, pants, parrot, peach, parrot, pizza,     Unable to produce /t, d/ in isolation.       1/18/2024 (I) naming provided pictures and words.   /p /80%, provided a visual picture stimulus  /b/ 70% provided a visual picture stimulus    /t/ isolation- unable   /t/ CV: direct model, 50% of presentation limited to CV, sent home practice words.     1/11/2024  Direct model: with [id number].   /p/  6/9, 7/9  /b/ 6/9      Fading cues from above goal: Pt will produce CVCV, VCV and CVC syllables 80% of presentations.  1/11/2023  /b/ - CVC, CVCV- direct model       SPOKEN LANG EXP  Given simple picture cue pt will produce simple sentences of 3-4 words with simplified grammatical form and approximated content words to convey meaning, Min A, 80% of presentations.    1/18/2024  Verb ted: \"test\" direct model required, articulatory imprecision noted.    Aud comp F4 verb identification: 10/10  Reading comp: 6/10 (I),therapist read words 8/10.     1/4/2024  Advanced Language (comp) build  Difficulty with L5-6, +1 extra word- 40%  L5: Nouns, non-reversible: 2/5, 40%  L6: 1 pronoun, 1 nouns, non reversible : 2/5, 40%-Additional 3 with hint    12/28/2023  2-5 words unscramble to photograph.  90%:  1 noun, simple sentence 2/2  1 pronoun, simple sentence 2/2  1 noun present progressive 1/2  1 pronoun, present progressive 2/2  2 nouns, non-reversible 2/2    12/7/2023  Pt visually unscrambled 3 words to describe a picture on Advanced Comprehension ted build- Noun- Simple Sentences.   Pt will produce simple automatics provided mod " "A:  1-20  A-J  MALLY 2024-continue  MALLY, LEONEL with apps - pt improved ability when looking at the kristel or at clinician.     2024  MALLY with slow direct model 2024  1-12 Mod I  ABC: A-C (I)-A-J, 70% direct model  MALLY:  with direct model.     2023  Unison: 1-10,   Fading unison: 1-10  Unison: 1-   Unison: A-J, cues to look at therapist's lips   MALLY: one at a time with direct model    2023  Apraxia Kristel  1-10:8/10  MALLY:  0, improved with therapist isolating and repeating   A-J: 7/10 Mod to max A.    2023  Apraxia Kristel:  1-10: Improving  MALLY: improving  A-Z: improving with use of words at end of ApraxiaApp       Apraxia kristel  1-10:  1-5  MALLY: very difficult      Pt will produce simple social responses, Mod A:  Name     Address (parts) 2024- continue with address  Name: Mod I- took extra time due to being upset  : Month- Mod A, date mod A    2024  Name: Mod I  : - (S)  Address: \"[facility]\"        SPOKEN LANG COMP  The pt will demonstrate understanding of 2 noun in simple and present and progressive reversible sentences F4, 80% accy.   (Advanced language comprehension L1-2 Identify)  Up to 7-8 2024- Met, increasing stimulus  L8:  F4: 3 nouns reversible, 90%  L9: 3 nouns, reversible, indirect object, 80%  L10: 3 nouns non-reversible , passive:     Increase to paragraphs.     2024  L7: F4- 3 nouns non reversible 100%  L8:  F4: 3 nouns reversible,60%, 90%    2023  L6-7, F4 9/10  2 nouns, non-reversible passive 5/5  3 nouns non-reversible 4/5  L7-8, F4  3 nouns non-reversible 5/5  3 nouns reversible 5/5      Provided body parts (on paper), single letters, digits in 10's/100's place value, pt will demonstrate  understanding of \"point to/touch/pick up___\" commands 85% of trials.    Number therapy kristel  Number flash cards.     2024- progressing  Body parts  Mod I,  (S)    Letter Id (uppercase) F4: 9/10  Letter ID " (lowercase) F4: 5/10  Single digits F4: 8/10, 44/50- 88%     2024  3-4 letter words focused around body parts and letters.  Pt able to add single phoneme and name word with phonemic errors.      Body parts: f6: 90% accy.    2023  [employer] Language Kristel  Body parts F6: 9/10    2023  [employer] Language Kristel  Body parts F6: 9/10  10 place value numbers 7/, F10.      2023  [employer] Language Kristel  Body parts F4: 9/10    2023  Letter ID: F4 7/10    2023  Letter ID: F4 5/10     Education/Strategy Training 2023  Pt expectation was discussed this date that pt is expected to look at therapist mouth and produce vocal attempt.     Other 2023  Language kristel- reading phrase completion F4: 8/10    2023  F4 Matching 2 word phrases to single picture: 8/10  Phrase completion: F2- 9/10    2023  Pt reports losing weight because unable to cook and read cook books,  Not enjoying food people bring.    2023  Pt was able to share that Thelma's dog .

## 2024-01-26 NOTE — PROGRESS NOTES
Occupational Therapy Visit    OT DAILY NOTE FOR OUTPATIENT THERAPY    Patient: Jennifer Sams   MRN: [id number]  : 1945 79 y.o.  Referring Physician: Liv Haywood, Debra  Date of Visit: 2024      Certification Dates: 23 through 24    Diagnosis:   1. Acute ischemic left MCA stroke (CMS/HCC)    2. Lack of coordination        Chief Complaints:   Brain Injury    Precautions:       TODAY'S VISIT    Time In Session:  Start Time: 1705  Stop Time: 1800  Time Calculation (min): 55 min   History/Vitals/Pain/Encounter Info - 24        Injury History/Precautions/Daily Required Info    Document Type daily treatment     Primary Therapist Barry Rosas OTR/CARYN     Chief Complaint/Reason for Visit  Brain Injury     Onset of Illness/Injury or Date of Surgery 23     Referring Physician Dr. Haywood     History of present illness/functional impairment The patient is a 78-year-old  female with a history of AAA, heart block s/p cardiac pacemaker, glaucoma, hyperlipidemia, coronary artery disease, NSTEMI, who presented to [facility] on  after she has not been seen by her friends for a few days.  Patient lives alone and when she was found by EMS she was confused and combative.  In the emergency department she was aphasic with right-sided weakness and hypertensive to the 190 systolic.  CT scan of the head revealed an acute infarct in left MCA with mild bleeding.  Echo showed an EF of 43% and a bubble study was negative.  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  Later this was transitioned to apixaban.  She was continued on statin for secondary prevention.  She required virtual shviani while in the hospital but this was not effective.  She was evaluated by speech therapy and cleared for a soft and bite-size diet with moderately thick liquids.'     Patient/Family/Caregiver  Comments/Observations Patient arrives accompanied by caregiver Thelma; she reported that she did not initate wear of heated glove yet     Patient reported fall since last visit No        Pain Assessment    Currently in pain No/Denies         Services    Do You Speak a Language Other Than English at Home? no                Daily Treatment Assessment and Plan - 01/25/24 1913        Daily Treatment Assessment and Plan    Progress toward goals Progressing     Daily Outcome Summary Patient continued constraint induced treatment with functional tasks including sit to stand, pushing in chair and opening door today with right hand. She demonstrated adequate gross motor coordination on right hand to perform targetted ball rolling and able to progress to dribbling with consistency using right hand. Patient challenged by fine motor small peg removal today and appears to modify grasp with use of lateral pinch with benefit. She is progressing with use of RUE but continues to require verbal prompting to engage RUE in therapeutic tasks.     Plan and Recommendations task-oriented practice, fine and gross motor, functional tasks with right hand                     OBJECTIVE DATA TAKEN TODAY    None Taken    Today's Treatment:      OT NEURO FLOW SHEET    EXERCISES  Initial Evaluation  Progress Note 1  Progress Note 2  Re-evaluation CURRENT SESSION  8/16/2023  9/14/2023  10/23/23  11/14/23 TIME   NEURO RE-ED  CPT 24641 TOTAL TIME FOR SESSION 23-37 Minutes   AAROM/AROM Right hand opposition thumb to IF, 3 x 10 reps in elevation    Right hand grasp with large foam noodle, green therabar for improved pregrasp, grasp and hold    Strengthening      Strength       Gross Motor Control Gross motor coordination tasks with right hand for rolling therapy ball, catching beach ball against body, and volleying beach ball  - Patient able to progress to bouncing therapy ball on ground using right hand    Hand over hand coordinated  movement with foam noodle    Fine Motor Control Small peg removal from foam noodle, using right hand    Sitting Jennifer/Balance     Standing Jennifer/Balance     Sensory re-education     Graded motor imagery      Retraining     THERE ACT  CPT 54578'  TOTAL TIME FOR SESSION 0-7 Minutes   Pain, Vitals, Meds, Etc. pain assessed/monitored      Assessment     HEP  12/15/23: Added towel grasp and fold exercise, right hand only; patient expressed good understanding.    11/2/23: Review pinch strengthening positions today for improved carry over    10/26/23: Added soft theraputty for right hand pinch (lateral pinch, tip pinch, 3 jaw patti); patient expressed good understnading    Discussed carry over with use of SF splint    9/28/23: Laterality exercise added to HEP; patient and caregiver expressed good understanding    Right hand stretching    9/21/2023:   -Educated on use of rice/bean sensory bin, massage, sensory re-education with deep pressure and textures. Issued tubigrip for RUE sensory re-education and advised to wear 1x per day. Continued to discuss edema management.     Functional Mobility     Transfers     THERE EX  CPT 01990 TOTAL TIME FOR SESSION Not performed   PROM     Activity Tolerance     SELF-CARE  CPT 17125 TOTAL TIME FOR SESSION 8-22 Minutes   Pt Education/Safety Discussed safe and proper use of right hand glove for light heating and vibration. Patient able to yahaira independently and expressed good understanding of automatic shutoff for safe and proper use    ADL Training CI treatment focused on task-oriented activities including the following:  - Engaging right hand with sit to stand and stand to sit transfers  - Pushing in chair and pulling out chair with RUE  - Turning door handle and opening door, closing door with right hand    Patient put pillow case on pillow using BUE      IADL Training     MODALITIES  CPT 62689 TOTAL TIME FOR SESSION 0-7 Minutes   Ice/Heat Hot pack wrapped in towel applied to right hand  concurrent with review of functional tasks at home for increased use of right hand    ATTENDED E-STIM  CPT 39280 TOTAL TIME FOR SESSION Not performed   Attended E-Stim     SPLINTING  CPT 93432 TOTAL TIME FOR SESSION Not performed   Fabrication/Check Out     Fit     Training     GROUP  CPT 45646 TOTAL TIME FOR SESSION Not performed             Normative Range for Female 75+ Years of Age       Right Hand   Left Hand      Initial Evaluation Progress Note 1 Norm Initial Evaluation Progress Note 1 Norm    Strength 0 lbs 0 lbs 42.6 lbs 30 lbs 30 lbs 37.6 lbs   Lateral Pinch N/T 2 lbs 12.6 lbs N/T 14 lbs 11.4 lbs   Three Jaw Yonatan N/T 2 lbs (assist for position) 12 lbs N/T 11 lbs 11.5 lbs   Tip Pinch N/T N/T 9.6 lbs N/T 9 lbs 9.3 lbs   Box and Block 4 blocks 22 blocks 65 blocks 50 blocks 56 blocks 63.6 blocks   Nine Hole Peg (71+ age) Unable to complete Unable to complete 22.49 seconds N/T 24.55 seconds 24.11 seconds

## 2024-01-26 NOTE — OP OT TREATMENT LOG
OT NEURO FLOW SHEET    EXERCISES  Initial Evaluation  Progress Note 1  Progress Note 2  Re-evaluation CURRENT SESSION  8/16/2023  9/14/2023  10/23/23  11/14/23 TIME   NEURO RE-ED  CPT 18866 TOTAL TIME FOR SESSION 23-37 Minutes   AAROM/AROM Right hand opposition thumb to IF, 3 x 10 reps in elevation    Right hand grasp with large foam noodle, green therabar for improved pregrasp, grasp and hold    Strengthening      Strength       Gross Motor Control Gross motor coordination tasks with right hand for rolling therapy ball, catching beach ball against body, and volleying beach ball  - Patient able to progress to bouncing therapy ball on ground using right hand    Hand over hand coordinated movement with foam noodle    Fine Motor Control Small peg removal from foam noodle, using right hand    Sitting Jennifer/Balance     Standing Jennifer/Balance     Sensory re-education     Graded motor imagery      Retraining     THERE ACT  CPT 44725'  TOTAL TIME FOR SESSION 0-7 Minutes   Pain, Vitals, Meds, Etc. pain assessed/monitored      Assessment     HEP  12/15/23: Added towel grasp and fold exercise, right hand only; patient expressed good understanding.    11/2/23: Review pinch strengthening positions today for improved carry over    10/26/23: Added soft theraputty for right hand pinch (lateral pinch, tip pinch, 3 jaw patti); patient expressed good understnading    Discussed carry over with use of SF splint    9/28/23: Laterality exercise added to HEP; patient and caregiver expressed good understanding    Right hand stretching    9/21/2023:   -Educated on use of rice/bean sensory bin, massage, sensory re-education with deep pressure and textures. Issued tubigrip for RUE sensory re-education and advised to wear 1x per day. Continued to discuss edema management.     Functional Mobility     Transfers     THERE EX  CPT 07653 TOTAL TIME FOR SESSION Not performed   PROM     Activity Tolerance     SELF-CARE  CPT 03586 TOTAL TIME FOR  SESSION 8-22 Minutes   Pt Education/Safety Discussed safe and proper use of right hand glove for light heating and vibration. Patient able to yahaira independently and expressed good understanding of automatic shutoff for safe and proper use    ADL Training CI treatment focused on task-oriented activities including the following:  - Engaging right hand with sit to stand and stand to sit transfers  - Pushing in chair and pulling out chair with RUE  - Turning door handle and opening door, closing door with right hand    Patient put pillow case on pillow using BUE      IADL Training     MODALITIES  CPT 46497 TOTAL TIME FOR SESSION 0-7 Minutes   Ice/Heat Hot pack wrapped in towel applied to right hand concurrent with review of functional tasks at home for increased use of right hand    ATTENDED E-STIM  CPT 38852 TOTAL TIME FOR SESSION Not performed   Attended E-Stim     SPLINTING  CPT 97659 TOTAL TIME FOR SESSION Not performed   Fabrication/Check Out     Fit     Training     GROUP  CPT 14106 TOTAL TIME FOR SESSION Not performed             Normative Range for Female 75+ Years of Age       Right Hand   Left Hand      Initial Evaluation Progress Note 1 Norm Initial Evaluation Progress Note 1 Norm    Strength 0 lbs 0 lbs 42.6 lbs 30 lbs 30 lbs 37.6 lbs   Lateral Pinch N/T 2 lbs 12.6 lbs N/T 14 lbs 11.4 lbs   Three Jaw Yonatan N/T 2 lbs (assist for position) 12 lbs N/T 11 lbs 11.5 lbs   Tip Pinch N/T N/T 9.6 lbs N/T 9 lbs 9.3 lbs   Box and Block 4 blocks 22 blocks 65 blocks 50 blocks 56 blocks 63.6 blocks   Nine Hole Peg (71+ age) Unable to complete Unable to complete 22.49 seconds N/T 24.55 seconds 24.11 seconds

## 2024-02-01 ENCOUNTER — HOSPITAL ENCOUNTER (OUTPATIENT)
Dept: SPEECH THERAPY | Age: 79
Setting detail: THERAPIES SERIES
Discharge: HOME | End: 2024-02-01
Attending: PHYSICAL MEDICINE & REHABILITATION
Payer: MEDICARE

## 2024-02-01 ENCOUNTER — HOSPITAL ENCOUNTER (OUTPATIENT)
Dept: OCCUPATIONAL THERAPY | Age: 79
Setting detail: THERAPIES SERIES
Discharge: HOME | End: 2024-02-01
Attending: PHYSICAL MEDICINE & REHABILITATION
Payer: MEDICARE

## 2024-02-01 DIAGNOSIS — I63.512 ACUTE ISCHEMIC LEFT MCA STROKE (CMS/HCC): ICD-10-CM

## 2024-02-01 DIAGNOSIS — I63.512 ACUTE ISCHEMIC LEFT MCA STROKE (CMS/HCC): Primary | ICD-10-CM

## 2024-02-01 DIAGNOSIS — R27.9 LACK OF COORDINATION: ICD-10-CM

## 2024-02-01 DIAGNOSIS — I63.9 CEREBROVASCULAR ACCIDENT (CVA), UNSPECIFIED MECHANISM (CMS/HCC): Primary | ICD-10-CM

## 2024-02-01 PROCEDURE — 97535 SELF CARE MNGMENT TRAINING: CPT | Mod: GO

## 2024-02-01 PROCEDURE — 97010 HOT OR COLD PACKS THERAPY: CPT | Mod: GO

## 2024-02-01 PROCEDURE — 92507 TX SP LANG VOICE COMM INDIV: CPT | Mod: GN

## 2024-02-01 PROCEDURE — 97112 NEUROMUSCULAR REEDUCATION: CPT | Mod: GO,59

## 2024-02-01 NOTE — OP SLP TREATMENT LOG
"LANGUAGE SLP FLOW SHEET    SKILL AREA CURRENT SESSION   SPEECH THERAPY: LANGUAGE  CPT 97054    MOTOR SPEECH:  Provided Auditory bombardment, picture stimulus, direct visual/aud model, pt will produce target words with /p,b,m,w,t,d,n,s,l/ in initial position on first trial, 80% of presentations.  2/1/2024  Naming ted: 3/6 provided phonemic cues.   Sentence completion with opposits: [facility] 1 P61-9/10     1/23/2024  Bus, bird, bike, bear,bath, boat,   Pencil, pig, meagan, pickles, party, pony, poodle, pie, pants, parrot, peach, parrot, pizza,     Unable to produce /t, d/ in isolation.       1/18/2024 (I) naming provided pictures and words.   /p /80%, provided a visual picture stimulus  /b/ 70% provided a visual picture stimulus    /t/ isolation- unable   /t/ CV: direct model, 50% of presentation limited to CV, sent home practice words.     1/11/2024  Direct model: with [id number].   /p/  6/9, 7/9  /b/ 6/9      Fading cues from above goal: Pt will produce CVCV, VCV and CVC syllables 80% of presentations.  1/11/2023  /b/ - CVC, CVCV- direct model       SPOKEN LANG EXP  Given simple picture cue pt will produce simple sentences of 3-4 words with simplified grammatical form and approximated content words to convey meaning, Min A, 80% of presentations.    1/18/2024  Verb ted: \"test\" direct model required, articulatory imprecision noted.    Aud comp F4 verb identification: 10/10  Reading comp: 6/10 (I),therapist read words 8/10.     1/4/2024  Advanced Language (comp) build  Difficulty with L5-6, +1 extra word- 40%  L5: Nouns, non-reversible: 2/5, 40%  L6: 1 pronoun, 1 nouns, non reversible : 2/5, 40%-Additional 3 with hint    12/28/2023  2-5 words unscramble to photograph.  90%:  1 noun, simple sentence 2/2  1 pronoun, simple sentence 2/2  1 noun present progressive 1/2  1 pronoun, present progressive 2/2  2 nouns, non-reversible 2/2 12/7/2023  Pt visually unscrambled 3 words to describe a picture on Advanced " "Comprehension kristel build- Noun- Simple Sentences.   Pt will produce simple automatics provided mod A:  1-20  A-J  MALLY 2024  Individual  ABC-A-B-C  T-W-H-F  1-10  Apraxia Kristel:  1-10 with practice to use the visual cues.   Steen-Sa: 2024-continue  MALLY, LEONEL with apps - pt improved ability when looking at the kristel or at clinician.     2024  MALLY with slow direct model 2024  1- Mod I  ABC: A-C (I)-A-J, 70% direct model  MALLY:  with direct model.     2023  Unison: 1-10,   Fading unison: -10  Unison: -   Unison: A-J, cues to look at therapist's lips   MALLY: one at a time with direct model    2023  Apraxia Kristel  1-10:8/10  MALLY:  , improved with therapist isolating and repeating   A-J: 7/10 Mod to max A.    2023  Apraxia Kristel:  1-10: Improving  MALLY: improving  A-Z: improving with use of words at end of ApraxiaApp       Apraxia kristel  1-10:  1-5  MALLY: very difficult      Pt will produce simple social responses, Mod A:  Name     Address (parts) 2024- continue with address  Name: Mod I- took extra time due to being upset  : Month- Mod A, date mod A    2024  Name: Mod I  : - (S)  Address: \"[facility]\"        SPOKEN LANG COMP  The pt will demonstrate understanding of 2 noun in simple and present and progressive reversible sentences F4, 80% accy.   (Advanced language comprehension L1-2 Identify)  Up to 7-2024  L8:  F4: 3 nouns reversible, 77%  L9: 3 nouns, reversible, indirect object, 75%  L10: 3 nouns non-reversible , passive: 8/2024- Met, increasing stimulus  L8:  F4: 3 nouns reversible, 90%  L9: 3 nouns, reversible, indirect object, 80%  L10: 3 nouns non-reversible , passive:     Increase to paragraphs.     2024  L7: F4- 3 nouns non reversible 100%  L8:  F4: 3 nouns reversible,60%, 90%    2023  L6-7, F4 9/10  2 nouns, non-reversible passive 5/5  3 nouns non-reversible 4/5  L7-8, F4  3 nouns non-reversible " "5/5  3 nouns reversible 5/5      Provided body parts (on paper), single letters, digits in 10's/100's place value, pt will demonstrate  understanding of \"point to/touch/pick up___\" commands 85% of trials.    Number therapy kristel  Number flash cards.     2024  Letter Id (uppercase) F10:  10/16    2024- progressing  Body parts 4/5 Mod I, 5/5 (S)     Letter Id (uppercase) F4: 9/10  Letter ID (lowercase) F4: 510  Single digits F4: 8/10, 44/50- 88%     2024  3-4 letter words focused around body parts and letters.  Pt able to add single phoneme and name word with phonemic errors.      Body parts: f6: 90% accy.    2023  Tactus Language Kristel  Body parts F6: 9/10    2023  Tactus Language Kristel  Body parts F6: 9/10  10 place value numbers 7/, F10.      2023  Tactus Language Kristel  Body parts F4: 9/10    2023  Letter ID: F4 7/10    2023  Letter ID: F4 5/10     Education/Strategy Training 2023  Pt expectation was discussed this date that pt is expected to look at therapist mouth and produce vocal attempt.     Other 2024  Attempt at Virtualis with pt and pt become upset.      2023  Language kristel- reading phrase completion F4: 8/10    2023  F4 Matching 2 word phrases to single picture: 8/10  Phrase completion: F2- 9/10    2023  Pt reports losing weight because unable to cook and read cook books,  Not enjoying food people bring.    2023  Pt was able to share that Thelma's dog .         "

## 2024-02-01 NOTE — PROGRESS NOTES
Speech-Language Pathology Visit      SLP DAILY NOTE FOR OUTPATIENT THERAPY    Patient: Jennifer Sams   MRN: [id number]  : 1945 79 y.o.  Referring Physician: Liv Haywood, Debra  Date of Visit: 2024      Certification Dates: 23 through 24    Diagnosis:   1. Cerebrovascular accident (CVA), unspecified mechanism (CMS/HCC)    2. Acute ischemic left MCA stroke (CMS/HCC)        Chief Complaints:  apraxia, aphasia    Precautions:  Precautions Comments: aphasia, apraxia, pacemaker       TODAY'S VISIT:    Session Time:  Start Time: 1605  Stop Time: 1700  Time Calculation (min): 55 min   History/Vitals/Pain/Encounter Info - 24 1609        Injury History/Precautions/Daily Required Info    Primary Therapist Magnolia Lundy MS,CCC/SLP     Chief Complaint/Reason for Visit  apraxia, aphasia     Onset of Illness/Injury or Date of Surgery 23     Referring Physician Dr. Wang     Precautions Comments aphasia, apraxia, pacemaker     Limitations/Impairments safety/cognitive     Document Type daily treatment     Patient/Family/Caregiver Comments/Observations Pt reports frustration with aphasia, but states wants to continue.     Patient reported fall since last visit No        Pain Assessment    Currently in pain No/Denies                Daily Treatment Assessment and Plan - 24 1609        Daily Treatment Assessment and Plan    Progress toward goals Progressing     Daily Outcome Summary Pt continues to have difficulty attending to visual/oral cues to shape words.  Pt's repetition improves when provided initial phoneme cue, however, attention and impulsivity inhibits trials.     Plan and Recommendations Continue with naming- naming goal to be derived, bilabial words.                  OBJECTIVE MEASUREMENTS TAKEN TODAY:    None Taken    Today's Treatment:    LANGUAGE SLP FLOW SHEET    SKILL AREA CURRENT SESSION   SPEECH THERAPY: LANGUAGE  CPT 72651    MOTOR SPEECH:  Provided Auditory  "bombardment, picture stimulus, direct visual/aud model, pt will produce target words with /p,b,m,w,t,d,n,s,l/ in initial position on first trial, 80% of presentations.  2/1/2024  Naming ted: 3/6 provided phonemic cues.   Sentence completion with opposits: [facility] 1 P61-9/10     1/23/2024  Bus, bird, bike, bear,bath, boat,   Pencil, pig, meagan, pickles, party, pony, poodle, pie, pants, parrot, peach, parrot, pizza,     Unable to produce /t, d/ in isolation.       1/18/2024 (I) naming provided pictures and words.   /p /80%, provided a visual picture stimulus  /b/ 70% provided a visual picture stimulus    /t/ isolation- unable   /t/ CV: direct model, 50% of presentation limited to CV, sent home practice words.     1/11/2024  Direct model: with [id number].   /p/  6/9, 7/9  /b/ 6/9      Fading cues from above goal: Pt will produce CVCV, VCV and CVC syllables 80% of presentations.  1/11/2023  /b/ - CVC, CVCV- direct model       SPOKEN LANG EXP  Given simple picture cue pt will produce simple sentences of 3-4 words with simplified grammatical form and approximated content words to convey meaning, Min A, 80% of presentations.    1/18/2024  Verb ted: \"test\" direct model required, articulatory imprecision noted.    Aud comp F4 verb identification: 10/10  Reading comp: 6/10 (I),therapist read words 8/10.     1/4/2024  Advanced Language (comp) build  Difficulty with L5-6, +1 extra word- 40%  L5: Nouns, non-reversible: 2/5, 40%  L6: 1 pronoun, 1 nouns, non reversible : 2/5, 40%-Additional 3 with hint    12/28/2023  2-5 words unscramble to photograph.  90%:  1 noun, simple sentence 2/2  1 pronoun, simple sentence 2/2  1 noun present progressive 1/2  1 pronoun, present progressive 2/2  2 nouns, non-reversible 2/2    12/7/2023  Pt visually unscrambled 3 words to describe a picture on Advanced Comprehension ted build- Noun- Simple Sentences.   Pt will produce simple automatics provided mod A:  1-20  ANILDA BAL " "2024  Individual  ABC-A-B-C  T-W-H-F  1-10  Apraxia Kristel:  1-10 with practice to use the visual cues.   Steen-Sa: 2024-continue  MALLY, LEONEL with apps - pt improved ability when looking at the kristel or at clinician.     2024  MALLY with slow direct model 2024  1-12 Mod I  ABC: A-C (I)-A-J, 70% direct model  MALLY:  with direct model.     2023  Unison: 1-10,   Fading unison: 1-10  Unison: -   Unison: A-J, cues to look at therapist's lips   MALLY: one at a time with direct model    2023  Apraxia Kristel  1-10:8/10  MALLY:  , improved with therapist isolating and repeating   A-J: 7/10 Mod to max A.    2023  Apraxia Kristel:  1-10: Improving  MALLY: improving  A-Z: improving with use of words at end of ApraxiaApp       Apraxia kristel  1-10:  1-5  MALLY: very difficult      Pt will produce simple social responses, Mod A:  Name     Address (parts) 2024- continue with address  Name: Mod I- took extra time due to being upset  : Month- Mod A, date mod A    2024  Name: Mod I  : - (S)  Address: \"[facility]\"        SPOKEN LANG COMP  The pt will demonstrate understanding of 2 noun in simple and present and progressive reversible sentences F4, 80% accy.   (Advanced language comprehension L1-2 Identify)  Up to 7-8 2024  L8:  F4: 3 nouns reversible, 77%  L9: 3 nouns, reversible, indirect object, 75%  L10: 3 nouns non-reversible , passive: 2024- Met, increasing stimulus  L8:  F4: 3 nouns reversible, 90%  L9: 3 nouns, reversible, indirect object, 80%  L10: 3 nouns non-reversible , passive:     Increase to paragraphs.     2024  L7: F4- 3 nouns non reversible 100%  L8:  F4: 3 nouns reversible,60%, 90%    2023  L6-7, F4 9/10  2 nouns, non-reversible passive 5/5  3 nouns non-reversible 4/5  L7-8, F4  3 nouns non-reversible 5/5  3 nouns reversible 5/5      Provided body parts (on paper), single letters, digits in 10's/100's place value, pt " "will demonstrate  understanding of \"point to/touch/pick up___\" commands 85% of trials.    Number therapy kristel  Number flash cards.     2024  Letter Id (uppercase) F10:  10/16    2024- progressing  Body parts 4/5 Mod I, 5/5 (S)     Letter Id (uppercase) F4: 9/10  Letter ID (lowercase) F4: 5/10  Single digits F4: 8/10, 44/50- 88%     2024  3-4 letter words focused around body parts and letters.  Pt able to add single phoneme and name word with phonemic errors.      Body parts: f6: 90% accy.    2023  Tactus Language Kristel  Body parts F6: 9/10    2023  Tactus Language Kristel  Body parts F6: 9/10  10 place value numbers 7/7, F10.      2023  Tactus Language Kristel  Body parts F4: 9/10    2023  Letter ID: F4 7/10    2023  Letter ID: F4 5/10     Education/Strategy Training 2023  Pt expectation was discussed this date that pt is expected to look at therapist mouth and produce vocal attempt.     Other 2024  Attempt at Virtualis with pt and pt become upset.      2023  Language kristel- reading phrase completion F4: 8/10    2023  F4 Matching 2 word phrases to single picture: 8/10  Phrase completion: F2- 9/10    2023  Pt reports losing weight because unable to cook and read cook books,  Not enjoying food people bring.    2023  Pt was able to share that Thelma's dog .                                   "

## 2024-02-02 NOTE — OP OT TREATMENT LOG
OT NEURO FLOW SHEET    EXERCISES  Initial Evaluation  Progress Note 1  Progress Note 2  Re-evaluation CURRENT SESSION  8/16/2023  9/14/2023  10/23/23  11/14/23 TIME   NEURO RE-ED  CPT 88318 TOTAL TIME FOR SESSION 23-37 Minutes   AAROM/AROM Right hand grasp with green therabar for cylindrical grasp    Strengthening      Strength   Lavendar putty  squeezes, 3 x 5  Flat hand rolls with lavendar putty x 3 reps    Gross Motor Control     Fine Motor Control     Sitting Jennifer/Balance     Standing Jennifer/Balance     Sensory re-education Steregonosis with tin container and green flexbar for sensory re-ed    Graded motor imagery      Retraining     THERE ACT  CPT 16839'  TOTAL TIME FOR SESSION 0-7 Minutes   Pain, Vitals, Meds, Etc. pain assessed/monitored      Assessment     HEP  12/15/23: Added towel grasp and fold exercise, right hand only; patient expressed good understanding.    11/2/23: Review pinch strengthening positions today for improved carry over    10/26/23: Added soft theraputty for right hand pinch (lateral pinch, tip pinch, 3 jaw patti); patient expressed good understnading    Discussed carry over with use of SF splint    9/28/23: Laterality exercise added to HEP; patient and caregiver expressed good understanding    Right hand stretching    9/21/2023:   -Educated on use of rice/bean sensory bin, massage, sensory re-education with deep pressure and textures. Issued tubigrip for RUE sensory re-education and advised to wear 1x per day. Continued to discuss edema management.     Functional Mobility     Transfers     THERE EX  CPT 78623 TOTAL TIME FOR SESSION Not performed   PROM     Activity Tolerance     SELF-CARE  CPT 28044 TOTAL TIME FOR SESSION 23-37 Minutes   Pt Education/Safety Discussed safe and proper use of right hand glove for light heating and vibration. Patient able to yahaira independently and expressed good understanding of automatic shutoff for safe and proper use    ADL Training Functional  grasp with everyday items including the following:  - built up fork, spoon  - Large tongs  - Remote control    CI treatment focused on task-oriented activities including the following:  - folding towel  - placing pens into container  - placing game cards into container    IADL Training     MODALITIES  CPT 54483 TOTAL TIME FOR SESSION 8-22 Minutes   Ice/Heat Hot pack wrapped in towel applied to right hand concurrent with discussion of safety of vibration glove    ATTENDED E-STIM  CPT 45276 TOTAL TIME FOR SESSION Not performed   Attended E-Stim     SPLINTING  CPT 54474 TOTAL TIME FOR SESSION Not performed   Fabrication/Check Out     Fit     Training     GROUP  CPT 38125 TOTAL TIME FOR SESSION Not performed             Normative Range for Female 75+ Years of Age       Right Hand   Left Hand      Initial Evaluation Progress Note 1 Norm Initial Evaluation Progress Note 1 Norm    Strength 0 lbs 0 lbs 42.6 lbs 30 lbs 30 lbs 37.6 lbs   Lateral Pinch N/T 2 lbs 12.6 lbs N/T 14 lbs 11.4 lbs   Three Jaw Yonatan N/T 2 lbs (assist for position) 12 lbs N/T 11 lbs 11.5 lbs   Tip Pinch N/T N/T 9.6 lbs N/T 9 lbs 9.3 lbs   Box and Block 4 blocks 22 blocks 65 blocks 50 blocks 56 blocks 63.6 blocks   Nine Hole Peg (71+ age) Unable to complete Unable to complete 22.49 seconds N/T 24.55 seconds 24.11 seconds

## 2024-02-02 NOTE — PROGRESS NOTES
Occupational Therapy Visit    OT DAILY NOTE FOR OUTPATIENT THERAPY    Patient: Jennifer Sams   MRN: [id number]  : 1945 79 y.o.  Referring Physician: Liv Haywood, Debra  Date of Visit: 2024      Certification Dates: 23 through 24    Diagnosis:   1. Acute ischemic left MCA stroke (CMS/HCC)    2. Lack of coordination        Chief Complaints:   Brain Injury    Precautions:       TODAY'S VISIT    Time In Session:  Start Time: 1705  Stop Time: 1800  Time Calculation (min): 55 min   History/Vitals/Pain/Encounter Info - 24 1906        Injury History/Precautions/Daily Required Info    Document Type daily treatment     Primary Therapist Barry Rosas OTR/CARYN     Chief Complaint/Reason for Visit  Brain Injury     Onset of Illness/Injury or Date of Surgery 23     Referring Physician Dr. Haywood     History of present illness/functional impairment The patient is a 78-year-old  female with a history of AAA, heart block s/p cardiac pacemaker, glaucoma, hyperlipidemia, coronary artery disease, NSTEMI, who presented to [facility] on  after she has not been seen by her friends for a few days.  Patient lives alone and when she was found by EMS she was confused and combative.  In the emergency department she was aphasic with right-sided weakness and hypertensive to the 190 systolic.  CT scan of the head revealed an acute infarct in left MCA with mild bleeding.  Echo showed an EF of 43% and a bubble study was negative.  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  Later this was transitioned to apixaban.  She was continued on statin for secondary prevention.  She required virtual shivani while in the hospital but this was not effective.  She was evaluated by speech therapy and cleared for a soft and bite-size diet with moderately thick liquids.'     Patient/Family/Caregiver  Comments/Observations Patient arrived today without complaint     Patient reported fall since last visit No        Pain Assessment    Currently in pain No/Denies         Services    Do You Speak a Language Other Than English at Home? no                Daily Treatment Assessment and Plan - 02/01/24 1906        Daily Treatment Assessment and Plan    Progress toward goals Progressing     Daily Outcome Summary Patient expresses concern regarding use of vibration glove with pacemaker implant and therapist recommended to discontinue use at this time. Patient demonstrates improved functional grasp and fine motor control at right hand with functional grasping during CI task-oriented activities today. Patient able to complete  strengthening exercises with lavendar theraputty and with good tolerance.     Plan and Recommendations task-oriented practice, fine and gross motor, functional tasks with right hand                     OBJECTIVE DATA TAKEN TODAY    None Taken    Today's Treatment:      OT NEURO FLOW SHEET    EXERCISES  Initial Evaluation  Progress Note 1  Progress Note 2  Re-evaluation CURRENT SESSION  8/16/2023  9/14/2023  10/23/23  11/14/23 TIME   NEURO RE-ED  CPT 17084 TOTAL TIME FOR SESSION 23-37 Minutes   AAROM/AROM Right hand grasp with green therabar for cylindrical grasp    Strengthening      Strength   Lavendar putty  squeezes, 3 x 5  Flat hand rolls with lavendar putty x 3 reps    Gross Motor Control     Fine Motor Control     Sitting Jennifer/Balance     Standing Jennifer/Balance     Sensory re-education Steregonosis with tin container and green flexbar for sensory re-ed    Graded motor imagery      Retraining     THERE ACT  CPT 50155'  TOTAL TIME FOR SESSION 0-7 Minutes   Pain, Vitals, Meds, Etc. pain assessed/monitored      Assessment     HEP  12/15/23: Added towel grasp and fold exercise, right hand only; patient expressed good understanding.    11/2/23: Review pinch strengthening positions  today for improved carry over    10/26/23: Added soft theraputty for right hand pinch (lateral pinch, tip pinch, 3 jaw patti); patient expressed good understnading    Discussed carry over with use of SF splint    9/28/23: Laterality exercise added to HEP; patient and caregiver expressed good understanding    Right hand stretching    9/21/2023:   -Educated on use of rice/bean sensory bin, massage, sensory re-education with deep pressure and textures. Issued tubigrip for RUE sensory re-education and advised to wear 1x per day. Continued to discuss edema management.     Functional Mobility     Transfers     THERE EX  CPT 31018 TOTAL TIME FOR SESSION Not performed   PROM     Activity Tolerance     SELF-CARE  CPT 98951 TOTAL TIME FOR SESSION 23-37 Minutes   Pt Education/Safety Discussed safe and proper use of right hand glove for light heating and vibration. Patient able to yahaira independently and expressed good understanding of automatic shutoff for safe and proper use    ADL Training Functional grasp with everyday items including the following:  - built up fork, spoon  - Large tongs  - Remote control    CI treatment focused on task-oriented activities including the following:  - folding towel  - placing pens into container  - placing game cards into container    IADL Training     MODALITIES  CPT 94239 TOTAL TIME FOR SESSION 8-22 Minutes   Ice/Heat Hot pack wrapped in towel applied to right hand concurrent with discussion of safety of vibration glove    ATTENDED E-STIM  CPT 67665 TOTAL TIME FOR SESSION Not performed   Attended E-Stim     SPLINTING  CPT 67862 TOTAL TIME FOR SESSION Not performed   Fabrication/Check Out     Fit     Training     GROUP  CPT 23541 TOTAL TIME FOR SESSION Not performed             Normative Range for Female 75+ Years of Age       Right Hand   Left Hand      Initial Evaluation Progress Note 1 Norm Initial Evaluation Progress Note 1 Norm    Strength 0 lbs 0 lbs 42.6 lbs 30 lbs 30 lbs 37.6 lbs    Lateral Pinch N/T 2 lbs 12.6 lbs N/T 14 lbs 11.4 lbs   Three Jaw Yonatan N/T 2 lbs (assist for position) 12 lbs N/T 11 lbs 11.5 lbs   Tip Pinch N/T N/T 9.6 lbs N/T 9 lbs 9.3 lbs   Box and Block 4 blocks 22 blocks 65 blocks 50 blocks 56 blocks 63.6 blocks   Nine Hole Peg (71+ age) Unable to complete Unable to complete 22.49 seconds N/T 24.55 seconds 24.11 seconds

## 2024-02-06 ENCOUNTER — HOSPITAL ENCOUNTER (OUTPATIENT)
Dept: OCCUPATIONAL THERAPY | Age: 79
Setting detail: THERAPIES SERIES
Discharge: HOME | End: 2024-02-06
Attending: PHYSICAL MEDICINE & REHABILITATION
Payer: MEDICARE

## 2024-02-06 ENCOUNTER — HOSPITAL ENCOUNTER (OUTPATIENT)
Dept: SPEECH THERAPY | Age: 79
Setting detail: THERAPIES SERIES
Discharge: HOME | End: 2024-02-06
Attending: PHYSICAL MEDICINE & REHABILITATION
Payer: MEDICARE

## 2024-02-06 DIAGNOSIS — I63.9 CEREBROVASCULAR ACCIDENT (CVA), UNSPECIFIED MECHANISM (CMS/HCC): Primary | ICD-10-CM

## 2024-02-06 DIAGNOSIS — R27.9 LACK OF COORDINATION: ICD-10-CM

## 2024-02-06 DIAGNOSIS — I63.512 ACUTE ISCHEMIC LEFT MCA STROKE (CMS/HCC): Primary | ICD-10-CM

## 2024-02-06 PROCEDURE — 92507 TX SP LANG VOICE COMM INDIV: CPT | Mod: GN

## 2024-02-06 PROCEDURE — 97530 THERAPEUTIC ACTIVITIES: CPT | Mod: GO,59

## 2024-02-06 PROCEDURE — 97112 NEUROMUSCULAR REEDUCATION: CPT | Mod: GO,59

## 2024-02-06 ASSESSMENT — 9 HOLE PEG TEST: TEST_RESULT: 180

## 2024-02-06 NOTE — PROGRESS NOTES
Speech-Language Pathology Visit      SLP DAILY NOTE FOR OUTPATIENT THERAPY    Patient: Jennifer Sams   MRN: [id number]  : 1945 79 y.o.  Referring Physician: Liv Haywood, Debra  Date of Visit: 2024      Certification Dates: 23 through 24    Diagnosis:   1. Cerebrovascular accident (CVA), unspecified mechanism (CMS/HCC)        Chief Complaints:  apraxia, aphasia    Precautions:  Precautions Comments: aphasia, apraxia, pacemaker       TODAY'S VISIT:    Session Time:  Start Time: 1700  Stop Time: 1800  Time Calculation (min): 60 min   History/Vitals/Pain/Encounter Info - 24        Injury History/Precautions/Daily Required Info    Primary Therapist Magnolia Lundy MS,CCC/SLP     Chief Complaint/Reason for Visit  apraxia, aphasia     Onset of Illness/Injury or Date of Surgery 23     Referring Physician Dr. Kathleen Agudelo Comments aphasia, apraxia, pacemaker     Limitations/Impairments safety/cognitive     Document Type daily treatment     Patient/Family/Caregiver Comments/Observations Thyroid test- will meet with doctor on Thursday.     Patient reported fall since last visit No        Pain Assessment    Currently in pain No/Denies        Pain Interventions    Intervention none     Post Intervention Comments none                Daily Treatment Assessment and Plan - 24        Daily Treatment Assessment and Plan    Progress toward goals Slower than expected     Daily Outcome Summary Continues to improved in auditory comprehensin of complex sentences with 3 nouns, passive.     Plan and Recommendations RE-evaluation.  Pt reported interest in continuing                  OBJECTIVE MEASUREMENTS TAKEN TODAY:    None Taken    Today's Treatment:    LANGUAGE SLP FLOW SHEET    SKILL AREA CURRENT SESSION   SPEECH THERAPY: LANGUAGE  [facility] [id number]    MOTOR SPEECH:  Provided Auditory bombardment, picture stimulus, direct visual/aud model, pt will produce target words with  "/p,b,m,w,t,d,n,s,l/ in initial position on first trial, 80% of presentations.  2/6/2024  NAMING: benefits from seeing the first sound and reading cues.  Improved consistency in naming pictures and reading cues with p, b,m, l, n.      2/1/2024  Naming ted: 3/6 provided phonemic cues.   Sentence completion with opposits: [facility] 1 P61-9/[id number]     1/23/2024  Bus, bird, bike, bear,bath, boat,   Pencil, pig, meagan, pickles, party, pony, poodle, pie, pants, parrot, peach, parrot, pizza,     Unable to produce /t, d/ in isolation.       1/18/2024 (I) naming provided pictures and words.   /p /80%, provided a visual picture stimulus  /b/ 70% provided a visual picture stimulus    /t/ isolation- unable   /t/ CV: direct model, 50% of presentation limited to CV, sent home practice words.     1/11/2024  Direct model: with [id number].   /p/  6/9, 7/9  /b/ 6/9      Fading cues from above goal: Pt will produce CVCV, VCV and CVC syllables 80% of presentations.  2/6/2024  Improving /m, b, p/ in 1-2 syllable word pictures with cues to target phoneme.     1/11/2023  /b/ - CVC, CVCV- direct model       SPOKEN LANG EXP  Given simple picture cue pt will produce simple sentences of 3-4 words with simplified grammatical form and approximated content words to convey meaning, Min A, 80% of presentations.    1/18/2024  Verb ted: \"test\" direct model required, articulatory imprecision noted.    Aud comp F4 verb identification: 10/10  Reading comp: 6/10 (I),therapist read words 8/10.     1/4/2024  Advanced Language (comp) build  Difficulty with L5-6, +1 extra word- 40%  L5: Nouns, non-reversible: 2/5, 40%  L6: 1 pronoun, 1 nouns, non reversible : 2/5, 40%-Additional 3 with hint    12/28/2023  2-5 words unscramble to photograph.  90%:  1 noun, simple sentence 2/2  1 pronoun, simple sentence 2/2  1 noun present progressive 1/2  1 pronoun, present progressive 2/2  2 nouns, non-reversible 2/2 12/7/2023  Pt visually unscrambled 3 words to " "describe a picture on Advanced Comprehension kristel build- Noun- Simple Sentences.   Pt will produce simple automatics provided mod A:  1-20  A-J  MALLY 2024  Individual  ABC-A-B-C  T-W-H-F  1-10  Apraxia Kristel:  1-10 with practice to use the visual cues.   Steen-Sa: 2024-continue  MALLY, LEONEL with apps - pt improved ability when looking at the kristel or at clinician.     2024  MALLY with slow direct model 2024  1- Mod I  ABC: A-C (I)-A-J, 70% direct model  MALLY:  with direct model.     2023  Unison: 1-10,   Fading unison: 1-10  Unison: -   Unison: A-J, cues to look at therapist's lips   MALLY: one at a time with direct model    2023  Apraxia Kristel  1-10:8/10  MALLY:  , improved with therapist isolating and repeating   A-J: 7/10 Mod to max A.    2023  Apraxia Kristel:  1-10: Improving  MALLY: improving  A-Z: improving with use of words at end of ApraxiaApp       Apraxia kristel  1-10:  1-5  MALLY: very difficult      Pt will produce simple social responses, Mod A:  Name     Address (parts) 2024- continue with address  Name: Mod I- took extra time due to being upset  : Month- Mod A, date mod A    2024  Name: Mod I  : - (S)  Address: \"[facility]\"        SPOKEN LANG COMP  The pt will demonstrate understanding of 2 noun in simple and present and progressive reversible sentences F4, 80% accy.   (Advanced language comprehension L1-2 Identify)  Up to 7-2024  L8:  F4: 3 nouns reversible, 85%  L9: 3 nouns, reversible, indirect object, 100%  L10: 3 nouns non-reversible , passive: 100%  L11: 3 nouns, Reversible, Passive:  66%    2024  L8:  F4: 3 nouns reversible, 77%  L9: 3 nouns, reversible, indirect object, 75%  L10: 3 nouns non-reversible , passive: 2024- Met, increasing stimulus  L8:  F4: 3 nouns reversible, 90%  L9: 3 nouns, reversible, indirect object, 80%  L10: 3 nouns non-reversible , passive:     Increase to paragraphs. " "    2024  L7: F4- 3 nouns non reversible 100%  L8:  F4: 3 nouns reversible,60%, 90%    2023  L6-7, F4 9/10  2 nouns, non-reversible passive 5/5  3 nouns non-reversible 4/5  L7-8, F4  3 nouns non-reversible 5/5  3 nouns reversible 5/5      Provided body parts (on paper), single letters, digits in 10's/100's place value, pt will demonstrate  understanding of \"point to/touch/pick up___\" commands 85% of trials.    Number therapy kristel  Number flash cards.     2024  10's place value: : 70%    2024  Letter Id (uppercase) F10:  10/16    2024- progressing  Body parts 4/5 Mod I, 5/5 (S)     Letter Id (uppercase) F4: 9/10  Letter ID (lowercase) F4: [id number]  Single digits F4: 8/10, 44/50- 88%     2024  3-4 letter words focused around body parts and letters.  Pt able to add single phoneme and name word with phonemic errors.      Body parts: f6: 90% accy.    2023  Tactus Language Kristel  Body parts F6: 9/10    2023  Tactus Language Kristel  Body parts F6: 9/10  10 place value numbers 7/7, F10.      2023  Tactus Language Kristel  Body parts F4: 9/10    2023  Letter ID: F4 7/10    2023  Letter ID: F4 5/10     Education/Strategy Training 2023  Pt expectation was discussed this date that pt is expected to look at therapist mouth and produce vocal attempt.     Other 2024  Attempt at Virtualis with pt and pt become upset.      2023  Language kristel- reading phrase completion F4: 8/10    2023  F4 Matching 2 word phrases to single picture: 8/10  Phrase completion: F2- 9/10    2023  Pt reports losing weight because unable to cook and read cook books,  Not enjoying food people bring.    2023  Pt was able to share that Thelma's dog .                                   "

## 2024-02-06 NOTE — Clinical Note
Dear DR. Haywood,    Thank you for this referral. Please review the attached notes and plan of care for your approval.  Please contact our department with any questions.     Sincerely,     Barry Rosas OT  [address]  [address]  [facility] [address]  Fax  [phone]      By co-signing this Plan of Care (POC) you agree to the following:  I have reviewed the the Plan of Care established by the therapist within this document and certify that the services are skilled and medically necessary. I have reviewed the plan and recommend that these services continue to meet the goals stated in this document.    PHYSICIAN SIGNATURE: __________________________________     DATE: ___________________  TIME: _____________           Occupational Therapy Plan of Care 24   Effective from: 2024  Effective to: 3/5/2024    Plan ID: [id number]            Participants as of Finalize on 2024    Name Type Comments Contact Info    Barry Rosas OT Occupational Therapist      Chris Wang MD Referring Physician  [phone]       Last Progress Notes Note     Author: Barry Rosas OT Status: Signed Last edited: 2024  3:30 PM       Duplicate note for purposes of resending OT POC.    Occupational Therapy Progress Note    [facility] Fax: [phone]    OT RE-EVALUATION FOR OUTPATIENT THERAPY    Patient: Jennifer Sams   MRN: [id number]  : 1945 79 y.o.    Date of Visit: 2024    New Certification Dates: 24 through 24    Recommended Frequency & Duration:  2 times/week for up to 4 weeks         Diagnosis:   1. Acute ischemic left MCA stroke (CMS/HCC)    2. Lack of coordination        Chief Complaints:  No chief complaint on file.      Precautions:        TODAY'S VISIT:    Time In Session:  Start Time: 1615 (Patient arrived late to scheduled appt)  Stop Time: 1700  Time Calculation (min): 45 min   General Information - 24 1617          Session Details    Document  Type re-evaluation        General Information    Onset of Illness/Injury or Date of Surgery 06/26/23     Referring Physician Dr. Haywood     History of present illness/functional impairment The patient is a 78-year-old  female with a history of AAA, heart block s/p cardiac pacemaker, glaucoma, hyperlipidemia, coronary artery disease, NSTEMI, who presented to [facility] on June 26 after she has not been seen by her friends for a few days.  Patient lives alone and when she was found by EMS she was confused and combative.  In the emergency department she was aphasic with right-sided weakness and hypertensive to the 190 systolic.  CT scan of the head revealed an acute infarct in left MCA with mild bleeding.  Echo showed an EF of 43% and a bubble study was negative.  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  Later this was transitioned to apixaban.  She was continued on statin for secondary prevention.  She required virtual shivani while in the hospital but this was not effective.  She was evaluated by speech therapy and cleared for a soft and bite-size diet with moderately thick liquids.'     Patient/Family/Caregiver Comments/Observations Patient with caregiver Thelma who reports that she has been dealing with sickness this week and lethargic         Services    Do You Speak a Language Other Than English at Home? no                      Pain/Vitals - 02/06/24 1617          Pain Assessment    Currently in pain No/Denies                    OT - 02/06/24 1801          Occupational Therapy Encounter Type Details    Occupational Therapy Specialty Traditional Neuro Program OT        OT Frequency and Duration    Frequency of treatment 2 times/week     OT Duration 4 weeks     OT Cert From 02/06/24     OT Cert To 03/05/24     Date OT POC was sent to provider 02/06/24     Signed OT Plan of Care received?  Yes                     Assessment - 02/07/24 1520          Assessment    Plan of Care reviewed and patient/family in agreement Yes     System Pathology/Pathophysiology Noted neuromuscular     Functional Limitations in Following Categories self-care;home management;community/leisure     Problem List: Occupational Therapy coordination impaired;impaired cognition;sensation decreased     Rehab Potential/Prognosis: Occupational Therapy good, to achieve stated therapy goals     Clinical Assessment Patient was seen in OP OT for improved functional use of RUE s/p CVA and re-evaluation was conducted today. Patient was seen for initial OT evaluation on 8/16/2023 and last re-evaluated on 11/14/23, she has participated in 32 Occupational Therapy sessions, including this visit. At initial evaluation she presented with deficits related to right hand range of motion, fine motor coordination at RUE, right hand dexterity, right hand  and pinch strength and performance with ADLs and IADLs. Patient arrives accompanied by caregiver Thelma today who reports that patient has been dealing with illness this week which has impacted her energy; testing today likely impacted by this setback in energy. The following objective measures were administered today: Patient able to grasp and place 3 pegs in pegboard during allotted 3 minutes of 9 hole pegboard (baseline: 3 pegs) indicating continued limitation in fine motor control at right hand, Box and Block test score is decreased to 23 blocks (re-eval: 28),  strength at right hand is 5.5 lbs average (re-eval: 6 lbs). Subjectively, Thelma reports several instances of increased automatic use of RUE including with wiping face or holding light items. Thelma reports patient has improved performance of ADLs and IADLs and now more consistant with tying shoes, washing her hair, and cleaning dishes. Continued functional limitations include meal preparation due to difficulty with bilateral coordination and cutting  food. Results of today's re-evaluation likely impacted by low energy level due to recent illness and not indicative of patient progress, which has been observed subjectively by this writer and caregiver Thelma. Significant functional limitations remain and patient can benefit from extension of current POC, 2x/ week for 4 weeks to address aforementioned functional deficits and transition focus of OT to sustainable HEP for continued recovery following discharge.     Plan and Recommendations task-oriented practice, fine and gross motor, functional tasks with right hand     Planned Services CPT 21169 Neuromuscular Reeducation;CPT 82837 Therapeutic activities;CPT 86735 Self-care/Home management training;CPT 97023 Hot/Cold Packs therapy                     OBJECTIVE MEASUREMENTS/DATA:    BADL/IADL    BADL/IADL - 02/06/24 1621          BADL Interventions Assessment    Upper Body Dressing Modified independence     Lower Body Dressing Modified independence     Lower body dressing comments Some improvement with tying shoe laces     Bathing Modified independence     Bathing comments Using shower chair and grab bars, reliant on use of LUE. Now washing her hair 70% of time.     Toileting Independent     Grooming Modified independence     Grooming comments Usually automatic toothbrush. Able to brush hair but difficulty with blow drying hair     Eating Modified independence     Eating comments Reliant on LUE        IADL/Home Interventions Assessment    Care of Others Independent     Care of Others comments Using LUE to clean litterbox, feed and water. Challenged by opening wet food.     Home management/maintenance Independent     Home management/maintenance comments Laundry, light cleaning, washing dishes, organizing/straightening up     Meal prep / clean up Maximum assist (25% patient effort)     Meal prep / clean up comments Not able prepare meals     Other (comments) Home health aid comes 2 times per week and performs light  "housekeeping, reminder about medication, and makes some meals                   Work and School     Work and School Assessment - 02/06/24 1621          Work/School/Leisure Assessment    Job Performance (comments) Retired     Leisure / Social Participation (comments) Patient reports that she is \"bored\"                   Gross and Fine Motor     Gross and Fine Motor - 02/06/24 1621          Hand  Strength Testing    Right Hand, Setting 2 6/5; Average: 5.5 lbs   Signs of discomfort demonstrated with testing today                  Outcome Measures    Outcome Measures - 02/06/24 1621          Objective Outcome Measures    RIGHT hand: Box and Blocks Assessment 23     9 Hole Peg Test - RIGHT HAND TIME 180   Removal only 32.46    9 Hole Peg Test - COMMENTS Patient placed 3 pegs into board today in 3 minutes                     ROM and MMT          11/28/2023    16:29 1/4/2024    19:06 1/11/2024    19:18 1/18/2024    17:11 3/26/2024    14:00 4/23/2024    17:00   OT UE ROM Measurements   AROM: Right Shoulder Flexion     150 degrees 135 degrees   AROM: Right Shoulder ABD     135 degrees       unable to move through full sagittal pain 91 degrees       limited by pain   AROM: Right Shoulder IR     20 degrees       hard end feel and pain --        WFL   AROM: Right Shoulder ER     --        WFL    AROM: Right Elbow Flex/Ext     WFL    AROM: Right Wrist Flexion     60 degrees    AROM: Right Wrist Extension     55 degrees    AROM: Right Wrist UD     50 degrees    AROM: Right Wrist RD     20 degrees    OT Cervical/Lumbar/Other ROM Measurements   Other: Girth Measurement/Comments  Right IF: P1-5.9 cm, PIP- 6cm, P2- 5.3 cm, DIP- 5.1 cm // Left IF: P1-5.6 cm, PIP-5.5 cm, P2- 4.9 cm, DIP-4.9 cm // Right MCP [place]- 17.5 cm Right IF: P1-5.8 cm, PIP- 5.9cm, P2- 5.1 cm, DIP- 4.9 // Right MCP [place]- 17.5 cm Right IF: P1-5.9 cm, PIP- 5.9cm, P2- 5.2 cm, DIP- 4.9 // Right MCP [place]- 17.5 cm          .5 cm MF to DPC     OT UE MMT   Left " Shoulder Flexion (4+/5) good plus        Left Shoulder Extension (4+/5) good plus        Left Shoulder ADD (4+/5) good plus        Left Shoulder ABD (4+/5) good plus        Left Shoulder IR (4+/5) good plus        Left Shoulder ER (4+/5) good plus        Left Elbow Flexion (4+/5) good plus        Left Elbow Extension (4+/5) good plus        Left Forearm Supination (4+/5) good plus        Left Forearm Pronation (4+/5) good plus        Left Wrist Flexion (4/5) good        Left Wrist Extension (4/5) good        Left Wrist UD (4/5) good        Left Wrist RD (4/5) good          Outcome Measures          12/14/2023    17:12 1/18/2024    17:11 2/6/2024    16:21 3/26/2024    14:10 4/23/2024    17:12 4/30/2024    21:59   OT OBJECTIVE Outcome Measures   9 Hole Peg Test - Right Hand 180       3 pegs placed 180       14.82- removal only 180       Removal only 32.46   --        Unable to complete   9 Hole Peg Test - Left Hand  25.84       removing only       9 Hole Peg Test - Comments  Patient able to place 5 pegs in pegboard in 3 minutes at right hand Patient placed 3 pegs into board today in 3 minutes      Right Hand Box and Blocks 24 23 23 15 21    Left Hand Box and Blocks    48         Today's Treatment:      OT NEURO FLOW SHEET    EXERCISES  Initial Evaluation  Progress Note 1  Progress Note 2  Re-evaluation  Progress note 3  Progress note 4  Re-evaluation CURRENT SESSION  8/16/2023  9/14/2023  10/23/23  11/14/23  12/14/24  1/18/24  2/6/24 TIME      45 min   NEURO RE-ED  CPT 71863 TOTAL TIME FOR SESSION 8-22 Minutes   AAROM/AROM     Strengthening      Strength   Re-assessed for re-evaluation    Gross Motor Control Re-assessed for re-evaluation    Fine Motor Control Re-assessed for re-evaluation    Sitting Jennifer/Balance     Standing Jennifer/Balance     Sensory re-education     Graded motor imagery      Retraining     THERE ACT  CPT 84689'  TOTAL TIME FOR SESSION 23-37 Minutes   Pain, Vitals, Meds, Etc. Vitals taken, pain  assessed/monitored      Assessment Patient was 100% full participant in OT re-evaluation. Metrics taken today included 9 hole peg test, box and block test, and  strength.    HEP  12/15/23: Added towel grasp and fold exercise, right hand only; patient expressed good understanding.    11/2/23: Review pinch strengthening positions today for improved carry over    10/26/23: Added soft theraputty for right hand pinch (lateral pinch, tip pinch, 3 jaw patti); patient expressed good understnading    Discussed carry over with use of SF splint    9/28/23: Laterality exercise added to HEP; patient and caregiver expressed good understanding    Right hand stretching    9/21/2023:   -Educated on use of rice/bean sensory bin, massage, sensory re-education with deep pressure and textures. Issued tubigrip for RUE sensory re-education and advised to wear 1x per day. Continued to discuss edema management.     Functional Mobility     Transfers     THERE EX  CPT 96145 TOTAL TIME FOR SESSION Not performed   PROM     Activity Tolerance     SELF-CARE  CPT 34246 TOTAL TIME FOR SESSION Not performed   Pt Education/Safety     ADL Training     IADL Training     MODALITIES  CPT 20260 TOTAL TIME FOR SESSION Not performed   Ice/Heat     ATTENDED E-STIM  CPT 38990 TOTAL TIME FOR SESSION Not performed   Attended E-Stim     SPLINTING  CPT 94938 TOTAL TIME FOR SESSION Not performed   Fabrication/Check Out     Fit     Training     GROUP  CPT 24342 TOTAL TIME FOR SESSION Not performed             Normative Range for Female 75+ Years of Age       Right Hand   Left Hand      Initial Evaluation Progress Note 1 Norm Initial Evaluation Progress Note 1 Norm    Strength 0 lbs 0 lbs 42.6 lbs 30 lbs 30 lbs 37.6 lbs   Lateral Pinch N/T 2 lbs 12.6 lbs N/T 14 lbs 11.4 lbs   Three Jaw Patti N/T 2 lbs (assist for position) 12 lbs N/T 11 lbs 11.5 lbs   Tip Pinch N/T N/T 9.6 lbs N/T 9 lbs 9.3 lbs   Box and Block 4 blocks 22 blocks 65 blocks 50 blocks 56 blocks  63.6 blocks   Nine Hole Peg (71+ age) Unable to complete Unable to complete 22.49 seconds N/T 24.55 seconds 24.11 seconds       Goals:     Goals        OT Neuro/Deconditioning Goals        Short Term Goals Time Frame Result Comment/Progress   Assess gross motor control via Box and Block Assessment  4 weeks Met 9/14: R: 22; L: 56   Assess fine motor control via 9 hole peg test 4 weeks Met  9/14: R: Unable; L: 24.55   Improved automatic fine motor use at right hand with routine tasks (e.g. eating, writing, brushing teeth) to 25% of baseline  4 weeks Ongoing     Explore adaptive visual strategies to encourage looking to right side 4 weeks Met    Incorporate right hand in at least 2 self-care activities 4 weeks Met    Carry over HEP at least 3 times per week  4 weeks Met     Score at least 32 on Box and block test 4 weeks Ongoing 2/7/24: 23 blocks  3/26/24: 15 blocks  4/23/24: 21 blocks   Increase right hand  strength to 8 lbs 4 weeks Met    Engage in bilateral fine motor coordination task to open food or self-care containers with supervision during 4/6 trials for increased independence with meal preparation.  4 weeks New goal    Complete at least 4 activities from RUE modified constraint induced movement therapy activity log across 3 weeks with assistance as needed. 4 weeks Met    Engage in simulated or real life food cutting activity using bilateral hands with no more than minimal assistance x 2 trials.  4 weeks Met    Perform RUE scapular internal rotation through at least 45 degrees with no more than 1 point increase in pain in order to promote joint mobilization and reduce scapular adhesion.  4 weeks New goal RE 2/26/24: 20 degrees, hard end feel and pain   Complete right hand three jaw patti or tip pinch activity to retrieve 1x1 inch block and place in target area with short opponens splint donned or manual inhibition of excessive opposition x 10 repetitions across 2 trials for improved coordination 4 weeks  New goal       Long Term Goals Time Frame Result Comment/Progress   Improve gross motor control as evidenced by at least 10 block improved at RUE with Box and Block Assessment for improved ability to perform ADLs and IADLs  12 weeks Met     Patient will complete 9 hole peg test in under 3 minutes 12 weeks Ongoing    Decrease distance from Right MF to DPC to 2 cm for improved functional grasp at right hand 12 weeks Met     Improved automatic fine motor use at right hand with routine tasks (e.g. eating, writing, brushing teeth) to 75% of baseline 12 weeks Ongoing     Increase right hand  strength to at least 15 lbs 12 weeks Ongoing 3/26/24: 6 lbs  4/23/24: 11 lbs   Increase Box and Block score to at least 40 12 weeks Ongoing 2/7/24: 23 blocks  3/26/24: 15 blocks                      This 79 y.o. year old female presents to OT with above stated diagnosis. Occupational Therapy evaluation reveals coordination impaired, impaired cognition, sensation decreased resulting in self-care, home management, community/leisure limitations. Jennifer Sams will benefit from skilled OT services to address limitation, work towards rehab and patient goals and maximize PLOF of chosen ADLs.    Planned Services: The patient’s treatment will include CPT 57040 Neuromuscular Reeducation, CPT 01375 Therapeutic activities, CPT 94079 Self-care/Home management training, CPT 94979 Hot/Cold Packs therapy, .    Barry Rosas OT         Current Participants as of 5/1/2024    Name Type Comments Contact Info    Chris Wang MD Referring Physician  [phone]    Signature pending    Barry Rosas OT Occupational Therapist      Electronically signed by Barry Rosas OT at 5/1/2024 9659 EDT

## 2024-02-06 NOTE — OP SLP TREATMENT LOG
"LANGUAGE SLP FLOW SHEET    SKILL AREA CURRENT SESSION   SPEECH THERAPY: LANGUAGE  CPT 43713    MOTOR SPEECH:  Provided Auditory bombardment, picture stimulus, direct visual/aud model, pt will produce target words with /p,b,m,w,t,d,n,s,l/ in initial position on first trial, 80% of presentations.  2/6/2024  NAMING: benefits from seeing the first sound and reading cues.  Improved consistency in naming pictures and reading cues with p, b,m, l, n.      2/1/2024  Naming ted: 3/6 provided phonemic cues.   Sentence completion with opposits: [facility] 1 P61-9/10     1/23/2024  Bus, bird, bike, bear,bath, boat,   Pencil, pig, meagan, pickles, party, pony, poodle, pie, pants, parrot, peach, parrot, pizza,     Unable to produce /t, d/ in isolation.       1/18/2024 (I) naming provided pictures and words.   /p /80%, provided a visual picture stimulus  /b/ 70% provided a visual picture stimulus    /t/ isolation- unable   /t/ CV: direct model, 50% of presentation limited to CV, sent home practice words.     1/11/2024  Direct model: with [id number].   /p/  6/9, 7/9  /b/ 6/9      Fading cues from above goal: Pt will produce CVCV, VCV and CVC syllables 80% of presentations.  2/6/2024  Improving /m, b, p/ in 1-2 syllable word pictures with cues to target phoneme.     1/11/2023  /b/ - CVC, CVCV- direct model       SPOKEN LANG EXP  Given simple picture cue pt will produce simple sentences of 3-4 words with simplified grammatical form and approximated content words to convey meaning, Min A, 80% of presentations.    1/18/2024  Verb ted: \"test\" direct model required, articulatory imprecision noted.    Aud comp F4 verb identification: 10/10  Reading comp: 6/10 (I),therapist read words 8/10.     1/4/2024  Advanced Language (comp) build  Difficulty with L5-6, +1 extra word- 40%  L5: Nouns, non-reversible: 2/5, 40%  L6: 1 pronoun, 1 nouns, non reversible : 2/5, 40%-Additional 3 with hint    12/28/2023  2-5 words unscramble to photograph.  " "90%:  1 noun, simple sentence 2/2  1 pronoun, simple sentence 2/2  1 noun present progressive 1/2  1 pronoun, present progressive 2/2  2 nouns, non-reversible 2023  Pt visually unscrambled 3 words to describe a picture on Advanced Comprehension kristel build- Noun- Simple Sentences.   Pt will produce simple automatics provided mod A:  1-20  A-J  MALLY 2024  Individual  ABC-A-B-C  T-W-H-F  1-10  Apraxia Kristel:  1-10 with practice to use the visual cues.   Steen-Sa: 2024-continue  MALLY, LEONEL with apps - pt improved ability when looking at the kristel or at clinician.     2024  MALLY with slow direct model 2024  1- Mod I  ABC: A-C (I)-A-J, 70% direct model  MALLY:  with direct model.     2023  Unison: 1-10,   Fading unison: 1-10  Unison: 1-14   Unison: A-J, cues to look at therapist's lips   MALLY: one at a time with direct model    2023  Apraxia Kristel  1-10:8/10  MALYL:  , improved with therapist isolating and repeating   A-J: 7/10 Mod to max A.    2023  Apraxia Kristel:  1-10: Improving  MALLY: improving  A-Z: improving with use of words at end of ApraxiaApp       Apraxia kristel  1-10:  1-5  MALLY: very difficult      Pt will produce simple social responses, Mod A:  Name     Address (parts) 2024- continue with address  Name: Mod I- took extra time due to being upset  : Month- Mod A, date mod A    2024  Name: Mod I  : - (S)  Address: \"[address]\"        SPOKEN LANG COMP  The pt will demonstrate understanding of 2 noun in simple and present and progressive reversible sentences F4, 80% accy.   (Advanced language comprehension L1-2 Identify)  Up to 7-2024  L8:  F4: 3 nouns reversible, 85%  L9: 3 nouns, reversible, indirect object, 100%  L10: 3 nouns non-reversible , passive: 100%  L11: 3 nouns, Reversible, Passive:  66%    2024  L8:  F4: 3 nouns reversible, 77%  L9: 3 nouns, reversible, indirect object, 75%  L10: 3 nouns non-reversible , " "passive: 2024- Met, increasing stimulus  L8:  F4: 3 nouns reversible, 90%  L9: 3 nouns, reversible, indirect object, 80%  L10: 3 nouns non-reversible , passive:     Increase to paragraphs.     2024  L7: F4- 3 nouns non reversible 100%  L8:  F4: 3 nouns reversible,60%, 90%    2023  L6-7, F4 9/10  2 nouns, non-reversible passive 5/5  3 nouns non-reversible 4/5  L7-8, F4  3 nouns non-reversible 5/5  3 nouns reversible 5/5      Provided body parts (on paper), single letters, digits in 10's/100's place value, pt will demonstrate  understanding of \"point to/touch/pick up___\" commands 85% of trials.    Number therapy kristel  Number flash cards.     2024  10's place value: : 70%    2024  Letter Id (uppercase) F10:  10/16    2024- progressing  Body parts 4/5 Mod I, 5/5 (S)     Letter Id (uppercase) F4: 9/10  Letter ID (lowercase) F4: 5/10  Single digits F4: 8/10, 44/50- 88%     2024  3-4 letter words focused around body parts and letters.  Pt able to add single phoneme and name word with phonemic errors.      Body parts: f6: 90% accy.    2023  Tactus Language Kristel  Body parts F6: 9/10    2023  Tactus Language Kristel  Body parts F6: 9/10  10 place value numbers 7/7, F10.      2023  Tactus Language Kristel  Body parts F4: 9/10    2023  Letter ID: F4 7/10    2023  Letter ID: F4 5/10     Education/Strategy Training 2023  Pt expectation was discussed this date that pt is expected to look at therapist mouth and produce vocal attempt.     Other 2024  Attempt at Virtualis with pt and pt become upset.      2023  Language kristel- reading phrase completion F4: 8/10    2023  F4 Matching 2 word phrases to single picture: 8/10  Phrase completion: F2- 9/10    2023  Pt reports losing weight because unable to cook and read cook books,  Not enjoying food people bring.    2023  Pt was able to share that Thelma's dog .         "

## 2024-02-06 NOTE — LETTER
Dear DR. Haywood,    Thank you for this referral. Please review the attached notes and plan of care for your approval.  Please contact our department with any questions.     Sincerely,     Barry Rosas OT  [address]  [address]  [facility] [address]      By co-signing this Plan of Care (POC) you agree to the following:  I have reviewed the the Plan of Care established by the therapist within this document and certify that the services are skilled and medically necessary. I have reviewed the plan and recommend that these services continue to meet the goals stated in this document.    PHYSICIAN SIGNATURE: __________________________________     DATE: ___________________  TIME: _____________           Occupational Therapy Plan of Care 24   Effective from: 2024  Effective to: 3/5/2024    Plan ID: [id number]            Participants as of Finalize on 2024    Name Type Comments Contact Info    Barry Rosas OT Occupational Therapist      Liv Haywood MD Referring Provider  [phone]    Chris Wang MD PCP - General  [phone]       Last Progress Notes Note     Author: Barry Rosas OT Status: Signed Last edited: 2024  4:00 PM         Occupational Therapy Progress Note    [facility] Fax: [phone]    OT RE-EVALUATION FOR OUTPATIENT THERAPY    Patient: Jennifer Sams   MRN: [id number]  : 1945 79 y.o.    Referring Physician: Liv Haywood, *  Date of Visit: 2024    New Certification Dates: 24 through 24    Recommended Frequency & Duration:  2 times/week for up to 4 weeks         Diagnosis:   1. Acute ischemic left MCA stroke (CMS/HCC)    2. Lack of coordination        Chief Complaints:  No chief complaint on file.      Precautions:        TODAY'S VISIT:    Time In Session:  Start Time: 1615 (Patient arrived late to scheduled appt)  Stop Time: 1700  Time Calculation (min): 45 min   General Information - 24 1618         Session Details    Document Type re-evaluation        General Information    Onset of Illness/Injury or Date of Surgery 06/26/23     Referring Physician Dr. Haywood     History of present illness/functional impairment The patient is a 78-year-old  female with a history of AAA, heart block s/p cardiac pacemaker, glaucoma, hyperlipidemia, coronary artery disease, NSTEMI, who presented to [facility] on June 26 after she has not been seen by her friends for a few days.  Patient lives alone and when she was found by EMS she was confused and combative.  In the emergency department she was aphasic with right-sided weakness and hypertensive to the 190 systolic.  CT scan of the head revealed an acute infarct in left MCA with mild bleeding.  Echo showed an EF of 43% and a bubble study was negative.  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  Later this was transitioned to apixaban.  She was continued on statin for secondary prevention.  She required virtual shivani while in the hospital but this was not effective.  She was evaluated by speech therapy and cleared for a soft and bite-size diet with moderately thick liquids.'     Patient/Family/Caregiver Comments/Observations Patient with caregiver Thelma who reports that she has been dealing with sickness this week and lethargic         Services    Do You Speak a Language Other Than English at Home? no                  Pain/Vitals - 02/06/24 1617        Pain Assessment    Currently in pain No/Denies                OT - 02/06/24 1801        Occupational Therapy Encounter Type Details    Occupational Therapy Specialty Traditional Neuro Program OT        OT Frequency and Duration    Frequency of treatment 2 times/week     OT Duration 4 weeks     OT Cert From 02/06/24     OT Cert To 03/05/24     Date OT POC was sent to provider 02/06/24     Signed OT Plan of Care  received?  Yes                Assessment - 02/07/24 1520        Assessment    Plan of Care reviewed and patient/family in agreement Yes     System Pathology/Pathophysiology Noted neuromuscular     Functional Limitations in Following Categories self-care;home management;community/leisure     Problem List: Occupational Therapy coordination impaired;impaired cognition;sensation decreased     Rehab Potential/Prognosis: Occupational Therapy good, to achieve stated therapy goals     Clinical Assessment Patient was seen in OP OT for improved functional use of RUE s/p CVA and re-evaluation was conducted today. Patient was seen for initial OT evaluation on 8/16/2023 and last re-evaluated on 11/14/23, she has participated in 32 Occupational Therapy sessions, including this visit. At initial evaluation she presented with deficits related to right hand range of motion, fine motor coordination at RUE, right hand dexterity, right hand  and pinch strength and performance with ADLs and IADLs. Patient arrives accompanied by caregiver Thelma today who reports that patient has been dealing with illness this week which has impacted her energy; testing today likely impacted by this setback in energy. The following objective measures were administered today: Patient able to grasp and place 3 pegs in pegboard during allotted 3 minutes of 9 hole pegboard (baseline: 3 pegs) indicating continued limitation in fine motor control at right hand, Box and Block test score is decreased to 23 blocks (re-eval: 28),  strength at right hand is 5.5 lbs average (re-eval: 6 lbs). Subjectively, Thelma reports several instances of increased automatic use of RUE including with wiping face or holding light items. Thelma reports patient has improved performance of ADLs and IADLs and now more consistant with tying shoes, washing her hair, and cleaning dishes. Continued functional limitations include meal preparation due to difficulty with bilateral  coordination and cutting food. Results of today's re-evaluation likely impacted by low energy level due to recent illness and not indicative of patient progress, which has been observed subjectively by this writer and caregiver Thelma. Significant functional limitations remain and patient can benefit from extension of current POC, 2x/ week for 4 weeks to address aforementioned functional deficits and transition focus of OT to sustainable HEP for continued recovery following discharge.     Plan and Recommendations task-oriented practice, fine and gross motor, functional tasks with right hand     Planned Services CPT 29736 Neuromuscular Reeducation;CPT 11787 Therapeutic activities;CPT 25825 Self-care/Home management training;CPT 15412 Hot/Cold Packs therapy                 OBJECTIVE MEASUREMENTS/DATA:    BADL/IADL    BADL/IADL - 02/06/24 1621        BADL Interventions Assessment    Upper Body Dressing Modified independence     Lower Body Dressing Modified independence     Lower body dressing comments Some improvement with tying shoe laces     Bathing Modified independence     Bathing comments Using shower chair and grab bars, reliant on use of LUE. Now washing her hair 70% of time.     Toileting Independent     Grooming Modified independence     Grooming comments Usually automatic toothbrush. Able to brush hair but difficulty with blow drying hair     Eating Modified independence     Eating comments Reliant on LUE        IADL/Home Interventions Assessment    Care of Others Independent     Care of Others comments Using LUE to clean litterbox, feed and water. Challenged by opening wet food.     Home management/maintenance Independent     Home management/maintenance comments Laundry, light cleaning, washing dishes, organizing/straightening up     Meal prep / clean up Maximum assist (25% patient effort)     Meal prep / clean up comments Not able prepare meals     Other (comments) Home health aid comes 2 times per week and  "performs light housekeeping, reminder about medication, and makes some meals               Work and School     Work and School Assessment - 02/06/24 1621        Work/School/Leisure Assessment    Job Performance (comments) Retired     Leisure / Social Participation (comments) Patient reports that she is \"bored\"               Gross and Fine Motor     Gross and Fine Motor - 02/06/24 1621        Hand  Strength Testing    Right Hand, Setting 2 6/5; Average: 5.5 lbs   Signs of discomfort demonstrated with testing today              Outcome Measures    Outcome Measures - 02/06/24 1621        Objective Outcome Measures    RIGHT hand: Box and Blocks Assessment 23     9 Hole Peg Test - RIGHT HAND TIME 180   Removal only 32.46    9 Hole Peg Test - COMMENTS Patient placed 3 pegs into board today in 3 minutes                 ROM and MMT        9/14/2023    18:07 11/28/2023    16:29 1/4/2024    19:06 1/11/2024    19:18 1/18/2024    17:11   OT Cervical/Lumbar/Other ROM Measurements   Other: Girth Measurement/Comments   Right IF: P1-5.9 cm, PIP- 6cm, P2- 5.3 cm, DIP- 5.1 cm // Left IF: P1-5.6 cm, PIP-5.5 cm, P2- 4.9 cm, DIP-4.9 cm // Right MCP [place]- 17.5 cm Right IF: P1-5.8 cm, PIP- 5.9cm, P2- 5.1 cm, DIP- 4.9 // Right MCP [place]- 17.5 cm Right IF: P1-5.9 cm, PIP- 5.9cm, P2- 5.2 cm, DIP- 4.9 // Right MCP [place]- 17.5 cm          .5 cm MF to DPC   OT UE MMT   Right Shoulder Flexion (4+/5) good plus       Left Shoulder Flexion (4+/5) good plus (4+/5) good plus      Right Shoulder Extension (4+/5) good plus       Left Shoulder Extension (4+/5) good plus (4+/5) good plus      Right Shoulder ADD (4+/5) good plus       Left Shoulder ADD (4+/5) good plus (4+/5) good plus      Right Shoulder ABD (4+/5) good plus       Left Shoulder ABD (4+/5) good plus (4+/5) good plus      Right Shoulder IR (3+/5) fair plus       Left Shoulder IR (4+/5) good plus (4+/5) good plus      Right Shoulder ER (4/5) good       Left Shoulder ER (4+/5) good " plus (4+/5) good plus      Right Elbow Flexion (4+/5) good plus       Left Elbow Flexion (4+/5) good plus (4+/5) good plus      Right Elbow Extension (4+/5) good plus       Left Elbow Extension (4+/5) good plus (4+/5) good plus      Right Forearm Supination (4/5) good       Left Forearm Supination (4+/5) good plus (4+/5) good plus      Right Forearm Pronation (4/5) good       Left Forearm Pronation (4+/5) good plus (4+/5) good plus      Right Wrist Flexion (3+/5) fair plus       Left Wrist Flexion (4/5) good (4/5) good      Right Wrist Extension (3+/5) fair plus       Left Wrist Extension (4/5) good (4/5) good      Right Wrist UD (2/5) poor       Left Wrist UD (4/5) good (4/5) good      Right Wrist RD (2/5) poor       Left Wrist RD (4/5) good (4/5) good        Outcome Measures        10/26/2023    14:01 11/2/2023    13:20 11/14/2023    14:20 12/14/2023    17:12 1/18/2024    17:11 2/6/2024    16:21   OT OBJECTIVE Outcome Measures   9 Hole Peg Test - Right Hand 48.6       Removing pegs only 30.88       peg removal only 16.65       Removing only using lateral pinch 180       3 pegs placed 180       14.82- removal only 180       Removal only 32.46   9 Hole Peg Test - Left Hand   24.06  25.84       removing only    9 Hole Peg Test - Comments     Patient able to place 5 pegs in pegboard in 3 minutes at right hand Patient placed 3 pegs into board today in 3 minutes   Right Hand Box and Blocks   53 24 23 23   Left Hand Box and Blocks   28      Other Outcome Measure   Right MF to DPC = 1.5 cm          Today's Treatment:      OT NEURO FLOW SHEET    EXERCISES  Initial Evaluation  Progress Note 1  Progress Note 2  Re-evaluation  Progress note 3  Progress note 4  Re-evaluation CURRENT SESSION  8/16/2023  9/14/2023  10/23/23  11/14/23  12/14/24  1/18/24  2/6/24 TIME      45 min   NEURO RE-ED  CPT 65874 TOTAL TIME FOR SESSION 8-22 Minutes   AAROM/AROM     Strengthening      Strength   Re-assessed for re-evaluation    Gross  Motor Control Re-assessed for re-evaluation    Fine Motor Control Re-assessed for re-evaluation    Sitting Jennifer/Balance     Standing Jennifer/Balance     Sensory re-education     Graded motor imagery      Retraining     THERE ACT  CPT 07831'  TOTAL TIME FOR SESSION 23-37 Minutes   Pain, Vitals, Meds, Etc. Vitals taken, pain assessed/monitored      Assessment Patient was 100% full participant in OT re-evaluation. Metrics taken today included 9 hole peg test, box and block test, and  strength.    HEP  12/15/23: Added towel grasp and fold exercise, right hand only; patient expressed good understanding.    11/2/23: Review pinch strengthening positions today for improved carry over    10/26/23: Added soft theraputty for right hand pinch (lateral pinch, tip pinch, 3 jaw patti); patient expressed good understnading    Discussed carry over with use of SF splint    9/28/23: Laterality exercise added to HEP; patient and caregiver expressed good understanding    Right hand stretching    9/21/2023:   -Educated on use of rice/bean sensory bin, massage, sensory re-education with deep pressure and textures. Issued tubigrip for RUE sensory re-education and advised to wear 1x per day. Continued to discuss edema management.     Functional Mobility     Transfers     THERE EX  CPT 14877 TOTAL TIME FOR SESSION Not performed   PROM     Activity Tolerance     SELF-CARE  CPT 03910 TOTAL TIME FOR SESSION Not performed   Pt Education/Safety     ADL Training     IADL Training     MODALITIES  CPT 11366 TOTAL TIME FOR SESSION Not performed   Ice/Heat     ATTENDED E-STIM  CPT 64538 TOTAL TIME FOR SESSION Not performed   Attended E-Stim     SPLINTING  CPT 70514 TOTAL TIME FOR SESSION Not performed   Fabrication/Check Out     Fit     Training     GROUP  CPT 91400 TOTAL TIME FOR SESSION Not performed             Normative Range for Female 75+ Years of Age       Right Hand   Left Hand      Initial Evaluation Progress Note 1 Norm Initial Evaluation  Progress Note 1 Norm    Strength 0 lbs 0 lbs 42.6 lbs 30 lbs 30 lbs 37.6 lbs   Lateral Pinch N/T 2 lbs 12.6 lbs N/T 14 lbs 11.4 lbs   Three Jaw Yonatan N/T 2 lbs (assist for position) 12 lbs N/T 11 lbs 11.5 lbs   Tip Pinch N/T N/T 9.6 lbs N/T 9 lbs 9.3 lbs   Box and Block 4 blocks 22 blocks 65 blocks 50 blocks 56 blocks 63.6 blocks   Nine Hole Peg (71+ age) Unable to complete Unable to complete 22.49 seconds N/T 24.55 seconds 24.11 seconds       Goals:     Goals     • OT Neuro/Deconditioning Goals        Short Term Goals Time Frame Result Comment/Progress   Assess gross motor control via Box and Block Assessment  4 weeks Met 9/14: R: 22; L: 56   Assess fine motor control via 9 hole peg test 4 weeks Met  9/14: R: Unable; L: 24.55   Improved automatic fine motor use at right hand with routine tasks (e.g. eating, writing, brushing teeth) to 25% of baseline  4 weeks Ongoing     Explore adaptive visual strategies to encourage looking to right side 4 weeks Met    Incorporate right hand in at least 2 self-care activities 4 weeks Met    Carry over HEP at least 3 times per week  4 weeks Met     Score at least 32 on Box and block test 4 weeks Ongoing 2/7/24: 23 blocks   Increase right hand  strength to 8 lbs 4 weeks Met       Long Term Goals Time Frame Result Comment/Progress   Improve gross motor control as evidenced by at least 10 block improved at RUE with Box and Block Assessment for improved ability to perform ADLs and IADLs  12 weeks Met     Patient will complete 9 hole peg test in under 3 minutes 12 weeks Ongoing    Decrease distance from Right MF to DPC to 2 cm for improved functional grasp at right hand 12 weeks Met     Improved automatic fine motor use at right hand with routine tasks (e.g. eating, writing, brushing teeth) to 75% of baseline 12 weeks Ongoing     Increase right hand  strength to at least 15 lbs 12 weeks Ongoing    Increase Box and Block score to at least 40 12 weeks Ongoing 2/7/24: 23  blocks                    This 79 y.o. year old female presents to OT with above stated diagnosis. Occupational Therapy evaluation reveals coordination impaired, impaired cognition, sensation decreased resulting in self-care, home management, community/leisure limitations. Jennifer Sams will benefit from skilled OT services to address limitation, work towards rehab and patient goals and maximize PLOF of chosen ADLs.    Planned Services: The patient’s treatment will include CPT 20534 Neuromuscular Reeducation, CPT 65615 Therapeutic activities, CPT 98555 Self-care/Home management training, CPT 60386 Hot/Cold Packs therapy, .    Barry Rosas OT         Current Participants as of 2/7/2024    Name Type Comments Contact Info    Barry Rosas OT Occupational Therapist      Signature pending    Liv Haywood MD Referring Provider  [phone]    Signature pending    Chris Wang MD PCP - General  [phone]    Signature pending

## 2024-02-07 NOTE — PROGRESS NOTES
Occupational Therapy Progress Note    [facility] Fax: [phone]    OT RE-EVALUATION FOR OUTPATIENT THERAPY    Patient: Jennifer Sams   MRN: [id number]  : 1945 79 y.o.    Referring Physician: Liv Haywood, *  Date of Visit: 2024    New Certification Dates: 24 through 24    Recommended Frequency & Duration:  2 times/week for up to 4 weeks         Diagnosis:   1. Acute ischemic left MCA stroke (CMS/HCC)    2. Lack of coordination        Chief Complaints:  No chief complaint on file.      Precautions:        TODAY'S VISIT:    Time In Session:  Start Time: 1615 (Patient arrived late to scheduled appt)  Stop Time: 1700  Time Calculation (min): 45 min   General Information - 24 1617        Session Details    Document Type re-evaluation        General Information    Onset of Illness/Injury or Date of Surgery 23     Referring Physician Dr. Haywood     History of present illness/functional impairment The patient is a 78-year-old  female with a history of AAA, heart block s/p cardiac pacemaker, glaucoma, hyperlipidemia, coronary artery disease, NSTEMI, who presented to [facility] on  after she has not been seen by her friends for a few days.  Patient lives alone and when she was found by EMS she was confused and combative.  In the emergency department she was aphasic with right-sided weakness and hypertensive to the 190 systolic.  CT scan of the head revealed an acute infarct in left MCA with mild bleeding.  Echo showed an EF of 43% and a bubble study was negative.  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  Later this was transitioned to apixaban.  She was continued on statin for secondary prevention.  She required virtual shivani while in the hospital but this was not effective.  She was evaluated by speech therapy and cleared for a soft and bite-size diet with  moderately thick liquids.'     Patient/Family/Caregiver Comments/Observations Patient with caregiver Thelma who reports that she has been dealing with sickness this week and lethargic         Services    Do You Speak a Language Other Than English at Home? no                  Pain/Vitals - 02/06/24 1617        Pain Assessment    Currently in pain No/Denies                OT - 02/06/24 1801        Occupational Therapy Encounter Type Details    Occupational Therapy Specialty Traditional Neuro Program OT        OT Frequency and Duration    Frequency of treatment 2 times/week     OT Duration 4 weeks     OT Cert From 02/06/24     OT Cert To 03/05/24     Date OT POC was sent to provider 02/06/24     Signed OT Plan of Care received?  Yes                Assessment - 02/07/24 1520        Assessment    Plan of Care reviewed and patient/family in agreement Yes     System Pathology/Pathophysiology Noted neuromuscular     Functional Limitations in Following Categories self-care;home management;community/leisure     Problem List: Occupational Therapy coordination impaired;impaired cognition;sensation decreased     Rehab Potential/Prognosis: Occupational Therapy good, to achieve stated therapy goals     Clinical Assessment Patient was seen in OP OT for improved functional use of RUE s/p CVA and re-evaluation was conducted today. Patient was seen for initial OT evaluation on 8/16/2023 and last re-evaluated on 11/14/23, she has participated in 32 Occupational Therapy sessions, including this visit. At initial evaluation she presented with deficits related to right hand range of motion, fine motor coordination at RUE, right hand dexterity, right hand  and pinch strength and performance with ADLs and IADLs. Patient arrives accompanied by caregiver Thelma today who reports that patient has been dealing with illness this week which has impacted her energy; testing today likely impacted by this setback in energy. The following  objective measures were administered today: Patient able to grasp and place 3 pegs in pegboard during allotted 3 minutes of 9 hole pegboard (baseline: 3 pegs) indicating continued limitation in fine motor control at right hand, Box and Block test score is decreased to 23 blocks (re-eval: 28),  strength at right hand is 5.5 lbs average (re-eval: 6 lbs). Subjectively, Thelma reports several instances of increased automatic use of RUE including with wiping face or holding light items. Thelma reports patient has improved performance of ADLs and IADLs and now more consistant with tying shoes, washing her hair, and cleaning dishes. Continued functional limitations include meal preparation due to difficulty with bilateral coordination and cutting food. Results of today's re-evaluation likely impacted by low energy level due to recent illness and not indicative of patient progress, which has been observed subjectively by this writer and caregiver Thelma. Significant functional limitations remain and patient can benefit from extension of current POC, 2x/ week for 4 weeks to address aforementioned functional deficits and transition focus of OT to sustainable HEP for continued recovery following discharge.     Plan and Recommendations task-oriented practice, fine and gross motor, functional tasks with right hand     Planned Services CPT 78016 Neuromuscular Reeducation;CPT 16771 Therapeutic activities;CPT 29977 Self-care/Home management training;CPT 26928 Hot/Cold Packs therapy                 OBJECTIVE MEASUREMENTS/DATA:    BADL/IADL    BADL/IADL - 02/06/24 1621        BADL Interventions Assessment    Upper Body Dressing Modified independence     Lower Body Dressing Modified independence     Lower body dressing comments Some improvement with tying shoe laces     Bathing Modified independence     Bathing comments Using shower chair and grab bars, reliant on use of LUE. Now washing her hair 70% of time.     Toileting Independent   "   Grooming Modified independence     Grooming comments Usually automatic toothbrush. Able to brush hair but difficulty with blow drying hair     Eating Modified independence     Eating comments Reliant on LUE        IADL/Home Interventions Assessment    Care of Others Independent     Care of Others comments Using LUE to clean litterbox, feed and water. Challenged by opening wet food.     Home management/maintenance Independent     Home management/maintenance comments Laundry, light cleaning, washing dishes, organizing/straightening up     Meal prep / clean up Maximum assist (25% patient effort)     Meal prep / clean up comments Not able prepare meals     Other (comments) Home health aid comes 2 times per week and performs light housekeeping, reminder about medication, and makes some meals               Work and School     Work and School Assessment - 02/06/24 1621        Work/School/Leisure Assessment    Job Performance (comments) Retired     Leisure / Social Participation (comments) Patient reports that she is \"bored\"               Gross and Fine Motor     Gross and Fine Motor - 02/06/24 1621        Hand  Strength Testing    Right Hand, Setting 2 6/5; Average: 5.5 lbs   Signs of discomfort demonstrated with testing today              Outcome Measures    Outcome Measures - 02/06/24 1621        Objective Outcome Measures    RIGHT hand: Box and Blocks Assessment 23     9 Hole Peg Test - RIGHT HAND TIME 180   Removal only 32.46    9 Hole Peg Test - COMMENTS Patient placed 3 pegs into board today in 3 minutes                 ROM and MMT        9/14/2023    18:07 11/28/2023    16:29 1/4/2024    19:06 1/11/2024    19:18 1/18/2024    17:11   OT Cervical/Lumbar/Other ROM Measurements   Other: Girth Measurement/Comments   Right IF: P1-5.9 cm, PIP- 6cm, P2- 5.3 cm, DIP- 5.1 cm // Left IF: P1-5.6 cm, PIP-5.5 cm, P2- 4.9 cm, DIP-4.9 cm // Right MCP [place]- 17.5 cm Right IF: P1-5.8 cm, PIP- 5.9cm, P2- 5.1 cm, DIP- 4.9 // " Right MCP [place]- 17.5 cm Right IF: P1-5.9 cm, PIP- 5.9cm, P2- 5.2 cm, DIP- 4.9 // Right MCP [place]- 17.5 cm          .5 cm MF to DPC   OT UE MMT   Right Shoulder Flexion (4+/5) good plus       Left Shoulder Flexion (4+/5) good plus (4+/5) good plus      Right Shoulder Extension (4+/5) good plus       Left Shoulder Extension (4+/5) good plus (4+/5) good plus      Right Shoulder ADD (4+/5) good plus       Left Shoulder ADD (4+/5) good plus (4+/5) good plus      Right Shoulder ABD (4+/5) good plus       Left Shoulder ABD (4+/5) good plus (4+/5) good plus      Right Shoulder IR (3+/5) fair plus       Left Shoulder IR (4+/5) good plus (4+/5) good plus      Right Shoulder ER (4/5) good       Left Shoulder ER (4+/5) good plus (4+/5) good plus      Right Elbow Flexion (4+/5) good plus       Left Elbow Flexion (4+/5) good plus (4+/5) good plus      Right Elbow Extension (4+/5) good plus       Left Elbow Extension (4+/5) good plus (4+/5) good plus      Right Forearm Supination (4/5) good       Left Forearm Supination (4+/5) good plus (4+/5) good plus      Right Forearm Pronation (4/5) good       Left Forearm Pronation (4+/5) good plus (4+/5) good plus      Right Wrist Flexion (3+/5) fair plus       Left Wrist Flexion (4/5) good (4/5) good      Right Wrist Extension (3+/5) fair plus       Left Wrist Extension (4/5) good (4/5) good      Right Wrist UD (2/5) poor       Left Wrist UD (4/5) good (4/5) good      Right Wrist RD (2/5) poor       Left Wrist RD (4/5) good (4/5) good        Outcome Measures        10/26/2023    14:01 11/2/2023    13:20 11/14/2023    14:20 12/14/2023    17:12 1/18/2024    17:11 2/6/2024    16:21   OT OBJECTIVE Outcome Measures   9 Hole Peg Test - Right Hand 48.6       Removing pegs only 30.88       peg removal only 16.65       Removing only using lateral pinch 180       3 pegs placed 180       14.82- removal only 180       Removal only 32.46   9 Hole Peg Test - Left Hand   24.06  25.84       removing  only    9 Hole Peg Test - Comments     Patient able to place 5 pegs in pegboard in 3 minutes at right hand Patient placed 3 pegs into board today in 3 minutes   Right Hand Box and Blocks   53 24 23 23   Left Hand Box and Blocks   28      Other Outcome Measure   Right MF to DPC = 1.5 cm          Today's Treatment:      OT NEURO FLOW SHEET    EXERCISES  Initial Evaluation  Progress Note 1  Progress Note 2  Re-evaluation  Progress note 3  Progress note 4  Re-evaluation CURRENT SESSION  8/16/2023  9/14/2023  10/23/23  11/14/23  12/14/24  1/18/24  2/6/24 TIME      45 min   NEURO RE-ED  CPT 16304 TOTAL TIME FOR SESSION 8-22 Minutes   AAROM/AROM     Strengthening      Strength   Re-assessed for re-evaluation    Gross Motor Control Re-assessed for re-evaluation    Fine Motor Control Re-assessed for re-evaluation    Sitting Jennifer/Balance     Standing Jennifer/Balance     Sensory re-education     Graded motor imagery      Retraining     THERE ACT  CPT 56835'  TOTAL TIME FOR SESSION 23-37 Minutes   Pain, Vitals, Meds, Etc. Vitals taken, pain assessed/monitored      Assessment Patient was 100% full participant in OT re-evaluation. Metrics taken today included 9 hole peg test, box and block test, and  strength.    HEP  12/15/23: Added towel grasp and fold exercise, right hand only; patient expressed good understanding.    11/2/23: Review pinch strengthening positions today for improved carry over    10/26/23: Added soft theraputty for right hand pinch (lateral pinch, tip pinch, 3 jaw patti); patient expressed good understnading    Discussed carry over with use of SF splint    9/28/23: Laterality exercise added to HEP; patient and caregiver expressed good understanding    Right hand stretching    9/21/2023:   -Educated on use of rice/bean sensory bin, massage, sensory re-education with deep pressure and textures. Issued tubigrip for RUE sensory re-education and advised to wear 1x per day. Continued to discuss edema management.      Functional Mobility     Transfers     THERE EX  CPT 89836 TOTAL TIME FOR SESSION Not performed   PROM     Activity Tolerance     SELF-CARE  CPT 79343 TOTAL TIME FOR SESSION Not performed   Pt Education/Safety     ADL Training     IADL Training     MODALITIES  CPT 12480 TOTAL TIME FOR SESSION Not performed   Ice/Heat     ATTENDED E-STIM  CPT 22018 TOTAL TIME FOR SESSION Not performed   Attended E-Stim     SPLINTING  CPT 20987 TOTAL TIME FOR SESSION Not performed   Fabrication/Check Out     Fit     Training     GROUP  CPT 15856 TOTAL TIME FOR SESSION Not performed             Normative Range for Female 75+ Years of Age       Right Hand   Left Hand      Initial Evaluation Progress Note 1 Norm Initial Evaluation Progress Note 1 Norm    Strength 0 lbs 0 lbs 42.6 lbs 30 lbs 30 lbs 37.6 lbs   Lateral Pinch N/T 2 lbs 12.6 lbs N/T 14 lbs 11.4 lbs   Three Jaw Yonatan N/T 2 lbs (assist for position) 12 lbs N/T 11 lbs 11.5 lbs   Tip Pinch N/T N/T 9.6 lbs N/T 9 lbs 9.3 lbs   Box and Block 4 blocks 22 blocks 65 blocks 50 blocks 56 blocks 63.6 blocks   Nine Hole Peg (71+ age) Unable to complete Unable to complete 22.49 seconds N/T 24.55 seconds 24.11 seconds       Goals:     Goals     • OT Neuro/Deconditioning Goals        Short Term Goals Time Frame Result Comment/Progress   Assess gross motor control via Box and Block Assessment  4 weeks Met 9/14: R: 22; L: 56   Assess fine motor control via 9 hole peg test 4 weeks Met  9/14: R: Unable; L: 24.55   Improved automatic fine motor use at right hand with routine tasks (e.g. eating, writing, brushing teeth) to 25% of baseline  4 weeks Ongoing     Explore adaptive visual strategies to encourage looking to right side 4 weeks Met    Incorporate right hand in at least 2 self-care activities 4 weeks Met    Carry over HEP at least 3 times per week  4 weeks Met     Score at least 32 on Box and block test 4 weeks Ongoing 2/7/24: 23 blocks   Increase right hand  strength to 8 lbs 4  weeks Met       Long Term Goals Time Frame Result Comment/Progress   Improve gross motor control as evidenced by at least 10 block improved at RUE with Box and Block Assessment for improved ability to perform ADLs and IADLs  12 weeks Met     Patient will complete 9 hole peg test in under 3 minutes 12 weeks Ongoing    Decrease distance from Right MF to DPC to 2 cm for improved functional grasp at right hand 12 weeks Met     Improved automatic fine motor use at right hand with routine tasks (e.g. eating, writing, brushing teeth) to 75% of baseline 12 weeks Ongoing     Increase right hand  strength to at least 15 lbs 12 weeks Ongoing    Increase Box and Block score to at least 40 12 weeks Ongoing 2/7/24: 23 blocks                    This 79 y.o. year old female presents to OT with above stated diagnosis. Occupational Therapy evaluation reveals coordination impaired, impaired cognition, sensation decreased resulting in self-care, home management, community/leisure limitations. Jennifer Sams will benefit from skilled OT services to address limitation, work towards rehab and patient goals and maximize PLOF of chosen ADLs.    Planned Services: The patient’s treatment will include CPT 06317 Neuromuscular Reeducation, CPT 08516 Therapeutic activities, CPT 48795 Self-care/Home management training, CPT 16714 Hot/Cold Packs therapy, .    Barry Rosas, OT

## 2024-02-07 NOTE — PROGRESS NOTES
Occupational Therapy Progress Note- Sending historic OT plan of care as physician information has been updated. Please review and sign.    [facility] Fax: [phone]    OT RE-EVALUATION FOR OUTPATIENT THERAPY    Patient: Jennifer Sams   MRN: [id number]  : 1945 79 y.o.    Referring Physician: Dr. Chris Wang  Date of Visit: 2023    New Certification Dates: 23 through 24    Recommended Frequency & Duration:  2 times/week for up to 3 months         Diagnosis:   1. Lack of coordination    2. Acute ischemic left MCA stroke (CMS/HCC)        Chief Complaints:  No chief complaint on file.      Precautions:        TODAY'S VISIT:    Time In Session:  Start Time: 1405  Stop Time: 1500  Time Calculation (min): 55 min   General Information - 23 1419        Session Details    Document Type daily treatment        General Information    Onset of Illness/Injury or Date of Surgery 23     Referring Physician Dr. Haywood     History of present illness/functional impairment The patient is a 78-year-old  female with a history of AAA, heart block s/p cardiac pacemaker, glaucoma, hyperlipidemia, coronary artery disease, NSTEMI, who presented to [facility] on  after she has not been seen by her friends for a few days.  Patient lives alone and when she was found by EMS she was confused and combative.  In the emergency department she was aphasic with right-sided weakness and hypertensive to the 190 systolic.  CT scan of the head revealed an acute infarct in left MCA with mild bleeding.  Echo showed an EF of 43% and a bubble study was negative.  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  Later this was transitioned to apixaban.  She was continued on statin for secondary prevention.  She required virtual shivani while in the hospital but this was not effective.  She was evaluated by  speech therapy and cleared for a soft and bite-size diet with moderately thick liquids.'     Patient/Family/Caregiver Comments/Observations Patient presents to OT session and is agreeable to re-eval         Services    Do You Speak a Language Other Than English at Home? no                  Pain/Vitals - 11/14/23 1419        Pain Assessment    Currently in pain No/Denies                OT - 11/14/23 1419        Occupational Therapy Encounter Type Details    Occupational Therapy Specialty Traditional Neuro Program OT        OT Frequency and Duration    Frequency of treatment 2 times/week     OT Duration 3 months     OT Cert From 11/14/23     OT Cert To 02/12/24     Date OT POC was sent to provider 11/14/23     Signed OT Plan of Care received?  Yes                Assessment - 11/14/23 1419        Assessment    Plan of Care reviewed and patient/family in agreement Yes     Comments Patient is in agreeement     System Pathology/Pathophysiology Noted neuromuscular     Functional Limitations in Following Categories self-care;home management;community/leisure     Problem List: Occupational Therapy coordination impaired;impaired cognition;decreased flexibility;sensation decreased     Rehab Potential/Prognosis: Occupational Therapy good, to achieve stated therapy goals     Clinical Assessment Patient seen in OP OT for re-evaluation and arrives unaccompanied today. Patient was seen for initial evaluation on 8/16/23 and presented with significant impairments in functional use of RUE including deficits in sensation, strength, fine and gross motor control. Patient has been seen for 17 OT visits including today. Objective metrics assessed today as follow: Box and Block score at left hand is 53 blocks today (IE: 30 blocks, norms: 63.6 blocks) and at right hand is 28 blocks (IE: 4, norms: 65); 9 hole peg test score at 24.06 sec (norms: 24.11 sec); right hand unable to complete test and modified to only include removing  pegs, with improvement to 16.65 seconds todays as compared to 48.6 seconds at first measurement. AROM at right hand has improved per distance from MF to DPC, measured at 1.5 cm today as compared to 6.5 cm at IE. Right hand  strength has improved to 5.3 lbs as compared to 0 lbs at IE, and left hand  strength has improved to 36 lbs as compared to 30 lbs at IE. Subjectively patient reports improvement in independence at home with ADLs and with increase in bilateral coordination for performing routine task including tying her shoes, drying off after showering, and performing dressing. Patient continues to experience neuromuscular deficits at RUE impairing performance with ADL, IADL and leisure occupational performance at this time. Recommend exension of OT POC 2x/week for 3 months and patient is agreeable to plan.     Plan and Recommendations Extend OT POC for improved RUE ROM, strength, coordination, and functional engagement in ADL/IADL/leisure     Planned Services CPT 01463 Neuromuscular Reeducation;CPT 14057 Self-care/Home management training;CPT 21037 Therapeutic activities;CPT 94637 Hot/Cold Packs therapy                 OBJECTIVE MEASUREMENTS/DATA:    BADL/IADL    BADL/IADL - 11/14/23 1420        BADL Interventions Assessment    Upper Body Dressing Modified independence     Lower Body Dressing Modified independence     Lower body dressing comments Intermittent success with tying shoes; taking 10 minutes to get dressing     Bathing Minimum assist (75% patient effort)     Bathing comments Patient reports attempting to wash her hair but limited by impaired by impaired sensation at right side of head     Toileting Independent     Grooming Minimum assist (75% patient effort)     Grooming comments Usually automatic toothbrush. Able to brush hair but difficulty with blowing hair     Eating Minimum assist (75% patient effort)     Eating comments diet is soft food only; requires assist with cutting into pieces.  Incorporating RUE minimally        IADL/Home Interventions Assessment    Home management/maintenance Minimum assist (75% patient effort)     Home management/maintenance comments Difficulty with hand washing dishes, using LUE for dusting, gets assistance with vacuuming due to heart condition.     Meal prep / clean up Maximum assist (25% patient effort)     Meal prep / clean up comments Able to reheat in microwave but not preparing food at this time, hot or cold               Gross and Fine Motor     Gross and Fine Motor - 11/14/23 1420        Hand  Strength Testing    Left Hand, Setting 2 34/36/38; Average = 36 lbs     Right Hand, Setting 2 6/5/5; Average = 5.3 lbs               Outcome Measures    Outcome Measures - 11/14/23 1420        Objective Outcome Measures    RIGHT hand: Box and Blocks Assessment 53     LEFT hand: Box and Blocks Assessment 28     9 Hole Peg Test - RIGHT HAND TIME 16.65   Removing only using lateral pinch    9 Hole Peg Test - LEFT HAND TIME 24.06        Other Outcome Measures Used/Comments    Outcome measure used: Right MF to DPC = 1.5 cm                 ROM and MMT        9/14/2023    18:07 11/28/2023    16:29 1/4/2024    19:06 1/11/2024    19:18 1/18/2024    17:11   OT Cervical/Lumbar/Other ROM Measurements   Other: Girth Measurement/Comments   Right IF: P1-5.9 cm, PIP- 6cm, P2- 5.3 cm, DIP- 5.1 cm // Left IF: P1-5.6 cm, PIP-5.5 cm, P2- 4.9 cm, DIP-4.9 cm // Right MCP [place]- 17.5 cm Right IF: P1-5.8 cm, PIP- 5.9cm, P2- 5.1 cm, DIP- 4.9 // Right MCP [place]- 17.5 cm Right IF: P1-5.9 cm, PIP- 5.9cm, P2- 5.2 cm, DIP- 4.9 // Right MCP [place]- 17.5 cm          .5 cm MF to DPC   OT UE MMT   Right Shoulder Flexion (4+/5) good plus       Left Shoulder Flexion (4+/5) good plus (4+/5) good plus      Right Shoulder Extension (4+/5) good plus       Left Shoulder Extension (4+/5) good plus (4+/5) good plus      Right Shoulder ADD (4+/5) good plus       Left Shoulder ADD (4+/5) good plus (4+/5) good  plus      Right Shoulder ABD (4+/5) good plus       Left Shoulder ABD (4+/5) good plus (4+/5) good plus      Right Shoulder IR (3+/5) fair plus       Left Shoulder IR (4+/5) good plus (4+/5) good plus      Right Shoulder ER (4/5) good       Left Shoulder ER (4+/5) good plus (4+/5) good plus      Right Elbow Flexion (4+/5) good plus       Left Elbow Flexion (4+/5) good plus (4+/5) good plus      Right Elbow Extension (4+/5) good plus       Left Elbow Extension (4+/5) good plus (4+/5) good plus      Right Forearm Supination (4/5) good       Left Forearm Supination (4+/5) good plus (4+/5) good plus      Right Forearm Pronation (4/5) good       Left Forearm Pronation (4+/5) good plus (4+/5) good plus      Right Wrist Flexion (3+/5) fair plus       Left Wrist Flexion (4/5) good (4/5) good      Right Wrist Extension (3+/5) fair plus       Left Wrist Extension (4/5) good (4/5) good      Right Wrist UD (2/5) poor       Left Wrist UD (4/5) good (4/5) good      Right Wrist RD (2/5) poor       Left Wrist RD (4/5) good (4/5) good        Outcome Measures        10/26/2023    14:01 11/2/2023    13:20 11/14/2023    14:20 12/14/2023    17:12 1/18/2024    17:11 2/6/2024    16:21   OT OBJECTIVE Outcome Measures   9 Hole Peg Test - Right Hand 48.6       Removing pegs only 30.88       peg removal only 16.65       Removing only using lateral pinch 180       3 pegs placed 180       14.82- removal only 180       Removal only 32.46   9 Hole Peg Test - Left Hand   24.06  25.84       removing only    9 Hole Peg Test - Comments     Patient able to place 5 pegs in pegboard in 3 minutes at right hand Patient placed 3 pegs into board today in 3 minutes   Right Hand Box and Blocks   53 24 23 23   Left Hand Box and Blocks   28      Other Outcome Measure   Right MF to DPC = 1.5 cm          Today's Treatment:      OT NEURO FLOW SHEET    EXERCISES  Initial Evaluation  Progress Note 1  Progress Note 2  Re-evaluation CURRENT  SESSION  8/16/2023  9/14/2023  10/23/23  11/14/23 TIME   NEURO RE-ED  CPT 24297 TOTAL TIME FOR SESSION 0-7 Minutes   AAROM/AROM PROM and AROM composite flexion at fingers of right hand    AROM tip pinch IF to thumb for improved motor coordination    Strengthening      Strength       Gross Motor Control     Fine Motor Control     Sitting Jennifer/Balance     Standing Jennifer/Balance     Sensory re-education      Retraining     THERE ACT  CPT 96765 TOTAL TIME FOR SESSION 38-52 Minutes   Pain, Vitals, Meds, Etc. Vitals taken, pain assessed/monitored, Rest breaks provided for frustration management    Assessment Patient was 100% full participant in today's re-evaluation with objective metrics assessed as follow: Box and Block, 9 hole peg, and  strength    HEP 11/2/23: Review pinch strengthening positions today for improved carry over    10/26/23: Added soft theraputty for right hand pinch (lateral pinch, tip pinch, 3 jaw patti); patient expressed good understnading    Discussed carry over with use of SF splint    9/28/23: Laterality exercise added to HEP; patient and caregiver expressed good understanding    Right hand stretching    9/21/2023:   -Educated on use of rice/bean sensory bin, massage, sensory re-education with deep pressure and textures. Issued tubigrip for RUE sensory re-education and advised to wear 1x per day. Continued to discuss edema management.     Functional Mobility     Transfers     THERE EX  CPT 57614 TOTAL TIME FOR SESSION 0-7 Minutes   PROM     Activity Tolerance     SELF-CARE  CPT 53904 TOTAL TIME FOR SESSION 8-22 Minutes   Pt Education/Safety     ADL Training     IADL Training Patient participated in dressing set-up task with  from home today; she demonstrates ability to use RUE as functional assist and remove clothing from  without assistance.     MODALITIES  CPT 80906 TOTAL TIME FOR SESSION Not performed   Ice/Heat     ATTENDED E-STIM  CPT 45634 TOTAL TIME FOR SESSION Not  performed   Attended E-Stim     SPLINTING  CPT 05821 TOTAL TIME FOR SESSION Not performed   Fabrication/Check Out     Fit     Training     GROUP  CPT 93335 TOTAL TIME FOR SESSION Not performed             Normative Range for Female 75+ Years of Age       Right Hand   Left Hand      Initial Evaluation Progress Note 1 Norm Initial Evaluation Progress Note 1 Norm    Strength 0 lbs 0 lbs 42.6 lbs 30 lbs 30 lbs 37.6 lbs   Lateral Pinch N/T 2 lbs 12.6 lbs N/T 14 lbs 11.4 lbs   Three Jaw Yonatan N/T 2 lbs (assist for position) 12 lbs N/T 11 lbs 11.5 lbs   Tip Pinch N/T N/T 9.6 lbs N/T 9 lbs 9.3 lbs   Box and Block 4 blocks 22 blocks 65 blocks 50 blocks 56 blocks 63.6 blocks   Nine Hole Peg (71+ age) Unable to complete Unable to complete 22.49 seconds N/T 24.55 seconds 24.11 seconds       Goals:     Goals      OT Neuro/Deconditioning Goals        Short Term Goals Time Frame Result Comment/Progress   Assess gross motor control via Box and Block Assessment  4 weeks Met 9/14: R: 22; L: 56   Assess fine motor control via 9 hole peg test 4 weeks Met  9/14: R: Unable; L: 24.55   Improved automatic fine motor use at right hand with routine tasks (e.g. eating, writing, brushing teeth) to 25% of baseline  4 weeks Ongoing     Explore adaptive visual strategies to encourage looking to right side 4 weeks Met    Incorporate right hand in at least 2 self-care activities 4 weeks Met    Carry over HEP at least 3 times per week  4 weeks Met     Score at least 32 on Box and block test 4 weeks Ongoing 2/7/24: 23 blocks   Increase right hand  strength to 8 lbs 4 weeks Met       Long Term Goals Time Frame Result Comment/Progress   Improve gross motor control as evidenced by at least 10 block improved at RUE with Box and Block Assessment for improved ability to perform ADLs and IADLs  12 weeks Met     Patient will complete 9 hole peg test in under 3 minutes 12 weeks Ongoing    Decrease distance from Right MF to DPC to 2 cm for improved  functional grasp at right hand 12 weeks Met     Improved automatic fine motor use at right hand with routine tasks (e.g. eating, writing, brushing teeth) to 75% of baseline 12 weeks Ongoing     Increase right hand  strength to at least 15 lbs 12 weeks Ongoing    Increase Box and Block score to at least 40 12 weeks Ongoing 2/7/24: 23 blocks                    This 79 y.o. year old female presents to OT with above stated diagnosis. Occupational Therapy evaluation reveals coordination impaired, impaired cognition, decreased flexibility, sensation decreased resulting in self-care, home management, community/leisure limitations. Jennifer Sams will benefit from skilled OT services to address limitation, work towards rehab and patient goals and maximize PLOF of chosen ADLs.    Planned Services: The patients treatment will include CPT 35372 Neuromuscular Reeducation, CPT 29785 Self-care/Home management training, CPT 00514 Therapeutic activities, CPT 46653 Hot/Cold Packs therapy, .    Barry Rosas, OT

## 2024-02-07 NOTE — OP OT TREATMENT LOG
OT NEURO FLOW SHEET    EXERCISES  Initial Evaluation  Progress Note 1  Progress Note 2  Re-evaluation  Progress note 3  Progress note 4  Re-evaluation CURRENT SESSION  8/16/2023  9/14/2023  10/23/23  11/14/23  12/14/24  1/18/24  2/6/24 TIME      45 min   NEURO RE-ED  CPT 05288 TOTAL TIME FOR SESSION 8-22 Minutes   AAROM/AROM     Strengthening      Strength   Re-assessed for re-evaluation    Gross Motor Control Re-assessed for re-evaluation    Fine Motor Control Re-assessed for re-evaluation    Sitting Jennifer/Balance     Standing Jennifer/Balance     Sensory re-education     Graded motor imagery      Retraining     THERE ACT  CPT 61780'  TOTAL TIME FOR SESSION 23-37 Minutes   Pain, Vitals, Meds, Etc. Vitals taken, pain assessed/monitored      Assessment Patient was 100% full participant in OT re-evaluation. Metrics taken today included 9 hole peg test, box and block test, and  strength.    HEP  12/15/23: Added towel grasp and fold exercise, right hand only; patient expressed good understanding.    11/2/23: Review pinch strengthening positions today for improved carry over    10/26/23: Added soft theraputty for right hand pinch (lateral pinch, tip pinch, 3 jaw patti); patient expressed good understnading    Discussed carry over with use of SF splint    9/28/23: Laterality exercise added to HEP; patient and caregiver expressed good understanding    Right hand stretching    9/21/2023:   -Educated on use of rice/bean sensory bin, massage, sensory re-education with deep pressure and textures. Issued tubigrip for RUE sensory re-education and advised to wear 1x per day. Continued to discuss edema management.     Functional Mobility     Transfers     THERE EX  CPT 54049 TOTAL TIME FOR SESSION Not performed   PROM     Activity Tolerance     SELF-CARE  CPT 83273 TOTAL TIME FOR SESSION Not performed   Pt Education/Safety     ADL Training     IADL Training     MODALITIES  CPT 19089 TOTAL TIME FOR SESSION Not performed    Ice/Heat     ATTENDED E-STIM  CPT 81376 TOTAL TIME FOR SESSION Not performed   Attended E-Stim     SPLINTING  CPT 36583 TOTAL TIME FOR SESSION Not performed   Fabrication/Check Out     Fit     Training     GROUP  CPT 40761 TOTAL TIME FOR SESSION Not performed             Normative Range for Female 75+ Years of Age       Right Hand   Left Hand      Initial Evaluation Progress Note 1 Norm Initial Evaluation Progress Note 1 Norm    Strength 0 lbs 0 lbs 42.6 lbs 30 lbs 30 lbs 37.6 lbs   Lateral Pinch N/T 2 lbs 12.6 lbs N/T 14 lbs 11.4 lbs   Three Jaw Yonatan N/T 2 lbs (assist for position) 12 lbs N/T 11 lbs 11.5 lbs   Tip Pinch N/T N/T 9.6 lbs N/T 9 lbs 9.3 lbs   Box and Block 4 blocks 22 blocks 65 blocks 50 blocks 56 blocks 63.6 blocks   Nine Hole Peg (71+ age) Unable to complete Unable to complete 22.49 seconds N/T 24.55 seconds 24.11 seconds

## 2024-02-08 ENCOUNTER — HOSPITAL ENCOUNTER (OUTPATIENT)
Dept: OCCUPATIONAL THERAPY | Age: 79
Setting detail: THERAPIES SERIES
Discharge: HOME | End: 2024-02-08
Attending: PHYSICAL MEDICINE & REHABILITATION
Payer: MEDICARE

## 2024-02-08 ENCOUNTER — HOSPITAL ENCOUNTER (OUTPATIENT)
Dept: SPEECH THERAPY | Age: 79
Setting detail: THERAPIES SERIES
Discharge: HOME | End: 2024-02-08
Attending: PHYSICAL MEDICINE & REHABILITATION
Payer: MEDICARE

## 2024-02-08 DIAGNOSIS — I63.512 ACUTE ISCHEMIC LEFT MCA STROKE (CMS/HCC): ICD-10-CM

## 2024-02-08 DIAGNOSIS — I63.512 ACUTE ISCHEMIC LEFT MCA STROKE (CMS/HCC): Primary | ICD-10-CM

## 2024-02-08 DIAGNOSIS — M62.81 MUSCLE WEAKNESS (GENERALIZED): ICD-10-CM

## 2024-02-08 DIAGNOSIS — I63.9 CEREBROVASCULAR ACCIDENT (CVA), UNSPECIFIED MECHANISM (CMS/HCC): Primary | ICD-10-CM

## 2024-02-08 DIAGNOSIS — G81.90: ICD-10-CM

## 2024-02-08 DIAGNOSIS — R27.9 LACK OF COORDINATION: ICD-10-CM

## 2024-02-08 PROCEDURE — 97535 SELF CARE MNGMENT TRAINING: CPT | Mod: GO

## 2024-02-08 PROCEDURE — 97112 NEUROMUSCULAR REEDUCATION: CPT | Mod: GO

## 2024-02-08 PROCEDURE — 92507 TX SP LANG VOICE COMM INDIV: CPT | Mod: GN

## 2024-02-08 NOTE — LETTER
Dear DR. Haywood,    Thank you for this referral. Please review the attached notes and plan of care for your approval.  Please contact our department with any questions.     Sincerely,     Magnolia Lundy, CCC-SLP  [address]  [address]  [facility] [address]    By co-signing this Plan of Care (POC) you agree to the following:  I have reviewed the the Plan of Care established by the therapist within this document and certify that the services are skilled and medically necessary. I have reviewed the plan and recommend that these services continue to meet the goals stated in this document.    PHYSICIAN SIGNATURE: __________________________________     DATE: ___________________  TIME: _____________           Speech Therapy Plan of Care 24   Effective from: 2024  Effective to: 2024    Plan ID: [id number]                Participants as of Finalize on 2/10/2024      Name Type Comments Contact Info    Liv Haywood MD Referring Provider  [phone]    Magnolia Lundy CCC-SLP Speech Language Pathologist      Ney Aparicio MD Consulting Physician  [phone]           Last Progress Notes Note       Author: Magnolia Lundy CCC-SLP Status: Signed Last edited: 2024  4:00 PM         Speech-Language Pathology Progress Note    [facility] Fax: [phone]    SLP RE-EVALUATION FOR OUTPATIENT THERAPY    Patient: Jennifer Sams   MRN: [id number]  : 1945 79 y.o.    Referring Physician: Liv Haywood, * Dr. Aparicio  Date of Visit: 2024    New Certification Dates: 02/15/24 through 24    Recommended Frequency & Duration:  2 times/week for up to 3 months   PN: 3/15/2024    Diagnosis:   1. Cerebrovascular accident (CVA), unspecified mechanism (CMS/HCC)    2. Acute ischemic left MCA stroke (CMS/HCC)        Chief Complaints:   Chief Complaint   Patient presents with   • Speech Problem     Language- expressive and receptive communication.         Precautions:   Precautions Comments: aphasia, apraxia, pacemaker    TODAY'S VISIT:    Session Time:  Start Time: 1600  Stop Time: 1700  Time Calculation (min): 60 min   General Information - 02/08/24 1702          Session Details    Document Type re-evaluation     Mode of Treatment individual therapy        General Information    Onset of Illness/Injury or Date of Surgery 06/26/23     Referring Physician Dr. Aparicio     History of present illness/functional impairment  is a 79 y.o. female who presented to [facility] on June 26 after she was not seen by her friends for a few days.  Ms. Sams lives alone and when she was found by EMS she was confused and combative.  In the emergency department she was aphasic with right-sided weakness and hypertensive to the 190 systolic.  CT scan of the head revealed an acute infarct in left MCA with mild bleeding.  Echo showed an EF of 43% and a bubble study was negative.  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 Pt transferred to  [facility] on 7/1/2023 Principal problem  Acute ischemic left MCA stroke (CMS/HCC). PMH AAA, heart block s/p cardiac pacemaker, glaucoma, hyperlipidemia, coronary artery disease, She was evaluated by speech therapy and cleared for a soft and bite-size diet with moderately thick liquids.  7/2/2023:  Diet SB6 with mildly thick liquids (MT2)  7/6/2023: Upgraded to EC7 with thin liquids.  Pt discharged home with 24 hr care and home health services recieving speech therapy for motor speech and aphasia.  Transferred to outpatient and evaluated at [facility] on 8/18/2024.  Ms. Sams presents for her second re-eval at [facility].     Precautions Comments aphasia, apraxia, pacemaker     Limitations/Impairments safety/cognitive     Interventions communication.  Explore written, nonverbal and low tech AAC to aid in communication.     Patient/Family/Caregiver Comments/Observations Pt and POA requesting continuation of  POC                    Daily Falls Screen - 02/08/24 1948          Daily Falls Assessment    Patient reported fall since last visit No                    Pain and Vitals - 02/08/24 1947          Pain Assessment    Currently in pain No/Denies                    SLP - 02/08/24 1702          Speech Therapy    Speech Therapy Specialty Traditional Neuro Program ST        SLP Frequency and Duration    Frequency of treatment 2 times/week     Speech Duration 3 months     SLP Custom Frequency and Duration PN: 3/15/2024     SLP Cert From 02/15/24     SLP Cert To 05/08/24     Date SLP POC was sent to provider 02/08/24     Signed SLP Plan of Care received?  No                    Assessment - 02/08/24 1947          Assessment    Plan of Care reviewed and patient/family in agreement Yes     Comments Pt and POA are in agreement to extend plan of care to continue with goals directed toward motor speech, expressive and receptive language and reading comprehension.     Rehab Potential/Prognosis (SLP) good, to achieve stated therapy goals     Problem List Impaired communication     Clinical Assessment  presents for re-evaluation of present levels of speech and language communication.   demonstrates improved receptive language skills for following commands, and paragraph comprehension.  Ms. Sams demonstrates improved word retrieval and articulatory accuracy in words and phrases.  Reading skills continue to improve and have been shown to support 's motor speech.  It is recommmended that plan of care is extended with revised goals to progress skills in language expression and comprehension, reading comprehension and motor speech.  Consideration of an AAC evaluation will be further explored.     Plan and Recommendations Initiate new  POC     Planned Services [facility] [address] Speech Lang Voice Comm and/or Auditory Proc (Treatment of speech, language, voice, communication and/or hearing processing disorder)                      OBJECTIVE MEASUREMENTS/DATA:    OM: FCM/Voice/Neuro    Outcome Measures - 02/08/24 1702          Functional Communication Measures    FCM: Motor Speech 4-->Level 4     FCM: Reading 4-->Level 4     FCM: Spoken Language, Comprehension 5-->Level 5     FCM: Spoken Language, Expression 4-->Level 4                     Outcome Measures          8/16/2023    17:42 8/25/2023    16:57 11/14/2023    10:15 12/14/2023    17:09 2/8/2024    17:02   SLP Outcome Measures   FCM: Motor Speech 2-->Level 2       Goal L4  3-->Level 3       approaching 4 4-->Level 4 4-->Level 4   FCM: Reading 3-->Level 3       Goal: maintain 3, indirect therapy, improve to 4.    4-->Level 4   FCM: Spoken Language, Comprehension --        TBD, suspect L2, goal, indirect therapy-4 2-->Level 2 4-->Level 4 5-->Level 5 5-->Level 5   FCM: Spoken Language, Expression 2-->Level 2  3-->Level 3       approaching 4 4-->Level 4 4-->Level 4       Today's Treatment:     Today's Treatment:     BDAE  Eval: 8/18/2023     RE: 11/14/2023    RE: 2/8/2024   Aphasia Severity Rating Scale 1/5  2/5 2/5   CONVERSATIONAL and EXPOSITORY SPEECH        Simple Social Responses Raw Score:   Percentiles:  Raw Score: 6/7  Percentiles: 50%tile Raw Score: 6/7  Percentiles: 50%tile  Missing street number only   Complexity Index  Raw Score:    Ratio:   Percentiles:       Narrative Discourse  Short form Raw Score:   Ratio:   Percentiles:       AUDITORY COMPREHEN       Basic Word Discrimination           Raw Score: 29/37  Percentiles:20%           Raw Score: 33/37  Percentiles:40-50% Body:  shoulder, letters: T,N   #: 257    Raw Score: 33/37  Percentiles:40-50%   Commands  Standard Form  8/25/2023 Raw Score: 3/15  Percentiles: 0-10% Raw Score: 7/15  Percentiles: 10-20% Raw Score: 10/15  Percentiles: 20%      Complex Ideational Material  Short Form  8/25/2023    Raw Score: 0/4  Percentiles: 0%  Short Form  8/25/2023     Raw Score: 5/6  Percentiles: 70%  Short Form     Raw Score:  7/12  Percentiles: 40%  Long form   ARTICULATION  Agility                 ORAL EXPRESSION  Recitation, Yolis, Rhythm                 Automatized Sequences  Standard Form    Recitation: 0  Percentiles: 30%tile  Yolis: 1  Percentiles: 30%tile  Rhythm: 1  Percentiles: 60%tile     Raw Score: 0/8  Percentiles: 0%tile     Recitation: 0  Percentiles: 30%tile  Yolis: 2  Percentiles: 100%tile  Rhythm: 1  Percentiles: 60%tile     Short Form  Raw Score: 2/4  Percentiles: 20%tile   Recitation: 0  Percentiles: 30%tile  Yolis: 2  Percentiles: 100%tile  Rhythm: 2  Percentiles: 60%tile     Short Form  Raw Score: 2/4  Percentiles: 20%tile   REPETITION  Words  Standard Form     Repetition of Nonsense words     Sentences  Standard Form    Raw Score: 0  Percentiles:0 %tile     Raw Score:  Percentiles:      Raw Score:  Percentiles:     Raw Score: 3/10  Percentiles:10 %tile                      NAMING  Responsive Naming  Standard Form     [place] Naming Test  Standard Form    Raw Score:   Percentiles:        Short Form   Raw Score: 0/5  Percentiles:10% tile            (based on syllable and phoneme approximations)    Raw Score: 4  Stim cue: 0/0  PC: 2/11  MC: 9/10  Percentiles: 30  Mean: 13.9, +/-3   READING  Match case and scripts     Number matching     Picture-Word Matching     Oral Word Reading     Oral Sentence Reading     Oral Sentence Comp     Sentence/Paragraph     Comprehension    Raw Score: 7  Percentiles: 40%tile  Raw Score:   Percentiles:   Raw Score: 10  Percentiles: 100%tile  Raw Score:   Percentiles:   Raw Score:   Percentiles:   Raw Score:   Percentiles:   Raw Score:   Percentiles:   Raw Score:   Percentiles:     Raw Score: 8  Percentiles: 100%tile  Raw Score: 8/12  Percentiles: 10  Raw Score: 7  Percentiles: 20%tile       Raw Score: 9/12  Percentiles: 10-20%  Raw Score: 9  Percentiles: 60%tile    WRITING  Form     Letter Choice     Motor Facility     Primer Words     Regular Phonics     Common Irregular Words      Written Picture Naming     Narrative Writing    Raw Score:   Percentiles:   Raw Score:   Percentiles:   Raw Score:   Percentiles:   Raw Score:   Percentiles:   Raw Score:   Percentiles:   Raw Score:   Percentiles:   Raw Score:   Percentiles:   Raw Score:   Percentiles:            IE: 2023 RE: 2023   Rating Scale Profile of Speech Characteristics 1-7 scale 1-7 scale   Articulatory Agility-phoneme and syllable level -3/7   Phrase Length: longest word runs 2/7 3/7   Grammatical Form: Variety and use of morphemes 2/7 3/7   Melodic Line (Prosody) -   Paraphasia in Running Speech    Word Finding Relative to Fluency    Sentence Repetition    Auditory Comprehension -3/7      Aphasia Severity Ratin-   Conversations about familiar subjects is possible with help from the listener.  There are frequent failures to convey the idea, but the patient shares the burden of communication.          Goals:     Goals Addressed                   This Visit's Progress    • SLP Motor Goals          NEW /Revised RE=Evaluation: 2024      Goals Short-Long Time Frame Result Comment   MOTOR SPEECH  Current:4  Goal:5-6  SPOKEN LANG COMP  Current:5  Goal:  6  SPOKEN LANG EXP:  Current:4  Goal: 5  READING:   Current:4  Goal: 5 LTG 12w     Pt will improve Aphasia Severity Rating Scale from 2/5 to >/=3/5 LTG 12 w     MOTOR SPEECH  Pt will produce 1-3 syllable words   with /p,b,m,w,t,d,n,s,l/ in initial position 80% of presentations.    STG 6 w     Pt will repeat familiar greetings and common exchanges provided a direct model, 80% of presentations.    STG 6 w     SPOKEN LANG COMP  Pt will demonstrate understanding of complex sentences and short paragraphs with 80% accuracy to y/n questions.. STG 4 w      Pt will follow 2 step commands with 4 components, 80% of presentations.    STG 6 w     SPOKEN LANG EXP  Provided a descriptive cue and a F6 pictures written words, pt will name the  described item, 80% of trials, min A  STG 4 w     Given simple picture cue pt will produce simple sentences of 3-4 words with simplified grammatical form and approximated content words to convey meaning, Min A, 80% of presentations.  STG 6 w     READING  Pt will match picture to word F4 90% of presentations.   STG 4 w     Pt will select picture/word for sentence completion, 80%   STG 6w     Pt will demonstrate reading comprehension of sentences to brief paragraph- answering multiple choice questions, 80% min A STG 8 w         FCM MOTOR SPEECH  L4: In simple structured conversation with familiar communication partners, the individual can produce simple words and phrases intelligibly.  The individual usually requires moderate cueing in order to produce simple sentences intelligibly, although accuracy may vary.         FCM: Spoken Language Expression   L4: The individual is successfully able to initiate communication using spoken language in simple, structured conversation in routine daily activities with familiar communication partners.  The individual usually requires moderate cueing, but is able to demonstrate use of simple sentences (ie, semantics, syntax, and morphology) and rarely uses complex sentences/messages.        FCM: Spoken Language Comprehension  L5: The individual is able to understand communication in structured conversations with both familiar and unfamiliar communication partners.  The individual occasionally requires minimal cueing to understand more complex sentences/messages.  The individual occasionally initiates the use of compensatory strategies when encountering difficulty.       FCM: Reading  L4: The individual reads words and phrases related to routine daily activities, and words that are less familiar, longer, and more complex.  The individual usually requires moderate cueing to read sentences of approximately 5 and 7 words in length.         Goals Short-Long Time Frame Result Comment    Will improve FCM in the following:  MOTOR SPEECH  Current:3-4  Goal:4-5, revised to 5-6  SPOKEN LANG COMP  Current:4  Goal: 5, revised to 6  SPOKEN LANGUAGE EXP:  Current:3  Goal:4, revised to 5 LTG 12 w RE: 2024   Motor Speech: 4  Spoken lang comp: 5  Spoken lang expression:4  Readin New POC- Will continue with current goal:  MOTOR SPEECH  Current:4  Goal:5-6  SPOKEN LANG COMP  Current:5  Goal:  6  SPOKEN LANG EXP:  Current:4  Goal: 5  READING:   Current:4  Goal: 5   Pt will improve Aphasia Severity Rating Scale from 2/5 to >/=3/5 LTG 12 w RE: 2024   2, Not met    MOTOR SPEECH:  Provided Auditory bombardment, picture stimulus, direct visual/aud model, pt will produce target words with /p,b,m,w,t,d,n,s,l/ in initial position on first trial, 80% of presentations.  STG 4-6 w RE: 2024   /p,b,m,/- met  /w,n,s,l- progressing  /t,d/- difficult Will revise goal for new POC   Fading cues from above goal: Pt will produce CVCV, VCV and CVC syllables 80% of presentations.  STG 6-8 w Continue   PN: 2023     SPOKEN LANG EXP  Given simple picture cue pt will produce simple sentences of 3-4 words with simplified grammatical form and approximated content words to convey meaning, Min A, 80% of presentations.  STG 6-8 Continue PN:2024  Unscrambled 2-5 words to describe picture 90%   Pt will produce simple automatics provided mod A:  1-20  A-J  MALLY   STG 6-8 RE: 2024  1-8- (I), 9-12- mod A  MALLY: improving 71%, DM  ABC: A-C (I)-A-J, 70% direct model      Pt will produce simple social responses, Mod A:(revised  (S)  Name    Address (parts) STG 4-6 RE: 2024  Full name- (I)  : Month/date (S)  Address:street, town- (S)    SPOKEN LANG COMP  The pt will demonstrate understanding of 2 noun in simple and present and progressive reversible sentences F4, 80% accy.   (Advanced language L1-2 Identify)  STG 4-6 w RE: 2024  L8:  F4: 3 nouns reversible, 85%  L9: 3 nouns, reversible, indirect object,  "100%  L10: 3 nouns non-reversible , passive: 100%  L11: 3 nouns, Reversible, Passive: 4/6 66%    3 nouns, reversible, passive   Provided body parts (on paper), single letters, digits in 10's/100's place value, pt will demonstrate  understanding of \"point to/touch/pick up___\" commands 85% of trials.  STG  RE: 2/8/2024  BDAE: F4:   Raw Score: 33/37  Percentiles:40-50%   Missed items  Body:  shoulder, letters: T,N   #: 257                             JERARDO Price         Current Participants as of 2/10/2024      Name Type Comments Contact Info    Liv Haywood MD Referring Provider  [phone]    Signature pending    Magnolia Lundy CCC-SLP Speech Language Pathologist      Electronically signed by JERARDO Cox at 2/10/2024 2056 EST    Ney Aparicio MD Consulting Physician  [phone]                                "

## 2024-02-08 NOTE — OP OT TREATMENT LOG
OT NEURO FLOW SHEET    EXERCISES  Initial Evaluation  Progress Note 1  Progress Note 2  Re-evaluation  Progress note 3  Progress note 4  Re-evaluation CURRENT SESSION  8/16/2023  9/14/2023  10/23/23  11/14/23  12/14/24  1/18/24  2/6/24 TIME         NEURO RE-ED  CPT 83329 TOTAL TIME FOR SESSION 23-37 Minutes   AAROM/AROM Engaged in right hand digit flexion and extension training.     Strengthening      Strength       Gross Motor Control Patient used right hand to catch fast rolling ball on table top and transfer to target container across the room with 1 lbs wrist weight donned and 80% accuracy for coordination.     Fine Motor Control Patient performed over top approach and spherical grasp of 1 kg medicine ball, lift 10 inches off table top with forearm pronated and drop ball with noted increase in digit extension when compared to AROM trials.     Sitting Jennifer/Balance     Standing Jennifer/Balance     Sensory re-education     Graded motor imagery      Retraining     THERE ACT  CPT 24994'  TOTAL TIME FOR SESSION 0-7 Minutes   Pain, Vitals, Meds, Etc. pain assessed/monitored      Assessment     HEP  12/15/23: Added towel grasp and fold exercise, right hand only; patient expressed good understanding.    11/2/23: Review pinch strengthening positions today for improved carry over    10/26/23: Added soft theraputty for right hand pinch (lateral pinch, tip pinch, 3 jaw patti); patient expressed good understnading    Discussed carry over with use of SF splint    9/28/23: Laterality exercise added to HEP; patient and caregiver expressed good understanding    Right hand stretching    9/21/2023:   -Educated on use of rice/bean sensory bin, massage, sensory re-education with deep pressure and textures. Issued tubigrip for RUE sensory re-education and advised to wear 1x per day. Continued to discuss edema management.     Functional Mobility     Transfers     THERE EX  CPT 78169 TOTAL TIME FOR SESSION Not performed   PROM      Activity Tolerance     SELF-CARE  CPT 23960 TOTAL TIME FOR SESSION 23-37 Minutes   Pt Education/Safety     ADL Training Patient engaged in meal preparation and eating simulated activities.   -Engaged in pouring task to monitor amount of water poured with right hand in dominant role using traditional sized cup. She initially over poured but progressed with repetitions.   -Towel ringing activity with left hand in primary role of rotation and with progression patient began rotation with right hand as well.   -Cutting task with built up handle on knife. Maximal to total assist to cut with right hand. Dominant role switched to left hand and patient able to cut with minimal assist but hand difficulty maintaining grasp of fork with right hand.     IADL Training     MODALITIES  CPT 86069 TOTAL TIME FOR SESSION Not performed   Ice/Heat     ATTENDED E-STIM  CPT 62784 TOTAL TIME FOR SESSION Not performed   Attended E-Stim     SPLINTING  CPT 94721 TOTAL TIME FOR SESSION Not performed   Fabrication/Check Out     Fit     Training     GROUP  CPT 64847 TOTAL TIME FOR SESSION Not performed             Normative Range for Female 75+ Years of Age       Right Hand   Left Hand      Initial Evaluation Progress Note 1 Norm Initial Evaluation Progress Note 1 Norm    Strength 0 lbs 0 lbs 42.6 lbs 30 lbs 30 lbs 37.6 lbs   Lateral Pinch N/T 2 lbs 12.6 lbs N/T 14 lbs 11.4 lbs   Three Jaw Yonatan N/T 2 lbs (assist for position) 12 lbs N/T 11 lbs 11.5 lbs   Tip Pinch N/T N/T 9.6 lbs N/T 9 lbs 9.3 lbs   Box and Block 4 blocks 22 blocks 65 blocks 50 blocks 56 blocks 63.6 blocks   Nine Hole Peg (71+ age) Unable to complete Unable to complete 22.49 seconds N/T 24.55 seconds 24.11 seconds

## 2024-02-08 NOTE — OP SLP TREATMENT LOG
Today's Treatment:     BDAE  Eval: 8/18/2023     RE: 11/14/2023    RE: 2/8/2024   Aphasia Severity Rating Scale 1/5  2/5 2/5   CONVERSATIONAL and EXPOSITORY SPEECH        Simple Social Responses Raw Score:   Percentiles:  Raw Score: 6/7  Percentiles: 50%tile Raw Score: 6/7  Percentiles: 50%tile  Missing street number only   Complexity Index  Raw Score:    Ratio:   Percentiles:       Narrative Discourse  Short form Raw Score:   Ratio:   Percentiles:       AUDITORY COMPREHEN       Basic Word Discrimination           Raw Score: 29/37  Percentiles:20%           Raw Score: 33/37  Percentiles:40-50% Body:  shoulder, letters: T,N   #: 257    Raw Score: 33/37  Percentiles:40-50%   Commands  Standard Form  8/25/2023 Raw Score: 3/15  Percentiles: 0-10% Raw Score: 7/15  Percentiles: 10-20% Raw Score: 10/15  Percentiles: 20%      Complex Ideational Material  Short Form  8/25/2023    Raw Score: 0/4  Percentiles: 0%  Short Form  8/25/2023     Raw Score: 5/6  Percentiles: 70%  Short Form     Raw Score: 7/12  Percentiles: 40%  Long form   ARTICULATION  Agility                 ORAL EXPRESSION  Recitation, Yolis, Rhythm                 Automatized Sequences  Standard Form    Recitation: 0  Percentiles: 30%tile  Yolis: 1  Percentiles: 30%tile  Rhythm: 1  Percentiles: 60%tile     Raw Score: 0/8  Percentiles: 0%tile     Recitation: 0  Percentiles: 30%tile  Yolis: 2  Percentiles: 100%tile  Rhythm: 1  Percentiles: 60%tile     Short Form  Raw Score: 2/4  Percentiles: 20%tile   Recitation: 0  Percentiles: 30%tile  Yolis: 2  Percentiles: 100%tile  Rhythm: 2  Percentiles: 60%tile     Short Form  Raw Score: 2/4  Percentiles: 20%tile   REPETITION  Words  Standard Form     Repetition of Nonsense words     Sentences  Standard Form    Raw Score: 0  Percentiles:0 %tile     Raw Score:  Percentiles:      Raw Score:  Percentiles:     Raw Score: 3/10  Percentiles:10 %tile                      NAMING  Responsive Naming  Standard Form     [place]  Naming Test  Standard Form    Raw Score:   Percentiles:        Short Form   Raw Score: 0/5  Percentiles:10% tile            (based on syllable and phoneme approximations)    Raw Score: 4  Stim cue: 0/0  PC:   MC: 9/10  Percentiles: 30  Mean: 13.9, +/-3   READING  Match case and scripts     Number matching     Picture-Word Matching     Oral Word Reading     Oral Sentence Reading     Oral Sentence Comp     Sentence/Paragraph     Comprehension    Raw Score: 7  Percentiles: 40%tile  Raw Score:   Percentiles:   Raw Score: 10  Percentiles: 100%tile  Raw Score:   Percentiles:   Raw Score:   Percentiles:   Raw Score:   Percentiles:   Raw Score:   Percentiles:   Raw Score:   Percentiles:     Raw Score: 8  Percentiles: 100%tile  Raw Score: 12  Percentiles: 10  Raw Score: 7  Percentiles: 20%tile       Raw Score:   Percentiles: 10-20%  Raw Score: 9  Percentiles: 60%tile    WRITING  Form     Letter Choice     Motor Facility     Primer Words     Regular Phonics     Common Irregular Words     Written Picture Naming     Narrative Writing    Raw Score:   Percentiles:   Raw Score:   Percentiles:   Raw Score:   Percentiles:   Raw Score:   Percentiles:   Raw Score:   Percentiles:   Raw Score:   Percentiles:   Raw Score:   Percentiles:   Raw Score:   Percentiles:            IE: 2023 RE: 2023   Rating Scale Profile of Speech Characteristics 1-7 scale 1-7 scale   Articulatory Agility-phoneme and syllable level  2-3   Phrase Length: longest word runs  37   Grammatical Form: Variety and use of morphemes  3   Melodic Line (Prosody)  6-7   Paraphasia in Running Speech   27   Word Finding Relative to Fluency  4/7   Sentence Repetition  1   Auditory Comprehension  2-3/7      Aphasia Severity Ratin/5-   Conversations about familiar subjects is possible with help from the listener.  There are frequent failures to convey the idea, but the patient shares the burden of communication.

## 2024-02-09 NOTE — PROGRESS NOTES
Occupational Therapy Visit    OT DAILY NOTE FOR OUTPATIENT THERAPY    Patient: Jennifer Sams   MRN: [id number]  : 1945 79 y.o.  Referring Physician: Liv Haywood, Debra  Date of Visit: 2024      Certification Dates: 24 through 24    Diagnosis:   1. Acute ischemic left MCA stroke (CMS/HCC)    2. Lack of coordination    3. Muscle weakness (generalized)    4. Hemiplegia, dominant side (CMS/HCC)        Chief Complaints:   Brain Injury    Precautions:       TODAY'S VISIT    Time In Session:  Start Time: 1701  Stop Time: 1800  Time Calculation (min): 59 min   History/Vitals/Pain/Encounter Info - 24 1839        Injury History/Precautions/Daily Required Info    Document Type daily treatment     Primary Therapist Barry Rosas OTR/L     Chief Complaint/Reason for Visit  Brain Injury     Onset of Illness/Injury or Date of Surgery 23     Referring Physician Dr. Haywood     History of present illness/functional impairment The patient is a 78-year-old  female with a history of AAA, heart block s/p cardiac pacemaker, glaucoma, hyperlipidemia, coronary artery disease, NSTEMI, who presented to [facility] on  after she has not been seen by her friends for a few days.  Patient lives alone and when she was found by EMS she was confused and combative.  In the emergency department she was aphasic with right-sided weakness and hypertensive to the 190 systolic.  CT scan of the head revealed an acute infarct in left MCA with mild bleeding.  Echo showed an EF of 43% and a bubble study was negative.  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  Later this was transitioned to apixaban.  She was continued on statin for secondary prevention.  She required virtual shivani while in the hospital but this was not effective.  She was evaluated by speech therapy and cleared for a soft and  bite-size diet with moderately thick liquids.'     Patient/Family/Caregiver Comments/Observations Patient arrives with caregiver and is agreeable to treatment.     Patient reported fall since last visit No        Pain Assessment    Currently in pain No/Denies         Services    Do You Speak a Language Other Than English at Home? no                Daily Treatment Assessment and Plan - 02/08/24 6477        Daily Treatment Assessment and Plan    Progress toward goals Progressing     Daily Outcome Summary Patient with notable improvement in volitional use of RUE and increased tolerance for manual assistance for coordination activities. Engaged in fine motor training with a focus on meal preparation and cutting activity. She benefited from built up handle and then transition to using left hand in dominant role for cutting. She exhibits improved RUE motor control with repetition during all tasks.     Plan and Recommendations task-oriented practice, fine and gross motor, functional tasks with right hand                     OBJECTIVE DATA TAKEN TODAY    None Taken    Today's Treatment:      OT NEURO FLOW SHEET    EXERCISES  Initial Evaluation  Progress Note 1  Progress Note 2  Re-evaluation  Progress note 3  Progress note 4  Re-evaluation CURRENT SESSION  8/16/2023  9/14/2023  10/23/23  11/14/23  12/14/24  1/18/24  2/6/24 TIME         NEURO RE-ED  CPT 02542 TOTAL TIME FOR SESSION 23-37 Minutes   AAROM/AROM Engaged in right hand digit flexion and extension training.     Strengthening      Strength       Gross Motor Control Patient used right hand to catch fast rolling ball on table top and transfer to target container across the room with 1 lbs wrist weight donned and 80% accuracy for coordination.     Fine Motor Control Patient performed over top approach and spherical grasp of 1 kg medicine ball, lift 10 inches off table top with forearm pronated and drop ball with noted increase in digit extension when  compared to AROM trials.     Sitting Jennifer/Balance     Standing Jennifer/Balance     Sensory re-education     Graded motor imagery      Retraining     THERE ACT  CPT 21635'  TOTAL TIME FOR SESSION 0-7 Minutes   Pain, Vitals, Meds, Etc. pain assessed/monitored      Assessment     HEP  12/15/23: Added towel grasp and fold exercise, right hand only; patient expressed good understanding.    11/2/23: Review pinch strengthening positions today for improved carry over    10/26/23: Added soft theraputty for right hand pinch (lateral pinch, tip pinch, 3 jaw patti); patient expressed good understnading    Discussed carry over with use of SF splint    9/28/23: Laterality exercise added to HEP; patient and caregiver expressed good understanding    Right hand stretching    9/21/2023:   -Educated on use of rice/bean sensory bin, massage, sensory re-education with deep pressure and textures. Issued tubigrip for RUE sensory re-education and advised to wear 1x per day. Continued to discuss edema management.     Functional Mobility     Transfers     THERE EX  CPT 31769 TOTAL TIME FOR SESSION Not performed   PROM     Activity Tolerance     SELF-CARE  CPT 86103 TOTAL TIME FOR SESSION 23-37 Minutes   Pt Education/Safety     ADL Training Patient engaged in meal preparation and eating simulated activities.   -Engaged in pouring task to monitor amount of water poured with right hand in dominant role using traditional sized cup. She initially over poured but progressed with repetitions.   -Towel ringing activity with left hand in primary role of rotation and with progression patient began rotation with right hand as well.   -Cutting task with built up handle on knife. Maximal to total assist to cut with right hand. Dominant role switched to left hand and patient able to cut with minimal assist but hand difficulty maintaining grasp of fork with right hand.     IADL Training     MODALITIES  CPT 60912 TOTAL TIME FOR SESSION Not performed   Ice/Heat      ATTENDED E-STIM  CPT 56663 TOTAL TIME FOR SESSION Not performed   Attended E-Stim     SPLINTING  CPT 20136 TOTAL TIME FOR SESSION Not performed   Fabrication/Check Out     Fit     Training     GROUP  CPT 39981 TOTAL TIME FOR SESSION Not performed             Normative Range for Female 75+ Years of Age       Right Hand   Left Hand      Initial Evaluation Progress Note 1 Norm Initial Evaluation Progress Note 1 Norm    Strength 0 lbs 0 lbs 42.6 lbs 30 lbs 30 lbs 37.6 lbs   Lateral Pinch N/T 2 lbs 12.6 lbs N/T 14 lbs 11.4 lbs   Three Jaw Yonatan N/T 2 lbs (assist for position) 12 lbs N/T 11 lbs 11.5 lbs   Tip Pinch N/T N/T 9.6 lbs N/T 9 lbs 9.3 lbs   Box and Block 4 blocks 22 blocks 65 blocks 50 blocks 56 blocks 63.6 blocks   Nine Hole Peg (71+ age) Unable to complete Unable to complete 22.49 seconds N/T 24.55 seconds 24.11 seconds

## 2024-02-11 NOTE — PROGRESS NOTES
Speech-Language Pathology Progress Note    [facility] Fax: [phone]    SLP RE-EVALUATION FOR OUTPATIENT THERAPY    Patient: Jennifer Sams   MRN: [id number]  : 1945 79 y.o.    Referring Physician: Liv Haywood, * Dr. Aparicio  Date of Visit: 2024    New Certification Dates: 02/15/24 through 24    Recommended Frequency & Duration:  2 times/week for up to 3 months   PN: 3/15/2024    Diagnosis:   1. Cerebrovascular accident (CVA), unspecified mechanism (CMS/HCC)    2. Acute ischemic left MCA stroke (CMS/HCC)        Chief Complaints:   Chief Complaint   Patient presents with   • Speech Problem     Language- expressive and receptive communication.        Precautions:   Precautions Comments: aphasia, apraxia, pacemaker    TODAY'S VISIT:    Session Time:  Start Time: 1600  Stop Time: 1700  Time Calculation (min): 60 min   General Information - 24 1702        Session Details    Document Type re-evaluation     Mode of Treatment individual therapy        General Information    Onset of Illness/Injury or Date of Surgery 23     Referring Physician Dr. Aparicio     History of present illness/functional impairment  is a 79 y.o. female who presented to [facility] on  after she was not seen by her friends for a few days.  Ms. Sams lives alone and when she was found by EMS she was confused and combative.  In the emergency department she was aphasic with right-sided weakness and hypertensive to the 190 systolic.  CT scan of the head revealed an acute infarct in left MCA with mild bleeding.  Echo showed an EF of 43% and a bubble study was negative.  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 Pt transferred to  [facility] on 2023 Principal problem  Acute ischemic left MCA stroke (CMS/HCC). PMH AAA, heart block s/p cardiac pacemaker, glaucoma, hyperlipidemia, coronary artery disease, She was evaluated by speech therapy and cleared  for a soft and bite-size diet with moderately thick liquids.  7/2/2023:  Diet SB6 with mildly thick liquids (MT2)  7/6/2023: Upgraded to EC7 with thin liquids.  Pt discharged home with 24 hr care and home health services recieving speech therapy for motor speech and aphasia.  Transferred to outpatient and evaluated at [facility] on 8/18/2024.  Ms. Sams presents for her second re-eval at [facility].     Precautions Comments aphasia, apraxia, pacemaker     Limitations/Impairments safety/cognitive     Interventions communication.  Explore written, nonverbal and low tech AAC to aid in communication.     Patient/Family/Caregiver Comments/Observations Pt and POA requesting continuation of POC                Daily Falls Screen - 02/08/24 1948        Daily Falls Assessment    Patient reported fall since last visit No                Pain and Vitals - 02/08/24 1947        Pain Assessment    Currently in pain No/Denies                SLP - 02/08/24 1702        Speech Therapy    Speech Therapy Specialty Traditional Neuro Program ST        SLP Frequency and Duration    Frequency of treatment 2 times/week     Speech Duration 3 months     SLP Custom Frequency and Duration PN: 3/15/2024     SLP Cert From 02/15/24     SLP Cert To 05/08/24     Date SLP POC was sent to provider 02/08/24     Signed SLP Plan of Care received?  No                Assessment - 02/08/24 1947        Assessment    Plan of Care reviewed and patient/family in agreement Yes     Comments Pt and POA are in agreement to extend plan of care to continue with goals directed toward motor speech, expressive and receptive language and reading comprehension.     Rehab Potential/Prognosis (SLP) good, to achieve stated therapy goals     Problem List Impaired communication     Clinical Assessment  presents for re-evaluation of present levels of speech and language communication.   demonstrates improved receptive language skills for following commands, and  paragraph comprehension.  Ms. Sams demonstrates improved word retrieval and articulatory accuracy in words and phrases.  Reading skills continue to improve and have been shown to support 's motor speech.  It is recommmended that plan of care is extended with revised goals to progress skills in language expression and comprehension, reading comprehension and motor speech.  Consideration of an AAC evaluation will be further explored.     Plan and Recommendations Initiate new  POC     Planned Services [facility] [id number] Speech Lang Voice Comm and/or Auditory Proc (Treatment of speech, language, voice, communication and/or hearing processing disorder)                 OBJECTIVE MEASUREMENTS/DATA:    OM: FCM/Voice/Neuro    Outcome Measures - 02/08/24 1702        Functional Communication Measures    FCM: Motor Speech 4-->Level 4     FCM: Reading 4-->Level 4     FCM: Spoken Language, Comprehension 5-->Level 5     FCM: Spoken Language, Expression 4-->Level 4                 Outcome Measures        8/16/2023    17:42 8/25/2023    16:57 11/14/2023    10:15 12/14/2023    17:09 2/8/2024    17:02   SLP Outcome Measures   FCM: Motor Speech 2-->Level 2       Goal L4  3-->Level 3       approaching 4 4-->Level 4 4-->Level 4   FCM: Reading 3-->Level 3       Goal: maintain 3, indirect therapy, improve to 4.    4-->Level 4   FCM: Spoken Language, Comprehension --        TBD, suspect L2, goal, indirect therapy-4 2-->Level 2 4-->Level 4 5-->Level 5 5-->Level 5   FCM: Spoken Language, Expression 2-->Level 2  3-->Level 3       approaching 4 4-->Level 4 4-->Level 4       Today's Treatment:     Today's Treatment:     BDAE  Eval: 8/18/2023     RE: 11/14/2023    RE: 2/8/2024   Aphasia Severity Rating Scale 1/5  2/5 2/5   CONVERSATIONAL and EXPOSITORY SPEECH        Simple Social Responses Raw Score:   Percentiles:  Raw Score: 6/7  Percentiles: 50%tile Raw Score: 6/7  Percentiles: 50%tile  Missing street number only   Complexity Index  Raw Score:     Ratio:   Percentiles:       Narrative Discourse  Short form Raw Score:   Ratio:   Percentiles:       AUDITORY COMPREHEN       Basic Word Discrimination           Raw Score: 29/37  Percentiles:20%           Raw Score: 33/37  Percentiles:40-50% Body:  shoulder, letters: T,N   #: 257    Raw Score: 33/37  Percentiles:40-50%   Commands  Standard Form  8/25/2023 Raw Score: 3/15  Percentiles: 0-10% Raw Score: 7/15  Percentiles: 10-20% Raw Score: 10/15  Percentiles: 20%      Complex Ideational Material  Short Form  8/25/2023    Raw Score: 0/4  Percentiles: 0%  Short Form  8/25/2023     Raw Score: 5/6  Percentiles: 70%  Short Form     Raw Score: 7/12  Percentiles: 40%  Long form   ARTICULATION  Agility                 ORAL EXPRESSION  Recitation, Yolis, Rhythm                 Automatized Sequences  Standard Form    Recitation: 0  Percentiles: 30%tile  Yolis: 1  Percentiles: 30%tile  Rhythm: 1  Percentiles: 60%tile     Raw Score: 0/8  Percentiles: 0%tile     Recitation: 0  Percentiles: 30%tile  Yolis: 2  Percentiles: 100%tile  Rhythm: 1  Percentiles: 60%tile     Short Form  Raw Score: 2/4  Percentiles: 20%tile   Recitation: 0  Percentiles: 30%tile  Yolis: 2  Percentiles: 100%tile  Rhythm: 2  Percentiles: 60%tile     Short Form  Raw Score: 2/4  Percentiles: 20%tile   REPETITION  Words  Standard Form     Repetition of Nonsense words     Sentences  Standard Form    Raw Score: 0  Percentiles:0 %tile     Raw Score:  Percentiles:      Raw Score:  Percentiles:     Raw Score: 3/10  Percentiles:10 %tile                      NAMING  Responsive Naming  Standard Form     De Beque Naming Test  Standard Form    Raw Score:   Percentiles:        Short Form   Raw Score: 0/5  Percentiles:10% tile            (based on syllable and phoneme approximations)    Raw Score: 4  Stim cue: 0/0  PC: 2/11  MC: 9/10  Percentiles: 30  Mean: 13.9, +/-3   READING  Match case and scripts     Number matching     Picture-Word Matching     Oral Word  Reading     Oral Sentence Reading     Oral Sentence Comp     Sentence/Paragraph     Comprehension    Raw Score: 7  Percentiles: 40%tile  Raw Score:   Percentiles:   Raw Score: 10  Percentiles: 100%tile  Raw Score:   Percentiles:   Raw Score:   Percentiles:   Raw Score:   Percentiles:   Raw Score:   Percentiles:   Raw Score:   Percentiles:     Raw Score: 8  Percentiles: 100%tile  Raw Score: 8/12  Percentiles: 10  Raw Score: 7  Percentiles: 20%tile       Raw Score: 9/12  Percentiles: 10-20%  Raw Score: 9  Percentiles: 60%tile    WRITING  Form     Letter Choice     Motor Facility     Primer Words     Regular Phonics     Common Irregular Words     Written Picture Naming     Narrative Writing    Raw Score:   Percentiles:   Raw Score:   Percentiles:   Raw Score:   Percentiles:   Raw Score:   Percentiles:   Raw Score:   Percentiles:   Raw Score:   Percentiles:   Raw Score:   Percentiles:   Raw Score:   Percentiles:            IE: 2023 RE: 2023   Rating Scale Profile of Speech Characteristics 1-7 scale 1-7 scale   Articulatory Agility-phoneme and syllable level  2-3/7   Phrase Length: longest word runs  37   Grammatical Form: Variety and use of morphemes  3/7   Melodic Line (Prosody)  6-7/7   Paraphasia in Running Speech   2/7   Word Finding Relative to Fluency  4/7   Sentence Repetition  1   Auditory Comprehension  2-3/7      Aphasia Severity Ratin/5-   Conversations about familiar subjects is possible with help from the listener.  There are frequent failures to convey the idea, but the patient shares the burden of communication.          Goals:     Goals Addressed                 This Visit's Progress    • SLP Motor Goals          NEW /Revised RE=Evaluation: 2024      Goals Short-Long Time Frame Result Comment   MOTOR SPEECH  Current:4  Goal:5-6  SPOKEN LANG COMP  Current:5  Goal:  6  SPOKEN LANG EXP:  Current:4  Goal: 5  READING:   Current:4  Goal: 5 LTG 12w     Pt will  improve Aphasia Severity Rating Scale from 2/5 to >/=3/5 LTG 12 w     MOTOR SPEECH  Pt will produce 1-3 syllable words   with /p,b,m,w,t,d,n,s,l/ in initial position 80% of presentations.    STG 6 w     Pt will repeat familiar greetings and common exchanges provided a direct model, 80% of presentations.    STG 6 w     SPOKEN LANG COMP  Pt will demonstrate understanding of complex sentences and short paragraphs with 80% accuracy to y/n questions.. STG 4 w      Pt will follow 2 step commands with 4 components, 80% of presentations.    STG 6 w     SPOKEN LANG EXP  Provided a descriptive cue and a F6 pictures written words, pt will name the described item, 80% of trials, min A  STG 4 w     Given simple picture cue pt will produce simple sentences of 3-4 words with simplified grammatical form and approximated content words to convey meaning, Min A, 80% of presentations.  STG 6 w     READING  Pt will match picture to word F4 90% of presentations.   STG 4 w     Pt will select picture/word for sentence completion, 80%   STG 6w     Pt will demonstrate reading comprehension of sentences to brief paragraph- answering multiple choice questions, 80% min A STG 8 w         FCM MOTOR SPEECH  L4: In simple structured conversation with familiar communication partners, the individual can produce simple words and phrases intelligibly.  The individual usually requires moderate cueing in order to produce simple sentences intelligibly, although accuracy may vary.         FCM: Spoken Language Expression   L4: The individual is successfully able to initiate communication using spoken language in simple, structured conversation in routine daily activities with familiar communication partners.  The individual usually requires moderate cueing, but is able to demonstrate use of simple sentences (ie, semantics, syntax, and morphology) and rarely uses complex sentences/messages.        FCM: Spoken Language Comprehension  L5: The individual is  able to understand communication in structured conversations with both familiar and unfamiliar communication partners.  The individual occasionally requires minimal cueing to understand more complex sentences/messages.  The individual occasionally initiates the use of compensatory strategies when encountering difficulty.       FCM: Reading  L4: The individual reads words and phrases related to routine daily activities, and words that are less familiar, longer, and more complex.  The individual usually requires moderate cueing to read sentences of approximately 5 and 7 words in length.         Goals Short-Long Time Frame Result Comment   Will improve FCM in the following:  MOTOR SPEECH  Current:3-4  Goal:4-5, revised to 5-6  SPOKEN LANG COMP  Current:4  Goal: 5, revised to 6  SPOKEN LANGUAGE EXP:  Current:3  Goal:4, revised to 5 LTG 12 w RE: 2024   Motor Speech: 4  Spoken lang comp: 5  Spoken lang expression:4  Readin New POC- Will continue with current goal:  MOTOR SPEECH  Current:4  Goal:5-6  SPOKEN LANG COMP  Current:5  Goal:  6  SPOKEN LANG EXP:  Current:4  Goal: 5  READING:   Current:4  Goal: 5   Pt will improve Aphasia Severity Rating Scale from 2/5 to >/=3/5 LTG 12 w RE: 2024   2/5, Not met    MOTOR SPEECH:  Provided Auditory bombardment, picture stimulus, direct visual/aud model, pt will produce target words with /p,b,m,w,t,d,n,s,l/ in initial position on first trial, 80% of presentations.  STG 4-6 w RE: 2024   /p,b,m,/- met  /w,n,s,l- progressing  /t,d/- difficult Will revise goal for new POC   Fading cues from above goal: Pt will produce CVCV, VCV and CVC syllables 80% of presentations.  STG 6-8 w Continue   PN: 2023     SPOKEN LANG EXP  Given simple picture cue pt will produce simple sentences of 3-4 words with simplified grammatical form and approximated content words to convey meaning, Min A, 80% of presentations.  STG 6-8 Continue PN:2024  Unscrambled 2-5 words to describe  "picture 90%   Pt will produce simple automatics provided mod A:  1-20  A-J  MALLY   STG 6-8 RE: 2024  1-8- (I), 9-12- mod A  MALYL: improving 71%, DM  ABC: A-C (I)-A-J, 70% direct model      Pt will produce simple social responses, Mod A:(revised  (S)  Name    Address (parts) STG 4-6 RE: 2024  Full name- (I)  : Month/date (S)  Address:street, town- (S)    SPOKEN LANG COMP  The pt will demonstrate understanding of 2 noun in simple and present and progressive reversible sentences F4, 80% accy.   (Advanced language L1-2 Identify)  STG 4-6 w RE: 2024  L8:  F4: 3 nouns reversible, 85%  L9: 3 nouns, reversible, indirect object, 100%  L10: 3 nouns non-reversible , passive: 100%  L11: 3 nouns, Reversible, Passive: 4/6 66%    3 nouns, reversible, passive   Provided body parts (on paper), single letters, digits in 10's/100's place value, pt will demonstrate  understanding of \"point to/touch/pick up___\" commands 85% of trials.  STG  RE: 2024  BDAE: F4:   Raw Score: 33/37  Percentiles:40-50%   Missed items  Body:  shoulder, letters: T,N   #: 257                           Magnolia Lundy, CCC-SLP  "

## 2024-02-12 ENCOUNTER — OFFICE VISIT (OUTPATIENT)
Dept: NEUROLOGY | Facility: CLINIC | Age: 79
End: 2024-02-12
Payer: MEDICARE

## 2024-02-12 VITALS
BODY MASS INDEX: 17.13 KG/M2 | WEIGHT: 113 LBS | RESPIRATION RATE: 14 BRPM | DIASTOLIC BLOOD PRESSURE: 80 MMHG | SYSTOLIC BLOOD PRESSURE: 128 MMHG | TEMPERATURE: 97.2 F | HEIGHT: 68 IN | OXYGEN SATURATION: 99 % | HEART RATE: 66 BPM

## 2024-02-12 DIAGNOSIS — I63.9 CARDIOEMBOLIC STROKE (CMS/HCC): Primary | ICD-10-CM

## 2024-02-12 PROCEDURE — G8754 DIAS BP LESS 90: HCPCS | Performed by: INTERNAL MEDICINE

## 2024-02-12 PROCEDURE — G8752 SYS BP LESS 140: HCPCS | Performed by: INTERNAL MEDICINE

## 2024-02-12 PROCEDURE — 99214 OFFICE O/P EST MOD 30 MIN: CPT | Performed by: INTERNAL MEDICINE

## 2024-02-12 NOTE — PROGRESS NOTES
Neurology Outpatient Consult    PATIENT NAME:  Jennifer Sams  MRN:  [id number]  DATE OF SERVICE:  2/19/2024    REASON FOR CONSULTATION/CHIEF COMPLAINT: Stroke Follow up     HISTORY OF PRESENT ILLNESS: This is a 79 y.o. right-handed female with history of abdominal aortic aneurysm, recent left MCA ischemic stroke June 2023 comes to the outpatient Vascular Neurology clinic for a follow-up.    Patient is accompanied to the visit by her friend.      Stroke Hx:  Patient was seen in the hospital in June 2023 for acute right hemiparesis and speech difficulty.  The patient was last seen normal 1 to 2 days prior to arrival and was found on the floor.  Imaging revealed a large left MCA completed infarct with mass effect.  Patient did not require any surgical intervention.  Further work-up revealed apical thrombus and the patient was not on anticoagulation.    Clinic visit  8/28/2023  Continues to have moderate to severe aphasia but according to friend, has made significant improvement in motor strength.  Patient is able to ambulate without assistant  Continues to have right upper extremity weakness, mild right lower extremity weakness and aphasia.  Getting outpatient speech and physical therapy.    2/12/2024  Continues to have significant aphasia.  Friends report improvement but the patient appears frustrated with continued aphasia.  Follows up with outpatient speech therapy.  No new symptoms concerning for recurrent ischemic stroke.    REVIEW OF SYSTEMS:  Except as mentioned in the HPI, review of systems is essentially negative for the 10 major systems.     PAST MEDICAL HISTORY:    Past Medical History:   Diagnosis Date   • AAA (abdominal aortic aneurysm) (CMS/[facility])    • Arthritis    • Elevated blood pressure reading without diagnosis of hypertension 04/19/2019   • Epistaxis 01/06/2020   • GERD (gastroesophageal reflux disease) 04/19/2019   • Glaucoma 04/19/2019   • Heart murmur    • Hiatal hernia    • History of hip  replacement, total, right 04/19/2019    Right hip 9/ 2016 and revision 2017    • History of rheumatic fever as a child 04/19/2019   • Hypercholesterolemia 04/19/2019   • Ischemic cardiomyopathy 05/09/2019   • Kidney cysts    • MI (myocardial infarction) (CMS/[facility])    • Osteoarthritis of multiple joints 04/19/2019   • Osteoporosis    • Pacemaker 12/09/2019   • Raynaud's disease 04/19/2019   • RSD (reflex sympathetic dystrophy) 04/19/2019    Of left foot   • SOB (shortness of breath) 12/10/2019   • Status post insertion of drug eluting coronary artery stent 05/09/2019   • Stroke (CMS/[facility])        PAST SURGICAL HISTORY:    Past Surgical History:   Procedure Laterality Date   • CHOLECYSTECTOMY OPEN     • CORONARY ANGIOPLASTY WITH STENT PLACEMENT  04/29/2019    of LAD   • CORONARY ANGIOPLASTY WITH STENT PLACEMENT  04/30/2019    of RCA   • DILATION AND CURETTAGE OF UTERUS     • EYE SURGERY      RIGHT CATARACT   • FOOT SURGERY Left    • JOINT REPLACEMENT Right 09/2016    total hip   • REVISION TOTAL HIP ARTHROPLASTY Right 2017   • TONSILLECTOMY         ALLERGIES:    Allergies   Allergen Reactions   • Ace Inhibitors Other (see comments)     cough   • Atorvastatin      Nausea and can'trecall   • Brilinta [Ticagrelor]      SOB   • Codeine      Nausea and Vomiting   • Dilaudid [Hydromorphone]      Nausea & vomiting   • Morphine Sulfate Nausea Only and GI intolerance   • Onion      Severe abdominal pain   • Oxycodone      Nausea & vomiting, dizziness       FAMILY HISTORY:    Family History   Problem Relation Age of Onset   • Congenital heart disease Biological Mother         noted at autopsy   • Pulmonary fibrosis Biological Father    • Heart attack Paternal Grandfather        SOCIAL HISTORY:    Social History     Tobacco Use   • Smoking status: Never   • Smokeless tobacco: Never   Vaping Use   • Vaping Use: Never used   Substance Use Topics   • Alcohol use: Not Currently   • Drug use: No        PHYSICAL EXAMS:      Vitals:     "02/12/24 1529   BP: 128/80   Pulse: 66   Resp: 14   Temp: 36.2 °C (97.2 °F)   TempSrc: Temporal   SpO2: 99%   Weight: 51.3 kg (113 lb)   Height: 1.73 m (5' 8.11\")       General appearance: NAD, conversant  Eyes: anicteric sclerae, moist conjunctivae; no lid-lag;   HENT: Atraumatic; oropharynx clear with moist mucous membranes and no mucosal ulcerations; normal hard and soft palate  Neck: Trachea midline; , supple.  Lungs: CTA, with normal respiratory effort and no intercostal retractions  CV: RRR, no MRGs    Neurological Examination  Patient is awake and alert  Severe aphasia: Frequent paraphasic errors and speech perseveration. unable to name  Moderate dysarthria    Cranial nerves :  Pupils are equal, round, and reactive to light and accommodation bilaterally.    Eye movements are full without nystagmus.   Visual fields are full.    Facial sensation is intact bilaterally    Facial strength right lower facial droop  The palate was midline.    Hearing is intact.     Shoulder shrug is normal.  The tongue is midline.       Motor:   Right upper extremity: Unable to extend at the wrist, hand  1/5.  Rest of right upper extremity 3/5  Right lower extremity 4-/5  Left upper and lower extremities 5/5    Sensory exam:  Sensation is intact to light touch on the upper and lower extremities.     Cerebellar:  Finger to Nose intact on the left side.  Unable to perform on the right side due to weakness    NIH Stroke Scale    Interval: 2/12/2024  Time:   Person Administering Scale: Ney Aparicio     1a  Level of consciousness: 0=alert; keenly responsive   1b. LOC questions:  2=Performs neither task correctly   1c. LOC commands: 0=Performs both tasks correctly   2.  Best Gaze: 0=normal   3. Visual: 0=No visual loss   4. Facial Palsy: 2=Partial paralysis (total or near total paralysis of the lower face)   5a.  Motor left arm: 0=No drift, limb holds 90 (or 45) degrees for full 10 seconds   5b.  Motor right arm: 2=Some effort " against gravity, limb cannot get to or maintain (if cured) 90 (or 45) degrees, drifts down to bed, but has some effort against gravity   6a. motor left le=No drift, limb holds 90 (or 45) degrees for full 10 seconds   6b  Motor right le=Drift, limb holds 90 (or 45) degrees but drifts down before full 10 seconds: does not hit bed   7. Limb Ataxia: 0=Absent   8.  Sensory: 0=Normal; no sensory loss   9. Best Language:  2=Moderate aphasia   10. Dysarthria: 1=Mild to moderate, patient slurs at least some words and at worst, can be understood with some difficulty   11. Extinction and Inattention: 0=No abnormality    Total:   10       PERTINENT DIAGNOSTIC TESTS:      IMAGING:  I have personally reviewed the images for the studies listed below.      CTA head and neck 2023  IMPRESSION:  1. Atherosclerosis at the carotid bifurcations with mild luminal narrowing  measuring approximately 25% on the right and 20% on the left using NASCET  criteria. Patent cervical carotid and vertebral arteries.  2. Termination of flow in an M3 branch of the left middle cerebral artery.  3. Intracranial atherosclerosis with significant narrowing involving the left V4  segment after the takeoff of the posterior inferior cerebellar artery. Mild  irregularity of the basilar artery.  4. Completed infarct in the posterior aspect of the left MCA territory. No  evidence of ischemic penumbra.  rCBF <30% : 0 cc  TMax > 6s: 2 cc  Mismatch volume: 2 cc  Mismatch Ratio: NA  5. Additional chronic and incidental findings, as above.     CT head w/o contrast 2023  IMPRESSION: Acute/subacute left MCA distribution infarct with 3 mm midline  shift.. No definite hemorrhage    LDL 88  HbA1c 5.4     I have personally reviewed the above imaging, procedure and lab studies.     OTHER RESULTS:  TTE 2023  • Left ventricle with normal dimensions and regional contraction abnormalities consistent with CAD in the LAD distribution: Localized apical  infarction with aneurysm formation.  Paradoxical septal movement is consistent with ventricular pacing.  There is moderate left ventricular systolic and mild diastolic dysfunction.  LVEF 43%  • Left ventricular apical thrombus  • Left atrial pressure 14 mmHg  • There is a visual suggestion of mild aortic stenosis. (Doppler assessment of the aortic valve is suboptimal)  • Mild mitral regurgitation  • Normal right ventricle dimensions and systolic function: TAPSE 1.84 cm  • Mild tricuspid regurgitation  • PASP 34 mmHg  • Normal left atrial volume index  • No pericardial effusion or vegetations  • AS-V pacing  • Technically difficult study: No parasternal imaging obtained  • This study was performed with Definity contrast to optimize evaluation of regional and global left ventricular function  • No significant change from 3/28/2023 except for current demonstration of left ventricular apical thrombus           ASSESSMENT:  78 y.o. right-handed female with history of abdominal aortic aneurysm, left MCA ischemic stroke June 2023 comes to the outpatient Vascular Neurology clinic for a follow-up.    Patient was admitted to hospital in June 2023 for right hemiparesis, found to have left ventricular apical thrombus and is on anticoagulation.       NIHSS 10  mRS 2        RECOMMENDATIONS:      Etiology of stroke  cardioembolic  Continue apixaban 5 mg twice daily  Continue rosuvastatin 20 mg daily  Goal blood pressure normotension,Goal LDL < 70 and normoglycemia  continue outpatient PT/OT/SLP                       We had a lengthy discussion regarding the importance of identification of stroke symptoms, including weakness dysarthria, sensory loss, change in vision, language problems and other new acute neurological symptoms. We reviewed the importance of seeking immediate medical attention should these symptoms occur.     Follow up as needed      Total time spent 28 minutes on this encounter which includes face-to-face with the  patient, independent review of the test, obtaining history, performing medical examination, and care coordination.  More than half of the time was spent on counseling and coordination of care.       I have personally seen this patient, reviewed the history and all the available data, performed the physical examination as documented above, and formulated the plan of care. Also discussed with patient about the side effects of the medications. All questions the patient and her/his family have are answered.    Thank you for allowing me to participate in the care of your patient.  If you have any further questions, please do not hesitate to contact me.    This encounter was completed utilizing the Direct Speech Voice Recognition Software. Grammatical errors, random word insertions, pronoun errors, and incomplete sentences are occasional consequences of the system due to software limitation, ambient noise, and hardware issues. These may be missed by proof reading prior to affixing electronic signature. Any questions or concerns about the content, text, or information contained within the body of this dictation should be directly addressed to the physician for clarification. If you have any questions or concerns please do not hesitate to call me directly.       Ney Aparicio MD  Vascular Neurology

## 2024-02-20 ENCOUNTER — HOSPITAL ENCOUNTER (OUTPATIENT)
Dept: OCCUPATIONAL THERAPY | Age: 79
Setting detail: THERAPIES SERIES
Discharge: HOME | End: 2024-02-20
Attending: INTERNAL MEDICINE
Payer: MEDICARE

## 2024-02-20 ENCOUNTER — HOSPITAL ENCOUNTER (OUTPATIENT)
Dept: SPEECH THERAPY | Age: 79
Setting detail: THERAPIES SERIES
Discharge: HOME | End: 2024-02-20
Attending: INTERNAL MEDICINE
Payer: MEDICARE

## 2024-02-20 DIAGNOSIS — I63.9 CARDIOEMBOLIC STROKE (CMS/HCC): ICD-10-CM

## 2024-02-20 DIAGNOSIS — I63.9 CEREBROVASCULAR ACCIDENT (CVA), UNSPECIFIED MECHANISM (CMS/HCC): Primary | ICD-10-CM

## 2024-02-20 DIAGNOSIS — I63.512 ACUTE ISCHEMIC LEFT MCA STROKE (CMS/HCC): ICD-10-CM

## 2024-02-20 DIAGNOSIS — R27.9 LACK OF COORDINATION: ICD-10-CM

## 2024-02-20 DIAGNOSIS — I63.512 ACUTE ISCHEMIC LEFT MCA STROKE (CMS/HCC): Primary | ICD-10-CM

## 2024-02-20 DIAGNOSIS — M62.81 MUSCLE WEAKNESS (GENERALIZED): ICD-10-CM

## 2024-02-20 PROCEDURE — 200200 PR NO CHARGE: Performed by: INTERNAL MEDICINE

## 2024-02-20 PROCEDURE — 97112 NEUROMUSCULAR REEDUCATION: CPT | Mod: GO,59

## 2024-02-20 PROCEDURE — 97535 SELF CARE MNGMENT TRAINING: CPT | Mod: GO

## 2024-02-20 PROCEDURE — 92507 TX SP LANG VOICE COMM INDIV: CPT | Mod: GN

## 2024-02-20 NOTE — PROGRESS NOTES
Speech-Language Pathology Visit      SLP DAILY NOTE FOR OUTPATIENT THERAPY    Patient: Jennifer Sams   MRN: [id number]  : 1945 79 y.o.  Referring Physician: Ney Aparicio,*  Date of Visit: 2024      Certification Dates: 02/15/24 through 24    Diagnosis:   1. Cerebrovascular accident (CVA), unspecified mechanism (CMS/HCC)    2. Cardioembolic stroke (CMS/HCC)    3. Acute ischemic left MCA stroke (CMS/HCC)        Chief Complaints:  apraxia, aphasia    Precautions:  Precautions Comments: aphasia, apraxia, pacemaker       TODAY'S VISIT:    Session Time:  Start Time:   Stop Time: 1600  Time Calculation (min): 48 min   History/Vitals/Pain/Encounter Info - 24        Injury History/Precautions/Daily Required Info    Primary Therapist Magnolia Lundy MS,CCC/SLP     Chief Complaint/Reason for Visit  apraxia, aphasia     Onset of Illness/Injury or Date of Surgery 23     Referring Physician Dr. Aparicio     Precautions Comments aphasia, apraxia, pacemaker     Limitations/Impairments safety/cognitive     Document Type daily treatment     Patient/Family/Caregiver Comments/Observations Pt arrived late this date.     Patient reported fall since last visit No        Pain Assessment    Currently in pain No/Denies                Daily Treatment Assessment and Plan - 24        Daily Treatment Assessment and Plan    Progress toward goals Progressing     Daily Outcome Summary Improved self cues for labial position on 1-syllable bilabial target words.     Plan and Recommendations Continue independence with 1-syllable bilabial and tip alveolar.  Practice repeating single functional words. Y/N to complex sentences read aloud.                  OBJECTIVE MEASUREMENTS TAKEN TODAY:    None Taken    Today's Treatment:     Today's Treatment:  LANGUAGE SLP FLOW SHEET    SKILL AREA CURRENT SESSION   SPEECH THERAPY: LANGUAGE  CPT 93909    MOTOR SPEECH  Pt will produce 1-3 syllable  words   with /p,b,m,w,t,d,n,s,l/ in initial position 80% of presentations.  2/20/2024   /b/ (I) 1-syllable.    7/10 picture cues   10/10 direct model     /p/ (I) 1-syllable.    5/7 with direct model    /t/ (I) 1 syllable  5/8 with direct model and multiple trials.    Pt will repeat familiar greetings and common exchanges provided a direct model, 80% of presentations. 2/20/2024 13/17 Single functional words.     SPOKEN LANG COMP  Pt will demonstrate understanding of complex sentences and short paragraphs with 80% accuracy to y/n questions. 2/20/2024  Auditory paired with word for short paragraph- MC66%     Pt will follow 2 step commands with 4 components, 80% of presentations.    SPOKEN LANG EXP  Provided a descriptive cue and a F6 pictures written words, pt will name the described item, 80% of trials, min A     Given simple picture cue pt will produce simple sentences of 3-4 words with simplified grammatical form and approximated content words to convey meaning, Min A, 80% of presentations.    READING  Pt will match picture to word F4 90% of presentations. 2/20/2024  F4: match picture to short phrase- 8/10     Pt will select picture/word for sentence completion, 80%    Pt will demonstrate reading comprehension of sentences to brief paragraph- answering multiple choice questions, 80% min A    Education/Strategy Training    Other

## 2024-02-20 NOTE — OP OT TREATMENT LOG
OT NEURO FLOW SHEET    EXERCISES  Initial Evaluation  Progress Note 1  Progress Note 2  Re-evaluation  Progress note 3  Progress note 4  Re-evaluation CURRENT SESSION  8/16/2023  9/14/2023  10/23/23  11/14/23  12/14/24  1/18/24  2/6/24 TIME         NEURO RE-ED  CPT 08562 TOTAL TIME FOR SESSION 23-37 Minutes   AAROM/AROM     Strengthening BUE light flex bar pronation and supination x 10 repetitions.     RUE flex bar ulnar deviation x 10 repetitions with minimal assist for motor control.      Strength       Gross Motor Control     Fine Motor Control Right hand reach to 90 degrees to retrive medium sized peg and place on peg board with 80% accuracy.     Bilateral hands to connect pegs x 15 and then remove with 2 drops and minimal cues for right hand in dominant role.     Bilateral fine motor task to thumb cards with left hand, grasp and place on table with right hand using lateral pinch x 25 x 2 sets.     Sitting Jennifer/Balance     Standing Jennifer/Balance     Sensory re-education     Graded motor imagery      Retraining Color identification in field of 6 with 100% accuracy.     Patient able to read written word and identify image on card x 20 with 100% accuracy.     Patient visualized animals and then able to copy written word with 100% accuracy. Patient unable to write word without visual model. Left handwriting.     Patient able to write her full name with 3 verbal cues for letter formation without visual model.     THERE ACT  CPT 32656'  TOTAL TIME FOR SESSION 0-7 Minutes   Pain, Vitals, Meds, Etc. pain assessed/monitored      Assessment     HEP  12/15/23: Added towel grasp and fold exercise, right hand only; patient expressed good understanding.    11/2/23: Review pinch strengthening positions today for improved carry over    10/26/23: Added soft theraputty for right hand pinch (lateral pinch, tip pinch, 3 jaw patti); patient expressed good understnading    Discussed carry over with use of SF splint    9/28/23:  Laterality exercise added to HEP; patient and caregiver expressed good understanding    Right hand stretching    9/21/2023:   -Educated on use of rice/bean sensory bin, massage, sensory re-education with deep pressure and textures. Issued tubigrip for RUE sensory re-education and advised to wear 1x per day. Continued to discuss edema management.     Functional Mobility     Transfers     THERE EX  CPT 19691 TOTAL TIME FOR SESSION Not performed   PROM     Activity Tolerance     SELF-CARE  CPT 62692 TOTAL TIME FOR SESSION 23-37 Minutes   Pt Education/Safety     ADL Training     IADL Training Engaged in simulated kitchen/meal preparation tasks:   -rolling out dough using flex bar and theraputty  -rolling up dough using theraputty  -cutting dough using flexbar pushed into theraputty with cylindrical grasp    MODALITIES  CPT 49705 TOTAL TIME FOR SESSION Not performed   Ice/Heat     ATTENDED E-STIM  CPT 71774 TOTAL TIME FOR SESSION Not performed   Attended E-Stim     SPLINTING  CPT 23967 TOTAL TIME FOR SESSION Not performed   Fabrication/Check Out     Fit     Training     GROUP  CPT 91433 TOTAL TIME FOR SESSION Not performed             Normative Range for Female 75+ Years of Age       Right Hand   Left Hand      Initial Evaluation Progress Note 1 Norm Initial Evaluation Progress Note 1 Norm    Strength 0 lbs 0 lbs 42.6 lbs 30 lbs 30 lbs 37.6 lbs   Lateral Pinch N/T 2 lbs 12.6 lbs N/T 14 lbs 11.4 lbs   Three Jaw Yonatan N/T 2 lbs (assist for position) 12 lbs N/T 11 lbs 11.5 lbs   Tip Pinch N/T N/T 9.6 lbs N/T 9 lbs 9.3 lbs   Box and Block 4 blocks 22 blocks 65 blocks 50 blocks 56 blocks 63.6 blocks   Nine Hole Peg (71+ age) Unable to complete Unable to complete 22.49 seconds N/T 24.55 seconds 24.11 seconds

## 2024-02-20 NOTE — OP SLP TREATMENT LOG
Today's Treatment:  LANGUAGE SLP FLOW SHEET    SKILL AREA CURRENT SESSION   SPEECH THERAPY: LANGUAGE  CPT 62354    MOTOR SPEECH  Pt will produce 1-3 syllable words   with /p,b,m,w,t,d,n,s,l/ in initial position 80% of presentations.  2/20/2024   /b/ (I) 1-syllable.    7/10 picture cues   10/10 direct model     /p/ (I) 1-syllable.    5/7 with direct model    /t/ (I) 1 syllable  5/8 with direct model and multiple trials.    Pt will repeat familiar greetings and common exchanges provided a direct model, 80% of presentations. 2/20/2024 13/17 Single functional words.     SPOKEN LANG COMP  Pt will demonstrate understanding of complex sentences and short paragraphs with 80% accuracy to y/n questions. 2/20/2024  Auditory paired with word for short paragraph- MC66%     Pt will follow 2 step commands with 4 components, 80% of presentations.    SPOKEN LANG EXP  Provided a descriptive cue and a F6 pictures written words, pt will name the described item, 80% of trials, min A     Given simple picture cue pt will produce simple sentences of 3-4 words with simplified grammatical form and approximated content words to convey meaning, Min A, 80% of presentations.    READING  Pt will match picture to word F4 90% of presentations. 2/20/2024  F4: match picture to short phrase- 8/10     Pt will select picture/word for sentence completion, 80%    Pt will demonstrate reading comprehension of sentences to brief paragraph- answering multiple choice questions, 80% min A    Education/Strategy Training    Other

## 2024-02-22 ENCOUNTER — HOSPITAL ENCOUNTER (OUTPATIENT)
Dept: OCCUPATIONAL THERAPY | Age: 79
Setting detail: THERAPIES SERIES
Discharge: HOME | End: 2024-02-22
Attending: INTERNAL MEDICINE
Payer: MEDICARE

## 2024-02-22 ENCOUNTER — HOSPITAL ENCOUNTER (OUTPATIENT)
Dept: SPEECH THERAPY | Age: 79
Setting detail: THERAPIES SERIES
Discharge: HOME | End: 2024-02-22
Attending: INTERNAL MEDICINE
Payer: MEDICARE

## 2024-02-22 DIAGNOSIS — R27.9 LACK OF COORDINATION: ICD-10-CM

## 2024-02-22 DIAGNOSIS — I63.9 CARDIOEMBOLIC STROKE (CMS/HCC): Primary | ICD-10-CM

## 2024-02-22 DIAGNOSIS — I63.9 CEREBROVASCULAR ACCIDENT (CVA), UNSPECIFIED MECHANISM (CMS/HCC): Primary | ICD-10-CM

## 2024-02-22 PROCEDURE — 97112 NEUROMUSCULAR REEDUCATION: CPT | Mod: GO,59

## 2024-02-22 PROCEDURE — 97535 SELF CARE MNGMENT TRAINING: CPT | Mod: GO

## 2024-02-22 PROCEDURE — 92507 TX SP LANG VOICE COMM INDIV: CPT | Mod: GN

## 2024-02-22 NOTE — OP SLP TREATMENT LOG
Today's Treatment:  LANGUAGE SLP FLOW SHEET    SKILL AREA CURRENT SESSION   SPEECH THERAPY: LANGUAGE  CPT 80517    MOTOR SPEECH  Pt will produce 1-3 syllable words   with /p,b,m,w,t,d,n,s,l/ in initial position 80% of presentations.  2/22/2024  ++++++-+++  p- +-+++-  M: +----  Improved with visual verbal cues. To 5/5  T: --- 4/5   D: pt able to repeat 'dog'     2/20/2024   /b/ (I) 1-syllable.    7/10 picture cues   10/10 direct model     /p/ (I) 1-syllable.    5/7 with direct model    /t/ (I) 1 syllable  5/8 with direct model and multiple trials.    Pt will repeat familiar greetings and common exchanges provided a direct model, 80% of presentations. 2/22/2024  15/18  -+-+++++++-+++++++  2/20/2024 13/17 Single functional words.     SPOKEN LANG COMP  Pt will demonstrate understanding of complex sentences and short paragraphs with 80% accuracy to y/n questions. 2/22/2024 18/20 (90%);  3 word questions  16/17 (94%); 5-7 word questions.     F2, pt pointed to the described picture: sent home for practice  6/8 (75%),   4/8 (50%)     2/20/2024  Auditory paired with word for short paragraph- MC66%     Pt will follow 2 step commands with 4 components, 80% of presentations.    SPOKEN LANG EXP  Provided a descriptive cue and a F6 pictures written words, pt will name the described item, 80% of trials, min A     Given simple picture cue pt will produce simple sentences of 3-4 words with simplified grammatical form and approximated content words to convey meaning, Min A, 80% of presentations.    READING  Pt will match picture to word F4 90% of presentations. 2/20/2024  F4: match picture to short phrase- 8/10     Pt will select picture/word for sentence completion, 80%    Pt will demonstrate reading comprehension of sentences to brief paragraph- answering multiple choice questions, 80% min A    Education/Strategy Training    Other

## 2024-02-22 NOTE — PROGRESS NOTES
"Speech-Language Pathology Visit      SLP DAILY NOTE FOR OUTPATIENT THERAPY    Patient: Jennifer Sams   MRN: [id number]  : 1945 79 y.o.  Referring Physician: Ney Aparicio,*  Date of Visit: 2024      Certification Dates: 02/15/24 through 24    Diagnosis:   1. Cerebrovascular accident (CVA), unspecified mechanism (CMS/HCC)        Chief Complaints:  apraxia, aphasia    Precautions:  Precautions Comments: aphasia, apraxia, pacemaker       TODAY'S VISIT:    Session Time:  Start Time: 1600  Stop Time: 1700  Time Calculation (min): 60 min   History/Vitals/Pain/Encounter Info - 24 1607        Injury History/Precautions/Daily Required Info    Primary Therapist Magnolia Lundy MS,CCC/SLP     Chief Complaint/Reason for Visit  apraxia, aphasia     Onset of Illness/Injury or Date of Surgery 23     Referring Physician Dr. Aparicio     Precautions Comments aphasia, apraxia, pacemaker     Limitations/Impairments safety/cognitive     Document Type daily treatment     Patient/Family/Caregiver Comments/Observations Pt reported difficulty saying \"Hey dude\"     Patient reported fall since last visit No        Pain Assessment    Currently in pain No/Denies                Daily Treatment Assessment and Plan - 24 1607        Daily Treatment Assessment and Plan    Daily Outcome Summary Improved self cues for labial position in 1-syllable words,  Improved imitation of tip alveolar with auditory bombardment.     Plan and Recommendations Continue 1-syllable bilabial and tip aleolar sounds.  Listen to paragraph and answer yes/no questions.  2 step commands? 4 components                  OBJECTIVE MEASUREMENTS TAKEN TODAY:    None Taken    Today's Treatment:     Today's Treatment:  LANGUAGE SLP FLOW SHEET    SKILL AREA CURRENT SESSION   SPEECH THERAPY: LANGUAGE  CPT 03416    MOTOR SPEECH  Pt will produce 1-3 syllable words   with /p,b,m,w,t,d,n,s,l/ in initial position 80% of presentations.  " 2/22/2024  ++++++-+++  p- +-+++-  M: +----  Improved with visual verbal cues. To 5/5  T: --- 4/5   D: pt able to repeat 'dog'     2/20/2024   /b/ (I) 1-syllable.    7/10 picture cues   10/10 direct model     /p/ (I) 1-syllable.    5/7 with direct model    /t/ (I) 1 syllable  5/8 with direct model and multiple trials.    Pt will repeat familiar greetings and common exchanges provided a direct model, 80% of presentations. 2/22/2024  15/18  -+-+++++++-+++++++  2/20/2024 13/17 Single functional words.     SPOKEN LANG COMP  Pt will demonstrate understanding of complex sentences and short paragraphs with 80% accuracy to y/n questions. 2/22/2024 18/20 (90%);  3 word questions  16/17 (94%); 5-7 word questions.     F2, pt pointed to the described picture: sent home for practice  6/8 (75%),   4/8 (50%)     2/20/2024  Auditory paired with word for short paragraph- MC66%     Pt will follow 2 step commands with 4 components, 80% of presentations.    SPOKEN LANG EXP  Provided a descriptive cue and a F6 pictures written words, pt will name the described item, 80% of trials, min A     Given simple picture cue pt will produce simple sentences of 3-4 words with simplified grammatical form and approximated content words to convey meaning, Min A, 80% of presentations.    READING  Pt will match picture to word F4 90% of presentations. 2/20/2024  F4: match picture to short phrase- 8/10     Pt will select picture/word for sentence completion, 80%    Pt will demonstrate reading comprehension of sentences to brief paragraph- answering multiple choice questions, 80% min A    Education/Strategy Training    Other

## 2024-02-23 NOTE — OP OT TREATMENT LOG
OT NEURO FLOW SHEET    EXERCISES  Initial Evaluation  Progress Note 1  Progress Note 2  Re-evaluation  Progress note 3  Progress note 4  Re-evaluation CURRENT SESSION  8/16/2023  9/14/2023  10/23/23  11/14/23  12/14/24  1/18/24  2/6/24 TIME         NEURO RE-ED  CPT 69960 TOTAL TIME FOR SESSION 8-22 Minutes   AAROM/AROM     Strengthening      Strength   Right hand  strengthening with purple theraputty    Gross Motor Control     Fine Motor Control Patient participated in left hand print exercise with visual prompting by therapist    Sitting Jennifer/Balance     Standing Jennifer/Balance     Sensory re-education     Graded motor imagery      Retraining     THERE ACT  CPT 96073'  TOTAL TIME FOR SESSION 0-7 Minutes   Pain, Vitals, Meds, Etc. Vitals taken, pain assessed/monitored      Assessment     HEP  12/15/23: Added towel grasp and fold exercise, right hand only; patient expressed good understanding.    11/2/23: Review pinch strengthening positions today for improved carry over    10/26/23: Added soft theraputty for right hand pinch (lateral pinch, tip pinch, 3 jaw patti); patient expressed good understnading    Discussed carry over with use of SF splint    9/28/23: Laterality exercise added to HEP; patient and caregiver expressed good understanding    Right hand stretching    9/21/2023:   -Educated on use of rice/bean sensory bin, massage, sensory re-education with deep pressure and textures. Issued tubigrip for RUE sensory re-education and advised to wear 1x per day. Continued to discuss edema management.     Functional Mobility     Transfers     THERE EX  CPT 61073 TOTAL TIME FOR SESSION Not performed   PROM     Activity Tolerance     SELF-CARE  CPT 10780 TOTAL TIME FOR SESSION 38-52 Minutes   Pt Education/Safety     ADL Training Simulated feeding activity with use of right hand for grasping fork to stabilize food and left hand to cut food; patient required [place] assist at right hand and frequent repositioning to  avoid dropping    IADL Training Therapist facilitated completion of interest checklist.    MODALITIES  CPT 46346 TOTAL TIME FOR SESSION Not performed   Ice/Heat     ATTENDED E-STIM  CPT 77743 TOTAL TIME FOR SESSION Not performed   Attended E-Stim     SPLINTING  CPT 36716 TOTAL TIME FOR SESSION Not performed   Fabrication/Check Out     Fit     Training     GROUP  CPT 09592 TOTAL TIME FOR SESSION Not performed             Normative Range for Female 75+ Years of Age       Right Hand   Left Hand      Initial Evaluation Progress Note 1 Norm Initial Evaluation Progress Note 1 Norm    Strength 0 lbs 0 lbs 42.6 lbs 30 lbs 30 lbs 37.6 lbs   Lateral Pinch N/T 2 lbs 12.6 lbs N/T 14 lbs 11.4 lbs   Three Jaw Yonatan N/T 2 lbs (assist for position) 12 lbs N/T 11 lbs 11.5 lbs   Tip Pinch N/T N/T 9.6 lbs N/T 9 lbs 9.3 lbs   Box and Block 4 blocks 22 blocks 65 blocks 50 blocks 56 blocks 63.6 blocks   Nine Hole Peg (71+ age) Unable to complete Unable to complete 22.49 seconds N/T 24.55 seconds 24.11 seconds

## 2024-02-23 NOTE — PROGRESS NOTES
Occupational Therapy Visit    OT DAILY NOTE FOR OUTPATIENT THERAPY    Patient: Jennifer Sams   MRN: [id number]  : 1945 79 y.o.  Referring Physician: Ney Aparicio,*  Date of Visit: 2024      Certification Dates: 24 through 24    Diagnosis:   1. Cardioembolic stroke (CMS/HCC)    2. Lack of coordination        Chief Complaints:   Brain Injury    Precautions:       TODAY'S VISIT    Time In Session:  Start Time: 1700  Stop Time: 1800  Time Calculation (min): 60 min   History/Vitals/Pain/Encounter Info - 24 1705        Injury History/Precautions/Daily Required Info    Document Type daily treatment     Primary Therapist Barry Rosas, OTR/L     Chief Complaint/Reason for Visit  Brain Injury     Onset of Illness/Injury or Date of Surgery 23     Referring Physician Dr. Haywood     History of present illness/functional impairment The patient is a 78-year-old  female with a history of AAA, heart block s/p cardiac pacemaker, glaucoma, hyperlipidemia, coronary artery disease, NSTEMI, who presented to [facility] on  after she has not been seen by her friends for a few days.  Patient lives alone and when she was found by EMS she was confused and combative.  In the emergency department she was aphasic with right-sided weakness and hypertensive to the 190 systolic.  CT scan of the head revealed an acute infarct in left MCA with mild bleeding.  Echo showed an EF of 43% and a bubble study was negative.  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  Later this was transitioned to apixaban.  She was continued on statin for secondary prevention.  She required virtual shivani while in the hospital but this was not effective.  She was evaluated by speech therapy and cleared for a soft and bite-size diet with moderately thick liquids.'     Patient/Family/Caregiver  Comments/Observations Patient arrives accompanied by caregiver Thelma     Patient reported fall since last visit No        Pain Assessment    Currently in pain No/Denies        Pre Activity Vital Signs    /80     BP Location Left upper arm     BP Method Manual     Patient Position Sitting         Services    Do You Speak a Language Other Than English at Home? no                Daily Treatment Assessment and Plan - 02/22/24 2042        Daily Treatment Assessment and Plan    Progress toward goals Progressing     Daily Outcome Summary Patient participated in OT session with focus on exploring leisure activities with use of interest checklist. Patient able to review checklist and reanna items of interest with left hand, and then write them out with visual cueing from therapist. Patient required [place] assist for feeding task due to inconsistent grasp at right hand; she demonstrates improvement with practice and able to make several cuts in food with frequent repositioning of large handled fork.     Plan and Recommendations task-oriented practice, fine and gross motor, functional tasks with right hand                     OBJECTIVE DATA TAKEN TODAY    None Taken    Today's Treatment:      OT NEURO FLOW SHEET    EXERCISES  Initial Evaluation  Progress Note 1  Progress Note 2  Re-evaluation  Progress note 3  Progress note 4  Re-evaluation CURRENT SESSION  8/16/2023  9/14/2023  10/23/23  11/14/23  12/14/24  1/18/24  2/6/24 TIME         NEURO RE-ED  CPT 93016 TOTAL TIME FOR SESSION 8-22 Minutes   AAROM/AROM     Strengthening      Strength   Right hand  strengthening with purple theraputty    Gross Motor Control     Fine Motor Control Patient participated in left hand print exercise with visual prompting by therapist    Sitting Jennifer/Balance     Standing Jennifer/Balance     Sensory re-education     Graded motor imagery      Retraining     THERE ACT  CPT 35284'  TOTAL TIME FOR SESSION 0-7 Minutes   Pain, Vitals,  Meds, Etc. Vitals taken, pain assessed/monitored      Assessment     HEP  12/15/23: Added towel grasp and fold exercise, right hand only; patient expressed good understanding.    11/2/23: Review pinch strengthening positions today for improved carry over    10/26/23: Added soft theraputty for right hand pinch (lateral pinch, tip pinch, 3 jaw patti); patient expressed good understnading    Discussed carry over with use of SF splint    9/28/23: Laterality exercise added to HEP; patient and caregiver expressed good understanding    Right hand stretching    9/21/2023:   -Educated on use of rice/bean sensory bin, massage, sensory re-education with deep pressure and textures. Issued tubigrip for RUE sensory re-education and advised to wear 1x per day. Continued to discuss edema management.     Functional Mobility     Transfers     THERE EX  CPT 50104 TOTAL TIME FOR SESSION Not performed   PROM     Activity Tolerance     SELF-CARE  CPT 67814 TOTAL TIME FOR SESSION 38-52 Minutes   Pt Education/Safety     ADL Training Simulated feeding activity with use of right hand for grasping fork to stabilize food and left hand to cut food; patient required [place] assist at right hand and frequent repositioning to avoid dropping    IADL Training Therapist facilitated completion of interest checklist.    MODALITIES  CPT 98494 TOTAL TIME FOR SESSION Not performed   Ice/Heat     ATTENDED E-STIM  CPT 50454 TOTAL TIME FOR SESSION Not performed   Attended E-Stim     SPLINTING  CPT 54706 TOTAL TIME FOR SESSION Not performed   Fabrication/Check Out     Fit     Training     GROUP  CPT 14940 TOTAL TIME FOR SESSION Not performed             Normative Range for Female 75+ Years of Age       Right Hand   Left Hand      Initial Evaluation Progress Note 1 Norm Initial Evaluation Progress Note 1 Norm    Strength 0 lbs 0 lbs 42.6 lbs 30 lbs 30 lbs 37.6 lbs   Lateral Pinch N/T 2 lbs 12.6 lbs N/T 14 lbs 11.4 lbs   Three Jaw Patti N/T 2 lbs (assist for  position) 12 lbs N/T 11 lbs 11.5 lbs   Tip Pinch N/T N/T 9.6 lbs N/T 9 lbs 9.3 lbs   Box and Block 4 blocks 22 blocks 65 blocks 50 blocks 56 blocks 63.6 blocks   Nine Hole Peg (71+ age) Unable to complete Unable to complete 22.49 seconds N/T 24.55 seconds 24.11 seconds

## 2024-02-29 ENCOUNTER — HOSPITAL ENCOUNTER (OUTPATIENT)
Dept: OCCUPATIONAL THERAPY | Age: 79
Setting detail: THERAPIES SERIES
Discharge: HOME | End: 2024-02-29
Attending: INTERNAL MEDICINE
Payer: MEDICARE

## 2024-02-29 ENCOUNTER — HOSPITAL ENCOUNTER (OUTPATIENT)
Dept: SPEECH THERAPY | Age: 79
Setting detail: THERAPIES SERIES
Discharge: HOME | End: 2024-02-29
Attending: INTERNAL MEDICINE
Payer: MEDICARE

## 2024-02-29 DIAGNOSIS — I63.9 CARDIOEMBOLIC STROKE (CMS/HCC): Primary | ICD-10-CM

## 2024-02-29 DIAGNOSIS — I63.9 CEREBROVASCULAR ACCIDENT (CVA), UNSPECIFIED MECHANISM (CMS/HCC): Primary | ICD-10-CM

## 2024-02-29 DIAGNOSIS — R27.9 LACK OF COORDINATION: ICD-10-CM

## 2024-02-29 PROCEDURE — 97530 THERAPEUTIC ACTIVITIES: CPT | Mod: GO,59

## 2024-02-29 PROCEDURE — 97535 SELF CARE MNGMENT TRAINING: CPT | Mod: GO

## 2024-02-29 PROCEDURE — 92507 TX SP LANG VOICE COMM INDIV: CPT | Mod: GN

## 2024-02-29 PROCEDURE — 97112 NEUROMUSCULAR REEDUCATION: CPT | Mod: GO,59

## 2024-03-04 ENCOUNTER — APPOINTMENT (OUTPATIENT)
Age: 79
Setting detail: DERMATOLOGY
End: 2024-03-05

## 2024-03-04 DIAGNOSIS — L82.0 INFLAMED SEBORRHEIC KERATOSIS: ICD-10-CM

## 2024-03-04 DIAGNOSIS — L82.1 OTHER SEBORRHEIC KERATOSIS: ICD-10-CM

## 2024-03-04 DIAGNOSIS — L57.8 OTHER SKIN CHANGES DUE TO CHRONIC EXPOSURE TO NONIONIZING RADIATION: ICD-10-CM

## 2024-03-04 DIAGNOSIS — D22 MELANOCYTIC NEVI: ICD-10-CM

## 2024-03-04 DIAGNOSIS — L81.4 OTHER MELANIN HYPERPIGMENTATION: ICD-10-CM

## 2024-03-04 DIAGNOSIS — L57.0 ACTINIC KERATOSIS: ICD-10-CM

## 2024-03-04 PROBLEM — D22.61 MELANOCYTIC NEVI OF RIGHT UPPER LIMB, INCLUDING SHOULDER: Status: ACTIVE | Noted: 2024-03-04

## 2024-03-04 PROCEDURE — OTHER MIPS QUALITY: OTHER

## 2024-03-04 PROCEDURE — 11102 TANGNTL BX SKIN SINGLE LES: CPT

## 2024-03-04 PROCEDURE — 17000 DESTRUCT PREMALG LESION: CPT | Mod: 59

## 2024-03-04 PROCEDURE — OTHER SUNSCREEN RECOMMENDATIONS: OTHER

## 2024-03-04 PROCEDURE — OTHER BIOPSY BY SHAVE METHOD: OTHER

## 2024-03-04 PROCEDURE — OTHER COUNSELING: OTHER

## 2024-03-04 PROCEDURE — OTHER LIQUID NITROGEN: OTHER

## 2024-03-04 PROCEDURE — 99213 OFFICE O/P EST LOW 20 MIN: CPT | Mod: 25

## 2024-03-04 ASSESSMENT — LOCATION SIMPLE DESCRIPTION DERM
LOCATION SIMPLE: RIGHT CLAVICULAR SKIN
LOCATION SIMPLE: RIGHT THIGH
LOCATION SIMPLE: LEFT LOWER BACK
LOCATION SIMPLE: UPPER BACK
LOCATION SIMPLE: RIGHT UPPER BACK
LOCATION SIMPLE: RIGHT UPPER ARM

## 2024-03-04 ASSESSMENT — LOCATION DETAILED DESCRIPTION DERM
LOCATION DETAILED: RIGHT CLAVICULAR SKIN
LOCATION DETAILED: SUPERIOR THORACIC SPINE
LOCATION DETAILED: RIGHT SUPERIOR UPPER BACK
LOCATION DETAILED: LEFT INFERIOR MEDIAL MIDBACK
LOCATION DETAILED: RIGHT PROXIMAL POSTERIOR UPPER ARM
LOCATION DETAILED: RIGHT ANTERIOR PROXIMAL THIGH

## 2024-03-04 ASSESSMENT — LOCATION ZONE DERM
LOCATION ZONE: LEG
LOCATION ZONE: TRUNK
LOCATION ZONE: ARM

## 2024-03-04 NOTE — PROCEDURE: LIQUID NITROGEN
Duration Of Freeze Thaw-Cycle (Seconds): 0
Post-Care Instructions: I reviewed with the patient in detail post-care instructions. Patient is to wear sunprotection, and avoid picking at any of the treated lesions. Pt may apply Vaseline to crusted or scabbing areas.
Detail Level: Zone
Consent: The patient's consent was obtained including but not limited to risks of crusting, scabbing, blistering, scarring, darker or lighter pigmentary change, recurrence, incomplete removal and infection.
Render In Bullet Format When Appropriate: No

## 2024-03-04 NOTE — PROCEDURE: BIOPSY BY SHAVE METHOD
Detail Level: Detailed
Depth Of Biopsy: dermis
Was A Bandage Applied: Yes
Size Of Lesion In Cm: 0
Biopsy Type: H and E
Biopsy Method: Personna blade
Anesthesia Type: 1% lidocaine with 1:100,000 epinephrine
Anesthesia Volume In Cc: 0.5
Hemostasis: Drysol
Wound Care: Petrolatum
Dressing: bandage
Destruction After The Procedure: No
Type Of Destruction Used: Curettage
Curettage Text: The wound bed was treated with curettage after the biopsy was performed.
Cryotherapy Text: The wound bed was treated with cryotherapy after the biopsy was performed.
Electrodesiccation Text: The wound bed was treated with electrodesiccation after the biopsy was performed.
Electrodesiccation And Curettage Text: The wound bed was treated with electrodesiccation and curettage after the biopsy was performed.
Silver Nitrate Text: The wound bed was treated with silver nitrate after the biopsy was performed.
Lab: -[id number]
Consent: Written consent was obtained and risks were reviewed including but not limited to scarring, infection, bleeding, scabbing, incomplete removal, nerve damage and allergy to anesthesia.
Post-Care Instructions: The medical assistant reviewed with the patient post-care instructions. Patient is to keep the bandage on the biopsy site dry overnight, and then apply aquaphor or petrolatum twice daily for 4 days or until healed. Patient may apply warm water soaks to remove any crusting.
Notification Instructions: Patient will be notified of biopsy results. However, patient instructed to call the office if not contacted within 2 weeks.
Billing Type: Third-Party Bill
Information: Selecting Yes will display possible errors in your note based on the variables you have selected. This validation is only offered as a suggestion for you. PLEASE NOTE THAT THE VALIDATION TEXT WILL BE REMOVED WHEN YOU FINALIZE YOUR NOTE. IF YOU WANT TO FAX A PRELIMINARY NOTE YOU WILL NEED TO TOGGLE THIS TO 'NO' IF YOU DO NOT WANT IT IN YOUR FAXED NOTE.

## 2024-03-05 DIAGNOSIS — I63.9 ISCHEMIC STROKE (CMS/HCC): Primary | ICD-10-CM

## 2024-03-08 ENCOUNTER — HOSPITAL ENCOUNTER (OUTPATIENT)
Dept: SPEECH THERAPY | Age: 79
Setting detail: THERAPIES SERIES
Discharge: HOME | End: 2024-03-08
Attending: INTERNAL MEDICINE
Payer: MEDICARE

## 2024-03-08 DIAGNOSIS — I63.9 CEREBROVASCULAR ACCIDENT (CVA), UNSPECIFIED MECHANISM (CMS/HCC): Primary | ICD-10-CM

## 2024-03-08 PROCEDURE — 92507 TX SP LANG VOICE COMM INDIV: CPT | Mod: GN

## 2024-03-08 NOTE — OP SLP TREATMENT LOG
Today's Treatment:  LANGUAGE SLP FLOW SHEET    SKILL AREA CURRENT SESSION   SPEECH THERAPY: LANGUAGE  CPT 05377    MOTOR SPEECH  Pt will produce 1-3 syllable words   with /p,b,m,w,t,d,n,s,l/ in initial position 80% of presentations.  2/29/2024  /b/ initial single syllable 10/10  Practiced 2 syllable with direct model    /p/ initial single syllable - 4/10, improved to 10/10 w/ DM  Practiced 2- syllable with direct model    /t/ initial 1 syllable, direct model-     2/22/2024  ++++++-+++  p- +-+++-  M: +----  Improved with visual verbal cues. To 5/5  T: --- 4/5   D: pt able to repeat 'dog'     2/20/2024   /b/ (I) 1-syllable.    7/10 picture cues   10/10 direct model     /p/ (I) 1-syllable.    5/7 with direct model    /t/ (I) 1 syllable  5/8 with direct model and multiple trials.    Pt will repeat familiar greetings and common exchanges provided a direct model, 80% of presentations. 3/8/2024  15/18    2/22/2024  15/18    2/20/2024  13/17 Single functional words.     SPOKEN LANG COMP  Pt will demonstrate understanding of complex sentences and short paragraphs with 80% accuracy to y/n questions.    WALC1: p 191-192 etc 3/8/2023  Same paragraph extension.    3/3  5/6  10/10    Full length of paragraph: 80%    2/29/2024  3/5, repeated 4/5  2/5, repeated 3/5, pt becomes upset when hearing MALLY, LEONEL or times.      2/22/2024 18/20 (90%);  3 word questions  16/17 (94%); 5-7 word questions.     F2, pt pointed to the described picture: sent home for practice  6/8 (75%),   4/8 (50%)     2/20/2024  Auditory paired with word for short paragraph- MC66%     Pt will follow 2 step commands with 4 components, 80% of presentations.    SPOKEN LANG EXP  Provided a descriptive cue and a F6 pictures written words, pt will name the described item, 80% of trials, min A     Given simple picture cue pt will produce simple sentences of 3-4 words with simplified grammatical form and approximated content words to convey meaning, Min A, 80% of  presentations. 3/8/2024  2 nouns, non-reversible: 10/10 100%   READING  Pt will match picture to word F4 90% of presentations. 2/29/2024  F4: Match picture to short phrase - 6/10, 8/10    2/20/2024  F4: match picture to short phrase- 8/10     Pt will select picture/word for sentence completion, 80%    Pt will demonstrate reading comprehension of sentences to brief paragraph- answering multiple choice questions, 80% min A    Education/Strategy Training    Other

## 2024-03-08 NOTE — PROGRESS NOTES
Speech-Language Pathology Visit      SLP DAILY NOTE FOR OUTPATIENT THERAPY    Patient: Jennifer Sams   MRN: [id number]  : 1945 79 y.o.  Referring Physician: Ney Aparicio,*  Date of Visit: 3/8/2024      Certification Dates: 02/15/24 through 24    Diagnosis:   1. Cerebrovascular accident (CVA), unspecified mechanism (CMS/HCC)        Chief Complaints:  apraxia, aphasia    Precautions:  Precautions Comments: aphasia, apraxia, pacemaker       TODAY'S VISIT:    Session Time:  Start Time: 1300  Stop Time: 1400  Time Calculation (min): 60 min   History/Vitals/Pain/Encounter Info - 24 1308        Injury History/Precautions/Daily Required Info    Primary Therapist Magnolia Lundy MS,CCC/SLP     Chief Complaint/Reason for Visit  apraxia, aphasia     Onset of Illness/Injury or Date of Surgery 23     Referring Physician Dr. Aparicio     Precautions Comments aphasia, apraxia, pacemaker     Limitations/Impairments safety/cognitive     Document Type daily treatment     Patient/Family/Caregiver Comments/Observations Pt reports concerns with dentist bills, feels possibly unnecessary.     Patient reported fall since last visit No        Pain Assessment    Currently in pain No/Denies        Pain Interventions    Intervention none     Post Intervention Comments none                Daily Treatment Assessment and Plan - 24 1308        Daily Treatment Assessment and Plan    Progress toward goals Progressing     Daily Outcome Summary Part of session spent with conversational discourse, pt talking out things feeling upset about.  Pt was able to convey/express feelings to unfamiliar listener.  Pt able to demonstrate auditory comprehension to moderate length paragraphs.     Plan and Recommendations .  2 step commands? 4 components. Name the described item provided f6 options. Continue 2-3-chazdwqq bilabial and tip aleolar sounds.                  OBJECTIVE MEASUREMENTS TAKEN TODAY:    None  Taken    Today's Treatment:     Today's Treatment:  LANGUAGE SLP FLOW SHEET    SKILL AREA CURRENT SESSION   SPEECH THERAPY: LANGUAGE  CPT 31432    MOTOR SPEECH  Pt will produce 1-3 syllable words   with /p,b,m,w,t,d,n,s,l/ in initial position 80% of presentations.  2/29/2024  /b/ initial single syllable 10/10  Practiced 2 syllable with direct model    /p/ initial single syllable - 4/10, improved to 10/10 w/ DM  Practiced 2- syllable with direct model    /t/ initial 1 syllable, direct model-     2/22/2024  ++++++-+++  p- +-+++-  M: +----  Improved with visual verbal cues. To 5/5  T: --- 4/5   D: pt able to repeat 'dog'     2/20/2024   /b/ (I) 1-syllable.    7/10 picture cues   10/10 direct model     /p/ (I) 1-syllable.    5/7 with direct model    /t/ (I) 1 syllable  5/8 with direct model and multiple trials.    Pt will repeat familiar greetings and common exchanges provided a direct model, 80% of presentations. 3/8/2024  15/18    2/22/2024  15/18    2/20/2024  13/17 Single functional words.     SPOKEN LANG COMP  Pt will demonstrate understanding of complex sentences and short paragraphs with 80% accuracy to y/n questions.    WALC1: p 191-192 etc 3/8/2023  Same paragraph extension.    3/3  5/6  10/10    Full length of paragraph: 80%    2/29/2024  3/5, repeated 4/5  2/5, repeated 3/5, pt becomes upset when hearing MALLY, LEONEL or times.      2/22/2024 18/20 (90%);  3 word questions  16/17 (94%); 5-7 word questions.     F2, pt pointed to the described picture: sent home for practice  6/8 (75%),   4/8 (50%)     2/20/2024  Auditory paired with word for short paragraph- MC66%     Pt will follow 2 step commands with 4 components, 80% of presentations.    SPOKEN LANG EXP  Provided a descriptive cue and a F6 pictures written words, pt will name the described item, 80% of trials, min A     Given simple picture cue pt will produce simple sentences of 3-4 words with simplified grammatical form and approximated content words to  convey meaning, Min A, 80% of presentations. 3/8/2024  2 nouns, non-reversible: 10/10 100%   READING  Pt will match picture to word F4 90% of presentations. 2/29/2024  F4: Match picture to short phrase - 6/10, 8/10    2/20/2024  F4: match picture to short phrase- 8/10     Pt will select picture/word for sentence completion, 80%    Pt will demonstrate reading comprehension of sentences to brief paragraph- answering multiple choice questions, 80% min A    Education/Strategy Training    Other

## 2024-03-12 ENCOUNTER — TELEPHONE (OUTPATIENT)
Dept: SCHEDULING | Facility: CLINIC | Age: 79
End: 2024-03-12
Payer: MEDICARE

## 2024-03-12 ENCOUNTER — HOSPITAL ENCOUNTER (OUTPATIENT)
Dept: SPEECH THERAPY | Age: 79
Setting detail: THERAPIES SERIES
Discharge: HOME | End: 2024-03-12
Attending: INTERNAL MEDICINE
Payer: MEDICARE

## 2024-03-12 DIAGNOSIS — I63.9 CEREBROVASCULAR ACCIDENT (CVA), UNSPECIFIED MECHANISM (CMS/HCC): Primary | ICD-10-CM

## 2024-03-12 PROCEDURE — 92507 TX SP LANG VOICE COMM INDIV: CPT | Mod: GN

## 2024-03-12 NOTE — TELEPHONE ENCOUNTER
I spoke to Thelma and advised her that Dr. Carolina does not need any blood work done prior to upcoming visit.

## 2024-03-12 NOTE — TELEPHONE ENCOUNTER
Pt's JONE chappell would like a call back to see if pt needs any labs done prior to appt     [phone]

## 2024-03-12 NOTE — OP SLP TREATMENT LOG
Today's Treatment:  LANGUAGE SLP FLOW SHEET    SKILL AREA CURRENT SESSION   SPEECH THERAPY: LANGUAGE  CPT 51701    MOTOR SPEECH  Pt will produce 1-3 syllable words   with /p,b,m,w,t,d,n,s,l/ in initial position 80% of presentations.  PN: 3/12/2024  Continue   Pt will repeat familiar greetings and common exchanges provided a direct model, 80% of presentations.  PN: 3/12/2024  Continue   SPOKEN LANG COMP  Pt will demonstrate understanding of complex sentences and short paragraphs with 80% accuracy to y/n questions.    WALC1: p 191-192 etc PN: 3/12/2024  MET-Consider increased accuracy to 90% and/or increased length of paragraph   Pt will follow 2 step commands with 4 components, 80% of presentations. PN: 3/12/2024  Take data   SPOKEN LANG EXP  Provided a descriptive cue and a F6 pictures written words, pt will name the described item, 80% of trials, min A  PN: 3/12/2024  Continue   Given simple picture cue pt will produce simple sentences of 3-4 words with simplified grammatical form and approximated content words to convey meaning, Min A, 80% of presentations. PN: 3/12/2024  Continue- reduce word bank   READING  Pt will match picture to word F4 90% of presentations. PN: 3/12/2024   MET, revise to picture to phrase/sentence   Pt will select picture/word for sentence completion, 80% PN: 3/12/2024  Continue   Pt will demonstrate reading comprehension of sentences to brief paragraph- answering multiple choice questions, 80% min A PN: 3/12/2024  Take data   Education/Strategy Training    Other

## 2024-03-12 NOTE — TELEPHONE ENCOUNTER
Pt's JONE chappell would like a call back to see if pt needs any labs done prior to appt     [phone]

## 2024-03-12 NOTE — LETTER
[address]  [address]  [facility] [place] [facility] [place] [address]  [facility] Fax: [phone]    SPEECH THERAPY UPDATE    Patient Name: Jennifer Sasm    Provider: Magnolia Lundy CCC-SLP  Referring Provider: Ney Aparicio,*  PCP: Chris Wang MD  Payor: Payor: MEDICARE / Plan: MEDICARE PART A & B / Product Type: Medicare /   Medical Diagnosis: Cerebrovascular accident (CVA), unspecified mechanism (CMS/HCC) [I63.9]     Dear Dr. Aparicio,    Thank you for the referral of your patient Jennifer Sams for speech therapy. I am writing to you today to provide you an update on the status of your patient. Please review the latest progress note below and the goals being addressed during this plan of care.     If you have any questions or concerns, please feel free to contact me. Once again, thank you for your referral and the opportunity to work with your patient.     Sincerely,  Magnolia Lundy CCC-SLP      Rehab Potential:      Speech-Language Pathology Progress Note      SLP PROGRESS NOTE FOR OUTPATIENT THERAPY    Patient: Jennifer Sams MRN: [id number]  : 1945 79 y.o.  Referring Physician: Ney Aparicio,*  Date of Visit: 3/12/2024      Certification Dates: 02/15/24 through 24    Recommended Frequency & Duration:  2 times/week for up to 3 months   PN: 2024    Diagnosis:   1. Cerebrovascular accident (CVA), unspecified mechanism (CMS/HCC)        Chief Complaints:  Chief Complaint   Patient presents with   • Speech Problem     Language       Precautions:   Precautions Comments: aphasia, apraxia, pacemaker    TODAY'S VISIT:    Session Time:  Start Time: 1505  Stop Time: 1600  Time Calculation (min): 55 min   General Information - 24 1505        Session Details    Document Type progress note     Mode of Treatment individual therapy        General Information    Onset of Illness/Injury or Date of Surgery 23     Referring Physician Dr. Aparicio     History  of present illness/functional impairment  is a 79 y.o. female who presented to [facility] on June 26 after she was not seen by her friends for a few days.  Ms. Sams lives alone and when she was found by EMS she was confused and combative.  In the emergency department she was aphasic with right-sided weakness and hypertensive to the 190 systolic.  CT scan of the head revealed an acute infarct in left MCA with mild bleeding.  Echo showed an EF of 43% and a bubble study was negative.  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 Pt transferred to  [facility] on 7/1/2023 Principal problem  Acute ischemic left MCA stroke (CMS/HCC). PMH AAA, heart block s/p cardiac pacemaker, glaucoma, hyperlipidemia, coronary artery disease, She was evaluated by speech therapy and cleared for a soft and bite-size diet with moderately thick liquids.  7/2/2023:  Diet SB6 with mildly thick liquids (MT2)  7/6/2023: Upgraded to EC7 with thin liquids.  Pt discharged home with 24 hr care and home health services recieving speech therapy for motor speech and aphasia.  Transferred to outpatient and evaluated at [facility] on 8/18/2024.  Ms. Sams presents for her second re-eval at [facility] 2/8/2024..     Precautions Comments aphasia, apraxia, pacemaker     Limitations/Impairments other (see comments)     Interventions Language impairment     Patient/Family/Caregiver Comments/Observations Pt continues to state concerns with reduced opportunity for communication due to living alone and limited time with friends and family.  Pt expressed disinterest in independent living environment.                Daily Falls Screen - 03/12/24 1505        Daily Falls Assessment    Patient reported fall since last visit No                Pain and Vitals - 03/12/24 1505        Pain Assessment    Currently in pain No/Denies                SLP - 03/12/24 1505        Speech Therapy    Speech Therapy Specialty Traditional Neuro  Program ST        SLP Frequency and Duration    Frequency of treatment 2 times/week     Speech Duration 3 months     SLP Custom Frequency and Duration PN: 4/12/2024     SLP Cert From 02/15/24     SLP Cert To 05/08/24     Date SLP POC was sent to provider 02/08/24     Signed SLP Plan of Care received?  No   resend to Dr. Aparicio               Assessment - 03/14/24 0906        Assessment    Clinical Assessment Ms. Sams continues to actively engage in speech therapy with self directed goals for improved communication with consideration of expressive and receptive aphasia and motor speech difficulties.  Improvements in motor speech have lead to expansion of expressive language skills so that Ms. Sams is able to initiate conversation of an unknown topic and shared the burden with the listener for use of strategies and repair when there is communication breakdown.  Reading continues to improve resulting in the ability to utilize reading cues to further support motor speech production, syntax and auditory comprehension.  Continuation of the plan of care is recommended.     Plan and Recommendations .  2 step commands? 4 components. Name the described item provided f6 options. Continue 7-7-gifsodss bilabial and tip aleolar sounds.     Planned Services [facility] [id number] Speech Lang Voice Comm and/or Auditory Proc (Treatment of speech, language, voice, communication and/or hearing processing disorder)                 OBJECTIVE MEASUREMENTS/DATA:    OM: FCM/Voice/Neuro    Outcome Measures - 03/12/24 2012        Functional Communication Measures    FCM: Motor Speech 4-->Level 4     FCM: Reading 4-->Level 4     FCM: Spoken Language, Comprehension 5-->Level 5     FCM: Spoken Language, Expression 4-->Level 4                 Outcome Measures        11/14/2023    10:15 12/14/2023    17:09 2/8/2024    17:02 3/12/2024    20:12   SLP Outcome Measures   FCM: Motor Speech 3-->Level 3       approaching 4 4-->Level 4 4-->Level 4 4-->Level 4    FCM: Reading   4-->Level 4 4-->Level 4   FCM: Spoken Language, Comprehension 4-->Level 4 5-->Level 5 5-->Level 5 5-->Level 5   FCM: Spoken Language, Expression 3-->Level 3       approaching 4 4-->Level 4 4-->Level 4 4-->Level 4       Today's Treatment::     Education provided:  Yes: See treatment log for details of education provided  Methods: Discussion and Demonstration  Readiness: acceptance  Response: Needs reinforcement and Verbalizes understanding     Today's Treatment:  LANGUAGE SLP FLOW SHEET    SKILL AREA CURRENT SESSION   SPEECH THERAPY: LANGUAGE  CPT 17137    MOTOR SPEECH  Pt will produce 1-3 syllable words   with /p,b,m,w,t,d,n,s,l/ in initial position 80% of presentations.  PN: 3/12/2024  Continue   Pt will repeat familiar greetings and common exchanges provided a direct model, 80% of presentations.  PN: 3/12/2024  Continue   SPOKEN LANG COMP  Pt will demonstrate understanding of complex sentences and short paragraphs with 80% accuracy to y/n questions.    WALC1: p 191-192 etc PN: 3/12/2024  MET-Consider increased accuracy to 90% and/or increased length of paragraph   Pt will follow 2 step commands with 4 components, 80% of presentations. PN: 3/12/2024  Take data   SPOKEN LANG EXP  Provided a descriptive cue and a F6 pictures written words, pt will name the described item, 80% of trials, min A  PN: 3/12/2024  Continue   Given simple picture cue pt will produce simple sentences of 3-4 words with simplified grammatical form and approximated content words to convey meaning, Min A, 80% of presentations. PN: 3/12/2024  Continue- reduce word bank   READING  Pt will match picture to word F4 90% of presentations. PN: 3/12/2024   MET, revise to picture to phrase/sentence   Pt will select picture/word for sentence completion, 80% PN: 3/12/2024  Continue   Pt will demonstrate reading comprehension of sentences to brief paragraph- answering multiple choice questions, 80% min A PN: 3/12/2024  Take data    Education/Strategy Training    Other              Goals Addressed                 This Visit's Progress    • SLP Motor Goals          Goals Short-Long Time Frame Result Comment   MOTOR SPEECH  Current:4  Goal:5-6  SPOKEN LANG COMP  Current:5  Goal:  6  SPOKEN LANG EXP:  Current:4   Goal: 5  READING:   Current:4  Goal: 5 LTG 12w  Cont  PN: 3/12/2024  MOTOR SPEECH: 4  SPOKEN LANG COMP: 5  SPOKEN LANG EXP:4  READIN   Pt will improve Aphasia Severity Rating Scale from 2/5 to >/=3/5 LTG 12 w  Improving  Aphasia Severity Ratin-3/5-         MOTOR SPEECH  Pt will produce 1-3 syllable words   with /p,b,m,w,t,d,n,s,l/ in initial position 80% of presentations.     STG 6 w  Improving  PN: 3/12/2024  1-3 syllable approximation provided visual cues and first phoneme.  /t,d/ initial sound continues to be difficult, but noted in spontaneous speech intermittently with accurate production   Pt will repeat familiar greetings and common exchanges provided a direct model, 80% of presentations.     STG 6 w  improving PN: 3/12/2024  Improving with familiar and  Frequently used. Continue to expand field of options.    SPOKEN LANG COMP  Pt will demonstrate understanding of complex sentences and short paragraphs with 80% accuracy to y/n questions.. STG 4 w  MET PN: 3/12/2024  Full length of paragraph: 80%    Consider increased accuracy to 90% and/or increased length of paragraph    Pt will follow 2 step commands with 4 components, 80% of presentations.     STG 6 w       SPOKEN LANG EXP  Provided a descriptive cue and a F6 pictures written words, pt will name the described item, 80% of trials, min A  STG 4 w  improving  PN: 3/12/2024  F6 written words:verbal description- pt read word 67% (I) then 92% Min to mod A with written description.     F6 Pictures: verbal description : matched 100%, Mod A required initial phoneme to approximate word    Given simple picture cue pt will produce simple sentences of 3-4 words with simplified  grammatical form and approximated content words to convey meaning, Min A, 80% of presentations.  STG 6 w  continue, reduce cues  PN: 3/12/2024  Provided word bank to formulate syntax, 2 nouns, non-reversible: 10/10 100%   READING  Pt will match picture to word F4 90% of presentations.    STG 4 w  MET, revise to picture to phrase/sentence  PN: 3/12/2024  F4: Match words to pictures:     Match picture to short phrase - 6/10, 8/10,       Pt will select picture/word for sentence completion, 80%    STG 6w  Improving  PN: 3/12/2024  67%   Pt will demonstrate reading comprehension of sentences to brief paragraph- answering multiple choice questions, 80% min A STG 8 w       Aphasia Severity Ratin-3/5-     2- Conversation about familiar subjects is possible with help from the listener. There are frequent failures to convey the idea, but the patient shares the burden of communication.       3- The patient can discuss almost all everyday problems with little or no assistance.  Reduction of speech and/or comprehension, however, makes conversation about certain material difficult or impossible.      FCM MOTOR SPEECH  L4: In simple structured conversation with familiar communication partners, the individual can produce simple words and phrases intelligibly.  The individual usually requires moderate cueing in order to produce simple sentences intelligibly, although accuracy may vary.         FCM: Spoken Language Expression   L4: The individual is successfully able to initiate communication using spoken language in simple, structured conversation in routine daily activities with familiar communication partners.  The individual usually requires moderate cueing, but is able to demonstrate use of simple sentences (ie, semantics, syntax, and morphology) and rarely uses complex sentences/messages.        FCM: Spoken Language Comprehension  L5: The individual is able to understand communication in structured conversations with  both familiar and unfamiliar communication partners.  The individual occasionally requires minimal cueing to understand more complex sentences/messages.  The individual occasionally initiates the use of compensatory strategies when encountering difficulty.       FCM: Reading  L4: The individual reads words and phrases related to routine daily activities, and words that are less familiar, longer, and more complex.  The individual usually requires moderate cueing to read sentences of approximately 5 and 7 words in length.

## 2024-03-12 NOTE — TELEPHONE ENCOUNTER
DOMINGO/SD: NEERAJ     I called the patient back-   I told her friend Samara that no labs are needed prior to the patient's appointment. She verbalized understanding.

## 2024-03-13 ENCOUNTER — TELEPHONE (OUTPATIENT)
Dept: SCHEDULING | Facility: CLINIC | Age: 79
End: 2024-03-13
Payer: MEDICARE

## 2024-03-13 NOTE — TELEPHONE ENCOUNTER
Pt's friend Thelma chappell wants to know if pt's appt time can be moved up earlier pt is scheduled for an echo that day and wants to know if appt can be scheduled a little after the echo     P: [phone]

## 2024-03-13 NOTE — TELEPHONE ENCOUNTER
Pt's friend Thelma chappell said pt is scheduled for an echo on 3/15 and wants to know if this is okay if pt is seen Dr. Salter tomorrow     P: [phone]

## 2024-03-13 NOTE — TELEPHONE ENCOUNTER
I called the pt and left a VM advising that she should still come to her appt w/ Dr. Salter tomorrow and I advised that we are looking to see if it's a possibility for her to come for an echo before her 2 pm appt. I advised we won't know until tomorrow though.

## 2024-03-13 NOTE — TELEPHONE ENCOUNTER
Tanisha had asked me earlier today-I'm fine for her echo to stay as is. She is seeing me Thursday and Dr. Obregon Friday with echo scheduled Friday.

## 2024-03-14 ENCOUNTER — OFFICE VISIT (OUTPATIENT)
Dept: CARDIOLOGY | Facility: CLINIC | Age: 79
End: 2024-03-14
Payer: MEDICARE

## 2024-03-14 VITALS
HEART RATE: 65 BPM | OXYGEN SATURATION: 98 % | SYSTOLIC BLOOD PRESSURE: 132 MMHG | DIASTOLIC BLOOD PRESSURE: 70 MMHG | BODY MASS INDEX: 16.88 KG/M2 | WEIGHT: 114 LBS | HEIGHT: 69 IN

## 2024-03-14 DIAGNOSIS — E78.5 DYSLIPIDEMIA: ICD-10-CM

## 2024-03-14 DIAGNOSIS — I51.3 LV (LEFT VENTRICULAR) MURAL THROMBUS: ICD-10-CM

## 2024-03-14 DIAGNOSIS — I71.43 INFRARENAL ABDOMINAL AORTIC ANEURYSM (AAA) WITHOUT RUPTURE (CMS/HCC): ICD-10-CM

## 2024-03-14 DIAGNOSIS — I25.10 CORONARY ARTERY DISEASE INVOLVING NATIVE CORONARY ARTERY OF NATIVE HEART WITHOUT ANGINA PECTORIS: ICD-10-CM

## 2024-03-14 DIAGNOSIS — I50.42 CHRONIC COMBINED SYSTOLIC AND DIASTOLIC CHF (CONGESTIVE HEART FAILURE) (CMS/HCC): Primary | ICD-10-CM

## 2024-03-14 DIAGNOSIS — I63.9 CEREBROVASCULAR ACCIDENT (CVA), UNSPECIFIED MECHANISM (CMS/HCC): ICD-10-CM

## 2024-03-14 PROCEDURE — G8752 SYS BP LESS 140: HCPCS | Performed by: INTERNAL MEDICINE

## 2024-03-14 PROCEDURE — G8754 DIAS BP LESS 90: HCPCS | Performed by: INTERNAL MEDICINE

## 2024-03-14 PROCEDURE — 99214 OFFICE O/P EST MOD 30 MIN: CPT | Performed by: INTERNAL MEDICINE

## 2024-03-14 PROCEDURE — 93000 ELECTROCARDIOGRAM COMPLETE: CPT | Performed by: INTERNAL MEDICINE

## 2024-03-14 RX ORDER — ESCITALOPRAM OXALATE 5 MG/1
5 TABLET ORAL DAILY
Qty: 90 TABLET | Refills: 3
Start: 2024-03-14 | End: 2024-10-15 | Stop reason: SDDI

## 2024-03-14 NOTE — LETTER
March 14, 2024     Sabrina Caruso MD  [address] [facility] [address]    Patient: Jennifer Sams  YOB: 1945  Date of Visit: 3/14/2024      Dear Dr. Caruso:    Thank you for referring Jennifer Sams to me for evaluation. Below are my notes for this consultation.    If you have questions, please do not hesitate to call me. I look forward to following your patient along with you.         Sincerely,        Jermaine Salter MD        CC: MD Gaetano Allen Steven M, MD  3/14/2024 11:48 PM  Sign when Signing Visit     Cardiology Note     3/14/2024            HPI   Jennifer Sams is a 79 y.o. woman who presents for follow up for dilated cardiomyopathy, today, 3/14/2024.    As you know, she has a history of a mild ischemic cardiomyopathy with a very recent reassuring left and right heart cath, heart block with pacemaker placed 2019 and CAD who has had significant dyspnea on exertion. She was evaluated at the [facility], then here by Dr. Tidwell from a pulmonary standpoint but a pulmonary cause was not found and PFTs are normal. Dr. Carolina previously made pacemaker setting adjustments with regards to AV delay which helped somewhat initially but not completely. She previously followed with Dr. Courtney who retired, and now Dr. Moncada who did a left and right heart cath in February of 2021 found low-normal filling pressures and no residual severe CAD.  Unfortunately, she suffered a large stroke in June 2023.  An echocardiogram at that time showed an LV apical thrombus and she has been on systemic anticoagulation.  She has made good progress but left with some aphasia and right-sided weakness.    Since her last visit in September, she reports good cardiovascular stability.  She has had interim follow-up with her neurologist and PCP. She is frustrated by continued stroke deficits.  Her friend and caregiver report that she has made some further improvement since last visit.  She  continues with rehab and therapy.  She feels lightheaded at times despite normal pressure. She c/o hair loss, for which her PCP sent labs that are unrevealing.  She continues to struggle with eating enough.  She lost quite a bit of weight since her stroke.  Her PCP has recommended escitalopram for anxiety and depression but she has not started it.  I recommended it for her. She denies exertional chest pain, dyspnea at rest, orthopnea, PND, edema or abdominal bloating. She has chronic back pain. No further syncopal episodes.      Past Medical History:   Diagnosis Date   • AAA (abdominal aortic aneurysm) (CMS/[facility])    • Arthritis    • Elevated blood pressure reading without diagnosis of hypertension 04/19/2019   • Epistaxis 01/06/2020   • GERD (gastroesophageal reflux disease) 04/19/2019   • Glaucoma 04/19/2019   • Heart murmur    • Hiatal hernia    • History of hip replacement, total, right 04/19/2019    Right hip 9/ 2016 and revision 2017    • History of rheumatic fever as a child 04/19/2019   • Hypercholesterolemia 04/19/2019   • Ischemic cardiomyopathy 05/09/2019   • Kidney cysts    • MI (myocardial infarction) (CMS/[facility])    • Osteoarthritis of multiple joints 04/19/2019   • Osteoporosis    • Pacemaker 12/09/2019   • Raynaud's disease 04/19/2019   • RSD (reflex sympathetic dystrophy) 04/19/2019    Of left foot   • SOB (shortness of breath) 12/10/2019   • Status post insertion of drug eluting coronary artery stent 05/09/2019   • Stroke (CMS/[facility])      Past Surgical History:   Procedure Laterality Date   • CHOLECYSTECTOMY OPEN     • CORONARY ANGIOPLASTY WITH STENT PLACEMENT  04/29/2019    of LAD   • CORONARY ANGIOPLASTY WITH STENT PLACEMENT  04/30/2019    of RCA   • DILATION AND CURETTAGE OF UTERUS     • EYE SURGERY      RIGHT CATARACT   • FOOT SURGERY Left    • JOINT REPLACEMENT Right 09/2016    total hip   • REVISION TOTAL HIP ARTHROPLASTY Right 2017   • TONSILLECTOMY         SOCIAL HISTORY  Social History  "    Tobacco Use   • Smoking status: Never   • Smokeless tobacco: Never   Vaping Use   • Vaping Use: Never used   Substance Use Topics   • Alcohol use: Not Currently   • Drug use: No     Family Status   Relation Name Status   • Bio Mother   at age 66   • Bio Father   at age 77        pulmonary fibrosis   • MGM     • MGF     • PGM   at age 90        natural causes   • PGF          ALLERGIES  Ace inhibitors, Atorvastatin, Brilinta [ticagrelor], Codeine, Dilaudid [hydromorphone], Morphine sulfate, Onion, and Oxycodone      Outpatient Encounter Medications as of 3/14/2024:   •  apixaban (ELIQUIS) 5 mg tablet, Take 1 tablet (5 mg total) by mouth 2 (two) times a day Indications: treatment to prevent blood clots in chronic atrial fibrillation.  •  cyanocobalamin (VITAMIN B12) 100 mcg tablet, Take 1,000 mcg by mouth daily.  •  escitalopram (LEXAPRO) 5 mg tablet, Take 1 tablet (5 mg total) by mouth daily.  •  irbesartan (AVAPRO) 75 mg tablet, Take 1 tablet (75 mg total) by mouth daily.  •  pantoprazole (PROTONIX) 40 mg EC tablet, Take 1 tablet (40 mg total) by mouth daily.  •  rosuvastatin (CRESTOR) 20 mg tablet, Take 1 tablet (20 mg total) by mouth nightly.  •  [DISCONTINUED] trazodone (DESYREL) 50 mg tablet, Take 0.5 tablets (25 mg total) by mouth nightly. (Patient not taking: Reported on 3/14/2024)    ROS   As per HPI and otherwise negative.  Additionally, weight loss and back pain.  Otherwise all relevant systems are reviewed and are negative.    Objective   Visit Vitals  /70   Pulse 65   Ht 1.753 m (5' 9\")   Wt 51.7 kg (114 lb)   SpO2 98%   BMI 16.83 kg/m²       Wt Readings from Last 3 Encounters:   24 51.7 kg (114 lb)   24 51.3 kg (113 lb)   09/15/23 55.3 kg (122 lb)       Physical Exam  Vitals reviewed.   Constitutional:       Appearance: She is well-developed.   HENT:      Head: Normocephalic.   Eyes:      General: No scleral icterus.  Neck:      " "Vascular: No JVD.   Cardiovascular:      Rate and Rhythm: Normal rate and regular rhythm.      Heart sounds: Normal heart sounds.   Pulmonary:      Breath sounds: Normal breath sounds.   Skin:     General: Skin is warm and dry.   Neurological:      Mental Status: She is alert and oriented to person, place, and time.      Comments: Right sided weakness. Moderate expressive aphasia   Psychiatric:         Judgment: Judgment normal.         Lab Results   Component Value Date    GLUCOSE 90 07/10/2023    CALCIUM 8.9 07/10/2023     07/10/2023    K 4.2 07/10/2023    CO2 27 07/10/2023     07/10/2023    BUN 11 07/10/2023    CREATININE 0.8 07/10/2023     Lab Results   Component Value Date    ALT 9 07/02/2023    AST 22 07/02/2023    ALKPHOS 51 07/02/2023    BILITOT 0.8 07/02/2023     Lab Results   Component Value Date    WBC 5.12 07/10/2023    HGB 15.0 07/10/2023    HCT 45.0 07/10/2023    MCV 93.8 07/10/2023     07/10/2023     Lab Results   Component Value Date    TSH 2.94 04/27/2022     Lab Results   Component Value Date    CHOL 178 06/26/2023    CHOL 163 03/31/2023    CHOL 156 02/04/2022     Lab Results   Component Value Date    HDL 71 06/26/2023    HDL 80 03/31/2023    HDL 71 02/04/2022     Lab Results   Component Value Date    LDLCALC 88 06/26/2023    LDLCALC 66 03/31/2023    LDLCALC 66 02/04/2022     Lab Results   Component Value Date    TRIG 93 06/26/2023    TRIG 87 03/31/2023    TRIG 107 02/04/2022     No results found for: \"CHOLHDL\"  Lab Results   Component Value Date    HGBA1C 5.4 06/26/2023     Labs December 27, 2023:              Labs 2/6/24:       EKG    Performed on 3/14/2024 and personally reviewed:  Sinus at 65 bpm   V-paced    Imaging    Echo 11/29/19:  Technically limited study no gross chamber enlargement  Imaging done just to exclude pericardial effusion Limited echo study  No regional wall motion abnormality preserved ejection fraction 55 to 60%  Aortic sclerosis  Mitral tricuspid valves " appear normal  Prominent anterior fat pad no evidence of pericardial effusion or tamponade    CXR 1/17/2020:  No active disease.    PFTs 12/24/20:        LHC/RHC 2/12/21:  1. Normal right and left heart filling pressures (RA 3, PA 20/5 with mean of 11 mmHg, and PCW 6 mm Hg).  2. Patent prior stents in the mid and distal LAD.  3. 50% ostial Cx stenosis, not functionally significant by iFR (0.95). Patent proximal Cx stent.  4. Patent mid and distal RCA stents.    Echo 2/17/21:  Left Ventricle: Normal ventricle size. Normal wall thickness. Mildly decreased systolic function. Estimated EF 45- 50%. No regional wall motion abnormalities. Grade I diastolic dysfunction. Diastolic inflow pattern consistent with impaired relaxation. Normal left atrial pressure.  Aortic Valve: Tricuspid valve. Sclerotic leaflets.  Sinuses of Valsalva normal-sized. Ascending aorta normal-sized.  Mild mitral annular calcification.  Left Atrium: Normal-sized atrium. Normal doppler flow of pulmonary veins.  Right Ventricle: Normal ventricle size. Low normal systolic function. Pacer wire present. Normal wall thickness.  Right Atrium: Normal-sized atrium. Pacer/ICD lead present.  Tricuspid Valve: Normal structure. Mild regurgitation. Estimated RVSP = 25 mmHg. The regurgitation jet is central. Normal hepatic vein systolic flow.  IVC/SVC: Normal-sized IVC. IVC collapses >50% during inspiration. Normal hepatic vein flow.  Pericardium: Normal structure.     CPT July 2021  CARDIOPULMONARY GAS EXCHANGE:  The test was near-maximal as defined by a respiratory exchange ratio of 1.10(normal >1.10).  The peak VO2 was 14.7 cc/kg/min which is 62% predicted (normal >85%).  The anaerobic threshold was 39% predicted (normal >40%).  The peak minute ventilation was 43L/min yielding a normal breathing reserve of 52% (normal >30%).  The VE/VCO2 slope was 43(normal <34).  Resting spirometry showed an FVC of 98%, FEV1 of 99% consistent with normal  spirometry.     CONCLUSIONS:  By respiratory exchange ratio, the test was near maximal thus peak VO2 achieved may slightly underestimate her true exercise capacity.  Peak VO2 achieved was 14.7 cc/kg/min which is only 62% of predicted.  In combination with a low anaerobic threshold there is likely an element of deconditioning.  Her breathing reserve and spirometry suggest a cardiac rather than pulmonary limitation to exercise.  Suggest a regular exercise regimen to potentially increase functional capacity.     TTE 2-9-22  Left Ventricle: Normal ventricle size. Mild concentric left ventricular hypertrophy. Mild-to-moderately decreased systolic function. Estimated EF 40- 45%. LV SVI low at 28 mL/m² per beat. Basal and mid inferolateral akinesis, mid and apical anteroseptal akinesisGrade I diastolic dysfunction. Diastolic inflow pattern consistent with impaired relaxation. Normal left atrial pressure.  Tricuspid aortic valve. Sclerotic aortic valve leaflets.  Aorta: Sinuses of Valsalva normal-sized.  Normal leaflet structure of the mitral valve.  Normal-sized LA. Doppler flow of pulmonary veins not obtained.  Normal-sized RV. Mildly reduced RV systolic function. Pacemaker/ICD lead present in the RV. Normal RV wall thickness. Mid free wall akinetic.  Normal-sized RA. Pacemaker wire present in the RA.  Tricuspid Valve: Normal structure. Trace regurgitation. Estimated RVSP = 27 mmHg.  Pulmonic valve not well visualized.  IVC collapses <50% during inspiration.  Evidence of a prominent pericardial fat pad.    MyFaban 3-11-22  1. This is a very technically difficult study.  Patient showed excessive motion during image acquisition, imaged with arms at sides, and diaphragmatic and GI interference.   2. Regadenoson technetium tetrofosmin shows a small, fixed defect in the apical to mid inferior wall, suggestive of tissue attenuation, although scar cannot be excluded. There is no myocardium at risk.   3. EKG is non-diagnostic  due to ventricular pacing.  4. Gated SPECT imaging was unable to be obtained.   5. No prior study for comparison. Prior nuclear imaging was non-diagnostic, exercise testing.     Echo 9/13/2022:  •  Left Ventricle: Normal ventricle size. Normal wall thickness. Mildly decreased systolic function. Estimated EF 45-50%. Basal and mid inferolateral akinesis, mid and apical anteroseptal/septal akinesis.  •  Aortic Valve: Valve not well seen but likely trileaflet.  Sclerotic leaflets. No regurgitation. No stenosis.  •  Aorta: Aortic root normal.  •  Right Ventricle: Ventricle not well visualized but probably normal size and at most mildly dilated. Catheter present. Normal systolic function.  •  Tricuspid Valve: Mild regurgitation. Estimated RVSP = 26 mmHg.  •  Pericardium: Small anterior pericardial effusion  •  Left Atrium: Normal sized atrium.  •  Right Atrium: Normal sized atrium.  •  Mitral Valve: No regurgitation.  •  Pulmonic Valve: Valve not well visualized. No regurgitation.  •  IVC/SVC: Normal sized inferior vena cava. Inferior vena cava collapses >50% during inspiration.  •  Compared with February 2022, no significant change.  •  I personally reviewed results with her in the office today.     Abdominal US 10/18/2022:  Aortic ectasia noted, measuring about 2.8 cm in maximal diameter    Echo 3/28/2023:  •  Left Ventricle: Normal ventricle size. Normal wall thickness. Mildly decreased systolic function. Estimated EF 45-50%. Basal and mid inferolateral akinesis, mid and apical anteroseptal/septal akinesis.  •  Aortic Valve: Valve not well seen but likely trileaflet.  Sclerotic leaflets. No regurgitation. No stenosis.  •  Aorta: Aortic root normal.  •  Right Ventricle: Ventricle not well visualized but probably normal size and at most mildly dilated. Catheter present. Normal systolic function.  •  Tricuspid Valve: Mild regurgitation. Estimated RVSP = 26 mmHg.  •  Pericardium: Small anterior pericardial effusion  •   Left Atrium: Normal sized atrium.  •  Right Atrium: Normal sized atrium.  •  Mitral Valve: No regurgitation.  •  Pulmonic Valve: Valve not well visualized. No regurgitation.  •  IVC/SVC: Normal sized inferior vena cava. Inferior vena cava collapses >50% during inspiration.  •  Compared with February 2022, no significant change.  •  I personally reviewed results with her in the office today.    Echo 6/27/2023  Left ventricle with normal dimensions and regional contraction abnormalities consistent with CAD in the LAD distribution: Localized apical infarction with aneurysm formation.  Paradoxical septal movement is consistent with ventricular pacing.  There is moderate left ventricular systolic and mild diastolic dysfunction.  LVEF 43%  Left ventricular apical thrombus  Left atrial pressure 14 mmHg  There is a visual suggestion of mild aortic stenosis. (Doppler assessment of the aortic valve is suboptimal)  Mild mitral regurgitation  Normal right ventricle dimensions and systolic function: TAPSE 1.84 cm  Mild tricuspid regurgitation  PASP 34 mmHg  Normal left atrial volume index  No pericardial effusion or vegetations  AS-V pacing  Technically difficult study: No parasternal imaging obtained  This study was performed with Definity contrast to optimize evaluation of regional and global left ventricular function  No significant change from 3/28/2023 except for current demonstration of left ventricular apical thrombus      ASSESSMENT AND PLAN    1. Chronic systolic and diastolic CHF, ACC Stage C, NYHA class 3  No extra volume on exam and a right heart cath showed low filling pressures, so diuretics are not indicated.  She is concerned about potential cost of Entresto and given the dizziness described I will avoid anything with a diuretic effect for now thus the decision to go with valsartan or irbesartan to start.  I prescribed at last visit but she has not started it.  She believes it will increase her risk of  nosebleeds, but I reassured her it does not.  After long discussion in the office today she says she is willing to start it.  She is getting a repeat echocardiogram tomorrow.    2. CAD.  non-STEMI 4/26/19, three-vessel CAD; status post FRANK x 2 LAD 4/27/19 + FRANK x 4 RCA 4/29/19 + FRANK x 1 LCX OM2 5/2/19  She will continue aspirin. She also will continue statin therapy.  Dr. Moncada attempted to keep her on dual antiplatelet therapy given her numerous stents but ultimately could not be tolerated with incessant nosebleeds.  Now on Eliquis since her stroke.  She remains concerned about recurrent nosebleeds that thankfully has not had it.    3. Dyslipidemia  She should continue with rosuvastatin therapy.  She admits to a terrible diet as well as tells me she is not going to change that.  Her lipids were controlled on rosuvastatin. Higher on last check at 110 but she is resistant to adding meds, and I have picked my battles today of asking her to start Lexapro and Irbesartan which were previously prescribed but she didn't start yet.    4. Discoloration of toes, concern for peripheral vascular disease  She describes what could be Raynaud's of her feet but does not get it in her hands.  She does have reduced pulses in her left foot.  I therefore have ordered ankle-brachial index and ultrasound to look at her arterial circulation. She was found to have small vessel disease.     5. LV apical thrombus  Found when CVA occurred June 2023. Should stay on lifelong anticoagulation, continue Eliquis,.     6.  Abdominal aortic aneurysm.  2.9 cm on imaging in 2018.  A follow-up ultrasound a year ago showed no significant growth.    7.  Mobitz 2 AV block status post pacemaker 2019.  She follows with Dr. Carolina and is seeing him tomorrow.    8. CVA  Found to have LV apical thrombus, so likely embolic. Should stay on Eliquis.    9.  Depression and anxiety.  She has been prescribed escitalopram for this by her PCP, but Jennifer and her  friend reports she did not want to start it until talking with me.  We had a long discussion about the benefits of it.  She is worried it will be sedating and I reassured her it is not that type of anxiety medicine.  Long discussion about benefits and we discussed that approximately 50% of stroke victims end up suffering from depression.  I have strongly recommended she start it as recommended by her PCP.     Thank you for allowing me to participate in the care of this patient.  I reviewed interim records from her primary physician as well as neurologist.  I reviewed her labs.  She is having an echocardiogram tomorrow and I will review that.  Hopefully she now will start escitalopram and irbesartan.  I will plan to see her back in 6 months.  Please not hesitate to contact me with any questions or concerns.     Sincerely,    Jermaine Salter MD  3/14/2024

## 2024-03-14 NOTE — PATIENT INSTRUCTIONS
Return visit in about 6 months.  Recommend starting irbesartan for your heart and escitalopram for your anxiety.

## 2024-03-14 NOTE — TELEPHONE ENCOUNTER
I called and s/w the pt and advised Samara (on [employer]) that Dr. Salter is OK w/ Jennifer having the echo tomorrow, since she has an appt w/ Dr. Carolina anyways. I advised we tried to get her in today but the timing didn't work out well, she would have to wait a long time and it would be too close to her appt. She verbalized understanding.

## 2024-03-14 NOTE — TELEPHONE ENCOUNTER
Called [facility] / Thelma told them waiting for cx. Will check if a early cancel date comes in.

## 2024-03-14 NOTE — PROGRESS NOTES
Cardiology Note     3/14/2024            HPI   Jennifer Sams is a 79 y.o. woman who presents for follow up for dilated cardiomyopathy, today, 3/14/2024.    As you know, she has a history of a mild ischemic cardiomyopathy with a very recent reassuring left and right heart cath, heart block with pacemaker placed 2019 and CAD who has had significant dyspnea on exertion. She was evaluated at the [facility], then here by Dr. Tidwell from a pulmonary standpoint but a pulmonary cause was not found and PFTs are normal. Dr. Carolina previously made pacemaker setting adjustments with regards to AV delay which helped somewhat initially but not completely. She previously followed with Dr. Courtney who retired, and now Dr. Moncada who did a left and right heart cath in February of 2021 found low-normal filling pressures and no residual severe CAD.  Unfortunately, she suffered a large stroke in June 2023.  An echocardiogram at that time showed an LV apical thrombus and she has been on systemic anticoagulation.  She has made good progress but left with some aphasia and right-sided weakness.    Since her last visit in September, she reports good cardiovascular stability.  She has had interim follow-up with her neurologist and PCP. She is frustrated by continued stroke deficits.  Her friend and caregiver report that she has made some further improvement since last visit.  She continues with rehab and therapy.  She feels lightheaded at times despite normal pressure. She c/o hair loss, for which her PCP sent labs that are unrevealing.  She continues to struggle with eating enough.  She lost quite a bit of weight since her stroke.  Her PCP has recommended escitalopram for anxiety and depression but she has not started it.  I recommended it for her. She denies exertional chest pain, dyspnea at rest, orthopnea, PND, edema or abdominal bloating. She has chronic back pain. No further syncopal episodes.      Past Medical History:    Diagnosis Date   • AAA (abdominal aortic aneurysm) (CMS/[facility])    • Arthritis    • Elevated blood pressure reading without diagnosis of hypertension 2019   • Epistaxis 2020   • GERD (gastroesophageal reflux disease) 2019   • Glaucoma 2019   • Heart murmur    • Hiatal hernia    • History of hip replacement, total, right 2019    Right hip 2016 and revision 2017    • History of rheumatic fever as a child 2019   • Hypercholesterolemia 2019   • Ischemic cardiomyopathy 2019   • Kidney cysts    • MI (myocardial infarction) (CMS/[facility])    • Osteoarthritis of multiple joints 2019   • Osteoporosis    • Pacemaker 2019   • Raynaud's disease 2019   • RSD (reflex sympathetic dystrophy) 2019    Of left foot   • SOB (shortness of breath) 12/10/2019   • Status post insertion of drug eluting coronary artery stent 2019   • Stroke (CMS/HCC)      Past Surgical History:   Procedure Laterality Date   • CHOLECYSTECTOMY OPEN     • CORONARY ANGIOPLASTY WITH STENT PLACEMENT  2019    of LAD   • CORONARY ANGIOPLASTY WITH STENT PLACEMENT  2019    of RCA   • DILATION AND CURETTAGE OF UTERUS     • EYE SURGERY      RIGHT CATARACT   • FOOT SURGERY Left    • JOINT REPLACEMENT Right 2016    total hip   • REVISION TOTAL HIP ARTHROPLASTY Right 2017   • TONSILLECTOMY         SOCIAL HISTORY  Social History     Tobacco Use   • Smoking status: Never   • Smokeless tobacco: Never   Vaping Use   • Vaping Use: Never used   Substance Use Topics   • Alcohol use: Not Currently   • Drug use: No     Family Status   Relation Name Status   • Bio Mother   at age 66   • Bio Father   at age 77        pulmonary fibrosis   • MGM     • MGF     • PGM   at age 90        natural causes   • PGF          ALLERGIES  Ace inhibitors, Atorvastatin, Brilinta [ticagrelor], Codeine, Dilaudid [hydromorphone], Morphine sulfate, Onion, and  "Oxycodone      Outpatient Encounter Medications as of 3/14/2024:   •  apixaban (ELIQUIS) 5 mg tablet, Take 1 tablet (5 mg total) by mouth 2 (two) times a day Indications: treatment to prevent blood clots in chronic atrial fibrillation.  •  cyanocobalamin (VITAMIN B12) 100 mcg tablet, Take 1,000 mcg by mouth daily.  •  escitalopram (LEXAPRO) 5 mg tablet, Take 1 tablet (5 mg total) by mouth daily.  •  irbesartan (AVAPRO) 75 mg tablet, Take 1 tablet (75 mg total) by mouth daily.  •  pantoprazole (PROTONIX) 40 mg EC tablet, Take 1 tablet (40 mg total) by mouth daily.  •  rosuvastatin (CRESTOR) 20 mg tablet, Take 1 tablet (20 mg total) by mouth nightly.  •  [DISCONTINUED] trazodone (DESYREL) 50 mg tablet, Take 0.5 tablets (25 mg total) by mouth nightly. (Patient not taking: Reported on 3/14/2024)    ROS   As per HPI and otherwise negative.  Additionally, weight loss and back pain.  Otherwise all relevant systems are reviewed and are negative.    Objective   Visit Vitals  /70   Pulse 65   Ht 1.753 m (5' 9\")   Wt 51.7 kg (114 lb)   SpO2 98%   BMI 16.83 kg/m²       Wt Readings from Last 3 Encounters:   03/14/24 51.7 kg (114 lb)   02/12/24 51.3 kg (113 lb)   09/15/23 55.3 kg (122 lb)       Physical Exam  Vitals reviewed.   Constitutional:       Appearance: She is well-developed.   HENT:      Head: Normocephalic.   Eyes:      General: No scleral icterus.  Neck:      Vascular: No JVD.   Cardiovascular:      Rate and Rhythm: Normal rate and regular rhythm.      Heart sounds: Normal heart sounds.   Pulmonary:      Breath sounds: Normal breath sounds.   Skin:     General: Skin is warm and dry.   Neurological:      Mental Status: She is alert and oriented to person, place, and time.      Comments: Right sided weakness. Moderate expressive aphasia   Psychiatric:         Judgment: Judgment normal.         Lab Results   Component Value Date    GLUCOSE 90 07/10/2023    CALCIUM 8.9 07/10/2023     07/10/2023    K 4.2 " "07/10/2023    CO2 27 07/10/2023     07/10/2023    BUN 11 07/10/2023    CREATININE 0.8 07/10/2023     Lab Results   Component Value Date    ALT 9 07/02/2023    AST 22 07/02/2023    ALKPHOS 51 07/02/2023    BILITOT 0.8 07/02/2023     Lab Results   Component Value Date    WBC 5.12 07/10/2023    HGB 15.0 07/10/2023    HCT 45.0 07/10/2023    MCV 93.8 07/10/2023     07/10/2023     Lab Results   Component Value Date    TSH 2.94 04/27/2022     Lab Results   Component Value Date    CHOL 178 06/26/2023    CHOL 163 03/31/2023    CHOL 156 02/04/2022     Lab Results   Component Value Date    HDL 71 06/26/2023    HDL 80 03/31/2023    HDL 71 02/04/2022     Lab Results   Component Value Date    LDLCALC 88 06/26/2023    LDLCALC 66 03/31/2023    LDLCALC 66 02/04/2022     Lab Results   Component Value Date    TRIG 93 06/26/2023    TRIG 87 03/31/2023    TRIG 107 02/04/2022     No results found for: \"CHOLHDL\"  Lab Results   Component Value Date    HGBA1C 5.4 06/26/2023     Labs December 27, 2023:              Labs 2/6/24:       EKG    Performed on 3/14/2024 and personally reviewed:  Sinus at 65 bpm   V-paced    Imaging    Echo 11/29/19:  Technically limited study no gross chamber enlargement  Imaging done just to exclude pericardial effusion Limited echo study  No regional wall motion abnormality preserved ejection fraction 55 to 60%  Aortic sclerosis  Mitral tricuspid valves appear normal  Prominent anterior fat pad no evidence of pericardial effusion or tamponade    CXR 1/17/2020:  No active disease.    PFTs 12/24/20:        LHC/RHC 2/12/21:  1. Normal right and left heart filling pressures (RA 3, PA 20/5 with mean of 11 mmHg, and PCW 6 mm Hg).  2. Patent prior stents in the mid and distal LAD.  3. 50% ostial Cx stenosis, not functionally significant by iFR (0.95). Patent proximal Cx stent.  4. Patent mid and distal RCA stents.    Echo 2/17/21:  · Left Ventricle: Normal ventricle size. Normal wall thickness. Mildly " decreased systolic function. Estimated EF 45- 50%. No regional wall motion abnormalities. Grade I diastolic dysfunction. Diastolic inflow pattern consistent with impaired relaxation. Normal left atrial pressure.  · Aortic Valve: Tricuspid valve. Sclerotic leaflets.  · Sinuses of Valsalva normal-sized. Ascending aorta normal-sized.  · Mild mitral annular calcification.  · Left Atrium: Normal-sized atrium. Normal doppler flow of pulmonary veins.  · Right Ventricle: Normal ventricle size. Low normal systolic function. Pacer wire present. Normal wall thickness.  · Right Atrium: Normal-sized atrium. Pacer/ICD lead present.  · Tricuspid Valve: Normal structure. Mild regurgitation. Estimated RVSP = 25 mmHg. The regurgitation jet is central. Normal hepatic vein systolic flow.  · IVC/SVC: Normal-sized IVC. IVC collapses >50% during inspiration. Normal hepatic vein flow.  · Pericardium: Normal structure.     CPT July 2021  CARDIOPULMONARY GAS EXCHANGE:  1. The test was near-maximal as defined by a respiratory exchange ratio of 1.10(normal >1.10).  2. The peak VO2 was 14.7 cc/kg/min which is 62% predicted (normal >85%).  3. The anaerobic threshold was 39% predicted (normal >40%).  4. The peak minute ventilation was 43L/min yielding a normal breathing reserve of 52% (normal >30%).  5. The VE/VCO2 slope was 43(normal <34).  6. Resting spirometry showed an FVC of 98%, FEV1 of 99% consistent with normal spirometry.     CONCLUSIONS:  1. By respiratory exchange ratio, the test was near maximal thus peak VO2 achieved may slightly underestimate her true exercise capacity.  2. Peak VO2 achieved was 14.7 cc/kg/min which is only 62% of predicted.  In combination with a low anaerobic threshold there is likely an element of deconditioning.  3. Her breathing reserve and spirometry suggest a cardiac rather than pulmonary limitation to exercise.  4. Suggest a regular exercise regimen to potentially increase functional capacity.     TTE  2-9-22  · Left Ventricle: Normal ventricle size. Mild concentric left ventricular hypertrophy. Mild-to-moderately decreased systolic function. Estimated EF 40- 45%. LV SVI low at 28 mL/m² per beat. Basal and mid inferolateral akinesis, mid and apical anteroseptal akinesisGrade I diastolic dysfunction. Diastolic inflow pattern consistent with impaired relaxation. Normal left atrial pressure.  · Tricuspid aortic valve. Sclerotic aortic valve leaflets.  · Aorta: Sinuses of Valsalva normal-sized.  · Normal leaflet structure of the mitral valve.  · Normal-sized LA. Doppler flow of pulmonary veins not obtained.  · Normal-sized RV. Mildly reduced RV systolic function. Pacemaker/ICD lead present in the RV. Normal RV wall thickness. Mid free wall akinetic.  · Normal-sized RA. Pacemaker wire present in the RA.  · Tricuspid Valve: Normal structure. Trace regurgitation. Estimated RVSP = 27 mmHg.  · Pulmonic valve not well visualized.  · IVC collapses <50% during inspiration.  · Evidence of a prominent pericardial fat pad.    [employer] 3-11-22  1. This is a very technically difficult study.  Patient showed excessive motion during image acquisition, imaged with arms at sides, and diaphragmatic and GI interference.   2. Regadenoson technetium tetrofosmin shows a small, fixed defect in the apical to mid inferior wall, suggestive of tissue attenuation, although scar cannot be excluded. There is no myocardium at risk.   3. EKG is non-diagnostic due to ventricular pacing.  4. Gated SPECT imaging was unable to be obtained.   5. No prior study for comparison. Prior nuclear imaging was non-diagnostic, exercise testing.     Echo 9/13/2022:  •  Left Ventricle: Normal ventricle size. Normal wall thickness. Mildly decreased systolic function. Estimated EF 45-50%. Basal and mid inferolateral akinesis, mid and apical anteroseptal/septal akinesis.  •  Aortic Valve: Valve not well seen but likely trileaflet.  Sclerotic leaflets. No regurgitation.  No stenosis.  •  Aorta: Aortic root normal.  •  Right Ventricle: Ventricle not well visualized but probably normal size and at most mildly dilated. Catheter present. Normal systolic function.  •  Tricuspid Valve: Mild regurgitation. Estimated RVSP = 26 mmHg.  •  Pericardium: Small anterior pericardial effusion  •  Left Atrium: Normal sized atrium.  •  Right Atrium: Normal sized atrium.  •  Mitral Valve: No regurgitation.  •  Pulmonic Valve: Valve not well visualized. No regurgitation.  •  IVC/SVC: Normal sized inferior vena cava. Inferior vena cava collapses >50% during inspiration.  •  Compared with February 2022, no significant change.  •  I personally reviewed results with her in the office today.     Abdominal US 10/18/2022:  • Aortic ectasia noted, measuring about 2.8 cm in maximal diameter    Echo 3/28/2023:  •  Left Ventricle: Normal ventricle size. Normal wall thickness. Mildly decreased systolic function. Estimated EF 45-50%. Basal and mid inferolateral akinesis, mid and apical anteroseptal/septal akinesis.  •  Aortic Valve: Valve not well seen but likely trileaflet.  Sclerotic leaflets. No regurgitation. No stenosis.  •  Aorta: Aortic root normal.  •  Right Ventricle: Ventricle not well visualized but probably normal size and at most mildly dilated. Catheter present. Normal systolic function.  •  Tricuspid Valve: Mild regurgitation. Estimated RVSP = 26 mmHg.  •  Pericardium: Small anterior pericardial effusion  •  Left Atrium: Normal sized atrium.  •  Right Atrium: Normal sized atrium.  •  Mitral Valve: No regurgitation.  •  Pulmonic Valve: Valve not well visualized. No regurgitation.  •  IVC/SVC: Normal sized inferior vena cava. Inferior vena cava collapses >50% during inspiration.  •  Compared with February 2022, no significant change.  •  I personally reviewed results with her in the office today.    Echo 6/27/2023  • Left ventricle with normal dimensions and regional contraction abnormalities  consistent with CAD in the LAD distribution: Localized apical infarction with aneurysm formation.  Paradoxical septal movement is consistent with ventricular pacing.  There is moderate left ventricular systolic and mild diastolic dysfunction.  LVEF 43%  • Left ventricular apical thrombus  • Left atrial pressure 14 mmHg  • There is a visual suggestion of mild aortic stenosis. (Doppler assessment of the aortic valve is suboptimal)  • Mild mitral regurgitation  • Normal right ventricle dimensions and systolic function: TAPSE 1.84 cm  • Mild tricuspid regurgitation  • PASP 34 mmHg  • Normal left atrial volume index  • No pericardial effusion or vegetations  • AS-V pacing  • Technically difficult study: No parasternal imaging obtained  • This study was performed with Definity contrast to optimize evaluation of regional and global left ventricular function  • No significant change from 3/28/2023 except for current demonstration of left ventricular apical thrombus      ASSESSMENT AND PLAN    1. Chronic systolic and diastolic CHF, ACC Stage C, NYHA class 3  No extra volume on exam and a right heart cath showed low filling pressures, so diuretics are not indicated.  She is concerned about potential cost of Entresto and given the dizziness described I will avoid anything with a diuretic effect for now thus the decision to go with valsartan or irbesartan to start.  I prescribed at last visit but she has not started it.  She believes it will increase her risk of nosebleeds, but I reassured her it does not.  After long discussion in the office today she says she is willing to start it.  She is getting a repeat echocardiogram tomorrow.    2. CAD.  non-STEMI 4/26/19, three-vessel CAD; status post FRANK x 2 LAD 4/27/19 + FRANK x 4 RCA 4/29/19 + FRANK x 1 LCX OM2 5/2/19  She will continue aspirin. She also will continue statin therapy.  Dr. Moncada attempted to keep her on dual antiplatelet therapy given her numerous stents but ultimately  could not be tolerated with incessant nosebleeds.  Now on Eliquis since her stroke.  She remains concerned about recurrent nosebleeds that thankfully has not had it.    3. Dyslipidemia  She should continue with rosuvastatin therapy.  She admits to a terrible diet as well as tells me she is not going to change that.  Her lipids were controlled on rosuvastatin. Higher on last check at 110 but she is resistant to adding meds, and I have picked my battles today of asking her to start Lexapro and Irbesartan which were previously prescribed but she didn't start yet.    4. Discoloration of toes, concern for peripheral vascular disease  She describes what could be Raynaud's of her feet but does not get it in her hands.  She does have reduced pulses in her left foot.  I therefore have ordered ankle-brachial index and ultrasound to look at her arterial circulation. She was found to have small vessel disease.     5. LV apical thrombus  Found when CVA occurred June 2023. Should stay on lifelong anticoagulation, continue Eliquis,.     6.  Abdominal aortic aneurysm.  2.9 cm on imaging in 2018.  A follow-up ultrasound a year ago showed no significant growth.    7.  Mobitz 2 AV block status post pacemaker 2019.  She follows with Dr. Carolina and is seeing him tomorrow.    8. CVA  Found to have LV apical thrombus, so likely embolic. Should stay on Eliquis.    9.  Depression and anxiety.  She has been prescribed escitalopram for this by her PCP, but Jennifer and her friend reports she did not want to start it until talking with me.  We had a long discussion about the benefits of it.  She is worried it will be sedating and I reassured her it is not that type of anxiety medicine.  Long discussion about benefits and we discussed that approximately 50% of stroke victims end up suffering from depression.  I have strongly recommended she start it as recommended by her PCP.     Thank you for allowing me to participate in the care of this  patient.  I reviewed interim records from her primary physician as well as neurologist.  I reviewed her labs.  She is having an echocardiogram tomorrow and I will review that.  Hopefully she now will start escitalopram and irbesartan.  I will plan to see her back in 6 months.  Please not hesitate to contact me with any questions or concerns.     Sincerely,    Jermaine Salter MD  3/14/2024

## 2024-03-14 NOTE — PROGRESS NOTES
Speech-Language Pathology Progress Note      SLP PROGRESS NOTE FOR OUTPATIENT THERAPY    Patient: Jennifer Sams MRN: [id number]  : 1945 79 y.o.  Referring Physician: Ney Aparicio,*  Date of Visit: 3/12/2024      Certification Dates: 02/15/24 through 24    Recommended Frequency & Duration:  2 times/week for up to 3 months   PN: 2024    Diagnosis:   1. Cerebrovascular accident (CVA), unspecified mechanism (CMS/HCC)        Chief Complaints:  Chief Complaint   Patient presents with   • Speech Problem     Language       Precautions:   Precautions Comments: aphasia, apraxia, pacemaker    TODAY'S VISIT:    Session Time:  Start Time: 1505  Stop Time: 1600  Time Calculation (min): 55 min   General Information - 24 1505        Session Details    Document Type progress note     Mode of Treatment individual therapy        General Information    Onset of Illness/Injury or Date of Surgery 23     Referring Physician Dr. Aparicio     History of present illness/functional impairment  is a 79 y.o. female who presented to [facility] on  after she was not seen by her friends for a few days.  Ms. Smas lives alone and when she was found by EMS she was confused and combative.  In the emergency department she was aphasic with right-sided weakness and hypertensive to the 190 systolic.  CT scan of the head revealed an acute infarct in left MCA with mild bleeding.  Echo showed an EF of 43% and a bubble study was negative.  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 Pt transferred to  [facility] on 2023 Principal problem  Acute ischemic left MCA stroke (CMS/HCC). PMH AAA, heart block s/p cardiac pacemaker, glaucoma, hyperlipidemia, coronary artery disease, She was evaluated by speech therapy and cleared for a soft and bite-size diet with moderately thick liquids.  2023:  Diet SB6 with mildly thick liquids (MT2)  2023: Upgraded to EC7  with thin liquids.  Pt discharged home with 24 hr care and home health services recieving speech therapy for motor speech and aphasia.  Transferred to outpatient and evaluated at [facility] on 8/18/2024.  Ms. Sams presents for her second re-eval at [facility] 2/8/2024..     Precautions Comments aphasia, apraxia, pacemaker     Limitations/Impairments other (see comments)     Interventions Language impairment     Patient/Family/Caregiver Comments/Observations Pt continues to state concerns with reduced opportunity for communication due to living alone and limited time with friends and family.  Pt expressed disinterest in independent living environment.                Daily Falls Screen - 03/12/24 1505        Daily Falls Assessment    Patient reported fall since last visit No                Pain and Vitals - 03/12/24 1505        Pain Assessment    Currently in pain No/Denies                SLP - 03/12/24 1505        Speech Therapy    Speech Therapy Specialty Traditional Neuro Program ST        SLP Frequency and Duration    Frequency of treatment 2 times/week     Speech Duration 3 months     SLP Custom Frequency and Duration PN: 4/12/2024     SLP Cert From 02/15/24     SLP Cert To 05/08/24     Date SLP POC was sent to provider 02/08/24     Signed SLP Plan of Care received?  No   resend to Dr. Aparicio               Assessment - 03/14/24 0906        Assessment    Clinical Assessment Ms. Sams continues to actively engage in speech therapy with self directed goals for improved communication with consideration of expressive and receptive aphasia and motor speech difficulties.  Improvements in motor speech have lead to expansion of expressive language skills so that Ms. Sams is able to initiate conversation of an unknown topic and shared the burden with the listener for use of strategies and repair when there is communication breakdown.  Reading continues to improve resulting in the ability to utilize reading cues to further  support motor speech production, syntax and auditory comprehension.  Continuation of the plan of care is recommended.     Plan and Recommendations .  2 step commands? 4 components. Name the described item provided f6 options. Continue 1-6-[id number] bilabial and tip aleolar sounds.     [employer] CPT 83984 Speech Lang Voice Comm and/or Auditory Proc (Treatment of speech, language, voice, communication and/or hearing processing disorder)                 OBJECTIVE MEASUREMENTS/DATA:    OM: FCM/Voice/Neuro    Outcome Measures - 03/12/24 2012        Functional Communication Measures    FCM: Motor Speech 4-->Level 4     FCM: Reading 4-->Level 4     FCM: Spoken Language, Comprehension 5-->Level 5     FCM: Spoken Language, Expression 4-->Level 4                 Outcome Measures        11/14/2023    10:15 12/14/2023    17:09 2/8/2024    17:02 3/12/2024    20:12   SLP Outcome Measures   FCM: Motor Speech 3-->Level 3       approaching 4 4-->Level 4 4-->Level 4 4-->Level 4   FCM: Reading   4-->Level 4 4-->Level 4   FCM: Spoken Language, Comprehension 4-->Level 4 5-->Level 5 5-->Level 5 5-->Level 5   FCM: Spoken Language, Expression 3-->Level 3       approaching 4 4-->Level 4 4-->Level 4 4-->Level 4       Today's Treatment::     Education provided:  Yes: See treatment log for details of education provided  Methods: Discussion and Demonstration  Readiness: acceptance  Response: Needs reinforcement and Verbalizes understanding     Today's Treatment:  LANGUAGE SLP FLOW SHEET    SKILL AREA CURRENT SESSION   SPEECH THERAPY: LANGUAGE  [facility] [id number]    MOTOR SPEECH  Pt will produce 1-3 syllable words   with /p,b,m,w,t,d,n,s,l/ in initial position 80% of presentations.  PN: 3/12/2024  Continue   Pt will repeat familiar greetings and common exchanges provided a direct model, 80% of presentations.  PN: 3/12/2024  Continue   SPOKEN LANG COMP  Pt will demonstrate understanding of complex sentences and short paragraphs with 80% accuracy to  y/n questions.    WALC1: p [id number] etc PN: 3/12/2024  MET-Consider increased accuracy to 90% and/or increased length of paragraph   Pt will follow 2 step commands with 4 components, 80% of presentations. PN: 3/12/2024  Take data   SPOKEN LANG EXP  Provided a descriptive cue and a F6 pictures written words, pt will name the described item, 80% of trials, min A  PN: 3/12/2024  Continue   Given simple picture cue pt will produce simple sentences of 3-4 words with simplified grammatical form and approximated content words to convey meaning, Min A, 80% of presentations. PN: 3/12/2024  Continue- reduce word bank   READING  Pt will match picture to word F4 90% of presentations. PN: 3/12/2024   MET, revise to picture to phrase/sentence   Pt will select picture/word for sentence completion, 80% PN: 3/12/2024  Continue   Pt will demonstrate reading comprehension of sentences to brief paragraph- answering multiple choice questions, 80% min A PN: 3/12/2024  Take data   Education/Strategy Training    Other              Goals Addressed                 This Visit's Progress    • SLP Motor Goals          Goals Short-Long Time Frame Result Comment   MOTOR SPEECH  Current:4  Goal:5-6  SPOKEN LANG COMP  Current:5  Goal:  6  SPOKEN LANG EXP:  Current:4   Goal: 5  READING:   Current:4  Goal: 5 LTG 12w  Cont  PN: 3/12/2024  MOTOR SPEECH: 4  SPOKEN LANG COMP: 5  SPOKEN LANG EXP:4  READIN   Pt will improve Aphasia Severity Rating Scale from 2/5 to >/=3/5 LTG 12 w  Improving  Aphasia Severity Ratin-3/5-         MOTOR SPEECH  Pt will produce 1-3 syllable words   with /p,b,m,w,t,d,n,s,l/ in initial position 80% of presentations.     STG 6 w  Improving  PN: 3/12/2024  1-3 syllable approximation provided visual cues and first phoneme.  /t,d/ initial sound continues to be difficult, but noted in spontaneous speech intermittently with accurate production   Pt will repeat familiar greetings and common exchanges provided a direct model, 80%  of presentations.     STG 6 w  improving PN: 3/12/2024  Improving with familiar and  Frequently used. Continue to expand field of options.    SPOKEN LANG COMP  Pt will demonstrate understanding of complex sentences and short paragraphs with 80% accuracy to y/n questions.. STG 4 w  MET PN: 3/12/2024  Full length of paragraph: 80%    Consider increased accuracy to 90% and/or increased length of paragraph    Pt will follow 2 step commands with 4 components, 80% of presentations.     STG 6 w       SPOKEN LANG EXP  Provided a descriptive cue and a F6 pictures written words, pt will name the described item, 80% of trials, min A  STG 4 w  improving  PN: 3/12/2024  F6 written words:verbal description- pt read word 67% (I) then 92% Min to mod A with written description.     F6 Pictures: verbal description : matched 100%, Mod A required initial phoneme to approximate word    Given simple picture cue pt will produce simple sentences of 3-4 words with simplified grammatical form and approximated content words to convey meaning, Min A, 80% of presentations.  STG 6 w  continue, reduce cues  PN: 3/12/2024  Provided word bank to formulate syntax, 2 nouns, non-reversible: 10/10 100%   READING  Pt will match picture to word F4 90% of presentations.    STG 4 w  MET, revise to picture to phrase/sentence  PN: 3/12/2024  F4: Match words to pictures:     Match picture to short phrase - 6/10, 8/10,       Pt will select picture/word for sentence completion, 80%    STG 6w  Improving  PN: 3/12/2024  67%   Pt will demonstrate reading comprehension of sentences to brief paragraph- answering multiple choice questions, 80% min A STG 8 w       Aphasia Severity Ratin-3/5-     2- Conversation about familiar subjects is possible with help from the listener. There are frequent failures to convey the idea, but the patient shares the burden of communication.       3- The patient can discuss almost all everyday problems with little or no  assistance.  Reduction of speech and/or comprehension, however, makes conversation about certain material difficult or impossible.      FCM MOTOR SPEECH  L4: In simple structured conversation with familiar communication partners, the individual can produce simple words and phrases intelligibly.  The individual usually requires moderate cueing in order to produce simple sentences intelligibly, although accuracy may vary.         FCM: Spoken Language Expression   L4: The individual is successfully able to initiate communication using spoken language in simple, structured conversation in routine daily activities with familiar communication partners.  The individual usually requires moderate cueing, but is able to demonstrate use of simple sentences (ie, semantics, syntax, and morphology) and rarely uses complex sentences/messages.        FCM: Spoken Language Comprehension  L5: The individual is able to understand communication in structured conversations with both familiar and unfamiliar communication partners.  The individual occasionally requires minimal cueing to understand more complex sentences/messages.  The individual occasionally initiates the use of compensatory strategies when encountering difficulty.       FCM: Reading  L4: The individual reads words and phrases related to routine daily activities, and words that are less familiar, longer, and more complex.  The individual usually requires moderate cueing to read sentences of approximately 5 and 7 words in length.

## 2024-03-15 ENCOUNTER — HOSPITAL ENCOUNTER (OUTPATIENT)
Dept: CARDIOLOGY | Facility: HOSPITAL | Age: 79
Discharge: HOME | End: 2024-03-15
Attending: INTERNAL MEDICINE
Payer: MEDICARE

## 2024-03-15 ENCOUNTER — OFFICE VISIT (OUTPATIENT)
Dept: CARDIOLOGY | Facility: CLINIC | Age: 79
End: 2024-03-15
Payer: MEDICARE

## 2024-03-15 VITALS — DIASTOLIC BLOOD PRESSURE: 46 MMHG | SYSTOLIC BLOOD PRESSURE: 148 MMHG

## 2024-03-15 VITALS
WEIGHT: 114 LBS | BODY MASS INDEX: 16.88 KG/M2 | HEART RATE: 61 BPM | SYSTOLIC BLOOD PRESSURE: 152 MMHG | DIASTOLIC BLOOD PRESSURE: 76 MMHG | HEIGHT: 69 IN

## 2024-03-15 DIAGNOSIS — I44.1 MOBITZ TYPE 2 SECOND DEGREE ATRIOVENTRICULAR BLOCK: ICD-10-CM

## 2024-03-15 DIAGNOSIS — I63.512 ACUTE ISCHEMIC LEFT MCA STROKE (CMS/HCC): ICD-10-CM

## 2024-03-15 DIAGNOSIS — Z95.0 CARDIAC PACEMAKER: ICD-10-CM

## 2024-03-15 DIAGNOSIS — I51.3 LV (LEFT VENTRICULAR) MURAL THROMBUS: ICD-10-CM

## 2024-03-15 DIAGNOSIS — I25.5 ISCHEMIC CARDIOMYOPATHY: ICD-10-CM

## 2024-03-15 PROCEDURE — 99214 OFFICE O/P EST MOD 30 MIN: CPT | Performed by: INTERNAL MEDICINE

## 2024-03-15 PROCEDURE — G8753 SYS BP > OR = 140: HCPCS | Performed by: INTERNAL MEDICINE

## 2024-03-15 PROCEDURE — 25000000 HC PHARMACY GENERAL: Performed by: INTERNAL MEDICINE

## 2024-03-15 PROCEDURE — G8754 DIAS BP LESS 90: HCPCS | Performed by: INTERNAL MEDICINE

## 2024-03-15 PROCEDURE — 25500000 HC DRUGS/INCIDENT RAD: Mod: JZ | Performed by: INTERNAL MEDICINE

## 2024-03-15 PROCEDURE — 93306 TTE W/DOPPLER COMPLETE: CPT | Mod: 26 | Performed by: INTERNAL MEDICINE

## 2024-03-15 PROCEDURE — C8929 TTE W OR WO FOL WCON,DOPPLER: HCPCS

## 2024-03-15 RX ADMIN — PERFLUTREN: 6.52 INJECTION, SUSPENSION INTRAVENOUS at 11:49

## 2024-03-18 LAB
AORTIC ROOT ANNULUS - M-MODE: 2.6 CM
AORTIC VALVE MEAN VELOCITY: 1.18 M/S
AORTIC VALVE VELOCITY TIME INTEGRAL: 45.2 CM
AV MEAN GRADIENT: 6 MMHG
AV PEAK GRADIENT: 10 MMHG
AV PEAK VELOCITY-S: 1.56 M/S
AV VALVE AREA INDEX: 0.77
AV VALVE AREA: 1.28 CM2
AV VELOCITY RATIO: 0.32
AVA (VTI): 1.23 CM2
BSA FOR ECHO PROCEDURE: 1.59 M2
CUSP SEPARATION: 1.3 CM
DOP CALC LVOT STROKE VOLUME: 55.47 CM3
E WAVE DECELERATION TIME: 233 MS
E/A RATIO: 0.5
E/E' RATIO: 12.3
E/LAT E' RATIO: 4.1
EDV (BP): 87.5 CM3
EF (A4C): 46.2 %
EF A2C: 44.8 %
EJECTION FRACTION: 45.1 %
EST RIGHT VENT SYSTOLIC PRESSURE BY TRICUSPID REGURGITATION JET: 24 MMHG
ESV (BP): 48 CM3
HEART RATE: 60 BPM
LA ESV (BP): 26 CM3
LA ESV INDEX (A2C): 15.47 CM3/M2
LA ESV INDEX (BP): 16.35 CM3/M2
LAAS-AP2: 10.7 CM2
LAAS-AP4: 10.7 CM2
LAD 2D: 3.9 CM
LALD A4C: 3.63 CM
LALD A4C: 3.77 CM
LAV-S: 24.6 CM3
LEFT ATRIUM VOLUME INDEX: 16.73 CM3/M2
LEFT ATRIUM VOLUME: 26.6 CM3
LEFT VENTRICLE DIASTOLIC VOLUME INDEX: 45.91 CM3/M2
LEFT VENTRICLE DIASTOLIC VOLUME: 73 CM3
LEFT VENTRICLE SYSTOLIC VOLUME INDEX: 24.78 CM3/M2
LEFT VENTRICLE SYSTOLIC VOLUME: 39.4 CM3
LV DIASTOLIC VOLUME: 89.8 CM3
LV ESV (APICAL 2 CHAMBER): 49.6 CM3
LVAD-AP2: 32.7 CM2
LVAD-AP4: 27 CM2
LVAS-AP2: 22.6 CM2
LVAS-AP4: 18 CM2
LVEDVI(A2C): 56.48 CM3/M2
LVEDVI(BP): 55.03 CM3/M2
LVESVI(A2C): 31.19 CM3/M2
LVESVI(BP): 30.19 CM3/M2
LVLD-AP2: 9.61 CM
LVLD-AP4: 8.19 CM
LVLS-AP2: 8.12 CM
LVLS-AP4: 6.73 CM
LVOT 2D: 2.2 CM
LVOT A: 3.8 CM2
LVOT MG: 1 MMHG
LVOT MV: 0.43 M/S
LVOT PEAK VELOCITY: 0.67 M/S
LVOT PG: 2 MMHG
LVOT STROKE VOLUME INDEX: 34.89 ML/M2
LVOT VTI: 14.6 CM
MLH CV ECHO AVA INDEX VELOCITY RATIO: 0.8
MV E'TISSUE VEL-LAT: 0.11 M/S
MV E'TISSUE VEL-MED: 0.04 M/S
MV PEAK A VEL: 0.88 M/S
MV PEAK E VEL: 0.45 M/S
MV STENOSIS PRESSURE HALF TIME: 68 MS
MV VALVE AREA P 1/2 METHOD: 3.24 CM2
PULM VEIN S/D RATIO: 1.8
PV PEAK D VEL: 0.34 M/S
PV PEAK S VEL: 0.62 M/S
RAP: 3 MMHG
RVOT VMAX: 0.54 M/S
RVOT VTI: 12.5 CM
SEPTAL TISSUE DOPPLER FREE WALL LATE DIA VELOCITY (APICAL 4 CHAMBER VIEW): 0.11 M/S
TR MAX PG: 21.34 MMHG
TRICUSPID VALVE PEAK REGURGITATION VELOCITY: 2.31 M/S

## 2024-03-20 ASSESSMENT — ENCOUNTER SYMPTOMS
CONSTITUTIONAL NEGATIVE: 1
ENDOCRINE NEGATIVE: 1
GASTROINTESTINAL NEGATIVE: 1
RESPIRATORY NEGATIVE: 1
HEMATOLOGIC/LYMPHATIC NEGATIVE: 1
ALLERGIC/IMMUNOLOGIC NEGATIVE: 1
EYES NEGATIVE: 1
WEAKNESS: 1
CARDIOVASCULAR NEGATIVE: 1
PSYCHIATRIC NEGATIVE: 1
SPEECH DIFFICULTY: 1

## 2024-03-20 NOTE — ASSESSMENT & PLAN NOTE
[employer] Ackley XT DR MRI [id number] implanted on 11/18/2019.  Battery longevity estimated at 8.1 years until RRT.  Mode is DDD .  There is atrial pacing 47% of the time and V pacing 99.9% of the time there were no events on interrogation.  Atrial lead has a measured P wave of 3.2 mV, pacing threshold of 0.5 V at 0.4 ms, and a lead impedance of 645 ohms.  The RV lead is measured R wave of 9.9 mV, pacing threshold of 1.0 V 0.4 ms, and a lead impedance of 436 ohms.    Her device is MRI compatible.  If necessary, she can undergo an MRI.  There were no changes made to the permanent programming of her device.

## 2024-03-20 NOTE — ASSESSMENT & PLAN NOTE
She presented to [place] ED on 6/2/2023 where she was found to be confused and aphasic with right sided weakness. CT revealed acute infarct in left MCA. Echo showed an EF of 43% and localized apical aneurysm containing thrombus. She was started on Eliquis 5mg BID.     It appears her symptoms have continued to improved remarkably over the past couple months.  Though she remains frustrated, she is compliant with her rehab and therapy.

## 2024-03-20 NOTE — PROGRESS NOTES
Electrophysiology       Reason for visit: Follow-up   HPI   Jennifer Sams is a 79 y.o. female who comes to the office today for follow-up of her device and related arrhythmia issues. She is status post dual-chamber pacemaker secondary to 2-1 AV block.  Her past medical history is significant for CAD requiring 8 stents including stents to her LAD, RCA, and circumflex and mild ischemic cardiomyopathy. She had an echocardiogram that was unchanged from June 2023 echo with probable small apical thrombus.    She presented to [facility] on June 26,2023 after she was found confused and combative.  In the emergency department she was aphasic with right-sided weakness and hypertensive to the 190 systolic.  CT scan of the head revealed an acute infarct in left MCA with mild bleeding.  Echo showed an EF of 43% and localized apical aneurysm containing thrombus. A bubble study was negative. Etiology of her stroke was thought to be due to the localized apical aneurysm containing thrombus and she was started on anticoagulation with heparin and was later transitioned to Eliquis. Her telemetry during the admission shows atrial sensing with ventricular pacing. No atrial fibrillation was seen on monitor or her pacemaker.    Since that time has been doing well from cardiac standpoint, but remains very frustrated with her neurologic symptoms.  She continues to have some aphasia and right sided weakness.         Past Medical History:   Diagnosis Date    AAA (abdominal aortic aneurysm) (CMS/HCC)     Arthritis     Elevated blood pressure reading without diagnosis of hypertension 04/19/2019    Epistaxis 01/06/2020    GERD (gastroesophageal reflux disease) 04/19/2019    Glaucoma 04/19/2019    Heart murmur     Hiatal hernia     History of hip replacement, total, right 04/19/2019    Right hip 9/ 2016 and revision 2017     History of rheumatic fever as a child 04/19/2019    Hypercholesterolemia 04/19/2019    Ischemic cardiomyopathy  05/09/2019    Kidney cysts     MI (myocardial infarction) (CMS/[facility])     Osteoarthritis of multiple joints 04/19/2019    Osteoporosis     Pacemaker 12/09/2019    Raynaud's disease 04/19/2019    RSD (reflex sympathetic dystrophy) 04/19/2019    Of left foot    SOB (shortness of breath) 12/10/2019    Status post insertion of drug eluting coronary artery stent 05/09/2019    Stroke (CMS/[facility])      Past Surgical History:   Procedure Laterality Date    CHOLECYSTECTOMY OPEN      CORONARY ANGIOPLASTY WITH STENT PLACEMENT  04/29/2019    of LAD    CORONARY ANGIOPLASTY WITH STENT PLACEMENT  04/30/2019    of RCA    DILATION AND CURETTAGE OF UTERUS      EYE SURGERY      RIGHT CATARACT    FOOT SURGERY Left     JOINT REPLACEMENT Right 09/2016    total hip    REVISION TOTAL HIP ARTHROPLASTY Right 2017    TONSILLECTOMY       Allergies:  Ace inhibitors, Atorvastatin, Brilinta [ticagrelor], Codeine, Dilaudid [hydromorphone], Morphine sulfate, Onion, and Oxycodone    Current Outpatient Medications on File Prior to Visit   Medication Sig Dispense Refill    apixaban (ELIQUIS) 5 mg tablet Take 1 tablet (5 mg total) by mouth 2 (two) times a day Indications: treatment to prevent blood clots in chronic atrial fibrillation. 180 tablet 3    cyanocobalamin (VITAMIN B12) 100 mcg tablet Take 1,000 mcg by mouth daily.      escitalopram (LEXAPRO) 5 mg tablet Take 1 tablet (5 mg total) by mouth daily. 90 tablet 3    irbesartan (AVAPRO) 75 mg tablet Take 1 tablet (75 mg total) by mouth daily. 90 tablet 3    pantoprazole (PROTONIX) 40 mg EC tablet Take 1 tablet (40 mg total) by mouth daily. 90 tablet 3    rosuvastatin (CRESTOR) 20 mg tablet Take 1 tablet (20 mg total) by mouth nightly. 90 tablet 3     No current facility-administered medications on file prior to visit.     Social History     Socioeconomic History    Marital status: Single     Spouse name: None    Number of children: None    Years of education: None    Highest education level: None    Tobacco Use    Smoking status: Never    Smokeless tobacco: Never   Vaping Use    Vaping Use: Never used   Substance and Sexual Activity    Alcohol use: Not Currently    Drug use: No    Sexual activity: Not Currently     Social Determinants of Health     Financial Resource Strain: Low Risk  (7/3/2023)    Overall Financial Resource Strain (CARDIA)     Difficulty of Paying Living Expenses: Not hard at all   Food Insecurity: No Food Insecurity (3/26/2024)    Hunger Vital Sign     Worried About Running Out of Food in the Last Year: Never true     Ran Out of Food in the Last Year: Never true   Transportation Needs: No Transportation Needs (7/3/2023)    PRAPARE - Transportation     Lack of Transportation (Medical): No     Lack of Transportation (Non-Medical): No   Housing Stability: Low Risk  (7/3/2023)    Housing Stability Vital Sign     Unable to Pay for Housing in the Last Year: No     Number of Places Lived in the Last Year: 1     Unstable Housing in the Last Year: No     Family History   Problem Relation Age of Onset    Congenital heart disease Biological Mother         noted at autopsy    Pulmonary fibrosis Biological Father     Heart attack Paternal Grandfather      Review of Systems   Constitutional: Negative.    HENT: Negative.     Eyes: Negative.    Respiratory: Negative.     Cardiovascular: Negative.    Gastrointestinal: Negative.    Endocrine: Negative.    Genitourinary: Negative.    Skin: Negative.    Allergic/Immunologic: Negative.    Neurological:  Positive for speech difficulty and weakness.   Hematological: Negative.    Psychiatric/Behavioral: Negative.       Vitals:    03/15/24 1253   BP: (!) 148/46     Wt Readings from Last 3 Encounters:   03/15/24 51.7 kg (114 lb)   03/14/24 51.7 kg (114 lb)   02/12/24 51.3 kg (113 lb)     Physical Exam  Constitutional:       Appearance: Normal appearance. She is normal weight.   HENT:      Head: Normocephalic and atraumatic.   Cardiovascular:      Rate and Rhythm:  Normal rate and regular rhythm.      Pulses: Normal pulses.      Heart sounds: Normal heart sounds.   Pulmonary:      Effort: Pulmonary effort is normal.      Breath sounds: Normal breath sounds.   Skin:     General: Skin is warm and dry.   Neurological:      Mental Status: She is alert and oriented to person, place, and time.      Motor: Weakness present.      Comments: She has right-sided extremity weakness, and dysarthria/aphasia.        Lab Results   Component Value Date    WBC 5.12 07/10/2023    HGB 15.0 07/10/2023    HCT 45.0 07/10/2023     07/10/2023    CHOL 178 06/26/2023    TRIG 93 06/26/2023    HDL 71 06/26/2023    LDLCALC 88 06/26/2023    ALT 9 07/02/2023    AST 22 07/02/2023     07/10/2023    K 4.2 07/10/2023    CREATININE 0.8 07/10/2023    BUN 11 07/10/2023    TSH 2.94 04/27/2022    INR 1.1 06/28/2023       Cardiac Imaging    TRANSTHORACIC ECHO (TTE) COMPLETE 02/09/2022  · Left Ventricle: Normal ventricle size. Mild concentric left ventricular hypertrophy. Mild-to-moderately decreased systolic function. Estimated EF 40- 45%. LV SVI low at 28 mL/m² per beat. Basal and mid inferolateral akinesis, mid and apical anteroseptal akinesisGrade I diastolic dysfunction. Diastolic inflow pattern consistent with impaired relaxation. Normal left atrial pressure.  · Tricuspid aortic valve. Sclerotic aortic valve leaflets.  · Aorta: Sinuses of Valsalva normal-sized.  · Normal leaflet structure of the mitral valve.  · Normal-sized LA. Doppler flow of pulmonary veins not obtained.  · Normal-sized RV. Mildly reduced RV systolic function. Pacemaker/ICD lead present in the RV. Normal RV wall thickness. Mid free wall akinetic.  · Normal-sized RA. Pacemaker wire present in the RA.  · Tricuspid Valve: Normal structure. Trace regurgitation. Estimated RVSP = 27 mmHg.  · Pulmonic valve not well visualized.  · IVC collapses <50% during inspiration.  · Evidence of a prominent pericardial fat pad.  Limited study.   Tissue velocities not obtained.  Apical 2 chamber view not available.  Patient declined Definity.    Echo 6/27/2023  Left ventricle with normal dimensions and regional contraction abnormalities consistent with CAD in the LAD distribution: Localized apical infarction with aneurysm formation.  Paradoxical septal movement is consistent with ventricular pacing.  There is moderate left ventricular systolic and mild diastolic dysfunction.  LVEF 43%  Left ventricular apical thrombus  Left atrial pressure 14 mmHg  There is a visual suggestion of mild aortic stenosis. (Doppler assessment of the aortic valve is suboptimal)  Mild mitral regurgitation  Normal right ventricle dimensions and systolic function: TAPSE 1.84 cm  Mild tricuspid regurgitation  PASP 34 mmHg  Normal left atrial volume index  No pericardial effusion or vegetations  AS-V pacing  Technically difficult study: No parasternal imaging obtained  This study was performed with Definity contrast to optimize evaluation of regional and global left ventricular function  No significant change from 3/28/2023 except for current demonstration of left ventricular apical thrombus      Echocardiogram 3/15/2024    Left Ventricle: Normal ventricle size. Normal wall thickness. Probable small apical thrombus. Mildly decreased systolic function. Estimated EF 45%. Basal and mid inferolateral akinesis, mid and apical anteroseptal/septal akinesis.  Abnormal septal motion. Normal diastolic filling pattern for age.    Aortic Valve: Valve not well seen but likely trileaflet.  Sclerotic leaflets. No regurgitation. Mild stenosis. Peak velocity = 1.56 m/s. Mean gradient = 6.00 mmHg. Calculated area by cont eq = 1.23 cm2. Calculated dimensionless index = 0.32.    Aorta: Aortic root normal.    Right Ventricle: Ventricle not well visualized but probably normal size and at most mildly dilated. Pacer wire present. Normal systolic function.    Tricuspid Valve: Mild regurgitation. Estimated RVSP  = 24 mmHg.    Pericardium: Evidence of a prominent fat pad. Small anterior pericardial effusion    Left Atrium: Normal sized atrium.    Right Atrium: Normal sized atrium.    Mitral Valve: Mild regurgitation.    Pulmonic Valve: Valve not well visualized. No regurgitation.    IVC/SVC: Normal sized inferior vena cava. Inferior vena cava collapses >50% during inspiration.    Compared with June 2023, no significant change.      ECG sinus rhythm with ventricular pacing.     Cardiac pacemaker  Medtronic Slinger XT  MRI [id number] implanted on 11/18/2019.  Battery longevity estimated at 8.1 years until RRT.  Mode is DDD .  There is atrial pacing 47% of the time and V pacing 99.9% of the time there were no events on interrogation.  Atrial lead has a measured P wave of 3.2 mV, pacing threshold of 0.5 V at 0.4 ms, and a lead impedance of 645 ohms.  The RV lead is measured R wave of 9.9 mV, pacing threshold of 1.0 V 0.4 ms, and a lead impedance of 436 ohms.    Her device is MRI compatible.  If necessary, she can undergo an MRI.  There were no changes made to the permanent programming of her device.    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     Ischemic cardiomyopathy  Her echocardiogram today demonstrated an EF of 45%; with apical aneurysm small thrombus.  She is euvolemic on exam today. She follows closely with Dr. Salter. No anginal symptoms, SOB or MARIEE.     Acute ischemic left MCA stroke (CMS/HCC)  She presented to [facility] ED on 6/2/2023 where she was found to be confused and aphasic with right sided weakness. CT revealed acute infarct in left MCA. Echo showed an EF of 43% and localized apical aneurysm containing thrombus. She was started on Eliquis 5mg BID.     It appears her symptoms have continued to improved remarkably over the past couple months.  Though she remains frustrated, she is compliant with her rehab and  therapy.         Jermaine Carolina MD  03/15/2024

## 2024-03-20 NOTE — ASSESSMENT & PLAN NOTE
Her echocardiogram today demonstrated an EF of 45%; with apical aneurysm small thrombus.  She is euvolemic on exam today. She follows closely with Dr. Salter. No anginal symptoms, SOB or MARIEE.

## 2024-03-26 ENCOUNTER — HOSPITAL ENCOUNTER (OUTPATIENT)
Dept: SPEECH THERAPY | Age: 79
Setting detail: THERAPIES SERIES
Discharge: HOME | End: 2024-03-26
Attending: INTERNAL MEDICINE
Payer: MEDICARE

## 2024-03-26 ENCOUNTER — HOSPITAL ENCOUNTER (OUTPATIENT)
Dept: OCCUPATIONAL THERAPY | Age: 79
Setting detail: THERAPIES SERIES
Discharge: HOME | End: 2024-03-26
Attending: INTERNAL MEDICINE
Payer: MEDICARE

## 2024-03-26 DIAGNOSIS — I63.9 ISCHEMIC STROKE (CMS/HCC): Primary | ICD-10-CM

## 2024-03-26 DIAGNOSIS — M62.81 MUSCLE WEAKNESS (GENERALIZED): ICD-10-CM

## 2024-03-26 DIAGNOSIS — I63.9 CEREBROVASCULAR ACCIDENT (CVA), UNSPECIFIED MECHANISM (CMS/HCC): Primary | ICD-10-CM

## 2024-03-26 DIAGNOSIS — G81.90: ICD-10-CM

## 2024-03-26 DIAGNOSIS — I63.512 ACUTE ISCHEMIC LEFT MCA STROKE (CMS/HCC): ICD-10-CM

## 2024-03-26 DIAGNOSIS — R27.9 LACK OF COORDINATION: ICD-10-CM

## 2024-03-26 PROCEDURE — 97530 THERAPEUTIC ACTIVITIES: CPT | Mod: GO,59

## 2024-03-26 PROCEDURE — 92507 TX SP LANG VOICE COMM INDIV: CPT | Mod: GN

## 2024-03-26 PROCEDURE — 97112 NEUROMUSCULAR REEDUCATION: CPT | Mod: GO,59

## 2024-03-26 NOTE — OP OT TREATMENT LOG
OT NEURO FLOW SHEET    EXERCISES CURRENT SESSION TIME   NEURO RE-ED  CPT 10628 TOTAL TIME FOR SESSION 8-22 Minutes   AAROM/AROM     Strengthening      Strength Engaged in training for right hand coordination with inhibition of excessive thumb MCP opposition. She benefited from manual support for alignment at thumb MCP. Reviewed use of short opponens splint and would benefit from custom fabrication.     Gross Motor Control     Fine Motor Control     Sitting Jennifer/Balance     Standing Jennifer/Balance      Retraining     THERE ACT  CPT 54228 TOTAL TIME FOR SESSION 38-52 Minutes   Pain, Vitals, Meds, Etc. Monitored    Assessment Patient with 100% participation in re-evaluation and completed the following measures: AROM, /pinch, box and blocks, cerebellar testing, sensory testing and ADLs/IADLs. Please refer to attached note for objective and subjective findings.     HEP     Functional Mobility     Transfers     THERE EX  CPT 62446 TOTAL TIME FOR SESSION Not performed   PROM     Activity Tolerance     SELF-CARE  CPT 42801 TOTAL TIME FOR SESSION Not performed   Pt Education/Safety     ADL Training d    IADL Training     MODALITIES  CPT 30387 TOTAL TIME FOR SESSION Not performed   Ice/Heat     ATTENDED E-STIM  CPT 19319 TOTAL TIME FOR SESSION Not performed   Attended E-Stim     SPLINTING  CPT 29462 TOTAL TIME FOR SESSION Not performed   Fabrication/Check Out     Fit     Training     GROUP  CPT 91877 TOTAL TIME FOR SESSION Not performed

## 2024-03-26 NOTE — PROGRESS NOTES
Speech-Language Pathology Visit      SLP DAILY NOTE FOR OUTPATIENT THERAPY    Patient: Jennifer Sams   MRN: [id number]  : 1945 79 y.o.  Referring Physician: Ney Aparicio,*  Date of Visit: 3/26/2024      Certification Dates: 02/15/24 through 24    Diagnosis:   1. Cerebrovascular accident (CVA), unspecified mechanism (CMS/HCC)    2. Acute ischemic left MCA stroke (CMS/HCC)        Chief Complaints:  apraxia, aphasia    Precautions:        TODAY'S VISIT:    Session Time:  Start Time: 1505 (Received after OT re-eval)  Stop Time: 1558  Time Calculation (min): 53 min   History/Vitals/Pain/Encounter Info - 24 1411        Injury History/Precautions/Daily Required Info    Primary Therapist Magnolia Lundy MS,CCC/SLP     Chief Complaint/Reason for Visit  apraxia, aphasia     Onset of Illness/Injury or Date of Surgery 23     Referring Physician Dr. Aparicio     Document Type daily treatment     Patient/Family/Caregiver Comments/Observations Recieved from OT re-evaluation.     Patient reported fall since last visit No        Pain Assessment    Currently in pain No/Denies                Daily Treatment Assessment and Plan - 24 1523        Daily Treatment Assessment and Plan    Progress toward goals Progressing     Daily Outcome Summary Pt appears impulsive with response to auditory directions- due to increased stimuli.  REduced stimuli improves reading comp and auditory comp.     Plan and Recommendations 1-2 syllabel bilabial and tip alveolar sounds.  spoken language expression goal                  OBJECTIVE MEASUREMENTS TAKEN TODAY:    None Taken    Today's Treatment:     Today's Treatment:  LANGUAGE SLP FLOW SHEET    SKILL AREA CURRENT SESSION   SPEECH THERAPY: LANGUAGE  [facility] [id number]    MOTOR SPEECH  Pt will produce 1-3 syllable words   with /p,b,m,w,t,d,n,s,l/ in initial position 80% of presentations.      PN: 3/12/2024  Continue   Pt will repeat familiar greetings and common  exchanges provided a direct model, 80% of presentations. 3/26/2024   f s d=  Initial position difficulty   Approx   1 syllable: 15/15  2 syllalbe: 5/5    PN: 3/12/2024  Continue   SPOKEN LANG COMP  Pt will demonstrate understanding of complex sentences and short paragraphs with 80% accuracy to y/n questions.    WALC1: p 191-192 etc 3/26/2024  F4 shapes: provided a short read sentence, pt identified the shape heard embedded in the sentence.    PN: 3/12/2024  MET-Consider increased accuracy to 90% and/or increased length of paragraph   Pt will follow 2 step commands with 4 components, 80% of presentations. 3/26/2024  Language ted: 2 step directions  2-step:   objects 2/3 66%  First/then: 1/3 33%  Adjectives 0/3, 0%  2 adjectives 2/3, 66%  Before/after- in order: 2/3 66%  Before/after- reverse order 2/3 66%  Before/after + Adjectives: 1/3, 33%  Before/after + 2 Adjectives: 1/2, 50%  Before/after +3 Adjectives:; 0/2, 0%    PN: 3/12/2024  Take data   SPOKEN LANG EXP  Provided a descriptive cue and a F6 pictures written words, pt will name the described item, 80% of trials, min A    PN: 3/12/2024  Continue   Given simple picture cue pt will produce simple sentences of 3-4 words with simplified grammatical form and approximated content words to convey meaning, Min A, 80% of presentations. PN: 3/12/2024  Continue- reduce word bank   READING  Pt will match picture to word F4 90% of presentations. 3/26/2024  Pt unable to identify single word (written and read) in field of 8. Appears that the words in the sentence made it difficult to isolate.  88% hard F6         PN: 3/12/2024   MET, revise to picture to phrase/sentence   Pt will select picture/word for sentence completion, 80% 3/26/2024  Phrase completion F4: hard level 6/10    PN: 3/12/2024  Continue   Pt will demonstrate reading comprehension of sentences to brief paragraph- answering multiple choice questions, 80% min A PN: 3/12/2024  Take data   Education/Strategy  Training    Other

## 2024-03-26 NOTE — OP SLP TREATMENT LOG
Today's Treatment:  LANGUAGE SLP FLOW SHEET    SKILL AREA CURRENT SESSION   SPEECH THERAPY: LANGUAGE  CPT 04547    MOTOR SPEECH  Pt will produce 1-3 syllable words   with /p,b,m,w,t,d,n,s,l/ in initial position 80% of presentations.      PN: 3/12/2024  Continue   Pt will repeat familiar greetings and common exchanges provided a direct model, 80% of presentations. 3/26/2024   f s d=  Initial position difficulty   Approx   1 syllable: 15/15  2 syllalbe: 5/5    PN: 3/12/2024  Continue   SPOKEN LANG COMP  Pt will demonstrate understanding of complex sentences and short paragraphs with 80% accuracy to y/n questions.    WALC1: p 191-192 etc 3/26/2024  F4 shapes: provided a short read sentence, pt identified the shape heard embedded in the sentence.    PN: 3/12/2024  MET-Consider increased accuracy to 90% and/or increased length of paragraph   Pt will follow 2 step commands with 4 components, 80% of presentations. 3/26/2024  Language ted: 2 step directions  2-step:   objects 2/3 66%  First/then: 1/3 33%  Adjectives 0/3, 0%  2 adjectives 2/3, 66%  Before/after- in order: 2/3 66%  Before/after- reverse order 2/3 66%  Before/after + Adjectives: 1/3, 33%  Before/after + 2 Adjectives: 1/2, 50%  Before/after +3 Adjectives:; 0/2, 0%    PN: 3/12/2024  Take data   SPOKEN LANG EXP  Provided a descriptive cue and a F6 pictures written words, pt will name the described item, 80% of trials, min A    PN: 3/12/2024  Continue   Given simple picture cue pt will produce simple sentences of 3-4 words with simplified grammatical form and approximated content words to convey meaning, Min A, 80% of presentations. PN: 3/12/2024  Continue- reduce word bank   READING  Pt will match picture to word F4 90% of presentations. 3/26/2024  Pt unable to identify single word (written and read) in field of 8. Appears that the words in the sentence made it difficult to isolate.  88% hard F6         PN: 3/12/2024   MET, revise to picture to phrase/sentence    Pt will select picture/word for sentence completion, 80% 3/26/2024  Phrase completion F4: hard level 6/10    PN: 3/12/2024  Continue   Pt will demonstrate reading comprehension of sentences to brief paragraph- answering multiple choice questions, 80% min A PN: 3/12/2024  Take data   Education/Strategy Training    Other

## 2024-03-26 NOTE — PROGRESS NOTES
Occupational Therapy Progress Note    [facility] Fax: [phone]    OT RE-EVALUATION FOR OUTPATIENT THERAPY    Patient: Jennifer Sams   MRN: [id number]  : 1945 79 y.o.    Referring Physician: Ney Aparicio,*  Date of Visit: 3/26/2024    New Certification Dates: 24 through 24    Recommended Frequency & Duration:  2 times/week for up to 8 weeks         Diagnosis:   1. Ischemic stroke (CMS/HCC)    2. Lack of coordination    3. Muscle weakness (generalized)    4. Hemiplegia, dominant side (CMS/HCC)        Chief Complaints:  Chief Complaint   Patient presents with    Dec Strength    Dec Coordination    Decreased Endurance     Decreased Community Integration    Decreased recreational/play activity    Self Care Difficulties    Speech Problem       Precautions:   Existing Precautions/Restrictions: cardiac  Precautions comments: Pacemaker    TODAY'S VISIT:    Time In Session:  Start Time: 1405  Stop Time: 1504  Time Calculation (min): 59 min   General Information - 24 1410          Session Details    Document Type daily treatment        General Information    Onset of Illness/Injury or Date of Surgery 23     Referring Physician Dr. Ney Aparicio     History of present illness/functional impairment The patient is a 78-year-old  female with a history of AAA, heart block s/p cardiac pacemaker, glaucoma, hyperlipidemia, coronary artery disease, NSTEMI, who presented to [facility] on  after she has not been seen by her friends for a few days.  Patient lives alone and when she was found by EMS she was confused and combative.  In the emergency department she was aphasic with right-sided weakness and hypertensive to the 190 systolic.  CT scan of the head revealed an acute infarct in left MCA with mild bleeding.  Echo showed an EF of 43% and a bubble study was negative.  Etiology of her stroke was a localized apical aneurysm containing thrombus and she was  started on anticoagulation with heparin.  Later this was transitioned to apixaban.  She was continued on statin for secondary prevention.  She required virtual shivani while in the hospital but this was not effective.  She was evaluated by speech therapy and cleared for a soft and bite-size diet with moderately thick liquids.'     Limitations/Impairments safety/cognitive     Patient/Family/Caregiver Comments/Observations Patient arrives with report of fatigue.     Existing Precautions/Restrictions cardiac     Precautions comments Pacemaker         Services    Do You Speak a Language Other Than English at Home? no                    Daily Falls Screen - 03/26/24 1410          Daily Falls Assessment    Patient reported fall since last visit No                    Pain/Vitals - 03/26/24 1410          Pain Assessment    Currently in pain No/Denies                    OT - 03/26/24 1410          Occupational Therapy Encounter Type Details    Occupational Therapy Specialty Traditional Neuro Program OT        OT Frequency and Duration    Frequency of treatment 2 times/week     OT Duration 8 weeks     OT Cert From 03/26/24     OT Cert To 05/21/24     Date OT POC was sent to provider 03/26/24     Signed OT Plan of Care received?  No                    Assessment - 03/26/24 1410          Assessment    Plan of Care reviewed and patient/family in agreement Yes     Comments Patient and POA are in agreement with plan of care.     System Pathology/Pathophysiology Noted neuromuscular     Functional Limitations in Following Categories self-care;home management;community/leisure     Problem List: Occupational Therapy coordination impaired;impaired cognition;sensation decreased     Rehab Potential/Prognosis: Occupational Therapy good, to achieve stated therapy goals     Clinical Assessment Patient was referred to outpatient Occupational Therapy s/p CVA and subsequent RUE hemiparesis. Patient completed initial evaluation on  "8/16/2024 and has participated in 38 Occupational Therapy sessions, including this visit. She was discharged from Physical Therapy and continues to participate in Speech Language Pathology. Patient continues to live alone with an assistance who comes 2x per week to assist with home management and socialization. Her friend and previous co-worker serves as her power of  and is present to assist in interview due to patient's aphasia. Patient is completing dressing, toileting and bathing with modified independence. She does have increased difficulty with shoe typing but is able to don slip on shoes without physical assistance. She has a shower chair and grab bars for safety. She is able to self-feed with left/ non-dominant hand but requires assistance for cutting. Patient manages medications without assistance. She is very limited in meal preparation due to preference for warm meals that require increased coordination to complete. She is able to prepare a bowl of cereal and has warmed open canned soup on the stove top and microwave. However, her friend typically brings her meals daily. Patient has made progress toward all treatment goals and new goals have been established for this treatment period. Patient states \"eat\" and demonstrates opening containers as her primary goals for therapy. Patient demonstrates right malaika body sensory deficits in proprioception, light touch, deep pressure and sharp/dull. She is now endorses paresthesia proximal to right elbow which she notes is \"hurting\". She has also recently began to endorse more proximal shoulder pain compared to previous complaint of RUE distal pain. She does exhibit significantly limited internal rotation at 20 degrees today with hard end feel and pain provoked. Though it is less of a risk, will continue to monitor for heterotrophic ossification at glenohumeral joint. She continues with color changes to dorsum of right hand with increased purple shade noted. " YENE , pinch and coordination have improved across all measures. Her  has increased from 30-35 lbs, three jaw patti is increased by 5 lbs and tip pinch by 2 lbs. Box and blocks are at 48 compared to previous assistance at 28 blocks. Right hand strength has also improved with  strength from 5.5 to 10 lbs and tip pinch from 2 to 3 lbs.     Plan and Recommendations She continues to exhibit reduced motor control through RUE with confounded difficulty with sensation. She exhibits excessive thumb opposition with grasping which leads to increased drops and reduced forced. She will likely benefit from short opponens splint for improved performance. Patient will also begin to participate in RUE modified constraint induced movement therapy to continue to improve right hand function during coordination tasks. Patient continues to participate in therapies given explanation for task goals. Recommend continuation of outpatient Occupational Therapy services 2x/ week for 8 weeks to address identified deficits, promote maximal use of RUE with NMRE, improve self-feeding and bimanual meal preparation tasks and promote maximal functional occupational performance in baseline activities.     Planned Services CPT 13959 Manual therapy;CPT 91361 Neuromuscular Reeducation;CPT 66226 Self-care/Home management training;CPT 39935 Therapeutic activities;CPT 39890 Therapeutic exercises;CPT 15218 Hot/Cold Packs therapy     Comments/Additional Services [employer], BITS                     OBJECTIVE MEASUREMENTS/DATA:    Living Environment    Living Environment - 03/26/24 1410          Living Environment    People in Home alone   with a cat, daniel    Living Arrangements house     Living Environment Comment 2SH, B/B 2nd flr-tub shower with shower chair and grabbars in shower, FF stairs to 2nd flr w/L ascending rail, no DC.  no DARÍO. Caregiver 2 times per week for 4 hours per day who assists with home management and socialization.  Transportation from [facility] and provides assistance with managing care.                   PLOF    Prior Level of Function - 03/26/24 1410          OTHER    Previous level of function Independent ADL/IADLs, driving, lived alone entire life, finances, retired  to [employer]                   Skin and Sensation    Skin and Sensation - 03/26/24 1410          OTHER    Skin Assessment/Comments RUE impaired deep pressure, proprioception and light touch sensation                   Range of Motion     PROM/AROM - 03/26/24 1400          RIGHT: Upper Extremity AROM Assessment    Shoulder Flexion   150 degrees     Shoulder Abduction   135 degrees   unable to move through full sagittal pain    Shoulder Internal Rotation   20 degrees   hard end feel and pain    Shoulder External Rotation   --   WFL    Elbow Flexion/Extension   WFL     Wrist Flexion   60 degrees     Wrist Extension   55 degrees     Wrist Ulnar Deviation   50 degrees     Wrist Radial Deviation   20 degrees                   MMT    BADL/IADL    BADL/IADL - 03/26/24 1410          BADL Interventions Assessment    Upper Body Dressing Modified independence     Lower Body Dressing Modified independence     Lower body dressing comments Some improvement with tying shoe laces. Typically wears slip on shoes.     Bathing Modified independence     Bathing comments Using shower chair and grab bars, reliant on use of LUE. Now washing her hair 70% of time. 3/26/24: shower chair with grab bars, hair washing. Reports difficulty with her back and does not have long handled sponge.     Toileting Independent     Grooming Modified independence     Grooming comments Usually automatic toothbrush. Able to brush hair but difficulty with blow drying hair     Eating Modified independence     Eating comments Reliant on LUE. Reports she is unable to cut her food. Does not have rocker knife.        IADL/Home Interventions Assessment    Care of Others Independent     Care of Others  "comments Using LUE to clean litterbox, feed and water. Challenged by opening wet food.     Driving and community mobility Dependent (less than 25% patient effort)     Financial management Dependent (less than 25% patient effort)     Financial management comments Friend/POA manages finances.     Medication management Independent     Medication management comments Able to sort medications into weekly pill sorter and takes.     Home management/maintenance Independent     Home management/maintenance comments Laundry, light cleaning, washing dishes, organizing/straightening up     Meal prep / clean up comments Not able prepare meals. 3/26/24: was able to warm up soup on stove top following assist for container. Able to warm up food in mircrowave and prepare bowl of cereal. Patient has preference for fully preparaed hot meals which limits her independence. Friend typically brings her food.     Other (comments) Home health aid comes 2 times per week and performs light housekeeping, reminder about medication, and makes some meals                   Work and School     Work and School Assessment - 03/26/24 1410          Work/School/Leisure Assessment    Job Performance (comments) Retired     Leisure / Social Participation (comments) Patient reports that she is \"bored\"     Exercise Assessment (comments) Will walk with someone present. She endorses fear of falling with walking alone.                   Gross and Fine Motor     Gross and Fine Motor - 03/26/24 1410          Motor Skills    Coordination fine motor deficit;gross motor deficit;right;upper extremity;severe impairment;dysdiadochokinesia;dysmetria;finger to nose;ataxia     Functional Endurance Impaired     Comment, Motor Skills R grasp: IF to thenar: 1.3 cm lacking. Full digit extension: ulnar side lacking 10-20 degrees MCP extension upon initial attempt but fades into more flexed position gradually due to endurance deficits. Intrinsic weakness: unable to actively " "abduct. Weak attempt at intrinsic plus with difficulty maintaining IP extension. Bunell test indicates extrinsic and intrinsic tightness. Able to oppose thumb to digits 2-4 with visual and tactile cues.        Hand  Strength Testing    Left Hand, Setting 2 35     Right Hand, Setting 2 10     Left Hand: Tip (Pincer) Pinch Strength 9     Left Hand: Lateral (Key) Pinch Strength 14     Left Hand: Three Point ([place]atan) Pinch Strength 14     Right Hand: Tip (Pincer) Pinch Strength 3     Right Hand: Lateral (Key) Pinch Strength 6     Right Hand: Three Point ([place]) Pinch Strength 4                   Outcome Measures    Outcome Measures - 03/26/24 1410          Objective Outcome Measures    RIGHT hand: Box and Blocks Assessment 15     LEFT hand: Box and Blocks Assessment 48                   Vision     Vision - 03/26/24 1410          Vision Assessment    Visual Impairment/Limitations --   Has glasses. But states \"I want new ones\"                    ROM and MMT          11/28/2023    16:29 1/4/2024    19:06 1/11/2024    19:18 1/18/2024    17:11 3/26/2024    14:00   OT UE ROM Measurements   AROM: Right Shoulder Flexion     150 degrees   AROM: Right Shoulder ABD     135 degrees       unable to move through full sagittal pain   AROM: Right Shoulder IR     20 degrees       hard end feel and pain   AROM: Right Shoulder ER     --        WFL   AROM: Right Elbow Flex/Ext     WFL   AROM: Right Wrist Flexion     60 degrees   AROM: Right Wrist Extension     55 degrees   AROM: Right Wrist UD     50 degrees   AROM: Right Wrist RD     20 degrees   OT Cervical/Lumbar/Other ROM Measurements   Other: Girth Measurement/Comments  Right IF: P1-5.9 cm, PIP- 6cm, P2- 5.3 cm, DIP- 5.1 cm // Left IF: P1-5.6 cm, PIP-5.5 cm, P2- 4.9 cm, DIP-4.9 cm // Right MCP [place]- 17.5 cm Right IF: P1-5.8 cm, PIP- 5.9cm, P2- 5.1 cm, DIP- 4.9 // Right MCP [place]- 17.5 cm Right IF: P1-5.9 cm, PIP- 5.9cm, P2- 5.2 cm, DIP- 4.9 // Right MCP [place]- 17.5 cm          " .5 cm MF to DPC    OT UE MMT   Left Shoulder Flexion (4+/5) good plus       Left Shoulder Extension (4+/5) good plus       Left Shoulder ADD (4+/5) good plus       Left Shoulder ABD (4+/5) good plus       Left Shoulder IR (4+/5) good plus       Left Shoulder ER (4+/5) good plus       Left Elbow Flexion (4+/5) good plus       Left Elbow Extension (4+/5) good plus       Left Forearm Supination (4+/5) good plus       Left Forearm Pronation (4+/5) good plus       Left Wrist Flexion (4/5) good       Left Wrist Extension (4/5) good       Left Wrist UD (4/5) good       Left Wrist RD (4/5) good         Outcome Measures          11/2/2023    13:20 11/14/2023    14:20 12/14/2023    17:12 1/18/2024    17:11 2/6/2024    16:21 3/26/2024    14:10   OT OBJECTIVE Outcome Measures   9 Hole Peg Test - Right Hand 30.88       peg removal only 16.65       Removing only using lateral pinch 180       3 pegs placed 180       14.82- removal only 180       Removal only 32.46    9 Hole Peg Test - Left Hand  24.06  25.84       removing only     9 Hole Peg Test - Comments    Patient able to place 5 pegs in pegboard in 3 minutes at right hand Patient placed 3 pegs into board today in 3 minutes    Right Hand Box and Blocks  53 24 23 23 15   Left Hand Box and Blocks  28    48   Other Outcome Measure  Right MF to DPC = 1.5 cm           Today's Treatment:    OT NEURO FLOW SHEET    EXERCISES CURRENT SESSION TIME   NEURO RE-ED  CPT 74990 TOTAL TIME FOR SESSION 8-22 Minutes   AAROM/AROM     Strengthening      Strength Engaged in training for right hand coordination with inhibition of excessive thumb MCP opposition. She benefited from manual support for alignment at thumb MCP. Reviewed use of short opponens splint and would benefit from custom fabrication.     Gross Motor Control     Fine Motor Control     Sitting Jennifer/Balance     Standing Jennifer/Balance      Retraining     THERE ACT  CPT 67488 TOTAL TIME FOR SESSION 38-52 Minutes   Pain, Vitals,  Meds, Etc. Monitored    Assessment Patient with 100% participation in re-evaluation and completed the following measures: AROM, /pinch, box and blocks, cerebellar testing, sensory testing and ADLs/IADLs. Please refer to attached note for objective and subjective findings.     HEP     Functional Mobility     Transfers     THERE EX  CPT 40809 TOTAL TIME FOR SESSION Not performed   PROM     Activity Tolerance     SELF-CARE  CPT 28245 TOTAL TIME FOR SESSION Not performed   Pt Education/Safety     ADL Training d    IADL Training     MODALITIES  CPT 78071 TOTAL TIME FOR SESSION Not performed   Ice/Heat     ATTENDED E-STIM  CPT 63250 TOTAL TIME FOR SESSION Not performed   Attended E-Stim     SPLINTING  CPT 03318 TOTAL TIME FOR SESSION Not performed   Fabrication/Check Out     Fit     Training     GROUP  CPT 82005 TOTAL TIME FOR SESSION Not performed            Goals:     Goals         OT Neuro/Deconditioning Goals         Short Term Goals Time Frame Result Comment/Progress   Assess gross motor control via Box and Block Assessment  4 weeks Met 9/14: R: 22; L: 56   Assess fine motor control via 9 hole peg test 4 weeks Met  9/14: R: Unable; L: 24.55   Improved automatic fine motor use at right hand with routine tasks (e.g. eating, writing, brushing teeth) to 25% of baseline  4 weeks Ongoing     Explore adaptive visual strategies to encourage looking to right side 4 weeks Met    Incorporate right hand in at least 2 self-care activities 4 weeks Met    Carry over HEP at least 3 times per week  4 weeks Met     Score at least 32 on Box and block test 4 weeks Ongoing 2/7/24: 23 blocks  3/26/24: 15 blocks   Increase right hand  strength to 8 lbs 4 weeks Met    Engage in bilateral fine motor coordination task to open food or self-care containers with supervision during 4/6 trials for increased independence with meal preparation.  4 weeks New goal    Complete at least 4 activities from RUE modified constraint induced movement  "therapy activity log across 3 weeks with assistance as needed. 4 weeks New goal     Engage in simulated or real life food cutting activity using bilateral hands with no more than minimal assistance x 2 trials.  4 weeks New goal    Perform RUE scapular internal rotation through at least 45 degrees with no more than 1 point increase in pain in order to promote joint mobilization and reduce scapular adhesion.  4 weeks New goal RE 2/26/24: 20 degrees, hard end feel and pain   Complete right hand three jaw patti or tip pinch activity to retrieve 1x1 inch block and place in target area with short opponens splint donned or manual inhibition of excessive opposition x 10 repetitions across 2 trials for improved coordination 4 weeks New goal       Long Term Goals Time Frame Result Comment/Progress   Improve gross motor control as evidenced by at least 10 block improved at RUE with Box and Block Assessment for improved ability to perform ADLs and IADLs  12 weeks Met     Patient will complete 9 hole peg test in under 3 minutes 12 weeks Ongoing    Decrease distance from Right MF to DPC to 2 cm for improved functional grasp at right hand 12 weeks Met     Improved automatic fine motor use at right hand with routine tasks (e.g. eating, writing, brushing teeth) to 75% of baseline 12 weeks Ongoing     Increase right hand  strength to at least 15 lbs 12 weeks Ongoing 3/26/24: 6 lbs   Increase Box and Block score to at least 40 12 weeks Ongoing 2/7/24: 23 blocks  3/26/24: 15 blocks              Patient stated-OT 3/26/24 (pt-stated)       Patient states \"eat\" and demonstrates use of bilateral hands to open something                 This 79 y.o. year old female presents to OT with above stated diagnosis. Occupational Therapy evaluation reveals coordination impaired, impaired cognition, sensation decreased resulting in self-care, home management, community/leisure limitations. Jennifer Sams will benefit from skilled OT services to " address limitation, work towards rehab and patient goals and maximize PLOF of chosen ADLs.    Planned Services: The patient’s treatment will include CPT 43109 Manual therapy, CPT 05351 Neuromuscular Reeducation, CPT 22483 Self-care/Home management training, CPT 04979 Therapeutic activities, CPT 26971 Therapeutic exercises, CPT 02205 Hot/Cold Packs therapy,[employer], BITS.    Deloris Hendricks, OT

## 2024-03-28 ENCOUNTER — HOSPITAL ENCOUNTER (OUTPATIENT)
Dept: SPEECH THERAPY | Age: 79
Setting detail: THERAPIES SERIES
Discharge: HOME | End: 2024-03-28
Attending: INTERNAL MEDICINE
Payer: MEDICARE

## 2024-03-28 ENCOUNTER — HOSPITAL ENCOUNTER (OUTPATIENT)
Dept: OCCUPATIONAL THERAPY | Age: 79
Setting detail: THERAPIES SERIES
Discharge: HOME | End: 2024-03-28
Attending: INTERNAL MEDICINE
Payer: MEDICARE

## 2024-03-28 DIAGNOSIS — I63.512 ACUTE ISCHEMIC LEFT MCA STROKE (CMS/HCC): ICD-10-CM

## 2024-03-28 DIAGNOSIS — I63.9 ISCHEMIC STROKE (CMS/HCC): Primary | ICD-10-CM

## 2024-03-28 DIAGNOSIS — R27.9 LACK OF COORDINATION: ICD-10-CM

## 2024-03-28 DIAGNOSIS — I63.9 CEREBROVASCULAR ACCIDENT (CVA), UNSPECIFIED MECHANISM (CMS/HCC): Primary | ICD-10-CM

## 2024-03-28 PROCEDURE — 97530 THERAPEUTIC ACTIVITIES: CPT | Mod: GO,59

## 2024-03-28 PROCEDURE — 92507 TX SP LANG VOICE COMM INDIV: CPT | Mod: GN

## 2024-03-28 PROCEDURE — 97112 NEUROMUSCULAR REEDUCATION: CPT | Mod: GO,59

## 2024-03-28 NOTE — PROGRESS NOTES
Speech-Language Pathology Visit      SLP DAILY NOTE FOR OUTPATIENT THERAPY    Patient: Jennifer Sams   MRN: [id number]  : 1945 79 y.o.  Referring Physician: Ney Aparicio,*  Date of Visit: 3/28/2024      Certification Dates: 02/15/24 through 24    Diagnosis:   1. Cerebrovascular accident (CVA), unspecified mechanism (CMS/HCC)    2. Acute ischemic left MCA stroke (CMS/HCC)        Chief Complaints:  apraxia, aphasia    Precautions:          TODAY'S VISIT:    Session Time:  Start Time: 1506 (pt checked in a few minutes late)  Stop Time: 1557  Time Calculation (min): 51 min   History/Vitals/Pain/Encounter Info - 24 1508          Injury History/Precautions/Daily Required Info    Primary Therapist Magnolia Lundy MS,CCC/SLP     Chief Complaint/Reason for Visit  apraxia, aphasia     Onset of Illness/Injury or Date of Surgery 23     Referring Physician Dr. Aparicio     Document Type daily treatment     Patient/Family/Caregiver Comments/Observations Pt brought green folder with carryover practice made by Thelma     Patient reported fall since last visit No        Pain Assessment    Currently in pain No/Denies                    Daily Treatment Assessment and Plan - 24 1508          Daily Treatment Assessment and Plan    Progress toward goals Progressing     Daily Outcome Summary Improved production of bilabials in 1-2 syllable words. Naming improves with initial phoneme     Plan and Recommendations Continue spoken language expression gooals                      OBJECTIVE MEASUREMENTS TAKEN TODAY:    None Taken    Today's Treatment:     Today's Treatment:  LANGUAGE SLP FLOW SHEET    SKILL AREA CURRENT SESSION   SPEECH THERAPY: LANGUAGE  CPT 70243    MOTOR SPEECH  Pt will produce 1-3 syllable words   with /p,b,m,w,t,d,n,s,l/ in initial position 80% of presentations.  3/28/2024  /b/  1 syllable: 7/10  2 syllable: 2/2    /p/ direct model  1 syllable: 4/7  2 syllable: 3/4    /m/  direct model  1 syllable: 5/7  2 syllable: 10/13    /t/  auditory bombardment  1 syllable: 5/7  2 syllable:  did not practice.     PN: 3/12/2024  Continue   Pt will repeat familiar greetings and common exchanges provided a direct model, 80% of presentations. 3/28/2024  Provided 16 personal questions, F4 written choices, pt able to point to correct answer and repeat some modifications. 13/16    3/26/2024   f s d=  Initial position difficulty   Approx   1 syllable: 15/15  2 syllalbe: 5/5    PN: 3/12/2024  Continue   SPOKEN LANG COMP  Pt will demonstrate understanding of complex sentences and short paragraphs with 80% accuracy to y/n questions.    WALC1: p 191-192 etc 3/26/2024  F4 shapes: provided a short read sentence, pt identified the shape heard embedded in the sentence.    PN: 3/12/2024  MET-Consider increased accuracy to 90% and/or increased length of paragraph   Pt will follow 2 step commands with 4 components, 80% of presentations. 3/26/2024  Language ted: 2 step directions  2-step:   objects 2/3 66%  First/then: 1/3 33%  Adjectives 0/3, 0%  2 adjectives 2/3, 66%  Before/after- in order: 2/3 66%  Before/after- reverse order 2/3 66%  Before/after + Adjectives: 1/3, 33%  Before/after + 2 Adjectives: 1/2, 50%  Before/after +3 Adjectives:; 0/2, 0%    PN: 3/12/2024  Take data   SPOKEN LANG EXP  Provided a descriptive cue and a F6 pictures written words, pt will name the described item, 80% of trials, min A  3/28/2024  100% with some notable distortions: Mod A for first phoneme.     PN: 3/12/2024  Continue   Given simple picture cue pt will produce simple sentences of 3-4 words with simplified grammatical form and approximated content words to convey meaning, Min A, 80% of presentations. PN: 3/12/2024  Continue- reduce word bank   READING  Pt will match picture to word F4 90% of presentations. 3/28/2024  F2 words: Complete the phrases. 80%    3/26/2024  Pt unable to identify single word (written and read) in field  of 8. Appears that the words in the sentence made it difficult to isolate.  88% hard F6     PN: 3/12/2024   MET, revise to picture to phrase/sentence   Pt will select picture/word for sentence completion, 80% 3/26/2024  Phrase completion F4: hard level 6/10    PN: 3/12/2024  Continue   Pt will demonstrate reading comprehension of sentences to brief paragraph- answering multiple choice questions, 80% min A PN: 3/12/2024  Take data   Education/Strategy Training    Other

## 2024-03-28 NOTE — PROGRESS NOTES
Occupational Therapy Visit    OT DAILY NOTE FOR OUTPATIENT THERAPY    Patient: Jennifer Sams   MRN: [id number]  : 1945 79 y.o.  Referring Physician: Ney Aparicio,*  Date of Visit: 3/28/2024      Certification Dates: 24 through 24    Diagnosis:   1. Ischemic stroke (CMS/HCC)    2. Lack of coordination        Chief Complaints:        Precautions:  Existing Precautions/Restrictions: cardiac  Precautions comments: Pacemaker    TODAY'S VISIT    Time In Session:  Start Time: 1600  Stop Time: 1655  Time Calculation (min): 55 min   History/Vitals/Pain/Encounter Info - 24 1600        Injury History/Precautions/Daily Required Info    Document Type daily treatment     Onset of Illness/Injury or Date of Surgery 23     Referring Physician Dr. Ney Aparicio     Existing Precautions/Restrictions cardiac     Precautions comments Pacemaker     History of present illness/functional impairment The patient is a 78-year-old  female with a history of AAA, heart block s/p cardiac pacemaker, glaucoma, hyperlipidemia, coronary artery disease, NSTEMI, who presented to [facility] on  after she has not been seen by her friends for a few days.  Patient lives alone and when she was found by EMS she was confused and combative.  In the emergency department she was aphasic with right-sided weakness and hypertensive to the 190 systolic.  CT scan of the head revealed an acute infarct in left MCA with mild bleeding.  Echo showed an EF of 43% and a bubble study was negative.  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  Later this was transitioned to apixaban.  She was continued on statin for secondary prevention.  She required virtual shivani while in the hospital but this was not effective.  She was evaluated by speech therapy and cleared for a soft and bite-size diet with moderately thick liquids.'      Patient/Family/Caregiver Comments/Observations Patient reports good compliance with gentle AROM at LUE     Patient reported fall since last visit No        Pain Assessment    Currently in pain No/Denies     Preferred Pain Scale number (Numeric Rating Pain Scale)     Pain Rating (0-10): Pre Activity 0        Pre Activity Vital Signs    /80     BP Location Left upper arm     BP Method Manual     Patient Position Sitting         Services    Do You Speak a Language Other Than English at Home? no                Daily Treatment Assessment and Plan - 03/28/24 1817        Daily Treatment Assessment and Plan    Progress toward goals Progressing     Daily Outcome Summary Focus of session was on sensory re-education and initiating CIMT with activity log today. Patient demonstrates adequate proprioception to indicate up/down with high accuracy based on eyes occluded movement of RUE, using gestures to indicate the correct direction. Patient completed proprioceptive mirror exercise and streognosis exercise with high accuracy today. Patient educated on use of activity log incorporated CIMT and demonstrates good return with all task-oriented exercises today. She was able to successful complete all tasks today with min verbal cueing for opening/closing door to perform FA rotation at RUE instead of pulling on door handle. Patient appears motivated to carry over activity log with HEP.     Plan and Recommendations Continue CIMT, sensory re-ed, trial short opponens for improved position on right thumb with grasp                     OBJECTIVE DATA TAKEN TODAY    None Taken    Today's Treatment:    OT NEURO FLOW SHEET    EXERCISES CURRENT SESSION TIME   NEURO RE-ED  CPT 02202 TOTAL TIME FOR SESSION 38-52 Minutes   AAROM/AROM     Strengthening      Strength     Gross Motor Control Patient participated in video-guide coordination exercises for bilateral coordination retraining    Fine Motor Control Patient  participated in the following exercises for improved fine motor control at right hand:  -  pens and place into cup, right hand only  - fold up towel with right hand only  - open/close door with right hand only  - place tennis balls into bucket, right hand only    Sitting Jennifer/Balance     Standing Jennifer/Balance     Sensory re-ed Patient participated in the following exercises for sensory re-education:  - Proprioception of RUE with change in position up or down, eyes occluded  - Mirror image of partner with matching position using RUE  - right hand taps/squeezes with eyes occluded  - Stereognosis with soft ball under towel     Retraining     THERE ACT  CPT 21619 TOTAL TIME FOR SESSION 8-22 Minutes   Pain, Vitals, Meds, Etc. Vitals taken, pain assessed and monitored    Assessment     HEP 3/28/24: Activity log initiated with use of CIMT for improved right hand functioning    Functional Mobility     Transfers     THERE EX  CPT 12550 TOTAL TIME FOR SESSION Not performed   PROM     Activity Tolerance     SELF-CARE  CPT 19361 TOTAL TIME FOR SESSION Not performed   Pt Education/Safety     ADL Training     IADL Training     MODALITIES  CPT 28759 TOTAL TIME FOR SESSION Not performed   Ice/Heat     ATTENDED E-STIM  CPT 06104 TOTAL TIME FOR SESSION Not performed   Attended E-Stim     SPLINTING  CPT 98640 TOTAL TIME FOR SESSION Not performed   Fabrication/Check Out     Fit     Training     GROUP  CPT 30943 TOTAL TIME FOR SESSION Not performed

## 2024-03-28 NOTE — OP OT TREATMENT LOG
OT NEURO FLOW SHEET    EXERCISES CURRENT SESSION TIME   NEURO RE-ED  CPT 37051 TOTAL TIME FOR SESSION 38-52 Minutes   AAROM/AROM     Strengthening      Strength     Gross Motor Control Patient participated in video-guide coordination exercises for bilateral coordination retraining    Fine Motor Control Patient participated in the following exercises for improved fine motor control at right hand:  -  pens and place into cup, right hand only  - fold up towel with right hand only  - open/close door with right hand only  - place tennis balls into bucket, right hand only    Sitting Jennifer/Balance     Standing Jennifer/Balance     Sensory re-ed Patient participated in the following exercises for sensory re-education:  - Proprioception of RUE with change in position up or down, eyes occluded  - Mirror image of partner with matching position using RUE  - right hand taps/squeezes with eyes occluded  - Stereognosis with soft ball under towel     Retraining     THERE ACT  CPT 16073 TOTAL TIME FOR SESSION 8-22 Minutes   Pain, Vitals, Meds, Etc. Vitals taken, pain assessed and monitored    Assessment     HEP 3/28/24: Activity log initiated with use of CIMT for improved right hand functioning    Functional Mobility     Transfers     THERE EX  CPT 57423 TOTAL TIME FOR SESSION Not performed   PROM     Activity Tolerance     SELF-CARE  CPT 18964 TOTAL TIME FOR SESSION Not performed   Pt Education/Safety     ADL Training     IADL Training     MODALITIES  CPT 97072 TOTAL TIME FOR SESSION Not performed   Ice/Heat     ATTENDED E-STIM  CPT 86195 TOTAL TIME FOR SESSION Not performed   Attended E-Stim     SPLINTING  CPT 39073 TOTAL TIME FOR SESSION Not performed   Fabrication/Check Out     Fit     Training     GROUP  CPT 08890 TOTAL TIME FOR SESSION Not performed

## 2024-03-28 NOTE — OP SLP TREATMENT LOG
Today's Treatment:  LANGUAGE SLP FLOW SHEET    SKILL AREA CURRENT SESSION   SPEECH THERAPY: LANGUAGE  CPT 55586    MOTOR SPEECH  Pt will produce 1-3 syllable words   with /p,b,m,w,t,d,n,s,l/ in initial position 80% of presentations.  3/28/2024  /b/  1 syllable: 7/10  2 syllable: 2/2    /p/ direct model  1 syllable: 4/7  2 syllable: 3/4    /m/ direct model  1 syllable: 5/7  2 syllable: 10/13    /t/  auditory bombardment  1 syllable: 5/7  2 syllable:  did not practice.     PN: 3/12/2024  Continue   Pt will repeat familiar greetings and common exchanges provided a direct model, 80% of presentations. 3/28/2024  Provided 16 personal questions, F4 written choices, pt able to point to correct answer and repeat some modifications. 13/16    3/26/2024   f s d=  Initial position difficulty   Approx   1 syllable: 15/15  2 syllalbe: 5/5    PN: 3/12/2024  Continue   SPOKEN LANG COMP  Pt will demonstrate understanding of complex sentences and short paragraphs with 80% accuracy to y/n questions.    WALC1: p 191-192 etc 3/26/2024  F4 shapes: provided a short read sentence, pt identified the shape heard embedded in the sentence.    PN: 3/12/2024  MET-Consider increased accuracy to 90% and/or increased length of paragraph   Pt will follow 2 step commands with 4 components, 80% of presentations. 3/26/2024  Language ted: 2 step directions  2-step:   objects 2/3 66%  First/then: 1/3 33%  Adjectives 0/3, 0%  2 adjectives 2/3, 66%  Before/after- in order: 2/3 66%  Before/after- reverse order 2/3 66%  Before/after + Adjectives: 1/3, 33%  Before/after + 2 Adjectives: 1/2, 50%  Before/after +3 Adjectives:; 0/2, 0%    PN: 3/12/2024  Take data   SPOKEN LANG EXP  Provided a descriptive cue and a F6 pictures written words, pt will name the described item, 80% of trials, min A  3/28/2024  100% with some notable distortions: Mod A for first phoneme.     PN: 3/12/2024  Continue   Given simple picture cue pt will produce simple sentences of 3-4  words with simplified grammatical form and approximated content words to convey meaning, Min A, 80% of presentations. PN: 3/12/2024  Continue- reduce word bank   READING  Pt will match picture to word F4 90% of presentations. 3/28/2024  F2 words: Complete the phrases. 80%    3/26/2024  Pt unable to identify single word (written and read) in field of 8. Appears that the words in the sentence made it difficult to isolate.  88% hard F6     PN: 3/12/2024   MET, revise to picture to phrase/sentence   Pt will select picture/word for sentence completion, 80% 3/26/2024  Phrase completion F4: hard level 6/10    PN: 3/12/2024  Continue   Pt will demonstrate reading comprehension of sentences to brief paragraph- answering multiple choice questions, 80% min A PN: 3/12/2024  Take data   Education/Strategy Training    Other

## 2024-04-02 ENCOUNTER — TELEPHONE (OUTPATIENT)
Dept: REGISTRATION | Facility: CLINIC | Age: 79
End: 2024-04-02
Payer: MEDICARE

## 2024-04-02 ENCOUNTER — HOSPITAL ENCOUNTER (OUTPATIENT)
Dept: SPEECH THERAPY | Age: 79
Setting detail: THERAPIES SERIES
Discharge: HOME | End: 2024-04-02
Attending: INTERNAL MEDICINE
Payer: MEDICARE

## 2024-04-02 ENCOUNTER — HOSPITAL ENCOUNTER (OUTPATIENT)
Dept: OCCUPATIONAL THERAPY | Age: 79
Setting detail: THERAPIES SERIES
Discharge: HOME | End: 2024-04-02
Attending: INTERNAL MEDICINE
Payer: MEDICARE

## 2024-04-02 DIAGNOSIS — R27.9 LACK OF COORDINATION: ICD-10-CM

## 2024-04-02 DIAGNOSIS — G81.90: ICD-10-CM

## 2024-04-02 DIAGNOSIS — I63.9 ISCHEMIC STROKE (CMS/HCC): Primary | ICD-10-CM

## 2024-04-02 DIAGNOSIS — M62.81 MUSCLE WEAKNESS (GENERALIZED): ICD-10-CM

## 2024-04-02 DIAGNOSIS — I63.9 CEREBROVASCULAR ACCIDENT (CVA), UNSPECIFIED MECHANISM (CMS/HCC): Primary | ICD-10-CM

## 2024-04-02 PROCEDURE — 97112 NEUROMUSCULAR REEDUCATION: CPT | Mod: GO,59

## 2024-04-02 PROCEDURE — 97530 THERAPEUTIC ACTIVITIES: CPT | Mod: GO,59

## 2024-04-02 PROCEDURE — 92507 TX SP LANG VOICE COMM INDIV: CPT | Mod: GN

## 2024-04-02 NOTE — OP SLP TREATMENT LOG
Today's Treatment:  LANGUAGE SLP FLOW SHEET    SKILL AREA CURRENT SESSION   SPEECH THERAPY: LANGUAGE  CPT 55006    MOTOR SPEECH  Pt will produce 1-3 syllable words   with /p,b,m,w,t,d,n,s,l/ in initial position 80% of presentations.  4/2/2024  Practice auditory discrimination of /f/ vs /s/ and /sh/  3/28/2024  /b/  1 syllable: 7/10  2 syllable: 2/2    /p/ direct model  1 syllable: 4/7  2 syllable: 3/4    /m/ direct model  1 syllable: 5/7  2 syllable: 10/13    /t/  auditory bombardment  1 syllable: 5/7  2 syllable:  did not practice.     PN: 3/12/2024  Continue   Pt will repeat familiar greetings and common exchanges provided a direct model, 80% of presentations. 3/28/2024  Provided 16 personal questions, F4 written choices, pt able to point to correct answer and repeat some modifications. 13/16    3/26/2024   f s d=  Initial position difficulty   Approx   1 syllable: 15/15  2 syllalbe: 5/5    PN: 3/12/2024  Continue   SPOKEN LANG COMP  Pt will demonstrate understanding of complex sentences and short paragraphs with 80% accuracy to y/n questions.    WALC1: p 191-192 etc 3/26/2024  F4 shapes: provided a short read sentence, pt identified the shape heard embedded in the sentence.    PN: 3/12/2024  MET-Consider increased accuracy to 90% and/or increased length of paragraph   Pt will follow 2 step commands with 4 components, 80% of presentations. 3/26/2024  Language ted: 2 step directions  2-step:   objects 2/3 66%  First/then: 1/3 33%  Adjectives 0/3, 0%  2 adjectives 2/3, 66%  Before/after- in order: 2/3 66%  Before/after- reverse order 2/3 66%  Before/after + Adjectives: 1/3, 33%  Before/after + 2 Adjectives: 1/2, 50%  Before/after +3 Adjectives:; 0/2, 0%    PN: 3/12/2024  Take data   SPOKEN LANG EXP  Provided a descriptive cue and a F6 pictures written words, pt will name the described item, 80% of trials, min A  4/2/20242/2024 6/6 , 6/6, 6/6,  7/7, with word approximation 1-5 syllables.     3/28/2024  100% with some  "notable distortions: Mod A for first phoneme.     PN: 3/12/2024  Continue   Given simple picture cue pt will produce simple sentences of 3-4 words with simplified grammatical form and approximated content words to convey meaning, Min A, 80% of presentations. PN: 3/12/2024  Continue- reduce word bank   READING  Pt will match picture to word F4 90% of presentations. 4/2/2024  Match word to F4 of pictures. 100%  Matched aurally presented word to F4 words 100%   Y/N identify if words are real: 100%  3/28/2024  F2 words: Complete the phrases. 80%  Match picture to F3 category. L1/3: 100%, L3/3: 100%      3/26/2024  Pt unable to identify single word (written and read) in field of 8. Appears that the words in the sentence made it difficult to isolate.  88% hard F6     PN: 3/12/2024   MET, revise to picture to phrase/sentence   Pt will select picture/word for sentence completion, 80% 3/26/2024  Phrase completion F4: hard level 6/10    PN: 3/12/2024  Continue   Pt will demonstrate reading comprehension of sentences to brief paragraph- answering multiple choice questions, 80% min A 4/2/2024  CT\" Put steps in order:  L1/5: Unable     PN: 3/12/2024  Take data   Education/Strategy Training    Other         "

## 2024-04-02 NOTE — OP OT TREATMENT LOG
OT NEURO FLOW SHEET    EXERCISES CURRENT SESSION TIME   NEURO RE-ED  CPT 94342 TOTAL TIME FOR SESSION 38-52 Minutes   AAROM/AROM Patient engaged in right shoulder and scapular NMRE with focus on glenohumeral joint mobilization, tendon gliding, PNF D2 and D1, and prolonged stretching. RUE IR improved from 20 degrees to 45-50 degrees following handling.     Strengthening RUE IR and ER isometric strengthening with 3 second hold x 10 repetitions.     RUE isometric shoulder flexion through palm with wrist fully extended x 10 repetitions.      Strength     Gross Motor Control     Fine Motor Control Engaged in RUE NMRE with focus on lateral pinch. Applied coban wrapping to promote open webspace with slight improvement in grasp accuracy with various objects.     -With MCP support donned: Medium pegs with 4/5 trials, small bean bags 5/5 trials, coins 23/25.     -Without MCP support: medium pegs with 3/5 trials, coins 18/25.     Sitting Jennifer/Balance     Standing Jennifer/Balance     Sensory re-ed RUE perturbations with patient supine and shoulder 90 degrees flexion x 20.      Retraining     THERE ACT  CPT 79439 TOTAL TIME FOR SESSION 8-22 Minutes   Pain, Vitals, Meds, Etc. pain assessed and monitored    Assessment     HEP 4/2/24: Patient educated on proper alignment of thumb position to promote pad to pad and reduce excessive opposition and thumb rotation.     3/28/24: Activity log initiated with use of CIMT for improved right hand functioning    Functional Mobility     Transfers     THERE EX  CPT 58172 TOTAL TIME FOR SESSION Not performed   PROM     Activity Tolerance     SELF-CARE  CPT 47018 TOTAL TIME FOR SESSION Not performed   Pt Education/Safety     ADL Training     IADL Training     MODALITIES  CPT 00870 TOTAL TIME FOR SESSION Not performed   Ice/Heat     ATTENDED E-STIM  CPT 88265 TOTAL TIME FOR SESSION Not performed   Attended E-Stim     SPLINTING  CPT 96115 TOTAL TIME FOR SESSION Not performed   Fabrication/Check  Out     Fit     Training     GROUP  CPT 80767 TOTAL TIME FOR SESSION Not performed

## 2024-04-02 NOTE — TELEPHONE ENCOUNTER
requested call back to confirm added OT appt for today at 4p. was on the [facility] for OT appts this week. currently has 3p ST appt also.

## 2024-04-02 NOTE — PROGRESS NOTES
Occupational Therapy Visit    OT DAILY NOTE FOR OUTPATIENT THERAPY    Patient: Jennifer Sams   MRN: [id number]  : 1945 79 y.o.  Referring Physician: Ney Aparicio,*  Date of Visit: 2024      Certification Dates: 24 through 24    Diagnosis:   1. Ischemic stroke (CMS/HCC)    2. Lack of coordination    3. Muscle weakness (generalized)    4. Hemiplegia, dominant side (CMS/HCC)        Chief Complaints:   Brain Injury    Precautions:  Existing Precautions/Restrictions: cardiac  Precautions comments: Pacemaker    TODAY'S VISIT    Time In Session:  Start Time: 1602  Stop Time: 1658  Time Calculation (min): 56 min   History/Vitals/Pain/Encounter Info - 24 1601          Injury History/Precautions/Daily Required Info    Document Type daily treatment     Primary Therapist Brary Rosas OTR/L     Chief Complaint/Reason for Visit  Brain Injury     Onset of Illness/Injury or Date of Surgery 23     Referring Physician Dr. Ney Aparicio     Existing Precautions/Restrictions cardiac     Precautions comments Pacemaker     History of present illness/functional impairment The patient is a 78-year-old  female with a history of AAA, heart block s/p cardiac pacemaker, glaucoma, hyperlipidemia, coronary artery disease, NSTEMI, who presented to [facility] on  after she has not been seen by her friends for a few days.  Patient lives alone and when she was found by EMS she was confused and combative.  In the emergency department she was aphasic with right-sided weakness and hypertensive to the 190 systolic.  CT scan of the head revealed an acute infarct in left MCA with mild bleeding.  Echo showed an EF of 43% and a bubble study was negative.  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  Later this was transitioned to apixaban.  She was continued on statin for secondary prevention.   "She required virtual shivani while in the hospital but this was not effective.  She was evaluated by speech therapy and cleared for a soft and bite-size diet with moderately thick liquids.'     Patient/Family/Caregiver Comments/Observations Patient arrives with friend and is agreeable to treatment.     Patient reported fall since last visit No        Pain Assessment    Currently in pain Yes   patient states \"always\" and points to her feet and right shoulder    Word Pain Rating: Activity 2 - mild pain     Pain Rating (word): Post Activity 2 - mild pain        Pain Interventions    Intervention  Monitored, stretching and soft tissue mobilization     Post Intervention Comments Stable         Services    Do You Speak a Language Other Than English at Home? no                    Daily Treatment Assessment and Plan - 04/02/24 1701          Daily Treatment Assessment and Plan    Progress toward goals Progressing     Daily Outcome Summary Patient noted with significant improvement in patience and task persistence this session. Patient with positive Neers and Mosley test. She tolerated gentle glenohumeral joint mobilization and prolomnged stretching. Strong focus on internal rotation with improvement by 25 degrees following therapy. Patient trialed use of right thumb MCP support using coban in order to promote alignment and accuracy with grasping. Slight improvement noted but will continue to trial to achieve patient buy in. She continues to progress and will continue with plan of care.     Plan and Recommendations Continue CIMT, sensory re-ed, trial short opponens for improved position on right thumb with grasp                         OBJECTIVE DATA TAKEN TODAY    None Taken    Today's Treatment:    OT NEURO FLOW SHEET    EXERCISES CURRENT SESSION TIME   NEURO RE-ED  CPT 12097 TOTAL TIME FOR SESSION 38-52 Minutes   AAROM/AROM Patient engaged in right shoulder and scapular NMRE with focus on glenohumeral joint " mobilization, tendon gliding, PNF D2 and D1, and prolonged stretching. RUE IR improved from 20 degrees to 45-50 degrees following handling.     Strengthening RUE IR and ER isometric strengthening with 3 second hold x 10 repetitions.     RUE isometric shoulder flexion through palm with wrist fully extended x 10 repetitions.      Strength     Gross Motor Control     Fine Motor Control Engaged in RUE NMRE with focus on lateral pinch. Applied coban wrapping to promote open webspace with slight improvement in grasp accuracy with various objects.     -With MCP support donned: Medium pegs with 4/5 trials, small bean bags 5/5 trials, coins 23/25.     -Without MCP support: medium pegs with 3/5 trials, coins 18/25.     Sitting Jennifer/Balance     Standing Jennifer/Balance     Sensory re-ed RUE perturbations with patient supine and shoulder 90 degrees flexion x 20.      Retraining     THERE ACT  CPT 41173 TOTAL TIME FOR SESSION 8-22 Minutes   Pain, Vitals, Meds, Etc. pain assessed and monitored    Assessment     HEP 4/2/24: Patient educated on proper alignment of thumb position to promote pad to pad and reduce excessive opposition and thumb rotation.     3/28/24: Activity log initiated with use of CIMT for improved right hand functioning    Functional Mobility     Transfers     THERE EX  CPT 27725 TOTAL TIME FOR SESSION Not performed   PROM     Activity Tolerance     SELF-CARE  CPT 69032 TOTAL TIME FOR SESSION Not performed   Pt Education/Safety     ADL Training     IADL Training     MODALITIES  CPT 57607 TOTAL TIME FOR SESSION Not performed   Ice/Heat     ATTENDED E-STIM  CPT 81049 TOTAL TIME FOR SESSION Not performed   Attended E-Stim     SPLINTING  CPT 99685 TOTAL TIME FOR SESSION Not performed   Fabrication/Check Out     Fit     Training     GROUP  CPT 20173 TOTAL TIME FOR SESSION Not performed

## 2024-04-02 NOTE — PROGRESS NOTES
Speech-Language Pathology Visit      SLP DAILY NOTE FOR OUTPATIENT THERAPY    Patient: Jennifer Sams   MRN: [id number]  : 1945 79 y.o.  Referring Physician: Ney Aparicio,*  Date of Visit: 2024      Certification Dates: 02/15/24 through 24    Diagnosis:   1. Cerebrovascular accident (CVA), unspecified mechanism (CMS/HCC)        Chief Complaints:  apraxia, aphasia    Precautions:  Precautions Comments: aphasia, apraxia, pacemaker       TODAY'S VISIT:    Session Time:  Start Time: 1504 (Pt arrived a few minutes late.)  Stop Time: 1555  Time Calculation (min): 51 min   History/Vitals/Pain/Encounter Info - 24 1504          Injury History/Precautions/Daily Required Info    Primary Therapist Magnolia Lundy MS,CCC/SLP     Chief Complaint/Reason for Visit  apraxia, aphasia     Onset of Illness/Injury or Date of Surgery 23     Referring Physician Dr. Aparicio     Precautions Comments aphasia, apraxia, pacemaker     Limitations/Impairments other (see comments)     Document Type daily treatment     Patient/Family/Caregiver Comments/Observations Pt was able to express dry eyes and that she requested new drops from doctor.     Patient reported fall since last visit No        Pain Assessment    Currently in pain No/Denies                    Daily Treatment Assessment and Plan - 24 1504          Daily Treatment Assessment and Plan    Progress toward goals Progressing     Daily Outcome Summary Improved word approximation of multisyllabic words with first phoneme and marked syllable.  Pt unable to imitate /f/ this date.  persisted in trying.     Plan and Recommendations Produced simple sentences.3-4 words.                      OBJECTIVE MEASUREMENTS TAKEN TODAY:    None Taken    Today's Treatment:     Today's Treatment:  LANGUAGE SLP FLOW SHEET    SKILL AREA CURRENT SESSION   SPEECH THERAPY: LANGUAGE  CPT 07246    MOTOR SPEECH  Pt will produce 1-3 syllable words   with  /p,b,m,w,t,d,n,s,l/ in initial position 80% of presentations.  4/2/2024  Practice auditory discrimination of /f/ vs /s/ and /sh/  3/28/2024  /b/  1 syllable: 7/10  2 syllable: 2/2    /p/ direct model  1 syllable: 4/7  2 syllable: 3/4    /m/ direct model  1 syllable: 5/7  2 syllable: 10/13    /t/  auditory bombardment  1 syllable: 5/7  2 syllable:  did not practice.     PN: 3/12/2024  Continue   Pt will repeat familiar greetings and common exchanges provided a direct model, 80% of presentations. 3/28/2024  Provided 16 personal questions, F4 written choices, pt able to point to correct answer and repeat some modifications. 13/16    3/26/2024   f s d=  Initial position difficulty   Approx   1 syllable: 15/15  2 syllalbe: 5/5    PN: 3/12/2024  Continue   SPOKEN LANG COMP  Pt will demonstrate understanding of complex sentences and short paragraphs with 80% accuracy to y/n questions.    WALC1: p 191-192 etc 3/26/2024  F4 shapes: provided a short read sentence, pt identified the shape heard embedded in the sentence.    PN: 3/12/2024  MET-Consider increased accuracy to 90% and/or increased length of paragraph   Pt will follow 2 step commands with 4 components, 80% of presentations. 3/26/2024  Language ted: 2 step directions  2-step:   objects 2/3 66%  First/then: 1/3 33%  Adjectives 0/3, 0%  2 adjectives 2/3, 66%  Before/after- in order: 2/3 66%  Before/after- reverse order 2/3 66%  Before/after + Adjectives: 1/3, 33%  Before/after + 2 Adjectives: 1/2, 50%  Before/after +3 Adjectives:; 0/2, 0%    PN: 3/12/2024  Take data   SPOKEN LANG EXP  Provided a descriptive cue and a F6 pictures written words, pt will name the described item, 80% of trials, min A  4/2/2024  6/6 , 6/6, 6/6,  7/7, with word approximation 1-5 syllables.     3/28/2024  100% with some notable distortions: Mod A for first phoneme.     PN: 3/12/2024  Continue   Given simple picture cue pt will produce simple sentences of 3-4 words with simplified grammatical  "form and approximated content words to convey meaning, Min A, 80% of presentations. PN: 3/12/2024  Continue- reduce word bank   READING  Pt will match picture to word F4 90% of presentations. 4/2/2024  Match word to F4 of pictures. 100%  Matched aurally presented word to F4 words 100%   Y/N identify if words are real: 100%  3/28/2024  F2 words: Complete the phrases. 80%  Match picture to F3 category. L1/3: 100%, L3/3: 100%      3/26/2024  Pt unable to identify single word (written and read) in field of 8. Appears that the words in the sentence made it difficult to isolate.  88% hard F6     PN: 3/12/2024   MET, revise to picture to phrase/sentence   Pt will select picture/word for sentence completion, 80% 3/26/2024  Phrase completion F4: hard level 6/10    PN: 3/12/2024  Continue   Pt will demonstrate reading comprehension of sentences to brief paragraph- answering multiple choice questions, 80% min A 4/2/2024  CT\" Put steps in order:  L1/5: Unable     PN: 3/12/2024  Take data   Education/Strategy Training    Other                                   "

## 2024-04-04 ENCOUNTER — HOSPITAL ENCOUNTER (OUTPATIENT)
Dept: SPEECH THERAPY | Age: 79
Setting detail: THERAPIES SERIES
Discharge: HOME | End: 2024-04-04
Attending: INTERNAL MEDICINE
Payer: MEDICARE

## 2024-04-04 DIAGNOSIS — I63.9 CEREBROVASCULAR ACCIDENT (CVA), UNSPECIFIED MECHANISM (CMS/HCC): Primary | ICD-10-CM

## 2024-04-04 PROCEDURE — 92507 TX SP LANG VOICE COMM INDIV: CPT | Mod: GN

## 2024-04-04 NOTE — OP SLP TREATMENT LOG
Today's Treatment:  LANGUAGE SLP FLOW SHEET    SKILL AREA CURRENT SESSION   SPEECH THERAPY: LANGUAGE  CPT 28685 PN: 4/15/2024  POC: 5/8/2024   MOTOR SPEECH  Pt will produce 1-3 syllable words   with /p,b,m,w,t,d,n,s,l/ in initial position 80% of presentations.  4/2/2024  Practice auditory discrimination of /f/ vs /s/ and /sh/  3/28/2024  /b/  1 syllable: 7/10  2 syllable: 2/2    /p/ direct model  1 syllable: 4/7  2 syllable: 3/4    /m/ direct model  1 syllable: 5/7  2 syllable: 10/13    /t/  auditory bombardment  1 syllable: 5/7  2 syllable:  did not practice.     PN: 3/12/2024  Continue   Pt will repeat familiar greetings and common exchanges provided a direct model, 80% of presentations. 3/28/2024  Provided 16 personal questions, F4 written choices, pt able to point to correct answer and repeat some modifications. 13/16    3/26/2024   f s d=  Initial position difficulty   Approx   1 syllable: 15/15  2 syllalbe: 5/5    PN: 3/12/2024  Continue   SPOKEN LANG COMP  Pt will demonstrate understanding of complex sentences and short paragraphs with 80% accuracy to y/n questions.    WALC1: p 191-192 etc 3/26/2024  F4 shapes: provided a short read sentence, pt identified the shape heard embedded in the sentence.    PN: 3/12/2024  MET-Consider increased accuracy to 90% and/or increased length of paragraph   Pt will follow 2 step commands with 4 components, 80% of presentations. 3/26/2024  Language ted: 2 step directions  2-step:   objects 2/3 66%  First/then: 1/3 33%  Adjectives 0/3, 0%  2 adjectives 2/3, 66%  Before/after- in order: 2/3 66%  Before/after- reverse order 2/3 66%  Before/after + Adjectives: 1/3, 33%  Before/after + 2 Adjectives: 1/2, 50%  Before/after +3 Adjectives:; 0/2, 0%    PN: 3/12/2024  Take data   SPOKEN LANG EXP  Provided a descriptive cue and a F6 pictures written words, pt will name the described item, 80% of trials, min A  4/2/2024  6/6 , 6/6, 6/6,  7/7, with word approximation 1-5 syllables.  "    3/28/2024  100% with some notable distortions: Mod A for first phoneme.     PN: 3/12/2024  Continue   Given simple picture cue pt will produce simple sentences of 3-4 words with simplified grammatical form and approximated content words to convey meaning, Min A, 80% of presentations. 4/4/2024  Provided cards with article, noun and verb, pt practiced building and saying 3 words sentences- Min A to build sentence, max A to say sentences.  Matched words to picture (the boy fishes, the hat flies).  Practiced 3 words on Advanced language ted-  (the family plays, the baby crawls) 7/10, 10/10    PN: 3/12/2024  Continue- reduce word bank   READING  Pt will match picture to word F4 90% of presentations. 4/2/2024  Match word to F4 of pictures. 100%  Matched aurally presented word to F4 words 100%   Y/N identify if words are real: 100%  3/28/2024  F2 words: Complete the phrases. 80%  Match picture to F3 category. L1/3: 100%, L3/3: 100%    3/26/2024  Pt unable to identify single word (written and read) in field of 8. Appears that the words in the sentence made it difficult to isolate.  88% hard F6     PN: 3/12/2024   MET, revise to picture to phrase/sentence   Pt will select picture/word for sentence completion, 80% 4/4/2024  Reading sentence completion F2 choices: 9/10  Phrase completion F2:  9/10    3/26/2024  Phrase completion F4: hard level 6/10    PN: 3/12/2024  Continue   Pt will demonstrate reading comprehension of sentences to brief paragraph- answering multiple choice questions, 80% min A 4/4/2024  Reading sentence completion F2 choices:     4/2/2024  CT\" Put steps in order:  L1/5: Unable     PN: 3/12/2024  Take data   Education/Strategy Training    Other         "

## 2024-04-04 NOTE — PROGRESS NOTES
Speech-Language Pathology Visit      SLP DAILY NOTE FOR OUTPATIENT THERAPY    Patient: Jennifer Sams   MRN: [id number]  : 1945 79 y.o.  Referring Physician: Ney Aparicio,*  Date of Visit: 2024      Certification Dates: 02/15/24 through 24    Diagnosis:   1. Cerebrovascular accident (CVA), unspecified mechanism (CMS/HCC)        Chief Complaints:  apraxia, aphasia    Precautions:  Precautions Comments: aphasia, apraxia, pacemaker       TODAY'S VISIT:    Session Time:  Start Time: 1504  Stop Time: 1555  Time Calculation (min): 51 min   History/Vitals/Pain/Encounter Info - 24 1505          Injury History/Precautions/Daily Required Info    Primary Therapist Magnolia Lundy MS,CCC/SLP     Chief Complaint/Reason for Visit  apraxia, aphasia     Onset of Illness/Injury or Date of Surgery 23     Referring Physician Dr. Aparicio     Precautions Comments aphasia, apraxia, pacemaker     Document Type daily treatment     Patient reported fall since last visit No        Pain Assessment    Currently in pain No/Denies                    Daily Treatment Assessment and Plan - 24 1505          Daily Treatment Assessment and Plan    Progress toward goals Progressing     Daily Outcome Summary Observed with impulse this date resulting in reduced intelligibility.  Improved intelligibility when pt allowed for visual cues from the therapist.     Plan and Recommendations Continue with sentence completion.  Return to building 3-4 word sentences. 2 step commands.                      OBJECTIVE MEASUREMENTS TAKEN TODAY:    None Taken    Today's Treatment:     Today's Treatment:  LANGUAGE SLP FLOW SHEET    SKILL AREA CURRENT SESSION   SPEECH THERAPY: LANGUAGE  CPT 77941 PN: 4/15/2024  POC: 2024   MOTOR SPEECH  Pt will produce 1-3 syllable words   with /p,b,m,w,t,d,n,s,l/ in initial position 80% of presentations.  2024  Practice auditory discrimination of /f/ vs /s/ and  /sh/  3/28/2024  /b/  1 syllable: 7/10  2 syllable: 2/2    /p/ direct model  1 syllable: 4/7  2 syllable: 3/4    /m/ direct model  1 syllable: 5/7  2 syllable: 10/13    /t/  auditory bombardment  1 syllable: 5/7  2 syllable:  did not practice.     PN: 3/12/2024  Continue   Pt will repeat familiar greetings and common exchanges provided a direct model, 80% of presentations. 3/28/2024  Provided 16 personal questions, F4 written choices, pt able to point to correct answer and repeat some modifications. 13/16    3/26/2024   f s d=  Initial position difficulty   Approx   1 syllable: 15/15  2 syllalbe: 5/5    PN: 3/12/2024  Continue   SPOKEN LANG COMP  Pt will demonstrate understanding of complex sentences and short paragraphs with 80% accuracy to y/n questions.    WALC1: p 191-192 etc 3/26/2024  F4 shapes: provided a short read sentence, pt identified the shape heard embedded in the sentence.    PN: 3/12/2024  MET-Consider increased accuracy to 90% and/or increased length of paragraph   Pt will follow 2 step commands with 4 components, 80% of presentations. 3/26/2024  Language ted: 2 step directions  2-step:   objects 2/3 66%  First/then: 1/3 33%  Adjectives 0/3, 0%  2 adjectives 2/3, 66%  Before/after- in order: 2/3 66%  Before/after- reverse order 2/3 66%  Before/after + Adjectives: 1/3, 33%  Before/after + 2 Adjectives: 1/2, 50%  Before/after +3 Adjectives:; 0/2, 0%    PN: 3/12/2024  Take data   SPOKEN LANG EXP  Provided a descriptive cue and a F6 pictures written words, pt will name the described item, 80% of trials, min A  4/2/2024  6/6 , 6/6, 6/6,  7/7, with word approximation 1-5 syllables.     3/28/2024  100% with some notable distortions: Mod A for first phoneme.     PN: 3/12/2024  Continue   Given simple picture cue pt will produce simple sentences of 3-4 words with simplified grammatical form and approximated content words to convey meaning, Min A, 80% of presentations. 4/4/2024  Provided cards with article,  "noun and verb, pt practiced building and saying 3 words sentences- Min A to build sentence, max A to say sentences.  Matched words to picture (the boy fishes, the hat flies).  Practiced 3 words on Advanced language ted-  (the family plays, the baby crawls) 7/10, 10/10    PN: 3/12/2024  Continue- reduce word bank   READING  Pt will match picture to word F4 90% of presentations. 4/2/2024  Match word to F4 of pictures. 100%  Matched aurally presented word to F4 words 100%   Y/N identify if words are real: 100%  3/28/2024  F2 words: Complete the phrases. 80%  Match picture to F3 category. L1/3: 100%, L3/3: 100%    3/26/2024  Pt unable to identify single word (written and read) in field of 8. Appears that the words in the sentence made it difficult to isolate.  88% hard F6     PN: 3/12/2024   MET, revise to picture to phrase/sentence   Pt will select picture/word for sentence completion, 80% 4/4/2024  Reading sentence completion F2 choices: 9/10  Phrase completion F2:  9/10    3/26/2024  Phrase completion F4: hard level 6/10    PN: 3/12/2024  Continue   Pt will demonstrate reading comprehension of sentences to brief paragraph- answering multiple choice questions, 80% min A 4/4/2024  Reading sentence completion F2 choices:     4/2/2024  CT\" Put steps in order:  L1/5: Unable     PN: 3/12/2024  Take data   Education/Strategy Training    Other                                   "

## 2024-04-09 ENCOUNTER — HOSPITAL ENCOUNTER (OUTPATIENT)
Dept: OCCUPATIONAL THERAPY | Age: 79
Setting detail: THERAPIES SERIES
Discharge: HOME | End: 2024-04-09
Attending: INTERNAL MEDICINE
Payer: MEDICARE

## 2024-04-09 ENCOUNTER — HOSPITAL ENCOUNTER (OUTPATIENT)
Dept: SPEECH THERAPY | Age: 79
Setting detail: THERAPIES SERIES
Discharge: HOME | End: 2024-04-09
Attending: INTERNAL MEDICINE
Payer: MEDICARE

## 2024-04-09 DIAGNOSIS — I63.9 CARDIOEMBOLIC STROKE (CMS/HCC): ICD-10-CM

## 2024-04-09 DIAGNOSIS — I63.512 ACUTE ISCHEMIC LEFT MCA STROKE (CMS/HCC): ICD-10-CM

## 2024-04-09 DIAGNOSIS — R27.9 LACK OF COORDINATION: ICD-10-CM

## 2024-04-09 DIAGNOSIS — I63.9 ISCHEMIC STROKE (CMS/HCC): Primary | ICD-10-CM

## 2024-04-09 DIAGNOSIS — I63.9 CEREBROVASCULAR ACCIDENT (CVA), UNSPECIFIED MECHANISM (CMS/HCC): Primary | ICD-10-CM

## 2024-04-09 PROCEDURE — 97530 THERAPEUTIC ACTIVITIES: CPT | Mod: GO,59

## 2024-04-09 PROCEDURE — 92507 TX SP LANG VOICE COMM INDIV: CPT | Mod: GN

## 2024-04-09 PROCEDURE — 97112 NEUROMUSCULAR REEDUCATION: CPT | Mod: GO,59

## 2024-04-09 PROCEDURE — 97535 SELF CARE MNGMENT TRAINING: CPT | Mod: GO

## 2024-04-09 NOTE — OP SLP TREATMENT LOG
Today's Treatment:  LANGUAGE SLP FLOW SHEET    SKILL AREA CURRENT SESSION   SPEECH THERAPY: LANGUAGE  CPT 96129 PN: 4/15/2024  POC: 5/8/2024   MOTOR SPEECH  Pt will produce 1-3 syllable words   with /p,b,m,w,t,d,n,s,l/ in initial position 80% of presentations.  4/9/24:  /b/  1 syllable- 9/11  2 syllable- 0/5     /p/  1 syllable- 2/5  2 syllable-2/5    /d/   1-syllable 2/8    Pt unable to produce /f/ this date in words, despite max cues, including mirror (which pt did not like using). Pt was impulsive with this task and would often jump from word to word (in random order) despite being task directed by clinician. Pt also with decreased insight at times to incorrect productions. Pt frustrated at times with this task.       4/2/2024  Practice auditory discrimination of /f/ vs /s/ and /sh/  3/28/2024  /b/  1 syllable: 7/10  2 syllable: 2/2    /p/ direct model  1 syllable: 4/7  2 syllable: 3/4    /m/ direct model  1 syllable: 5/7  2 syllable: 10/13    /t/  auditory bombardment  1 syllable: 5/7  2 syllable:  did not practice.     PN: 3/12/2024  Continue   Pt will repeat familiar greetings and common exchanges provided a direct model, 80% of presentations. 4/9/24:  Provided 16 personal questions, F4 written choices, pt able to point to correct answer and repeat some modifications. 15/16.     3/28/2024  Provided 16 personal questions, F4 written choices, pt able to point to correct answer and repeat some modifications. 13/16    3/26/2024   f s d=  Initial position difficulty   Approx   1 syllable: 15/15  2 syllalbe: 5/5    PN: 3/12/2024  Continue   SPOKEN LANG COMP  Pt will demonstrate understanding of complex sentences and short paragraphs with 80% accuracy to y/n questions.    WALC1: p 191-192 etc 3/26/2024  F4 shapes: provided a short read sentence, pt identified the shape heard embedded in the sentence.    PN: 3/12/2024  MET-Consider increased accuracy to 90% and/or increased length of paragraph   Pt will follow 2 step  commands with 4 components, 80% of presentations. 3/26/2024  Language ted: 2 step directions  2-step:   objects 2/3 66%  First/then: 1/3 33%  Adjectives 0/3, 0%  2 adjectives 2/3, 66%  Before/after- in order: 2/3 66%  Before/after- reverse order 2/3 66%  Before/after + Adjectives: 1/3, 33%  Before/after + 2 Adjectives: 1/2, 50%  Before/after +3 Adjectives:; 0/2, 0%    PN: 3/12/2024  Take data   SPOKEN LANG EXP  Provided a descriptive cue and a F6 pictures written words, pt will name the described item, 80% of trials, min A  4/9/24: Pt observed with conversation with unfamiliar communication partner (this SLP) as well as familiar communication partner (friend Samara). Pt uses gestures and speech approximations (and at times accurate spontaneous phrases) to communicate. Does continue to heavily rely on communication partners to supplement conversation. Pt frustrated quickly when others cannot understand.       4/2/2024  6/6 , 6/6, 6/6,  7/7, with word approximation 1-5 syllables.     3/28/2024  100% with some notable distortions: Mod A for first phoneme.     PN: 3/12/2024  Continue   Given simple picture cue pt will produce simple sentences of 3-4 words with simplified grammatical form and approximated content words to convey meaning, Min A, 80% of presentations. 4/4/2024  Provided cards with article, noun and verb, pt practiced building and saying 3 words sentences- Min A to build sentence, max A to say sentences.  Matched words to picture (the boy fishes, the hat flies).  Practiced 3 words on Advanced language ted-  (the family plays, the baby crawls) 7/10, 10/10    PN: 3/12/2024  Continue- reduce word bank   READING  Pt will match picture to word F4 90% of presentations. 4/2/2024  Match word to F4 of pictures. 100%  Matched aurally presented word to F4 words 100%   Y/N identify if words are real: 100%  3/28/2024  F2 words: Complete the phrases. 80%  Match picture to F3 category. L1/3: 100%, L3/3:  "100%    3/26/2024  Pt unable to identify single word (written and read) in field of 8. Appears that the words in the sentence made it difficult to isolate.  88% hard F6     PN: 3/12/2024   MET, revise to picture to phrase/sentence   Pt will select picture/word for sentence completion, 80% 4/4/2024  Reading sentence completion F2 choices: 9/10  Phrase completion F2:  9/10    3/26/2024  Phrase completion F4: hard level 6/10    PN: 3/12/2024  Continue   Pt will demonstrate reading comprehension of sentences to brief paragraph- answering multiple choice questions, 80% min A 4/4/2024  Reading sentence completion F2 choices:     4/2/2024  CT\" Put steps in order:  L1/5: Unable     PN: 3/12/2024  Take data   Education/Strategy Training 4/9/24:  Provided pt with education and encouragement re: stroke recovery process and progress achieved thus far in therapy.    Other         "

## 2024-04-10 NOTE — OP OT TREATMENT LOG
OT NEURO FLOW SHEET    EXERCISES CURRENT SESSION TIME   NEURO RE-ED  CPT 33960 TOTAL TIME FOR SESSION 8-22 Minutes   AAROM/AROM     Strengthening RUE ulnar push down with hand in fist into theraputty for improved functional strength for bimanual eating     Strength     Gross Motor Control     Fine Motor Control  Grasp and release with soft theraputty balls, using pincer and tripod grasp at right hand    Sitting Jennifer/Balance     Standing Jennifer/Balance     Sensory re-ed      Retraining     THERE ACT  CPT 07465 TOTAL TIME FOR SESSION 8-22 Minutes   Pain, Vitals, Meds, Etc. Vitals taken pain assessed and monitored    Assessment     HEP 4/9/24: Reviewed activity log    4/2/24: Patient educated on proper alignment of thumb position to promote pad to pad and reduce excessive opposition and thumb rotation.     3/28/24: Activity log initiated with use of CIMT for improved right hand functioning    Functional Mobility     Transfers     THERE EX  CPT 57173 TOTAL TIME FOR SESSION Not performed   PROM     Activity Tolerance     SELF-CARE  CPT 27269 TOTAL TIME FOR SESSION 23-37 Minutes   Pt Education/Safety     ADL Training Patient participated in food cutting activity with built up fork and knife, using right hand to hold fork and stabilize food while left hand performed cutting. Patient required placement of fork into right hand on first few trials, using cylindrical full-hand grasp to hold fork. She progressed from soft putty (purple) to blue putty (firm) today.    IADL Training     MODALITIES  CPT 83007 TOTAL TIME FOR SESSION Not performed   Ice/Heat     ATTENDED E-STIM  CPT 01012 TOTAL TIME FOR SESSION Not performed   Attended E-Stim     SPLINTING  CPT 99866 TOTAL TIME FOR SESSION Not performed   Fabrication/Check Out     Fit     Training     GROUP  CPT 21816 TOTAL TIME FOR SESSION Not performed

## 2024-04-10 NOTE — PROGRESS NOTES
Occupational Therapy Visit    OT DAILY NOTE FOR OUTPATIENT THERAPY    Patient: Jennifer Sams   MRN: [id number]  : 1945 79 y.o.  Referring Physician: Ney Aparicio,*  Date of Visit: 2024      Diagnosis:   1. Ischemic stroke (CMS/HCC)    2. Lack of coordination        Chief Complaints:   Brain Injury    Precautions:  Existing Precautions/Restrictions: cardiac  Precautions comments: Pacemaker    TODAY'S VISIT    Time In Session:  Start Time: 1605  Stop Time: 1700  Time Calculation (min): 55 min   History/Vitals/Pain/Encounter Info - 24 1607          Injury History/Precautions/Daily Required Info    Document Type daily treatment     Primary Therapist Barry Rosas, OTR/L     Chief Complaint/Reason for Visit  Brain Injury     Onset of Illness/Injury or Date of Surgery 23     Referring Physician Dr. Ney Aparicio     Existing Precautions/Restrictions cardiac     Precautions comments Pacemaker     History of present illness/functional impairment The patient is a 78-year-old  female with a history of AAA, heart block s/p cardiac pacemaker, glaucoma, hyperlipidemia, coronary artery disease, NSTEMI, who presented to [facility] on  after she has not been seen by her friends for a few days.  Patient lives alone and when she was found by EMS she was confused and combative.  In the emergency department she was aphasic with right-sided weakness and hypertensive to the 190 systolic.  CT scan of the head revealed an acute infarct in left MCA with mild bleeding.  Echo showed an EF of 43% and a bubble study was negative.  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  Later this was transitioned to apixaban.  She was continued on statin for secondary prevention.  She required virtual shivani while in the hospital but this was not effective.  She was evaluated by speech therapy and cleared for  a soft and bite-size diet with moderately thick liquids.'     Patient/Family/Caregiver Comments/Observations Patient arrives with activity log and reports near daily participation in right hand only functional activity.     Patient reported fall since last visit No        Pain Assessment    Currently in pain No/Denies        Pre Activity Vital Signs    /60         Services    Do You Speak a Language Other Than English at Home? no                    Daily Treatment Assessment and Plan - 04/10/24 0654          Daily Treatment Assessment and Plan    Progress toward goals Progressing     Daily Outcome Summary Patient arrives with activity log issued last treatment session with this writer and caregiver Thelma reports near daily participation in at least 1-2 activities. Focus of session today on bimanual eating with use of right hand to grasp and hold fork, with left hand for cutting. Patient required physical cueing to grasp fork but able to performing independently with repetition. She was successful with cutting through soft putty, simulating soft foods. She was partially successful with cutting through firm putty which indicates that more practice is needed for cutting tough meat. Adaptive equipment explored and patient appears open to trialling for improved stability of plate.     Plan and Recommendations Continue CIMT, sensory re-ed, trial short opponens for improved position on right thumb with grasp                         OBJECTIVE DATA TAKEN TODAY    None Taken    Today's Treatment:    OT NEURO FLOW SHEET    EXERCISES CURRENT SESSION TIME   NEURO RE-ED  CPT 53207 TOTAL TIME FOR SESSION 8-22 Minutes   AAROM/AROM     Strengthening RUE ulnar push down with hand in fist into theraputty for improved functional strength for bimanual eating     Strength     Gross Motor Control     Fine Motor Control  Grasp and release with soft theraputty balls, using pincer and tripod grasp at right hand    Sitting  Jennifer/Balance     Standing Jennifer/Balance     Sensory re-ed      Retraining     THERE ACT  CPT 33104 TOTAL TIME FOR SESSION 8-22 Minutes   Pain, Vitals, Meds, Etc. Vitals taken pain assessed and monitored    Assessment     HEP 4/9/24: Reviewed activity log    4/2/24: Patient educated on proper alignment of thumb position to promote pad to pad and reduce excessive opposition and thumb rotation.     3/28/24: Activity log initiated with use of CIMT for improved right hand functioning    Functional Mobility     Transfers     THERE EX  CPT 13709 TOTAL TIME FOR SESSION Not performed   PROM     Activity Tolerance     SELF-CARE  CPT 23209 TOTAL TIME FOR SESSION 23-37 Minutes   Pt Education/Safety     ADL Training Patient participated in food cutting activity with built up fork and knife, using right hand to hold fork and stabilize food while left hand performed cutting. Patient required placement of fork into right hand on first few trials, using cylindrical full-hand grasp to hold fork. She progressed from soft putty (purple) to blue putty (firm) today.    IADL Training     MODALITIES  CPT 34302 TOTAL TIME FOR SESSION Not performed   Ice/Heat     ATTENDED E-STIM  CPT 54921 TOTAL TIME FOR SESSION Not performed   Attended E-Stim     SPLINTING  CPT 28828 TOTAL TIME FOR SESSION Not performed   Fabrication/Check Out     Fit     Training     GROUP  CPT 52514 TOTAL TIME FOR SESSION Not performed

## 2024-04-11 ENCOUNTER — HOSPITAL ENCOUNTER (OUTPATIENT)
Dept: SPEECH THERAPY | Age: 79
Setting detail: THERAPIES SERIES
Discharge: HOME | End: 2024-04-11
Attending: INTERNAL MEDICINE
Payer: MEDICARE

## 2024-04-11 ENCOUNTER — HOSPITAL ENCOUNTER (OUTPATIENT)
Dept: OCCUPATIONAL THERAPY | Age: 79
Setting detail: THERAPIES SERIES
Discharge: HOME | End: 2024-04-11
Attending: INTERNAL MEDICINE
Payer: MEDICARE

## 2024-04-11 DIAGNOSIS — I63.512 ACUTE ISCHEMIC LEFT MCA STROKE (CMS/HCC): ICD-10-CM

## 2024-04-11 DIAGNOSIS — I63.9 ISCHEMIC STROKE (CMS/HCC): Primary | ICD-10-CM

## 2024-04-11 DIAGNOSIS — R27.9 LACK OF COORDINATION: ICD-10-CM

## 2024-04-11 DIAGNOSIS — I63.9 CEREBROVASCULAR ACCIDENT (CVA), UNSPECIFIED MECHANISM (CMS/HCC): Primary | ICD-10-CM

## 2024-04-11 PROCEDURE — 97535 SELF CARE MNGMENT TRAINING: CPT | Mod: GO

## 2024-04-11 PROCEDURE — 97530 THERAPEUTIC ACTIVITIES: CPT | Mod: GO,59

## 2024-04-11 PROCEDURE — 97112 NEUROMUSCULAR REEDUCATION: CPT | Mod: GO,59

## 2024-04-11 PROCEDURE — 92507 TX SP LANG VOICE COMM INDIV: CPT | Mod: GN

## 2024-04-11 NOTE — OP SLP TREATMENT LOG
" Today's Treatment:  LANGUAGE SLP FLOW SHEET    SKILL AREA CURRENT SESSION   SPEECH THERAPY: LANGUAGE  CPT 14974 PN: 4/15/2024  POC: 5/8/2024   MOTOR SPEECH  Pt will produce 1-3 syllable words   with /p,b,m,w,t,d,n,s,l/ in initial position 80% of presentations.  PN: 4/11/2024  Improving  /b/  1 syllable: 7/10  2 syllable: 2/2    /p/ direct model  1 syllable: 4/7  2 syllable: 3/4    /m/ direct model  1 syllable: 5/7  2 syllable: 10/13    /t/  auditory bombardment  1 syllable: 5/7  2 syllable:  did not practice.        Pt will repeat familiar greetings and common exchanges provided a direct model, 80% of presentations. PN: 4/11/2024  Improving  Dialogue boxes: single words 15/17- 88% , + one sentence \" I don't want it.\"    4/9/24:  Provided 16 personal questions, F4 written choices, pt able to point to correct answer and repeat some modifications. 15/16.     3/28/2024  Provided 16 personal questions, F4 written choices, pt able to point to correct answer and repeat some modifications. 13/16    3/26/2024   f s d=  Initial position difficulty   Approx   1 syllable: 15/15  2 syllalbe: 5/5    PN: 3/12/2024  Continue   SPOKEN LANG COMP  Pt will demonstrate understanding of complex sentences and short paragraphs with 80% accuracy to y/n questions.    WALC1: p 191-192 etc 3/26/2024  F4 shapes: provided a short read sentence, pt identified the shape heard embedded in the sentence.    PN: 3/12/2024  MET-Consider increased accuracy to 90% and/or increased length of paragraph   Pt will follow 2 step commands with 4 components, 80% of presentations. PN: 4/11/2024  Improving  2 step directions  2-step:   objects 100%  Adjectives 40%  First/then: 1/3 33%  Adjectives 0/3, 0%  2 adjectives 2/3, 66%  Before/after- in order: 2/3 66%  Before/after- reverse order 2/3 66%  Before/after + Adjectives: 1/3, 33%  Before/after + 2 Adjectives: 1/2, 50%  Before/after +3 Adjectives:; 0/2, 0%    PN: 3/12/2024  Take data   SPOKEN LANG EXP  Provided " a descriptive cue and a F6 pictures written words, pt will name the described item, 80% of trials, min A  PN: 4/11/2024  MET   Given simple picture cue pt will produce simple sentences of 3-4 words with simplified grammatical form and approximated content words to convey meaning, Min A, 80% of presentations. 4/4/2024  Provided cards with article, noun and verb, pt practiced building and saying 3 words sentences- Min A to build sentence, max A to say sentences.  Matched words to picture (the boy fishes, the hat flies).  Practiced 3 words on Advanced language ted-  (the family plays, the baby crawls) 7/10, 10/10    PN: 3/12/2024  Continue- reduce word bank   READING  Read phrase completion with F4 , 80% PN: 4/11/2024  Improving 70%   Pt will select picture/word for sentence completion, 80% PN: 4/11/2024  Improving 60%       Pt will demonstrate reading comprehension of sentences to brief paragraph- answering multiple choice questions, 80% min A PN: 411/2024  Improving   Match sentence (F3) to picture 4/4  Continue to brief paragraph.     Reading 35-43 words and answered  F2 questions, 100%       Education/Strategy Training 4/11/2024  Reviewed progress this date.     Other

## 2024-04-11 NOTE — PROGRESS NOTES
Speech-Language Pathology Progress Note      SLP PROGRESS NOTE FOR OUTPATIENT THERAPY    Patient: Jennifer Sams MRN: [id number]  : 1945 79 y.o.  Referring Physician: Ney Aparicio,*  Date of Visit: 2024      Certification Dates: 02/15/24 through 24    Recommended Frequency & Duration:  2 times/week for up to 3 months   PN: 2024    Diagnosis:   1. Cerebrovascular accident (CVA), unspecified mechanism (CMS/HCC)    2. Acute ischemic left MCA stroke (CMS/HCC)        Chief Complaints:  Chief Complaint   Patient presents with    Speech Problem     Expressive and receptive language       Precautions:   Precautions Comments: aphasia, apraxia, pacemaker    TODAY'S VISIT:    Session Time:  Start Time: 1604 (pt arrived late.)  Stop Time: 1658  Time Calculation (min): 54 min   General Information - 24 1606          Session Details    Document Type progress note     Mode of Treatment individual therapy        General Information    Onset of Illness/Injury or Date of Surgery 23     Referring Physician Dr. Aparicio     History of present illness/functional impairment  is a 79 y.o. female who presented to [facility] on  after she was not seen by her friends for a few days.  Ms. Sams lives alone and when she was found by EMS she was confused and combative.  In the emergency department she was aphasic with right-sided weakness and hypertensive to the 190 systolic.  CT scan of the head revealed an acute infarct in left MCA with mild bleeding.  Echo showed an EF of 43% and a bubble study was negative.  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 Pt transferred to  [facility] on 2023 Principal problem  Acute ischemic left MCA stroke (CMS/HCC). PMH AAA, heart block s/p cardiac pacemaker, glaucoma, hyperlipidemia, coronary artery disease, She was evaluated by speech therapy and cleared for a soft and bite-size diet with moderately thick  liquids.  7/2/2023:  Diet SB6 with mildly thick liquids (MT2)  7/6/2023: Upgraded to EC7 with thin liquids.  Pt discharged home with 24 hr care and home health services recieving speech therapy for motor speech and aphasia.  Transferred to outpatient and evaluated at [facility] on 8/18/2024.  Ms. Sams presents for her second re-eval at [facility] 2/8/2024..     Precautions Comments aphasia, apraxia, pacemaker     Limitations/Impairments other (see comments)     Interventions Language impairment                    Daily Falls Screen - 04/11/24 1606          Daily Falls Assessment    Patient reported fall since last visit No                    Pain and Vitals - 04/11/24 1606          Pain Assessment    Currently in pain No/Denies                    SLP - 04/11/24 1606          Speech Therapy    Speech Therapy Specialty Traditional Neuro Program ST        SLP Frequency and Duration    Frequency of treatment 2 times/week     Speech Duration 3 months     SLP Custom Frequency and Duration PN: 4/12/2024     SLP Cert From 02/15/24     SLP Cert To 05/08/24     Date SLP POC was sent to provider 02/08/24                    Assessment - 04/11/24 1606          Assessment    Clinical Assessment Continues to present with Moderate to severe motor speech, moderate to severe expressive aphasia and moderate receptive aphasia. Improving in motor speech production in initial position with picture and visual cues.  Improved use of social phrases with intelligibility.  Provided word bank, and/or scrambled words, creates simple sentence structure with grammatical form and approximated content words to convey meaning to listener.  Improved paragraph comprhension. Continue with plan of care     Plan and Recommendations Continue with reading phrase and sentence completion.  Return to building 3-4 word sentences. 2 step commands with adjectives. Introduce /n/ (I) position and return to social phrases.                     OBJECTIVE  "MEASUREMENTS/DATA:    None Taken    Outcome Measures          11/14/2023    10:15 12/14/2023    17:09 2/8/2024    17:02 3/12/2024    20:12   SLP Outcome Measures   FCM: Motor Speech 3-->Level 3       approaching 4 4-->Level 4 4-->Level 4 4-->Level 4   FCM: Reading   4-->Level 4 4-->Level 4   FCM: Spoken Language, Comprehension 4-->Level 4 5-->Level 5 5-->Level 5 5-->Level 5   FCM: Spoken Language, Expression 3-->Level 3       approaching 4 4-->Level 4 4-->Level 4 4-->Level 4       Today's Treatment::     Education provided:  Yes: See treatment log for details of education provided  Methods: Discussion, Handout, and Demonstration  Readiness: acceptance  Response: Needs reinforcement     Today's Treatment:  LANGUAGE SLP FLOW SHEET    SKILL AREA CURRENT SESSION   SPEECH THERAPY: LANGUAGE  CPT 16867 PN: 4/15/2024  POC: 5/8/2024   MOTOR SPEECH  Pt will produce 1-3 syllable words   with /p,b,m,w,t,d,n,s,l/ in initial position 80% of presentations.  PN: 4/11/2024  Improving  /b/  1 syllable: 7/10  2 syllable: 2/2    /p/ direct model  1 syllable: 4/7  2 syllable: 3/4    /m/ direct model  1 syllable: 5/7  2 syllable: 10/13    /t/  auditory bombardment  1 syllable: 5/7  2 syllable:  did not practice.        Pt will repeat familiar greetings and common exchanges provided a direct model, 80% of presentations. PN: 4/11/2024  Improving  Dialogue boxes: single words 15/17- 88% , + one sentence \" I don't want it.\"    4/9/24:  Provided 16 personal questions, F4 written choices, pt able to point to correct answer and repeat some modifications. 15/16.     3/28/2024  Provided 16 personal questions, F4 written choices, pt able to point to correct answer and repeat some modifications. 13/16    3/26/2024   f s d=  Initial position difficulty   Approx   1 syllable: 15/15  2 syllalbe: 5/5    PN: 3/12/2024  Continue   SPOKEN LANG COMP  Pt will demonstrate understanding of complex sentences and short paragraphs with 80% accuracy to y/n " questions.    WALC1: p 191-192 etc 3/26/2024  F4 shapes: provided a short read sentence, pt identified the shape heard embedded in the sentence.    PN: 3/12/2024  MET-Consider increased accuracy to 90% and/or increased length of paragraph   Pt will follow 2 step commands with 4 components, 80% of presentations. PN: 4/11/2024  Improving  2 step directions  2-step:   objects 100%  Adjectives 40%  First/then: 1/3 33%  Adjectives 0/3, 0%  2 adjectives 2/3, 66%  Before/after- in order: 2/3 66%  Before/after- reverse order 2/3 66%  Before/after + Adjectives: 1/3, 33%  Before/after + 2 Adjectives: 1/2, 50%  Before/after +3 Adjectives:; 0/2, 0%    PN: 3/12/2024  Take data   SPOKEN LANG EXP  Provided a descriptive cue and a F6 pictures written words, pt will name the described item, 80% of trials, min A  PN: 4/11/2024  MET   Given simple picture cue pt will produce simple sentences of 3-4 words with simplified grammatical form and approximated content words to convey meaning, Min A, 80% of presentations. 4/4/2024  Provided cards with article, noun and verb, pt practiced building and saying 3 words sentences- Min A to build sentence, max A to say sentences.  Matched words to picture (the boy fishes, the hat flies).  Practiced 3 words on Advanced language ted-  (the family plays, the baby crawls) 7/10, 10/10    PN: 3/12/2024  Continue- reduce word bank   READING  Read phrase completion with F4 MC, 80% PN: 4/11/2024  Improving 70%   Pt will select picture/word for sentence completion, 80% PN: 4/11/2024  Improving 60%       Pt will demonstrate reading comprehension of sentences to brief paragraph- answering multiple choice questions, 80% min A PN: 411/2024  Improving   Match sentence (F3) to picture 4/4  Continue to brief paragraph.     Reading 35-43 words and answered  F2 questions, 100%       Education/Strategy Training 4/11/2024  Reviewed progress this date.     Other            Goals Addressed                   This  "Visit's Progress     SLP Motor Goals          Goals Short-Long Time Frame Result Comment   MOTOR SPEECH  Current:4  Goal:5-6  SPOKEN LANG COMP  Current:5  Goal:  6  SPOKEN LANG EXP:  Current:4   Goal: 5  READING:   Current:4  Goal: 5 LTG 12w  Cont    MOTOR SPEECH: 4  SPOKEN LANG COMP: 5  SPOKEN LANG EXP:4  READIN   Pt will improve Aphasia Severity Rating Scale from 2/5 to >/=3/5 LTG 12 w  Improving  Aphasia Severity Ratin-3/5-           MOTOR SPEECH  Pt will produce 1-3 syllable words   with /p,b,m,w,t,d,n,s,l/ in initial position 80% of presentations.     STG 6 w  PN: 2024  Improving /b/  1 syllable: 7/10  2 syllable: 2/2    /p/ direct model  1 syllable:   2 syllable: 3/4    /m/ direct model  1 syllable:   2 syllable: 10/13    /t/  auditory bombardment  1 syllable:   2 syllable:  did not practice.    Pt will repeat familiar greetings and common exchanges provided a direct model, 80% of presentations.     STG 6 w  PN: 2024  Improving Dialogue boxes: single words 15/17- 88% , + one sentence \" I don't want it.\"  Increase phrase length   SPOKEN LANG COMP  Pt will demonstrate understanding of complex sentences and short paragraphs with 80% accuracy to y/n questions.. STG 4 w  MET PN: 3/12/2024  Full length of paragraph: 80%     Consider increased accuracy to 90% and/or increased length of paragraph    Pt will follow 2 step commands with 4 components, 80% of presentations.     STG 6 w  PN: 2024  Improving    2 step directions  2-step:   objects 100%  Adjectives 40%  First/then: 1/3 33%  Adjectives 0/3, 0%  2 adjectives 2/3, 66%  Before/after- in order: 2/3 66%  Before/after- reverse order 2/3 66%  Before/after + Adjectives: 1/3, 33%  Before/after + 2 Adjectives: 1/2, 50%  Before/after +3 Adjectives:; 0/2, 0%   SPOKEN LANG EXP  Provided a descriptive cue and a F6 pictures written words, pt will name the described item, 80% of trials, min A  STG 4 w  PN: 2024  MET  , , ,  , " with word approximation 1-5 syllables.   Given simple picture cue pt will produce simple sentences of 3-4 words with simplified grammatical form and approximated content words to convey meaning, Min A, 80% of presentations.  STG 6 w  continue, reduce cues  PN: 3/12/2024  Provided word bank to formulate syntax, 2 nouns, non-reversible: 10/10 100%   READING  Pt will match picture to word F4 90% of presentations.    New: Read phrase completion with F4 MC, 80%    STG 4 w  2024  Improving      3/12/2024  MET, revise to picture to phrase  70%       Pt will select picture/word for sentence completion, 80%    STG 6w  2024  Improving  F4 words: Complete the sentences. 60%   Pt will demonstrate reading comprehension of sentences to brief paragraph- answering multiple choice questions, 80% min A STG 8 w  PN:   Improving  Match sentence (F3) to picture   Continue to brief paragraph.     Reading 35-43 words and answered  F2 questions, 100%   Aphasia Severity Ratin-3/5-     2- Conversation about familiar subjects is possible with help from the listener. There are frequent failures to convey the idea, but the patient shares the burden of communication.        3- The patient can discuss almost all everyday problems with little or no assistance.  Reduction of speech and/or comprehension, however, makes conversation about certain material difficult or impossible.        FCM MOTOR SPEECH  L4: In simple structured conversation with familiar communication partners, the individual can produce simple words and phrases intelligibly.  The individual usually requires moderate cueing in order to produce simple sentences intelligibly, although accuracy may vary.         FCM: Spoken Language Expression   L4: The individual is successfully able to initiate communication using spoken language in simple, structured conversation in routine daily activities with familiar communication partners.  The individual usually  requires moderate cueing, but is able to demonstrate use of simple sentences (ie, semantics, syntax, and morphology) and rarely uses complex sentences/messages.        FCM: Spoken Language Comprehension  L5: The individual is able to understand communication in structured conversations with both familiar and unfamiliar communication partners.  The individual occasionally requires minimal cueing to understand more complex sentences/messages.  The individual occasionally initiates the use of compensatory strategies when encountering difficulty.       FCM: Reading  L4: The individual reads words and phrases related to routine daily activities, and words that are less familiar, longer, and more complex.  The individual usually requires moderate cueing to read sentences of approximately 5 and 7 words in length.

## 2024-04-12 NOTE — OP OT TREATMENT LOG
OT NEURO FLOW SHEET    EXERCISES CURRENT SESSION TIME   NEURO RE-ED  CPT 22038 TOTAL TIME FOR SESSION 23-37 Minutes   AAROM/AROM     Strengthening      Strength     Gross Motor Control Patient participated in right hand grasp and hold with simultaneous use of LUE for improved automaticity of left hand    Beach ball toss and volley in standing   - graded up with RUE only, self toss and hit, and underhand toss    Fine Motor Control  Grasp and release with soft theraputty balls, using pincer and tripod grasp at right hand    Grasp and place with wooden pegs into 9 hole pegboard  - Patient progressed in difficulty to picking up from table and placing into board, and able to complete in 2-3 minutes    Patient participated in left hand handwriting activity     Sitting Jennifer/Balance     Standing Jennifer/Balance     Sensory re-ed      Retraining     THERE ACT  CPT 24643 TOTAL TIME FOR SESSION 0-7 Minutes   Pain, Vitals, Meds, Etc. Vitals taken pain assessed and monitored    Assessment     HEP 4/9/24: Reviewed activity log    4/2/24: Patient educated on proper alignment of thumb position to promote pad to pad and reduce excessive opposition and thumb rotation.     3/28/24: Activity log initiated with use of CIMT for improved right hand functioning    Functional Mobility     Transfers     THERE EX  CPT 24139 TOTAL TIME FOR SESSION Not performed   PROM     Activity Tolerance     SELF-CARE  CPT 02319 TOTAL TIME FOR SESSION 8-22 Minutes   Pt Education/Safety     ADL Training Patient participated in food cutting activity with built up fork and knife, using right hand to hold fork and stabilize food while left hand performed cutting. Patient utilized cylindrical full-hand grasp to hold fork. She progressed with coordinated placement of cut putty pieces into container    IADL Training     MODALITIES  CPT 77883 TOTAL TIME FOR SESSION Not performed   Ice/Heat     ATTENDED E-STIM  CPT 70803 TOTAL TIME FOR SESSION Not performed    Attended E-Stim     SPLINTING  CPT 54321 TOTAL TIME FOR SESSION Not performed   Fabrication/Check Out     Fit     Training     GROUP  CPT 19259 TOTAL TIME FOR SESSION Not performed

## 2024-04-12 NOTE — PROGRESS NOTES
Occupational Therapy Visit    OT DAILY NOTE FOR OUTPATIENT THERAPY    Patient: Jennifer Sams   MRN: [id number]  : 1945 79 y.o.  Referring Physician: Ney Aparicio,*  Date of Visit: 2024      Certification Dates: 24 through 24    Diagnosis:   1. Ischemic stroke (CMS/HCC)    2. Lack of coordination        Chief Complaints:   Brain Injury    Precautions:  Existing Precautions/Restrictions: cardiac  Precautions comments: Pacemaker    TODAY'S VISIT    Time In Session:  Start Time: 1705  Stop Time: 1800  Time Calculation (min): 55 min   History/Vitals/Pain/Encounter Info - 24 1703          Injury History/Precautions/Daily Required Info    Document Type daily treatment     Primary Therapist Barry Rosas OTR/CARYN     Chief Complaint/Reason for Visit  Brain Injury     Onset of Illness/Injury or Date of Surgery 23     Referring Physician Dr. Ney Aparicio     Existing Precautions/Restrictions cardiac     Precautions comments Pacemaker     History of present illness/functional impairment The patient is a 78-year-old  female with a history of AAA, heart block s/p cardiac pacemaker, glaucoma, hyperlipidemia, coronary artery disease, NSTEMI, who presented to [facility] on  after she has not been seen by her friends for a few days.  Patient lives alone and when she was found by EMS she was confused and combative.  In the emergency department she was aphasic with right-sided weakness and hypertensive to the 190 systolic.  CT scan of the head revealed an acute infarct in left MCA with mild bleeding.  Echo showed an EF of 43% and a bubble study was negative.  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  Later this was transitioned to apixaban.  She was continued on statin for secondary prevention.  She required virtual shivani while in the hospital but this was not effective.   She was evaluated by speech therapy and cleared for a soft and bite-size diet with moderately thick liquids.'     Patient/Family/Caregiver Comments/Observations Patient with good compliance with activity log activities but did not carry over bilateral eating task     Patient reported fall since last visit No        Pain Assessment    Currently in pain Yes     Preferred Pain Scale number (Numeric Rating Pain Scale)     Pain Rating (0-10): Pre Activity --   LLE (patient unable to specify pain rating)    Pain Rating (0-10): Activity --   RUE (patient unable to specify pain rating)       Pain Interventions    Intervention  monitored, discontinued gross motor control activity     Post Intervention Comments stable with intervention        Pre Activity Vital Signs    /76     BP Location Left upper arm     BP Method Manual     Patient Position Sitting         Services    Do You Speak a Language Other Than English at Home? no                    Daily Treatment Assessment and Plan - 04/11/24 2101          Daily Treatment Assessment and Plan    Progress toward goals Progressing     Daily Outcome Summary Patient arrives to OT with caregiver Thelma and demonstates improvement with food cutting bilaterally, progressing to cutting putty and placing pieces into container with utensils. Patient able to completely fill 9 hole pegboard today demonstrating modified lateral pinch technique with increased accuracy as compared to previous attempts. Patient active participant in [facility] [place] and demonstrates bilateral coordination and improved RUE use with graded challenges.     Plan and Recommendations Continue bilateral coordination and RUE fine motor control, food cutting                         OBJECTIVE DATA TAKEN TODAY    None Taken    Today's Treatment:    OT NEURO FLOW SHEET    EXERCISES CURRENT SESSION TIME   NEURO RE-ED  CPT 05361 TOTAL TIME FOR SESSION 23-37 Minutes   AAROM/AROM     Strengthening      Strength      Gross Motor Control Patient participated in right hand grasp and hold with simultaneous use of LUE for improved automaticity of left hand    Beach ball toss and volley in standing   - graded up with RUE only, self toss and hit, and underhand toss    Fine Motor Control  Grasp and release with soft theraputty balls, using pincer and tripod grasp at right hand    Grasp and place with wooden pegs into 9 hole pegboard  - Patient progressed in difficulty to picking up from table and placing into board, and able to complete in 2-3 minutes    Patient participated in left hand handwriting activity     Sitting Jennifer/Balance     Standing Jennifer/Balance     Sensory re-ed      Retraining     THERE ACT  CPT 11787 TOTAL TIME FOR SESSION 0-7 Minutes   Pain, Vitals, Meds, Etc. Vitals taken pain assessed and monitored    Assessment     HEP 4/9/24: Reviewed activity log    4/2/24: Patient educated on proper alignment of thumb position to promote pad to pad and reduce excessive opposition and thumb rotation.     3/28/24: Activity log initiated with use of CIMT for improved right hand functioning    Functional Mobility     Transfers     THERE EX  CPT 36327 TOTAL TIME FOR SESSION Not performed   PROM     Activity Tolerance     SELF-CARE  CPT 08749 TOTAL TIME FOR SESSION 8-22 Minutes   Pt Education/Safety     ADL Training Patient participated in food cutting activity with built up fork and knife, using right hand to hold fork and stabilize food while left hand performed cutting. Patient utilized cylindrical full-hand grasp to hold fork. She progressed with coordinated placement of cut putty pieces into container    IADL Training     MODALITIES  CPT 05375 TOTAL TIME FOR SESSION Not performed   Ice/Heat     ATTENDED E-STIM  CPT 07677 TOTAL TIME FOR SESSION Not performed   Attended E-Stim     SPLINTING  CPT 98869 TOTAL TIME FOR SESSION Not performed   Fabrication/Check Out     Fit     Training     GROUP  CPT 23996 TOTAL TIME FOR SESSION Not  performed

## 2024-04-23 ENCOUNTER — HOSPITAL ENCOUNTER (OUTPATIENT)
Dept: OCCUPATIONAL THERAPY | Age: 79
Setting detail: THERAPIES SERIES
Discharge: HOME | End: 2024-04-23
Attending: INTERNAL MEDICINE
Payer: MEDICARE

## 2024-04-23 ENCOUNTER — HOSPITAL ENCOUNTER (OUTPATIENT)
Dept: SPEECH THERAPY | Age: 79
Setting detail: THERAPIES SERIES
Discharge: HOME | End: 2024-04-23
Attending: INTERNAL MEDICINE
Payer: MEDICARE

## 2024-04-23 DIAGNOSIS — M62.81 MUSCLE WEAKNESS (GENERALIZED): ICD-10-CM

## 2024-04-23 DIAGNOSIS — I63.9 ISCHEMIC STROKE (CMS/HCC): Primary | ICD-10-CM

## 2024-04-23 DIAGNOSIS — I63.9 CEREBROVASCULAR ACCIDENT (CVA), UNSPECIFIED MECHANISM (CMS/HCC): Primary | ICD-10-CM

## 2024-04-23 DIAGNOSIS — R27.9 LACK OF COORDINATION: ICD-10-CM

## 2024-04-23 PROCEDURE — 97530 THERAPEUTIC ACTIVITIES: CPT | Mod: GO,59

## 2024-04-23 PROCEDURE — 92507 TX SP LANG VOICE COMM INDIV: CPT | Mod: GN

## 2024-04-23 NOTE — OP SLP TREATMENT LOG
" Today's Treatment:  LANGUAGE SLP FLOW SHEET    SKILL AREA CURRENT SESSION   SPEECH THERAPY: LANGUAGE  CPT 29310   POC: 5/8/2024   MOTOR SPEECH  Pt will produce 1-3 syllable words   with /p,b,m,w,t,d,n,s,l/ in initial position 80% of presentations.  PN: 4/11/2024  Improving  /b/  1 syllable: 7/10  2 syllable: 2/2    /p/ direct model  1 syllable: 4/7  2 syllable: 3/4    /m/ direct model  1 syllable: 5/7  2 syllable: 10/13    /t/  auditory bombardment  1 syllable: 5/7  2 syllable:  did not practice.        Pt will repeat familiar greetings and common exchanges provided a direct model, 80% of presentations. PN: 4/11/2024  Improving  Dialogue boxes: single words 15/17- 88% , + one sentence \" I don't want it.\"    4/9/24:  Provided 16 personal questions, F4 written choices, pt able to point to correct answer and repeat some modifications. 15/16.     3/28/2024  Provided 16 personal questions, F4 written choices, pt able to point to correct answer and repeat some modifications. 13/16    3/26/2024   f s d=  Initial position difficulty   Approx   1 syllable: 15/15  2 syllalbe: 5/5    PN: 3/12/2024  Continue   SPOKEN LANG COMP  Pt will demonstrate understanding of complex sentences and short paragraphs with 80% accuracy to y/n questions.    WALC1: p 191-192 etc 3/26/2024  F4 shapes: provided a short read sentence, pt identified the shape heard embedded in the sentence.    PN: 3/12/2024  MET-Consider increased accuracy to 90% and/or increased length of paragraph   Pt will follow 2 step commands with 4 components, 80% of presentations. PN: 4/11/2024  Improving  2 step directions  2-step:   objects 100%  Adjectives 40%  First/then: 1/3 33%  Adjectives 0/3, 0%  2 adjectives 2/3, 66%  Before/after- in order: 2/3 66%  Before/after- reverse order 2/3 66%  Before/after + Adjectives: 1/3, 33%  Before/after + 2 Adjectives: 1/2, 50%  Before/after +3 Adjectives:; 0/2, 0%    PN: 3/12/2024  Take data   SPOKEN LANG EXP  Provided a " "descriptive cue and a F6 pictures written words, pt will name the described item, 80% of trials, min A  PN: 4/11/2024  MET   Given simple picture cue pt will produce simple sentences of 3-4 words with simplified grammatical form and approximated content words to convey meaning, Min A, 80% of presentations. 4/23/2024  1 pronoun, simple sentence: 100%  1 noun, present progressive, 100%    4/4/2024  Provided cards with article, noun and verb, pt practiced building and saying 3 words sentences- Min A to build sentence, max A to say sentences.  Matched words to picture (the boy fishes, the hat flies).  Practiced 3 words on Advanced language ted-  (the family plays, the baby crawls) 7/10, 10/10    PN: 3/12/2024  Continue- reduce word bank   READING  Read phrase completion with F4 , 80% 4/23/2024  Reading phrases- completion, F4: 70%, 90%    PN: 4/11/2024  Improving 70%   Pt will select picture/word for sentence completion, 80% 4/23/2024  Reading sentences- completion, F2: 80%     PN: 4/11/2024  Improving 60%       Pt will demonstrate reading comprehension of sentences to brief paragraph- answering multiple choice questions, 80% min A 4.23.2024  Reading comprehension of 'What\" questions: Min to mod cues to bring attention to key words:  F3. 8/10 (p119- Source for aphasia) Answered open ended questions:   PN: 411/2024  Improving   Match sentence (F3) to picture 4/4  Continue to brief paragraph.     Reading 35-43 words and answered  F2 questions, 100%       Education/Strategy Training 4/11/2024  Reviewed progress this date.     Other 4/23/2024  Spelling: 3-4 letters- unscramble 7/10        "

## 2024-04-23 NOTE — PROGRESS NOTES
"Speech-Language Pathology Visit      SLP DAILY NOTE FOR OUTPATIENT THERAPY    Patient: Jennifer Sams   MRN: [id number]  : 1945 79 y.o.  Referring Physician: Ney Aparicio,*  Date of Visit: 2024      Certification Dates: 02/15/24 through 24    Diagnosis:   1. Cerebrovascular accident (CVA), unspecified mechanism (CMS/HCC)        Chief Complaints:  difficulty communicating    Precautions:  Precautions Comments: aphasia, apraxia, pacemaker       TODAY'S VISIT:    Session Time:  Start Time: 1602  Stop Time: 1701  Time Calculation (min): 59 min   History/Vitals/Pain/Encounter Info - 24 1604          Injury History/Precautions/Daily Required Info    Primary Therapist Magnolia Lundy MS, CCC/SLP     Chief Complaint/Reason for Visit  difficulty communicating     Onset of Illness/Injury or Date of Surgery 23     Referring Physician Dr. Aparicio     Precautions Comments aphasia, apraxia, pacemaker     Document Type daily treatment     Patient/Family/Caregiver Comments/Observations Pt arrived with sunglasses to disguise her tears.     Patient reported fall since last visit No        Pain Assessment    Currently in pain No/Denies     Preferred Pain Scale number (Numeric Rating Pain Scale)        Pain Interventions    Intervention none     Post Intervention Comments none                    Daily Treatment Assessment and Plan - 24 1604          Daily Treatment Assessment and Plan    Progress toward goals Progressing     Daily Outcome Summary Pt preferred to not speak very much this date due to feeling sad. Practice reading spelling and sentence structure this date. Pt was more likely to practice word retrieval and speech when aswering personal questions. Pt was very happy when she could consistently answer a question with \"\"     Plan and Recommendations Introduce bombardment, isolation of /n/ and /l/ for visual cues. Dialogue boxes.                      OBJECTIVE " "MEASUREMENTS TAKEN TODAY:    None Taken    Today's Treatment:     Today's Treatment:  LANGUAGE SLP FLOW SHEET    SKILL AREA CURRENT SESSION   SPEECH THERAPY: LANGUAGE  CPT 50264   POC: 5/8/2024   MOTOR SPEECH  Pt will produce 1-3 syllable words   with /p,b,m,w,t,d,n,s,l/ in initial position 80% of presentations.  PN: 4/11/2024  Improving  /b/  1 syllable: 7/10  2 syllable: 2/2    /p/ direct model  1 syllable: 4/7  2 syllable: 3/4    /m/ direct model  1 syllable: 5/7  2 syllable: 10/13    /t/  auditory bombardment  1 syllable: 5/7  2 syllable:  did not practice.        Pt will repeat familiar greetings and common exchanges provided a direct model, 80% of presentations. PN: 4/11/2024  Improving  Dialogue boxes: single words 15/17- 88% , + one sentence \" I don't want it.\"    4/9/24:  Provided 16 personal questions, F4 written choices, pt able to point to correct answer and repeat some modifications. 15/16.     3/28/2024  Provided 16 personal questions, F4 written choices, pt able to point to correct answer and repeat some modifications. 13/16    3/26/2024   f s d=  Initial position difficulty   Approx   1 syllable: 15/15  2 syllalbe: 5/5    PN: 3/12/2024  Continue   SPOKEN LANG COMP  Pt will demonstrate understanding of complex sentences and short paragraphs with 80% accuracy to y/n questions.    WALC1: p 191-192 etc 3/26/2024  F4 shapes: provided a short read sentence, pt identified the shape heard embedded in the sentence.    PN: 3/12/2024  MET-Consider increased accuracy to 90% and/or increased length of paragraph   Pt will follow 2 step commands with 4 components, 80% of presentations. PN: 4/11/2024  Improving  2 step directions  2-step:   objects 100%  Adjectives 40%  First/then: 1/3 33%  Adjectives 0/3, 0%  2 adjectives 2/3, 66%  Before/after- in order: 2/3 66%  Before/after- reverse order 2/3 66%  Before/after + Adjectives: 1/3, 33%  Before/after + 2 Adjectives: 1/2, 50%  Before/after +3 Adjectives:; 0/2, " "0%    PN: 3/12/2024  Take data   SPOKEN LANG EXP  Provided a descriptive cue and a F6 pictures written words, pt will name the described item, 80% of trials, min A  PN: 4/11/2024  MET   Given simple picture cue pt will produce simple sentences of 3-4 words with simplified grammatical form and approximated content words to convey meaning, Min A, 80% of presentations. 4/23/2024  1 pronoun, simple sentence: 100%  1 noun, present progressive, 100%    4/4/2024  Provided cards with article, noun and verb, pt practiced building and saying 3 words sentences- Min A to build sentence, max A to say sentences.  Matched words to picture (the boy fishes, the hat flies).  Practiced 3 words on Advanced language ted-  (the family plays, the baby crawls) 7/10, 10/10    PN: 3/12/2024  Continue- reduce word bank   READING  Read phrase completion with F4 , 80% 4/23/2024  Reading phrases- completion, F4: 70%, 90%    PN: 4/11/2024  Improving 70%   Pt will select picture/word for sentence completion, 80% 4/23/2024  Reading sentences- completion, F2: 80%     PN: 4/11/2024  Improving 60%       Pt will demonstrate reading comprehension of sentences to brief paragraph- answering multiple choice questions, 80% min A 4.23.2024  Reading comprehension of 'What\" questions: Min to mod cues to bring attention to key words:  F3. 8/10 (p119- Source for aphasia) Answered open ended questions:   PN: 411/2024  Improving   Match sentence (F3) to picture 4/4  Continue to brief paragraph.     Reading 35-43 words and answered  F2 questions, 100%       Education/Strategy Training 4/11/2024  Reviewed progress this date.     Other 4/23/2024  Spelling: 3-4 letters- unscramble 7/10                                  "

## 2024-04-23 NOTE — PROGRESS NOTES
Occupational Therapy Progress Note    OT PROGRESS NOTE FOR OUTPATIENT THERAPY    Patient: Jennifer Sams MRN: [id number]  : 1945 79 y.o.  Referring Physician: Ney Aparicio,*  Date of Visit: 2024      Certification Dates:   24 through 24    Recommended Frequency & Duration:  2 times/week for up to 8 weeks        Diagnosis:   1. Ischemic stroke (CMS/HCC)    2. Lack of coordination    3. Muscle weakness (generalized)        Chief Complaints:  No chief complaint on file.      Precautions:  Existing Precautions/Restrictions: cardiac  Precautions comments: Pacemaker    TODAY'S VISIT:    Time In Session:  Start Time: 1705  Stop Time: 1733 (Patient left OT session due to increased emotional distress preventing further participation in session)  Time Calculation (min): 28 min   General Information - 24 1711          Session Details    Document Type progress note        General Information    Onset of Illness/Injury or Date of Surgery 23     Referring Physician Dr. Ney Aparicio     History of present illness/functional impairment The patient is a 78-year-old  female with a history of AAA, heart block s/p cardiac pacemaker, glaucoma, hyperlipidemia, coronary artery disease, NSTEMI, who presented to [facility] on  after she has not been seen by her friends for a few days.  Patient lives alone and when she was found by EMS she was confused and combative.  In the emergency department she was aphasic with right-sided weakness and hypertensive to the 190 systolic.  CT scan of the head revealed an acute infarct in left MCA with mild bleeding.  Echo showed an EF of 43% and a bubble study was negative.  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  Later this was transitioned to apixaban.  She was continued on statin for secondary prevention.  She required virtual shivani while in  the hospital but this was not effective.  She was evaluated by speech therapy and cleared for a soft and bite-size diet with moderately thick liquids.'     Patient/Family/Caregiver Comments/Observations Patient arrives displaying increased frustration and emotion     Existing Precautions/Restrictions cardiac     Precautions comments Pacemaker         Services    Do You Speak a Language Other Than English at Home? no                      Pain/Vitals - 04/23/24 1711          Pain Assessment    Currently in pain No/Denies                    OT - 04/23/24 1924          Occupational Therapy Encounter Type Details    Occupational Therapy Specialty Traditional Neuro Program OT        OT Frequency and Duration    Frequency of treatment 2 times/week     OT Duration 8 weeks     OT Cert From 03/26/24     OT Cert To 05/21/24     Date OT POC was sent to provider 03/26/24     Signed OT Plan of Care received?  Yes                    Assessment - 04/23/24 1928          Assessment    Plan of Care reviewed and patient/family in agreement Yes     System Pathology/Pathophysiology Noted neuromuscular     Functional Limitations in Following Categories self-care;home management;community/leisure     Problem List: Occupational Therapy coordination impaired;impaired cognition;sensation decreased     Rehab Potential/Prognosis: Occupational Therapy good, to achieve stated therapy goals     Clinical Assessment Patient was referred to outpatient Occupational Therapy s/p CVA and subsequent RUE hemiparesis, and progress evaluation was initiated today. Patient completed initial evaluation on 8/16/2024 and has participated in 42 Occupational Therapy sessions, including this visit. She was discharged from Physical Therapy and continues to participate in Speech Language Pathology. Patient continues to live alone with an assistance who comes 2x per week to assist with home management and socialization. Her friend and previous co-worker  serves as her power of  and is present to assist in interview due to patient's aphasia. Patient continues to complete dressing, toileting and bathing with modified independence. She has improved to shoe typing with 75-80% sucess, and now able to wash her hair independently. She is still very limited in meal preparation and cutting food, but has increased carry over with attempting to perform food cutting with this being a focus of recent OT sessions. ROM at right shoulder is more limited today as compared to measurements taken at last evaluation likely due to pain provoked with flexion and abduction; patient has upcoming medical appt with PCP and therapist encouraged her to discuss persistent pain symptoms with provider; Caregiver expressed good understanding. Box and Block test at right hand is 21 blocks which is improved from 15 at last testing. Right hand  strength was 11 lbs today (tested once) which is improved from 10 lbs at last testing. Further testing was not conducted today as patient presented with emotional distress including intermittent crying, limited completion of evaluation. Additional testing including fine motor control will be completed next session. Patient appears to be compliant with CIMT promoting increased functional use of RUE with routine tasks at home. Recommend continued OT for improved functional use of RUE and improved engagement with preferred occupations.     Plan and Recommendations Complete fine motor control testing. Plan out simple meal for improved independence with meal prep                     OBJECTIVE MEASUREMENTS/DATA:    Range of Motion     PROM/AROM - 04/23/24 1700          RIGHT: Upper Extremity AROM Assessment    Shoulder Flexion   135 degrees     Shoulder Abduction   91 degrees   limited by pain    Shoulder Internal Rotation   --   WFL                  BADL/IADL    BADL/IADL - 04/23/24 1712          BADL Interventions Assessment    Upper Body Dressing  Modified independence     Lower Body Dressing Modified independence     Lower body dressing comments Patient now able to tie shoe laces 75-80%     Bathing Modified independence     Bathing comments Now washing hair 100% of the time, using shower chair and grab bars     Toileting Independent     Grooming Modified independence     Grooming comments Usually automatic toothbrush. Able to brush hair but difficulty with blow drying hair     Eating Modified independence     Eating comments Difficulty with cutting food        IADL/Home Interventions Assessment    Care of Others Minimum assist (75% patient effort)     Care of Others comments Getting help with opening wet food     Driving and community mobility Dependent (less than 25% patient effort)     Financial management Dependent (less than 25% patient effort)     Financial management comments Friend/POA manages finances.     Medication management Independent     Medication management comments Able to sort medications into weekly pill sorter and takes.     Home management/maintenance Independent     Home management/maintenance comments Laundry, light cleaning, washing dishes, organizing/straightening up     Meal prep / clean up Dependent (less than 25% patient effort)     Other (comments) Home health aid comes 2 times per week and performs light housekeeping, reminder about medication, and makes some meals                   Gross and Fine Motor     Gross and Fine Motor - 04/23/24 1712          Hand  Strength Testing    Right Hand, Setting 2 11 lbs                   Outcome Measures    Outcome Measures - 04/23/24 1712          Objective Outcome Measures    RIGHT hand: Box and Blocks Assessment 21                     ROM and MMT          11/28/2023    16:29 1/4/2024    19:06 1/11/2024    19:18 1/18/2024    17:11 3/26/2024    14:00 4/23/2024    17:00   OT UE ROM Measurements   AROM: Right Shoulder Flexion     150 degrees 135 degrees   AROM: Right Shoulder ABD     135  degrees       unable to move through full sagittal pain 91 degrees       limited by pain   AROM: Right Shoulder IR     20 degrees       hard end feel and pain --        WFL   AROM: Right Shoulder ER     --        WFL    AROM: Right Elbow Flex/Ext     WFL    AROM: Right Wrist Flexion     60 degrees    AROM: Right Wrist Extension     55 degrees    AROM: Right Wrist UD     50 degrees    AROM: Right Wrist RD     20 degrees    OT Cervical/Lumbar/Other ROM Measurements   Other: Girth Measurement/Comments  Right IF: P1-5.9 cm, PIP- 6cm, P2- 5.3 cm, DIP- 5.1 cm // Left IF: P1-5.6 cm, PIP-5.5 cm, P2- 4.9 cm, DIP-4.9 cm // Right [employer]- 17.5 cm Right IF: P1-5.8 cm, PIP- 5.9cm, P2- 5.1 cm, DIP- 4.9 // Right [employer]- 17.5 cm Right IF: P1-5.9 cm, PIP- 5.9cm, P2- 5.2 cm, DIP- 4.9 // Right [employer]- 17.5 cm          .5 cm MF to DPC     OT UE MMT   Left Shoulder Flexion (4+/5) good plus        Left Shoulder Extension (4+/5) good plus        Left Shoulder ADD (4+/5) good plus        Left Shoulder ABD (4+/5) good plus        Left Shoulder IR (4+/5) good plus        Left Shoulder ER (4+/5) good plus        Left Elbow Flexion (4+/5) good plus        Left Elbow Extension (4+/5) good plus        Left Forearm Supination (4+/5) good plus        Left Forearm Pronation (4+/5) good plus        Left Wrist Flexion (4/5) good        Left Wrist Extension (4/5) good        Left Wrist UD (4/5) good        Left Wrist RD (4/5) good          Outcome Measures          11/14/2023    14:20 12/14/2023    17:12 1/18/2024    17:11 2/6/2024    16:21 3/26/2024    14:10 4/23/2024    17:12   OT OBJECTIVE Outcome Measures   9 Hole Peg Test - Right Hand 16.65       Removing only using lateral pinch 180       3 pegs placed 180       14.82- removal only 180       Removal only 32.46     9 Hole Peg Test - Left Hand 24.06  25.84       removing only      9 Hole Peg Test - Comments   Patient able to place 5 pegs in pegboard in 3 minutes at right hand Patient  placed 3 pegs into board today in 3 minutes     Right Hand Box and Blocks 53 24 23 23 15 21   Left Hand Box and Blocks 28    48    Other Outcome Measure Right MF to DPC = 1.5 cm            Today's Treatment::    Education provided:  None/NA    OT NEURO FLOW SHEET    EXERCISES CURRENT SESSION TIME   Progress evaluation 4/23/24    NEURO RE-ED  CPT 71496 TOTAL TIME FOR SESSION Not performed   AAROM/AROM     Strengthening      Strength     Gross Motor Control     Fine Motor Control     Sitting Jennifer/Balance     Standing Jennifer/Balance     Sensory re-ed      Retraining     THERE ACT  CPT 61089 TOTAL TIME FOR SESSION 23-37 Minutes   Pain, Vitals, Meds, Etc. pain assessed and monitored    Assessment Patient participated in OT progress evaluation but only able to complete about 50% of evaluation today due to increased emotional distress, causing OT session to become shortened.    HEP 4/23/24: Reviewed activity log  4/9/24: Reviewed activity log    4/2/24: Patient educated on proper alignment of thumb position to promote pad to pad and reduce excessive opposition and thumb rotation.     3/28/24: Activity log initiated with use of CIMT for improved right hand functioning    Functional Mobility     Transfers     THERE EX  CPT 04178 TOTAL TIME FOR SESSION Not performed   PROM     Activity Tolerance     SELF-CARE  CPT 83054 TOTAL TIME FOR SESSION Not performed   Pt Education/Safety     ADL Training     IADL Training     MODALITIES  CPT 05736 TOTAL TIME FOR SESSION Not performed   Ice/Heat     ATTENDED E-STIM  CPT 52869 TOTAL TIME FOR SESSION Not performed   Attended E-Stim     SPLINTING  CPT 13826 TOTAL TIME FOR SESSION Not performed   Fabrication/Check Out     Fit     Training     GROUP  CPT 26902 TOTAL TIME FOR SESSION Not performed                 Goals Addressed                   This Visit's Progress     OT Neuro/Deconditioning Goals          Short Term Goals Time Frame Result Comment/Progress   Assess gross motor control  via Box and Block Assessment  4 weeks Met 9/14: R: 22; L: 56   Assess fine motor control via 9 hole peg test 4 weeks Met  9/14: R: Unable; L: 24.55   Improved automatic fine motor use at right hand with routine tasks (e.g. eating, writing, brushing teeth) to 25% of baseline  4 weeks Ongoing     Explore adaptive visual strategies to encourage looking to right side 4 weeks Met    Incorporate right hand in at least 2 self-care activities 4 weeks Met    Carry over HEP at least 3 times per week  4 weeks Met     Score at least 32 on Box and block test 4 weeks Ongoing 2/7/24: 23 blocks  3/26/24: 15 blocks  4/23/24: 21 blocks   Increase right hand  strength to 8 lbs 4 weeks Met    Engage in bilateral fine motor coordination task to open food or self-care containers with supervision during 4/6 trials for increased independence with meal preparation.  4 weeks New goal    Complete at least 4 activities from RUE modified constraint induced movement therapy activity log across 3 weeks with assistance as needed. 4 weeks Met    Engage in simulated or real life food cutting activity using bilateral hands with no more than minimal assistance x 2 trials.  4 weeks Met    Perform RUE scapular internal rotation through at least 45 degrees with no more than 1 point increase in pain in order to promote joint mobilization and reduce scapular adhesion.  4 weeks New goal RE 2/26/24: 20 degrees, hard end feel and pain   Complete right hand three jaw patti or tip pinch activity to retrieve 1x1 inch block and place in target area with short opponens splint donned or manual inhibition of excessive opposition x 10 repetitions across 2 trials for improved coordination 4 weeks New goal       Long Term Goals Time Frame Result Comment/Progress   Improve gross motor control as evidenced by at least 10 block improved at RUE with Box and Block Assessment for improved ability to perform ADLs and IADLs  12 weeks Met     Patient will complete 9 hole  peg test in under 3 minutes 12 weeks Ongoing    Decrease distance from Right MF to DPC to 2 cm for improved functional grasp at right hand 12 weeks Met     Improved automatic fine motor use at right hand with routine tasks (e.g. eating, writing, brushing teeth) to 75% of baseline 12 weeks Ongoing     Increase right hand  strength to at least 15 lbs 12 weeks Ongoing 3/26/24: 6 lbs  4/23/24: 11 lbs   Increase Box and Block score to at least 40 12 weeks Ongoing 2/7/24: 23 blocks  3/26/24: 15 blocks

## 2024-04-23 NOTE — OP OT TREATMENT LOG
OT NEURO FLOW SHEET    EXERCISES CURRENT SESSION TIME   Progress evaluation 4/23/24    NEURO RE-ED  CPT 30490 TOTAL TIME FOR SESSION Not performed   AAROM/AROM     Strengthening      Strength     Gross Motor Control     Fine Motor Control     Sitting Jennifer/Balance     Standing Jennifer/Balance     Sensory re-ed      Retraining     THERE ACT  CPT 22119 TOTAL TIME FOR SESSION 23-37 Minutes   Pain, Vitals, Meds, Etc. pain assessed and monitored    Assessment Patient participated in OT progress evaluation but only able to complete about 50% of evaluation today due to increased emotional distress, causing OT session to become shortened.    HEP 4/23/24: Reviewed activity log  4/9/24: Reviewed activity log    4/2/24: Patient educated on proper alignment of thumb position to promote pad to pad and reduce excessive opposition and thumb rotation.     3/28/24: Activity log initiated with use of CIMT for improved right hand functioning    Functional Mobility     Transfers     THERE EX  CPT 31778 TOTAL TIME FOR SESSION Not performed   PROM     Activity Tolerance     SELF-CARE  CPT 46768 TOTAL TIME FOR SESSION Not performed   Pt Education/Safety     ADL Training     IADL Training     MODALITIES  CPT 72836 TOTAL TIME FOR SESSION Not performed   Ice/Heat     ATTENDED E-STIM  CPT 02288 TOTAL TIME FOR SESSION Not performed   Attended E-Stim     SPLINTING  CPT 45916 TOTAL TIME FOR SESSION Not performed   Fabrication/Check Out     Fit     Training     GROUP  CPT 00845 TOTAL TIME FOR SESSION Not performed

## 2024-04-25 ENCOUNTER — HOSPITAL ENCOUNTER (OUTPATIENT)
Dept: OCCUPATIONAL THERAPY | Age: 79
Setting detail: THERAPIES SERIES
Discharge: HOME | End: 2024-04-25
Attending: INTERNAL MEDICINE
Payer: MEDICARE

## 2024-04-25 ENCOUNTER — HOSPITAL ENCOUNTER (OUTPATIENT)
Dept: SPEECH THERAPY | Age: 79
Setting detail: THERAPIES SERIES
Discharge: HOME | End: 2024-04-25
Attending: INTERNAL MEDICINE
Payer: MEDICARE

## 2024-04-25 DIAGNOSIS — R27.9 LACK OF COORDINATION: ICD-10-CM

## 2024-04-25 DIAGNOSIS — M62.81 MUSCLE WEAKNESS (GENERALIZED): ICD-10-CM

## 2024-04-25 DIAGNOSIS — I63.9 CEREBROVASCULAR ACCIDENT (CVA), UNSPECIFIED MECHANISM (CMS/HCC): Primary | ICD-10-CM

## 2024-04-25 DIAGNOSIS — I63.9 ISCHEMIC STROKE (CMS/HCC): Primary | ICD-10-CM

## 2024-04-25 PROCEDURE — 97010 HOT OR COLD PACKS THERAPY: CPT | Mod: GO

## 2024-04-25 PROCEDURE — 97112 NEUROMUSCULAR REEDUCATION: CPT | Mod: GO,59

## 2024-04-25 PROCEDURE — 92507 TX SP LANG VOICE COMM INDIV: CPT | Mod: GN

## 2024-04-25 NOTE — OP SLP TREATMENT LOG
" Today's Treatment:  LANGUAGE SLP FLOW SHEET    SKILL AREA CURRENT SESSION   SPEECH THERAPY: LANGUAGE  CPT 30655   POC: 5/8/2024   MOTOR SPEECH  Pt will produce 1-3 syllable words   with /p,b,m,w,t,d,n,s,l/ in initial position 80% of presentations.  4/25/20214  /n/ 1 syllable : DM: 8/10 (I- Mod A)    PN: 4/11/2024  Improving  /b/  1 syllable: 7/10  2 syllable: 2/2    /p/ direct model  1 syllable: 4/7  2 syllable: 3/4    /m/ direct model  1 syllable: 5/7  2 syllable: 10/13    /t/  auditory bombardment  1 syllable: 5/7  2 syllable:  did not practice.        Pt will repeat familiar greetings and common exchanges provided a direct model, 80% of presentations. 4/25/20214  Dialogue boxes:  Single words: Supervision  Two words: Mod A  3 words: Max A (phrase completions)     PN: 4/11/2024  Improving  Dialogue boxes: single words 15/17- 88% , + one sentence \" I don't want it.\"    4/9/24:  Provided 16 personal questions, F4 written choices, pt able to point to correct answer and repeat some modifications. 15/16.     3/28/2024  Provided 16 personal questions, F4 written choices, pt able to point to correct answer and repeat some modifications. 13/16    3/26/2024   f s d=  Initial position difficulty   Approx   1 syllable: 15/15  2 syllalbe: 5/5    PN: 3/12/2024  Continue   SPOKEN LANG COMP  Pt will demonstrate understanding of complex sentences and short paragraphs with 80% accuracy to y/n questions.    WALC1: p 191-192 etc 3/26/2024  F4 shapes: provided a short read sentence, pt identified the shape heard embedded in the sentence.    PN: 3/12/2024  MET-Consider increased accuracy to 90% and/or increased length of paragraph   Pt will follow 2 step commands with 4 components, 80% of presentations. PN: 4/11/2024  Improving  2 step directions  2-step:   objects 100%  Adjectives 40%  First/then: 1/3 33%  Adjectives 0/3, 0%  2 adjectives 2/3, 66%  Before/after- in order: 2/3 66%  Before/after- reverse order 2/3 66%  Before/after + " "Adjectives: 1/3, 33%  Before/after + 2 Adjectives: 1/2, 50%  Before/after +3 Adjectives:; 0/2, 0%    PN: 3/12/2024  Take data   SPOKEN LANG EXP  Provided a descriptive cue and a F6 pictures written words, pt will name the described item, 80% of trials, min A  PN: 4/11/2024  MET   Given simple picture cue pt will produce simple sentences of 3-4 words with simplified grammatical form and approximated content words to convey meaning, Min A, 80% of presentations. 4/23/2024  1 pronoun, simple sentence: 100%  1 noun, present progressive, 100%    4/4/2024  Provided cards with article, noun and verb, pt practiced building and saying 3 words sentences- Min A to build sentence, max A to say sentences.  Matched words to picture (the boy fishes, the hat flies).  Practiced 3 words on Advanced language ted-  (the family plays, the baby crawls) 7/10, 10/10    PN: 3/12/2024  Continue- reduce word bank   READING  Read phrase completion with F4 , 80% 4/23/2024  Reading phrases- completion, F4: 70%, 90%    PN: 4/11/2024  Improving 70%   Pt will select picture/word for sentence completion, 80% 4/23/2024  Reading sentences- completion, F2: 80%     PN: 4/11/2024  Improving 60%       Pt will demonstrate reading comprehension of sentences to brief paragraph- answering multiple choice questions, 80% min A 4.23.2024  Reading comprehension of 'What\" questions: Min to mod cues to bring attention to key words:  F3. 8/10 (p119- Source for aphasia) Answered open ended questions:   PN: 411/2024  Improving   Match sentence (F3) to picture 4/4  Continue to brief paragraph.     Reading 35-43 words and answered  F2 questions, 100%       Education/Strategy Training 4/11/2024  Reviewed progress this date.     Other 4/23/2024  Spelling: 3-4 letters- unscramble 7/10        "

## 2024-04-25 NOTE — PROGRESS NOTES
Occupational Therapy Visit    OT DAILY NOTE FOR OUTPATIENT THERAPY    Patient: Jennifer Sams   MRN: [id number]  : 1945 79 y.o.  Referring Physician: Ney Aparicio,*  Date of Visit: 2024      Certification Dates: 24 through 24    Diagnosis:   1. Ischemic stroke (CMS/HCC)    2. Lack of coordination    3. Muscle weakness (generalized)        Chief Complaints:   Brain Injury    Precautions:  Existing Precautions/Restrictions: cardiac  Precautions comments: Pacemaker    TODAY'S VISIT    Time In Session:  Start Time: 1701  Stop Time: 1800  Time Calculation (min): 59 min   History/Vitals/Pain/Encounter Info - 24 1703          Injury History/Precautions/Daily Required Info    Document Type daily treatment     Primary Therapist Barry Rosas OTR/L     Chief Complaint/Reason for Visit  Brain Injury     Onset of Illness/Injury or Date of Surgery 23     Referring Physician Dr. Ney Aparicio     Existing Precautions/Restrictions cardiac     Precautions comments Pacemaker     History of present illness/functional impairment The patient is a 78-year-old  female with a history of AAA, heart block s/p cardiac pacemaker, glaucoma, hyperlipidemia, coronary artery disease, NSTEMI, who presented to [facility] on  after she has not been seen by her friends for a few days.  Patient lives alone and when she was found by EMS she was confused and combative.  In the emergency department she was aphasic with right-sided weakness and hypertensive to the 190 systolic.  CT scan of the head revealed an acute infarct in left MCA with mild bleeding.  Echo showed an EF of 43% and a bubble study was negative.  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  Later this was transitioned to apixaban.  She was continued on statin for secondary prevention.  She required virtual shivani while in the  hospital but this was not effective.  She was evaluated by speech therapy and cleared for a soft and bite-size diet with moderately thick liquids.'     Patient/Family/Caregiver Comments/Observations Patient arrives with complaint of RLE pain at shin. Saw MD who reported muscle tightness but no injury found.     Patient reported fall since last visit No        Pain Assessment    Currently in pain Yes     Preferred Pain Scale FACES (Field-Ware FACES Pain Rating Scale)     Pain Side/Orientation right     Pain: Body location Leg     FACES Pain Rating: Rest 4-->hurts little more     FACES Pain Rating: Activity 2-->hurts little bit     FACES Pain Rating: Activity 2-->hurts little bit        Pain Interventions    Intervention  Monitored, applied ice x 15 minutes     Post Intervention Comments slightly reduced         Services    Do You Speak a Language Other Than English at Home? no                    Daily Treatment Assessment and Plan - 04/25/24 1811          Daily Treatment Assessment and Plan    Progress toward goals Progressing     Daily Outcome Summary Patient continued engagement in RUE fine motor tasks with focus on motor control during three jaw patti and lateral pinch grasping. She exhibited apraxia and sensory deficits that limited performance, but patient with increased persistent to continue through several trials. Engaged in hand writing and spelling activity with adequate letter formation but continued aphasia hindering accuracy.     Plan and Recommendations Complete fine motor control testing. Plan out simple meal for improved independence with meal prep                         OBJECTIVE DATA TAKEN TODAY    None Taken    Today's Treatment:    OT NEURO FLOW SHEET    EXERCISES CURRENT SESSION TIME   NEURO RE-ED  CPT 24422 TOTAL TIME FOR SESSION 53-67 Minutes   AAROM/AROM     Strengthening Medium resistance flex bar x 2 sets x 10 repetitions:   -pronation  -radial deviation      Strength      Gross Motor Control     Fine Motor Control Three jaw patti with over top approach to place and move large pegs with moderate assist progressing to minimal assistance for proper grasp as patient attempting to transition to cylindrical grasp.     Engaged in right hand handwriting training with refusal of built up handle. Able to write first name x 3 trials with 80% legibility.    Patient used left hand to write word provided verbally x 22 words. Patient able to spell/write 5 words without visual guide.     Sitting Jennifer/Balance     Standing Jennifer/Balance      Retraining     THERE ACT  CPT 90637 TOTAL TIME FOR SESSION 0-7 Minutes   Pain, Vitals, Meds, Etc. Monitored    HEP     Functional Mobility     Transfers     THERE EX  CPT 37615 TOTAL TIME FOR SESSION Not performed   PROM     Activity Tolerance     SELF-CARE  CPT 60023 TOTAL TIME FOR SESSION Not performed   Pt Education/Safety     ADL Training     IADL Training     MODALITIES  CPT 30009 TOTAL TIME FOR SESSION Not performed   Ice/Heat     ATTENDED E-STIM  CPT 29176 TOTAL TIME FOR SESSION Not performed   Attended E-Stim     SPLINTING  CPT 05032 TOTAL TIME FOR SESSION Not performed   Fabrication/Check Out     Fit     Training     GROUP  CPT 46929 TOTAL TIME FOR SESSION Not performed

## 2024-04-25 NOTE — PROGRESS NOTES
Speech-Language Pathology Visit      SLP DAILY NOTE FOR OUTPATIENT THERAPY    Patient: Jennifer Sams   MRN: [id number]  : 1945 79 y.o.  Referring Physician: Ney Aparicio,*  Date of Visit: 2024      Certification Dates: 02/15/24 through 24    Diagnosis:   1. Cerebrovascular accident (CVA), unspecified mechanism (CMS/HCC)        Chief Complaints:  difficulty communicating    Precautions:  Precautions Comments: aphasia, apraxia, pacemaker       TODAY'S VISIT:    Session Time:  Start Time: 1607 (pt arrived late)  Stop Time: 1700  Time Calculation (min): 53 min   History/Vitals/Pain/Encounter Info - 24 1611          Injury History/Precautions/Daily Required Info    Primary Therapist Magnolia Lundy MS, CCC/SLP     Chief Complaint/Reason for Visit  difficulty communicating     Onset of Illness/Injury or Date of Surgery 23     Referring Physician Dr. Aparicio     Precautions Comments aphasia, apraxia, pacemaker     Limitations/Impairments other (see comments)     Document Type daily treatment     Patient/Family/Caregiver Comments/Observations Pt arrived with pain in right leg (shin) pt reports all of a sudden.  POA, Thelma reports saw doctor and not notable issue determined.     Patient reported fall since last visit No        Pain Assessment    Currently in pain Yes     Preferred Pain Scale number (Numeric Rating Pain Scale)     Pain Side/Orientation right     Pain: Body location Leg     FACES Pain Rating: Rest 8-->hurts whole lot        Pain Interventions    Intervention does not hurt while sitting.     Post Intervention Comments none                    Daily Treatment Assessment and Plan - 24 1611          Daily Treatment Assessment and Plan    Progress toward goals Progressing     Daily Outcome Summary Pt was able to produce /n/ in single syllables with direct model and repetition this date.  Practiced social phrases of 1,2,3 words phrases- pt became fatigued and  "reduced ability to say sentences.     Plan and Recommendations /l/ initial position.  /n/ single syllable and 2 syllable practice with direct model.  Continue with phrase fill ins and dialogue boxes.                      OBJECTIVE MEASUREMENTS TAKEN TODAY:    None Taken    Today's Treatment:     Today's Treatment:  LANGUAGE SLP FLOW SHEET    SKILL AREA CURRENT SESSION   SPEECH THERAPY: LANGUAGE  CPT 14868   POC: 5/8/2024   MOTOR SPEECH  Pt will produce 1-3 syllable words   with /p,b,m,w,t,d,n,s,l/ in initial position 80% of presentations.  4/25/20214  /n/ 1 syllable : DM: 8/10 (I- Mod A)    PN: 4/11/2024  Improving  /b/  1 syllable: 7/10  2 syllable: 2/2    /p/ direct model  1 syllable: 4/7  2 syllable: 3/4    /m/ direct model  1 syllable: 5/7  2 syllable: 10/13    /t/  auditory bombardment  1 syllable: 5/7  2 syllable:  did not practice.        Pt will repeat familiar greetings and common exchanges provided a direct model, 80% of presentations. 4/25/20214  Dialogue boxes:  Single words: Supervision  Two words: Mod A  3 words: Max A (phrase completions)     PN: 4/11/2024  Improving  Dialogue boxes: single words 15/17- 88% , + one sentence \" I don't want it.\"    4/9/24:  Provided 16 personal questions, F4 written choices, pt able to point to correct answer and repeat some modifications. 15/16.     3/28/2024  Provided 16 personal questions, F4 written choices, pt able to point to correct answer and repeat some modifications. 13/16    3/26/2024   f s d=  Initial position difficulty   Approx   1 syllable: 15/15  2 syllalbe: 5/5    PN: 3/12/2024  Continue   SPOKEN LANG COMP  Pt will demonstrate understanding of complex sentences and short paragraphs with 80% accuracy to y/n questions.    WALC1: p 191-192 etc 3/26/2024  F4 shapes: provided a short read sentence, pt identified the shape heard embedded in the sentence.    PN: 3/12/2024  MET-Consider increased accuracy to 90% and/or increased length of paragraph   Pt will " "follow 2 step commands with 4 components, 80% of presentations. PN: 4/11/2024  Improving  2 step directions  2-step:   objects 100%  Adjectives 40%  First/then: 1/3 33%  Adjectives 0/3, 0%  2 adjectives 2/3, 66%  Before/after- in order: 2/3 66%  Before/after- reverse order 2/3 66%  Before/after + Adjectives: 1/3, 33%  Before/after + 2 Adjectives: 1/2, 50%  Before/after +3 Adjectives:; 0/2, 0%    PN: 3/12/2024  Take data   SPOKEN LANG EXP  Provided a descriptive cue and a F6 pictures written words, pt will name the described item, 80% of trials, min A  PN: 4/11/2024  MET   Given simple picture cue pt will produce simple sentences of 3-4 words with simplified grammatical form and approximated content words to convey meaning, Min A, 80% of presentations. 4/23/2024  1 pronoun, simple sentence: 100%  1 noun, present progressive, 100%    4/4/2024  Provided cards with article, noun and verb, pt practiced building and saying 3 words sentences- Min A to build sentence, max A to say sentences.  Matched words to picture (the boy fishes, the hat flies).  Practiced 3 words on Advanced language ted-  (the family plays, the baby crawls) 7/10, 10/10    PN: 3/12/2024  Continue- reduce word bank   READING  Read phrase completion with F4 MC, 80% 4/23/2024  Reading phrases- completion, F4: 70%, 90%    PN: 4/11/2024  Improving 70%   Pt will select picture/word for sentence completion, 80% 4/23/2024  Reading sentences- completion, F2: 80%     PN: 4/11/2024  Improving 60%       Pt will demonstrate reading comprehension of sentences to brief paragraph- answering multiple choice questions, 80% min A 4.23.2024  Reading comprehension of 'What\" questions: Min to mod cues to bring attention to key words:  F3. 8/10 (p119- Source for aphasia) Answered open ended questions:   PN: 411/2024  Improving   Match sentence (F3) to picture 4/4  Continue to brief paragraph.     Reading 35-43 words and answered  F2 questions, 100%     "   Education/Strategy Training 4/11/2024  Reviewed progress this date.     Other 4/23/2024  Spelling: 3-4 letters- unscramble 7/10

## 2024-04-25 NOTE — OP OT TREATMENT LOG
OT NEURO FLOW SHEET    EXERCISES CURRENT SESSION TIME   NEURO RE-ED  CPT 20101 TOTAL TIME FOR SESSION 53-67 Minutes   AAROM/AROM     Strengthening Medium resistance flex bar x 2 sets x 10 repetitions:   -pronation  -radial deviation      Strength     Gross Motor Control     Fine Motor Control Three jaw patti with over top approach to place and move large pegs with moderate assist progressing to minimal assistance for proper grasp as patient attempting to transition to cylindrical grasp.     Engaged in right hand handwriting training with refusal of built up handle. Able to write first name x 3 trials with 80% legibility.    Patient used left hand to write word provided verbally x 22 words. Patient able to spell/write 5 words without visual guide.     Sitting Jennifer/Balance     Standing Jennifer/Balance      Retraining     THERE ACT  CPT 78830 TOTAL TIME FOR SESSION 0-7 Minutes   Pain, Vitals, Meds, Etc. Monitored    HEP     Functional Mobility     Transfers     THERE EX  CPT 52186 TOTAL TIME FOR SESSION Not performed   PROM     Activity Tolerance     SELF-CARE  CPT 72212 TOTAL TIME FOR SESSION Not performed   Pt Education/Safety     ADL Training     IADL Training     MODALITIES  CPT 56058 TOTAL TIME FOR SESSION Not performed   Ice/Heat     ATTENDED E-STIM  CPT 47249 TOTAL TIME FOR SESSION Not performed   Attended E-Stim     SPLINTING  CPT 77047 TOTAL TIME FOR SESSION Not performed   Fabrication/Check Out     Fit     Training     GROUP  CPT 59651 TOTAL TIME FOR SESSION Not performed

## 2024-04-30 ENCOUNTER — HOSPITAL ENCOUNTER (OUTPATIENT)
Dept: OCCUPATIONAL THERAPY | Age: 79
Setting detail: THERAPIES SERIES
Discharge: HOME | End: 2024-04-30
Attending: INTERNAL MEDICINE
Payer: MEDICARE

## 2024-04-30 ENCOUNTER — HOSPITAL ENCOUNTER (OUTPATIENT)
Dept: SPEECH THERAPY | Age: 79
Setting detail: THERAPIES SERIES
Discharge: HOME | End: 2024-04-30
Attending: INTERNAL MEDICINE
Payer: MEDICARE

## 2024-04-30 DIAGNOSIS — I63.9 CEREBROVASCULAR ACCIDENT (CVA), UNSPECIFIED MECHANISM (CMS/HCC): Primary | ICD-10-CM

## 2024-04-30 DIAGNOSIS — I63.512 ACUTE ISCHEMIC LEFT MCA STROKE (CMS/HCC): ICD-10-CM

## 2024-04-30 DIAGNOSIS — I63.9 ISCHEMIC STROKE (CMS/HCC): Primary | ICD-10-CM

## 2024-04-30 DIAGNOSIS — R27.9 LACK OF COORDINATION: ICD-10-CM

## 2024-04-30 PROCEDURE — 97530 THERAPEUTIC ACTIVITIES: CPT | Mod: GO,KX,59

## 2024-04-30 PROCEDURE — 97535 SELF CARE MNGMENT TRAINING: CPT | Mod: GO,KX

## 2024-04-30 PROCEDURE — 97112 NEUROMUSCULAR REEDUCATION: CPT | Mod: GO,59

## 2024-04-30 PROCEDURE — 92507 TX SP LANG VOICE COMM INDIV: CPT | Mod: GN

## 2024-04-30 NOTE — OP SLP TREATMENT LOG
" Today's Treatment:  LANGUAGE SLP FLOW SHEET    SKILL AREA CURRENT SESSION   SPEECH THERAPY: LANGUAGE  CPT 42667   POC: 5/8/2024   MOTOR SPEECH  Pt will produce 1-3 syllable words   with /p,b,m,w,t,d,n,s,l/ in initial position 80% of presentations.  4/30/2024  Repeat single syllable initial /n/:  8/11  Provided picture, Repeat single syllable initial /n/:  7/10  Repeat 2- syllable initial /n/: 2/7 4/25/20214  /n/ 1 syllable : DM: 8/10 (I- Mod A)    PN: 4/11/2024  Improving  /b/  1 syllable: 7/10  2 syllable: 2/2    /p/ direct model  1 syllable: 4/7  2 syllable: 3/4    /m/ direct model  1 syllable: 5/7  2 syllable: 10/13    /t/  auditory bombardment  1 syllable: 5/7  2 syllable:  did not practice.        Pt will repeat familiar greetings and common exchanges provided a direct model, 80% of presentations. 4/25/20214  Dialogue boxes:  Single words: Supervision  Two words: Mod A  3 words: Max A (phrase completions)     PN: 4/11/2024  Improving  Dialogue boxes: single words 15/17- 88% , + one sentence \" I don't want it.\"    4/9/24:  Provided 16 personal questions, F4 written choices, pt able to point to correct answer and repeat some modifications. 15/16.     3/28/2024  Provided 16 personal questions, F4 written choices, pt able to point to correct answer and repeat some modifications. 13/16    3/26/2024   f s d=  Initial position difficulty   Approx   1 syllable: 15/15  2 syllalbe: 5/5    PN: 3/12/2024  Continue   SPOKEN LANG COMP  Pt will demonstrate understanding of complex sentences and short paragraphs with 80% accuracy to y/n questions.    WALC1: p 191-192 etc 4/30/2024  Brief paragraph auditory comprehension: y/n 16/20- 80%    3/26/2024  F4 shapes: provided a short read sentence, pt identified the shape heard embedded in the sentence.    PN: 3/12/2024  MET-Consider increased accuracy to 90% and/or increased length of paragraph   Pt will follow 2 step commands with 4 components, 80% of presentations. PN: " "4/11/2024  Improving  2 step directions  2-step:   objects 100%  Adjectives 40%  First/then: 1/3 33%  Adjectives 0/3, 0%  2 adjectives 2/3, 66%  Before/after- in order: 2/3 66%  Before/after- reverse order 2/3 66%  Before/after + Adjectives: 1/3, 33%  Before/after + 2 Adjectives: 1/2, 50%  Before/after +3 Adjectives:; 0/2, 0%    PN: 3/12/2024  Take data   SPOKEN LANG EXP  Provided a descriptive cue and a F6 pictures written words, pt will name the described item, 80% of trials, min A  PN: 4/11/2024  MET   Given simple picture cue pt will produce simple sentences of 3-4 words with simplified grammatical form and approximated content words to convey meaning, Min A, 80% of presentations. 4/23/2024  1 pronoun, simple sentence: 100%  1 noun, present progressive, 100%    4/4/2024  Provided cards with article, noun and verb, pt practiced building and saying 3 words sentences- Min A to build sentence, max A to say sentences.  Matched words to picture (the boy fishes, the hat flies).  Practiced 3 words on Advanced language ted-  (the family plays, the baby crawls) 7/10, 10/10    PN: 3/12/2024  Continue- reduce word bank   READING  Read phrase completion with F4 MC, 80% 4/30/2024  Reading phrases- completion, F4: 67%  Reading Phrase completion  [facility] 1 p 163 F3: 6/15 , 40%, pt reported concern with this paper task.  Suspect due to 15 on a page.     4/23/2024  Reading phrases- completion, F4: 70%, 90%    PN: 4/11/2024  Improving 70%   Pt will select picture/word for sentence completion, 80% 4/30/2024  Reading Sentence completion, F2:  80%    4/23/2024  Reading sentences- completion, F2: 80%     PN: 4/11/2024  Improving 60%       Pt will demonstrate reading comprehension of sentences to brief paragraph- answering multiple choice questions, 80% min A 4.23.2024  Reading comprehension of 'What\" questions: Min to mod cues to bring attention to key words:  F3. 8/10 (p119- Source for aphasia) Answered open ended questions:   P  N: " 411/2024  Improving   Match sentence (F3) to picture 4/4  Continue to brief paragraph.     Reading 35-43 words and answered  F2 questions, 100%       Education/Strategy Training 4/30/2024  Provided practice sentence fill-ins F3: pt encouraged to attempt with exposure.    Answering single word sentences: Pt encouraged to attempt    Discussed benefit from HHA    4/11/2024  Reviewed progress this date.     Other     Spelling/copying words: 9/10  4/23/2024  Spelling: 3-4 letters- unscramble 7/10

## 2024-04-30 NOTE — PROGRESS NOTES
"Speech-Language Pathology Visit      SLP DAILY NOTE FOR OUTPATIENT THERAPY    Patient: Jennifer Sams   MRN: [id number]  : 1945 79 y.o.  Referring Physician: Ney Aparicio,*  Date of Visit: 2024      Certification Dates: 02/15/24 through 24    Diagnosis:   1. Cerebrovascular accident (CVA), unspecified mechanism (CMS/HCC)    2. Acute ischemic left MCA stroke (CMS/HCC)        Chief Complaints:  difficulty communicating    Precautions:  Precautions Comments: aphasia, apraxia, pacemaker       TODAY'S VISIT:    Session Time:  Start Time: 1606 (received from OT)  Stop Time: 1701  Time Calculation (min): 55 min   History/Vitals/Pain/Encounter Info - 24 1602          Injury History/Precautions/Daily Required Info    Primary Therapist Magnolia Lundy MS, CCC/SLP     Chief Complaint/Reason for Visit  difficulty communicating     Onset of Illness/Injury or Date of Surgery 23     Referring Physician Dr. Aparicio     Precautions Comments aphasia, apraxia, pacemaker     Document Type daily treatment     Patient/Family/Caregiver Comments/Observations Pt reports concern about shoulder pain and is discussing with Thelma BECERRIL to get it checked out.  Has not scheduled appointment at this time. Pt expresses that she does not have others to assist with needs.  Others have  or moved away.  \"Just me.\"     Patient reported fall since last visit No        Pain Assessment    Currently in pain Yes     Pain Side/Orientation right     Pain: Body location Arm     FACES Pain Rating: Activity 8-->hurts whole lot        Pain Interventions    Intervention hurts with elevation.     Post Intervention Comments none                    Daily Treatment Assessment and Plan - 24 1602          Daily Treatment Assessment and Plan    Progress toward goals Progressing     Daily Outcome Summary Improved /n/ initial with direct model.  Reduced production with picture cues paired with direct model bc pt directs " "attention to picture cue and not DM. Pt able to demonstrate over 50% accu with phrase and sentence completion presented 1 at a time on ipad, but when presented phrase completion F4 with 15, pt appears to become overwhelmed and produces less than 50%     Plan and Recommendations /l/ initial position.  /n/ single syllable and 2 syllable practice with direct model.  Continue with phrase fill ins and dialogue boxes.                      OBJECTIVE MEASUREMENTS TAKEN TODAY:    None Taken    Today's Treatment:     Today's Treatment:  LANGUAGE SLP FLOW SHEET    SKILL AREA CURRENT SESSION   SPEECH THERAPY: LANGUAGE  CPT 15308   POC: 5/8/2024   MOTOR SPEECH  Pt will produce 1-3 syllable words   with /p,b,m,w,t,d,n,s,l/ in initial position 80% of presentations.  4/30/2024  Repeat single syllable initial /n/:  8/11  Provided picture, Repeat single syllable initial /n/:  7/10  Repeat 2- syllable initial /n/: 2/7 4/25/20214  /n/ 1 syllable : DM: 8/10 (I- Mod A)    PN: 4/11/2024  Improving  /b/  1 syllable: 7/10  2 syllable: 2/2    /p/ direct model  1 syllable: 4/7  2 syllable: 3/4    /m/ direct model  1 syllable: 5/7  2 syllable: 10/13    /t/  auditory bombardment  1 syllable: 5/7  2 syllable:  did not practice.        Pt will repeat familiar greetings and common exchanges provided a direct model, 80% of presentations. 4/25/20214  Dialogue boxes:  Single words: Supervision  Two words: Mod A  3 words: Max A (phrase completions)     PN: 4/11/2024  Improving  Dialogue boxes: single words 15/17- 88% , + one sentence \" I don't want it.\"    4/9/24:  Provided 16 personal questions, F4 written choices, pt able to point to correct answer and repeat some modifications. 15/16.     3/28/2024  Provided 16 personal questions, F4 written choices, pt able to point to correct answer and repeat some modifications. 13/16    3/26/2024   f s d=  Initial position difficulty   Approx   1 syllable: 15/15  2 syllalbe: 5/5    PN: 3/12/2024  Continue "   SPOKEN LANG COMP  Pt will demonstrate understanding of complex sentences and short paragraphs with 80% accuracy to y/n questions.    WALC1: p 191-192 etc 4/30/2024  Brief paragraph auditory comprehension: y/n 16/20- 80%    3/26/2024  F4 shapes: provided a short read sentence, pt identified the shape heard embedded in the sentence.    PN: 3/12/2024  MET-Consider increased accuracy to 90% and/or increased length of paragraph   Pt will follow 2 step commands with 4 components, 80% of presentations. PN: 4/11/2024  Improving  2 step directions  2-step:   objects 100%  Adjectives 40%  First/then: 1/3 33%  Adjectives 0/3, 0%  2 adjectives 2/3, 66%  Before/after- in order: 2/3 66%  Before/after- reverse order 2/3 66%  Before/after + Adjectives: 1/3, 33%  Before/after + 2 Adjectives: 1/2, 50%  Before/after +3 Adjectives:; 0/2, 0%    PN: 3/12/2024  Take data   SPOKEN LANG EXP  Provided a descriptive cue and a F6 pictures written words, pt will name the described item, 80% of trials, min A  PN: 4/11/2024  MET   Given simple picture cue pt will produce simple sentences of 3-4 words with simplified grammatical form and approximated content words to convey meaning, Min A, 80% of presentations. 4/23/2024  1 pronoun, simple sentence: 100%  1 noun, present progressive, 100%    4/4/2024  Provided cards with article, noun and verb, pt practiced building and saying 3 words sentences- Min A to build sentence, max A to say sentences.  Matched words to picture (the boy fishes, the hat flies).  Practiced 3 words on Advanced language ted-  (the family plays, the baby crawls) 7/10, 10/10    PN: 3/12/2024  Continue- reduce word bank   READING  Read phrase completion with F4 MC, 80% 4/30/2024  Reading phrases- completion, F4: 67%  Reading Phrase completion  WAL 1 p 163 F3: 6/15 , 40%, pt reported concern with this paper task.  Suspect due to 15 on a page.     4/23/2024  Reading phrases- completion, F4: 70%, 90%    PN: 4/11/2024  Improving  "70%   Pt will select picture/word for sentence completion, 80% 4/30/2024  Reading Sentence completion, F2:  80%    4/23/2024  Reading sentences- completion, F2: 80%     PN: 4/11/2024  Improving 60%       Pt will demonstrate reading comprehension of sentences to brief paragraph- answering multiple choice questions, 80% min A 4.23.2024  Reading comprehension of 'What\" questions: Min to mod cues to bring attention to key words:  F3. 8/10 (p119- Source for aphasia) Answered open ended questions:   P  N: 411/2024  Improving   Match sentence (F3) to picture 4/4  Continue to brief paragraph.     Reading 35-43 words and answered  F2 questions, 100%       Education/Strategy Training 4/30/2024  Provided practice sentence fill-ins F3: pt encouraged to attempt with exposure.    Answering single word sentences: Pt encouraged to attempt    Discussed benefit from HHA    4/11/2024  Reviewed progress this date.     Other     Spelling/copying words: 9/10  4/23/2024  Spelling: 3-4 letters- unscramble 7/10                               "

## 2024-05-01 ENCOUNTER — DOCUMENTATION (OUTPATIENT)
Dept: OCCUPATIONAL THERAPY | Age: 79
End: 2024-05-01
Payer: MEDICARE

## 2024-05-01 NOTE — PROGRESS NOTES
Occupational Therapy Progress Note     [facility] Fax: [phone]     OT RE-EVALUATION FOR OUTPATIENT THERAPY     Patient: Jennifer Sams         MRN: [id number]  : 1945 79 y.o.                        Date of Visit: 2024     New Certification Dates: 24 through 24     Recommended Frequency & Duration:  2 times/week for up to 4 weeks       Diagnosis:   1. Acute ischemic left MCA stroke (CMS/HCC)    2. Lack of coordination          Chief Complaints:  No chief complaint on file.        Precautions:      TODAY'S VISIT:     Time In Session:  Start Time: 161 (Patient arrived late to scheduled appt)  Stop Time: 1700  Time Calculation (min): 45 min         General Information - 24 161                 Session Details     Document Type re-evaluation            General Information     Onset of Illness/Injury or Date of Surgery 23      Referring Physician       History of present illness/functional impairment The patient is a 78-year-old  female with a history of AAA, heart block s/p cardiac pacemaker, glaucoma, hyperlipidemia, coronary artery disease, NSTEMI, who presented to [facility] on  after she has not been seen by her friends for a few days.  Patient lives alone and when she was found by EMS she was confused and combative.  In the emergency department she was aphasic with right-sided weakness and hypertensive to the 190 systolic.  CT scan of the head revealed an acute infarct in left MCA with mild bleeding.  Echo showed an EF of 43% and a bubble study was negative.  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  Later this was transitioned to apixaban.  She was continued on statin for secondary prevention.  She required virtual shivani while in the hospital but this was not effective.  She was evaluated by speech therapy and cleared for a soft and bite-size diet with  moderately thick liquids.'      Patient/Family/Caregiver Comments/Observations Patient with caregiver Thelma who reports that she has been dealing with sickness this week and lethargic             Services     Do You Speak a Language Other Than English at Home? no                         Pain/Vitals - 02/06/24 1617                 Pain Assessment     Currently in pain No/Denies                         OT - 02/06/24 1801                 Occupational Therapy Encounter Type Details     Occupational Therapy Specialty Traditional Neuro Program OT            OT Frequency and Duration     Frequency of treatment 2 times/week      OT Duration 4 weeks      OT Cert From 02/06/24      OT Cert To 03/05/24      Date OT POC was sent to provider 02/06/24      Signed OT Plan of Care received?  Yes                         Assessment - 02/07/24 1520                 Assessment     Plan of Care reviewed and patient/family in agreement Yes      System Pathology/Pathophysiology Noted neuromuscular      Functional Limitations in Following Categories self-care;home management;community/leisure      Problem List: Occupational Therapy coordination impaired;impaired cognition;sensation decreased      Rehab Potential/Prognosis: Occupational Therapy good, to achieve stated therapy goals      Clinical Assessment Patient was seen in OP OT for improved functional use of RUE s/p CVA and re-evaluation was conducted today. Patient was seen for initial OT evaluation on 8/16/2023 and last re-evaluated on 11/14/23, she has participated in 32 Occupational Therapy sessions, including this visit. At initial evaluation she presented with deficits related to right hand range of motion, fine motor coordination at RUE, right hand dexterity, right hand  and pinch strength and performance with ADLs and IADLs. Patient arrives accompanied by caregiver Thelma today who reports that patient has been dealing with illness this week which has impacted her  energy; testing today likely impacted by this setback in energy. The following objective measures were administered today: Patient able to grasp and place 3 pegs in pegboard during allotted 3 minutes of 9 hole pegboard (baseline: 3 pegs) indicating continued limitation in fine motor control at right hand, Box and Block test score is decreased to 23 blocks (re-eval: 28),  strength at right hand is 5.5 lbs average (re-eval: 6 lbs). Subjectively, Thelma reports several instances of increased automatic use of RUE including with wiping face or holding light items. Thelma reports patient has improved performance of ADLs and IADLs and now more consistant with tying shoes, washing her hair, and cleaning dishes. Continued functional limitations include meal preparation due to difficulty with bilateral coordination and cutting food. Results of today's re-evaluation likely impacted by low energy level due to recent illness and not indicative of patient progress, which has been observed subjectively by this writer and caregiver Thelma. Significant functional limitations remain and patient can benefit from extension of current POC, 2x/ week for 4 weeks to address aforementioned functional deficits and transition focus of OT to sustainable HEP for continued recovery following discharge.      Plan and Recommendations task-oriented practice, fine and gross motor, functional tasks with right hand      Planned Services CPT 73575 Neuromuscular Reeducation;CPT 40382 Therapeutic activities;CPT 70911 Self-care/Home management training;CPT 51250 Hot/Cold Packs therapy                     OBJECTIVE MEASUREMENTS/DATA:     BADL/IADL          BADL/IADL - 02/06/24 1621                 BADL Interventions Assessment     Upper Body Dressing Modified independence      Lower Body Dressing Modified independence      Lower body dressing comments Some improvement with tying shoe laces      Bathing Modified independence      Bathing comments Using shower  "chair and grab bars, reliant on use of LUE. Now washing her hair 70% of time.      Toileting Independent      Grooming Modified independence      Grooming comments Usually automatic toothbrush. Able to brush hair but difficulty with blow drying hair      Eating Modified independence      Eating comments Reliant on LUE            IADL/Home Interventions Assessment     Care of Others Independent      Care of Others comments Using LUE to clean litterbox, feed and water. Challenged by opening wet food.      Home management/maintenance Independent      Home management/maintenance comments Laundry, light cleaning, washing dishes, organizing/straightening up      Meal prep / clean up Maximum assist (25% patient effort)      Meal prep / clean up comments Not able prepare meals      Other (comments) Home health aid comes 2 times per week and performs light housekeeping, reminder about medication, and makes some meals                  Work and School           Work and School Assessment - 02/06/24 1621                 Work/School/Leisure Assessment     Job Performance (comments) Retired      Leisure / Social Participation (comments) Patient reports that she is \"bored\"                  Gross and Fine Motor           Gross and Fine Motor - 02/06/24 1621                 Hand  Strength Testing     Right Hand, Setting 2 6/5; Average: 5.5 lbs   Signs of discomfort demonstrated with testing today                 Outcome Measures          Outcome Measures - 02/06/24 1621                 Objective Outcome Measures     RIGHT hand: Box and Blocks Assessment 23      9 Hole Peg Test - RIGHT HAND TIME 180   Removal only 32.46     9 Hole Peg Test - COMMENTS Patient placed 3 pegs into board today in 3 minutes                             ROM and MMT         9/14/2023    18:07 11/28/2023    16:29 1/4/2024    19:06 1/11/2024    19:18 1/18/2024    17:11   OT Cervical/Lumbar/Other ROM Measurements   Other: Girth Measurement/Comments     Right " IF: P1-5.9 cm, PIP- 6cm, P2- 5.3 cm, DIP- 5.1 cm // Left IF: P1-5.6 cm, PIP-5.5 cm, P2- 4.9 cm, DIP-4.9 cm // Right MCP [place]- 17.5 cm Right IF: P1-5.8 cm, PIP- 5.9cm, P2- 5.1 cm, DIP- 4.9 // Right MCP [place]- 17.5 cm Right IF: P1-5.9 cm, PIP- 5.9cm, P2- 5.2 cm, DIP- 4.9 // Right MCP [place]- 17.5 cm          .5 cm MF to DPC   OT UE MMT   Right Shoulder Flexion (4+/5) good plus           Left Shoulder Flexion (4+/5) good plus (4+/5) good plus         Right Shoulder Extension (4+/5) good plus           Left Shoulder Extension (4+/5) good plus (4+/5) good plus         Right Shoulder ADD (4+/5) good plus           Left Shoulder ADD (4+/5) good plus (4+/5) good plus         Right Shoulder ABD (4+/5) good plus           Left Shoulder ABD (4+/5) good plus (4+/5) good plus         Right Shoulder IR (3+/5) fair plus           Left Shoulder IR (4+/5) good plus (4+/5) good plus         Right Shoulder ER (4/5) good           Left Shoulder ER (4+/5) good plus (4+/5) good plus         Right Elbow Flexion (4+/5) good plus           Left Elbow Flexion (4+/5) good plus (4+/5) good plus         Right Elbow Extension (4+/5) good plus           Left Elbow Extension (4+/5) good plus (4+/5) good plus         Right Forearm Supination (4/5) good           Left Forearm Supination (4+/5) good plus (4+/5) good plus         Right Forearm Pronation (4/5) good           Left Forearm Pronation (4+/5) good plus (4+/5) good plus         Right Wrist Flexion (3+/5) fair plus           Left Wrist Flexion (4/5) good (4/5) good         Right Wrist Extension (3+/5) fair plus           Left Wrist Extension (4/5) good (4/5) good         Right Wrist UD (2/5) poor           Left Wrist UD (4/5) good (4/5) good         Right Wrist RD (2/5) poor           Left Wrist RD (4/5) good (4/5) good                     Outcome Measures         10/26/2023    14:01 11/2/2023    13:20 11/14/2023    14:20 12/14/2023    17:12 1/18/2024    17:11 2/6/2024    16:21   OT  OBJECTIVE Outcome Measures   9 Hole Peg Test - Right Hand 48.6       Removing pegs only 30.88       peg removal only 16.65       Removing only using lateral pinch 180       3 pegs placed 180       14.82- removal only 180       Removal only 32.46   9 Hole Peg Test - Left Hand     24.06   25.84       removing only     9 Hole Peg Test - Comments         Patient able to place 5 pegs in pegboard in 3 minutes at right hand Patient placed 3 pegs into board today in 3 minutes   Right Hand Box and Blocks     53 24 23 23   Left Hand Box and Blocks     28         Other Outcome Measure     Right MF to DPC = 1.5 cm               Today's Treatment:        OT NEURO FLOW SHEET     EXERCISES  Initial Evaluation  Progress Note 1  Progress Note 2  Re-evaluation  Progress note 3  Progress note 4  Re-evaluation CURRENT SESSION  8/16/2023  9/14/2023  10/23/23  11/14/23  12/14/24  1/18/24  2/6/24 TIME        45 min   NEURO RE-ED  CPT 88821 TOTAL TIME FOR SESSION 8-22 Minutes   AAROM/AROM       Strengthening        Strength    Re-assessed for re-evaluation     Gross Motor Control Re-assessed for re-evaluation     Fine Motor Control Re-assessed for re-evaluation     Sitting Jennifer/Balance       Standing Jennifer/Balance       Sensory re-education       Graded motor imagery        Retraining       THERE ACT  CPT 73725'  TOTAL TIME FOR SESSION 23-37 Minutes   Pain, Vitals, Meds, Etc. Vitals taken, pain assessed/monitored        Assessment Patient was 100% full participant in OT re-evaluation. Metrics taken today included 9 hole peg test, box and block test, and  strength.     HEP  12/15/23: Added towel grasp and fold exercise, right hand only; patient expressed good understanding.     11/2/23: Review pinch strengthening positions today for improved carry over     10/26/23: Added soft theraputty for right hand pinch (lateral pinch, tip pinch, 3 jaw patti); patient expressed good understnading     Discussed carry over with use of SF splint      9/28/23: Laterality exercise added to HEP; patient and caregiver expressed good understanding     Right hand stretching     9/21/2023:   -Educated on use of rice/bean sensory bin, massage, sensory re-education with deep pressure and textures. Issued tubigrip for RUE sensory re-education and advised to wear 1x per day. Continued to discuss edema management.      Functional Mobility       Transfers       THERE EX  CPT 66095 TOTAL TIME FOR SESSION Not performed   PROM       Activity Tolerance       SELF-CARE  CPT 47931 TOTAL TIME FOR SESSION Not performed   Pt Education/Safety       ADL Training       IADL Training       MODALITIES  CPT 00275 TOTAL TIME FOR SESSION Not performed   Ice/Heat       ATTENDED E-STIM  CPT 17352 TOTAL TIME FOR SESSION Not performed   Attended E-Stim       SPLINTING  CPT 52915 TOTAL TIME FOR SESSION Not performed   Fabrication/Check Out       Fit       Training       GROUP  CPT 50355 TOTAL TIME FOR SESSION Not performed                             Normative Range for Female 75+ Years of Age           Right Hand     Left Hand         Initial Evaluation Progress Note 1 Norm Initial Evaluation Progress Note 1 Norm    Strength 0 lbs 0 lbs 42.6 lbs 30 lbs 30 lbs 37.6 lbs   Lateral Pinch N/T 2 lbs 12.6 lbs N/T 14 lbs 11.4 lbs   Three Jaw Yonatan N/T 2 lbs (assist for position) 12 lbs N/T 11 lbs 11.5 lbs   Tip Pinch N/T N/T 9.6 lbs N/T 9 lbs 9.3 lbs   Box and Block 4 blocks 22 blocks 65 blocks 50 blocks 56 blocks 63.6 blocks   Nine Hole Peg (71+ age) Unable to complete Unable to complete 22.49 seconds N/T 24.55 seconds 24.11 seconds         Goals:                  Goals                 OT Neuro/Deconditioning Goals             Short Term Goals Time Frame Result Comment/Progress   Assess gross motor control via Box and Block Assessment  4 weeks Met 9/14: R: 22; L: 56   Assess fine motor control via 9 hole peg test 4 weeks Met  9/14: R: Unable; L: 24.55   Improved automatic fine motor use at right  hand with routine tasks (e.g. eating, writing, brushing teeth) to 25% of baseline  4 weeks Ongoing     Explore adaptive visual strategies to encourage looking to right side 4 weeks Met     Incorporate right hand in at least 2 self-care activities 4 weeks Met     Carry over HEP at least 3 times per week  4 weeks Met     Score at least 32 on Box and block test 4 weeks Ongoing 2/7/24: 23 blocks   Increase right hand  strength to 8 lbs 4 weeks Met        Long Term Goals Time Frame Result Comment/Progress   Improve gross motor control as evidenced by at least 10 block improved at RUE with Box and Block Assessment for improved ability to perform ADLs and IADLs  12 weeks Met     Patient will complete 9 hole peg test in under 3 minutes 12 weeks Ongoing     Decrease distance from Right MF to DPC to 2 cm for improved functional grasp at right hand 12 weeks Met     Improved automatic fine motor use at right hand with routine tasks (e.g. eating, writing, brushing teeth) to 75% of baseline 12 weeks Ongoing     Increase right hand  strength to at least 15 lbs 12 weeks Ongoing     Increase Box and Block score to at least 40 12 weeks Ongoing 2/7/24: 23 blocks                          This 79 y.o. year old female presents to OT with above stated diagnosis. Occupational Therapy evaluation reveals coordination impaired, impaired cognition, sensation decreased resulting in self-care, home management, community/leisure limitations. Jennifer Sams will benefit from skilled OT services to address limitation, work towards rehab and patient goals and maximize PLOF of chosen ADLs.     Planned Services: The patient’s treatment will include CPT 86364 Neuromuscular Reeducation, CPT 95246 Therapeutic activities, CPT 46693 Self-care/Home management training, CPT 33419 Hot/Cold Packs therapy, .     Barry Rosas OT

## 2024-05-01 NOTE — PROGRESS NOTES
Duplicate note for purposes of resending OT POC.    Occupational Therapy Progress Note    [facility] Fax: [phone]    OT RE-EVALUATION FOR OUTPATIENT THERAPY    Patient: Jennifer Sams   MRN: [id number]  : 1945 79 y.o.    Date of Visit: 2024    New Certification Dates: 24 through 24    Recommended Frequency & Duration:  2 times/week for up to 4 weeks         Diagnosis:   1. Acute ischemic left MCA stroke (CMS/HCC)    2. Lack of coordination        Chief Complaints:  No chief complaint on file.      Precautions:        TODAY'S VISIT:    Time In Session:  Start Time: 1615 (Patient arrived late to scheduled appt)  Stop Time: 1700  Time Calculation (min): 45 min   General Information - 24 1617          Session Details    Document Type re-evaluation        General Information    Onset of Illness/Injury or Date of Surgery 23     Referring Physician Dr. Haywood     History of present illness/functional impairment The patient is a 78-year-old  female with a history of AAA, heart block s/p cardiac pacemaker, glaucoma, hyperlipidemia, coronary artery disease, NSTEMI, who presented to [facility] on  after she has not been seen by her friends for a few days.  Patient lives alone and when she was found by EMS she was confused and combative.  In the emergency department she was aphasic with right-sided weakness and hypertensive to the 190 systolic.  CT scan of the head revealed an acute infarct in left MCA with mild bleeding.  Echo showed an EF of 43% and a bubble study was negative.  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  Later this was transitioned to apixaban.  She was continued on statin for secondary prevention.  She required virtual shivani while in the hospital but this was not effective.  She was evaluated by speech therapy and cleared for a soft and bite-size diet  with moderately thick liquids.'     Patient/Family/Caregiver Comments/Observations Patient with caregiver Thelma who reports that she has been dealing with sickness this week and lethargic         Services    Do You Speak a Language Other Than English at Home? no                      Pain/Vitals - 02/06/24 1617          Pain Assessment    Currently in pain No/Denies                    OT - 02/06/24 1801          Occupational Therapy Encounter Type Details    Occupational Therapy Specialty Traditional Neuro Program OT        OT Frequency and Duration    Frequency of treatment 2 times/week     OT Duration 4 weeks     OT Cert From 02/06/24     OT Cert To 03/05/24     Date OT POC was sent to provider 02/06/24     Signed OT Plan of Care received?  Yes                    Assessment - 02/07/24 1520          Assessment    Plan of Care reviewed and patient/family in agreement Yes     System Pathology/Pathophysiology Noted neuromuscular     Functional Limitations in Following Categories self-care;home management;community/leisure     Problem List: Occupational Therapy coordination impaired;impaired cognition;sensation decreased     Rehab Potential/Prognosis: Occupational Therapy good, to achieve stated therapy goals     Clinical Assessment Patient was seen in OP OT for improved functional use of RUE s/p CVA and re-evaluation was conducted today. Patient was seen for initial OT evaluation on 8/16/2023 and last re-evaluated on 11/14/23, she has participated in 32 Occupational Therapy sessions, including this visit. At initial evaluation she presented with deficits related to right hand range of motion, fine motor coordination at RUE, right hand dexterity, right hand  and pinch strength and performance with ADLs and IADLs. Patient arrives accompanied by caregiver Thelma today who reports that patient has been dealing with illness this week which has impacted her energy; testing today likely impacted by this setback in  energy. The following objective measures were administered today: Patient able to grasp and place 3 pegs in pegboard during allotted 3 minutes of 9 hole pegboard (baseline: 3 pegs) indicating continued limitation in fine motor control at right hand, Box and Block test score is decreased to 23 blocks (re-eval: 28),  strength at right hand is 5.5 lbs average (re-eval: 6 lbs). Subjectively, Thelma reports several instances of increased automatic use of RUE including with wiping face or holding light items. Thelma reports patient has improved performance of ADLs and IADLs and now more consistant with tying shoes, washing her hair, and cleaning dishes. Continued functional limitations include meal preparation due to difficulty with bilateral coordination and cutting food. Results of today's re-evaluation likely impacted by low energy level due to recent illness and not indicative of patient progress, which has been observed subjectively by this writer and caregiver Thelma. Significant functional limitations remain and patient can benefit from extension of current POC, 2x/ week for 4 weeks to address aforementioned functional deficits and transition focus of OT to sustainable HEP for continued recovery following discharge.     Plan and Recommendations task-oriented practice, fine and gross motor, functional tasks with right hand     Planned Services CPT 29186 Neuromuscular Reeducation;CPT 54986 Therapeutic activities;CPT 14386 Self-care/Home management training;CPT 52893 Hot/Cold Packs therapy                     OBJECTIVE MEASUREMENTS/DATA:    BADL/IADL    BADL/IADL - 02/06/24 1621          BADL Interventions Assessment    Upper Body Dressing Modified independence     Lower Body Dressing Modified independence     Lower body dressing comments Some improvement with tying shoe laces     Bathing Modified independence     Bathing comments Using shower chair and grab bars, reliant on use of LUE. Now washing her hair 70% of time.  "    Toileting Independent     Grooming Modified independence     Grooming comments Usually automatic toothbrush. Able to brush hair but difficulty with blow drying hair     Eating Modified independence     Eating comments Reliant on LUE        IADL/Home Interventions Assessment    Care of Others Independent     Care of Others comments Using LUE to clean litterbox, feed and water. Challenged by opening wet food.     Home management/maintenance Independent     Home management/maintenance comments Laundry, light cleaning, washing dishes, organizing/straightening up     Meal prep / clean up Maximum assist (25% patient effort)     Meal prep / clean up comments Not able prepare meals     Other (comments) Home health aid comes 2 times per week and performs light housekeeping, reminder about medication, and makes some meals                   Work and School     Work and School Assessment - 02/06/24 1621          Work/School/Leisure Assessment    Job Performance (comments) Retired     Leisure / Social Participation (comments) Patient reports that she is \"bored\"                   Gross and Fine Motor     Gross and Fine Motor - 02/06/24 1621          Hand  Strength Testing    Right Hand, Setting 2 6/5; Average: 5.5 lbs   Signs of discomfort demonstrated with testing today                  Outcome Measures    Outcome Measures - 02/06/24 1621          Objective Outcome Measures    RIGHT hand: Box and Blocks Assessment 23     9 Hole Peg Test - RIGHT HAND TIME 180   Removal only 32.46    9 Hole Peg Test - COMMENTS Patient placed 3 pegs into board today in 3 minutes                     ROM and MMT          11/28/2023    16:29 1/4/2024    19:06 1/11/2024    19:18 1/18/2024    17:11 3/26/2024    14:00 4/23/2024    17:00   OT UE ROM Measurements   AROM: Right Shoulder Flexion     150 degrees 135 degrees   AROM: Right Shoulder ABD     135 degrees       unable to move through full sagittal pain 91 degrees       limited by pain "   AROM: Right Shoulder IR     20 degrees       hard end feel and pain --        WFL   AROM: Right Shoulder ER     --        WFL    AROM: Right Elbow Flex/Ext     WFL    AROM: Right Wrist Flexion     60 degrees    AROM: Right Wrist Extension     55 degrees    AROM: Right Wrist UD     50 degrees    AROM: Right Wrist RD     20 degrees    OT Cervical/Lumbar/Other ROM Measurements   Other: Girth Measurement/Comments  Right IF: P1-5.9 cm, PIP- 6cm, P2- 5.3 cm, DIP- 5.1 cm // Left IF: P1-5.6 cm, PIP-5.5 cm, P2- 4.9 cm, DIP-4.9 cm // Right MCP Lummi- 17.5 cm Right IF: P1-5.8 cm, PIP- 5.9cm, P2- 5.1 cm, DIP- 4.9 // Right MCP Lummi- 17.5 cm Right IF: P1-5.9 cm, PIP- 5.9cm, P2- 5.2 cm, DIP- 4.9 // Right MCP Lummi- 17.5 cm          .5 cm MF to DPC     OT UE MMT   Left Shoulder Flexion (4+/5) good plus        Left Shoulder Extension (4+/5) good plus        Left Shoulder ADD (4+/5) good plus        Left Shoulder ABD (4+/5) good plus        Left Shoulder IR (4+/5) good plus        Left Shoulder ER (4+/5) good plus        Left Elbow Flexion (4+/5) good plus        Left Elbow Extension (4+/5) good plus        Left Forearm Supination (4+/5) good plus        Left Forearm Pronation (4+/5) good plus        Left Wrist Flexion (4/5) good        Left Wrist Extension (4/5) good        Left Wrist UD (4/5) good        Left Wrist RD (4/5) good          Outcome Measures          12/14/2023    17:12 1/18/2024    17:11 2/6/2024    16:21 3/26/2024    14:10 4/23/2024    17:12 4/30/2024    21:59   OT OBJECTIVE Outcome Measures   9 Hole Peg Test - Right Hand 180       3 pegs placed 180       14.82- removal only 180       Removal only 32.46   --        Unable to complete   9 Hole Peg Test - Left Hand  25.84       removing only       9 Hole Peg Test - Comments  Patient able to place 5 pegs in pegboard in 3 minutes at right hand Patient placed 3 pegs into board today in 3 minutes      Right Hand Box and Blocks 24 23 23 15 21    Left Hand Box and  Blocks    48         Today's Treatment:      OT NEURO FLOW SHEET    EXERCISES  Initial Evaluation  Progress Note 1  Progress Note 2  Re-evaluation  Progress note 3  Progress note 4  Re-evaluation CURRENT SESSION  8/16/2023  9/14/2023  10/23/23  11/14/23  12/14/24  1/18/24  2/6/24 TIME      45 min   NEURO RE-ED  CPT 04378 TOTAL TIME FOR SESSION 8-22 Minutes   AAROM/AROM     Strengthening      Strength   Re-assessed for re-evaluation    Gross Motor Control Re-assessed for re-evaluation    Fine Motor Control Re-assessed for re-evaluation    Sitting Jennifer/Balance     Standing Jennifer/Balance     Sensory re-education     Graded motor imagery      Retraining     THERE ACT  CPT 12955'  TOTAL TIME FOR SESSION 23-37 Minutes   Pain, Vitals, Meds, Etc. Vitals taken, pain assessed/monitored      Assessment Patient was 100% full participant in OT re-evaluation. Metrics taken today included 9 hole peg test, box and block test, and  strength.    HEP  12/15/23: Added towel grasp and fold exercise, right hand only; patient expressed good understanding.    11/2/23: Review pinch strengthening positions today for improved carry over    10/26/23: Added soft theraputty for right hand pinch (lateral pinch, tip pinch, 3 jaw patti); patient expressed good understnading    Discussed carry over with use of SF splint    9/28/23: Laterality exercise added to HEP; patient and caregiver expressed good understanding    Right hand stretching    9/21/2023:   -Educated on use of rice/bean sensory bin, massage, sensory re-education with deep pressure and textures. Issued tubigrip for RUE sensory re-education and advised to wear 1x per day. Continued to discuss edema management.     Functional Mobility     Transfers     THERE EX  CPT 80174 TOTAL TIME FOR SESSION Not performed   PROM     Activity Tolerance     SELF-CARE  CPT 21139 TOTAL TIME FOR SESSION Not performed   Pt Education/Safety     ADL Training     IADL Training     MODALITIES  CPT 91007  TOTAL TIME FOR SESSION Not performed   Ice/Heat     ATTENDED E-STIM  CPT 54284 TOTAL TIME FOR SESSION Not performed   Attended E-Stim     SPLINTING  CPT 87869 TOTAL TIME FOR SESSION Not performed   Fabrication/Check Out     Fit     Training     GROUP  CPT 73286 TOTAL TIME FOR SESSION Not performed             Normative Range for Female 75+ Years of Age       Right Hand   Left Hand      Initial Evaluation Progress Note 1 Norm Initial Evaluation Progress Note 1 Norm    Strength 0 lbs 0 lbs 42.6 lbs 30 lbs 30 lbs 37.6 lbs   Lateral Pinch N/T 2 lbs 12.6 lbs N/T 14 lbs 11.4 lbs   Three Jaw Yonatan N/T 2 lbs (assist for position) 12 lbs N/T 11 lbs 11.5 lbs   Tip Pinch N/T N/T 9.6 lbs N/T 9 lbs 9.3 lbs   Box and Block 4 blocks 22 blocks 65 blocks 50 blocks 56 blocks 63.6 blocks   Nine Hole Peg (71+ age) Unable to complete Unable to complete 22.49 seconds N/T 24.55 seconds 24.11 seconds       Goals:     Goals        OT Neuro/Deconditioning Goals        Short Term Goals Time Frame Result Comment/Progress   Assess gross motor control via Box and Block Assessment  4 weeks Met 9/14: R: 22; L: 56   Assess fine motor control via 9 hole peg test 4 weeks Met  9/14: R: Unable; L: 24.55   Improved automatic fine motor use at right hand with routine tasks (e.g. eating, writing, brushing teeth) to 25% of baseline  4 weeks Ongoing     Explore adaptive visual strategies to encourage looking to right side 4 weeks Met    Incorporate right hand in at least 2 self-care activities 4 weeks Met    Carry over HEP at least 3 times per week  4 weeks Met     Score at least 32 on Box and block test 4 weeks Ongoing 2/7/24: 23 blocks  3/26/24: 15 blocks  4/23/24: 21 blocks   Increase right hand  strength to 8 lbs 4 weeks Met    Engage in bilateral fine motor coordination task to open food or self-care containers with supervision during 4/6 trials for increased independence with meal preparation.  4 weeks New goal    Complete at least 4  activities from RUE modified constraint induced movement therapy activity log across 3 weeks with assistance as needed. 4 weeks Met    Engage in simulated or real life food cutting activity using bilateral hands with no more than minimal assistance x 2 trials.  4 weeks Met    Perform RUE scapular internal rotation through at least 45 degrees with no more than 1 point increase in pain in order to promote joint mobilization and reduce scapular adhesion.  4 weeks New goal RE 2/26/24: 20 degrees, hard end feel and pain   Complete right hand three jaw patti or tip pinch activity to retrieve 1x1 inch block and place in target area with short opponens splint donned or manual inhibition of excessive opposition x 10 repetitions across 2 trials for improved coordination 4 weeks New goal       Long Term Goals Time Frame Result Comment/Progress   Improve gross motor control as evidenced by at least 10 block improved at RUE with Box and Block Assessment for improved ability to perform ADLs and IADLs  12 weeks Met     Patient will complete 9 hole peg test in under 3 minutes 12 weeks Ongoing    Decrease distance from Right MF to DPC to 2 cm for improved functional grasp at right hand 12 weeks Met     Improved automatic fine motor use at right hand with routine tasks (e.g. eating, writing, brushing teeth) to 75% of baseline 12 weeks Ongoing     Increase right hand  strength to at least 15 lbs 12 weeks Ongoing 3/26/24: 6 lbs  4/23/24: 11 lbs   Increase Box and Block score to at least 40 12 weeks Ongoing 2/7/24: 23 blocks  3/26/24: 15 blocks                      This 79 y.o. year old female presents to OT with above stated diagnosis. Occupational Therapy evaluation reveals coordination impaired, impaired cognition, sensation decreased resulting in self-care, home management, community/leisure limitations. Jennifer EUGENE Latrell will benefit from skilled OT services to address limitation, work towards rehab and patient goals and  maximize PLOF of chosen ADLs.    Planned Services: The patient’s treatment will include CPT 93645 Neuromuscular Reeducation, CPT 30570 Therapeutic activities, CPT 05451 Self-care/Home management training, CPT 60624 Hot/Cold Packs therapy, .    Barry Rosas, OT

## 2024-05-01 NOTE — ADDENDUM NOTE
Encounter addended by: Barry Rosas OT on: 5/1/2024 3:13 PM   Actions taken: Clinical Note Signed, Letter saved

## 2024-05-01 NOTE — PROGRESS NOTES
Please review and sign this historic OT POC. Thank you.      Occupational Therapy Progress Note    [facility] Fax: [phone]    OT RE-EVALUATION FOR OUTPATIENT THERAPY    Patient: Jennifer Sams   MRN: [id number]  : 1945 79 y.o.    Date of Visit: 2023    New Certification Dates: 23 through 24    Recommended Frequency & Duration:  2 times/week for up to 3 months         Diagnosis:   1. Lack of coordination    2. Acute ischemic left MCA stroke (CMS/HCC)        Chief Complaints:  No chief complaint on file.      Precautions:        TODAY'S VISIT:    Time In Session:  Start Time: 1405  Stop Time: 1500  Time Calculation (min): 55 min   General Information - 23 1419          Session Details    Document Type daily treatment        General Information    Onset of Illness/Injury or Date of Surgery 23     Referring Physician Dr. Haywood     History of present illness/functional impairment The patient is a 78-year-old  female with a history of AAA, heart block s/p cardiac pacemaker, glaucoma, hyperlipidemia, coronary artery disease, NSTEMI, who presented to [facility] on  after she has not been seen by her friends for a few days.  Patient lives alone and when she was found by EMS she was confused and combative.  In the emergency department she was aphasic with right-sided weakness and hypertensive to the 190 systolic.  CT scan of the head revealed an acute infarct in left MCA with mild bleeding.  Echo showed an EF of 43% and a bubble study was negative.  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  Later this was transitioned to apixaban.  She was continued on statin for secondary prevention.  She required virtual shivani while in the hospital but this was not effective.  She was evaluated by speech therapy and cleared for a soft and bite-size diet with moderately thick  liquids.'     Patient/Family/Caregiver Comments/Observations Patient presents to OT session and is agreeable to re-eval         Services    Do You Speak a Language Other Than English at Home? no                      Pain/Vitals - 11/14/23 1419          Pain Assessment    Currently in pain No/Denies                    OT - 11/14/23 1419          Occupational Therapy Encounter Type Details    Occupational Therapy Specialty Traditional Neuro Program OT        OT Frequency and Duration    Frequency of treatment 2 times/week     OT Duration 3 months     OT Cert From 11/14/23     OT Cert To 02/12/24     Date OT POC was sent to provider 11/14/23     Signed OT Plan of Care received?  Yes                    Assessment - 11/14/23 1419          Assessment    Plan of Care reviewed and patient/family in agreement Yes     Comments Patient is in agreeement     System Pathology/Pathophysiology Noted neuromuscular     Functional Limitations in Following Categories self-care;home management;community/leisure     Problem List: Occupational Therapy coordination impaired;impaired cognition;decreased flexibility;sensation decreased     Rehab Potential/Prognosis: Occupational Therapy good, to achieve stated therapy goals     Clinical Assessment Patient seen in OP OT for re-evaluation and arrives unaccompanied today. Patient was seen for initial evaluation on 8/16/23 and presented with significant impairments in functional use of RUE including deficits in sensation, strength, fine and gross motor control. Patient has been seen for 17 OT visits including today. Objective metrics assessed today as follow: Box and Block score at left hand is 53 blocks today (IE: 30 blocks, norms: 63.6 blocks) and at right hand is 28 blocks (IE: 4, norms: 65); 9 hole peg test score at 24.06 sec (norms: 24.11 sec); right hand unable to complete test and modified to only include removing pegs, with improvement to 16.65 seconds todays as compared to  48.6 seconds at first measurement. AROM at right hand has improved per distance from MF to DPC, measured at 1.5 cm today as compared to 6.5 cm at IE. Right hand  strength has improved to 5.3 lbs as compared to 0 lbs at IE, and left hand  strength has improved to 36 lbs as compared to 30 lbs at IE. Subjectively patient reports improvement in independence at home with ADLs and with increase in bilateral coordination for performing routine task including tying her shoes, drying off after showering, and performing dressing. Patient continues to experience neuromuscular deficits at RUE impairing performance with ADL, IADL and leisure occupational performance at this time. Recommend exension of OT POC 2x/week for 3 months and patient is agreeable to plan.     Plan and Recommendations Extend OT POC for improved RUE ROM, strength, coordination, and functional engagement in ADL/IADL/leisure     Planned Services CPT 64766 Neuromuscular Reeducation;CPT 02039 Self-care/Home management training;CPT 48872 Therapeutic activities;CPT 82069 Hot/Cold Packs therapy                     OBJECTIVE MEASUREMENTS/DATA:    BADL/IADL    BADL/IADL - 11/14/23 1420          BADL Interventions Assessment    Upper Body Dressing Modified independence     Lower Body Dressing Modified independence     Lower body dressing comments Intermittent success with tying shoes; taking 10 minutes to get dressing     Bathing Minimum assist (75% patient effort)     Bathing comments Patient reports attempting to wash her hair but limited by impaired by impaired sensation at right side of head     Toileting Independent     Grooming Minimum assist (75% patient effort)     Grooming comments Usually automatic toothbrush. Able to brush hair but difficulty with blowing hair     Eating Minimum assist (75% patient effort)     Eating comments diet is soft food only; requires assist with cutting into pieces. Incorporating RUE minimally        IADL/Home Interventions  Assessment    Home management/maintenance Minimum assist (75% patient effort)     Home management/maintenance comments Difficulty with hand washing dishes, using LUE for dusting, gets assistance with vacuuming due to heart condition.     Meal prep / clean up Maximum assist (25% patient effort)     Meal prep / clean up comments Able to reheat in microwave but not preparing food at this time, hot or cold                   Work and School        Gross and Fine Motor     Gross and Fine Motor - 11/14/23 1420          Hand  Strength Testing    Left Hand, Setting 2 34/36/38; Average = 36 lbs     Right Hand, Setting 2 6/5/5; Average = 5.3 lbs                   Outcome Measures    Outcome Measures - 11/14/23 1420          Objective Outcome Measures    RIGHT hand: Box and Blocks Assessment 53     LEFT hand: Box and Blocks Assessment 28     9 Hole Peg Test - RIGHT HAND TIME 16.65   Removing only using lateral pinch    9 Hole Peg Test - LEFT HAND TIME 24.06        Other Outcome Measures Used/Comments    Outcome measure used: Right MF to DPC = 1.5 cm                     ROM and MMT          11/28/2023    16:29 1/4/2024    19:06 1/11/2024    19:18 1/18/2024    17:11 3/26/2024    14:00 4/23/2024    17:00   OT UE ROM Measurements   AROM: Right Shoulder Flexion     150 degrees 135 degrees   AROM: Right Shoulder ABD     135 degrees       unable to move through full sagittal pain 91 degrees       limited by pain   AROM: Right Shoulder IR     20 degrees       hard end feel and pain --        WFL   AROM: Right Shoulder ER     --        WFL    AROM: Right Elbow Flex/Ext     WFL    AROM: Right Wrist Flexion     60 degrees    AROM: Right Wrist Extension     55 degrees    AROM: Right Wrist UD     50 degrees    AROM: Right Wrist RD     20 degrees    OT Cervical/Lumbar/Other ROM Measurements   Other: Girth Measurement/Comments  Right IF: P1-5.9 cm, PIP- 6cm, P2- 5.3 cm, DIP- 5.1 cm // Left IF: P1-5.6 cm, PIP-5.5 cm, P2- 4.9 cm, DIP-4.9 cm  // Right MCP [place]- 17.5 cm Right IF: P1-5.8 cm, PIP- 5.9cm, P2- 5.1 cm, DIP- 4.9 // Right MCP [place]- 17.5 cm Right IF: P1-5.9 cm, PIP- 5.9cm, P2- 5.2 cm, DIP- 4.9 // Right MCP [place]- 17.5 cm          .5 cm MF to DPC     OT UE MMT   Left Shoulder Flexion (4+/5) good plus        Left Shoulder Extension (4+/5) good plus        Left Shoulder ADD (4+/5) good plus        Left Shoulder ABD (4+/5) good plus        Left Shoulder IR (4+/5) good plus        Left Shoulder ER (4+/5) good plus        Left Elbow Flexion (4+/5) good plus        Left Elbow Extension (4+/5) good plus        Left Forearm Supination (4+/5) good plus        Left Forearm Pronation (4+/5) good plus        Left Wrist Flexion (4/5) good        Left Wrist Extension (4/5) good        Left Wrist UD (4/5) good        Left Wrist RD (4/5) good          Outcome Measures          12/14/2023    17:12 1/18/2024    17:11 2/6/2024    16:21 3/26/2024    14:10 4/23/2024    17:12 4/30/2024    21:59   OT OBJECTIVE Outcome Measures   9 Hole Peg Test - Right Hand 180       3 pegs placed 180       14.82- removal only 180       Removal only 32.46   --        Unable to complete   9 Hole Peg Test - Left Hand  25.84       removing only       9 Hole Peg Test - Comments  Patient able to place 5 pegs in pegboard in 3 minutes at right hand Patient placed 3 pegs into board today in 3 minutes      Right Hand Box and Blocks 24 23 23 15 21    Left Hand Box and Blocks    48         Today's Treatment:      OT NEURO FLOW SHEET    EXERCISES  Initial Evaluation  Progress Note 1  Progress Note 2  Re-evaluation CURRENT SESSION  8/16/2023  9/14/2023  10/23/23  11/14/23 TIME   NEURO RE-ED  CPT 97498 TOTAL TIME FOR SESSION 0-7 Minutes   AAROM/AROM PROM and AROM composite flexion at fingers of right hand    AROM tip pinch IF to thumb for improved motor coordination    Strengthening      Strength       Gross Motor Control     Fine Motor Control     Sitting Jennifer/Balance     Standing  Jennifer/Balance     Sensory re-education      Retraining     THERE ACT  CPT 28163 TOTAL TIME FOR SESSION 38-52 Minutes   Pain, Vitals, Meds, Etc. Vitals taken, pain assessed/monitored, Rest breaks provided for frustration management    Assessment Patient was 100% full participant in today's re-evaluation with objective metrics assessed as follow: Box and Block, 9 hole peg, and  strength    HEP 11/2/23: Review pinch strengthening positions today for improved carry over    10/26/23: Added soft theraputty for right hand pinch (lateral pinch, tip pinch, 3 jaw patti); patient expressed good understnading    Discussed carry over with use of SF splint    9/28/23: Laterality exercise added to HEP; patient and caregiver expressed good understanding    Right hand stretching    9/21/2023:   -Educated on use of rice/bean sensory bin, massage, sensory re-education with deep pressure and textures. Issued tubigrip for RUE sensory re-education and advised to wear 1x per day. Continued to discuss edema management.     Functional Mobility     Transfers     THERE EX  CPT 60578 TOTAL TIME FOR SESSION 0-7 Minutes   PROM     Activity Tolerance     SELF-CARE  CPT 44964 TOTAL TIME FOR SESSION 8-22 Minutes   Pt Education/Safety     ADL Training     IADL Training Patient participated in dressing set-up task with  from home today; she demonstrates ability to use RUE as functional assist and remove clothing from  without assistance.     MODALITIES  CPT 73810 TOTAL TIME FOR SESSION Not performed   Ice/Heat     ATTENDED E-STIM  CPT 11856 TOTAL TIME FOR SESSION Not performed   Attended E-Stim     SPLINTING  CPT 80373 TOTAL TIME FOR SESSION Not performed   Fabrication/Check Out     Fit     Training     GROUP  CPT 75459 TOTAL TIME FOR SESSION Not performed             Normative Range for Female 75+ Years of Age       Right Hand   Left Hand      Initial Evaluation Progress Note 1 Norm Initial Evaluation Progress Note 1 Norm     Strength 0 lbs 0 lbs 42.6 lbs 30 lbs 30 lbs 37.6 lbs   Lateral Pinch N/T 2 lbs 12.6 lbs N/T 14 lbs 11.4 lbs   Three Jaw Yonatan N/T 2 lbs (assist for position) 12 lbs N/T 11 lbs 11.5 lbs   Tip Pinch N/T N/T 9.6 lbs N/T 9 lbs 9.3 lbs   Box and Block 4 blocks 22 blocks 65 blocks 50 blocks 56 blocks 63.6 blocks   Nine Hole Peg (71+ age) Unable to complete Unable to complete 22.49 seconds N/T 24.55 seconds 24.11 seconds       Goals:     Goals        OT Neuro/Deconditioning Goals        Short Term Goals Time Frame Result Comment/Progress   Assess gross motor control via Box and Block Assessment  4 weeks Met 9/14: R: 22; L: 56   Assess fine motor control via 9 hole peg test 4 weeks Met  9/14: R: Unable; L: 24.55   Improved automatic fine motor use at right hand with routine tasks (e.g. eating, writing, brushing teeth) to 25% of baseline  4 weeks Ongoing     Explore adaptive visual strategies to encourage looking to right side 4 weeks Met    Incorporate right hand in at least 2 self-care activities 4 weeks Met    Carry over HEP at least 3 times per week  4 weeks Met     Score at least 32 on Box and block test 4 weeks Ongoing 2/7/24: 23 blocks  3/26/24: 15 blocks  4/23/24: 21 blocks   Increase right hand  strength to 8 lbs 4 weeks Met    Engage in bilateral fine motor coordination task to open food or self-care containers with supervision during 4/6 trials for increased independence with meal preparation.  4 weeks New goal    Complete at least 4 activities from RUE modified constraint induced movement therapy activity log across 3 weeks with assistance as needed. 4 weeks Met    Engage in simulated or real life food cutting activity using bilateral hands with no more than minimal assistance x 2 trials.  4 weeks Met    Perform RUE scapular internal rotation through at least 45 degrees with no more than 1 point increase in pain in order to promote joint mobilization and reduce scapular adhesion.  4 weeks New goal RE 2/26/24:  20 degrees, hard end feel and pain   Complete right hand three jaw patti or tip pinch activity to retrieve 1x1 inch block and place in target area with short opponens splint donned or manual inhibition of excessive opposition x 10 repetitions across 2 trials for improved coordination 4 weeks New goal       Long Term Goals Time Frame Result Comment/Progress   Improve gross motor control as evidenced by at least 10 block improved at RUE with Box and Block Assessment for improved ability to perform ADLs and IADLs  12 weeks Met     Patient will complete 9 hole peg test in under 3 minutes 12 weeks Ongoing    Decrease distance from Right MF to DPC to 2 cm for improved functional grasp at right hand 12 weeks Met     Improved automatic fine motor use at right hand with routine tasks (e.g. eating, writing, brushing teeth) to 75% of baseline 12 weeks Ongoing     Increase right hand  strength to at least 15 lbs 12 weeks Ongoing 3/26/24: 6 lbs  4/23/24: 11 lbs   Increase Box and Block score to at least 40 12 weeks Ongoing 2/7/24: 23 blocks  3/26/24: 15 blocks                      This 79 y.o. year old female presents to OT with above stated diagnosis. Occupational Therapy evaluation reveals coordination impaired, impaired cognition, decreased flexibility, sensation decreased resulting in self-care, home management, community/leisure limitations. Jennifer Sams will benefit from skilled OT services to address limitation, work towards rehab and patient goals and maximize PLOF of chosen ADLs.    Planned Services: The patients treatment will include CPT 04512 Neuromuscular Reeducation, CPT 75183 Self-care/Home management training, CPT 06945 Therapeutic activities, CPT 49188 Hot/Cold Packs therapy, .    Barry Rosas, OT

## 2024-05-01 NOTE — ADDENDUM NOTE
Encounter addended by: Barry Rosas OT on: 5/1/2024 3:25 PM   Actions taken: Clinical Note Signed, Delete clinical note

## 2024-05-01 NOTE — PROGRESS NOTES
Occupational Therapy Visit    OT DAILY NOTE FOR OUTPATIENT THERAPY    Patient: Jennifer Sams   MRN: [id number]  : 1945 79 y.o.  Referring Physician: Ney Aparicio,*  Date of Visit: 2024      Certification Dates: 24 through 24    Diagnosis:   1. Ischemic stroke (CMS/HCC)    2. Lack of coordination        Chief Complaints:   Brain Injury    Precautions:  Existing Precautions/Restrictions: cardiac  Precautions comments: Pacemaker    TODAY'S VISIT    Time In Session:  Start Time: 1503  Stop Time: 1600  Time Calculation (min): 57 min   History/Vitals/Pain/Encounter Info - 24 1509          Injury History/Precautions/Daily Required Info    Document Type daily treatment     Primary Therapist Barry Rosas OTR/CARYN     Chief Complaint/Reason for Visit  Brain Injury     Onset of Illness/Injury or Date of Surgery 23     Referring Physician Dr. Ney Aparicio     Existing Precautions/Restrictions cardiac     Precautions comments Pacemaker     History of present illness/functional impairment The patient is a 78-year-old  female with a history of AAA, heart block s/p cardiac pacemaker, glaucoma, hyperlipidemia, coronary artery disease, NSTEMI, who presented to [facility] on  after she has not been seen by her friends for a few days.  Patient lives alone and when she was found by EMS she was confused and combative.  In the emergency department she was aphasic with right-sided weakness and hypertensive to the 190 systolic.  CT scan of the head revealed an acute infarct in left MCA with mild bleeding.  Echo showed an EF of 43% and a bubble study was negative.  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  Later this was transitioned to apixaban.  She was continued on statin for secondary prevention.  She required virtual shivani while in the hospital but this was not effective.   She was evaluated by speech therapy and cleared for a soft and bite-size diet with moderately thick liquids.'     Patient/Family/Caregiver Comments/Observations Patient arrived with good mood and normal affect     Patient reported fall since last visit No        Pain Assessment    Currently in pain No/Denies     Preferred Pain Scale FACES (Field-Ware FACES Pain Rating Scale)     Pain Rating (0-10): Pre Activity 0     Pain Rating (0-10): Activity 6   w/ flexion        Services    Do You Speak a Language Other Than English at Home? no                    Daily Treatment Assessment and Plan - 04/30/24 2153          Daily Treatment Assessment and Plan    Progress toward goals Progressing     Daily Outcome Summary Patient seen in OT unaccompanied and fine motor control retested at right hand, with patient unable to fill pegboard in allotted time (3 min). Patient c/o intermittent 6/10 pain at RUE, indicating location from right elbow to shoulder, and worse in flexion. She expressed good understanding with therapist instruction to discontinue right shoulder flexion AROM stretches and performed codmans exercise in session with good tolerance. Patient required frequent verbal cueing to identify preferred simple meal for meal planning activity and quick to identify barriers to attempting.     Plan and Recommendations Complete fine motor control testing. Plan out simple meal for improved independence with meal prep                         OBJECTIVE DATA TAKEN TODAY    Outcome Measures    Outcome Measures - 04/30/24 2159          Objective Outcome Measures    9 Hole Peg Test - RIGHT HAND TIME --   Unable to complete                    Today's Treatment:    OT NEURO FLOW SHEET    EXERCISES CURRENT SESSION TIME   NEURO RE-ED  CPT 71674 TOTAL TIME FOR SESSION 8-22 Minutes   AAROM/AROM Patient performed Codman's exercise for improved right shoulder ROM w/o pain; patient with good return demonstration    Strengthening       Strength     Gross Motor Control Grasp and in-hand manipulation with spherical objects; incorporated bilateral coordination with passing between hands    Fine Motor Control     Sitting Jennifer/Balance     Standing Jennifer/Balance      Retraining     THERE ACT  CPT 73339 TOTAL TIME FOR SESSION 8-22 Minutes   Assessment 9 hole peg test    Pain, Vitals, Meds, Etc. Vitals taken. Pain assessed and monitored    HEP     Functional Mobility     Transfers     THERE EX  CPT 33485 TOTAL TIME FOR SESSION Not performed   PROM     Activity Tolerance     SELF-CARE  CPT 23163 TOTAL TIME FOR SESSION 8-22 Minutes   Pt Education/Safety     ADL Training Cup grasp and hold with right hand; focused on full hand grasp at right hand    Collaborated with patient in discussion of improvement with meal prep; discussed current barriers preventing independence    IADL Training     MODALITIES  CPT 20655 TOTAL TIME FOR SESSION Not performed   Ice/Heat     ATTENDED E-STIM  CPT 65537 TOTAL TIME FOR SESSION Not performed   Attended E-Stim     SPLINTING  CPT 67844 TOTAL TIME FOR SESSION Not performed   Fabrication/Check Out     Fit     Training     GROUP  CPT 50178 TOTAL TIME FOR SESSION Not performed

## 2024-05-01 NOTE — OP OT TREATMENT LOG
OT NEURO FLOW SHEET    EXERCISES CURRENT SESSION TIME   NEURO RE-ED  CPT 65070 TOTAL TIME FOR SESSION 8-22 Minutes   AAROM/AROM Patient performed Codman's exercise for improved right shoulder ROM w/o pain; patient with good return demonstration    Strengthening      Strength     Gross Motor Control Grasp and in-hand manipulation with spherical objects; incorporated bilateral coordination with passing between hands    Fine Motor Control     Sitting Jennifer/Balance     Standing Jennifer/Balance      Retraining     THERE ACT  CPT 95833 TOTAL TIME FOR SESSION 8-22 Minutes   Assessment 9 hole peg test    Pain, Vitals, Meds, Etc. Vitals taken. Pain assessed and monitored    HEP     Functional Mobility     Transfers     THERE EX  CPT 35347 TOTAL TIME FOR SESSION Not performed   PROM     Activity Tolerance     SELF-CARE  CPT 88770 TOTAL TIME FOR SESSION 8-22 Minutes   Pt Education/Safety     ADL Training Cup grasp and hold with right hand; focused on full hand grasp at right hand    Collaborated with patient in discussion of improvement with meal prep; discussed current barriers preventing independence    IADL Training     MODALITIES  CPT 07107 TOTAL TIME FOR SESSION Not performed   Ice/Heat     ATTENDED E-STIM  CPT 14124 TOTAL TIME FOR SESSION Not performed   Attended E-Stim     SPLINTING  CPT 31321 TOTAL TIME FOR SESSION Not performed   Fabrication/Check Out     Fit     Training     GROUP  CPT 54432 TOTAL TIME FOR SESSION Not performed

## 2024-05-01 NOTE — ADDENDUM NOTE
Encounter addended by: Barry Rosas OT on: 5/1/2024 3:36 PM   Actions taken: Clinical Note Signed
yes

## 2024-05-01 NOTE — PROGRESS NOTES
Duplicate note for purposes of resending historical OT POC.    Occupational Therapy Progress Note    [facility] Fax: [phone]    OT RE-EVALUATION FOR OUTPATIENT THERAPY    Patient: Jennifer Sams   MRN: [id number]  : 1945 79 y.o.    Referring Physician: Liv Haywood, *  Date of Visit: 2023    New Certification Dates: 23 through 24    Recommended Frequency & Duration:  2 times/week for up to 3 months         Diagnosis:   1. Lack of coordination    2. Acute ischemic left MCA stroke (CMS/HCC)        Chief Complaints:  No chief complaint on file.      Precautions:        TODAY'S VISIT:    Time In Session:  Start Time: 1405  Stop Time: 1500  Time Calculation (min): 55 min   General Information - 23 1419          Session Details    Document Type daily treatment        General Information    Onset of Illness/Injury or Date of Surgery 23     Referring Physician Dr. Haywood     History of present illness/functional impairment The patient is a 78-year-old  female with a history of AAA, heart block s/p cardiac pacemaker, glaucoma, hyperlipidemia, coronary artery disease, NSTEMI, who presented to [facility] on  after she has not been seen by her friends for a few days.  Patient lives alone and when she was found by EMS she was confused and combative.  In the emergency department she was aphasic with right-sided weakness and hypertensive to the 190 systolic.  CT scan of the head revealed an acute infarct in left MCA with mild bleeding.  Echo showed an EF of 43% and a bubble study was negative.  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  Later this was transitioned to apixaban.  She was continued on statin for secondary prevention.  She required virtual shivani while in the hospital but this was not effective.  She was evaluated by speech therapy and cleared for a  soft and bite-size diet with moderately thick liquids.'     Patient/Family/Caregiver Comments/Observations Patient presents to OT session and is agreeable to re-eval         Services    Do You Speak a Language Other Than English at Home? no                      Pain/Vitals - 11/14/23 1419          Pain Assessment    Currently in pain No/Denies                    OT - 11/14/23 1419          Occupational Therapy Encounter Type Details    Occupational Therapy Specialty Traditional Neuro Program OT        OT Frequency and Duration    Frequency of treatment 2 times/week     OT Duration 3 months     OT Cert From 11/14/23     OT Cert To 02/12/24     Date OT POC was sent to provider 11/14/23     Signed OT Plan of Care received?  Yes                    Assessment - 11/14/23 1419          Assessment    Plan of Care reviewed and patient/family in agreement Yes     Comments Patient is in agreeement     System Pathology/Pathophysiology Noted neuromuscular     Functional Limitations in Following Categories self-care;home management;community/leisure     Problem List: Occupational Therapy coordination impaired;impaired cognition;decreased flexibility;sensation decreased     Rehab Potential/Prognosis: Occupational Therapy good, to achieve stated therapy goals     Clinical Assessment Patient seen in OP OT for re-evaluation and arrives unaccompanied today. Patient was seen for initial evaluation on 8/16/23 and presented with significant impairments in functional use of RUE including deficits in sensation, strength, fine and gross motor control. Patient has been seen for 17 OT visits including today. Objective metrics assessed today as follow: Box and Block score at left hand is 53 blocks today (IE: 30 blocks, norms: 63.6 blocks) and at right hand is 28 blocks (IE: 4, norms: 65); 9 hole peg test score at 24.06 sec (norms: 24.11 sec); right hand unable to complete test and modified to only include removing pegs, with  improvement to 16.65 seconds todays as compared to 48.6 seconds at first measurement. AROM at right hand has improved per distance from MF to DPC, measured at 1.5 cm today as compared to 6.5 cm at IE. Right hand  strength has improved to 5.3 lbs as compared to 0 lbs at IE, and left hand  strength has improved to 36 lbs as compared to 30 lbs at IE. Subjectively patient reports improvement in independence at home with ADLs and with increase in bilateral coordination for performing routine task including tying her shoes, drying off after showering, and performing dressing. Patient continues to experience neuromuscular deficits at RUE impairing performance with ADL, IADL and leisure occupational performance at this time. Recommend exension of OT POC 2x/week for 3 months and patient is agreeable to plan.     Plan and Recommendations Extend OT POC for improved RUE ROM, strength, coordination, and functional engagement in ADL/IADL/leisure     Planned Services CPT 89206 Neuromuscular Reeducation;CPT 95851 Self-care/Home management training;CPT 07377 Therapeutic activities;CPT 61724 Hot/Cold Packs therapy                     OBJECTIVE MEASUREMENTS/DATA:    BADL/IADL    BADL/IADL - 11/14/23 1420          BADL Interventions Assessment    Upper Body Dressing Modified independence     Lower Body Dressing Modified independence     Lower body dressing comments Intermittent success with tying shoes; taking 10 minutes to get dressing     Bathing Minimum assist (75% patient effort)     Bathing comments Patient reports attempting to wash her hair but limited by impaired by impaired sensation at right side of head     Toileting Independent     Grooming Minimum assist (75% patient effort)     Grooming comments Usually automatic toothbrush. Able to brush hair but difficulty with blowing hair     Eating Minimum assist (75% patient effort)     Eating comments diet is soft food only; requires assist with cutting into pieces.  Incorporating RUE minimally        IADL/Home Interventions Assessment    Home management/maintenance Minimum assist (75% patient effort)     Home management/maintenance comments Difficulty with hand washing dishes, using LUE for dusting, gets assistance with vacuuming due to heart condition.     Meal prep / clean up Maximum assist (25% patient effort)     Meal prep / clean up comments Able to reheat in microwave but not preparing food at this time, hot or cold                   Gross and Fine Motor     Gross and Fine Motor - 11/14/23 1420          Hand  Strength Testing    Left Hand, Setting 2 34/36/38; Average = 36 lbs     Right Hand, Setting 2 6/5/5; Average = 5.3 lbs                   Outcome Measures    Outcome Measures - 11/14/23 1420          Objective Outcome Measures    RIGHT hand: Box and Blocks Assessment 53     LEFT hand: Box and Blocks Assessment 28     9 Hole Peg Test - RIGHT HAND TIME 16.65   Removing only using lateral pinch    9 Hole Peg Test - LEFT HAND TIME 24.06        Other Outcome Measures Used/Comments    Outcome measure used: Right MF to DPC = 1.5 cm                     ROM and MMT          11/28/2023    16:29 1/4/2024    19:06 1/11/2024    19:18 1/18/2024    17:11 3/26/2024    14:00 4/23/2024    17:00   OT UE ROM Measurements   AROM: Right Shoulder Flexion     150 degrees 135 degrees   AROM: Right Shoulder ABD     135 degrees       unable to move through full sagittal pain 91 degrees       limited by pain   AROM: Right Shoulder IR     20 degrees       hard end feel and pain --        WFL   AROM: Right Shoulder ER     --        WFL    AROM: Right Elbow Flex/Ext     WFL    AROM: Right Wrist Flexion     60 degrees    AROM: Right Wrist Extension     55 degrees    AROM: Right Wrist UD     50 degrees    AROM: Right Wrist RD     20 degrees    OT Cervical/Lumbar/Other ROM Measurements   Other: Girth Measurement/Comments  Right IF: P1-5.9 cm, PIP- 6cm, P2- 5.3 cm, DIP- 5.1 cm // Left IF: P1-5.6  cm, PIP-5.5 cm, P2- 4.9 cm, DIP-4.9 cm // Right MCP [place]- 17.5 cm Right IF: P1-5.8 cm, PIP- 5.9cm, P2- 5.1 cm, DIP- 4.9 // Right MCP [place]- 17.5 cm Right IF: P1-5.9 cm, PIP- 5.9cm, P2- 5.2 cm, DIP- 4.9 // Right MCP [place]- 17.5 cm          .5 cm MF to DPC     OT UE MMT   Left Shoulder Flexion (4+/5) good plus        Left Shoulder Extension (4+/5) good plus        Left Shoulder ADD (4+/5) good plus        Left Shoulder ABD (4+/5) good plus        Left Shoulder IR (4+/5) good plus        Left Shoulder ER (4+/5) good plus        Left Elbow Flexion (4+/5) good plus        Left Elbow Extension (4+/5) good plus        Left Forearm Supination (4+/5) good plus        Left Forearm Pronation (4+/5) good plus        Left Wrist Flexion (4/5) good        Left Wrist Extension (4/5) good        Left Wrist UD (4/5) good        Left Wrist RD (4/5) good          Outcome Measures          12/14/2023    17:12 1/18/2024    17:11 2/6/2024    16:21 3/26/2024    14:10 4/23/2024    17:12 4/30/2024    21:59   OT OBJECTIVE Outcome Measures   9 Hole Peg Test - Right Hand 180       3 pegs placed 180       14.82- removal only 180       Removal only 32.46   --        Unable to complete   9 Hole Peg Test - Left Hand  25.84       removing only       9 Hole Peg Test - Comments  Patient able to place 5 pegs in pegboard in 3 minutes at right hand Patient placed 3 pegs into board today in 3 minutes      Right Hand Box and Blocks 24 23 23 15 21    Left Hand Box and Blocks    48         Today's Treatment:      OT NEURO FLOW SHEET    EXERCISES  Initial Evaluation  Progress Note 1  Progress Note 2  Re-evaluation CURRENT SESSION  8/16/2023  9/14/2023  10/23/23  11/14/23 TIME   NEURO RE-ED  CPT 36180 TOTAL TIME FOR SESSION 0-7 Minutes   AAROM/AROM PROM and AROM composite flexion at fingers of right hand    AROM tip pinch IF to thumb for improved motor coordination    Strengthening      Strength       Gross Motor Control     Fine Motor Control      Sitting Jennifer/Balance     Standing Jennifer/Balance     Sensory re-education      Retraining     THERE ACT  CPT 00638 TOTAL TIME FOR SESSION 38-52 Minutes   Pain, Vitals, Meds, Etc. Vitals taken, pain assessed/monitored, Rest breaks provided for frustration management    Assessment Patient was 100% full participant in today's re-evaluation with objective metrics assessed as follow: Box and Block, 9 hole peg, and  strength    HEP 11/2/23: Review pinch strengthening positions today for improved carry over    10/26/23: Added soft theraputty for right hand pinch (lateral pinch, tip pinch, 3 jaw patti); patient expressed good understnading    Discussed carry over with use of SF splint    9/28/23: Laterality exercise added to HEP; patient and caregiver expressed good understanding    Right hand stretching    9/21/2023:   -Educated on use of rice/bean sensory bin, massage, sensory re-education with deep pressure and textures. Issued tubigrip for RUE sensory re-education and advised to wear 1x per day. Continued to discuss edema management.     Functional Mobility     Transfers     THERE EX  CPT 20483 TOTAL TIME FOR SESSION 0-7 Minutes   PROM     Activity Tolerance     SELF-CARE  CPT 78673 TOTAL TIME FOR SESSION 8-22 Minutes   Pt Education/Safety     ADL Training     IADL Training Patient participated in dressing set-up task with  from home today; she demonstrates ability to use RUE as functional assist and remove clothing from  without assistance.     MODALITIES  CPT 60114 TOTAL TIME FOR SESSION Not performed   Ice/Heat     ATTENDED E-STIM  CPT 96856 TOTAL TIME FOR SESSION Not performed   Attended E-Stim     SPLINTING  CPT 41454 TOTAL TIME FOR SESSION Not performed   Fabrication/Check Out     Fit     Training     GROUP  CPT 48745 TOTAL TIME FOR SESSION Not performed             Normative Range for Female 75+ Years of Age       Right Hand   Left Hand      Initial Evaluation Progress Note 1 Norm Initial  Evaluation Progress Note 1 Norm    Strength 0 lbs 0 lbs 42.6 lbs 30 lbs 30 lbs 37.6 lbs   Lateral Pinch N/T 2 lbs 12.6 lbs N/T 14 lbs 11.4 lbs   Three Jaw Yonatan N/T 2 lbs (assist for position) 12 lbs N/T 11 lbs 11.5 lbs   Tip Pinch N/T N/T 9.6 lbs N/T 9 lbs 9.3 lbs   Box and Block 4 blocks 22 blocks 65 blocks 50 blocks 56 blocks 63.6 blocks   Nine Hole Peg (71+ age) Unable to complete Unable to complete 22.49 seconds N/T 24.55 seconds 24.11 seconds       Goals:     Goals        OT Neuro/Deconditioning Goals        Short Term Goals Time Frame Result Comment/Progress   Assess gross motor control via Box and Block Assessment  4 weeks Met 9/14: R: 22; L: 56   Assess fine motor control via 9 hole peg test 4 weeks Met  9/14: R: Unable; L: 24.55   Improved automatic fine motor use at right hand with routine tasks (e.g. eating, writing, brushing teeth) to 25% of baseline  4 weeks Ongoing     Explore adaptive visual strategies to encourage looking to right side 4 weeks Met    Incorporate right hand in at least 2 self-care activities 4 weeks Met    Carry over HEP at least 3 times per week  4 weeks Met     Score at least 32 on Box and block test 4 weeks Ongoing 2/7/24: 23 blocks  3/26/24: 15 blocks  4/23/24: 21 blocks   Increase right hand  strength to 8 lbs 4 weeks Met    Engage in bilateral fine motor coordination task to open food or self-care containers with supervision during 4/6 trials for increased independence with meal preparation.  4 weeks New goal    Complete at least 4 activities from RUE modified constraint induced movement therapy activity log across 3 weeks with assistance as needed. 4 weeks Met    Engage in simulated or real life food cutting activity using bilateral hands with no more than minimal assistance x 2 trials.  4 weeks Met    Perform RUE scapular internal rotation through at least 45 degrees with no more than 1 point increase in pain in order to promote joint mobilization and reduce scapular  adhesion.  4 weeks New goal RE 2/26/24: 20 degrees, hard end feel and pain   Complete right hand three jaw patti or tip pinch activity to retrieve 1x1 inch block and place in target area with short opponens splint donned or manual inhibition of excessive opposition x 10 repetitions across 2 trials for improved coordination 4 weeks New goal       Long Term Goals Time Frame Result Comment/Progress   Improve gross motor control as evidenced by at least 10 block improved at RUE with Box and Block Assessment for improved ability to perform ADLs and IADLs  12 weeks Met     Patient will complete 9 hole peg test in under 3 minutes 12 weeks Ongoing    Decrease distance from Right MF to DPC to 2 cm for improved functional grasp at right hand 12 weeks Met     Improved automatic fine motor use at right hand with routine tasks (e.g. eating, writing, brushing teeth) to 75% of baseline 12 weeks Ongoing     Increase right hand  strength to at least 15 lbs 12 weeks Ongoing 3/26/24: 6 lbs  4/23/24: 11 lbs   Increase Box and Block score to at least 40 12 weeks Ongoing 2/7/24: 23 blocks  3/26/24: 15 blocks                      This 79 y.o. year old female presents to OT with above stated diagnosis. Occupational Therapy evaluation reveals coordination impaired, impaired cognition, decreased flexibility, sensation decreased resulting in self-care, home management, community/leisure limitations. Jennifer Sams will benefit from skilled OT services to address limitation, work towards rehab and patient goals and maximize PLOF of chosen ADLs.    Planned Services: The patients treatment will include CPT 65893 Neuromuscular Reeducation, CPT 22131 Self-care/Home management training, CPT 88674 Therapeutic activities, CPT 15579 Hot/Cold Packs therapy, .    Barry Rosas OT

## 2024-05-03 NOTE — ADDENDUM NOTE
Encounter addended by: Barry Rosas OT on: 5/3/2024 9:13 AM   Actions taken: Charge Capture section accepted

## 2024-05-10 ENCOUNTER — HOSPITAL ENCOUNTER (OUTPATIENT)
Dept: OCCUPATIONAL THERAPY | Age: 79
Setting detail: THERAPIES SERIES
Discharge: HOME | End: 2024-05-10
Attending: INTERNAL MEDICINE
Payer: MEDICARE

## 2024-05-10 DIAGNOSIS — I63.9 ISCHEMIC STROKE (CMS/HCC): Primary | ICD-10-CM

## 2024-05-10 DIAGNOSIS — R27.9 LACK OF COORDINATION: ICD-10-CM

## 2024-05-10 PROCEDURE — 97535 SELF CARE MNGMENT TRAINING: CPT | Mod: GO,KX

## 2024-05-10 PROCEDURE — 97140 MANUAL THERAPY 1/> REGIONS: CPT | Mod: GO,KX

## 2024-05-10 PROCEDURE — 97112 NEUROMUSCULAR REEDUCATION: CPT | Mod: GO,KX

## 2024-05-10 NOTE — PROGRESS NOTES
Occupational Therapy Visit    OT DAILY NOTE FOR OUTPATIENT THERAPY    Patient: Jennifer Sams   MRN: [id number]  : 1945 79 y.o.  Referring Physician: Ney Aparicio,*  Date of Visit: 5/10/2024      Certification Dates: 24 through 24    Diagnosis:   1. Ischemic stroke (CMS/HCC)    2. Lack of coordination        Chief Complaints:   Brain Injury    Precautions:  Existing Precautions/Restrictions: cardiac  Precautions comments: Pacemaker    TODAY'S VISIT    Time In Session:  Start Time: 1400  Stop Time: 1500  Time Calculation (min): 60 min   History/Vitals/Pain/Encounter Info - 05/10/24 1403          Injury History/Precautions/Daily Required Info    Document Type daily treatment     Primary Therapist Barry Rosas OTR/CARYN     Chief Complaint/Reason for Visit  Brain Injury     Onset of Illness/Injury or Date of Surgery 23     Referring Physician Dr. Ney Aparicio     Existing Precautions/Restrictions cardiac     Precautions comments Pacemaker     History of present illness/functional impairment The patient is a 78-year-old  female with a history of AAA, heart block s/p cardiac pacemaker, glaucoma, hyperlipidemia, coronary artery disease, NSTEMI, who presented to [facility] on  after she has not been seen by her friends for a few days.  Patient lives alone and when she was found by EMS she was confused and combative.  In the emergency department she was aphasic with right-sided weakness and hypertensive to the 190 systolic.  CT scan of the head revealed an acute infarct in left MCA with mild bleeding.  Echo showed an EF of 43% and a bubble study was negative.  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  Later this was transitioned to apixaban.  She was continued on statin for secondary prevention.  She required virtual shivani while in the hospital but this was not effective.   She was evaluated by speech therapy and cleared for a soft and bite-size diet with moderately thick liquids.'     Patient/Family/Caregiver Comments/Observations Patient arrives accompanied by caregiver Thelma     Patient reported fall since last visit No        Pain Assessment    Currently in pain Yes     Pain Rating (0-10): Pre Activity 9        Pain Interventions    Intervention  monitored     Post Intervention Comments stable with intervention        Pre Activity Vital Signs    /60     BP Location Left upper arm     BP Method Manual     Patient Position Sitting         Services    Do You Speak a Language Other Than English at Home? no                    Daily Treatment Assessment and Plan - 05/10/24 1532          Daily Treatment Assessment and Plan    Progress toward goals Progressing     Daily Outcome Summary Patient seen in OT for improved functional use of RUE and performance with ADLs/IADLs. She reports continued 9/10 pain at right shoulder and therapist re-educated patient to discontinue any exercise or activity that is provoking pain; patient able to identify that pain might be provoked with opening windows in her home, which she performs daily, and therapist recommends for patient to get help with this task. She expressed good understanding. Education provided on proper sleep positioning for pain management and patient trialled use of pillows to support RUE. She appears to be closely carrying over recommended position at home per her report but with intermittent success and therapist encouraged consistent practice. Right hand thumb spica splint fabricated to facilitate improved thumb position with fine motor control tasks and therapist ensured good fit today; patient appeared to benefit from wear as evidenced by improved pinch grasp with fine motor activity today.     Plan and Recommendations F/U on bed positioning. Use splint for right hand fine motor control. Plan out simple meal for  improved independence with meal prep                         OBJECTIVE DATA TAKEN TODAY    None Taken    Today's Treatment:    OT NEURO FLOW SHEET    EXERCISES CURRENT SESSION TIME   NEURO RE-ED  CPT 10074 TOTAL TIME FOR SESSION 8-22 Minutes   AAROM/AROM     Strengthening      Strength     Gross Motor Control     Fine Motor Control Patient participated in block grasp and place activity while donning thumb splint for improved ability to use right hand with fine motor control tasks    Sitting Jennifer/Balance     Standing Jennifer/Balance      Retraining     THERE ACT  CPT 45120 TOTAL TIME FOR SESSION 0-7 Minutes   Assessment     Pain, Vitals, Meds, Etc. Vitals taken. Pain assessed and monitored    HEP     Functional Mobility     Transfers     THERE EX  CPT 04681 TOTAL TIME FOR SESSION Not performed   PROM     Activity Tolerance     SELF-CARE  CPT 86639 TOTAL TIME FOR SESSION 8-22 Minutes   Pt Education/Safety     ADL Training Education provided on sleep positioning to reduce pain at right shoulder and patient trialed recommended sleep position on Right side with use of pillow to support right arm.    IADL Training     MODALITIES  CPT 68407 TOTAL TIME FOR SESSION Not performed   Ice/Heat     ATTENDED E-STIM  CPT 03171 TOTAL TIME FOR SESSION Not performed   Attended E-Stim     SPLINTING  CPT 78467 TOTAL TIME FOR SESSION 23-37 Minutes   Fabrication/Check Out Therapist fabricated right hand based thumb spica splint to facilitate improved functional position of right thumb with fine motor control tasks. Therapist ensured comfortable fit and modified splint to prevent areas of pressure on skin. Splint will be used in OT sessions only and not issued to patient to ensure safe and proper use, in the context of decreased sensation at right hand. Patient expressed good understanding.    Fit     Training     GROUP  CPT 66373 TOTAL TIME FOR SESSION Not performed

## 2024-05-10 NOTE — OP OT TREATMENT LOG
OT NEURO FLOW SHEET    EXERCISES CURRENT SESSION TIME   NEURO RE-ED  CPT 55324 TOTAL TIME FOR SESSION 8-22 Minutes   AAROM/AROM     Strengthening      Strength     Gross Motor Control     Fine Motor Control Patient participated in block grasp and place activity while donning thumb splint for improved ability to use right hand with fine motor control tasks    Sitting Jennifer/Balance     Standing Jennifer/Balance      Retraining     THERE ACT  CPT 47592 TOTAL TIME FOR SESSION 0-7 Minutes   Assessment     Pain, Vitals, Meds, Etc. Vitals taken. Pain assessed and monitored    HEP     Functional Mobility     Transfers     THERE EX  CPT 57222 TOTAL TIME FOR SESSION Not performed   PROM     Activity Tolerance     SELF-CARE  CPT 66731 TOTAL TIME FOR SESSION 8-22 Minutes   Pt Education/Safety     ADL Training Education provided on sleep positioning to reduce pain at right shoulder and patient trialed recommended sleep position on Right side with use of pillow to support right arm.    IADL Training     MODALITIES  CPT 72166 TOTAL TIME FOR SESSION Not performed   Ice/Heat     ATTENDED E-STIM  CPT 01176 TOTAL TIME FOR SESSION Not performed   Attended E-Stim     SPLINTING  CPT 89573 TOTAL TIME FOR SESSION 23-37 Minutes   Fabrication/Check Out Therapist fabricated right hand based thumb spica splint to facilitate improved functional position of right thumb with fine motor control tasks. Therapist ensured comfortable fit and modified splint to prevent areas of pressure on skin. Splint will be used in OT sessions only and not issued to patient to ensure safe and proper use, in the context of decreased sensation at right hand. Patient expressed good understanding.    Fit     Training     GROUP  CPT 57710 TOTAL TIME FOR SESSION Not performed

## 2024-05-13 DIAGNOSIS — I63.9 CARDIOEMBOLIC STROKE (CMS/HCC): Primary | ICD-10-CM

## 2024-05-14 ENCOUNTER — HOSPITAL ENCOUNTER (OUTPATIENT)
Dept: OCCUPATIONAL THERAPY | Age: 79
Setting detail: THERAPIES SERIES
Discharge: HOME | End: 2024-05-14
Attending: INTERNAL MEDICINE
Payer: MEDICARE

## 2024-05-14 ENCOUNTER — HOSPITAL ENCOUNTER (OUTPATIENT)
Dept: SPEECH THERAPY | Age: 79
Setting detail: THERAPIES SERIES
Discharge: HOME | End: 2024-05-14
Attending: INTERNAL MEDICINE
Payer: MEDICARE

## 2024-05-14 DIAGNOSIS — I63.512 ACUTE ISCHEMIC LEFT MCA STROKE (CMS/HCC): ICD-10-CM

## 2024-05-14 DIAGNOSIS — I63.9 CARDIOEMBOLIC STROKE (CMS/HCC): ICD-10-CM

## 2024-05-14 DIAGNOSIS — I63.9 CEREBROVASCULAR ACCIDENT (CVA), UNSPECIFIED MECHANISM (CMS/HCC): Primary | ICD-10-CM

## 2024-05-14 DIAGNOSIS — R27.9 LACK OF COORDINATION: ICD-10-CM

## 2024-05-14 DIAGNOSIS — I63.9 ISCHEMIC STROKE (CMS/HCC): Primary | ICD-10-CM

## 2024-05-14 PROCEDURE — 92507 TX SP LANG VOICE COMM INDIV: CPT | Mod: GN

## 2024-05-14 PROCEDURE — 97535 SELF CARE MNGMENT TRAINING: CPT | Mod: GO,KX

## 2024-05-14 PROCEDURE — 97112 NEUROMUSCULAR REEDUCATION: CPT | Mod: GO,KX,59

## 2024-05-14 PROCEDURE — 200200 PR NO CHARGE: Performed by: INTERNAL MEDICINE

## 2024-05-14 NOTE — OP OT TREATMENT LOG
OT NEURO FLOW SHEET    EXERCISES CURRENT SESSION TIME   NEURO RE-ED  CPT 33267 TOTAL TIME FOR SESSION 8-22 Minutes   AAROM/AROM Patient performed Codman's exercise at right shoulder for gentle stretching    Strengthening      Strength     Gross Motor Control     Fine Motor Control Patient wore hand-based thumb spica for the following fine motor control tasks:  - grasp and hold pen  - grasp and hold spoon (with and without universal cuff)  - remove metal pegs from pegboard    Sitting Jennifer/Balance     Standing Jennifer/Balance      Retraining     THERE ACT  CPT 02411 TOTAL TIME FOR SESSION 0-7 Minutes   Assessment     Pain, Vitals, Meds, Etc. Pain assessed and monitored; gentle compression provided at right shoulder by therapist for pain management, and with good tolerance    HEP     Functional Mobility     Transfers     THERE EX  CPT 71622 TOTAL TIME FOR SESSION Not performed   PROM     Activity Tolerance     SELF-CARE  CPT 38594 TOTAL TIME FOR SESSION 23-37 Minutes   Pt Education/Safety     ADL Training     IADL Training Patient participated in adaptive meal prep activity with use of tools including adaptive jar opener, universal cuff and pop top lid opener. Patient instructed on technique for use of items and demonstrates good understanding.    MODALITIES  CPT 06336 TOTAL TIME FOR SESSION Not performed   Ice/Heat     ATTENDED E-STIM  CPT 29790 TOTAL TIME FOR SESSION Not performed   Attended E-Stim     SPLINTING  CPT 06033 TOTAL TIME FOR SESSION Not performed   Fabrication/Check Out     Fit     Training     GROUP  CPT 91289 TOTAL TIME FOR SESSION Not performed

## 2024-05-14 NOTE — OP SLP TREATMENT LOG
BDAE  Eval: 8/18/2023     RE: 11/14/2023    RE: 2/8/2024 RE: 5/14/2024   Aphasia Severity Rating Scale 1/5  2/5 2/5 3/5   CONVERSATIONAL and EXPOSITORY SPEECH          Simple Social Responses Raw Score:   Percentiles:  Raw Score: 6/7  Percentiles: 50%tile Raw Score: 6/7  Percentiles: 50%tile  Missing street number only Raw Score: 6/7  Percentiles: 50%tile  Missing street number only   Complexity Index  Raw Score:    Ratio:   Percentiles:         Narrative Discourse  Short form Raw Score:   Ratio:   Percentiles:         AUDITORY COMPREHEN         Basic Word Discrimination             Raw Score: 29/37  Percentiles:20%             Raw Score: 33/37  Percentiles:40-50% Body:  shoulder, letters: T,N   #: 257     Raw Score: 33/37  Percentiles:40-50% Body:  shoulder, letters: T,N   #: 257     Raw Score: 33/37  Percentiles:40-50%   Commands  Standard Form  8/25/2023 Raw Score: 3/15  Percentiles: 0-10% Raw Score: 7/15  Percentiles: 10-20% Raw Score: 10/15  Percentiles: 20% Raw Score: 7/15  Percentiles: 10-20%      Complex Ideational Material  Short Form  8/25/2023    Raw Score: 0/4  Percentiles: 0%  Short Form  8/25/2023     Raw Score: 5/6  Percentiles: 70%  Short Form       Raw Score: 7/12  Percentiles: 40%  Long form   Raw Score: 4/6  Percentiles: 50%  Short Form   ARTICULATION  Agility                   ORAL EXPRESSION  Recitation, Yolis, Rhythm                       Automatized Sequences  Standard Form    Recitation: 0  Percentiles: 30%tile  Yolis: 1  Percentiles: 30%tile  Rhythm: 1  Percentiles: 60%tile     Raw Score: 0/8  Percentiles: 0%tile     Recitation: 0  Percentiles: 30%tile  Yolis: 2  Percentiles: 100%tile  Rhythm: 1  Percentiles: 60%tile     Short Form  Raw Score: 2/4  Percentiles: 20%tile    Recitation: 0  Percentiles: 30%tile  Yolis: 2  Percentiles: 100%tile  Rhythm: 2  Percentiles: 60%tile     Short Form  Raw Score: 2/4  Percentiles: 20%tile    REPETITION  Words  Standard Form     Repetition of Nonsense  words     Sentences  Standard Form    Raw Score: 0  Percentiles:0 %tile     Raw Score:  Percentiles:      Raw Score:  Percentiles:     Raw Score: 3/10  Percentiles:10 %tile                       Raw Score: 5/10  Percentiles:10-20 %tile  Artic errors noted but syllable and vowel accuracy for approximation   NAMING  Responsive Naming  Standard Form     Morganza Naming Test  Standard Form    Raw Score:   Percentiles:         Short Form   Raw Score: 0/5  Percentiles:10% tile             (based on syllable and phoneme approximations)     Raw Score: 4  Stim cue: 0/0  PC: 2/11  MC: 9/10  Percentiles: 30  Mean: 13.9, +/-3    READING  Match case and scripts Raw Score: 7  Percentiles: 40%tile Raw Score: 8  Percentiles: 100%tile       Number matching Raw Score:   Percentiles:  Raw Score: 8/12  Percentiles: 10   Raw Score: 9/12  Percentiles: 10-20%    Picture-Word Matching Raw Score:   Percentiles: Raw Score: 7/10  Percentiles: 20%tile Raw Score: 9/10  Percentiles: 60%tile Raw Score: 10/10  Percentiles: 100%tile     Oral Word Reading Raw Score:   Percentiles:  Raw Score:   Percentiles:  Raw Score:   Percentiles:  Raw Score: 0/15  Percentiles: 0  Improved production with reading cue and DM      Oral Sentence Reading     Oral Sentence Comp     Sentence/Paragraph     Comprehension    Raw Score:   Percentiles:   Raw Score:   Percentiles:   Raw Score:   Percentiles:   Raw Score:   Percentiles:                                         WRITING  Form     Letter Choice     Motor Facility     Primer Words     Regular Phonics     Common Irregular Words     Written Picture Naming     Narrative Writing    Raw Score:   Percentiles:   Raw Score:   Percentiles:   Raw Score:   Percentiles:   Raw Score:   Percentiles:   Raw Score:   Percentiles:   Raw Score:   Percentiles:   Raw Score:   Percentiles:   Raw Score:   Percentiles:              IE: 8/16/2023 RE: 11/14/2023 RE: 5/14/2024   Rating Scale Profile of Speech Characteristics 1-7 scale 1-7  scale 1-7 scale   Articulatory Agility-phoneme and syllable level 1/7 2-3/7 3/7   Phrase Length: longest word runs 2/7 3/7 4-5/7   Grammatical Form: Variety and use of morphemes 2/7 3/7 4/7   Melodic Line (Prosody) 1/7 6-7/7 6-7/7   Paraphasia in Running Speech 1/7 2/7 2/7   Word Finding Relative to Fluency 1/7 4/7 4/7   Sentence Repetition 1/7 1/7 1/7   Auditory Comprehension 1/7 2-3/7 3-4/7         Aphasia Severity Rating: 3/5-     3- The patient can discuss almost all everyday problems with little or no assistance.  Reduction of speech and/or comprehension, however, makes conversation about certain material difficult or impossible.

## 2024-05-14 NOTE — PROGRESS NOTES
Occupational Therapy Visit    OT DAILY NOTE FOR OUTPATIENT THERAPY    Patient: Jennifer Sams   MRN: [id number]  : 1945 79 y.o.  Referring Physician: Ney Aparicio,*  Date of Visit: 2024      Certification Dates: 24 through 24    Diagnosis:   1. Ischemic stroke (CMS/HCC)    2. Lack of coordination        Chief Complaints:   Brain Injury    Precautions:  Existing Precautions/Restrictions: cardiac  Precautions comments: Pacemaker    TODAY'S VISIT    Time In Session:  Start Time: 1705 (Patient received late from speech therapy)  Stop Time: 1800  Time Calculation (min): 55 min   History/Vitals/Pain/Encounter Info - 24 1707          Injury History/Precautions/Daily Required Info    Document Type daily treatment     Primary Therapist Barry Rosas OTR/L     Chief Complaint/Reason for Visit  Brain Injury     Onset of Illness/Injury or Date of Surgery 23     Referring Physician Dr. Ney Aparicio     Existing Precautions/Restrictions cardiac     Precautions comments Pacemaker     History of present illness/functional impairment The patient is a 78-year-old  female with a history of AAA, heart block s/p cardiac pacemaker, glaucoma, hyperlipidemia, coronary artery disease, NSTEMI, who presented to [facility] on  after she has not been seen by her friends for a few days.  Patient lives alone and when she was found by EMS she was confused and combative.  In the emergency department she was aphasic with right-sided weakness and hypertensive to the 190 systolic.  CT scan of the head revealed an acute infarct in left MCA with mild bleeding.  Echo showed an EF of 43% and a bubble study was negative.  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  Later this was transitioned to apixaban.  She was continued on statin for secondary prevention.  She required virtual shivani while in  the hospital but this was not effective.  She was evaluated by speech therapy and cleared for a soft and bite-size diet with moderately thick liquids.'     Patient/Family/Caregiver Comments/Observations Patient arrives accompanied by caregiver Thelma who brings in adaptive kitchen tools     Patient reported fall since last visit No        Pain Assessment    Currently in pain Yes     Preferred Pain Scale number (Numeric Rating Pain Scale)     Pain Rating (0-10): Pre Activity 7     Pain Management Interventions massage provided        Pain Interventions    Intervention  light compression provided, monitored     Post Intervention Comments stable with intervention         Services    Do You Speak a Language Other Than English at Home? no                    Daily Treatment Assessment and Plan - 05/14/24 1917          Daily Treatment Assessment and Plan    Progress toward goals Progressing     Daily Outcome Summary Patient seen in OT with focus on adaptive meal prep with use of tools brought in by caregiver Thelma. Patient educated on safe and proper use of items and demonstrates good understanding; she demonstrates able to use adaptive pop top can opener with left hand and with plan to trial at home for mod I meal prep. Therapist collaborated with patient and caregiver to create plan for meal prep, and caregiver to supervise at home to facilitate carry over. She wore left hand-based thumb spica splint throughout session with good tolerance and appeared to benefit from wear for improved pinch grasp during fine motor task.     Plan and Recommendations F/U on bed positioning. Use splint for right hand fine motor control. Plan out simple meal for improved independence with meal prep                         OBJECTIVE DATA TAKEN TODAY    None Taken    Today's Treatment:    OT NEURO FLOW SHEET    EXERCISES CURRENT SESSION TIME   NEURO RE-ED  CPT 95733 TOTAL TIME FOR SESSION 8-22 Minutes   AAROM/AROM Patient performed  Emiliman's exercise at right shoulder for gentle stretching    Strengthening      Strength     Gross Motor Control     Fine Motor Control Patient wore hand-based thumb spica for the following fine motor control tasks:  - grasp and hold pen  - grasp and hold spoon (with and without universal cuff)  - remove metal pegs from pegboard    Sitting Jennifer/Balance     Standing Jennifer/Balance      Retraining     THERE ACT  CPT 94299 TOTAL TIME FOR SESSION 0-7 Minutes   Assessment     Pain, Vitals, Meds, Etc. Pain assessed and monitored; gentle compression provided at right shoulder by therapist for pain management, and with good tolerance    HEP     Functional Mobility     Transfers     THERE EX  CPT 46247 TOTAL TIME FOR SESSION Not performed   PROM     Activity Tolerance     SELF-CARE  CPT 03085 TOTAL TIME FOR SESSION 23-37 Minutes   Pt Education/Safety     ADL Training     IADL Training Patient participated in adaptive meal prep activity with use of tools including adaptive jar opener, universal cuff and pop top lid opener. Patient instructed on technique for use of items and demonstrates good understanding.    MODALITIES  CPT 70922 TOTAL TIME FOR SESSION Not performed   Ice/Heat     ATTENDED E-STIM  CPT 32103 TOTAL TIME FOR SESSION Not performed   Attended E-Stim     SPLINTING  CPT 14620 TOTAL TIME FOR SESSION Not performed   Fabrication/Check Out     Fit     Training     GROUP  CPT 90198 TOTAL TIME FOR SESSION Not performed

## 2024-05-14 NOTE — LETTER
Dear DR. Aparicio,    Thank you for this referral. Please review the attached notes and plan of care for your approval.  Please contact our department with any questions.     Sincerely,     Magnolia Lundy, CCC-SLP  [address]  [address]  [facility] [address]  Fax  [phone]    By co-signing this Plan of Care (POC) you agree to the following:  I have reviewed the the Plan of Care established by the therapist within this document and certify that the services are skilled and medically necessary. I have reviewed the plan and recommend that these services continue to meet the goals stated in this document.    PHYSICIAN SIGNATURE: __________________________________     DATE: ___________________  TIME: _____________      The following plan is in draft form.  Please refer to the current version for the most up-to-date information.                Speech Therapy Plan of Care 24   Effective from: 2024  Effective to: 2024    Draft  Plan ID: [id number]               Participants as of 5/15/2024      Name Type Comments Contact Info    Ney Aparicio MD Referring Provider  [phone]    Magnolia Lundy CCC-SLP Speech Language Pathologist            Last Progress Notes Note       Author: Magnolia Lundy CCC-SLP Status: Signed Last edited: 2024  4:00 PM         Speech-Language Pathology Progress Note    [facility] Fax: [phone]    SLP RE-EVALUATION FOR OUTPATIENT THERAPY    Patient: Jennifer Sams   MRN: [id number]  : 1945 79 y.o.    Referring Physician: Ney Aparicio,*  Date of Visit: 2024    New Certification Dates: 24 through 24    Recommended Frequency & Duration:  2 times/week for up to 3 months   PN: 2024    Diagnosis:   1. Cerebrovascular accident (CVA), unspecified mechanism (CMS/HCC)    2. Acute ischemic left MCA stroke (CMS/HCC)    3. Cardioembolic stroke (CMS/HCC)        Chief Complaints:   Chief Complaint  "  Patient presents with   • Speech Problem     \"I can't say anything anymore \"       Precautions:   Precautions Comments: aphasia, apraxia, pacemaker    TODAY'S VISIT:    Session Time:  Start Time: 1605 (pt arrived)  Stop Time: 1702  Time Calculation (min): 57 min   General Information - 05/14/24 1605          Session Details    Document Type re-evaluation     Mode of Treatment individual therapy        General Information    Onset of Illness/Injury or Date of Surgery 06/26/23     Referring Physician Dr. Aparicio     Precautions Comments aphasia, apraxia, pacemaker     Limitations/Impairments other (see comments)     Patient/Family/Caregiver Comments/Observations Pt reports poor sleep due to arm, estimating 2 hours of sleep.  Thelma reports some improved eating.  Pt reported independently knocking on neighbors door to visit their dogDank.  Encouraged sleep, eating healthy, socialization and exercise.                    Daily Falls Screen - 05/14/24 1605          Daily Falls Assessment    Patient reported fall since last visit No                    Pain and Vitals - 05/14/24 1605          Pain Assessment    Currently in pain Yes     Preferred Pain Scale number (Numeric Rating Pain Scale)     Pain Rating (0-10): Pre Activity 9     Pain Rating (0-10): Activity 9     Pain Rating (0-10): Post Activity 9        Pain Interventions    Intervention encouraged not to elevate it- which results in pain.     Post Intervention Comments none                    SLP - 05/14/24 1605          Speech Therapy    Speech Therapy Specialty Traditional Neuro Program ST        SLP Frequency and Duration    Frequency of treatment 2 times/week     Speech Duration 3 months     SLP Custom Frequency and Duration PN: 6/14/2024     SLP Cert From 05/14/24     SLP Cert To 07/09/24     Date SLP POC was sent to provider 05/14/24     Signed SLP Plan of Care received?  Yes                    Assessment - 05/14/24 2000          Assessment    Plan of " Care reviewed and patient/family in agreement Yes     Comments Pt and POA agreed to additional weeks of therapy to set up home program and discharge planning.     Rehab Potential/Prognosis (SLP) adequate, monitor progress closely     Problem List Impaired communication     Clinical Assessment Ms. Sams presents for re-evaluation of present levels of communication this date following 47 visits since 8/16/2023.  Ms. Sams exhibits improved motor speech, expressive and receptive language skills over those visits improving communication so that pt can discuss almost all everyday problems with some assistance, however moderate motor speech deficits and moderate expressive and receptive language deficits make conversations about certain material difficult.  Ms. Sams will continue to benefit from speech and language therapy to continue to target motor speech, expressive and receptive language while setting up a home program to support stimulation and practice within personal environment.     Plan and Recommendations Practice address, 2 step directions including body parts, letters, numbers, syllable and vowel integrity in word approximations provided a direct model     [employer] [facility] [address] Speech Lang Voice Comm and/or Auditory Proc (Treatment of speech, language, voice, communication and/or hearing processing disorder)                     OBJECTIVE MEASUREMENTS/DATA:    OM: FCM/Voice/Neuro    Outcome Measures - 05/14/24 2132          Functional Communication Measures    FCM: Motor Speech 4-->Level 4   approaching 5    FCM: Reading 5-->Level 5     FCM: Spoken Language, Comprehension 5-->Level 5     FCM: Spoken Language, Expression 4-->Level 4                     Outcome Measures          12/14/2023    17:09 2/8/2024    17:02 3/12/2024    20:12 5/14/2024    21:32   SLP Outcome Measures   FCM: Motor Speech 4-->Level 4 4-->Level 4 4-->Level 4 4-->Level 4       approaching 5   FCM: Reading  4-->Level 4 4-->Level 4  5-->Level 5   FCM: Spoken Language, Comprehension 5-->Level 5 5-->Level 5 5-->Level 5 5-->Level 5   FCM: Spoken Language, Expression 4-->Level 4 4-->Level 4 4-->Level 4 4-->Level 4       Today's Treatment:    BDAE  Eval: 8/18/2023     RE: 11/14/2023    RE: 2/8/2024 RE: 5/14/2024   Aphasia Severity Rating Scale 1/5  2/5 2/5 3/5   CONVERSATIONAL and EXPOSITORY SPEECH          Simple Social Responses Raw Score:   Percentiles:  Raw Score: 6/7  Percentiles: 50%tile Raw Score: 6/7  Percentiles: 50%tile  Missing street number only Raw Score: 6/7  Percentiles: 50%tile  Missing street number only   Complexity Index  Raw Score:    Ratio:   Percentiles:         Narrative Discourse  Short form Raw Score:   Ratio:   Percentiles:         AUDITORY COMPREHEN         Basic Word Discrimination             Raw Score: 29/37  Percentiles:20%             Raw Score: 33/37  Percentiles:40-50% Body:  shoulder, letters: T,N   #: 257     Raw Score: 33/37  Percentiles:40-50% Body:  shoulder, letters: T,N   #: 257     Raw Score: 33/37  Percentiles:40-50%   Commands  Standard Form  8/25/2023 Raw Score: 3/15  Percentiles: 0-10% Raw Score: 7/15  Percentiles: 10-20% Raw Score: 10/15  Percentiles: 20% Raw Score: 7/15  Percentiles: 10-20%      Complex Ideational Material  Short Form  8/25/2023    Raw Score: 0/4  Percentiles: 0%  Short Form  8/25/2023     Raw Score: 5/6  Percentiles: 70%  Short Form       Raw Score: 7/12  Percentiles: 40%  Long form   Raw Score: 4/6  Percentiles: 50%  Short Form   ARTICULATION  Agility                   ORAL EXPRESSION  Recitation, Yolis, Rhythm                       Automatized Sequences  Standard Form    Recitation: 0  Percentiles: 30%tile  Yolis: 1  Percentiles: 30%tile  Rhythm: 1  Percentiles: 60%tile     Raw Score: 0/8  Percentiles: 0%tile     Recitation: 0  Percentiles: 30%tile  Yolis: 2  Percentiles: 100%tile  Rhythm: 1  Percentiles: 60%tile     Short Form  Raw Score: 2/4  Percentiles: 20%tile    Recitation:  0  Percentiles: 30%tile  Yolis: 2  Percentiles: 100%tile  Rhythm: 2  Percentiles: 60%tile     Short Form  Raw Score: 2/4  Percentiles: 20%tile    REPETITION  Words  Standard Form     Repetition of Nonsense words     Sentences  Standard Form    Raw Score: 0  Percentiles:0 %tile     Raw Score:  Percentiles:      Raw Score:  Percentiles:     Raw Score: 3/10  Percentiles:10 %tile                       Raw Score: 5/10  Percentiles:10-20 %tile  Artic errors noted but syllable and vowel accuracy for approximation   NAMING  Responsive Naming  Standard Form     [employer] Naming Test  Standard Form    Raw Score:   Percentiles:         Short Form   Raw Score: 0/5  Percentiles:10% tile             (based on syllable and phoneme approximations)     Raw Score: 4  Stim cue: 0/0  PC: 2/11  MC: 9/10  Percentiles: 30  Mean: 13.9, +/-3    READING  Match case and scripts Raw Score: 7  Percentiles: 40%tile Raw Score: 8  Percentiles: 100%tile       Number matching Raw Score:   Percentiles:  Raw Score: 8/12  Percentiles: 10   Raw Score: 9/12  Percentiles: 10-20%    Picture-Word Matching Raw Score:   Percentiles: Raw Score: 7/10  Percentiles: 20%tile Raw Score: 9/10  Percentiles: 60%tile Raw Score: 10/10  Percentiles: 100%tile     Oral Word Reading Raw Score:   Percentiles:  Raw Score:   Percentiles:  Raw Score:   Percentiles:  Raw Score: 0/15  Percentiles: 0  Improved production with reading cue and DM      Oral Sentence Reading     Oral Sentence Comp     Sentence/Paragraph     Comprehension    Raw Score:   Percentiles:   Raw Score:   Percentiles:   Raw Score:   Percentiles:   Raw Score:   Percentiles:                                         WRITING  Form     Letter Choice     Motor Facility     Primer Words     Regular Phonics     Common Irregular Words     Written Picture Naming     Narrative Writing    Raw Score:   Percentiles:   Raw Score:   Percentiles:   Raw Score:   Percentiles:   Raw Score:   Percentiles:   Raw Score:   Percentiles:    Raw Score:   Percentiles:   Raw Score:   Percentiles:   Raw Score:   Percentiles:              IE: 2023 RE: 2023 RE: 2024   Rating Scale Profile of Speech Characteristics 1-7 scale 1-7 scale 1-7 scale   Articulatory Agility-phoneme and syllable level -3/7 3   Phrase Length: longest word runs 2/7 3/7 4-   Grammatical Form: Variety and use of morphemes 2/7 3/7 4/7   Melodic Line (Prosody) --   Paraphasia in Running Speech    Word Finding Relative to Fluency    Sentence Repetition    Auditory Comprehension -3/7 3-         Aphasia Severity Rating: 3/5-     3- The patient can discuss almost all everyday problems with little or no assistance.  Reduction of speech and/or comprehension, however, makes conversation about certain material difficult or impossible.             Goals:     Goals Addressed                   This Visit's Progress    • SLP Motor Goals          Goals Short-Long Time Frame Result Comment   MOTOR SPEECH  Current:4  Goal:5-6  SPOKEN LANG COMP  Current:5  Goal:  6  SPOKEN LANG EXP:  Current:4   Goal: 5  READING:   Current:4  Goal: 5 LTG 12w  Not Met    MOTOR SPEECH: 4  SPOKEN LANG COMP: 5  SPOKEN LANG EXP:4  READIN   Pt will improve Aphasia Severity Rating Scale from 2/5 to >/=3/5 LTG 12 w  Met  Aphasia Severity Rating: 3/5-          MOTOR SPEECH  Pt will produce 1-3 syllable words   with /p,b,m,w,t,d,n,s,l/ in initial position 80% of presentations.     Revise:  Pt will produced /p,b,m,w,t,d,n,s,l/, in isolation and single syllables provided visual cue, 80%    Pt will repeat an intelligible estimate to a familiar listener of multisyllabic words producing accurate syllable and vowel integrity, 80% of presentations.     STG 6 w RE: 2024  Not Met Pt can produce all target phonemes in isolation, initial syllables with direct model and VC.       Pt will repeat familiar greetings and common exchanges provided a direct  "model, 80% of presentations.     Revise: pt will state address: number, street and town, fading cues.     STG 6 w  RE: 5/14/2024 Dialogue boxes: single words 15/17- 88% , + one sentence \" I don't want it.\"  Increase phrase length   SPOKEN LANG COMP  Pt will demonstrate understanding of complex sentences and short paragraphs with 80% accuracy to y/n questions.. STG 4 w  MET PN: 3/12/2024  Full length of paragraph: 80%     Consider increased accuracy to 90% and/or increased length of paragraph    Pt will follow 2 step commands with 4 components, 80% of presentations.      New: follow 2 step directions identifying body parts, letters and numbers in 10's and 100's place value, 80% of presentations. STG 6 w  RE: 5/14/2024  Not met     2 step directions  2-step:   objects 100%  Adjectives 40%  First/then: 1/3 33%  Adjectives 0/3, 0%  2 adjectives 2/3, 66%  Before/after- in order: 2/3 66%  Before/after- reverse order 2/3 66%  Before/after + Adjectives: 1/3, 33%  Before/after + 2 Adjectives: 1/2, 50%  Before/after +3 Adjectives:; 0/2, 0%   SPOKEN LANG EXP  Provided a descriptive cue and a F6 pictures written words, pt will name the described item, 80% of trials, min A  STG 4 w  PN: 4/11/2024  MET 6/6 , 6/6, 6/6,  7/7, with word approximation 1-5 syllables.   Given simple picture cue pt will produce simple sentences of 3-4 words with simplified grammatical form and approximated content words to convey meaning, Min A, 80% of presentations.  STG 6 w  RE: 5/14/2024  Not Met  Mod A with use of word berg and sentence unscrambling    ORAL READING:  NEW  Pt will demonstrate oral reading approximations of 1-3 syllable words, 80% of presentations.        READING  Pt will match picture to word F4 90% of presentations.    New: Read phrase completion with F4 MC, 80%    STG 4 w RE: 5/14/2024  Not Met      3/12/2024  MET, revise to picture to phrase  67-70%       Pt will select picture/word for sentence completion, 80%    STG 6w  RE: " 5/14/2024  Met  Reading Sentence completion, F2:  80%   Pt will demonstrate reading comprehension of sentences to brief paragraph- answering multiple choice questions, 80% min A STG 8 w RE: 5/14/2024  Not Met  Reading Comp of sentences,  F3-8/10, min to mod A   Aphasia Severity Score       3- The patient can discuss almost all everyday problems with little or no assistance.  Reduction of speech and/or comprehension, however, makes conversation about certain material difficult or impossible.        FCM MOTOR SPEECH  L4: In simple structured conversation with familiar communication partners, the individual can produce simple words and phrases intelligibly.  The individual usually requires moderate cueing in order to produce simple sentences intelligibly, although accuracy may vary.         FCM: Spoken Language Expression   L4: The individual is successfully able to initiate communication using spoken language in simple, structured conversation in routine daily activities with familiar communication partners.  The individual usually requires moderate cueing, but is able to demonstrate use of simple sentences (ie, semantics, syntax, and morphology) and rarely uses complex sentences/messages.        FCM: Spoken Language Comprehension  L5: The individual is able to understand communication in structured conversations with both familiar and unfamiliar communication partners.  The individual occasionally requires minimal cueing to understand more complex sentences/messages.  The individual occasionally initiates the use of compensatory strategies when encountering difficulty.       FCM: READING  L5: The individual reads sentence-level material containing some complex words.  The individual occasionally requires minimal cueing to read more complex sentences and paragraph-level material.  The individual occasionally uses compensatory strategies.                                        Magnolia Lundy, CCC-SLP

## 2024-05-16 ENCOUNTER — TELEPHONE (OUTPATIENT)
Dept: OCCUPATIONAL THERAPY | Age: 79
End: 2024-05-16
Payer: MEDICARE

## 2024-05-16 NOTE — PROGRESS NOTES
"Speech-Language Pathology Progress Note    [facility] Fax: [phone]    SLP RE-EVALUATION FOR OUTPATIENT THERAPY    Patient: Jennifer Sams   MRN: [id number]  : 1945 79 y.o.    Referring Physician: Ney Aparicio,*  Date of Visit: 2024    New Certification Dates: 24 through 24    Recommended Frequency & Duration:  2 times/week for up to 3 months   PN: 2024    Diagnosis:   1. Cerebrovascular accident (CVA), unspecified mechanism (CMS/HCC)    2. Acute ischemic left MCA stroke (CMS/HCC)    3. Cardioembolic stroke (CMS/HCC)        Chief Complaints:   Chief Complaint   Patient presents with    Speech Problem     \"I can't say anything anymore \"       Precautions:   Precautions Comments: aphasia, apraxia, pacemaker    TODAY'S VISIT:    Session Time:  Start Time: 1605 (pt arrived)  Stop Time: 1702  Time Calculation (min): 57 min   General Information - 24 1605          Session Details    Document Type re-evaluation     Mode of Treatment individual therapy        General Information    Onset of Illness/Injury or Date of Surgery 23     Referring Physician Dr. Aparicio     Precautions Comments aphasia, apraxia, pacemaker     Limitations/Impairments other (see comments)     Patient/Family/Caregiver Comments/Observations Pt reports poor sleep due to arm, estimating 2 hours of sleep.  Thelma reports some improved eating.  Pt reported independently knocking on neighbors door to visit their dog, Dank.  Encouraged sleep, eating healthy, socialization and exercise.                    Daily Falls Screen - 24 1605          Daily Falls Assessment    Patient reported fall since last visit No                    Pain and Vitals - 24 1605          Pain Assessment    Currently in pain Yes     Preferred Pain Scale number (Numeric Rating Pain Scale)     Pain Rating (0-10): Pre Activity 9     Pain Rating (0-10): Activity 9     Pain Rating (0-10): Post Activity 9        " Pain Interventions    Intervention encouraged not to elevate it- which results in pain.     Post Intervention Comments none                    SLP - 05/14/24 1605          Speech Therapy    Speech Therapy Specialty Traditional Neuro Program [facility]        SLP Frequency and Duration    Frequency of treatment 2 times/week     Speech Duration 3 months     SLP Custom Frequency and Duration PN: 6/14/2024     SLP Cert From 05/14/24     SLP Cert To 07/09/24     Date SLP POC was sent to provider 05/14/24     Signed SLP Plan of Care received?  Yes                    Assessment - 05/14/24 2000          Assessment    Plan of Care reviewed and patient/family in agreement Yes     Comments Pt and POA agreed to additional weeks of therapy to set up home program and discharge planning.     Rehab Potential/Prognosis (SLP) adequate, monitor progress closely     Problem List Impaired communication     Clinical Assessment Ms. Sams presents for re-evaluation of present levels of communication this date following 47 visits since 8/16/2023.  Ms. Sams exhibits improved motor speech, expressive and receptive language skills over those visits improving communication so that pt can discuss almost all everyday problems with some assistance, however moderate motor speech deficits and moderate expressive and receptive language deficits make conversations about certain material difficult.  Ms. Sams will continue to benefit from speech and language therapy to continue to target motor speech, expressive and receptive language while setting up a home program to support stimulation and practice within personal environment.     Plan and Recommendations Practice address, 2 step directions including body parts, letters, numbers, syllable and vowel integrity in word approximations provided a direct model     [employer] CPT 79023 Speech Lang Voice Comm and/or Auditory Proc (Treatment of speech, language, voice, communication and/or hearing processing  disorder)                     OBJECTIVE MEASUREMENTS/DATA:    OM: FCM/Voice/Neuro    Outcome Measures - 05/14/24 2132          Functional Communication Measures    FCM: Motor Speech 4-->Level 4   approaching 5    FCM: Reading 5-->Level 5     FCM: Spoken Language, Comprehension 5-->Level 5     FCM: Spoken Language, Expression 4-->Level 4                     Outcome Measures          12/14/2023    17:09 2/8/2024    17:02 3/12/2024    20:12 5/14/2024    21:32   SLP Outcome Measures   FCM: Motor Speech 4-->Level 4 4-->Level 4 4-->Level 4 4-->Level 4       approaching 5   FCM: Reading  4-->Level 4 4-->Level 4 5-->Level 5   FCM: Spoken Language, Comprehension 5-->Level 5 5-->Level 5 5-->Level 5 5-->Level 5   FCM: Spoken Language, Expression 4-->Level 4 4-->Level 4 4-->Level 4 4-->Level 4       Today's Treatment:    BDAE  Eval: 8/18/2023     RE: 11/14/2023    RE: 2/8/2024 RE: 5/14/2024   Aphasia Severity Rating Scale 1/5  2/5 2/5 3/5   CONVERSATIONAL and EXPOSITORY SPEECH          Simple Social Responses Raw Score:   Percentiles:  Raw Score: 6/7  Percentiles: 50%tile Raw Score: 6/7  Percentiles: 50%tile  Missing street number only Raw Score: 6/7  Percentiles: 50%tile  Missing street number only   Complexity Index  Raw Score:    Ratio:   Percentiles:         Narrative Discourse  Short form Raw Score:   Ratio:   Percentiles:         AUDITORY COMPREHEN         Basic Word Discrimination             Raw Score: 29/37  Percentiles:20%             Raw Score: 33/37  Percentiles:40-50% Body:  shoulder, letters: T,N   #: 257     Raw Score: 33/37  Percentiles:40-50% Body:  shoulder, letters: T,N   #: 257     Raw Score: 33/37  Percentiles:40-50%   Commands  Standard Form  8/25/2023 Raw Score: 3/15  Percentiles: 0-10% Raw Score: 7/15  Percentiles: 10-20% Raw Score: 10/15  Percentiles: 20% Raw Score: 7/15  Percentiles: 10-20%      Complex Ideational Material  Short Form  8/25/2023    Raw Score: 0/4  Percentiles: 0%  Short Form  8/25/2023      Raw Score: 5/6  Percentiles: 70%  Short Form       Raw Score: 7/12  Percentiles: 40%  Long form   Raw Score: 4/6  Percentiles: 50%  Short Form   ARTICULATION  Agility                   ORAL EXPRESSION  Recitation, Yolis, Rhythm                       Automatized Sequences  Standard Form    Recitation: 0  Percentiles: 30%tile  Yolis: 1  Percentiles: 30%tile  Rhythm: 1  Percentiles: 60%tile     Raw Score: 0/8  Percentiles: 0%tile     Recitation: 0  Percentiles: 30%tile  Yolis: 2  Percentiles: 100%tile  Rhythm: 1  Percentiles: 60%tile     Short Form  Raw Score: 2/4  Percentiles: 20%tile    Recitation: 0  Percentiles: 30%tile  Yolis: 2  Percentiles: 100%tile  Rhythm: 2  Percentiles: 60%tile     Short Form  Raw Score: 2/4  Percentiles: 20%tile    REPETITION  Words  Standard Form     Repetition of Nonsense words     Sentences  Standard Form    Raw Score: 0  Percentiles:0 %tile     Raw Score:  Percentiles:      Raw Score:  Percentiles:     Raw Score: 3/10  Percentiles:10 %tile                       Raw Score: 5/10  Percentiles:10-20 %tile  Artic errors noted but syllable and vowel accuracy for approximation   NAMING  Responsive Naming  Standard Form     [place] Naming Test  Standard Form    Raw Score:   Percentiles:         Short Form   Raw Score: 0/5  Percentiles:10% tile             (based on syllable and phoneme approximations)     Raw Score: 4  Stim cue: 0/0  PC: 2/11  MC: 9/10  Percentiles: 30  Mean: 13.9, +/-3    READING  Match case and scripts Raw Score: 7  Percentiles: 40%tile Raw Score: 8  Percentiles: 100%tile       Number matching Raw Score:   Percentiles:  Raw Score: 8/12  Percentiles: 10   Raw Score: 9/12  Percentiles: 10-20%    Picture-Word Matching Raw Score:   Percentiles: Raw Score: 7/10  Percentiles: 20%tile Raw Score: 9/10  Percentiles: 60%tile Raw Score: 10/10  Percentiles: 100%tile     Oral Word Reading Raw Score:   Percentiles:  Raw Score:   Percentiles:  Raw Score:   Percentiles:  Raw Score:  0/15  Percentiles: 0  Improved production with reading cue and DM      Oral Sentence Reading     Oral Sentence Comp     Sentence/Paragraph     Comprehension    Raw Score:   Percentiles:   Raw Score:   Percentiles:   Raw Score:   Percentiles:   Raw Score:   Percentiles:                                         WRITING  Form     Letter Choice     Motor Facility     Primer Words     Regular Phonics     Common Irregular Words     Written Picture Naming     Narrative Writing    Raw Score:   Percentiles:   Raw Score:   Percentiles:   Raw Score:   Percentiles:   Raw Score:   Percentiles:   Raw Score:   Percentiles:   Raw Score:   Percentiles:   Raw Score:   Percentiles:   Raw Score:   Percentiles:              IE: 2023 RE: 2023 RE: 2024   Rating Scale Profile of Speech Characteristics 1-7 scale 1-7 scale 1-7 scale   Articulatory Agility-phoneme and syllable level  2-3/7 3   Phrase Length: longest word runs 2/7 3/7 4-   Grammatical Form: Variety and use of morphemes 2/7 3/7 4/7   Melodic Line (Prosody) --   Paraphasia in Running Speech    Word Finding Relative to Fluency  4   Sentence Repetition    Auditory Comprehension  2-3/7 3-         Aphasia Severity Rating: 3/5-     3- The patient can discuss almost all everyday problems with little or no assistance.  Reduction of speech and/or comprehension, however, makes conversation about certain material difficult or impossible.             Goals:     Goals Addressed                   This Visit's Progress     SLP Motor Goals          Goals Short-Long Time Frame Result Comment   MOTOR SPEECH  Current:4  Goal:5-6  SPOKEN LANG COMP  Current:5  Goal:  6  SPOKEN LANG EXP:  Current:4   Goal: 5  READING:   Current:4  Goal: 5 LTG 12w  Not Met    MOTOR SPEECH: 4  SPOKEN LANG COMP: 5  SPOKEN LANG EXP:4  READIN   Pt will improve Aphasia Severity Rating Scale from 2/5 to >/=3/5 LTG 12 w  Met  Aphasia Severity  "Rating: 3/5-          MOTOR SPEECH  Pt will produce 1-3 syllable words   with /p,b,m,w,t,d,n,s,l/ in initial position 80% of presentations.     Revise:  Pt will produced /p,b,m,w,t,d,n,s,l/, in isolation and single syllables provided visual cue, 80%    Pt will repeat an intelligible estimate to a familiar listener of multisyllabic words producing accurate syllable and vowel integrity, 80% of presentations.     STG 6 w RE: 5/14/2024  Not Met Pt can produce all target phonemes in isolation, initial syllables with direct model and VC.       Pt will repeat familiar greetings and common exchanges provided a direct model, 80% of presentations.     Revise: pt will state address: number, street and town, fading cues.     STG 6 w  RE: 5/14/2024 Dialogue boxes: single words 15/17- 88% , + one sentence \" I don't want it.\"  Increase phrase length   SPOKEN LANG COMP  Pt will demonstrate understanding of complex sentences and short paragraphs with 80% accuracy to y/n questions.. STG 4 w  MET PN: 3/12/2024  Full length of paragraph: 80%     Consider increased accuracy to 90% and/or increased length of paragraph    Pt will follow 2 step commands with 4 components, 80% of presentations.      New: follow 2 step directions identifying body parts, letters and numbers in 10's and 100's place value, 80% of presentations. STG 6 w  RE: 5/14/2024  Not met     2 step directions  2-step:   objects 100%  Adjectives 40%  First/then: 1/3 33%  Adjectives 0/3, 0%  2 adjectives 2/3, 66%  Before/after- in order: 2/3 66%  Before/after- reverse order 2/3 66%  Before/after + Adjectives: 1/3, 33%  Before/after + 2 Adjectives: 1/2, 50%  Before/after +3 Adjectives:; 0/2, 0%   SPOKEN LANG EXP  Provided a descriptive cue and a F6 pictures written words, pt will name the described item, 80% of trials, min A  STG 4 w  PN: 4/11/2024  MET 6/6 , 6/6, 6/6,  7/7, with word approximation 1-5 syllables.   Given simple picture cue pt will produce simple sentences of " 3-4 words with simplified grammatical form and approximated content words to convey meaning, Min A, 80% of presentations.  STG 6 w  RE: 5/14/2024  Not Met  Mod A with use of word berg and sentence unscrambling    ORAL READING:  NEW  Pt will demonstrate oral reading approximations of 1-3 syllable words, 80% of presentations.        READING  Pt will match picture to word F4 90% of presentations.    New: Read phrase completion with F4 MC, 80%    STG 4 w RE: 5/14/2024  Not Met      3/12/2024  MET, revise to picture to phrase  67-70%       Pt will select picture/word for sentence completion, 80%    STG 6w  RE: 5/14/2024  Met  Reading Sentence completion, F2:  80%   Pt will demonstrate reading comprehension of sentences to brief paragraph- answering multiple choice questions, 80% min A STG 8 w RE: 5/14/2024  Not Met  Reading Comp of sentences, MC F3-8/10, min to mod A   Aphasia Severity Score       3- The patient can discuss almost all everyday problems with little or no assistance.  Reduction of speech and/or comprehension, however, makes conversation about certain material difficult or impossible.        FCM MOTOR SPEECH  L4: In simple structured conversation with familiar communication partners, the individual can produce simple words and phrases intelligibly.  The individual usually requires moderate cueing in order to produce simple sentences intelligibly, although accuracy may vary.         FCM: Spoken Language Expression   L4: The individual is successfully able to initiate communication using spoken language in simple, structured conversation in routine daily activities with familiar communication partners.  The individual usually requires moderate cueing, but is able to demonstrate use of simple sentences (ie, semantics, syntax, and morphology) and rarely uses complex sentences/messages.        FCM: Spoken Language Comprehension  L5: The individual is able to understand communication in structured conversations  with both familiar and unfamiliar communication partners.  The individual occasionally requires minimal cueing to understand more complex sentences/messages.  The individual occasionally initiates the use of compensatory strategies when encountering difficulty.       FCM: READING  L5: The individual reads sentence-level material containing some complex words.  The individual occasionally requires minimal cueing to read more complex sentences and paragraph-level material.  The individual occasionally uses compensatory strategies.                                        Magnolia Lundy, CCC-SLP

## 2024-05-20 ENCOUNTER — HOSPITAL ENCOUNTER (OUTPATIENT)
Dept: OCCUPATIONAL THERAPY | Age: 79
Setting detail: THERAPIES SERIES
Discharge: HOME | End: 2024-05-20
Attending: INTERNAL MEDICINE
Payer: MEDICARE

## 2024-05-20 DIAGNOSIS — I63.9 ISCHEMIC STROKE (CMS/HCC): Primary | ICD-10-CM

## 2024-05-20 DIAGNOSIS — R27.9 LACK OF COORDINATION: ICD-10-CM

## 2024-05-20 PROCEDURE — 97530 THERAPEUTIC ACTIVITIES: CPT | Mod: GO,KX

## 2024-05-20 PROCEDURE — 97112 NEUROMUSCULAR REEDUCATION: CPT | Mod: GO,KX

## 2024-05-21 NOTE — PROGRESS NOTES
Occupational Therapy Discharge      OT DISCHARGE NOTE FOR OUTPATIENT THERAPY    Patient: Jennifer Sams   MRN: [id number]  : 1945 79 y.o.  Referring Physician: Ney Aparicio,*  Date of Visit: 2024      Certification Dates:  24 through 24    Total Visit Count: 47    Diagnosis:   1. Ischemic stroke (CMS/HCC)    2. Lack of coordination        Chief Complaints:   No chief complaint on file.      Precautions:   Existing Precautions/Restrictions: cardiac  Precautions comments: Pacemaker    TODAY'S VISIT:    Time In Session:  Start Time: 1303  Stop Time: 1400  Time Calculation (min): 57 min   General Information - 24 1308          Session Details    Document Type discharge evaluation        General Information    Onset of Illness/Injury or Date of Surgery 23     Referring Physician Dr. Ney Aparicio     History of present illness/functional impairment The patient is a 78-year-old  female with a history of AAA, heart block s/p cardiac pacemaker, glaucoma, hyperlipidemia, coronary artery disease, NSTEMI, who presented to [facility] on  after she has not been seen by her friends for a few days.  Patient lives alone and when she was found by EMS she was confused and combative.  In the emergency department she was aphasic with right-sided weakness and hypertensive to the 190 systolic.  CT scan of the head revealed an acute infarct in left MCA with mild bleeding.  Echo showed an EF of 43% and a bubble study was negative.  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  Later this was transitioned to apixaban.  She was continued on statin for secondary prevention.  She required virtual shivani while in the hospital but this was not effective.  She was evaluated by speech therapy and cleared for a soft and bite-size diet with moderately thick liquids.'     Patient/Family/Caregiver  Comments/Observations Patient arrives with caregiver Thelma, she reports continued pain at right shoulder despite no longer opening windows forcefully. Plan is to see Orthopedist and get PT     Existing Precautions/Restrictions cardiac     Precautions comments Pacemaker         Services    Do You Speak a Language Other Than English at Home? no                      Pain/Vitals - 05/20/24 1308          Pain Assessment    Currently in pain Yes     Preferred Pain Scale number (Numeric Rating Pain Scale)     Pain Rating (0-10): Pre Activity 7   7/10       Pain Interventions    Intervention  monitored     Post Intervention Comments no reported change                    OT - 05/21/24 1027          Occupational Therapy Encounter Type Details    Occupational Therapy Specialty Traditional Neuro Program OT        OT Frequency and Duration    Frequency of treatment 2 times/week     OT Duration 8 weeks     OT Cert From 03/26/24     OT Cert To 05/21/24     Date OT POC was sent to provider 03/26/24     Signed OT Plan of Care received?  Yes                    Assessment - 05/21/24 1032          Assessment    Plan of Care reviewed and patient/family in agreement Yes     System Pathology/Pathophysiology Noted neuromuscular     Functional Limitations in Following Categories self-care;home management;community/leisure     Problem List: Occupational Therapy coordination impaired;impaired cognition;sensation decreased     Rehab Potential/Prognosis: Occupational Therapy good, to achieve stated therapy goals     Clinical Assessment Patient was seen in OP OT for improved functional independence s/p CVA and subsequent RUE hemiparesis, and discharge evaluation was initiated today. Patient completed initial evaluation on 8/16/2023 and has participated in 47 Occupational Therapy sessions, including this visit. Patient continues to live alone and receives support and care from friends who assist with transportation, grocery shopping,  and healthcare management; her friend and caregiver Thelma serves as her power of  and is present to assist in interview today due to patient's aphasia. Per last assessment patient continues to complete dressing, toileting and bathing with modified independence. She has increased participation in meal preparation in an effort to be more independent in this area however she is still limited in this due to RUE deficits. ROM at right shoulder is more limited today secondary to pain. Box and Block score at right hand is 23 blocks with use of hand splint and 18 blocks without use; previous measurement at right hand was 21 blocks on 4/23/24. Right hand  strength is increased slightly to 12 lb compared to 11 lbs at last testing, and patient demonstrates ability to make a full fist at right hand. HEP was thoroughly reviewed today and caregiver Thelma was issued hand splint for continued fine motor control exercise; patient and caregiver report good understanding with HEP. Results of evaluation indicate difficulty with performing ROM movements at right shoulder due to pain, which patient has consistently rated as 7/10 or higher, and which has contributed to recent cancelled OT appointments. In discussion with patient and caregiver, it appears that they are prioritizing pain management at shoulder and with plan to see Orthopedic physician and possibly to participate in Ortho PT. Patient appears limited in her ability to make further progress in OT due to this issue, as evidenced by decreased ROM measurement and plateaued dexterity score with HEP, and therapist recommends discharge to [facility] at this time. Patient and caregiver are in agreement. (P)      Plan and Recommendations Discharge to [facility] (P)                      OBJECTIVE MEASUREMENTS/DATA:    Range of Motion     PROM/AROM - 05/20/24 1300          RIGHT: Upper Extremity AROM Assessment    Shoulder Flexion   100 degrees   limited by pain                  BADL/IADL     BADL/IADL - 05/21/24 1536          BADL Interventions Assessment    Upper Body Dressing Modified independence   Assessed 4/23/24    Lower Body Dressing Modified independence   Assessed 4/23/24    Lower body dressing comments Patient now able to tie shoe laces 75-80%     Bathing Modified independence   Assessed 4/23/24    Bathing comments Now washing hair 100% of the time, using shower chair and grab bars     Toileting Independent   Assessed 4/23/24    Grooming Modified independence   Assessed 4/23/24    Grooming comments Usually automatic toothbrush. Able to brush hair but difficulty with blow drying hair     Eating Modified independence   Assessed 4/23/24    Eating comments Difficulty with cutting food        IADL/Home Interventions Assessment    Care of Others Minimum assist (75% patient effort)   Assessed 4/23/24    Care of Others comments Getting help with opening wet food     Financial management Dependent (less than 25% patient effort)   Assessed 4/23/24    Financial management comments Friend/POA manages finances.     Medication management Independent   Assessed 4/23/24    Medication management comments Able to sort medications into weekly pill sorter and takes.     Home management/maintenance Independent   Assessed 4/23/24    Home management/maintenance comments Laundry, light cleaning, washing dishes, organizing/straightening up     Meal prep / clean up Dependent (less than 25% patient effort)   Assessed 4/23/24                  Gross and Fine Motor     Gross and Fine Motor - 05/20/24 1310          Hand  Strength Testing    Left Hand, Setting 2 35/44/45; Average = 41.3 lbs     Right Hand, Setting 2 9/14/13; Average = 12 lbs   limited by pain                  Outcome Measures    Outcome Measures - 05/20/24 1310          Objective Outcome Measures    RIGHT hand: Box and Blocks Assessment 23 w/ splint, 18 w/o splint     LEFT hand: Box and Blocks Assessment 57                     ROM and MMT           1/4/2024    19:06 1/11/2024    19:18 1/18/2024    17:11 3/26/2024    14:00 4/23/2024    17:00 5/20/2024   OT UE ROM Measurements   AROM: Right Shoulder Flexion    150 degrees 135 degrees 100 degrees       limited by pain   AROM: Right Shoulder ABD    135 degrees       unable to move through full sagittal pain 91 degrees       limited by pain    AROM: Right Shoulder IR    20 degrees       hard end feel and pain --        WFL    AROM: Right Shoulder ER    --        WFL     AROM: Right Elbow Flex/Ext    WFL     AROM: Right Wrist Flexion    60 degrees     AROM: Right Wrist Extension    55 degrees     AROM: Right Wrist UD    50 degrees     AROM: Right Wrist RD    20 degrees     OT Cervical/Lumbar/Other ROM Measurements   Other: Girth Measurement/Comments Right IF: P1-5.9 cm, PIP- 6cm, P2- 5.3 cm, DIP- 5.1 cm // Left IF: P1-5.6 cm, PIP-5.5 cm, P2- 4.9 cm, DIP-4.9 cm // Right MCP [place]- 17.5 cm Right IF: P1-5.8 cm, PIP- 5.9cm, P2- 5.1 cm, DIP- 4.9 // Right MCP [place]- 17.5 cm Right IF: P1-5.9 cm, PIP- 5.9cm, P2- 5.2 cm, DIP- 4.9 // Right MCP [place]- 17.5 cm          .5 cm MF to DPC   MCP [place] at right hand 17.1 cm, distance from MF to DPC: 0     Outcome Measures          1/18/2024    17:11 2/6/2024    16:21 3/26/2024    14:10 4/23/2024    17:12 4/30/2024    21:59 5/20/2024    13:10   OT OBJECTIVE Outcome Measures   9 Hole Peg Test - Right Hand 180       14.82- removal only 180       Removal only 32.46   --        Unable to complete    9 Hole Peg Test - Left Hand 25.84       removing only        9 Hole Peg Test - Comments Patient able to place 5 pegs in pegboard in 3 minutes at right hand Patient placed 3 pegs into board today in 3 minutes       Right Hand Box and Blocks 23 23 15 21  23 w/ splint, 18 w/o splint   Left Hand Box and Blocks   48   57       Today's Treatment:     Education provided:  Yes: See treatment log for details of education provided  Methods: Discussion and Demonstration  Readiness: acceptance and  eager  Response: Demonstrated understanding and Verbalizes understanding    OT NEURO FLOW SHEET    EXERCISES CURRENT SESSION TIME   NEURO RE-ED  CPT 99085 TOTAL TIME FOR SESSION Not performed   AAROM/AROM     Strengthening      Strength     Gross Motor Control     Fine Motor Control     Sitting Jennifer/Balance     Standing Jennifer/Balance      Retraining     THERE ACT  CPT 43789 TOTAL TIME FOR SESSION 38-52 Minutes   Assessment Patient was full participant in discharge evaluation today. Objective measured were taken including ROM, Box and block, and  strength.     Pain, Vitals, Meds, Etc. Pain assessed and monitored; testing guided by patient report of pain at [facility]    HEP HEP reviewed and finalized including the following:  - Codman's exercise at right shoulder as tolerated  - Right hand composite flexion/extension  - Right thumb opposition  - Activity log exercises at [facility] and [facility]  Patient and caregiver expressed good understanding    Caregiver Thelma issued custom right hand thumb spica splint for use with right hand fine motor tasks with supervision. Caregiver expressed good understanding of safe and proper use and education provided on proper cleaning, with good understanding.     Functional Mobility     Transfers     THERE EX  CPT 88795 TOTAL TIME FOR SESSION Not performed   PROM     Activity Tolerance     SELF-CARE  CPT 64733 TOTAL TIME FOR SESSION 8-22 Minutes   Pt Education/Safety     ADL Training     IADL Training Patient participated in adaptive meal prep activity with use of tongs at left hand to simulate placing and removing food from toaster oven.    MODALITIES  CPT 75955 TOTAL TIME FOR SESSION Not performed   Ice/Heat     ATTENDED E-STIM  CPT 72742 TOTAL TIME FOR SESSION Not performed   Attended E-Stim     SPLINTING  CPT 83291 TOTAL TIME FOR SESSION Not performed   Fabrication/Check Out     Fit     Training     GROUP  CPT 34892 TOTAL TIME FOR SESSION Not performed              Goals Addressed                    This Visit's Progress     COMPLETED: OT Neuro/Deconditioning Goals          Short Term Goals Time Frame Result Comment/Progress   Assess gross motor control via Box and Block Assessment  4 weeks Met 9/14: R: 22; L: 56   Assess fine motor control via 9 hole peg test 4 weeks Met  9/14: R: Unable; L: 24.55   Improved automatic fine motor use at right hand with routine tasks (e.g. eating, writing, brushing teeth) to 25% of baseline  4 weeks Not met     Explore adaptive visual strategies to encourage looking to right side 4 weeks Met    Incorporate right hand in at least 2 self-care activities 4 weeks Met    Carry over HEP at least 3 times per week  4 weeks Met     Score at least 32 on Box and block test 4 weeks Not met 2/7/24: 23 blocks  3/26/24: 15 blocks  4/23/24: 21 blocks   Increase right hand  strength to 8 lbs 4 weeks Met    Engage in bilateral fine motor coordination task to open food or self-care containers with supervision during 4/6 trials for increased independence with meal preparation.  4 weeks Not tested    Complete at least 4 activities from RUE modified constraint induced movement therapy activity log across 3 weeks with assistance as needed. 4 weeks Met    Engage in simulated or real life food cutting activity using bilateral hands with no more than minimal assistance x 2 trials.  4 weeks Met    Perform RUE scapular internal rotation through at least 45 degrees with no more than 1 point increase in pain in order to promote joint mobilization and reduce scapular adhesion.  4 weeks Not met RE 2/26/24: 20 degrees, hard end feel and pain   Complete right hand three jaw patti or tip pinch activity to retrieve 1x1 inch block and place in target area with short opponens splint donned or manual inhibition of excessive opposition x 10 repetitions across 2 trials for improved coordination 4 weeks Not tested       Long Term Goals Time Frame Result Comment/Progress   Improve gross motor control as  evidenced by at least 10 block improved at RUE with Box and Block Assessment for improved ability to perform ADLs and IADLs  12 weeks Met     Patient will complete 9 hole peg test in under 3 minutes 12 weeks Not met    Decrease distance from Right MF to DPC to 2 cm for improved functional grasp at right hand 12 weeks Met     Improved automatic fine motor use at right hand with routine tasks (e.g. eating, writing, brushing teeth) to 75% of baseline 12 weeks Not met     Increase right hand  strength to at least 15 lbs 12 weeks Not met 3/26/24: 6 lbs  4/23/24: 11 lbs  5/20/24: 12 lbs   Increase Box and Block score to at least 40 12 weeks Not met 2/7/24: 23 blocks  3/26/24: 15 blocks                    Discharge information for [facility]:    Reason for D/C: Maximum potential met  Was care interrupted for medical reason?: Yes  Care was interrupted due to hospitalization?: No  Did the patient demonstrate increased independence: Yes  Demonstration of independence:: [place] in HEP, Increased functional independence, Increased functional activity  Has the patient returned to productive activity?: Yes  Increased production assessment:: Return to household activities  Skin integrity:  (NA)

## 2024-05-21 NOTE — OP OT TREATMENT LOG
OT NEURO FLOW SHEET    EXERCISES CURRENT SESSION TIME   NEURO RE-ED  CPT 93957 TOTAL TIME FOR SESSION Not performed   AAROM/AROM     Strengthening      Strength     Gross Motor Control     Fine Motor Control     Sitting Jennifer/Balance     Standing Jennifer/Balance      Retraining     THERE ACT  CPT 27303 TOTAL TIME FOR SESSION 38-52 Minutes   Assessment Patient was full participant in discharge evaluation today. Objective measured were taken including ROM, Box and block, and  strength.     Pain, Vitals, Meds, Etc. Pain assessed and monitored; testing guided by patient report of pain at [facility]    HEP HEP reviewed and finalized including the following:  - Codman's exercise at right shoulder as tolerated  - Right hand composite flexion/extension  - Right thumb opposition  - Activity log exercises at RUE and E  Patient and caregiver expressed good understanding    Caregiver Thelma issued custom right hand thumb spica splint for use with right hand fine motor tasks with supervision. Caregiver expressed good understanding of safe and proper use and education provided on proper cleaning, with good understanding.     Functional Mobility     Transfers     THERE EX  CPT 87151 TOTAL TIME FOR SESSION Not performed   PROM     Activity Tolerance     SELF-CARE  CPT 68958 TOTAL TIME FOR SESSION 8-22 Minutes   Pt Education/Safety     ADL Training     IADL Training Patient participated in adaptive meal prep activity with use of tongs at left hand to simulate placing and removing food from toaster oven.    MODALITIES  CPT 55278 TOTAL TIME FOR SESSION Not performed   Ice/Heat     ATTENDED E-STIM  CPT 66031 TOTAL TIME FOR SESSION Not performed   Attended E-Stim     SPLINTING  CPT 15427 TOTAL TIME FOR SESSION Not performed   Fabrication/Check Out     Fit     Training     GROUP  CPT 53874 TOTAL TIME FOR SESSION Not performed

## 2024-06-04 ENCOUNTER — HOSPITAL ENCOUNTER (OUTPATIENT)
Dept: SPEECH THERAPY | Age: 79
Setting detail: THERAPIES SERIES
Discharge: HOME | End: 2024-06-04
Attending: INTERNAL MEDICINE
Payer: MEDICARE

## 2024-06-04 DIAGNOSIS — I63.9 CEREBROVASCULAR ACCIDENT (CVA), UNSPECIFIED MECHANISM (CMS/HCC): Primary | ICD-10-CM

## 2024-06-04 PROCEDURE — 92507 TX SP LANG VOICE COMM INDIV: CPT | Mod: GN

## 2024-06-04 NOTE — PROGRESS NOTES
Speech-Language Pathology Visit      SLP DAILY NOTE FOR OUTPATIENT THERAPY    Patient: Jennifer Sams   MRN: [id number]  : 1945 79 y.o.  Referring Physician: Ney Aparicio,*  Date of Visit: 2024      Certification Dates: 24 through 24    Diagnosis:   1. Cerebrovascular accident (CVA), unspecified mechanism (CMS/HCC)        Chief Complaints:  difficulty communicating    Precautions:  Precautions Comments: aphasia, apraxia, pacemaker       TODAY'S VISIT:    Session Time:  Start Time: 1500  Stop Time: 1600  Time Calculation (min): 60 min   History/Vitals/Pain/Encounter Info - 24 1454          Injury History/Precautions/Daily Required Info    Primary Therapist Magnolia Lundy MS, CCC/SLP     Chief Complaint/Reason for Visit  difficulty communicating     Onset of Illness/Injury or Date of Surgery 23     Referring Physician Dr. Aparicio     Precautions Comments aphasia, apraxia, pacemaker     Limitations/Impairments other (see comments)     Document Type daily treatment     Patient/Family/Caregiver Comments/Observations Pt reports arm hurting     Patient reported fall since last visit No        Pain Assessment    Currently in pain Yes     Preferred Pain Scale number (Numeric Rating Pain Scale)     Pain Side/Orientation right     Pain: Body location Shoulder     Pain Rating (word): Post Activity 6 - moderate-severe pain        Pain Interventions    Intervention does not hurt with sitting.     Post Intervention Comments none.                    Daily Treatment Assessment and Plan - 24          Daily Treatment Assessment and Plan    Daily Outcome Summary Pt was stimulable for target functional words with vowel accuracy and final phoneme accuracy,  practiced adding on initial phoneme.  Continued practice with single and 2 syllable words. 2syllable words noted as diffiuclty     Plan and Recommendations Practice address, 2 step directions including body parts,  letters, numbers,. Continue syllable and vowel integrity in word approximations provided a direct model                      OBJECTIVE MEASUREMENTS TAKEN TODAY:    None Taken    Today's Treatment:    LANGUAGE SLP FLOW SHEET    SKILL AREA CURRENT SESSION   SPEECH THERAPY: LANGUAGE  CPT 86060        Pt will repeat an intelligible estimate to a familiar listener of multisyllabic words producing accurate syllable and vowel integrity, 80% of presentations.  6/4/2024  Long vowels: 5/5-   Short vowels 5/5   Practiced: vowel-consonant combos for names of people foods, and basic needs. Building up words to functional words.       Given simple picture cue pt will produce simple sentences of 3-4 words with simplified grammatical form and approximated content words to convey meaning, Min A, 80% of presentations.  6/4/2024  Naming: single syllables   Phonemes that present difficulty:  Initial position: t/k, m/b, m/p, -s, s/f,   Stimulable for all but, k, f  Final position: no difficulty. Vowels accuracy    2 syllable  Phonemes that present difficulty:  Initial: z  Medial:p  Final:   pt will state address: number, street and town, fading cues.     Pt will demonstrate oral reading approximations of 1-3 syllable words, 80% of presentations.     Pt will demonstrate reading comprehension of sentences to brief paragraph- answering multiple choice questions, 80% min A    follow 2 step directions identifying body parts, letters and numbers in 10's and 100's place value, 80% of presentations.    Education/Strategy Training 6/4/2024  Education won breaking down words.   Other                             MOTOR SPEECH  Pt will produce 1-3 syllable words   with /p,b,m,w,t,d,n,s,l/ in initial position 80% of presentations.     Revise:  Pt will produced /p,b,m,w,t,d,n,s,l/, in isolation and single syllables provided visual cue, 80%        STG 6 w RE: 5/14/2024  Not Met Pt can produce all target phonemes in isolation, initial syllables with  "direct model and VC.       Pt will repeat familiar greetings and common exchanges provided a direct model, 80% of presentations.     Revise:     STG 6 w  RE: 5/14/2024 Dialogue boxes: single words 15/17- 88% , + one sentence \" I don't want it.\"  Increase phrase length   SPOKEN LANG COMP  Pt will demonstrate understanding of complex sentences and short paragraphs with 80% accuracy to y/n questions.. STG 4 w  MET PN: 3/12/2024  Full length of paragraph: 80%     Consider increased accuracy to 90% and/or increased length of paragraph    Pt will follow 2 step commands with 4 components, 80% of presentations.      New:  STG 6 w  RE: 5/14/2024  Not met     2 step directions  2-step:   objects 100%  Adjectives 40%  First/then: 1/3 33%  Adjectives 0/3, 0%  2 adjectives 2/3, 66%  Before/after- in order: 2/3 66%  Before/after- reverse order 2/3 66%  Before/after + Adjectives: 1/3, 33%  Before/after + 2 Adjectives: 1/2, 50%  Before/after +3 Adjectives:; 0/2, 0%   SPOKEN LANG EXP  Provided a descriptive cue and a F6 pictures written words, pt will name the described item, 80% of trials, min A  STG 4 w  PN: 4/11/2024  MET 6/6 , 6/6, 6/6,  7/7, with word approximation 1-5 syllables.    STG 6 w  RE: 5/14/2024  Not Met  Mod A with use of word berg and sentence unscrambling    ORAL READING:  NEW         READING  Pt will match picture to word F4 90% of presentations.    New: Read phrase completion with F4 MC, 80%    STG 4 w RE: 5/14/2024  Not Met      3/12/2024  MET, revise to picture to phrase  67-70%       Pt will select picture/word for sentence completion, 80%    STG 6w  RE: 5/14/2024  Met  Reading Sentence completion, F2:  80%    STG 8 w RE: 5/14/2024  Not Met  Reading Comp of sentences, MC F3-8/10, min to mod A   Aphasia Severity Score                             "

## 2024-06-04 NOTE — OP SLP TREATMENT LOG
LANGUAGE SLP FLOW SHEET    SKILL AREA CURRENT SESSION   SPEECH THERAPY: LANGUAGE  CPT 45220        Pt will repeat an intelligible estimate to a familiar listener of multisyllabic words producing accurate syllable and vowel integrity, 80% of presentations.  6/4/2024  Long vowels: 5/5-   Short vowels 5/5   Practiced: vowel-consonant combos for names of people foods, and basic needs. Building up words to functional words.       Given simple picture cue pt will produce simple sentences of 3-4 words with simplified grammatical form and approximated content words to convey meaning, Min A, 80% of presentations.  6/4/2024  Naming: single syllables   Phonemes that present difficulty:  Initial position: t/k, m/b, m/p, -s, s/f,   Stimulable for all but, k, f  Final position: no difficulty. Vowels accuracy    2 syllable  Phonemes that present difficulty:  Initial: z  Medial:p  Final:   pt will state address: number, street and town, fading cues.     Pt will demonstrate oral reading approximations of 1-3 syllable words, 80% of presentations.     Pt will demonstrate reading comprehension of sentences to brief paragraph- answering multiple choice questions, 80% min A    follow 2 step directions identifying body parts, letters and numbers in 10's and 100's place value, 80% of presentations.    Education/Strategy Training 6/4/2024  Education won breaking down words.   Other                             MOTOR SPEECH  Pt will produce 1-3 syllable words   with /p,b,m,w,t,d,n,s,l/ in initial position 80% of presentations.     Revise:  Pt will produced /p,b,m,w,t,d,n,s,l/, in isolation and single syllables provided visual cue, 80%        STG 6 w RE: 5/14/2024  Not Met Pt can produce all target phonemes in isolation, initial syllables with direct model and VC.       Pt will repeat familiar greetings and common exchanges provided a direct model, 80% of presentations.     Revise:     STG 6 w  RE: 5/14/2024 Dialogue boxes: single words 15/17-  "88% , + one sentence \" I don't want it.\"  Increase phrase length   SPOKEN LANG COMP  Pt will demonstrate understanding of complex sentences and short paragraphs with 80% accuracy to y/n questions.. STG 4 w  MET PN: 3/12/2024  Full length of paragraph: 80%     Consider increased accuracy to 90% and/or increased length of paragraph    Pt will follow 2 step commands with 4 components, 80% of presentations.      New:  STG 6 w  RE: 5/14/2024  Not met     2 step directions  2-step:   objects 100%  Adjectives 40%  First/then: 1/3 33%  Adjectives 0/3, 0%  2 adjectives 2/3, 66%  Before/after- in order: 2/3 66%  Before/after- reverse order 2/3 66%  Before/after + Adjectives: 1/3, 33%  Before/after + 2 Adjectives: 1/2, 50%  Before/after +3 Adjectives:; 0/2, 0%   SPOKEN LANG EXP  Provided a descriptive cue and a F6 pictures written words, pt will name the described item, 80% of trials, min A  STG 4 w  PN: 4/11/2024  MET 6/6 , 6/6, 6/6,  7/7, with word approximation 1-5 syllables.    STG 6 w  RE: 5/14/2024  Not Met  Mod A with use of word berg and sentence unscrambling    ORAL READING:  NEW         READING  Pt will match picture to word F4 90% of presentations.    New: Read phrase completion with F4 MC, 80%    STG 4 w RE: 5/14/2024  Not Met      3/12/2024  MET, revise to picture to phrase  67-70%       Pt will select picture/word for sentence completion, 80%    STG 6w  RE: 5/14/2024  Met  Reading Sentence completion, F2:  80%    STG 8 w RE: 5/14/2024  Not Met  Reading Comp of sentences, MC F3-8/10, min to mod A   Aphasia Severity Score   "

## 2024-06-13 ENCOUNTER — HOSPITAL ENCOUNTER (OUTPATIENT)
Dept: SPEECH THERAPY | Age: 79
Setting detail: THERAPIES SERIES
Discharge: HOME | End: 2024-06-13
Attending: INTERNAL MEDICINE
Payer: MEDICARE

## 2024-06-13 DIAGNOSIS — I63.9 CEREBROVASCULAR ACCIDENT (CVA), UNSPECIFIED MECHANISM (CMS/HCC): Primary | ICD-10-CM

## 2024-06-13 PROCEDURE — 92507 TX SP LANG VOICE COMM INDIV: CPT | Mod: GN

## 2024-06-13 NOTE — LETTER
Dear DR. Aparicio,    Thank you for this referral. Please review the attached notes and plan of care for your approval.  Please contact our department with any questions.     Sincerely,     Magnolia Lundy CCC-SLP  [address] [facility]  [address]  [facility] [address]  Fax  [phone]    By co-signing this Plan of Care (POC) you agree to the following:  I have reviewed the the Plan of Care established by the therapist within this document and certify that the services are skilled and medically necessary. I have reviewed the plan and recommend that these services continue to meet the goals stated in this document.    PHYSICIAN SIGNATURE: __________________________________     DATE: ___________________  TIME: _____________           Speech Therapy Plan of Care 24   Effective from: 2024  Effective to: 2024    Plan ID: [id number]                Participants as of Finalize on 2024      Name Type Comments Contact Info    Magnolia Lundy CCC-SLP Speech Language Pathologist      Ney Aparicio MD Referring Provider  [phone]           Last Progress Notes Note       Author: Magnolia Lundy CCC-SLP Status: Signed Last edited: 2024  3:00 PM         Speech-Language Pathology Visit      SLP DAILY NOTE FOR OUTPATIENT THERAPY    Patient: Jennifer Sams   MRN: [id number]  : 1945 79 y.o.  Referring Physician: Ney Aparicio,*  Date of Visit: 2024      Certification Dates: 24 through 24    Diagnosis:   1. Cerebrovascular accident (CVA), unspecified mechanism (CMS/HCC)        Chief Complaints:  difficulty communicating    Precautions:  Precautions Comments: aphasia, apraxia, pacemaker       TODAY'S VISIT:    Session Time:  Start Time: 1500  Stop Time: 1600  Time Calculation (min): 60 min   History/Vitals/Pain/Encounter Info - 24 1454          Injury History/Precautions/Daily Required Info    Primary Therapist Magnolia Lundy MS, CCC/SLP      Chief Complaint/Reason for Visit  difficulty communicating     Onset of Illness/Injury or Date of Surgery 06/26/23     Referring Physician Dr. Aparicio     Precautions Comments aphasia, apraxia, pacemaker     Limitations/Impairments other (see comments)     Document Type daily treatment     Patient/Family/Caregiver Comments/Observations Pt reports arm hurting     Patient reported fall since last visit No        Pain Assessment    Currently in pain Yes     Preferred Pain Scale number (Numeric Rating Pain Scale)     Pain Side/Orientation right     Pain: Body location Shoulder     Pain Rating (word): Post Activity 6 - moderate-severe pain        Pain Interventions    Intervention does not hurt with sitting.     Post Intervention Comments none.                    Daily Treatment Assessment and Plan - 06/04/24 1454          Daily Treatment Assessment and Plan    Daily Outcome Summary Pt was stimulable for target functional words with vowel accuracy and final phoneme accuracy,  practiced adding on initial phoneme.  Continued practice with single and 2 syllable words. 2syllable words noted as diffiuclty     Plan and Recommendations Practice address, 2 step directions including body parts, letters, numbers,. Continue syllable and vowel integrity in word approximations provided a direct model                      OBJECTIVE MEASUREMENTS TAKEN TODAY:    None Taken    Today's Treatment:    LANGUAGE SLP FLOW SHEET    SKILL AREA CURRENT SESSION   SPEECH THERAPY: LANGUAGE  CPT 70054        Pt will repeat an intelligible estimate to a familiar listener of multisyllabic words producing accurate syllable and vowel integrity, 80% of presentations.  6/4/2024  Long vowels: 5/5-   Short vowels 5/5   Practiced: vowel-consonant combos for names of people foods, and basic needs. Building up words to functional words.       Given simple picture cue pt will produce simple sentences of 3-4 words with simplified grammatical form and  "approximated content words to convey meaning, Min A, 80% of presentations.  6/4/2024  Naming: single syllables   Phonemes that present difficulty:  Initial position: t/k, m/b, m/p, -s, s/f,   Stimulable for all but, k, f  Final position: no difficulty. Vowels accuracy    2 syllable  Phonemes that present difficulty:  Initial: z  Medial:p  Final:   pt will state address: number, street and town, fading cues.     Pt will demonstrate oral reading approximations of 1-3 syllable words, 80% of presentations.     Pt will demonstrate reading comprehension of sentences to brief paragraph- answering multiple choice questions, 80% min A    follow 2 step directions identifying body parts, letters and numbers in 10's and 100's place value, 80% of presentations.    Education/Strategy Training 6/4/2024  Education won breaking down words.   Other                             MOTOR SPEECH  Pt will produce 1-3 syllable words   with /p,b,m,w,t,d,n,s,l/ in initial position 80% of presentations.     Revise:  Pt will produced /p,b,m,w,t,d,n,s,l/, in isolation and single syllables provided visual cue, 80%        STG 6 w RE: 5/14/2024  Not Met Pt can produce all target phonemes in isolation, initial syllables with direct model and VC.       Pt will repeat familiar greetings and common exchanges provided a direct model, 80% of presentations.     Revise:     STG 6 w  RE: 5/14/2024 Dialogue boxes: single words 15/17- 88% , + one sentence \" I don't want it.\"  Increase phrase length   SPOKEN LANG COMP  Pt will demonstrate understanding of complex sentences and short paragraphs with 80% accuracy to y/n questions.. STG 4 w  MET PN: 3/12/2024  Full length of paragraph: 80%     Consider increased accuracy to 90% and/or increased length of paragraph    Pt will follow 2 step commands with 4 components, 80% of presentations.      New:  STG 6 w  RE: 5/14/2024  Not met     2 step directions  2-step:   objects 100%  Adjectives 40%  First/then: 1/3 " 33%  Adjectives 0/3, 0%  2 adjectives 2/3, 66%  Before/after- in order: 2/3 66%  Before/after- reverse order 2/3 66%  Before/after + Adjectives: 1/3, 33%  Before/after + 2 Adjectives: 1/2, 50%  Before/after +3 Adjectives:; 0/2, 0%   SPOKEN LANG EXP  Provided a descriptive cue and a F6 pictures written words, pt will name the described item, 80% of trials, min A  STG 4 w  PN: 4/11/2024  MET 6/6 , 6/6, 6/6,  7/7, with word approximation 1-5 syllables.    STG 6 w  RE: 5/14/2024  Not Met  Mod A with use of word berg and sentence unscrambling    ORAL READING:  NEW         READING  Pt will match picture to word F4 90% of presentations.    New: Read phrase completion with F4 MC, 80%    STG 4 w RE: 5/14/2024  Not Met      3/12/2024  MET, revise to picture to phrase  67-70%       Pt will select picture/word for sentence completion, 80%    STG 6w  RE: 5/14/2024  Met  Reading Sentence completion, F2:  80%    STG 8 w RE: 5/14/2024  Not Met  Reading Comp of sentences, MC F3-8/10, min to mod A   Aphasia Severity Score                                    Current Participants as of 6/13/2024      Name Type Comments Contact Info    Magnolia Lundy, CCC-SLP Speech Language Pathologist      Electronically signed by Magnolia Lundy CCC-SLP at 6/13/2024 1467 EDT    Ney Aparicio MD Referring Provider  [phone]

## 2024-06-13 NOTE — PROGRESS NOTES
"Speech-Language Pathology Visit      SLP DAILY NOTE FOR OUTPATIENT THERAPY    Patient: Jennifer Sams   MRN: [id number]  : 1945 79 y.o.  Referring Physician: Ney Aparicio,*  Date of Visit: 2024      Certification Dates: 24 through 24    Diagnosis:   1. Cerebrovascular accident (CVA), unspecified mechanism (CMS/HCC)        Chief Complaints:  difficulty communicating    Precautions:  Precautions Comments: aphasia, apraxia, pacemaker       TODAY'S VISIT:    Session Time:  Start Time: 1504  Stop Time: 1605  Time Calculation (min): 61 min   History/Vitals/Pain/Encounter Info - 24 1507          Injury History/Precautions/Daily Required Info    Primary Therapist Magnolia Lundy MS, CCC/SLP     Chief Complaint/Reason for Visit  difficulty communicating     Onset of Illness/Injury or Date of Surgery 23     Referring Physician Dr. Aparicio     Precautions Comments aphasia, apraxia, pacemaker     Document Type daily treatment     Patient/Family/Caregiver Comments/Observations Pt reports \"Frozen Shoulder\" receiving therapy at [facility]     Patient reported fall since last visit No        Pain Assessment    Currently in pain Yes     Pain Side/Orientation right     Pain: Body location Shoulder     FACES Pain Rating: Rest 6-->hurts even more     FACES Pain Rating: Activity 6-->hurts even more        Pain Interventions    Intervention move as needed- stretch at home.     Post Intervention Comments none                    Daily Treatment Assessment and Plan - 24 1507          Daily Treatment Assessment and Plan    Progress toward goals Progressing     Daily Outcome Summary Pt benefits from encouragement: \"do what I do\" to imitate vowels, CV and VC syllables (nonsense- building up to functional word list. Improved digit naming of single digits with carryover to say 3 digits of address.  REsponded well to errorless learning with therapist reading paragragh for pt to return to answer " read MC questions.     Plan and Recommendations Practice 6-2-3 for address.  Practice vowels, CV and VC with direct model. 3 word responses from [facility] work book VE3.                      OBJECTIVE MEASUREMENTS TAKEN TODAY:    None Taken    Today's Treatment:    LANGUAGE SLP FLOW SHEET    SKILL AREA CURRENT SESSION   SPEECH THERAPY: LANGUAGE  CPT 82702        Pt will repeat an intelligible estimate to a familiar listener of multisyllabic words producing accurate syllable and vowel integrity, 80% of presentations.  6/13/2024  Long vowels: 5/5-   Short vowels 3/5,  5/5 second trial.  Practiced: vowel-consonant combos for names of people foods, and basic needs. Building up words to functional words.      6/4/2024  Long vowels: 5/5-   Short vowels 5/5   Practiced: vowel-consonant combos for names of people foods, and basic needs. Building up words to functional words.       Given simple picture cue pt will produce simple sentences of 3-4 words with simplified grammatical form and approximated content words to convey meaning, Min A, 80% of presentations.  6/4/2024  Naming: single syllables   Phonemes that present difficulty:  Initial position: t/k, m/b, m/p, -s, s/f,   Stimulable for all but, k, f  Final position: no difficulty. Vowels accuracy    2 syllable  Phonemes that present difficulty:  Initial: z  Medial:p  Final:   pt will state address: [facility], street and town, fading cues.   6/13/2024 6-2-3 :  Max A  [address]  : approx- (I)  [address], [place]- approx- (I)  Cues for single digit naming practice prior to naming single digits for address.    Pt will demonstrate oral reading approximations of 1-3 syllable words, 80% of presentations.  6/13/2024  Naming of single digits. 0,1,2,3,4,5,6,7,8,9, (I) with 0-3, min cues for 4-9   Pt will demonstrate reading comprehension of sentences to brief paragraph- answering multiple choice questions, 80% min A 6/13/2024  Therapist read single sentence with pt  "following along X1.  Pt able to read question and MC answers to appropriately select 90% of trials with ability to return to the sentence to reread.  (Pt did not read sentence independently for first presentation.)   follow 2 step directions identifying body parts, letters and numbers in 10's and 100's place value, 80% of presentations. 6/13/2024 singles to thousands with repetition.     13/19  (68%)       Education/Strategy Training 6/4/2024  Education won breaking down words.   Other                             MOTOR SPEECH  Pt will produce 1-3 syllable words   with /p,b,m,w,t,d,n,s,l/ in initial position 80% of presentations.     Revise:  Pt will produced /p,b,m,w,t,d,n,s,l/, in isolation and single syllables provided visual cue, 80%        STG 6 w RE: 5/14/2024  Not Met Pt can produce all target phonemes in isolation, initial syllables with direct model and VC.       Pt will repeat familiar greetings and common exchanges provided a direct model, 80% of presentations.     Revise:     STG 6 w  RE: 5/14/2024 Dialogue boxes: single words 15/17- 88% , + one sentence \" I don't want it.\"  Increase phrase length   SPOKEN LANG COMP  Pt will demonstrate understanding of complex sentences and short paragraphs with 80% accuracy to y/n questions.. STG 4 w  MET PN: 3/12/2024  Full length of paragraph: 80%     Consider increased accuracy to 90% and/or increased length of paragraph    Pt will follow 2 step commands with 4 components, 80% of presentations.      New:  STG 6 w  RE: 5/14/2024  Not met     2 step directions  2-step:   objects 100%  Adjectives 40%  First/then: 1/3 33%  Adjectives 0/3, 0%  2 adjectives 2/3, 66%  Before/after- in order: 2/3 66%  Before/after- reverse order 2/3 66%  Before/after + Adjectives: 1/3, 33%  Before/after + 2 Adjectives: 1/2, 50%  Before/after +3 Adjectives:; 0/2, 0%   SPOKEN LANG EXP  Provided a descriptive cue and a F6 pictures written words, pt will name the described item, 80% of trials, " min A  STG 4 w  PN: 4/11/2024  MET 6/6 , 6/6, 6/6,  7/7, with word approximation 1-5 syllables.    STG 6 w  RE: 5/14/2024  Not Met  Mod A with use of word berg and sentence unscrambling    ORAL READING:  NEW         READING  Pt will match picture to word F4 90% of presentations.    New: Read phrase completion with F4 MC, 80%    STG 4 w RE: 5/14/2024  Not Met      3/12/2024  MET, revise to picture to phrase  67-70%       Pt will select picture/word for sentence completion, 80%    STG 6w  RE: 5/14/2024  Met  Reading Sentence completion, F2:  80%    STG 8 w RE: 5/14/2024  Not Met  Reading Comp of sentences, MC F3-8/10, min to mod A   Aphasia Severity Score

## 2024-06-13 NOTE — OP SLP TREATMENT LOG
LANGUAGE SLP FLOW SHEET    SKILL AREA CURRENT SESSION   SPEECH THERAPY: LANGUAGE  CPT 37778        Pt will repeat an intelligible estimate to a familiar listener of multisyllabic words producing accurate syllable and vowel integrity, 80% of presentations.  6/13/2024  Long vowels: 5/5-   Short vowels 3/5,  5/5 second trial.  Practiced: vowel-consonant combos for names of people foods, and basic needs. Building up words to functional words.      6/4/2024  Long vowels: 5/5-   Short vowels 5/5   Practiced: vowel-consonant combos for names of people foods, and basic needs. Building up words to functional words.       Given simple picture cue pt will produce simple sentences of 3-4 words with simplified grammatical form and approximated content words to convey meaning, Min A, 80% of presentations.  6/4/2024  Naming: single syllables   Phonemes that present difficulty:  Initial position: t/k, m/b, m/p, -s, s/f,   Stimulable for all but, k, f  Final position: no difficulty. Vowels accuracy    2 syllable  Phonemes that present difficulty:  Initial: z  Medial:p  Final:   pt will state address: number, street and town, fading cues.   6/13/2024  6-2-3 :  Max A  [address]  : approx- (I)  [address], [place]- approx- (I)  Cues for single digit naming practice prior to naming single digits for address.    Pt will demonstrate oral reading approximations of 1-3 syllable words, 80% of presentations.  6/13/2024  Naming of single digits. 0,1,2,3,4,5,6,7,8,9, (I) with 0-3, min cues for 4-9   Pt will demonstrate reading comprehension of sentences to brief paragraph- answering multiple choice questions, 80% min A 6/13/2024  Therapist read single sentence with pt following along X1.  Pt able to read question and MC answers to appropriately select 90% of trials with ability to return to the sentence to reread.  (Pt did not read sentence independently for first presentation.)   follow 2 step directions identifying body parts,  "letters and numbers in 10's and 100's place value, 80% of presentations. 6/13/2024 singles to thousands with repetition.     13/19  (68%)       Education/Strategy Training 6/4/2024  Education won breaking down words.   Other                             MOTOR SPEECH  Pt will produce 1-3 syllable words   with /p,b,m,w,t,d,n,s,l/ in initial position 80% of presentations.     Revise:  Pt will produced /p,b,m,w,t,d,n,s,l/, in isolation and single syllables provided visual cue, 80%        STG 6 w RE: 5/14/2024  Not Met Pt can produce all target phonemes in isolation, initial syllables with direct model and VC.       Pt will repeat familiar greetings and common exchanges provided a direct model, 80% of presentations.     Revise:     STG 6 w  RE: 5/14/2024 Dialogue boxes: single words 15/17- 88% , + one sentence \" I don't want it.\"  Increase phrase length   SPOKEN LANG COMP  Pt will demonstrate understanding of complex sentences and short paragraphs with 80% accuracy to y/n questions.. STG 4 w  MET PN: 3/12/2024  Full length of paragraph: 80%     Consider increased accuracy to 90% and/or increased length of paragraph    Pt will follow 2 step commands with 4 components, 80% of presentations.      New:  STG 6 w  RE: 5/14/2024  Not met     2 step directions  2-step:   objects 100%  Adjectives 40%  First/then: 1/3 33%  Adjectives 0/3, 0%  2 adjectives 2/3, 66%  Before/after- in order: 2/3 66%  Before/after- reverse order 2/3 66%  Before/after + Adjectives: 1/3, 33%  Before/after + 2 Adjectives: 1/2, 50%  Before/after +3 Adjectives:; 0/2, 0%   SPOKEN LANG EXP  Provided a descriptive cue and a F6 pictures written words, pt will name the described item, 80% of trials, min A  STG 4 w  PN: 4/11/2024  MET 6/6 , 6/6, 6/6,  7/7, with word approximation 1-5 syllables.    STG 6 w  RE: 5/14/2024  Not Met  Mod A with use of word berg and sentence unscrambling    ORAL READING:  NEW         READING  Pt will match picture to word F4 90% of " presentations.    New: Read phrase completion with F4 MC, 80%    STG 4 w RE: 5/14/2024  Not Met      3/12/2024  MET, revise to picture to phrase  67-70%       Pt will select picture/word for sentence completion, 80%    STG 6w  RE: 5/14/2024  Met  Reading Sentence completion, F2:  80%    STG 8 w RE: 5/14/2024  Not Met  Reading Comp of sentences, MC F3-8/10, min to mod A   Aphasia Severity Score

## 2024-06-18 ENCOUNTER — HOSPITAL ENCOUNTER (OUTPATIENT)
Dept: SPEECH THERAPY | Age: 79
Setting detail: THERAPIES SERIES
Discharge: HOME | End: 2024-06-18
Attending: INTERNAL MEDICINE
Payer: MEDICARE

## 2024-06-18 DIAGNOSIS — I63.9 CEREBROVASCULAR ACCIDENT (CVA), UNSPECIFIED MECHANISM (CMS/HCC): Primary | ICD-10-CM

## 2024-06-18 PROCEDURE — 92507 TX SP LANG VOICE COMM INDIV: CPT | Mod: GN

## 2024-06-18 RX ORDER — ROSUVASTATIN CALCIUM 20 MG/1
20 TABLET, COATED ORAL DAILY
Qty: 90 TABLET | Refills: 3 | Status: SHIPPED | OUTPATIENT
Start: 2024-06-18

## 2024-06-18 NOTE — PROGRESS NOTES
"Speech-Language Pathology Visit      SLP DAILY NOTE FOR OUTPATIENT THERAPY    Patient: Jennifer Sams   MRN: [id number]  : 1945 79 y.o.  Referring Physician: Ney Aparicio,*  Date of Visit: 2024      Certification Dates: 24 through 24    Diagnosis:   1. Cerebrovascular accident (CVA), unspecified mechanism (CMS/HCC)        Chief Complaints:  difficulty communicating    Precautions:  Precautions Comments: aphasia, apraxia, pacemaker       TODAY'S VISIT:    Session Time:  Start Time: 1500  Stop Time: 1600  Time Calculation (min): 60 min   History/Vitals/Pain/Encounter Info - 24 1507          Injury History/Precautions/Daily Required Info    Primary Therapist Edda Joseph M.S.CCC-SLP covering for Magnolia Vegakler MS, CCC/SLP     Chief Complaint/Reason for Visit  difficulty communicating     Onset of Illness/Injury or Date of Surgery 23     Precautions Comments aphasia, apraxia, pacemaker     Document Type daily treatment     Patient/Family/Caregiver Comments/Observations Pt pleasant and cooperative throughout session. Attempting to tell me her friend dropped her off. Kept stating \"she can't say it.\" when asked a question.     Patient reported fall since last visit No        Pain Assessment    Currently in pain No/Denies        Pain Interventions    Intervention none     Post Intervention Comments none                    Daily Treatment Assessment and Plan - 24 1507          Daily Treatment Assessment and Plan    Progress toward goals Progressing     Daily Outcome Summary Pt did well with CV and VC B words. Followed 1 and 2 directions well. Attempted 3 word responses from [facility] work book VE3; however, pt with increased frustration.     Plan and Recommendations Practice 6-2-3 for address.  Practice vowels, CV and VC with direct model. 3 word responses from [facility] work book VE[facility].                      OBJECTIVE MEASUREMENTS TAKEN TODAY:    None " Taken    Today's Treatment:    LANGUAGE SLP FLOW SHEET    SKILL AREA CURRENT SESSION   SPEECH THERAPY: LANGUAGE  CPT 02511        Pt will repeat an intelligible estimate to a familiar listener of multisyllabic words producing accurate syllable and vowel integrity, 80% of presentations.  6/18/24:   Long Vowels: 5/5  Short Vowels: 5/5  Practiced: CV and CVC words with 60% accuracy pt was repeat (B) words.  Automatic Speech task: 1-10 given a starter cue. Days of the week, given min cues throughout.     6/13/2024  Long vowels: 5/5-   Short vowels 3/5,  5/5 second trial.  Practiced: vowel-consonant combos for names of people foods, and basic needs. Building up words to functional words.      6/4/2024  Long vowels: 5/5-   Short vowels 5/5   Practiced: vowel-consonant combos for names of people foods, and basic needs. Building up words to functional words.       Given simple picture cue pt will produce simple sentences of 3-4 words with simplified grammatical form and approximated content words to convey meaning, Min A, 80% of presentations.  6/4/2024  Naming: single syllables   Phonemes that present difficulty:  Initial position: t/k, m/b, m/p, -s, s/f,   Stimulable for all but, k, f  Final position: no difficulty. Vowels accuracy    2 syllable  Phonemes that present difficulty:  Initial: z  Medial:p  Final:   pt will state address: number, street and town, fading cues.   6/18/24:   6-2-3: Mod A. Pt tracing numbers on table.  [facility]: Approx (I)  [place]: Approx (I)    6/13/2024  6-2-3 :  Max A  [address]  : approx- (I)  [address], [place]- approx- (I)  Cues for single digit naming practice prior to naming single digits for address.    Pt will demonstrate oral reading approximations of 1-3 syllable words, 80% of presentations.  6/13/2024  Naming of single digits. 0,1,2,3,4,5,6,7,8,9, (I) with 0-3, min cues for 4-9   Pt will demonstrate reading comprehension of sentences to brief paragraph- answering  "multiple choice questions, 80% min A 6/13/2024  Therapist read single sentence with pt following along X1.  Pt able to read question and MC answers to appropriately select 90% of trials with ability to return to the sentence to reread.  (Pt did not read sentence independently for first presentation.)   follow 2 step directions identifying body parts, letters and numbers in 10's and 100's place value, 80% of presentations. 6/18/24- Pt followed 1 step directions with 100% accuracy.   Pt was able to follow 2 step directions with 80% accuracy (4/5). Max cues for 5/5.     6/13/2024 singles to thousands with repetition.     13/19  (68%)       Education/Strategy Training 6/4/2024  Education won breaking down words.   Other                             MOTOR SPEECH  Pt will produce 1-3 syllable words   with /p,b,m,w,t,d,n,s,l/ in initial position 80% of presentations.     Revise:  Pt will produced /p,b,m,w,t,d,n,s,l/, in isolation and single syllables provided visual cue, 80%        STG 6 w RE: 5/14/2024  Not Met Pt can produce all target phonemes in isolation, initial syllables with direct model and VC.       Pt will repeat familiar greetings and common exchanges provided a direct model, 80% of presentations.     Revise:     STG 6 w  RE: 5/14/2024 Dialogue boxes: single words 15/17- 88% , + one sentence \" I don't want it.\"  Increase phrase length   SPOKEN LANG COMP  Pt will demonstrate understanding of complex sentences and short paragraphs with 80% accuracy to y/n questions.. STG 4 w  MET PN: 3/12/2024  Full length of paragraph: 80%     Consider increased accuracy to 90% and/or increased length of paragraph    Pt will follow 2 step commands with 4 components, 80% of presentations.      New:  STG 6 w  RE: 5/14/2024  Not met     2 step directions  2-step:   objects 100%  Adjectives 40%  First/then: 1/3 33%  Adjectives 0/3, 0%  2 adjectives 2/3, 66%  Before/after- in order: 2/3 66%  Before/after- reverse order 2/3 " 66%  Before/after + Adjectives: 1/3, 33%  Before/after + 2 Adjectives: 1/2, 50%  Before/after +3 Adjectives:; 0/2, 0%   SPOKEN LANG EXP  Provided a descriptive cue and a F6 pictures written words, pt will name the described item, 80% of trials, min A  STG 4 w  PN: 4/11/2024  MET 6/6 , 6/6, 6/6,  7/7, with word approximation 1-5 syllables.    STG 6 w  RE: 5/14/2024  Not Met  Mod A with use of word berg and sentence unscrambling    ORAL READING:  NEW         READING  Pt will match picture to word F4 90% of presentations.    New: Read phrase completion with F4 MC, 80%    STG 4 w RE: 5/14/2024  Not Met      3/12/2024  MET, revise to picture to phrase  67-70%       Pt will select picture/word for sentence completion, 80%    STG 6w  RE: 5/14/2024  Met  Reading Sentence completion, F2:  80%    STG 8 w RE: 5/14/2024  Not Met  Reading Comp of sentences, MC F3-8/10, min to mod A   Aphasia Severity Score

## 2024-06-18 NOTE — TELEPHONE ENCOUNTER
Refill request received from     Last appt w/ 3/14/24 w/ Dr. Salter     Upcoming appt on 10/15/24 w/ Dr. Salter     Refill approved

## 2024-06-18 NOTE — OP SLP TREATMENT LOG
LANGUAGE SLP FLOW SHEET    SKILL AREA CURRENT SESSION   SPEECH THERAPY: LANGUAGE  CPT 19713        Pt will repeat an intelligible estimate to a familiar listener of multisyllabic words producing accurate syllable and vowel integrity, 80% of presentations.  6/18/24:   Long Vowels: 5/5  Short Vowels: 5/5  Practiced: CV and CVC words with 60% accuracy pt was repeat (B) words.  Automatic Speech task: 1-10 given a starter cue. Days of the week, given min cues throughout.     6/13/2024  Long vowels: 5/5-   Short vowels 3/5,  5/5 second trial.  Practiced: vowel-consonant combos for names of people foods, and basic needs. Building up words to functional words.      6/4/2024  Long vowels: 5/5-   Short vowels 5/5   Practiced: vowel-consonant combos for names of people foods, and basic needs. Building up words to functional words.       Given simple picture cue pt will produce simple sentences of 3-4 words with simplified grammatical form and approximated content words to convey meaning, Min A, 80% of presentations.  6/4/2024  Naming: single syllables   Phonemes that present difficulty:  Initial position: t/k, m/b, m/p, -s, s/f,   Stimulable for all but, k, f  Final position: no difficulty. Vowels accuracy    2 syllable  Phonemes that present difficulty:  Initial: z  Medial:p  Final:   pt will state address: number, street and town, fading cues.   6/18/24:   6-2-3: Mod A. Pt tracing numbers on table.  [facility]: Approx (I)  [place]: Approx (I)    6/13/2024 6-2-3 :  Max A  [address]  : approx- (I)  [address], [place]- approx- (I)  Cues for single digit naming practice prior to naming single digits for address.    Pt will demonstrate oral reading approximations of 1-3 syllable words, 80% of presentations.  6/13/2024  Naming of single digits. 0,1,2,3,4,5,6,7,8,9, (I) with 0-3, min cues for 4-9   Pt will demonstrate reading comprehension of sentences to brief paragraph- answering multiple choice questions, 80%  "min A 6/13/2024  Therapist read single sentence with pt following along X1.  Pt able to read question and MC answers to appropriately select 90% of trials with ability to return to the sentence to reread.  (Pt did not read sentence independently for first presentation.)   follow 2 step directions identifying body parts, letters and numbers in 10's and 100's place value, 80% of presentations. 6/18/24- Pt followed 1 step directions with 100% accuracy.   Pt was able to follow 2 step directions with 80% accuracy (4/5). Max cues for 5/5.     6/13/2024 singles to thousands with repetition.     13/19  (68%)       Education/Strategy Training 6/4/2024  Education won breaking down words.   Other                             MOTOR SPEECH  Pt will produce 1-3 syllable words   with /p,b,m,w,t,d,n,s,l/ in initial position 80% of presentations.     Revise:  Pt will produced /p,b,m,w,t,d,n,s,l/, in isolation and single syllables provided visual cue, 80%        STG 6 w RE: 5/14/2024  Not Met Pt can produce all target phonemes in isolation, initial syllables with direct model and VC.       Pt will repeat familiar greetings and common exchanges provided a direct model, 80% of presentations.     Revise:     STG 6 w  RE: 5/14/2024 Dialogue boxes: single words 15/17- 88% , + one sentence \" I don't want it.\"  Increase phrase length   SPOKEN LANG COMP  Pt will demonstrate understanding of complex sentences and short paragraphs with 80% accuracy to y/n questions.. STG 4 w  MET PN: 3/12/2024  Full length of paragraph: 80%     Consider increased accuracy to 90% and/or increased length of paragraph    Pt will follow 2 step commands with 4 components, 80% of presentations.      New:  STG 6 w  RE: 5/14/2024  Not met     2 step directions  2-step:   objects 100%  Adjectives 40%  First/then: 1/3 33%  Adjectives 0/3, 0%  2 adjectives 2/3, 66%  Before/after- in order: 2/3 66%  Before/after- reverse order 2/3 66%  Before/after + Adjectives: 1/3, " 33%  Before/after + 2 Adjectives: 1/2, 50%  Before/after +3 Adjectives:; 0/2, 0%   SPOKEN LANG EXP  Provided a descriptive cue and a F6 pictures written words, pt will name the described item, 80% of trials, min A  STG 4 w  PN: 4/11/2024  MET 6/6 , 6/6, 6/6,  7/7, with word approximation 1-5 syllables.    STG 6 w  RE: 5/14/2024  Not Met  Mod A with use of word berg and sentence unscrambling    ORAL READING:  NEW         READING  Pt will match picture to word F4 90% of presentations.    New: Read phrase completion with F4 MC, 80%    STG 4 w RE: 5/14/2024  Not Met      3/12/2024  MET, revise to picture to phrase  67-70%       Pt will select picture/word for sentence completion, 80%    STG 6w  RE: 5/14/2024  Met  Reading Sentence completion, F2:  80%    STG 8 w RE: 5/14/2024  Not Met  Reading Comp of sentences, MC F3-8/10, min to mod A   Aphasia Severity Score

## 2024-06-25 ENCOUNTER — HOSPITAL ENCOUNTER (OUTPATIENT)
Dept: SPEECH THERAPY | Age: 79
Setting detail: THERAPIES SERIES
Discharge: HOME | End: 2024-06-25
Attending: INTERNAL MEDICINE
Payer: MEDICARE

## 2024-06-25 DIAGNOSIS — I63.9 CEREBROVASCULAR ACCIDENT (CVA), UNSPECIFIED MECHANISM (CMS/HCC): Primary | ICD-10-CM

## 2024-06-25 PROCEDURE — 92507 TX SP LANG VOICE COMM INDIV: CPT | Mod: GN

## 2024-06-25 NOTE — OP SLP TREATMENT LOG
LANGUAGE SLP FLOW SHEET    SKILL AREA CURRENT SESSION   SPEECH THERAPY: LANGUAGE  CPT 48676        Pt will repeat an intelligible estimate to a familiar listener of multisyllabic words producing accurate syllable and vowel integrity, 80% of presentations.  6/25/2025  Named:2 syllable with fading cues   Balloon, bathroom, taco- no, button, baby, niker/spider, shower, soccer, gesture/question, elobow/elbow- 6/10  1-2 syllables- direct model  /b/ 10/10 (S)  /p/ 10/10- min A  /m/ 10/10- min A  /w/ 10/10- (S)    6/18/24:   Long Vowels: 5/5  Short Vowels: 5/5  Practiced: CV and CVC words with 60% accuracy pt was repeat (B) words.  Automatic Speech task: 1-10 given a starter cue. Days of the week, given min cues throughout.     6/13/2024  Long vowels: 5/5-   Short vowels 3/5,  5/5 second trial.  Practiced: vowel-consonant combos for names of people foods, and basic needs. Building up words to functional words.      6/4/2024  Long vowels: 5/5-   Short vowels 5/5   Practiced: vowel-consonant combos for names of people foods, and basic needs. Building up words to functional words.       Given simple picture cue pt will produce simple sentences of 3-4 words with simplified grammatical form and approximated content words to convey meaning, Min A, 80% of presentations.  6/25/2024  Photo naming approximation: fading cues 9/10  Build sentences:   1 pronoun, simple sentences  1 noun, Present Progressive  1 pronoun, Present Progressive  2 nouns, Non reversible- increased difficulty    6/4/2024  Naming: single syllables   Phonemes that present difficulty:  Initial position: t/k, m/b, m/p, -s, s/f,   Stimulable for all but, k, f  Final position: no difficulty. Vowels accuracy    2 syllable  Phonemes that present difficulty:  Initial: z  Medial:p  Final:   pt will state address: number, street and town, fading cues.   6/18/24:   6-2-3: Mod A. Pt tracing numbers on table.  [address]: Approx (I)  [place]: Approx  "(I)    6/13/2024  6-2-3 :  Max A  [address]  : approx- (I)  [address], [place]- approx- (I)  Cues for single digit naming practice prior to naming single digits for address.    Pt will demonstrate oral reading approximations of 1-3 syllable words, 80% of presentations.  6/13/2024  Naming of single digits. 0,1,2,3,4,5,6,7,8,9, (I) with 0-3, min cues for 4-9   Pt will demonstrate reading comprehension of sentences to brief paragraph- answering multiple choice questions, 80% min A 6/13/2024  Therapist read single sentence with pt following along X1.  Pt able to read question and MC answers to appropriately select 90% of trials with ability to return to the sentence to reread.  (Pt did not read sentence independently for first presentation.)   follow 2 step directions identifying body parts, letters and numbers in 10's and 100's place value, 80% of presentations. 6/18/24- Pt followed 1 step directions with 100% accuracy.   Pt was able to follow 2 step directions with 80% accuracy (4/5). Max cues for 5/5.     6/13/2024 singles to thousands with repetition.     13/19  (68%)        Education/Strategy Training 6/25/2025  [employer] website with pt and requested a trial device.     6/4/2024  Education won breaking down words.   Other                             MOTOR SPEECH  Pt will produce 1-3 syllable words   with /p,b,m,w,t,d,n,s,l/ in initial position 80% of presentations.     Revise:  Pt will produced /p,b,m,w,t,d,n,s,l/, in isolation and single syllables provided visual cue, 80%        STG 6 w RE: 5/14/2024  Not Met Pt can produce all target phonemes in isolation, initial syllables with direct model and VC.       Pt will repeat familiar greetings and common exchanges provided a direct model, 80% of presentations.     Revise:     STG 6 w  RE: 5/14/2024 Dialogue boxes: single words 15/17- 88% , + one sentence \" I don't want it.\"  Increase phrase length   SPOKEN LANG COMP  Pt will demonstrate " understanding of complex sentences and short paragraphs with 80% accuracy to y/n questions.. STG 4 w  MET PN: 3/12/2024  Full length of paragraph: 80%     Consider increased accuracy to 90% and/or increased length of paragraph    Pt will follow 2 step commands with 4 components, 80% of presentations.      New:  STG 6 w  RE: 5/14/2024  Not met     2 step directions  2-step:   objects 100%  Adjectives 40%  First/then: 1/3 33%  Adjectives 0/3, 0%  2 adjectives 2/3, 66%  Before/after- in order: 2/3 66%  Before/after- reverse order 2/3 66%  Before/after + Adjectives: 1/3, 33%  Before/after + 2 Adjectives: 1/2, 50%  Before/after +3 Adjectives:; 0/2, 0%   SPOKEN LANG EXP  Provided a descriptive cue and a F6 pictures written words, pt will name the described item, 80% of trials, min A  STG 4 w  PN: 4/11/2024  MET 6/6 , 6/6, 6/6,  7/7, with word approximation 1-5 syllables.    STG 6 w  RE: 5/14/2024  Not Met  Mod A with use of word berg and sentence unscrambling    ORAL READING:  NEW         READING  Pt will match picture to word F4 90% of presentations.    New: Read phrase completion with F4 MC, 80%    STG 4 w RE: 5/14/2024  Not Met      3/12/2024  MET, revise to picture to phrase  67-70%       Pt will select picture/word for sentence completion, 80%    STG 6w  RE: 5/14/2024  Met  Reading Sentence completion, F2:  80%    STG 8 w RE: 5/14/2024  Not Met  Reading Comp of sentences, MC F3-8/10, min to mod A   Aphasia Severity Score

## 2024-06-25 NOTE — PROGRESS NOTES
Speech-Language Pathology Progress Note      SLP PROGRESS NOTE FOR OUTPATIENT THERAPY    Patient: Jennifer Sams MRN: [id number]  : 1945 79 y.o.  Referring Physician: Ney Aparicio,*  Date of Visit: 2024      Certification Dates: 24 through 24    Recommended Frequency & Duration:  2 times/week for up to 3 months   PN: 2024    Diagnosis:   1. Cerebrovascular accident (CVA), unspecified mechanism (CMS/HCC)        Chief Complaints:  No chief complaint on file.      Precautions:   Precautions Comments: aphasia,    TODAY'S VISIT:    Session Time:  Start Time: 1500  Stop Time: 1600  Time Calculation (min): 60 min   General Information - 24 1526          Session Details    Document Type progress note     Mode of Treatment individual therapy        General Information    Onset of Illness/Injury or Date of Surgery 23     Referring Physician Dr. Aparicio     History of present illness/functional impairment  is a 79 y.o. female who presented to [facility] on  after she was not seen by her friends for a few days.  Ms. Sams lives alone and when she was found by EMS she was confused and combative.  In the emergency department she was aphasic with right-sided weakness and hypertensive to the 190 systolic.  CT scan of the head revealed an acute infarct in left MCA with mild bleeding.  Echo showed an EF of 43% and a bubble study was negative.  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 Pt transferred to  [facility] on 2023 Principal problem  Acute ischemic left MCA stroke (CMS/HCC). PMH AAA, heart block s/p cardiac pacemaker, glaucoma, hyperlipidemia, coronary artery disease, She was evaluated by speech therapy and cleared for a soft and bite-size diet with moderately thick liquids.  2023:  Diet SB6 with mildly thick liquids (MT2)  2023: Upgraded to EC7 with thin liquids.  Pt discharged home with 24 hr care and home  health services recieving speech therapy for motor speech and aphasia.  Transferred to outpatient and evaluated at [facility] on 8/18/2024.  Ms. Sams presents for her second re-eval at [facility] 2/8/2024..     Precautions Comments aphasia,     Patient/Family/Caregiver Comments/Observations Pt attended independently this date. Viewed [employer] website with therapist.                      Pain and Vitals - 06/25/24 1526          Pain Assessment    Currently in pain No/Denies                    SLP - 06/25/24 1526          Speech Therapy    Speech Therapy Specialty Traditional Neuro Program ST        SLP Frequency and Duration    Frequency of treatment 2 times/week     Speech Duration 3 months     SLP Custom Frequency and Duration PN: 6/14/2024     SLP Cert From 05/14/24     SLP Cert To 07/09/24     Date SLP POC was sent to provider 05/14/24     Signed SLP Plan of Care received?  Yes                    Assessment - 06/25/24 1909          Assessment    Clinical Assessment Pt continues to present with moderate expressive/receptive aphasia complicated with motor speech- dyspraxia.  Therapy will continue with established goals as well as probing for functional skills for use of [employer] trial device on ipad-like touchscreen.     Plan and Recommendations continue with plan of care.     Planned Services CPT 87191 Speech Lang Voice Comm and/or Auditory Proc (Treatment of speech, language, voice, communication and/or hearing processing disorder)                     OBJECTIVE MEASUREMENTS/DATA:    None Taken    Outcome Measures          2/8/2024    17:02 3/12/2024    20:12 5/14/2024    21:32   SLP Outcome Measures   FCM: Motor Speech 4-->Level 4 4-->Level 4 4-->Level 4       approaching 5   FCM: Reading 4-->Level 4 4-->Level 4 5-->Level 5   FCM: Spoken Language, Comprehension 5-->Level 5 5-->Level 5 5-->Level 5   FCM: Spoken Language, Expression 4-->Level 4 4-->Level 4 4-->Level 4       Today's Treatment::     Education  provided:  Yes: See treatment log for details of education provided  Methods: Discussion, Handout, Demonstration, and Video  Readiness: acceptance  Response: Needs reinforcement    LANGUAGE SLP FLOW SHEET    SKILL AREA CURRENT SESSION   SPEECH THERAPY: LANGUAGE  CPT 01009        Pt will repeat an intelligible estimate to a familiar listener of multisyllabic words producing accurate syllable and vowel integrity, 80% of presentations.  6/25/2025  Named:2 syllable with fading cues   Balloon, bathroom, taco- no, button, baby, niker/spider, shower, soccer, gesture/question, elobow/elbow- 6/10  1-2 syllables- direct model  /b/ 10/10 (S)  /p/ 10/10- min A  /m/ 10/10- min A  /w/ 10/10- (S)    6/18/24:   Long Vowels: 5/5  Short Vowels: 5/5  Practiced: CV and CVC words with 60% accuracy pt was repeat (B) words.  Automatic Speech task: 1-10 given a starter cue. Days of the week, given min cues throughout.     6/13/2024  Long vowels: 5/5-   Short vowels 3/5,  5/5 second trial.  Practiced: vowel-consonant combos for names of people foods, and basic needs. Building up words to functional words.      6/4/2024  Long vowels: 5/5-   Short vowels 5/5   Practiced: vowel-consonant combos for names of people foods, and basic needs. Building up words to functional words.       Given simple picture cue pt will produce simple sentences of 3-4 words with simplified grammatical form and approximated content words to convey meaning, Min A, 80% of presentations.  6/25/2024  Photo naming approximation: fading cues 9/10  Build sentences:   1 pronoun, simple sentences  1 noun, Present Progressive  1 pronoun, Present Progressive  2 nouns, Non reversible- increased difficulty    6/4/2024  Naming: single syllables   Phonemes that present difficulty:  Initial position: t/k, m/b, m/p, -s, s/f,   Stimulable for all but, k, f  Final position: no difficulty. Vowels accuracy    2 syllable  Phonemes that present difficulty:  Initial: z  Medial:p  Final:    pt will state address: number, street and town, fading cues.   6/18/24:   6-2-3: Mod A. Pt tracing numbers on table.  [facility]: Approx (I)  [place]: Approx (I)    6/13/2024 6-2-3 :  Max A  [address]  : approx- (I)  [facility] [place]- approx- (I)  Cues for single digit naming practice prior to naming single digits for address.    Pt will demonstrate oral reading approximations of 1-3 syllable words, 80% of presentations.  6/13/2024  Naming of single digits. 0,1,2,3,4,5,6,7,8,9, (I) with 0-3, min cues for 4-9   Pt will demonstrate reading comprehension of sentences to brief paragraph- answering multiple choice questions, 80% min A 6/13/2024  Therapist read single sentence with pt following along X1.  Pt able to read question and MC answers to appropriately select 90% of trials with ability to return to the sentence to reread.  (Pt did not read sentence independently for first presentation.)   follow 2 step directions identifying body parts, letters and numbers in 10's and 100's place value, 80% of presentations. 6/18/24- Pt followed 1 step directions with 100% accuracy.   Pt was able to follow 2 step directions with 80% accuracy (4/5). Max cues for 5/5.     6/13/2024 singles to thousands with repetition.     13/19  (68%)        Education/Strategy Training 6/25/2025  Share [employer] website with pt and requested a trial device.     6/4/2024  Education won breaking down words.   Other                             MOTOR SPEECH  Pt will produce 1-3 syllable words   with /p,b,m,w,t,d,n,s,l/ in initial position 80% of presentations.     Revise:  Pt will produced /p,b,m,w,t,d,n,s,l/, in isolation and single syllables provided visual cue, 80%        STG 6 w RE: 5/14/2024  Not Met Pt can produce all target phonemes in isolation, initial syllables with direct model and VC.       Pt will repeat familiar greetings and common exchanges provided a direct model, 80% of presentations.     Revise:     STG 6 w   "RE: 5/14/2024 Dialogue boxes: single words 15/17- 88% , + one sentence \" I don't want it.\"  Increase phrase length   SPOKEN LANG COMP  Pt will demonstrate understanding of complex sentences and short paragraphs with 80% accuracy to y/n questions.. STG 4 w  MET PN: 3/12/2024  Full length of paragraph: 80%     Consider increased accuracy to 90% and/or increased length of paragraph    Pt will follow 2 step commands with 4 components, 80% of presentations.      New:  STG 6 w  RE: 5/14/2024  Not met     2 step directions  2-step:   objects 100%  Adjectives 40%  First/then: 1/3 33%  Adjectives 0/3, 0%  2 adjectives 2/3, 66%  Before/after- in order: 2/3 66%  Before/after- reverse order 2/3 66%  Before/after + Adjectives: 1/3, 33%  Before/after + 2 Adjectives: 1/2, 50%  Before/after +3 Adjectives:; 0/2, 0%   SPOKEN LANG EXP  Provided a descriptive cue and a F6 pictures written words, pt will name the described item, 80% of trials, min A  STG 4 w  PN: 4/11/2024  MET 6/6 , 6/6, 6/6,  7/7, with word approximation 1-5 syllables.    STG 6 w  RE: 5/14/2024  Not Met  Mod A with use of word berg and sentence unscrambling    ORAL READING:  NEW         READING  Pt will match picture to word F4 90% of presentations.    New: Read phrase completion with F4 MC, 80%    STG 4 w RE: 5/14/2024  Not Met      3/12/2024  MET, revise to picture to phrase  67-70%       Pt will select picture/word for sentence completion, 80%    STG 6w  RE: 5/14/2024  Met  Reading Sentence completion, F2:  80%    STG 8 w RE: 5/14/2024  Not Met  Reading Comp of sentences, MC F3-8/10, min to mod A   Aphasia Severity Score      Goals Addressed                   This Visit's Progress     SLP Motor Goals          Goals Short-Long Time Frame Result Comment   MOTOR SPEECH  Current:4  Goal:5-6  SPOKEN LANG COMP  Current:5  Goal:  6  SPOKEN LANG EXP:  Current:4   Goal: 5  READING:   Current:4  Goal: 5 LTG 12w  Not Met    MOTOR SPEECH: 4  SPOKEN LANG COMP: 5  SPOKEN LANG " "EXP:4  READIN   Pt will improve Aphasia Severity Rating Scale from 2/5 to >/=3/5 LTG 12 w  Met  Aphasia Severity Rating: 3/5-          MOTOR SPEECH  Pt will produce 1-3 syllable words   with /p,b,m,w,t,d,n,s,l/ in initial position 80% of presentations.     Revise:  Pt will produced /p,b,m,w,t,d,n,s,l/, in isolation and single syllables provided visual cue, 80%    Pt will repeat an intelligible estimate to a familiar listener of multisyllabic words producing accurate syllable and vowel integrity, 80% of presentations.     STG 6 w RE: 2024  Not Met Pt can produce all target phonemes in isolation, initial syllables with direct model and VC.       Pt will repeat familiar greetings and common exchanges provided a direct model, 80% of presentations.     Revise: pt will state address: number, street and town, fading cues.     STG 6 w  RE: 2024 Dialogue boxes: single words 15/17- 88% , + one sentence \" I don't want it.\"  Increase phrase length   SPOKEN LANG COMP  Pt will demonstrate understanding of complex sentences and short paragraphs with 80% accuracy to y/n questions.. STG 4 w  MET PN: 3/12/2024  Full length of paragraph: 80%     Consider increased accuracy to 90% and/or increased length of paragraph    Pt will follow 2 step commands with 4 components, 80% of presentations.      New: follow 2 step directions identifying body parts, letters and numbers in 10's and 100's place value, 80% of presentations. STG 6 w PN: 2025  2 steps: 80%     SPOKEN LANG EXP  Provided a descriptive cue and a F6 pictures written words, pt will name the described item, 80% of trials, min A  STG 4 w  PN: 2024  MET  , , ,  , with word approximation 1-5 syllables.   Given simple picture cue pt will produce simple sentences of 3-4 words with simplified grammatical form and approximated content words to convey meaning, Min A, 80% of presentations.  STG 6 w PN: 2024  Continue    Min A with use of word berg " and sentence unscrambling    ORAL READING:  NEW  Pt will demonstrate oral reading approximations of 1-3 syllable words, 80% of presentations.    PN: 6/25/2024  Continue 1-2 syllable: 90%   READING  Pt will match picture to word F4 90% of presentations.    New: Read phrase completion with F4 MC, 80%    STG 4 w RE: 5/14/2024  Not Met      3/12/2024  MET, revise to picture to phrase  67-70%       Pt will select picture/word for sentence completion, 80%    STG 6w  RE: 5/14/2024  Met  Reading Sentence completion, F2:  80%   Pt will demonstrate reading comprehension of sentences to brief paragraph- answering multiple choice questions, 80% min A STG 8 w RE: 5/14/2024  Not Met  Reading Comp of sentences, MC F3-8/10, min to mod A   Aphasia Severity Score       3- The patient can discuss almost all everyday problems with little or no assistance.  Reduction of speech and/or comprehension, however, makes conversation about certain material difficult or impossible.        FCM MOTOR SPEECH  L4: In simple structured conversation with familiar communication partners, the individual can produce simple words and phrases intelligibly.  The individual usually requires moderate cueing in order to produce simple sentences intelligibly, although accuracy may vary.         FCM: Spoken Language Expression   L4: The individual is successfully able to initiate communication using spoken language in simple, structured conversation in routine daily activities with familiar communication partners.  The individual usually requires moderate cueing, but is able to demonstrate use of simple sentences (ie, semantics, syntax, and morphology) and rarely uses complex sentences/messages.        FCM: Spoken Language Comprehension  L5: The individual is able to understand communication in structured conversations with both familiar and unfamiliar communication partners.  The individual occasionally requires minimal cueing to understand more complex  sentences/messages.  The individual occasionally initiates the use of compensatory strategies when encountering difficulty.       FCM: READING  L5: The individual reads sentence-level material containing some complex words.  The individual occasionally requires minimal cueing to read more complex sentences and paragraph-level material.  The individual occasionally uses compensatory strategies.

## 2024-07-09 ENCOUNTER — HOSPITAL ENCOUNTER (OUTPATIENT)
Dept: SPEECH THERAPY | Age: 79
Setting detail: THERAPIES SERIES
Discharge: HOME | End: 2024-07-09
Attending: INTERNAL MEDICINE
Payer: MEDICARE

## 2024-07-09 DIAGNOSIS — R47.01 APHASIA: Primary | ICD-10-CM

## 2024-07-09 DIAGNOSIS — I63.9 CEREBROVASCULAR ACCIDENT (CVA), UNSPECIFIED MECHANISM (CMS/HCC): ICD-10-CM

## 2024-07-09 DIAGNOSIS — I63.512 ACUTE ISCHEMIC LEFT MCA STROKE (CMS/HCC): ICD-10-CM

## 2024-07-09 PROCEDURE — 92507 TX SP LANG VOICE COMM INDIV: CPT | Mod: GN,KX

## 2024-07-09 NOTE — OP SLP TREATMENT LOG
Goals Short-Long Time Frame Result Comment   MOTOR SPEECH  Current:4  Goal:5-6  SPOKEN LANG COMP  Current:5  Goal:  6  SPOKEN LANG EXP:  Current:4   Goal: 5  READING:   Current:4  Goal: 5 LTG 12w DC: 2024  Not Met  MOTOR SPEECH: 4  SPOKEN LANG COMP: 5  SPOKEN LANG EXP:4  READIN   Pt will improve Aphasia Severity Rating Scale from 2/5 to >/=3/5 LTG 12 w RE:2024  Met  Aphasia Severity Rating: 3/5-          MOTOR SPEECH  Pt will produce 1-3 syllable words   with /p,b,m,w,t,d,n,s,l/ in initial position 80% of presentations.     STG 6 w RE:2024  Dc Goal, revise         Pt can produce all target phonemes in isolation, initial syllables with direct model and VC.       Revise:   Pt will produced /p,b,m,w,t,d,n,s,l/, in isolation and single syllables provided visual cue, 80%   24 8 w DC: 2024  Met   Consistent with VC, /p,b,m,w,n,s,l/  Inconsistent with VC /t,d/   Pt will repeat an intelligible estimate to a familiar listener of multisyllabic words producing accurate syllable and vowel integrity, 80% of presentations.    24 8 w DC: 2024  Not Met   60% of presentations   Pt will repeat familiar greetings and common exchanges provided a direct model, 80% of presentations.        STG 6 w  RE: 2024  Dc goal/revise  Familiar greetings, phrases: 1/10 I'ly, approximations: 8/10   Revise: pt will state address: number, street and town, fading cues.    2024 8 w DC: 2024  Met Min A for street number.  Encourage to say each digit separately 6-2-3   SPOKEN LANG COMP  Pt will demonstrate understanding of complex sentences and short paragraphs with 80% accuracy to y/n questions.. STG 4 w PN:3/12/2024  MET Full length of paragraph: 80%     Consider increased accuracy to 90% and/or increased length of paragraph    Pt will follow 2 step commands with 4 components, 80% of presentations.      New: follow 2 step directions identifying body parts, letters and numbers in 10's and 100's place  value, 80% of presentations. STG 6 w  RE: 5/14/2024  Not met       DC: 7/9/2024  Met           1 step directions 100%   2 step directions 80%.      SPOKEN LANG EXP  Provided a descriptive cue and a F6 pictures written words, pt will name the described item, 80% of trials, min A  STG 4 w  PN: 4/11/2024  MET 6/6 , 6/6, 6/6,  7/7, with word approximation 1-5 syllables.   Given simple picture cue pt will produce simple sentences of 3-4 words with simplified grammatical form and approximated content words to convey meaning, Min A, 80% of presentations.  STG 6 w DC: 7/9/2024  Not Met Photo naming approximation: fading cues 9/10    Mod I with use of word berg and sentence unscrambling (Mod/Max A)  Build sentences:   1 pronoun, simple sentences  1 noun, Present Progressive  1 pronoun, Present Progressive  2 nouns, Non reversible- increased difficulty     ORAL READING:  NEW  Pt will demonstrate oral reading approximations of 1-3 syllable words, 80% of presentations.   5/14/2024  8 w  DC: 7/9/2024  Improving, not met    Naming of randomized single digits. 0,1,2,3,4,5,6,7,8,9, (I) with 0-3, min cues for 4-9      READING  Pt will match picture to word F4 90% of presentations.     New: Read phrase completion with F4 MC, 80%    STG 4 w RE: 5/14/2024 Met       MET         Pt will select picture/word for sentence completion, 80%    STG 6w  RE: 5/14/2024  Met  Reading Sentence completion, F2:  80%   Pt will demonstrate reading comprehension of sentences to brief paragraph- answering multiple choice questions, 80% min A STG 8 w DC: 7/9/2024  Met     Aphasia Severity Score       3- The patient can discuss almost all everyday problems with little or no assistance.  Reduction of speech and/or comprehension, however, makes conversation about certain material difficult or impossible.         FCM MOTOR SPEECH  L4: In simple structured conversation with familiar communication partners, the individual can produce simple words and phrases  intelligibly.  The individual usually requires moderate cueing in order to produce simple sentences intelligibly, although accuracy may vary.          FCM: Spoken Language Expression   L4: The individual is successfully able to initiate communication using spoken language in simple, structured conversation in routine daily activities with familiar communication partners.  The individual usually requires moderate cueing, but is able to demonstrate use of simple sentences (ie, semantics, syntax, and morphology) and rarely uses complex sentences/messages.         FCM: Spoken Language Comprehension  L5: The individual is able to understand communication in structured conversations with both familiar and unfamiliar communication partners.  The individual occasionally requires minimal cueing to understand more complex sentences/messages.  The individual occasionally initiates the use of compensatory strategies when encountering difficulty.        FCM: READING  L5: The individual reads sentence-level material containing some complex words.  The individual occasionally requires minimal cueing to read more complex sentences and paragraph-level material.  The individual occasionally uses compensatory strategies.

## 2024-07-09 NOTE — PROGRESS NOTES
Speech-Language Pathology Discharge      SLP DISCHARGE NOTE FOR OUTPATIENT THERAPY    Patient: Jennifer Sams MRN: [id number]  : 1945 79 y.o.  Referring Physician: Ney Aparicio,*  Date of Visit: 2024      Certification Dates: 24 through 24    Total Visit Count: 52     Diagnosis:   1. Aphasia    2. Cerebrovascular accident (CVA), unspecified mechanism (CMS/HCC)    3. Acute ischemic left MCA stroke (CMS/HCC)        Chief Complaints:   Chief Complaint   Patient presents with    Other     communication       Precautions:   Precautions Comments: aphasia,    TODAY'S VISIT:    Session Time:  Start Time: 1500  Stop Time: 1605  Time Calculation (min): 65 min   General Information - 24 1458          Session Details    Document Type discharge evaluation     Mode of Treatment individual therapy        General Information    Onset of Illness/Injury or Date of Surgery 23     Referring Physician Dr. Aparicio     History of present illness/functional impairment  is a 79 y.o. female who presented to [facility] on  after she was not seen by her friends for a few days.  Ms. Sams lives alone and when she was found by EMS she was confused and combative.  In the emergency department she was aphasic with right-sided weakness and hypertensive to the 190 systolic.  CT scan of the head revealed an acute infarct in left MCA with mild bleeding.  Echo showed an EF of 43% and a bubble study was negative.  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 Pt transferred to  [facility] on 2023 Principal problem  Acute ischemic left MCA stroke (CMS/HCC). PMH AAA, heart block s/p cardiac pacemaker, glaucoma, hyperlipidemia, coronary artery disease, She was evaluated by speech therapy and cleared for a soft and bite-size diet with moderately thick liquids.  2023:  Diet SB6 with mildly thick liquids (MT2)  2023: Upgraded to EC7 with thin liquids.   Pt discharged home with 24 hr care and home health services recieving speech therapy for motor speech and aphasia.  Transferred to outpatient and evaluated at [facility] on 8/18/2024.  Ms. Sams presents for her second re-eval at [facility] 2/8/2024..     Precautions Comments aphasia,     Limitations/Impairments other (see comments)     Patient/Family/Caregiver Comments/Observations Pt attended I'ly thi day. SLP educated on primary SLP's rec for d/c and need for new script for AAC assessment and intervention.                      Pain and Vitals - 07/09/24 1458          Pain Assessment    Currently in pain No/Denies                    SLP - 07/09/24 1458          Speech Therapy    Speech Therapy Specialty Traditional Neuro Program ST        SLP Frequency and Duration    Frequency of treatment 2 times/week     SLP Cert From 05/14/24     SLP Cert To 07/09/24     Date SLP POC was sent to provider 05/14/24     Signed SLP Plan of Care received?  Yes                       OBJECTIVE MEASUREMENTS/DATA:    OM: FCM/Voice/Neuro    Outcome Measures - 07/09/24 1508          Functional Communication Measures    FCM: Motor Speech 4-->Level 4     FCM: Reading 5-->Level 5     FCM: Spoken Language, Comprehension 5-->Level 5     FCM: Spoken Language, Expression 4-->Level 4                     Outcome Measures          2/8/2024    17:02 3/12/2024    20:12 5/14/2024    21:32 7/9/2024    15:08   SLP Outcome Measures   FCM: Motor Speech 4-->Level 4 4-->Level 4 4-->Level 4       approaching 5 4-->Level 4   FCM: Reading 4-->Level 4 4-->Level 4 5-->Level 5 5-->Level 5   FCM: Spoken Language, Comprehension 5-->Level 5 5-->Level 5 5-->Level 5 5-->Level 5   FCM: Spoken Language, Expression 4-->Level 4 4-->Level 4 4-->Level 4 4-->Level 4       Today's Treatment::    Education provided:  Yes: See treatment log for details of education provided  Methods: Discussion  Readiness: acceptance  Response: Demonstrated understanding  Educated the patient on  need for another script from MD for AAC assessment as well as ongoing ST to assess and train in use of AAC device to augment verbal communication.     Goals Short-Long Time Frame Result Comment   MOTOR SPEECH  Current:4  Goal:5-6  SPOKEN LANG COMP  Current:5  Goal:  6  SPOKEN LANG EXP:  Current:4   Goal: 5  READING:   Current:4  Goal: 5 LTG 12w DC: 2024  Not Met  MOTOR SPEECH: 4  SPOKEN LANG COMP: 5  SPOKEN LANG EXP:4  READIN   Pt will improve Aphasia Severity Rating Scale from 2/5 to >/=3/5 LTG 12 w RE:2024  Met  Aphasia Severity Rating: 3/5-          MOTOR SPEECH  Pt will produce 1-3 syllable words   with /p,b,m,w,t,d,n,s,l/ in initial position 80% of presentations.     STG 6 w RE:2024  Dc Goal, revise         Pt can produce all target phonemes in isolation, initial syllables with direct model and VC.       Revise:   Pt will produced /p,b,m,w,t,d,n,s,l/, in isolation and single syllables provided visual cue, 80%   24 8 w DC: 2024  Met   Consistent with VC, /p,b,m,w,n,s,l/  Inconsistent with VC /t,d/   Pt will repeat an intelligible estimate to a familiar listener of multisyllabic words producing accurate syllable and vowel integrity, 80% of presentations.    24 8 w DC: 2024  Not Met   60% of presentations   Pt will repeat familiar greetings and common exchanges provided a direct model, 80% of presentations.        STG 6 w  RE: 2024  Dc goal/revise  Familiar greetings, phrases: 1/10 I'ly, approximations: 8/10   Revise: pt will state address: number, street and town, fading cues.    2024 8 w DC: 2024  Met Min A for street number.  Encourage to say each digit separately 6-2-3   SPOKEN LANG COMP  Pt will demonstrate understanding of complex sentences and short paragraphs with 80% accuracy to y/n questions.. STG 4 w PN:3/12/2024  MET Full length of paragraph: 80%     Consider increased accuracy to 90% and/or increased length of paragraph    Pt will follow 2 step  commands with 4 components, 80% of presentations.      New: follow 2 step directions identifying body parts, letters and numbers in 10's and 100's place value, 80% of presentations. STG 6 w  RE: 5/14/2024  Not met       DC: 7/9/2024  Met           1 step directions 100%   2 step directions 80%.      SPOKEN LANG EXP  Provided a descriptive cue and a F6 pictures written words, pt will name the described item, 80% of trials, min A  STG 4 w  PN: 4/11/2024  MET 6/6 , 6/6, 6/6,  7/7, with word approximation 1-5 syllables.   Given simple picture cue pt will produce simple sentences of 3-4 words with simplified grammatical form and approximated content words to convey meaning, Min A, 80% of presentations.  STG 6 w DC: 7/9/2024  Not Met Photo naming approximation: fading cues 9/10    Mod I with use of word berg and sentence unscrambling (Mod/Max A)  Build sentences:   1 pronoun, simple sentences  1 noun, Present Progressive  1 pronoun, Present Progressive  2 nouns, Non reversible- increased difficulty     ORAL READING:  NEW  Pt will demonstrate oral reading approximations of 1-3 syllable words, 80% of presentations.   5/14/2024  8 w  DC: 7/9/2024  Improving, not met    Naming of randomized single digits. 0,1,2,3,4,5,6,7,8,9, (I) with 0-3, min cues for 4-9      READING  Pt will match picture to word F4 90% of presentations.     New: Read phrase completion with F4 MC, 80%    STG 4 w RE: 5/14/2024 Met       MET         Pt will select picture/word for sentence completion, 80%    STG 6w  RE: 5/14/2024  Met  Reading Sentence completion, F2:  80%   Pt will demonstrate reading comprehension of sentences to brief paragraph- answering multiple choice questions, 80% min A STG 8 w DC: 7/9/2024  Met     Aphasia Severity Score       3- The patient can discuss almost all everyday problems with little or no assistance.  Reduction of speech and/or comprehension, however, makes conversation about certain material difficult or impossible.          FCM MOTOR SPEECH  L4: In simple structured conversation with familiar communication partners, the individual can produce simple words and phrases intelligibly.  The individual usually requires moderate cueing in order to produce simple sentences intelligibly, although accuracy may vary.          FCM: Spoken Language Expression   L4: The individual is successfully able to initiate communication using spoken language in simple, structured conversation in routine daily activities with familiar communication partners.  The individual usually requires moderate cueing, but is able to demonstrate use of simple sentences (ie, semantics, syntax, and morphology) and rarely uses complex sentences/messages.         FCM: Spoken Language Comprehension  L5: The individual is able to understand communication in structured conversations with both familiar and unfamiliar communication partners.  The individual occasionally requires minimal cueing to understand more complex sentences/messages.  The individual occasionally initiates the use of compensatory strategies when encountering difficulty.        FCM: READING  L5: The individual reads sentence-level material containing some complex words.  The individual occasionally requires minimal cueing to read more complex sentences and paragraph-level material.  The individual occasionally uses compensatory strategies.              Goals:     Goals Addressed                   This Visit's Progress     SLP Motor Goals        Goals Short-Long Time Frame Result Comment   MOTOR SPEECH  Current:4  Goal:5-6  SPOKEN LANG COMP  Current:5  Goal:  6  SPOKEN LANG EXP:  Current:4   Goal: 5  READING:   Current:4  Goal: 5 LTG 12w DC: 2024  Not Met  MOTOR SPEECH: 4  SPOKEN LANG COMP: 5  SPOKEN LANG EXP:4  READIN   Pt will improve Aphasia Severity Rating Scale from 2/5 to >/=3/5 LTG 12 w RE:2024  Met  Aphasia Severity Rating: 3/5-          MOTOR SPEECH  Pt will produce 1-3 syllable words    with /p,b,m,w,t,d,n,s,l/ in initial position 80% of presentations.     STG 6 w RE:5/14/2024  Dc Goal, revise         Pt can produce all target phonemes in isolation, initial syllables with direct model and VC.       Revise:   Pt will produced /p,b,m,w,t,d,n,s,l/, in isolation and single syllables provided visual cue, 80%   5/14/24 8 w DC: 7/9/2024  Met   Consistent with VC, /p,b,m,w,n,s,l/  Inconsistent with VC /t,d/   Pt will repeat an intelligible estimate to a familiar listener of multisyllabic words producing accurate syllable and vowel integrity, 80% of presentations.    5/14/24 8 w DC: 7/9/2024  Not Met   60% of presentations   Pt will repeat familiar greetings and common exchanges provided a direct model, 80% of presentations.        STG 6 w  RE: 5/14/2024  Dc goal/revise    Revise: pt will state address: number, street and town, fading cues.    5/14/2024 8 w DC: 7/9/2024  Met Min A for street number.  Encourage to say each digit separately 6-2-3   SPOKEN LANG COMP  Pt will demonstrate understanding of complex sentences and short paragraphs with 80% accuracy to y/n questions.. STG 4 w PN:3/12/2024  MET Full length of paragraph: 80%     Consider increased accuracy to 90% and/or increased length of paragraph    Pt will follow 2 step commands with 4 components, 80% of presentations.      New: follow 2 step directions identifying body parts, letters and numbers in 10's and 100's place value, 80% of presentations. STG 6 w  RE: 5/14/2024  Not met       DC: 7/9/2024  Met           1 step directions 100%   2 step directions 80%.      SPOKEN LANG EXP  Provided a descriptive cue and a F6 pictures written words, pt will name the described item, 80% of trials, min A  STG 4 w  PN: 4/11/2024  MET 6/6 , 6/6, 6/6,  7/7, with word approximation 1-5 syllables.   Given simple picture cue pt will produce simple sentences of 3-4 words with simplified grammatical form and approximated content words to convey meaning, Min A, 80%  of presentations.  STG 6 w DC: 7/9/2024  Not Met Photo naming approximation: fading cues 9/10    Mod I with use of word berg and sentence unscrambling (Mod/Max A)  Build sentences:   1 pronoun, simple sentences  1 noun, Present Progressive  1 pronoun, Present Progressive  2 nouns, Non reversible- increased difficulty     ORAL READING:  NEW  Pt will demonstrate oral reading approximations of 1-3 syllable words, 80% of presentations.   5/14/2024  8 w  DC: 7/9/2024  Improving, not met    Naming of randomized single digits. 0,1,2,3,4,5,6,7,8,9, (I) with 0-3, min cues for 4-9      READING  Pt will match picture to word F4 90% of presentations.     New: Read phrase completion with F4 MC, 80%    STG 4 w RE: 5/14/2024 Met       MET         Pt will select picture/word for sentence completion, 80%    STG 6w  RE: 5/14/2024  Met  Reading Sentence completion, F2:  80%   Pt will demonstrate reading comprehension of sentences to brief paragraph- answering multiple choice questions, 80% min A STG 8 w DC: 7/9/2024  Met       Aphasia Severity Score       3- The patient can discuss almost all everyday problems with little or no assistance.  Reduction of speech and/or comprehension, however, makes conversation about certain material difficult or impossible.         FCM MOTOR SPEECH  L4: In simple structured conversation with familiar communication partners, the individual can produce simple words and phrases intelligibly.  The individual usually requires moderate cueing in order to produce simple sentences intelligibly, although accuracy may vary.          FCM: Spoken Language Expression   L4: The individual is successfully able to initiate communication using spoken language in simple, structured conversation in routine daily activities with familiar communication partners.  The individual usually requires moderate cueing, but is able to demonstrate use of simple sentences (ie, semantics, syntax, and morphology) and rarely uses  complex sentences/messages.         FCM: Spoken Language Comprehension  L5: The individual is able to understand communication in structured conversations with both familiar and unfamiliar communication partners.  The individual occasionally requires minimal cueing to understand more complex sentences/messages.  The individual occasionally initiates the use of compensatory strategies when encountering difficulty.        FCM: READING  L5: The individual reads sentence-level material containing some complex words.  The individual occasionally requires minimal cueing to read more complex sentences and paragraph-level material.  The individual occasionally uses compensatory strategies.

## 2024-07-12 ENCOUNTER — HOSPITAL ENCOUNTER (EMERGENCY)
Facility: HOSPITAL | Age: 79
Discharge: HOME | End: 2024-07-12
Attending: EMERGENCY MEDICINE
Payer: MEDICARE

## 2024-07-12 VITALS
BODY MASS INDEX: 17.43 KG/M2 | RESPIRATION RATE: 20 BRPM | DIASTOLIC BLOOD PRESSURE: 63 MMHG | TEMPERATURE: 97.3 F | WEIGHT: 115 LBS | HEART RATE: 66 BPM | HEIGHT: 68 IN | OXYGEN SATURATION: 99 % | SYSTOLIC BLOOD PRESSURE: 135 MMHG

## 2024-07-12 DIAGNOSIS — R11.2 NAUSEA AND VOMITING, UNSPECIFIED VOMITING TYPE: ICD-10-CM

## 2024-07-12 DIAGNOSIS — R19.7 DIARRHEA, UNSPECIFIED TYPE: ICD-10-CM

## 2024-07-12 DIAGNOSIS — R11.0 NAUSEA: Primary | ICD-10-CM

## 2024-07-12 LAB
ALBUMIN SERPL-MCNC: 4.7 G/DL (ref 3.5–5.7)
ALP SERPL-CCNC: 65 IU/L (ref 34–125)
ALT SERPL-CCNC: 11 IU/L (ref 7–52)
ANION GAP SERPL CALC-SCNC: 7 MEQ/L (ref 3–15)
AST SERPL-CCNC: 19 IU/L (ref 13–39)
BASOPHILS # BLD: 0.02 K/UL (ref 0.01–0.1)
BASOPHILS NFR BLD: 0.2 %
BILIRUB SERPL-MCNC: 0.6 MG/DL (ref 0.3–1.2)
BUN SERPL-MCNC: 24 MG/DL (ref 7–25)
CALCIUM SERPL-MCNC: 10 MG/DL (ref 8.6–10.3)
CHLORIDE SERPL-SCNC: 107 MEQ/L (ref 98–107)
CO2 SERPL-SCNC: 29 MEQ/L (ref 21–31)
CREAT SERPL-MCNC: 1 MG/DL (ref 0.6–1.2)
DIFFERENTIAL METHOD BLD: ABNORMAL
EGFRCR SERPLBLD CKD-EPI 2021: 57.4 ML/MIN/1.73M*2
EOSINOPHIL # BLD: 0.04 K/UL (ref 0.04–0.36)
EOSINOPHIL NFR BLD: 0.3 %
ERYTHROCYTE [DISTWIDTH] IN BLOOD BY AUTOMATED COUNT: 12.6 % (ref 11.7–14.4)
GLUCOSE SERPL-MCNC: 108 MG/DL (ref 70–99)
HCT VFR BLD AUTO: 50.3 % (ref 35–45)
HGB BLD-MCNC: 16.3 G/DL (ref 11.8–15.7)
IMM GRANULOCYTES # BLD AUTO: 0.03 K/UL (ref 0–0.08)
IMM GRANULOCYTES NFR BLD AUTO: 0.3 %
LIPASE SERPL-CCNC: 51 U/L (ref 11–82)
LYMPHOCYTES # BLD: 0.84 K/UL (ref 1.2–3.5)
LYMPHOCYTES NFR BLD: 7.2 %
MAGNESIUM SERPL-MCNC: 1.9 MG/DL (ref 1.8–2.5)
MCH RBC QN AUTO: 31.5 PG (ref 28–33.2)
MCHC RBC AUTO-ENTMCNC: 32.4 G/DL (ref 32.2–35.5)
MCV RBC AUTO: 97.3 FL (ref 83–98)
MONOCYTES # BLD: 0.83 K/UL (ref 0.28–0.8)
MONOCYTES NFR BLD: 7.2 %
NEUTROPHILS # BLD: 9.84 K/UL (ref 1.7–7)
NEUTS SEG NFR BLD: 84.8 %
NRBC BLD-RTO: 0 %
PDW BLD AUTO: 9.1 FL (ref 9.4–12.3)
PLATELET # BLD AUTO: 185 K/UL (ref 150–369)
POTASSIUM SERPL-SCNC: 3.9 MEQ/L (ref 3.5–5.1)
PROT SERPL-MCNC: 7.6 G/DL (ref 6–8.2)
RBC # BLD AUTO: 5.17 M/UL (ref 3.93–5.22)
SODIUM SERPL-SCNC: 143 MEQ/L (ref 136–145)
WBC # BLD AUTO: 11.6 K/UL (ref 3.8–10.5)

## 2024-07-12 PROCEDURE — 36415 COLL VENOUS BLD VENIPUNCTURE: CPT | Performed by: EMERGENCY MEDICINE

## 2024-07-12 PROCEDURE — 99284 EMERGENCY DEPT VISIT MOD MDM: CPT | Mod: 25

## 2024-07-12 PROCEDURE — 83735 ASSAY OF MAGNESIUM: CPT

## 2024-07-12 PROCEDURE — 93005 ELECTROCARDIOGRAM TRACING: CPT

## 2024-07-12 PROCEDURE — 96360 HYDRATION IV INFUSION INIT: CPT

## 2024-07-12 PROCEDURE — 85025 COMPLETE CBC W/AUTO DIFF WBC: CPT | Performed by: EMERGENCY MEDICINE

## 2024-07-12 PROCEDURE — 83690 ASSAY OF LIPASE: CPT

## 2024-07-12 PROCEDURE — 80053 COMPREHEN METABOLIC PANEL: CPT | Performed by: EMERGENCY MEDICINE

## 2024-07-12 PROCEDURE — 3E0337Z INTRODUCTION OF ELECTROLYTIC AND WATER BALANCE SUBSTANCE INTO PERIPHERAL VEIN, PERCUTANEOUS APPROACH: ICD-10-PCS | Performed by: EMERGENCY MEDICINE

## 2024-07-12 PROCEDURE — 25800000 HC PHARMACY IV SOLUTIONS

## 2024-07-12 RX ORDER — ONDANSETRON HYDROCHLORIDE 2 MG/ML
4 INJECTION, SOLUTION INTRAVENOUS ONCE
Status: DISCONTINUED | OUTPATIENT
Start: 2024-07-12 | End: 2024-07-12 | Stop reason: HOSPADM

## 2024-07-12 RX ADMIN — SODIUM CHLORIDE 500 ML: 9 INJECTION, SOLUTION INTRAVENOUS at 11:37

## 2024-07-12 ASSESSMENT — ENCOUNTER SYMPTOMS
FEVER: 0
SHORTNESS OF BREATH: 0
MYALGIAS: 0
VOMITING: 1
DYSURIA: 0
CHEST TIGHTNESS: 0
ABDOMINAL PAIN: 0
FATIGUE: 1
SINUS PRESSURE: 0
APPETITE CHANGE: 0
CHILLS: 0
NAUSEA: 1
FACIAL SWELLING: 0
SINUS PAIN: 0
FLANK PAIN: 0
WEAKNESS: 1
SORE THROAT: 0
LIGHT-HEADEDNESS: 0
NECK STIFFNESS: 0
COUGH: 0
DIZZINESS: 0
FREQUENCY: 0
ABDOMINAL DISTENTION: 0
BLOOD IN STOOL: 0
DIARRHEA: 1
DIFFICULTY URINATING: 0
NECK PAIN: 0
HEMATURIA: 0
HEADACHES: 0

## 2024-07-12 NOTE — ED ATTESTATION NOTE
Procedures  Physical Exam  Review of Systems    7/12/202410:31 AM  I have personally seen and examined the patient.  I personally performed the key   components of the encounter and provided a substantive portion of the care and   medical decision making for this patient.     I have reviewed and agree with the PA/NP/Resident's assessment and ED plan of care, with any exceptions as documented below.  My examination, assessment and plan of care of Jennifer Sams is as follows:    Patient is a 79-year-old female who presents with diarrhea and generalized weakness, patient did have some nausea and vomiting but that has resolved, patient was out to dinner last night and had a cheeseburger and some fries, she was with a friend who had the same meal and no other person was sick, the diarrhea is reported as watery, no blood reported, patient has no abdominal discomfort, no fevers are reported, no history of inflammatory bowel disorders or prior GI problems, no recent travel outside the area and no known sick contacts    Exam:   Vital signs have been reviewed, pulse ox is 97% on room air, normal    Heart: RRR, normal S1/S2  Lungs: CTA bilaterally  Abdo: soft, non-tender    Plan/Medical Decision Making: Patient is a 79-year-old female who presents with diarrhea and generalized weakness, checking labs and IV fluids, reevaluate afterwards      This document was created using Dragon Dictation software. There might be some typographical errors due to this technology.          Godshall, Duane K, MD  07/12/24 9207

## 2024-07-12 NOTE — DISCHARGE INSTRUCTIONS
Your blood work was not concerning for a dangerous cause of your symptoms. Make sure to drink fluids such as gatorade or pedialyte to stay hydrated.

## 2024-07-12 NOTE — ED PROVIDER NOTES
Emergency Medicine Note  HPI   HISTORY OF PRESENT ILLNESS     HPI    79 year old female pmhx stroke, HTN, HLD, CAD, HFrEF (45%), T2 AV block on pacemaker presenting with nausea, vomiting, diarrhea, generalized weakness.  Patient was normal state of health yesterday.  Was seen by her friend last at 7 PM.  Some point overnight she had multiple episodes of watery diarrhea without blood or melena, and 2 episodes of vomiting without blood.  She denies abdominal pain, back pain, chest pain, shortness of breath, dysuria, hematuria, urinary frequency or urgency, fevers or chills.    She ate a hamburger yesterday at a restaurant with her friend who also ate a hamburger and did not get sick.  She has no history of abdominal surgeries.      Patient History   PAST HISTORY     Reviewed from Nursing Triage:       Past Medical History:   Diagnosis Date    AAA (abdominal aortic aneurysm) (CMS/[facility])     Arthritis     Elevated blood pressure reading without diagnosis of hypertension 04/19/2019    Epistaxis 01/06/2020    GERD (gastroesophageal reflux disease) 04/19/2019    Glaucoma 04/19/2019    Heart murmur     Hiatal hernia     History of hip replacement, total, right 04/19/2019    Right hip 9/ 2016 and revision 2017     History of rheumatic fever as a child 04/19/2019    Hypercholesterolemia 04/19/2019    Ischemic cardiomyopathy 05/09/2019    Kidney cysts     MI (myocardial infarction) (CMS/[facility])     Osteoarthritis of multiple joints 04/19/2019    Osteoporosis     Pacemaker 12/09/2019    Raynaud's disease 04/19/2019    RSD (reflex sympathetic dystrophy) 04/19/2019    Of left foot    SOB (shortness of breath) 12/10/2019    Status post insertion of drug eluting coronary artery stent 05/09/2019    Stroke (CMS/[facility])        Past Surgical History:   Procedure Laterality Date    CHOLECYSTECTOMY OPEN      CORONARY ANGIOPLASTY WITH STENT PLACEMENT  04/29/2019    of LAD    CORONARY ANGIOPLASTY WITH STENT PLACEMENT  04/30/2019    of RCA     DILATION AND CURETTAGE OF UTERUS      EYE SURGERY      RIGHT CATARACT    FOOT SURGERY Left     JOINT REPLACEMENT Right 09/2016    total hip    REVISION TOTAL HIP ARTHROPLASTY Right 2017    TONSILLECTOMY         Family History   Problem Relation Age of Onset    Congenital heart disease Biological Mother         noted at autopsy    Pulmonary fibrosis Biological Father     Heart attack Paternal Grandfather        Social History     Tobacco Use    Smoking status: Never    Smokeless tobacco: Never   Vaping Use    Vaping Use: Never used   Substance Use Topics    Alcohol use: Not Currently    Drug use: No         Review of Systems   REVIEW OF SYSTEMS     Review of Systems   Constitutional:  Positive for fatigue. Negative for appetite change, chills and fever.   HENT:  Negative for drooling, facial swelling, sinus pressure, sinus pain and sore throat.    Respiratory:  Negative for cough, chest tightness and shortness of breath.    Cardiovascular:  Negative for chest pain.   Gastrointestinal:  Positive for diarrhea, nausea and vomiting. Negative for abdominal distention, abdominal pain and blood in stool.   Genitourinary:  Negative for difficulty urinating, dysuria, flank pain, frequency, hematuria and urgency.   Musculoskeletal:  Negative for gait problem, myalgias, neck pain and neck stiffness.   Neurological:  Positive for weakness. Negative for dizziness, syncope, light-headedness and headaches.         VITALS     ED Vitals      Date/Time Temp Pulse Resp BP SpO2 [facility]   07/12/24 0947 36.3 °C (97.3 °F) 65 16 128/83 97 % LTE                         Physical Exam   PHYSICAL EXAM     Physical Exam  Constitutional:       General: She is not in acute distress.     Appearance: She is not ill-appearing or toxic-appearing.   HENT:      Head: Normocephalic.      Nose: Nose normal.      Mouth/Throat:      Pharynx: Oropharynx is clear.   Eyes:      Extraocular Movements: Extraocular movements intact.      Conjunctiva/sclera:  Conjunctivae normal.      Pupils: Pupils are equal, round, and reactive to light.   Cardiovascular:      Rate and Rhythm: Normal rate and regular rhythm.      Pulses: Normal pulses.   Pulmonary:      Effort: Pulmonary effort is normal.      Breath sounds: Normal breath sounds.   Abdominal:      General: There is no distension.      Palpations: Abdomen is soft.      Tenderness: There is no abdominal tenderness. There is no right CVA tenderness, left CVA tenderness, guarding or rebound.   Skin:     General: Skin is warm and dry.      Capillary Refill: Capillary refill takes less than 2 seconds.      Findings: No erythema or rash.   Neurological:      Mental Status: She is alert and oriented to person, place, and time. Mental status is at baseline.      Coordination: Coordination normal.      Comments: Left sided UE weakness and LE sensory deficit at baseline from previous stroke.            PROCEDURES     Procedures     DATA     Results       None            Imaging Results    None         No orders to display       Scoring tools                                  ED Course & MDM   MDM / ED COURSE / CLINICAL IMPRESSION / DISPO   79-year-old female presenting with weakness in the setting of vomiting and diarrhea's morning.  She is afebrile vital signs are within normal limits.  She is a mild leukocytosis at 11.  Her abdominal exam is benign without concerns for acute abdominal pathology.  She does not have any significant electrolyte abnormalities.  Lipase is within normal limits.  Transaminase levels are within normal limits.  She was given 500 cc bolus of normal saline for presumed dehydration in the setting of vomiting and diarrhea.  Symptoms most likely a viral gastric illness.  Imaging was not pursued as patient had normal vital signs and no tenderness on exam and had no active complaints, and normal lipase and hepatic levels.  Patient was able to tolerate p.o. in the emergency department.  Patient and her power of   were comfortable with discharge home.  Return precautions given.    Medical Decision Making  Amount and/or Complexity of Data Reviewed  Labs: ordered.  ECG/medicine tests: ordered.    Risk  Prescription drug management.           Clinical Impression      None                 Eliu Flores MD  Resident  07/12/24 1472

## 2024-07-13 LAB
ATRIAL RATE: 65
P AXIS: 63
PR INTERVAL: 204
QRS DURATION: 186
QT INTERVAL: 508
QTC CALCULATION(BAZETT): 528
R AXIS: -53
T WAVE AXIS: 101
VENTRICULAR RATE: 65

## 2024-07-19 ENCOUNTER — TELEPHONE (OUTPATIENT)
Dept: NEUROLOGY | Facility: CLINIC | Age: 79
End: 2024-07-19
Payer: MEDICARE

## 2024-07-22 NOTE — TELEPHONE ENCOUNTER
I spoke with Thelma. She is going to request the script with patient's PCP. The script is to use the AAC (Augmented and Alternative Communication) device.

## 2024-07-22 NOTE — TELEPHONE ENCOUNTER
I do not know what that is and not sure I can help with, can they talk to the primary care ?     Ney

## 2024-07-23 ENCOUNTER — TRANSCRIBE ORDERS (OUTPATIENT)
Dept: NEUROLOGY | Facility: CLINIC | Age: 79
End: 2024-07-23
Payer: MEDICARE

## 2024-07-23 DIAGNOSIS — I63.9 CARDIOEMBOLIC STROKE (CMS/HCC): Primary | ICD-10-CM

## 2024-07-31 ENCOUNTER — HOSPITAL ENCOUNTER (OUTPATIENT)
Dept: SPEECH THERAPY | Age: 79
Setting detail: THERAPIES SERIES
Discharge: HOME | End: 2024-07-31
Attending: INTERNAL MEDICINE
Payer: MEDICARE

## 2024-07-31 DIAGNOSIS — I63.9 CEREBROVASCULAR ACCIDENT (CVA), UNSPECIFIED MECHANISM (CMS/HCC): Primary | ICD-10-CM

## 2024-07-31 DIAGNOSIS — R47.01 APHASIA: ICD-10-CM

## 2024-07-31 DIAGNOSIS — I63.9 CARDIOEMBOLIC STROKE (CMS/HCC): ICD-10-CM

## 2024-07-31 PROCEDURE — 92605 EX FOR NONSPEECH DEVICE RX: CPT | Mod: GN

## 2024-07-31 NOTE — LETTER
Dear DR. Aparicio,    Thank you for this referral. Please review the attached notes and plan of care for your approval.  Please contact our department with any questions.     Sincerely,     Magnolia Lundy, CCC-SLP  [address]  [address]  [facility] [address]  Fax  [phone]    By co-signing this Plan of Care (POC) you agree to the following:  I have reviewed the the Plan of Care established by the therapist within this document and certify that the services are skilled and medically necessary. I have reviewed the plan and recommend that these services continue to meet the goals stated in this document.    PHYSICIAN SIGNATURE: __________________________________     DATE: ___________________  TIME: _____________           Speech Therapy Plan of Care 24   Effective from: 2024  Effective to: 10/30/2024    Plan ID: [id number]                Participants as of Finalize on 2024      Name Type Comments Contact Info    Ney Aparicio MD Referring Provider  [phone]    Magnolia Lundy CCC-SLP Speech Language Pathologist             Last Progress Notes Note       Author: Magnolia Lundy CCC-SLP Status: Signed Last edited: 2024  3:00 PM         Speech-Language Pathology Evaluation    [facility] Fax: [phone]    SLP EVALUATION FOR OUTPATIENT THERAPY    Patient: Jennifer Sams     MRN: [id number]  : 1945 79 y.o.    Referring Physician: Ney Aparicio,*  Date of Visit: 2024    Certification Dates:  24 through 10/30/24    Recommended Frequency & Duration:  2 times/week for up to 3 months   PN: 2024    Diagnosis:   1. Cerebrovascular accident (CVA), unspecified mechanism (CMS/HCC)    2. Cardioembolic stroke (CMS/HCC)    3. Aphasia        Chief Complaints:   Chief Complaint   Patient presents with   • Speech Problem     Significantly reduced ability to communicate within environment due to mixed receptive and expressive aphasia,  motor speech disorder       Precautions: Precautions Comments: aphasia,    Past Medical History:   Past Medical History:   Diagnosis Date   • AAA (abdominal aortic aneurysm) (CMS/[facility])    • Arthritis    • Elevated blood pressure reading without diagnosis of hypertension 04/19/2019   • Epistaxis 01/06/2020   • GERD (gastroesophageal reflux disease) 04/19/2019   • Glaucoma 04/19/2019   • Heart murmur    • Hiatal hernia    • History of hip replacement, total, right 04/19/2019    Right hip 9/ 2016 and revision 2017    • History of rheumatic fever as a child 04/19/2019   • Hypercholesterolemia 04/19/2019   • Ischemic cardiomyopathy 05/09/2019   • Kidney cysts    • MI (myocardial infarction) (CMS/[facility])    • Osteoarthritis of multiple joints 04/19/2019   • Osteoporosis    • Pacemaker 12/09/2019   • Raynaud's disease 04/19/2019   • RSD (reflex sympathetic dystrophy) 04/19/2019    Of left foot   • SOB (shortness of breath) 12/10/2019   • Status post insertion of drug eluting coronary artery stent 05/09/2019   • Stroke (CMS/[facility])        Past Surgical History:   Past Surgical History:   Procedure Laterality Date   • CHOLECYSTECTOMY OPEN     • CORONARY ANGIOPLASTY WITH STENT PLACEMENT  04/29/2019    of LAD   • CORONARY ANGIOPLASTY WITH STENT PLACEMENT  04/30/2019    of RCA   • DILATION AND CURETTAGE OF UTERUS     • EYE SURGERY      RIGHT CATARACT   • FOOT SURGERY Left    • JOINT REPLACEMENT Right 09/2016    total hip   • REVISION TOTAL HIP ARTHROPLASTY Right 2017   • TONSILLECTOMY         LEARNING ASSESSMENT    Assessment completed: Yes    Learner name:  Jennifer Sams    Learner: Patient    Learning Barriers:  Learning barriers: Reading, Language, Visual, Hearing, Emotional, and Cognitive    Preferred Language: English     Needed: No    Education Provided:   Method: Discussion, Handout, Demonstration, and Video  Readiness: acceptance  Response: Needs reinforcement      CO-LEARNER ASSESSMENT:    Completed: Yes:    Co-Learner name:  Thelma Sheppard    Learner: Other Healthcare POA    Learning Barriers:  Learning barriers: No Barriers    Preferred Language: English     Needed: No    Education Provided:   Method:  Discussion, Handout, and Demonstration  Readiness:   acceptance and eager  Response:   Demonstrated understanding, Verbalizes understanding, and Pt demonstrated hands-on understanding use of device  Comments: Participated in educational session with [employer] representative.  Explored device online prior to session      Welcome letter discussed: Yes Patient provided with Welcome Letter, which includes attendance policy. Provided education regarding cancellation and no-show policy. Education regarding the importance of participation and regular attendance to maximize goal attainment.     OBJECTIVE MEASUREMENTS/DATA:    Session Time:  Start Time: 1505 (sign on with [place])  Stop Time: 1615  Time Calculation (min): 70 min   Assessment - 07/31/24 1636          Assessment    Plan of Care reviewed and patient/family in agreement Yes     Comments Reviewed plan of care for 12 months to address language through a SGD     Rehab Potential/Prognosis (SLP) good, to achieve stated therapy goals     Problem List Impaired communication     Demonstrates Need for Referral to Another Service other (see comments);ENT physician     Actions taken vision and hearing evaluations.     Clinical Assessment Ms. 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 Evaluation for appropriateness for electronic AAC/SGD indicate that Ms. Sams is a likely candidate for a SGD to supplement and continue with remediation of communication limitations.  Screening of motor access, hearing  and vision, literacy and present reading skills, as well as present levels of cognition, language comprehension and expression support the likeliness and benefit of a SGD  to communicate with friends, healthcare providers, community members, and neighbors in home, medical facilities, community and via telephone.  Skilled communication therapy will benefit Ms. Sams to address simple to moderate in complexity use of the device over the next 3 months to orient and familiarize to the electronic communication device.     Plan and Recommendations Initiate goals: basic use.     Planned Services [facility] [address] Speech Lang Voice Comm and/or Auditory Proc (Treatment of speech, language, voice, communication and/or hearing processing disorder)                    General Information - 07/31/24 2038          Session Details    Document Type initial evaluation     Mode of Treatment individual therapy        General Information    Onset of Illness/Injury or Date of Surgery 06/26/23     Referring Physician Dr. Aparicio     History of present illness/functional impairment  is a 79 y.o. female who presented to [facility] on June 26 after she was not seen by her friends for a few days.  Ms. Sams lives alone and when she was found by EMS she was confused and combative.  In the emergency department she was aphasic with right-sided weakness and hypertensive to the 190 systolic.  CT scan of the head revealed an acute infarct in left MCA with mild bleeding.  Echo showed an EF of 43% and a bubble study was negative.  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 Pt transferred to  [facility] on 7/1/2023 Principal problem  Acute ischemic left MCA stroke (CMS/HCC). PMH AAA, heart block s/p cardiac pacemaker, glaucoma, hyperlipidemia, coronary artery disease,  Transferred to outpatient and evaluated at [facility] on 8/18/2024. Following spech and language therapy over the last 10 months Ms. Sams returns to speech therapy for a trial of an AAC device to aid in communication and further support language and speech remediation.     Precautions Comments aphasia,      Limitations/Impairments vision difficulty;difficulty hearing;safety/cognitive;other (see comments)   levels of anxiety    Interventions Language and speech impairment     Patient/Family/Caregiver Comments/Observations Pt attended with Thelma BECERRIL Barb stating that patient has been nervous and anxious about trialing the device over the last few days.  Pt and POA attended session with AAC device representative via [employer]        Hearing     Hearing Status hearing impairment, bilaterally   audiology evaluation recommended.                   Pain and Vitals - 07/31/24 1841          Pain Assessment    Currently in pain No/Denies                    SLP - 07/31/24 1622          Speech Therapy    Speech Therapy Specialty Traditional Neuro Program ST   AAC in conjuction with language       SLP Frequency and Duration    Frequency of treatment 2 times/week     Speech Duration 3 months     SLP Custom Frequency and Duration PN: 8/30/2024     SLP Cert From 07/31/24     SLP Cert To 10/30/24     Date SLP POC was sent to provider 07/31/24     Signed SLP Plan of Care received?  No                    Falls Assessment/Food Screening - 07/31/24 2038          Initial Falls Assessment    One or more falls in the last year No        Food Insecurity    Within the past 12 months, you worried that your food would run out before you got the money to buy more. Never true     Within the past 12 months, the food you bought just didn't last and you didn't have money to get more. Never true                     Living Environment    Living Environment - 07/31/24 2038          Living Environment    People in Home alone   [employer]    Living Environment Comment 2SH, B/B 2nd flr-tub shower with shower chair and grabbars in shower, FF stairs to 2nd flr w/L ascending rail, no DE.  no DARÍO. Caregiver 2 times per week for 4 hours per day who assists with home management and socialization. Transportation from [facility] and provides assistance with managing care.      Transportation Concerns other (see comments)   depends on rides from friends                  PLOF    Prior Level of Function - 07/31/24 2038          OTHER    Previous level of function Independent ADL/IADLs, driving, lives alone, completed finances, retired  to president of company                    07/31/24 1841   Augmentative/Alternative Communication Assessment (AAC)   Additional Documentation Augmentative/Alternative Communication (AAC) (Group)   Augmentative/Alternative Communication (AAC)   Intervention (AAC) multimodal communication training;high tech AAC device training   Communication Device/Method Used (AAC) electronic device   Gesture Mode (AAC) hand gestures;head nods/shakes;pointing   Comment, Intervention (AAC) Pt and POA will benefit from continued training on electronic AAC device.   Level of Cueing Required (AAC) maximum cueing;requires extra time to respond;requires repetition of questions/directions;requires verbal/visual cues;requires gestural prompt;requires choices to be provided;modeling/imitation needed   Comment, Assessment (AAC) Pt required max assistance to explore AAC device.   Level of Communication (AAC) yes/no response;basic needs/wants;makes choices;single words;phrases   Hand Gestures (AAC) used independently   Head Nods and Shakes, Gestures (AAC) used independently   Pointing, Gestures (AAC) used independently       Language Assessment     Language Assessment - 08/01/24 1141          Verbal Expression    Automatic Speech (Verbal Expression) unable/difficult to assess;alphabet;counting;days of the week;months of the year     Alphabet, Automatic Speech (Verbal Expression) achieved in unison;impaired     Counting, Automatic Speech (Verbal Expression) achieved in unison;impaired     Days of the Week, Automatic Speech (Verbal Expression) achieved in unison;unable/difficult to assess;impaired     Months of the Year, Automatic Speech (Verbal Expression) achieved in  unison;unable/difficult to assess;impaired     Confrontational Naming (Verbal Expression) unable/difficult to assess;body parts;objects;pictures     Body Parts, Confrontational Naming (Verbal Expression) achieved with phonemic cues;achieved with modeling;impaired     Objects, Confrontational Naming (Verbal Expression) impaired;achieved with phonemic cues;achieved with modeling;achieved with sentence completion cues     Pictures, Confrontational Naming (Verbal Expression) impaired;achieved with phonemic cues;achieved with modeling;achieved with sentence completion cues     Word Finding Skills (Verbal Expression) unable/difficult to assess     Repetition Skills (Verbal Expression) words     Words, Repetition Skills (Verbal Expression) impaired;achieved with articulatory cues;achieved with multiple trials     Conversational Speech (Verbal Expression) unable/difficult to assess;phrase level     Phrase Level, Conversational Speech (Verbal Expression) severe impairment     Comment, Assessment (Verbal Expression) Expressive communication is successful to initiate communication using spoken language in simple, structured conversation in routine daily activities with familar communication partners.  Benefits from cuing, demonstrates simple sentences and fluent islands of speech.     Comment, Intervention (Verbal Expression) AAC consider personal information, basic needs, basic conversational needs, no more than 2 in depth, reduced visual field        Auditory Comprehension    Follows Commands (Auditory Comprehension) 2-step commands     2 Step, Follows Commands (Auditory Comprehension) 50-74% accuracy;achieved with cues     Yes/No Questions (Auditory Comprehension) simple/factual questions;biographical/personal questions;other (see comments)   Simple paragraph information    Simple/Factual Questions (Auditory Comprehension) 50-74% accuracy;achieved with repetition     Biographical/Personal Questions (Auditory Comprehension)  50-74% accuracy;achieved with cues     Paragraph Comprehension (Auditory Comprehension) --   simple paragraph: yes/no 50-74%    Object Identification (Auditory Comprehension) field of 4;other (see comments)   F6    Field of 4, Object Identification (Auditory Comprehension) 75-90% accuracy     Picture Identification (Auditory Comprehension) field of 4 pictures;multiple pictures;other (see comments)   up to F6    Field of 4, Picture Identification (Auditory Comprehension) 75-90% accuracy     Comment, Assessment (Auditory Comprehension) Accuracy may vary with increased anxiety response, and novel instrumentation and/or stimulus     Comment, Intervention (Auditory Comprehension) Will reduce visual field on device until pt feels more comfortable on increasing depth of response.                   Reading and Writing    Reading and Writing - 07/31/24 1841          Written Language    Written Expression (Written Language) unable/difficult to assess     Word Level, Written Expression (Written Language) impaired     Functional Tasks (Written Language) unable/difficult to assess     Comment, Assessment (Written Language) Pt demontrates some impairments of form of letters provided dominant impaired hand and non-dominant hand, with impaired letter choice and laborious motor facility. iPt exhibits a impaired use of right arm with some neglect observed.        Reading Comprehension    Oral Reading Ability (Reading Comprehension) word level;letter/symbol level     Sentence Level, Oral Reading Ability (Reading Comprehension) impaired;severe impairment     Word Level, Oral Reading Ability (Reading Comprehension) impaired;moderate impairment;severe impairment;achieved with cues     Letter/Symbol Level, Oral Reading Ability (Reading Comprehension) impaired;moderate impairment;severe impairment;achieved with cues     Comprehension Level (Reading Comprehension) sentence level tasks     Sentence Level, Comprehension Level (Reading  Comprehension) impaired;minimal impairment     Word, Comprehension Level (Reading Comprehension) impaired;minimal impairment     Functional Reading Tasks (Reading Comprehension) signs/labels     Signs/Labels, Functional Reading (Reading Comprehension) moderate impairment     Comment, Assessment (Reading Comprehension) Oral reading is more impaired than silent reading for comprehension due to motor speech impairments. Silently pt reads up to sentence level material containing some complex words, requiring minimal cueing to read more complex sentences and paragraph-level material with the ability to answer yes/no and multiple choice questions.     Comment, Intervention (Reading Comprehension) SGD should consider photos, pictures and/or symbols paired with words and phrases for icon selection                   Motor Speech   Cognition    Executive Function and Cognition - 08/01/24 1141          Executive Function Assessment    Executive Function Deficit abstract thinking;impulse control;information processing;insight/awareness of deficits;judgment;organization/sequencing;planning/decision-making;problem-solving/reasoning;self-monitoring/self-correction     Comment SGD should consider single depth response and increase as tolerated and EF and memory skills permit.        Orientation    Orientation Comments:  Pt will benefit from daily Orientation practice using SGD        Memory    Comments Further assessment and consideration with progression of Auditory comprehension and expressive language provided AAC device.        Attention    Behavioral Issues difficulty managing stress;overwhelmed easily;verbal outbursts;disinhibition     Focused Attention Observation: pt can be observed as restless, requires prompts,     Sustained Attention Observation: suspect some difficulty with concentration, attention may wander, may exhibit fatigue, due to comprehension deficits, may miss details,     Selective Attention May be easily  distracted     Alternating Attention May become overwhelmed, confused and difficulty dealing with multiple stimuli.        Cognition    Safety Deficit impulsivity;insight into deficits/self-awareness;safety precautions follow-through/compliance;problem-solving;moderate deficit                   OM: FCM/Voice/Neuro    Outcome Measures - 07/31/24 2038          Functional Communication Measures    FCM: Augmentative/Alternative Communication 1-->Level 1     FCM: Motor Speech 4-->Level 4     FCM: Reading 5-->Level 5     FCM: Spoken Language, Comprehension 5-->Level 5     FCM: Spoken Language, Expression 4-->Level 4                     TREATMENT PLAN:      INITIAL EVALUATION: Alternative Augmentative Communication (AAC)/Speech Generative Device (SGD)  FINE MOTOR/ACCESS: (as it relates to use of SGD)  Notes:  [x] Isolated digit motor control  [] Proprioceptive feedback  [x] Hand dominance:  [x]Right     []Left  [x] Active wrist extension or wrist support orthotic (approximately 20 degrees)  [x] Elbow flexion/extension to 90 degrees  [] Force calibration    MOBILITY:  Will a mount be needed? No  Wheelchair Make: N/A  Wheelchair Model: N/A  Notes:    HEARING & VISION: (history of impairment, SGD modifications that may be required)  Notes: Pt reports difficulty seeing icons on screen with and without glasses.  Recommend update vision evaluation    LITERACY:   Education Status:Grade 12  Functional Reading Level:   [] Non-reader  [] Single- word  [x]Sentence  [x]Paragraph  []Mixed    Notes: see reading section of evaluation      COGNITIVE ABILITIES: Client is able to...  [x] Learn new tasks, including basic device operations  [x]Maintain attention to the task at hand  []Navigate between pages with minimal prompting  [x]Locate symbols on a page  []Attend to the display  []Remember the locations of symbols  []Navigate between pages independently  []Recognize that the SGD can be used to communicate wants and needs.     Notes: see  cognitive section of evaluation    DAILY COMMUNICATION NEEDS:  Communicates with these partners:  []Spouse  []Parents/siblings  []Extended family  [x]Friends  [x]Healthcare provider  []Caregiver  []School staff  [x]Stranger  [x]Neighbor  [x]Community member  []Person who can not read  []Person with visual impairment  []Person with hearing impairment    Communication in these Environments:  [x]Home  [x]Medical facility  [x]Community  []Work  []School  []Support group  [x]Telephone    Communication in these situations:  [x]1:1 and small group conversations  []Large group events  []Family/social gatherings  [x]Telephone interactions      Communicate messages, convey ideas and participate in these activities:  [x]Express physical wants and needs  [x]Express needs/wants in emergencies  [x]Express feelings/frustrations appropriately  [x]Generate novel utterances  []Participate in decision making  []Participate in conversation  [x]Access medical care  [x]Share information    NON-SGD APPROACHES:  Notes: (low tech options trialed and whey they've been ruled out)    SGD FEATURES:  SGD features required by this client:  [x]Screen size: A screen size of    []<6 inches   [] 6-8 inches   [x]   9-12 inches    [] 13+ inches  is needed to balance the size of the SGD and what client is able to access.   [x] Lightweight, easy to carry  [x] Battery holds a charge throughout the day  [x] Protective case  [x] Dynamic display for navigation and ease of programming  [x] Voice Amplification  [x] Multiple ways to generate messages    Selection techniques used:   [x]  Direct (hands of feet)   []   keyguard  []   touchguide  [] Switch Scanning- how many switches  [] Joystick  [] Headpointing  [] Eye Gaze  [] Combination of methods    Describe: single digit selection    Language System  Method(s) of language representation:                                 Type of message formulation:  [] Spelling       [] letters  [x]Single meaning  pictures/categories. Word power   [x] Single words  [] Multiple meaning pictures/unity, LAMP Words for Life [x] phrases  []Visual scenes      [] Sentences    Features to promote language growth:  [] Morphological endings      [x] Word predictions  [x] Vocabulary builder       [x]Icon predictor  [] Hide/show keys       []Abbreviations expansion  [] Built in vocabulary progression     [x]Predictable vocabulary organization  [] Robust amount of pre-stored vocabulary    []Consistent motor plan for words   [x]Easy access to core vocabulary to support novel utterance      Required software features:   [x] Pronunciation exceptions  [x] Word finder  [] Built in language data analysis  [x]Camera for specific programming  [x] Custom button functions and appearance (font, size, background color, highlight options, action, magnification)  [x] Large symbol library  [x] Ability to customize vocabulary without device present (e.g. PASS, ChatEditor)      Selected Accessories:  []Eye gaze  []Head pointing  []Switches  []Joystick  []Keyguards  []Touchglides  []Extra symbols    GOAL BANK IDEAS:  Operational Competence  Powering the device on/off  Adjusting the volume  Accessing the information on the screen; visual field scanning, direct selection, etc.  Developing proficiency in using the system efficiently    Linguistic Competence  Understanding and using the language of the device, whether it contains symbolic or concrete icons, and/or written words  Using the device for a variety of communication functions: greeting, requesting, commenting, disagreeing, protesting, sharing information, reciting stories/songs/prayers, etc.  Using vernacular that is commonly expressed at home, with family, with friends, and at school/work  Combining words into phrases/sentences    Social Competence  Engaging in discourse: greeting, replying, turn-taking, introducing and maintaining a topic, changing a topic, ending a conversation, etc.  Connecting  through expression of wants and needs, information, questions, comments, protests, feelings, etc.    Strategic Competence  Repairing communication breakdown  Using the most effective communication method and vocabulary for the context of the situation  Using compensatory strategies within the AAC system for effective communication    Now, let’s review some examples of?AAC-related goals?for developing each communication competency in order to have an effective connection.  Operational Goals  Patient will increase communication effectiveness by adjusting the volume of the AAC device so that it is audible to the listener in quiet or in a noisier situation, independently, in 80% of opportunities.  Patient will increase communication effectiveness by selecting an icon from a category page to respond to a multiple-choice question, independently, with 90% accuracy.    Linguistic Goals  Patient will reduce medical risks by communicating medical information and physical symptoms with 80% accuracy.  Patient will reduce safety risks by requesting help to meet personal needs in 4 out of 5 opportunities.  Patient will participate in a conversation regarding a thematic activity/hobby, using a word or pre-programmed phrase with 80% accuracy.    Social Goals  Patient will reduce social isolation risks by using common social messages for greeting, introduction, and turn-taking in 4 out of 5 opportunities.  Patient will use the AAC device to engage in conversation via phone, with a friend/family member three times in one week.  Patient will use the AAC device to comment, ask questions, or provide opinions on a topic in 4 out of 5 opportunities.    Strategic Goals  Patient will increase communication effectiveness by clarifying information with 80% accuracy.  Patient will use [id number], white board, or direct icon selection to relay information or respond to a question with 80% accuracy.       GOALS:     Goals          Patient  Stated    •  patient stated (pt-stated)       POA: supplement communication  Pt: improve speech and language production.          Other    •  Language- AAC Goal          Long Term Goals   Time Frame  Result  Comment/Progress    Pt will improve FCM in the following areas:  Augmentative/Alternative Communication  Current: 1  Goal: 3-4   12           EXPANDING VOCABULARY  The pt will use the AAC device to expand their functional vocabulary, adding and using 10 new words or phrases appropriately in daily communication  12        SOCIAL INTERRACTION  The pt will use the SGDdevice to participate in social interactions, such as greeting peers, asking questions, and making comments, in 80% of observed opportunities over a 3  month period  12        SIMPLE PHRASE CONSTRUCTION  The pt will use the SGD device to construct and communicate simple phrases in 70% of appropriate contexts  12        INDEPENDENCE  The pt will use the AAC device independently in a variety of settings (home and community)for different purposes  (social, personal) with 90% success over a 3 month period  12        DAILY ROUTINES  Pt will use the SGD device to communicat during daily routines with minimal prompting in 80% of opportunities  12        REPAIRING COMMUNICATION BREAKDOWNS  Pt esequiel use the SGD device to repair communication breakdowns in 70% of instnaces when misunderstandings occur 12     Pt supports will demonstrate appropriate stratiegies to support pts use of the SGD device, such as providing wait time and modeling use, in 90% of observed interactions.  12     PEER TRAINING  Peers will engage in supported interactions using the AAC device showing undersandings and patience in 3 out of 4 observed opportunities.  12        Short Term Goals Time Frame Result Comment/Progress   POA will demonstrate Operational Competence- Supervision, pt with min A  Powering the device on/off  Adjusting the volume  Accessing the information on the screen; visual  field scanning, direct selection, proprioceptive feedback and forced calibration  Developing proficiency in using the system efficiently 4 w     INITIATE COMMUNICATION:  Pt will use the AAC device to initiate a conversation 3 out of 5 opportunities across natural and simulated settings, min A   (friends, healthcare provider, stranger, neighbor, member of community)  8 w     SYMBOL RECOGNITION  Pt will correctly identify and select symbols or icons on the AAC devcie for:  basic needs  Orientation  feelings/frustrations  novel responses  share information   with 80% accuracy, min A 8 w     LANGUAGE SYSTEMS   Pt will use single meaning folders and cards within TouchChat to communicate in single words, single meaning prepared scripts, and syntax phrases, min A.  12 w     IMPROVING ACCURACY  Pt will improve their accuracy in selecting the correct symbols on the SGD device by 20% over the next 3 months.  8 w                 FCM: Augmentative/Alternative Communication  L1: The individual attempts to communicate (eg. Gestures, pointing, communication board, electronic device, etc) However communication using augmentative-alternative communication is not meaningful to familiar or unfamiliar listeners at any time regardless of amount of cueing or assistance.                 EVALUATION AND ASSESSMENT:     Assessment - 07/31/24 1636          Assessment    Plan of Care reviewed and patient/family in agreement Yes     Comments Reviewed plan of care for 12 months to address language through a SGD     Rehab Potential/Prognosis (SLP) good, to achieve stated therapy goals     Problem List Impaired communication     Demonstrates Need for Referral to Another Service other (see comments);ENT physician     Actions taken vision and hearing evaluations.     Clinical Assessment Ms. 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 Evaluation for  appropriateness for electronic AAC/SGD indicate that Ms. Sams is a likely candidate for a SGD to supplement and continue with remediation of communication limitations.  Screening of motor access, hearing  and vision, literacy and present reading skills, as well as present levels of cognition, language comprehension and expression support the likeliness and benefit of a SGD to communicate with friends, healthcare providers, community members, and neighbors in home, medical facilities, community and via telephone.  Skilled communication therapy will benefit Ms. Sams to address simple to moderate in complexity use of the device over the next 3 months to orient and familiarize to the electronic communication device.     Plan and Recommendations Initiate goals: basic use.     Planned Services CPT 53922 Speech Lang Voice Comm and/or Auditory Proc (Treatment of speech, language, voice, communication and/or hearing processing disorder)                     JERARDO Price                       Current Participants as of 8/2/2024      Name Type Comments Contact Info    Ney Aparicio MD Referring Provider  [phone]    Signature pending    Magnolia Lundy CCC-SLP Speech Language Pathologist      Electronically signed by JERARDO Cox at 8/2/2024 1154 EDT

## 2024-08-01 ENCOUNTER — TELEPHONE (OUTPATIENT)
Dept: REGISTRATION | Facility: CLINIC | Age: 79
End: 2024-08-01
Payer: MEDICARE

## 2024-08-01 NOTE — OP SLP TREATMENT LOG
INITIAL EVALUATION: Alternative Augmentative Communication (AAC)/Speech Generative Device (SGD)  FINE MOTOR/ACCESS: (as it relates to use of SGD)  Notes:  [x] Isolated digit motor control  [] Proprioceptive feedback  [x] Hand dominance:  [x]Right     []Left  [x] Active wrist extension or wrist support orthotic (approximately 20 degrees)  [x] Elbow flexion/extension to 90 degrees  [] Force calibration    MOBILITY:  Will a mount be needed? No  Wheelchair Make: N/A  Wheelchair Model: N/A  Notes:    HEARING & VISION: (history of impairment, SGD modifications that may be required)  Notes: Pt reports difficulty seeing icons on screen with and without glasses.  Recommend update vision evaluation    LITERACY:   Education Status:Grade 12  Functional Reading Level:   [] Non-reader  [] Single- word  [x]Sentence  [x]Paragraph  []Mixed    Notes: see reading section of evaluation      COGNITIVE ABILITIES: Client is able to...  [x] Learn new tasks, including basic device operations  [x]Maintain attention to the task at hand  []Navigate between pages with minimal prompting  [x]Locate symbols on a page  []Attend to the display  []Remember the locations of symbols  []Navigate between pages independently  []Recognize that the SGD can be used to communicate wants and needs.     Notes: see cognitive section of evaluation    DAILY COMMUNICATION NEEDS:  Communicates with these partners:  []Spouse  []Parents/siblings  []Extended family  [x]Friends  [x]Healthcare provider  []Caregiver  []School staff  [x]Stranger  [x]Neighbor  [x]Community member  []Person who can not read  []Person with visual impairment  []Person with hearing impairment    Communication in these Environments:  [x]Home  [x]Medical facility  [x]Community  []Work  []School  []Support group  [x]Telephone    Communication in these situations:  [x]1:1 and small group conversations  []Large group events  []Family/social gatherings  [x]Telephone interactions      Communicate  messages, convey ideas and participate in these activities:  [x]Express physical wants and needs  [x]Express needs/wants in emergencies  [x]Express feelings/frustrations appropriately  [x]Generate novel utterances  []Participate in decision making  []Participate in conversation  [x]Access medical care  [x]Share information    NON-SGD APPROACHES:  Notes: (low tech options trialed and whey they've been ruled out)    SGD FEATURES:  SGD features required by this client:  [x]Screen size: A screen size of    []<6 inches   [] 6-8 inches   [x]   9-12 inches    [] 13+ inches  is needed to balance the size of the SGD and what client is able to access.   [x] Lightweight, easy to carry  [x] Battery holds a charge throughout the day  [x] Protective case  [x] Dynamic display for navigation and ease of programming  [x] Voice Amplification  [x] Multiple ways to generate messages    Selection techniques used:   [x]  Direct (hands of feet)   []   keyguard  []   touchguide  [] Switch Scanning- how many switches  [] Joystick  [] Headpointing  [] Eye Gaze  [] Combination of methods    Describe: single digit selection    Language System  Method(s) of language representation:                                 Type of message formulation:  [] Spelling       [] letters  [x]Single meaning pictures/categories. Word power   [x] Single words  [] Multiple meaning pictures/unity, LAMP Words for Life [x] phrases  []Visual scenes      [] Sentences    Features to promote language growth:  [] Morphological endings      [x] Word predictions  [x] Vocabulary builder       [x]Icon predictor  [] Hide/show keys       []Abbreviations expansion  [] Built in vocabulary progression     [x]Predictable vocabulary organization  [] Robust amount of pre-stored vocabulary    []Consistent motor plan for words   [x]Easy access to core vocabulary to support novel utterance      Required software features:   [x] Pronunciation exceptions  [x] Word finder  [] Built in  language data analysis  [x]Camera for specific programming  [x] Custom button functions and appearance (font, size, background color, highlight options, action, magnification)  [x] Large symbol library  [x] Ability to customize vocabulary without device present (e.g. PASS, ChatEditor)      Selected Accessories:  []Eye gaze  []Head pointing  []Switches  []Joystick  []Keyguards  []Touchglides  []Extra symbols

## 2024-08-01 NOTE — TELEPHONE ENCOUNTER
need to schedule ST. wanted to clarify avail days/time. requested call back to [facility], pls transfer

## 2024-08-02 NOTE — ADDENDUM NOTE
Encounter addended by: Magnolia Lundy CCC-SLP on: 8/2/2024 11:59 AM   Actions taken: Clinical Note Signed

## 2024-08-02 NOTE — ADDENDUM NOTE
Encounter addended by: Magnolia Lundy CCC-SLP on: 8/2/2024 12:13 PM   Actions taken: Clinical Note Signed

## 2024-08-02 NOTE — PROGRESS NOTES
Speech-Language Pathology Evaluation    [facility] OP Therapy Fax: [phone]    SLP EVALUATION FOR OUTPATIENT THERAPY    Patient: Jennifer Sams     MRN: [id number]  : 1945 79 y.o.    Referring Physician: Ney Aparicio,*  Date of Visit: 2024    Certification Dates:  24 through 10/30/24    Recommended Frequency & Duration:  2 times/week for up to 3 months   PN: 2024    Diagnosis:   1. Cerebrovascular accident (CVA), unspecified mechanism (CMS/[facility])    2. Cardioembolic stroke (CMS/[facility])    3. Aphasia        Chief Complaints:   Chief Complaint   Patient presents with    Speech Problem     Significantly reduced ability to communicate within environment due to mixed receptive and expressive aphasia, motor speech disorder       Precautions: Precautions Comments: aphasia,    Past Medical History:   Past Medical History:   Diagnosis Date    AAA (abdominal aortic aneurysm) (CMS/[facility])     Arthritis     Elevated blood pressure reading without diagnosis of hypertension 2019    Epistaxis 2020    GERD (gastroesophageal reflux disease) 2019    Glaucoma 2019    Heart murmur     Hiatal hernia     History of hip replacement, total, right 2019    Right hip 2016 and revision 2017     History of rheumatic fever as a child 2019    Hypercholesterolemia 2019    Ischemic cardiomyopathy 2019    Kidney cysts     MI (myocardial infarction) (CMS/[facility])     Osteoarthritis of multiple joints 2019    Osteoporosis     Pacemaker 2019    Raynaud's disease 2019    RSD (reflex sympathetic dystrophy) 2019    Of left foot    SOB (shortness of breath) 12/10/2019    Status post insertion of drug eluting coronary artery stent 2019    Stroke (CMS/[facility])        Past Surgical History:   Past Surgical History:   Procedure Laterality Date    CHOLECYSTECTOMY OPEN      CORONARY ANGIOPLASTY WITH STENT PLACEMENT  2019    of LAD    CORONARY ANGIOPLASTY  WITH STENT PLACEMENT  04/30/2019    of RCA    DILATION AND CURETTAGE OF UTERUS      EYE SURGERY      RIGHT CATARACT    FOOT SURGERY Left     JOINT REPLACEMENT Right 09/2016    total hip    REVISION TOTAL HIP ARTHROPLASTY Right 2017    TONSILLECTOMY         LEARNING ASSESSMENT    Assessment completed: Yes    Learner name:  Jennifer Sams      Learner: Patient    Learning Barriers:  Learning barriers: Reading, Language, Visual, Hearing, Emotional, and Cognitive    Preferred Language: English     Needed: No    Education Provided:   Method: Discussion, Handout, Demonstration, and Video  Readiness: acceptance  Response: Needs reinforcement      CO-LEARNER ASSESSMENT:    Completed: Yes:   Co-Learner name:  Thelma Sheppard    Learner: Other POA    Learning Barriers:  Learning barriers: No Barriers    Preferred Language: English     Needed: No    Education Provided:   Method:  Discussion, Handout, Demonstration, and Video  Readiness:   acceptance  Response:   Demonstrated understanding and Verbalizes understanding  Comments: Attended session with Bradford rodriguez       Welcome letter discussed: Yes Patient provided with Welcome Letter, which includes attendance policy. Provided education regarding cancellation and no-show policy. Education regarding the importance of participation and regular attendance to maximize goal attainment.     OBJECTIVE MEASUREMENTS/DATA:    Session Time:  Start Time: 1505 (sign on with Bradford)  Stop Time: 1615  Time Calculation (min): 70 min   Assessment - 07/31/24 1636          Assessment    Plan of Care reviewed and patient/family in agreement Yes     Comments Reviewed plan of care for 12 months to address language through a SGD     Rehab Potential/Prognosis (SLP) good, to achieve stated therapy goals     Problem List Impaired communication     Demonstrates Need for Referral to Another Service other (see comments);ENT physician     Actions taken vision and hearing  evaluations.     Clinical Assessment Ms. 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 Evaluation for appropriateness for electronic AAC/SGD indicate that Ms. Sams is a likely candidate for a SGD to supplement and continue with remediation of communication limitations.  Screening of motor access, hearing  and vision, literacy and present reading skills, as well as present levels of cognition, language comprehension and expression support the likeliness and benefit of a SGD to communicate with friends, healthcare providers, community members, and neighbors in home, medical facilities, community and via telephone.  Skilled communication therapy will benefit Ms. Sams to address simple to moderate in complexity use of the device over the next 3 months to orient and familiarize to the electronic communication device.     Plan and Recommendations Initiate goals: basic use.     Planned Services CPT 30314 Speech Lang Voice Comm and/or Auditory Proc (Treatment of speech, language, voice, communication and/or hearing processing disorder)                    General Information - 07/31/24 2038          Session Details    Document Type initial evaluation     Mode of Treatment individual therapy        General Information    Onset of Illness/Injury or Date of Surgery 06/26/23     Referring Physician Dr. Aparicio     History of present illness/functional impairment  is a 79 y.o. female who presented to [facility] on June 26 after she was not seen by her friends for a few days.  Ms. Sams lives alone and when she was found by EMS she was confused and combative.  In the emergency department she was aphasic with right-sided weakness and hypertensive to the 190 systolic.  CT scan of the head revealed an acute infarct in left MCA with mild bleeding.  Echo showed an EF of 43% and a bubble study was negative.  Etiology of her stroke  was a localized apical aneurysm containing thrombus. Pt transferred to  [facility] on 7/1/2023 Principal problem  Acute ischemic left MCA stroke (CMS/HCC). PMH AAA, heart block s/p cardiac pacemaker, glaucoma, hyperlipidemia, coronary artery disease,  Transferred to outpatient and evaluated at [facility] on 8/18/2024. Following spech and language therapy over the last 10 months Ms. Sams returns to speech therapy for a trial of an AAC device to aid in communication and further support language and speech remediation.     Precautions Comments aphasia,     Limitations/Impairments vision difficulty;difficulty hearing;safety/cognitive;other (see comments)   levels of anxiety    Interventions Language and speech impairment     Patient/Family/Caregiver Comments/Observations Pt attended with Thelma BECERRIL Barb stating that patient has been nervous and anxious about trialing the device over the last few days.  Pt and POA attended session with AAC device representative via Z[employer]        Hearing     Hearing Status hearing impairment, bilaterally   audiology evaluation recommended.                   Pain and Vitals - 07/31/24 1841          Pain Assessment    Currently in pain No/Denies                    SLP - 07/31/24 1622          Speech Therapy    Speech Therapy Specialty Traditional Neuro Program ST   AAC in conjuction with language       SLP Frequency and Duration    Frequency of treatment 2 times/week     Speech Duration 3 months     SLP Custom Frequency and Duration PN: 8/30/2024     SLP Cert From 07/31/24     SLP Cert To 10/30/24     Date SLP POC was sent to provider 07/31/24     Signed SLP Plan of Care received?  No                    Falls Assessment/Food Screening - 07/31/24 2038          Initial Falls Assessment    One or more falls in the last year No        Food Insecurity    Within the past 12 months, you worried that your food would run out before you got the money to buy more. Never true     Within the past 12 months,  the food you bought just didn't last and you didn't have money to get more. Never true                     Living Environment    Living Environment - 07/31/24 2038          Living Environment    People in Home alone   [employer]    Living Environment Comment 2SH, B/B 2nd flr-tub shower with shower chair and grabbars in shower, FF stairs to 2nd flr w/L ascending rail, no IA.  no DARÍO. Caregiver 2 times per week for 4 hours per day who assists with home management and socialization. Transportation from Thelma and provides assistance with managing care.     Transportation Concerns other (see comments)   depends on rides from friends                  PLOF    Prior Level of Function - 07/31/24 2038          OTHER    Previous level of function Independent ADL/IADLs, driving, lives alone, completed finances, retired  to president of company                   Language Assessment     Language Assessment - 08/01/24 1141          Verbal Expression    Automatic Speech (Verbal Expression) unable/difficult to assess;alphabet;counting;days of the week;months of the year     Alphabet, Automatic Speech (Verbal Expression) achieved in unison;impaired     Counting, Automatic Speech (Verbal Expression) achieved in unison;impaired     Days of the Week, Automatic Speech (Verbal Expression) achieved in unison;unable/difficult to assess;impaired     Months of the Year, Automatic Speech (Verbal Expression) achieved in unison;unable/difficult to assess;impaired     Confrontational Naming (Verbal Expression) unable/difficult to assess;body parts;objects;pictures     Body Parts, Confrontational Naming (Verbal Expression) achieved with phonemic cues;achieved with modeling;impaired     Objects, Confrontational Naming (Verbal Expression) impaired;achieved with phonemic cues;achieved with modeling;achieved with sentence completion cues     Pictures, Confrontational Naming (Verbal Expression) impaired;achieved with phonemic  cues;achieved with modeling;achieved with sentence completion cues     Word Finding Skills (Verbal Expression) unable/difficult to assess     Repetition Skills (Verbal Expression) words     Words, Repetition Skills (Verbal Expression) impaired;achieved with articulatory cues;achieved with multiple trials     Conversational Speech (Verbal Expression) unable/difficult to assess;phrase level     Phrase Level, Conversational Speech (Verbal Expression) severe impairment     Comment, Assessment (Verbal Expression) Expressive communication is successful to initiate communication using spoken language in simple, structured conversation in routine daily activities with familar communication partners.  Benefits from cuing, demonstrates simple sentences and fluent islands of speech.     Comment, Intervention (Verbal Expression) AAC consider personal information, basic needs, basic conversational needs, no more than 2 in depth, reduced visual field        Auditory Comprehension    Follows Commands (Auditory Comprehension) 2-step commands     2 Step, Follows Commands (Auditory Comprehension) 50-74% accuracy;achieved with cues     Yes/No Questions (Auditory Comprehension) simple/factual questions;biographical/personal questions;other (see comments)   Simple paragraph information    Simple/Factual Questions (Auditory Comprehension) 50-74% accuracy;achieved with repetition     Biographical/Personal Questions (Auditory Comprehension) 50-74% accuracy;achieved with cues     Paragraph Comprehension (Auditory Comprehension) --   simple paragraph: yes/no 50-74%    Object Identification (Auditory Comprehension) field of 4;other (see comments)   F6    Field of 4, Object Identification (Auditory Comprehension) 75-90% accuracy     Picture Identification (Auditory Comprehension) field of 4 pictures;multiple pictures;other (see comments)   up to F6    Field of 4, Picture Identification (Auditory Comprehension) 75-90% accuracy     Comment,  Assessment (Auditory Comprehension) Accuracy may vary with increased anxiety response, and novel instrumentation and/or stimulus     Comment, Intervention (Auditory Comprehension) Will reduce visual field on device until pt feels more comfortable on increasing depth of response.                   Reading and Writing    Reading and Writing - 07/31/24 1841          Written Language    Written Expression (Written Language) unable/difficult to assess     Word Level, Written Expression (Written Language) impaired     Functional Tasks (Written Language) unable/difficult to assess     Comment, Assessment (Written Language) Pt demontrates some impairments of form of letters provided dominant impaired hand and non-dominant hand, with impaired letter choice and laborious motor facility. iPt exhibits a impaired use of right arm with some neglect observed.        Reading Comprehension    Oral Reading Ability (Reading Comprehension) word level;letter/symbol level     Sentence Level, Oral Reading Ability (Reading Comprehension) impaired;severe impairment     Word Level, Oral Reading Ability (Reading Comprehension) impaired;moderate impairment;severe impairment;achieved with cues     Letter/Symbol Level, Oral Reading Ability (Reading Comprehension) impaired;moderate impairment;severe impairment;achieved with cues     Comprehension Level (Reading Comprehension) sentence level tasks     Sentence Level, Comprehension Level (Reading Comprehension) impaired;minimal impairment     Word, Comprehension Level (Reading Comprehension) impaired;minimal impairment     Functional Reading Tasks (Reading Comprehension) signs/labels     Signs/Labels, Functional Reading (Reading Comprehension) moderate impairment     Comment, Assessment (Reading Comprehension) Oral reading is more impaired than silent reading for comprehension due to motor speech impairments. Silently pt reads up to sentence level material containing some complex words, requiring  minimal cueing to read more complex sentences and paragraph-level material with the ability to answer yes/no and multiple choice questions.     Comment, Intervention (Reading Comprehension) SGD should consider photos, pictures and/or symbols paired with words and phrases for icon selection                   Motor Speech   Cognition    Executive Function and Cognition - 08/01/24 1141          Executive Function Assessment    Executive Function Deficit abstract thinking;impulse control;information processing;insight/awareness of deficits;judgment;organization/sequencing;planning/decision-making;problem-solving/reasoning;self-monitoring/self-correction     Comment SGD should consider single depth response and increase as tolerated and EF and memory skills permit.        Orientation    Orientation Comments:  Pt will benefit from daily Orientation practice using SGD        Memory    Comments Further assessment and consideration with progression of Auditory comprehension and expressive language provided AAC device.        Attention    Behavioral Issues difficulty managing stress;overwhelmed easily;verbal outbursts;disinhibition     Focused Attention Observation: pt can be observed as restless, requires prompts,     Sustained Attention Observation: suspect some difficulty with concentration, attention may wander, may exhibit fatigue, due to comprehension deficits, may miss details,     Selective Attention May be easily distracted     Alternating Attention May become overwhelmed, confused and difficulty dealing with multiple stimuli.        Cognition    Safety Deficit impulsivity;insight into deficits/self-awareness;safety precautions follow-through/compliance;problem-solving;moderate deficit                   OM: FCM/Voice/Neuro    Outcome Measures - 07/31/24 2038          Functional Communication Measures    FCM: Augmentative/Alternative Communication 1-->Level 1     FCM: Motor Speech 4-->Level 4     FCM: Reading  5-->Level 5     FCM: Spoken Language, Comprehension 5-->Level 5     FCM: Spoken Language, Expression 4-->Level 4                   Augmentative Assessment    Augmentative Assmt - 07/31/24 1842          Augmentative/Alternative Communication Assessment (AAC)    Additional Documentation Augmentative/Alternative Communication (AAC) (Group)        Augmentative/Alternative Communication (AAC)    Gesture Mode (AAC) hand gestures;head nods/shakes;pointing     Hand Gestures (AAC) used independently     Head Nods and Shakes, Gestures (AAC) used independently     Pointing, Gestures (AAC) used independently     Communication Device/Method Used (AAC) electronic device     Level of Communication (AAC) yes/no response;basic needs/wants;makes choices;single words;phrases     Level of Cueing Required (AAC) maximum cueing;requires extra time to respond;requires repetition of questions/directions;requires verbal/visual cues;requires gestural prompt;requires choices to be provided;modeling/imitation needed     Comment, Assessment (AAC) Pt required max assistance to explore AAC device.     Intervention (AAC) multimodal communication training;high tech AAC device training     Comment, Intervention (AAC) Pt and POA will benefit from continued training on electronic AAC device.                     TREATMENT PLAN:      INITIAL EVALUATION: Alternative Augmentative Communication (AAC)/Speech Generative Device (SGD)  FINE MOTOR/ACCESS: (as it relates to use of SGD)  Notes:  [x] Isolated digit motor control  [] Proprioceptive feedback  [x] Hand dominance:  [x]Right     []Left  [x] Active wrist extension or wrist support orthotic (approximately 20 degrees)  [x] Elbow flexion/extension to 90 degrees  [] Force calibration    MOBILITY:  Will a mount be needed? No  Wheelchair Make: N/A  Wheelchair Model: N/A  Notes:    HEARING & VISION: (history of impairment, SGD modifications that may be required)  Notes: Pt reports difficulty seeing icons on  screen with and without glasses.  Recommend update vision evaluation    LITERACY:   Education Status:Grade 12  Functional Reading Level:   [] Non-reader  [] Single- word  [x]Sentence  [x]Paragraph  []Mixed    Notes: see reading section of evaluation      COGNITIVE ABILITIES: Client is able to...  [x] Learn new tasks, including basic device operations  [x]Maintain attention to the task at hand  []Navigate between pages with minimal prompting  [x]Locate symbols on a page  []Attend to the display  []Remember the locations of symbols  []Navigate between pages independently  []Recognize that the SGD can be used to communicate wants and needs.     Notes: see cognitive section of evaluation    DAILY COMMUNICATION NEEDS:  Communicates with these partners:  []Spouse  []Parents/siblings  []Extended family  [x]Friends  [x]Healthcare provider  []Caregiver  []School staff  [x]Stranger  [x]Neighbor  [x]Community member  []Person who can not read  []Person with visual impairment  []Person with hearing impairment    Communication in these Environments:  [x]Home  [x]Medical facility  [x]Community  []Work  []School  []Support group  [x]Telephone    Communication in these situations:  [x]1:1 and small group conversations  []Large group events  []Family/social gatherings  [x]Telephone interactions      Communicate messages, convey ideas and participate in these activities:  [x]Express physical wants and needs  [x]Express needs/wants in emergencies  [x]Express feelings/frustrations appropriately  [x]Generate novel utterances  []Participate in decision making  []Participate in conversation  [x]Access medical care  [x]Share information    NON-SGD APPROACHES:  Notes: (low tech options trialed and whey they've been ruled out)    SGD FEATURES:  SGD features required by this client:  [x]Screen size: A screen size of    []<6 inches   [] 6-8 inches   [x]   9-12 inches    [] 13+ inches  is needed to balance the size of the SGD and what client is  able to access.   [x] Lightweight, easy to carry  [x] Battery holds a charge throughout the day  [x] Protective case  [x] Dynamic display for navigation and ease of programming  [x] Voice Amplification  [x] Multiple ways to generate messages    Selection techniques used:   [x]  Direct (hands of feet)   []   keyguard  []   touchguide  [] Switch Scanning- how many switches  [] Joystick  [] Headpointing  [] Eye Gaze  [] Combination of methods    Describe: single digit selection    Language System  Method(s) of language representation:                                 Type of message formulation:  [] Spelling       [] letters  [x]Single meaning pictures/categories. Word power   [x] Single words  [] Multiple meaning pictures/unity, LAMP Words for Life [x] phrases  []Visual scenes      [] Sentences    Features to promote language growth:  [] Morphological endings      [x] Word predictions  [x] Vocabulary builder       [x]Icon predictor  [] Hide/show keys       []Abbreviations expansion  [] Built in vocabulary progression     [x]Predictable vocabulary organization  [] Robust amount of pre-stored vocabulary    []Consistent motor plan for words   [x]Easy access to core vocabulary to support novel utterance      Required software features:   [x] Pronunciation exceptions  [x] Word finder  [] Built in language data analysis  [x]Camera for specific programming  [x] Custom button functions and appearance (font, size, background color, highlight options, action, magnification)  [x] Large symbol library  [x] Ability to customize vocabulary without device present (e.g. PASS, ChatEditor)      Selected Accessories:  []Eye gaze  []Head pointing  []Switches  []Joystick  []Keyguards  []Touchglides  []Extra symbols         GOALS:     Goals          Patient Stated      patient stated (pt-stated)       POA: supplement communication  Pt: improve speech and language production.          Other      Language- AAC Goal          Long Term Goals    Time Frame  Result  Comment/Progress    Pt will improve FCM in the following areas:  Augmentative/Alternative Communication  Current: 1  Goal: 3-4   12           EXPANDING VOCABULARY  The pt will use the AAC device to expand their functional vocabulary, adding and using 10 new words or phrases appropriately in daily communication  12        SOCIAL INTERRACTION  The pt will use the SGDdevice to participate in social interactions, such as greeting peers, asking questions, and making comments, in 80% of observed opportunities over a 3  month period  12        SIMPLE PHRASE CONSTRUCTION  The pt will use the SGD device to construct and communicate simple phrases in 70% of appropriate contexts  12        INDEPENDENCE  The pt will use the AAC device independently in a variety of settings (home and community)for different purposes  (social, personal) with 90% success over a 3 month period  12        DAILY ROUTINES  Pt will use the SGD device to communicat during daily routines with minimal prompting in 80% of opportunities  12        REPAIRING COMMUNICATION BREAKDOWNS  Pt esequiel use the SGD device to repair communication breakdowns in 70% of instnaces when misunderstandings occur 12     Pt supports will demonstrate appropriate stratiegies to support pts use of the SGD device, such as providing wait time and modeling use, in 90% of observed interactions.  12     PEER TRAINING  Peers will engage in supported interactions using the AAC device showing undersandings and patience in 3 out of 4 observed opportunities.  12        Short Term Goals Time Frame Result Comment/Progress   POA will demonstrate Operational Competence- Supervision, pt with min A  Powering the device on/off  Adjusting the volume  Accessing the information on the screen; visual field scanning, direct selection, proprioceptive feedback and forced calibration  Developing proficiency in using the system efficiently 4 w     INITIATE COMMUNICATION:  Pt will use the  AAC device to initiate a conversation 3 out of 5 opportunities across natural and simulated settings, min A   (friends, healthcare provider, stranger, neighbor, member of community)  8 w     SYMBOL RECOGNITION  Pt will correctly identify and select symbols or icons on the AAC devcie for:  basic needs  Orientation  feelings/frustrations  novel responses  share information   with 80% accuracy, min A 8 w     LANGUAGE SYSTEMS   Pt will use single meaning folders and cards within TouchChat to communicate in single words, single meaning prepared scripts, and syntax phrases, min A.  12 w     IMPROVING ACCURACY  Pt will improve their accuracy in selecting the correct symbols on the SGD device by 20% over the next 3 months.  8 w                 FCM: Augmentative/Alternative Communication  L1: The individual attempts to communicate (eg. Gestures, pointing, communication board, electronic device, etc) However communication using augmentative-alternative communication is not meaningful to familiar or unfamiliar listeners at any time regardless of amount of cueing or assistance.                 EVALUATION AND ASSESSMENT:     Assessment - 07/31/24 1636          Assessment    Plan of Care reviewed and patient/family in agreement Yes     Comments Reviewed plan of care for 12 months to address language through a SGD     Rehab Potential/Prognosis (SLP) good, to achieve stated therapy goals     Problem List Impaired communication     Demonstrates Need for Referral to Another Service other (see comments);ENT physician     Actions taken vision and hearing evaluations.     Clinical Assessment Ms. 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 Evaluation for appropriateness for electronic AAC/SGD indicate that Ms. Sams is a likely candidate for a SGD to supplement and continue with remediation of communication limitations.  Screening of motor  access, hearing  and vision, literacy and present reading skills, as well as present levels of cognition, language comprehension and expression support the likeliness and benefit of a SGD to communicate with friends, healthcare providers, community members, and neighbors in home, medical facilities, community and via telephone.  Skilled communication therapy will benefit Ms. Sams to address simple to moderate in complexity use of the device over the next 3 months to orient and familiarize to the electronic communication device.     Plan and Recommendations Initiate goals: basic use.     Planned Services CPT 00125 Speech Lang Voice Comm and/or Auditory Proc (Treatment of speech, language, voice, communication and/or hearing processing disorder)                   Magnolia Lundy, CCC-SLP

## 2024-08-02 NOTE — PROGRESS NOTES
Speech-Language Pathology Evaluation    [facility] OP Therapy Fax: [phone]    SLP EVALUATION FOR OUTPATIENT THERAPY    Patient: Jennifer Sams     MRN: [id number]  : 1945 79 y.o.    Referring Physician: Ney Aparicio,*  Date of Visit: 2024    Certification Dates:  24 through 10/30/24    Recommended Frequency & Duration:  2 times/week for up to 3 months   PN: 2024    Diagnosis:   1. Cerebrovascular accident (CVA), unspecified mechanism (CMS/[facility])    2. Cardioembolic stroke (CMS/[facility])    3. Aphasia        Chief Complaints:   Chief Complaint   Patient presents with    Speech Problem     Significantly reduced ability to communicate within environment due to mixed receptive and expressive aphasia, motor speech disorder       Precautions: Precautions Comments: aphasia,    Past Medical History:   Past Medical History:   Diagnosis Date    AAA (abdominal aortic aneurysm) (CMS/[facility])     Arthritis     Elevated blood pressure reading without diagnosis of hypertension 2019    Epistaxis 2020    GERD (gastroesophageal reflux disease) 2019    Glaucoma 2019    Heart murmur     Hiatal hernia     History of hip replacement, total, right 2019    Right hip 2016 and revision 2017     History of rheumatic fever as a child 2019    Hypercholesterolemia 2019    Ischemic cardiomyopathy 2019    Kidney cysts     MI (myocardial infarction) (CMS/[facility])     Osteoarthritis of multiple joints 2019    Osteoporosis     Pacemaker 2019    Raynaud's disease 2019    RSD (reflex sympathetic dystrophy) 2019    Of left foot    SOB (shortness of breath) 12/10/2019    Status post insertion of drug eluting coronary artery stent 2019    Stroke (CMS/[facility])        Past Surgical History:   Past Surgical History:   Procedure Laterality Date    CHOLECYSTECTOMY OPEN      CORONARY ANGIOPLASTY WITH STENT PLACEMENT  2019    of LAD    CORONARY ANGIOPLASTY  WITH STENT PLACEMENT  04/30/2019    of RCA    DILATION AND CURETTAGE OF UTERUS      EYE SURGERY      RIGHT CATARACT    FOOT SURGERY Left     JOINT REPLACEMENT Right 09/2016    total hip    REVISION TOTAL HIP ARTHROPLASTY Right 2017    TONSILLECTOMY         LEARNING ASSESSMENT    Assessment completed: Yes    Learner name:  Jennifer Sams    Learner: Patient    Learning Barriers:  Learning barriers: Reading, Language, Visual, Hearing, Emotional, and Cognitive    Preferred Language: English     Needed: No    Education Provided:   Method: Discussion, Handout, Demonstration, and Video  Readiness: acceptance  Response: Needs reinforcement      CO-LEARNER ASSESSMENT:    Completed: Yes:   Co-Learner name:  Thelma Sheppard    Learner: Other Healthcare POA    Learning Barriers:  Learning barriers: No Barriers    Preferred Language: English     Needed: No    Education Provided:   Method:  Discussion, Handout, and Demonstration  Readiness:   acceptance and eager  Response:   Demonstrated understanding, Verbalizes understanding, and Pt demonstrated hands-on understanding use of device  Comments: Participated in educational session with [employer] representative.  Explored device online prior to session      Welcome letter discussed: Yes Patient provided with Welcome Letter, which includes attendance policy. Provided education regarding cancellation and no-show policy. Education regarding the importance of participation and regular attendance to maximize goal attainment.     OBJECTIVE MEASUREMENTS/DATA:    Session Time:  Start Time: 1505 (sign on with [employer])  Stop Time: 1615  Time Calculation (min): 70 min   Assessment - 07/31/24 1636          Assessment    Plan of Care reviewed and patient/family in agreement Yes     Comments Reviewed plan of care for 12 months to address language through a SGD     Rehab Potential/Prognosis (SLP) good, to achieve stated therapy goals     Problem List Impaired  communication     Demonstrates Need for Referral to Another Service other (see comments);ENT physician     Actions taken vision and hearing evaluations.     Clinical Assessment Ms. 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 Evaluation for appropriateness for electronic AAC/SGD indicate that Ms. Sams is a likely candidate for a SGD to supplement and continue with remediation of communication limitations.  Screening of motor access, hearing  and vision, literacy and present reading skills, as well as present levels of cognition, language comprehension and expression support the likeliness and benefit of a SGD to communicate with friends, healthcare providers, community members, and neighbors in home, medical facilities, community and via telephone.  Skilled communication therapy will benefit Ms. Sams to address simple to moderate in complexity use of the device over the next 3 months to orient and familiarize to the electronic communication device.     Plan and Recommendations Initiate goals: basic use.     Planned Services CPT 44175 Speech Lang Voice Comm and/or Auditory Proc (Treatment of speech, language, voice, communication and/or hearing processing disorder)                    General Information - 07/31/24 2038          Session Details    Document Type initial evaluation     Mode of Treatment individual therapy        General Information    Onset of Illness/Injury or Date of Surgery 06/26/23     Referring Physician Dr. Aparicio     History of present illness/functional impairment  is a 79 y.o. female who presented to [facility] on June 26 after she was not seen by her friends for a few days.  Ms. Sams lives alone and when she was found by EMS she was confused and combative.  In the emergency department she was aphasic with right-sided weakness and hypertensive to the 190 systolic.  CT scan of the head  revealed an acute infarct in left MCA with mild bleeding.  Echo showed an EF of 43% and a bubble study was negative.  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 Pt transferred to  [facility] on 7/1/2023 Principal problem  Acute ischemic left MCA stroke (CMS/HCC). PMH AAA, heart block s/p cardiac pacemaker, glaucoma, hyperlipidemia, coronary artery disease,  Transferred to outpatient and evaluated at [facility] on 8/18/2024. Following spech and language therapy over the last 10 months Ms. Sams returns to speech therapy for a trial of an AAC device to aid in communication and further support language and speech remediation.     Precautions Comments aphasia,     Limitations/Impairments vision difficulty;difficulty hearing;safety/cognitive;other (see comments)   levels of anxiety    Interventions Language and speech impairment     Patient/Family/Caregiver Comments/Observations Pt attended with Thelma BECERRIL Barb stating that patient has been nervous and anxious about trialing the device over the last few days.  Pt and POA attended session with AAC device representative via [employer]        Hearing     Hearing Status hearing impairment, bilaterally   audiology evaluation recommended.                   Pain and Vitals - 07/31/24 1841          Pain Assessment    Currently in pain No/Denies                    SLP - 07/31/24 1622          Speech Therapy    Speech Therapy Specialty [facility]   AAC in conjuction with language       SLP Frequency and Duration    Frequency of treatment 2 times/week     Speech Duration 3 months     SLP Custom Frequency and Duration PN: 8/30/2024     SLP Cert From 07/31/24     SLP Cert To 10/30/24     Date SLP POC was sent to provider 07/31/24     Signed SLP Plan of Care received?  No                    Falls Assessment/Food Screening - 07/31/24 2038          Initial Falls Assessment    One or more falls in the last year No        Food Insecurity    Within  the past 12 months, you worried that your food would run out before you got the money to buy more. Never true     Within the past 12 months, the food you bought just didn't last and you didn't have money to get more. Never true                     Living Environment    Living Environment - 07/31/24 2038          Living Environment    People in Home alone   Loan alvarenga    Living Environment Comment 2SH, B/B 2nd flr-tub shower with shower chair and grabbars in shower, FF stairs to 2nd flr w/L ascending rail, no NM.  no DARÍO. Caregiver 2 times per week for 4 hours per day who assists with home management and socialization. Transportation from [facility] and provides assistance with managing care.     Transportation Concerns other (see comments)   depends on rides from friends                  PLOF    Prior Level of Function - 07/31/24 2038          OTHER    Previous level of function Independent ADL/IADLs, driving, lives alone, completed finances, retired  to president of company                    07/31/24 1841   Augmentative/Alternative Communication Assessment (AAC)   Additional Documentation Augmentative/Alternative Communication (AAC) (Group)   Augmentative/Alternative Communication (AAC)   Intervention (AAC) multimodal communication training;high tech AAC device training   Communication Device/Method Used (AAC) electronic device   Gesture Mode (AAC) hand gestures;head nods/shakes;pointing   Comment, Intervention (AAC) Pt and POA will benefit from continued training on electronic AAC device.   Level of Cueing Required (AAC) maximum cueing;requires extra time to respond;requires repetition of questions/directions;requires verbal/visual cues;requires gestural prompt;requires choices to be provided;modeling/imitation needed   Comment, Assessment (AAC) Pt required max assistance to explore AAC device.   Level of Communication (AAC) yes/no response;basic needs/wants;makes choices;single words;phrases   Hand  Gestures (AAC) used independently   Head Nods and Shakes, Gestures (AAC) used independently   Pointing, Gestures (AAC) used independently       Language Assessment     Language Assessment - 08/01/24 1141          Verbal Expression    Automatic Speech (Verbal Expression) unable/difficult to assess;alphabet;counting;days of the week;months of the year     Alphabet, Automatic Speech (Verbal Expression) achieved in unison;impaired     Counting, Automatic Speech (Verbal Expression) achieved in unison;impaired     Days of the Week, Automatic Speech (Verbal Expression) achieved in unison;unable/difficult to assess;impaired     Months of the Year, Automatic Speech (Verbal Expression) achieved in unison;unable/difficult to assess;impaired     Confrontational Naming (Verbal Expression) unable/difficult to assess;body parts;objects;pictures     Body Parts, Confrontational Naming (Verbal Expression) achieved with phonemic cues;achieved with modeling;impaired     Objects, Confrontational Naming (Verbal Expression) impaired;achieved with phonemic cues;achieved with modeling;achieved with sentence completion cues     Pictures, Confrontational Naming (Verbal Expression) impaired;achieved with phonemic cues;achieved with modeling;achieved with sentence completion cues     Word Finding Skills (Verbal Expression) unable/difficult to assess     Repetition Skills (Verbal Expression) words     Words, Repetition Skills (Verbal Expression) impaired;achieved with articulatory cues;achieved with multiple trials     Conversational Speech (Verbal Expression) unable/difficult to assess;phrase level     Phrase Level, Conversational Speech (Verbal Expression) severe impairment     Comment, Assessment (Verbal Expression) Expressive communication is successful to initiate communication using spoken language in simple, structured conversation in routine daily activities with familar communication partners.  Benefits from cuing, demonstrates simple  sentences and fluent islands of speech.     Comment, Intervention (Verbal Expression) AAC consider personal information, basic needs, basic conversational needs, no more than 2 in depth, reduced visual field        Auditory Comprehension    Follows Commands (Auditory Comprehension) 2-step commands     2 Step, Follows Commands (Auditory Comprehension) 50-74% accuracy;achieved with cues     Yes/No Questions (Auditory Comprehension) simple/factual questions;biographical/personal questions;other (see comments)   Simple paragraph information    Simple/Factual Questions (Auditory Comprehension) 50-74% accuracy;achieved with repetition     Biographical/Personal Questions (Auditory Comprehension) 50-74% accuracy;achieved with cues     Paragraph Comprehension (Auditory Comprehension) --   simple paragraph: yes/no 50-74%    Object Identification (Auditory Comprehension) field of 4;other (see comments)   F6    Field of 4, Object Identification (Auditory Comprehension) 75-90% accuracy     Picture Identification (Auditory Comprehension) field of 4 pictures;multiple pictures;other (see comments)   up to F6    Field of 4, Picture Identification (Auditory Comprehension) 75-90% accuracy     Comment, Assessment (Auditory Comprehension) Accuracy may vary with increased anxiety response, and novel instrumentation and/or stimulus     Comment, Intervention (Auditory Comprehension) Will reduce visual field on device until pt feels more comfortable on increasing depth of response.                   Reading and Writing    Reading and Writing - 07/31/24 4611          Written Language    Written Expression (Written Language) unable/difficult to assess     Word Level, Written Expression (Written Language) impaired     Functional Tasks (Written Language) unable/difficult to assess     Comment, Assessment (Written Language) Pt demontrates some impairments of form of letters provided dominant impaired hand and non-dominant hand, with impaired  letter choice and laborious motor facility. iPt exhibits a impaired use of right arm with some neglect observed.        Reading Comprehension    Oral Reading Ability (Reading Comprehension) word level;letter/symbol level     Sentence Level, Oral Reading Ability (Reading Comprehension) impaired;severe impairment     Word Level, Oral Reading Ability (Reading Comprehension) impaired;moderate impairment;severe impairment;achieved with cues     Letter/Symbol Level, Oral Reading Ability (Reading Comprehension) impaired;moderate impairment;severe impairment;achieved with cues     Comprehension Level (Reading Comprehension) sentence level tasks     Sentence Level, Comprehension Level (Reading Comprehension) impaired;minimal impairment     Word, Comprehension Level (Reading Comprehension) impaired;minimal impairment     Functional Reading Tasks (Reading Comprehension) signs/labels     Signs/Labels, Functional Reading (Reading Comprehension) moderate impairment     Comment, Assessment (Reading Comprehension) Oral reading is more impaired than silent reading for comprehension due to motor speech impairments. Silently pt reads up to sentence level material containing some complex words, requiring minimal cueing to read more complex sentences and paragraph-level material with the ability to answer yes/no and multiple choice questions.     Comment, Intervention (Reading Comprehension) SGD should consider photos, pictures and/or symbols paired with words and phrases for icon selection                   Motor Speech   Cognition    Executive Function and Cognition - 08/01/24 1141          Executive Function Assessment    Executive Function Deficit abstract thinking;impulse control;information processing;insight/awareness of deficits;judgment;organization/sequencing;planning/decision-making;problem-solving/reasoning;self-monitoring/self-correction     Comment SGD should consider single depth response and increase as tolerated and EF  and memory skills permit.        Orientation    Orientation Comments:  Pt will benefit from daily Orientation practice using SGD        Memory    Comments Further assessment and consideration with progression of Auditory comprehension and expressive language provided AAC device.        Attention    Behavioral Issues difficulty managing stress;overwhelmed easily;verbal outbursts;disinhibition     Focused Attention Observation: pt can be observed as restless, requires prompts,     Sustained Attention Observation: suspect some difficulty with concentration, attention may wander, may exhibit fatigue, due to comprehension deficits, may miss details,     Selective Attention May be easily distracted     Alternating Attention May become overwhelmed, confused and difficulty dealing with multiple stimuli.        Cognition    Safety Deficit impulsivity;insight into deficits/self-awareness;safety precautions follow-through/compliance;problem-solving;moderate deficit                   OM: FCM/Voice/Neuro    Outcome Measures - 07/31/24 2038          Functional Communication Measures    FCM: Augmentative/Alternative Communication 1-->Level 1     FCM: Motor Speech 4-->Level 4     FCM: Reading 5-->Level 5     FCM: Spoken Language, Comprehension 5-->Level 5     FCM: Spoken Language, Expression 4-->Level 4                     TREATMENT PLAN:        GOALS:     Goals          Patient Stated      patient stated (pt-stated)       POA: supplement communication  Pt: improve speech and language production.          Other      Language- AAC Goal          Long Term Goals   Time Frame  Result  Comment/Progress    Pt will improve FCM in the following areas:  Augmentative/Alternative Communication  Current: 1  Goal: 3-4   12           EXPANDING VOCABULARY  The pt will use the AAC device to expand their functional vocabulary, adding and using 10 new words or phrases appropriately in daily communication  12        SOCIAL INTERRACTION  The pt will  use the SGDdevice to participate in social interactions, such as greeting peers, asking questions, and making comments, in 80% of observed opportunities over a 3  month period  12        SIMPLE PHRASE CONSTRUCTION  The pt will use the SGD device to construct and communicate simple phrases in 70% of appropriate contexts  12        INDEPENDENCE  The pt will use the AAC device independently in a variety of settings (home and community)for different purposes  (social, personal) with 90% success over a 3 month period  12        DAILY ROUTINES  Pt will use the SGD device to communicat during daily routines with minimal prompting in 80% of opportunities  12        REPAIRING COMMUNICATION BREAKDOWNS  Pt esequiel use the SGD device to repair communication breakdowns in 70% of instnaces when misunderstandings occur 12     Pt supports will demonstrate appropriate stratiegies to support pts use of the SGD device, such as providing wait time and modeling use, in 90% of observed interactions.  12     PEER TRAINING  Peers will engage in supported interactions using the AAC device showing undersandings and patience in 3 out of 4 observed opportunities.  12        Short Term Goals Time Frame Result Comment/Progress   POA will demonstrate Operational Competence- Supervision, pt with min A  Powering the device on/off  Adjusting the volume  Accessing the information on the screen; visual field scanning, direct selection, proprioceptive feedback and forced calibration  Developing proficiency in using the system efficiently 4 w     INITIATE COMMUNICATION:  Pt will use the AAC device to initiate a conversation 3 out of 5 opportunities across natural and simulated settings, min A   (friends, healthcare provider, stranger, neighbor, member of community)  8 w     SYMBOL RECOGNITION  Pt will correctly identify and select symbols or icons on the AAC devcie for:  basic needs  Orientation  feelings/frustrations  novel responses  share information    with 80% accuracy, min A 8 w     LANGUAGE SYSTEMS   Pt will use single meaning folders and cards within TouchChat to communicate in single words, single meaning prepared scripts, and syntax phrases, min A.  12 w     IMPROVING ACCURACY  Pt will improve their accuracy in selecting the correct symbols on the SGD device by 20% over the next 3 months.  8 w                 FCM: Augmentative/Alternative Communication  L1: The individual attempts to communicate (eg. Gestures, pointing, communication board, electronic device, etc) However communication using augmentative-alternative communication is not meaningful to familiar or unfamiliar listeners at any time regardless of amount of cueing or assistance.                 EVALUATION AND ASSESSMENT:     Assessment - 07/31/24 5396          Assessment    Plan of Care reviewed and patient/family in agreement Yes     Comments Reviewed plan of care for 12 months to address language through a SGD     Rehab Potential/Prognosis (SLP) good, to achieve stated therapy goals     Problem List Impaired communication     Demonstrates Need for Referral to Another Service other (see comments);ENT physician     Actions taken vision and hearing evaluations.     Clinical Assessment Ms. 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 Evaluation for appropriateness for electronic AAC/SGD indicate that Ms. Sams is a likely candidate for a SGD to supplement and continue with remediation of communication limitations.  Screening of motor access, hearing  and vision, literacy and present reading skills, as well as present levels of cognition, language comprehension and expression support the likeliness and benefit of a SGD to communicate with friends, healthcare providers, community members, and neighbors in home, medical facilities, community and via telephone.  Skilled communication therapy will benefit Ms.  Latrell to address simple to moderate in complexity use of the device over the next 3 months to orient and familiarize to the electronic communication device.     Plan and Recommendations Initiate goals: basic use.     Planned Services CPT 43573 Speech Lang Voice Comm and/or Auditory Proc (Treatment of speech, language, voice, communication and/or hearing processing disorder)                     Magnolia Lundy, CCC-SLP

## 2024-08-08 ENCOUNTER — HOSPITAL ENCOUNTER (OUTPATIENT)
Dept: SPEECH THERAPY | Age: 79
Setting detail: THERAPIES SERIES
Discharge: HOME | End: 2024-08-08
Attending: INTERNAL MEDICINE
Payer: MEDICARE

## 2024-08-08 DIAGNOSIS — I63.9 CEREBROVASCULAR ACCIDENT (CVA), UNSPECIFIED MECHANISM (CMS/HCC): Primary | ICD-10-CM

## 2024-08-08 DIAGNOSIS — R47.01 APHASIA: ICD-10-CM

## 2024-08-08 PROCEDURE — 92609 USE OF SPEECH DEVICE SERVICE: CPT | Mod: GN,KX

## 2024-08-08 NOTE — OP SLP TREATMENT LOG
COGNITION FLOW SHEET  LANGUAGE  [facility] [id number]    8/8/2024     SHORT TERM GOALS PROGRESS TOWARD GOALS CURRENT SESSION   POA will demonstrate Operational Competence- Supervision, pt with min A  Powering the device on/off  Adjusting the volume  Accessing the information on the screen; visual field scanning, direct selection, proprioceptive feedback and forced calibration  Developing proficiency in using the system efficiently  Power on: VC- Mod A  Put up stand-(S)  Volume: (S)  Visual field- increased to 6- pt is able to select  Improved proprioception feedback and forced calibration using left hand- (S)  Set control settings to left and folders to right.    INITIATE COMMUNICATION:  Pt will use the AAC device to initiate a conversation 3 out of 5 opportunities across natural and simulated settings, min A   (friends, healthcare provider, stranger, neighbor, member of community)   Initiated set up to initiate conversation with X2 friends.  Will add another friend for total of 3.  Encouraged to practice that selection to initiate over next few days.    Also will practice dining and eating folders to select what she would like to eat.    SYMBOL RECOGNITION  Pt will correctly identify and select symbols or icons on the AAC devcie for:  basic needs  Orientation  feelings/frustrations  novel responses  share information   with 80% accuracy, min A  Brainstorm basic needs orientation and feelings.     LANGUAGE SYSTEMS   Pt will use single meaning folders and cards within TouchChat to communicate in single words, single meaning prepared scripts, and syntax phrases, min A.   Practicing learning single words and simple scripts with single icons.    IMPROVING ACCURACY  Pt will improve their accuracy in selecting the correct symbols on the SGD device by 20% over the next 3 months.          Education/ Strategy Training:   Education provided: use of AAC    will benefit from further education/training      Other:  Therapist to consult with  [place] consultant re:  Setting up calendar to discuss orientation questions.   Setting up digits for 10's, 100's value.       Long Term Goals   Time Frame    Pt will improve FCM in the following areas:  Augmentative/Alternative Communication  Current: 1  Goal: 3-4    12    EXPANDING VOCABULARY  The pt will use the AAC device to expand their functional vocabulary, adding and using 10 new words or phrases appropriately in daily communication  12    SOCIAL INTERRACTION  The pt will use the SGD device to participate in social interactions, such as greeting peers, asking questions, and making comments, in 80% of observed opportunities over a 3  month period  12    SIMPLE PHRASE CONSTRUCTION  The pt will use the SGD device to construct and communicate simple phrases in 70% of appropriate contexts  12    INDEPENDENCE  The pt will use the AAC device independently in a variety of settings (home and community)for different purposes  (social, personal) with 90% success over a 3 month period  12    DAILY ROUTINES  Pt will use the SGD device to communicate during daily routines with minimal prompting in 80% of opportunities  12    REPAIRING COMMUNICATION BREAKDOWNS  Pt esequiel use the SGD device to repair communication breakdowns in 70% of instnaces when misunderstandings occur 12   Pt supports will demonstrate appropriate stratiegies to support pts use of the SGD device, such as providing wait time and modeling use, in 90% of observed interactions.  12   PEER TRAINING  Peers will engage in supported interactions using the AAC device showing undersandings and patience in 3 out of 4 observed opportunities.  12

## 2024-08-08 NOTE — PROGRESS NOTES
Speech-Language Pathology Visit      SLP DAILY NOTE FOR OUTPATIENT THERAPY    Patient: Jennifer Sams   MRN: [id number]  : 1945 79 y.o.  Referring Physician: Ney Aparicio,*  Date of Visit: 2024      Certification Dates: 24 through 10/30/24    Diagnosis:   1. Cerebrovascular accident (CVA), unspecified mechanism (CMS/HCC)    2. Aphasia        Chief Complaints:  Aphasia and apraxia limit communication    Precautions:          TODAY'S VISIT:    Session Time:  Start Time: 1605 (arrived late)  Stop Time: 1705 (counseling)  Time Calculation (min): 60 min   History/Vitals/Pain/Encounter Info - 24 1609          Injury History/Precautions/Daily Required Info    Primary Therapist Magnolia Lundy MS, CCC/SLP     Chief Complaint/Reason for Visit  Aphasia and apraxia limit communication     Referring Physician Dr. Aparicio     Document Type daily treatment     Patient/Family/Caregiver Comments/Observations Pt encouraged to carry device to and from therapy.     Patient reported fall since last visit No        Pain Assessment    Currently in pain No/Denies                    Daily Treatment Assessment and Plan - 24 1836          Daily Treatment Assessment and Plan    Progress toward goals Progressing     Daily Outcome Summary Pt continues to express frustration with learning curve.  Additionally expressed concerns that it will substitute language progression rather than supplement.  Improved digit calibration and reduced impulsivity with close monitoring.     Plan and Recommendations follow up on pt practice with X2-3 conversational tabs with friends, and deciding where to eat out and what to order.  Follow up on basic needs from pt and POA.  Set up calendar: seasons, months, dates, years.                      OBJECTIVE MEASUREMENTS TAKEN TODAY:    None Taken    Today's Treatment:    COGNITION FLOW SHEET  LANGUAGE  CPT 21412    2024     SHORT TERM GOALS PROGRESS TOWARD GOALS  CURRENT SESSION   POA will demonstrate Operational Competence- Supervision, pt with min A  Powering the device on/off  Adjusting the volume  Accessing the information on the screen; visual field scanning, direct selection, proprioceptive feedback and forced calibration  Developing proficiency in using the system efficiently  Power on: VC- Mod A  Put up stand-(S)  Volume: (S)  Visual field- increased to 6- pt is able to select  Improved proprioception feedback and forced calibration using left hand- (S)  Set control settings to left and folders to right.    INITIATE COMMUNICATION:  Pt will use the AAC device to initiate a conversation 3 out of 5 opportunities across natural and simulated settings, min A   (friends, healthcare provider, stranger, neighbor, member of community)   Initiated set up to initiate conversation with X2 friends.  Will add another friend for total of 3.  Encouraged to practice that selection to initiate over next few days.    Also will practice dining and eating folders to select what she would like to eat.    SYMBOL RECOGNITION  Pt will correctly identify and select symbols or icons on the AAC devcie for:  basic needs  Orientation  feelings/frustrations  novel responses  share information   with 80% accuracy, min A  Brainstorm basic needs orientation and feelings.     LANGUAGE SYSTEMS   Pt will use single meaning folders and cards within TouchChat to communicate in single words, single meaning prepared scripts, and syntax phrases, min A.   Practicing learning single words and simple scripts with single icons.    IMPROVING ACCURACY  Pt will improve their accuracy in selecting the correct symbols on the SGD device by 20% over the next 3 months.          Education/ Strategy Training:   Education provided: use of AAC    will benefit from further education/training      Other:  Therapist to consult with [employer] consultant re:  Setting up calendar to discuss orientation questions.   Setting up  digits for 10's, 100's value.       Long Term Goals   Time Frame    Pt will improve FCM in the following areas:  Augmentative/Alternative Communication  Current: 1  Goal: 3-4    12    EXPANDING VOCABULARY  The pt will use the AAC device to expand their functional vocabulary, adding and using 10 new words or phrases appropriately in daily communication  12    SOCIAL INTERRACTION  The pt will use the SGD device to participate in social interactions, such as greeting peers, asking questions, and making comments, in 80% of observed opportunities over a 3  month period  12    SIMPLE PHRASE CONSTRUCTION  The pt will use the SGD device to construct and communicate simple phrases in 70% of appropriate contexts  12    INDEPENDENCE  The pt will use the AAC device independently in a variety of settings (home and community)for different purposes  (social, personal) with 90% success over a 3 month period  12    DAILY ROUTINES  Pt will use the SGD device to communicate during daily routines with minimal prompting in 80% of opportunities  12    REPAIRING COMMUNICATION BREAKDOWNS  Pt esequiel use the SGD device to repair communication breakdowns in 70% of instnaces when misunderstandings occur 12   Pt supports will demonstrate appropriate stratiegies to support pts use of the SGD device, such as providing wait time and modeling use, in 90% of observed interactions.  12   PEER TRAINING  Peers will engage in supported interactions using the AAC device showing undersandings and patience in 3 out of 4 observed opportunities.  12

## 2024-08-12 ENCOUNTER — APPOINTMENT (EMERGENCY)
Dept: RADIOLOGY | Facility: HOSPITAL | Age: 79
End: 2024-08-12
Payer: MEDICARE

## 2024-08-12 ENCOUNTER — HOSPITAL ENCOUNTER (EMERGENCY)
Facility: HOSPITAL | Age: 79
Discharge: LEFT BEFORE DISCHARGE | End: 2024-08-12
Attending: EMERGENCY MEDICINE | Admitting: FAMILY MEDICINE
Payer: MEDICARE

## 2024-08-12 VITALS
HEART RATE: 72 BPM | DIASTOLIC BLOOD PRESSURE: 110 MMHG | OXYGEN SATURATION: 97 % | BODY MASS INDEX: 17.03 KG/M2 | HEIGHT: 69 IN | RESPIRATION RATE: 20 BRPM | WEIGHT: 115 LBS | TEMPERATURE: 97.9 F | SYSTOLIC BLOOD PRESSURE: 158 MMHG

## 2024-08-12 DIAGNOSIS — R53.1 RIGHT SIDED WEAKNESS: Primary | ICD-10-CM

## 2024-08-12 DIAGNOSIS — Z86.73 HISTORY OF CARDIOEMBOLIC STROKE: ICD-10-CM

## 2024-08-12 DIAGNOSIS — I63.512 ACUTE ISCHEMIC LEFT MCA STROKE (CMS/HCC): ICD-10-CM

## 2024-08-12 LAB
ALBUMIN SERPL-MCNC: 4.2 G/DL (ref 3.5–5.7)
ALP SERPL-CCNC: 57 IU/L (ref 34–125)
ALT SERPL-CCNC: 9 IU/L (ref 7–52)
ANION GAP SERPL CALC-SCNC: 6 MEQ/L (ref 3–15)
AST SERPL-CCNC: 18 IU/L (ref 13–39)
BASOPHILS # BLD: 0.03 K/UL (ref 0.01–0.1)
BASOPHILS NFR BLD: 0.5 %
BILIRUB SERPL-MCNC: 0.7 MG/DL (ref 0.3–1.2)
BUN SERPL-MCNC: 16 MG/DL (ref 7–25)
CALCIUM SERPL-MCNC: 9.7 MG/DL (ref 8.6–10.3)
CHLORIDE SERPL-SCNC: 106 MEQ/L (ref 98–107)
CO2 SERPL-SCNC: 27 MEQ/L (ref 21–31)
CREAT SERPL-MCNC: 0.9 MG/DL (ref 0.6–1.2)
DIFFERENTIAL METHOD BLD: ABNORMAL
EGFRCR SERPLBLD CKD-EPI 2021: >60 ML/MIN/1.73M*2
EOSINOPHIL # BLD: 0.06 K/UL (ref 0.04–0.36)
EOSINOPHIL NFR BLD: 1.1 %
ERYTHROCYTE [DISTWIDTH] IN BLOOD BY AUTOMATED COUNT: 12.3 % (ref 11.7–14.4)
GLUCOSE SERPL-MCNC: 117 MG/DL (ref 70–99)
HCT VFR BLD AUTO: 45.6 % (ref 35–45)
HGB BLD-MCNC: 15.2 G/DL (ref 11.8–15.7)
IMM GRANULOCYTES # BLD AUTO: 0.04 K/UL (ref 0–0.08)
IMM GRANULOCYTES NFR BLD AUTO: 0.7 %
LYMPHOCYTES # BLD: 1.58 K/UL (ref 1.2–3.5)
LYMPHOCYTES NFR BLD: 28.4 %
MCH RBC QN AUTO: 31 PG (ref 28–33.2)
MCHC RBC AUTO-ENTMCNC: 33.3 G/DL (ref 32.2–35.5)
MCV RBC AUTO: 92.9 FL (ref 83–98)
MONOCYTES # BLD: 0.48 K/UL (ref 0.28–0.8)
MONOCYTES NFR BLD: 8.6 %
NEUTROPHILS # BLD: 3.38 K/UL (ref 1.7–7)
NEUTS SEG NFR BLD: 60.7 %
NRBC BLD-RTO: 0 %
PDW BLD AUTO: 8.6 FL (ref 9.4–12.3)
PLATELET # BLD AUTO: 163 K/UL (ref 150–369)
POTASSIUM SERPL-SCNC: 4.1 MEQ/L (ref 3.5–5.1)
PROT SERPL-MCNC: 6.9 G/DL (ref 6–8.2)
RBC # BLD AUTO: 4.91 M/UL (ref 3.93–5.22)
SODIUM SERPL-SCNC: 139 MEQ/L (ref 136–145)
TROPONIN I SERPL HS-MCNC: 3.7 PG/ML
WBC # BLD AUTO: 5.57 K/UL (ref 3.8–10.5)

## 2024-08-12 PROCEDURE — 85025 COMPLETE CBC W/AUTO DIFF WBC: CPT | Performed by: PHYSICIAN ASSISTANT

## 2024-08-12 PROCEDURE — 80053 COMPREHEN METABOLIC PANEL: CPT | Performed by: PHYSICIAN ASSISTANT

## 2024-08-12 PROCEDURE — 99284 EMERGENCY DEPT VISIT MOD MDM: CPT | Mod: 25

## 2024-08-12 PROCEDURE — 36415 COLL VENOUS BLD VENIPUNCTURE: CPT | Performed by: EMERGENCY MEDICINE

## 2024-08-12 PROCEDURE — 84484 ASSAY OF TROPONIN QUANT: CPT | Performed by: PHYSICIAN ASSISTANT

## 2024-08-12 PROCEDURE — 93005 ELECTROCARDIOGRAM TRACING: CPT | Performed by: PHYSICIAN ASSISTANT

## 2024-08-12 PROCEDURE — 70450 CT HEAD/BRAIN W/O DYE: CPT

## 2024-08-12 RX ORDER — IBUPROFEN 200 MG
16-32 TABLET ORAL AS NEEDED
Status: DISCONTINUED | OUTPATIENT
Start: 2024-08-12 | End: 2024-08-12 | Stop reason: HOSPADM

## 2024-08-12 RX ORDER — DEXTROSE 50 % IN WATER (D50W) INTRAVENOUS SYRINGE
25 AS NEEDED
Status: DISCONTINUED | OUTPATIENT
Start: 2024-08-12 | End: 2024-08-12 | Stop reason: HOSPADM

## 2024-08-12 RX ORDER — DEXTROSE 40 %
15-30 GEL (GRAM) ORAL AS NEEDED
Status: DISCONTINUED | OUTPATIENT
Start: 2024-08-12 | End: 2024-08-12 | Stop reason: HOSPADM

## 2024-08-12 ASSESSMENT — ENCOUNTER SYMPTOMS
FEVER: 0
COUGH: 0
HEMATURIA: 0
EXTREMITY WEAKNESS: 1
AGITATION: 0
WEAKNESS: 1
SORE THROAT: 0
EYE PAIN: 0
DIZZINESS: 1
VOMITING: 0
DYSURIA: 0
ABDOMINAL PAIN: 0
BACK PAIN: 0
CONFUSION: 0
CHILLS: 0
PALPITATIONS: 0
SEIZURES: 0
SHORTNESS OF BREATH: 0
ARTHRALGIAS: 0
COLOR CHANGE: 0

## 2024-08-12 NOTE — DISCHARGE INSTRUCTIONS
We recommended hospitalization for an MRI and neurology consult. You may return at anytime if you change your mind.  Please return for any worsening or concerning symptoms    You are leaving against medical advice.  The emergency provider has discussed at great length that without further evaluation and monitoring there may be unforeseen circumstances and/or deterioration causing permanent bodily harm or death because of your leaving.  Please follow-up with your doctor for re-evaluation and as advised.  Return to the ED immediately if you change your mind at any time, your condition begins to change or worsen in any way, or any problems or concerns.

## 2024-08-12 NOTE — ED PROVIDER NOTES
Emergency Medicine Note  HPI   HISTORY OF PRESENT ILLNESS     The patient is a 79-year-old female with past medical history significant for abdominal aortic aneurysm, hypertension, hyperlipidemia, ischemic cardiomyopathy, pacemaker, CAD, and ischemic stroke with residual right sided weakness and aphasia. She presents today for evaluation of worsening right side weakness and balance problems over the past 2 days. She is unsure of exact onset of symptoms. She also reports paresthesia right face, right lower arm, and right lower leg. Speech is at baseline.       History provided by:  Patient   used: No    Dizziness  Associated symptoms: weakness    Associated symptoms: no chest pain, no palpitations, no shortness of breath and no vomiting    Extremity Weakness  Associated symptoms: no abdominal pain, no chest pain, no cough, no ear pain, no fever, no rash, no shortness of breath, no sore throat and no vomiting          Patient History   PAST HISTORY     Reviewed from Nursing Triage:  Tobacco  Allergies  Meds  Problems  Med Hx  Surg Hx  Fam Hx  Soc   Hx      Past Medical History:   Diagnosis Date    AAA (abdominal aortic aneurysm) (CMS/[facility])     Arthritis     Elevated blood pressure reading without diagnosis of hypertension 04/19/2019    Epistaxis 01/06/2020    GERD (gastroesophageal reflux disease) 04/19/2019    Glaucoma 04/19/2019    Heart murmur     Hiatal hernia     History of hip replacement, total, right 04/19/2019    Right hip 9/ 2016 and revision 2017     History of rheumatic fever as a child 04/19/2019    Hypercholesterolemia 04/19/2019    Ischemic cardiomyopathy 05/09/2019    Kidney cysts     MI (myocardial infarction) (CMS/[facility])     Osteoarthritis of multiple joints 04/19/2019    Osteoporosis     Pacemaker 12/09/2019    Raynaud's disease 04/19/2019    RSD (reflex sympathetic dystrophy) 04/19/2019    Of left foot    SOB (shortness of breath) 12/10/2019    Status post insertion of drug  eluting coronary artery stent 05/09/2019    Stroke (CMS/[facility])        Past Surgical History:   Procedure Laterality Date    CHOLECYSTECTOMY OPEN      CORONARY ANGIOPLASTY WITH STENT PLACEMENT  04/29/2019    of LAD    CORONARY ANGIOPLASTY WITH STENT PLACEMENT  04/30/2019    of RCA    DILATION AND CURETTAGE OF UTERUS      EYE SURGERY      RIGHT CATARACT    FOOT SURGERY Left     JOINT REPLACEMENT Right 09/2016    total hip    REVISION TOTAL HIP ARTHROPLASTY Right 2017    TONSILLECTOMY         Family History   Problem Relation Age of Onset    Congenital heart disease Biological Mother         noted at autopsy    Pulmonary fibrosis Biological Father     Heart attack Paternal Grandfather        Social History     Tobacco Use    Smoking status: Never    Smokeless tobacco: Never   Vaping Use    Vaping Use: Never used   Substance Use Topics    Alcohol use: Not Currently    Drug use: No         Review of Systems   REVIEW OF SYSTEMS     Review of Systems   Constitutional:  Negative for chills and fever.   HENT:  Negative for ear pain and sore throat.    Eyes:  Negative for pain and visual disturbance.   Respiratory:  Negative for cough and shortness of breath.    Cardiovascular:  Negative for chest pain and palpitations.   Gastrointestinal:  Negative for abdominal pain and vomiting.   Endocrine: Negative for polyuria.   Genitourinary:  Negative for dysuria and hematuria.   Musculoskeletal:  Positive for extremity weakness. Negative for arthralgias and back pain.   Skin:  Negative for color change and rash.   Neurological:  Positive for dizziness and weakness. Negative for seizures and syncope.   Psychiatric/Behavioral:  Negative for agitation and confusion.          VITALS     ED Vitals      Date/Time Temp Pulse Resp BP SpO2 Who   08/12/24 1856 -- 72 20 158/110 97 % LL   08/12/24 1815 -- 73 20 181/79 96 % MK   08/12/24 1549 -- 66 34 108/73 94 % LL   08/12/24 1507 36.6 °C (97.9 °F) 80 18 124/59 99 % MJR          Pulse Ox %: 99 %  (08/12/24 1510)  Pulse Ox Interpretation: Normal (08/12/24 1510)  Heart Rate: 65 (08/12/24 1510)  Rhythm Strip Interpretation: Paced Rhythm (08/12/24 1510)     Physical Exam   PHYSICAL EXAM     Physical Exam  Vitals and nursing note reviewed.   Constitutional:       General: She is not in acute distress.     Appearance: She is well-developed. She is not diaphoretic.   HENT:      Head: Normocephalic and atraumatic.      Nose: Nose normal.   Eyes:      General: No visual field deficit.     Extraocular Movements: Extraocular movements intact.      Conjunctiva/sclera: Conjunctivae normal.      Pupils: Pupils are equal, round, and reactive to light.   Neck:      Trachea: Trachea normal.   Cardiovascular:      Rate and Rhythm: Normal rate and regular rhythm.      Heart sounds: Normal heart sounds, S1 normal and S2 normal. No murmur heard.  Pulmonary:      Effort: Pulmonary effort is normal. No respiratory distress.      Breath sounds: Normal breath sounds. No wheezing or rales.   Chest:      Chest wall: No tenderness.   Abdominal:      General: Bowel sounds are normal. There is no distension.      Palpations: Abdomen is soft. There is no mass.      Tenderness: There is no abdominal tenderness. There is no guarding or rebound.   Musculoskeletal:      Cervical back: Neck supple.   Skin:     General: Skin is warm and dry.      Coloration: Skin is not pale.      Findings: No rash.   Neurological:      Mental Status: She is alert and oriented to person, place, and time.      Cranial Nerves: Dysarthria present. No facial asymmetry.      Sensory: No sensory deficit.      Motor: Weakness (right hand weakness-mild) present.      Comments: Expressive and receptive aphasia noted. Family reports at baseline.Patient unable to perform FNF.    Psychiatric:         Speech: Speech normal.         Behavior: Behavior normal. Behavior is cooperative.         Thought Content: Thought content normal.         Judgment: Judgment normal.            PROCEDURES     Procedures     DATA     Results       Procedure Component Value Units Date/Time    HS Troponin (with 2 hour reflex) [[id number]]  (Normal) Collected: 08/12/24 1522    Specimen: Blood, Venous Updated: 08/12/24 1611     High Sens Troponin I 3.7 pg/mL     Comprehensive metabolic panel [[id number]]  (Abnormal) Collected: 08/12/24 1522    Specimen: Blood, Venous Updated: 08/12/24 1603     Sodium 139 mEQ/L      Potassium 4.1 mEQ/L      Comment: Results obtained on plasma. Plasma Potassium values may be up to 0.4 mEQ/L less than serum values. The differences may be greater for patients with high platelet or white cell counts.        Chloride 106 mEQ/L      CO2 27 mEQ/L      BUN 16 mg/dL      Creatinine 0.9 mg/dL      Glucose 117 mg/dL      Calcium 9.7 mg/dL      AST (SGOT) 18 IU/L      ALT (SGPT) 9 IU/L      Alkaline Phosphatase 57 IU/L      Total Protein 6.9 g/dL      Comment: Test performed on plasma which typically contains approximately 0.4 g/dL more protein than serum.        Albumin 4.2 g/dL      Bilirubin, Total 0.7 mg/dL      eGFR >60.0 mL/min/1.73m*2      Comment: Calculation based on the Chronic Kidney Disease Epidemiology Collaboration (CKD-EPI) equation refit without adjustment for race.        Anion Gap 6 mEQ/L     CBC and differential [[id number]]  (Abnormal) Collected: 08/12/24 1522    Specimen: Blood, Venous Updated: 08/12/24 1539     WBC 5.57 K/uL      RBC 4.91 M/uL      Hemoglobin 15.2 g/dL      Hematocrit 45.6 %      MCV 92.9 fL      MCH 31.0 pg      MCHC 33.3 g/dL      RDW 12.3 %      Platelets 163 K/uL      MPV 8.6 fL      Differential Type Auto     nRBC 0.0 %      Immature Granulocytes 0.7 %      Neutrophils 60.7 %      Lymphocytes 28.4 %      Monocytes 8.6 %      Eosinophils 1.1 %      Basophils 0.5 %      Immature Granulocytes, Absolute 0.04 K/uL      Neutrophils, Absolute 3.38 K/uL      Lymphocytes, Absolute 1.58 K/uL      Monocytes, Absolute 0.48 K/uL      Eosinophils, Absolute 0.06  K/uL      Basophils, Absolute 0.03 K/uL     RAINBOW RED [[id number]] Collected: 08/12/24 1522    Specimen: Blood, Venous Updated: 08/12/24 1535    RAINBOW GOLD [[id number]] Collected: 08/12/24 1522    Specimen: Blood, Venous Updated: 08/12/24 1535    [facility] Draw Panel [[id number]] Collected: 08/12/24 1522    Specimen: Blood, Venous Updated: 08/12/24 1535    Narrative:      The following orders were created for panel order [facility] Draw Panel.  Procedure                               Abnormality         Status                     ---------                               -----------         ------                     RAINBOW RED[[id number]]                                      In process                 RAINBOW LT BLUE[[id number]]                                  In process                 RAINBOW GOLD[[id number]]                                     In process                   Please view results for these tests on the individual orders.    ASHLEY LT BLUE [[id number]] Collected: 08/12/24 1522    Specimen: Blood, Venous Updated: 08/12/24 1535            Imaging Results              CT HEAD WITHOUT IV CONTRAST (Final result)  Result time 08/12/24 16:46:28      Final result                   Impression:    IMPRESSION:  1. No CT evidence of an acute large vascular territorial infarct or acute  intracranial hemorrhage.  2. Large chronic infarct in the posterior left MCA territory, chronic  microangiopathy and involutional changes.    As clinically indicated, further evaluation could be performed with MRI.               Narrative:      CLINICAL HISTORY:  Neuro deficit, acute, stroke suspected  Dizziness, persistent/recurrent, cardiac or vascular cause suspected  right arm, leg, face paresthesia x 2 days. Baseline weakness on right, prior  stroke.    COMMENT:  An unenhanced CT scan of the brain was performed from the foramen magnum to the  vertex. Coronal and sagittal reconstructions were obtained.    CT DOSE:  One or more dose  reduction techniques (e.g. automated exposure  control, adjustment of the mA and/or kV according to patient size, use of  iterative reconstruction technique) utilized for this examination.    Comparison: 6/27/2023    Findings:  There is a large chronic infarct in the posterior aspect of the left MCA  territory which is involved in the interim. There is associated ex vacuo  dilatation of the left lateral ventricle. The ventricles are midline and the  basilar cisterns are patent. Involutional changes are present. Patchy  hypoattenuation is present elsewhere in the white matter which is nonspecific  but likely represents chronic microvascular ischemic changes. There is no gross  new loss of the gray-white matter discrimination to suggest an acute large  vascular territorial infarction. Atherosclerotic intracranial vascular  calcification is present. No acute intracranial hemorrhage or extra-axial fluid  collection is identified.    The visualized portions of the paranasal sinuses and mastoid air cells appear  patent. Mild hyperostosis frontalis interna is noted. Bilateral ocular lens  implants are present.                                        ECG 12 lead   Independent Interpretation by ED Provider   Rhythm: [paced rhythm]  Rate: 65  P waves: [normal interval]  QRS: [normal QRS]  Axis: [normal]  ST Segments: [no obvious ST elevation or ischemia]  The study has been interpreted contemporaneously by me -            Scoring tools                  NIH Stroke Scale: 5                 ED Course & MDM   MDM / ED COURSE / CLINICAL IMPRESSION / DISPO     Medical Decision Making  80 yo F presented for increased R side weakness and balance issues. Head CT was normal. Labs normal. Neurology was consulted and recommended admission for MRI. After speaking with [facility] physician in detail the patient declined hospitalization siting concern for MRI and her ability to communicate effectively while here. She is agreeable to follow up  with neuro out patient.     Problems Addressed:  Right sided weakness: chronic illness or injury with exacerbation, progression, or side effects of treatment    Amount and/or Complexity of Data Reviewed  Independent Historian:      Details: Daughter-POA  Labs: ordered.  Radiology: ordered. Decision-making details documented in ED Course.  ECG/medicine tests: ordered and independent interpretation performed.  Discussion of management or test interpretation with external provider(s): neurology    Risk  Decision regarding hospitalization.        ED Course as of 08/12/24 1950   Mon Aug 12, 2024   1654 CT HEAD WITHOUT IV CONTRAST  --  IMPRESSION:  1. No CT evidence of an acute large vascular territorial infarct or acute  intracranial hemorrhage.  2. Large chronic infarct in the posterior left MCA territory, chronic  microangiopathy and involutional changes.     As clinically indicated, further evaluation could be performed with MRI.   [SL]   1657 Communicated with Dr. Moya (neurology). Recommending admit for MRI. Patient is anticoagulated on Eliquis. [SL]   1702 Case was discussed with hospitalist.  Reviewed patient's presentation, ED course, and relevant data.  Hospitalist accepts patient on their service and will see / admit pt.  Dr. Magaña. [SL]   1943 I was told by RN that patient wanted to leave AMA. The patient had met with [facility] [place] but ultimately did not agree. She is concerned about the MRI and her pace maker. She states she would be more comfortable at home due to her difficulty communicating from aphasia. I recommended staying for neuro consult and testing again. I discussed that this was the recommendation of her neurologist. Patient and daughter wish to be discharged regardless. She is leaving ama. The patient stumbled while walking out. Daughter assisted her and both insisted on leaving despite balance issues. I expressed my concern for a fall at home.  [SL]   1946 This patient is choosing  to leave against medical advice.  The emergency provider (EP) has personally explained to her that choosing to do so may result in permanent bodily harm or death.  The EP discussed at great length that without further evaluation and monitoring there may be unforeseen circumstances and/or deterioration causing permanent bodily harm or death as a result of her choice.  She verbalized these risks back to the physician in layman's terms.  She is alert, oriented, and shows the mental capacity to make clear decisions regarding her health care at this time. She continues to wish to leave against medical advice.    In light of her decision to leave [facility], follow-up has been advised and she is aware of the importance of following up as instructed.  She has been advised that she should return to the ED immediately if she changes her mind at any time, or if her condition begins to change or worsen in any way. Discharge instructions were provided to the patient.   [SL]      ED Course User Index  [SL] Marivel Ramos PA C     Clinical Impression      Right sided weakness     _________________       ED Disposition   Marivel Florez PA C  08/12/24 1950

## 2024-08-12 NOTE — ED ATTESTATION NOTE
I have personally seen and examined Jennifer Sams.  I was involved in the care and medical decision making for this patient.    I reviewed and agree with physician assistant / nurse practitioner’s assessment and plan of care; any exceptions are documented below.      My focused history, examination, assessment and plan of care of Jennifer Sams is as follows:    Brief History:  HPI  79-year-old female with history of left MCA stroke complicated by residual aphasia, mild right-sided weakness, hypertension, hyperlipidemia on Eliquis presents to the emergency department for evaluation of worsening of her chronic right-sided weakness as well as possible mild worsening aphasia and dizziness over the past 2 to 3 days.  She first noticed worsening right-sided weakness about 3 days ago and it seemed a bit worse this morning as well.  She denies any falls or trauma.  Denies any headaches, new vision changes, chest pain or abdominal pain.  No nausea or vomiting.  She has been eating and drinking well. Her friend spoke with her neurologist, Dr. Aparicio and she was referred to the ED for further evaluation.     Focused Physical Exam:  Physical Exam  Vitals and nursing note reviewed.   Constitutional:       General: She is in acute distress.      Appearance: Normal appearance. She is normal weight. She is not toxic-appearing.   HENT:      Head: Normocephalic and atraumatic.      Nose: Nose normal.      Mouth/Throat:      Mouth: Mucous membranes are moist.   Eyes:      Extraocular Movements: Extraocular movements intact.      Pupils: Pupils are equal, round, and reactive to light.   Cardiovascular:      Rate and Rhythm: Normal rate.      Pulses: Normal pulses.      Heart sounds: Normal heart sounds.   Pulmonary:      Effort: Pulmonary effort is normal.      Breath sounds: Normal breath sounds.   Abdominal:      Palpations: Abdomen is soft.      Tenderness: There is no abdominal tenderness. There is no guarding or rebound.    Musculoskeletal:         General: Normal range of motion.      Cervical back: Normal range of motion and neck supple.   Skin:     General: Skin is warm and dry.      Capillary Refill: Capillary refill takes less than 2 seconds.   Neurological:      Mental Status: She is alert.      Comments: + aphasia (at baseline per friend), mild RUE weakness   Psychiatric:         Mood and Affect: Mood normal.         Behavior: Behavior normal.           Assessment / Plan:        Medical Decision Making  79-year-old female with history of hypertension, hyperlipidemia, prior left MCA stroke with residual mild right-sided weakness and aphasia presents to the emergency department for possible worsening of her right-sided weakness over the past 2 to 3 days. Vitals wnl on arrival. NIHSS~4 (appears mostly at baseline). Not a candidate for TNK since last normal >24 hours ago. Differential includes possible recurrent CVA, metabolic derangements, dehydration, UTI/ sepsis.  Will check EKG, labs, head CT and d/w neurology team.     Amount and/or Complexity of Data Reviewed  Labs: ordered.  Radiology: ordered. Decision-making details documented in ED Course.  ECG/medicine tests: ordered and independent interpretation performed.        I was physically present for the key/critical portions of the following procedures:  Procedures          Giles Espino MD  08/12/24 8123       Giles Espino MD  08/18/24 2078

## 2024-08-12 NOTE — ASSESSMENT & PLAN NOTE
-Previous left MCA CVA  -Patient has residual significant aphasia and some weakness  -Right lower extremity with acute worsening of weakness, new onset numbness and face tingling

## 2024-08-13 ENCOUNTER — HOSPITAL ENCOUNTER (OUTPATIENT)
Dept: SPEECH THERAPY | Age: 79
Setting detail: THERAPIES SERIES
Discharge: HOME | End: 2024-08-13
Attending: INTERNAL MEDICINE
Payer: MEDICARE

## 2024-08-13 DIAGNOSIS — R47.01 APHASIA: Primary | ICD-10-CM

## 2024-08-13 LAB
ATRIAL RATE: 65
P AXIS: 68
PR INTERVAL: 192
QRS DURATION: 178
QT INTERVAL: 474
QTC CALCULATION(BAZETT): 492
R AXIS: -51
T WAVE AXIS: 91
VENTRICULAR RATE: 65

## 2024-08-13 PROCEDURE — 92609 USE OF SPEECH DEVICE SERVICE: CPT | Mod: GN

## 2024-08-13 NOTE — PROGRESS NOTES
Speech-Language Pathology Visit      SLP DAILY NOTE FOR OUTPATIENT THERAPY    Patient: Jennifer Sams   MRN: [id number]  : 1945 79 y.o.  Referring Physician: Ney Aparicio,*  Date of Visit: 2024      Certification Dates: 24 through 10/30/24    Diagnosis:   1. Aphasia        Chief Complaints:  Aphasia, severe expressive- AAC device    Precautions:  Precautions Comments: Aphasia       TODAY'S VISIT:    Session Time:  Start Time: 1616 (arrived late due to traffic)  Stop Time: 1700  Time Calculation (min): 44 min   History/Vitals/Pain/Encounter Info - 24 1710          Injury History/Precautions/Daily Required Info    Primary Therapist Magnolia Lundy MS, CCC/SLP     Chief Complaint/Reason for Visit  Aphasia, severe expressive- AAC device     Onset of Illness/Injury or Date of Surgery 24     Referring Physician Dr. Aparicio     Precautions Comments Aphasia     Document Type daily treatment     Patient/Family/Caregiver Comments/Observations Pt's good friend César attended session to learn more about Lingraphica device. Pt was notably agitated observed with increased volume and rate of speech.  When therapist requested pt to carry her own device pt refused.  Therapist requested pt to put device in her large purse. Pt refused.  When therapist demonstrated that the device fits, pt took it out and threw it on the table.  Therapist inqured if she would like to leave it here and pt picked up the device and left carrying both the device and purse.     Patient reported fall since last visit No        Pain Assessment    Currently in pain No/Denies                    Daily Treatment Assessment and Plan - 24 1710          Daily Treatment Assessment and Plan    Progress toward goals Progressing     Daily Outcome Summary Pt's friend verbalized understanding of newly learned process to program lingraphica, understand it's purpose and Pt's skills to optimize use of device.     Plan and  Recommendations Continue to collect data and important information from friends, pt needs, explore device with pt.                      OBJECTIVE MEASUREMENTS TAKEN TODAY:    None Taken    Today's Treatment:    COGNITION FLOW SHEET  LANGUAGE  [facility] [address]   PN: 8/30/2024  POC: 10/30/2024 8/13/2024     SHORT TERM GOALS PROGRESS TOWARD GOALS CURRENT SESSION   POA will demonstrate Operational Competence- Supervision, pt with min A  Powering the device on/off  Adjusting the volume  Accessing the information on the screen; visual field scanning, direct selection, proprioceptive feedback and forced calibration  Developing proficiency in using the system efficiently Power on: VC- Mod A  Put up stand-(S)  Volume: (S)  Visual field- increased to 6- pt is able to select  Improved proprioception feedback and forced calibration using left hand- (S)  Set control settings to left and folders to right.  Pt demonstrated this date.Powering the device on/off Min A    Adjusting the volume- Mod A    Accessing the information on the screen-4/4   visual field scanning- F6  direct selection: accurate  proprioceptive feedback and forced calibration- appropriate     INITIATE COMMUNICATION:  Pt will use the AAC device to initiate a conversation 3 out of 5 opportunities across natural and simulated settings, min A   (friends, healthcare provider, stranger, neighbor, member of community)  Initiated set up to initiate conversation with X2 friends.  Will add another friend for total of 3.  Encouraged to practice that selection to initiate over next few days.    Also will practice dining and eating folders to select what she would like to eat.  Pt is not initiating communication with the device.  Added a folder for her friend César and started conversation starters.     Added a folder in places to eat- for wanting to stay home- eat BBQ   SYMBOL RECOGNITION  Pt will correctly identify and select symbols or icons on the AAC devcie for:  basic  needs  Orientation  feelings/frustrations  novel responses  share information   with 80% accuracy, min A Brainstorm basic needs orientation and feelings. Visited cards briefly and encouraged pt and friend to explore folders for:  Orientation  Feelings/frustrations    Added folder in interest- I like horror movies- Vinckelle Strickland movies    Added to pt's story that she went to [facility].     LANGUAGE SYSTEMS   Pt will use single meaning folders and cards within TouchChat to communicate in single words, single meaning prepared scripts, and syntax phrases, min A.  Practicing learning single words and simple scripts with single icons.  Demonstrated taking a photo to label a folder/card, discussed recorded narrative or script    IMPROVING ACCURACY  Pt will improve their accuracy in selecting the correct symbols on the SGD device by 20% over the next 3 months.          Education/ Strategy Training:   Education provided: use of AAC    will benefit from further education/training      Other:  Therapist to consult with [employer] consultant re:  Setting up calendar to discuss orientation questions.   Setting up digits for 10's, 100's value.   Inability to access internet  Option for print larger when making icons,  If pt can say words, (ie yes, no, hello, goodbye) should we replace or remove      Long Term Goals   Time Frame    Pt will improve FCM in the following areas:  Augmentative/Alternative Communication  Current: 1  Goal: 3-4    12    EXPANDING VOCABULARY  The pt will use the AAC device to expand their functional vocabulary, adding and using 10 new words or phrases appropriately in daily communication  12    SOCIAL INTERRACTION  The pt will use the SGD device to participate in social interactions, such as greeting peers, asking questions, and making comments, in 80% of observed opportunities over a 3  month period  12    SIMPLE PHRASE CONSTRUCTION  The pt will use the SGD device to construct and communicate simple  phrases in 70% of appropriate contexts  12    INDEPENDENCE  The pt will use the AAC device independently in a variety of settings (home and community)for different purposes  (social, personal) with 90% success over a 3 month period  12    DAILY ROUTINES  Pt will use the SGD device to communicate during daily routines with minimal prompting in 80% of opportunities  12    REPAIRING COMMUNICATION BREAKDOWNS  Pt esequiel use the SGD device to repair communication breakdowns in 70% of instnaces when misunderstandings occur 12   Pt supports will demonstrate appropriate stratiegies to support pts use of the SGD device, such as providing wait time and modeling use, in 90% of observed interactions.  12   PEER TRAINING  Peers will engage in supported interactions using the AAC device showing undersandings and patience in 3 out of 4 observed opportunities.  12

## 2024-08-13 NOTE — OP SLP TREATMENT LOG
COGNITION FLOW SHEET  LANGUAGE  [facility] [id number]   PN: 8/30/2024  POC: 10/30/2024 8/13/2024     SHORT TERM GOALS PROGRESS TOWARD GOALS CURRENT SESSION   POA will demonstrate Operational Competence- Supervision, pt with min A  Powering the device on/off  Adjusting the volume  Accessing the information on the screen; visual field scanning, direct selection, proprioceptive feedback and forced calibration  Developing proficiency in using the system efficiently Power on: VC- Mod A  Put up stand-(S)  Volume: (S)  Visual field- increased to 6- pt is able to select  Improved proprioception feedback and forced calibration using left hand- (S)  Set control settings to left and folders to right.  Pt demonstrated this date.Powering the device on/off Min A    Adjusting the volume- Mod A    Accessing the information on the screen-4/4   visual field scanning- F6  direct selection: accurate  proprioceptive feedback and forced calibration- appropriate     INITIATE COMMUNICATION:  Pt will use the AAC device to initiate a conversation 3 out of 5 opportunities across natural and simulated settings, min A   (friends, healthcare provider, stranger, neighbor, member of community)  Initiated set up to initiate conversation with X2 friends.  Will add another friend for total of 3.  Encouraged to practice that selection to initiate over next few days.    Also will practice dining and eating folders to select what she would like to eat.  Pt is not initiating communication with the device.  Added a folder for her friend César and started conversation starters.     Added a folder in places to eat- for wanting to stay home- eat BBQ   SYMBOL RECOGNITION  Pt will correctly identify and select symbols or icons on the AAC devcie for:  basic needs  Orientation  feelings/frustrations  novel responses  share information   with 80% accuracy, min A Brainstorm basic needs orientation and feelings. Visited cards briefly and encouraged pt and friend to explore  folders for:  Orientation  Feelings/frustrations    Added folder in interest- I like horror movies- Vinckelle Strickland movies    Added to pt's story that she went to [facility].     LANGUAGE SYSTEMS   Pt will use single meaning folders and cards within TouchChat to communicate in single words, single meaning prepared scripts, and syntax phrases, min A.  Practicing learning single words and simple scripts with single icons.  Demonstrated taking a photo to label a folder/card, discussed recorded narrative or script    IMPROVING ACCURACY  Pt will improve their accuracy in selecting the correct symbols on the SGD device by 20% over the next 3 months.          Education/ Strategy Training:   Education provided: use of AAC    will benefit from further education/training      Other:  Therapist to consult with Ling[employer] consultant re:  Setting up calendar to discuss orientation questions.   Setting up digits for 10's, 100's value.   Inability to access internet  Option for print larger when making icons,  If pt can say words, (ie yes, no, hello, goodbye) should we replace or remove      Long Term Goals   Time Frame    Pt will improve FCM in the following areas:  Augmentative/Alternative Communication  Current: 1  Goal: 3-4    12    EXPANDING VOCABULARY  The pt will use the AAC device to expand their functional vocabulary, adding and using 10 new words or phrases appropriately in daily communication  12    SOCIAL INTERRACTION  The pt will use the SGD device to participate in social interactions, such as greeting peers, asking questions, and making comments, in 80% of observed opportunities over a 3  month period  12    SIMPLE PHRASE CONSTRUCTION  The pt will use the SGD device to construct and communicate simple phrases in 70% of appropriate contexts  12    INDEPENDENCE  The pt will use the AAC device independently in a variety of settings (home and community)for different purposes  (social, personal) with 90% success over  a 3 month period  12    DAILY ROUTINES  Pt will use the SGD device to communicate during daily routines with minimal prompting in 80% of opportunities  12    REPAIRING COMMUNICATION BREAKDOWNS  Pt esequiel use the SGD device to repair communication breakdowns in 70% of instnaces when misunderstandings occur 12   Pt supports will demonstrate appropriate stratiegies to support pts use of the SGD device, such as providing wait time and modeling use, in 90% of observed interactions.  12   PEER TRAINING  Peers will engage in supported interactions using the AAC device showing undersandings and patience in 3 out of 4 observed opportunities.  12

## 2024-08-20 ENCOUNTER — HOSPITAL ENCOUNTER (OUTPATIENT)
Dept: SPEECH THERAPY | Age: 79
Setting detail: THERAPIES SERIES
Discharge: HOME | End: 2024-08-20
Attending: INTERNAL MEDICINE
Payer: MEDICARE

## 2024-08-20 DIAGNOSIS — R47.01 APHASIA: Primary | ICD-10-CM

## 2024-08-20 PROCEDURE — 92609 USE OF SPEECH DEVICE SERVICE: CPT | Mod: GN,KX

## 2024-08-20 PROCEDURE — 92606 NON-SPEECH DEVICE SERVICE: CPT | Mod: GN

## 2024-08-20 NOTE — OP SLP TREATMENT LOG
COGNITION FLOW SHEET  LANGUAGE  [facility] [id number]   PN: 8/30/2024  POC: 10/30/2024 8/20/2024     SHORT TERM GOALS PROGRESS TOWARD GOALS CURRENT SESSION   POA will demonstrate Operational Competence- Supervision, pt with min A  Powering the device on/off  Adjusting the volume  Accessing the information on the screen; visual field scanning, direct selection, proprioceptive feedback and forced calibration  Developing proficiency in using the system efficiently Pt demonstrated this date.Powering the device on/off Min A    Adjusting the volume- Mod A    Accessing the information on the screen-4/4   visual field scanning- F6  direct selection: accurate  proprioceptive feedback and forced calibration- appropriate    Power on: VC- Mod A  Put up stand-(S)  Volume: (S)  Visual field- increased to 6- pt is able to select  Improved proprioception feedback and forced calibration using left hand- (S)  Set control settings to left and folders to right.  Patient:  Pt demonstrated this date.Powering the device on/off Mod I, difficulty recalling how to turn off, Min cues    Adjusting the volume- Min cues    Talk  screen. (S)    Pt demonstrating scanning of visual field, direct selection and proprioceptive feedback.      At times pt notes with swiping attempt to move screens rather than selecting pages.    INITIATE COMMUNICATION:  Pt will use the AAC device to initiate a conversation 3 out of 5 opportunities across natural and simulated settings, min A   (friends, healthcare provider, stranger, neighbor, member of community)  Pt is not initiating communication with the device.  Added a folder for her friend César and started conversation starters.     Added a folder in places to eat- for wanting to stay home- eat BBQ    Initiated set up to initiate conversation with X2 friends.  Will add another friend for total of 3.  Encouraged to practice that selection to initiate over next few days.    Also will practice dining and eating folders to  "select what she would like to eat.  Practice Fast Talk- to share needs more time to respond    Practiced 4 pages deep to share had a stroke and has aphasia and uses device to talk      Used 3 pages to select groceries- [employer]- [employer] will need.     SYMBOL RECOGNITION  Pt will correctly identify and select symbols or icons on the AAC devcie for:  basic needs  Orientation  feelings/frustrations  novel responses  share information   with 80% accuracy, min A Visited cards briefly and encouraged pt and friend to explore folders for:  Orientation  Feelings/frustrations    Added folder in interest- I like horror movies- [employer] movies    Added to pt's story that she went to [facility].     Brainstorm basic needs orientation and feelings.  Added feelings-frustration this date.    Practiced 'other icons\" to explore and recognize vocabulary   LANGUAGE SYSTEMS   Pt will use single meaning folders and cards within TouchChat to communicate in single words, single meaning prepared scripts, and syntax phrases, min A.  Demonstrated taking a photo to label a folder/card, discussed recorded narrative or script     Practicing learning single words and simple scripts with single icons.  Single words, prepared scripts.      Not using syntax at this time, however, practiced   \"I don't like...\"  \"Tomatoes.    IMPROVING ACCURACY  Pt will improve their accuracy in selecting the correct symbols on the SGD device by 20% over the next 3 months.          Education/ Strategy Training:   Education provided: discussing that device is to supplement language as an alternative means.  Discussed with POA that although device may stimulate language to continue to expand, it's purpose is to augment language skills.     will benefit from further education/training      Other: Therapist to consult with Lingraph[employer] consultant re:  Setting up calendar to discuss orientation questions.   Setting up digits for 10's, 100's value.   Inability to " access internet  Option for print larger when making icons,  If pt can say words, (ie yes, no, hello, goodbye) should we replace or remove Pt is progressing with use and would benefit from device.       Long Term Goals   Time Frame    Pt will improve FCM in the following areas:  Augmentative/Alternative Communication  Current: 1  Goal: 3-4    12    EXPANDING VOCABULARY  The pt will use the AAC device to expand their functional vocabulary, adding and using 10 new words or phrases appropriately in daily communication  12    SOCIAL INTERRACTION  The pt will use the SGD device to participate in social interactions, such as greeting peers, asking questions, and making comments, in 80% of observed opportunities over a 3  month period  12    SIMPLE PHRASE CONSTRUCTION  The pt will use the SGD device to construct and communicate simple phrases in 70% of appropriate contexts  12    INDEPENDENCE  The pt will use the AAC device independently in a variety of settings (home and community)for different purposes  (social, personal) with 90% success over a 3 month period  12    DAILY ROUTINES  Pt will use the SGD device to communicate during daily routines with minimal prompting in 80% of opportunities  12    REPAIRING COMMUNICATION BREAKDOWNS  Pt esequiel use the SGD device to repair communication breakdowns in 70% of instnaces when misunderstandings occur 12   Pt supports will demonstrate appropriate stratiegies to support pts use of the SGD device, such as providing wait time and modeling use, in 90% of observed interactions.  12   PEER TRAINING  Peers will engage in supported interactions using the AAC device showing undersandings and patience in 3 out of 4 observed opportunities.  12

## 2024-08-20 NOTE — PROGRESS NOTES
Speech-Language Pathology Visit      SLP DAILY NOTE FOR OUTPATIENT THERAPY    Patient: Jennifer Sams   MRN: [id number]  : 1945 79 y.o.  Referring Physician: Ney Aparicio,*  Date of Visit: 2024      Certification Dates: 24 through 10/30/24    Diagnosis:   1. Aphasia        Chief Complaints:  Aphasia, severe expressive- AAC device    Precautions:  Precautions Comments: Aphasia       TODAY'S VISIT:    Session Time:  Start Time: 1610 (pt arrived)  Stop Time: 1705  Time Calculation (min): 55 min   History/Vitals/Pain/Encounter Info - 24 1609          Injury History/Precautions/Daily Required Info    Primary Therapist Magnolia Lundy MS, CCC/SLP     Chief Complaint/Reason for Visit  Aphasia, severe expressive- AAC device     Onset of Illness/Injury or Date of Surgery 24     Referring Physician Dr. Aparicio     Precautions Comments Aphasia     Limitations/Impairments vision difficulty;difficulty hearing;safety/cognitive;other (see comments)     Document Type daily treatment     Patient reported fall since last visit No        Pain Assessment    Currently in pain No/Denies                    Daily Treatment Assessment and Plan - 24 1609          Daily Treatment Assessment and Plan    Progress toward goals Slower than expected     Daily Outcome Summary Pt improved openness to explore icons.     Plan and Recommendations Continue to support repeated and functional means of communication.                      OBJECTIVE MEASUREMENTS TAKEN TODAY:    None Taken    Today's Treatment:    COGNITION FLOW SHEET  LANGUAGE  CPT 46656   PN: 2024  POC: 10/30/2024 2024     SHORT TERM GOALS PROGRESS TOWARD GOALS CURRENT SESSION   POA will demonstrate Operational Competence- Supervision, pt with min A  Powering the device on/off  Adjusting the volume  Accessing the information on the screen; visual field scanning, direct selection, proprioceptive feedback and forced  calibration  Developing proficiency in using the system efficiently Pt demonstrated this date.Powering the device on/off Min A    Adjusting the volume- Mod A    Accessing the information on the screen-4/4   visual field scanning- F6  direct selection: accurate  proprioceptive feedback and forced calibration- appropriate    Power on: VC- Mod A  Put up stand-(S)  Volume: (S)  Visual field- increased to 6- pt is able to select  Improved proprioception feedback and forced calibration using left hand- (S)  Set control settings to left and folders to right.  Patient:  Pt demonstrated this date.Powering the device on/off Mod I, difficulty recalling how to turn off, Min cues    Adjusting the volume- Min cues    Talk  screen. (S)    Pt demonstrating scanning of visual field, direct selection and proprioceptive feedback.      At times pt notes with swiping attempt to move screens rather than selecting pages.    INITIATE COMMUNICATION:  Pt will use the AAC device to initiate a conversation 3 out of 5 opportunities across natural and simulated settings, min A   (friends, healthcare provider, stranger, neighbor, member of community)  Pt is not initiating communication with the device.  Added a folder for her friend César and started conversation starters.     Added a folder in places to eat- for wanting to stay home- eat BBQ    Initiated set up to initiate conversation with X2 friends.  Will add another friend for total of 3.  Encouraged to practice that selection to initiate over next few days.    Also will practice dining and eating folders to select what she would like to eat.  Practice Fast Talk- to share needs more time to respond    Practiced 4 pages deep to share had a stroke and has aphasia and uses device to talk      Used 3 pages to select groceries- [employer]- [employer]2 foods will need.     SYMBOL RECOGNITION  Pt will correctly identify and select symbols or icons on the AAC devcie for:  basic  "needs  Orientation  feelings/frustrations  novel responses  share information   with 80% accuracy, min A Visited cards briefly and encouraged pt and friend to explore folders for:  Orientation  Feelings/frustrations    Added folder in interest- I like horror movies- Vinckelle Strickland movies    Added to pt's story that she went to [facility].     Brainstorm basic needs orientation and feelings.  Added feelings-frustration this date.    Practiced 'other icons\" to explore and recognize vocabulary   LANGUAGE SYSTEMS   Pt will use single meaning folders and cards within TouchChat to communicate in single words, single meaning prepared scripts, and syntax phrases, min A.  Demonstrated taking a photo to label a folder/card, discussed recorded narrative or script     Practicing learning single words and simple scripts with single icons.  Single words, prepared scripts.      Not using syntax at this time, however, practiced   \"I don't like...\"  \"Tomatoes.    IMPROVING ACCURACY  Pt will improve their accuracy in selecting the correct symbols on the SGD device by 20% over the next 3 months.          Education/ Strategy Training:   Education provided: discussing that device is to supplement language as an alternative means.  Discussed with POA that although device may stimulate language to continue to expand, it's purpose is to augment language skills.     will benefit from further education/training      Other: Therapist to consult with [employer] consultant re:  Setting up calendar to discuss orientation questions.   Setting up digits for 10's, 100's value.   Inability to access internet  Option for print larger when making icons,  If pt can say words, (ie yes, no, hello, goodbye) should we replace or remove Pt is progressing with use and would benefit from device.       Long Term Goals   Time Frame    Pt will improve FCM in the following areas:  Augmentative/Alternative Communication  Current: 1  Goal: 3-4    12  "   EXPANDING VOCABULARY  The pt will use the AAC device to expand their functional vocabulary, adding and using 10 new words or phrases appropriately in daily communication  12    SOCIAL INTERRACTION  The pt will use the SGD device to participate in social interactions, such as greeting peers, asking questions, and making comments, in 80% of observed opportunities over a 3  month period  12    SIMPLE PHRASE CONSTRUCTION  The pt will use the SGD device to construct and communicate simple phrases in 70% of appropriate contexts  12    INDEPENDENCE  The pt will use the AAC device independently in a variety of settings (home and community)for different purposes  (social, personal) with 90% success over a 3 month period  12    DAILY ROUTINES  Pt will use the SGD device to communicate during daily routines with minimal prompting in 80% of opportunities  12    REPAIRING COMMUNICATION BREAKDOWNS  Pt esequiel use the SGD device to repair communication breakdowns in 70% of instnaces when misunderstandings occur 12   Pt supports will demonstrate appropriate stratiegies to support pts use of the SGD device, such as providing wait time and modeling use, in 90% of observed interactions.  12   PEER TRAINING  Peers will engage in supported interactions using the AAC device showing undersandings and patience in 3 out of 4 observed opportunities.  12

## 2024-08-23 ENCOUNTER — DOCUMENTATION (OUTPATIENT)
Dept: NEUROLOGY | Facility: CLINIC | Age: 79
End: 2024-08-23
Payer: MEDICARE

## 2024-08-23 NOTE — PROGRESS NOTES
79-year-old female with history of left MCA ischemic stroke 2023.  Etiology cardioembolic.  Patient is requiring continued neurology follow-up. Patient will be transitioned from post discharge stroke clinic  to one of my colleagues in the neurology office to establish long-term follow-up.    Discussed with patient's friend over the phone.    Ney Aparicio MD  Neurology

## 2024-08-29 ENCOUNTER — HOSPITAL ENCOUNTER (OUTPATIENT)
Dept: SPEECH THERAPY | Age: 79
Setting detail: THERAPIES SERIES
Discharge: HOME | End: 2024-08-29
Attending: INTERNAL MEDICINE
Payer: MEDICARE

## 2024-08-29 DIAGNOSIS — I63.9 CEREBROVASCULAR ACCIDENT (CVA), UNSPECIFIED MECHANISM (CMS/HCC): ICD-10-CM

## 2024-08-29 DIAGNOSIS — R47.01 APHASIA: Primary | ICD-10-CM

## 2024-08-29 PROCEDURE — 92605 EX FOR NONSPEECH DEVICE RX: CPT | Mod: GN

## 2024-08-29 PROCEDURE — 92609 USE OF SPEECH DEVICE SERVICE: CPT | Mod: GN

## 2024-08-29 NOTE — PROGRESS NOTES
Speech-Language Pathology Visit      SLP DAILY NOTE FOR OUTPATIENT THERAPY    Patient: Jennifer Sams   MRN: [id number]  : 1945 79 y.o.  Referring Physician: Ney Aparicio,*  Date of Visit: 2024      Certification Dates: 24 through 10/30/24    Diagnosis:   1. Aphasia    2. Cerebrovascular accident (CVA), unspecified mechanism (CMS/HCC)        Chief Complaints:  Aphasia, severe expressive- AAC device    Precautions:  Precautions Comments: Aphasia       TODAY'S VISIT:    Session Time:  Start Time: 1604  Stop Time: 1706  Time Calculation (min): 62 min   History/Vitals/Pain/Encounter Info - 24 1607          Injury History/Precautions/Daily Required Info    Primary Therapist Magnolia Lundy MS, CCC/SLP     Chief Complaint/Reason for Visit  Aphasia, severe expressive- AAC device     Onset of Illness/Injury or Date of Surgery 24     Referring Physician Dr. Aparicio     Precautions Comments Aphasia     Limitations/Impairments vision difficulty;difficulty hearing;safety/cognitive;other (see comments)     Document Type daily treatment     Patient/Family/Caregiver Comments/Observations Pt attended with Thelma BECERRIL     Patient reported fall since last visit No        Pain Assessment    Currently in pain No/Denies                    Daily Treatment Assessment and Plan - 24 1607          Daily Treatment Assessment and Plan    Plan and Recommendations follow up witith the  folder and ordering a chocolate martini                      OBJECTIVE MEASUREMENTS TAKEN TODAY:    None Taken    Today's Treatment:    COGNITION FLOW SHEET  LANGUAGE  CPT 67837   PN: 2024  POC: 10/30/2024 2024     SHORT TERM GOALS PROGRESS TOWARD GOALS CURRENT SESSION   POA will demonstrate Operational Competence- Supervision, pt with min A  Powering the device on/off  Adjusting the volume  Accessing the information on the screen; visual field scanning, direct selection, proprioceptive  feedback and forced calibration  Developing proficiency in using the system efficiently Patient:  Pt demonstrated this date.Powering the device on/off Mod I, difficulty recalling how to turn off, Min cues    Adjusting the volume- Min cues    Talk  screen. (S)    Pt demonstrating scanning of visual field, direct selection and proprioceptive feedback.      At times pt notes with swiping attempt to move screens rather than selecting pages.     Pt demonstrated this date.Powering the device on/off Min A    Adjusting the volume- Mod A    Accessing the information on the screen-4/4   visual field scanning- F6  direct selection: accurate  proprioceptive feedback and forced calibration- appropriate    Power on: VC- Mod A  Put up stand-(S)  Volume: (S)  Visual field- increased to 6- pt is able to select  Improved proprioception feedback and forced calibration using left hand- (S)  Set control settings to left and folders to right.  Patient:  Pt demonstrated this date.Powering the device on/off Mod I,  turn off, S    Talk  screen. (I)    Pt demonstrating scanning of visual field, direct selection and proprioceptive feedback.      At times pt notes with swiping attempt to move screens rather than selecting pages. Mod A   INITIATE COMMUNICATION:  Pt will use the AAC device to initiate a conversation 3 out of 5 opportunities across natural and simulated settings, min A   (friends, healthcare provider, stranger, neighbor, member of community)  Pt is not initiating communication with the device.  Added a folder for her friend César and started conversation starters.     Added a folder in places to eat- for wanting to stay home- eat BBQ    Initiated set up to initiate conversation with X2 friends.  Will add another friend for total of 3.  Encouraged to practice that selection to initiate over next few days.    Also will practice dining and eating folders to select what she would like to eat.  Practice Fast Talk- to share needs more  "time to respond    Practiced 4 pages deep to share had a stroke and has aphasia and uses device to talk      Used 3 pages to select groceries- [employer]- [employer] foods will need.     SYMBOL RECOGNITION  Pt will correctly identify and select symbols or icons on the AAC devcie for:  basic needs  Orientation  feelings/frustrations  novel responses  share information   with 80% accuracy, min A Visited cards briefly and encouraged pt and friend to explore folders for:  Orientation  Feelings/frustrations    Added folder in interest- I like horror movies- Eddie Strickland movies    Added to pt's story that she went to [facility].     Brainstorm basic needs orientation and feelings. Added feelings: mad, annoyed, nervous, happy, satisfied, sad, lonely, tired, frustrated- pt noted with difficulty selecting appropriate icon to de    Added folders for Cats, Marisa and Loan     Added feelings-frustration this date.    Practiced 'other icons\" to explore and recognize vocabulary   LANGUAGE SYSTEMS   Pt will use single meaning folders and cards within TouchChat to communicate in single words, single meaning prepared scripts, and syntax phrases, min A.  Single words, prepared scripts.      Not using syntax at this time, however, practiced   \"I don't like...\"  \"Tomatoes.     Demonstrated taking a photo to label a folder/card, discussed recorded narrative or script     Practicing learning single words and simple scripts with single icons.  Single words, prepared scripts.      Not using syntax at this time, however, practiced   \"I don't like...\"  \"Tomatoes.    IMPROVING ACCURACY  Pt will improve their accuracy in selecting the correct symbols on the SGD device by 20% over the next 3 months.          Education/ Strategy Training:  Education provided: discussing that device is to supplement language as an alternative means.  Discussed with POA that although device may stimulate language to continue to expand, it's purpose is to augment language " "skills.     will benefit from further education/training  Encouraged POA to make a file for the  (name- pt's preferred ) and make an icon that says, \"I would like a chocolate martini.\"    Verbalized understanding   Other: Therapist to consult with [employer] consultant re:  Setting up calendar to discuss orientation questions.   Setting up digits for 10's, 100's value.   Inability to access internet  Option for print larger when making icons,  If pt can say words, (ie yes, no, hello, goodbye) should we replace or remove Pt is progressing with use and would benefit from device.       Long Term Goals   Time Frame    Pt will improve FCM in the following areas:  Augmentative/Alternative Communication  Current: 1  Goal: 3-4    12    EXPANDING VOCABULARY  The pt will use the AAC device to expand their functional vocabulary, adding and using 10 new words or phrases appropriately in daily communication  12    SOCIAL INTERRACTION  The pt will use the SGD device to participate in social interactions, such as greeting peers, asking questions, and making comments, in 80% of observed opportunities over a 3  month period  12    SIMPLE PHRASE CONSTRUCTION  The pt will use the SGD device to construct and communicate simple phrases in 70% of appropriate contexts  12    INDEPENDENCE  The pt will use the AAC device independently in a variety of settings (home and community)for different purposes  (social, personal) with 90% success over a 3 month period  12    DAILY ROUTINES  Pt will use the SGD device to communicate during daily routines with minimal prompting in 80% of opportunities  12    REPAIRING COMMUNICATION BREAKDOWNS  Pt esequiel use the SGD device to repair communication breakdowns in 70% of instnaces when misunderstandings occur 12   Pt supports will demonstrate appropriate stratiegies to support pts use of the SGD device, such as providing wait time and modeling use, in 90% of observed interactions.  12 "   PEER TRAINING  Peers will engage in supported interactions using the AAC device showing undersandings and patience in 3 out of 4 observed opportunities.  12

## 2024-08-29 NOTE — OP SLP TREATMENT LOG
COGNITION FLOW SHEET  LANGUAGE  [facility] [id number]   PN: 8/30/2024  POC: 10/30/2024 8/29/2024     SHORT TERM GOALS PROGRESS TOWARD GOALS CURRENT SESSION   POA will demonstrate Operational Competence- Supervision, pt with min A  Powering the device on/off  Adjusting the volume  Accessing the information on the screen; visual field scanning, direct selection, proprioceptive feedback and forced calibration  Developing proficiency in using the system efficiently Patient:  Pt demonstrated this date.Powering the device on/off Mod I, difficulty recalling how to turn off, Min cues    Adjusting the volume- Min cues    Talk  screen. (S)    Pt demonstrating scanning of visual field, direct selection and proprioceptive feedback.      At times pt notes with swiping attempt to move screens rather than selecting pages.     Pt demonstrated this date.Powering the device on/off Min A    Adjusting the volume- Mod A    Accessing the information on the screen-4/4   visual field scanning- F6  direct selection: accurate  proprioceptive feedback and forced calibration- appropriate    Power on: VC- Mod A  Put up stand-(S)  Volume: (S)  Visual field- increased to 6- pt is able to select  Improved proprioception feedback and forced calibration using left hand- (S)  Set control settings to left and folders to right.  Patient:  Pt demonstrated this date.Powering the device on/off Mod I,  turn off, S    Talk  screen. (I)    Pt demonstrating scanning of visual field, direct selection and proprioceptive feedback.      At times pt notes with swiping attempt to move screens rather than selecting pages. Mod A   INITIATE COMMUNICATION:  Pt will use the AAC device to initiate a conversation 3 out of 5 opportunities across natural and simulated settings, min A   (friends, healthcare provider, stranger, neighbor, member of community)  Pt is not initiating communication with the device.  Added a folder for her friend César and started conversation starters.  "    Added a folder in places to eat- for wanting to stay home- eat BBQ    Initiated set up to initiate conversation with X2 friends.  Will add another friend for total of 3.  Encouraged to practice that selection to initiate over next few days.    Also will practice dining and eating folders to select what she would like to eat.  Practice Fast Talk- to share needs more time to respond    Practiced 4 pages deep to share had a stroke and has aphasia and uses device to talk      Used 3 pages to select groceries- [employer]- [employer] foods will need.     SYMBOL RECOGNITION  Pt will correctly identify and select symbols or icons on the AAC devcie for:  basic needs  Orientation  feelings/frustrations  novel responses  share information   with 80% accuracy, min A Visited cards briefly and encouraged pt and friend to explore folders for:  Orientation  Feelings/frustrations    Added folder in interest- I like horror movies- Vinckelle Strickland movies    Added to pt's story that she went to [facility].     Brainstorm basic needs orientation and feelings. Added feelings: mad, annoyed, nervous, happy, satisfied, sad, lonely, tired, frustrated- pt noted with difficulty selecting appropriate icon to de    Added folders for Cats, Marisa and Loan     Added feelings-frustration this date.    Practiced 'other icons\" to explore and recognize vocabulary   LANGUAGE SYSTEMS   Pt will use single meaning folders and cards within TouchChat to communicate in single words, single meaning prepared scripts, and syntax phrases, min A.  Single words, prepared scripts.      Not using syntax at this time, however, practiced   \"I don't like...\"  \"Tomatoes.     Demonstrated taking a photo to label a folder/card, discussed recorded narrative or script     Practicing learning single words and simple scripts with single icons.  Single words, prepared scripts.      Not using syntax at this time, however, practiced   \"I don't like...\"  \"Tomatoes.    IMPROVING " "ACCURACY  Pt will improve their accuracy in selecting the correct symbols on the SGD device by 20% over the next 3 months.          Education/ Strategy Training:  Education provided: discussing that device is to supplement language as an alternative means.  Discussed with POA that although device may stimulate language to continue to expand, it's purpose is to augment language skills.     will benefit from further education/training  Encouraged POA to make a file for the  (name- pt's preferred ) and make an icon that says, \"I would like a chocolate martini.\"    Verbalized understanding   Other: Therapist to consult with [employer] consultant re:  Setting up calendar to discuss orientation questions.   Setting up digits for 10's, 100's value.   Inability to access internet  Option for print larger when making icons,  If pt can say words, (ie yes, no, hello, goodbye) should we replace or remove Pt is progressing with use and would benefit from device.       Long Term Goals   Time Frame    Pt will improve FCM in the following areas:  Augmentative/Alternative Communication  Current: 1  Goal: 3-4    12    EXPANDING VOCABULARY  The pt will use the AAC device to expand their functional vocabulary, adding and using 10 new words or phrases appropriately in daily communication  12    SOCIAL INTERRACTION  The pt will use the SGD device to participate in social interactions, such as greeting peers, asking questions, and making comments, in 80% of observed opportunities over a 3  month period  12    SIMPLE PHRASE CONSTRUCTION  The pt will use the SGD device to construct and communicate simple phrases in 70% of appropriate contexts  12    INDEPENDENCE  The pt will use the AAC device independently in a variety of settings (home and community)for different purposes  (social, personal) with 90% success over a 3 month period  12    DAILY ROUTINES  Pt will use the SGD device to communicate during daily routines with " minimal prompting in 80% of opportunities  12    REPAIRING COMMUNICATION BREAKDOWNS  Pt esequiel use the SGD device to repair communication breakdowns in 70% of instnaces when misunderstandings occur 12   Pt supports will demonstrate appropriate stratiegies to support pts use of the SGD device, such as providing wait time and modeling use, in 90% of observed interactions.  12   PEER TRAINING  Peers will engage in supported interactions using the AAC device showing undersandings and patience in 3 out of 4 observed opportunities.  12

## 2024-09-03 ENCOUNTER — HOSPITAL ENCOUNTER (OUTPATIENT)
Dept: SPEECH THERAPY | Age: 79
Setting detail: THERAPIES SERIES
Discharge: HOME | End: 2024-09-03
Attending: INTERNAL MEDICINE
Payer: MEDICARE

## 2024-09-03 DIAGNOSIS — R47.01 APHASIA: Primary | ICD-10-CM

## 2024-09-03 PROCEDURE — 92609 USE OF SPEECH DEVICE SERVICE: CPT | Mod: GN,KX

## 2024-09-03 NOTE — OP SLP TREATMENT LOG
COGNITION FLOW SHEET  LANGUAGE  [facility] [id number]   PN: 8/30/2024  POC: 10/30/2024 9/3/2024     SHORT TERM GOALS PROGRESS TOWARD GOALS CURRENT SESSION   POA will demonstrate Operational Competence- Supervision, pt with min A  Powering the device on/off  Adjusting the volume  Accessing the information on the screen; visual field scanning, direct selection, proprioceptive feedback and forced calibration  Developing proficiency in using the system efficiently Patient:  Pt demonstrated this date.Powering the device on/off Mod I, difficulty recalling how to turn off, Min cues    Adjusting the volume- Min cues    Talk  screen. (S)    Pt demonstrating scanning of visual field, direct selection and proprioceptive feedback.      At times pt notes with swiping attempt to move screens rather than selecting pages.     Pt demonstrated this date.Powering the device on/off Min A    Adjusting the volume- Mod A    Accessing the information on the screen-4/4   visual field scanning- F6  direct selection: accurate  proprioceptive feedback and forced calibration- appropriate    Power on: VC- Mod A  Put up stand-(S)  Volume: (S)  Visual field- increased to 6- pt is able to select  Improved proprioception feedback and forced calibration using left hand- (S)  Set control settings to left and folders to right.  Patient:  Pt demonstrated this date.Powering the device on/off S    Talk  screen. (I)    Pt demonstrating scanning of visual field, direct selection and proprioceptive feedback. (S)     At times pt notes with swiping attempt to move screens rather than selecting pages. Min A   INITIATE COMMUNICATION:  Pt will use the AAC device to initiate a conversation 3 out of 5 opportunities across natural and simulated settings, min A   (friends, healthcare provider, stranger, neighbor, member of community)  Practice Fast Talk- to share needs more time to respond    Practiced 4 pages deep to share had a stroke and has aphasia and uses device to talk   "    Used 3 pages to select groceries- [employer]- [employer] foods will need.    Practice Fast Talk- to share needs more time to respond    Practiced 4 pages deep to share had a stroke and has aphasia and uses device to talk      Used 3 pages to select groceries- [employer]- [employer] will need.  Pt is not initiating communication with the device.  Added a folder for her friend César and started conversation starters.     Added a folder in places to eat- for wanting to stay home- eat BBQ    Initiated set up to initiate conversation with X2 friends.  Will add another friend for total of 3.  Encouraged to practice that selection to initiate over next few days.    Also will practice dining and eating folders to select what she would like to eat.  Added icon to describe pain often experienced on right side of body and how to share intensity of the pain using a pain scale.         SYMBOL RECOGNITION  Pt will correctly identify and select symbols or icons on the AAC devcie for:  basic needs  Orientation  feelings/frustrations  novel responses  share information   with 80% accuracy, min A Added feelings: mad, annoyed, nervous, happy, satisfied, sad, lonely, tired, frustrated- pt noted with difficulty selecting appropriate icon to de    Added folders for Cats, Marisa and Loan    Visited cards briefly and encouraged pt and friend to explore folders for:  Orientation  Feelings/frustrations    Added folder in interest- I like horror movies- Vincent Strickland movies    Added to pt's story that she went to [facility].     Brainstorm basic needs orientation and feelings. Explored additional medical descriptions     Explored orientation responses.                Added feelings-frustration this date.    Practiced 'other icons\" to explore and recognize vocabulary   LANGUAGE SYSTEMS   Pt will use single meaning folders and cards within [employer] to communicate in single words, single meaning prepared scripts, and syntax phrases, min A.  Single words, " "prepared scripts.      Not using syntax at this time, however, practiced   \"I don't like...\"  \"Tomatoes.     Demonstrated taking a photo to label a folder/card, discussed recorded narrative or script     Practicing learning single words and simple scripts with single icons.  Single words, prepared scripts.    Paired 2 icons for syntax       IMPROVING ACCURACY  Pt will improve their accuracy in selecting the correct symbols on the SGD device by 20% over the next 3 months.  F6- Mod I        Education/ Strategy Training:  Education provided: discussing that device is to supplement language as an alternative means.  Discussed with POA that although device may stimulate language to continue to expand, it's purpose is to augment language skills.     will benefit from further education/training  Encouraged POA to make a file for the  (name- pt's preferred ) and make an icon that says, \"I would like a chocolate martini.\"    Verbalized understanding   Other: Therapist to consult with [employer] consultant re:  Setting up calendar to discuss orientation questions.   Setting up digits for 10's, 100's value.   Inability to access internet  Option for print larger when making icons,  If pt can say words, (ie yes, no, hello, goodbye) should we replace or remove Pt is progressing with use and would benefit from device.       Long Term Goals   Time Frame    Pt will improve FCM in the following areas:  Augmentative/Alternative Communication  Current: 1  Goal: 3-4    12    EXPANDING VOCABULARY  The pt will use the AAC device to expand their functional vocabulary, adding and using 10 new words or phrases appropriately in daily communication  12    SOCIAL INTERRACTION  The pt will use the SGD device to participate in social interactions, such as greeting peers, asking questions, and making comments, in 80% of observed opportunities over a 3  month period  12    SIMPLE PHRASE CONSTRUCTION  The pt will use the SGD " device to construct and communicate simple phrases in 70% of appropriate contexts  12    INDEPENDENCE  The pt will use the AAC device independently in a variety of settings (home and community)for different purposes  (social, personal) with 90% success over a 3 month period  12    DAILY ROUTINES  Pt will use the SGD device to communicate during daily routines with minimal prompting in 80% of opportunities  12    REPAIRING COMMUNICATION BREAKDOWNS  Pt esequiel use the SGD device to repair communication breakdowns in 70% of instnaces when misunderstandings occur 12   Pt supports will demonstrate appropriate stratiegies to support pts use of the SGD device, such as providing wait time and modeling use, in 90% of observed interactions.  12   PEER TRAINING  Peers will engage in supported interactions using the AAC device showing undersandings and patience in 3 out of 4 observed opportunities.  12

## 2024-09-04 NOTE — PROGRESS NOTES
Speech-Language Pathology Progress Note      SLP PROGRESS NOTE FOR OUTPATIENT THERAPY    Patient: Jennifer Sams MRN: [id number]  : 1945 79 y.o.  Referring Physician: Ney Aparicio,*  Date of Visit: 9/3/2024      Certification Dates: 24 through 10/30/24    Recommended Frequency & Duration:  2 times/week for up to 3 months   PN: 2024    Diagnosis:   1. Aphasia        Chief Complaints:  Chief Complaint   Patient presents with    Speech Problem     Aphasia and motor speech difficulty, supplemented with AAC device       Precautions:   Precautions Comments: Aphasia    TODAY'S VISIT:    Session Time:  Start Time: 1602  Stop Time: 1702  Time Calculation (min): 60 min   General Information - 24 1608          Session Details    Document Type progress note        General Information    Onset of Illness/Injury or Date of Surgery 24     Referring Physician Dr. Aparicio     History of present illness/functional impairment  is a 79 y.o. female who presented to [facility] on  after she was not seen by her friends for a few days.  Ms. Sams lives alone and when she was found by EMS she was confused and combative.  In the emergency department she was aphasic with right-sided weakness and hypertensive to the 190 systolic.  CT scan of the head revealed an acute infarct in left MCA with mild bleeding.  Echo showed an EF of 43% and a bubble study was negative.  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 Pt transferred to  [facility] on 2023 Principal problem  Acute ischemic left MCA stroke (CMS/HCC). PMH AAA, heart block s/p cardiac pacemaker, glaucoma, hyperlipidemia, coronary artery disease,  Transferred to outpatient and evaluated at [facility] on 2024. Following spech and language therapy over the last 10 months Ms. Sams returns to speech therapy for a trial of an AAC device to aid in communication and further support language and  "speech remediation.     Precautions Comments Aphasia     Limitations/Impairments vision difficulty;difficulty hearing;safety/cognitive;other (see comments)     Interventions Language and speech impairment- learning AAC device     Patient/Family/Caregiver Comments/Observations Thelma BECERRIL in attendance.  Pt observed with shuffling feet, reporting heavy feeling limbs.                    Daily Falls Screen - 09/03/24 2037          Daily Falls Assessment    Patient reported fall since last visit No                    Pain and Vitals - 09/03/24 2037          Pain Assessment    Currently in pain Yes     Preferred Pain Scale FACES ([place] FACES Pain Rating Scale)     Pain Side/Orientation right     Pain: Body location Leg;Arm   pt reports heavy, weighted, electrical tingling    FACES Pain Rating: Activity 8-->hurts whole lot   walking       Pain Interventions    Intervention none-     Post Intervention Comments none                    SLP - 09/03/24 1608          Speech Therapy    Speech Therapy Specialty Traditional Neuro Program    AAC       SLP Frequency and Duration    Frequency of treatment 2 times/week     Speech Duration 3 months     SLP Custom Frequency and Duration PN: 8/30/2024     SLP Cert From 07/31/24     SLP Cert To 10/30/24     Date SLP POC was sent to provider 07/31/24     Signed SLP Plan of Care received?  Yes                    Assessment - 09/03/24 1608          Assessment    Clinical Assessment Ms. Sams who exhibits Moderate motor speech deficits and moderate expressive an receptive aphasia with limitations in reading and writing presents for a progress report on orientation and advancement with use of an AAC-SGD.  Ms. Sams is beginning to express more ownership of the device, carrying the device to and from session.  She demonstrates improved independence with turning the device on, adjusting volume, accessing the \"talk\" section, and improving proprioceptive feedback and forced calibration for " activating icons and folders.  Ms. Sams demonstrates independent intiation in selecting a folder vs an icon when programing communication and will provide personal feedback in scripted discourse for personal self expression.  Ms. Sams demonstrates carryover of newly explored categories and symbols, but can be observed waiting for encouragement to use SGD to respond or comment in communication exchange. Ms. Sams will continue to benefit from speech therapy for use of AAC-SGC for vocab expansion, social interraction, syntax, repairing communication breakdowns.     Plan and Recommendations Consider using a scavenger hunt to practice finding placed items through page depths.  Simple phrase construction through page depth. Communicate daily routines- vocabulary.     [employer] CPT 91435 Speech Lang Voice Comm and/or Auditory Proc (Treatment of speech, language, voice, communication and/or hearing processing disorder)                     OBJECTIVE MEASUREMENTS/DATA:    None Taken    Outcome Measures          3/12/2024    20:12 5/14/2024    21:32 7/9/2024    15:08 7/31/2024    20:38 9/4/2024    09:14   SLP Outcome Measures   FCM: Augmentative/Alternative Communication    1-->Level 1 2-->Level 2   FCM: Motor Speech 4-->Level 4 4-->Level 4       approaching 5 4-->Level 4 4-->Level 4    FCM: Reading 4-->Level 4 5-->Level 5 5-->Level 5 5-->Level 5    FCM: Spoken Language, Comprehension 5-->Level 5 5-->Level 5 5-->Level 5 5-->Level 5    FCM: Spoken Language, Expression 4-->Level 4 4-->Level 4 4-->Level 4 4-->Level 4        Today's Treatment:    Education provided:  Yes: See treatment log for details of education provided    COGNITION FLOW SHEET  LANGUAGE  CPT 68554   PN: 8/30/2024  POC: 10/30/2024 9/3/2024     SHORT TERM GOALS PROGRESS TOWARD GOALS CURRENT SESSION   POA will demonstrate Operational Competence- Supervision, pt with min A  Powering the device on/off  Adjusting the volume  Accessing the information on the  screen; visual field scanning, direct selection, proprioceptive feedback and forced calibration  Developing proficiency in using the system efficiently Patient:  Pt demonstrated this date.Powering the device on/off Mod I, difficulty recalling how to turn off, Min cues    Adjusting the volume- Min cues    Talk  screen. (S)    Pt demonstrating scanning of visual field, direct selection and proprioceptive feedback.      At times pt notes with swiping attempt to move screens rather than selecting pages.     Pt demonstrated this date.Powering the device on/off Min A    Adjusting the volume- Mod A    Accessing the information on the screen-4/4   visual field scanning- F6  direct selection: accurate  proprioceptive feedback and forced calibration- appropriate    Power on: VC- Mod A  Put up stand-(S)  Volume: (S)  Visual field- increased to 6- pt is able to select  Improved proprioception feedback and forced calibration using left hand- (S)  Set control settings to left and folders to right.  Patient:  Pt demonstrated this date.Powering the device on/off S    Talk  screen. (I)    Pt demonstrating scanning of visual field, direct selection and proprioceptive feedback. (S)     At times pt notes with swiping attempt to move screens rather than selecting pages. Min A   INITIATE COMMUNICATION:  Pt will use the AAC device to initiate a conversation 3 out of 5 opportunities across natural and simulated settings, min A   (friends, healthcare provider, stranger, neighbor, member of community)  Practice Fast Talk- to share needs more time to respond    Practiced 4 pages deep to share had a stroke and has aphasia and uses device to talk      Used 3 pages to select groceries- [employer]- [employer] will need.    Practice Fast Talk- to share needs more time to respond    Practiced 4 pages deep to share had a stroke and has aphasia and uses device to talk      Used 3 pages to select groceries- [employer]- [employer] will need.  Pt is not initiating  "communication with the device.  Added a folder for her friend César and started conversation starters.     Added a folder in places to eat- for wanting to stay home- eat BBQ    Initiated set up to initiate conversation with X2 friends.  Will add another friend for total of 3.  Encouraged to practice that selection to initiate over next few days.    Also will practice dining and eating folders to select what she would like to eat.  Added icon to describe pain often experienced on right side of body and how to share intensity of the pain using a pain scale.         SYMBOL RECOGNITION  Pt will correctly identify and select symbols or icons on the AAC devcie for:  basic needs  Orientation  feelings/frustrations  novel responses  share information   with 80% accuracy, min A Added feelings: mad, annoyed, nervous, happy, satisfied, sad, lonely, tired, frustrated- pt noted with difficulty selecting appropriate icon to de    Added folders for Cats, Marisa and Loan    Visited cards briefly and encouraged pt and friend to explore folders for:  Orientation  Feelings/frustrations    Added folder in interest- I like horror movies- Vincent Strickland movies    Added to pt's story that she went to [facility].     Brainstorm basic needs orientation and feelings. Explored additional medical descriptions     Explored orientation responses.                Added feelings-frustration this date.    Practiced 'other icons\" to explore and recognize vocabulary   LANGUAGE SYSTEMS   Pt will use single meaning folders and cards within [employer] to communicate in single words, single meaning prepared scripts, and syntax phrases, min A.  Single words, prepared scripts.      Not using syntax at this time, however, practiced   \"I don't like...\"  \"Tomatoes.     Demonstrated taking a photo to label a folder/card, discussed recorded narrative or script     Practicing learning single words and simple scripts with single icons.  Single words, prepared " "scripts.    Paired 2 icons for syntax       IMPROVING ACCURACY  Pt will improve their accuracy in selecting the correct symbols on the SGD device by 20% over the next 3 months.  F6- Mod I        Education/ Strategy Training:  Education provided: discussing that device is to supplement language as an alternative means.  Discussed with POA that although device may stimulate language to continue to expand, it's purpose is to augment language skills.     will benefit from further education/training  Encouraged POA to make a file for the  (name- pt's preferred ) and make an icon that says, \"I would like a chocolate martini.\"    Verbalized understanding   Other: Therapist to consult with [employer] consultant re:  Setting up calendar to discuss orientation questions.   Setting up digits for 10's, 100's value.   Inability to access internet  Option for print larger when making icons,  If pt can say words, (ie yes, no, hello, goodbye) should we replace or remove Pt is progressing with use and would benefit from device.       Long Term Goals   Time Frame    Pt will improve FCM in the following areas:  Augmentative/Alternative Communication  Current: 1  Goal: 3-4    12    EXPANDING VOCABULARY  The pt will use the AAC device to expand their functional vocabulary, adding and using 10 new words or phrases appropriately in daily communication  12    SOCIAL INTERRACTION  The pt will use the SGD device to participate in social interactions, such as greeting peers, asking questions, and making comments, in 80% of observed opportunities over a 3  month period  12    SIMPLE PHRASE CONSTRUCTION  The pt will use the SGD device to construct and communicate simple phrases in 70% of appropriate contexts  12    INDEPENDENCE  The pt will use the AAC device independently in a variety of settings (home and community)for different purposes  (social, personal) with 90% success over a 3 month period  12    DAILY ROUTINES  Pt " will use the SGD device to communicate during daily routines with minimal prompting in 80% of opportunities  12    REPAIRING COMMUNICATION BREAKDOWNS  Pt esequiel use the SGD device to repair communication breakdowns in 70% of instnaces when misunderstandings occur 12   Pt supports will demonstrate appropriate stratiegies to support pts use of the SGD device, such as providing wait time and modeling use, in 90% of observed interactions.  12   PEER TRAINING  Peers will engage in supported interactions using the AAC device showing undersandings and patience in 3 out of 4 observed opportunities.  12         Goals Addressed                   This Visit's Progress     Language- AAC Goal           Long Term Goals   Time Frame  Result  Comment/Progress    Pt will improve FCM in the following areas:  Augmentative/Alternative Communication  Current: 1  Goal: 3-4   12   PN: 9/3/2024  Continue     2   EXPANDING VOCABULARY  The pt will use the AAC device to expand their functional vocabulary, adding and using 10 new words or phrases appropriately in daily communication  12   PN: 9/3/2024  Continue  Pt continues to explore and add topics/categories for daily use:  Folders:  Friends/pets:   Calendar  Pain  Grocery shopping  [facility] story  Personal information  restaurants   SOCIAL INTERRACTION  The pt will use the SGDdevice to participate in social interactions, such as greeting peers, asking questions, and making comments, in 80% of observed opportunities over a 3  month period  12   PN: 9/3/2024    Communication is typically in response with support   SIMPLE PHRASE CONSTRUCTION  The pt will use the SGD device to construct and communicate simple phrases in 70% of appropriate contexts  12   PN: 9/3/2024  Continue  Initiated this date:   I have:    INDEPENDENCE  The pt will use the AAC device independently in a variety of settings (home and community)for different purposes  (social, personal) with 90% success over a 3 month  period  12   PN: 9/3/2024  Continue  Mostly home and with friends. Exploring medical, restaurants.    DAILY ROUTINES  Pt will use the SGD device to communicate during daily routines with minimal prompting in 80% of opportunities  12   PN: 9/3/2024  Continue  improving with support from POA.  Practicing set up to use out in the community.    REPAIRING COMMUNICATION BREAKDOWNS  Pt esequiel use the SGD device to repair communication breakdowns in 70% of instnaces when misunderstandings occur 12     Pt supports will demonstrate appropriate stratiegies to support pts use of the SGD device, such as providing wait time and modeling use, in 90% of observed interactions.  12 PN: 9/3/2024  Continue Pt familiar with icon to share need for extra time and to explain why she uses the device   PEER TRAINING  Peers will engage in supported interactions using the AAC device showing undersandings and patience in 3 out of 4 observed opportunities.  12 PN: 9/3/2024  Continue Meeting.  X2 close friends attend regularly to assist in implementation.       Short Term Goals Time Frame Result Comment/Progress   POA will demonstrate Operational Competence- Supervision, pt with min A  Powering the device on/off  Adjusting the volume  Accessing the information on the screen; visual field scanning, direct selection, proprioceptive feedback and forced calibration  Developing proficiency in using the system efficiently 4 w PN: 9/3/2024  Continue  Min A to supervision,     Developing proficiency in using the system efficiently  Mod -max A   INITIATE COMMUNICATION:  Pt will use the AAC device to initiate a conversation 3 out of 5 opportunities across natural and simulated settings, min A   (friends, healthcare provider, stranger, neighbor, member of community)  8 w PN: 9/3/2024  Continue Mod to max A   SYMBOL RECOGNITION  Pt will correctly identify and select symbols or icons on the AAC devcie for:  basic needs  Orientation  feelings/frustrations  novel  responses  share information   with 80% accuracy, min A 8 w PN: 9/3/2024  Continue basic needs  Orientation  feelings/frustrations  Min to mod A    novel responses  share information - max A   LANGUAGE SYSTEMS   Pt will use single meaning folders and cards within [employer] to communicate in single words, single meaning prepared scripts, and syntax phrases, min A.  12 w PN: 9/3/2024  Continue Mod A   IMPROVING ACCURACY  Pt will improve their accuracy in selecting the correct symbols on the SGD device by 20% over the next 3 months.  8 w PN: 9/3/2024  Continue Min to mod A               FCM AAC  LEVEL 2: The individual attempts to communicate rote/automatic messages (e.g. waving hello when greeted, responding to name). With consistent, maximal cueing and additional time, the individual can use augmentative-alternative communication to convey simple messages related to personal wants/needs with familiar communication partners. However, communication attempts are rarely accurate or meaningful and the communication partner must assume re sponsibility for structuring all communication exchanges.

## 2024-09-10 ENCOUNTER — TELEPHONE (OUTPATIENT)
Dept: NEUROLOGY | Facility: CLINIC | Age: 79
End: 2024-09-10
Payer: MEDICARE

## 2024-09-10 NOTE — TELEPHONE ENCOUNTER
Left a message to the patient friend Thelma to schedule a follow-up appointment with Dr[facility].

## 2024-09-17 ENCOUNTER — HOSPITAL ENCOUNTER (OUTPATIENT)
Dept: SPEECH THERAPY | Age: 79
Setting detail: THERAPIES SERIES
Discharge: HOME | End: 2024-09-17
Attending: INTERNAL MEDICINE
Payer: MEDICARE

## 2024-09-17 DIAGNOSIS — I63.9 CEREBROVASCULAR ACCIDENT (CVA), UNSPECIFIED MECHANISM (CMS/HCC): ICD-10-CM

## 2024-09-17 DIAGNOSIS — R47.01 APHASIA: Primary | ICD-10-CM

## 2024-09-17 PROCEDURE — 92609 USE OF SPEECH DEVICE SERVICE: CPT | Mod: GN

## 2024-09-17 NOTE — OP SLP TREATMENT LOG
COGNITION FLOW SHEET  LANGUAGE  [facility], [place] [address]   PN: 8/30/2024  POC: 10/30/2024 9/17/2024     SHORT TERM GOALS PROGRESS TOWARD GOALS CURRENT SESSION   POA will demonstrate Operational Competence- Supervision, pt with min A  Powering the device on/off  Adjusting the volume  Accessing the information on the screen; visual field scanning, direct selection, proprioceptive feedback and forced calibration  Developing proficiency in using the system efficiently Patient:  Pt demonstrated this date.Powering the device on/off S    Talk  screen. (I)    Pt demonstrating scanning of visual field, direct selection and proprioceptive feedback. (S)     At times pt notes with swiping attempt to move screens rather than selecting pages. Min A Increased difficulty observed this date suspect secondary to decreased impulse control, increased frustration.  Pt with reduced ability to select icon, refusing to use stylus options.    INITIATE COMMUNICATION:  Pt will use the AAC device to initiate a conversation 3 out of 5 opportunities across natural and simulated settings, min A   (friends, healthcare provider, stranger, neighbor, member of community)  Added icon to describe pain often experienced on right side of body and how to share intensity of the pain using a pain scale.    Practice Fast Talk- to share needs more time to respond    Practiced 4 pages deep to share had a stroke and has aphasia and uses device to talk      Used 3 pages to select groceries- [employer]- [employer] foods will need.    Practice Fast Talk- to share needs more time to respond    Practiced 4 pages deep to share had a stroke and has aphasia and uses device to talk      Used 3 pages to select groceries- [employer]- [employer] will need.  Pt is not initiating communication with the device.  Added a folder for her friend César and started conversation starters.     Added a folder in places to eat- for wanting to stay home- eat BBQ    Initiated set up to initiate conversation with X2  "friends.  Will add another friend for total of 3.  Encouraged to practice that selection to initiate over next few days.    Also will practice dining and eating folders to select what she would like to eat.           SYMBOL RECOGNITION  Pt will correctly identify and select symbols or icons on the AAC devcie for:  basic needs  Orientation  feelings/frustrations  novel responses  share information   with 80% accuracy, min A Explored additional medical descriptions     Explored orientation responses.      Added feelings-frustration this date.    Practiced 'other icons\" to explore and recognize vocabulary    Added feelings: mad, annoyed, nervous, happy, satisfied, sad, lonely, tired, frustrated- pt noted with difficulty selecting appropriate icon to de    Added folders for Cats, Marisa and Loan    Visited cards briefly and encouraged pt and friend to explore folders for:  Orientation  Feelings/frustrations    Added folder in interest- I like horror movies- Vinckelle Strickland movies    Added to pt's story that she went to [facility].     Brainstorm basic needs orientation and feelings.    LANGUAGE SYSTEMS   Pt will use single meaning folders and cards within TouchChat to communicate in single words, single meaning prepared scripts, and syntax phrases, min A.  Single words, prepared scripts.    Paired 2 icons for syntax    Single words, prepared scripts.      Not using syntax at this time, however, practiced   \"I don't like...\"  \"Tomatoes.     Demonstrated taking a photo to label a folder/card, discussed recorded narrative or script     Practicing learning single words and simple scripts with single icons.         IMPROVING ACCURACY  Pt will improve their accuracy in selecting the correct symbols on the SGD device by 20% over the next 3 months. F6- Mod I      Pt received own device this date with large font keyboard, bluetooth keyboard and mouse and stylus and weighted stylus    Pt exhibited interest in therapy ted.  " "Reduced response to cues from therapist to promote speech   Education/ Strategy Training:  Encouraged POA to make a file for the  (name- pt's preferred ) and make an icon that says, \"I would like a chocolate martini.\"    Verbalized understanding    Education provided: discussing that device is to supplement language as an alternative means.  Discussed with POA that although device may stimulate language to continue to expand, it's purpose is to augment language skills.     will benefit from further education/training  Discussed some maladaptive behaviors and reduced self regulation resulting in maladaptive behaviors.     Thelma encouraged not to engage in identified behaviors and encourage strategies for self regulation in order to build adaptive skills.      Thelma verbalized understanding.    Other: Therapist to consult with [employer] consultant re:  Setting up calendar to discuss orientation questions.   Setting up digits for 10's, 100's value.   Inability to access internet  Option for print larger when making icons,  If pt can say words, (ie yes, no, hello, goodbye) should we replace or remove Pt is progressing with use and would benefit from device.       Long Term Goals   Time Frame    Pt will improve FCM in the following areas:  Augmentative/Alternative Communication  Current: 1  Goal: 3-4    12    EXPANDING VOCABULARY  The pt will use the AAC device to expand their functional vocabulary, adding and using 10 new words or phrases appropriately in daily communication  12    SOCIAL INTERRACTION  The pt will use the SGD device to participate in social interactions, such as greeting peers, asking questions, and making comments, in 80% of observed opportunities over a 3  month period  12    SIMPLE PHRASE CONSTRUCTION  The pt will use the SGD device to construct and communicate simple phrases in 70% of appropriate contexts  12    INDEPENDENCE  The pt will use the AAC device independently in a " variety of settings (home and community)for different purposes  (social, personal) with 90% success over a 3 month period  12    DAILY ROUTINES  Pt will use the SGD device to communicate during daily routines with minimal prompting in 80% of opportunities  12    REPAIRING COMMUNICATION BREAKDOWNS  Pt esequiel use the SGD device to repair communication breakdowns in 70% of instnaces when misunderstandings occur 12   Pt supports will demonstrate appropriate stratiegies to support pts use of the SGD device, such as providing wait time and modeling use, in 90% of observed interactions.  12   PEER TRAINING  Peers will engage in supported interactions using the AAC device showing undersandings and patience in 3 out of 4 observed opportunities.  12

## 2024-09-18 NOTE — PROGRESS NOTES
Speech-Language Pathology Visit      SLP DAILY NOTE FOR OUTPATIENT THERAPY    Patient: Jennifer Sams   MRN: [id number]  : 1945 79 y.o.  Referring Physician: Ney Aparicio,*  Date of Visit: 2024      Certification Dates: 24 through 10/30/24    Diagnosis:   1. Aphasia    2. Cerebrovascular accident (CVA), unspecified mechanism (CMS/HCC)        Chief Complaints:  Aphasia, severe expressive- AAC device    Precautions:  Precautions Comments: Aphasia       TODAY'S VISIT:    Session Time:  Start Time: 1604  Stop Time: 1702  Time Calculation (min): 58 min   History/Vitals/Pain/Encounter Info - 24          Injury History/Precautions/Daily Required Info    Primary Therapist Magnolia Lundy MS, CCC/SLP     Chief Complaint/Reason for Visit  Aphasia, severe expressive- AAC device     Onset of Illness/Injury or Date of Surgery 24     Referring Physician Dr. Aparicio     Precautions Comments Aphasia     Limitations/Impairments vision difficulty;difficulty hearing;safety/cognitive;other (see comments)     Document Type daily treatment     Patient/Family/Caregiver Comments/Observations Thelma reported connecting device to hotspot and exploring therapy icon of [employer]ica reporting pt enjoyed exploring on own.     Patient reported fall since last visit No        Pain Assessment    Currently in pain No/Denies        Pain Interventions    Intervention none     Post Intervention Comments none                    Daily Treatment Assessment and Plan - 24          Daily Treatment Assessment and Plan    Progress toward goals Slower than expected     Daily Outcome Summary Pt received her own device this date.  Enjoyed exploring keyboards, stylus.  Pt noted with reduced effort to utilize clinician cues to access language through repetition. Discussed self regulation, with Thelma this date.  Discouraging behaviors that build maladaptive skills and encouraging behaviors that build  adaptive skill.     Plan and Recommendations return to icons established and ask question with pt to respond through use of icons.                      OBJECTIVE MEASUREMENTS TAKEN TODAY:    None Taken    Today's Treatment:    COGNITION FLOW SHEET  LANGUAGE  [facility] [address]   PN: 8/30/2024  POC: 10/30/2024 9/17/2024     SHORT TERM GOALS PROGRESS TOWARD GOALS CURRENT SESSION   POA will demonstrate Operational Competence- Supervision, pt with min A  Powering the device on/off  Adjusting the volume  Accessing the information on the screen; visual field scanning, direct selection, proprioceptive feedback and forced calibration  Developing proficiency in using the system efficiently Patient:  Pt demonstrated this date.Powering the device on/off S    Talk  screen. (I)    Pt demonstrating scanning of visual field, direct selection and proprioceptive feedback. (S)     At times pt notes with swiping attempt to move screens rather than selecting pages. Min A Increased difficulty observed this date suspect secondary to decreased impulse control, increased frustration.  Pt with reduced ability to select icon, refusing to use stylus options.    INITIATE COMMUNICATION:  Pt will use the AAC device to initiate a conversation 3 out of 5 opportunities across natural and simulated settings, min A   (friends, healthcare provider, stranger, neighbor, member of community)  Added icon to describe pain often experienced on right side of body and how to share intensity of the pain using a pain scale.    Practice Fast Talk- to share needs more time to respond    Practiced 4 pages deep to share had a stroke and has aphasia and uses device to talk      Used 3 pages to select groceries- [employer]- [employer] will need.    Practice Fast Talk- to share needs more time to respond    Practiced 4 pages deep to share had a stroke and has aphasia and uses device to talk      Used 3 pages to select groceries- [employer]- [employer] will need.  Pt is not initiating  "communication with the device.  Added a folder for her friend César and started conversation starters.     Added a folder in places to eat- for wanting to stay home- eat BBQ    Initiated set up to initiate conversation with X2 friends.  Will add another friend for total of 3.  Encouraged to practice that selection to initiate over next few days.    Also will practice dining and eating folders to select what she would like to eat.           SYMBOL RECOGNITION  Pt will correctly identify and select symbols or icons on the AAC devcie for:  basic needs  Orientation  feelings/frustrations  novel responses  share information   with 80% accuracy, min A Explored additional medical descriptions     Explored orientation responses.      Added feelings-frustration this date.    Practiced 'other icons\" to explore and recognize vocabulary    Added feelings: mad, annoyed, nervous, happy, satisfied, sad, lonely, tired, frustrated- pt noted with difficulty selecting appropriate icon to de    Added folders for Cats, Marisa and Loan    Visited cards briefly and encouraged pt and friend to explore folders for:  Orientation  Feelings/frustrations    Added folder in interest- I like horror movies- Vincent Strickland movies    Added to pt's story that she went to [facility].     Brainstorm basic needs orientation and feelings.    LANGUAGE SYSTEMS   Pt will use single meaning folders and cards within TouchChat to communicate in single words, single meaning prepared scripts, and syntax phrases, min A.  Single words, prepared scripts.    Paired 2 icons for syntax    Single words, prepared scripts.      Not using syntax at this time, however, practiced   \"I don't like...\"  \"Tomatoes.     Demonstrated taking a photo to label a folder/card, discussed recorded narrative or script     Practicing learning single words and simple scripts with single icons.         IMPROVING ACCURACY  Pt will improve their accuracy in selecting the correct symbols on " "the SGD device by 20% over the next 3 months. F6- Mod I      Pt received own device this date with large font keyboard, bluetooth keyboard and mouse and stylus and weighted stylus    Pt exhibited interest in therapy ted.  Reduced response to cues from therapist to promote speech   Education/ Strategy Training:  Encouraged POA to make a file for the  (name- pt's preferred ) and make an icon that says, \"I would like a chocolate martini.\"    Verbalized understanding    Education provided: discussing that device is to supplement language as an alternative means.  Discussed with POA that although device may stimulate language to continue to expand, it's purpose is to augment language skills.     will benefit from further education/training  Discussed some maladaptive behaviors and reduced self regulation resulting in maladaptive behaviors.     Thelma encouraged not to engage in identified behaviors and encourage strategies for self regulation in order to build adaptive skills.      Thelma verbalized understanding.    Other: Therapist to consult with [employer] consultant re:  Setting up calendar to discuss orientation questions.   Setting up digits for 10's, 100's value.   Inability to access internet  Option for print larger when making icons,  If pt can say words, (ie yes, no, hello, goodbye) should we replace or remove Pt is progressing with use and would benefit from device.       Long Term Goals   Time Frame    Pt will improve FCM in the following areas:  Augmentative/Alternative Communication  Current: 1  Goal: 3-4    12    EXPANDING VOCABULARY  The pt will use the AAC device to expand their functional vocabulary, adding and using 10 new words or phrases appropriately in daily communication  12    SOCIAL INTERRACTION  The pt will use the SGD device to participate in social interactions, such as greeting peers, asking questions, and making comments, in 80% of observed opportunities over a 3  month " period  12    SIMPLE PHRASE CONSTRUCTION  The pt will use the SGD device to construct and communicate simple phrases in 70% of appropriate contexts  12    INDEPENDENCE  The pt will use the AAC device independently in a variety of settings (home and community)for different purposes  (social, personal) with 90% success over a 3 month period  12    DAILY ROUTINES  Pt will use the SGD device to communicate during daily routines with minimal prompting in 80% of opportunities  12    REPAIRING COMMUNICATION BREAKDOWNS  Pt esequiel use the SGD device to repair communication breakdowns in 70% of instnaces when misunderstandings occur 12   Pt supports will demonstrate appropriate stratiegies to support pts use of the SGD device, such as providing wait time and modeling use, in 90% of observed interactions.  12   PEER TRAINING  Peers will engage in supported interactions using the AAC device showing undersandings and patience in 3 out of 4 observed opportunities.  12

## 2024-09-24 ENCOUNTER — HOSPITAL ENCOUNTER (OUTPATIENT)
Dept: SPEECH THERAPY | Age: 79
Setting detail: THERAPIES SERIES
Discharge: HOME | End: 2024-09-24
Attending: INTERNAL MEDICINE
Payer: MEDICARE

## 2024-09-24 DIAGNOSIS — I63.9 CEREBROVASCULAR ACCIDENT (CVA), UNSPECIFIED MECHANISM (CMS/HCC): ICD-10-CM

## 2024-09-24 DIAGNOSIS — R47.01 APHASIA: Primary | ICD-10-CM

## 2024-09-24 PROCEDURE — 92609 USE OF SPEECH DEVICE SERVICE: CPT | Mod: GN

## 2024-09-24 NOTE — OP SLP TREATMENT LOG
COGNITION FLOW SHEET  LANGUAGE  [facility] [id number]   PN: 8/30/2024  POC: 10/30/2024 9/24/2024     SHORT TERM GOALS PROGRESS TOWARD GOALS CURRENT SESSION   POA will demonstrate Operational Competence- Supervision, pt with min A  Powering the device on/off  Adjusting the volume  Accessing the information on the screen; visual field scanning, direct selection, proprioceptive feedback and forced calibration  Developing proficiency in using the system efficiently   Patient:  Pt demonstrated this date.Powering the device on/off S    Talk  screen. (I)    Pt demonstrating scanning of visual field, direct selection and proprioceptive feedback. (S)     At times pt notes with swiping attempt to move screens rather than selecting pages. Min A Turned on - Mod A  Noted with minimally reduced direct selection and forced calibration Min cues.               INITIATE COMMUNICATION:  Pt will use the AAC device to initiate a conversation 3 out of 5 opportunities across natural and simulated settings, min A   (friends, healthcare provider, stranger, neighbor, member of community)  Added icon to describe pain often experienced on right side of body and how to share intensity of the pain using a pain scale.    Practice Fast Talk- to share needs more time to respond    Practiced 4 pages deep to share had a stroke and has aphasia and uses device to talk      Used 3 pages to select groceries- [employer]- [employer] will need.    Practice Fast Talk- to share needs more time to respond    Practiced 4 pages deep to share had a stroke and has aphasia and uses device to talk      Used 3 pages to select groceries- [employer]- [employer] will need.  Pt is not initiating communication with the device.  Added a folder for her friend César and started conversation starters.     Added a folder in places to eat- for wanting to stay home- eat BBQ    Initiated set up to initiate conversation with X2 friends.  Will add another friend for total of 3.  Encouraged to practice that  "selection to initiate over next few days.    Also will practice dining and eating folders to select what she would like to eat.    Orientation:  Name: Verbal +             AAC : my info Kira CHATTERJEE  Address: Verbal: Verbal: -            AAC: Min A My info  Birthday: Verbal: no            AAC:   Cats: people  Added brief narrative.   Can you please help me:      Vacuum, clean the litter     SYMBOL RECOGNITION  Pt will correctly identify and select symbols or icons on the AAC devcie for:  basic needs  Orientation  feelings/frustrations  novel responses  share information   with 80% accuracy, min A Explored additional medical descriptions     Explored orientation responses.      Added feelings-frustration this date.    Practiced 'other icons\" to explore and recognize vocabulary    Added feelings: mad, annoyed, nervous, happy, satisfied, sad, lonely, tired, frustrated- pt noted with difficulty selecting appropriate icon to de    Added folders for Cats, Marisa and Loan    Visited cards briefly and encouraged pt and friend to explore folders for:  Orientation  Feelings/frustrations    Added folder in interest- I like horror movies- Vincent Strickland movies    Added to pt's story that she went to [facility].     Brainstorm basic needs orientation and feelings.    LANGUAGE SYSTEMS   Pt will use single meaning folders and cards within Touch[employer]ampedt to communicate in single words, single meaning prepared scripts, and syntax phrases, min A.  Single words, prepared scripts.    Paired 2 icons for syntax    Single words, prepared scripts.      Not using syntax at this time, however, practiced   \"I don't like...\"  \"Tomatoes.     Demonstrated taking a photo to label a folder/card, discussed recorded narrative or script     Practicing learning single words and simple scripts with single icons.    Used single meaning words, prepared scripts and 2 icon communication.      IMPROVING ACCURACY  Pt will improve their accuracy in selecting the " "correct symbols on the SGD device by 20% over the next 3 months. F6- Mod I     Pt received own device this date with large font keyboard, bluetooth keyboard and mouse and stylus and weighted stylus    Pt exhibited interest in therapy ted.  Reduced response to cues from therapist to promote speech    Education/ Strategy Training:  Discussed some maladaptive behaviors and reduced self regulation resulting in maladaptive behaviors.     Thelma encouraged not to engage in identified behaviors and encourage strategies for self regulation in order to build adaptive skills.      Thelma verbalized understanding.     Encouraged POA to make a file for the  (name- pt's preferred ) and make an icon that says, \"I would like a chocolate martini.\"    Verbalized understanding    Education provided: discussing that device is to supplement language as an alternative means.  Discussed with POA that although device may stimulate language to continue to expand, it's purpose is to augment language skills.     will benefit from further education/training     Other: Therapist to consult with [employer] consultant re:  Setting up calendar to discuss orientation questions.   Setting up digits for 10's, 100's value.   Inability to access internet  Option for print larger when making icons,  If pt can say words, (ie yes, no, hello, goodbye) should we replace or remove Pt is progressing with use and would benefit from device.       Long Term Goals   Time Frame    Pt will improve FCM in the following areas:  Augmentative/Alternative Communication  Current: 1  Goal: 3-4    12    EXPANDING VOCABULARY  The pt will use the AAC device to expand their functional vocabulary, adding and using 10 new words or phrases appropriately in daily communication  12    SOCIAL INTERRACTION  The pt will use the SGD device to participate in social interactions, such as greeting peers, asking questions, and making comments, in 80% of observed " opportunities over a 3  month period  12    SIMPLE PHRASE CONSTRUCTION  The pt will use the SGD device to construct and communicate simple phrases in 70% of appropriate contexts  12    INDEPENDENCE  The pt will use the AAC device independently in a variety of settings (home and community)for different purposes  (social, personal) with 90% success over a 3 month period  12    DAILY ROUTINES  Pt will use the SGD device to communicate during daily routines with minimal prompting in 80% of opportunities  12    REPAIRING COMMUNICATION BREAKDOWNS  Pt esequiel use the SGD device to repair communication breakdowns in 70% of instnaces when misunderstandings occur 12   Pt supports will demonstrate appropriate stratiegies to support pts use of the SGD device, such as providing wait time and modeling use, in 90% of observed interactions.  12   PEER TRAINING  Peers will engage in supported interactions using the AAC device showing undersandings and patience in 3 out of 4 observed opportunities.  12

## 2024-09-24 NOTE — PROGRESS NOTES
"Speech-Language Pathology Visit      SLP DAILY NOTE FOR OUTPATIENT THERAPY    Patient: Jennifer Sams   MRN: [id number]  : 1945 79 y.o.  Referring Physician: Ney Aparicio,*  Date of Visit: 2024      Certification Dates: 24 through 10/30/24    Diagnosis:   1. Aphasia    2. Cerebrovascular accident (CVA), unspecified mechanism (CMS/HCC)        Chief Complaints:  Aphasia, severe expressive- AAC device    Precautions:  Precautions Comments: Aphasia       TODAY'S VISIT:    Session Time:  Start Time: 1600  Stop Time: 1700  Time Calculation (min): 60 min   History/Vitals/Pain/Encounter Info - 24 1605          Injury History/Precautions/Daily Required Info    Primary Therapist Magnolia Lundy MS, CCC/SLP     Chief Complaint/Reason for Visit  Aphasia, severe expressive- AAC device     Onset of Illness/Injury or Date of Surgery 24     Referring Physician Dr. Aparicio     Precautions Comments Aphasia     Limitations/Impairments vision difficulty;difficulty hearing;safety/cognitive;other (see comments)     Document Type daily treatment     Patient reported fall since last visit No        Pain Assessment    Currently in pain No/Denies                    Daily Treatment Assessment and Plan - 24 1605          Daily Treatment Assessment and Plan    Progress toward goals Progressing     Daily Outcome Summary Pt exhibited improved exploration this date when clinician and support person sat back and encouraged pt to explore.  After finding target area, pt practiced finding communication target with fading cues.  Able to expand \"towns near me\" narratives about cats, more request for help vacuuming and cleaning the litter. Practiced demonstrateing adding icons and expanding story board.     Plan and Recommendations Continue to visit and add to basic needs, orientation.  consider expanding for groceries, laundry, shopping for clothes                      OBJECTIVE MEASUREMENTS TAKEN " TODAY:    None Taken    Today's Treatment:    COGNITION FLOW SHEET  LANGUAGE  CPT 15140   PN: 8/30/2024  POC: 10/30/2024 9/24/2024     SHORT TERM GOALS PROGRESS TOWARD GOALS CURRENT SESSION   POA will demonstrate Operational Competence- Supervision, pt with min A  Powering the device on/off  Adjusting the volume  Accessing the information on the screen; visual field scanning, direct selection, proprioceptive feedback and forced calibration  Developing proficiency in using the system efficiently   Patient:  Pt demonstrated this date.Powering the device on/off S    Talk  screen. (I)    Pt demonstrating scanning of visual field, direct selection and proprioceptive feedback. (S)     At times pt notes with swiping attempt to move screens rather than selecting pages. Min A Turned on - Mod A  Noted with minimally reduced direct selection and forced calibration Min cues.               INITIATE COMMUNICATION:  Pt will use the AAC device to initiate a conversation 3 out of 5 opportunities across natural and simulated settings, min A   (friends, healthcare provider, stranger, neighbor, member of community)  Added icon to describe pain often experienced on right side of body and how to share intensity of the pain using a pain scale.    Practice Fast Talk- to share needs more time to respond    Practiced 4 pages deep to share had a stroke and has aphasia and uses device to talk      Used 3 pages to select groceries- [employer]- [employer]2 foods will need.    Practice Fast Talk- to share needs more time to respond    Practiced 4 pages deep to share had a stroke and has aphasia and uses device to talk      Used 3 pages to select groceries- [employer]- [employer] foods will need.  Pt is not initiating communication with the device.  Added a folder for her friend César and started conversation starters.     Added a folder in places to eat- for wanting to stay home- eat BBQ    Initiated set up to initiate conversation with X2 friends.  Will add another friend  "for total of 3.  Encouraged to practice that selection to initiate over next few days.    Also will practice dining and eating folders to select what she would like to eat.    Orientation:  Name: Verbal +             AAC : my info Kira CHATTERJEE  Address: Verbal: Verbal: -            AAC: Min A My info  Birthday: Verbal: no            AAC:   Cats: people  Added brief narrative.   Can you please help me:      Vacuum, clean the litter     SYMBOL RECOGNITION  Pt will correctly identify and select symbols or icons on the AAC devcie for:  basic needs  Orientation  feelings/frustrations  novel responses  share information   with 80% accuracy, min A Explored additional medical descriptions     Explored orientation responses.      Added feelings-frustration this date.    Practiced 'other icons\" to explore and recognize vocabulary    Added feelings: mad, annoyed, nervous, happy, satisfied, sad, lonely, tired, frustrated- pt noted with difficulty selecting appropriate icon to de    Added folders for Cats, Marisa and Loan    Visited cards briefly and encouraged pt and friend to explore folders for:  Orientation  Feelings/frustrations    Added folder in interest- I like horror movies- Vincent Strickland movies    Added to pt's story that she went to [facility].     Brainstorm basic needs orientation and feelings.    LANGUAGE SYSTEMS   Pt will use single meaning folders and cards within TouchChat to communicate in single words, single meaning prepared scripts, and syntax phrases, min A.  Single words, prepared scripts.    Paired 2 icons for syntax    Single words, prepared scripts.      Not using syntax at this time, however, practiced   \"I don't like...\"  \"Tomatoes.     Demonstrated taking a photo to label a folder/card, discussed recorded narrative or script     Practicing learning single words and simple scripts with single icons.    Used single meaning words, prepared scripts and 2 icon communication.      IMPROVING ACCURACY  Pt will " "improve their accuracy in selecting the correct symbols on the SGD device by 20% over the next 3 months. F6- Mod I     Pt received own device this date with large font keyboard, bluetooth keyboard and mouse and stylus and weighted stylus    Pt exhibited interest in therapy ted.  Reduced response to cues from therapist to promote speech    Education/ Strategy Training:  Discussed some maladaptive behaviors and reduced self regulation resulting in maladaptive behaviors.     Thelma encouraged not to engage in identified behaviors and encourage strategies for self regulation in order to build adaptive skills.      Thelma verbalized understanding.     Encouraged POA to make a file for the  (name- pt's preferred ) and make an icon that says, \"I would like a chocolate martini.\"    Verbalized understanding    Education provided: discussing that device is to supplement language as an alternative means.  Discussed with POA that although device may stimulate language to continue to expand, it's purpose is to augment language skills.     will benefit from further education/training     Other: Therapist to consult with [employer] consultant re:  Setting up calendar to discuss orientation questions.   Setting up digits for 10's, 100's value.   Inability to access internet  Option for print larger when making icons,  If pt can say words, (ie yes, no, hello, goodbye) should we replace or remove Pt is progressing with use and would benefit from device.       Long Term Goals   Time Frame    Pt will improve FCM in the following areas:  Augmentative/Alternative Communication  Current: 1  Goal: 3-4    12    EXPANDING VOCABULARY  The pt will use the AAC device to expand their functional vocabulary, adding and using 10 new words or phrases appropriately in daily communication  12    SOCIAL INTERRACTION  The pt will use the SGD device to participate in social interactions, such as greeting peers, asking questions, and making " comments, in 80% of observed opportunities over a 3  month period  12    SIMPLE PHRASE CONSTRUCTION  The pt will use the SGD device to construct and communicate simple phrases in 70% of appropriate contexts  12    INDEPENDENCE  The pt will use the AAC device independently in a variety of settings (home and community)for different purposes  (social, personal) with 90% success over a 3 month period  12    DAILY ROUTINES  Pt will use the SGD device to communicate during daily routines with minimal prompting in 80% of opportunities  12    REPAIRING COMMUNICATION BREAKDOWNS  Pt esequiel use the SGD device to repair communication breakdowns in 70% of instnaces when misunderstandings occur 12   Pt supports will demonstrate appropriate stratiegies to support pts use of the SGD device, such as providing wait time and modeling use, in 90% of observed interactions.  12   PEER TRAINING  Peers will engage in supported interactions using the AAC device showing undersandings and patience in 3 out of 4 observed opportunities.  12

## 2024-09-26 ENCOUNTER — HOSPITAL ENCOUNTER (OUTPATIENT)
Dept: SPEECH THERAPY | Age: 79
Setting detail: THERAPIES SERIES
Discharge: HOME | End: 2024-09-26
Attending: INTERNAL MEDICINE
Payer: MEDICARE

## 2024-09-26 DIAGNOSIS — I63.9 CEREBROVASCULAR ACCIDENT (CVA), UNSPECIFIED MECHANISM (CMS/HCC): ICD-10-CM

## 2024-09-26 DIAGNOSIS — R47.01 APHASIA: Primary | ICD-10-CM

## 2024-09-26 PROCEDURE — 92609 USE OF SPEECH DEVICE SERVICE: CPT | Mod: GN,KX

## 2024-09-26 NOTE — PROGRESS NOTES
Speech-Language Pathology Visit      SLP DAILY NOTE FOR OUTPATIENT THERAPY    Patient: Jennifer Sams   MRN: [id number]  : 1945 79 y.o.  Referring Physician: Ney Aparicio,*  Date of Visit: 2024      Certification Dates: 24 through 10/30/24    Diagnosis:   1. Aphasia    2. Cerebrovascular accident (CVA), unspecified mechanism (CMS/HCC)        Chief Complaints:  Aphasia, severe expressive- AAC device    Precautions:  Precautions Comments: Aphasia       TODAY'S VISIT:    Session Time:  Start Time: 1600  Stop Time: 1700  Time Calculation (min): 60 min   History/Vitals/Pain/Encounter Info - 24 1606          Injury History/Precautions/Daily Required Info    Primary Therapist Magnolia Lundy MS, CCC/SLP     Chief Complaint/Reason for Visit  Aphasia, severe expressive- AAC device     Onset of Illness/Injury or Date of Surgery 24     Referring Physician Dr. Aparicio     Precautions Comments Aphasia     Limitations/Impairments vision difficulty;difficulty hearing;safety/cognitive;other (see comments)     Document Type daily treatment     Patient reported fall since last visit No        Pain Assessment    Currently in pain No/Denies                    Daily Treatment Assessment and Plan - 24 1606          Daily Treatment Assessment and Plan    Progress toward goals Progressing     Daily Outcome Summary Pt continues to exhibit interest in device demonstrating increased exploration and more independace navigating pages.     Plan and Recommendations Set up calendar, inquiries to ask others.                      OBJECTIVE MEASUREMENTS TAKEN TODAY:    None Taken    Today's Treatment:    COGNITION FLOW SHEET  LANGUAGE  CPT 23538   PN: 2024  POC: 10/30/2024 2024  Continue to visit and add to basic needs, orientation. consider expanding for groceries, laundry, shopping for clothes    SHORT TERM GOALS PROGRESS TOWARD GOALS CURRENT SESSION   POA will demonstrate Operational  Competence- Supervision, pt with min A  Powering the device on/off  Adjusting the volume  Accessing the information on the screen; visual field scanning, direct selection, proprioceptive feedback and forced calibration  Developing proficiency in using the system efficiently Turned on - Mod A  Noted with minimally reduced direct selection and forced calibration Min cues.New device    Patient:  Pt demonstrated this date.Powering the device on/off S    Talk  screen. (I)    Pt demonstrating scanning of visual field, direct selection and proprioceptive feedback. (S)     At times pt notes with swiping attempt to move screens rather than selecting pages. Min A     New device: Mod A cues to push 'softer' button and hold 3-5 sec    Practiced   'back' button  Deleted  Video icons  Scrolling through screen options  Managing how many screens per icon/folder.              INITIATE COMMUNICATION:  Pt will use the AAC device to initiate a conversation 3 out of 5 opportunities across natural and simulated settings, min A   (friends, healthcare provider, stranger, neighbor, member of community)  Orientation:  Name: Verbal +             AAC : my info Ma A  Address: Verbal: Verbal: -            AAC: Min A My info  Birthday: Verbal: no            AAC:   Cats: people  Added brief narrative.   Can you please help me:      Vacuum, clean the litter    Added icon to describe pain often experienced on right side of body and how to share intensity of the pain using a pain scale.    Practice Fast Talk- to share needs more time to respond    Practiced 4 pages deep to share had a stroke and has aphasia and uses device to talk      Used 3 pages to select groceries- [employer]- [employer] will need.    Practice Fast Talk- to share needs more time to respond    Practiced 4 pages deep to share had a stroke and has aphasia and uses device to talk      Used 3 pages to select groceries- [employer]- [employer]2 foods will need.  Pt is not initiating communication with the  "device.  Added a folder for her friend César and started conversation starters.     Added a folder in places to eat- for wanting to stay home- eat BBQ    Initiated set up to initiate conversation with X2 friends.  Will add another friend for total of 3.  Encouraged to practice that selection to initiate over next few days.    Also will practice dining and eating folders to select what she would like to eat.      Pt added:  Help-\"help with my babies\"-clip loan's nails, get cat food ready, change out the litter   Added [employer] and common grocery lists from different stores.  Discussed ability to group ie. Shrimp and ham in meat Folder.     Explored card library  -actions  -calendar  -weather  Discussed hygiene items,clothing for shopping.      Discussed considering making categories:  Folder: Meat  Cards: shrimp, ham    Discussed using language starters on store pages  -I need  -I want  -Can we get  -   SYMBOL RECOGNITION  Pt will correctly identify and select symbols or icons on the AAC devcie for:  basic needs  Orientation  feelings/frustrations  novel responses  share information   with 80% accuracy, min A Explored additional medical descriptions     Explored orientation responses.      Added feelings-frustration this date.    Practiced 'other icons\" to explore and recognize vocabulary    Added feelings: mad, annoyed, nervous, happy, satisfied, sad, lonely, tired, frustrated- pt noted with difficulty selecting appropriate icon to de    Added folders for Cats, Marisa and Loan    Visited cards briefly and encouraged pt and friend to explore folders for:  Orientation  Feelings/frustrations    Added folder in interest- I like horror movies- Vincent Strickland movies    Added to pt's story that she went to [facility].     Brainstorm basic needs orientation and feelings. Noted with difficulty understanding why icons have 2-word phrases   LANGUAGE SYSTEMS   Pt will use single meaning folders and cards within " "TouchChat to communicate in single words, single meaning prepared scripts, and syntax phrases, min A.  Single words, prepared scripts.    Paired 2 icons for syntax    Single words, prepared scripts.      Not using syntax at this time, however, practiced   \"I don't like...\"  \"Tomatoes.     Demonstrated taking a photo to label a folder/card, discussed recorded narrative or script     Practicing learning single words and simple scripts with single icons.    Used single meaning words, prepared scripts and 2 icon communication.      IMPROVING ACCURACY  Pt will improve their accuracy in selecting the correct symbols on the SGD device by 20% over the next 3 months. F6- Mod I     Pt received own device this date with large font keyboard, bluetooth keyboard and mouse and stylus and weighted stylus    Pt exhibited interest in therapy ted.  Reduced response to cues from therapist to promote speech    Education/ Strategy Training:  Discussed some maladaptive behaviors and reduced self regulation resulting in maladaptive behaviors.     Thelma encouraged not to engage in identified behaviors and encourage strategies for self regulation in order to build adaptive skills.      Thelma verbalized understanding.     Encouraged POA to make a file for the  (name- pt's preferred ) and make an icon that says, \"I would like a chocolate martini.\"    Verbalized understanding    Education provided: discussing that device is to supplement language as an alternative means.  Discussed with POA that although device may stimulate language to continue to expand, it's purpose is to augment language skills.     will benefit from further education/training         Other: Therapist to consult with [employer] consultant re:  Setting up calendar to discuss orientation questions.   Setting up digits for 10's, 100's value.   Inability to access internet  Option for print larger when making icons,  If pt can say words, (ie yes, no, hello, " goodbye) should we replace or remove Pt is progressing with use and would benefit from device.       Long Term Goals   Time Frame    Pt will improve FCM in the following areas:  Augmentative/Alternative Communication  Current: 1  Goal: 3-4    12    EXPANDING VOCABULARY  The pt will use the AAC device to expand their functional vocabulary, adding and using 10 new words or phrases appropriately in daily communication  12    SOCIAL INTERRACTION  The pt will use the SGD device to participate in social interactions, such as greeting peers, asking questions, and making comments, in 80% of observed opportunities over a 3  month period  12    SIMPLE PHRASE CONSTRUCTION  The pt will use the SGD device to construct and communicate simple phrases in 70% of appropriate contexts  12    INDEPENDENCE  The pt will use the AAC device independently in a variety of settings (home and community)for different purposes  (social, personal) with 90% success over a 3 month period  12    DAILY ROUTINES  Pt will use the SGD device to communicate during daily routines with minimal prompting in 80% of opportunities  12    REPAIRING COMMUNICATION BREAKDOWNS  Pt esequiel use the SGD device to repair communication breakdowns in 70% of instnaces when misunderstandings occur 12   Pt supports will demonstrate appropriate stratiegies to support pts use of the SGD device, such as providing wait time and modeling use, in 90% of observed interactions.  12   PEER TRAINING  Peers will engage in supported interactions using the AAC device showing undersandings and patience in 3 out of 4 observed opportunities.  12

## 2024-10-03 DIAGNOSIS — I71.40 ABDOMINAL AORTIC ANEURYSM (AAA) WITHOUT RUPTURE, UNSPECIFIED PART (CMS/HCC): Primary | ICD-10-CM

## 2024-10-11 NOTE — PROGRESS NOTES
"   Cardiology Note     10/15/2024            HPI   Jennifer Sams is a 79 y.o. woman who presents for follow up for dilated cardiomyopathy, today, 10/15/2024.    As you know, she has a history of a mild ischemic cardiomyopathy with a very recent reassuring left and right heart cath, heart block with pacemaker placed 2019 and CAD who has had significant dyspnea on exertion. She was evaluated at the [facility], then here by Dr. Tidwell from a pulmonary standpoint but a pulmonary cause was not found and PFTs are normal. Dr. Carolina previously made pacemaker setting adjustments with regards to AV delay which helped somewhat initially but not completely. She previously followed with Dr. Courtney who retired, and now Dr. Moncada who did a left and right heart cath in February of 2021 found low-normal filling pressures and no residual severe CAD.  Unfortunately, she suffered a large stroke in June 2023.  An echocardiogram at that time showed an LV apical thrombus and she has been on systemic anticoagulation.  She has made good progress but left with some aphasia and right-sided weakness.    Since her last visit in March, she had an echo which showed an LVEF of 45%, apical thrombus again noted. She was in the ER twice- once for diarrhea, then for stroke like symptoms in which a CT of the head was negative. She remains frustrated by continued stroke deficits. Intermittent numbness and tingling, still with aphagia. Lost her taste now as well. She has been feeling dizzy, not walking much outside. Spending a lot of time alone as she cannot find a caretaker.  She also lost her cat a few weeks ago so she is depressed as well, not taking Lexapro as she is \"sick of everything.   Has lost some more weight, intermittant appetite. Undergoing PT for frozen shoulder, this is going well.           Past Medical History:   Diagnosis Date    AAA (abdominal aortic aneurysm) (CMS/Formerly [facility])     Arthritis     Elevated blood pressure reading " without diagnosis of hypertension 04/19/2019    Epistaxis 01/06/2020    GERD (gastroesophageal reflux disease) 04/19/2019    Glaucoma 04/19/2019    Heart murmur     Hiatal hernia     History of hip replacement, total, right 04/19/2019    Right hip 9/ 2016 and revision 2017     History of rheumatic fever as a child 04/19/2019    Hypercholesterolemia 04/19/2019    Ischemic cardiomyopathy 05/09/2019    Kidney cysts     MI (myocardial infarction) (CMS/Formerly [facility])     Osteoarthritis of multiple joints 04/19/2019    Osteoporosis     Pacemaker 12/09/2019    Raynaud's disease 04/19/2019    RSD (reflex sympathetic dystrophy) 04/19/2019    Of left foot    SOB (shortness of breath) 12/10/2019    Status post insertion of drug eluting coronary artery stent 05/09/2019    Stroke (CMS/Formerly [facility])      Past Surgical History   Procedure Laterality Date    Cholecystectomy open      Coronary angioplasty with stent placement  04/29/2019    of LAD    Coronary angioplasty with stent placement  04/30/2019    of RCA    Dilation and curettage of uterus      Eye surgery      RIGHT CATARACT    FFR - initial vessel N/A 2/12/2021    Performed by Noé Moncada MD at [facility] [place] CARDIAC CATH/EP    FFR/iFR - initial vessel N/A 11/15/2019    Performed by Noé Moncada MD at [facility] [place] CARDIAC CATH/EP    Foot surgery Left     Joint replacement Right 09/2016    total hip    Left heart cath with coronary angiography N/A 11/15/2019    Performed by Noé Moncada MD at [facility] [place] CARDIAC CATH/EP    Left heart cath with coronary angiography N/A 4/29/2019    Performed by Noé Moncada MD at [facility] [place] CARDIAC CATH/EP    PPM - dual chamber new N/A 11/18/2019    Performed by Jermaine Carolina MD at [facility] [place] CARDIAC CATH/EP    Revision total hip arthroplasty Right 2017    RIGHT & LEFT HEART CATH WITH CORONARY ANGIOGRAPHY N/A 2/12/2021    Performed by Noé Moncada MD at [facility] [place] CARDIAC CATH/EP    Stent FRANK coronary - initial vessel N/A 5/2/2019    Performed by Noé Moncada MD at [facility] [place] CARDIAC CATH/EP    Stent  "FRANK coronary - initial vessel N/A 2019    Performed by Noé Moncada MD at [facility] [place] CARDIAC CATH/EP    Stent FRANK coronary - initial vessel N/A 2019    Performed by Noé Moncada MD at [facility] [place] CARDIAC CATH/EP    Tonsillectomy         SOCIAL HISTORY  Social History     Tobacco Use    Smoking status: Never    Smokeless tobacco: Never   Vaping Use    Vaping status: Never Used   Substance Use Topics    Alcohol use: Not Currently    Drug use: No     Family Status   Relation Name Status    Bio Mother   at age 66    Bio Father   at age 77        pulmonary fibrosis    MGM      MGF      PGM   at age 90        natural causes    PGF     No partnership data on file        ALLERGIES  Ace inhibitors, Atorvastatin, Brilinta [ticagrelor], Codeine, Dilaudid [hydromorphone], Morphine sulfate, Onion, and Oxycodone      Outpatient Encounter Medications as of 10/15/2024:     apixaban (ELIQUIS) 5 mg tablet, Take 1 tablet (5 mg total) by mouth 2 (two) times a day Indications: treatment to prevent blood clots in chronic atrial fibrillation.    pantoprazole (PROTONIX) 40 mg EC tablet, Take 1 tablet (40 mg total) by mouth daily.    rosuvastatin (CRESTOR) 20 mg tablet, TAKE 1 TABLET BY MOUTH EVERY DAY (Patient taking differently: Take 20 mg by mouth nightly.)    [DISCONTINUED] escitalopram (LEXAPRO) 5 mg tablet, Take 1 tablet (5 mg total) by mouth daily. (Patient not taking: Reported on 10/15/2024)    ROS   As per HPI and otherwise negative.  Otherwise all relevant systems are reviewed and are negative.    Objective   Visit Vitals  /78   Pulse 62   Ht 1.753 m (5' 9\")   Wt 49.4 kg (108 lb 14.4 oz)   SpO2 99%   BMI 16.08 kg/m²         Wt Readings from Last 3 Encounters:   10/15/24 49.4 kg (108 lb 14.4 oz)   24 52.2 kg (115 lb)   24 52.2 kg (115 lb)       Physical Exam  Vitals reviewed.   Constitutional:       Appearance: She is well-developed.   HENT:      Head: Normocephalic. " "  Eyes:      General: No scleral icterus.  Neck:      Vascular: No JVD.   Cardiovascular:      Rate and Rhythm: Normal rate and regular rhythm.      Heart sounds: Normal heart sounds.   Pulmonary:      Breath sounds: Normal breath sounds.   Skin:     General: Skin is warm and dry.   Neurological:      Mental Status: She is alert and oriented to person, place, and time.      Comments: Right sided weakness. Moderate expressive aphasia   Psychiatric:         Judgment: Judgment normal.         Lab Results   Component Value Date    GLUCOSE 117 (H) 08/12/2024    CALCIUM 9.7 08/12/2024     08/12/2024    K 4.1 08/12/2024    CO2 27 08/12/2024     08/12/2024    BUN 16 08/12/2024    CREATININE 0.9 08/12/2024     Lab Results   Component Value Date    ALT 9 08/12/2024    AST 18 08/12/2024    ALKPHOS 57 08/12/2024    BILITOT 0.7 08/12/2024     Lab Results   Component Value Date    WBC 5.57 08/12/2024    HGB 15.2 08/12/2024    HCT 45.6 (H) 08/12/2024    MCV 92.9 08/12/2024     08/12/2024     Lab Results   Component Value Date    TSH 2.94 04/27/2022     Lab Results   Component Value Date    CHOL 178 06/26/2023    CHOL 163 03/31/2023    CHOL 156 02/04/2022     Lab Results   Component Value Date    HDL 71 06/26/2023    HDL 80 03/31/2023    HDL 71 02/04/2022     Lab Results   Component Value Date    LDLCALC 88 06/26/2023    LDLCALC 66 03/31/2023    LDLCALC 66 02/04/2022     Lab Results   Component Value Date    TRIG 93 06/26/2023    TRIG 87 03/31/2023    TRIG 107 02/04/2022     No results found for: \"CHOLHDL\"  Lab Results   Component Value Date    HGBA1C 5.4 06/26/2023     Labs December 27, 2023:              Labs 2/6/24:       EKG    Performed on 10/15/24 and personally reviewed:  AV-paced at 62 bpm       Imaging    Echo 11/29/19:  Technically limited study no gross chamber enlargement  Imaging done just to exclude pericardial effusion Limited echo study  No regional wall motion abnormality preserved ejection " fraction 55 to 60%  Aortic sclerosis  Mitral tricuspid valves appear normal  Prominent anterior fat pad no evidence of pericardial effusion or tamponade    CXR 1/17/2020:  No active disease.    PFTs 12/24/20:        LHC/RHC 2/12/21:  1. Normal right and left heart filling pressures (RA 3, PA 20/5 with mean of 11 mmHg, and PCW 6 mm Hg).  2. Patent prior stents in the mid and distal LAD.  3. 50% ostial Cx stenosis, not functionally significant by iFR (0.95). Patent proximal Cx stent.  4. Patent mid and distal RCA stents.    Echo 2/17/21:  Left Ventricle: Normal ventricle size. Normal wall thickness. Mildly decreased systolic function. Estimated EF 45- 50%. No regional wall motion abnormalities. Grade I diastolic dysfunction. Diastolic inflow pattern consistent with impaired relaxation. Normal left atrial pressure.  Aortic Valve: Tricuspid valve. Sclerotic leaflets.  Sinuses of Valsalva normal-sized. Ascending aorta normal-sized.  Mild mitral annular calcification.  Left Atrium: Normal-sized atrium. Normal doppler flow of pulmonary veins.  Right Ventricle: Normal ventricle size. Low normal systolic function. Pacer wire present. Normal wall thickness.  Right Atrium: Normal-sized atrium. Pacer/ICD lead present.  Tricuspid Valve: Normal structure. Mild regurgitation. Estimated RVSP = 25 mmHg. The regurgitation jet is central. Normal hepatic vein systolic flow.  IVC/SVC: Normal-sized IVC. IVC collapses >50% during inspiration. Normal hepatic vein flow.  Pericardium: Normal structure.     CPT July 2021  CARDIOPULMONARY GAS EXCHANGE:  The test was near-maximal as defined by a respiratory exchange ratio of 1.10(normal >1.10).  The peak VO2 was 14.7 cc/kg/min which is 62% predicted (normal >85%).  The anaerobic threshold was 39% predicted (normal >40%).  The peak minute ventilation was 43L/min yielding a normal breathing reserve of 52% (normal >30%).  The VE/VCO2 slope was 43(normal <34).  Resting spirometry showed an FVC of  98%, FEV1 of 99% consistent with normal spirometry.     CONCLUSIONS:  By respiratory exchange ratio, the test was near maximal thus peak VO2 achieved may slightly underestimate her true exercise capacity.  Peak VO2 achieved was 14.7 cc/kg/min which is only 62% of predicted.  In combination with a low anaerobic threshold there is likely an element of deconditioning.  Her breathing reserve and spirometry suggest a cardiac rather than pulmonary limitation to exercise.  Suggest a regular exercise regimen to potentially increase functional capacity.     TTE 2-9-22  Left Ventricle: Normal ventricle size. Mild concentric left ventricular hypertrophy. Mild-to-moderately decreased systolic function. Estimated EF 40- 45%. LV SVI low at 28 mL/m² per beat. Basal and mid inferolateral akinesis, mid and apical anteroseptal akinesisGrade I diastolic dysfunction. Diastolic inflow pattern consistent with impaired relaxation. Normal left atrial pressure.  Tricuspid aortic valve. Sclerotic aortic valve leaflets.  Aorta: Sinuses of Valsalva normal-sized.  Normal leaflet structure of the mitral valve.  Normal-sized LA. Doppler flow of pulmonary veins not obtained.  Normal-sized RV. Mildly reduced RV systolic function. Pacemaker/ICD lead present in the RV. Normal RV wall thickness. Mid free wall akinetic.  Normal-sized RA. Pacemaker wire present in the RA.  Tricuspid Valve: Normal structure. Trace regurgitation. Estimated RVSP = 27 mmHg.  Pulmonic valve not well visualized.  IVC collapses <50% during inspiration.  Evidence of a prominent pericardial fat pad.    [employer] 3-11-22  1. This is a very technically difficult study.  Patient showed excessive motion during image acquisition, imaged with arms at sides, and diaphragmatic and GI interference.   2. Regadenoson technetium tetrofosmin shows a small, fixed defect in the apical to mid inferior wall, suggestive of tissue attenuation, although scar cannot be excluded. There is no  myocardium at risk.   3. EKG is non-diagnostic due to ventricular pacing.  4. Gated SPECT imaging was unable to be obtained.   5. No prior study for comparison. Prior nuclear imaging was non-diagnostic, exercise testing.     Echo 9/13/2022:    Left Ventricle: Normal ventricle size. Normal wall thickness. Mildly decreased systolic function. Estimated EF 45-50%. Basal and mid inferolateral akinesis, mid and apical anteroseptal/septal akinesis.    Aortic Valve: Valve not well seen but likely trileaflet.  Sclerotic leaflets. No regurgitation. No stenosis.    Aorta: Aortic root normal.    Right Ventricle: Ventricle not well visualized but probably normal size and at most mildly dilated. Catheter present. Normal systolic function.    Tricuspid Valve: Mild regurgitation. Estimated RVSP = 26 mmHg.    Pericardium: Small anterior pericardial effusion    Left Atrium: Normal sized atrium.    Right Atrium: Normal sized atrium.    Mitral Valve: No regurgitation.    Pulmonic Valve: Valve not well visualized. No regurgitation.    IVC/SVC: Normal sized inferior vena cava. Inferior vena cava collapses >50% during inspiration.    Compared with February 2022, no significant change.    I personally reviewed results with her in the office today.     Abdominal US 10/18/2022:  Aortic ectasia noted, measuring about 2.8 cm in maximal diameter    Echo 3/28/2023:    Left Ventricle: Normal ventricle size. Normal wall thickness. Mildly decreased systolic function. Estimated EF 45-50%. Basal and mid inferolateral akinesis, mid and apical anteroseptal/septal akinesis.    Aortic Valve: Valve not well seen but likely trileaflet.  Sclerotic leaflets. No regurgitation. No stenosis.    Aorta: Aortic root normal.    Right Ventricle: Ventricle not well visualized but probably normal size and at most mildly dilated. Catheter present. Normal systolic function.    Tricuspid Valve: Mild regurgitation. Estimated RVSP = 26 mmHg.    Pericardium: Small anterior  pericardial effusion    Left Atrium: Normal sized atrium.    Right Atrium: Normal sized atrium.    Mitral Valve: No regurgitation.    Pulmonic Valve: Valve not well visualized. No regurgitation.    IVC/SVC: Normal sized inferior vena cava. Inferior vena cava collapses >50% during inspiration.    Compared with February 2022, no significant change.    I personally reviewed results with her in the office today.    Echo 6/27/2023  Left ventricle with normal dimensions and regional contraction abnormalities consistent with CAD in the LAD distribution: Localized apical infarction with aneurysm formation.  Paradoxical septal movement is consistent with ventricular pacing.  There is moderate left ventricular systolic and mild diastolic dysfunction.  LVEF 43%  Left ventricular apical thrombus  Left atrial pressure 14 mmHg  There is a visual suggestion of mild aortic stenosis. (Doppler assessment of the aortic valve is suboptimal)  Mild mitral regurgitation  Normal right ventricle dimensions and systolic function: TAPSE 1.84 cm  Mild tricuspid regurgitation  PASP 34 mmHg  Normal left atrial volume index  No pericardial effusion or vegetations  AS-V pacing  Technically difficult study: No parasternal imaging obtained  This study was performed with Definity contrast to optimize evaluation of regional and global left ventricular function  No significant change from 3/28/2023 except for current demonstration of left ventricular apical thrombus    TTE 3/2024    Left Ventricle: Normal ventricle size. Normal wall thickness. Probable small apical thrombus. Mildly decreased systolic function. Estimated EF 45%. Basal and mid inferolateral akinesis, mid and apical anteroseptal/septal akinesis.  Abnormal septal motion. Normal diastolic filling pattern for age.    Aortic Valve: Valve not well seen but likely trileaflet.  Sclerotic leaflets. No regurgitation. Mild stenosis. Peak velocity = 1.56 m/s. Mean gradient = 6.00 mmHg. Calculated  "area by cont eq = 1.23 cm2. Calculated dimensionless index = 0.32.    Aorta: Aortic root normal.    Right Ventricle: Ventricle not well visualized but probably normal size and at most mildly dilated. Pacer wire present. Normal systolic function.    Tricuspid Valve: Mild regurgitation. Estimated RVSP = 24 mmHg.    Pericardium: Evidence of a prominent fat pad. Small anterior pericardial effusion    Left Atrium: Normal sized atrium.    Right Atrium: Normal sized atrium.    Mitral Valve: Mild regurgitation.    Pulmonic Valve: Valve not well visualized. No regurgitation.    IVC/SVC: Normal sized inferior vena cava. Inferior vena cava collapses >50% during inspiration.    Compared with June 2023, no significant change.      ASSESSMENT AND PLAN    1. Chronic systolic and diastolic CHF, ACC Stage C, NYHA class 3  No extra volume on exam and a right heart cath showed low filling pressures, so diuretics are not indicated.  She is concerned about potential cost of Entresto and given the dizziness described I will avoid anything with a diuretic effect for now thus the decision to go with valsartan or irbesartan to start.  I prescribed a few times in the past  but she has not started it. She is \"sick\" of all of her meds and her condition.    2. CAD.  non-STEMI 4/26/19, three-vessel CAD; status post FRANK x 2 LAD 4/27/19 + FRANK x 4 RCA 4/29/19 + FRANK x 1 LCX OM2 5/2/19  She should continue aspirin but she is not. She will continue statin therapy.  Dr. Moncada attempted to keep her on dual antiplatelet therapy given her numerous stents but ultimately could not be tolerated with incessant nosebleeds.  Now on Eliquis since her stroke.  She remains concerned about recurrent nosebleeds that thankfully has not had it.    3. Dyslipidemia  She should continue with rosuvastatin therapy.   Last LDL 88.  I have picked my battles in the past of asking her to start Lexapro and Irbesartan which were previously prescribed but she didn't start.    4. " Discoloration of toes, concern for peripheral vascular disease  She describes what could be Raynaud's of her feet but does not get it in her hands.  She does have reduced pulses in her left foot.  Symptoms are unchanged. Will continue to monitor.  We previously discussed a vascular assessment but she declined.    5. LV apical thrombus  Found when CVA occurred June 2023. Should stay on lifelong anticoagulation, continue Eliquis,.     6.  Abdominal aortic aneurysm.  2.9 cm on imaging in 2018.  A follow-up ultrasound 2022 showed no significant growth.  No need to continue surveillance as she is even slightly under the minimal cutoff for an aneurysm.     7.  Mobitz 2 AV block status post pacemaker 2019.  She follows with Dr. Carolina and is seeing him later today.    8. CVA  Found to have LV apical thrombus, so likely embolic. Should stay on Eliquis.    9.  Depression and anxiety.  She has been prescribed escitalopram for this by her PCP, but Jennifer and her friend reports she did not want to start it.  We had a long discussion last visit about the benefits of it.  She is worried it will be sedating and I reassured her it is not that type of anxiety medicine.  Long discussion about benefits and we discussed that approximately 50% of stroke victims end up suffering from depression.  I have strongly recommended she start it as recommended by her PCP.  Ultimately she decided not to.          By signing my name below, I, Jennifer Kay, attest that this documentation has been prepared under the direction and in the presence of Jermaine Salter MD    Sincerely,    Jennifer Kay CRNP  10/15/2024     Patient seen and examined.  I agree with the findings and recommendations of the scribed note above with my modifications. I reviewed interim records from her primary physician,  neurologist, and ER notes.  I reviewed her labs and last echocardiogram. Please do not hesitate to contact me with any questions or  concerns.    Sincerely,    Jermaine Salter MD  10/15/2024

## 2024-10-15 ENCOUNTER — TELEPHONE (OUTPATIENT)
Dept: CARDIOLOGY | Facility: CLINIC | Age: 79
End: 2024-10-15

## 2024-10-15 ENCOUNTER — OFFICE VISIT (OUTPATIENT)
Dept: CARDIOLOGY | Facility: CLINIC | Age: 79
End: 2024-10-15
Payer: MEDICARE

## 2024-10-15 VITALS
DIASTOLIC BLOOD PRESSURE: 72 MMHG | BODY MASS INDEX: 16 KG/M2 | WEIGHT: 108 LBS | SYSTOLIC BLOOD PRESSURE: 122 MMHG | HEIGHT: 69 IN

## 2024-10-15 VITALS
WEIGHT: 108.9 LBS | HEART RATE: 62 BPM | DIASTOLIC BLOOD PRESSURE: 78 MMHG | SYSTOLIC BLOOD PRESSURE: 120 MMHG | OXYGEN SATURATION: 99 % | BODY MASS INDEX: 16.13 KG/M2 | HEIGHT: 69 IN

## 2024-10-15 DIAGNOSIS — I51.3 LV (LEFT VENTRICULAR) MURAL THROMBUS: ICD-10-CM

## 2024-10-15 DIAGNOSIS — Z86.73 HISTORY OF CARDIOEMBOLIC STROKE: ICD-10-CM

## 2024-10-15 DIAGNOSIS — I50.42 CHRONIC COMBINED SYSTOLIC AND DIASTOLIC CHF (CONGESTIVE HEART FAILURE) (CMS/HCC): Primary | ICD-10-CM

## 2024-10-15 DIAGNOSIS — I63.9 CEREBROVASCULAR ACCIDENT (CVA), UNSPECIFIED MECHANISM (CMS/HCC): ICD-10-CM

## 2024-10-15 DIAGNOSIS — I44.1 MOBITZ TYPE 2 SECOND DEGREE ATRIOVENTRICULAR BLOCK: ICD-10-CM

## 2024-10-15 DIAGNOSIS — Z95.0 CARDIAC PACEMAKER: Primary | ICD-10-CM

## 2024-10-15 DIAGNOSIS — E78.5 DYSLIPIDEMIA: ICD-10-CM

## 2024-10-15 DIAGNOSIS — I25.5 ISCHEMIC CARDIOMYOPATHY: ICD-10-CM

## 2024-10-15 DIAGNOSIS — I25.10 CORONARY ARTERY DISEASE INVOLVING NATIVE CORONARY ARTERY OF NATIVE HEART WITHOUT ANGINA PECTORIS: ICD-10-CM

## 2024-10-15 PROBLEM — R06.02 SOB (SHORTNESS OF BREATH): Status: RESOLVED | Noted: 2019-12-10 | Resolved: 2024-10-15

## 2024-10-15 LAB
ATRIAL RATE: 62
P AXIS: 72
PR INTERVAL: 198
QRS DURATION: 180
QT INTERVAL: 484
QTC CALCULATION(BAZETT): 491
R AXIS: -64
T WAVE AXIS: 91
VENTRICULAR RATE: 62

## 2024-10-15 PROCEDURE — G8752 SYS BP LESS 140: HCPCS | Performed by: INTERNAL MEDICINE

## 2024-10-15 PROCEDURE — 99214 OFFICE O/P EST MOD 30 MIN: CPT | Performed by: INTERNAL MEDICINE

## 2024-10-15 PROCEDURE — G8754 DIAS BP LESS 90: HCPCS | Performed by: INTERNAL MEDICINE

## 2024-10-15 PROCEDURE — 93000 ELECTROCARDIOGRAM COMPLETE: CPT | Performed by: INTERNAL MEDICINE

## 2024-10-15 ASSESSMENT — ENCOUNTER SYMPTOMS
RESPIRATORY NEGATIVE: 1
SPEECH DIFFICULTY: 1
PSYCHIATRIC NEGATIVE: 1
CARDIOVASCULAR NEGATIVE: 1
EYES NEGATIVE: 1
HEMATOLOGIC/LYMPHATIC NEGATIVE: 1
CONSTITUTIONAL NEGATIVE: 1
WEAKNESS: 1
ALLERGIC/IMMUNOLOGIC NEGATIVE: 1
ENDOCRINE NEGATIVE: 1
GASTROINTESTINAL NEGATIVE: 1

## 2024-10-15 NOTE — ASSESSMENT & PLAN NOTE
[employer] [place] XT DR MRI [id number] implanted on 11/18/2019.  Battery longevity estimated at 7.7 years until RRT.  Mode is DDD .  The atrial lead measures a P wave of 2.4 mV, lead impedance of 646 ohms, and a pacing threshold 0.5 V 0.4 ms.  The RV lead measures and R wave 9.6 mV, lead impedance of 456 ohms, and a pacing threshold of 0.75 V 0.4 ms.  No events on interrogation.    Her device is MRI compatible.  If necessary, she can undergo an MRI.  There were no changes made to the permanent programming of her device.

## 2024-10-15 NOTE — ASSESSMENT & PLAN NOTE
Her most recent echo in March 2024 showed an EF of 45% with probably small apical thrombus. She follows with Dr. Salter and was seen earlier today.

## 2024-10-15 NOTE — LETTER
October 15, 2024     Sabrina Caruso MD  [address] [facility] [place] [address]    Patient: Jennifer Sams  YOB: 1945  Date of Visit: 10/15/2024      Dear Dr. Caruso:    Thank you for referring Jennifer Sams to me for evaluation. Below are my notes for this consultation.    If you have questions, please do not hesitate to call me. I look forward to following your patient along with you.         Sincerely,        Jermaine Salter MD        CC: MD Eliza Allen Kathleen J, CRNP  10/15/2024  3:05 PM  Sign when Signing Visit     Cardiology Note     10/15/2024            HPI   Jennifer Sams is a 79 y.o. woman who presents for follow up for dilated cardiomyopathy, today, 10/15/2024.    As you know, she has a history of a mild ischemic cardiomyopathy with a very recent reassuring left and right heart cath, heart block with pacemaker placed 2019 and CAD who has had significant dyspnea on exertion. She was evaluated at the [facility], then here by Dr. Tidwell from a pulmonary standpoint but a pulmonary cause was not found and PFTs are normal. Dr. Carolina previously made pacemaker setting adjustments with regards to AV delay which helped somewhat initially but not completely. She previously followed with Dr. Courtney who retired, and now Dr. Moncada who did a left and right heart cath in February of 2021 found low-normal filling pressures and no residual severe CAD.  Unfortunately, she suffered a large stroke in June 2023.  An echocardiogram at that time showed an LV apical thrombus and she has been on systemic anticoagulation.  She has made good progress but left with some aphasia and right-sided weakness.    Since her last visit in March, she had an echo which showed an LVEF of 45%, apical thrombus again noted. She was in the ER twice- once for diarrhea, then for stroke like symptoms in which a CT of the head was negative. She remains frustrated by continued stroke deficits.  "Intermittent numbness and tingling, still with aphagia. Lost her taste now as well. She has been feeling dizzy, not walking much outside. Spending a lot of time alone as she cannot find a caretaker.  She also lost her cat a few weeks ago so she is depressed as well, not taking Lexapro as she is \"sick of everything.   Has lost some more weight, intermittant appetite. Undergoing PT for frozen shoulder, this is going well.           Past Medical History:   Diagnosis Date   • AAA (abdominal aortic aneurysm) (CMS/[facility])    • Arthritis    • Elevated blood pressure reading without diagnosis of hypertension 04/19/2019   • Epistaxis 01/06/2020   • GERD (gastroesophageal reflux disease) 04/19/2019   • Glaucoma 04/19/2019   • Heart murmur    • Hiatal hernia    • History of hip replacement, total, right 04/19/2019    Right hip 9/ 2016 and revision 2017    • History of rheumatic fever as a child 04/19/2019   • Hypercholesterolemia 04/19/2019   • Ischemic cardiomyopathy 05/09/2019   • Kidney cysts    • MI (myocardial infarction) (CMS/[facility])    • Osteoarthritis of multiple joints 04/19/2019   • Osteoporosis    • Pacemaker 12/09/2019   • Raynaud's disease 04/19/2019   • RSD (reflex sympathetic dystrophy) 04/19/2019    Of left foot   • SOB (shortness of breath) 12/10/2019   • Status post insertion of drug eluting coronary artery stent 05/09/2019   • Stroke (CMS/[facility])      Past Surgical History   Procedure Laterality Date   • Cholecystectomy open     • Coronary angioplasty with stent placement  04/29/2019    of LAD   • Coronary angioplasty with stent placement  04/30/2019    of RCA   • Dilation and curettage of uterus     • Eye surgery      RIGHT CATARACT   • FFR - initial vessel N/A 2/12/2021    Performed by No[place] [facility] [place]   • FFR/iFR - initial vessel N/A 11/15/2019    Performed by Noé [place]cada MD at [facility] [place]   • Foot surgery Left    • Joint replacement Right 09/2016    total hip   • Left heart cath " with coronary angiography N/A 11/15/2019    Performed by Noé Moncada MD at [facility] [place] [facility]ARDIAC CATH/EP   • Left heart cath with coronary angiography N/A 2019    Performed by Noé Moncada MD at [facility] [place] CARDIAC CATH/EP   • PPM - dual chamber new N/A 2019    Performed by Jermaine Carolina MD at [facility] [place] CARDIAC CATH/EP   • Revision total hip arthroplasty Right    • RIGHT & LEFT HEART CATH WITH CORONARY ANGIOGRAPHY N/A 2021    Performed by Noé Moncada MD at [facility] [place] CARDIAC CATH/EP   • Stent FRANK coronary - initial vessel N/A 2019    Performed by Noé Moncada MD at [facility] [place] CARDIAC CATH/EP   • Stent FRANK coronary - initial vessel N/A 2019    Performed by Noé Moncada MD at [facility] [place] CARDIAC CATH/EP   • Stent FRANK coronary - initial vessel N/A 2019    Performed by Noé Moncada MD at [facility] [place] CARDIAC CATH/EP   • Tonsillectomy         SOCIAL HISTORY  Social History     Tobacco Use   • Smoking status: Never   • Smokeless tobacco: Never   Vaping Use   • Vaping status: Never Used   Substance Use Topics   • Alcohol use: Not Currently   • Drug use: No     Family Status   Relation Name Status   • Bio Mother   at age 66   • Bio Father   at age 77        pulmonary fibrosis   • MGM     • MGF     • PGM   at age 90        natural causes   • PGF     No partnership data on file        ALLERGIES  Ace inhibitors, Atorvastatin, Brilinta [ticagrelor], Codeine, Dilaudid [hydromorphone], Morphine sulfate, Onion, and Oxycodone      Outpatient Encounter Medications as of 10/15/2024:   •  apixaban (ELIQUIS) 5 mg tablet, Take 1 tablet (5 mg total) by mouth 2 (two) times a day Indications: treatment to prevent blood clots in chronic atrial fibrillation.  •  pantoprazole (PROTONIX) 40 mg EC tablet, Take 1 tablet (40 mg total) by mouth daily.  •  rosuvastatin (CRESTOR) 20 mg tablet, TAKE 1 TABLET BY MOUTH EVERY DAY (Patient taking differently: Take 20 mg by mouth nightly.)  •  [DISCONTINUED]  "escitalopram (LEXAPRO) 5 mg tablet, Take 1 tablet (5 mg total) by mouth daily. (Patient not taking: Reported on 10/15/2024)    ROS   As per HPI and otherwise negative.  Otherwise all relevant systems are reviewed and are negative.    Objective   Visit Vitals  /78   Pulse 62   Ht 1.753 m (5' 9\")   Wt 49.4 kg (108 lb 14.4 oz)   SpO2 99%   BMI 16.08 kg/m²         Wt Readings from Last 3 Encounters:   10/15/24 49.4 kg (108 lb 14.4 oz)   08/12/24 52.2 kg (115 lb)   07/12/24 52.2 kg (115 lb)       Physical Exam  Vitals reviewed.   Constitutional:       Appearance: She is well-developed.   HENT:      Head: Normocephalic.   Eyes:      General: No scleral icterus.  Neck:      Vascular: No JVD.   Cardiovascular:      Rate and Rhythm: Normal rate and regular rhythm.      Heart sounds: Normal heart sounds.   Pulmonary:      Breath sounds: Normal breath sounds.   Skin:     General: Skin is warm and dry.   Neurological:      Mental Status: She is alert and oriented to person, place, and time.      Comments: Right sided weakness. Moderate expressive aphasia   Psychiatric:         Judgment: Judgment normal.         Lab Results   Component Value Date    GLUCOSE 117 (H) 08/12/2024    CALCIUM 9.7 08/12/2024     08/12/2024    K 4.1 08/12/2024    CO2 27 08/12/2024     08/12/2024    BUN 16 08/12/2024    CREATININE 0.9 08/12/2024     Lab Results   Component Value Date    ALT 9 08/12/2024    AST 18 08/12/2024    ALKPHOS 57 08/12/2024    BILITOT 0.7 08/12/2024     Lab Results   Component Value Date    WBC 5.57 08/12/2024    HGB 15.2 08/12/2024    HCT 45.6 (H) 08/12/2024    MCV 92.9 08/12/2024     08/12/2024     Lab Results   Component Value Date    TSH 2.94 04/27/2022     Lab Results   Component Value Date    CHOL 178 06/26/2023    CHOL 163 03/31/2023    CHOL 156 02/04/2022     Lab Results   Component Value Date    HDL 71 06/26/2023    HDL 80 03/31/2023    HDL 71 02/04/2022     Lab Results   Component Value Date    " "LDLCALC 88 06/26/2023    LDLCALC 66 03/31/2023    LDLCALC 66 02/04/2022     Lab Results   Component Value Date    TRIG 93 06/26/2023    TRIG 87 03/31/2023    TRIG 107 02/04/2022     No results found for: \"CHOLHDL\"  Lab Results   Component Value Date    HGBA1C 5.4 06/26/2023     Labs December 27, 2023:              Labs 2/6/24:       EKG    Performed on 10/15/24 and personally reviewed:  AV-paced at 62 bpm       Imaging    Echo 11/29/19:  Technically limited study no gross chamber enlargement  Imaging done just to exclude pericardial effusion Limited echo study  No regional wall motion abnormality preserved ejection fraction 55 to 60%  Aortic sclerosis  Mitral tricuspid valves appear normal  Prominent anterior fat pad no evidence of pericardial effusion or tamponade    CXR 1/17/2020:  No active disease.    PFTs 12/24/20:        LHC/RHC 2/12/21:  1. Normal right and left heart filling pressures (RA 3, PA 20/5 with mean of 11 mmHg, and PCW 6 mm Hg).  2. Patent prior stents in the mid and distal LAD.  3. 50% ostial Cx stenosis, not functionally significant by iFR (0.95). Patent proximal Cx stent.  4. Patent mid and distal RCA stents.    Echo 2/17/21:  Left Ventricle: Normal ventricle size. Normal wall thickness. Mildly decreased systolic function. Estimated EF 45- 50%. No regional wall motion abnormalities. Grade I diastolic dysfunction. Diastolic inflow pattern consistent with impaired relaxation. Normal left atrial pressure.  Aortic Valve: Tricuspid valve. Sclerotic leaflets.  Sinuses of Valsalva normal-sized. Ascending aorta normal-sized.  Mild mitral annular calcification.  Left Atrium: Normal-sized atrium. Normal doppler flow of pulmonary veins.  Right Ventricle: Normal ventricle size. Low normal systolic function. Pacer wire present. Normal wall thickness.  Right Atrium: Normal-sized atrium. Pacer/ICD lead present.  Tricuspid Valve: Normal structure. Mild regurgitation. Estimated RVSP = 25 mmHg. The regurgitation " jet is central. Normal hepatic vein systolic flow.  IVC/SVC: Normal-sized IVC. IVC collapses >50% during inspiration. Normal hepatic vein flow.  Pericardium: Normal structure.     CPT July 2021  CARDIOPULMONARY GAS EXCHANGE:  The test was near-maximal as defined by a respiratory exchange ratio of 1.10(normal >1.10).  The peak VO2 was 14.7 cc/kg/min which is 62% predicted (normal >85%).  The anaerobic threshold was 39% predicted (normal >40%).  The peak minute ventilation was 43L/min yielding a normal breathing reserve of 52% (normal >30%).  The VE/VCO2 slope was 43(normal <34).  Resting spirometry showed an FVC of 98%, FEV1 of 99% consistent with normal spirometry.     CONCLUSIONS:  By respiratory exchange ratio, the test was near maximal thus peak VO2 achieved may slightly underestimate her true exercise capacity.  Peak VO2 achieved was 14.7 cc/kg/min which is only 62% of predicted.  In combination with a low anaerobic threshold there is likely an element of deconditioning.  Her breathing reserve and spirometry suggest a cardiac rather than pulmonary limitation to exercise.  Suggest a regular exercise regimen to potentially increase functional capacity.     TTE 2-9-22  Left Ventricle: Normal ventricle size. Mild concentric left ventricular hypertrophy. Mild-to-moderately decreased systolic function. Estimated EF 40- 45%. LV SVI low at 28 mL/m² per beat. Basal and mid inferolateral akinesis, mid and apical anteroseptal akinesisGrade I diastolic dysfunction. Diastolic inflow pattern consistent with impaired relaxation. Normal left atrial pressure.  Tricuspid aortic valve. Sclerotic aortic valve leaflets.  Aorta: Sinuses of Valsalva normal-sized.  Normal leaflet structure of the mitral valve.  Normal-sized LA. Doppler flow of pulmonary veins not obtained.  Normal-sized RV. Mildly reduced RV systolic function. Pacemaker/ICD lead present in the RV. Normal RV wall thickness. Mid free wall akinetic.  Normal-sized RA.  Pacemaker wire present in the RA.  Tricuspid Valve: Normal structure. Trace regurgitation. Estimated RVSP = 27 mmHg.  Pulmonic valve not well visualized.  IVC collapses <50% during inspiration.  Evidence of a prominent pericardial fat pad.    TrenStaran 3-11-22  1. This is a very technically difficult study.  Patient showed excessive motion during image acquisition, imaged with arms at sides, and diaphragmatic and GI interference.   2. Regadenoson technetium tetrofosmin shows a small, fixed defect in the apical to mid inferior wall, suggestive of tissue attenuation, although scar cannot be excluded. There is no myocardium at risk.   3. EKG is non-diagnostic due to ventricular pacing.  4. Gated SPECT imaging was unable to be obtained.   5. No prior study for comparison. Prior nuclear imaging was non-diagnostic, exercise testing.     Echo 9/13/2022:  •  Left Ventricle: Normal ventricle size. Normal wall thickness. Mildly decreased systolic function. Estimated EF 45-50%. Basal and mid inferolateral akinesis, mid and apical anteroseptal/septal akinesis.  •  Aortic Valve: Valve not well seen but likely trileaflet.  Sclerotic leaflets. No regurgitation. No stenosis.  •  Aorta: Aortic root normal.  •  Right Ventricle: Ventricle not well visualized but probably normal size and at most mildly dilated. Catheter present. Normal systolic function.  •  Tricuspid Valve: Mild regurgitation. Estimated RVSP = 26 mmHg.  •  Pericardium: Small anterior pericardial effusion  •  Left Atrium: Normal sized atrium.  •  Right Atrium: Normal sized atrium.  •  Mitral Valve: No regurgitation.  •  Pulmonic Valve: Valve not well visualized. No regurgitation.  •  IVC/SVC: Normal sized inferior vena cava. Inferior vena cava collapses >50% during inspiration.  •  Compared with February 2022, no significant change.  •  I personally reviewed results with her in the office today.     Abdominal US 10/18/2022:  Aortic ectasia noted, measuring about 2.8 cm  in maximal diameter    Echo 3/28/2023:  •  Left Ventricle: Normal ventricle size. Normal wall thickness. Mildly decreased systolic function. Estimated EF 45-50%. Basal and mid inferolateral akinesis, mid and apical anteroseptal/septal akinesis.  •  Aortic Valve: Valve not well seen but likely trileaflet.  Sclerotic leaflets. No regurgitation. No stenosis.  •  Aorta: Aortic root normal.  •  Right Ventricle: Ventricle not well visualized but probably normal size and at most mildly dilated. Catheter present. Normal systolic function.  •  Tricuspid Valve: Mild regurgitation. Estimated RVSP = 26 mmHg.  •  Pericardium: Small anterior pericardial effusion  •  Left Atrium: Normal sized atrium.  •  Right Atrium: Normal sized atrium.  •  Mitral Valve: No regurgitation.  •  Pulmonic Valve: Valve not well visualized. No regurgitation.  •  IVC/SVC: Normal sized inferior vena cava. Inferior vena cava collapses >50% during inspiration.  •  Compared with February 2022, no significant change.  •  I personally reviewed results with her in the office today.    Echo 6/27/2023  Left ventricle with normal dimensions and regional contraction abnormalities consistent with CAD in the LAD distribution: Localized apical infarction with aneurysm formation.  Paradoxical septal movement is consistent with ventricular pacing.  There is moderate left ventricular systolic and mild diastolic dysfunction.  LVEF 43%  Left ventricular apical thrombus  Left atrial pressure 14 mmHg  There is a visual suggestion of mild aortic stenosis. (Doppler assessment of the aortic valve is suboptimal)  Mild mitral regurgitation  Normal right ventricle dimensions and systolic function: TAPSE 1.84 cm  Mild tricuspid regurgitation  PASP 34 mmHg  Normal left atrial volume index  No pericardial effusion or vegetations  AS-V pacing  Technically difficult study: No parasternal imaging obtained  This study was performed with Definity contrast to optimize evaluation of  "regional and global left ventricular function  No significant change from 3/28/2023 except for current demonstration of left ventricular apical thrombus    TTE 3/2024  •  Left Ventricle: Normal ventricle size. Normal wall thickness. Probable small apical thrombus. Mildly decreased systolic function. Estimated EF 45%. Basal and mid inferolateral akinesis, mid and apical anteroseptal/septal akinesis.  Abnormal septal motion. Normal diastolic filling pattern for age.  •  Aortic Valve: Valve not well seen but likely trileaflet.  Sclerotic leaflets. No regurgitation. Mild stenosis. Peak velocity = 1.56 m/s. Mean gradient = 6.00 mmHg. Calculated area by cont eq = 1.23 cm2. Calculated dimensionless index = 0.32.  •  Aorta: Aortic root normal.  •  Right Ventricle: Ventricle not well visualized but probably normal size and at most mildly dilated. Pacer wire present. Normal systolic function.  •  Tricuspid Valve: Mild regurgitation. Estimated RVSP = 24 mmHg.  •  Pericardium: Evidence of a prominent fat pad. Small anterior pericardial effusion  •  Left Atrium: Normal sized atrium.  •  Right Atrium: Normal sized atrium.  •  Mitral Valve: Mild regurgitation.  •  Pulmonic Valve: Valve not well visualized. No regurgitation.  •  IVC/SVC: Normal sized inferior vena cava. Inferior vena cava collapses >50% during inspiration.  •  Compared with June 2023, no significant change.      ASSESSMENT AND PLAN    1. Chronic systolic and diastolic CHF, ACC Stage C, NYHA class 3  No extra volume on exam and a right heart cath showed low filling pressures, so diuretics are not indicated.  She is concerned about potential cost of Entresto and given the dizziness described I will avoid anything with a diuretic effect for now thus the decision to go with valsartan or irbesartan to start.  I prescribed a few times in the past  but she has not started it. She is \"sick\" of all of her meds and her condition.    2. CAD.  non-STEMI 4/26/19, three-vessel " CAD; status post FRANK x 2 LAD 4/27/19 + FRANK x 4 RCA 4/29/19 + FRANK x 1 LCX OM2 5/2/19  She should continue aspirin but she is not. She will continue statin therapy.  Dr. Moncada attempted to keep her on dual antiplatelet therapy given her numerous stents but ultimately could not be tolerated with incessant nosebleeds.  Now on Eliquis since her stroke.  She remains concerned about recurrent nosebleeds that thankfully has not had it.    3. Dyslipidemia  She should continue with rosuvastatin therapy.   Last LDL 88.  I have picked my battles in the past of asking her to start Lexapro and Irbesartan which were previously prescribed but she didn't start.    4. Discoloration of toes, concern for peripheral vascular disease  She describes what could be Raynaud's of her feet but does not get it in her hands.  She does have reduced pulses in her left foot.  Symptoms are unchanged. Will continue to monitor.  We previously discussed a vascular assessment but she declined.    5. LV apical thrombus  Found when CVA occurred June 2023. Should stay on lifelong anticoagulation, continue Eliquis,.     6.  Abdominal aortic aneurysm.  2.9 cm on imaging in 2018.  A follow-up ultrasound 2022 showed no significant growth.  No need to continue surveillance as she is even slightly under the minimal cutoff for an aneurysm.     7.  Mobitz 2 AV block status post pacemaker 2019.  She follows with Dr. Carolina and is seeing him later today.    8. CVA  Found to have LV apical thrombus, so likely embolic. Should stay on Eliquis.    9.  Depression and anxiety.  She has been prescribed escitalopram for this by her PCP, but Jennifer and her friend reports she did not want to start it.  We had a long discussion last visit about the benefits of it.  She is worried it will be sedating and I reassured her it is not that type of anxiety medicine.  Long discussion about benefits and we discussed that approximately 50% of stroke victims end up suffering from  depression.  I have strongly recommended she start it as recommended by her PCP.  Ultimately she decided not to.          By signing my name below, I, Jennifer Kay, attest that this documentation has been prepared under the direction and in the presence of Jermaine Salter MD    Sincerely,    Jennifer Kay CRNP  10/15/2024     Patient seen and examined.  I agree with the findings and recommendations of the scribed note above with my modifications. I reviewed interim records from her primary physician,  neurologist, and ER notes.  I reviewed her labs and last echocardiogram. Please do not hesitate to contact me with any questions or concerns.    Sincerely,    Jermaine Salter MD  10/15/2024

## 2024-10-15 NOTE — ASSESSMENT & PLAN NOTE
She presented to [facility] in June 2023 with aphasia and right-sided weakness.  CT revealed acute infarct in left MCA.  Echo showed EF of 43% localized apical aneurysm containing thrombus.  She was started on Eliquis 5 mg twice daily.  Pacemaker showed no evidence of atrial fibrillation.  There were no events on interrogation.  She remains frustrated with her right-sided weakness and aphasia.

## 2024-10-15 NOTE — TELEPHONE ENCOUNTER
Patient is scheduled to see Dr. Carolina on 4/22/2025 and would like to see Dr. Salter on the same day.  Please advise.

## 2024-10-15 NOTE — PROGRESS NOTES
Electrophysiology       Reason for visit: Follow-up   HPI   Jennifer Sams is a 79 y.o. female who comes to the office today for follow-up of her device and related arrhythmia issues. She is status post dual-chamber pacemaker secondary to 2-1 AV block.  Her past medical history is significant for CAD requiring 8 stents including stents to her LAD, RCA, and circumflex and mild ischemic cardiomyopathy. She had an echocardiogram that was unchanged from June 2023 echo with probable small apical thrombus.    She presented to [facility] on June 26,2023 after she was found confused and combative.  In the emergency department she was aphasic with right-sided weakness and hypertensive to the 190 systolic.  CT scan of the head revealed an acute infarct in left MCA with mild bleeding.  Echo showed an EF of 43% and localized apical aneurysm containing thrombus. A bubble study was negative. Etiology of her stroke was thought to be due to the localized apical aneurysm containing thrombus and she was started on anticoagulation with heparin and was later transitioned to Eliquis. Her telemetry during the admission shows atrial sensing with ventricular pacing. No atrial fibrillation was seen on monitor or her pacemaker.    She continues to be very frustrated with her aphasia and right sided weakness. Sge denies cardiovascular symptoms.       Past Medical History:   Diagnosis Date    AAA (abdominal aortic aneurysm) (CMS/Formerly [facility])     Arthritis     Elevated blood pressure reading without diagnosis of hypertension 04/19/2019    Epistaxis 01/06/2020    GERD (gastroesophageal reflux disease) 04/19/2019    Glaucoma 04/19/2019    Heart murmur     Hiatal hernia     History of hip replacement, total, right 04/19/2019    Right hip 9/ 2016 and revision 2017     History of rheumatic fever as a child 04/19/2019    Hypercholesterolemia 04/19/2019    Ischemic cardiomyopathy 05/09/2019    Kidney cysts     MI (myocardial infarction) (CMS/Formerly [facility])      Osteoarthritis of multiple joints 04/19/2019    Osteoporosis     Pacemaker 12/09/2019    Raynaud's disease 04/19/2019    RSD (reflex sympathetic dystrophy) 04/19/2019    Of left foot    SOB (shortness of breath) 12/10/2019    Status post insertion of drug eluting coronary artery stent 05/09/2019    Stroke (CMS/[facility])      Past Surgical History   Procedure Laterality Date    Cholecystectomy open      Coronary angioplasty with stent placement  04/29/2019    of LAD    Coronary angioplasty with stent placement  04/30/2019    of RCA    Dilation and curettage of uterus      Eye surgery      RIGHT CATARACT    FFR - initial vessel N/A 2/12/2021    Performed by Noé [facility]cada [facility] at [facility] CARDIAC CATH/EP    FFR/iFR - initial vessel N/A 11/15/2019    Performed by [facility]ada MD at [facility] CARDIAC CATH/EP    Foot surgery Left     Joint replacement Right 09/2016    total hip    Left heart cath with coronary angiography N/A 11/15/2019    Performed by Noé [facility]cada MD at [facility] CARDIAC CATH/EP    Left heart cath with coronary angiography N/A 4/29/2019    Performed by Noé [facility]cada MD at [facility] CARDIAC CATH/EP    PPM - dual chamber new N/A 11/18/2019    Performed by Jermaine Carolina MD at [facility] CARDIAC CATH/EP    Revision total hip arthroplasty Right 2017    RIGHT & LEFT HEART CATH WITH CORONARY ANGIOGRAPHY N/A 2/12/2021    Performed by [facility]é [facility]cada MD at [facility] CARDIAC CATH/EP    Stent FRANK coronary - initial vessel N/A 5/2/2019    Performed by Noé [facility]cada MD at [facility] CARDIAC CATH/EP    Stent FRANK coronary - initial vessel N/A 4/30/2019    Performed by Noé [facility]cada MD at [facility] CARDIAC CATH/EP    Stent FRANK coronary - initial vessel N/A 4/29/2019    Performed by Noé [facility]cada MD at [facility] CARDIAC CATH/EP    Tonsillectomy       Allergies:  Ace inhibitors, Atorvastatin, Brilinta [ticagrelor], Codeine, Dilaudid [hydromorphone], Morphine sulfate, Onion, and Oxycodone    Current Outpatient Medications on File Prior to Visit   Medication Sig Dispense  Refill    rosuvastatin (CRESTOR) 20 mg tablet TAKE 1 TABLET BY MOUTH EVERY DAY 90 tablet 3     No current facility-administered medications on file prior to visit.     Social History     Socioeconomic History    Marital status: Single     Spouse name: None    Number of children: None    Years of education: None    Highest education level: None   Tobacco Use    Smoking status: Never    Smokeless tobacco: Never   Vaping Use    Vaping status: Never Used   Substance and Sexual Activity    Alcohol use: Not Currently    Drug use: No    Sexual activity: Defer     Social Drivers of Health     Financial Resource Strain: Low Risk  (7/3/2023)    Overall Financial Resource Strain (CARDIA)     Difficulty of Paying Living Expenses: Not hard at all   Food Insecurity: No Food Insecurity (8/12/2024)    Hunger Vital Sign     Worried About Running Out of Food in the Last Year: Never true     Ran Out of Food in the Last Year: Never true   Transportation Needs: No Transportation Needs (7/3/2023)    PRAPARE - Transportation     Lack of Transportation (Medical): No     Lack of Transportation (Non-Medical): No   Housing Stability: Low Risk  (7/3/2023)    Housing Stability Vital Sign     Unable to Pay for Housing in the Last Year: No     Number of Places Lived in the Last Year: 1     Unstable Housing in the Last Year: No     Family History   Problem Relation Name Age of Onset    Congenital heart disease Biological Mother          noted at autopsy    Pulmonary fibrosis Biological Father      Heart attack Paternal Grandfather       Review of Systems   Constitutional: Negative.    HENT: Negative.     Eyes: Negative.    Respiratory: Negative.     Cardiovascular: Negative.    Gastrointestinal: Negative.    Endocrine: Negative.    Genitourinary: Negative.    Skin: Negative.    Allergic/Immunologic: Negative.    Neurological:  Positive for speech difficulty and weakness.   Hematological: Negative.    Psychiatric/Behavioral: Negative.       Vitals:     10/15/24 1522   BP: 122/72       Wt Readings from Last 3 Encounters:   10/15/24 49 kg (108 lb)   10/15/24 49.4 kg (108 lb 14.4 oz)   08/12/24 52.2 kg (115 lb)     Physical Exam  Constitutional:       Appearance: Normal appearance. She is normal weight.   HENT:      Head: Normocephalic and atraumatic.   Cardiovascular:      Rate and Rhythm: Normal rate and regular rhythm.      Pulses: Normal pulses.      Heart sounds: Normal heart sounds.   Pulmonary:      Effort: Pulmonary effort is normal.      Breath sounds: Normal breath sounds.   Skin:     General: Skin is warm and dry.   Neurological:      Mental Status: She is alert and oriented to person, place, and time.      Motor: Weakness present.      Comments: She has right-sided extremity weakness, and dysarthria/aphasia.        Lab Results   Component Value Date    WBC 5.57 08/12/2024    HGB 15.2 08/12/2024    HCT 45.6 (H) 08/12/2024     08/12/2024    CHOL 178 06/26/2023    TRIG 93 06/26/2023    HDL 71 06/26/2023    LDLCALC 88 06/26/2023    ALT 9 08/12/2024    AST 18 08/12/2024     08/12/2024    K 4.1 08/12/2024    CREATININE 0.9 08/12/2024    BUN 16 08/12/2024    TSH 2.94 04/27/2022    INR 1.1 06/28/2023       Cardiac Imaging    TRANSTHORACIC ECHO (TTE) COMPLETE 02/09/2022  · Left Ventricle: Normal ventricle size. Mild concentric left ventricular hypertrophy. Mild-to-moderately decreased systolic function. Estimated EF 40- 45%. LV SVI low at 28 mL/m² per beat. Basal and mid inferolateral akinesis, mid and apical anteroseptal akinesisGrade I diastolic dysfunction. Diastolic inflow pattern consistent with impaired relaxation. Normal left atrial pressure.  · Tricuspid aortic valve. Sclerotic aortic valve leaflets.  · Aorta: Sinuses of Valsalva normal-sized.  · Normal leaflet structure of the mitral valve.  · Normal-sized LA. Doppler flow of pulmonary veins not obtained.  · Normal-sized RV. Mildly reduced RV systolic function. Pacemaker/ICD lead present  in the RV. Normal RV wall thickness. Mid free wall akinetic.  · Normal-sized RA. Pacemaker wire present in the RA.  · Tricuspid Valve: Normal structure. Trace regurgitation. Estimated RVSP = 27 mmHg.  · Pulmonic valve not well visualized.  · IVC collapses <50% during inspiration.  · Evidence of a prominent pericardial fat pad.  Limited study.  Tissue velocities not obtained.  Apical 2 chamber view not available.  Patient declined Definity.    Echo 6/27/2023  Left ventricle with normal dimensions and regional contraction abnormalities consistent with CAD in the LAD distribution: Localized apical infarction with aneurysm formation.  Paradoxical septal movement is consistent with ventricular pacing.  There is moderate left ventricular systolic and mild diastolic dysfunction.  LVEF 43%  Left ventricular apical thrombus  Left atrial pressure 14 mmHg  There is a visual suggestion of mild aortic stenosis. (Doppler assessment of the aortic valve is suboptimal)  Mild mitral regurgitation  Normal right ventricle dimensions and systolic function: TAPSE 1.84 cm  Mild tricuspid regurgitation  PASP 34 mmHg  Normal left atrial volume index  No pericardial effusion or vegetations  AS-V pacing  Technically difficult study: No parasternal imaging obtained  This study was performed with Definity contrast to optimize evaluation of regional and global left ventricular function  No significant change from 3/28/2023 except for current demonstration of left ventricular apical thrombus      Echocardiogram 3/15/2024    Left Ventricle: Normal ventricle size. Normal wall thickness. Probable small apical thrombus. Mildly decreased systolic function. Estimated EF 45%. Basal and mid inferolateral akinesis, mid and apical anteroseptal/septal akinesis.  Abnormal septal motion. Normal diastolic filling pattern for age.    Aortic Valve: Valve not well seen but likely trileaflet.  Sclerotic leaflets. No regurgitation. Mild stenosis. Peak velocity =  1.56 m/s. Mean gradient = 6.00 mmHg. Calculated area by cont eq = 1.23 cm2. Calculated dimensionless index = 0.32.    Aorta: Aortic root normal.    Right Ventricle: Ventricle not well visualized but probably normal size and at most mildly dilated. Pacer wire present. Normal systolic function.    Tricuspid Valve: Mild regurgitation. Estimated RVSP = 24 mmHg.    Pericardium: Evidence of a prominent fat pad. Small anterior pericardial effusion    Left Atrium: Normal sized atrium.    Right Atrium: Normal sized atrium.    Mitral Valve: Mild regurgitation.    Pulmonic Valve: Valve not well visualized. No regurgitation.    IVC/SVC: Normal sized inferior vena cava. Inferior vena cava collapses >50% during inspiration.    Compared with June 2023, no significant change.      ECG   Sinus w V pacing      Ischemic cardiomyopathy  Her most recent echo in March 2024 showed an EF of 45% with probably small apical thrombus. She follows with Dr. Salter and was seen earlier today.     Cardiac pacemaker  Medtronic Tuttle XT DR MRI [id number] implanted on 11/18/2019.  Battery longevity estimated at 7.7 years until RRT.  Mode is DDD .  The atrial lead measures a P wave of 2.4 mV, lead impedance of 646 ohms, and a pacing threshold 0.5 V 0.4 ms.  The RV lead measures and R wave 9.6 mV, lead impedance of 456 ohms, and a pacing threshold of 0.75 V 0.4 ms.  No events on interrogation.    Her device is MRI compatible.  If necessary, she can undergo an MRI.  There were no changes made to the permanent programming of her device.    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    History of cardioembolic stroke  She presented to [facility] in June 2023 with aphasia and right-sided weakness.  CT revealed acute infarct in left MCA.  Echo showed EF of 43% localized apical aneurysm containing thrombus.  She was started on Eliquis 5 mg twice daily.  Pacemaker showed no evidence of atrial  fibrillation.  There were no events on interrogation.  She remains frustrated with her right-sided weakness and aphasia.           Jermaine Carolina MD  10/15/2024

## 2024-10-16 NOTE — TELEPHONE ENCOUNTER
Patient appointment moved to 04/22/25 @ 3:30 with Dr Salter, left message to advise patient, a=of her requested change

## 2024-10-25 RX ORDER — PANTOPRAZOLE SODIUM 40 MG/1
40 TABLET, DELAYED RELEASE ORAL DAILY
Qty: 90 TABLET | Refills: 3 | Status: SHIPPED | OUTPATIENT
Start: 2024-10-25

## 2024-10-25 NOTE — TELEPHONE ENCOUNTER
Refill request received from     Last appt w/ Dr Salter 10/15/24    Upcoming appt on 4/22/25    Refill  pending     SD/HARDIK:  Please advise if ok to refill or defer to PCP.

## 2024-10-25 NOTE — TELEPHONE ENCOUNTER
Yes if she is taking it, but I don't believe she is. Can you check with her friend that is her contact? The patient has aphasia from a CVA.

## 2024-10-29 ENCOUNTER — HOSPITAL ENCOUNTER (OUTPATIENT)
Dept: SPEECH THERAPY | Age: 79
Setting detail: THERAPIES SERIES
Discharge: HOME | End: 2024-10-29
Attending: INTERNAL MEDICINE
Payer: MEDICARE

## 2024-10-29 DIAGNOSIS — R47.01 APHASIA: Primary | ICD-10-CM

## 2024-10-29 DIAGNOSIS — I63.9 CEREBROVASCULAR ACCIDENT (CVA), UNSPECIFIED MECHANISM (CMS/HCC): ICD-10-CM

## 2024-10-29 PROCEDURE — 92606 NON-SPEECH DEVICE SERVICE: CPT | Mod: GN

## 2024-10-29 PROCEDURE — 92609 USE OF SPEECH DEVICE SERVICE: CPT | Mod: GN

## 2024-10-29 NOTE — OP SLP TREATMENT LOG
COGNITION FLOW SHEET  LANGUAGE  [facility] [id number]   PN: 8/30/2024  POC: 10/30/2024 10/29/2024     SHORT TERM GOALS PROGRESS TOWARD GOALS CURRENT SESSION   POA will demonstrate Operational Competence- Supervision, pt with min A  Powering the device on/off  Adjusting the volume  Accessing the information on the screen; visual field scanning, direct selection, proprioceptive feedback and forced calibration  Developing proficiency in using the system efficiently Turned on - Mod A  Noted with minimally reduced direct selection and forced calibration Min cues.New device    Patient:  Pt demonstrated this date.Powering the device on/off S    Talk  screen. (I)    Pt demonstrating scanning of visual field, direct selection and proprioceptive feedback. (S)     At times pt notes with swiping attempt to move screens rather than selecting pages. Min A     New device: Mod A cues to push 'softer' button and hold 3-5 sec    Practiced   'back' button  Deleted  Video icons  Scrolling through screen options  Managing how many screens per icon/folder.              INITIATE COMMUNICATION:  Pt will use the AAC device to initiate a conversation 3 out of 5 opportunities across natural and simulated settings, min A   (friends, healthcare provider, stranger, neighbor, member of community)  Orientation:  Name: Verbal +             AAC : my info Ma A  Address: Verbal: Verbal: -            AAC: Min A My info  Birthday: Verbal: no            AAC:   Cats: people  Added brief narrative.   Can you please help me:      Vacuum, clean the litter    Added icon to describe pain often experienced on right side of body and how to share intensity of the pain using a pain scale.    Practice Fast Talk- to share needs more time to respond    Practiced 4 pages deep to share had a stroke and has aphasia and uses device to talk      Used 3 pages to select groceries- [employer] will need.    Practice Fast Talk- to share needs more time to respond    Practiced 4  "pages deep to share had a stroke and has aphasia and uses device to talk      Used 3 pages to select groceries- [employer]- [employer] will need.  Pt is not initiating communication with the device.  Added a folder for her friend César and started conversation starters.     Added a folder in places to eat- for wanting to stay home- eat BBQ    Initiated set up to initiate conversation with X2 friends.  Will add another friend for total of 3.  Encouraged to practice that selection to initiate over next few days.    Also will practice dining and eating folders to select what she would like to eat.      Pt added:  Help-\"help with my babies\"-clip loan's nails, get cat food ready, change out the litter   Added [employer] and common grocery lists from different stores.  Discussed ability to group ie. Shrimp and ham in meat Folder.     Explored card library  -actions  -calendar  -weather  Discussed hygiene items,clothing for shopping.      Discussed considering making categories:  Folder: Meat  Cards: shrimp, ham    Discussed using language starters on store pages  -I need  -I want  -Can we get  -   SYMBOL RECOGNITION  Pt will correctly identify and select symbols or icons on the AAC devcie for:  basic needs  Orientation  feelings/frustrations  novel responses  share information   with 80% accuracy, min A Explored additional medical descriptions     Explored orientation responses.      Added feelings-frustration this date.    Practiced 'other icons\" to explore and recognize vocabulary    Added feelings: mad, annoyed, nervous, happy, satisfied, sad, lonely, tired, frustrated- pt noted with difficulty selecting appropriate icon to de    Added folders for Cats, Marisa and Lona    Visited cards briefly and encouraged pt and friend to explore folders for:  Orientation  Feelings/frustrations    Added folder in interest- I like horror movies- Vincent Strickland movies    Added to pt's story that she went to [facility]. " "    Brainstorm basic needs orientation and feelings. Noted with difficulty understanding why icons have 2-word phrases   LANGUAGE SYSTEMS   Pt will use single meaning folders and cards within TouchChat to communicate in single words, single meaning prepared scripts, and syntax phrases, min A.  Single words, prepared scripts.    Paired 2 icons for syntax    Single words, prepared scripts.      Not using syntax at this time, however, practiced   \"I don't like...\"  \"Tomatoes.     Demonstrated taking a photo to label a folder/card, discussed recorded narrative or script     Practicing learning single words and simple scripts with single icons.    Used single meaning words, prepared scripts and 2 icon communication.      IMPROVING ACCURACY  Pt will improve their accuracy in selecting the correct symbols on the SGD device by 20% over the next 3 months. F6- Mod I     Pt received own device this date with large font keyboard, bluetooth keyboard and mouse and stylus and weighted stylus    Pt exhibited interest in therapy ted.  Reduced response to cues from therapist to promote speech    Education/ Strategy Training:  Discussed some maladaptive behaviors and reduced self regulation resulting in maladaptive behaviors.     Thelma encouraged not to engage in identified behaviors and encourage strategies for self regulation in order to build adaptive skills.      Thelma verbalized understanding.     Encouraged POA to make a file for the  (name- pt's preferred ) and make an icon that says, \"I would like a chocolate martini.\"    Verbalized understanding    Education provided: discussing that device is to supplement language as an alternative means.  Discussed with POA that although device may stimulate language to continue to expand, it's purpose is to augment language skills.     will benefit from further education/training  Discharge discussed with pt and POA.  Discussion of appropriateness for return to therapy.  "     Pt and POA expressed understanding.        Other: Therapist to consult with [employer] consultant re:  Setting up calendar to discuss orientation questions.   Setting up digits for 10's, 100's value.   Inability to access internet  Option for print larger when making icons,  If pt can say words, (ie yes, no, hello, goodbye) should we replace or remove Pt is progressing with use and would benefit from device.

## 2024-10-29 NOTE — LETTER
[address]  [address]  [facility] [address]  [facility] Fax: [phone]    SPEECH THERAPY DISCHARGE    Patient Name: Jennifer Sams    Provider: Magnolia Lundy CCC-SLP  Referring Provider: Ney Aparicio,*  PCP: Sabrina Caruso MD  Payor: Payor: MEDICARE / Plan: MEDICARE PART A & B / Product Type: Medicare /   Medical Diagnosis: Aphasia [R47.01]     Dear Dr. Aparicio,    Thank you for the referral of your patient Jennifer Sams for speech therapy. I am writing to you today to make you aware that your patient is being discharged from speech therapy. Please review the latest progress note below and the goals that have been addressed during this plan of care.     If you have any questions or concerns, please feel free to contact me. Once again, thank you for your referral and the opportunity to work with your patient.      Sincerely,  Magnolia Lundy CCC-SLP      Speech-Language Pathology Discharge      SLP DISCHARGE NOTE FOR OUTPATIENT THERAPY    Patient: Jennifer Sams MRN: [id number]  : 1945 79 y.o.  Referring Physician: Ney Aparicio,*  Date of Visit: 10/29/2024      Certification Dates: 24 through 10/30/24    Total Visit Count: 10     Diagnosis:   1. Aphasia    2. Cerebrovascular accident (CVA), unspecified mechanism (CMS/[facility])        Chief Complaints:   Chief Complaint   Patient presents with   • Speech Problem     Aphasia limits expressive and receptive communication.        Precautions:   Precautions Comments: Aphasia    TODAY'S VISIT:    Session Time:  Start Time: 1615  Stop Time: 1700  Time Calculation (min): 45 min   General Information - 10/29/24 1606          Session Details    Document Type discharge evaluation     Mode of Treatment individual therapy        General Information    Onset of Illness/Injury or Date of Surgery 24     Referring Physician Dr. Aparicio     History of present illness/functional impairment   is a 79 y.o. female who presented to [facility] on June 26 after she was not seen by her friends for a few days.  Ms. Sams lives alone and when she was found by EMS she was confused and combative.  In the emergency department she was aphasic with right-sided weakness and hypertensive to the 190 systolic.  CT scan of the head revealed an acute infarct in left MCA with mild bleeding.  Echo showed an EF of 43% and a bubble study was negative.  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 Pt [facility] to  [facility] on 7/1/2023 Principal problem  Acute ischemic left MCA stroke (CMS/HCC). PMH AAA, heart block s/p cardiac pacemaker, glaucoma, hyperlipidemia, coronary artery disease,  Transferred to outpatient and evaluated at [facility] on 8/18/2024. Following spech and language therapy over the last 10 months Ms. Sams returned to speech therapy for a trial of an AAC device to aid in communication and further support language and speech remediation.     Precautions Comments Aphasia     Limitations/Impairments vision difficulty;difficulty hearing;safety/cognitive;other (see comments)     Interventions Language and speech impairment- learning AAC device     Patient/Family/Caregiver Comments/Observations Pt POA discussed personal obstacles contributing in decreased ability to attend speech therapy for AAC device consistently.  Both are in agreement with discharge at this time.                    Daily Falls Screen - 10/29/24 1645          Daily Falls Assessment    Patient reported fall since last visit No                    Pain and Vitals - 10/29/24 1644          Pain Assessment    Currently in pain No/Denies                    SLP - 10/29/24 1609          Speech Therapy    Speech Therapy Specialty Traditional Neuro Program ST   AAC- Aphasia       SLP Frequency and Duration    Frequency of treatment 2 times/week     Speech Duration 3 months     SLP Cert From 07/31/24     SLP Cert To  10/30/24     Date SLP POC was sent to provider 07/31/24     Signed SLP Plan of Care received?  Yes                    Assessment - 10/30/24 1044          Assessment    Plan of Care reviewed and patient/family in agreement Yes     Comments Pt and POA are in agreement with discharge at this time.     Demonstrates Need for Referral to Another Service psychology services;therapeutic recreation services;clinical nutrition services/dietitian;assistive technology     Clinical Assessment Ms. Sams presents to [facility], SLP for discharge session from language therapy targeting augmentative and alternative communication- speech generated device. Ms. Sams's moderate motor speech deficits and Moderate Expressive and Receptive Aphasia with limitations in reading and writing limiting Ms. Sams's communication success, made Ms. Sams a candidate for a [employer] TouchTalk device.  Secondary to family illness, and hardships pt was limited to attend 10 sessions.  Within those 10 sessions Ms. Sams has demonstrated the ability to work basic functions of the device, turning on/off, managing volume, selecting icons for various functions (ie, talking, internet, speech practice, drawing board, etc), calibrating finger tap and swipe for icon selection and page scan.  Ms. Sams, provided assistance from friends continues to build vocabulary and page sequences to promote communication for basic wants, needs, ADLs, express feelings, orientation, share basic information. Ms. Sams will continue to benefit from support from friends to explore opportunities across community settings and setting up successful interactions while utilizing the SGD-AAC.  Ms. Sams will also benefit from reinforcement understanding the primary goal of the device is to convey messages with communication partners, rather than primarily improve neurocommunicative speech and language through own articulators.     Plan and Recommendations Discharge is recommended at  this time. Additional support with new prescription may be considered with improve patient interest and flexibility to alternative communication resources.                     OBJECTIVE MEASUREMENTS/DATA:    OM: FCM/Voice/Neuro    Outcome Measures - 10/29/24 1626          Functional Communication Measures    FCM: Augmentative/Alternative Communication 3-->Level 3                     Outcome Measures          5/14/2024    21:32 7/9/2024    15:08 7/31/2024    20:38 9/4/2024    09:14 10/29/2024    16:26   SLP Outcome Measures   FCM: Augmentative/Alternative Communication   1-->Level 1 2-->Level 2 3-->Level 3   FCM: Motor Speech 4-->Level 4       approaching 5 4-->Level 4 4-->Level 4     FCM: Reading 5-->Level 5 5-->Level 5 5-->Level 5     FCM: Spoken Language, Comprehension 5-->Level 5 5-->Level 5 5-->Level 5     FCM: Spoken Language, Expression 4-->Level 4 4-->Level 4 4-->Level 4         Today's Treatment:    Education provided:  Yes: Methods: Discussion, Handout, Demonstration, and Video  Readiness: acceptance  Response: Needs reinforcement      COGNITION FLOW SHEET  LANGUAGE  CPT 21753   PN: 8/30/2024  POC: 10/30/2024 10/29/2024     SHORT TERM GOALS PROGRESS TOWARD GOALS CURRENT SESSION   POA will demonstrate Operational Competence- Supervision, pt with min A  Powering the device on/off  Adjusting the volume  Accessing the information on the screen; visual field scanning, direct selection, proprioceptive feedback and forced calibration  Developing proficiency in using the system efficiently Turned on - Mod A  Noted with minimally reduced direct selection and forced calibration Min cues.New device    Patient:  Pt demonstrated this date.Powering the device on/off S    Talk  screen. (I)    Pt demonstrating scanning of visual field, direct selection and proprioceptive feedback. (S)     At times pt notes with swiping attempt to move screens rather than selecting pages. Min A     New device: Mod A cues to push 'softer'  "button and hold 3-5 sec    Practiced   'back' button  Deleted  Video icons  Scrolling through screen options  Managing how many screens per icon/folder.              INITIATE COMMUNICATION:  Pt will use the AAC device to initiate a conversation 3 out of 5 opportunities across natural and simulated settings, min A   (friends, healthcare provider, stranger, neighbor, member of community)  Orientation:  Name: Verbal +             AAC : my info Ma A  Address: Verbal: Verbal: -            AAC: Min A My info  Birthday: Verbal: no            AAC:   Cats: people  Added brief narrative.   Can you please help me:      Vacuum, clean the litter    Added icon to describe pain often experienced on right side of body and how to share intensity of the pain using a pain scale.    Practice Fast Talk- to share needs more time to respond    Practiced 4 pages deep to share had a stroke and has aphasia and uses device to talk      Used 3 pages to select groceries- [employer]- [employer] foods will need.    Practice Fast Talk- to share needs more time to respond    Practiced 4 pages deep to share had a stroke and has aphasia and uses device to talk      Used 3 pages to select groceries- [employer]- [employer] will need.  Pt is not initiating communication with the device.  Added a folder for her friend César and started conversation starters.     Added a folder in places to eat- for wanting to stay home- eat BBQ    Initiated set up to initiate conversation with X2 friends.  Will add another friend for total of 3.  Encouraged to practice that selection to initiate over next few days.    Also will practice dining and eating folders to select what she would like to eat.      Pt added:  Help-\"help with my babies\"-clip daniel's nails, get cat food ready, change out the litter   Added [employer] and common grocery lists from different stores.  Discussed ability to group ie. Shrimp and ham in meat Folder.     Explored card " "library  -actions  -calendar  -weather  Discussed hygiene items,clothing for shopping.      Discussed considering making categories:  Folder: Meat  Cards: shrimp, ham    Discussed using language starters on store pages  -I need  -I want  -Can we get  -   SYMBOL RECOGNITION  Pt will correctly identify and select symbols or icons on the AAC devcie for:  basic needs  Orientation  feelings/frustrations  novel responses  share information   with 80% accuracy, min A Explored additional medical descriptions     Explored orientation responses.      Added feelings-frustration this date.    Practiced 'other icons\" to explore and recognize vocabulary    Added feelings: mad, annoyed, nervous, happy, satisfied, sad, lonely, tired, frustrated- pt noted with difficulty selecting appropriate icon to de    Added folders for Cats, Marisa and Loan    Visited cards briefly and encouraged pt and friend to explore folders for:  Orientation  Feelings/frustrations    Added folder in interest- I like horror movies- Vincent Strickland movies    Added to pt's story that she went to [facility].     Brainstorm basic needs orientation and feelings. Noted with difficulty understanding why icons have 2-word phrases   LANGUAGE SYSTEMS   Pt will use single meaning folders and cards within TouchChat to communicate in single words, single meaning prepared scripts, and syntax phrases, min A.  Single words, prepared scripts.    Paired 2 icons for syntax    Single words, prepared scripts.      Not using syntax at this time, however, practiced   \"I don't like...\"  \"Tomatoes.     Demonstrated taking a photo to label a folder/card, discussed recorded narrative or script     Practicing learning single words and simple scripts with single icons.    Used single meaning words, prepared scripts and 2 icon communication.      IMPROVING ACCURACY  Pt will improve their accuracy in selecting the correct symbols on the SGD device by 20% over the next 3 months. F6- Mod " "I     Pt received own device this date with large font keyboard, bluetooth keyboard and mouse and stylus and weighted stylus    Pt exhibited interest in therapy ted.  Reduced response to cues from therapist to promote speech    Education/ Strategy Training:  Discussed some maladaptive behaviors and reduced self regulation resulting in maladaptive behaviors.     Thelma encouraged not to engage in identified behaviors and encourage strategies for self regulation in order to build adaptive skills.      Thelma verbalized understanding.     Encouraged POA to make a file for the  (name- pt's preferred ) and make an icon that says, \"I would like a chocolate martini.\"    Verbalized understanding    Education provided: discussing that device is to supplement language as an alternative means.  Discussed with POA that although device may stimulate language to continue to expand, it's purpose is to augment language skills.     will benefit from further education/training  Discharge discussed with pt and POA.  Discussion of appropriateness for return to therapy.      Pt and POA expressed understanding.        Other: Therapist to consult with [employer] consultant re:  Setting up calendar to discuss orientation questions.   Setting up digits for 10's, 100's value.   Inability to access internet  Option for print larger when making icons,  If pt can say words, (ie yes, no, hello, goodbye) should we replace or remove Pt is progressing with use and would benefit from device.         Goals:     Goals Addressed                   This Visit's Progress    • COMPLETED: Language- AAC Goal          Long Term Goals   Time Frame  Result  Comment/Progress    Pt will improve FCM in the following areas:  Augmentative/Alternative Communication  Current: 1  Goal: 3-4   12  DC:  10/29/2024  Met     3   EXPANDING VOCABULARY  The pt will use the AAC device to expand their functional vocabulary, adding and using 10 new words or phrases " appropriately in daily communication  12   DC:  10/29/2024  Not Met-       Pt continues to explore and add topics/categories for daily use:  Folders:  Friends/pets:   Calendar  Pain  Grocery shopping  Feelings  Hospital story  Personal information  Restaurants    Encouraged to add vocab as experiences come up- halloween, some practice phrases to say to others.   Added a new favorite restaurant this date      SOCIAL INTERRACTION  The pt will use the SGDdevice to participate in social interactions, such as greeting peers, asking questions, and making comments, in 80% of observed opportunities over a 3  month period  12   DC:  10/29/2024  Not Met-    When are we going to dinner again.  How are   Halloween comments.   Communication is typically in response with support   SIMPLE PHRASE CONSTRUCTION  The pt will use the SGD device to construct and communicate simple phrases in 70% of appropriate contexts  12   DC:  10/29/2024  Not Met-   Initiated:    I have:   I feel:   I need:  I would like:     INDEPENDENCE  The pt will use the AAC device independently in a variety of settings (home and community)for different purposes  (social, personal) with 90% success over a 3 month period  12   PN: 9/3/2024  Met  Mostly home and with friends. Exploring medical, restaurants.    DAILY ROUTINES  Pt will use the SGD device to communicate during daily routines with minimal prompting in 80% of opportunities  12   DC:  10/29/2024  Not Met- Moderate prompting    improving with support from POA.  Practicing set up to use out in the community.    REPAIRING COMMUNICATION BREAKDOWNS  Pt esequiel use the SGD device to repair communication breakdowns in 70% of instnaces when misunderstandings occur 12 DC:  10/29/2024  Not Met-    Pt supports will demonstrate appropriate stratiegies to support pts use of the SGD device, such as providing wait time and modeling use, in 90% of observed interactions.  12 DC:  10/29/2024   Met-  MET    Pt familiar with  icon to share need for extra time and to explain why she uses the device   PEER TRAINING  Peers will engage in supported interactions using the AAC device showing undersandings and patience in 3 out of 4 observed opportunities.  12 DC:  10/29/2024   Met- X2 close friends attend regularly to assist in implementation. Pt will work with device to less familiar person, friend's wife.         Short Term Goals Time Frame Result Comment/Progress   POA will demonstrate Operational Competence- Supervision, pt with min A  Powering the device on/off  Adjusting the volume  Accessing the information on the screen; visual field scanning, direct selection, proprioceptive feedback and forced calibration  Developing proficiency in using the system efficiently 4 w DC:  10/29/2024  Met  Pt: Min A to supervision        INITIATE COMMUNICATION:  Pt will use the AAC device to initiate a conversation 3 out of 5 opportunities across natural and simulated settings, min A   (friends, healthcare provider, stranger, neighbor, member of community)  8 w DC:  10/29/2024  Not Met Hesitant to use outside of small group of people.   Encouraged to use with healthcare providers.   Simulated at Mod to max A   SYMBOL RECOGNITION  Pt will correctly identify and select symbols or icons on the AAC devcie for:  basic needs  Orientation  feelings/frustrations  novel responses  share information   with 80% accuracy, min A 8 w DC:  10/29/2024  Partially Met basic needs  Orientation  feelings/frustrations Mod I    Increased difficulty finding icons more than 2 pages deep    novel responses  share information - mod A   LANGUAGE SYSTEMS   Pt will use single meaning folders and cards within TouchChat to communicate in single words, single meaning prepared scripts, and syntax phrases, min A.  12 w DC:  10/29/2024  Partially Met Min A  Max A to use sytax- phrases  Provided ideas I have, I need, see if they have, I want.    IMPROVING ACCURACY  Pt will improve their  accuracy in selecting the correct symbols on the SGD device by 20% over the next 3 months.  8 w DC:  10/29/2024 Min to mod A    Continues to need practice.                  FCM-AAC  Level: 3  The individual usually requires minimal cueing and additional time to use augmentative alternative communication to convey simple messages related to personal wants/needs with familiar communication partners, although accuracy may vary.  The communication partner must assume responsibility for structuring most communication exchanges.               Discharge information for CARF:    Reason for D/C: Maximum potential met (reduced attendance)  Was care interrupted for medical reason?: No  Did the patient demonstrate increased independence: Yes  Demonstration of independence:: Increased functional activity  Has the patient returned to productive activity?: No

## 2024-10-30 NOTE — PROGRESS NOTES
Speech-Language Pathology Discharge      SLP DISCHARGE NOTE FOR OUTPATIENT THERAPY    Patient: Jennifer Sams MRN: [id number]  : 1945 79 y.o.  Referring Physician: Ney Aparicio,*  Date of Visit: 10/29/2024      Certification Dates: 24 through 10/30/24    Total Visit Count: 10     Diagnosis:   1. Aphasia    2. Cerebrovascular accident (CVA), unspecified mechanism (CMS/HCC)        Chief Complaints:   Chief Complaint   Patient presents with    Speech Problem     Aphasia limits expressive and receptive communication.        Precautions:   Precautions Comments: Aphasia    TODAY'S VISIT:    Session Time:  Start Time: 1615  Stop Time: 1700  Time Calculation (min): 45 min   General Information - 10/29/24 1606          Session Details    Document Type discharge evaluation     Mode of Treatment individual therapy        General Information    Onset of Illness/Injury or Date of Surgery 24     Referring Physician Dr. Aparicio     History of present illness/functional impairment  is a 79 y.o. female who presented to [facility] on  after she was not seen by her friends for a few days.  Ms. Sams lives alone and when she was found by EMS she was confused and combative.  In the emergency department she was aphasic with right-sided weakness and hypertensive to the 190 systolic.  CT scan of the head revealed an acute infarct in left MCA with mild bleeding.  Echo showed an EF of 43% and a bubble study was negative.  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 Pt transferred to  [facility] on 2023 Principal problem  Acute ischemic left MCA stroke (CMS/HCC). PMH AAA, heart block s/p cardiac pacemaker, glaucoma, hyperlipidemia, coronary artery disease,  Transferred to outpatient and evaluated at [facility] on 2024. Following spech and language therapy over the last 10 months Ms. Sams returned to speech therapy for a trial of an AAC device to aid in  communication and further support language and speech remediation.     Precautions Comments Aphasia     Limitations/Impairments vision difficulty;difficulty hearing;safety/cognitive;other (see comments)     Interventions Language and speech impairment- learning AAC device     Patient/Family/Caregiver Comments/Observations Pt POA discussed personal obstacles contributing in decreased ability to attend speech therapy for AAC device consistently.  Both are in agreement with discharge at this time.                    Daily Falls Screen - 10/29/24 1645          Daily Falls Assessment    Patient reported fall since last visit No                    Pain and Vitals - 10/29/24 1644          Pain Assessment    Currently in pain No/Denies                    SLP - 10/29/24 1609          Speech Therapy    Speech Therapy Specialty Traditional Neuro Program ST   AAC- Aphasia       SLP Frequency and Duration    Frequency of treatment 2 times/week     Speech Duration 3 months     SLP Cert From 07/31/24     SLP Cert To 10/30/24     Date SLP POC was sent to provider 07/31/24     Signed SLP Plan of Care received?  Yes                    Assessment - 10/30/24 1044          Assessment    Plan of Care reviewed and patient/family in agreement Yes     Comments Pt and POA are in agreement with discharge at this time.     Demonstrates Need for Referral to Another Service psychology services;therapeutic recreation services;clinical nutrition services/dietitian;assistive technology     Clinical Assessment Ms. Sams presents to [facility], SLP for discharge session from language therapy targeting augmentative and alternative communication- speech generated device. Ms. Sams's moderate motor speech deficits and Moderate Expressive and Receptive Aphasia with limitations in reading and writing limiting Ms. Sams's communication success, made Ms. Sams a candidate for a [employer] TouchTalk device.  Secondary to family illness, and hardships pt was  limited to attend 10 sessions.  Within those 10 sessions Ms. Sams has demonstrated the ability to work basic functions of the device, turning on/off, managing volume, selecting icons for various functions (ie, talking, internet, speech practice, drawing board, etc), calibrating finger tap and swipe for icon selection and page scan.  Ms. Sams, provided assistance from friends continues to build vocabulary and page sequences to promote communication for basic wants, needs, ADLs, express feelings, orientation, share basic information. Ms. Sams will continue to benefit from support from friends to explore opportunities across community settings and setting up successful interactions while utilizing the SGD-AAC.  Ms. Sams will also benefit from reinforcement understanding the primary goal of the device is to convey messages with communication partners, rather than primarily improve neurocommunicative speech and language through own articulators.     Plan and Recommendations Discharge is recommended at this time. Additional support with new prescription may be considered with improve patient interest and flexibility to alternative communication resources.                     OBJECTIVE MEASUREMENTS/DATA:    OM: FCM/Voice/Neuro    Outcome Measures - 10/29/24 1626          Functional Communication Measures    FCM: Augmentative/Alternative Communication 3-->Level 3                     Outcome Measures          5/14/2024    21:32 7/9/2024    15:08 7/31/2024    20:38 9/4/2024    09:14 10/29/2024    16:26   SLP Outcome Measures   FCM: Augmentative/Alternative Communication   1-->Level 1 2-->Level 2 3-->Level 3   FCM: Motor Speech 4-->Level 4       approaching 5 4-->Level 4 4-->Level 4     FCM: Reading 5-->Level 5 5-->Level 5 5-->Level 5     FCM: Spoken Language, Comprehension 5-->Level 5 5-->Level 5 5-->Level 5     FCM: Spoken Language, Expression 4-->Level 4 4-->Level 4 4-->Level 4         Today's Treatment:    Education  provided:  Yes: Methods: Discussion, Handout, Demonstration, and Video  Readiness: acceptance  Response: Needs reinforcement      COGNITION FLOW SHEET  LANGUAGE  CPT 72610   PN: 8/30/2024  POC: 10/30/2024 10/29/2024     SHORT TERM GOALS PROGRESS TOWARD GOALS CURRENT SESSION   POA will demonstrate Operational Competence- Supervision, pt with min A  Powering the device on/off  Adjusting the volume  Accessing the information on the screen; visual field scanning, direct selection, proprioceptive feedback and forced calibration  Developing proficiency in using the system efficiently Turned on - Mod A  Noted with minimally reduced direct selection and forced calibration Min cues.New device    Patient:  Pt demonstrated this date.Powering the device on/off S    Talk  screen. (I)    Pt demonstrating scanning of visual field, direct selection and proprioceptive feedback. (S)     At times pt notes with swiping attempt to move screens rather than selecting pages. Min A     New device: Mod A cues to push 'softer' button and hold 3-5 sec    Practiced   'back' button  Deleted  Video icons  Scrolling through screen options  Managing how many screens per icon/folder.              INITIATE COMMUNICATION:  Pt will use the AAC device to initiate a conversation 3 out of 5 opportunities across natural and simulated settings, min A   (friends, healthcare provider, stranger, neighbor, member of community)  Orientation:  Name: Verbal +             AAC : my info Ma A  Address: Verbal: Verbal: -            AAC: Min A My info  Birthday: Verbal: no            AAC:   Cats: people  Added brief narrative.   Can you please help me:      Vacuum, clean the litter    Added icon to describe pain often experienced on right side of body and how to share intensity of the pain using a pain scale.    Practice Fast Talk- to share needs more time to respond    Practiced 4 pages deep to share had a stroke and has aphasia and uses device to talk      Used 3  "pages to select groceries- [employer]- [employer] foods will need.    Practice Fast Talk- to share needs more time to respond    Practiced 4 pages deep to share had a stroke and has aphasia and uses device to talk      Used 3 pages to select groceries- [employer]- [employer] will need.  Pt is not initiating communication with the device.  Added a folder for her friend César and started conversation starters.     Added a folder in places to eat- for wanting to stay home- eat BBQ    Initiated set up to initiate conversation with X2 friends.  Will add another friend for total of 3.  Encouraged to practice that selection to initiate over next few days.    Also will practice dining and eating folders to select what she would like to eat.      Pt added:  Help-\"help with my babies\"-clip loan's nails, get cat food ready, change out the litter   Added [employer] and common grocery lists from different stores.  Discussed ability to group ie. Shrimp and ham in meat Folder.     Explored card library  -actions  -calendar  -weather  Discussed hygiene items,clothing for shopping.      Discussed considering making categories:  Folder: Meat  Cards: shrimp, ham    Discussed using language starters on store pages  -I need  -I want  -Can we get  -   SYMBOL RECOGNITION  Pt will correctly identify and select symbols or icons on the AAC devcie for:  basic needs  Orientation  feelings/frustrations  novel responses  share information   with 80% accuracy, min A Explored additional medical descriptions     Explored orientation responses.      Added feelings-frustration this date.    Practiced 'other icons\" to explore and recognize vocabulary    Added feelings: mad, annoyed, nervous, happy, satisfied, sad, lonely, tired, frustrated- pt noted with difficulty selecting appropriate icon to de    Added folders for Cats, Marisa and Loan    Visited cards briefly and encouraged pt and friend to explore folders for:  Orientation  Feelings/frustrations    Added " "folder in interest- I like horror movies- Vincent Strickland movies    Added to pt's story that she went to [facility].     Brainstorm basic needs orientation and feelings. Noted with difficulty understanding why icons have 2-word phrases   LANGUAGE SYSTEMS   Pt will use single meaning folders and cards within TouchChat to communicate in single words, single meaning prepared scripts, and syntax phrases, min A.  Single words, prepared scripts.    Paired 2 icons for syntax    Single words, prepared scripts.      Not using syntax at this time, however, practiced   \"I don't like...\"  \"Tomatoes.     Demonstrated taking a photo to label a folder/card, discussed recorded narrative or script     Practicing learning single words and simple scripts with single icons.    Used single meaning words, prepared scripts and 2 icon communication.      IMPROVING ACCURACY  Pt will improve their accuracy in selecting the correct symbols on the SGD device by 20% over the next 3 months. F6- Mod I     Pt received own device this date with large font keyboard, bluetooth keyboard and mouse and stylus and weighted stylus    Pt exhibited interest in therapy ted.  Reduced response to cues from therapist to promote speech    Education/ Strategy Training:  Discussed some maladaptive behaviors and reduced self regulation resulting in maladaptive behaviors.     Thelma encouraged not to engage in identified behaviors and encourage strategies for self regulation in order to build adaptive skills.      Thelma verbalized understanding.     Encouraged POA to make a file for the  (name- pt's preferred ) and make an icon that says, \"I would like a chocolate martini.\"    Verbalized understanding    Education provided: discussing that device is to supplement language as an alternative means.  Discussed with POA that although device may stimulate language to continue to expand, it's purpose is to augment language skills.     will benefit from " further education/training  Discharge discussed with pt and POA.  Discussion of appropriateness for return to therapy.      Pt and POA expressed understanding.        Other: Therapist to consult with [employer] consultant re:  Setting up calendar to discuss orientation questions.   Setting up digits for 10's, 100's value.   Inability to access internet  Option for print larger when making icons,  If pt can say words, (ie yes, no, hello, goodbye) should we replace or remove Pt is progressing with use and would benefit from device.         Goals:     Goals Addressed                   This Visit's Progress     COMPLETED: Language- AAC Goal          Long Term Goals   Time Frame  Result  Comment/Progress    Pt will improve FCM in the following areas:  Augmentative/Alternative Communication  Current: 1  Goal: 3-4   12  DC:  10/29/2024  Met     3   EXPANDING VOCABULARY  The pt will use the AAC device to expand their functional vocabulary, adding and using 10 new words or phrases appropriately in daily communication  12   DC:  10/29/2024  Not Met-       Pt continues to explore and add topics/categories for daily use:  Folders:  Friends/pets:   Calendar  Pain  Grocery shopping  Feelings  Hospital story  Personal information  Restaurants    Encouraged to add vocab as experiences come up- halloween, some practice phrases to say to others.   Added a new favorite restaurant this date      SOCIAL INTERRACTION  The pt will use the SGDdevice to participate in social interactions, such as greeting peers, asking questions, and making comments, in 80% of observed opportunities over a 3  month period  12   DC:  10/29/2024  Not Met-    When are we going to dinner again.  How are   Halloween comments.   Communication is typically in response with support   SIMPLE PHRASE CONSTRUCTION  The pt will use the SGD device to construct and communicate simple phrases in 70% of appropriate contexts  12   DC:  10/29/2024  Not Met-   Initiated:    I  have:   I feel:   I need:  I would like:     INDEPENDENCE  The pt will use the AAC device independently in a variety of settings (home and community)for different purposes  (social, personal) with 90% success over a 3 month period  12   PN: 9/3/2024  Met  Mostly home and with friends. Exploring medical, restaurants.    DAILY ROUTINES  Pt will use the SGD device to communicate during daily routines with minimal prompting in 80% of opportunities  12   DC:  10/29/2024  Not Met- Moderate prompting    improving with support from POA.  Practicing set up to use out in the community.    REPAIRING COMMUNICATION BREAKDOWNS  Pt esequiel use the SGD device to repair communication breakdowns in 70% of instnaces when misunderstandings occur 12 DC:  10/29/2024  Not Met-    Pt supports will demonstrate appropriate stratiegies to support pts use of the SGD device, such as providing wait time and modeling use, in 90% of observed interactions.  12 DC:  10/29/2024   Met-  MET    Pt familiar with icon to share need for extra time and to explain why she uses the device   PEER TRAINING  Peers will engage in supported interactions using the AAC device showing undersandings and patience in 3 out of 4 observed opportunities.  12 DC:  10/29/2024   Met- X2 close friends attend regularly to assist in implementation. Pt will work with device to less familiar person, friend's wife.         Short Term Goals Time Frame Result Comment/Progress   POA will demonstrate Operational Competence- Supervision, pt with min A  Powering the device on/off  Adjusting the volume  Accessing the information on the screen; visual field scanning, direct selection, proprioceptive feedback and forced calibration  Developing proficiency in using the system efficiently 4 w DC:  10/29/2024  Met  Pt: Min A to supervision        INITIATE COMMUNICATION:  Pt will use the AAC device to initiate a conversation 3 out of 5 opportunities across natural and simulated settings, min A    (friends, healthcare provider, stranger, neighbor, member of community)  8 w DC:  10/29/2024  Not Met Hesitant to use outside of small group of people.   Encouraged to use with healthcare providers.   Simulated at Mod to max A   SYMBOL RECOGNITION  Pt will correctly identify and select symbols or icons on the AAC devcie for:  basic needs  Orientation  feelings/frustrations  novel responses  share information   with 80% accuracy, min A 8 w DC:  10/29/2024  Partially Met basic needs  Orientation  feelings/frustrations Mod I    Increased difficulty finding icons more than 2 pages deep    novel responses  share information - mod A   LANGUAGE SYSTEMS   Pt will use single meaning folders and cards within TouchChat to communicate in single words, single meaning prepared scripts, and syntax phrases, min A.  12 w DC:  10/29/2024  Partially Met Min A  Max A to use sytax- phrases  Provided ideas I have, I need, see if they have, I want.    IMPROVING ACCURACY  Pt will improve their accuracy in selecting the correct symbols on the SGD device by 20% over the next 3 months.  8 w DC:  10/29/2024 Min to mod A    Continues to need practice.                  [facility]-AAC  Level: 3  The individual usually requires minimal cueing and additional time to use augmentative alternative communication to convey simple messages related to personal wants/needs with familiar communication partners, although accuracy may vary.  The communication partner must assume responsibility for structuring most communication exchanges.               Discharge information for CARF:    Reason for D/C: Maximum potential met (reduced attendance)  Was care interrupted for medical reason?: No  Did the patient demonstrate increased independence: Yes  Demonstration of independence:: Increased functional activity  Has the patient returned to productive activity?: No

## 2025-02-04 ENCOUNTER — APPOINTMENT (EMERGENCY)
Dept: RADIOLOGY | Facility: HOSPITAL | Age: 80
End: 2025-02-04
Payer: MEDICARE

## 2025-02-04 ENCOUNTER — HOSPITAL ENCOUNTER (EMERGENCY)
Facility: HOSPITAL | Age: 80
Discharge: HOME | End: 2025-02-04
Attending: EMERGENCY MEDICINE | Admitting: EMERGENCY MEDICINE
Payer: MEDICARE

## 2025-02-04 VITALS
DIASTOLIC BLOOD PRESSURE: 87 MMHG | WEIGHT: 108 LBS | HEIGHT: 69 IN | OXYGEN SATURATION: 98 % | TEMPERATURE: 98 F | SYSTOLIC BLOOD PRESSURE: 144 MMHG | BODY MASS INDEX: 16 KG/M2 | HEART RATE: 72 BPM | RESPIRATION RATE: 18 BRPM

## 2025-02-04 DIAGNOSIS — R42 DIZZINESS: Primary | ICD-10-CM

## 2025-02-04 LAB
ALBUMIN SERPL-MCNC: 4.1 G/DL (ref 3.5–5.7)
ALP SERPL-CCNC: 59 IU/L (ref 34–125)
ALT SERPL-CCNC: 12 IU/L (ref 7–52)
ANION GAP SERPL CALC-SCNC: 3 MEQ/L (ref 3–15)
AST SERPL-CCNC: 22 IU/L (ref 13–39)
BACTERIA URNS QL MICRO: ABNORMAL /HPF
BASOPHILS # BLD: 0.02 K/UL (ref 0.01–0.1)
BASOPHILS NFR BLD: 0.4 %
BILIRUB SERPL-MCNC: 0.7 MG/DL (ref 0.3–1.2)
BILIRUB UR QL STRIP.AUTO: NEGATIVE MG/DL
BUN SERPL-MCNC: 10 MG/DL (ref 7–25)
CALCIUM SERPL-MCNC: 9.6 MG/DL (ref 8.6–10.3)
CHLORIDE SERPL-SCNC: 107 MEQ/L (ref 98–107)
CLARITY UR REFRACT.AUTO: CLEAR
CO2 SERPL-SCNC: 29 MEQ/L (ref 21–31)
COLOR UR AUTO: COLORLESS
CREAT SERPL-MCNC: 0.8 MG/DL (ref 0.6–1.2)
DIFFERENTIAL METHOD BLD: ABNORMAL
EGFRCR SERPLBLD CKD-EPI 2021: >60 ML/MIN/1.73M*2
EOSINOPHIL # BLD: 0.07 K/UL (ref 0.04–0.36)
EOSINOPHIL NFR BLD: 1.4 %
ERYTHROCYTE [DISTWIDTH] IN BLOOD BY AUTOMATED COUNT: 12.4 % (ref 11.7–14.4)
GLUCOSE SERPL-MCNC: 93 MG/DL (ref 70–99)
GLUCOSE UR STRIP.AUTO-MCNC: NEGATIVE MG/DL
HCT VFR BLD AUTO: 45.8 % (ref 35–45)
HGB BLD-MCNC: 14.8 G/DL (ref 11.8–15.7)
HGB UR QL STRIP.AUTO: NEGATIVE
HYALINE CASTS #/AREA URNS LPF: ABNORMAL /LPF
IMM GRANULOCYTES # BLD AUTO: 0.01 K/UL (ref 0–0.08)
IMM GRANULOCYTES NFR BLD AUTO: 0.2 %
KETONES UR STRIP.AUTO-MCNC: NEGATIVE MG/DL
LEUKOCYTE ESTERASE UR QL STRIP.AUTO: 1
LYMPHOCYTES # BLD: 1.49 K/UL (ref 1.2–3.5)
LYMPHOCYTES NFR BLD: 30.1 %
MCH RBC QN AUTO: 31 PG (ref 28–33.2)
MCHC RBC AUTO-ENTMCNC: 32.3 G/DL (ref 32.2–35.5)
MCV RBC AUTO: 95.8 FL (ref 83–98)
MONOCYTES # BLD: 0.54 K/UL (ref 0.28–0.8)
MONOCYTES NFR BLD: 10.9 %
MUCOUS THREADS URNS QL MICRO: ABNORMAL /LPF
NEUTROPHILS # BLD: 2.82 K/UL (ref 1.7–7)
NEUTS SEG NFR BLD: 57 %
NITRITE UR QL STRIP.AUTO: NEGATIVE
NRBC BLD-RTO: 0 %
PH UR STRIP.AUTO: 6 [PH]
PLATELET # BLD AUTO: 175 K/UL (ref 150–369)
PMV BLD AUTO: 8.8 FL (ref 9.4–12.3)
POTASSIUM SERPL-SCNC: 4.2 MEQ/L (ref 3.5–5.1)
PROT SERPL-MCNC: 6.6 G/DL (ref 6–8.2)
PROT UR QL STRIP.AUTO: NEGATIVE
RBC # BLD AUTO: 4.78 M/UL (ref 3.93–5.22)
RBC #/AREA URNS HPF: ABNORMAL /HPF
SODIUM SERPL-SCNC: 139 MEQ/L (ref 136–145)
SP GR UR REFRACT.AUTO: 1.01
SQUAMOUS URNS QL MICRO: ABNORMAL /HPF
TROPONIN I SERPL HS-MCNC: 4.5 PG/ML
TROPONIN I SERPL HS-MCNC: 4.7 PG/ML
TSH SERPL DL<=0.05 MIU/L-ACNC: 2.36 MIU/L (ref 0.34–5.6)
UROBILINOGEN UR STRIP-ACNC: 0.2 EU/DL
WBC # BLD AUTO: 4.95 K/UL (ref 3.8–10.5)
WBC #/AREA URNS HPF: ABNORMAL /HPF

## 2025-02-04 PROCEDURE — 63700000 HC SELF-ADMINISTRABLE DRUG: Performed by: EMERGENCY MEDICINE

## 2025-02-04 PROCEDURE — 85025 COMPLETE CBC W/AUTO DIFF WBC: CPT | Performed by: EMERGENCY MEDICINE

## 2025-02-04 PROCEDURE — 71045 X-RAY EXAM CHEST 1 VIEW: CPT

## 2025-02-04 PROCEDURE — 85025 COMPLETE CBC W/AUTO DIFF WBC: CPT

## 2025-02-04 PROCEDURE — 84484 ASSAY OF TROPONIN QUANT: CPT

## 2025-02-04 PROCEDURE — 84443 ASSAY THYROID STIM HORMONE: CPT | Performed by: EMERGENCY MEDICINE

## 2025-02-04 PROCEDURE — 81001 URINALYSIS AUTO W/SCOPE: CPT | Performed by: EMERGENCY MEDICINE

## 2025-02-04 PROCEDURE — 93005 ELECTROCARDIOGRAM TRACING: CPT

## 2025-02-04 PROCEDURE — 84484 ASSAY OF TROPONIN QUANT: CPT | Mod: 91 | Performed by: EMERGENCY MEDICINE

## 2025-02-04 PROCEDURE — 25800000 HC PHARMACY IV SOLUTIONS: Performed by: EMERGENCY MEDICINE

## 2025-02-04 PROCEDURE — 93005 ELECTROCARDIOGRAM TRACING: CPT | Performed by: EMERGENCY MEDICINE

## 2025-02-04 PROCEDURE — 84484 ASSAY OF TROPONIN QUANT: CPT | Performed by: EMERGENCY MEDICINE

## 2025-02-04 PROCEDURE — 36415 COLL VENOUS BLD VENIPUNCTURE: CPT

## 2025-02-04 PROCEDURE — 87086 URINE CULTURE/COLONY COUNT: CPT | Performed by: EMERGENCY MEDICINE

## 2025-02-04 PROCEDURE — 70450 CT HEAD/BRAIN W/O DYE: CPT

## 2025-02-04 PROCEDURE — 80053 COMPREHEN METABOLIC PANEL: CPT | Performed by: EMERGENCY MEDICINE

## 2025-02-04 PROCEDURE — 99284 EMERGENCY DEPT VISIT MOD MDM: CPT | Mod: 25

## 2025-02-04 PROCEDURE — 80053 COMPREHEN METABOLIC PANEL: CPT

## 2025-02-04 RX ORDER — CEFUROXIME AXETIL 500 MG/1
500 TABLET ORAL 2 TIMES DAILY
Qty: 14 TABLET | Refills: 0 | Status: SHIPPED | OUTPATIENT
Start: 2025-02-04 | End: 2025-02-11

## 2025-02-04 RX ORDER — CEFUROXIME AXETIL 250 MG/1
500 TABLET ORAL ONCE
Status: COMPLETED | OUTPATIENT
Start: 2025-02-04 | End: 2025-02-04

## 2025-02-04 RX ADMIN — CEFUROXIME AXETIL 500 MG: 250 TABLET ORAL at 18:12

## 2025-02-04 RX ADMIN — SODIUM CHLORIDE 500 ML: 9 INJECTION, SOLUTION INTRAVENOUS at 17:59

## 2025-02-04 ASSESSMENT — ENCOUNTER SYMPTOMS
ALTERED MENTAL STATUS: 1
DIZZINESS: 1

## 2025-02-04 NOTE — ED PROVIDER NOTES
"Emergency Medicine Note  HPI   HISTORY OF PRESENT ILLNESS     This is a 80-year-old female with past medical history significant for CVA, chronic R sided weakness, on Eliquis, who presents to the ED with episodic dizziness, mild confusion, unsteady gait and urinary frequency. Patient accompanied by friend who provides majority of the history as patient has expressive aphasia since her CVA.  No recent changes in her speech.  Friend states patient has had urinary frequency for about the past one week. Has appeared more tired and \"forgetful\" the last few days and has complained of feeling unsteady with ambulation. Dizziness appears to be triggered by bending over or leaning backwards. Patient denies lightheadedness or LOC. Of note, patient had 2 ground-level falls within the past one week. Had a fall several days ago, denies head strike, LOC or other injuries from the fall. Had another fall today, denies any associated injuries. Denies recent fevers, headache, vision changes, CP, SOB, abdominal pain, nausea/vomiting/diarrhea, dysuria, hematuria.           Dizziness  Altered Mental Status            Patient History   PAST HISTORY     Reviewed from Nursing Triage:       Past Medical History:   Diagnosis Date    AAA (abdominal aortic aneurysm) (CMS/[facility])     Arthritis     Elevated blood pressure reading without diagnosis of hypertension 04/19/2019    Epistaxis 01/06/2020    GERD (gastroesophageal reflux disease) 04/19/2019    Glaucoma 04/19/2019    Heart murmur     Hiatal hernia     History of hip replacement, total, right 04/19/2019    Right hip 9/ 2016 and revision 2017     History of rheumatic fever as a child 04/19/2019    Hypercholesterolemia 04/19/2019    Ischemic cardiomyopathy 05/09/2019    Kidney cysts     MI (myocardial infarction) (CMS/[facility])     Osteoarthritis of multiple joints 04/19/2019    Osteoporosis     Pacemaker 12/09/2019    Raynaud's disease 04/19/2019    RSD (reflex sympathetic dystrophy) 04/19/2019    " Of left foot    SOB (shortness of breath) 12/10/2019    Status post insertion of drug eluting coronary artery stent 05/09/2019    Stroke (CMS/HCC)        Past Surgical History   Procedure Laterality Date    Cholecystectomy open      Coronary angioplasty with stent placement  04/29/2019    of LAD    Coronary angioplasty with stent placement  04/30/2019    of RCA    Dilation and curettage of uterus      Eye surgery      RIGHT CATARACT    FFR - initial vessel N/A 2/12/2021    Performed by Noé Moncada MD at [facility] [place] CARDIAC CATH/EP    FFR/iFR - initial vessel N/A 11/15/2019    Performed by Noé Moncada MD at [facility] [place] CARDIAC CATH/EP    Foot surgery Left     Joint replacement Right 09/2016    total hip    Left heart cath with coronary angiography N/A 11/15/2019    Performed by Noé Moncada MD at [facility] [place] CARDIAC CATH/EP    Left heart cath with coronary angiography N/A 4/29/2019    Performed by Noé Moncada MD at [facility] [place] CARDIAC CATH/EP    PPM - dual chamber new N/A 11/18/2019    Performed by Jermaine Carolina MD at [facility] [place] CARDIAC CATH/EP    Revision total hip arthroplasty Right 2017    RIGHT & LEFT HEART CATH WITH CORONARY ANGIOGRAPHY N/A 2/12/2021    Performed by Noé Moncada MD at [facility] [place] CARDIAC CATH/EP    Stent FRANK coronary - initial vessel N/A 5/2/2019    Performed by Noé Moncada MD at [facility] [place] CARDIAC CATH/EP    Stent FRANK coronary - initial vessel N/A 4/30/2019    Performed by Noé Moncada MD at [facility] [place] CARDIAC CATH/EP    Stent FRANK coronary - initial vessel N/A 4/29/2019    Performed by Noé Moncada MD at [facility] [place] CARDIAC CATH/EP    Tonsillectomy         Family History   Problem Relation Name Age of Onset    Congenital heart disease Biological Mother          noted at autopsy    Pulmonary fibrosis Biological Father      Heart attack Paternal Grandfather         Social History     Tobacco Use    Smoking status: Never    Smokeless tobacco: Never   Vaping Use    Vaping status: Never Used   Substance Use Topics    Alcohol use: Not Currently     Drug use: No         Review of Systems   REVIEW OF SYSTEMS     Review of Systems   Neurological:  Positive for dizziness.         VITALS     ED Vitals      Date/Time Temp Pulse Resp BP SpO2 [facility]   02/04/25 1519 36.7 °C (98 °F) 72 18 144/87 98 % [facility]                         Physical Exam   PHYSICAL EXAM     Physical Exam  Vitals and nursing note reviewed.   Constitutional:       General: She is not in acute distress.     Appearance: She is well-developed. She is not ill-appearing, toxic-appearing or diaphoretic.   HENT:      Head: Normocephalic and atraumatic.      Nose: No congestion or rhinorrhea.      Mouth/Throat:      Mouth: Mucous membranes are moist.   Eyes:      Extraocular Movements: Extraocular movements intact.      Conjunctiva/sclera: Conjunctivae normal.      Pupils: Pupils are equal, round, and reactive to light.   Cardiovascular:      Rate and Rhythm: Normal rate and regular rhythm.      Heart sounds: Normal heart sounds.   Pulmonary:      Effort: Pulmonary effort is normal. No respiratory distress.      Breath sounds: Normal breath sounds.   Abdominal:      Palpations: Abdomen is soft.      Tenderness: There is no abdominal tenderness.   Musculoskeletal:         General: No tenderness.      Right lower leg: No edema.      Left lower leg: No edema.   Skin:     General: Skin is warm and dry.      Capillary Refill: Capillary refill takes less than 2 seconds.   Neurological:      Mental Status: She is alert and oriented to person, place, and time.      Motor: No weakness.      Gait: Gait abnormal.           PROCEDURES     Procedures     DATA     Results       Procedure Component Value Units Date/Time    TSH 3rd Generation [[id number]]  (Normal) Collected: 02/04/25 1528    Specimen: Blood, Venous Updated: 02/04/25 1837     TSH 2.36 mIU/L     Urinalysis with Reflex Culture [[id number]]  (Abnormal) Collected: 02/04/25 1658    Specimen: Urine, Clean Catch Updated: 02/04/25 1722    Narrative:      The following  orders were created for panel order Urinalysis with Reflex Culture.  Procedure                               Abnormality         Status                     ---------                               -----------         ------                     UA Reflex to Culture (Ma...[[id number]]  Abnormal            Final result               UA Microscopic[[id number]]               Abnormal            Final result                 Please view results for these tests on the individual orders.    UA Microscopic [[id number]]  (Abnormal) Collected: 02/04/25 1658    Specimen: Urine, Clean Catch Updated: 02/04/25 1722     RBC, Urine 0 TO 4 /HPF      WBC, Urine 4 TO 10 /HPF      Squamous Epithelial Rare /hpf      Hyaline Cast None Seen /lpf      Bacteria, Urine Rare /HPF      Mucus Rare /LPF     UA Reflex to Culture (Macroscopic) [[id number]]  (Abnormal) Collected: 02/04/25 1658    Specimen: Urine, Clean Catch Updated: 02/04/25 1714     Color, Urine Colorless     Clarity, Urine Clear     Specific Gravity, Urine 1.011     pH, Urine 6.0     Leukocyte Esterase +1     Nitrite, Urine Negative     Protein, Urine Negative     Glucose, Urine Negative mg/dL      Ketones, Urine Negative mg/dL      Urobilinogen, Urine 0.2 EU/dL      Bilirubin, Urine Negative mg/dL      Blood, Urine Negative     Comment: The sensitivity of the occult blood test is equivalent to approximately 4 intact RBC/HPF.       HS Troponin I (with 2 hour reflex) [[id number]]  (Normal) Collected: 02/04/25 1528    Specimen: Blood, Venous Updated: 02/04/25 1607     High Sens Troponin I 4.5 pg/mL     Comprehensive metabolic panel [[id number]]  (Normal) Collected: 02/04/25 1528    Specimen: Blood, Venous Updated: 02/04/25 1601     Sodium 139 mEQ/L      Potassium 4.2 mEQ/L      Comment: Results obtained on plasma. Plasma Potassium values may be up to 0.4 mEQ/L less than serum values. The differences may be greater for patients with high platelet or white cell counts.        Chloride 107  mEQ/L      CO2 29 mEQ/L      BUN 10 mg/dL      Creatinine 0.8 mg/dL      Glucose 93 mg/dL      Calcium 9.6 mg/dL      AST (SGOT) 22 IU/L      ALT (SGPT) 12 IU/L      Alkaline Phosphatase 59 IU/L      Total Protein 6.6 g/dL      Comment: Test performed on plasma which typically contains approximately 0.4 g/dL more protein than serum.        Albumin 4.1 g/dL      Bilirubin, Total 0.7 mg/dL      eGFR >60.0 mL/min/1.73m*2      Comment: Calculation based on the Chronic Kidney Disease Epidemiology Collaboration (CKD-EPI) equation refit without adjustment for race.        Anion Gap 3 mEQ/L     CBC and differential [[id number]]  (Abnormal) Collected: 02/04/25 1528    Specimen: Blood, Venous Updated: 02/04/25 1549     WBC 4.95 K/uL      RBC 4.78 M/uL      Hemoglobin 14.8 g/dL      Hematocrit 45.8 %      MCV 95.8 fL      MCH 31.0 pg      MCHC 32.3 g/dL      RDW 12.4 %      Platelets 175 K/uL      MPV 8.8 fL      Differential Type Auto     nRBC 0.0 %      Immature Granulocytes 0.2 %      Neutrophils 57.0 %      Lymphocytes 30.1 %      Monocytes 10.9 %      Eosinophils 1.4 %      Basophils 0.4 %      Immature Granulocytes, Absolute 0.01 K/uL      Neutrophils, Absolute 2.82 K/uL      Lymphocytes, Absolute 1.49 K/uL      Monocytes, Absolute 0.54 K/uL      Eosinophils, Absolute 0.07 K/uL      Basophils, Absolute 0.02 K/uL     [facility] Draw Panel [[id number]] Collected: 02/04/25 1528    Specimen: Blood, Venous Updated: 02/04/25 1534    Narrative:      The following orders were created for panel order [place] Draw Panel.  Procedure                               Abnormality         Status                     ---------                               -----------         ------                     VDI Space LT BLUE[[id number]]                                  In process                   Please view results for these tests on the individual orders.    VDI Space LT BLUE [[id number]] Collected: 02/04/25 1528    Specimen: Blood, Venous Updated:  02/04/25 1534            Imaging Results              CT HEAD WITHOUT IV CONTRAST (Final result)  Result time 02/04/25 18:04:50      Final result                   Impression:    IMPRESSION:  No CT evidence of large acute territorial infarct, intracranial hemorrhage, mass  or mass effect  Redemonstration of a large chronic infarct in the posterior left MCA territory  and additional involutional changes, as above.    MRI may be considered for further evaluation, if clinical concern persists.                   Narrative:      CLINICAL HISTORY: Confusion, dizziness. Recent falls without head injury    COMMENT:    Technique: Computed tomography of the brain was performed utilizing contiguous  2.5mm transaxial sections without intravenous contrast administration.  CT DOSE:  One or more dose reduction techniques (e.g. automated exposure  control, adjustment of the mA and/or kV according to patient size, use of  iterative reconstruction technique) utilized for this examination.    Comparison studies: CT of the brain dated 8/12/2024    There is redemonstration of a large chronic infarct in the posterior aspect of  the left MCA territory with associated encephalomalacia and ex vacuo dilatation  of the left lateral ventricle. Ventricles and cortical sulci are otherwise  enlarged, compatible with age-related involution. Patchy periventricular and  subcortical white matter hypodensity, nonspecific but likely on the basis of  chronic microangiopathic change. No evidence of acute territorial infarct or  hemorrhage. There is no evidence of mass, mass-effect, midline shift or  extra-axial fluid collection. Bilateral lens implants. Calvarium and  extracranial soft tissues appear intact. Visualized paranasal sinuses are clear.  The mastoid air cells are clear.                                       X-RAY CHEST 1 VIEW (Final result)  Result time 02/04/25 17:33:09      Final result                   Impression:    IMPRESSION: No  evidence of acute disease.               Narrative:    CLINICAL HISTORY: Dizziness.    COMMENT: Single PA erect chest radiograph was obtained and was compared with  prior studies, most recent dated 6/26/2023.    Cardiac silhouette is normal in size. Hilar and mediastinal contours are within  normal limits. Probable coronary artery stents superimposed over the heart.  Dual-lead pacemaker overlies and obscures a portion of the left hemithorax. Lung  fields appear clear. No evidence of pneumothorax or pleural effusion. Regional  osseous structures are grossly intact.                                      ECG 12 lead          Scoring tools                                  ED Course & MDM   MDM / ED COURSE / CLINICAL IMPRESSION / DISPO     Medical Decision Making  This is a 80-year-old female with past medical history significant for CVA, chronic R sided weakness, on Eliquis, who presents to the ED with episodic dizziness, mild confusion, unsteady gait and urinary frequency.     On arrival, patient found to be hemodynamically stable without hypoxia on room air, afebrile. On exam, patient is alert and oriented x 3, expressive aphasia at baseline s/p CVA. She is non-toxic appearing. Required minimal assistance when walking from waiting room to intake room, though reported feeling unsteady on her feet. Unable to describe dizziness further.  Grossly there are no focal neurologic deficits appreciated.  ED workup is notable for UTI otherwise is unremarkable. CT head shows no acute findings. CXR shows no evidence of pleural effusion, pulmonary edema or focal consolidation.  Based on my history, exam and workup, I have very low suspicion for ACS or acute CVA at this time.  Suspect presentation is likely secondary to UTI, will treat outpatient with antibiotics.     Patient told nurse that she would like to go home. Prior to discharge, we discussed strict return precautions for worsening clinical course.  Patient verbalized  understanding of return precautions and is agreeable with plan for discharge with PCP follow-up.     Please see ED course for active MDM including reassessments, results, discussions with specialists, and disposition.       Amount and/or Complexity of Data Reviewed  External Data Reviewed: labs, radiology, ECG and notes.  Labs: ordered. Decision-making details documented in ED Course.  Radiology: ordered. Decision-making details documented in ED Course.  ECG/medicine tests: ordered. Decision-making details documented in ED Course.    Risk  Prescription drug management.        ED Course as of 02/13/25 1932 Tue Feb 04, 2025 1654 ECG 12 lead  Shows atrial sensed, ventricular paced, no STEMI per Sgarbossa criteria [AR]   1722 High Sens Troponin I: 4.5 [AR]   1930 CT HEAD WITHOUT IV CONTRAST  No CT evidence of large acute territorial infarct, intracranial hemorrhage, mass  or mass effect  Redemonstration of a large chronic infarct in the posterior left MCA territory  and additional involutional changes, as above.   [AR]      ED Course User Index  [AR] Eileen Colón DO     Clinical Impression      Dizziness     _________________       ED Disposition   Discharge                       Eileen Colón,   Resident  02/13/25 1925       Eileen Colón,   Resident  02/13/25 1927       Eileen Colón,   Resident  02/13/25 1928       Eileen Colón, DO  Resident  02/13/25 1930       Eileen Colón,   Resident  02/13/25 1932

## 2025-02-05 LAB
ATRIAL RATE: 62
BACTERIA UR CULT: ABNORMAL
BACTERIA UR CULT: ABNORMAL
P AXIS: 79
PR INTERVAL: 192
QRS DURATION: 182
QT INTERVAL: 494
QTC CALCULATION(BAZETT): 501
R AXIS: -57
T WAVE AXIS: 90
VENTRICULAR RATE: 62

## 2025-02-05 NOTE — DISCHARGE INSTRUCTIONS
You were seen in the Emergency Department for dizziness. Your exam, imaging and lab tests do not suggest a dangerous cause of your symptoms at this time. We recommend you follow-up with your primary care doctor as scheduled for ongoing management of your symptoms. Discuss CT head findings with your primary care provider.

## 2025-02-07 NOTE — ED ATTESTATION NOTE
I have personally seen and examined Jennifer Sams.  I was involved in the care and medical decision making for this patient.    I reviewed and agree with physician assistant / nurse practitioner’s assessment and plan of care; any exceptions are documented below.      My focused history, examination, assessment and plan of care of Jennifer Sams is as follows:    Brief History:  HPI  80-year-old female with history of GERD, prior CVA with residual right-sided weakness, aphasia presents to the emergency department for evaluation of possible increased weakness in her right leg, lightheadedness, increased urinary frequency.  There is no change in her speech.  The patient denies any increased numbness.  She is accompanied to the emergency department by her friend who is concerned that she has had some increased generalized weakness.  The patient reportedly had 2 ground-level falls over the past 1 week though did not hit her head or lose consciousness.  The patient denies any chest pain or shortness of breath.  No nausea or vomiting.  No recent fevers. No dysuria though + increased frequency.      Focused Physical Exam:  Physical Exam  Vitals and nursing note reviewed.   Constitutional:       General: She is in acute distress.      Appearance: She is well-developed and normal weight. She is not toxic-appearing.   HENT:      Head: Normocephalic and atraumatic.      Mouth/Throat:      Mouth: Mucous membranes are moist.   Eyes:      Extraocular Movements: Extraocular movements intact.      Pupils: Pupils are equal, round, and reactive to light.   Cardiovascular:      Rate and Rhythm: Normal rate and regular rhythm.      Heart sounds: Normal heart sounds. No murmur heard.  Pulmonary:      Effort: Pulmonary effort is normal.      Breath sounds: Normal breath sounds. No wheezing, rhonchi or rales.   Abdominal:      Palpations: Abdomen is soft.      Tenderness: There is no abdominal tenderness. There is no guarding.    Musculoskeletal:         General: Normal range of motion.      Cervical back: Normal range of motion and neck supple.   Skin:     General: Skin is warm and dry.      Capillary Refill: Capillary refill takes less than 2 seconds.      Findings: No rash.   Neurological:      Mental Status: She is alert. Mental status is at baseline.      Cranial Nerves: Cranial nerve deficit present.      Motor: Weakness present.      Comments: Rt sided weakness, aphasia at baseline per friend   Psychiatric:         Mood and Affect: Mood normal.         Behavior: Behavior normal.             Assessment / Plan:        Medical Decision Making  Differential is broad and includes dehydration, metabolic derangements, possible UTI.  Lower suspicion for acute CVA, posterior stroke, ACS, arrhythmia. Neuro exam appears to be at baseline. Gait is steady.  Will check labs including urinalysis, head CT, EKG and will gently hydrate with IV fluids.  Will reassess.    Amount and/or Complexity of Data Reviewed  Labs: ordered. Decision-making details documented in ED Course.  Radiology: ordered. Decision-making details documented in ED Course.  ECG/medicine tests: ordered. Decision-making details documented in ED Course.    Risk  Prescription drug management.        I was physically present for the key/critical portions of the following procedures:  Procedures           Giles Espino MD  02/07/25 [id number]

## 2025-02-17 ENCOUNTER — TELEPHONE (OUTPATIENT)
Dept: CARDIOLOGY | Facility: CLINIC | Age: 80
End: 2025-02-17
Payer: MEDICARE

## 2025-02-17 NOTE — TELEPHONE ENCOUNTER
SD/LG- Please advise, thank you.     I called the patient/remigio- I LVM asking her to call [phone]# back to discuss symptoms. TY    The contact for the patient & friend remigio are listed as same #. TY.

## 2025-02-17 NOTE — TELEPHONE ENCOUNTER
"Gaetano pt - Received call from BERRY Renee asking if Dr. Salter would be able to see pt in office.    Pt was seen in ED 2/4/25 for confusion and dizziness.  Pt was dx UTI and placed on antibx which pt has completed.    Pt concerned and states that she thinks something is wrong with heart.  Pt is having some increase SOB more then usual. Pt is still feeling weak, wore out and shaky.  Pt is saying \"she feels worst and not better\" since taking antibx.  No daily weights done.  No BP/HR at home. Weight 108 in ED and per Thelma her weight was higher at the PCP 2/5    Pt was seen by PCP on 2/5 after ED visit.  Instructed need to call PCP to discuss symptoms as well.    Thelma can be reached [phone]  "

## 2025-02-18 NOTE — TELEPHONE ENCOUNTER
"SD/ANDRAE- NEERAJ, thank you.     The patient's friend Themla called me back-   Still complains of dizziness with ambulation and position changes. The patient denies vertigo.     The dizziness is more more frequent. \"She is still has some level of confusion and unable to remember things she normally able to process or remember\".     PCP recommendation made to to take her to ED, patient refused. They also advised a   Neuro OV. Unable to see her until March.     I told her we can order an echo, but Dr. Salter does not feel that what's going on is cardiac related. She verbalized understanding.     I reinforced need for ED evaluation with acute changes and unable to obtain rapid answers on an outpatient basis. She verbalized understanding. She said she will discuss with patient again today & see if she will go back to [facility].     She will call me tomorrow with update. TY.  "

## 2025-02-18 NOTE — TELEPHONE ENCOUNTER
SD/JS- Yes, the number I called was listed for both & I LVM.     I called again today & LVM asking Thelma to call me back directly to see how the patient is doing and set up a echo. I gave her my direct number to call back. TY.

## 2025-02-20 ENCOUNTER — HOSPITAL ENCOUNTER (EMERGENCY)
Facility: HOSPITAL | Age: 80
Discharge: HOME | End: 2025-02-21
Attending: EMERGENCY MEDICINE | Admitting: EMERGENCY MEDICINE
Payer: MEDICARE

## 2025-02-20 ENCOUNTER — APPOINTMENT (EMERGENCY)
Dept: RADIOLOGY | Facility: HOSPITAL | Age: 80
End: 2025-02-20
Attending: EMERGENCY MEDICINE
Payer: MEDICARE

## 2025-02-20 ENCOUNTER — APPOINTMENT (EMERGENCY)
Dept: RADIOLOGY | Facility: HOSPITAL | Age: 80
End: 2025-02-20
Payer: MEDICARE

## 2025-02-20 DIAGNOSIS — R06.00 DYSPNEA, UNSPECIFIED TYPE: ICD-10-CM

## 2025-02-20 DIAGNOSIS — R00.2 PALPITATIONS: ICD-10-CM

## 2025-02-20 DIAGNOSIS — R41.0 CONFUSION: ICD-10-CM

## 2025-02-20 DIAGNOSIS — R42 LIGHTHEADEDNESS: Primary | ICD-10-CM

## 2025-02-20 DIAGNOSIS — J18.9 PNEUMONIA DUE TO INFECTIOUS ORGANISM, UNSPECIFIED LATERALITY, UNSPECIFIED PART OF LUNG: ICD-10-CM

## 2025-02-20 DIAGNOSIS — R93.89 ABNORMAL CT SCAN: ICD-10-CM

## 2025-02-20 LAB
ALBUMIN SERPL-MCNC: 4.9 G/DL (ref 3.5–5.7)
ALP SERPL-CCNC: 59 IU/L (ref 34–125)
ALT SERPL-CCNC: 19 IU/L (ref 7–52)
ANION GAP SERPL CALC-SCNC: 8 MEQ/L (ref 3–15)
AST SERPL-CCNC: 35 IU/L (ref 13–39)
ATRIAL RATE: 63
BACTERIA URNS QL MICRO: 1 /HPF
BASOPHILS # BLD: 0.03 K/UL (ref 0.01–0.1)
BASOPHILS NFR BLD: 0.5 %
BILIRUB SERPL-MCNC: 0.8 MG/DL (ref 0.3–1.2)
BILIRUB UR QL STRIP.AUTO: NEGATIVE MG/DL
BNP SERPL-MCNC: 63 PG/ML
BUN SERPL-MCNC: 15 MG/DL (ref 7–25)
CALCIUM SERPL-MCNC: 9.7 MG/DL (ref 8.6–10.3)
CHLORIDE SERPL-SCNC: 106 MEQ/L (ref 98–107)
CLARITY UR REFRACT.AUTO: CLEAR
CO2 SERPL-SCNC: 24 MEQ/L (ref 21–31)
COHGB MFR BLD: 0.8 %
COLOR UR AUTO: YELLOW
CREAT SERPL-MCNC: 0.8 MG/DL (ref 0.6–1.2)
DIFFERENTIAL METHOD BLD: ABNORMAL
EGFRCR SERPLBLD CKD-EPI 2021: >60 ML/MIN/1.73M*2
EOSINOPHIL # BLD: 0.06 K/UL (ref 0.04–0.36)
EOSINOPHIL NFR BLD: 1 %
ERYTHROCYTE [DISTWIDTH] IN BLOOD BY AUTOMATED COUNT: 12.5 % (ref 11.7–14.4)
FLUAV RNA SPEC QL NAA+PROBE: NEGATIVE
FLUBV RNA SPEC QL NAA+PROBE: NEGATIVE
GLUCOSE BLD-MCNC: 94 MG/DL (ref 70–99)
GLUCOSE SERPL-MCNC: 95 MG/DL (ref 70–99)
GLUCOSE UR STRIP.AUTO-MCNC: NEGATIVE MG/DL
HCT VFR BLD AUTO: 45 % (ref 35–45)
HGB BLD-MCNC: 15 G/DL (ref 11.8–15.7)
HGB UR QL STRIP.AUTO: NEGATIVE
HYALINE CASTS #/AREA URNS LPF: ABNORMAL /LPF
IMM GRANULOCYTES # BLD AUTO: 0.01 K/UL (ref 0–0.08)
IMM GRANULOCYTES NFR BLD AUTO: 0.2 %
KETONES UR STRIP.AUTO-MCNC: NEGATIVE MG/DL
LEUKOCYTE ESTERASE UR QL STRIP.AUTO: 2
LYMPHOCYTES # BLD: 1.48 K/UL (ref 1.2–3.5)
LYMPHOCYTES NFR BLD: 24.8 %
MAGNESIUM SERPL-MCNC: 1.7 MG/DL (ref 1.8–2.5)
MCH RBC QN AUTO: 31.4 PG (ref 28–33.2)
MCHC RBC AUTO-ENTMCNC: 33.3 G/DL (ref 32.2–35.5)
MCV RBC AUTO: 94.1 FL (ref 83–98)
MONOCYTES # BLD: 0.53 K/UL (ref 0.28–0.8)
MONOCYTES NFR BLD: 8.9 %
MUCOUS THREADS URNS QL MICRO: 1 /LPF
NEUTROPHILS # BLD: 3.85 K/UL (ref 1.7–7)
NEUTS SEG NFR BLD: 64.6 %
NITRITE UR QL STRIP.AUTO: NEGATIVE
NRBC BLD-RTO: 0 %
P AXIS: 76
PH UR STRIP.AUTO: 5.5 [PH]
PLATELET # BLD AUTO: 139 K/UL (ref 150–369)
PMV BLD AUTO: 8.9 FL (ref 9.4–12.3)
POCT TEST: NORMAL
POTASSIUM SERPL-SCNC: 4.8 MEQ/L (ref 3.5–5.1)
PR INTERVAL: 192
PROT SERPL-MCNC: 8 G/DL (ref 6–8.2)
PROT UR QL STRIP.AUTO: 1
QRS DURATION: 180
QT INTERVAL: 482
QTC CALCULATION(BAZETT): 493
R AXIS: -52
RBC # BLD AUTO: 4.78 M/UL (ref 3.93–5.22)
RBC #/AREA URNS HPF: ABNORMAL /HPF
RSV RNA SPEC QL NAA+PROBE: NEGATIVE
SARS-COV-2 RNA RESP QL NAA+PROBE: NEGATIVE
SODIUM SERPL-SCNC: 138 MEQ/L (ref 136–145)
SP GR UR REFRACT.AUTO: 1.03
SQUAMOUS URNS QL MICRO: ABNORMAL /HPF
T WAVE AXIS: 94
TROPONIN I SERPL HS-MCNC: 2.8 PG/ML
TROPONIN I SERPL HS-MCNC: 3.9 PG/ML
UROBILINOGEN UR STRIP-ACNC: 0.2 EU/DL
VENTRICULAR RATE: 63
WBC # BLD AUTO: 5.96 K/UL (ref 3.8–10.5)
WBC #/AREA URNS HPF: ABNORMAL /HPF

## 2025-02-20 PROCEDURE — 83880 ASSAY OF NATRIURETIC PEPTIDE: CPT | Performed by: EMERGENCY MEDICINE

## 2025-02-20 PROCEDURE — 74177 CT ABD & PELVIS W/CONTRAST: CPT

## 2025-02-20 PROCEDURE — 99284 EMERGENCY DEPT VISIT MOD MDM: CPT | Mod: 25

## 2025-02-20 PROCEDURE — 82375 ASSAY CARBOXYHB QUANT: CPT | Performed by: EMERGENCY MEDICINE

## 2025-02-20 PROCEDURE — 80053 COMPREHEN METABOLIC PANEL: CPT

## 2025-02-20 PROCEDURE — 84484 ASSAY OF TROPONIN QUANT: CPT | Mod: 91

## 2025-02-20 PROCEDURE — 70450 CT HEAD/BRAIN W/O DYE: CPT

## 2025-02-20 PROCEDURE — 71045 X-RAY EXAM CHEST 1 VIEW: CPT

## 2025-02-20 PROCEDURE — 36415 COLL VENOUS BLD VENIPUNCTURE: CPT

## 2025-02-20 PROCEDURE — 85025 COMPLETE CBC W/AUTO DIFF WBC: CPT | Performed by: EMERGENCY MEDICINE

## 2025-02-20 PROCEDURE — 87637 SARSCOV2&INF A&B&RSV AMP PRB: CPT

## 2025-02-20 PROCEDURE — 81001 URINALYSIS AUTO W/SCOPE: CPT | Performed by: EMERGENCY MEDICINE

## 2025-02-20 PROCEDURE — 63600105 HC IODINE BASED CONTRAST: Mod: JW | Performed by: EMERGENCY MEDICINE

## 2025-02-20 PROCEDURE — 93005 ELECTROCARDIOGRAM TRACING: CPT

## 2025-02-20 PROCEDURE — 87086 URINE CULTURE/COLONY COUNT: CPT | Performed by: EMERGENCY MEDICINE

## 2025-02-20 PROCEDURE — 84484 ASSAY OF TROPONIN QUANT: CPT | Mod: 91 | Performed by: EMERGENCY MEDICINE

## 2025-02-20 PROCEDURE — 87637 SARSCOV2&INF A&B&RSV AMP PRB: CPT | Performed by: EMERGENCY MEDICINE

## 2025-02-20 PROCEDURE — 84484 ASSAY OF TROPONIN QUANT: CPT

## 2025-02-20 PROCEDURE — 93005 ELECTROCARDIOGRAM TRACING: CPT | Performed by: EMERGENCY MEDICINE

## 2025-02-20 PROCEDURE — 93010 ELECTROCARDIOGRAM REPORT: CPT | Performed by: INTERNAL MEDICINE

## 2025-02-20 PROCEDURE — 85025 COMPLETE CBC W/AUTO DIFF WBC: CPT

## 2025-02-20 PROCEDURE — 80053 COMPREHEN METABOLIC PANEL: CPT | Performed by: EMERGENCY MEDICINE

## 2025-02-20 PROCEDURE — 83735 ASSAY OF MAGNESIUM: CPT | Performed by: PHYSICIAN ASSISTANT

## 2025-02-20 RX ORDER — IOPAMIDOL 755 MG/ML
100 INJECTION, SOLUTION INTRAVASCULAR
Status: COMPLETED | OUTPATIENT
Start: 2025-02-20 | End: 2025-02-20

## 2025-02-20 RX ADMIN — IOPAMIDOL 95 ML: 755 INJECTION, SOLUTION INTRAVENOUS at 22:41

## 2025-02-20 ASSESSMENT — ENCOUNTER SYMPTOMS: SHORTNESS OF BREATH: 1

## 2025-02-20 NOTE — TELEPHONE ENCOUNTER
SD/JS- FYI, thank you.     EP- FYI- see below. TY.     I called Thelma back & she reports that the patient is feeling worse & agreeable to ED evaluation. She is now c/o palpations & SOB with little exertion. Thelma getting patient in car at this time.     Device team- can you do a download & send to us & EP to review? TY.

## 2025-02-20 NOTE — TELEPHONE ENCOUNTER
Pt's friend Thelma calling back, she will be taking the pt to [facility] ED, please advise    Thelma: [phone]

## 2025-02-20 NOTE — TELEPHONE ENCOUNTER
SD/ANDRAE- FYI, TY.     I called the [facility] ED to provide report. I spoke to Yony (charge RN) in ED. He thanked me.

## 2025-02-20 NOTE — TELEPHONE ENCOUNTER
[employer] PM    Pt has not done remote monitoring since 6/5/23.  Her mobile ted is disconnected.  Will need to be re-educated on reconnecting, when she is feeling better.    If pt is on here way to ED, a consult can be requested for EP to check pacemaker.

## 2025-02-21 VITALS
DIASTOLIC BLOOD PRESSURE: 89 MMHG | RESPIRATION RATE: 16 BRPM | SYSTOLIC BLOOD PRESSURE: 195 MMHG | TEMPERATURE: 98.4 F | OXYGEN SATURATION: 99 % | HEART RATE: 60 BPM

## 2025-02-21 PROCEDURE — 63700000 HC SELF-ADMINISTRABLE DRUG: Performed by: EMERGENCY MEDICINE

## 2025-02-21 RX ORDER — DOXYCYCLINE 100 MG/1
100 CAPSULE ORAL 2 TIMES DAILY
Qty: 14 CAPSULE | Refills: 0 | Status: SHIPPED | OUTPATIENT
Start: 2025-02-21 | End: 2025-02-28

## 2025-02-21 RX ORDER — AMOXICILLIN AND CLAVULANATE POTASSIUM 875; 125 MG/1; MG/1
1 TABLET, FILM COATED ORAL ONCE
Status: COMPLETED | OUTPATIENT
Start: 2025-02-21 | End: 2025-02-21

## 2025-02-21 RX ORDER — DOXYCYCLINE HYCLATE 100 MG
100 TABLET ORAL ONCE
Status: COMPLETED | OUTPATIENT
Start: 2025-02-21 | End: 2025-02-21

## 2025-02-21 RX ORDER — AMOXICILLIN AND CLAVULANATE POTASSIUM 875; 125 MG/1; MG/1
1 TABLET, FILM COATED ORAL 2 TIMES DAILY
Qty: 14 TABLET | Refills: 0 | Status: SHIPPED | OUTPATIENT
Start: 2025-02-21 | End: 2025-02-28

## 2025-02-21 RX ADMIN — AMOXICILLIN AND CLAVULANATE POTASSIUM 1 TABLET: 875; 125 TABLET, FILM COATED ORAL at 02:47

## 2025-02-21 RX ADMIN — DOXYCYCLINE HYCLATE 100 MG: 100 TABLET ORAL at 02:47

## 2025-02-21 NOTE — DISCHARGE INSTRUCTIONS
RETURN IMMEDIATELY FOR ANY NEW OR WORSE SYMPTOMS;    REVIEW ANY LABS AND FINAL IMAGING RESULTS (SOMETIMES THESE MAY NOT BE BACK UNTIL TOMORROW)  WITH YOUR DOCTORS AT YOUR NEXT APPOINTMENT.    Reviewed the following preliminary CAT scan report with your doctor in the near future:  Chest CT:     Irregular subpleural density in the left lower lobe, posteriorly, measuring 17 mm in greatest diameter. The finding may represent a small peripheral consolidated infiltrate, such as due to pneumonia, but a neoplastic process cannot be ruled out and follow-up to complete resolution is indicated.     Tubular density in the right upper lobe, extending for 2 cm, probably representing impacted with focal bronchiectasis. Follow-up is recommended.     Increased markings in the lingula and left lower lobe anteriorly, somewhat more prominent than on the previous abdominal CT from 2022, probably representing a combination of atelectatic changes and scarring.     CT of the abdomen and pelvis:     No bowel distention, free fluid, free air or hydronephrosis.     Increased stool.     3   .2 cm AAA.

## 2025-02-21 NOTE — ED ATTESTATION NOTE
I have personally seen and examined the patient.   EHR/RN and PA hx reviewed    I reviewed and agree with the PA/NP's assessment and plan of care. Defer to my note for any discrepancies b/t us.     My examination, assessment, and plan of care of Jennifer Sams is as follows:    HPI-  2 weeks of shortness of breath with exertion, intermittent palpitations, per friend/POA some mild fogginess mentally which is unusual for her.    PE-  G- nad  p- cta b/l  cv- rrr  A- s, nt/nd, no g/r  Ext- no le assymetry/cords  ;trace ankle edema b/l    Data reviewed      At the time my evaluation patient is very eager to go home and wanted me to remove her IV immediately.  She ultimately was willing to stay for pacemaker interrogation.  I offered hospitalization to further evaluate her symptoms explaining I was concerned about the exertional component of it and felt like cardiology evaluation is appropriate (to screen for possible ACS, consideration for echo, etc.).  Patient adamant she wants to go home.  I will message her cardiologist to expedite out-pt f/u.  CT showing possible pna (vs.neoplasm)-  I rx'd abx in case this is pna--though this dx certainly not definitive.    Doubt adhf/malignant arrhythmia/acs/other acute emergency     Eliu Bernard MD  02/21/25 3363

## 2025-02-21 NOTE — ED PROVIDER NOTES
Emergency Medicine Note  HPI   HISTORY OF PRESENT ILLNESS     Patient is an 80-year-old female with a history of CVA with residual right-sided weakness and expressive aphasia, abdominal aortic aneurysm, hypertension, hyperlipidemia, ischemic cardiomyopathy, CAD presents to the ED with concerns of dizziness and lightheadedness, shortness of breath with exertion, weakness, palpitations and forgetfulness/fogginess.  She states this been going on for 2 weeks.  She has been evaluated outside hospital at which time she was told she had a UTI and started on antibiotics.  She has completed antibiotics but symptoms persist causing her to come to the ED for evaluation.  [place] [facility] [place]  Denies current chest pain.  She denies vertigo symptoms but describes a lightheaded sensation worse with activity.      History provided by:  Patient   used: No    Shortness of Breath  Severity:  Moderate  Onset quality:  Gradual  Timing:  Constant  Chronicity:  New  Relieved by:  Nothing  Associated symptoms: chest pain    Chest Pain  Associated symptoms: shortness of breath    Weakness - Generalized  Associated symptoms: chest pain and shortness of breath          Patient History   PAST HISTORY     Reviewed from Nursing Triage:       Past Medical History:   Diagnosis Date    AAA (abdominal aortic aneurysm) (CMS/[facility])     Arthritis     Elevated blood pressure reading without diagnosis of hypertension 04/19/2019    Epistaxis 01/06/2020    GERD (gastroesophageal reflux disease) 04/19/2019    Glaucoma 04/19/2019    Heart murmur     Hiatal hernia     History of hip replacement, total, right 04/19/2019    Right hip 9/ 2016 and revision 2017     History of rheumatic fever as a child 04/19/2019    Hypercholesterolemia 04/19/2019    Ischemic cardiomyopathy 05/09/2019    Kidney cysts     MI (myocardial infarction) (CMS/[facility])     Osteoarthritis of multiple joints 04/19/2019    Osteoporosis     Pacemaker  12/09/2019    Raynaud's disease 04/19/2019    RSD (reflex sympathetic dystrophy) 04/19/2019    Of left foot    SOB (shortness of breath) 12/10/2019    Status post insertion of drug eluting coronary artery stent 05/09/2019    Stroke (CMS/HCC)        Past Surgical History   Procedure Laterality Date    Cholecystectomy open      Coronary angioplasty with stent placement  04/29/2019    of LAD    Coronary angioplasty with stent placement  04/30/2019    of RCA    Dilation and curettage of uterus      Eye surgery      RIGHT CATARACT    FFR - initial vessel N/A 2/12/2021    Performed by Noé Moncada MD at [facility] [place] CARDIAC CATH/EP    FFR/iFR - initial vessel N/A 11/15/2019    Performed by Noé Moncada MD at [facility] [place] CARDIAC CATH/EP    Foot surgery Left     Joint replacement Right 09/2016    total hip    Left heart cath with coronary angiography N/A 11/15/2019    Performed by Noé Moncada MD at [facility] [place] CARDIAC CATH/EP    Left heart cath with coronary angiography N/A 4/29/2019    Performed by Noé Moncada MD at [facility] [place] CARDIAC CATH/EP    PPM - dual chamber new N/A 11/18/2019    Performed by Jermaine Carolina MD at [facility] [place] CARDIAC CATH/EP    Revision total hip arthroplasty Right 2017    RIGHT & LEFT HEART CATH WITH CORONARY ANGIOGRAPHY N/A 2/12/2021    Performed by Noé Moncada MD at [facility] [place] CARDIAC CATH/EP    Stent FRANK coronary - initial vessel N/A 5/2/2019    Performed by Noé Moncada MD at [facility] [place] CARDIAC CATH/EP    Stent FRANK coronary - initial vessel N/A 4/30/2019    Performed by Noé Moncada MD at [facility] [place] CARDIAC CATH/EP    Stent FRANK coronary - initial vessel N/A 4/29/2019    Performed by Noé Moncada MD at [facility] [place] CARDIAC CATH/EP    Tonsillectomy         Family History   Problem Relation Name Age of Onset    Congenital heart disease Biological Mother          noted at autopsy    Pulmonary fibrosis Biological Father      Heart attack Paternal Grandfather         Social History     Tobacco Use    Smoking status: Never    Smokeless tobacco: Never    Vaping Use    Vaping status: Never Used   Substance Use Topics    Alcohol use: Not Currently    Drug use: No         Review of Systems   REVIEW OF SYSTEMS     Review of Systems   Respiratory:  Positive for shortness of breath.    Cardiovascular:  Positive for chest pain.         VITALS     ED Vitals      Date/Time Temp Pulse Resp BP SpO2 [facility]   02/20/25 1959 -- 86 16 191/99 96 % LP   02/20/25 1408 -- 66 20 143/63 94 % JH   02/20/25 1223 36.9 °C (98.4 °F) 63 18 129/62 100 % TNC                         Physical Exam   PHYSICAL EXAM     Physical Exam  Vitals reviewed.   Constitutional:       Appearance: She is not ill-appearing.   HENT:      Head: Atraumatic.   Eyes:      Extraocular Movements: Extraocular movements intact.      Pupils: Pupils are equal, round, and reactive to light.   Cardiovascular:      Rate and Rhythm: Normal rate and regular rhythm.      Pulses: Normal pulses.   Pulmonary:      Effort: Pulmonary effort is normal.      Breath sounds: Normal breath sounds.   Musculoskeletal:      Right lower leg: No edema.      Left lower leg: No edema.   Skin:     General: Skin is warm.   Neurological:      General: No focal deficit present.      Mental Status: She is alert.      Comments: Residual right sided weakness, no acute weakness    Expressive aphasia   Psychiatric:         Mood and Affect: Mood normal.           PROCEDURES     Procedures     DATA     Results       Procedure Component Value Units Date/Time    Magnesium [[id number]]  (Abnormal) Collected: 02/20/25 1958    Specimen: Blood, Venous Updated: 02/20/25 2151     Magnesium 1.7 mg/dL     BNP (B-type natriuretic peptide) [[id number]]  (Normal) Collected: 02/20/25 1419    Specimen: Blood, Venous Updated: 02/20/25 1650     BNP 63 pg/mL     HS Troponin I (with 2 hour reflex) [[id number]]  (Normal) Collected: 02/20/25 1419    Specimen: Blood, Venous Updated: 02/20/25 1524     High Sens Troponin I 3.9 pg/mL     Comprehensive metabolic panel [[id number]]   (Normal) Collected: 02/20/25 1419    Specimen: Blood, Venous Updated: 02/20/25 1458     Sodium 138 mEQ/L      Potassium 4.8 mEQ/L      Comment: Results obtained on plasma. Plasma Potassium values may be up to 0.4 mEQ/L less than serum values. The differences may be greater for patients with high platelet or white cell counts.        Chloride 106 mEQ/L      CO2 24 mEQ/L      BUN 15 mg/dL      Creatinine 0.8 mg/dL      Glucose 95 mg/dL      Calcium 9.7 mg/dL      AST (SGOT) 35 IU/L      ALT (SGPT) 19 IU/L      Alkaline Phosphatase 59 IU/L      Total Protein 8.0 g/dL      Comment: Test performed on plasma which typically contains approximately 0.4 g/dL more protein than serum.        Albumin 4.9 g/dL      Bilirubin, Total 0.8 mg/dL      eGFR >60.0 mL/min/1.73m*2      Comment: Calculation based on the Chronic Kidney Disease Epidemiology Collaboration (CKD-EPI) equation refit without adjustment for race.        Anion Gap 8 mEQ/L     CBC and differential [[id number]]  (Abnormal) Collected: 02/20/25 1419    Specimen: Blood, Venous Updated: 02/20/25 1434     WBC 5.96 K/uL      RBC 4.78 M/uL      Hemoglobin 15.0 g/dL      Hematocrit 45.0 %      MCV 94.1 fL      MCH 31.4 pg      MCHC 33.3 g/dL      RDW 12.5 %      Platelets 139 K/uL      MPV 8.9 fL      Differential Type Auto     nRBC 0.0 %      Immature Granulocytes 0.2 %      Neutrophils 64.6 %      Lymphocytes 24.8 %      Monocytes 8.9 %      Eosinophils 1.0 %      Basophils 0.5 %      Immature Granulocytes, Absolute 0.01 K/uL      Neutrophils, Absolute 3.85 K/uL      Lymphocytes, Absolute 1.48 K/uL      Monocytes, Absolute 0.53 K/uL      Eosinophils, Absolute 0.06 K/uL      Basophils, Absolute 0.03 K/uL     SARS-COV-2 (COVID-19)/ FLU A/B, AND RSV, PCR Nasopharynx [[id number]]  (Normal) Collected: 02/20/25 1229    Specimen: Nasopharyngeal Swab from Nasopharynx Updated: 02/20/25 1334     SARS-CoV-2 (COVID-19) Negative     Influenza A Negative     Influenza B Negative      Respiratory Syncytial Virus Negative    Narrative:      Testing performed using real-time PCR for detection of COVID-19. EUA approved validation studies performed on site.             Imaging Results              X-RAY CHEST 1 VIEW (Final result)  Result time 02/20/25 13:52:05      Final result                   Impression:    IMPRESSION: No evidence of active disease.               Narrative:    CLINICAL HISTORY:  Chest Pain/Arrhythmia    COMMENT: Frontal view of the chest with comparison chest radiograph 2/4/2025.  Left chest wall dual-lead pacemaker.  No definite focal lung consolidation.  Cardiomediastinal silhouette is unchanged. No pneumothorax or pleural effusion.                                      ECG 12 lead          Scoring tools                                  ED Course & MDM   MDM / ED COURSE / CLINICAL IMPRESSION / DISPO     Medical Decision Making  Amount and/or Complexity of Data Reviewed  Labs: ordered. Decision-making details documented in ED Course.  Radiology: ordered and independent interpretation performed. Decision-making details documented in ED Course.  ECG/medicine tests: ordered.    Risk  Prescription drug management.        ED Course as of 02/21/25 0224   Thu Feb 20, 2025   2337 Carboxyhemoglobin: 0.8 [KR]   Fri Feb 21, 2025   0142 Pt offered hospitalization for further eval of exertional sob--she doesn't want admission; I emphasized importance of very close ut-pt f/u  [JF]   0216 I spoke to [employer] technician who confirmed there were no tachyarrhythmias detected on the device.   [JF]      ED Course User Index  [JF] Eliu Bernard MD  [KR] Bouchra Ritchie PA C     Clinical Impression      None                 Bouchra Ritchie PA C  02/25/25 0139

## 2025-02-22 LAB
BACTERIA UR CULT: NORMAL
BACTERIA UR CULT: NORMAL

## 2025-03-03 NOTE — TELEPHONE ENCOUNTER
Pt's POA calling to see if Dr. Salter would like to see pt or order tests. since ER visit. JONE Pascual can be reached at [phone]

## 2025-03-04 DIAGNOSIS — I51.3 LV (LEFT VENTRICULAR) MURAL THROMBUS: ICD-10-CM

## 2025-03-04 DIAGNOSIS — I50.42 CHRONIC COMBINED SYSTOLIC AND DIASTOLIC CHF (CONGESTIVE HEART FAILURE) (CMS/HCC): ICD-10-CM

## 2025-03-04 DIAGNOSIS — I25.10 CORONARY ARTERY DISEASE INVOLVING NATIVE CORONARY ARTERY OF NATIVE HEART WITHOUT ANGINA PECTORIS: ICD-10-CM

## 2025-03-04 DIAGNOSIS — R06.09 DOE (DYSPNEA ON EXERTION): Primary | ICD-10-CM

## 2025-03-04 NOTE — TELEPHONE ENCOUNTER
The confusion part of course is not cardiac related but the palps and SOB could be. Milo-can you coordinate with Tanisha bowen and appt together? I will place echo order now.

## 2025-03-05 NOTE — TELEPHONE ENCOUNTER
Spoke to pts  advocate and she is scheduled for echo and ov with anne for 3/19 @1030 and 6796  Thank you

## 2025-03-17 ENCOUNTER — APPOINTMENT (OUTPATIENT)
Age: 80
Setting detail: DERMATOLOGY
End: 2025-03-18

## 2025-03-17 DIAGNOSIS — D22 MELANOCYTIC NEVI: ICD-10-CM

## 2025-03-17 DIAGNOSIS — B07.8 OTHER VIRAL WARTS: ICD-10-CM

## 2025-03-17 DIAGNOSIS — L82.1 OTHER SEBORRHEIC KERATOSIS: ICD-10-CM

## 2025-03-17 DIAGNOSIS — L91.8 OTHER HYPERTROPHIC DISORDERS OF THE SKIN: ICD-10-CM

## 2025-03-17 DIAGNOSIS — L81.4 OTHER MELANIN HYPERPIGMENTATION: ICD-10-CM

## 2025-03-17 DIAGNOSIS — L57.8 OTHER SKIN CHANGES DUE TO CHRONIC EXPOSURE TO NONIONIZING RADIATION: ICD-10-CM

## 2025-03-17 DIAGNOSIS — D18.0 HEMANGIOMA: ICD-10-CM

## 2025-03-17 PROBLEM — D18.01 HEMANGIOMA OF SKIN AND SUBCUTANEOUS TISSUE: Status: ACTIVE | Noted: 2025-03-17

## 2025-03-17 PROBLEM — D22.61 MELANOCYTIC NEVI OF RIGHT UPPER LIMB, INCLUDING SHOULDER: Status: ACTIVE | Noted: 2025-03-17

## 2025-03-17 PROCEDURE — OTHER SUNSCREEN RECOMMENDATIONS: OTHER

## 2025-03-17 PROCEDURE — 99213 OFFICE O/P EST LOW 20 MIN: CPT

## 2025-03-17 PROCEDURE — OTHER COUNSELING: OTHER

## 2025-03-17 ASSESSMENT — LOCATION DETAILED DESCRIPTION DERM
LOCATION DETAILED: RIGHT PROXIMAL POSTERIOR UPPER ARM
LOCATION DETAILED: EPIGASTRIC SKIN
LOCATION DETAILED: RIGHT SUPERIOR UPPER BACK
LOCATION DETAILED: LEFT INFERIOR MEDIAL MIDBACK
LOCATION DETAILED: RIGHT INFERIOR LATERAL NECK
LOCATION DETAILED: LEFT CLAVICULAR NECK
LOCATION DETAILED: SUPERIOR THORACIC SPINE
LOCATION DETAILED: RIGHT ULNAR DORSAL HAND

## 2025-03-17 ASSESSMENT — LOCATION SIMPLE DESCRIPTION DERM
LOCATION SIMPLE: UPPER BACK
LOCATION SIMPLE: RIGHT ANTERIOR NECK
LOCATION SIMPLE: RIGHT UPPER BACK
LOCATION SIMPLE: RIGHT UPPER ARM
LOCATION SIMPLE: LEFT ANTERIOR NECK
LOCATION SIMPLE: ABDOMEN
LOCATION SIMPLE: LEFT LOWER BACK
LOCATION SIMPLE: RIGHT HAND

## 2025-03-17 ASSESSMENT — LOCATION ZONE DERM
LOCATION ZONE: TRUNK
LOCATION ZONE: NECK
LOCATION ZONE: HAND
LOCATION ZONE: ARM

## 2025-03-18 ENCOUNTER — OFFICE VISIT (OUTPATIENT)
Dept: NEUROLOGY | Facility: CLINIC | Age: 80
End: 2025-03-18
Payer: MEDICARE

## 2025-03-18 VITALS — SYSTOLIC BLOOD PRESSURE: 128 MMHG | DIASTOLIC BLOOD PRESSURE: 78 MMHG | HEART RATE: 76 BPM | OXYGEN SATURATION: 98 %

## 2025-03-18 DIAGNOSIS — I63.9 CEREBROVASCULAR ACCIDENT (CVA), UNSPECIFIED MECHANISM (CMS/HCC): ICD-10-CM

## 2025-03-18 DIAGNOSIS — I51.3 LEFT VENTRICULAR APICAL THROMBUS: ICD-10-CM

## 2025-03-18 DIAGNOSIS — I69.320 APHASIA FOLLOWING CEREBROVASCULAR ACCIDENT (CVA): Primary | ICD-10-CM

## 2025-03-18 PROCEDURE — G8752 SYS BP LESS 140: HCPCS | Performed by: STUDENT IN AN ORGANIZED HEALTH CARE EDUCATION/TRAINING PROGRAM

## 2025-03-18 PROCEDURE — 99215 OFFICE O/P EST HI 40 MIN: CPT | Performed by: STUDENT IN AN ORGANIZED HEALTH CARE EDUCATION/TRAINING PROGRAM

## 2025-03-18 PROCEDURE — G8754 DIAS BP LESS 90: HCPCS | Performed by: STUDENT IN AN ORGANIZED HEALTH CARE EDUCATION/TRAINING PROGRAM

## 2025-03-18 NOTE — PROGRESS NOTES
Neurology Stroke Clinic Progress Note     Patient Name: Jennifer Sams                                                                   : 1945   MRN: [id number]                                            Visit Date: 2025   Encounter Provider: Mendez Mckee MD      Date of Cerebrovascular Event   has a past medical history of AAA (abdominal aortic aneurysm) (CMS/[facility]), Arthritis, Elevated blood pressure reading without diagnosis of hypertension (2019), Epistaxis (2020), GERD (gastroesophageal reflux disease) (2019), Glaucoma (2019), Heart murmur, Hiatal hernia, hip replacement, total, right (2019), rheumatic fever as a child (2019), Hypercholesterolemia (2019), Ischemic cardiomyopathy (2019), Kidney cysts, MI (myocardial infarction) (CMS/[facility]), Osteoarthritis of multiple joints (2019), Osteoporosis, Pacemaker (2019), Raynaud's disease (2019), RSD (reflex sympathetic dystrophy) (2019), SOB (shortness of breath) (12/10/2019), Status post insertion of drug eluting coronary artery stent (2019), and Stroke (CMS/[facility]).    Subjective    Stroke in 2023 with apical thrombus on now on AC.Presented to the ED On 23 with aphasia. CTH with L MCA infarct with HT. Of note she was unable to get MRI due to pacemaker so serial CT's performed. On TTE, she was found to have LVEF of 40% with L ventricular apical thrombus. Of note she had an echo on 3/28/23 with basal and mid inferolateral akinesis however there was no thrombus. She was started on a heparin gtt then transitioned to Eliquis.     Used to follow with Dr. Aparicio as outpatient after her index hospitalization, last seen 24.    There is plan to keep her on AC indefinitely. Had 8 prior cardiac stents and needed to stop lavix due to nosebleeds. Was just on aspirin at visit in 3/23 prior to her stroke. Pacemaker did not show AF.      TTE 3/2024 with still apical thrombus. At visit with her cardiologist on 10/15/24, recommended to stay on lifelong AC  Current concerns:    Last 6 months, she feels like she can't say as many wors and can't speak as well.    She is accompanied by her friend who is her POA.    Doesn't comprehend as well as she prevoiusly did. Seems like she is regressing     Feels like R side is more heavy over the past few months. Has had two falls. Feels more worn out. Has been treated for UTI and PNA recently.     Can get confused at home. Would go to  a bowl and not know what to put in it.     She states she is unsure if she can get MRI due to pacer    Doesn't get out much. Does not converse much. Feels like she would benefit from speech therapy    Having repeat echo tomorrow.     Interval History      Review of Systems:  Negative except as noted in HPI      Medications:    Current Outpatient Medications:     apixaban (ELIQUIS) 5 mg tablet, Take 1 tablet (5 mg total) by mouth 2 (two) times a day Indications: treatment to prevent blood clots in chronic atrial fibrillation., Disp: 180 tablet, Rfl: 3    pantoprazole (PROTONIX) 40 mg EC tablet, Take 1 tablet (40 mg total) by mouth daily., Disp: 90 tablet, Rfl: 3    rosuvastatin (CRESTOR) 20 mg tablet, TAKE 1 TABLET BY MOUTH EVERY DAY, Disp: 90 tablet, Rfl: 3     Past Medical History: The patient  has a past medical history of AAA (abdominal aortic aneurysm) (CMS/[facility]), Arthritis, Elevated blood pressure reading without diagnosis of hypertension (04/19/2019), Epistaxis (01/06/2020), GERD (gastroesophageal reflux disease) (04/19/2019), Glaucoma (04/19/2019), Heart murmur, Hiatal hernia, History of hip replacement, total, right (04/19/2019), History of rheumatic fever as a child (04/19/2019), Hypercholesterolemia (04/19/2019), Ischemic cardiomyopathy (05/09/2019), Kidney cysts, MI (myocardial infarction) (CMS/[facility]), Osteoarthritis of multiple joints (04/19/2019), Osteoporosis,  Pacemaker (12/09/2019), Raynaud's disease (04/19/2019), RSD (reflex sympathetic dystrophy) (04/19/2019), SOB (shortness of breath) (12/10/2019), Status post insertion of drug eluting coronary artery stent (05/09/2019), and Stroke (CMS/[facility]).    Past Surgical History: The patient  has a past surgical history that includes Tonsillectomy; Eye surgery; Joint replacement (Right, 09/2016); Revision total hip arthroplasty (Right, 2017); Dilation and curettage of uterus; Foot surgery (Left); Cholecystectomy open; Coronary angioplasty with stent (04/29/2019); and Coronary angioplasty with stent (04/30/2019).    Social History: The patient   Social History     Tobacco Use    Smoking status: Never    Smokeless tobacco: Never   Vaping Use    Vaping status: Never Used   Substance Use Topics    Alcohol use: Not Currently    Drug use: No       Allergies: The patient is allergic to prednisone, ace inhibitors, atorvastatin, brilinta [ticagrelor], codeine, dilaudid [hydromorphone], morphine sulfate, onion, and oxycodone.      Anti-platelet:  None  Anti-coagulation: Eliquis 5 mg BID  Statin:    Crestor 20 mg daily   PT Needs N  OT Needs: N  SLP Needs Y  Living Arrangements: Independent, friend comes and is involved in a lot of her care  MRS: 2  Sleep issues: None        Physical Examination:  Vitals:    03/18/25 1524   BP: 128/78   Pulse: 76   SpO2: 98%       General Appearance: Age appropriate appearance without any acute clinical distress  Cardiac:  No complaints of chest pain  Skin: Warm    Neurologic Exam:  Mental Status:    Not oriented to month. Able to trace her age. Unable to repeat or name.     Cranial Nerves:  EOMI, VFF, nR facial droop (mild), and dysarthria present.     Tone:     Normal    Motor Drift    None    Motor Strength    Full in the upper and lower extremities except for R wrist extension 4/5      Sensory:    Intact to light touch      Coordination:    Finger nose finger and rapid foot tapping  intact    Gait:    Less movement in R leg           NIH Stroke Scale      1a  Level of consciousness: 0=alert; keenly responsive   1b. LOC questions:  1=Performs one task correctly   1c. LOC commands: 0=Performs both tasks correctly   2.  Best Gaze: 0=normal   3. Visual: 0=No visual loss   4. Facial Palsy: 2=Partial paralysis (total or near total paralysis of the lower face)   5a.  Motor left arm: 0=No drift, limb holds 90 (or 45) degrees for full 10 seconds   5b.  Motor right arm: 0=No drift, limb holds 90 (or 45) degrees for full 10 seconds   6a. motor left le=No drift, limb holds 90 (or 45) degrees for full 10 seconds   6b  Motor right le=No drift, limb holds 90 (or 45) degrees for full 10 seconds   7. Limb Ataxia: 0=Absent   8.  Sensory: 0=Normal; no sensory loss   9. Best Language:  1=Mild to moderate aphasia; some obvious loss of fluency or facility of comprehension without significant limitation on ideas expressed or form of expression.   10. Dysarthria: 1=Mild to moderate, patient slurs at least some words and at worst, can be understood with some difficulty   11. Extinction and Inattention: 0=No abnormality    Total:   5             Data/Results:      Imaging:     I have personally reviewed the images for the NeuroImaging studies listed below.     No results found for this or any previous visit.       CTH 23:  L MCA Infarct with HT    CTA 23:  No HGS Or LVO       Echocardiogram    Cardiac Imaging    TRANSTHORACIC ECHO (TTE) COMPLETE 03/15/2024    Interpretation Summary    Left Ventricle: Normal ventricle size. Normal wall thickness. Probable small apical thrombus. Mildly decreased systolic function. Estimated EF 45%. Basal and mid inferolateral akinesis, mid and apical anteroseptal/septal akinesis.  Abnormal septal motion. Normal diastolic filling pattern for age.    Aortic Valve: Valve not well seen but likely trileaflet.  Sclerotic leaflets. No regurgitation. Mild stenosis. Peak  velocity = 1.56 m/s. Mean gradient = 6.00 mmHg. Calculated area by cont eq = 1.23 cm2. Calculated dimensionless index = 0.32.    Aorta: Aortic root normal.    Right Ventricle: Ventricle not well visualized but probably normal size and at most mildly dilated. Pacer wire present. Normal systolic function.    Tricuspid Valve: Mild regurgitation. Estimated RVSP = 24 mmHg.    Pericardium: Evidence of a prominent fat pad. Small anterior pericardial effusion    Left Atrium: Normal sized atrium.    Right Atrium: Normal sized atrium.    Mitral Valve: Mild regurgitation.    Pulmonic Valve: Valve not well visualized. No regurgitation.    IVC/SVC: Normal sized inferior vena cava. Inferior vena cava collapses >50% during inspiration.    Compared with June 2023, no significant change.    TTE 6/27/23;  Left ventricle with normal dimensions and regional contraction abnormalities consistent with CAD in the LAD distribution: Localized apical infarction with aneurysm formation.  Paradoxical septal movement is consistent with ventricular pacing.  There is moderate left ventricular systolic and mild diastolic dysfunction.  LVEF 43%  Left ventricular apical thrombus  Left atrial pressure 14 mmHg  There is a visual suggestion of mild aortic stenosis. (Doppler assessment of the aortic valve is suboptimal)  Mild mitral regurgitation  Normal right ventricle dimensions and systolic function: TAPSE 1.84 cm  Mild tricuspid regurgitation  PASP 34 mmHg  Normal left atrial volume index  No pericardial effusion or vegetations  AS-V pacing  Technically difficult study: No parasternal imaging obtained  This study was performed with Definity contrast to optimize evaluation of regional and global left ventricular function  No significant change from 3/28/2023 except for current demonstration of left ventricular apical thrombus         Pertinent Lab Results Reviewed       I have reviewed the pertinent labs:      CBC with Diff  WBC   Date Value Ref Range  Status   02/20/2025 5.96 3.80 - 10.50 K/uL Final     RBC   Date Value Ref Range Status   02/20/2025 4.78 3.93 - 5.22 M/uL Final     HGB   Date Value Ref Range Status   02/20/2025 15.0 11.8 - 15.7 g/dL Final     HCT   Date Value Ref Range Status   02/20/2025 45.0 35.0 - 45.0 % Final     MCV   Date Value Ref Range Status   02/20/2025 94.1 83.0 - 98.0 fL Final     MCH   Date Value Ref Range Status   02/20/2025 31.4 28.0 - 33.2 pg Final     MCHC   Date Value Ref Range Status   02/20/2025 33.3 32.2 - 35.5 g/dL Final     RDW   Date Value Ref Range Status   02/20/2025 12.5 11.7 - 14.4 % Final     PLT   Date Value Ref Range Status   02/20/2025 139 (L) 150 - 369 K/uL Final     MPV   Date Value Ref Range Status   02/20/2025 8.9 (L) 9.4 - 12.3 fL Final     NEUTROABS   Date Value Ref Range Status   02/20/2025 3.85 1.70 - 7.00 K/uL Final     MONOABS   Date Value Ref Range Status   02/20/2025 0.53 0.28 - 0.80 K/uL Final     EOSABS   Date Value Ref Range Status   02/20/2025 0.06 0.04 - 0.36 K/uL Final     BASOABS   Date Value Ref Range Status   02/20/2025 0.03 0.01 - 0.10 K/uL Final     NEUTROPHILS   Date Value Ref Range Status   02/20/2025 64.6 % Final     LYMPHOCYTES   Date Value Ref Range Status   02/20/2025 24.8 % Final     MONOCYTES   Date Value Ref Range Status   02/20/2025 8.9 % Final     EOSINOPHILS   Date Value Ref Range Status   02/20/2025 1.0 % Final     BASOPHILS   Date Value Ref Range Status   02/20/2025 0.5 % Final       CMP Results         02/20/25 02/04/25 08/12/24     1419 1528 1522     139 139    K 4.8 4.2 4.1    Cl 106 107 106    CO2 24 29 27    Glucose 95 93 117    BUN 15 10 16    Creatinine 0.8 0.8 0.9    Calcium 9.7 9.6 9.7    Anion Gap 8 3 6    AST 35 22 18    ALT 19 12 9    Albumin 4.9 4.1 4.2    EGFR >60.0 >60.0 >60.0           Comment for K at 1419 on 02/20/25: Results obtained on plasma. Plasma Potassium values may be up to 0.4 mEQ/L less than serum values. The differences may be greater for  "patients with high platelet or white cell counts.    Comment for K at 1528 on 02/04/25: Results obtained on plasma. Plasma Potassium values may be up to 0.4 mEQ/L less than serum values. The differences may be greater for patients with high platelet or white cell counts.    Comment for K at 1522 on 08/12/24: Results obtained on plasma. Plasma Potassium values may be up to 0.4 mEQ/L less than serum values. The differences may be greater for patients with high platelet or white cell counts.    Comment for EGFR at 1419 on 02/20/25: Calculation based on the Chronic Kidney Disease Epidemiology Collaboration (CKD-EPI) equation refit without adjustment for race.    Comment for EGFR at 1528 on 02/04/25: Calculation based on the Chronic Kidney Disease Epidemiology Collaboration (CKD-EPI) equation refit without adjustment for race.    Comment for EGFR at 1522 on 08/12/24: Calculation based on the Chronic Kidney Disease Epidemiology Collaboration (CKD-EPI) equation refit without adjustment for race.            Lab Most recent values   Ha1C Lab Results   Component Value Date    HGBA1C 5.4 06/26/2023      Cholesterol Lipid Panel:  Lab Results   Component Value Date    CHOL 178 06/26/2023    CHOL 163 03/31/2023    CHOL 156 02/04/2022    TRIG 93 06/26/2023    TRIG 87 03/31/2023    TRIG 107 02/04/2022    HDL 71 06/26/2023    HDL 80 03/31/2023    HDL 71 02/04/2022    LDLCALC 88 06/26/2023    LDLCALC 66 03/31/2023    LDLCALC 66 02/04/2022       INR Lab Results   Component Value Date    INR 1.1 06/28/2023      TSH Lab Results   Component Value Date    TSH 2.36 02/04/2025      BNP Lab Results   Component Value Date    BNP 63 02/20/2025      Trop/CK Lab Results   Component Value Date    HSTROPONINI 2.8 02/20/2025      D-dimer No results found for: \"DDIMER\"   ESR No results found for: \"SEDRATE\"   RPR No results found for: \"RPR\", \"RPRTITER\"   Other:      Urine Drug Screen Results    No lab values to display.                  Lab Results "   Component Value Date    [id number] 182 07/02/2023        Lab Results   Component Value Date    FOLATE >20.0 07/02/2023          Assessment/Discussion:    L MCA infarct 2/2 LV thrombus. Her 2024 echo showed persistence of her LV thrombus necessitating continued AC with plans for lifelong AC. She had had worsening of her aphasia and confusion  and we discussed this may be due to recent illness however with persistence of her LV thrombus, we discussed repeating an MRI to make sure no new embolic showering has occurred as if this is the case, I will discuss the utility of switching anticoagulants with her cardiology team. We discussed that social isolation in her case with decreased need for speech as such can hinder stroke recovery. I believe she may benefit from speech therapy in this regard.       Recommendations/Plan:  Repeat MRI given worsening speech and intermittent confusion. She will talk with her cardiologist about feasability with pacemaker. If this is higher risk with her pacemaker, I do not think the benefit of getting an MRI would be worth it but it is worth exploring the idea. Speech therapy referral  placed.   LDL<55   A1C<6.5  BP<130/80    The patient will return to Clinic in 6 months, or sooner if clinically indicated.    I spent 40 minutes on this date of service performing the following activities: obtaining history, performing examination, documenting, preparing for visit, obtaining/reviewing records, providing counseling and education, communicating results and coordinating care.

## 2025-03-19 ENCOUNTER — OFFICE VISIT (OUTPATIENT)
Dept: CARDIOLOGY | Facility: CLINIC | Age: 80
End: 2025-03-19
Payer: MEDICARE

## 2025-03-19 ENCOUNTER — HOSPITAL ENCOUNTER (OUTPATIENT)
Dept: CARDIOLOGY | Facility: HOSPITAL | Age: 80
Discharge: HOME | End: 2025-03-19
Attending: INTERNAL MEDICINE
Payer: MEDICARE

## 2025-03-19 VITALS
HEIGHT: 69 IN | DIASTOLIC BLOOD PRESSURE: 76 MMHG | WEIGHT: 108 LBS | SYSTOLIC BLOOD PRESSURE: 126 MMHG | BODY MASS INDEX: 16 KG/M2 | HEART RATE: 60 BPM

## 2025-03-19 DIAGNOSIS — R93.89 ABNORMAL CAT SCAN: ICD-10-CM

## 2025-03-19 DIAGNOSIS — I51.3 LV (LEFT VENTRICULAR) MURAL THROMBUS: ICD-10-CM

## 2025-03-19 DIAGNOSIS — R06.09 DOE (DYSPNEA ON EXERTION): ICD-10-CM

## 2025-03-19 DIAGNOSIS — I50.42 CHRONIC COMBINED SYSTOLIC AND DIASTOLIC CHF (CONGESTIVE HEART FAILURE) (CMS/HCC): Primary | ICD-10-CM

## 2025-03-19 DIAGNOSIS — I50.42 CHRONIC COMBINED SYSTOLIC AND DIASTOLIC CHF (CONGESTIVE HEART FAILURE) (CMS/HCC): ICD-10-CM

## 2025-03-19 DIAGNOSIS — E78.00 HYPERCHOLESTEROLEMIA: ICD-10-CM

## 2025-03-19 DIAGNOSIS — I63.9 CEREBROVASCULAR ACCIDENT (CVA), UNSPECIFIED MECHANISM (CMS/HCC): ICD-10-CM

## 2025-03-19 DIAGNOSIS — I25.10 CORONARY ARTERY DISEASE INVOLVING NATIVE CORONARY ARTERY OF NATIVE HEART WITHOUT ANGINA PECTORIS: ICD-10-CM

## 2025-03-19 DIAGNOSIS — R07.9 CHEST PAIN, UNSPECIFIED TYPE: ICD-10-CM

## 2025-03-19 DIAGNOSIS — I44.1 MOBITZ TYPE 2 SECOND DEGREE ATRIOVENTRICULAR BLOCK: ICD-10-CM

## 2025-03-19 DIAGNOSIS — E78.5 DYSLIPIDEMIA: ICD-10-CM

## 2025-03-19 DIAGNOSIS — I25.5 ISCHEMIC CARDIOMYOPATHY: ICD-10-CM

## 2025-03-19 DIAGNOSIS — R00.2 PALPITATIONS: ICD-10-CM

## 2025-03-19 DIAGNOSIS — Z95.0 CARDIAC PACEMAKER: ICD-10-CM

## 2025-03-19 DIAGNOSIS — Z86.73 HISTORY OF CARDIOEMBOLIC STROKE: ICD-10-CM

## 2025-03-19 LAB
AORTIC ROOT ANNULUS - M-MODE: 3 CM
AORTIC VALVE MEAN VELOCITY: 1.2 M/S
AORTIC VALVE VELOCITY TIME INTEGRAL: 41.3 CM
AV MEAN GRADIENT: 7 MMHG
AV PEAK GRADIENT: 13 MMHG
AV PEAK VELOCITY-S: 1.81 M/S
AV VALVE AREA INDEX: 0.95
AV VALVE AREA: 1.08 CM2
AV VELOCITY RATIO: 0.47
AVA (VTI): 1.47 CM2
BSA FOR ECHO PROCEDURE: 1.54 M2
CUSP SEPARATION: 1.3 CM
DOP CALC LVOT STROKE VOLUME: 60.6 CM3
E WAVE DECELERATION TIME: 215 MS
E/A RATIO: 0.9
E/E' RATIO: 12.3
E/LAT E' RATIO: 6
EDV (BP): 73.3 CM3
EF (A4C): 60.3 %
EF A2C: 52.2 %
EJECTION FRACTION: 56.8 %
EST RIGHT VENT SYSTOLIC PRESSURE BY TRICUSPID REGURGITATION JET: 21 MMHG
ESV (BP): 31.7 CM3
FRACTIONAL SHORTENING: 41.59 %
HEART RATE: 60 BPM
INTERVENTRICULAR SEPTUM: 0.76 CM
LA ESV INDEX (A2C): 23.51 CM3/M2
LAAS-AP2: 13 CM2
LAAS-AP4: 15.9 CM2
LAD 2D: 4.4 CM
LALD A4C: 3.74 CM
LALD A4C: 4.72 CM
LAV-S: 36.2 CM3
LEFT ATRIUM VOLUME INDEX: 27.79 CM3/M2
LEFT ATRIUM VOLUME: 42.8 CM3
LEFT INTERNAL DIMENSION IN SYSTOLE: 2.57 CM (ref 2.29–3.46)
LEFT VENTRICLE DIASTOLIC VOLUME INDEX: 50.65 CM3/M2
LEFT VENTRICLE DIASTOLIC VOLUME: 78 CM3
LEFT VENTRICLE SYSTOLIC VOLUME INDEX: 20.06 CM3/M2
LEFT VENTRICLE SYSTOLIC VOLUME: 30.9 CM3
LEFT VENTRICULAR INTERNAL DIMENSION IN DIASTOLE: 4.4 CM (ref 3.85–5.35)
LEFT VENTRICULAR POSTERIOR WALL IN END DIASTOLE: 0.77 CM (ref 0.5–0.92)
LV DIASTOLIC VOLUME: 69.1 CM3
LV ESV (APICAL 2 CHAMBER): 33.1 CM3
LVAD-AP2: 27.7 CM2
LVAD-AP4: 29.3 CM2
LVAS-AP2: 17.9 CM2
LVAS-AP4: 17.1 CM2
LVEDVI(A2C): 44.87 CM3/M2
LVEDVI(BP): 47.6 CM3/M2
LVESVI(A2C): 21.49 CM3/M2
LVESVI(BP): 20.58 CM3/M2
LVLD-AP2: 8.97 CM
LVLD-AP4: 9.08 CM
LVLS-AP2: 7.68 CM
LVLS-AP4: 7.65 CM
LVOT 2D: 2 CM
LVOT A: 3.14 CM2
LVOT MG: 1 MMHG
LVOT MV: 0.53 M/S
LVOT PEAK VELOCITY: 0.79 M/S
LVOT PG: 3 MMHG
LVOT STROKE VOLUME INDEX: 39.35 ML/M2
LVOT VTI: 19.3 CM
MLH CV ECHO AVA INDEX VELOCITY RATIO: 0.7
MV E'TISSUE VEL-LAT: 0.08 M/S
MV E'TISSUE VEL-MED: 0.04 M/S
MV PEAK A VEL: 0.51 M/S
MV PEAK E VEL: 0.47 M/S
MV STENOSIS PRESSURE HALF TIME: 63 MS
MV VALVE AREA P 1/2 METHOD: 3.49 CM2
POSTERIOR WALL: 0.77 CM
RAP: 3 MMHG
SEPTAL TISSUE DOPPLER FREE WALL LATE DIA VELOCITY (APICAL 4 CHAMBER VIEW): 0.1 M/S
TAPSE: 1.97 CM
TR MAX PG: 18.32 MMHG
TRICUSPID VALVE PEAK REGURGITATION VELOCITY: 2.14 M/S
Z-SCORE OF LEFT VENTRICULAR DIMENSION IN END DIASTOLE: -0.31
Z-SCORE OF LEFT VENTRICULAR DIMENSION IN END SYSTOLE: -0.72
Z-SCORE OF LEFT VENTRICULAR POSTERIOR WALL IN END DIASTOLE: 0.68

## 2025-03-19 PROCEDURE — 93306 TTE W/DOPPLER COMPLETE: CPT | Mod: 26 | Performed by: INTERNAL MEDICINE

## 2025-03-19 PROCEDURE — G8754 DIAS BP LESS 90: HCPCS | Performed by: NURSE PRACTITIONER

## 2025-03-19 PROCEDURE — 25500000 HC DRUGS/INCIDENT RAD: Mod: JZ | Performed by: INTERNAL MEDICINE

## 2025-03-19 PROCEDURE — 99215 OFFICE O/P EST HI 40 MIN: CPT | Performed by: NURSE PRACTITIONER

## 2025-03-19 PROCEDURE — 93000 ELECTROCARDIOGRAM COMPLETE: CPT | Performed by: NURSE PRACTITIONER

## 2025-03-19 PROCEDURE — 25000000 HC PHARMACY GENERAL: Performed by: INTERNAL MEDICINE

## 2025-03-19 PROCEDURE — G8753 SYS BP > OR = 140: HCPCS | Performed by: NURSE PRACTITIONER

## 2025-03-19 PROCEDURE — C8929 TTE W OR WO FOL WCON,DOPPLER: HCPCS

## 2025-03-19 RX ORDER — FUROSEMIDE 20 MG/1
20 TABLET ORAL EVERY OTHER DAY
Qty: 45 TABLET | Refills: 1 | Status: SHIPPED | OUTPATIENT
Start: 2025-03-19 | End: 2025-09-15

## 2025-03-19 RX ADMIN — PERFLUTREN: 6.52 INJECTION, SUSPENSION INTRAVENOUS at 11:43

## 2025-03-19 NOTE — PROGRESS NOTES
Cardiology Note     3/20/2025            HPI   Jennifer Sams is a 80 y.o. woman who presents for follow up for dilated cardiomyopathy, today, 3/19/2025.  She is here in follow-up from her ER visits and for symptoms of shortness of breath, palpitations, chest pain.  She is accompanied by her friend.  She last saw Dr. Oglesby in 2024.      As you know, she has a history of a mild ischemic cardiomyopathy with a very recent reassuring left and right heart cath, heart block with pacemaker placed 2019 and CAD who has had significant dyspnea on exertion. She was evaluated at the [facility], then here by Dr. Tidwell from a pulmonary standpoint but a pulmonary cause was not found and PFTs are normal. Dr. Carolina previously made pacemaker setting adjustments with regards to AV delay which helped somewhat initially but not completely. She previously followed with Dr. Courtney who retired, and now Dr. Moncada who did a left and right heart cath in February of 2021 found low-normal filling pressures and no residual severe CAD.  Unfortunately, she suffered a large stroke in June 2023.  An echocardiogram at that time showed an LV apical thrombus and she has been on systemic anticoagulation.  She has made good progress but left with some aphasia and right-sided weakness.    Since her last visit in October 2024,   she has had a couple of ER visits for dizziness, lightheadedness, confusion in February 2025.  Her initial visit to the ER was concerning for UTI and she was treated with antibiotics.  She did not feel better and was seen in the ER again on February 20, 2025 and treated for pneumonia.  Patient does have difficulty expressing herself due to aphasia.  Her friend does report she seems to perk up a little bit since being treated for the pneumonia but still having issues and not feeling right.  Patient does report that she had an episode in February where she was up at night cleaning and developed chest pain with  exertion along with shortness of breath.  She has been intermittently experiencing palpitations.  Denies any syncope, abdominal pain, nausea, vomiting, diarrhea, headache.  She does report increased weakness on her right side along with forgetfulness and decline in cognition.  She did see her neurologist yesterday.  He recommended an MRI due to history of CVA and confusion but does feels her confusion is related to her other comorbidities.          Past Medical History:   Diagnosis Date    AAA (abdominal aortic aneurysm) (CMS/Formerly [facility])     Arthritis     Elevated blood pressure reading without diagnosis of hypertension 04/19/2019    Epistaxis 01/06/2020    GERD (gastroesophageal reflux disease) 04/19/2019    Glaucoma 04/19/2019    Heart murmur     Hiatal hernia     History of hip replacement, total, right 04/19/2019    Right hip 9/ 2016 and revision 2017     History of rheumatic fever as a child 04/19/2019    Hypercholesterolemia 04/19/2019    Ischemic cardiomyopathy 05/09/2019    Kidney cysts     MI (myocardial infarction) (CMS/Formerly [facility])     Osteoarthritis of multiple joints 04/19/2019    Osteoporosis     Pacemaker 12/09/2019    Raynaud's disease 04/19/2019    RSD (reflex sympathetic dystrophy) 04/19/2019    Of left foot    SOB (shortness of breath) 12/10/2019    Status post insertion of drug eluting coronary artery stent 05/09/2019    Stroke (CMS/Formerly [facility])      Past Surgical History   Procedure Laterality Date    Cholecystectomy open      Coronary angioplasty with stent placement  04/29/2019    of LAD    Coronary angioplasty with stent placement  04/30/2019    of RCA    Dilation and curettage of uterus      Eye surgery      RIGHT CATARACT    FFR - initial vessel N/A 2/12/2021    Performed by Noé Moncada [place] [facility] [place]    FFR/iFR - initial vessel N/A 11/15/2019    Performed by Noé Moncada MD at [facility] [place] [facility]    Foot surgery Left     Joint replacement Right 09/2016    total hip    Left heart cath with coronary  angiography N/A 11/15/2019    Performed by Noé [place] [facility] [place]    Left heart cath with coronary angiography N/A 2019    Performed by Noé [facility]cada [place] [facility] [place]    PPM - dual chamber new N/A 2019    Performed by Jermaine Carolina MD at [facility] [place] [facility] CATH/EP    Revision total hip arthroplasty Right 2017    RIGHT & LEFT HEART CATH WITH CORONARY ANGIOGRAPHY N/A 2021    Performed by Noé Moncada MD at [facility] [place] [facility] [place]ATH/EP    Stent FRANK coronary - initial vessel N/A 2019    Performed by Noé Moncada MD at [facility] [place]    Stent FRANK coronary - initial vessel N/A 2019    Performed by Noé Moncada MD at [facility] [place] [facility] [place]ATH/EP    Stent FRANK coronary - initial vessel N/A 2019    Performed by Noé Moncada MD at [facility] [place] [place]    Tonsillectomy         SOCIAL HISTORY  Social History     Tobacco Use    Smoking status: Never    Smokeless tobacco: Never   Vaping Use    Vaping status: Never Used   Substance Use Topics    Alcohol use: Not Currently    Drug use: No     Family Status   Relation Name Status    Bio Mother   at age 66    Bio Father   at age 77        pulmonary fibrosis    MGM      MGF      PGM   at age 90        natural causes    PGF     No partnership data on file        ALLERGIES  Prednisone, Ace inhibitors, Atorvastatin, Brilinta [ticagrelor], Codeine, Dilaudid [hydromorphone], Morphine sulfate, Onion, and Oxycodone      Outpatient Encounter Medications as of 3/19/2025:     furosemide (LASIX) 20 mg tablet, Take 1 tablet (20 mg total) by mouth every other day.    pantoprazole (PROTONIX) 40 mg EC tablet, Take 1 tablet (40 mg total) by mouth daily.    apixaban (ELIQUIS) 5 mg tablet, Take 1 tablet (5 mg total) by mouth 2 (two) times a day Indications: treatment to prevent blood clots in chronic atrial fibrillation.    rosuvastatin (CRESTOR) 20 mg tablet, TAKE 1 TABLET BY MOUTH EVERY DAY    []  "perflutren lipid microsphere (DEFINITY) 1.43 mg in sodium chloride 0.9 % 8.7 mL injection,     ROS   As per HPI and otherwise negative.  Otherwise all relevant systems are reviewed and are negative.    Objective   Visit Vitals  BP (!) 142/82   Pulse 60   Ht 1.753 m (5' 9\")   Wt 52.2 kg (115 lb)   SpO2 98%   BMI 16.98 kg/m²           Wt Readings from Last 3 Encounters:   03/19/25 49 kg (108 lb)   03/19/25 52.2 kg (115 lb)   02/04/25 49 kg (108 lb)       Physical Exam  Vitals reviewed.   Constitutional:       Appearance: She is well-developed.   HENT:      Head: Normocephalic.   Eyes:      General: No scleral icterus.  Neck:      Vascular: No JVD.      Comments: JVP 11-12  Cardiovascular:      Rate and Rhythm: Normal rate and regular rhythm.      Heart sounds: Normal heart sounds.   Pulmonary:      Breath sounds: Normal breath sounds.   Skin:     General: Skin is warm and dry.   Neurological:      Mental Status: She is alert and oriented to person, place, and time.      Comments: Right sided weakness. Moderate expressive aphasia   Psychiatric:         Judgment: Judgment normal.         Lab Results   Component Value Date    GLUCOSE 95 02/20/2025    CALCIUM 9.7 02/20/2025     02/20/2025    K 4.8 02/20/2025    CO2 24 02/20/2025     02/20/2025    BUN 15 02/20/2025    CREATININE 0.8 02/20/2025     Lab Results   Component Value Date    ALT 19 02/20/2025    AST 35 02/20/2025    ALKPHOS 59 02/20/2025    BILITOT 0.8 02/20/2025     Lab Results   Component Value Date    WBC 5.96 02/20/2025    HGB 15.0 02/20/2025    HCT 45.0 02/20/2025    MCV 94.1 02/20/2025     (L) 02/20/2025     Lab Results   Component Value Date    TSH 2.36 02/04/2025     Lab Results   Component Value Date    CHOL 178 06/26/2023    CHOL 163 03/31/2023    CHOL 156 02/04/2022     Lab Results   Component Value Date    HDL 71 06/26/2023    HDL 80 03/31/2023    HDL 71 02/04/2022     Lab Results   Component Value Date    LDLCALC 88 06/26/2023    " "LDLCALC 66 03/31/2023    LDLCALC 66 02/04/2022     Lab Results   Component Value Date    TRIG 93 06/26/2023    TRIG 87 03/31/2023    TRIG 107 02/04/2022     No results found for: \"CHOLHDL\"  Lab Results   Component Value Date    HGBA1C 5.4 06/26/2023     Labs December 27, 2023:              Labs 2/6/24:       EKG    Performed on 3/19/25 and personally reviewed:  AV-paced at 60 bpm       Imaging     Echo 11/29/19:  Technically limited study no gross chamber enlargement  Imaging done just to exclude pericardial effusion Limited echo study  No regional wall motion abnormality preserved ejection fraction 55 to 60%  Aortic sclerosis  Mitral tricuspid valves appear normal  Prominent anterior fat pad no evidence of pericardial effusion or tamponade    CXR 1/17/2020:  No active disease.    PFTs 12/24/20:        LHC/RHC 2/12/21:  1. Normal right and left heart filling pressures (RA 3, PA 20/5 with mean of 11 mmHg, and PCW 6 mm Hg).  2. Patent prior stents in the mid and distal LAD.  3. 50% ostial Cx stenosis, not functionally significant by iFR (0.95). Patent proximal Cx stent.  4. Patent mid and distal RCA stents.    Echo 2/17/21:  Left Ventricle: Normal ventricle size. Normal wall thickness. Mildly decreased systolic function. Estimated EF 45- 50%. No regional wall motion abnormalities. Grade I diastolic dysfunction. Diastolic inflow pattern consistent with impaired relaxation. Normal left atrial pressure.  Aortic Valve: Tricuspid valve. Sclerotic leaflets.  Sinuses of Valsalva normal-sized. Ascending aorta normal-sized.  Mild mitral annular calcification.  Left Atrium: Normal-sized atrium. Normal doppler flow of pulmonary veins.  Right Ventricle: Normal ventricle size. Low normal systolic function. Pacer wire present. Normal wall thickness.  Right Atrium: Normal-sized atrium. Pacer/ICD lead present.  Tricuspid Valve: Normal structure. Mild regurgitation. Estimated RVSP = 25 mmHg. The regurgitation jet is central. Normal " hepatic vein systolic flow.  IVC/SVC: Normal-sized IVC. IVC collapses >50% during inspiration. Normal hepatic vein flow.  Pericardium: Normal structure.     CPT July 2021  CARDIOPULMONARY GAS EXCHANGE:  The test was near-maximal as defined by a respiratory exchange ratio of 1.10(normal >1.10).  The peak VO2 was 14.7 cc/kg/min which is 62% predicted (normal >85%).  The anaerobic threshold was 39% predicted (normal >40%).  The peak minute ventilation was 43L/min yielding a normal breathing reserve of 52% (normal >30%).  The VE/VCO2 slope was 43(normal <34).  Resting spirometry showed an FVC of 98%, FEV1 of 99% consistent with normal spirometry.     CONCLUSIONS:  By respiratory exchange ratio, the test was near maximal thus peak VO2 achieved may slightly underestimate her true exercise capacity.  Peak VO2 achieved was 14.7 cc/kg/min which is only 62% of predicted.  In combination with a low anaerobic threshold there is likely an element of deconditioning.  Her breathing reserve and spirometry suggest a cardiac rather than pulmonary limitation to exercise.  Suggest a regular exercise regimen to potentially increase functional capacity.     TTE 2-9-22  Left Ventricle: Normal ventricle size. Mild concentric left ventricular hypertrophy. Mild-to-moderately decreased systolic function. Estimated EF 40- 45%. LV SVI low at 28 mL/m² per beat. Basal and mid inferolateral akinesis, mid and apical anteroseptal akinesisGrade I diastolic dysfunction. Diastolic inflow pattern consistent with impaired relaxation. Normal left atrial pressure.  Tricuspid aortic valve. Sclerotic aortic valve leaflets.  Aorta: Sinuses of Valsalva normal-sized.  Normal leaflet structure of the mitral valve.  Normal-sized LA. Doppler flow of pulmonary veins not obtained.  Normal-sized RV. Mildly reduced RV systolic function. Pacemaker/ICD lead present in the RV. Normal RV wall thickness. Mid free wall akinetic.  Normal-sized RA. Pacemaker wire present in  the RA.  Tricuspid Valve: Normal structure. Trace regurgitation. Estimated RVSP = 27 mmHg.  Pulmonic valve not well visualized.  IVC collapses <50% during inspiration.  Evidence of a prominent pericardial fat pad.    Embue 3-11-22  1. This is a very technically difficult study.  Patient showed excessive motion during image acquisition, imaged with arms at sides, and diaphragmatic and GI interference.   2. Regadenoson technetium tetrofosmin shows a small, fixed defect in the apical to mid inferior wall, suggestive of tissue attenuation, although scar cannot be excluded. There is no myocardium at risk.   3. EKG is non-diagnostic due to ventricular pacing.  4. Gated SPECT imaging was unable to be obtained.   5. No prior study for comparison. Prior nuclear imaging was non-diagnostic, exercise testing.     Echo 9/13/2022:    Left Ventricle: Normal ventricle size. Normal wall thickness. Mildly decreased systolic function. Estimated EF 45-50%. Basal and mid inferolateral akinesis, mid and apical anteroseptal/septal akinesis.    Aortic Valve: Valve not well seen but likely trileaflet.  Sclerotic leaflets. No regurgitation. No stenosis.    Aorta: Aortic root normal.    Right Ventricle: Ventricle not well visualized but probably normal size and at most mildly dilated. Catheter present. Normal systolic function.    Tricuspid Valve: Mild regurgitation. Estimated RVSP = 26 mmHg.    Pericardium: Small anterior pericardial effusion    Left Atrium: Normal sized atrium.    Right Atrium: Normal sized atrium.    Mitral Valve: No regurgitation.    Pulmonic Valve: Valve not well visualized. No regurgitation.    IVC/SVC: Normal sized inferior vena cava. Inferior vena cava collapses >50% during inspiration.    Compared with February 2022, no significant change.    I personally reviewed results with her in the office today.     Abdominal US 10/18/2022:  Aortic ectasia noted, measuring about 2.8 cm in maximal diameter    Echo  3/28/2023:    Left Ventricle: Normal ventricle size. Normal wall thickness. Mildly decreased systolic function. Estimated EF 45-50%. Basal and mid inferolateral akinesis, mid and apical anteroseptal/septal akinesis.    Aortic Valve: Valve not well seen but likely trileaflet.  Sclerotic leaflets. No regurgitation. No stenosis.    Aorta: Aortic root normal.    Right Ventricle: Ventricle not well visualized but probably normal size and at most mildly dilated. Catheter present. Normal systolic function.    Tricuspid Valve: Mild regurgitation. Estimated RVSP = 26 mmHg.    Pericardium: Small anterior pericardial effusion    Left Atrium: Normal sized atrium.    Right Atrium: Normal sized atrium.    Mitral Valve: No regurgitation.    Pulmonic Valve: Valve not well visualized. No regurgitation.    IVC/SVC: Normal sized inferior vena cava. Inferior vena cava collapses >50% during inspiration.    Compared with February 2022, no significant change.    I personally reviewed results with her in the office today.    Echo 6/27/2023  Left ventricle with normal dimensions and regional contraction abnormalities consistent with CAD in the LAD distribution: Localized apical infarction with aneurysm formation.  Paradoxical septal movement is consistent with ventricular pacing.  There is moderate left ventricular systolic and mild diastolic dysfunction.  LVEF 43%  Left ventricular apical thrombus  Left atrial pressure 14 mmHg  There is a visual suggestion of mild aortic stenosis. (Doppler assessment of the aortic valve is suboptimal)  Mild mitral regurgitation  Normal right ventricle dimensions and systolic function: TAPSE 1.84 cm  Mild tricuspid regurgitation  PASP 34 mmHg  Normal left atrial volume index  No pericardial effusion or vegetations  AS-V pacing  Technically difficult study: No parasternal imaging obtained  This study was performed with Definity contrast to optimize evaluation of regional and global left ventricular  function  No significant change from 3/28/2023 except for current demonstration of left ventricular apical thrombus    TTE 3/2024    Left Ventricle: Normal ventricle size. Normal wall thickness. Probable small apical thrombus. Mildly decreased systolic function. Estimated EF 45%. Basal and mid inferolateral akinesis, mid and apical anteroseptal/septal akinesis.  Abnormal septal motion. Normal diastolic filling pattern for age.    Aortic Valve: Valve not well seen but likely trileaflet.  Sclerotic leaflets. No regurgitation. Mild stenosis. Peak velocity = 1.56 m/s. Mean gradient = 6.00 mmHg. Calculated area by cont eq = 1.23 cm2. Calculated dimensionless index = 0.32.    Aorta: Aortic root normal.    Right Ventricle: Ventricle not well visualized but probably normal size and at most mildly dilated. Pacer wire present. Normal systolic function.    Tricuspid Valve: Mild regurgitation. Estimated RVSP = 24 mmHg.    Pericardium: Evidence of a prominent fat pad. Small anterior pericardial effusion    Left Atrium: Normal sized atrium.    Right Atrium: Normal sized atrium.    Mitral Valve: Mild regurgitation.    Pulmonic Valve: Valve not well visualized. No regurgitation.    IVC/SVC: Normal sized inferior vena cava. Inferior vena cava collapses >50% during inspiration.    Compared with June 2023, no significant change.    CT of chest 2/20/25  IMPRESSION:  1.  Irregular subpleural density in the left lower lobe, posteriorly, measuring  17 mm in greatest diameter. The finding may represent a small peripheral  consolidated infiltrate, such as due to pneumonia, but a neoplastic process  cannot be ruled out and follow-up to complete resolution is indicated.     2. Tubular density in the right upper lobe, extending for 2 cm, probably  representing impacted with focal bronchiectasis. Follow-up is recommended.     3. No bowel obstruction.  Increased stool.    TTE 3/19/25    Left Ventricle: Normal ventricle size. Normal wall  "thickness. Probable small apical thrombus. Mildly decreased systolic function. Estimated EF 40-45%. Basal and mid inferolateral akinesis, mid and apical anteroseptal/septal akinesis. Apical akinesis.  Abnormal septal motion. Normal diastolic filling pattern for age.    Aortic Valve: Valve not well seen but likely trileaflet.  Sclerotic leaflets. No regurgitation. Mild stenosis. Peak velocity = 1.81 m/s. Mean gradient = 7.00 mmHg. Calculated area by cont eq = 1.47 cm2. Calculated dimensionless index = 0.47.    Aorta: Aortic root normal.    Right Ventricle: Normal ventricle size. Pacer wire present. Normal systolic function.    Tricuspid Valve: Mild regurgitation. Estimated RVSP = 21 mmHg.    Pericardium: Evidence of a prominent fat pad. Small anterior pericardial effusion    Left Atrium: Normal sized atrium.    Right Atrium: Normal sized atrium.    Mitral Valve: Mild regurgitation.    Pulmonic Valve: Valve not well visualized. No regurgitation.    IVC/SVC: Mildly dilated IVC collapsing about 50%, suggesting mild elevation of central venous filling pressures.    Compared with March 2024, the IVC now suggest mild volume overload, otherwise no significant change.    I personally reviewed results with her and her friend in the office today.      ASSESSMENT AND PLAN    1. Chronic systolic and diastolic CHF, ACC Stage C, NYHA class 3  Appears mildly overloaded today on exam.  Echo obtained prior to visit and  EF 40 to 45%. Her IVC appears mildly dilated.  Her weight is up about 7 pounds from baseline.  Reviewed with patient and friend that we will start furosemide 20 mg by mouth daily for 3 days then every other day.  She will need to recheck her lab work in a week.    Not currently on GDMT.  Dr. Oglesby prescribed a few times in the past  but she has not started it. She is \"sick\" of all of her meds and her condition.    2. CAD.  non-STEMI 4/26/19, three-vessel CAD; status post FRANK x 2 LAD 4/27/19 + FRANK x 4 RCA 4/29/19 + " FRANK x 1 LCX OM2 5/2/19  She should continue aspirin but she is not. She will continue statin therapy.  Dr. Moncada attempted to keep her on dual antiplatelet therapy given her numerous stents but ultimately could not be tolerated with incessant nosebleeds.  Now on Eliquis since her stroke.      Due to recent episode in February, will obtain Lexiscan.  Order placed    3. Dyslipidemia  She should continue with rosuvastatin therapy.   Last LDL 88.  Dr. Salter have picked his battles in the past of asking her to start Lexapro and Irbesartan which were previously prescribed but she didn't start.    Will need repeat lipid profile    4. Discoloration of toes, concern for peripheral vascular disease  She describes what could be Raynaud's of her feet but does not get it in her hands.  She does have reduced pulses in her left foot.  Symptoms are unchanged. Will continue to monitor.  We previously discussed a vascular assessment but she declined.    5. LV apical thrombus  Found when CVA occurred June 2023. Should stay on lifelong anticoagulation, repeat echo today shows apical thrombus.  Continue Eliquis    6.  Abdominal aortic aneurysm.  2.9 cm on imaging in 2018.  A follow-up ultrasound 2022 showed no significant growth.  No need to continue surveillance as she is even slightly under the minimal cutoff for an aneurysm.     7.  Mobitz 2 AV block status post pacemaker 2019.  She follows with Dr. Carolina and is seeing him later today.  I advised her and her friend to follow-up to see if pacemaker compatible with MRI.    8. CVA  Found to have LV apical thrombus, so likely embolic.  Continue with Eliquis.  Following with neurology.  Due to worsening cognition and increased right-sided weakness, neurology ordered MRI.    9.  Depression and anxiety.  She has been prescribed escitalopram for this by her PCP, but Jennifer and her friend reports she did not want to start it.  We had a long discussion last visit about the benefits of it.   She is worried it will be sedating and I reassured her it is not that type of anxiety medicine.  Long discussion about benefits and we discussed that approximately 50% of stroke victims end up suffering from depression.  I have strongly recommended she start it as recommended by her PCP.  Ultimately she decided not to.    10.  Palpitation  Will check Zio patch    11.  Shortness of breath/chest pain  Does appear mildly overloaded today.  Will trial course of furosemide and see how patient's symptoms resolve.  Advised to monitor weights daily.  Echo essentially unchanged.  Due to significant coronary artery disease, will check stress test as well.    12.  Abnormal CT scan  Advised patient that if the patient needs to follow-up with PCP regarding abnormal CAT scan report in February 2025.  She should have repeat imaging to follow tubular density    13.  Elevated blood pressure  Patient-report and her friend report whitecoat hypertension.  Usually lower.            Sincerely,    ARIAN Lenz  3/19/2025     Dr. Salter old able to review echo findings with patient today.  Will order a Zio patch and Lexiscan.  Started patient on furosemide 20 mg daily x 3 days and then every other day.  Patient advised to monitor weight and symptoms.  Will recheck lab work within a week.  Reviewed records from the ER, PCP, neurology along with imaging and lab work    Sincerely,    Milo DON  3/19/2025  I spent  62 minutes on this date of service performing the following activities: obtaining history, performing examination, entering orders, documenting, preparing for visit, obtaining / reviewing records, providing counseling and education, independently reviewing study/studies, communicating results, and coordinating care.

## 2025-03-19 NOTE — PATIENT INSTRUCTIONS
Please schedule stress test--someone will call her.   Wear your Zio patch for 7 days  Continue furosemide 20 mg for three days then every other day  Weigh your self daily  Please checks labs in one to two weeks  Please follow up with PCP regarding abnormal CT scan

## 2025-03-20 VITALS
BODY MASS INDEX: 17.03 KG/M2 | HEART RATE: 60 BPM | OXYGEN SATURATION: 98 % | SYSTOLIC BLOOD PRESSURE: 142 MMHG | HEIGHT: 69 IN | WEIGHT: 115 LBS | DIASTOLIC BLOOD PRESSURE: 82 MMHG

## 2025-03-25 ENCOUNTER — TELEPHONE (OUTPATIENT)
Dept: CARDIOLOGY | Facility: HOSPITAL | Age: 80
End: 2025-03-25
Payer: MEDICARE

## 2025-03-26 ENCOUNTER — HOSPITAL ENCOUNTER (EMERGENCY)
Facility: HOSPITAL | Age: 80
Discharge: HOME | End: 2025-03-26
Attending: EMERGENCY MEDICINE | Admitting: EMERGENCY MEDICINE
Payer: MEDICARE

## 2025-03-26 ENCOUNTER — APPOINTMENT (EMERGENCY)
Dept: RADIOLOGY | Facility: HOSPITAL | Age: 80
End: 2025-03-26
Payer: MEDICARE

## 2025-03-26 VITALS
SYSTOLIC BLOOD PRESSURE: 130 MMHG | HEART RATE: 81 BPM | RESPIRATION RATE: 18 BRPM | BODY MASS INDEX: 18.36 KG/M2 | DIASTOLIC BLOOD PRESSURE: 68 MMHG | WEIGHT: 117 LBS | TEMPERATURE: 96.9 F | OXYGEN SATURATION: 95 % | HEIGHT: 67 IN

## 2025-03-26 DIAGNOSIS — R11.10 VOMITING AND DIARRHEA: Primary | ICD-10-CM

## 2025-03-26 DIAGNOSIS — R19.7 VOMITING AND DIARRHEA: Primary | ICD-10-CM

## 2025-03-26 LAB
ALBUMIN SERPL-MCNC: 4.6 G/DL (ref 3.5–5.7)
ALP SERPL-CCNC: 64 IU/L (ref 34–125)
ALT SERPL-CCNC: 13 IU/L (ref 7–52)
ANION GAP SERPL CALC-SCNC: 9 MEQ/L (ref 3–15)
AST SERPL-CCNC: 24 IU/L (ref 13–39)
ATRIAL RATE: 82
BASOPHILS # BLD: 0.02 K/UL (ref 0.01–0.1)
BASOPHILS NFR BLD: 0.1 %
BILIRUB SERPL-MCNC: 0.7 MG/DL (ref 0.3–1.2)
BNP SERPL-MCNC: 33 PG/ML
BUN SERPL-MCNC: 19 MG/DL (ref 7–25)
CALCIUM SERPL-MCNC: 9.7 MG/DL (ref 8.6–10.3)
CHLORIDE SERPL-SCNC: 104 MEQ/L (ref 98–107)
CO2 SERPL-SCNC: 26 MEQ/L (ref 21–31)
CREAT SERPL-MCNC: 0.9 MG/DL (ref 0.6–1.2)
DIFFERENTIAL METHOD BLD: ABNORMAL
EGFRCR SERPLBLD CKD-EPI 2021: >60 ML/MIN/1.73M*2
EOSINOPHIL # BLD: 0.03 K/UL (ref 0.04–0.36)
EOSINOPHIL NFR BLD: 0.2 %
ERYTHROCYTE [DISTWIDTH] IN BLOOD BY AUTOMATED COUNT: 12.6 % (ref 11.7–14.4)
GLUCOSE SERPL-MCNC: 157 MG/DL (ref 70–99)
HCT VFR BLD AUTO: 49.2 % (ref 35–45)
HGB BLD-MCNC: 16.1 G/DL (ref 11.8–15.7)
IMM GRANULOCYTES # BLD AUTO: 0.06 K/UL (ref 0–0.08)
IMM GRANULOCYTES NFR BLD AUTO: 0.4 %
LIPASE SERPL-CCNC: 56 U/L (ref 11–82)
LYMPHOCYTES # BLD: 0.79 K/UL (ref 1.2–3.5)
LYMPHOCYTES NFR BLD: 5.8 %
MAGNESIUM SERPL-MCNC: 2 MG/DL (ref 1.8–2.5)
MCH RBC QN AUTO: 31.3 PG (ref 28–33.2)
MCHC RBC AUTO-ENTMCNC: 32.7 G/DL (ref 32.2–35.5)
MCV RBC AUTO: 95.7 FL (ref 83–98)
MONOCYTES # BLD: 0.77 K/UL (ref 0.28–0.8)
MONOCYTES NFR BLD: 5.6 %
NEUTROPHILS # BLD: 12.06 K/UL (ref 1.7–7)
NEUTS SEG NFR BLD: 87.9 %
NRBC BLD-RTO: 0 %
P AXIS: 54
PLATELET # BLD AUTO: 184 K/UL (ref 150–369)
PMV BLD AUTO: 9.1 FL (ref 9.4–12.3)
POTASSIUM SERPL-SCNC: 3.7 MEQ/L (ref 3.5–5.1)
PR INTERVAL: 160
PROT SERPL-MCNC: 7.5 G/DL (ref 6–8.2)
QRS DURATION: 186
QT INTERVAL: 474
QTC CALCULATION(BAZETT): 553
R AXIS: -64
RBC # BLD AUTO: 5.14 M/UL (ref 3.93–5.22)
SODIUM SERPL-SCNC: 139 MEQ/L (ref 136–145)
T WAVE AXIS: 101
TROPONIN I SERPL HS-MCNC: 3.3 PG/ML
VENTRICULAR RATE: 82
WBC # BLD AUTO: 13.73 K/UL (ref 3.8–10.5)

## 2025-03-26 PROCEDURE — 3E033GC INTRODUCTION OF OTHER THERAPEUTIC SUBSTANCE INTO PERIPHERAL VEIN, PERCUTANEOUS APPROACH: ICD-10-PCS | Performed by: EMERGENCY MEDICINE

## 2025-03-26 PROCEDURE — 96361 HYDRATE IV INFUSION ADD-ON: CPT

## 2025-03-26 PROCEDURE — 83735 ASSAY OF MAGNESIUM: CPT | Performed by: PHYSICIAN ASSISTANT

## 2025-03-26 PROCEDURE — 96375 TX/PRO/DX INJ NEW DRUG ADDON: CPT

## 2025-03-26 PROCEDURE — 96374 THER/PROPH/DIAG INJ IV PUSH: CPT | Mod: 59

## 2025-03-26 PROCEDURE — 83880 ASSAY OF NATRIURETIC PEPTIDE: CPT | Performed by: PHYSICIAN ASSISTANT

## 2025-03-26 PROCEDURE — 25000000 HC PHARMACY GENERAL: Performed by: PHYSICIAN ASSISTANT

## 2025-03-26 PROCEDURE — 25800000 HC PHARMACY IV SOLUTIONS: Performed by: PHYSICIAN ASSISTANT

## 2025-03-26 PROCEDURE — 63600000 HC DRUGS/DETAIL CODE: Mod: JZ | Performed by: PHYSICIAN ASSISTANT

## 2025-03-26 PROCEDURE — 96376 TX/PRO/DX INJ SAME DRUG ADON: CPT

## 2025-03-26 PROCEDURE — 84484 ASSAY OF TROPONIN QUANT: CPT | Performed by: PHYSICIAN ASSISTANT

## 2025-03-26 PROCEDURE — 85025 COMPLETE CBC W/AUTO DIFF WBC: CPT | Performed by: EMERGENCY MEDICINE

## 2025-03-26 PROCEDURE — 93010 ELECTROCARDIOGRAM REPORT: CPT | Performed by: STUDENT IN AN ORGANIZED HEALTH CARE EDUCATION/TRAINING PROGRAM

## 2025-03-26 PROCEDURE — 93005 ELECTROCARDIOGRAM TRACING: CPT | Performed by: PHYSICIAN ASSISTANT

## 2025-03-26 PROCEDURE — 71045 X-RAY EXAM CHEST 1 VIEW: CPT

## 2025-03-26 PROCEDURE — 83690 ASSAY OF LIPASE: CPT | Performed by: PHYSICIAN ASSISTANT

## 2025-03-26 PROCEDURE — 3E0337Z INTRODUCTION OF ELECTROLYTIC AND WATER BALANCE SUBSTANCE INTO PERIPHERAL VEIN, PERCUTANEOUS APPROACH: ICD-10-PCS | Performed by: EMERGENCY MEDICINE

## 2025-03-26 PROCEDURE — 99284 EMERGENCY DEPT VISIT MOD MDM: CPT | Mod: 25

## 2025-03-26 PROCEDURE — 74177 CT ABD & PELVIS W/CONTRAST: CPT

## 2025-03-26 PROCEDURE — 80053 COMPREHEN METABOLIC PANEL: CPT | Performed by: EMERGENCY MEDICINE

## 2025-03-26 PROCEDURE — 63600105 HC IODINE BASED CONTRAST: Mod: JZ | Performed by: PHYSICIAN ASSISTANT

## 2025-03-26 PROCEDURE — 36415 COLL VENOUS BLD VENIPUNCTURE: CPT | Performed by: EMERGENCY MEDICINE

## 2025-03-26 RX ORDER — ONDANSETRON 4 MG/1
4 TABLET, ORALLY DISINTEGRATING ORAL EVERY 6 HOURS PRN
Qty: 12 TABLET | Refills: 0 | Status: SHIPPED | OUTPATIENT
Start: 2025-03-26 | End: 2025-03-29

## 2025-03-26 RX ORDER — FAMOTIDINE 10 MG/ML
20 INJECTION, SOLUTION INTRAVENOUS ONCE
Status: COMPLETED | OUTPATIENT
Start: 2025-03-26 | End: 2025-03-26

## 2025-03-26 RX ORDER — ONDANSETRON HYDROCHLORIDE 2 MG/ML
4 INJECTION, SOLUTION INTRAVENOUS ONCE
Status: COMPLETED | OUTPATIENT
Start: 2025-03-26 | End: 2025-03-26

## 2025-03-26 RX ORDER — IOPAMIDOL 755 MG/ML
100 INJECTION, SOLUTION INTRAVASCULAR
Status: COMPLETED | OUTPATIENT
Start: 2025-03-26 | End: 2025-03-26

## 2025-03-26 RX ADMIN — SODIUM CHLORIDE 250 ML: 9 INJECTION, SOLUTION INTRAVENOUS at 07:39

## 2025-03-26 RX ADMIN — ONDANSETRON 4 MG: 2 INJECTION INTRAMUSCULAR; INTRAVENOUS at 07:38

## 2025-03-26 RX ADMIN — FAMOTIDINE 20 MG: 10 INJECTION INTRAVENOUS at 07:38

## 2025-03-26 RX ADMIN — IOPAMIDOL 100 ML: 755 INJECTION, SOLUTION INTRAVENOUS at 08:28

## 2025-03-26 RX ADMIN — ONDANSETRON 4 MG: 2 INJECTION INTRAMUSCULAR; INTRAVENOUS at 11:14

## 2025-03-26 NOTE — ED PROVIDER NOTES
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  She is here with Thelma, a friend who is her POA.  She states that yesterday around dinnertime she did mention that her stomach felt \"off\" and that she was going to eat something light like soup for dinner.  She then developed nausea and was reportedly vomiting and retching throughout the night.  No hematemesis or coffee-ground emesis.  She then had diarrhea beginning this morning and called her friend around 5 AM.  She is denying any associated rectal pain or bleeding, dark or black stools.  She denies any abdominal pain, back or flank pain.  She is denying any chest pain, shortness of breath or any change in her baseline weakness.  She denies dizziness, visual changes, headache or syncope.  Denying any known fever.  She does states she feels very cold here.  Her friend states that for the last few months she is \"just been off.\"  She states she has had a lot of fatigue, some shortness of breath etc. and has been seeing her cardiologist trying to figure out the cause.  They had her on Lasix every other day which she finished yesterday.  She was scheduled for stress test tomorrow.  Her friend states that actually the last few weeks though symptoms seem to have been improving.    Past surgical history significant for cholecystectomy.  She was recently treated for UTI and pneumonia, has been off of antibiotics for approximately 3 weeks.  She is denying any current urinary symptoms including dysuria, hematuria, frequency or urgency.    Past Medical History:   Diagnosis Date    AAA (abdominal aortic aneurysm) (CMS/[facility])     " Arthritis     Elevated blood pressure reading without diagnosis of hypertension 04/19/2019    Epistaxis 01/06/2020    GERD (gastroesophageal reflux disease) 04/19/2019    Glaucoma 04/19/2019    Heart murmur     Hiatal hernia     History of hip replacement, total, right 04/19/2019    Right hip 9/ 2016 and revision 2017     History of rheumatic fever as a child 04/19/2019    Hypercholesterolemia 04/19/2019    Ischemic cardiomyopathy 05/09/2019    Kidney cysts     MI (myocardial infarction) (CMS/[facility])     Osteoarthritis of multiple joints 04/19/2019    Osteoporosis     Pacemaker 12/09/2019    Raynaud's disease 04/19/2019    RSD (reflex sympathetic dystrophy) 04/19/2019    Of left foot    SOB (shortness of breath) 12/10/2019    Status post insertion of drug eluting coronary artery stent 05/09/2019    Stroke (CMS/[facility])          Past Surgical History   Procedure Laterality Date    Cholecystectomy open      Coronary angioplasty with stent placement  04/29/2019    of LAD    Coronary angioplasty with stent placement  04/30/2019    of RCA    Dilation and curettage of uterus      Eye surgery      RIGHT CATARACT    FFR - initial vessel N/A 2/12/2021    Performed by Noé Moncada MD at [facility] CARDIAC CATH/EP    FFR/iFR - initial vessel N/A 11/15/2019    Performed by Noé Moncada MD at [facility] CARDIAC CATH/EP    Foot surgery Left     Joint replacement Right 09/2016    total hip    Left heart cath with coronary angiography N/A 11/15/2019    Performed by Noé Moncada MD at [facility] CARDIAC CATH/EP    Left heart cath with coronary angiography N/A 4/29/2019    Performed by Noé Moncada MD at [facility] CARDIAC CATH/EP    PPM - dual chamber new N/A 11/18/2019    Performed by Jermaine Carolina MD at [facility] CARDIAC CATH/EP    Revision total hip arthroplasty Right 2017    RIGHT & LEFT HEART CATH WITH CORONARY ANGIOGRAPHY N/A 2/12/2021    Performed by Noé Moncada MD at [facility] CARDIAC CATH/EP    Stent FRANK coronary - initial vessel N/A 5/2/2019    Performed by  "Noé Moncada MD at [facility] CARDIAC CATH/EP    Stent FRANK coronary - initial vessel N/A 4/30/2019    Performed by Noé Moncada MD at [facility] CARDIAC CATH/EP    Stent FRANK coronary - initial vessel N/A 4/29/2019    Performed by Noé Moncada MD at [facility] CARDIAC CATH/EP    Tonsillectomy         Family History   Problem Relation Name Age of Onset    Congenital heart disease Biological Mother          noted at autopsy    Pulmonary fibrosis Biological Father      Heart attack Paternal Grandfather         Social History     Tobacco Use    Smoking status: Never    Smokeless tobacco: Never   Vaping Use    Vaping status: Never Used   Substance Use Topics    Alcohol use: Not Currently    Drug use: No       Systems Reviewed from Nursing Triage:               Review of Systems         ED Triage Vitals [03/26/25 0650]   Temp Heart Rate Resp BP SpO2   (!) 36.1 °C (96.9 °F) 88 17 (!) 141/76 97 %      Temp Source Heart Rate Source Patient Position BP Location FiO2 (%) (Set)   Temporal Monitor Sitting Right upper arm --       Vitals:    03/26/25 0650 03/26/25 0730 03/26/25 0850 03/26/25 1100   BP: (!) 141/76 128/70 (!) 116/56 130/68   BP Location: Right upper arm      Patient Position: Sitting      Pulse: 88 81 77 81   Resp: 17 (!) 30 20 18   Temp: (!) 36.1 °C (96.9 °F)      TempSrc: Temporal      SpO2: 97% 94% 98% 95%   Weight: 53.1 kg (117 lb)      Height: 1.702 m (5' 7\")                            Physical Exam  Constitutional:       General: She is not in acute distress.     Appearance: Normal appearance. She is not toxic-appearing.   HENT:      Head: Normocephalic.      Mouth/Throat:      Mouth: Mucous membranes are moist.      Pharynx: Oropharynx is clear.   Eyes:      Conjunctiva/sclera: Conjunctivae normal.      Pupils: Pupils are equal, round, and reactive to light.   Cardiovascular:      Rate and Rhythm: Normal rate.      Pulses: Normal pulses.   Pulmonary:      Effort: Pulmonary effort is normal. No respiratory distress.      Breath " sounds: Normal breath sounds.   Abdominal:      Palpations: Abdomen is soft.      Tenderness: There is no abdominal tenderness. There is no guarding.      Comments: No reproducible tenderness to palpation, abdomen nonrigid   Musculoskeletal:         General: No swelling or deformity.      Cervical back: Neck supple. No rigidity or tenderness.   Skin:     General: Skin is warm.      Capillary Refill: Capillary refill takes less than 2 seconds.   Neurological:      Mental Status: She is alert and oriented to person, place, and time. Mental status is at baseline.   Psychiatric:         Behavior: Behavior normal.              X-RAY CHEST 1 VIEW   Final Result   IMPRESSION:   Stable chronic changes. No evidence for acute cardiopulmonary disease.                        ECG 12 lead         CT ABDOMEN PELVIS WITH IV CONTRAST   Final Result   IMPRESSION:   1. Findings concerning for gastroenteritis.   2. No evidence for bowel obstruction.   3. Common bile duct dilatation likely within normal limits for patient's age and   postcholecystectomy status.   4. Fibrofatty infiltration of the liver.   5. Bilateral renal cysts.   6. Infrarenal abdominal aortic aneurysm.   7. New small focal groundglass opacity in the right middle lobe may represent a   developing infiltrate. Correlate clinically.      STUDY: Axial CT images of the abdomen and pelvis were obtained from the lung   bases to the pubic symphysis. 100 cc of Isovue-370 IV contrast were given.   Images were reviewed in soft tissue, lung and bone windows.      CT DOSE:  One or more dose reduction techniques (e.g. automated exposure   control, adjustment of the mA and/or kV according to the patient size, use of   iterative reconstruction technique) utilized for this examination.      COMMENT:      GI System:   Liver:  Diffuse low-attenuation of the liver consistent with hepatic   steatosis.   Gallbladder and bile ducts:  Gallbladder surgically absent. The common bile   duct  measures 9 mm in diameter but within normal limits for patient's age and   postcholecystectomy status.   Pancreas:  Normal.   Stomach:  Stomach is moderately distended and fluid-filled with mucosal   enhancement.   Small bowel:  Numerous fluid-filled but nondilated bowel loops with mucosal   enhancement.   Large bowel:  Normal  in caliber and appearance.       System:   Adrenals:  Normal.   Kidneys:  Normal in position and sizewith normal nephrograms, no   hydronephrosis. Left upper pole cyst measuring 2.5 x 2.4 cm. Right lower pole   cyst anteriorly measuring 1.3 x 1.0 cm.   Urinary bladder:  Normal.   Uterus and ovaries:  Normal in appearance.      RES System:   Spleen:  Normal in size and appearance.   Lymph nodes:  No mesenteric or retroperitoneal lymphadenopathy.      Other:   Peritoneum:  No free air or fluid, contained fluid collection, or mass.   Aorta and iliac arteries:  Atherosclerotic disease of the abdominal aorta   and iliac arteries. There is infrarenal abdominal aortic aneurysm measuring up   to 3.1 cm in diameter.   Abdominal wall: Normal.   Lower lungs and pleura:  Atelectatic changes in the lung bases including the   lingula. Focal groundglass opacity in the right middle lobe, infiltrate not   entirely excluded. This is new from the examination from one month prior.   Partially imaged cardiac pacemaker lead.   Visualized axial skeleton:  The right hip arthroplasty.          Labs Reviewed   CBC AND DIFF - Abnormal       Result Value    WBC 13.73 (*)     RBC 5.14      Hemoglobin 16.1 (*)     Hematocrit 49.2 (*)     MCV 95.7      MCH 31.3      MCHC 32.7      RDW 12.6      Platelets 184      MPV 9.1 (*)     Differential Type Auto      nRBC 0.0      Immature Granulocytes 0.4      Neutrophils 87.9      Lymphocytes 5.8      Monocytes 5.6      Eosinophils 0.2      Basophils 0.1      Immature Granulocytes, Absolute 0.06      Neutrophils, Absolute 12.06 (*)     Lymphocytes, Absolute 0.79 (*)      Monocytes, Absolute 0.77      Eosinophils, Absolute 0.03 (*)     Basophils, Absolute 0.02     COMPREHENSIVE METABOLIC PANEL - Abnormal    Sodium 139      Potassium 3.7      Chloride 104      CO2 26      BUN 19      Creatinine 0.9      Glucose 157 (*)     Calcium 9.7      AST (SGOT) 24      ALT (SGPT) 13      Alkaline Phosphatase 64      Total Protein 7.5      Albumin 4.6      Bilirubin, Total 0.7      eGFR >60.0      Anion Gap 9     MAGNESIUM - Normal    Magnesium 2.0     LIPASE - Normal    Lipase 56     B-TYPE NATRIURETIC PEPTIDE - Normal    BNP 33     HIGH SENSITIVE TROPONIN I (BASELINE - REFLEX 2HR) - Normal    High Sens Troponin I 3.3     C. DIFFICILE BY PCR       X-RAY CHEST 1 VIEW   Final Result   IMPRESSION:   Stable chronic changes. No evidence for acute cardiopulmonary disease.                        ECG 12 lead         CT ABDOMEN PELVIS WITH IV CONTRAST   Final Result   IMPRESSION:   1. Findings concerning for gastroenteritis.   2. No evidence for bowel obstruction.   3. Common bile duct dilatation likely within normal limits for patient's age and   postcholecystectomy status.   4. Fibrofatty infiltration of the liver.   5. Bilateral renal cysts.   6. Infrarenal abdominal aortic aneurysm.   7. New small focal groundglass opacity in the right middle lobe may represent a   developing infiltrate. Correlate clinically.      STUDY: Axial CT images of the abdomen and pelvis were obtained from the lung   bases to the pubic symphysis. 100 cc of Isovue-370 IV contrast were given.   Images were reviewed in soft tissue, lung and bone windows.      CT DOSE:  One or more dose reduction techniques (e.g. automated exposure   control, adjustment of the mA and/or kV according to the patient size, use of   iterative reconstruction technique) utilized for this examination.      COMMENT:      GI System:   Liver:  Diffuse low-attenuation of the liver consistent with hepatic   steatosis.   Gallbladder and bile ducts:   Gallbladder surgically absent. The common bile   duct measures 9 mm in diameter but within normal limits for patient's age and   postcholecystectomy status.   Pancreas:  Normal.   Stomach:  Stomach is moderately distended and fluid-filled with mucosal   enhancement.   Small bowel:  Numerous fluid-filled but nondilated bowel loops with mucosal   enhancement.   Large bowel:  Normal  in caliber and appearance.       System:   Adrenals:  Normal.   Kidneys:  Normal in position and sizewith normal nephrograms, no   hydronephrosis. Left upper pole cyst measuring 2.5 x 2.4 cm. Right lower pole   cyst anteriorly measuring 1.3 x 1.0 cm.   Urinary bladder:  Normal.   Uterus and ovaries:  Normal in appearance.      RES System:   Spleen:  Normal in size and appearance.   Lymph nodes:  No mesenteric or retroperitoneal lymphadenopathy.      Other:   Peritoneum:  No free air or fluid, contained fluid collection, or mass.   Aorta and iliac arteries:  Atherosclerotic disease of the abdominal aorta   and iliac arteries. There is infrarenal abdominal aortic aneurysm measuring up   to 3.1 cm in diameter.   Abdominal wall: Normal.   Lower lungs and pleura:  Atelectatic changes in the lung bases including the   lingula. Focal groundglass opacity in the right middle lobe, infiltrate not   entirely excluded. This is new from the examination from one month prior.   Partially imaged cardiac pacemaker lead.   Visualized axial skeleton:  The right hip arthroplasty.            Procedures    Final diagnoses:   [R11.10, R19.7] Vomiting and diarrhea       ED Course & MDM     ED Course as of 03/26/25 1652   Wed Mar 26, 2025   0736 Magnesium: 2.0  Within normal limits [ES]   0737 Lipase: 56  Within normal limits [ES]   0737 WBC(!): 13.73  Mildly elevated, CT abdomen and pelvis and chest x-ray pending [ES]   0737 Sodium: 139 [ES]   0737 Potassium, Bld: 3.7 [ES]   0737 eGFR: >60.0 [ES]   0737 AST (SGOT): 24 [ES]   0737 ALT (SGPT): 13 [ES]   0737  Bilirubin, Total: 0.7 [ES]   0737 Anion Gap: 9 [ES]   0823 BNP: 33 [ES]   0931 High Sens Troponin I: 3.3 [ES]      ED Course User Index  [ES] Columba Alba PA C           Medical Decision Making  Amount and/or Complexity of Data Reviewed  Independent Historian: friend     Details: POA Thelma  Labs: ordered. Decision-making details documented in ED Course.  Radiology: ordered. Decision-making details documented in ED Course.  ECG/medicine tests: ordered. Decision-making details documented in ED Course.  Discussion of management or test interpretation with external provider(s): Case discussed and managed with ER attending physician.    Risk  Prescription drug management.      EKG reviewed by attending physician, atrial sensed ventricular paced rhythm.    4:52 PM      Impression/Medical Decision Makin-year-old female with history above presenting for evaluation of nausea, vomiting and diarrhea beginning last night, was recently treated for UTI and pneumonia completed antibiotics 3 weeks ago, was also recently treated with a course of Lasix, has been working with cardiologist at [facility] due to increased fatigue and shortness of breath that has been ongoing for the last few months    Plan: ECG, labs including lipase, magnesium, troponin, UA, will check BNP to assess fluid status, suspect patient will need some IV fluid given her vomiting and diarrhea throughout the night, chest x-ray, CT abdomen and pelvis, Zofran, Pepcid      See ED course.  Labs reassuring.  CT consistent with gastroenteritis.  Patient given p.o. trial, she has not had any vomiting here and was able to keep down fluids.  She continues to deny any abdominal pain.  CT did see small area of opacity to the right middle lung, she is not having any cough or sputum production or other URI-like symptoms.  Given her current diarrhea do not feel starting antibiotics would be beneficial, discussed with attending who is in agreement, no evidence of  infiltrate on chest x-ray.  Patient was advised to consume a bland diet and advance diet slowly over the next few days, will give Zofran at home as needed.  She was instructed to follow-up with her primary care physician for repeat evaluation within the next 1 to 2 days but return to the emergency department immediately for any new or concerning symptoms including excessive vomiting, weakness, chest pain, shortness of breath, syncope, fever or abdominal pain.  She is medically stable, alert and in no acute distress.  She was unable to provide a stool sample at this time, recommend follow-up with PCP to have this sent if diarrhea continues.  She is comfortable with discharge instructions and all questions were answered.      Vital Signs Review: Vital signs have been reviewed. The oxygen saturation is  SpO2: 97 % which is within normal limits.      RICHMOND Pedraza  3/26/2025      We are in a period of time with increased volumes and decreased capacity.     This document was created using dragon dictation software.  There might be some typographical errors due to this technology.       Columba Alba PA C  03/26/25 8395

## 2025-03-26 NOTE — DISCHARGE INSTRUCTIONS
Follow-up with your primary care physician for repeat evaluation within the next 2 to 3 days.  Eat a clear liquid diet over the next 24 to 48 hours and advance diet slowly with bland foods.  If you develop any new or worsening symptoms including abdominal pain, fever, excessive vomiting, weakness, shortness of breath, chest pain or syncope return to the emergency department.

## 2025-03-27 NOTE — ED ATTESTATION NOTE
Procedures  Physical Exam  Review of Systems  Medical Decision Making  Amount and/or Complexity of Data Reviewed  Labs: ordered. Decision-making details documented in ED Course.  Radiology: ordered.  ECG/medicine tests: ordered.    Risk  Prescription drug management.        3/27/307162:16 AM  I have personally seen and examined the patient. I was involved in the care and medical decision making for this patient.    I reviewed and agree with the PA/NP/Resident's assessment and plan of care, with any exceptions as documented below.    My focused history, examination, assessment, and plan of care of Jennifer Sams is as follows:    The patient presents with abdominal pain and also nausea vomiting and diarrhea.  Patient does have a previous CVA leaving her with some right-sided weakness and some aphasia.  She is able to speak but in 1 or 2 word sentences.  Please see RICHMOND Alba's note for further details.    Exam: Awake alert oriented.  Lungs are clear.  Heart S1-S2 without a murmur.  Abdomen soft and nontender.  Neuroexam is at baseline with a right-sided weakness.    Impression/Plan/Medical Decision Making: Acute nausea vomiting some diarrhea.  Labs here show slightly elevated white count of 13,000.  Comprehensive panel is unremarkable.  CT scan of the abdomen pelvis done with IV contrast.  Shows findings consistent with enteritis.  This was independently reviewed.  EKG on my review is a paced rhythm.  Chest x-ray on my review does not show any infiltrate.  Agree with PA management and discharge.  She is here now  Vital Signs Review: Vital signs have been reviewed. The oxygen saturation is  SpO2: 97 % which is normal.    I was physically present for the key/critical portions of the following procedures: None    This document was created using Dragon dictation software.  There might be some typographical errors due to this technology.    We are in a period of time with increased volumes and decreased capacity.       Francesco Haddad MD  03/27/25 1013

## 2025-04-01 ENCOUNTER — TELEPHONE (OUTPATIENT)
Dept: CARDIOLOGY | Facility: CLINIC | Age: 80
End: 2025-04-01
Payer: MEDICARE

## 2025-04-01 NOTE — TELEPHONE ENCOUNTER
MF- Pt cxld last scheduled Stress Test appt because she was in [place]. Can you reach out to see if she's able to reschedule prior to appt with SD on 4/22? TY

## 2025-04-07 ENCOUNTER — TELEPHONE (OUTPATIENT)
Dept: CARDIOLOGY | Facility: CLINIC | Age: 80
End: 2025-04-07
Payer: MEDICARE

## 2025-04-10 ENCOUNTER — TELEPHONE (OUTPATIENT)
Dept: CARDIOLOGY | Facility: HOSPITAL | Age: 80
End: 2025-04-10
Payer: MEDICARE

## 2025-04-11 LAB
ATRIAL RATE: 60
P AXIS: 27
PR INTERVAL: 178
QRS DURATION: 186
QT INTERVAL: 520
QTC CALCULATION(BAZETT): 520
R AXIS: -46
T WAVE AXIS: 96
VENTRICULAR RATE: 60

## 2025-04-14 ENCOUNTER — HOSPITAL ENCOUNTER (OUTPATIENT)
Dept: CARDIOLOGY | Facility: HOSPITAL | Age: 80
Discharge: HOME | End: 2025-04-14
Attending: NURSE PRACTITIONER
Payer: MEDICARE

## 2025-04-14 VITALS
BODY MASS INDEX: 18.05 KG/M2 | DIASTOLIC BLOOD PRESSURE: 66 MMHG | HEIGHT: 67 IN | OXYGEN SATURATION: 99 % | SYSTOLIC BLOOD PRESSURE: 144 MMHG | WEIGHT: 115 LBS | HEART RATE: 64 BPM | RESPIRATION RATE: 15 BRPM

## 2025-04-14 VITALS — DIASTOLIC BLOOD PRESSURE: 66 MMHG | SYSTOLIC BLOOD PRESSURE: 144 MMHG | HEART RATE: 65 BPM | OXYGEN SATURATION: 99 %

## 2025-04-14 DIAGNOSIS — R07.9 CHEST PAIN, UNSPECIFIED TYPE: ICD-10-CM

## 2025-04-14 LAB
CV NM TETROFOSMIN REST DOSE: 9.8 MCI
CV NM TETROFOSMIN STRESS DOSE: 29.1 MCI
MLH CV NM REST ADMIN DATE: NORMAL MM/DD/YYYY
MLH CV NM REST ADMIN TIME: NORMAL HR:MIN
MLH CV NM STRESS ADMIN DATE: NORMAL MM/DD/YYYY
MLH CV NM STRESS ADMIN TIME: NORMAL HR:MIN
NUC STRESS EJECTION FRACTION: 62 %
STRESS BASELINE BP: NORMAL MMHG
STRESS BASELINE HR: 64 BPM
STRESS O2 SAT REST: 99 %
STRESS PERCENT HR: 71 %
STRESS POST PEAK BP: NORMAL MMHG
STRESS POST PEAK HR: 100 BPM
STRESS TARGET HR: 119 BPM

## 2025-04-14 PROCEDURE — 78452 HT MUSCLE IMAGE SPECT MULT: CPT | Mod: 26 | Performed by: STUDENT IN AN ORGANIZED HEALTH CARE EDUCATION/TRAINING PROGRAM

## 2025-04-14 PROCEDURE — 93017 CV STRESS TEST TRACING ONLY: CPT

## 2025-04-14 PROCEDURE — 93018 CV STRESS TEST I&R ONLY: CPT | Performed by: STUDENT IN AN ORGANIZED HEALTH CARE EDUCATION/TRAINING PROGRAM

## 2025-04-14 PROCEDURE — 63600000 HC DRUGS/DETAIL CODE: Mod: JZ | Performed by: NURSE PRACTITIONER

## 2025-04-14 PROCEDURE — A9502 TC99M TETROFOSMIN: HCPCS | Performed by: NURSE PRACTITIONER

## 2025-04-14 PROCEDURE — 93016 CV STRESS TEST SUPVJ ONLY: CPT | Performed by: INTERNAL MEDICINE

## 2025-04-14 RX ORDER — REGADENOSON 0.08 MG/ML
0.4 INJECTION, SOLUTION INTRAVENOUS ONCE
Status: COMPLETED | OUTPATIENT
Start: 2025-04-14 | End: 2025-04-14

## 2025-04-14 RX ADMIN — AMINOPHYLLINE 125 MG: 25 INJECTION, SOLUTION INTRAVENOUS at 14:13

## 2025-04-14 RX ADMIN — TETROFOSMIN 9.8 MILLICURIE: 1.38 INJECTION, POWDER, LYOPHILIZED, FOR SOLUTION INTRAVENOUS at 13:00

## 2025-04-14 RX ADMIN — REGADENOSON 0.4 MG: 0.08 INJECTION, SOLUTION INTRAVENOUS at 14:10

## 2025-04-14 RX ADMIN — TETROFOSMIN 29.1 MILLICURIE: 1.38 INJECTION, POWDER, LYOPHILIZED, FOR SOLUTION INTRAVENOUS at 14:10

## 2025-04-15 ENCOUNTER — TELEPHONE (OUTPATIENT)
Dept: CARDIOLOGY | Facility: CLINIC | Age: 80
End: 2025-04-15
Payer: MEDICARE

## 2025-04-15 NOTE — TELEPHONE ENCOUNTER
SD/HARDIK/DOMINGO- RAFAELI, TY.     I called the Thelma BECERRIL & informed her that   Dr. Salter reviewed her stress test and it was good, it did not show any evidence of heart blockage.     She verbalized understanding & thanked me.

## 2025-04-15 NOTE — TELEPHONE ENCOUNTER
Hi     Can you please let her know that Dr. Salter reviewed her stress test and it was good, it did not show any evidence of heart blockage.     Thank you!

## 2025-04-21 NOTE — PROGRESS NOTES
Cardiology Note          HPI   Jennifer Sams is a 80 y.o. woman who presents for follow up for dilated cardiomyopathy, today, 4/22/2025.    As you know, she has a history of a mild ischemic cardiomyopathy with a very recent reassuring left and right heart cath, heart block with pacemaker placed 2019 and CAD who has had significant dyspnea on exertion. She was evaluated at the [facility], then here by Dr. Tidwell from a pulmonary standpoint but a pulmonary cause was not found and PFTs are normal. Dr. Carolina previously made pacemaker setting adjustments with regards to AV delay which helped somewhat initially but not completely. She previously followed with Dr. Courtney who retired, and now Dr. Moncada who did a left and right heart cath in February of 2021 found low-normal filling pressures and no residual severe CAD.  Unfortunately, she suffered a large stroke in June 2023.  An echocardiogram at that time showed an LV apical thrombus and she has been on systemic anticoagulation.  She has made good progress but left with some aphasia and right-sided weakness.     At her last visit in March she was seen semiurgently by nurse practitioner Byron due to worsening dyspnea on exertion and confusion.  She had had a prolonged viral illness prior.  Also some palpitations.  We therefore ordered a stress test and ZIO monitor which were reassuring.  We also recommended a trial of furosemide for a few days.  She and her caretaker tell me that she tried daily Lasix for 1 week.  Symptoms did not change and her weight stayed the same at 114 pounds.  She then stopped Lasix and similarly her weight stayed the same and no worsening of symptoms.  In fact she is somewhat improved in all areas.  She is becoming more active again and her dyspnea on exertion is lessening, therefore wonder if this was deconditioning as well as the effects of a prolonged viral illness.  Her confusion has mostly improved as well.  Anticipate more  activity with the improving weather.      Past Medical History:   Diagnosis Date    AAA (abdominal aortic aneurysm) (CMS/[facility])     Arthritis     Elevated blood pressure reading without diagnosis of hypertension 04/19/2019    Epistaxis 01/06/2020    GERD (gastroesophageal reflux disease) 04/19/2019    Glaucoma 04/19/2019    Heart murmur     Hiatal hernia     History of hip replacement, total, right 04/19/2019    Right hip 9/ 2016 and revision 2017     History of rheumatic fever as a child 04/19/2019    Hypercholesterolemia 04/19/2019    Ischemic cardiomyopathy 05/09/2019    Kidney cysts     MI (myocardial infarction) (CMS/[facility])     Osteoarthritis of multiple joints 04/19/2019    Osteoporosis     Pacemaker 12/09/2019    Raynaud's disease 04/19/2019    RSD (reflex sympathetic dystrophy) 04/19/2019    Of left foot    SOB (shortness of breath) 12/10/2019    Status post insertion of drug eluting coronary artery stent 05/09/2019    Stroke (CMS/[facility])      Past Surgical History   Procedure Laterality Date    Cholecystectomy open      Coronary angioplasty with stent placement  04/29/2019    of LAD    Coronary angioplasty with stent placement  04/30/2019    of RCA    Dilation and curettage of uterus      Eye surgery      RIGHT CATARACT    FFR - initial vessel N/A 2/12/2021    [place] [facility] [place]    FFR/iFR - initial vessel N/A 11/15/2019    Performed by No[place] [facility] [place] [facility] [place]    Foot surgery Left     Joint replacement Right 09/2016    total hip    Left heart cath with coronary angiography N/A 11/15/2019    Performed by Noé [place] [facility] [place] [facility] [place]    Left heart cath with coronary angiography N/A 4/29/2019    Performed by No[place] [facility] [place] [facility] [place]    PPM - dual chamber new N/A 11/18/2019    Performed by Jermaine Carolina [place] [facility] [place] [facility] [place]    Revision total hip arthroplasty Right 2017    RIGHT & LEFT HEART CATH WITH CORONARY ANGIOGRAPHY N/A  2021    Performed by Noé [place] [facility] [place]    Stent FRANK coronary - initial vessel N/A 2019    Performed by Noé [place] [facility] [place] [facility]    Stent FRANK coronary - initial vessel N/A 2019    Performed by Noé [place]ada [place] [facility] [place] [facility] [place]    Stent FRANK coronary - initial vessel N/A 2019    Performed by Noé [facility]cada [place] [facility] [place] [facility] [place]    Tonsillectomy         SOCIAL HISTORY  Social History     Tobacco Use    Smoking status: Never    Smokeless tobacco: Never   Vaping Use    Vaping status: Never Used   Substance Use Topics    Alcohol use: Not Currently    Drug use: No     Family Status   Relation Name Status    Bio Mother   at age 66    Bio Father   at age 77        pulmonary fibrosis    MGM      MGF      PGM   at age 90        natural causes    PGF     No partnership data on file        ALLERGIES  Prednisone, Ace inhibitors, Atorvastatin, Brilinta [ticagrelor], Codeine, Dilaudid [hydromorphone], Morphine sulfate, Onion, and Oxycodone      Outpatient Encounter Medications as of 2025:     ondansetron ODT (ZOFRAN-ODT) 4 mg disintegrating tablet, Take 1 tablet (4 mg total) by mouth every 6 (six) hours as needed for nausea or vomiting for up to 3 days. (Patient not taking: Reported on 2025)    furosemide (LASIX) 20 mg tablet, Take 1 tablet (20 mg total) by mouth every other day. (Patient not taking: Reported on 2025)    pantoprazole (PROTONIX) 40 mg EC tablet, Take 1 tablet (40 mg total) by mouth daily.    apixaban (ELIQUIS) 5 mg tablet, Take 1 tablet (5 mg total) by mouth 2 (two) times a day Indications: treatment to prevent blood clots in chronic atrial fibrillation.    rosuvastatin (CRESTOR) 20 mg tablet, TAKE 1 TABLET BY MOUTH EVERY DAY    [] regadenoson (LEXISCAN) injection 0.4 mg,     [] ondansetron (ZOFRAN) injection 4 mg,     [] famotidine (PEPCID) injection 20 mg,      "[] sodium chloride 0.9 % bolus 250 mL,     [] iopamidoL (ISOVUE-370) 370 mg iodine /mL (76 %) injection 100 mL,     [] ondansetron (ZOFRAN) injection 4 mg,     ROS   As per HPI and otherwise negative.  Otherwise all relevant systems are reviewed and are negative.    Objective   Visit Vitals  /66   Pulse 81   Ht 1.727 m (5' 8\")   Wt 50.7 kg (111 lb 11.2 oz)   SpO2 99%   BMI 16.98 kg/m²           Wt Readings from Last 3 Encounters:   25 50.7 kg (111 lb 11.2 oz)   25 51.3 kg (113 lb)   25 52.2 kg (115 lb)       Physical Exam  Vitals reviewed.   Constitutional:       Appearance: She is well-developed.   HENT:      Head: Normocephalic.   Eyes:      General: No scleral icterus.  Neck:      Vascular: No JVD.   Cardiovascular:      Rate and Rhythm: Normal rate and regular rhythm.      Heart sounds: Normal heart sounds.   Pulmonary:      Breath sounds: Normal breath sounds.   Skin:     General: Skin is warm and dry.   Neurological:      Mental Status: She is alert and oriented to person, place, and time.      Comments: Right sided weakness. Moderate expressive aphasia   Psychiatric:         Judgment: Judgment normal.         Lab Results   Component Value Date    GLUCOSE 157 (H) 2025    CALCIUM 9.7 2025     2025    K 3.7 2025    CO2 26 2025     2025    BUN 19 2025    CREATININE 0.9 2025     Lab Results   Component Value Date    ALT 13 2025    AST 24 2025    ALKPHOS 64 2025    BILITOT 0.7 2025     Lab Results   Component Value Date    WBC 13.73 (H) 2025    HGB 16.1 (H) 2025    HCT 49.2 (H) 2025    MCV 95.7 2025     2025     Lab Results   Component Value Date    TSH 2.36 2025     Lab Results   Component Value Date    CHOL 178 2023    CHOL 163 2023    CHOL 156 2022     Lab Results   Component Value Date    HDL 71 2023    HDL 80 " "03/31/2023    HDL 71 02/04/2022     Lab Results   Component Value Date    LDLCALC 88 06/26/2023    LDLCALC 66 03/31/2023    LDLCALC 66 02/04/2022     Lab Results   Component Value Date    TRIG 93 06/26/2023    TRIG 87 03/31/2023    TRIG 107 02/04/2022     No results found for: \"CHOLHDL\"  Lab Results   Component Value Date    HGBA1C 5.4 06/26/2023     Labs December 27, 2023:              Labs 2/6/24:       EKG    Performed on 04/22/24 and personally reviewed:  Sinus with v-pacing at 66bpm      Imaging    Echo 11/29/19:  Technically limited study no gross chamber enlargement  Imaging done just to exclude pericardial effusion Limited echo study  No regional wall motion abnormality preserved ejection fraction 55 to 60%  Aortic sclerosis  Mitral tricuspid valves appear normal  Prominent anterior fat pad no evidence of pericardial effusion or tamponade    CXR 1/17/2020:  No active disease.    PFTs 12/24/20:        LHC/RHC 2/12/21:  1. Normal right and left heart filling pressures (RA 3, PA 20/5 with mean of 11 mmHg, and PCW 6 mm Hg).  2. Patent prior stents in the mid and distal LAD.  3. 50% ostial Cx stenosis, not functionally significant by iFR (0.95). Patent proximal Cx stent.  4. Patent mid and distal RCA stents.    Echo 2/17/21:  Left Ventricle: Normal ventricle size. Normal wall thickness. Mildly decreased systolic function. Estimated EF 45- 50%. No regional wall motion abnormalities. Grade I diastolic dysfunction. Diastolic inflow pattern consistent with impaired relaxation. Normal left atrial pressure.  Aortic Valve: Tricuspid valve. Sclerotic leaflets.  Sinuses of Valsalva normal-sized. Ascending aorta normal-sized.  Mild mitral annular calcification.  Left Atrium: Normal-sized atrium. Normal doppler flow of pulmonary veins.  Right Ventricle: Normal ventricle size. Low normal systolic function. Pacer wire present. Normal wall thickness.  Right Atrium: Normal-sized atrium. Pacer/ICD lead present.  Tricuspid Valve: " Normal structure. Mild regurgitation. Estimated RVSP = 25 mmHg. The regurgitation jet is central. Normal hepatic vein systolic flow.  IVC/SVC: Normal-sized IVC. IVC collapses >50% during inspiration. Normal hepatic vein flow.  Pericardium: Normal structure.     CPT July 2021  CARDIOPULMONARY GAS EXCHANGE:  The test was near-maximal as defined by a respiratory exchange ratio of 1.10(normal >1.10).  The peak VO2 was 14.7 cc/kg/min which is 62% predicted (normal >85%).  The anaerobic threshold was 39% predicted (normal >40%).  The peak minute ventilation was 43L/min yielding a normal breathing reserve of 52% (normal >30%).  The VE/VCO2 slope was 43(normal <34).  Resting spirometry showed an FVC of 98%, FEV1 of 99% consistent with normal spirometry.     CONCLUSIONS:  By respiratory exchange ratio, the test was near maximal thus peak VO2 achieved may slightly underestimate her true exercise capacity.  Peak VO2 achieved was 14.7 cc/kg/min which is only 62% of predicted.  In combination with a low anaerobic threshold there is likely an element of deconditioning.  Her breathing reserve and spirometry suggest a cardiac rather than pulmonary limitation to exercise.  Suggest a regular exercise regimen to potentially increase functional capacity.     TTE 2-9-22  Left Ventricle: Normal ventricle size. Mild concentric left ventricular hypertrophy. Mild-to-moderately decreased systolic function. Estimated EF 40- 45%. LV SVI low at 28 mL/m² per beat. Basal and mid inferolateral akinesis, mid and apical anteroseptal akinesisGrade I diastolic dysfunction. Diastolic inflow pattern consistent with impaired relaxation. Normal left atrial pressure.  Tricuspid aortic valve. Sclerotic aortic valve leaflets.  Aorta: Sinuses of Valsalva normal-sized.  Normal leaflet structure of the mitral valve.  Normal-sized LA. Doppler flow of pulmonary veins not obtained.  Normal-sized RV. Mildly reduced RV systolic function. Pacemaker/ICD lead present  in the RV. Normal RV wall thickness. Mid free wall akinetic.  Normal-sized RA. Pacemaker wire present in the RA.  Tricuspid Valve: Normal structure. Trace regurgitation. Estimated RVSP = 27 mmHg.  Pulmonic valve not well visualized.  IVC collapses <50% during inspiration.  Evidence of a prominent pericardial fat pad.    [employer] 3-11-22  1. This is a very technically difficult study.  Patient showed excessive motion during image acquisition, imaged with arms at sides, and diaphragmatic and GI interference.   2. Regadenoson technetium tetrofosmin shows a small, fixed defect in the apical to mid inferior wall, suggestive of tissue attenuation, although scar cannot be excluded. There is no myocardium at risk.   3. EKG is non-diagnostic due to ventricular pacing.  4. Gated SPECT imaging was unable to be obtained.   5. No prior study for comparison. Prior nuclear imaging was non-diagnostic, exercise testing.     Echo 9/13/2022:    Left Ventricle: Normal ventricle size. Normal wall thickness. Mildly decreased systolic function. Estimated EF 45-50%. Basal and mid inferolateral akinesis, mid and apical anteroseptal/septal akinesis.    Aortic Valve: Valve not well seen but likely trileaflet.  Sclerotic leaflets. No regurgitation. No stenosis.    Aorta: Aortic root normal.    Right Ventricle: Ventricle not well visualized but probably normal size and at most mildly dilated. Catheter present. Normal systolic function.    Tricuspid Valve: Mild regurgitation. Estimated RVSP = 26 mmHg.    Pericardium: Small anterior pericardial effusion    Left Atrium: Normal sized atrium.    Right Atrium: Normal sized atrium.    Mitral Valve: No regurgitation.    Pulmonic Valve: Valve not well visualized. No regurgitation.    IVC/SVC: Normal sized inferior vena cava. Inferior vena cava collapses >50% during inspiration.    Compared with February 2022, no significant change.    I personally reviewed results with her in the office today.      Abdominal US 10/18/2022:  Aortic ectasia noted, measuring about 2.8 cm in maximal diameter    Echo 3/28/2023:    Left Ventricle: Normal ventricle size. Normal wall thickness. Mildly decreased systolic function. Estimated EF 45-50%. Basal and mid inferolateral akinesis, mid and apical anteroseptal/septal akinesis.    Aortic Valve: Valve not well seen but likely trileaflet.  Sclerotic leaflets. No regurgitation. No stenosis.    Aorta: Aortic root normal.    Right Ventricle: Ventricle not well visualized but probably normal size and at most mildly dilated. Catheter present. Normal systolic function.    Tricuspid Valve: Mild regurgitation. Estimated RVSP = 26 mmHg.    Pericardium: Small anterior pericardial effusion    Left Atrium: Normal sized atrium.    Right Atrium: Normal sized atrium.    Mitral Valve: No regurgitation.    Pulmonic Valve: Valve not well visualized. No regurgitation.    IVC/SVC: Normal sized inferior vena cava. Inferior vena cava collapses >50% during inspiration.    Compared with February 2022, no significant change.    I personally reviewed results with her in the office today.    Echo 6/27/2023  Left ventricle with normal dimensions and regional contraction abnormalities consistent with CAD in the LAD distribution: Localized apical infarction with aneurysm formation.  Paradoxical septal movement is consistent with ventricular pacing.  There is moderate left ventricular systolic and mild diastolic dysfunction.  LVEF 43%  Left ventricular apical thrombus  Left atrial pressure 14 mmHg  There is a visual suggestion of mild aortic stenosis. (Doppler assessment of the aortic valve is suboptimal)  Mild mitral regurgitation  Normal right ventricle dimensions and systolic function: TAPSE 1.84 cm  Mild tricuspid regurgitation  PASP 34 mmHg  Normal left atrial volume index  No pericardial effusion or vegetations  AS-V pacing  Technically difficult study: No parasternal imaging obtained  This study was  performed with Definity contrast to optimize evaluation of regional and global left ventricular function  No significant change from 3/28/2023 except for current demonstration of left ventricular apical thrombus    TTE 3/2024    Left Ventricle: Normal ventricle size. Normal wall thickness. Probable small apical thrombus. Mildly decreased systolic function. Estimated EF 45%. Basal and mid inferolateral akinesis, mid and apical anteroseptal/septal akinesis.  Abnormal septal motion. Normal diastolic filling pattern for age.    Aortic Valve: Valve not well seen but likely trileaflet.  Sclerotic leaflets. No regurgitation. Mild stenosis. Peak velocity = 1.56 m/s. Mean gradient = 6.00 mmHg. Calculated area by cont eq = 1.23 cm2. Calculated dimensionless index = 0.32.    Aorta: Aortic root normal.    Right Ventricle: Ventricle not well visualized but probably normal size and at most mildly dilated. Pacer wire present. Normal systolic function.    Tricuspid Valve: Mild regurgitation. Estimated RVSP = 24 mmHg.    Pericardium: Evidence of a prominent fat pad. Small anterior pericardial effusion    Left Atrium: Normal sized atrium.    Right Atrium: Normal sized atrium.    Mitral Valve: Mild regurgitation.    Pulmonic Valve: Valve not well visualized. No regurgitation.    IVC/SVC: Normal sized inferior vena cava. Inferior vena cava collapses >50% during inspiration.    Compared with June 2023, no significant change.      Zio 4/2025      NST 4/2025  1.  This is a very technically difficult study. .   2. Regadenoson technetium tetrofosmin shows a small to moderate, fixed defect in the apical inferior, inferoseptal wall most likely representative of sub diaphragmatic attenuations however cannot rule out scar. There is no myocardium at risk.   3. ECG shows ventricular paced rhythm, there are no ST changes with infusion of Regadenoson.  4. Gated SPECT obtained in the resting state post stress shows left ventricular ejection fraction  "of 62%.  All walls show normal thickening and endocardial excursion. There may be slight hypokinesis in inferior/inferoseptal walls at rest and stress, however, evaluation is limited in the setting of study quality.  5. No chest pain reported with regadenoson infusion. She received aminophylline due to nausea.  6. This is a an equivocal study.   7. Compared to nuclear medicine regadenoson stress test from 2022, this is an equivocal study in the setting of study quality.     ASSESSMENT AND PLAN    1. Chronic systolic and diastolic CHF, ACC Stage C, NYHA class 3  Right heart cath showed low filling pressures, so diuretics were not indicated. Given the dizziness int he past we were avoding anything with a diuretic effect thus the decision to go with valsartan or irbesartan to start.  I prescribed a few times in the past  but she has not started it. She is \"sick\" of all of her meds and her condition.  Last OV she was mildly overloaded on exam.  Echo obtained prior to visit and  EF 40 to 45%. Her IVC appears mildly dilated.  Her weight was also up about 7 pounds from baseline.  A week of furosemide did not change her weight or symptoms.  She then has been off furosemide for 3 weeks with again her weight staying the same and her symptoms actually improved likely unrelated.  She therefore likely gained back 6 pounds of regular weight after losing a lot of weight over the last few years.  She examines euvolemic today.  She will stay off of Lasix for now.  Dry weight appears to be about 114 pounds currently.       2. CAD.  non-STEMI 4/26/19, three-vessel CAD; status post FRANK x 2 LAD 4/27/19 + FRANK x 4 RCA 4/29/19 + FRANK x 1 LCX OM2 5/2/19  She should continue aspirin but she is not. She will continue statin therapy.  Dr. Moncada attempted to keep her on dual antiplatelet therapy given her numerous stents but ultimately could not be tolerated with incessant nosebleeds.  Now on Eliquis since her stroke.  Due to recent episode in " February, we ordered a Lexiscan, which was negative.     3. Dyslipidemia  She should continue with rosuvastatin therapy.   Last LDL 88.  I have picked my battles in the past of asking her to start Lexapro and Irbesartan which were previously prescribed but she didn't start.    4. Discoloration of toes, concern for peripheral vascular disease  She describes what could be Raynaud's of her feet but does not get it in her hands.  She does have reduced pulses in her left foot.  Symptoms are unchanged. Will continue to monitor.  We previously discussed a vascular assessment but she declined.    5. LV apical thrombus  Found when CVA occurred June 2023. Should stay on lifelong anticoagulation, continue Eliquis,.     6.  Abdominal aortic aneurysm.  2.9 cm on imaging in 2018.  A follow-up ultrasound 2022 showed no significant growth.  No need to continue surveillance as she is even slightly under the minimal cutoff for an aneurysm.     7.  Mobitz 2 AV block status post pacemaker 2019.  She follows with Dr. Carolina     8. CVA  Found to have LV apical thrombus, so likely embolic. Should stay on Eliquis. Following with neurology, recent worsening cognition and increased right-sided weakness,     9.  Depression and anxiety.  She has been prescribed escitalopram for this by her PCP, but Jennifer and her friend reports she did not want to start it.  We had a long discussion last visit about the benefits of it.  She is worried it will be sedating and I reassured her it is not that type of anxiety medicine.  Long discussion about benefits and we discussed that approximately 50% of stroke victims end up suffering from depression.  I have strongly recommended she start it as recommended by her PCP.  Ultimately she decided not to.    10.  Palpitation  Benign Zio 4/2025     11.  Shortness of breath/chest pain  Lasix made no difference.  Ultimately it seems her breathing gradually improved with getting more active again.  It is therefore  felt her worsening dyspnea was deconditioning as well as getting over a prolonged viral illness.     12.  Abnormal CT scan  Advised patient that if the patient needs to follow-up with PCP regarding abnormal CAT scan report in February 2025.  She should have repeat imaging to follow tubular density     13.  Elevated blood pressure  Elevated last office visit.  Patient-report and her friend report whitecoat hypertension.  Good in the office today.        I attest that this visit supports the complexity inherent to evaluation and management associated with medical care services that serve as the continuing focal point for all needed health care services and/or medical care services that are part of ongoing care related to this patient's single, serious condition or a complex condition.    Thank you for allow me to participate in the care of this patient.  I reviewed interim records from EP as well as nuclear stress test and ER visit.  I will plan to see her back in 6 to 8 months for routine follow-up but she knows to contact me if any questions or concerns in the interim.    Sincerely,    Jermaine Salter MD  4/22/2025

## 2025-04-22 ENCOUNTER — TELEPHONE (OUTPATIENT)
Dept: CARDIOLOGY | Facility: CLINIC | Age: 80
End: 2025-04-22

## 2025-04-22 ENCOUNTER — OFFICE VISIT (OUTPATIENT)
Dept: CARDIOLOGY | Facility: CLINIC | Age: 80
End: 2025-04-22
Payer: MEDICARE

## 2025-04-22 VITALS
HEIGHT: 68 IN | SYSTOLIC BLOOD PRESSURE: 120 MMHG | HEART RATE: 81 BPM | BODY MASS INDEX: 16.93 KG/M2 | DIASTOLIC BLOOD PRESSURE: 66 MMHG | WEIGHT: 111.7 LBS | OXYGEN SATURATION: 99 %

## 2025-04-22 VITALS
DIASTOLIC BLOOD PRESSURE: 74 MMHG | BODY MASS INDEX: 17.13 KG/M2 | SYSTOLIC BLOOD PRESSURE: 132 MMHG | HEART RATE: 66 BPM | HEIGHT: 68 IN | WEIGHT: 113 LBS

## 2025-04-22 DIAGNOSIS — I44.1 MOBITZ TYPE 2 SECOND DEGREE ATRIOVENTRICULAR BLOCK: ICD-10-CM

## 2025-04-22 DIAGNOSIS — I25.10 CORONARY ARTERY DISEASE INVOLVING NATIVE CORONARY ARTERY OF NATIVE HEART WITHOUT ANGINA PECTORIS: ICD-10-CM

## 2025-04-22 DIAGNOSIS — I25.5 ISCHEMIC CARDIOMYOPATHY: ICD-10-CM

## 2025-04-22 DIAGNOSIS — I50.42 CHRONIC COMBINED SYSTOLIC AND DIASTOLIC CHF (CONGESTIVE HEART FAILURE) (CMS/HCC): Primary | ICD-10-CM

## 2025-04-22 DIAGNOSIS — Z95.0 CARDIAC PACEMAKER: ICD-10-CM

## 2025-04-22 DIAGNOSIS — E78.5 DYSLIPIDEMIA: ICD-10-CM

## 2025-04-22 DIAGNOSIS — I63.9 CEREBROVASCULAR ACCIDENT (CVA), UNSPECIFIED MECHANISM (CMS/HCC): ICD-10-CM

## 2025-04-22 DIAGNOSIS — Z86.73 HISTORY OF CARDIOEMBOLIC STROKE: ICD-10-CM

## 2025-04-22 DIAGNOSIS — R00.2 PALPITATIONS: Primary | ICD-10-CM

## 2025-04-22 DIAGNOSIS — E78.00 HYPERCHOLESTEROLEMIA: ICD-10-CM

## 2025-04-22 DIAGNOSIS — R00.2 PALPITATIONS: ICD-10-CM

## 2025-04-22 LAB
ATRIAL RATE: 66
P AXIS: 68
PR INTERVAL: 188
QRS DURATION: 174
QT INTERVAL: 494
QTC CALCULATION(BAZETT): 517
R AXIS: -50
T WAVE AXIS: 87
VENTRICULAR RATE: 66

## 2025-04-22 PROCEDURE — 99214 OFFICE O/P EST MOD 30 MIN: CPT | Performed by: INTERNAL MEDICINE

## 2025-04-22 PROCEDURE — G2211 COMPLEX E/M VISIT ADD ON: HCPCS | Performed by: INTERNAL MEDICINE

## 2025-04-22 PROCEDURE — G8754 DIAS BP LESS 90: HCPCS | Performed by: INTERNAL MEDICINE

## 2025-04-22 PROCEDURE — 93000 ELECTROCARDIOGRAM COMPLETE: CPT | Performed by: INTERNAL MEDICINE

## 2025-04-22 PROCEDURE — G8752 SYS BP LESS 140: HCPCS | Performed by: INTERNAL MEDICINE

## 2025-04-22 NOTE — ASSESSMENT & PLAN NOTE
She presented to [place] in June 2023 with aphasia and right-sided weakness.  CT revealed acute infarct in left MCA.  Echo showed EF of 40-45% localized apical aneurysm containing thrombus.  She was started on Eliquis 5 mg twice daily.  Pacemaker showed no evidence of atrial fibrillation.  There were no events on interrogation.  She remains frustrated with her right-sided weakness and aphasia

## 2025-04-22 NOTE — PROGRESS NOTES
Electrophysiology       Reason for visit: Follow-up   HPI   Jennifer Sams is a 80 y.o. female who comes to the office today for follow-up of her device and related arrhythmia issues.     She is status post dual-chamber pacemaker secondary to 2-1 AV block.  Her past medical history is significant for CAD requiring 8 stents including stents to her LAD, RCA, and circumflex and mild ischemic cardiomyopathy. She had an echocardiogram that was unchanged from June 2023 echo with probable small apical thrombus.    She presented to [facility] on June 26,2023 after she was found confused and combative.  In the emergency department she was aphasic with right-sided weakness and hypertensive to the 190 systolic.  CT scan of the head revealed an acute infarct in left MCA with mild bleeding.  Echo showed an EF of 43% and localized apical aneurysm containing thrombus. A bubble study was negative. Etiology of her stroke was thought to be due to the localized apical aneurysm containing thrombus and she was started on anticoagulation with heparin and was later transitioned to Eliquis. Her telemetry during the admission shows atrial sensing with ventricular pacing. No atrial fibrillation was seen on monitor or her pacemaker.    She has been having dyspnea on exertion. She states that when she is walking typically for prolonged distances like at a grocery store she suddenly feels that she is having a hard time catching her breath and feels her heart pounding. When she sits and rest, this quickly subsides. This has been ongoing now for the past 1-2 months. Overall, it is improving on its own. Also follows Dr. Salter. Had a Holter monitor placed and she had no abnormal arrhythmias. Stress test was also negative for ischemia.         Past Medical History:   Diagnosis Date    AAA (abdominal aortic aneurysm) (CMS/[facility])     Arthritis     Elevated blood pressure reading without diagnosis of hypertension 04/19/2019    Epistaxis  01/06/2020    GERD (gastroesophageal reflux disease) 04/19/2019    Glaucoma 04/19/2019    Heart murmur     Hiatal hernia     History of hip replacement, total, right 04/19/2019    Right hip 9/ 2016 and revision 2017     History of rheumatic fever as a child 04/19/2019    Hypercholesterolemia 04/19/2019    Ischemic cardiomyopathy 05/09/2019    Kidney cysts     MI (myocardial infarction) (CMS/formerly [facility])     Osteoarthritis of multiple joints 04/19/2019    Osteoporosis     Pacemaker 12/09/2019    Raynaud's disease 04/19/2019    RSD (reflex sympathetic dystrophy) 04/19/2019    Of left foot    SOB (shortness of breath) 12/10/2019    Status post insertion of drug eluting coronary artery stent 05/09/2019    Stroke (CMS/formerly [facility])      Past Surgical History   Procedure Laterality Date    Cholecystectomy open      Coronary angioplasty with stent placement  04/29/2019    of LAD    Coronary angioplasty with stent placement  04/30/2019    of RCA    Dilation and curettage of uterus      Eye surgery      RIGHT CATARACT    FFR - initial vessel N/A 2/12/2021    Performed by Noé Moncada [place] at [facility] [place] [facility]    FFR/iFR - initial vessel N/A 11/15/2019    Performed by Noé Moncada MD at [facility] [place] [facility]    Foot surgery Left     Joint replacement Right 09/2016    total hip    Left heart cath with coronary angiography N/A 11/15/2019    Performed by Noé Moncada MD at [facility] [place] [facility]    Left heart cath with coronary angiography N/A 4/29/2019    Performed by Noé Moncada MD at [facility] [place] [facility]/EP    PPM - dual chamber new N/A 11/18/2019    Performed by Jermaine Carolina MD at [facility] [place] [facility]/EP    Revision total hip arthroplasty Right 2017    RIGHT & LEFT HEART CATH WITH CORONARY ANGIOGRAPHY N/A 2/12/2021    Performed by Noé Moncada MD at [facility] [place] [facility]    Stent FRANK coronary - initial vessel N/A 5/2/2019    Performed by Noé Moncada MD at [facility] [place] [facility]/EP    Stent FRANK coronary - initial vessel N/A 4/30/2019    Performed by  Noé Moncada MD at [facility] [place] [facility] CATH/EP    Stent FRANK coronary - initial vessel N/A 4/29/2019    Performed by Noé Moncada MD at [facility] [place] [facility] CATH/EP    Tonsillectomy       Allergies:  Prednisone, Ace inhibitors, Atorvastatin, Brilinta [ticagrelor], Codeine, Dilaudid [hydromorphone], Morphine sulfate, Onion, and Oxycodone    Current Outpatient Medications on File Prior to Visit   Medication Sig Dispense Refill    apixaban (ELIQUIS) 5 mg tablet Take 1 tablet (5 mg total) by mouth 2 (two) times a day Indications: treatment to prevent blood clots in chronic atrial fibrillation. 180 tablet 3    pantoprazole (PROTONIX) 40 mg EC tablet Take 1 tablet (40 mg total) by mouth daily. 90 tablet 3    rosuvastatin (CRESTOR) 20 mg tablet TAKE 1 TABLET BY MOUTH EVERY DAY 90 tablet 3    furosemide (LASIX) 20 mg tablet Take 1 tablet (20 mg total) by mouth every other day. (Patient not taking: Reported on 4/22/2025) 45 tablet 1    ondansetron ODT (ZOFRAN-ODT) 4 mg disintegrating tablet Take 1 tablet (4 mg total) by mouth every 6 (six) hours as needed for nausea or vomiting for up to 3 days. (Patient not taking: Reported on 4/22/2025) 12 tablet 0     No current facility-administered medications on file prior to visit.     Social History     Socioeconomic History    Marital status: Single   Tobacco Use    Smoking status: Never    Smokeless tobacco: Never   Vaping Use    Vaping status: Never Used   Substance and Sexual Activity    Alcohol use: Not Currently    Drug use: No    Sexual activity: Defer     Social Drivers of Health     Financial Resource Strain: Low Risk  (7/3/2023)    Overall Financial Resource Strain (CARDIA)     Difficulty of Paying Living Expenses: Not hard at all   Food Insecurity: No Food Insecurity (3/26/2025)    Hunger Vital Sign     Worried About Running Out of Food in the Last Year: Never true     Ran Out of Food in the Last Year: Never true   Transportation Needs: No Transportation Needs (7/3/2023)    PRAPARE -  Transportation     Lack of Transportation (Medical): No     Lack of Transportation (Non-Medical): No   Housing Stability: Low Risk  (7/3/2023)    Housing Stability Vital Sign     Unable to Pay for Housing in the Last Year: No     Number of Places Lived in the Last Year: 1     Unstable Housing in the Last Year: No     Family History   Problem Relation Name Age of Onset    Congenital heart disease Biological Mother          noted at autopsy    Pulmonary fibrosis Biological Father      Heart attack Paternal Grandfather       ROS: See HPI     Vitals:    04/22/25 1410   BP: 132/74   Pulse: 66         Wt Readings from Last 3 Encounters:   04/22/25 51.3 kg (113 lb)   04/14/25 52.2 kg (115 lb)   03/26/25 53.1 kg (117 lb)     Physical Exam  Constitutional:       Appearance: Normal appearance. She is normal weight.   HENT:      Head: Normocephalic and atraumatic.   Cardiovascular:      Rate and Rhythm: Normal rate and regular rhythm.      Pulses: Normal pulses.      Heart sounds: Normal heart sounds.   Pulmonary:      Effort: Pulmonary effort is normal.      Breath sounds: Normal breath sounds.   Skin:     General: Skin is warm and dry.   Neurological:      Mental Status: She is alert and oriented to person, place, and time.      Motor: Weakness present.      Comments: She has right-sided extremity weakness, and dysarthria/aphasia.        Lab Results   Component Value Date    WBC 13.73 (H) 03/26/2025    HGB 16.1 (H) 03/26/2025    HCT 49.2 (H) 03/26/2025     03/26/2025    CHOL 178 06/26/2023    TRIG 93 06/26/2023    HDL 71 06/26/2023    LDLCALC 88 06/26/2023    ALT 13 03/26/2025    AST 24 03/26/2025     03/26/2025    K 3.7 03/26/2025    CREATININE 0.9 03/26/2025    BUN 19 03/26/2025    TSH 2.36 02/04/2025    INR 1.1 06/28/2023 4/14/2025 CV NM Stress   1.  This is a very technically difficult study. .   2. Regadenoson technetium tetrofosmin shows a small to moderate, fixed defect in the apical inferior,  inferoseptal wall most likely representative of sub diaphragmatic attenuations however cannot rule out scar. There is no myocardium at risk.   3. ECG shows ventricular paced rhythm, there are no ST changes with infusion of Regadenoson.  4. Gated SPECT obtained in the resting state post stress shows left ventricular ejection fraction of 62%.  All walls show normal thickening and endocardial excursion. There may be slight hypokinesis in inferior/inferoseptal walls at rest and stress, however, evaluation is limited in the setting of study quality.  5. No chest pain reported with regadenoson infusion. She received aminophylline due to nausea.  6. This is a an equivocal study.   7. Compared to nuclear medicine regadenoson stress test from 2022, this is an equivocal study in the setting of study quality.     3/19/2025 Transthoracic Echocardiogram    Left Ventricle: Normal ventricle size. Normal wall thickness. Probable small apical thrombus. Mildly decreased systolic function. Estimated EF 40-45%. Basal and mid inferolateral akinesis, mid and apical anteroseptal/septal akinesis. Apical akinesis.  Abnormal septal motion. Normal diastolic filling pattern for age.    Aortic Valve: Valve not well seen but likely trileaflet.  Sclerotic leaflets. No regurgitation. Mild stenosis. Peak velocity = 1.81 m/s. Mean gradient = 7.00 mmHg. Calculated area by cont eq = 1.47 cm2. Calculated dimensionless index = 0.47.    Aorta: Aortic root normal.    Right Ventricle: Normal ventricle size. Pacer wire present. Normal systolic function.    Tricuspid Valve: Mild regurgitation. Estimated RVSP = 21 mmHg.    Pericardium: Evidence of a prominent fat pad. Small anterior pericardial effusion    Left Atrium: Normal sized atrium.    Right Atrium: Normal sized atrium.    Mitral Valve: Mild regurgitation.    Pulmonic Valve: Valve not well visualized. No regurgitation.    IVC/SVC: Mildly dilated IVC collapsing about 50%, suggesting mild elevation of  central venous filling pressures.    Compared with March 2024, the IVC now suggest mild volume overload, otherwise no significant change.    I personally reviewed results with her and her friend in the office today.    Echocardiogram 3/15/2024    Left Ventricle: Normal ventricle size. Normal wall thickness. Probable small apical thrombus. Mildly decreased systolic function. Estimated EF 45%. Basal and mid inferolateral akinesis, mid and apical anteroseptal/septal akinesis.  Abnormal septal motion. Normal diastolic filling pattern for age.    Aortic Valve: Valve not well seen but likely trileaflet.  Sclerotic leaflets. No regurgitation. Mild stenosis. Peak velocity = 1.56 m/s. Mean gradient = 6.00 mmHg. Calculated area by cont eq = 1.23 cm2. Calculated dimensionless index = 0.32.    Aorta: Aortic root normal.    Right Ventricle: Ventricle not well visualized but probably normal size and at most mildly dilated. Pacer wire present. Normal systolic function.    Tricuspid Valve: Mild regurgitation. Estimated RVSP = 24 mmHg.    Pericardium: Evidence of a prominent fat pad. Small anterior pericardial effusion    Left Atrium: Normal sized atrium.    Right Atrium: Normal sized atrium.    Mitral Valve: Mild regurgitation.    Pulmonic Valve: Valve not well visualized. No regurgitation.    IVC/SVC: Normal sized inferior vena cava. Inferior vena cava collapses >50% during inspiration.    Compared with June 2023, no significant change.    ECG   Sinus w V pacing      Ischemic cardiomyopathy  Her most recent echo in March 2024 showed a probable small apical thrombus, mildly decreased systolic function, estimated EF 45%, basal and mid inferolateral akinesis, mid and apical anteroseptal/septal akinesis.  She follows with Dr. Salter.    Cardiac pacemaker  Medtronic Rach XT DR SUMNER [id number] implanted on 11/18/2019.  Battery longevity estimated at 7.2 years until RRT.  Mode is DDD .  The atrial lead measures a P wave of 1.9 mV, lead  impedance of 589 ohms, and a pacing threshold 0.50 V 0.4 ms.  The RV lead measures and R wave 9.6 mV, lead impedance of 418 ohms, and a pacing threshold of 0.875 V 0.4 ms.  No events on interrogation.    Her device is MRI compatible.  If necessary, she can undergo an MRI.  There were no changes made to the permanent programming of her device.     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    History of cardioembolic stroke  She presented to [place] in June 2023 with aphasia and right-sided weakness.  CT revealed acute infarct in left MCA.  Echo showed EF of 43% localized apical aneurysm containing thrombus.  She was started on Eliquis 5 mg twice daily.  Pacemaker showed no evidence of atrial fibrillation.  There were no events on interrogation.  She remains frustrated with her right-sided weakness and aphasia.       Bhavesh Morgan MD  04/22/2025

## 2025-04-22 NOTE — ASSESSMENT & PLAN NOTE
Medtronic [place] XT DR MRI [id number] implanted on 11/18/2019.  Battery longevity estimated at 7.2 years until RRT.  Mode is DDD .  The atrial lead measures a P wave of 1.9 mV, lead impedance of 589 ohms, and a pacing threshold 0.50 V 0.4 ms.  The RV lead measures and R wave 9.6 mV, lead impedance of 418 ohms, and a pacing threshold of 0.875 V 0.4 ms.  No events on interrogation.    Her device is MRI compatible.  If necessary, she can undergo an MRI.  There were no changes made to the permanent programming of her device.

## 2025-04-22 NOTE — LETTER
April 22, 2025     Sabrina Caruso MD  [address] [facility] [address]    Patient: Jennifer Sams  YOB: 1945  Date of Visit: 4/22/2025      Dear Dr. Caruso:    Thank you for referring Jennifer Sams to me for evaluation. Below are my notes for this consultation.    If you have questions, please do not hesitate to call me. I look forward to following your patient along with you.         Sincerely,        Jermaine Salter MD        CC: MD Eliza Allen Kathleen J, CRNP  4/22/2025  9:46 AM  Sign when Signing Visit     Cardiology Note          HPI   Jennifer Sams is a 80 y.o. woman who presents for follow up for dilated cardiomyopathy, today, 4/22/2025.    As you know, she has a history of a mild ischemic cardiomyopathy with a very recent reassuring left and right heart cath, heart block with pacemaker placed 2019 and CAD who has had significant dyspnea on exertion. She was evaluated at the [facility], then here by Dr. Tidwell from a pulmonary standpoint but a pulmonary cause was not found and PFTs are normal. Dr. Carolina previously made pacemaker setting adjustments with regards to AV delay which helped somewhat initially but not completely. She previously followed with Dr. Courtney who retired, and now Dr. Moncada who did a left and right heart cath in February of 2021 found low-normal filling pressures and no residual severe CAD.  Unfortunately, she suffered a large stroke in June 2023.  An echocardiogram at that time showed an LV apical thrombus and she has been on systemic anticoagulation.  She has made good progress but left with some aphasia and right-sided weakness.     At her last visit in March she was seen semiurgently by nurse practitioner Byron due to worsening dyspnea on exertion and confusion.  She had had a prolonged viral illness prior.  Also some palpitations.  We therefore ordered a stress test and ZIO monitor which were reassuring.  We also recommended  a trial of furosemide for a few days.  She and her caretaker tell me that she tried daily Lasix for 1 week.  Symptoms did not change and her weight stayed the same at 114 pounds.  She then stopped Lasix and similarly her weight stayed the same and no worsening of symptoms.  In fact she is somewhat improved in all areas.  She is becoming more active again and her dyspnea on exertion is lessening, therefore wonder if this was deconditioning as well as the effects of a prolonged viral illness.  Her confusion has mostly improved as well.  Anticipate more activity with the improving weather.      Past Medical History:   Diagnosis Date    AAA (abdominal aortic aneurysm) (CMS/[facility])     Arthritis     Elevated blood pressure reading without diagnosis of hypertension 04/19/2019    Epistaxis 01/06/2020    GERD (gastroesophageal reflux disease) 04/19/2019    Glaucoma 04/19/2019    Heart murmur     Hiatal hernia     History of hip replacement, total, right 04/19/2019    Right hip 9/ 2016 and revision 2017     History of rheumatic fever as a child 04/19/2019    Hypercholesterolemia 04/19/2019    Ischemic cardiomyopathy 05/09/2019    Kidney cysts     MI (myocardial infarction) (CMS/[facility])     Osteoarthritis of multiple joints 04/19/2019    Osteoporosis     Pacemaker 12/09/2019    Raynaud's disease 04/19/2019    RSD (reflex sympathetic dystrophy) 04/19/2019    Of left foot    SOB (shortness of breath) 12/10/2019    Status post insertion of drug eluting coronary artery stent 05/09/2019    Stroke (CMS/[facility])      Past Surgical History   Procedure Laterality Date    Cholecystectomy open      Coronary angioplasty with stent placement  04/29/2019    of LAD    Coronary angioplasty with stent placement  04/30/2019    of RCA    Dilation and curettage of uterus      Eye surgery      RIGHT CATARACT    FFR - initial vessel N/A 2/12/2021    Performed by Noé Moncada MD at [facility] CARDIAC CATH/EP    FFR/iFR - initial vessel  N/A 11/15/2019    Performed by Noé Moncada MD at [facility] [place] CARDIAC CATH/EP    Foot surgery Left     Joint replacement Right 2016    total hip    Left heart cath with coronary angiography N/A 11/15/2019    Performed by Noé Moncada MD at [facility] [place] CARDIAC CATH/EP    Left heart cath with coronary angiography N/A 2019    Performed by Noé Moncada MD at [facility] [place] CARDIAC CATH/EP    PPM - dual chamber new N/A 2019    Performed by Jermaine Carolina MD at [facility] [place] CARDIAC CATH/EP    Revision total hip arthroplasty Right 2017    RIGHT & LEFT HEART CATH WITH CORONARY ANGIOGRAPHY N/A 2021    Performed by Noé Moncada MD at [facility] [place] CARDIAC CATH/EP    Stent FRANK coronary - initial vessel N/A 2019    Performed by Noé Moncada MD at [facility] [place] CARDIAC CATH/EP    Stent FRANK coronary - initial vessel N/A 2019    Performed by Noé Moncada MD at [facility] [place] CARDIAC CATH/EP    Stent FRANK coronary - initial vessel N/A 2019    Performed by Noé Moncada MD at [facility] [place] CARDIAC CATH/EP    Tonsillectomy         SOCIAL HISTORY  Social History     Tobacco Use    Smoking status: Never    Smokeless tobacco: Never   Vaping Use    Vaping status: Never Used   Substance Use Topics    Alcohol use: Not Currently    Drug use: No     Family Status   Relation Name Status    Bio Mother   at age 66    Bio Father   at age 77        pulmonary fibrosis    MGM      MGF      PGM   at age 90        natural causes    PGF     No partnership data on file        ALLERGIES  Prednisone, Ace inhibitors, Atorvastatin, Brilinta [ticagrelor], Codeine, Dilaudid [hydromorphone], Morphine sulfate, Onion, and Oxycodone      Outpatient Encounter Medications as of 2025:     ondansetron ODT (ZOFRAN-ODT) 4 mg disintegrating tablet, Take 1 tablet (4 mg total) by mouth every 6 (six) hours as needed for nausea or vomiting for up to 3 days. (Patient not taking: Reported on 2025)    furosemide (LASIX) 20 mg  "tablet, Take 1 tablet (20 mg total) by mouth every other day. (Patient not taking: Reported on 2025)    pantoprazole (PROTONIX) 40 mg EC tablet, Take 1 tablet (40 mg total) by mouth daily.    apixaban (ELIQUIS) 5 mg tablet, Take 1 tablet (5 mg total) by mouth 2 (two) times a day Indications: treatment to prevent blood clots in chronic atrial fibrillation.    rosuvastatin (CRESTOR) 20 mg tablet, TAKE 1 TABLET BY MOUTH EVERY DAY    [] regadenoson (LEXISCAN) injection 0.4 mg,     [] ondansetron (ZOFRAN) injection 4 mg,     [] famotidine (PEPCID) injection 20 mg,     [] sodium chloride 0.9 % bolus 250 mL,     [] iopamidoL (ISOVUE-370) 370 mg iodine /mL (76 %) injection 100 mL,     [] ondansetron (ZOFRAN) injection 4 mg,     ROS   As per HPI and otherwise negative.  Otherwise all relevant systems are reviewed and are negative.    Objective   Visit Vitals  /66   Pulse 81   Ht 1.727 m (5' 8\")   Wt 50.7 kg (111 lb 11.2 oz)   SpO2 99%   BMI 16.98 kg/m²           Wt Readings from Last 3 Encounters:   25 50.7 kg (111 lb 11.2 oz)   25 51.3 kg (113 lb)   25 52.2 kg (115 lb)       Physical Exam  Vitals reviewed.   Constitutional:       Appearance: She is well-developed.   HENT:      Head: Normocephalic.   Eyes:      General: No scleral icterus.  Neck:      Vascular: No JVD.   Cardiovascular:      Rate and Rhythm: Normal rate and regular rhythm.      Heart sounds: Normal heart sounds.   Pulmonary:      Breath sounds: Normal breath sounds.   Skin:     General: Skin is warm and dry.   Neurological:      Mental Status: She is alert and oriented to person, place, and time.      Comments: Right sided weakness. Moderate expressive aphasia   Psychiatric:         Judgment: Judgment normal.         Lab Results   Component Value Date    GLUCOSE 157 (H) 2025    CALCIUM 9.7 2025     2025    K 3.7 2025    CO2 2025     " "03/26/2025    BUN 19 03/26/2025    CREATININE 0.9 03/26/2025     Lab Results   Component Value Date    ALT 13 03/26/2025    AST 24 03/26/2025    ALKPHOS 64 03/26/2025    BILITOT 0.7 03/26/2025     Lab Results   Component Value Date    WBC 13.73 (H) 03/26/2025    HGB 16.1 (H) 03/26/2025    HCT 49.2 (H) 03/26/2025    MCV 95.7 03/26/2025     03/26/2025     Lab Results   Component Value Date    TSH 2.36 02/04/2025     Lab Results   Component Value Date    CHOL 178 06/26/2023    CHOL 163 03/31/2023    CHOL 156 02/04/2022     Lab Results   Component Value Date    HDL 71 06/26/2023    HDL 80 03/31/2023    HDL 71 02/04/2022     Lab Results   Component Value Date    LDLCALC 88 06/26/2023    LDLCALC 66 03/31/2023    LDLCALC 66 02/04/2022     Lab Results   Component Value Date    TRIG 93 06/26/2023    TRIG 87 03/31/2023    TRIG 107 02/04/2022     No results found for: \"CHOLHDL\"  Lab Results   Component Value Date    HGBA1C 5.4 06/26/2023     Labs December 27, 2023:              Labs 2/6/24:       EKG    Performed on 04/22/24 and personally reviewed:  Sinus with v-pacing at 66bpm      Imaging    Echo 11/29/19:  Technically limited study no gross chamber enlargement  Imaging done just to exclude pericardial effusion Limited echo study  No regional wall motion abnormality preserved ejection fraction 55 to 60%  Aortic sclerosis  Mitral tricuspid valves appear normal  Prominent anterior fat pad no evidence of pericardial effusion or tamponade    CXR 1/17/2020:  No active disease.    PFTs 12/24/20:        LHC/RHC 2/12/21:  1. Normal right and left heart filling pressures (RA 3, PA 20/5 with mean of 11 mmHg, and PCW 6 mm Hg).  2. Patent prior stents in the mid and distal LAD.  3. 50% ostial Cx stenosis, not functionally significant by iFR (0.95). Patent proximal Cx stent.  4. Patent mid and distal RCA stents.    Echo 2/17/21:  Left Ventricle: Normal ventricle size. Normal wall thickness. Mildly decreased systolic function. " Estimated EF 45- 50%. No regional wall motion abnormalities. Grade I diastolic dysfunction. Diastolic inflow pattern consistent with impaired relaxation. Normal left atrial pressure.  Aortic Valve: Tricuspid valve. Sclerotic leaflets.  Sinuses of Valsalva normal-sized. Ascending aorta normal-sized.  Mild mitral annular calcification.  Left Atrium: Normal-sized atrium. Normal doppler flow of pulmonary veins.  Right Ventricle: Normal ventricle size. Low normal systolic function. Pacer wire present. Normal wall thickness.  Right Atrium: Normal-sized atrium. Pacer/ICD lead present.  Tricuspid Valve: Normal structure. Mild regurgitation. Estimated RVSP = 25 mmHg. The regurgitation jet is central. Normal hepatic vein systolic flow.  IVC/SVC: Normal-sized IVC. IVC collapses >50% during inspiration. Normal hepatic vein flow.  Pericardium: Normal structure.     CPT July 2021  CARDIOPULMONARY GAS EXCHANGE:  The test was near-maximal as defined by a respiratory exchange ratio of 1.10(normal >1.10).  The peak VO2 was 14.7 cc/kg/min which is 62% predicted (normal >85%).  The anaerobic threshold was 39% predicted (normal >40%).  The peak minute ventilation was 43L/min yielding a normal breathing reserve of 52% (normal >30%).  The VE/VCO2 slope was 43(normal <34).  Resting spirometry showed an FVC of 98%, FEV1 of 99% consistent with normal spirometry.     CONCLUSIONS:  By respiratory exchange ratio, the test was near maximal thus peak VO2 achieved may slightly underestimate her true exercise capacity.  Peak VO2 achieved was 14.7 cc/kg/min which is only 62% of predicted.  In combination with a low anaerobic threshold there is likely an element of deconditioning.  Her breathing reserve and spirometry suggest a cardiac rather than pulmonary limitation to exercise.  Suggest a regular exercise regimen to potentially increase functional capacity.     TTE 2-9-22  Left Ventricle: Normal ventricle size. Mild concentric left ventricular  hypertrophy. Mild-to-moderately decreased systolic function. Estimated EF 40- 45%. LV SVI low at 28 mL/m² per beat. Basal and mid inferolateral akinesis, mid and apical anteroseptal akinesisGrade I diastolic dysfunction. Diastolic inflow pattern consistent with impaired relaxation. Normal left atrial pressure.  Tricuspid aortic valve. Sclerotic aortic valve leaflets.  Aorta: Sinuses of Valsalva normal-sized.  Normal leaflet structure of the mitral valve.  Normal-sized LA. Doppler flow of pulmonary veins not obtained.  Normal-sized RV. Mildly reduced RV systolic function. Pacemaker/ICD lead present in the RV. Normal RV wall thickness. Mid free wall akinetic.  Normal-sized RA. Pacemaker wire present in the RA.  Tricuspid Valve: Normal structure. Trace regurgitation. Estimated RVSP = 27 mmHg.  Pulmonic valve not well visualized.  IVC collapses <50% during inspiration.  Evidence of a prominent pericardial fat pad.    X2IMPACT 3-11-22  1. This is a very technically difficult study.  Patient showed excessive motion during image acquisition, imaged with arms at sides, and diaphragmatic and GI interference.   2. Regadenoson technetium tetrofosmin shows a small, fixed defect in the apical to mid inferior wall, suggestive of tissue attenuation, although scar cannot be excluded. There is no myocardium at risk.   3. EKG is non-diagnostic due to ventricular pacing.  4. Gated SPECT imaging was unable to be obtained.   5. No prior study for comparison. Prior nuclear imaging was non-diagnostic, exercise testing.     Echo 9/13/2022:    Left Ventricle: Normal ventricle size. Normal wall thickness. Mildly decreased systolic function. Estimated EF 45-50%. Basal and mid inferolateral akinesis, mid and apical anteroseptal/septal akinesis.    Aortic Valve: Valve not well seen but likely trileaflet.  Sclerotic leaflets. No regurgitation. No stenosis.    Aorta: Aortic root normal.    Right Ventricle: Ventricle not well visualized but  probably normal size and at most mildly dilated. Catheter present. Normal systolic function.    Tricuspid Valve: Mild regurgitation. Estimated RVSP = 26 mmHg.    Pericardium: Small anterior pericardial effusion    Left Atrium: Normal sized atrium.    Right Atrium: Normal sized atrium.    Mitral Valve: No regurgitation.    Pulmonic Valve: Valve not well visualized. No regurgitation.    IVC/SVC: Normal sized inferior vena cava. Inferior vena cava collapses >50% during inspiration.    Compared with February 2022, no significant change.    I personally reviewed results with her in the office today.     Abdominal US 10/18/2022:  Aortic ectasia noted, measuring about 2.8 cm in maximal diameter    Echo 3/28/2023:    Left Ventricle: Normal ventricle size. Normal wall thickness. Mildly decreased systolic function. Estimated EF 45-50%. Basal and mid inferolateral akinesis, mid and apical anteroseptal/septal akinesis.    Aortic Valve: Valve not well seen but likely trileaflet.  Sclerotic leaflets. No regurgitation. No stenosis.    Aorta: Aortic root normal.    Right Ventricle: Ventricle not well visualized but probably normal size and at most mildly dilated. Catheter present. Normal systolic function.    Tricuspid Valve: Mild regurgitation. Estimated RVSP = 26 mmHg.    Pericardium: Small anterior pericardial effusion    Left Atrium: Normal sized atrium.    Right Atrium: Normal sized atrium.    Mitral Valve: No regurgitation.    Pulmonic Valve: Valve not well visualized. No regurgitation.    IVC/SVC: Normal sized inferior vena cava. Inferior vena cava collapses >50% during inspiration.    Compared with February 2022, no significant change.    I personally reviewed results with her in the office today.    Echo 6/27/2023  Left ventricle with normal dimensions and regional contraction abnormalities consistent with CAD in the LAD distribution: Localized apical infarction with aneurysm formation.  Paradoxical  septal movement is consistent with ventricular pacing.  There is moderate left ventricular systolic and mild diastolic dysfunction.  LVEF 43%  Left ventricular apical thrombus  Left atrial pressure 14 mmHg  There is a visual suggestion of mild aortic stenosis. (Doppler assessment of the aortic valve is suboptimal)  Mild mitral regurgitation  Normal right ventricle dimensions and systolic function: TAPSE 1.84 cm  Mild tricuspid regurgitation  PASP 34 mmHg  Normal left atrial volume index  No pericardial effusion or vegetations  AS-V pacing  Technically difficult study: No parasternal imaging obtained  This study was performed with Definity contrast to optimize evaluation of regional and global left ventricular function  No significant change from 3/28/2023 except for current demonstration of left ventricular apical thrombus    TTE 3/2024    Left Ventricle: Normal ventricle size. Normal wall thickness. Probable small apical thrombus. Mildly decreased systolic function. Estimated EF 45%. Basal and mid inferolateral akinesis, mid and apical anteroseptal/septal akinesis.  Abnormal septal motion. Normal diastolic filling pattern for age.    Aortic Valve: Valve not well seen but likely trileaflet.  Sclerotic leaflets. No regurgitation. Mild stenosis. Peak velocity = 1.56 m/s. Mean gradient = 6.00 mmHg. Calculated area by cont eq = 1.23 cm2. Calculated dimensionless index = 0.32.    Aorta: Aortic root normal.    Right Ventricle: Ventricle not well visualized but probably normal size and at most mildly dilated. Pacer wire present. Normal systolic function.    Tricuspid Valve: Mild regurgitation. Estimated RVSP = 24 mmHg.    Pericardium: Evidence of a prominent fat pad. Small anterior pericardial effusion    Left Atrium: Normal sized atrium.    Right Atrium: Normal sized atrium.    Mitral Valve: Mild regurgitation.    Pulmonic Valve: Valve not well visualized. No regurgitation.    IVC/SVC: Normal sized inferior  "vena cava. Inferior vena cava collapses >50% during inspiration.    Compared with June 2023, no significant change.      Zio 4/2025      NST 4/2025  1.  This is a very technically difficult study. .   2. Regadenoson technetium tetrofosmin shows a small to moderate, fixed defect in the apical inferior, inferoseptal wall most likely representative of sub diaphragmatic attenuations however cannot rule out scar. There is no myocardium at risk.   3. ECG shows ventricular paced rhythm, there are no ST changes with infusion of Regadenoson.  4. Gated SPECT obtained in the resting state post stress shows left ventricular ejection fraction of 62%.  All walls show normal thickening and endocardial excursion. There may be slight hypokinesis in inferior/inferoseptal walls at rest and stress, however, evaluation is limited in the setting of study quality.  5. No chest pain reported with regadenoson infusion. She received aminophylline due to nausea.  6. This is a an equivocal study.   7. Compared to nuclear medicine regadenoson stress test from 2022, this is an equivocal study in the setting of study quality.     ASSESSMENT AND PLAN    1. Chronic systolic and diastolic CHF, ACC Stage C, NYHA class 3  Right heart cath showed low filling pressures, so diuretics were not indicated. Given the dizziness int he past we were avoding anything with a diuretic effect thus the decision to go with valsartan or irbesartan to start.  I prescribed a few times in the past  but she has not started it. She is \"sick\" of all of her meds and her condition.  Last OV she was mildly overloaded on exam.  Echo obtained prior to visit and  EF 40 to 45%. Her IVC appears mildly dilated.  Her weight was also up about 7 pounds from baseline.  A week of furosemide did not change her weight or symptoms.  She then has been off furosemide for 3 weeks with again her weight staying the same and her symptoms actually improved likely unrelated.  She therefore likely " gained back 6 pounds of regular weight after losing a lot of weight over the last few years.  She examines euvolemic today.  She will stay off of Lasix for now.  Dry weight appears to be about 114 pounds currently.       2. CAD.  non-STEMI 4/26/19, three-vessel CAD; status post FRANK x 2 LAD 4/27/19 + FRANK x 4 RCA 4/29/19 + FRANK x 1 LCX OM2 5/2/19  She should continue aspirin but she is not. She will continue statin therapy.  Dr. Moncada attempted to keep her on dual antiplatelet therapy given her numerous stents but ultimately could not be tolerated with incessant nosebleeds.  Now on Eliquis since her stroke.  Due to recent episode in February, we ordered a Lexiscan, which was negative.     3. Dyslipidemia  She should continue with rosuvastatin therapy.   Last LDL 88.  I have picked my battles in the past of asking her to start Lexapro and Irbesartan which were previously prescribed but she didn't start.    4. Discoloration of toes, concern for peripheral vascular disease  She describes what could be Raynaud's of her feet but does not get it in her hands.  She does have reduced pulses in her left foot.  Symptoms are unchanged. Will continue to monitor.  We previously discussed a vascular assessment but she declined.    5. LV apical thrombus  Found when CVA occurred June 2023. Should stay on lifelong anticoagulation, continue Eliquis,.     6.  Abdominal aortic aneurysm.  2.9 cm on imaging in 2018.  A follow-up ultrasound 2022 showed no significant growth.  No need to continue surveillance as she is even slightly under the minimal cutoff for an aneurysm.     7.  Mobitz 2 AV block status post pacemaker 2019.  She follows with Dr. Carolina     8. CVA  Found to have LV apical thrombus, so likely embolic. Should stay on Eliquis. Following with neurology, recent worsening cognition and increased right-sided weakness,     9.  Depression and anxiety.  She has been prescribed escitalopram for this by her PCP, but Jennifer and her  friend reports she did not want to start it.  We had a long discussion last visit about the benefits of it.  She is worried it will be sedating and I reassured her it is not that type of anxiety medicine.  Long discussion about benefits and we discussed that approximately 50% of stroke victims end up suffering from depression.  I have strongly recommended she start it as recommended by her PCP.  Ultimately she decided not to.    10.  Palpitation  Benign Zio 4/2025     11.  Shortness of breath/chest pain  Lasix made no difference.  Ultimately it seems her breathing gradually improved with getting more active again.  It is therefore felt her worsening dyspnea was deconditioning as well as getting over a prolonged viral illness.     12.  Abnormal CT scan  Advised patient that if the patient needs to follow-up with PCP regarding abnormal CAT scan report in February 2025.  She should have repeat imaging to follow tubular density     13.  Elevated blood pressure  Elevated last office visit.  Patient-report and her friend report whitecoat hypertension.  Good in the office today.        I attest that this visit supports the complexity inherent to evaluation and management associated with medical care services that serve as the continuing focal point for all needed health care services and/or medical care services that are part of ongoing care related to this patient's single, serious condition or a complex condition.    Thank you for allow me to participate in the care of this patient.  I reviewed interim records from EP as well as nuclear stress test and ER visit.  I will plan to see her back in 6 to 8 months for routine follow-up but she knows to contact me if any questions or concerns in the interim.    Sincerely,    Jermaine Salter MD  4/22/2025

## 2025-04-22 NOTE — ASSESSMENT & PLAN NOTE
Her most recent echo in March 2024 showed a probable small apical thrombus, mildly decreased systolic function, estimated EF 45%, basal and mid inferolateral akinesis, mid and apical anteroseptal/septal akinesis.  She follows with Dr. Salter.

## 2025-07-28 ENCOUNTER — APPOINTMENT (OUTPATIENT)
Age: 80
Setting detail: DERMATOLOGY
End: 2025-07-28

## 2025-07-28 DIAGNOSIS — L82.1 OTHER SEBORRHEIC KERATOSIS: ICD-10-CM

## 2025-07-28 DIAGNOSIS — L81.4 OTHER MELANIN HYPERPIGMENTATION: ICD-10-CM

## 2025-07-28 DIAGNOSIS — B07.8 OTHER VIRAL WARTS: ICD-10-CM

## 2025-07-28 PROCEDURE — 17110 DESTRUCT B9 LESION 1-14: CPT

## 2025-07-28 PROCEDURE — 99212 OFFICE O/P EST SF 10 MIN: CPT | Mod: 25

## 2025-07-28 PROCEDURE — OTHER LIQUID NITROGEN: OTHER

## 2025-07-28 PROCEDURE — OTHER COUNSELING: OTHER

## 2025-07-28 ASSESSMENT — LOCATION SIMPLE DESCRIPTION DERM
LOCATION SIMPLE: RIGHT PRETIBIAL REGION
LOCATION SIMPLE: RIGHT THIGH
LOCATION SIMPLE: LEFT THIGH
LOCATION SIMPLE: RIGHT CHEEK
LOCATION SIMPLE: LEFT CHEEK
LOCATION SIMPLE: LEFT UPPER BACK
LOCATION SIMPLE: RIGHT UPPER BACK
LOCATION SIMPLE: LEFT PRETIBIAL REGION

## 2025-07-28 ASSESSMENT — LOCATION DETAILED DESCRIPTION DERM
LOCATION DETAILED: LEFT MEDIAL MALAR CHEEK
LOCATION DETAILED: RIGHT ANTERIOR DISTAL THIGH
LOCATION DETAILED: RIGHT INFERIOR UPPER BACK
LOCATION DETAILED: LEFT PROXIMAL PRETIBIAL REGION
LOCATION DETAILED: LEFT ANTERIOR PROXIMAL THIGH
LOCATION DETAILED: LEFT INFERIOR MEDIAL UPPER BACK
LOCATION DETAILED: LEFT MID-UPPER BACK
LOCATION DETAILED: LEFT ANTERIOR DISTAL THIGH
LOCATION DETAILED: RIGHT MEDIAL MALAR CHEEK
LOCATION DETAILED: RIGHT PROXIMAL PRETIBIAL REGION
LOCATION DETAILED: LEFT INFERIOR UPPER BACK

## 2025-07-28 ASSESSMENT — LOCATION ZONE DERM
LOCATION ZONE: TRUNK
LOCATION ZONE: LEG
LOCATION ZONE: FACE

## 2025-08-11 ENCOUNTER — TELEPHONE (OUTPATIENT)
Dept: CARDIOLOGY | Facility: CLINIC | Age: 80
End: 2025-08-11
Payer: MEDICARE

## 2025-08-27 ENCOUNTER — CLINICAL SUPPORT (OUTPATIENT)
Dept: CARDIOLOGY | Facility: CLINIC | Age: 80
End: 2025-08-27
Payer: MEDICARE

## 2025-08-27 DIAGNOSIS — R00.2 PALPITATIONS: Primary | ICD-10-CM

## 2025-08-27 DIAGNOSIS — Z95.0 CARDIAC PACEMAKER: ICD-10-CM

## (undated) DEVICE — CATH GUIDE ADROIT 6FR .072 XB3 100CM

## (undated) DEVICE — CATHETER 6FR SWAN

## (undated) DEVICE — CATHETER DIAG FR4 5FR

## (undated) DEVICE — CATH SPRINTER LEGEND RX 1.25X15MM

## (undated) DEVICE — ***USE 121412***PACK DEVICE IMPLANT TLH

## (undated) DEVICE — GUIDEWIRE RUNTHROUGH NS .014 X 300

## (undated) DEVICE — GUIDEWIRE DIAGNOSTIC 035-260 EXCHANGE

## (undated) DEVICE — SHEATH PRELUDE SNAP 7FR 13CM

## (undated) DEVICE — GUIDEWIRE REGULAR STANDARD ANGLE TIP 035 DIAMETER 180 CM LON

## (undated) DEVICE — CATH BALLOON MINI TREK RX 2.00X20MM

## (undated) DEVICE — GLIDESHEATH SLENDER SS (.021) 6FR 10CM

## (undated) DEVICE — KIT CATH LAB ANGIO

## (undated) DEVICE — CATH BALLOON TREK RX 2.50 X 12MM

## (undated) DEVICE — CATH 6FR PIG 145 ANGLE

## (undated) DEVICE — ***USE 141890***CATH BALLOON NC TREK 3.0X12MM

## (undated) DEVICE — CATH GUIDELINER 6FR

## (undated) DEVICE — CATH 6FR FL3.5

## (undated) DEVICE — GUIDEWIRE PRESSURE VERRATA PLUS J-TIP 185CM

## (undated) DEVICE — GUIDEWIRE DIAGNOSTIC J .035. 150 (ORDER IN 5'S)

## (undated) DEVICE — CATH BALLOON SC EUPHORA 2.0 X 20MM

## (undated) DEVICE — CATH D 6F FR4 100CM

## (undated) DEVICE — INFLATION DEVICE ENCORE 26

## (undated) DEVICE — CATH VISTA BRITE 7F JR 4

## (undated) DEVICE — ***USE 141919***CATH BALLOON NC TREK 2.5X15MM

## (undated) DEVICE — DEVICE INFLATION 20/30 PRIORITY PACK W/COPILOT

## (undated) DEVICE — CATH BALLOON NC QUANTUM APEX MR 3.50MM X 12MM

## (undated) DEVICE — KIT MICROACCESS PRECISION 6FR

## (undated) DEVICE — CATH BALLOON TREK RX 2.25 X 20MM

## (undated) DEVICE — CATHETER DIAG FL 3.5  5FR

## (undated) DEVICE — MICROCATHETER CARAVEL 1.9FR 135CM

## (undated) DEVICE — GUIDEWIRE RUNTHROUGH NS .014 X 180

## (undated) DEVICE — R-BAND RADIAL HEMOSTASIS 24CM

## (undated) DEVICE — GUIDEWIRE BMW UNIVERSAL 190CM "J"

## (undated) DEVICE — WIRE WIGGLE 300CM

## (undated) DEVICE — CATH VISTA BRITE 7F AR1

## (undated) DEVICE — CATH SPRINTER LEGEND RX 2.5X12MM

## (undated) DEVICE — SHEATH 7FR

## (undated) DEVICE — CATH BALLOON NC EUPHORA 2.5 X 20MM

## (undated) DEVICE — GUIDEWIRE BMW UNIVERSAL 300CM "J"